# Patient Record
Sex: FEMALE | Race: WHITE | NOT HISPANIC OR LATINO | Employment: OTHER | URBAN - METROPOLITAN AREA
[De-identification: names, ages, dates, MRNs, and addresses within clinical notes are randomized per-mention and may not be internally consistent; named-entity substitution may affect disease eponyms.]

---

## 2018-12-14 ENCOUNTER — OFFICE VISIT (OUTPATIENT)
Dept: FAMILY MEDICINE | Facility: CLINIC | Age: 63
End: 2018-12-14
Payer: MEDICARE

## 2018-12-14 VITALS
OXYGEN SATURATION: 95 % | SYSTOLIC BLOOD PRESSURE: 165 MMHG | HEIGHT: 64 IN | TEMPERATURE: 98 F | WEIGHT: 198.81 LBS | BODY MASS INDEX: 33.94 KG/M2 | RESPIRATION RATE: 20 BRPM | HEART RATE: 68 BPM | DIASTOLIC BLOOD PRESSURE: 72 MMHG

## 2018-12-14 DIAGNOSIS — M51.9 LUMBAR DISC DISORDER: Primary | ICD-10-CM

## 2018-12-14 DIAGNOSIS — I10 ESSENTIAL HYPERTENSION: ICD-10-CM

## 2018-12-14 DIAGNOSIS — E03.9 HYPOTHYROIDISM, UNSPECIFIED TYPE: ICD-10-CM

## 2018-12-14 DIAGNOSIS — R19.7 DIARRHEA, UNSPECIFIED TYPE: ICD-10-CM

## 2018-12-14 DIAGNOSIS — I50.9 CHRONIC CONGESTIVE HEART FAILURE, UNSPECIFIED HEART FAILURE TYPE: ICD-10-CM

## 2018-12-14 DIAGNOSIS — E66.9 OBESITY (BMI 30.0-34.9): ICD-10-CM

## 2018-12-14 DIAGNOSIS — K22.4 ESOPHAGEAL SPASM: ICD-10-CM

## 2018-12-14 PROCEDURE — 3078F DIAST BP <80 MM HG: CPT | Mod: CPTII,S$GLB,, | Performed by: NURSE PRACTITIONER

## 2018-12-14 PROCEDURE — 3077F SYST BP >= 140 MM HG: CPT | Mod: CPTII,S$GLB,, | Performed by: NURSE PRACTITIONER

## 2018-12-14 PROCEDURE — 99999 PR PBB SHADOW E&M-NEW PATIENT-LVL III: CPT | Mod: PBBFAC,,, | Performed by: NURSE PRACTITIONER

## 2018-12-14 PROCEDURE — 3008F BODY MASS INDEX DOCD: CPT | Mod: CPTII,S$GLB,, | Performed by: NURSE PRACTITIONER

## 2018-12-14 PROCEDURE — 99204 OFFICE O/P NEW MOD 45 MIN: CPT | Mod: S$GLB,,, | Performed by: NURSE PRACTITIONER

## 2018-12-14 RX ORDER — HYDROCODONE BITARTRATE AND ACETAMINOPHEN 7.5; 325 MG/1; MG/1
TABLET ORAL
Refills: 0 | COMMUNITY
Start: 2018-10-19 | End: 2019-06-25 | Stop reason: SDUPTHER

## 2018-12-14 RX ORDER — MONTELUKAST SODIUM 4 MG/1
1 TABLET, CHEWABLE ORAL DAILY PRN
COMMUNITY
Start: 2018-12-04 | End: 2020-03-04 | Stop reason: CLARIF

## 2018-12-14 RX ORDER — ALLOPURINOL 300 MG/1
300 TABLET ORAL DAILY
COMMUNITY
Start: 2018-11-16 | End: 2020-01-30 | Stop reason: SDUPTHER

## 2018-12-14 RX ORDER — CYANOCOBALAMIN 1000 UG/ML
INJECTION, SOLUTION INTRAMUSCULAR; SUBCUTANEOUS
Refills: 2 | COMMUNITY
Start: 2018-09-12 | End: 2019-04-23 | Stop reason: SDUPTHER

## 2018-12-14 RX ORDER — GLYCOPYRROLATE 1 MG/1
TABLET ORAL
Qty: 90 TABLET | Refills: 3 | Status: SHIPPED | OUTPATIENT
Start: 2018-12-14 | End: 2019-07-17 | Stop reason: SDUPTHER

## 2018-12-14 RX ORDER — GLYCOPYRROLATE 1 MG/1
TABLET ORAL
Refills: 0 | COMMUNITY
Start: 2018-12-04 | End: 2018-12-14 | Stop reason: SDUPTHER

## 2018-12-14 NOTE — PROGRESS NOTES
"Chief Complaint  Chief Complaint   Patient presents with    [facility]       HPI:  Alessia Nelson is a 63 y.o. female with medical diagnoses as listed and reviewed within the medical history and problem list that presents to [facility]. She has multiple chronic medical problems as listed. She reports that they are all "controlled". Pt has an appointment with neurosurgery in [place] in January and she is wanting to get a new referral to see an [facility] doctor in [place]. Pt reports that she has bulging discs and has had previous hardware placed into her lumbar spine. Pt is ambulatory. She controls her pain with tylenol, motrin, and hydrocodone as needed. She has not had labs done in about 6 months.     PAST MEDICAL HISTORY:  Past Medical History:   Diagnosis Date    CHF (congestive heart failure)     COPD (chronic obstructive pulmonary disease)     Depression     Hypertension     Thyroid disease        PAST SURGICAL HISTORY:  Past Surgical History:   Procedure Laterality Date    instestine      PARATHYROID GLAND SURGERY      3 surgeries       SOCIAL HISTORY:  Social History     Socioeconomic History    Marital status: Single     Spouse name: Not on file    Number of children: Not on file    Years of education: Not on file    Highest education level: Not on file   Social Needs    Financial resource strain: Not on file    Food insecurity - worry: Not on file    Food insecurity - inability: Not on file    Transportation needs - medical: Not on file    Transportation needs - non-medical: Not on file   Occupational History    Not on file   Tobacco Use    Smoking status: Current Every Day Smoker     Packs/day: 1.00     Years: 15.00     Pack years: 15.00   Substance and Sexual Activity    Alcohol use: No    Drug use: No    Sexual activity: Not Currently   Other Topics Concern    Not on file   Social History Narrative    Not on file       FAMILY HISTORY:  Family History   Problem " Relation Age of Onset    Stroke Maternal Grandmother     Cancer Maternal Grandfather        ALLERGIES AND MEDICATIONS: updated and reviewed.  Review of patient's allergies indicates:   Allergen Reactions    Aspirin     Hydrocodone     Phenergan [promethazine]      Current Outpatient Medications   Medication Sig Dispense Refill    acetaminophen (TYLENOL) 325 MG tablet Take 2 tablets (650 mg total) by mouth every 6 (six) hours as needed (or equal to 101 degree F).      allopurinol (ZYLOPRIM) 300 MG tablet       amitriptyline (ELAVIL) 100 MG tablet Take 100 mg by mouth every evening.      amlodipine (NORVASC) 5 MG tablet Take 5 mg by mouth once daily.      clonidine 0.3 mg/24 hr td ptwk (CATAPRES) 0.3 mg/24 hr Place 1 patch onto the skin every 7 days. 4 patch 3    colestipol (COLESTID) 1 gram Tab       cyanocobalamin 1,000 mcg/mL injection INJECT 1 ML INTO THE MUSCLE Q OTHER WEEK.  2    escitalopram (LEXAPRO) 20 MG tablet Take 20 mg by mouth once daily.      gabapentin (NEURONTIN) 800 MG tablet Take 800 mg by mouth 4 (four) times daily.      glycopyrrolate (ROBINUL) 1 mg Tab TK 1 T PO BID 90 tablet 3    HYDROcodone-acetaminophen (NORCO) 7.5-325 mg per tablet TK 1 T PO  BID PRN PAIN  0    levothyroxine (SYNTHROID) 50 MCG tablet Take 50 mcg by mouth once daily.      lisinopril (PRINIVIL,ZESTRIL) 40 MG tablet Take 0.5 tablets (20 mg total) by mouth daily with dinner or evening meal. 30 tablet 3    magnesium oxide (MAG-OX) 400 mg tablet Take 1 tablet (400 mg total) by mouth once daily. 30 tablet 1    metoprolol tartrate (LOPRESSOR) 100 MG tablet Take 100 mg by mouth 2 (two) times daily.      nifedipine 30 MG ORAL TR24 (PROCARDIA-XL) 30 MG (OSM) 24 hr tablet Take 1 tablet (30 mg total) by mouth once daily. 30 tablet 3    ondansetron (ZOFRAN) 4 MG tablet Take 8 mg by mouth every 8 (eight) hours.      pantoprazole (PROTONIX) 40 MG tablet Take 40 mg by mouth once daily.      potassium chloride SA  "(K-DUR,KLOR-CON) 20 MEQ tablet Take 1 tablet (20 mEq total) by mouth once daily. 30 tablet 3     No current facility-administered medications for this visit.          ROS  Review of Systems   Constitutional: Negative for appetite change, chills, fatigue and fever.   HENT: Negative for congestion, postnasal drip, rhinorrhea and sore throat.    Respiratory: Negative for cough, chest tightness, shortness of breath and wheezing.    Cardiovascular: Negative for chest pain and palpitations.   Gastrointestinal: Negative for abdominal distention, abdominal pain, constipation, diarrhea, nausea and vomiting.   Genitourinary: Negative for dysuria.   Musculoskeletal: Positive for arthralgias, back pain and myalgias.   Skin: Negative for color change and rash.   Neurological: Negative for dizziness, weakness and headaches.   Psychiatric/Behavioral: Negative for confusion and sleep disturbance. The patient is not nervous/anxious.            PHYSICAL EXAM  Vitals:    12/14/18 1056   BP: (!) 165/72   BP Location: Left arm   Patient Position: Sitting   Pulse: 68   Resp: 20   Temp: 98 °F (36.7 °C)   SpO2: 95%   Weight: 90.2 kg (198 lb 12.8 oz)   Height: 5' 4" (1.626 m)    Body mass index is 34.12 kg/m².  Weight: 90.2 kg (198 lb 12.8 oz)   Height: 5' 4" (162.6 cm)       Physical Exam   Constitutional: She is oriented to person, place, and time. She appears well-developed and well-nourished.   HENT:   Head: Normocephalic.   Eyes: Pupils are equal, round, and reactive to light.   Neck: Normal range of motion. Neck supple.   Cardiovascular: Normal rate, regular rhythm, S1 normal and S2 normal.   No murmur heard.  Pulmonary/Chest: Effort normal and breath sounds normal. She has no wheezes.   Abdominal: Soft. Normal appearance and bowel sounds are normal. She exhibits no distension. There is no tenderness.   Musculoskeletal: Normal range of motion.   Neurological: She is alert and oriented to person, place, and time.   Skin: Skin is warm " and dry. Capillary refill takes less than 2 seconds.   Psychiatric: She has a normal mood and affect. Her speech is normal and behavior is normal. Thought content normal. Cognition and memory are normal.   Vitals reviewed.        Health Maintenance       Date Due Completion Date    Hepatitis C Screening 1955 ---    Lipid Panel 1955 ---    TETANUS VACCINE 10/05/1974 ---    Pap Smear with HPV Cotest 10/05/1977 ---    Mammogram 10/05/1996 ---    Colonoscopy 10/05/2006 ---    Zoster Vaccine 10/05/2016 ---    Influenza Vaccine 08/01/2018 ---               Assessment & Plan    Alessia was seen today for establish care.    Diagnoses and all orders for this visit:    Lumbar disc disorder  -     Ambulatory referral to Neurosurgery    Essential hypertension  -     CBC auto differential; Future  -     Comprehensive metabolic panel; Future  -     Lipid panel; Future    Diarrhea, unspecified type  -     CBC auto differential; Future  -     Comprehensive metabolic panel; Future    Chronic congestive heart failure, unspecified heart failure type  -     CBC auto differential; Future  -     Comprehensive metabolic panel; Future  -     Lipid panel; Future    Hypothyroidism, unspecified type  -     TSH; Future    Obesity (BMI 30.0-34.9)    Esophageal spasm  -     glycopyrrolate (ROBINUL) 1 mg Tab; TK 1 T PO BID    - Will check labs and update plan of care.   - Educated on back care.   - Educated on diet and exercise to improve overall health and improve control of chronic medical conditions.     Follow-up: Follow-up in about 3 months (around 3/14/2019).      Risks, benefits, and side effects were discussed with the patient. All questions were answered to the fullest satisfaction of the patient, and pt verbalized understanding and agreement to treatment plan. Pt was to call with any new or worsening symptoms, or present to the ER.

## 2019-01-04 ENCOUNTER — OFFICE VISIT (OUTPATIENT)
Dept: FAMILY MEDICINE | Facility: CLINIC | Age: 64
End: 2019-01-04
Payer: MEDICARE

## 2019-01-04 VITALS
BODY MASS INDEX: 31.39 KG/M2 | SYSTOLIC BLOOD PRESSURE: 139 MMHG | HEIGHT: 67 IN | DIASTOLIC BLOOD PRESSURE: 76 MMHG | WEIGHT: 200 LBS | TEMPERATURE: 99 F | HEART RATE: 62 BPM

## 2019-01-04 DIAGNOSIS — J20.9 COPD WITH ACUTE BRONCHITIS: Primary | ICD-10-CM

## 2019-01-04 DIAGNOSIS — F51.01 PRIMARY INSOMNIA: ICD-10-CM

## 2019-01-04 DIAGNOSIS — J44.0 COPD WITH ACUTE BRONCHITIS: Primary | ICD-10-CM

## 2019-01-04 DIAGNOSIS — J01.40 ACUTE NON-RECURRENT PANSINUSITIS: ICD-10-CM

## 2019-01-04 PROCEDURE — 99213 PR OFFICE/OUTPT VISIT, EST, LEVL III, 20-29 MIN: ICD-10-PCS | Mod: S$GLB,,, | Performed by: NURSE PRACTITIONER

## 2019-01-04 PROCEDURE — 3075F PR MOST RECENT SYSTOLIC BLOOD PRESS GE 130-139MM HG: ICD-10-PCS | Mod: CPTII,S$GLB,, | Performed by: NURSE PRACTITIONER

## 2019-01-04 PROCEDURE — 3078F PR MOST RECENT DIASTOLIC BLOOD PRESSURE < 80 MM HG: ICD-10-PCS | Mod: CPTII,S$GLB,, | Performed by: NURSE PRACTITIONER

## 2019-01-04 PROCEDURE — 99999 PR PBB SHADOW E&M-EST. PATIENT-LVL III: ICD-10-PCS | Mod: PBBFAC,,, | Performed by: NURSE PRACTITIONER

## 2019-01-04 PROCEDURE — 3008F BODY MASS INDEX DOCD: CPT | Mod: CPTII,S$GLB,, | Performed by: NURSE PRACTITIONER

## 2019-01-04 PROCEDURE — 3078F DIAST BP <80 MM HG: CPT | Mod: CPTII,S$GLB,, | Performed by: NURSE PRACTITIONER

## 2019-01-04 PROCEDURE — 99213 OFFICE O/P EST LOW 20 MIN: CPT | Mod: S$GLB,,, | Performed by: NURSE PRACTITIONER

## 2019-01-04 PROCEDURE — 3075F SYST BP GE 130 - 139MM HG: CPT | Mod: CPTII,S$GLB,, | Performed by: NURSE PRACTITIONER

## 2019-01-04 PROCEDURE — 99999 PR PBB SHADOW E&M-EST. PATIENT-LVL III: CPT | Mod: PBBFAC,,, | Performed by: NURSE PRACTITIONER

## 2019-01-04 PROCEDURE — 3008F PR BODY MASS INDEX (BMI) DOCUMENTED: ICD-10-PCS | Mod: CPTII,S$GLB,, | Performed by: NURSE PRACTITIONER

## 2019-01-04 RX ORDER — TRAZODONE HYDROCHLORIDE 100 MG/1
100 TABLET ORAL NIGHTLY
Qty: 30 TABLET | Refills: 2 | Status: SHIPPED | OUTPATIENT
Start: 2019-01-04 | End: 2019-04-15 | Stop reason: SDUPTHER

## 2019-01-04 RX ORDER — METHYLPREDNISOLONE 4 MG/1
TABLET ORAL
Qty: 1 PACKAGE | Refills: 0 | Status: SHIPPED | OUTPATIENT
Start: 2019-01-04 | End: 2019-01-25

## 2019-01-04 RX ORDER — LEVOFLOXACIN 500 MG/1
500 TABLET, FILM COATED ORAL DAILY
Qty: 10 TABLET | Refills: 0 | Status: SHIPPED | OUTPATIENT
Start: 2019-01-04 | End: 2019-04-18

## 2019-01-04 RX ORDER — CLONIDINE 0.3 MG/24H
1 PATCH, EXTENDED RELEASE TRANSDERMAL
COMMUNITY
End: 2019-04-18

## 2019-01-04 NOTE — PROGRESS NOTES
Chief Complaint  Chief Complaint   Patient presents with    Nasal Congestion    Cough    Sinus Problem       HPI:  Alessia Nelson is a 63 y.o. female with medical diagnoses as listed and reviewed within the medical history and problem list that presents for cough and congestion for about 2 weeks that is not improving. She reports that she has been wheezing worse at night. No fever since last week. She is feeling fatigued with sore throat. Coughing up thick green sputum. Denies chest pain or SOB. No ear pain.     PAST MEDICAL HISTORY:  Past Medical History:   Diagnosis Date    CHF (congestive heart failure)     COPD (chronic obstructive pulmonary disease)     Depression     Hypertension     Thyroid disease        PAST SURGICAL HISTORY:  Past Surgical History:   Procedure Laterality Date    instestine      PARATHYROID GLAND SURGERY      3 surgeries       SOCIAL HISTORY:  Social History     Socioeconomic History    Marital status: Single     Spouse name: Not on file    Number of children: Not on file    Years of education: Not on file    Highest education level: Not on file   Social Needs    Financial resource strain: Not on file    Food insecurity - worry: Not on file    Food insecurity - inability: Not on file    Transportation needs - medical: Not on file    Transportation needs - non-medical: Not on file   Occupational History    Not on file   Tobacco Use    Smoking status: Current Every Day Smoker     Packs/day: 1.00     Years: 15.00     Pack years: 15.00   Substance and Sexual Activity    Alcohol use: No    Drug use: No    Sexual activity: Not Currently   Other Topics Concern    Not on file   Social History Narrative    Not on file       FAMILY HISTORY:  Family History   Problem Relation Age of Onset    Stroke Maternal Grandmother     Cancer Maternal Grandfather        ALLERGIES AND MEDICATIONS: updated and reviewed.  Review of patient's allergies indicates:   Allergen Reactions     Aspirin     Phenergan [promethazine]     Promethazine hcl      Current Outpatient Medications   Medication Sig Dispense Refill    acetaminophen (TYLENOL) 325 MG tablet Take 2 tablets (650 mg total) by mouth every 6 (six) hours as needed (or equal to 101 degree F).      allopurinol (ZYLOPRIM) 300 MG tablet       amitriptyline (ELAVIL) 100 MG tablet Take 100 mg by mouth every evening.      amlodipine (NORVASC) 5 MG tablet Take 5 mg by mouth once daily.      cloNIDine 0.3 mg/24 hr td ptwk (CATAPRES) 0.3 mg/24 hr Place 1 patch onto the skin.      colestipol (COLESTID) 1 gram Tab       cyanocobalamin 1,000 mcg/mL injection INJECT 1 ML INTO THE MUSCLE Q OTHER WEEK.  2    escitalopram (LEXAPRO) 20 MG tablet Take 20 mg by mouth once daily.      gabapentin (NEURONTIN) 800 MG tablet Take 800 mg by mouth 4 (four) times daily.      glycopyrrolate (ROBINUL) 1 mg Tab TK 1 T PO BID 90 tablet 3    HYDROcodone-acetaminophen (NORCO) 7.5-325 mg per tablet TK 1 T PO  BID PRN PAIN  0    levothyroxine (SYNTHROID) 50 MCG tablet Take 50 mcg by mouth once daily.      magnesium oxide (MAG-OX) 400 mg tablet Take 1 tablet (400 mg total) by mouth once daily. 30 tablet 1    metoprolol tartrate (LOPRESSOR) 100 MG tablet Take 100 mg by mouth 2 (two) times daily.      ondansetron (ZOFRAN) 4 MG tablet Take 8 mg by mouth every 8 (eight) hours.      pantoprazole (PROTONIX) 40 MG tablet Take 40 mg by mouth once daily.      potassium chloride SA (K-DUR,KLOR-CON) 20 MEQ tablet Take 1 tablet (20 mEq total) by mouth once daily. 30 tablet 3    ipratropium-albuterol (COMBIVENT)  mcg/actuation inhaler Inhale 1 puff into the lungs 4 (four) times daily. Rescue 1 Package 0    levoFLOXacin (LEVAQUIN) 500 MG tablet Take 1 tablet (500 mg total) by mouth once daily. 10 tablet 0    lisinopril (PRINIVIL,ZESTRIL) 40 MG tablet Take 0.5 tablets (20 mg total) by mouth daily with dinner or evening meal. 30 tablet 3    methylPREDNISolone (MEDROL  "DOSEPACK) 4 mg tablet use as directed 1 Package 0    nifedipine 30 MG ORAL TR24 (PROCARDIA-XL) 30 MG (OSM) 24 hr tablet Take 1 tablet (30 mg total) by mouth once daily. 30 tablet 3    traZODone (DESYREL) 100 MG tablet Take 1 tablet (100 mg total) by mouth every evening. 30 tablet 2     No current facility-administered medications for this visit.          ROS  Review of Systems   Constitutional: Positive for fatigue. Negative for appetite change, chills and fever.   HENT: Positive for congestion, postnasal drip, rhinorrhea, sinus pressure and sore throat.    Respiratory: Positive for cough and wheezing. Negative for chest tightness and shortness of breath.    Cardiovascular: Negative for chest pain and palpitations.   Gastrointestinal: Negative for abdominal distention, abdominal pain, constipation, diarrhea, nausea and vomiting.   Genitourinary: Negative for dysuria.   Musculoskeletal: Positive for myalgias.   Skin: Negative for color change and rash.   Neurological: Positive for headaches. Negative for dizziness and weakness.   Psychiatric/Behavioral: Positive for sleep disturbance. Negative for confusion. The patient is not nervous/anxious.            PHYSICAL EXAM  Vitals:    01/04/19 1045   BP: 139/76   Pulse: 62   Temp: 98.8 °F (37.1 °C)   Weight: 90.7 kg (200 lb)   Height: 5' 7" (1.702 m)    Body mass index is 31.32 kg/m².  Weight: 90.7 kg (200 lb)   Height: 5' 7" (170.2 cm)       Physical Exam   Constitutional: She is oriented to person, place, and time. She appears well-developed and well-nourished.   HENT:   Head: Normocephalic.   Right Ear: Tympanic membrane is bulging.   Left Ear: Tympanic membrane is bulging.   Nose: Mucosal edema and rhinorrhea present. Right sinus exhibits maxillary sinus tenderness and frontal sinus tenderness. Left sinus exhibits maxillary sinus tenderness and frontal sinus tenderness.   Mouth/Throat: Mucous membranes are normal. Posterior oropharyngeal erythema present.   Eyes: " Pupils are equal, round, and reactive to light.   Neck: Normal range of motion. Neck supple.   Cardiovascular: Normal rate, regular rhythm, S1 normal and S2 normal.   No murmur heard.  Pulmonary/Chest: Effort normal. She has wheezes.   Abdominal: Soft. Normal appearance and bowel sounds are normal. She exhibits no distension. There is no tenderness.   Musculoskeletal: Normal range of motion.   Neurological: She is alert and oriented to person, place, and time.   Skin: Skin is warm and dry. Capillary refill takes less than 2 seconds.   Psychiatric: She has a normal mood and affect. Her speech is normal and behavior is normal. Thought content normal. Cognition and memory are normal.   Vitals reviewed.        Health Maintenance       Date Due Completion Date    Hepatitis C Screening 1955 ---    Lipid Panel 1955 ---    TETANUS VACCINE 10/05/1974 ---    Pap Smear with HPV Cotest 10/05/1977 ---    Mammogram 10/05/1996 ---    Colonoscopy 10/05/2006 ---    Zoster Vaccine 10/05/2016 ---    Influenza Vaccine 08/01/2018 10/23/2013               Assessment & Plan    Alessia was seen today for nasal congestion, cough and sinus problem.    Diagnoses and all orders for this visit:    COPD with acute bronchitis  -     levoFLOXacin (LEVAQUIN) 500 MG tablet; Take 1 tablet (500 mg total) by mouth once daily.  -     methylPREDNISolone (MEDROL DOSEPACK) 4 mg tablet; use as directed  -     ipratropium-albuterol (COMBIVENT)  mcg/actuation inhaler; Inhale 1 puff into the lungs 4 (four) times daily. Rescue    Acute non-recurrent pansinusitis  -     levoFLOXacin (LEVAQUIN) 500 MG tablet; Take 1 tablet (500 mg total) by mouth once daily.  -     methylPREDNISolone (MEDROL DOSEPACK) 4 mg tablet; use as directed  -     ipratropium-albuterol (COMBIVENT)  mcg/actuation inhaler; Inhale 1 puff into the lungs 4 (four) times daily. Rescue    Primary insomnia  -     traZODone (DESYREL) 100 MG tablet; Take 1 tablet (100 mg total)  by mouth every evening.        Follow-up: Follow-up if symptoms worsen or fail to improve.      Risks, benefits, and side effects were discussed with the patient. All questions were answered to the fullest satisfaction of the patient, and pt verbalized understanding and agreement to treatment plan. Pt was to call with any new or worsening symptoms, or present to the ER.

## 2019-01-11 RX ORDER — METOPROLOL TARTRATE 100 MG/1
100 TABLET ORAL 2 TIMES DAILY
Qty: 14 TABLET | Refills: 0 | Status: SHIPPED | OUTPATIENT
Start: 2019-01-11 | End: 2019-01-14 | Stop reason: SDUPTHER

## 2019-01-11 RX ORDER — CLOPIDOGREL BISULFATE 75 MG/1
75 TABLET ORAL DAILY
Qty: 14 TABLET | Refills: 0 | Status: SHIPPED | OUTPATIENT
Start: 2019-01-11 | End: 2019-04-29 | Stop reason: SDUPTHER

## 2019-01-11 NOTE — TELEPHONE ENCOUNTER
----- Message from Elizabeth Davis sent at 1/11/2019 12:31 PM CST -----  Contact: Patient  Type:  RX Refill Request    Who Called:  Patient  Refill or New Rx:  refil  RX Name and Strength:  metoprolol tartrate (LOPRESSOR) 100 MG tablet  How is the patient currently taking it? (ex. 1XDay):  2x day  Is this a 30 day or 90 day RX:  90 day  Preferred Pharmacy with phone number:    [facility] - [place], [place] - [address]  [address] [address]  Phone: [phone] Fax: [phone]    Local or Mail Order:  mail  Ordering Provider:  nay  Best Call Back Number:  [phone] ([place])    Additional Information:  nay

## 2019-01-11 NOTE — TELEPHONE ENCOUNTER
----- Message from Elizabeth Davis sent at 1/11/2019 12:29 PM CST -----  Contact: Patient  Type: Needs Medical Advice    Who Called:  Patient  Symptoms (please be specific):  na  How long has patient had these symptoms:  nay  Pharmacy name and phone #:    [facility] - [place], [place] - [address]  [address] [address]    Best Call Back Number: [phone] (home)    Additional Information: Patient states that she needs Clopidogrel 75 mg called in to the [facility]. Was not on medication listPhone: [phone] Fax: [phone]

## 2019-01-11 NOTE — TELEPHONE ENCOUNTER
I have spoken with patient and explained that we have no record of patient being prescribed clopidogrel from this office.  She usually receives this from her cardiologist, however, she would like to get her medications from this office.  She would like a 2 week supply locally, to [facility] in [place], and an Rx to go to [facility] mail order.  Please advise and thank you, Michelle

## 2019-01-14 RX ORDER — METOPROLOL TARTRATE 100 MG/1
100 TABLET ORAL 2 TIMES DAILY
Qty: 90 TABLET | Refills: 3 | Status: SHIPPED | OUTPATIENT
Start: 2019-01-14 | End: 2019-12-12 | Stop reason: SDUPTHER

## 2019-01-14 RX ORDER — CLOPIDOGREL BISULFATE 75 MG/1
75 TABLET ORAL DAILY
Qty: 90 TABLET | Refills: 3 | Status: ON HOLD | OUTPATIENT
Start: 2019-01-14 | End: 2019-04-19 | Stop reason: HOSPADM

## 2019-01-28 ENCOUNTER — HOSPITAL ENCOUNTER (EMERGENCY)
Facility: HOSPITAL | Age: 64
Discharge: HOME | End: 2019-01-28
Attending: EMERGENCY MEDICINE
Payer: MEDICARE

## 2019-01-28 ENCOUNTER — TELEPHONE (OUTPATIENT)
Dept: FAMILY MEDICINE | Facility: CLINIC | Age: 64
End: 2019-01-28

## 2019-01-28 VITALS
TEMPERATURE: 99 F | HEART RATE: 71 BPM | RESPIRATION RATE: 18 BRPM | BODY MASS INDEX: 29.03 KG/M2 | DIASTOLIC BLOOD PRESSURE: 81 MMHG | HEIGHT: 67 IN | WEIGHT: 185 LBS | SYSTOLIC BLOOD PRESSURE: 144 MMHG | OXYGEN SATURATION: 98 %

## 2019-01-28 DIAGNOSIS — S16.1XXA ACUTE STRAIN OF NECK MUSCLE, INITIAL ENCOUNTER: Primary | ICD-10-CM
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
## 2019-01-28 DIAGNOSIS — M53.3 SACRAL PAIN: ICD-10-CM

## 2019-01-28 PROCEDURE — 73630 XR FOOT COMPLETE 3 VIEW RIGHT: ICD-10-PCS | Mod: 26,RT,, | Performed by: RADIOLOGY

## 2019-01-28 PROCEDURE — 63600175 PHARM REV CODE 636 W HCPCS: Performed by: EMERGENCY MEDICINE

## 2019-01-28 PROCEDURE — 70450 CT HEAD WITHOUT CONTRAST: ICD-10-PCS | Mod: 26,,, | Performed by: RADIOLOGY

## 2019-01-28 PROCEDURE — 93010 EKG 12-LEAD: ICD-10-PCS | Mod: ,,, | Performed by: INTERNAL MEDICINE

## 2019-01-28 PROCEDURE — 72125 CT NECK SPINE W/O DYE: CPT | Mod: TC

## 2019-01-28 PROCEDURE — 70450 CT HEAD/BRAIN W/O DYE: CPT | Mod: 26,,, | Performed by: RADIOLOGY

## 2019-01-28 PROCEDURE — 96372 THER/PROPH/DIAG INJ SC/IM: CPT

## 2019-01-28 PROCEDURE — 72220 X-RAY EXAM SACRUM TAILBONE: CPT | Mod: 26,,, | Performed by: RADIOLOGY

## 2019-01-28 PROCEDURE — 93005 ELECTROCARDIOGRAM TRACING: CPT

## 2019-01-28 PROCEDURE — 99284 EMERGENCY DEPT VISIT MOD MDM: CPT | Mod: 25

## 2019-01-28 PROCEDURE — 72125 CT CERVICAL SPINE WITHOUT CONTRAST: ICD-10-PCS | Mod: 26,,, | Performed by: RADIOLOGY

## 2019-01-28 PROCEDURE — 93010 ELECTROCARDIOGRAM REPORT: CPT | Mod: ,,, | Performed by: INTERNAL MEDICINE

## 2019-01-28 PROCEDURE — 72125 CT NECK SPINE W/O DYE: CPT | Mod: 26,,, | Performed by: RADIOLOGY

## 2019-01-28 PROCEDURE — 72220 X-RAY EXAM SACRUM TAILBONE: CPT | Mod: TC,FY

## 2019-01-28 PROCEDURE — 73630 X-RAY EXAM OF FOOT: CPT | Mod: TC,FY,RT

## 2019-01-28 PROCEDURE — 72220 XR SACRUM AND COCCYX: ICD-10-PCS | Mod: 26,,, | Performed by: RADIOLOGY

## 2019-01-28 PROCEDURE — 70450 CT HEAD/BRAIN W/O DYE: CPT | Mod: TC

## 2019-01-28 PROCEDURE — 73630 X-RAY EXAM OF FOOT: CPT | Mod: 26,RT,, | Performed by: RADIOLOGY

## 2019-01-28 RX ORDER — KETOROLAC TROMETHAMINE 30 MG/ML
60 INJECTION, SOLUTION INTRAMUSCULAR; INTRAVENOUS
Status: COMPLETED | OUTPATIENT
Start: 2019-01-28 | End: 2019-01-28

## 2019-01-28 RX ADMIN — KETOROLAC TROMETHAMINE 60 MG: 30 INJECTION, SOLUTION INTRAMUSCULAR at 10:01

## 2019-01-28 NOTE — TELEPHONE ENCOUNTER
Pt states she fell yesterday, and is having severe pain in her buttocks and neck. Pt states she has a raised area with discoloration on her hear, and it is painful with movement and to touch.   Informed pt that she should go to the ER for eval, due to fall and a head injury.   Pt voiced understanding.       ----- Message from RT Zara sent at 1/28/2019 10:52 AM CST -----  Contact: Pt    Pt , requesting an appt to be worked in as soon as possible she fell early Saturday 01/25/2019 morning, experiencing neck, back and leg pain issues, thanks.

## 2019-01-30 NOTE — ED PROVIDER NOTES
Encounter Date: 1/28/2019       History     Chief Complaint   Patient presents with    Fall     64yo female presents to ED for evaluation of buttock, low back, and posterior neck pain after a trip and fall at home on Saturday. Denies LOC.            Review of patient's allergies indicates:   Allergen Reactions    Aspirin     Phenergan [promethazine]     Promethazine hcl      Past Medical History:   Diagnosis Date    CHF (congestive heart failure)     COPD (chronic obstructive pulmonary disease)     Depression     Hypertension     Thyroid disease      Past Surgical History:   Procedure Laterality Date    instestine      PARATHYROID GLAND SURGERY      3 surgeries     Family History   Problem Relation Age of Onset    Stroke Maternal Grandmother     Cancer Maternal Grandfather      Social History     Tobacco Use    Smoking status: Current Every Day Smoker     Packs/day: 1.00     Years: 15.00     Pack years: 15.00   Substance Use Topics    Alcohol use: No    Drug use: No     Review of Systems   Constitutional: Negative for chills, diaphoresis, fatigue and fever.   HENT: Negative for congestion, ear pain, rhinorrhea, sinus pressure, sinus pain, sneezing and sore throat.    Eyes: Negative for photophobia, pain, redness and visual disturbance.   Respiratory: Negative for cough, choking, chest tightness, shortness of breath, wheezing and stridor.    Cardiovascular: Negative for chest pain, palpitations and leg swelling.   Gastrointestinal: Negative for abdominal pain, constipation, diarrhea, nausea and vomiting.   Genitourinary: Negative for decreased urine volume, difficulty urinating, dysuria, flank pain, frequency, pelvic pain and urgency.   Musculoskeletal: Positive for back pain and neck pain. Negative for arthralgias, gait problem, joint swelling, myalgias and neck stiffness.        Buttock pain   Skin: Negative for color change, pallor, rash and wound.   Neurological: Positive for headaches. Negative  for dizziness, weakness, light-headedness and numbness.   Hematological: Negative for adenopathy. Does not bruise/bleed easily.   All other systems reviewed and are negative.      Physical Exam     Initial Vitals [01/28/19 2205]   BP Pulse Resp Temp SpO2   (!) 144/81 71 18 98.6 °F (37 °C) 98 %      MAP       --         Physical Exam    Nursing note and vitals reviewed.  Constitutional: She appears well-developed and well-nourished. She is not diaphoretic. No distress.   HENT:   Head: Normocephalic and atraumatic.   Right Ear: External ear normal.   Left Ear: External ear normal.   Nose: Nose normal.   Eyes: Conjunctivae are normal. Pupils are equal, round, and reactive to light. No scleral icterus.   Neck: Neck supple.   ttp over bilateral SCM   Cardiovascular: Normal rate, regular rhythm, normal heart sounds and intact distal pulses.   Pulmonary/Chest: Breath sounds normal. No respiratory distress. She has no wheezes. She has no rhonchi. She exhibits no tenderness.   Abdominal: Soft. Bowel sounds are normal. She exhibits no distension. There is no tenderness. There is no rebound and no guarding.   Musculoskeletal: Normal range of motion. She exhibits tenderness. She exhibits no edema.   ttp over bilateral SI, ttp over coccyx, ttp over anterior aspect of right foot   Lymphadenopathy:     She has no cervical adenopathy.   Neurological: She is alert and oriented to person, place, and time. She has normal strength and normal reflexes. She displays normal reflexes. No cranial nerve deficit or sensory deficit. GCS score is 15. GCS eye subscore is 4. GCS verbal subscore is 5. GCS motor subscore is 6.   Skin: Skin is warm. Capillary refill takes less than 2 seconds. No rash noted. No erythema.   Psychiatric: She has a normal mood and affect. Her behavior is normal. Judgment and thought content normal.         ED Course   Procedures  Labs Reviewed - No data to display  EKG Readings: (Independently Interpreted)   Initial  Reading: No STEMI. Rhythm: Normal Sinus Rhythm. Heart Rate: 68. Ectopy: No Ectopy. Conduction: Normal. ST Segments: Normal ST Segments. T Waves: Normal. Axis: Normal. Clinical Impression: Normal Sinus Rhythm     ECG Results          EKG 12-lead (Final result)  Result time 01/29/19 12:20:51    Final result by Interface, Lab In [facility] (01/29/19 12:20:51)                 Narrative:    Test Reason : SOB    Vent. Rate : 068 BPM     Atrial Rate : 068 BPM     P-R Int : 166 ms          QRS Dur : 096 ms      QT Int : 424 ms       P-R-T Axes : 058 003 063 degrees     QTc Int : 450 ms    Normal sinus rhythm  Normal ECG  When compared with ECG of 18-DEC-2013 15:20,  Vent. rate has decreased BY  53 BPM  Criteria for Anterior infarct are no longer Present  Criteria for Anterolateral infarct are no longer Present  T wave inversion no longer evident in Inferior leads  T wave inversion no longer evident in Anterior-lateral leads  Confirmed by Jovanni Serrano MD ([id number]) on 1/29/2019 12:20:44 PM    Referred By: [email or web address]   SELF           Confirmed By:Jovanni Serrano MD                            Imaging Results          X-Ray Foot Complete Right (Final result)  Result time 01/29/19 06:36:10    Final result by Jesús Dawson MD (01/29/19 06:36:10)                 Impression:      1. Prior ORIF of the 5th metatarsal.  2. Mild changes of degenerative osteoarthrosis.  3. Mild bone demineralization.      Electronically signed by: Jesús Dawson  Date:    01/29/2019  Time:    06:36             Narrative:    EXAMINATION:  XR FOOT COMPLETE 3 VIEW RIGHT    CLINICAL HISTORY:  . Unspecified fall, initial encounter    TECHNIQUE:  AP, lateral, and oblique views of the right foot were performed.    COMPARISON:  None    FINDINGS:  No acute fracture or dislocation.  No significant soft tissue swelling.    Prior ORIF of the distal head of the 5th metatarsal.  Mild degenerative osteoarthrosis involving the 5th MTP joint in the IP joints of the digits.  The  remaining joint spaces are preserved.  The tarsal bones are normal in appearance.  Normal tarsometatarsal alignment.    Small plantar and dorsal calcaneal spurs.  Mild bone demineralization.                               X-Ray Sacrum And Coccyx (Final result)  Result time 01/29/19 06:42:17    Final result by Jesús Dawson MD (01/29/19 06:42:17)                 Impression:      1. No acute radiographic findings of the sacrum and coccyx.  2. Bone demineralization.      Electronically signed by: Jesús Dawson  Date:    01/29/2019  Time:    06:42             Narrative:    EXAMINATION:  XR SACRUM AND COCCYX    CLINICAL HISTORY:  Unspecified fall, initial encounter    TECHNIQUE:  Two or three views of the sacrum and coccyx were performed.    COMPARISON:  None    FINDINGS:  No acute fracture or dislocation.  The sacrum and coccyx are intact.  SI joints are intact.    Pubic symphysis and bilateral pubic rami are intact.  Calcified phleboliths within the pelvis.  Prior posterior fusion of the lower lumbar spine.    Mild bone demineralization.                               CT Cervical Spine Without Contrast (Final result)  Result time 01/29/19 07:22:50    Final result by Jesús Dawson MD (01/29/19 07:22:50)                 Impression:      1. No acute fracture or subluxation.  2. Prior ACDF from C5-C7.  3. Mild multilevel degenerative disc disease.  The preliminary and final reports are concordant.      Electronically signed by: Jesús Dawson  Date:    01/29/2019  Time:    07:22             Narrative:    EXAMINATION:  CT CERVICAL SPINE WITHOUT CONTRAST    CLINICAL HISTORY:  C-spine trauma, NEXUS/CCR negative, low risk;    TECHNIQUE:  Low dose axial images, sagittal and coronal reformations were performed though the cervical spine.  Contrast was not administered.    COMPARISON:  None    FINDINGS:  Prior ACDF from C5-C7.  There is normal anatomic alignment.  No CT evidence to suggest hardware failure.  The remaining  cervical vertebral bodies are normal in height and alignment.  No acute fracture.    No significant prevertebral soft tissue swelling.    There is mild multilevel degenerative disc disease which results in bilateral neural foraminal narrowing.  Spinal canal is patent.    Lung apices are clear.                               CT Head Without Contrast (Final result)  Result time 01/29/19 06:44:09    Final result by Jesús Dawson MD (01/29/19 06:44:09)                 Impression:      1. Mild cortical atrophy with periventricular deep white matter change consistent with early small vessel ischemic disease.  The preliminary and final reports are concordant.      Electronically signed by: Jesús Dawson  Date:    01/29/2019  Time:    06:44             Narrative:    EXAMINATION:  CT HEAD WITHOUT CONTRAST    CLINICAL HISTORY:  Head trauma, headache;    TECHNIQUE:  Low dose axial images were obtained through the head.  Coronal and sagittal reformations were also performed. Contrast was not administered.    COMPARISON:  Head CT 04/12/2016.    FINDINGS:  There is no acute hemorrhage or infarction.  There is mild cortical atrophy.  There are periventricular deep white matter changes consistent with early small vessel ischemic disease.    No extra-axial fluid collections.  Ventricles are normal in size, shape and configuration.  The basal cisterns are patent.    The imaged paranasal sinuses and ethmoid air cells are well aerated.    The mastoid air cells and middle ears are normally pneumatized.                              X-Rays:   Independently Interpreted Readings:   Head CT: No hemorrhage.  No skull fracture.  No acute stroke.     Medical Decision Making:   Differential Diagnosis:   Sprain, strain, contusion, fracture                      Clinical Impression:   The primary encounter diagnosis was Acute strain of neck muscle, initial encounter. Diagnoses of Fall and Sacral pain were also pertinent to this  visit.      Disposition:   Disposition: Discharged  Condition: Stable                        Leena Penny MD  01/30/19 0405

## 2019-02-13 ENCOUNTER — TELEPHONE (OUTPATIENT)
Dept: NEUROSURGERY | Facility: CLINIC | Age: 64
End: 2019-02-13

## 2019-02-13 NOTE — TELEPHONE ENCOUNTER
I have attempted to contact the pt on 3 separate times. I have left messages inquiring if the pt has recent imaging and to bring to the appt. I will try again in the morning

## 2019-02-14 ENCOUNTER — OFFICE VISIT (OUTPATIENT)
Dept: NEUROSURGERY | Facility: CLINIC | Age: 64
End: 2019-02-14
Payer: MEDICARE

## 2019-02-14 VITALS — WEIGHT: 195.13 LBS | BODY MASS INDEX: 30.63 KG/M2 | TEMPERATURE: 97 F | HEIGHT: 67 IN

## 2019-02-14 DIAGNOSIS — G89.29 CHRONIC RIGHT SI JOINT PAIN: ICD-10-CM

## 2019-02-14 DIAGNOSIS — M96.1 FAILED BACK SYNDROME: ICD-10-CM

## 2019-02-14 DIAGNOSIS — M48.061 LUMBAR FORAMINAL STENOSIS: Primary | ICD-10-CM

## 2019-02-14 DIAGNOSIS — F17.200 SMOKING: ICD-10-CM

## 2019-02-14 DIAGNOSIS — E66.09 CLASS 1 OBESITY DUE TO EXCESS CALORIES WITH SERIOUS COMORBIDITY AND BODY MASS INDEX (BMI) OF 30.0 TO 30.9 IN ADULT: ICD-10-CM

## 2019-02-14 DIAGNOSIS — M53.3 CHRONIC RIGHT SI JOINT PAIN: ICD-10-CM

## 2019-02-14 PROCEDURE — 99203 PR OFFICE/OUTPT VISIT, NEW, LEVL III, 30-44 MIN: ICD-10-PCS | Mod: S$GLB,,, | Performed by: NEUROLOGICAL SURGERY

## 2019-02-14 PROCEDURE — 3008F PR BODY MASS INDEX (BMI) DOCUMENTED: ICD-10-PCS | Mod: CPTII,S$GLB,, | Performed by: NEUROLOGICAL SURGERY

## 2019-02-14 PROCEDURE — 3008F BODY MASS INDEX DOCD: CPT | Mod: CPTII,S$GLB,, | Performed by: NEUROLOGICAL SURGERY

## 2019-02-14 PROCEDURE — 99203 OFFICE O/P NEW LOW 30 MIN: CPT | Mod: S$GLB,,, | Performed by: NEUROLOGICAL SURGERY

## 2019-02-14 PROCEDURE — 99999 PR PBB SHADOW E&M-EST. PATIENT-LVL III: CPT | Mod: PBBFAC,,, | Performed by: NEUROLOGICAL SURGERY

## 2019-02-14 PROCEDURE — 99999 PR PBB SHADOW E&M-EST. PATIENT-LVL III: ICD-10-PCS | Mod: PBBFAC,,, | Performed by: NEUROLOGICAL SURGERY

## 2019-02-14 RX ORDER — CELECOXIB 200 MG/1
200 CAPSULE ORAL 2 TIMES DAILY
Qty: 28 CAPSULE | Refills: 1 | Status: SHIPPED | OUTPATIENT
Start: 2019-02-14 | End: 2019-02-28

## 2019-02-14 RX ORDER — CELECOXIB 200 MG/1
200 CAPSULE ORAL 2 TIMES DAILY
Qty: 28 CAPSULE | Refills: 1 | Status: SHIPPED | OUTPATIENT
Start: 2019-02-14 | End: 2019-02-14

## 2019-02-14 NOTE — PROGRESS NOTES
History & Physical    I, Dexter Lorenz, attest that this documentation has been prepared under the direction and in the presence of Brent Lewis MD.    02/15/2019    Chief Complaint   Patient presents with    Lumbar Spine Pain (L-Spine)    Consult       History of Present Illness:  Alessia Nelson is a 63 y.o. patient who was referred to me by Lynne Parsons NP for evaluation of low back pain.     Patient has a history of smoking (1 ppd), 3 previous low back surgeries with most recent in 2012 which was a lumbar fusion, and 1 previous cervical spine surgery. She had been doing well from her fusion until 2014, when she tripped over a bag of dog food and fell, resulting in a left rotator cuff injury. Since then patient has had gradual resumption of low back pain.     Patient complains of burning, aching low back pain and aching right leg pain. The right leg pain localizes to the posterior and anterior thigh. Her back pain is 5/10 in intensity and her leg pain is 5/10 in intensity. Symptoms have been present for more than 6 months. Exacerbating factors include bending forward, walking, prolonged standing. Alleviating factors include sitting, laying down, massages, leaning forward over the shopping cart. Past treatments include physical therapy for her low back within the last 6 months, epidural steroid injections, Medrol Dosepak, 3 previous back surgeries. Patient currently takes hydrocodone for pain. Associated signs and symptoms are negative for dropping objects from his hands, negative for sloppier handwriting, negative for bowel/bladder dysfunction. Patient states that she is able to walk 2 blocks easily without needing to stop and sit down. Patient does report chronic numbness of bilateral feet attributed to diabetic neuropathy.       History of cardiac stent placement.   Smokes 1 ppd. Goes on cruises     Review of patient's allergies indicates:   Allergen Reactions    Aspirin     Phenergan  [promethazine]     Promethazine hcl        Current Outpatient Medications   Medication Sig Dispense Refill    acetaminophen (TYLENOL) 325 MG tablet Take 2 tablets (650 mg total) by mouth every 6 (six) hours as needed (or equal to 101 degree F).      amitriptyline (ELAVIL) 100 MG tablet Take 100 mg by mouth every evening.      amlodipine (NORVASC) 5 MG tablet Take 5 mg by mouth once daily.      clopidogrel (PLAVIX) 75 mg tablet Take 1 tablet (75 mg total) by mouth once daily. 14 tablet 0    clopidogrel (PLAVIX) 75 mg tablet Take 1 tablet (75 mg total) by mouth once daily. 90 tablet 3    escitalopram (LEXAPRO) 20 MG tablet Take 20 mg by mouth once daily.      gabapentin (NEURONTIN) 800 MG tablet Take 800 mg by mouth 4 (four) times daily.      glycopyrrolate (ROBINUL) 1 mg Tab TK 1 T PO BID 90 tablet 3    HYDROcodone-acetaminophen (NORCO) 7.5-325 mg per tablet TK 1 T PO  BID PRN PAIN  0    levothyroxine (SYNTHROID) 50 MCG tablet Take 50 mcg by mouth once daily.      metoprolol tartrate (LOPRESSOR) 100 MG tablet Take 1 tablet (100 mg total) by mouth 2 (two) times daily. 90 tablet 3    ondansetron (ZOFRAN) 4 MG tablet Take 8 mg by mouth every 8 (eight) hours.      pantoprazole (PROTONIX) 40 MG tablet Take 40 mg by mouth once daily.      potassium chloride SA (K-DUR,KLOR-CON) 20 MEQ tablet Take 1 tablet (20 mEq total) by mouth once daily. 30 tablet 3    traZODone (DESYREL) 100 MG tablet Take 1 tablet (100 mg total) by mouth every evening. 30 tablet 2    allopurinol (ZYLOPRIM) 300 MG tablet       celecoxib (CELEBREX) 200 MG capsule Take 1 capsule (200 mg total) by mouth 2 (two) times daily. for 14 days 28 capsule 1    cloNIDine 0.3 mg/24 hr td ptwk (CATAPRES) 0.3 mg/24 hr Place 1 patch onto the skin.      colestipol (COLESTID) 1 gram Tab       cyanocobalamin 1,000 mcg/mL injection INJECT 1 ML INTO THE MUSCLE Q OTHER WEEK.  2    ipratropium-albuterol (COMBIVENT)  mcg/actuation inhaler Inhale 1  puff into the lungs 4 (four) times daily. Rescue 1 Package 0    levoFLOXacin (LEVAQUIN) 500 MG tablet Take 1 tablet (500 mg total) by mouth once daily. 10 tablet 0    lisinopril (PRINIVIL,ZESTRIL) 40 MG tablet Take 0.5 tablets (20 mg total) by mouth daily with dinner or evening meal. 30 tablet 3    magnesium oxide (MAG-OX) 400 mg tablet Take 1 tablet (400 mg total) by mouth once daily. 30 tablet 1    nifedipine 30 MG ORAL TR24 (PROCARDIA-XL) 30 MG (OSM) 24 hr tablet Take 1 tablet (30 mg total) by mouth once daily. 30 tablet 3     No current facility-administered medications for this visit.        Past Medical History:   Diagnosis Date    CHF (congestive heart failure)     COPD (chronic obstructive pulmonary disease)     Depression     Hypertension     Thyroid disease        Past Surgical History:   Procedure Laterality Date    instestine      PARATHYROID GLAND SURGERY      3 surgeries       Family History   Problem Relation Age of Onset    Stroke Maternal Grandmother     Cancer Maternal Grandfather     Stroke Mother     Heart disease Father        Social History     Tobacco Use    Smoking status: Current Every Day Smoker     Packs/day: 1.00     Years: 15.00     Pack years: 15.00   Substance Use Topics    Alcohol use: No    Drug use: No        Review of Systems:  Review of Systems   Constitutional: Negative for activity change.   HENT: Negative for congestion.    Eyes: Negative for discharge.   Respiratory: Negative for apnea.    Cardiovascular: Negative for chest pain.   Gastrointestinal: Negative for abdominal distention.   Endocrine: Negative for cold intolerance.   Genitourinary: Negative for difficulty urinating.   Musculoskeletal: Positive for arthralgias, back pain and myalgias.        Positive for right thigh pain.    Neurological: Negative for dizziness, weakness and headaches.   Psychiatric/Behavioral: Negative for agitation.       Vital Signs (Most Recent)  Temp: 97.1 °F (36.2 °C)  "(02/14/19 0900)  5' 7" (1.702 m)  88.5 kg (195 lb 1.7 oz)       Physical Exam:  Physical Exam:    Constitutional: She appears well-developed.     Eyes: Pupils are equal, round, and reactive to light. EOM are normal. Right eye exhibits no discharge. Left eye exhibits no discharge.     Abdominal: Soft.     Skin: Skin displays no rash on trunk and no rash on extremities.     Psych/Behavior: She is alert. She is oriented to person, place, and time.     Musculoskeletal:        Neck: There is no tenderness.        Back: Range of motion is full.        Right Upper Extremities: Range of motion is full. Muscle strength is 5/5. Tone is normal.        Left Upper Extremities: Range of motion is full. Muscle strength is 5/5. Tone is normal.       Right Lower Extremities: Range of motion is full. Muscle strength is 5/5. Tone is normal.        Left Lower Extremities: Range of motion is full. Muscle strength is 5/5. Tone is normal.     Neurological:        Coordination: She has a normal Romberg Test and normal tandem walking coordination.        Sensory: There is no sensory deficit in the trunk. There is no sensory deficit in the extremities.        DTRs: DTRs are DTRS NORMAL AND SYMMETRICnormal and symmetric. Tricep reflexes are 2+ on the right side and 2+ on the left side. Bicep reflexes are 2+ on the right side and 2+ on the left side. Brachioradialis reflexes are 2+ on the right side and 2+ on the left side. Patellar reflexes are 2+ on the right side and 2+ on the left side. Achilles reflexes are 2+ on the right side and 2+ on the left side. She displays no Babinski's sign on the right side. She displays no Babinski's sign on the left side.        Cranial nerves: Cranial nerve(s) II, III, IV, V, VI, VII, VIII, IX, X, XI and XII are intact.       Negative Romberg.  5/5 strength in all muscle groups in bilateral upper extremities.   Sensation intact to light touch throughout bilateral upper extremities.   Negative Loredo's. "     5/5 strength in muscle groups in bilateral lower extremities.   Sensation intact to light touch throughout bilateral lower extremities.   Negative straight leg raise bilaterally  Positive Daryl's test on the right leg.  Negative Daryl's test on the left leg.         Laboratory  CBC: Reviewed  CMP: Reviewed    Diagnostic Results:  MRI: Reviewed  I have personally reviewed the images and discussed them with the patient:     MRI of the Lumbar Spine,   shows left worse than right L4-5 foraminal stenosis           ASSESSMENT/PLAN:       ICD-10-CM ICD-9-CM   1. Lumbar foraminal stenosis M99.83 724.02   2. Chronic right SI joint pain M53.3 724.6    G89.29 338.29   3. Failed back syndrome M96.1 722.80   4. Smoking F17.200 305.1   5. Class 1 obesity due to excess calories with serious comorbidity and body mass index (BMI) of 30.0 to 30.9 in adult E66.09 278.00    Z68.30 V85.30       PLAN:    1. Failed back syndrome , with multiple L spine surgeries and with chronic diabetic neuropathy and low back pain with signs and symptoms of right SI joint dysfunction. Patient's MRI shows left worse than right L4-5 foraminal stenosis. At this point, I will maximize conservative therapy.   I actually think that the patient is mostly hurting from SI joint dysfunction. I will refer her for right SI joint injections. I will also prescribe her 2 weeks of Celebrex.     2. Considering her chronic neuropathy, I will get EMG Nerve Conduction Study of bilateral lower extremities  To completely rule out radiculopathy.     3. I have advised this patient to continue her amitriptyline.     4. RTC 8 weeks with MRI of the lumbar spine or prior if she has not had any improvement from her conservative therapy.     5. I have also encouraged her to continue her physical therapy.     6. I have advised this patient against smoking.     7. I spent 45 minutes with the patient, 50% of time in counselingabout the above mentioned issues.        Alessia was  seen today for lumbar spine pain (l-spine) and consult.    Diagnoses and all orders for this visit:    Lumbar foraminal stenosis  -     EMG W/ ULTRASOUND AND NERVE CONDUCTION TEST 2 Extremities; Future  -     Ambulatory Referral to Physical/Occupational Therapy  -     IR SI Joint Injection w/Imaging; Future  -     Discontinue: celecoxib (CELEBREX) 200 MG capsule; Take 1 capsule (200 mg total) by mouth 2 (two) times daily. for 14 days  -     MRI Lumbar Spine Without Contrast; Future    Chronic right SI joint pain  -     IR SI Joint Injection w/Imaging; Future    Failed back syndrome  -     EMG W/ ULTRASOUND AND NERVE CONDUCTION TEST 2 Extremities; Future  -     Ambulatory Referral to Physical/Occupational Therapy  -     IR SI Joint Injection w/Imaging; Future  -     Discontinue: celecoxib (CELEBREX) 200 MG capsule; Take 1 capsule (200 mg total) by mouth 2 (two) times daily. for 14 days  -     MRI Lumbar Spine Without Contrast; Future    Smoking    Class 1 obesity due to excess calories with serious comorbidity and body mass index (BMI) of 30.0 to 30.9 in adult    Other orders  -     celecoxib (CELEBREX) 200 MG capsule; Take 1 capsule (200 mg total) by mouth 2 (two) times daily. for 14 days        I, Dr. Brent Lewis, personally performed the services described in this documentation. All medical record entries made by the scribe were at my direction and in my presence.  I have reviewed the chart and agree that the record reflects my personal performance and is accurate and complete. Brent Lewis MD.  3:54 PM 02/15/2019

## 2019-02-14 NOTE — LETTER
February 15, 2019      Lynne Parsons, NP  [address]  [address]  [place] [address]           [facility] - Neurosurgery 7th Fl  [address]  [facility] [address]  Phone: [phone]          Patient: Alessia Nelson   MR Number: [id number]   YOB: 1955   Date of Visit: 2/14/2019       Dear Lynne Parsons:    Thank you for referring Alessia Nelson to me for evaluation. Attached you will find relevant portions of my assessment and plan of care.    If you have questions, please do not hesitate to call me. I look forward to following Alessia Nelson along with you.    Sincerely,    Brent Lewis MD    Enclosure  CC:  No Recipients    If you would like to receive this communication electronically, please contact [email or web address] or [phone] to request more information on [employer] Link access.    For providers and/or their staff who would like to refer a patient to [facility], please contact us through our one-stop-shop provider referral line, [facility], at [phone].    If you feel you have received this communication in error or would no longer like to receive these types of communications, please e-mail [email or web address]

## 2019-02-15 PROBLEM — E66.09 CLASS 1 OBESITY DUE TO EXCESS CALORIES WITH SERIOUS COMORBIDITY AND BODY MASS INDEX (BMI) OF 30.0 TO 30.9 IN ADULT: Status: ACTIVE | Noted: 2019-02-15

## 2019-02-15 PROBLEM — E66.811 CLASS 1 OBESITY DUE TO EXCESS CALORIES WITH SERIOUS COMORBIDITY AND BODY MASS INDEX (BMI) OF 30.0 TO 30.9 IN ADULT: Status: ACTIVE | Noted: 2019-02-15

## 2019-03-18 ENCOUNTER — HOSPITAL ENCOUNTER (OUTPATIENT)
Dept: INTERVENTIONAL RADIOLOGY/VASCULAR | Facility: HOSPITAL | Age: 64
Discharge: HOME | End: 2019-03-18
Attending: NEUROLOGICAL SURGERY
Payer: MEDICARE

## 2019-03-18 DIAGNOSIS — M48.061 LUMBAR FORAMINAL STENOSIS: ICD-10-CM

## 2019-03-18 DIAGNOSIS — M96.1 FAILED BACK SYNDROME: ICD-10-CM

## 2019-03-18 DIAGNOSIS — G89.29 CHRONIC RIGHT SI JOINT PAIN: ICD-10-CM

## 2019-03-18 DIAGNOSIS — M53.3 CHRONIC RIGHT SI JOINT PAIN: ICD-10-CM

## 2019-03-18 PROCEDURE — A4215 STERILE NEEDLE: HCPCS

## 2019-03-18 PROCEDURE — 27096 INJECT SACROILIAC JOINT: CPT | Mod: RT,,, | Performed by: RADIOLOGY

## 2019-03-18 PROCEDURE — 27096 IR SI JOINT INJECTION W/IMAGING: ICD-10-PCS | Mod: RT,,, | Performed by: RADIOLOGY

## 2019-03-18 NOTE — H&P
Radiology History & Physical      SUBJECTIVE:     Chief Complaint: Chronic lower back pain and SI joint symptoms    History of Present Illness:  Alessia Nelson is a 63 y.o. female who presents for right SI joint injection.  Past Medical History:   Diagnosis Date    CHF (congestive heart failure)     COPD (chronic obstructive pulmonary disease)     Depression     Hypertension     Thyroid disease      Past Surgical History:   Procedure Laterality Date    instestine      PARATHYROID GLAND SURGERY      3 surgeries       Home Meds:   Prior to Admission medications    Medication Sig Start Date End Date Taking? Authorizing Provider   acetaminophen (TYLENOL) 325 MG tablet Take 2 tablets (650 mg total) by mouth every 6 (six) hours as needed (or equal to 101 degree F). 12/24/13   Natividad Yanes MD   allopurinol (ZYLOPRIM) 300 MG tablet  11/16/18   Historical Provider, MD   amitriptyline (ELAVIL) 100 MG tablet Take 100 mg by mouth every evening.    Historical Provider, MD   amlodipine (NORVASC) 5 MG tablet Take 5 mg by mouth once daily.    Historical Provider, MD   cloNIDine 0.3 mg/24 hr td ptwk (CATAPRES) 0.3 mg/24 hr Place 1 patch onto the skin.    Historical Provider, MD   clopidogrel (PLAVIX) 75 mg tablet Take 1 tablet (75 mg total) by mouth once daily. 1/11/19 1/11/20  Lynne Parsons NP   clopidogrel (PLAVIX) 75 mg tablet Take 1 tablet (75 mg total) by mouth once daily. 1/14/19 1/14/20  Charles Fraga MD   colestipol (COLESTID) 1 gram Tab  12/4/18   Historical Provider, MD   cyanocobalamin 1,000 mcg/mL injection INJECT 1 ML INTO THE MUSCLE Q OTHER WEEK. 9/12/18   Historical Provider, MD   escitalopram (LEXAPRO) 20 MG tablet Take 20 mg by mouth once daily.    Historical Provider, MD   gabapentin (NEURONTIN) 800 MG tablet Take 800 mg by mouth 4 (four) times daily.    Historical Provider, MD   glycopyrrolate (ROBINUL) 1 mg Tab TK 1 T PO BID 12/14/18   Lynne Parsons NP   HYDROcodone-acetaminophen  (NORCO) 7.5-325 mg per tablet TK 1 T PO  BID PRN PAIN 10/19/18   Historical Provider, MD   ipratropium-albuterol (COMBIVENT)  mcg/actuation inhaler Inhale 1 puff into the lungs 4 (four) times daily. Rescue 1/4/19   Lynne Parsons NP   levoFLOXacin (LEVAQUIN) 500 MG tablet Take 1 tablet (500 mg total) by mouth once daily. 1/4/19   Lynne Parsons NP   levothyroxine (SYNTHROID) 50 MCG tablet Take 50 mcg by mouth once daily.    Historical Provider, MD   lisinopril (PRINIVIL,ZESTRIL) 40 MG tablet Take 0.5 tablets (20 mg total) by mouth daily with dinner or evening meal. 12/24/13 12/24/14  Natividad Yanes MD   magnesium oxide (MAG-OX) 400 mg tablet Take 1 tablet (400 mg total) by mouth once daily. 12/24/13   Natividad Yanes MD   metoprolol tartrate (LOPRESSOR) 100 MG tablet Take 1 tablet (100 mg total) by mouth 2 (two) times daily. 1/14/19   Charles Fraga MD   nifedipine 30 MG ORAL TR24 (PROCARDIA-XL) 30 MG (OSM) 24 hr tablet Take 1 tablet (30 mg total) by mouth once daily. 12/24/13 12/24/14  Natividad Yanes MD   ondansetron (ZOFRAN) 4 MG tablet Take 8 mg by mouth every 8 (eight) hours.    Historical Provider, MD   pantoprazole (PROTONIX) 40 MG tablet Take 40 mg by mouth once daily.    Historical Provider, MD   potassium chloride SA (K-DUR,KLOR-CON) 20 MEQ tablet Take 1 tablet (20 mEq total) by mouth once daily. 12/24/13   Natividad Yanes MD   traZODone (DESYREL) 100 MG tablet Take 1 tablet (100 mg total) by mouth every evening. 1/4/19 1/4/20  Lynne Parsons NP     Anticoagulants/Antiplatelets: Plavix, last dose Friday 3/15/19    Allergies:   Review of patient's allergies indicates:   Allergen Reactions    Aspirin     Phenergan [promethazine]     Promethazine hcl      Sedation History:  no adverse reactions    Review of Systems:   Hematological: no known coagulopathies  Respiratory: no shortness of breath  Cardiovascular: no chest pain  Gastrointestinal: no abdominal  pain  Genito-Urinary: no dysuria  Musculoskeletal: negative  positive for - lower back pain  Neurological: no TIA or stroke symptoms         OBJECTIVE:     Vital Signs (Most Recent)       Physical Exam:  ASA: II  Mallampati: III    General: no acute distress  Mental Status: alert and oriented to person, place and time  HEENT: normocephalic, atraumatic  Chest: unlabored breathing  Heart: regular heart rate  Abdomen: nondistended  Extremity: moves all extremities    Laboratory  No results found for: INR    Lab Results   Component Value Date    WBC 16.80 (H) 12/24/2013    HGB 10.3 (L) 12/24/2013    HCT 31.9 (L) 12/24/2013    MCV 93 12/24/2013     12/24/2013      Lab Results   Component Value Date    GLU 97 12/24/2013     12/24/2013    K 3.2 (L) 12/24/2013     (H) 12/24/2013    CO2 23 12/24/2013    BUN 5 (L) 12/24/2013    CREATININE 0.7 12/24/2013    CALCIUM 9.4 12/24/2013    MG 1.5 (L) 12/24/2013    ALT 12 12/24/2013    AST 15 12/24/2013    ALBUMIN 2.8 (L) 12/24/2013    BILITOT 0.5 12/24/2013       ASSESSMENT/PLAN:     Sedation Plan: Local  Patient will undergo right SI joint injection.    Robert Ambriz M.D.   PGY-2  Radiology

## 2019-03-18 NOTE — PROGRESS NOTES
right SI joint injection complete, dressing applied, CDI. No acute events. Pt given discharge instructions/handouts. Questions answered, pt verbalizes understanding. Pt to lobby.

## 2019-03-18 NOTE — DISCHARGE INSTRUCTIONS
Please call with any questions or concerns.      Monday thru Friday 8:00 am - 4:30 pm    Interventional Radiology   [phone]    After Hours    Ask for the Radiology Intern on call  [phone]        Joint Aspiration: Your Experience  The treatment is done in a hospital, surgery center, or a doctor's office. Youll be asked to fill out some forms, including a consent form. You may also be examined.  During the procedure  You will lie on an exam table on your stomach. During your treatment:  · The skin over the aspiration site is cleaned. A pain medicine (local anesthetic) numbs the skin.  · X-ray imaging (fluoroscopy) may be used to help the doctor see your joint. If so, a contrast dye may be injected into the affected area.  · The aspiration is performed.   After the procedure  Most often, you can go home shortly after the procedure, generally in about an hour. Have an adult friend or relative drive you. The anesthetic wears off in a few hours. When it does, your hip  may feel more sore than usual. This is normal. Take it easy for the rest of the day. TYour provider can tell you when its OK to go back to work.  Risks and complications  Risks and complications are rare, but can include:  · Infection  · Bleeding  · Prolonged increase in pain  · Nerve damage (very rare)   When to call your healthcare provider  Call your provider if you have any of these:  · Severe headaches  · Fever over 100.4°F (38°C), chills, redness or drainage at the injection site  · Weakness, tingling, or numbness in your arms or legs   Date Last Reviewed: 9/21/2015 © 2000-2016 [employer]. [address] [place] [address], [place] [address]. All rights reserved. This information is not intended as a substitute for professional medical care. Always follow your healthcare professional's instructions.

## 2019-03-27 ENCOUNTER — CLINICAL SUPPORT (OUTPATIENT)
Dept: REHABILITATION | Facility: HOSPITAL | Age: 64
End: 2019-03-27
Payer: MEDICARE

## 2019-03-27 DIAGNOSIS — M54.41 CHRONIC BILATERAL LOW BACK PAIN WITH RIGHT-SIDED SCIATICA: Primary | ICD-10-CM

## 2019-03-27 DIAGNOSIS — M79.7 FIBROMYALGIA: ICD-10-CM

## 2019-03-27 DIAGNOSIS — G89.29 CHRONIC BILATERAL LOW BACK PAIN WITH RIGHT-SIDED SCIATICA: Primary | ICD-10-CM

## 2019-03-27 PROCEDURE — G8979 MOBILITY GOAL STATUS: HCPCS | Mod: CK,PN

## 2019-03-27 PROCEDURE — 97161 PT EVAL LOW COMPLEX 20 MIN: CPT | Mod: PN

## 2019-03-27 PROCEDURE — G8978 MOBILITY CURRENT STATUS: HCPCS | Mod: CL,PN

## 2019-03-27 NOTE — PLAN OF CARE
[facility]  Physical Therapy Initial Evaluation    Name: Alessia Nelson  Clinic Number: [id number]    Therapy Diagnosis:   Encounter Diagnoses   Name Primary?    Fibromyalgia     Chronic bilateral low back pain with right-sided sciatica Yes     Physician: Brent Lewis,*    Physician Orders: PT Eval and Treat   Medical Diagnosis: lumbar stenosis   Evaluation Date: 3/27/2019  Authorization period Expiration: 12/31/2019  Plan of Care Certification Period: 6/30/2019    Visit #: 1/ Visits authorized: 12  Time In:1:00 PM  Time Out: 2:00 PM  Total Billable Time: 50 minutes    Precautions: Standard and Fall    Subjective   Date of onset: chronic   Date of Surgery: last lumbar surgery was in 2012 - fusion     Past Medical History:   Diagnosis Date    CHF (congestive heart failure)     COPD (chronic obstructive pulmonary disease)     Depression     Hypertension     Thyroid disease      Alessia Nelson  has a past surgical history that includes Parathyroid gland surgery and instestine.    Alessia has a current medication list which includes the following prescription(s): acetaminophen, allopurinol, amitriptyline, amlodipine, clonidine 0.3 mg/24 hr td ptwk, clopidogrel, clopidogrel, colestipol, cyanocobalamin, escitalopram oxalate, gabapentin, glycopyrrolate, hydrocodone-acetaminophen, ipratropium-albuterol, levofloxacin, levothyroxine, lisinopril, magnesium oxide, metoprolol tartrate, nifedipine, ondansetron, pantoprazole, potassium chloride sa, and trazodone.    Review of patient's allergies indicates:   Allergen Reactions    Aspirin     Phenergan [promethazine]     Promethazine hcl             Prior Therapy: Alessia reports that she has had therapy at some point along the line for her lower back, but she is unsure exactly when. She had therapy following her fall in 2014 for Left Shoulder RTC repair.      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   Prior Level of Function: long history of neck/low back pain, functional for daily activities;   Current Level of Function: Alessia stated that she is painfree when swimming in her pool - she uses this as her form of exercise when the weather is warm.  When she cannot use the pool, she manages the best she can; ambulatory indep, ADL's indep, limited time able to stand,sit, walk due to pain.       Pain: current 6/10, worst 8/10, best 5/10,   Aching, Burning, Grabbing, Tight and Shooting, constant  Radicular symptoms: Pain into right leg distally into foot  Aggravating Factors: Sitting, Standing, Bending, Walking, Flexing and Lifting  Easing Factors: relaxation, pain medication and rest      Onset/BENITEZ: gradual    History of current condition - Alessia reports: long history of  symptoms, including chronic lower back pain, bilateral LE pain;  She had an SI Joint (R) injection on 3/18/2019 - reports that she notes no improvement in pain from this.  Reports decreased sensation in Feet from neuropathy- diabetic     Pts goals: To do whatever I can to improve my quality of life       Objective     Observation: Alessia is pleasant, cooperative, wants to try all of her options to help improve her pain and mobility.     Posture:    -       Rounded shoulders  -       Forward head  -       Posterior pelvic tilt    Lumbar Range of Motion:    Degrees Pain   Flexion 30           Extension <10           Left Side Bending Hand to knee         Right Side Bending Hand to knee         Left rotation   10         Right Rotation   10            Lower Extremity Strength  Right LE  Left LE    Knee extension: 5/5 Knee extension: 5/5   Knee flexion: 5/5 Knee flexion: 5/5   Hip flexion: 4+/5 Hip flexion: 5/5   Hip extension:  4+/5 Hip extension: 4+/5   Hip abduction: 4/5 Hip abduction: 4+/5   Hip adduction: 4+/5  Hip adduction 4+/5   Ankle dorsiflexion: 4-/5 Ankle dorsiflexion: 4-/5   Ankle plantarflexion: 3/5 Ankle plantarflexion: 3/5   Upper abdominals 3+/5     Lower abdominals 3+/5     Back extensors 3/5  30 sec Sit <>Stand 7     Special Tests:    Repeated Flexion Negative   Repeated Extension Positive   Prone Instability Negative   Straight Leg Raise Negative   Slump Negative   Quadrant Negative   Femoral nerve test Negative       DTR:   Right Left Comment   Patellar (L3-4) 2+ 2+    Achilles (S1) 2+ 2+        Joint Mobility:   Lumbar: CPA restricted,   RPA restricted  LPA restricted    Thoracic: restricted    Palpation: moderate tenderness to palpation at bilateral SI joints; Bilateral paraspinal mm    Sensation: decreased sensation to light touch noted in bilateral feet; intact to deep pressure in bilateral feet.  Sensation is intact in upper and lower legs to light touch     Flexibility:     90/90 SLR = R minimal restriction, L minimal restriction   Ely's test: R no restriction, L no restriction   Prashanth's test: R no restriction, L no restriction   Bebeto test: R minimal restriction, L minimal restriction      Functional Limitations Reports - G Codes  Category: Mobility, Body position, Carrying, Self care, Other  Tool: Oswestry   Score: 64%  Current:CL = least 60% but < 80% impaired, limited or restricted  Goal:CK = at least 40% but < 60% impaired, limited or restricted    PT Evaluation Completed: Yes  Discussed Plan of Care with patient: Yes    Home Exercises and Patient Education Provided  Education provided re:   - progress towards goals   - role of therapy in multi - disciplinary team, goals for therapy  Pt educated on condition, POC, and expectations in therapy.  No spiritual or educational barriers to learning provided    Home exercises:  Pt will be provided HEP during course of treatment with progressions as appropriate. Pt was advised to perform these exercises free of pain, and to stop performing them if pain  occurs.   Alessia demonstrated good  understanding of the education provided.     Assessment     Alessia is a 63 y.o. female referred to outpatient physical therapy and presents to PT with failed back syndrome . Patient demonstrates limitations as described in the problem list. Pt will benefit from physcial therapy services in order to maximize pain free and/or functional use of bilateral LE's and improved mobility in spine. The following goals were discussed with the patient and patient is in agreement with them as to be addressed in the treatment plan.     Anticipated barriers to physical therapy: none    Medical necessity is demonstrated by the following IMPAIRMENTS/PROBLEM LIST:    impaired endurance, impaired sensation, impaired functional mobility, decreased lower extremity function, pain and decreased ROM        GOALS:  Long Term Goals: 6 weeks    Pain: Decrease pain to 4/10 to allow for improved ability to perform daily/recreational activities   Strength: Improve strength in core muscles to 4/5 for improved lumbopelvic stability  ROM: Improve ROM to 75% of normal limits   Functional scale: Improve score on Oswestry  to <50%  Lifting: Lift 20 lbs to waist level, 10 lbs to shoulder level, 10 lbs to overhead without pain or compensation  Walking: Increase walking distance and/or duration to 1 mile without pain   Postures: Increase sitting and/or standing duration to 30 mins  without pain   Transfers: Perform floor to standing transfers without increased pain or limitation  Exercise: demonstrate independence with home exercise program to maintain gains made in therapy.        Plan     Certification Period: 3/27/2019 to 6/30/2019.    Outpatient physical therapy 2 times weekly to include: Aquatic Therapy, Manual Therapy, Moist Heat/ Ice, Neuromuscular Re-ed, Patient Education, Self Care and Therapeutic Exercise. Cont PT for 2 months.   Pt may be seen by PTA as part of the rehabilitation team.     I certify the need  for these services furnished under this plan of treatment and while under my care.    Trinh Reyes, PT

## 2019-04-04 ENCOUNTER — CLINICAL SUPPORT (OUTPATIENT)
Dept: REHABILITATION | Facility: HOSPITAL | Age: 64
End: 2019-04-04
Payer: MEDICARE

## 2019-04-04 DIAGNOSIS — M54.41 CHRONIC BILATERAL LOW BACK PAIN WITH RIGHT-SIDED SCIATICA: Primary | ICD-10-CM

## 2019-04-04 DIAGNOSIS — G89.29 CHRONIC BILATERAL LOW BACK PAIN WITH RIGHT-SIDED SCIATICA: Primary | ICD-10-CM

## 2019-04-04 PROCEDURE — 97110 THERAPEUTIC EXERCISES: CPT | Mod: PN

## 2019-04-04 NOTE — PROGRESS NOTES
Physical Therapy Daily Note     Name: Alessia Nelson  Clinic Number: [id number]  Diagnosis:   Encounter Diagnosis   Name Primary?    Chronic bilateral low back pain with right-sided sciatica Yes     Physician: Brent Lewis,*  Precautions: standard  Visit #: 2 of 12  PTA Visit #: 0  Time In: 1:30 PM   Time Out: 2: 40 PM     Subjective     Pt reports: I'm just sore all over, I can't wait for the weather to get warmer so I can get in my pool.   Pain Scale: Alessia rates pain on a scale of 0-10 to be 4 currently.    Objective     Alessia received individual therapeutic exercises to develop strength, ROM, flexibility, posture and core stabilization for 45 minutes includin. DKTC with SB x 2.5 mins   2. Bridges x 10   3. LTR x 2.5 mins with towel between knees   4. Leg Lengthening x 10   5. Clavicle lengthening x 15  6. Clams/Reverse Clams  7. Prone - alternate arm and leg x 10  8. Prone - lumbar extension x 10  9. Cat/Camel - alternate with mid-back stretch   10. Seated LAQ's   11. Seated Lumbar flexion with SB     Alessia received the following manual therapy techniques:      The patient received the following direct contact modalities after being cleared for contraindications:     The patient received the following supervised modalities after being cleared for contradictions:     Written Home Exercises Provided: not at this time.   Pt demo good understanding of the education provided. Alessia demonstrated good return demonstration of activities.     Education provided re:  Alessia verbalized good understanding of education provided.   No spiritual or educational barriers to learning provided    Assessment     Patient tolerated treatment well; Needs encouragement to move more.   This is a 63 y.o. female referred to outpatient physical therapy and presents with a medical diagnosis of failed back surgery with chronic lumbar pain and demonstrates limitations as  described in the problem list. Pt prognosis is Good. Pt will continue to benefit from skilled outpatient physical therapy to address the deficits listed in the problem list, provide pt/family education and to maximize pt's level of independence in the home and community environment.     Goals as follows:  Long Term Goals: 6 weeks     Pain: Decrease pain to 4/10 to allow for improved ability to perform daily/recreational activities   Strength: Improve strength in core muscles to 4/5 for improved lumbopelvic stability  ROM: Improve ROM to 75% of normal limits   Functional scale: Improve score on Oswestry  to <50%  Lifting: Lift 20 lbs to waist level, 10 lbs to shoulder level, 10 lbs to overhead without pain or compensation  Walking: Increase walking distance and/or duration to 1 mile without pain   Postures: Increase sitting and/or standing duration to 30 mins  without pain   Transfers: Perform floor to standing transfers without increased pain or limitation  Exercise: demonstrate independence with home exercise program to maintain gains made in therapy.         Plan     Continue with established Plan of Care towards PT goals.    Therapist: Trinh Reyes, PT  4/4/2019

## 2019-04-09 ENCOUNTER — CLINICAL SUPPORT (OUTPATIENT)
Dept: REHABILITATION | Facility: HOSPITAL | Age: 64
End: 2019-04-09
Payer: MEDICARE

## 2019-04-09 DIAGNOSIS — G89.29 CHRONIC BILATERAL LOW BACK PAIN WITH RIGHT-SIDED SCIATICA: Primary | ICD-10-CM

## 2019-04-09 DIAGNOSIS — M54.41 CHRONIC BILATERAL LOW BACK PAIN WITH RIGHT-SIDED SCIATICA: Primary | ICD-10-CM

## 2019-04-09 PROCEDURE — 97113 AQUATIC THERAPY/EXERCISES: CPT | Mod: PN

## 2019-04-09 NOTE — PROGRESS NOTES
Physical Therapy Daily Note     Name: Alessia Nelson  Clinic Number: [id number]  Diagnosis:   Encounter Diagnosis   Name Primary?    Chronic bilateral low back pain with right-sided sciatica Yes     Physician: Brent Lewis,*  Precautions: standard  Visit #: 3 of 12  PTA Visit #: 0  Time In: 1 45 PM   Time Out: 2: 40 PM     Subjective     Pt reports:  Alessia brought her swimming suit today  For aquatic exercises; she is still unable to use her pool at home secondary to weather.   Pain Scale: Alessia rates pain on a scale of 0-10 to be 4 currently.    Objective     Alessia received individual therapeutic exercises to develop strength, ROM, flexibility, posture and core stabilization for 45 minutes includin. DKTC with SB x 2.5 mins   2. Bridges x 10   3. LTR x 2.5 mins with towel between knees   4. Leg Lengthening x 10   5. Clavicle lengthening x 15  6. Clams/Reverse Clams  7. Prone - alternate arm and leg x 10  8. Prone - lumbar extension x 10  9. Cat/Camel - alternate with mid-back stretch   10. Seated LAQ's   11. Seated Lumbar flexion with SB     Aquatic Therapy today (2019) :  1:1 x 30 mins   1. Multidirectional walking   2. Hip exercises- 4 way  3. Squats  4. H/S Stretching  5. Lunges across pool  6. Trunk Side Bending  7. Trunk Rotation  8.  Heel Raises  9. Lateral lunges  10. Bicycle with noodle  11. DKC with noodle       Alessia received the following manual therapy techniques:      The patient received the following direct contact modalities after being cleared for contraindications:     The patient received the following supervised modalities after being cleared for contradictions:     Written Home Exercises Provided: not at this time.   Pt demo good understanding of the education provided. Alessia demonstrated good return demonstration of activities.     Education provided re:  Alessia verbalized good understanding of education provided.   No  spiritual or educational barriers to learning provided    Assessment     Patient tolerated treatment well; Needs encouragement to move more.   This is a 63 y.o. female referred to outpatient physical therapy and presents with a medical diagnosis of failed back surgery with chronic lumbar pain and demonstrates limitations as described in the problem list. Pt prognosis is Good. Pt will continue to benefit from skilled outpatient physical therapy to address the deficits listed in the problem list, provide pt/family education and to maximize pt's level of independence in the home and community environment.     Goals as follows:  Long Term Goals: 6 weeks     Pain: Decrease pain to 4/10 to allow for improved ability to perform daily/recreational activities   Strength: Improve strength in core muscles to 4/5 for improved lumbopelvic stability  ROM: Improve ROM to 75% of normal limits   Functional scale: Improve score on Oswestry  to <50%  Lifting: Lift 20 lbs to waist level, 10 lbs to shoulder level, 10 lbs to overhead without pain or compensation  Walking: Increase walking distance and/or duration to 1 mile without pain   Postures: Increase sitting and/or standing duration to 30 mins  without pain   Transfers: Perform floor to standing transfers without increased pain or limitation  Exercise: demonstrate independence with home exercise program to maintain gains made in therapy.         Plan     Continue with established Plan of Care towards PT goals.    Therapist: Trinh Reyes, PT  4/9/2019

## 2019-04-11 ENCOUNTER — CLINICAL SUPPORT (OUTPATIENT)
Dept: REHABILITATION | Facility: HOSPITAL | Age: 64
End: 2019-04-11
Payer: MEDICARE

## 2019-04-11 DIAGNOSIS — M54.41 CHRONIC BILATERAL LOW BACK PAIN WITH RIGHT-SIDED SCIATICA: Primary | ICD-10-CM

## 2019-04-11 DIAGNOSIS — G89.29 CHRONIC BILATERAL LOW BACK PAIN WITH RIGHT-SIDED SCIATICA: Primary | ICD-10-CM

## 2019-04-11 DIAGNOSIS — M79.7 FIBROMYALGIA: ICD-10-CM

## 2019-04-11 PROCEDURE — 97113 AQUATIC THERAPY/EXERCISES: CPT | Mod: PN

## 2019-04-11 NOTE — PROGRESS NOTES
Physical Therapy Daily Note     Name: Alessia Nelson  Clinic Number: [id number]  Diagnosis:   Encounter Diagnoses   Name Primary?    Chronic bilateral low back pain with right-sided sciatica Yes    Fibromyalgia      Physician: Brent Lewis,*  Precautions: standard  Visit #: 4 of 12  PTA Visit #: 0  Time In: 1 45 PM   Time Out: 2: 40 PM     Subjective     Pt reports:  Alessia said she was actually sore after her aquatic ex the other day, but stated that exercising in the water is much easier for her.  She is going for EMG/NCV test on LE's tomorrow. .   Pain Scale: Alessia rates pain on a scale of 0-10 to be 4 currently.    Objective     Alessia received individual therapeutic exercises to develop strength, ROM, flexibility, posture and core stabilization for 45 minutes includin. DKTC with SB x 2.5 mins   2. Bridges x 10   3. LTR x 2.5 mins with towel between knees   4. Leg Lengthening x 10   5. Clavicle lengthening x 15  6. Clams/Reverse Clams  7. Prone - alternate arm and leg x 10  8. Prone - lumbar extension x 10  9. Cat/Camel - alternate with mid-back stretch   10. Seated LAQ's   11. Seated Lumbar flexion with SB     Aquatic Therapy today (2019) :  1:1 x 30 mins   1. Multidirectional walking   2. Hip exercises- 4 way  3. Squats  4. H/S Stretching  5. Lunges across pool  6. Trunk Side Bending  7. Trunk Rotation  8.  Heel Raises  9. Lateral lunges  10. Bicycle with noodle  11. DKC with noodle       Alessia received the following manual therapy techniques:      The patient received the following direct contact modalities after being cleared for contraindications:     The patient received the following supervised modalities after being cleared for contradictions:     Written Home Exercises Provided: not at this time.   Pt demo good understanding of the education provided. Alessia demonstrated good return demonstration of activities.     Education provided  re:  Alessia verbalized good understanding of education provided.   No spiritual or educational barriers to learning provided    Assessment     Patient tolerated treatment well; Needs encouragement to move more.   This is a 63 y.o. female referred to outpatient physical therapy and presents with a medical diagnosis of failed back surgery with chronic lumbar pain and demonstrates limitations as described in the problem list. Pt prognosis is Good. Pt will continue to benefit from skilled outpatient physical therapy to address the deficits listed in the problem list, provide pt/family education and to maximize pt's level of independence in the home and community environment.     Goals as follows:  Long Term Goals: 6 weeks     Pain: Decrease pain to 4/10 to allow for improved ability to perform daily/recreational activities   Strength: Improve strength in core muscles to 4/5 for improved lumbopelvic stability  ROM: Improve ROM to 75% of normal limits   Functional scale: Improve score on Oswestry  to <50%  Lifting: Lift 20 lbs to waist level, 10 lbs to shoulder level, 10 lbs to overhead without pain or compensation  Walking: Increase walking distance and/or duration to 1 mile without pain   Postures: Increase sitting and/or standing duration to 30 mins  without pain   Transfers: Perform floor to standing transfers without increased pain or limitation  Exercise: demonstrate independence with home exercise program to maintain gains made in therapy.         Plan     Continue with established Plan of Care towards PT goals.    Therapist: Trinh Reyes, PT  4/11/2019

## 2019-04-12 ENCOUNTER — PROCEDURE VISIT (OUTPATIENT)
Dept: NEUROLOGY | Facility: CLINIC | Age: 64
End: 2019-04-12
Payer: MEDICARE

## 2019-04-12 VITALS — TEMPERATURE: 87 F | WEIGHT: 195.13 LBS | HEIGHT: 67 IN | BODY MASS INDEX: 30.63 KG/M2

## 2019-04-12 DIAGNOSIS — M96.1 FAILED BACK SYNDROME: ICD-10-CM

## 2019-04-12 DIAGNOSIS — M48.061 LUMBAR FORAMINAL STENOSIS: ICD-10-CM

## 2019-04-12 PROCEDURE — 95910 NRV CNDJ TEST 7-8 STUDIES: CPT | Mod: S$GLB,,, | Performed by: NEUROLOGICAL SURGERY

## 2019-04-12 PROCEDURE — 95886 PR EMG COMPLETE, W/ NERVE CONDUCTION STUDIES, 5+ MUSCLES: ICD-10-PCS | Mod: S$GLB,,, | Performed by: NEUROLOGICAL SURGERY

## 2019-04-12 PROCEDURE — 95886 MUSC TEST DONE W/N TEST COMP: CPT | Mod: S$GLB,,, | Performed by: NEUROLOGICAL SURGERY

## 2019-04-12 PROCEDURE — 99204 OFFICE O/P NEW MOD 45 MIN: CPT | Mod: 25,S$GLB,, | Performed by: NEUROLOGICAL SURGERY

## 2019-04-12 PROCEDURE — 99204 PR OFFICE/OUTPT VISIT, NEW, LEVL IV, 45-59 MIN: ICD-10-PCS | Mod: 25,S$GLB,, | Performed by: NEUROLOGICAL SURGERY

## 2019-04-12 PROCEDURE — 95910 PR NERVE CONDUCTION STUDY; 7-8 STUDIES: ICD-10-PCS | Mod: S$GLB,,, | Performed by: NEUROLOGICAL SURGERY

## 2019-04-12 NOTE — PROCEDURES
Chief Complaint   Patient presents with    Other Misc     EMG        Alessia Nelson is a 63 y.o. female with a history of multiple medical diagnoses as listed below that presents for EMG-nerve conduction studies to further the evaluate low back pain. She has a history of multiple surgeries most recently a spinal lumbar fusion in 2012 and previous cervical spinal surgery sometime in the past as well.  She says that after the fall she has been having increasing back pain.  The patient has been involved in physical therapy where she has been doing aquatic exercises and also says that she has been trying to maintain a high level of physical activity in her home food as well.  She says that this has been helpful in keeping her range of motion and pain to a minimal level compared to wear was before she began her exercise regimen, but she still feels very limited in does not have the ability to walk as much as she would like.  She says that this has hindered her from being able to travel and engage in other activities that she tends to find enjoyment in doing.    PAST MEDICAL HISTORY:  Past Medical History:   Diagnosis Date    CHF (congestive heart failure)     COPD (chronic obstructive pulmonary disease)     Depression     Hypertension     Thyroid disease        PAST SURGICAL HISTORY:  Past Surgical History:   Procedure Laterality Date    instestine      PARATHYROID GLAND SURGERY      3 surgeries       SOCIAL HISTORY:  Social History     Socioeconomic History    Marital status: Single     Spouse name: Not on file    Number of children: 0    Years of education: Not on file    Highest education level: Not on file   Occupational History    Not on file   Social Needs    Financial resource strain: Not on file    Food insecurity:     Worry: Not on file     Inability: Not on file    Transportation needs:     Medical: Not on file     Non-medical: Not on file   Tobacco Use    Smoking status: Current Every Day  Smoker     Packs/day: 1.00     Years: 15.00     Pack years: 15.00   Substance and Sexual Activity    Alcohol use: No    Drug use: No    Sexual activity: Not Currently   Lifestyle    Physical activity:     Days per week: Not on file     Minutes per session: Not on file    Stress: Not on file   Relationships    Social connections:     Talks on phone: Not on file     Gets together: Not on file     Attends Taoism service: Not on file     Active member of club or organization: Not on file     Attends meetings of clubs or organizations: Not on file     Relationship status: Not on file   Other Topics Concern    Not on file   Social History Narrative    Not on file       FAMILY HISTORY:  Family History   Problem Relation Age of Onset    Stroke Maternal Grandmother     Cancer Maternal Grandfather     Stroke Mother     Heart disease Father        ALLERGIES AND MEDICATIONS: updated and reviewed.  Review of patient's allergies indicates:   Allergen Reactions    Aspirin     Phenergan [promethazine]     Promethazine hcl      Current Outpatient Medications   Medication Sig Dispense Refill    acetaminophen (TYLENOL) 325 MG tablet Take 2 tablets (650 mg total) by mouth every 6 (six) hours as needed (or equal to 101 degree F).      allopurinol (ZYLOPRIM) 300 MG tablet       amitriptyline (ELAVIL) 100 MG tablet Take 100 mg by mouth every evening.      amlodipine (NORVASC) 5 MG tablet Take 5 mg by mouth once daily.      cloNIDine 0.3 mg/24 hr td ptwk (CATAPRES) 0.3 mg/24 hr Place 1 patch onto the skin.      clopidogrel (PLAVIX) 75 mg tablet Take 1 tablet (75 mg total) by mouth once daily. 14 tablet 0    clopidogrel (PLAVIX) 75 mg tablet Take 1 tablet (75 mg total) by mouth once daily. 90 tablet 3    colestipol (COLESTID) 1 gram Tab       cyanocobalamin 1,000 mcg/mL injection INJECT 1 ML INTO THE MUSCLE Q OTHER WEEK.  2    escitalopram (LEXAPRO) 20 MG tablet Take 20 mg by mouth once daily.      gabapentin  (NEURONTIN) 800 MG tablet Take 800 mg by mouth 4 (four) times daily.      glycopyrrolate (ROBINUL) 1 mg Tab TK 1 T PO BID 90 tablet 3    HYDROcodone-acetaminophen (NORCO) 7.5-325 mg per tablet TK 1 T PO  BID PRN PAIN  0    ipratropium-albuterol (COMBIVENT)  mcg/actuation inhaler Inhale 1 puff into the lungs 4 (four) times daily. Rescue 1 Package 0    levoFLOXacin (LEVAQUIN) 500 MG tablet Take 1 tablet (500 mg total) by mouth once daily. 10 tablet 0    levothyroxine (SYNTHROID) 50 MCG tablet Take 50 mcg by mouth once daily.      lisinopril (PRINIVIL,ZESTRIL) 40 MG tablet Take 0.5 tablets (20 mg total) by mouth daily with dinner or evening meal. 30 tablet 3    magnesium oxide (MAG-OX) 400 mg tablet Take 1 tablet (400 mg total) by mouth once daily. 30 tablet 1    metoprolol tartrate (LOPRESSOR) 100 MG tablet Take 1 tablet (100 mg total) by mouth 2 (two) times daily. 90 tablet 3    nifedipine 30 MG ORAL TR24 (PROCARDIA-XL) 30 MG (OSM) 24 hr tablet Take 1 tablet (30 mg total) by mouth once daily. 30 tablet 3    ondansetron (ZOFRAN) 4 MG tablet Take 8 mg by mouth every 8 (eight) hours.      pantoprazole (PROTONIX) 40 MG tablet Take 40 mg by mouth once daily.      potassium chloride SA (K-DUR,KLOR-CON) 20 MEQ tablet Take 1 tablet (20 mEq total) by mouth once daily. 30 tablet 3    traZODone (DESYREL) 100 MG tablet Take 1 tablet (100 mg total) by mouth every evening. 30 tablet 2     No current facility-administered medications for this visit.        Review of Systems   Constitutional: Negative for activity change, appetite change, fever and unexpected weight change.   HENT: Negative for trouble swallowing and voice change.    Eyes: Negative for photophobia and visual disturbance.   Respiratory: Negative for apnea and shortness of breath.    Cardiovascular: Negative for chest pain and leg swelling.   Gastrointestinal: Negative for constipation and nausea.   Genitourinary: Negative for difficulty urinating.    Musculoskeletal: Positive for back pain. Negative for gait problem and neck pain.   Skin: Negative for color change and pallor.   Neurological: Positive for numbness. Negative for dizziness, seizures, syncope and weakness.   Hematological: Negative for adenopathy.   Psychiatric/Behavioral: Negative for agitation, confusion and decreased concentration.       Neurologic Exam     Mental Status   Oriented to person, place, and time.   Registration: recalls 3 of 3 objects.   Attention: normal. Concentration: normal.   Speech: speech is normal   Level of consciousness: alert  Knowledge: good.     Cranial Nerves     CN II   Visual fields full to confrontation.   Right visual field deficit: none  Left visual field deficit: none     CN III, IV, VI   Pupils are equal, round, and reactive to light.  Extraocular motions are normal.   Right pupil: Size: 3 mm. Shape: regular. Accommodation: intact.   Left pupil: Size: 3 mm. Shape: regular. Accommodation: intact.   CN III: no CN III palsy  CN VI: no CN VI palsy  Nystagmus: none   Diplopia: none  Ophthalmoparesis: none  Upgaze: normal  Downgaze: normal  Conjugate gaze: present    CN V   Facial sensation intact.   Right facial sensation deficit: none  Left facial sensation deficit: none    CN VII   Facial expression full, symmetric.   Right facial weakness: none  Left facial weakness: none    CN VIII   CN VIII normal.     CN IX, X   CN IX normal.   CN X normal.   Palate: symmetric    CN XI   CN XI normal.   Right sternocleidomastoid strength: normal  Left sternocleidomastoid strength: normal  Right trapezius strength: normal  Left trapezius strength: normal    CN XII   CN XII normal.   Tongue deviation: none    Motor Exam   Muscle bulk: normal  Overall muscle tone: normal  Right arm tone: normal  Left arm tone: normal  Right leg tone: normal  Left leg tone: normal    Strength   Strength 5/5 throughout.     Sensory Exam   Right arm light touch: normal  Left arm light touch:  normal  Right leg light touch: decreased from ankle  Left leg light touch: decreased from ankle  Right arm vibration: normal  Left arm vibration: normal  Right leg vibration: normal  Left leg vibration: normal  Right arm proprioception: normal  Left arm proprioception: normal  Right leg proprioception: normal  Left leg proprioception: normal  Right arm pinprick: normal  Left arm pinprick: normal  Right leg pinprick: decreased from ankle  Left leg pinprick: decreased from ankle    Gait, Coordination, and Reflexes     Gait  Gait: normal    Coordination   Romberg: negative  Finger to nose coordination: normal  Heel to shin coordination: normal  Tandem walking coordination: normal    Tremor   Resting tremor: absent    Reflexes   Right brachioradialis: 2+  Left brachioradialis: 2+  Right biceps: 2+  Left biceps: 2+  Right triceps: 2+  Left triceps: 2+  Right patellar: 2+  Left patellar: 2+  Right achilles: 2+  Left achilles: 2+  Right plantar: normal  Left plantar: normal      Physical Exam   Constitutional: She is oriented to person, place, and time. She appears well-developed and well-nourished.   HENT:   Head: Normocephalic and atraumatic.   Eyes: Pupils are equal, round, and reactive to light. EOM are normal.   Neck: Normal range of motion.   Cardiovascular: Normal rate and intact distal pulses.   Pulmonary/Chest: Effort normal. No apnea. No respiratory distress.   Musculoskeletal: Normal range of motion.   Neurological: She is alert and oriented to person, place, and time. She has normal strength. She has a normal Finger-Nose-Finger Test, a normal Heel to Shin Test, a normal Romberg Test and a normal Tandem Gait Test. Gait normal.   Reflex Scores:       Tricep reflexes are 2+ on the right side and 2+ on the left side.       Bicep reflexes are 2+ on the right side and 2+ on the left side.       Brachioradialis reflexes are 2+ on the right side and 2+ on the left side.       Patellar reflexes are 2+ on the right side  "and 2+ on the left side.       Achilles reflexes are 2+ on the right side and 2+ on the left side.  Skin: Skin is warm and dry.   Psychiatric: She has a normal mood and affect. Her speech is normal and behavior is normal. Thought content normal.   Vitals reviewed.      Vitals:    04/12/19 0952   Temp: (!) 86.7 °F (30.4 °C)   Weight: 88.5 kg (195 lb 1.7 oz)   Height: 5' 7" (1.702 m)       Assessment & Plan:    Problem List Items Addressed This Visit     None      Visit Diagnoses     Failed back syndrome        Lumbar foraminal stenosis              Follow-up: Follow up if symptoms worsen or fail to improve.      "

## 2019-04-15 DIAGNOSIS — F51.01 PRIMARY INSOMNIA: ICD-10-CM

## 2019-04-16 ENCOUNTER — CLINICAL SUPPORT (OUTPATIENT)
Dept: REHABILITATION | Facility: HOSPITAL | Age: 64
End: 2019-04-16
Payer: MEDICARE

## 2019-04-16 DIAGNOSIS — M79.7 FIBROMYALGIA: ICD-10-CM

## 2019-04-16 DIAGNOSIS — G89.29 CHRONIC BILATERAL LOW BACK PAIN WITH RIGHT-SIDED SCIATICA: Primary | ICD-10-CM

## 2019-04-16 DIAGNOSIS — M54.41 CHRONIC BILATERAL LOW BACK PAIN WITH RIGHT-SIDED SCIATICA: Primary | ICD-10-CM

## 2019-04-16 PROCEDURE — 97110 THERAPEUTIC EXERCISES: CPT | Mod: PN

## 2019-04-16 NOTE — PROGRESS NOTES
Physical Therapy Daily Note     Name: Alessia Nelson  Clinic Number: [id number]  Diagnosis:   Encounter Diagnoses   Name Primary?    Chronic bilateral low back pain with right-sided sciatica Yes    Fibromyalgia      Physician: Brent Lewis,*  Precautions: standard  Visit #: 4 of 12  PTA Visit #: 0  Time In: 1 45 PM   Time Out: 2: 40 PM     Subjective     Pt reports:  Alessia said she was actually sore after her aquatic ex the other day, but stated that exercising in the water is much easier for her.  She is going for EMG/NCV test on LE's tomorrow. .   Pain Scale: Alessia rates pain on a scale of 0-10 to be 4 currently.    Objective     Alessia received individual therapeutic exercises to develop strength, ROM, flexibility, posture and core stabilization for 30  minutes includin. DKTC with SB x 2.5 mins   2. Bridges x 10   3. LTR x 2.5 mins with towel between knees   4. Leg Lengthening x 10   5. Clavicle lengthening x 15  6. Clams/Reverse Clams  7. Prone - alternate arm and leg x 10  8. Prone - lumbar extension x 10  9. Cat/Camel - alternate with mid-back stretch   10. Seated LAQ's   11. Seated Lumbar flexion with SB     Aquatic Therapy today (2019) :  1:1 x 15 mins   1. Multidirectional walking   2. Hip exercises- 4 way  3. Squats  4. H/S Stretching  5. Lunges across pool  6. Trunk Side Bending  7. Trunk Rotation  8.  Heel Raises  9. Lateral lunges  10. Bicycle with noodle  11. DKC with noodle       Alessia received the following manual therapy techniques:      The patient received the following direct contact modalities after being cleared for contraindications:     The patient received the following supervised modalities after being cleared for contradictions:     Written Home Exercises Provided: not at this time.   Pt demo good understanding of the education provided. Alessia demonstrated good return demonstration of activities.     Education  provided re:  Alessia verbalized good understanding of education provided.   No spiritual or educational barriers to learning provided    Assessment     Patient tolerated treatment well; Needs encouragement to move more. Went for EMG/NCV tests last Friday - definite sensory loss in bilateral ankles and feet; Motor deficits in bilateral lower legs - Ant Tib, Peroneal nerves. Alessia does very well with aquatic ex, improving with tolerance of land based.   This is a 63 y.o. female referred to outpatient physical therapy and presents with a medical diagnosis of failed back surgery with chronic lumbar pain and demonstrates limitations as described in the problem list. Pt prognosis is Good. Pt will continue to benefit from skilled outpatient physical therapy to address the deficits listed in the problem list, provide pt/family education and to maximize pt's level of independence in the home and community environment.     Goals as follows:  Long Term Goals: 6 weeks     Pain: Decrease pain to 4/10 to allow for improved ability to perform daily/recreational activities   Strength: Improve strength in core muscles to 4/5 for improved lumbopelvic stability  ROM: Improve ROM to 75% of normal limits   Functional scale: Improve score on Oswestry  to <50%  Lifting: Lift 20 lbs to waist level, 10 lbs to shoulder level, 10 lbs to overhead without pain or compensation  Walking: Increase walking distance and/or duration to 1 mile without pain   Postures: Increase sitting and/or standing duration to 30 mins  without pain   Transfers: Perform floor to standing transfers without increased pain or limitation  Exercise: demonstrate independence with home exercise program to maintain gains made in therapy.         Plan     Continue with established Plan of Care towards PT goals.    Therapist: Trinh Reyes, PT  4/16/2019

## 2019-04-17 RX ORDER — TRAZODONE HYDROCHLORIDE 100 MG/1
100 TABLET ORAL NIGHTLY
Qty: 30 TABLET | Refills: 2 | Status: SHIPPED | OUTPATIENT
Start: 2019-04-17 | End: 2019-08-01 | Stop reason: SDUPTHER

## 2019-04-18 ENCOUNTER — HOSPITAL ENCOUNTER (OUTPATIENT)
Facility: HOSPITAL | Age: 64
Discharge: HOME | End: 2019-04-19
Attending: FAMILY MEDICINE | Admitting: INTERNAL MEDICINE
Payer: MEDICARE

## 2019-04-18 DIAGNOSIS — K85.90 ACUTE PANCREATITIS, UNSPECIFIED COMPLICATION STATUS, UNSPECIFIED PANCREATITIS TYPE: Primary | ICD-10-CM

## 2019-04-18 DIAGNOSIS — F17.210 CIGARETTE NICOTINE DEPENDENCE WITHOUT COMPLICATION: ICD-10-CM

## 2019-04-18 LAB
ALBUMIN SERPL BCP-MCNC: 4.4 G/DL (ref 3.5–5.2)
ALP SERPL-CCNC: 92 U/L (ref 55–135)
ALT SERPL W/O P-5'-P-CCNC: 14 U/L (ref 10–44)
ANION GAP SERPL CALC-SCNC: 11 MMOL/L (ref 8–16)
AST SERPL-CCNC: 17 U/L (ref 10–40)
BASOPHILS # BLD AUTO: 0.07 K/UL (ref 0–0.2)
BASOPHILS NFR BLD: 0.5 % (ref 0–1.9)
BILIRUB SERPL-MCNC: 0.4 MG/DL (ref 0.1–1)
BUN SERPL-MCNC: 13 MG/DL (ref 8–23)
CALCIUM SERPL-MCNC: 9.8 MG/DL (ref 8.7–10.5)
CHLORIDE SERPL-SCNC: 107 MMOL/L (ref 95–110)
CO2 SERPL-SCNC: 19 MMOL/L (ref 23–29)
CREAT SERPL-MCNC: 0.8 MG/DL (ref 0.5–1.4)
DIFFERENTIAL METHOD: ABNORMAL
EOSINOPHIL # BLD AUTO: 0.4 K/UL (ref 0–0.5)
EOSINOPHIL NFR BLD: 2.4 % (ref 0–8)
ERYTHROCYTE [DISTWIDTH] IN BLOOD BY AUTOMATED COUNT: 14 % (ref 11.5–14.5)
EST. GFR  (AFRICAN AMERICAN): >60 ML/MIN/1.73 M^2
EST. GFR  (NON AFRICAN AMERICAN): >60 ML/MIN/1.73 M^2
GLUCOSE SERPL-MCNC: 100 MG/DL (ref 70–110)
HCT VFR BLD AUTO: 41 % (ref 37–48.5)
HGB BLD-MCNC: 13.3 G/DL (ref 12–16)
IMM GRANULOCYTES # BLD AUTO: 0.06 K/UL (ref 0–0.04)
IMM GRANULOCYTES NFR BLD AUTO: 0.4 % (ref 0–0.5)
LIPASE SERPL-CCNC: 130 U/L (ref 4–60)
LYMPHOCYTES # BLD AUTO: 3.4 K/UL (ref 1–4.8)
LYMPHOCYTES NFR BLD: 22.5 % (ref 18–48)
MCH RBC QN AUTO: 30.6 PG (ref 27–31)
MCHC RBC AUTO-ENTMCNC: 32.4 G/DL (ref 32–36)
MCV RBC AUTO: 95 FL (ref 82–98)
MONOCYTES # BLD AUTO: 1 K/UL (ref 0.3–1)
MONOCYTES NFR BLD: 6.3 % (ref 4–15)
NEUTROPHILS # BLD AUTO: 10.3 K/UL (ref 1.8–7.7)
NEUTROPHILS NFR BLD: 67.9 % (ref 38–73)
NRBC BLD-RTO: 0 /100 WBC
PLATELET # BLD AUTO: 300 K/UL (ref 150–350)
PMV BLD AUTO: 9.4 FL (ref 9.2–12.9)
POTASSIUM SERPL-SCNC: 3 MMOL/L (ref 3.5–5.1)
PROT SERPL-MCNC: 7.6 G/DL (ref 6–8.4)
RBC # BLD AUTO: 4.34 M/UL (ref 4–5.4)
SODIUM SERPL-SCNC: 137 MMOL/L (ref 136–145)
WBC # BLD AUTO: 15.15 K/UL (ref 3.9–12.7)

## 2019-04-18 PROCEDURE — 74177 CT ABD & PELVIS W/CONTRAST: CPT | Mod: TC

## 2019-04-18 PROCEDURE — 96375 TX/PRO/DX INJ NEW DRUG ADDON: CPT

## 2019-04-18 PROCEDURE — 96374 THER/PROPH/DIAG INJ IV PUSH: CPT

## 2019-04-18 PROCEDURE — 74177 CT ABD & PELVIS W/CONTRAST: CPT | Mod: 26,,, | Performed by: RADIOLOGY

## 2019-04-18 PROCEDURE — 74177 CT ABDOMEN PELVIS WITH CONTRAST: ICD-10-PCS | Mod: 26,,, | Performed by: RADIOLOGY

## 2019-04-18 PROCEDURE — 83690 ASSAY OF LIPASE: CPT

## 2019-04-18 PROCEDURE — 80053 COMPREHEN METABOLIC PANEL: CPT

## 2019-04-18 PROCEDURE — 85025 COMPLETE CBC W/AUTO DIFF WBC: CPT

## 2019-04-18 PROCEDURE — 96361 HYDRATE IV INFUSION ADD-ON: CPT

## 2019-04-18 PROCEDURE — 25500020 PHARM REV CODE 255: Performed by: FAMILY MEDICINE

## 2019-04-18 PROCEDURE — 99285 EMERGENCY DEPT VISIT HI MDM: CPT | Mod: 25

## 2019-04-18 PROCEDURE — 63600175 PHARM REV CODE 636 W HCPCS: Performed by: FAMILY MEDICINE

## 2019-04-18 RX ORDER — ONDANSETRON 2 MG/ML
4 INJECTION INTRAMUSCULAR; INTRAVENOUS
Status: COMPLETED | OUTPATIENT
Start: 2019-04-18 | End: 2019-04-18

## 2019-04-18 RX ADMIN — SODIUM CHLORIDE 1000 ML: 9 INJECTION, SOLUTION INTRAVENOUS at 11:04

## 2019-04-18 RX ADMIN — IOHEXOL 100 ML: 350 INJECTION, SOLUTION INTRAVENOUS at 11:04

## 2019-04-18 RX ADMIN — ONDANSETRON 4 MG: 2 INJECTION INTRAMUSCULAR; INTRAVENOUS at 10:04

## 2019-04-19 VITALS
OXYGEN SATURATION: 94 % | TEMPERATURE: 96 F | SYSTOLIC BLOOD PRESSURE: 120 MMHG | RESPIRATION RATE: 17 BRPM | DIASTOLIC BLOOD PRESSURE: 58 MMHG | HEIGHT: 67 IN | WEIGHT: 186.5 LBS | BODY MASS INDEX: 29.27 KG/M2 | HEART RATE: 60 BPM

## 2019-04-19 PROBLEM — K85.90 ACUTE PANCREATITIS: Status: ACTIVE | Noted: 2019-04-19

## 2019-04-19 LAB
AMPHET+METHAMPHET UR QL: NEGATIVE
BARBITURATES UR QL SCN>200 NG/ML: NEGATIVE
BASOPHILS # BLD AUTO: 0.07 K/UL (ref 0–0.2)
BASOPHILS NFR BLD: 0.6 % (ref 0–1.9)
BENZODIAZ UR QL SCN>200 NG/ML: NEGATIVE
BILIRUB UR QL STRIP: NEGATIVE
BZE UR QL SCN: NEGATIVE
CANNABINOIDS UR QL SCN: NEGATIVE
CLARITY UR: CLEAR
COLOR UR: YELLOW
CREAT UR-MCNC: 79.4 MG/DL (ref 15–325)
DIFFERENTIAL METHOD: ABNORMAL
EOSINOPHIL # BLD AUTO: 0.3 K/UL (ref 0–0.5)
EOSINOPHIL NFR BLD: 2.1 % (ref 0–8)
ERYTHROCYTE [DISTWIDTH] IN BLOOD BY AUTOMATED COUNT: 14 % (ref 11.5–14.5)
GLUCOSE UR QL STRIP: NEGATIVE
HCT VFR BLD AUTO: 37.2 % (ref 37–48.5)
HGB BLD-MCNC: 11.8 G/DL (ref 12–16)
HGB UR QL STRIP: NEGATIVE
IMM GRANULOCYTES # BLD AUTO: 0.06 K/UL (ref 0–0.04)
IMM GRANULOCYTES NFR BLD AUTO: 0.5 % (ref 0–0.5)
KETONES UR QL STRIP: NEGATIVE
LEUKOCYTE ESTERASE UR QL STRIP: NEGATIVE
LIPASE SERPL-CCNC: 63 U/L (ref 4–60)
LYMPHOCYTES # BLD AUTO: 3.3 K/UL (ref 1–4.8)
LYMPHOCYTES NFR BLD: 25.8 % (ref 18–48)
MCH RBC QN AUTO: 29.9 PG (ref 27–31)
MCHC RBC AUTO-ENTMCNC: 31.7 G/DL (ref 32–36)
MCV RBC AUTO: 94 FL (ref 82–98)
MONOCYTES # BLD AUTO: 0.8 K/UL (ref 0.3–1)
MONOCYTES NFR BLD: 6.1 % (ref 4–15)
NEUTROPHILS # BLD AUTO: 8.2 K/UL (ref 1.8–7.7)
NEUTROPHILS NFR BLD: 64.9 % (ref 38–73)
NITRITE UR QL STRIP: NEGATIVE
NRBC BLD-RTO: 0 /100 WBC
OPIATES UR QL SCN: NORMAL
PCP UR QL SCN>25 NG/ML: NEGATIVE
PH UR STRIP: 6 [PH] (ref 5–8)
PLATELET # BLD AUTO: 280 K/UL (ref 150–350)
PMV BLD AUTO: 9.6 FL (ref 9.2–12.9)
PROT UR QL STRIP: NEGATIVE
RBC # BLD AUTO: 3.94 M/UL (ref 4–5.4)
SP GR UR STRIP: <=1.005 (ref 1–1.03)
TOXICOLOGY INFORMATION: NORMAL
URN SPEC COLLECT METH UR: NORMAL
UROBILINOGEN UR STRIP-ACNC: NEGATIVE EU/DL
WBC # BLD AUTO: 12.68 K/UL (ref 3.9–12.7)

## 2019-04-19 PROCEDURE — 85025 COMPLETE CBC W/AUTO DIFF WBC: CPT

## 2019-04-19 PROCEDURE — 83690 ASSAY OF LIPASE: CPT

## 2019-04-19 PROCEDURE — 63600175 PHARM REV CODE 636 W HCPCS: Performed by: INTERNAL MEDICINE

## 2019-04-19 PROCEDURE — 36415 COLL VENOUS BLD VENIPUNCTURE: CPT

## 2019-04-19 PROCEDURE — 63600175 PHARM REV CODE 636 W HCPCS

## 2019-04-19 PROCEDURE — S5010 5% DEXTROSE AND 0.45% SALINE: HCPCS | Performed by: FAMILY MEDICINE

## 2019-04-19 PROCEDURE — S4991 NICOTINE PATCH NONLEGEND: HCPCS

## 2019-04-19 PROCEDURE — 63600175 PHARM REV CODE 636 W HCPCS: Performed by: FAMILY MEDICINE

## 2019-04-19 PROCEDURE — 96376 TX/PRO/DX INJ SAME DRUG ADON: CPT

## 2019-04-19 PROCEDURE — 96366 THER/PROPH/DIAG IV INF ADDON: CPT

## 2019-04-19 PROCEDURE — G0378 HOSPITAL OBSERVATION PER HR: HCPCS

## 2019-04-19 PROCEDURE — 96375 TX/PRO/DX INJ NEW DRUG ADDON: CPT

## 2019-04-19 PROCEDURE — 81003 URINALYSIS AUTO W/O SCOPE: CPT | Mod: 59

## 2019-04-19 PROCEDURE — 96365 THER/PROPH/DIAG IV INF INIT: CPT

## 2019-04-19 PROCEDURE — 25000003 PHARM REV CODE 250: Performed by: INTERNAL MEDICINE

## 2019-04-19 PROCEDURE — 25000003 PHARM REV CODE 250: Performed by: FAMILY MEDICINE

## 2019-04-19 PROCEDURE — 25000003 PHARM REV CODE 250

## 2019-04-19 PROCEDURE — 80307 DRUG TEST PRSMV CHEM ANLYZR: CPT

## 2019-04-19 RX ORDER — ONDANSETRON 2 MG/ML
INJECTION INTRAMUSCULAR; INTRAVENOUS
Status: DISCONTINUED
Start: 2019-04-19 | End: 2019-04-19 | Stop reason: HOSPADM

## 2019-04-19 RX ORDER — HYDROMORPHONE HYDROCHLORIDE 2 MG/ML
INJECTION, SOLUTION INTRAMUSCULAR; INTRAVENOUS; SUBCUTANEOUS
Status: COMPLETED
Start: 2019-04-19 | End: 2019-04-19

## 2019-04-19 RX ORDER — CLOPIDOGREL BISULFATE 75 MG/1
75 TABLET ORAL DAILY
Status: DISCONTINUED | OUTPATIENT
Start: 2019-04-19 | End: 2019-04-19 | Stop reason: HOSPADM

## 2019-04-19 RX ORDER — AMLODIPINE BESYLATE 2.5 MG/1
5 TABLET ORAL DAILY
Status: DISCONTINUED | OUTPATIENT
Start: 2019-04-19 | End: 2019-04-19 | Stop reason: HOSPADM

## 2019-04-19 RX ORDER — ESCITALOPRAM OXALATE 10 MG/1
20 TABLET ORAL DAILY
Status: DISCONTINUED | OUTPATIENT
Start: 2019-04-19 | End: 2019-04-19 | Stop reason: HOSPADM

## 2019-04-19 RX ORDER — ONDANSETRON 2 MG/ML
4 INJECTION INTRAMUSCULAR; INTRAVENOUS EVERY 8 HOURS PRN
Status: DISCONTINUED | OUTPATIENT
Start: 2019-04-19 | End: 2019-04-19

## 2019-04-19 RX ORDER — ONDANSETRON 2 MG/ML
4 INJECTION INTRAMUSCULAR; INTRAVENOUS EVERY 8 HOURS PRN
Status: DISCONTINUED | OUTPATIENT
Start: 2019-04-19 | End: 2019-04-19 | Stop reason: HOSPADM

## 2019-04-19 RX ORDER — DEXTROSE MONOHYDRATE AND SODIUM CHLORIDE 5; .45 G/100ML; G/100ML
INJECTION, SOLUTION INTRAVENOUS CONTINUOUS
Status: DISCONTINUED | OUTPATIENT
Start: 2019-04-19 | End: 2019-04-19 | Stop reason: HOSPADM

## 2019-04-19 RX ORDER — HYDROMORPHONE HYDROCHLORIDE 2 MG/ML
0.5 INJECTION, SOLUTION INTRAMUSCULAR; INTRAVENOUS; SUBCUTANEOUS EVERY 4 HOURS PRN
Status: DISCONTINUED | OUTPATIENT
Start: 2019-04-19 | End: 2019-04-19

## 2019-04-19 RX ORDER — POTASSIUM CHLORIDE 20 MEQ/1
20 TABLET, EXTENDED RELEASE ORAL DAILY
Status: DISCONTINUED | OUTPATIENT
Start: 2019-04-19 | End: 2019-04-19

## 2019-04-19 RX ORDER — LEVOTHYROXINE SODIUM 25 UG/1
50 TABLET ORAL DAILY
Status: DISCONTINUED | OUTPATIENT
Start: 2019-04-19 | End: 2019-04-19 | Stop reason: HOSPADM

## 2019-04-19 RX ORDER — TRAZODONE HYDROCHLORIDE 50 MG/1
TABLET ORAL
Status: DISCONTINUED
Start: 2019-04-19 | End: 2019-04-19 | Stop reason: HOSPADM

## 2019-04-19 RX ORDER — TRAZODONE HYDROCHLORIDE 50 MG/1
100 TABLET ORAL NIGHTLY
Status: DISCONTINUED | OUTPATIENT
Start: 2019-04-19 | End: 2019-04-19 | Stop reason: HOSPADM

## 2019-04-19 RX ORDER — HYDROMORPHONE HYDROCHLORIDE 2 MG/ML
INJECTION, SOLUTION INTRAMUSCULAR; INTRAVENOUS; SUBCUTANEOUS
Status: DISCONTINUED
Start: 2019-04-19 | End: 2019-04-19 | Stop reason: HOSPADM

## 2019-04-19 RX ORDER — POTASSIUM CHLORIDE 20 MEQ/1
20 TABLET, EXTENDED RELEASE ORAL 2 TIMES DAILY
Status: DISCONTINUED | OUTPATIENT
Start: 2019-04-19 | End: 2019-04-19 | Stop reason: HOSPADM

## 2019-04-19 RX ORDER — IBUPROFEN 200 MG
1 TABLET ORAL DAILY
Status: DISCONTINUED | OUTPATIENT
Start: 2019-04-19 | End: 2019-04-19 | Stop reason: HOSPADM

## 2019-04-19 RX ORDER — POTASSIUM CHLORIDE 7.45 MG/ML
10 INJECTION INTRAVENOUS ONCE
Status: COMPLETED | OUTPATIENT
Start: 2019-04-19 | End: 2019-04-19

## 2019-04-19 RX ORDER — IBUPROFEN 200 MG
TABLET ORAL
Status: COMPLETED
Start: 2019-04-19 | End: 2019-04-19

## 2019-04-19 RX ORDER — METOPROLOL TARTRATE 25 MG/1
100 TABLET, FILM COATED ORAL 2 TIMES DAILY
Status: DISCONTINUED | OUTPATIENT
Start: 2019-04-19 | End: 2019-04-19 | Stop reason: HOSPADM

## 2019-04-19 RX ORDER — HYDROMORPHONE HYDROCHLORIDE 2 MG/ML
0.5 INJECTION, SOLUTION INTRAMUSCULAR; INTRAVENOUS; SUBCUTANEOUS EVERY 4 HOURS PRN
Status: DISCONTINUED | OUTPATIENT
Start: 2019-04-19 | End: 2019-04-19 | Stop reason: HOSPADM

## 2019-04-19 RX ADMIN — HYDROMORPHONE HYDROCHLORIDE 0.5 MG: 2 INJECTION, SOLUTION INTRAMUSCULAR; INTRAVENOUS; SUBCUTANEOUS at 01:04

## 2019-04-19 RX ADMIN — DEXTROSE AND SODIUM CHLORIDE: 5; 450 INJECTION, SOLUTION INTRAVENOUS at 05:04

## 2019-04-19 RX ADMIN — HYDROMORPHONE HYDROCHLORIDE 0.5 MG: 2 INJECTION, SOLUTION INTRAMUSCULAR; INTRAVENOUS; SUBCUTANEOUS at 05:04

## 2019-04-19 RX ADMIN — CLOPIDOGREL BISULFATE 75 MG: 75 TABLET ORAL at 08:04

## 2019-04-19 RX ADMIN — METOPROLOL TARTRATE 100 MG: 25 TABLET ORAL at 08:04

## 2019-04-19 RX ADMIN — ONDANSETRON 4 MG: 2 INJECTION INTRAMUSCULAR; INTRAVENOUS at 01:04

## 2019-04-19 RX ADMIN — AMLODIPINE BESYLATE 5 MG: 2.5 TABLET ORAL at 08:04

## 2019-04-19 RX ADMIN — TRAZODONE HYDROCHLORIDE 100 MG: 50 TABLET ORAL at 05:04

## 2019-04-19 RX ADMIN — POTASSIUM CHLORIDE 10 MEQ: 7.46 INJECTION, SOLUTION INTRAVENOUS at 08:04

## 2019-04-19 RX ADMIN — HYDROMORPHONE HYDROCHLORIDE 0.5 MG: 2 INJECTION, SOLUTION INTRAMUSCULAR; INTRAVENOUS; SUBCUTANEOUS at 10:04

## 2019-04-19 RX ADMIN — ESCITALOPRAM OXALATE 20 MG: 10 TABLET ORAL at 08:04

## 2019-04-19 RX ADMIN — LEVOTHYROXINE SODIUM 50 MCG: 25 TABLET ORAL at 08:04

## 2019-04-19 RX ADMIN — NICOTINE 1 PATCH: 21 PATCH, EXTENDED RELEASE TRANSDERMAL at 05:04

## 2019-04-19 RX ADMIN — POTASSIUM CHLORIDE 20 MEQ: 1500 TABLET, EXTENDED RELEASE ORAL at 08:04

## 2019-04-19 NOTE — ED NOTES
"Pt ambulated out of ER, refused to change out of gown, took her glass of water with her and states "I will be back".  "

## 2019-04-19 NOTE — DISCHARGE SUMMARY
[facility] Medicine  Discharge Summary      Patient Name: Alessia Nelson  MRN: [id number]  Admission Date: 4/18/2019  Hospital Length of Stay: 0 days  Discharge Date and Time:  04/19/2019 8:48 AM  Attending Physician: Aren Loving MD   Discharging Provider: Aren Loving MD  Primary Care Provider: Lynne Parsons NP      HPI:   Patient 63-year-old female who presented to the emergency department complaining of mid abdominal pain that started approximately 2-3 weeks ago and has worsened over the last several days.  Patient was recently seen in the emergency department for a fall and lower back pain and this was not mentioned at this visit.  Patient complains of nausea and vomiting and mid epigastric abdominal pain with radiation to the back.  Patient has a past medical history significant for CHF, COPD, hypertension, gastroesophageal reflux disease, and thyroid disease. Patient further denies any fever or chills associated with this.  Patient was very noncompliant during her visit and pulled her IV out and when out to smoke against medical advice.  Patient then came back in and stated she was ready to be admitted then.  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  This was all related by emergency department physician last night, Dr. Mathur    * No surgery found *      Hospital Course:   04/19/2019:  Patient is comfortable this morning in no acute distress and abdominal pain has resolved.  Lipase has decreased from 130-68 and all other labs were normal except for potassium which was low last evening at 3.0 however this has been replaced in the ER and this morning on medication administration.  It was determined this patient is stable for discharge and will be followed by her primary care physician within 1-2 days post discharge. Patient was counseled regarding her diagnosis and was advised to have  clear liquids over the next 1-2 days and a bland diet for the next 4-5 days.  Patient will be discharged home in stable condition for continued treatment at home and follow up with her primary care physician early next week     Consults:   Consults (From admission, onward)        Status Ordering Provider     IP consult to case management  Once     Provider:  (Not yet assigned)    Acknowledged QUINTIN GUZMAN     IP consult to case management  Once     Provider:  (Not yet assigned)    Acknowledged QUINTIN GUZMAN          * Acute pancreatitis  Admit for observation for abdominal pain and findings by CT of mild or early pancreatitis.  2.  Continue NPO status  3.  Pain medication as needed  4.  If lipase is trending downward patient may be discharged home with bowel rest and bland diet and follow with primary care physician.  Will evaluate labs when they are returned.  Labs are pending at this time    04/19/2019:  1.  Continue current medications at home  2.  Discharged to home in stable condition  3.  Clear liquids then bland diet for the next 3-4 days.  4.  Continue home medications  5.  Follow-up with primary care physician on Monday or Tuesday of next week for recheck        Final Active Diagnoses:    Diagnosis Date Noted POA    PRINCIPAL PROBLEM:  Acute pancreatitis [K85.90] 04/19/2019 Yes      Problems Resolved During this Admission:       Discharged Condition: good    Disposition: Home or Self Care    Follow Up:    Patient Instructions:   No discharge procedures on file.    Significant Diagnostic Studies: Labs: All labs within the past 24 hours have been reviewed    Pending Diagnostic Studies:     Procedure Component Value Units Date/Time    CT Abdomen Pelvis With Contrast [[id number]] Resulted:  04/19/19 0808    Order Status:  Sent Lab Status:  In process Updated:  04/18/19 0616         Medications:  Reconciled Home Medications:      Medication List      CHANGE how you take these medications    clopidogrel 75  mg tablet  Commonly known as:  PLAVIX  Take 1 tablet (75 mg total) by mouth once daily.  What changed:  Another medication with the same name was removed. Continue taking this medication, and follow the directions you see here.        CONTINUE taking these medications    acetaminophen 325 MG tablet  Commonly known as:  TYLENOL  Take 2 tablets (650 mg total) by mouth every 6 (six) hours as needed (or equal to 101 degree F).     allopurinol 300 MG tablet  Commonly known as:  ZYLOPRIM     amLODIPine 5 MG tablet  Commonly known as:  NORVASC  Take 5 mg by mouth once daily.     colestipol 1 gram Tab  Commonly known as:  COLESTID     cyanocobalamin 1,000 mcg/mL injection  INJECT 1 ML INTO THE MUSCLE Q OTHER WEEK.     escitalopram oxalate 20 MG tablet  Commonly known as:  LEXAPRO  Take 20 mg by mouth once daily.     gabapentin 800 MG tablet  Commonly known as:  NEURONTIN  Take 400 mg by mouth 4 (four) times daily.     glycopyrrolate 1 mg Tab  Commonly known as:  ROBINUL  TK 1 T PO BID     HYDROcodone-acetaminophen 7.5-325 mg per tablet  Commonly known as:  NORCO  TK 1 T PO  BID PRN PAIN     ipratropium-albuterol  mcg/actuation inhaler  Commonly known as:  COMBIVENT  Inhale 1 puff into the lungs 4 (four) times daily. Rescue     levothyroxine 50 MCG tablet  Commonly known as:  SYNTHROID  Take 50 mcg by mouth once daily.     lisinopril 40 MG tablet  Commonly known as:  PRINIVIL,ZESTRIL  Take 0.5 tablets (20 mg total) by mouth daily with dinner or evening meal.     metoprolol tartrate 100 MG tablet  Commonly known as:  LOPRESSOR  Take 1 tablet (100 mg total) by mouth 2 (two) times daily.     NIFEdipine 30 MG (OSM) 24 hr tablet  Commonly known as:  PROCARDIA-XL  Take 1 tablet (30 mg total) by mouth once daily.     ondansetron 4 MG tablet  Commonly known as:  ZOFRAN  Take 8 mg by mouth every 8 (eight) hours.     pantoprazole 40 MG tablet  Commonly known as:  PROTONIX  Take 40 mg by mouth once daily.     potassium chloride  SA 20 MEQ tablet  Commonly known as:  K-DUR,KLOR-CON  Take 1 tablet (20 mEq total) by mouth once daily.     traZODone 100 MG tablet  Commonly known as:  DESYREL  Take 1 tablet (100 mg total) by mouth every evening.        STOP taking these medications    amitriptyline 100 MG tablet  Commonly known as:  ELAVIL     cloNIDine 0.3 mg/24 hr td ptwk 0.3 mg/24 hr  Commonly known as:  CATAPRES     levoFLOXacin 500 MG tablet  Commonly known as:  LEVAQUIN     magnesium oxide 400 mg (241.3 mg magnesium) tablet  Commonly known as:  MAG-OX            Indwelling Lines/Drains at time of discharge:   Lines/Drains/Airways          None          Time spent on the discharge of patient: 40 minutes  Patient was seen and examined on the date of discharge and determined to be suitable for discharge.         Aren Loving MD  Department of Hospital Medicine  [facility]

## 2019-04-19 NOTE — ED NOTES
"Pt refusing to sign AMA papers, states " I just want to go outside, get my stuff, smoke one cigarette then come back to be admitted."  Dr Mathur at bedside along with TIMMY Herrera RN and NALINI Parikh RN. Pt threatening staff with legal action if we do no treat her and if we don't let her go outside to smoke". Policy explained. Pt arguing about NPO status. Carrying a glass of water, states " I've been in the hospital before and they always let me drink". Pt explained NPO status again.  "

## 2019-04-19 NOTE — ED NOTES
"Pt ambulated back into ER. States " I ready to be admitted, requested pain medication, nausea medication and nicotine patch". Dr Mathur aware.  "

## 2019-04-19 NOTE — ASSESSMENT & PLAN NOTE
Admit for observation for abdominal pain and findings by CT of mild or early pancreatitis.  2.  Continue NPO status  3.  Pain medication as needed  4.  If lipase is trending downward patient may be discharged home with bowel rest and bland diet and follow with primary care physician.  Will evaluate labs when they are returned.  Labs are pending at this time    04/19/2019:  1.  Continue current medications at home  2.  Discharged to home in stable condition  3.  Clear liquids then bland diet for the next 3-4 days.  4.  Continue home medications  5.  Follow-up with primary care physician on Monday or Tuesday of next week for recheck

## 2019-04-19 NOTE — ED PROVIDER NOTES
"Encounter Date: 4/18/2019       History     Chief Complaint   Patient presents with    Abdominal Pain     x few weeks-hx of hernia and pancreatitis    Nausea     Pt presents to ED with complaints of mid abdominal pain for several weeks, worse for the past several days. Denies any fever, chills, or dysuria. Pt states she was seen by PM doctor today, took dose of Hydrocodone earlier today with no relief. States she has a long history of chronic pain, abdominal surgeries. States "I won't be able to go home because I am in so much pain". Pt states she needs pain relief immediately and that "I prefer Demerol". Pt advised that workup will be needed and that I typically do not give demerol due to it lowering seizure threshold, she verbalizes understanding.         Review of patient's allergies indicates:   Allergen Reactions    Aspirin     Lortab [hydrocodone-acetaminophen] Itching    Phenergan [promethazine]     Promethazine hcl      Past Medical History:   Diagnosis Date    CHF (congestive heart failure)     COPD (chronic obstructive pulmonary disease)     Depression     Hypertension     Thyroid disease      Past Surgical History:   Procedure Laterality Date    ADRENAL GLAND SURGERY      CHOLECYSTECTOMY      HERNIA REPAIR      HYSTERECTOMY      instestine      PARATHYROID GLAND SURGERY      3 surgeries     Family History   Problem Relation Age of Onset    Stroke Maternal Grandmother     Cancer Maternal Grandfather     Stroke Mother     Heart disease Father      Social History     Tobacco Use    Smoking status: Current Every Day Smoker     Packs/day: 1.00     Years: 15.00     Pack years: 15.00   Substance Use Topics    Alcohol use: No    Drug use: No     Review of Systems   Constitutional: Negative for chills and fever.   HENT: Negative.    Respiratory: Negative.    Gastrointestinal: Positive for abdominal pain. Negative for nausea and vomiting.   Genitourinary: Negative.        Physical Exam "     Initial Vitals   BP Pulse Resp Temp SpO2   04/18/19 2136 04/18/19 2136 04/18/19 2136 04/18/19 2142 04/18/19 2136   (!) 166/88 86 20 98.4 °F (36.9 °C) 98 %      MAP       --                Physical Exam    Nursing note and vitals reviewed.  Constitutional: She appears well-developed and well-nourished. She is not diaphoretic. No distress.   HENT:   Head: Normocephalic and atraumatic.   Eyes: Conjunctivae and EOM are normal. Pupils are equal, round, and reactive to light.   Neck: Normal range of motion. Neck supple.   Cardiovascular: Normal rate, regular rhythm, normal heart sounds and intact distal pulses. Exam reveals no gallop and no friction rub.    No murmur heard.  Pulmonary/Chest: Breath sounds normal. No respiratory distress. She has no wheezes. She has no rales.   Abdominal: Soft. Bowel sounds are normal. She exhibits no distension. There is tenderness. There is no rebound and no guarding.   Abdominal exam normal except for complaints of tenderness when palpated superior to umbilicus. No guarding or rebound. Multiple scars. No palpable hernia.    Musculoskeletal: Normal range of motion. She exhibits no edema.   Neurological: She is alert and oriented to person, place, and time. She has normal strength.   Skin: Skin is warm and dry. Capillary refill takes less than 2 seconds. No rash noted. No erythema.   Psychiatric: She has a normal mood and affect. Her behavior is normal. Judgment and thought content normal.         ED Course   Procedures  Labs Reviewed   CBC W/ AUTO DIFFERENTIAL   COMPREHENSIVE METABOLIC PANEL   LIPASE   URINALYSIS, REFLEX TO URINE CULTURE   DRUG SCREEN PANEL, URINE EMERGENCY         Labs Reviewed   CBC W/ AUTO DIFFERENTIAL - Abnormal; Notable for the following components:       Result Value    WBC 15.15 (*)     Gran # (ANC) 10.3 (*)     Immature Grans (Abs) 0.06 (*)     All other components within normal limits   COMPREHENSIVE METABOLIC PANEL - Abnormal; Notable for the following  components:    Potassium 3.0 (*)     CO2 19 (*)     All other components within normal limits   LIPASE - Abnormal; Notable for the following components:    Lipase 130 (*)     All other components within normal limits   URINALYSIS, REFLEX TO URINE CULTURE    Narrative:     Preferred Collection Type->Urine, Clean Catch   DRUG SCREEN PANEL, URINE EMERGENCY    Narrative:     Preferred Collection Type->Urine, Clean Catch       Imaging Results    None                            ED Course as of Apr 19 0121   Thu Apr 18, 2019   2347 Chloride: 107 [MD]   Fri Apr 19, 2019   0005 CT abdomen/pelvis:  ABDOMEN:  Liver: Normal. No mass.  Gallbladder and bile ducts: Cholecystectomy.  Pancreas: minimal/subtle haziness of peripancreatic fat is new.  Spleen: Normal. No splenomegaly.  Adrenals: Normal. No mass.  Kidneys and ureters: Stable small right renal lesion suggesting cyst. No hydronephrosis.  Stomach and bowel: Multiple colonic lipomas noted and without significant change.  Appendix: No evidence of appendicitis.  PELVIS:  Bladder: Unremarkable as visualized.  Reproductive: Hysterectomy.  ABDOMEN and PELVIS:  Intraperitoneal space: Normal. No free air. No significant fluid collection.  Bones/joints: Hardware in place at lower lumbar spine.  Soft tissues: Fatty midline ventral hernia. Small fatty lumbar hernia.  Vasculature: Early or mild aneurysmal change in the infrarenal aorta up to 2.5 cm.  Lymph nodes: Normal. No enlarged lymph nodes.  IMPRESSION:  Findings suspicious for some mild or early pancreatitis. Mild new aneurysmal change suggested in  the infrarenal aorta measuring up to 2.5 cm. Additional details as above.    [MD]   0011 Pt with mild early pancreatitis, advised admission. She is adamant that she be allowed to go outside and smoke. I advised pt that she could not do this. She states she will do it anyway. Refuses nicotine patch.     [MD]   0014 Pt states she is walking out to her car to smoke, asking for pain meds  "first. Advised pt she cannot be given sedating pain meds and then allowed to ambulate alone, she states she will walk outside anyway.    [MD]   0015 Awaiting results of UA prior to calling hospitalist for admission.     [MD]   0035 Pt still demanding to walk outside and smoke. States she will not sign AMA. I told her I will not discharge her as she needs admission, she states she is going outside no matter what. I explained that if she leaves the ED she will be leaving against my advice no matter whether she signs the paper or not. I advised her that I will gladly provide nicotine patch, she again refuses.     [MD]   0038 I again offered pain meds but not if she continues to demand to walk outside as this is not safe in my opinion. She states "yall are refusing to treat me". I explained that in NO way are we refusing to treat her but that I am strongly recommending admission, she is being noncompliant with my recommendations.     [MD]   0048 Pt tried to walk out with IV in, advised pt multiple times that she cannot leave with Iv. She is ADAMANT that she is leaving no matter what.   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 Just saying."  Pt stated "just wait until Monday comes and I make my phone calls, yall will wish you had let me smoke". Pt made several overtly threatening statements similar to this prior to walking out.   She refused to sign ama.    [MD]   0049 Pt left ED in stable condition, drinking fluids against both mine and the nurses advice.     [MD]   0103 Pt returned to the ED several minutes later, states she is ready to be admitted now.       [MD]   0106 Dr. Loving called and case discussed with him. He agrees to admission.     [MD]      ED Course User Index  [MD] Dawna Mathur MD     Clinical Impression:       ICD-10-CM ICD-9-CM   1. Acute pancreatitis, unspecified complication status, unspecified pancreatitis " type K85.90 577.0   2. Cigarette nicotine dependence without complication F17.210 305.1                                Dawna Mathur MD  04/19/19 0051       Dawna Mathur MD  04/19/19 0123

## 2019-04-19 NOTE — NURSING
Patient removing yellow safety socks repeatedly and leaving socks off with use of ELENI hose. Stressed to patient to always have gripper socks/yellow socks on with TEDS due to high risk for falls.Socks placed back on patient. Bed low and locked. Bed alarm active -level 2. Call light in reach.

## 2019-04-19 NOTE — HPI
Patient 63-year-old female who presented to the emergency department complaining of mid abdominal pain that started approximately 2-3 weeks ago and has worsened over the last several days.  Patient was recently seen in the emergency department for a fall and lower back pain and this was not mentioned at this visit.  Patient complains of nausea and vomiting and mid epigastric abdominal pain with radiation to the back.  Patient has a past medical history significant for CHF, COPD, hypertension, gastroesophageal reflux disease, and thyroid disease. Patient further denies any fever or chills associated with this.  Patient was very noncompliant during her visit and pulled her IV out and when out to smoke against medical advice.  Patient then came back in and stated she was ready to be admitted then.  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  This was all related by emergency department physician last night, Dr. Mathur

## 2019-04-19 NOTE — ED NOTES
Pt here with complaints of abdominal pain and nausea x few weeks. Pt reports hx of hernia repair and pancreatitis. Pt awake and alert. Respirations non labored. Skin warm and dry. Reports couldn't get appt with PCP until May.

## 2019-04-19 NOTE — NURSING
Patient ready for discharge instructions at this time. Patient educated on pancreatitis care at home. Patient also educated on bland diet and which foods to avoid. Patient was provided with printed handouts on education. Patient verbalized understanding. IV catheter removed and intact with pressure dressing applied. Telemetry removed. Nicotine patch removed.     Patient verbalized an understanding of discharge teaching and stated she had no other questions or needs at this time. Patient was awaiting for her transportation at this time.

## 2019-04-19 NOTE — NURSING
Patient alerted nurse that transportation had arrived. Patient was given her locked up home medications from the safe. Patient refused wheelchair and ambulated out of facility to POV with nursing staff at side. NATALIE

## 2019-04-19 NOTE — PLAN OF CARE
Problem: Fall Injury Risk  Goal: Absence of Fall and Fall-Related Injury    Intervention: Promote Injury-Free Environment     04/19/19 0439   Optimize [place] and Functional Mobility   Environmental Safety Modification assistive device/personal items within reach;lighting adjusted;clutter free environment maintained   Optimize Balance and Safe Activity   Safety Promotion/Fall Prevention Fall Risk reviewed with patient/family;Fall Risk signage in place;lighting adjusted;medications reviewed;supervised activity;side rails raised x 2

## 2019-04-19 NOTE — H&P
[facility] Medicine  History & Physical    Patient Name: Alessia Nelson  MRN: [id number]  Admission Date: 4/18/2019  Attending Physician: Aren Loving MD  Primary Care Provider: Lynne Parsons NP         Patient information was obtained from patient and ER records.     Subjective:     Principal Problem:Acute pancreatitis    Chief Complaint:   Chief Complaint   Patient presents with    Abdominal Pain     x few weeks-hx of hernia and pancreatitis    Nausea        HPI: Patient 63-year-old female who presented to the emergency department complaining of mid abdominal pain that started approximately 2-3 weeks ago and has worsened over the last several days.  Patient was recently seen in the emergency department for a fall and lower back pain and this was not mentioned at this visit.  Patient complains of nausea and vomiting and mid epigastric abdominal pain with radiation to the back.  Patient has a past medical history significant for CHF, COPD, hypertension, gastroesophageal reflux disease, and thyroid disease. Patient further denies any fever or chills associated with this.  Patient was very noncompliant during her visit and pulled her IV out and when out to smoke against medical advice.  Patient then came back in and stated she was ready to be admitted then.  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  This was all related by emergency department physician last night, Dr. Mathur    Past Medical History:   Diagnosis Date    CHF (congestive heart failure)     COPD (chronic obstructive pulmonary disease)     Depression     Hypertension     Thyroid disease        Past Surgical History:   Procedure Laterality Date    ADRENAL GLAND SURGERY      CHOLECYSTECTOMY      HERNIA REPAIR      HYSTERECTOMY      instestine      PARATHYROID GLAND SURGERY      3 surgeries       Review of patient's allergies  indicates:   Allergen Reactions    Aspirin     Lortab [hydrocodone-acetaminophen] Itching    Phenergan [promethazine]     Promethazine hcl        No current facility-administered medications on file prior to encounter.      Current Outpatient Medications on File Prior to Encounter   Medication Sig    acetaminophen (TYLENOL) 325 MG tablet Take 2 tablets (650 mg total) by mouth every 6 (six) hours as needed (or equal to 101 degree F).    allopurinol (ZYLOPRIM) 300 MG tablet     amlodipine (NORVASC) 5 MG tablet Take 5 mg by mouth once daily.    clopidogrel (PLAVIX) 75 mg tablet Take 1 tablet (75 mg total) by mouth once daily.    colestipol (COLESTID) 1 gram Tab     cyanocobalamin 1,000 mcg/mL injection INJECT 1 ML INTO THE MUSCLE Q OTHER WEEK.    escitalopram (LEXAPRO) 20 MG tablet Take 20 mg by mouth once daily.    gabapentin (NEURONTIN) 800 MG tablet Take 400 mg by mouth 4 (four) times daily.     glycopyrrolate (ROBINUL) 1 mg Tab TK 1 T PO BID    HYDROcodone-acetaminophen (NORCO) 7.5-325 mg per tablet TK 1 T PO  BID PRN PAIN    ipratropium-albuterol (COMBIVENT)  mcg/actuation inhaler Inhale 1 puff into the lungs 4 (four) times daily. Rescue    levothyroxine (SYNTHROID) 50 MCG tablet Take 50 mcg by mouth once daily.    metoprolol tartrate (LOPRESSOR) 100 MG tablet Take 1 tablet (100 mg total) by mouth 2 (two) times daily.    ondansetron (ZOFRAN) 4 MG tablet Take 8 mg by mouth every 8 (eight) hours.    pantoprazole (PROTONIX) 40 MG tablet Take 40 mg by mouth once daily.    potassium chloride SA (K-DUR,KLOR-CON) 20 MEQ tablet Take 1 tablet (20 mEq total) by mouth once daily.    traZODone (DESYREL) 100 MG tablet Take 1 tablet (100 mg total) by mouth every evening.    clopidogrel (PLAVIX) 75 mg tablet Take 1 tablet (75 mg total) by mouth once daily.    lisinopril (PRINIVIL,ZESTRIL) 40 MG tablet Take 0.5 tablets (20 mg total) by mouth daily with dinner or evening meal.    nifedipine 30 MG ORAL  TR24 (PROCARDIA-XL) 30 MG (OSM) 24 hr tablet Take 1 tablet (30 mg total) by mouth once daily.     Family History     Problem Relation (Age of Onset)    Cancer Maternal Grandfather    Heart disease Father    Stroke Maternal Grandmother, Mother        Tobacco Use    Smoking status: Current Every Day Smoker     Packs/day: 1.00     Years: 15.00     Pack years: 15.00   Substance and Sexual Activity    Alcohol use: No    Drug use: No    Sexual activity: Not Currently     Review of Systems   Constitutional: Positive for fatigue. Negative for activity change, appetite change and fever.   HENT: Negative for congestion, ear discharge, mouth sores, nosebleeds, rhinorrhea, sinus pressure, sinus pain and tinnitus.    Eyes: Negative.  Negative for pain, redness and itching.   Respiratory: Negative for apnea, cough, choking, chest tightness, shortness of breath, wheezing and stridor.    Cardiovascular: Negative for chest pain, palpitations and leg swelling.   Gastrointestinal: Positive for abdominal pain, nausea and vomiting. Negative for abdominal distention, anal bleeding, blood in stool, constipation and diarrhea.   Endocrine: Negative.    Genitourinary: Negative for difficulty urinating, flank pain, frequency and urgency.   Musculoskeletal: Negative for arthralgias, back pain, gait problem and myalgias.   Skin: Negative for color change and pallor.   Allergic/Immunologic: Negative.    Neurological: Negative for dizziness, facial asymmetry, weakness, light-headedness and headaches.   Hematological: Negative for adenopathy. Does not bruise/bleed easily.   Psychiatric/Behavioral: Positive for behavioral problems. The patient is nervous/anxious.      Objective:     Vital Signs (Most Recent):  Temp: 98.1 °F (36.7 °C) (04/19/19 0256)  Pulse: (!) 57 (04/19/19 0256)  Resp: 18 (04/19/19 0256)  BP: (!) 112/56 (04/19/19 0256)  SpO2: 95 % (04/19/19 0256) Vital Signs (24h Range):  Temp:  [98.1 °F (36.7 °C)-98.4 °F (36.9 °C)] 98.1 °F  (36.7 °C)  Pulse:  [57-86] 57  Resp:  [18-20] 18  SpO2:  [95 %-98 %] 95 %  BP: (112-166)/(56-88) 112/56     Weight: 84.6 kg (186 lb 8.2 oz)  Body mass index is 29.21 kg/m².    Physical Exam   Constitutional: She is oriented to person, place, and time. She appears well-developed and well-nourished.   HENT:   Head: Normocephalic and atraumatic.   Eyes: Pupils are equal, round, and reactive to light. EOM are normal.   Neck: Normal range of motion. Neck supple. No tracheal deviation present. No thyromegaly present.   Cardiovascular: Normal rate, regular rhythm and normal heart sounds.   Pulmonary/Chest: Effort normal and breath sounds normal.   Abdominal: Soft. Bowel sounds are normal. She exhibits no distension. There is no tenderness. There is no rebound and no guarding.   Musculoskeletal: Normal range of motion.   Lymphadenopathy:     She has no cervical adenopathy.   Neurological: She is alert and oriented to person, place, and time.   Skin: Skin is warm and dry. Capillary refill takes less than 2 seconds.   Psychiatric: She has a normal mood and affect. Her behavior is normal. Judgment and thought content normal.   Nursing note and vitals reviewed.        CRANIAL NERVES     CN III, IV, VI   Pupils are equal, round, and reactive to light.  Extraocular motions are normal.        Significant Labs:   Recent Lab Results       04/19/19  0534   04/19/19  0033   04/18/19  2231        Benzodiazepines   Negative       Phencyclidine   Negative       Albumin     4.4     Alkaline Phosphatase     92     ALT     14     Amphetamine Screen, Ur   Negative       Anion Gap     11     Appearance, UA   Clear       AST     17     Barbiturate Screen, Ur   Negative       Baso # 0.07   0.07     Basophil% 0.6   0.5     Bilirubin (UA)   Negative       BILIRUBIN TOTAL     0.4  Comment:  For infants and newborns, interpretation of results should be based  on gestational age, weight and in agreement with clinical  observations.  Premature Infant  recommended reference ranges:  Up to 24 hours.............<8.0 mg/dL  Up to 48 hours............<12.0 mg/dL  3-5 days..................<15.0 mg/dL  6-29 days.................<15.0 mg/dL       BUN, Bld     13     Calcium     9.8     Chloride     107     CO2     19     Cocaine (Metab.)   Negative       Color, UA   Yellow       Creatinine     0.8     Creatinine, Random Ur   79.4  Comment:  The random urine reference ranges provided were established   for 24 hour urine collections.  No reference ranges exist for  random urine specimens.  Correlate clinically.         Differential Method Automated   Automated     eGFR if      >60.0     eGFR if non      >60.0  Comment:  Calculation used to obtain the estimated glomerular filtration  rate (eGFR) is the CKD-EPI equation.        Eos # 0.3   0.4     Eosinophil% 2.1   2.4     Glucose     100     Glucose, UA   Negative       Gran # (ANC) 8.2   10.3     Gran% 64.9   67.9     Hematocrit 37.2   41.0     Hemoglobin 11.8   13.3     Immature Grans (Abs) 0.06  Comment:  Mild elevation in immature granulocytes is non specific and   can be seen in a variety of conditions including stress response,   acute inflammation, trauma and pregnancy. Correlation with other   laboratory and clinical findings is essential.     0.06  Comment:  Mild elevation in immature granulocytes is non specific and   can be seen in a variety of conditions including stress response,   acute inflammation, trauma and pregnancy. Correlation with other   laboratory and clinical findings is essential.       Immature Granulocytes 0.5   0.4     Ketones, UA   Negative       Leukocytes, UA   Negative       Lipase 63   130     Lymph # 3.3   3.4     Lymph% 25.8   22.5     MCH 29.9   30.6     MCHC 31.7   32.4     MCV 94   95     Mono # 0.8   1.0     Mono% 6.1   6.3     MPV 9.6   9.4     Nitrite, UA   Negative       nRBC 0   0     Occult Blood UA   Negative       Opiate Scrn, Ur   Presumptive  Positive       pH, UA   6.0       Platelets 280   300     Potassium     3.0     PROTEIN TOTAL     7.6     Protein, UA   Negative  Comment:  Recommend a 24 hour urine protein or a urine   protein/creatinine ratio if globulin induced proteinuria is  clinically suspected.         RBC 3.94   4.34     RDW 14.0   14.0     Sodium     137     Specific Gravity, UA   <=1.005       Specimen UA   Urine, Clean Catch       Marijuana (THC) Metabolite   Negative       Toxicology Information   SEE COMMENT  Comment:  This screen includes the following classes of drugs at the   listed cut-off:  Benzodiazepines                  200 ng/ml  Cocaine metabolite               300 ng/ml  Opiates                         2000 ng/ml  Barbiturates                     200 ng/ml  Amphetamines                    1000 ng/ml  Marijuana metabs (THC)            50 ng/ml  Phencyclidine (PCP)               25 ng/ml  High concentrations of Methylenedioxymethamphetamine (MDMA aka  Ectasy) and other structurally similar compounds may cross-   react with the Amphetamine/Methamphetamine screening   immunoassay giving a false positive result.  Note: This exception list includes only more common   interferants in toxicology screen testing.  Because of many   cross-reactantspositive results on toxicology drug screens   should be confirmed whenever results do not correlate with   clinical presentation.  This report is intended for use in clinical monitoring and  management of patients. It is not intended for use in   employment related drug testing.  Because of any cross-reactants, positive results on toxicology  drug screens should be confirmed whenever results do not  correlate with clinical presentation.  Presumptive positive results are unconfirmed and may be used   only for medical purposes.         Urobilinogen, UA   Negative       WBC 12.68   15.15         All pertinent labs within the past 24 hours have been reviewed.    Significant Imaging: I have  reviewed and interpreted all pertinent imaging results/findings within the past 24 hours.    Assessment/Plan:     * Acute pancreatitis  Admit for observation for abdominal pain and findings by CT of mild or early pancreatitis.  2.  Continue NPO status  3.  Pain medication as needed  4.  If lipase is trending downward patient may be discharged home with bowel rest and bland diet and follow with primary care physician.  Will evaluate labs when they are returned.  Labs are pending at this time        VTE Risk Mitigation (From admission, onward)        Ordered     IP VTE LOW RISK PATIENT  Once      04/19/19 0228     Place ELENI hose  Until discontinued      04/19/19 0228             Aren Loving MD  Department of Hospital Medicine   [facility]

## 2019-04-19 NOTE — HOSPITAL COURSE
04/19/2019:  Patient is comfortable this morning in no acute distress and abdominal pain has resolved.  Lipase has decreased from 130-68 and all other labs were normal except for potassium which was low last evening at 3.0 however this has been replaced in the ER and this morning on medication administration.  It was determined this patient is stable for discharge and will be followed by her primary care physician within 1-2 days post discharge. Patient was counseled regarding her diagnosis and was advised to have clear liquids over the next 1-2 days and a bland diet for the next 4-5 days.  Patient will be discharged home in stable condition for continued treatment at home and follow up with her primary care physician early next week

## 2019-04-19 NOTE — SUBJECTIVE & OBJECTIVE
Past Medical History:   Diagnosis Date    CHF (congestive heart failure)     COPD (chronic obstructive pulmonary disease)     Depression     Hypertension     Thyroid disease        Past Surgical History:   Procedure Laterality Date    ADRENAL GLAND SURGERY      CHOLECYSTECTOMY      HERNIA REPAIR      HYSTERECTOMY      instestine      PARATHYROID GLAND SURGERY      3 surgeries       Review of patient's allergies indicates:   Allergen Reactions    Aspirin     Lortab [hydrocodone-acetaminophen] Itching    Phenergan [promethazine]     Promethazine hcl        No current facility-administered medications on file prior to encounter.      Current Outpatient Medications on File Prior to Encounter   Medication Sig    acetaminophen (TYLENOL) 325 MG tablet Take 2 tablets (650 mg total) by mouth every 6 (six) hours as needed (or equal to 101 degree F).    allopurinol (ZYLOPRIM) 300 MG tablet     amlodipine (NORVASC) 5 MG tablet Take 5 mg by mouth once daily.    clopidogrel (PLAVIX) 75 mg tablet Take 1 tablet (75 mg total) by mouth once daily.    colestipol (COLESTID) 1 gram Tab     cyanocobalamin 1,000 mcg/mL injection INJECT 1 ML INTO THE MUSCLE Q OTHER WEEK.    escitalopram (LEXAPRO) 20 MG tablet Take 20 mg by mouth once daily.    gabapentin (NEURONTIN) 800 MG tablet Take 400 mg by mouth 4 (four) times daily.     glycopyrrolate (ROBINUL) 1 mg Tab TK 1 T PO BID    HYDROcodone-acetaminophen (NORCO) 7.5-325 mg per tablet TK 1 T PO  BID PRN PAIN    ipratropium-albuterol (COMBIVENT)  mcg/actuation inhaler Inhale 1 puff into the lungs 4 (four) times daily. Rescue    levothyroxine (SYNTHROID) 50 MCG tablet Take 50 mcg by mouth once daily.    metoprolol tartrate (LOPRESSOR) 100 MG tablet Take 1 tablet (100 mg total) by mouth 2 (two) times daily.    ondansetron (ZOFRAN) 4 MG tablet Take 8 mg by mouth every 8 (eight) hours.    pantoprazole (PROTONIX) 40 MG tablet Take 40 mg by mouth once daily.     potassium chloride SA (K-DUR,KLOR-CON) 20 MEQ tablet Take 1 tablet (20 mEq total) by mouth once daily.    traZODone (DESYREL) 100 MG tablet Take 1 tablet (100 mg total) by mouth every evening.    clopidogrel (PLAVIX) 75 mg tablet Take 1 tablet (75 mg total) by mouth once daily.    lisinopril (PRINIVIL,ZESTRIL) 40 MG tablet Take 0.5 tablets (20 mg total) by mouth daily with dinner or evening meal.    nifedipine 30 MG ORAL TR24 (PROCARDIA-XL) 30 MG (OSM) 24 hr tablet Take 1 tablet (30 mg total) by mouth once daily.     Family History     Problem Relation (Age of Onset)    Cancer Maternal Grandfather    Heart disease Father    Stroke Maternal Grandmother, Mother        Tobacco Use    Smoking status: Current Every Day Smoker     Packs/day: 1.00     Years: 15.00     Pack years: 15.00   Substance and Sexual Activity    Alcohol use: No    Drug use: No    Sexual activity: Not Currently     Review of Systems   Constitutional: Positive for fatigue. Negative for activity change, appetite change and fever.   HENT: Negative for congestion, ear discharge, mouth sores, nosebleeds, rhinorrhea, sinus pressure, sinus pain and tinnitus.    Eyes: Negative.  Negative for pain, redness and itching.   Respiratory: Negative for apnea, cough, choking, chest tightness, shortness of breath, wheezing and stridor.    Cardiovascular: Negative for chest pain, palpitations and leg swelling.   Gastrointestinal: Positive for abdominal pain, nausea and vomiting. Negative for abdominal distention, anal bleeding, blood in stool, constipation and diarrhea.   Endocrine: Negative.    Genitourinary: Negative for difficulty urinating, flank pain, frequency and urgency.   Musculoskeletal: Negative for arthralgias, back pain, gait problem and myalgias.   Skin: Negative for color change and pallor.   Allergic/Immunologic: Negative.    Neurological: Negative for dizziness, facial asymmetry, weakness, light-headedness and headaches.   Hematological:  Negative for adenopathy. Does not bruise/bleed easily.   Psychiatric/Behavioral: Positive for behavioral problems. The patient is nervous/anxious.      Objective:     Vital Signs (Most Recent):  Temp: 98.1 °F (36.7 °C) (04/19/19 0256)  Pulse: (!) 57 (04/19/19 0256)  Resp: 18 (04/19/19 0256)  BP: (!) 112/56 (04/19/19 0256)  SpO2: 95 % (04/19/19 0256) Vital Signs (24h Range):  Temp:  [98.1 °F (36.7 °C)-98.4 °F (36.9 °C)] 98.1 °F (36.7 °C)  Pulse:  [57-86] 57  Resp:  [18-20] 18  SpO2:  [95 %-98 %] 95 %  BP: (112-166)/(56-88) 112/56     Weight: 84.6 kg (186 lb 8.2 oz)  Body mass index is 29.21 kg/m².    Physical Exam   Constitutional: She is oriented to person, place, and time. She appears well-developed and well-nourished.   HENT:   Head: Normocephalic and atraumatic.   Eyes: Pupils are equal, round, and reactive to light. EOM are normal.   Neck: Normal range of motion. Neck supple. No tracheal deviation present. No thyromegaly present.   Cardiovascular: Normal rate, regular rhythm and normal heart sounds.   Pulmonary/Chest: Effort normal and breath sounds normal.   Abdominal: Soft. Bowel sounds are normal. She exhibits no distension. There is no tenderness. There is no rebound and no guarding.   Musculoskeletal: Normal range of motion.   Lymphadenopathy:     She has no cervical adenopathy.   Neurological: She is alert and oriented to person, place, and time.   Skin: Skin is warm and dry. Capillary refill takes less than 2 seconds.   Psychiatric: She has a normal mood and affect. Her behavior is normal. Judgment and thought content normal.   Nursing note and vitals reviewed.        CRANIAL NERVES     CN III, IV, VI   Pupils are equal, round, and reactive to light.  Extraocular motions are normal.        Significant Labs:   Recent Lab Results       04/19/19  0534   04/19/19  0033   04/18/19  2231        Benzodiazepines   Negative       Phencyclidine   Negative       Albumin     4.4     Alkaline Phosphatase     92      ALT     14     Amphetamine Screen, Ur   Negative       Anion Gap     11     Appearance, UA   Clear       AST     17     Barbiturate Screen, Ur   Negative       Baso # 0.07   0.07     Basophil% 0.6   0.5     Bilirubin (UA)   Negative       BILIRUBIN TOTAL     0.4  Comment:  For infants and newborns, interpretation of results should be based  on gestational age, weight and in agreement with clinical  observations.  Premature Infant recommended reference ranges:  Up to 24 hours.............<8.0 mg/dL  Up to 48 hours............<12.0 mg/dL  3-5 days..................<15.0 mg/dL  6-29 days.................<15.0 mg/dL       BUN, Bld     13     Calcium     9.8     Chloride     107     CO2     19     Cocaine (Metab.)   Negative       Color, UA   Yellow       Creatinine     0.8     Creatinine, Random Ur   79.4  Comment:  The random urine reference ranges provided were established   for 24 hour urine collections.  No reference ranges exist for  random urine specimens.  Correlate clinically.         Differential Method Automated   Automated     eGFR if      >60.0     eGFR if non      >60.0  Comment:  Calculation used to obtain the estimated glomerular filtration  rate (eGFR) is the CKD-EPI equation.        Eos # 0.3   0.4     Eosinophil% 2.1   2.4     Glucose     100     Glucose, UA   Negative       Gran # (ANC) 8.2   10.3     Gran% 64.9   67.9     Hematocrit 37.2   41.0     Hemoglobin 11.8   13.3     Immature Grans (Abs) 0.06  Comment:  Mild elevation in immature granulocytes is non specific and   can be seen in a variety of conditions including stress response,   acute inflammation, trauma and pregnancy. Correlation with other   laboratory and clinical findings is essential.     0.06  Comment:  Mild elevation in immature granulocytes is non specific and   can be seen in a variety of conditions including stress response,   acute inflammation, trauma and pregnancy. Correlation with other    laboratory and clinical findings is essential.       Immature Granulocytes 0.5   0.4     Ketones, UA   Negative       Leukocytes, UA   Negative       Lipase 63   130     Lymph # 3.3   3.4     Lymph% 25.8   22.5     MCH 29.9   30.6     MCHC 31.7   32.4     MCV 94   95     Mono # 0.8   1.0     Mono% 6.1   6.3     MPV 9.6   9.4     Nitrite, UA   Negative       nRBC 0   0     Occult Blood UA   Negative       Opiate Scrn, Ur   Presumptive Positive       pH, UA   6.0       Platelets 280   300     Potassium     3.0     PROTEIN TOTAL     7.6     Protein, UA   Negative  Comment:  Recommend a 24 hour urine protein or a urine   protein/creatinine ratio if globulin induced proteinuria is  clinically suspected.         RBC 3.94   4.34     RDW 14.0   14.0     Sodium     137     Specific Gravity, UA   <=1.005       Specimen UA   Urine, Clean Catch       Marijuana (THC) Metabolite   Negative       Toxicology Information   SEE COMMENT  Comment:  This screen includes the following classes of drugs at the   listed cut-off:  Benzodiazepines                  200 ng/ml  Cocaine metabolite               300 ng/ml  Opiates                         2000 ng/ml  Barbiturates                     200 ng/ml  Amphetamines                    1000 ng/ml  Marijuana metabs (THC)            50 ng/ml  Phencyclidine (PCP)               25 ng/ml  High concentrations of Methylenedioxymethamphetamine (MDMA aka  Ectasy) and other structurally similar compounds may cross-   react with the Amphetamine/Methamphetamine screening   immunoassay giving a false positive result.  Note: This exception list includes only more common   interferants in toxicology screen testing.  Because of many   cross-reactantspositive results on toxicology drug screens   should be confirmed whenever results do not correlate with   clinical presentation.  This report is intended for use in clinical monitoring and  management of patients. It is not intended for use in   employment  related drug testing.  Because of any cross-reactants, positive results on toxicology  drug screens should be confirmed whenever results do not  correlate with clinical presentation.  Presumptive positive results are unconfirmed and may be used   only for medical purposes.         Urobilinogen, UA   Negative       WBC 12.68   15.15         All pertinent labs within the past 24 hours have been reviewed.    Significant Imaging: I have reviewed and interpreted all pertinent imaging results/findings within the past 24 hours.

## 2019-04-19 NOTE — PLAN OF CARE
Problem: Adult Inpatient Plan of Care  Goal: Absence of Hospital-Acquired Illness or Injury    Intervention: Prevent VTE (venous thromboembolism)     04/19/19 0444   Prevent or Manage Embolism   VTE Prevention/Management remove, assess skin and reapply compression stockings

## 2019-04-19 NOTE — ASSESSMENT & PLAN NOTE
Admit for observation for abdominal pain and findings by CT of mild or early pancreatitis.  2.  Continue NPO status  3.  Pain medication as needed  4.  If lipase is trending downward patient may be discharged home with bowel rest and bland diet and follow with primary care physician.  Will evaluate labs when they are returned.  Labs are pending at this time

## 2019-04-19 NOTE — PLAN OF CARE
Problem: Fall Injury Risk  Goal: Absence of Fall and Fall-Related Injury    Intervention: Promote Injury-Free Environment     04/19/19 0522   Optimize [place] and Functional Mobility   Environmental Safety Modification assistive device/personal items within reach;clutter free environment maintained;lighting adjusted;room organization consistent   Optimize Balance and Safe Activity   Safety Promotion/Fall Prevention room near unit station;side rails raised x 2         Problem: Adult Inpatient Plan of Care  Goal: Plan of Care Review     04/19/19 0522   Plan of Care Review   Plan of Care Reviewed With patient

## 2019-04-22 NOTE — PLAN OF CARE
04/22/19 1613   Final Note   Assessment Type Final Discharge Note   Anticipated Discharge Disposition Home   What phone number can be called within the next 1-3 days to see how you are doing after discharge? [phone]   Hospital Follow Up  Appt(s) scheduled? Yes   Discharge plans and expectations educations in teach back method with documentation complete? Yes   Called patient with follow up appointment with Siobhan. States she has PT at 3:00. Rescheduled time for Siobhan. Denies any discharge needs at this time.

## 2019-04-23 ENCOUNTER — OFFICE VISIT (OUTPATIENT)
Dept: FAMILY MEDICINE | Facility: CLINIC | Age: 64
End: 2019-04-23
Payer: MEDICARE

## 2019-04-23 VITALS
DIASTOLIC BLOOD PRESSURE: 76 MMHG | SYSTOLIC BLOOD PRESSURE: 128 MMHG | RESPIRATION RATE: 16 BRPM | OXYGEN SATURATION: 96 % | BODY MASS INDEX: 28.94 KG/M2 | TEMPERATURE: 97 F | HEART RATE: 62 BPM | HEIGHT: 67 IN | WEIGHT: 184.38 LBS

## 2019-04-23 DIAGNOSIS — G89.4 CHRONIC PAIN SYNDROME: ICD-10-CM

## 2019-04-23 DIAGNOSIS — K85.00 IDIOPATHIC ACUTE PANCREATITIS, UNSPECIFIED COMPLICATION STATUS: Primary | ICD-10-CM

## 2019-04-23 DIAGNOSIS — I51.9 HEART DISEASE: ICD-10-CM

## 2019-04-23 PROCEDURE — 3008F BODY MASS INDEX DOCD: CPT | Mod: CPTII,S$GLB,, | Performed by: NURSE PRACTITIONER

## 2019-04-23 PROCEDURE — 3074F SYST BP LT 130 MM HG: CPT | Mod: CPTII,S$GLB,, | Performed by: NURSE PRACTITIONER

## 2019-04-23 PROCEDURE — 3078F PR MOST RECENT DIASTOLIC BLOOD PRESSURE < 80 MM HG: ICD-10-PCS | Mod: CPTII,S$GLB,, | Performed by: NURSE PRACTITIONER

## 2019-04-23 PROCEDURE — 99213 OFFICE O/P EST LOW 20 MIN: CPT | Mod: S$GLB,,, | Performed by: NURSE PRACTITIONER

## 2019-04-23 PROCEDURE — 3078F DIAST BP <80 MM HG: CPT | Mod: CPTII,S$GLB,, | Performed by: NURSE PRACTITIONER

## 2019-04-23 PROCEDURE — 3008F PR BODY MASS INDEX (BMI) DOCUMENTED: ICD-10-PCS | Mod: CPTII,S$GLB,, | Performed by: NURSE PRACTITIONER

## 2019-04-23 PROCEDURE — 99999 PR PBB SHADOW E&M-EST. PATIENT-LVL IV: CPT | Mod: PBBFAC,,, | Performed by: NURSE PRACTITIONER

## 2019-04-23 PROCEDURE — 3074F PR MOST RECENT SYSTOLIC BLOOD PRESSURE < 130 MM HG: ICD-10-PCS | Mod: CPTII,S$GLB,, | Performed by: NURSE PRACTITIONER

## 2019-04-23 PROCEDURE — 99213 PR OFFICE/OUTPT VISIT, EST, LEVL III, 20-29 MIN: ICD-10-PCS | Mod: S$GLB,,, | Performed by: NURSE PRACTITIONER

## 2019-04-23 PROCEDURE — 99999 PR PBB SHADOW E&M-EST. PATIENT-LVL IV: ICD-10-PCS | Mod: PBBFAC,,, | Performed by: NURSE PRACTITIONER

## 2019-04-23 RX ORDER — METHOCARBAMOL 750 MG/1
TABLET, FILM COATED ORAL
COMMUNITY
Start: 2019-04-22 | End: 2019-07-22

## 2019-04-23 RX ORDER — ONDANSETRON 4 MG/1
8 TABLET, ORALLY DISINTEGRATING ORAL EVERY 8 HOURS PRN
Qty: 15 TABLET | Refills: 0 | Status: SHIPPED | OUTPATIENT
Start: 2019-04-23 | End: 2019-04-28

## 2019-04-23 RX ORDER — AMLODIPINE AND BENAZEPRIL HYDROCHLORIDE 5; 10 MG/1; MG/1
CAPSULE ORAL
COMMUNITY
Start: 2019-03-14 | End: 2019-10-17

## 2019-04-23 RX ORDER — CYANOCOBALAMIN 1000 UG/ML
1000 INJECTION, SOLUTION INTRAMUSCULAR; SUBCUTANEOUS
Qty: 1 ML | Refills: 2 | Status: SHIPPED | OUTPATIENT
Start: 2019-04-23 | End: 2019-05-07 | Stop reason: SDUPTHER

## 2019-04-23 RX ORDER — GABAPENTIN 400 MG/1
CAPSULE ORAL
COMMUNITY
Start: 2019-01-14 | End: 2019-08-06 | Stop reason: SDUPTHER

## 2019-04-23 NOTE — PROGRESS NOTES
Subjective:       Patient ID: Alessia Nelson is a 63 y.o. female.    Chief Complaint: Follow-up (hospital discharge)    Alessia Nelson is a 63 year old female who presents to the clinic today for hospital follow up regarding pancreatitis. Patient reports she was discharged on Friday 4/19/19. She is seen by pain management and taking her medication as prescribed. She states overall she was doing better, but thinks she may have ate something last night that caused her stomach to be upset. She denies any changes in stool, vomiting, fever, chills. She does smoke cigarettes and denies alcohol use. She does state that 23 years ago she had an episode of pancreatitis. She denies cp or sob.   Ms. Nelson currently is seeing a cardiologist in [place] but requesting for referral to cardiology. She has hx of stent and currently on plavix. She denies any cardiac concerns, but wanting to establish with [facility] and her annual appt is due.  She is also requesting a referral sent to Dr. Aragon for pain management as she states she wants to stay in the [facility]. She is currently seeing pain management in [place],  reviewed.     Review of Systems   Constitutional: Positive for fatigue. Negative for activity change, chills, fever and unexpected weight change.   HENT: Negative for congestion, ear pain, postnasal drip, sinus pressure, sneezing, sore throat and tinnitus.    Eyes: Negative for pain, redness and visual disturbance.   Respiratory: Negative for apnea, cough, chest tightness, shortness of breath and wheezing.    Cardiovascular: Negative for chest pain, palpitations and leg swelling.   Gastrointestinal: Positive for abdominal pain and nausea (intermittent). Negative for abdominal distention, blood in stool, constipation, diarrhea and vomiting.   Endocrine: Negative for polydipsia, polyphagia and polyuria.   Genitourinary: Negative for difficulty urinating, dysuria, flank pain, frequency, hematuria  "and urgency.   Musculoskeletal: Positive for arthralgias, back pain and myalgias. Negative for joint swelling.        Sees pain management; requesting referral to stay with [facility]   Skin: Negative for color change, pallor and rash.   Allergic/Immunologic: Negative for environmental allergies, food allergies and immunocompromised state.   Neurological: Negative for dizziness, tremors, syncope, weakness, light-headedness and headaches.   Hematological: Negative for adenopathy. Does not bruise/bleed easily.   Psychiatric/Behavioral: Negative for agitation, confusion, decreased concentration, self-injury, sleep disturbance and suicidal ideas. The patient is not nervous/anxious and is not hyperactive.          Reviewed family, medical, surgical, and social history.    Objective:      /76 (BP Location: Left arm, Patient Position: Sitting, BP Method: Large (Automatic))   Pulse 62   Temp 97.4 °F (36.3 °C) (Oral)   Resp 16   Ht 5' 7" (1.702 m)   Wt 83.6 kg (184 lb 6.4 oz)   SpO2 96%   BMI 28.88 kg/m²   Physical Exam   Constitutional: She is oriented to person, place, and time. She appears well-developed and well-nourished. She is active and cooperative. No distress.   HENT:   Head: Normocephalic and atraumatic.   Right Ear: Hearing normal. No drainage. No decreased hearing is noted.   Left Ear: Hearing normal. No drainage. No decreased hearing is noted.   Nose: Nose normal. No mucosal edema, rhinorrhea or nasal deformity. No epistaxis.   Mouth/Throat: Uvula is midline, oropharynx is clear and moist and mucous membranes are normal. No uvula swelling. No oropharyngeal exudate or posterior oropharyngeal erythema. No tonsillar exudate.   Eyes: Pupils are equal, round, and reactive to light. Conjunctivae, EOM and lids are normal. Right eye exhibits no discharge. Left eye exhibits no discharge.   Neck: Trachea normal and full passive range of motion without pain. No JVD present. No tracheal tenderness and no " muscular tenderness present. Carotid bruit is not present. No edema and no erythema present. No thyroid mass and no thyromegaly present.   Cardiovascular: Normal rate, regular rhythm, S1 normal, S2 normal, normal heart sounds, intact distal pulses and normal pulses.   Pulmonary/Chest: Effort normal and breath sounds normal. No stridor. No tachypnea and no bradypnea. No respiratory distress. She has no decreased breath sounds. She has no wheezes. She has no rhonchi. She has no rales.   Abdominal: Soft. Normal appearance and bowel sounds are normal. She exhibits no distension and no abdominal bruit. There is no tenderness. There is no rigidity, no guarding and no CVA tenderness.       Musculoskeletal: She exhibits no edema, tenderness or deformity.        Right foot: There is normal range of motion.        Left foot: There is normal range of motion.   Lymphadenopathy:     She has no cervical adenopathy.   Neurological: She is alert and oriented to person, place, and time. She has normal strength. She exhibits normal muscle tone.   Skin: Skin is warm, dry and intact. Capillary refill takes less than 2 seconds. No abrasion, no laceration, no lesion and no rash noted. She is not diaphoretic. No cyanosis. Nails show no clubbing.   Psychiatric: She has a normal mood and affect. Her speech is normal and behavior is normal. Judgment and thought content normal. Cognition and memory are normal.       Assessment:       1. Idiopathic acute pancreatitis, unspecified complication status    2. Chronic pain syndrome    3. Heart disease        Plan:       Idiopathic acute pancreatitis, unspecified complication status  -     Ambulatory referral to Gastroenterology  -     CBC auto differential; Future; Expected date: 04/23/2019  -     Comprehensive metabolic panel; Future; Expected date: 04/23/2019  -     Amylase; Future; Expected date: 04/23/2019  -     Lipase; Future; Expected date: 04/23/2019    Chronic pain syndrome  -      Ambulatory referral to Pain Clinic    Heart disease  -     Ambulatory referral to Cardiology    Other orders  -     ondansetron (ZOFRAN-ODT) 4 MG TbDL; Take 2 tablets (8 mg total) by mouth every 8 (eight) hours as needed.  Dispense: 15 tablet; Refill: 0  -     cyanocobalamin 1,000 mcg/mL injection; Inject 1 mL (1,000 mcg total) into the muscle every 14 (fourteen) days. Take 1 injection every other week  Dispense: 1 mL; Refill: 2          PLAN:  - Discussed with patient the plan of care  - Encouraged clear liquid diet for 24 hours then increase to bland diet  - Low fat diet encouraged daily  - Encouraged smoking cessation  - Medications reviewed. Medication side effects discussed. Patient has no questions or concerns at this time. Informed patient to notify me regarding any concerns.   - Hydration with water encouraged  - Referral sent to pain management and cardiology    - Informed patient to please notify me with any questions or concerns at anytime  - Follow up ordered for May 7th         Risks, benefits, and side effects were discussed with the patient. All questions were answered to the fullest satisfaction of the patient, and pt verbalized understanding and agreement to treatment plan. Pt was to call with any new or worsening symptoms, or present to the ER.

## 2019-04-25 ENCOUNTER — CLINICAL SUPPORT (OUTPATIENT)
Dept: REHABILITATION | Facility: HOSPITAL | Age: 64
End: 2019-04-25
Payer: MEDICARE

## 2019-04-25 DIAGNOSIS — M54.41 CHRONIC BILATERAL LOW BACK PAIN WITH RIGHT-SIDED SCIATICA: Primary | ICD-10-CM

## 2019-04-25 DIAGNOSIS — M79.7 FIBROMYALGIA: ICD-10-CM

## 2019-04-25 DIAGNOSIS — G89.29 CHRONIC BILATERAL LOW BACK PAIN WITH RIGHT-SIDED SCIATICA: Primary | ICD-10-CM

## 2019-04-25 PROCEDURE — 97113 AQUATIC THERAPY/EXERCISES: CPT | Mod: PN

## 2019-04-25 NOTE — PROGRESS NOTES
Physical Therapy Daily Note     Name: Alessia Nelson  Clinic Number: [id number]  Diagnosis:   Encounter Diagnoses   Name Primary?    Chronic bilateral low back pain with right-sided sciatica Yes    Fibromyalgia      Physician: rBent Lewis,*  Precautions: standard  Visit #: 4 of 12  PTA Visit #: 0  Time In: 1 45 PM   Time Out: 2: 40 PM     Subjective     Pt reports:  Alessia spent 2 days in the hospital last week for pancreatitis. Stated that she was feeling better, reported that she had been having abdominal pain for awhile, but was hoping it would go away.    Pain Scale: Alessia rates pain on a scale of 0-10 to be 4 currently.    Objective     Alessia received individual therapeutic exercises to develop strength, ROM, flexibility, posture and core stabilization for 30  minutes including: Aquatic exercise today - see below  1. DKTC with SB x 2.5 mins   2. Bridges x 10   3. LTR x 2.5 mins with towel between knees   4. Leg Lengthening x 10   5. Clavicle lengthening x 15  6. Clams/Reverse Clams  7. Prone - alternate arm and leg x 10  8. Prone - lumbar extension x 10  9. Cat/Camel - alternate with mid-back stretch   10. Seated LAQ's   11. Seated Lumbar flexion with SB     Aquatic Therapy today (4/25/2019) :  1:1 x 15 mins   1. Multidirectional walking   2. Hip exercises- 4 way  3. Squats  4. H/S Stretching  5. Lunges across pool  6. Trunk Side Bending  7. Trunk Rotation  8.  Heel Raises  9. Lateral lunges  10. Bicycle with noodle  11. DKC with noodle       Alessia received the following manual therapy techniques:      The patient received the following direct contact modalities after being cleared for contraindications:     The patient received the following supervised modalities after being cleared for contradictions:     Written Home Exercises Provided: not at this time.   Pt demo good understanding of the education provided. Alessia demonstrated good return  demonstration of activities.     Education provided re:  Alessia verbalized good understanding of education provided.   No spiritual or educational barriers to learning provided    Assessment     Patient tolerated treatment well; Needs encouragement to move more, but does extremely well in the pool   This is a 63 y.o. female referred to outpatient physical therapy and presents with a medical diagnosis of failed back surgery with chronic lumbar pain and demonstrates limitations as described in the problem list. Pt prognosis is Good. Pt will continue to benefit from skilled outpatient physical therapy to address the deficits listed in the problem list, provide pt/family education and to maximize pt's level of independence in the home and community environment.     Goals as follows:  Long Term Goals: 6 weeks     Pain: Decrease pain to 4/10 to allow for improved ability to perform daily/recreational activities   Strength: Improve strength in core muscles to 4/5 for improved lumbopelvic stability  ROM: Improve ROM to 75% of normal limits   Functional scale: Improve score on Oswestry  to <50%  Lifting: Lift 20 lbs to waist level, 10 lbs to shoulder level, 10 lbs to overhead without pain or compensation  Walking: Increase walking distance and/or duration to 1 mile without pain   Postures: Increase sitting and/or standing duration to 30 mins  without pain   Transfers: Perform floor to standing transfers without increased pain or limitation  Exercise: demonstrate independence with home exercise program to maintain gains made in therapy.         Plan     Continue with established Plan of Care towards PT goals.    Therapist: Trinh Reyes, PT  4/25/2019

## 2019-04-26 ENCOUNTER — LAB VISIT (OUTPATIENT)
Dept: LAB | Facility: HOSPITAL | Age: 64
End: 2019-04-26
Attending: NURSE PRACTITIONER
Payer: MEDICARE

## 2019-04-26 ENCOUNTER — CLINICAL SUPPORT (OUTPATIENT)
Dept: REHABILITATION | Facility: HOSPITAL | Age: 64
End: 2019-04-26
Payer: MEDICARE

## 2019-04-26 DIAGNOSIS — K85.00 IDIOPATHIC ACUTE PANCREATITIS, UNSPECIFIED COMPLICATION STATUS: ICD-10-CM

## 2019-04-26 DIAGNOSIS — M54.41 CHRONIC BILATERAL LOW BACK PAIN WITH RIGHT-SIDED SCIATICA: Primary | ICD-10-CM

## 2019-04-26 DIAGNOSIS — G89.29 CHRONIC BILATERAL LOW BACK PAIN WITH RIGHT-SIDED SCIATICA: Primary | ICD-10-CM

## 2019-04-26 DIAGNOSIS — M79.7 FIBROMYALGIA: ICD-10-CM

## 2019-04-26 LAB
ALBUMIN SERPL BCP-MCNC: 4 G/DL (ref 3.5–5.2)
ALP SERPL-CCNC: 91 U/L (ref 55–135)
ALT SERPL W/O P-5'-P-CCNC: 12 U/L (ref 10–44)
AMYLASE SERPL-CCNC: 27 U/L (ref 20–110)
ANION GAP SERPL CALC-SCNC: 10 MMOL/L (ref 8–16)
AST SERPL-CCNC: 17 U/L (ref 10–40)
BASOPHILS # BLD AUTO: 0.08 K/UL (ref 0–0.2)
BASOPHILS NFR BLD: 0.7 % (ref 0–1.9)
BILIRUB SERPL-MCNC: 0.2 MG/DL (ref 0.1–1)
BUN SERPL-MCNC: 16 MG/DL (ref 8–23)
CALCIUM SERPL-MCNC: 9.3 MG/DL (ref 8.7–10.5)
CHLORIDE SERPL-SCNC: 108 MMOL/L (ref 95–110)
CO2 SERPL-SCNC: 22 MMOL/L (ref 23–29)
CREAT SERPL-MCNC: 1 MG/DL (ref 0.5–1.4)
DIFFERENTIAL METHOD: ABNORMAL
EOSINOPHIL # BLD AUTO: 0.3 K/UL (ref 0–0.5)
EOSINOPHIL NFR BLD: 2.6 % (ref 0–8)
ERYTHROCYTE [DISTWIDTH] IN BLOOD BY AUTOMATED COUNT: 14.2 % (ref 11.5–14.5)
EST. GFR  (AFRICAN AMERICAN): >60 ML/MIN/1.73 M^2
EST. GFR  (NON AFRICAN AMERICAN): >60 ML/MIN/1.73 M^2
GLUCOSE SERPL-MCNC: 109 MG/DL (ref 70–110)
HCT VFR BLD AUTO: 37.6 % (ref 37–48.5)
HGB BLD-MCNC: 12.2 G/DL (ref 12–16)
IMM GRANULOCYTES # BLD AUTO: 0.03 K/UL (ref 0–0.04)
IMM GRANULOCYTES NFR BLD AUTO: 0.2 % (ref 0–0.5)
LIPASE SERPL-CCNC: 39 U/L (ref 4–60)
LYMPHOCYTES # BLD AUTO: 4 K/UL (ref 1–4.8)
LYMPHOCYTES NFR BLD: 32.9 % (ref 18–48)
MCH RBC QN AUTO: 30.7 PG (ref 27–31)
MCHC RBC AUTO-ENTMCNC: 32.4 G/DL (ref 32–36)
MCV RBC AUTO: 95 FL (ref 82–98)
MONOCYTES # BLD AUTO: 0.8 K/UL (ref 0.3–1)
MONOCYTES NFR BLD: 6.3 % (ref 4–15)
NEUTROPHILS # BLD AUTO: 7 K/UL (ref 1.8–7.7)
NEUTROPHILS NFR BLD: 57.3 % (ref 38–73)
NRBC BLD-RTO: 0 /100 WBC
PLATELET # BLD AUTO: 298 K/UL (ref 150–350)
PMV BLD AUTO: 9.8 FL (ref 9.2–12.9)
POTASSIUM SERPL-SCNC: 3.7 MMOL/L (ref 3.5–5.1)
PROT SERPL-MCNC: 6.7 G/DL (ref 6–8.4)
RBC # BLD AUTO: 3.97 M/UL (ref 4–5.4)
SODIUM SERPL-SCNC: 140 MMOL/L (ref 136–145)
WBC # BLD AUTO: 12.19 K/UL (ref 3.9–12.7)

## 2019-04-26 PROCEDURE — 36415 COLL VENOUS BLD VENIPUNCTURE: CPT

## 2019-04-26 PROCEDURE — 80053 COMPREHEN METABOLIC PANEL: CPT

## 2019-04-26 PROCEDURE — 97113 AQUATIC THERAPY/EXERCISES: CPT | Mod: PN

## 2019-04-26 PROCEDURE — 85025 COMPLETE CBC W/AUTO DIFF WBC: CPT

## 2019-04-26 PROCEDURE — 82150 ASSAY OF AMYLASE: CPT

## 2019-04-26 PROCEDURE — 83690 ASSAY OF LIPASE: CPT

## 2019-04-26 NOTE — PROGRESS NOTES
Physical Therapy Daily Note     Name: Alessia Nelson  Clinic Number: [id number]  Diagnosis:   Encounter Diagnoses   Name Primary?    Chronic bilateral low back pain with right-sided sciatica Yes    Fibromyalgia      Physician: Brent Lewis,*  Precautions: standard  Visit #: 5 of 12  PTA Visit #: 0  Time In:  7:30 AM  Time Out:  8:45 AM     Subjective     Pt reports:  Alessia reports continued improvement with overall pain while she is in the water. Hoping to get her pool ready at home with weekend since the weather is supposed to be warmer next week.   Pain Scale: Alessia rates pain on a scale of 0-10 to be 4 currently.    Objective     Alessia received individual therapeutic exercises to develop strength, ROM, flexibility, posture and core stabilization for 30  minutes including: Aquatic exercise today - see below  1. DKTC with SB x 2.5 mins   2. Bridges x 10   3. LTR x 2.5 mins with towel between knees   4. Leg Lengthening x 10   5. Clavicle lengthening x 15  6. Clams/Reverse Clams  7. Prone - alternate arm and leg x 10  8. Prone - lumbar extension x 10  9. Cat/Camel - alternate with mid-back stretch   10. Seated LAQ's   11. Seated Lumbar flexion with SB     Aquatic Therapy today (4/26/2019) :  1:1 x 15 mins   1. Multidirectional walking   2. Hip exercises- 4 way  3. Squats  4. H/S Stretching  5. Lunges across pool  6. Trunk Side Bending  7. Trunk Rotation  8.  Heel Raises  9. Lateral lunges  10. Bicycle with noodle  11. DKC with noodle       Alessia received the following manual therapy techniques:      The patient received the following direct contact modalities after being cleared for contraindications:     The patient received the following supervised modalities after being cleared for contradictions:     Written Home Exercises Provided: not at this time.   Pt demo good understanding of the education provided. Alessia demonstrated good return demonstration  of activities.     Education provided re:  Alessia verbalized good understanding of education provided.   No spiritual or educational barriers to learning provided    Assessment     Patient tolerated treatment well; Enjoys exercises in the pool and noting less back pain.    This is a 63 y.o. female referred to outpatient physical therapy and presents with a medical diagnosis of failed back surgery with chronic lumbar pain and demonstrates limitations as described in the problem list. Pt prognosis is Good. Pt will continue to benefit from skilled outpatient physical therapy to address the deficits listed in the problem list, provide pt/family education and to maximize pt's level of independence in the home and community environment.     Goals as follows:  Long Term Goals: 6 weeks     Pain: Decrease pain to 4/10 to allow for improved ability to perform daily/recreational activities   Strength: Improve strength in core muscles to 4/5 for improved lumbopelvic stability  ROM: Improve ROM to 75% of normal limits   Functional scale: Improve score on Oswestry  to <50%  Lifting: Lift 20 lbs to waist level, 10 lbs to shoulder level, 10 lbs to overhead without pain or compensation  Walking: Increase walking distance and/or duration to 1 mile without pain   Postures: Increase sitting and/or standing duration to 30 mins  without pain   Transfers: Perform floor to standing transfers without increased pain or limitation  Exercise: demonstrate independence with home exercise program to maintain gains made in therapy.         Plan     Continue with established Plan of Care towards PT goals.    Therapist: Trinh Reyes, PT  4/26/2019

## 2019-04-29 RX ORDER — CLOPIDOGREL BISULFATE 75 MG/1
75 TABLET ORAL DAILY
Qty: 90 TABLET | Refills: 3 | Status: ON HOLD | OUTPATIENT
Start: 2019-04-29 | End: 2019-05-27 | Stop reason: CLARIF

## 2019-04-29 RX ORDER — CLOPIDOGREL BISULFATE 75 MG/1
75 TABLET ORAL DAILY
Qty: 14 TABLET | Refills: 0 | Status: SHIPPED | OUTPATIENT
Start: 2019-04-29 | End: 2020-02-19

## 2019-04-29 NOTE — PROGRESS NOTES
Please let Ms. Nelson know I have reviewed her labs. All labs have improved. Pancreatic levels within normal limits. Patient to follow up in 1 week with me. Please let me know if she has any concerns  Thanks

## 2019-04-29 NOTE — TELEPHONE ENCOUNTER
Pt requesting Plavix refill, with a temporary refill to her local pharmacy to avoid running out of the med.   Please advise, thank you.

## 2019-04-30 ENCOUNTER — CLINICAL SUPPORT (OUTPATIENT)
Dept: REHABILITATION | Facility: HOSPITAL | Age: 64
End: 2019-04-30
Payer: MEDICARE

## 2019-04-30 DIAGNOSIS — G89.29 CHRONIC BILATERAL LOW BACK PAIN WITH RIGHT-SIDED SCIATICA: Primary | ICD-10-CM

## 2019-04-30 DIAGNOSIS — M54.41 CHRONIC BILATERAL LOW BACK PAIN WITH RIGHT-SIDED SCIATICA: Primary | ICD-10-CM

## 2019-04-30 DIAGNOSIS — M79.7 FIBROMYALGIA: ICD-10-CM

## 2019-04-30 PROCEDURE — 97113 AQUATIC THERAPY/EXERCISES: CPT | Mod: PN

## 2019-05-01 DIAGNOSIS — Z12.39 BREAST CANCER SCREENING: ICD-10-CM

## 2019-05-01 NOTE — PROGRESS NOTES
Physical Therapy Daily Note     Name: Alessia Nelson  Clinic Number: [id number]  Diagnosis:   Encounter Diagnoses   Name Primary?    Chronic bilateral low back pain with right-sided sciatica Yes    Fibromyalgia      Physician: Brent Lewis,*  Precautions: standard  Visit #: 6 of 12  PTA Visit #: 0  Time In:  2:00 PM   Time Out:  3: 10 PM      Subjective     Pt reports:  Alessia reports continued improvement with overall pain while she is in the water. Hoping to get her pool ready at home with weekend since the weather is supposed to be warmer next week.   Pain Scale: Alessia rates pain on a scale of 0-10 to be 4 currently.    Objective     Alessia received individual therapeutic exercises to develop strength, ROM, flexibility, posture and core stabilization for 30  minutes including: Aquatic exercise today - see below  1. DKTC with SB x 2.5 mins   2. Bridges x 10   3. LTR x 2.5 mins with towel between knees   4. Leg Lengthening x 10   5. Clavicle lengthening x 15  6. Clams/Reverse Clams  7. Prone - alternate arm and leg x 10  8. Prone - lumbar extension x 10  9. Cat/Camel - alternate with mid-back stretch   10. Seated LAQ's   11. Seated Lumbar flexion with SB     Aquatic Therapy today (4/30/2019) :  1:1 x 15 mins   1. Multidirectional walking   2. Hip exercises- 4 way  3. Squats  4. H/S Stretching  5. Lunges across pool  6. Trunk Side Bending  7. Trunk Rotation  8.  Heel Raises  9. Lateral lunges  10. Bicycle with noodle  11. DKC with noodle       Alessia received the following manual therapy techniques:      The patient received the following direct contact modalities after being cleared for contraindications:     The patient received the following supervised modalities after being cleared for contradictions:     Written Home Exercises Provided: not at this time.   Pt demo good understanding of the education provided. Alessia demonstrated good return  demonstration of activities.     Education provided re:  Alessia verbalized good understanding of education provided.   No spiritual or educational barriers to learning provided    Assessment     Patient tolerated treatment well; Enjoys exercises in the pool and noting less back pain.    This is a 63 y.o. female referred to outpatient physical therapy and presents with a medical diagnosis of failed back surgery with chronic lumbar pain and demonstrates limitations as described in the problem list. Pt prognosis is Good. Pt will continue to benefit from skilled outpatient physical therapy to address the deficits listed in the problem list, provide pt/family education and to maximize pt's level of independence in the home and community environment.     Goals as follows:  Long Term Goals: 6 weeks     Pain: Decrease pain to 4/10 to allow for improved ability to perform daily/recreational activities   Strength: Improve strength in core muscles to 4/5 for improved lumbopelvic stability  ROM: Improve ROM to 75% of normal limits   Functional scale: Improve score on Oswestry  to <50%  Lifting: Lift 20 lbs to waist level, 10 lbs to shoulder level, 10 lbs to overhead without pain or compensation  Walking: Increase walking distance and/or duration to 1 mile without pain   Postures: Increase sitting and/or standing duration to 30 mins  without pain   Transfers: Perform floor to standing transfers without increased pain or limitation  Exercise: demonstrate independence with home exercise program to maintain gains made in therapy.         Plan     Continue with established Plan of Care towards PT goals.    Therapist: Trinh Reyes, PT  4/30/2019

## 2019-05-02 ENCOUNTER — CLINICAL SUPPORT (OUTPATIENT)
Dept: REHABILITATION | Facility: HOSPITAL | Age: 64
End: 2019-05-02
Payer: MEDICARE

## 2019-05-02 DIAGNOSIS — Z12.11 COLON CANCER SCREENING: ICD-10-CM

## 2019-05-02 DIAGNOSIS — Z11.59 NEED FOR HEPATITIS C SCREENING TEST: ICD-10-CM

## 2019-05-02 DIAGNOSIS — M79.7 FIBROMYALGIA: ICD-10-CM

## 2019-05-02 DIAGNOSIS — G89.29 CHRONIC BILATERAL LOW BACK PAIN WITH RIGHT-SIDED SCIATICA: Primary | ICD-10-CM

## 2019-05-02 DIAGNOSIS — M54.41 CHRONIC BILATERAL LOW BACK PAIN WITH RIGHT-SIDED SCIATICA: Primary | ICD-10-CM

## 2019-05-02 PROCEDURE — 97113 AQUATIC THERAPY/EXERCISES: CPT | Mod: PN

## 2019-05-02 NOTE — PROGRESS NOTES
Physical Therapy Daily Note     Name: Alessia Nelson  Clinic Number: [id number]  Diagnosis:   Encounter Diagnoses   Name Primary?    Chronic bilateral low back pain with right-sided sciatica Yes    Fibromyalgia      Physician: Brent Lewis,*  Precautions: standard  Visit #: 7 of 12  PTA Visit #: 0  Time In:  2:00 PM   Time Out:  3: 10 PM      Subjective     Pt reports:  Alessia reports that she is having some lower back pain with Right radicular SX's over the last several days. Hoping to work it out in the pool.   Pain Scale: Alessia rates pain on a scale of 0-10 to be 4 currently.    Objective     Alessia received individual therapeutic exercises to develop strength, ROM, flexibility, posture and core stabilization for 30  minutes including: Aquatic exercise today - see below  1. DKTC with SB x 2.5 mins   2. Bridges x 10   3. LTR x 2.5 mins with towel between knees   4. Leg Lengthening x 10   5. Clavicle lengthening x 15  6. Clams/Reverse Clams  7. Prone - alternate arm and leg x 10  8. Prone - lumbar extension x 10  9. Cat/Camel - alternate with mid-back stretch   10. Seated LAQ's   11. Seated Lumbar flexion with SB     Aquatic Therapy today (05/2/2019) :  1:1 x 15 mins   1. Multidirectional walking   2. Hip exercises- 4 way  3. Squats  4. H/S Stretching  5. Lunges across pool  6. Trunk Side Bending  7. Trunk Rotation  8.  Heel Raises  9. Lateral lunges  10. Bicycle with noodle  11. DKC with noodle       Alessia received the following manual therapy techniques:      The patient received the following direct contact modalities after being cleared for contraindications:     The patient received the following supervised modalities after being cleared for contradictions:     Written Home Exercises Provided: not at this time.   Pt demo good understanding of the education provided. Alessia demonstrated good return demonstration of activities.     Education provided  re:  Alessia verbalized good understanding of education provided.   No spiritual or educational barriers to learning provided    Assessment     Patient tolerated treatment well, but continues to report back/leg pain even after aquatic exercise today. Going back to [facility] next week - also to have new MRI before MD visit. She will be here before MD visit for another therapy session.    This is a 63 y.o. female referred to outpatient physical therapy and presents with a medical diagnosis of failed back surgery with chronic lumbar pain and demonstrates limitations as described in the problem list. Pt prognosis is Good. Pt will continue to benefit from skilled outpatient physical therapy to address the deficits listed in the problem list, provide pt/family education and to maximize pt's level of independence in the home and community environment.     Goals as follows:  Long Term Goals: 6 weeks     Pain: Decrease pain to 4/10 to allow for improved ability to perform daily/recreational activities   Strength: Improve strength in core muscles to 4/5 for improved lumbopelvic stability  ROM: Improve ROM to 75% of normal limits   Functional scale: Improve score on Oswestry  to <50%  Lifting: Lift 20 lbs to waist level, 10 lbs to shoulder level, 10 lbs to overhead without pain or compensation  Walking: Increase walking distance and/or duration to 1 mile without pain   Postures: Increase sitting and/or standing duration to 30 mins  without pain   Transfers: Perform floor to standing transfers without increased pain or limitation  Exercise: demonstrate independence with home exercise program to maintain gains made in therapy.         Plan     Continue with established Plan of Care towards PT goals.    Therapist: Trinh Reyes, PT  5/2/2019

## 2019-05-07 ENCOUNTER — OFFICE VISIT (OUTPATIENT)
Dept: FAMILY MEDICINE | Facility: CLINIC | Age: 64
End: 2019-05-07
Payer: MEDICARE

## 2019-05-07 VITALS
SYSTOLIC BLOOD PRESSURE: 138 MMHG | BODY MASS INDEX: 29.46 KG/M2 | OXYGEN SATURATION: 95 % | HEART RATE: 57 BPM | TEMPERATURE: 98 F | RESPIRATION RATE: 18 BRPM | WEIGHT: 187.69 LBS | HEIGHT: 67 IN | DIASTOLIC BLOOD PRESSURE: 77 MMHG

## 2019-05-07 DIAGNOSIS — I51.9 HEART DISEASE: ICD-10-CM

## 2019-05-07 DIAGNOSIS — E53.8 B12 DEFICIENCY: ICD-10-CM

## 2019-05-07 DIAGNOSIS — I10 ESSENTIAL HYPERTENSION: ICD-10-CM

## 2019-05-07 DIAGNOSIS — I71.40 ABDOMINAL AORTIC ANEURYSM (AAA) WITHOUT RUPTURE: Primary | ICD-10-CM

## 2019-05-07 DIAGNOSIS — Z87.19 HX OF PANCREATITIS: ICD-10-CM

## 2019-05-07 DIAGNOSIS — G89.4 CHRONIC PAIN SYNDROME: ICD-10-CM

## 2019-05-07 PROCEDURE — 99213 OFFICE O/P EST LOW 20 MIN: CPT | Mod: S$GLB,,, | Performed by: NURSE PRACTITIONER

## 2019-05-07 PROCEDURE — 99213 PR OFFICE/OUTPT VISIT, EST, LEVL III, 20-29 MIN: ICD-10-PCS | Mod: S$GLB,,, | Performed by: NURSE PRACTITIONER

## 2019-05-07 PROCEDURE — 3075F SYST BP GE 130 - 139MM HG: CPT | Mod: CPTII,S$GLB,, | Performed by: NURSE PRACTITIONER

## 2019-05-07 PROCEDURE — 3078F DIAST BP <80 MM HG: CPT | Mod: CPTII,S$GLB,, | Performed by: NURSE PRACTITIONER

## 2019-05-07 PROCEDURE — 3075F PR MOST RECENT SYSTOLIC BLOOD PRESS GE 130-139MM HG: ICD-10-PCS | Mod: CPTII,S$GLB,, | Performed by: NURSE PRACTITIONER

## 2019-05-07 PROCEDURE — 99999 PR PBB SHADOW E&M-EST. PATIENT-LVL IV: ICD-10-PCS | Mod: PBBFAC,,, | Performed by: NURSE PRACTITIONER

## 2019-05-07 PROCEDURE — 3008F BODY MASS INDEX DOCD: CPT | Mod: CPTII,S$GLB,, | Performed by: NURSE PRACTITIONER

## 2019-05-07 PROCEDURE — 3078F PR MOST RECENT DIASTOLIC BLOOD PRESSURE < 80 MM HG: ICD-10-PCS | Mod: CPTII,S$GLB,, | Performed by: NURSE PRACTITIONER

## 2019-05-07 PROCEDURE — 99999 PR PBB SHADOW E&M-EST. PATIENT-LVL IV: CPT | Mod: PBBFAC,,, | Performed by: NURSE PRACTITIONER

## 2019-05-07 PROCEDURE — 3008F PR BODY MASS INDEX (BMI) DOCUMENTED: ICD-10-PCS | Mod: CPTII,S$GLB,, | Performed by: NURSE PRACTITIONER

## 2019-05-07 RX ORDER — CYANOCOBALAMIN 1000 UG/ML
1000 INJECTION, SOLUTION INTRAMUSCULAR; SUBCUTANEOUS
Qty: 1 ML | Refills: 2 | Status: SHIPPED | OUTPATIENT
Start: 2019-05-07 | End: 2019-05-16 | Stop reason: SDUPTHER

## 2019-05-07 NOTE — PROGRESS NOTES
Subjective:       Patient ID: Alessia Nelson is a 63 y.o. female.    Chief Complaint: Follow-up (b-12 dosage, special needs dog for medical)    Alessia Nelson is a 63 year old female who presents to the clinic today for a few concerns. First, she wants her B12 injections to be changed, in which she can  a few prescriptions at 1 time, rather than paying $10.00 for 1 at a time. Secondly, she is requesting a letter regarding her dog as an emotional support email. Ms. Nelson is by herself, and her dog, Claire keeps her company and prevents her from being lonely. She reports that her dog also helps with her mood, and anxiety. She also states that she has not received a call from Cardiology or GI regarding an appt. She denies any CP, SOB, n/v/d.     Review of Systems   Constitutional: Negative for activity change, chills, fatigue, fever and unexpected weight change.   HENT: Negative for congestion, ear pain, postnasal drip, sinus pressure, sneezing, sore throat and tinnitus.    Eyes: Negative for pain, redness and visual disturbance.   Respiratory: Negative for apnea, cough, chest tightness, shortness of breath and wheezing.    Cardiovascular: Negative for chest pain, palpitations and leg swelling.   Gastrointestinal: Positive for abdominal pain and nausea (intermittent). Negative for abdominal distention, blood in stool, constipation, diarrhea and vomiting.   Endocrine: Negative for polydipsia, polyphagia and polyuria.   Genitourinary: Negative for difficulty urinating, dysuria, flank pain, frequency, hematuria and urgency.   Musculoskeletal: Positive for arthralgias, back pain and myalgias. Negative for joint swelling.        Sees pain management; requesting referral to stay with [facility]   Skin: Negative for color change, pallor and rash.   Allergic/Immunologic: Negative for environmental allergies, food allergies and immunocompromised state.   Neurological: Negative for dizziness, tremors, syncope,  "weakness, light-headedness and headaches.   Hematological: Negative for adenopathy. Does not bruise/bleed easily.   Psychiatric/Behavioral: Negative for agitation, confusion, decreased concentration, self-injury, sleep disturbance and suicidal ideas. The patient is not nervous/anxious and is not hyperactive.          Reviewed family, medical, surgical, and social history.    Objective:      /77 (BP Location: Right arm, Patient Position: Sitting, BP Method: Medium (Automatic))   Pulse (!) 57   Temp 97.8 °F (36.6 °C) (Tympanic)   Resp 18   Ht 5' 7" (1.702 m)   Wt 85.1 kg (187 lb 11.2 oz)   SpO2 95%   BMI 29.40 kg/m²   Physical Exam   Constitutional: She is oriented to person, place, and time. She appears well-developed and well-nourished. She is active and cooperative. No distress.   HENT:   Head: Normocephalic and atraumatic.   Right Ear: Hearing normal. No drainage. No decreased hearing is noted.   Left Ear: Hearing normal. No drainage. No decreased hearing is noted.   Nose: Nose normal. No mucosal edema, rhinorrhea or nasal deformity. No epistaxis.   Mouth/Throat: Uvula is midline, oropharynx is clear and moist and mucous membranes are normal. No uvula swelling. No oropharyngeal exudate or posterior oropharyngeal erythema. No tonsillar exudate.   Eyes: Pupils are equal, round, and reactive to light. Conjunctivae, EOM and lids are normal. Right eye exhibits no discharge. Left eye exhibits no discharge.   Neck: Trachea normal and full passive range of motion without pain. No JVD present. No tracheal tenderness and no muscular tenderness present. Carotid bruit is not present. No edema and no erythema present. No thyroid mass and no thyromegaly present.   Cardiovascular: Normal rate, regular rhythm, S1 normal, S2 normal, normal heart sounds, intact distal pulses and normal pulses.   Pulmonary/Chest: Effort normal and breath sounds normal. No stridor. No tachypnea and no bradypnea. No respiratory distress. " She has no decreased breath sounds. She has no wheezes. She has no rhonchi. She has no rales.   Abdominal: Soft. Normal appearance and bowel sounds are normal. She exhibits no distension and no abdominal bruit. There is no tenderness. There is no guarding and no CVA tenderness.   Musculoskeletal: She exhibits no edema, tenderness or deformity.        Right foot: There is normal range of motion.        Left foot: There is normal range of motion.   Lymphadenopathy:     She has no cervical adenopathy.   Neurological: She is alert and oriented to person, place, and time. She has normal strength. She exhibits normal muscle tone.   Skin: Skin is warm, dry and intact. Capillary refill takes less than 2 seconds. No abrasion, no laceration, no lesion and no rash noted. She is not diaphoretic. No cyanosis. Nails show no clubbing.   Psychiatric: She has a normal mood and affect. Her speech is normal and behavior is normal. Judgment and thought content normal. Cognition and memory are normal.       Assessment:       1. Abdominal aortic aneurysm (AAA) without rupture    2. Hx of pancreatitis    3. Essential hypertension    4. Chronic pain syndrome    5. Heart disease    6. B12 deficiency        Plan:       Abdominal aortic aneurysm (AAA) without rupture  -     Ambulatory referral to Cardiology    Hx of pancreatitis  -     Ambulatory referral to Gastroenterology    Essential hypertension  -     Ambulatory referral to Cardiology    Chronic pain syndrome    Heart disease  -     Ambulatory referral to Cardiology    B12 deficiency  -     cyanocobalamin 1,000 mcg/mL injection; Inject 1 mL (1,000 mcg total) into the muscle every 14 (fourteen) days. Take 1 injection every other week for 6 doses  Dispense: 1 mL; Refill: 2          PLAN:  - Discussed with patient the plan of care  - Medications reviewed. Medication side effects discussed. Patient has no questions or concerns at this time. Informed patient to notify me regarding any  concerns.   - Continue monitoring BP  - Referrals resent; patient to notify me if no appts made  - Patient has appt with Dr Aragon and neurosurgery upcoming     - Informed patient to please notify me with any questions or concerns at anytime  - Follow up ordered for 3 months      Risks, benefits, and side effects were discussed with the patient. All questions were answered to the fullest satisfaction of the patient, and pt verbalized understanding and agreement to treatment plan. Pt was to call with any new or worsening symptoms, or present to the ER.

## 2019-05-07 NOTE — LETTER
May 8, 2019    Alessia Nelson  [address] [place] [address]             [facility] [place] [facility] [address] [place] [address]  Phone: [phone]  Fax: [phone] To Whom It May Concern:         Ms. Alessia Nelson is my patient, and has been under care at [facility]. I am familiar with her history, functional limitations, and current diagnoses. In order for her to help cope and alleviate some of her symptoms, I have encouraged and agreed for her to obtain a pet for emotional support. The presence of this animal is necessary for her emotional/mental health because its presence will mitigate the symptoms she is currently experiencing.      Please allow Ms. Nelson to be accompanied by her emotional support animal.        If you have any questions or concerns, please don't hesitate to call.     Sincerely,           Cheryl Bradley NP

## 2019-05-13 ENCOUNTER — OFFICE VISIT (OUTPATIENT)
Dept: FAMILY MEDICINE | Facility: CLINIC | Age: 64
End: 2019-05-13
Payer: MEDICARE

## 2019-05-13 ENCOUNTER — TELEPHONE (OUTPATIENT)
Dept: GASTROENTEROLOGY | Facility: CLINIC | Age: 64
End: 2019-05-13

## 2019-05-13 VITALS
HEART RATE: 61 BPM | HEIGHT: 67 IN | RESPIRATION RATE: 18 BRPM | TEMPERATURE: 99 F | OXYGEN SATURATION: 97 % | BODY MASS INDEX: 29.61 KG/M2 | WEIGHT: 188.63 LBS | DIASTOLIC BLOOD PRESSURE: 80 MMHG | SYSTOLIC BLOOD PRESSURE: 156 MMHG

## 2019-05-13 DIAGNOSIS — R55 SYNCOPE AND COLLAPSE: ICD-10-CM

## 2019-05-13 DIAGNOSIS — S00.83XA CONTUSION OF OTHER PART OF HEAD, INITIAL ENCOUNTER: ICD-10-CM
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
## 2019-05-13 PROCEDURE — 99999 PR PBB SHADOW E&M-EST. PATIENT-LVL III: ICD-10-PCS | Mod: PBBFAC,,, | Performed by: NURSE PRACTITIONER

## 2019-05-13 PROCEDURE — 3079F DIAST BP 80-89 MM HG: CPT | Mod: CPTII,S$GLB,, | Performed by: NURSE PRACTITIONER

## 2019-05-13 PROCEDURE — 3008F PR BODY MASS INDEX (BMI) DOCUMENTED: ICD-10-PCS | Mod: CPTII,S$GLB,, | Performed by: NURSE PRACTITIONER

## 2019-05-13 PROCEDURE — 99999 PR PBB SHADOW E&M-EST. PATIENT-LVL III: CPT | Mod: PBBFAC,,, | Performed by: NURSE PRACTITIONER

## 2019-05-13 PROCEDURE — 99213 PR OFFICE/OUTPT VISIT, EST, LEVL III, 20-29 MIN: ICD-10-PCS | Mod: S$GLB,,, | Performed by: NURSE PRACTITIONER

## 2019-05-13 PROCEDURE — 3077F SYST BP >= 140 MM HG: CPT | Mod: CPTII,S$GLB,, | Performed by: NURSE PRACTITIONER

## 2019-05-13 PROCEDURE — 3077F PR MOST RECENT SYSTOLIC BLOOD PRESSURE >= 140 MM HG: ICD-10-PCS | Mod: CPTII,S$GLB,, | Performed by: NURSE PRACTITIONER

## 2019-05-13 PROCEDURE — 99213 OFFICE O/P EST LOW 20 MIN: CPT | Mod: S$GLB,,, | Performed by: NURSE PRACTITIONER

## 2019-05-13 PROCEDURE — 3079F PR MOST RECENT DIASTOLIC BLOOD PRESSURE 80-89 MM HG: ICD-10-PCS | Mod: CPTII,S$GLB,, | Performed by: NURSE PRACTITIONER

## 2019-05-13 PROCEDURE — 3008F BODY MASS INDEX DOCD: CPT | Mod: CPTII,S$GLB,, | Performed by: NURSE PRACTITIONER

## 2019-05-13 NOTE — PROGRESS NOTES
"Subjective:       Patient ID: Alessia Nelson is a 63 y.o. female.    Chief Complaint: Fall (pt stated she fell saturday night, had a egg on the back of head head, swelling went down, she said she is in pain)    Alessia Nelson is a 63 year old female who presents to the clinic today after falling and hitting her head of Saturday. She reports she is unsure how she hit her head. She reports at night, she takes 1 pain pill and 1 muscle relaxer, but reports this is apart of her normal regimen. She reports she woke up and hit her head. She reports an "egg on the back side of her head." She reports it is sore to touch. She is currently on plavix. She denies any loss of consciousness, headache, blurred vision, hemiparesis, urinary changes/concerns. She denies cp, sob, n/v/d.     Review of Systems   Constitutional: Negative for activity change, appetite change, diaphoresis, fatigue and fever.   HENT: Negative for congestion, postnasal drip, sore throat and trouble swallowing.    Eyes: Negative for photophobia, pain, discharge, redness and visual disturbance.   Respiratory: Negative for apnea, cough, shortness of breath and wheezing.    Cardiovascular: Negative for chest pain, palpitations and leg swelling.   Gastrointestinal: Negative for abdominal pain, diarrhea, nausea and vomiting.   Genitourinary: Negative for difficulty urinating, frequency and urgency.   Musculoskeletal: Positive for myalgias. Negative for arthralgias, back pain, gait problem, neck pain and neck stiffness.   Skin: Negative for color change.   Neurological: Negative for dizziness, tremors, seizures, syncope, facial asymmetry, speech difficulty, weakness, light-headedness, numbness and headaches.   Hematological: Does not bruise/bleed easily.   Psychiatric/Behavioral: Negative for agitation, behavioral problems, confusion, decreased concentration, hallucinations, self-injury, sleep disturbance and suicidal ideas. The patient is not nervous/anxious.  " "        Reviewed family, medical, surgical, and social history.    Objective:      BP (!) 156/80 (BP Location: Right arm, Patient Position: Sitting, BP Method: Medium (Automatic))   Pulse 61   Temp 98.5 °F (36.9 °C) (Tympanic)   Resp 18   Ht 5' 7" (1.702 m)   Wt 85.5 kg (188 lb 9.6 oz)   SpO2 97%   BMI 29.54 kg/m²   Physical Exam   Constitutional: She is oriented to person, place, and time. She appears well-developed and well-nourished. She is active and cooperative. No distress.   HENT:   Head: Normocephalic and atraumatic.       Right Ear: Hearing, tympanic membrane, external ear and ear canal normal. No drainage. No decreased hearing is noted.   Left Ear: Hearing, tympanic membrane, external ear and ear canal normal. No drainage. No decreased hearing is noted.   Nose: Nose normal. No mucosal edema, rhinorrhea or nasal deformity. No epistaxis.   Mouth/Throat: Uvula is midline, oropharynx is clear and moist and mucous membranes are normal. No uvula swelling. No oropharyngeal exudate or posterior oropharyngeal erythema. No tonsillar exudate.   Eyes: Pupils are equal, round, and reactive to light. Conjunctivae, EOM and lids are normal. Right eye exhibits no discharge. Left eye exhibits no discharge.   Neck: Trachea normal and full passive range of motion without pain. No JVD present. No tracheal tenderness and no muscular tenderness present. Carotid bruit is not present. No edema and no erythema present. No thyroid mass and no thyromegaly present.   Cardiovascular: Normal rate, regular rhythm, S1 normal, S2 normal, normal heart sounds, intact distal pulses and normal pulses.   Pulmonary/Chest: Effort normal and breath sounds normal. No stridor. No tachypnea and no bradypnea. No respiratory distress. She has no decreased breath sounds. She has no wheezes. She has no rhonchi. She has no rales.   Abdominal: Soft. Normal appearance and bowel sounds are normal. She exhibits no distension and no abdominal bruit. " There is no tenderness. There is no guarding and no CVA tenderness.   Musculoskeletal: She exhibits no edema, tenderness or deformity.        Right foot: There is normal range of motion.        Left foot: There is normal range of motion.   Lymphadenopathy:     She has no cervical adenopathy.   Neurological: She is alert and oriented to person, place, and time. She has normal strength. She exhibits normal muscle tone.   Skin: Skin is warm, dry and intact. Capillary refill takes less than 2 seconds. No abrasion, no laceration, no lesion and no rash noted. She is not diaphoretic. No cyanosis. Nails show no clubbing.   Psychiatric: She has a normal mood and affect. Her speech is normal and behavior is normal. Judgment and thought content normal. Cognition and memory are normal.       Assessment:       1. Fall, initial encounter    2. Syncope and collapse     3. Contusion of other part of head, initial encounter        Plan:       Fall, initial encounter  -     US Carotid Bilateral; Future; Expected date: 05/13/2019    Syncope and collapse   -     US Carotid Bilateral; Future; Expected date: 05/13/2019    Contusion of other part of head, initial encounter            Patient is able to move all extremitites with Normal ROM. No memory loss. Pupils VISHNU. Alert oriented x3. Smiles and frowns without difficulty.   PLAN:  - Discussed with patient the plan of care  - Discussed ice to head for pain and swelling  - Discussed patient to notify me of any symptoms, changes, or concerns   - Medications reviewed. Medication side effects discussed. Patient has no questions or concerns at this time. Informed patient to notify me regarding any concerns.   - US cartoids to be scheduled and completed   - Informed patient to please notify me with any questions or concerns at anytime  - Follow up ordered PRN       Risks, benefits, and side effects were discussed with the patient. All questions were answered to the fullest satisfaction of  the patient, and pt verbalized understanding and agreement to treatment plan. Pt was to call with any new or worsening symptoms, or present to the ER.

## 2019-05-13 NOTE — TELEPHONE ENCOUNTER
----- Message from Manpreet Wallace sent at 5/13/2019 11:32 AM CDT -----  Type: Needs Medical Advice    Who Called:  Patient  Symptoms (please be specific):  Pancreatitis  How long has patient had these symptoms: A few weeks     Best Call Back Number: [phone]  Additional Information: Patient states that she would like to schedule as a New Patient but Epic would not allow.    Please call to advise.  Referred by Cheryl Bradley.

## 2019-05-13 NOTE — TELEPHONE ENCOUNTER
Pt came into office and scheduled her with Dr. Fraga. Pt verbalized understanding of appt date and time

## 2019-05-16 ENCOUNTER — INITIAL CONSULT (OUTPATIENT)
Dept: GASTROENTEROLOGY | Facility: CLINIC | Age: 64
End: 2019-05-16
Payer: MEDICARE

## 2019-05-16 ENCOUNTER — TELEPHONE (OUTPATIENT)
Dept: FAMILY MEDICINE | Facility: CLINIC | Age: 64
End: 2019-05-16

## 2019-05-16 ENCOUNTER — HOSPITAL ENCOUNTER (OUTPATIENT)
Dept: RADIOLOGY | Facility: HOSPITAL | Age: 64
Discharge: HOME | End: 2019-05-16
Attending: NURSE PRACTITIONER
Payer: MEDICARE

## 2019-05-16 VITALS
OXYGEN SATURATION: 96 % | DIASTOLIC BLOOD PRESSURE: 71 MMHG | BODY MASS INDEX: 29.66 KG/M2 | HEART RATE: 59 BPM | SYSTOLIC BLOOD PRESSURE: 154 MMHG | WEIGHT: 189 LBS | HEIGHT: 67 IN

## 2019-05-16 DIAGNOSIS — K29.70 GASTROESOPHAGITIS: Primary | ICD-10-CM

## 2019-05-16 DIAGNOSIS — R55 SYNCOPE AND COLLAPSE: ICD-10-CM

## 2019-05-16 DIAGNOSIS — K83.9 BILE DUCT ABNORMALITY: ICD-10-CM

## 2019-05-16 DIAGNOSIS — K21.9 GASTROESOPHAGEAL REFLUX DISEASE WITHOUT ESOPHAGITIS: ICD-10-CM

## 2019-05-16 DIAGNOSIS — K22.2 ESOPHAGEAL STRICTURE: ICD-10-CM
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
## 2019-05-16 DIAGNOSIS — S36.222D: ICD-10-CM

## 2019-05-16 DIAGNOSIS — E53.8 B12 DEFICIENCY: ICD-10-CM

## 2019-05-16 DIAGNOSIS — K20.90 GASTROESOPHAGITIS: Primary | ICD-10-CM

## 2019-05-16 DIAGNOSIS — K85.90 ACUTE PANCREATITIS, UNSPECIFIED COMPLICATION STATUS, UNSPECIFIED PANCREATITIS TYPE: ICD-10-CM

## 2019-05-16 PROBLEM — S36.222A: Status: ACTIVE | Noted: 2019-05-16

## 2019-05-16 PROCEDURE — 3077F PR MOST RECENT SYSTOLIC BLOOD PRESSURE >= 140 MM HG: ICD-10-PCS | Mod: CPTII,S$GLB,, | Performed by: INTERNAL MEDICINE

## 2019-05-16 PROCEDURE — 99999 PR PBB SHADOW E&M-EST. PATIENT-LVL IV: CPT | Mod: PBBFAC,,, | Performed by: INTERNAL MEDICINE

## 2019-05-16 PROCEDURE — 99999 PR PBB SHADOW E&M-EST. PATIENT-LVL IV: ICD-10-PCS | Mod: PBBFAC,,, | Performed by: INTERNAL MEDICINE

## 2019-05-16 PROCEDURE — 3008F PR BODY MASS INDEX (BMI) DOCUMENTED: ICD-10-PCS | Mod: CPTII,S$GLB,, | Performed by: INTERNAL MEDICINE

## 2019-05-16 PROCEDURE — 3008F BODY MASS INDEX DOCD: CPT | Mod: CPTII,S$GLB,, | Performed by: INTERNAL MEDICINE

## 2019-05-16 PROCEDURE — 99203 OFFICE O/P NEW LOW 30 MIN: CPT | Mod: S$GLB,,, | Performed by: INTERNAL MEDICINE

## 2019-05-16 PROCEDURE — 3078F PR MOST RECENT DIASTOLIC BLOOD PRESSURE < 80 MM HG: ICD-10-PCS | Mod: CPTII,S$GLB,, | Performed by: INTERNAL MEDICINE

## 2019-05-16 PROCEDURE — 3077F SYST BP >= 140 MM HG: CPT | Mod: CPTII,S$GLB,, | Performed by: INTERNAL MEDICINE

## 2019-05-16 PROCEDURE — 99203 PR OFFICE/OUTPT VISIT, NEW, LEVL III, 30-44 MIN: ICD-10-PCS | Mod: S$GLB,,, | Performed by: INTERNAL MEDICINE

## 2019-05-16 PROCEDURE — 93880 EXTRACRANIAL BILAT STUDY: CPT | Mod: TC,PN

## 2019-05-16 PROCEDURE — 93880 US CAROTID BILATERAL: ICD-10-PCS | Mod: 26,,, | Performed by: RADIOLOGY

## 2019-05-16 PROCEDURE — 3078F DIAST BP <80 MM HG: CPT | Mod: CPTII,S$GLB,, | Performed by: INTERNAL MEDICINE

## 2019-05-16 PROCEDURE — 93880 EXTRACRANIAL BILAT STUDY: CPT | Mod: 26,,, | Performed by: RADIOLOGY

## 2019-05-16 RX ORDER — BUPROPION HYDROCHLORIDE 150 MG/1
150 TABLET ORAL DAILY
Qty: 30 TABLET | Refills: 1 | Status: SHIPPED | OUTPATIENT
Start: 2019-05-16 | End: 2019-07-02 | Stop reason: SDUPTHER

## 2019-05-16 RX ORDER — CYANOCOBALAMIN 1000 UG/ML
1000 INJECTION, SOLUTION INTRAMUSCULAR; SUBCUTANEOUS
Qty: 6 ML | Refills: 2 | Status: SHIPPED | OUTPATIENT
Start: 2019-05-16 | End: 2019-07-26

## 2019-05-16 NOTE — PROGRESS NOTES
Subjective:       Patient ID: Alessia Nelson is a 63 y.o. female.    Chief Complaint: GI Problem    She went to the emergency room a week ago at which time she was found to have an abnormal pancreas consistent with pancreatitis and also an elevated lipase.  She developed severe abdominal pain.  The pain has decreased in severity.  In 1996 she developed severe abdominal pain and and was evaluated for cholecystitis and gallstones.  Apparently she passed a gallstone but had associated pancreatitis.  She has not had another episode of pancreatitis since then.  Last year she had an upper endoscopy showing an esophageal stricture and she was dilated.  She has occasional pyrosis and dysphagia but she denies acute obstruction hematemesis hematochezia jaundice or aspiration.  In 1986 she had time a small bowel injury and apparently had peritonitis.  She had 18 in of her tests intestine removed.  She then had another surgery and states that she has full of  Adhesions.  She states that she was told that she might have a short bowel syndrome and has been on the cholestyramine but the chronic diarrhea.  She has chronic pain syndrome and is being seen in the Pain Clinic.  She has a history of hyperparathyroidism and has had extensive surgery and also she has been told that she had thyroid nodules.  She was hospitalized a year ago for extensive arthritis consistent with gout in the left lower leg.  The allopurinol has resolved her symptoms.  She has a MEN type ! .  She has had parathyroid surgery and attempted placement in the left arm.  She has had a hysterectomy and ovarian tumors.      Allergies:  Review of patient's allergies indicates:   Allergen Reactions    Aspirin     Lortab [hydrocodone-acetaminophen] Itching    Phenergan [promethazine]     Promethazine hcl        Medications:    Current Outpatient Medications:     acetaminophen (TYLENOL) 325 MG tablet, Take 2 tablets (650 mg total) by mouth every 6 (six) hours  as needed (or equal to 101 degree F)., Disp: , Rfl:     allopurinol (ZYLOPRIM) 300 MG tablet, , Disp: , Rfl:     amlodipine (NORVASC) 5 MG tablet, Take 5 mg by mouth once daily., Disp: , Rfl:     amlodipine-benazepril 5-10 mg (LOTREL) 5-10 mg per capsule, , Disp: , Rfl:     clopidogrel (PLAVIX) 75 mg tablet, Take 1 tablet (75 mg total) by mouth once daily., Disp: 14 tablet, Rfl: 0    clopidogrel (PLAVIX) 75 mg tablet, Take 1 tablet (75 mg total) by mouth once daily., Disp: 90 tablet, Rfl: 3    colestipol (COLESTID) 1 gram Tab, , Disp: , Rfl:     cyanocobalamin 1,000 mcg/mL injection, Inject 1 mL (1,000 mcg total) into the muscle every 14 (fourteen) days. Take 1 injection every other week for 6 doses, Disp: 1 mL, Rfl: 2    escitalopram (LEXAPRO) 20 MG tablet, Take 20 mg by mouth once daily., Disp: , Rfl:     gabapentin (NEURONTIN) 400 MG capsule, , Disp: , Rfl:     gabapentin (NEURONTIN) 800 MG tablet, Take 400 mg by mouth 4 (four) times daily. , Disp: , Rfl:     glycopyrrolate (ROBINUL) 1 mg Tab, TK 1 T PO BID, Disp: 90 tablet, Rfl: 3    HYDROcodone-acetaminophen (NORCO) 7.5-325 mg per tablet, TK 1 T PO  BID PRN PAIN, Disp: , Rfl: 0    ipratropium-albuterol (COMBIVENT)  mcg/actuation inhaler, Inhale 1 puff into the lungs 4 (four) times daily. Rescue, Disp: 1 Package, Rfl: 0    levothyroxine (SYNTHROID) 50 MCG tablet, Take 50 mcg by mouth once daily., Disp: , Rfl:     methocarbamol (ROBAXIN) 750 MG Tab, , Disp: , Rfl:     metoprolol tartrate (LOPRESSOR) 100 MG tablet, Take 1 tablet (100 mg total) by mouth 2 (two) times daily., Disp: 90 tablet, Rfl: 3    ondansetron (ZOFRAN) 4 MG tablet, Take 8 mg by mouth every 8 (eight) hours., Disp: , Rfl:     pantoprazole (PROTONIX) 40 MG tablet, Take 40 mg by mouth once daily., Disp: , Rfl:     potassium chloride SA (K-DUR,KLOR-CON) 20 MEQ tablet, Take 1 tablet (20 mEq total) by mouth once daily., Disp: 30 tablet, Rfl: 3    traZODone (DESYREL) 100 MG  "tablet, Take 1 tablet (100 mg total) by mouth every evening., Disp: 30 tablet, Rfl: 2    Past Medical History:   Diagnosis Date    Acute pancreatitis     CHF (congestive heart failure)     COPD (chronic obstructive pulmonary disease)     Depression     Diverticulosis     GERD (gastroesophageal reflux disease)     Hypertension     Thyroid disease        Past Surgical History:   Procedure Laterality Date    ADRENAL GLAND SURGERY      CHOLECYSTECTOMY      COLONOSCOPY      HERNIA REPAIR      HYSTERECTOMY      INCISIONAL HERNIA REPAIR      instestine      PARATHYROID GLAND SURGERY      3 surgeries    UPPER GASTROINTESTINAL ENDOSCOPY           Review of Systems   Constitutional: Negative for appetite change, fever and unexpected weight change.   HENT: Positive for trouble swallowing.         No jaundice.   Eyes: Negative.    Respiratory: Negative for cough, shortness of breath and wheezing.         No Rales, Rhonchi, or Dyspnea.   Cardiovascular: Negative for chest pain.        She states her blood pressure is well controlled   Gastrointestinal: Positive for abdominal distention, abdominal pain and nausea. Negative for anal bleeding, blood in stool and constipation.        She has had surgery for an abdominal wall hernia and has "mesh".  She feels as if she has a bulging and she points to her right lower quadrant. She complains of nonspecific upper abdominal discomfort that is not associated with GI function   Genitourinary: Positive for difficulty urinating.   Musculoskeletal: Positive for arthralgias and back pain. Negative for neck pain.        She is followed in the Pain Clinic for the chronic pain syndrome with the back.  She has painful left hips and knees and hands   Skin: Negative for pallor and rash.   Neurological: Negative for dizziness, seizures, syncope, speech difficulty, weakness and numbness.   Hematological: Negative for adenopathy.   Psychiatric/Behavioral: Negative for confusion.     "   Objective:      Physical Exam   Constitutional: She is oriented to person, place, and time. She appears well-developed and well-nourished.   She is a well-nourished well-hydrated elderly overweight nonicteric white female   HENT:   Head: Normocephalic.   Eyes: Pupils are equal, round, and reactive to light. EOM are normal.   Neck: Normal range of motion. Neck supple. No tracheal deviation present. No thyromegaly present.   Cardiovascular: Normal rate, regular rhythm and normal heart sounds.   Pulmonary/Chest: Effort normal and breath sounds normal.   Abdominal: Soft. Bowel sounds are normal. She exhibits distension. She exhibits no mass. There is tenderness. There is no rebound and no guarding. A hernia is present.   The abdomen is above plane it is obese.  The multiple healed scars.  There appears to be a bulge in the right lower quadrant this associated with the mesh and as small hernia there is mild tenderness in the epigastrium.  Organomegaly derm is absent.  Bowel sounds are normal.   Musculoskeletal: Normal range of motion.   Lymphadenopathy:     She has no cervical adenopathy.   Neurological: She is alert and oriented to person, place, and time. No cranial nerve deficit.   Skin: Skin is warm and dry.   Psychiatric: She has a normal mood and affect. Her behavior is normal.   Vitals reviewed.        Plan:       There are no diagnoses linked to this encounter.

## 2019-05-16 NOTE — TELEPHONE ENCOUNTER
"Pt informed of US results- voiced understanding.     Pt states at her office visit on 05.07.19, she was told she would be prescribed Wellbutrin to stop smoking.     Pt is also requesting to change the number of B12 vials dispensed, as she usually receives 6 at a time.    Please advise, thank you.         ----- Message from Cheryl Bradley NP sent at 5/16/2019  1:35 PM CDT -----  Please let patient know " No ultrasound evidence to suggest stenosis."  Thanks    "

## 2019-05-16 NOTE — PATIENT INSTRUCTIONS
She will continue her current meds and diet.. Medical records have been requested. Xrays  Scheduled.  She will continue her reflux regimen current medications vitamins and minerals.  Additionally an MRCP and upper GI and barium swallow will be obtained. I will review her records and she probably will need to undergo esophageal dilatation.

## 2019-05-16 NOTE — LETTER
May 16, 2019      Cheryl Bradley, NP  [address]  [place] [address]           [facility]  [place] [facility]  [address]  [place] [address]  Phone: [phone]  Fax: [phone]          Patient: Alessia Nelson   MR Number: [id number]   YOB: 1955   Date of Visit: 5/16/2019       Dear Cheryl Bradley:    Thank you for referring Alessia Nelson to me for evaluation. Attached you will find relevant portions of my assessment and plan of care.    If you have questions, please do not hesitate to call me. I look forward to following Alessia Nelson along with you.    Sincerely,    Manpreet Fraga MD    Enclosure  CC:  No Recipients    If you would like to receive this communication electronically, please contact [email or web address] or [phone] to request more information on [facility] [employer] access.    For providers and/or their staff who would like to refer a patient to [facility], please contact us through our one-stop-shop provider referral line, [facility], at [phone].    If you feel you have received this communication in error or would no longer like to receive these types of communications, please e-mail [email or web address]

## 2019-05-21 ENCOUNTER — HOSPITAL ENCOUNTER (OUTPATIENT)
Dept: RADIOLOGY | Facility: HOSPITAL | Age: 64
Discharge: HOME | End: 2019-05-21
Attending: NEUROLOGICAL SURGERY
Payer: MEDICARE

## 2019-05-21 ENCOUNTER — HOSPITAL ENCOUNTER (OUTPATIENT)
Dept: RADIOLOGY | Facility: HOSPITAL | Age: 64
Discharge: HOME | End: 2019-05-21
Attending: INTERNAL MEDICINE
Payer: MEDICARE

## 2019-05-21 ENCOUNTER — OFFICE VISIT (OUTPATIENT)
Dept: PAIN MEDICINE | Facility: CLINIC | Age: 64
End: 2019-05-21
Payer: MEDICARE

## 2019-05-21 VITALS
DIASTOLIC BLOOD PRESSURE: 67 MMHG | TEMPERATURE: 98 F | SYSTOLIC BLOOD PRESSURE: 145 MMHG | WEIGHT: 185 LBS | BODY MASS INDEX: 29.03 KG/M2 | HEART RATE: 65 BPM | HEIGHT: 67 IN

## 2019-05-21 DIAGNOSIS — M51.36 DDD (DEGENERATIVE DISC DISEASE), LUMBAR: ICD-10-CM

## 2019-05-21 DIAGNOSIS — G89.4 CHRONIC PAIN DISORDER: Primary | ICD-10-CM

## 2019-05-21 DIAGNOSIS — S36.222D: ICD-10-CM

## 2019-05-21 DIAGNOSIS — M96.1 FAILED BACK SYNDROME: ICD-10-CM

## 2019-05-21 DIAGNOSIS — M53.3 SACROILIAC JOINT PAIN: ICD-10-CM

## 2019-05-21 DIAGNOSIS — K83.9 BILE DUCT ABNORMALITY: ICD-10-CM

## 2019-05-21 DIAGNOSIS — M48.061 LUMBAR FORAMINAL STENOSIS: ICD-10-CM

## 2019-05-21 DIAGNOSIS — Z98.1 HISTORY OF LUMBAR SPINAL FUSION: ICD-10-CM

## 2019-05-21 DIAGNOSIS — M47.817 SPONDYLOSIS OF LUMBOSACRAL REGION WITHOUT MYELOPATHY OR RADICULOPATHY: ICD-10-CM

## 2019-05-21 PROCEDURE — 3008F PR BODY MASS INDEX (BMI) DOCUMENTED: ICD-10-PCS | Mod: CPTII,S$GLB,, | Performed by: ANESTHESIOLOGY

## 2019-05-21 PROCEDURE — 3077F PR MOST RECENT SYSTOLIC BLOOD PRESSURE >= 140 MM HG: ICD-10-PCS | Mod: CPTII,S$GLB,, | Performed by: ANESTHESIOLOGY

## 2019-05-21 PROCEDURE — 76376 MRI ABDOMEN WITHOUT CONTRAST MRCP: ICD-10-PCS | Mod: 26,,, | Performed by: RADIOLOGY

## 2019-05-21 PROCEDURE — 74181 MRI ABDOMEN W/O CONTRAST: CPT | Mod: TC

## 2019-05-21 PROCEDURE — 72148 MRI LUMBAR SPINE W/O DYE: CPT | Mod: TC

## 2019-05-21 PROCEDURE — 3077F SYST BP >= 140 MM HG: CPT | Mod: CPTII,S$GLB,, | Performed by: ANESTHESIOLOGY

## 2019-05-21 PROCEDURE — 99999 PR PBB SHADOW E&M-EST. PATIENT-LVL IV: CPT | Mod: PBBFAC,,, | Performed by: ANESTHESIOLOGY

## 2019-05-21 PROCEDURE — 3008F BODY MASS INDEX DOCD: CPT | Mod: CPTII,S$GLB,, | Performed by: ANESTHESIOLOGY

## 2019-05-21 PROCEDURE — 74181 MRI ABDOMEN WITHOUT CONTRAST MRCP: ICD-10-PCS | Mod: 26,,, | Performed by: RADIOLOGY

## 2019-05-21 PROCEDURE — 76376 3D RENDER W/INTRP POSTPROCES: CPT | Mod: 26,,, | Performed by: RADIOLOGY

## 2019-05-21 PROCEDURE — 99999 PR PBB SHADOW E&M-EST. PATIENT-LVL IV: ICD-10-PCS | Mod: PBBFAC,,, | Performed by: ANESTHESIOLOGY

## 2019-05-21 PROCEDURE — 76376 3D RENDER W/INTRP POSTPROCES: CPT | Mod: TC

## 2019-05-21 PROCEDURE — 3078F DIAST BP <80 MM HG: CPT | Mod: CPTII,S$GLB,, | Performed by: ANESTHESIOLOGY

## 2019-05-21 PROCEDURE — 74181 MRI ABDOMEN W/O CONTRAST: CPT | Mod: 26,,, | Performed by: RADIOLOGY

## 2019-05-21 PROCEDURE — 3078F PR MOST RECENT DIASTOLIC BLOOD PRESSURE < 80 MM HG: ICD-10-PCS | Mod: CPTII,S$GLB,, | Performed by: ANESTHESIOLOGY

## 2019-05-21 PROCEDURE — 72148 MRI LUMBAR SPINE W/O DYE: CPT | Mod: 26,,, | Performed by: RADIOLOGY

## 2019-05-21 PROCEDURE — 99204 OFFICE O/P NEW MOD 45 MIN: CPT | Mod: S$GLB,,, | Performed by: ANESTHESIOLOGY

## 2019-05-21 PROCEDURE — 72148 MRI LUMBAR SPINE WITHOUT CONTRAST: ICD-10-PCS | Mod: 26,,, | Performed by: RADIOLOGY

## 2019-05-21 PROCEDURE — 99204 PR OFFICE/OUTPT VISIT, NEW, LEVL IV, 45-59 MIN: ICD-10-PCS | Mod: S$GLB,,, | Performed by: ANESTHESIOLOGY

## 2019-05-21 NOTE — LETTER
May 24, 2019      Cheryl Bradley, NP  [address] [facility] [place] [address]           [facility]  [address] [place] [address]  Phone: [phone]  Fax: [phone]          Patient: Alessia Nelson   MR Number: [id number]   YOB: 1955   Date of Visit: 5/21/2019       Dear Cheryl Bradley:    Thank you for referring Alessia Nelson to me for evaluation. Attached you will find relevant portions of my assessment and plan of care.    If you have questions, please do not hesitate to call me. I look forward to following Alessia Nelson along with you.    Sincerely,    Sneha Aragon MD    Enclosure  CC:  No Recipients    If you would like to receive this communication electronically, please contact [email or web address] or [phone] to request more information on [employer] access.    For providers and/or their staff who would like to refer a patient to [facility], please contact us through our one-stop-shop provider referral line, [facility], at [phone].    If you feel you have received this communication in error or would no longer like to receive these types of communications, please e-mail [email or web address]

## 2019-05-21 NOTE — PROGRESS NOTES
Subjective:     Patient ID: Alessia Nelson is a 63 y.o. female.    Chief Complaint: Pain    Consulted by: Lynne Parsons NP     Disclaimer: This note was generated using voice recognition software.  There may be a typographical errors that were missed during proofreading.      HPI:    Alessia Nelson is a 63 y.o. female who presents today with chronic low back pain.  She has a history of 2 lumbar surgeries in the past as well as a cervical surgery.  She reports bilateral low back pain that radiates into her right buttock and the posterior aspect of her right leg to her knee and sometimes to her calf.  She does have numbness in bilateral feet attributed to diabetic peripheral neuropathy. This is separate from her low back symptoms.   This pain is described in detail below.    Of note, she has had 4 falls, one from her BP dropping, one while she was asleep, and two others of unknown causes.    She carries a diagnosis of fibromyalgia, but she states that she does not believe she has this.    Aggravating factors:  Sitting, standing, walking, bending/twisting, lifting, exercise    Mitigating factors:  Rest, lying down, medication    Previously seeing: Dr. Cardozo, Kyler Watson PA-C, Dr. Brent Lewis    Physical Therapy:  Yes, just finished 2 weeks, she is doing the HEP (stretching) most days.  She is planning to get in her pool, but she has not cleaned it yet    Non-pharmacologic Treatment:     · Ice/Heat: Heat is helpful (her dog lies on her back)  · TENS: Tried at the chiropractor  · Massage: Yes, in the past, helpful  · Chiropractic care:  Yes, in the past, helpful  · Acupuncture: She is interested in trying this.  · Other: Uses a body pillow for sleep         Pain Medications:         · Currently taking: Tylenol 3159-5706 BID PRN (takes it for before the Norco), Norco 7.5/325 mg BID PRN (she tries to limit this as much as possible), methocarbamol 750 mg QHS, gabapentin 400 mg BID, trazodone 100 mg QHS,  "Wellbutrin, Lexapro    · Has tried in the past:    · Opioids: Oxycodone (doesn't like)  · NSAIDS: Celebrex (caused diarrhea)  · Tylenol: Tylenol 4439-8880   · Muscle relaxants: tizanidine (caused SE of not remembering and seeing things)  · TCAs: Recently stopped amitriptyline  · SNRIs: Not tried  · Anticonvulsants: On gabapentin  · topical creams: Lotion for nighttime, bath oil  · Other: Lorazepam (stopped this)    Blood thinners: Plavix for     Interventional Therapies:   · L4/5 Epidural steroid injection: For many years.  She is unsure of the last one  · Right SI joint: No benefit    Relevant Surgeries:   · 2006: Lumbar surgery  · 2007: Lumbar surgery  · 2012: Lumbar surgery, PISF  · 2006: Cervical surgery    Affecting sleep? Yes, she cannot sleep due to "brain not turning off"    Affecting daily activities? Yes, she is unable to mop, cook, or do most activities    Depressive symptoms? Yes, she has been diagnosed with depression          · SI/HI? No    Work status: Criminal Justice, disabled due to pain    Prescription Monitoring Program database:  Reviewed and consistent with medication use as prescribed.    Last 3 PDI Scores 5/21/2019   Pain Disability Index (PDI) 41       Opioid Risk Score       Value Time User    Opioid Risk Score  4 5/21/2019  3:57 PM Clare Alfonso MA          GENERAL:  Positive for fatigue.  No weight loss, malaise or fevers.  HEENT:   No recent changes in vision or hearing  NECK:  Negative for lumps, no difficulty with swallowing.  RESPIRATORY:  Positive for cough.  Negative for wheezing or shortness of breath, patient denies any recent URI.  CARDIOVASCULAR:  Negative for chest pain, leg swelling or palpitations.  GI:  Positive for diarrhea.  Negative for abdominal discomfort, blood in stools or black stools or change in bowel habits.  MUSCULOSKELETAL:  See HPI.  SKIN:  Positive for color change, texture change.  Negative for lesions, rash, and itching.  PSYCH:  Positive for " anxiety.  No other mood disorder or recent psychosocial stressors.    HEMATOLOGY/LYMPHOLOGY:  Positive for easy bruising.  Negative for prolonged bleeding or swollen nodes.    ENDO:  Positive for hypothyroidism and diabetes.    NEURO:   Positive for headaches, head trauma (due to recent fall), loss of balance, difficulty sleeping.  No history of syncope, paralysis, seizures or tremors.  All other reviewed and negative other than HPI.          Past Medical History:   Diagnosis Date    Acute pancreatitis     CHF (congestive heart failure)     COPD (chronic obstructive pulmonary disease)     Depression     Diverticulosis     GERD (gastroesophageal reflux disease)     Hypertension     Thyroid disease        Past Surgical History:   Procedure Laterality Date    ADRENAL GLAND SURGERY      CHOLECYSTECTOMY      COLONOSCOPY      HERNIA REPAIR      HYSTERECTOMY      INCISIONAL HERNIA REPAIR      instestine      PARATHYROID GLAND SURGERY      3 surgeries    UPPER GASTROINTESTINAL ENDOSCOPY         Review of patient's allergies indicates:   Allergen Reactions    Aspirin     Lortab [hydrocodone-acetaminophen] Itching    Phenergan [promethazine]     Promethazine hcl        Current Outpatient Medications   Medication Sig Dispense Refill    acetaminophen (TYLENOL) 325 MG tablet Take 2 tablets (650 mg total) by mouth every 6 (six) hours as needed (or equal to 101 degree F).      allopurinol (ZYLOPRIM) 300 MG tablet       amlodipine (NORVASC) 5 MG tablet Take 5 mg by mouth once daily.      amlodipine-benazepril 5-10 mg (LOTREL) 5-10 mg per capsule       buPROPion (WELLBUTRIN XL) 150 MG TB24 tablet Take 1 tablet (150 mg total) by mouth once daily. 30 tablet 1    clopidogrel (PLAVIX) 75 mg tablet Take 1 tablet (75 mg total) by mouth once daily. 14 tablet 0    clopidogrel (PLAVIX) 75 mg tablet Take 1 tablet (75 mg total) by mouth once daily. 90 tablet 3    colestipol (COLESTID) 1 gram Tab        cyanocobalamin 1,000 mcg/mL injection Inject 1 mL (1,000 mcg total) into the muscle every 14 (fourteen) days. Take 1 injection every other week for 6 doses 6 mL 2    escitalopram (LEXAPRO) 20 MG tablet Take 20 mg by mouth once daily.      gabapentin (NEURONTIN) 400 MG capsule       gabapentin (NEURONTIN) 800 MG tablet Take 400 mg by mouth 4 (four) times daily.       glycopyrrolate (ROBINUL) 1 mg Tab TK 1 T PO BID 90 tablet 3    HYDROcodone-acetaminophen (NORCO) 7.5-325 mg per tablet TK 1 T PO  BID PRN PAIN  0    ipratropium-albuterol (COMBIVENT)  mcg/actuation inhaler Inhale 1 puff into the lungs 4 (four) times daily. Rescue 1 Package 0    levothyroxine (SYNTHROID) 50 MCG tablet Take 50 mcg by mouth once daily.      methocarbamol (ROBAXIN) 750 MG Tab       metoprolol tartrate (LOPRESSOR) 100 MG tablet Take 1 tablet (100 mg total) by mouth 2 (two) times daily. 90 tablet 3    ondansetron (ZOFRAN) 4 MG tablet Take 8 mg by mouth every 8 (eight) hours.      pantoprazole (PROTONIX) 40 MG tablet Take 40 mg by mouth once daily.      potassium chloride SA (K-DUR,KLOR-CON) 20 MEQ tablet Take 1 tablet (20 mEq total) by mouth once daily. 30 tablet 3    traZODone (DESYREL) 100 MG tablet Take 1 tablet (100 mg total) by mouth every evening. 30 tablet 2     No current facility-administered medications for this visit.        Family History   Problem Relation Age of Onset    Stroke Maternal Grandmother     Cancer Maternal Grandfather     Stroke Mother     Heart disease Father        Social History     Socioeconomic History    Marital status: Single     Spouse name: Not on file    Number of children: 0    Years of education: Not on file    Highest education level: Not on file   Occupational History    Not on file   Social Needs    Financial resource strain: Not on file    Food insecurity:     Worry: Not on file     Inability: Not on file    Transportation needs:     Medical: Not on file     Non-medical:  "Not on file   Tobacco Use    Smoking status: Current Every Day Smoker     Packs/day: 1.00     Years: 15.00     Pack years: 15.00    Smokeless tobacco: Never Used   Substance and Sexual Activity    Alcohol use: No    Drug use: No    Sexual activity: Not Currently   Lifestyle    Physical activity:     Days per week: Not on file     Minutes per session: Not on file    Stress: Not on file   Relationships    Social connections:     Talks on phone: Not on file     Gets together: Not on file     Attends Sabianist service: Not on file     Active member of club or organization: Not on file     Attends meetings of clubs or organizations: Not on file     Relationship status: Not on file   Other Topics Concern    Not on file   Social History Narrative    Not on file       Objective:     Vitals:    05/21/19 1555   BP: (!) 145/67   Pulse: 65   Temp: 98 °F (36.7 °C)   TempSrc: Oral   Weight: 83.9 kg (185 lb)   Height: 5' 7" (1.702 m)   PainSc:   6       GEN:  Well developed, well nourished.  No acute distress. No pain behavior.  HEENT:  No trauma.  Mucous membranes moist.  Nares patent bilaterally.  PSYCH: Normal affect. Thought content appropriate.  CHEST:  Breathing symmetric.  No audible wheezing.  ABD: Soft, non-distended.  SKIN:  Warm, pink, dry.  No rash on exposed areas.    EXT:  No cyanosis, clubbing, or edema.  No color change or changes in nail or hair growth.  NEURO/MUSCULOSKELETAL:  Fully alert, oriented, and appropriate. Speech normal kevin. No cranial nerve deficits.   Gait: Antalgic.  Present trendelenburg sign bilaterally.   Motor Strength: 5/5 motor strength throughout lower extremities.   Sensory: Decreased sensation in bilateral soles of her feet equally.  Increased sensation in a right L5.   Reflexes:  1+ and symmetric throughout.  Downgoing Babinski's bilaterally.  No clonus or spasticity.  L-Spine:  Decreased ROM with pain on flexion. Minimal pain with axial/facet loading bilaterally.  Positive " SLR on the left for pain in an S1  SI Joint/Hip: Positive MIKEY on the right.  Negative MIKEY on the left and FADIR bilaterally.  TTP over lumbar paraspinals, right SI joint       Imaging:      The imaging studies listed below were independently reviewed by me, and I agree with the findings as documented below.     MRIs have been done today, but the reports are not yet available.  Pre for review of the MRI of the lumbar spine shows posterior instrumented spinal fusion at L4/5 with some residual degenerative disc disease at that level with adjacent endplate edema at L4 and L5. Otherwise, the MRI looks largely unremarkable.      Assessment:     Encounter Diagnoses   Name Primary?    Chronic pain disorder Yes    DDD (degenerative disc disease), lumbar     History of lumbar spinal fusion     Sacroiliac joint pain     Spondylosis of lumbosacral region without myelopathy or radiculopathy        Plan:     Alessia was seen today for back pain.    Diagnoses and all orders for this visit:    Chronic pain disorder    DDD (degenerative disc disease), lumbar    History of lumbar spinal fusion    Sacroiliac joint pain    Spondylosis of lumbosacral region without myelopathy or radiculopathy  -     Case Request Operating Room: RIGHT L5-S3 MEDIAL BRANCH BLOCKS     She presents today with a longstanding chronic pain history with several recent medical issues that are complicating the picture.  We will work to control her pain with as few sedating medications as possible.  It is reasonable to think that these may have had something to do with her falls given her symptoms of amnesia surrounding the event, lightheadedness, and confusion.  Today, her primary pain appears to be coming from her right SI joint      We discussed the assessment and recommendations.  All available images were reviewed. We discussed the disease process, prognosis, treatment plan, and risks and benefits. The patient is aware of the risks and benefits of the  medications being prescribed, common side effects, and proper usage. The following is the plan we agreed on:     1. Schedule for right L5-S3 medial branch blocks  1. If positive, RFA.  She will need to stop her Plavix for least 5 days for this  2. If negative, right L5 and S1 transforaminal ANTHONY.  She will need to stop her Plavix for this  2. We will re-evaluate medications at next visit.  She has another prescription for hydrocodone from her previous pain management provider.  If I were to take over her medications, we will work to minimize these as much as possible, with the goal being to taper off.  However, with her multiple medical conditions, this may not be fully possible.  However, as above, we will work to minimize sedating medications when possible  3. MISSAEL from [facility]  4. We had an extensive discussion regarding the expected time course for PT for chronic pain vs after a surgery.  Specifically, we discussed that PT for chronic pain almost always causes a worsening of pain before any improvement, which is generally not seen for 3-6 months, in which time she will likely already be home doing home exercises given that a formal course of PT generally lasts anywhere from 6-12 weeks.  5. She would be a good candidate for FRP in the future  6. RTC in 3-6 weeks or sooner if needed.    Thank you for allowing me to participate in the care of this patient.   Please do not hesitate to call me at [phone] with any questions or concerns.    Sneha Aragon MD  05/21/2019     The above plan and management options were discussed at length with patient. Patient is in agreement with the above and verbalized understanding. It will be communicated with the referring physician via electronic record, fax, or mail.

## 2019-05-21 NOTE — H&P (VIEW-ONLY)
Subjective:     Patient ID: Alessia Nelson is a 63 y.o. female.    Chief Complaint: Pain    Consulted by: Lynne Parsons NP     Disclaimer: This note was generated using voice recognition software.  There may be a typographical errors that were missed during proofreading.      HPI:    Alessia Nelson is a 63 y.o. female who presents today with chronic low back pain.  She has a history of 2 lumbar surgeries in the past as well as a cervical surgery.  She reports bilateral low back pain that radiates into her right buttock and the posterior aspect of her right leg to her knee and sometimes to her calf.  She does have numbness in bilateral feet attributed to diabetic peripheral neuropathy. This is separate from her low back symptoms.   This pain is described in detail below.    Of note, she has had 4 falls, one from her BP dropping, one while she was asleep, and two others of unknown causes.    She carries a diagnosis of fibromyalgia, but she states that she does not believe she has this.    Aggravating factors:  Sitting, standing, walking, bending/twisting, lifting, exercise    Mitigating factors:  Rest, lying down, medication    Previously seeing: Dr. Cardozo, Kyler Watson PA-C, Dr. Brent Lewis    Physical Therapy:  Yes, just finished 2 weeks, she is doing the HEP (stretching) most days.  She is planning to get in her pool, but she has not cleaned it yet    Non-pharmacologic Treatment:     · Ice/Heat: Heat is helpful (her dog lies on her back)  · TENS: Tried at the chiropractor  · Massage: Yes, in the past, helpful  · Chiropractic care:  Yes, in the past, helpful  · Acupuncture: She is interested in trying this.  · Other: Uses a body pillow for sleep         Pain Medications:         · Currently taking: Tylenol 3102-9954 BID PRN (takes it for before the Norco), Norco 7.5/325 mg BID PRN (she tries to limit this as much as possible), methocarbamol 750 mg QHS, gabapentin 400 mg BID, trazodone 100 mg QHS,  "Wellbutrin, Lexapro    · Has tried in the past:    · Opioids: Oxycodone (doesn't like)  · NSAIDS: Celebrex (caused diarrhea)  · Tylenol: Tylenol 9067-6040   · Muscle relaxants: tizanidine (caused SE of not remembering and seeing things)  · TCAs: Recently stopped amitriptyline  · SNRIs: Not tried  · Anticonvulsants: On gabapentin  · topical creams: Lotion for nighttime, bath oil  · Other: Lorazepam (stopped this)    Blood thinners: Plavix for     Interventional Therapies:   · L4/5 Epidural steroid injection: For many years.  She is unsure of the last one  · Right SI joint: No benefit    Relevant Surgeries:   · 2006: Lumbar surgery  · 2007: Lumbar surgery  · 2012: Lumbar surgery, PISF  · 2006: Cervical surgery    Affecting sleep? Yes, she cannot sleep due to "brain not turning off"    Affecting daily activities? Yes, she is unable to mop, cook, or do most activities    Depressive symptoms? Yes, she has been diagnosed with depression          · SI/HI? No    Work status: Criminal Justice, disabled due to pain    Prescription Monitoring Program database:  Reviewed and consistent with medication use as prescribed.    Last 3 PDI Scores 5/21/2019   Pain Disability Index (PDI) 41       Opioid Risk Score       Value Time User    Opioid Risk Score  4 5/21/2019  3:57 PM Clare Alfonso MA          GENERAL:  Positive for fatigue.  No weight loss, malaise or fevers.  HEENT:   No recent changes in vision or hearing  NECK:  Negative for lumps, no difficulty with swallowing.  RESPIRATORY:  Positive for cough.  Negative for wheezing or shortness of breath, patient denies any recent URI.  CARDIOVASCULAR:  Negative for chest pain, leg swelling or palpitations.  GI:  Positive for diarrhea.  Negative for abdominal discomfort, blood in stools or black stools or change in bowel habits.  MUSCULOSKELETAL:  See HPI.  SKIN:  Positive for color change, texture change.  Negative for lesions, rash, and itching.  PSYCH:  Positive for " anxiety.  No other mood disorder or recent psychosocial stressors.    HEMATOLOGY/LYMPHOLOGY:  Positive for easy bruising.  Negative for prolonged bleeding or swollen nodes.    ENDO:  Positive for hypothyroidism and diabetes.    NEURO:   Positive for headaches, head trauma (due to recent fall), loss of balance, difficulty sleeping.  No history of syncope, paralysis, seizures or tremors.  All other reviewed and negative other than HPI.          Past Medical History:   Diagnosis Date    Acute pancreatitis     CHF (congestive heart failure)     COPD (chronic obstructive pulmonary disease)     Depression     Diverticulosis     GERD (gastroesophageal reflux disease)     Hypertension     Thyroid disease        Past Surgical History:   Procedure Laterality Date    ADRENAL GLAND SURGERY      CHOLECYSTECTOMY      COLONOSCOPY      HERNIA REPAIR      HYSTERECTOMY      INCISIONAL HERNIA REPAIR      instestine      PARATHYROID GLAND SURGERY      3 surgeries    UPPER GASTROINTESTINAL ENDOSCOPY         Review of patient's allergies indicates:   Allergen Reactions    Aspirin     Lortab [hydrocodone-acetaminophen] Itching    Phenergan [promethazine]     Promethazine hcl        Current Outpatient Medications   Medication Sig Dispense Refill    acetaminophen (TYLENOL) 325 MG tablet Take 2 tablets (650 mg total) by mouth every 6 (six) hours as needed (or equal to 101 degree F).      allopurinol (ZYLOPRIM) 300 MG tablet       amlodipine (NORVASC) 5 MG tablet Take 5 mg by mouth once daily.      amlodipine-benazepril 5-10 mg (LOTREL) 5-10 mg per capsule       buPROPion (WELLBUTRIN XL) 150 MG TB24 tablet Take 1 tablet (150 mg total) by mouth once daily. 30 tablet 1    clopidogrel (PLAVIX) 75 mg tablet Take 1 tablet (75 mg total) by mouth once daily. 14 tablet 0    clopidogrel (PLAVIX) 75 mg tablet Take 1 tablet (75 mg total) by mouth once daily. 90 tablet 3    colestipol (COLESTID) 1 gram Tab        cyanocobalamin 1,000 mcg/mL injection Inject 1 mL (1,000 mcg total) into the muscle every 14 (fourteen) days. Take 1 injection every other week for 6 doses 6 mL 2    escitalopram (LEXAPRO) 20 MG tablet Take 20 mg by mouth once daily.      gabapentin (NEURONTIN) 400 MG capsule       gabapentin (NEURONTIN) 800 MG tablet Take 400 mg by mouth 4 (four) times daily.       glycopyrrolate (ROBINUL) 1 mg Tab TK 1 T PO BID 90 tablet 3    HYDROcodone-acetaminophen (NORCO) 7.5-325 mg per tablet TK 1 T PO  BID PRN PAIN  0    ipratropium-albuterol (COMBIVENT)  mcg/actuation inhaler Inhale 1 puff into the lungs 4 (four) times daily. Rescue 1 Package 0    levothyroxine (SYNTHROID) 50 MCG tablet Take 50 mcg by mouth once daily.      methocarbamol (ROBAXIN) 750 MG Tab       metoprolol tartrate (LOPRESSOR) 100 MG tablet Take 1 tablet (100 mg total) by mouth 2 (two) times daily. 90 tablet 3    ondansetron (ZOFRAN) 4 MG tablet Take 8 mg by mouth every 8 (eight) hours.      pantoprazole (PROTONIX) 40 MG tablet Take 40 mg by mouth once daily.      potassium chloride SA (K-DUR,KLOR-CON) 20 MEQ tablet Take 1 tablet (20 mEq total) by mouth once daily. 30 tablet 3    traZODone (DESYREL) 100 MG tablet Take 1 tablet (100 mg total) by mouth every evening. 30 tablet 2     No current facility-administered medications for this visit.        Family History   Problem Relation Age of Onset    Stroke Maternal Grandmother     Cancer Maternal Grandfather     Stroke Mother     Heart disease Father        Social History     Socioeconomic History    Marital status: Single     Spouse name: Not on file    Number of children: 0    Years of education: Not on file    Highest education level: Not on file   Occupational History    Not on file   Social Needs    Financial resource strain: Not on file    Food insecurity:     Worry: Not on file     Inability: Not on file    Transportation needs:     Medical: Not on file     Non-medical:  "Not on file   Tobacco Use    Smoking status: Current Every Day Smoker     Packs/day: 1.00     Years: 15.00     Pack years: 15.00    Smokeless tobacco: Never Used   Substance and Sexual Activity    Alcohol use: No    Drug use: No    Sexual activity: Not Currently   Lifestyle    Physical activity:     Days per week: Not on file     Minutes per session: Not on file    Stress: Not on file   Relationships    Social connections:     Talks on phone: Not on file     Gets together: Not on file     Attends [employer] service: Not on file     Active member of club or organization: Not on file     Attends meetings of clubs or organizations: Not on file     Relationship status: Not on file   Other Topics Concern    Not on file   Social History Narrative    Not on file       Objective:     Vitals:    05/21/19 1555   BP: (!) 145/67   Pulse: 65   Temp: 98 °F (36.7 °C)   TempSrc: Oral   Weight: 83.9 kg (185 lb)   Height: 5' 7" (1.702 m)   PainSc:   6       GEN:  Well developed, well nourished.  No acute distress. No pain behavior.  HEENT:  No trauma.  Mucous membranes moist.  Nares patent bilaterally.  PSYCH: Normal affect. Thought content appropriate.  CHEST:  Breathing symmetric.  No audible wheezing.  ABD: Soft, non-distended.  SKIN:  Warm, pink, dry.  No rash on exposed areas.    EXT:  No cyanosis, clubbing, or edema.  No color change or changes in nail or hair growth.  NEURO/MUSCULOSKELETAL:  Fully alert, oriented, and appropriate. Speech normal kevin. No cranial nerve deficits.   Gait: Antalgic.  Present trendelenburg sign bilaterally.   Motor Strength: 5/5 motor strength throughout lower extremities.   Sensory: Decreased sensation in bilateral soles of her feet equally.  Increased sensation in a right L5.   Reflexes:  1+ and symmetric throughout.  Downgoing Babinski's bilaterally.  No clonus or spasticity.  L-Spine:  Decreased ROM with pain on flexion. Minimal pain with axial/facet loading bilaterally.  Positive " SLR on the left for pain in an S1  SI Joint/Hip: Positive MIKEY on the right.  Negative MIKEY on the left and FADIR bilaterally.  TTP over lumbar paraspinals, right SI joint       Imaging:      The imaging studies listed below were independently reviewed by me, and I agree with the findings as documented below.     MRIs have been done today, but the reports are not yet available.  Pre for review of the MRI of the lumbar spine shows posterior instrumented spinal fusion at L4/5 with some residual degenerative disc disease at that level with adjacent endplate edema at L4 and L5. Otherwise, the MRI looks largely unremarkable.      Assessment:     Encounter Diagnoses   Name Primary?    Chronic pain disorder Yes    DDD (degenerative disc disease), lumbar     History of lumbar spinal fusion     Sacroiliac joint pain     Spondylosis of lumbosacral region without myelopathy or radiculopathy        Plan:     Alessia was seen today for back pain.    Diagnoses and all orders for this visit:    Chronic pain disorder    DDD (degenerative disc disease), lumbar    History of lumbar spinal fusion    Sacroiliac joint pain    Spondylosis of lumbosacral region without myelopathy or radiculopathy  -     Case Request Operating Room: RIGHT L5-S3 MEDIAL BRANCH BLOCKS     She presents today with a longstanding chronic pain history with several recent medical issues that are complicating the picture.  We will work to control her pain with as few sedating medications as possible.  It is reasonable to think that these may have had something to do with her falls given her symptoms of amnesia surrounding the event, lightheadedness, and confusion.  Today, her primary pain appears to be coming from her right SI joint      We discussed the assessment and recommendations.  All available images were reviewed. We discussed the disease process, prognosis, treatment plan, and risks and benefits. The patient is aware of the risks and benefits of the  medications being prescribed, common side effects, and proper usage. The following is the plan we agreed on:     1. Schedule for right L5-S3 medial branch blocks  1. If positive, RFA.  She will need to stop her Plavix for least 5 days for this  2. If negative, right L5 and S1 transforaminal ANTHONY.  She will need to stop her Plavix for this  2. We will re-evaluate medications at next visit.  She has another prescription for hydrocodone from her previous pain management provider.  If I were to take over her medications, we will work to minimize these as much as possible, with the goal being to taper off.  However, with her multiple medical conditions, this may not be fully possible.  However, as above, we will work to minimize sedating medications when possible  3. MISSAEL from [facility]  4. We had an extensive discussion regarding the expected time course for PT for chronic pain vs after a surgery.  Specifically, we discussed that PT for chronic pain almost always causes a worsening of pain before any improvement, which is generally not seen for 3-6 months, in which time she will likely already be home doing home exercises given that a formal course of PT generally lasts anywhere from 6-12 weeks.  5. She would be a good candidate for FRP in the future  6. RTC in 3-6 weeks or sooner if needed.    Thank you for allowing me to participate in the care of this patient.   Please do not hesitate to call me at [phone] with any questions or concerns.    Sneha Aragon MD  05/21/2019     The above plan and management options were discussed at length with patient. Patient is in agreement with the above and verbalized understanding. It will be communicated with the referring physician via electronic record, fax, or mail.

## 2019-05-21 NOTE — H&P (VIEW-ONLY)
Subjective:     Patient ID: Alessia Nelson is a 63 y.o. female.    Chief Complaint: Pain    Consulted by: Lynne Parsons NP     Disclaimer: This note was generated using voice recognition software.  There may be a typographical errors that were missed during proofreading.      HPI:    Alessia Nelson is a 63 y.o. female who presents today with chronic low back pain.  She has a history of 2 lumbar surgeries in the past as well as a cervical surgery.  She reports bilateral low back pain that radiates into her right buttock and the posterior aspect of her right leg to her knee and sometimes to her calf.  She does have numbness in bilateral feet attributed to diabetic peripheral neuropathy. This is separate from her low back symptoms.   This pain is described in detail below.    Of note, she has had 4 falls, one from her BP dropping, one while she was asleep, and two others of unknown causes.    She carries a diagnosis of fibromyalgia, but she states that she does not believe she has this.    Aggravating factors:  Sitting, standing, walking, bending/twisting, lifting, exercise    Mitigating factors:  Rest, lying down, medication    Previously seeing: Dr. Cardozo, Kyler Watson PA-C, Dr. Brent Lewis    Physical Therapy:  Yes, just finished 2 weeks, she is doing the HEP (stretching) most days.  She is planning to get in her pool, but she has not cleaned it yet    Non-pharmacologic Treatment:     · Ice/Heat: Heat is helpful (her dog lies on her back)  · TENS: Tried at the chiropractor  · Massage: Yes, in the past, helpful  · Chiropractic care:  Yes, in the past, helpful  · Acupuncture: She is interested in trying this.  · Other: Uses a body pillow for sleep         Pain Medications:         · Currently taking: Tylenol 9243-1281 BID PRN (takes it for before the Norco), Norco 7.5/325 mg BID PRN (she tries to limit this as much as possible), methocarbamol 750 mg QHS, gabapentin 400 mg BID, trazodone 100 mg QHS,  "Wellbutrin, Lexapro    · Has tried in the past:    · Opioids: Oxycodone (doesn't like)  · NSAIDS: Celebrex (caused diarrhea)  · Tylenol: Tylenol 8312-3504   · Muscle relaxants: tizanidine (caused SE of not remembering and seeing things)  · TCAs: Recently stopped amitriptyline  · SNRIs: Not tried  · Anticonvulsants: On gabapentin  · topical creams: Lotion for nighttime, bath oil  · Other: Lorazepam (stopped this)    Blood thinners: Plavix for     Interventional Therapies:   · L4/5 Epidural steroid injection: For many years.  She is unsure of the last one  · Right SI joint: No benefit    Relevant Surgeries:   · 2006: Lumbar surgery  · 2007: Lumbar surgery  · 2012: Lumbar surgery, PISF  · 2006: Cervical surgery    Affecting sleep? Yes, she cannot sleep due to "brain not turning off"    Affecting daily activities? Yes, she is unable to mop, cook, or do most activities    Depressive symptoms? Yes, she has been diagnosed with depression          · SI/HI? No    Work status: Criminal Justice, disabled due to pain    Prescription Monitoring Program database:  Reviewed and consistent with medication use as prescribed.    Last 3 PDI Scores 5/21/2019   Pain Disability Index (PDI) 41       Opioid Risk Score       Value Time User    Opioid Risk Score  4 5/21/2019  3:57 PM Clare Alfonso MA          GENERAL:  Positive for fatigue.  No weight loss, malaise or fevers.  HEENT:   No recent changes in vision or hearing  NECK:  Negative for lumps, no difficulty with swallowing.  RESPIRATORY:  Positive for cough.  Negative for wheezing or shortness of breath, patient denies any recent URI.  CARDIOVASCULAR:  Negative for chest pain, leg swelling or palpitations.  GI:  Positive for diarrhea.  Negative for abdominal discomfort, blood in stools or black stools or change in bowel habits.  MUSCULOSKELETAL:  See HPI.  SKIN:  Positive for color change, texture change.  Negative for lesions, rash, and itching.  PSYCH:  Positive for " anxiety.  No other mood disorder or recent psychosocial stressors.    HEMATOLOGY/LYMPHOLOGY:  Positive for easy bruising.  Negative for prolonged bleeding or swollen nodes.    ENDO:  Positive for hypothyroidism and diabetes.    NEURO:   Positive for headaches, head trauma (due to recent fall), loss of balance, difficulty sleeping.  No history of syncope, paralysis, seizures or tremors.  All other reviewed and negative other than HPI.          Past Medical History:   Diagnosis Date    Acute pancreatitis     CHF (congestive heart failure)     COPD (chronic obstructive pulmonary disease)     Depression     Diverticulosis     GERD (gastroesophageal reflux disease)     Hypertension     Thyroid disease        Past Surgical History:   Procedure Laterality Date    ADRENAL GLAND SURGERY      CHOLECYSTECTOMY      COLONOSCOPY      HERNIA REPAIR      HYSTERECTOMY      INCISIONAL HERNIA REPAIR      instestine      PARATHYROID GLAND SURGERY      3 surgeries    UPPER GASTROINTESTINAL ENDOSCOPY         Review of patient's allergies indicates:   Allergen Reactions    Aspirin     Lortab [hydrocodone-acetaminophen] Itching    Phenergan [promethazine]     Promethazine hcl        Current Outpatient Medications   Medication Sig Dispense Refill    acetaminophen (TYLENOL) 325 MG tablet Take 2 tablets (650 mg total) by mouth every 6 (six) hours as needed (or equal to 101 degree F).      allopurinol (ZYLOPRIM) 300 MG tablet       amlodipine (NORVASC) 5 MG tablet Take 5 mg by mouth once daily.      amlodipine-benazepril 5-10 mg (LOTREL) 5-10 mg per capsule       buPROPion (WELLBUTRIN XL) 150 MG TB24 tablet Take 1 tablet (150 mg total) by mouth once daily. 30 tablet 1    clopidogrel (PLAVIX) 75 mg tablet Take 1 tablet (75 mg total) by mouth once daily. 14 tablet 0    clopidogrel (PLAVIX) 75 mg tablet Take 1 tablet (75 mg total) by mouth once daily. 90 tablet 3    colestipol (COLESTID) 1 gram Tab        cyanocobalamin 1,000 mcg/mL injection Inject 1 mL (1,000 mcg total) into the muscle every 14 (fourteen) days. Take 1 injection every other week for 6 doses 6 mL 2    escitalopram (LEXAPRO) 20 MG tablet Take 20 mg by mouth once daily.      gabapentin (NEURONTIN) 400 MG capsule       gabapentin (NEURONTIN) 800 MG tablet Take 400 mg by mouth 4 (four) times daily.       glycopyrrolate (ROBINUL) 1 mg Tab TK 1 T PO BID 90 tablet 3    HYDROcodone-acetaminophen (NORCO) 7.5-325 mg per tablet TK 1 T PO  BID PRN PAIN  0    ipratropium-albuterol (COMBIVENT)  mcg/actuation inhaler Inhale 1 puff into the lungs 4 (four) times daily. Rescue 1 Package 0    levothyroxine (SYNTHROID) 50 MCG tablet Take 50 mcg by mouth once daily.      methocarbamol (ROBAXIN) 750 MG Tab       metoprolol tartrate (LOPRESSOR) 100 MG tablet Take 1 tablet (100 mg total) by mouth 2 (two) times daily. 90 tablet 3    ondansetron (ZOFRAN) 4 MG tablet Take 8 mg by mouth every 8 (eight) hours.      pantoprazole (PROTONIX) 40 MG tablet Take 40 mg by mouth once daily.      potassium chloride SA (K-DUR,KLOR-CON) 20 MEQ tablet Take 1 tablet (20 mEq total) by mouth once daily. 30 tablet 3    traZODone (DESYREL) 100 MG tablet Take 1 tablet (100 mg total) by mouth every evening. 30 tablet 2     No current facility-administered medications for this visit.        Family History   Problem Relation Age of Onset    Stroke Maternal Grandmother     Cancer Maternal Grandfather     Stroke Mother     Heart disease Father        Social History     Socioeconomic History    Marital status: Single     Spouse name: Not on file    Number of children: 0    Years of education: Not on file    Highest education level: Not on file   Occupational History    Not on file   Social Needs    Financial resource strain: Not on file    Food insecurity:     Worry: Not on file     Inability: Not on file    Transportation needs:     Medical: Not on file     Non-medical:  "Not on file   Tobacco Use    Smoking status: Current Every Day Smoker     Packs/day: 1.00     Years: 15.00     Pack years: 15.00    Smokeless tobacco: Never Used   Substance and Sexual Activity    Alcohol use: No    Drug use: No    Sexual activity: Not Currently   Lifestyle    Physical activity:     Days per week: Not on file     Minutes per session: Not on file    Stress: Not on file   Relationships    Social connections:     Talks on phone: Not on file     Gets together: Not on file     Attends Mandaen service: Not on file     Active member of club or organization: Not on file     Attends meetings of clubs or organizations: Not on file     Relationship status: Not on file   Other Topics Concern    Not on file   Social History Narrative    Not on file       Objective:     Vitals:    05/21/19 1555   BP: (!) 145/67   Pulse: 65   Temp: 98 °F (36.7 °C)   TempSrc: Oral   Weight: 83.9 kg (185 lb)   Height: 5' 7" (1.702 m)   PainSc:   6       GEN:  Well developed, well nourished.  No acute distress. No pain behavior.  HEENT:  No trauma.  Mucous membranes moist.  Nares patent bilaterally.  PSYCH: Normal affect. Thought content appropriate.  CHEST:  Breathing symmetric.  No audible wheezing.  ABD: Soft, non-distended.  SKIN:  Warm, pink, dry.  No rash on exposed areas.    EXT:  No cyanosis, clubbing, or edema.  No color change or changes in nail or hair growth.  NEURO/MUSCULOSKELETAL:  Fully alert, oriented, and appropriate. Speech normal kevin. No cranial nerve deficits.   Gait: Antalgic.  Present trendelenburg sign bilaterally.   Motor Strength: 5/5 motor strength throughout lower extremities.   Sensory: Decreased sensation in bilateral soles of her feet equally.  Increased sensation in a right L5.   Reflexes:  1+ and symmetric throughout.  Downgoing Babinski's bilaterally.  No clonus or spasticity.  L-Spine:  Decreased ROM with pain on flexion. Minimal pain with axial/facet loading bilaterally.  Positive " SLR on the left for pain in an S1  SI Joint/Hip: Positive MIKEY on the right.  Negative MIKEY on the left and FADIR bilaterally.  TTP over lumbar paraspinals, right SI joint       Imaging:      The imaging studies listed below were independently reviewed by me, and I agree with the findings as documented below.     MRIs have been done today, but the reports are not yet available.  Pre for review of the MRI of the lumbar spine shows posterior instrumented spinal fusion at L4/5 with some residual degenerative disc disease at that level with adjacent endplate edema at L4 and L5. Otherwise, the MRI looks largely unremarkable.      Assessment:     Encounter Diagnoses   Name Primary?    Chronic pain disorder Yes    DDD (degenerative disc disease), lumbar     History of lumbar spinal fusion     Sacroiliac joint pain     Spondylosis of lumbosacral region without myelopathy or radiculopathy        Plan:     Alessia was seen today for back pain.    Diagnoses and all orders for this visit:    Chronic pain disorder    DDD (degenerative disc disease), lumbar    History of lumbar spinal fusion    Sacroiliac joint pain    Spondylosis of lumbosacral region without myelopathy or radiculopathy  -     Case Request Operating Room: RIGHT L5-S3 MEDIAL BRANCH BLOCKS     She presents today with a longstanding chronic pain history with several recent medical issues that are complicating the picture.  We will work to control her pain with as few sedating medications as possible.  It is reasonable to think that these may have had something to do with her falls given her symptoms of amnesia surrounding the event, lightheadedness, and confusion.  Today, her primary pain appears to be coming from her right SI joint      We discussed the assessment and recommendations.  All available images were reviewed. We discussed the disease process, prognosis, treatment plan, and risks and benefits. The patient is aware of the risks and benefits of the  medications being prescribed, common side effects, and proper usage. The following is the plan we agreed on:     1. Schedule for right L5-S3 medial branch blocks  1. If positive, RFA.  She will need to stop her Plavix for least 5 days for this  2. If negative, right L5 and S1 transforaminal ANTHONY.  She will need to stop her Plavix for this  2. We will re-evaluate medications at next visit.  She has another prescription for hydrocodone from her previous pain management provider.  If I were to take over her medications, we will work to minimize these as much as possible, with the goal being to taper off.  However, with her multiple medical conditions, this may not be fully possible.  However, as above, we will work to minimize sedating medications when possible  3. MIS[facility]EL from [facility]  4. We had an extensive discussion regarding the expected time course for PT for chronic pain vs after a surgery.  Specifically, we discussed that PT for chronic pain almost always causes a worsening of pain before any improvement, which is generally not seen for 3-6 months, in which time she will likely already be home doing home exercises given that a formal course of PT generally lasts anywhere from 6-12 weeks.  5. She would be a good candidate for FRP in the future  6. RTC in 3-6 weeks or sooner if needed.    Thank you for allowing me to participate in the care of this patient.   Please do not hesitate to call me at [phone] with any questions or concerns.    Sneha Aragon MD  05/21/2019     The above plan and management options were discussed at length with patient. Patient is in agreement with the above and verbalized understanding. It will be communicated with the referring physician via electronic record, fax, or mail.

## 2019-05-27 ENCOUNTER — HOSPITAL ENCOUNTER (OUTPATIENT)
Dept: RADIOLOGY | Facility: HOSPITAL | Age: 64
Discharge: HOME | End: 2019-05-27
Attending: INTERNAL MEDICINE | Admitting: ANESTHESIOLOGY
Payer: MEDICARE

## 2019-05-27 ENCOUNTER — HOSPITAL ENCOUNTER (OUTPATIENT)
Facility: HOSPITAL | Age: 64
Discharge: HOME | End: 2019-05-27
Attending: ANESTHESIOLOGY | Admitting: ANESTHESIOLOGY
Payer: MEDICARE

## 2019-05-27 VITALS
SYSTOLIC BLOOD PRESSURE: 138 MMHG | WEIGHT: 185 LBS | HEIGHT: 67 IN | RESPIRATION RATE: 10 BRPM | TEMPERATURE: 98 F | HEART RATE: 62 BPM | OXYGEN SATURATION: 97 % | BODY MASS INDEX: 29.03 KG/M2 | DIASTOLIC BLOOD PRESSURE: 59 MMHG

## 2019-05-27 DIAGNOSIS — M47.817 LUMBOSACRAL SPONDYLOSIS: Primary | ICD-10-CM

## 2019-05-27 DIAGNOSIS — K20.90 GASTROESOPHAGITIS: ICD-10-CM

## 2019-05-27 DIAGNOSIS — M47.817 SPONDYLOSIS OF LUMBOSACRAL REGION WITHOUT MYELOPATHY OR RADICULOPATHY: ICD-10-CM

## 2019-05-27 DIAGNOSIS — K29.70 GASTROESOPHAGITIS: ICD-10-CM

## 2019-05-27 PROCEDURE — 64493 PR INJ DX/THER AGNT PARAVERT FACET JOINT,IMG GUIDE,LUMBAR/SAC,1ST LVL: ICD-10-PCS | Mod: RT,,, | Performed by: ANESTHESIOLOGY

## 2019-05-27 PROCEDURE — S0020 INJECTION, BUPIVICAINE HYDRO: HCPCS

## 2019-05-27 PROCEDURE — 64635 DESTROY LUMB/SAC FACET JNT: CPT | Mod: RT | Performed by: ANESTHESIOLOGY

## 2019-05-27 PROCEDURE — 64494 INJ PARAVERT F JNT L/S 2 LEV: CPT | Mod: RT | Performed by: ANESTHESIOLOGY

## 2019-05-27 PROCEDURE — 64494 INJ PARAVERT F JNT L/S 2 LEV: CPT | Mod: RT,,, | Performed by: ANESTHESIOLOGY

## 2019-05-27 PROCEDURE — 64493 INJ PARAVERT F JNT L/S 1 LEV: CPT | Mod: RT,,, | Performed by: ANESTHESIOLOGY

## 2019-05-27 PROCEDURE — 74240 X-RAY XM UPR GI TRC 1CNTRST: CPT | Mod: 26,,, | Performed by: RADIOLOGY

## 2019-05-27 PROCEDURE — 25000003 PHARM REV CODE 250

## 2019-05-27 PROCEDURE — 74250 X-RAY XM SM INT 1CNTRST STD: CPT | Mod: 26,,, | Performed by: RADIOLOGY

## 2019-05-27 PROCEDURE — 74250 FL SMALL BOWEL FOLLOW THROUGH: ICD-10-PCS | Mod: 26,,, | Performed by: RADIOLOGY

## 2019-05-27 PROCEDURE — 64495 PR INJ DX/THER AGNT PARAVERT FACET JOINT,IMG GUIDE,LUMBAR/SAC, ADD LEVEL: ICD-10-PCS | Mod: RT,,, | Performed by: ANESTHESIOLOGY

## 2019-05-27 PROCEDURE — 64636 DESTROY L/S FACET JNT ADDL: CPT | Mod: RT | Performed by: ANESTHESIOLOGY

## 2019-05-27 PROCEDURE — 64495 INJ PARAVERT F JNT L/S 3 LEV: CPT | Mod: RT

## 2019-05-27 PROCEDURE — 64493 INJ PARAVERT F JNT L/S 1 LEV: CPT | Mod: RT | Performed by: ANESTHESIOLOGY

## 2019-05-27 PROCEDURE — 74250 X-RAY XM SM INT 1CNTRST STD: CPT | Mod: TC,FY

## 2019-05-27 PROCEDURE — 64495 INJ PARAVERT F JNT L/S 3 LEV: CPT | Mod: RT,,, | Performed by: ANESTHESIOLOGY

## 2019-05-27 PROCEDURE — 74240 FL UPPER GI W/OUT KUB TO INCLUDE ESOPHAGRAM: ICD-10-PCS | Mod: 26,,, | Performed by: RADIOLOGY

## 2019-05-27 PROCEDURE — 64494 PR INJ DX/THER AGNT PARAVERT FACET JOINT,IMG GUIDE,LUMBAR/SAC, 2ND LEVEL: ICD-10-PCS | Mod: RT,,, | Performed by: ANESTHESIOLOGY

## 2019-05-27 PROCEDURE — 74240 X-RAY XM UPR GI TRC 1CNTRST: CPT | Mod: TC,FY

## 2019-05-27 NOTE — OP NOTE
Date of Procedure: 05/27/2019    Procedure: Right L5-S3 Lumbar medial branch blocks    Pre-op diagnosis: Lumbar Spondylosis [M47.816]    Post-op diagnosis: Lumbar Spondylosis [M47.816]     Physician: Dr. Sneha Aragon     Assistant: None    Anesthestia: local    EBL: None    Specimens: None    All medications, allergies, and relevant histories were reviewed. No recent antibiotics or infections.  A time-out was taken to verify the correct patient, procedure, laterality, and appropriate medications/allergies.    Lumbar Medial Branch Block:   The procedure risks, benefits, and possible complications were discussed with the patient including nerve damage, spinal headache, bleeding, infection, and failure of pain relief.   Patient was placed in the prone position with the midriff elevated. Oblique view of the spine was obtained with fluoroscopy. Entry sites marked over the skin. The skin was prepped with chlorhexidine x3 and draped. Sterile precautions observed throughout the procedure. Xylocaine 1% was infiltrated locally over the entry site. A 22-gauge spinal needle was introduced at an angle, and the needle was placed onto the junction of the superior articular process and the most supermedial point on the transverse process. Placement was confirmed with a fluoroscopic view. After negative aspiration, 1cc of bupivacaine 0.5% was injected at each level. Needle was restyletted and removed. Procedure performed at the levels indicated: Right L5-S3.     Patient tolerated the procedure well and there were no complications.   The patient was discharged home with a responsible adult.      Future Management:   If good results, can proceed to RFA.  If no relief, can follow up to discuss options.

## 2019-05-29 ENCOUNTER — TELEPHONE (OUTPATIENT)
Dept: HEMATOLOGY/ONCOLOGY | Facility: CLINIC | Age: 64
End: 2019-05-29

## 2019-05-29 ENCOUNTER — TELEPHONE (OUTPATIENT)
Dept: PAIN MEDICINE | Facility: CLINIC | Age: 64
End: 2019-05-29

## 2019-05-29 NOTE — TELEPHONE ENCOUNTER
Great!  We can schedule the RFA for the levels of L5-S3 with the [employer] Coolief thermal system, IV sedation.  She will need to stop her Plavix for 7 days prior to this.  We can push back her follow up until 1-2 weeks after the procedure.    Thanks!  LW

## 2019-05-29 NOTE — TELEPHONE ENCOUNTER
Spoke with patient, states she received >80% for approximately 6 hours after L3-5 RMBB. After hour 6, patient experienced approximately 50% pain relief until hour 12. Patient has follow-up appointment on 6/4/19 @ 3:30pm.

## 2019-05-31 ENCOUNTER — TELEPHONE (OUTPATIENT)
Dept: PAIN MEDICINE | Facility: CLINIC | Age: 64
End: 2019-05-31

## 2019-05-31 DIAGNOSIS — M43.07 LUMBOSACRAL SPONDYLOLYSIS: Primary | ICD-10-CM

## 2019-05-31 NOTE — TELEPHONE ENCOUNTER
Spoke with patient, she would like to have RFA on 6/10/19. Patient aware and voiced understanding of holding plavix x 7 days prior and  needed on procedure day. Case request entered. Follow-up scheduled on 6/25/19    ----- Message from Mackenzie Craig sent at 5/31/2019  9:51 AM CDT -----  Contact: Alessia pt  Type:  Patient Returning Call    Who Called:  Alessia  Who Left Message for Patient:  Yolis  Does the patient know what this is regarding?:  Scheduling a procedure  Best Call Back Number:  [phone]  Additional Information:  Pls call pt regarding. Tried skype n/a

## 2019-06-06 ENCOUNTER — OFFICE VISIT (OUTPATIENT)
Dept: GASTROENTEROLOGY | Facility: CLINIC | Age: 64
End: 2019-06-06
Payer: MEDICARE

## 2019-06-06 ENCOUNTER — OFFICE VISIT (OUTPATIENT)
Dept: CARDIOLOGY | Facility: CLINIC | Age: 64
End: 2019-06-06
Payer: MEDICARE

## 2019-06-06 VITALS
DIASTOLIC BLOOD PRESSURE: 69 MMHG | HEIGHT: 67 IN | WEIGHT: 187 LBS | OXYGEN SATURATION: 97 % | SYSTOLIC BLOOD PRESSURE: 131 MMHG | HEART RATE: 58 BPM | BODY MASS INDEX: 29.35 KG/M2

## 2019-06-06 VITALS
HEART RATE: 66 BPM | RESPIRATION RATE: 16 BRPM | WEIGHT: 186 LBS | SYSTOLIC BLOOD PRESSURE: 142 MMHG | OXYGEN SATURATION: 97 % | TEMPERATURE: 98 F | HEIGHT: 67 IN | BODY MASS INDEX: 29.19 KG/M2 | DIASTOLIC BLOOD PRESSURE: 70 MMHG

## 2019-06-06 DIAGNOSIS — E66.09 CLASS 1 OBESITY DUE TO EXCESS CALORIES WITH SERIOUS COMORBIDITY AND BODY MASS INDEX (BMI) OF 30.0 TO 30.9 IN ADULT: ICD-10-CM

## 2019-06-06 DIAGNOSIS — K57.30 DIVERTICULOSIS OF LARGE INTESTINE WITHOUT HEMORRHAGE: ICD-10-CM

## 2019-06-06 DIAGNOSIS — Z90.49 HISTORY OF CHOLECYSTECTOMY: ICD-10-CM

## 2019-06-06 DIAGNOSIS — Z82.49 FAMILY HISTORY OF PREMATURE CAD: ICD-10-CM

## 2019-06-06 DIAGNOSIS — I10 ESSENTIAL HYPERTENSION: ICD-10-CM

## 2019-06-06 DIAGNOSIS — R10.11 ABDOMINAL DISCOMFORT IN RIGHT UPPER QUADRANT: ICD-10-CM

## 2019-06-06 DIAGNOSIS — E65 ABDOMINAL OBESITY: ICD-10-CM

## 2019-06-06 DIAGNOSIS — Z78.9 EXCESSIVE CAFFEINE INTAKE: ICD-10-CM

## 2019-06-06 DIAGNOSIS — R29.6 MULTIPLE FALLS: ICD-10-CM

## 2019-06-06 DIAGNOSIS — E03.9 HYPOTHYROIDISM, UNSPECIFIED TYPE: ICD-10-CM

## 2019-06-06 DIAGNOSIS — I50.9 CHRONIC CONGESTIVE HEART FAILURE, UNSPECIFIED HEART FAILURE TYPE: ICD-10-CM

## 2019-06-06 DIAGNOSIS — Z11.59 NEED FOR HEPATITIS C SCREENING TEST: ICD-10-CM

## 2019-06-06 DIAGNOSIS — I11.0 LVH (LEFT VENTRICULAR HYPERTROPHY) DUE TO HYPERTENSIVE DISEASE, WITH HEART FAILURE: ICD-10-CM

## 2019-06-06 DIAGNOSIS — F17.200 SMOKING: ICD-10-CM

## 2019-06-06 DIAGNOSIS — E78.5 DYSLIPIDEMIA: ICD-10-CM

## 2019-06-06 DIAGNOSIS — K83.9 BILE DUCT ABNORMALITY: Primary | ICD-10-CM

## 2019-06-06 DIAGNOSIS — R55 SYNCOPE AND COLLAPSE: ICD-10-CM

## 2019-06-06 DIAGNOSIS — K52.9 CHRONIC DIARRHEA: ICD-10-CM

## 2019-06-06 DIAGNOSIS — I95.1 ORTHOSTATIC HYPOTENSION: ICD-10-CM

## 2019-06-06 DIAGNOSIS — Z78.9 ASPIRIN INTOLERANCE: ICD-10-CM

## 2019-06-06 DIAGNOSIS — I25.10 ATHEROSCLEROSIS OF NATIVE CORONARY ARTERY OF NATIVE HEART WITHOUT ANGINA PECTORIS: ICD-10-CM

## 2019-06-06 DIAGNOSIS — Z95.5 S/P DRUG ELUTING CORONARY STENT PLACEMENT: Primary | ICD-10-CM

## 2019-06-06 PROCEDURE — 3008F BODY MASS INDEX DOCD: CPT | Mod: CPTII,S$GLB,, | Performed by: INTERNAL MEDICINE

## 2019-06-06 PROCEDURE — 99999 PR PBB SHADOW E&M-EST. PATIENT-LVL III: ICD-10-PCS | Mod: PBBFAC,,, | Performed by: INTERNAL MEDICINE

## 2019-06-06 PROCEDURE — 3008F PR BODY MASS INDEX (BMI) DOCUMENTED: ICD-10-PCS | Mod: CPTII,S$GLB,, | Performed by: INTERNAL MEDICINE

## 2019-06-06 PROCEDURE — 3074F PR MOST RECENT SYSTOLIC BLOOD PRESSURE < 130 MM HG: ICD-10-PCS | Mod: CPTII,S$GLB,, | Performed by: INTERNAL MEDICINE

## 2019-06-06 PROCEDURE — 99205 OFFICE O/P NEW HI 60 MIN: CPT | Mod: S$GLB,,, | Performed by: INTERNAL MEDICINE

## 2019-06-06 PROCEDURE — 99213 OFFICE O/P EST LOW 20 MIN: CPT | Mod: S$GLB,,, | Performed by: INTERNAL MEDICINE

## 2019-06-06 PROCEDURE — 99213 PR OFFICE/OUTPT VISIT, EST, LEVL III, 20-29 MIN: ICD-10-PCS | Mod: S$GLB,,, | Performed by: INTERNAL MEDICINE

## 2019-06-06 PROCEDURE — 3078F DIAST BP <80 MM HG: CPT | Mod: CPTII,S$GLB,, | Performed by: INTERNAL MEDICINE

## 2019-06-06 PROCEDURE — 99999 PR PBB SHADOW E&M-EST. PATIENT-LVL IV: CPT | Mod: PBBFAC,,, | Performed by: INTERNAL MEDICINE

## 2019-06-06 PROCEDURE — 3074F SYST BP LT 130 MM HG: CPT | Mod: CPTII,S$GLB,, | Performed by: INTERNAL MEDICINE

## 2019-06-06 PROCEDURE — 3078F PR MOST RECENT DIASTOLIC BLOOD PRESSURE < 80 MM HG: ICD-10-PCS | Mod: CPTII,S$GLB,, | Performed by: INTERNAL MEDICINE

## 2019-06-06 PROCEDURE — 99205 PR OFFICE/OUTPT VISIT, NEW, LEVL V, 60-74 MIN: ICD-10-PCS | Mod: S$GLB,,, | Performed by: INTERNAL MEDICINE

## 2019-06-06 PROCEDURE — 3075F SYST BP GE 130 - 139MM HG: CPT | Mod: CPTII,S$GLB,, | Performed by: INTERNAL MEDICINE

## 2019-06-06 PROCEDURE — 3075F PR MOST RECENT SYSTOLIC BLOOD PRESS GE 130-139MM HG: ICD-10-PCS | Mod: CPTII,S$GLB,, | Performed by: INTERNAL MEDICINE

## 2019-06-06 PROCEDURE — 99999 PR PBB SHADOW E&M-EST. PATIENT-LVL IV: ICD-10-PCS | Mod: PBBFAC,,, | Performed by: INTERNAL MEDICINE

## 2019-06-06 PROCEDURE — 99999 PR PBB SHADOW E&M-EST. PATIENT-LVL III: CPT | Mod: PBBFAC,,, | Performed by: INTERNAL MEDICINE

## 2019-06-06 RX ORDER — ROSUVASTATIN CALCIUM 40 MG/1
40 TABLET, COATED ORAL DAILY
Qty: 90 TABLET | Refills: 3 | Status: SHIPPED | OUTPATIENT
Start: 2019-06-06 | End: 2019-10-17

## 2019-06-06 NOTE — LETTER
June 6, 2019      Cheryl Bradley, NP  [address]  [place] [address]           [facility] - Cardiology  [address]  [place] [address]  Phone: [phone]  Fax: [phone]          Patient: Alessia Nelsno   MR Number: [id number]   YOB: 1955   Date of Visit: 6/6/2019       Dear Cheryl Bradley:    Thank you for referring Alessia Nelson to me for evaluation. Attached you will find relevant portions of my assessment and plan of care.    If you have questions, please do not hesitate to call me. I look forward to following Alessia Nelson along with you.    Sincerely,    Jovanni Serrano MD    Enclosure  CC:  No Recipients    If you would like to receive this communication electronically, please contact [email or web address] or [phone] to request more information on [employer] access.    For providers and/or their staff who would like to refer a patient to [facility], please contact us through our one-stop-shop provider referral line, [facility], at [phone].    If you feel you have received this communication in error or would no longer like to receive these types of communications, please e-mail [email or web address]

## 2019-06-06 NOTE — PATIENT INSTRUCTIONS
She will continue her therapy in the Pain Clinic for the chronic back pain.  I have requested her previous gastrointestinal evaluation.  She has chronic diarrhea.  She will try to make a diary to pinpoint a precipitating factor.  She will add more fiber to the diet.  She has an abnormal MRCP showing a narrowed hepatic duct 10 she is referred for a ERCP.  She will continue her current medications and reflux regimen.

## 2019-06-06 NOTE — PROGRESS NOTES
Subjective:       Patient ID: Alessia Nelson is a 63 y.o. female.    Chief Complaint: Follow-up (3 week f/u)    She is going to the Pain Clinic.  She has chronic back pain.  She has had multiple cervical spine lumbar spine and abdominal surgeries.  The MRCP reveals she has had a cholecystectomy and a narrowing of the hepatic duct.  She is referred for ERCP.  She complains of right upper quadrant and epigastric pain that is ill defined.  It is not related to gastrointestinal function or musculoskeletal function.  She denies hematemesis hematochezia or jaundice.  She has chronic diarrhea.  She has had an extensive GI evaluation and I have requested the records.  She denies a precipitating factor.  She she denies pyrosis or dyspepsia.  Her gastroesophageal reflux is well controlled.  She was in the emergency room with elevated amylase.  She thinks that this is the 2nd episode.  Possibly she has had pancreatitis.  She states she has had so many abdominal surgeries that she generally has had persistent abdominal discomfort.      Allergies:  Review of patient's allergies indicates:   Allergen Reactions    Aspirin     Lortab [hydrocodone-acetaminophen] Itching    Phenergan [promethazine]     Promethazine hcl        Medications:    Current Outpatient Medications:     rosuvastatin (CRESTOR) 40 MG Tab, Take 1 tablet (40 mg total) by mouth once daily., Disp: 90 tablet, Rfl: 3    Past Medical History:   Diagnosis Date    Acute pancreatitis     CHF (congestive heart failure)     COPD (chronic obstructive pulmonary disease)     Depression     Diverticulosis     GERD (gastroesophageal reflux disease)     Hypertension     Thyroid disease        Past Surgical History:   Procedure Laterality Date    ADRENAL GLAND SURGERY      CHOLECYSTECTOMY      COLONOSCOPY      HERNIA REPAIR      HYSTERECTOMY      INCISIONAL HERNIA REPAIR      instestine      PARATHYROID GLAND SURGERY      3 surgeries    RIGHT L5-S3 MEDIAL  "BRANCH BLOCKS Right 5/27/2019    [facility]    UPPER GASTROINTESTINAL ENDOSCOPY           Review of Systems   Constitutional: Negative for appetite change, fever and unexpected weight change.   HENT: Negative for trouble swallowing.         No jaundice.   Respiratory: Negative for cough, shortness of breath and wheezing.         No Rales, Rhonchi, or Dyspnea.   Cardiovascular: Negative for chest pain.   Gastrointestinal: Positive for abdominal pain. Negative for abdominal distention, anal bleeding, blood in stool, constipation and diarrhea.        She states her diarrhea has been worse since the cholecystectomy.  She describes abdominal pain "all over but sometimes in the epigastrium right upper quadrant.  The episode the precipitated her emergency room visit was in the epigastrium radiating through to the back but was not associated with nausea.  She does not consume alcohol.  The pantoprazole has resolved her upper GI symptoms.   Endocrine:        Her hypothyroidism hyperlipidemia   Musculoskeletal: Positive for arthralgias and back pain. Negative for neck pain.   Skin: Negative for pallor and rash.   Neurological: Negative for dizziness, seizures, syncope, speech difficulty, weakness and numbness.   Hematological: Negative for adenopathy.   Psychiatric/Behavioral: Negative for confusion.       Objective:      Physical Exam   Constitutional: She is oriented to person, place, and time. She appears well-developed and well-nourished.   Well-nourished well-hydrated overweight nonicteric white female   HENT:   Head: Normocephalic.   Eyes: Pupils are equal, round, and reactive to light. EOM are normal.   Neck: Normal range of motion. Neck supple. No tracheal deviation present. No thyromegaly present.   Cardiovascular: Normal rate, regular rhythm and normal heart sounds.   Pulmonary/Chest: Effort normal and breath sounds normal.   Abdominal: Soft. Bowel sounds are normal. She exhibits no " distension and no mass. There is tenderness. There is no rebound and no guarding. No hernia.   The abdomen is soft it is obese there healed scars.  There is mild tenderness in the right upper quadrant.  Definite masses organomegaly are absent.  Bowel sounds are normal   Musculoskeletal: Normal range of motion.   Lymphadenopathy:     She has no cervical adenopathy.   Neurological: She is alert and oriented to person, place, and time. No cranial nerve deficit.   Skin: Skin is warm and dry.   Psychiatric: She has a normal mood and affect. Her behavior is normal.   Vitals reviewed.        Plan:       Bile duct abnormality  -     Cancel: Ambulatory Referral to Cardiology    Abdominal discomfort in right upper quadrant    Chronic diarrhea    History of cholecystectomy    Diverticulosis of large intestine without hemorrhage

## 2019-06-06 NOTE — PROGRESS NOTES
Subjective:    Patient ID:  Alessia Nelson is a 63 y.o. female who presents for evaluation of Establish Care; Congestive Heart Failure; Hypertension; and COPD  For cardiac review, CAD post RADHA not on statin, smoker  PCP and referred by Cheryl Bradley NP  Prior cardiologist: Dr. Alanis, [place], last seen 10/2018  Lives alone, have dog, Claire  Retired and disabled due to CLBP, prior     Patient is a new patient to me.  Difficult historian with memory difficulties.     Health literacy: high   Activities: swim every other day, 1-2 hours, no problem, can not do yard work due to CLBP  Nicotine: 1 ppd, 46 py  Alcohol: up to 3 drink during vacations  Illicit drugs: marijuana during visit to [facility] for pain   Cardiac symptoms: none  Home BP: up and down  Medication compliance: yes  Diet: regular no added salt  Caffeine: 10 cpd, have sleep disorder  Labs: 2019, no LDL not on Rx, normal CMP, CBC, no TSH, no A1C  Last Echo: 2017 in [place]  Last stress test: 2017  Cardiovascular angiogram: 2017 with PCI  EC2019 NSR 68, normal  Fundoscopic exam: none for 2 years    WF with lots of various complaints, here to establish cardiac care, wants to change to [facility]. Have a number of CV issues, see Problem List. Active smoker but remain active.    Chart reviewed  Echo 2013        Review of Systems   Constitution: Negative for diaphoresis, fever, malaise/fatigue, night sweats and weight gain.   HENT: Positive for congestion. Negative for nosebleeds and tinnitus.    Eyes: Negative for visual disturbance.   Cardiovascular: Positive for leg swelling and near-syncope. Negative for chest pain, claudication, cyanosis, dyspnea on exertion, irregular heartbeat, orthopnea, palpitations and paroxysmal nocturnal dyspnea.   Respiratory: Positive for cough and sleep disturbances due to breathing. Negative for shortness of breath, snoring and wheezing.         Godfrey score 0   Endocrine: Positive  "for cold intolerance. Negative for polydipsia and polyuria.   Hematologic/Lymphatic: Bruises/bleeds easily.   Skin: Positive for dry skin and skin cancer. Negative for color change, flushing, nail changes, poor wound healing and suspicious lesions.   Musculoskeletal: Positive for arthritis, back pain, falls, gout, joint swelling, muscle cramps, myalgias, neck pain and stiffness. Negative for joint pain and muscle weakness.   Gastrointestinal: Positive for bloating, abdominal pain, diarrhea, flatus, heartburn, hemorrhoids and nausea. Negative for hematemesis, hematochezia and melena.   Genitourinary: Positive for hematuria.   Neurological: Positive for difficulty with concentration, disturbances in coordination, excessive daytime sleepiness, dizziness, headaches, light-headedness, loss of balance, numbness, paresthesias and vertigo. Negative for focal weakness and weakness.   Psychiatric/Behavioral: Positive for depression. Negative for substance abuse. The patient has insomnia and is nervous/anxious.    Allergic/Immunologic: Positive for environmental allergies.        Objective:    Physical Exam   Constitutional: She is oriented to person, place, and time. She appears well-developed and well-nourished.   BP siting 125/62, HR 59, standing 104/59, HR 62, no symptom   HENT:   Head: Normocephalic.   Eyes: Pupils are equal, round, and reactive to light. Conjunctivae and EOM are normal.   Neck: Normal range of motion. Neck supple. No JVD present. No thyromegaly present.   Circumference 15"   Cardiovascular: Normal rate, regular rhythm and intact distal pulses. Exam reveals distant heart sounds. Exam reveals no gallop and no friction rub.   No murmur heard.  Pulses:       Carotid pulses are 2+ on the right side, and 2+ on the left side.       Radial pulses are 2+ on the right side, and 2+ on the left side.        Femoral pulses are 2+ on the right side, and 2+ on the left side.       Popliteal pulses are 2+ on the right " "side, and 2+ on the left side.        Dorsalis pedis pulses are 2+ on the right side, and 1+ on the left side.        Posterior tibial pulses are 2+ on the right side, and 1+ on the left side.   Pulmonary/Chest: Effort normal and breath sounds normal. She has no rales. She exhibits no tenderness.   Diminished breath sounds and prolong expiration.   Abdominal: Soft. Bowel sounds are normal. There is no tenderness.   Waist 42", hip 42"   Musculoskeletal: Normal range of motion. She exhibits no edema.   Lymphadenopathy:     She has no cervical adenopathy.   Neurological: She is alert and oriented to person, place, and time.   Skin: Skin is warm and dry. No rash noted.         Assessment:       1. S/P drug eluting coronary stent placement, 8/2017    2. Smoking, 1 ppd, 46 py, started age 17    3. Essential hypertension    4. Family history of premature CAD    5. Class 1 obesity due to excess calories with serious comorbidity and body mass index (BMI) of 30.0 to 30.9 in adult, today 29.1    6. Hypothyroidism, unspecified type    7. Chronic congestive heart failure, unspecified heart failure type    8. Need for hepatitis C screening test    9. Syncope and collapse, onset 2015, about 10 times, one episode noted hypotension at doctor office 9/2017    10. Multiple falls, started 9/2018, 5 times usually on standing    11. Excessive caffeine intake    12. Orthostatic hypotension    13. Abdominal obesity    14. Aspirin intolerance    15. Dyslipidemia    16. Atherosclerosis of native coronary artery of native heart without angina pectoris          Plan:       Alessia was seen today for establish care, congestive heart failure, hypertension and copd.    Diagnoses and all orders for this visit:    S/P drug eluting coronary stent placement, 8/2017  -     rosuvastatin (CRESTOR) 40 MG Tab; Take 1 tablet (40 mg total) by mouth once daily.  -     Lipid panel; Future  -     High sensitivity CRP (Cardiac CRP); Future    Smoking, 1 ppd, 46 " py, started age 17    Essential hypertension  -     Lipid panel    Family history of premature CAD    Class 1 obesity due to excess calories with serious comorbidity and body mass index (BMI) of 30.0 to 30.9 in adult, today 29.1    Hypothyroidism, unspecified type  -     TSH    Chronic congestive heart failure, unspecified heart failure type  -     Lipid panel    Need for hepatitis C screening test  -     Hepatitis C antibody    Syncope and collapse, onset 2015, about 10 times, one episode noted hypotension at doctor office 9/2017    Multiple falls, started 9/2018, 5 times usually on standing    Excessive caffeine intake    Orthostatic hypotension    Abdominal obesity    Aspirin intolerance    Dyslipidemia  -     rosuvastatin (CRESTOR) 40 MG Tab; Take 1 tablet (40 mg total) by mouth once daily.  -     Lipid panel; Future  -     High sensitivity CRP (Cardiac CRP); Future    Atherosclerosis of native coronary artery of native heart without angina pectoris   -     High sensitivity CRP (Cardiac CRP); Future    - All medical issues reviewed, continue current Rx.  - CV status stable, continue current Rx except will start high-intensity statin, all medications reviewed, patient acknowledge good understanding.  - Recommend healthy living: stop nicotine, moderate alcohol, healthy diet and regular exercise aiming for fitness, and weight control  - Discussed healthy alcohol daily limit of 0.5 oz of pure alcohol in any 24 hours (roughly one 12-oz beers, 4 oz of wine (8%-12% alcohol), or 1.25 oz (half a shot) of liquor (80 proof)), can not save up.  - Need annual fundoscopic examination  - Advise to reduce caffeine intake  - CBT, cognitive behavior therapy for sleep disorder.   - Prefer no cardiac testing at this time  - Instruction for Mediterranean diet and heart healthy exercise given.  - Check home blood pressure, 2 days weekly, do 2 readings within 5 minutes in AM and PM, keep log for review.  - Weigh twice weekly, try to  lose 1-2 lbs per week. Target weight loss of 5%-10%.  - Have to do some abdominal exercise to rid belly fat  - Highly recommend 30 minutes of exercise / activities daily, can have Sunday off, with 2-3 sessions of muscle strengthening weekly. A  would be very helpful.  - Recommend at least annual cardiovascular evaluation in view of patient's significant risk factors.  - Phone review / encourage use of [facility]      Total face-to-face time with the patient was 50 minutes and greater than 50% was spent in counseling and coordination of care. The above assessment and plan have been discussed at length. Labs and procedure over the last 6 months reviewed. Problem List updated. Asked to bring in all active medications / pills bottles with next visit.

## 2019-06-06 NOTE — LETTER
June 6, 2019      Cheryl Bradley, NP  [address]  [place] [address]           [facility]  [address]  [place] [address]  Phone: [phone]  Fax: [phone]          Patient: Alessia Nelson   MR Number: [id number]   YOB: 1955   Date of Visit: 6/6/2019       Dear Cheryl Bradley:    Thank you for referring Alessia Nelson to me for evaluation. Attached you will find relevant portions of my assessment and plan of care.    If you have questions, please do not hesitate to call me. I look forward to following Alessia Nelosn along with you.    Sincerely,    Manpreet Fraga MD    Enclosure  CC:  No Recipients    If you would like to receive this communication electronically, please contact [email or web address] or [phone] to request more information on [facility] access.    For providers and/or their staff who would like to refer a patient to [facility], please contact us through our one-stop-shop provider referral line, [facility], at [phone].    If you feel you have received this communication in error or would no longer like to receive these types of communications, please e-mail [email or web address]

## 2019-06-10 ENCOUNTER — HOSPITAL ENCOUNTER (OUTPATIENT)
Facility: HOSPITAL | Age: 64
Discharge: HOME | End: 2019-06-10
Attending: ANESTHESIOLOGY | Admitting: ANESTHESIOLOGY
Payer: MEDICARE

## 2019-06-10 VITALS
HEIGHT: 67 IN | DIASTOLIC BLOOD PRESSURE: 58 MMHG | HEART RATE: 58 BPM | TEMPERATURE: 98 F | BODY MASS INDEX: 29.03 KG/M2 | RESPIRATION RATE: 10 BRPM | SYSTOLIC BLOOD PRESSURE: 114 MMHG | WEIGHT: 185 LBS | OXYGEN SATURATION: 97 %

## 2019-06-10 DIAGNOSIS — M47.817 SPONDYLOSIS OF LUMBOSACRAL REGION WITHOUT MYELOPATHY OR RADICULOPATHY: Primary | ICD-10-CM

## 2019-06-10 DIAGNOSIS — M47.817 LUMBOSACRAL SPONDYLOSIS: ICD-10-CM

## 2019-06-10 PROCEDURE — 99152 PR MOD CONSCIOUS SEDATION, SAME PHYS, 5+ YRS, FIRST 15 MIN: ICD-10-PCS | Mod: ,,, | Performed by: ANESTHESIOLOGY

## 2019-06-10 PROCEDURE — 64635 DESTROY LUMB/SAC FACET JNT: CPT | Mod: RT | Performed by: ANESTHESIOLOGY

## 2019-06-10 PROCEDURE — 64640 PR DESTRUCT BY NEURO AGENT; OTHER PERIPH NERVE: ICD-10-PCS | Mod: 59,RT,, | Performed by: ANESTHESIOLOGY

## 2019-06-10 PROCEDURE — 99153 MOD SED SAME PHYS/QHP EA: CPT | Performed by: ANESTHESIOLOGY

## 2019-06-10 PROCEDURE — 64636 DESTROY L/S FACET JNT ADDL: CPT | Mod: RT | Performed by: ANESTHESIOLOGY

## 2019-06-10 PROCEDURE — 64635 PR DESTROY LUMB/SAC FACET JNT: ICD-10-PCS | Mod: RT,,, | Performed by: ANESTHESIOLOGY

## 2019-06-10 PROCEDURE — 64640 INJECTION TREATMENT OF NERVE: CPT | Mod: 59,RT,, | Performed by: ANESTHESIOLOGY

## 2019-06-10 PROCEDURE — 99152 MOD SED SAME PHYS/QHP 5/>YRS: CPT | Performed by: ANESTHESIOLOGY

## 2019-06-10 PROCEDURE — 63600175 PHARM REV CODE 636 W HCPCS

## 2019-06-10 PROCEDURE — 63600175 PHARM REV CODE 636 W HCPCS: Performed by: ANESTHESIOLOGY

## 2019-06-10 PROCEDURE — 64635 DESTROY LUMB/SAC FACET JNT: CPT | Mod: RT,,, | Performed by: ANESTHESIOLOGY

## 2019-06-10 PROCEDURE — 99152 MOD SED SAME PHYS/QHP 5/>YRS: CPT | Mod: ,,, | Performed by: ANESTHESIOLOGY

## 2019-06-10 RX ORDER — SODIUM CHLORIDE 9 MG/ML
INJECTION, SOLUTION INTRAVENOUS CONTINUOUS
Status: DISCONTINUED | OUTPATIENT
Start: 2019-06-10 | End: 2019-06-10 | Stop reason: HOSPADM

## 2019-06-10 RX ORDER — MIDAZOLAM HYDROCHLORIDE 1 MG/ML
INJECTION INTRAMUSCULAR; INTRAVENOUS
Status: DISCONTINUED | OUTPATIENT
Start: 2019-06-10 | End: 2019-06-10 | Stop reason: HOSPADM

## 2019-06-10 RX ORDER — FENTANYL CITRATE 50 UG/ML
INJECTION, SOLUTION INTRAMUSCULAR; INTRAVENOUS
Status: DISCONTINUED | OUTPATIENT
Start: 2019-06-10 | End: 2019-06-10 | Stop reason: HOSPADM

## 2019-06-10 RX ORDER — SODIUM CHLORIDE 9 MG/ML
INJECTION, SOLUTION INTRAVENOUS CONTINUOUS
Status: CANCELLED | OUTPATIENT
Start: 2019-06-10

## 2019-06-10 NOTE — OP NOTE
"Date of Procedure: 06/10/2019    Procedure: L5-S3 Right Medial Branch Nerve Coolief thermal Radiofrequency Ablation    Pre-op diagnosis: Lumbosacral spondylosis    Post-op diagnosis: Lumbosacral spondylosis     Physician: Dr. Sneha Aragon     Assistant: None    Anesthestia: local/IV sedation: Versed 3 mg and fentanyl 100 mcg IV. Conscious sedation provided by MD and monitored by RN. Total sedation time was less than 60 minutes. (See nurse documentation and case log for sedation time)    EBL: None    Specimens: None    All medications, allergies, and relevant histories were reviewed. No recent antibiotics or infections. A time-out was taken to verify the correct patient, procedure, laterality, and appropriate medications/allergies.    Procedure: Lumbosacral cooled RFA    Lumbar Medial Branch Block with conventional radiofrequency, levels L5-S3     The procedure risks, benefits, and possible complications were discussed with the patient including nerve damage, infection, spinal headache, and paresis.   Patient was placed in the prone position with the midriff elevated. Skin was prepped with CHG and draped. Oblique view of the spine was obtained with fluoroscopy. Entry sites were marked over the skin and Xylocaine 1% was used to anesthetize the skin and subcutaneous tissues.     A 17-gauge insulated, Halyard Coolief RF needle was introduced at an angle to the junction of the S1 superior articular process and sacral ala for L5.     For S1, S2, and S3, the needle was initially place 10 mm lateral to the opening of the corresponding neural foramen. Next, the needles were placed at both the "1 o'clock", maintaining the same 10 mm distance from the opening. After each lesion, the needles were adjusted so that the lesion covered the entire lateral aspect of the neural foramen, maintaining the 10 mm distance.  The below cooled RF thermal lesions were created in each spot.     No motor stimulation was elicited at any level at " 2V with a frequency of 2Hz. All impedances were less than 500 mA.    1cc of 2% lidocaine was injected at each level.  Coolief Thermal RF was then conducted at each level at 60 degrees at the probe, 80 degree lesion, for 2:30 minutes per level.  1 cc of a mixture of 0.5% bupivacaine with dexamethasone 5 mg was injected at each level.     Patient tolerated the procedure well and there were no complications.      Future Management:   If helpful, can repeat as needed. Follow up with me in 4-6 weeks.

## 2019-06-10 NOTE — INTERVAL H&P NOTE
The patient has been examined and the H&P has been reviewed:    I concur with the findings and no changes have occurred since H&P was written.     No change in the location or quality of the pain since the most recent clinic visit.  No new symptoms.  She wishes to proceed with the procedure today.    PE, unchanged from previous:  CV:  RRR  Resp: unlabored, no wheezing.    NPO since MN.      Anesthesia/Surgery risks, benefits and alternative options discussed and understood by patient/family.          Active Hospital Problems    Diagnosis  POA    Lumbosacral spondylosis [M47.817]  Yes      Resolved Hospital Problems   No resolved problems to display.

## 2019-06-25 ENCOUNTER — OFFICE VISIT (OUTPATIENT)
Dept: PAIN MEDICINE | Facility: CLINIC | Age: 64
End: 2019-06-25
Payer: MEDICARE

## 2019-06-25 VITALS
DIASTOLIC BLOOD PRESSURE: 78 MMHG | TEMPERATURE: 98 F | SYSTOLIC BLOOD PRESSURE: 151 MMHG | WEIGHT: 178 LBS | BODY MASS INDEX: 27.94 KG/M2 | HEIGHT: 67 IN | HEART RATE: 58 BPM

## 2019-06-25 DIAGNOSIS — M70.61 GREATER TROCHANTERIC BURSITIS OF RIGHT HIP: ICD-10-CM

## 2019-06-25 DIAGNOSIS — M43.07 LUMBOSACRAL SPONDYLOLYSIS: ICD-10-CM

## 2019-06-25 DIAGNOSIS — Z02.89 PAIN MANAGEMENT CONTRACT SIGNED: ICD-10-CM

## 2019-06-25 DIAGNOSIS — Z98.1 HISTORY OF LUMBAR SPINAL FUSION: ICD-10-CM

## 2019-06-25 DIAGNOSIS — G89.4 CHRONIC PAIN DISORDER: Primary | ICD-10-CM

## 2019-06-25 DIAGNOSIS — Z79.891 OPIOID CONTRACT EXISTS: ICD-10-CM

## 2019-06-25 DIAGNOSIS — M53.3 SACROILIAC JOINT PAIN: ICD-10-CM

## 2019-06-25 DIAGNOSIS — M51.36 DDD (DEGENERATIVE DISC DISEASE), LUMBAR: ICD-10-CM

## 2019-06-25 DIAGNOSIS — Z02.83 ENCOUNTER FOR DRUG SCREENING: ICD-10-CM

## 2019-06-25 DIAGNOSIS — Z79.01 CHRONIC ANTICOAGULATION: ICD-10-CM

## 2019-06-25 PROCEDURE — 99214 PR OFFICE/OUTPT VISIT, EST, LEVL IV, 30-39 MIN: ICD-10-PCS | Mod: 25,S$GLB,, | Performed by: ANESTHESIOLOGY

## 2019-06-25 PROCEDURE — 3077F PR MOST RECENT SYSTOLIC BLOOD PRESSURE >= 140 MM HG: ICD-10-PCS | Mod: CPTII,S$GLB,, | Performed by: ANESTHESIOLOGY

## 2019-06-25 PROCEDURE — 3078F PR MOST RECENT DIASTOLIC BLOOD PRESSURE < 80 MM HG: ICD-10-PCS | Mod: CPTII,S$GLB,, | Performed by: ANESTHESIOLOGY

## 2019-06-25 PROCEDURE — 3008F PR BODY MASS INDEX (BMI) DOCUMENTED: ICD-10-PCS | Mod: CPTII,S$GLB,, | Performed by: ANESTHESIOLOGY

## 2019-06-25 PROCEDURE — 20611 DRAIN/INJ JOINT/BURSA W/US: CPT | Mod: RT,S$GLB,, | Performed by: ANESTHESIOLOGY

## 2019-06-25 PROCEDURE — 99999 PR PBB SHADOW E&M-EST. PATIENT-LVL III: CPT | Mod: PBBFAC,,, | Performed by: ANESTHESIOLOGY

## 2019-06-25 PROCEDURE — 3077F SYST BP >= 140 MM HG: CPT | Mod: CPTII,S$GLB,, | Performed by: ANESTHESIOLOGY

## 2019-06-25 PROCEDURE — 99999 PR PBB SHADOW E&M-EST. PATIENT-LVL III: ICD-10-PCS | Mod: PBBFAC,,, | Performed by: ANESTHESIOLOGY

## 2019-06-25 PROCEDURE — 99214 OFFICE O/P EST MOD 30 MIN: CPT | Mod: 25,S$GLB,, | Performed by: ANESTHESIOLOGY

## 2019-06-25 PROCEDURE — 3078F DIAST BP <80 MM HG: CPT | Mod: CPTII,S$GLB,, | Performed by: ANESTHESIOLOGY

## 2019-06-25 PROCEDURE — 20611 PR DRAIN/ASP/INJECT MAJOR JOINT/BURSA W/US GUIDANCE: ICD-10-PCS | Mod: RT,S$GLB,, | Performed by: ANESTHESIOLOGY

## 2019-06-25 PROCEDURE — 80307 DRUG TEST PRSMV CHEM ANLYZR: CPT

## 2019-06-25 PROCEDURE — 3008F BODY MASS INDEX DOCD: CPT | Mod: CPTII,S$GLB,, | Performed by: ANESTHESIOLOGY

## 2019-06-25 RX ORDER — HYDROCODONE BITARTRATE AND ACETAMINOPHEN 7.5; 325 MG/1; MG/1
1 TABLET ORAL 2 TIMES DAILY PRN
Qty: 60 TABLET | Refills: 0 | Status: SHIPPED | OUTPATIENT
Start: 2019-06-25 | End: 2019-07-22 | Stop reason: SDUPTHER

## 2019-06-25 RX ORDER — BETAMETHASONE SODIUM PHOSPHATE AND BETAMETHASONE ACETATE 3; 3 MG/ML; MG/ML
6 INJECTION, SUSPENSION INTRA-ARTICULAR; INTRALESIONAL; INTRAMUSCULAR; SOFT TISSUE
Status: COMPLETED | OUTPATIENT
Start: 2019-06-25 | End: 2019-06-25

## 2019-06-25 RX ORDER — LIDOCAINE HYDROCHLORIDE 10 MG/ML
3 INJECTION INFILTRATION; PERINEURAL
Status: COMPLETED | OUTPATIENT
Start: 2019-06-25 | End: 2019-06-25

## 2019-06-25 RX ORDER — BUPIVACAINE HYDROCHLORIDE 5 MG/ML
6 INJECTION, SOLUTION EPIDURAL; INTRACAUDAL
Status: COMPLETED | OUTPATIENT
Start: 2019-06-25 | End: 2019-06-25

## 2019-06-25 RX ADMIN — LIDOCAINE HYDROCHLORIDE 3 ML: 10 INJECTION INFILTRATION; PERINEURAL at 05:06

## 2019-06-25 RX ADMIN — BUPIVACAINE HYDROCHLORIDE 30 MG: 5 INJECTION, SOLUTION EPIDURAL; INTRACAUDAL at 05:06

## 2019-06-25 RX ADMIN — BETAMETHASONE SODIUM PHOSPHATE AND BETAMETHASONE ACETATE 6 MG: 3; 3 INJECTION, SUSPENSION INTRA-ARTICULAR; INTRALESIONAL; INTRAMUSCULAR; SOFT TISSUE at 05:06

## 2019-06-25 NOTE — LETTER
July 3, 2019      Cheryl Bradley, NP  [address]  [place] [address]           [facility]  [address] [place] [address]  Phone: [phone]  Fax: [phone]          Patient: Alessia Nelson   MR Number: [id number]   YOB: 1955   Date of Visit: 6/25/2019       Dear Cheryl Bradley:    Thank you for referring Alessia Nelson to me for evaluation. Attached you will find relevant portions of my assessment and plan of care.    If you have questions, please do not hesitate to call me. I look forward to following Alessia Nelson along with you.    Sincerely,    Sneha Aragon MD    Enclosure  CC:  No Recipients    If you would like to receive this communication electronically, please contact [email or web address] or [phone] to request more information on [facility] access.    For providers and/or their staff who would like to refer a patient to [facility], please contact us through our one-stop-shop provider referral line, [facility] [place], at [phone].    If you feel you have received this communication in error or would no longer like to receive these types of communications, please e-mail [email or web address]

## 2019-06-25 NOTE — PROGRESS NOTES
Subjective:     Patient ID: Alessia Nelson is a 63 y.o. female.    Chief Complaint: Pain    Consulted by: Lynne Parsons NP     Disclaimer: This note was generated using voice recognition software.  There may be a typographical errors that were missed during proofreading.      HPI:    Alessia Nelson is a 63 y.o. female who presents today with chronic low back pain.  She has a history of 2 lumbar surgeries in the past as well as a cervical surgery.  She reports bilateral low back pain that radiates into her right buttock and the posterior aspect of her right leg to her knee and sometimes to her calf.  She does have numbness in bilateral feet attributed to diabetic peripheral neuropathy. This is separate from her low back symptoms.   This pain is described in detail below.    Of note, she has had 4 falls, one from her BP dropping, one while she was asleep, and two others of unknown causes.    She carries a diagnosis of fibromyalgia, but she states that she does not believe she has this.    Aggravating factors:  Sitting, standing, walking, bending/twisting, lifting, exercise    Mitigating factors:  Rest, lying down, medication    Previously seeing: Dr. Cardozo, Kyler Watson PA-C, Dr. Brent Lewis    Interval History (6/25/2019):  She returns today for follow up.  She reports that she has not gotten any relief from the radiofrequency ablation yet.  Nothing has been helpful for the pain.    Outside records from [facility]:  Office visit from 04/18/2019:  Show ongoing treatment for her low back pain. Makes a note of a recent SI joint injection with no benefit.  Makes note of an EMG which shows neuropathy.  Shows ongoing treatment with Norco 7.5/325 mg twice daily as needed with no issues.  Other office visits from 02/26/2019, 12/20/2018, 10/25/2018 all show stable treatment with Norco 7.5/325 mg twice daily as needed with no issues.  One office visit from 11/2014 show treatment with Norco 10/325 mg  "with no issues    Physical Therapy:  Yes, just finished 2 weeks, she is doing the HEP (stretching) most days.  She is planning to get in her pool, but she has not cleaned it yet    Non-pharmacologic Treatment:     · Ice/Heat: Heat is helpful (her dog lies on her back)  · TENS: Tried at the chiropractor  · Massage: Yes, in the past, helpful  · Chiropractic care:  Yes, in the past, helpful  · Acupuncture: She is interested in trying this.  · Other: Uses a body pillow for sleep         Pain Medications:         · Currently taking: Tylenol 9602-3753 BID PRN (takes it for before the Norco), Norco 7.5/325 mg BID PRN (she tries to limit this as much as possible), methocarbamol 750 mg QHS, gabapentin 400 mg BID, trazodone 100 mg QHS, Wellbutrin, Lexapro    · Has tried in the past:    · Opioids: Oxycodone (doesn't like)  · NSAIDS: Celebrex (caused diarrhea)  · Tylenol: Tylenol 5846-6702   · Muscle relaxants: tizanidine (caused SE of not remembering and seeing things)  · TCAs: Recently stopped amitriptyline  · SNRIs: Not tried  · Anticonvulsants: On gabapentin  · topical creams: Lotion for nighttime, bath oil  · Other: Lorazepam (stopped this)    Blood thinners: Plavix for     Interventional Therapies:   · L4/5 Epidural steroid injection: For many years.  She is unsure of the last one  · Right SI joint: No benefit  · Right L5-S3 MBB: positive    Relevant Surgeries:   · 2006: Lumbar surgery  · 2007: Lumbar surgery  · 2012: Lumbar surgery, PISF  · 2006: Cervical surgery    Affecting sleep? Yes, she cannot sleep due to "brain not turning off"    Affecting daily activities? Yes, she is unable to mop, cook, or do most activities    Depressive symptoms? Yes, she has been diagnosed with depression          · SI/HI? No    Work status: Criminal Justice, disabled due to pain    Prescription Monitoring Program database:  Reviewed and consistent with medication use as prescribed.    Last 3 PDI Scores 6/25/2019 5/21/2019   Pain Disability " Index (PDI) 22 41       Opioid Risk Score       Value Time User    Opioid Risk Score  4 5/21/2019  3:57 PM Clare Alfonso MA          GENERAL:  Positive for fatigue.  No weight loss, malaise or fevers.  HEENT:   No recent changes in vision or hearing  NECK:  Negative for lumps, no difficulty with swallowing.  RESPIRATORY:  Positive for cough.  Negative for wheezing or shortness of breath, patient denies any recent URI.  CARDIOVASCULAR:  Negative for chest pain, leg swelling or palpitations.  GI:  Positive for diarrhea.  Negative for abdominal discomfort, blood in stools or black stools or change in bowel habits.  MUSCULOSKELETAL:  See HPI.  SKIN:  Positive for color change, texture change.  Negative for lesions, rash, and itching.  PSYCH:  Positive for anxiety.  No other mood disorder or recent psychosocial stressors.    HEMATOLOGY/LYMPHOLOGY:  Positive for easy bruising.  Negative for prolonged bleeding or swollen nodes.    ENDO:  Positive for hypothyroidism and diabetes.    NEURO:   Positive for headaches, head trauma (due to recent fall), loss of balance, difficulty sleeping.  No history of syncope, paralysis, seizures or tremors.  All other reviewed and negative other than HPI.          Past Medical History:   Diagnosis Date    Acute pancreatitis     CHF (congestive heart failure)     COPD (chronic obstructive pulmonary disease)     Depression     Diverticulosis     GERD (gastroesophageal reflux disease)     Hypertension     Thyroid disease        Past Surgical History:   Procedure Laterality Date    ADRENAL GLAND SURGERY      CHOLECYSTECTOMY      COLONOSCOPY      HERNIA REPAIR      HYSTERECTOMY      INCISIONAL HERNIA REPAIR      instestine      PARATHYROID GLAND SURGERY      3 surgeries    Radiofrequency Ablation - RIGHT L3-5 RADIOFREQUENCY ABLATION WITH Crypt[employer] Software[employer]ARD COOLIEF THERMAL SYSTEM Right 6/10/2019    [facility]    RIGHT L5-S3 MEDIAL BRANCH BLOCKS  Right 5/27/2019    [facility] OR    UPPER GASTROINTESTINAL ENDOSCOPY         Review of patient's allergies indicates:   Allergen Reactions    Aspirin     Lortab [hydrocodone-acetaminophen] Itching    Phenergan [promethazine]     Promethazine hcl        Current Outpatient Medications   Medication Sig Dispense Refill    acetaminophen (TYLENOL) 325 MG tablet Take 2 tablets (650 mg total) by mouth every 6 (six) hours as needed (or equal to 101 degree F).      allopurinol (ZYLOPRIM) 300 MG tablet       amlodipine-benazepril 5-10 mg (LOTREL) 5-10 mg per capsule       buPROPion (WELLBUTRIN XL) 150 MG TB24 tablet Take 1 tablet (150 mg total) by mouth once daily. 30 tablet 1    clopidogrel (PLAVIX) 75 mg tablet Take 1 tablet (75 mg total) by mouth once daily. 14 tablet 0    colestipol (COLESTID) 1 gram Tab       cyanocobalamin 1,000 mcg/mL injection Inject 1 mL (1,000 mcg total) into the muscle every 14 (fourteen) days. Take 1 injection every other week for 6 doses 6 mL 2    escitalopram (LEXAPRO) 20 MG tablet Take 20 mg by mouth once daily.      gabapentin (NEURONTIN) 400 MG capsule       glycopyrrolate (ROBINUL) 1 mg Tab TK 1 T PO BID 90 tablet 3    HYDROcodone-acetaminophen (NORCO) 7.5-325 mg per tablet TK 1 T PO  BID PRN PAIN  0    levothyroxine (SYNTHROID) 50 MCG tablet Take 50 mcg by mouth once daily.      methocarbamol (ROBAXIN) 750 MG Tab       metoprolol tartrate (LOPRESSOR) 100 MG tablet Take 1 tablet (100 mg total) by mouth 2 (two) times daily. 90 tablet 3    ondansetron (ZOFRAN) 4 MG tablet Take 8 mg by mouth every 8 (eight) hours.      pantoprazole (PROTONIX) 40 MG tablet Take 40 mg by mouth once daily.      potassium chloride SA (K-DUR,KLOR-CON) 20 MEQ tablet Take 1 tablet (20 mEq total) by mouth once daily. 30 tablet 3    rosuvastatin (CRESTOR) 40 MG Tab Take 1 tablet (40 mg total) by mouth once daily. 90 tablet 3    traZODone (DESYREL) 100 MG tablet Take 1  "tablet (100 mg total) by mouth every evening. 30 tablet 2     No current facility-administered medications for this visit.        Family History   Problem Relation Age of Onset    Stroke Maternal Grandmother     Cancer Maternal Grandfather     Stroke Mother     Heart disease Father        Social History     Socioeconomic History    Marital status: Single     Spouse name: Not on file    Number of children: 0    Years of education: Not on file    Highest education level: Not on file   Occupational History    Not on file   Social Needs    Financial resource strain: Not on file    Food insecurity:     Worry: Not on file     Inability: Not on file    Transportation needs:     Medical: Not on file     Non-medical: Not on file   Tobacco Use    Smoking status: Current Every Day Smoker     Packs/day: 1.00     Years: 15.00     Pack years: 15.00    Smokeless tobacco: Never Used   Substance and Sexual Activity    Alcohol use: No    Drug use: No    Sexual activity: Not Currently   Lifestyle    Physical activity:     Days per week: Not on file     Minutes per session: Not on file    Stress: Not on file   Relationships    Social connections:     Talks on phone: Not on file     Gets together: Not on file     Attends Sikhism service: Not on file     Active member of club or organization: Not on file     Attends meetings of clubs or organizations: Not on file     Relationship status: Not on file   Other Topics Concern    Not on file   Social History Narrative    Not on file       Objective:     Vitals:    06/25/19 1526   BP: (!) 151/78   Pulse: (!) 58   Temp: 98.2 °F (36.8 °C)   TempSrc: Oral   Weight: 80.7 kg (178 lb)   Height: 5' 7" (1.702 m)   PainSc:   7       GEN:  Well developed, well nourished.  No acute distress. No pain behavior.  HEENT:  No trauma.  Mucous membranes moist.  Nares patent bilaterally.  PSYCH: Normal affect. Thought content appropriate.  CHEST:  Breathing symmetric.  No audible " wheezing.  ABD: Soft, non-distended.  SKIN:  Warm, pink, dry.  No rash on exposed areas.    EXT:  No cyanosis, clubbing, or edema.  No color change or changes in nail or hair growth.  NEURO/MUSCULOSKELETAL:  Fully alert, oriented, and appropriate. Speech normal kevin. No cranial nerve deficits.   Gait: Antalgic.    No TTP over right lumbar paraspinals, right SI joint.  TTP over right GTB    Previous physical exam:  Present trendelenburg sign bilaterally.   Motor Strength: 5/5 motor strength throughout lower extremities.   Sensory: Decreased sensation in bilateral soles of her feet equally.  Increased sensation in a right L5.   Reflexes:  1+ and symmetric throughout.  Downgoing Babinski's bilaterally.  No clonus or spasticity.  L-Spine:  Decreased ROM with pain on flexion. Minimal pain with axial/facet loading bilaterally.  Positive SLR on the left for pain in an S1  SI Joint/Hip: Positive MIKEY on the right.  Negative MIKEY on the left and FADIR bilaterally.  TTP over lumbar paraspinals, right SI joint       Imaging:      The imaging studies listed below were independently reviewed by me, and I agree with the findings as documented below.     MRIs have been done today, but the reports are not yet available.  Pre for review of the MRI of the lumbar spine shows posterior instrumented spinal fusion at L4/5 with some residual degenerative disc disease at that level with adjacent endplate edema at L4 and L5. Otherwise, the MRI looks largely unremarkable.      Assessment:     Encounter Diagnoses   Name Primary?    Chronic pain disorder Yes    DDD (degenerative disc disease), lumbar     Lumbosacral spondylolysis     History of lumbar spinal fusion     Sacroiliac joint pain     Chronic anticoagulation     Encounter for drug screening     Greater trochanteric bursitis of right hip     Opioid contract exists     Pain management contract signed        Plan:     Alessia was seen today for follow-up.    Diagnoses and  all orders for this visit:    Chronic pain disorder  -     HYDROcodone-acetaminophen (NORCO) 7.5-325 mg per tablet; Take 1 tablet by mouth 2 (two) times daily as needed for Pain.    DDD (degenerative disc disease), lumbar  -     HYDROcodone-acetaminophen (NORCO) 7.5-325 mg per tablet; Take 1 tablet by mouth 2 (two) times daily as needed for Pain.    Lumbosacral spondylolysis  -     HYDROcodone-acetaminophen (NORCO) 7.5-325 mg per tablet; Take 1 tablet by mouth 2 (two) times daily as needed for Pain.    History of lumbar spinal fusion  -     HYDROcodone-acetaminophen (NORCO) 7.5-325 mg per tablet; Take 1 tablet by mouth 2 (two) times daily as needed for Pain.    Sacroiliac joint pain  -     HYDROcodone-acetaminophen (NORCO) 7.5-325 mg per tablet; Take 1 tablet by mouth 2 (two) times daily as needed for Pain.    Chronic anticoagulation    Encounter for drug screening  -     [facility] Drug Screen    Greater trochanteric bursitis of right hip  -     lidocaine HCL 10 mg/ml (1%) injection 3 mL  -     bupivacaine (PF) 0.5% (5 mg/mL) injection 30 mg  -     betamethasone acetate-betamethasone sodium phosphate injection 6 mg    Opioid contract exists    Pain management contract signed         She presents today with a longstanding chronic pain history with several recent medical issues that are complicating the picture.  We will work to control her pain with as few sedating medications as possible.  It is reasonable to think that these may have had something to do with her falls given her symptoms of amnesia surrounding the event, lightheadedness, and confusion.  Today, her primary pain appears to be coming from her right SI joint      We discussed the assessment and recommendations.  All available images were reviewed. We discussed the disease process, prognosis, treatment plan, and risks and benefits. The patient is aware of the risks and benefits of the medications being prescribed, common side effects, and proper usage.  The following is the plan we agreed on:     1. Can repeat right L5-S3 RFA PRN  2. Right GTB injection today as below  3. Outside records reviewed.  We will plan to continue the Norco 7.5/325 mg twice daily as needed.  Currently, the benefits outweigh the risks.  We had a long discussion regarding the risks and benefits of narcotic therapy.  We will work to minimize these as much as possible.  [facility] reviewed and is appropriate.  Opioid contract signed today.  UDS done today.  4. We had an extensive discussion regarding the expected time course for PT for chronic pain vs after a surgery.  Specifically, we discussed that PT for chronic pain almost always causes a worsening of pain before any improvement, which is generally not seen for 3-6 months, in which time she will likely already be home doing home exercises given that a formal course of PT generally lasts anywhere from 6-12 weeks.  5. Recommend she obtain a foam roller for home use for her bursitis  6. She would be a good candidate for FRP in the future  7. RTC in 4 weeks or sooner if needed.    Date of Procedure: 06/25/2019    Procedure: Right greater trochanteric bursa injection under ultrasound guidance    Pre-op diagnosis: Right greater trochanteric bursitis    Post-op diagnosis: Same     Physician: Dr. Sneha Aragon     Assistant: None    Anesthestia: Local    EBL: None    Specimens: None    All medications, allergies, and relevant histories were reviewed. No recent antibiotics or infections.  A time-out was taken to verify the correct patient, procedure, laterality, and appropriate medications/allergies.    Right Greater Trochanteric Bursa Injection-  After informed consent was taken patient is placed in left lateral decubitus position and the hip area exposed. Patient was prepped in sterile fashion. Ultrasound guidance was used to locate the right greater trochanter and then using a 27-gauge 1.5 inch needle local anesthetic was injected to make a  skin wheal. Then a 22-gauge 3.5 inch spinal needle was introduced into the greater trochanteric bursa under ultrasound guidance. After negative aspiration, and no paresthesia, a 7 ml solution containing 0.5% bupivacaine and 6 mg of betamethasone was intermittently injected. Ultrasound showed good spread of local anesthetic.     Patient tolerated the procedure well and was discharged in excellent condition.    Thank you for allowing me to participate in the care of this patient.   Please do not hesitate to call me at [phone] with any questions or concerns.    Sneha Aragon MD  06/25/2019     The above plan and management options were discussed at length with patient. Patient is in agreement with the above and verbalized understanding. It will be communicated with the referring physician via electronic record, fax, or mail.

## 2019-06-25 NOTE — PATIENT INSTRUCTIONS
"To get and use a foam roller for your IT band:     Type "foam roller for it band" into [employer]. A video named "How to Use a Foam Roller to Release Your IT Band" (featuring a woman in a red shirt) will come up on how to use a foam roller to stretch your IT band. This will help with your bursitis. Also, links will appear where you can order a foam roller to use. Recommend purchasing one around $20 that is smooth (no lumps or grooves). I recommend the [employer] Foam Roller, 18", which is available from multiple retailers for around $20.              "

## 2019-06-28 LAB
6MAM UR QL: NOT DETECTED
7AMINOCLONAZEPAM UR QL: NOT DETECTED
A-OH ALPRAZ UR QL: NOT DETECTED
ALPRAZ UR QL: NOT DETECTED
AMPHET UR QL SCN: NOT DETECTED
BARBITURATES UR QL: NOT DETECTED
BUPRENORPHINE UR QL: NOT DETECTED
BZE UR QL: NOT DETECTED
CARBOXYTHC UR QL: NOT DETECTED
CARISOPRODOL UR QL: NOT DETECTED
CLONAZEPAM UR QL: NOT DETECTED
CODEINE UR QL: NOT DETECTED
CREAT UR-MCNC: 280.1 MG/DL (ref 20–400)
DIAZEPAM UR QL: NOT DETECTED
ETHYL GLUCURONIDE UR QL: NOT DETECTED
FENTANYL UR QL: NOT DETECTED
HYDROCODONE UR QL: PRESENT
HYDROMORPHONE UR QL: NOT DETECTED
LORAZEPAM UR QL: NOT DETECTED
MDA UR QL: NOT DETECTED
MDEA UR QL: NOT DETECTED
MDMA UR QL: NOT DETECTED
ME-PHENIDATE UR QL: NOT DETECTED
MEPERIDINE UR QL: NOT DETECTED
METHADONE UR QL: NOT DETECTED
METHAMPHET UR QL: NOT DETECTED
MIDAZOLAM UR QL SCN: NOT DETECTED
MORPHINE UR QL: NOT DETECTED
NORBUPRENORPHINE UR QL CFM: NOT DETECTED
NORDIAZEPAM UR QL: NOT DETECTED
NORFENTANYL UR QL: NOT DETECTED
NORHYDROCODONE UR QL CFM: NOT DETECTED
NOROXYCODONE UR QL CFM: NOT DETECTED
NOROXYMORPHONE: NOT DETECTED
OXAZEPAM UR QL: NOT DETECTED
OXYCODONE UR QL: NOT DETECTED
OXYMORPHONE UR QL: NOT DETECTED
PATHOLOGY STUDY: NORMAL
PCP UR QL: NOT DETECTED
PHENTERMINE UR QL: NOT DETECTED
PROPOXYPH UR QL: NOT DETECTED
SERVICE CMNT-IMP: NORMAL
TAPENTADOL UR QL SCN: NOT DETECTED
TAPENTADOL-O-SULF: NOT DETECTED
TEMAZEPAM UR QL: NOT DETECTED
TRAMADOL UR QL: NOT DETECTED
ZOLPIDEM UR QL: NOT DETECTED

## 2019-07-02 ENCOUNTER — TELEPHONE (OUTPATIENT)
Dept: GASTROENTEROLOGY | Facility: CLINIC | Age: 64
End: 2019-07-02

## 2019-07-02 RX ORDER — BUPROPION HYDROCHLORIDE 150 MG/1
150 TABLET ORAL DAILY
Qty: 90 TABLET | Refills: 3 | Status: SHIPPED | OUTPATIENT
Start: 2019-07-02 | End: 2020-06-01

## 2019-07-02 NOTE — TELEPHONE ENCOUNTER
----- Message from Page Addison sent at 7/2/2019  3:58 PM CDT -----  Contact: Patient  Type: Needs Medical Advice    Who Called:  Patient  Best Call Back Number: [phone] (home)   Additional Information: Need Dr to send a renewal Rx to [employer] for 90 day supply for Bupropion  mg tablets not 60 day per [employer] patient said to please call her to confirm

## 2019-07-02 NOTE — TELEPHONE ENCOUNTER
----- Message from Page Addison sent at 7/2/2019  4:03 PM CDT -----  Contact: Patient  Type: Needs Medical Advice    Who Called:  Patient  Best Call Back Number: [phone] (home)   Additional Information: patient would like to make an appt she has a referral would like a call back to schedule asap declined the next avail in December

## 2019-07-15 ENCOUNTER — TELEPHONE (OUTPATIENT)
Dept: NEUROSURGERY | Facility: CLINIC | Age: 64
End: 2019-07-15

## 2019-07-16 ENCOUNTER — OFFICE VISIT (OUTPATIENT)
Dept: NEUROSURGERY | Facility: CLINIC | Age: 64
End: 2019-07-16
Payer: MEDICARE

## 2019-07-16 ENCOUNTER — HOSPITAL ENCOUNTER (OUTPATIENT)
Dept: RADIOLOGY | Facility: HOSPITAL | Age: 64
Discharge: HOME | End: 2019-07-16
Attending: NEUROLOGICAL SURGERY
Payer: MEDICARE

## 2019-07-16 VITALS
HEART RATE: 50 BPM | TEMPERATURE: 98 F | SYSTOLIC BLOOD PRESSURE: 133 MMHG | DIASTOLIC BLOOD PRESSURE: 58 MMHG | BODY MASS INDEX: 44.56 KG/M2 | WEIGHT: 284.5 LBS

## 2019-07-16 DIAGNOSIS — M47.819 FACET JOINT DISEASE: ICD-10-CM

## 2019-07-16 DIAGNOSIS — M47.819 FACET JOINT DISEASE: Primary | ICD-10-CM

## 2019-07-16 PROCEDURE — 3008F PR BODY MASS INDEX (BMI) DOCUMENTED: ICD-10-PCS | Mod: CPTII,S$GLB,, | Performed by: NEUROLOGICAL SURGERY

## 2019-07-16 PROCEDURE — 72110 X-RAY EXAM L-2 SPINE 4/>VWS: CPT | Mod: TC

## 2019-07-16 PROCEDURE — 99214 PR OFFICE/OUTPT VISIT, EST, LEVL IV, 30-39 MIN: ICD-10-PCS | Mod: S$GLB,,, | Performed by: NEUROLOGICAL SURGERY

## 2019-07-16 PROCEDURE — 3078F DIAST BP <80 MM HG: CPT | Mod: CPTII,S$GLB,, | Performed by: NEUROLOGICAL SURGERY

## 2019-07-16 PROCEDURE — 3075F PR MOST RECENT SYSTOLIC BLOOD PRESS GE 130-139MM HG: ICD-10-PCS | Mod: CPTII,S$GLB,, | Performed by: NEUROLOGICAL SURGERY

## 2019-07-16 PROCEDURE — 72110 XR LUMBAR SPINE 5 VIEW WITH FLEX AND EXT: ICD-10-PCS | Mod: 26,,, | Performed by: RADIOLOGY

## 2019-07-16 PROCEDURE — 3008F BODY MASS INDEX DOCD: CPT | Mod: CPTII,S$GLB,, | Performed by: NEUROLOGICAL SURGERY

## 2019-07-16 PROCEDURE — 3078F PR MOST RECENT DIASTOLIC BLOOD PRESSURE < 80 MM HG: ICD-10-PCS | Mod: CPTII,S$GLB,, | Performed by: NEUROLOGICAL SURGERY

## 2019-07-16 PROCEDURE — 3075F SYST BP GE 130 - 139MM HG: CPT | Mod: CPTII,S$GLB,, | Performed by: NEUROLOGICAL SURGERY

## 2019-07-16 PROCEDURE — 99214 OFFICE O/P EST MOD 30 MIN: CPT | Mod: S$GLB,,, | Performed by: NEUROLOGICAL SURGERY

## 2019-07-16 PROCEDURE — 99999 PR PBB SHADOW E&M-EST. PATIENT-LVL III: ICD-10-PCS | Mod: PBBFAC,,, | Performed by: NEUROLOGICAL SURGERY

## 2019-07-16 PROCEDURE — 72110 X-RAY EXAM L-2 SPINE 4/>VWS: CPT | Mod: 26,,, | Performed by: RADIOLOGY

## 2019-07-16 PROCEDURE — 99999 PR PBB SHADOW E&M-EST. PATIENT-LVL III: CPT | Mod: PBBFAC,,, | Performed by: NEUROLOGICAL SURGERY

## 2019-07-16 NOTE — LETTER
July 16, 2019      Brent Lewis MD  [address]  [place] [address]           [facility] 7th Fl  [address]  [facility] [address]  Phone: [phone]          Patient: Alessia Nelson   MR Number: [id number]   YOB: 1955   Date of Visit: 7/16/2019       Dear Dr. Brent Lewis:    Thank you for referring Alessia Nelson to me for evaluation. Attached you will find relevant portions of my assessment and plan of care.    If you have questions, please do not hesitate to call me. I look forward to following Alessia Nelson along with you.    Sincerely,    Jovanni Yuan MD    Enclosure  CC:  No Recipients    If you would like to receive this communication electronically, please contact [email or web address] or [phone] to request more information on [employer] access.    For providers and/or their staff who would like to refer a patient to [facility], please contact us through our one-stop-shop provider referral line, [facility], at [phone].    If you feel you have received this communication in error or would no longer like to receive these types of communications, please e-mail [email or web address]

## 2019-07-16 NOTE — PROGRESS NOTES
Dear Dr. Arreaga,       Thank you for referring this patient to my clinic. The full details of my evaluation will also be forthcoming to you by letter.    CHIEF COMPLAINT:  Consult for low back    I, Scot Osborne, attest that this documentation has been prepared under the direction and in the presence of Jovanni Yuan MD.    HPI:  Alessia Nelson is a 63 y.o.  female with history of COPD, CHF, pancreatitis, and tobacco abuse, on Plavix, who is referred to me by Dr. Arreaga for evaluation of low back. Pt reports low back and RLE pain. Her back pain is worse than her RLE pain. The low back pain is more right sided and occasionally radiates down her right buttock and hip. Her last surgery was performed in 2012 by a doctor in [place]. This surgery was performed to treat back pain. It initially provided relief. Pt states that she falls often due to syncope upon standing. Pt has received several injections this year with the most relief from radiofrequency ablation and a hip injection for bursitis. She did not experience any relief following her SI joint injections. Pt notes a fall in January after which her low back pain worsened. She presented to the ED days after and one doctor mentioned a small fracture but radiology did not agree. Pt states that she fell from a treehouse when she was a child fracturing her ankle requiring surgery. At that time she participated in physical therapy with significant lasting relief.       Review of patient's allergies indicates:   Allergen Reactions    Aspirin     Lortab [hydrocodone-acetaminophen] Itching    Phenergan [promethazine]     Promethazine hcl        Past Medical History:   Diagnosis Date    Acute pancreatitis     CHF (congestive heart failure)     COPD (chronic obstructive pulmonary disease)     Depression     Diverticulosis     GERD (gastroesophageal reflux disease)     Hypertension     Thyroid disease      Past Surgical History:   Procedure Laterality Date     ADRENAL GLAND SURGERY      CHOLECYSTECTOMY      COLONOSCOPY      HERNIA REPAIR      HYSTERECTOMY      INCISIONAL HERNIA REPAIR      instestine      PARATHYROID GLAND SURGERY      3 surgeries    Radiofrequency Ablation - RIGHT L3-5 RADIOFREQUENCY ABLATION WITH sfilatinoYARD COOLIEF THERMAL SYSTEM Right 6/10/2019    Performed [facility] at [facility]    RIGHT L5-S3 MEDIAL BRANCH BLOCKS Right 5/27/2019    [facility]    UPPER GASTROINTESTINAL ENDOSCOPY       Family History   Problem Relation Age of Onset    Stroke Maternal Grandmother     Cancer Maternal Grandfather     Stroke Mother     Heart disease Father      Social History     Tobacco Use    Smoking status: Current Every Day Smoker     Packs/day: 3.00     Years: 15.00     Pack years: 45.00    Smokeless tobacco: Current User   Substance Use Topics    Alcohol use: No    Drug use: No        Review of Systems   Constitutional: Negative.    HENT: Negative.    Eyes: Negative.    Respiratory: Negative.    Cardiovascular: Negative.    Gastrointestinal: Negative.    Endocrine: Negative.    Genitourinary: Negative.    Musculoskeletal: Positive for back pain (low back) and myalgias (right buttock/hip). Negative for gait problem and neck pain.   Skin: Negative.    Allergic/Immunologic: Negative.    Neurological: Positive for syncope (intermittent). Negative for weakness, light-headedness, numbness and headaches.   Hematological: Negative.    Psychiatric/Behavioral: Negative.        OBJECTIVE:   Vital Signs:  Temp: 98.3 °F (36.8 °C) (07/16/19 1237)  Pulse: (!) 50 (07/16/19 1237)  BP: (!) 133/58 (07/16/19 1237)    Physical Exam:    Vital signs: All nursing notes and vital signs reviewed -- afebrile, vital signs stable.  Constitutional: Patient sitting comfortably in chair. Appears well developed and well nourished.  Skin: Exposed areas are intact without abnormal markings, rashes or other lesions.  HEENT: Normocephalic.  Normal conjunctivae.  Cardiovascular: Normal rate and regular rhythm.  Respiratory: Chest wall rises and falls symmetrically, without signs of respiratory distress.  Abdomen: Soft and non-tender.  Extremities: Warm and without edema. Calves supple, non-tender.  Psych/Behavior: Normal affect.    Neurological:    Mental status: Alert and oriented. Conversational and appropriate.       Cranial Nerves: Grossly intact.     Motor:    Upper:  Deltoids Triceps Biceps WE WF     R 5/5 5/5 5/5 5/5 5/5 5/5    L 5/5 5/5 5/5 5/5 5/5 5/5      Lower:  HF KE KF DF PF EHL    R 5/5 5/5 5/5 5/5 5/5 5/5    L 5/5 5/5 5/5 5/5 5/5 5/5     Sensory: Intact sensation to light touch in all extremities. Romberg negative.    Reflexes:          DTR: 2+ symmetrically throughout.     Loredo's: Negative.     Babinski's: Negative.     Clonus: Negative.    Cerebellar: Finger-to-nose and rapid alternating movements normal. Gait stable, fluid.    Spine:    Posture: Head well aligned over pelvis in front and side views.  No focal or global spinal deformity visible on inspection. Shoulders and hips even. No obvious leg length discrepancy. No scapula winging.    Bending: Full ROM with forward, back and lateral bending. No rib prominence with forward bend.    Cervical:      ROM: Full with flexion, extension, lateral rotation and ear-to-shoulder bend.      Midline TTP: Negative.     Spurling's test: Negative.     Lhermitte's: Negative.    Thoracic:     Midline TTP: Negative    Lumbar:     Midline TTP: Negative     Straight Leg Test: Negative     Crossed Straight Leg Test: Negative     Sciatic notch tenderness: Negative.    Other:     SI joint TTP: Negative.     Greater trochanter TTP: Negative.     Tenderness with external/internal hip rotation: Negative.    Diagnostic Results:  All imaging was independently reviewed by me.    MRI L-spine, dated 5/21/2019:  1. Previous L4/5 instrumented fusion  2. L4 and L5 laminectomy  3. Moderate right L4/5 and L5/S1  neuroforaminal stenosis     CT C-spine, dated 1/28/2019:  1. Prior C5-7 ACD    CT Abdomen Pelvis, dated 4/18/2019:  1. Chronic appearing right L5/S1 facet fracture  2. Air in bilateral SI joints  3. Probable interbody fusion at L4/5   4. Good hardware position  5. No lucencies    ASSESSMENT/PLAN:     Alessia Nelson has chronic axial low back pain which localizes to the right lumbosacral facets on exam. Imaging shows a chronic appearing linear fracture in the inferior articulating process of R L5, at the L5-S1 facet. I suspect this is the pain source and explains why she has had partial relief in the past with RFAs in the region. I recommend dynamic xrays to rule out instability and an NM bone scan prior to another round of injections. Follow up with me will be PRN.    The patient understands and agrees with the plan of care. All questions were answered.     1. Flex/Ex L-spine  2. NM Bone Scan  3. Follow up with Pain Management for injections based on NM bone scan results  4. RTC PRN      I, Dr. Jovanni Yuan personally performed the services described in this documentation. All medical record entries made by the scribe, Scot Osborne, were at my direction and in my presence.  I have reviewed the chart and agree that the record reflects my personal performance and is accurate and complete.      Jovanni Yuan M.D.  Department of Neurosurgery  [facility]      .

## 2019-07-17 DIAGNOSIS — K22.4 ESOPHAGEAL SPASM: ICD-10-CM

## 2019-07-17 RX ORDER — GLYCOPYRROLATE 1 MG/1
TABLET ORAL
Qty: 90 TABLET | Refills: 3 | Status: SHIPPED | OUTPATIENT
Start: 2019-07-17 | End: 2019-12-04 | Stop reason: SDUPTHER

## 2019-07-17 NOTE — TELEPHONE ENCOUNTER
----- Message from Moshe ROBIN Oscarailyn sent at 7/17/2019  9:48 AM CDT -----  Contact: same  Patient called in and stated she has an appt on 7/24/19 but has run out of her Rx medication Glycopyrrolate 1 mg tablets, 1 tablet-2 x daily.  This Rx was written by her previous gastro.    Patient will need a 2 week supply sent locally because she is completely out.  Patient will also need a 90 day supply sent over [employer] Mail order.    [facility] [address] [place] [address] AT [facility] & [facility]  [address] [place] [id number]  Phone: [phone] Fax: [phone]    Patient call back number is [phone]

## 2019-07-22 ENCOUNTER — OFFICE VISIT (OUTPATIENT)
Dept: PAIN MEDICINE | Facility: CLINIC | Age: 64
End: 2019-07-22
Payer: MEDICARE

## 2019-07-22 VITALS
DIASTOLIC BLOOD PRESSURE: 59 MMHG | TEMPERATURE: 98 F | HEART RATE: 55 BPM | HEIGHT: 67 IN | BODY MASS INDEX: 29.03 KG/M2 | SYSTOLIC BLOOD PRESSURE: 119 MMHG | RESPIRATION RATE: 16 BRPM | WEIGHT: 185 LBS

## 2019-07-22 DIAGNOSIS — M53.3 SACROILIAC JOINT PAIN: ICD-10-CM

## 2019-07-22 DIAGNOSIS — Z79.891 OPIOID CONTRACT EXISTS: ICD-10-CM

## 2019-07-22 DIAGNOSIS — G89.4 CHRONIC PAIN DISORDER: Primary | ICD-10-CM

## 2019-07-22 DIAGNOSIS — M43.07 LUMBOSACRAL SPONDYLOLYSIS: ICD-10-CM

## 2019-07-22 DIAGNOSIS — M70.61 GREATER TROCHANTERIC BURSITIS OF RIGHT HIP: ICD-10-CM

## 2019-07-22 DIAGNOSIS — Z98.1 HISTORY OF LUMBAR SPINAL FUSION: ICD-10-CM

## 2019-07-22 DIAGNOSIS — Z79.01 CHRONIC ANTICOAGULATION: ICD-10-CM

## 2019-07-22 DIAGNOSIS — M51.36 DDD (DEGENERATIVE DISC DISEASE), LUMBAR: ICD-10-CM

## 2019-07-22 PROCEDURE — 3078F DIAST BP <80 MM HG: CPT | Mod: CPTII,S$GLB,, | Performed by: ANESTHESIOLOGY

## 2019-07-22 PROCEDURE — 3078F PR MOST RECENT DIASTOLIC BLOOD PRESSURE < 80 MM HG: ICD-10-PCS | Mod: CPTII,S$GLB,, | Performed by: ANESTHESIOLOGY

## 2019-07-22 PROCEDURE — 99999 PR PBB SHADOW E&M-EST. PATIENT-LVL III: ICD-10-PCS | Mod: PBBFAC,,, | Performed by: ANESTHESIOLOGY

## 2019-07-22 PROCEDURE — 3074F PR MOST RECENT SYSTOLIC BLOOD PRESSURE < 130 MM HG: ICD-10-PCS | Mod: CPTII,S$GLB,, | Performed by: ANESTHESIOLOGY

## 2019-07-22 PROCEDURE — 99214 PR OFFICE/OUTPT VISIT, EST, LEVL IV, 30-39 MIN: ICD-10-PCS | Mod: S$GLB,,, | Performed by: ANESTHESIOLOGY

## 2019-07-22 PROCEDURE — 99214 OFFICE O/P EST MOD 30 MIN: CPT | Mod: S$GLB,,, | Performed by: ANESTHESIOLOGY

## 2019-07-22 PROCEDURE — 3008F BODY MASS INDEX DOCD: CPT | Mod: CPTII,S$GLB,, | Performed by: ANESTHESIOLOGY

## 2019-07-22 PROCEDURE — 3074F SYST BP LT 130 MM HG: CPT | Mod: CPTII,S$GLB,, | Performed by: ANESTHESIOLOGY

## 2019-07-22 PROCEDURE — 3008F PR BODY MASS INDEX (BMI) DOCUMENTED: ICD-10-PCS | Mod: CPTII,S$GLB,, | Performed by: ANESTHESIOLOGY

## 2019-07-22 PROCEDURE — 99999 PR PBB SHADOW E&M-EST. PATIENT-LVL III: CPT | Mod: PBBFAC,,, | Performed by: ANESTHESIOLOGY

## 2019-07-22 RX ORDER — HYDROCODONE BITARTRATE AND ACETAMINOPHEN 7.5; 325 MG/1; MG/1
1 TABLET ORAL 2 TIMES DAILY PRN
Qty: 60 TABLET | Refills: 0 | Status: SHIPPED | OUTPATIENT
Start: 2019-07-23 | End: 2019-08-20 | Stop reason: SDUPTHER

## 2019-07-22 NOTE — PROGRESS NOTES
Subjective:     Patient ID: Alessia Nelson is a 63 y.o. female.    Chief Complaint: Pain    Consulted by: Lynne Parsons NP     Disclaimer: This note was generated using voice recognition software.  There may be a typographical errors that were missed during proofreading.      HPI:    Alessia Nelson is a 63 y.o. female who presents today with chronic low back pain.  She has a history of 2 lumbar surgeries in the past as well as a cervical surgery.  She reports bilateral low back pain that radiates into her right buttock and the posterior aspect of her right leg to her knee and sometimes to her calf.  She does have numbness in bilateral feet attributed to diabetic peripheral neuropathy. This is separate from her low back symptoms.   This pain is described in detail below.    Of note, she has had 4 falls, one from her BP dropping, one while she was asleep, and two others of unknown causes.    She carries a diagnosis of fibromyalgia, but she states that she does not believe she has this.    Aggravating factors:  Sitting, standing, walking, bending/twisting, lifting, exercise    Mitigating factors:  Rest, lying down, medication    Previously seeing: Dr. Cardozo, Kyler Watson PA-C, Dr. Brent Lewis    Interval History (6/25/2019):  She returns today for follow up.  She reports that she has not gotten any relief from the radiofrequency ablation yet.  Nothing has been helpful for the pain.    Outside records from [facility]:  Office visit from 04/18/2019:  Show ongoing treatment for her low back pain. Makes a note of a recent SI joint injection with no benefit.  Makes note of an EMG which shows neuropathy.  Shows ongoing treatment with Norco 7.5/325 mg twice daily as needed with no issues.  Other office visits from 02/26/2019, 12/20/2018, 10/25/2018 all show stable treatment with Norco 7.5/325 mg twice daily as needed with no issues.  One office visit from 11/2014 show treatment with Norco 10/325 mg  with no issues    Interval History (7/22/2019):  She returns today for follow up.  She reports that the right GT bursa injection provided greater than 50% benefit at 1st, lasting until this week.  Norco has been helpful for the pain, but methocarbamol 2 tablets at nighttime has not been as helpful.  She has not been able to get the foam roller that we discussed at our last visit.  She wonders if a TENS unit can be helpful.  She saw Dr. Yuan, who ordered a bone scan to look at a possible fracture at L5 near the L5/S1 facet joint.  She has not had this done yet.    Of note, she continues to have difficulty sleeping.  She admits to using her tablet in the bed.  She reports that she was sleeping better before the amitriptyline and lorazepam were stopped; however, she does not wish to restart the that she has not had any falls since stopping these.    Physical Therapy:  Yes, just finished 2 weeks, she is doing the HEP (stretching) most days.      Non-pharmacologic Treatment:     · Ice/Heat: Heat is helpful (her dog lies on her back)  · TENS: Tried at the chiropractor  · Massage: Yes, in the past, helpful  · Chiropractic care:  Yes, in the past, helpful  · Acupuncture: She is interested in trying this.  · Other: Uses a body pillow for sleep         Pain Medications:         · Currently taking: Tylenol 0026-4293 BID PRN (takes it for before the Norco), Norco 7.5/325 mg BID PRN (she tries to limit this as much as possible), methocarbamol 750-1500 mg QHS, gabapentin 400 mg BID, trazodone 100 mg QHS, Wellbutrin, Lexapro    · Has tried in the past:    · Opioids: Oxycodone (doesn't like)  · NSAIDS: Celebrex (caused diarrhea)  · Tylenol: Tylenol 1183-5871   · Muscle relaxants: tizanidine (caused SE of not remembering and seeing things)  · TCAs: Amitriptyline (stopped due to falls)  · SNRIs: Not tried  · Anticonvulsants: On gabapentin  · topical creams: Lotion for nighttime, bath oil  · Other: Lorazepam (stopped this)    Blood  "thinners: Plavix for     Interventional Therapies:   · L4/5 Epidural steroid injection: For many years.  She is unsure of the last one  · Right SI joint: No benefit  · Right L5-S3 MBB: positive  · 06/10/2019:  Right L5-S3 RFA:  50% benefit  · 06/25/2019:  Right greater trochanteric bursa injection:  50% benefit    Relevant Surgeries:   · 2006: Lumbar surgery  · 2007: Lumbar surgery  · 2012: Lumbar surgery, PISF  · 2006: Cervical surgery    Affecting sleep? Yes, she cannot sleep due to "brain not turning off"    Affecting daily activities? Yes, she is unable to mop, cook, or do most activities    Depressive symptoms? Yes, she has been diagnosed with depression          · SI/HI? No    Work status: Criminal Justice, disabled due to pain    Prescription Monitoring Program database:  Reviewed and consistent with medication use as prescribed.    Last 3 PDI Scores 7/22/2019 6/25/2019 5/21/2019   Pain Disability Index (PDI) 45 22 41       Opioid Risk Score       Value Time User    Opioid Risk Score  4 5/21/2019  3:57 PM Clare Alfonso MA          GENERAL:  Positive for fatigue.  No weight loss, malaise or fevers.  HEENT:   No recent changes in vision or hearing  NECK:  Negative for lumps, no difficulty with swallowing.  RESPIRATORY:  Positive for cough.  Negative for wheezing or shortness of breath, patient denies any recent URI.  CARDIOVASCULAR:  Negative for chest pain, leg swelling or palpitations.  GI:  Positive for diarrhea.  Negative for abdominal discomfort, blood in stools or black stools or change in bowel habits.  MUSCULOSKELETAL:  See HPI.  SKIN:  Positive for color change, texture change.  Negative for lesions, rash, and itching.  PSYCH:  Positive for anxiety.  No other mood disorder or recent psychosocial stressors.    HEMATOLOGY/LYMPHOLOGY:  Positive for easy bruising.  Negative for prolonged bleeding or swollen nodes.    ENDO:  Positive for hypothyroidism and diabetes.    NEURO:   Positive for " headaches, head trauma (due to recent fall), loss of balance, difficulty sleeping.  No history of syncope, paralysis, seizures or tremors.  All other reviewed and negative other than HPI.          Past Medical History:   Diagnosis Date    Acute pancreatitis     CHF (congestive heart failure)     COPD (chronic obstructive pulmonary disease)     Depression     Diverticulosis     GERD (gastroesophageal reflux disease)     Hypertension     Thyroid disease        Past Surgical History:   Procedure Laterality Date    ADRENAL GLAND SURGERY      CHOLECYSTECTOMY      COLONOSCOPY      HERNIA REPAIR      HYSTERECTOMY      INCISIONAL HERNIA REPAIR      instestine      PARATHYROID GLAND SURGERY      3 surgeries    Radiofrequency Ablation - RIGHT L3-5 RADIOFREQUENCY ABLATION WITH [email or web address] COOLIEF THERMAL SYSTEM Right 6/10/2019    [facility]    RIGHT L5-S3 MEDIAL BRANCH BLOCKS Right 5/27/2019    [facility] OR    UPPER GASTROINTESTINAL ENDOSCOPY         Review of patient's allergies indicates:   Allergen Reactions    Aspirin     Lortab [hydrocodone-acetaminophen] Itching    Phenergan [promethazine]     Promethazine hcl        Current Outpatient Medications   Medication Sig Dispense Refill    acetaminophen (TYLENOL) 325 MG tablet Take 2 tablets (650 mg total) by mouth every 6 (six) hours as needed (or equal to 101 degree F).      allopurinol (ZYLOPRIM) 300 MG tablet       amlodipine-benazepril 5-10 mg (LOTREL) 5-10 mg per capsule       buPROPion (WELLBUTRIN XL) 150 MG TB24 tablet Take 1 tablet (150 mg total) by mouth once daily. 90 tablet 3    clopidogrel (PLAVIX) 75 mg tablet Take 1 tablet (75 mg total) by mouth once daily. 14 tablet 0    colestipol (COLESTID) 1 gram Tab       cyanocobalamin 1,000 mcg/mL injection Inject 1 mL (1,000 mcg total) into the muscle every 14 (fourteen) days. Take 1 injection every other week for 6 doses 6 mL 2     escitalopram (LEXAPRO) 20 MG tablet Take 20 mg by mouth once daily.      gabapentin (NEURONTIN) 400 MG capsule       glycopyrrolate (ROBINUL) 1 mg Tab TK 1 T PO BID 90 tablet 3    HYDROcodone-acetaminophen (NORCO) 7.5-325 mg per tablet Take 1 tablet by mouth 2 (two) times daily as needed for Pain. 60 tablet 0    levothyroxine (SYNTHROID) 50 MCG tablet Take 50 mcg by mouth once daily.      methocarbamol (ROBAXIN) 750 MG Tab       metoprolol tartrate (LOPRESSOR) 100 MG tablet Take 1 tablet (100 mg total) by mouth 2 (two) times daily. 90 tablet 3    ondansetron (ZOFRAN) 4 MG tablet Take 8 mg by mouth every 8 (eight) hours.      pantoprazole (PROTONIX) 40 MG tablet Take 40 mg by mouth once daily.      potassium chloride SA (K-DUR,KLOR-CON) 20 MEQ tablet Take 1 tablet (20 mEq total) by mouth once daily. 30 tablet 3    rosuvastatin (CRESTOR) 40 MG Tab Take 1 tablet (40 mg total) by mouth once daily. 90 tablet 3    traZODone (DESYREL) 100 MG tablet Take 1 tablet (100 mg total) by mouth every evening. 30 tablet 2     No current facility-administered medications for this visit.        Family History   Problem Relation Age of Onset    Stroke Maternal Grandmother     Cancer Maternal Grandfather     Stroke Mother     Heart disease Father        Social History     Socioeconomic History    Marital status: Single     Spouse name: Not on file    Number of children: 0    Years of education: Not on file    Highest education level: Not on file   Occupational History    Not on file   Social Needs    Financial resource strain: Not on file    Food insecurity:     Worry: Not on file     Inability: Not on file    Transportation needs:     Medical: Not on file     Non-medical: Not on file   Tobacco Use    Smoking status: Current Every Day Smoker     Packs/day: 3.00     Years: 15.00     Pack years: 45.00    Smokeless tobacco: Current User   Substance and Sexual Activity    Alcohol use: No    Drug use: No    Sexual  "activity: Not Currently   Lifestyle    Physical activity:     Days per week: Not on file     Minutes per session: Not on file    Stress: Not on file   Relationships    Social connections:     Talks on phone: Not on file     Gets together: Not on file     Attends Shinto service: Not on file     Active member of club or organization: Not on file     Attends meetings of clubs or organizations: Not on file     Relationship status: Not on file   Other Topics Concern    Not on file   Social History Narrative    Not on file       Objective:     Vitals:    07/22/19 0811   BP: (!) 119/59   Pulse: (!) 55   Resp: 16   Temp: 98.2 °F (36.8 °C)   TempSrc: Oral   Weight: 83.9 kg (185 lb)   Height: 5' 7" (1.702 m)   PainSc:   5       GEN:  Well developed, well nourished.  No acute distress. No pain behavior.  HEENT:  No trauma.  Mucous membranes moist.  Nares patent bilaterally.  PSYCH: Normal affect. Thought content appropriate.  CHEST:  Breathing symmetric.  No audible wheezing.  ABD: Soft, non-distended.  SKIN:  Warm, pink, dry.  No rash on exposed areas.    EXT:  No cyanosis, clubbing, or edema.  No color change or changes in nail or hair growth.  NEURO/MUSCULOSKELETAL:  Fully alert, oriented, and appropriate. Speech normal kevin. No cranial nerve deficits.   Gait: Antalgic.      Previous physical exam:  Present trendelenburg sign bilaterally.   Motor Strength: 5/5 motor strength throughout lower extremities.   Sensory: Decreased sensation in bilateral soles of her feet equally.  Increased sensation in a right L5.   Reflexes:  1+ and symmetric throughout.  Downgoing Babinski's bilaterally.  No clonus or spasticity.  L-Spine:  Decreased ROM with pain on flexion. Minimal pain with axial/facet loading bilaterally.  Positive SLR on the left for pain in an S1  SI Joint/Hip: Positive MIKEY on the right.  Negative MIKEY on the left and FADIR bilaterally.  TTP over lumbar paraspinals, right SI joint   No TTP over right lumbar " paraspinals, right SI joint.  TTP over right GTB      Imaging:      The imaging studies listed below were independently reviewed by me, and I agree with the findings as documented below.     MRIs have been done today, but the reports are not yet available.  Pre for review of the MRI of the lumbar spine shows posterior instrumented spinal fusion at L4/5 with some residual degenerative disc disease at that level with adjacent endplate edema at L4 and L5. Otherwise, the MRI looks largely unremarkable.      Assessment:     Encounter Diagnoses   Name Primary?    Chronic pain disorder Yes    DDD (degenerative disc disease), lumbar     Lumbosacral spondylolysis     History of lumbar spinal fusion     Sacroiliac joint pain     Greater trochanteric bursitis of right hip     Chronic anticoagulation     Opioid contract exists        Plan:     Alessia was seen today for follow-up.    Diagnoses and all orders for this visit:    Chronic pain disorder  -     HYDROcodone-acetaminophen (NORCO) 7.5-325 mg per tablet; Take 1 tablet by mouth 2 (two) times daily as needed for Pain.    DDD (degenerative disc disease), lumbar  -     HYDROcodone-acetaminophen (NORCO) 7.5-325 mg per tablet; Take 1 tablet by mouth 2 (two) times daily as needed for Pain.    Lumbosacral spondylolysis  -     HYDROcodone-acetaminophen (NORCO) 7.5-325 mg per tablet; Take 1 tablet by mouth 2 (two) times daily as needed for Pain.    History of lumbar spinal fusion  -     HYDROcodone-acetaminophen (NORCO) 7.5-325 mg per tablet; Take 1 tablet by mouth 2 (two) times daily as needed for Pain.    Sacroiliac joint pain  -     HYDROcodone-acetaminophen (NORCO) 7.5-325 mg per tablet; Take 1 tablet by mouth 2 (two) times daily as needed for Pain.    Greater trochanteric bursitis of right hip    Chronic anticoagulation    Opioid contract exists         She presents today with a longstanding chronic pain history with several recent medical issues that are complicating  the picture.  We will work to control her pain with as few sedating medications as possible.  It is reasonable to think that these may have had something to do with her falls given her symptoms of amnesia surrounding the event, lightheadedness, and confusion.      We discussed the assessment and recommendations.  All available images were reviewed. We discussed the disease process, prognosis, treatment plan, and risks and benefits. The patient is aware of the risks and benefits of the medications being prescribed, common side effects, and proper usage. The following is the plan we agreed on:     1. Schedule the bone scan originally ordered by Dr. Yuan  2. Can repeat right L5-S3 RFA PRN  3. Can repeat Right GTB injection PRN  4. Continue the Norco 7.5/325 mg twice daily as needed.  Currently, the benefits outweigh the risks.  We had a long discussion regarding the risks and benefits of narcotic therapy.  We will work to minimize these as much as possible.  [facility] reviewed and is appropriate.  Opioid contract signed today.  UDS done 06/25/2019:  Consistent  5. We had an extensive discussion regarding the expected time course for PT for chronic pain vs after a surgery.  Specifically, we discussed that PT for chronic pain almost always causes a worsening of pain before any improvement, which is generally not seen for 3-6 months, in which time she will likely already be home doing home exercises given that a formal course of PT generally lasts anywhere from 6-12 weeks.  6. Recommend she obtain a foam roller for home use for her bursitis  7. Recommend that she obtain a TENS unit for home use  8. She would be a good candidate for FRP in the future  9. RTC in 4 weeks or sooner if needed.      Sneha Aragon MD  07/22/2019     The above plan and management options were discussed at length with patient. Patient is in agreement with the above and verbalized understanding. It will be communicated with the referring  physician via electronic record, fax, or mail.

## 2019-07-22 NOTE — LETTER
July 22, 2019      Cheryl Bradley, NP  [address]  [place] [address]           [facility]  [address] [place] [address]  Phone: [phone]  Fax: [phone]          Patient: Alessia Nelson   MR Number: [id number]   YOB: 1955   Date of Visit: 7/22/2019       Dear Cheryl Bradley:    Thank you for referring Alessia Nelson to me for evaluation. Attached you will find relevant portions of my assessment and plan of care.    If you have questions, please do not hesitate to call me. I look forward to following Alessia Nelson along with you.    Sincerely,    Sneha Aragon MD    Enclosure  CC:  No Recipients    If you would like to receive this communication electronically, please contact [email or web address] or [phone] to request more information on [employer] Link access.    For providers and/or their staff who would like to refer a patient to [facility], please contact us through our one-stop-shop provider referral line, [facility], at [phone].    If you feel you have received this communication in error or would no longer like to receive these types of communications, please e-mail [email or web address]

## 2019-07-22 NOTE — PATIENT INSTRUCTIONS
"Recommend taking Melatonin 5-10 mg at bedtime as needed for sleep.    Recommend Miralax to use for hard stools/constipation.    I recommend slowly increasing your water intake to 90 ounces daily.  This will increase your urination initially, but, as your body gets used to seeing it, it will improve.    To get and use a foam roller for your IT band:      Type "foam roller for it band" into [employer]ios. A video named "How to Use a Foam Roller to Release Your IT Band" (featuring a woman in a red shirt) will come up on how to use a foam roller to stretch your IT band. This will help with your bursitis. Also, links will appear where you can order a foam roller to use. Recommend purchasing one around $20 that is smooth (no lumps or grooves). I recommend the [employer] Foam Roller, 18", which is available from multiple retailers for around $20. I would avoid getting one that is "high density"                I am recommending that you order a TENS unit online.  This ends up being cheaper and faster than going through insurance.    Go to this website: www.tenspros.com    The first listing is for the TENS 7000, which is $26.  This is the unit I recommend.    The next listing is for the extra electrodes, although these are also available when you click on the link for the TENS 7000. (See below)      You can then follow the website instructions to complete your order. The total cost ends up being ~$40 including shipping.      "

## 2019-07-24 ENCOUNTER — LAB VISIT (OUTPATIENT)
Dept: LAB | Facility: HOSPITAL | Age: 64
End: 2019-07-24
Attending: INTERNAL MEDICINE
Payer: MEDICARE

## 2019-07-24 ENCOUNTER — OFFICE VISIT (OUTPATIENT)
Dept: GASTROENTEROLOGY | Facility: CLINIC | Age: 64
End: 2019-07-24
Payer: MEDICARE

## 2019-07-24 VITALS
SYSTOLIC BLOOD PRESSURE: 140 MMHG | HEIGHT: 67 IN | HEART RATE: 60 BPM | BODY MASS INDEX: 29.19 KG/M2 | DIASTOLIC BLOOD PRESSURE: 78 MMHG | WEIGHT: 186 LBS | OXYGEN SATURATION: 96 % | TEMPERATURE: 98 F

## 2019-07-24 DIAGNOSIS — K57.30 DIVERTICULOSIS OF LARGE INTESTINE WITHOUT HEMORRHAGE: ICD-10-CM

## 2019-07-24 DIAGNOSIS — I25.10 ATHEROSCLEROSIS OF NATIVE CORONARY ARTERY OF NATIVE HEART WITHOUT ANGINA PECTORIS: ICD-10-CM

## 2019-07-24 DIAGNOSIS — Z95.5 S/P DRUG ELUTING CORONARY STENT PLACEMENT: ICD-10-CM

## 2019-07-24 DIAGNOSIS — Z87.19 HISTORY OF PANCREATITIS: ICD-10-CM

## 2019-07-24 DIAGNOSIS — K59.00 CONSTIPATION, UNSPECIFIED CONSTIPATION TYPE: Primary | ICD-10-CM

## 2019-07-24 DIAGNOSIS — R93.2 ABNORMAL COMPUTERIZED TOMOGRAPHY OF BILIARY TRACT: ICD-10-CM

## 2019-07-24 DIAGNOSIS — E78.5 DYSLIPIDEMIA: ICD-10-CM

## 2019-07-24 DIAGNOSIS — G89.29 CHRONIC BILATERAL LOW BACK PAIN WITHOUT SCIATICA: ICD-10-CM

## 2019-07-24 DIAGNOSIS — M54.50 CHRONIC BILATERAL LOW BACK PAIN WITHOUT SCIATICA: ICD-10-CM

## 2019-07-24 DIAGNOSIS — Z90.49 HISTORY OF CHOLECYSTECTOMY: ICD-10-CM

## 2019-07-24 LAB
CHOLEST SERPL-MCNC: 89 MG/DL (ref 120–199)
CHOLEST/HDLC SERPL: 2.2 {RATIO} (ref 2–5)
CRP SERPL-MCNC: 0.82 MG/L (ref 0–3.19)
HDLC SERPL-MCNC: 40 MG/DL (ref 40–75)
HDLC SERPL: 44.9 % (ref 20–50)
LDLC SERPL CALC-MCNC: 8 MG/DL (ref 63–159)
NONHDLC SERPL-MCNC: 49 MG/DL
TRIGL SERPL-MCNC: 205 MG/DL (ref 30–150)

## 2019-07-24 PROCEDURE — 80061 LIPID PANEL: CPT

## 2019-07-24 PROCEDURE — 99213 OFFICE O/P EST LOW 20 MIN: CPT | Mod: S$GLB,,, | Performed by: INTERNAL MEDICINE

## 2019-07-24 PROCEDURE — 86141 C-REACTIVE PROTEIN HS: CPT

## 2019-07-24 PROCEDURE — 36415 COLL VENOUS BLD VENIPUNCTURE: CPT

## 2019-07-24 PROCEDURE — 3077F PR MOST RECENT SYSTOLIC BLOOD PRESSURE >= 140 MM HG: ICD-10-PCS | Mod: CPTII,S$GLB,, | Performed by: INTERNAL MEDICINE

## 2019-07-24 PROCEDURE — 99213 PR OFFICE/OUTPT VISIT, EST, LEVL III, 20-29 MIN: ICD-10-PCS | Mod: S$GLB,,, | Performed by: INTERNAL MEDICINE

## 2019-07-24 PROCEDURE — 3008F PR BODY MASS INDEX (BMI) DOCUMENTED: ICD-10-PCS | Mod: CPTII,S$GLB,, | Performed by: INTERNAL MEDICINE

## 2019-07-24 PROCEDURE — 99999 PR PBB SHADOW E&M-EST. PATIENT-LVL IV: ICD-10-PCS | Mod: PBBFAC,,, | Performed by: INTERNAL MEDICINE

## 2019-07-24 PROCEDURE — 3078F DIAST BP <80 MM HG: CPT | Mod: CPTII,S$GLB,, | Performed by: INTERNAL MEDICINE

## 2019-07-24 PROCEDURE — 3077F SYST BP >= 140 MM HG: CPT | Mod: CPTII,S$GLB,, | Performed by: INTERNAL MEDICINE

## 2019-07-24 PROCEDURE — 3008F BODY MASS INDEX DOCD: CPT | Mod: CPTII,S$GLB,, | Performed by: INTERNAL MEDICINE

## 2019-07-24 PROCEDURE — 3078F PR MOST RECENT DIASTOLIC BLOOD PRESSURE < 80 MM HG: ICD-10-PCS | Mod: CPTII,S$GLB,, | Performed by: INTERNAL MEDICINE

## 2019-07-24 PROCEDURE — 99999 PR PBB SHADOW E&M-EST. PATIENT-LVL IV: CPT | Mod: PBBFAC,,, | Performed by: INTERNAL MEDICINE

## 2019-07-24 RX ORDER — LUBIPROSTONE 24 UG/1
24 CAPSULE ORAL 2 TIMES DAILY
Qty: 60 CAPSULE | Refills: 0 | Status: SHIPPED | OUTPATIENT
Start: 2019-07-24 | End: 2019-08-23

## 2019-07-24 NOTE — PROGRESS NOTES
Subjective:       Patient ID: Alessia Nelson is a 63 y.o. female.    Chief Complaint: Constipation    She complains of constipation.  Over the years she has had constipation because of her medications.  She has had increasing back pain since her fall in January.  She has been followed by the nurse surgeon in the Pain Clinic and appears that she does have a fracture of the S1.  A nuclear medicine scan is scheduled.  She had an episode of pancreatitis and she has had a fundoplication and also cholecystectomy.  The MRCP shows narrowing.  I am trying to get an ERCP scheduled.  She denies dysphagia.  She has occasional nausea but she does not vomit.  She denies jaundice abdominal pain or GI bleeding.  She complains of abdominal distension because of the constipation.  She has had a thyroidectomy for cancer.  She is on multiple medications.  Her hyperlipidemia and hypertension been well controlled.  Unfortunately she has continued to smoke cigarettes in spite of been offered the smoking cessation program.  She does not use alcohol.      Allergies:  Review of patient's allergies indicates:   Allergen Reactions    Aspirin     Lortab [hydrocodone-acetaminophen] Itching    Phenergan [promethazine]     Promethazine hcl        Medications:    Current Outpatient Medications:     acetaminophen (TYLENOL) 325 MG tablet, Take 2 tablets (650 mg total) by mouth every 6 (six) hours as needed (or equal to 101 degree F)., Disp: , Rfl:     allopurinol (ZYLOPRIM) 300 MG tablet, , Disp: , Rfl:     amlodipine-benazepril 5-10 mg (LOTREL) 5-10 mg per capsule, , Disp: , Rfl:     buPROPion (WELLBUTRIN XL) 150 MG TB24 tablet, Take 1 tablet (150 mg total) by mouth once daily., Disp: 90 tablet, Rfl: 3    clopidogrel (PLAVIX) 75 mg tablet, Take 1 tablet (75 mg total) by mouth once daily., Disp: 14 tablet, Rfl: 0    colestipol (COLESTID) 1 gram Tab, , Disp: , Rfl:     cyanocobalamin 1,000 mcg/mL injection, Inject 1 mL (1,000 mcg total)  into the muscle every 14 (fourteen) days. Take 1 injection every other week for 6 doses, Disp: 6 mL, Rfl: 2    escitalopram (LEXAPRO) 20 MG tablet, Take 20 mg by mouth once daily., Disp: , Rfl:     gabapentin (NEURONTIN) 400 MG capsule, , Disp: , Rfl:     glycopyrrolate (ROBINUL) 1 mg Tab, TK 1 T PO BID, Disp: 90 tablet, Rfl: 3    HYDROcodone-acetaminophen (NORCO) 7.5-325 mg per tablet, Take 1 tablet by mouth 2 (two) times daily as needed for Pain., Disp: 60 tablet, Rfl: 0    levothyroxine (SYNTHROID) 50 MCG tablet, Take 50 mcg by mouth once daily., Disp: , Rfl:     metoprolol tartrate (LOPRESSOR) 100 MG tablet, Take 1 tablet (100 mg total) by mouth 2 (two) times daily., Disp: 90 tablet, Rfl: 3    ondansetron (ZOFRAN) 4 MG tablet, Take 8 mg by mouth every 8 (eight) hours., Disp: , Rfl:     pantoprazole (PROTONIX) 40 MG tablet, Take 40 mg by mouth once daily., Disp: , Rfl:     potassium chloride SA (K-DUR,KLOR-CON) 20 MEQ tablet, Take 1 tablet (20 mEq total) by mouth once daily., Disp: 30 tablet, Rfl: 3    rosuvastatin (CRESTOR) 40 MG Tab, Take 1 tablet (40 mg total) by mouth once daily., Disp: 90 tablet, Rfl: 3    traZODone (DESYREL) 100 MG tablet, Take 1 tablet (100 mg total) by mouth every evening., Disp: 30 tablet, Rfl: 2    lubiprostone (AMITIZA) 24 MCG Cap, Take 1 capsule (24 mcg total) by mouth 2 (two) times daily., Disp: 60 capsule, Rfl: 0    Past Medical History:   Diagnosis Date    Acute pancreatitis     CHF (congestive heart failure)     COPD (chronic obstructive pulmonary disease)     Depression     Diverticulosis     GERD (gastroesophageal reflux disease)     Hypertension     Thyroid disease        Past Surgical History:   Procedure Laterality Date    ADRENAL GLAND SURGERY      CHOLECYSTECTOMY      COLONOSCOPY      HERNIA REPAIR      HYSTERECTOMY      INCISIONAL HERNIA REPAIR      instestine      PARATHYROID GLAND SURGERY      3 surgeries    Radiofrequency Ablation - RIGHT  L3-5 RADIOFREQUENCY ABLATION WITH HALYARD COOLIEF THERMAL SYSTEM Right 6/10/2019    Performed by S[facility]ha Aragon MD at [facility] OR    RIGHT L5-S3 MEDIAL BRANCH BLOCKS Right 5/27/2019    Performed by [facility]ha [facility]agon MD at [facility] OR    UPPER GASTROINTESTINAL ENDOSCOPY           Review of Systems   Constitutional: Positive for activity change. Negative for appetite change, fever and unexpected weight change.        Limited activity due to her chronic pain syndrome arthritis and chronic back pain. She is able to perform her usual activities.   HENT: Negative for trouble swallowing.         No jaundice.   Respiratory: Negative for cough, shortness of breath and wheezing.         She is a cigarette smoker.  She has occasional coughing.  She denies dyspnea with her usual activities.   Cardiovascular: Negative for chest pain.        Her hyperlipidemia and hypertension are well controlled   Gastrointestinal: Positive for abdominal distention, constipation and nausea. Negative for abdominal pain, anal bleeding, blood in stool and diarrhea.        Occasional nausea. She denies a precipitating factor.  She is now constipated.  She denies bleeding jaundice or aspiration.   Musculoskeletal: Positive for arthralgias, back pain, neck pain and neck stiffness.   Skin: Negative for pallor and rash.   Neurological: Negative for dizziness, seizures, syncope, speech difficulty, weakness and numbness.   Hematological: Negative for adenopathy.   Psychiatric/Behavioral: Negative for confusion.       Objective:      Physical Exam   Constitutional: She is oriented to person, place, and time. She appears well-nourished.   Well-nourished well-hydrated overweight nonicteric white female   HENT:   Head: Normocephalic.   Eyes: Pupils are equal, round, and reactive to light. EOM are normal.   Neck: Normal range of motion. Neck supple. No tracheal deviation present. No thyromegaly present.   Cardiovascular: Normal rate, regular rhythm and normal heart  sounds.   Pulmonary/Chest: Effort normal and breath sounds normal.   Abdominal: Soft. Bowel sounds are normal. She exhibits distension. She exhibits no mass. There is no tenderness. There is no rebound and no guarding. No hernia.   Abdomen is soft obese and mildly distended.  Definite masses organomegaly not detected.  Bowel sounds are normal. Tenderness is absent.   Musculoskeletal: Normal range of motion.   She is able to ambulate without difficulty.  She get from the sitting to the standing position without difficulty. She needs only minimal assistance to get on the exam table.   Lymphadenopathy:     She has no cervical adenopathy.   Neurological: She is alert and oriented to person, place, and time. No cranial nerve deficit.   Skin: Skin is warm and dry.   Psychiatric: She has a normal mood and affect. Her behavior is normal.   Vitals reviewed.        Plan:       Constipation, unspecified constipation type  -     lubiprostone (AMITIZA) 24 MCG Cap; Take 1 capsule (24 mcg total) by mouth 2 (two) times daily.  Dispense: 60 capsule; Refill: 0    Diverticulosis of large intestine without hemorrhage    Chronic bilateral low back pain without sciatica    History of cholecystectomy    History of pancreatitis  -     Ambulatory Referral to Gastroenterology    Abnormal computerized tomography of biliary tract     She will continue her reflux regimen current medications vitamins minerals.  We will plan to start on the Amitiza he.  She will follow up in the Pain Clinic.  I will schedule an ERCP as soon as possible.

## 2019-07-24 NOTE — PATIENT INSTRUCTIONS
She will continue with the Pain Clinic for the back pain.  I am trying to get her an appointment for an ERCP because she has had pancreatitis and has narrowing of her biliary system.  She denies jaundice.  She has noted constipation.  She had her last colonoscopy about a year so ago.  This is probably related to her medicines and will try the Amitiza he a initially.  She will continue her reflux regimen current medications vitamins and minerals.

## 2019-07-24 NOTE — LETTER
July 24, 2019      Cheryl Bradley, ANUM  [address]  [place] [address]           [facility]  [address], [address]  [place] [address]  Phone: [phone]  Fax: [phone]          Patient: Alessia Nelson   MR Number: [id number]   YOB: 1955   Date of Visit: 7/24/2019       Dear Cheryl rBadley:    Thank you for referring Alessia Nelson to me for evaluation. Attached you will find relevant portions of my assessment and plan of care.    If you have questions, please do not hesitate to call me. I look forward to following Alessia Nelson along with you.    Sincerely,    Manpreet Fraga MD    Enclosure  CC:  No Recipients    If you would like to receive this communication electronically, please contact [email or web address] or [phone] to request more information on [employer] Link access.    For providers and/or their staff who would like to refer a patient to [facility], please contact us through our one-stop-shop provider referral line, [facility], at [phone].    If you feel you have received this communication in error or would no longer like to receive these types of communications, please e-mail [email or web address]

## 2019-08-01 ENCOUNTER — HOSPITAL ENCOUNTER (OUTPATIENT)
Dept: RADIOLOGY | Facility: HOSPITAL | Age: 64
Discharge: HOME | End: 2019-08-01
Attending: NURSE PRACTITIONER
Payer: MEDICARE

## 2019-08-01 ENCOUNTER — OFFICE VISIT (OUTPATIENT)
Dept: FAMILY MEDICINE | Facility: CLINIC | Age: 64
End: 2019-08-01
Payer: MEDICARE

## 2019-08-01 VITALS
HEIGHT: 67 IN | WEIGHT: 185 LBS | BODY MASS INDEX: 29.03 KG/M2 | DIASTOLIC BLOOD PRESSURE: 83 MMHG | RESPIRATION RATE: 19 BRPM | TEMPERATURE: 98 F | OXYGEN SATURATION: 96 % | HEART RATE: 65 BPM | SYSTOLIC BLOOD PRESSURE: 137 MMHG

## 2019-08-01 DIAGNOSIS — F51.01 PRIMARY INSOMNIA: ICD-10-CM

## 2019-08-01 DIAGNOSIS — M79.89 RIGHT LEG SWELLING: ICD-10-CM

## 2019-08-01 DIAGNOSIS — M79.604 RIGHT LEG PAIN: ICD-10-CM

## 2019-08-01 DIAGNOSIS — M79.604 RIGHT LEG PAIN: Primary | ICD-10-CM

## 2019-08-01 PROCEDURE — 99999 PR PBB SHADOW E&M-EST. PATIENT-LVL III: ICD-10-PCS | Mod: PBBFAC,,, | Performed by: NURSE PRACTITIONER

## 2019-08-01 PROCEDURE — 99213 PR OFFICE/OUTPT VISIT, EST, LEVL III, 20-29 MIN: ICD-10-PCS | Mod: S$GLB,,, | Performed by: NURSE PRACTITIONER

## 2019-08-01 PROCEDURE — 93970 EXTREMITY STUDY: CPT | Mod: 26,,, | Performed by: RADIOLOGY

## 2019-08-01 PROCEDURE — 3008F PR BODY MASS INDEX (BMI) DOCUMENTED: ICD-10-PCS | Mod: CPTII,S$GLB,, | Performed by: NURSE PRACTITIONER

## 2019-08-01 PROCEDURE — 3075F SYST BP GE 130 - 139MM HG: CPT | Mod: CPTII,S$GLB,, | Performed by: NURSE PRACTITIONER

## 2019-08-01 PROCEDURE — 93970 US LOWER EXTREMITY VEINS BILATERAL: ICD-10-PCS | Mod: 26,,, | Performed by: RADIOLOGY

## 2019-08-01 PROCEDURE — 3075F PR MOST RECENT SYSTOLIC BLOOD PRESS GE 130-139MM HG: ICD-10-PCS | Mod: CPTII,S$GLB,, | Performed by: NURSE PRACTITIONER

## 2019-08-01 PROCEDURE — 99213 OFFICE O/P EST LOW 20 MIN: CPT | Mod: S$GLB,,, | Performed by: NURSE PRACTITIONER

## 2019-08-01 PROCEDURE — 3008F BODY MASS INDEX DOCD: CPT | Mod: CPTII,S$GLB,, | Performed by: NURSE PRACTITIONER

## 2019-08-01 PROCEDURE — 3079F PR MOST RECENT DIASTOLIC BLOOD PRESSURE 80-89 MM HG: ICD-10-PCS | Mod: CPTII,S$GLB,, | Performed by: NURSE PRACTITIONER

## 2019-08-01 PROCEDURE — 93970 EXTREMITY STUDY: CPT | Mod: TC,PN

## 2019-08-01 PROCEDURE — 3079F DIAST BP 80-89 MM HG: CPT | Mod: CPTII,S$GLB,, | Performed by: NURSE PRACTITIONER

## 2019-08-01 PROCEDURE — 99999 PR PBB SHADOW E&M-EST. PATIENT-LVL III: CPT | Mod: PBBFAC,,, | Performed by: NURSE PRACTITIONER

## 2019-08-01 RX ORDER — TRAZODONE HYDROCHLORIDE 100 MG/1
100 TABLET ORAL NIGHTLY
Qty: 30 TABLET | Refills: 3 | Status: SHIPPED | OUTPATIENT
Start: 2019-08-01 | End: 2019-10-25 | Stop reason: SDUPTHER

## 2019-08-01 RX ORDER — TRAZODONE HYDROCHLORIDE 100 MG/1
100 TABLET ORAL NIGHTLY
Qty: 30 TABLET | Refills: 2 | Status: CANCELLED | OUTPATIENT
Start: 2019-08-01 | End: 2020-07-31

## 2019-08-01 RX ORDER — TRAZODONE HYDROCHLORIDE 100 MG/1
100 TABLET ORAL NIGHTLY
Qty: 14 TABLET | Refills: 0 | Status: SHIPPED | OUTPATIENT
Start: 2019-08-01 | End: 2019-08-01 | Stop reason: SDUPTHER

## 2019-08-01 NOTE — PROGRESS NOTES
Please let Ms. Aggarwal know she does not have a blood clot. She has a cyst behind her knee which is most likely causing her the pain.   Follow up in clinic if no improvement  - Encourage her to watch salt intake, elevate legs, rest if needed, and wear compression stockings

## 2019-08-01 NOTE — PROGRESS NOTES
Subjective:       Patient ID: Alessia Nelson is a 63 y.o. female.    Chief Complaint: Leg Swelling (right leg)    Alessia Nelson is a 63 year old female who presents to the clinic today for acute right leg pain swelling. She reports she noticed the pain last week when she got to [place] after traveling in the car. She reports the pain has not improved. She also reports right lower leg swelling. She does smoke but has decreased to 0.5 PPD and denies hx of blood clots in the past. She denies chest pain or shortness of breath with rest or activity. She reports pain is now behind her knee as well.     Health Maintenance:  - Refused tdap today  - Shingles: not up to date  Leg Pain    The incident occurred 5 to 7 days ago. There was no injury mechanism. The pain is present in the right knee and right foot. The quality of the pain is described as aching. The pain is mild. The pain has been constant since onset. Pertinent negatives include no inability to bear weight, loss of motion, loss of sensation, muscle weakness or tingling. She reports no foreign bodies present. The symptoms are aggravated by movement. She has tried elevation and rest for the symptoms. The treatment provided no relief.     Review of Systems   Constitutional: Negative for activity change, chills, fatigue, fever and unexpected weight change.   HENT: Negative for congestion, ear pain, postnasal drip, sinus pressure, sneezing, sore throat and tinnitus.    Eyes: Negative for pain, redness and visual disturbance.   Respiratory: Negative for apnea, cough, chest tightness, shortness of breath and wheezing.    Cardiovascular: Positive for leg swelling. Negative for chest pain and palpitations.   Gastrointestinal: Negative for abdominal distention, abdominal pain, blood in stool, constipation, diarrhea, nausea (intermittent) and vomiting.   Endocrine: Negative for polydipsia, polyphagia and polyuria.   Genitourinary: Negative for difficulty urinating,  "dysuria, flank pain, frequency, hematuria and urgency.   Musculoskeletal: Positive for arthralgias, back pain and myalgias. Negative for joint swelling.        Sees pain management   Skin: Negative for color change, pallor and rash.   Allergic/Immunologic: Negative for environmental allergies, food allergies and immunocompromised state.   Neurological: Negative for dizziness, tingling, tremors, syncope, weakness, light-headedness and headaches.   Hematological: Negative for adenopathy. Does not bruise/bleed easily.   Psychiatric/Behavioral: Negative for agitation, confusion, decreased concentration, self-injury, sleep disturbance and suicidal ideas. The patient is not nervous/anxious and is not hyperactive.          Reviewed family, medical, surgical, and social history.    Objective:      /83 (BP Location: Left arm, Patient Position: Sitting, BP Method: Medium (Automatic))   Pulse 65   Temp 97.9 °F (36.6 °C) (Tympanic)   Resp 19   Ht 5' 7" (1.702 m)   Wt 83.9 kg (185 lb)   SpO2 96%   BMI 28.98 kg/m²   Physical Exam   Constitutional: She is oriented to person, place, and time. She appears well-developed and well-nourished. She is active and cooperative. No distress.   HENT:   Head: Normocephalic and atraumatic.       Right Ear: Hearing, tympanic membrane, external ear and ear canal normal. No drainage. No decreased hearing is noted.   Left Ear: Hearing, tympanic membrane, external ear and ear canal normal. No drainage. No decreased hearing is noted.   Nose: Nose normal. No mucosal edema, rhinorrhea or nasal deformity. No epistaxis.   Mouth/Throat: Uvula is midline, oropharynx is clear and moist and mucous membranes are normal. No uvula swelling. No oropharyngeal exudate or posterior oropharyngeal erythema. No tonsillar exudate.   Eyes: Pupils are equal, round, and reactive to light. Conjunctivae, EOM and lids are normal. Right eye exhibits no discharge. Left eye exhibits no discharge.   Neck: Trachea " normal and full passive range of motion without pain. No JVD present. No tracheal tenderness and no muscular tenderness present. Carotid bruit is not present. No edema and no erythema present. No thyroid mass and no thyromegaly present.   Cardiovascular: Normal rate, regular rhythm, S1 normal, S2 normal, normal heart sounds, intact distal pulses and normal pulses.   No murmur heard.  Pulmonary/Chest: Effort normal and breath sounds normal. No stridor. No tachypnea and no bradypnea. No respiratory distress. She has no decreased breath sounds. She has no wheezes. She has no rhonchi. She has no rales.   Abdominal: Soft. Normal appearance and bowel sounds are normal. She exhibits no distension and no abdominal bruit. There is no tenderness. There is no guarding and no CVA tenderness.   Musculoskeletal: She exhibits no edema, tenderness or deformity.        Right ankle: She exhibits swelling. She exhibits normal range of motion.        Right foot: There is normal range of motion.        Left foot: There is normal range of motion.   Lymphadenopathy:     She has no cervical adenopathy.   Neurological: She is alert and oriented to person, place, and time. She has normal strength. She exhibits normal muscle tone.   Skin: Skin is warm, dry and intact. Capillary refill takes less than 2 seconds. No abrasion, no laceration, no lesion and no rash noted. She is not diaphoretic. No cyanosis. Nails show no clubbing.   Psychiatric: She has a normal mood and affect. Her speech is normal and behavior is normal. Judgment and thought content normal. Cognition and memory are normal.       Assessment:       1. Right leg pain    2. Primary insomnia    3. Right leg swelling        Plan:       Right leg pain  -     Cancel: US Lower Extremity Veins Bilateral; Future; Expected date: 08/01/2019  -     US Lower Extremity Veins Bilateral; Future; Expected date: 08/01/2019    Primary insomnia  -     Discontinue: traZODone (DESYREL) 100 MG tablet;  Take 1 tablet (100 mg total) by mouth every evening.  Dispense: 14 tablet; Refill: 0  -     traZODone (DESYREL) 100 MG tablet; Take 1 tablet (100 mg total) by mouth every evening.  Dispense: 30 tablet; Refill: 3    Right leg swelling  -     Cancel: US Lower Extremity Veins Bilateral; Future; Expected date: 08/01/2019  -     US Lower Extremity Veins Bilateral; Future; Expected date: 08/01/2019          PLAN:  - Discussed with patient the plan of care  - STAT ultrasound   - Rest, elevate  - Encouraged smoking cessation   - Medications reviewed. Medication side effects discussed. Patient has no questions or concerns at this time. Informed patient to notify me regarding any concerns.   - Continue monitoring symptoms, if worsen or CP or SOB go to ER  - Informed patient to please notify me with any questions or concerns at anytime  - Follow up ordered for 1 week        Risks, benefits, and side effects were discussed with the patient. All questions were answered to the fullest satisfaction of the patient, and pt verbalized understanding and agreement to treatment plan. Pt was to call with any new or worsening symptoms, or present to the ER.

## 2019-08-02 ENCOUNTER — HOSPITAL ENCOUNTER (OUTPATIENT)
Dept: RADIOLOGY | Facility: HOSPITAL | Age: 64
Discharge: HOME | End: 2019-08-02
Attending: NEUROLOGICAL SURGERY
Payer: MEDICARE

## 2019-08-02 ENCOUNTER — TELEPHONE (OUTPATIENT)
Dept: PAIN MEDICINE | Facility: CLINIC | Age: 64
End: 2019-08-02

## 2019-08-02 DIAGNOSIS — M47.819 FACET JOINT DISEASE: ICD-10-CM

## 2019-08-02 DIAGNOSIS — G89.4 CHRONIC PAIN DISORDER: Primary | ICD-10-CM

## 2019-08-02 PROCEDURE — 78320 NM BONE SCAN SPECT: ICD-10-PCS | Mod: 26,,, | Performed by: RADIOLOGY

## 2019-08-02 PROCEDURE — 78320 NM BONE SCAN SPECT: CPT | Mod: 26,,, | Performed by: RADIOLOGY

## 2019-08-02 PROCEDURE — A9503 TC99M MEDRONATE: HCPCS

## 2019-08-02 NOTE — TELEPHONE ENCOUNTER
Spoke with patient, states she was receiving rx for gabapentin from previous pain management provider and this was discussed with you in previous appointment. She only has 5 days worth left. Prescription is for gabapentin (NEURONTIN) 400 MG capsule - take 1 capsule QID for neuropathy. Next OV is 8/20/19. Please advise. Thank you!!      ----- Message from Osvaldo Jeffrey sent at 8/2/2019 11:09 AM CDT -----  Contact: patient  Patient is requesting a refill of her gabapenten.  Please send to the [facility] pharmacy.  Please call to confirm [phone] (she only has 5 days worth)

## 2019-08-05 ENCOUNTER — TELEPHONE (OUTPATIENT)
Dept: PAIN MEDICINE | Facility: CLINIC | Age: 64
End: 2019-08-05

## 2019-08-05 ENCOUNTER — TELEPHONE (OUTPATIENT)
Dept: FAMILY MEDICINE | Facility: CLINIC | Age: 64
End: 2019-08-05

## 2019-08-05 NOTE — TELEPHONE ENCOUNTER
Please see below for patient request on routing to pharmacy. Gabapentin 400mg - take 1 capsule QID for neuropathy. Next OV is 8/20/19. Please advise. Thank you!        ----- Message from Manpreet Rodrigo sent at 8/5/2019  3:26 PM CDT -----  Type: Needs Medical Advice    Who Called: Patient     Pharmacy name and phone #:      [id number] - [place], [place] - [address] [place] [address] AT [address]  [address] [address]  Phone: [phone] Fax: [phone]        Best Call Back Number: [phone]    Additional Information: Patient states that her prescription for gabapentin (NEURONTIN) 400 MG capsule needs prior authorization for [employer].  Patient states that she would like a 10 day supply through [facility] and a 90 days through [email or web address].  [email or web address] is not on the patient's file.  Please call to advise

## 2019-08-05 NOTE — TELEPHONE ENCOUNTER
Appt scheduled for tomorrow morning with Siobhan in [place]  No other concerns at this time.         ----- Message from Manpreet Wallace sent at 8/5/2019  3:24 PM CDT -----  Type:  Sooner Apoointment Request    Caller is requesting a sooner appointment.  Caller declined first available appointment listed below.  Caller will not accept being placed on the waitlist and is requesting a message be sent to doctor.    Name of Caller:  Patient  When is the first available appointment?  08/22 [place]  Symptoms:  Follow Up-leg pain  Best Call Back Number:  [phone]  Additional Information:

## 2019-08-06 ENCOUNTER — TELEPHONE (OUTPATIENT)
Dept: ORTHOPEDICS | Facility: CLINIC | Age: 64
End: 2019-08-06

## 2019-08-06 ENCOUNTER — OFFICE VISIT (OUTPATIENT)
Dept: INTERNAL MEDICINE | Facility: CLINIC | Age: 64
End: 2019-08-06
Payer: MEDICARE

## 2019-08-06 VITALS
HEIGHT: 67 IN | TEMPERATURE: 97 F | DIASTOLIC BLOOD PRESSURE: 68 MMHG | WEIGHT: 192 LBS | RESPIRATION RATE: 19 BRPM | OXYGEN SATURATION: 95 % | SYSTOLIC BLOOD PRESSURE: 102 MMHG | BODY MASS INDEX: 30.13 KG/M2 | HEART RATE: 64 BPM

## 2019-08-06 DIAGNOSIS — M71.21 POPLITEAL CYST, RIGHT: Primary | ICD-10-CM

## 2019-08-06 DIAGNOSIS — M25.561 ACUTE PAIN OF RIGHT KNEE: ICD-10-CM

## 2019-08-06 PROCEDURE — 99212 OFFICE O/P EST SF 10 MIN: CPT | Mod: S$GLB,,, | Performed by: NURSE PRACTITIONER

## 2019-08-06 PROCEDURE — 3008F BODY MASS INDEX DOCD: CPT | Mod: CPTII,S$GLB,, | Performed by: NURSE PRACTITIONER

## 2019-08-06 PROCEDURE — 99999 PR PBB SHADOW E&M-EST. PATIENT-LVL III: ICD-10-PCS | Mod: PBBFAC,,, | Performed by: NURSE PRACTITIONER

## 2019-08-06 PROCEDURE — 3078F DIAST BP <80 MM HG: CPT | Mod: CPTII,S$GLB,, | Performed by: NURSE PRACTITIONER

## 2019-08-06 PROCEDURE — 99999 PR PBB SHADOW E&M-EST. PATIENT-LVL III: CPT | Mod: PBBFAC,,, | Performed by: NURSE PRACTITIONER

## 2019-08-06 PROCEDURE — 3074F PR MOST RECENT SYSTOLIC BLOOD PRESSURE < 130 MM HG: ICD-10-PCS | Mod: CPTII,S$GLB,, | Performed by: NURSE PRACTITIONER

## 2019-08-06 PROCEDURE — 3008F PR BODY MASS INDEX (BMI) DOCUMENTED: ICD-10-PCS | Mod: CPTII,S$GLB,, | Performed by: NURSE PRACTITIONER

## 2019-08-06 PROCEDURE — 3074F SYST BP LT 130 MM HG: CPT | Mod: CPTII,S$GLB,, | Performed by: NURSE PRACTITIONER

## 2019-08-06 PROCEDURE — 3078F PR MOST RECENT DIASTOLIC BLOOD PRESSURE < 80 MM HG: ICD-10-PCS | Mod: CPTII,S$GLB,, | Performed by: NURSE PRACTITIONER

## 2019-08-06 PROCEDURE — 99212 PR OFFICE/OUTPT VISIT, EST, LEVL II, 10-19 MIN: ICD-10-PCS | Mod: S$GLB,,, | Performed by: NURSE PRACTITIONER

## 2019-08-06 RX ORDER — GABAPENTIN 400 MG/1
400 CAPSULE ORAL 2 TIMES DAILY
Qty: 180 CAPSULE | Refills: 3 | Status: SHIPPED | OUTPATIENT
Start: 2019-08-06 | End: 2019-08-06 | Stop reason: SDUPTHER

## 2019-08-06 RX ORDER — GABAPENTIN 400 MG/1
400 CAPSULE ORAL 4 TIMES DAILY
Qty: 360 CAPSULE | Refills: 3 | Status: SHIPPED | OUTPATIENT
Start: 2019-08-06 | End: 2020-09-21 | Stop reason: SDUPTHER

## 2019-08-06 NOTE — TELEPHONE ENCOUNTER
I accidentally sent it in for BID first.  Please make sure the correct one gets filled.  Thanks!    LW

## 2019-08-06 NOTE — TELEPHONE ENCOUNTER
Called patient to schedule referred appointment from Cheryl Bradley NP with Dr. Oleary for treatment of right knee pain. No answer at phone number [phone] and left voicemail for patient to call office to schedule an appointment with Dr. Oleary.

## 2019-08-06 NOTE — PROGRESS NOTES
"Subjective:       Patient ID: Alessia Nelson is a 63 y.o. female.    Chief Complaint: Leg Pain (right leg)    Ms. Alessia Nelson is a 63 year old female who presents to the clinic today for acute right leg pain swelling. I last saw Ms. Nelson on 8/1/2019. Recently, she was traveling to [place] per vehicle and started experiencing right leg pain. Concerned for DVT's I performed US of Right lower extremity which was negative for DVT but showed possible popliteal cyst of right knee.  She reports the pain has not improved. She reports pain on the right knee, and rates pain 8/10.  She reports worsening pain when she bends her knee. She reports when she bends her right leg across her left leg, she reports "my right leg starts to jump. " She continues to see pain management and takes norco as instructed but she reports it helps with the pain, until it "wears off". She also reports right lower leg swelling. She does smoke but has decreased to 0.5 PPD and denies hx of blood clots in the past. She denies chest pain or shortness of breath with rest or activity.     Review of Systems   Constitutional: Negative for activity change, chills, fatigue, fever and unexpected weight change.   HENT: Negative for congestion, ear pain, postnasal drip, sinus pressure, sneezing, sore throat and tinnitus.    Eyes: Negative for redness and visual disturbance.   Respiratory: Negative for apnea, cough, chest tightness, shortness of breath and wheezing.    Cardiovascular: Positive for leg swelling. Negative for chest pain and palpitations.   Gastrointestinal: Negative for abdominal distention, abdominal pain, blood in stool, constipation, diarrhea, nausea (intermittent) and vomiting.   Endocrine: Negative for polydipsia and polyuria.   Genitourinary: Negative for difficulty urinating, dysuria, flank pain, frequency, hematuria and urgency.   Musculoskeletal: Positive for arthralgias, back pain and myalgias. Negative for joint swelling.       " " Sees pain management   Skin: Negative for color change, pallor and rash.   Allergic/Immunologic: Negative for environmental allergies, food allergies and immunocompromised state.   Neurological: Negative for dizziness, tremors, syncope, weakness, light-headedness and headaches.   Hematological: Negative for adenopathy. Does not bruise/bleed easily.   Psychiatric/Behavioral: Negative for agitation, confusion, decreased concentration, self-injury, sleep disturbance and suicidal ideas. The patient is not nervous/anxious and is not hyperactive.          Reviewed family, medical, surgical, and social history.    Objective:      /68 (BP Location: Right arm, Patient Position: Sitting, BP Method: Medium (Automatic))   Pulse 64   Temp 97.1 °F (36.2 °C) (Oral)   Resp 19   Ht 5' 7" (1.702 m)   Wt 87.1 kg (192 lb)   SpO2 95%   BMI 30.07 kg/m²   Physical Exam   Constitutional: She is oriented to person, place, and time. She appears well-developed and well-nourished. She is active and cooperative. No distress.   HENT:   Head: Normocephalic and atraumatic.   Right Ear: Hearing, tympanic membrane, external ear and ear canal normal. No drainage. No decreased hearing is noted.   Left Ear: Hearing, tympanic membrane, external ear and ear canal normal. No drainage. No decreased hearing is noted.   Nose: Nose normal. No mucosal edema, rhinorrhea or nasal deformity. No epistaxis.   Mouth/Throat: Uvula is midline, oropharynx is clear and moist and mucous membranes are normal. No uvula swelling. No oropharyngeal exudate or posterior oropharyngeal erythema. No tonsillar exudate.   Eyes: Pupils are equal, round, and reactive to light. Conjunctivae, EOM and lids are normal. Right eye exhibits no discharge. Left eye exhibits no discharge.   Neck: Trachea normal and full passive range of motion without pain. No JVD present. No tracheal tenderness and no muscular tenderness present. Carotid bruit is not present. No edema and no " erythema present. No thyroid mass and no thyromegaly present.   Cardiovascular: Normal rate, regular rhythm, S1 normal, S2 normal, normal heart sounds, intact distal pulses and normal pulses.   No murmur heard.  Pulmonary/Chest: Effort normal and breath sounds normal. No stridor. No tachypnea and no bradypnea. No respiratory distress. She has no decreased breath sounds. She has no wheezes. She has no rhonchi. She has no rales.   Abdominal: Soft. Normal appearance and bowel sounds are normal. She exhibits no distension and no abdominal bruit. There is no tenderness. There is no guarding and no CVA tenderness.   Musculoskeletal: She exhibits no edema, tenderness or deformity.        Right ankle: She exhibits swelling. She exhibits normal range of motion.        Right foot: There is normal range of motion.        Left foot: There is normal range of motion.   Lymphadenopathy:     She has no cervical adenopathy.   Neurological: She is alert and oriented to person, place, and time. She has normal strength. She exhibits normal muscle tone.   Skin: Skin is warm, dry and intact. Capillary refill takes less than 2 seconds. No abrasion, no laceration, no lesion and no rash noted. She is not diaphoretic. No cyanosis. Nails show no clubbing.   Psychiatric: She has a normal mood and affect. Her speech is normal and behavior is normal. Judgment and thought content normal. Cognition and memory are normal.       Assessment:       1. Popliteal cyst, right    2. Acute pain of right knee        Plan:       Popliteal cyst, right  -     Ambulatory referral to Orthopedics    Acute pain of right knee  -     Ambulatory referral to Orthopedics          PLAN:  - Discussed with patient the plan of care  - Refer Dr Chavira for cyst evaluation   - Medications reviewed. Medication side effects discussed. Patient has no questions or concerns at this time. Informed patient to notify me regarding any concerns.   - Continue monitoring symptoms  -  Rest often, elevate right leg  - Apply heat to right knee  - Informed patient to please notify me with any questions or concerns at anytime  - Follow up ordered for 4 weeks        Risks, benefits, and side effects were discussed with the patient. All questions were answered to the fullest satisfaction of the patient, and pt verbalized understanding and agreement to treatment plan. Pt was to call with any new or worsening symptoms, or present to the ER.

## 2019-08-07 DIAGNOSIS — G89.4 CHRONIC PAIN DISORDER: Primary | ICD-10-CM

## 2019-08-07 RX ORDER — GABAPENTIN 400 MG/1
400 CAPSULE ORAL 4 TIMES DAILY
Qty: 40 CAPSULE | Refills: 0 | Status: SHIPPED | OUTPATIENT
Start: 2019-08-07 | End: 2019-08-17

## 2019-08-07 NOTE — TELEPHONE ENCOUNTER
Patient requesting 10 day supply to be sent to local pharmacy to hold her over until mail-order arrives. Please advise. Thank you!        ----- Message from Dorys Kelly sent at 8/7/2019 11:50 AM CDT -----  Contact: pt  Pt states that she takes her last gabapentin (NEURONTIN) 400 MG capsule today and it takes 7-10 days for mail order pharmacy to send so she is needing a 10 day supply ,please sent to local pharmacy.  [id number] - [place] [address] [address]  [address] [place] [address]  Phone: [phone] Fax: [phone]    Pt can be reached at ..[phone] (home)

## 2019-08-13 ENCOUNTER — HOSPITAL ENCOUNTER (EMERGENCY)
Facility: HOSPITAL | Age: 64
Discharge: HOME | End: 2019-08-13
Attending: EMERGENCY MEDICINE
Payer: MEDICARE

## 2019-08-13 VITALS
BODY MASS INDEX: 29.66 KG/M2 | SYSTOLIC BLOOD PRESSURE: 130 MMHG | WEIGHT: 189 LBS | RESPIRATION RATE: 20 BRPM | OXYGEN SATURATION: 96 % | HEART RATE: 54 BPM | DIASTOLIC BLOOD PRESSURE: 61 MMHG | HEIGHT: 67 IN | TEMPERATURE: 99 F

## 2019-08-13 DIAGNOSIS — M25.561 RIGHT KNEE PAIN: ICD-10-CM

## 2019-08-13 DIAGNOSIS — R10.30 LOWER ABDOMINAL PAIN: Primary | ICD-10-CM

## 2019-08-13 LAB
ALBUMIN SERPL BCP-MCNC: 4.4 G/DL (ref 3.5–5.2)
ALP SERPL-CCNC: 77 U/L (ref 55–135)
ALT SERPL W/O P-5'-P-CCNC: 23 U/L (ref 10–44)
ANION GAP SERPL CALC-SCNC: 12 MMOL/L (ref 8–16)
AST SERPL-CCNC: 23 U/L (ref 10–40)
BASOPHILS # BLD AUTO: 0.05 K/UL (ref 0–0.2)
BASOPHILS NFR BLD: 0.4 % (ref 0–1.9)
BILIRUB SERPL-MCNC: 0.4 MG/DL (ref 0.1–1)
BUN SERPL-MCNC: 14 MG/DL (ref 8–23)
CALCIUM SERPL-MCNC: 10.4 MG/DL (ref 8.7–10.5)
CHLORIDE SERPL-SCNC: 101 MMOL/L (ref 95–110)
CO2 SERPL-SCNC: 23 MMOL/L (ref 23–29)
CREAT SERPL-MCNC: 0.9 MG/DL (ref 0.5–1.4)
DIFFERENTIAL METHOD: ABNORMAL
EOSINOPHIL # BLD AUTO: 0.2 K/UL (ref 0–0.5)
EOSINOPHIL NFR BLD: 1.9 % (ref 0–8)
ERYTHROCYTE [DISTWIDTH] IN BLOOD BY AUTOMATED COUNT: 14.1 % (ref 11.5–14.5)
EST. GFR  (AFRICAN AMERICAN): >60 ML/MIN/1.73 M^2
EST. GFR  (NON AFRICAN AMERICAN): >60 ML/MIN/1.73 M^2
GLUCOSE SERPL-MCNC: 96 MG/DL (ref 70–110)
HCT VFR BLD AUTO: 41.2 % (ref 37–48.5)
HGB BLD-MCNC: 13.2 G/DL (ref 12–16)
IMM GRANULOCYTES # BLD AUTO: 0.05 K/UL (ref 0–0.04)
IMM GRANULOCYTES NFR BLD AUTO: 0.4 % (ref 0–0.5)
LIPASE SERPL-CCNC: 18 U/L (ref 4–60)
LYMPHOCYTES # BLD AUTO: 3 K/UL (ref 1–4.8)
LYMPHOCYTES NFR BLD: 25.5 % (ref 18–48)
MCH RBC QN AUTO: 30.8 PG (ref 27–31)
MCHC RBC AUTO-ENTMCNC: 32 G/DL (ref 32–36)
MCV RBC AUTO: 96 FL (ref 82–98)
MONOCYTES # BLD AUTO: 0.8 K/UL (ref 0.3–1)
MONOCYTES NFR BLD: 6.7 % (ref 4–15)
NEUTROPHILS # BLD AUTO: 7.7 K/UL (ref 1.8–7.7)
NEUTROPHILS NFR BLD: 65.1 % (ref 38–73)
NRBC BLD-RTO: 0 /100 WBC
PLATELET # BLD AUTO: 252 K/UL (ref 150–350)
PMV BLD AUTO: 9.7 FL (ref 9.2–12.9)
POTASSIUM SERPL-SCNC: 4 MMOL/L (ref 3.5–5.1)
PROT SERPL-MCNC: 7.5 G/DL (ref 6–8.4)
RBC # BLD AUTO: 4.28 M/UL (ref 4–5.4)
SODIUM SERPL-SCNC: 136 MMOL/L (ref 136–145)
WBC # BLD AUTO: 11.87 K/UL (ref 3.9–12.7)

## 2019-08-13 PROCEDURE — 73562 X-RAY EXAM OF KNEE 3: CPT | Mod: 26,RT,, | Performed by: RADIOLOGY

## 2019-08-13 PROCEDURE — 83690 ASSAY OF LIPASE: CPT

## 2019-08-13 PROCEDURE — 73562 XR KNEE 3 VIEW RIGHT: ICD-10-PCS | Mod: 26,RT,, | Performed by: RADIOLOGY

## 2019-08-13 PROCEDURE — 99285 EMERGENCY DEPT VISIT HI MDM: CPT | Mod: 25

## 2019-08-13 PROCEDURE — 80053 COMPREHEN METABOLIC PANEL: CPT

## 2019-08-13 PROCEDURE — 63600175 PHARM REV CODE 636 W HCPCS: Performed by: EMERGENCY MEDICINE

## 2019-08-13 PROCEDURE — 85025 COMPLETE CBC W/AUTO DIFF WBC: CPT

## 2019-08-13 PROCEDURE — 74019 RADEX ABDOMEN 2 VIEWS: CPT | Mod: 26,,, | Performed by: RADIOLOGY

## 2019-08-13 PROCEDURE — 74019 RADEX ABDOMEN 2 VIEWS: CPT | Mod: TC,FY

## 2019-08-13 PROCEDURE — 74019 XR ABDOMEN FLAT AND ERECT: ICD-10-PCS | Mod: 26,,, | Performed by: RADIOLOGY

## 2019-08-13 PROCEDURE — 96374 THER/PROPH/DIAG INJ IV PUSH: CPT

## 2019-08-13 PROCEDURE — 73562 X-RAY EXAM OF KNEE 3: CPT | Mod: TC,FY,RT

## 2019-08-13 RX ORDER — ONDANSETRON 2 MG/ML
8 INJECTION INTRAMUSCULAR; INTRAVENOUS
Status: COMPLETED | OUTPATIENT
Start: 2019-08-13 | End: 2019-08-13

## 2019-08-13 RX ADMIN — ONDANSETRON 8 MG: 2 INJECTION INTRAMUSCULAR; INTRAVENOUS at 08:08

## 2019-08-14 ENCOUNTER — TELEPHONE (OUTPATIENT)
Dept: FAMILY MEDICINE | Facility: CLINIC | Age: 64
End: 2019-08-14

## 2019-08-14 ENCOUNTER — TELEPHONE (OUTPATIENT)
Dept: PAIN MEDICINE | Facility: CLINIC | Age: 64
End: 2019-08-14

## 2019-08-14 NOTE — ED NOTES
Pt ambulating in roman. Refusing to stay in room.  States she wants to wait outside because she is cold. Offered blankets. Pt agitated about wait time. MD aware.

## 2019-08-14 NOTE — TELEPHONE ENCOUNTER
----- Message from Taylor Moreno sent at 8/14/2019 10:19 AM CDT -----    Type:  Sooner Apoointment Request    Caller is requesting a sooner appointment.  Caller declined first available appointment listed below.  Caller will not accept being placed on the waitlist and is requesting a message be sent to doctor.    Name of Caller: pt  When is the first available appointment? 08/26  Symptoms: ER  FOLLOW UP  Best Call Back Number:  [phone]  Additional Information:    Needs to  Be  Seen asap

## 2019-08-14 NOTE — ED PROVIDER NOTES
Encounter Date: 8/13/2019       History     Chief Complaint   Patient presents with    Abdominal Pain    Nausea     63 y.o.female complains of lower abdominal cramping pain    Location:  Infra umbilical in both left and right lower quadrants    Radiation:  No acute radiation    Time of onset:  Reports onset of symptoms some 32 years ago but the past 3-4 days she has had increase in her symptoms    Timing:  Denies any specific timing with intake or output    Duration:  Pain last for minutes in nature subsides and then returns    Activity at time of onset:  No association with activity    Severity at onset:  Mild to moderate    Severity currently:  Moderate    Character:  Cramping    Provoking:  None known    Palliating:  None known    Prior episodes:  Many previous episodes    Associated:  Nausea    PMHx:  CHF COPD diverticulitis GERD hypertension thyroid disease pancreatitis    PSHx:  Past medical history includes open cholecystectomy, peritonitis secondary to bowel perforation, bowel resection,     Patient is followed by Dr. Fraga Gastroenterology and she reports that he is concerned that she has a stricture of her biliary duct and is pending ERCP    She denies any upper abdominal pain  Denies hematemesis  Denies fever    She is interviewed examined in bed 8  Abdominal exam is completely unremarkable at this time                      Review of patient's allergies indicates:   Allergen Reactions    Aspirin     Lortab [hydrocodone-acetaminophen] Itching    Phenergan [promethazine]     Promethazine hcl      Past Medical History:   Diagnosis Date    Acute pancreatitis     CHF (congestive heart failure)     COPD (chronic obstructive pulmonary disease)     Depression     Diverticulosis     GERD (gastroesophageal reflux disease)     Hypertension     Thyroid disease      Past Surgical History:   Procedure Laterality Date    ADRENAL GLAND SURGERY      CHOLECYSTECTOMY      COLONOSCOPY      HERNIA REPAIR       HYSTERECTOMY      INCISIONAL HERNIA REPAIR      instestine      PARATHYROID GLAND SURGERY      3 surgeries    Radiofrequency Ablation - RIGHT L3-5 RADIOFREQUENCY ABLATION WITH HALYARD COOLIEF THERMAL SYSTEM Right 6/10/2019    Performed by [facility]agon [facility] at [facility]    RIGHT L5-S3 MEDIAL BRANCH BLOCKS Right 5/27/2019    Performed by [facility]agon [facility] at [facility]    UPPER GASTROINTESTINAL ENDOSCOPY       Family History   Problem Relation Age of Onset    Stroke Maternal Grandmother     Cancer Maternal Grandfather     Stroke Mother     Heart disease Father      Social History     Tobacco Use    Smoking status: Current Every Day Smoker     Packs/day: 3.00     Years: 15.00     Pack years: 45.00    Smokeless tobacco: Current User   Substance Use Topics    Alcohol use: No    Drug use: No     Review of Systems   Constitutional: Negative.    Respiratory: Negative.    Cardiovascular: Negative.    Gastrointestinal: Positive for abdominal pain and diarrhea. Negative for abdominal distention, anal bleeding, nausea and vomiting.   Genitourinary: Negative.  Negative for difficulty urinating, dysuria, flank pain, frequency and pelvic pain.   Musculoskeletal: Negative.    Hematological: Negative.    All other systems reviewed and are negative.      Physical Exam     Initial Vitals [08/13/19 1833]   BP Pulse Resp Temp SpO2   (!) 132/100 (!) 58 20 98.6 °F (37 °C) (!) 94 %      MAP       --         Physical Exam    Nursing note and vitals reviewed.  Constitutional: She appears well-developed and well-nourished. She is not diaphoretic. No distress.   HENT:   Head: Normocephalic and atraumatic.   Nose: Nose normal.   Mouth/Throat: Oropharynx is clear and moist. No oropharyngeal exudate.   Eyes: Conjunctivae and EOM are normal. Pupils are equal, round, and reactive to light. Right eye exhibits no discharge. Left eye exhibits no discharge. No scleral icterus.   Neck: Normal range of motion. Neck supple.    Cardiovascular: Normal rate, regular rhythm, normal heart sounds and intact distal pulses. Exam reveals no gallop and no friction rub.    No murmur heard.  Pulmonary/Chest: Breath sounds normal. No respiratory distress. She has no wheezes. She has no rhonchi. She has no rales.   Abdominal: Soft. Bowel sounds are normal. She exhibits no distension and no mass. There is no tenderness. There is no rebound and no guarding.   Musculoskeletal: Normal range of motion. She exhibits no edema or tenderness.   Lymphadenopathy:     She has no cervical adenopathy.   Neurological: She is alert and oriented to person, place, and time. She has normal strength. No cranial nerve deficit or sensory deficit.   Skin: Skin is warm and dry. Capillary refill takes less than 2 seconds. No rash noted. No erythema. No pallor.   Psychiatric: She has a normal mood and affect. Her behavior is normal. Judgment and thought content normal.         ED Course   Procedures  Labs Reviewed   CBC W/ AUTO DIFFERENTIAL - Abnormal; Notable for the following components:       Result Value    Immature Grans (Abs) 0.05 (*)     All other components within normal limits   COMPREHENSIVE METABOLIC PANEL   LIPASE   URINALYSIS, REFLEX TO URINE CULTURE     Admission on 08/13/2019   Component Date Value Ref Range Status    WBC 08/13/2019 11.87  3.90 - 12.70 K/uL Final    RBC 08/13/2019 4.28  4.00 - 5.40 M/uL Final    Hemoglobin 08/13/2019 13.2  12.0 - 16.0 g/dL Final    Hematocrit 08/13/2019 41.2  37.0 - 48.5 % Final    Mean Corpuscular Volume 08/13/2019 96  82 - 98 fL Final    Mean Corpuscular Hemoglobin 08/13/2019 30.8  27.0 - 31.0 pg Final    Mean Corpuscular Hemoglobin Conc 08/13/2019 32.0  32.0 - 36.0 g/dL Final    RDW 08/13/2019 14.1  11.5 - 14.5 % Final    Platelets 08/13/2019 252  150 - 350 K/uL Final    MPV 08/13/2019 9.7  9.2 - 12.9 fL Final    Immature Granulocytes 08/13/2019 0.4  0.0 - 0.5 % Final    Gran # (ANC) 08/13/2019 7.7  1.8 - 7.7  K/uL Final    Immature Grans (Abs) 08/13/2019 0.05* 0.00 - 0.04 K/uL Final    Comment: Mild elevation in immature granulocytes is non specific and   can be seen in a variety of conditions including stress response,   acute inflammation, trauma and pregnancy. Correlation with other   laboratory and clinical findings is essential.      Lymph # 08/13/2019 3.0  1.0 - 4.8 K/uL Final    Mono # 08/13/2019 0.8  0.3 - 1.0 K/uL Final    Eos # 08/13/2019 0.2  0.0 - 0.5 K/uL Final    Baso # 08/13/2019 0.05  0.00 - 0.20 K/uL Final    nRBC 08/13/2019 0  0 /100 WBC Final    Gran% 08/13/2019 65.1  38.0 - 73.0 % Final    Lymph% 08/13/2019 25.5  18.0 - 48.0 % Final    Mono% 08/13/2019 6.7  4.0 - 15.0 % Final    Eosinophil% 08/13/2019 1.9  0.0 - 8.0 % Final    Basophil% 08/13/2019 0.4  0.0 - 1.9 % Final    Differential Method 08/13/2019 Automated   Final    Sodium 08/13/2019 136  136 - 145 mmol/L Final    Potassium 08/13/2019 4.0  3.5 - 5.1 mmol/L Final    Chloride 08/13/2019 101  95 - 110 mmol/L Final    CO2 08/13/2019 23  23 - 29 mmol/L Final    Glucose 08/13/2019 96  70 - 110 mg/dL Final    BUN, Bld 08/13/2019 14  8 - 23 mg/dL Final    Creatinine 08/13/2019 0.9  0.5 - 1.4 mg/dL Final    Calcium 08/13/2019 10.4  8.7 - 10.5 mg/dL Final    Total Protein 08/13/2019 7.5  6.0 - 8.4 g/dL Final    Albumin 08/13/2019 4.4  3.5 - 5.2 g/dL Final    Total Bilirubin 08/13/2019 0.4  0.1 - 1.0 mg/dL Final    Comment: For infants and newborns, interpretation of results should be based  on gestational age, weight and in agreement with clinical  observations.  Premature Infant recommended reference ranges:  Up to 24 hours.............<8.0 mg/dL  Up to 48 hours............<12.0 mg/dL  3-5 days..................<15.0 mg/dL  6-29 days.................<15.0 mg/dL      Alkaline Phosphatase 08/13/2019 77  55 - 135 U/L Final    AST 08/13/2019 23  10 - 40 U/L Final    ALT 08/13/2019 23  10 - 44 U/L Final    Anion Gap 08/13/2019 12   8 - 16 mmol/L Final    eGFR if African American 08/13/2019 >60.0  >60 mL/min/1.73 m^2 Final    eGFR if non African American 08/13/2019 >60.0  >60 mL/min/1.73 m^2 Final    Comment: Calculation used to obtain the estimated glomerular filtration  rate (eGFR) is the CKD-EPI equation.       Lipase 08/13/2019 18  4 - 60 U/L Final              Imaging Results    None       X-Rays:   Independently Interpreted Readings:   Abdomen: Nonspecific bowel gas.  No free air under diaphragm.  No air fluid levels or signs of obstruction. Flat and erect abdominal x-rays reveal nonspecific bowel gas pattern without any and dilation of loops or any air-fluid levels No masses.  Normal vessels.  No acute changes.   Other Readings:  Radiograph right knee reveals degenerative change in no acute fracture dislocation or lytic lesion    Medical Decision Making:   Clinical Tests:   Lab Tests: Ordered and Reviewed  Radiological Study: Ordered and Reviewed  ED Management:  Acute recurring lower abdominal pain cramping nature with a benign abdominal exam  Reassuring labs  Reassuring x-ray  Stable discharge in follow-up with Dr. Fraga with continued care as previous                      Clinical Impression:       ICD-10-CM ICD-9-CM   1. Lower abdominal pain R10.30 789.09   2. Right knee pain M25.561 719.46                                Edwin Chavez MD  08/13/19 1327

## 2019-08-14 NOTE — TELEPHONE ENCOUNTER
----- Message from Elvira Buitrago sent at 8/14/2019  3:49 PM CDT -----  Contact: Self  Patient was in the [facility] ER 8/13 and needs a followup appt and patient states she needs to be seen in [place] as soon as possible, [place] bad.  Call Back at [phone] (home).  Thanks

## 2019-08-14 NOTE — TELEPHONE ENCOUNTER
----- Message from Leonid Talbot sent at 8/14/2019 10:00 AM CDT -----  Contact: pt  Type: Needs Medical Advice    Who Called:  pt    Best Call Back Number:   Additional Information: pt states that the medication gabapentin (NEURONTIN) 400 MG capsule can not be filled by the [facility] due to 2 prescriptions of the same medication being sent over with different instructions. Pt states she is out of the medication. Pt would like to know if a temporary  amount sent to a local pharmacy. Pt states that [facility] has tried to reach the office twice and has not received a return call.   Please call to advise.     [facility] phone: [phone]  Order confirmation number: [id number]    Pt would like the temporary medication sent to the pharmacy below.    [facility] #[id number] [place] [id number] AT [facility] [address] [address]  Phone: [phone] Fax: [phone]

## 2019-08-14 NOTE — DISCHARGE INSTRUCTIONS
X-ray of the abdomen does not reveal any acute free air to suggest perforation nor of any bowel obstruction  Knee x-ray shows mild degenerative change of arthritis only  Laboratory data was reassuring including CBC comprehensive panel and lipase  Your stable to continue current care  Follow-up Dr. Fraga  Return to the ER as needed for any acute worsening

## 2019-08-14 NOTE — TELEPHONE ENCOUNTER
----- Message from Ariadna Cornejo sent at 8/14/2019 11:06 AM CDT -----  Type:  Patient Returning Call    Who Called:  Patient  Who Left Message for Patient:  Jimena  Does the patient know what this is regarding?:  ER visit  Best Call Back Number:  [phone] (home)

## 2019-08-14 NOTE — TELEPHONE ENCOUNTER
----- Message from Taylor Moreno sent at 8/14/2019 10:19 AM CDT -----    Type:  Sooner Apoointment Request    Caller is requesting a sooner appointment.  Caller declined first available appointment listed below.  Caller will not accept being placed on the waitlist and is requesting a message be sent to doctor.    Name of Caller: pt  When is the first available appointment? 08/26  Symptoms: ER  FOLLOW UP  Best Call Back Number:  [phone]  Additional Information:    Needs to  Be  Seen asap

## 2019-08-14 NOTE — TELEPHONE ENCOUNTER
Spoke with [facility], canceled incorrect rx for gabapentin 400mg. Processed correct rx and insurance approved. Spoke with patient, advised of this information, also advised of 10 day supply at [facility], patient voiced understanding.

## 2019-08-15 ENCOUNTER — OFFICE VISIT (OUTPATIENT)
Dept: GASTROENTEROLOGY | Facility: CLINIC | Age: 64
End: 2019-08-15
Payer: MEDICARE

## 2019-08-15 VITALS
OXYGEN SATURATION: 97 % | DIASTOLIC BLOOD PRESSURE: 73 MMHG | BODY MASS INDEX: 28.72 KG/M2 | RESPIRATION RATE: 18 BRPM | WEIGHT: 183 LBS | SYSTOLIC BLOOD PRESSURE: 117 MMHG | HEART RATE: 53 BPM | HEIGHT: 67 IN

## 2019-08-15 DIAGNOSIS — K52.9 CHRONIC DIARRHEA: ICD-10-CM

## 2019-08-15 DIAGNOSIS — K21.9 GASTROESOPHAGEAL REFLUX DISEASE WITHOUT ESOPHAGITIS: ICD-10-CM

## 2019-08-15 DIAGNOSIS — R10.9 ABDOMINAL PAIN, UNSPECIFIED ABDOMINAL LOCATION: ICD-10-CM

## 2019-08-15 DIAGNOSIS — R11.0 NAUSEA: Primary | ICD-10-CM

## 2019-08-15 PROCEDURE — 3078F PR MOST RECENT DIASTOLIC BLOOD PRESSURE < 80 MM HG: ICD-10-PCS | Mod: CPTII,S$GLB,, | Performed by: INTERNAL MEDICINE

## 2019-08-15 PROCEDURE — 99213 OFFICE O/P EST LOW 20 MIN: CPT | Mod: S$GLB,,, | Performed by: INTERNAL MEDICINE

## 2019-08-15 PROCEDURE — 3074F SYST BP LT 130 MM HG: CPT | Mod: CPTII,S$GLB,, | Performed by: INTERNAL MEDICINE

## 2019-08-15 PROCEDURE — 3008F PR BODY MASS INDEX (BMI) DOCUMENTED: ICD-10-PCS | Mod: CPTII,S$GLB,, | Performed by: INTERNAL MEDICINE

## 2019-08-15 PROCEDURE — 99999 PR PBB SHADOW E&M-EST. PATIENT-LVL IV: CPT | Mod: PBBFAC,,, | Performed by: INTERNAL MEDICINE

## 2019-08-15 PROCEDURE — 3078F DIAST BP <80 MM HG: CPT | Mod: CPTII,S$GLB,, | Performed by: INTERNAL MEDICINE

## 2019-08-15 PROCEDURE — 3008F BODY MASS INDEX DOCD: CPT | Mod: CPTII,S$GLB,, | Performed by: INTERNAL MEDICINE

## 2019-08-15 PROCEDURE — 99213 PR OFFICE/OUTPT VISIT, EST, LEVL III, 20-29 MIN: ICD-10-PCS | Mod: S$GLB,,, | Performed by: INTERNAL MEDICINE

## 2019-08-15 PROCEDURE — 3074F PR MOST RECENT SYSTOLIC BLOOD PRESSURE < 130 MM HG: ICD-10-PCS | Mod: CPTII,S$GLB,, | Performed by: INTERNAL MEDICINE

## 2019-08-15 PROCEDURE — 99999 PR PBB SHADOW E&M-EST. PATIENT-LVL IV: ICD-10-PCS | Mod: PBBFAC,,, | Performed by: INTERNAL MEDICINE

## 2019-08-15 RX ORDER — ONDANSETRON 4 MG/1
8 TABLET, FILM COATED ORAL EVERY 8 HOURS PRN
Qty: 100 TABLET | Refills: 0 | Status: SHIPPED | OUTPATIENT
Start: 2019-08-15 | End: 2021-12-20 | Stop reason: SDUPTHER

## 2019-08-15 NOTE — PATIENT INSTRUCTIONS
She will continue current medications.  She will follow up with the referral gastroenterologist for ERCP for evaluation of her abnormal biliary tract.  She continues her other medications will follow up in the Pain Clinic.  She continues her nutritious diet.

## 2019-08-15 NOTE — PROGRESS NOTES
She will continue the reflux regimen.  She continues her nutritious diet which is low fat.  I have encouraged weight reduction.  I have encouraged smoking cessation program she does not want to try the Colestid because she is afraid that she would be constipated and therefore could not have a bowel movement.  She will continue current medications diet and activities.  She follows up in the Pain Clinic.

## 2019-08-15 NOTE — LETTER
August 15, 2019      Cheryl Bradley, NP  [address]  [place] [address]           [facility]  [address], [address]  [place] [address]  Phone: [phone]  Fax: [phone]          Patient: Alessia Nelson   MR Number: [id number]   YOB: 1955   Date of Visit: 8/15/2019       Dear Cheryl Bradley:    Thank you for referring Alessia Nelson to me for evaluation. Attached you will find relevant portions of my assessment and plan of care.    If you have questions, please do not hesitate to call me. I look forward to following Alessia Nelson along with you.    Sincerely,    Manpreet Fraga MD    Enclosure  CC:  No Recipients    If you would like to receive this communication electronically, please contact [email or web address] or [phone] to request more information on [employer] access.    For providers and/or their staff who would like to refer a patient to [facility], please contact us through our one-stop-shop provider referral line, [facility], at [phone].    If you feel you have received this communication in error or would no longer like to receive these types of communications, please e-mail [email or web address]

## 2019-08-15 NOTE — PROGRESS NOTES
Subjective:       Patient ID: Alessia Nelson is a 63 y.o. female.    Chief Complaint: Abdominal Pain    She was recently in the emergency room for abdominal pain.  The the evaluation was unrevealing.  She has had a bowel obstruction and peritonitis.  She has had a cholecystectomy.  She has chronic diarrhea and does not want take the Colestid because it makes me constipated.  She denies any hematemesis hematochezia dysphagia jaundice.  She has had occasional pyrosis.  She has had nausea and continues her Zofran.  She denies vomiting.  She she has an abnormal biliary system and is scheduled to see the ER [facility] gastroenterologist this week.  She has had several episodes of pancreatitis.  She denies fever chills jaundice or bleeding.  She has right knee pain and there is a cyst behind my knee .  She has chronic back pain is being seen in the Pain Clinic.  She describes having loose small semi solid to liquid bowel movements without incontinence but with some urgency.  She does not related to specific food.  The abdominal pain and bowel irregularity been going on for years.  She denies alcohol.      Allergies:  Review of patient's allergies indicates:   Allergen Reactions    Aspirin     Lortab [hydrocodone-acetaminophen] Itching    Phenergan [promethazine]     Promethazine hcl        Medications:    Current Outpatient Medications:     acetaminophen (TYLENOL) 325 MG tablet, Take 2 tablets (650 mg total) by mouth every 6 (six) hours as needed (or equal to 101 degree F)., Disp: , Rfl:     allopurinol (ZYLOPRIM) 300 MG tablet, , Disp: , Rfl:     amlodipine-benazepril 5-10 mg (LOTREL) 5-10 mg per capsule, , Disp: , Rfl:     buPROPion (WELLBUTRIN XL) 150 MG TB24 tablet, Take 1 tablet (150 mg total) by mouth once daily., Disp: 90 tablet, Rfl: 3    clopidogrel (PLAVIX) 75 mg tablet, Take 1 tablet (75 mg total) by mouth once daily., Disp: 14 tablet, Rfl: 0    colestipol (COLESTID) 1 gram Tab, , Disp: , Rfl:      escitalopram (LEXAPRO) 20 MG tablet, Take 20 mg by mouth once daily., Disp: , Rfl:     gabapentin (NEURONTIN) 400 MG capsule, Take 1 capsule (400 mg total) by mouth 4 (four) times daily., Disp: 360 capsule, Rfl: 3    gabapentin (NEURONTIN) 400 MG capsule, Take 1 capsule (400 mg total) by mouth 4 (four) times daily. for 10 days, Disp: 40 capsule, Rfl: 0    glycopyrrolate (ROBINUL) 1 mg Tab, TK 1 T PO BID, Disp: 90 tablet, Rfl: 3    HYDROcodone-acetaminophen (NORCO) 7.5-325 mg per tablet, Take 1 tablet by mouth 2 (two) times daily as needed for Pain., Disp: 60 tablet, Rfl: 0    levothyroxine (SYNTHROID) 50 MCG tablet, Take 50 mcg by mouth once daily., Disp: , Rfl:     lubiprostone (AMITIZA) 24 MCG Cap, Take 1 capsule (24 mcg total) by mouth 2 (two) times daily., Disp: 60 capsule, Rfl: 0    metoprolol tartrate (LOPRESSOR) 100 MG tablet, Take 1 tablet (100 mg total) by mouth 2 (two) times daily., Disp: 90 tablet, Rfl: 3    ondansetron (ZOFRAN) 4 MG tablet, Take 2 tablets (8 mg total) by mouth every 8 (eight) hours as needed for Nausea., Disp: 100 tablet, Rfl: 0    pantoprazole (PROTONIX) 40 MG tablet, Take 40 mg by mouth once daily., Disp: , Rfl:     potassium chloride SA (K-DUR,KLOR-CON) 20 MEQ tablet, Take 1 tablet (20 mEq total) by mouth once daily., Disp: 30 tablet, Rfl: 3    rosuvastatin (CRESTOR) 40 MG Tab, Take 1 tablet (40 mg total) by mouth once daily., Disp: 90 tablet, Rfl: 3    traZODone (DESYREL) 100 MG tablet, Take 1 tablet (100 mg total) by mouth every evening., Disp: 30 tablet, Rfl: 3    Past Medical History:   Diagnosis Date    Acute pancreatitis     CHF (congestive heart failure)     COPD (chronic obstructive pulmonary disease)     Depression     Diverticulosis     GERD (gastroesophageal reflux disease)     Hypertension     Thyroid disease        Past Surgical History:   Procedure Laterality Date    ADRENAL GLAND SURGERY      CHOLECYSTECTOMY      COLONOSCOPY      HERNIA REPAIR       HYSTERECTOMY      INCISIONAL HERNIA REPAIR      instestine      PARATHYROID GLAND SURGERY      3 surgeries    Radiofrequency Ablation - RIGHT L3-5 RADIOFREQUENCY ABLATION WITH HALYARD COOLIEF THERMAL SYSTEM Right 6/10/2019    Performed [facility]agon [facility] at [facility]    RIGHT L5-S3 MEDIAL BRANCH BLOCKS Right 5/27/2019    Performed by [facility]agon [facility] at [facility] OR    UPPER GASTROINTESTINAL ENDOSCOPY           Review of Systems   Constitutional: Negative for appetite change, fever and unexpected weight change.   HENT: Negative for trouble swallowing.         No jaundice.   Respiratory: Negative for cough, shortness of breath and wheezing.         She is a cigarette smoker.  She has occasional coughing.  She denies dyspnea or aspiration.  She is able to perform her usual activities without cardiopulmonary restrictions.  She has been offered the smoking cessation program   Cardiovascular: Negative for chest pain.        Her hypertension hyperlipidemia well controlled.  She is followed by her cardiologist and she states that she is not having exertional chest pain but she is not particularly P physically active.  She denies rhythm disturbances.   Gastrointestinal: Positive for abdominal pain, diarrhea and nausea. Negative for abdominal distention, anal bleeding, blood in stool, constipation and rectal pain.   Musculoskeletal: Positive for arthralgias, back pain, gait problem, myalgias, neck pain and neck stiffness.   Skin: Negative for pallor and rash.   Neurological: Negative for dizziness, seizures, syncope, speech difficulty, weakness and numbness.   Hematological: Negative for adenopathy.   Psychiatric/Behavioral: Negative for confusion.       Objective:      Physical Exam   Constitutional: She is oriented to person, place, and time. She appears well-developed and well-nourished.   Well-nourished well-hydrated obese nonicteric white female   HENT:   Head: Normocephalic.   Eyes: Pupils are equal, round,  and reactive to light. EOM are normal.   Neck: Normal range of motion. Neck supple. No tracheal deviation present. No thyromegaly present.   Cardiovascular: Normal rate, regular rhythm and normal heart sounds.   Pulmonary/Chest: Effort normal and breath sounds normal.   Abdominal: Soft. Bowel sounds are normal. She exhibits no distension and no mass. There is tenderness. There is no rebound and no guarding. No hernia.   The abdomen is obese than there healed scars.  Organomegaly is absent.  There is mild tenderness to palpation over the entire abdomen without rebound or guarding.  Bowel sounds are normal.   Musculoskeletal:   She can ambulate without difficulty.  She can go from the sitting to the standing position without difficulty. Her gait is steady.  She can get on the examination table with minimal assistance.   Lymphadenopathy:     She has no cervical adenopathy.   Neurological: She is alert and oriented to person, place, and time. No cranial nerve deficit.   Skin: Skin is warm and dry.   Psychiatric: She has a normal mood and affect. Her behavior is normal.   Vitals reviewed.        Plan:       Nausea  -     ondansetron (ZOFRAN) 4 MG tablet; Take 2 tablets (8 mg total) by mouth every 8 (eight) hours as needed for Nausea.  Dispense: 100 tablet; Refill: 0    Gastroesophageal reflux disease without esophagitis    Chronic diarrhea    Abdominal pain, unspecified abdominal location

## 2019-08-20 ENCOUNTER — OFFICE VISIT (OUTPATIENT)
Dept: PAIN MEDICINE | Facility: CLINIC | Age: 64
End: 2019-08-20
Payer: MEDICARE

## 2019-08-20 ENCOUNTER — TELEPHONE (OUTPATIENT)
Dept: PAIN MEDICINE | Facility: CLINIC | Age: 64
End: 2019-08-20

## 2019-08-20 VITALS
TEMPERATURE: 98 F | BODY MASS INDEX: 28.63 KG/M2 | WEIGHT: 182.44 LBS | HEIGHT: 67 IN | HEART RATE: 52 BPM | RESPIRATION RATE: 16 BRPM | DIASTOLIC BLOOD PRESSURE: 65 MMHG | SYSTOLIC BLOOD PRESSURE: 116 MMHG

## 2019-08-20 DIAGNOSIS — M70.61 GREATER TROCHANTERIC BURSITIS OF RIGHT HIP: ICD-10-CM

## 2019-08-20 DIAGNOSIS — Z98.1 HISTORY OF LUMBAR SPINAL FUSION: ICD-10-CM

## 2019-08-20 DIAGNOSIS — Z79.01 CHRONIC ANTICOAGULATION: ICD-10-CM

## 2019-08-20 DIAGNOSIS — Z79.891 OPIOID CONTRACT EXISTS: ICD-10-CM

## 2019-08-20 DIAGNOSIS — M51.36 DDD (DEGENERATIVE DISC DISEASE), LUMBAR: ICD-10-CM

## 2019-08-20 DIAGNOSIS — M43.07 LUMBOSACRAL SPONDYLOLYSIS: ICD-10-CM

## 2019-08-20 DIAGNOSIS — G89.4 CHRONIC PAIN DISORDER: Primary | ICD-10-CM

## 2019-08-20 DIAGNOSIS — M53.3 SACROILIAC JOINT PAIN: ICD-10-CM

## 2019-08-20 DIAGNOSIS — M96.1 POSTLAMINECTOMY SYNDROME OF LUMBAR REGION: ICD-10-CM

## 2019-08-20 PROCEDURE — 3078F PR MOST RECENT DIASTOLIC BLOOD PRESSURE < 80 MM HG: ICD-10-PCS | Mod: CPTII,S$GLB,, | Performed by: ANESTHESIOLOGY

## 2019-08-20 PROCEDURE — 99999 PR PBB SHADOW E&M-EST. PATIENT-LVL III: CPT | Mod: PBBFAC,,, | Performed by: ANESTHESIOLOGY

## 2019-08-20 PROCEDURE — 99214 PR OFFICE/OUTPT VISIT, EST, LEVL IV, 30-39 MIN: ICD-10-PCS | Mod: 25,S$GLB,, | Performed by: ANESTHESIOLOGY

## 2019-08-20 PROCEDURE — 3008F BODY MASS INDEX DOCD: CPT | Mod: CPTII,S$GLB,, | Performed by: ANESTHESIOLOGY

## 2019-08-20 PROCEDURE — 20610 DRAIN/INJ JOINT/BURSA W/O US: CPT | Mod: RT,S$GLB,, | Performed by: ANESTHESIOLOGY

## 2019-08-20 PROCEDURE — 20610 PR DRAIN/INJECT LARGE JOINT/BURSA: ICD-10-PCS | Mod: RT,S$GLB,, | Performed by: ANESTHESIOLOGY

## 2019-08-20 PROCEDURE — 3074F SYST BP LT 130 MM HG: CPT | Mod: CPTII,S$GLB,, | Performed by: ANESTHESIOLOGY

## 2019-08-20 PROCEDURE — 99214 OFFICE O/P EST MOD 30 MIN: CPT | Mod: 25,S$GLB,, | Performed by: ANESTHESIOLOGY

## 2019-08-20 PROCEDURE — 3074F PR MOST RECENT SYSTOLIC BLOOD PRESSURE < 130 MM HG: ICD-10-PCS | Mod: CPTII,S$GLB,, | Performed by: ANESTHESIOLOGY

## 2019-08-20 PROCEDURE — 3078F DIAST BP <80 MM HG: CPT | Mod: CPTII,S$GLB,, | Performed by: ANESTHESIOLOGY

## 2019-08-20 PROCEDURE — 99999 PR PBB SHADOW E&M-EST. PATIENT-LVL III: ICD-10-PCS | Mod: PBBFAC,,, | Performed by: ANESTHESIOLOGY

## 2019-08-20 PROCEDURE — 3008F PR BODY MASS INDEX (BMI) DOCUMENTED: ICD-10-PCS | Mod: CPTII,S$GLB,, | Performed by: ANESTHESIOLOGY

## 2019-08-20 RX ORDER — HYDROCODONE BITARTRATE AND ACETAMINOPHEN 7.5; 325 MG/1; MG/1
1 TABLET ORAL 2 TIMES DAILY PRN
Qty: 60 TABLET | Refills: 0 | Status: SHIPPED | OUTPATIENT
Start: 2019-08-20 | End: 2019-09-19

## 2019-08-20 RX ADMIN — BUPIVACAINE HYDROCHLORIDE 45 MG: 5 INJECTION, SOLUTION EPIDURAL; INTRACAUDAL at 10:08

## 2019-08-20 RX ADMIN — LIDOCAINE HYDROCHLORIDE 3 ML: 10 INJECTION INFILTRATION; PERINEURAL at 10:08

## 2019-08-20 RX ADMIN — BETAMETHASONE SODIUM PHOSPHATE AND BETAMETHASONE ACETATE 6 MG: 3; 3 INJECTION, SUSPENSION INTRA-ARTICULAR; INTRALESIONAL; INTRAMUSCULAR; SOFT TISSUE at 10:08

## 2019-08-20 NOTE — LETTER
August 22, 2019      Cheryl Bradley, NP  [address]  [place] [address]           [facility]  [address] [address] [facility] [place] [address]  Phone: [phone]  Fax: [phone]          Patient: Alessia Nelson   MR Number: [id number]   YOB: 1955   Date of Visit: 8/20/2019       Dear Cheryl Bradley:    Thank you for referring Alessia Nelson to me for evaluation. Attached you will find relevant portions of my assessment and plan of care.    If you have questions, please do not hesitate to call me. I look forward to following Alessia Nelson along with you.    Sincerely,    Sneha Aragon MD    Enclosure  CC:  No Recipients    If you would like to receive this communication electronically, please contact [email or web address] or [phone] to request more information on [facility] access.    For providers and/or their staff who would like to refer a patient to [facility], please contact us through our one-stop-shop provider referral line, [facility], at [phone].    If you feel you have received this communication in error or would no longer like to receive these types of communications, please e-mail [email or web address]

## 2019-08-20 NOTE — TELEPHONE ENCOUNTER
Spoke with patient, advised that we do not have availability in the afternoon, patient voiced understanding and states she will try to make it this am.      ----- Message from Rula Jaramillo sent at 8/20/2019  9:03 AM CDT -----  Patient [phone] has an appt with Dr Aragon this morning at 10:45am but is in a lot of pain and does not have a ride/she states that she can not drive herself/she is asking if she can come into office this afternoon?/please advise

## 2019-08-20 NOTE — PROGRESS NOTES
Subjective:     Patient ID: Alessia Nelson is a 63 y.o. female.    Chief Complaint: Pain    Consulted by: Lynne Parsons NP     Disclaimer: This note was generated using voice recognition software.  There may be a typographical errors that were missed during proofreading.      HPI:    Alessia Nelson is a 63 y.o. female who presents today with chronic low back pain.  She has a history of 2 lumbar surgeries in the past as well as a cervical surgery.  She reports bilateral low back pain that radiates into her right buttock and the posterior aspect of her right leg to her knee and sometimes to her calf.  She does have numbness in bilateral feet attributed to diabetic peripheral neuropathy. This is separate from her low back symptoms.   This pain is described in detail below.    Of note, she has had 4 falls, one from her BP dropping, one while she was asleep, and two others of unknown causes.    She carries a diagnosis of fibromyalgia, but she states that she does not believe she has this.    Aggravating factors:  Sitting, standing, walking, bending/twisting, lifting, exercise    Mitigating factors:  Rest, lying down, medication    Previously seeing: Dr. Cardozo, Kyler Watson PA-C, Dr. Brent Lewis    Interval History (6/25/2019):  She returns today for follow up.  She reports that she has not gotten any relief from the radiofrequency ablation yet.  Nothing has been helpful for the pain.    [place] [facility]  Office visit from 04/18/2019:  Show ongoing treatment for her low back pain. Makes a note of a recent SI joint injection with no benefit.  Makes note of an EMG which shows neuropathy.  Shows ongoing treatment with Norco 7.5/325 mg twice daily as needed with no issues.  Other office visits from 02/26/2019, 12/20/2018, 10/25/2018 all show stable treatment with Norco 7.5/325 mg twice daily as needed with no issues.  One office visit from 11/2014 show treatment with Norco 10/325 mg  with no issues    Interval History (7/22/2019):  She returns today for follow up.  She reports that the right GT bursa injection provided greater than 50% benefit at 1st, lasting until this week.  Norco has been helpful for the pain, but methocarbamol 2 tablets at nighttime has not been as helpful.  She has not been able to get the foam roller that we discussed at our last visit.  She wonders if a TENS unit can be helpful.  She saw Dr. Yuan, who ordered a bone scan to look at a possible fracture at L5 near the L5/S1 facet joint.  She has not had this done yet.    Of note, she continues to have difficulty sleeping.  She admits to using her tablet in the bed.  She reports that she was sleeping better before the amitriptyline and lorazepam were stopped; however, she does not wish to restart the that she has not had any falls since stopping these.    Interval History (8/20/2019):  She returns today for follow up.  She reports that she is having worsening stomach pain. This is actually her primary pain complaint today.  This is being treated up by Dr. Fraga.  Norco has been helpful for her low back and knee pain. She continues to take gabapentin and methocarbamol as well with benefit.  Despite this, she continues to have severe pain that is limiting her functioning.  She asks about the report from her bone scan.    Of note, her right bursa is hurting today.  She asks for repeat injection. This was very helpful in June    Physical Therapy:  Yes, just finished 2 weeks, she is doing the HEP (stretching) most days.      Non-pharmacologic Treatment:     · Ice/Heat: Heat is helpful (her dog lies on her back)  · TENS: Tried at the chiropractor  · Massage: Yes, in the past, helpful  · Chiropractic care:  Yes, in the past, helpful  · Acupuncture: She is interested in trying this.  · Other: Uses a body pillow for sleep         Pain Medications:         · Currently taking: Tylenol 4644-4193 BID PRN (takes it for before the  "Norco), Norco 7.5/325 mg BID PRN (she tries to limit this as much as possible), methocarbamol 750-1500 mg QHS, gabapentin 400 mg BID, trazodone 100 mg QHS, Wellbutrin, Lexapro    · Has tried in the past:    · Opioids: Oxycodone (doesn't like)  · NSAIDS: Celebrex (caused diarrhea)  · Tylenol: Tylenol 2227-3521   · Muscle relaxants: tizanidine (caused SE of not remembering and seeing things)  · TCAs: Amitriptyline (stopped due to falls)  · SNRIs: Not tried  · Anticonvulsants: On gabapentin  · topical creams: Lotion for nighttime, bath oil  · Other: Lorazepam (stopped this)    Blood thinners: Plavix for     Interventional Therapies:   · L4/5 Epidural steroid injection: For many years.  She is unsure of the last one  · Right SI joint: No benefit  · Right L5-S3 MBB: positive  · 06/10/2019:  Right L5-S3 RFA:  50% benefit  · 06/25/2019:  Right greater trochanteric bursa injection:  50% benefit    Relevant Surgeries:   · 2006: Lumbar surgery  · 2007: Lumbar surgery  · 2012: Lumbar surgery, PISF  · 2006: Cervical surgery    Affecting sleep? Yes, she cannot sleep due to "brain not turning off"    Affecting daily activities? Yes, she is unable to mop, cook, or do most activities    Depressive symptoms? Yes, she has been diagnosed with depression          · SI/HI? No    Work status: Criminal Justice, disabled due to pain    Prescription Monitoring Program database:  Reviewed and consistent with medication use as prescribed.    Last 3 PDI Scores 8/20/2019 7/22/2019 6/25/2019   Pain Disability Index (PDI) 70 45 22       Opioid Risk Score       Value Time User    Opioid Risk Score  4 5/21/2019  3:57 PM Clare Alfonso MA          GENERAL:  Positive for fatigue.  No weight loss, malaise or fevers.  HEENT:   No recent changes in vision or hearing  NECK:  Negative for lumps, no difficulty with swallowing.  RESPIRATORY:  Positive for cough.  Negative for wheezing or shortness of breath, patient denies any recent " URI.  CARDIOVASCULAR:  Negative for chest pain, leg swelling or palpitations.  GI:  Positive for diarrhea.  Negative for abdominal discomfort, blood in stools or black stools or change in bowel habits.  MUSCULOSKELETAL:  See HPI.  SKIN:  Positive for color change, texture change.  Negative for lesions, rash, and itching.  PSYCH:  Positive for anxiety.  No other mood disorder or recent psychosocial stressors.    HEMATOLOGY/LYMPHOLOGY:  Positive for easy bruising.  Negative for prolonged bleeding or swollen nodes.    ENDO:  Positive for hypothyroidism and diabetes.    NEURO:   Positive for headaches, head trauma (due to recent fall), loss of balance, difficulty sleeping.  No history of syncope, paralysis, seizures or tremors.  All other reviewed and negative other than HPI.          Past Medical History:   Diagnosis Date    Acute pancreatitis     CHF (congestive heart failure)     COPD (chronic obstructive pulmonary disease)     Depression     Diverticulosis     GERD (gastroesophageal reflux disease)     Hypertension     Thyroid disease        Past Surgical History:   Procedure Laterality Date    ADRENAL GLAND SURGERY      CHOLECYSTECTOMY      COLONOSCOPY      HERNIA REPAIR      HYSTERECTOMY      INCISIONAL HERNIA REPAIR      instestine      PARATHYROID GLAND SURGERY      3 surgeries    Radiofrequency Ablation - RIGHT L3-5 RADIOFREQUENCY ABLATION WITH HALYARD COOLIEF THERMAL SYSTEM Right 6/10/2019    Performed [facility] at [facility]    RIGHT L5-S3 MEDIAL BRANCH BLOCKS Right 5/27/2019    [facility] at [facility]    UPPER GASTROINTESTINAL ENDOSCOPY         Review of patient's allergies indicates:   Allergen Reactions    Aspirin     Lortab [hydrocodone-acetaminophen] Itching    Phenergan [promethazine]     Promethazine hcl        Current Outpatient Medications   Medication Sig Dispense Refill    acetaminophen (TYLENOL) 325 MG tablet Take 2 tablets (650 mg total) by  mouth every 6 (six) hours as needed (or equal to 101 degree F).      allopurinol (ZYLOPRIM) 300 MG tablet       amlodipine-benazepril 5-10 mg (LOTREL) 5-10 mg per capsule       buPROPion (WELLBUTRIN XL) 150 MG TB24 tablet Take 1 tablet (150 mg total) by mouth once daily. 90 tablet 3    clopidogrel (PLAVIX) 75 mg tablet Take 1 tablet (75 mg total) by mouth once daily. 14 tablet 0    colestipol (COLESTID) 1 gram Tab       escitalopram (LEXAPRO) 20 MG tablet Take 20 mg by mouth once daily.      gabapentin (NEURONTIN) 400 MG capsule Take 1 capsule (400 mg total) by mouth 4 (four) times daily. 360 capsule 3    glycopyrrolate (ROBINUL) 1 mg Tab TK 1 T PO BID 90 tablet 3    HYDROcodone-acetaminophen (NORCO) 7.5-325 mg per tablet Take 1 tablet by mouth 2 (two) times daily as needed for Pain. 60 tablet 0    levothyroxine (SYNTHROID) 50 MCG tablet Take 50 mcg by mouth once daily.      lubiprostone (AMITIZA) 24 MCG Cap Take 1 capsule (24 mcg total) by mouth 2 (two) times daily. 60 capsule 0    metoprolol tartrate (LOPRESSOR) 100 MG tablet Take 1 tablet (100 mg total) by mouth 2 (two) times daily. 90 tablet 3    ondansetron (ZOFRAN) 4 MG tablet Take 2 tablets (8 mg total) by mouth every 8 (eight) hours as needed for Nausea. 100 tablet 0    pantoprazole (PROTONIX) 40 MG tablet Take 40 mg by mouth once daily.      potassium chloride SA (K-DUR,KLOR-CON) 20 MEQ tablet Take 1 tablet (20 mEq total) by mouth once daily. 30 tablet 3    rosuvastatin (CRESTOR) 40 MG Tab Take 1 tablet (40 mg total) by mouth once daily. 90 tablet 3    traZODone (DESYREL) 100 MG tablet Take 1 tablet (100 mg total) by mouth every evening. 30 tablet 3     No current facility-administered medications for this visit.        Family History   Problem Relation Age of Onset    Stroke Maternal Grandmother     Cancer Maternal Grandfather     Stroke Mother     Heart disease Father        Social History     Socioeconomic History    Marital status:  "Single     Spouse name: Not on file    Number of children: 0    Years of education: Not on file    Highest education level: Not on file   Occupational History    Not on file   Social Needs    Financial resource strain: Not on file    Food insecurity:     Worry: Not on file     Inability: Not on file    Transportation needs:     Medical: Not on file     Non-medical: Not on file   Tobacco Use    Smoking status: Current Every Day Smoker     Packs/day: 3.00     Years: 15.00     Pack years: 45.00    Smokeless tobacco: Current User   Substance and Sexual Activity    Alcohol use: No    Drug use: No    Sexual activity: Not Currently   Lifestyle    Physical activity:     Days per week: Not on file     Minutes per session: Not on file    Stress: Not on file   Relationships    Social connections:     Talks on phone: Not on file     Gets together: Not on file     Attends [employer] service: Not on file     Active member of club or organization: Not on file     Attends meetings of clubs or organizations: Not on file     Relationship status: Not on file   Other Topics Concern    Not on file   Social History Narrative    Not on file       Objective:     Vitals:    08/20/19 1044   BP: 116/65   Pulse: (!) 52   Resp: 16   Temp: 98 °F (36.7 °C)   TempSrc: Oral   Weight: 82.7 kg (182 lb 6.9 oz)   Height: 5' 7" (1.702 m)   PainSc:   8       GEN:  Well developed, well nourished.  No acute distress. No pain behavior.  HEENT:  No trauma.  Mucous membranes moist.  Nares patent bilaterally.  PSYCH: Normal affect. Thought content appropriate.  CHEST:  Breathing symmetric.  No audible wheezing.  ABD: Soft, non-distended.  SKIN:  Warm, pink, dry.  No rash on exposed areas.    EXT:  No cyanosis, clubbing, or edema.  No color change or changes in nail or hair growth.  NEURO/MUSCULOSKELETAL:  Fully alert, oriented, and appropriate. Speech normal kevin. No cranial nerve deficits.   Gait: Antalgic.    TTP over right GTB    Previous " physical exam:  Present trendelenburg sign bilaterally.   Motor Strength: 5/5 motor strength throughout lower extremities.   Sensory: Decreased sensation in bilateral soles of her feet equally.  Increased sensation in a right L5.   Reflexes:  1+ and symmetric throughout.  Downgoing Babinski's bilaterally.  No clonus or spasticity.  L-Spine:  Decreased ROM with pain on flexion. Minimal pain with axial/facet loading bilaterally.  Positive SLR on the left for pain in an S1  SI Joint/Hip: Positive MIKEY on the right.  Negative MIKEY on the left and FADIR bilaterally.  TTP over lumbar paraspinals, right SI joint   No TTP over right lumbar paraspinals, right SI joint.      Imaging:      The imaging studies listed below were independently reviewed by me, and I agree with the findings as documented below.     MRIs have been done today, but the reports are not yet available.  Pre for review of the MRI of the lumbar spine shows posterior instrumented spinal fusion at L4/5 with some residual degenerative disc disease at that level with adjacent endplate edema at L4 and L5. Otherwise, the MRI looks largely unremarkable.      Assessment:     Encounter Diagnoses   Name Primary?    Chronic pain disorder Yes    Postlaminectomy syndrome of lumbar region     DDD (degenerative disc disease), lumbar     Lumbosacral spondylolysis     History of lumbar spinal fusion     Greater trochanteric bursitis of right hip     Chronic anticoagulation     Opioid contract exists     Sacroiliac joint pain        Plan:     Alessia was seen today for follow-up.    Diagnoses and all orders for this visit:    Chronic pain disorder  -     Ambulatory consult to Psychology  -     HYDROcodone-acetaminophen (NORCO) 7.5-325 mg per tablet; Take 1 tablet by mouth 2 (two) times daily as needed for Pain.    Postlaminectomy syndrome of lumbar region  -     Ambulatory consult to Psychology    DDD (degenerative disc disease), lumbar  -      HYDROcodone-acetaminophen (NORCO) 7.5-325 mg per tablet; Take 1 tablet by mouth 2 (two) times daily as needed for Pain.    Lumbosacral spondylolysis  -     HYDROcodone-acetaminophen (NORCO) 7.5-325 mg per tablet; Take 1 tablet by mouth 2 (two) times daily as needed for Pain.    History of lumbar spinal fusion  -     HYDROcodone-acetaminophen (NORCO) 7.5-325 mg per tablet; Take 1 tablet by mouth 2 (two) times daily as needed for Pain.    Greater trochanteric bursitis of right hip  -     lidocaine HCL 10 mg/ml (1%) injection 3 mL  -     bupivacaine (PF) 0.5% (5 mg/mL) injection 45 mg  -     betamethasone acetate-betamethasone sodium phosphate injection 6 mg    Chronic anticoagulation    Opioid contract exists    Sacroiliac joint pain  -     HYDROcodone-acetaminophen (NORCO) 7.5-325 mg per tablet; Take 1 tablet by mouth 2 (two) times daily as needed for Pain.         She presents today with a longstanding chronic pain history with several recent medical issues that are complicating the picture.  We will work to control her pain with as few sedating medications as possible.  It is reasonable to think that these may have had something to do with her falls given her symptoms of amnesia surrounding the event, lightheadedness, and confusion.      We discussed the assessment and recommendations.  All available images were reviewed. We discussed the disease process, prognosis, treatment plan, and risks and benefits. The patient is aware of the risks and benefits of the medications being prescribed, common side effects, and proper usage. The following is the plan we agreed on:     1. Bone scan reviewed with her today.  It does not show any uptake at that L5 level on the right.  2. We had an extensive discussion regarding our options moving forward.  We discussed spinal cord stimulation.  She is very interested in this.  3. Schedule for spinal cord stimulator trial with [facility]. The procedure was explained in detail,  including risks, benefits, and alternatives.  All questions answered.  Consent obtained today.  4. Right GT bursa injection today as below.  5. Can repeat right L5-S3 RFA PRN  6. Continue the Norco 7.5/325 mg twice daily as needed.  Currently, the benefits outweigh the risks.  We had a long discussion regarding the risks and benefits of narcotic therapy.  We will work to minimize these as much as possible.  [facility] reviewed and is appropriate.  Opioid contract signed today.  UDS done 06/25/2019:  Consistent  7. We had an extensive discussion regarding the expected time course for PT for chronic pain vs after a surgery.  Specifically, we discussed that PT for chronic pain almost always causes a worsening of pain before any improvement, which is generally not seen for 3-6 months, in which time she will likely already be home doing home exercises given that a formal course of PT generally lasts anywhere from 6-12 weeks.  8. Recommend she obtain a foam roller for home use for her bursitis  9. Recommend that she obtain a TENS unit for home use  10. She would be a good candidate for FRP in the future  11. RTC for above or sooner if needed.    Right Greater Trochanteric Bursa Injection-    All medications, allergies, and relevant histories were reviewed. No recent antibiotics or infections.  A time-out was taken to verify the correct patient, procedure, laterality, and appropriate medications/allergies.    After informed consent was taken patient is placed in left lateral decubitus position and the right hip area exposed. Patient was prepped in sterile fashion and then using a 27-gauge 1.5 inch needle local anesthetic was injected to make a skin wheal. Then a 22-gauge 3.5 inch spinal needle was introduced into the greater trochanter. After negative aspiration, and no paresthesia, a 10 ml solution containing 0.5% bupivacaine and 6 mg betamethasone was intermittently injected. Patient tolerated procedure well with no  complications.    Sneha Aragon MD  08/20/2019     The above plan and management options were discussed at length with patient. Patient is in agreement with the above and verbalized understanding. It will be communicated with the referring physician via electronic record, fax, or mail.

## 2019-08-21 RX ORDER — BUPIVACAINE HYDROCHLORIDE 5 MG/ML
9 INJECTION, SOLUTION EPIDURAL; INTRACAUDAL
Status: COMPLETED | OUTPATIENT
Start: 2019-08-21 | End: 2019-08-20

## 2019-08-21 RX ORDER — LIDOCAINE HYDROCHLORIDE 10 MG/ML
3 INJECTION INFILTRATION; PERINEURAL
Status: COMPLETED | OUTPATIENT
Start: 2019-08-21 | End: 2019-08-20

## 2019-08-21 RX ORDER — BETAMETHASONE SODIUM PHOSPHATE AND BETAMETHASONE ACETATE 3; 3 MG/ML; MG/ML
6 INJECTION, SUSPENSION INTRA-ARTICULAR; INTRALESIONAL; INTRAMUSCULAR; SOFT TISSUE
Status: COMPLETED | OUTPATIENT
Start: 2019-08-21 | End: 2019-08-20

## 2019-08-22 ENCOUNTER — OFFICE VISIT (OUTPATIENT)
Dept: GASTROENTEROLOGY | Facility: CLINIC | Age: 64
End: 2019-08-22
Payer: MEDICARE

## 2019-08-22 ENCOUNTER — PATIENT OUTREACH (OUTPATIENT)
Dept: ADMINISTRATIVE | Facility: HOSPITAL | Age: 64
End: 2019-08-22

## 2019-08-22 ENCOUNTER — TELEPHONE (OUTPATIENT)
Dept: ENDOSCOPY | Facility: HOSPITAL | Age: 64
End: 2019-08-22

## 2019-08-22 VITALS
HEART RATE: 48 BPM | HEIGHT: 67 IN | WEIGHT: 187.38 LBS | DIASTOLIC BLOOD PRESSURE: 72 MMHG | SYSTOLIC BLOOD PRESSURE: 152 MMHG | RESPIRATION RATE: 16 BRPM | BODY MASS INDEX: 29.41 KG/M2

## 2019-08-22 DIAGNOSIS — K86.2 CYST AND PSEUDOCYST OF PANCREAS: Primary | ICD-10-CM

## 2019-08-22 DIAGNOSIS — K86.3 CYST AND PSEUDOCYST OF PANCREAS: Primary | ICD-10-CM

## 2019-08-22 DIAGNOSIS — K83.9 BILE DUCT ABNORMALITY: Primary | ICD-10-CM

## 2019-08-22 PROCEDURE — 3078F DIAST BP <80 MM HG: CPT | Mod: CPTII,S$GLB,, | Performed by: INTERNAL MEDICINE

## 2019-08-22 PROCEDURE — 99999 PR PBB SHADOW E&M-EST. PATIENT-LVL III: ICD-10-PCS | Mod: PBBFAC,,, | Performed by: INTERNAL MEDICINE

## 2019-08-22 PROCEDURE — 3008F BODY MASS INDEX DOCD: CPT | Mod: CPTII,S$GLB,, | Performed by: INTERNAL MEDICINE

## 2019-08-22 PROCEDURE — 3078F PR MOST RECENT DIASTOLIC BLOOD PRESSURE < 80 MM HG: ICD-10-PCS | Mod: CPTII,S$GLB,, | Performed by: INTERNAL MEDICINE

## 2019-08-22 PROCEDURE — 3077F PR MOST RECENT SYSTOLIC BLOOD PRESSURE >= 140 MM HG: ICD-10-PCS | Mod: CPTII,S$GLB,, | Performed by: INTERNAL MEDICINE

## 2019-08-22 PROCEDURE — 99999 PR PBB SHADOW E&M-EST. PATIENT-LVL III: CPT | Mod: PBBFAC,,, | Performed by: INTERNAL MEDICINE

## 2019-08-22 PROCEDURE — 3077F SYST BP >= 140 MM HG: CPT | Mod: CPTII,S$GLB,, | Performed by: INTERNAL MEDICINE

## 2019-08-22 PROCEDURE — 3008F PR BODY MASS INDEX (BMI) DOCUMENTED: ICD-10-PCS | Mod: CPTII,S$GLB,, | Performed by: INTERNAL MEDICINE

## 2019-08-22 PROCEDURE — 99213 PR OFFICE/OUTPT VISIT, EST, LEVL III, 20-29 MIN: ICD-10-PCS | Mod: S$GLB,,, | Performed by: INTERNAL MEDICINE

## 2019-08-22 PROCEDURE — 99213 OFFICE O/P EST LOW 20 MIN: CPT | Mod: S$GLB,,, | Performed by: INTERNAL MEDICINE

## 2019-08-22 NOTE — TELEPHONE ENCOUNTER
----- Message from Rafita Cochran MD sent at 8/22/2019  1:25 PM CDT -----  Please arrange for endoscopic ultrasound.  Can be a 60 min case.  [place] [facility] [place]

## 2019-08-22 NOTE — PROGRESS NOTES
"  Advanced Endoscopy / Pancreaticobiliary Service    Reason for visit (Chief Complaint): abnl MRI    Referring provider/PCP: Manpreet Fraga MD  [address] [place] [address]    History of Present Illness: Patient presents for follow-up of possible pancreatitis and an abnormality of the biliary tree.  Patient has chronic abdominal pain and is followed by pain management.  On a prior CT from April of 2019 there is some mention of "Possible subtle peripancreatic inflammation-mild pancreatitis cannot be excluded".  However a corresponding lipase level on that same date was elevated to only 2 times upper limit of normal. An MRI and MRCP performed in May of 2019 showed some narrowing in the distal common hepatic duct.  She is sent to me for further evaluation of both of these conditions.    She denies jaundice and recent LFTs are normal. She denies weight loss.           Physical Exam:  General: Well-developed, well-appearing, no acute distress  Neuro: alert and oriented to person, place, time; normal appearing gait  Eyes: No scleral icterus  CVS: regular  Abdomen: soft, mild diffuse tenderness, no rebound tenderness or guarding      Laboratory:   Nl LFTs    Imaging:  Reviewed images from CT and MRI available in PACS    Assessment/Plan:  Abnl Biliary tree on MRI  Possible pancreatitis    I do not think it is wise to pursue ERCP directly given her normal LFTs.  I suspect the MRI findings of a possible bile duct narrowing is a miss-call.  However I think it is very reasonable to get a better look using endoscopic ultrasound.  Patient is in agreement.  We will plan on endoscopic ultrasound to further evaluate the MRI abnormality as well as to evaluate her pancreas.      Total visit time was 15 minutes, more than 50% of which was spent in face-to-face counseling with patient regarding the evaluation, management, and treatment options for abnl MRI, and for coordinating care.      Rafita Cochran MD, " RAUL   - Department of Gastroenterology  [facility] [place]

## 2019-08-22 NOTE — LETTER
August 22, 2019      Manpreet Fraga MD  [address] [facility] [place] [address]           [facility]  [address]  [facility] [address]  Phone: [phone]  Fax: [phone]          Patient: Alessia Nelson   MR Number: [id number]   YOB: 1955   Date of Visit: 8/22/2019       Dear Dr. Manpreet Fraga:    Thank you for referring Alessia Nelson to me for evaluation. Attached you will find relevant portions of my assessment and plan of care.    If you have questions, please do not hesitate to call me. I look forward to following Alessia Nelson along with you.    Sincerely,    Rafita Cochran MD    Enclosure  CC:  No Recipients    If you would like to receive this communication electronically, please contact [email or web address] or [phone] to request more information on [employer] access.    For providers and/or their staff who would like to refer a patient to [facility], please contact us through our one-stop-shop provider referral line, [facility], at [phone].    If you feel you have received this communication in error or would no longer like to receive these types of communications, please e-mail [email or web address]

## 2019-08-23 DIAGNOSIS — G89.4 CHRONIC PAIN DISORDER: ICD-10-CM

## 2019-08-23 DIAGNOSIS — M96.1 POSTLAMINECTOMY SYNDROME OF LUMBAR REGION: Primary | ICD-10-CM

## 2019-08-26 ENCOUNTER — PATIENT OUTREACH (OUTPATIENT)
Dept: ADMINISTRATIVE | Facility: HOSPITAL | Age: 64
End: 2019-08-26

## 2019-08-27 ENCOUNTER — OFFICE VISIT (OUTPATIENT)
Dept: INTERNAL MEDICINE | Facility: CLINIC | Age: 64
End: 2019-08-27
Payer: MEDICARE

## 2019-08-27 VITALS
TEMPERATURE: 98 F | OXYGEN SATURATION: 96 % | RESPIRATION RATE: 19 BRPM | WEIGHT: 187 LBS | DIASTOLIC BLOOD PRESSURE: 52 MMHG | SYSTOLIC BLOOD PRESSURE: 92 MMHG | HEART RATE: 62 BPM | HEIGHT: 67 IN | BODY MASS INDEX: 29.35 KG/M2

## 2019-08-27 DIAGNOSIS — R10.31 BILATERAL LOWER ABDOMINAL CRAMPING: ICD-10-CM

## 2019-08-27 DIAGNOSIS — Z12.2 ENCOUNTER FOR SCREENING FOR LUNG CANCER: ICD-10-CM

## 2019-08-27 DIAGNOSIS — M47.817 SPONDYLOSIS OF LUMBOSACRAL REGION WITHOUT MYELOPATHY OR RADICULOPATHY: ICD-10-CM

## 2019-08-27 DIAGNOSIS — Z12.9 SCREENING FOR CANCER: ICD-10-CM

## 2019-08-27 DIAGNOSIS — R35.0 URINARY FREQUENCY: Primary | ICD-10-CM

## 2019-08-27 DIAGNOSIS — F41.9 ANXIETY: ICD-10-CM

## 2019-08-27 DIAGNOSIS — R10.9 ABDOMINAL PRESSURE: ICD-10-CM

## 2019-08-27 DIAGNOSIS — R10.32 BILATERAL LOWER ABDOMINAL CRAMPING: ICD-10-CM

## 2019-08-27 DIAGNOSIS — Z87.891 PERSONAL HISTORY OF NICOTINE DEPENDENCE: ICD-10-CM

## 2019-08-27 DIAGNOSIS — Z87.39 HISTORY OF TORTICOLLIS: ICD-10-CM

## 2019-08-27 DIAGNOSIS — I77.811 ABDOMINAL AORTIC ECTASIA: ICD-10-CM

## 2019-08-27 LAB
BACTERIA #/AREA URNS HPF: ABNORMAL /HPF
BILIRUB UR QL STRIP: NEGATIVE
CLARITY UR: CLEAR
COLOR UR: YELLOW
GLUCOSE UR QL STRIP: NEGATIVE
HGB UR QL STRIP: NEGATIVE
KETONES UR QL STRIP: NEGATIVE
LEUKOCYTE ESTERASE UR QL STRIP: ABNORMAL
MICROSCOPIC COMMENT: ABNORMAL
NITRITE UR QL STRIP: NEGATIVE
PH UR STRIP: 5 [PH] (ref 5–8)
PROT UR QL STRIP: NEGATIVE
RBC #/AREA URNS HPF: 2 /HPF (ref 0–4)
SP GR UR STRIP: 1.02 (ref 1–1.03)
SQUAMOUS #/AREA URNS HPF: 4 /HPF
URN SPEC COLLECT METH UR: ABNORMAL
UROBILINOGEN UR STRIP-ACNC: NEGATIVE EU/DL
WBC #/AREA URNS HPF: 8 /HPF (ref 0–5)

## 2019-08-27 PROCEDURE — 99999 PR PBB SHADOW E&M-EST. PATIENT-LVL IV: CPT | Mod: PBBFAC,,, | Performed by: NURSE PRACTITIONER

## 2019-08-27 PROCEDURE — 99999 PR PBB SHADOW E&M-EST. PATIENT-LVL IV: ICD-10-PCS | Mod: PBBFAC,,, | Performed by: NURSE PRACTITIONER

## 2019-08-27 PROCEDURE — 99214 PR OFFICE/OUTPT VISIT, EST, LEVL IV, 30-39 MIN: ICD-10-PCS | Mod: S$GLB,,, | Performed by: NURSE PRACTITIONER

## 2019-08-27 PROCEDURE — 81000 URINALYSIS NONAUTO W/SCOPE: CPT

## 2019-08-27 PROCEDURE — 3078F PR MOST RECENT DIASTOLIC BLOOD PRESSURE < 80 MM HG: ICD-10-PCS | Mod: CPTII,S$GLB,, | Performed by: NURSE PRACTITIONER

## 2019-08-27 PROCEDURE — 3078F DIAST BP <80 MM HG: CPT | Mod: CPTII,S$GLB,, | Performed by: NURSE PRACTITIONER

## 2019-08-27 PROCEDURE — 3074F SYST BP LT 130 MM HG: CPT | Mod: CPTII,S$GLB,, | Performed by: NURSE PRACTITIONER

## 2019-08-27 PROCEDURE — 3008F BODY MASS INDEX DOCD: CPT | Mod: CPTII,S$GLB,, | Performed by: NURSE PRACTITIONER

## 2019-08-27 PROCEDURE — 3074F PR MOST RECENT SYSTOLIC BLOOD PRESSURE < 130 MM HG: ICD-10-PCS | Mod: CPTII,S$GLB,, | Performed by: NURSE PRACTITIONER

## 2019-08-27 PROCEDURE — 99214 OFFICE O/P EST MOD 30 MIN: CPT | Mod: S$GLB,,, | Performed by: NURSE PRACTITIONER

## 2019-08-27 PROCEDURE — 3008F PR BODY MASS INDEX (BMI) DOCUMENTED: ICD-10-PCS | Mod: CPTII,S$GLB,, | Performed by: NURSE PRACTITIONER

## 2019-08-27 RX ORDER — BACLOFEN 10 MG/1
10 TABLET ORAL 2 TIMES DAILY
Qty: 60 TABLET | Refills: 6 | Status: ON HOLD | OUTPATIENT
Start: 2019-08-27 | End: 2019-12-19 | Stop reason: HOSPADM

## 2019-08-27 RX ORDER — CIPROFLOXACIN 500 MG/1
500 TABLET ORAL EVERY 12 HOURS
Qty: 14 TABLET | Refills: 0 | Status: SHIPPED | OUTPATIENT
Start: 2019-08-27 | End: 2019-09-03

## 2019-08-27 NOTE — PROGRESS NOTES
"Subjective:       Patient ID: Alessia Nelson is a 63 y.o. female.    Chief Complaint: Follow-up (ER) and Fatigue    Ms. Alessia Nelson is a 63 year old female who presents to the clinic today for ER follow up regarding abdominal pain. Patient reports lower abdominal cramping and pressure. Reports she feels like "something is wrong."  Labs and XRAY reviewed with patient.Patient also reports that she has been having low blood pressure. Reports she has been taking her current medication regimen. Denies confusion or memory loss. Denies vision changes, cp, sob, n/v/d.      In regards to their hypothyroidism, patient complains of poor energy, but denies skin and hair changes, as well as weight gain. Labs overdo.   Patient reports increased left sided neck tightness, spasms and pain. Difficulty turning head from side to side. Reports stiffness and "knot" in her neck. Continues to see Dr. Aragon for pain management. Hx torticolosis.   Ms. Aggarwal has been a smoker for 46 years and has never had baseline LDCT lung scan. Denies productive cough, cp, or sob.         Review of Systems   Constitutional: Negative for activity change, chills, fatigue, fever and unexpected weight change.   HENT: Negative for congestion, ear pain, postnasal drip, sinus pressure, sneezing, sore throat and tinnitus.    Eyes: Negative for pain, redness and visual disturbance.   Respiratory: Negative for apnea, cough, chest tightness, shortness of breath and wheezing.    Cardiovascular: Negative for chest pain, palpitations and leg swelling.   Gastrointestinal: Positive for abdominal pain. Negative for abdominal distention, blood in stool, constipation, diarrhea, nausea (intermittent) and vomiting.   Endocrine: Negative for polydipsia, polyphagia and polyuria.   Genitourinary: Positive for frequency and urgency. Negative for difficulty urinating, dysuria, flank pain and hematuria.   Musculoskeletal: Positive for arthralgias, back pain and myalgias. " "Negative for joint swelling.        Sees pain management   Skin: Negative for color change, pallor and rash.   Allergic/Immunologic: Negative for environmental allergies, food allergies and immunocompromised state.   Neurological: Negative for dizziness, tremors, syncope, weakness, light-headedness and headaches.   Hematological: Does not bruise/bleed easily.   Psychiatric/Behavioral: Negative for agitation, confusion, decreased concentration, self-injury, sleep disturbance and suicidal ideas. The patient is not nervous/anxious and is not hyperactive.          Reviewed family, medical, surgical, and social history.    Objective:      BP (!) 92/52 (BP Location: Right arm, Patient Position: Sitting, BP Method: Medium (Automatic))   Pulse 62   Temp 98.2 °F (36.8 °C) (Oral)   Resp 19   Ht 5' 7" (1.702 m)   Wt 84.8 kg (187 lb)   SpO2 96%   BMI 29.29 kg/m²   Physical Exam   Constitutional: She is oriented to person, place, and time. She appears well-developed and well-nourished. She is cooperative. No distress.   HENT:   Head: Normocephalic and atraumatic.   Right Ear: Hearing, tympanic membrane, external ear and ear canal normal.   Left Ear: Hearing, tympanic membrane, external ear and ear canal normal.   Nose: Nose normal.   Mouth/Throat: Uvula is midline, oropharynx is clear and moist and mucous membranes are normal. No uvula swelling.   Eyes: Pupils are equal, round, and reactive to light. Conjunctivae, EOM and lids are normal.   Neck: Trachea normal and full passive range of motion without pain. No tracheal tenderness present. No neck rigidity. No thyroid mass present.   Cardiovascular: Normal rate, regular rhythm, S1 normal, S2 normal, normal heart sounds, intact distal pulses and normal pulses.   Pulmonary/Chest: Effort normal and breath sounds normal. No respiratory distress. She has no decreased breath sounds. She has no wheezes.   Abdominal: Soft. Normal appearance and bowel sounds are normal. She " exhibits no distension and no abdominal bruit. There is no tenderness. There is no guarding and no CVA tenderness.   Musculoskeletal: She exhibits no edema, tenderness or deformity.   Lymphadenopathy:     She has no cervical adenopathy.   Neurological: She is alert and oriented to person, place, and time. She has normal strength. She exhibits normal muscle tone.   Skin: Skin is warm, dry and intact. Capillary refill takes less than 2 seconds. No abrasion, no laceration, no lesion and no rash noted. She is not diaphoretic. No cyanosis. Nails show no clubbing.   Psychiatric: She has a normal mood and affect. Her speech is normal and behavior is normal. Judgment and thought content normal. Cognition and memory are normal.       Assessment:       1. Urinary frequency    2. Abdominal pressure    3. Abdominal aortic ectasia    4. Screening for cancer    5. Encounter for screening for lung cancer    6. Personal history of nicotine dependence     7. Bilateral lower abdominal cramping    8. History of torticollis    9. Anxiety    10. Spondylosis of lumbosacral region without myelopathy or radiculopathy        Plan:       Urinary frequency  -     Urinalysis, Reflex to Urine Culture Urine, Clean Catch  -     ciprofloxacin HCl (CIPRO) 500 MG tablet; Take 1 tablet (500 mg total) by mouth every 12 (twelve) hours. for 7 days  Dispense: 14 tablet; Refill: 0    Abdominal pressure  -     Urinalysis, Reflex to Urine Culture Urine, Clean Catch  -     ciprofloxacin HCl (CIPRO) 500 MG tablet; Take 1 tablet (500 mg total) by mouth every 12 (twelve) hours. for 7 days  Dispense: 14 tablet; Refill: 0    Abdominal aortic ectasia  -     US Abdominal Aorta; Future; Expected date: 08/27/2019    Screening for cancer  -     CT Chest Lung Screening Low Dose; Future; Expected date: 08/27/2019    Encounter for screening for lung cancer  -     CT Chest Lung Screening Low Dose; Future; Expected date: 08/27/2019    Personal history of nicotine  dependence   -     CT Chest Lung Screening Low Dose; Future; Expected date: 08/27/2019    Bilateral lower abdominal cramping  -     Urinalysis, Reflex to Urine Culture Urine, Clean Catch  -     ciprofloxacin HCl (CIPRO) 500 MG tablet; Take 1 tablet (500 mg total) by mouth every 12 (twelve) hours. for 7 days  Dispense: 14 tablet; Refill: 0    History of torticollis  -     Ambulatory Referral to Physical/Occupational Therapy  -     baclofen (LIORESAL) 10 MG tablet; Take 1 tablet (10 mg total) by mouth 2 (two) times daily.  Dispense: 60 tablet; Refill: 6    Anxiety    Spondylosis of lumbosacral region without myelopathy or radiculopathy  -     Ambulatory Referral to Physical/Occupational Therapy  -     baclofen (LIORESAL) 10 MG tablet; Take 1 tablet (10 mg total) by mouth 2 (two) times daily.  Dispense: 60 tablet; Refill: 6          PLAN:  - Discussed with patient the plan of care  - Refer to PT/OT  - Increase hydration  - Medications reviewed. Medication side effects discussed. Patient has no questions or concerns at this time. Informed patient to notify me regarding any concerns.   - Continue monitoring BP; stop amlodipine- benazepril while hypotension persists    - Informed patient to please notify me with any questions or concerns at anytime  - Follow up ordered for 4 weeks        Risks, benefits, and side effects were discussed with the patient. All questions were answered to the fullest satisfaction of the patient, and pt verbalized understanding and agreement to treatment plan. Pt was to call with any new or worsening symptoms, or present to the ER.

## 2019-08-29 ENCOUNTER — TELEPHONE (OUTPATIENT)
Dept: ENDOSCOPY | Facility: HOSPITAL | Age: 64
End: 2019-08-29

## 2019-08-29 ENCOUNTER — PATIENT OUTREACH (OUTPATIENT)
Dept: ADMINISTRATIVE | Facility: HOSPITAL | Age: 64
End: 2019-08-29

## 2019-09-04 ENCOUNTER — TELEPHONE (OUTPATIENT)
Dept: ORTHOPEDICS | Facility: CLINIC | Age: 64
End: 2019-09-04

## 2019-09-04 ENCOUNTER — OFFICE VISIT (OUTPATIENT)
Dept: ORTHOPEDICS | Facility: CLINIC | Age: 64
End: 2019-09-04
Payer: MEDICARE

## 2019-09-04 VITALS
WEIGHT: 186.94 LBS | HEIGHT: 67 IN | DIASTOLIC BLOOD PRESSURE: 68 MMHG | SYSTOLIC BLOOD PRESSURE: 126 MMHG | RESPIRATION RATE: 17 BRPM | HEART RATE: 56 BPM | BODY MASS INDEX: 29.34 KG/M2

## 2019-09-04 DIAGNOSIS — M25.561 RIGHT KNEE PAIN, UNSPECIFIED CHRONICITY: ICD-10-CM

## 2019-09-04 DIAGNOSIS — M17.11 PRIMARY OSTEOARTHRITIS OF RIGHT KNEE: ICD-10-CM

## 2019-09-04 DIAGNOSIS — M23.203 DEGENERATIVE TEAR OF MEDIAL MENISCUS OF RIGHT KNEE: ICD-10-CM

## 2019-09-04 DIAGNOSIS — M23.51 CHRONIC INSTABILITY OF KNEE, RIGHT KNEE: Primary | ICD-10-CM

## 2019-09-04 DIAGNOSIS — M71.21 BAKER'S CYST OF KNEE, RIGHT: ICD-10-CM

## 2019-09-04 PROCEDURE — 3074F SYST BP LT 130 MM HG: CPT | Mod: CPTII,S$GLB,, | Performed by: ORTHOPAEDIC SURGERY

## 2019-09-04 PROCEDURE — 3078F PR MOST RECENT DIASTOLIC BLOOD PRESSURE < 80 MM HG: ICD-10-PCS | Mod: CPTII,S$GLB,, | Performed by: ORTHOPAEDIC SURGERY

## 2019-09-04 PROCEDURE — 3008F PR BODY MASS INDEX (BMI) DOCUMENTED: ICD-10-PCS | Mod: CPTII,S$GLB,, | Performed by: ORTHOPAEDIC SURGERY

## 2019-09-04 PROCEDURE — 99999 PR PBB SHADOW E&M-EST. PATIENT-LVL III: CPT | Mod: PBBFAC,,, | Performed by: ORTHOPAEDIC SURGERY

## 2019-09-04 PROCEDURE — 3008F BODY MASS INDEX DOCD: CPT | Mod: CPTII,S$GLB,, | Performed by: ORTHOPAEDIC SURGERY

## 2019-09-04 PROCEDURE — 99999 PR PBB SHADOW E&M-EST. PATIENT-LVL III: ICD-10-PCS | Mod: PBBFAC,,, | Performed by: ORTHOPAEDIC SURGERY

## 2019-09-04 PROCEDURE — 99204 PR OFFICE/OUTPT VISIT, NEW, LEVL IV, 45-59 MIN: ICD-10-PCS | Mod: 25,S$GLB,, | Performed by: ORTHOPAEDIC SURGERY

## 2019-09-04 PROCEDURE — 3078F DIAST BP <80 MM HG: CPT | Mod: CPTII,S$GLB,, | Performed by: ORTHOPAEDIC SURGERY

## 2019-09-04 PROCEDURE — 3074F PR MOST RECENT SYSTOLIC BLOOD PRESSURE < 130 MM HG: ICD-10-PCS | Mod: CPTII,S$GLB,, | Performed by: ORTHOPAEDIC SURGERY

## 2019-09-04 PROCEDURE — 99204 OFFICE O/P NEW MOD 45 MIN: CPT | Mod: 25,S$GLB,, | Performed by: ORTHOPAEDIC SURGERY

## 2019-09-04 NOTE — LETTER
September 5, 2019      Cheryl Bradley, NP  [address]  [place] [address]           [facility]  [address], [address]  [place] [address]  Phone: [phone]  Fax: [phone]          Patient: Alessia Nelson   MR Number: [id number]   YOB: 1955   Date of Visit: 9/4/2019       Dear Cheryl Bradley:    Thank you for referring Alessia Nelson to me for evaluation. Attached you will find relevant portions of my assessment and plan of care.    If you have questions, please do not hesitate to call me. I look forward to following Alessia Nelson along with you.    Sincerely,    Zak Oleary, DO    Enclosure  CC:  No Recipients    If you would like to receive this communication electronically, please contact [email or web address] or [phone] to request more information on [facility] access.    For providers and/or their staff who would like to refer a patient to [facility], please contact us through our one-stop-shop provider referral line, [facility], at [phone].    If you feel you have received this communication in error or would no longer like to receive these types of communications, please e-mail [email or web address]

## 2019-09-04 NOTE — TELEPHONE ENCOUNTER
"She was not happy with [facility] as a whole when she began her visit with us.   She arrived at 9:57 for her 10 appointment. We pulled her back at 10:40, not ideal I know.   As soon as she sat down, she started complaining that she originally went to the ED on 8/13/19 for abdominal pain and they did a x-ray of her right knee after she requested one. She stated no one told her anything about any tests that were ran that day. However, she knew she had some calcification and a bone spur in her cartilage because she read the report herself but no one told her anything. She then states that she found out she had a kidney stone by reading her abdominal x-ray. Again, no one in the joke of an ED told her what was going on with her. They x-rayed her and send her on her way.    She then requested I look at her labs that Savanah took and let her know what the outcomes were. I informed her that I could send a message to Syed Bradley's office and have them call her for the results. She wanted a print out due to her going anywhere out of state to get away from all these horrible doctors and these terrible workers at [facility]. She stated she was originally from [place].   I then, sat in the room and typed up this message,     " ANTONIO Reynaga Staff  Phone Number: [phone]  The patient is currently in our office and is requesting a copy of her lab results. She states no one has informed her of the results and she is trying to go to another ED out of the [facility] system due to not receiving the care she deserves. She states she knows she has a kidney stone but no one has done anything to assist her in getting better. "    With the patient' approval I sent it.     We continued the rooming process all the while she is demeaning [facility] and every doctor she has been to. She then asks if "this" doctor cares about his patients. I inform her he does, however, he has a lot of questions to go through since you are " "a new patient. She then informs me that she was a  for 20 years and she did a stint in a teen facility. So, if he doesn't cooperate she will tell him to drop and give her 50. I laughed and walked out telling her he would be in shortly.     We had two injections back here so we were getting them set up. Dr. Oleary had already grabbed her paper and was going over it all and the chart to see what was going on and putting information on the back of her sheet so he would make sure to hit the highlights.   She comes out of her exam room and informs us that "I am not happy! Y'all have been seeing patient's before me that had an 11 o'clock appointment"   Leena and I checked the appointment desk and politely informed her that the patients' we were injecting have been here since 9:06. She turned around and went in her room.   Dr. Oleary immediately followed her in to start the exam. He was in the room for over 45 minutes addressing her current concern of right knee pain and going over her past and current medical history; as well as surgical and family. He came out and asked Leena for a 2, 2, and 1. Leena went and gathered the shot and brought it into the exam room and set up for the injection. Then she closed the door and told the patient it would be a few minutes.   Dr. Oleary saw one other patient and I was rooming patients. She storms out of the office and tells Iggy that were taking patient's before her and she's been here since 10 and that was her appointment Laurence Parkinson comes back here to inform us of what was said.     "

## 2019-09-05 ENCOUNTER — TELEPHONE (OUTPATIENT)
Dept: FAMILY MEDICINE | Facility: CLINIC | Age: 64
End: 2019-09-05

## 2019-09-05 DIAGNOSIS — R10.84 GENERALIZED ABDOMINAL PAIN: ICD-10-CM

## 2019-09-05 DIAGNOSIS — R30.0 DYSURIA: Primary | ICD-10-CM

## 2019-09-05 PROBLEM — M23.203 DEGENERATIVE TEAR OF MEDIAL MENISCUS OF RIGHT KNEE: Status: ACTIVE | Noted: 2019-09-05

## 2019-09-05 PROBLEM — M71.21 BAKER'S CYST OF KNEE, RIGHT: Status: ACTIVE | Noted: 2019-09-05

## 2019-09-05 PROBLEM — M17.11 PRIMARY OSTEOARTHRITIS OF RIGHT KNEE: Status: ACTIVE | Noted: 2019-09-05

## 2019-09-05 NOTE — TELEPHONE ENCOUNTER
----- Message from Cheryl Bradley NP sent at 9/5/2019  7:27 AM CDT -----  Regarding: FW: Lab Results  Contact: [phone]  Staff,  Can we call Ms. Aggarwal this AM? We discussed her concerns at her recent visit so this is very concerning to me as she had no questions or concerns regarding her care at that time.   ----- Message -----  From: Cesia Vu LPN  Sent: 9/4/2019   3:44 PM  To: Cheryl Bradley NP  Subject: FW: Lab Results                                  Pt saw Ortho today- please see notes from Ortho staff.  (Lab results were not given to pt at visit)    ----- Message -----  From: Cassia Mtz MA  Sent: 9/4/2019  10:34 AM  To: Mirna Luna Staff  Subject: Lab Results                                      The patient is currently in our office and is requesting a copy of her lab results. She states no one has informed her of the results and she is trying to go to another ED out of the [facility] system due to not receiving the care she deserves. She states she knows she has a kidney stone but no one has done anything to assist her in getting better.

## 2019-09-05 NOTE — TELEPHONE ENCOUNTER
----- Message from Melanie Turner sent at 9/5/2019  9:49 AM CDT -----  Contact: self  Type:  Patient Returning Call    Who Called:  Patient   Who Left Message for Patient:  Nurse   Does the patient know what this is regarding?:   Best Call Back Number:  [phone] (home)     Additional Information:

## 2019-09-05 NOTE — PROGRESS NOTES
Subjective:      Patient ID: Alessia Nelson is a 63 y.o. female.    Chief Complaint: Pain of the Right Knee    Referring Provider: Cheryl Bradley, Np  [address] [place] [address]    HPI:  Ms. Nelson is a 63-year-old lady who presented today for evaluation of 1 month of right knee pain which began after she did a drive to [place].  She stated she has fallen numerous times in the last 5 years.  After her drive her knee was stiff swollen and painful.  She does have a history of a patella dislocation in 1980s which was treated with a lateral release.  Walking and flexing her knee increases her symptoms while the use of narcotic pain meds decrease them. She has not taken NSAIDs because she has a history of stomach ulcers.  She has not worn a brace, done physical therapy nor had injections.    Past Medical History:   Diagnosis Date    Acute pancreatitis     CHF (congestive heart failure)     COPD (chronic obstructive pulmonary disease)     Depression     Diverticulosis     GERD (gastroesophageal reflux disease)     Hypertension      *  *  *  *  *  *  *  *  *  *  *  *  *  *  *  * Thyroid disease  Hyperlipidemia  Seasonal allergies  Anxiety  MRI  TIA  Right eyelid skin cancer  Headaches  Seizures  Ulcers  Hiatal hernia  Gout  Coronary artery disease  Fibromyalgia  Fibroid/dermoid tumors of the uterus  Peripheral neuropathy  Chronic anticoagulation      Past Surgical History:   Procedure Laterality Date    ADRENAL GLAND SURGERY      CHOLECYSTECTOMY      COLONOSCOPY      HERNIA REPAIR abdominal hernia      HYSTERECTOMY      INCISIONAL HERNIA REPAIR      instestine      PARATHYROID GLAND SURGERY      3 surgeries    Radiofrequency Ablation - RIGHT L3-5 RADIOFREQUENCY ABLATION WITH Compliance ControlYARD COOLIEF THERMAL SYSTEM Right 6/10/2019    Performed by [facility] at [facility]    RIGHT L5-S3 MEDIAL BRANCH BLOCKS Right 5/27/2019    Performed by [facility] at [facility] OR      *  *  *  *  *  *  *  *  *  *  *  *  * UPPER GASTROINTESTINAL ENDOSCOPY  LAPAROTOMY WITH BOWEL RESECTION  APPENDECTOMY  T&A  HEMORRHOIDECTOMY  CARDIAC CATHETERIZATION WITH STENT PLACEMENT  COLONOSCOPY  BILATERAL CATARACT REMOVAL  C-SPINE FUSION  LUMBAR FUSION  ARTHROSCOPY RIGHT KNEE  ROTATOR CUFF REPAIR LEFT SHOULDER  BONE REMOVAL LEFT ANKLE  FIBROID/DERMOID TUMOR EXCISION OF THE UTERUS         Review of patient's allergies indicates:   Allergen Reactions    Aspirin     Lortab [hydrocodone-acetaminophen] Itching    Phenergan [promethazine]     Promethazine hcl        Social History     Occupational History    Retired    Tobacco Use    Smoking status: Current Every Day Smoker     Packs/day: 3.00     Years: 15.00     Pack years: 45.00    Smokeless tobacco: Never Used   Substance and Sexual Activity    Alcohol use: No    Drug use: No    Sexual activity: Not Currently      Family History   Problem Relation Age of Onset    Stroke Maternal Grandmother     Cancer Maternal Grandfather     Stroke Mother     Heart disease Father        Previous Hospitalizations:  Laparotomy, TIA    ROS:   Review of Systems   Constitution: Negative for chills and fever.   Eyes: Negative for blurred vision.   Cardiovascular: Negative for chest pain.   Respiratory: Negative for cough.    Endocrine: Negative for polydipsia.   Hematologic/Lymphatic: Negative for adenopathy.   Skin: Negative for flushing and itching.   Musculoskeletal: Positive for gout, joint pain and joint swelling.   Gastrointestinal: Positive for heartburn. Negative for constipation and diarrhea.   Genitourinary: Negative for nocturia.   Neurological: Positive for headaches and seizures.   Psychiatric/Behavioral: Positive for depression. The patient is nervous/anxious.    Allergic/Immunologic: Positive for environmental allergies.           Objective:      Physical Exam:   General: AAOx3.  No acute distress  HEENT: Normocephalic, PEARLA EOMI,  fair dentition  Neck: Supple, No JVD  Chest: Symetric, equal excursion on inspiration  Abdomen: Soft NTND  Vascular:  Pulses intact and equal bilaterally.  Capillary refill less than 3 seconds and equal bilaterally  Neurologic:  Pinprick and soft touch intact and equal bilaterally  Integment:  No ecchymosis, no errythema  Extremity:  Knee:  Extension/flexion equal bilaterally 0/122 degrees. Mild effusion right knee.  Crepitus with motion both knees.  Baker cyst right knee. Negative patellar load/compression both knees.  Negative patella apprehension/relocation both knees.  Varus/valgus stressing equal bilaterally with endpoint.  Lachman's/drawer equal bilaterally with endpoint.  Positive joint line tenderness right knee. Charlene mildly positive right knee. Perry positive right knee.  Nontender at the anserine insertion both knees.  No swelling at the anserine insertion both knees.  Radiography:  Personally reviewed x-rays of the right knee completed on 08/13/2019 which showed tricompartmental arthritic changes with early osteophyte formation and chondrocalcinosis      Assessment:       Impression:      1. Primary osteoarthritis of right knee    2. Degenerative tear of medial meniscus of right knee    3. Baker's cyst of knee, right          Plan:       1.  Discussed physical examination and radiographic findings with the patient. Alessia understands that she has arthritis of her right knee with a degenerative medial meniscus tear she could consider surgical intervention or proceed with conservative management. Since she has not completed conservative management recommend she trial some conservative care to see if she improves.  2.  Offered a steroid injection to her right knee, she elected proceed.  The patient left the office before her injection was given.  3.  Taye TOUSSAINT brace, right knee, a prescription was given to the patient to purchase one.  4.  Would not recommend the patient take NSAIDs as she has  a history of stomach ulcer.  5.  Offered to refer to physical therapy, will hold off for now to see if steroid injections help and bracing.  6.  Recommend the patient do home exercises such as quadriceps and hamstring strengthening.  7.  [facility] portal was discussed with the patient and information was given.  The patient was encouraged to use the portal for future encounters.  8.  Follow up p.r.n..

## 2019-09-05 NOTE — TELEPHONE ENCOUNTER
Pt stated that symptoms have not improved, she is still having severe burning on urination and bladder pain.

## 2019-09-10 ENCOUNTER — TELEPHONE (OUTPATIENT)
Dept: ENDOSCOPY | Facility: HOSPITAL | Age: 64
End: 2019-09-10

## 2019-09-12 ENCOUNTER — TELEPHONE (OUTPATIENT)
Dept: PAIN MEDICINE | Facility: CLINIC | Age: 64
End: 2019-09-12

## 2019-09-12 NOTE — TELEPHONE ENCOUNTER
----- Message from Renea Cornell sent at 9/12/2019  9:43 AM CDT -----  Contact: DAYDAY JACKSON [[id number]]  Celeste:  Please see below. Thanks!  ----- Message -----  From: Parviz Shine  Sent: 9/11/2019   2:55 PM  To: [facility] Schedulers    Name of Who is Calling: DAYDAY JACKSON [[id number]]      What is the request in detail: Would like to cancel upcoming procedure on 9/16. Still not sure if she wants to have it done.       Can the clinic reply by [facility]: no      What Number to Call Back if not in [facility]: [phone]

## 2019-09-12 NOTE — TELEPHONE ENCOUNTER
I'd like to speak to her prior to cancelling.  I called her and left her a message.  If she calls back, please text me.    Thanks!  LW

## 2019-09-23 ENCOUNTER — OFFICE VISIT (OUTPATIENT)
Dept: PAIN MEDICINE | Facility: CLINIC | Age: 64
End: 2019-09-23
Payer: MEDICARE

## 2019-09-23 ENCOUNTER — TELEPHONE (OUTPATIENT)
Dept: ENDOSCOPY | Facility: HOSPITAL | Age: 64
End: 2019-09-23

## 2019-09-23 VITALS
TEMPERATURE: 99 F | DIASTOLIC BLOOD PRESSURE: 93 MMHG | HEART RATE: 59 BPM | WEIGHT: 188.94 LBS | OXYGEN SATURATION: 96 % | HEIGHT: 67 IN | SYSTOLIC BLOOD PRESSURE: 190 MMHG | BODY MASS INDEX: 29.65 KG/M2 | RESPIRATION RATE: 16 BRPM

## 2019-09-23 DIAGNOSIS — M25.551 CHRONIC RIGHT HIP PAIN: ICD-10-CM

## 2019-09-23 DIAGNOSIS — Z79.01 CHRONIC ANTICOAGULATION: ICD-10-CM

## 2019-09-23 DIAGNOSIS — G89.29 CHRONIC RIGHT HIP PAIN: ICD-10-CM

## 2019-09-23 DIAGNOSIS — M43.07 LUMBOSACRAL SPONDYLOLYSIS: ICD-10-CM

## 2019-09-23 DIAGNOSIS — Z79.891 OPIOID CONTRACT EXISTS: ICD-10-CM

## 2019-09-23 DIAGNOSIS — M51.36 DDD (DEGENERATIVE DISC DISEASE), LUMBAR: ICD-10-CM

## 2019-09-23 DIAGNOSIS — M70.61 GREATER TROCHANTERIC BURSITIS OF RIGHT HIP: ICD-10-CM

## 2019-09-23 DIAGNOSIS — G89.4 CHRONIC PAIN DISORDER: Primary | ICD-10-CM

## 2019-09-23 DIAGNOSIS — M96.1 POSTLAMINECTOMY SYNDROME OF LUMBAR REGION: ICD-10-CM

## 2019-09-23 PROCEDURE — 3077F PR MOST RECENT SYSTOLIC BLOOD PRESSURE >= 140 MM HG: ICD-10-PCS | Mod: CPTII,S$GLB,, | Performed by: ANESTHESIOLOGY

## 2019-09-23 PROCEDURE — 3080F DIAST BP >= 90 MM HG: CPT | Mod: CPTII,S$GLB,, | Performed by: ANESTHESIOLOGY

## 2019-09-23 PROCEDURE — 20611 DRAIN/INJ JOINT/BURSA W/US: CPT | Mod: RT,S$GLB,, | Performed by: ANESTHESIOLOGY

## 2019-09-23 PROCEDURE — 99214 OFFICE O/P EST MOD 30 MIN: CPT | Mod: 25,S$GLB,, | Performed by: ANESTHESIOLOGY

## 2019-09-23 PROCEDURE — 20611 PR DRAIN/ASP/INJECT MAJOR JOINT/BURSA W/US GUIDANCE: ICD-10-PCS | Mod: RT,S$GLB,, | Performed by: ANESTHESIOLOGY

## 2019-09-23 PROCEDURE — 99999 PR PBB SHADOW E&M-EST. PATIENT-LVL III: ICD-10-PCS | Mod: PBBFAC,,, | Performed by: ANESTHESIOLOGY

## 2019-09-23 PROCEDURE — 3008F BODY MASS INDEX DOCD: CPT | Mod: CPTII,S$GLB,, | Performed by: ANESTHESIOLOGY

## 2019-09-23 PROCEDURE — 99214 PR OFFICE/OUTPT VISIT, EST, LEVL IV, 30-39 MIN: ICD-10-PCS | Mod: 25,S$GLB,, | Performed by: ANESTHESIOLOGY

## 2019-09-23 PROCEDURE — 3008F PR BODY MASS INDEX (BMI) DOCUMENTED: ICD-10-PCS | Mod: CPTII,S$GLB,, | Performed by: ANESTHESIOLOGY

## 2019-09-23 PROCEDURE — 3080F PR MOST RECENT DIASTOLIC BLOOD PRESSURE >= 90 MM HG: ICD-10-PCS | Mod: CPTII,S$GLB,, | Performed by: ANESTHESIOLOGY

## 2019-09-23 PROCEDURE — 99999 PR PBB SHADOW E&M-EST. PATIENT-LVL III: CPT | Mod: PBBFAC,,, | Performed by: ANESTHESIOLOGY

## 2019-09-23 PROCEDURE — 3077F SYST BP >= 140 MM HG: CPT | Mod: CPTII,S$GLB,, | Performed by: ANESTHESIOLOGY

## 2019-09-23 RX ORDER — BETAMETHASONE SODIUM PHOSPHATE AND BETAMETHASONE ACETATE 3; 3 MG/ML; MG/ML
6 INJECTION, SUSPENSION INTRA-ARTICULAR; INTRALESIONAL; INTRAMUSCULAR; SOFT TISSUE
Status: COMPLETED | OUTPATIENT
Start: 2019-09-23 | End: 2019-09-23

## 2019-09-23 RX ORDER — BUPIVACAINE HYDROCHLORIDE 2.5 MG/ML
6 INJECTION, SOLUTION EPIDURAL; INFILTRATION; INTRACAUDAL
Status: COMPLETED | OUTPATIENT
Start: 2019-09-23 | End: 2019-09-27

## 2019-09-23 RX ORDER — LIDOCAINE HYDROCHLORIDE 10 MG/ML
3 INJECTION INFILTRATION; PERINEURAL
Status: COMPLETED | OUTPATIENT
Start: 2019-09-23 | End: 2019-09-23

## 2019-09-23 RX ORDER — HYDROCODONE BITARTRATE AND ACETAMINOPHEN 7.5; 325 MG/1; MG/1
1 TABLET ORAL 2 TIMES DAILY PRN
Qty: 60 TABLET | Refills: 0 | Status: SHIPPED | OUTPATIENT
Start: 2019-09-26 | End: 2019-10-26

## 2019-09-23 RX ADMIN — LIDOCAINE HYDROCHLORIDE 3 ML: 10 INJECTION INFILTRATION; PERINEURAL at 06:09

## 2019-09-23 RX ADMIN — BETAMETHASONE SODIUM PHOSPHATE AND BETAMETHASONE ACETATE 6 MG: 3; 3 INJECTION, SUSPENSION INTRA-ARTICULAR; INTRALESIONAL; INTRAMUSCULAR; SOFT TISSUE at 06:09

## 2019-09-23 NOTE — LETTER
September 27, 2019      Cheryl Bradley, NP  [address]  [place] [address]           [facility]  [address] [place] [address]  Phone: [phone]  Fax: [phone]          Patient: Alessia Nelson   MR Number: [id number]   YOB: 1955   Date of Visit: 9/23/2019       Dear Cheryl Bradley:    Thank you for referring Alessia Nelson to me for evaluation. Attached you will find relevant portions of my assessment and plan of care.    If you have questions, please do not hesitate to call me. I look forward to following Alessia Nelson along with you.    Sincerely,    Sneha Aragon MD    Enclosure  CC:  No Recipients    If you would like to receive this communication electronically, please contact [email or web address] or [phone] to request more information on [facility] access.    For providers and/or their staff who would like to refer a patient to [facility], please contact us through our one-stop-shop provider referral line, [facility], at [phone].    If you feel you have received this communication in error or would no longer like to receive these types of communications, please e-mail [email or web address]

## 2019-09-23 NOTE — PROGRESS NOTES
Subjective:     Patient ID: Alessia Nelson is a 63 y.o. female.    Chief Complaint: Pain    Consulted by: Lynne Parsons NP     Disclaimer: This note was generated using voice recognition software.  There may be a typographical errors that were missed during proofreading.      HPI:    Alessia Nelson is a 63 y.o. female who presents today with chronic low back pain.  She has a history of 2 lumbar surgeries in the past as well as a cervical surgery.  She reports bilateral low back pain that radiates into her right buttock and the posterior aspect of her right leg to her knee and sometimes to her calf.  She does have numbness in bilateral feet attributed to diabetic peripheral neuropathy. This is separate from her low back symptoms.   This pain is described in detail below.    Of note, she has had 4 falls, one from her BP dropping, one while she was asleep, and two others of unknown causes.    She carries a diagnosis of fibromyalgia, but she states that she does not believe she has this.    Aggravating factors:  Sitting, standing, walking, bending/twisting, lifting, exercise    Mitigating factors:  Rest, lying down, medication    Previously seeing: Dr. Cardozo, Kyler Watson PA-C, Dr. Brent Lewis    Interval History (6/25/2019):  She returns today for follow up.  She reports that she has not gotten any relief from the radiofrequency ablation yet.  Nothing has been helpful for the pain.    Outside records from [facility]:  Office visit from 04/18/2019:  Show ongoing treatment for her low back pain. Makes a note of a recent SI joint injection with no benefit.  Makes note of an EMG which shows neuropathy.  Shows ongoing treatment with Norco 7.5/325 mg twice daily as needed with no issues.  Other office visits from 02/26/2019, 12/20/2018, 10/25/2018 all show stable treatment with Norco 7.5/325 mg twice daily as needed with no issues.  One office visit from 11/2014 show treatment with Norco 10/325 mg  with no issues    Interval History (7/22/2019):  She returns today for follow up.  She reports that the right GT bursa injection provided greater than 50% benefit at 1st, lasting until this week.  Norco has been helpful for the pain, but methocarbamol 2 tablets at nighttime has not been as helpful.  She has not been able to get the foam roller that we discussed at our last visit.  She wonders if a TENS unit can be helpful.  She saw Dr. Yuan, who ordered a bone scan to look at a possible fracture at L5 near the L5/S1 facet joint.  She has not had this done yet.    Of note, she continues to have difficulty sleeping.  She admits to using her tablet in the bed.  She reports that she was sleeping better before the amitriptyline and lorazepam were stopped; however, she does not wish to restart the that she has not had any falls since stopping these.    Interval History (8/20/2019):  She returns today for follow up.  She reports that she is having worsening stomach pain. This is actually her primary pain complaint today.  This is being treated up by Dr. Fraga.  Norco has been helpful for her low back and knee pain. She continues to take gabapentin and methocarbamol as well with benefit.  Despite this, she continues to have severe pain that is limiting her functioning.  She asks about the report from her bone scan.    Of note, her right bursa is hurting today.  She asks for repeat injection. This was very helpful in June    Interval History (9/23/2019):  She returns today for follow up.  She reports that she canceled the spinal cord stimulator trial because she is nervous about injection on her hardware.  She reports that her right leg/hip continue to be the most painful placed today.  Overall, she reports that nothing helps although, the Norco takes the edge off.    Physical Therapy:  Yes, just finished 2 weeks, she is doing the HEP (stretching) most days.      Non-pharmacologic Treatment:     · Ice/Heat: Heat is  "helpful (her dog lies on her back)  · TENS: Tried at the chiropractor  · Massage: Yes, in the past, helpful  · Chiropractic care:  Yes, in the past, helpful  · Acupuncture: She is interested in trying this.  · Other: Uses a body pillow for sleep         Pain Medications:         · Currently taking: Tylenol 1733-9913 BID PRN (takes it for before the Norco), Norco 7.5/325 mg BID PRN (she tries to limit this as much as possible), methocarbamol 750-1500 mg QHS, gabapentin 400 mg BID, trazodone 100 mg QHS, Wellbutrin, Lexapro    · Has tried in the past:    · Opioids: Oxycodone (doesn't like)  · NSAIDS: Celebrex (caused diarrhea)  · Tylenol: Tylenol 6698-5532   · Muscle relaxants: tizanidine (caused SE of not remembering and seeing things)  · TCAs: Amitriptyline (stopped due to falls)  · SNRIs: Not tried  · Anticonvulsants: On gabapentin  · topical creams: Lotion for nighttime, bath oil  · Other: Lorazepam (stopped this)    Blood thinners: Plavix for     Interventional Therapies:   · L4/5 Epidural steroid injection: For many years.  She is unsure of the last one  · Right SI joint: No benefit  · Right L5-S3 MBB: positive  · 06/10/2019:  Right L5-S3 RFA:  50% benefit  · 06/25/2019:  Right greater trochanteric bursa injection:  50% benefit    Relevant Surgeries:   · 2006: Lumbar surgery  · 2007: Lumbar surgery  · 2012: Lumbar surgery, PISF  · 2006: Cervical surgery    Affecting sleep? Yes, she cannot sleep due to "brain not turning off"    Affecting daily activities? Yes, she is unable to mop, cook, or do most activities    Depressive symptoms? Yes, she has been diagnosed with depression          · SI/HI? No    Work status: Criminal Justice, disabled due to pain    Prescription Monitoring Program database:  Reviewed and consistent with medication use as prescribed.    Last 3 PDI Scores 9/23/2019 8/20/2019 7/22/2019   Pain Disability Index (PDI) 48 [phone] 45       Opioid Risk Score       Value Time User    Opioid Risk Score  4 " 5/21/2019  3:57 PM Clare Alfonso MA          GENERAL:  Positive for fatigue.  No weight loss, malaise or fevers.  HEENT:   No recent changes in vision or hearing  NECK:  Negative for lumps, no difficulty with swallowing.  RESPIRATORY:  Positive for cough.  Negative for wheezing or shortness of breath, patient denies any recent URI.  CARDIOVASCULAR:  Negative for chest pain, leg swelling or palpitations.  GI:  Positive for diarrhea.  Negative for abdominal discomfort, blood in stools or black stools or change in bowel habits.  MUSCULOSKELETAL:  See HPI.  SKIN:  Positive for color change, texture change.  Negative for lesions, rash, and itching.  PSYCH:  Positive for anxiety.  No other mood disorder or recent psychosocial stressors.    HEMATOLOGY/LYMPHOLOGY:  Positive for easy bruising.  Negative for prolonged bleeding or swollen nodes.    ENDO:  Positive for hypothyroidism and diabetes.    NEURO:   Positive for headaches, head trauma (due to recent fall), loss of balance, difficulty sleeping.  No history of syncope, paralysis, seizures or tremors.  All other reviewed and negative other than HPI.          Past Medical History:   Diagnosis Date    Acute pancreatitis     CHF (congestive heart failure)     COPD (chronic obstructive pulmonary disease)     Depression     Diverticulosis     GERD (gastroesophageal reflux disease)     Hypertension     Thyroid disease        Past Surgical History:   Procedure Laterality Date    ADRENAL GLAND SURGERY      CHOLECYSTECTOMY      COLONOSCOPY      HERNIA REPAIR      HYSTERECTOMY      INCISIONAL HERNIA REPAIR      instestine      PARATHYROID GLAND SURGERY      3 surgeries    Radiofrequency Ablation - RIGHT L3-5 RADIOFREQUENCY ABLATION WITH HALYARD COOLIEF THERMAL SYSTEM Right 6/10/2019    [facility]    RIGHT L5-S3 MEDIAL BRANCH BLOCKS Right 5/27/2019    [facility] at [facility] OR    UPPER GASTROINTESTINAL  ENDOSCOPY         Review of patient's allergies indicates:   Allergen Reactions    Aspirin     Lortab [hydrocodone-acetaminophen] Itching    Phenergan [promethazine]     Promethazine hcl        Current Outpatient Medications   Medication Sig Dispense Refill    acetaminophen (TYLENOL) 325 MG tablet Take 2 tablets (650 mg total) by mouth every 6 (six) hours as needed (or equal to 101 degree F).      allopurinol (ZYLOPRIM) 300 MG tablet       amlodipine-benazepril 5-10 mg (LOTREL) 5-10 mg per capsule       baclofen (LIORESAL) 10 MG tablet Take 1 tablet (10 mg total) by mouth 2 (two) times daily. 60 tablet 6    buPROPion (WELLBUTRIN XL) 150 MG TB24 tablet Take 1 tablet (150 mg total) by mouth once daily. 90 tablet 3    clopidogrel (PLAVIX) 75 mg tablet Take 1 tablet (75 mg total) by mouth once daily. 14 tablet 0    colestipol (COLESTID) 1 gram Tab       escitalopram (LEXAPRO) 20 MG tablet Take 20 mg by mouth once daily.      gabapentin (NEURONTIN) 400 MG capsule Take 1 capsule (400 mg total) by mouth 4 (four) times daily. 360 capsule 3    glycopyrrolate (ROBINUL) 1 mg Tab TK 1 T PO BID 90 tablet 3    levothyroxine (SYNTHROID) 50 MCG tablet Take 50 mcg by mouth once daily.      metoprolol tartrate (LOPRESSOR) 100 MG tablet Take 1 tablet (100 mg total) by mouth 2 (two) times daily. 90 tablet 3    ondansetron (ZOFRAN) 4 MG tablet Take 2 tablets (8 mg total) by mouth every 8 (eight) hours as needed for Nausea. 100 tablet 0    pantoprazole (PROTONIX) 40 MG tablet Take 40 mg by mouth once daily.      potassium chloride SA (K-DUR,KLOR-CON) 20 MEQ tablet Take 1 tablet (20 mEq total) by mouth once daily. 30 tablet 3    rosuvastatin (CRESTOR) 40 MG Tab Take 1 tablet (40 mg total) by mouth once daily. 90 tablet 3    traZODone (DESYREL) 100 MG tablet Take 1 tablet (100 mg total) by mouth every evening. 30 tablet 3     No current facility-administered medications for this visit.        Family History   Problem  "Relation Age of Onset    Stroke Maternal Grandmother     Cancer Maternal Grandfather     Stroke Mother     Heart disease Father        Social History     Socioeconomic History    Marital status: Single     Spouse name: Not on file    Number of children: 0    Years of education: Not on file    Highest education level: Not on file   Occupational History    Not on file   Social Needs    Financial resource strain: Not on file    Food insecurity:     Worry: Not on file     Inability: Not on file    Transportation needs:     Medical: Not on file     Non-medical: Not on file   Tobacco Use    Smoking status: Current Every Day Smoker     Packs/day: 3.00     Years: 15.00     Pack years: 45.00    Smokeless tobacco: Never Used   Substance and Sexual Activity    Alcohol use: No    Drug use: No    Sexual activity: Not Currently   Lifestyle    Physical activity:     Days per week: Not on file     Minutes per session: Not on file    Stress: Not on file   Relationships    Social connections:     Talks on phone: Not on file     Gets together: Not on file     Attends Judaism service: Not on file     Active member of club or organization: Not on file     Attends meetings of clubs or organizations: Not on file     Relationship status: Not on file   Other Topics Concern    Not on file   Social History Narrative    Not on file       Objective:     Vitals:    09/23/19 1545 09/23/19 1555   BP: (!) 200/92 (!) 190/93   Pulse: (!) 59    Resp: 16    Temp: 99.3 °F (37.4 °C)    SpO2: 96%    Weight: 85.7 kg (188 lb 15 oz)    Height: 5' 7" (1.702 m)    PainSc:   8    PainLoc: Hip        GEN:  Well developed, well nourished.  No acute distress. No pain behavior.  HEENT:  No trauma.  Mucous membranes moist.  Nares patent bilaterally.  PSYCH: Normal affect. Thought content appropriate.  CHEST:  Breathing symmetric.  No audible wheezing.  ABD: Soft, non-distended.  SKIN:  Warm, pink, dry.  No rash on exposed areas.    EXT:  No " cyanosis, clubbing, or edema.  No color change or changes in nail or hair growth.  NEURO/MUSCULOSKELETAL:  Fully alert, oriented, and appropriate. Speech normal kevin. No cranial nerve deficits.   Gait: Antalgic.    SI joint/hip:  Positive MIKEY and FADIR on the right  TTP over right GTB    Previous physical exam:  Present trendelenburg sign bilaterally.   Motor Strength: 5/5 motor strength throughout lower extremities.   Sensory: Decreased sensation in bilateral soles of her feet equally.  Increased sensation in a right L5.   Reflexes:  1+ and symmetric throughout.  Downgoing Babinski's bilaterally.  No clonus or spasticity.  L-Spine:  Decreased ROM with pain on flexion. Minimal pain with axial/facet loading bilaterally.  Positive SLR on the left for pain in an S1  SI Joint/Hip: Positive MIKEY on the right.  Negative MIKEY on the left and FADIR bilaterally.  TTP over lumbar paraspinals, right SI joint   No TTP over right lumbar paraspinals, right SI joint.      Imaging:      The imaging studies listed below were independently reviewed by me, and I agree with the findings as documented below.     Narrative     EXAMINATION:  XR LUMBAR SPINE 5 VIEW WITH FLEX AND EXT    CLINICAL HISTORY:  axial low back pain;    TECHNIQUE:  Seven views of the lumbar spine were obtained, with AP, lateral, lumbosacral lateral, both oblique, and lateral flexion and extension radiographs submitted.    COMPARISON:  No prior conventional radiographic examinations of the lumbar spine are currently available for comparison purposes.    FINDINGS:  Postoperative changes are identified, including laminectomies at L4 and L5 and a posterior fusion at L4-5, with bilateral pedicle screws seen in both L4 and L5 attached to vertical stabilization bars, and a disc implant/spacer at L4-5 observed.  Minimal anterolisthesis of L4 in relation L5 is identified, alignment at other levels appearing unremarkable.  No significant translational instability at  any thoracolumbar level is seen on the dynamic weight- bearing flexion/extension images.  Vertebral body heights are normally maintained, without significant compression deformity at any level.  No significant disc narrowing.  Multilevel marginal vertebral endplate spurring is seen throughout the thoracolumbar spine.  Vertebral endplates are well defined.  No osseous destructive process.  Prominent aortoiliac calcification is seen, without definable aneurysm, and there are surgical clips in the upper abdomen to both sides of midline.      Impression       1. Postoperative changes of L4 and L5 laminectomy and instrumented spinal fusion at L4-5.  2. Multilevel marginal vertebral endplate spurring.      Electronically signed by: Maurilio Myles MD  Date: 07/16/2019  Time: 14:02       Narrative     EXAMINATION:  MRI LUMBAR SPINE WITHOUT CONTRAST    CLINICAL HISTORY:  lumbar stenosis; Postlaminectomy syndrome, not elsewhere classified    TECHNIQUE:  Multiplanar, multisequence MR images were acquired from the thoracolumbar junction to the sacrum without the administration of contrast.    COMPARISON:  MRI lumbar spine 08/15/2018.    FINDINGS:  Prior posterior fusion and dorsal decompression from L4-L5.  Interbody cage in place.  Normal alignment.  The remaining lumbar vertebral bodies are normal in height and alignment.  No acute fracture.  No marrow edema.    Visualized distal thoracic spinal cord is normal contour and signal intensity.  Conus medullaris terminates at L1-L2.    L1-L2: Mild facet joint hypertrophy.  No central spinal canal or foraminal stenosis.    L2-L3: Broad-based disc bulging with facet joint hypertrophy results in mild bilateral neural foraminal narrowing.  No central spinal canal stenosis.    L3-L4: Broad-based disc bulging with facet joint hypertrophy and ligamentum flavum thickening results in mild central spinal canal stenosis with moderate bilateral neural foraminal narrowing.  Narrowed AP canal  diameter measures 11.0 mm.    L4-L5: Prior posterior fusion and dorsal decompression with bilateral laminectomy.  Endplate osteophytosis with facet joint hypertrophy results in severe narrowing of the right neural foramen and moderate narrowing the left neural foramen.  No central spinal canal stenosis.    L5-S1: Previous dorsal decompression with bilateral laminectomy.  Broad-based disc bulging slightly eccentric to the right with facet joint hypertrophy results in severe narrowing of the right neural foramen and moderate narrowing the left neural foramen.  No central spinal canal stenosis.      Impression       1. Prior posterior fusion and dorsal decompression from L4-L5.  2. Degenerative disc disease at L2-L3 resulting in mild bilateral neural foraminal narrowing.  3. Degenerative disc disease at L3-L4 resulting in mild central spinal canal stenosis with moderate bilateral neural foraminal narrowing.  4. Multilevel degenerative disc disease from L4 through S1 resulting in severe narrowing of the right neural foramen and moderate narrowing the left neural foramen.      Electronically signed by: Jesús Dawson  Date: 05/23/2019  Time: 10:10     CT Abd/Pelvis shows no evidence of significant spinal canal stenosis in the lower thoracic spine      Assessment:     Encounter Diagnoses   Name Primary?    Chronic pain disorder Yes    Postlaminectomy syndrome of lumbar region     DDD (degenerative disc disease), lumbar     Lumbosacral spondylolysis     Greater trochanteric bursitis of right hip     Opioid contract exists     Chronic anticoagulation        Plan:     Diagnoses and all orders for this visit:    Chronic pain disorder    Postlaminectomy syndrome of lumbar region    DDD (degenerative disc disease), lumbar    Lumbosacral spondylolysis    Greater trochanteric bursitis of right hip    Opioid contract exists    Chronic anticoagulation    Chronic right hip pain  -     HYDROcodone-acetaminophen (NORCO) 7.5-325  mg per tablet; Take 1 tablet by mouth 2 (two) times daily as needed for Pain.  -     lidocaine HCL 10 mg/ml (1%) injection 3 mL  -     bupivacaine (PF) 0.25% (2.5 mg/ml) injection 15 mg  -     betamethasone acetate-betamethasone sodium phosphate injection 6 mg         She presents today with a longstanding chronic pain history with several recent medical issues that are complicating the picture.  We will work to control her pain with as few sedating medications as possible.  It is reasonable to think that these may have had something to do with her falls given her symptoms of amnesia surrounding the event, lightheadedness, and confusion.      We discussed the assessment and recommendations.  All available images were reviewed. We discussed the disease process, prognosis, treatment plan, and risks and benefits. The patient is aware of the risks and benefits of the medications being prescribed, common side effects, and proper usage. The following is the plan we agreed on:     1. Right hip injection today as below. The procedure was explained in detail, including risks, benefits, and alternatives.  All questions answered.  Consent obtained today.  2. We had an extensive discussion regarding our options moving forward.  We again discussed spinal cord stimulation.  Will discuss this again in her next visit  3. Can repeat right L5-S3 RFA PRN  4. Continue the Norco 7.5/325 mg twice daily as needed.  Currently, the benefits outweigh the risks.  We had a long discussion regarding the risks and benefits of narcotic therapy.  We will work to minimize these as much as possible.  [facility] reviewed and is appropriate.  Opioid contract signed today.  UDS done 06/25/2019:  Consistent  5. We had an extensive discussion regarding the expected time course for PT for chronic pain vs after a surgery.  Specifically, we discussed that PT for chronic pain almost always causes a worsening of pain before any improvement, which is  generally not seen for 3-6 months, in which time she will likely already be home doing home exercises given that a formal course of PT generally lasts anywhere from 6-12 weeks.  6. Recommend she obtain a foam roller for home use for her bursitis  7. Recommend that she obtain a TENS unit for home use  8. She would be a good candidate for FRP in the future  9. RTC for above or sooner if needed.    Date of Procedure: 09/23/2019    Procedure:  Right hip intra-articular injection using ultrasound guidance    Pre-op diagnosis:  Right hip pain    Post-op diagnosis:  Right hip pain    Physician: Dr. Sneha Aragon     Assistant: None    Anesthestia: Local    EBL: None    Specimens: None    Ultrasound-guided right hip intra-articular injection:   Consent for the procedure was obtained after the procedure was fully explained to the patient.     All medications, allergies, and relevant histories were reviewed. No recent antibiotics or infections.  A time-out was taken to verify the correct patient, procedure, laterality, and appropriate medications/allergies.    Patient was placed in the supine position with right hip slightly externally rotated. Area was prepped with chlorhexidine. Sterile precautions observed throughout the procedure. Xylocaine 1% was infiltrated locally over the entry site over the hip joint on the left side. A 21-gauge B bevel needle was introduced in the hip joint space under US guidance. A 7 mL solution of 0.25% Bupivacaine with Betamethasone 6 mg was injected after repeated negative aspirations.  US guidance showed good spread of local anesthesia. Needle was removed and a dressing was applied.  Patient tolerated the procedure well without any complications.      Patient tolerated the procedure well without any complications    Sneha Aragon MD  09/23/2019     The above plan and management options were discussed at length with patient. Patient is in agreement with the above and verbalized understanding.  It will be communicated with the referring physician via electronic record, fax, or mail.

## 2019-09-24 ENCOUNTER — TELEPHONE (OUTPATIENT)
Dept: ENDOSCOPY | Facility: HOSPITAL | Age: 64
End: 2019-09-24

## 2019-09-24 NOTE — TELEPHONE ENCOUNTER
----- Message from Silvia Mayberry sent at 9/24/2019  9:36 AM CDT -----  Contact: self [phone]  Pt is returning Ese's call    Contact: self [phone]

## 2019-09-24 NOTE — TELEPHONE ENCOUNTER
Spoke with patient. EUS scheduled for 10/11 at  pending ok to Plavix. Reviewed prep instructions. Ms Nelson verbalized understanding.

## 2019-09-25 ENCOUNTER — TELEPHONE (OUTPATIENT)
Dept: ENDOSCOPY | Facility: HOSPITAL | Age: 64
End: 2019-09-25

## 2019-09-25 NOTE — TELEPHONE ENCOUNTER
Spoke with patient about UEUS tentatively scheduled 10/11/19 at 1300.  States she saw cardiologist, Dr Jovanni Serrano at [facility] in June 2019.  Epic message sent to Dr Serrano requesting permission to hold Plavix.  Will f/u w/patient after response.

## 2019-09-25 NOTE — TELEPHONE ENCOUNTER
Dr Jovanni Serrano, may we hold Plavix for 5 days prior to Upper EUS for pancreatic cyst scheduled 10/11/19?  Thank you.

## 2019-09-26 ENCOUNTER — TELEPHONE (OUTPATIENT)
Dept: ENDOSCOPY | Facility: HOSPITAL | Age: 64
End: 2019-09-26

## 2019-09-26 NOTE — TELEPHONE ENCOUNTER
Called about JEVONS scheduled 10/11/19 at 1300.  Left voicemail with call back number for questions.  Instructions mailed.  She will hold Plavix for 5 days prior to procedure with permission from Dr Jovanni Serrano.

## 2019-09-27 RX ADMIN — BUPIVACAINE HYDROCHLORIDE 15 MG: 2.5 INJECTION, SOLUTION EPIDURAL; INFILTRATION; INTRACAUDAL at 06:09

## 2019-10-03 ENCOUNTER — HOSPITAL ENCOUNTER (OUTPATIENT)
Dept: RADIOLOGY | Facility: HOSPITAL | Age: 64
Discharge: HOME | End: 2019-10-03
Attending: NURSE PRACTITIONER
Payer: MEDICARE

## 2019-10-03 ENCOUNTER — TELEPHONE (OUTPATIENT)
Dept: FAMILY MEDICINE | Facility: CLINIC | Age: 64
End: 2019-10-03

## 2019-10-03 DIAGNOSIS — Z87.891 PERSONAL HISTORY OF NICOTINE DEPENDENCE: ICD-10-CM

## 2019-10-03 DIAGNOSIS — N30.01 ACUTE CYSTITIS WITH HEMATURIA: Primary | ICD-10-CM

## 2019-10-03 DIAGNOSIS — R10.84 GENERALIZED ABDOMINAL PAIN: ICD-10-CM

## 2019-10-03 DIAGNOSIS — Z12.2 ENCOUNTER FOR SCREENING FOR LUNG CANCER: ICD-10-CM

## 2019-10-03 DIAGNOSIS — R30.0 DYSURIA: ICD-10-CM

## 2019-10-03 DIAGNOSIS — Z12.9 SCREENING FOR CANCER: ICD-10-CM

## 2019-10-03 PROCEDURE — 74176 CT RENAL STONE STUDY ABD PELVIS WO: ICD-10-PCS | Mod: 26,,, | Performed by: RADIOLOGY

## 2019-10-03 PROCEDURE — G0297 LDCT FOR LUNG CA SCREEN: HCPCS | Mod: TC

## 2019-10-03 PROCEDURE — G0297 CT CHEST LUNG SCREENING LOW DOSE: ICD-10-PCS | Mod: 26,,, | Performed by: RADIOLOGY

## 2019-10-03 PROCEDURE — 74176 CT ABD & PELVIS W/O CONTRAST: CPT | Mod: TC

## 2019-10-03 PROCEDURE — G0297 LDCT FOR LUNG CA SCREEN: HCPCS | Mod: 26,,, | Performed by: RADIOLOGY

## 2019-10-03 PROCEDURE — 74176 CT ABD & PELVIS W/O CONTRAST: CPT | Mod: 26,,, | Performed by: RADIOLOGY

## 2019-10-03 RX ORDER — SULFAMETHOXAZOLE AND TRIMETHOPRIM 800; 160 MG/1; MG/1
1 TABLET ORAL 2 TIMES DAILY
Qty: 28 TABLET | Refills: 0 | Status: SHIPPED | OUTPATIENT
Start: 2019-10-03 | End: 2019-10-08 | Stop reason: SINTOL

## 2019-10-03 NOTE — PROGRESS NOTES
Please let Ms. Nelson know that I reviewed her CT scan. It showed some inflammatory change and underlying UTI and possibly pyelonephritis. I am going to send in bactrim twice a day for 2 weeks. Eat with medicine. Drink plenty of water. I want her to do a repeat urine in 2 weeks. Can we make her a follow up for 2 weeks? Her CT also showed mild liver enlargement and Chronic intraluminal lipoma of the ascending colon. I believe patient is seeing Dr. Rodriguez for GI

## 2019-10-03 NOTE — TELEPHONE ENCOUNTER
Pt informed of lab results- 2 wk f/u scheduled per PCP.      ----- Message from Dorys Kelly sent at 10/3/2019 12:55 PM CDT -----  Contact: pt  Pt states that she had a missed call from the office,had some test done today and believes calling with the results..[phone] (home)

## 2019-10-04 ENCOUNTER — TELEPHONE (OUTPATIENT)
Dept: FAMILY MEDICINE | Facility: CLINIC | Age: 64
End: 2019-10-04

## 2019-10-04 DIAGNOSIS — N12 PYELONEPHRITIS: ICD-10-CM

## 2019-10-04 DIAGNOSIS — R35.0 URINARY FREQUENCY: Primary | ICD-10-CM

## 2019-10-04 DIAGNOSIS — Z87.891 PERSONAL HISTORY OF NICOTINE DEPENDENCE: ICD-10-CM

## 2019-10-04 DIAGNOSIS — Z12.9 SCREENING FOR CANCER: ICD-10-CM

## 2019-10-04 NOTE — PROGRESS NOTES
Please let Ms. Nelson know I reviewed her CT which showed some abnormal opacities. However, they were very small. I want to repeat this in 6 months.   I did place her on an antibiotic yesterday. I hope that her symptoms start to improve. If she develops fever/chills, worsening pain or concerns, she should go to the ER. If she is improving, I will see her and need to recollect a UA after her antibiotics are complete. Please let me know if she has any concerns

## 2019-10-04 NOTE — TELEPHONE ENCOUNTER
See lab result note.     ----- Message from Jessica Sauceda sent at 10/4/2019 11:19 AM CDT -----  Contact: Patient  Type:  Patient Returning Call    Who Called:  Patient  Who Left Message for Patient:  Maribell  Does the patient know what this is regarding?:  no  Best Call Back Number:[phone]

## 2019-10-08 DIAGNOSIS — N12 PYELONEPHRITIS: Primary | ICD-10-CM

## 2019-10-08 RX ORDER — CIPROFLOXACIN 500 MG/1
500 TABLET ORAL EVERY 12 HOURS
Qty: 28 TABLET | Refills: 0 | Status: SHIPPED | OUTPATIENT
Start: 2019-10-08 | End: 2019-10-17

## 2019-10-08 NOTE — TELEPHONE ENCOUNTER
----- Message from Ariadna Cornejo sent at 10/8/2019  8:06 AM CDT -----  Type: Needs Medical Advice    Who Called:  Patient  Pharmacy name and phone #:    [id number] [place] [facility] [address]  [address]  [place] [address]  Phone: [phone] Fax: [phone]    Best Call Back Number: [phone] (home)     Additional Information: Office put patient on sulfamethoxazole-trimethoprim 800-160mg (BACTRIM DS) 800-160 mg Tab. She states she cannot take this because it is upsetting her stomach and making her sleep. Please call to advise.

## 2019-10-08 NOTE — TELEPHONE ENCOUNTER
MARY ANN that RX was called in to pharmacy and also for her to call office to schedule a f/u appt . mariya

## 2019-10-15 ENCOUNTER — HOSPITAL ENCOUNTER (OUTPATIENT)
Dept: RADIOLOGY | Facility: HOSPITAL | Age: 64
Discharge: HOME | End: 2019-10-15
Attending: NURSE PRACTITIONER
Payer: MEDICARE

## 2019-10-15 ENCOUNTER — TELEPHONE (OUTPATIENT)
Dept: FAMILY MEDICINE | Facility: CLINIC | Age: 64
End: 2019-10-15

## 2019-10-15 DIAGNOSIS — I77.811 ABDOMINAL AORTIC ECTASIA: ICD-10-CM

## 2019-10-15 PROCEDURE — 76775 US EXAM ABDO BACK WALL LIM: CPT | Mod: 26,,, | Performed by: RADIOLOGY

## 2019-10-15 PROCEDURE — 76775 US ABDOMINAL AORTA: ICD-10-PCS | Mod: 26,,, | Performed by: RADIOLOGY

## 2019-10-15 PROCEDURE — 76775 US EXAM ABDO BACK WALL LIM: CPT | Mod: TC

## 2019-10-15 NOTE — TELEPHONE ENCOUNTER
LVM.        ----- Message from Cheryl Bradley NP sent at 10/15/2019 11:56 AM CDT -----  Please let patient know that I reviewed ultrasound of her aorta. It shows moderate atherosclerosis (plaque) and aortic ectasis, meaning (dilation or enlargement). I want her to see a cardiologist regarding this. Does she have preference? Please let me know

## 2019-10-15 NOTE — PROGRESS NOTES
Please let patient know that I reviewed ultrasound of her aorta. It shows moderate atherosclerosis (plaque) and aortic ectasis, meaning (dilation or enlargement). I want her to see a cardiologist regarding this. Does she have preference? Please let me know

## 2019-10-16 ENCOUNTER — TELEPHONE (OUTPATIENT)
Dept: FAMILY MEDICINE | Facility: CLINIC | Age: 64
End: 2019-10-16

## 2019-10-16 NOTE — TELEPHONE ENCOUNTER
LVM for pt to return call to discuss results, or it can be discussed at appt with PCP tomorrow.        ----- Message from Nilam Reza sent at 10/16/2019 11:00 AM CDT -----  Type:  Patient Returning Call    Who Called:  Patient   Who Left Message for Patient:  Cesia Vu  Does the patient know what this is regarding?:  ??  Best Call Back Number:  [phone]  Additional Information:

## 2019-10-17 ENCOUNTER — OFFICE VISIT (OUTPATIENT)
Dept: FAMILY MEDICINE | Facility: CLINIC | Age: 64
End: 2019-10-17
Payer: MEDICARE

## 2019-10-17 VITALS
SYSTOLIC BLOOD PRESSURE: 158 MMHG | OXYGEN SATURATION: 95 % | RESPIRATION RATE: 18 BRPM | HEART RATE: 78 BPM | WEIGHT: 191.19 LBS | DIASTOLIC BLOOD PRESSURE: 82 MMHG | BODY MASS INDEX: 30.01 KG/M2 | TEMPERATURE: 99 F | HEIGHT: 67 IN

## 2019-10-17 DIAGNOSIS — N12 PYELONEPHRITIS: ICD-10-CM

## 2019-10-17 DIAGNOSIS — E78.5 HYPERLIPIDEMIA, UNSPECIFIED HYPERLIPIDEMIA TYPE: ICD-10-CM

## 2019-10-17 DIAGNOSIS — E03.9 HYPOTHYROIDISM, UNSPECIFIED TYPE: ICD-10-CM

## 2019-10-17 DIAGNOSIS — K21.9 GASTROESOPHAGEAL REFLUX DISEASE, ESOPHAGITIS PRESENCE NOT SPECIFIED: Primary | ICD-10-CM

## 2019-10-17 DIAGNOSIS — I10 ESSENTIAL HYPERTENSION: ICD-10-CM

## 2019-10-17 LAB
BILIRUB SERPL-MCNC: NORMAL MG/DL
BILIRUB UR QL STRIP: NEGATIVE
BLOOD URINE, POC: NORMAL
CLARITY UR: CLEAR
COLOR UR: YELLOW
COLOR, POC UA: YELLOW
GLUCOSE UR QL STRIP: NEGATIVE
GLUCOSE UR QL STRIP: NORMAL
HGB UR QL STRIP: NEGATIVE
KETONES UR QL STRIP: NEGATIVE
KETONES UR QL STRIP: NORMAL
LEUKOCYTE ESTERASE UR QL STRIP: NEGATIVE
LEUKOCYTE ESTERASE URINE, POC: NORMAL
NITRITE UR QL STRIP: NEGATIVE
NITRITE, POC UA: NORMAL
PH UR STRIP: 6 [PH] (ref 5–8)
PH, POC UA: 6
PROT UR QL STRIP: ABNORMAL
PROTEIN, POC: 30
SP GR UR STRIP: 1.02 (ref 1–1.03)
SPECIFIC GRAVITY, POC UA: 1.03
URN SPEC COLLECT METH UR: ABNORMAL
UROBILINOGEN UR STRIP-ACNC: NEGATIVE EU/DL
UROBILINOGEN, POC UA: NORMAL

## 2019-10-17 PROCEDURE — 99214 PR OFFICE/OUTPT VISIT, EST, LEVL IV, 30-39 MIN: ICD-10-PCS | Mod: 25,S$GLB,, | Performed by: NURSE PRACTITIONER

## 2019-10-17 PROCEDURE — 99214 OFFICE O/P EST MOD 30 MIN: CPT | Mod: 25,S$GLB,, | Performed by: NURSE PRACTITIONER

## 2019-10-17 PROCEDURE — 81002 URINALYSIS NONAUTO W/O SCOPE: CPT | Mod: S$GLB,,, | Performed by: NURSE PRACTITIONER

## 2019-10-17 PROCEDURE — 99999 PR PBB SHADOW E&M-EST. PATIENT-LVL IV: CPT | Mod: PBBFAC,,, | Performed by: NURSE PRACTITIONER

## 2019-10-17 PROCEDURE — 3008F PR BODY MASS INDEX (BMI) DOCUMENTED: ICD-10-PCS | Mod: CPTII,S$GLB,, | Performed by: NURSE PRACTITIONER

## 2019-10-17 PROCEDURE — 3008F BODY MASS INDEX DOCD: CPT | Mod: CPTII,S$GLB,, | Performed by: NURSE PRACTITIONER

## 2019-10-17 PROCEDURE — 81003 URINALYSIS AUTO W/O SCOPE: CPT

## 2019-10-17 PROCEDURE — 99999 PR PBB SHADOW E&M-EST. PATIENT-LVL IV: ICD-10-PCS | Mod: PBBFAC,,, | Performed by: NURSE PRACTITIONER

## 2019-10-17 PROCEDURE — 3079F PR MOST RECENT DIASTOLIC BLOOD PRESSURE 80-89 MM HG: ICD-10-PCS | Mod: CPTII,S$GLB,, | Performed by: NURSE PRACTITIONER

## 2019-10-17 PROCEDURE — 3079F DIAST BP 80-89 MM HG: CPT | Mod: CPTII,S$GLB,, | Performed by: NURSE PRACTITIONER

## 2019-10-17 PROCEDURE — 3077F SYST BP >= 140 MM HG: CPT | Mod: CPTII,S$GLB,, | Performed by: NURSE PRACTITIONER

## 2019-10-17 PROCEDURE — 81002 POCT URINE DIPSTICK WITHOUT MICROSCOPE: ICD-10-PCS | Mod: S$GLB,,, | Performed by: NURSE PRACTITIONER

## 2019-10-17 PROCEDURE — 3077F PR MOST RECENT SYSTOLIC BLOOD PRESSURE >= 140 MM HG: ICD-10-PCS | Mod: CPTII,S$GLB,, | Performed by: NURSE PRACTITIONER

## 2019-10-17 RX ORDER — ROSUVASTATIN CALCIUM 20 MG/1
20 TABLET, COATED ORAL DAILY
Qty: 90 TABLET | Refills: 3 | Status: SHIPPED | OUTPATIENT
Start: 2019-10-17 | End: 2020-09-23

## 2019-10-17 RX ORDER — PANTOPRAZOLE SODIUM 40 MG/1
40 TABLET, DELAYED RELEASE ORAL DAILY
Qty: 90 TABLET | Refills: 3 | Status: SHIPPED | OUTPATIENT
Start: 2019-10-17 | End: 2020-10-08

## 2019-10-17 RX ORDER — BENAZEPRIL HYDROCHLORIDE 10 MG/1
10 TABLET ORAL DAILY
Qty: 90 TABLET | Refills: 3 | Status: SHIPPED | OUTPATIENT
Start: 2019-10-17 | End: 2019-11-14

## 2019-10-17 NOTE — PROGRESS NOTES
"Subjective:       Patient ID: Alessia Nelson is a 64 y.o. female.    Chief Complaint: Follow-up and Immunizations (flu)    Ms. Alessia Nelson is a 64 year old female who presents to the clinic today for follow up exam. Patient continues to take cipro BID. Continues to state she feels pressure, fullness, and burning. Reports "my urine feels thick."   In regards to the patient's hypertension, patient denies chest pain/sob, and has been compliant with the medication regimen. hypertension poorly controlled and well controlled. Goal of <130/80. Meds and labs as per orders. Patient was not taking amlodpine - benazpril because her blood pressure was getting too low.   In regards to their hypothyroidism, patient denies poor energy, skin and hair changes, as well as weight gain. TSH  Not up to date. Patient taking 50 mcg of levothyroxine daily. Complaint without side effects.   Patient continues to take allpurinol for gout without any concerns. Denies any recent flare ups.  Patient continues to see Dr. Aragon for pain management. She has right knee brace on today.   In regards to mood, patient reports mood is stable but reports she has her "days" and reports it is more related to her pain.             Review of Systems   Constitutional: Negative for activity change, chills, fatigue, fever and unexpected weight change.   HENT: Negative for congestion, ear pain, postnasal drip, sinus pressure, sneezing, sore throat and tinnitus.    Eyes: Negative for pain, redness and visual disturbance.   Respiratory: Negative for apnea, cough, chest tightness, shortness of breath and wheezing.    Cardiovascular: Negative for chest pain, palpitations and leg swelling.   Gastrointestinal: Positive for abdominal pain. Negative for abdominal distention, blood in stool, constipation, diarrhea, nausea (intermittent) and vomiting.   Endocrine: Negative for polydipsia, polyphagia and polyuria.   Genitourinary: Positive for frequency and urgency. " "Negative for difficulty urinating, dysuria, flank pain and hematuria.   Musculoskeletal: Positive for arthralgias, back pain and myalgias. Negative for joint swelling.        Sees pain management   Skin: Negative for color change, pallor and rash.   Allergic/Immunologic: Negative for environmental allergies, food allergies and immunocompromised state.   Neurological: Negative for dizziness, tremors, syncope, weakness, light-headedness and headaches.   Hematological: Does not bruise/bleed easily.   Psychiatric/Behavioral: Negative for agitation, confusion, decreased concentration, self-injury, sleep disturbance and suicidal ideas. The patient is not nervous/anxious and is not hyperactive.          Reviewed family, medical, surgical, and social history.    Objective:      BP (!) 158/82   Pulse 78   Temp 98.7 °F (37.1 °C) (Tympanic)   Resp 18   Ht 5' 7" (1.702 m)   Wt 86.7 kg (191 lb 3.2 oz)   SpO2 95%   BMI 29.95 kg/m²   Physical Exam   Constitutional: She is oriented to person, place, and time. She appears well-developed and well-nourished. She is cooperative. No distress.   HENT:   Head: Normocephalic and atraumatic.   Right Ear: Hearing, tympanic membrane, external ear and ear canal normal.   Left Ear: Hearing, tympanic membrane, external ear and ear canal normal.   Nose: Nose normal.   Mouth/Throat: Uvula is midline, oropharynx is clear and moist and mucous membranes are normal. No uvula swelling.   Eyes: Pupils are equal, round, and reactive to light. Conjunctivae, EOM and lids are normal.   Neck: Trachea normal and full passive range of motion without pain. No tracheal tenderness present. No neck rigidity. No thyroid mass present.   Cardiovascular: Normal rate, regular rhythm, S1 normal, S2 normal, normal heart sounds, intact distal pulses and normal pulses.   Pulmonary/Chest: Effort normal and breath sounds normal. No respiratory distress. She has no decreased breath sounds. She has no wheezes. "   Abdominal: Soft. Normal appearance and bowel sounds are normal. She exhibits no distension and no abdominal bruit. There is tenderness. There is no guarding and no CVA tenderness.   Musculoskeletal: She exhibits no edema, tenderness or deformity.   Lymphadenopathy:     She has no cervical adenopathy.   Neurological: She is alert and oriented to person, place, and time. She has normal strength. She exhibits normal muscle tone.   Skin: Skin is warm, dry and intact. Capillary refill takes less than 2 seconds. No abrasion, no laceration, no lesion and no rash noted. She is not diaphoretic. No cyanosis. Nails show no clubbing.   Psychiatric: She has a normal mood and affect. Her speech is normal and behavior is normal. Judgment and thought content normal. Cognition and memory are normal.       Assessment:       1. Gastroesophageal reflux disease, esophagitis presence not specified    2. Pyelonephritis    3. Hypothyroidism, unspecified type    4. Hyperlipidemia, unspecified hyperlipidemia type    5. Essential hypertension        Plan:       Gastroesophageal reflux disease, esophagitis presence not specified  -     pantoprazole (PROTONIX) 40 MG tablet; Take 1 tablet (40 mg total) by mouth once daily.  Dispense: 90 tablet; Refill: 3    Pyelonephritis  -     POCT URINE DIPSTICK WITHOUT MICROSCOPE  -     Urinalysis, Reflex to Urine Culture Urine, Clean Catch  -     Ambulatory referral to Urology    Hypothyroidism, unspecified type  -     TSH; Future; Expected date: 10/17/2019  -     T3; Future; Expected date: 10/17/2019  -     T4; Future; Expected date: 10/17/2019  -     T4, free; Future; Expected date: 10/17/2019    Hyperlipidemia, unspecified hyperlipidemia type  -     rosuvastatin (CRESTOR) 20 MG tablet; Take 1 tablet (20 mg total) by mouth once daily.  Dispense: 90 tablet; Refill: 3    Essential hypertension  -     benazepril (LOTENSIN) 10 MG tablet; Take 1 tablet (10 mg total) by mouth once daily.  Dispense: 90 tablet;  Refill: 3        PLAN:  - Discussed with patient the plan of care  - Obtain labs  - Place patient on flu list - none in clinic today  - Refer urology for UTI/pyelonephritis with no improvement  - Medications reviewed. Medication side effects discussed. Patient has no questions or concerns at this time. Informed patient to notify me regarding any concerns.   - Continue monitoring BP. D/C amlodipine - benazepril and change to benazepril daily    - Informed patient to please notify me with any questions or concerns at anytime  - Follow up ordered for 4 weeks        Risks, benefits, and side effects were discussed with the patient. All questions were answered to the fullest satisfaction of the patient, and pt verbalized understanding and agreement to treatment plan. Pt was to call with any new or worsening symptoms, or present to the ER.

## 2019-10-18 ENCOUNTER — TELEPHONE (OUTPATIENT)
Dept: ENDOSCOPY | Facility: HOSPITAL | Age: 64
End: 2019-10-18

## 2019-10-21 ENCOUNTER — TELEPHONE (OUTPATIENT)
Dept: ENDOSCOPY | Facility: HOSPITAL | Age: 64
End: 2019-10-21

## 2019-10-21 NOTE — TELEPHONE ENCOUNTER
----- Message from Leslee Quiroz sent at 10/21/2019  1:31 PM CDT -----  Contact: pt:  [phone]  Pt is returning call from Ese rivero     Please contact pt:  [phone]

## 2019-10-24 ENCOUNTER — OFFICE VISIT (OUTPATIENT)
Dept: UROLOGY | Facility: CLINIC | Age: 64
End: 2019-10-24
Payer: MEDICARE

## 2019-10-24 VITALS
WEIGHT: 191 LBS | TEMPERATURE: 98 F | RESPIRATION RATE: 16 BRPM | HEART RATE: 50 BPM | HEIGHT: 67 IN | SYSTOLIC BLOOD PRESSURE: 142 MMHG | DIASTOLIC BLOOD PRESSURE: 72 MMHG | BODY MASS INDEX: 29.98 KG/M2

## 2019-10-24 DIAGNOSIS — B37.31 VAGINAL YEAST INFECTION: Primary | ICD-10-CM

## 2019-10-24 DIAGNOSIS — R39.11 HESITANCY: ICD-10-CM

## 2019-10-24 DIAGNOSIS — R30.0 DYSURIA: ICD-10-CM

## 2019-10-24 DIAGNOSIS — N39.0 RECURRENT UTI: ICD-10-CM

## 2019-10-24 PROCEDURE — 99999 PR PBB SHADOW E&M-EST. PATIENT-LVL IV: ICD-10-PCS | Mod: PBBFAC,,, | Performed by: NURSE PRACTITIONER

## 2019-10-24 PROCEDURE — 3077F PR MOST RECENT SYSTOLIC BLOOD PRESSURE >= 140 MM HG: ICD-10-PCS | Mod: CPTII,S$GLB,, | Performed by: NURSE PRACTITIONER

## 2019-10-24 PROCEDURE — 3008F PR BODY MASS INDEX (BMI) DOCUMENTED: ICD-10-PCS | Mod: CPTII,S$GLB,, | Performed by: NURSE PRACTITIONER

## 2019-10-24 PROCEDURE — 3078F DIAST BP <80 MM HG: CPT | Mod: CPTII,S$GLB,, | Performed by: NURSE PRACTITIONER

## 2019-10-24 PROCEDURE — 99999 PR PBB SHADOW E&M-EST. PATIENT-LVL IV: CPT | Mod: PBBFAC,,, | Performed by: NURSE PRACTITIONER

## 2019-10-24 PROCEDURE — 3078F PR MOST RECENT DIASTOLIC BLOOD PRESSURE < 80 MM HG: ICD-10-PCS | Mod: CPTII,S$GLB,, | Performed by: NURSE PRACTITIONER

## 2019-10-24 PROCEDURE — 99214 OFFICE O/P EST MOD 30 MIN: CPT | Mod: S$GLB,,, | Performed by: NURSE PRACTITIONER

## 2019-10-24 PROCEDURE — 99214 PR OFFICE/OUTPT VISIT, EST, LEVL IV, 30-39 MIN: ICD-10-PCS | Mod: S$GLB,,, | Performed by: NURSE PRACTITIONER

## 2019-10-24 PROCEDURE — 3077F SYST BP >= 140 MM HG: CPT | Mod: CPTII,S$GLB,, | Performed by: NURSE PRACTITIONER

## 2019-10-24 PROCEDURE — 3008F BODY MASS INDEX DOCD: CPT | Mod: CPTII,S$GLB,, | Performed by: NURSE PRACTITIONER

## 2019-10-24 RX ORDER — FLUCONAZOLE 150 MG/1
150 TABLET ORAL DAILY
Qty: 3 TABLET | Refills: 0 | Status: SHIPPED | OUTPATIENT
Start: 2019-10-24 | End: 2019-10-27

## 2019-10-24 NOTE — LETTER
October 29, 2019      Cheryl Bradley, ANUM  [address]  [place] [address]           [facility]  [address], [address]  [facility] [place] [address]  Phone: [phone]  Fax: [phone]          Patient: Alessia Nelson   MR Number: [id number]   YOB: 1955   Date of Visit: 10/24/2019       Dear Cheryl Bradley:    Thank you for referring Alessia Nelson to me for evaluation. Attached you will find relevant portions of my assessment and plan of care.    If you have questions, please do not hesitate to call me. I look forward to following Alessia Nelson along with you.    Sincerely,    Lynne Parsons, ANUM    Enclosure  CC:  No Recipients    If you would like to receive this communication electronically, please contact [email or web address] or [phone] to request more information on [employer] Link access.    For providers and/or their staff who would like to refer a patient to [facility], please contact us through our one-stop-shop provider referral line, [facility], at [phone].    If you feel you have received this communication in error or would no longer like to receive these types of communications, please e-mail [email or web address]

## 2019-10-25 DIAGNOSIS — F51.01 PRIMARY INSOMNIA: ICD-10-CM

## 2019-10-25 RX ORDER — TRAZODONE HYDROCHLORIDE 100 MG/1
100 TABLET ORAL NIGHTLY
Qty: 90 TABLET | Refills: 3 | Status: SHIPPED | OUTPATIENT
Start: 2019-10-25 | End: 2020-07-30

## 2019-10-28 ENCOUNTER — OFFICE VISIT (OUTPATIENT)
Dept: PAIN MEDICINE | Facility: CLINIC | Age: 64
End: 2019-10-28
Payer: MEDICARE

## 2019-10-28 ENCOUNTER — TELEPHONE (OUTPATIENT)
Dept: UROLOGY | Facility: CLINIC | Age: 64
End: 2019-10-28

## 2019-10-28 ENCOUNTER — HOSPITAL ENCOUNTER (OUTPATIENT)
Dept: RADIOLOGY | Facility: HOSPITAL | Age: 64
Discharge: HOME | End: 2019-10-28
Attending: ANESTHESIOLOGY
Payer: MEDICARE

## 2019-10-28 VITALS
WEIGHT: 191 LBS | TEMPERATURE: 98 F | HEIGHT: 67 IN | SYSTOLIC BLOOD PRESSURE: 156 MMHG | OXYGEN SATURATION: 95 % | BODY MASS INDEX: 29.98 KG/M2 | HEART RATE: 56 BPM | DIASTOLIC BLOOD PRESSURE: 76 MMHG

## 2019-10-28 DIAGNOSIS — G89.29 CHRONIC PAIN OF RIGHT KNEE: ICD-10-CM

## 2019-10-28 DIAGNOSIS — G89.4 CHRONIC PAIN DISORDER: ICD-10-CM

## 2019-10-28 DIAGNOSIS — G89.4 CHRONIC PAIN DISORDER: Primary | ICD-10-CM

## 2019-10-28 DIAGNOSIS — M70.61 GREATER TROCHANTERIC BURSITIS OF RIGHT HIP: ICD-10-CM

## 2019-10-28 DIAGNOSIS — G89.29 CHRONIC RIGHT HIP PAIN: ICD-10-CM

## 2019-10-28 DIAGNOSIS — M25.561 CHRONIC PAIN OF RIGHT KNEE: ICD-10-CM

## 2019-10-28 DIAGNOSIS — M53.3 SACROILIAC JOINT PAIN: ICD-10-CM

## 2019-10-28 DIAGNOSIS — R30.0 DYSURIA: Primary | ICD-10-CM

## 2019-10-28 DIAGNOSIS — R39.9 LOWER URINARY TRACT SYMPTOMS (LUTS): ICD-10-CM

## 2019-10-28 DIAGNOSIS — M25.551 CHRONIC RIGHT HIP PAIN: ICD-10-CM

## 2019-10-28 PROCEDURE — 99999 PR PBB SHADOW E&M-EST. PATIENT-LVL III: CPT | Mod: PBBFAC,,, | Performed by: ANESTHESIOLOGY

## 2019-10-28 PROCEDURE — 99214 PR OFFICE/OUTPT VISIT, EST, LEVL IV, 30-39 MIN: ICD-10-PCS | Mod: 25,S$GLB,, | Performed by: ANESTHESIOLOGY

## 2019-10-28 PROCEDURE — 3078F DIAST BP <80 MM HG: CPT | Mod: CPTII,S$GLB,, | Performed by: ANESTHESIOLOGY

## 2019-10-28 PROCEDURE — 73521 X-RAY EXAM HIPS BI 2 VIEWS: CPT | Mod: TC,FY

## 2019-10-28 PROCEDURE — 20610 DRAIN/INJ JOINT/BURSA W/O US: CPT | Mod: RT,S$GLB,, | Performed by: ANESTHESIOLOGY

## 2019-10-28 PROCEDURE — 99214 OFFICE O/P EST MOD 30 MIN: CPT | Mod: 25,S$GLB,, | Performed by: ANESTHESIOLOGY

## 2019-10-28 PROCEDURE — 3078F PR MOST RECENT DIASTOLIC BLOOD PRESSURE < 80 MM HG: ICD-10-PCS | Mod: CPTII,S$GLB,, | Performed by: ANESTHESIOLOGY

## 2019-10-28 PROCEDURE — 73521 XR HIPS BILATERAL 2 VIEW INCL AP PELVIS: ICD-10-PCS | Mod: 26,,, | Performed by: RADIOLOGY

## 2019-10-28 PROCEDURE — 3077F PR MOST RECENT SYSTOLIC BLOOD PRESSURE >= 140 MM HG: ICD-10-PCS | Mod: CPTII,S$GLB,, | Performed by: ANESTHESIOLOGY

## 2019-10-28 PROCEDURE — 3008F BODY MASS INDEX DOCD: CPT | Mod: CPTII,S$GLB,, | Performed by: ANESTHESIOLOGY

## 2019-10-28 PROCEDURE — 3008F PR BODY MASS INDEX (BMI) DOCUMENTED: ICD-10-PCS | Mod: CPTII,S$GLB,, | Performed by: ANESTHESIOLOGY

## 2019-10-28 PROCEDURE — 73521 X-RAY EXAM HIPS BI 2 VIEWS: CPT | Mod: 26,,, | Performed by: RADIOLOGY

## 2019-10-28 PROCEDURE — 99999 PR PBB SHADOW E&M-EST. PATIENT-LVL III: ICD-10-PCS | Mod: PBBFAC,,, | Performed by: ANESTHESIOLOGY

## 2019-10-28 PROCEDURE — 3077F SYST BP >= 140 MM HG: CPT | Mod: CPTII,S$GLB,, | Performed by: ANESTHESIOLOGY

## 2019-10-28 PROCEDURE — 20610 PR DRAIN/INJECT LARGE JOINT/BURSA: ICD-10-PCS | Mod: RT,S$GLB,, | Performed by: ANESTHESIOLOGY

## 2019-10-28 RX ORDER — BUPIVACAINE HYDROCHLORIDE 2.5 MG/ML
3 INJECTION, SOLUTION EPIDURAL; INFILTRATION; INTRACAUDAL
Status: COMPLETED | OUTPATIENT
Start: 2019-10-28 | End: 2019-10-28

## 2019-10-28 RX ORDER — BETAMETHASONE SODIUM PHOSPHATE AND BETAMETHASONE ACETATE 3; 3 MG/ML; MG/ML
6 INJECTION, SUSPENSION INTRA-ARTICULAR; INTRALESIONAL; INTRAMUSCULAR; SOFT TISSUE
Status: DISCONTINUED | OUTPATIENT
Start: 2019-10-28 | End: 2019-10-28

## 2019-10-28 RX ORDER — LIDOCAINE HYDROCHLORIDE 10 MG/ML
3 INJECTION INFILTRATION; PERINEURAL
Status: COMPLETED | OUTPATIENT
Start: 2019-10-28 | End: 2019-10-28

## 2019-10-28 RX ORDER — BETAMETHASONE SODIUM PHOSPHATE AND BETAMETHASONE ACETATE 3; 3 MG/ML; MG/ML
6 INJECTION, SUSPENSION INTRA-ARTICULAR; INTRALESIONAL; INTRAMUSCULAR; SOFT TISSUE
Status: COMPLETED | OUTPATIENT
Start: 2019-10-28 | End: 2019-10-28

## 2019-10-28 RX ORDER — HYDROCODONE BITARTRATE AND ACETAMINOPHEN 7.5; 325 MG/1; MG/1
1 TABLET ORAL 2 TIMES DAILY PRN
Qty: 60 TABLET | Refills: 0 | Status: SHIPPED | OUTPATIENT
Start: 2019-11-01 | End: 2019-12-02 | Stop reason: SDUPTHER

## 2019-10-28 RX ADMIN — BETAMETHASONE SODIUM PHOSPHATE AND BETAMETHASONE ACETATE 6 MG: 3; 3 INJECTION, SUSPENSION INTRA-ARTICULAR; INTRALESIONAL; INTRAMUSCULAR; SOFT TISSUE at 03:10

## 2019-10-28 RX ADMIN — BUPIVACAINE HYDROCHLORIDE 7.5 MG: 2.5 INJECTION, SOLUTION EPIDURAL; INFILTRATION; INTRACAUDAL at 03:10

## 2019-10-28 RX ADMIN — LIDOCAINE HYDROCHLORIDE 3 ML: 10 INJECTION INFILTRATION; PERINEURAL at 03:10

## 2019-10-28 NOTE — TELEPHONE ENCOUNTER
Informed pt we received clearance for her to hold the Plavix 5 days before her procedure. She will take her last dose on Nov 7th and resume that day after the procedure. Pt verbalized understanding.

## 2019-10-28 NOTE — LETTER
October 29, 2019      Cheryl Bradley, NP  [address]  [place] [address]           [facility]  [address] [facility] [place] [address]  Phone: [phone]  Fax: [phone]          Patient: Alessia Nelson   MR Number: [id number]   YOB: 1955   Date of Visit: 10/28/2019       Dear Cheryl Bradley:    Thank you for referring Alessia Nelson to me for evaluation. Attached you will find relevant portions of my assessment and plan of care.    If you have questions, please do not hesitate to call me. I look forward to following Alessia Nelson along with you.    Sincerely,    Sneha Aragon MD    Enclosure  CC:  No Recipients    If you would like to receive this communication electronically, please contact [email or web address] or [phone] to request more information on [employer] Link access.    For providers and/or their staff who would like to refer a patient to [facility], please contact us through our one-stop-shop provider referral line, [facility], at [phone].    If you feel you have received this communication in error or would no longer like to receive these types of communications, please e-mail [email or web address]

## 2019-10-28 NOTE — TELEPHONE ENCOUNTER
"----- Message from Haydee Vu LPN sent at 10/25/2019  3:13 PM CDT -----      ----- Message -----  From: Jovanni Serrano MD  Sent: 10/24/2019   4:49 PM CDT  To: Haydee Vu LPN    My standard clearance - Patient been seen within the last 6 months and if stable and asymptomatic from the cardiovascular standpoint they can be clear for surgery and anesthesia with acceptable cardiac risk. May be at increase risk for complications due to the patient's co-morbidities. In requard to holding the antiplatelet drugs or warfarin / Coumadin (ASA, Plavix /clopidogrel, Pletal /cilostazol, Brilinta /ticagrelor), if no hospitalization for acute coronary syndrome in the past 12 months, can hold for 5-7 days at the discretion of the surgeon. If the patient is on NOAC (Eliquis, Xarelto, Pradaxa) can hold for 1-2 days per surgeon discretion. Certain recommend resumption of these medications ASAP post procedure. If an official "clearance" is needed, then will need an office visit.     Thanks, Dr. Serrano    ----- Message -----  From: Haydee Vu LPN  Sent: 10/24/2019   2:33 PM CDT  To: Jovanni Serrano MD    Please advise.    Thanks.    ----- Message -----  From: Hope Ramos LPN  Sent: 10/24/2019  11:04 AM CDT  To: Zach Baig [facility]    Pt will be having a cysto on 11/13/2019. Is she able to hold Plavix 5 days before the procedure?      "

## 2019-10-28 NOTE — PROGRESS NOTES
Subjective:     Patient ID: Alessia Nelson is a 64 y.o. female.    Chief Complaint: Pain    Consulted by: Lynne Parsons NP     Disclaimer: This note was generated using voice recognition software.  There may be a typographical errors that were missed during proofreading.      HPI:    Alessia Nelson is a 64 y.o. female who presents today with chronic low back pain.  She has a history of 2 lumbar surgeries in the past as well as a cervical surgery.  She reports bilateral low back pain that radiates into her right buttock and the posterior aspect of her right leg to her knee and sometimes to her calf.  She does have numbness in bilateral feet attributed to diabetic peripheral neuropathy. This is separate from her low back symptoms.   This pain is described in detail below.    Of note, she has had 4 falls, one from her BP dropping, one while she was asleep, and two others of unknown causes.    She carries a diagnosis of fibromyalgia, but she states that she does not believe she has this.    Aggravating factors:  Sitting, standing, walking, bending/twisting, lifting, exercise    Mitigating factors:  Rest, lying down, medication    Previously seeing: Dr. Cardozo, Kyler Watson PA-C, Dr. Brent Lewis    Interval History (6/25/2019):  She returns today for follow up.  She reports that she has not gotten any relief from the radiofrequency ablation yet.  Nothing has been helpful for the pain.    Outside records from [facility] [place]:  Office visit from 04/18/2019:  Show ongoing treatment for her low back pain. Makes a note of a recent SI joint injection with no benefit.  Makes note of an EMG which shows neuropathy.  Shows ongoing treatment with Norco 7.5/325 mg twice daily as needed with no issues.  Other office visits from 02/26/2019, 12/20/2018, 10/25/2018 all show stable treatment with Norco 7.5/325 mg twice daily as needed with no issues.  One office visit from 11/2014 show treatment with Norco 10/325 mg  with no issues    Interval History (7/22/2019):  She returns today for follow up.  She reports that the right GT bursa injection provided greater than 50% benefit at 1st, lasting until this week.  Norco has been helpful for the pain, but methocarbamol 2 tablets at nighttime has not been as helpful.  She has not been able to get the foam roller that we discussed at our last visit.  She wonders if a TENS unit can be helpful.  She saw Dr. Yuan, who ordered a bone scan to look at a possible fracture at L5 near the L5/S1 facet joint.  She has not had this done yet.    Of note, she continues to have difficulty sleeping.  She admits to using her tablet in the bed.  She reports that she was sleeping better before the amitriptyline and lorazepam were stopped; however, she does not wish to restart the that she has not had any falls since stopping these.    Interval History (8/20/2019):  She returns today for follow up.  She reports that she is having worsening stomach pain. This is actually her primary pain complaint today.  This is being treated up by Dr. Fraga.  Norco has been helpful for her low back and knee pain. She continues to take gabapentin and methocarbamol as well with benefit.  Despite this, she continues to have severe pain that is limiting her functioning.  She asks about the report from her bone scan.    Of note, her right bursa is hurting today.  She asks for repeat injection. This was very helpful in June    Interval History (9/23/2019):  She returns today for follow up.  She reports that she canceled the spinal cord stimulator trial because she is nervous about injection on her hardware.  She reports that her right leg/hip continue to be the most painful placed today.  Overall, she reports that nothing helps although, the Norco takes the edge off.    Interval History (10/28/2019):  She returns today for follow up.  She reports that the right hip injection did not provide great relief.  She continues  "to have severe right hip and low back pain.  She wonders if this is due to her knee pain causing her to alter her gait.  She would like to proceed with the right knee injection today.  She also wonders if she can get a new set of x-rays because she had a fall.  She wonders again if she "may have broken something."    Physical Therapy:  Yes, just finished 2 weeks, she is doing the HEP (stretching) most days.      Non-pharmacologic Treatment:     · Ice/Heat: Heat is helpful (her dog lies on her back)  · TENS: Tried at the chiropractor  · Massage: Yes, in the past, helpful  · Chiropractic care:  Yes, in the past, helpful  · Acupuncture: She is interested in trying this.  · Other: Uses a body pillow for sleep         Pain Medications:         · Currently taking: Tylenol 9996-8279 BID PRN (takes it for before the Norco), Norco 7.5/325 mg BID PRN (she tries to limit this as much as possible), methocarbamol 750-1500 mg QHS, gabapentin 400 mg BID, trazodone 100 mg QHS, Wellbutrin, Lexapro    · Has tried in the past:    · Opioids: Oxycodone (doesn't like)  · NSAIDS: Celebrex (caused diarrhea)  · Tylenol: Tylenol 3947-3737   · Muscle relaxants: tizanidine (caused SE of not remembering and seeing things)  · TCAs: Amitriptyline (stopped due to falls)  · SNRIs: Not tried  · Anticonvulsants: On gabapentin  · topical creams: Lotion for nighttime, bath oil  · Other: Lorazepam (stopped this)    Blood thinners: Plavix for     Interventional Therapies:   · L4/5 Epidural steroid injection: For many years.  She is unsure of the last one  · Right SI joint: No benefit  · Right L5-S3 MBB: positive  · 06/10/2019:  Right L5-S3 RFA:  50% benefit  · 06/25/2019:  Right greater trochanteric bursa injection:  50% benefit    Relevant Surgeries:   · 2006: Lumbar surgery  · 2007: Lumbar surgery  · 2012: Lumbar surgery, PISF  · 2006: Cervical surgery    Affecting sleep? Yes, she cannot sleep due to "brain not turning off"    Affecting daily " activities? Yes, she is unable to mop, cook, or do most activities    Depressive symptoms? Yes, she has been diagnosed with depression          · SI/HI? No    Work status: Criminal Justice, disabled due to pain    Prescription Monitoring Program database:  Reviewed and consistent with medication use as prescribed.    Last 3 PDI Scores 10/28/2019 9/23/2019 8/20/2019   Pain Disability Index (PDI) 30 48 70       Opioid Risk Score       Value Time User    Opioid Risk Score  4 5/21/2019  3:57 PM Clare Alfonso MA          GENERAL:  Positive for fatigue.  No weight loss, malaise or fevers.  HEENT:   No recent changes in vision or hearing  NECK:  Negative for lumps, no difficulty with swallowing.  RESPIRATORY:  Positive for cough.  Negative for wheezing or shortness of breath, patient denies any recent URI.  CARDIOVASCULAR:  Negative for chest pain, leg swelling or palpitations.  GI:  Positive for diarrhea.  Negative for abdominal discomfort, blood in stools or black stools or change in bowel habits.  MUSCULOSKELETAL:  See HPI.  SKIN:  Positive for color change, texture change.  Negative for lesions, rash, and itching.  PSYCH:  Positive for anxiety.  No other mood disorder or recent psychosocial stressors.    HEMATOLOGY/LYMPHOLOGY:  Positive for easy bruising.  Negative for prolonged bleeding or swollen nodes.    ENDO:  Positive for hypothyroidism and diabetes.    NEURO:   Positive for headaches, head trauma (due to recent fall), loss of balance, difficulty sleeping.  No history of syncope, paralysis, seizures or tremors.  All other reviewed and negative other than HPI.          Past Medical History:   Diagnosis Date    Acute pancreatitis     CAD (coronary artery disease)     CHF (congestive heart failure)     COPD (chronic obstructive pulmonary disease)     Depression     Diverticulosis     GERD (gastroesophageal reflux disease)     Hypertension     Thyroid disease        Past Surgical History:   Procedure  Laterality Date    ADRENAL GLAND SURGERY      CHOLECYSTECTOMY      COLONOSCOPY      CORONARY STENT PLACEMENT      HERNIA REPAIR      HYSTERECTOMY      INCISIONAL HERNIA REPAIR      INJECTION OF ANESTHETIC AGENT AROUND NERVE Right 5/27/2019    Procedure: RIGHT L5-S3 MEDIAL BRANCH BLOCKS;  Surgeon: Sneha Aragon MD;  Location: [facility] OR;  Service: Pain Management;  Laterality: Right;  **DO NOT STOP PLAVIX**    instestine      PARATHYROID GLAND SURGERY      3 surgeries    RADIOFREQUENCY ABLATION Right 6/10/2019    Procedure: Radiofrequency Ablation - RIGHT L3-5 RADIOFREQUENCY ABLATION WITH DrikYARD COOLIEF THERMAL SYSTEM;  Surgeon: Sneha Aragon MD;  Location: [facility] OR;  Service: Pain Management;  Laterality: Right;  **HOLD PLAVIX x 7 DAYS PRIOR**    UPPER GASTROINTESTINAL ENDOSCOPY         Review of patient's allergies indicates:   Allergen Reactions    Aspirin     Lortab [hydrocodone-acetaminophen] Itching    Phenergan [promethazine]     Promethazine hcl        Current Outpatient Medications   Medication Sig Dispense Refill    acetaminophen (TYLENOL) 325 MG tablet Take 2 tablets (650 mg total) by mouth every 6 (six) hours as needed (or equal to 101 degree F).      allopurinol (ZYLOPRIM) 300 MG tablet       baclofen (LIORESAL) 10 MG tablet Take 1 tablet (10 mg total) by mouth 2 (two) times daily. 60 tablet 6    benazepril (LOTENSIN) 10 MG tablet Take 1 tablet (10 mg total) by mouth once daily. 90 tablet 3    buPROPion (WELLBUTRIN XL) 150 MG TB24 tablet Take 1 tablet (150 mg total) by mouth once daily. 90 tablet 3    clopidogrel (PLAVIX) 75 mg tablet Take 1 tablet (75 mg total) by mouth once daily. 14 tablet 0    colestipol (COLESTID) 1 gram Tab       escitalopram (LEXAPRO) 20 MG tablet Take 20 mg by mouth once daily.      gabapentin (NEURONTIN) 400 MG capsule Take 1 capsule (400 mg total) by mouth 4 (four) times daily. 360 capsule 3    glycopyrrolate (ROBINUL) 1 mg Tab TK 1 T PO BID 90  tablet 3    levothyroxine (SYNTHROID) 50 MCG tablet Take 50 mcg by mouth once daily.      metoprolol tartrate (LOPRESSOR) 100 MG tablet Take 1 tablet (100 mg total) by mouth 2 (two) times daily. 90 tablet 3    ondansetron (ZOFRAN) 4 MG tablet Take 2 tablets (8 mg total) by mouth every 8 (eight) hours as needed for Nausea. 100 tablet 0    pantoprazole (PROTONIX) 40 MG tablet Take 1 tablet (40 mg total) by mouth once daily. 90 tablet 3    potassium chloride SA (K-DUR,KLOR-CON) 20 MEQ tablet Take 1 tablet (20 mEq total) by mouth once daily. 30 tablet 3    rosuvastatin (CRESTOR) 20 MG tablet Take 1 tablet (20 mg total) by mouth once daily. 90 tablet 3    traZODone (DESYREL) 100 MG tablet TAKE 1 TABLET (100 MG TOTAL) BY MOUTH EVERY EVENING. 90 tablet 3     No current facility-administered medications for this visit.        Family History   Problem Relation Age of Onset    Stroke Maternal Grandmother     Cancer Maternal Grandfather     Stroke Mother     Heart disease Father        Social History     Socioeconomic History    Marital status: Single     Spouse name: Not on file    Number of children: 0    Years of education: Not on file    Highest education level: Not on file   Occupational History    Not on file   Social Needs    Financial resource strain: Not on file    Food insecurity:     Worry: Not on file     Inability: Not on file    Transportation needs:     Medical: Not on file     Non-medical: Not on file   Tobacco Use    Smoking status: Current Every Day Smoker     Packs/day: 3.00     Years: 15.00     Pack years: 45.00    Smokeless tobacco: Never Used   Substance and Sexual Activity    Alcohol use: No    Drug use: No    Sexual activity: Not Currently   Lifestyle    Physical activity:     Days per week: Not on file     Minutes per session: Not on file    Stress: Not on file   Relationships    Social connections:     Talks on phone: Not on file     Gets together: Not on file     Attends  "Taoism service: Not on file     Active member of club or organization: Not on file     Attends meetings of clubs or organizations: Not on file     Relationship status: Not on file   Other Topics Concern    Not on file   Social History Narrative    Not on file       Objective:     Vitals:    10/28/19 1456   BP: (!) 156/76   Pulse: (!) 56   Temp: 97.9 °F (36.6 °C)   TempSrc: Oral   SpO2: 95%   Weight: 86.6 kg (191 lb)   Height: 5' 7" (1.702 m)   PainSc:   6   PainLoc: Knee       GEN:  Well developed, well nourished.  No acute distress. No pain behavior.  HEENT:  No trauma.  Mucous membranes moist.  Nares patent bilaterally.  PSYCH: Normal affect. Thought content appropriate.  CHEST:  Breathing symmetric.  No audible wheezing.  ABD: Soft, non-distended.  SKIN:  Warm, pink, dry.  No rash on exposed areas.    EXT:  No cyanosis, clubbing, or edema.  No color change or changes in nail or hair growth.  NEURO/MUSCULOSKELETAL:  Fully alert, oriented, and appropriate. Speech normal kevin. No cranial nerve deficits.   Gait: Antalgic.    SI joint/hip:  Positive MIKEY and FADIR on the right  TTP over right GTB    Right knee  Inspection: No erythema, swelling, or redness  ROM: Decreased  Palpation: TTP over medial > lateral joint space.  Positive crepitus. No patellar tendon tenderness   No pes anserine tenderness. No patellofemoral tendon tenderness.  No instability on exam.    Previous physical exam:  Present trendelenburg sign bilaterally.   Motor Strength: 5/5 motor strength throughout lower extremities.   Sensory: Decreased sensation in bilateral soles of her feet equally.  Increased sensation in a right L5.   Reflexes:  1+ and symmetric throughout.  Downgoing Babinski's bilaterally.  No clonus or spasticity.  L-Spine:  Decreased ROM with pain on flexion. Minimal pain with axial/facet loading bilaterally.  Positive SLR on the left for pain in an S1  SI Joint/Hip: Positive MIKEY on the right.  Negative MIKEY on the left " and FADIR bilaterally.  TTP over lumbar paraspinals, right SI joint   No TTP over right lumbar paraspinals, right SI joint.      Imaging:      The imaging studies listed below were independently reviewed by me, and I agree with the findings as documented below.     Narrative     EXAMINATION:  XR LUMBAR SPINE 5 VIEW WITH FLEX AND EXT    CLINICAL HISTORY:  axial low back pain;    TECHNIQUE:  Seven views of the lumbar spine were obtained, with AP, lateral, lumbosacral lateral, both oblique, and lateral flexion and extension radiographs submitted.    COMPARISON:  No prior conventional radiographic examinations of the lumbar spine are currently available for comparison purposes.    FINDINGS:  Postoperative changes are identified, including laminectomies at L4 and L5 and a posterior fusion at L4-5, with bilateral pedicle screws seen in both L4 and L5 attached to vertical stabilization bars, and a disc implant/spacer at L4-5 observed.  Minimal anterolisthesis of L4 in relation L5 is identified, alignment at other levels appearing unremarkable.  No significant translational instability at any thoracolumbar level is seen on the dynamic weight- bearing flexion/extension images.  Vertebral body heights are normally maintained, without significant compression deformity at any level.  No significant disc narrowing.  Multilevel marginal vertebral endplate spurring is seen throughout the thoracolumbar spine.  Vertebral endplates are well defined.  No osseous destructive process.  Prominent aortoiliac calcification is seen, without definable aneurysm, and there are surgical clips in the upper abdomen to both sides of midline.      Impression       1. Postoperative changes of L4 and L5 laminectomy and instrumented spinal fusion at L4-5.  2. Multilevel marginal vertebral endplate spurring.      Electronically signed by: Maurilio Myles MD  Date: 07/16/2019  Time: 14:02       Narrative     EXAMINATION:  MRI LUMBAR SPINE WITHOUT  CONTRAST    CLINICAL HISTORY:  lumbar stenosis; Postlaminectomy syndrome, not elsewhere classified    TECHNIQUE:  Multiplanar, multisequence MR images were acquired from the thoracolumbar junction to the sacrum without the administration of contrast.    COMPARISON:  MRI lumbar spine 08/15/2018.    FINDINGS:  Prior posterior fusion and dorsal decompression from L4-L5.  Interbody cage in place.  Normal alignment.  The remaining lumbar vertebral bodies are normal in height and alignment.  No acute fracture.  No marrow edema.    Visualized distal thoracic spinal cord is normal contour and signal intensity.  Conus medullaris terminates at L1-L2.    L1-L2: Mild facet joint hypertrophy.  No central spinal canal or foraminal stenosis.    L2-L3: Broad-based disc bulging with facet joint hypertrophy results in mild bilateral neural foraminal narrowing.  No central spinal canal stenosis.    L3-L4: Broad-based disc bulging with facet joint hypertrophy and ligamentum flavum thickening results in mild central spinal canal stenosis with moderate bilateral neural foraminal narrowing.  Narrowed AP canal diameter measures 11.0 mm.    L4-L5: Prior posterior fusion and dorsal decompression with bilateral laminectomy.  Endplate osteophytosis with facet joint hypertrophy results in severe narrowing of the right neural foramen and moderate narrowing the left neural foramen.  No central spinal canal stenosis.    L5-S1: Previous dorsal decompression with bilateral laminectomy.  Broad-based disc bulging slightly eccentric to the right with facet joint hypertrophy results in severe narrowing of the right neural foramen and moderate narrowing the left neural foramen.  No central spinal canal stenosis.      Impression       1. Prior posterior fusion and dorsal decompression from L4-L5.  2. Degenerative disc disease at L2-L3 resulting in mild bilateral neural foraminal narrowing.  3. Degenerative disc disease at L3-L4 resulting in mild central  spinal canal stenosis with moderate bilateral neural foraminal narrowing.  4. Multilevel degenerative disc disease from L4 through S1 resulting in severe narrowing of the right neural foramen and moderate narrowing the left neural foramen.      Electronically signed by: Jesús Dawson  Date: 05/23/2019  Time: 10:10     CT Abd/Pelvis shows no evidence of significant spinal canal stenosis in the lower thoracic spine      Assessment:     Encounter Diagnoses   Name Primary?    Chronic pain disorder Yes    Chronic right hip pain     Greater trochanteric bursitis of right hip     Sacroiliac joint pain     Chronic pain of right knee        Plan:     Alessia was seen today for follow-up.    Diagnoses and all orders for this visit:    Chronic pain disorder  -     X-Ray Hips Bilateral 2 View Incl AP Pelvis; Future  -     HYDROcodone-acetaminophen (NORCO) 7.5-325 mg per tablet; Take 1 tablet by mouth 2 (two) times daily as needed for Pain.    Chronic right hip pain  -     X-Ray Hips Bilateral 2 View Incl AP Pelvis; Future  -     HYDROcodone-acetaminophen (NORCO) 7.5-325 mg per tablet; Take 1 tablet by mouth 2 (two) times daily as needed for Pain.    Greater trochanteric bursitis of right hip  -     X-Ray Hips Bilateral 2 View Incl AP Pelvis; Future  -     HYDROcodone-acetaminophen (NORCO) 7.5-325 mg per tablet; Take 1 tablet by mouth 2 (two) times daily as needed for Pain.    Sacroiliac joint pain  -     X-Ray Hips Bilateral 2 View Incl AP Pelvis; Future  -     HYDROcodone-acetaminophen (NORCO) 7.5-325 mg per tablet; Take 1 tablet by mouth 2 (two) times daily as needed for Pain.    Chronic pain of right knee  -     lidocaine HCL 10 mg/ml (1%) injection 3 mL  -     bupivacaine (PF) 0.25% (2.5 mg/ml) injection 7.5 mg  -     Discontinue: betamethasone acetate-betamethasone sodium phosphate injection 6 mg  -     betamethasone acetate-betamethasone sodium phosphate injection 6 mg  -     HYDROcodone-acetaminophen (NORCO) 7.5-325  mg per tablet; Take 1 tablet by mouth 2 (two) times daily as needed for Pain.         She presents today with a longstanding chronic pain history with several recent medical issues that are complicating the picture.  We will work to control her pain with as few sedating medications as possible.  It is reasonable to think that these may have had something to do with her falls given her symptoms of amnesia surrounding the event, lightheadedness, and confusion.      We discussed the assessment and recommendations.  All available images were reviewed. We discussed the disease process, prognosis, treatment plan, and risks and benefits. The patient is aware of the risks and benefits of the medications being prescribed, common side effects, and proper usage. The following is the plan we agreed on:     1. X-rays for hip/SI joint to reassess after recent fall  2. Right knee injection today as below. The procedure was explained in detail, including risks, benefits, and alternatives.  All questions answered.  Consent obtained today.  3. We had an extensive discussion regarding our options moving forward.  We again discussed spinal cord stimulation.  Will discuss this again in her next visit  4. Can repeat right L5-S3 RFA PRN.  Can also consider diagnostic right sacroiliac joint injection  5. Continue the Norco 7.5/325 mg twice daily as needed.  Currently, the benefits outweigh the risks.  We had a long discussion regarding the risks and benefits of narcotic therapy.  We will work to minimize these as much as possible.  [facility] reviewed and is appropriate.  Opioid contract signed today.  UDS done 06/25/2019:  Consistent  6. We had an extensive discussion regarding the expected time course for PT for chronic pain vs after a surgery.  Specifically, we discussed that PT for chronic pain almost always causes a worsening of pain before any improvement, which is generally not seen for 3-6 months, in which time she will likely  already be home doing home exercises given that a formal course of PT generally lasts anywhere from 6-12 weeks.  7. Recommend she obtain a foam roller for home use for her bursitis  8. Recommend that she obtain a TENS unit for home use  9. She would be a good candidate for FRP in the future  10. RTC in 4 weeks or sooner if needed.    Date of Procedure: 10/28/2019    Procedure: Right knee intra-articular injection    Pre-op diagnosis: Right knee pain    Post-op diagnosis: Same     Physician: Dr. Sneha Aragon     Assistant: None    Anesthestia: Local    EBL: None    Specimens: None    All medications, allergies, and relevant histories were reviewed. No recent antibiotics or infections.  A time-out was taken to verify the correct patient, procedure, laterality, and appropriate medications/allergies.    Intra-articular knee injection:  Right    Pt was advised, consented and had questions answered prior to proceeding.  Pt was placed in the sitting position with the right knee flexed.  Sterile prep over the knee.  Palpation was used to located the knee joint space. A 27g Needle was used to administer 2cc of bicarbonated 1% lidocaine at needle insertion site.  A 22G 1.5 inch needle was advanced from the lateral angle below the patella into the anterior portion of the knee joint.  A mixture of 3 cc of 0.25% bupivacaine with 6 mg betamethasone was injected after negative aspiration.  There was good spread throughout the joint under ultrasound. Needle was removed and a bandage was applied.    The patient tolerated the procedure well and was discharged in excellent condition.  No complications noted.     Sneha Aragon MD  10/28/2019     The above plan and management options were discussed at length with patient. Patient is in agreement with the above and verbalized understanding. It will be communicated with the referring physician via electronic record, fax, or mail.

## 2019-10-29 NOTE — PROGRESS NOTES
"Chief Complaint  Chief Complaint   Patient presents with    Urinary Tract Infection     uti fu        HPI:  Alessia Nelson is a 64 y.o. female with medical diagnoses as listed and reviewed within the medical history and problem list that presents as a consult from Cheryl Bradley. Pt has been treated for recurrent UTI. She has had a couple of UA with reflex to cultures that did not trigger. Therefore, culture has not been done. She reports that she has been having pressure and fullness, burning with urination and hesitancy with urination. States, "my urine feels thicker than normal". She denies hematuria or urgency. She has been on Cipro but completed now. She has been on multiple antibiotics over the last several months and has developed a yeast infection with itching and thick vaginal discharge. She reports that she does have some dribbling of urine at times and her urine will appear darker at times. She feels that she empties her bladder well normally. She has no history of bladder cancer or kidney problems. She does have history of hypertension, hypothyroid, and gout. She also sees  for pain management. Pt does have renal cysts and perinephric inflammation on CT scan from 10/3/19. She agrees to examination.       PAST MEDICAL HISTORY:  Past Medical History:   Diagnosis Date    Acute pancreatitis     CAD (coronary artery disease)     CHF (congestive heart failure)     COPD (chronic obstructive pulmonary disease)     Depression     Diverticulosis     GERD (gastroesophageal reflux disease)     Hypertension     Thyroid disease        PAST SURGICAL HISTORY:  Past Surgical History:   Procedure Laterality Date    ADRENAL GLAND SURGERY      CHOLECYSTECTOMY      COLONOSCOPY      CORONARY STENT PLACEMENT      HERNIA REPAIR      HYSTERECTOMY      INCISIONAL HERNIA REPAIR      INJECTION OF ANESTHETIC AGENT AROUND NERVE Right 5/27/2019    Procedure: RIGHT L5-S3 MEDIAL BRANCH BLOCKS;  Surgeon: " Sneha Aragon MD;  Location: [facility] OR;  Service: Pain Management;  Laterality: Right;  **DO NOT STOP PLAVIX**    instestine      PARATHYROID GLAND SURGERY      3 surgeries    RADIOFREQUENCY ABLATION Right 6/10/2019    Procedure: Radiofrequency Ablation - RIGHT L3-5 RADIOFREQUENCY ABLATION WITH HALYARD COOLIEF THERMAL SYSTEM;  Surgeon: Sneha Aragon MD;  Location: [facility] OR;  Service: Pain Management;  Laterality: Right;  **HOLD PLAVIX x 7 DAYS PRIOR**    UPPER GASTROINTESTINAL ENDOSCOPY         SOCIAL HISTORY:  Social History     Socioeconomic History    Marital status: Single     Spouse name: Not on file    Number of children: 0    Years of education: Not on file    Highest education level: Not on file   Occupational History    Not on file   Social Needs    Financial resource strain: Not on file    Food insecurity:     Worry: Not on file     Inability: Not on file    Transportation needs:     Medical: Not on file     Non-medical: Not on file   Tobacco Use    Smoking status: Current Every Day Smoker     Packs/day: 3.00     Years: 15.00     Pack years: 45.00    Smokeless tobacco: Never Used   Substance and Sexual Activity    Alcohol use: No    Drug use: No    Sexual activity: Not Currently   Lifestyle    Physical activity:     Days per week: Not on file     Minutes per session: Not on file    Stress: Not on file   Relationships    Social connections:     Talks on phone: Not on file     Gets together: Not on file     Attends [employer] service: Not on file     Active member of club or organization: Not on file     Attends meetings of clubs or organizations: Not on file     Relationship status: Not on file   Other Topics Concern    Not on file   Social History Narrative    Not on file       FAMILY HISTORY:  Family History   Problem Relation Age of Onset    Stroke Maternal Grandmother     Cancer Maternal Grandfather     Stroke Mother     Heart disease Father        ALLERGIES AND MEDICATIONS:  updated and reviewed.  Review of patient's allergies indicates:   Allergen Reactions    Aspirin     Lortab [hydrocodone-acetaminophen] Itching    Phenergan [promethazine]     Promethazine hcl      Current Outpatient Medications   Medication Sig Dispense Refill    acetaminophen (TYLENOL) 325 MG tablet Take 2 tablets (650 mg total) by mouth every 6 (six) hours as needed (or equal to 101 degree F).      allopurinol (ZYLOPRIM) 300 MG tablet       baclofen (LIORESAL) 10 MG tablet Take 1 tablet (10 mg total) by mouth 2 (two) times daily. 60 tablet 6    benazepril (LOTENSIN) 10 MG tablet Take 1 tablet (10 mg total) by mouth once daily. 90 tablet 3    buPROPion (WELLBUTRIN XL) 150 MG TB24 tablet Take 1 tablet (150 mg total) by mouth once daily. 90 tablet 3    clopidogrel (PLAVIX) 75 mg tablet Take 1 tablet (75 mg total) by mouth once daily. 14 tablet 0    colestipol (COLESTID) 1 gram Tab       escitalopram (LEXAPRO) 20 MG tablet Take 20 mg by mouth once daily.      gabapentin (NEURONTIN) 400 MG capsule Take 1 capsule (400 mg total) by mouth 4 (four) times daily. 360 capsule 3    glycopyrrolate (ROBINUL) 1 mg Tab TK 1 T PO BID 90 tablet 3    levothyroxine (SYNTHROID) 50 MCG tablet Take 50 mcg by mouth once daily.      metoprolol tartrate (LOPRESSOR) 100 MG tablet Take 1 tablet (100 mg total) by mouth 2 (two) times daily. 90 tablet 3    ondansetron (ZOFRAN) 4 MG tablet Take 2 tablets (8 mg total) by mouth every 8 (eight) hours as needed for Nausea. 100 tablet 0    pantoprazole (PROTONIX) 40 MG tablet Take 1 tablet (40 mg total) by mouth once daily. 90 tablet 3    potassium chloride SA (K-DUR,KLOR-CON) 20 MEQ tablet Take 1 tablet (20 mEq total) by mouth once daily. 30 tablet 3    rosuvastatin (CRESTOR) 20 MG tablet Take 1 tablet (20 mg total) by mouth once daily. 90 tablet 3    [START ON 11/1/2019] HYDROcodone-acetaminophen (NORCO) 7.5-325 mg per tablet Take 1 tablet by mouth 2 (two) times daily as needed  "for Pain. 60 tablet 0    ranitidine (ZANTAC) 150 MG tablet Take 150 mg by mouth 2 (two) times daily.      traZODone (DESYREL) 100 MG tablet TAKE 1 TABLET (100 MG TOTAL) BY MOUTH EVERY EVENING. 90 tablet 3     No current facility-administered medications for this visit.          ROS  Review of Systems   Constitutional: Positive for fatigue. Negative for activity change, chills and fever.   Respiratory: Negative for shortness of breath.    Cardiovascular: Negative for chest pain and palpitations.   Gastrointestinal: Negative for abdominal pain, nausea and vomiting.   Genitourinary: Positive for difficulty urinating, dysuria, frequency, pelvic pain and vaginal discharge. Negative for decreased urine volume, flank pain, hematuria, menstrual problem, urgency and vaginal bleeding.   Musculoskeletal: Positive for arthralgias, back pain, myalgias and neck pain. Negative for gait problem.   Skin: Negative for color change, rash and wound.   Neurological: Positive for headaches. Negative for dizziness and syncope.   Psychiatric/Behavioral: Negative for agitation and dysphoric mood. The patient is not nervous/anxious.            PHYSICAL EXAM  Vitals:    10/24/19 1034   BP: (!) 142/72   Pulse: (!) 50   Resp: 16   Temp: 98 °F (36.7 °C)   TempSrc: Oral   Weight: 86.6 kg (191 lb)   Height: 5' 7" (1.702 m)    Body mass index is 29.91 kg/m².  Weight: 86.6 kg (191 lb)   Height: 5' 7" (170.2 cm)       Physical Exam   Constitutional: She is oriented to person, place, and time. She appears well-developed and well-nourished.   HENT:   Head: Normocephalic.   Eyes: Pupils are equal, round, and reactive to light.   Neck: Normal range of motion.   Cardiovascular: Normal rate, S1 normal and S2 normal.   Pulmonary/Chest: Effort normal.   Abdominal: Soft. Normal appearance. She exhibits no distension. There is no tenderness.   Genitourinary:   Genitourinary Comments: Bladder scan 150 ml. Pt did not void.   Repeat PVR 8 ml. She did not catch " urine for urinalysis.   Minimal stress incontinence. No cystocele. Questionable stenosis of urethra.   Musculoskeletal: Normal range of motion.   Neurological: She is alert and oriented to person, place, and time.   Skin: Skin is warm and dry. Capillary refill takes less than 2 seconds.   Psychiatric: She has a normal mood and affect. Her speech is normal and behavior is normal. Thought content normal. Cognition and memory are normal.   Vitals reviewed.        Health Maintenance       Date Due Completion Date    Hepatitis C Screening 1955 ---    TETANUS VACCINE 10/05/1974 ---    Mammogram 10/05/1996 ---    Shingles Vaccine (1 of 2) 10/05/2006 ---    Influenza Vaccine (1) 09/01/2019 2/14/2019    Urine Drug Screen 06/25/2020 6/25/2019    Lipid Panel 07/24/2020 7/24/2019    LDCT Lung Screen 10/03/2020 10/3/2019    Aspirin/Antiplatelet Therapy 10/28/2020 10/28/2019    Colonoscopy 02/05/2025 2/5/2015               Assessment & Plan    Alessia was seen today for urinary tract infection.    Diagnoses and all orders for this visit:    Vaginal yeast infection  -     fluconazole (DIFLUCAN) 150 MG Tab; Take 1 tablet (150 mg total) by mouth once daily. for 3 days    Dysuria  -     POCT Bladder Scan    Hesitancy  -     POCT Bladder Scan    Recurrent UTI  -     POCT Bladder Scan    - pt did not catch urine for analysis.   - Schedule for cystoscopy 11/13. Pt has had recent imaging so will let  review and see if US needed.     Follow-up: Follow up in about 4 weeks (around 11/21/2019).      Risks, benefits, and side effects were discussed with the patient. All questions were answered to the fullest satisfaction of the patient, and pt verbalized understanding and agreement to treatment plan. Pt was to call with any new or worsening symptoms, or present to the ER.

## 2019-10-31 ENCOUNTER — PATIENT OUTREACH (OUTPATIENT)
Dept: ADMINISTRATIVE | Facility: HOSPITAL | Age: 64
End: 2019-10-31

## 2019-10-31 NOTE — LETTER
October 31, 2019    Alessia Nelson  [address] [place] [address]             [facility] [address] [facility] [address]  Phone: [phone] Dear Nancy [facility] is committed to your overall health and would like to ensure that you are up to date on your recommended test and/or procedures.   Cheryl Bradley NP  has found that your chart shows you may be due for the following:    Health Maintenance Due   Topic Date Due    Hepatitis C Screening  1955    TETANUS VACCINE  10/05/1974    Mammogram  10/05/1996    Shingles Vaccine (1 of 2) 10/05/2006    Influenza Vaccine (1) 09/01/2019     If you have had any of the above done at another facility, please let us know so that we may obtain copies from that facility.  If you have a copy of these records, please provide a copy for us to scan into your chart.  You are welcome to request that the report be faxed to us at  ([phone]).     Otherwise, please schedule these appointments at your earliest convenience by calling [phone] or going to [email or web address].    If you have an upcoming scheduled appointment for the item above, please disregard this letter.    Sincerely,  Your [facility]  ANUM Hughes L.P.N. Clinical Care Coordinator  [address] [place] [address], [place] [address] [phone]

## 2019-11-12 ENCOUNTER — ANESTHESIA EVENT (OUTPATIENT)
Dept: SURGERY | Facility: HOSPITAL | Age: 64
End: 2019-11-12
Payer: MEDICARE

## 2019-11-12 ENCOUNTER — HOSPITAL ENCOUNTER (OUTPATIENT)
Dept: PREADMISSION TESTING | Facility: HOSPITAL | Age: 64
Discharge: HOME | End: 2019-11-12
Attending: UROLOGY
Payer: MEDICARE

## 2019-11-12 ENCOUNTER — TELEPHONE (OUTPATIENT)
Dept: ENDOSCOPY | Facility: HOSPITAL | Age: 64
End: 2019-11-12

## 2019-11-12 PROCEDURE — 99900103 DSU ONLY-NO CHARGE-INITIAL HR (STAT)

## 2019-11-12 NOTE — ANESTHESIA PREPROCEDURE EVALUATION
11/12/2019  Alessia Nelson is a 64 y.o., female.    Pre-op Assessment    I have reviewed the Patient Summary Reports.    I have reviewed the Nursing Notes.   I have reviewed the Medications.     Review of Systems  Anesthesia Hx:  No problems with previous Anesthesia  Neg history of prior surgery. Denies Family Hx of Anesthesia complications.   Denies Personal Hx of Anesthesia complications.   Social:  Smoker    Hematology/Oncology:  Hematology Normal   Oncology Normal     EENT/Dental:EENT/Dental Normal   Cardiovascular:   Hypertension, well controlled CAD asymptomatic CABG/stent  CHF    Pulmonary:   COPD, mild    Renal/:  Renal/ Normal     Hepatic/GI:   GERD, poorly controlled    Musculoskeletal:   Arthritis     Neurological:   TIA, Neuromuscular Disease,    Endocrine:   Hypothyroidism    Dermatological:  Skin Normal    Psych:   depression          Physical Exam  General:  Well nourished    Airway/Jaw/Neck:  Airway Findings: Mouth Opening: Normal Tongue: Normal  General Airway Assessment: Adult  Mallampati: II  TM Distance: 4 - 6 cm        Eyes/Ears/Nose:  EYES/EARS/NOSE FINDINGS: Normal   Dental:  Dental Findings: Upper Dentures, Lower Dentures   Chest/Lungs:  Chest/Lungs Clear    Heart/Vascular:  Heart Findings: Normal Heart murmur: negative Vascular Findings: Normal    Abdomen:  Abdomen Findings: Normal    Musculoskeletal:  Musculoskeletal Findings: Normal   Skin:  Skin Findings: Normal    Mental Status:  Mental Status Findings: Normal        Anesthesia Plan  Type of Anesthesia, risks & benefits discussed:  Anesthesia Type:  general  Patient's Preference:   Intra-op Monitoring Plan: standard ASA monitors  Intra-op Monitoring Plan Comments:   Post Op Pain Control Plan:   Post Op Pain Control Plan Comments:   Induction:   IV  Beta Blocker:  Patient is not currently on a Beta-Blocker (No further  documentation required).       Informed Consent: Patient understands risks and agrees with Anesthesia plan.  Questions answered. Anesthesia consent signed with patient.  ASA Score: 3     Day of Surgery Review of History & Physical:    H&P update referred to the provider.

## 2019-11-12 NOTE — TELEPHONE ENCOUNTER
----- Message from Cassia Gaspar sent at 11/12/2019  1:26 PM CST -----  Contact: pt#[phone]  Pt is calling to reschedule procedure. Please call

## 2019-11-13 ENCOUNTER — TELEPHONE (OUTPATIENT)
Dept: ENDOSCOPY | Facility: HOSPITAL | Age: 64
End: 2019-11-13

## 2019-11-13 ENCOUNTER — HOSPITAL ENCOUNTER (OUTPATIENT)
Facility: HOSPITAL | Age: 64
Discharge: HOME | End: 2019-11-13
Attending: UROLOGY | Admitting: UROLOGY
Payer: MEDICARE

## 2019-11-13 ENCOUNTER — ANESTHESIA (OUTPATIENT)
Dept: SURGERY | Facility: HOSPITAL | Age: 64
End: 2019-11-13
Payer: MEDICARE

## 2019-11-13 VITALS
SYSTOLIC BLOOD PRESSURE: 134 MMHG | DIASTOLIC BLOOD PRESSURE: 68 MMHG | BODY MASS INDEX: 29.03 KG/M2 | WEIGHT: 185 LBS | HEART RATE: 57 BPM | RESPIRATION RATE: 22 BRPM | TEMPERATURE: 98 F | OXYGEN SATURATION: 97 % | HEIGHT: 67 IN

## 2019-11-13 DIAGNOSIS — R30.0 DYSURIA: ICD-10-CM

## 2019-11-13 DIAGNOSIS — R39.9 LOWER URINARY TRACT SYMPTOMS (LUTS): ICD-10-CM

## 2019-11-13 DIAGNOSIS — Z01.818 PRE-OP TESTING: ICD-10-CM

## 2019-11-13 PROCEDURE — 52000 CYSTOURETHROSCOPY: CPT | Mod: ,,, | Performed by: UROLOGY

## 2019-11-13 PROCEDURE — 71000033 HC RECOVERY, INTIAL HOUR: Performed by: UROLOGY

## 2019-11-13 PROCEDURE — 36000706: Performed by: UROLOGY

## 2019-11-13 PROCEDURE — 63600175 PHARM REV CODE 636 W HCPCS: Performed by: UROLOGY

## 2019-11-13 PROCEDURE — 25000003 PHARM REV CODE 250: Performed by: UROLOGY

## 2019-11-13 PROCEDURE — D9220A PRA ANESTHESIA: ICD-10-PCS | Mod: ANES,,, | Performed by: ANESTHESIOLOGY

## 2019-11-13 PROCEDURE — 37000009 HC ANESTHESIA EA ADD 15 MINS: Performed by: UROLOGY

## 2019-11-13 PROCEDURE — D9220A PRA ANESTHESIA: Mod: ANES,,, | Performed by: ANESTHESIOLOGY

## 2019-11-13 PROCEDURE — D9220A PRA ANESTHESIA: Mod: CRNA,,, | Performed by: NURSE ANESTHETIST, CERTIFIED REGISTERED

## 2019-11-13 PROCEDURE — D9220A PRA ANESTHESIA: ICD-10-PCS | Mod: CRNA,,, | Performed by: NURSE ANESTHETIST, CERTIFIED REGISTERED

## 2019-11-13 PROCEDURE — 71000015 HC POSTOP RECOV 1ST HR: Performed by: UROLOGY

## 2019-11-13 PROCEDURE — 93005 ELECTROCARDIOGRAM TRACING: CPT | Mod: 59

## 2019-11-13 PROCEDURE — 63600175 PHARM REV CODE 636 W HCPCS: Performed by: NURSE ANESTHETIST, CERTIFIED REGISTERED

## 2019-11-13 PROCEDURE — 36000707: Performed by: UROLOGY

## 2019-11-13 PROCEDURE — 52000 PR CYSTOURETHROSCOPY: ICD-10-PCS | Mod: ,,, | Performed by: UROLOGY

## 2019-11-13 PROCEDURE — 37000008 HC ANESTHESIA 1ST 15 MINUTES: Performed by: UROLOGY

## 2019-11-13 RX ORDER — CEFAZOLIN SODIUM 2 G/50ML
2 SOLUTION INTRAVENOUS
Status: COMPLETED | OUTPATIENT
Start: 2019-11-13 | End: 2019-11-13

## 2019-11-13 RX ORDER — LIDOCAINE HYDROCHLORIDE 10 MG/ML
1 INJECTION, SOLUTION EPIDURAL; INFILTRATION; INTRACAUDAL; PERINEURAL ONCE
Status: CANCELLED | OUTPATIENT
Start: 2019-11-13 | End: 2019-11-13

## 2019-11-13 RX ORDER — MEPERIDINE HYDROCHLORIDE 50 MG/ML
25 INJECTION INTRAMUSCULAR; INTRAVENOUS; SUBCUTANEOUS EVERY 5 MIN PRN
Status: DISCONTINUED | OUTPATIENT
Start: 2019-11-13 | End: 2019-11-13 | Stop reason: HOSPADM

## 2019-11-13 RX ORDER — MIDAZOLAM HYDROCHLORIDE 1 MG/ML
INJECTION, SOLUTION INTRAMUSCULAR; INTRAVENOUS
Status: DISCONTINUED | OUTPATIENT
Start: 2019-11-13 | End: 2019-11-13

## 2019-11-13 RX ORDER — PROPOFOL 10 MG/ML
VIAL (ML) INTRAVENOUS
Status: DISCONTINUED | OUTPATIENT
Start: 2019-11-13 | End: 2019-11-13

## 2019-11-13 RX ORDER — LIDOCAINE HYDROCHLORIDE 20 MG/ML
JELLY TOPICAL
Status: DISCONTINUED | OUTPATIENT
Start: 2019-11-13 | End: 2019-11-13 | Stop reason: HOSPADM

## 2019-11-13 RX ORDER — SODIUM CHLORIDE, SODIUM LACTATE, POTASSIUM CHLORIDE, CALCIUM CHLORIDE 600; 310; 30; 20 MG/100ML; MG/100ML; MG/100ML; MG/100ML
INJECTION, SOLUTION INTRAVENOUS CONTINUOUS
Status: CANCELLED | OUTPATIENT
Start: 2019-11-13

## 2019-11-13 RX ORDER — SODIUM CHLORIDE, SODIUM LACTATE, POTASSIUM CHLORIDE, CALCIUM CHLORIDE 600; 310; 30; 20 MG/100ML; MG/100ML; MG/100ML; MG/100ML
125 INJECTION, SOLUTION INTRAVENOUS CONTINUOUS
Status: DISCONTINUED | OUTPATIENT
Start: 2019-11-13 | End: 2019-11-13 | Stop reason: HOSPADM

## 2019-11-13 RX ORDER — SODIUM CHLORIDE, SODIUM LACTATE, POTASSIUM CHLORIDE, CALCIUM CHLORIDE 600; 310; 30; 20 MG/100ML; MG/100ML; MG/100ML; MG/100ML
INJECTION, SOLUTION INTRAVENOUS CONTINUOUS PRN
Status: DISCONTINUED | OUTPATIENT
Start: 2019-11-13 | End: 2019-11-13

## 2019-11-13 RX ORDER — ONDANSETRON 2 MG/ML
4 INJECTION INTRAMUSCULAR; INTRAVENOUS DAILY PRN
Status: DISCONTINUED | OUTPATIENT
Start: 2019-11-13 | End: 2019-11-13 | Stop reason: HOSPADM

## 2019-11-13 RX ADMIN — MIDAZOLAM HYDROCHLORIDE 2 MG: 1 INJECTION, SOLUTION INTRAMUSCULAR; INTRAVENOUS at 07:11

## 2019-11-13 RX ADMIN — SODIUM CHLORIDE, POTASSIUM CHLORIDE, SODIUM LACTATE AND CALCIUM CHLORIDE: 600; 310; 30; 20 INJECTION, SOLUTION INTRAVENOUS at 07:11

## 2019-11-13 RX ADMIN — CEFAZOLIN SODIUM 2 G: 2 SOLUTION INTRAVENOUS at 07:11

## 2019-11-13 RX ADMIN — PROPOFOL 200 MG: 10 INJECTION, EMULSION INTRAVENOUS at 07:11

## 2019-11-13 NOTE — OP NOTE
CYSTOSCOPY REPORT    Surgery Date:  2019  Surgeon(s) and Role:     * Shun Nuñez MD - Primary    Alessia Nelson  : 1955  Pre Procedure Diagnosis:LUTS    OPERATION: CYSTOSCOPY    ANESTHESIA: 10 cc 2% lidocaine jelly applied per urethra. General      PROCEDURE:  The patient was taken to the cystoscopy suite and placed in lithotomy position.  The genitalia was prepped and draped  in the usual sterile fashion.  Two percent lidocaine jelly was inserted in the urethra.  After sufficent time had passed to allow good local anesthesia, the cystoscope was inserted in the urethra. The dome, anterior, posterior and lateral walls were examined systematically.  The ureteral orifices were in their usual position and configuration.   The cystoscope was then brought to the level of the bladder neck, the water was turned on and the urethra was visualized.  The cystoscope was removed.  Specimen:none     FINDINGS:  URETHRA: Calibrated to 18 fr. Dilated to 30 fr.  BLADDER: Mild chronic inflammation noted.No lesions seen  TRABEC:grade 2  URETERAL ORIFICES:NSSP clear efflux  OTHER FINDINGS:none    Procedure medications: Lidocaine gel in the urethra  Post Procedure Diagnosis: Mild chronic cystitis, Urethral stenosis, OAB    ASSESSMENT/PLAN:    Status Post  cystoscopy.  1. Push fluids for 24 hours.  2. May see blood in the urine, this should gradually improve over the next 2-3 days.  3. The patient was instructed to return to the office or go to the emergency should fever, chills, cloudy urine, or inability to urinate develop.  4.  PLAN:Myrbetriq 50 po qd. X 1 yr  5. Follow up in 4 weeks with Lynne Parsons.

## 2019-11-13 NOTE — DISCHARGE INSTRUCTIONS
CYSTOSCOPY  After surgery:  DOS:   Minimal activity for first 24 hours.   Advance diet as tolerated.   Drink a lot of liquids until you see your doctor.   Resume home medications as instructed.   You may see bllod in the urine for the next 24 hours. This is normal.    DONT:   No driving for 24 hours or while taking narcotic pain medications   DO NOT TAKE ADDITIONAL TYLENOL/ACETAMINOPHEN WHILE TAKING NARCOTIC PAIN MEDICATION THAT CONTAINS TYLENOL/ACETAMINOPHEN.    CALL PHYSICIAN FOR:   Unable to urinate within 6 hours after surgery.   Fever greater than 100.4   Pain unrelieved by pain medication   Blood in the urine with clots.    MAKE RETURN APPOINTMENT FOR 4 WEEK WITH PAULIE SMITH. CALL FOR APPOINTMENT.     FOR EMERGENCIES CONTACT  OR GO TO YOUR NEAREST EMERGENCY ROOM.   [facility] [place], [place]  [phone]

## 2019-11-13 NOTE — TRANSFER OF CARE
"Anesthesia Transfer of Care Note    Patient: Alessia Nelson    Procedure(s) Performed: Procedure(s) (LRB):  CYSTOURETHROSCOPY (N/A)    Patient location: PACU    Anesthesia Type: general    Transport from OR: Transported from OR on room air with adequate spontaneous ventilation    Post pain: adequate analgesia    Post assessment: no apparent anesthetic complications and tolerated procedure well    Post vital signs: stable    Level of consciousness: awake, alert and oriented    Nausea/Vomiting: no nausea/vomiting    Complications: none    Transfer of care protocol was followed      Last vitals:   Visit Vitals  BP (!) 179/76 (BP Location: Left arm, Patient Position: Lying)   Pulse (!) 54   Temp 36.8 °C (98.3 °F) (Oral)   Resp 13   Ht 5' 7" (1.702 m)   Wt 83.9 kg (185 lb)   SpO2 97%   Breastfeeding? No   BMI 28.98 kg/m²     "

## 2019-11-13 NOTE — H&P
"Alessia Nelson is a 64 y.o. female with medical diagnoses as listed and reviewed within the medical history and problem list that presents as a consult from Cheryl Bradley. Pt has been treated for recurrent UTI. She has had a couple of UA with reflex to cultures that did not trigger. Therefore, culture has not been done. She reports that she has been having pressure and fullness, burning with urination and hesitancy with urination. States, "my urine feels thicker than normal". She denies hematuria or urgency. She has been on Cipro but completed now. She has been on multiple antibiotics over the last several months and has developed a yeast infection with itching and thick vaginal discharge. She reports that she does have some dribbling of urine at times and her urine will appear darker at times. She feels that she empties her bladder well normally. She has no history of bladder cancer or kidney problems. She does have history of hypertension, hypothyroid, and gout. She also sees  for pain management. Pt does have renal cysts and perinephric inflammation on CT scan from 10/3/19. She agrees to examination.        PAST MEDICAL HISTORY:       Past Medical History:   Diagnosis Date    Acute pancreatitis      CAD (coronary artery disease)      CHF (congestive heart failure)      COPD (chronic obstructive pulmonary disease)      Depression      Diverticulosis      GERD (gastroesophageal reflux disease)      Hypertension      Thyroid disease           PAST SURGICAL HISTORY:        Past Surgical History:   Procedure Laterality Date    ADRENAL GLAND SURGERY        CHOLECYSTECTOMY        COLONOSCOPY        CORONARY STENT PLACEMENT        HERNIA REPAIR        HYSTERECTOMY        INCISIONAL HERNIA REPAIR        INJECTION OF ANESTHETIC AGENT AROUND NERVE Right 5/27/2019     Procedure: RIGHT L5-S3 MEDIAL BRANCH BLOCKS;  Surgeon: Sneha Aragon MD;  Location: [facility] OR;  Service: Pain Management;  " Laterality: Right;  **DO NOT STOP PLAVIX**    instestine        PARATHYROID GLAND SURGERY         3 surgeries    RADIOFREQUENCY ABLATION Right 6/10/2019     Procedure: Radiofrequency Ablation - RIGHT L3-5 RADIOFREQUENCY ABLATION WITH HALYARD COOLIEF THERMAL SYSTEM;  Surgeon: Sneha Aragon MD;  Location: [facility];  Service: Pain Management;  Laterality: Right;  **HOLD PLAVIX x 7 DAYS PRIOR**    UPPER GASTROINTESTINAL ENDOSCOPY             SOCIAL HISTORY:  Social History         Socioeconomic History    Marital status: Single       Spouse name: Not on file    Number of children: 0    Years of education: Not on file    Highest education level: Not on file   Occupational History    Not on file   Social Needs    Financial resource strain: Not on file    Food insecurity:       Worry: Not on file       Inability: Not on file    Transportation needs:       Medical: Not on file       Non-medical: Not on file   Tobacco Use    Smoking status: Current Every Day Smoker       Packs/day: 3.00       Years: 15.00       Pack years: 45.00    Smokeless tobacco: Never Used   Substance and Sexual Activity    Alcohol use: No    Drug use: No    Sexual activity: Not Currently   Lifestyle    Physical activity:       Days per week: Not on file       Minutes per session: Not on file    Stress: Not on file   Relationships    Social connections:       Talks on phone: Not on file       Gets together: Not on file       Attends Judaism service: Not on file       Active member of club or organization: Not on file       Attends meetings of clubs or organizations: Not on file       Relationship status: Not on file   Other Topics Concern    Not on file   Social History Narrative    Not on file            FAMILY HISTORY:        Family History   Problem Relation Age of Onset    Stroke Maternal Grandmother      Cancer Maternal Grandfather      Stroke Mother      Heart disease Father           ALLERGIES AND MEDICATIONS:  updated and reviewed.       Review of patient's allergies indicates:   Allergen Reactions    Aspirin      Lortab [hydrocodone-acetaminophen] Itching    Phenergan [promethazine]      Promethazine hcl        Current Medications          Current Outpatient Medications   Medication Sig Dispense Refill    acetaminophen (TYLENOL) 325 MG tablet Take 2 tablets (650 mg total) by mouth every 6 (six) hours as needed (or equal to 101 degree F).        allopurinol (ZYLOPRIM) 300 MG tablet          baclofen (LIORESAL) 10 MG tablet Take 1 tablet (10 mg total) by mouth 2 (two) times daily. 60 tablet 6    benazepril (LOTENSIN) 10 MG tablet Take 1 tablet (10 mg total) by mouth once daily. 90 tablet 3    buPROPion (WELLBUTRIN XL) 150 MG TB24 tablet Take 1 tablet (150 mg total) by mouth once daily. 90 tablet 3    clopidogrel (PLAVIX) 75 mg tablet Take 1 tablet (75 mg total) by mouth once daily. 14 tablet 0    colestipol (COLESTID) 1 gram Tab          escitalopram (LEXAPRO) 20 MG tablet Take 20 mg by mouth once daily.        gabapentin (NEURONTIN) 400 MG capsule Take 1 capsule (400 mg total) by mouth 4 (four) times daily. 360 capsule 3    glycopyrrolate (ROBINUL) 1 mg Tab TK 1 T PO BID 90 tablet 3    levothyroxine (SYNTHROID) 50 MCG tablet Take 50 mcg by mouth once daily.        metoprolol tartrate (LOPRESSOR) 100 MG tablet Take 1 tablet (100 mg total) by mouth 2 (two) times daily. 90 tablet 3    ondansetron (ZOFRAN) 4 MG tablet Take 2 tablets (8 mg total) by mouth every 8 (eight) hours as needed for Nausea. 100 tablet 0    pantoprazole (PROTONIX) 40 MG tablet Take 1 tablet (40 mg total) by mouth once daily. 90 tablet 3    potassium chloride SA (K-DUR,KLOR-CON) 20 MEQ tablet Take 1 tablet (20 mEq total) by mouth once daily. 30 tablet 3    rosuvastatin (CRESTOR) 20 MG tablet Take 1 tablet (20 mg total) by mouth once daily. 90 tablet 3    [START ON 11/1/2019] HYDROcodone-acetaminophen (NORCO) 7.5-325 mg per tablet  "Take 1 tablet by mouth 2 (two) times daily as needed for Pain. 60 tablet 0    ranitidine (ZANTAC) 150 MG tablet Take 150 mg by mouth 2 (two) times daily.        traZODone (DESYREL) 100 MG tablet TAKE 1 TABLET (100 MG TOTAL) BY MOUTH EVERY EVENING. 90 tablet 3      No current facility-administered medications for this visit.                ROS  Review of Systems   Constitutional: Positive for fatigue. Negative for activity change, chills and fever.   Respiratory: Negative for shortness of breath.    Cardiovascular: Negative for chest pain and palpitations.   Gastrointestinal: Negative for abdominal pain, nausea and vomiting.   Genitourinary: Positive for difficulty urinating, dysuria, frequency, pelvic pain and vaginal discharge. Negative for decreased urine volume, flank pain, hematuria, menstrual problem, urgency and vaginal bleeding.   Musculoskeletal: Positive for arthralgias, back pain, myalgias and neck pain. Negative for gait problem.   Skin: Negative for color change, rash and wound.   Neurological: Positive for headaches. Negative for dizziness and syncope.   Psychiatric/Behavioral: Negative for agitation and dysphoric mood. The patient is not nervous/anxious.                PHYSICAL EXAM  Vitals       Vitals:     10/24/19 1034   BP: (!) 142/72   Pulse: (!) 50   Resp: 16   Temp: 98 °F (36.7 °C)   TempSrc: Oral   Weight: 86.6 kg (191 lb)   Height: 5' 7" (1.702 m)       Body mass index is 29.91 kg/m².  Weight: 86.6 kg (191 lb)   Height: 5' 7" (170.2 cm)         Physical Exam   Constitutional: She is oriented to person, place, and time. She appears well-developed and well-nourished.   HENT:   Head: Normocephalic.   Eyes: Pupils are equal, round, and reactive to light.   Neck: Normal range of motion.   Cardiovascular: Normal rate, S1 normal and S2 normal.   Pulmonary/Chest: Effort normal.   Abdominal: Soft. Normal appearance. She exhibits no distension. There is no tenderness.   Genitourinary:   Genitourinary " Comments: Bladder scan 150 ml. Pt did not void.   Repeat PVR 8 ml. She did not catch urine for urinalysis.   Minimal stress incontinence. No cystocele. Questionable stenosis of urethra.   Musculoskeletal: Normal range of motion.   Neurological: She is alert and oriented to person, place, and time.   Skin: Skin is warm and dry. Capillary refill takes less than 2 seconds.   Psychiatric: She has a normal mood and affect. Her speech is normal and behavior is normal. Thought content normal. Cognition and memory are normal.   Vitals reviewed.      IMP: Recurrent UTI's    Plan: Cystoscopy

## 2019-11-13 NOTE — TELEPHONE ENCOUNTER
Called about JOSE rescheduled 11/25/19 at 1300.  Left voicemail with call back number for questions.  Instructions mailed.  She will hold Plavix for 5 days prior to procedure with permission from Dr Jovanni Serrano.

## 2019-11-13 NOTE — BRIEF OP NOTE
Brief Operative Note     Surgery Date: 11/13/2019    Surgeon:   Shun Nuñez MD     Pre-op Diagnosis:  Dysuria [R30.0]  Lower urinary tract symptoms (LUTS) [R39.9]    Post-op Diagnosis:  OAB Chronic Cystitis  Procedure:  Procedure(s) (LRB):  CYSTOURETHROSCOPY (N/A)  Urethral dilation  Anesthesia: General    Findings/Key Components:  Chronic cystitis, mild urethral stenosis    Estimated Blood Loss: Minimal                                                                                                                           Discharge Summary    Admit Date: 11/13/2019     Attending Physician: Shun Nuñez MD     Discharge Physician: Shun Nuñez MD      Discharge Date: 11/13/2019    Treatments/Procedures: Procedure(s) (LRB):  CYSTOURETHROSCOPY (N/A) Urethral dilation  Final Diagnosis:Chronic cystitis, OAB  Hospital Course: Urethral dilation and cystoscopy done as planned.  F.U. Mrs Lynne Parsons in 4 weeks  Disposition: Home or Self Care     Patient Instructions:     Current Discharge Medication List:         Alessia Nelson   Home Medication Instructions SURY:[id number]    Printed on:11/13/19 0757   Medication Information                      acetaminophen (TYLENOL) 325 MG tablet  Take 2 tablets (650 mg total) by mouth every 6 (six) hours as needed (or equal to 101 degree F).             allopurinol (ZYLOPRIM) 300 MG tablet               baclofen (LIORESAL) 10 MG tablet  Take 1 tablet (10 mg total) by mouth 2 (two) times daily.             benazepril (LOTENSIN) 10 MG tablet  Take 1 tablet (10 mg total) by mouth once daily.             buPROPion (WELLBUTRIN XL) 150 MG TB24 tablet  Take 1 tablet (150 mg total) by mouth once daily.             clopidogrel (PLAVIX) 75 mg tablet  Take 1 tablet (75 mg total) by mouth once daily.             colestipol (COLESTID) 1 gram Tab               escitalopram (LEXAPRO) 20 MG tablet  Take 20 mg by mouth once daily.             gabapentin (NEURONTIN) 400  MG capsule  Take 1 capsule (400 mg total) by mouth 4 (four) times daily.             glycopyrrolate (ROBINUL) 1 mg Tab  TK 1 T PO BID             HYDROcodone-acetaminophen (NORCO) 7.5-325 mg per tablet  Take 1 tablet by mouth 2 (two) times daily as needed for Pain.             levothyroxine (SYNTHROID) 50 MCG tablet  Take 50 mcg by mouth once daily.             metoprolol tartrate (LOPRESSOR) 100 MG tablet  Take 1 tablet (100 mg total) by mouth 2 (two) times daily.             mirabegron (MYRBETRIQ) 50 mg Tb24  Take 1 tablet (50 mg total) by mouth once daily.             ondansetron (ZOFRAN) 4 MG tablet  Take 2 tablets (8 mg total) by mouth every 8 (eight) hours as needed for Nausea.             pantoprazole (PROTONIX) 40 MG tablet  Take 1 tablet (40 mg total) by mouth once daily.             potassium chloride SA (K-DUR,KLOR-CON) 20 MEQ tablet  Take 1 tablet (20 mEq total) by mouth once daily.             ranitidine (ZANTAC) 150 MG tablet  Take 150 mg by mouth 2 (two) times daily.             rosuvastatin (CRESTOR) 20 MG tablet  Take 1 tablet (20 mg total) by mouth once daily.             traZODone (DESYREL) 100 MG tablet  TAKE 1 TABLET (100 MG TOTAL) BY MOUTH EVERY EVENING.                     Current Discharge Medication List      START taking these medications    Details   mirabegron (MYRBETRIQ) 50 mg Tb24 Take 1 tablet (50 mg total) by mouth once daily.  Qty: 30 tablet, Refills: 11         CONTINUE these medications which have NOT CHANGED    Details   allopurinol (ZYLOPRIM) 300 MG tablet       benazepril (LOTENSIN) 10 MG tablet Take 1 tablet (10 mg total) by mouth once daily.  Qty: 90 tablet, Refills: 3    Associated Diagnoses: Essential hypertension      glycopyrrolate (ROBINUL) 1 mg Tab TK 1 T PO BID  Qty: 90 tablet, Refills: 3    Associated Diagnoses: Esophageal spasm      metoprolol tartrate (LOPRESSOR) 100 MG tablet Take 1 tablet (100 mg total) by mouth 2 (two) times daily.  Qty: 90 tablet, Refills: 3       pantoprazole (PROTONIX) 40 MG tablet Take 1 tablet (40 mg total) by mouth once daily.  Qty: 90 tablet, Refills: 3    Associated Diagnoses: Gastroesophageal reflux disease, esophagitis presence not specified      rosuvastatin (CRESTOR) 20 MG tablet Take 1 tablet (20 mg total) by mouth once daily.  Qty: 90 tablet, Refills: 3    Associated Diagnoses: Hyperlipidemia, unspecified hyperlipidemia type      acetaminophen (TYLENOL) 325 MG tablet Take 2 tablets (650 mg total) by mouth every 6 (six) hours as needed (or equal to 101 degree F).      baclofen (LIORESAL) 10 MG tablet Take 1 tablet (10 mg total) by mouth 2 (two) times daily.  Qty: 60 tablet, Refills: 6    Associated Diagnoses: History of torticollis; Spondylosis of lumbosacral region without myelopathy or radiculopathy      buPROPion (WELLBUTRIN XL) 150 MG TB24 tablet Take 1 tablet (150 mg total) by mouth once daily.  Qty: 90 tablet, Refills: 3      clopidogrel (PLAVIX) 75 mg tablet Take 1 tablet (75 mg total) by mouth once daily.  Qty: 14 tablet, Refills: 0      colestipol (COLESTID) 1 gram Tab       escitalopram (LEXAPRO) 20 MG tablet Take 20 mg by mouth once daily.      gabapentin (NEURONTIN) 400 MG capsule Take 1 capsule (400 mg total) by mouth 4 (four) times daily.  Qty: 360 capsule, Refills: 3    Associated Diagnoses: Chronic pain disorder      HYDROcodone-acetaminophen (NORCO) 7.5-325 mg per tablet Take 1 tablet by mouth 2 (two) times daily as needed for Pain.  Qty: 60 tablet, Refills: 0    Associated Diagnoses: Chronic pain disorder; Chronic right hip pain; Greater trochanteric bursitis of right hip; Sacroiliac joint pain; Chronic pain of right knee      levothyroxine (SYNTHROID) 50 MCG tablet Take 50 mcg by mouth once daily.      ondansetron (ZOFRAN) 4 MG tablet Take 2 tablets (8 mg total) by mouth every 8 (eight) hours as needed for Nausea.  Qty: 100 tablet, Refills: 0    Associated Diagnoses: Nausea      potassium chloride SA (K-DUR,KLOR-CON) 20 MEQ  tablet Take 1 tablet (20 mEq total) by mouth once daily.  Qty: 30 tablet, Refills: 3      ranitidine (ZANTAC) 150 MG tablet Take 150 mg by mouth 2 (two) times daily.      traZODone (DESYREL) 100 MG tablet TAKE 1 TABLET (100 MG TOTAL) BY MOUTH EVERY EVENING.  Qty: 90 tablet, Refills: 3    Associated Diagnoses: Primary insomnia             Discharge Procedure Orders:    Discharge Procedure Orders   Diet Adult Regular

## 2019-11-14 ENCOUNTER — OFFICE VISIT (OUTPATIENT)
Dept: FAMILY MEDICINE | Facility: CLINIC | Age: 64
End: 2019-11-14
Payer: MEDICARE

## 2019-11-14 VITALS
TEMPERATURE: 98 F | WEIGHT: 186 LBS | BODY MASS INDEX: 29.19 KG/M2 | RESPIRATION RATE: 19 BRPM | HEIGHT: 67 IN | DIASTOLIC BLOOD PRESSURE: 79 MMHG | HEART RATE: 65 BPM | SYSTOLIC BLOOD PRESSURE: 185 MMHG | OXYGEN SATURATION: 95 %

## 2019-11-14 DIAGNOSIS — I10 ESSENTIAL HYPERTENSION: Primary | ICD-10-CM

## 2019-11-14 DIAGNOSIS — D49.2 ABNORMAL SKIN GROWTH: ICD-10-CM

## 2019-11-14 DIAGNOSIS — E03.9 HYPOTHYROIDISM, UNSPECIFIED TYPE: ICD-10-CM

## 2019-11-14 PROCEDURE — 3008F PR BODY MASS INDEX (BMI) DOCUMENTED: ICD-10-PCS | Mod: CPTII,S$GLB,, | Performed by: NURSE PRACTITIONER

## 2019-11-14 PROCEDURE — 99213 OFFICE O/P EST LOW 20 MIN: CPT | Mod: 25,S$GLB,, | Performed by: NURSE PRACTITIONER

## 2019-11-14 PROCEDURE — 99999 PR PBB SHADOW E&M-EST. PATIENT-LVL III: CPT | Mod: PBBFAC,,, | Performed by: NURSE PRACTITIONER

## 2019-11-14 PROCEDURE — 99999 PR PBB SHADOW E&M-EST. PATIENT-LVL III: ICD-10-PCS | Mod: PBBFAC,,, | Performed by: NURSE PRACTITIONER

## 2019-11-14 PROCEDURE — 3078F DIAST BP <80 MM HG: CPT | Mod: CPTII,S$GLB,, | Performed by: NURSE PRACTITIONER

## 2019-11-14 PROCEDURE — 3077F SYST BP >= 140 MM HG: CPT | Mod: CPTII,S$GLB,, | Performed by: NURSE PRACTITIONER

## 2019-11-14 PROCEDURE — 90686 FLU VACCINE (QUAD) GREATER THAN OR EQUAL TO 3YO PRESERVATIVE FREE IM: ICD-10-PCS | Mod: S$GLB,,, | Performed by: NURSE PRACTITIONER

## 2019-11-14 PROCEDURE — 90686 IIV4 VACC NO PRSV 0.5 ML IM: CPT | Mod: S$GLB,,, | Performed by: NURSE PRACTITIONER

## 2019-11-14 PROCEDURE — G0008 ADMIN INFLUENZA VIRUS VAC: HCPCS | Mod: S$GLB,,, | Performed by: NURSE PRACTITIONER

## 2019-11-14 PROCEDURE — 3078F PR MOST RECENT DIASTOLIC BLOOD PRESSURE < 80 MM HG: ICD-10-PCS | Mod: CPTII,S$GLB,, | Performed by: NURSE PRACTITIONER

## 2019-11-14 PROCEDURE — G0008 FLU VACCINE (QUAD) GREATER THAN OR EQUAL TO 3YO PRESERVATIVE FREE IM: ICD-10-PCS | Mod: S$GLB,,, | Performed by: NURSE PRACTITIONER

## 2019-11-14 PROCEDURE — 99213 PR OFFICE/OUTPT VISIT, EST, LEVL III, 20-29 MIN: ICD-10-PCS | Mod: 25,S$GLB,, | Performed by: NURSE PRACTITIONER

## 2019-11-14 PROCEDURE — 3077F PR MOST RECENT SYSTOLIC BLOOD PRESSURE >= 140 MM HG: ICD-10-PCS | Mod: CPTII,S$GLB,, | Performed by: NURSE PRACTITIONER

## 2019-11-14 PROCEDURE — 3008F BODY MASS INDEX DOCD: CPT | Mod: CPTII,S$GLB,, | Performed by: NURSE PRACTITIONER

## 2019-11-14 RX ORDER — BENAZEPRIL HYDROCHLORIDE 20 MG/1
20 TABLET ORAL DAILY
Qty: 30 TABLET | Refills: 6 | Status: SHIPPED | OUTPATIENT
Start: 2019-11-14 | End: 2019-11-14

## 2019-11-14 RX ORDER — HYDROCHLOROTHIAZIDE 12.5 MG/1
12.5 TABLET ORAL DAILY
Qty: 30 TABLET | Refills: 11 | Status: SHIPPED | OUTPATIENT
Start: 2019-11-14 | End: 2021-01-25 | Stop reason: SDUPTHER

## 2019-11-14 RX ORDER — BENAZEPRIL HYDROCHLORIDE 20 MG/1
20 TABLET ORAL DAILY
Qty: 30 TABLET | Refills: 11 | Status: SHIPPED | OUTPATIENT
Start: 2019-11-14 | End: 2021-01-29 | Stop reason: SDUPTHER

## 2019-11-14 RX ORDER — HYDROCHLOROTHIAZIDE 12.5 MG/1
12.5 TABLET ORAL DAILY
Qty: 30 TABLET | Refills: 11 | Status: SHIPPED | OUTPATIENT
Start: 2019-11-14 | End: 2019-11-14

## 2019-11-14 NOTE — PROGRESS NOTES
Subjective:       Patient ID: Alessia Nelson is a 64 y.o. female.    Chief Complaint: Follow-up (4 week) and Immunizations (flu vacc today)    Ms. Alessia Nelson is a 64 year old female who presents to the clinic today for follow up exam.   She recently had a cystoscopy. She is seeing Dr. Kee for urology. Has upcoming f/u appt. Started taking Currently, taking mirabegron.   Today, she reports she woke up at noon and did not take her blood pressure medicine. In regards to the patient's hypertension, patient denies chest pain/sob, and has been compliant with the medication regimen. hypertension poorly controlled. Goal of <130/80. Meds and labs as per orders. Patient taking benazpril 10 mg daily, and metoprolol. Reports intermittent leg swelling.   In regards to their hypothyroidism, patient denies poor energy, skin and hair changes, as well as weight gain. TSH  Not up to date. Labs were ordered but not obtained. Patient taking 50 mcg of levothyroxine daily. Complaint without side effects.   Patient continues to take allpurinol for gout without any concerns. Denies any recent flare ups.  Patient continues to see Dr. Aragon for pain management.       Review of Systems   Constitutional: Negative for activity change, chills, fatigue, fever and unexpected weight change.   HENT: Negative for congestion, ear pain, postnasal drip, sinus pressure, sneezing, sore throat and tinnitus.    Eyes: Negative for pain, redness and visual disturbance.   Respiratory: Negative for apnea, cough, chest tightness, shortness of breath and wheezing.    Cardiovascular: Negative for chest pain, palpitations and leg swelling.   Gastrointestinal: Positive for abdominal pain. Negative for abdominal distention, blood in stool, constipation, diarrhea, nausea (intermittent) and vomiting.   Endocrine: Negative for polydipsia, polyphagia and polyuria.   Genitourinary: Negative for difficulty urinating, dysuria, flank pain, frequency, hematuria and  "urgency.   Musculoskeletal: Positive for arthralgias, back pain and myalgias. Negative for joint swelling.        Sees pain management   Skin: Negative for color change, pallor and rash.   Allergic/Immunologic: Negative for environmental allergies, food allergies and immunocompromised state.   Neurological: Negative for dizziness, tremors, syncope, weakness, light-headedness and headaches.   Hematological: Does not bruise/bleed easily.   Psychiatric/Behavioral: Negative for agitation, confusion, decreased concentration, self-injury, sleep disturbance and suicidal ideas. The patient is not nervous/anxious and is not hyperactive.          Reviewed family, medical, surgical, and social history.    Objective:      BP (!) 185/79 (BP Location: Right arm, Patient Position: Sitting, BP Method: Medium (Automatic))   Pulse 65   Temp 97.6 °F (36.4 °C) (Tympanic)   Resp 19   Ht 5' 7" (1.702 m)   Wt 84.4 kg (186 lb)   SpO2 95%   BMI 29.13 kg/m²   Physical Exam   Constitutional: She is oriented to person, place, and time. She appears well-developed and well-nourished. She is cooperative. No distress.   HENT:   Head: Normocephalic and atraumatic.   Right Ear: Hearing, tympanic membrane, external ear and ear canal normal.   Left Ear: Hearing, tympanic membrane, external ear and ear canal normal.   Nose: Nose normal.   Mouth/Throat: Uvula is midline, oropharynx is clear and moist and mucous membranes are normal. No uvula swelling.   Eyes: Pupils are equal, round, and reactive to light. Conjunctivae, EOM and lids are normal.   Neck: Trachea normal and full passive range of motion without pain. No tracheal tenderness present. No neck rigidity. No thyroid mass present.   Cardiovascular: Normal rate, regular rhythm, S1 normal, S2 normal, normal heart sounds, intact distal pulses and normal pulses.   No murmur heard.  Pulmonary/Chest: Effort normal and breath sounds normal. No respiratory distress. She has no decreased breath " sounds. She has no wheezes.   Abdominal: Soft. Normal appearance and bowel sounds are normal. She exhibits no distension and no abdominal bruit. There is tenderness. There is no guarding and no CVA tenderness.   Musculoskeletal: She exhibits no edema, tenderness or deformity.   Lymphadenopathy:     She has no cervical adenopathy.   Neurological: She is alert and oriented to person, place, and time. She has normal strength. She exhibits normal muscle tone.   Skin: Skin is warm, dry and intact. Capillary refill takes less than 2 seconds. Lesion noted. No abrasion, no laceration and no rash noted. She is not diaphoretic. No cyanosis. Nails show no clubbing.   Abnormal skin growth noted on scalp    Psychiatric: She has a normal mood and affect. Her speech is normal and behavior is normal. Judgment and thought content normal. Cognition and memory are normal.       Assessment:       1. Essential hypertension    2. Abnormal skin growth    3. Hypothyroidism, unspecified type        Plan:       Essential hypertension  -     Discontinue: benazepril (LOTENSIN) 20 MG tablet; Take 1 tablet (20 mg total) by mouth once daily.  Dispense: 30 tablet; Refill: 6  -     Discontinue: hydroCHLOROthiazide (HYDRODIURIL) 12.5 MG Tab; Take 1 tablet (12.5 mg total) by mouth once daily.  Dispense: 30 tablet; Refill: 11  -     benazepril (LOTENSIN) 20 MG tablet; Take 1 tablet (20 mg total) by mouth once daily.  Dispense: 30 tablet; Refill: 11  -     hydroCHLOROthiazide (HYDRODIURIL) 12.5 MG Tab; Take 1 tablet (12.5 mg total) by mouth once daily.  Dispense: 30 tablet; Refill: 11    Abnormal skin growth  -     Ambulatory referral to Dermatology    Hypothyroidism, unspecified type    Other orders  -     Influenza - Quadrivalent (6 months+) (PF)          PLAN:  - Discussed with patient the plan of care  - Obtain labs; encouraged  - Flu shot given  - Refer to dermatology   - Medications reviewed. Medication side effects discussed. Patient has no  questions or concerns at this time. Informed patient to notify me regarding any concerns.   - Continue monitoring BP  - increase benazpril to 20 mg daily. Add HCTZ  - Informed patient to please notify me with any questions or concerns at anytime  - Follow up ordered for 2 weeks for blood pressure control       Risks, benefits, and side effects were discussed with the patient. All questions were answered to the fullest satisfaction of the patient, and pt verbalized understanding and agreement to treatment plan. Pt was to call with any new or worsening symptoms, or present to the ER.

## 2019-11-25 ENCOUNTER — ANESTHESIA (OUTPATIENT)
Dept: ENDOSCOPY | Facility: HOSPITAL | Age: 64
End: 2019-11-25
Payer: MEDICARE

## 2019-11-25 ENCOUNTER — ANESTHESIA EVENT (OUTPATIENT)
Dept: ENDOSCOPY | Facility: HOSPITAL | Age: 64
End: 2019-11-25
Payer: MEDICARE

## 2019-11-25 ENCOUNTER — HOSPITAL ENCOUNTER (OUTPATIENT)
Facility: HOSPITAL | Age: 64
Discharge: HOME | End: 2019-11-25
Attending: INTERNAL MEDICINE | Admitting: INTERNAL MEDICINE
Payer: MEDICARE

## 2019-11-25 VITALS
DIASTOLIC BLOOD PRESSURE: 75 MMHG | BODY MASS INDEX: 28.41 KG/M2 | TEMPERATURE: 98 F | SYSTOLIC BLOOD PRESSURE: 173 MMHG | RESPIRATION RATE: 17 BRPM | HEART RATE: 69 BPM | HEIGHT: 67 IN | WEIGHT: 181 LBS | OXYGEN SATURATION: 96 %

## 2019-11-25 DIAGNOSIS — K86.2 PANCREAS CYST: Primary | ICD-10-CM

## 2019-11-25 PROCEDURE — 63600175 PHARM REV CODE 636 W HCPCS: Performed by: INTERNAL MEDICINE

## 2019-11-25 PROCEDURE — 63600175 PHARM REV CODE 636 W HCPCS: Performed by: NURSE ANESTHETIST, CERTIFIED REGISTERED

## 2019-11-25 PROCEDURE — D9220A PRA ANESTHESIA: ICD-10-PCS | Mod: CRNA,,, | Performed by: NURSE ANESTHETIST, CERTIFIED REGISTERED

## 2019-11-25 PROCEDURE — 43259 EGD US EXAM DUODENUM/JEJUNUM: CPT | Mod: ,,, | Performed by: INTERNAL MEDICINE

## 2019-11-25 PROCEDURE — 37000008 HC ANESTHESIA 1ST 15 MINUTES: Performed by: INTERNAL MEDICINE

## 2019-11-25 PROCEDURE — D9220A PRA ANESTHESIA: Mod: CRNA,,, | Performed by: NURSE ANESTHETIST, CERTIFIED REGISTERED

## 2019-11-25 PROCEDURE — D9220A PRA ANESTHESIA: Mod: ANES,,, | Performed by: ANESTHESIOLOGY

## 2019-11-25 PROCEDURE — 63600175 PHARM REV CODE 636 W HCPCS: Performed by: ANESTHESIOLOGY

## 2019-11-25 PROCEDURE — D9220A PRA ANESTHESIA: ICD-10-PCS | Mod: ANES,,, | Performed by: ANESTHESIOLOGY

## 2019-11-25 PROCEDURE — 43259 EGD US EXAM DUODENUM/JEJUNUM: CPT | Performed by: INTERNAL MEDICINE

## 2019-11-25 PROCEDURE — 37000009 HC ANESTHESIA EA ADD 15 MINS: Performed by: INTERNAL MEDICINE

## 2019-11-25 PROCEDURE — 25000003 PHARM REV CODE 250: Performed by: NURSE ANESTHETIST, CERTIFIED REGISTERED

## 2019-11-25 PROCEDURE — 43259 PR ENDOSCOPIC ULTRASOUND EXAM: ICD-10-PCS | Mod: ,,, | Performed by: INTERNAL MEDICINE

## 2019-11-25 RX ORDER — LIDOCAINE HYDROCHLORIDE 20 MG/ML
SOLUTION OROPHARYNGEAL
Status: DISCONTINUED | OUTPATIENT
Start: 2019-11-25 | End: 2019-11-25

## 2019-11-25 RX ORDER — SODIUM CHLORIDE 9 MG/ML
INJECTION, SOLUTION INTRAVENOUS CONTINUOUS
Status: DISCONTINUED | OUTPATIENT
Start: 2019-11-25 | End: 2019-11-25 | Stop reason: HOSPADM

## 2019-11-25 RX ORDER — GLYCOPYRROLATE 0.2 MG/ML
INJECTION INTRAMUSCULAR; INTRAVENOUS
Status: DISCONTINUED | OUTPATIENT
Start: 2019-11-25 | End: 2019-11-25

## 2019-11-25 RX ORDER — SODIUM CHLORIDE 0.9 % (FLUSH) 0.9 %
3 SYRINGE (ML) INJECTION
Status: DISCONTINUED | OUTPATIENT
Start: 2019-11-25 | End: 2019-11-25 | Stop reason: HOSPADM

## 2019-11-25 RX ORDER — FENTANYL CITRATE 50 UG/ML
INJECTION, SOLUTION INTRAMUSCULAR; INTRAVENOUS
Status: DISCONTINUED | OUTPATIENT
Start: 2019-11-25 | End: 2019-11-25

## 2019-11-25 RX ORDER — PROPOFOL 10 MG/ML
VIAL (ML) INTRAVENOUS
Status: DISCONTINUED | OUTPATIENT
Start: 2019-11-25 | End: 2019-11-25

## 2019-11-25 RX ORDER — SODIUM CHLORIDE 9 MG/ML
INJECTION, SOLUTION INTRAVENOUS CONTINUOUS PRN
Status: DISCONTINUED | OUTPATIENT
Start: 2019-11-25 | End: 2019-11-25

## 2019-11-25 RX ORDER — LIDOCAINE HCL/PF 100 MG/5ML
SYRINGE (ML) INTRAVENOUS
Status: DISCONTINUED | OUTPATIENT
Start: 2019-11-25 | End: 2019-11-25

## 2019-11-25 RX ORDER — SODIUM CHLORIDE 0.9 % (FLUSH) 0.9 %
10 SYRINGE (ML) INJECTION
Status: CANCELLED | OUTPATIENT
Start: 2019-11-25

## 2019-11-25 RX ORDER — HYDROMORPHONE HYDROCHLORIDE 1 MG/ML
0.2 INJECTION, SOLUTION INTRAMUSCULAR; INTRAVENOUS; SUBCUTANEOUS EVERY 5 MIN PRN
Status: DISCONTINUED | OUTPATIENT
Start: 2019-11-25 | End: 2019-11-25 | Stop reason: HOSPADM

## 2019-11-25 RX ORDER — SODIUM CHLORIDE 9 MG/ML
INJECTION, SOLUTION INTRAVENOUS CONTINUOUS
Status: CANCELLED | OUTPATIENT
Start: 2019-11-25

## 2019-11-25 RX ORDER — PROPOFOL 10 MG/ML
VIAL (ML) INTRAVENOUS CONTINUOUS PRN
Status: DISCONTINUED | OUTPATIENT
Start: 2019-11-25 | End: 2019-11-25

## 2019-11-25 RX ADMIN — PROPOFOL 70 MG: 10 INJECTION, EMULSION INTRAVENOUS at 01:11

## 2019-11-25 RX ADMIN — HYDROMORPHONE HYDROCHLORIDE 0.2 MG: 1 INJECTION, SOLUTION INTRAMUSCULAR; INTRAVENOUS; SUBCUTANEOUS at 01:11

## 2019-11-25 RX ADMIN — FENTANYL CITRATE 25 MCG: 50 INJECTION, SOLUTION INTRAMUSCULAR; INTRAVENOUS at 01:11

## 2019-11-25 RX ADMIN — PROPOFOL 150 MCG/KG/MIN: 10 INJECTION, EMULSION INTRAVENOUS at 01:11

## 2019-11-25 RX ADMIN — SODIUM CHLORIDE: 0.9 INJECTION, SOLUTION INTRAVENOUS at 12:11

## 2019-11-25 RX ADMIN — PROPOFOL 30 MG: 10 INJECTION, EMULSION INTRAVENOUS at 01:11

## 2019-11-25 RX ADMIN — LIDOCAINE HYDROCHLORIDE 15 ML: 20 SOLUTION ORAL; TOPICAL at 01:11

## 2019-11-25 RX ADMIN — FENTANYL CITRATE 50 MCG: 50 INJECTION, SOLUTION INTRAMUSCULAR; INTRAVENOUS at 12:11

## 2019-11-25 RX ADMIN — GLYCOPYRROLATE 0.2 MG: 0.2 INJECTION, SOLUTION INTRAMUSCULAR; INTRAVENOUS at 12:11

## 2019-11-25 RX ADMIN — LIDOCAINE HYDROCHLORIDE 50 MG: 20 INJECTION, SOLUTION INTRAVENOUS at 01:11

## 2019-11-25 NOTE — ANESTHESIA POSTPROCEDURE EVALUATION
Anesthesia Post Evaluation    Patient: Alessia Nelson    Procedure(s) Performed: Procedure(s) (LRB):  ULTRASOUND, UPPER GI TRACT, ENDOSCOPIC (N/A)    Final Anesthesia Type: general    Patient location during evaluation: PACU  Patient participation: Yes- Able to Participate  Level of consciousness: awake and alert and oriented  Post-procedure vital signs: reviewed and stable  Pain management: adequate  Airway patency: patent    PONV status at discharge: No PONV  Anesthetic complications: no      Cardiovascular status: blood pressure returned to baseline and hemodynamically stable  Respiratory status: unassisted and spontaneous ventilation  Hydration status: euvolemic  Follow-up not needed.          Vitals Value Taken Time   /81 11/25/2019  2:21 PM   Temp 36.3 °C (97.4 °F) 11/25/2019  1:42 PM   Pulse 69 11/25/2019  2:23 PM   Resp 17 11/25/2019  2:00 PM   SpO2 96 % 11/25/2019  2:23 PM   Vitals shown include unvalidated device data.      No case tracking events are documented in the log.      Pain/Radha Score: Pain Rating Prior to Med Admin: 8 (11/25/2019  1:56 PM)  Radha Score: 10 (11/25/2019  2:15 PM)

## 2019-11-25 NOTE — PROVATION PATIENT INSTRUCTIONS
Discharge Summary/Instructions after an Endoscopic Procedure  Patient Name: Alessia Nelson  Patient MRN: [id number]  Patient YOB: 1955  Monday, November 25, 2019  Alhaji Bridges MD  RESTRICTIONS:  During your procedure today, you received medications for sedation.  These   medications may affect your judgment, balance and coordination.  Therefore,   for 24 hours, you have the following restrictions:   - DO NOT drive a car, operate machinery, make legal/financial decisions,   sign important papers or drink alcohol.    ACTIVITY:  Today: no heavy lifting, straining or running due to procedural   sedation/anesthesia.  The following day: return to full activity including work.  DIET:  Eat and drink normally unless instructed otherwise.     TREATMENT FOR COMMON SIDE EFFECTS:  - Mild abdominal pain, nausea, belching, bloating or excessive gas:  rest,   eat lightly and use a heating pad.  - Sore Throat: treat with throat lozenges and/or gargle with warm salt   water.  - Because air was used during the procedure, expelling large amounts of air   from your rectum or belching is normal.  - If a bowel prep was taken, you may not have a bowel movement for 1-3 days.    This is normal.  SYMPTOMS TO WATCH FOR AND REPORT TO YOUR PHYSICIAN:  1. Abdominal pain or bloating, other than gas cramps.  2. Chest pain.  3. Back pain.  4. Signs of infection such as: chills or fever occurring within 24 hours   after the procedure.  5. Rectal bleeding, which would show as bright red, maroon, or black stools.   (A tablespoon of blood from the rectum is not serious, especially if   hemorrhoids are present.)  6. Vomiting.  7. Weakness or dizziness.  GO DIRECTLY TO THE NEAREST EMERGENCY ROOM IF YOU HAVE ANY OF THE FOLLOWING:      Difficulty breathing              Chills and/or fever over 101 F   Persistent vomiting and/or vomiting blood   Severe abdominal pain   Severe chest pain   Black, tarry stools   Bleeding- more than one  tablespoon   Any other symptom or condition that you feel may need urgent attention  Your doctor recommends these additional instructions:  If any biopsies were taken, your doctors clinic will contact you in 1 to 2   weeks with any results.  - Discharge patient to home.   - Resume previous diet.   - Continue present medications.   - Return to referring physician.   - Repeat the upper endoscopic ultrasound for surveillance given lobularity   and personal hx of MEN 1.  For questions, problems or results please call your physician - Alhaji Bridges MD at Work:  [phone].  [facility] [place] [facility], EMERGENCY ROOM PHONE NUMBER: [phone]  IF A COMPLICATION OR EMERGENCY SITUATION ARISES AND YOU ARE UNABLE TO REACH   YOUR PHYSICIAN - GO DIRECTLY TO THE EMERGENCY ROOM.  Alhaji Bridges MD  11/25/2019 1:44:45 PM  This report has been verified and signed electronically.  [employer]

## 2019-11-25 NOTE — TRANSFER OF CARE
"Anesthesia Transfer of Care Note    Patient: Alessia Nelson    Procedure(s) Performed: Procedure(s) (LRB):  ULTRASOUND, UPPER GI TRACT, ENDOSCOPIC (N/A)    Patient location: PACU    Anesthesia Type: general    Transport from OR: Transported from OR on 2-3 L/min O2 by NC with adequate spontaneous ventilation    Post pain: adequate analgesia    Post assessment: no apparent anesthetic complications and tolerated procedure well    Post vital signs: stable    Level of consciousness: awake    Nausea/Vomiting: no nausea/vomiting    Complications: none    Transfer of care protocol was followed      Last vitals:   Visit Vitals  BP (!) 197/84 (BP Location: Left arm, Patient Position: Sitting)   Pulse 62   Temp 36.7 °C (98.1 °F) (Temporal)   Resp 16   Ht 5' 7" (1.702 m)   Wt 82.1 kg (181 lb)   SpO2 97%   Breastfeeding? No   BMI 28.35 kg/m²     "

## 2019-11-25 NOTE — H&P
History & Physical - Short Stay  Gastroenterology      SUBJECTIVE:     Procedure: EUS    Chief Complaint/Indication for Procedure: abnormal imaging    History of Present Illness:  Patient is a 64 y.o. female with MEN I and abnormal imaging of biliary system coming for EUS.     PTA Medications   Medication Sig    acetaminophen (TYLENOL) 325 MG tablet Take 2 tablets (650 mg total) by mouth every 6 (six) hours as needed (or equal to 101 degree F).    allopurinol (ZYLOPRIM) 300 MG tablet     baclofen (LIORESAL) 10 MG tablet Take 1 tablet (10 mg total) by mouth 2 (two) times daily.    benazepril (LOTENSIN) 20 MG tablet Take 1 tablet (20 mg total) by mouth once daily.    buPROPion (WELLBUTRIN XL) 150 MG TB24 tablet Take 1 tablet (150 mg total) by mouth once daily.    colestipol (COLESTID) 1 gram Tab     escitalopram (LEXAPRO) 20 MG tablet Take 20 mg by mouth once daily.    gabapentin (NEURONTIN) 400 MG capsule Take 1 capsule (400 mg total) by mouth 4 (four) times daily.    glycopyrrolate (ROBINUL) 1 mg Tab TK 1 T PO BID    hydroCHLOROthiazide (HYDRODIURIL) 12.5 MG Tab Take 1 tablet (12.5 mg total) by mouth once daily.    HYDROcodone-acetaminophen (NORCO) 7.5-325 mg per tablet Take 1 tablet by mouth 2 (two) times daily as needed for Pain.    levothyroxine (SYNTHROID) 50 MCG tablet Take 50 mcg by mouth once daily.    metoprolol tartrate (LOPRESSOR) 100 MG tablet Take 1 tablet (100 mg total) by mouth 2 (two) times daily.    mirabegron (MYRBETRIQ) 50 mg Tb24 Take 1 tablet (50 mg total) by mouth once daily.    ondansetron (ZOFRAN) 4 MG tablet Take 2 tablets (8 mg total) by mouth every 8 (eight) hours as needed for Nausea.    pantoprazole (PROTONIX) 40 MG tablet Take 1 tablet (40 mg total) by mouth once daily.    potassium chloride SA (K-DUR,KLOR-CON) 20 MEQ tablet Take 1 tablet (20 mEq total) by mouth once daily.    ranitidine (ZANTAC) 150 MG tablet Take 150 mg by mouth 2 (two) times daily.    rosuvastatin  (CRESTOR) 20 MG tablet Take 1 tablet (20 mg total) by mouth once daily.    traZODone (DESYREL) 100 MG tablet TAKE 1 TABLET (100 MG TOTAL) BY MOUTH EVERY EVENING.    clopidogrel (PLAVIX) 75 mg tablet Take 1 tablet (75 mg total) by mouth once daily.       Review of patient's allergies indicates:   Allergen Reactions    Aspirin     Lortab [hydrocodone-acetaminophen] Itching    Phenergan [promethazine]     Promethazine hcl         Past Medical History:   Diagnosis Date    Acute pancreatitis     CAD (coronary artery disease)     CHF (congestive heart failure)     COPD (chronic obstructive pulmonary disease)     Depression     Diverticulosis     GERD (gastroesophageal reflux disease)     History of bowel resection     Hypertension     Thyroid disease     TIA (transient ischemic attack)      Past Surgical History:   Procedure Laterality Date    ADRENAL GLAND SURGERY      APPENDECTOMY      BACK SURGERY      CHOLECYSTECTOMY      COLONOSCOPY      CORONARY STENT PLACEMENT      CYSTOURETHROSCOPY N/A 11/13/2019    Procedure: CYSTOURETHROSCOPY;  Surgeon: Shun Nuñez MD;  Location: [facility] OR;  Service: Urology;  Laterality: N/A;    HERNIA REPAIR      HYSTERECTOMY      INCISIONAL HERNIA REPAIR      INJECTION OF ANESTHETIC AGENT AROUND NERVE Right 5/27/2019    Procedure: RIGHT L5-S3 MEDIAL BRANCH BLOCKS;  Surgeon: Sneha Aragon MD;  Location: [facility] OR;  Service: Pain Management;  Laterality: Right;  **DO NOT STOP PLAVIX**    instestine      PARATHYROID GLAND SURGERY      3 surgeries    RADIOFREQUENCY ABLATION Right 6/10/2019    Procedure: Radiofrequency Ablation - RIGHT L3-5 RADIOFREQUENCY ABLATION WITH [employer] COOLIEF THERMAL SYSTEM;  Surgeon: Sneha Aragon MD;  Location: [facility] OR;  Service: Pain Management;  Laterality: Right;  **HOLD PLAVIX x 7 DAYS PRIOR**    UPPER GASTROINTESTINAL ENDOSCOPY       Family History   Problem Relation Age of Onset    Stroke Maternal Grandmother     Cancer  Maternal Grandfather     Stroke Mother     Heart disease Father      Social History     Tobacco Use    Smoking status: Current Every Day Smoker     Packs/day: 1.00     Years: 15.00     Pack years: 15.00    Smokeless tobacco: Never Used   Substance Use Topics    Alcohol use: No    Drug use: No            OBJECTIVE:     Vital Signs (Most Recent)  Temp: 98.1 °F (36.7 °C) (11/25/19 1241)  Pulse: 62 (11/25/19 1241)  Resp: 16 (11/25/19 1241)  BP: (!) 197/84 (11/25/19 1241)  SpO2: 97 % (11/25/19 1241)         ASSESSMENT/PLAN:     Patient is a 64 y.o. female with MEN I and abnormal imaging of biliary system coming for EUS.     Plan: EUS    Anesthesia Plan: Moderate Sedation    ASA Grade: ASA 2 - Patient with mild systemic disease with no functional limitations

## 2019-11-25 NOTE — ANESTHESIA PREPROCEDURE EVALUATION
11/25/2019  Alessia Nelson is a 64 y.o., female with hx of CAD s/p stent (off plavix 5 days for procedure), HTN, TIA and chronic pain here for EGD/EUS.    Past Medical History:   Diagnosis Date    Acute pancreatitis     CAD (coronary artery disease)     CHF (congestive heart failure)     COPD (chronic obstructive pulmonary disease)     Depression     Diverticulosis     GERD (gastroesophageal reflux disease)     History of bowel resection     Hypertension     Thyroid disease     TIA (transient ischemic attack)      Lab Results   Component Value Date    WBC 11.87 08/13/2019    HGB 13.2 08/13/2019    HCT 41.2 08/13/2019    MCV 96 08/13/2019     08/13/2019         Chemistry        Component Value Date/Time     08/13/2019 2005    K 4.0 08/13/2019 2005     08/13/2019 2005    CO2 23 08/13/2019 2005    BUN 14 08/13/2019 2005    CREATININE 0.9 08/13/2019 2005    GLU 96 08/13/2019 2005        Component Value Date/Time    CALCIUM 10.4 08/13/2019 2005    ALKPHOS 77 08/13/2019 2005    AST 23 08/13/2019 2005    ALT 23 08/13/2019 2005    BILITOT 0.4 08/13/2019 2005    ESTGFRAFRICA >60.0 08/13/2019 2005    EGFRNONAA >60.0 08/13/2019 2005            Anesthesia Evaluation    I have reviewed the Patient Summary Reports.    I have reviewed the Nursing Notes.   I have reviewed the Medications.     Review of Systems  Anesthesia Hx:  No problems with previous Anesthesia  Neg history of prior surgery. Denies Family Hx of Anesthesia complications.   Denies Personal Hx of Anesthesia complications.   Social:  Smoker    Hematology/Oncology:  Hematology Normal   Oncology Normal     EENT/Dental:EENT/Dental Normal   Cardiovascular:   Hypertension, well controlled CAD asymptomatic CABG/stent  CHF    Pulmonary:   COPD, mild    Renal/:  Renal/ Normal     Hepatic/GI:   GERD, poorly controlled     Musculoskeletal:   Arthritis     Neurological:   TIA, Neuromuscular Disease,    Endocrine:   Hypothyroidism    Dermatological:  Skin Normal    Psych:   depression          Physical Exam  General:  Well nourished    Airway/Jaw/Neck:  Airway Findings: Mouth Opening: Normal Tongue: Normal  General Airway Assessment: Adult  Mallampati: II  TM Distance: 4 - 6 cm        Eyes/Ears/Nose:  EYES/EARS/NOSE FINDINGS: Normal   Dental:  Dental Findings: Upper Dentures, Lower Dentures   Chest/Lungs:  Chest/Lungs Clear    Heart/Vascular:  Heart Findings: Normal Heart murmur: negative Vascular Findings: Normal    Abdomen:  Abdomen Findings: Normal    Musculoskeletal:  Musculoskeletal Findings: Normal   Skin:  Skin Findings: Normal    Mental Status:  Mental Status Findings: Normal        Anesthesia Plan  Type of Anesthesia, risks & benefits discussed:  Anesthesia Type:  general  Patient's Preference:   Intra-op Monitoring Plan: standard ASA monitors  Intra-op Monitoring Plan Comments:   Post Op Pain Control Plan:   Post Op Pain Control Plan Comments:   Induction:   IV  Beta Blocker:  Patient is not currently on a Beta-Blocker (No further documentation required).       Informed Consent: Patient understands risks and agrees with Anesthesia plan.  Questions answered. Anesthesia consent signed with patient.  ASA Score: 3     Day of Surgery Review of History & Physical:    H&P update referred to the provider.         Ready For Surgery From Anesthesia Perspective.

## 2019-11-26 ENCOUNTER — LAB VISIT (OUTPATIENT)
Dept: LAB | Facility: HOSPITAL | Age: 64
End: 2019-11-26
Attending: NURSE PRACTITIONER
Payer: MEDICARE

## 2019-11-26 ENCOUNTER — CLINICAL SUPPORT (OUTPATIENT)
Dept: FAMILY MEDICINE | Facility: CLINIC | Age: 64
End: 2019-11-26
Payer: MEDICARE

## 2019-11-26 ENCOUNTER — INITIAL CONSULT (OUTPATIENT)
Dept: DERMATOLOGY | Facility: CLINIC | Age: 64
End: 2019-11-26
Payer: MEDICARE

## 2019-11-26 VITALS — DIASTOLIC BLOOD PRESSURE: 74 MMHG | HEART RATE: 54 BPM | SYSTOLIC BLOOD PRESSURE: 158 MMHG

## 2019-11-26 VITALS — BODY MASS INDEX: 28.41 KG/M2 | WEIGHT: 181 LBS | HEIGHT: 67 IN

## 2019-11-26 DIAGNOSIS — L82.1 SK (SEBORRHEIC KERATOSIS): Primary | ICD-10-CM

## 2019-11-26 DIAGNOSIS — Z85.828 HISTORY OF SCC (SQUAMOUS CELL CARCINOMA) OF SKIN: ICD-10-CM

## 2019-11-26 DIAGNOSIS — L73.8 SEBACEOUS HYPERPLASIA: ICD-10-CM

## 2019-11-26 DIAGNOSIS — L30.9 ECZEMA, UNSPECIFIED TYPE: ICD-10-CM

## 2019-11-26 DIAGNOSIS — L57.0 ACTINIC KERATOSIS: ICD-10-CM

## 2019-11-26 DIAGNOSIS — L81.4 LENTIGO: ICD-10-CM

## 2019-11-26 DIAGNOSIS — E03.9 HYPOTHYROIDISM, UNSPECIFIED TYPE: ICD-10-CM

## 2019-11-26 LAB
T4 FREE SERPL-MCNC: 0.64 NG/DL (ref 0.71–1.51)
TSH SERPL DL<=0.005 MIU/L-ACNC: 8.58 UIU/ML (ref 0.34–5.6)

## 2019-11-26 PROCEDURE — 84443 ASSAY THYROID STIM HORMONE: CPT

## 2019-11-26 PROCEDURE — 99203 PR OFFICE/OUTPT VISIT, NEW, LEVL III, 30-44 MIN: ICD-10-PCS | Mod: S$GLB,,, | Performed by: DERMATOLOGY

## 2019-11-26 PROCEDURE — 84439 ASSAY OF FREE THYROXINE: CPT

## 2019-11-26 PROCEDURE — 84436 ASSAY OF TOTAL THYROXINE: CPT

## 2019-11-26 PROCEDURE — 3008F PR BODY MASS INDEX (BMI) DOCUMENTED: ICD-10-PCS | Mod: CPTII,S$GLB,, | Performed by: DERMATOLOGY

## 2019-11-26 PROCEDURE — 36415 COLL VENOUS BLD VENIPUNCTURE: CPT

## 2019-11-26 PROCEDURE — 99999 PR PBB SHADOW E&M-EST. PATIENT-LVL II: CPT | Mod: PBBFAC,,,

## 2019-11-26 PROCEDURE — 99999 PR PBB SHADOW E&M-EST. PATIENT-LVL III: CPT | Mod: PBBFAC,,, | Performed by: DERMATOLOGY

## 2019-11-26 PROCEDURE — 99203 OFFICE O/P NEW LOW 30 MIN: CPT | Mod: S$GLB,,, | Performed by: DERMATOLOGY

## 2019-11-26 PROCEDURE — 99999 PR PBB SHADOW E&M-EST. PATIENT-LVL III: ICD-10-PCS | Mod: PBBFAC,,, | Performed by: DERMATOLOGY

## 2019-11-26 PROCEDURE — 3008F BODY MASS INDEX DOCD: CPT | Mod: CPTII,S$GLB,, | Performed by: DERMATOLOGY

## 2019-11-26 PROCEDURE — 84480 ASSAY TRIIODOTHYRONINE (T3): CPT

## 2019-11-26 PROCEDURE — 99999 PR PBB SHADOW E&M-EST. PATIENT-LVL II: ICD-10-PCS | Mod: PBBFAC,,,

## 2019-11-26 NOTE — PROGRESS NOTES
Here for blood pressure check, automatic large cuff to rt arm-150/72-54, complants of moderate on-going discomfort in lower back to sacral area, provider is aware. Re-check with manual cuff, regular size-158/74 lt arm. Provider notified of readings, voiced concern to patient and verbal medication changes for patient to make, increase benazepril from 20mg daily to 40mg daily and follow up in 2 weeks, appointment made and patient verbalized understanding to increase medication.

## 2019-11-26 NOTE — LETTER
November 26, 2019      Cheryl Bradley, NP  [address]  [place] [address]           [address], [address]  [facility] [address]  Phone: [phone]          Patient: Alessia Nelson   MR Number: [id number]   YOB: 1955   Date of Visit: 11/26/2019       Dear Cheryl Bradley:    Thank you for referring Alessia Nelson to me for evaluation. Attached you will find relevant portions of my assessment and plan of care.    If you have questions, please do not hesitate to call me. I look forward to following Alessia Nelson along with you.    Sincerely,    Maurilio Marcelo MD    Enclosure  CC:  No Recipients    If you would like to receive this communication electronically, please contact [email or web address] or [phone] to request more information on [facility] access.    For providers and/or their staff who would like to refer a patient to [facility], please contact us through our one-stop-shop provider referral line, [facility], at [phone].    If you feel you have received this communication in error or would no longer like to receive these types of communications, please e-mail [email or web address]

## 2019-11-26 NOTE — PROGRESS NOTES
Subjective:       Patient ID:  Alessia Nelson is a 64 y.o. female who presents for   Chief Complaint   Patient presents with    Spot     scalp     HPI    New patient   Hx of SCC, R eyelid  Family hx of skin cancer    Patient presents today with a spot on the scalp, unsure duration, growing and dry, no tx    Review of Systems   Constitutional: Negative for fever, chills and fatigue.   HENT: Positive for congestion. Negative for sore throat.    Respiratory: Negative for cough and shortness of breath.    Musculoskeletal: Negative for joint swelling and arthralgias.   Skin: Negative for daily sunscreen use, activity-related sunscreen use and wears hat.   Hematologic/Lymphatic: Bruises/bleeds easily.        Objective:    Physical Exam   Constitutional: She appears well-developed and well-nourished. No distress.   HENT:   Mouth/Throat: Lips normal.    Eyes: No conjunctival no injection.   Neurological: She is alert and oriented to person, place, and time. She is not disoriented.   Psychiatric: She has a normal mood and affect.   Skin:   Areas Examined (abnormalities noted in diagram):   Scalp / Hair Palpated and Inspected  Head / Face Inspection Performed  Neck Inspection Performed  Back Inspection Performed  Nails and Digits Inspection Performed                       Diagram Legend     Erythematous scaling macule/papule c/w actinic keratosis       Vascular papule c/w angioma      Pigmented verrucoid papule/plaque c/w seborrheic keratosis      Yellow umbilicated papule c/w sebaceous hyperplasia      Irregularly shaped tan macule c/w lentigo     1-2 mm smooth white papules consistent with Milia      Movable subcutaneous cyst with punctum c/w epidermal inclusion cyst      Subcutaneous movable cyst c/w pilar cyst      Firm pink to brown papule c/w dermatofibroma      Pedunculated fleshy papule(s) c/w skin tag(s)      Evenly pigmented macule c/w junctional nevus     Mildly variegated pigmented, slightly  irregular-bordered macule c/w mildly atypical nevus      Flesh colored to evenly pigmented papule c/w intradermal nevus       Pink pearly papule/plaque c/w basal cell carcinoma      Erythematous hyperkeratotic cursted plaque c/w SCC      Surgical scar with no sign of skin cancer recurrence      Open and closed comedones      Inflammatory papules and pustules      Verrucoid papule consistent consistent with wart     Erythematous eczematous patches and plaques     Dystrophic onycholytic nail with subungual debris c/w onychomycosis     Umbilicated papule    Erythematous-base heme-crusted tan verrucoid plaque consistent with inflamed seborrheic keratosis     Erythematous Silvery Scaling Plaque c/w Psoriasis     See annotation      Assessment / Plan:        SK (seborrheic keratosis)  Discussed with patient the benign nature of these lesions and that no treatment is indicated.    Prn cosmetic hyfrecation of sk on the scalp top.  Costs $80.  Scar and recurrence reviewed.    History of SCC (squamous cell carcinoma) of skin  No signs of recurrence are noted in previous surgical areas or scars.  We will monitor this for now and make adjustments if worsening occurs.  r upper eyelid.  Patient instructed in importance in daily sun protection. Sun avoidance and topical protection discussed.     Patient encouraged to wear hat for all outdoor exposure.     Also discussed sun protective clothing.    Sebaceous hyperplasia  Discussed with patient the benign nature of these lesions and that no treatment is indicated.      Lentigo  Discussed with patient the benign nature of these lesions and that no treatment is indicated.      Eczema, unspecified type  Focal and mild under r eye.  Instructed patient to use petroleum jelly at least daily on affected areas.  Discussed with patient the etiology and pathogenesis of the disease or skin lesion(s) and possible treatments and aggravators.    Patient to watch for recurrence or flares or  worsening and to call the clinic for a follow up appointment for such.  Reviewed with patient different treatment options and associated risks.    Actinic keratosis  Discussed with patient the etiology and pathogenesis of the disease or skin lesion(s) and possible treatments and aggravators.    Discussed all of the following:  Actinic keratosis is a skin growth caused by sun damage. These growths are not cancer, but they may develop into skin cancer (precancerous). Many people get these growths, especially as they age. This is because of cumulative sun exposure over years. Multiple growths are called actinic keratoses.    We will recheck the r cheek under the eye at our follow up appointment but unlikely.  Patient to watch for recurrence or flares or worsening and to call the clinic for a follow up appointment for such.  Patient and or guardian to monitor this area/lesion or these areas/lesions for changes or worsening.  Patient and or guardian to contact us if any changes are noted for such.               Follow up in about 6 months (around 5/26/2020) for TBSE.

## 2019-11-27 LAB
T3 SERPL-MCNC: 66 NG/DL (ref 60–180)
T4 SERPL-MCNC: 4.3 UG/DL (ref 4.5–11.5)

## 2019-11-29 RX ORDER — LEVOTHYROXINE SODIUM 75 UG/1
75 TABLET ORAL DAILY
Qty: 90 TABLET | Refills: 3 | Status: SHIPPED | OUTPATIENT
Start: 2019-11-29 | End: 2019-12-03 | Stop reason: SDUPTHER

## 2019-11-29 RX ORDER — LEVOTHYROXINE SODIUM 75 UG/1
75 TABLET ORAL DAILY
Qty: 30 TABLET | Refills: 11 | Status: SHIPPED | OUTPATIENT
Start: 2019-11-29 | End: 2019-11-29 | Stop reason: SDUPTHER

## 2019-11-29 NOTE — PROGRESS NOTES
Please let Ms. Nelson know that her thyroid levels were increased to 8. We need to increase her levothyroxine to 75 mcg. Make sure she takes this with water, 30 minutes before breakfast and do not take with any other medications. We will recheck in 6 weeks. Thank you

## 2019-11-29 NOTE — TELEPHONE ENCOUNTER
Informed patient of results and rx changes- patient voiced understanding.   Pt is requesting new levothyroxine script sent to [facility] mail order.  Please advise, thank you.       ----- Message from Cheryl Bradley NP sent at 11/29/2019  7:10 AM CST -----  Please let Ms. Nelson know that her thyroid levels were increased to 8. We need to increase her levothyroxine to 75 mcg. Make sure she takes this with water, 30 minutes before breakfast and do not take with any other medications. We will recheck in 6 weeks. Thank you

## 2019-12-02 ENCOUNTER — TELEPHONE (OUTPATIENT)
Dept: ORTHOPEDICS | Facility: CLINIC | Age: 64
End: 2019-12-02

## 2019-12-02 ENCOUNTER — OFFICE VISIT (OUTPATIENT)
Dept: PAIN MEDICINE | Facility: CLINIC | Age: 64
End: 2019-12-02
Payer: MEDICARE

## 2019-12-02 VITALS
DIASTOLIC BLOOD PRESSURE: 88 MMHG | SYSTOLIC BLOOD PRESSURE: 181 MMHG | HEART RATE: 84 BPM | OXYGEN SATURATION: 96 % | TEMPERATURE: 99 F | BODY MASS INDEX: 28.98 KG/M2 | HEIGHT: 67 IN | WEIGHT: 184.63 LBS

## 2019-12-02 DIAGNOSIS — M70.61 GREATER TROCHANTERIC BURSITIS OF RIGHT HIP: ICD-10-CM

## 2019-12-02 DIAGNOSIS — G89.4 CHRONIC PAIN DISORDER: ICD-10-CM

## 2019-12-02 DIAGNOSIS — G89.29 CHRONIC PAIN OF RIGHT KNEE: Primary | ICD-10-CM

## 2019-12-02 DIAGNOSIS — M23.203 DEGENERATIVE TEAR OF MEDIAL MENISCUS OF RIGHT KNEE: ICD-10-CM

## 2019-12-02 DIAGNOSIS — G89.29 CHRONIC RIGHT HIP PAIN: ICD-10-CM

## 2019-12-02 DIAGNOSIS — M25.551 CHRONIC RIGHT HIP PAIN: ICD-10-CM

## 2019-12-02 DIAGNOSIS — M25.561 CHRONIC PAIN OF RIGHT KNEE: Primary | ICD-10-CM

## 2019-12-02 DIAGNOSIS — M53.3 SACROILIAC JOINT PAIN: ICD-10-CM

## 2019-12-02 PROCEDURE — 3079F PR MOST RECENT DIASTOLIC BLOOD PRESSURE 80-89 MM HG: ICD-10-PCS | Mod: CPTII,S$GLB,, | Performed by: ANESTHESIOLOGY

## 2019-12-02 PROCEDURE — 99214 PR OFFICE/OUTPT VISIT, EST, LEVL IV, 30-39 MIN: ICD-10-PCS | Mod: S$GLB,,, | Performed by: ANESTHESIOLOGY

## 2019-12-02 PROCEDURE — 3008F BODY MASS INDEX DOCD: CPT | Mod: CPTII,S$GLB,, | Performed by: ANESTHESIOLOGY

## 2019-12-02 PROCEDURE — 3008F PR BODY MASS INDEX (BMI) DOCUMENTED: ICD-10-PCS | Mod: CPTII,S$GLB,, | Performed by: ANESTHESIOLOGY

## 2019-12-02 PROCEDURE — 3079F DIAST BP 80-89 MM HG: CPT | Mod: CPTII,S$GLB,, | Performed by: ANESTHESIOLOGY

## 2019-12-02 PROCEDURE — 99214 OFFICE O/P EST MOD 30 MIN: CPT | Mod: S$GLB,,, | Performed by: ANESTHESIOLOGY

## 2019-12-02 PROCEDURE — 3077F PR MOST RECENT SYSTOLIC BLOOD PRESSURE >= 140 MM HG: ICD-10-PCS | Mod: CPTII,S$GLB,, | Performed by: ANESTHESIOLOGY

## 2019-12-02 PROCEDURE — 3077F SYST BP >= 140 MM HG: CPT | Mod: CPTII,S$GLB,, | Performed by: ANESTHESIOLOGY

## 2019-12-02 PROCEDURE — 99999 PR PBB SHADOW E&M-EST. PATIENT-LVL III: ICD-10-PCS | Mod: PBBFAC,,, | Performed by: ANESTHESIOLOGY

## 2019-12-02 PROCEDURE — 99999 PR PBB SHADOW E&M-EST. PATIENT-LVL III: CPT | Mod: PBBFAC,,, | Performed by: ANESTHESIOLOGY

## 2019-12-02 RX ORDER — HYDROCODONE BITARTRATE AND ACETAMINOPHEN 7.5; 325 MG/1; MG/1
1 TABLET ORAL 2 TIMES DAILY PRN
Qty: 60 TABLET | Refills: 0 | Status: ON HOLD | OUTPATIENT
Start: 2019-12-02 | End: 2019-12-19 | Stop reason: HOSPADM

## 2019-12-02 NOTE — PROGRESS NOTES
Subjective:     Patient ID: Alessia Nelson is a 64 y.o. female.    Chief Complaint: Pain    Consulted by: Lynne Parsons NP     Disclaimer: This note was generated using voice recognition software.  There may be a typographical errors that were missed during proofreading.      HPI:    Alessia Nelson is a 64 y.o. female who presents today with chronic low back pain.  She has a history of 2 lumbar surgeries in the past as well as a cervical surgery.  She reports bilateral low back pain that radiates into her right buttock and the posterior aspect of her right leg to her knee and sometimes to her calf.  She does have numbness in bilateral feet attributed to diabetic peripheral neuropathy. This is separate from her low back symptoms.   This pain is described in detail below.    Of note, she has had 4 falls, one from her BP dropping, one while she was asleep, and two others of unknown causes.    She carries a diagnosis of fibromyalgia, but she states that she does not believe she has this.    Aggravating factors:  Sitting, standing, walking, bending/twisting, lifting, exercise    Mitigating factors:  Rest, lying down, medication    Previously seeing: Dr. Cardozo, Kyler Watson PA-C, Dr. Brent Lewis    Interval History (6/25/2019):  She returns today for follow up.  She reports that she has not gotten any relief from the radiofrequency ablation yet.  Nothing has been helpful for the pain.    Outside records from [facility]:  Office visit from 04/18/2019:  Show ongoing treatment for her low back pain. Makes a note of a recent SI joint injection with no benefit.  Makes note of an EMG which shows neuropathy.  Shows ongoing treatment with Norco 7.5/325 mg twice daily as needed with no issues.  Other office visits from 02/26/2019, 12/20/2018, 10/25/2018 all show stable treatment with Norco 7.5/325 mg twice daily as needed with no issues.  One office visit from 11/2014 show treatment with Norco 10/325 mg  with no issues    Interval History (7/22/2019):  She returns today for follow up.  She reports that the right GT bursa injection provided greater than 50% benefit at 1st, lasting until this week.  Norco has been helpful for the pain, but methocarbamol 2 tablets at nighttime has not been as helpful.  She has not been able to get the foam roller that we discussed at our last visit.  She wonders if a TENS unit can be helpful.  She saw Dr. Yuan, who ordered a bone scan to look at a possible fracture at L5 near the L5/S1 facet joint.  She has not had this done yet.    Of note, she continues to have difficulty sleeping.  She admits to using her tablet in the bed.  She reports that she was sleeping better before the amitriptyline and lorazepam were stopped; however, she does not wish to restart the that she has not had any falls since stopping these.    Interval History (8/20/2019):  She returns today for follow up.  She reports that she is having worsening stomach pain. This is actually her primary pain complaint today.  This is being treated up by Dr. Fraga.  Norco has been helpful for her low back and knee pain. She continues to take gabapentin and methocarbamol as well with benefit.  Despite this, she continues to have severe pain that is limiting her functioning.  She asks about the report from her bone scan.    Of note, her right bursa is hurting today.  She asks for repeat injection. This was very helpful in June    Interval History (9/23/2019):  She returns today for follow up.  She reports that she canceled the spinal cord stimulator trial because she is nervous about injection on her hardware.  She reports that her right leg/hip continue to be the most painful placed today.  Overall, she reports that nothing helps although, the Norco takes the edge off.    Interval History (10/28/2019):  She returns today for follow up.  She reports that the right hip injection did not provide great relief.  She continues  "to have severe right hip and low back pain.  She wonders if this is due to her knee pain causing her to alter her gait.  She would like to proceed with the right knee injection today.  She also wonders if she can get a new set of x-rays because she had a fall.  She wonders again if she "may have broken something."    Interval History (12/2/2019):  She returns today for follow up.  She reports that her right knee continues to be incredibly limiting.  She has found out that when her right knee hurts, this makes her low back.  She would like to address her right knee.  She would like to see a new orthopedic surgeon as soon as possible.  Her current regimen is helpful, but she does not like taking the narcotics.  She wonders if increasing muscle relaxers may help.    Of note, she is also dealing with several medical conditions, including overactive bladder, chronic cystitis, and pancreatic problems.  The addition of this, her hypothyroidism was found to be uncontrolled.  Her Synthroid dose was increased, but she has not yet picked up the medication.  Overall, she just feels very bad today.    Physical Therapy:  Yes, just finished 2 weeks, she is doing the HEP (stretching) most days.      Non-pharmacologic Treatment:     · Ice/Heat: Heat is helpful (her dog lies on her back)  · TENS: Tried at the chiropractor  · Massage: Yes, in the past, helpful  · Chiropractic care:  Yes, in the past, helpful  · Acupuncture: She is interested in trying this.  · Other: Uses a body pillow for sleep         Pain Medications:         · Currently taking: Tylenol 7462-2566 BID PRN (takes it for before the Norco), Norco 7.5/325 mg BID PRN (she tries to limit this as much as possible), methocarbamol 750-1500 mg QHS, gabapentin 400 mg BID, trazodone 100 mg QHS, Wellbutrin, Lexapro    · Has tried in the past:    · Opioids: Oxycodone (doesn't like)  · NSAIDS: Celebrex (caused diarrhea)  · Tylenol: Tylenol 5703-0370   · Muscle relaxants: " "tizanidine (caused SE of not remembering and seeing things)  · TCAs: Amitriptyline (stopped due to falls)  · SNRIs: Not tried  · Anticonvulsants: On gabapentin  · topical creams: Lotion for nighttime, bath oil  · Other: Lorazepam (stopped this)    Blood thinners: Plavix for     Interventional Therapies:   · L4/5 Epidural steroid injection: For many years.  She is unsure of the last one  · Right SI joint: No benefit  · Right L5-S3 MBB: positive  · 06/10/2019:  Right L5-S3 RFA:  50% benefit  · 06/25/2019:  Right greater trochanteric bursa injection:  50% benefit  · Right knee intra-articular injection:  Limited benefit    Relevant Surgeries:   · 2006: Lumbar surgery  · 2007: Lumbar surgery  · 2012: Lumbar surgery, PISF  · 2006: Cervical surgery    Affecting sleep? Yes, she cannot sleep due to "brain not turning off"    Affecting daily activities? Yes, she is unable to mop, cook, or do most activities    Depressive symptoms? Yes, she has been diagnosed with depression          · SI/HI? No    Work status: Criminal Justice, disabled due to pain    Prescription Monitoring Program database:  Reviewed and consistent with medication use as prescribed.    Last 3 PDI Scores 12/2/2019 10/28/2019 9/23/2019   Pain Disability Index (PDI) 42 30 48       Opioid Risk Score       Value Time User    Opioid Risk Score  4 5/21/2019  3:57 PM Clare Alfonso MA          GENERAL:  Positive for fatigue.  No weight loss, malaise or fevers.  HEENT:   No recent changes in vision or hearing  NECK:  Negative for lumps, no difficulty with swallowing.  RESPIRATORY:  Positive for cough.  Negative for wheezing or shortness of breath, patient denies any recent URI.  CARDIOVASCULAR:  Negative for chest pain, leg swelling or palpitations.  GI:  Positive for diarrhea.  Negative for abdominal discomfort, blood in stools or black stools or change in bowel habits.  MUSCULOSKELETAL:  See HPI.  SKIN:  Positive for color change, texture change.  Negative " for lesions, rash, and itching.  PSYCH:  Positive for anxiety.  No other mood disorder or recent psychosocial stressors.    HEMATOLOGY/LYMPHOLOGY:  Positive for easy bruising.  Negative for prolonged bleeding or swollen nodes.    ENDO:  Positive for hypothyroidism and diabetes.    NEURO:   Positive for headaches, head trauma (due to recent fall), loss of balance, difficulty sleeping.  No history of syncope, paralysis, seizures or tremors.  All other reviewed and negative other than HPI.          Past Medical History:   Diagnosis Date    Acute pancreatitis     CAD (coronary artery disease)     CHF (congestive heart failure)     COPD (chronic obstructive pulmonary disease)     Depression     Diverticulosis     GERD (gastroesophageal reflux disease)     History of bowel resection     Hypertension     Thyroid disease     TIA (transient ischemic attack)        Past Surgical History:   Procedure Laterality Date    ADRENAL GLAND SURGERY      APPENDECTOMY      BACK SURGERY      CHOLECYSTECTOMY      COLONOSCOPY      CORONARY STENT PLACEMENT      CYSTOURETHROSCOPY N/A 11/13/2019    Procedure: CYSTOURETHROSCOPY;  Surgeon: Shun Nuñez MD;  Location: [facility] OR;  Service: Urology;  Laterality: N/A;    ENDOSCOPIC ULTRASOUND OF UPPER GASTROINTESTINAL TRACT N/A 11/25/2019    Procedure: ULTRASOUND, UPPER GI TRACT, ENDOSCOPIC;  Surgeon: Alhaji Bridges MD;  Location: [facility] ([address] [place] [address]);  Service: Endoscopy;  Laterality: N/A;  5 day hold Plavix, Dr Jovanni Serrano - pg  PM prep    HERNIA REPAIR      HYSTERECTOMY      INCISIONAL HERNIA REPAIR      INJECTION OF ANESTHETIC AGENT AROUND NERVE Right 5/27/2019    Procedure: RIGHT L5-S3 MEDIAL BRANCH BLOCKS;  Surgeon: Sneha Aragon MD;  Location: [facility] OR;  Service: Pain Management;  Laterality: Right;  **DO NOT STOP PLAVIX**    instestine      PARATHYROID GLAND SURGERY      3 surgeries    RADIOFREQUENCY ABLATION Right 6/10/2019    Procedure: Radiofrequency  Ablation - RIGHT L3-5 RADIOFREQUENCY ABLATION WITH [employer] COOLIEF THERMAL SYSTEM;  Surgeon: Sneha Aragon MD;  Location: [facility] OR;  Service: Pain Management;  Laterality: Right;  **HOLD PLAVIX x 7 DAYS PRIOR**    UPPER GASTROINTESTINAL ENDOSCOPY         Review of patient's allergies indicates:   Allergen Reactions    Aspirin     Lortab [hydrocodone-acetaminophen] Itching    Phenergan [promethazine]     Promethazine hcl        Current Outpatient Medications   Medication Sig Dispense Refill    acetaminophen (TYLENOL) 325 MG tablet Take 2 tablets (650 mg total) by mouth every 6 (six) hours as needed (or equal to 101 degree F).      allopurinol (ZYLOPRIM) 300 MG tablet       baclofen (LIORESAL) 10 MG tablet Take 1 tablet (10 mg total) by mouth 2 (two) times daily. 60 tablet 6    benazepril (LOTENSIN) 20 MG tablet Take 1 tablet (20 mg total) by mouth once daily. 30 tablet 11    buPROPion (WELLBUTRIN XL) 150 MG TB24 tablet Take 1 tablet (150 mg total) by mouth once daily. 90 tablet 3    clopidogrel (PLAVIX) 75 mg tablet Take 1 tablet (75 mg total) by mouth once daily. 14 tablet 0    colestipol (COLESTID) 1 gram Tab       escitalopram (LEXAPRO) 20 MG tablet Take 20 mg by mouth once daily.      gabapentin (NEURONTIN) 400 MG capsule Take 1 capsule (400 mg total) by mouth 4 (four) times daily. 360 capsule 3    glycopyrrolate (ROBINUL) 1 mg Tab TK 1 T PO BID 90 tablet 3    hydroCHLOROthiazide (HYDRODIURIL) 12.5 MG Tab Take 1 tablet (12.5 mg total) by mouth once daily. 30 tablet 11    levothyroxine (SYNTHROID) 75 MCG tablet Take 1 tablet (75 mcg total) by mouth once daily. 90 tablet 3    metoprolol tartrate (LOPRESSOR) 100 MG tablet Take 1 tablet (100 mg total) by mouth 2 (two) times daily. 90 tablet 3    mirabegron (MYRBETRIQ) 50 mg Tb24 Take 1 tablet (50 mg total) by mouth once daily. 30 tablet 11    ondansetron (ZOFRAN) 4 MG tablet Take 2 tablets (8 mg total) by mouth every 8 (eight) hours as needed  for Nausea. 100 tablet 0    pantoprazole (PROTONIX) 40 MG tablet Take 1 tablet (40 mg total) by mouth once daily. 90 tablet 3    potassium chloride SA (K-DUR,KLOR-CON) 20 MEQ tablet Take 1 tablet (20 mEq total) by mouth once daily. 30 tablet 3    ranitidine (ZANTAC) 150 MG tablet Take 150 mg by mouth 2 (two) times daily.      rosuvastatin (CRESTOR) 20 MG tablet Take 1 tablet (20 mg total) by mouth once daily. 90 tablet 3    traZODone (DESYREL) 100 MG tablet TAKE 1 TABLET (100 MG TOTAL) BY MOUTH EVERY EVENING. 90 tablet 3     No current facility-administered medications for this visit.        Family History   Problem Relation Age of Onset    Stroke Maternal Grandmother     Cancer Maternal Grandfather     Stroke Mother     Heart disease Father        Social History     Socioeconomic History    Marital status: Single     Spouse name: Not on file    Number of children: 0    Years of education: Not on file    Highest education level: Not on file   Occupational History    Not on file   Social Needs    Financial resource strain: Not on file    Food insecurity:     Worry: Not on file     Inability: Not on file    Transportation needs:     Medical: Not on file     Non-medical: Not on file   Tobacco Use    Smoking status: Current Every Day Smoker     Packs/day: 1.00     Years: 15.00     Pack years: 15.00    Smokeless tobacco: Never Used   Substance and Sexual Activity    Alcohol use: No    Drug use: No    Sexual activity: Not Currently   Lifestyle    Physical activity:     Days per week: Not on file     Minutes per session: Not on file    Stress: Not on file   Relationships    Social connections:     Talks on phone: Not on file     Gets together: Not on file     Attends [employer] service: Not on file     Active member of club or organization: Not on file     Attends meetings of clubs or organizations: Not on file     Relationship status: Not on file   Other Topics Concern    Not on file   Social  "History Narrative    Not on file       Objective:     Vitals:    12/02/19 1438   BP: (!) 181/88   Pulse: 84   Temp: 99.1 °F (37.3 °C)   SpO2: 96%   Weight: 83.7 kg (184 lb 10.2 oz)   Height: 5' 7" (1.702 m)   PainSc:   7   PainLoc: Back       GEN:  Well developed, well nourished.  No acute distress. No pain behavior.  HEENT:  No trauma.  Mucous membranes moist.  Nares patent bilaterally.  PSYCH: Normal affect. Thought content appropriate.  CHEST:  Breathing symmetric.  No audible wheezing.  ABD: Soft, non-distended.  SKIN:  Warm, pink, dry.  No rash on exposed areas.    EXT:  No cyanosis, clubbing, or edema.  No color change or changes in nail or hair growth.  NEURO/MUSCULOSKELETAL:  Fully alert, oriented, and appropriate. Speech normal kevin. No cranial nerve deficits.   Gait: Antalgic.      Previous physical exam:  Present trendelenburg sign bilaterally.   Motor Strength: 5/5 motor strength throughout lower extremities.   Sensory: Decreased sensation in bilateral soles of her feet equally.  Increased sensation in a right L5.   Reflexes:  1+ and symmetric throughout.  Downgoing Babinski's bilaterally.  No clonus or spasticity.  L-Spine:  Decreased ROM with pain on flexion. Minimal pain with axial/facet loading bilaterally.  Positive SLR on the left for pain in an S1  SI Joint/Hip: Positive MIKEY on the right.  Negative MIKEY on the left and FADIR bilaterally.  TTP over lumbar paraspinals, right SI joint   No TTP over right lumbar paraspinals, right SI joint.  SI joint/hip:  Positive MIKEY and FADIR on the right  TTP over right GTB    Right knee  Inspection: No erythema, swelling, or redness  ROM: Decreased  Palpation: TTP over medial > lateral joint space.  Positive crepitus. No patellar tendon tenderness   No pes anserine tenderness. No patellofemoral tendon tenderness.  No instability on exam.      Imaging:      The imaging studies listed below were independently reviewed by me, and I agree with the findings " as documented below.     Narrative     EXAMINATION:  XR LUMBAR SPINE 5 VIEW WITH FLEX AND EXT    CLINICAL HISTORY:  axial low back pain;    TECHNIQUE:  Seven views of the lumbar spine were obtained, with AP, lateral, lumbosacral lateral, both oblique, and lateral flexion and extension radiographs submitted.    COMPARISON:  No prior conventional radiographic examinations of the lumbar spine are currently available for comparison purposes.    FINDINGS:  Postoperative changes are identified, including laminectomies at L4 and L5 and a posterior fusion at L4-5, with bilateral pedicle screws seen in both L4 and L5 attached to vertical stabilization bars, and a disc implant/spacer at L4-5 observed.  Minimal anterolisthesis of L4 in relation L5 is identified, alignment at other levels appearing unremarkable.  No significant translational instability at any thoracolumbar level is seen on the dynamic weight- bearing flexion/extension images.  Vertebral body heights are normally maintained, without significant compression deformity at any level.  No significant disc narrowing.  Multilevel marginal vertebral endplate spurring is seen throughout the thoracolumbar spine.  Vertebral endplates are well defined.  No osseous destructive process.  Prominent aortoiliac calcification is seen, without definable aneurysm, and there are surgical clips in the upper abdomen to both sides of midline.      Impression       1. Postoperative changes of L4 and L5 laminectomy and instrumented spinal fusion at L4-5.  2. Multilevel marginal vertebral endplate spurring.      Electronically signed by: Maurilio Myles MD  Date: 07/16/2019  Time: 14:02       Narrative     EXAMINATION:  MRI LUMBAR SPINE WITHOUT CONTRAST    CLINICAL HISTORY:  lumbar stenosis; Postlaminectomy syndrome, not elsewhere classified    TECHNIQUE:  Multiplanar, multisequence MR images were acquired from the thoracolumbar junction to the sacrum without the administration of  contrast.    COMPARISON:  MRI lumbar spine 08/15/2018.    FINDINGS:  Prior posterior fusion and dorsal decompression from L4-L5.  Interbody cage in place.  Normal alignment.  The remaining lumbar vertebral bodies are normal in height and alignment.  No acute fracture.  No marrow edema.    Visualized distal thoracic spinal cord is normal contour and signal intensity.  Conus medullaris terminates at L1-L2.    L1-L2: Mild facet joint hypertrophy.  No central spinal canal or foraminal stenosis.    L2-L3: Broad-based disc bulging with facet joint hypertrophy results in mild bilateral neural foraminal narrowing.  No central spinal canal stenosis.    L3-L4: Broad-based disc bulging with facet joint hypertrophy and ligamentum flavum thickening results in mild central spinal canal stenosis with moderate bilateral neural foraminal narrowing.  Narrowed AP canal diameter measures 11.0 mm.    L4-L5: Prior posterior fusion and dorsal decompression with bilateral laminectomy.  Endplate osteophytosis with facet joint hypertrophy results in severe narrowing of the right neural foramen and moderate narrowing the left neural foramen.  No central spinal canal stenosis.    L5-S1: Previous dorsal decompression with bilateral laminectomy.  Broad-based disc bulging slightly eccentric to the right with facet joint hypertrophy results in severe narrowing of the right neural foramen and moderate narrowing the left neural foramen.  No central spinal canal stenosis.      Impression       1. Prior posterior fusion and dorsal decompression from L4-L5.  2. Degenerative disc disease at L2-L3 resulting in mild bilateral neural foraminal narrowing.  3. Degenerative disc disease at L3-L4 resulting in mild central spinal canal stenosis with moderate bilateral neural foraminal narrowing.  4. Multilevel degenerative disc disease from L4 through S1 resulting in severe narrowing of the right neural foramen and moderate narrowing the left neural  foramen.      Electronically signed by: Jesús Dawson  Date: 05/23/2019  Time: 10:10     CT Abd/Pelvis shows no evidence of significant spinal canal stenosis in the lower thoracic spine      Assessment:     Encounter Diagnoses   Name Primary?    Chronic pain of right knee Yes    Degenerative tear of medial meniscus of right knee     Chronic pain disorder     Chronic right hip pain     Greater trochanteric bursitis of right hip     Sacroiliac joint pain        Plan:     Alessia was seen today for low-back pain.    Diagnoses and all orders for this visit:    Chronic pain of right knee  -     Ambulatory referral/consult to Orthopedics; Future  -     HYDROcodone-acetaminophen (NORCO) 7.5-325 mg per tablet; Take 1 tablet by mouth 2 (two) times daily as needed for Pain.    Degenerative tear of medial meniscus of right knee  -     Ambulatory referral/consult to Orthopedics; Future    Chronic pain disorder  -     HYDROcodone-acetaminophen (NORCO) 7.5-325 mg per tablet; Take 1 tablet by mouth 2 (two) times daily as needed for Pain.    Chronic right hip pain  -     HYDROcodone-acetaminophen (NORCO) 7.5-325 mg per tablet; Take 1 tablet by mouth 2 (two) times daily as needed for Pain.    Greater trochanteric bursitis of right hip  -     HYDROcodone-acetaminophen (NORCO) 7.5-325 mg per tablet; Take 1 tablet by mouth 2 (two) times daily as needed for Pain.    Sacroiliac joint pain  -     HYDROcodone-acetaminophen (NORCO) 7.5-325 mg per tablet; Take 1 tablet by mouth 2 (two) times daily as needed for Pain.         She presents today with a longstanding chronic pain history with several recent medical issues that are complicating the picture.  Continue treatment for other medical conditions.    I do not think that increasing the muscle relaxant would be the most appropriate medication in the setting of a torn meniscus.  This may worsen her condition her.    We discussed the assessment and recommendations.  All available images  were reviewed. We discussed the disease process, prognosis, treatment plan, and risks and benefits. The patient is aware of the risks and benefits of the medications being prescribed, common side effects, and proper usage. The following is the plan we agreed on:     1. Referral to Orthopedics for ongoing management of her right knee pain.  2. Continue medical treatment for her other medical conditions  3. Given her difficulty with her other medical conditions right now, we will hold off on any additional treatment options.  Previously, we had discussed spinal cord stimulation.  4. Can repeat right L5-S3 RFA PRN.  Can also consider diagnostic right sacroiliac joint injection  5. Continue the Norco 7.5/325 mg twice daily as needed.  Currently, the benefits outweigh the risks.  We had a long discussion regarding the risks and benefits of narcotic therapy.  We will work to minimize these as much as possible.  [facility] reviewed and is appropriate.  Opioid contract in place..  UDS done 06/25/2019:  Consistent  6. We had an extensive discussion regarding the expected time course for PT for chronic pain vs after a surgery.  Specifically, we discussed that PT for chronic pain almost always causes a worsening of pain before any improvement, which is generally not seen for 3-6 months, in which time she will likely already be home doing home exercises given that a formal course of PT generally lasts anywhere from 6-12 weeks.  7. Recommend she obtain a foam roller for home use for her bursitis  8. Recommend that she obtain a TENS unit for home use  9. She would be a good candidate for FRP in the future  10. RTC in 8 weeks or sooner if needed.  She may call for the next refill.      Sneha Aragon MD  12/02/2019     The above plan and management options were discussed at length with patient. Patient is in agreement with the above and verbalized understanding. It will be communicated with the referring physician via  electronic record, fax, or mail.

## 2019-12-03 RX ORDER — LEVOTHYROXINE SODIUM 75 UG/1
75 TABLET ORAL DAILY
Qty: 90 TABLET | Refills: 3 | Status: SHIPPED | OUTPATIENT
Start: 2019-12-03 | End: 2020-05-12 | Stop reason: SDUPTHER

## 2019-12-03 NOTE — TELEPHONE ENCOUNTER
----- Message from Elvira Buitrago sent at 12/3/2019  1:08 PM CST -----  Contact: self  Type:  RX Refill Request    Who Called:  Patient   Refill or New Rx:  2 week refill   RX Name and Strength: levothyroxine (SYNTHROID) 75 MCG tablet   How is the patient currently taking it? (ex. 1XDay):  1XDay  Is this a 30 day or 90 day RX: 2 wk supply (get rest from [facility])  Preferred Pharmacy with phone number:    [id number] - [place], [place] - [address] [place] [address] AT [facility] [address]  [address]  [place] [address]  Phone: [phone] Fax: [phone]  Local or Mail Order:  Local   Ordering Provider:  Anali Bradley  Best Call Back Number:  [phone] (home)   Additional Information: Patient has been out for a couple of days

## 2019-12-04 DIAGNOSIS — K22.4 ESOPHAGEAL SPASM: ICD-10-CM

## 2019-12-05 ENCOUNTER — TELEPHONE (OUTPATIENT)
Dept: CARDIOLOGY | Facility: CLINIC | Age: 64
End: 2019-12-05

## 2019-12-05 ENCOUNTER — OFFICE VISIT (OUTPATIENT)
Dept: ORTHOPEDICS | Facility: CLINIC | Age: 64
End: 2019-12-05
Payer: MEDICARE

## 2019-12-05 VITALS
HEART RATE: 84 BPM | HEIGHT: 67 IN | BODY MASS INDEX: 28.98 KG/M2 | SYSTOLIC BLOOD PRESSURE: 153 MMHG | WEIGHT: 184.63 LBS | DIASTOLIC BLOOD PRESSURE: 72 MMHG

## 2019-12-05 DIAGNOSIS — G89.29 CHRONIC PAIN OF RIGHT KNEE: ICD-10-CM

## 2019-12-05 DIAGNOSIS — M17.11 UNILATERAL PRIMARY OSTEOARTHRITIS, RIGHT KNEE: ICD-10-CM

## 2019-12-05 DIAGNOSIS — M25.561 CHRONIC PAIN OF RIGHT KNEE: ICD-10-CM

## 2019-12-05 PROCEDURE — 3077F SYST BP >= 140 MM HG: CPT | Mod: CPTII,S$GLB,, | Performed by: ORTHOPAEDIC SURGERY

## 2019-12-05 PROCEDURE — 99999 PR PBB SHADOW E&M-EST. PATIENT-LVL IV: ICD-10-PCS | Mod: PBBFAC,,, | Performed by: ORTHOPAEDIC SURGERY

## 2019-12-05 PROCEDURE — 99204 OFFICE O/P NEW MOD 45 MIN: CPT | Mod: S$GLB,,, | Performed by: ORTHOPAEDIC SURGERY

## 2019-12-05 PROCEDURE — 99204 PR OFFICE/OUTPT VISIT, NEW, LEVL IV, 45-59 MIN: ICD-10-PCS | Mod: S$GLB,,, | Performed by: ORTHOPAEDIC SURGERY

## 2019-12-05 PROCEDURE — 3008F BODY MASS INDEX DOCD: CPT | Mod: CPTII,S$GLB,, | Performed by: ORTHOPAEDIC SURGERY

## 2019-12-05 PROCEDURE — 3078F PR MOST RECENT DIASTOLIC BLOOD PRESSURE < 80 MM HG: ICD-10-PCS | Mod: CPTII,S$GLB,, | Performed by: ORTHOPAEDIC SURGERY

## 2019-12-05 PROCEDURE — 99999 PR PBB SHADOW E&M-EST. PATIENT-LVL IV: CPT | Mod: PBBFAC,,, | Performed by: ORTHOPAEDIC SURGERY

## 2019-12-05 PROCEDURE — 3008F PR BODY MASS INDEX (BMI) DOCUMENTED: ICD-10-PCS | Mod: CPTII,S$GLB,, | Performed by: ORTHOPAEDIC SURGERY

## 2019-12-05 PROCEDURE — 3077F PR MOST RECENT SYSTOLIC BLOOD PRESSURE >= 140 MM HG: ICD-10-PCS | Mod: CPTII,S$GLB,, | Performed by: ORTHOPAEDIC SURGERY

## 2019-12-05 PROCEDURE — 3078F DIAST BP <80 MM HG: CPT | Mod: CPTII,S$GLB,, | Performed by: ORTHOPAEDIC SURGERY

## 2019-12-05 RX ORDER — GLYCOPYRROLATE 1 MG/1
TABLET ORAL
Qty: 180 TABLET | Refills: 0 | Status: SHIPPED | OUTPATIENT
Start: 2019-12-05 | End: 2020-03-04 | Stop reason: CLARIF

## 2019-12-05 RX ORDER — MUPIROCIN 20 MG/G
OINTMENT TOPICAL
Status: CANCELLED | OUTPATIENT
Start: 2019-12-05

## 2019-12-05 NOTE — TELEPHONE ENCOUNTER
LVM X1      Would like me to send over your standard surgical release?    Please advise.  Thank you!      ----- Message from Magda Street LPN sent at 12/5/2019 12:17 PM CST -----  Patient is needing cardiology clearance for R TKA . DOS 12/18/19. Are you able to provide preoperative clearance prior to that date?

## 2019-12-05 NOTE — LETTER
December 5, 2019      Sneha Aragon MD  [address]  [address]  [facility] [address]           [facility]  [address] [place] [address] MARY BARONE [address] [place] [id number]  Phone: [phone]          Patient: Alessia Nelson   MR Number: [id number]   YOB: 1955   Date of Visit: 12/5/2019       Dear Dr. Sneha Aragon:    Thank you for referring Alessia Nelson to me for evaluation. Attached you will find relevant portions of my assessment and plan of care.    If you have questions, please do not hesitate to call me. I look forward to following Alessia Nelson along with you.    Sincerely,    Ike Briceño II, MD    Enclosure  CC:  No Recipients    If you would like to receive this communication electronically, please contact [email or web address] or [phone] to request more information on [employer] Link access.    For providers and/or their staff who would like to refer a patient to [facility], please contact us through our one-stop-shop provider referral line, [facility], at [phone].    If you feel you have received this communication in error or would no longer like to receive these types of communications, please e-mail [email or web address]

## 2019-12-05 NOTE — PROGRESS NOTES
CC:  A 64-year-old female presents for evaluation of right knee pain. She has previously seen an orthopedic surgeon in [place] and was diagnosed with osteoarthritis of the right knee. The patient reports a complicated history with the right knee.  She has had 2 surgeries previously.  The 1st was sounds like it was an arthroscopic lateral release.  The 2nd 1 she states they went in with the scope to clean up some cartilage.  She currently takes narcotic pain medicine and muscle relaxers without relief of her right knee pain.  She has had steroid injections with her pain management doctor and they no longer provide any pain relief.  She has tried bracing and offloading with a cane also all without relief.  She is having pain on a daily basis and pain keeps her up at night.  She cannot take NSAIDs secondary to cardiac disease. She rates her pain as an 8/10    ROS:    Constitution: Denies chills, fever, and sweats.  HENT: Denies headaches or blurry vision.  Cardiovascular: Denies chest pain or irregular heart beat.  Respiratory: Denies cough or shortness of breath.  Gastrointestinal: Denies abdominal pain, nausea, or vomiting.  Genitourinary:  Denies urinary incontinence, bladder and kidney issues  Musculoskeletal:  Denies muscle cramps.  Positive for right knee pain  Neurological: Denies dizziness or focal weakness.  Psychiatric/Behavioral: Normal mental status.  Hematologic/Lymphatic: Denies bleeding problem or easy bruising/bleeding.  Skin: Denies rash or suspicious lesions.    Physical examination     Gen - No acute distress   Eyes - Extraoccular motions intact, pupils equally round and reactive to light and accommodation   ENT - normocephalic, atruamtic, oropharynx clear   Neck - Supple, no abnormal masses   Cardiovascular - regular rate and rhythm   Pulmonary - clear to auscultation bilaterally   Abdomen - soft, non-tender, non-distended, positive bowel sounds   Psych - The patient is alert and oriented x3  with normal mood and affect    Examination of the Right Lower Extremity:     Motor function is intact distally EHL/FHL/TA/luz marina   +2 dorsalis pedis and posterior tibial pulses   Sensation to light touch intact distally dorsal, plantar, and first web space     Examination of the Right knee:    ROM 0 - 150   Effusion negative  Tenderness to palpation at the joint line positive  Pain during range of motion positive  Crepitation during range of motion positive     positive increased pain noted with flexion past 90   positive antalgic gait noted   negative Lachman's Test   negative Anterior Drawer Test   negative Posterior Drawer Test   positive McMurrays Test   positive Disco Test   negative Varus/Valgus instability    X-rays were examined and personally reviewed by me.  Three views of the right knee dated 08/13/2019 are available for review.  It shows mild joint space narrowing with early periarticular osteophyte formation in both medial and lateral compartments. Subchondral sclerosis is also noted    Dx:  Osteoarthritis of the right knee that has failed prolonged non operative treatments.    Plan:  We discussed treatment options.  My recommendations for knee replacement surgery. Risks and benefits were explained to the patient. She verbalized understanding and would like to proceed.  We will send her for preoperative clearance is and schedule her tentatively for the next available date that is convenient for her.

## 2019-12-06 NOTE — TELEPHONE ENCOUNTER
Spoke with patient regarding surgical clearance letter as attached.  Pt stated that Dr. Merrill is an [facility] provider.    Alessia Nelson  : 1955    MRN: [id number]      Date: 2019    Patient has been seen within the last 6 months and is stable and asymptomatic from the cardiovascular standpoint can be clear for surgery and anesthesia with acceptable cardiac risk.  May be at increased risk for complications due to the patients co-morbidities.  In regard to holding the antiplatelet drugs or warfarin / coumadin (ASA, Plavix, Pletal, Brilinta), if no hospitalization for acute coronary syndrome in the past 12 months, can hold for 5-7 days at the discretion of the surgeon.  If the patient is on NOAC (Eliquis, Xarelto, Pradaxa) can hold for 1-2 days per surgeon discretion. Certain recommend resumption of these medications ASAP post procedure. If an official clearance is needed, then will need office visit.    Regards,          Jovanni Serrano MD, FACC

## 2019-12-11 ENCOUNTER — TELEPHONE (OUTPATIENT)
Dept: FAMILY MEDICINE | Facility: CLINIC | Age: 64
End: 2019-12-11

## 2019-12-11 ENCOUNTER — HOSPITAL ENCOUNTER (OUTPATIENT)
Dept: RADIOLOGY | Facility: HOSPITAL | Age: 64
Discharge: HOME | End: 2019-12-11
Attending: ORTHOPAEDIC SURGERY
Payer: MEDICARE

## 2019-12-11 ENCOUNTER — HOSPITAL ENCOUNTER (OUTPATIENT)
Dept: PREADMISSION TESTING | Facility: HOSPITAL | Age: 64
Discharge: HOME | End: 2019-12-11
Attending: ORTHOPAEDIC SURGERY
Payer: MEDICARE

## 2019-12-11 VITALS — HEIGHT: 67 IN | BODY MASS INDEX: 29.03 KG/M2 | WEIGHT: 185 LBS

## 2019-12-11 DIAGNOSIS — G89.29 CHRONIC PAIN OF RIGHT KNEE: Primary | ICD-10-CM

## 2019-12-11 DIAGNOSIS — M17.11 UNILATERAL PRIMARY OSTEOARTHRITIS, RIGHT KNEE: ICD-10-CM

## 2019-12-11 DIAGNOSIS — M25.561 CHRONIC PAIN OF RIGHT KNEE: Primary | ICD-10-CM

## 2019-12-11 LAB
ABO + RH BLD: NORMAL
ANION GAP SERPL CALC-SCNC: 9 MMOL/L (ref 8–16)
BASOPHILS # BLD AUTO: 0.05 K/UL (ref 0–0.2)
BASOPHILS NFR BLD: 0.6 % (ref 0–1.9)
BLD GP AB SCN CELLS X3 SERPL QL: NORMAL
BUN SERPL-MCNC: 15 MG/DL (ref 8–23)
CALCIUM SERPL-MCNC: 9.7 MG/DL (ref 8.7–10.5)
CHLORIDE SERPL-SCNC: 104 MMOL/L (ref 95–110)
CO2 SERPL-SCNC: 29 MMOL/L (ref 23–29)
CREAT SERPL-MCNC: 0.9 MG/DL (ref 0.5–1.4)
DIFFERENTIAL METHOD: ABNORMAL
EOSINOPHIL # BLD AUTO: 0.2 K/UL (ref 0–0.5)
EOSINOPHIL NFR BLD: 1.9 % (ref 0–8)
ERYTHROCYTE [DISTWIDTH] IN BLOOD BY AUTOMATED COUNT: 14.1 % (ref 11.5–14.5)
EST. GFR  (AFRICAN AMERICAN): >60 ML/MIN/1.73 M^2
EST. GFR  (NON AFRICAN AMERICAN): >60 ML/MIN/1.73 M^2
GLUCOSE SERPL-MCNC: 101 MG/DL (ref 70–110)
HCT VFR BLD AUTO: 37 % (ref 37–48.5)
HGB BLD-MCNC: 11.7 G/DL (ref 12–16)
IMM GRANULOCYTES # BLD AUTO: 0.03 K/UL (ref 0–0.04)
LYMPHOCYTES # BLD AUTO: 2.3 K/UL (ref 1–4.8)
LYMPHOCYTES NFR BLD: 27.8 % (ref 18–48)
MCH RBC QN AUTO: 29.8 PG (ref 27–31)
MCHC RBC AUTO-ENTMCNC: 31.6 G/DL (ref 32–36)
MCV RBC AUTO: 94 FL (ref 82–98)
MONOCYTES # BLD AUTO: 0.6 K/UL (ref 0.3–1)
MONOCYTES NFR BLD: 7 % (ref 4–15)
NEUTROPHILS # BLD AUTO: 5.1 K/UL (ref 1.8–7.7)
NEUTROPHILS NFR BLD: 62.3 % (ref 38–73)
NRBC BLD-RTO: 0 /100 WBC
PLATELET # BLD AUTO: 208 K/UL (ref 150–350)
PMV BLD AUTO: 9.7 FL (ref 9.2–12.9)
POTASSIUM SERPL-SCNC: 3.7 MMOL/L (ref 3.5–5.1)
RBC # BLD AUTO: 3.92 M/UL (ref 4–5.4)
SODIUM SERPL-SCNC: 142 MMOL/L (ref 136–145)
WBC # BLD AUTO: 8.24 K/UL (ref 3.9–12.7)

## 2019-12-11 PROCEDURE — 36415 COLL VENOUS BLD VENIPUNCTURE: CPT

## 2019-12-11 PROCEDURE — 71046 X-RAY EXAM CHEST 2 VIEWS: CPT | Mod: 26,,, | Performed by: RADIOLOGY

## 2019-12-11 PROCEDURE — 86850 RBC ANTIBODY SCREEN: CPT

## 2019-12-11 PROCEDURE — 85025 COMPLETE CBC W/AUTO DIFF WBC: CPT

## 2019-12-11 PROCEDURE — 71046 X-RAY EXAM CHEST 2 VIEWS: CPT | Mod: TC,FY

## 2019-12-11 PROCEDURE — 71046 XR CHEST PA AND LATERAL: ICD-10-PCS | Mod: 26,,, | Performed by: RADIOLOGY

## 2019-12-11 PROCEDURE — 80048 BASIC METABOLIC PNL TOTAL CA: CPT

## 2019-12-11 RX ORDER — CYANOCOBALAMIN 1000 UG/ML
1000 INJECTION, SOLUTION INTRAMUSCULAR; SUBCUTANEOUS
COMMUNITY
End: 2020-05-04

## 2019-12-11 NOTE — OR NURSING
Patient stated having a blood clot after her bowel resection surgery in 1986. Informed Dr. Briceño and his staff.  Also informed Brenden Hernández nurse Navigator.  Added to patients history.

## 2019-12-11 NOTE — PRE ADMISSION SCREENING
"               CJR Risk Assessment Scale    Patient Name: Alessia Nelson  YOB: 1955  MRN: [id number]            RIsk Factor Measure Recommendation Patient Data Scale/Score   BMI >40 Reconsider surgery, weight loss   Estimated body mass index is 28.98 kg/m² as calculated from the following:    Height as of this encounter: 5' 7" (1.702 m).    Weight as of this encounter: 83.9 kg (185 lb).   [] 0 = 1 - 24.9  [x] 1 = 25-29.9  [] 2 = 30-34.9  [] 3 = 35-39.9  [] 4 = 40-44.9  [] 5 = 45-99.9   Hemoglobin AIC (if applicable) >9 Delay surgery until DM under control  Refer for:  · Nutrition Therapy  · Exercise   · Medication    No results found for: LABA1C, HGBA1C    Lab Results   Component Value Date     12/11/2019      [] 0 = 4.0-5.6  [] 1 = 5.7-6.4  [] 2 = 6.5-6.9  [] 3 = 7.0-7.9  [] 4 = 8.0-8.9  [] 5 = 9.0-12   Hemoglobin (Anemia) <9 Delay surgery   Correct anemia Lab Results   Component Value Date    HGB 11.7 (L) 12/11/2019    [] 20 - <9.0                    Albumin <3 Delay surgery &Workup Lab Results   Component Value Date    ALBUMIN 4.4 08/13/2019    [] 20 - <3.0   Smoking Cessation >4 Weeks Delay Surgery  Refer to OP Cessation Class     [x] 20 - current smoker                                __1___ PPD                    Hx of MI, PE, Arrhythmia, CVA, DVT <30 Days Delay Surgery    N/A [] 20      Infection Variable Delay surgery and re-evaluate   N/A [] 20 - recent/current infection     Depression (PHQ) >10 out of 27 Delay Surgery and re-evaluate  Medication  Counseling              [x] 0     []1     []2     []3      []4      [] 5                    (1-4)      (5-9)  (10-14)  (15-19)   (20-27)     Memory Impairment & Memory loss (Mini-Cog Screening Tool) Advanced dementia and/or Parkinson's Reconsider surgery     [x] 0     []1     []2     []3     []4     [] 5     Physical Conditioning (Modified AM-PAC Per Physical Therapy at [facility]) Unable to ambulate on day of surgery Delay surgery and " re-evaluate  Pre-Rehabilitation   (PT evaluation)       []  0   [x]4       []8     []12        []16     []20       (<20%)   (<40%)   (<60%)   (<80% )    (>80%)     Home Environment/Caregiver support  (Per /Navigator Interview)    Availability of basic services and/or approprate assistance during post-operative period Delay surgery and re-evaluate  Safe home environment  Health   1 week post-surgery  Transportation  availability  Ability to obtain DME/Medications post-op    [x] 0     []1     []2     []3     []4     [] 5  [] 0     []1     []2     [x]3     []4     [] 5  [x] 0     []1     []2     []3     []4     [] 5  [x] 0     []1     []2     []3     []4     [] 5         MD Contact: Dr. Ike Briceño Comments:  Total Score:  28

## 2019-12-11 NOTE — DISCHARGE INSTRUCTIONS
To confirm, Your doctor has instructed you that surgery is scheduled for: 1/18/2019    Please report to [facility], Registration the morning of surgery. You must check-in and receive a wristband before going to your procedure.    Pre-Op will call the afternoon prior to surgery between 1:00 and 6:00 PM with the final arrival time.  Phone number: [phone]    PLEASE NOTE:  The surgery schedule has many variables which may affect the time of your surgery case.  Family members should be available if your surgery time changes.  Plan to be here the day of your procedure between 4-6 hours.    MEDICATIONS:  TAKE ONLY THESE MEDICATIONS WITH A SMALL SIP OF WATER THE MORNING OF YOUR PROCEDURE:  ALLPURINOL, BACLOFEN, METOPROLOL, GABAPENTIN, HYDROCODONE IF NEEDED, LEVOTHYROXINE, MIRABEGRON, PROTONIX, ROUSUVASTATIN        DO NOT TAKE THESE MEDICATIONS 5-7 DAYS PRIOR to your procedure or per your surgeon's request:   ASPIRIN, ALEVE, ADVIL, IBUPROFEN, FISH OIL VITAMIN E, HERBALS  (May take Tylenol)    STOP PLAVIX 5 DAYS PRIOR TO SURGERY PER DR. OVIEDO  ONLY if you are prescribed any types of blood thinners such as:  Aspirin, Coumadin, Plavix, Pradaxa, Xarelto, Aggrenox, Effient, Eliquis, Savasya, Brilinta, or any other, ask your surgeon whether you should stop taking them and how long before surgery you should stop.  You may also need to verify with the prescribing physician if it is ok to stop your medication.      INSTRUCTIONS IMPORTANT!!  · Do not eat or drink anything between midnight and the time of your procedure- this includes gum, mints, and candy.  · Do not smoke or drink alcoholic beverages 24 hours prior to your procedure.  · Shower the night before AND the morning of your procedure with a Chlorhexidine wash such as Hibiclens or Dial antibacterial soap from the neck down.  Do not get it on your face or in your eyes.  You may use your own shampoo and face wash. This helps your skin to be as bacteria  free as possible.    · If you wear contact lenses, dentures, hearing aids or glasses, bring a container to put them in during surgery and give to a family member for safe keeping.  Please leave all jewelry, piercing's and valuables at home.   · DO NOT remove hair from the surgery site.  Do not shave the incision site unless you are given specific instructions to do so.    · ONLY if you have been diagnosed with sleep apnea please bring your C-PAP machine.  · ONLY if you wear home oxygen please bring your portable oxygen tank the day of your procedure.  · ONLY if you have a history of OPEN HEART SURGERY you will need a clearance from your Cardiologist per Anesthesia.      · ONLY for patients requiring bowel prep, written instructions will be given by your doctor's office.  · ONLY if you have a neuro stimulator, please bring the controller with you the morning of surgery  · ONLY if a type and screen test is needed before surgery, please return: DONE TODAY  · If your doctor has scheduled you for an overnight stay, bring a small overnight bag with any personal items you need.  · Make arrangements in advance for transportation home by a responsible adult.  It is not safe to drive a vehicle during the 24 hours after anesthesia.      · Visiting hours are 10:00AM to 8:30PM.  For the safety of all patients, visitors under the age of 12 are not allowed above the first floor of the hospital.    · All [facility] facilities and properties are tobacco free.  Smoking is NOT allowed.       If you have any questions about these instructions, call Pre-Op Admit  Nursing at [phone] or the Pre-Op Day Surgery Unit at [phone].

## 2019-12-11 NOTE — TELEPHONE ENCOUNTER
----- Message from Taylor Moreno sent at 12/11/2019 10:08 AM CST -----      Type:  Sooner Apoointment Request    Caller is requesting a sooner appointment.  Caller declined first available appointment listed below.  Caller will not accept being placed on the waitlist and is requesting a message be sent to doctor.    Name of Caller:  pt  When is the first available appointment?    Pt  Was  offed  Next  Available    On  12/19 // pt  Stated she  Needs to  Be  Fitted in  sooner  Best Call Back Number:  [phone]  Additional Information:   Calling to   Speak to the  Nurse about  Fitting pt in

## 2019-12-11 NOTE — PRE ADMISSION SCREENING
Patient Name: Alessia Nelson  YOB: 1955   MRN: [id number]     [facility]   Basic Mobility Inpatient Short Form 6 Clicks         How much difficulty does the patient currently have  Unable  A Lot  A Little  None      1. Turning over in bed (including adjusting bedclothes, sheets and blankets)?     1 []    2 []    3 [x]    4 []        2. Sitting down on and standing up from a chair with arms (e.g., wheelchair, bedside commode, etc.)     1 []  2 []  3 [x]     4 []      3. Moving from lying on back to sitting on the side of the bed?     1 []  2 []  3 []    4 [x]    How much help from another person does the patient currently need  Total  A Lot  A Little  None      4. Moving to and from a bed to a chair (including a wheelchair)?    1 []  2 []  3 []    4 [x]      5. Need to walk in hospital room?    1 []  2 []  3 []    4 [x]      6. Climbing 3-5 steps with a railing?    1 []  2 []  3 []    4 [x]       Raw Score:     22             CMS 0-100% Score:   20.91         %   Standardized Score:    53.28           CMS Modifier:       CJ                                   [facility]   Basic Mobility Inpatient Short Form 6 Clicks Score Conversion Table*         *Use this form to convert -PAC Basic Mobility Inpatient Raw Scores.   [facility] Inpatient Basic Mobility Short Form Scoring Example   1. Add the number values associated with the response to each item. For example, items totals yield a Raw Score of 21.   2. Match the raw score to the t-Scale scores (t-Scale score = 50.25, SE = 4.69).   3. Find the associated CMS % (CMS % = 28.97%).   4. Locate the correct CMS Functional Modifier Code, or G Code (G code = CJ)     NOTE: Each -PAC Short Form has a separate conversion table. Make sure that you use the correct conversion table.       Instruction Manual - page 45 contains conversion table

## 2019-12-11 NOTE — PRE ADMISSION SCREENING
JOINT CAMP ASSESSMENT    Name Alessia Nelson   MRN [id number]    Age/Sex 64 y.o. female    Surgeon Dr. Ike Briceño II   Joint Camp Date 12/11/2019   Surgery Date 12/18/2019   Procedure Right Knee Arthroplasty   Insurance Payor: [employer]ICARE / Plan: [employer] MEDICARE HMO / Product Type: Capitation /    Care Team Patient Care Team:  Cheryl Bradley NP as PCP - General (Family Medicine)  Tara Kay LPN as Care Coordinator (Family Medicine)  Edwin Borjas MD as Consulting Physician (Gastroenterology)  Ike Briceño II, MD as Consulting Physician (Orthopedic Surgery)    Pharmacy   [id number] - [place], [place] - [address]  [address]  [facility] [place] [address]  Phone: [phone] Fax: [phone]    [facility] [address] [facility]  [address] [facility] [address]  Phone: [phone] Fax: [phone]     AM-PAC Score   22   Risk Assessment Score 28     Past Medical History:   Diagnosis Date    Acute pancreatitis     CAD (coronary artery disease)     CHF (congestive heart failure)     COPD (chronic obstructive pulmonary disease)     Depression     Diverticulosis     Encounter for blood transfusion     GERD (gastroesophageal reflux disease)     History of blood clots     1986 AFTER BOWEL RESCETION    History of bowel resection     Hypertension     Peritonitis     Seizures     HAD A SEIZURE FROM PHENERGAN     Thyroid disease     TIA (transient ischemic attack)        Past Surgical History:   Procedure Laterality Date    ADRENAL GLAND SURGERY      APPENDECTOMY      BACK SURGERY      CHOLECYSTECTOMY      COLONOSCOPY      CORONARY STENT PLACEMENT      CYSTOURETHROSCOPY N/A 11/13/2019    Procedure: CYSTOURETHROSCOPY;  Surgeon: Shun Nuñez MD;  Location: [facility] OR;  Service: Urology;  Laterality: N/A;    ENDOSCOPIC ULTRASOUND OF UPPER GASTROINTESTINAL TRACT N/A 11/25/2019     Procedure: ULTRASOUND, UPPER GI TRACT, ENDOSCOPIC;  Surgeon: Alhaji Bridges MD;  Location: [facility] ENDO (2ND FLR);  Service: Endoscopy;  Laterality: N/A;  5 day hold Plavix, Dr Jovanni Serrano - pg  PM prep    HERNIA REPAIR      HYSTERECTOMY      INCISIONAL HERNIA REPAIR      INJECTION OF ANESTHETIC AGENT AROUND NERVE Right 5/27/2019    Procedure: RIGHT L5-S3 MEDIAL BRANCH BLOCKS;  Surgeon: Sneha Aragon MD;  Location: [facility] OR;  Service: Pain Management;  Laterality: Right;  **DO NOT STOP PLAVIX**    instestine      PARATHYROID GLAND SURGERY      3 surgeries    RADIOFREQUENCY ABLATION Right 6/10/2019    Procedure: Radiofrequency Ablation - RIGHT L3-5 RADIOFREQUENCY ABLATION WITH HALYARD COOLIEF THERMAL SYSTEM;  Surgeon: Sneha Aragon MD;  Location: [facility] OR;  Service: Pain Management;  Laterality: Right;  **HOLD PLAVIX x 7 DAYS PRIOR**    UPPER GASTROINTESTINAL ENDOSCOPY           Home Enviroment     Living Arrangement: Lives alone  Home Environment: 1-story house/ trailer, number of outside stairs: 1, walk-in shower  Home Safety Concerns: Pets in the home: dogs (1).    DISCHARGE CAREGIVER/SUPPORT SYSTEM     Identified post-op caregiver: Patient has lives alone and children / family / friends to help, neighbor, cousin, and patient to hire a sitter..  Patient's caregiver(s) will be able to provide physical assistance. Patient will not have someone to assist overnight.      Caregiver present at pre-op interview:  No      PRE-OPERATIVE FUNCTIONAL STATUS     Employment: Disabled    Pre-op Functional Status: Patient is independent with mobility/ambulation, transfers, ADL's, IADL's.    Use of assistive device for ambulation: none  ADL: self care  ADL Limitations: difficulty with walking  Medical Restrictions: Decreased range of motions in extremities    POTENTIAL BARRIERS TO DISCHARGE/POTENTIAL POST-OP COMPLICATIONS     Patient with decreased caregiver support. Patient states that her neighbor will check on her  and that she will be going to a cousin's home for Tien. Patient is looking to hire a sitter for a couple of hours each day but will have no one overnight. Patient is a current everyday smoker which could delay wound healing. Patient with hx of TIA, seizures, HTN, previous blood clot after bowel resection. Consider anticoagulation therapy choice as patient is also allergic to aspirin. Patient had previous blood transfusion, COPD, CHF, and CAD with stent placement and long-term anticoagulation therapy (Plavix).    DISCHARGE PLAN     Expected LOS of 2 days or less for joint replacement discussed with patient. We also discussed a discharge path of HH for approximately two weeks with a transition to outpatient PT on the third week given no post-op complications.      Patient in agreement with discharge plan: Yes    Discharge to: Discharge home with home health (PT/OT) x2 weeks with transition to outpatient PT     HH:  [facility]     OP PT: [facility] ([place], [place])     Home DME: bedside commode and shower chair    Needed DME at D/C: rolling walker and assistive device kit     Rx: Per Dr. Ike Briceño at discharge.     Meds to Beds: N/A  Patient expected to discharge on hospitalist reccomendations due to Aspirin allergy. for DVT prophylaxis.

## 2019-12-12 ENCOUNTER — OFFICE VISIT (OUTPATIENT)
Dept: FAMILY MEDICINE | Facility: CLINIC | Age: 64
End: 2019-12-12
Payer: MEDICARE

## 2019-12-12 VITALS
TEMPERATURE: 97 F | HEART RATE: 64 BPM | WEIGHT: 188.5 LBS | DIASTOLIC BLOOD PRESSURE: 73 MMHG | BODY MASS INDEX: 29.58 KG/M2 | SYSTOLIC BLOOD PRESSURE: 135 MMHG | HEIGHT: 67 IN | OXYGEN SATURATION: 97 % | RESPIRATION RATE: 18 BRPM

## 2019-12-12 DIAGNOSIS — K21.9 GASTROESOPHAGEAL REFLUX DISEASE, ESOPHAGITIS PRESENCE NOT SPECIFIED: ICD-10-CM

## 2019-12-12 DIAGNOSIS — F51.01 PRIMARY INSOMNIA: ICD-10-CM

## 2019-12-12 DIAGNOSIS — G89.4 CHRONIC PAIN SYNDROME: ICD-10-CM

## 2019-12-12 DIAGNOSIS — E03.9 HYPOTHYROIDISM, UNSPECIFIED TYPE: ICD-10-CM

## 2019-12-12 DIAGNOSIS — Z87.19 HX OF PANCREATITIS: ICD-10-CM

## 2019-12-12 DIAGNOSIS — I10 ESSENTIAL HYPERTENSION: Primary | ICD-10-CM

## 2019-12-12 DIAGNOSIS — E78.5 HYPERLIPIDEMIA, UNSPECIFIED HYPERLIPIDEMIA TYPE: ICD-10-CM

## 2019-12-12 DIAGNOSIS — F32.89 OTHER DEPRESSION: ICD-10-CM

## 2019-12-12 PROCEDURE — 3078F PR MOST RECENT DIASTOLIC BLOOD PRESSURE < 80 MM HG: ICD-10-PCS | Mod: CPTII,S$GLB,, | Performed by: NURSE PRACTITIONER

## 2019-12-12 PROCEDURE — 3075F PR MOST RECENT SYSTOLIC BLOOD PRESS GE 130-139MM HG: ICD-10-PCS | Mod: CPTII,S$GLB,, | Performed by: NURSE PRACTITIONER

## 2019-12-12 PROCEDURE — 3075F SYST BP GE 130 - 139MM HG: CPT | Mod: CPTII,S$GLB,, | Performed by: NURSE PRACTITIONER

## 2019-12-12 PROCEDURE — 99214 OFFICE O/P EST MOD 30 MIN: CPT | Mod: 25,S$GLB,, | Performed by: NURSE PRACTITIONER

## 2019-12-12 PROCEDURE — 90714 TD VACCINE GREATER THAN OR EQUAL TO 7YO WITH PRESERVATIVE IM: ICD-10-PCS | Mod: S$GLB,,, | Performed by: NURSE PRACTITIONER

## 2019-12-12 PROCEDURE — 3008F BODY MASS INDEX DOCD: CPT | Mod: CPTII,S$GLB,, | Performed by: NURSE PRACTITIONER

## 2019-12-12 PROCEDURE — 3008F PR BODY MASS INDEX (BMI) DOCUMENTED: ICD-10-PCS | Mod: CPTII,S$GLB,, | Performed by: NURSE PRACTITIONER

## 2019-12-12 PROCEDURE — 99214 PR OFFICE/OUTPT VISIT, EST, LEVL IV, 30-39 MIN: ICD-10-PCS | Mod: 25,S$GLB,, | Performed by: NURSE PRACTITIONER

## 2019-12-12 PROCEDURE — 99999 PR PBB SHADOW E&M-EST. PATIENT-LVL III: ICD-10-PCS | Mod: PBBFAC,,, | Performed by: NURSE PRACTITIONER

## 2019-12-12 PROCEDURE — 90471 IMMUNIZATION ADMIN: CPT | Mod: S$GLB,,, | Performed by: NURSE PRACTITIONER

## 2019-12-12 PROCEDURE — 3078F DIAST BP <80 MM HG: CPT | Mod: CPTII,S$GLB,, | Performed by: NURSE PRACTITIONER

## 2019-12-12 PROCEDURE — 90714 TD VACC NO PRESV 7 YRS+ IM: CPT | Mod: S$GLB,,, | Performed by: NURSE PRACTITIONER

## 2019-12-12 PROCEDURE — 90471 TD VACCINE GREATER THAN OR EQUAL TO 7YO WITH PRESERVATIVE IM: ICD-10-PCS | Mod: S$GLB,,, | Performed by: NURSE PRACTITIONER

## 2019-12-12 PROCEDURE — 99999 PR PBB SHADOW E&M-EST. PATIENT-LVL III: CPT | Mod: PBBFAC,,, | Performed by: NURSE PRACTITIONER

## 2019-12-12 RX ORDER — METHOCARBAMOL 750 MG/1
750 TABLET, FILM COATED ORAL 2 TIMES DAILY PRN
COMMUNITY
Start: 2019-09-24 | End: 2020-01-30

## 2019-12-12 RX ORDER — ESCITALOPRAM OXALATE 20 MG/1
20 TABLET ORAL NIGHTLY
Qty: 90 TABLET | Refills: 2 | Status: SHIPPED | OUTPATIENT
Start: 2019-12-12 | End: 2020-07-12 | Stop reason: SDUPTHER

## 2019-12-12 RX ORDER — METOPROLOL TARTRATE 100 MG/1
100 TABLET ORAL 2 TIMES DAILY
Qty: 180 TABLET | Refills: 2 | Status: SHIPPED | OUTPATIENT
Start: 2019-12-12 | End: 2020-07-12 | Stop reason: SDUPTHER

## 2019-12-12 RX ORDER — POTASSIUM CHLORIDE 20 MEQ/1
20 TABLET, EXTENDED RELEASE ORAL DAILY
Qty: 90 TABLET | Refills: 2 | Status: SHIPPED | OUTPATIENT
Start: 2019-12-12 | End: 2020-01-28 | Stop reason: SDUPTHER

## 2019-12-12 RX ORDER — POTASSIUM CHLORIDE 20 MEQ/1
20 TABLET, EXTENDED RELEASE ORAL DAILY
Qty: 90 TABLET | Refills: 1 | Status: CANCELLED | OUTPATIENT
Start: 2019-12-12

## 2019-12-12 RX ORDER — METOPROLOL TARTRATE 100 MG/1
100 TABLET ORAL 2 TIMES DAILY
Qty: 90 TABLET | Refills: 1 | Status: CANCELLED | OUTPATIENT
Start: 2019-12-12

## 2019-12-12 RX ORDER — ESCITALOPRAM OXALATE 20 MG/1
20 TABLET ORAL NIGHTLY
Qty: 90 TABLET | Refills: 1 | Status: CANCELLED | OUTPATIENT
Start: 2019-12-12

## 2019-12-12 NOTE — TELEPHONE ENCOUNTER
----- Message from eJsús Ang sent at 12/12/2019  9:09 AM CST -----  Contact: [facility]  [phone]  Type:  RX Refill Request    Who Called:  [facility]  [phone]    Refill or New Rx:  Refill    RX Name and Strength: potassium chloride SA (K-DUR,KLOR-CON) 20 MEQ tablet 30 tablet 3 12/24/2013    Sig - Route: Take 1 tablet (20 mEq total) by mouth once daily. - Oral     And    escitalopram (LEXAPRO) 20 MG tablet       Sig - Route: Take 20 mg by mouth every evening.  - Oral     And    metoprolol tartrate (LOPRESSOR) 100 MG tablet 90 tablet 3 1/14/2019    Sig - Route: Take 1 tablet (100 mg total) by mouth 2 (two) times daily. - Oral   Sent to pharmacy as: metoprolol tartrate (LOPRESSOR) 100 MG tablet     How is the patient currently taking it? (ex. 1XDay):  See above    Is this a 30 day or 90 day RX:  90 day    Preferred Pharmacy with phone number:     [facility] - [place], [place] - [address]  [address] [facility] [address]  Phone: [phone] Fax: [phone]    Local or Mail Order:  Mail    Ordering Provider:  Cheryl Bradley    Best Call Back Number:  [facility]  [phone]    Additional Information:  Advised needs new Rx on these medications.

## 2019-12-12 NOTE — PROGRESS NOTES
Subjective:       Patient ID: Alessia Nelson is a 64 y.o. female.    Chief Complaint: Follow-up (2 week hypertension) and Immunizations (tetanus vacc today)    Ms. Alessia Nelson is a 64 year old female who presents to the clinic today for 2 week follow up. She is doing well. She is having surgery next Wednesday on her knee by Dr. Briceño.    She continues to see urology.   In regards to the patient's hypertension, patient denies chest pain/sob, and has been compliant with the medication regimen. hypertension controlled. Goal of <130/80. Meds and labs as per orders. Patient taking benazpril 10 mg daily, and metoprolol. Reports intermittent leg swelling.   In regards to their hypothyroidism, patient denies poor energy, skin and hair changes, as well as weight gain. TSH  8.  Labs reviewed. Patient taking 75 mcg of levothyroxine daily. Complaint without side effects.Patient continues to take allpurinol for gout without any concerns. Denies any recent flare ups. Patient continues to see Dr. Aragon for pain management.In regards to her mood, she continues to take wellbutrin and lexapro as ordered. Denies si/hi. Reports medication is effective. Mood improved.     Health Maintenance:  - Tetanus: 12-12-19    Review of Systems   Constitutional: Positive for fatigue. Negative for activity change, appetite change, chills, fever and unexpected weight change.   HENT: Negative for congestion, ear pain, postnasal drip, sore throat and tinnitus.    Eyes: Negative for pain and visual disturbance.   Respiratory: Negative for apnea, cough, chest tightness, shortness of breath and wheezing.    Cardiovascular: Negative for chest pain, palpitations and leg swelling.   Gastrointestinal: Negative for abdominal distention, abdominal pain, blood in stool, constipation, diarrhea, nausea and vomiting.   Endocrine: Negative for polydipsia and polyuria.   Genitourinary: Negative for difficulty urinating, flank pain, frequency and urgency.  "  Musculoskeletal: Positive for arthralgias and myalgias. Negative for back pain and joint swelling.   Skin: Negative for color change.   Allergic/Immunologic: Negative for environmental allergies and food allergies.   Neurological: Negative for dizziness, tremors, syncope, weakness, light-headedness and headaches.   Hematological: Does not bruise/bleed easily.   Psychiatric/Behavioral: Negative for agitation, decreased concentration, self-injury, sleep disturbance and suicidal ideas. The patient is not nervous/anxious.          Reviewed family, medical, surgical, and social history.    Objective:      /73 (BP Location: Left arm, Patient Position: Sitting, BP Method: Medium (Automatic))   Pulse 64   Temp 97.4 °F (36.3 °C) (Tympanic)   Resp 18   Ht 5' 7" (1.702 m)   Wt 85.5 kg (188 lb 8 oz)   SpO2 97%   BMI 29.52 kg/m²   Physical Exam   Constitutional: She is oriented to person, place, and time. She appears well-developed and well-nourished. She is cooperative. No distress.   HENT:   Head: Normocephalic and atraumatic.   Nose: Nose normal.   Mouth/Throat: Uvula is midline, oropharynx is clear and moist and mucous membranes are normal. No uvula swelling.   Eyes: Pupils are equal, round, and reactive to light. Conjunctivae, EOM and lids are normal.   Neck: Trachea normal and full passive range of motion without pain. No tracheal tenderness present. No neck rigidity. No thyroid mass present.   Cardiovascular: Normal rate, regular rhythm, S1 normal, S2 normal, normal heart sounds, intact distal pulses and normal pulses.   Pulmonary/Chest: Effort normal and breath sounds normal. No respiratory distress. She has no decreased breath sounds. She has no wheezes.   Abdominal: Soft. Normal appearance and bowel sounds are normal. She exhibits no distension and no abdominal bruit. There is no guarding and no CVA tenderness.   Musculoskeletal: She exhibits no edema, tenderness or deformity.   Lymphadenopathy:     She " has no cervical adenopathy.   Neurological: She is alert and oriented to person, place, and time. She has normal strength. She exhibits normal muscle tone.   Skin: Skin is warm, dry and intact. Capillary refill takes less than 2 seconds. No abrasion, no laceration, no lesion and no rash noted. She is not diaphoretic. No cyanosis. Nails show no clubbing.   Psychiatric: She has a normal mood and affect. Her speech is normal and behavior is normal. Judgment and thought content normal. Cognition and memory are normal.       Assessment:       1. Essential hypertension    2. Hypothyroidism, unspecified type    3. Gastroesophageal reflux disease, esophagitis presence not specified    4. Hx of pancreatitis    5. Primary insomnia    6. Hyperlipidemia, unspecified hyperlipidemia type    7. Other depression    8. Chronic pain syndrome        Plan:       Essential hypertension  -     metoprolol tartrate (LOPRESSOR) 100 MG tablet; Take 1 tablet (100 mg total) by mouth 2 (two) times daily.  Dispense: 180 tablet; Refill: 2  -     potassium chloride SA (K-DUR,KLOR-CON) 20 MEQ tablet; Take 1 tablet (20 mEq total) by mouth once daily.  Dispense: 90 tablet; Refill: 2    Hypothyroidism, unspecified type    Gastroesophageal reflux disease, esophagitis presence not specified    Hx of pancreatitis    Primary insomnia    Hyperlipidemia, unspecified hyperlipidemia type    Other depression  -     escitalopram oxalate (LEXAPRO) 20 MG tablet; Take 1 tablet (20 mg total) by mouth every evening.  Dispense: 90 tablet; Refill: 2    Chronic pain syndrome    Other orders  -     (In Office Administered) Td Vaccine          PLAN:  - Discussed with patient the plan of care  - Meds refilled  - tetanus given   - Medications reviewed. Medication side effects discussed. Patient has no questions or concerns at this time. Informed patient to notify me regarding any concerns.   - Informed patient to please notify me with any questions or concerns at  anytime  - repeat thyroid in 4 weeks  - Follow up ordered for 3 months      Risks, benefits, and side effects were discussed with the patient. All questions were answered to the fullest satisfaction of the patient, and pt verbalized understanding and agreement to treatment plan. Pt was to call with any new or worsening symptoms, or present to the ER.

## 2019-12-13 DIAGNOSIS — M17.11 UNILATERAL PRIMARY OSTEOARTHRITIS, RIGHT KNEE: Primary | ICD-10-CM

## 2019-12-17 ENCOUNTER — ANESTHESIA EVENT (OUTPATIENT)
Dept: SURGERY | Facility: HOSPITAL | Age: 64
End: 2019-12-17
Payer: MEDICARE

## 2019-12-18 ENCOUNTER — ANESTHESIA (OUTPATIENT)
Dept: SURGERY | Facility: HOSPITAL | Age: 64
End: 2019-12-18
Payer: MEDICARE

## 2019-12-18 ENCOUNTER — HOSPITAL ENCOUNTER (OUTPATIENT)
Facility: HOSPITAL | Age: 64
Discharge: HOME HEALTH | End: 2019-12-19
Attending: ORTHOPAEDIC SURGERY | Admitting: ORTHOPAEDIC SURGERY
Payer: MEDICARE

## 2019-12-18 DIAGNOSIS — G89.29 CHRONIC RIGHT HIP PAIN: ICD-10-CM

## 2019-12-18 DIAGNOSIS — M25.561 CHRONIC PAIN OF RIGHT KNEE: ICD-10-CM

## 2019-12-18 DIAGNOSIS — M17.11 PRIMARY OSTEOARTHRITIS OF RIGHT KNEE: Primary | ICD-10-CM

## 2019-12-18 DIAGNOSIS — G89.29 CHRONIC PAIN OF RIGHT KNEE: ICD-10-CM

## 2019-12-18 DIAGNOSIS — M70.61 GREATER TROCHANTERIC BURSITIS OF RIGHT HIP: ICD-10-CM

## 2019-12-18 DIAGNOSIS — M17.11 UNILATERAL PRIMARY OSTEOARTHRITIS, RIGHT KNEE: ICD-10-CM

## 2019-12-18 DIAGNOSIS — M25.551 CHRONIC RIGHT HIP PAIN: ICD-10-CM

## 2019-12-18 DIAGNOSIS — G89.4 CHRONIC PAIN DISORDER: ICD-10-CM

## 2019-12-18 DIAGNOSIS — M53.3 SACROILIAC JOINT PAIN: ICD-10-CM

## 2019-12-18 PROBLEM — I25.10 CAD (CORONARY ARTERY DISEASE): Status: ACTIVE | Noted: 2019-12-18

## 2019-12-18 PROBLEM — Z86.73 HISTORY OF TIA (TRANSIENT ISCHEMIC ATTACK): Status: ACTIVE | Noted: 2019-12-18

## 2019-12-18 PROCEDURE — 25000003 PHARM REV CODE 250

## 2019-12-18 PROCEDURE — 25000003 PHARM REV CODE 250: Performed by: ANESTHESIOLOGY

## 2019-12-18 PROCEDURE — 97161 PT EVAL LOW COMPLEX 20 MIN: CPT

## 2019-12-18 PROCEDURE — 27447 PR TOTAL KNEE ARTHROPLASTY: ICD-10-PCS | Mod: RT,,, | Performed by: ORTHOPAEDIC SURGERY

## 2019-12-18 PROCEDURE — 99900035 HC TECH TIME PER 15 MIN (STAT)

## 2019-12-18 PROCEDURE — 36000711: Performed by: ORTHOPAEDIC SURGERY

## 2019-12-18 PROCEDURE — 97116 GAIT TRAINING THERAPY: CPT

## 2019-12-18 PROCEDURE — 71000039 HC RECOVERY, EACH ADD'L HOUR: Performed by: ORTHOPAEDIC SURGERY

## 2019-12-18 PROCEDURE — 76942 PR U/S GUIDANCE FOR NEEDLE GUIDANCE: ICD-10-PCS | Mod: 26,,, | Performed by: ANESTHESIOLOGY

## 2019-12-18 PROCEDURE — 27200750 HC INSULATED NEEDLE/ STIMUPLEX: Performed by: ANESTHESIOLOGY

## 2019-12-18 PROCEDURE — 64447 NJX AA&/STRD FEMORAL NRV IMG: CPT | Performed by: ANESTHESIOLOGY

## 2019-12-18 PROCEDURE — C1776 JOINT DEVICE (IMPLANTABLE): HCPCS | Performed by: ORTHOPAEDIC SURGERY

## 2019-12-18 PROCEDURE — 25000003 PHARM REV CODE 250: Performed by: NURSE PRACTITIONER

## 2019-12-18 PROCEDURE — 27447 TOTAL KNEE ARTHROPLASTY: CPT | Mod: RT,,, | Performed by: ORTHOPAEDIC SURGERY

## 2019-12-18 PROCEDURE — 63600175 PHARM REV CODE 636 W HCPCS

## 2019-12-18 PROCEDURE — 25000003 PHARM REV CODE 250: Performed by: ORTHOPAEDIC SURGERY

## 2019-12-18 PROCEDURE — S4991 NICOTINE PATCH NONLEGEND: HCPCS | Performed by: NURSE PRACTITIONER

## 2019-12-18 PROCEDURE — D9220A PRA ANESTHESIA: Mod: CRNA,,, | Performed by: NURSE ANESTHETIST, CERTIFIED REGISTERED

## 2019-12-18 PROCEDURE — 71000033 HC RECOVERY, INTIAL HOUR: Performed by: ORTHOPAEDIC SURGERY

## 2019-12-18 PROCEDURE — 27201423 OPTIME MED/SURG SUP & DEVICES STERILE SUPPLY: Performed by: ORTHOPAEDIC SURGERY

## 2019-12-18 PROCEDURE — 94799 UNLISTED PULMONARY SVC/PX: CPT

## 2019-12-18 PROCEDURE — 36000710: Performed by: ORTHOPAEDIC SURGERY

## 2019-12-18 PROCEDURE — 63600175 PHARM REV CODE 636 W HCPCS: Performed by: NURSE PRACTITIONER

## 2019-12-18 PROCEDURE — A4216 STERILE WATER/SALINE, 10 ML: HCPCS | Performed by: ANESTHESIOLOGY

## 2019-12-18 PROCEDURE — 64447 PR NERVE BLOCK INJ, ANES/STEROID, FEMORAL, INCL IMAG GUIDANCE: ICD-10-PCS | Mod: 59,RT,, | Performed by: ANESTHESIOLOGY

## 2019-12-18 PROCEDURE — D9220A PRA ANESTHESIA: ICD-10-PCS | Mod: CRNA,,, | Performed by: NURSE ANESTHETIST, CERTIFIED REGISTERED

## 2019-12-18 PROCEDURE — C1713 ANCHOR/SCREW BN/BN,TIS/BN: HCPCS | Performed by: ORTHOPAEDIC SURGERY

## 2019-12-18 PROCEDURE — C9290 INJ, BUPIVACAINE LIPOSOME: HCPCS | Performed by: ANESTHESIOLOGY

## 2019-12-18 PROCEDURE — 99900103 DSU ONLY-NO CHARGE-INITIAL HR (STAT): Performed by: ORTHOPAEDIC SURGERY

## 2019-12-18 PROCEDURE — 94761 N-INVAS EAR/PLS OXIMETRY MLT: CPT

## 2019-12-18 PROCEDURE — 37000008 HC ANESTHESIA 1ST 15 MINUTES: Performed by: ORTHOPAEDIC SURGERY

## 2019-12-18 PROCEDURE — D9220A PRA ANESTHESIA: ICD-10-PCS | Mod: ANES,,, | Performed by: ANESTHESIOLOGY

## 2019-12-18 PROCEDURE — 25000003 PHARM REV CODE 250: Performed by: NURSE ANESTHETIST, CERTIFIED REGISTERED

## 2019-12-18 PROCEDURE — S0020 INJECTION, BUPIVICAINE HYDRO: HCPCS | Performed by: ANESTHESIOLOGY

## 2019-12-18 PROCEDURE — 64447 NJX AA&/STRD FEMORAL NRV IMG: CPT | Mod: 59,RT,, | Performed by: ANESTHESIOLOGY

## 2019-12-18 PROCEDURE — 63600175 PHARM REV CODE 636 W HCPCS: Performed by: ANESTHESIOLOGY

## 2019-12-18 PROCEDURE — 27200651 HC AIRWAY, LMA: Performed by: ANESTHESIOLOGY

## 2019-12-18 PROCEDURE — 37000009 HC ANESTHESIA EA ADD 15 MINS: Performed by: ORTHOPAEDIC SURGERY

## 2019-12-18 PROCEDURE — 76942 ECHO GUIDE FOR BIOPSY: CPT | Mod: 26,,, | Performed by: ANESTHESIOLOGY

## 2019-12-18 PROCEDURE — D9220A PRA ANESTHESIA: Mod: ANES,,, | Performed by: ANESTHESIOLOGY

## 2019-12-18 PROCEDURE — 63600175 PHARM REV CODE 636 W HCPCS: Performed by: NURSE ANESTHETIST, CERTIFIED REGISTERED

## 2019-12-18 PROCEDURE — 63600175 PHARM REV CODE 636 W HCPCS: Performed by: ORTHOPAEDIC SURGERY

## 2019-12-18 PROCEDURE — 99900104 DSU ONLY-NO CHARGE-EA ADD'L HR (STAT): Performed by: ORTHOPAEDIC SURGERY

## 2019-12-18 DEVICE — INSERT TIBIAL CRUC SZ3-4 9MM: Type: IMPLANTABLE DEVICE | Site: KNEE | Status: FUNCTIONAL

## 2019-12-18 DEVICE — COMPONENT PATELLA 32X9MM: Type: IMPLANTABLE DEVICE | Site: KNEE | Status: FUNCTIONAL

## 2019-12-18 DEVICE — PIN NON RIMMED SPEED 65MM: Type: IMPLANTABLE DEVICE | Site: KNEE | Status: FUNCTIONAL

## 2019-12-18 DEVICE — BASEPLATE TIBIAL GENESIS SZ4: Type: IMPLANTABLE DEVICE | Site: KNEE | Status: FUNCTIONAL

## 2019-12-18 RX ORDER — GLYCOPYRROLATE 0.2 MG/ML
INJECTION INTRAMUSCULAR; INTRAVENOUS
Status: DISCONTINUED | OUTPATIENT
Start: 2019-12-18 | End: 2019-12-18

## 2019-12-18 RX ORDER — SODIUM CHLORIDE, SODIUM LACTATE, POTASSIUM CHLORIDE, CALCIUM CHLORIDE 600; 310; 30; 20 MG/100ML; MG/100ML; MG/100ML; MG/100ML
10 INJECTION, SOLUTION INTRAVENOUS CONTINUOUS
Status: DISCONTINUED | OUTPATIENT
Start: 2019-12-18 | End: 2019-12-18

## 2019-12-18 RX ORDER — OXYCODONE HCL 10 MG/1
10 TABLET, FILM COATED, EXTENDED RELEASE ORAL ONCE
Status: DISCONTINUED | OUTPATIENT
Start: 2019-12-18 | End: 2019-12-18

## 2019-12-18 RX ORDER — EPHEDRINE SULFATE 50 MG/ML
INJECTION, SOLUTION INTRAVENOUS
Status: DISCONTINUED | OUTPATIENT
Start: 2019-12-18 | End: 2019-12-18

## 2019-12-18 RX ORDER — ACETAMINOPHEN 10 MG/ML
INJECTION, SOLUTION INTRAVENOUS
Status: COMPLETED
Start: 2019-12-18 | End: 2019-12-18

## 2019-12-18 RX ORDER — MORPHINE SULFATE 2 MG/ML
2 INJECTION, SOLUTION INTRAMUSCULAR; INTRAVENOUS
Status: DISCONTINUED | OUTPATIENT
Start: 2019-12-18 | End: 2019-12-19 | Stop reason: HOSPADM

## 2019-12-18 RX ORDER — KETAMINE HYDROCHLORIDE 100 MG/ML
INJECTION, SOLUTION INTRAMUSCULAR; INTRAVENOUS
Status: DISCONTINUED | OUTPATIENT
Start: 2019-12-18 | End: 2019-12-18

## 2019-12-18 RX ORDER — ACETAMINOPHEN 10 MG/ML
1000 INJECTION, SOLUTION INTRAVENOUS ONCE
Status: COMPLETED | OUTPATIENT
Start: 2019-12-18 | End: 2019-12-18

## 2019-12-18 RX ORDER — HYDROMORPHONE HYDROCHLORIDE 2 MG/ML
0.2 INJECTION, SOLUTION INTRAMUSCULAR; INTRAVENOUS; SUBCUTANEOUS EVERY 5 MIN PRN
Status: COMPLETED | OUTPATIENT
Start: 2019-12-18 | End: 2019-12-18

## 2019-12-18 RX ORDER — POTASSIUM CHLORIDE 20 MEQ/1
20 TABLET, EXTENDED RELEASE ORAL NIGHTLY
Status: DISCONTINUED | OUTPATIENT
Start: 2019-12-18 | End: 2019-12-19 | Stop reason: HOSPADM

## 2019-12-18 RX ORDER — DOCUSATE SODIUM 100 MG/1
100 CAPSULE, LIQUID FILLED ORAL EVERY 12 HOURS
Status: DISCONTINUED | OUTPATIENT
Start: 2019-12-18 | End: 2019-12-19 | Stop reason: HOSPADM

## 2019-12-18 RX ORDER — HYDROMORPHONE HYDROCHLORIDE 1 MG/ML
1 INJECTION, SOLUTION INTRAMUSCULAR; INTRAVENOUS; SUBCUTANEOUS EVERY 4 HOURS PRN
Status: DISCONTINUED | OUTPATIENT
Start: 2019-12-18 | End: 2019-12-19 | Stop reason: HOSPADM

## 2019-12-18 RX ORDER — TRAMADOL HYDROCHLORIDE 50 MG/1
50 TABLET ORAL EVERY 4 HOURS PRN
Status: DISCONTINUED | OUTPATIENT
Start: 2019-12-18 | End: 2019-12-19 | Stop reason: HOSPADM

## 2019-12-18 RX ORDER — MIDAZOLAM HYDROCHLORIDE 1 MG/ML
INJECTION, SOLUTION INTRAMUSCULAR; INTRAVENOUS
Status: DISCONTINUED | OUTPATIENT
Start: 2019-12-18 | End: 2019-12-18

## 2019-12-18 RX ORDER — CLOPIDOGREL BISULFATE 75 MG/1
75 TABLET ORAL DAILY
Status: DISCONTINUED | OUTPATIENT
Start: 2019-12-18 | End: 2019-12-19 | Stop reason: HOSPADM

## 2019-12-18 RX ORDER — OXYCODONE HYDROCHLORIDE 5 MG/1
5 TABLET ORAL
Status: DISCONTINUED | OUTPATIENT
Start: 2019-12-18 | End: 2019-12-19 | Stop reason: HOSPADM

## 2019-12-18 RX ORDER — BUPROPION HYDROCHLORIDE 150 MG/1
150 TABLET ORAL NIGHTLY
Status: DISCONTINUED | OUTPATIENT
Start: 2019-12-18 | End: 2019-12-19 | Stop reason: HOSPADM

## 2019-12-18 RX ORDER — ENOXAPARIN SODIUM 100 MG/ML
40 INJECTION SUBCUTANEOUS EVERY 24 HOURS
Status: DISCONTINUED | OUTPATIENT
Start: 2019-12-18 | End: 2019-12-19 | Stop reason: HOSPADM

## 2019-12-18 RX ORDER — CELECOXIB 100 MG/1
CAPSULE ORAL
Status: COMPLETED
Start: 2019-12-18 | End: 2019-12-18

## 2019-12-18 RX ORDER — CELECOXIB 100 MG/1
200 CAPSULE ORAL ONCE
Status: COMPLETED | OUTPATIENT
Start: 2019-12-18 | End: 2019-12-18

## 2019-12-18 RX ORDER — CHOLESTYRAMINE 4 G/4.8G
1 POWDER, FOR SUSPENSION ORAL 2 TIMES DAILY
Status: DISCONTINUED | OUTPATIENT
Start: 2019-12-18 | End: 2019-12-19 | Stop reason: HOSPADM

## 2019-12-18 RX ORDER — IPRATROPIUM BROMIDE AND ALBUTEROL SULFATE 2.5; .5 MG/3ML; MG/3ML
3 SOLUTION RESPIRATORY (INHALATION) EVERY 4 HOURS PRN
Status: DISCONTINUED | OUTPATIENT
Start: 2019-12-18 | End: 2019-12-19 | Stop reason: HOSPADM

## 2019-12-18 RX ORDER — PROPOFOL 10 MG/ML
VIAL (ML) INTRAVENOUS
Status: DISCONTINUED | OUTPATIENT
Start: 2019-12-18 | End: 2019-12-18

## 2019-12-18 RX ORDER — ESCITALOPRAM OXALATE 10 MG/1
20 TABLET ORAL NIGHTLY
Status: DISCONTINUED | OUTPATIENT
Start: 2019-12-18 | End: 2019-12-19 | Stop reason: HOSPADM

## 2019-12-18 RX ORDER — ACETAMINOPHEN 10 MG/ML
1000 INJECTION, SOLUTION INTRAVENOUS ONCE
Status: DISCONTINUED | OUTPATIENT
Start: 2019-12-18 | End: 2019-12-18

## 2019-12-18 RX ORDER — METOCLOPRAMIDE 10 MG/1
10 TABLET ORAL EVERY 6 HOURS PRN
Status: DISCONTINUED | OUTPATIENT
Start: 2019-12-18 | End: 2019-12-19 | Stop reason: HOSPADM

## 2019-12-18 RX ORDER — OXYCODONE HYDROCHLORIDE 5 MG/1
5 TABLET ORAL
Status: DISCONTINUED | OUTPATIENT
Start: 2019-12-18 | End: 2019-12-18 | Stop reason: HOSPADM

## 2019-12-18 RX ORDER — BACLOFEN 10 MG/1
10 TABLET ORAL 2 TIMES DAILY
Status: DISCONTINUED | OUTPATIENT
Start: 2019-12-18 | End: 2019-12-19 | Stop reason: HOSPADM

## 2019-12-18 RX ORDER — BENAZEPRIL HYDROCHLORIDE 10 MG/1
20 TABLET ORAL 2 TIMES DAILY
Status: DISCONTINUED | OUTPATIENT
Start: 2019-12-18 | End: 2019-12-19 | Stop reason: HOSPADM

## 2019-12-18 RX ORDER — ROSUVASTATIN CALCIUM 20 MG/1
20 TABLET, COATED ORAL DAILY
Status: DISCONTINUED | OUTPATIENT
Start: 2019-12-18 | End: 2019-12-19 | Stop reason: HOSPADM

## 2019-12-18 RX ORDER — CELECOXIB 100 MG/1
200 CAPSULE ORAL ONCE
Status: DISCONTINUED | OUTPATIENT
Start: 2019-12-18 | End: 2019-12-18

## 2019-12-18 RX ORDER — TRANEXAMIC ACID 100 MG/ML
INJECTION, SOLUTION INTRAVENOUS
Status: DISCONTINUED | OUTPATIENT
Start: 2019-12-18 | End: 2019-12-18

## 2019-12-18 RX ORDER — MUPIROCIN 20 MG/G
OINTMENT TOPICAL
Status: DISCONTINUED | OUTPATIENT
Start: 2019-12-18 | End: 2019-12-18 | Stop reason: HOSPADM

## 2019-12-18 RX ORDER — GLYCOPYRROLATE 1 MG/1
1 TABLET ORAL 2 TIMES DAILY
Status: DISCONTINUED | OUTPATIENT
Start: 2019-12-18 | End: 2019-12-19 | Stop reason: HOSPADM

## 2019-12-18 RX ORDER — HYDROCHLOROTHIAZIDE 12.5 MG/1
12.5 TABLET ORAL DAILY
Status: DISCONTINUED | OUTPATIENT
Start: 2019-12-18 | End: 2019-12-19 | Stop reason: HOSPADM

## 2019-12-18 RX ORDER — PANTOPRAZOLE SODIUM 40 MG/1
40 TABLET, DELAYED RELEASE ORAL DAILY
Status: DISCONTINUED | OUTPATIENT
Start: 2019-12-19 | End: 2019-12-19 | Stop reason: HOSPADM

## 2019-12-18 RX ORDER — BUPIVACAINE HYDROCHLORIDE 5 MG/ML
INJECTION, SOLUTION EPIDURAL; INTRACAUDAL
Status: COMPLETED | OUTPATIENT
Start: 2019-12-18 | End: 2019-12-18

## 2019-12-18 RX ORDER — TRAZODONE HYDROCHLORIDE 50 MG/1
100 TABLET ORAL NIGHTLY
Status: DISCONTINUED | OUTPATIENT
Start: 2019-12-18 | End: 2019-12-19 | Stop reason: HOSPADM

## 2019-12-18 RX ORDER — OXYCODONE HYDROCHLORIDE 10 MG/1
10 TABLET ORAL
Status: DISCONTINUED | OUTPATIENT
Start: 2019-12-18 | End: 2019-12-19 | Stop reason: HOSPADM

## 2019-12-18 RX ORDER — ACETAMINOPHEN 500 MG
1000 TABLET ORAL EVERY 8 HOURS
Status: DISCONTINUED | OUTPATIENT
Start: 2019-12-18 | End: 2019-12-18 | Stop reason: SDUPTHER

## 2019-12-18 RX ORDER — PREGABALIN 75 MG/1
75 CAPSULE ORAL 2 TIMES DAILY
Status: DISCONTINUED | OUTPATIENT
Start: 2019-12-18 | End: 2019-12-19 | Stop reason: HOSPADM

## 2019-12-18 RX ORDER — SODIUM CHLORIDE 0.9 % (FLUSH) 0.9 %
10 SYRINGE (ML) INJECTION
Status: DISCONTINUED | OUTPATIENT
Start: 2019-12-18 | End: 2019-12-19 | Stop reason: HOSPADM

## 2019-12-18 RX ORDER — LOPERAMIDE HYDROCHLORIDE 2 MG/1
2 CAPSULE ORAL CONTINUOUS PRN
Status: DISCONTINUED | OUTPATIENT
Start: 2019-12-18 | End: 2019-12-19 | Stop reason: HOSPADM

## 2019-12-18 RX ORDER — METOPROLOL TARTRATE 50 MG/1
100 TABLET ORAL 2 TIMES DAILY
Status: DISCONTINUED | OUTPATIENT
Start: 2019-12-18 | End: 2019-12-19 | Stop reason: HOSPADM

## 2019-12-18 RX ORDER — BISACODYL 10 MG
10 SUPPOSITORY, RECTAL RECTAL DAILY PRN
Status: DISCONTINUED | OUTPATIENT
Start: 2019-12-18 | End: 2019-12-19 | Stop reason: HOSPADM

## 2019-12-18 RX ORDER — ZOLPIDEM TARTRATE 5 MG/1
5 TABLET ORAL NIGHTLY PRN
Status: DISCONTINUED | OUTPATIENT
Start: 2019-12-18 | End: 2019-12-19 | Stop reason: HOSPADM

## 2019-12-18 RX ORDER — MUPIROCIN 20 MG/G
1 OINTMENT TOPICAL 2 TIMES DAILY
Status: DISCONTINUED | OUTPATIENT
Start: 2019-12-18 | End: 2019-12-19 | Stop reason: HOSPADM

## 2019-12-18 RX ORDER — FENTANYL CITRATE 50 UG/ML
25 INJECTION, SOLUTION INTRAMUSCULAR; INTRAVENOUS EVERY 5 MIN PRN
Status: DISCONTINUED | OUTPATIENT
Start: 2019-12-18 | End: 2019-12-18 | Stop reason: HOSPADM

## 2019-12-18 RX ORDER — CEFAZOLIN SODIUM 2 G/50ML
2 SOLUTION INTRAVENOUS
Status: COMPLETED | OUTPATIENT
Start: 2019-12-18 | End: 2019-12-19

## 2019-12-18 RX ORDER — ONDANSETRON 8 MG/1
8 TABLET, ORALLY DISINTEGRATING ORAL EVERY 8 HOURS PRN
Status: DISCONTINUED | OUTPATIENT
Start: 2019-12-18 | End: 2019-12-19 | Stop reason: HOSPADM

## 2019-12-18 RX ORDER — OXYCODONE HCL 10 MG/1
TABLET, FILM COATED, EXTENDED RELEASE ORAL
Status: COMPLETED
Start: 2019-12-18 | End: 2019-12-18

## 2019-12-18 RX ORDER — LIDOCAINE HYDROCHLORIDE 10 MG/ML
1 INJECTION, SOLUTION EPIDURAL; INFILTRATION; INTRACAUDAL; PERINEURAL ONCE
Status: DISCONTINUED | OUTPATIENT
Start: 2019-12-18 | End: 2019-12-18

## 2019-12-18 RX ORDER — ACETAMINOPHEN 325 MG/1
650 TABLET ORAL EVERY 4 HOURS
Status: DISCONTINUED | OUTPATIENT
Start: 2019-12-18 | End: 2019-12-19 | Stop reason: HOSPADM

## 2019-12-18 RX ORDER — KETOROLAC TROMETHAMINE 30 MG/ML
15 INJECTION, SOLUTION INTRAMUSCULAR; INTRAVENOUS EVERY 6 HOURS
Status: DISCONTINUED | OUTPATIENT
Start: 2019-12-18 | End: 2019-12-18

## 2019-12-18 RX ORDER — FENTANYL CITRATE 50 UG/ML
INJECTION, SOLUTION INTRAMUSCULAR; INTRAVENOUS
Status: DISCONTINUED | OUTPATIENT
Start: 2019-12-18 | End: 2019-12-18

## 2019-12-18 RX ORDER — PREGABALIN 75 MG/1
75 CAPSULE ORAL ONCE
Status: DISCONTINUED | OUTPATIENT
Start: 2019-12-18 | End: 2019-12-18 | Stop reason: HOSPADM

## 2019-12-18 RX ORDER — ALLOPURINOL 300 MG/1
300 TABLET ORAL DAILY
Status: DISCONTINUED | OUTPATIENT
Start: 2019-12-18 | End: 2019-12-19 | Stop reason: HOSPADM

## 2019-12-18 RX ORDER — LIDOCAINE HCL/PF 100 MG/5ML
SYRINGE (ML) INTRAVENOUS
Status: DISCONTINUED | OUTPATIENT
Start: 2019-12-18 | End: 2019-12-18

## 2019-12-18 RX ORDER — OXYCODONE HCL 10 MG/1
10 TABLET, FILM COATED, EXTENDED RELEASE ORAL ONCE
Status: COMPLETED | OUTPATIENT
Start: 2019-12-18 | End: 2019-12-18

## 2019-12-18 RX ORDER — NICOTINE 7MG/24HR
1 PATCH, TRANSDERMAL 24 HOURS TRANSDERMAL DAILY
Status: DISCONTINUED | OUTPATIENT
Start: 2019-12-18 | End: 2019-12-19

## 2019-12-18 RX ORDER — NICOTINE 7MG/24HR
1 PATCH, TRANSDERMAL 24 HOURS TRANSDERMAL DAILY
Status: DISCONTINUED | OUTPATIENT
Start: 2019-12-19 | End: 2019-12-18

## 2019-12-18 RX ORDER — LACTULOSE 10 G/15ML
20 SOLUTION ORAL EVERY 6 HOURS PRN
Status: DISCONTINUED | OUTPATIENT
Start: 2019-12-18 | End: 2019-12-19 | Stop reason: HOSPADM

## 2019-12-18 RX ORDER — ONDANSETRON 2 MG/ML
4 INJECTION INTRAMUSCULAR; INTRAVENOUS EVERY 12 HOURS PRN
Status: DISCONTINUED | OUTPATIENT
Start: 2019-12-18 | End: 2019-12-19 | Stop reason: HOSPADM

## 2019-12-18 RX ORDER — ACETAMINOPHEN 325 MG/1
650 TABLET ORAL EVERY 4 HOURS
Status: DISCONTINUED | OUTPATIENT
Start: 2019-12-18 | End: 2019-12-18

## 2019-12-18 RX ORDER — SODIUM CHLORIDE 0.9 % (FLUSH) 0.9 %
3 SYRINGE (ML) INJECTION EVERY 8 HOURS
Status: DISCONTINUED | OUTPATIENT
Start: 2019-12-18 | End: 2019-12-18

## 2019-12-18 RX ORDER — FAMOTIDINE 20 MG/1
20 TABLET, FILM COATED ORAL 2 TIMES DAILY
Status: DISCONTINUED | OUTPATIENT
Start: 2019-12-18 | End: 2019-12-19 | Stop reason: HOSPADM

## 2019-12-18 RX ORDER — CELECOXIB 100 MG/1
100 CAPSULE ORAL 2 TIMES DAILY
Status: DISCONTINUED | OUTPATIENT
Start: 2019-12-19 | End: 2019-12-19 | Stop reason: HOSPADM

## 2019-12-18 RX ORDER — SODIUM CHLORIDE, SODIUM LACTATE, POTASSIUM CHLORIDE, CALCIUM CHLORIDE 600; 310; 30; 20 MG/100ML; MG/100ML; MG/100ML; MG/100ML
1000 INJECTION, SOLUTION INTRAVENOUS ONCE
Status: DISCONTINUED | OUTPATIENT
Start: 2019-12-18 | End: 2019-12-18 | Stop reason: HOSPADM

## 2019-12-18 RX ADMIN — ACETAMINOPHEN 650 MG: 325 TABLET ORAL at 10:12

## 2019-12-18 RX ADMIN — BUPIVACAINE 20 ML: 13.3 INJECTION, SUSPENSION, LIPOSOMAL INFILTRATION at 08:12

## 2019-12-18 RX ADMIN — OXYCODONE HYDROCHLORIDE 10 MG: 10 TABLET ORAL at 03:12

## 2019-12-18 RX ADMIN — FAMOTIDINE 20 MG: 20 TABLET ORAL at 08:12

## 2019-12-18 RX ADMIN — ESCITALOPRAM OXALATE 20 MG: 10 TABLET ORAL at 08:12

## 2019-12-18 RX ADMIN — HYDROMORPHONE HYDROCHLORIDE 0.2 MG: 2 INJECTION, SOLUTION INTRAMUSCULAR; INTRAVENOUS; SUBCUTANEOUS at 10:12

## 2019-12-18 RX ADMIN — CHOLESTYRAMINE 4 G: 4 POWDER, FOR SUSPENSION ORAL at 08:12

## 2019-12-18 RX ADMIN — ACETAMINOPHEN 1000 MG: 10 INJECTION, SOLUTION INTRAVENOUS at 08:12

## 2019-12-18 RX ADMIN — CELECOXIB 200 MG: 100 CAPSULE ORAL at 07:12

## 2019-12-18 RX ADMIN — OXYCODONE HCL 10 MG: 10 TABLET, FILM COATED, EXTENDED RELEASE ORAL at 08:12

## 2019-12-18 RX ADMIN — BENAZEPRIL HYDROCHLORIDE 20 MG: 10 TABLET ORAL at 08:12

## 2019-12-18 RX ADMIN — HYDROMORPHONE HYDROCHLORIDE 0.2 MG: 2 INJECTION, SOLUTION INTRAMUSCULAR; INTRAVENOUS; SUBCUTANEOUS at 11:12

## 2019-12-18 RX ADMIN — METOPROLOL TARTRATE 100 MG: 50 TABLET ORAL at 08:12

## 2019-12-18 RX ADMIN — DEXTROSE 3 G: 50 INJECTION, SOLUTION INTRAVENOUS at 08:12

## 2019-12-18 RX ADMIN — GLYCOPYRROLATE 1 MG: 1 TABLET ORAL at 08:12

## 2019-12-18 RX ADMIN — MUPIROCIN: 20 OINTMENT TOPICAL at 07:12

## 2019-12-18 RX ADMIN — EPHEDRINE SULFATE 15 MG: 50 INJECTION, SOLUTION INTRAMUSCULAR; INTRAVENOUS; SUBCUTANEOUS at 09:12

## 2019-12-18 RX ADMIN — MIDAZOLAM 2 MG: 1 INJECTION INTRAMUSCULAR; INTRAVENOUS at 08:12

## 2019-12-18 RX ADMIN — OXYCODONE HYDROCHLORIDE 10 MG: 10 TABLET ORAL at 12:12

## 2019-12-18 RX ADMIN — GLYCOPYRROLATE 0.4 MG: 0.2 INJECTION, SOLUTION INTRAMUSCULAR; INTRAVENOUS at 08:12

## 2019-12-18 RX ADMIN — OXYCODONE HYDROCHLORIDE 10 MG: 10 TABLET ORAL at 10:12

## 2019-12-18 RX ADMIN — SODIUM CHLORIDE, SODIUM GLUCONATE, SODIUM ACETATE, POTASSIUM CHLORIDE, MAGNESIUM CHLORIDE, SODIUM PHOSPHATE, DIBASIC, AND POTASSIUM PHOSPHATE: .53; .5; .37; .037; .03; .012; .00082 INJECTION, SOLUTION INTRAVENOUS at 07:12

## 2019-12-18 RX ADMIN — CEFAZOLIN SODIUM 2 G: 2 SOLUTION INTRAVENOUS at 03:12

## 2019-12-18 RX ADMIN — BACLOFEN 10 MG: 10 TABLET ORAL at 08:12

## 2019-12-18 RX ADMIN — CELECOXIB 200 MG: 100 CAPSULE ORAL at 11:12

## 2019-12-18 RX ADMIN — OXYCODONE HYDROCHLORIDE 10 MG: 10 TABLET ORAL at 06:12

## 2019-12-18 RX ADMIN — ACETAMINOPHEN 1000 MG: 10 INJECTION, SOLUTION INTRAVENOUS at 03:12

## 2019-12-18 RX ADMIN — FENTANYL CITRATE 50 MCG: 50 INJECTION, SOLUTION INTRAMUSCULAR; INTRAVENOUS at 08:12

## 2019-12-18 RX ADMIN — FENTANYL CITRATE 50 MCG: 50 INJECTION, SOLUTION INTRAMUSCULAR; INTRAVENOUS at 09:12

## 2019-12-18 RX ADMIN — Medication 3 ML: at 07:12

## 2019-12-18 RX ADMIN — KETAMINE HYDROCHLORIDE 50 MG: 100 INJECTION, SOLUTION, CONCENTRATE INTRAMUSCULAR; INTRAVENOUS at 08:12

## 2019-12-18 RX ADMIN — PROPOFOL 140 MG: 10 INJECTION, EMULSION INTRAVENOUS at 08:12

## 2019-12-18 RX ADMIN — POTASSIUM CHLORIDE 20 MEQ: 1500 TABLET, EXTENDED RELEASE ORAL at 08:12

## 2019-12-18 RX ADMIN — OXYCODONE HYDROCHLORIDE 10 MG: 10 TABLET, FILM COATED, EXTENDED RELEASE ORAL at 08:12

## 2019-12-18 RX ADMIN — TRANEXAMIC ACID 1000 MG: 100 INJECTION, SOLUTION INTRAVENOUS at 08:12

## 2019-12-18 RX ADMIN — ONDANSETRON 4 MG: 2 INJECTION INTRAMUSCULAR; INTRAVENOUS at 10:12

## 2019-12-18 RX ADMIN — BUPROPION HYDROCHLORIDE 150 MG: 150 TABLET, FILM COATED, EXTENDED RELEASE ORAL at 08:12

## 2019-12-18 RX ADMIN — MUPIROCIN 1 G: 20 OINTMENT TOPICAL at 08:12

## 2019-12-18 RX ADMIN — TRAZODONE HYDROCHLORIDE 100 MG: 50 TABLET ORAL at 08:12

## 2019-12-18 RX ADMIN — BUPIVACAINE HYDROCHLORIDE 10 ML: 5 INJECTION, SOLUTION EPIDURAL; INTRACAUDAL; PERINEURAL at 08:12

## 2019-12-18 RX ADMIN — LIDOCAINE HYDROCHLORIDE 100 MG: 20 INJECTION, SOLUTION INTRAVENOUS at 08:12

## 2019-12-18 RX ADMIN — NICOTINE 7 MG/24 HR DAILY TRANSDERMAL PATCH 1 PATCH: at 08:12

## 2019-12-18 RX ADMIN — DOCUSATE SODIUM 100 MG: 100 CAPSULE, LIQUID FILLED ORAL at 08:12

## 2019-12-18 RX ADMIN — ENOXAPARIN SODIUM 40 MG: 100 INJECTION SUBCUTANEOUS at 06:12

## 2019-12-18 RX ADMIN — EPHEDRINE SULFATE 20 MG: 50 INJECTION, SOLUTION INTRAMUSCULAR; INTRAVENOUS; SUBCUTANEOUS at 08:12

## 2019-12-18 NOTE — ANESTHESIA POSTPROCEDURE EVALUATION
Anesthesia Post Evaluation    Patient: Alessia Nelson    Procedure(s) Performed: Procedure(s) (LRB):  ARTHROPLASTY, KNEE (Right)    Final Anesthesia Type: general    Patient location during evaluation: PACU  Patient participation: Yes- Able to Participate  Level of consciousness: sedated and awake  Post-procedure vital signs: reviewed and stable  Pain management: adequate  Airway patency: patent    PONV status at discharge: No PONV  Anesthetic complications: no      Cardiovascular status: hypertensive and blood pressure returned to baseline  Respiratory status: spontaneous ventilation  Hydration status: euvolemic  Follow-up not needed.          Vitals Value Taken Time   /76 12/18/2019 10:05 AM   Temp 36.4 °C (97.5 °F) 12/18/2019 10:05 AM   Pulse 64 12/18/2019 10:07 AM   Resp 11 12/18/2019 10:07 AM   SpO2 99 % 12/18/2019 10:07 AM   Vitals shown include unvalidated device data.      No case tracking events are documented in the log.      Pain/Radha Score: Pain Rating Prior to Med Admin: 2 (12/18/2019  8:13 AM)

## 2019-12-18 NOTE — TRANSFER OF CARE
"Anesthesia Transfer of Care Note    Patient: Alessia Nelson    Procedure(s) Performed: Procedure(s) (LRB):  ARTHROPLASTY, KNEE (Right)    Patient location: PACU    Anesthesia Type: general    Transport from OR: Transported from OR on 2-3 L/min O2 by NC with adequate spontaneous ventilation    Post pain: adequate analgesia    Post assessment: no apparent anesthetic complications and tolerated procedure well    Post vital signs: stable    Level of consciousness: awake    Nausea/Vomiting: no nausea/vomiting    Complications: none    Transfer of care protocol was followed      Last vitals:   Visit Vitals  BP (!) 141/63 (BP Location: Left arm, Patient Position: Lying)   Pulse (!) 53   Temp 36.6 °C (97.9 °F) (Oral)   Resp 16   Ht 5' 7" (1.702 m)   Wt 83.9 kg (185 lb)   SpO2 98%   Breastfeeding? No   BMI 28.98 kg/m²     "

## 2019-12-18 NOTE — H&P
[facility] Medicine  History & Physical    Patient Name: Alessia Nelson  MRN: [id number]  Admission Date: 12/18/2019  Attending Physician: Mary Kate Holloway MD   Primary Care Provider: Cheryl Bradley NP        Patient information was obtained from patient and Records available.     Subjective:     Principal Problem:Chronic pain of right knee, status post right total knee arthroplasty    Chief Complaint:  Chronic right knee pain     HPI: This is a 64-year-old female who has a history of CAD, CHF, COPD, depression, diverticulosis, GERD, hypertension, thyroid disease, TIA, and chronic pain knee of the right knee due to degenerative tear of the medial meniscus and osteoarthritis.  On 12/18/2019 patient underwent a right total knee arthroplasty done by Dr. Ike Briceño II.  Patient tolerated procedure well and currently post procedure.      Past Medical History:   Diagnosis Date    Acute pancreatitis     CAD (coronary artery disease)     CHF (congestive heart failure)     COPD (chronic obstructive pulmonary disease)     Depression     Diverticulosis     Encounter for blood transfusion     GERD (gastroesophageal reflux disease)     History of blood clots     1986 AFTER BOWEL RESCETION    History of bowel resection     Hypertension     Peritonitis     Seizures     HAD A SEIZURE FROM PHENERGAN     Thyroid disease     TIA (transient ischemic attack)        Past Surgical History:   Procedure Laterality Date    ADRENAL GLAND SURGERY      APPENDECTOMY      BACK SURGERY      CHOLECYSTECTOMY      COLONOSCOPY      CORONARY STENT PLACEMENT      CYSTOURETHROSCOPY N/A 11/13/2019    Procedure: CYSTOURETHROSCOPY;  Surgeon: Shun Nuñez MD;  Location: [facility];  Service: Urology;  Laterality: N/A;    ENDOSCOPIC ULTRASOUND OF UPPER GASTROINTESTINAL TRACT N/A 11/25/2019    Procedure: ULTRASOUND, UPPER GI TRACT, ENDOSCOPIC;  Surgeon: Alhaji Bridges MD;  Location:  "[facility] (2ND FLR);  Service: Endoscopy;  Laterality: N/A;  5 day hold Plavix, Dr Jovanni Serrano - pg  PM prep    HERNIA REPAIR      HYSTERECTOMY      INCISIONAL HERNIA REPAIR      INJECTION OF ANESTHETIC AGENT AROUND NERVE Right 5/27/2019    Procedure: RIGHT L5-S3 MEDIAL BRANCH BLOCKS;  Surgeon: Sneha Aragon MD;  Location: [facility] OR;  Service: Pain Management;  Laterality: Right;  **DO NOT STOP PLAVIX**    instestine      KNEE SURGERY  1983    PARATHYROID GLAND SURGERY      3 surgeries    RADIOFREQUENCY ABLATION Right 6/10/2019    Procedure: Radiofrequency Ablation - RIGHT L3-5 RADIOFREQUENCY ABLATION WITH HALYARD COOLIEF THERMAL SYSTEM;  Surgeon: Sneha Aragon MD;  Location: [facility] OR;  Service: Pain Management;  Laterality: Right;  **HOLD PLAVIX x 7 DAYS PRIOR**    UPPER GASTROINTESTINAL ENDOSCOPY         Review of patient's allergies indicates:   Allergen Reactions    Aspirin      "Makes stomach feel like it's on fire"    Lortab [hydrocodone-acetaminophen] Itching    Phenergan [promethazine]      SEIZURES    Promethazine hcl        No current facility-administered medications on file prior to encounter.      Current Outpatient Medications on File Prior to Encounter   Medication Sig    allopurinol (ZYLOPRIM) 300 MG tablet Take 300 mg by mouth once daily.     baclofen (LIORESAL) 10 MG tablet Take 1 tablet (10 mg total) by mouth 2 (two) times daily.    buPROPion (WELLBUTRIN XL) 150 MG TB24 tablet Take 1 tablet (150 mg total) by mouth once daily. (Patient taking differently: Take 150 mg by mouth every evening. )    colestipol (COLESTID) 1 gram Tab Take 1 g by mouth daily as needed.     gabapentin (NEURONTIN) 400 MG capsule Take 1 capsule (400 mg total) by mouth 4 (four) times daily. (Patient taking differently: Take 400 mg by mouth 2 (two) times daily. )    glycopyrrolate (ROBINUL) 1 mg Tab TAKE 1 TABLET TWICE DAILY (Patient taking differently: 1 mg 2 (two) times daily. TAKE 1 TABLET TWICE DAILY) "    hydroCHLOROthiazide (HYDRODIURIL) 12.5 MG Tab Take 1 tablet (12.5 mg total) by mouth once daily.    HYDROcodone-acetaminophen (NORCO) 7.5-325 mg per tablet Take 1 tablet by mouth 2 (two) times daily as needed for Pain.    levothyroxine (SYNTHROID) 75 MCG tablet Take 1 tablet (75 mcg total) by mouth once daily.    mirabegron (MYRBETRIQ) 50 mg Tb24 Take 1 tablet (50 mg total) by mouth once daily.    ondansetron (ZOFRAN) 4 MG tablet Take 2 tablets (8 mg total) by mouth every 8 (eight) hours as needed for Nausea.    pantoprazole (PROTONIX) 40 MG tablet Take 1 tablet (40 mg total) by mouth once daily.    ranitidine (ZANTAC) 150 MG tablet Take 150 mg by mouth nightly.     rosuvastatin (CRESTOR) 20 MG tablet Take 1 tablet (20 mg total) by mouth once daily.    traZODone (DESYREL) 100 MG tablet TAKE 1 TABLET (100 MG TOTAL) BY MOUTH EVERY EVENING.    acetaminophen (TYLENOL) 325 MG tablet Take 2 tablets (650 mg total) by mouth every 6 (six) hours as needed (or equal to 101 degree F).    benazepril (LOTENSIN) 20 MG tablet Take 1 tablet (20 mg total) by mouth once daily. (Patient taking differently: Take 20 mg by mouth 2 (two) times daily. )    clopidogrel (PLAVIX) 75 mg tablet Take 1 tablet (75 mg total) by mouth once daily.     Family History     Problem Relation (Age of Onset)    Cancer Maternal Grandfather    Heart disease Father    Stroke Maternal Grandmother, Mother        Tobacco Use    Smoking status: Current Every Day Smoker     Packs/day: 1.00     Years: 46.00     Pack years: 46.00    Smokeless tobacco: Never Used   Substance and Sexual Activity    Alcohol use: Yes     Comment: RARELY    Drug use: No    Sexual activity: Not Currently     Review of Systems   Constitutional: Negative for appetite change, chills, diaphoresis, fatigue and fever.   HENT: Negative for ear discharge, ear pain and facial swelling.    Eyes: Negative for pain and redness.   Respiratory: Negative for cough and shortness of  breath.    Cardiovascular: Negative for chest pain.   Gastrointestinal: Positive for diarrhea. Negative for abdominal distention, abdominal pain, blood in stool, constipation, nausea and vomiting.        Patient reports chronic diarrhea since prior bowel resection   Endocrine: Negative for polydipsia and polyphagia.   Genitourinary: Negative for difficulty urinating, dysuria, flank pain and hematuria.   Musculoskeletal: Positive for arthralgias. Negative for neck pain and neck stiffness.        Chronic right knee pain   Skin: Positive for wound. Negative for color change.        Right knee surgical dressing   Allergic/Immunologic: Negative for food allergies.   Neurological: Negative for seizures, syncope, facial asymmetry and speech difficulty.   Hematological: Does not bruise/bleed easily.   Psychiatric/Behavioral: Negative for agitation, behavioral problems, confusion and hallucinations. The patient is not nervous/anxious.      Objective:     Vital Signs (Most Recent):  Temp: 98 °F (36.7 °C) (12/18/19 1605)  Pulse: 65 (12/18/19 1605)  Resp: 18 (12/18/19 1605)  BP: (!) 209/83 (12/18/19 1605)  SpO2: 95 % (12/18/19 1605) Vital Signs (24h Range):  Temp:  [97.2 °F (36.2 °C)-98 °F (36.7 °C)] 98 °F (36.7 °C)  Pulse:  [53-66] 65  Resp:  [12-20] 18  SpO2:  [94 %-99 %] 95 %  BP: (141-209)/(63-84) 209/83     Weight: 83.9 kg (185 lb)  Body mass index is 28.98 kg/m².    Physical Exam   Constitutional: She is oriented to person, place, and time. She appears well-developed and well-nourished. No distress.   HENT:   Head: Normocephalic and atraumatic.   Eyes: Pupils are equal, round, and reactive to light. Conjunctivae and EOM are normal. Right eye exhibits no discharge. Left eye exhibits no discharge.   Neck: Normal range of motion. Neck supple. No JVD present.   Cardiovascular: Normal rate, regular rhythm, normal heart sounds and intact distal pulses.   No murmur heard.  Pulmonary/Chest: Effort normal and breath sounds normal.  No respiratory distress. She has no wheezes.   Abdominal: Soft. Bowel sounds are normal. She exhibits no distension. There is no tenderness. There is no guarding.  Abdominal hernia noted  Genitourinary:   Genitourinary Comments: Not examined   Musculoskeletal: She exhibits no edema.   Right lower extremity range of motion not examined   Neurological: She is alert and oriented to person, place, and time. No cranial nerve deficit.   Skin: Skin is warm and dry. Capillary refill takes less than 2 seconds. She is not diaphoretic.   Right knee surgical dressing in place   Psychiatric: She has a normal mood and affect. Her behavior is normal. Judgment and thought content normal.         CRANIAL NERVES     CN III, IV, VI   Pupils are equal, round, and reactive to light.  Extraocular motions are normal.        Preop labs reviewed    MRSA surveillance screen negative        Assessment/Plan:     * Chronic pain of right knee  Secondary to degenerative tear of medial meniscus, osteoarthritis of the right knee/ status post right knee total arthroplasty-  Orders per surgeon- Ike Briceño II  Consult physical therapy Occupational therapy  P.r.n. pain medication  Fall precautions      History of TIA (transient ischemic attack)  Continue home medications      CAD (coronary artery disease)  Telemetry  Continue home medications      Chronic diarrhea  Patient reports chronic chronic diarrhea since prior bowel resection      Dyslipidemia  Continue home medications      Hypothyroidism  Continue home medications      GERD (gastroesophageal reflux disease)  Continue PPI      Mild episode of recurrent major depressive disorder  Continue home medications      Nicotine dependence  Smoking cessation education > 3 minutes  Add nicotine patch      COPD (chronic obstructive pulmonary disease)  Monitor O2 sats  Continue home medications      HTN (hypertension), onset in her 20s  Continue home medications        VTE Risk Mitigation (From  admission, onward)         Ordered     enoxaparin injection 40 mg  Daily      12/18/19 1723     Place ELENI hose  Until discontinued      12/18/19 1723     IP VTE HIGH RISK PATIENT  Once      12/18/19 1130     Place ELENI hose  Until discontinued      12/18/19 1130     Place sequential compression device  Until discontinued      12/18/19 1130                 Time spent seeing patient( greater than 1/2 spent in direct contact) : 58 minutes    Carie Hsieh, RAQUEL  Department of Hospital Medicine   [facility]

## 2019-12-18 NOTE — OP NOTE
[facility]  Orthopedic Surgery Department  Operative Note    SUMMARY     Date of Procedure: 12/18/2019     Procedure: Procedure(s) (LRB):  ARTHROPLASTY, KNEE (Right)     Surgeon(s) and Role:     * Ike Briceño II, MD - Primary    Assisting Surgeon: None    First Assist:  KANWAL Richards    Pre-Operative Diagnosis: Chronic pain of right knee [M25.561, G89.29]  Degenerative tear of medial meniscus of right knee [M23.203]    Post-Operative Diagnosis: Post-Op Diagnosis Codes:     * Chronic pain of right knee [M25.561, G89.29]     * Degenerative tear of medial meniscus of right knee [M23.203]    Anesthesia: General    Technical Procedures Used:  Right total knee arthroplasty    Description of the Findings of the Procedure:  Dictated    Significant Surgical Tasks Conducted by the Assistant(s), if Applicable:  Retraction, wound closure, and bandage application    Complications: No    Estimated Blood Loss (EBL): * No values recorded between 12/18/2019  8:51 AM and 12/18/2019 10:00 AM *           Implants:   Implant Name Type Inv. Item Serial No.  Lot No. LRB No. Used   VERSABOND 40 GRAM     [id number] Right 2   PIN NON RIMMED SPEED 65MM - [id number]  PIN NON RIMMED SPEED 65MM  [employer] [id number] Right 1   PLATE LOCK FEMUR [id number] - [id number]  PLATE LOCK FEMUR 4.1E417ZI  [employer] [id number] Right 1   SIZE 4 RIGHT CRUCIATE RETAINING LEGION OXINIUM FEMORAL COMPONENT      [id number] Right 1   BASEPLATE TIBIAL LAWANDA SZ4 - [id number]  BASEPLATE TIBIAL LAWANDA SZ4  [employer] [id number] Right 1   INSERT TIBIAL CRUC SZ3-4 9MM - [id number]  INSERT TIBIAL CRUC SZ3-4 9MM  [employer] [id number] Right 1   COMPONENT PATELLA 32X9MM - [id number]  COMPONENT PATELLA 32X9MM  [employer] [id number] Right 1       Specimens:   Specimen (12h ago, onward)    None                  Condition: Good    Disposition: PACU - hemodynamically stable.    Attestation: I was  present for the entire procedure.    Procedure In Detail:  The patient was brought to the operating room and placed on the table in the supine position.  The patient underwent general endotracheal intubation without complication. A tourniquet was placed on the right lower extremity and was prepped and draped in the normal sterile fashion. An Esmarch was used to exsanguinate the limb and the tourniquet was taken up to 300 mm of mercury.  A standard midline incision was made. Dissection was taken through skin and subcutaneous tissue down to the paratenon which was incised and reflected medially.  A mid vastus parapatellar arthrotomy was then created.  The knee was placed in extension and the patella was everted.  Part of the patellar fat pad was resected.  The soft tissue around the patella was denervated with the Bovie.  Oscillating saw was used to make a patellar cut.  We sized the patella and sized out at a 32.  Three lug holes were drilled for 32 trial and a trial 32 mm patellar component was placed on the patella. Hohmann retractors then placed medially and laterally.  Dissection was taken along the proximal tibia medially to the level of the MCL.  The knee was then flexed and the patella was other subluxed over the lateral femoral condyle.  This led excellent exposure of the knee joint which was noted to be severely arthritic.  The remnant of the ACL was resected. A drill bit was then used open the distal femur and, using an intramedullary guide, a cutting block was pinned to the distal femur.  The distal femoral cut was made and the cutting block was removed. The femur was then sized. It sized out at a 4.  Using posterior referencing the verifying the rotation with epicondylar axis, a size 4 cutting block was pinned to the distal femur and the anterior, posterior, and chamfer cuts were made.  The cutting block was removed. A PCL retractor was then placed in the tibia was ladder translated anteriorly.  The  meniscal remnants were resected. Using an intramedullary guide cutting block was pinned to the proximal tibia and the tibial cut was made. The tibia was sized and also sized at a 4.  A size 4 guide was used to drill in the proximal tibia that a size 4 trial was placed on the tibia. A size 4 trial was placed on the femur. A 9 mm trial polyethylene was placed. With these trial components in place the knee had full extension, flexion to 140° with balance flexion-extension gaps and balance varus valgus stability. We felt this to be a good construct.  The trials were punched to accept the final components in all the trials removed.  While the cement was mixed on the back table and the final components were opened, the knee was irrigated with 3 L of normal saline in a pulsatile lavage. Once the cement had reached appropriate consistency size 4 tibial component was cemented and impacted on the tibia and a size 4 femoral component was cemented and impacted on the femur.  Excess cement was removed with a [place].  The trial poly was placed on the tibial tray and the knee was brought to full extension.  The patellar button was then cemented and clamped into place. The knee was then brought into 35° of flexion and held there while the cement completely cured.  Once the cement had cured trial poly was removed and the final highly cross-linked polyethylene was snapped into place on the tibial tray.  With the final components in place the knee had the same range of motion and stability as it did with the trials.  Felt this to be a good construct.  The knee was then placed in 35° of flexion and closure was begun.  The arthrotomy was closed with combination of Ethibond and strata fix suture. We then used strata fix closed deep to the subdermal layer.  The subcuticular layer was closed with Monocryl. A Dermabond device was placed over the skin along with a Bioclusive sterile intraoperative dressing. A polar Care device was placed for  postoperative pain control.  The patient was then awakened from anesthesia and taken recovery where she was noted to be stable postoperatively.  Needle and lap counts were correct at the end the case.

## 2019-12-18 NOTE — PLAN OF CARE
CM met with pt bedside to complete discharge assessment. Pt verified information on facesheet as correct. Pt reports that her POA is her niece Yolis Taylor [phone]. Pt reports living at listed address alone. Reports she will have help from her neighbors while she recovers. PCP is ANUM Bradley. Pharm is [facility] for maintenance and for new medications [facility] in [place]. [place] [facility] [place] [place] Pt needing RW and hip kit. Transportation home will provided by Daisha (neighbor). DC plan is home with [facility] (hh) (disclosure completed). CM following to assist in any DC needs.          12/18/19 1611   Discharge Assessment   Assessment Type Discharge Planning Assessment   Confirmed/corrected address and phone number on facesheet? Yes   Assessment information obtained from? Patient   Communicated expected length of stay with patient/caregiver yes   Prior to hospitilization cognitive status: Alert/Oriented   Prior to hospitalization functional status: Independent;Assistive Equipment   Current cognitive status: Alert/Oriented   Current Functional Status: Independent;Assistive Equipment   Lives With alone   Able to Return to Prior Arrangements yes   Is patient able to care for self after discharge? Yes   Patient's perception of discharge disposition home health   Readmission Within the Last 30 Days no previous admission in last 30 days   Patient currently being followed by outpatient case management? No   Patient currently receives any other outside agency services? No   Equipment Currently Used at Home bedside commode;shower chair   Do you have any problems affording any of your prescribed medications? No   Is the patient taking medications as prescribed? yes   Does the patient have transportation home? Yes   Transportation Anticipated family or friend will provide   Does the patient receive services at the [facility]? No   Discharge Plan A Home Health   Discharge Plan B Home   DME Needed Upon Discharge  walker,  rolling;hip kit   Patient/Family in Agreement with Plan yes

## 2019-12-18 NOTE — PLAN OF CARE
12/18/19 1204   Patient Assessment/Suction   Level of Consciousness (AVPU) alert   Respiratory Effort Normal;Unlabored   PRE-TX-O2   O2 Device (Oxygen Therapy) room air   SpO2 (!) 94 %   Pulse Oximetry Type Intermittent   $ Pulse Oximetry - Multiple Charge Pulse Oximetry - Multiple   Incentive Spirometer   $ Incentive Spirometer Charges postop instruction;proper technique demonstrated   Incentive Spirometer Predicted Level (mL) 2250   Administration (IS) mouthpiece;proper technique demonstrated   Number of Repetitions (IS) 10   Level Incentive Spirometer (mL) 1000   Patient Tolerance (IS) good   ISU and instruct with IS daily use

## 2019-12-18 NOTE — ANESTHESIA PROCEDURE NOTES
Peripheral Block    Patient location during procedure: pre-op   Block not for primary anesthetic.  Reason for block: at surgeon's request and post-op pain management   Post-op Pain Location: right knee  Start time: 12/18/2019 8:00 AM  Timeout: 12/18/2019 8:00 AM   End time: 12/18/2019 8:04 AM    Staffing  Authorizing Provider: Bebeto Bowman MD  Performing Provider: Bebeto Bowman MD    Preanesthetic Checklist  Completed: patient identified, site marked, surgical consent, pre-op evaluation, timeout performed, IV checked, risks and benefits discussed and monitors and equipment checked  Peripheral Block  Patient position: supine  Prep: ChloraPrep  Patient monitoring: heart rate, cardiac monitor, continuous pulse ox, continuous capnometry and frequent blood pressure checks  Block type: adductor canal  Laterality: right  Injection technique: single shot  Needle  Needle type: Stimuplex   Needle gauge: 21 G  Needle length: 4 in  Needle localization: anatomical landmarks and ultrasound guidance   -ultrasound image captured on disc.  Assessment  Injection assessment: negative aspiration, negative parasthesia and local visualized surrounding nerve  Paresthesia pain: none  Heart rate change: no  Slow fractionated injection: yes  Additional Notes  VSS.  [facility] RN monitoring vitals throughout procedure.  Patient tolerated procedure well.Exparel 20ml, 266mg

## 2019-12-18 NOTE — PLAN OF CARE
Pt transferred to phase II. VSS, pain improved after pain meds given, pt has chronic pain, dressing to R knee cdi, cryo in place to R knee, SCD to R foot and L leg on, tolerated clear liquids. Ok per Dr. Bowman to transfer the pt to the floor. Pt transported via bed. Pt's neighbor friends at the bedside. Call light within reach. Report given to MACEY Ranadll.

## 2019-12-18 NOTE — INTERVAL H&P NOTE
The patient has been examined and the H&P has been reviewed:    I concur with the findings and no changes have occurred since H&P was written.    Anesthesia/Surgery risks, benefits and alternative options discussed and understood by patient/family.          Active Hospital Problems    Diagnosis  POA    Chronic pain of right knee [M25.561, G89.29]  Yes      Resolved Hospital Problems   No resolved problems to display.

## 2019-12-18 NOTE — PT/OT/SLP EVAL
Physical Therapy Evaluation    Patient Name:  Alessia Nelson   MRN:  [id number]    Recommendations:     Discharge Recommendations:  home, home with home health, home health PT   Discharge Equipment Recommendations: walker, rolling, bedside commode   Barriers to discharge: None    Assessment:     Alessia Nelson is a 64 y.o. female admitted with a medical diagnosis of <principal problem not specified>.  She presents with the following impairments/functional limitations:  weakness, impaired endurance, impaired balance, pain, impaired functional mobilty, gait instability, decreased lower extremity function .  Patient agreeable to PT eval this afternoon go include gait training.  Patient was able to transfer out of bed with min assist and sit to stand also with min assist.  Patient then able to ambulate x 150 feet with RW with min assist bu tolerated all treatment with C/O increased pain graded 9/10 in the right knee.    Rehab Prognosis: Good; patient would benefit from acute skilled PT services to address these deficits and reach maximum level of function.    Recent Surgery: Procedure(s) (LRB):  ARTHROPLASTY, KNEE (Right) Day of Surgery    Plan:     During this hospitalization, patient to be seen BID to address the identified rehab impairments via gait training, therapeutic activities, therapeutic exercises and progress toward the following goals:    · Plan of Care Expires:   12/25/19    Subjective     Chief Complaint: pain  Patient/Family Comments/goals: go home  Pain/Comfort:  · Pain Rating 1: 9/10  · Location - Side 1: Right  · Location - Orientation 1: lower  · Location 1: knee  · Pain Addressed 1: Cessation of Activity, Reposition  · Pain Rating Post-Intervention 1: 8/10    Patients cultural, spiritual, Islam conflicts given the current situation:      Living Environment:  Currently lives alone in [address] home.  Prior to admission, patients level of function was independent.  Equipment used at home: none.   DME owned (not currently used): none.  Upon discharge, patient will have assistance from family.    Objective:     Communicated with nurse prior to session.  Patient found up in chair with peripheral IV, cryotherapy, SCD  upon PT entry to room.    General Precautions: Standard, fall   Orthopedic Precautions:RLE weight bearing as tolerated   Braces:       Exams:  · RLE ROM: Deficits: knee flexion 80 degrees, extension -20 degrees  · RLE Strength: Deficits: 2/5 overall  · LLE ROM: WFL  · LLE Strength: Deficits: 4+/5 overall    Functional Mobility:  · Bed Mobility:     · Supine to Sit: minimum assistance  · Transfers:     · Sit to Stand:  minimum assistance with rolling walker  · Gait: x 150 feet with RW with min assist      Therapeutic Activities and Exercises:  Instructed in and performed quad sets and ankle pumps.  Patient instructed to perform quad sets and ankle pumps on own during rest of day and patient in agreement.  Patient was informed of reason for them and value of performing them.    AM-PAC 6 CLICK MOBILITY  Total Score:18     Patient left supine with call button in reach and bed alarm on.    GOALS:   Multidisciplinary Problems     Physical Therapy Goals        Problem: Physical Therapy Goal    Goal Priority Disciplines Outcome Goal Variances Interventions   Physical Therapy Goal     PT, PT/OT Ongoing, Progressing     Description:  Goals to be met by: 19    Patient will increase functional independence with mobility by performin. Supine to sit with Modified [place]  2. Sit to supine with Modified [place]  3. Sit to stand transfer with Modified [place]  4. Bed to chair transfer with Modified [place] using Rolling Walker  5. Gait  x 250 feet with Modified [place] using Rolling Walker.                       History:     Past Medical History:   Diagnosis Date    Acute pancreatitis     CAD (coronary artery disease)     CHF (congestive heart failure)     COPD (chronic  obstructive pulmonary disease)     Depression     Diverticulosis     Encounter for blood transfusion     GERD (gastroesophageal reflux disease)     History of blood clots     1986 AFTER BOWEL RESCETION    History of bowel resection     Hypertension     Peritonitis     Seizures     HAD A SEIZURE FROM PHENERGAN     Thyroid disease     TIA (transient ischemic attack)        Past Surgical History:   Procedure Laterality Date    ADRENAL GLAND SURGERY      APPENDECTOMY      BACK SURGERY      CHOLECYSTECTOMY      COLONOSCOPY      CORONARY STENT PLACEMENT      CYSTOURETHROSCOPY N/A 11/13/2019    Procedure: CYSTOURETHROSCOPY;  Surgeon: Shun Nuñez MD;  Location: [facility];  Service: Urology;  Laterality: N/A;    ENDOSCOPIC ULTRASOUND OF UPPER GASTROINTESTINAL TRACT N/A 11/25/2019    Procedure: ULTRASOUND, UPPER GI TRACT, ENDOSCOPIC;  Surgeon: Alhaji Bridges MD;  [facility]  Service: Endoscopy;  Laterality: N/A;  5 day hold Plavix, Dr Jovanni Serrano - pg  PM prep    HERNIA REPAIR      HYSTERECTOMY      INCISIONAL HERNIA REPAIR      INJECTION OF ANESTHETIC AGENT AROUND NERVE Right 5/27/2019    Procedure: RIGHT L5-S3 MEDIAL BRANCH BLOCKS;  Surgeon: Sneha Aragon MD;  Location: [facility] OR;  Service: Pain Management;  Laterality: Right;  **DO NOT STOP PLAVIX**    instestine      KNEE SURGERY  1983    PARATHYROID GLAND SURGERY      3 surgeries    RADIOFREQUENCY ABLATION Right 6/10/2019    Procedure: Radiofrequency Ablation - RIGHT L3-5 RADIOFREQUENCY ABLATION WITH CorniceYARD COOLIEF THERMAL SYSTEM;  Surgeon: Sneha Aragon MD;  Location: [facility] OR;  Service: Pain Management;  Laterality: Right;  **HOLD PLAVIX x 7 DAYS PRIOR**    UPPER GASTROINTESTINAL ENDOSCOPY         Time Tracking:     PT Received On: 12/18/19  PT Start Time: 1350     PT Stop Time: 1414  PT Total Time (min): 24 min     Billable Minutes: Evaluation 15 gait training 9      Chris Megilligan, PT  12/18/2019

## 2019-12-18 NOTE — SUBJECTIVE & OBJECTIVE
Past Medical History:   Diagnosis Date    Acute pancreatitis     CAD (coronary artery disease)     CHF (congestive heart failure)     COPD (chronic obstructive pulmonary disease)     Depression     Diverticulosis     Encounter for blood transfusion     GERD (gastroesophageal reflux disease)     History of blood clots     1986 AFTER BOWEL RESCETION    History of bowel resection     Hypertension     Peritonitis     Seizures     HAD A SEIZURE FROM PHENERGAN     Thyroid disease     TIA (transient ischemic attack)        Past Surgical History:   Procedure Laterality Date    ADRENAL GLAND SURGERY      APPENDECTOMY      BACK SURGERY      CHOLECYSTECTOMY      COLONOSCOPY      CORONARY STENT PLACEMENT      CYSTOURETHROSCOPY N/A 11/13/2019    Procedure: CYSTOURETHROSCOPY;  Surgeon: Shun Nuñez MD;  Location: [facility] OR;  Service: Urology;  Laterality: N/A;    ENDOSCOPIC ULTRASOUND OF UPPER GASTROINTESTINAL TRACT N/A 11/25/2019    Procedure: ULTRASOUND, UPPER GI TRACT, ENDOSCOPIC;  Surgeon: Alhaji Bridges MD;  [facility]  Service: Endoscopy;  Laterality: N/A;  5 day hold Plavix, Dr Jovanni Serrano - pg  PM prep    HERNIA REPAIR      HYSTERECTOMY      INCISIONAL HERNIA REPAIR      INJECTION OF ANESTHETIC AGENT AROUND NERVE Right 5/27/2019    Procedure: RIGHT L5-S3 MEDIAL BRANCH BLOCKS;  Surgeon: Sneha Aragon MD;  Location: [facility] OR;  Service: Pain Management;  Laterality: Right;  **DO NOT STOP PLAVIX**    instestine      KNEE SURGERY  1983    PARATHYROID GLAND SURGERY      3 surgeries    RADIOFREQUENCY ABLATION Right 6/10/2019    Procedure: Radiofrequency Ablation - RIGHT L3-5 RADIOFREQUENCY ABLATION WITH HALYARD COOLIEF THERMAL SYSTEM;  Surgeon: Sneha Aragon MD;  Location[facility];  Service: Pain Management;  Laterality: Right;  **HOLD PLAVIX x 7 DAYS PRIOR**    UPPER GASTROINTESTINAL ENDOSCOPY         Review of patient's allergies indicates:   Allergen Reactions  "   Aspirin      "Makes stomach feel like it's on fire"    Lortab [hydrocodone-acetaminophen] Itching    Phenergan [promethazine]      SEIZURES    Promethazine hcl        No current facility-administered medications on file prior to encounter.      Current Outpatient Medications on File Prior to Encounter   Medication Sig    allopurinol (ZYLOPRIM) 300 MG tablet Take 300 mg by mouth once daily.     baclofen (LIORESAL) 10 MG tablet Take 1 tablet (10 mg total) by mouth 2 (two) times daily.    buPROPion (WELLBUTRIN XL) 150 MG TB24 tablet Take 1 tablet (150 mg total) by mouth once daily. (Patient taking differently: Take 150 mg by mouth every evening. )    colestipol (COLESTID) 1 gram Tab Take 1 g by mouth daily as needed.     gabapentin (NEURONTIN) 400 MG capsule Take 1 capsule (400 mg total) by mouth 4 (four) times daily. (Patient taking differently: Take 400 mg by mouth 2 (two) times daily. )    glycopyrrolate (ROBINUL) 1 mg Tab TAKE 1 TABLET TWICE DAILY (Patient taking differently: 1 mg 2 (two) times daily. TAKE 1 TABLET TWICE DAILY)    hydroCHLOROthiazide (HYDRODIURIL) 12.5 MG Tab Take 1 tablet (12.5 mg total) by mouth once daily.    HYDROcodone-acetaminophen (NORCO) 7.5-325 mg per tablet Take 1 tablet by mouth 2 (two) times daily as needed for Pain.    levothyroxine (SYNTHROID) 75 MCG tablet Take 1 tablet (75 mcg total) by mouth once daily.    mirabegron (MYRBETRIQ) 50 mg Tb24 Take 1 tablet (50 mg total) by mouth once daily.    ondansetron (ZOFRAN) 4 MG tablet Take 2 tablets (8 mg total) by mouth every 8 (eight) hours as needed for Nausea.    pantoprazole (PROTONIX) 40 MG tablet Take 1 tablet (40 mg total) by mouth once daily.    ranitidine (ZANTAC) 150 MG tablet Take 150 mg by mouth nightly.     rosuvastatin (CRESTOR) 20 MG tablet Take 1 tablet (20 mg total) by mouth once daily.    traZODone (DESYREL) 100 MG tablet TAKE 1 TABLET (100 MG TOTAL) BY MOUTH EVERY EVENING.    acetaminophen (TYLENOL) " 325 MG tablet Take 2 tablets (650 mg total) by mouth every 6 (six) hours as needed (or equal to 101 degree F).    benazepril (LOTENSIN) 20 MG tablet Take 1 tablet (20 mg total) by mouth once daily. (Patient taking differently: Take 20 mg by mouth 2 (two) times daily. )    clopidogrel (PLAVIX) 75 mg tablet Take 1 tablet (75 mg total) by mouth once daily.     Family History     Problem Relation (Age of Onset)    Cancer Maternal Grandfather    Heart disease Father    Stroke Maternal Grandmother, Mother        Tobacco Use    Smoking status: Current Every Day Smoker     Packs/day: 1.00     Years: 46.00     Pack years: 46.00    Smokeless tobacco: Never Used   Substance and Sexual Activity    Alcohol use: Yes     Comment: RARELY    Drug use: No    Sexual activity: Not Currently     Review of Systems   Constitutional: Negative for appetite change, chills, diaphoresis, fatigue and fever.   HENT: Negative for ear discharge, ear pain and facial swelling.    Eyes: Negative for pain and redness.   Respiratory: Negative for cough and shortness of breath.    Cardiovascular: Negative for chest pain.   Gastrointestinal: Positive for diarrhea. Negative for abdominal distention, abdominal pain, blood in stool, constipation, nausea and vomiting.        Patient reports chronic diarrhea since prior bowel resection   Endocrine: Negative for polydipsia and polyphagia.   Genitourinary: Negative for difficulty urinating, dysuria, flank pain and hematuria.   Musculoskeletal: Positive for arthralgias. Negative for neck pain and neck stiffness.        Chronic right knee pain   Skin: Positive for wound. Negative for color change.        Right knee surgical dressing   Allergic/Immunologic: Negative for food allergies.   Neurological: Negative for seizures, syncope, facial asymmetry and speech difficulty.   Hematological: Does not bruise/bleed easily.   Psychiatric/Behavioral: Negative for agitation, behavioral problems, confusion and  hallucinations. The patient is not nervous/anxious.      Objective:     Vital Signs (Most Recent):  Temp: 98 °F (36.7 °C) (12/18/19 1605)  Pulse: 65 (12/18/19 1605)  Resp: 18 (12/18/19 1605)  BP: (!) 209/83 (12/18/19 1605)  SpO2: 95 % (12/18/19 1605) Vital Signs (24h Range):  Temp:  [97.2 °F (36.2 °C)-98 °F (36.7 °C)] 98 °F (36.7 °C)  Pulse:  [53-66] 65  Resp:  [12-20] 18  SpO2:  [94 %-99 %] 95 %  BP: (141-209)/(63-84) 209/83     Weight: 83.9 kg (185 lb)  Body mass index is 28.98 kg/m².    Physical Exam   Constitutional: She is oriented to person, place, and time. She appears well-developed and well-nourished. No distress.   HENT:   Head: Normocephalic and atraumatic.   Eyes: Pupils are equal, round, and reactive to light. Conjunctivae and EOM are normal. Right eye exhibits no discharge. Left eye exhibits no discharge.   Neck: Normal range of motion. Neck supple. No JVD present.   Cardiovascular: Normal rate, regular rhythm, normal heart sounds and intact distal pulses.   No murmur heard.  Pulmonary/Chest: Effort normal and breath sounds normal. No respiratory distress. She has no wheezes.   Abdominal: Soft. Bowel sounds are normal. She exhibits no distension. There is no tenderness. There is no guarding.   Genitourinary:   Genitourinary Comments: Not examined   Musculoskeletal: She exhibits no edema.   Right lower extremity range of motion not examined   Neurological: She is alert and oriented to person, place, and time. No cranial nerve deficit.   Skin: Skin is warm and dry. Capillary refill takes less than 2 seconds. She is not diaphoretic.   Right knee surgical dressing in place   Psychiatric: She has a normal mood and affect. Her behavior is normal. Judgment and thought content normal.         CRANIAL NERVES     CN III, IV, VI   Pupils are equal, round, and reactive to light.  Extraocular motions are normal.        Preop labs reviewed    MRSA surveillance screen negative

## 2019-12-18 NOTE — ASSESSMENT & PLAN NOTE
Secondary to degenerative tear of medial meniscus, osteoarthritis of the right knee/ status post right knee total arthroplasty-  Orders per surgeon- Ike Briceño II  Consult physical therapy Occupational therapy  P.r.n. pain medication  Fall precautions

## 2019-12-18 NOTE — ANESTHESIA PREPROCEDURE EVALUATION
12/18/2019  Alessia Nelson is a 64 y.o., female.    Anesthesia Evaluation    I have reviewed the Patient Summary Reports.    I have reviewed the Nursing Notes.   I have reviewed the Medications.     Review of Systems  Cardiovascular:   Hypertension CAD   CHF Off Plavix, stent 2 years ago. Stable   Pulmonary:   COPD    Renal/:   Chronic Renal Disease, CRI    Hepatic/GI:   GERD    Musculoskeletal:   Arthritis     Neurological:   TIA, No residual   Endocrine:   Hypothyroidism    Psych:   anxiety          Physical Exam  General:  Well nourished    Airway/Jaw/Neck:  Airway Findings: Mouth Opening: Normal Tongue: Normal  General Airway Assessment: Adult  Mallampati: III  Improves to II with phonation.      Dental:  Dental Findings: Edentulous   Chest/Lungs:  Chest/Lungs Findings: Clear to auscultation, Normal Respiratory Rate         Mental Status:  Mental Status Findings:  Cooperative, Alert and Oriented         Anesthesia Plan  Type of Anesthesia, risks & benefits discussed:  Anesthesia Type:  general  Patient's Preference:   Intra-op Monitoring Plan: standard ASA monitors  Intra-op Monitoring Plan Comments:   Post Op Pain Control Plan: multimodal analgesia, peripheral nerve block and IV/PO Opioids PRN  Post Op Pain Control Plan Comments:   Induction:   Inhalation and IV  Beta Blocker:  Patient is on a Beta-Blocker and has received one dose within the past 24 hours (No further documentation required).       Informed Consent:  Anesthesia consent signed with patient.  ASA Score: 3     Day of Surgery Review of History & Physical:            Ready For Surgery From Anesthesia Perspective.

## 2019-12-18 NOTE — HPI
This is a 64-year-old female who has a history of CAD, CHF, COPD, depression, diverticulosis, GERD, hypertension, thyroid disease, TIA, and chronic pain knee of the right knee due to degenerative tear of the medial meniscus and osteoarthritis.  On 12/18/2019 patient underwent a right total knee arthroplasty done by Dr. Ike Briceño II.  Patient tolerated procedure well and currently post procedure.

## 2019-12-18 NOTE — ANESTHESIA PROCEDURE NOTES
Intubation  Performed by: Jose Zelaya CRNA  Authorized by: Bebeto Bowman MD     Intubation:     Induction:  Intravenous    Intubated:  Postinduction    Mask Ventilation:  Easy mask    Attempts:  1    Attempted By:  CRNA    Difficult Airway Encountered?: No      Complications:  None    Airway Device:  Supraglottic airway/LMA    Airway Device Size:  3.0    Secured at:  The lips    Placement Verified By:  Capnometry    Complicating Factors:  None

## 2019-12-18 NOTE — PLAN OF CARE
Problem: Physical Therapy Goal  Goal: Physical Therapy Goal  Description  Goals to be met by: 19    Patient will increase functional independence with mobility by performin. Supine to sit with Modified Ponce  2. Sit to supine with Modified Ponce  3. Sit to stand transfer with Modified Ponce  4. Bed to chair transfer with Modified Ponce using Rolling Walker  5. Gait  x 250 feet with Modified Ponce using Rolling Walker.      Outcome: Ongoing, Progressing

## 2019-12-19 VITALS
RESPIRATION RATE: 18 BRPM | SYSTOLIC BLOOD PRESSURE: 92 MMHG | DIASTOLIC BLOOD PRESSURE: 50 MMHG | BODY MASS INDEX: 29.03 KG/M2 | OXYGEN SATURATION: 94 % | TEMPERATURE: 100 F | HEART RATE: 65 BPM | WEIGHT: 185 LBS | HEIGHT: 67 IN

## 2019-12-19 DIAGNOSIS — M17.11 UNILATERAL PRIMARY OSTEOARTHRITIS, RIGHT KNEE: Primary | ICD-10-CM

## 2019-12-19 LAB
ERYTHROCYTE [DISTWIDTH] IN BLOOD BY AUTOMATED COUNT: 14.3 % (ref 11.5–14.5)
HCT VFR BLD AUTO: 30.3 % (ref 37–48.5)
HGB BLD-MCNC: 9.7 G/DL (ref 12–16)
MCH RBC QN AUTO: 30.7 PG (ref 27–31)
MCHC RBC AUTO-ENTMCNC: 32 G/DL (ref 32–36)
MCV RBC AUTO: 96 FL (ref 82–98)
PLATELET # BLD AUTO: 169 K/UL (ref 150–350)
PMV BLD AUTO: 9.2 FL (ref 9.2–12.9)
RBC # BLD AUTO: 3.16 M/UL (ref 4–5.4)
WBC # BLD AUTO: 9.85 K/UL (ref 3.9–12.7)

## 2019-12-19 PROCEDURE — S4991 NICOTINE PATCH NONLEGEND: HCPCS | Performed by: NURSE PRACTITIONER

## 2019-12-19 PROCEDURE — 36415 COLL VENOUS BLD VENIPUNCTURE: CPT

## 2019-12-19 PROCEDURE — 97530 THERAPEUTIC ACTIVITIES: CPT

## 2019-12-19 PROCEDURE — 97116 GAIT TRAINING THERAPY: CPT | Mod: 59

## 2019-12-19 PROCEDURE — 94799 UNLISTED PULMONARY SVC/PX: CPT

## 2019-12-19 PROCEDURE — 97165 OT EVAL LOW COMPLEX 30 MIN: CPT

## 2019-12-19 PROCEDURE — 63600175 PHARM REV CODE 636 W HCPCS: Performed by: ORTHOPAEDIC SURGERY

## 2019-12-19 PROCEDURE — 94761 N-INVAS EAR/PLS OXIMETRY MLT: CPT

## 2019-12-19 PROCEDURE — 25000003 PHARM REV CODE 250: Performed by: NURSE PRACTITIONER

## 2019-12-19 PROCEDURE — 99900035 HC TECH TIME PER 15 MIN (STAT)

## 2019-12-19 PROCEDURE — 85027 COMPLETE CBC AUTOMATED: CPT

## 2019-12-19 PROCEDURE — 97535 SELF CARE MNGMENT TRAINING: CPT | Mod: 59

## 2019-12-19 PROCEDURE — 25000003 PHARM REV CODE 250: Performed by: ANESTHESIOLOGY

## 2019-12-19 PROCEDURE — 25000003 PHARM REV CODE 250: Performed by: ORTHOPAEDIC SURGERY

## 2019-12-19 RX ORDER — ACETAMINOPHEN 325 MG/1
650 TABLET ORAL EVERY 6 HOURS PRN
Refills: 0
Start: 2019-12-19

## 2019-12-19 RX ORDER — DOCUSATE SODIUM 100 MG/1
100 CAPSULE, LIQUID FILLED ORAL EVERY 12 HOURS
Qty: 60 CAPSULE | Refills: 0
Start: 2019-12-19 | End: 2020-01-18

## 2019-12-19 RX ORDER — IBUPROFEN 200 MG
1 TABLET ORAL DAILY
Status: DISCONTINUED | OUTPATIENT
Start: 2019-12-19 | End: 2019-12-19 | Stop reason: HOSPADM

## 2019-12-19 RX ORDER — IBUPROFEN 200 MG
1 TABLET ORAL DAILY
Qty: 30 PATCH | Refills: 0 | Status: SHIPPED | OUTPATIENT
Start: 2019-12-20 | End: 2020-01-19

## 2019-12-19 RX ORDER — HYDROCODONE BITARTRATE AND ACETAMINOPHEN 7.5; 325 MG/1; MG/1
1 TABLET ORAL EVERY 6 HOURS PRN
Qty: 28 TABLET | Refills: 0 | Status: SHIPPED | OUTPATIENT
Start: 2019-12-19 | End: 2020-01-27 | Stop reason: DRUGHIGH

## 2019-12-19 RX ORDER — HYDROCODONE BITARTRATE AND ACETAMINOPHEN 7.5; 325 MG/1; MG/1
1 TABLET ORAL EVERY 6 HOURS PRN
Qty: 28 TABLET | Refills: 0 | Status: SHIPPED | OUTPATIENT
Start: 2019-12-19 | End: 2019-12-26

## 2019-12-19 RX ADMIN — ONDANSETRON 8 MG: 8 TABLET, ORALLY DISINTEGRATING ORAL at 09:12

## 2019-12-19 RX ADMIN — OXYCODONE HYDROCHLORIDE 10 MG: 10 TABLET ORAL at 04:12

## 2019-12-19 RX ADMIN — NICOTINE 1 PATCH: 21 PATCH TRANSDERMAL at 09:12

## 2019-12-19 RX ADMIN — HYDROCHLOROTHIAZIDE 12.5 MG: 12.5 TABLET ORAL at 09:12

## 2019-12-19 RX ADMIN — OXYCODONE HYDROCHLORIDE 5 MG: 5 TABLET ORAL at 09:12

## 2019-12-19 RX ADMIN — ACETAMINOPHEN 650 MG: 325 TABLET ORAL at 09:12

## 2019-12-19 RX ADMIN — OXYCODONE HYDROCHLORIDE 15 MG: 10 TABLET ORAL at 01:12

## 2019-12-19 RX ADMIN — ONDANSETRON 8 MG: 8 TABLET, ORALLY DISINTEGRATING ORAL at 03:12

## 2019-12-19 RX ADMIN — ALLOPURINOL 300 MG: 300 TABLET ORAL at 09:12

## 2019-12-19 RX ADMIN — CEFAZOLIN SODIUM 2 G: 2 SOLUTION INTRAVENOUS at 12:12

## 2019-12-19 RX ADMIN — ACETAMINOPHEN 325 MG: 325 TABLET ORAL at 03:12

## 2019-12-19 RX ADMIN — CLOPIDOGREL BISULFATE 75 MG: 75 TABLET ORAL at 09:12

## 2019-12-19 RX ADMIN — OXYCODONE HYDROCHLORIDE 10 MG: 10 TABLET ORAL at 09:12

## 2019-12-19 RX ADMIN — METOPROLOL TARTRATE 100 MG: 50 TABLET ORAL at 09:12

## 2019-12-19 RX ADMIN — CHOLESTYRAMINE 4 G: 4 POWDER, FOR SUSPENSION ORAL at 09:12

## 2019-12-19 RX ADMIN — PANTOPRAZOLE SODIUM 40 MG: 40 TABLET, DELAYED RELEASE ORAL at 09:12

## 2019-12-19 RX ADMIN — LEVOTHYROXINE SODIUM 75 MCG: 25 TABLET ORAL at 05:12

## 2019-12-19 RX ADMIN — ACETAMINOPHEN 650 MG: 325 TABLET ORAL at 01:12

## 2019-12-19 RX ADMIN — OXYCODONE HYDROCHLORIDE 10 MG: 10 TABLET ORAL at 05:12

## 2019-12-19 RX ADMIN — ACETAMINOPHEN 650 MG: 325 TABLET ORAL at 05:12

## 2019-12-19 RX ADMIN — MUPIROCIN 1 G: 20 OINTMENT TOPICAL at 09:12

## 2019-12-19 RX ADMIN — BENAZEPRIL HYDROCHLORIDE 20 MG: 10 TABLET ORAL at 09:12

## 2019-12-19 RX ADMIN — CELECOXIB 100 MG: 100 CAPSULE ORAL at 09:12

## 2019-12-19 RX ADMIN — FAMOTIDINE 20 MG: 20 TABLET ORAL at 09:12

## 2019-12-19 RX ADMIN — GLYCOPYRROLATE 1 MG: 1 TABLET ORAL at 09:12

## 2019-12-19 RX ADMIN — MORPHINE SULFATE 2 MG: 2 INJECTION, SOLUTION INTRAMUSCULAR; INTRAVENOUS at 10:12

## 2019-12-19 RX ADMIN — BACLOFEN 10 MG: 10 TABLET ORAL at 09:12

## 2019-12-19 RX ADMIN — MORPHINE SULFATE 2 MG: 2 INJECTION, SOLUTION INTRAMUSCULAR; INTRAVENOUS at 04:12

## 2019-12-19 RX ADMIN — OXYCODONE HYDROCHLORIDE 10 MG: 10 TABLET ORAL at 03:12

## 2019-12-19 RX ADMIN — ROSUVASTATIN CALCIUM 20 MG: 20 TABLET, COATED ORAL at 09:12

## 2019-12-19 NOTE — PLAN OF CARE
I manually faxed the pts HH packet to [facility] at [phone]. Unable to use [facility] due to pts outpatient recovery status. Joie Portillo LCSW     12/19/19 1157   Post-Acute Status   Post-Acute Authorization Home Health/Hospice   Home Health/Hospice Status Referrals Sent   Patient choice form signed by patient/caregiver List with quality metrics by geographic area provided

## 2019-12-19 NOTE — PLAN OF CARE
12/19/19 0925   Tobacco Cessation Intervention   Do you use any type of tobacco product? Yes   Are you interested in quitting use of tobacco products? Ready to quit   Pt is a [place] resident and shows interest in quitting smoking.  Pt was educated on smoking cessation.  Information about the [place] smoking cessation program left with Pt.

## 2019-12-19 NOTE — PT/OT/SLP PROGRESS
Occupational Therapy      Patient Name:  Alessia Nelson   MRN:  [id number]    Patient not seen today secondary to 8/10 pain and refused to work with OT this morning. Nurse, Isaiah notified. Will follow-up this afternoon.    ARMIDA Bradley  12/19/2019

## 2019-12-19 NOTE — PLAN OF CARE
Pt pain is controlled with oxy 10mg po ,SLIV clean dry and intact, cryo in use. Dressing to right knee CDI, ambulate to RR, VSS and noted , absent from injury, nad noted will cont to monitor

## 2019-12-19 NOTE — DISCHARGE INSTRUCTIONS
Thank you for choosing [facility] for your medical care. The primary doctor who is taking care of you at the time of your discharge is Mary Kate Holloway MD.     You were admitted to the hospital with Chronic pain of right knee.     Please note your discharge instructions, including diet/activity restrictions, follow-up appointments, and medication changes.  If you have any questions about your medical issues, prescriptions, or any other questions, please feel free to contact the [facility] Medicine Dept at [phone] and we will help.    If you are previously with Home health, outpatient PT/OT or under a therapy program, you are cleared to return to those programs.    Please direct all long term medication refills and follow up to your primary care provider, Cheryl Bradley NP. Thank you again for letting us take care of your health care needs.    Please note the following discharge instructions per your discharging physician-  Carie Hsieh NP

## 2019-12-19 NOTE — DISCHARGE SUMMARY
[facility] Hospital Medicine  Discharge Summary    Patient Name: Alessia Nelson  MRN: [id number]  Admission Date: 12/18/2019  Hospital Length of Stay: 0 days  Discharge Date and Time:  12/19/2019 4:33 PM  Attending Physician: Mary Kate Holloway MD   Discharging Provider: RAQUEL Monk  Primary Care Provider: Cheryl Bradley NP    HPI:   This is a 64-year-old female who has a history of CAD, CHF, COPD, depression, diverticulosis, GERD, hypertension, thyroid disease, TIA, and chronic pain knee of the right knee due to degenerative tear of the medial meniscus and osteoarthritis.  On 12/18/2019 patient underwent a right total knee arthroplasty done by Dr. Ike Briceño II.  Patient tolerated procedure well and currently post procedure.    Procedure(s) (LRB):  ARTHROPLASTY, KNEE (Right)      Hospital Course:   The patient was monitored closely during her stay.  She received physical therapy and occupational therapy.  The patient was educated on the importance of smoking cessation.  Her overall condition remained stable and she was discharged home once cleared by Orthopedics.  The patient's home Plavix was resumed and per Orthopedics it was not felt she would need aspirin postoperatively for DVT prophylaxis.  Home health was ordered for physical therapy and occupational therapy.     Consults:  Orthopedics- Dr. Ike Briceño    Service: Hospital Medicine    Final Active Diagnoses:    Diagnosis Date Noted POA    PRINCIPAL PROBLEM:  Chronic pain of right knee [M25.561, G89.29] 12/05/2019 Yes    CAD (coronary artery disease) [I25.10] 12/18/2019 Yes    History of TIA (transient ischemic attack) [Z86.73] 12/18/2019 Not Applicable    Degenerative tear of medial meniscus of right knee [M23.203] 09/05/2019 Yes    Primary osteoarthritis of right knee [M17.11] 09/05/2019 Yes    Chronic diarrhea [K52.9] 06/06/2019 Yes    Dyslipidemia [E78.5] 06/06/2019 Yes    Nicotine dependence  [F17.200] 12/23/2013 Yes    COPD (chronic obstructive pulmonary disease) [J44.9] 12/23/2013 Yes    HTN (hypertension), onset in her 20s [I10] 12/18/2013 Yes    Mild episode of recurrent major depressive disorder [F33.0] 04/25/2012 Yes    GERD (gastroesophageal reflux disease) [K21.9] 04/25/2012 Yes    Hypothyroidism [E03.9] 04/25/2012 Yes      Problems Resolved During this Admission:       Discharged Condition: stable    Disposition: Home-Health Care [facility]    Follow Up:  Follow-up Information     Ike Briceño II, MD On 1/2/2020.    Specialty:  Orthopedic Surgery  Why:  8:15 for post op  Contact information:  [address] [place] [address] DR Veronica ECKERT [address]  [phone]             [facility].    Why:  Home Health  Contact information:  [address] [facility] [address]  [facility] [place] [facility] [address]  [phone]               Patient Instructions:      Referral to Home health   Referral Priority: Routine Referral Type: Home Health   Referral Reason: Specialty Services Required   Requested Specialty: Home Health Services   Number of Visits Requested: 1     Notify your health care provider if you experience any of the following:  redness, tenderness, or signs of infection (pain, swelling, redness, odor or green/yellow discharge around incision site)     Notify your health care provider if you experience any of the following:  severe uncontrolled pain     Notify your health care provider if you experience any of the following:  persistent nausea and vomiting or diarrhea     Notify your health care provider if you experience any of the following:  temperature >100.4     Notify your health care provider if you experience any of the following:  difficulty breathing or increased cough     Notify your health care provider if you experience any of the following:   Order Comments: Any decline in condition     Change dressing (specify)   Order Comments: Wound care as per Surgeon     Activity as tolerated    Order Comments: No heavy lifting, no strenuous lifting  No driving till cleared by Surgeon       Significant Diagnostic Studies:   CBC   Recent Labs   Lab 12/19/19  0454   WBC 9.85   HGB 9.7*   HCT 30.3*        XR KNEE 1 OR 2 VIEW RIGHT-    CLINICAL HISTORY:  Status post right total knee arthroplasty;    TECHNIQUE:  AP and lateral views of the right knee were performed.    COMPARISON:  Knee of August 13, 2019.    FINDINGS:  The patient has undergone a right knee joint replacement with metallic femoral and tibial components in good position.  For acute fracture is not identified.      Impression       Status post right knee joint replacement.      Electronically signed by: Lorenzo Noel MD  Date: 12/18/2019  Time: 16:59              Pending Diagnostic Studies:     None         Medications:  Reconciled Home Medications:      Medication List      START taking these medications    docusate sodium 100 MG capsule  Commonly known as:  COLACE  Take 1 capsule (100 mg total) by mouth every 12 (twelve) hours.     multivitamin capsule  Take 1 capsule by mouth once daily.     nicotine 21 mg/24 hr  Commonly known as:  NICODERM CQ  Place 1 patch onto the skin once daily.  Start taking on:  December 20, 2019        CHANGE how you take these medications    acetaminophen 325 MG tablet  Commonly known as:  TYLENOL  Take 2 tablets (650 mg total) by mouth every 6 (six) hours as needed for Pain (Do not take with any other tylenol containing products).  What changed:  reasons to take this     benazepril 20 MG tablet  Commonly known as:  LOTENSIN  Take 1 tablet (20 mg total) by mouth once daily.  What changed:  when to take this     buPROPion 150 MG TB24 tablet  Commonly known as:  WELLBUTRIN XL  Take 1 tablet (150 mg total) by mouth once daily.  What changed:  when to take this     gabapentin 400 MG capsule  Commonly known as:  NEURONTIN  Take 1 capsule (400 mg total) by mouth 4 (four) times daily.  What changed:  when to take  this     glycopyrrolate 1 mg Tab  Commonly known as:  ROBINUL  TAKE 1 TABLET TWICE DAILY  What changed:    · how much to take  · when to take this     potassium chloride SA 20 MEQ tablet  Commonly known as:  K-DUR,KLOR-CON  Take 1 tablet (20 mEq total) by mouth once daily.  What changed:  when to take this        CONTINUE taking these medications    allopurinol 300 MG tablet  Commonly known as:  ZYLOPRIM  Take 300 mg by mouth once daily.     clopidogrel 75 mg tablet  Commonly known as:  PLAVIX  Take 1 tablet (75 mg total) by mouth once daily.     colestipol 1 gram Tab  Commonly known as:  COLESTID  Take 1 g by mouth daily as needed.     escitalopram oxalate 20 MG tablet  Commonly known as:  LEXAPRO  Take 1 tablet (20 mg total) by mouth every evening.     hydroCHLOROthiazide 12.5 MG Tab  Commonly known as:  HYDRODIURIL  Take 1 tablet (12.5 mg total) by mouth once daily.     levothyroxine 75 MCG tablet  Commonly known as:  SYNTHROID  Take 1 tablet (75 mcg total) by mouth once daily.     methocarbamol 750 MG Tab  Commonly known as:  ROBAXIN  Take 750 mg by mouth 2 (two) times daily as needed.     metoprolol tartrate 100 MG tablet  Commonly known as:  LOPRESSOR  Take 1 tablet (100 mg total) by mouth 2 (two) times daily.     mirabegron 50 mg Tb24  Commonly known as:  MYRBETRIQ  Take 1 tablet (50 mg total) by mouth once daily.     ondansetron 4 MG tablet  Commonly known as:  ZOFRAN  Take 2 tablets (8 mg total) by mouth every 8 (eight) hours as needed for Nausea.     pantoprazole 40 MG tablet  Commonly known as:  PROTONIX  Take 1 tablet (40 mg total) by mouth once daily.     ranitidine 150 MG tablet  Commonly known as:  ZANTAC  Take 150 mg by mouth nightly.     rosuvastatin 20 MG tablet  Commonly known as:  CRESTOR  Take 1 tablet (20 mg total) by mouth once daily.     traZODone 100 MG tablet  Commonly known as:  DESYREL  TAKE 1 TABLET (100 MG TOTAL) BY MOUTH EVERY EVENING.     VITAMIN B-12 INJ  Inject as directed every 14  (fourteen) days.        STOP taking these medications    baclofen 10 MG tablet  Commonly known as:  LIORESAL        ASK your doctor about these medications    * HYDROcodone-acetaminophen 7.5-325 mg per tablet  Commonly known as:  NORCO  Take 1 tablet by mouth every 6 (six) hours as needed.  Ask about: Which instructions should I use?     * HYDROcodone-acetaminophen 7.5-325 mg per tablet  Commonly known as:  NORCO  Take 1 tablet by mouth every 6 (six) hours as needed for Pain.  Ask about: Which instructions should I use?         * This list has 2 medication(s) that are the same as other medications prescribed for you. Read the directions carefully, and ask your doctor or other care provider to review them with you.                Indwelling Lines/Drains at time of discharge:   Lines/Drains/Airways     None                 Time spent on the discharge of patient: 57 minutes  Patient was seen and examined on the date of discharge and determined to be suitable for discharge.       Carie Hsieh, RAQUEL  Department of Hospital Medicine  [facility]

## 2019-12-19 NOTE — PT/OT/SLP EVAL
Occupational Therapy   Evaluation and Discharge Note    Name: Alessia Nelson  MRN: [id number]  Admitting Diagnosis:  Chronic pain of right knee 1 Day Post-Op    Recommendations:     Discharge Recommendations: home health PT, home with home health  Discharge Equipment Recommendations:  hip kit, walker, rolling  Barriers to discharge:  Decreased caregiver support    Assessment:     Alessia Nelson is a 64 y.o. female with a medical diagnosis of Chronic pain of right knee. At this time, patient is functioning at their prior level of function and does not require further acute OT services.     Plan:     During this hospitalization, patient does not require further acute OT services.  Please re-consult if situation changes.    · Plan of Care Reviewed with: patient    Subjective     Chief Complaint: R leg pain  Patient/Family Comments/goals: To go home today    Occupational Profile:  Living Environment: Pt lives alone in a [facility] with 1 MARY and a walk-in shower.  Previous level of function: Patient was Independent with all I/ADL's at home and in the community and driving.  Equipment Used at home:  3-in-1 commode, shower chair  Assistance upon Discharge: Neighbors and friends will help pt.    Pain/Comfort:  · Pain Rating 1: 7/10  · Location - Side 1: Right  · Location - Orientation 1: upper  · Location 1: leg  · Pain Addressed 1: Pre-medicate for activity, Reposition, Nurse notified, Cessation of Activity, Distraction  · Pain Rating Post-Intervention 1: 6/10    Patients cultural, spiritual, Jainism conflicts given the current situation:      Objective:     Communicated with: nurse Isaiah prior to session.  Patient found HOB elevated with cryotherapy, telemetry upon OT entry to room.    General Precautions: Standard, fall   Orthopedic Precautions:RLE weight bearing as tolerated   Braces: N/A     Occupational Performance:    Bed Mobility:    · Patient completed Scooting/Bridging with stand by assistance  · Patient  completed Supine to Sit with stand by assistance  · Patient completed Sit to Supine with stand by assistance    Functional Mobility/Transfers:  · Patient completed Sit <> Stand Transfer with contact guard assistance  with  rolling walker   · Functional Mobility: 4 side steps to her R side with CGA and walker    Activities of Daily Living:  · Upper Body Dressing: supervision at EOB  · Lower Body Dressing: contact guard assistance and cues for technique to don/doff socks, pants and shoes at EOB using adaptive equipment and to pull pants over hips with walker at bedside    Cognitive/Visual Perceptual:  Cognitive/Psychosocial Skills:     -       Mood/Affect/Coping skills/emotional control: Cooperative    Physical Exam:  Postural examination/scapula alignment:    -       Rounded shoulders  -       Forward head  Edema:  Mild R distal leg and foot  Upper Extremity Range of Motion:     -       Right Upper Extremity: WFL  -       Left Upper Extremity: WFL  Upper Extremity Strength:    -       Right Upper Extremity: WFL  -       Left Upper Extremity: WFL   Strength:    -       Right Upper Extremity: WFL  -       Left Upper Extremity: WFL  Fine Motor Coordination:    -       Intact  Gross motor coordination:   WFL    AMPAC 6 Click ADL:  AMPAC Total Score: 19    Treatment & Education:  OT ed pt on OT role & discharge recommendations and placement of BSC at home.  OT ed pt on use of adaptive equipment for LB dressing, bathing & safe item retrieval with reacher with return demonstration provided.  OT ed patient on safety with walker use for functional mobility with cues for hand placement & sequencing.   OT ed pt on fall risk and strongly advised pt to call for help for all OOB mobility. All tasks required increased time due to patient being talkative and answering calls during treatment session.  Education:    Patient left HOB elevated with all lines intact, call button in reach and Isaiah, nurse notified    GOALS:    Multidisciplinary Problems     Occupational Therapy Goals     Not on file                History:     Past Medical History:   Diagnosis Date    Acute pancreatitis     CAD (coronary artery disease)     CHF (congestive heart failure)     COPD (chronic obstructive pulmonary disease)     Depression     Diverticulosis     Encounter for blood transfusion     GERD (gastroesophageal reflux disease)     History of blood clots     1986 AFTER BOWEL RESCETION    History of bowel resection     Hypertension     Peritonitis     Seizures     HAD A SEIZURE FROM PHENERGAN     Thyroid disease     TIA (transient ischemic attack)        Past Surgical History:   Procedure Laterality Date    ADRENAL GLAND SURGERY      APPENDECTOMY      BACK SURGERY      CHOLECYSTECTOMY      COLONOSCOPY      CORONARY STENT PLACEMENT      CYSTOURETHROSCOPY N/A 11/13/2019    Procedure: CYSTOURETHROSCOPY;  Surgeon: Shun Nuñez MD;  Location: [facility] OR;  Service: Urology;  Laterality: N/A;    ENDOSCOPIC ULTRASOUND OF UPPER GASTROINTESTINAL TRACT N/A 11/25/2019    Procedure: ULTRASOUND, UPPER GI TRACT, ENDOSCOPIC;  Surgeon: Alhaji Bridges MD;  Location: [address]);  Service: Endoscopy;  Laterality: N/A;  5 day hold Plavix, Dr Jovanni Serrano - pg  PM prep    HERNIA REPAIR      HYSTERECTOMY      INCISIONAL HERNIA REPAIR      INJECTION OF ANESTHETIC AGENT AROUND NERVE Right 5/27/2019    Procedure: RIGHT L5-S3 MEDIAL BRANCH BLOCKS;  Surgeon: Sneha Aragon MD;  Location: [facility] OR;  Service: Pain Management;  Laterality: Right;  **DO NOT STOP PLAVIX**    instestine      KNEE ARTHROPLASTY Right 12/18/2019    Procedure: ARTHROPLASTY, KNEE;  Surgeon: Ike Briceño II, MD;  Location: [facility];  Service: Orthopedics;  Laterality: Right;    KNEE SURGERY  1983    PARATHYROID GLAND SURGERY      3 surgeries    RADIOFREQUENCY ABLATION Right 6/10/2019    Procedure: Radiofrequency Ablation - RIGHT L3-5 RADIOFREQUENCY ABLATION  WITH HALYARD COOLIEF THERMAL SYSTEM;  Surgeon: Sneha Aragon MD;  Location: [facility];  Service: Pain Management;  Laterality: Right;  **HOLD PLAVIX x 7 DAYS PRIOR**    UPPER GASTROINTESTINAL ENDOSCOPY         Time Tracking:     OT Date of Treatment: 12/19/19  OT Start Time: 1421  OT Stop Time: 1459  OT Total Time (min): 38 min    Billable Minutes:Evaluation 10  Self Care/Home Management 28    ARMIDA Bradley  12/19/2019

## 2019-12-19 NOTE — PT/OT/SLP PROGRESS
Physical Therapy Treatment    Patient Name:  Alessia Nelson   MRN:  [id number]    Recommendations:     Discharge Recommendations:  home, home with home health, home health PT   Discharge Equipment Recommendations: walker, rolling, bedside commode   Barriers to discharge: None    Assessment:     Alessia Nelson is a 64 y.o. female admitted with a medical diagnosis of Chronic pain of right knee.  She presents with the following impairments/functional limitations:  weakness, impaired endurance, impaired self care skills, impaired functional mobilty, impaired balance, gait instability, decreased lower extremity function, pain, orthopedic precautions, decreased ROM. Patient had pain meds 30 min prior to visit and was requesting more before she would agree to ambulate or get OOB. RN notified. Pt performed supine exercises and cryotherapy was refilled and attached to right knee. RN notified that she gave more pain meds per MD but pt still refusing to ambulate until pain free. She will only perform minimal LLE exercise and continues to refuse OOB to chair. Continue with PT and POC.    Rehab Prognosis: Fair and Poor; patient would benefit from acute skilled PT services to address these deficits and reach maximum level of function.    Recent Surgery: Procedure(s) (LRB):  ARTHROPLASTY, KNEE (Right) 1 Day Post-Op    Plan:     During this hospitalization, patient to be seen BID to address the identified rehab impairments via gait training, therapeutic activities, therapeutic exercises and progress toward the following goals:    · Plan of Care Expires:       Subjective     Chief Complaint: pain  Patient/Family Comments/goals: to not have any pain and have stronger and increased frequency of pain meds  Pain/Comfort:  · Pain Rating 1: 10/10  · Location - Side 1: Left  · Location - Orientation 1: lower  · Location 1: knee  · Pain Addressed 1: Distraction, Reposition, Cessation of Activity, Nurse notified  · Pain Rating  Post-Intervention 1: 10/10      Objective:     Communicated with LORRI Colon prior to session.  Patient found HOB elevated with peripheral IV, SCD, cryotherapy upon PT entry to room.     General Precautions: Standard, fall   Orthopedic Precautions:RLE weight bearing as tolerated   Braces: N/A       Therapeutic Activities and Exercises:   Repositioned pt to HOB and filled cryotherapy and placed on pt's right knee. Pt performed supine exercises: QS, HS, AP x 10.    Patient left HOB elevated with all lines intact, call button in reach and LORRI Colon notified..    GOALS:   Multidisciplinary Problems     Physical Therapy Goals        Problem: Physical Therapy Goal    Goal Priority Disciplines Outcome Goal Variances Interventions   Physical Therapy Goal     PT, PT/OT Ongoing, Progressing     Description:  Goals to be met by: 19    Patient will increase functional independence with mobility by performin. Supine to sit with Modified [place]  2. Sit to supine with Modified [place]  3. Sit to stand transfer with Modified [place]  4. Bed to chair transfer with Modified [place] using Rolling Walker  5. Gait  x 250 feet with Modified [place] using Rolling Walker.                       Time Tracking:     PT Received On: 19  PT Start Time: 926     PT Stop Time: 936  PT Total Time (min): 10 min     Billable Minutes: Therapeutic Activity 10    Treatment Type: Treatment  PT/PTA: PTA     PTA Visit Number: 1     Melia Mora, PTA  2019

## 2019-12-19 NOTE — PLAN OF CARE
12/18/19 1906   PRE-TX-O2   O2 Device (Oxygen Therapy) room air   SpO2 95 %   Pulse Oximetry Type Intermittent   Pulse 66   Resp 18   Aerosol Therapy   $ Aerosol Therapy Charges PRN treatment not required   Respiratory Treatment Status (SVN) PRN treatment not required   Incentive Spirometer   $ Incentive Spirometer Charges done with encouragement   Incentive Spirometer Predicted Level (mL) 2250   Administration (IS) instruction provided, follow-up   Number of Repetitions (IS) 10   Level Incentive Spirometer (mL) 1000   Patient Tolerance (IS) good

## 2019-12-19 NOTE — NURSING
Discharge instructions provided, patient verbalized understanding. PIV removed intact, bleeding controlled, tolerated well. Tele box removed and returned. Personal belongings packed per patient and family. Transported off floor via wheelchair to personal vehicle home.

## 2019-12-19 NOTE — CARE UPDATE
12/19/19 0719   Patient Assessment/Suction   Level of Consciousness (AVPU) alert   Respiratory Effort Normal;Unlabored   PRE-TX-O2   O2 Device (Oxygen Therapy) room air   SpO2 (!) 92 %   Pulse Oximetry Type Intermittent   $ Pulse Oximetry - Multiple Charge Pulse Oximetry - Multiple   Pulse 78   Resp 18   Aerosol Therapy   $ Aerosol Therapy Charges PRN treatment not required   Respiratory Treatment Status (SVN) PRN treatment not required   Incentive Spirometer   $ Incentive Spirometer Charges done with encouragement   Administration (IS) instruction provided, follow-up   Number of Repetitions (IS) 10   Level Incentive Spirometer (mL) 1000   Patient Tolerance (IS) good

## 2019-12-19 NOTE — PROGRESS NOTES
Vital signs stable, afebrile  Resting in bed comfortably, no complaints.  Pain is well controlled    Right lower extremity:  Polar Care is in place and working  Bandage is clean, dry, intact  Motor is intact EHL, FHL, TA, gastroc  Plus two dorsalis pedis and posterior tibial pulses  Normal sensation light touch dorsal, plantar, 1st webspace    Postop day 1.  Status post right total knee arthroplasty, stable and doing well.  Patient has ambulated multiple times down the hallway.  Orthopedic issues are stable  Okay to OR home this morning from an orthopedic standpoint  Pain medications were sent patient's pharmacy  Patient is on Plavix and will not need aspirin postoperatively

## 2019-12-19 NOTE — PLAN OF CARE
Patient participated in therapeutic exercise to improve functional mobility, improve ADL's and promote safe ambulation to decrease fall risk.

## 2019-12-19 NOTE — PLAN OF CARE
Post op appt scheduled with ortho. MARY updated       12/19/19 5307   Discharge Assessment   Assessment Type Discharge Planning Reassessment

## 2019-12-19 NOTE — PLAN OF CARE
Per Julianna with [facility] [phone]- the pt has been accepted. Pts AVS updated. Joie Portillo LCSW     12/19/19 1400   Post-Acute Status   Post-Acute Authorization Home Health/Hospice   Home Health/Hospice Status Set-up Complete

## 2019-12-19 NOTE — HOSPITAL COURSE
The patient was monitored closely during her stay.  She received physical therapy and occupational therapy.  The patient was educated on the importance of smoking cessation.  Her overall condition remained stable and she was discharged home once cleared by Orthopedics.  The patient's home Plavix was resumed and per Orthopedics it was not felt she would need aspirin postoperatively for DVT prophylaxis.  Home health was ordered for physical therapy and occupational therapy.   no

## 2019-12-19 NOTE — PLAN OF CARE
Plan of care reviewed with pt. Ambulating. Pain controlled. Dsg cdi. Cryo in use. Voiding without difficulty. Denies n/v, tolerating meals. Scd in use. Lovenox to be administered. Bed locked and low. Call light in reach. Hourly rounding to ensure safety.

## 2019-12-19 NOTE — PLAN OF CARE
CM delivered pt's RW and hip kit to bedside. CM [facility] education sheet and [facility]. Paperwork completed and returned to DME closet.        12/19/19 3032   Post-Acute Status   Post-Acute Authorization E   E Status Set-up Complete

## 2019-12-19 NOTE — PT/OT/SLP PROGRESS
Physical Therapy Treatment    Patient Name:  Alessia Nelson   MRN:  [id number]    Recommendations:     Discharge Recommendations:  home, home with home health, home health PT   Discharge Equipment Recommendations: walker, rolling, bedside commode   Barriers to discharge: None    Assessment:     Alessia Nelson is a 64 y.o. female admitted with a medical diagnosis of Chronic pain of right knee.  She presents with the following impairments/functional limitations:  weakness, pain, impaired endurance, impaired functional mobilty, impaired self care skills, impaired balance, decreased lower extremity function, edema, orthopedic precautions, decreased ROM, impaired joint extensibility. Patient less uncomfortable this afternoon and agreeable to gait training. Pt ambulated 250' with RW and CGA. No LOB or SOB. She has decreased kevin and antalgic gt. Home with HHPT. Continue with PT and POC.    Rehab Prognosis: Good; patient would benefit from acute skilled PT services to address these deficits and reach maximum level of function.    Recent Surgery: Procedure(s) (LRB):  ARTHROPLASTY, KNEE (Right) 1 Day Post-Op    Plan:     During this hospitalization, patient to be seen BID to address the identified rehab impairments via gait training, therapeutic activities, therapeutic exercises and progress toward the following goals:    · Plan of Care Expires:       Subjective     Chief Complaint: pain is not managed  Patient/Family Comments/goals: to go home  Pain/Comfort:  · Pain Rating 1: 0/10  · Location - Side 1: Left  · Location - Orientation 1: lower  · Location 1: knee  · Pain Addressed 1: Pre-medicate for activity  · Pain Rating Post-Intervention 1: 0/10      Objective:     Communicated with LORRI Colon prior to session.  Patient found HOB elevated with telemetry, cryotherapy upon PT entry to room.     General Precautions: Standard, fall   Orthopedic Precautions:RLE weight bearing as tolerated   Braces: N/A     Functional  Mobility:  · Bed Mobility:     · Supine to Sit: minimum assistance  · Sit to Supine: minimum assistance  · Transfers:     · Sit to Stand:  contact guard assistance with rolling walker  · Gait: 180' with RW and CGA - antalgic gt, decreased kevin      AM-PAC 6 CLICK MOBILITY          Therapeutic Activities and Exercises:   Patient sat on the EOB to increase spinal orientation and proprioception. Gait training, transfer training, education on cryotherapy and attaching it to R knee. Education on HEP and safety.    Patient left HOB elevated with all lines intact, call button in reach and family present..    GOALS:   Multidisciplinary Problems     Physical Therapy Goals        Problem: Physical Therapy Goal    Goal Priority Disciplines Outcome Goal Variances Interventions   Physical Therapy Goal     PT, PT/OT Ongoing, Progressing     Description:  Goals to be met by: 19    Patient will increase functional independence with mobility by performin. Supine to sit with Modified [place]  2. Sit to supine with Modified [place]  3. Sit to stand transfer with Modified [place]  4. Bed to chair transfer with Modified [place] using Rolling Walker  5. Gait  x 250 feet with Modified [place] using Rolling Walker.                       Time Tracking:     PT Received On: 19  PT Start Time: 1356     PT Stop Time: 1422  PT Total Time (min): 26 min     Billable Minutes: Gait Training 15 and Therapeutic Activity 11    Treatment Type: Treatment  PT/PTA: PTA     PTA Visit Number: 1     Melia Mora, PTA  2019

## 2019-12-19 NOTE — PLAN OF CARE
12/19/19 1437   Final Note   Assessment Type Final Discharge Note   Anticipated Discharge Disposition Home-Health   Hospital Follow Up  Appt(s) scheduled? Yes

## 2019-12-20 ENCOUNTER — TELEPHONE (OUTPATIENT)
Dept: MEDSURG UNIT | Facility: HOSPITAL | Age: 64
End: 2019-12-20

## 2019-12-23 DIAGNOSIS — Z96.651 STATUS POST RIGHT KNEE REPLACEMENT: Primary | ICD-10-CM

## 2019-12-30 ENCOUNTER — TELEPHONE (OUTPATIENT)
Dept: ORTHOPEDICS | Facility: CLINIC | Age: 64
End: 2019-12-30

## 2019-12-30 NOTE — TELEPHONE ENCOUNTER
Home Health contacted. Stated the dressing was soiled externally. Old dressing removed, new dressing applied. Dalila Duggan LPN

## 2019-12-30 NOTE — TELEPHONE ENCOUNTER
----- Message from Maurilio Lyle sent at 12/30/2019  3:41 PM CST -----  Contact: Zen with HH  Bandage issues, please call, [phone]

## 2020-01-02 ENCOUNTER — OFFICE VISIT (OUTPATIENT)
Dept: ORTHOPEDICS | Facility: CLINIC | Age: 65
End: 2020-01-02
Payer: MEDICARE

## 2020-01-02 ENCOUNTER — HOSPITAL ENCOUNTER (OUTPATIENT)
Dept: RADIOLOGY | Facility: HOSPITAL | Age: 65
Discharge: HOME | End: 2020-01-02
Attending: ORTHOPAEDIC SURGERY
Payer: MEDICARE

## 2020-01-02 VITALS
HEART RATE: 66 BPM | WEIGHT: 185 LBS | BODY MASS INDEX: 29.03 KG/M2 | OXYGEN SATURATION: 99 % | DIASTOLIC BLOOD PRESSURE: 59 MMHG | HEIGHT: 67 IN | SYSTOLIC BLOOD PRESSURE: 130 MMHG

## 2020-01-02 DIAGNOSIS — Z96.651 STATUS POST RIGHT KNEE REPLACEMENT: Primary | ICD-10-CM

## 2020-01-02 DIAGNOSIS — Z96.651 STATUS POST RIGHT KNEE REPLACEMENT: ICD-10-CM

## 2020-01-02 PROCEDURE — 99024 POSTOP FOLLOW-UP VISIT: CPT | Mod: S$GLB,,, | Performed by: ORTHOPAEDIC SURGERY

## 2020-01-02 PROCEDURE — 99999 PR PBB SHADOW E&M-EST. PATIENT-LVL III: ICD-10-PCS | Mod: PBBFAC,,, | Performed by: ORTHOPAEDIC SURGERY

## 2020-01-02 PROCEDURE — 73560 X-RAY EXAM OF KNEE 1 OR 2: CPT | Mod: TC,PN,RT

## 2020-01-02 PROCEDURE — 73560 XR KNEE 1 OR 2 VIEW RIGHT: ICD-10-PCS | Mod: 26,RT,, | Performed by: RADIOLOGY

## 2020-01-02 PROCEDURE — 73560 X-RAY EXAM OF KNEE 1 OR 2: CPT | Mod: 26,RT,, | Performed by: RADIOLOGY

## 2020-01-02 PROCEDURE — 99999 PR PBB SHADOW E&M-EST. PATIENT-LVL III: CPT | Mod: PBBFAC,,, | Performed by: ORTHOPAEDIC SURGERY

## 2020-01-02 PROCEDURE — 99024 PR POST-OP FOLLOW-UP VISIT: ICD-10-PCS | Mod: S$GLB,,, | Performed by: ORTHOPAEDIC SURGERY

## 2020-01-02 NOTE — PROGRESS NOTES
CC:  64-year-old female follows up status post right total knee arthroplasty. Date of surgery was 12/18/2019.  This is her 2 week follow-up.  Overall she is doing well. She is participating in home health physical therapy.  Outside of physical therapy she does not really report any pain in the right knee.    Examination of the Right Lower Extremity:     Patient's bandage has some spotting on it.  The incision is clean, dry, intact with no active drainage. No surrounding erythema and no sign of infection.  Motor function is intact distally EHL/FHL/TA/luz marina   +2 dorsalis pedis and posterior tibial pulses   Sensation to light touch intact distally dorsal, plantar, and first web space     Examination of the Right knee:    ROM 0 - 150   Effusion positive  Tenderness to palpation at the joint line positive  Pain during range of motion negative  Crepitation during range of motion negative     positive increased pain noted with flexion past 90   positive antalgic gait noted   negative Varus/Valgus instability    X-rays were examined and personally reviewed by me.  There is a right total knee arthroplasty that is well fixed and in good alignment    Dx:  Status post right total knee arthroplasty, stable    Plan:  Progress physical therapy as tolerated.  I instructed the patient to change her bandage daily until it is dry for 2 days in a row then she can shower, no tub baths.  Follow-up in 4 weeks.

## 2020-01-06 DIAGNOSIS — Z02.89 PAIN MANAGEMENT CONTRACT SIGNED: ICD-10-CM

## 2020-01-06 DIAGNOSIS — Z02.83 ENCOUNTER FOR DRUG SCREENING: Primary | ICD-10-CM

## 2020-01-06 RX ORDER — HYDROCODONE BITARTRATE AND ACETAMINOPHEN 7.5; 325 MG/1; MG/1
1 TABLET ORAL 2 TIMES DAILY PRN
Qty: 60 TABLET | Refills: 0 | Status: SHIPPED | OUTPATIENT
Start: 2020-01-06 | End: 2020-01-27 | Stop reason: SDUPTHER

## 2020-01-06 NOTE — TELEPHONE ENCOUNTER
LAST OV 12/2/2019  LAST UDS 6/25/2019        ----- Message from Aretha Fowler sent at 1/6/2020  3:19 PM CST -----  Contact: Pt  Type:  RX Refill Request    Who Called:  Pt    Refill or New Rx:  refill  RX Name and Strength:  HYDROcodone-acetaminophen (NORCO) 7.5-325 mg per tablet  How is the patient currently taking it? (ex. 1XDay):  As Directed  Is this a 30 day or 90 day RX:  as Directed  Preferred Pharmacy with phone number:    [id number] - [place] [address] AT [address]  [address] [address]  Phone: [phone] Fax: [phone]  Local or Mail Order:  local  Ordering Provider:  anupam Silva Call Back Number:  [phone]  Additional Information:  Please Advise ---Thank you

## 2020-01-09 ENCOUNTER — OFFICE VISIT (OUTPATIENT)
Dept: UROLOGY | Facility: CLINIC | Age: 65
End: 2020-01-09
Payer: MEDICARE

## 2020-01-09 VITALS
WEIGHT: 173 LBS | RESPIRATION RATE: 16 BRPM | DIASTOLIC BLOOD PRESSURE: 63 MMHG | SYSTOLIC BLOOD PRESSURE: 101 MMHG | HEART RATE: 55 BPM | HEIGHT: 67 IN | TEMPERATURE: 98 F | BODY MASS INDEX: 27.15 KG/M2

## 2020-01-09 DIAGNOSIS — R21 RASH: ICD-10-CM

## 2020-01-09 DIAGNOSIS — N32.81 OAB (OVERACTIVE BLADDER): ICD-10-CM

## 2020-01-09 DIAGNOSIS — Z98.890 STATUS POST CYSTOURETHROSCOPY WITH DILATION OF URETHRAL STRICTURE: ICD-10-CM

## 2020-01-09 DIAGNOSIS — N30.20 CHRONIC CYSTITIS: Primary | ICD-10-CM

## 2020-01-09 PROCEDURE — 99214 OFFICE O/P EST MOD 30 MIN: CPT | Mod: S$GLB,,, | Performed by: NURSE PRACTITIONER

## 2020-01-09 PROCEDURE — 99999 PR PBB SHADOW E&M-EST. PATIENT-LVL IV: CPT | Mod: PBBFAC,,, | Performed by: NURSE PRACTITIONER

## 2020-01-09 PROCEDURE — 3074F SYST BP LT 130 MM HG: CPT | Mod: CPTII,S$GLB,, | Performed by: NURSE PRACTITIONER

## 2020-01-09 PROCEDURE — 99214 PR OFFICE/OUTPT VISIT, EST, LEVL IV, 30-39 MIN: ICD-10-PCS | Mod: S$GLB,,, | Performed by: NURSE PRACTITIONER

## 2020-01-09 PROCEDURE — 3008F PR BODY MASS INDEX (BMI) DOCUMENTED: ICD-10-PCS | Mod: CPTII,S$GLB,, | Performed by: NURSE PRACTITIONER

## 2020-01-09 PROCEDURE — 3078F PR MOST RECENT DIASTOLIC BLOOD PRESSURE < 80 MM HG: ICD-10-PCS | Mod: CPTII,S$GLB,, | Performed by: NURSE PRACTITIONER

## 2020-01-09 PROCEDURE — 99999 PR PBB SHADOW E&M-EST. PATIENT-LVL IV: ICD-10-PCS | Mod: PBBFAC,,, | Performed by: NURSE PRACTITIONER

## 2020-01-09 PROCEDURE — 3074F PR MOST RECENT SYSTOLIC BLOOD PRESSURE < 130 MM HG: ICD-10-PCS | Mod: CPTII,S$GLB,, | Performed by: NURSE PRACTITIONER

## 2020-01-09 PROCEDURE — 3008F BODY MASS INDEX DOCD: CPT | Mod: CPTII,S$GLB,, | Performed by: NURSE PRACTITIONER

## 2020-01-09 PROCEDURE — 3078F DIAST BP <80 MM HG: CPT | Mod: CPTII,S$GLB,, | Performed by: NURSE PRACTITIONER

## 2020-01-09 RX ORDER — METHYLPREDNISOLONE 4 MG/1
TABLET ORAL
Qty: 1 PACKAGE | Refills: 0 | Status: SHIPPED | OUTPATIENT
Start: 2020-01-09 | End: 2020-01-30

## 2020-01-09 RX ORDER — OXYBUTYNIN CHLORIDE 10 MG/1
10 TABLET, EXTENDED RELEASE ORAL DAILY
Qty: 30 TABLET | Refills: 1 | Status: SHIPPED | OUTPATIENT
Start: 2020-01-09 | End: 2020-03-17

## 2020-01-10 NOTE — PROGRESS NOTES
Chief Complaint  Chief Complaint   Patient presents with    Follow-up       HPI:  Alessia Nelson is a 64 y.o. female with medical diagnoses as listed and reviewed within the medical history and problem list that presents for 4 week follow up from cystoscopy. Pt also had a right knee replacement 3 weeks ago. She is doing well. She does report that she is not taking the Myrebetric  Because she did not feel that it was helping her symptoms and the cost was $40. She is requesting to try ditropan. We discussed her cysto and diagnosis of chronic cystitis. We discussed dietary irritants and a packet of information was provided to her. She does report frequency and OAB symptoms. She does feel that she empties her bladder well sometimes and other times she feels that she does not.she denies burning or hematuria. No pelvic pressure or pain. She denies UTI symptoms. Unable to provide a urine today. Will give her a cup and she can bring in specimen later.     PAST MEDICAL HISTORY:  Past Medical History:   Diagnosis Date    Acute pancreatitis     CAD (coronary artery disease)     CHF (congestive heart failure)     COPD (chronic obstructive pulmonary disease)     Depression     Diverticulosis     Encounter for blood transfusion     GERD (gastroesophageal reflux disease)     History of blood clots     1986 AFTER BOWEL RESCETION    History of bowel resection     Hypertension     Peritonitis     Seizures     HAD A SEIZURE FROM PHENERGAN     Thyroid disease     TIA (transient ischemic attack)        PAST SURGICAL HISTORY:  Past Surgical History:   Procedure Laterality Date    ADRENAL GLAND SURGERY      APPENDECTOMY      BACK SURGERY      CHOLECYSTECTOMY      COLONOSCOPY      CORONARY STENT PLACEMENT      CYSTOURETHROSCOPY N/A 11/13/2019    Procedure: CYSTOURETHROSCOPY;  Surgeon: Shun Nuñez MD;  Location: [facility] OR;  Service: Urology;  Laterality: N/A;    ENDOSCOPIC ULTRASOUND OF UPPER GASTROINTESTINAL  TRACT N/A 11/25/2019    Procedure: ULTRASOUND, UPPER GI TRACT, ENDOSCOPIC;  Surgeon: Alhaji Bridges MD;  Location: [facility] ENDO ([address] [place] [address]);  Service: Endoscopy;  Laterality: N/A;  5 day hold Plavix, Dr Jovanni Serrano - pg  PM prep    HERNIA REPAIR      HYSTERECTOMY      INCISIONAL HERNIA REPAIR      INJECTION OF ANESTHETIC AGENT AROUND NERVE Right 5/27/2019    Procedure: RIGHT L5-S3 MEDIAL BRANCH BLOCKS;  Surgeon: Sneha Aragon MD;  Location: [facility] OR;  Service: Pain Management;  Laterality: Right;  **DO NOT STOP PLAVIX**    instestine      KNEE ARTHROPLASTY Right 12/18/2019    Procedure: ARTHROPLASTY, KNEE;  Surgeon: Ike Briceño II, MD;  Location: [facility] OR;  Service: Orthopedics;  Laterality: Right;    KNEE SURGERY  1983    PARATHYROID GLAND SURGERY      3 surgeries    RADIOFREQUENCY ABLATION Right 6/10/2019    Procedure: Radiofrequency Ablation - RIGHT L3-5 RADIOFREQUENCY ABLATION WITH HALYARD COOLIEF THERMAL SYSTEM;  Surgeon: Sneha Aragon MD;  Location: [facility] OR;  Service: Pain Management;  Laterality: Right;  **HOLD PLAVIX x 7 DAYS PRIOR**    UPPER GASTROINTESTINAL ENDOSCOPY         SOCIAL HISTORY:  Social History     Socioeconomic History    Marital status: Single     Spouse name: Not on file    Number of children: 0    Years of education: Not on file    Highest education level: Not on file   Occupational History    Not on file   Social Needs    Financial resource strain: Not on file    Food insecurity:     Worry: Not on file     Inability: Not on file    Transportation needs:     Medical: Not on file     Non-medical: Not on file   Tobacco Use    Smoking status: Current Every Day Smoker     Packs/day: 1.00     Years: 46.00     Pack years: 46.00    Smokeless tobacco: Never Used   Substance and Sexual Activity    Alcohol use: Yes     Comment: RARELY    Drug use: No    Sexual activity: Not Currently   Lifestyle    Physical activity:     Days per week: Not on file     Minutes per  "session: Not on file    Stress: Not on file   Relationships    Social connections:     Talks on phone: Not on file     Gets together: Not on file     Attends Evangelical service: Not on file     Active member of club or organization: Not on file     Attends meetings of clubs or organizations: Not on file     Relationship status: Not on file   Other Topics Concern    Not on file   Social History Narrative    Not on file       FAMILY HISTORY:  Family History   Problem Relation Age of Onset    Stroke Maternal Grandmother     Cancer Maternal Grandfather     Stroke Mother     Heart disease Father        ALLERGIES AND MEDICATIONS: updated and reviewed.  Review of patient's allergies indicates:   Allergen Reactions    Aspirin      "Makes stomach feel like it's on fire"    Lortab [hydrocodone-acetaminophen] Itching    Phenergan [promethazine]      SEIZURES    Promethazine hcl      Current Outpatient Medications   Medication Sig Dispense Refill    acetaminophen (TYLENOL) 325 MG tablet Take 2 tablets (650 mg total) by mouth every 6 (six) hours as needed for Pain (Do not take with any other tylenol containing products).  0    allopurinol (ZYLOPRIM) 300 MG tablet Take 300 mg by mouth once daily.       benazepril (LOTENSIN) 20 MG tablet Take 1 tablet (20 mg total) by mouth once daily. (Patient taking differently: Take 20 mg by mouth 2 (two) times daily. ) 30 tablet 11    buPROPion (WELLBUTRIN XL) 150 MG TB24 tablet Take 1 tablet (150 mg total) by mouth once daily. (Patient taking differently: Take 150 mg by mouth every evening. ) 90 tablet 3    clopidogrel (PLAVIX) 75 mg tablet Take 1 tablet (75 mg total) by mouth once daily. 14 tablet 0    colestipol (COLESTID) 1 gram Tab Take 1 g by mouth daily as needed.       cyanocobalamin, vitamin B-12, (VITAMIN B-12 INJ) Inject as directed every 14 (fourteen) days.      docusate sodium (COLACE) 100 MG capsule Take 1 capsule (100 mg total) by mouth every 12 (twelve) " hours. 60 capsule 0    escitalopram oxalate (LEXAPRO) 20 MG tablet Take 1 tablet (20 mg total) by mouth every evening. 90 tablet 2    gabapentin (NEURONTIN) 400 MG capsule Take 1 capsule (400 mg total) by mouth 4 (four) times daily. (Patient taking differently: Take 400 mg by mouth 2 (two) times daily. ) 360 capsule 3    glycopyrrolate (ROBINUL) 1 mg Tab TAKE 1 TABLET TWICE DAILY (Patient taking differently: 1 mg 2 (two) times daily. TAKE 1 TABLET TWICE DAILY) 180 tablet 0    hydroCHLOROthiazide (HYDRODIURIL) 12.5 MG Tab Take 1 tablet (12.5 mg total) by mouth once daily. 30 tablet 11    HYDROcodone-acetaminophen (NORCO) 7.5-325 mg per tablet Take 1 tablet by mouth every 6 (six) hours as needed for Pain. 28 tablet 0    HYDROcodone-acetaminophen (NORCO) 7.5-325 mg per tablet Take 1 tablet by mouth 2 (two) times daily as needed for Pain. 60 tablet 0    levothyroxine (SYNTHROID) 75 MCG tablet Take 1 tablet (75 mcg total) by mouth once daily. 90 tablet 3    methocarbamol (ROBAXIN) 750 MG Tab Take 750 mg by mouth 2 (two) times daily as needed.       metoprolol tartrate (LOPRESSOR) 100 MG tablet Take 1 tablet (100 mg total) by mouth 2 (two) times daily. 180 tablet 2    multivitamin capsule Take 1 capsule by mouth once daily.      nicotine (NICODERM CQ) 21 mg/24 hr Place 1 patch onto the skin once daily. 30 patch 0    ondansetron (ZOFRAN) 4 MG tablet Take 2 tablets (8 mg total) by mouth every 8 (eight) hours as needed for Nausea. 100 tablet 0    pantoprazole (PROTONIX) 40 MG tablet Take 1 tablet (40 mg total) by mouth once daily. 90 tablet 3    potassium chloride SA (K-DUR,KLOR-CON) 20 MEQ tablet Take 1 tablet (20 mEq total) by mouth once daily. (Patient taking differently: Take 20 mEq by mouth every evening. ) 90 tablet 2    ranitidine (ZANTAC) 150 MG tablet Take 150 mg by mouth nightly.       rosuvastatin (CRESTOR) 20 MG tablet Take 1 tablet (20 mg total) by mouth once daily. 90 tablet 3    traZODone  "(DESYREL) 100 MG tablet TAKE 1 TABLET (100 MG TOTAL) BY MOUTH EVERY EVENING. 90 tablet 3    methylPREDNISolone (MEDROL DOSEPACK) 4 mg tablet use as directed 1 Package 0    oxybutynin (DITROPAN-XL) 10 MG 24 hr tablet Take 1 tablet (10 mg total) by mouth once daily. 30 tablet 1     No current facility-administered medications for this visit.          ROS  Review of Systems   Constitutional: Positive for fatigue. Negative for activity change, chills and fever.   Respiratory: Negative for shortness of breath.    Cardiovascular: Negative for chest pain and palpitations.   Gastrointestinal: Negative for abdominal pain, nausea and vomiting.   Genitourinary: Positive for frequency. Negative for decreased urine volume, difficulty urinating, dysuria, flank pain, hematuria, menstrual problem, pelvic pain, urgency, vaginal bleeding and vaginal discharge.   Musculoskeletal: Positive for arthralgias, back pain, gait problem, myalgias and neck pain.   Skin: Negative for color change, rash and wound.   Neurological: Negative for dizziness, syncope and headaches.   Psychiatric/Behavioral: Negative for agitation and dysphoric mood. The patient is not nervous/anxious.            PHYSICAL EXAM  Vitals:    01/09/20 1120   BP: 101/63   Pulse: (!) 55   Resp: 16   Temp: 98 °F (36.7 °C)   TempSrc: Oral   Weight: 78.5 kg (173 lb)   Height: 5' 7" (1.702 m)    Body mass index is 27.1 kg/m².  Weight: 78.5 kg (173 lb)   Height: 5' 7" (170.2 cm)       Physical Exam   Constitutional: She is oriented to person, place, and time. She appears well-developed and well-nourished.   HENT:   Head: Normocephalic.   Eyes: Pupils are equal, round, and reactive to light.   Neck: Normal range of motion.   Cardiovascular: Normal rate, S1 normal and S2 normal.   Pulmonary/Chest: Effort normal.   Abdominal: Soft. Normal appearance. She exhibits no distension. There is no tenderness.   Musculoskeletal: Normal range of motion.   Neurological: She is alert and " oriented to person, place, and time.   Skin: Skin is warm and dry. Capillary refill takes less than 2 seconds.   Psychiatric: She has a normal mood and affect. Her speech is normal and behavior is normal. Thought content normal. Cognition and memory are normal.   Vitals reviewed.        Health Maintenance       Date Due Completion Date    Hepatitis C Screening 1955 ---    Mammogram 10/05/1996 ---    Shingles Vaccine (1 of 2) 10/05/2006 ---    Urine Drug Screen 06/25/2020 6/25/2019    Lipid Panel 07/24/2020 7/24/2019    LDCT Lung Screen 10/03/2020 10/3/2019    Colonoscopy 02/05/2025 2/5/2015    TETANUS VACCINE 12/12/2029 12/12/2019               Assessment & Plan    Alessia was seen today for follow-up.    Diagnoses and all orders for this visit:    Chronic cystitis  -     oxybutynin (DITROPAN-XL) 10 MG 24 hr tablet; Take 1 tablet (10 mg total) by mouth once daily.    OAB (overactive bladder)  -     oxybutynin (DITROPAN-XL) 10 MG 24 hr tablet; Take 1 tablet (10 mg total) by mouth once daily.    Status post cystourethroscopy with dilation of urethral stricture  -     oxybutynin (DITROPAN-XL) 10 MG 24 hr tablet; Take 1 tablet (10 mg total) by mouth once daily.    Rash  -     methylPREDNISolone (MEDROL DOSEPACK) 4 mg tablet; use as directed        Follow-up: Follow up in about 4 weeks (around 2/6/2020).      Risks, benefits, and side effects were discussed with the patient. All questions were answered to the fullest satisfaction of the patient, and pt verbalized understanding and agreement to treatment plan. Pt was to call with any new or worsening symptoms, or present to the ER.

## 2020-01-23 ENCOUNTER — PATIENT OUTREACH (OUTPATIENT)
Dept: ADMINISTRATIVE | Facility: OTHER | Age: 65
End: 2020-01-23

## 2020-01-23 NOTE — PROGRESS NOTES
Chart reviewed.   Immunizations: updated  Orders placed: mammo order in Epic  Upcoming appts: n/a

## 2020-01-27 ENCOUNTER — LAB VISIT (OUTPATIENT)
Dept: LAB | Facility: HOSPITAL | Age: 65
End: 2020-01-27
Attending: ANESTHESIOLOGY
Payer: MEDICARE

## 2020-01-27 ENCOUNTER — OFFICE VISIT (OUTPATIENT)
Dept: PAIN MEDICINE | Facility: CLINIC | Age: 65
End: 2020-01-27
Payer: MEDICARE

## 2020-01-27 VITALS
DIASTOLIC BLOOD PRESSURE: 63 MMHG | HEART RATE: 54 BPM | HEIGHT: 67 IN | WEIGHT: 178.44 LBS | SYSTOLIC BLOOD PRESSURE: 110 MMHG | TEMPERATURE: 99 F | OXYGEN SATURATION: 97 % | BODY MASS INDEX: 28.01 KG/M2

## 2020-01-27 DIAGNOSIS — Z02.83 ENCOUNTER FOR DRUG SCREENING: ICD-10-CM

## 2020-01-27 DIAGNOSIS — M53.3 SACROILIAC JOINT PAIN: ICD-10-CM

## 2020-01-27 DIAGNOSIS — Z02.89 PAIN MANAGEMENT CONTRACT SIGNED: ICD-10-CM

## 2020-01-27 DIAGNOSIS — M70.61 GREATER TROCHANTERIC BURSITIS OF RIGHT HIP: ICD-10-CM

## 2020-01-27 DIAGNOSIS — Z79.891 OPIOID CONTRACT EXISTS: ICD-10-CM

## 2020-01-27 DIAGNOSIS — Z96.651 STATUS POST TOTAL RIGHT KNEE REPLACEMENT: ICD-10-CM

## 2020-01-27 DIAGNOSIS — M96.1 POSTLAMINECTOMY SYNDROME OF LUMBAR REGION: ICD-10-CM

## 2020-01-27 DIAGNOSIS — G89.4 CHRONIC PAIN DISORDER: Primary | ICD-10-CM

## 2020-01-27 PROCEDURE — 99214 PR OFFICE/OUTPT VISIT, EST, LEVL IV, 30-39 MIN: ICD-10-PCS | Mod: S$GLB,,, | Performed by: ANESTHESIOLOGY

## 2020-01-27 PROCEDURE — 3078F PR MOST RECENT DIASTOLIC BLOOD PRESSURE < 80 MM HG: ICD-10-PCS | Mod: CPTII,S$GLB,, | Performed by: ANESTHESIOLOGY

## 2020-01-27 PROCEDURE — 3074F SYST BP LT 130 MM HG: CPT | Mod: CPTII,S$GLB,, | Performed by: ANESTHESIOLOGY

## 2020-01-27 PROCEDURE — 3078F DIAST BP <80 MM HG: CPT | Mod: CPTII,S$GLB,, | Performed by: ANESTHESIOLOGY

## 2020-01-27 PROCEDURE — 3008F BODY MASS INDEX DOCD: CPT | Mod: CPTII,S$GLB,, | Performed by: ANESTHESIOLOGY

## 2020-01-27 PROCEDURE — 80307 DRUG TEST PRSMV CHEM ANLYZR: CPT

## 2020-01-27 PROCEDURE — 3008F PR BODY MASS INDEX (BMI) DOCUMENTED: ICD-10-PCS | Mod: CPTII,S$GLB,, | Performed by: ANESTHESIOLOGY

## 2020-01-27 PROCEDURE — 99999 PR PBB SHADOW E&M-EST. PATIENT-LVL III: ICD-10-PCS | Mod: PBBFAC,,, | Performed by: ANESTHESIOLOGY

## 2020-01-27 PROCEDURE — 99999 PR PBB SHADOW E&M-EST. PATIENT-LVL III: CPT | Mod: PBBFAC,,, | Performed by: ANESTHESIOLOGY

## 2020-01-27 PROCEDURE — 99214 OFFICE O/P EST MOD 30 MIN: CPT | Mod: S$GLB,,, | Performed by: ANESTHESIOLOGY

## 2020-01-27 PROCEDURE — 3074F PR MOST RECENT SYSTOLIC BLOOD PRESSURE < 130 MM HG: ICD-10-PCS | Mod: CPTII,S$GLB,, | Performed by: ANESTHESIOLOGY

## 2020-01-27 RX ORDER — HYDROCODONE BITARTRATE AND ACETAMINOPHEN 7.5; 325 MG/1; MG/1
1 TABLET ORAL 2 TIMES DAILY PRN
Qty: 60 TABLET | Refills: 0 | Status: SHIPPED | OUTPATIENT
Start: 2020-01-27 | End: 2020-03-03 | Stop reason: SDUPTHER

## 2020-01-27 RX ORDER — CYANOCOBALAMIN 1000 UG/ML
INJECTION, SOLUTION INTRAMUSCULAR; SUBCUTANEOUS
Qty: 6 ML | Refills: 0 | Status: SHIPPED | OUTPATIENT
Start: 2020-01-27 | End: 2020-03-04 | Stop reason: CLARIF

## 2020-01-27 NOTE — PATIENT INSTRUCTIONS
Please ask Dr. Briceño when you would be able to have a steroid injection again.      Recommend using Miralax daily as recommended as needed for constipation

## 2020-01-28 DIAGNOSIS — I10 ESSENTIAL HYPERTENSION: ICD-10-CM

## 2020-01-28 DIAGNOSIS — Z96.651 STATUS POST RIGHT KNEE REPLACEMENT: Primary | ICD-10-CM

## 2020-01-28 RX ORDER — POTASSIUM CHLORIDE 20 MEQ/1
20 TABLET, EXTENDED RELEASE ORAL DAILY
Qty: 90 TABLET | Refills: 3 | Status: SHIPPED | OUTPATIENT
Start: 2020-01-28 | End: 2020-04-27

## 2020-01-28 RX ORDER — POTASSIUM CHLORIDE 20 MEQ/1
20 TABLET, EXTENDED RELEASE ORAL DAILY
Qty: 14 TABLET | Refills: 0 | Status: SHIPPED | OUTPATIENT
Start: 2020-01-28 | End: 2020-02-11

## 2020-01-28 RX ORDER — POTASSIUM CHLORIDE 20 MEQ/1
20 TABLET, EXTENDED RELEASE ORAL ONCE
Qty: 14 TABLET | Refills: 0 | OUTPATIENT
Start: 2020-01-28 | End: 2020-01-28

## 2020-01-28 NOTE — TELEPHONE ENCOUNTER
Refills pended per pts request.  Please advise, thank you.         ----- Message from Georgi Sotelo sent at 1/28/2020 12:17 PM CST -----  Type:  RX Refill Request    Who Called: self   Refill or New Rx: refill   RX Name and Strength:  Potassium How is the patient currently taking it? (ex. 1XDay):   Is this a 30 day or 90 day RX:  2 week supply with local pharmacy  Preferred Pharmacy with phone number: 2 week supply with [facility] in [place] 90 day supply with [facility] mail order pharmacy  Local or Mail Order:  Local and mail order   Ordering Provider: Ms Bradley  Ricardo Call Back Number:  [phone]   Additional Information:  Patient is out of the rx,pt requesting to get rx for 2 week supply with the local pharmacy until pt get rx from [facility] mail order pharmacy.

## 2020-01-30 ENCOUNTER — OFFICE VISIT (OUTPATIENT)
Dept: ORTHOPEDICS | Facility: CLINIC | Age: 65
End: 2020-01-30
Payer: MEDICARE

## 2020-01-30 ENCOUNTER — HOSPITAL ENCOUNTER (OUTPATIENT)
Dept: RADIOLOGY | Facility: HOSPITAL | Age: 65
Discharge: HOME | End: 2020-01-30
Attending: ORTHOPAEDIC SURGERY
Payer: MEDICARE

## 2020-01-30 VITALS
HEIGHT: 67 IN | WEIGHT: 178 LBS | BODY MASS INDEX: 27.94 KG/M2 | HEART RATE: 54 BPM | DIASTOLIC BLOOD PRESSURE: 56 MMHG | SYSTOLIC BLOOD PRESSURE: 113 MMHG

## 2020-01-30 DIAGNOSIS — Z96.651 STATUS POST RIGHT KNEE REPLACEMENT: ICD-10-CM

## 2020-01-30 DIAGNOSIS — Z96.651 STATUS POST RIGHT KNEE REPLACEMENT: Primary | ICD-10-CM

## 2020-01-30 PROCEDURE — 73560 X-RAY EXAM OF KNEE 1 OR 2: CPT | Mod: TC,PN,RT

## 2020-01-30 PROCEDURE — 99999 PR PBB SHADOW E&M-EST. PATIENT-LVL III: ICD-10-PCS | Mod: PBBFAC,,, | Performed by: ORTHOPAEDIC SURGERY

## 2020-01-30 PROCEDURE — 73560 XR KNEE 1 OR 2 VIEW RIGHT: ICD-10-PCS | Mod: 26,RT,, | Performed by: RADIOLOGY

## 2020-01-30 PROCEDURE — 99024 POSTOP FOLLOW-UP VISIT: CPT | Mod: S$GLB,,, | Performed by: ORTHOPAEDIC SURGERY

## 2020-01-30 PROCEDURE — 99999 PR PBB SHADOW E&M-EST. PATIENT-LVL III: CPT | Mod: PBBFAC,,, | Performed by: ORTHOPAEDIC SURGERY

## 2020-01-30 PROCEDURE — 73560 X-RAY EXAM OF KNEE 1 OR 2: CPT | Mod: 26,RT,, | Performed by: RADIOLOGY

## 2020-01-30 PROCEDURE — 99024 PR POST-OP FOLLOW-UP VISIT: ICD-10-PCS | Mod: S$GLB,,, | Performed by: ORTHOPAEDIC SURGERY

## 2020-01-30 RX ORDER — ALLOPURINOL 300 MG/1
300 TABLET ORAL DAILY
Qty: 90 TABLET | Refills: 3 | Status: SHIPPED | OUTPATIENT
Start: 2020-01-30 | End: 2021-04-29

## 2020-01-30 RX ORDER — BACLOFEN 10 MG/1
10 TABLET ORAL 2 TIMES DAILY
COMMUNITY
Start: 2020-01-06 | End: 2020-04-11 | Stop reason: SDUPTHER

## 2020-01-30 NOTE — PROGRESS NOTES
CC:  64-year-old female follows up status post right total knee arthroplasty.  Date of surgery was 12/18/2019.  Patient currently has no complaints and rates her pain as a 1/10.  She has been participating with home health physical therapy.    Examination of the Right Lower Extremity:     The incision is healing well with no signs of infection.  The patient does have multiple cuts over her lower legs because she tried to shave today  Motor function is intact distally EHL/FHL/TA/luz marina   +2 dorsalis pedis and posterior tibial pulses   Sensation to light touch intact distally dorsal, plantar, and first web space     Examination of the Right knee:    ROM 0 - 130   Effusion negative  Tenderness to palpation at the joint line negative  Pain during range of motion negative  Crepitation during range of motion negative     negative increased pain noted with flexion past 90   negative antalgic gait noted   negative Varus/Valgus instability    X-rays were examined and personally reviewed by me.  Three views of the right knee dated 01/30/2020 are available for review.  There is a right total knee arthroplasty that is well fixed and in good alignment.    Dx:  Status post right total knee arthroplasty, stable    Plan:  Transition outpatient therapy.  The patient can drive at this point.  Follow-up in 6 weeks with an x-ray.

## 2020-01-30 NOTE — TELEPHONE ENCOUNTER
----- Message from Georgi Sotelo sent at 1/30/2020 11:22 AM CST -----  Type: Needs Medical Advice    Who Called: self   Symptoms (please be specific):  How long has patient had these symptoms:   Pharmacy name and phone #: NA  Best Call Back Number: [phone]  Additional Information: Patient called asking for an update for rx refill potassium. Pt requesting rx refill allopurinol (ZYLOPRIM) 300 MG tablet

## 2020-02-01 LAB
6MAM UR QL: NOT DETECTED
7AMINOCLONAZEPAM UR QL: NOT DETECTED
A-OH ALPRAZ UR QL: NOT DETECTED
ALPRAZ UR QL: NOT DETECTED
AMPHET UR QL SCN: NOT DETECTED
ANNOTATION COMMENT IMP: NORMAL
ANNOTATION COMMENT IMP: NORMAL
BARBITURATES UR QL: NOT DETECTED
BUPRENORPHINE UR QL: NOT DETECTED
BZE UR QL: NOT DETECTED
CARBOXYTHC UR QL: NOT DETECTED
CARISOPRODOL UR QL: NOT DETECTED
CLONAZEPAM UR QL: NOT DETECTED
CODEINE UR QL: NOT DETECTED
CREAT UR-MCNC: 114.7 MG/DL (ref 20–400)
DIAZEPAM UR QL: NOT DETECTED
ETHYL GLUCURONIDE UR QL: NOT DETECTED
FENTANYL UR QL: NOT DETECTED
HYDROCODONE UR QL: PRESENT
HYDROMORPHONE UR QL: NOT DETECTED
LORAZEPAM UR QL: NOT DETECTED
MDA UR QL: NOT DETECTED
MDEA UR QL: NOT DETECTED
MDMA UR QL: NOT DETECTED
ME-PHENIDATE UR QL: NOT DETECTED
MEPERIDINE UR QL: NOT DETECTED
METHADONE UR QL: NOT DETECTED
METHAMPHET UR QL: NOT DETECTED
MIDAZOLAM UR QL SCN: NOT DETECTED
MORPHINE UR QL: NOT DETECTED
NORBUPRENORPHINE UR QL CFM: NOT DETECTED
NORDIAZEPAM UR QL: NOT DETECTED
NORFENTANYL UR QL: NOT DETECTED
NORHYDROCODONE UR QL CFM: NOT DETECTED
NOROXYCODONE UR QL CFM: NOT DETECTED
NOROXYMORPHONE: NOT DETECTED
OXAZEPAM UR QL: NOT DETECTED
OXYCODONE UR QL: NOT DETECTED
OXYMORPHONE UR QL: NOT DETECTED
PATHOLOGY STUDY: NORMAL
PCP UR QL: NOT DETECTED
PHENTERMINE UR QL: NOT DETECTED
PROPOXYPH UR QL: NOT DETECTED
SERVICE CMNT-IMP: NORMAL
TAPENTADOL UR QL SCN: NOT DETECTED
TAPENTADOL-O-SULF: NOT DETECTED
TEMAZEPAM UR QL: NOT DETECTED
TRAMADOL UR QL: NOT DETECTED
ZOLPIDEM UR QL: NOT DETECTED

## 2020-02-12 ENCOUNTER — CLINICAL SUPPORT (OUTPATIENT)
Dept: REHABILITATION | Facility: HOSPITAL | Age: 65
End: 2020-02-12
Payer: MEDICARE

## 2020-02-12 DIAGNOSIS — Z96.651 STATUS POST RIGHT KNEE REPLACEMENT: Primary | ICD-10-CM

## 2020-02-12 PROCEDURE — 97161 PT EVAL LOW COMPLEX 20 MIN: CPT | Mod: PN

## 2020-02-12 NOTE — PLAN OF CARE
Physical Therapy Evaluation/Plan of Care    Name: Alessia Nelson  Clinic Number: [id number]    Therapy Diagnosis:   Encounter Diagnosis   Name Primary?    Status post right knee replacement Yes     Physician: Ike Briceño II, MD    Physician Orders: PT Eval and Treat  Medical Diagnosis: Right TKA   Evaluation Date: 2/12/2020  Authorization period Expiration: 12/31/2020   Plan of Care Certification Period: 5/31/2020     Visit #: 1/ Visits authorized: 18  Time In:11:00 AM   Time Out: 11:50 AM   Total Billable Time: 50  minutes    Precautions: Standard      Subjective   Date of onset: chronic   Date of Surgery: 12/18/2019        Past Medical History:   Diagnosis Date    Acute pancreatitis     CAD (coronary artery disease)     CHF (congestive heart failure)     COPD (chronic obstructive pulmonary disease)     Depression     Diverticulosis     Encounter for blood transfusion     GERD (gastroesophageal reflux disease)     History of blood clots     1986 AFTER BOWEL RESCETION    History of bowel resection     Hypertension     Peritonitis     Seizures     HAD A SEIZURE FROM PHENERGAN     Thyroid disease     TIA (transient ischemic attack)      Alessia Nelson  has a past surgical history that includes Parathyroid gland surgery; instestine; Hernia repair; Hysterectomy; Cholecystectomy; Adrenal gland surgery; Colonoscopy; Upper gastrointestinal endoscopy; Incisional hernia repair; Injection of anesthetic agent around nerve (Right, 5/27/2019); Radiofrequency ablation (Right, 6/10/2019); Coronary stent placement; Appendectomy; Back surgery; Cystourethroscopy (N/A, 11/13/2019); Endoscopic ultrasound of upper gastrointestinal tract (N/A, 11/25/2019); Knee surgery (1983); and Knee Arthroplasty (Right, 12/18/2019).    Alessia has a current medication list which includes the following prescription(s): acetaminophen, allopurinol, baclofen, benazepril, bupropion, clopidogrel, colestipol, cyanocobalamin,  "cyanocobalamin (vitamin b-12), escitalopram oxalate, gabapentin, glycopyrrolate, hydrochlorothiazide, hydrocodone-acetaminophen, levothyroxine, metoprolol tartrate, multivitamin, ondansetron, oxybutynin, pantoprazole, potassium chloride sa, ranitidine, rosuvastatin, and trazodone.    Review of patient's allergies indicates:   Allergen Reactions    Aspirin      "Makes stomach feel like it's on fire"    Lortab [hydrocodone-acetaminophen] Itching    Phenergan [promethazine]      SEIZURES    Promethazine hcl         Imaging, : Xray - well aligned prosthesis right knee     Prior Therapy: Home Health Physical Therapy  for current condition up until 2 weeks ago   Social History: Patient lives in a single level  home with 0 steps to enter ; lives alone; walk in shower; 2  Occupation: retired   Prior Level of Function: Independent   Current Level of Function: Independent at home with daily activities; ambulating without any AD;     Pain:  Current 5/10, worst 8/10, best 3/10   Location: knee  right  Description: Aching and Throbbing  Aggravating Factors: Morning and Getting out of bed/chair  Easing Factors: ice      Onset/BENITEZ: gradual    Primary concern/ Chief complaints:  History of current condition - Alessia reports: long  history of right knee pain resulting in Right TKA , Alessia reports that she is able to get around her home independently; She has not used the walker in two weeks; she is not using a cane - stated that using one just doesn't work well for her.  Alessia is able to drive; she is still taking pain meds - for her knee and for fibromyalgia; Alessia stated that she was doing really well until she tried to pick-up something off of the floor on Saturday - did a mini squat and felt increased pain in her right knee - stated it has been more swollen since then.     Pts goals: To return to full function - RLE     Objective     Observation: Alessia ambulated into clinic; no AD; stated that she bent over to  " "something Saturday, and her knee has been more painful since this time; She is walking with antalgic gait pattern, decreased knee flexion on swing through;     Posture: Slight forward head posture; weight shifting onto LLE; right knee flexed       Edema: minimal edema noted in Right knee     Range of Motion:   Knee Left active Left Passive Right Active R passive   Flexion 127 127 96 98   Extension 0 0 1 1       Lower Extremity Strength  Right LE  Left LE    Knee extension: 4/5 Knee extension: 5/5   Knee flexion: 4/5 Knee flexion: 5/5   Hip flexion: 4/5 Hip flexion: 5/5   Hip extension:  4+/5 Hip extension: 5/5   Hip abduction: 4+/5 Hip abduction: 5/5   Hip adduction: 4/5 Hip adduction 4/5   Ankle dorsiflexion: 5/5 Ankle dorsiflexion: 5/5   Ankle plantarflexion: 5/5 Ankle plantarflexion: 5/5         Step down test: decreased eccentric control of her right quad from a 6" step noted - cannot sustain     Function:    - SLS R: 5 secs   - SLS L: 10 secs   - Squat: mini squat only    - Sit <--> Stand:  9 reps in 30 secs; weight shifts into Left LE - 21" height     Joint Mobility: Patellar unrestricted,   Tibiofemoral restricted,     Palpation: moderate tenderness to palpation at medial aspect of knee - posterior medial hamstring       Sensation: intact to light touch; incision is healing; still has a few scabs left; skin is extremely dry and flaky; recommended Alessia start putting some lotion on skin; Vit E on incision/     Flexibility:    90/90 SLR = R moderate restriction, L minimal restriction   Ely's test: R moderate restriction, L minimal restriction   Prashanth's test: R no restriction, L no restriction   Bebeto test: R no restriction, L no restriction    PT Evaluation Completed: Yes  Discussed Plan of Care with patient: Yes      TREATMENT       Home Exercises and Patient Education Provided    Education provided re:   - progress towards goals   - role of therapy in multi - disciplinary team, goals for therapy  Pt educated " on condition, POC, and expectations in therapy.  No spiritual or educational barriers to learning provided    Home exercises:  Pt will be provided HEP during course of treatment with progressions as appropriate. Pt was advised to perform these exercises free of pain, and to stop performing them if pain occurs.   Alessia demonstrated good  understanding of the education provided.       Functional Limitations Reports - G Codes  Category: Mobility, Body position, Carrying, Self care, Other  Tool: LEFS   Score: 70% limitation     Assessment   Alessia is a 64 y.o. female referred to outpatient physical therapy and presents to PT with S/P Right TKA . Alessia is almost 2 months post-op; She has been getting Home Health PT up until 2 weeks ago.  Alessia demonstrates decreased knee A/PROM, gait deficits, strength defitis and balance deficits. as described in the problem list. Pt will benefit from physcial therapy services in order to maximize pain free and/or functional use of right LE. The following goals were discussed with the patient and patient is in agreement with them as to be addressed in the treatment plan.   Pt prognosis is Excellent.   Pt will benefit from skilled outpatient Physical Therapy to address the deficits stated above and in the chart below, provide pt/family education, and to maximize pt's level of independence.     Plan of care discussed with patient: Yes  Pt's spiritual, cultural and educational needs considered and pt agreeable to plan of care and goals as stated below:     Anticipated Barriers for therapy: none    Medical necessity is demonstrated by the following IMPAIRMENTS/PROBLEM LIST:    weakness, impaired endurance, impaired functional mobility, gait instability, impaired balance, decreased lower extremity function, pain, decreased ROM, impaired joint extensibility and impaired muscle length    GOALS:     Long Term Goals: 6 weeks  Pain: Decrease pain to no more than 1/10 to allow for improved ability  to perform daily and recreational activities   Strength: Improve strength in right Hip to Knee to 5/5 for improved LE stability  ROM: Improve ROM to equal Her left knee - full extension and flexion to 125.    Functional scale: Improve score on LEFS  to < 35% limitation   Lifting: Lift 15 lbs to waist level, and carry for 25 feet without pain or compensation  Walking: Increase walking distance/duration to 1/4 mile without pain  Postures: Increase sitting and/or standing duration to 60 mins  without pain   Transfers: Perform sit <> stand transfers without increased pain or limitation from regular chair height; able to complete 9 in 30 secs.   Exercise: demonstrate independence with home exercise program to maintain gains made in therapy.          Plan   Certification Period: 2/12/2020 to 5/30/2020 .    Outpatient physical therapy 2 -3 times weekly to include: Gait Training, Manual Therapy, Moist Heat/ Ice, Neuromuscular Re-ed, Patient Education and Therapeutic Exercise. Cont PT for 6 weeks.   Pt may be seen by PTA as part of the rehabilitation team.     I certify the need for these services furnished under this plan of treatment and while under my care.    Trinh Reyes, PT          Attestation:   I have seen the patient, reviewed the therapist's plan of care, and I agree with the plan of care.   I certify the need for these services furnished under this plan of treatment and while under my care.         _______________            ________                                               _____________________  Physician/Referring Practitioner                                                            Date of Signature

## 2020-02-14 ENCOUNTER — CLINICAL SUPPORT (OUTPATIENT)
Dept: REHABILITATION | Facility: HOSPITAL | Age: 65
End: 2020-02-14
Payer: MEDICARE

## 2020-02-14 DIAGNOSIS — Z96.651 STATUS POST RIGHT KNEE REPLACEMENT: Primary | ICD-10-CM

## 2020-02-14 PROCEDURE — 97110 THERAPEUTIC EXERCISES: CPT | Mod: PN

## 2020-02-14 PROCEDURE — 97140 MANUAL THERAPY 1/> REGIONS: CPT | Mod: PN

## 2020-02-14 NOTE — PROGRESS NOTES
Physical Therapy Daily Note     Name: Alessia Nelson  Clinic Number: [id number]  Diagnosis:   Encounter Diagnosis   Name Primary?    Status post right knee replacement Yes     Physician: Ike Briceño II, MD  Precautions: standard   Visit #: 2 of 18  PTA Visit #: 0  Time In: 1:00 PM   Time Out: 2:00 PM     Subjective     Pt reports: My knee is sore - medial aspect ; Ambulates with antalgic gait without use of an AD; decreased flexion of right knee on swing through noted.   Pain Scale: Alessia rates pain on a scale of 0-10 to be 4 currently.    Objective     Alessia received individual therapeutic exercises to develop strength and ROM for 40 minutes including:    Nu-Step x 15 mins (attempted Recumbent bike - unable to keep bike going effectively)  Quad Sets x 4 mins - focus on knee extension   Heel Slides x 3 mins  SLR x 15  Ball Squeezes x 3 mins  Bridges x 15  Clams x 15  S/L Hip Abduction x 15  Step-ups x 2 mins       Alessia received the following manual therapy techniques: Joint mobilizations and Soft tissue Mobilization were applied to the: right knee  for 12 minutes including:  STM to distal quad mm with progressive knee flexion; patellar mobilization in all planes;  J;oint mobilization into flexion and extension with end range holds.     The patient received the following direct contact modalities after being cleared for contraindications:     The patient received the following supervised modalities after being cleared for contradictions:     Written Home Exercises Provided:   Quad Sets;   Pt demo good understanding of the education provided. Alessia demonstrated good return demonstration of activities.     Education provided re:  Alessia verbalized good understanding of education provided.   No spiritual or educational barriers to learning provided    Assessment     Patient tolerated treatment well; She is still having francis in her knee, ambulates with antalgic  gait pattern; not pushing herself much at home.   This is a 64 y.o. female referred to outpatient physical therapy and presents with a medical diagnosis of s/p Right TKA  and demonstrates limitations as described in the problem list. Pt prognosis is Good. Pt will continue to benefit from skilled outpatient physical therapy to address the deficits listed in the problem list, provide pt/family education and to maximize pt's level of independence in the home and community environment.     Goals as follows:    Long Term Goals: 6 weeks  Pain: Decrease pain to no more than 1/10 to allow for improved ability to perform daily and recreational activities   Strength: Improve strength in right Hip to Knee to 5/5 for improved LE stability  ROM: Improve ROM to equal Her left knee - full extension and flexion to 125.    Functional scale: Improve score on LEFS  to < 35% limitation   Lifting: Lift 15 lbs to waist level, and carry for 25 feet without pain or compensation  Walking: Increase walking distance/duration to 1/4 mile without pain  Postures: Increase sitting and/or standing duration to 60 mins  without pain   Transfers: Perform sit <> stand transfers without increased pain or limitation from regular chair height; able to complete 9 in 30 secs.   Exercise: demonstrate independence with home exercise program to maintain gains made in therapy.          Plan     Continue with established Plan of Care towards PT goals.    Therapist: Trinh Reyes, PT  2/14/2020

## 2020-02-17 ENCOUNTER — CLINICAL SUPPORT (OUTPATIENT)
Dept: REHABILITATION | Facility: HOSPITAL | Age: 65
End: 2020-02-17
Payer: MEDICARE

## 2020-02-17 DIAGNOSIS — Z96.651 STATUS POST RIGHT KNEE REPLACEMENT: Primary | ICD-10-CM

## 2020-02-17 PROCEDURE — 97110 THERAPEUTIC EXERCISES: CPT | Mod: PN,CQ

## 2020-02-17 NOTE — PROGRESS NOTES
Physical Therapy Daily Note     Name: Alessia Nelson  Clinic Number: [id number]  Diagnosis:   Encounter Diagnosis   Name Primary?    Status post right knee replacement Yes     Physician: Ike Briceño II, MD  Precautions: standard   Visit #: 3 of 18  PTA Visit #: 1  Time In: 1:00 PM   Time Out: 2:00 PM     Subjective     Pt reports: My knee has been swollen; Ambulates with antalgic gait without use of an AD; decreased flexion of right knee on swing through noted.   Pain Scale: Alessia rates pain on a scale of 0-10 to be 8 currently.    Objective     Alessia received individual therapeutic exercises to develop strength and ROM for 40 minutes including:    Nu-Step x 20 mins   Quad Sets x 4 mins - focus on knee extension   Heel Slides x 3 mins  SLR x 15  Ball Squeezes x 3 mins  Bridges x 15  Clams x 15  S/L Hip Abduction x 15  Heel Raises x 20  Toe Taps on Air-ex mat to silver step x 2 mins  Step-ups x 20    DNP  Alessia received the following manual therapy techniques: Joint mobilizations and Soft tissue Mobilization were applied to the: right knee  for 12 minutes including:  STM to distal quad mm with progressive knee flexion; patellar mobilization in all planes;  J;oint mobilization into flexion and extension with end range holds.     The patient received the following direct contact modalities after being cleared for contraindications:     The patient received the following supervised modalities after being cleared for contradictions:     Written Home Exercises Provided:   Quad Sets;   Pt demo good understanding of the education provided. Alessia demonstrated good return demonstration of activities.     Education provided re:  Alessia verbalized good understanding of education provided.   No spiritual or educational barriers to learning provided    Assessment     Patient tolerated treatment well; She is still having francis in her knee, ambulates with antalgic gait  pattern; not pushing herself much at home.   This is a 64 y.o. female referred to outpatient physical therapy and presents with a medical diagnosis of s/p Right TKA  and demonstrates limitations as described in the problem list. Pt prognosis is Good. Pt will continue to benefit from skilled outpatient physical therapy to address the deficits listed in the problem list, provide pt/family education and to maximize pt's level of independence in the home and community environment.     Goals as follows:    Long Term Goals: 6 weeks  Pain: Decrease pain to no more than 1/10 to allow for improved ability to perform daily and recreational activities   Strength: Improve strength in right Hip to Knee to 5/5 for improved LE stability  ROM: Improve ROM to equal Her left knee - full extension and flexion to 125.    Functional scale: Improve score on LEFS  to < 35% limitation   Lifting: Lift 15 lbs to waist level, and carry for 25 feet without pain or compensation  Walking: Increase walking distance/duration to 1/4 mile without pain  Postures: Increase sitting and/or standing duration to 60 mins  without pain   Transfers: Perform sit <> stand transfers without increased pain or limitation from regular chair height; able to complete 9 in 30 secs.   Exercise: demonstrate independence with home exercise program to maintain gains made in therapy.          Plan     Continue with established Plan of Care towards PT goals.    Therapist: Jonathan Favre, PTA  2/17/2020

## 2020-02-19 RX ORDER — CLOPIDOGREL BISULFATE 75 MG/1
75 TABLET ORAL DAILY
Qty: 90 TABLET | Refills: 0 | Status: SHIPPED | OUTPATIENT
Start: 2020-02-19 | End: 2020-05-28

## 2020-02-20 ENCOUNTER — CLINICAL SUPPORT (OUTPATIENT)
Dept: REHABILITATION | Facility: HOSPITAL | Age: 65
End: 2020-02-20
Payer: MEDICARE

## 2020-02-20 DIAGNOSIS — Z96.651 STATUS POST RIGHT KNEE REPLACEMENT: Primary | ICD-10-CM

## 2020-02-20 PROCEDURE — 97110 THERAPEUTIC EXERCISES: CPT | Mod: PN,CQ

## 2020-02-20 NOTE — PROGRESS NOTES
Physical Therapy Daily Note     Name: Alessia Nelson  Clinic Number: [id number]  Diagnosis:   Encounter Diagnosis   Name Primary?    Status post right knee replacement Yes     Physician: Ike Briceño II, MD  Precautions: standard   Visit #: 4 of 18  PTA Visit #: 2  Time In: 1:00 PM   Time Out: 2:10 PM     Subjective     Pt reports: Since I saw the doctor, I squatted down to  something off of the floor and my right knee has really been hurting; It has been downhill since then- pain is pretty much constant and it stays more swollen; Ambulates with antalgic gait without use of an AD; decreased flexion of right knee on swing through noted.   Pain Scale: Alessia rates pain on a scale of 0-10 to be 8 currently.    Objective     Alessia received individual therapeutic exercises to develop strength and ROM for 40 minutes including:    Nu-Step x 20 mins   Quad Sets x 4 mins - focus on knee extension   Heel Slides x 3 mins  SLR x 15  Ball Squeezes x 3 mins  Bridges x 15  Clams x 15  S/L Hip Abduction x 15  Heel Raises x 20  Toe Taps x 3 mins  Step-ups x 20    DNP  Alessia received the following manual therapy techniques: Joint mobilizations and Soft tissue Mobilization were applied to the: right knee  for 12 minutes including:  STM to distal quad mm with progressive knee flexion; patellar mobilization in all planes;  J;oint mobilization into flexion and extension with end range holds.     The patient received the following direct contact modalities after being cleared for contraindications:     The patient received the following supervised modalities after being cleared for contradictions:     Written Home Exercises Provided:   Quad Sets;   Pt demo good understanding of the education provided. Alessia demonstrated good return demonstration of activities.     Education provided re:  Alessia verbalized good understanding of education provided.   No spiritual or educational  barriers to learning provided    Assessment     Patient tolerated treatment well; She is still having pain in her knee, ambulates with antalgic gait pattern; not pushing herself much at home.   This is a 64 y.o. female referred to outpatient physical therapy and presents with a medical diagnosis of s/p Right TKA  and demonstrates limitations as described in the problem list. Pt prognosis is Good. Pt will continue to benefit from skilled outpatient physical therapy to address the deficits listed in the problem list, provide pt/family education and to maximize pt's level of independence in the home and community environment.     Goals as follows:    Long Term Goals: 6 weeks  Pain: Decrease pain to no more than 1/10 to allow for improved ability to perform daily and recreational activities   Strength: Improve strength in right Hip to Knee to 5/5 for improved LE stability  ROM: Improve ROM to equal Her left knee - full extension and flexion to 125.    Functional scale: Improve score on LEFS  to < 35% limitation   Lifting: Lift 15 lbs to waist level, and carry for 25 feet without pain or compensation  Walking: Increase walking distance/duration to 1/4 mile without pain  Postures: Increase sitting and/or standing duration to 60 mins  without pain   Transfers: Perform sit <> stand transfers without increased pain or limitation from regular chair height; able to complete 9 in 30 secs.   Exercise: demonstrate independence with home exercise program to maintain gains made in therapy.          Plan     Continue with established Plan of Care towards PT goals.    Therapist: Jonathan Favre, PTA  2/20/2020

## 2020-02-27 ENCOUNTER — PATIENT OUTREACH (OUTPATIENT)
Dept: ADMINISTRATIVE | Facility: HOSPITAL | Age: 65
End: 2020-02-27

## 2020-02-27 NOTE — LETTER
February 27, 2020    Alessia Nelson  [address] [place] [address]             [facility]  [address]  [facility] [address]  Phone: [phone] Dear Nancy [facility] is committed to your overall health and would like to ensure that you are up to date on your recommended test and/or procedures.   Cheryl Bradley NP  has found that your chart shows you may be due for the following:    Health Maintenance Due   Topic Date Due    Hepatitis C Screening  1955    HIV Screening  10/05/1971    Mammogram  10/05/1996    Shingles Vaccine (1 of 2) 10/05/2006     If you have had any of the above done at another facility, please let us know so that we may obtain copies from that facility.  If you have a copy of these records, please provide a copy for us to scan into your chart.  You are welcome to request that the report be faxed to us at  ([phone]).     Otherwise, please schedule these appointments at your earliest convenience by calling [phone] or going to [email or web address].    If you have an upcoming scheduled appointment for the item above, please disregard this letter.    Sincerely,  Your [facility]  ANUM Hughes L.P.N. Clinical Care Coordinator  [address] [place] [address] [phone]

## 2020-03-02 ENCOUNTER — PATIENT OUTREACH (OUTPATIENT)
Dept: ADMINISTRATIVE | Facility: OTHER | Age: 65
End: 2020-03-02

## 2020-03-03 DIAGNOSIS — M53.3 SACROILIAC JOINT PAIN: ICD-10-CM

## 2020-03-03 DIAGNOSIS — M70.61 GREATER TROCHANTERIC BURSITIS OF RIGHT HIP: ICD-10-CM

## 2020-03-03 DIAGNOSIS — M96.1 POSTLAMINECTOMY SYNDROME OF LUMBAR REGION: ICD-10-CM

## 2020-03-03 DIAGNOSIS — G89.4 CHRONIC PAIN DISORDER: ICD-10-CM

## 2020-03-03 DIAGNOSIS — Z96.651 STATUS POST TOTAL RIGHT KNEE REPLACEMENT: ICD-10-CM

## 2020-03-03 RX ORDER — HYDROCODONE BITARTRATE AND ACETAMINOPHEN 7.5; 325 MG/1; MG/1
1 TABLET ORAL 2 TIMES DAILY PRN
Qty: 60 TABLET | Refills: 0 | Status: SHIPPED | OUTPATIENT
Start: 2020-03-03 | End: 2020-04-14 | Stop reason: SDUPTHER

## 2020-03-03 NOTE — TELEPHONE ENCOUNTER
----- Message from Juan Carlos Mora sent at 3/3/2020  8:30 AM CST -----  Contact: pt   Type: Needs Medical Advice    Who Called:  Pt   Symptoms (please be specific):    How long has patient had these symptoms:    Pharmacy name and phone #:    Best Call Back Number:    Additional Information: pt cancelled appt 03/03/2020 and I offered to reschedule but she doesn't know when would be a good day to come in

## 2020-03-03 NOTE — TELEPHONE ENCOUNTER
Pt stated she cancelled her appt today R/T severe dehydration. Pt stated she plans to head to the ED today. She says she only has enough Norco to last through Sunday. Pt appt rescheduled to 3/17. Pt requesting refill of [place].

## 2020-03-04 ENCOUNTER — HOSPITAL ENCOUNTER (INPATIENT)
Facility: HOSPITAL | Age: 65
LOS: 8 days | Discharge: HOME HEALTH | DRG: 371 | End: 2020-03-12
Attending: EMERGENCY MEDICINE | Admitting: INTERNAL MEDICINE
Payer: MEDICARE

## 2020-03-04 DIAGNOSIS — K52.9 COLITIS: ICD-10-CM

## 2020-03-04 DIAGNOSIS — R07.9 CHEST PAIN: ICD-10-CM

## 2020-03-04 DIAGNOSIS — A04.72 C. DIFFICILE COLITIS: ICD-10-CM

## 2020-03-04 DIAGNOSIS — N17.9 ACUTE KIDNEY INJURY: Primary | ICD-10-CM

## 2020-03-04 LAB
ALBUMIN SERPL BCP-MCNC: 3.7 G/DL (ref 3.5–5.2)
ALP SERPL-CCNC: 146 U/L (ref 55–135)
ALT SERPL W/O P-5'-P-CCNC: 29 U/L (ref 10–44)
ANION GAP SERPL CALC-SCNC: 10 MMOL/L (ref 8–16)
AST SERPL-CCNC: 41 U/L (ref 10–40)
BACTERIA #/AREA URNS HPF: ABNORMAL /HPF
BASOPHILS # BLD AUTO: 0.04 K/UL (ref 0–0.2)
BASOPHILS NFR BLD: 0.4 % (ref 0–1.9)
BILIRUB SERPL-MCNC: 0.3 MG/DL (ref 0.1–1)
BILIRUB UR QL STRIP: NEGATIVE
BUN SERPL-MCNC: 18 MG/DL (ref 8–23)
CALCIUM SERPL-MCNC: 9.9 MG/DL (ref 8.7–10.5)
CHLORIDE SERPL-SCNC: 109 MMOL/L (ref 95–110)
CLARITY UR: CLEAR
CO2 SERPL-SCNC: 19 MMOL/L (ref 23–29)
COLOR UR: YELLOW
CREAT SERPL-MCNC: 1.8 MG/DL (ref 0.5–1.4)
DIFFERENTIAL METHOD: ABNORMAL
EOSINOPHIL # BLD AUTO: 0.1 K/UL (ref 0–0.5)
EOSINOPHIL NFR BLD: 1.1 % (ref 0–8)
ERYTHROCYTE [DISTWIDTH] IN BLOOD BY AUTOMATED COUNT: 15.5 % (ref 11.5–14.5)
EST. GFR  (AFRICAN AMERICAN): 34 ML/MIN/1.73 M^2
EST. GFR  (NON AFRICAN AMERICAN): 30 ML/MIN/1.73 M^2
GLUCOSE SERPL-MCNC: 104 MG/DL (ref 70–110)
GLUCOSE UR QL STRIP: NEGATIVE
HCT VFR BLD AUTO: 36.3 % (ref 37–48.5)
HGB BLD-MCNC: 11.3 G/DL (ref 12–16)
HGB UR QL STRIP: NEGATIVE
HYALINE CASTS #/AREA URNS LPF: 8 /LPF
IMM GRANULOCYTES # BLD AUTO: 0.04 K/UL (ref 0–0.04)
KETONES UR QL STRIP: NEGATIVE
LEUKOCYTE ESTERASE UR QL STRIP: ABNORMAL
LIPASE SERPL-CCNC: 3 U/L (ref 4–60)
LYMPHOCYTES # BLD AUTO: 2.1 K/UL (ref 1–4.8)
LYMPHOCYTES NFR BLD: 19 % (ref 18–48)
MCH RBC QN AUTO: 29.9 PG (ref 27–31)
MCHC RBC AUTO-ENTMCNC: 31.1 G/DL (ref 32–36)
MCV RBC AUTO: 96 FL (ref 82–98)
MICROSCOPIC COMMENT: ABNORMAL
MONOCYTES # BLD AUTO: 1 K/UL (ref 0.3–1)
MONOCYTES NFR BLD: 9 % (ref 4–15)
NEUTROPHILS # BLD AUTO: 7.9 K/UL (ref 1.8–7.7)
NEUTROPHILS NFR BLD: 70.1 % (ref 38–73)
NITRITE UR QL STRIP: NEGATIVE
NRBC BLD-RTO: 0 /100 WBC
PH UR STRIP: 6 [PH] (ref 5–8)
PLATELET # BLD AUTO: 218 K/UL (ref 150–350)
PMV BLD AUTO: 9.5 FL (ref 9.2–12.9)
POTASSIUM SERPL-SCNC: 4.9 MMOL/L (ref 3.5–5.1)
PROT SERPL-MCNC: 6.6 G/DL (ref 6–8.4)
PROT UR QL STRIP: NEGATIVE
RBC # BLD AUTO: 3.78 M/UL (ref 4–5.4)
RBC #/AREA URNS HPF: 0 /HPF (ref 0–4)
SODIUM SERPL-SCNC: 138 MMOL/L (ref 136–145)
SP GR UR STRIP: >=1.03 (ref 1–1.03)
SQUAMOUS #/AREA URNS HPF: 2 /HPF
URN SPEC COLLECT METH UR: ABNORMAL
UROBILINOGEN UR STRIP-ACNC: NEGATIVE EU/DL
WBC # BLD AUTO: 11.17 K/UL (ref 3.9–12.7)
WBC #/AREA URNS HPF: 7 /HPF (ref 0–5)

## 2020-03-04 PROCEDURE — 96361 HYDRATE IV INFUSION ADD-ON: CPT

## 2020-03-04 PROCEDURE — 99285 EMERGENCY DEPT VISIT HI MDM: CPT | Mod: 25

## 2020-03-04 PROCEDURE — 81000 URINALYSIS NONAUTO W/SCOPE: CPT

## 2020-03-04 PROCEDURE — 36415 COLL VENOUS BLD VENIPUNCTURE: CPT

## 2020-03-04 PROCEDURE — 96375 TX/PRO/DX INJ NEW DRUG ADDON: CPT

## 2020-03-04 PROCEDURE — 83690 ASSAY OF LIPASE: CPT

## 2020-03-04 PROCEDURE — 85025 COMPLETE CBC W/AUTO DIFF WBC: CPT

## 2020-03-04 PROCEDURE — 12000002 HC ACUTE/MED SURGE SEMI-PRIVATE ROOM

## 2020-03-04 PROCEDURE — 63600175 PHARM REV CODE 636 W HCPCS: Performed by: PHYSICIAN ASSISTANT

## 2020-03-04 PROCEDURE — G0378 HOSPITAL OBSERVATION PER HR: HCPCS

## 2020-03-04 PROCEDURE — 80053 COMPREHEN METABOLIC PANEL: CPT

## 2020-03-04 PROCEDURE — 96374 THER/PROPH/DIAG INJ IV PUSH: CPT

## 2020-03-04 RX ORDER — MORPHINE SULFATE 4 MG/ML
4 INJECTION, SOLUTION INTRAMUSCULAR; INTRAVENOUS
Status: COMPLETED | OUTPATIENT
Start: 2020-03-04 | End: 2020-03-04

## 2020-03-04 RX ORDER — ONDANSETRON 2 MG/ML
4 INJECTION INTRAMUSCULAR; INTRAVENOUS
Status: COMPLETED | OUTPATIENT
Start: 2020-03-04 | End: 2020-03-04

## 2020-03-04 RX ADMIN — ONDANSETRON 4 MG: 2 INJECTION INTRAMUSCULAR; INTRAVENOUS at 08:03

## 2020-03-04 RX ADMIN — MORPHINE SULFATE 4 MG: 4 INJECTION, SOLUTION INTRAMUSCULAR; INTRAVENOUS at 08:03

## 2020-03-04 RX ADMIN — SODIUM CHLORIDE 1000 ML: 0.9 INJECTION, SOLUTION INTRAVENOUS at 10:03

## 2020-03-04 RX ADMIN — SODIUM CHLORIDE 1000 ML: 0.9 INJECTION, SOLUTION INTRAVENOUS at 08:03

## 2020-03-05 PROBLEM — K52.9 ENTEROCOLITIS: Status: ACTIVE | Noted: 2020-03-05

## 2020-03-05 PROBLEM — D64.9 NORMOCYTIC ANEMIA: Status: ACTIVE | Noted: 2020-03-05

## 2020-03-05 PROBLEM — E83.42 HYPOMAGNESEMIA: Status: ACTIVE | Noted: 2020-03-05

## 2020-03-05 LAB
ALBUMIN SERPL BCP-MCNC: 2.8 G/DL (ref 3.5–5.2)
ALP SERPL-CCNC: 111 U/L (ref 55–135)
ALT SERPL W/O P-5'-P-CCNC: 25 U/L (ref 10–44)
ANION GAP SERPL CALC-SCNC: 6 MMOL/L (ref 8–16)
AST SERPL-CCNC: 32 U/L (ref 10–40)
BASOPHILS # BLD AUTO: 0.04 K/UL (ref 0–0.2)
BASOPHILS NFR BLD: 0.6 % (ref 0–1.9)
BILIRUB SERPL-MCNC: 0.2 MG/DL (ref 0.1–1)
BUN SERPL-MCNC: 15 MG/DL (ref 8–23)
C DIFF GDH STL QL: POSITIVE
C DIFF TOX A+B STL QL IA: NEGATIVE
C DIFF TOX GENS STL QL NAA+PROBE: POSITIVE
CALCIUM SERPL-MCNC: 8.6 MG/DL (ref 8.7–10.5)
CHLORIDE SERPL-SCNC: 115 MMOL/L (ref 95–110)
CO2 SERPL-SCNC: 20 MMOL/L (ref 23–29)
CREAT SERPL-MCNC: 1.2 MG/DL (ref 0.5–1.4)
DIFFERENTIAL METHOD: ABNORMAL
EOSINOPHIL # BLD AUTO: 0.1 K/UL (ref 0–0.5)
EOSINOPHIL NFR BLD: 1.3 % (ref 0–8)
ERYTHROCYTE [DISTWIDTH] IN BLOOD BY AUTOMATED COUNT: 15.3 % (ref 11.5–14.5)
EST. GFR  (AFRICAN AMERICAN): 56 ML/MIN/1.73 M^2
EST. GFR  (NON AFRICAN AMERICAN): 48 ML/MIN/1.73 M^2
GLUCOSE SERPL-MCNC: 75 MG/DL (ref 70–110)
HCT VFR BLD AUTO: 30.2 % (ref 37–48.5)
HGB BLD-MCNC: 9.2 G/DL (ref 12–16)
IMM GRANULOCYTES # BLD AUTO: 0.03 K/UL (ref 0–0.04)
LYMPHOCYTES # BLD AUTO: 2.3 K/UL (ref 1–4.8)
LYMPHOCYTES NFR BLD: 31.8 % (ref 18–48)
MAGNESIUM SERPL-MCNC: 1.4 MG/DL (ref 1.6–2.6)
MAGNESIUM SERPL-MCNC: <0.7 MG/DL (ref 1.6–2.6)
MCH RBC QN AUTO: 29.3 PG (ref 27–31)
MCHC RBC AUTO-ENTMCNC: 30.5 G/DL (ref 32–36)
MCV RBC AUTO: 96 FL (ref 82–98)
MONOCYTES # BLD AUTO: 0.8 K/UL (ref 0.3–1)
MONOCYTES NFR BLD: 10.6 % (ref 4–15)
NEUTROPHILS # BLD AUTO: 4 K/UL (ref 1.8–7.7)
NEUTROPHILS NFR BLD: 55.3 % (ref 38–73)
NRBC BLD-RTO: 0 /100 WBC
PLATELET # BLD AUTO: 167 K/UL (ref 150–350)
PMV BLD AUTO: 9.7 FL (ref 9.2–12.9)
POTASSIUM SERPL-SCNC: 4.2 MMOL/L (ref 3.5–5.1)
PROT SERPL-MCNC: 5.1 G/DL (ref 6–8.4)
RBC # BLD AUTO: 3.14 M/UL (ref 4–5.4)
SODIUM SERPL-SCNC: 141 MMOL/L (ref 136–145)
T4 FREE SERPL-MCNC: 0.95 NG/DL (ref 0.71–1.51)
WBC # BLD AUTO: 7.16 K/UL (ref 3.9–12.7)
WBC #/AREA STL HPF: NORMAL /[HPF]

## 2020-03-05 PROCEDURE — 96376 TX/PRO/DX INJ SAME DRUG ADON: CPT

## 2020-03-05 PROCEDURE — 87449 NOS EACH ORGANISM AG IA: CPT

## 2020-03-05 PROCEDURE — 87046 STOOL CULTR AEROBIC BACT EA: CPT | Mod: 59

## 2020-03-05 PROCEDURE — 83735 ASSAY OF MAGNESIUM: CPT | Mod: 91

## 2020-03-05 PROCEDURE — 36415 COLL VENOUS BLD VENIPUNCTURE: CPT

## 2020-03-05 PROCEDURE — 84439 ASSAY OF FREE THYROXINE: CPT

## 2020-03-05 PROCEDURE — 25000003 PHARM REV CODE 250: Performed by: NURSE PRACTITIONER

## 2020-03-05 PROCEDURE — 89055 LEUKOCYTE ASSESSMENT FECAL: CPT

## 2020-03-05 PROCEDURE — 96361 HYDRATE IV INFUSION ADD-ON: CPT

## 2020-03-05 PROCEDURE — 87427 SHIGA-LIKE TOXIN AG IA: CPT

## 2020-03-05 PROCEDURE — S0030 INJECTION, METRONIDAZOLE: HCPCS | Performed by: HOSPITALIST

## 2020-03-05 PROCEDURE — 80053 COMPREHEN METABOLIC PANEL: CPT

## 2020-03-05 PROCEDURE — G0378 HOSPITAL OBSERVATION PER HR: HCPCS

## 2020-03-05 PROCEDURE — 96375 TX/PRO/DX INJ NEW DRUG ADDON: CPT

## 2020-03-05 PROCEDURE — 87493 C DIFF AMPLIFIED PROBE: CPT

## 2020-03-05 PROCEDURE — 83735 ASSAY OF MAGNESIUM: CPT

## 2020-03-05 PROCEDURE — S4991 NICOTINE PATCH NONLEGEND: HCPCS | Performed by: NURSE PRACTITIONER

## 2020-03-05 PROCEDURE — 25000003 PHARM REV CODE 250: Performed by: HOSPITALIST

## 2020-03-05 PROCEDURE — 63600175 PHARM REV CODE 636 W HCPCS: Performed by: INTERNAL MEDICINE

## 2020-03-05 PROCEDURE — 87045 FECES CULTURE AEROBIC BACT: CPT

## 2020-03-05 PROCEDURE — 12000002 HC ACUTE/MED SURGE SEMI-PRIVATE ROOM

## 2020-03-05 PROCEDURE — 99900035 HC TECH TIME PER 15 MIN (STAT)

## 2020-03-05 PROCEDURE — 63600175 PHARM REV CODE 636 W HCPCS: Performed by: HOSPITALIST

## 2020-03-05 PROCEDURE — 63600175 PHARM REV CODE 636 W HCPCS: Performed by: NURSE PRACTITIONER

## 2020-03-05 PROCEDURE — 87209 SMEAR COMPLEX STAIN: CPT

## 2020-03-05 PROCEDURE — 85025 COMPLETE CBC W/AUTO DIFF WBC: CPT

## 2020-03-05 PROCEDURE — C9113 INJ PANTOPRAZOLE SODIUM, VIA: HCPCS | Performed by: NURSE PRACTITIONER

## 2020-03-05 PROCEDURE — 87324 CLOSTRIDIUM AG IA: CPT

## 2020-03-05 PROCEDURE — 94761 N-INVAS EAR/PLS OXIMETRY MLT: CPT

## 2020-03-05 RX ORDER — POTASSIUM CHLORIDE 20 MEQ/15ML
40 SOLUTION ORAL
Status: DISCONTINUED | OUTPATIENT
Start: 2020-03-05 | End: 2020-03-09

## 2020-03-05 RX ORDER — HYDROCODONE BITARTRATE AND ACETAMINOPHEN 7.5; 325 MG/1; MG/1
1 TABLET ORAL EVERY 6 HOURS PRN
Status: DISCONTINUED | OUTPATIENT
Start: 2020-03-05 | End: 2020-03-06

## 2020-03-05 RX ORDER — GLUCAGON 1 MG
1 KIT INJECTION
Status: DISCONTINUED | OUTPATIENT
Start: 2020-03-05 | End: 2020-03-12 | Stop reason: HOSPADM

## 2020-03-05 RX ORDER — BACLOFEN 10 MG/1
10 TABLET ORAL 2 TIMES DAILY
Status: DISCONTINUED | OUTPATIENT
Start: 2020-03-05 | End: 2020-03-12 | Stop reason: HOSPADM

## 2020-03-05 RX ORDER — TRAZODONE HYDROCHLORIDE 50 MG/1
100 TABLET ORAL NIGHTLY
Status: DISCONTINUED | OUTPATIENT
Start: 2020-03-05 | End: 2020-03-12 | Stop reason: HOSPADM

## 2020-03-05 RX ORDER — BENAZEPRIL HYDROCHLORIDE 10 MG/1
20 TABLET ORAL 2 TIMES DAILY
Status: DISCONTINUED | OUTPATIENT
Start: 2020-03-05 | End: 2020-03-12 | Stop reason: HOSPADM

## 2020-03-05 RX ORDER — METOPROLOL TARTRATE 50 MG/1
100 TABLET ORAL 2 TIMES DAILY
Status: DISCONTINUED | OUTPATIENT
Start: 2020-03-05 | End: 2020-03-10

## 2020-03-05 RX ORDER — HYDROCODONE BITARTRATE AND ACETAMINOPHEN 7.5; 325 MG/1; MG/1
1 TABLET ORAL EVERY 6 HOURS PRN
Status: DISCONTINUED | OUTPATIENT
Start: 2020-03-05 | End: 2020-03-08

## 2020-03-05 RX ORDER — LANOLIN ALCOHOL/MO/W.PET/CERES
800 CREAM (GRAM) TOPICAL
Status: DISCONTINUED | OUTPATIENT
Start: 2020-03-05 | End: 2020-03-06

## 2020-03-05 RX ORDER — CLOPIDOGREL BISULFATE 75 MG/1
75 TABLET ORAL DAILY
Status: DISCONTINUED | OUTPATIENT
Start: 2020-03-05 | End: 2020-03-12 | Stop reason: HOSPADM

## 2020-03-05 RX ORDER — ONDANSETRON 2 MG/ML
8 INJECTION INTRAMUSCULAR; INTRAVENOUS EVERY 8 HOURS PRN
Status: DISCONTINUED | OUTPATIENT
Start: 2020-03-05 | End: 2020-03-10

## 2020-03-05 RX ORDER — ACETAMINOPHEN 325 MG/1
650 TABLET ORAL EVERY 4 HOURS PRN
Status: DISCONTINUED | OUTPATIENT
Start: 2020-03-05 | End: 2020-03-12 | Stop reason: HOSPADM

## 2020-03-05 RX ORDER — ROSUVASTATIN CALCIUM 20 MG/1
20 TABLET, COATED ORAL DAILY
Status: DISCONTINUED | OUTPATIENT
Start: 2020-03-05 | End: 2020-03-12 | Stop reason: HOSPADM

## 2020-03-05 RX ORDER — IBUPROFEN 200 MG
16 TABLET ORAL
Status: DISCONTINUED | OUTPATIENT
Start: 2020-03-05 | End: 2020-03-12 | Stop reason: HOSPADM

## 2020-03-05 RX ORDER — SODIUM CHLORIDE 0.9 % (FLUSH) 0.9 %
10 SYRINGE (ML) INJECTION
Status: DISCONTINUED | OUTPATIENT
Start: 2020-03-05 | End: 2020-03-12 | Stop reason: HOSPADM

## 2020-03-05 RX ORDER — PANTOPRAZOLE SODIUM 40 MG/1
40 TABLET, DELAYED RELEASE ORAL DAILY
Status: DISCONTINUED | OUTPATIENT
Start: 2020-03-05 | End: 2020-03-05

## 2020-03-05 RX ORDER — IPRATROPIUM BROMIDE AND ALBUTEROL SULFATE 2.5; .5 MG/3ML; MG/3ML
3 SOLUTION RESPIRATORY (INHALATION) EVERY 6 HOURS PRN
Status: DISCONTINUED | OUTPATIENT
Start: 2020-03-05 | End: 2020-03-12 | Stop reason: HOSPADM

## 2020-03-05 RX ORDER — BUPROPION HYDROCHLORIDE 150 MG/1
150 TABLET ORAL NIGHTLY
Status: DISCONTINUED | OUTPATIENT
Start: 2020-03-05 | End: 2020-03-12 | Stop reason: HOSPADM

## 2020-03-05 RX ORDER — SODIUM CHLORIDE 9 MG/ML
INJECTION, SOLUTION INTRAVENOUS CONTINUOUS
Status: DISCONTINUED | OUTPATIENT
Start: 2020-03-05 | End: 2020-03-10

## 2020-03-05 RX ORDER — ALLOPURINOL 100 MG/1
300 TABLET ORAL DAILY
Status: DISCONTINUED | OUTPATIENT
Start: 2020-03-05 | End: 2020-03-12 | Stop reason: HOSPADM

## 2020-03-05 RX ORDER — METRONIDAZOLE 500 MG/100ML
500 INJECTION, SOLUTION INTRAVENOUS
Status: DISCONTINUED | OUTPATIENT
Start: 2020-03-05 | End: 2020-03-05

## 2020-03-05 RX ORDER — OXYBUTYNIN CHLORIDE 5 MG/1
10 TABLET, EXTENDED RELEASE ORAL DAILY
Status: DISCONTINUED | OUTPATIENT
Start: 2020-03-05 | End: 2020-03-12 | Stop reason: HOSPADM

## 2020-03-05 RX ORDER — ONDANSETRON 2 MG/ML
4 INJECTION INTRAMUSCULAR; INTRAVENOUS EVERY 6 HOURS PRN
Status: DISCONTINUED | OUTPATIENT
Start: 2020-03-05 | End: 2020-03-12 | Stop reason: HOSPADM

## 2020-03-05 RX ORDER — IBUPROFEN 200 MG
24 TABLET ORAL
Status: DISCONTINUED | OUTPATIENT
Start: 2020-03-05 | End: 2020-03-12 | Stop reason: HOSPADM

## 2020-03-05 RX ORDER — ESCITALOPRAM OXALATE 10 MG/1
20 TABLET ORAL NIGHTLY
Status: DISCONTINUED | OUTPATIENT
Start: 2020-03-05 | End: 2020-03-12 | Stop reason: HOSPADM

## 2020-03-05 RX ORDER — MAGNESIUM SULFATE 1 G/100ML
1 INJECTION INTRAVENOUS
Status: COMPLETED | OUTPATIENT
Start: 2020-03-05 | End: 2020-03-05

## 2020-03-05 RX ORDER — MORPHINE SULFATE 2 MG/ML
2 INJECTION, SOLUTION INTRAMUSCULAR; INTRAVENOUS ONCE
Status: COMPLETED | OUTPATIENT
Start: 2020-03-06 | End: 2020-03-05

## 2020-03-05 RX ORDER — CIPROFLOXACIN 2 MG/ML
400 INJECTION, SOLUTION INTRAVENOUS
Status: DISCONTINUED | OUTPATIENT
Start: 2020-03-05 | End: 2020-03-05

## 2020-03-05 RX ORDER — GABAPENTIN 400 MG/1
400 CAPSULE ORAL 2 TIMES DAILY
Status: DISCONTINUED | OUTPATIENT
Start: 2020-03-05 | End: 2020-03-12 | Stop reason: HOSPADM

## 2020-03-05 RX ORDER — IBUPROFEN 200 MG
1 TABLET ORAL DAILY
Status: DISCONTINUED | OUTPATIENT
Start: 2020-03-05 | End: 2020-03-12 | Stop reason: HOSPADM

## 2020-03-05 RX ORDER — PANTOPRAZOLE SODIUM 40 MG/10ML
40 INJECTION, POWDER, LYOPHILIZED, FOR SOLUTION INTRAVENOUS DAILY
Status: DISCONTINUED | OUTPATIENT
Start: 2020-03-05 | End: 2020-03-09

## 2020-03-05 RX ADMIN — ONDANSETRON 8 MG: 2 INJECTION INTRAMUSCULAR; INTRAVENOUS at 09:03

## 2020-03-05 RX ADMIN — CLOPIDOGREL BISULFATE 75 MG: 75 TABLET ORAL at 09:03

## 2020-03-05 RX ADMIN — METRONIDAZOLE 500 MG: 500 INJECTION, SOLUTION INTRAVENOUS at 07:03

## 2020-03-05 RX ADMIN — HYDROCODONE BITARTRATE AND ACETAMINOPHEN 1 TABLET: 7.5; 325 TABLET ORAL at 08:03

## 2020-03-05 RX ADMIN — BENAZEPRIL HYDROCHLORIDE 20 MG: 10 TABLET, COATED ORAL at 09:03

## 2020-03-05 RX ADMIN — OXYBUTYNIN CHLORIDE 10 MG: 5 TABLET, EXTENDED RELEASE ORAL at 09:03

## 2020-03-05 RX ADMIN — METOPROLOL TARTRATE 100 MG: 50 TABLET, FILM COATED ORAL at 09:03

## 2020-03-05 RX ADMIN — Medication 800 MG: at 07:03

## 2020-03-05 RX ADMIN — SODIUM CHLORIDE: 0.9 INJECTION, SOLUTION INTRAVENOUS at 10:03

## 2020-03-05 RX ADMIN — MAGNESIUM SULFATE HEPTAHYDRATE 1 G: 1 INJECTION, SOLUTION INTRAVENOUS at 09:03

## 2020-03-05 RX ADMIN — ONDANSETRON HYDROCHLORIDE 4 MG: 2 SOLUTION INTRAMUSCULAR; INTRAVENOUS at 03:03

## 2020-03-05 RX ADMIN — MAGNESIUM SULFATE HEPTAHYDRATE 1 G: 1 INJECTION, SOLUTION INTRAVENOUS at 03:03

## 2020-03-05 RX ADMIN — ROSUVASTATIN CALCIUM 20 MG: 20 TABLET, FILM COATED ORAL at 09:03

## 2020-03-05 RX ADMIN — ONDANSETRON 8 MG: 2 INJECTION INTRAMUSCULAR; INTRAVENOUS at 08:03

## 2020-03-05 RX ADMIN — Medication 500 MG: at 11:03

## 2020-03-05 RX ADMIN — GABAPENTIN 400 MG: 400 CAPSULE ORAL at 09:03

## 2020-03-05 RX ADMIN — SODIUM CHLORIDE: 0.9 INJECTION, SOLUTION INTRAVENOUS at 07:03

## 2020-03-05 RX ADMIN — MAGNESIUM SULFATE HEPTAHYDRATE 1 G: 1 INJECTION, SOLUTION INTRAVENOUS at 11:03

## 2020-03-05 RX ADMIN — NICOTINE 1 PATCH: 14 PATCH, EXTENDED RELEASE TRANSDERMAL at 09:03

## 2020-03-05 RX ADMIN — ESCITALOPRAM OXALATE 20 MG: 10 TABLET, FILM COATED ORAL at 09:03

## 2020-03-05 RX ADMIN — ALLOPURINOL 300 MG: 100 TABLET ORAL at 09:03

## 2020-03-05 RX ADMIN — BACLOFEN 10 MG: 10 TABLET ORAL at 09:03

## 2020-03-05 RX ADMIN — HYDROCODONE BITARTRATE AND ACETAMINOPHEN 1 TABLET: 7.5; 325 TABLET ORAL at 09:03

## 2020-03-05 RX ADMIN — LEVOTHYROXINE SODIUM 75 MCG: 50 TABLET ORAL at 06:03

## 2020-03-05 RX ADMIN — MORPHINE SULFATE 2 MG: 2 INJECTION, SOLUTION INTRAMUSCULAR; INTRAVENOUS at 11:03

## 2020-03-05 RX ADMIN — PANTOPRAZOLE SODIUM 40 MG: 40 INJECTION, POWDER, LYOPHILIZED, FOR SOLUTION INTRAVENOUS at 09:03

## 2020-03-05 RX ADMIN — METRONIDAZOLE 500 MG: 500 INJECTION, SOLUTION INTRAVENOUS at 10:03

## 2020-03-05 RX ADMIN — TRAZODONE HYDROCHLORIDE 100 MG: 50 TABLET ORAL at 09:03

## 2020-03-05 RX ADMIN — HYDROCODONE BITARTRATE AND ACETAMINOPHEN 1 TABLET: 7.5; 325 TABLET ORAL at 03:03

## 2020-03-05 RX ADMIN — BUPROPION HYDROCHLORIDE 150 MG: 150 TABLET, EXTENDED RELEASE ORAL at 09:03

## 2020-03-05 RX ADMIN — BUPROPION HYDROCHLORIDE 150 MG: 150 TABLET, EXTENDED RELEASE ORAL at 02:03

## 2020-03-05 RX ADMIN — CIPROFLOXACIN 400 MG: 2 INJECTION, SOLUTION INTRAVENOUS at 10:03

## 2020-03-05 RX ADMIN — SODIUM CHLORIDE: 0.9 INJECTION, SOLUTION INTRAVENOUS at 02:03

## 2020-03-05 NOTE — ASSESSMENT & PLAN NOTE
Likely pre-renal etiology as patient reports diarrhea x1 week.  Patient was given 2 L IV normal saline in emergency room, will continue IV fluid hydration upon admission to hospital.  Trend renal function with daily CMP.  Avoid nephrotoxic agents, renally dose medications.  Strict intake and output.  Hold home hydrochlorothiazide.

## 2020-03-05 NOTE — SUBJECTIVE & OBJECTIVE
"Interval History:  Patient seen and examined.  Continues to have diarrhea.  Stool appears "seedy" and is not foul-smelling per nurse.  Antigen positive, C diff toxin negative.  PCR pending.  Patient complaining of generalized abdominal pain which is worse on the right side.  Also complaining of right knee pain, had total knee arthroplasty with Dr. Briceño a few months ago.    Review of Systems   Constitutional: Positive for activity change, appetite change and fatigue. Negative for chills and fever.   HENT: Negative for congestion, sinus pressure, sinus pain and sore throat.    Eyes: Negative for photophobia and visual disturbance.   Respiratory: Negative for cough, choking, chest tightness, shortness of breath and wheezing.    Cardiovascular: Positive for leg swelling. Negative for chest pain and palpitations.   Gastrointestinal: Positive for abdominal distention, abdominal pain, diarrhea and nausea. Negative for blood in stool, constipation and vomiting.   Genitourinary: Negative for difficulty urinating, dysuria, flank pain and hematuria.        Dark color urine   Musculoskeletal: Positive for back pain, gait problem, joint swelling and myalgias.   Skin: Negative for rash and wound.   Neurological: Negative for dizziness, syncope, weakness, light-headedness, numbness and headaches.   Psychiatric/Behavioral: Negative for confusion. The patient is not nervous/anxious.      Objective:     Vital Signs (Most Recent):  Temp: 96.7 °F (35.9 °C) (03/05/20 1144)  Pulse: 61 (03/05/20 1144)  Resp: 18 (03/05/20 1144)  BP: (!) 113/54 (03/05/20 1144)  SpO2: 98 % (03/05/20 1144) Vital Signs (24h Range):  Temp:  [96.7 °F (35.9 °C)-98.2 °F (36.8 °C)] 96.7 °F (35.9 °C)  Pulse:  [61-74] 61  Resp:  [14-18] 18  SpO2:  [94 %-100 %] 98 %  BP: ()/(42-70) 113/54     Weight: 85 kg (187 lb 6.4 oz)(bed scale)  Body mass index is 29.35 kg/m².    Intake/Output Summary (Last 24 hours) at 3/5/2020 1313  Last data filed at 3/5/2020 " 0630  Gross per 24 hour   Intake 2570 ml   Output --   Net 2570 ml      Physical Exam   Constitutional: She is oriented to person, place, and time. She appears well-developed and well-nourished. No distress.   Chronically ill-appearing female   HENT:   Head: Normocephalic and atraumatic.   Eyes: Pupils are equal, round, and reactive to light. EOM are normal. Right eye exhibits no discharge. Left eye exhibits no discharge.   Neck: Normal range of motion. No JVD present.   Cardiovascular: Normal rate, regular rhythm, normal heart sounds and intact distal pulses.   No murmur heard.  Pulmonary/Chest: Effort normal and breath sounds normal. No respiratory distress. She has no wheezes. She has no rales. She exhibits no tenderness.   On room air   Abdominal: Soft. Bowel sounds are normal. She exhibits no distension. There is no tenderness. There is no rebound and no guarding.   Right lateral abdominal tenderness noted upon palpation   Genitourinary:   Genitourinary Comments: Exam Deferred      Musculoskeletal: Normal range of motion. She exhibits edema. She exhibits no tenderness or deformity.   Mild right knee swelling pain on lateral aspect of patella   Neurological: She is alert and oriented to person, place, and time. No cranial nerve deficit or sensory deficit.   Skin: Skin is warm and dry. Capillary refill takes 2 to 3 seconds. No rash noted. She is not diaphoretic. No erythema.   Psychiatric: She has a normal mood and affect. Her behavior is normal. Judgment and thought content normal.   Nursing note and vitals reviewed.      Significant Labs: All pertinent labs within the past 24 hours have been reviewed.    Significant Imaging: I have reviewed and interpreted all pertinent imaging results/findings within the past 24 hours.

## 2020-03-05 NOTE — NURSING
Report received from Mehnaz (ER), Patient received from ER, VSS, special contact precautions order, no private rooms on floor, called and spoke with house supervisor (Liz), patient being sent to [facility] Rm 322, called [facility], report given to charge nurse (Carli), patient waiting on room to be cleaned before being transferred to floor.

## 2020-03-05 NOTE — ASSESSMENT & PLAN NOTE
Assistance with smoking cessation was offered, including:  [x]  Medications  []  Counseling  []  Printed Information on Smoking Cessation  []  Referral to a Smoking Cessation Program    Patient was counseled regarding smoking for >10 minutes.

## 2020-03-05 NOTE — HPI
Alessia Nelson is a 64-year-old female with a past medical history of hypertension, hypothyroidism, hyperlipidemia, GERD, insomnia, depression, coronary artery disease, and a TIA who presents to the emergency room tonight with abdominal pain.  Patient states that collectively she has been experiencing abdominal symptoms for the past month including nausea, diarrhea, and abdominal pain. 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  Patient states that last week she began to experience diarrhea, states that it has since slowed down.  Patient endorses right upper abdominal pain, that is worse with eating.  Patient states the pain is similar to her episodes of pancreatitis in the past.  Patient endorses decreased p.o. intake for the past 2 weeks.  Patient does endorse nausea, denies vomiting, states that she has been taking Zofran at home.  Patient endorses that her last bowel movement was yesterday, increased flatulence.  Patient also endorses that her urine looks like tea.  Patient states her last colonoscopy was in 2014, patient does endorse that she was diagnosed with gastric ulcers approximately 20 years ago.  Patient denies rectal bleeding, fever, alcohol use, new medication, or recent travel.  Patient also states that she recently underwent a right knee replacement in December of 2019 per Dr. Briceño.  Patient endorses right knee swelling and pain with movement and physical therapy.  Patient states today the pain became intolerable to the posterior aspect of her right knee after she was sweeping and mopping her home.  Patient states I could barely walk.  In the emergency room initial workup revealed acute kidney injury with possible enterocolitis on the CT of abdomen and pelvis.  Patient was given 2 L IV normal saline in the emergency room.  Patient placed in observation on 03/04/2020 at approximately 10:30 p.m..  Patient not accompanied by any visitors during my  initial interview or physical exam.  Patient states that she resides at home by herself, denies use of ambulatory assist devices or supplemental oxygen.

## 2020-03-05 NOTE — ASSESSMENT & PLAN NOTE
Continue IV fluid hydration.  Stool studies, review of past medical records reveals history of C diff infection in 2013.  Symptomatic management.  Cipro and Flagyl for now.  C diff antigen positive but toxin negative, PCR in process.  If PCR is positive will change medications to p.o. vancomycin.

## 2020-03-05 NOTE — ASSESSMENT & PLAN NOTE
Monitor and transfuse if patient becomes hemodynamically unstable or H/H < 7/21  Daily CBC.  No evidence of active or acute bleeding noted on my initial interview or physical exam.

## 2020-03-05 NOTE — ASSESSMENT & PLAN NOTE
Chronic, controlled. Will continue home medication.    Lab Results   Component Value Date    CHOL 89 (L) 07/24/2019     Lab Results   Component Value Date    HDL 40 07/24/2019     Lab Results   Component Value Date    LDLCALC 8.0 (L) 07/24/2019     Lab Results   Component Value Date    TRIG 205 (H) 07/24/2019     Lab Results   Component Value Date    CHOLHDL 44.9 07/24/2019

## 2020-03-05 NOTE — SUBJECTIVE & OBJECTIVE
Past Medical History:   Diagnosis Date    Acute pancreatitis     CAD (coronary artery disease)     CHF (congestive heart failure)     COPD (chronic obstructive pulmonary disease)     Depression     Diverticulosis     Encounter for blood transfusion     GERD (gastroesophageal reflux disease)     History of blood clots     1986 AFTER BOWEL RESCETION    History of bowel resection     Hypertension     Peritonitis     Seizures     HAD A SEIZURE FROM PHENERGAN     Thyroid disease     TIA (transient ischemic attack)        Past Surgical History:   Procedure Laterality Date    ADRENAL GLAND SURGERY      APPENDECTOMY      BACK SURGERY      CHOLECYSTECTOMY      COLONOSCOPY      CORONARY STENT PLACEMENT      CYSTOURETHROSCOPY N/A 11/13/2019    Procedure: CYSTOURETHROSCOPY;  Surgeon: Shun Nuñez MD;  Location: [facility] OR;  Service: Urology;  Laterality: N/A;    ENDOSCOPIC ULTRASOUND OF UPPER GASTROINTESTINAL TRACT N/A 11/25/2019    Procedure: ULTRASOUND, UPPER GI TRACT, ENDOSCOPIC;  Surgeon: Alhaji Bridges MD;  [facility]  Service: Endoscopy;  Laterality: N/A;  5 day hold Plavix, Dr Jovanni Serrano - pg  PM prep    HERNIA REPAIR      HYSTERECTOMY      INCISIONAL HERNIA REPAIR      INJECTION OF ANESTHETIC AGENT AROUND NERVE Right 5/27/2019    Procedure: RIGHT L5-S3 MEDIAL BRANCH BLOCKS;  Surgeon: Sneha Aragon MD;  Location: [facility] OR;  Service: Pain Management;  Laterality: Right;  **DO NOT STOP PLAVIX**    instestine      KNEE ARTHROPLASTY Right 12/18/2019    Procedure: ARTHROPLASTY, KNEE;  Surgeon: Ike Briceño II, MD;  Location: [facility] [place] OR;  Service: Orthopedics;  Laterality: Right;    KNEE SURGERY  1983    PARATHYROID GLAND SURGERY      3 surgeries    RADIOFREQUENCY ABLATION Right 6/10/2019    Procedure: Radiofrequency Ablation - RIGHT L3-5 RADIOFREQUENCY ABLATION WITH HALYARD COOLIEF THERMAL SYSTEM;  Surgeon: Sneha Aragon MD;  Location: [facility] OR;  Service: Pain  "Management;  Laterality: Right;  **HOLD PLAVIX x 7 DAYS PRIOR**    UPPER GASTROINTESTINAL ENDOSCOPY         Review of patient's allergies indicates:   Allergen Reactions    Aspirin      "Makes stomach feel like it's on fire"    Lortab [hydrocodone-acetaminophen] Itching    Phenergan [promethazine]      SEIZURES    Promethazine hcl        No current facility-administered medications on file prior to encounter.      Current Outpatient Medications on File Prior to Encounter   Medication Sig    acetaminophen (TYLENOL) 325 MG tablet Take 2 tablets (650 mg total) by mouth every 6 (six) hours as needed for Pain (Do not take with any other tylenol containing products).    allopurinol (ZYLOPRIM) 300 MG tablet Take 1 tablet (300 mg total) by mouth once daily.    baclofen (LIORESAL) 10 MG tablet Take 10 mg by mouth 2 (two) times daily.     benazepril (LOTENSIN) 20 MG tablet Take 1 tablet (20 mg total) by mouth once daily. (Patient taking differently: Take 20 mg by mouth 2 (two) times daily. )    buPROPion (WELLBUTRIN XL) 150 MG TB24 tablet Take 1 tablet (150 mg total) by mouth once daily. (Patient taking differently: Take 150 mg by mouth every evening. )    clopidogreL (PLAVIX) 75 mg tablet TAKE 1 TABLET (75 MG TOTAL) BY MOUTH ONCE DAILY.    cyanocobalamin (VITAMIN B-12) 1,000 mcg/mL injection Inject 1,000 mcg as directed every 14 (fourteen) days.     escitalopram oxalate (LEXAPRO) 20 MG tablet Take 1 tablet (20 mg total) by mouth every evening.    gabapentin (NEURONTIN) 400 MG capsule Take 1 capsule (400 mg total) by mouth 4 (four) times daily. (Patient taking differently: Take 400 mg by mouth 2 (two) times daily. )    hydroCHLOROthiazide (HYDRODIURIL) 12.5 MG Tab Take 1 tablet (12.5 mg total) by mouth once daily.    HYDROcodone-acetaminophen (NORCO) 7.5-325 mg per tablet Take 1 tablet by mouth 2 (two) times daily as needed for Pain.    levothyroxine (SYNTHROID) 75 MCG tablet Take 1 tablet (75 mcg total) by " mouth once daily.    metoprolol tartrate (LOPRESSOR) 100 MG tablet Take 1 tablet (100 mg total) by mouth 2 (two) times daily.    ondansetron (ZOFRAN) 4 MG tablet Take 2 tablets (8 mg total) by mouth every 8 (eight) hours as needed for Nausea.    oxybutynin (DITROPAN-XL) 10 MG 24 hr tablet Take 1 tablet (10 mg total) by mouth once daily.    pantoprazole (PROTONIX) 40 MG tablet Take 1 tablet (40 mg total) by mouth once daily.    potassium chloride SA (K-DUR,KLOR-CON) 20 MEQ tablet Take 1 tablet (20 mEq total) by mouth once daily.    rosuvastatin (CRESTOR) 20 MG tablet Take 1 tablet (20 mg total) by mouth once daily.    traZODone (DESYREL) 100 MG tablet TAKE 1 TABLET (100 MG TOTAL) BY MOUTH EVERY EVENING.    [DISCONTINUED] colestipol (COLESTID) 1 gram Tab Take 1 g by mouth daily as needed.     [DISCONTINUED] cyanocobalamin 1,000 mcg/mL injection INJECT 1 ML INTO THE MUSCLE EVERY 14 DAYS.     [DISCONTINUED] glycopyrrolate (ROBINUL) 1 mg Tab TAKE 1 TABLET TWICE DAILY (Patient taking differently: 1 mg 2 (two) times daily. TAKE 1 TABLET TWICE DAILY)    [DISCONTINUED] multivitamin capsule Take 1 capsule by mouth once daily. (Patient not taking: Reported on 1/27/2020)    [DISCONTINUED] ranitidine (ZANTAC) 150 MG tablet Take 150 mg by mouth nightly.      Family History     Problem Relation (Age of Onset)    Cancer Maternal Grandfather    Heart disease Father    Stroke Maternal Grandmother, Mother        Tobacco Use    Smoking status: Current Every Day Smoker     Packs/day: 1.00     Years: 46.00     Pack years: 46.00    Smokeless tobacco: Never Used   Substance and Sexual Activity    Alcohol use: Yes     Comment: RARELY    Drug use: No    Sexual activity: Not Currently     Review of Systems   Constitutional: Positive for activity change, appetite change and fatigue. Negative for chills and fever.   HENT: Negative for congestion, sinus pressure, sinus pain and sore throat.    Eyes: Negative for photophobia and  visual disturbance.   Respiratory: Negative for cough, choking, chest tightness, shortness of breath and wheezing.    Cardiovascular: Positive for leg swelling. Negative for chest pain and palpitations.        Right knee swelling   Gastrointestinal: Positive for abdominal distention, abdominal pain, diarrhea and nausea. Negative for blood in stool, constipation and vomiting.   Genitourinary: Negative for difficulty urinating, dysuria, flank pain and hematuria.        Dark color urine   Musculoskeletal: Positive for back pain, gait problem, joint swelling and myalgias.   Skin: Negative for rash and wound.   Neurological: Negative for dizziness, syncope, weakness, light-headedness, numbness and headaches.   Psychiatric/Behavioral: Negative for confusion. The patient is not nervous/anxious.      Objective:     Vital Signs (Most Recent):  Temp: 98.2 °F (36.8 °C) (03/04/20 1729)  Pulse: 62 (03/04/20 2202)  Resp: 14 (03/04/20 1729)  BP: (!) 85/42 (03/04/20 2202)  SpO2: 98 % (03/04/20 2202) Vital Signs (24h Range):  Temp:  [98.2 °F (36.8 °C)] 98.2 °F (36.8 °C)  Pulse:  [62-71] 62  Resp:  [14] 14  SpO2:  [98 %-100 %] 98 %  BP: ()/(42-56) 85/42     Weight: 80.7 kg (178 lb)  Body mass index is 27.88 kg/m².    Physical Exam   Constitutional: She is oriented to person, place, and time. She appears well-developed and well-nourished. No distress.   Chronically ill-appearing female   HENT:   Head: Normocephalic and atraumatic.   Eyes: Pupils are equal, round, and reactive to light. EOM are normal. Right eye exhibits no discharge. Left eye exhibits no discharge.   Neck: Normal range of motion. No JVD present.   Cardiovascular: Normal rate, regular rhythm, normal heart sounds and intact distal pulses.   No murmur heard.  Pulmonary/Chest: Effort normal and breath sounds normal. No respiratory distress. She has no wheezes. She has no rales. She exhibits no tenderness.   Patient on room air during my initial interview and  physical exam, patient in no acute respiratory distress.   Abdominal: Soft. Bowel sounds are normal. She exhibits no distension. There is no tenderness. There is no rebound and no guarding.   Right lateral abdominal tenderness noted upon palpation   Genitourinary:   Genitourinary Comments: Exam Deferred      Musculoskeletal: Normal range of motion. She exhibits edema. She exhibits no tenderness or deformity.   Right lower extremity knee swelling, tenderness to posterior aspect of right knee upon palpation and movement.   Neurological: She is alert and oriented to person, place, and time. No cranial nerve deficit or sensory deficit.   Skin: Skin is warm and dry. Capillary refill takes 2 to 3 seconds. No rash noted. She is not diaphoretic. No erythema.   Psychiatric: She has a normal mood and affect. Her behavior is normal. Judgment and thought content normal.   Nursing note and vitals reviewed.        CRANIAL NERVES     CN III, IV, VI   Pupils are equal, round, and reactive to light.  Extraocular motions are normal.        Significant Labs:   CBC:   Recent Labs   Lab 03/04/20  1817   WBC 11.17   HGB 11.3*   HCT 36.3*        CMP:   Recent Labs   Lab 03/04/20  1816      K 4.9      CO2 19*      BUN 18   CREATININE 1.8*   CALCIUM 9.9   PROT 6.6   ALBUMIN 3.7   BILITOT 0.3   ALKPHOS 146*   AST 41*   ALT 29   ANIONGAP 10   EGFRNONAA 30*     Lipase:   Recent Labs   Lab 03/04/20  1816   LIPASE 3*     Urinalysis  Recent Labs   Lab 03/04/20  1948   COLORU Yellow   SPECGRAV >=1.030*   PHUR 6.0   PROTEINUA Negative   BACTERIA Rare   NITRITE Negative   LEUKOCYTESUR Trace*   UROBILINOGEN Negative   HYALINECASTS 8*         Significant Imaging: I have reviewed all pertinent imaging results/findings within the past 24 hours.     CT Renal Stone Study:  Impression       No evidence of nephrolithiasis or obstructive uropathy.    Fluid prominence of the small and large bowel loops with no transition point.  The  findings may represent enterocolitis.  Suggest clinical correlation and follow-up.    Postoperative changes as above.    Colonic diverticula without evidence of acute diverticulitis.    Chronic intraluminal lipomas of the ascending and transverse colon.    Additional findings as above.     US RLE:  Impression       No evidence of deep venous thrombosis in the right lower extremity.

## 2020-03-05 NOTE — ED NOTES
Pt presents to the ED with complaints of RUQ abdominal pain which has been ongoing x 1 month. Pt reports pain is worse with eating. Pt also reports urinary symptoms as well including frequency and urgency. Bed rails are up and call light is within patient reach. Will continue to monitor.

## 2020-03-05 NOTE — ASSESSMENT & PLAN NOTE
Magnesium reviewed-   Recent Labs   Lab 03/05/20  0522   MG <0.7*    Will replace electrolytes and continue to monitor closely.  Recheck magnesium at 2:00 p.m.

## 2020-03-05 NOTE — ASSESSMENT & PLAN NOTE
NPO, IV fluid hydration.  Stool studies, review of past medical records reveals history of C diff infection in 2013.  Symptomatic management.  Patient not initiated on IV antibiotics upon admission hospital as patient has no leukocytosis or fever, please readdress if clinically appropriate.

## 2020-03-05 NOTE — ASSESSMENT & PLAN NOTE
Ultrasound of the right lower extremity was performed, negative for acute DVT.  Patient endorses swelling and pain with movement or physical therapy.  P.r.nSyed Musa for pain control.  Follow up with orthopedic surgeon upon discharge from hospital.  PT and OT consults

## 2020-03-05 NOTE — H&P
[facility] Medicine  History & Physical    Patient Name: Alessia Nelson  MRN: [id number]  Admission Date: 3/4/2020  Attending Physician: Mary Kate Holloway MD   Primary Care Provider: Cheryl Bradley NP         Patient information was obtained from patient, past medical records and ER records.     Subjective:     Principal Problem:Acute kidney injury    Chief Complaint:   Chief Complaint   Patient presents with    Abdominal Pain     Pain R mid back to RUQ abdomen with nausea after eating.        HPI: Alessia Nelson is a 64-year-old female with a past medical history of hypertension, hypothyroidism, hyperlipidemia, GERD, insomnia, depression, coronary artery disease, and a TIA who presents to the emergency room tonight with abdominal pain.  Patient states that collectively she has been experiencing abdominal symptoms for the past month including nausea, diarrhea, and abdominal pain. 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  Patient states that last week she began to experience diarrhea, states that it has since slowed down.  Patient endorses right upper abdominal pain, that is worse with eating.  Patient states the pain is similar to her episodes of pancreatitis in the past.  Patient endorses decreased p.o. intake for the past 2 weeks.  Patient does endorse nausea, denies vomiting, states that she has been taking Zofran at home.  Patient endorses that her last bowel movement was yesterday, increased flatulence.  Patient also endorses that her urine looks like tea.  Patient states her last colonoscopy was in 2014, patient does endorse that she was diagnosed with gastric ulcers approximately 20 years ago.  Patient denies rectal bleeding, fever, alcohol use, new medication, or recent travel.  Patient also states that she recently underwent a right knee replacement in December of 2019 per Dr. Briceño.  Patient endorses right knee  swelling and pain with movement and physical therapy.  Patient states today the pain became intolerable to the posterior aspect of her right knee after she was sweeping and mopping her home.  Patient states I could barely walk.  In the emergency room initial workup revealed acute kidney injury with possible enterocolitis on the CT of abdomen and pelvis.  Patient was given 2 L IV normal saline in the emergency room.  Patient placed in observation on 03/04/2020 at approximately 10:30 p.m..  Patient not accompanied by any visitors during my initial interview or physical exam.  Patient states that she resides at home by herself, denies use of ambulatory assist devices or supplemental oxygen.      Past Medical History:   Diagnosis Date    Acute pancreatitis     CAD (coronary artery disease)     CHF (congestive heart failure)     COPD (chronic obstructive pulmonary disease)     Depression     Diverticulosis     Encounter for blood transfusion     GERD (gastroesophageal reflux disease)     History of blood clots     1986 AFTER BOWEL RESCETION    History of bowel resection     Hypertension     Peritonitis     Seizures     HAD A SEIZURE FROM PHENERGAN     Thyroid disease     TIA (transient ischemic attack)        Past Surgical History:   Procedure Laterality Date    ADRENAL GLAND SURGERY      APPENDECTOMY      BACK SURGERY      CHOLECYSTECTOMY      COLONOSCOPY      CORONARY STENT PLACEMENT      CYSTOURETHROSCOPY N/A 11/13/2019    Procedure: CYSTOURETHROSCOPY;  Surgeon: Shun Nuñez MD;  Location: [facility] OR;  Service: Urology;  Laterality: N/A;    ENDOSCOPIC ULTRASOUND OF UPPER GASTROINTESTINAL TRACT N/A 11/25/2019    Procedure: ULTRASOUND, UPPER GI TRACT, ENDOSCOPIC;  Surgeon: Alhaji Bridges MD;  Location: [address]);  Service: Endoscopy;  Laterality: N/A;  5 day hold Plavix, Dr Jovanni Serrano - pg  PM prep    HERNIA REPAIR      HYSTERECTOMY      INCISIONAL HERNIA REPAIR       "INJECTION OF ANESTHETIC AGENT AROUND NERVE Right 5/27/2019    Procedure: RIGHT L5-S3 MEDIAL BRANCH BLOCKS;  Surgeon: Sneha Aragon MD;  Location: [facility] OR;  Service: Pain Management;  Laterality: Right;  **DO NOT STOP PLAVIX**    instestine      KNEE ARTHROPLASTY Right 12/18/2019    Procedure: ARTHROPLASTY, KNEE;  Surgeon: Ike Briceño II, MD;  Location: [facility] OR;  Service: Orthopedics;  Laterality: Right;    KNEE SURGERY  1983    PARATHYROID GLAND SURGERY      3 surgeries    RADIOFREQUENCY ABLATION Right 6/10/2019    Procedure: Radiofrequency Ablation - RIGHT L3-5 RADIOFREQUENCY ABLATION WITH HALYARD COOLIEF THERMAL SYSTEM;  Surgeon: Sneha Aragon MD;  Location: [facility] OR;  Service: Pain Management;  Laterality: Right;  **HOLD PLAVIX x 7 DAYS PRIOR**    UPPER GASTROINTESTINAL ENDOSCOPY         Review of patient's allergies indicates:   Allergen Reactions    Aspirin      "Makes stomach feel like it's on fire"    Lortab [hydrocodone-acetaminophen] Itching    Phenergan [promethazine]      SEIZURES    Promethazine hcl        No current facility-administered medications on file prior to encounter.      Current Outpatient Medications on File Prior to Encounter   Medication Sig    acetaminophen (TYLENOL) 325 MG tablet Take 2 tablets (650 mg total) by mouth every 6 (six) hours as needed for Pain (Do not take with any other tylenol containing products).    allopurinol (ZYLOPRIM) 300 MG tablet Take 1 tablet (300 mg total) by mouth once daily.    baclofen (LIORESAL) 10 MG tablet Take 10 mg by mouth 2 (two) times daily.     benazepril (LOTENSIN) 20 MG tablet Take 1 tablet (20 mg total) by mouth once daily. (Patient taking differently: Take 20 mg by mouth 2 (two) times daily. )    buPROPion (WELLBUTRIN XL) 150 MG TB24 tablet Take 1 tablet (150 mg total) by mouth once daily. (Patient taking differently: Take 150 mg by mouth every evening. )    clopidogreL (PLAVIX) 75 mg tablet TAKE 1 TABLET (75 MG TOTAL) BY " MOUTH ONCE DAILY.    cyanocobalamin (VITAMIN B-12) 1,000 mcg/mL injection Inject 1,000 mcg as directed every 14 (fourteen) days.     escitalopram oxalate (LEXAPRO) 20 MG tablet Take 1 tablet (20 mg total) by mouth every evening.    gabapentin (NEURONTIN) 400 MG capsule Take 1 capsule (400 mg total) by mouth 4 (four) times daily. (Patient taking differently: Take 400 mg by mouth 2 (two) times daily. )    hydroCHLOROthiazide (HYDRODIURIL) 12.5 MG Tab Take 1 tablet (12.5 mg total) by mouth once daily.    HYDROcodone-acetaminophen (NORCO) 7.5-325 mg per tablet Take 1 tablet by mouth 2 (two) times daily as needed for Pain.    levothyroxine (SYNTHROID) 75 MCG tablet Take 1 tablet (75 mcg total) by mouth once daily.    metoprolol tartrate (LOPRESSOR) 100 MG tablet Take 1 tablet (100 mg total) by mouth 2 (two) times daily.    ondansetron (ZOFRAN) 4 MG tablet Take 2 tablets (8 mg total) by mouth every 8 (eight) hours as needed for Nausea.    oxybutynin (DITROPAN-XL) 10 MG 24 hr tablet Take 1 tablet (10 mg total) by mouth once daily.    pantoprazole (PROTONIX) 40 MG tablet Take 1 tablet (40 mg total) by mouth once daily.    potassium chloride SA (K-DUR,KLOR-CON) 20 MEQ tablet Take 1 tablet (20 mEq total) by mouth once daily.    rosuvastatin (CRESTOR) 20 MG tablet Take 1 tablet (20 mg total) by mouth once daily.    traZODone (DESYREL) 100 MG tablet TAKE 1 TABLET (100 MG TOTAL) BY MOUTH EVERY EVENING.    [DISCONTINUED] colestipol (COLESTID) 1 gram Tab Take 1 g by mouth daily as needed.     [DISCONTINUED] cyanocobalamin 1,000 mcg/mL injection INJECT 1 ML INTO THE MUSCLE EVERY 14 DAYS.     [DISCONTINUED] glycopyrrolate (ROBINUL) 1 mg Tab TAKE 1 TABLET TWICE DAILY (Patient taking differently: 1 mg 2 (two) times daily. TAKE 1 TABLET TWICE DAILY)    [DISCONTINUED] multivitamin capsule Take 1 capsule by mouth once daily. (Patient not taking: Reported on 1/27/2020)    [DISCONTINUED] ranitidine (ZANTAC) 150 MG tablet  Take 150 mg by mouth nightly.      Family History     Problem Relation (Age of Onset)    Cancer Maternal Grandfather    Heart disease Father    Stroke Maternal Grandmother, Mother        Tobacco Use    Smoking status: Current Every Day Smoker     Packs/day: 1.00     Years: 46.00     Pack years: 46.00    Smokeless tobacco: Never Used   Substance and Sexual Activity    Alcohol use: Yes     Comment: RARELY    Drug use: No    Sexual activity: Not Currently     Review of Systems   Constitutional: Positive for activity change, appetite change and fatigue. Negative for chills and fever.   HENT: Negative for congestion, sinus pressure, sinus pain and sore throat.    Eyes: Negative for photophobia and visual disturbance.   Respiratory: Negative for cough, choking, chest tightness, shortness of breath and wheezing.    Cardiovascular: Positive for leg swelling. Negative for chest pain and palpitations.        Right knee swelling   Gastrointestinal: Positive for abdominal distention, abdominal pain, diarrhea and nausea. Negative for blood in stool, constipation and vomiting.   Genitourinary: Negative for difficulty urinating, dysuria, flank pain and hematuria.        Dark color urine   Musculoskeletal: Positive for back pain, gait problem, joint swelling and myalgias.   Skin: Negative for rash and wound.   Neurological: Negative for dizziness, syncope, weakness, light-headedness, numbness and headaches.   Psychiatric/Behavioral: Negative for confusion. The patient is not nervous/anxious.      Objective:     Vital Signs (Most Recent):  Temp: 98.2 °F (36.8 °C) (03/04/20 1729)  Pulse: 62 (03/04/20 2202)  Resp: 14 (03/04/20 1729)  BP: (!) 85/42 (03/04/20 2202)  SpO2: 98 % (03/04/20 2202) Vital Signs (24h Range):  Temp:  [98.2 °F (36.8 °C)] 98.2 °F (36.8 °C)  Pulse:  [62-71] 62  Resp:  [14] 14  SpO2:  [98 %-100 %] 98 %  BP: ()/(42-56) 85/42     Weight: 80.7 kg (178 lb)  Body mass index is 27.88 kg/m².    Physical Exam    Constitutional: She is oriented to person, place, and time. She appears well-developed and well-nourished. No distress.   Chronically ill-appearing female   HENT:   Head: Normocephalic and atraumatic.   Eyes: Pupils are equal, round, and reactive to light. EOM are normal. Right eye exhibits no discharge. Left eye exhibits no discharge.   Neck: Normal range of motion. No JVD present.   Cardiovascular: Normal rate, regular rhythm, normal heart sounds and intact distal pulses.   No murmur heard.  Pulmonary/Chest: Effort normal and breath sounds normal. No respiratory distress. She has no wheezes. She has no rales. She exhibits no tenderness.   Patient on room air during my initial interview and physical exam, patient in no acute respiratory distress.   Abdominal: Soft. Bowel sounds are normal. She exhibits no distension. There is no tenderness. There is no rebound and no guarding.   Right lateral abdominal tenderness noted upon palpation   Genitourinary:   Genitourinary Comments: Exam Deferred      Musculoskeletal: Normal range of motion. She exhibits edema. She exhibits no tenderness or deformity.   Right lower extremity knee swelling, tenderness to posterior aspect of right knee upon palpation and movement.   Neurological: She is alert and oriented to person, place, and time. No cranial nerve deficit or sensory deficit.   Skin: Skin is warm and dry. Capillary refill takes 2 to 3 seconds. No rash noted. She is not diaphoretic. No erythema.   Psychiatric: She has a normal mood and affect. Her behavior is normal. Judgment and thought content normal.   Nursing note and vitals reviewed.        CRANIAL NERVES     CN III, IV, VI   Pupils are equal, round, and reactive to light.  Extraocular motions are normal.        Significant Labs:   CBC:   Recent Labs   Lab 03/04/20  1817   WBC 11.17   HGB 11.3*   HCT 36.3*        CMP:   Recent Labs   Lab 03/04/20  1816      K 4.9      CO2 19*      BUN 18    CREATININE 1.8*   CALCIUM 9.9   PROT 6.6   ALBUMIN 3.7   BILITOT 0.3   ALKPHOS 146*   AST 41*   ALT 29   ANIONGAP 10   EGFRNONAA 30*     Lipase:   Recent Labs   Lab 03/04/20  1816   LIPASE 3*     Urinalysis  Recent Labs   Lab 03/04/20 1948   COLORU Yellow   SPECGRAV >=1.030*   PHUR 6.0   PROTEINUA Negative   BACTERIA Rare   NITRITE Negative   LEUKOCYTESUR Trace*   UROBILINOGEN Negative   HYALINECASTS 8*         Significant Imaging: I have reviewed all pertinent imaging results/findings within the past 24 hours.     CT Renal Stone Study:  Impression       No evidence of nephrolithiasis or obstructive uropathy.    Fluid prominence of the small and large bowel loops with no transition point.  The findings may represent enterocolitis.  Suggest clinical correlation and follow-up.    Postoperative changes as above.    Colonic diverticula without evidence of acute diverticulitis.    Chronic intraluminal lipomas of the ascending and transverse colon.    Additional findings as above.     US RLE:  Impression       No evidence of deep venous thrombosis in the right lower extremity.         Assessment/Plan:     * Acute kidney injury  Likely pre-renal etiology as patient reports diarrhea x1 week.  Patient was given 2 L IV normal saline in emergency room, will continue IV fluid hydration upon admission to hospital.  Trend renal function with daily CMP.  Avoid nephrotoxic agents, renally dose medications.  Strict intake and output.  Hold home hydrochlorothiazide.      Enterocolitis  NPO, IV fluid hydration.  Stool studies, review of past medical records reveals history of C diff infection in 2013.  Symptomatic management.  Patient not initiated on IV antibiotics upon admission hospital as patient has no leukocytosis or fever, please readdress if clinically appropriate.      Chronic diarrhea  Noted      Normocytic anemia  Monitor and transfuse if patient becomes hemodynamically unstable or H/H < 7/21  Daily CBC.  No evidence of  active or acute bleeding noted on my initial interview or physical exam.      Status post right knee replacement  Ultrasound of the right lower extremity was performed, negative for acute DVT.  Patient endorses swelling and pain with movement or physical therapy.  P.rainsley Musa for pain control.  Follow up with orthopedic surgeon upon discharge from hospital.    CAD (coronary artery disease)  Chronic, controlled.  Will continue home medication.  Continuous telemetry monitoring.      Hyperlipidemia  Chronic, controlled. Will continue home medication.    Lab Results   Component Value Date    CHOL 89 (L) 07/24/2019     Lab Results   Component Value Date    HDL 40 07/24/2019     Lab Results   Component Value Date    LDLCALC 8.0 (L) 07/24/2019     Lab Results   Component Value Date    TRIG 205 (H) 07/24/2019     Lab Results   Component Value Date    CHOLHDL 44.9 07/24/2019               Hypothyroidism  Lab Results   Component Value Date    TSH 8.576 (H) 11/26/2019     Chronic, uncontrolled?  Will continue home medication.      GERD (gastroesophageal reflux disease)  Chronic, controlled.  Will initiate IV PPI in the setting of NPO.      Fibromyalgia  Chronic, controlled.  Will continue home medications.  Patient states she follows with pain management as an outpatient.      Mild episode of recurrent major depressive disorder  Chronic, controlled.  Will continue home medications.      Nicotine dependence  Assistance with smoking cessation was offered, including:  [x]  Medications  []  Counseling  []  Printed Information on Smoking Cessation  []  Referral to a Smoking Cessation Program    Patient was counseled regarding smoking for >10 minutes.          COPD (chronic obstructive pulmonary disease)  Chronic, controlled.  Will continue home medication.  Duo-nebs as needed.        VTE Risk Mitigation (From admission, onward)    None         Time spent seeing patient( greater than 1/2 spent in direct contact) : 65 minutes      Luci Raymond NP  Department of Hospital Medicine   [facility]

## 2020-03-05 NOTE — ASSESSMENT & PLAN NOTE
Lab Results   Component Value Date    TSH 8.576 (H) 11/26/2019     Chronic, TSH elevated, check free T4.  Will continue home medication.

## 2020-03-05 NOTE — PROGRESS NOTES
[facility] Hospital Medicine  Progress Note    Patient Name: Alessia Nelson  MRN: [id number]  Patient Class: OP- Observation   Admission Date: 3/4/2020  Length of Stay: 0 days  Attending Physician: Jailene Cabrera MD  Primary Care Provider: Cheryl Bradley NP        Subjective:     Principal Problem:Acute kidney injury        HPI:  Alessia Nelson is a 64-year-old female with a past medical history of hypertension, hypothyroidism, hyperlipidemia, GERD, insomnia, depression, coronary artery disease, and a TIA [facility]  Patient states that collectively she has been experiencing abdominal symptoms for the past month including nausea, diarrhea, and abdominal pain. 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  Patient states that last week she began to experience diarrhea, states that it has since slowed down.  Patient endorses right upper abdominal pain, that is worse with eating.  Patient states the pain is similar to her episodes of pancreatitis in the past.  Patient endorses decreased p.o. intake for the past 2 weeks.  Patient does endorse nausea, denies vomiting, states that she has been taking Zofran at home.  Patient endorses that her last bowel movement was yesterday, increased flatulence.  Patient also endorses that her urine looks like tea.  Patient states her last colonoscopy was in 2014, patient does endorse that she was diagnosed with gastric ulcers approximately 20 years ago.  Patient denies rectal bleeding, fever, alcohol use, new medication, or recent travel.  Patient also states that she recently underwent a right knee replacement in December of 2019 per Dr. Briceño.  Patient endorses right knee swelling and pain with movement and physical therapy.  Patient states today the pain became intolerable to the posterior aspect of her right knee after she was sweeping and mopping her  "home.  Patient states I could barely walk.  In the emergency room initial workup revealed acute kidney injury with possible enterocolitis on the CT of abdomen and pelvis.  Patient was given 2 L IV normal saline in the emergency room.  Patient placed in observation on 03/04/2020 at approximately 10:30 p.m..  Patient not accompanied by any visitors during my initial interview or physical exam.  Patient states that she resides at home by herself, denies use of ambulatory assist devices or supplemental oxygen.      Overview/Hospital Course:  No notes on file    Interval History:  Patient seen and examined.  Continues to have diarrhea.  Stool appears "seedy" and is not foul-smelling per nurse.  Antigen positive, C diff toxin negative.  PCR pending.  Patient complaining of generalized abdominal pain which is worse on the right side.  Also complaining of right knee pain, had total knee arthroplasty with Dr. Briceño a few months ago.    Review of Systems   Constitutional: Positive for activity change, appetite change and fatigue. Negative for chills and fever.   HENT: Negative for congestion, sinus pressure, sinus pain and sore throat.    Eyes: Negative for photophobia and visual disturbance.   Respiratory: Negative for cough, choking, chest tightness, shortness of breath and wheezing.    Cardiovascular: Positive for leg swelling. Negative for chest pain and palpitations.   Gastrointestinal: Positive for abdominal distention, abdominal pain, diarrhea and nausea. Negative for blood in stool, constipation and vomiting.   Genitourinary: Negative for difficulty urinating, dysuria, flank pain and hematuria.        Dark color urine   Musculoskeletal: Positive for back pain, gait problem, joint swelling and myalgias.   Skin: Negative for rash and wound.   Neurological: Negative for dizziness, syncope, weakness, light-headedness, numbness and headaches.   Psychiatric/Behavioral: Negative for confusion. The patient is not " nervous/anxious.      Objective:     Vital Signs (Most Recent):  Temp: 96.7 °F (35.9 °C) (03/05/20 1144)  Pulse: 61 (03/05/20 1144)  Resp: 18 (03/05/20 1144)  BP: (!) 113/54 (03/05/20 1144)  SpO2: 98 % (03/05/20 1144) Vital Signs (24h Range):  Temp:  [96.7 °F (35.9 °C)-98.2 °F (36.8 °C)] 96.7 °F (35.9 °C)  Pulse:  [61-74] 61  Resp:  [14-18] 18  SpO2:  [94 %-100 %] 98 %  BP: ()/(42-70) 113/54     Weight: 85 kg (187 lb 6.4 oz)(bed scale)  Body mass index is 29.35 kg/m².    Intake/Output Summary (Last 24 hours) at 3/5/2020 1313  Last data filed at 3/5/2020 0630  Gross per 24 hour   Intake 2570 ml   Output --   Net 2570 ml      Physical Exam   Constitutional: She is oriented to person, place, and time. She appears well-developed and well-nourished. No distress.   Chronically ill-appearing female   HENT:   Head: Normocephalic and atraumatic.   Eyes: Pupils are equal, round, and reactive to light. EOM are normal. Right eye exhibits no discharge. Left eye exhibits no discharge.   Neck: Normal range of motion. No JVD present.   Cardiovascular: Normal rate, regular rhythm, normal heart sounds and intact distal pulses.   No murmur heard.  Pulmonary/Chest: Effort normal and breath sounds normal. No respiratory distress. She has no wheezes. She has no rales. She exhibits no tenderness.   On room air   Abdominal: Soft. Bowel sounds are normal. She exhibits no distension. There is no tenderness. There is no rebound and no guarding.   Right lateral abdominal tenderness noted upon palpation   Genitourinary:   Genitourinary Comments: Exam Deferred      Musculoskeletal: Normal range of motion. She exhibits edema. She exhibits no tenderness or deformity.   Mild right knee swelling pain on lateral aspect of patella   Neurological: She is alert and oriented to person, place, and time. No cranial nerve deficit or sensory deficit.   Skin: Skin is warm and dry. Capillary refill takes 2 to 3 seconds. No rash noted. She is not  diaphoretic. No erythema.   Psychiatric: She has a normal mood and affect. Her behavior is normal. Judgment and thought content normal.   Nursing note and vitals reviewed.      Significant Labs: All pertinent labs within the past 24 hours have been reviewed.    Significant Imaging: I have reviewed and interpreted all pertinent imaging results/findings within the past 24 hours.      Assessment/Plan:      * Acute kidney injury  Improving with IV fluid hydration.  Hold home hydrochlorothiazide.      Hypomagnesemia  Magnesium reviewed-   Recent Labs   Lab 03/05/20  0522   MG <0.7*    Will replace electrolytes and continue to monitor closely.  Recheck magnesium at 2:00 p.m.           Enterocolitis  Continue IV fluid hydration.  Stool studies, review of past medical records reveals history of C diff infection in 2013.  Symptomatic management.  Cipro and Flagyl for now.  C diff antigen positive but toxin negative, PCR in process.  If PCR is positive will change medications to p.o. vancomycin.    Normocytic anemia  Monitor and transfuse if patient becomes hemodynamically unstable or H/H < 7/21  Daily CBC.  No evidence of active or acute bleeding noted on my initial interview or physical exam.      Status post right knee replacement  Ultrasound of the right lower extremity was performed, negative for acute DVT.  Patient endorses swelling and pain with movement or physical therapy.  P.r.n. Norco for pain control.  Follow up with orthopedic surgeon upon discharge from hospital.  PT and OT consults    CAD (coronary artery disease)  Chronic, controlled.  Will continue home medication.  Continuous telemetry monitoring.      Chronic diarrhea  Noted      Hyperlipidemia  Chronic, controlled. Will continue home medication.    Lab Results   Component Value Date    CHOL 89 (L) 07/24/2019     Lab Results   Component Value Date    HDL 40 07/24/2019     Lab Results   Component Value Date    LDLCALC 8.0 (L) 07/24/2019     Lab Results    Component Value Date    TRIG 205 (H) 07/24/2019     Lab Results   Component Value Date    CHOLHDL 44.9 07/24/2019               Hypothyroidism  Lab Results   Component Value Date    TSH 8.576 (H) 11/26/2019     Chronic, TSH elevated, check free T4.  Will continue home medication.      GERD (gastroesophageal reflux disease)  Chronic, controlled.  Continue PPI      Fibromyalgia  Chronic, controlled.  Will continue home medications.  Patient states she follows with pain management as an outpatient.      Mild episode of recurrent major depressive disorder  Chronic, controlled.  Will continue home medications.      Nicotine dependence  Assistance with smoking cessation was offered, including:  [x]  Medications  []  Counseling  []  Printed Information on Smoking Cessation  []  Referral to a Smoking Cessation Program    Patient was counseled regarding smoking for >10 minutes.          COPD (chronic obstructive pulmonary disease)  Chronic, controlled.  Will continue home medication.  Duo-nebs as needed.        VTE Risk Mitigation (From admission, onward)         Ordered     IP VTE HIGH RISK PATIENT  Once      03/05/20 0223     Place ELENI hose  Until discontinued      03/05/20 0223     Place sequential compression device  Until discontinued      03/05/20 0223                      Jailene Cabrera MD  Department of Hospital Medicine   [facility]

## 2020-03-05 NOTE — RESPIRATORY THERAPY
03/05/20 0831   Patient Assessment/Suction   Level of Consciousness (AVPU) alert   Respiratory Effort Normal;Unlabored   PRE-TX-O2   O2 Device (Oxygen Therapy) room air   SpO2 97 %   Pulse Oximetry Type Intermittent   $ Pulse Oximetry - Multiple Charge Pulse Oximetry - Multiple   Aerosol Therapy   $ Aerosol Therapy Charges PRN treatment not required   Pt rec PRN duoneb treatments

## 2020-03-05 NOTE — ASSESSMENT & PLAN NOTE
Chronic, controlled.  Will continue home medications.  Patient states she follows with pain management as an outpatient.

## 2020-03-05 NOTE — ED PROVIDER NOTES
"Encounter Date: 3/4/2020    SCRIBE #1 NOTE: I, Jane Mercado, am scribing for, and in the presence of, Michelle Dc PA-C.       History     Chief Complaint   Patient presents with    Abdominal Pain     Pain R mid back to RUQ abdomen with nausea after eating.       Time seen by provider: 7:05 PM on 03/04/2020    Alessia Nelson is a 64 y.o. female with a PMHx of pancreatitis, kidney stones, peritonitis, chronic diarrhea and diverticulosis who presents to the ED with an onset of worsening RUQ pain that radiates to the back and nausea for 1 month. Patient endorses abdominal pain is eased when she refrains from eating and returns after eating. Patient also complains of decreased urine output, burning with urination, dark urine and diarrhea for 1 week with an average of 5 episodes per day. She reports chronic diarrhea but states it has worsened. She denied any blood or mucous in her stools. She has also had intermittent swelling to the right knee after activity. The patient denies any other symptoms at this time. PSHx includes bowel resection, hernia repair, hysterectomy, cholecystectomy, appendectomy and cystourethroscopy.      The history is provided by the patient.     Review of patient's allergies indicates:   Allergen Reactions    Aspirin      "Makes stomach feel like it's on fire"    Lortab [hydrocodone-acetaminophen] Itching    Phenergan [promethazine]      SEIZURES    Promethazine hcl      Past Medical History:   Diagnosis Date    Acute pancreatitis     CAD (coronary artery disease)     CHF (congestive heart failure)     COPD (chronic obstructive pulmonary disease)     Depression     Diverticulosis     Encounter for blood transfusion     GERD (gastroesophageal reflux disease)     History of blood clots     1986 AFTER BOWEL RESCETION    History of bowel resection     Hypertension     Peritonitis     Seizures     HAD A SEIZURE FROM PHENERGAN     Thyroid disease     TIA (transient " ischemic attack)      Past Surgical History:   Procedure Laterality Date    ADRENAL GLAND SURGERY      APPENDECTOMY      BACK SURGERY      CHOLECYSTECTOMY      COLONOSCOPY      CORONARY STENT PLACEMENT      CYSTOURETHROSCOPY N/A 11/13/2019    Procedure: CYSTOURETHROSCOPY;  Surgeon: Shun Nuñez MD;  Location: [facility] OR;  Service: Urology;  Laterality: N/A;    ENDOSCOPIC ULTRASOUND OF UPPER GASTROINTESTINAL TRACT N/A 11/25/2019    Procedure: ULTRASOUND, UPPER GI TRACT, ENDOSCOPIC;  Surgeon: Alhaji Bridges MD;  Location: [facility] ENDO ([address] [place] [address]);  Service: Endoscopy;  Laterality: N/A;  5 day hold Plavix, Dr Jovanni Serrano - pg  PM prep    HERNIA REPAIR      HYSTERECTOMY      INCISIONAL HERNIA REPAIR      INJECTION OF ANESTHETIC AGENT AROUND NERVE Right 5/27/2019    Procedure: RIGHT L5-S3 MEDIAL BRANCH BLOCKS;  Surgeon: Sneha Aragon MD;  Location: [facility];  Service: Pain Management;  Laterality: Right;  **DO NOT STOP PLAVIX**    instestine      KNEE ARTHROPLASTY Right 12/18/2019    Procedure: ARTHROPLASTY, KNEE;  Surgeon: Ike Briceño II, MD;  Location: [facility] OR;  Service: Orthopedics;  Laterality: Right;    KNEE SURGERY  1983    PARATHYROID GLAND SURGERY      3 surgeries    RADIOFREQUENCY ABLATION Right 6/10/2019    Procedure: Radiofrequency Ablation - RIGHT L3-5 RADIOFREQUENCY ABLATION WITH ButterYARD COOLIEF THERMAL SYSTEM;  Surgeon: Sneha Aragon MD;  Location: [facility];  Service: Pain Management;  Laterality: Right;  **HOLD PLAVIX x 7 DAYS PRIOR**    UPPER GASTROINTESTINAL ENDOSCOPY       Family History   Problem Relation Age of Onset    Stroke Maternal Grandmother     Cancer Maternal Grandfather     Stroke Mother     Heart disease Father      Social History     Tobacco Use    Smoking status: Current Every Day Smoker     Packs/day: 1.00     Years: 46.00     Pack years: 46.00    Smokeless tobacco: Never Used   Substance Use Topics    Alcohol use: Yes     Comment: RARELY    Drug use:  No     Review of Systems   Constitutional: Negative for activity change, appetite change, chills and fever.   HENT: Negative for congestion, rhinorrhea and sore throat.    Eyes: Negative for redness and visual disturbance.   Respiratory: Negative for cough, chest tightness and shortness of breath.    Cardiovascular: Negative for chest pain.   Gastrointestinal: Positive for abdominal pain, diarrhea and nausea. Negative for vomiting.   Genitourinary: Positive for decreased urine volume and dysuria. Negative for frequency.        + Dark urine   Musculoskeletal: Positive for joint swelling (right knee). Negative for back pain, neck pain and neck stiffness.   Skin: Negative for rash.   Neurological: Negative for dizziness, syncope, numbness and headaches.       Physical Exam     Initial Vitals [03/04/20 1729]   BP Pulse Resp Temp SpO2   (!) 110/53 66 14 98.2 °F (36.8 °C) 99 %      MAP       --         Physical Exam    Nursing note and vitals reviewed.  Constitutional: She appears well-developed and well-nourished. She is cooperative.  Non-toxic appearance. She does not have a sickly appearance.   HENT:   Head: Normocephalic and atraumatic.   Right Ear: External ear normal.   Left Ear: External ear normal.   Nose: Nose normal.   Mouth/Throat: Uvula is midline.   Eyes: Conjunctivae and lids are normal.   Neck: Normal range of motion and full passive range of motion without pain. Neck supple.   Cardiovascular: Normal rate, regular rhythm and normal heart sounds. Exam reveals no gallop and no friction rub.    No murmur heard.  Pulmonary/Chest: Breath sounds normal. She has no wheezes. She has no rhonchi. She has no rales.   Abdominal: Soft. Normal appearance. There is tenderness in the right upper quadrant. There is no rigidity, no rebound and no guarding.   Right flank pain.   Neurological: She is alert.   Skin: Skin is warm, dry and intact. No rash noted.         ED Course   Procedures  Labs Reviewed   CBC W/ AUTO  DIFFERENTIAL - Abnormal; Notable for the following components:       Result Value    RBC 3.78 (*)     Hemoglobin 11.3 (*)     Hematocrit 36.3 (*)     Mean Corpuscular Hemoglobin Conc 31.1 (*)     RDW 15.5 (*)     Gran # (ANC) 7.9 (*)     All other components within normal limits   COMPREHENSIVE METABOLIC PANEL - Abnormal; Notable for the following components:    CO2 19 (*)     Creatinine 1.8 (*)     Alkaline Phosphatase 146 (*)     AST 41 (*)     eGFR if  34 (*)     eGFR if non  30 (*)     All other components within normal limits   LIPASE - Abnormal; Notable for the following components:    Lipase 3 (*)     All other components within normal limits   URINALYSIS, REFLEX TO URINE CULTURE - Abnormal; Notable for the following components:    Specific Gravity, UA >=1.030 (*)     Leukocytes, UA Trace (*)     All other components within normal limits    Narrative:     Preferred Collection Type->Urine, Clean Catch   URINALYSIS MICROSCOPIC - Abnormal; Notable for the following components:    WBC, UA 7 (*)     Hyaline Casts, UA 8 (*)     All other components within normal limits    Narrative:     Preferred Collection Type->Urine, Clean Catch          Imaging Results          CT Renal Stone Study ABD Pelvis WO (Final result)  Result time 03/04/20 21:13:41    Final result by Aubrey Wolf MD (03/04/20 21:13:41)                 Impression:      No evidence of nephrolithiasis or obstructive uropathy.    Fluid prominence of the small and large bowel loops with no transition point.  The findings may represent enterocolitis.  Suggest clinical correlation and follow-up.    Postoperative changes as above.    Colonic diverticula without evidence of acute diverticulitis.    Chronic intraluminal lipomas of the ascending and transverse colon.    Additional findings as above.      Electronically signed by: Aubrey Wolf MD  Date:    03/04/2020  Time:    21:13             Narrative:    EXAMINATION:  CT RENAL  STONE STUDY ABD PELVIS WO    CLINICAL HISTORY:  Flank pain, stone disease suspected;    TECHNIQUE:  Axial CT scan of the abdomen and pelvis was obtained from the level of the hemidiaphragms to the pubic symphysis without intravenous contrast. Coronal and sagittal reformats were obtained. The study was performed using a renal stone protocol.  Therefore, no intravenous or oral IV contrast was administered.    COMPARISON:  10/03/2019.    FINDINGS:  There are no pleural effusions.  There is no evidence of a pneumothorax.  No airspace opacity is present.  No discrete pulmonary nodule is identified.    The heart is unremarkable.  There is a left-sided aneurysmal portion just below the level of the infrarenal abdominal aorta with diameter measuring 3.4 cm.  This is not significant changed compared to the prior examination.  There are extensive calcifications along the course of the abdominal aorta and its branch vessels.  There is no evidence of lymphadenopathy in the abdomen or pelvis.    The esophagus, stomach, and duodenum are within normal limits.  There are scattered air-fluid levels within the small bowel loops with no transition point.  There are changes of prior appendectomy.  There is large amount of stool within the right-sided colon.  There is a 3.3 cm lipoma in the wall of the hepatic flexure of the transverse colon.  Additional smaller the pulmonary identified within the ascending colon.  There is also distention of the remainder of the transverse colon.  There is colonic diverticula without evidence of acute diverticulitis.    The liver is enlarged.  There are changes of prior cholecystectomy.  There is no abnormality in the gallbladder fossa.  The biliary tree is unremarkable.  The spleen is within normal limits.  The pancreas is unremarkable.    The right adrenal gland is unremarkable.  The left adrenal gland is absent.  There is no evidence of nephrolithiasis.  There is mild bilateral perinephric  stranding.  The ureters are unremarkable.  The urinary bladder is within normal limits.  The patient appears status post hysterectomy.    There is no evidence of free fluid in the abdomen or pelvis.  There is no evidence of free air.  There is no evidence of pneumatosis.  No portal venous air is identified.    The psoas margins are unremarkable.  There are extensive calcifications within the abdominal wall.  There are postoperative changes of posterior instrumented fusion at the L4-L5 level.  There are associated laminectomy changes at this level.  The hardware appear grossly intact.  The disc space at the L4-L5 levels appears within normal limits.  There is no evidence of a fracture.                                 Medical Decision Making:   History:   Old Medical Records: I decided to obtain old medical records.  Clinical Tests:   Lab Tests: Ordered and Reviewed  Radiological Study: Ordered and Reviewed       APC / Resident Notes:   Urgent evaluation of a 64 year old female who presents with abdominal pain and urinary symptoms. She states it feel similar to her previous pancreatitis flare ups. She has tenderness to the right flank and right upper abdominal pain. She has no rebound or guarding. Labs show no leukocytosis. UA shows only 7 WBC. Lipase is normal. CT scan shows colitis. She has TINO and was given IVF hydration. She will be placed in observation for IVF and repeat labs in the AM. Patient updated on the plan of care.        Scribe Attestation:   Scribe #1: I performed the above scribed service and the documentation accurately describes the services I performed. I attest to the accuracy of the note.    I, Michelle Dc PA-C, personally performed the services described in this documentation. All medical record entries made by the scribe were at my direction and in my presence.  I have reviewed the chart and agree that the record reflects my personal performance and is accurate and complete. Michelle  TREY Dc.  10:27 PM 03/04/2020                        Clinical Impression:       ICD-10-CM ICD-9-CM   1. Acute kidney injury N17.9 584.9   2. Colitis K52.9 558.9         Disposition:   Disposition: Placed in Observation     ED Disposition Condition    Observation                           Michelle Dc PA-C  03/04/20 2234

## 2020-03-05 NOTE — ASSESSMENT & PLAN NOTE
Ultrasound of the right lower extremity was performed, negative for acute DVT.  Patient endorses swelling and pain with movement or physical therapy.  P.rSyednSyed Musa for pain control.  Follow up with orthopedic surgeon upon discharge from hospital.

## 2020-03-05 NOTE — PLAN OF CARE
Patient arrived to unit in NAD. Oriented to room and use of call light. Patient AAO. VSS, afebrile. Isolation precautions initiated d/t possible c. Diff dx. Tele # [id number] monitored. Moderate pain control with PRN pain medication. C/O nausea treated with IV Zofran. Tolerating clear liquids. IVF infusing per order. Ambulatory to  with standby assist. Currently refusing SCDs. Patient resting quietly throughout majority of shift. No complaints noted at this time. Hourly/q2 rounds done to promote safety. Bed in lowest position, brakes locked, call light within reach. Continuing to monitor.

## 2020-03-05 NOTE — ASSESSMENT & PLAN NOTE
Lab Results   Component Value Date    TSH 8.576 (H) 11/26/2019     Chronic, uncontrolled?  Will continue home medication.

## 2020-03-06 DIAGNOSIS — Z96.651 STATUS POST RIGHT KNEE REPLACEMENT: Primary | ICD-10-CM

## 2020-03-06 PROBLEM — A04.72 C. DIFFICILE COLITIS: Status: ACTIVE | Noted: 2020-03-06

## 2020-03-06 LAB
ALBUMIN SERPL BCP-MCNC: 2.6 G/DL (ref 3.5–5.2)
ALP SERPL-CCNC: 105 U/L (ref 55–135)
ALT SERPL W/O P-5'-P-CCNC: 18 U/L (ref 10–44)
ANION GAP SERPL CALC-SCNC: 8 MMOL/L (ref 8–16)
AST SERPL-CCNC: 19 U/L (ref 10–40)
BASOPHILS # BLD AUTO: 0.04 K/UL (ref 0–0.2)
BASOPHILS NFR BLD: 0.7 % (ref 0–1.9)
BILIRUB SERPL-MCNC: 0.2 MG/DL (ref 0.1–1)
BUN SERPL-MCNC: 7 MG/DL (ref 8–23)
CALCIUM SERPL-MCNC: 9.3 MG/DL (ref 8.7–10.5)
CHLORIDE SERPL-SCNC: 116 MMOL/L (ref 95–110)
CO2 SERPL-SCNC: 17 MMOL/L (ref 23–29)
CREAT SERPL-MCNC: 0.8 MG/DL (ref 0.5–1.4)
DIFFERENTIAL METHOD: ABNORMAL
E COLI SXT1 STL QL IA: NEGATIVE
E COLI SXT2 STL QL IA: NEGATIVE
EOSINOPHIL # BLD AUTO: 0.2 K/UL (ref 0–0.5)
EOSINOPHIL NFR BLD: 3.5 % (ref 0–8)
ERYTHROCYTE [DISTWIDTH] IN BLOOD BY AUTOMATED COUNT: 15.2 % (ref 11.5–14.5)
EST. GFR  (AFRICAN AMERICAN): >60 ML/MIN/1.73 M^2
EST. GFR  (NON AFRICAN AMERICAN): >60 ML/MIN/1.73 M^2
GLUCOSE SERPL-MCNC: 93 MG/DL (ref 70–110)
HCT VFR BLD AUTO: 30.2 % (ref 37–48.5)
HGB BLD-MCNC: 9.4 G/DL (ref 12–16)
IMM GRANULOCYTES # BLD AUTO: 0.01 K/UL (ref 0–0.04)
LYMPHOCYTES # BLD AUTO: 1.5 K/UL (ref 1–4.8)
LYMPHOCYTES NFR BLD: 27.5 % (ref 18–48)
MAGNESIUM SERPL-MCNC: 1.3 MG/DL (ref 1.6–2.6)
MCH RBC QN AUTO: 29.6 PG (ref 27–31)
MCHC RBC AUTO-ENTMCNC: 31.1 G/DL (ref 32–36)
MCV RBC AUTO: 95 FL (ref 82–98)
MONOCYTES # BLD AUTO: 0.5 K/UL (ref 0.3–1)
MONOCYTES NFR BLD: 9 % (ref 4–15)
NEUTROPHILS # BLD AUTO: 3.2 K/UL (ref 1.8–7.7)
NEUTROPHILS NFR BLD: 59.1 % (ref 38–73)
NRBC BLD-RTO: 0 /100 WBC
O+P STL TRI STN: NORMAL
PLATELET # BLD AUTO: 172 K/UL (ref 150–350)
PMV BLD AUTO: 9.6 FL (ref 9.2–12.9)
POTASSIUM SERPL-SCNC: 3.6 MMOL/L (ref 3.5–5.1)
PROT SERPL-MCNC: 4.9 G/DL (ref 6–8.4)
RBC # BLD AUTO: 3.18 M/UL (ref 4–5.4)
SODIUM SERPL-SCNC: 141 MMOL/L (ref 136–145)
WBC # BLD AUTO: 5.46 K/UL (ref 3.9–12.7)

## 2020-03-06 PROCEDURE — 94761 N-INVAS EAR/PLS OXIMETRY MLT: CPT

## 2020-03-06 PROCEDURE — 85025 COMPLETE CBC W/AUTO DIFF WBC: CPT

## 2020-03-06 PROCEDURE — 96361 HYDRATE IV INFUSION ADD-ON: CPT

## 2020-03-06 PROCEDURE — 25000003 PHARM REV CODE 250: Performed by: INTERNAL MEDICINE

## 2020-03-06 PROCEDURE — 36415 COLL VENOUS BLD VENIPUNCTURE: CPT

## 2020-03-06 PROCEDURE — 97530 THERAPEUTIC ACTIVITIES: CPT

## 2020-03-06 PROCEDURE — 63600175 PHARM REV CODE 636 W HCPCS: Performed by: NURSE PRACTITIONER

## 2020-03-06 PROCEDURE — C9113 INJ PANTOPRAZOLE SODIUM, VIA: HCPCS | Performed by: NURSE PRACTITIONER

## 2020-03-06 PROCEDURE — 83735 ASSAY OF MAGNESIUM: CPT

## 2020-03-06 PROCEDURE — 12000002 HC ACUTE/MED SURGE SEMI-PRIVATE ROOM

## 2020-03-06 PROCEDURE — 97161 PT EVAL LOW COMPLEX 20 MIN: CPT

## 2020-03-06 PROCEDURE — 25000003 PHARM REV CODE 250: Performed by: NURSE PRACTITIONER

## 2020-03-06 PROCEDURE — S4991 NICOTINE PATCH NONLEGEND: HCPCS | Performed by: NURSE PRACTITIONER

## 2020-03-06 PROCEDURE — 96376 TX/PRO/DX INJ SAME DRUG ADON: CPT

## 2020-03-06 PROCEDURE — 99900035 HC TECH TIME PER 15 MIN (STAT)

## 2020-03-06 PROCEDURE — 63600175 PHARM REV CODE 636 W HCPCS: Performed by: INTERNAL MEDICINE

## 2020-03-06 PROCEDURE — 97535 SELF CARE MNGMENT TRAINING: CPT

## 2020-03-06 PROCEDURE — 80053 COMPREHEN METABOLIC PANEL: CPT

## 2020-03-06 PROCEDURE — 97165 OT EVAL LOW COMPLEX 30 MIN: CPT

## 2020-03-06 RX ORDER — MAGNESIUM SULFATE HEPTAHYDRATE 40 MG/ML
2 INJECTION, SOLUTION INTRAVENOUS ONCE
Status: COMPLETED | OUTPATIENT
Start: 2020-03-06 | End: 2020-03-06

## 2020-03-06 RX ORDER — ENOXAPARIN SODIUM 100 MG/ML
40 INJECTION SUBCUTANEOUS
Status: DISCONTINUED | OUTPATIENT
Start: 2020-03-06 | End: 2020-03-12 | Stop reason: HOSPADM

## 2020-03-06 RX ORDER — L. ACIDOPHILUS/L.BULGARICUS 100MM CELL
1 GRANULES IN PACKET (EA) ORAL 2 TIMES DAILY
Status: DISCONTINUED | OUTPATIENT
Start: 2020-03-06 | End: 2020-03-10

## 2020-03-06 RX ORDER — MORPHINE SULFATE 4 MG/ML
4 INJECTION, SOLUTION INTRAMUSCULAR; INTRAVENOUS ONCE
Status: COMPLETED | OUTPATIENT
Start: 2020-03-07 | End: 2020-03-07

## 2020-03-06 RX ADMIN — ESCITALOPRAM OXALATE 20 MG: 10 TABLET, FILM COATED ORAL at 08:03

## 2020-03-06 RX ADMIN — TRAZODONE HYDROCHLORIDE 100 MG: 50 TABLET ORAL at 08:03

## 2020-03-06 RX ADMIN — HYDROCODONE BITARTRATE AND ACETAMINOPHEN 1 TABLET: 7.5; 325 TABLET ORAL at 08:03

## 2020-03-06 RX ADMIN — BACLOFEN 10 MG: 10 TABLET ORAL at 09:03

## 2020-03-06 RX ADMIN — Medication 800 MG: at 05:03

## 2020-03-06 RX ADMIN — HYDROCODONE BITARTRATE AND ACETAMINOPHEN 1 TABLET: 7.5; 325 TABLET ORAL at 05:03

## 2020-03-06 RX ADMIN — BUPROPION HYDROCHLORIDE 150 MG: 150 TABLET, EXTENDED RELEASE ORAL at 08:03

## 2020-03-06 RX ADMIN — GABAPENTIN 400 MG: 400 CAPSULE ORAL at 08:03

## 2020-03-06 RX ADMIN — Medication 800 MG: at 12:03

## 2020-03-06 RX ADMIN — OXYBUTYNIN CHLORIDE 10 MG: 5 TABLET, EXTENDED RELEASE ORAL at 09:03

## 2020-03-06 RX ADMIN — BENAZEPRIL HYDROCHLORIDE 20 MG: 10 TABLET, COATED ORAL at 08:03

## 2020-03-06 RX ADMIN — GABAPENTIN 400 MG: 400 CAPSULE ORAL at 09:03

## 2020-03-06 RX ADMIN — BACLOFEN 10 MG: 10 TABLET ORAL at 08:03

## 2020-03-06 RX ADMIN — BENAZEPRIL HYDROCHLORIDE 20 MG: 10 TABLET, COATED ORAL at 09:03

## 2020-03-06 RX ADMIN — Medication 500 MG: at 11:03

## 2020-03-06 RX ADMIN — ROSUVASTATIN CALCIUM 20 MG: 20 TABLET, FILM COATED ORAL at 09:03

## 2020-03-06 RX ADMIN — ALLOPURINOL 300 MG: 100 TABLET ORAL at 09:03

## 2020-03-06 RX ADMIN — Medication 500 MG: at 05:03

## 2020-03-06 RX ADMIN — LACTOBACILLUS ACIDOPHILUS / LACTOBACILLUS BULGARICUS 1 EACH: 100 MILLION CFU STRENGTH GRANULES at 11:03

## 2020-03-06 RX ADMIN — ENOXAPARIN SODIUM 40 MG: 100 INJECTION SUBCUTANEOUS at 05:03

## 2020-03-06 RX ADMIN — ONDANSETRON HYDROCHLORIDE 4 MG: 2 SOLUTION INTRAMUSCULAR; INTRAVENOUS at 07:03

## 2020-03-06 RX ADMIN — MAGNESIUM SULFATE IN WATER 2 G: 40 INJECTION, SOLUTION INTRAVENOUS at 09:03

## 2020-03-06 RX ADMIN — METOPROLOL TARTRATE 100 MG: 50 TABLET, FILM COATED ORAL at 08:03

## 2020-03-06 RX ADMIN — LACTOBACILLUS ACIDOPHILUS / LACTOBACILLUS BULGARICUS 1 EACH: 100 MILLION CFU STRENGTH GRANULES at 08:03

## 2020-03-06 RX ADMIN — CLOPIDOGREL BISULFATE 75 MG: 75 TABLET ORAL at 09:03

## 2020-03-06 RX ADMIN — METOPROLOL TARTRATE 100 MG: 50 TABLET, FILM COATED ORAL at 09:03

## 2020-03-06 RX ADMIN — SODIUM CHLORIDE: 0.9 INJECTION, SOLUTION INTRAVENOUS at 05:03

## 2020-03-06 RX ADMIN — HYDROCODONE BITARTRATE AND ACETAMINOPHEN 1 TABLET: 7.5; 325 TABLET ORAL at 11:03

## 2020-03-06 RX ADMIN — NICOTINE 1 PATCH: 14 PATCH, EXTENDED RELEASE TRANSDERMAL at 09:03

## 2020-03-06 RX ADMIN — PANTOPRAZOLE SODIUM 40 MG: 40 INJECTION, POWDER, LYOPHILIZED, FOR SOLUTION INTRAVENOUS at 09:03

## 2020-03-06 RX ADMIN — LEVOTHYROXINE SODIUM 75 MCG: 50 TABLET ORAL at 05:03

## 2020-03-06 NOTE — RESPIRATORY THERAPY
03/06/20 0812   Patient Assessment/Suction   Level of Consciousness (AVPU) alert   PRE-TX-O2   O2 Device (Oxygen Therapy) room air   SpO2 96 %   Pulse Oximetry Type Intermittent   $ Pulse Oximetry - Multiple Charge Pulse Oximetry - Multiple   Pulse 62   Resp 16   Aerosol Therapy   $ Aerosol Therapy Charges PRN treatment not required

## 2020-03-06 NOTE — PLAN OF CARE
Cm completed the assessment at pt's bedside. Pt is independent in care. PCP is Dr. Bradley.  Insurance verified as [employer].  Pt denies diabetes, dialysis and coumadin.  Pt is on Plavix.  Disposition:  Pt will discharge to home No needs verbalized at this time.       03/05/20 2179   Discharge Assessment   Assessment Type Discharge Planning Assessment   Confirmed/corrected address and phone number on facesheet? Yes   Assessment information obtained from? Patient   Communicated expected length of stay with patient/caregiver yes   Prior to hospitilization cognitive status: Alert/Oriented   Prior to hospitalization functional status: Independent   Current cognitive status: Alert/Oriented   Current Functional Status: Independent   Facility Arrived From: home   Lives With alone   Able to Return to Prior Arrangements yes   Is patient able to care for self after discharge? Yes   Who are your caregiver(s) and their phone number(s)? [facility] - [phone]   Patient's perception of discharge disposition home or selfcare   Readmission Within the Last 30 Days no previous admission in last 30 days   Patient currently being followed by outpatient case management? Yes   If yes, name of outpatient case management following: insurance company assigned oupatient case management   Patient currently receives any other outside agency services? No   Equipment Currently Used at Home none   Do you have any problems affording any of your prescribed medications? No   Is the patient taking medications as prescribed? yes   Does the patient have transportation home? Yes   Transportation Anticipated family or friend will provide   Dialysis Name and Scheduled days na   Does the patient receive services at the Coumadin Clinic? No  (Pt is on Plavix)   Discharge Plan A Home with family   DME Needed Upon Discharge  none   Patient/Family in Agreement with Plan yes

## 2020-03-06 NOTE — SUBJECTIVE & OBJECTIVE
Interval History:  Patient seen and examined.  Patient is afebrile.  Stool C diff PCR assay is positive.  Reports 3-4 loose watery bowel movements since midnight.  Patient reports generalized weakness.  Abdominal pain is mostly in the right side which has improved, crampy in nature.  Patient denies any chest pain or shortness of breath.    Review of Systems   Constitutional: Positive for activity change, appetite change and fatigue. Negative for chills and fever.   HENT: Negative for congestion, sinus pressure, sinus pain and sore throat.    Eyes: Negative for photophobia and visual disturbance.   Respiratory: Negative for cough, choking, chest tightness, shortness of breath and wheezing.    Cardiovascular: Positive for leg swelling. Negative for chest pain and palpitations.   Gastrointestinal: Positive for abdominal distention, abdominal pain, diarrhea and nausea. Negative for blood in stool, constipation and vomiting.   Genitourinary: Negative for difficulty urinating, dysuria, flank pain and hematuria.   Musculoskeletal: Positive for back pain, gait problem, joint swelling and myalgias.   Skin: Negative for rash and wound.   Neurological: Negative for dizziness, syncope, weakness, light-headedness, numbness and headaches.   Psychiatric/Behavioral: Negative for confusion. The patient is not nervous/anxious.      Objective:     Vital Signs (Most Recent):  Temp: 97.1 °F (36.2 °C) (03/06/20 0806)  Pulse: 62 (03/06/20 0812)  Resp: 16 (03/06/20 0812)  BP: 135/63 (03/06/20 0806)  SpO2: 96 % (03/06/20 0812) Vital Signs (24h Range):  Temp:  [96.7 °F (35.9 °C)-97.7 °F (36.5 °C)] 97.1 °F (36.2 °C)  Pulse:  [57-69] 62  Resp:  [16-18] 16  SpO2:  [95 %-98 %] 96 %  BP: (111-154)/(54-67) 135/63     Weight: 85 kg (187 lb 6.4 oz)(bed scale)  Body mass index is 29.35 kg/m².    Intake/Output Summary (Last 24 hours) at 3/6/2020 0921  Last data filed at 3/5/2020 1800  Gross per 24 hour   Intake 2217.5 ml   Output --   Net 2217.5 ml       Physical Exam   Constitutional: She is oriented to person, place, and time. She appears well-developed and well-nourished. No distress.   Chronically ill-appearing female   HENT:   Head: Normocephalic and atraumatic.   Eyes: Pupils are equal, round, and reactive to light. EOM are normal. Right eye exhibits no discharge. Left eye exhibits no discharge.   Neck: Normal range of motion. No JVD present.   Cardiovascular: Normal rate, regular rhythm, normal heart sounds and intact distal pulses.   No murmur heard.  Pulmonary/Chest: Effort normal and breath sounds normal. No respiratory distress. She has no wheezes. She has no rales. She exhibits no tenderness.   On room air   Abdominal: Soft. Bowel sounds are normal. She exhibits no distension. There is no tenderness. There is no rebound and no guarding.   Generalized soreness upon palpation but no definite tenderness to deep palpation or any peritoneal signs or guarding present.   Genitourinary:   Genitourinary Comments: Exam Deferred      Musculoskeletal: Normal range of motion. She exhibits edema. She exhibits no tenderness or deformity.   Mild right knee swelling pain on lateral aspect of patella   Neurological: She is alert and oriented to person, place, and time. No cranial nerve deficit or sensory deficit.   Skin: Skin is warm and dry. Capillary refill takes 2 to 3 seconds. No rash noted. She is not diaphoretic. No erythema.   Psychiatric: She has a normal mood and affect. Her behavior is normal. Judgment and thought content normal.   Nursing note and vitals reviewed.      Significant Labs:   Lab Results   Component Value Date    WBC 5.46 03/06/2020    HGB 9.4 (L) 03/06/2020    HCT 30.2 (L) 03/06/2020    MCV 95 03/06/2020     03/06/2020     BMP  Lab Results   Component Value Date     03/06/2020    K 3.6 03/06/2020     (H) 03/06/2020    CO2 17 (L) 03/06/2020    BUN 7 (L) 03/06/2020    CREATININE 0.8 03/06/2020    CALCIUM 9.3 03/06/2020     ANIONGAP 8 03/06/2020    ESTGFRAFRICA >60 03/06/2020    EGFRNONAA >60 03/06/2020     Microbiology Results (last 7 days)     Procedure Component Value Units Date/Time    C Diff Toxin by PCR [[id number]]  (Abnormal) Collected:  03/05/20 0823    Order Status:  Completed Updated:  03/05/20 2027     C. diff PCR Positive    E. coli 0157 antigen [[id number]] Collected:  03/05/20 0823    Order Status:  No result Specimen:  Stool Updated:  03/05/20 1638    Stool culture [[id number]] Collected:  03/05/20 0823    Order Status:  Sent Specimen:  Stool Updated:  03/05/20 1638    Clostridium difficile EIA [[id number]]  (Abnormal) Collected:  03/05/20 0823    Order Status:  Completed Specimen:  Stool Updated:  03/05/20 1149     C. diff Antigen Positive     C difficile Toxins A+B, EIA Negative     Comment: Testing not recommended for children <24 months old.           Significant Imaging:   RLE venous doppler scan:  No evidence of deep venous thrombosis in the right lower extremity.    CT abdomen stone protocol:  No evidence of nephrolithiasis or obstructive uropathy.  Fluid prominence of the small and large bowel loops with no transition point.  The findings may represent enterocolitis.  Suggest clinical correlation and follow-up.  Postoperative changes as above.  Colonic diverticula without evidence of acute diverticulitis.  Chronic intraluminal lipomas of the ascending and transverse colon.

## 2020-03-06 NOTE — PT/OT/SLP EVAL
"Physical Therapy Evaluation and Discharge Note         Patient Name:  Alessia Nelson   MRN:  [id number]    Recommendations:     Discharge Recommendations:  home   Discharge Equipment Recommendations: (has walker)   Barriers to discharge: None    Assessment:     Alessia Nelson is a 64 y.o. female admitted with a medical diagnosis of C. difficile colitis. .  At this time, patient is functioning at their prior level of function and does not require further acute PT services.     Recent Surgery: * No surgery found *      Plan:     During this hospitalization, patient does not require further acute PT services.  Please re-consult if situation changes.      Subjective     Subjective     Chief Complaint: R knee pain (states that has appt with ortho MD next week)  Patient Comments/goals: agrees to work with PT  Pain/Comfort:  · Pain Rating 1: 5/10  · Location - Side 1: Right  · Location 1: knee  · Pain Addressed 1: Reposition, Distraction, Cessation of Activity, Other (see comments)(rec'd to use cold pack to affrected area)  · Pain Rating Post-Intervention 1: 5/10    Living Environment:  House with 0 steps to enter  Prior to admission, patients level of function was independent, but with increased R knee pain since near fall ("squatted") ~ 4 weeks ago, and did lots of housework on Wednesday.  Equipment used at home: none.  DME owned (not currently used): rolling walker.    Upon discharge, patient will have assistance from lives alone, has neighbors.    Objective:     Communicated with nurse (Caprice) prior to session.  Patient found supine with peripheral IV  upon PT entry to room.    General Precautions: Standard, fall, contact   Orthopedic Precautions:    Braces: N/A     Exams:  · RLE Strength: WFL  · LLE Strength: WFL    Functional Mobility:  · Bed Mobility:     · Rolling Left:  modified independence  · Supine to Sit: modified independence  · Sit to Supine: modified independence  · Transfers:     · Sit to Stand:  " modified independence with no AD and rolling walker  · Gait: 200' with RW with mod I  ·   AM-PAC 6 CLICK MOBILITY  Total Score:24     Patient left supine with all lines intact and call button in reach.    GOALS:   Multidisciplinary Problems     Physical Therapy Goals     Not on file                History:     Past Medical History:   Diagnosis Date    Acute pancreatitis     CAD (coronary artery disease)     CHF (congestive heart failure)     COPD (chronic obstructive pulmonary disease)     Depression     Diverticulosis     Encounter for blood transfusion     GERD (gastroesophageal reflux disease)     History of blood clots     1986 AFTER BOWEL RESCETION    History of bowel resection     Hypertension     Peritonitis     Seizures     HAD A SEIZURE FROM PHENERGAN     Thyroid disease     TIA (transient ischemic attack)        Past Surgical History:   Procedure Laterality Date    ADRENAL GLAND SURGERY      APPENDECTOMY      BACK SURGERY      CHOLECYSTECTOMY      COLONOSCOPY      CORONARY STENT PLACEMENT      CYSTOURETHROSCOPY N/A 11/13/2019    Procedure: CYSTOURETHROSCOPY;  Surgeon: Shun Nuñez MD;  Location: [facility] OR;  Service: Urology;  Laterality: N/A;    ENDOSCOPIC ULTRASOUND OF UPPER GASTROINTESTINAL TRACT N/A 11/25/2019    Procedure: ULTRASOUND, UPPER GI TRACT, ENDOSCOPIC;  Surgeon: Alhaji Bridges MD;  Location: [facility] ([address] [place] [address]);  Service: Endoscopy;  Laterality: N/A;  5 day hold Plavix, Dr Jovanni Serrano - pg  PM prep    HERNIA REPAIR      HYSTERECTOMY      INCISIONAL HERNIA REPAIR      INJECTION OF ANESTHETIC AGENT AROUND NERVE Right 5/27/2019    Procedure: RIGHT L5-S3 MEDIAL BRANCH BLOCKS;  Surgeon: Sneha Aragon MD;  Location: [facility] OR;  Service: Pain Management;  Laterality: Right;  **DO NOT STOP PLAVIX**    instestine      KNEE ARTHROPLASTY Right 12/18/2019    Procedure: ARTHROPLASTY, KNEE;  Surgeon: Ike Briceño II, MD;  Location: [facility] OR;  Service: Orthopedics;   Laterality: Right;    KNEE SURGERY  1983    PARATHYROID GLAND SURGERY      3 surgeries    RADIOFREQUENCY ABLATION Right 6/10/2019    Procedure: Radiofrequency Ablation - RIGHT L3-5 RADIOFREQUENCY ABLATION WITH HALYARD COOLIEF THERMAL SYSTEM;  Surgeon: Sneha Aragon MD;  Location: [facility] OR;  Service: Pain Management;  Laterality: Right;  **HOLD PLAVIX x 7 DAYS PRIOR**    UPPER GASTROINTESTINAL ENDOSCOPY         Time Tracking:     PT Received On: 03/06/20  PT Start Time: 1015     PT Stop Time: 1035  PT Total Time (min): 20 min     Billable Minutes: Evaluation 20      Rudy Yoder, PT  03/06/2020

## 2020-03-06 NOTE — CARE UPDATE
03/05/20 2200   Patient Assessment/Suction   Level of Consciousness (AVPU) alert   Respiratory Effort Unlabored   Expansion/Accessory Muscles/Retractions no use of accessory muscles   PRE-TX-O2   O2 Device (Oxygen Therapy) room air   SpO2 96 %   Pulse Oximetry Type Intermittent   Pulse 69   Resp 16   Aerosol Therapy   $ Aerosol Therapy Charges PRN treatment not required

## 2020-03-06 NOTE — PROGRESS NOTES
[facility] Hospital Medicine  Progress Note    Patient Name: Alessia Nelson  MRN: [id number]  Patient Class: IP- Inpatient   Admission Date: 3/4/2020  Length of Stay: 0 days  Attending Physician: Carolynn Robin MD  Primary Care Provider: Cheryl Bradley NP        Subjective:     Principal Problem:C. difficile colitis        HPI:  Alessia Nelson is a 64-year-old female with a past medical history of hypertension, hypothyroidism, hyperlipidemia, GERD, insomnia, depression, coronary artery disease, and a TIA [facility]  Patient states that collectively she has been experiencing abdominal symptoms for the past month including nausea, diarrhea, and abdominal pain. 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  Patient states that last week she began to experience diarrhea, states that it has since slowed down.  Patient endorses right upper abdominal pain, that is worse with eating.  Patient states the pain is similar to her episodes of pancreatitis in the past.  Patient endorses decreased p.o. intake for the past 2 weeks.  Patient does endorse nausea, denies vomiting, states that she has been taking Zofran at home.  Patient endorses that her last bowel movement was yesterday, increased flatulence.  Patient also endorses that her urine looks like tea.  Patient states her last colonoscopy was in 2014, patient does endorse that she was diagnosed with gastric ulcers approximately 20 years ago.  Patient denies rectal bleeding, fever, alcohol use, new medication, or recent travel.  Patient also states that she recently underwent a right knee replacement in December of 2019 per Dr. Briceño.  Patient endorses right knee swelling and pain with movement and physical therapy.  Patient states today the pain became intolerable to the posterior aspect of her right knee after she was sweeping and mopping her  home.  Patient states I could barely walk.  In the emergency room initial workup revealed acute kidney injury with possible enterocolitis on the CT of abdomen and pelvis.  Patient was given 2 L IV normal saline in the emergency room.  Patient placed in observation on 03/04/2020 at approximately 10:30 p.m..  Patient not accompanied by any visitors during my initial interview or physical exam.  Patient states that she resides at home by herself, denies use of ambulatory assist devices or supplemental oxygen.      Overview/Hospital Course:  No notes on file    Interval History:  Patient seen and examined.  Patient is afebrile.  Stool C diff PCR assay is positive.  Reports 3-4 loose watery bowel movements since midnight.  Patient reports generalized weakness.  Abdominal pain is mostly in the right side which has improved, crampy in nature.  Patient denies any chest pain or shortness of breath.    Review of Systems   Constitutional: Positive for activity change, appetite change and fatigue. Negative for chills and fever.   HENT: Negative for congestion, sinus pressure, sinus pain and sore throat.    Eyes: Negative for photophobia and visual disturbance.   Respiratory: Negative for cough, choking, chest tightness, shortness of breath and wheezing.    Cardiovascular: Positive for leg swelling. Negative for chest pain and palpitations.   Gastrointestinal: Positive for abdominal distention, abdominal pain, diarrhea and nausea. Negative for blood in stool, constipation and vomiting.   Genitourinary: Negative for difficulty urinating, dysuria, flank pain and hematuria.   Musculoskeletal: Positive for back pain, gait problem, joint swelling and myalgias.   Skin: Negative for rash and wound.   Neurological: Negative for dizziness, syncope, weakness, light-headedness, numbness and headaches.   Psychiatric/Behavioral: Negative for confusion. The patient is not nervous/anxious.      Objective:     Vital Signs (Most  Recent):  Temp: 97.1 °F (36.2 °C) (03/06/20 0806)  Pulse: 62 (03/06/20 0812)  Resp: 16 (03/06/20 0812)  BP: 135/63 (03/06/20 0806)  SpO2: 96 % (03/06/20 0812) Vital Signs (24h Range):  Temp:  [96.7 °F (35.9 °C)-97.7 °F (36.5 °C)] 97.1 °F (36.2 °C)  Pulse:  [57-69] 62  Resp:  [16-18] 16  SpO2:  [95 %-98 %] 96 %  BP: (111-154)/(54-67) 135/63     Weight: 85 kg (187 lb 6.4 oz)(bed scale)  Body mass index is 29.35 kg/m².    Intake/Output Summary (Last 24 hours) at 3/6/2020 0921  Last data filed at 3/5/2020 1800  Gross per 24 hour   Intake 2217.5 ml   Output --   Net 2217.5 ml      Physical Exam   Constitutional: She is oriented to person, place, and time. She appears well-developed and well-nourished. No distress.   Chronically ill-appearing female   HENT:   Head: Normocephalic and atraumatic.   Eyes: Pupils are equal, round, and reactive to light. EOM are normal. Right eye exhibits no discharge. Left eye exhibits no discharge.   Neck: Normal range of motion. No JVD present.   Cardiovascular: Normal rate, regular rhythm, normal heart sounds and intact distal pulses.   No murmur heard.  Pulmonary/Chest: Effort normal and breath sounds normal. No respiratory distress. She has no wheezes. She has no rales. She exhibits no tenderness.   On room air   Abdominal: Soft. Bowel sounds are normal. She exhibits no distension. There is no tenderness. There is no rebound and no guarding.   Generalized soreness upon palpation but no definite tenderness to deep palpation or any peritoneal signs or guarding present.   Genitourinary:   Genitourinary Comments: Exam Deferred      Musculoskeletal: Normal range of motion. She exhibits edema. She exhibits no tenderness or deformity.   Mild right knee swelling pain on lateral aspect of patella   Neurological: She is alert and oriented to person, place, and time. No cranial nerve deficit or sensory deficit.   Skin: Skin is warm and dry. Capillary refill takes 2 to 3 seconds. No rash noted. She  is not diaphoretic. No erythema.   Psychiatric: She has a normal mood and affect. Her behavior is normal. Judgment and thought content normal.   Nursing note and vitals reviewed.      Significant Labs:   Lab Results   Component Value Date    WBC 5.46 03/06/2020    HGB 9.4 (L) 03/06/2020    HCT 30.2 (L) 03/06/2020    MCV 95 03/06/2020     03/06/2020     BMP  Lab Results   Component Value Date     03/06/2020    K 3.6 03/06/2020     (H) 03/06/2020    CO2 17 (L) 03/06/2020    BUN 7 (L) 03/06/2020    CREATININE 0.8 03/06/2020    CALCIUM 9.3 03/06/2020    ANIONGAP 8 03/06/2020    ESTGFRAFRICA >60 03/06/2020    EGFRNONAA >60 03/06/2020     Microbiology Results (last 7 days)     Procedure Component Value Units Date/Time    C Diff Toxin by PCR [[id number]]  (Abnormal) Collected:  03/05/20 0823    Order Status:  Completed Updated:  03/05/20 2027     C. diff PCR Positive    E. coli 0157 antigen [[id number]] Collected:  03/05/20 0823    Order Status:  No result Specimen:  Stool Updated:  03/05/20 1638    Stool culture [[id number]] Collected:  03/05/20 0823    Order Status:  Sent Specimen:  Stool Updated:  03/05/20 1638    Clostridium difficile EIA [[id number]]  (Abnormal) Collected:  03/05/20 0823    Order Status:  Completed Specimen:  Stool Updated:  03/05/20 1149     C. diff Antigen Positive     C difficile Toxins A+B, EIA Negative     Comment: Testing not recommended for children <24 months old.           Significant Imaging:   RLE venous doppler scan:  No evidence of deep venous thrombosis in the right lower extremity.    CT abdomen stone protocol:  No evidence of nephrolithiasis or obstructive uropathy.  Fluid prominence of the small and large bowel loops with no transition point.  The findings may represent enterocolitis.  Suggest clinical correlation and follow-up.  Postoperative changes as above.  Colonic diverticula without evidence of acute diverticulitis.  Chronic intraluminal lipomas of the ascending  and transverse colon.      Assessment/Plan:      * C. difficile colitis  Continue contact isolation.  Continue oral vancomycin antibiotic therapy.  Continue supportive care with gentle IV fluid hydration, electrolyte repletion.  Abdominal pain stable.  No abdominal distention noted.  Advanced to soft diet today.  Add Lactinex.      Hypomagnesemia  Magnesium reviewed-   Recent Labs   Lab 03/05/20  0522 03/05/20  2136 03/06/20  0637   MG <0.7* 1.4* 1.3*    Follow daily Mag level and replete as needed.  Avoid oral Magnesium.           Enterocolitis  Continue IV fluid hydration.  Stool studies, review of past medical records reveals history of C diff infection in 2013.  Symptomatic management.  Cipro and Flagyl for now.  C diff antigen positive but toxin negative, PCR in process.  If PCR is positive will change medications to p.o. vancomycin.    Normocytic anemia  Monitor and transfuse if patient becomes hemodynamically unstable or H/H < 7/21  Daily CBC.  No evidence of active or acute bleeding noted on my initial interview or physical exam.      Acute kidney injury  Resolved.  Reduce IV fluid hydration. Hold home hydrochlorothiazide.      Status post right knee replacement  Ultrasound of the right lower extremity was performed, negative for acute DVT.  Patient endorses swelling and pain with movement or physical therapy.  P.r.n. Norco for pain control.  Follow up with orthopedic surgeon upon discharge from hospital.  Continue PT and OT.    CAD (coronary artery disease)  Chronic, controlled.  Will continue home medication.  Continuous telemetry monitoring.      Chronic diarrhea  Noted      Hyperlipidemia  Chronic, controlled. Will continue home medication.    Lab Results   Component Value Date    CHOL 89 (L) 07/24/2019     Lab Results   Component Value Date    HDL 40 07/24/2019     Lab Results   Component Value Date    LDLCALC 8.0 (L) 07/24/2019     Lab Results   Component Value Date    TRIG 205 (H) 07/24/2019     Lab  Results   Component Value Date    CHOLHDL 44.9 07/24/2019               Hypothyroidism  Lab Results   Component Value Date    TSH 8.576 (H) 11/26/2019     Chronic, TSH elevated, check free T4.  Will continue home medication.      GERD (gastroesophageal reflux disease)  Chronic, controlled.  Continue PPI      Fibromyalgia  Chronic, controlled.  Will continue home medications.  Patient states she follows with pain management as an outpatient.      Mild episode of recurrent major depressive disorder  Chronic, controlled.  Will continue home medications.      Nicotine dependence  Smoking cessation counseling performed. Dangers of cigarette smoking were reviewed with patient in detail and patient was encouraged to quit. Nicotine replacement options were discussed for > 3 minutes.          COPD (chronic obstructive pulmonary disease)  Chronic, controlled.  Will continue home medication.  Duo-nebs as needed.       Patient encouraged to get out of bed, ambulate.  Likely DC home in 24-48 hours.    VTE Risk Mitigation (From admission, onward)         Ordered     enoxaparin injection 40 mg  Every 24 hours (non-standard times)      03/06/20 1242     IP VTE HIGH RISK PATIENT  Once      03/05/20 0223     Place ELENI hose  Until discontinued      03/05/20 0223     Place sequential compression device  Until discontinued      03/05/20 0223                      Carolynn Robin MD  Department of Hospital Medicine   [facility]

## 2020-03-06 NOTE — ASSESSMENT & PLAN NOTE
Magnesium reviewed-   Recent Labs   Lab 03/05/20  0522 03/05/20  2136 03/06/20  0637   MG <0.7* 1.4* 1.3*    Follow daily Mag level and replete as needed.  Avoid oral Magnesium.

## 2020-03-06 NOTE — PLAN OF CARE
Pt alert and oriented. No new symptoms. Still having diarrhea. Complaints of knee pain controlled with prn pain medications. Fluids infusing. Nicotine patch in place. Educated to call for assistance. Plan of care reviewed with patient. Safety maintained with shift. Will continue to monitor

## 2020-03-06 NOTE — ASSESSMENT & PLAN NOTE
Continue contact isolation.  Continue oral vancomycin antibiotic therapy.  Continue supportive care with gentle IV fluid hydration, electrolyte repletion.  Abdominal pain stable.  No abdominal distention noted.  Advanced to soft diet today.  Add Lactinex.

## 2020-03-06 NOTE — PLAN OF CARE
Problem: Occupational Therapy Goal  Goal: Occupational Therapy Goal  Description  Goals to be met by: 3/20/2020    Patient will increase functional independence with ADLs by performing:    LE Dressing with Modified [place].  Grooming while standing at sink with Modified [place].  Toileting from toilet with Modified [place] for hygiene and clothing management.   Supine to sit with Modified [place].  Toilet transfer to toilet with Modified [place].     Outcome: Ongoing, Progressing

## 2020-03-06 NOTE — PLAN OF CARE
Plan of care reviewed with patient. IV fluids & po antibiotics given as per orders. Medicated for pain three times this shift, moderate relief obtained. Tolerating clear liquid diet. Replaced magnesium as per orders. SR/SB on telemetry. Contact isolation maintained as per protocol .

## 2020-03-06 NOTE — ASSESSMENT & PLAN NOTE
Smoking cessation counseling performed. Dangers of cigarette smoking were reviewed with patient in detail and patient was encouraged to quit. Nicotine replacement options were discussed for > 3 minutes.

## 2020-03-06 NOTE — PLAN OF CARE
Plan of care reviewed with patient. Safety precautions maintained. Contact precautions maintained. Pt remains free from injury. Cardiac monitoring maintained. IV fluids infusing. Magnesium replaced. Pain & nausea controlled with prn medication. IV antibiotics initiated. Ambulates stand by assist. Refuses TEDs and SCDs on for VTE prevention. Frequent checks performed q2 hours. Neuro checks performed q2h. Tolerating clear liquid diet. Vital signs stable. Afebrile. AAOx4. Bed locked and low. Siderails raised x2. Non-skid socks on. Call light within reach.

## 2020-03-06 NOTE — NURSING
Called lab again to remind about Mag and T4 that needs to be drawn.  stated there is only one phlebotomist and she will call again to make sure she comes.

## 2020-03-06 NOTE — PT/OT/SLP EVAL
"Occupational Therapy   Evaluation and Discharge Note    Name: Alessia Nelson  MRN: [id number]  Admitting Diagnosis:  C. difficile colitis      Recommendations:     Discharge Recommendations: home  Discharge Equipment Recommendations:  none  Barriers to discharge:  None    Assessment:     Alessia Nelson is a 64 y.o. female with a medical diagnosis of C. difficile colitis. At this time, patient is modified independent with ADLs. Patient c/o R knee pain throughout evaluation but pain did not hinder ADL performance. No further acute OT needs.      Plan:     During this hospitalization, patient does not require further acute OT services.  Please re-consult if situation changes.    · Plan of Care Reviewed with: patient    Subjective     Chief Complaint: R knee pain  Patient/Family Comments/goals: "I'm in more pain now than I was 3 weeks after my R knee surgery."    Occupational Profile:  Living Environment: Patient lives alone with 1 dog.   Previous level of function: Patient was independent with ADLs and mobility  Equipment Used at home:  walker, rolling  Assistance upon Discharge: Patient will not require assist with ADLs or mobility.     Pain/Comfort:  · Pain Rating 1: 7/10  · Location - Side 1: Right  · Location - Orientation 1: generalized  · Location 1: knee  · Pain Addressed 1: Distraction, Cessation of Activity, Nurse notified, Pre-medicate for activity  · Pain Rating Post-Intervention 1: 7/10    Patients cultural, spiritual, Catholic conflicts given the current situation:      Objective:     Communicated with: nurse Vasques prior to session.  Patient found HOB elevated with telemetry, peripheral IV upon OT entry to room.    General Precautions: Standard, fall, contact   Orthopedic Precautions:N/A   Braces: N/A     Occupational Performance:    Bed Mobility:    · Patient completed Scooting/Bridging with modified independence  · Patient completed Supine to Sit with modified independence  · Patient completed Sit " to Supine with modified independence    Functional Mobility/Transfers:  · Patient completed Sit <> Stand Transfer with modified independence  with  rolling walker   · Patient completed Toilet Transfer Stand Pivot technique with modified independence with  rolling walker      Activities of Daily Living:  · Grooming: modified independence with oral and facial hygiene while standing at sink.  · Lower Body Dressing: modified independence to don/doff socks while seated in chair.    Cognitive/Visual Perceptual:  Cognitive/Psychosocial Skills:     -       Oriented to: x4   -       Follows Commands/attention:Follows multistep  commands  -       Communication: clear/fluent  -       Safety awareness/insight to disability: intact   -       Mood/Affect/Coping skills/emotional control: Appropriate to situation and Cooperative  Visual/Perceptual:      -Intact     Physical Exam:  Postural examination/scapula alignment:    -       Rounded shoulders  -       Forward head  Upper Extremity Range of Motion:     -       Right Upper Extremity: WFL  -       Left Upper Extremity: WFL  Upper Extremity Strength:    -       Right Upper Extremity: WFL  -       Left Upper Extremity: WFL   Strength:    -       Right Upper Extremity: WFL  -       Left Upper Extremity: WFL  Fine Motor Coordination:    -       Intact  Gross motor coordination:   WFL    AMPAC 6 Click ADL:  AMPAC Total Score: 22    Education:    Patient left HOB elevated with all lines intact, call button in reach and nurse notified    GOALS:   Multidisciplinary Problems     Occupational Therapy Goals        Problem: Occupational Therapy Goal    Goal Priority Disciplines Outcome Interventions   Occupational Therapy Goal     OT, PT/OT Ongoing, Progressing    Description:  Goals to be met by: 3/20/2020    Patient will increase functional independence with ADLs by performing:    LE Dressing with Modified [place].  Grooming while standing at sink with Modified  [place].  Toileting from toilet with Modified [place] for hygiene and clothing management.   Supine to sit with Modified [place].  Toilet transfer to toilet with Modified [place].                      History:     Past Medical History:   Diagnosis Date    Acute pancreatitis     CAD (coronary artery disease)     CHF (congestive heart failure)     COPD (chronic obstructive pulmonary disease)     Depression     Diverticulosis     Encounter for blood transfusion     GERD (gastroesophageal reflux disease)     History of blood clots     1986 AFTER BOWEL RESCETION    History of bowel resection     Hypertension     Peritonitis     Seizures     HAD A SEIZURE FROM PHENERGAN     Thyroid disease     TIA (transient ischemic attack)        Past Surgical History:   Procedure Laterality Date    ADRENAL GLAND SURGERY      APPENDECTOMY      BACK SURGERY      CHOLECYSTECTOMY      COLONOSCOPY      CORONARY STENT PLACEMENT      CYSTOURETHROSCOPY N/A 11/13/2019    Procedure: CYSTOURETHROSCOPY;  Surgeon: Shun Nuñez MD;  Location: [facility] OR;  Service: Urology;  Laterality: N/A;    ENDOSCOPIC ULTRASOUND OF UPPER GASTROINTESTINAL TRACT N/A 11/25/2019    Procedure: ULTRASOUND, UPPER GI TRACT, ENDOSCOPIC;  Surgeon: Alhaji Bridges MD;  Location: [facility] ENDO ([address] [place] [address]);  Service: Endoscopy;  Laterality: N/A;  5 day hold Plavix, Dr Jovanni Serrano - pg  PM prep    HERNIA REPAIR      HYSTERECTOMY      INCISIONAL HERNIA REPAIR      INJECTION OF ANESTHETIC AGENT AROUND NERVE Right 5/27/2019    Procedure: RIGHT L5-S3 MEDIAL BRANCH BLOCKS;  Surgeon: Sneha Aragon MD;  Location: [facility] OR;  Service: Pain Management;  Laterality: Right;  **DO NOT STOP PLAVIX**    instestine      KNEE ARTHROPLASTY Right 12/18/2019    Procedure: ARTHROPLASTY, KNEE;  Surgeon: Ike Briceño II, MD;  Location: [facility] OR;  Service: Orthopedics;  Laterality: Right;    KNEE SURGERY  1983    PARATHYROID GLAND SURGERY      3  surgeries    RADIOFREQUENCY ABLATION Right 6/10/2019    Procedure: Radiofrequency Ablation - RIGHT L3-5 RADIOFREQUENCY ABLATION WITH HALYARD COOLIEF THERMAL SYSTEM;  Surgeon: Sneha Aragon MD;  Location: [facility] OR;  Service: Pain Management;  Laterality: Right;  **HOLD PLAVIX x 7 DAYS PRIOR**    UPPER GASTROINTESTINAL ENDOSCOPY         Time Tracking:     OT Date of Treatment: 03/06/20  OT Start Time: 0919  OT Stop Time: 0946  OT Total Time (min): 27 min    Billable Minutes:Evaluation 10  Self Care/Home Management 9  Therapeutic Activity 8    Carlos Kat, OT  3/6/2020

## 2020-03-06 NOTE — RESPIRATORY THERAPY
[facility] [place] [facility] left with patient. Nicotine patch in place.

## 2020-03-06 NOTE — ASSESSMENT & PLAN NOTE
Ultrasound of the right lower extremity was performed, negative for acute DVT.  Patient endorses swelling and pain with movement or physical therapy.  P.r.n. Norco for pain control.  Follow up with orthopedic surgeon upon discharge from hospital.  Continue PT and OT.

## 2020-03-07 LAB
ALBUMIN SERPL BCP-MCNC: 2.6 G/DL (ref 3.5–5.2)
ALP SERPL-CCNC: 100 U/L (ref 55–135)
ALT SERPL W/O P-5'-P-CCNC: 10 U/L (ref 10–44)
ANION GAP SERPL CALC-SCNC: 4 MMOL/L (ref 8–16)
AST SERPL-CCNC: 10 U/L (ref 10–40)
BASOPHILS # BLD AUTO: 0.02 K/UL (ref 0–0.2)
BASOPHILS NFR BLD: 0.3 % (ref 0–1.9)
BILIRUB SERPL-MCNC: 0.2 MG/DL (ref 0.1–1)
BUN SERPL-MCNC: 5 MG/DL (ref 8–23)
CALCIUM SERPL-MCNC: 9.6 MG/DL (ref 8.7–10.5)
CHLORIDE SERPL-SCNC: 115 MMOL/L (ref 95–110)
CO2 SERPL-SCNC: 23 MMOL/L (ref 23–29)
CREAT SERPL-MCNC: 0.8 MG/DL (ref 0.5–1.4)
DIFFERENTIAL METHOD: ABNORMAL
EOSINOPHIL # BLD AUTO: 0.3 K/UL (ref 0–0.5)
EOSINOPHIL NFR BLD: 4.3 % (ref 0–8)
ERYTHROCYTE [DISTWIDTH] IN BLOOD BY AUTOMATED COUNT: 15.4 % (ref 11.5–14.5)
EST. GFR  (AFRICAN AMERICAN): >60 ML/MIN/1.73 M^2
EST. GFR  (NON AFRICAN AMERICAN): >60 ML/MIN/1.73 M^2
GLUCOSE SERPL-MCNC: 139 MG/DL (ref 70–110)
HCT VFR BLD AUTO: 29.8 % (ref 37–48.5)
HGB BLD-MCNC: 9.4 G/DL (ref 12–16)
IMM GRANULOCYTES # BLD AUTO: 0.02 K/UL (ref 0–0.04)
LYMPHOCYTES # BLD AUTO: 1.7 K/UL (ref 1–4.8)
LYMPHOCYTES NFR BLD: 26.9 % (ref 18–48)
MAGNESIUM SERPL-MCNC: 1.3 MG/DL (ref 1.6–2.6)
MCH RBC QN AUTO: 29.7 PG (ref 27–31)
MCHC RBC AUTO-ENTMCNC: 31.5 G/DL (ref 32–36)
MCV RBC AUTO: 94 FL (ref 82–98)
MONOCYTES # BLD AUTO: 0.5 K/UL (ref 0.3–1)
MONOCYTES NFR BLD: 8.4 % (ref 4–15)
NEUTROPHILS # BLD AUTO: 3.9 K/UL (ref 1.8–7.7)
NEUTROPHILS NFR BLD: 59.8 % (ref 38–73)
NRBC BLD-RTO: 0 /100 WBC
PLATELET # BLD AUTO: 189 K/UL (ref 150–350)
PMV BLD AUTO: 9.4 FL (ref 9.2–12.9)
POTASSIUM SERPL-SCNC: 3.8 MMOL/L (ref 3.5–5.1)
PROT SERPL-MCNC: 4.8 G/DL (ref 6–8.4)
RBC # BLD AUTO: 3.17 M/UL (ref 4–5.4)
SODIUM SERPL-SCNC: 142 MMOL/L (ref 136–145)
WBC # BLD AUTO: 6.44 K/UL (ref 3.9–12.7)

## 2020-03-07 PROCEDURE — 12000002 HC ACUTE/MED SURGE SEMI-PRIVATE ROOM

## 2020-03-07 PROCEDURE — C9113 INJ PANTOPRAZOLE SODIUM, VIA: HCPCS | Performed by: NURSE PRACTITIONER

## 2020-03-07 PROCEDURE — 25000003 PHARM REV CODE 250: Performed by: INTERNAL MEDICINE

## 2020-03-07 PROCEDURE — 85025 COMPLETE CBC W/AUTO DIFF WBC: CPT

## 2020-03-07 PROCEDURE — S4991 NICOTINE PATCH NONLEGEND: HCPCS | Performed by: NURSE PRACTITIONER

## 2020-03-07 PROCEDURE — 63600175 PHARM REV CODE 636 W HCPCS: Performed by: INTERNAL MEDICINE

## 2020-03-07 PROCEDURE — 99900035 HC TECH TIME PER 15 MIN (STAT)

## 2020-03-07 PROCEDURE — 36415 COLL VENOUS BLD VENIPUNCTURE: CPT

## 2020-03-07 PROCEDURE — 63600175 PHARM REV CODE 636 W HCPCS: Performed by: NURSE PRACTITIONER

## 2020-03-07 PROCEDURE — 25000003 PHARM REV CODE 250: Performed by: NURSE PRACTITIONER

## 2020-03-07 PROCEDURE — 83735 ASSAY OF MAGNESIUM: CPT

## 2020-03-07 PROCEDURE — 80053 COMPREHEN METABOLIC PANEL: CPT

## 2020-03-07 PROCEDURE — 94761 N-INVAS EAR/PLS OXIMETRY MLT: CPT

## 2020-03-07 RX ORDER — MORPHINE SULFATE 4 MG/ML
4 INJECTION, SOLUTION INTRAMUSCULAR; INTRAVENOUS ONCE
Status: COMPLETED | OUTPATIENT
Start: 2020-03-07 | End: 2020-03-07

## 2020-03-07 RX ADMIN — ALLOPURINOL 300 MG: 100 TABLET ORAL at 10:03

## 2020-03-07 RX ADMIN — MORPHINE SULFATE 4 MG: 4 INJECTION, SOLUTION INTRAMUSCULAR; INTRAVENOUS at 12:03

## 2020-03-07 RX ADMIN — TRAZODONE HYDROCHLORIDE 100 MG: 50 TABLET ORAL at 08:03

## 2020-03-07 RX ADMIN — GABAPENTIN 400 MG: 400 CAPSULE ORAL at 10:03

## 2020-03-07 RX ADMIN — SODIUM CHLORIDE: 0.9 INJECTION, SOLUTION INTRAVENOUS at 05:03

## 2020-03-07 RX ADMIN — OXYBUTYNIN CHLORIDE 10 MG: 5 TABLET, EXTENDED RELEASE ORAL at 10:03

## 2020-03-07 RX ADMIN — HYDROCODONE BITARTRATE AND ACETAMINOPHEN 1 TABLET: 7.5; 325 TABLET ORAL at 11:03

## 2020-03-07 RX ADMIN — BENAZEPRIL HYDROCHLORIDE 20 MG: 10 TABLET, COATED ORAL at 08:03

## 2020-03-07 RX ADMIN — ONDANSETRON HYDROCHLORIDE 4 MG: 2 SOLUTION INTRAMUSCULAR; INTRAVENOUS at 07:03

## 2020-03-07 RX ADMIN — HYDROCODONE BITARTRATE AND ACETAMINOPHEN 1 TABLET: 7.5; 325 TABLET ORAL at 05:03

## 2020-03-07 RX ADMIN — NICOTINE 1 PATCH: 14 PATCH, EXTENDED RELEASE TRANSDERMAL at 10:03

## 2020-03-07 RX ADMIN — MORPHINE SULFATE 4 MG: 4 INJECTION, SOLUTION INTRAMUSCULAR; INTRAVENOUS at 11:03

## 2020-03-07 RX ADMIN — Medication 500 MG: at 11:03

## 2020-03-07 RX ADMIN — LEVOTHYROXINE SODIUM 75 MCG: 50 TABLET ORAL at 05:03

## 2020-03-07 RX ADMIN — ROSUVASTATIN CALCIUM 20 MG: 20 TABLET, FILM COATED ORAL at 10:03

## 2020-03-07 RX ADMIN — PANTOPRAZOLE SODIUM 40 MG: 40 INJECTION, POWDER, LYOPHILIZED, FOR SOLUTION INTRAVENOUS at 10:03

## 2020-03-07 RX ADMIN — LACTOBACILLUS ACIDOPHILUS / LACTOBACILLUS BULGARICUS 1 EACH: 100 MILLION CFU STRENGTH GRANULES at 08:03

## 2020-03-07 RX ADMIN — ONDANSETRON 8 MG: 2 INJECTION INTRAMUSCULAR; INTRAVENOUS at 11:03

## 2020-03-07 RX ADMIN — BUPROPION HYDROCHLORIDE 150 MG: 150 TABLET, EXTENDED RELEASE ORAL at 08:03

## 2020-03-07 RX ADMIN — BENAZEPRIL HYDROCHLORIDE 20 MG: 10 TABLET, COATED ORAL at 10:03

## 2020-03-07 RX ADMIN — BACLOFEN 10 MG: 10 TABLET ORAL at 10:03

## 2020-03-07 RX ADMIN — GABAPENTIN 400 MG: 400 CAPSULE ORAL at 08:03

## 2020-03-07 RX ADMIN — BACLOFEN 10 MG: 10 TABLET ORAL at 08:03

## 2020-03-07 RX ADMIN — ESCITALOPRAM OXALATE 20 MG: 10 TABLET, FILM COATED ORAL at 08:03

## 2020-03-07 RX ADMIN — HYDROCODONE BITARTRATE AND ACETAMINOPHEN 1 TABLET: 7.5; 325 TABLET ORAL at 07:03

## 2020-03-07 RX ADMIN — METOPROLOL TARTRATE 100 MG: 50 TABLET, FILM COATED ORAL at 08:03

## 2020-03-07 RX ADMIN — CLOPIDOGREL BISULFATE 75 MG: 75 TABLET ORAL at 10:03

## 2020-03-07 RX ADMIN — LACTOBACILLUS ACIDOPHILUS / LACTOBACILLUS BULGARICUS 1 EACH: 100 MILLION CFU STRENGTH GRANULES at 10:03

## 2020-03-07 RX ADMIN — Medication 500 MG: at 06:03

## 2020-03-07 RX ADMIN — ENOXAPARIN SODIUM 40 MG: 100 INJECTION SUBCUTANEOUS at 05:03

## 2020-03-07 RX ADMIN — Medication 500 MG: at 05:03

## 2020-03-07 NOTE — SUBJECTIVE & OBJECTIVE
Interval History: stable    Review of Systems   Unable to perform ROS: Dementia   Constitutional: Negative for diaphoresis and fever.   HENT: Negative for rhinorrhea and trouble swallowing.    Eyes: Negative for pain and redness.   Respiratory: Negative for apnea and cough.    Cardiovascular: Negative for chest pain and leg swelling.   Gastrointestinal: Positive for diarrhea. Negative for abdominal distention and abdominal pain.   Endocrine: Negative for cold intolerance.   Genitourinary: Negative for difficulty urinating and dysuria.   Musculoskeletal: Negative for arthralgias and back pain.   Skin: Negative for pallor and rash.   Neurological: Negative for seizures and facial asymmetry.   Psychiatric/Behavioral: Negative for agitation and behavioral problems.     Objective:     Vital Signs (Most Recent):  Temp: 97.5 °F (36.4 °C) (03/07/20 1558)  Pulse: 60 (03/07/20 1558)  Resp: 18 (03/07/20 1558)  BP: (!) 163/71 (03/07/20 1558)  SpO2: (!) 94 % (03/07/20 1558) Vital Signs (24h Range):  Temp:  [97 °F (36.1 °C)-98.6 °F (37 °C)] 97.5 °F (36.4 °C)  Pulse:  [54-61] 60  Resp:  [18-20] 18  SpO2:  [94 %-97 %] 94 %  BP: (131-172)/(61-74) 163/71     Weight: 85 kg (187 lb 6.4 oz)(bed scale)  Body mass index is 29.35 kg/m².    Intake/Output Summary (Last 24 hours) at 3/7/2020 1654  Last data filed at 3/7/2020 1330  Gross per 24 hour   Intake 4665 ml   Output --   Net 4665 ml      Physical Exam   Constitutional: She is oriented to person, place, and time. No distress.   HENT:   Head: Normocephalic and atraumatic.   Eyes: Conjunctivae are normal. No scleral icterus.   Neck: No JVD present. No tracheal deviation present.   Cardiovascular: Normal rate and regular rhythm.   Pulmonary/Chest: Effort normal and breath sounds normal.   Abdominal: She exhibits no distension. There is no tenderness.   Musculoskeletal: Normal range of motion. She exhibits no deformity.   Neurological: She is alert and oriented to person, place, and time.    Skin: Skin is warm and dry. She is not diaphoretic.   Psychiatric: She has a normal mood and affect. Her behavior is normal.       Significant Labs: All pertinent labs within the past 24 hours have been reviewed.    Significant Imaging: I have reviewed all pertinent imaging results/findings within the past 24 hours.

## 2020-03-07 NOTE — PLAN OF CARE
Plan of care reviewed with pt, pt verbalized understanding. IV site clean, dry, intact, no redness or swelling noted. Pt ambulated to bathroom multiple times this shift, remains free of fall/trauma. VSS, pt remained afebrile this shift. Contact precautions maintained. Purposeful q2h rounding done throughout shift, safety maintained, call light in reach, bed locked and in lowest position, side rails up x2. Will continue to monitor, observe and report any changes.

## 2020-03-07 NOTE — RESPIRATORY THERAPY
03/06/20 1947   Patient Assessment/Suction   Level of Consciousness (AVPU) alert   Respiratory Effort Unlabored   Expansion/Accessory Muscles/Retractions no use of accessory muscles   All Lung Fields Breath Sounds diminished;clear   PRE-TX-O2   O2 Device (Oxygen Therapy) room air   SpO2 95 %   Pulse Oximetry Type Intermittent   $ Pulse Oximetry - Multiple Charge Pulse Oximetry - Multiple   Aerosol Therapy   $ Aerosol Therapy Charges PRN treatment not required

## 2020-03-07 NOTE — RESPIRATORY THERAPY
03/07/20 0819   Patient Assessment/Suction   Level of Consciousness (AVPU) alert   PRE-TX-O2   O2 Device (Oxygen Therapy) room air   SpO2 97 %   Pulse Oximetry Type Intermittent   $ Pulse Oximetry - Multiple Charge Pulse Oximetry - Multiple   Aerosol Therapy   $ Aerosol Therapy Charges PRN treatment not required

## 2020-03-07 NOTE — PROGRESS NOTES
[facility] Hospital Medicine  Progress Note    Patient Name: lAessia Nelson  MRN: [id number]  Patient Class: IP- Inpatient   Admission Date: 3/4/2020  Length of Stay: 1 days  Attending Physician: Aren Stewart MD  Primary Care Provider: Cheryl Bradley NP        Subjective:     Principal Problem:C. difficile colitis        HPI:  Alessia Nelson is a 64-year-old female with a past medical history of hypertension, hypothyroidism, hyperlipidemia, GERD, insomnia, depression, coronary artery disease, and a TIA [facility]  Patient states that collectively she has been experiencing abdominal symptoms for the past month including nausea, diarrhea, and abdominal pain. 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  Patient states that last week she began to experience diarrhea, states that it has since slowed down.  Patient endorses right upper abdominal pain, that is worse with eating.  Patient states the pain is similar to her episodes of pancreatitis in the past.  Patient endorses decreased p.o. intake for the past 2 weeks.  Patient does endorse nausea, denies vomiting, states that she has been taking Zofran at home.  Patient endorses that her last bowel movement was yesterday, increased flatulence.  Patient also endorses that her urine looks like tea.  Patient states her last colonoscopy was in 2014, patient does endorse that she was diagnosed with gastric ulcers approximately 20 years ago.  Patient denies rectal bleeding, fever, alcohol use, new medication, or recent travel.  Patient also states that she recently underwent a right knee replacement in December of 2019 per Dr. Briceño.  Patient endorses right knee swelling and pain with movement and physical therapy.  Patient states today the pain became intolerable to the posterior aspect of her right knee after she was sweeping and mopping  her home.  Patient states I could barely walk.  In the emergency room initial workup revealed acute kidney injury with possible enterocolitis on the CT of abdomen and pelvis.  Patient was given 2 L IV normal saline in the emergency room.  Patient placed in observation on 03/04/2020 at approximately 10:30 p.m..  Patient not accompanied by any visitors during my initial interview or physical exam.  Patient states that she resides at home by herself, denies use of ambulatory assist devices or supplemental oxygen.      Overview/Hospital Course:  No notes on file    Interval History: stable    Review of Systems   Unable to perform ROS: Dementia   Endocrine: Positive for cold intolerance.     Objective:     Vital Signs (Most Recent):  Temp: 97.5 °F (36.4 °C) (03/07/20 1558)  Pulse: 60 (03/07/20 1558)  Resp: 18 (03/07/20 1558)  BP: (!) 163/71 (03/07/20 1558)  SpO2: (!) 94 % (03/07/20 1558) Vital Signs (24h Range):  Temp:  [97 °F (36.1 °C)-98.6 °F (37 °C)] 97.5 °F (36.4 °C)  Pulse:  [54-61] 60  Resp:  [18-20] 18  SpO2:  [94 %-97 %] 94 %  BP: (131-172)/(61-74) 163/71     Weight: 85 kg (187 lb 6.4 oz)(bed scale)  Body mass index is 29.35 kg/m².    Intake/Output Summary (Last 24 hours) at 3/7/2020 1654  Last data filed at 3/7/2020 1330  Gross per 24 hour   Intake 4665 ml   Output --   Net 4665 ml      Physical Exam   Constitutional: She is oriented to person, place, and time. No distress.   HENT:   Head: Normocephalic and atraumatic.   Eyes: Conjunctivae are normal. No scleral icterus.   Neck: No JVD present. No tracheal deviation present.   Cardiovascular: Normal rate and regular rhythm.   Pulmonary/Chest: Effort normal and breath sounds normal.   Abdominal: She exhibits no distension. There is no tenderness.   Musculoskeletal: Normal range of motion. She exhibits no deformity.   Neurological: She is alert and oriented to person, place, and time.   Skin: Skin is warm and dry. She is not diaphoretic.   Psychiatric: She has a  normal mood and affect. Her behavior is normal.       Significant Labs: All pertinent labs within the past 24 hours have been reviewed.    Significant Imaging: I have reviewed all pertinent imaging results/findings within the past 24 hours.      Assessment/Plan:      * C. difficile colitis  Continue contact isolation.  Continue oral vancomycin antibiotic therapy.  Continue supportive care with gentle IV fluid hydration, electrolyte repletion.  Abdominal pain stable.  No abdominal distention noted.  Advanced to soft diet today.  Add Lactinex.      Hypomagnesemia  Magnesium reviewed-   Recent Labs   Lab 03/05/20  0522 03/05/20  2136 03/06/20  0637   MG <0.7* 1.4* 1.3*    Follow daily Mag level and replete as needed.  Avoid oral Magnesium.           Enterocolitis  Continue IV fluid hydration.  Stool studies, review of past medical records reveals history of C diff infection in 2013.  Symptomatic management.  Cipro and Flagyl for now.  C diff antigen positive but toxin negative, PCR in process.  If PCR is positive will change medications to p.o. vancomycin.    Normocytic anemia  Monitor and transfuse if patient becomes hemodynamically unstable or H/H < 7/21  Daily CBC.  No evidence of active or acute bleeding noted on my initial interview or physical exam.      Acute kidney injury  Resolved.  Reduce IV fluid hydration. Hold home hydrochlorothiazide.      Status post right knee replacement  Ultrasound of the right lower extremity was performed, negative for acute DVT.  Patient endorses swelling and pain with movement or physical therapy.  P.r.n. Norco for pain control.  Follow up with orthopedic surgeon upon discharge from hospital.  Continue PT and OT.    CAD (coronary artery disease)  Chronic, controlled.  Will continue home medication.  Continuous telemetry monitoring.      Chronic diarrhea  Noted      Hyperlipidemia  Chronic, controlled. Will continue home medication.    Lab Results   Component Value Date    CHOL 89  (L) 07/24/2019     Lab Results   Component Value Date    HDL 40 07/24/2019     Lab Results   Component Value Date    LDLCALC 8.0 (L) 07/24/2019     Lab Results   Component Value Date    TRIG 205 (H) 07/24/2019     Lab Results   Component Value Date    CHOLHDL 44.9 07/24/2019               Hypothyroidism  Lab Results   Component Value Date    TSH 8.576 (H) 11/26/2019     Chronic, TSH elevated, check free T4.  Will continue home medication.      GERD (gastroesophageal reflux disease)  Chronic, controlled.  Continue PPI      Fibromyalgia  Chronic, controlled.  Will continue home medications.  Patient states she follows with pain management as an outpatient.      Mild episode of recurrent major depressive disorder  Chronic, controlled.  Will continue home medications.      Nicotine dependence  Smoking cessation counseling performed. Dangers of cigarette smoking were reviewed with patient in detail and patient was encouraged to quit. Nicotine replacement options were discussed for > 3 minutes.          COPD (chronic obstructive pulmonary disease)  Chronic, controlled.  Will continue home medication.  Duo-nebs as needed.        VTE Risk Mitigation (From admission, onward)         Ordered     enoxaparin injection 40 mg  Every 24 hours (non-standard times)      03/06/20 1242     IP VTE HIGH RISK PATIENT  Once      03/05/20 0223     Place ELENI hose  Until discontinued      03/05/20 0223     Place sequential compression device  Until discontinued      03/05/20 0223                      Aren Stewart MD  Department of Hospital Medicine   [facility]

## 2020-03-07 NOTE — NURSING
Situation-   Who are you? Who is the patient? What is going on with the patient currently?         I am Quique Gardner  from [facility]  in regards to Alessia Nelson who is a 64 y.o. year old female admitted with C. difficile colitis who is complaining of severe leg pain. Had a knee replacement with Dr. Briceño December 2019. Pt received hydrocodone at 2026, receives pain medicine q6h. Pt states she was given a one time dose of morphine and it helped last night. Do you think we should order morphine or toradol PRN to help with pain? What do you recommend?   Background- What is the patient's significant medical history (CAD, ESRD, HIV positive, history of recent surgery/chemo.transfusion) and recent labs Has a past medical history of   Past Medical History:   Diagnosis Date    Acute pancreatitis     CAD (coronary artery disease)     CHF (congestive heart failure)     COPD (chronic obstructive pulmonary disease)     Depression     Diverticulosis     Encounter for blood transfusion     GERD (gastroesophageal reflux disease)     History of blood clots     1986 AFTER BOWEL RESCETION    History of bowel resection     Hypertension     Peritonitis     Seizures     HAD A SEIZURE FROM PHENERGAN     Thyroid disease     TIA (transient ischemic attack)      Vitals:    03/06/20 2337   BP: (!) 147/67   Pulse: 60   Resp: 18   Temp: 97 °F (36.1 °C)            Assessment-   Has the following issues (abnormal labs, abnormal vitals, change in status, uncontrolled symptoms, patient request) Who is having severe leg pain   Recommendation- What is the change in the plan of care requested? Do you think we should order morphine or toradol PRN to help with pain?   Plan-  What did we decide to do? Orders placed

## 2020-03-08 LAB
ALBUMIN SERPL BCP-MCNC: 2.5 G/DL (ref 3.5–5.2)
ALP SERPL-CCNC: 101 U/L (ref 55–135)
ALT SERPL W/O P-5'-P-CCNC: 11 U/L (ref 10–44)
ANION GAP SERPL CALC-SCNC: 4 MMOL/L (ref 8–16)
AST SERPL-CCNC: 11 U/L (ref 10–40)
BASOPHILS # BLD AUTO: 0.02 K/UL (ref 0–0.2)
BASOPHILS NFR BLD: 0.3 % (ref 0–1.9)
BILIRUB SERPL-MCNC: 0.2 MG/DL (ref 0.1–1)
BUN SERPL-MCNC: 5 MG/DL (ref 8–23)
CALCIUM SERPL-MCNC: 9.4 MG/DL (ref 8.7–10.5)
CHLORIDE SERPL-SCNC: 115 MMOL/L (ref 95–110)
CO2 SERPL-SCNC: 24 MMOL/L (ref 23–29)
CREAT SERPL-MCNC: 0.8 MG/DL (ref 0.5–1.4)
DIFFERENTIAL METHOD: ABNORMAL
EOSINOPHIL # BLD AUTO: 0.3 K/UL (ref 0–0.5)
EOSINOPHIL NFR BLD: 4.3 % (ref 0–8)
ERYTHROCYTE [DISTWIDTH] IN BLOOD BY AUTOMATED COUNT: 15.3 % (ref 11.5–14.5)
EST. GFR  (AFRICAN AMERICAN): >60 ML/MIN/1.73 M^2
EST. GFR  (NON AFRICAN AMERICAN): >60 ML/MIN/1.73 M^2
GLUCOSE SERPL-MCNC: 94 MG/DL (ref 70–110)
HCT VFR BLD AUTO: 30.3 % (ref 37–48.5)
HGB BLD-MCNC: 9.4 G/DL (ref 12–16)
IMM GRANULOCYTES # BLD AUTO: 0.02 K/UL (ref 0–0.04)
LYMPHOCYTES # BLD AUTO: 1.9 K/UL (ref 1–4.8)
LYMPHOCYTES NFR BLD: 28 % (ref 18–48)
MAGNESIUM SERPL-MCNC: 1 MG/DL (ref 1.6–2.6)
MCH RBC QN AUTO: 29.3 PG (ref 27–31)
MCHC RBC AUTO-ENTMCNC: 31 G/DL (ref 32–36)
MCV RBC AUTO: 94 FL (ref 82–98)
MONOCYTES # BLD AUTO: 0.5 K/UL (ref 0.3–1)
MONOCYTES NFR BLD: 7.5 % (ref 4–15)
NEUTROPHILS # BLD AUTO: 4.1 K/UL (ref 1.8–7.7)
NEUTROPHILS NFR BLD: 59.6 % (ref 38–73)
NRBC BLD-RTO: 0 /100 WBC
PLATELET # BLD AUTO: 204 K/UL (ref 150–350)
PMV BLD AUTO: 9.7 FL (ref 9.2–12.9)
POTASSIUM SERPL-SCNC: 3.5 MMOL/L (ref 3.5–5.1)
PROT SERPL-MCNC: 4.8 G/DL (ref 6–8.4)
RBC # BLD AUTO: 3.21 M/UL (ref 4–5.4)
SODIUM SERPL-SCNC: 143 MMOL/L (ref 136–145)
WBC # BLD AUTO: 6.94 K/UL (ref 3.9–12.7)

## 2020-03-08 PROCEDURE — 63600175 PHARM REV CODE 636 W HCPCS: Performed by: NURSE PRACTITIONER

## 2020-03-08 PROCEDURE — 63600175 PHARM REV CODE 636 W HCPCS: Performed by: INTERNAL MEDICINE

## 2020-03-08 PROCEDURE — 12000002 HC ACUTE/MED SURGE SEMI-PRIVATE ROOM

## 2020-03-08 PROCEDURE — 99223 1ST HOSP IP/OBS HIGH 75: CPT | Mod: 24,,, | Performed by: ORTHOPAEDIC SURGERY

## 2020-03-08 PROCEDURE — 25000003 PHARM REV CODE 250: Performed by: NURSE PRACTITIONER

## 2020-03-08 PROCEDURE — 80053 COMPREHEN METABOLIC PANEL: CPT

## 2020-03-08 PROCEDURE — S4991 NICOTINE PATCH NONLEGEND: HCPCS | Performed by: NURSE PRACTITIONER

## 2020-03-08 PROCEDURE — 99223 PR INITIAL HOSPITAL CARE,LEVL III: ICD-10-PCS | Mod: 24,,, | Performed by: ORTHOPAEDIC SURGERY

## 2020-03-08 PROCEDURE — C9113 INJ PANTOPRAZOLE SODIUM, VIA: HCPCS | Performed by: NURSE PRACTITIONER

## 2020-03-08 PROCEDURE — 25000003 PHARM REV CODE 250: Performed by: INTERNAL MEDICINE

## 2020-03-08 PROCEDURE — 36415 COLL VENOUS BLD VENIPUNCTURE: CPT

## 2020-03-08 PROCEDURE — 63600175 PHARM REV CODE 636 W HCPCS: Performed by: FAMILY MEDICINE

## 2020-03-08 PROCEDURE — 94761 N-INVAS EAR/PLS OXIMETRY MLT: CPT

## 2020-03-08 PROCEDURE — 99900035 HC TECH TIME PER 15 MIN (STAT)

## 2020-03-08 PROCEDURE — 85025 COMPLETE CBC W/AUTO DIFF WBC: CPT

## 2020-03-08 PROCEDURE — 83735 ASSAY OF MAGNESIUM: CPT

## 2020-03-08 RX ORDER — MORPHINE SULFATE 4 MG/ML
4 INJECTION, SOLUTION INTRAMUSCULAR; INTRAVENOUS EVERY 12 HOURS PRN
Status: DISCONTINUED | OUTPATIENT
Start: 2020-03-08 | End: 2020-03-10

## 2020-03-08 RX ORDER — MAGNESIUM SULFATE 1 G/100ML
1 INJECTION INTRAVENOUS ONCE
Status: COMPLETED | OUTPATIENT
Start: 2020-03-08 | End: 2020-03-08

## 2020-03-08 RX ADMIN — ONDANSETRON HYDROCHLORIDE 4 MG: 2 SOLUTION INTRAMUSCULAR; INTRAVENOUS at 09:03

## 2020-03-08 RX ADMIN — Medication 500 MG: at 05:03

## 2020-03-08 RX ADMIN — LEVOTHYROXINE SODIUM 75 MCG: 50 TABLET ORAL at 05:03

## 2020-03-08 RX ADMIN — BENAZEPRIL HYDROCHLORIDE 20 MG: 10 TABLET, COATED ORAL at 09:03

## 2020-03-08 RX ADMIN — TRAZODONE HYDROCHLORIDE 100 MG: 50 TABLET ORAL at 09:03

## 2020-03-08 RX ADMIN — ALLOPURINOL 300 MG: 100 TABLET ORAL at 08:03

## 2020-03-08 RX ADMIN — MORPHINE SULFATE 4 MG: 4 INJECTION, SOLUTION INTRAMUSCULAR; INTRAVENOUS at 01:03

## 2020-03-08 RX ADMIN — LACTOBACILLUS ACIDOPHILUS / LACTOBACILLUS BULGARICUS 1 EACH: 100 MILLION CFU STRENGTH GRANULES at 08:03

## 2020-03-08 RX ADMIN — CLOPIDOGREL BISULFATE 75 MG: 75 TABLET ORAL at 08:03

## 2020-03-08 RX ADMIN — Medication 500 MG: at 12:03

## 2020-03-08 RX ADMIN — BACLOFEN 10 MG: 10 TABLET ORAL at 08:03

## 2020-03-08 RX ADMIN — PANTOPRAZOLE SODIUM 40 MG: 40 INJECTION, POWDER, LYOPHILIZED, FOR SOLUTION INTRAVENOUS at 08:03

## 2020-03-08 RX ADMIN — ONDANSETRON 8 MG: 2 INJECTION INTRAMUSCULAR; INTRAVENOUS at 08:03

## 2020-03-08 RX ADMIN — BENAZEPRIL HYDROCHLORIDE 20 MG: 10 TABLET, COATED ORAL at 08:03

## 2020-03-08 RX ADMIN — GABAPENTIN 400 MG: 400 CAPSULE ORAL at 09:03

## 2020-03-08 RX ADMIN — SODIUM CHLORIDE: 0.9 INJECTION, SOLUTION INTRAVENOUS at 02:03

## 2020-03-08 RX ADMIN — ESCITALOPRAM OXALATE 20 MG: 10 TABLET, FILM COATED ORAL at 09:03

## 2020-03-08 RX ADMIN — ACETAMINOPHEN 650 MG: 325 TABLET ORAL at 09:03

## 2020-03-08 RX ADMIN — LACTOBACILLUS ACIDOPHILUS / LACTOBACILLUS BULGARICUS 1 EACH: 100 MILLION CFU STRENGTH GRANULES at 09:03

## 2020-03-08 RX ADMIN — ROSUVASTATIN CALCIUM 20 MG: 20 TABLET, FILM COATED ORAL at 08:03

## 2020-03-08 RX ADMIN — ENOXAPARIN SODIUM 40 MG: 100 INJECTION SUBCUTANEOUS at 05:03

## 2020-03-08 RX ADMIN — GABAPENTIN 400 MG: 400 CAPSULE ORAL at 08:03

## 2020-03-08 RX ADMIN — METOPROLOL TARTRATE 100 MG: 50 TABLET, FILM COATED ORAL at 09:03

## 2020-03-08 RX ADMIN — HYDROCODONE BITARTRATE AND ACETAMINOPHEN 1 TABLET: 7.5; 325 TABLET ORAL at 08:03

## 2020-03-08 RX ADMIN — MAGNESIUM SULFATE 1 G: 1 INJECTION INTRAVENOUS at 10:03

## 2020-03-08 RX ADMIN — BACLOFEN 10 MG: 10 TABLET ORAL at 09:03

## 2020-03-08 RX ADMIN — OXYBUTYNIN CHLORIDE 10 MG: 5 TABLET, EXTENDED RELEASE ORAL at 08:03

## 2020-03-08 RX ADMIN — NICOTINE 1 PATCH: 14 PATCH, EXTENDED RELEASE TRANSDERMAL at 08:03

## 2020-03-08 RX ADMIN — BUPROPION HYDROCHLORIDE 150 MG: 150 TABLET, EXTENDED RELEASE ORAL at 09:03

## 2020-03-08 NOTE — NURSING
Spoke with Annemarie with answering service for Dr. Sanderson's office. Notified of consult for tomorrow AM.

## 2020-03-08 NOTE — RESPIRATORY THERAPY
03/07/20 1925   Patient Assessment/Suction   Level of Consciousness (AVPU) alert   Respiratory Effort Unlabored   Expansion/Accessory Muscles/Retractions no use of accessory muscles   All Lung Fields Breath Sounds clear;diminished   PRE-TX-O2   O2 Device (Oxygen Therapy) room air   SpO2 95 %   Pulse Oximetry Type Intermittent   $ Pulse Oximetry - Multiple Charge Pulse Oximetry - Multiple   Aerosol Therapy   $ Aerosol Therapy Charges PRN treatment not required

## 2020-03-08 NOTE — CONSULTS
[facility]  Orthopedics  Consult Note    Patient Name: Alessia Nelson  MRN: [id number]  Admission Date: 3/4/2020  Hospital Length of Stay: 2 days  Attending Provider: Aren Stewart MD  Primary Care Provider: Cheryl Bradley NP    Patient information was obtained from patient, past medical records and ER records.     All her current physician, nursing, and therapy notes were reviewed by me personally.    Consults  Subjective:     Principal Problem:C. difficile colitis    Chief Complaint:   Chief Complaint   Patient presents with    Abdominal Pain     Pain R mid back to RUQ abdomen with nausea after eating.        HPI: 64-year-old female was admitted with complaints of right upper quadrant pain.  She eventually was diagnosed with C diff infection.  During this hospital stay she began complaining of pain in the right knee.  She had a right total knee arthroplasty done on 12/18/2019.  The patient complained of pain with ambulation and swelling when she would be up on the knee trying to ambulate.  I was consulted to evaluate the knee.    Past Medical History:   Diagnosis Date    Acute pancreatitis     CAD (coronary artery disease)     CHF (congestive heart failure)     COPD (chronic obstructive pulmonary disease)     Depression     Diverticulosis     Encounter for blood transfusion     GERD (gastroesophageal reflux disease)     History of blood clots     1986 AFTER BOWEL RESCETION    History of bowel resection     Hypertension     Peritonitis     Seizures     HAD A SEIZURE FROM PHENERGAN     Thyroid disease     TIA (transient ischemic attack)        Past Surgical History:   Procedure Laterality Date    ADRENAL GLAND SURGERY      APPENDECTOMY      BACK SURGERY      CHOLECYSTECTOMY      COLONOSCOPY      CORONARY STENT PLACEMENT      CYSTOURETHROSCOPY N/A 11/13/2019    Procedure: CYSTOURETHROSCOPY;  Surgeon: Shun Nuñez MD;  Location: [facility] OR;  Service: Urology;   "Laterality: N/A;    ENDOSCOPIC ULTRASOUND OF UPPER GASTROINTESTINAL TRACT N/A 11/25/2019    Procedure: ULTRASOUND, UPPER GI TRACT, ENDOSCOPIC;  Surgeon: Alhaji Bridges MD;  Location: [address]);  Service: Endoscopy;  Laterality: N/A;  5 day hold Plavix, Dr Jovanni Serrano - pg  PM prep    HERNIA REPAIR      HYSTERECTOMY      INCISIONAL HERNIA REPAIR      INJECTION OF ANESTHETIC AGENT AROUND NERVE Right 5/27/2019    Procedure: RIGHT L5-S3 MEDIAL BRANCH BLOCKS;  Surgeon: Sneha Aragon MD;  Location: [facility] OR;  Service: Pain Management;  Laterality: Right;  **DO NOT STOP PLAVIX**    instestine      KNEE ARTHROPLASTY Right 12/18/2019    Procedure: ARTHROPLASTY, KNEE;  Surgeon: Ike Briceño II, MD;  Location: [facility] OR;  Service: Orthopedics;  Laterality: Right;    KNEE SURGERY  1983    PARATHYROID GLAND SURGERY      3 surgeries    RADIOFREQUENCY ABLATION Right 6/10/2019    Procedure: Radiofrequency Ablation - RIGHT L3-5 RADIOFREQUENCY ABLATION WITH [place] COOLIEF THERMAL SYSTEM;  Surgeon: Sneha Aragon MD;  Location: [facility] OR;  Service: Pain Management;  Laterality: Right;  **HOLD PLAVIX x 7 DAYS PRIOR**    UPPER GASTROINTESTINAL ENDOSCOPY         Review of patient's allergies indicates:   Allergen Reactions    Aspirin      "Makes stomach feel like it's on fire"    Lortab [hydrocodone-acetaminophen] Itching    Phenergan [promethazine]      SEIZURES    Promethazine hcl        Current Facility-Administered Medications   Medication    0.9%  NaCl infusion    acetaminophen tablet 650 mg    albuterol-ipratropium 2.5 mg-0.5 mg/3 mL nebulizer solution 3 mL    allopurinoL tablet 300 mg    baclofen tablet 10 mg    benazepriL tablet 20 mg    buPROPion TB24 tablet 150 mg    clopidogreL tablet 75 mg    dextrose 50% injection 12.5 g    dextrose 50% injection 25 g    enoxaparin injection 40 mg    escitalopram oxalate tablet 20 mg    gabapentin capsule 400 mg    glucagon (human recombinant) " injection 1 mg    glucose chewable tablet 16 g    glucose chewable tablet 24 g    lactobacillus acidophilus & bulgar 100 million cell packet 1 each    levothyroxine tablet 75 mcg    metoprolol tartrate (LOPRESSOR) tablet 100 mg    morphine injection 4 mg    nicotine 14 mg/24 hr 1 patch    ondansetron injection 4 mg    ondansetron injection 8 mg    oxybutynin 24 hr tablet 10 mg    pantoprazole injection 40 mg    potassium chloride 10% oral solution 40 mEq    potassium chloride 10% oral solution 40 mEq    potassium chloride 10% oral solution 40 mEq    rosuvastatin tablet 20 mg    sodium chloride 0.9% flush 10 mL    traZODone tablet 100 mg    vancomycin 250mg / 10ml oral suspension 500 mg     Family History     Problem Relation (Age of Onset)    Cancer Maternal Grandfather    Heart disease Father    Stroke Maternal Grandmother, Mother        Tobacco Use    Smoking status: Current Every Day Smoker     Packs/day: 1.00     Years: 46.00     Pack years: 46.00    Smokeless tobacco: Never Used   Substance and Sexual Activity    Alcohol use: Yes     Comment: RARELY    Drug use: No    Sexual activity: Not Currently     Review of Systems   Constitution: Positive for decreased appetite, fever and malaise/fatigue.   HENT: Negative.    Eyes: Negative.    Cardiovascular: Negative.    Respiratory: Negative.    Endocrine: Negative.    Hematologic/Lymphatic: Negative.    Skin: Negative.    Musculoskeletal: Positive for joint pain and joint swelling.   Gastrointestinal: Positive for bloating, abdominal pain, change in bowel habit, nausea and vomiting.   Genitourinary: Negative.    Neurological: Negative.    Psychiatric/Behavioral: Negative.    Allergic/Immunologic: Negative.      Objective:     Vital Signs (Most Recent):  Temp: 97.3 °F (36.3 °C) (03/08/20 1123)  Pulse: 61 (03/08/20 1123)  Resp: 16 (03/08/20 1123)  BP: (!) 146/66 (03/08/20 1123)  SpO2: 95 % (03/08/20 1123) Vital Signs (24h Range):  Temp:  [97.3 °F  "(36.3 °C)-98.3 °F (36.8 °C)] 97.3 °F (36.3 °C)  Pulse:  [57-65] 61  Resp:  [16-20] 16  SpO2:  [94 %-97 %] 95 %  BP: (146-182)/(66-78) 146/66     Weight: 85 kg (187 lb 6.4 oz)(bed scale)  Height: 5' 7" (170.2 cm)  Body mass index is 29.35 kg/m².      Intake/Output Summary (Last 24 hours) at 3/8/2020 1437  Last data filed at 3/7/2020 1710  Gross per 24 hour   Intake 958.33 ml   Output --   Net 958.33 ml       General    Constitutional: She is oriented to person, place, and time. She appears well-developed and well-nourished.   HENT:   Head: Normocephalic and atraumatic.   Eyes: EOM are normal. Pupils are equal, round, and reactive to light.   Neck: Neck supple. No tracheal deviation present.   Cardiovascular: Normal rate, regular rhythm and normal heart sounds.    Pulmonary/Chest: Effort normal and breath sounds normal.   Abdominal: Soft. Bowel sounds are normal. There is tenderness.   Neurological: She is alert and oriented to person, place, and time.   Psychiatric: She has a normal mood and affect. Her behavior is normal. Thought content normal.           Right Knee Exam     Comments:  Incision from the previous total knee arthroplasty is healing well  There are no signs of infection, no erythema  There is no effusion noted on exam  There is no tenderness to palpation noted  Full extension, flexion to 120°  No varus valgus instability  Distally motor functions intact EHL, FHL, TA, gastroc  Plus two dorsalis pedis and posterior tibial pulses  Normal sensation light touch dorsal, plantar, 1st webspace      Significant Labs:   CBC:   Recent Labs   Lab 03/07/20  0610 03/08/20  0516   WBC 6.44 6.94   HGB 9.4* 9.4*   HCT 29.8* 30.3*    204     All pertinent labs within the past 24 hours have been reviewed.    Significant Imaging: X-Ray: I have reviewed all pertinent results/findings and my personal findings are:  Three views of the right knee dated 03/07/2020 are available and personally interpreted by me.  There " is a right total knee arthroplasty that is well fixed and in good alignment.  There is no evidence of excessive soft tissue swelling or effusion.    Assessment/Plan:     64-year-old female who is about 3 months status post right total knee arthroplasty.  Right knee on exam appears relatively normal for 3 months postop.  The patient states that when she is up and ambulating is when the knee swells but it is not currently swollen on exam today.  X-rays show well-fixed implant in good alignment with no evidence of loosening.    I would recommend activity as tolerated.  It is not necessary to push the patient too hard as far as a physical therapy standpoint at this point.  If needed she can just go from the bed to the chair and we can  physical therapy after she gets over this gastrointestinal infection.    Thank you for your consult. I will sign off. Please contact us if you have any additional questions.    Iek Briceño II, MD  Orthopedics  [facility]

## 2020-03-08 NOTE — NURSING
Secure chatted Dr. Stewart in regards to pts Mag this morning and no prn orders. Just spoke with him and he states he will place orders for replacement. Will carry out.

## 2020-03-08 NOTE — SUBJECTIVE & OBJECTIVE
Interval History:  Improving    Review of Systems   Constitutional: Negative for diaphoresis and fever.   HENT: Negative for rhinorrhea and trouble swallowing.    Eyes: Negative for pain and redness.   Respiratory: Negative for apnea and cough.    Cardiovascular: Negative for chest pain and leg swelling.   Gastrointestinal: Positive for abdominal distention, abdominal pain and diarrhea.   Genitourinary: Negative for difficulty urinating and dysuria.   Musculoskeletal: Positive for arthralgias. Negative for back pain.   Skin: Negative for pallor and rash.   Neurological: Negative for seizures and facial asymmetry.   Psychiatric/Behavioral: Negative for agitation and behavioral problems.     Objective:     Vital Signs (Most Recent):  Temp: 97 °F (36.1 °C) (03/08/20 1536)  Pulse: 61 (03/08/20 1536)  Resp: 18 (03/08/20 1536)  BP: (!) 158/68 (03/08/20 1536)  SpO2: (!) 94 % (03/08/20 1536) Vital Signs (24h Range):  Temp:  [97 °F (36.1 °C)-98.3 °F (36.8 °C)] 97 °F (36.1 °C)  Pulse:  [57-65] 61  Resp:  [16-20] 18  SpO2:  [94 %-97 %] 94 %  BP: (146-182)/(66-78) 158/68     Weight: 85 kg (187 lb 6.4 oz)(bed scale)  Body mass index is 29.35 kg/m².    Intake/Output Summary (Last 24 hours) at 3/8/2020 1617  Last data filed at 3/7/2020 1710  Gross per 24 hour   Intake 958.33 ml   Output --   Net 958.33 ml      Physical Exam   Constitutional: She is oriented to person, place, and time. No distress.   HENT:   Head: Normocephalic and atraumatic.   Eyes: Conjunctivae are normal. No scleral icterus.   Neck: No JVD present. No tracheal deviation present.   Cardiovascular: Normal rate and regular rhythm.   Pulmonary/Chest: Effort normal and breath sounds normal.   Abdominal: She exhibits no distension. There is no tenderness.   Musculoskeletal: Normal range of motion. She exhibits no deformity.   Neurological: She is alert and oriented to person, place, and time.   Skin: Skin is warm and dry. She is not diaphoretic.   Psychiatric: She  has a normal mood and affect. Her behavior is normal.       Significant Labs: All pertinent labs within the past 24 hours have been reviewed.    Significant Imaging: I have reviewed all pertinent imaging results/findings within the past 24 hours.

## 2020-03-08 NOTE — ASSESSMENT & PLAN NOTE
Magnesium reviewed-   Recent Labs   Lab 03/07/20  0610 03/08/20  0516   MG 1.3* 1.0*    Follow daily Mag level and replete as needed.  Avoid oral Magnesium.

## 2020-03-08 NOTE — HPI
64-year-old female was admitted with complaints of right upper quadrant pain.  She eventually was diagnosed with C diff infection.  During this hospital stay she began complaining of pain in the right knee.  She had a right total knee arthroplasty done on 12/18/2019.  The patient complained of pain with ambulation and swelling when she would be up on the knee trying to ambulate.  I was consulted to evaluate the knee.

## 2020-03-08 NOTE — ASSESSMENT & PLAN NOTE
Continue contact isolation.  Continue oral vancomycin antibiotic therapy.  Continue supportive care with gentle IV fluid hydration, electrolyte repletion.  Abdominal pain stable.  No abdominal distention noted.  Advanced to soft diet today.  Add Lactinex.  Consult to Infectious Disease as this appears to be a recurrent C diff  infection and will likely need chronic therapy

## 2020-03-08 NOTE — PLAN OF CARE
Patient AAO, VSS. PIV CDI/ Infusing as ordered. Telemetry monitoring in place. Pt verbalized understanding of POC. Purposeful hourly/q2hr rounding done during shift to promote patient safety. Patient free from falls and injury during shift.  Bed in lowest position, brakes locked, and call light within reach.  Will continue to monitor.

## 2020-03-08 NOTE — HOSPITAL COURSE
The patient is currently admitted to the hospitalist service with AC diff infection and is on isolation protocols.  She is currently receiving IV antibiotics.

## 2020-03-08 NOTE — ASSESSMENT & PLAN NOTE
Lab Results   Component Value Date    TSH 8.576 (H) 11/26/2019     Chronic, TSH elevated, f/u outpatient

## 2020-03-08 NOTE — PLAN OF CARE
Plan of care reviewed with patient. Safety precautions maintained. Contact precautions maintained. Pt remains free from injury. Cardiac monitoring maintained. IV fluids infusing. Magnesium replaced. Pain & nausea controlled with prn medication. Ambulates stand by assist. Refuses TEDs and SCDs. Lovenox for VTE prevention. Ortho consulted today for R knee. Frequent checks performed q2 hours. Neuro checks performed q2h. Vital signs stable. Afebrile. AAOx4. Bed locked and low. Siderails raised x2. Non-skid socks on. Call light within reach.

## 2020-03-08 NOTE — RESPIRATORY THERAPY
03/08/20 0729   Patient Assessment/Suction   Level of Consciousness (AVPU) alert   All Lung Fields Breath Sounds diminished;clear   PRE-TX-O2   O2 Device (Oxygen Therapy) room air   SpO2 95 %   Pulse Oximetry Type Intermittent   $ Pulse Oximetry - Multiple Charge Pulse Oximetry - Multiple   Aerosol Therapy   $ Aerosol Therapy Charges PRN treatment not required

## 2020-03-08 NOTE — SUBJECTIVE & OBJECTIVE
Past Medical History:   Diagnosis Date    Acute pancreatitis     CAD (coronary artery disease)     CHF (congestive heart failure)     COPD (chronic obstructive pulmonary disease)     Depression     Diverticulosis     Encounter for blood transfusion     GERD (gastroesophageal reflux disease)     History of blood clots     1986 AFTER BOWEL RESCETION    History of bowel resection     Hypertension     Peritonitis     Seizures     HAD A SEIZURE FROM PHENERGAN     Thyroid disease     TIA (transient ischemic attack)        Past Surgical History:   Procedure Laterality Date    ADRENAL GLAND SURGERY      APPENDECTOMY      BACK SURGERY      CHOLECYSTECTOMY      COLONOSCOPY      CORONARY STENT PLACEMENT      CYSTOURETHROSCOPY N/A 11/13/2019    Procedure: CYSTOURETHROSCOPY;  Surgeon: Shun Nuñez MD;  Location: [facility] OR;  Service: Urology;  Laterality: N/A;    ENDOSCOPIC ULTRASOUND OF UPPER GASTROINTESTINAL TRACT N/A 11/25/2019    Procedure: ULTRASOUND, UPPER GI TRACT, ENDOSCOPIC;  Surgeon: Alhaji Bridges MD;  [facility]  Service: Endoscopy;  Laterality: N/A;  5 day hold Plavix, Dr Jovanni Serrano - pg  PM prep    HERNIA REPAIR      HYSTERECTOMY      INCISIONAL HERNIA REPAIR      INJECTION OF ANESTHETIC AGENT AROUND NERVE Right 5/27/2019    Procedure: RIGHT L5-S3 MEDIAL BRANCH BLOCKS;  Surgeon: Sneha Aragon MD;  Location: [facility] OR;  Service: Pain Management;  Laterality: Right;  **DO NOT STOP PLAVIX**    instestine      KNEE ARTHROPLASTY Right 12/18/2019    Procedure: ARTHROPLASTY, KNEE;  Surgeon: Ike Briceño II, MD;  Location: [facility] OR;  Service: Orthopedics;  Laterality: Right;    KNEE SURGERY  1983    PARATHYROID GLAND SURGERY      3 surgeries    RADIOFREQUENCY ABLATION Right 6/10/2019    Procedure: Radiofrequency Ablation - RIGHT L3-5 RADIOFREQUENCY ABLATION WITH [place] COOLIEF THERMAL SYSTEM;  Surgeon: Sneha Aragon MD;  Location: [facility] OR;  Service: Pain  "Management;  Laterality: Right;  **HOLD PLAVIX x 7 DAYS PRIOR**    UPPER GASTROINTESTINAL ENDOSCOPY         Review of patient's allergies indicates:   Allergen Reactions    Aspirin      "Makes stomach feel like it's on fire"    Lortab [hydrocodone-acetaminophen] Itching    Phenergan [promethazine]      SEIZURES    Promethazine hcl        Current Facility-Administered Medications   Medication    0.9%  NaCl infusion    acetaminophen tablet 650 mg    albuterol-ipratropium 2.5 mg-0.5 mg/3 mL nebulizer solution 3 mL    allopurinoL tablet 300 mg    baclofen tablet 10 mg    benazepriL tablet 20 mg    buPROPion TB24 tablet 150 mg    clopidogreL tablet 75 mg    dextrose 50% injection 12.5 g    dextrose 50% injection 25 g    enoxaparin injection 40 mg    escitalopram oxalate tablet 20 mg    gabapentin capsule 400 mg    glucagon (human recombinant) injection 1 mg    glucose chewable tablet 16 g    glucose chewable tablet 24 g    lactobacillus acidophilus & bulgar 100 million cell packet 1 each    levothyroxine tablet 75 mcg    metoprolol tartrate (LOPRESSOR) tablet 100 mg    morphine injection 4 mg    nicotine 14 mg/24 hr 1 patch    ondansetron injection 4 mg    ondansetron injection 8 mg    oxybutynin 24 hr tablet 10 mg    pantoprazole injection 40 mg    potassium chloride 10% oral solution 40 mEq    potassium chloride 10% oral solution 40 mEq    potassium chloride 10% oral solution 40 mEq    rosuvastatin tablet 20 mg    sodium chloride 0.9% flush 10 mL    traZODone tablet 100 mg    vancomycin 250mg / 10ml oral suspension 500 mg     Family History     Problem Relation (Age of Onset)    Cancer Maternal Grandfather    Heart disease Father    Stroke Maternal Grandmother, Mother        Tobacco Use    Smoking status: Current Every Day Smoker     Packs/day: 1.00     Years: 46.00     Pack years: 46.00    Smokeless tobacco: Never Used   Substance and Sexual Activity    Alcohol use: Yes     " "Comment: RARELY    Drug use: No    Sexual activity: Not Currently     Review of Systems   Constitution: Positive for decreased appetite, fever and malaise/fatigue.   HENT: Negative.    Eyes: Negative.    Cardiovascular: Negative.    Respiratory: Negative.    Endocrine: Negative.    Hematologic/Lymphatic: Negative.    Skin: Negative.    Musculoskeletal: Positive for joint pain and joint swelling.   Gastrointestinal: Positive for bloating, abdominal pain, change in bowel habit, nausea and vomiting.   Genitourinary: Negative.    Neurological: Negative.    Psychiatric/Behavioral: Negative.    Allergic/Immunologic: Negative.      Objective:     Vital Signs (Most Recent):  Temp: 97.3 °F (36.3 °C) (03/08/20 1123)  Pulse: 61 (03/08/20 1123)  Resp: 16 (03/08/20 1123)  BP: (!) 146/66 (03/08/20 1123)  SpO2: 95 % (03/08/20 1123) Vital Signs (24h Range):  Temp:  [97.3 °F (36.3 °C)-98.3 °F (36.8 °C)] 97.3 °F (36.3 °C)  Pulse:  [57-65] 61  Resp:  [16-20] 16  SpO2:  [94 %-97 %] 95 %  BP: (146-182)/(66-78) 146/66     Weight: 85 kg (187 lb 6.4 oz)(bed scale)  Height: 5' 7" (170.2 cm)  Body mass index is 29.35 kg/m².      Intake/Output Summary (Last 24 hours) at 3/8/2020 1437  Last data filed at 3/7/2020 1710  Gross per 24 hour   Intake 958.33 ml   Output --   Net 958.33 ml       General    Constitutional: She is oriented to person, place, and time. She appears well-developed and well-nourished.   HENT:   Head: Normocephalic and atraumatic.   Eyes: EOM are normal. Pupils are equal, round, and reactive to light.   Neck: Neck supple. No tracheal deviation present.   Cardiovascular: Normal rate, regular rhythm and normal heart sounds.    Pulmonary/Chest: Effort normal and breath sounds normal.   Abdominal: Soft. Bowel sounds are normal. There is tenderness.   Neurological: She is alert and oriented to person, place, and time.   Psychiatric: She has a normal mood and affect. Her behavior is normal. Thought content normal. "           Right Knee Exam     Comments:  Incision from the previous total knee arthroplasty is healing well  There are no signs of infection, no erythema  There is no effusion noted on exam  There is no tenderness to palpation noted  Full extension, flexion to 120°  No varus valgus instability  Distally motor functions intact EHL, FHL, TA, gastroc  Plus two dorsalis pedis and posterior tibial pulses  Normal sensation light touch dorsal, plantar, 1st webspace      Significant Labs:   CBC:   Recent Labs   Lab 03/07/20  0610 03/08/20  0516   WBC 6.44 6.94   HGB 9.4* 9.4*   HCT 29.8* 30.3*    204     All pertinent labs within the past 24 hours have been reviewed.    Significant Imaging: X-Ray: I have reviewed all pertinent results/findings and my personal findings are:  Three views of the right knee dated 03/07/2020 are available and personally interpreted by me.  There is a right total knee arthroplasty that is well fixed and in good alignment.  There is no evidence of excessive soft tissue swelling or effusion.

## 2020-03-08 NOTE — PROGRESS NOTES
[facility] Medicine  Progress Note    Patient Name: Alessia Nelson  MRN: [id number]  Patient Class: IP- Inpatient   Admission Date: 3/4/2020  Length of Stay: 2 days  Attending Physician: Aren Stewart MD  Primary Care Provider: Cheryl Bradley NP        Subjective:     Principal Problem:C. difficile colitis        HPI:  Alessia Nelson is a 64-year-old female with a past medical history of hypertension, hypothyroidism, hyperlipidemia, GERD, insomnia, depression, coronary artery disease, and a TIA [facility]  Patient states that collectively she has been experiencing abdominal symptoms for the past month including nausea, diarrhea, and abdominal pain. 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  Patient states that last week she began to experience diarrhea, states that it has since slowed down.  Patient endorses right upper abdominal pain, that is worse with eating.  Patient states the pain is similar to her episodes of pancreatitis in the past.  Patient endorses decreased p.o. intake for the past 2 weeks.  Patient does endorse nausea, denies vomiting, states that she has been taking Zofran at home.  Patient endorses that her last bowel movement was yesterday, increased flatulence.  Patient also endorses that her urine looks like tea.  Patient states her last colonoscopy was in 2014, patient does endorse that she was diagnosed with gastric ulcers approximately 20 years ago.  Patient denies rectal bleeding, fever, alcohol use, new medication, or recent travel.  Patient also states that she recently underwent a right knee replacement in December of 2019 per Dr. Briceño.  Patient endorses right knee swelling and pain with movement and physical therapy.  Patient states today the pain became intolerable to the posterior aspect of her right knee after she was sweeping and mopping  her home.  Patient states I could barely walk.  In the emergency room initial workup revealed acute kidney injury with possible enterocolitis on the CT of abdomen and pelvis.  Patient was given 2 L IV normal saline in the emergency room.  Patient placed in observation on 03/04/2020 at approximately 10:30 p.m..  Patient not accompanied by any visitors during my initial interview or physical exam.  Patient states that she resides at home by herself, denies use of ambulatory assist devices or supplemental oxygen.      Overview/Hospital Course:  No notes on file    Interval History:  Improving    Review of Systems   Constitutional: Negative for diaphoresis and fever.   HENT: Negative for rhinorrhea and trouble swallowing.    Eyes: Negative for pain and redness.   Respiratory: Negative for apnea and cough.    Cardiovascular: Negative for chest pain and leg swelling.   Gastrointestinal: Positive for abdominal distention, abdominal pain and diarrhea.   Genitourinary: Negative for difficulty urinating and dysuria.   Musculoskeletal: Positive for arthralgias. Negative for back pain.   Skin: Negative for pallor and rash.   Neurological: Negative for seizures and facial asymmetry.   Psychiatric/Behavioral: Negative for agitation and behavioral problems.     Objective:     Vital Signs (Most Recent):  Temp: 97 °F (36.1 °C) (03/08/20 1536)  Pulse: 61 (03/08/20 1536)  Resp: 18 (03/08/20 1536)  BP: (!) 158/68 (03/08/20 1536)  SpO2: (!) 94 % (03/08/20 1536) Vital Signs (24h Range):  Temp:  [97 °F (36.1 °C)-98.3 °F (36.8 °C)] 97 °F (36.1 °C)  Pulse:  [57-65] 61  Resp:  [16-20] 18  SpO2:  [94 %-97 %] 94 %  BP: (146-182)/(66-78) 158/68     Weight: 85 kg (187 lb 6.4 oz)(bed scale)  Body mass index is 29.35 kg/m².    Intake/Output Summary (Last 24 hours) at 3/8/2020 1617  Last data filed at 3/7/2020 1710  Gross per 24 hour   Intake 958.33 ml   Output --   Net 958.33 ml      Physical Exam   Constitutional: She is oriented to person,  place, and time. No distress.   HENT:   Head: Normocephalic and atraumatic.   Eyes: Conjunctivae are normal. No scleral icterus.   Neck: No JVD present. No tracheal deviation present.   Cardiovascular: Normal rate and regular rhythm.   Pulmonary/Chest: Effort normal and breath sounds normal.   Abdominal: She exhibits no distension. There is no tenderness.   Musculoskeletal: Normal range of motion. She exhibits no deformity.   Neurological: She is alert and oriented to person, place, and time.   Skin: Skin is warm and dry. She is not diaphoretic.   Psychiatric: She has a normal mood and affect. Her behavior is normal.       Significant Labs: All pertinent labs within the past 24 hours have been reviewed.    Significant Imaging: I have reviewed all pertinent imaging results/findings within the past 24 hours.      Assessment/Plan:      * C. difficile colitis  Continue contact isolation.  Continue oral vancomycin antibiotic therapy.  Continue supportive care with gentle IV fluid hydration, electrolyte repletion.  Abdominal pain stable.  No abdominal distention noted.  Advanced to soft diet today.  Add Lactinex.  Consult to Infectious Disease as this appears to be a recurrent C diff  infection and will likely need chronic therapy      Hypomagnesemia  Magnesium reviewed-   Recent Labs   Lab 03/07/20  0610 03/08/20  0516   MG 1.3* 1.0*    Follow daily Mag level and replete as needed.  Avoid oral Magnesium.           Enterocolitis  Continue IV fluid hydration.  Stool studies, review of past medical records reveals history of C diff infection in 2013.  Symptomatic management.  Cipro and Flagyl for now.  C diff antigen positive but toxin negative, PCR in process.  If PCR is positive will change medications to p.o. vancomycin.    Normocytic anemia  Monitor and transfuse if patient becomes hemodynamically unstable or H/H < 7/21  Daily CBC.  No evidence of active or acute bleeding noted on my initial interview or physical  exam.      Acute kidney injury  Resolved.  Reduce IV fluid hydration. Hold home hydrochlorothiazide.      Status post right knee replacement  Ultrasound of the right lower extremity was performed, negative for acute DVT.  Patient endorses swelling and pain with movement or physical therapy.  P.rainsley Kelleyco for pain control.  Follow up with orthopedic surgeon upon discharge from hospital.  Continue PT and OT.    CAD (coronary artery disease)  Chronic, controlled.  Will continue home medication.  Continuous telemetry monitoring.      Chronic diarrhea  Noted      Hyperlipidemia  Chronic, controlled. Will continue home medication.    Lab Results   Component Value Date    CHOL 89 (L) 07/24/2019     Lab Results   Component Value Date    HDL 40 07/24/2019     Lab Results   Component Value Date    LDLCALC 8.0 (L) 07/24/2019     Lab Results   Component Value Date    TRIG 205 (H) 07/24/2019     Lab Results   Component Value Date    CHOLHDL 44.9 07/24/2019               Hypothyroidism  Lab Results   Component Value Date    TSH 8.576 (H) 11/26/2019     Chronic, TSH elevated, f/u outpatient      GERD (gastroesophageal reflux disease)  Chronic, controlled.  Continue PPI      Fibromyalgia  Chronic, controlled.  Will continue home medications.  Patient states she follows with pain management as an outpatient.      Mild episode of recurrent major depressive disorder  Chronic, controlled.  Will continue home medications.      Nicotine dependence  Smoking cessation counseling performed. Dangers of cigarette smoking were reviewed with patient in detail and patient was encouraged to quit. Nicotine replacement options were discussed for > 3 minutes.          COPD (chronic obstructive pulmonary disease)  Chronic, controlled.  Will continue home medication.  Duo-nebs as needed.        VTE Risk Mitigation (From admission, onward)         Ordered     enoxaparin injection 40 mg  Every 24 hours (non-standard times)      03/06/20 1242     IP VTE  HIGH RISK PATIENT  Once      03/05/20 0223     Place ELENI hose  Until discontinued      03/05/20 0223     Place sequential compression device  Until discontinued      03/05/20 0223                      Aren Stewart MD  Department of Hospital Medicine   [facility]

## 2020-03-09 ENCOUNTER — OUTSIDE PLACE OF SERVICE (OUTPATIENT)
Dept: INFECTIOUS DISEASES | Facility: CLINIC | Age: 65
End: 2020-03-09
Payer: MEDICARE

## 2020-03-09 DIAGNOSIS — A04.72 C. DIFFICILE COLITIS: ICD-10-CM

## 2020-03-09 LAB
ALBUMIN SERPL BCP-MCNC: 2.5 G/DL (ref 3.5–5.2)
ALP SERPL-CCNC: 102 U/L (ref 55–135)
ALT SERPL W/O P-5'-P-CCNC: 11 U/L (ref 10–44)
ANION GAP SERPL CALC-SCNC: 7 MMOL/L (ref 8–16)
AST SERPL-CCNC: 14 U/L (ref 10–40)
BACTERIA STL CULT: NORMAL
BASOPHILS # BLD AUTO: 0.02 K/UL (ref 0–0.2)
BASOPHILS NFR BLD: 0.3 % (ref 0–1.9)
BILIRUB SERPL-MCNC: 0.2 MG/DL (ref 0.1–1)
BUN SERPL-MCNC: 5 MG/DL (ref 8–23)
CALCIUM SERPL-MCNC: 9.3 MG/DL (ref 8.7–10.5)
CHLORIDE SERPL-SCNC: 114 MMOL/L (ref 95–110)
CO2 SERPL-SCNC: 22 MMOL/L (ref 23–29)
CREAT SERPL-MCNC: 0.7 MG/DL (ref 0.5–1.4)
DIFFERENTIAL METHOD: ABNORMAL
EOSINOPHIL # BLD AUTO: 0.4 K/UL (ref 0–0.5)
EOSINOPHIL NFR BLD: 6.8 % (ref 0–8)
ERYTHROCYTE [DISTWIDTH] IN BLOOD BY AUTOMATED COUNT: 15.4 % (ref 11.5–14.5)
EST. GFR  (AFRICAN AMERICAN): >60 ML/MIN/1.73 M^2
EST. GFR  (NON AFRICAN AMERICAN): >60 ML/MIN/1.73 M^2
GLUCOSE SERPL-MCNC: 91 MG/DL (ref 70–110)
HCT VFR BLD AUTO: 31.6 % (ref 37–48.5)
HGB BLD-MCNC: 9.9 G/DL (ref 12–16)
IMM GRANULOCYTES # BLD AUTO: 0.01 K/UL (ref 0–0.04)
LYMPHOCYTES # BLD AUTO: 1.9 K/UL (ref 1–4.8)
LYMPHOCYTES NFR BLD: 28.8 % (ref 18–48)
MAGNESIUM SERPL-MCNC: 1.1 MG/DL (ref 1.6–2.6)
MCH RBC QN AUTO: 29.4 PG (ref 27–31)
MCHC RBC AUTO-ENTMCNC: 31.3 G/DL (ref 32–36)
MCV RBC AUTO: 94 FL (ref 82–98)
MONOCYTES # BLD AUTO: 0.7 K/UL (ref 0.3–1)
MONOCYTES NFR BLD: 11.2 % (ref 4–15)
NEUTROPHILS # BLD AUTO: 3.4 K/UL (ref 1.8–7.7)
NEUTROPHILS NFR BLD: 52.7 % (ref 38–73)
NRBC BLD-RTO: 0 /100 WBC
PLATELET # BLD AUTO: 202 K/UL (ref 150–350)
PMV BLD AUTO: 9.6 FL (ref 9.2–12.9)
POTASSIUM SERPL-SCNC: 3.3 MMOL/L (ref 3.5–5.1)
PROT SERPL-MCNC: 4.9 G/DL (ref 6–8.4)
RBC # BLD AUTO: 3.37 M/UL (ref 4–5.4)
SODIUM SERPL-SCNC: 143 MMOL/L (ref 136–145)
WBC # BLD AUTO: 6.43 K/UL (ref 3.9–12.7)

## 2020-03-09 PROCEDURE — 25000003 PHARM REV CODE 250: Performed by: INTERNAL MEDICINE

## 2020-03-09 PROCEDURE — 99223 PR INITIAL HOSPITAL CARE,LEVL III: ICD-10-PCS | Mod: S$GLB,,, | Performed by: INTERNAL MEDICINE

## 2020-03-09 PROCEDURE — 94761 N-INVAS EAR/PLS OXIMETRY MLT: CPT

## 2020-03-09 PROCEDURE — 63600175 PHARM REV CODE 636 W HCPCS: Performed by: FAMILY MEDICINE

## 2020-03-09 PROCEDURE — 99223 1ST HOSP IP/OBS HIGH 75: CPT | Mod: S$GLB,,, | Performed by: INTERNAL MEDICINE

## 2020-03-09 PROCEDURE — 63600175 PHARM REV CODE 636 W HCPCS: Performed by: HOSPITALIST

## 2020-03-09 PROCEDURE — 63600175 PHARM REV CODE 636 W HCPCS: Performed by: INTERNAL MEDICINE

## 2020-03-09 PROCEDURE — 85025 COMPLETE CBC W/AUTO DIFF WBC: CPT

## 2020-03-09 PROCEDURE — 63600175 PHARM REV CODE 636 W HCPCS: Performed by: NURSE PRACTITIONER

## 2020-03-09 PROCEDURE — 99900035 HC TECH TIME PER 15 MIN (STAT)

## 2020-03-09 PROCEDURE — 12000002 HC ACUTE/MED SURGE SEMI-PRIVATE ROOM

## 2020-03-09 PROCEDURE — 25000003 PHARM REV CODE 250: Performed by: NURSE PRACTITIONER

## 2020-03-09 PROCEDURE — C9113 INJ PANTOPRAZOLE SODIUM, VIA: HCPCS | Performed by: NURSE PRACTITIONER

## 2020-03-09 PROCEDURE — S4991 NICOTINE PATCH NONLEGEND: HCPCS | Performed by: NURSE PRACTITIONER

## 2020-03-09 PROCEDURE — 25000003 PHARM REV CODE 250: Performed by: HOSPITALIST

## 2020-03-09 PROCEDURE — 83735 ASSAY OF MAGNESIUM: CPT

## 2020-03-09 PROCEDURE — 80053 COMPREHEN METABOLIC PANEL: CPT

## 2020-03-09 PROCEDURE — 36415 COLL VENOUS BLD VENIPUNCTURE: CPT

## 2020-03-09 RX ORDER — LANOLIN ALCOHOL/MO/W.PET/CERES
400 CREAM (GRAM) TOPICAL 2 TIMES DAILY
Status: DISCONTINUED | OUTPATIENT
Start: 2020-03-09 | End: 2020-03-11

## 2020-03-09 RX ORDER — DICLOFENAC SODIUM 10 MG/G
2 GEL TOPICAL 3 TIMES DAILY
Status: DISCONTINUED | OUTPATIENT
Start: 2020-03-09 | End: 2020-03-12 | Stop reason: HOSPADM

## 2020-03-09 RX ORDER — POTASSIUM CHLORIDE 20 MEQ/1
20 TABLET, EXTENDED RELEASE ORAL 2 TIMES DAILY
Status: DISCONTINUED | OUTPATIENT
Start: 2020-03-09 | End: 2020-03-11

## 2020-03-09 RX ORDER — MAGNESIUM SULFATE HEPTAHYDRATE 40 MG/ML
2 INJECTION, SOLUTION INTRAVENOUS ONCE
Status: COMPLETED | OUTPATIENT
Start: 2020-03-09 | End: 2020-03-09

## 2020-03-09 RX ORDER — ACETAMINOPHEN 10 MG/ML
1000 INJECTION, SOLUTION INTRAVENOUS EVERY 8 HOURS
Status: COMPLETED | OUTPATIENT
Start: 2020-03-09 | End: 2020-03-10

## 2020-03-09 RX ORDER — PANTOPRAZOLE SODIUM 40 MG/1
40 TABLET, DELAYED RELEASE ORAL DAILY
Status: DISCONTINUED | OUTPATIENT
Start: 2020-03-10 | End: 2020-03-12 | Stop reason: HOSPADM

## 2020-03-09 RX ORDER — CHOLESTYRAMINE 4 G/4.8G
4 POWDER, FOR SUSPENSION ORAL 2 TIMES DAILY PRN
Status: DISCONTINUED | OUTPATIENT
Start: 2020-03-09 | End: 2020-03-12 | Stop reason: HOSPADM

## 2020-03-09 RX ADMIN — BUPROPION HYDROCHLORIDE 150 MG: 150 TABLET, EXTENDED RELEASE ORAL at 08:03

## 2020-03-09 RX ADMIN — BENAZEPRIL HYDROCHLORIDE 20 MG: 10 TABLET, COATED ORAL at 09:03

## 2020-03-09 RX ADMIN — POTASSIUM CHLORIDE 40 MEQ: 20 SOLUTION ORAL at 09:03

## 2020-03-09 RX ADMIN — METOPROLOL TARTRATE 100 MG: 50 TABLET, FILM COATED ORAL at 09:03

## 2020-03-09 RX ADMIN — Medication 400 MG: at 05:03

## 2020-03-09 RX ADMIN — MORPHINE SULFATE 4 MG: 4 INJECTION, SOLUTION INTRAMUSCULAR; INTRAVENOUS at 12:03

## 2020-03-09 RX ADMIN — CLOPIDOGREL BISULFATE 75 MG: 75 TABLET ORAL at 09:03

## 2020-03-09 RX ADMIN — Medication 500 MG: at 05:03

## 2020-03-09 RX ADMIN — ONDANSETRON HYDROCHLORIDE 4 MG: 2 SOLUTION INTRAMUSCULAR; INTRAVENOUS at 11:03

## 2020-03-09 RX ADMIN — ONDANSETRON HYDROCHLORIDE 4 MG: 2 SOLUTION INTRAMUSCULAR; INTRAVENOUS at 06:03

## 2020-03-09 RX ADMIN — BACLOFEN 10 MG: 10 TABLET ORAL at 09:03

## 2020-03-09 RX ADMIN — SODIUM CHLORIDE: 0.9 INJECTION, SOLUTION INTRAVENOUS at 10:03

## 2020-03-09 RX ADMIN — BENAZEPRIL HYDROCHLORIDE 20 MG: 10 TABLET, COATED ORAL at 08:03

## 2020-03-09 RX ADMIN — LACTOBACILLUS ACIDOPHILUS / LACTOBACILLUS BULGARICUS 1 EACH: 100 MILLION CFU STRENGTH GRANULES at 09:03

## 2020-03-09 RX ADMIN — GABAPENTIN 400 MG: 400 CAPSULE ORAL at 09:03

## 2020-03-09 RX ADMIN — ACETAMINOPHEN 1000 MG: 10 INJECTION, SOLUTION INTRAVENOUS at 09:03

## 2020-03-09 RX ADMIN — BACLOFEN 10 MG: 10 TABLET ORAL at 08:03

## 2020-03-09 RX ADMIN — ALLOPURINOL 300 MG: 100 TABLET ORAL at 09:03

## 2020-03-09 RX ADMIN — OXYBUTYNIN CHLORIDE 10 MG: 5 TABLET, EXTENDED RELEASE ORAL at 09:03

## 2020-03-09 RX ADMIN — ONDANSETRON 8 MG: 2 INJECTION INTRAMUSCULAR; INTRAVENOUS at 11:03

## 2020-03-09 RX ADMIN — METOPROLOL TARTRATE 100 MG: 50 TABLET, FILM COATED ORAL at 08:03

## 2020-03-09 RX ADMIN — DICLOFENAC 2 G: 10 GEL TOPICAL at 09:03

## 2020-03-09 RX ADMIN — LEVOTHYROXINE SODIUM 75 MCG: 50 TABLET ORAL at 05:03

## 2020-03-09 RX ADMIN — Medication 500 MG: at 12:03

## 2020-03-09 RX ADMIN — ESCITALOPRAM OXALATE 20 MG: 10 TABLET, FILM COATED ORAL at 08:03

## 2020-03-09 RX ADMIN — NICOTINE 1 PATCH: 14 PATCH, EXTENDED RELEASE TRANSDERMAL at 09:03

## 2020-03-09 RX ADMIN — ROSUVASTATIN CALCIUM 20 MG: 20 TABLET, FILM COATED ORAL at 09:03

## 2020-03-09 RX ADMIN — MORPHINE SULFATE 4 MG: 4 INJECTION, SOLUTION INTRAMUSCULAR; INTRAVENOUS at 01:03

## 2020-03-09 RX ADMIN — TRAZODONE HYDROCHLORIDE 100 MG: 50 TABLET ORAL at 08:03

## 2020-03-09 RX ADMIN — GABAPENTIN 400 MG: 400 CAPSULE ORAL at 08:03

## 2020-03-09 RX ADMIN — PANTOPRAZOLE SODIUM 40 MG: 40 INJECTION, POWDER, LYOPHILIZED, FOR SOLUTION INTRAVENOUS at 09:03

## 2020-03-09 RX ADMIN — MAGNESIUM SULFATE IN WATER 2 G: 40 INJECTION, SOLUTION INTRAVENOUS at 05:03

## 2020-03-09 RX ADMIN — Medication 500 MG: at 11:03

## 2020-03-09 RX ADMIN — LACTOBACILLUS ACIDOPHILUS / LACTOBACILLUS BULGARICUS 1 EACH: 100 MILLION CFU STRENGTH GRANULES at 08:03

## 2020-03-09 RX ADMIN — POTASSIUM CHLORIDE 20 MEQ: 1500 TABLET, EXTENDED RELEASE ORAL at 05:03

## 2020-03-09 RX ADMIN — ENOXAPARIN SODIUM 40 MG: 100 INJECTION SUBCUTANEOUS at 05:03

## 2020-03-09 NOTE — PLAN OF CARE
"Plan of care reviewed with patient. States "understanding." Contact precautions for c-diff maintained. Bowel sounds auscultated in all 4 quads. Abd is soft and nontender to touch. IV site is clean, dry and intact. Pain is controlled with current regimen. Will continue to monitor, observe and note any changes. Safety maintained.   "

## 2020-03-09 NOTE — RESPIRATORY THERAPY
03/08/20 2001   Patient Assessment/Suction   Level of Consciousness (AVPU) alert   Respiratory Effort Unlabored   Expansion/Accessory Muscles/Retractions no use of accessory muscles   All Lung Fields Breath Sounds clear   PRE-TX-O2   O2 Device (Oxygen Therapy) room air   SpO2 95 %   Pulse Oximetry Type Intermittent   $ Pulse Oximetry - Multiple Charge Pulse Oximetry - Multiple   Aerosol Therapy   $ Aerosol Therapy Charges PRN treatment not required

## 2020-03-09 NOTE — RESPIRATORY THERAPY
03/09/20 0725   Patient Assessment/Suction   Level of Consciousness (AVPU) alert   PRE-TX-O2   O2 Device (Oxygen Therapy) room air   SpO2 97 %   Pulse Oximetry Type Intermittent   $ Pulse Oximetry - Multiple Charge Pulse Oximetry - Multiple   Aerosol Therapy   $ Aerosol Therapy Charges PRN treatment not required

## 2020-03-09 NOTE — SUBJECTIVE & OBJECTIVE
Interval History: *pt c/o pain right hip -and right leg swelling     Us leg recently negative --on lovenox for VTE       Review of Systems   Constitutional: Negative for chills and fever.   Respiratory: Negative for cough and shortness of breath.    Cardiovascular: Negative for chest pain and leg swelling.   Gastrointestinal: Positive for diarrhea.   Genitourinary: Negative for difficulty urinating and dysuria.   Musculoskeletal: Positive for arthralgias.   Neurological: Positive for weakness. Negative for dizziness.     Objective:     Vital Signs (Most Recent):  Temp: 96.9 °F (36.1 °C) (03/09/20 1132)  Pulse: (!) 59 (03/09/20 1132)  Resp: 18 (03/09/20 1132)  BP: (!) 157/68 (03/09/20 1132)  SpO2: 97 % (03/09/20 1132) Vital Signs (24h Range):  Temp:  [96.9 °F (36.1 °C)-98.5 °F (36.9 °C)] 96.9 °F (36.1 °C)  Pulse:  [55-63] 59  Resp:  [16-18] 18  SpO2:  [94 %-97 %] 97 %  BP: (147-191)/(65-84) 157/68     Weight: 85 kg (187 lb 6.4 oz)(bed scale)  Body mass index is 29.35 kg/m².    Intake/Output Summary (Last 24 hours) at 3/9/2020 1650  Last data filed at 3/8/2020 1800  Gross per 24 hour   Intake 920 ml   Output --   Net 920 ml      Physical Exam   Constitutional: She is oriented to person, place, and time. She appears well-developed and well-nourished. No distress.   Chronically ill-appearing female   HENT:   Head: Normocephalic and atraumatic.   Eyes: Pupils are equal, round, and reactive to light. EOM are normal. Right eye exhibits no discharge. Left eye exhibits no discharge.   Neck: Normal range of motion. No JVD present.   Cardiovascular: Normal rate, regular rhythm, normal heart sounds and intact distal pulses.   No murmur heard.  Pulmonary/Chest: Effort normal and breath sounds normal. No respiratory distress. She has no wheezes. She has no rales. She exhibits no tenderness.   On room air   Abdominal: Soft. Bowel sounds are normal. She exhibits no distension. There is no tenderness. There is no rebound and no  guarding.   Generalized soreness upon palpation but no definite tenderness to deep palpation or any peritoneal signs or guarding present.   Genitourinary:   Genitourinary Comments: Exam Deferred      Musculoskeletal: Normal range of motion. She exhibits edema. She exhibits no tenderness or deformity.   Mild right knee swelling pain on lateral aspect of patella  Right leg swollen upper/lower -non pitting edema   Right hip -tender over bursa    Neurological: She is alert and oriented to person, place, and time. No cranial nerve deficit or sensory deficit.   Skin: Skin is warm and dry. Capillary refill takes 2 to 3 seconds. No rash noted. She is not diaphoretic. No erythema.   Psychiatric: She has a normal mood and affect. Her behavior is normal.   Nursing note and vitals reviewed.      Significant Labs: All pertinent labs within the past 24 hours have been reviewed.    Significant Imaging: I have reviewed and interpreted all pertinent imaging results/findings within the past 24 hours.

## 2020-03-10 PROBLEM — J81.0 PULMONARY EDEMA, ACUTE: Status: ACTIVE | Noted: 2020-03-10

## 2020-03-10 PROBLEM — R00.1 BRADYCARDIA: Status: ACTIVE | Noted: 2020-03-10

## 2020-03-10 LAB
ALBUMIN SERPL BCP-MCNC: 2.6 G/DL (ref 3.5–5.2)
ALP SERPL-CCNC: 105 U/L (ref 55–135)
ALT SERPL W/O P-5'-P-CCNC: 22 U/L (ref 10–44)
ANION GAP SERPL CALC-SCNC: 6 MMOL/L (ref 8–16)
AST SERPL-CCNC: 43 U/L (ref 10–40)
BASOPHILS # BLD AUTO: 0.02 K/UL (ref 0–0.2)
BASOPHILS NFR BLD: 0.3 % (ref 0–1.9)
BILIRUB SERPL-MCNC: 0.2 MG/DL (ref 0.1–1)
BUN SERPL-MCNC: 4 MG/DL (ref 8–23)
CALCIUM SERPL-MCNC: 9.5 MG/DL (ref 8.7–10.5)
CHLORIDE SERPL-SCNC: 112 MMOL/L (ref 95–110)
CO2 SERPL-SCNC: 24 MMOL/L (ref 23–29)
CREAT SERPL-MCNC: 0.7 MG/DL (ref 0.5–1.4)
DIFFERENTIAL METHOD: ABNORMAL
EOSINOPHIL # BLD AUTO: 0.4 K/UL (ref 0–0.5)
EOSINOPHIL NFR BLD: 6.2 % (ref 0–8)
ERYTHROCYTE [DISTWIDTH] IN BLOOD BY AUTOMATED COUNT: 15.4 % (ref 11.5–14.5)
EST. GFR  (AFRICAN AMERICAN): >60 ML/MIN/1.73 M^2
EST. GFR  (NON AFRICAN AMERICAN): >60 ML/MIN/1.73 M^2
GLUCOSE SERPL-MCNC: 110 MG/DL (ref 70–110)
HCT VFR BLD AUTO: 29.9 % (ref 37–48.5)
HGB BLD-MCNC: 9.4 G/DL (ref 12–16)
IMM GRANULOCYTES # BLD AUTO: 0.01 K/UL (ref 0–0.04)
LYMPHOCYTES # BLD AUTO: 2.2 K/UL (ref 1–4.8)
LYMPHOCYTES NFR BLD: 32.8 % (ref 18–48)
MAGNESIUM SERPL-MCNC: 1.4 MG/DL (ref 1.6–2.6)
MCH RBC QN AUTO: 29.5 PG (ref 27–31)
MCHC RBC AUTO-ENTMCNC: 31.4 G/DL (ref 32–36)
MCV RBC AUTO: 94 FL (ref 82–98)
MONOCYTES # BLD AUTO: 0.6 K/UL (ref 0.3–1)
MONOCYTES NFR BLD: 8.9 % (ref 4–15)
NEUTROPHILS # BLD AUTO: 3.4 K/UL (ref 1.8–7.7)
NEUTROPHILS NFR BLD: 51.6 % (ref 38–73)
NRBC BLD-RTO: 0 /100 WBC
PLATELET # BLD AUTO: 188 K/UL (ref 150–350)
PMV BLD AUTO: 9.2 FL (ref 9.2–12.9)
POTASSIUM SERPL-SCNC: 3.7 MMOL/L (ref 3.5–5.1)
PROT SERPL-MCNC: 5 G/DL (ref 6–8.4)
RBC # BLD AUTO: 3.19 M/UL (ref 4–5.4)
SODIUM SERPL-SCNC: 142 MMOL/L (ref 136–145)
WBC # BLD AUTO: 6.61 K/UL (ref 3.9–12.7)

## 2020-03-10 PROCEDURE — 25000003 PHARM REV CODE 250: Performed by: NURSE PRACTITIONER

## 2020-03-10 PROCEDURE — 12000002 HC ACUTE/MED SURGE SEMI-PRIVATE ROOM

## 2020-03-10 PROCEDURE — 85025 COMPLETE CBC W/AUTO DIFF WBC: CPT

## 2020-03-10 PROCEDURE — 63600175 PHARM REV CODE 636 W HCPCS: Performed by: INTERNAL MEDICINE

## 2020-03-10 PROCEDURE — 63600175 PHARM REV CODE 636 W HCPCS: Performed by: FAMILY MEDICINE

## 2020-03-10 PROCEDURE — 36415 COLL VENOUS BLD VENIPUNCTURE: CPT

## 2020-03-10 PROCEDURE — 63600175 PHARM REV CODE 636 W HCPCS: Performed by: HOSPITALIST

## 2020-03-10 PROCEDURE — 99900035 HC TECH TIME PER 15 MIN (STAT)

## 2020-03-10 PROCEDURE — 63600175 PHARM REV CODE 636 W HCPCS: Performed by: NURSE PRACTITIONER

## 2020-03-10 PROCEDURE — 94761 N-INVAS EAR/PLS OXIMETRY MLT: CPT

## 2020-03-10 PROCEDURE — 99232 PR SUBSEQUENT HOSPITAL CARE,LEVL II: ICD-10-PCS | Mod: S$GLB,,, | Performed by: INTERNAL MEDICINE

## 2020-03-10 PROCEDURE — 80053 COMPREHEN METABOLIC PANEL: CPT

## 2020-03-10 PROCEDURE — 99232 SBSQ HOSP IP/OBS MODERATE 35: CPT | Mod: S$GLB,,, | Performed by: INTERNAL MEDICINE

## 2020-03-10 PROCEDURE — 25000003 PHARM REV CODE 250: Performed by: HOSPITALIST

## 2020-03-10 PROCEDURE — 25000003 PHARM REV CODE 250: Performed by: INTERNAL MEDICINE

## 2020-03-10 PROCEDURE — 83735 ASSAY OF MAGNESIUM: CPT

## 2020-03-10 PROCEDURE — S4991 NICOTINE PATCH NONLEGEND: HCPCS | Performed by: NURSE PRACTITIONER

## 2020-03-10 RX ORDER — L. ACIDOPHILUS/L.BULGARICUS 100MM CELL
1 GRANULES IN PACKET (EA) ORAL 3 TIMES DAILY
Status: DISCONTINUED | OUTPATIENT
Start: 2020-03-10 | End: 2020-03-12 | Stop reason: HOSPADM

## 2020-03-10 RX ORDER — ONDANSETRON 2 MG/ML
4 INJECTION INTRAMUSCULAR; INTRAVENOUS ONCE
Status: COMPLETED | OUTPATIENT
Start: 2020-03-10 | End: 2020-03-10

## 2020-03-10 RX ORDER — LIDOCAINE 50 MG/G
1 PATCH TOPICAL
Status: DISCONTINUED | OUTPATIENT
Start: 2020-03-10 | End: 2020-03-12 | Stop reason: HOSPADM

## 2020-03-10 RX ORDER — FUROSEMIDE 10 MG/ML
40 INJECTION INTRAMUSCULAR; INTRAVENOUS ONCE
Status: COMPLETED | OUTPATIENT
Start: 2020-03-10 | End: 2020-03-10

## 2020-03-10 RX ORDER — METOPROLOL TARTRATE 50 MG/1
50 TABLET ORAL 2 TIMES DAILY
Status: DISCONTINUED | OUTPATIENT
Start: 2020-03-10 | End: 2020-03-12 | Stop reason: HOSPADM

## 2020-03-10 RX ORDER — GUAIFENESIN/DEXTROMETHORPHAN 100-10MG/5
10 SYRUP ORAL EVERY 6 HOURS PRN
Status: DISCONTINUED | OUTPATIENT
Start: 2020-03-10 | End: 2020-03-12 | Stop reason: HOSPADM

## 2020-03-10 RX ORDER — FUROSEMIDE 10 MG/ML
40 INJECTION INTRAMUSCULAR; INTRAVENOUS
Status: DISCONTINUED | OUTPATIENT
Start: 2020-03-10 | End: 2020-03-10

## 2020-03-10 RX ORDER — HYDRALAZINE HYDROCHLORIDE 25 MG/1
25 TABLET, FILM COATED ORAL EVERY 8 HOURS PRN
Status: DISCONTINUED | OUTPATIENT
Start: 2020-03-10 | End: 2020-03-12 | Stop reason: HOSPADM

## 2020-03-10 RX ORDER — MORPHINE SULFATE 2 MG/ML
2 INJECTION, SOLUTION INTRAMUSCULAR; INTRAVENOUS ONCE
Status: COMPLETED | OUTPATIENT
Start: 2020-03-10 | End: 2020-03-10

## 2020-03-10 RX ORDER — CHOLESTYRAMINE 4 G/4.8G
1 POWDER, FOR SUSPENSION ORAL 2 TIMES DAILY
Status: DISCONTINUED | OUTPATIENT
Start: 2020-03-10 | End: 2020-03-12 | Stop reason: HOSPADM

## 2020-03-10 RX ADMIN — OXYBUTYNIN CHLORIDE 10 MG: 5 TABLET, EXTENDED RELEASE ORAL at 09:03

## 2020-03-10 RX ADMIN — GUAIFENESIN AND DEXTROMETHORPHAN 10 ML: 100; 10 SYRUP ORAL at 05:03

## 2020-03-10 RX ADMIN — POTASSIUM CHLORIDE 20 MEQ: 1500 TABLET, EXTENDED RELEASE ORAL at 09:03

## 2020-03-10 RX ADMIN — LEVOTHYROXINE SODIUM 75 MCG: 50 TABLET ORAL at 05:03

## 2020-03-10 RX ADMIN — LACTOBACILLUS ACIDOPHILUS / LACTOBACILLUS BULGARICUS 1 EACH: 100 MILLION CFU STRENGTH GRANULES at 04:03

## 2020-03-10 RX ADMIN — ONDANSETRON 4 MG: 2 INJECTION INTRAMUSCULAR; INTRAVENOUS at 10:03

## 2020-03-10 RX ADMIN — DICLOFENAC 2 G: 10 GEL TOPICAL at 04:03

## 2020-03-10 RX ADMIN — FUROSEMIDE 40 MG: 10 INJECTION, SOLUTION INTRAMUSCULAR; INTRAVENOUS at 04:03

## 2020-03-10 RX ADMIN — ACETAMINOPHEN 650 MG: 325 TABLET ORAL at 09:03

## 2020-03-10 RX ADMIN — LIDOCAINE 1 PATCH: 50 PATCH TOPICAL at 11:03

## 2020-03-10 RX ADMIN — CLOPIDOGREL BISULFATE 75 MG: 75 TABLET ORAL at 09:03

## 2020-03-10 RX ADMIN — PANTOPRAZOLE SODIUM 40 MG: 40 TABLET, DELAYED RELEASE ORAL at 09:03

## 2020-03-10 RX ADMIN — MORPHINE SULFATE 2 MG: 2 INJECTION, SOLUTION INTRAMUSCULAR; INTRAVENOUS at 11:03

## 2020-03-10 RX ADMIN — CHOLESTYRAMINE 4 G: 4 POWDER, FOR SUSPENSION ORAL at 09:03

## 2020-03-10 RX ADMIN — DICLOFENAC 2 G: 10 GEL TOPICAL at 09:03

## 2020-03-10 RX ADMIN — ONDANSETRON HYDROCHLORIDE 4 MG: 2 SOLUTION INTRAMUSCULAR; INTRAVENOUS at 07:03

## 2020-03-10 RX ADMIN — BACLOFEN 10 MG: 10 TABLET ORAL at 09:03

## 2020-03-10 RX ADMIN — Medication 125 MG: at 06:03

## 2020-03-10 RX ADMIN — HYDRALAZINE HYDROCHLORIDE 25 MG: 25 TABLET, FILM COATED ORAL at 12:03

## 2020-03-10 RX ADMIN — FUROSEMIDE 40 MG: 10 INJECTION, SOLUTION INTRAMUSCULAR; INTRAVENOUS at 11:03

## 2020-03-10 RX ADMIN — NICOTINE 1 PATCH: 14 PATCH, EXTENDED RELEASE TRANSDERMAL at 09:03

## 2020-03-10 RX ADMIN — ALLOPURINOL 300 MG: 100 TABLET ORAL at 09:03

## 2020-03-10 RX ADMIN — BUPROPION HYDROCHLORIDE 150 MG: 150 TABLET, EXTENDED RELEASE ORAL at 09:03

## 2020-03-10 RX ADMIN — ACETAMINOPHEN 1000 MG: 10 INJECTION, SOLUTION INTRAVENOUS at 01:03

## 2020-03-10 RX ADMIN — ONDANSETRON HYDROCHLORIDE 4 MG: 2 SOLUTION INTRAMUSCULAR; INTRAVENOUS at 09:03

## 2020-03-10 RX ADMIN — TRAZODONE HYDROCHLORIDE 100 MG: 50 TABLET ORAL at 09:03

## 2020-03-10 RX ADMIN — ACETAMINOPHEN 1000 MG: 10 INJECTION, SOLUTION INTRAVENOUS at 05:03

## 2020-03-10 RX ADMIN — GABAPENTIN 400 MG: 400 CAPSULE ORAL at 09:03

## 2020-03-10 RX ADMIN — ROSUVASTATIN CALCIUM 20 MG: 20 TABLET, FILM COATED ORAL at 09:03

## 2020-03-10 RX ADMIN — BENAZEPRIL HYDROCHLORIDE 20 MG: 10 TABLET, COATED ORAL at 09:03

## 2020-03-10 RX ADMIN — Medication 500 MG: at 01:03

## 2020-03-10 RX ADMIN — ENOXAPARIN SODIUM 40 MG: 100 INJECTION SUBCUTANEOUS at 04:03

## 2020-03-10 RX ADMIN — Medication 125 MG: at 12:03

## 2020-03-10 RX ADMIN — ESCITALOPRAM OXALATE 20 MG: 10 TABLET, FILM COATED ORAL at 09:03

## 2020-03-10 RX ADMIN — Medication 500 MG: at 05:03

## 2020-03-10 RX ADMIN — MORPHINE SULFATE 4 MG: 4 INJECTION, SOLUTION INTRAMUSCULAR; INTRAVENOUS at 01:03

## 2020-03-10 RX ADMIN — Medication 400 MG: at 09:03

## 2020-03-10 RX ADMIN — LACTOBACILLUS ACIDOPHILUS / LACTOBACILLUS BULGARICUS 1 EACH: 100 MILLION CFU STRENGTH GRANULES at 09:03

## 2020-03-10 NOTE — PLAN OF CARE
Patient AAO, VSS. PIV CDI/ Infusing as ordered. Telemetry monitoring in place. Cdiff precautions still in place. Pt c/o pain, PRN meds given for resolve. Pt verbalized understanding of POC. Purposeful hourly/q2hr rounding done during shift to promote patient safety. Patient free from falls and injury during shift.  Bed in lowest position, brakes locked, and call light within reach.  Will continue to monitor.

## 2020-03-10 NOTE — RESPIRATORY THERAPY
03/10/20 0752   Patient Assessment/Suction   Level of Consciousness (AVPU) alert   Respiratory Effort Normal;Unlabored   Expansion/Accessory Muscles/Retractions no retractions;no use of accessory muscles;expansion symmetric   All Lung Fields Breath Sounds clear;Anterior:;Posterior:   Rhythm/Pattern, Respiratory depth regular;pattern regular;unlabored   Cough Frequency no cough   PRE-TX-O2   O2 Device (Oxygen Therapy) room air   SpO2 95 %   Pulse Oximetry Type Intermittent   $ Pulse Oximetry - Multiple Charge Pulse Oximetry - Multiple   Pulse (!) 52   Resp 16   Positioning Sitting on edge of bed (dangling)   Aerosol Therapy   $ Aerosol Therapy Charges PRN treatment not required   Respiratory Treatment Status (SVN) PRN treatment not required

## 2020-03-10 NOTE — ASSESSMENT & PLAN NOTE
Continue contact isolation.  Acute, recurrent -reviewed ID note and adjusted vancomycin   Continue oral vancomycin antibiotic therapy.  Continue supportive care with gentle IV fluid hydration, electrolyte repletion. -completed    Abdominal pain stable.  No abdominal distention noted.  Advanced to soft diet today.  Add Lactinex./cholestyramine for chronic diarreha   Consult to Infectious Disease as this appears to be a recurrent C diff  Infection-noted

## 2020-03-10 NOTE — ASSESSMENT & PLAN NOTE
Chronic, controlled.  Will continue home medication.  Duo-nebs as needed.  Monitor pulse ox  CXR--interstitial dis -possible edema ; dc ivf/ lasix x 1 dose and re-eval

## 2020-03-10 NOTE — RESPIRATORY THERAPY
03/09/20 1945   Patient Assessment/Suction   Level of Consciousness (AVPU) alert   Respiratory Effort Unlabored   Expansion/Accessory Muscles/Retractions no use of accessory muscles   All Lung Fields Breath Sounds clear   PRE-TX-O2   O2 Device (Oxygen Therapy) room air   SpO2 97 %   Pulse Oximetry Type Intermittent   $ Pulse Oximetry - Multiple Charge Pulse Oximetry - Multiple   Aerosol Therapy   $ Aerosol Therapy Charges PRN treatment not required

## 2020-03-10 NOTE — PLAN OF CARE
SW met with patient at bedside regarding skilled nursing facility consult.  Patient declined SNF placement.  Patient signed patient choice disclosure choosing [facility].       03/10/20 2291   Post-Acute Status   Post-Acute Authorization Home Health/Hospice   Patient choice form signed by patient/caregiver List with quality metrics by geographic area provided

## 2020-03-10 NOTE — ASSESSMENT & PLAN NOTE
Continue contact isolation.  Acute, recurrent   Continue oral vancomycin antibiotic therapy.  Continue supportive care with gentle IV fluid hydration, electrolyte repletion.  Abdominal pain stable.  No abdominal distention noted.  Advanced to soft diet today.  Add Lactinex.  Consult to Infectious Disease as this appears to be a recurrent C diff  infection and will likely need chronic therapy

## 2020-03-10 NOTE — CONSULTS
"Consult Note  Infectious Disease    Reason for Consult:  Recurrent C. Difficile colitis    HPI: Alessia Nelson is a   64 y.o. female with chronic diarrhea, who noted increased diarrhea frequency about 2 weeks ago. She had concurrent abdominal pain, nausea. She was found to have a positive Cdiff Ag, negative toxin, positive pcr. She has been on oral Vancomycin for 4 days with improvement. Her baseline is 3-4 soft to loose stools per day. She takes Colestid to mitigate the diarrhea of a partial colon resection and lactose intolerance. She reports 4 other episodes of antibiotic related diarrhea, the last of which was 2015. Our records indicate that she had antigen pos, toxin neg and pcr negative at that time. She is tolerating a regular diet. She is still recovering from a TKA in December and is not very mobile. She is still having some abdominal discomfort and post prandial stooling.     Review of patient's allergies indicates:   Allergen Reactions    Aspirin      "Makes stomach feel like it's on fire"    Lortab [hydrocodone-acetaminophen] Itching    Phenergan [promethazine]      SEIZURES    Promethazine hcl      Past Medical History:   Diagnosis Date    Acute pancreatitis     CAD (coronary artery disease)     CHF (congestive heart failure)     COPD (chronic obstructive pulmonary disease)     Depression     Diverticulosis     Encounter for blood transfusion     GERD (gastroesophageal reflux disease)     History of blood clots     1986 AFTER BOWEL RESCETION    History of bowel resection     Hypertension     Peritonitis     Seizures     HAD A SEIZURE FROM PHENERGAN     Thyroid disease     TIA (transient ischemic attack)      Past Surgical History:   Procedure Laterality Date    ADRENAL GLAND SURGERY      APPENDECTOMY      BACK SURGERY      CHOLECYSTECTOMY      COLONOSCOPY      CORONARY STENT PLACEMENT      CYSTOURETHROSCOPY N/A 11/13/2019    Procedure: CYSTOURETHROSCOPY;  Surgeon: Shun MENDIOLA" MD Joaquin;  Location: [facility] OR;  Service: Urology;  Laterality: N/A;    ENDOSCOPIC ULTRASOUND OF UPPER GASTROINTESTINAL TRACT N/A 11/25/2019    Procedure: ULTRASOUND, UPPER GI TRACT, ENDOSCOPIC;  Surgeon: Alhaji Bridges MD;  Location: [facility] ENDO ([address] [place] [address]);  Service: Endoscopy;  Laterality: N/A;  5 day hold Plavix, Dr Jovanni Serrano - pg  PM prep    HERNIA REPAIR      HYSTERECTOMY      INCISIONAL HERNIA REPAIR      INJECTION OF ANESTHETIC AGENT AROUND NERVE Right 5/27/2019    Procedure: RIGHT L5-S3 MEDIAL BRANCH BLOCKS;  Surgeon: Sneha Aragon MD;  Location: [facility];  Service: Pain Management;  Laterality: Right;  **DO NOT STOP PLAVIX**    instestine      KNEE ARTHROPLASTY Right 12/18/2019    Procedure: ARTHROPLASTY, KNEE;  Surgeon: Ike Briceño II, MD;  Location: [facility];  Service: Orthopedics;  Laterality: Right;    KNEE SURGERY  1983    PARATHYROID GLAND SURGERY      3 surgeries    RADIOFREQUENCY ABLATION Right 6/10/2019    Procedure: Radiofrequency Ablation - RIGHT L3-5 RADIOFREQUENCY ABLATION WITH 2threadsYARD COOLIEF THERMAL SYSTEM;  Surgeon: Sneha Aragon MD;  Location: [facility];  Service: Pain Management;  Laterality: Right;  **HOLD PLAVIX x 7 DAYS PRIOR**    UPPER GASTROINTESTINAL ENDOSCOPY       Social History     Socioeconomic History    Marital status: Single     Spouse name: Not on file    Number of children: 0    Years of education: Not on file    Highest education level: Not on file   Occupational History    Not on file   Social Needs    Financial resource strain: Not on file    Food insecurity:     Worry: Not on file     Inability: Not on file    Transportation needs:     Medical: Not on file     Non-medical: Not on file   Tobacco Use    Smoking status: Current Every Day Smoker     Packs/day: 1.00     Years: 46.00     Pack years: 46.00    Smokeless tobacco: Never Used   Substance and Sexual Activity    Alcohol use: Yes     Comment: RARELY    Drug use: No    Sexual activity:  Not Currently   Lifestyle    Physical activity:     Days per week: Not on file     Minutes per session: Not on file    Stress: Not on file   Relationships    Social connections:     Talks on phone: Not on file     Gets together: Not on file     Attends [employer] service: Not on file     Active member of club or organization: Not on file     Attends meetings of clubs or organizations: Not on file     Relationship status: Not on file   Other Topics Concern    Not on file   Social History Narrative    Not on file     Family History   Problem Relation Age of Onset    Stroke Maternal Grandmother     Cancer Maternal Grandfather     Stroke Mother     Heart disease Father        Pertinent medications noted:     Review of Systems:   No chills, fever, sweats,   No change in vision,   No sinus congestion,   No pain in mouth or throat. No problems with teeth, gums.  No chest pain,    No cough, sputum production, shortness of breath, dyspnea on exertion, pleurisy, hemoptysis  No nausea, vomiting  blood in stool, or focal abd pain, but does have generalized abd soreness  No dysphagia, odynophagia. Tolerating a regular diet  No dysuria, hematuria, but she does have strangury, retention,       No swelling of joints, redness of joints, injuries, or new focal pain. Has chronic back pain  No unusual headaches, dizziness, vertigo, numbness, paresthesias, neuropathy, falls  No anxiety, depression, substance abuse, sleep disturbance  No diabetes,    No bleeding, lymphadenopathy, anemia, No new rashes, lesions, or wounds   no exposure to persons on antibiotics or on chemo or with diarrhea   recent/prior steroids: po at some point after TKA  Outdoor activities:n/A  Travel: N/A  Implants: TKA  Antibiotic History: last antibiotics were in Dec with TKA    EXAM & DIAGNOSTICS REVIEWED:   Vitals:     Temp:  [96.9 °F (36.1 °C)-98.5 °F (36.9 °C)]   Temp: 98.1 °F (36.7 °C) (03/09/20 1917)  Pulse: 63 (03/09/20 1917)  Resp: 16 (03/09/20  1917)  BP: (!) 142/66 (03/09/20 1917)  SpO2: 97 % (03/09/20 1945)    Intake/Output Summary (Last 24 hours) at 3/9/2020 5967  Last data filed at 3/9/2020 1800  Gross per 24 hour   Intake 47253.66 ml   Output --   Net 53591.66 ml       General:  In NAD. Alert and attentive, cooperative, comfortable  Eyes:  Anicteric, PERRL, EOMI  ENT:  No ulcers, exudates, thrush, nares patent,   Neck:  supple, no masses or adenopathy appreciated  Lungs: Clear, no consolidation, rales, wheezes, rub  Heart:  RRR, no gallop/murmur/rub noted  Abd:  Soft,mildly and generally tender to palpation, ND, normal BS, no masses or organomegaly appreciated.  :  Voids   Musc:  Joints without effusion, swelling, erythema, synovitis, muscle wasting.   Skin:  No rashes. No palmar or plantar lesions. No subungual petechiae  Wound: Right knee looks fine  Neuro:  Alert, attentive, speech fluent, face symmetric, moves all extremities, no focal weakness. Ambulatory     Psych:  Calm and cooperative  Extrem: No edema, erythema, phlebitis, cellulitis, warm and well perfused  VAD:   peripheral    Isolation:  Special contact    Lines/Tubes/Drains:    General Labs reviewed:  Recent Labs   Lab 03/07/20  0610 03/08/20  0516 03/09/20  0528   WBC 6.44 6.94 6.43   HGB 9.4* 9.4* 9.9*   HCT 29.8* 30.3* 31.6*    204 202       Recent Labs   Lab 03/07/20  0610 03/08/20  0516 03/09/20  0528    143 143   K 3.8 3.5 3.3*   * 115* 114*   CO2 23 24 22*   BUN 5* 5* 5*   CREATININE 0.8 0.8 0.7   CALCIUM 9.6 9.4 9.3   PROT 4.8* 4.8* 4.9*   BILITOT 0.2 0.2 0.2   ALKPHOS 100 101 102   ALT 10 11 11   AST 10 11 14           Micro:  Microbiology Results (last 7 days)     Procedure Component Value Units Date/Time    Stool culture [[id number]] Collected:  03/05/20 0823    Order Status:  Completed Specimen:  Stool Updated:  03/09/20 1049     Stool Culture No Salmonella,Shigella,Vibrio,Campylobacter,Yersinia isolated.    E. coli 0157 antigen [[id number]] Collected:   03/05/20 0823    Order Status:  Completed Specimen:  Stool Updated:  03/06/20 1437     Shiga Toxin 1 E.coli Negative     Shiga Toxin 2 E.coli Negative    C Diff Toxin by PCR [[id number]]  (Abnormal) Collected:  03/05/20 0823    Order Status:  Completed Updated:  03/05/20 2027     C. diff PCR Positive    Clostridium difficile EIA [[id number]]  (Abnormal) Collected:  03/05/20 0823    Order Status:  Completed Specimen:  Stool Updated:  03/05/20 1149     C. diff Antigen Positive     C difficile Toxins A+B, EIA Negative     Comment: Testing not recommended for children <24 months old.           Imaging Reviewed:   CXR      Cardiology:    IMPRESSION & PLAN   1. Clostridium difficile colitis   The 5th episode in her life she recalls, the last of which was 2015(Ag pos, tox neg, pcr neg). Most recent antibiotics were in December with knee replacement  2. Chronic diarrhea due to prior colon resection  3. S/p right TKA in December with poor mobility  4. Urinary retention, by history and urethral stricture on cystoscopy 11/2019      Recommendations:  Po vancomycin 125 mg Po QID x 14d with short taper  Bid x 7d and daily x 7d then stop(would check medication coverage for this prior to discharge)  F/u primary care  Probiotics indefinitely  Continue colestid for diarrhea(same function as cholestyramine)  (hosp does not have colestid, and I have ordered cholestyramine)  Ok to discharge when you deem her physically able    Check post void residual. Needs to follow up with her urologic provider, Dr. Nuñez

## 2020-03-10 NOTE — PLAN OF CARE
Plan of care reviewed with patient. Safety precautions maintained. Contact precautions maintained. Pt remains free from injury. Cardiac monitoring maintained. IV fluids d/c'd. Scheduled electrolytes. Pain controlled with scheduled medication. Ambulates stand by assist/independently. Refuses TEDs and SCDs. Lovenox for VTE prevention. Emphasized importance of ambulating and being up in chair during the day. Tolerating diet well. BM frequency decreased. Frequent checks performed q2 hours. Neuro checks performed q2h. Vital signs stable. Afebrile. AAOx4. Bed locked and low. Siderails raised x2. Non-skid socks on. Call light within reach.

## 2020-03-10 NOTE — ASSESSMENT & PLAN NOTE
Resolved.  The following replacement and diuretics held on admit.  Resume diuretic as needed  Trending renal function.    Avoid aceI/ARB and nephrotoxic meds  ua note

## 2020-03-10 NOTE — PROGRESS NOTES
[facility] Hospital Medicine  Progress Note    Patient Name: Alessia Nleson  MRN: [id number]  Patient Class: IP- Inpatient   Admission Date: 3/4/2020  Length of Stay: 3 days  Attending Physician: Yolis Mcnulty MD  Primary Care Provider: Cheryl Bradley NP        Subjective:     Principal Problem:C. difficile colitis        HPI:  Alessia Nelson is a 64-year-old female with a past medical history of hypertension, hypothyroidism, hyperlipidemia, GERD, insomnia, depression, coronary artery disease, and a TIA [facility]  Patient states that collectively she has been experiencing abdominal symptoms for the past month including nausea, diarrhea, and abdominal pain. 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  Patient states that last week she began to experience diarrhea, states that it has since slowed down.  Patient endorses right upper abdominal pain, that is worse with eating.  Patient states the pain is similar to her episodes of pancreatitis in the past.  Patient endorses decreased p.o. intake for the past 2 weeks.  Patient does endorse nausea, denies vomiting, states that she has been taking Zofran at home.  Patient endorses that her last bowel movement was yesterday, increased flatulence.  Patient also endorses that her urine looks like tea.  Patient states her last colonoscopy was in 2014, patient does endorse that she was diagnosed with gastric ulcers approximately 20 years ago.  Patient denies rectal bleeding, fever, alcohol use, new medication, or recent travel.  Patient also states that she recently underwent a right knee replacement in December of 2019 per Dr. Briceño.  Patient endorses right knee swelling and pain with movement and physical therapy.  Patient states today the pain became intolerable to the posterior aspect of her right knee after she was sweeping and mopping her  home.  Patient states I could barely walk.  In the emergency room initial workup revealed acute kidney injury with possible enterocolitis on the CT of abdomen and pelvis.  Patient was given 2 L IV normal saline in the emergency room.  Patient placed in observation on 03/04/2020 at approximately 10:30 p.m..  Patient not accompanied by any visitors during my initial interview or physical exam.  Patient states that she resides at home by herself, denies use of ambulatory assist devices or supplemental oxygen.      Overview/Hospital Course:  No notes on file    Interval History: *pt c/o pain right hip -and right leg swelling     Us leg recently negative --on lovenox for VTE       Review of Systems   Constitutional: Negative for chills and fever.   Respiratory: Negative for cough and shortness of breath.    Cardiovascular: Negative for chest pain and leg swelling.   Gastrointestinal: Positive for diarrhea.   Genitourinary: Negative for difficulty urinating and dysuria.   Musculoskeletal: Positive for arthralgias.   Neurological: Positive for weakness. Negative for dizziness.     Objective:     Vital Signs (Most Recent):  Temp: 96.9 °F (36.1 °C) (03/09/20 1132)  Pulse: (!) 59 (03/09/20 1132)  Resp: 18 (03/09/20 1132)  BP: (!) 157/68 (03/09/20 1132)  SpO2: 97 % (03/09/20 1132) Vital Signs (24h Range):  Temp:  [96.9 °F (36.1 °C)-98.5 °F (36.9 °C)] 96.9 °F (36.1 °C)  Pulse:  [55-63] 59  Resp:  [16-18] 18  SpO2:  [94 %-97 %] 97 %  BP: (147-191)/(65-84) 157/68     Weight: 85 kg (187 lb 6.4 oz)(bed scale)  Body mass index is 29.35 kg/m².    Intake/Output Summary (Last 24 hours) at 3/9/2020 1650  Last data filed at 3/8/2020 1800  Gross per 24 hour   Intake 920 ml   Output --   Net 920 ml      Physical Exam   Constitutional: She is oriented to person, place, and time. She appears well-developed and well-nourished. No distress.   Chronically ill-appearing female   HENT:   Head: Normocephalic and atraumatic.   Eyes: Pupils are  equal, round, and reactive to light. EOM are normal. Right eye exhibits no discharge. Left eye exhibits no discharge.   Neck: Normal range of motion. No JVD present.   Cardiovascular: Normal rate, regular rhythm, normal heart sounds and intact distal pulses.   No murmur heard.  Pulmonary/Chest: Effort normal and breath sounds normal. No respiratory distress. She has no wheezes. She has no rales. She exhibits no tenderness.   On room air   Abdominal: Soft. Bowel sounds are normal. She exhibits no distension. There is no tenderness. There is no rebound and no guarding.   Generalized soreness upon palpation but no definite tenderness to deep palpation or any peritoneal signs or guarding present.   Genitourinary:   Genitourinary Comments: Exam Deferred      Musculoskeletal: Normal range of motion. She exhibits edema. She exhibits no tenderness or deformity.   Mild right knee swelling pain on lateral aspect of patella  Right leg swollen upper/lower -non pitting edema   Right hip -tender over bursa    Neurological: She is alert and oriented to person, place, and time. No cranial nerve deficit or sensory deficit.   Skin: Skin is warm and dry. Capillary refill takes 2 to 3 seconds. No rash noted. She is not diaphoretic. No erythema.   Psychiatric: She has a normal mood and affect. Her behavior is normal.   Nursing note and vitals reviewed.      Significant Labs: All pertinent labs within the past 24 hours have been reviewed.    Significant Imaging: I have reviewed and interpreted all pertinent imaging results/findings within the past 24 hours.      Assessment/Plan:      * C. difficile colitis  Continue contact isolation.  Acute, recurrent   Continue oral vancomycin antibiotic therapy.  Continue supportive care with gentle IV fluid hydration, electrolyte repletion.  Abdominal pain stable.  No abdominal distention noted.  Advanced to soft diet today.  Add Lactinex.  Consult to Infectious Disease as this appears to be a  recurrent C diff  infection and will likely need chronic therapy      Hypomagnesemia  Magnesium reviewed-   Recent Labs   Lab 03/07/20  0610 03/08/20  0516   MG 1.3* 1.0*    Follow daily Mag level and replete as needed.  Avoid oral Magnesium.           Enterocolitis  Continue IV fluid hydration.  Stool studies, review of past medical records reveals history of C diff infection in 2013.  Symptomatic management.  Cipro and Flagyl for now.  C diff antigen positive but toxin negative, PCR in process.  If PCR is positive will change medications to p.o. vancomycin.    Normocytic anemia  Monitor and transfuse if patient becomes hemodynamically unstable or H/H < 7/21  Daily CBC.  No evidence of active or acute bleeding noted on my initial interview or physical exam.      Acute kidney injury  Resolved.  The following replacement and diuretics held on admit.  Resume diuretic as needed  Trending renal function.    Avoid aceI/ARB and nephrotoxic meds  ua note        Status post right knee replacement  Ultrasound of the right lower extremity was performed, negative for acute DVT.  Patient endorses swelling and pain with movement or physical therapy.  P.r.n. Norco for pain control.  Follow up with orthopedic surgeon upon discharge from hospital.  Continue PT and OT.    CAD (coronary artery disease)  Chronic, controlled.  Will continue home medication.  Continuous telemetry monitoring.      Chronic diarrhea  Noted      Hyperlipidemia  Chronic, controlled. Will continue home medication.    Lab Results   Component Value Date    CHOL 89 (L) 07/24/2019     Lab Results   Component Value Date    HDL 40 07/24/2019     Lab Results   Component Value Date    LDLCALC 8.0 (L) 07/24/2019     Lab Results   Component Value Date    TRIG 205 (H) 07/24/2019     Lab Results   Component Value Date    CHOLHDL 44.9 07/24/2019               Hypothyroidism  Lab Results   Component Value Date    TSH 8.576 (H) 11/26/2019     Chronic, TSH elevated, f/u  outpatient      GERD (gastroesophageal reflux disease)  Chronic, controlled.  Continue PPI      Fibromyalgia  Chronic, controlled.  Will continue home medications.  Patient states she follows with pain management as an outpatient.      Mild episode of recurrent major depressive disorder  Chronic, controlled.  Will continue home medications.      Nicotine dependence  Smoking cessation counseling performed. Dangers of cigarette smoking were reviewed with patient in detail and patient was encouraged to quit. Nicotine replacement options were discussed for > 3 minutes.          COPD (chronic obstructive pulmonary disease)  Chronic, controlled.  Will continue home medication.  Duo-nebs as needed.  Monitor pulse ox        VTE Risk Mitigation (From admission, onward)         Ordered     enoxaparin injection 40 mg  Every 24 hours (non-standard times)      03/06/20 1242     IP VTE HIGH RISK PATIENT  Once      03/05/20 0223     Place ELENI hose  Until discontinued      03/05/20 0223     Place sequential compression device  Until discontinued      03/05/20 0223                      Yolis Mcnulty MD  Department of Hospital Medicine   [facility]

## 2020-03-10 NOTE — ASSESSMENT & PLAN NOTE
Resolved.  The following replacement and diuretics held on admit.  Resume diuretic as needed  Trending renal function.    Avoid aceI/ARB and nephrotoxic meds  ua not done

## 2020-03-11 ENCOUNTER — OUTSIDE PLACE OF SERVICE (OUTPATIENT)
Dept: INFECTIOUS DISEASES | Facility: CLINIC | Age: 65
End: 2020-03-11
Payer: MEDICARE

## 2020-03-11 DIAGNOSIS — A04.72 C. DIFFICILE COLITIS: ICD-10-CM

## 2020-03-11 LAB
ALBUMIN SERPL BCP-MCNC: 2.7 G/DL (ref 3.5–5.2)
ALBUMIN SERPL BCP-MCNC: 2.8 G/DL (ref 3.5–5.2)
ALP SERPL-CCNC: 105 U/L (ref 55–135)
ALT SERPL W/O P-5'-P-CCNC: 33 U/L (ref 10–44)
ANION GAP SERPL CALC-SCNC: 7 MMOL/L (ref 8–16)
ANION GAP SERPL CALC-SCNC: 8 MMOL/L (ref 8–16)
AST SERPL-CCNC: 37 U/L (ref 10–40)
BASOPHILS # BLD AUTO: 0.03 K/UL (ref 0–0.2)
BASOPHILS NFR BLD: 0.4 % (ref 0–1.9)
BILIRUB SERPL-MCNC: 0.2 MG/DL (ref 0.1–1)
BUN SERPL-MCNC: 10 MG/DL (ref 8–23)
BUN SERPL-MCNC: 9 MG/DL (ref 8–23)
CALCIUM SERPL-MCNC: 9.6 MG/DL (ref 8.7–10.5)
CALCIUM SERPL-MCNC: 9.6 MG/DL (ref 8.7–10.5)
CHLORIDE SERPL-SCNC: 105 MMOL/L (ref 95–110)
CHLORIDE SERPL-SCNC: 105 MMOL/L (ref 95–110)
CO2 SERPL-SCNC: 28 MMOL/L (ref 23–29)
CO2 SERPL-SCNC: 30 MMOL/L (ref 23–29)
CREAT SERPL-MCNC: 0.8 MG/DL (ref 0.5–1.4)
CREAT SERPL-MCNC: 0.9 MG/DL (ref 0.5–1.4)
DIFFERENTIAL METHOD: ABNORMAL
EOSINOPHIL # BLD AUTO: 0.3 K/UL (ref 0–0.5)
EOSINOPHIL NFR BLD: 4.3 % (ref 0–8)
ERYTHROCYTE [DISTWIDTH] IN BLOOD BY AUTOMATED COUNT: 15.1 % (ref 11.5–14.5)
EST. GFR  (AFRICAN AMERICAN): >60 ML/MIN/1.73 M^2
EST. GFR  (AFRICAN AMERICAN): >60 ML/MIN/1.73 M^2
EST. GFR  (NON AFRICAN AMERICAN): >60 ML/MIN/1.73 M^2
EST. GFR  (NON AFRICAN AMERICAN): >60 ML/MIN/1.73 M^2
GLUCOSE SERPL-MCNC: 115 MG/DL (ref 70–110)
GLUCOSE SERPL-MCNC: 120 MG/DL (ref 70–110)
HCT VFR BLD AUTO: 30.3 % (ref 37–48.5)
HGB BLD-MCNC: 9.6 G/DL (ref 12–16)
IMM GRANULOCYTES # BLD AUTO: 0.03 K/UL (ref 0–0.04)
LYMPHOCYTES # BLD AUTO: 2.2 K/UL (ref 1–4.8)
LYMPHOCYTES NFR BLD: 28.9 % (ref 18–48)
MAGNESIUM SERPL-MCNC: 1.1 MG/DL (ref 1.6–2.6)
MAGNESIUM SERPL-MCNC: 2.3 MG/DL (ref 1.6–2.6)
MCH RBC QN AUTO: 29.4 PG (ref 27–31)
MCHC RBC AUTO-ENTMCNC: 31.7 G/DL (ref 32–36)
MCV RBC AUTO: 93 FL (ref 82–98)
MONOCYTES # BLD AUTO: 0.6 K/UL (ref 0.3–1)
MONOCYTES NFR BLD: 7.7 % (ref 4–15)
NEUTROPHILS # BLD AUTO: 4.5 K/UL (ref 1.8–7.7)
NEUTROPHILS NFR BLD: 58.3 % (ref 38–73)
NRBC BLD-RTO: 0 /100 WBC
PHOSPHATE SERPL-MCNC: 2.8 MG/DL (ref 2.7–4.5)
PLATELET # BLD AUTO: 204 K/UL (ref 150–350)
PMV BLD AUTO: 9.5 FL (ref 9.2–12.9)
POTASSIUM SERPL-SCNC: 3.1 MMOL/L (ref 3.5–5.1)
POTASSIUM SERPL-SCNC: 3.8 MMOL/L (ref 3.5–5.1)
PROT SERPL-MCNC: 5.1 G/DL (ref 6–8.4)
RBC # BLD AUTO: 3.27 M/UL (ref 4–5.4)
SODIUM SERPL-SCNC: 141 MMOL/L (ref 136–145)
SODIUM SERPL-SCNC: 142 MMOL/L (ref 136–145)
WBC # BLD AUTO: 7.69 K/UL (ref 3.9–12.7)

## 2020-03-11 PROCEDURE — 25000003 PHARM REV CODE 250: Performed by: NURSE PRACTITIONER

## 2020-03-11 PROCEDURE — 36415 COLL VENOUS BLD VENIPUNCTURE: CPT

## 2020-03-11 PROCEDURE — 83735 ASSAY OF MAGNESIUM: CPT

## 2020-03-11 PROCEDURE — 99232 SBSQ HOSP IP/OBS MODERATE 35: CPT | Mod: S$GLB,,, | Performed by: INTERNAL MEDICINE

## 2020-03-11 PROCEDURE — 63600175 PHARM REV CODE 636 W HCPCS: Performed by: HOSPITALIST

## 2020-03-11 PROCEDURE — 85025 COMPLETE CBC W/AUTO DIFF WBC: CPT

## 2020-03-11 PROCEDURE — 94761 N-INVAS EAR/PLS OXIMETRY MLT: CPT

## 2020-03-11 PROCEDURE — 83735 ASSAY OF MAGNESIUM: CPT | Mod: 91

## 2020-03-11 PROCEDURE — 80069 RENAL FUNCTION PANEL: CPT

## 2020-03-11 PROCEDURE — 99900035 HC TECH TIME PER 15 MIN (STAT)

## 2020-03-11 PROCEDURE — 63600175 PHARM REV CODE 636 W HCPCS: Performed by: INTERNAL MEDICINE

## 2020-03-11 PROCEDURE — 80053 COMPREHEN METABOLIC PANEL: CPT

## 2020-03-11 PROCEDURE — 99232 PR SUBSEQUENT HOSPITAL CARE,LEVL II: ICD-10-PCS | Mod: S$GLB,,, | Performed by: INTERNAL MEDICINE

## 2020-03-11 PROCEDURE — 12000002 HC ACUTE/MED SURGE SEMI-PRIVATE ROOM

## 2020-03-11 PROCEDURE — 25000003 PHARM REV CODE 250: Performed by: HOSPITALIST

## 2020-03-11 PROCEDURE — S4991 NICOTINE PATCH NONLEGEND: HCPCS | Performed by: NURSE PRACTITIONER

## 2020-03-11 RX ORDER — LANOLIN ALCOHOL/MO/W.PET/CERES
800 CREAM (GRAM) TOPICAL 2 TIMES DAILY
Status: DISCONTINUED | OUTPATIENT
Start: 2020-03-11 | End: 2020-03-11

## 2020-03-11 RX ORDER — POTASSIUM CHLORIDE 7.45 MG/ML
40 INJECTION INTRAVENOUS
Status: DISCONTINUED | OUTPATIENT
Start: 2020-03-11 | End: 2020-03-11

## 2020-03-11 RX ORDER — HYDROCODONE BITARTRATE AND ACETAMINOPHEN 7.5; 325 MG/1; MG/1
1 TABLET ORAL EVERY 6 HOURS PRN
Status: DISCONTINUED | OUTPATIENT
Start: 2020-03-11 | End: 2020-03-11

## 2020-03-11 RX ORDER — LANOLIN ALCOHOL/MO/W.PET/CERES
800 CREAM (GRAM) TOPICAL 2 TIMES DAILY
Status: DISCONTINUED | OUTPATIENT
Start: 2020-03-11 | End: 2020-03-12 | Stop reason: HOSPADM

## 2020-03-11 RX ORDER — POTASSIUM CHLORIDE 20 MEQ/1
40 TABLET, EXTENDED RELEASE ORAL ONCE
Status: COMPLETED | OUTPATIENT
Start: 2020-03-11 | End: 2020-03-11

## 2020-03-11 RX ORDER — DICLOFENAC SODIUM 10 MG/G
2 GEL TOPICAL 3 TIMES DAILY
Qty: 100 G | Refills: 2 | Status: SHIPPED | OUTPATIENT
Start: 2020-03-11 | End: 2021-01-14

## 2020-03-11 RX ORDER — MAGNESIUM SULFATE HEPTAHYDRATE 40 MG/ML
4 INJECTION, SOLUTION INTRAVENOUS
Status: DISCONTINUED | OUTPATIENT
Start: 2020-03-11 | End: 2020-03-11

## 2020-03-11 RX ORDER — MAGNESIUM SULFATE HEPTAHYDRATE 40 MG/ML
2 INJECTION, SOLUTION INTRAVENOUS
Status: DISCONTINUED | OUTPATIENT
Start: 2020-03-11 | End: 2020-03-11

## 2020-03-11 RX ORDER — LIDOCAINE 50 MG/G
1 PATCH TOPICAL DAILY
Qty: 30 PATCH | Refills: 0 | Status: SHIPPED | OUTPATIENT
Start: 2020-03-11 | End: 2020-03-17

## 2020-03-11 RX ORDER — MORPHINE SULFATE 4 MG/ML
4 INJECTION, SOLUTION INTRAMUSCULAR; INTRAVENOUS EVERY 4 HOURS PRN
Status: DISCONTINUED | OUTPATIENT
Start: 2020-03-11 | End: 2020-03-11

## 2020-03-11 RX ORDER — FUROSEMIDE 10 MG/ML
40 INJECTION INTRAMUSCULAR; INTRAVENOUS ONCE
Status: COMPLETED | OUTPATIENT
Start: 2020-03-11 | End: 2020-03-11

## 2020-03-11 RX ORDER — HYDROCODONE BITARTRATE AND ACETAMINOPHEN 7.5; 325 MG/1; MG/1
1 TABLET ORAL EVERY 8 HOURS PRN
Status: DISCONTINUED | OUTPATIENT
Start: 2020-03-11 | End: 2020-03-12 | Stop reason: HOSPADM

## 2020-03-11 RX ORDER — LANOLIN ALCOHOL/MO/W.PET/CERES
400 CREAM (GRAM) TOPICAL 2 TIMES DAILY
Refills: 0 | COMMUNITY
Start: 2020-03-11 | End: 2021-01-25

## 2020-03-11 RX ORDER — POTASSIUM CHLORIDE 20 MEQ/1
40 TABLET, EXTENDED RELEASE ORAL 2 TIMES DAILY
Status: DISCONTINUED | OUTPATIENT
Start: 2020-03-11 | End: 2020-03-12 | Stop reason: HOSPADM

## 2020-03-11 RX ORDER — IBUPROFEN 200 MG
1 TABLET ORAL DAILY
Refills: 0
Start: 2020-03-12 | End: 2020-09-23

## 2020-03-11 RX ORDER — L. ACIDOPHILUS/L.BULGARICUS 100MM CELL
1 GRANULES IN PACKET (EA) ORAL 3 TIMES DAILY
Qty: 90 TABLET | Refills: 2 | Status: SHIPPED | OUTPATIENT
Start: 2020-03-11 | End: 2020-07-20

## 2020-03-11 RX ADMIN — LACTOBACILLUS ACIDOPHILUS / LACTOBACILLUS BULGARICUS 1 EACH: 100 MILLION CFU STRENGTH GRANULES at 04:03

## 2020-03-11 RX ADMIN — OXYBUTYNIN CHLORIDE 10 MG: 5 TABLET, EXTENDED RELEASE ORAL at 09:03

## 2020-03-11 RX ADMIN — METOPROLOL TARTRATE 50 MG: 50 TABLET, FILM COATED ORAL at 09:03

## 2020-03-11 RX ADMIN — LACTOBACILLUS ACIDOPHILUS / LACTOBACILLUS BULGARICUS 1 EACH: 100 MILLION CFU STRENGTH GRANULES at 08:03

## 2020-03-11 RX ADMIN — LACTOBACILLUS ACIDOPHILUS / LACTOBACILLUS BULGARICUS 1 EACH: 100 MILLION CFU STRENGTH GRANULES at 09:03

## 2020-03-11 RX ADMIN — Medication 125 MG: at 12:03

## 2020-03-11 RX ADMIN — LEVOTHYROXINE SODIUM 75 MCG: 50 TABLET ORAL at 05:03

## 2020-03-11 RX ADMIN — Medication 800 MG: at 04:03

## 2020-03-11 RX ADMIN — POTASSIUM CHLORIDE 40 MEQ: 1500 TABLET, EXTENDED RELEASE ORAL at 12:03

## 2020-03-11 RX ADMIN — BACLOFEN 10 MG: 10 TABLET ORAL at 08:03

## 2020-03-11 RX ADMIN — Medication 400 MG: at 09:03

## 2020-03-11 RX ADMIN — BUPROPION HYDROCHLORIDE 150 MG: 150 TABLET, EXTENDED RELEASE ORAL at 08:03

## 2020-03-11 RX ADMIN — CLOPIDOGREL BISULFATE 75 MG: 75 TABLET ORAL at 09:03

## 2020-03-11 RX ADMIN — DICLOFENAC 2 G: 10 GEL TOPICAL at 09:03

## 2020-03-11 RX ADMIN — ALLOPURINOL 300 MG: 100 TABLET ORAL at 09:03

## 2020-03-11 RX ADMIN — PANTOPRAZOLE SODIUM 40 MG: 40 TABLET, DELAYED RELEASE ORAL at 09:03

## 2020-03-11 RX ADMIN — GABAPENTIN 400 MG: 400 CAPSULE ORAL at 08:03

## 2020-03-11 RX ADMIN — HYDRALAZINE HYDROCHLORIDE 25 MG: 25 TABLET, FILM COATED ORAL at 04:03

## 2020-03-11 RX ADMIN — Medication 125 MG: at 05:03

## 2020-03-11 RX ADMIN — ENOXAPARIN SODIUM 40 MG: 100 INJECTION SUBCUTANEOUS at 04:03

## 2020-03-11 RX ADMIN — TRAZODONE HYDROCHLORIDE 100 MG: 50 TABLET ORAL at 08:03

## 2020-03-11 RX ADMIN — MAGNESIUM SULFATE 4 G: 2 INJECTION INTRAVENOUS at 09:03

## 2020-03-11 RX ADMIN — DICLOFENAC 2 G: 10 GEL TOPICAL at 04:03

## 2020-03-11 RX ADMIN — DICLOFENAC 2 G: 10 GEL TOPICAL at 08:03

## 2020-03-11 RX ADMIN — POTASSIUM CHLORIDE 20 MEQ: 1500 TABLET, EXTENDED RELEASE ORAL at 09:03

## 2020-03-11 RX ADMIN — BENAZEPRIL HYDROCHLORIDE 20 MG: 10 TABLET, COATED ORAL at 08:03

## 2020-03-11 RX ADMIN — Medication 125 MG: at 06:03

## 2020-03-11 RX ADMIN — BACLOFEN 10 MG: 10 TABLET ORAL at 09:03

## 2020-03-11 RX ADMIN — ACETAMINOPHEN 650 MG: 325 TABLET ORAL at 01:03

## 2020-03-11 RX ADMIN — POTASSIUM CHLORIDE 40 MEQ: 1500 TABLET, EXTENDED RELEASE ORAL at 08:03

## 2020-03-11 RX ADMIN — ESCITALOPRAM OXALATE 20 MG: 10 TABLET, FILM COATED ORAL at 08:03

## 2020-03-11 RX ADMIN — GABAPENTIN 400 MG: 400 CAPSULE ORAL at 09:03

## 2020-03-11 RX ADMIN — HYDRALAZINE HYDROCHLORIDE 25 MG: 25 TABLET, FILM COATED ORAL at 12:03

## 2020-03-11 RX ADMIN — FUROSEMIDE 40 MG: 10 INJECTION, SOLUTION INTRAMUSCULAR; INTRAVENOUS at 06:03

## 2020-03-11 RX ADMIN — HYDROCODONE BITARTRATE AND ACETAMINOPHEN 1 TABLET: 7.5; 325 TABLET ORAL at 08:03

## 2020-03-11 RX ADMIN — ROSUVASTATIN CALCIUM 20 MG: 20 TABLET, FILM COATED ORAL at 09:03

## 2020-03-11 RX ADMIN — NICOTINE 1 PATCH: 14 PATCH, EXTENDED RELEASE TRANSDERMAL at 09:03

## 2020-03-11 RX ADMIN — BENAZEPRIL HYDROCHLORIDE 20 MG: 10 TABLET, COATED ORAL at 09:03

## 2020-03-11 NOTE — DISCHARGE INSTRUCTIONS
Vancomycin tablets were called into your pharmacy at [facility]  125mg 4x daily x 1 week then  125mg 2x daily x 1 week then  125 mg daily x 1 week    125 mg tablets     Thank you for choosing [facility] for your medical care. The primary doctor who is taking care of you at the time of your discharge is Yolis Mcnulty MD.     Your were admitted to the hospital with C. difficile colitis.     Please note your discharge instructions, including diet/activity restrictions, follow-up appointments, and medication changes.  If you have any questions about your medical issues, prescriptions, or any other questions, please feel free to contact the [facility] Medicine Dept at [phone] and we will help.    Please direct all long term medication refills and follow up to your primary care provider, Cheryl Bradley NP. Thank you again for letting us take care of your health care needs.

## 2020-03-11 NOTE — PLAN OF CARE
Per MD rounding, patient to discharge home with home health.         03/11/20 1154   Post-Acute Status   Post-Acute Authorization Home Health/Hospice   Home Health/Hospice Status Awaiting Orders for HH

## 2020-03-11 NOTE — HOSPITAL COURSE
64 yoAdmitted with abdominal pain/weakness and dx with c diff colitis treated with po vancomycin, ivf and probiotics. Initially she was on high dose vancomycin as she had prior episodes and ID was consulted. The last episode had been 2015 so vancomycin dose decreased and taper recommended. She had seble treated with ivf and then volume overloaded so required diuresis. Initial cxr showed interstitial edema then fu resolved and her sob/cough resolved and oxygen needs returned to room air. She also developed electrolyte abnormalities which were worsened by the lasix -but normalized with replacment and were monitored. Her diarrhea resolved, she was tolerating po and ambulating so dc home with HH   Alert, 0x3, nad. Lungs clear, cor rrr, abd-soft, nontender. Ext no edema   She had knee pain /right leg edema which resolved with diuresis. VTE ruled out and orthopedic eval was done-no further needs. Pain improved with iv tylenol and topical voltaren.

## 2020-03-11 NOTE — ASSESSMENT & PLAN NOTE
Acute-2/2 volume overload for treatment TINO-noted on CXR-  Diurese w lasix and trend-improved after single dose-no evidence hypoxia/resp distress

## 2020-03-11 NOTE — PROGRESS NOTES
"Consult Note  Infectious Disease    Reason for Consult:  Recurrent C. Difficile colitis    Consult 03/10/2020  Alessia Nelson is a   64 y.o. female with chronic diarrhea, who noted increased diarrhea frequency about 2 weeks ago. She had concurrent abdominal pain, nausea. She was found to have a positive Cdiff Ag, negative toxin, positive pcr. She has been on oral Vancomycin for 4 days with improvement. Her baseline is 3-4 soft to loose stools per day. She takes Colestid to mitigate the diarrhea of a partial colon resection and lactose intolerance. She reports 4 other episodes of antibiotic related diarrhea, the last of which was 2015. Our records indicate that she had antigen pos, toxin neg and pcr negative at that time. She is tolerating a regular diet. She is still recovering from a TKA in December and is not very mobile. She is still having some abdominal discomfort and post prandial stooling.   03/11/2020 afeb,  Wbc normal. SHe ate more than 2/3 of her food today. No Bms today, but she feels "one coming" No abd pain, but some soreness on physical exam      Review of patient's allergies indicates:   Allergen Reactions    Aspirin      "Makes stomach feel like it's on fire"    Lortab [hydrocodone-acetaminophen] Itching    Phenergan [promethazine]      SEIZURES    Promethazine hcl      Past Medical History:   Diagnosis Date    Acute pancreatitis     CAD (coronary artery disease)     CHF (congestive heart failure)     COPD (chronic obstructive pulmonary disease)     Depression     Diverticulosis     Encounter for blood transfusion     GERD (gastroesophageal reflux disease)     History of blood clots     1986 AFTER BOWEL RESCETION    History of bowel resection     Hypertension     Peritonitis     Seizures     HAD A SEIZURE FROM PHENERGAN     Thyroid disease     TIA (transient ischemic attack)      Past Surgical History:   Procedure Laterality Date    ADRENAL GLAND SURGERY      APPENDECTOMY   "    BACK SURGERY      CHOLECYSTECTOMY      COLONOSCOPY      CORONARY STENT PLACEMENT      CYSTOURETHROSCOPY N/A 11/13/2019    Procedure: CYSTOURETHROSCOPY;  Surgeon: Shun Nuñez MD;  Location: [facility] OR;  Service: Urology;  Laterality: N/A;    ENDOSCOPIC ULTRASOUND OF UPPER GASTROINTESTINAL TRACT N/A 11/25/2019    Procedure: ULTRASOUND, UPPER GI TRACT, ENDOSCOPIC;  Surgeon: Alhaji Bridges MD;  [facility]  Service: Endoscopy;  Laterality: N/A;  5 day hold Plavix, Dr Jovanni Serrano - pg  PM prep    HERNIA REPAIR      HYSTERECTOMY      INCISIONAL HERNIA REPAIR      INJECTION OF ANESTHETIC AGENT AROUND NERVE Right 5/27/2019    Procedure: RIGHT L5-S3 MEDIAL BRANCH BLOCKS;  Surgeon: Sneha Aragon MD;  Location: [facility];  Service: Pain Management;  Laterality: Right;  **DO NOT STOP PLAVIX**    instestine      KNEE ARTHROPLASTY Right 12/18/2019    Procedure: ARTHROPLASTY, KNEE;  Surgeon: Ike Briceño II, MD;  Location: [facility];  Service: Orthopedics;  Laterality: Right;    KNEE SURGERY  1983    PARATHYROID GLAND SURGERY      3 surgeries    RADIOFREQUENCY ABLATION Right 6/10/2019    Procedure: Radiofrequency Ablation - RIGHT L3-5 RADIOFREQUENCY ABLATION WITH HALYARD COOLIEF THERMAL SYSTEM;  Surgeon: Sneha Aragon MD;  Location: [facility];  Service: Pain Management;  Laterality: Right;  **HOLD PLAVIX x 7 DAYS PRIOR**    UPPER GASTROINTESTINAL ENDOSCOPY       Social History     Socioeconomic History    Marital status: Single     Spouse name: Not on file    Number of children: 0    Years of education: Not on file    Highest education level: Not on file   Occupational History    Not on file   Social Needs    Financial resource strain: Not on file    Food insecurity:     Worry: Not on file     Inability: Not on file    Transportation needs:     Medical: Not on file     Non-medical: Not on file   Tobacco Use    Smoking status: Current Every Day Smoker     Packs/day: 1.00      Years: 46.00     Pack years: 46.00    Smokeless tobacco: Never Used   Substance and Sexual Activity    Alcohol use: Yes     Comment: RARELY    Drug use: No    Sexual activity: Not Currently   Lifestyle    Physical activity:     Days per week: Not on file     Minutes per session: Not on file    Stress: Not on file   Relationships    Social connections:     Talks on phone: Not on file     Gets together: Not on file     Attends Jain service: Not on file     Active member of club or organization: Not on file     Attends meetings of clubs or organizations: Not on file     Relationship status: Not on file   Other Topics Concern    Not on file   Social History Narrative    Not on file     Family History   Problem Relation Age of Onset    Stroke Maternal Grandmother     Cancer Maternal Grandfather     Stroke Mother     Heart disease Father        Pertinent medications noted:     Review of Systems:   No chills, fever, sweats,   No change in vision,   No sinus congestion,   No pain in mouth or throat. No problems with teeth, gums.  No chest pain,    No cough, sputum production, shortness of breath, dyspnea on exertion, pleurisy, hemoptysis  No nausea, vomiting  blood in stool, or focal abd pain, but does have generalized abd soreness  No dysphagia, odynophagia. Tolerating a regular diet  No dysuria, hematuria, but she does have strangury, retention,       No swelling of joints, redness of joints, injuries, or new focal pain. Has chronic back pain  No unusual headaches, dizziness, vertigo, numbness, paresthesias, neuropathy, falls  No anxiety, depression, substance abuse, sleep disturbance  No diabetes,    No bleeding, lymphadenopathy, anemia, No new rashes, lesions, or wounds   no exposure to persons on antibiotics or on chemo or with diarrhea   recent/prior steroids: po at some point after TKA  Outdoor activities:n/A  Travel: N/A  Implants: TKA  Antibiotic History: last antibiotics were in Dec with  TKA    EXAM & DIAGNOSTICS REVIEWED:   Vitals:     Temp:  [96.9 °F (36.1 °C)-98.8 °F (37.1 °C)]   Temp: 98.6 °F (37 °C) (03/11/20 1532)  Pulse: (!) 54 (03/11/20 1532)  Resp: 16 (03/11/20 1532)  BP: (!) 173/77 (03/11/20 1532)  SpO2: (!) 93 % (03/11/20 1532)    Intake/Output Summary (Last 24 hours) at 3/11/2020 1556  Last data filed at 3/11/2020 1200  Gross per 24 hour   Intake 1180 ml   Output --   Net 1180 ml       General:  In NAD. Alert and attentive, cooperative, comfortable  Eyes:  Anicteric, PERRL, EOMI  ENT:  No ulcers, exudates, thrush, nares patent,   Neck:  supple, no masses or adenopathy appreciated  Lungs: Decreased at bases. We exercised deep breathing together  Heart:  RRR, no gallop/murmur/rub noted  Abd:  Soft,mildly and generally tender to palpation, ND, normal BS, no masses or organomegaly appreciated.  :  Voids   Musc:  Joints without effusion, swelling, erythema, synovitis, muscle wasting.   Skin:  No rashes. No palmar or plantar lesions. No subungual petechiae  Wound: Right knee looks fine  Neuro:  Alert, attentive, speech fluent, face symmetric, moves all extremities, no focal weakness. Ambulatory     Psych:  Calm and cooperative  Extrem: No edema, erythema, phlebitis, cellulitis, warm and well perfused  VAD:   peripheral    Isolation:  Special contact    Lines/Tubes/Drains:    General Labs reviewed:  Recent Labs   Lab 03/09/20  0528 03/10/20  0547 03/11/20  0555   WBC 6.43 6.61 7.69   HGB 9.9* 9.4* 9.6*   HCT 31.6* 29.9* 30.3*    188 204       Recent Labs   Lab 03/09/20  0528 03/10/20  0547 03/11/20  0555 03/11/20  1500    142 142 141   K 3.3* 3.7 3.1* 3.8   * 112* 105 105   CO2 22* 24 30* 28   BUN 5* 4* 9 10   CREATININE 0.7 0.7 0.8 0.9   CALCIUM 9.3 9.5 9.6 9.6   PROT 4.9* 5.0* 5.1*  --    BILITOT 0.2 0.2 0.2  --    ALKPHOS 102 105 105  --    ALT 11 22 33  --    AST 14 43* 37  --            Micro:  Microbiology Results (last 7 days)     Procedure Component Value Units  Date/Time    Stool culture [[id number]] Collected:  03/05/20 0823    Order Status:  Completed Specimen:  Stool Updated:  03/09/20 1049     Stool Culture No Salmonella,Shigella,Vibrio,Campylobacter,Yersinia isolated.    E. coli 0157 antigen [[id number]] Collected:  03/05/20 0823    Order Status:  Completed Specimen:  Stool Updated:  03/06/20 1437     Shiga Toxin 1 E.coli Negative     Shiga Toxin 2 E.coli Negative    C Diff Toxin by PCR [[id number]]  (Abnormal) Collected:  03/05/20 0823    Order Status:  Completed Updated:  03/05/20 2027     C. diff PCR Positive    Clostridium difficile EIA [[id number]]  (Abnormal) Collected:  03/05/20 0823    Order Status:  Completed Specimen:  Stool Updated:  03/05/20 1149     C. diff Antigen Positive     C difficile Toxins A+B, EIA Negative     Comment: Testing not recommended for children <24 months old.           Imaging Reviewed:   CXR 03/04Pulmonary interstitial disease with differential to include edema, pneumonitis, atypical infection.      Antibiotics (From admission, onward)    Start     Stop Route Frequency Ordered    03/24/20 0900  vancomycin 250mg / 10ml oral suspension 125 mg      03/31 0859 Oral Daily 03/10/20 1035    03/17/20 1200  vancomycin 250mg / 10ml oral suspension 125 mg      03/24 0859 Oral 2 times daily 03/10/20 1035    03/10/20 1200  vancomycin 250mg / 10ml oral suspension 125 mg      03/17 1159 Oral Every 6 hours 03/10/20 1035        Antifungals (From admission, onward)    None        Antivirals (From admission, onward)    None            IMPRESSION & PLAN     1. Clostridium difficile colitis, improving   The 5th episode in her life she recalls, the last of which was 2015(Ag pos, tox neg, pcr neg). Most recent antibiotics were in December with knee replacement  2. Chronic diarrhea due to prior colon resection  3. S/p right TKA in December with poor mobility  4. Urinary retention, by history and urethral stricture on cystoscopy 11/2019  5. Smoker. I advised  against    Recommendations:  Po vancomycin 125 mg Po QID x 14d with short taper  Bid x 7d and daily x 7d then stop(would check medication coverage for this prior to discharge)  F/u primary care  Probiotics indefinitely  Continue colestid for diarrhea(same function as cholestyramine)  (hosp does not have colestid, and she received  Cholestyramine in house)  Ok to discharge when you deem her physically able    PVR = 16 ml Continue care by urologic provider, Dr. Nuñez

## 2020-03-11 NOTE — PROGRESS NOTES
[facility] Hospital Medicine  Progress Note    Patient Name: Alessia Nelson  MRN: [id number]  Patient Class: IP- Inpatient   Admission Date: 3/4/2020  Length of Stay: 5 days  Attending Physician: Yolis Mcnulty MD  Primary Care Provider: Cheryl Bradley NP        Subjective:     Principal Problem:C. difficile colitis        HPI:  Alessia Nelson is a 64-year-old female with a past medical history of hypertension, hypothyroidism, hyperlipidemia, GERD, insomnia, depression, coronary artery disease, and a T[facility]  Patient states that collectively she has been experiencing abdominal symptoms for the past month including nausea, diarrhea, and abdominal pain. 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  Patient states that last week she began to experience diarrhea, states that it has since slowed down.  Patient endorses right upper abdominal pain, that is worse with eating.  Patient states the pain is similar to her episodes of pancreatitis in the past.  Patient endorses decreased p.o. intake for the past 2 weeks.  Patient does endorse nausea, denies vomiting, states that she has been taking Zofran at home.  Patient endorses that her last bowel movement was yesterday, increased flatulence.  Patient also endorses that her urine looks like tea.  Patient states her last colonoscopy was in 2014, patient does endorse that she was diagnosed with gastric ulcers approximately 20 years ago.  Patient denies rectal bleeding, fever, alcohol use, new medication, or recent travel.  Patient also states that she recently underwent a right knee replacement in December of 2019 per Dr. Briceño.  Patient endorses right knee swelling and pain with movement and physical therapy.  Patient states today the pain became intolerable to the posterior aspect of her right knee after she was sweeping and mopping her  home.  Patient states I could barely walk.  In the emergency room initial workup revealed acute kidney injury with possible enterocolitis on the CT of abdomen and pelvis.  Patient was given 2 L IV normal saline in the emergency room.  Patient placed in observation on 03/04/2020 at approximately 10:30 p.m..  Patient not accompanied by any visitors during my initial interview or physical exam.  Patient states that she resides at home by herself, denies use of ambulatory assist devices or supplemental oxygen.      Overview/Hospital Course:  64 yoAdmitted with     No new subjective & objective note has been filed under this hospital service since the last note was generated.      Assessment/Plan:      * C. difficile colitis  Continue contact isolation.  Acute, recurrent -reviewed ID note and adjusted vancomycin   Continue oral vancomycin antibiotic therapy.  Continue supportive care with gentle IV fluid hydration, electrolyte repletion. -completed    Abdominal pain stable.  No abdominal distention noted.  Advanced to soft diet today.  Add Lactinex./cholestyramine for chronic diarreha   Consult to Infectious Disease as this appears to be a recurrent C diff  Infection-noted       Pulmonary edema, acute  Acute-2/2 volume overload for treatment TINO-noted on CXR-    Diurese w lasix and trend-improved after single dose-no evidence hypoxia/resp distress -extra dose lasix iv today and trend cxr       Hypomagnesemia  Magnesium reviewed-   Recent Labs   Lab 03/07/20  0610 03/08/20  0516   MG 1.3* 1.0*    Follow daily Mag level and replete as needed.  Avoid oral Magnesium.           Enterocolitis  Continue IV fluid hydration.  Stool studies, review of past medical records reveals history of C diff infection in 2013.  Symptomatic management.  Cipro and Flagyl for now.  C diff antigen positive but toxin negative, PCR in process.  If PCR is positive will change medications to p.o. vancomycin.    Normocytic anemia  Monitor and  transfuse if patient becomes hemodynamically unstable or H/H < 7/21  Daily CBC.  No evidence of active or acute bleeding noted on my initial interview or physical exam.      Acute kidney injury  Resolved.  The following replacement and diuretics held on admit.  Resume diuretic as needed  Trending renal function.    Avoid aceI/ARB and nephrotoxic meds  ua not done         Status post right knee replacement  Ultrasound of the right lower extremity was performed, negative for acute DVT.  Patient endorses swelling and pain with movement or physical therapy.  P.r.nSyed Kelleyco for pain control.  Follow up with orthopedic surgeon upon discharge from hospital.  Continue PT and OT.    CAD (coronary artery disease)  Chronic, controlled.  Will continue home medication.  Continuous telemetry monitoring.      Chronic diarrhea  Noted      Hyperlipidemia  Chronic, controlled. Will continue home medication.    Lab Results   Component Value Date    CHOL 89 (L) 07/24/2019     Lab Results   Component Value Date    HDL 40 07/24/2019     Lab Results   Component Value Date    LDLCALC 8.0 (L) 07/24/2019     Lab Results   Component Value Date    TRIG 205 (H) 07/24/2019     Lab Results   Component Value Date    CHOLHDL 44.9 07/24/2019               Hypothyroidism  Lab Results   Component Value Date    TSH 8.576 (H) 11/26/2019     Chronic, TSH elevated, f/u outpatient      GERD (gastroesophageal reflux disease)  Chronic, controlled.  Continue PPI      Fibromyalgia  Chronic, controlled.  Will continue home medications.  Patient states she follows with pain management as an outpatient.      Mild episode of recurrent major depressive disorder  Chronic, controlled.  Will continue home medications.      Nicotine dependence  Smoking cessation counseling performed. Dangers of cigarette smoking were reviewed with patient in detail and patient was encouraged to quit. Nicotine replacement options were discussed for > 3 minutes.          COPD (chronic  obstructive pulmonary disease)  Chronic, controlled.  Will continue home medication.  Duo-nebs as needed.  Monitor pulse ox  CXR--interstitial dis -possible edema ; dc ivf/ lasix x 1 dose and re-eval   miniml rales/ no wheezing after lasix         VTE Risk Mitigation (From admission, onward)         Ordered     enoxaparin injection 40 mg  Every 24 hours (non-standard times)      03/06/20 1242     IP VTE HIGH RISK PATIENT  Once      03/05/20 0223     Place ELENI hose  Until discontinued      03/05/20 0223     Place sequential compression device  Until discontinued      03/05/20 0223                      Yolis Mcnulty MD  Department of Hospital Medicine   [facility]

## 2020-03-11 NOTE — ASSESSMENT & PLAN NOTE
Chronic, controlled.  Will continue home medication.  Duo-nebs as needed.  Monitor pulse ox  CXR--interstitial dis -possible edema ; dc ivf/ lasix x 1 dose and re-eval   miniml rales/ no wheezing after lasix

## 2020-03-11 NOTE — PLAN OF CARE
· 3/11/2020 1:57:31 PM Accepted  [email or web address]     03/11/20 1358   Post-Acute Status   Post-Acute Authorization Home Health/Hospice   Home Health/Hospice Status Set-up Complete   Discharge Delays None known at this time

## 2020-03-11 NOTE — ASSESSMENT & PLAN NOTE
Acute-2/2 volume overload for treatment TINO-noted on CXR-    Diurese w lasix and trend-improved after single dose-no evidence hypoxia/resp distress -extra dose lasix iv today and trend cxr

## 2020-03-11 NOTE — PLAN OF CARE
SW sent patient information to [facility] via [facility] system for authorization and acceptance.       03/11/20 4638   Post-Acute Status   Post-Acute Authorization Home Health/Hospice   Home Health/Hospice Status Referrals Sent

## 2020-03-11 NOTE — SUBJECTIVE & OBJECTIVE
Interval History: -had cough and reports trouble sleeping overnight today difficulty breathing.  CXR done -- Improved after Lasix.    Review of Systems   Constitutional: Positive for fever. Negative for chills.   Respiratory: Negative for cough and shortness of breath.    Cardiovascular: Negative for chest pain and leg swelling.   Gastrointestinal: Negative for abdominal distention and anal bleeding.     Objective:     Vital Signs (Most Recent):  Temp: 96.9 °F (36.1 °C) (03/10/20 1626)  Pulse: (!) 51 (03/10/20 1626)  Resp: 14 (03/10/20 1626)  BP: (!) 147/64 (03/10/20 1626)  SpO2: 96 % (03/10/20 1626) Vital Signs (24h Range):  Temp:  [96.9 °F (36.1 °C)-97.7 °F (36.5 °C)] 96.9 °F (36.1 °C)  Pulse:  [50-54] 51  Resp:  [14-20] 14  SpO2:  [95 %-98 %] 96 %  BP: (147-193)/(64-82) 147/64     Weight: 92.5 kg (203 lb 14.8 oz)(bed)  Body mass index is 31.94 kg/m².    Intake/Output Summary (Last 24 hours) at 3/10/2020 1934  Last data filed at 3/10/2020 1800  Gross per 24 hour   Intake 840 ml   Output 1 ml   Net 839 ml      Physical Exam   Constitutional: She is oriented to person, place, and time. She appears well-developed and well-nourished. No distress.   Chronically ill-appearing female   HENT:   Head: Normocephalic and atraumatic.   Eyes: Pupils are equal, round, and reactive to light. EOM are normal. Right eye exhibits no discharge. Left eye exhibits no discharge.   Neck: Normal range of motion. No JVD present.   Cardiovascular: Normal rate, regular rhythm, normal heart sounds and intact distal pulses.   No murmur heard.  Pulmonary/Chest: Effort normal and breath sounds normal. No respiratory distress. She has no wheezes. She has no rales. She exhibits no tenderness.   On room air   Abdominal: Soft. Bowel sounds are normal. She exhibits no distension. There is no tenderness. There is no rebound and no guarding.   Generalized soreness upon palpation but no definite tenderness to deep palpation or any peritoneal signs or  guarding present.   Genitourinary:   Genitourinary Comments: Exam Deferred      Musculoskeletal: Normal range of motion. She exhibits edema. She exhibits no tenderness or deformity.   Mild right knee swelling pain on lateral aspect of patella  Right leg swollen upper/lower -non pitting edema   Right hip -tender over bursa    Neurological: She is alert and oriented to person, place, and time. No cranial nerve deficit or sensory deficit.   Skin: Skin is warm and dry. Capillary refill takes 2 to 3 seconds. No rash noted. She is not diaphoretic. No erythema.   Psychiatric: She has a normal mood and affect. Her behavior is normal.   Nursing note and vitals reviewed.      Significant Labs: Troponin: No results for input(s): TROPONINI in the last 48 hours.  All pertinent labs within the past 24 hours have been reviewed.    Significant Imaging: I have reviewed and interpreted all pertinent imaging results/findings within the past 24 hours.

## 2020-03-11 NOTE — SUBJECTIVE & OBJECTIVE
Interval History:   Feels very weak --diarrhea improving  Cough /sob and leg swelling better   Refused IV potassium replacement 2/ 2burning    Review of Systems   Constitutional: Positive for fever. Negative for chills.   Respiratory: Negative for cough and shortness of breath.    Cardiovascular: Negative for chest pain and leg swelling.   Gastrointestinal: Negative for abdominal distention and anal bleeding.     Objective:     Vital Signs (Most Recent):  Temp: 98.8 °F (37.1 °C) (03/11/20 1216)  Pulse: (!) 55 (03/11/20 1216)  Resp: 20 (03/11/20 1216)  BP: 138/66 (03/11/20 1216)  SpO2: 95 % (03/11/20 1216) Vital Signs (24h Range):  Temp:  [96.9 °F (36.1 °C)-98.8 °F (37.1 °C)] 98.8 °F (37.1 °C)  Pulse:  [51-66] 55  Resp:  [14-20] 20  SpO2:  [93 %-96 %] 95 %  BP: (138-205)/(63-86) 138/66     Weight: 92.5 kg (203 lb 14.8 oz)(bed)  Body mass index is 31.94 kg/m².    Intake/Output Summary (Last 24 hours) at 3/11/2020 1447  Last data filed at 3/11/2020 1200  Gross per 24 hour   Intake 1180 ml   Output --   Net 1180 ml      Physical Exam   Constitutional: She is oriented to person, place, and time. She appears well-developed and well-nourished. No distress.   Chronically ill-appearing female   HENT:   Head: Normocephalic and atraumatic.   Eyes: Pupils are equal, round, and reactive to light. EOM are normal. Right eye exhibits no discharge. Left eye exhibits no discharge.   Neck: Normal range of motion. No JVD present.   Cardiovascular: Normal rate, regular rhythm, normal heart sounds and intact distal pulses.   No murmur heard.  Pulmonary/Chest: Effort normal. No respiratory distress. She has no wheezes. She has no rales. She exhibits no tenderness.   On room air   Abdominal: Soft. Bowel sounds are normal. She exhibits no distension. There is no tenderness. There is no rebound and no guarding.   Generalized soreness upon palpation but no definite tenderness to deep palpation or any peritoneal signs or guarding present.    Genitourinary:   Genitourinary Comments: Exam Deferred      Musculoskeletal: Normal range of motion. She exhibits edema. She exhibits no tenderness or deformity.   Mild right knee swelling pain on lateral aspect of patella  Right leg swollen upper/lower -non pitting edema   Right hip -tender over bursa    Neurological: She is alert and oriented to person, place, and time. No cranial nerve deficit or sensory deficit.   Skin: Skin is warm and dry. Capillary refill takes 2 to 3 seconds. No rash noted. She is not diaphoretic. No erythema.   Psychiatric: She has a normal mood and affect. Her behavior is normal.   Nursing note and vitals reviewed.      Significant Labs:   CBC:   Recent Labs   Lab 03/10/20  0547 03/11/20  0555   WBC 6.61 7.69   HGB 9.4* 9.6*   HCT 29.9* 30.3*    204     CMP:   Recent Labs   Lab 03/10/20  0547 03/11/20  0555    142   K 3.7 3.1*   * 105   CO2 24 30*    120*   BUN 4* 9   CREATININE 0.7 0.8   CALCIUM 9.5 9.6   PROT 5.0* 5.1*   ALBUMIN 2.6* 2.7*   BILITOT 0.2 0.2   ALKPHOS 105 105   AST 43* 37   ALT 22 33   ANIONGAP 6* 7*   EGFRNONAA >60 >60       Significant Imaging: I have reviewed and interpreted all pertinent imaging results/findings within the past 24 hours.

## 2020-03-11 NOTE — PROGRESS NOTES
[facility] Hospital Medicine  Progress Note    Patient Name: Alessia Nelson  MRN: [id number]  Patient Class: IP- Inpatient   Admission Date: 3/4/2020  Length of Stay: 5 days  Attending Physician: Yolis Mcnulty MD  Primary Care Provider: Cheryl Bradley NP        Subjective:     Principal Problem:C. difficile colitis        HPI:  Alessia Nelson is a 64-year-old female with a past medical history of hypertension, hypothyroidism, hyperlipidemia, GERD, insomnia, depression, coronary artery disease, and a [facility]  Patient states that collectively she has been experiencing abdominal symptoms for the past month including nausea, diarrhea, and abdominal pain. 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  Patient states that last week she began to experience diarrhea, states that it has since slowed down.  Patient endorses right upper abdominal pain, that is worse with eating.  Patient states the pain is similar to her episodes of pancreatitis in the past.  Patient endorses decreased p.o. intake for the past 2 weeks.  Patient does endorse nausea, denies vomiting, states that she has been taking Zofran at home.  Patient endorses that her last bowel movement was yesterday, increased flatulence.  Patient also endorses that her urine looks like tea.  Patient states her last colonoscopy was in 2014, patient does endorse that she was diagnosed with gastric ulcers approximately 20 years ago.  Patient denies rectal bleeding, fever, alcohol use, new medication, or recent travel.  Patient also states that she recently underwent a right knee replacement in December of 2019 per Dr. Briceño.  Patient endorses right knee swelling and pain with movement and physical therapy.  Patient states today the pain became intolerable to the posterior aspect of her right knee after she was sweeping and mopping her  home.  Patient states I could barely walk.  In the emergency room initial workup revealed acute kidney injury with possible enterocolitis on the CT of abdomen and pelvis.  Patient was given 2 L IV normal saline in the emergency room.  Patient placed in observation on 03/04/2020 at approximately 10:30 p.m..  Patient not accompanied by any visitors during my initial interview or physical exam.  Patient states that she resides at home by herself, denies use of ambulatory assist devices or supplemental oxygen.      Overview/Hospital Course:  64 yoAdmitted with     Interval History:   Feels very weak --diarrhea improving  Cough /sob and leg swelling better   Refused IV potassium replacement 2/ 2burning    Review of Systems   Constitutional: Positive for fever. Negative for chills.   Respiratory: Negative for cough and shortness of breath.    Cardiovascular: Negative for chest pain and leg swelling.   Gastrointestinal: Negative for abdominal distention and anal bleeding.     Objective:     Vital Signs (Most Recent):  Temp: 98.8 °F (37.1 °C) (03/11/20 1216)  Pulse: (!) 55 (03/11/20 1216)  Resp: 20 (03/11/20 1216)  BP: 138/66 (03/11/20 1216)  SpO2: 95 % (03/11/20 1216) Vital Signs (24h Range):  Temp:  [96.9 °F (36.1 °C)-98.8 °F (37.1 °C)] 98.8 °F (37.1 °C)  Pulse:  [51-66] 55  Resp:  [14-20] 20  SpO2:  [93 %-96 %] 95 %  BP: (138-205)/(63-86) 138/66     Weight: 92.5 kg (203 lb 14.8 oz)(bed)  Body mass index is 31.94 kg/m².    Intake/Output Summary (Last 24 hours) at 3/11/2020 1447  Last data filed at 3/11/2020 1200  Gross per 24 hour   Intake 1180 ml   Output --   Net 1180 ml      Physical Exam   Constitutional: She is oriented to person, place, and time. She appears well-developed and well-nourished. No distress.   Chronically ill-appearing female   HENT:   Head: Normocephalic and atraumatic.   Eyes: Pupils are equal, round, and reactive to light. EOM are normal. Right eye exhibits no discharge. Left eye exhibits no  discharge.   Neck: Normal range of motion. No JVD present.   Cardiovascular: Normal rate, regular rhythm, normal heart sounds and intact distal pulses.   No murmur heard.  Pulmonary/Chest: Effort normal. No respiratory distress. She has no wheezes. She has no rales. She exhibits no tenderness.   On room air   Abdominal: Soft. Bowel sounds are normal. She exhibits no distension. There is no tenderness. There is no rebound and no guarding.   Generalized soreness upon palpation but no definite tenderness to deep palpation or any peritoneal signs or guarding present.   Genitourinary:   Genitourinary Comments: Exam Deferred      Musculoskeletal: Normal range of motion. She exhibits edema. She exhibits no tenderness or deformity.   Mild right knee swelling pain on lateral aspect of patella  Right leg swollen upper/lower -non pitting edema   Right hip -tender over bursa    Neurological: She is alert and oriented to person, place, and time. No cranial nerve deficit or sensory deficit.   Skin: Skin is warm and dry. Capillary refill takes 2 to 3 seconds. No rash noted. She is not diaphoretic. No erythema.   Psychiatric: She has a normal mood and affect. Her behavior is normal.   Nursing note and vitals reviewed.      Significant Labs:   CBC:   Recent Labs   Lab 03/10/20  0547 03/11/20  0555   WBC 6.61 7.69   HGB 9.4* 9.6*   HCT 29.9* 30.3*    204     CMP:   Recent Labs   Lab 03/10/20  0547 03/11/20  0555    142   K 3.7 3.1*   * 105   CO2 24 30*    120*   BUN 4* 9   CREATININE 0.7 0.8   CALCIUM 9.5 9.6   PROT 5.0* 5.1*   ALBUMIN 2.6* 2.7*   BILITOT 0.2 0.2   ALKPHOS 105 105   AST 43* 37   ALT 22 33   ANIONGAP 6* 7*   EGFRNONAA >60 >60       Significant Imaging: I have reviewed and interpreted all pertinent imaging results/findings within the past 24 hours.      Assessment/Plan:      * C. difficile colitis  Continue contact isolation.  Acute, recurrent -reviewed ID note and adjusted vancomycin    Continue oral vancomycin antibiotic therapy.  Continue supportive care with gentle IV fluid hydration, electrolyte repletion. -completed    Abdominal pain stable.  No abdominal distention noted.  Advanced to soft diet today.  Add Lactinex./cholestyramine for chronic diarreha   Consult to Infectious Disease as this appears to be a recurrent C diff  Infection-noted       Pulmonary edema, acute  Acute-2/2 volume overload for treatment TINO-noted on CXR-    Diurese w lasix and trend-improved after single dose-no evidence hypoxia/resp distress -extra dose lasix iv today and trend cxr       Hypomagnesemia  Magnesium reviewed-   Recent Labs   Lab 03/07/20  0610 03/08/20  0516   MG 1.3* 1.0*    Follow daily Mag level and replete as needed.  Avoid oral Magnesium.           Enterocolitis  Continue IV fluid hydration.  Stool studies, review of past medical records reveals history of C diff infection in 2013.  Symptomatic management.  Cipro and Flagyl for now.  C diff antigen positive but toxin negative, PCR in process.  If PCR is positive will change medications to p.o. vancomycin.    Normocytic anemia  Monitor and transfuse if patient becomes hemodynamically unstable or H/H < 7/21  Daily CBC.  No evidence of active or acute bleeding noted on my initial interview or physical exam.      Acute kidney injury  Resolved.  The following replacement and diuretics held on admit.  Resume diuretic as needed  Trending renal function.    Avoid aceI/ARB and nephrotoxic meds  ua not done         Status post right knee replacement  Ultrasound of the right lower extremity was performed, negative for acute DVT.  Patient endorses swelling and pain with movement or physical therapy.  P.r.n. Norco for pain control.  Follow up with orthopedic surgeon upon discharge from hospital.  Continue PT and OT.    CAD (coronary artery disease)  Chronic, controlled.  Will continue home medication.  Continuous telemetry monitoring.      Chronic  diarrhea  Noted      Hyperlipidemia  Chronic, controlled. Will continue home medication.    Lab Results   Component Value Date    CHOL 89 (L) 07/24/2019     Lab Results   Component Value Date    HDL 40 07/24/2019     Lab Results   Component Value Date    LDLCALC 8.0 (L) 07/24/2019     Lab Results   Component Value Date    TRIG 205 (H) 07/24/2019     Lab Results   Component Value Date    CHOLHDL 44.9 07/24/2019               Hypothyroidism  Lab Results   Component Value Date    TSH 8.576 (H) 11/26/2019     Chronic, TSH elevated, f/u outpatient      GERD (gastroesophageal reflux disease)  Chronic, controlled.  Continue PPI      Fibromyalgia  Chronic, controlled.  Will continue home medications.  Patient states she follows with pain management as an outpatient.      Mild episode of recurrent major depressive disorder  Chronic, controlled.  Will continue home medications.      Nicotine dependence  Smoking cessation counseling performed. Dangers of cigarette smoking were reviewed with patient in detail and patient was encouraged to quit. Nicotine replacement options were discussed for > 3 minutes.          COPD (chronic obstructive pulmonary disease)  Chronic, controlled.  Will continue home medication.  Duo-nebs as needed.  Monitor pulse ox  CXR--interstitial dis -possible edema ; dc ivf/ lasix x 1 dose and re-eval   miniml rales/ no wheezing after lasix         VTE Risk Mitigation (From admission, onward)         Ordered     enoxaparin injection 40 mg  Every 24 hours (non-standard times)      03/06/20 1242     IP VTE HIGH RISK PATIENT  Once      03/05/20 0223     Place ELENI hose  Until discontinued      03/05/20 0223     Place sequential compression device  Until discontinued      03/05/20 0223                      Yolis Mcnulty MD  Department of Hospital Medicine   [facility]

## 2020-03-11 NOTE — NURSING
Patient stated she would like to speak with Dr. Mcnulty prior to d/c. Sent Dr. Mcnulty a secure chat letting her know.    1430- Dr. Mcnulty at bedside speaking with pt.

## 2020-03-11 NOTE — PLAN OF CARE
Patient cleared for discharge from case management standpoint.       03/11/20 1400   Final Note   Assessment Type Final Discharge Note   Anticipated Discharge Disposition Home-Health   What phone number can be called within the next 1-3 days to see how you are doing after discharge? [phone]   Hospital Follow Up  Appt(s) scheduled? Yes   Right Care Referral Info   Post Acute Recommendation Home-care   Facility Name [facility]

## 2020-03-11 NOTE — PROGRESS NOTES
[facility] Hospital Medicine  Progress Note    Patient Name: Alessia Nelson  MRN: [id number]  Patient Class: IP- Inpatient   Admission Date: 3/4/2020  Length of Stay: 4 days  Attending Physician: Yolis Mcnulty MD  Primary Care Provider: Cheryl Bradley NP        Subjective:     Principal Problem:C. difficile colitis        HPI:  Alessia Nelson is a 64-year-old female with a past medical history of hypertension, hypothyroidism, hyperlipidemia, GERD, insomnia, depression, coronary artery disease, and a TIA [facility]  Patient states that collectively she has been experiencing abdominal symptoms for the past month including nausea, diarrhea, and abdominal pain. 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  Patient states that last week she began to experience diarrhea, states that it has since slowed down.  Patient endorses right upper abdominal pain, that is worse with eating.  Patient states the pain is similar to her episodes of pancreatitis in the past.  Patient endorses decreased p.o. intake for the past 2 weeks.  Patient does endorse nausea, denies vomiting, states that she has been taking Zofran at home.  Patient endorses that her last bowel movement was yesterday, increased flatulence.  Patient also endorses that her urine looks like tea.  Patient states her last colonoscopy was in 2014, patient does endorse that she was diagnosed with gastric ulcers approximately 20 years ago.  Patient denies rectal bleeding, fever, alcohol use, new medication, or recent travel.  Patient also states that she recently underwent a right knee replacement in December of 2019 per Dr. Briceño.  Patient endorses right knee swelling and pain with movement and physical therapy.  Patient states today the pain became intolerable to the posterior aspect of her right knee after she was sweeping and mopping her  home.  Patient states I could barely walk.  In the emergency room initial workup revealed acute kidney injury with possible enterocolitis on the CT of abdomen and pelvis.  Patient was given 2 L IV normal saline in the emergency room.  Patient placed in observation on 03/04/2020 at approximately 10:30 p.m..  Patient not accompanied by any visitors during my initial interview or physical exam.  Patient states that she resides at home by herself, denies use of ambulatory assist devices or supplemental oxygen.      Overview/Hospital Course:  No notes on file    Interval History: -had cough and reports trouble sleeping overnight today difficulty breathing.  CXR done -- Improved after Lasix.    Review of Systems   Constitutional: Positive for fever. Negative for chills.   Respiratory: Negative for cough and shortness of breath.    Cardiovascular: Negative for chest pain and leg swelling.   Gastrointestinal: Negative for abdominal distention and anal bleeding.     Objective:     Vital Signs (Most Recent):  Temp: 96.9 °F (36.1 °C) (03/10/20 1626)  Pulse: (!) 51 (03/10/20 1626)  Resp: 14 (03/10/20 1626)  BP: (!) 147/64 (03/10/20 1626)  SpO2: 96 % (03/10/20 1626) Vital Signs (24h Range):  Temp:  [96.9 °F (36.1 °C)-97.7 °F (36.5 °C)] 96.9 °F (36.1 °C)  Pulse:  [50-54] 51  Resp:  [14-20] 14  SpO2:  [95 %-98 %] 96 %  BP: (147-193)/(64-82) 147/64     Weight: 92.5 kg (203 lb 14.8 oz)(bed)  Body mass index is 31.94 kg/m².    Intake/Output Summary (Last 24 hours) at 3/10/2020 1934  Last data filed at 3/10/2020 1800  Gross per 24 hour   Intake 840 ml   Output 1 ml   Net 839 ml      Physical Exam   Constitutional: She is oriented to person, place, and time. She appears well-developed and well-nourished. No distress.   Chronically ill-appearing female   HENT:   Head: Normocephalic and atraumatic.   Eyes: Pupils are equal, round, and reactive to light. EOM are normal. Right eye exhibits no discharge. Left eye exhibits no discharge.    Neck: Normal range of motion. No JVD present.   Cardiovascular: Normal rate, regular rhythm, normal heart sounds and intact distal pulses.   No murmur heard.  Pulmonary/Chest: Effort normal and breath sounds normal. No respiratory distress. She has no wheezes. She has no rales. She exhibits no tenderness.   On room air   Abdominal: Soft. Bowel sounds are normal. She exhibits no distension. There is no tenderness. There is no rebound and no guarding.   Generalized soreness upon palpation but no definite tenderness to deep palpation or any peritoneal signs or guarding present.   Genitourinary:   Genitourinary Comments: Exam Deferred      Musculoskeletal: Normal range of motion. She exhibits edema. She exhibits no tenderness or deformity.   Mild right knee swelling pain on lateral aspect of patella  Right leg swollen upper/lower -non pitting edema   Right hip -tender over bursa    Neurological: She is alert and oriented to person, place, and time. No cranial nerve deficit or sensory deficit.   Skin: Skin is warm and dry. Capillary refill takes 2 to 3 seconds. No rash noted. She is not diaphoretic. No erythema.   Psychiatric: She has a normal mood and affect. Her behavior is normal.   Nursing note and vitals reviewed.      Significant Labs: Troponin: No results for input(s): TROPONINI in the last 48 hours.  All pertinent labs within the past 24 hours have been reviewed.    Significant Imaging: I have reviewed and interpreted all pertinent imaging results/findings within the past 24 hours.      Assessment/Plan:      * C. difficile colitis  Continue contact isolation.  Acute, recurrent -reviewed ID note and adjusted vancomycin   Continue oral vancomycin antibiotic therapy.  Continue supportive care with gentle IV fluid hydration, electrolyte repletion. -completed    Abdominal pain stable.  No abdominal distention noted.  Advanced to soft diet today.  Add Lactinex./cholestyramine for chronic diarreha   Consult to  Infectious Disease as this appears to be a recurrent C diff  Infection-noted       Pulmonary edema, acute  Acute-2/2 volume overload for treatment TINO-noted on CXR-  Diurese w lasix and trend-improved after single dose-no evidence hypoxia/resp distress       Hypomagnesemia  Magnesium reviewed-   Recent Labs   Lab 03/07/20  0610 03/08/20  0516   MG 1.3* 1.0*    Follow daily Mag level and replete as needed.  Avoid oral Magnesium.           Enterocolitis  Continue IV fluid hydration.  Stool studies, review of past medical records reveals history of C diff infection in 2013.  Symptomatic management.  Cipro and Flagyl for now.  C diff antigen positive but toxin negative, PCR in process.  If PCR is positive will change medications to p.o. vancomycin.    Normocytic anemia  Monitor and transfuse if patient becomes hemodynamically unstable or H/H < 7/21  Daily CBC.  No evidence of active or acute bleeding noted on my initial interview or physical exam.      Acute kidney injury  Resolved.  The following replacement and diuretics held on admit.  Resume diuretic as needed  Trending renal function.    Avoid aceI/ARB and nephrotoxic meds  ua not done         Status post right knee replacement  Ultrasound of the right lower extremity was performed, negative for acute DVT.  Patient endorses swelling and pain with movement or physical therapy.  P.r.n. Norco for pain control.  Follow up with orthopedic surgeon upon discharge from hospital.  Continue PT and OT.    CAD (coronary artery disease)  Chronic, controlled.  Will continue home medication.  Continuous telemetry monitoring.      Chronic diarrhea  Noted      Hyperlipidemia  Chronic, controlled. Will continue home medication.    Lab Results   Component Value Date    CHOL 89 (L) 07/24/2019     Lab Results   Component Value Date    HDL 40 07/24/2019     Lab Results   Component Value Date    LDLCALC 8.0 (L) 07/24/2019     Lab Results   Component Value Date    TRIG 205 (H) 07/24/2019      Lab Results   Component Value Date    CHOLHDL 44.9 07/24/2019               Hypothyroidism  Lab Results   Component Value Date    TSH 8.576 (H) 11/26/2019     Chronic, TSH elevated, f/u outpatient      GERD (gastroesophageal reflux disease)  Chronic, controlled.  Continue PPI      Fibromyalgia  Chronic, controlled.  Will continue home medications.  Patient states she follows with pain management as an outpatient.      Mild episode of recurrent major depressive disorder  Chronic, controlled.  Will continue home medications.      Nicotine dependence  Smoking cessation counseling performed. Dangers of cigarette smoking were reviewed with patient in detail and patient was encouraged to quit. Nicotine replacement options were discussed for > 3 minutes.          COPD (chronic obstructive pulmonary disease)  Chronic, controlled.  Will continue home medication.  Duo-nebs as needed.  Monitor pulse ox  CXR--interstitial dis -possible edema ; dc ivf/ lasix x 1 dose and re-eval         VTE Risk Mitigation (From admission, onward)         Ordered     enoxaparin injection 40 mg  Every 24 hours (non-standard times)      03/06/20 1242     IP VTE HIGH RISK PATIENT  Once      03/05/20 0223     Place ELENI hose  Until discontinued      03/05/20 0223     Place sequential compression device  Until discontinued      03/05/20 0223                      Yolis Mcnulty MD  Department of Hospital Medicine   [facility]

## 2020-03-11 NOTE — RESPIRATORY THERAPY
03/10/20 1937   Patient Assessment/Suction   Level of Consciousness (AVPU) alert   Respiratory Effort Normal;Unlabored   Expansion/Accessory Muscles/Retractions expansion symmetric;no retractions;no use of accessory muscles   All Lung Fields Breath Sounds diminished   PRE-TX-O2   O2 Device (Oxygen Therapy) room air   SpO2 96 %   Pulse Oximetry Type Intermittent   Aerosol Therapy   $ Aerosol Therapy Charges PRN treatment not required   Respiratory Treatment Status (SVN) PRN treatment not required

## 2020-03-11 NOTE — RESPIRATORY THERAPY
03/11/20 0749   PRE-TX-O2   O2 Device (Oxygen Therapy) room air   SpO2 (!) 93 %   Pulse Oximetry Type Intermittent   $ Pulse Oximetry - Multiple Charge Pulse Oximetry - Multiple   Aerosol Therapy   $ Aerosol Therapy Charges PRN treatment not required   Respiratory Treatment Status (SVN) PRN treatment not required

## 2020-03-11 NOTE — PROGRESS NOTES
Consult Note  Infectious Disease    Reason for Consult:  Recurrent C. Difficile colitis    HPI: Alessia Nelson is a   64 y.o. female with chronic diarrhea, who noted increased diarrhea frequency about 2 weeks ago. She had concurrent abdominal pain, nausea. She was found to have a positive Cdiff Ag, negative toxin, positive pcr. She has been on oral Vancomycin for 4 days with improvement. Her baseline is 3-4 soft to loose stools per day. She takes Colestid to mitigate the diarrhea of a partial colon resection and lactose intolerance. She reports 4 other episodes of antibiotic related diarrhea, the last of which was 2015. Our records indicate that she had antigen pos, toxin neg and pcr negative at that time. She is tolerating a regular diet. She is still recovering from a TKA in December and is not very mobile. She is still having some abdominal discomfort and post prandial stooling.     3/10: eating well. 3 soft stools. Complains of right knee pain. PVR not done. Feels she can go home tomorrow. Tells me she was given lasix because of fluid in her lungs. Voided frequently today.     EXAM & DIAGNOSTICS REVIEWED:   Vitals:     Temp:  [96.9 °F (36.1 °C)-98.1 °F (36.7 °C)]   Temp: 96.9 °F (36.1 °C) (03/10/20 1626)  Pulse: (!) 51 (03/10/20 1626)  Resp: 14 (03/10/20 1626)  BP: (!) 147/64 (03/10/20 1626)  SpO2: 96 % (03/10/20 1626)    Intake/Output Summary (Last 24 hours) at 3/10/2020 1905  Last data filed at 3/10/2020 1800  Gross per 24 hour   Intake 840 ml   Output 1 ml   Net 839 ml       General:  In NAD. Alert and attentive, cooperative, comfortable  Eyes:  Anicteric,   EOMI  ENT:  No ulcers, exudates, thrush, nares patent,   Neck:  Supple   Lungs: Clear, no consolidation, rales, wheezes, rub  Heart:  RRR, no gallop/murmur/rub noted  Abd:  Soft,mildly and generally tender to palpation, ND, normal BS, no masses or organomegaly appreciated.  :  Voids   Musc:  Joints without effusion, swelling, erythema, synovitis,  muscle wasting.   Skin:  No rashes. No palmar or plantar lesions. No subungual petechiae  Wound: Right knee looks fine  Neuro:  Alert, attentive, speech fluent, face symmetric, moves all extremities, no focal weakness. Ambulatory     Psych:  Calm and cooperative  Extrem: No edema, erythema, phlebitis, cellulitis, warm and well perfused  VAD:   peripheral    Isolation:  Special contact    Lines/Tubes/Drains:    General Labs reviewed:  Recent Labs   Lab 03/08/20  0516 03/09/20  0528 03/10/20  0547   WBC 6.94 6.43 6.61   HGB 9.4* 9.9* 9.4*   HCT 30.3* 31.6* 29.9*    202 188       Recent Labs   Lab 03/08/20  0516 03/09/20  0528 03/10/20  0547    143 142   K 3.5 3.3* 3.7   * 114* 112*   CO2 24 22* 24   BUN 5* 5* 4*   CREATININE 0.8 0.7 0.7   CALCIUM 9.4 9.3 9.5   PROT 4.8* 4.9* 5.0*   BILITOT 0.2 0.2 0.2   ALKPHOS 101 102 105   ALT 11 11 22   AST 11 14 43*           Micro:  Microbiology Results (last 7 days)     Procedure Component Value Units Date/Time    Stool culture [[id number]] Collected:  03/05/20 0823    Order Status:  Completed Specimen:  Stool Updated:  03/09/20 1049     Stool Culture No Salmonella,Shigella,Vibrio,Campylobacter,Yersinia isolated.    E. coli 0157 antigen [[id number]] Collected:  03/05/20 0823    Order Status:  Completed Specimen:  Stool Updated:  03/06/20 1437     Shiga Toxin 1 E.coli Negative     Shiga Toxin 2 E.coli Negative    C Diff Toxin by PCR [[id number]]  (Abnormal) Collected:  03/05/20 0823    Order Status:  Completed Updated:  03/05/20 2027     C. diff PCR Positive    Clostridium difficile EIA [[id number]]  (Abnormal) Collected:  03/05/20 0823    Order Status:  Completed Specimen:  Stool Updated:  03/05/20 1149     C. diff Antigen Positive     C difficile Toxins A+B, EIA Negative     Comment: Testing not recommended for children <24 months old.           Imaging Reviewed:   CXR      Cardiology:    IMPRESSION & PLAN   1. Clostridium difficile colitis   The 5th episode in  her life she recalls, the last of which was 2015(Ag pos, tox neg, pcr neg). Most recent antibiotics were in December with knee replacement  2. Chronic diarrhea due to prior colon resection  3. S/p right TKA in December with poor mobility  4. Urinary retention, by history and urethral stricture on cystoscopy 11/2019      Recommendations:  Po vancomycin 125 mg Po QID x 14d with short taper  Bid x 7d and daily x 7d then stop(would check medication coverage for this prior to discharge)  F/u primary care  Probiotics indefinitely  Continue colestid for diarrhea(same function as cholestyramine)  (hosp does not have colestid, and I have ordered cholestyramine)  Ok to discharge when you deem her physically able, ?wed    Check post void residual. Needs to follow up with her urologic provider, Dr. Nuñez

## 2020-03-11 NOTE — PLAN OF CARE
Pt AAO, VSS. Plan of care reviewed with pt at beginning of shift.  Pt/family verbalized understanding. Pt c/o pain 8/10. One time dose of morphine and lidocaine patch ordered by NP pain moderately controlled after administration. Lopressor held for HR of 57. Hourly/ Q2 hourly rounds completed on this pt throughout shift.  Pain monitored, restroom offered, repositions independently, safety maintained.  Patient has remained free from fall/injury, no skin breakdown noted.  Side rails up x2, bed in locked and lowest position, call light kept within reach.  Needs attended to, will continue to monitor/ update as indicated.

## 2020-03-12 VITALS
WEIGHT: 203.94 LBS | BODY MASS INDEX: 32.01 KG/M2 | RESPIRATION RATE: 18 BRPM | TEMPERATURE: 98 F | SYSTOLIC BLOOD PRESSURE: 141 MMHG | DIASTOLIC BLOOD PRESSURE: 60 MMHG | HEART RATE: 52 BPM | OXYGEN SATURATION: 95 % | HEIGHT: 67 IN

## 2020-03-12 LAB
ALBUMIN SERPL BCP-MCNC: 2.9 G/DL (ref 3.5–5.2)
ALP SERPL-CCNC: 102 U/L (ref 55–135)
ALT SERPL W/O P-5'-P-CCNC: 27 U/L (ref 10–44)
ANION GAP SERPL CALC-SCNC: 5 MMOL/L (ref 8–16)
AST SERPL-CCNC: 24 U/L (ref 10–40)
BASOPHILS # BLD AUTO: 0.04 K/UL (ref 0–0.2)
BASOPHILS NFR BLD: 0.5 % (ref 0–1.9)
BILIRUB SERPL-MCNC: 0.2 MG/DL (ref 0.1–1)
BUN SERPL-MCNC: 13 MG/DL (ref 8–23)
CALCIUM SERPL-MCNC: 10.1 MG/DL (ref 8.7–10.5)
CHLORIDE SERPL-SCNC: 105 MMOL/L (ref 95–110)
CO2 SERPL-SCNC: 32 MMOL/L (ref 23–29)
CREAT SERPL-MCNC: 0.8 MG/DL (ref 0.5–1.4)
DIFFERENTIAL METHOD: ABNORMAL
EOSINOPHIL # BLD AUTO: 0.4 K/UL (ref 0–0.5)
EOSINOPHIL NFR BLD: 4.4 % (ref 0–8)
ERYTHROCYTE [DISTWIDTH] IN BLOOD BY AUTOMATED COUNT: 15.6 % (ref 11.5–14.5)
EST. GFR  (AFRICAN AMERICAN): >60 ML/MIN/1.73 M^2
EST. GFR  (NON AFRICAN AMERICAN): >60 ML/MIN/1.73 M^2
GLUCOSE SERPL-MCNC: 95 MG/DL (ref 70–110)
HCT VFR BLD AUTO: 31.7 % (ref 37–48.5)
HGB BLD-MCNC: 10.3 G/DL (ref 12–16)
IMM GRANULOCYTES # BLD AUTO: 0.03 K/UL (ref 0–0.04)
LYMPHOCYTES # BLD AUTO: 2.8 K/UL (ref 1–4.8)
LYMPHOCYTES NFR BLD: 31.7 % (ref 18–48)
MAGNESIUM SERPL-MCNC: 1.8 MG/DL (ref 1.6–2.6)
MCH RBC QN AUTO: 30.2 PG (ref 27–31)
MCHC RBC AUTO-ENTMCNC: 32.5 G/DL (ref 32–36)
MCV RBC AUTO: 93 FL (ref 82–98)
MONOCYTES # BLD AUTO: 0.8 K/UL (ref 0.3–1)
MONOCYTES NFR BLD: 9.2 % (ref 4–15)
NEUTROPHILS # BLD AUTO: 4.8 K/UL (ref 1.8–7.7)
NEUTROPHILS NFR BLD: 53.9 % (ref 38–73)
NRBC BLD-RTO: 0 /100 WBC
PLATELET # BLD AUTO: 222 K/UL (ref 150–350)
PMV BLD AUTO: 9.5 FL (ref 9.2–12.9)
POTASSIUM SERPL-SCNC: 4 MMOL/L (ref 3.5–5.1)
PROT SERPL-MCNC: 5.5 G/DL (ref 6–8.4)
RBC # BLD AUTO: 3.41 M/UL (ref 4–5.4)
SODIUM SERPL-SCNC: 142 MMOL/L (ref 136–145)
WBC # BLD AUTO: 8.82 K/UL (ref 3.9–12.7)

## 2020-03-12 PROCEDURE — 99900035 HC TECH TIME PER 15 MIN (STAT)

## 2020-03-12 PROCEDURE — 83735 ASSAY OF MAGNESIUM: CPT

## 2020-03-12 PROCEDURE — S4991 NICOTINE PATCH NONLEGEND: HCPCS | Performed by: NURSE PRACTITIONER

## 2020-03-12 PROCEDURE — 80053 COMPREHEN METABOLIC PANEL: CPT

## 2020-03-12 PROCEDURE — 36415 COLL VENOUS BLD VENIPUNCTURE: CPT

## 2020-03-12 PROCEDURE — 25000003 PHARM REV CODE 250: Performed by: HOSPITALIST

## 2020-03-12 PROCEDURE — 25000003 PHARM REV CODE 250: Performed by: NURSE PRACTITIONER

## 2020-03-12 PROCEDURE — 85025 COMPLETE CBC W/AUTO DIFF WBC: CPT

## 2020-03-12 PROCEDURE — 94761 N-INVAS EAR/PLS OXIMETRY MLT: CPT

## 2020-03-12 RX ADMIN — LACTOBACILLUS ACIDOPHILUS / LACTOBACILLUS BULGARICUS 1 EACH: 100 MILLION CFU STRENGTH GRANULES at 09:03

## 2020-03-12 RX ADMIN — BENAZEPRIL HYDROCHLORIDE 20 MG: 10 TABLET, COATED ORAL at 09:03

## 2020-03-12 RX ADMIN — GABAPENTIN 400 MG: 400 CAPSULE ORAL at 09:03

## 2020-03-12 RX ADMIN — Medication 125 MG: at 12:03

## 2020-03-12 RX ADMIN — OXYBUTYNIN CHLORIDE 10 MG: 5 TABLET, EXTENDED RELEASE ORAL at 09:03

## 2020-03-12 RX ADMIN — ALLOPURINOL 300 MG: 100 TABLET ORAL at 09:03

## 2020-03-12 RX ADMIN — BACLOFEN 10 MG: 10 TABLET ORAL at 09:03

## 2020-03-12 RX ADMIN — PANTOPRAZOLE SODIUM 40 MG: 40 TABLET, DELAYED RELEASE ORAL at 09:03

## 2020-03-12 RX ADMIN — LIDOCAINE 1 PATCH: 50 PATCH TOPICAL at 12:03

## 2020-03-12 RX ADMIN — DICLOFENAC 2 G: 10 GEL TOPICAL at 09:03

## 2020-03-12 RX ADMIN — CLOPIDOGREL BISULFATE 75 MG: 75 TABLET ORAL at 09:03

## 2020-03-12 RX ADMIN — CHOLESTYRAMINE 4 G: 4 POWDER, FOR SUSPENSION ORAL at 09:03

## 2020-03-12 RX ADMIN — LEVOTHYROXINE SODIUM 75 MCG: 50 TABLET ORAL at 06:03

## 2020-03-12 RX ADMIN — Medication 125 MG: at 06:03

## 2020-03-12 RX ADMIN — HYDROCODONE BITARTRATE AND ACETAMINOPHEN 1 TABLET: 7.5; 325 TABLET ORAL at 06:03

## 2020-03-12 RX ADMIN — ROSUVASTATIN CALCIUM 20 MG: 20 TABLET, FILM COATED ORAL at 09:03

## 2020-03-12 RX ADMIN — Medication 800 MG: at 09:03

## 2020-03-12 RX ADMIN — POTASSIUM CHLORIDE 40 MEQ: 1500 TABLET, EXTENDED RELEASE ORAL at 09:03

## 2020-03-12 RX ADMIN — NICOTINE 1 PATCH: 14 PATCH, EXTENDED RELEASE TRANSDERMAL at 09:03

## 2020-03-12 NOTE — PLAN OF CARE
Plan of care reviewed with patient. Safety precautions maintained. Contact precautions maintained. Pt remains free from injury. Cardiac monitoring maintained. Electrolytes replaced. Pain controlled with prn medication. Ambulates stand by assist/independently. Refuses TEDs and SCDs. Lovenox for VTE prevention. Tolerating diet well. Frequent checks performed q2 hours. Neuro checks performed q2h. Vital signs stable. Afebrile. AAOx4. Bed locked and low. Siderails raised x2. Non-skid socks on. Call light within reach.

## 2020-03-12 NOTE — RESPIRATORY THERAPY
03/11/20 1938   Patient Assessment/Suction   Level of Consciousness (AVPU) alert   Respiratory Effort Unlabored   Expansion/Accessory Muscles/Retractions no use of accessory muscles   All Lung Fields Breath Sounds diminished   Rhythm/Pattern, Respiratory unlabored   Cough Frequency frequent   Cough Type good   PRE-TX-O2   O2 Device (Oxygen Therapy) room air   SpO2 97 %   Pulse Oximetry Type Intermittent   $ Pulse Oximetry - Multiple Charge Pulse Oximetry - Multiple   Aerosol Therapy   $ Aerosol Therapy Charges PRN treatment not required

## 2020-03-12 NOTE — PLAN OF CARE
03/12/20 1103   Final Note   Assessment Type Final Discharge Note   Anticipated Discharge Disposition Home-Health   What phone number can be called within the next 1-3 days to see how you are doing after discharge? [phone]   Hospital Follow Up  Appt(s) scheduled? Yes   Discharge plans and expectations educations in teach back method with documentation complete? Yes   Right Care Referral Info   Post Acute Recommendation Home-care   Referral Type Home health   Facility Name [facility]

## 2020-03-12 NOTE — RESPIRATORY THERAPY
03/12/20 0824   PRE-TX-O2   O2 Device (Oxygen Therapy) room air   SpO2 (!) 94 %   Pulse Oximetry Type Intermittent   $ Pulse Oximetry - Multiple Charge Pulse Oximetry - Multiple   Aerosol Therapy   $ Aerosol Therapy Charges PRN treatment not required   Respiratory Treatment Status (SVN) PRN treatment not required

## 2020-03-12 NOTE — NURSING
"Discharge instructions, new medications, and follow up visits reviewed with patient. States "understanding". IV and telemetry removed. Pt tolerated well. Pt packed personal belongings per self. Pt transferred by staff via wheelchair to personal vehicle to home. Safety maintained. .dis  "

## 2020-03-12 NOTE — PLAN OF CARE
Pt AAO, VSS. Plan of care reviewed with pt at beginning of shift.  Pt/family verbalized understanding. Pain well managed with PRN pain meds. PM Lopressor held for HR of 57. Cardiac monitor in place showing NSR/ST.  Hourly/ Q2 hourly rounds completed on this pt throughout shift.  Pain monitored, restroom offered, repositions independently, safety maintained.  Patient has remained free from fall/injury, no skin breakdown noted.  Side rails up x2, bed in locked and lowest position, call light kept within reach.  Needs attended to, will continue to monitor/ update as indicated.

## 2020-03-14 NOTE — DISCHARGE SUMMARY
[facility] Hospital Medicine  Discharge Summary      Patient Name: Alessia Nelson  MRN: [id number]  Admission Date: 3/4/2020  Hospital Length of Stay: 8 days  Discharge Date and Time: 3/12/2020  2:00 PM  Attending Physician: Cristel att. providers found   Discharging Provider: Yolis Mcnulty MD  Primary Care Provider: Cheryl Bradley NP      HPI:   Alessia Nelson is a 64-year-old female with a past medical history of hypertension, hypothyroidism, hyperlipidemia, GERD, insomnia, depression, coronary artery disease, and a TIA [facility]  Patient states that collectively she has been experiencing abdominal symptoms for the past month including nausea, diarrhea, and abdominal pain. 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  Patient states that last week she began to experience diarrhea, states that it has since slowed down.  Patient endorses right upper abdominal pain, that is worse with eating.  Patient states the pain is similar to her episodes of pancreatitis in the past.  Patient endorses decreased p.o. intake for the past 2 weeks.  Patient does endorse nausea, denies vomiting, states that she has been taking Zofran at home.  Patient endorses that her last bowel movement was yesterday, increased flatulence.  Patient also endorses that her urine looks like tea.  Patient states her last colonoscopy was in 2014, patient does endorse that she was diagnosed with gastric ulcers approximately 20 years ago.  Patient denies rectal bleeding, fever, alcohol use, new medication, or recent travel.  Patient also states that she recently underwent a right knee replacement in December of 2019 per Dr. Briceño.  Patient endorses right knee swelling and pain with movement and physical therapy.  Patient states today the pain became intolerable to the posterior aspect of her right knee after she was sweeping and  mopping her home.  Patient states I could barely walk.  In the emergency room initial workup revealed acute kidney injury with possible enterocolitis on the CT of abdomen and pelvis.  Patient was given 2 L IV normal saline in the emergency room.  Patient placed in observation on 03/04/2020 at approximately 10:30 p.m..  Patient not accompanied by any visitors during my initial interview or physical exam.  Patient states that she resides at home by herself, denies use of ambulatory assist devices or supplemental oxygen.      * No surgery found *      Hospital Course:   64 yoAdmitted with abdominal pain/weakness and dx with c diff colitis treated with po vancomycin, ivf and probiotics. Initially she was on high dose vancomycin as she had prior episodes and ID was consulted. The last episode had been 2015 so vancomycin dose decreased and taper recommended. She had seble treated with ivf and then volume overloaded so required diuresis. Initial cxr showed interstitial edema then fu resolved and her sob/cough resolved and oxygen needs returned to room air. She also developed electrolyte abnormalities which were worsened by the lasix -but normalized with replacment and were monitored. Her diarrhea resolved, she was tolerating po and ambulating so dc home with HH   Alert, 0x3, nad. Lungs clear, cor rrr, abd-soft, nontender. Ext no edema   She had knee pain /right leg edema which resolved with diuresis. VTE ruled out and orthopedic eval was done-no further needs. Pain improved with iv tylenol and topical voltaren.        Consults:   Consults (From admission, onward)        Status Ordering Provider     Inpatient consult to Social Work/Case Management  Once     Provider:  (Not yet assigned)    Completed IRENA HERNANDEZ     Inpatient consult to Social Work/Case Management  Once     Provider:  (Not yet assigned)    Completed IRENA HERNANDEZ          No new Assessment & Plan notes have been filed under this hospital service since  the last note was generated.  Service: Hospital Medicine    Final Active Diagnoses:    Diagnosis Date Noted POA    PRINCIPAL PROBLEM:  C. difficile colitis [A04.72] 03/06/2020 Yes    Bradycardia [R00.1] 03/10/2020 No    Pulmonary edema, acute [J81.0] 03/10/2020 Yes    Normocytic anemia [D64.9] 03/05/2020 Yes    Hypomagnesemia [E83.42] 03/05/2020 Yes    Acute kidney injury [N17.9] 03/04/2020 Yes    Status post right knee replacement [Z96.651] 02/12/2020 Not Applicable    CAD (coronary artery disease) [I25.10] 12/18/2019 Yes    Chronic diarrhea [K52.9] 06/06/2019 Yes    COPD (chronic obstructive pulmonary disease) [J44.9] 12/23/2013 Yes    Nicotine dependence [F17.200] 12/23/2013 Yes    Hyperlipidemia [E78.5] 02/28/2013 Yes    Mild episode of recurrent major depressive disorder [F33.0] 04/25/2012 Yes    Fibromyalgia [M79.7] 04/25/2012 Yes    GERD (gastroesophageal reflux disease) [K21.9] 04/25/2012 Yes    Hypothyroidism [E03.9] 04/25/2012 Yes      Problems Resolved During this Admission:       Discharged Condition: good    Disposition: Home-Health Care [facility]    Follow Up:  Follow-up Information     Cheryl Bradley NP On 3/17/2020.    Specialty:  Family Medicine  Why:  hospital f/u on Tuesday, 3/17/2020 @ 10:20am  Contact information:  [address] [place] [address] [phone]             [facility].    Why:  Home Health  Contact information:  [address] [place] [address] [id number] [phone]               Patient Instructions:      Ambulatory referral/consult to Home Health   Standing Status: Future   Referral Priority: Routine Referral Type: Home Health   Referral Reason: Specialty Services Required   Requested Specialty: Home Health Services   Number of Visits Requested: 1       Significant Diagnostic Studies:   Results for DAYDAY JACKSON (MRN [id number]) as of 3/13/2020 22:13   Ref. Range 3/12/2020 05:32   WBC Latest Ref Range: 3.90 - 12.70 K/uL  8.82   RBC Latest Ref Range: 4.00 - 5.40 M/uL 3.41 (L)   Hemoglobin Latest Ref Range: 12.0 - 16.0 g/dL 10.3 (L)   Hematocrit Latest Ref Range: 37.0 - 48.5 % 31.7 (L)   MCV Latest Ref Range: 82 - 98 fL 93   MCH Latest Ref Range: 27.0 - 31.0 pg 30.2   MCHC Latest Ref Range: 32.0 - 36.0 g/dL 32.5   RDW Latest Ref Range: 11.5 - 14.5 % 15.6 (H)   Platelets Latest Ref Range: 150 - 350 K/uL 222   Results for DAYDAY JACKSON (MRN [id number]) as of 3/13/2020 22:13   Ref. Range 3/12/2020 05:32   Sodium Latest Ref Range: 136 - 145 mmol/L 142   Potassium Latest Ref Range: 3.5 - 5.1 mmol/L 4.0   Chloride Latest Ref Range: 95 - 110 mmol/L 105   CO2 Latest Ref Range: 23 - 29 mmol/L 32 (H)   Anion Gap Latest Ref Range: 8 - 16 mmol/L 5 (L)   BUN, Bld Latest Ref Range: 8 - 23 mg/dL 13   Creatinine Latest Ref Range: 0.5 - 1.4 mg/dL 0.8   eGFR if non African American Latest Ref Range: >60 mL/min/1.73 m^2 >60   eGFR if  Latest Ref Range: >60 mL/min/1.73 m^2 >60   Glucose Latest Ref Range: 70 - 110 mg/dL 95   Calcium Latest Ref Range: 8.7 - 10.5 mg/dL 10.1   Magnesium Latest Ref Range: 1.6 - 2.6 mg/dL 1.8   Alkaline Phosphatase Latest Ref Range: 55 - 135 U/L 102   PROTEIN TOTAL Latest Ref Range: 6.0 - 8.4 g/dL 5.5 (L)   Albumin Latest Ref Range: 3.5 - 5.2 g/dL 2.9 (L)   BILIRUBIN TOTAL Latest Ref Range: 0.1 - 1.0 mg/dL 0.2   AST Latest Ref Range: 10 - 40 U/L 24   ALT Latest Ref Range: 10 - 44 U/L 27      8d ago  (3/5/20) 6yr ago  (12/22/13) 6yr ago  (12/19/13)    C. diff Antigen  PositiveAbnormal   PositiveAbnormal   PositiveAbnormal     C difficile Toxins A+B, EIA Negative Negative  Negative  Negative    Comment: Testing not recommended for children <24 months old.   CTAP-  Impression       No evidence of nephrolithiasis or obstructive uropathy.    Fluid prominence of the small and large bowel loops with no transition point.  The findings may represent enterocolitis.  Suggest clinical correlation and  follow-up.    Postoperative changes as above.    Colonic diverticula without evidence of acute diverticulitis.    Chronic intraluminal lipomas of the ascending and transverse colon.    Additional findings as above.      Electronically signed by: Aubrey Wolf MD  Date: 03/04/2020  Time: 21:13         Pending Diagnostic Studies:     None         Medications:  Reconciled Home Medications:      Medication List      START taking these medications    diclofenac sodium 1 % Gel  Commonly known as:  VOLTAREN  Apply 2 g topically 3 (three) times daily.     lactobacillus acidophilus & bulgar 100 million cell packet  Commonly known as:  LACTINEX  Take 1 packet (1 each total) by mouth 3 (three) times daily.     lidocaine 5 %  Commonly known as:  LIDODERM  Place 1 patch onto the skin once daily. Remove & Discard patch within 12 hours or as directed by MD     magnesium oxide 400 mg (241.3 mg magnesium) tablet  Commonly known as:  MAG-OX  Take 1 tablet (400 mg total) by mouth 2 (two) times daily.     nicotine 14 mg/24 hr  Commonly known as:  NICODERM CQ  Place 1 patch onto the skin once daily.        CHANGE how you take these medications    benazepriL 20 MG tablet  Commonly known as:  LOTENSIN  Take 1 tablet (20 mg total) by mouth once daily.  What changed:  when to take this     buPROPion 150 MG TB24 tablet  Commonly known as:  WELLBUTRIN XL  Take 1 tablet (150 mg total) by mouth once daily.  What changed:  when to take this     gabapentin 400 MG capsule  Commonly known as:  NEURONTIN  Take 1 capsule (400 mg total) by mouth 4 (four) times daily.  What changed:  when to take this        CONTINUE taking these medications    acetaminophen 325 MG tablet  Commonly known as:  TYLENOL  Take 2 tablets (650 mg total) by mouth every 6 (six) hours as needed for Pain (Do not take with any other tylenol containing products).     allopurinoL 300 MG tablet  Commonly known as:  ZYLOPRIM  Take 1 tablet (300 mg total) by mouth once daily.      baclofen 10 MG tablet  Commonly known as:  LIORESAL  Take 10 mg by mouth 2 (two) times daily.     clopidogreL 75 mg tablet  Commonly known as:  PLAVIX  TAKE 1 TABLET (75 MG TOTAL) BY MOUTH ONCE DAILY.     escitalopram oxalate 20 MG tablet  Commonly known as:  LEXAPRO  Take 1 tablet (20 mg total) by mouth every evening.     hydroCHLOROthiazide 12.5 MG Tab  Commonly known as:  HYDRODIURIL  Take 1 tablet (12.5 mg total) by mouth once daily.     HYDROcodone-acetaminophen 7.5-325 mg per tablet  Commonly known as:  NORCO  Take 1 tablet by mouth 2 (two) times daily as needed for Pain.     levothyroxine 75 MCG tablet  Commonly known as:  SYNTHROID  Take 1 tablet (75 mcg total) by mouth once daily.     metoprolol tartrate 100 MG tablet  Commonly known as:  LOPRESSOR  Take 1 tablet (100 mg total) by mouth 2 (two) times daily.     ondansetron 4 MG tablet  Commonly known as:  ZOFRAN  Take 2 tablets (8 mg total) by mouth every 8 (eight) hours as needed for Nausea.     oxybutynin 10 MG 24 hr tablet  Commonly known as:  DITROPAN-XL  Take 1 tablet (10 mg total) by mouth once daily.     pantoprazole 40 MG tablet  Commonly known as:  PROTONIX  Take 1 tablet (40 mg total) by mouth once daily.     potassium chloride SA 20 MEQ tablet  Commonly known as:  K-DUR,KLOR-CON  Take 1 tablet (20 mEq total) by mouth once daily.     rosuvastatin 20 MG tablet  Commonly known as:  CRESTOR  Take 1 tablet (20 mg total) by mouth once daily.     traZODone 100 MG tablet  Commonly known as:  DESYREL  TAKE 1 TABLET (100 MG TOTAL) BY MOUTH EVERY EVENING.     VITAMIN B-12 1,000 mcg/mL injection  Generic drug:  cyanocobalamin  Inject 1,000 mcg as directed every 14 (fourteen) days.            Indwelling Lines/Drains at time of discharge:   Lines/Drains/Airways     None                 Time spent on the discharge of patient: 35 minutes  Patient was seen and examined on the date of discharge and determined to be suitable for discharge.         Yolis Mcnulty,  MD  Department of Hospital Medicine  [facility]

## 2020-03-16 ENCOUNTER — PATIENT OUTREACH (OUTPATIENT)
Dept: ADMINISTRATIVE | Facility: CLINIC | Age: 65
End: 2020-03-16

## 2020-03-16 NOTE — TELEPHONE ENCOUNTER
Patient Lidocaine is not covered by insurance and did not  get afford Lactinex and will purchase OTC. Please contact patient for any further questions or concerns.

## 2020-03-16 NOTE — PATIENT INSTRUCTIONS
Ischemic Colitis     Ischemic colitis happens if blood flow to a part of the colon is reduced.   Ischemic colitis happens when blood flow to the colon is reduced or blocked. Bloody diarrhea and severe belly pain are the most common symptoms. Other symptoms include vomiting, fever, and fainting. Diarrhea can lead to severe dehydration. This is the rapid loss of the fluids your body needs to function. Because of the severe pain and the risk for dehydration, ischemic colitis should be treated right away.  Causes of ischemic colitis  The cause of the reduction or blockage of blood flow to the colon is not well understood. In some cases, a sudden drop in blood pressure, or dehydration, leads to an episode. Ischemic colitis is more likely in people with blood clotting problems or heart and blood vessel disease.  Diagnosing ischemic colitis  The presence of severe belly pain and bloody diarrhea is often enough to diagnose ischemic colitis. After these symptoms are treated, a test called a colonoscopy will likely be done. This helps rule out other colon problems. The test uses a thin, flexible scope with a light and camera on the end. The scope is inserted through the rectum into the colon. The scope sends pictures from inside the colon to a video screen. A small sample of tissue (biopsy) from the colon may be taken for further testing in a lab.  Treating ischemic colitis  Episodes are treated in the hospital. You may remain in the hospital for several days or longer:  · An IV line is put into a vein in your hand or arm. You will be given fluids through the IV to treat dehydration. You are also given IV pain medicines if you need them.  · You may be given IV antibiotics (medicines that treat infection).  · To rest the bowel, you will not eat or drink for a few days. In rare cases, when symptoms are very severe, you will be given nutrition through the IV.  · If you lost a lot of blood during the episode, you may receive a  blood transfusion.  · In rare cases, an episode causes severe damage to the colon. In this case, surgery may need to be done to remove the damaged section. Your healthcare provider can tell you more if this is needed.  Follow-up  While you are being treated, your healthcare provider will work to find the cause of your ischemic colitis. After you recover, you may need to make lifestyle changes, such as quitting smoking, or take medicines to decrease your risk of another episode. Call 911 or emergency services or go to the emergency room right away if your symptoms return.  Date Last Reviewed: 7/1/2016  © 0311-6555 [employer]. [address] [place] [address], [place] [address]. All rights reserved. This information is not intended as a substitute for professional medical care. Always follow your healthcare professional's instructions.

## 2020-03-17 ENCOUNTER — OFFICE VISIT (OUTPATIENT)
Dept: FAMILY MEDICINE | Facility: CLINIC | Age: 65
End: 2020-03-17
Payer: MEDICARE

## 2020-03-17 ENCOUNTER — OFFICE VISIT (OUTPATIENT)
Dept: PAIN MEDICINE | Facility: CLINIC | Age: 65
End: 2020-03-17
Payer: MEDICARE

## 2020-03-17 VITALS
OXYGEN SATURATION: 96 % | HEIGHT: 67 IN | BODY MASS INDEX: 32.01 KG/M2 | SYSTOLIC BLOOD PRESSURE: 96 MMHG | RESPIRATION RATE: 18 BRPM | TEMPERATURE: 98 F | HEART RATE: 52 BPM | DIASTOLIC BLOOD PRESSURE: 60 MMHG | WEIGHT: 203.94 LBS

## 2020-03-17 VITALS
RESPIRATION RATE: 17 BRPM | DIASTOLIC BLOOD PRESSURE: 60 MMHG | TEMPERATURE: 97 F | SYSTOLIC BLOOD PRESSURE: 117 MMHG | OXYGEN SATURATION: 99 % | WEIGHT: 171 LBS | BODY MASS INDEX: 26.84 KG/M2 | HEIGHT: 67 IN | HEART RATE: 57 BPM

## 2020-03-17 DIAGNOSIS — M96.1 POSTLAMINECTOMY SYNDROME OF LUMBAR REGION: ICD-10-CM

## 2020-03-17 DIAGNOSIS — Z96.651 STATUS POST TOTAL RIGHT KNEE REPLACEMENT: ICD-10-CM

## 2020-03-17 DIAGNOSIS — N17.9 AKI (ACUTE KIDNEY INJURY): ICD-10-CM

## 2020-03-17 DIAGNOSIS — M70.61 GREATER TROCHANTERIC BURSITIS OF RIGHT HIP: ICD-10-CM

## 2020-03-17 DIAGNOSIS — G89.4 CHRONIC PAIN DISORDER: Primary | ICD-10-CM

## 2020-03-17 DIAGNOSIS — R10.9 BILATERAL FLANK PAIN: ICD-10-CM

## 2020-03-17 DIAGNOSIS — M79.18 MYOFASCIAL PAIN: ICD-10-CM

## 2020-03-17 DIAGNOSIS — Z79.891 OPIOID CONTRACT EXISTS: ICD-10-CM

## 2020-03-17 DIAGNOSIS — A04.72 C. DIFFICILE COLITIS: Primary | ICD-10-CM

## 2020-03-17 PROCEDURE — 3008F BODY MASS INDEX DOCD: CPT | Mod: CPTII,S$GLB,, | Performed by: NURSE PRACTITIONER

## 2020-03-17 PROCEDURE — 3078F PR MOST RECENT DIASTOLIC BLOOD PRESSURE < 80 MM HG: ICD-10-PCS | Mod: CPTII,S$GLB,, | Performed by: ANESTHESIOLOGY

## 2020-03-17 PROCEDURE — 3078F DIAST BP <80 MM HG: CPT | Mod: CPTII,S$GLB,, | Performed by: ANESTHESIOLOGY

## 2020-03-17 PROCEDURE — 99214 PR OFFICE/OUTPT VISIT, EST, LEVL IV, 30-39 MIN: ICD-10-PCS | Mod: S$GLB,,, | Performed by: NURSE PRACTITIONER

## 2020-03-17 PROCEDURE — 99999 PR PBB SHADOW E&M-EST. PATIENT-LVL V: ICD-10-PCS | Mod: PBBFAC,,, | Performed by: NURSE PRACTITIONER

## 2020-03-17 PROCEDURE — 99214 PR OFFICE/OUTPT VISIT, EST, LEVL IV, 30-39 MIN: ICD-10-PCS | Mod: S$GLB,,, | Performed by: ANESTHESIOLOGY

## 2020-03-17 PROCEDURE — 99999 PR PBB SHADOW E&M-EST. PATIENT-LVL V: CPT | Mod: PBBFAC,,, | Performed by: NURSE PRACTITIONER

## 2020-03-17 PROCEDURE — 3074F SYST BP LT 130 MM HG: CPT | Mod: CPTII,S$GLB,, | Performed by: NURSE PRACTITIONER

## 2020-03-17 PROCEDURE — 3008F PR BODY MASS INDEX (BMI) DOCUMENTED: ICD-10-PCS | Mod: CPTII,S$GLB,, | Performed by: NURSE PRACTITIONER

## 2020-03-17 PROCEDURE — 3078F PR MOST RECENT DIASTOLIC BLOOD PRESSURE < 80 MM HG: ICD-10-PCS | Mod: CPTII,S$GLB,, | Performed by: NURSE PRACTITIONER

## 2020-03-17 PROCEDURE — 3074F PR MOST RECENT SYSTOLIC BLOOD PRESSURE < 130 MM HG: ICD-10-PCS | Mod: CPTII,S$GLB,, | Performed by: ANESTHESIOLOGY

## 2020-03-17 PROCEDURE — 3074F SYST BP LT 130 MM HG: CPT | Mod: CPTII,S$GLB,, | Performed by: ANESTHESIOLOGY

## 2020-03-17 PROCEDURE — 3008F PR BODY MASS INDEX (BMI) DOCUMENTED: ICD-10-PCS | Mod: CPTII,S$GLB,, | Performed by: ANESTHESIOLOGY

## 2020-03-17 PROCEDURE — 99999 PR PBB SHADOW E&M-EST. PATIENT-LVL III: ICD-10-PCS | Mod: PBBFAC,,, | Performed by: ANESTHESIOLOGY

## 2020-03-17 PROCEDURE — 3078F DIAST BP <80 MM HG: CPT | Mod: CPTII,S$GLB,, | Performed by: NURSE PRACTITIONER

## 2020-03-17 PROCEDURE — 99214 OFFICE O/P EST MOD 30 MIN: CPT | Mod: S$GLB,,, | Performed by: NURSE PRACTITIONER

## 2020-03-17 PROCEDURE — 99999 PR PBB SHADOW E&M-EST. PATIENT-LVL III: CPT | Mod: PBBFAC,,, | Performed by: ANESTHESIOLOGY

## 2020-03-17 PROCEDURE — 3074F PR MOST RECENT SYSTOLIC BLOOD PRESSURE < 130 MM HG: ICD-10-PCS | Mod: CPTII,S$GLB,, | Performed by: NURSE PRACTITIONER

## 2020-03-17 PROCEDURE — 3008F BODY MASS INDEX DOCD: CPT | Mod: CPTII,S$GLB,, | Performed by: ANESTHESIOLOGY

## 2020-03-17 PROCEDURE — 99214 OFFICE O/P EST MOD 30 MIN: CPT | Mod: S$GLB,,, | Performed by: ANESTHESIOLOGY

## 2020-03-17 RX ORDER — LIDOCAINE 50 MG/G
2 PATCH TOPICAL DAILY
Qty: 180 PATCH | Refills: 3 | Status: SHIPPED | OUTPATIENT
Start: 2020-03-17 | End: 2021-03-12

## 2020-03-17 RX ORDER — METHOCARBAMOL 750 MG/1
TABLET, FILM COATED ORAL
COMMUNITY
Start: 2020-02-20 | End: 2020-07-20 | Stop reason: SDUPTHER

## 2020-03-17 RX ORDER — VANCOMYCIN HYDROCHLORIDE 125 MG/1
CAPSULE ORAL
COMMUNITY
Start: 2020-03-11 | End: 2020-07-20 | Stop reason: ALTCHOICE

## 2020-03-17 NOTE — PROGRESS NOTES
Subjective:     Patient ID: Alessia Nelson is a 64 y.o. female.    Chief Complaint: Pain    Consulted by: Lynne Parsons NP     Disclaimer: This note was generated using voice recognition software.  There may be a typographical errors that were missed during proofreading.      HPI:    Alessia Nelson is a 64 y.o. female who presents today with chronic low back pain.  She has a history of 2 lumbar surgeries in the past as well as a cervical surgery.  She reports bilateral low back pain that radiates into her right buttock and the posterior aspect of her right leg to her knee and sometimes to her calf.  She does have numbness in bilateral feet attributed to diabetic peripheral neuropathy. This is separate from her low back symptoms.   This pain is described in detail below.    Of note, she has had 4 falls, one from her BP dropping, one while she was asleep, and two others of unknown causes.    She carries a diagnosis of fibromyalgia, but she states that she does not believe she has this.    Aggravating factors:  Sitting, standing, walking, bending/twisting, lifting, exercise    Mitigating factors:  Rest, lying down, medication    Previously seeing: Dr. Cardozo, Kyler Watson PA-C, Dr. Brent Lewis    Interval History (6/25/2019):  She returns today for follow up.  She reports that she has not gotten any relief from the radiofrequency ablation yet.  Nothing has been helpful for the pain.    Outside records from [facility]:  Office visit from 04/18/2019:  Show ongoing treatment for her low back pain. Makes a note of a recent SI joint injection with no benefit.  Makes note of an EMG which shows neuropathy.  Shows ongoing treatment with Norco 7.5/325 mg twice daily as needed with no issues.  Other office visits from 02/26/2019, 12/20/2018, 10/25/2018 all show stable treatment with Norco 7.5/325 mg twice daily as needed with no issues.  One office visit from 11/2014 show treatment with Norco 10/325 mg  with no issues    Interval History (7/22/2019):  She returns today for follow up.  She reports that the right GT bursa injection provided greater than 50% benefit at 1st, lasting until this week.  Norco has been helpful for the pain, but methocarbamol 2 tablets at nighttime has not been as helpful.  She has not been able to get the foam roller that we discussed at our last visit.  She wonders if a TENS unit can be helpful.  She saw Dr. Yuan, who ordered a bone scan to look at a possible fracture at L5 near the L5/S1 facet joint.  She has not had this done yet.    Of note, she continues to have difficulty sleeping.  She admits to using her tablet in the bed.  She reports that she was sleeping better before the amitriptyline and lorazepam were stopped; however, she does not wish to restart the that she has not had any falls since stopping these.    Interval History (8/20/2019):  She returns today for follow up.  She reports that she is having worsening stomach pain. This is actually her primary pain complaint today.  This is being treated up by Dr. Fraga.  Norco has been helpful for her low back and knee pain. She continues to take gabapentin and methocarbamol as well with benefit.  Despite this, she continues to have severe pain that is limiting her functioning.  She asks about the report from her bone scan.    Of note, her right bursa is hurting today.  She asks for repeat injection. This was very helpful in June    Interval History (9/23/2019):  She returns today for follow up.  She reports that she canceled the spinal cord stimulator trial because she is nervous about injection on her hardware.  She reports that her right leg/hip continue to be the most painful placed today.  Overall, she reports that nothing helps although, the Norco takes the edge off.    Interval History (10/28/2019):  She returns today for follow up.  She reports that the right hip injection did not provide great relief.  She continues  "to have severe right hip and low back pain.  She wonders if this is due to her knee pain causing her to alter her gait.  She would like to proceed with the right knee injection today.  She also wonders if she can get a new set of x-rays because she had a fall.  She wonders again if she "may have broken something."    Interval History (12/2/2019):  She returns today for follow up.  She reports that her right knee continues to be incredibly limiting.  She has found out that when her right knee hurts, this makes her low back.  She would like to address her right knee.  She would like to see a new orthopedic surgeon as soon as possible.  Her current regimen is helpful, but she does not like taking the narcotics.  She wonders if increasing muscle relaxers may help.    Of note, she is also dealing with several medical conditions, including overactive bladder, chronic cystitis, and pancreatic problems.  The addition of this, her hypothyroidism was found to be uncontrolled.  Her Synthroid dose was increased, but she has not yet picked up the medication.  Overall, she just feels very bad today.    Interval History (1/27/2020):  She returns today for follow up.  She reports that she continues to have pain after her right TKA on 12/19/2020.  Currently, the worst of her pain is in her right hip/GTB.  However, her primary complaint today is her bladder/bowel symptoms she reports a long-standing history of constipation following her partial bowel resection.  Recently, she has been dealing more constipation, especially since taking a steroid dose pack recently.  Also, she continues to deal with urinary issues, including urinary retention.  She reports that she feels like her bladder is numb.  She reports she cannot feel that her bladder is getting full until it is very full.  This can lead to some leakage at night.  No james incontinence, no stress incontinence.  No surface numbness or saddle anesthesia.    Interval History " (3/17/2020):  She returns today for follow up.  She reports that she has recently been hospitalized with a C diff infection.  During this hospitalization, she was given a lidocaine patch to wear over her right GT bursa.  This greatly helped with her pain.  She would like a prescription for these today.  Overall, she reports that her pain is worse, which she attributes, in part, to her recent hospitalization.  She reports no new symptoms, only a worsening of her usual symptoms.    Physical Therapy:    · Previous:  She has attended physical therapy with benefit.  She was doing home exercise program  · Currently: 03/17/2020:  She has stopped physical therapy due to her recent illnesses      Non-pharmacologic Treatment:     · Ice/Heat: Heat is helpful (her dog lies on her back)  · TENS: Tried at the chiropractor  · Massage: Yes, in the past, helpful  · Chiropractic care:  Yes, in the past, helpful  · Acupuncture: She is interested in trying this.  · Other: Uses a body pillow for sleep         Pain Medications:         · Currently taking: Tylenol 2609-1475 BID PRN (takes it for before the Norco), Norco 7.5/325 mg BID PRN (she took an increased dose for 2 weeks, now she is back to baseline), methocarbamol 750-1500 mg QHS, gabapentin 400 mg BID, trazodone 100 mg QHS, Wellbutrin, Lexapro     · Has tried in the past:    · Opioids: Oxycodone (doesn't like)  · NSAIDS: Celebrex (caused diarrhea)  · Tylenol: Tylenol 4973-5028   · Muscle relaxants: tizanidine (caused SE of not remembering and seeing things)  · TCAs: Amitriptyline (stopped due to falls)  · SNRIs: Not tried  · Anticonvulsants: On gabapentin  · topical creams: Lotion for nighttime, bath oil  · Other: Lorazepam (stopped this)    Blood thinners: Plavix for     Interventional Therapies:   · L4/5 Epidural steroid injection: For many years.  She is unsure of the last one  · Right SI joint: No benefit  · Right L5-S3 MBB: positive  · 06/10/2019:  Right L5-S3 RFA:  50%  "benefit  · 06/25/2019:  Right greater trochanteric bursa injection:  50% benefit  · Right knee intra-articular injection:  Limited benefit    Relevant Surgeries:   · 2006: Lumbar surgery  · 2007: Lumbar surgery  · 2012: Lumbar surgery, PISF  · 2006: Cervical surgery    Affecting sleep? Yes, she cannot sleep due to "brain not turning off"    Affecting daily activities? Yes, she is unable to mop, cook, or do most activities    Depressive symptoms? Yes, she has been diagnosed with depression          · SI/HI? No    Work status: Criminal Justice, disabled due to pain    Prescription Monitoring Program database:  Reviewed and consistent with medication use as prescribed.    Last 3 PDI Scores 3/17/2020 1/27/2020 12/2/2019   Pain Disability Index (PDI) [phone]       Opioid Risk Score       Value Time User    Opioid Risk Score  4 5/21/2019  3:57 PM Clare Alfonso MA          GENERAL:  Positive for fatigue.  No weight loss, malaise or fevers.  HEENT:   No recent changes in vision or hearing  NECK:  Negative for lumps, no difficulty with swallowing.  RESPIRATORY:  Positive for cough.  Negative for wheezing or shortness of breath, patient denies any recent URI.  CARDIOVASCULAR:  Negative for chest pain, leg swelling or palpitations.  GI:  Positive for diarrhea.  Negative for abdominal discomfort, blood in stools or black stools or change in bowel habits.  MUSCULOSKELETAL:  See HPI.  SKIN:  Positive for color change, texture change.  Negative for lesions, rash, and itching.  PSYCH:  Positive for anxiety.  No other mood disorder or recent psychosocial stressors.    HEMATOLOGY/LYMPHOLOGY:  Positive for easy bruising.  Negative for prolonged bleeding or swollen nodes.    ENDO:  Positive for hypothyroidism and diabetes.    NEURO:   Positive for headaches, head trauma (due to recent fall), loss of balance, difficulty sleeping.  No history of syncope, paralysis, seizures or tremors.  All other reviewed and negative other than " HPI.          Past Medical History:   Diagnosis Date    Acute pancreatitis     CAD (coronary artery disease)     CHF (congestive heart failure)     COPD (chronic obstructive pulmonary disease)     Depression     Diverticulosis     Encounter for blood transfusion     GERD (gastroesophageal reflux disease)     History of blood clots     1986 AFTER BOWEL RESCETION    History of bowel resection     Hypertension     Peritonitis     Seizures     HAD A SEIZURE FROM PHENERGAN     Thyroid disease     TIA (transient ischemic attack)        Past Surgical History:   Procedure Laterality Date    ADRENAL GLAND SURGERY      APPENDECTOMY      BACK SURGERY      CHOLECYSTECTOMY      COLONOSCOPY      CORONARY STENT PLACEMENT      CYSTOURETHROSCOPY N/A 11/13/2019    Procedure: CYSTOURETHROSCOPY;  Surgeon: Shun Nuñez MD;  Location: [facility] OR;  Service: Urology;  Laterality: N/A;    ENDOSCOPIC ULTRASOUND OF UPPER GASTROINTESTINAL TRACT N/A 11/25/2019    Procedure: ULTRASOUND, UPPER GI TRACT, ENDOSCOPIC;  Surgeon: Alhaji Bridges MD;  Location: [facility] ([address] [place] [address]);  Service: Endoscopy;  Laterality: N/A;  5 day hold Plavix, Dr Jovanni Serrano - pg  PM prep    HERNIA REPAIR      HYSTERECTOMY      INCISIONAL HERNIA REPAIR      INJECTION OF ANESTHETIC AGENT AROUND NERVE Right 5/27/2019    Procedure: RIGHT L5-S3 MEDIAL BRANCH BLOCKS;  Surgeon: Sneha Aragon MD;  Location: [facility] OR;  Service: Pain Management;  Laterality: Right;  **DO NOT STOP PLAVIX**    instestine      KNEE ARTHROPLASTY Right 12/18/2019    Procedure: ARTHROPLASTY, KNEE;  Surgeon: Ike Briceño II, MD;  Location: [facility] OR;  Service: Orthopedics;  Laterality: Right;    KNEE SURGERY  1983    PARATHYROID GLAND SURGERY      3 surgeries    RADIOFREQUENCY ABLATION Right 6/10/2019    Procedure: Radiofrequency Ablation - RIGHT L3-5 RADIOFREQUENCY ABLATION WITH HALYARD COOLIEF THERMAL SYSTEM;  Surgeon: Sneha Aragon MD;  Location: [facility] OR;   "Service: Pain Management;  Laterality: Right;  **HOLD PLAVIX x 7 DAYS PRIOR**    UPPER GASTROINTESTINAL ENDOSCOPY         Review of patient's allergies indicates:   Allergen Reactions    Aspirin      "Makes stomach feel like it's on fire"    Lortab [hydrocodone-acetaminophen] Itching    Phenergan [promethazine]      SEIZURES    Promethazine hcl        Current Outpatient Medications   Medication Sig Dispense Refill    acetaminophen (TYLENOL) 325 MG tablet Take 2 tablets (650 mg total) by mouth every 6 (six) hours as needed for Pain (Do not take with any other tylenol containing products).  0    allopurinol (ZYLOPRIM) 300 MG tablet Take 1 tablet (300 mg total) by mouth once daily. 90 tablet 3    baclofen (LIORESAL) 10 MG tablet Take 10 mg by mouth 2 (two) times daily.       benazepril (LOTENSIN) 20 MG tablet Take 1 tablet (20 mg total) by mouth once daily. (Patient taking differently: Take 20 mg by mouth 2 (two) times daily. ) 30 tablet 11    buPROPion (WELLBUTRIN XL) 150 MG TB24 tablet Take 1 tablet (150 mg total) by mouth once daily. (Patient taking differently: Take 150 mg by mouth every evening. ) 90 tablet 3    clopidogreL (PLAVIX) 75 mg tablet TAKE 1 TABLET (75 MG TOTAL) BY MOUTH ONCE DAILY. 90 tablet 0    cyanocobalamin (VITAMIN B-12) 1,000 mcg/mL injection Inject 1,000 mcg as directed every 14 (fourteen) days.       diclofenac sodium (VOLTAREN) 1 % Gel Apply 2 g topically 3 (three) times daily. 100 g 2    escitalopram oxalate (LEXAPRO) 20 MG tablet Take 1 tablet (20 mg total) by mouth every evening. 90 tablet 2    gabapentin (NEURONTIN) 400 MG capsule Take 1 capsule (400 mg total) by mouth 4 (four) times daily. 360 capsule 3    hydroCHLOROthiazide (HYDRODIURIL) 12.5 MG Tab Take 1 tablet (12.5 mg total) by mouth once daily. 30 tablet 11    HYDROcodone-acetaminophen (NORCO) 7.5-325 mg per tablet Take 1 tablet by mouth 2 (two) times daily as needed for Pain. 60 tablet 0    lactobacillus " acidophilus & bulgar (LACTINEX) 100 million cell packet Take 1 packet (1 each total) by mouth 3 (three) times daily. 90 tablet 2    levothyroxine (SYNTHROID) 75 MCG tablet Take 1 tablet (75 mcg total) by mouth once daily. 90 tablet 3    lidocaine (LIDODERM) 5 % Place 2 patches onto the skin once daily. Remove & Discard patch within 12 hours or as directed by  patch 3    magnesium oxide (MAG-OX) 400 mg (241.3 mg magnesium) tablet Take 1 tablet (400 mg total) by mouth 2 (two) times daily.  0    methocarbamoL (ROBAXIN) 750 MG Tab       metoprolol tartrate (LOPRESSOR) 100 MG tablet Take 1 tablet (100 mg total) by mouth 2 (two) times daily. 180 tablet 2    nicotine (NICODERM CQ) 14 mg/24 hr Place 1 patch onto the skin once daily.  0    ondansetron (ZOFRAN) 4 MG tablet Take 2 tablets (8 mg total) by mouth every 8 (eight) hours as needed for Nausea. 100 tablet 0    pantoprazole (PROTONIX) 40 MG tablet Take 1 tablet (40 mg total) by mouth once daily. 90 tablet 3    potassium chloride SA (K-DUR,KLOR-CON) 20 MEQ tablet Take 1 tablet (20 mEq total) by mouth once daily. 90 tablet 3    rosuvastatin (CRESTOR) 20 MG tablet Take 1 tablet (20 mg total) by mouth once daily. 90 tablet 3    traZODone (DESYREL) 100 MG tablet TAKE 1 TABLET (100 MG TOTAL) BY MOUTH EVERY EVENING. 90 tablet 3    vancomycin (VANCOCIN) 125 MG capsule        No current facility-administered medications for this visit.        Family History   Problem Relation Age of Onset    Stroke Maternal Grandmother     Cancer Maternal Grandfather     Stroke Mother     Heart disease Father        Social History     Socioeconomic History    Marital status: Single     Spouse name: Not on file    Number of children: 0    Years of education: Not on file    Highest education level: Not on file   Occupational History    Not on file   Social Needs    Financial resource strain: Not on file    Food insecurity:     Worry: Not on file     Inability: Not on  "file    Transportation needs:     Medical: Not on file     Non-medical: Not on file   Tobacco Use    Smoking status: Current Every Day Smoker     Packs/day: 1.00     Years: 46.00     Pack years: 46.00    Smokeless tobacco: Never Used   Substance and Sexual Activity    Alcohol use: Yes     Comment: RARELY    Drug use: No    Sexual activity: Not Currently   Lifestyle    Physical activity:     Days per week: Not on file     Minutes per session: Not on file    Stress: Not on file   Relationships    Social connections:     Talks on phone: Not on file     Gets together: Not on file     Attends Faith service: Not on file     Active member of club or organization: Not on file     Attends meetings of clubs or organizations: Not on file     Relationship status: Not on file   Other Topics Concern    Not on file   Social History Narrative    Not on file       Objective:     Vitals:    03/17/20 1029   BP: 96/60   Pulse: (!) 52   Resp: 18   Temp: 97.9 °F (36.6 °C)   SpO2: 96%   Weight: 92.5 kg (203 lb 14.8 oz)   Height: 5' 7" (1.702 m)   PainSc:   8   PainLoc: Leg       GEN:  Well developed, well nourished.  No acute distress. No pain behavior.  HEENT:  No trauma.  Mucous membranes moist.  Nares patent bilaterally.  PSYCH: Normal affect. Thought content appropriate.  CHEST:  Breathing symmetric.  No audible wheezing.  ABD: Soft, non-distended.  SKIN:  Warm, pink, dry.  No rash on exposed areas.    EXT:  No cyanosis, clubbing, or edema.  No color change or changes in nail or hair growth.  NEURO/MUSCULOSKELETAL:  Fully alert, oriented, and appropriate. Speech normal kevin. No cranial nerve deficits.   Gait: Antalgic, ambulating with a walker.        Previous physical exam:  Present trendelenburg sign bilaterally.     Increased sensation in a right L5.   Reflexes:  1+ and symmetric throughout.  Downgoing Babinski's bilaterally.  No clonus or spasticity.  Motor Strength: 5/5 motor strength throughout lower " extremities.   Sensory: Decreased sensation in bilateral soles of her feet equally.  Decreased sensation light touch in Bilateral L5    L-Spine:  Decreased ROM with pain on flexion. Minimal pain with axial/facet loading bilaterally.  Positive SLR on the left for pain in an S1  SI Joint/Hip: Positive MIKEY on the right.  Negative MIKEY on the left and FADIR bilaterally.  TTP over lumbar paraspinals, right SI joint   No TTP over right lumbar paraspinals, right SI joint.  SI joint/hip:  Positive MIKEY and FADIR on the right  TTP over right GTB    Right knee  Inspection: No erythema, swelling, or redness  ROM: Decreased  Palpation: TTP over medial > lateral joint space.  Positive crepitus. No patellar tendon tenderness   No pes anserine tenderness. No patellofemoral tendon tenderness.  No instability on exam.      Imaging:      The imaging studies listed below were independently reviewed by me, and I agree with the findings as documented below.     Narrative     EXAMINATION:  XR LUMBAR SPINE 5 VIEW WITH FLEX AND EXT    CLINICAL HISTORY:  axial low back pain;    TECHNIQUE:  Seven views of the lumbar spine were obtained, with AP, lateral, lumbosacral lateral, both oblique, and lateral flexion and extension radiographs submitted.    COMPARISON:  No prior conventional radiographic examinations of the lumbar spine are currently available for comparison purposes.    FINDINGS:  Postoperative changes are identified, including laminectomies at L4 and L5 and a posterior fusion at L4-5, with bilateral pedicle screws seen in both L4 and L5 attached to vertical stabilization bars, and a disc implant/spacer at L4-5 observed.  Minimal anterolisthesis of L4 in relation L5 is identified, alignment at other levels appearing unremarkable.  No significant translational instability at any thoracolumbar level is seen on the dynamic weight- bearing flexion/extension images.  Vertebral body heights are normally maintained, without significant  compression deformity at any level.  No significant disc narrowing.  Multilevel marginal vertebral endplate spurring is seen throughout the thoracolumbar spine.  Vertebral endplates are well defined.  No osseous destructive process.  Prominent aortoiliac calcification is seen, without definable aneurysm, and there are surgical clips in the upper abdomen to both sides of midline.      Impression       1. Postoperative changes of L4 and L5 laminectomy and instrumented spinal fusion at L4-5.  2. Multilevel marginal vertebral endplate spurring.      Electronically signed by: Maurilio Myles MD  Date: 07/16/2019  Time: 14:02       Narrative     EXAMINATION:  MRI LUMBAR SPINE WITHOUT CONTRAST    CLINICAL HISTORY:  lumbar stenosis; Postlaminectomy syndrome, not elsewhere classified    TECHNIQUE:  Multiplanar, multisequence MR images were acquired from the thoracolumbar junction to the sacrum without the administration of contrast.    COMPARISON:  MRI lumbar spine 08/15/2018.    FINDINGS:  Prior posterior fusion and dorsal decompression from L4-L5.  Interbody cage in place.  Normal alignment.  The remaining lumbar vertebral bodies are normal in height and alignment.  No acute fracture.  No marrow edema.    Visualized distal thoracic spinal cord is normal contour and signal intensity.  Conus medullaris terminates at L1-L2.    L1-L2: Mild facet joint hypertrophy.  No central spinal canal or foraminal stenosis.    L2-L3: Broad-based disc bulging with facet joint hypertrophy results in mild bilateral neural foraminal narrowing.  No central spinal canal stenosis.    L3-L4: Broad-based disc bulging with facet joint hypertrophy and ligamentum flavum thickening results in mild central spinal canal stenosis with moderate bilateral neural foraminal narrowing.  Narrowed AP canal diameter measures 11.0 mm.    L4-L5: Prior posterior fusion and dorsal decompression with bilateral laminectomy.  Endplate osteophytosis with facet joint  hypertrophy results in severe narrowing of the right neural foramen and moderate narrowing the left neural foramen.  No central spinal canal stenosis.    L5-S1: Previous dorsal decompression with bilateral laminectomy.  Broad-based disc bulging slightly eccentric to the right with facet joint hypertrophy results in severe narrowing of the right neural foramen and moderate narrowing the left neural foramen.  No central spinal canal stenosis.      Impression       1. Prior posterior fusion and dorsal decompression from L4-L5.  2. Degenerative disc disease at L2-L3 resulting in mild bilateral neural foraminal narrowing.  3. Degenerative disc disease at L3-L4 resulting in mild central spinal canal stenosis with moderate bilateral neural foraminal narrowing.  4. Multilevel degenerative disc disease from L4 through S1 resulting in severe narrowing of the right neural foramen and moderate narrowing the left neural foramen.      Electronically signed by: Jesús Dawson  Date: 05/23/2019  Time: 10:10     CT Abd/Pelvis shows no evidence of significant spinal canal stenosis in the lower thoracic spine      Assessment:     Encounter Diagnoses   Name Primary?    Chronic pain disorder Yes    Greater trochanteric bursitis of right hip     Postlaminectomy syndrome of lumbar region     Myofascial pain     Status post total right knee replacement     Opioid contract exists        Plan:     Alessia was seen today for leg pain.    Diagnoses and all orders for this visit:    Chronic pain disorder  -     lidocaine (LIDODERM) 5 %; Place 2 patches onto the skin once daily. Remove & Discard patch within 12 hours or as directed by MD    Greater trochanteric bursitis of right hip  -     lidocaine (LIDODERM) 5 %; Place 2 patches onto the skin once daily. Remove & Discard patch within 12 hours or as directed by MD    Postlaminectomy syndrome of lumbar region    Myofascial pain  -     lidocaine (LIDODERM) 5 %; Place 2 patches onto the skin  once daily. Remove & Discard patch within 12 hours or as directed by MD    Status post total right knee replacement    Opioid contract exists         She presents today with a longstanding chronic pain history with several recent medical issues that are complicating the picture.  Continue treatment for other medical conditions.    I do not think that increasing the muscle relaxant would be the most appropriate medication in the setting of a torn meniscus.  This may worsen her condition her.    We discussed the assessment and recommendations.  All available images were reviewed. We discussed the disease process, prognosis, treatment plan, and risks and benefits. The patient is aware of the risks and benefits of the medications being prescribed, common side effects, and proper usage. The following is the plan we agreed on:     1. Prescription for Lidoderm patch sent to mail order pharmacy.  If she is unable to get this to the insurance, she can get the over-the-counter ones  2. Consider right GTB injection at follow up.  Unable to do today as she is on antibiotics  3. Her bladder/bowel symptoms are not consistent with lumbar spine pathology.  I recommend following up with Dr. Nuñez with Urology.  1. Miralax for constipation  4. Continue with Orthopedics for ongoing management of her right knee pain.  5. Continue medical treatment for her other medical conditions  6. Previously, we had discussed spinal cord stimulation.  7. Can repeat right L5-S3 RFA PRN.  Can also consider diagnostic right sacroiliac joint injection  8. Continue the Norco 7.5/325 mg twice daily as needed.  Currently, the benefits outweigh the risks.  We had a long discussion regarding the risks and benefits of narcotic therapy.  We will work to minimize these as much as possible.  [facility] reviewed and is appropriate.  Opioid contract in place..  UDS done 01/27/2020:  Consistent  9. We had an extensive discussion regarding the expected time  course for PT for chronic pain vs after a surgery.  Specifically, we discussed that PT for chronic pain almost always causes a worsening of pain before any improvement, which is generally not seen for 3-6 months, in which time she will likely already be home doing home exercises given that a formal course of PT generally lasts anywhere from 6-12 weeks.  10. Recommend she obtain a foam roller for home use for her bursitis  11. Recommend that she obtain a TENS unit for home use  12. She would be a good candidate for FRP in the future  13. RTC in 4 weeks or sooner if needed.       Sneha Aragon MD  03/17/2020     The above plan and management options were discussed at length with patient. Patient is in agreement with the above and verbalized understanding. It will be communicated with the referring physician via electronic record, fax, or mail.

## 2020-03-17 NOTE — LETTER
March 17, 2020      Cheryl Bradley, NP  [address]  [place] [address]           [facility]  [address] [place] [address]  Phone: [phone]  Fax: [phone]          Patient: Alessia Nelson   MR Number: [id number]   YOB: 1955   Date of Visit: 3/17/2020       Dear Cheryl Bradley:    Thank you for referring Alessia Nelson to me for evaluation. Attached you will find relevant portions of my assessment and plan of care.    If you have questions, please do not hesitate to call me. I look forward to following Alessia Nelson along with you.    Sincerely,    Sneha Aragon MD    Enclosure  CC:  No Recipients    If you would like to receive this communication electronically, please contact [email or web address] or [phone] to request more information on [facility] [employer] access.    For providers and/or their staff who would like to refer a patient to [facility], please contact us through our one-stop-shop provider referral line, [facility], at [phone].    If you feel you have received this communication in error or would no longer like to receive these types of communications, please e-mail [email or web address]

## 2020-03-17 NOTE — PATIENT INSTRUCTIONS
I am sending in the lidoderm patch to your pharmacy.  If we are not able to get this, I recommend obtaining an over the counter lidocaine patch, such as the one from [employer], [employer], [employer], or [employer].  I, personally, like the one from [employer] as the adhesive is not irritating.  You may wear this for 12 hours, then remove the patch.  You may wear up to 2 of these a day.

## 2020-03-17 NOTE — PROGRESS NOTES
"Subjective:       Patient ID: Alessia Nelson is a 64 y.o. female.    Chief Complaint: Follow-up (hospital f/u)    Ms. Alessia Nelson is a 64 year old female who presents to the clinic today for hospital follow up. Reports she does not feel good. Reports she continues to take her antibiotics post hospital d/c as ordered. Reports she is drinking and eat. She has had 1 BM today, reports normal. Complains of flank pain, intermittently, reports "I think it could be my kidneys, not sure," reports she had labs yesterday with , [facility]. Reports she is not going back to the hospital because she has to stay with her dog.     Review of Systems   Constitutional: Positive for fatigue. Negative for activity change, appetite change, chills, fever and unexpected weight change.   HENT: Negative for congestion, ear pain, postnasal drip, sore throat and tinnitus.    Eyes: Negative for pain and visual disturbance.   Respiratory: Negative for apnea, cough, chest tightness, shortness of breath and wheezing.    Cardiovascular: Negative for chest pain, palpitations and leg swelling.   Gastrointestinal: Negative for abdominal distention, abdominal pain, blood in stool, constipation, diarrhea, nausea and vomiting.   Endocrine: Negative for polydipsia and polyuria.   Genitourinary: Negative for difficulty urinating, flank pain, frequency and urgency.   Musculoskeletal: Positive for arthralgias and myalgias. Negative for back pain and joint swelling.   Skin: Negative for color change.   Allergic/Immunologic: Negative for environmental allergies and food allergies.   Neurological: Positive for weakness. Negative for dizziness, tremors, syncope, light-headedness and headaches.   Hematological: Does not bruise/bleed easily.   Psychiatric/Behavioral: Negative for agitation, decreased concentration, self-injury, sleep disturbance and suicidal ideas. The patient is not nervous/anxious.          Reviewed family, medical, surgical, and " "social history.    Objective:      /60 (BP Location: Left arm, Patient Position: Sitting, BP Method: Medium (Automatic))   Pulse (!) 57   Temp 97 °F (36.1 °C) (Tympanic)   Resp 17   Ht 5' 7" (1.702 m)   Wt 77.6 kg (171 lb)   SpO2 99%   BMI 26.78 kg/m²   Physical Exam   Constitutional: She is oriented to person, place, and time. She appears well-developed and well-nourished. She is cooperative. No distress.   HENT:   Head: Normocephalic and atraumatic.   Nose: Nose normal.   Mouth/Throat: Uvula is midline, oropharynx is clear and moist and mucous membranes are normal. No uvula swelling.   Eyes: Pupils are equal, round, and reactive to light. Conjunctivae, EOM and lids are normal.   Neck: Trachea normal and full passive range of motion without pain. No tracheal tenderness present. No neck rigidity. No thyroid mass present.   Cardiovascular: Normal rate, regular rhythm, S1 normal, S2 normal, normal heart sounds, intact distal pulses and normal pulses.   Pulmonary/Chest: Effort normal and breath sounds normal. No respiratory distress. She has no decreased breath sounds. She has no wheezes.   Abdominal: Soft. Normal appearance and bowel sounds are normal. She exhibits no distension and no abdominal bruit. There is no guarding and no CVA tenderness.   Musculoskeletal: She exhibits no edema, tenderness or deformity.   Lymphadenopathy:     She has no cervical adenopathy.   Neurological: She is alert and oriented to person, place, and time. She has normal strength. She exhibits normal muscle tone.   Skin: Skin is warm, dry and intact. Capillary refill takes less than 2 seconds. No abrasion, no laceration, no lesion and no rash noted. She is not diaphoretic. No cyanosis. Nails show no clubbing.   Psychiatric: She has a normal mood and affect. Her speech is normal and behavior is normal. Judgment and thought content normal. Cognition and memory are normal.       Assessment:       1. C. difficile colitis    2. TINO " (acute kidney injury)    3. Bilateral flank pain        Plan:       C. difficile colitis  -     CBC auto differential; Future; Expected date: 03/24/2020  -     Magnesium; Future; Expected date: 03/17/2020  -     Comprehensive metabolic panel; Future; Expected date: 03/17/2020    TINO (acute kidney injury)  -     CBC auto differential; Future; Expected date: 03/24/2020  -     Magnesium; Future; Expected date: 03/17/2020  -     Comprehensive metabolic panel; Future; Expected date: 03/17/2020    Bilateral flank pain  -     US Retroperitoneal Complete; Future; Expected date: 03/17/2020          PLAN:  - Discussed with patient the plan of care  - Obtain labs today, if no received from   - hydration, probiotics reviewed and encouraged  - Medications reviewed. Medication side effects discussed. Patient has no questions or concerns at this time. Informed patient to notify me regarding any concerns.   - Informed patient to please notify me with any questions or concerns at anytime  - Follow up ordered for 4 weeks      Risks, benefits, and side effects were discussed with the patient. All questions were answered to the fullest satisfaction of the patient, and pt verbalized understanding and agreement to treatment plan. Pt was to call with any new or worsening symptoms, or present to the ER.

## 2020-03-19 ENCOUNTER — TELEPHONE (OUTPATIENT)
Dept: FAMILY MEDICINE | Facility: CLINIC | Age: 65
End: 2020-03-19

## 2020-03-19 ENCOUNTER — HOSPITAL ENCOUNTER (OUTPATIENT)
Dept: RADIOLOGY | Facility: HOSPITAL | Age: 65
Discharge: HOME | End: 2020-03-19
Attending: NURSE PRACTITIONER
Payer: MEDICARE

## 2020-03-19 DIAGNOSIS — R10.9 BILATERAL FLANK PAIN: ICD-10-CM

## 2020-03-19 DIAGNOSIS — N28.9 RENAL DISEASE: Primary | ICD-10-CM

## 2020-03-19 PROCEDURE — 76770 US EXAM ABDO BACK WALL COMP: CPT | Mod: 26,,, | Performed by: RADIOLOGY

## 2020-03-19 PROCEDURE — 76770 US EXAM ABDO BACK WALL COMP: CPT | Mod: TC,PN

## 2020-03-19 PROCEDURE — 76770 US RETROPERITONEAL COMPLETE: ICD-10-PCS | Mod: 26,,, | Performed by: RADIOLOGY

## 2020-03-19 NOTE — PROGRESS NOTES
Please let Ms Aggarwal know her US showed renal disease. Will refer to nephrologist. It did show a simple cyst, 12 mm  Thank you

## 2020-03-24 ENCOUNTER — TELEPHONE (OUTPATIENT)
Dept: PAIN MEDICINE | Facility: CLINIC | Age: 65
End: 2020-03-24

## 2020-03-24 NOTE — TELEPHONE ENCOUNTER
----- Message from Princess NALINI Matthew sent at 3/24/2020  2:17 PM CDT -----  Contact: pt  Type: Needs Medical Advice    Who Called:  Patient  Best Call Back Number:    Additional Information: Patient's insurance is needing a PA for rx lidocaine (LIDODERM) 5 %, a fax was sent over to the office. Patient is also requesting her HYDROcodone-acetaminophen (NORCO) 7.5-325 mg per tablet to be to 8 hours instead of taking it 12 hours.

## 2020-03-24 NOTE — TELEPHONE ENCOUNTER
Let's complete the PA to see if we can get this.  If not, please remind her that she can get the OTC ones that we discussed in the office.  I don't recommend increasing the Norco at this time.

## 2020-03-24 NOTE — TELEPHONE ENCOUNTER
Informed pt of providers recommendations. Will wait on PA approval. She does not want to go to the store to buy patches.

## 2020-04-08 DIAGNOSIS — M62.838 MUSCLE SPASMS OF BOTH LOWER EXTREMITIES: Primary | ICD-10-CM

## 2020-04-11 RX ORDER — BACLOFEN 10 MG/1
TABLET ORAL
Qty: 60 TABLET | Refills: 2 | Status: SHIPPED | OUTPATIENT
Start: 2020-04-11 | End: 2020-07-20

## 2020-04-13 ENCOUNTER — PATIENT OUTREACH (OUTPATIENT)
Dept: ADMINISTRATIVE | Facility: OTHER | Age: 65
End: 2020-04-13

## 2020-04-13 ENCOUNTER — TELEPHONE (OUTPATIENT)
Dept: PAIN MEDICINE | Facility: CLINIC | Age: 65
End: 2020-04-13

## 2020-04-14 ENCOUNTER — TELEPHONE (OUTPATIENT)
Dept: FAMILY MEDICINE | Facility: CLINIC | Age: 65
End: 2020-04-14

## 2020-04-14 ENCOUNTER — OFFICE VISIT (OUTPATIENT)
Dept: PAIN MEDICINE | Facility: CLINIC | Age: 65
End: 2020-04-14
Payer: MEDICARE

## 2020-04-14 DIAGNOSIS — M70.61 GREATER TROCHANTERIC BURSITIS OF RIGHT HIP: ICD-10-CM

## 2020-04-14 DIAGNOSIS — M53.3 SACROILIAC JOINT PAIN: ICD-10-CM

## 2020-04-14 DIAGNOSIS — G89.4 CHRONIC PAIN DISORDER: ICD-10-CM

## 2020-04-14 DIAGNOSIS — M96.1 POSTLAMINECTOMY SYNDROME OF LUMBAR REGION: ICD-10-CM

## 2020-04-14 DIAGNOSIS — Z96.651 STATUS POST TOTAL RIGHT KNEE REPLACEMENT: ICD-10-CM

## 2020-04-14 PROCEDURE — 99442 PR PHYSICIAN TELEPHONE EVALUATION 11-20 MIN: ICD-10-PCS | Mod: 95,,, | Performed by: ANESTHESIOLOGY

## 2020-04-14 PROCEDURE — 99442 PR PHYSICIAN TELEPHONE EVALUATION 11-20 MIN: CPT | Mod: 95,,, | Performed by: ANESTHESIOLOGY

## 2020-04-14 RX ORDER — HYDROCODONE BITARTRATE AND ACETAMINOPHEN 7.5; 325 MG/1; MG/1
1 TABLET ORAL 2 TIMES DAILY PRN
Qty: 60 TABLET | Refills: 0 | Status: SHIPPED | OUTPATIENT
Start: 2020-04-14 | End: 2020-05-19 | Stop reason: SDUPTHER

## 2020-04-14 NOTE — PROGRESS NOTES
Subjective:     Patient ID: Alessia Nelson is a 64 y.o. female.    Chief Complaint: Pain    Consulted by: Lynne Parsons NP     Disclaimer: This note was generated using voice recognition software.  There may be a typographical errors that were missed during proofreading.    The patient location is: Home   The chief complaint leading to consultation is: right hip pain   Visit type: Virtual visit with audio only   Total time spent with patient: 12 min   Each patient to whom he or she provides medical services by telemedicine is: (1) informed of the relationship between the physician and patient and the respective role of any other health care provider with respect to management of the patient; and (2) notified that he or she may decline to receive medical services by telemedicine and may withdraw from such care at any time.     HPI:    Alessia Nelson is a 64 y.o. female who presents today with chronic low back pain.  She has a history of 2 lumbar surgeries in the past as well as a cervical surgery.  She reports bilateral low back pain that radiates into her right buttock and the posterior aspect of her right leg to her knee and sometimes to her calf.  She does have numbness in bilateral feet attributed to diabetic peripheral neuropathy. This is separate from her low back symptoms.   This pain is described in detail below.    Of note, she has had 4 falls, one from her BP dropping, one while she was asleep, and two others of unknown causes.    She carries a diagnosis of fibromyalgia, but she states that she does not believe she has this.    Aggravating factors:  Sitting, standing, walking, bending/twisting, lifting, exercise    Mitigating factors:  Rest, lying down, medication    Previously seeing: Dr. Cardozo, Kyler Watson PA-C, Dr. Brent Lewis    Interval History (6/25/2019):  She returns today for follow up.  She reports that she has not gotten any relief from the radiofrequency ablation yet.   Nothing has been helpful for the pain.    Outside records from [facility]:  Office visit from 04/18/2019:  Show ongoing treatment for her low back pain. Makes a note of a recent SI joint injection with no benefit.  Makes note of an EMG which shows neuropathy.  Shows ongoing treatment with Norco 7.5/325 mg twice daily as needed with no issues.  Other office visits from 02/26/2019, 12/20/2018, 10/25/2018 all show stable treatment with Norco 7.5/325 mg twice daily as needed with no issues.  One office visit from 11/2014 show treatment with Norco 10/325 mg with no issues    Interval History (7/22/2019):  She returns today for follow up.  She reports that the right GT bursa injection provided greater than 50% benefit at 1st, lasting until this week.  Norco has been helpful for the pain, but methocarbamol 2 tablets at nighttime has not been as helpful.  She has not been able to get the foam roller that we discussed at our last visit.  She wonders if a TENS unit can be helpful.  She saw Dr. Yuan, who ordered a bone scan to look at a possible fracture at L5 near the L5/S1 facet joint.  She has not had this done yet.    Of note, she continues to have difficulty sleeping.  She admits to using her tablet in the bed.  She reports that she was sleeping better before the amitriptyline and lorazepam were stopped; however, she does not wish to restart the that she has not had any falls since stopping these.    Interval History (8/20/2019):  She returns today for follow up.  She reports that she is having worsening stomach pain. This is actually her primary pain complaint today.  This is being treated up by Dr. Fraga.  Norco has been helpful for her low back and knee pain. She continues to take gabapentin and methocarbamol as well with benefit.  Despite this, she continues to have severe pain that is limiting her functioning.  She asks about the report from her bone scan.    Of note, her right bursa is hurting today.   "She asks for repeat injection. This was very helpful in June    Interval History (9/23/2019):  She returns today for follow up.  She reports that she canceled the spinal cord stimulator trial because she is nervous about injection on her hardware.  She reports that her right leg/hip continue to be the most painful placed today.  Overall, she reports that nothing helps although, the Norco takes the edge off.    Interval History (10/28/2019):  She returns today for follow up.  She reports that the right hip injection did not provide great relief.  She continues to have severe right hip and low back pain.  She wonders if this is due to her knee pain causing her to alter her gait.  She would like to proceed with the right knee injection today.  She also wonders if she can get a new set of x-rays because she had a fall.  She wonders again if she "may have broken something."    Interval History (12/2/2019):  She returns today for follow up.  She reports that her right knee continues to be incredibly limiting.  She has found out that when her right knee hurts, this makes her low back.  She would like to address her right knee.  She would like to see a new orthopedic surgeon as soon as possible.  Her current regimen is helpful, but she does not like taking the narcotics.  She wonders if increasing muscle relaxers may help.    Of note, she is also dealing with several medical conditions, including overactive bladder, chronic cystitis, and pancreatic problems.  The addition of this, her hypothyroidism was found to be uncontrolled.  Her Synthroid dose was increased, but she has not yet picked up the medication.  Overall, she just feels very bad today.    Interval History (1/27/2020):  She returns today for follow up.  She reports that she continues to have pain after her right TKA on 12/19/2020.  Currently, the worst of her pain is in her right hip/GTB.  However, her primary complaint today is her bladder/bowel symptoms " she reports a long-standing history of constipation following her partial bowel resection.  Recently, she has been dealing more constipation, especially since taking a steroid dose pack recently.  Also, she continues to deal with urinary issues, including urinary retention.  She reports that she feels like her bladder is numb.  She reports she cannot feel that her bladder is getting full until it is very full.  This can lead to some leakage at night.  No james incontinence, no stress incontinence.  No surface numbness or saddle anesthesia.    Interval History (3/17/2020):  She returns today for follow up.  She reports that she has recently been hospitalized with a C diff infection.  During this hospitalization, she was given a lidocaine patch to wear over her right GT bursa.  This greatly helped with her pain.  She would like a prescription for these today.  Overall, she reports that her pain is worse, which she attributes, in part, to her recent hospitalization.  She reports no new symptoms, only a worsening of her usual symptoms.    Interval History (4/14/2020):  She returns today for follow up via audio visit.  She reports that the lidocaine patches have been very helpful. Her Norco has been helpful for the pain.  She is looking forward to repeating the GTB injection when able.    She needs a Tier exception for the lidocaine patches  .   Physical Therapy:    · Previous:  She has attended physical therapy with benefit.  She was doing home exercise program  · Currently: 04/14/2020:  She has stopped physical therapy due to her recent illnesses      Non-pharmacologic Treatment:     · Ice/Heat: Heat is helpful (her dog lies on her back)  · TENS: Tried at the chiropractor  · Massage: Yes, in the past, helpful  · Chiropractic care:  Yes, in the past, helpful  · Acupuncture: She is interested in trying this.  · Other: Uses a body pillow for sleep         Pain Medications:         · Currently taking: Tylenol 5992-7335  "BID PRN (takes it for before the Norco), Norco 7.5/325 mg BID PRN, Lidocaine patches, 2 daily, very helpful, methocarbamol 750-1500 mg QHS, gabapentin 400 mg BID, trazodone 100 mg QHS, Wellbutrin, Lexapro     · Has tried in the past:    · Opioids: Oxycodone (doesn't like)  · NSAIDS: Celebrex (caused diarrhea)  · Tylenol: Tylenol 3813-1239   · Muscle relaxants: tizanidine (caused SE of not remembering and seeing things)  · TCAs: Amitriptyline (stopped due to falls)  · SNRIs: Not tried  · Anticonvulsants: On gabapentin  · topical creams: Lotion for nighttime, bath oil  · Other: Lorazepam (stopped this)    Blood thinners: Plavix for     Interventional Therapies:   · L4/5 Epidural steroid injection: For many years.  She is unsure of the last one  · Right SI joint: No benefit  · Right L5-S3 MBB: positive  · 06/10/2019:  Right L5-S3 RFA:  50% benefit  · 06/25/2019:  Right greater trochanteric bursa injection:  50% benefit  · Right knee intra-articular injection:  Limited benefit    Relevant Surgeries:   · 2006: Lumbar surgery  · 2007: Lumbar surgery  · 2012: Lumbar surgery, PISF  · 2006: Cervical surgery    Affecting sleep? Yes, she cannot sleep due to "brain not turning off"    Affecting daily activities? Yes, she is unable to mop, cook, or do most activities    Depressive symptoms? Yes, she has been diagnosed with depression          · SI/HI? No    Work status: Criminal Justice, disabled due to pain    Prescription Monitoring Program database:  Reviewed and consistent with medication use as prescribed.    Last 3 PDI Scores 3/17/2020 1/27/2020 12/2/2019   Pain Disability Index (PDI) [phone]       Opioid Risk Score       Value Time User    Opioid Risk Score  4 5/21/2019  3:57 PM Clare Alfonso MA          GENERAL:  Positive for fatigue.  No weight loss, malaise or fevers.  HEENT:   No recent changes in vision or hearing  NECK:  Negative for lumps, no difficulty with swallowing.  RESPIRATORY:  Positive for cough.  " Negative for wheezing or shortness of breath, patient denies any recent URI.  CARDIOVASCULAR:  Negative for chest pain, leg swelling or palpitations.  GI:  Positive for diarrhea.  Negative for abdominal discomfort, blood in stools or black stools or change in bowel habits.  MUSCULOSKELETAL:  See HPI.  SKIN:  Positive for color change, texture change.  Negative for lesions, rash, and itching.  PSYCH:  Positive for anxiety.  No other mood disorder or recent psychosocial stressors.    HEMATOLOGY/LYMPHOLOGY:  Positive for easy bruising.  Negative for prolonged bleeding or swollen nodes.    ENDO:  Positive for hypothyroidism and diabetes.    NEURO:   Positive for headaches, head trauma (due to recent fall), loss of balance, difficulty sleeping.  No history of syncope, paralysis, seizures or tremors.  All other reviewed and negative other than HPI.          Past Medical History:   Diagnosis Date    Acute pancreatitis     CAD (coronary artery disease)     CHF (congestive heart failure)     COPD (chronic obstructive pulmonary disease)     Depression     Diverticulosis     Encounter for blood transfusion     GERD (gastroesophageal reflux disease)     History of blood clots     1986 AFTER BOWEL RESCETION    History of bowel resection     Hypertension     Peritonitis     Seizures     HAD A SEIZURE FROM PHENERGAN     Thyroid disease     TIA (transient ischemic attack)        Past Surgical History:   Procedure Laterality Date    ADRENAL GLAND SURGERY      APPENDECTOMY      BACK SURGERY      CHOLECYSTECTOMY      COLONOSCOPY      CORONARY STENT PLACEMENT      CYSTOURETHROSCOPY N/A 11/13/2019    Procedure: CYSTOURETHROSCOPY;  Surgeon: Shun Nuñze MD;  Location: [facility] OR;  Service: Urology;  Laterality: N/A;    ENDOSCOPIC ULTRASOUND OF UPPER GASTROINTESTINAL TRACT N/A 11/25/2019    Procedure: ULTRASOUND, UPPER GI TRACT, ENDOSCOPIC;  Surgeon: Alhaji Bridges MD;  Location: [facility] ENDO (2ND FLR);   "Service: Endoscopy;  Laterality: N/A;  5 day hold Plavix, Dr Jovanni Serrano - pg  PM prep    HERNIA REPAIR      HYSTERECTOMY      INCISIONAL HERNIA REPAIR      INJECTION OF ANESTHETIC AGENT AROUND NERVE Right 5/27/2019    Procedure: RIGHT L5-S3 MEDIAL BRANCH BLOCKS;  Surgeon: Sneha Aragon MD;  Location: [facility] OR;  Service: Pain Management;  Laterality: Right;  **DO NOT STOP PLAVIX**    instestine      KNEE ARTHROPLASTY Right 12/18/2019    Procedure: ARTHROPLASTY, KNEE;  Surgeon: Ike Briceño II, MD;  Location: [facility] OR;  Service: Orthopedics;  Laterality: Right;    KNEE SURGERY  1983    PARATHYROID GLAND SURGERY      3 surgeries    RADIOFREQUENCY ABLATION Right 6/10/2019    Procedure: Radiofrequency Ablation - RIGHT L3-5 RADIOFREQUENCY ABLATION WITH HALYARD COOLIEF THERMAL SYSTEM;  Surgeon: Sneha Aragon MD;  Location: [facility] OR;  Service: Pain Management;  Laterality: Right;  **HOLD PLAVIX x 7 DAYS PRIOR**    UPPER GASTROINTESTINAL ENDOSCOPY         Review of patient's allergies indicates:   Allergen Reactions    Aspirin      "Makes stomach feel like it's on fire"    Lortab [hydrocodone-acetaminophen] Itching    Phenergan [promethazine]      SEIZURES    Promethazine hcl        Current Outpatient Medications   Medication Sig Dispense Refill    acetaminophen (TYLENOL) 325 MG tablet Take 2 tablets (650 mg total) by mouth every 6 (six) hours as needed for Pain (Do not take with any other tylenol containing products).  0    allopurinol (ZYLOPRIM) 300 MG tablet Take 1 tablet (300 mg total) by mouth once daily. 90 tablet 3    baclofen (LIORESAL) 10 MG tablet TAKE 1 TABLET(10 MG) BY MOUTH TWICE DAILY 60 tablet 2    benazepril (LOTENSIN) 20 MG tablet Take 1 tablet (20 mg total) by mouth once daily. (Patient taking differently: Take 20 mg by mouth 2 (two) times daily. ) 30 tablet 11    buPROPion (WELLBUTRIN XL) 150 MG TB24 tablet Take 1 tablet (150 mg total) by mouth once daily. (Patient taking " differently: Take 150 mg by mouth every evening. ) 90 tablet 3    clopidogreL (PLAVIX) 75 mg tablet TAKE 1 TABLET (75 MG TOTAL) BY MOUTH ONCE DAILY. 90 tablet 0    cyanocobalamin (VITAMIN B-12) 1,000 mcg/mL injection Inject 1,000 mcg as directed every 14 (fourteen) days.       diclofenac sodium (VOLTAREN) 1 % Gel Apply 2 g topically 3 (three) times daily. 100 g 2    escitalopram oxalate (LEXAPRO) 20 MG tablet Take 1 tablet (20 mg total) by mouth every evening. 90 tablet 2    gabapentin (NEURONTIN) 400 MG capsule Take 1 capsule (400 mg total) by mouth 4 (four) times daily. 360 capsule 3    hydroCHLOROthiazide (HYDRODIURIL) 12.5 MG Tab Take 1 tablet (12.5 mg total) by mouth once daily. 30 tablet 11    HYDROcodone-acetaminophen (NORCO) 7.5-325 mg per tablet Take 1 tablet by mouth 2 (two) times daily as needed for Pain. 60 tablet 0    lactobacillus acidophilus & bulgar (LACTINEX) 100 million cell packet Take 1 packet (1 each total) by mouth 3 (three) times daily. 90 tablet 2    levothyroxine (SYNTHROID) 75 MCG tablet Take 1 tablet (75 mcg total) by mouth once daily. 90 tablet 3    lidocaine (LIDODERM) 5 % Place 2 patches onto the skin once daily. Remove & Discard patch within 12 hours or as directed by  patch 3    magnesium oxide (MAG-OX) 400 mg (241.3 mg magnesium) tablet Take 1 tablet (400 mg total) by mouth 2 (two) times daily.  0    methocarbamoL (ROBAXIN) 750 MG Tab       metoprolol tartrate (LOPRESSOR) 100 MG tablet Take 1 tablet (100 mg total) by mouth 2 (two) times daily. 180 tablet 2    nicotine (NICODERM CQ) 14 mg/24 hr Place 1 patch onto the skin once daily.  0    ondansetron (ZOFRAN) 4 MG tablet Take 2 tablets (8 mg total) by mouth every 8 (eight) hours as needed for Nausea. 100 tablet 0    pantoprazole (PROTONIX) 40 MG tablet Take 1 tablet (40 mg total) by mouth once daily. 90 tablet 3    potassium chloride SA (K-DUR,KLOR-CON) 20 MEQ tablet Take 1 tablet (20 mEq total) by mouth once  daily. 90 tablet 3    rosuvastatin (CRESTOR) 20 MG tablet Take 1 tablet (20 mg total) by mouth once daily. 90 tablet 3    traZODone (DESYREL) 100 MG tablet TAKE 1 TABLET (100 MG TOTAL) BY MOUTH EVERY EVENING. 90 tablet 3    vancomycin (VANCOCIN) 125 MG capsule        No current facility-administered medications for this visit.        Family History   Problem Relation Age of Onset    Stroke Maternal Grandmother     Cancer Maternal Grandfather     Stroke Mother     Heart disease Father        Social History     Socioeconomic History    Marital status: Single     Spouse name: Not on file    Number of children: 0    Years of education: Not on file    Highest education level: Not on file   Occupational History    Not on file   Social Needs    Financial resource strain: Not on file    Food insecurity:     Worry: Not on file     Inability: Not on file    Transportation needs:     Medical: Not on file     Non-medical: Not on file   Tobacco Use    Smoking status: Current Every Day Smoker     Packs/day: 1.00     Years: 46.00     Pack years: 46.00    Smokeless tobacco: Never Used   Substance and Sexual Activity    Alcohol use: Yes     Comment: RARELY    Drug use: No    Sexual activity: Not Currently   Lifestyle    Physical activity:     Days per week: Not on file     Minutes per session: Not on file    Stress: Not on file   Relationships    Social connections:     Talks on phone: Not on file     Gets together: Not on file     Attends Confucianist service: Not on file     Active member of club or organization: Not on file     Attends meetings of clubs or organizations: Not on file     Relationship status: Not on file   Other Topics Concern    Not on file   Social History Narrative    Not on file       Objective:     There were no vitals filed for this visit.    No physical exam performed due to audio visit only.    Previous physical exam:    GEN:  Well developed, well nourished.  No acute distress. No pain  behavior.  HEENT:  No trauma.  Mucous membranes moist.  Nares patent bilaterally.  PSYCH: Normal affect. Thought content appropriate.  CHEST:  Breathing symmetric.  No audible wheezing.  ABD: Soft, non-distended.  SKIN:  Warm, pink, dry.  No rash on exposed areas.    EXT:  No cyanosis, clubbing, or edema.  No color change or changes in nail or hair growth.  NEURO/MUSCULOSKELETAL:  Fully alert, oriented, and appropriate. Speech normal kevin. No cranial nerve deficits.   Gait: Antalgic, ambulating with a walker.        Previous physical exam:  Present trendelenburg sign bilaterally.     Increased sensation in a right L5.   Reflexes:  1+ and symmetric throughout.  Downgoing Babinski's bilaterally.  No clonus or spasticity.  Motor Strength: 5/5 motor strength throughout lower extremities.   Sensory: Decreased sensation in bilateral soles of her feet equally.  Decreased sensation light touch in Bilateral L5    L-Spine:  Decreased ROM with pain on flexion. Minimal pain with axial/facet loading bilaterally.  Positive SLR on the left for pain in an S1  SI Joint/Hip: Positive MIKEY on the right.  Negative MIKEY on the left and FADIR bilaterally.  TTP over lumbar paraspinals, right SI joint   No TTP over right lumbar paraspinals, right SI joint.  SI joint/hip:  Positive MIKEY and FADIR on the right  TTP over right GTB    Right knee  Inspection: No erythema, swelling, or redness  ROM: Decreased  Palpation: TTP over medial > lateral joint space.  Positive crepitus. No patellar tendon tenderness   No pes anserine tenderness. No patellofemoral tendon tenderness.  No instability on exam.      Imaging:      The imaging studies listed below were independently reviewed by me, and I agree with the findings as documented below.     Narrative     EXAMINATION:  XR LUMBAR SPINE 5 VIEW WITH FLEX AND EXT    CLINICAL HISTORY:  axial low back pain;    TECHNIQUE:  Seven views of the lumbar spine were obtained, with AP, lateral, lumbosacral  lateral, both oblique, and lateral flexion and extension radiographs submitted.    COMPARISON:  No prior conventional radiographic examinations of the lumbar spine are currently available for comparison purposes.    FINDINGS:  Postoperative changes are identified, including laminectomies at L4 and L5 and a posterior fusion at L4-5, with bilateral pedicle screws seen in both L4 and L5 attached to vertical stabilization bars, and a disc implant/spacer at L4-5 observed.  Minimal anterolisthesis of L4 in relation L5 is identified, alignment at other levels appearing unremarkable.  No significant translational instability at any thoracolumbar level is seen on the dynamic weight- bearing flexion/extension images.  Vertebral body heights are normally maintained, without significant compression deformity at any level.  No significant disc narrowing.  Multilevel marginal vertebral endplate spurring is seen throughout the thoracolumbar spine.  Vertebral endplates are well defined.  No osseous destructive process.  Prominent aortoiliac calcification is seen, without definable aneurysm, and there are surgical clips in the upper abdomen to both sides of midline.      Impression       1. Postoperative changes of L4 and L5 laminectomy and instrumented spinal fusion at L4-5.  2. Multilevel marginal vertebral endplate spurring.      Electronically signed by: Maurilio Myles MD  Date: 07/16/2019  Time: 14:02       Narrative     EXAMINATION:  MRI LUMBAR SPINE WITHOUT CONTRAST    CLINICAL HISTORY:  lumbar stenosis; Postlaminectomy syndrome, not elsewhere classified    TECHNIQUE:  Multiplanar, multisequence MR images were acquired from the thoracolumbar junction to the sacrum without the administration of contrast.    COMPARISON:  MRI lumbar spine 08/15/2018.    FINDINGS:  Prior posterior fusion and dorsal decompression from L4-L5.  Interbody cage in place.  Normal alignment.  The remaining lumbar vertebral bodies are normal in height and  alignment.  No acute fracture.  No marrow edema.    Visualized distal thoracic spinal cord is normal contour and signal intensity.  Conus medullaris terminates at L1-L2.    L1-L2: Mild facet joint hypertrophy.  No central spinal canal or foraminal stenosis.    L2-L3: Broad-based disc bulging with facet joint hypertrophy results in mild bilateral neural foraminal narrowing.  No central spinal canal stenosis.    L3-L4: Broad-based disc bulging with facet joint hypertrophy and ligamentum flavum thickening results in mild central spinal canal stenosis with moderate bilateral neural foraminal narrowing.  Narrowed AP canal diameter measures 11.0 mm.    L4-L5: Prior posterior fusion and dorsal decompression with bilateral laminectomy.  Endplate osteophytosis with facet joint hypertrophy results in severe narrowing of the right neural foramen and moderate narrowing the left neural foramen.  No central spinal canal stenosis.    L5-S1: Previous dorsal decompression with bilateral laminectomy.  Broad-based disc bulging slightly eccentric to the right with facet joint hypertrophy results in severe narrowing of the right neural foramen and moderate narrowing the left neural foramen.  No central spinal canal stenosis.      Impression       1. Prior posterior fusion and dorsal decompression from L4-L5.  2. Degenerative disc disease at L2-L3 resulting in mild bilateral neural foraminal narrowing.  3. Degenerative disc disease at L3-L4 resulting in mild central spinal canal stenosis with moderate bilateral neural foraminal narrowing.  4. Multilevel degenerative disc disease from L4 through S1 resulting in severe narrowing of the right neural foramen and moderate narrowing the left neural foramen.      Electronically signed by: Jesús Dawson  Date: 05/23/2019  Time: 10:10     CT Abd/Pelvis shows no evidence of significant spinal canal stenosis in the lower thoracic spine      Assessment:     Encounter Diagnoses   Name Primary?     Chronic pain disorder     Postlaminectomy syndrome of lumbar region     Greater trochanteric bursitis of right hip     Sacroiliac joint pain     Status post total right knee replacement        Plan:     Diagnoses and all orders for this visit:    Chronic pain disorder  -     HYDROcodone-acetaminophen (NORCO) 7.5-325 mg per tablet; Take 1 tablet by mouth 2 (two) times daily as needed for Pain.    Postlaminectomy syndrome of lumbar region  -     HYDROcodone-acetaminophen (NORCO) 7.5-325 mg per tablet; Take 1 tablet by mouth 2 (two) times daily as needed for Pain.    Greater trochanteric bursitis of right hip  -     HYDROcodone-acetaminophen (NORCO) 7.5-325 mg per tablet; Take 1 tablet by mouth 2 (two) times daily as needed for Pain.    Sacroiliac joint pain  -     HYDROcodone-acetaminophen (NORCO) 7.5-325 mg per tablet; Take 1 tablet by mouth 2 (two) times daily as needed for Pain.    Status post total right knee replacement  -     HYDROcodone-acetaminophen (NORCO) 7.5-325 mg per tablet; Take 1 tablet by mouth 2 (two) times daily as needed for Pain.         She presents today with a longstanding chronic pain history with several recent medical issues that are complicating the picture.  Continue treatment for other medical conditions.    I do not think that increasing the muscle relaxant would be the most appropriate medication in the setting of a torn meniscus.  This may worsen her condition her.    We discussed the assessment and recommendations.  All available images were reviewed. We discussed the disease process, prognosis, treatment plan, and risks and benefits. The patient is aware of the risks and benefits of the medications being prescribed, common side effects, and proper usage. The following is the plan we agreed on:     1. Continue Lidoderm patch sent to mail order pharmacy.   2. Consider right GTB injection at follow up.   3. Her bladder/bowel symptoms are not consistent with lumbar spine pathology.  I  recommend following up with Dr. Nuñez with Urology.  1. Miralax for constipation  4. Continue with Orthopedics for ongoing management of her right knee pain.  5. Continue medical treatment for her other medical conditions  6. Previously, we had discussed spinal cord stimulation.  7. Can repeat right L5-S3 RFA PRN.  Can also consider diagnostic right sacroiliac joint injection  8. Continue the Norco 7.5/325 mg twice daily as needed.  Currently, the benefits outweigh the risks.  We had a long discussion regarding the risks and benefits of narcotic therapy.  We will work to minimize these as much as possible.  [facility] reviewed and is appropriate.  Opioid contract in place..  UDS done 01/27/2020:  Consistent  9. We had an extensive discussion regarding the expected time course for PT for chronic pain vs after a surgery.  Specifically, we discussed that PT for chronic pain almost always causes a worsening of pain before any improvement, which is generally not seen for 3-6 months, in which time she will likely already be home doing home exercises given that a formal course of PT generally lasts anywhere from 6-12 weeks.  10. Recommend she obtain a foam roller for home use for her bursitis  11. Recommend that she obtain a TENS unit for home use  12. She would be a good candidate for FRP in the future  13. RTC in 3 weeks or sooner if needed.       Sneha Aragon MD  04/14/2020     The above plan and management options were discussed at length with patient. Patient is in agreement with the above and verbalized understanding. It will be communicated with the referring physician via electronic record, fax, or mail.

## 2020-04-14 NOTE — LETTER
April 14, 2020      Cheryl Bradley, NP  [address]  [place] [address]           [facility]  [address] [facility] [place] [address]  Phone: [phone]  Fax: [phone]          Patient: Alessia Nelson   MR Number: [id number]   YOB: 1955   Date of Visit: 4/14/2020       Dear Cheryl Bradley:    Thank you for referring Alessia Nelson to me for evaluation. Attached you will find relevant portions of my assessment and plan of care.    If you have questions, please do not hesitate to call me. I look forward to following Alessia Nelson along with you.    Sincerely,    Sneha Aragon MD    Enclosure  CC:  No Recipients    If you would like to receive this communication electronically, please contact [email or web address] or [phone] to request more information on [employer] Link access.    For providers and/or their staff who would like to refer a patient to [facility], please contact us through our one-stop-shop provider referral line, [facility], at [phone].    If you feel you have received this communication in error or would no longer like to receive these types of communications, please e-mail [email or web address]

## 2020-04-14 NOTE — TELEPHONE ENCOUNTER
----- Message from Niya Radford sent at 4/14/2020 12:23 PM CDT -----  Harry w/[place] [facility] is calling to let you know that patient has self discontinued her morning dose of metoprolol.  Her pulse is in the 50s and blood pressure is 110/62. Please call her at [phone].  Thank you!

## 2020-04-14 NOTE — TELEPHONE ENCOUNTER
"Spoke with  nurse. Pt has not taken her AM dose of Metoprolol in "a while". VS stable. Just FYI.  "

## 2020-04-29 ENCOUNTER — DOCUMENT SCAN (OUTPATIENT)
Dept: HOME HEALTH SERVICES | Facility: HOSPITAL | Age: 65
End: 2020-04-29
Payer: MEDICARE

## 2020-04-30 ENCOUNTER — PATIENT OUTREACH (OUTPATIENT)
Dept: ADMINISTRATIVE | Facility: OTHER | Age: 65
End: 2020-04-30

## 2020-04-30 DIAGNOSIS — Z12.31 BREAST CANCER SCREENING BY MAMMOGRAM: Primary | ICD-10-CM

## 2020-05-04 RX ORDER — CYANOCOBALAMIN 1000 UG/ML
INJECTION, SOLUTION INTRAMUSCULAR; SUBCUTANEOUS
Qty: 6 ML | Refills: 0 | Status: SHIPPED | OUTPATIENT
Start: 2020-05-04 | End: 2020-08-06

## 2020-05-08 ENCOUNTER — TELEPHONE (OUTPATIENT)
Dept: FAMILY MEDICINE | Facility: CLINIC | Age: 65
End: 2020-05-08

## 2020-05-08 DIAGNOSIS — R19.7 DIARRHEA, UNSPECIFIED TYPE: Primary | ICD-10-CM

## 2020-05-08 NOTE — TELEPHONE ENCOUNTER
----- Message from Angelina Merino sent at 5/8/2020  9:14 AM CDT -----  Contact: Sally with [facility]  Type: Needs Medical Advice  Who Called:  Sally with [facility]  Symptoms (please be specific):    How long has patient had these symptoms:    Pharmacy name and phone #:    Best Call Back Number: [phone]  Additional Information: Patient believes she has C Dif again. She is having very loose and frequent bowel movements. Please call Sally to advise. Thanks!

## 2020-05-08 NOTE — TELEPHONE ENCOUNTER
[facility] notified, given verbal order to obtain stool. The only additional symptom pt is complaining of is abdominal cramps.

## 2020-05-09 RX ORDER — METRONIDAZOLE 500 MG/1
500 TABLET ORAL EVERY 8 HOURS
Qty: 30 TABLET | Refills: 0 | Status: SHIPPED | OUTPATIENT
Start: 2020-05-09 | End: 2020-05-19

## 2020-05-09 NOTE — PROGRESS NOTES
Home health rep antonia called this morning stating patient tested positive for C Diff. Requesting assistance. Med list shows [facility]

## 2020-05-12 PROCEDURE — G0179 MD RECERTIFICATION HHA PT: HCPCS | Mod: ,,, | Performed by: FAMILY MEDICINE

## 2020-05-12 PROCEDURE — G0179 PR HOME HEALTH MD RECERTIFICATION: ICD-10-PCS | Mod: ,,, | Performed by: FAMILY MEDICINE

## 2020-05-12 NOTE — TELEPHONE ENCOUNTER
----- Message from Georgi Sotelo sent at 5/12/2020  2:52 PM CDT -----  Type:  RX Refill Request    Who Called:  Self   Refill or New Rx:  New rx for the local pharmacy/   RX Name and Strength:  levothyroxine (SYNTHROID) 75 MCG tablet  How is the patient currently taking it? (ex. 1XDay):    Is this a 30 day or 90 day RX:  2 week supply   Preferred Pharmacy with phone number:  [facility] [phone]   Local or Mail Order:  local  Ordering Provider:  Mirna Silva Call Back Number:   Additional Information:  Patient requesting a 2 week supply for rx for the local pharmacy. Rx levothyroxine (SYNTHROID) 75 MCG tablet for [employer] for 90 day supply. Patient was told the rx was canceled

## 2020-05-14 RX ORDER — LEVOTHYROXINE SODIUM 75 UG/1
75 TABLET ORAL DAILY
Qty: 90 TABLET | Refills: 3 | Status: SHIPPED | OUTPATIENT
Start: 2020-05-14 | End: 2021-03-23

## 2020-05-18 ENCOUNTER — PATIENT OUTREACH (OUTPATIENT)
Dept: ADMINISTRATIVE | Facility: OTHER | Age: 65
End: 2020-05-18

## 2020-05-18 ENCOUNTER — TELEPHONE (OUTPATIENT)
Dept: PAIN MEDICINE | Facility: CLINIC | Age: 65
End: 2020-05-18

## 2020-05-18 NOTE — TELEPHONE ENCOUNTER
----- Message from Moshe ROBIN Frisard sent at 5/18/2020  8:18 AM CDT -----  Contact: same  FYI--Patient called in and wanted office to know that she scheduled a Virtual appointment for tomorrow Tuesday 5/19/2020 at 8:45am.  Patient stated she had received a message to call & do so.    Patient call back number, if needed is [phone]

## 2020-05-18 NOTE — TELEPHONE ENCOUNTER
Informed pt that this appointment was scheduled during her last visit with us. It is for her to follow up with Dr. Aragon. Pt verbalized understanding.

## 2020-05-19 ENCOUNTER — TELEPHONE (OUTPATIENT)
Dept: PAIN MEDICINE | Facility: CLINIC | Age: 65
End: 2020-05-19

## 2020-05-19 DIAGNOSIS — M96.1 POSTLAMINECTOMY SYNDROME OF LUMBAR REGION: ICD-10-CM

## 2020-05-19 DIAGNOSIS — M70.61 GREATER TROCHANTERIC BURSITIS OF RIGHT HIP: ICD-10-CM

## 2020-05-19 DIAGNOSIS — M53.3 SACROILIAC JOINT PAIN: ICD-10-CM

## 2020-05-19 DIAGNOSIS — G89.4 CHRONIC PAIN DISORDER: ICD-10-CM

## 2020-05-19 DIAGNOSIS — Z96.651 STATUS POST TOTAL RIGHT KNEE REPLACEMENT: ICD-10-CM

## 2020-05-19 RX ORDER — HYDROCODONE BITARTRATE AND ACETAMINOPHEN 7.5; 325 MG/1; MG/1
1 TABLET ORAL 2 TIMES DAILY PRN
Qty: 60 TABLET | Refills: 0 | Status: SHIPPED | OUTPATIENT
Start: 2020-05-19 | End: 2020-06-16 | Stop reason: SDUPTHER

## 2020-05-19 NOTE — TELEPHONE ENCOUNTER
Informed pt that provider sent in medication refill due to connection issues with MyChart. Scheduled 2 mo follow up for her with Lynne Parsons NP on 7/20. Pt verbalized understanding.

## 2020-05-19 NOTE — TELEPHONE ENCOUNTER
----- Message from Lindsay Payton sent at 5/19/2020  8:58 AM CDT -----  Contact: Patient  Type: Needs Medical Advice  Who Called:  Patient  Best Call Back Number:   Additional Information: Patient is scheduled for virtual visit but is having trouble navigating the eliane. Wishes to speak with a nurse.

## 2020-05-20 ENCOUNTER — PATIENT MESSAGE (OUTPATIENT)
Dept: ADMINISTRATIVE | Facility: HOSPITAL | Age: 65
End: 2020-05-20

## 2020-05-28 ENCOUNTER — TELEPHONE (OUTPATIENT)
Dept: FAMILY MEDICINE | Facility: CLINIC | Age: 65
End: 2020-05-28

## 2020-05-28 DIAGNOSIS — R30.0 DYSURIA: Primary | ICD-10-CM

## 2020-05-28 RX ORDER — CLOPIDOGREL BISULFATE 75 MG/1
TABLET ORAL
Qty: 90 TABLET | Refills: 0 | Status: SHIPPED | OUTPATIENT
Start: 2020-05-28 | End: 2020-08-28

## 2020-05-28 NOTE — TELEPHONE ENCOUNTER
----- Message from Nubia Novoa MA sent at 5/28/2020  3:59 PM CDT -----  Contact: patient  Type: Needs Medical Advice  Who Called:  Alessia  Symptoms (please be specific):  Infrequent urination, burning, flank pain, dark urine, urgency  How long has patient had these symptoms:  Off and on for approximately two months  Pharmacy name and phone #:    [facility] #[id number] [place] [address] AT [facility] [address] [address]  Phone: [phone] Fax: [phone]    Best Call Back Number:   Additional Information:

## 2020-05-29 ENCOUNTER — TELEPHONE (OUTPATIENT)
Dept: FAMILY MEDICINE | Facility: CLINIC | Age: 65
End: 2020-05-29

## 2020-05-29 NOTE — TELEPHONE ENCOUNTER
----- Message from Maribell Cartagena sent at 5/29/2020  9:49 AM CDT -----  Contact: pt [phone]  Call placed to pod.  Patient called back she missed a call from the office   Call back [phone]

## 2020-05-31 ENCOUNTER — PATIENT OUTREACH (OUTPATIENT)
Dept: ADMINISTRATIVE | Facility: OTHER | Age: 65
End: 2020-05-31

## 2020-06-01 RX ORDER — BUPROPION HYDROCHLORIDE 150 MG/1
TABLET ORAL
Qty: 90 TABLET | Refills: 3 | Status: SHIPPED | OUTPATIENT
Start: 2020-06-01 | End: 2021-04-16

## 2020-06-04 ENCOUNTER — TELEPHONE (OUTPATIENT)
Dept: FAMILY MEDICINE | Facility: CLINIC | Age: 65
End: 2020-06-04

## 2020-06-04 DIAGNOSIS — R30.0 DYSURIA: Primary | ICD-10-CM

## 2020-06-08 ENCOUNTER — DOCUMENT SCAN (OUTPATIENT)
Dept: HOME HEALTH SERVICES | Facility: HOSPITAL | Age: 65
End: 2020-06-08
Payer: MEDICARE

## 2020-06-10 ENCOUNTER — EXTERNAL HOME HEALTH (OUTPATIENT)
Dept: HOME HEALTH SERVICES | Facility: HOSPITAL | Age: 65
End: 2020-06-10
Payer: MEDICARE

## 2020-06-10 NOTE — PROGRESS NOTES
60 Day Recert Order # [id number]  New Cert Period: 05/12 to 07/10/2020 with [facility] - Dr. Charles Fraga

## 2020-06-11 ENCOUNTER — NURSE TRIAGE (OUTPATIENT)
Dept: ADMINISTRATIVE | Facility: CLINIC | Age: 65
End: 2020-06-11

## 2020-06-11 NOTE — TELEPHONE ENCOUNTER
Pt has multiple complaints and is fed up with [facility], she states she believes she is having C-Diff again and is not going to go back to the ER, she also states her BP has been low, I had her check it while on the phone and it was 121/67, she states she is worried about dehydration, advised her to increase fluid intake, which she states she has 3 bottles lined up to drink, again reiterated the need to go to ER for any worsening symptoms, she wanted an appt for tomorrow, I scheduled one with Cheryl Bradley at 2 pm tomorrow, advised her to call back with any needs or concerns, caller agreed     Reason for Disposition   Nursing judgment or information in reference    Protocols used: NO GUIDELINE [id number]

## 2020-06-12 ENCOUNTER — OFFICE VISIT (OUTPATIENT)
Dept: FAMILY MEDICINE | Facility: CLINIC | Age: 65
End: 2020-06-12
Payer: MEDICARE

## 2020-06-12 VITALS
HEIGHT: 67 IN | TEMPERATURE: 98 F | RESPIRATION RATE: 18 BRPM | SYSTOLIC BLOOD PRESSURE: 133 MMHG | BODY MASS INDEX: 26.37 KG/M2 | HEART RATE: 61 BPM | DIASTOLIC BLOOD PRESSURE: 66 MMHG | OXYGEN SATURATION: 96 % | WEIGHT: 168 LBS

## 2020-06-12 DIAGNOSIS — Z11.4 SCREENING FOR HIV (HUMAN IMMUNODEFICIENCY VIRUS): ICD-10-CM

## 2020-06-12 DIAGNOSIS — Z86.19 HISTORY OF CLOSTRIDIUM DIFFICILE INFECTION: Primary | ICD-10-CM

## 2020-06-12 DIAGNOSIS — Z11.59 ENCOUNTER FOR HEPATITIS C SCREENING TEST FOR LOW RISK PATIENT: ICD-10-CM

## 2020-06-12 DIAGNOSIS — R19.7 DIARRHEA, UNSPECIFIED TYPE: ICD-10-CM

## 2020-06-12 PROCEDURE — 3078F PR MOST RECENT DIASTOLIC BLOOD PRESSURE < 80 MM HG: ICD-10-PCS | Mod: CPTII,S$GLB,, | Performed by: NURSE PRACTITIONER

## 2020-06-12 PROCEDURE — 3008F BODY MASS INDEX DOCD: CPT | Mod: CPTII,S$GLB,, | Performed by: NURSE PRACTITIONER

## 2020-06-12 PROCEDURE — 99999 PR PBB SHADOW E&M-EST. PATIENT-LVL IV: ICD-10-PCS | Mod: PBBFAC,,, | Performed by: NURSE PRACTITIONER

## 2020-06-12 PROCEDURE — 3075F PR MOST RECENT SYSTOLIC BLOOD PRESS GE 130-139MM HG: ICD-10-PCS | Mod: CPTII,S$GLB,, | Performed by: NURSE PRACTITIONER

## 2020-06-12 PROCEDURE — 3075F SYST BP GE 130 - 139MM HG: CPT | Mod: CPTII,S$GLB,, | Performed by: NURSE PRACTITIONER

## 2020-06-12 PROCEDURE — 3078F DIAST BP <80 MM HG: CPT | Mod: CPTII,S$GLB,, | Performed by: NURSE PRACTITIONER

## 2020-06-12 PROCEDURE — 99214 OFFICE O/P EST MOD 30 MIN: CPT | Mod: S$GLB,,, | Performed by: NURSE PRACTITIONER

## 2020-06-12 PROCEDURE — 3008F PR BODY MASS INDEX (BMI) DOCUMENTED: ICD-10-PCS | Mod: CPTII,S$GLB,, | Performed by: NURSE PRACTITIONER

## 2020-06-12 PROCEDURE — 99214 PR OFFICE/OUTPT VISIT, EST, LEVL IV, 30-39 MIN: ICD-10-PCS | Mod: S$GLB,,, | Performed by: NURSE PRACTITIONER

## 2020-06-12 PROCEDURE — 99999 PR PBB SHADOW E&M-EST. PATIENT-LVL IV: CPT | Mod: PBBFAC,,, | Performed by: NURSE PRACTITIONER

## 2020-06-12 NOTE — PROGRESS NOTES
Subjective:       Patient ID: Alessia Nelson is a 64 y.o. female.    Chief Complaint: Hyperlipidemia; Flank Pain; GI Problem; and Neck Injury    Ms. Alessia Nelson is a 64 year old female who presents to the clinic today for possible C diff. Reports she has had diarrhea 4x today. Hx of C Diff in the past. She has taken oral Vancomycin and flagyl in the past. She feels that she is relapsing. Currently not on antibiotics  Reports flank pain. Reports she has upcoming appt with nephrology in [facility] transportation          Diarrhea    This is a recurrent problem. Associated symptoms include arthralgias and myalgias. Pertinent negatives include no abdominal pain, chills, coughing, fever, headaches or vomiting. Risk factors include recent antibiotic use. She has tried increased fluids and electrolyte solution for the symptoms. The treatment provided no relief.     Review of Systems   Constitutional: Positive for fatigue. Negative for activity change, appetite change, chills, fever and unexpected weight change.   HENT: Negative for congestion, ear pain, postnasal drip, sore throat and tinnitus.    Eyes: Negative for pain and visual disturbance.   Respiratory: Negative for apnea, cough, chest tightness, shortness of breath and wheezing.    Cardiovascular: Negative for chest pain, palpitations and leg swelling.   Gastrointestinal: Positive for diarrhea. Negative for abdominal distention, abdominal pain, blood in stool, constipation, nausea and vomiting.   Endocrine: Negative for polydipsia and polyuria.   Genitourinary: Negative for difficulty urinating, flank pain, frequency and urgency.   Musculoskeletal: Positive for arthralgias and myalgias. Negative for back pain and joint swelling.   Skin: Negative for color change.   Allergic/Immunologic: Negative for environmental allergies and food allergies.   Neurological: Positive for weakness. Negative for dizziness, tremors, syncope, light-headedness and  "headaches.   Hematological: Does not bruise/bleed easily.   Psychiatric/Behavioral: Negative for agitation, decreased concentration, self-injury, sleep disturbance and suicidal ideas. The patient is not nervous/anxious.          Reviewed family, medical, surgical, and social history.    Objective:      /66 (BP Location: Left arm, Patient Position: Sitting, BP Method: Medium (Automatic))   Pulse 61   Temp 98.4 °F (36.9 °C) (Oral)   Resp 18   Ht 5' 7" (1.702 m)   Wt 76.2 kg (168 lb)   SpO2 96%   BMI 26.31 kg/m²   Physical Exam   Constitutional: She is oriented to person, place, and time. She appears well-developed and well-nourished. She is cooperative. No distress.   HENT:   Head: Normocephalic and atraumatic.   Nose: Nose normal.   Mouth/Throat: Uvula is midline, oropharynx is clear and moist and mucous membranes are normal. No uvula swelling.   Eyes: Pupils are equal, round, and reactive to light. Conjunctivae, EOM and lids are normal.   Neck: Trachea normal and full passive range of motion without pain. No tracheal tenderness present. No neck rigidity. No thyroid mass present.   Cardiovascular: Normal rate, regular rhythm, S1 normal, S2 normal, normal heart sounds, intact distal pulses and normal pulses.   Pulmonary/Chest: Effort normal and breath sounds normal. No respiratory distress. She has no decreased breath sounds. She has no wheezes.   Abdominal: Soft. Normal appearance and bowel sounds are normal. She exhibits no distension and no abdominal bruit. There is no guarding and no CVA tenderness.   Musculoskeletal: She exhibits no edema, tenderness or deformity.   Lymphadenopathy:     She has no cervical adenopathy.   Neurological: She is alert and oriented to person, place, and time. She has normal strength. She exhibits normal muscle tone.   Skin: Skin is warm, dry and intact. Capillary refill takes less than 2 seconds. No abrasion, no laceration, no lesion and no rash noted. She is not " diaphoretic. No cyanosis. Nails show no clubbing.   Psychiatric: She has a normal mood and affect. Her speech is normal and behavior is normal. Judgment and thought content normal. Cognition and memory are normal.       Assessment:       1. History of Clostridium difficile infection    2. Diarrhea, unspecified type    3. Encounter for hepatitis C screening test for low risk patient    4. Screening for HIV (human immunodeficiency virus)        Plan:       History of Clostridium difficile infection  -     POCT URINE DIPSTICK WITH MICROSCOPE, AUTOMATED  -     Clostridium difficile EIA; Future; Expected date: 06/12/2020    Diarrhea, unspecified type  -     POCT URINE DIPSTICK WITH MICROSCOPE, AUTOMATED  -     Clostridium difficile EIA; Future; Expected date: 06/12/2020    Encounter for hepatitis C screening test for low risk patient  -     Hepatitis C Antibody; Future; Expected date: 06/12/2020    Screening for HIV (human immunodeficiency virus)  -     HIV 1/2 Ag/Ab (4th Gen); Future; Expected date: 06/12/2020          PLAN:  - Discussed with patient the plan of care  - Cannot urine, reports she wants to take it home and drop off  - Obtain Urine and C diff culture  - Medications reviewed. Medication side effects discussed. Patient has no questions or concerns at this time. Informed patient to notify me regarding any concerns.    - Informed patient to please notify me with any questions or concerns at anytime  - Follow up ordered for 2- 4 weeks      Risks, benefits, and side effects were discussed with the patient. All questions were answered to the fullest satisfaction of the patient, and pt verbalized understanding and agreement to treatment plan. Pt was to call with any new or worsening symptoms, or present to the ER.

## 2020-06-15 PROBLEM — J81.0 PULMONARY EDEMA, ACUTE: Status: RESOLVED | Noted: 2020-03-10 | Resolved: 2020-06-15

## 2020-06-16 ENCOUNTER — TELEPHONE (OUTPATIENT)
Dept: FAMILY MEDICINE | Facility: CLINIC | Age: 65
End: 2020-06-16

## 2020-06-16 DIAGNOSIS — R10.84 GENERALIZED ABDOMINAL PAIN: ICD-10-CM

## 2020-06-16 DIAGNOSIS — Z96.651 STATUS POST TOTAL RIGHT KNEE REPLACEMENT: ICD-10-CM

## 2020-06-16 DIAGNOSIS — G89.4 CHRONIC PAIN DISORDER: ICD-10-CM

## 2020-06-16 DIAGNOSIS — M96.1 POSTLAMINECTOMY SYNDROME OF LUMBAR REGION: ICD-10-CM

## 2020-06-16 DIAGNOSIS — R19.7 DIARRHEA, UNSPECIFIED TYPE: ICD-10-CM

## 2020-06-16 DIAGNOSIS — M53.3 SACROILIAC JOINT PAIN: ICD-10-CM

## 2020-06-16 DIAGNOSIS — Z86.19 HISTORY OF CLOSTRIDIUM DIFFICILE INFECTION: Primary | ICD-10-CM

## 2020-06-16 DIAGNOSIS — H92.03 ACUTE EAR PAIN, BILATERAL: ICD-10-CM

## 2020-06-16 DIAGNOSIS — M70.61 GREATER TROCHANTERIC BURSITIS OF RIGHT HIP: ICD-10-CM

## 2020-06-16 RX ORDER — HYDROCODONE BITARTRATE AND ACETAMINOPHEN 7.5; 325 MG/1; MG/1
1 TABLET ORAL 2 TIMES DAILY PRN
Qty: 60 TABLET | Refills: 0 | Status: SHIPPED | OUTPATIENT
Start: 2020-06-16 | End: 2020-07-20 | Stop reason: SDUPTHER

## 2020-06-16 NOTE — TELEPHONE ENCOUNTER
----- Message from Moshe Solorio sent at 6/16/2020  1:11 PM CDT -----  Type:  RX Refill Request    Who Called:  patient  Refill or New Rx:  new  RX Name and Strength:  HYDROcodone-acetaminophen (NORCO) 7.5-325 mg per tablet  How is the patient currently taking it? (ex. 1XDay):  Take 1 tablet by mouth 2 (two) times daily as needed for Pain. - Oral  Is this a 30 day or 90 day RX:  60 tablet 0 5/19/2020   Preferred Pharmacy with phone number:    [facility] #[id number] - [place] [address] AT [facility] [address]  [address] [address]  Phone: [phone] Fax: [phone]    Ordering Provider:  previously Dr. Margo Silva Call Back Number:  [phone]  Additional Information:  na

## 2020-06-16 NOTE — TELEPHONE ENCOUNTER
----- Message from Moshe ROBIN Frihenny sent at 6/16/2020  1:07 PM CDT -----  Contact: patient  Patient called in and stated she was seen on 6/12/2020 and stated she thinks she has colitus due to poor eating habits.  Patient would like to get a referral to see Dr. Manpreet Fraga, Gastrology.    Patient stated she also needs a referral to see an ENT for her earaches.    Patient call back number is [phone]

## 2020-06-24 ENCOUNTER — TELEPHONE (OUTPATIENT)
Dept: FAMILY MEDICINE | Facility: CLINIC | Age: 65
End: 2020-06-24

## 2020-06-24 NOTE — TELEPHONE ENCOUNTER
----- Message from Bebeto Dudley sent at 6/24/2020  4:16 PM CDT -----  Regarding: pt referral  Type:  Patient Requesting Referral    Who Called:  pt  Does the patient already have the specialty appointment scheduled?:  no  If yes, what is the date of that appointment?:  n/a  Referral to What Specialty:  ENT  Reason for Referral:  H92.03 (ICD-10-CM) - Acute ear pain, bilateral  Does the patient want the referral with a specific physician?:  no  Is the specialist an [facility] or [facility] Physician?:  no  Patient Requesting a Call Back?:  yes  Best Call Back Number:  [phone] (home) [phone] (work)  Additional Information:   pt has referral on elizabeth with Dr Mitul Coffman but this Dr is no longer with [facility]. Therefore cannot schedule with this referral. Please refer to another [facility] or [facility] in [place] area.

## 2020-06-24 NOTE — TELEPHONE ENCOUNTER
Called patient back and advised that we already sent a referral to Dr. Mitul Belle.  Gave her the number to his office to call and schedule.  Patient thought [facility] was supposed to call her with an appointment.

## 2020-07-06 ENCOUNTER — OFFICE VISIT (OUTPATIENT)
Dept: GASTROENTEROLOGY | Facility: CLINIC | Age: 65
End: 2020-07-06
Payer: MEDICARE

## 2020-07-06 ENCOUNTER — LAB VISIT (OUTPATIENT)
Dept: LAB | Facility: HOSPITAL | Age: 65
End: 2020-07-06
Attending: INTERNAL MEDICINE
Payer: MEDICARE

## 2020-07-06 VITALS
HEIGHT: 67 IN | OXYGEN SATURATION: 98 % | SYSTOLIC BLOOD PRESSURE: 127 MMHG | RESPIRATION RATE: 18 BRPM | BODY MASS INDEX: 26.37 KG/M2 | WEIGHT: 168 LBS | HEART RATE: 62 BPM | TEMPERATURE: 98 F | DIASTOLIC BLOOD PRESSURE: 66 MMHG

## 2020-07-06 DIAGNOSIS — R10.84 GENERALIZED ABDOMINAL PAIN: ICD-10-CM

## 2020-07-06 DIAGNOSIS — Z86.19 HISTORY OF CLOSTRIDIUM DIFFICILE INFECTION: ICD-10-CM

## 2020-07-06 DIAGNOSIS — Z90.49 HISTORY OF CHOLECYSTECTOMY: ICD-10-CM

## 2020-07-06 DIAGNOSIS — K57.30 DIVERTICULOSIS OF LARGE INTESTINE WITHOUT HEMORRHAGE: ICD-10-CM

## 2020-07-06 DIAGNOSIS — R11.0 NAUSEA: ICD-10-CM

## 2020-07-06 DIAGNOSIS — Z12.11 COLON CANCER SCREENING: ICD-10-CM

## 2020-07-06 DIAGNOSIS — D64.9 ANEMIA, UNSPECIFIED TYPE: Primary | ICD-10-CM

## 2020-07-06 DIAGNOSIS — K52.9 CHRONIC DIARRHEA: ICD-10-CM

## 2020-07-06 DIAGNOSIS — R19.7 DIARRHEA, UNSPECIFIED TYPE: ICD-10-CM

## 2020-07-06 DIAGNOSIS — K21.9 GASTROESOPHAGEAL REFLUX DISEASE, ESOPHAGITIS PRESENCE NOT SPECIFIED: ICD-10-CM

## 2020-07-06 DIAGNOSIS — K29.70 GASTROESOPHAGITIS: ICD-10-CM

## 2020-07-06 DIAGNOSIS — K86.1 CHRONIC PANCREATITIS, UNSPECIFIED PANCREATITIS TYPE: ICD-10-CM

## 2020-07-06 DIAGNOSIS — K20.90 GASTROESOPHAGITIS: ICD-10-CM

## 2020-07-06 DIAGNOSIS — Z87.19 HISTORY OF PANCREATITIS: ICD-10-CM

## 2020-07-06 DIAGNOSIS — E53.8 B12 DEFICIENCY: ICD-10-CM

## 2020-07-06 PROCEDURE — 82274 ASSAY TEST FOR BLOOD FECAL: CPT

## 2020-07-06 PROCEDURE — 3074F SYST BP LT 130 MM HG: CPT | Mod: CPTII,S$GLB,, | Performed by: INTERNAL MEDICINE

## 2020-07-06 PROCEDURE — 3008F PR BODY MASS INDEX (BMI) DOCUMENTED: ICD-10-PCS | Mod: CPTII,S$GLB,, | Performed by: INTERNAL MEDICINE

## 2020-07-06 PROCEDURE — 3074F PR MOST RECENT SYSTOLIC BLOOD PRESSURE < 130 MM HG: ICD-10-PCS | Mod: CPTII,S$GLB,, | Performed by: INTERNAL MEDICINE

## 2020-07-06 PROCEDURE — 99214 PR OFFICE/OUTPT VISIT, EST, LEVL IV, 30-39 MIN: ICD-10-PCS | Mod: S$GLB,,, | Performed by: INTERNAL MEDICINE

## 2020-07-06 PROCEDURE — 99999 PR PBB SHADOW E&M-EST. PATIENT-LVL V: CPT | Mod: PBBFAC,,, | Performed by: INTERNAL MEDICINE

## 2020-07-06 PROCEDURE — 99999 PR PBB SHADOW E&M-EST. PATIENT-LVL V: ICD-10-PCS | Mod: PBBFAC,,, | Performed by: INTERNAL MEDICINE

## 2020-07-06 PROCEDURE — 3078F DIAST BP <80 MM HG: CPT | Mod: CPTII,S$GLB,, | Performed by: INTERNAL MEDICINE

## 2020-07-06 PROCEDURE — 3078F PR MOST RECENT DIASTOLIC BLOOD PRESSURE < 80 MM HG: ICD-10-PCS | Mod: CPTII,S$GLB,, | Performed by: INTERNAL MEDICINE

## 2020-07-06 PROCEDURE — 99214 OFFICE O/P EST MOD 30 MIN: CPT | Mod: S$GLB,,, | Performed by: INTERNAL MEDICINE

## 2020-07-06 PROCEDURE — 3008F BODY MASS INDEX DOCD: CPT | Mod: CPTII,S$GLB,, | Performed by: INTERNAL MEDICINE

## 2020-07-06 RX ORDER — POTASSIUM CHLORIDE 20 MEQ/1
TABLET, EXTENDED RELEASE ORAL
COMMUNITY
Start: 2020-06-16 | End: 2020-09-21

## 2020-07-06 NOTE — PATIENT INSTRUCTIONS
She will be scheduled for follow-up EUS.  In the interval she continues her reflux regimen and Colestid and current medications and vitamins and minerals.  She gives herself IM  B12.  Labs and stool studies will be obtained.  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

## 2020-07-06 NOTE — LETTER
July 6, 2020      Cheryl Bradley, ANUM  [address]  [place] [address]           [facility]  [address]  [place] [address]  Phone: [phone]  Fax: [phone]          Patient: Alessia Nelson   MR Number: [id number]   YOB: 1955   Date of Visit: 7/6/2020       Dear Cheryl Bradley:    Thank you for referring Alessia Nelson to me for evaluation. Attached you will find relevant portions of my assessment and plan of care.    If you have questions, please do not hesitate to call me. I look forward to following Alessia Nelson along with you.    Sincerely,    Manpreet Fraga MD    Enclosure  CC:  No Recipients    If you would like to receive this communication electronically, please contact [email or web address] or [phone] to request more information on [employer] Link access.    For providers and/or their staff who would like to refer a patient to [facility], please contact us through our one-stop-shop provider referral line, [facility], at [phone].    If you feel you have received this communication in error or would no longer like to receive these types of communications, please e-mail [email or web address]

## 2020-07-06 NOTE — PROGRESS NOTES
"Subjective:       Patient ID: Alessia Nelson is a 64 y.o. female.    Chief Complaint: Follow-up (Pt had ercp w Sanjuana Bridges, also has had 2 bouts of C-Diff sicne we last saw her)    She has MEN 1.  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  She she was scheduled for follow-up EUS but she does not know the date..  The syndrome he is associated with parathyroid but to a Kelly and also multiple pancreatic and duodenal tumors.  She has a long history of chronic diarrhea.  She has had a cholecystectomy.  She uses the Colestid as needed.  She states that if he uses it on a daily basis she becomes severely constipated and this causes her to have abdominal pain.  She wants to have semi solid bowel movements.  She denies fever chills jaundice or bleeding.  In December she had orthopedic surgery and was on multiple antibiotics.  In March she was hospitalized for 8 days for Clostridium difficile.  She lost 5-10 lb at that time because of poor appetite.  In May she states she had severe diarrhea which was different from her usual diarrhea and the home health staff determine that she had C difficile.  Currently she is having episodes of her usual diarrhea but also with constipation.  She uses the Colestid yesterday and had a formed bowel movement today.  She has pyrosis and dyspepsia.  She has a history gastroesophageal reflux and also diverticulosis.  She states that is time for her to have upper endoscopy and colonoscopy.  She is physically active.      Allergies:  Review of patient's allergies indicates:   Allergen Reactions    Aspirin      "Makes stomach feel like it's on fire"    Lortab [hydrocodone-acetaminophen] Itching    Phenergan [promethazine]      SEIZURES    Promethazine hcl        Medications:    Current Outpatient Medications:     acetaminophen (TYLENOL) 325 MG tablet, Take 2 tablets (650 mg total) by mouth every 6 (six) hours as needed for Pain (Do not take with any other tylenol " containing products)., Disp: , Rfl: 0    allopurinol (ZYLOPRIM) 300 MG tablet, Take 1 tablet (300 mg total) by mouth once daily., Disp: 90 tablet, Rfl: 3    baclofen (LIORESAL) 10 MG tablet, TAKE 1 TABLET(10 MG) BY MOUTH TWICE DAILY, Disp: 60 tablet, Rfl: 2    benazepril (LOTENSIN) 20 MG tablet, Take 1 tablet (20 mg total) by mouth once daily. (Patient taking differently: Take 20 mg by mouth 2 (two) times daily. ), Disp: 30 tablet, Rfl: 11    buPROPion (WELLBUTRIN XL) 150 MG TB24 tablet, TAKE 1 TABLET EVERY DAY, Disp: 90 tablet, Rfl: 3    clopidogreL (PLAVIX) 75 mg tablet, TAKE 1 TABLET EVERY DAY, Disp: 90 tablet, Rfl: 0    cyanocobalamin 1,000 mcg/mL injection, INJECT 1 ML INTO THE MUSCLE EVERY 14 DAYS  (VIAL EXPIRES 28 DAYS AFER OPENING), Disp: 6 mL, Rfl: 0    diclofenac sodium (VOLTAREN) 1 % Gel, Apply 2 g topically 3 (three) times daily., Disp: 100 g, Rfl: 2    escitalopram oxalate (LEXAPRO) 20 MG tablet, Take 1 tablet (20 mg total) by mouth every evening., Disp: 90 tablet, Rfl: 2    gabapentin (NEURONTIN) 400 MG capsule, Take 1 capsule (400 mg total) by mouth 4 (four) times daily., Disp: 360 capsule, Rfl: 3    hydroCHLOROthiazide (HYDRODIURIL) 12.5 MG Tab, Take 1 tablet (12.5 mg total) by mouth once daily., Disp: 30 tablet, Rfl: 11    HYDROcodone-acetaminophen (NORCO) 7.5-325 mg per tablet, Take 1 tablet by mouth 2 (two) times daily as needed for Pain., Disp: 60 tablet, Rfl: 0    lactobacillus acidophilus & bulgar (LACTINEX) 100 million cell packet, Take 1 packet (1 each total) by mouth 3 (three) times daily., Disp: 90 tablet, Rfl: 2    levothyroxine (SYNTHROID) 75 MCG tablet, Take 1 tablet (75 mcg total) by mouth once daily., Disp: 90 tablet, Rfl: 3    lidocaine (LIDODERM) 5 %, Place 2 patches onto the skin once daily. Remove & Discard patch within 12 hours or as directed by MD, Disp: 180 patch, Rfl: 3    magnesium oxide (MAG-OX) 400 mg (241.3 mg magnesium) tablet, Take 1 tablet (400 mg total)  by mouth 2 (two) times daily., Disp: , Rfl: 0    methocarbamoL (ROBAXIN) 750 MG Tab, , Disp: , Rfl:     ondansetron (ZOFRAN) 4 MG tablet, Take 2 tablets (8 mg total) by mouth every 8 (eight) hours as needed for Nausea., Disp: 100 tablet, Rfl: 0    pantoprazole (PROTONIX) 40 MG tablet, Take 1 tablet (40 mg total) by mouth once daily., Disp: 90 tablet, Rfl: 3    potassium chloride SA (K-DUR,KLOR-CON) 20 MEQ tablet, , Disp: , Rfl:     rosuvastatin (CRESTOR) 20 MG tablet, Take 1 tablet (20 mg total) by mouth once daily., Disp: 90 tablet, Rfl: 3    traZODone (DESYREL) 100 MG tablet, TAKE 1 TABLET (100 MG TOTAL) BY MOUTH EVERY EVENING., Disp: 90 tablet, Rfl: 3    metoprolol tartrate (LOPRESSOR) 100 MG tablet, Take 1 tablet (100 mg total) by mouth 2 (two) times daily., Disp: 180 tablet, Rfl: 2    nicotine (NICODERM CQ) 14 mg/24 hr, Place 1 patch onto the skin once daily. (Patient not taking: Reported on 6/12/2020), Disp: , Rfl: 0    vancomycin (VANCOCIN) 125 MG capsule, , Disp: , Rfl:     Past Medical History:   Diagnosis Date    Acute pancreatitis     CAD (coronary artery disease)     CHF (congestive heart failure)     COPD (chronic obstructive pulmonary disease)     Depression     Diverticulosis     Encounter for blood transfusion     GERD (gastroesophageal reflux disease)     History of blood clots     1986 AFTER BOWEL RESCETION    History of bowel resection     Hypertension     Peritonitis     Seizures     HAD A SEIZURE FROM PHENERGAN     Thyroid disease     TIA (transient ischemic attack)        Past Surgical History:   Procedure Laterality Date    ADRENAL GLAND SURGERY      APPENDECTOMY      BACK SURGERY      CHOLECYSTECTOMY      COLONOSCOPY      CORONARY STENT PLACEMENT      CYSTOURETHROSCOPY N/A 11/13/2019    Procedure: CYSTOURETHROSCOPY;  Surgeon: Shun Nuñez MD;  Location[facility];  Service: Urology;  Laterality: N/A;    ENDOSCOPIC ULTRASOUND OF UPPER GASTROINTESTINAL TRACT  N/A 11/25/2019    Procedure: ULTRASOUND, UPPER GI TRACT, ENDOSCOPIC;  Surgeon: Alhaji Bridges MD;  Location: [facility] ENDO ([facility] FLR);  Service: Endoscopy;  Laterality: N/A;  5 day hold Plavix, Dr Jovanni Serrano - pg  PM prep    HERNIA REPAIR      HYSTERECTOMY      INCISIONAL HERNIA REPAIR      INJECTION OF ANESTHETIC AGENT AROUND NERVE Right 5/27/2019    Procedure: RIGHT L5-S3 MEDIAL BRANCH BLOCKS;  Surgeon: Sneha Aragon MD;  Location: [facility] OR;  Service: Pain Management;  Laterality: Right;  **DO NOT STOP PLAVIX**    instestine      KNEE ARTHROPLASTY Right 12/18/2019    Procedure: ARTHROPLASTY, KNEE;  Surgeon: Ike Briceño II, MD;  Location: [facility] OR;  Service: Orthopedics;  Laterality: Right;    KNEE SURGERY  1983    PARATHYROID GLAND SURGERY      3 surgeries    RADIOFREQUENCY ABLATION Right 6/10/2019    Procedure: Radiofrequency Ablation - RIGHT L3-5 RADIOFREQUENCY ABLATION WITH HALYARD COOLIEF THERMAL SYSTEM;  Surgeon: Sneha Aragon MD;  Location: [facility] OR;  Service: Pain Management;  Laterality: Right;  **HOLD PLAVIX x 7 DAYS PRIOR**    UPPER GASTROINTESTINAL ENDOSCOPY           Review of Systems   Constitutional: Negative for appetite change, fever and unexpected weight change.   HENT: Negative for trouble swallowing.         No jaundice.   Respiratory: Negative for cough, shortness of breath and wheezing.         She is a daily cigarette smoker.  She has been offered the smoking cessation program in the past and she states she will consider this option.  She denies aspiration or dysphagia.  She has minimal coughing and minimal sputum production.  She denies dyspnea on exertion but is not as physically active.   Cardiovascular: Negative for chest pain.        She has coronary artery disease with stent placement.  She denies exertional chest pain or rhythm disturbance.  She states her hyperlipidemia and hypertension well controlled.   Gastrointestinal: Positive for diarrhea and nausea. Negative  for abdominal distention, abdominal pain, anal bleeding, blood in stool, constipation and rectal pain.        She has nausea but without vomiting.  She cannot pinpoint a precipitating factor.  Colonoscopy in the past as revealed hemorrhoids and diverticulosis.  She has a history gastroesophageal reflux but states her current medications have resolved her symptoms that she spicy or fatty foods although she is trying to avoid the offending foods.  She has had gastrointestinal surgery.   Endocrine:        She has had parathyroid surgery.   Musculoskeletal: Positive for arthralgias and back pain. Negative for neck pain.        She has history of arthritis and fibromyalgia.   Skin: Negative for pallor and rash.   Neurological: Negative for dizziness, seizures, syncope, speech difficulty, weakness and numbness.        She has a history of possible TIA.  She denies neurologic symptoms except for occasional tiredness.  She denies falling   Hematological: Negative for adenopathy.   Psychiatric/Behavioral: Negative for confusion.       Objective:      Physical Exam  Vitals signs reviewed. Exam conducted with a chaperone present.   Constitutional:       Appearance: She is well-developed.      Comments: Well-nourished well-hydrated overweight afebrile nonicteric white female.  She is oriented x3.  She can relate her history and answer questions appropriately.  She is normocephalic.  Pupils are normal.   HENT:      Head: Normocephalic.   Eyes:      Pupils: Pupils are equal, round, and reactive to light.   Neck:      Musculoskeletal: Normal range of motion and neck supple.      Thyroid: No thyromegaly.      Trachea: No tracheal deviation.      Comments: Surgical scar.  Cardiovascular:      Rate and Rhythm: Normal rate and regular rhythm.      Heart sounds: Normal heart sounds.   Pulmonary:      Effort: Pulmonary effort is normal.      Breath sounds: Normal breath sounds.   Abdominal:      General: There is no distension.       Palpations: There is no mass.      Tenderness: There is no abdominal tenderness. There is no left CVA tenderness, guarding or rebound.      Hernia: No hernia is present.      Comments: Surgical scars.   Musculoskeletal: Normal range of motion.      Comments: She can ambulate normally.  She can go from the sitting the standing position without difficulty.   Lymphadenopathy:      Cervical: No cervical adenopathy.   Skin:     General: Skin is warm and dry.   Neurological:      Mental Status: She is alert and oriented to person, place, and time.      Cranial Nerves: No cranial nerve deficit.   Psychiatric:         Behavior: Behavior normal.           Plan:       Anemia, unspecified type  -     CBC auto differential; Future; Expected date: 07/06/2020  -     Comprehensive metabolic panel; Future; Expected date: 07/06/2020    History of Clostridium difficile infection  -     Ambulatory referral/consult to Gastroenterology    Diarrhea, unspecified type  -     Ambulatory referral/consult to Gastroenterology  -     Pancreatic elastase, fecal; Future; Expected date: 07/06/2020    Generalized abdominal pain  -     Ambulatory referral/consult to Gastroenterology    B12 deficiency  -     Vitamin B12; Future; Expected date: 07/06/2020    Colon cancer screening  -     Fecal Immunochemical Test (iFOBT); Future; Expected date: 07/06/2020    Chronic pancreatitis, unspecified pancreatitis type  -     Ambulatory referral/consult to Gastroenterology; Future; Expected date: 07/13/2020    Nausea    History of pancreatitis    Diverticulosis of large intestine without hemorrhage    History of cholecystectomy    Chronic diarrhea    Gastroesophagitis    Gastroesophageal reflux disease, esophagitis presence not specified     She will continue her reflux regimen current medications vitamins and minerals.  She is referred initially for the EUS and then she will need upper endoscopy and colonoscopy.  She will use the Colestid as indicated.  She  will make a food diary and avoid the offending foods.

## 2020-07-08 ENCOUNTER — LAB VISIT (OUTPATIENT)
Dept: LAB | Facility: HOSPITAL | Age: 65
End: 2020-07-08
Attending: INTERNAL MEDICINE
Payer: MEDICARE

## 2020-07-08 DIAGNOSIS — R19.7 DIARRHEA, UNSPECIFIED TYPE: ICD-10-CM

## 2020-07-08 PROCEDURE — 82656 EL-1 FECAL QUAL/SEMIQ: CPT

## 2020-07-09 ENCOUNTER — LAB VISIT (OUTPATIENT)
Dept: LAB | Facility: HOSPITAL | Age: 65
End: 2020-07-09
Attending: INTERNAL MEDICINE
Payer: MEDICARE

## 2020-07-09 DIAGNOSIS — R19.7 DIARRHEA, UNSPECIFIED TYPE: Primary | ICD-10-CM

## 2020-07-09 DIAGNOSIS — E53.8 B12 DEFICIENCY: ICD-10-CM

## 2020-07-09 DIAGNOSIS — R11.0 NAUSEA: ICD-10-CM

## 2020-07-09 DIAGNOSIS — D64.9 ANEMIA, UNSPECIFIED TYPE: ICD-10-CM

## 2020-07-09 LAB
ALBUMIN SERPL BCP-MCNC: 3.3 G/DL (ref 3.5–5.2)
ALP SERPL-CCNC: 110 U/L (ref 55–135)
ALT SERPL W/O P-5'-P-CCNC: 9 U/L (ref 10–44)
ANION GAP SERPL CALC-SCNC: 8 MMOL/L (ref 8–16)
AST SERPL-CCNC: 12 U/L (ref 10–40)
BASOPHILS # BLD AUTO: 0.06 K/UL (ref 0–0.2)
BASOPHILS NFR BLD: 0.7 % (ref 0–1.9)
BILIRUB SERPL-MCNC: 0.2 MG/DL (ref 0.1–1)
BUN SERPL-MCNC: 15 MG/DL (ref 8–23)
CALCIUM SERPL-MCNC: 9.6 MG/DL (ref 8.7–10.5)
CHLORIDE SERPL-SCNC: 105 MMOL/L (ref 95–110)
CO2 SERPL-SCNC: 25 MMOL/L (ref 23–29)
CREAT SERPL-MCNC: 0.8 MG/DL (ref 0.5–1.4)
DIFFERENTIAL METHOD: ABNORMAL
EOSINOPHIL # BLD AUTO: 0.3 K/UL (ref 0–0.5)
EOSINOPHIL NFR BLD: 3.8 % (ref 0–8)
ERYTHROCYTE [DISTWIDTH] IN BLOOD BY AUTOMATED COUNT: 15.9 % (ref 11.5–14.5)
EST. GFR  (AFRICAN AMERICAN): >60 ML/MIN/1.73 M^2
EST. GFR  (NON AFRICAN AMERICAN): >60 ML/MIN/1.73 M^2
GLUCOSE SERPL-MCNC: 110 MG/DL (ref 70–110)
HCT VFR BLD AUTO: 34.8 % (ref 37–48.5)
HGB BLD-MCNC: 11 G/DL (ref 12–16)
IMM GRANULOCYTES # BLD AUTO: 0.04 K/UL (ref 0–0.04)
IMM GRANULOCYTES NFR BLD AUTO: 0.5 % (ref 0–0.5)
LYMPHOCYTES # BLD AUTO: 3.1 K/UL (ref 1–4.8)
LYMPHOCYTES NFR BLD: 35.6 % (ref 18–48)
MCH RBC QN AUTO: 30.2 PG (ref 27–31)
MCHC RBC AUTO-ENTMCNC: 31.6 G/DL (ref 32–36)
MCV RBC AUTO: 96 FL (ref 82–98)
MONOCYTES # BLD AUTO: 0.8 K/UL (ref 0.3–1)
MONOCYTES NFR BLD: 9.2 % (ref 4–15)
NEUTROPHILS # BLD AUTO: 4.4 K/UL (ref 1.8–7.7)
NEUTROPHILS NFR BLD: 50.2 % (ref 38–73)
NRBC BLD-RTO: 0 /100 WBC
PLATELET # BLD AUTO: 250 K/UL (ref 150–350)
PMV BLD AUTO: 8.9 FL (ref 9.2–12.9)
POTASSIUM SERPL-SCNC: 4.1 MMOL/L (ref 3.5–5.1)
PROT SERPL-MCNC: 6.4 G/DL (ref 6–8.4)
RBC # BLD AUTO: 3.64 M/UL (ref 4–5.4)
SODIUM SERPL-SCNC: 138 MMOL/L (ref 136–145)
WBC # BLD AUTO: 8.67 K/UL (ref 3.9–12.7)

## 2020-07-09 PROCEDURE — 85025 COMPLETE CBC W/AUTO DIFF WBC: CPT

## 2020-07-09 PROCEDURE — 80053 COMPREHEN METABOLIC PANEL: CPT

## 2020-07-09 PROCEDURE — 36415 COLL VENOUS BLD VENIPUNCTURE: CPT

## 2020-07-09 PROCEDURE — 82607 VITAMIN B-12: CPT

## 2020-07-09 RX ORDER — MONTELUKAST SODIUM 4 MG/1
2 TABLET, CHEWABLE ORAL DAILY
Qty: 180 TABLET | Refills: 3 | Status: SHIPPED | OUTPATIENT
Start: 2020-07-09 | End: 2021-06-03

## 2020-07-09 RX ORDER — ONDANSETRON 4 MG/1
8 TABLET, ORALLY DISINTEGRATING ORAL EVERY 8 HOURS PRN
Qty: 180 TABLET | Refills: 2 | Status: SHIPPED | OUTPATIENT
Start: 2020-07-09 | End: 2020-07-20 | Stop reason: SDUPTHER

## 2020-07-10 LAB — VIT B12 SERPL-MCNC: 420 PG/ML (ref 210–950)

## 2020-07-14 ENCOUNTER — PATIENT OUTREACH (OUTPATIENT)
Dept: ADMINISTRATIVE | Facility: HOSPITAL | Age: 65
End: 2020-07-14

## 2020-07-15 LAB — HEMOCCULT STL QL IA: NEGATIVE

## 2020-07-16 LAB — ELASTASE 1, FECAL: 312 MCG/G

## 2020-07-17 ENCOUNTER — PATIENT OUTREACH (OUTPATIENT)
Dept: ADMINISTRATIVE | Facility: HOSPITAL | Age: 65
End: 2020-07-17

## 2020-07-17 DIAGNOSIS — Z12.31 ENCOUNTER FOR SCREENING MAMMOGRAM FOR MALIGNANT NEOPLASM OF BREAST: ICD-10-CM

## 2020-07-17 DIAGNOSIS — Z12.39 SCREENING FOR BREAST CANCER: Primary | ICD-10-CM

## 2020-07-20 ENCOUNTER — OFFICE VISIT (OUTPATIENT)
Dept: PAIN MEDICINE | Facility: CLINIC | Age: 65
End: 2020-07-20
Payer: MEDICARE

## 2020-07-20 VITALS
WEIGHT: 168.31 LBS | HEIGHT: 67 IN | DIASTOLIC BLOOD PRESSURE: 67 MMHG | TEMPERATURE: 99 F | SYSTOLIC BLOOD PRESSURE: 120 MMHG | RESPIRATION RATE: 17 BRPM | BODY MASS INDEX: 26.42 KG/M2 | HEART RATE: 55 BPM

## 2020-07-20 DIAGNOSIS — M79.18 MYOFASCIAL PAIN: Primary | ICD-10-CM

## 2020-07-20 DIAGNOSIS — M53.3 SACROILIAC JOINT PAIN: ICD-10-CM

## 2020-07-20 DIAGNOSIS — M70.61 GREATER TROCHANTERIC BURSITIS OF RIGHT HIP: ICD-10-CM

## 2020-07-20 DIAGNOSIS — Z96.651 STATUS POST TOTAL RIGHT KNEE REPLACEMENT: ICD-10-CM

## 2020-07-20 DIAGNOSIS — M96.1 POSTLAMINECTOMY SYNDROME OF LUMBAR REGION: ICD-10-CM

## 2020-07-20 DIAGNOSIS — G89.4 CHRONIC PAIN DISORDER: ICD-10-CM

## 2020-07-20 PROCEDURE — 3078F PR MOST RECENT DIASTOLIC BLOOD PRESSURE < 80 MM HG: ICD-10-PCS | Mod: CPTII,S$GLB,, | Performed by: NURSE PRACTITIONER

## 2020-07-20 PROCEDURE — 3008F PR BODY MASS INDEX (BMI) DOCUMENTED: ICD-10-PCS | Mod: CPTII,S$GLB,, | Performed by: NURSE PRACTITIONER

## 2020-07-20 PROCEDURE — 3078F DIAST BP <80 MM HG: CPT | Mod: CPTII,S$GLB,, | Performed by: NURSE PRACTITIONER

## 2020-07-20 PROCEDURE — 3074F PR MOST RECENT SYSTOLIC BLOOD PRESSURE < 130 MM HG: ICD-10-PCS | Mod: CPTII,S$GLB,, | Performed by: NURSE PRACTITIONER

## 2020-07-20 PROCEDURE — 3074F SYST BP LT 130 MM HG: CPT | Mod: CPTII,S$GLB,, | Performed by: NURSE PRACTITIONER

## 2020-07-20 PROCEDURE — 99214 PR OFFICE/OUTPT VISIT, EST, LEVL IV, 30-39 MIN: ICD-10-PCS | Mod: S$GLB,,, | Performed by: NURSE PRACTITIONER

## 2020-07-20 PROCEDURE — 99214 OFFICE O/P EST MOD 30 MIN: CPT | Mod: S$GLB,,, | Performed by: NURSE PRACTITIONER

## 2020-07-20 PROCEDURE — 3008F BODY MASS INDEX DOCD: CPT | Mod: CPTII,S$GLB,, | Performed by: NURSE PRACTITIONER

## 2020-07-20 PROCEDURE — 99999 PR PBB SHADOW E&M-EST. PATIENT-LVL V: ICD-10-PCS | Mod: PBBFAC,,, | Performed by: NURSE PRACTITIONER

## 2020-07-20 PROCEDURE — 99999 PR PBB SHADOW E&M-EST. PATIENT-LVL V: CPT | Mod: PBBFAC,,, | Performed by: NURSE PRACTITIONER

## 2020-07-20 RX ORDER — HYDROCODONE BITARTRATE AND ACETAMINOPHEN 7.5; 325 MG/1; MG/1
1 TABLET ORAL 2 TIMES DAILY PRN
Qty: 60 TABLET | Refills: 0 | Status: SHIPPED | OUTPATIENT
Start: 2020-07-20 | End: 2020-08-20 | Stop reason: SDUPTHER

## 2020-07-20 RX ORDER — METHOCARBAMOL 750 MG/1
750 TABLET, FILM COATED ORAL 3 TIMES DAILY
Qty: 90 TABLET | Refills: 3 | Status: ON HOLD | OUTPATIENT
Start: 2020-07-20 | End: 2020-11-02

## 2020-07-20 NOTE — PROGRESS NOTES
FOLLOW UP NOTE:     CHIEF COMPLAINT: lower back and right hip pain    INITIAL HISTORY OF PRESENT ILLNESS: (Per Dr. Aragon)  Alessia Nelson is a 64 y.o. female who presents today with chronic low back pain.  She has a history of 2 lumbar surgeries in the past as well as a cervical surgery.  She reports bilateral low back pain that radiates into her right buttock and the posterior aspect of her right leg to her knee and sometimes to her calf.  She does have numbness in bilateral feet attributed to diabetic peripheral neuropathy. This is separate from her low back symptoms.   This pain is described in detail below.     Of note, she has had 4 falls, one from her BP dropping, one while she was asleep, and two others of unknown causes.     She carries a diagnosis of fibromyalgia, but she states that she does not believe she has this.     Aggravating factors:  Sitting, standing, walking, bending/twisting, lifting, exercise     Mitigating factors:  Rest, lying down, medication     Previously seeing: Dr. Cardozo, Kyler Watson PA-C, Dr. Brent Lewis    Outside records from [facility]:  Office visit from 04/18/2019:  Show ongoing treatment for her low back pain. Makes a note of a recent SI joint injection with no benefit.  Makes note of an EMG which shows neuropathy.  Shows ongoing treatment with Norco 7.5/325 mg twice daily as needed with no issues.  Other office visits from 02/26/2019, 12/20/2018, 10/25/2018 all show stable treatment with Norco 7.5/325 mg twice daily as needed with no issues.  One office visit from 11/2014 show treatment with Norco 10/325 mg with no issues    INTERVAL HISTORY OF PRESENT ILLNESS: Alessia Nelson is a 64 y.o. female with a long history of spinal problems. She has had 2 lumbar surgeries with a fusion to L4 - L5 and has had a cervical surgery as well. She was set to trial a SCS and cancelled due to personal patient concerns. She has a complicated medical history and ongoing  "problems with bladder dysfunction, constipation, C.Diff with hospitalization, CAD, CHF, DVT, HTN, TIA and thyroid disease.   She presents today as an established patient of Dr. Aragon but new to me for the continued management of back pain and hip pain. She reports that currently she feels that her pain is stable on her current regimen. She currently rates her pain as a 4/10 to her lower back.  She describes her pain as a burning and aching type of pain across her lower back. She says that if she does too much activity or stands in one place too long the right hip begins to give her pain and this will run a burning pain down the lateral aspect of her right leg to her knee.   She reports that the most benefit she has gotten for her pain has been medication therapy.  Of note, she has been doing some therapy in the swimming pool to strengthen her right knee and her back. She is tolerating this better than she has in PT in the past.   Patient denies of any urinary/fecal incontinence, saddle anesthesia, or weakness.       INTERVENTIONAL PAIN HISTORY:  · L4/5 Epidural steroid injection: For many years.  She is unsure of the last one  · Right SI joint: No benefit  · Right L5-S3 MBB: positive  · 06/10/2019:  Right L5-S3 RFA:  50% benefit  · 06/25/2019:  Right greater trochanteric bursa injection:  50% benefit  · Right knee intra-articular injection:  Limited benefit     Relevant Surgeries:   12/18/2019: Right knee Arthroplasty (Dr. Briceño)  · 2006: Lumbar surgery  · 2007: Lumbar surgery  · 2012: Lumbar surgery, PISF  · 2006: Cervical surgery  ·   Anticoagulation: yes; Plavix    CURRENT PAIN MEDICATIONS:   Baclofen 10mg 1 - 2 times daily  methocarbamol 750 mg 2 - 3 times daily  Tylenol 650 mg 2 times a day as needed  Norco 7.5-325 mg twice daily as needed for pain  Gabapentin 400 mg twice daily (lower dose due to "kidneys")  Lidoderm 5% patches twice daily    :  6/19/2020: Norco 7.5-325 mg #60 tablets  5/19/2020: Norco " "7.5-325 mg #60 tablets  4/14/2020: Norco 7.5-325 mg #60 tablets  3/12/2020: Norco 7.5-325 mg #60 tablets  2/8/2020: Norco 7.5-325 mg #60 tablets  1/6/2020: Norco 7.5-325 mg #60 tablets  12/19/2019: Norco 7.5-325 mg #28 tablets  12/2/2019: Norco 7.5-325 mg #60 tablets  10/28/2019: Norco 7.5-325 mg #60 tablets  10/3/2019: Norco 7.5-325 mg #60 tablets  *consistent previous to this date with same dosage*    IMAGING: no new imaging to review     ROS:  GENERAL:  Positive for fatigue.  No weight loss, malaise or fevers.  HEENT:   No recent changes in vision or hearing  NECK:  Negative for lumps, no difficulty with swallowing.  RESPIRATORY:  Positive for chronic cough.  Negative for wheezing or shortness of breath, patient denies any recent URI.  CARDIOVASCULAR:  Negative for chest pain, leg swelling or palpitations.  GI:  Positive for diarrhea and constipation intermittently.  Negative for abdominal discomfort, blood in stools or black stools or change in bowel habits.  MUSCULOSKELETAL:  See HPI.  SKIN:  Positive for color change, texture change since hospitalization for C.diff.  Negative for lesions, rash, and itching.  PSYCH:  Positive for anxiety.  No other mood disorder or recent psychosocial stressors.    HEMATOLOGY/LYMPHOLOGY:  Positive for easy bruising (takes Plavix).  Negative for prolonged bleeding or swollen nodes.    ENDO:  Positive for hypothyroidism and diabetes.    NEURO:   Positive for headaches, loss of balance, difficulty sleeping.  No history of syncope, paralysis, seizures or tremors.  All other reviewed and negative other than HPI.    MEDICAL, SURGICAL, FAMILY, SOCIAL HX: reviewed    MEDICATIONS/ALLERGIES: reviewed    PHYSICAL EXAM:    VITALS: Vitals reviewed.   Vitals:    07/20/20 0854   BP: 120/67   Pulse: (!) 55   Resp: 17   Temp: 98.7 °F (37.1 °C)   TempSrc: Oral   Weight: 76.4 kg (168 lb 5.1 oz)   Height: 5' 7" (1.702 m)   PainSc:   4   PainLoc: Leg       Physical Exam   Constitutional: She is " oriented to person, place, and time and well-developed, well-nourished, and in no distress.   HENT:   Head: Normocephalic and atraumatic.   Eyes: Pupils are equal, round, and reactive to light. EOM are normal. Right eye exhibits no discharge. Left eye exhibits no discharge.   Neck: Normal range of motion.   Cardiovascular: Normal rate.   Pulmonary/Chest: Effort normal. No respiratory distress.   Abdominal: Soft.   Musculoskeletal:         General: No deformity or edema.   Neurological: She is alert and oriented to person, place, and time. GCS score is 15.   Skin: Skin is warm and dry. No rash noted. She is not diaphoretic.   Psychiatric: Mood, memory, affect and judgment normal.   Nursing note and vitals reviewed.       LUMBAR SPINE:    Lumbar spine: Decrease in ROM with flexion extension and oblique extension with no increased pain.    ((--)) Supine straight leg raise   ((+)) Facet loading - more on right than left  Internal and external rotation of the hip causes no increased pain on either side.  Myofascial exam: Generalized tenderness to palpation across lumbar paraspinous muscles.    ((--)) Milgram's test   ((+)) Greater Trochanter Pain more on right than left  ((--)) Pace Maneuver (Piriformis test)    ((+)) TTP at the SI joint Right > Left  ((--)) MIKEY's test  ((--)) One leg stand    ((--)) Distraction test    Right knee  Inspection: No erythema, swelling, or redness. Scar healed and non-tender  ROM: Decreased  Palpation: TTP over medial > lateral joint space.  Positive crepitus. No patellar tendon tenderness   No pes anserine tenderness. No patellofemoral tendon tenderness.  No instability on exam.    CRANIAL NERVES:  II:  PERRL bilaterally,   III,IV,VI: EOMI.    V:  Facial sensation equal bilaterally  VII:  Facial motor function normal.  VIII:  Hearing equal to finger rub bilaterally  IX/X: Gag normal, palate symmetric  XI:  Shoulder shrug equal, head turn equal  XII:  Tongue midline without  fasciculations      MOTOR: Tone and bulk: normal bilateral upper and lower Strength: normal   Delt Bi Tri WE WF     R 5 5 5 5 5 5   L 5 5 5 5 5 5     IP ADD ABD Quad TA Gas HAM  R 5 5 5 5 5 5 5  L 5 5 5 5 5 5 5      NEUROLOGICAL:    Gen: No clonus or spasticity.   Gait: Normal without antalgic lean. Normal rise, base, steps, and arm swing.    BABINSKI: Downgoing bilaterally. No spasticity or clonus  Sensory: Intact to light touch and proprioception to left side. Right lower leg below knee has a decreased sensation since her knee surgery. No changes and  aware per patient.       ASSESSMENT: Alessia Nelson is a 64 y.o. female with a long history of spinal problems. She has had 2 lumbar surgeries with a fusion to L4 - L5 and has had a cervical surgery as well. She was set to trial a SCS and cancelled due to personal patient concerns. She has a complicated medical history and ongoing problems with bladder dysfunction, constipation, C.Diff with hospitalization, CAD, CHF, DVT, HTN, TIA and thyroid disease. She presents today as an established patient of Dr. Aragon but new to me for the continued management of back pain and hip pain. She reports that currently she feels that her pain is stable on her current regimen, with Lidocaine patches being very effective. Pt remains hesitant with SCS trial and wishes to continue current treatment unless she has a change in the future with pain control. We did discuss her history and previous interventions with benefit. She does not feel that she is ready for another GTB injection today as the patches are providing relief. She is doing Aqua therapy and feels that her exercise increased her pain originally and has now leveled off with the exercises that she is doing. Treatment plan as below:      1. Myofascial pain  methocarbamoL (ROBAXIN) 750 MG Tab   2. Chronic pain disorder  HYDROcodone-acetaminophen (NORCO) 7.5-325 mg per tablet   3. Postlaminectomy syndrome of  lumbar region  HYDROcodone-acetaminophen (NORCO) 7.5-325 mg per tablet   4. Greater trochanteric bursitis of right hip  HYDROcodone-acetaminophen (NORCO) 7.5-325 mg per tablet   5. Sacroiliac joint pain  HYDROcodone-acetaminophen (NORCO) 7.5-325 mg per tablet   6. Status post total right knee replacement  HYDROcodone-acetaminophen (NORCO) 7.5-325 mg per tablet       PLAN:  1. Refill robaxin 750 mg to take TID as needed. She takes this during the day and the Baclofen at night.   2. Refill Norco 7.5-325 mg #60 tablets to take twice daily as needed.  consistent and no suspicions of abuse or misuse.   3. Encouraged the foam roller as previously directed per Dr. Aragon.  4. As long as she is doing well with Aqua therapy will not do a formal PT referral due to cost concern for patient.  5. Continue to see Dr. Briceño for Right knee pain  6. Continue other medications as ordered  7. Can repeat right L5-S3 RFA PRN.  Can also consider diagnostic right sacroiliac joint injection  8. RTC in 2 months for follow up.      Lynne Parsons, CARLA  Pain Management

## 2020-07-20 NOTE — LETTER
July 20, 2020      Cheryl Bradley, ANUM  [address] [place] [address]           [facility]  [address] [facility] [place] [address]  Phone: [phone]  Fax: [phone]          Patient: Alessia Nelson   MR Number: [id number]   YOB: 1955   Date of Visit: 7/20/2020       Dear Cheryl Bradley:    Thank you for referring Alessia Nelson to me for evaluation. Attached you will find relevant portions of my assessment and plan of care.    If you have questions, please do not hesitate to call me. I look forward to following Alessia Nelson along with you.    Sincerely,    Lynne Parsons, ANUM    Enclosure  CC:  No Recipients    If you would like to receive this communication electronically, please contact [email or web address] or [phone] to request more information on [employer] access.    For providers and/or their staff who would like to refer a patient to [facility], please contact us through our one-stop-shop provider referral line, [facility], at [phone].    If you feel you have received this communication in error or would no longer like to receive these types of communications, please e-mail [email or web address]

## 2020-07-30 ENCOUNTER — TELEPHONE (OUTPATIENT)
Dept: GASTROENTEROLOGY | Facility: CLINIC | Age: 65
End: 2020-07-30

## 2020-07-30 NOTE — TELEPHONE ENCOUNTER
----- Message from Maribell Cartagena sent at 7/30/2020  3:15 PM CDT -----  Regarding: appt  Contact: pt  Patient called to cancel  her appt with you she is still waiting for the clinic in [place] to   Call her back to  set up an appt.     Call back [phone]

## 2020-07-30 NOTE — TELEPHONE ENCOUNTER
Returned pt phone call. No answer. NVM. Unable to leave message.    Wanted to tell pt to call NO scheduled to be scheduled with GI provider.

## 2020-08-12 ENCOUNTER — TELEPHONE (OUTPATIENT)
Dept: ENDOSCOPY | Facility: HOSPITAL | Age: 65
End: 2020-08-12

## 2020-08-12 DIAGNOSIS — R93.3 ABNORMAL FINDING ON GI TRACT IMAGING: Primary | ICD-10-CM

## 2020-08-13 ENCOUNTER — TELEPHONE (OUTPATIENT)
Dept: FAMILY MEDICINE | Facility: CLINIC | Age: 65
End: 2020-08-13

## 2020-08-13 DIAGNOSIS — I51.9 HEART DISEASE: ICD-10-CM

## 2020-08-13 DIAGNOSIS — I10 ESSENTIAL HYPERTENSION: Primary | ICD-10-CM

## 2020-08-13 DIAGNOSIS — E78.5 HYPERLIPIDEMIA, UNSPECIFIED HYPERLIPIDEMIA TYPE: ICD-10-CM

## 2020-08-13 NOTE — TELEPHONE ENCOUNTER
Please advise/refer. Thanks.            ----- Message from Moshe Solorio sent at 8/13/2020  9:49 AM CDT -----  Regarding: Referall to Cardiologist  Contact: patient  Patient called in and stated she would like to get a referral to a cardiologist in the [place] area.  Patient call back number is [phone]

## 2020-08-20 DIAGNOSIS — M70.61 GREATER TROCHANTERIC BURSITIS OF RIGHT HIP: ICD-10-CM

## 2020-08-20 DIAGNOSIS — G89.4 CHRONIC PAIN DISORDER: ICD-10-CM

## 2020-08-20 DIAGNOSIS — Z96.651 STATUS POST TOTAL RIGHT KNEE REPLACEMENT: ICD-10-CM

## 2020-08-20 DIAGNOSIS — M53.3 SACROILIAC JOINT PAIN: ICD-10-CM

## 2020-08-20 DIAGNOSIS — M96.1 POSTLAMINECTOMY SYNDROME OF LUMBAR REGION: ICD-10-CM

## 2020-08-20 NOTE — TELEPHONE ENCOUNTER
----- Message from Jesús Marcosd sent at 8/20/2020 10:07 AM CDT -----  Regarding: Rx refill  Type:  RX Refill Request    Who Called:  Ptnt [phone]    Refill or New Rx:  Refill    RX Name and Strength:  HYDROcodone-acetaminophen (NORCO) 7.5-325 mg per tablet 60 tablet 0 7/20/2020  No  Sig - Route: Take 1 tablet by mouth 2 (two) times daily as needed for Pain. - Oral  Sent to pharmacy as: HYDROcodone-acetaminophen (NORCO) 7.5-325 mg per tablet  Class: Normal      How is the patient currently taking it? (ex. 1XDay):  See above    Is this a 30 day or 90 day RX:  See above    Preferred Pharmacy with phone number:      [id number] - [place] [address] AT [address] [facility] [address] [facility] [address]  [address] [address]  Phone: [phone] Fax: [phone]    Local or Mail Order:  Local    AND     Type:  RX Refill Request    RX Name and Strength:  gabapentin (NEURONTIN) 400 MG capsule 360 capsule 3 8/6/2019 7/31/2020 No  Sig - Route: Take 1 capsule (400 mg total) by mouth 4 (four) times daily. - Oral  Sent to pharmacy as: gabapentin (NEURONTIN) 400 MG capsule  Class: Normal    How is the patient currently taking it? (ex. 1XDay):  See above    Is this a 30 day or 90 day RX:  See above    Preferred Pharmacy with phone number:      [facility] [address] [facility]  [address] [facility] [address]  Phone: [phone] Fax: [phone]    Local or Mail Order:  Mail         Ordering Provider:  Lynne Parsons    Best Call Back Number:  Ptnt [phone]      Additional Information:  Advised in short supply on these medications. Please send to pharmacy as soon as possible.

## 2020-08-21 RX ORDER — HYDROCODONE BITARTRATE AND ACETAMINOPHEN 7.5; 325 MG/1; MG/1
1 TABLET ORAL 2 TIMES DAILY PRN
Qty: 60 TABLET | Refills: 0 | Status: SHIPPED | OUTPATIENT
Start: 2020-08-21 | End: 2020-09-21 | Stop reason: SDUPTHER

## 2020-08-28 RX ORDER — CLOPIDOGREL BISULFATE 75 MG/1
TABLET ORAL
Qty: 90 TABLET | Refills: 3 | Status: SHIPPED | OUTPATIENT
Start: 2020-08-28

## 2020-09-02 ENCOUNTER — TELEPHONE (OUTPATIENT)
Dept: FAMILY MEDICINE | Facility: CLINIC | Age: 65
End: 2020-09-02

## 2020-09-02 DIAGNOSIS — I51.9 HEART DISEASE: ICD-10-CM

## 2020-09-02 DIAGNOSIS — I10 ESSENTIAL HYPERTENSION: Primary | ICD-10-CM

## 2020-09-02 DIAGNOSIS — E78.5 HYPERLIPIDEMIA, UNSPECIFIED HYPERLIPIDEMIA TYPE: ICD-10-CM

## 2020-09-02 NOTE — TELEPHONE ENCOUNTER
----- Message from Manpreet Wallace sent at 9/2/2020  2:13 PM CDT -----  Type:  Patient Requesting Referral    Who Called:  Patient  Does the patient already have the specialty appointment scheduled?:  no  If yes, what is the date of that appointment?:    Referral to What Specialty: Cardio  Reason for Referral:  possible congestion heart failure  Does the patient want the referral with a specific physician?:    Is the specialist an [facility]  [facility]  Patient Requesting a Call Back?:  Yes  Best Call Back Number:  [phone]  Additional Information:

## 2020-09-06 ENCOUNTER — TELEPHONE (OUTPATIENT)
Dept: ENDOSCOPY | Facility: HOSPITAL | Age: 65
End: 2020-09-06

## 2020-09-17 ENCOUNTER — TELEPHONE (OUTPATIENT)
Dept: ENDOSCOPY | Facility: HOSPITAL | Age: 65
End: 2020-09-17

## 2020-09-17 NOTE — TELEPHONE ENCOUNTER
----- Message from Luci Correia MA sent at 6/4/2020  2:00 PM CDT -----  Contact: [phone]- Prakash Randall with home health was able to get a urine specimen and pt wants to get labs for a kidney function.    Anesthesia Type: 1% lidocaine with epinephrine

## 2020-09-17 NOTE — TELEPHONE ENCOUNTER
Spoke with patient. EUS scheduled for 11/2 at 11a. Reviewed prep instructions. Ms Nelson expressed understanding.

## 2020-09-21 ENCOUNTER — PATIENT OUTREACH (OUTPATIENT)
Dept: ADMINISTRATIVE | Facility: OTHER | Age: 65
End: 2020-09-21

## 2020-09-21 ENCOUNTER — OFFICE VISIT (OUTPATIENT)
Dept: PAIN MEDICINE | Facility: CLINIC | Age: 65
End: 2020-09-21
Payer: MEDICARE

## 2020-09-21 VITALS
HEIGHT: 67 IN | RESPIRATION RATE: 18 BRPM | DIASTOLIC BLOOD PRESSURE: 71 MMHG | HEART RATE: 57 BPM | WEIGHT: 172.31 LBS | TEMPERATURE: 98 F | BODY MASS INDEX: 27.04 KG/M2 | SYSTOLIC BLOOD PRESSURE: 131 MMHG

## 2020-09-21 DIAGNOSIS — G89.4 CHRONIC PAIN DISORDER: ICD-10-CM

## 2020-09-21 DIAGNOSIS — Z96.651 STATUS POST TOTAL RIGHT KNEE REPLACEMENT: ICD-10-CM

## 2020-09-21 DIAGNOSIS — M53.3 SACROILIAC JOINT PAIN: ICD-10-CM

## 2020-09-21 DIAGNOSIS — M70.61 GREATER TROCHANTERIC BURSITIS OF RIGHT HIP: ICD-10-CM

## 2020-09-21 DIAGNOSIS — M79.18 MYOFASCIAL PAIN: Primary | ICD-10-CM

## 2020-09-21 DIAGNOSIS — M51.36 DDD (DEGENERATIVE DISC DISEASE), LUMBAR: ICD-10-CM

## 2020-09-21 DIAGNOSIS — M43.07 LUMBOSACRAL SPONDYLOLYSIS: ICD-10-CM

## 2020-09-21 DIAGNOSIS — M96.1 POSTLAMINECTOMY SYNDROME OF LUMBAR REGION: ICD-10-CM

## 2020-09-21 DIAGNOSIS — M43.6 TORTICOLLIS: ICD-10-CM

## 2020-09-21 DIAGNOSIS — M62.838 MUSCLE SPASMS OF BOTH LOWER EXTREMITIES: ICD-10-CM

## 2020-09-21 PROCEDURE — 99999 PR PBB SHADOW E&M-EST. PATIENT-LVL V: CPT | Mod: PBBFAC,,, | Performed by: NURSE PRACTITIONER

## 2020-09-21 PROCEDURE — 20553 NJX 1/MLT TRIGGER POINTS 3/>: CPT | Mod: S$GLB,,, | Performed by: NURSE PRACTITIONER

## 2020-09-21 PROCEDURE — 3078F PR MOST RECENT DIASTOLIC BLOOD PRESSURE < 80 MM HG: ICD-10-PCS | Mod: CPTII,S$GLB,, | Performed by: NURSE PRACTITIONER

## 2020-09-21 PROCEDURE — 3075F PR MOST RECENT SYSTOLIC BLOOD PRESS GE 130-139MM HG: ICD-10-PCS | Mod: CPTII,S$GLB,, | Performed by: NURSE PRACTITIONER

## 2020-09-21 PROCEDURE — 3078F DIAST BP <80 MM HG: CPT | Mod: CPTII,S$GLB,, | Performed by: NURSE PRACTITIONER

## 2020-09-21 PROCEDURE — 99214 OFFICE O/P EST MOD 30 MIN: CPT | Mod: 25,S$GLB,, | Performed by: NURSE PRACTITIONER

## 2020-09-21 PROCEDURE — 99999 PR PBB SHADOW E&M-EST. PATIENT-LVL V: ICD-10-PCS | Mod: PBBFAC,,, | Performed by: NURSE PRACTITIONER

## 2020-09-21 PROCEDURE — 20553 PR INJECT TRIGGER POINTS, > 3: ICD-10-PCS | Mod: S$GLB,,, | Performed by: NURSE PRACTITIONER

## 2020-09-21 PROCEDURE — 3008F BODY MASS INDEX DOCD: CPT | Mod: CPTII,S$GLB,, | Performed by: NURSE PRACTITIONER

## 2020-09-21 PROCEDURE — 3075F SYST BP GE 130 - 139MM HG: CPT | Mod: CPTII,S$GLB,, | Performed by: NURSE PRACTITIONER

## 2020-09-21 PROCEDURE — 99214 PR OFFICE/OUTPT VISIT, EST, LEVL IV, 30-39 MIN: ICD-10-PCS | Mod: 25,S$GLB,, | Performed by: NURSE PRACTITIONER

## 2020-09-21 PROCEDURE — 3008F PR BODY MASS INDEX (BMI) DOCUMENTED: ICD-10-PCS | Mod: CPTII,S$GLB,, | Performed by: NURSE PRACTITIONER

## 2020-09-21 RX ORDER — HYDROCODONE BITARTRATE AND ACETAMINOPHEN 7.5; 325 MG/1; MG/1
1 TABLET ORAL 2 TIMES DAILY PRN
Qty: 60 TABLET | Refills: 0 | Status: SHIPPED | OUTPATIENT
Start: 2020-09-21 | End: 2020-10-20 | Stop reason: SDUPTHER

## 2020-09-21 RX ORDER — BACLOFEN 10 MG/1
10 TABLET ORAL 2 TIMES DAILY
Qty: 180 TABLET | Refills: 2 | Status: SHIPPED | OUTPATIENT
Start: 2020-09-21 | End: 2020-10-21 | Stop reason: SDUPTHER

## 2020-09-21 RX ORDER — METHYLPREDNISOLONE ACETATE 40 MG/ML
40 INJECTION, SUSPENSION INTRA-ARTICULAR; INTRALESIONAL; INTRAMUSCULAR; SOFT TISSUE
Status: COMPLETED | OUTPATIENT
Start: 2020-09-21 | End: 2020-09-21

## 2020-09-21 RX ORDER — GABAPENTIN 400 MG/1
400 CAPSULE ORAL 2 TIMES DAILY
Qty: 28 CAPSULE | Refills: 3 | Status: SHIPPED | OUTPATIENT
Start: 2020-09-21 | End: 2020-10-07 | Stop reason: SDUPTHER

## 2020-09-21 RX ADMIN — METHYLPREDNISOLONE ACETATE 40 MG: 40 INJECTION, SUSPENSION INTRA-ARTICULAR; INTRALESIONAL; INTRAMUSCULAR; SOFT TISSUE at 09:09

## 2020-09-21 NOTE — PROGRESS NOTES
FOLLOW UP NOTE:     CHIEF COMPLAINT: f/u low back pain/neck pain    INITIAL HISTORY OF PRESENT ILLNESS: (Per Dr. Aragon)  Alessia Nelson is a 64 y.o. female who presents today with chronic low back pain.  She has a history of 2 lumbar surgeries in the past as well as a cervical surgery.  She reports bilateral low back pain that radiates into her right buttock and the posterior aspect of her right leg to her knee and sometimes to her calf.  She does have numbness in bilateral feet attributed to diabetic peripheral neuropathy. This is separate from her low back symptoms.   This pain is described in detail below.     Of note, she has had 4 falls, one from her BP dropping, one while she was asleep, and two others of unknown causes.     She carries a diagnosis of fibromyalgia, but she states that she does not believe she has this.     Aggravating factors:  Sitting, standing, walking, bending/twisting, lifting, exercise     Mitigating factors:  Rest, lying down, medication     Previously seeing: Dr. Cardozo, Kyler Watson PA-C, Dr. Brent Lewis     Outside records from [facility]:  Office visit from 04/18/2019:  Show ongoing treatment for her low back pain. Makes a note of a recent SI joint injection with no benefit.  Makes note of an EMG which shows neuropathy.  Shows ongoing treatment with Norco 7.5/325 mg twice daily as needed with no issues.  Other office visits from 02/26/2019, 12/20/2018, 10/25/2018 all show stable treatment with Norco 7.5/325 mg twice daily as needed with no issues.  One office visit from 11/2014 show treatment with Norco 10/325 mg with no issues    INTERVAL HISTORY OF PRESENT ILLNESS: Alessia Nelson is a 64 y.o. female with a long history of spinal problems. She has had 2 lumbar surgeries with a fusion to L4 - L5 and has had a cervical surgery as well. She was set to trial a SCS and cancelled due to personal patient concerns. She has a complicated medical history and ongoing  "problems with bladder dysfunction, constipation, C.Diff with hospitalization, CAD, CHF, DVT, HTN, TIA and thyroid disease. She presents today for a f/u of lower back pain and c/o pain to her neck. She was diagnosed with torticollis years ago and she reports that she has been having increased muscular pain to the left side of her neck. She is now unable to completely turn her neck to 90 degrees. She does report tenderness to the scalene and cervical muscles. She does take baclofen and will add methocarbamol occasionally if severe. She reports that her lower back is "OK right now because my neck hurts so bad". She does not have any numbness, tingling or weakness to her arms. No radiation of pain. She has been using ice occasionally without benefit.    Patient denies of any urinary/fecal incontinence, saddle anesthesia, or weakness.       INTERVENTIONAL PAIN HISTORY:  · L4/5 Epidural steroid injection: For many years.  She is unsure of the last one  · Right SI joint: No benefit  · Right L5-S3 MBB: positive  · 06/10/2019:  Right L5-S3 RFA:  50% benefit  · 06/25/2019:  Right greater trochanteric bursa injection:  50% benefit  · Right knee intra-articular injection:  Limited benefit    Relevant Surgeries:   12/18/2019: Right knee Arthroplasty (Dr. Briceño)  · 2006: Lumbar surgery  · 2007: Lumbar surgery  · 2012: Lumbar surgery, PISF  · 2006: Cervical surgery    Anticoagulation: Yes; Plavix    CURRENT PAIN MEDICATIONS:   Baclofen 10mg 1 - 2 times daily  methocarbamol 750 mg 2 - 3 times daily  Tylenol 650 mg 2 times a day as needed  Norco 7.5-325 mg twice daily as needed for pain  Gabapentin 400 mg twice daily (lower dose due to "kidneys")  Lidoderm 5% patches twice daily    :  8/23/2020: Norco 7.5-325 mg #60 tablets  7/20/2020: Norco 7.5-325 mg #60 tablets  6/19/2020: Norco 7.5-325 mg #60 tablets  5/19/2020: Norco 7.5-325 mg #60 tablets  4/14/2020: Norco 7.5-325 mg #60 tablets  3/12/2020: Norco 7.5-325 mg #60 " "tablets  2/8/2020: Norco 7.5-325 mg #60 tablets  1/6/2020: Norco 7.5-325 mg #60 tablets  12/19/2019: Norco 7.5-325 mg #28 tablets  12/2/2019: Norco 7.5-325 mg #60 tablets  10/28/2019: Norco 7.5-325 mg #60 tablets  10/3/2019: Norco 7.5-325 mg #60 tablets  *consistent previous to this date with same dosage*    IMAGING:  No new imaging to review    ROS:  Review of Systems   Constitutional: Negative for chills and fever.   HENT: Negative for sinus pain, sore throat and tinnitus.    Eyes: Negative for visual disturbance.   Respiratory: Negative for shortness of breath and wheezing.    Cardiovascular: Negative for chest pain and palpitations.   Gastrointestinal: Negative for nausea and vomiting.   Genitourinary: Negative for difficulty urinating.   Musculoskeletal: Positive for back pain, myalgias and neck pain.   Skin: Negative for rash.   Allergic/Immunologic: Negative for immunocompromised state.   Neurological: Negative for dizziness and syncope.   Hematological: Does not bruise/bleed easily.   Psychiatric/Behavioral: Negative for self-injury and suicidal ideas.        MEDICAL, SURGICAL, FAMILY, SOCIAL HX: reviewed    MEDICATIONS/ALLERGIES: reviewed    PHYSICAL EXAM:    VITALS: Vitals reviewed.   Vitals:    09/21/20 0845   BP: 131/71   Pulse: (!) 57   Resp: 18   Temp: 98.2 °F (36.8 °C)   TempSrc: Oral   Weight: 78.1 kg (172 lb 4.6 oz)   Height: 5' 7" (1.702 m)   PainSc:   8   PainLoc: Knee       Physical Exam   Constitutional: She is oriented to person, place, and time and well-developed, well-nourished, and in no distress.   HENT:   Head: Normocephalic and atraumatic.   Eyes: Pupils are equal, round, and reactive to light. EOM are normal. Right eye exhibits no discharge. Left eye exhibits no discharge.   Cardiovascular: Normal rate.   Pulmonary/Chest: Effort normal and breath sounds normal. No respiratory distress.   Abdominal: Soft.   Musculoskeletal:      Comments: Tender to the left cervical paraspinous and " trapezius muscles. Very tight on exam   Neurological: She is alert and oriented to person, place, and time. GCS score is 15.   Skin: Skin is warm and dry. No rash noted. She is not diaphoretic.   Psychiatric: Mood, memory, affect and judgment normal.   Nursing note and vitals reviewed.         ASSESSMENT: Alessia Nelson is a 64 y.o. female with a long history of spinal problems. She has had 2 lumbar surgeries with a fusion to L4 - L5 and has had a cervical surgery as well. She was set to trial a SCS and cancelled due to personal patient concerns. She has a complicated medical history and ongoing problems with bladder dysfunction, constipation, C.Diff with hospitalization, CAD, CHF, DVT, HTN, TIA and thyroid disease. She presents today with c/o cervical myofascial pain that is the worst of her pain today. She has limited ROM due to stiffness and pain with lateral movement. We discussed management of this type of pain and she verb understanding. She has not done PT on her neck in the past. She does not have a TENS unit currently. Plan below:     1. Myofascial pain  baclofen (LIORESAL) 10 MG tablet    TENS unit and electrodes Cmpk    Ambulatory referral/consult to Physical/Occupational Therapy    methylPREDNISolone acetate injection 40 mg   2. Chronic pain disorder  gabapentin (NEURONTIN) 400 MG capsule    HYDROcodone-acetaminophen (NORCO) 7.5-325 mg per tablet    Ambulatory referral/consult to Physical/Occupational Therapy   3. Muscle spasms of both lower extremities  baclofen (LIORESAL) 10 MG tablet    Ambulatory referral/consult to Physical/Occupational Therapy   4. Postlaminectomy syndrome of lumbar region  gabapentin (NEURONTIN) 400 MG capsule    HYDROcodone-acetaminophen (NORCO) 7.5-325 mg per tablet    Ambulatory referral/consult to Physical/Occupational Therapy   5. Greater trochanteric bursitis of right hip  HYDROcodone-acetaminophen (NORCO) 7.5-325 mg per tablet    Ambulatory referral/consult to  Physical/Occupational Therapy   6. DDD (degenerative disc disease), lumbar  gabapentin (NEURONTIN) 400 MG capsule    Ambulatory referral/consult to Physical/Occupational Therapy   7. Lumbosacral spondylolysis  gabapentin (NEURONTIN) 400 MG capsule    Ambulatory referral/consult to Physical/Occupational Therapy   8. Sacroiliac joint pain  HYDROcodone-acetaminophen (NORCO) 7.5-325 mg per tablet    Ambulatory referral/consult to Physical/Occupational Therapy   9. Status post total right knee replacement  HYDROcodone-acetaminophen (NORCO) 7.5-325 mg per tablet    Ambulatory referral/consult to Physical/Occupational Therapy   10. Torticollis  Ambulatory referral/consult to Physical/Occupational Therapy    methylPREDNISolone acetate injection 40 mg       PLAN:  1. Trigger point injections in the clinic today   2. Refill Norco 7.5-325 mg PO BID as needed  3. Continue Baclofen 10mg 1 - 2 times daily, methocarbamol 750 mg 2 - 3 times daily as needed, Tylenol 650 mg 2 times a day as needed, and Gabapentin 400 mg twice daily   4. Can repeat right L5-S3 RFA PRN.  Can also consider diagnostic right sacroiliac joint injection       Pt does not need back intervention at this time.   5. Referral to Physical therapy for neck and back   6. Use ice and heat alternately for neck myofascial pain  7. TENS unit ordered  8. I have stressed the importance of physical activity and a home exercise plan to help with chronic pain and improve health.  9. I discussed with the patient potential secondary side effects of medication prescribed and urged the patient to read all FDA approved materials that the pharmacy provides with the prescription.  10. Discussed safety in and around the home to prevent falls and injury. Discussed any environmental changes that can be made to help with safety.     Trigger point injection   Pre-procedure diagnosis: Myofascial trigger points in cervical paraspinous/trapezius muscles  Post-procedure diagnosis:  Same    Timeout performed.  Upon examination, 4 trigger points were noted in the above noted regions.   After the procedure was described and informed consent obtained, the skin over the trigger points was cleaned with isopropyl alcohol. Using a 25-gauge needle, injection of 1ml of 0.25%bupvicaine and 10mg of methylprednisone was injected into each trigger point. The patient noted concordant radiating pain upon injection of her trigger points. The skin was then cleaned and bandages were applied to sites as necessary. Blood loss was <2ml. We observed the patient for 10 minutes after the procedure for any complications, and as there were none noted, the patient was then discharged home with instructions. We advised the patient that during the duration of the local anesthetic effect, this would be the optimal time to perform stretching exercises for the muscles which contain the trigger points. We also advised the patient to continue these exercises daily as this would likely give the best chance of resolution of the trigger points.    Lynne Parsons, CNP  Pain Management

## 2020-09-21 NOTE — PROGRESS NOTES
Health Maintenance Due   Topic Date Due    Mammogram  10/05/1996    Shingles Vaccine (1 of 2) 10/05/2006    Influenza Vaccine (1) 08/01/2020     Updates were requested from care everywhere.  Chart was reviewed for overdue Proactive [facility] Encounters ([facility]FA[facility]) topics (CRS, Breast Cancer Screening, Eye exam)  Health Maintenance has been updated.  LINKS immunization registry triggered.  Immunizations were reconciled.  Mammogram tasked to patient.

## 2020-09-23 ENCOUNTER — OFFICE VISIT (OUTPATIENT)
Dept: CARDIOLOGY | Facility: CLINIC | Age: 65
End: 2020-09-23
Payer: MEDICARE

## 2020-09-23 VITALS
DIASTOLIC BLOOD PRESSURE: 70 MMHG | WEIGHT: 168 LBS | BODY MASS INDEX: 26.37 KG/M2 | HEIGHT: 67 IN | HEART RATE: 60 BPM | OXYGEN SATURATION: 96 % | SYSTOLIC BLOOD PRESSURE: 140 MMHG

## 2020-09-23 DIAGNOSIS — I51.9 HEART DISEASE: ICD-10-CM

## 2020-09-23 DIAGNOSIS — I25.10 ASCVD (ARTERIOSCLEROTIC CARDIOVASCULAR DISEASE): Primary | ICD-10-CM

## 2020-09-23 DIAGNOSIS — E78.5 HYPERLIPIDEMIA, UNSPECIFIED HYPERLIPIDEMIA TYPE: ICD-10-CM

## 2020-09-23 DIAGNOSIS — I10 ESSENTIAL HYPERTENSION: ICD-10-CM

## 2020-09-23 DIAGNOSIS — G47.8 UARS (UPPER AIRWAY RESISTANCE SYNDROME): ICD-10-CM

## 2020-09-23 DIAGNOSIS — I50.9 CONGESTIVE HEART FAILURE, UNSPECIFIED HF CHRONICITY, UNSPECIFIED HEART FAILURE TYPE: ICD-10-CM

## 2020-09-23 PROCEDURE — 3077F SYST BP >= 140 MM HG: CPT | Mod: CPTII,S$GLB,, | Performed by: SPECIALIST

## 2020-09-23 PROCEDURE — 3008F BODY MASS INDEX DOCD: CPT | Mod: CPTII,S$GLB,, | Performed by: SPECIALIST

## 2020-09-23 PROCEDURE — 3008F PR BODY MASS INDEX (BMI) DOCUMENTED: ICD-10-PCS | Mod: CPTII,S$GLB,, | Performed by: SPECIALIST

## 2020-09-23 PROCEDURE — 3078F PR MOST RECENT DIASTOLIC BLOOD PRESSURE < 80 MM HG: ICD-10-PCS | Mod: CPTII,S$GLB,, | Performed by: SPECIALIST

## 2020-09-23 PROCEDURE — 99214 PR OFFICE/OUTPT VISIT, EST, LEVL IV, 30-39 MIN: ICD-10-PCS | Mod: S$GLB,,, | Performed by: SPECIALIST

## 2020-09-23 PROCEDURE — 3077F PR MOST RECENT SYSTOLIC BLOOD PRESSURE >= 140 MM HG: ICD-10-PCS | Mod: CPTII,S$GLB,, | Performed by: SPECIALIST

## 2020-09-23 PROCEDURE — 99214 OFFICE O/P EST MOD 30 MIN: CPT | Mod: S$GLB,,, | Performed by: SPECIALIST

## 2020-09-23 PROCEDURE — 93000 EKG 12-LEAD: ICD-10-PCS | Mod: S$GLB,,, | Performed by: SPECIALIST

## 2020-09-23 PROCEDURE — 3078F DIAST BP <80 MM HG: CPT | Mod: CPTII,S$GLB,, | Performed by: SPECIALIST

## 2020-09-23 PROCEDURE — 93000 ELECTROCARDIOGRAM COMPLETE: CPT | Mod: S$GLB,,, | Performed by: SPECIALIST

## 2020-09-23 NOTE — LETTER
September 23, 2020      Cheryl Bradley, NP  [address]  [place] [address]           [facility]  [address]  [place] [address]  Phone: [phone]  Fax: [phone]          Patient: Alessia Nelson   MR Number: [id number]   YOB: 1955   Date of Visit: 9/23/2020       Dear Cheryl Bradley:    Thank you for referring Alessia Nelson to me for evaluation. Attached you will find relevant portions of my assessment and plan of care.    If you have questions, please do not hesitate to call me. I look forward to following Alessia Nelson along with you.    Sincerely,    Roman Walsh MD    Enclosure  CC:  No Recipients    If you would like to receive this communication electronically, please contact [email or web address] or [phone] to request more information on [employer] access.    For providers and/or their staff who would like to refer a patient to [facility], please contact us through our one-stop-shop provider referral line, [facility], at [phone].    If you feel you have received this communication in error or would no longer like to receive these types of communications, please e-mail [email or web address]

## 2020-09-23 NOTE — PROGRESS NOTES
"Subjective:    Patient ID:  Alessia Nelson is a 64 y.o. female who presents for   Hyperlipidemia, Congestive Heart Failure, Hypotension, Hypertension, and Pre Syncope    Hi bp x years - she cut benazepril to qod    And metop to 1 qd      Hi since 20yo, lo na  Feels bad ( exhausted ) bp  Will be low - bp   Will 200/100 -     Not getting hi   [facility]    st[facility] in  Past no   Cp  she states she may have abdominal aortic aneurysm  Main concern she has is that her blood pressure gets low and is really getting high now  Sounds as though she is scheduled for an ERCP in the future      Review of patient's allergies indicates:   Allergen Reactions    Aspirin      "Makes stomach feel like it's on fire"    Lortab [hydrocodone-acetaminophen] Itching     Able to take generic Norco    Phenergan [promethazine]      SEIZURES    Promethazine hcl        Past Medical History:   Diagnosis Date    Acute pancreatitis     CAD (coronary artery disease)     CHF (congestive heart failure)     COPD (chronic obstructive pulmonary disease)     Depression     Diverticulosis     Encounter for blood transfusion     GERD (gastroesophageal reflux disease)     History of blood clots     1986 AFTER BOWEL RESCETION    History of bowel resection     Hyperlipidemia     Hypertension     Peritonitis     Seizures     HAD A SEIZURE FROM PHENERGAN     Stroke     Thyroid disease     TIA (transient ischemic attack)      Past Surgical History:   Procedure Laterality Date    ADRENAL GLAND SURGERY      APPENDECTOMY      BACK SURGERY      CHOLECYSTECTOMY      COLONOSCOPY      CORONARY STENT PLACEMENT      CYSTOURETHROSCOPY N/A 11/13/2019    Procedure: CYSTOURETHROSCOPY;  Surgeon: Shun Nuñez MD;  Location: [facility];  Service: Urology;  Laterality: N/A;    ENDOSCOPIC ULTRASOUND OF UPPER GASTROINTESTINAL TRACT N/A 11/25/2019    Procedure: ULTRASOUND, UPPER GI TRACT, ENDOSCOPIC;  Surgeon: Alhaji Bridges MD;  Location: [facility]" ENDO (2ND FLR);  Service: Endoscopy;  Laterality: N/A;  5 day hold Plavix, Dr Jovanni Serrano - pg  PM prep    HERNIA REPAIR      HYSTERECTOMY      INCISIONAL HERNIA REPAIR      INJECTION OF ANESTHETIC AGENT AROUND NERVE Right 5/27/2019    Procedure: RIGHT L5-S3 MEDIAL BRANCH BLOCKS;  Surgeon: Sneha Aragon MD;  Location: [facility] OR;  Service: Pain Management;  Laterality: Right;  **DO NOT STOP PLAVIX**    instestine      KNEE ARTHROPLASTY Right 12/18/2019    Procedure: ARTHROPLASTY, KNEE;  Surgeon: Ike Briceño II, MD;  Location: [facility] OR;  Service: Orthopedics;  Laterality: Right;    KNEE SURGERY  1983    PARATHYROID GLAND SURGERY      3 surgeries    RADIOFREQUENCY ABLATION Right 6/10/2019    Procedure: Radiofrequency Ablation - RIGHT L3-5 RADIOFREQUENCY ABLATION WITH FonduYARD COOLIEF THERMAL SYSTEM;  Surgeon: Sneha Aragon MD;  Location: [facility] OR;  Service: Pain Management;  Laterality: Right;  **HOLD PLAVIX x 7 DAYS PRIOR**    UPPER GASTROINTESTINAL ENDOSCOPY       Social History     Tobacco Use    Smoking status: Current Every Day Smoker     Packs/day: 1.00     Years: 46.00     Pack years: 46.00     Types: Cigarettes    Smokeless tobacco: Never Used   Substance Use Topics    Alcohol use: Yes     Comment: RARELY    Drug use: No        Review of Systems     Review of Systems   Constitution: Positive for weight loss.   HENT: Negative.    Cardiovascular: Positive for dyspnea on exertion and orthopnea. Negative for chest pain.        Question of abdominal aortic aneurysm   Respiratory: Positive for shortness of breath. Negative for wheezing.    Endocrine: Negative.         Parathyroidectomy    Skin: Negative.    Musculoskeletal: Positive for arthritis, back pain and muscle weakness.   Gastrointestinal: Positive for diarrhea, heartburn, nausea and vomiting. Negative for jaundice and melena.        Cdiff  On colestipol      Evaluated for pancrease    Genitourinary: Positive for urgency.   Neurological:  Positive for paresthesias.        Tia   Psychiatric/Behavioral: Positive for depression. The patient is nervous/anxious.            Objective:        Vitals:    09/23/20 1706   BP: (!) 140/70   Pulse: 60       Lab Results   Component Value Date    WBC 8.67 07/09/2020    HGB 11.0 (L) 07/09/2020    HCT 34.8 (L) 07/09/2020     07/09/2020    CHOL 89 (L) 07/24/2019    TRIG 205 (H) 07/24/2019    HDL 40 07/24/2019    ALT 9 (L) 07/09/2020    AST 12 07/09/2020     07/09/2020    K 4.1 07/09/2020     07/09/2020    CREATININE 0.8 07/09/2020    BUN 15 07/09/2020    CO2 25 07/09/2020    TSH 8.576 (H) 11/26/2019        ECHOCARDIOGRAM RESULTS  No results found for this or any previous visit.    CURRENT/PREVIOUS VISIT EKG  Results for orders placed or performed during the hospital encounter of 11/13/19   EKG 12-lead    Collection Time: 11/13/19  6:44 AM    Narrative    Test Reason : Z01.818,    Vent. Rate : 056 BPM     Atrial Rate : 056 BPM     P-R Int : 166 ms          QRS Dur : 090 ms      QT Int : 452 ms       P-R-T Axes : 064 008 059 degrees     QTc Int : 436 ms    Sinus bradycardia  Cannot rule out Anterior infarct ,age undetermined  Abnormal ECG  When compared with ECG of 28-JAN-2019 22:18,  No significant change was found  Confirmed by Jovanni Arizmendi MD ([id number]) on 11/13/2019 2:39:29 PM    Referred By: DEBBIE BAEZA           Confirmed By:Jovanni Arizmendi MD     No results found for this or any previous visit.  No results found for this or any previous visit.    PHYSICAL EXAM    Physical Exam   Blood pressure is 150/80 bilateral  Head neck no carotid bruit  Lungs are clear  Cardiac S4  Abdomen no masses question of prominent aortic pulsation  Good femoral pulses    Medication List with Changes/Refills   Current Medications    ACETAMINOPHEN (TYLENOL) 325 MG TABLET    Take 2 tablets (650 mg total) by mouth every 6 (six) hours as needed for Pain (Do not take with any other tylenol containing products).     ALLOPURINOL (ZYLOPRIM) 300 MG TABLET    Take 1 tablet (300 mg total) by mouth once daily.    BACLOFEN (LIORESAL) 10 MG TABLET    Take 1 tablet (10 mg total) by mouth 2 (two) times a day.    BENAZEPRIL (LOTENSIN) 20 MG TABLET    Take 1 tablet (20 mg total) by mouth once daily.    BUPROPION (WELLBUTRIN XL) 150 MG TB24 TABLET    TAKE 1 TABLET EVERY DAY    CLOPIDOGREL (PLAVIX) 75 MG TABLET    TAKE 1 TABLET EVERY DAY    COLESTIPOL (COLESTID) 1 GRAM TAB    Take 2 tablets (2 g total) by mouth once daily.    CYANOCOBALAMIN 1,000 MCG/ML INJECTION    INJECT 1 ML INTO THE MUSCLE EVERY 14 DAYS  (VIAL EXPIRES 28 DAYS AFER OPENING)    DICLOFENAC SODIUM (VOLTAREN) 1 % GEL    Apply 2 g topically 3 (three) times daily.    ESCITALOPRAM OXALATE (LEXAPRO) 20 MG TABLET    TAKE 1 TABLET (20 MG TOTAL) BY MOUTH EVERY EVENING.    GABAPENTIN (NEURONTIN) 400 MG CAPSULE    Take 1 capsule (400 mg total) by mouth 2 (two) times daily.    HYDROCHLOROTHIAZIDE (HYDRODIURIL) 12.5 MG TAB    Take 1 tablet (12.5 mg total) by mouth once daily.    HYDROCODONE-ACETAMINOPHEN (NORCO) 7.5-325 MG PER TABLET    Take 1 tablet by mouth 2 (two) times daily as needed for Pain.    LEVOTHYROXINE (SYNTHROID) 75 MCG TABLET    Take 1 tablet (75 mcg total) by mouth once daily.    LIDOCAINE (LIDODERM) 5 %    Place 2 patches onto the skin once daily. Remove & Discard patch within 12 hours or as directed by MD    MAGNESIUM OXIDE (MAG-OX) 400 MG (241.3 MG MAGNESIUM) TABLET    Take 1 tablet (400 mg total) by mouth 2 (two) times daily.    METHOCARBAMOL (ROBAXIN) 750 MG TAB    Take 1 tablet (750 mg total) by mouth 3 (three) times daily.    METOPROLOL TARTRATE (LOPRESSOR) 100 MG TABLET    TAKE 1 TABLET (100 MG TOTAL) BY MOUTH 2 (TWO) TIMES DAILY.    ONDANSETRON (ZOFRAN) 4 MG TABLET    Take 2 tablets (8 mg total) by mouth every 8 (eight) hours as needed for Nausea.    PANTOPRAZOLE (PROTONIX) 40 MG TABLET    Take 1 tablet (40 mg total) by mouth once daily.    POTASSIUM CHLORIDE SA  (K-DUR,KLOR-CON) 20 MEQ TABLET    TAKE 1 TABLET EVERY DAY    TENS UNIT AND ELECTRODES CMPK    1 Units by Misc.(Non-Drug; Combo Route) route daily as needed.    TRAZODONE (DESYREL) 100 MG TABLET    TAKE 1 TABLET (100 MG TOTAL) BY MOUTH EVERY EVENING.   Discontinued Medications    NICOTINE (NICODERM CQ) 14 MG/24 HR    Place 1 patch onto the skin once daily.    ROSUVASTATIN (CRESTOR) 20 MG TABLET    Take 1 tablet (20 mg total) by mouth once daily.           Assessment:       1. Essential hypertension    2. Heart disease    3. Hyperlipidemia, unspecified hyperlipidemia type     Chronic abdominal pain with multiple GI issues     Plan:   Clear for pancreas evaluation    Stay on the current medicine is are blood pressure is reasonably good an EKG does not show LVH  Set up but nuclear stress test and an echo  Return to clinic 1 month    Problem List Items Addressed This Visit        Cardiac/Vascular    HTN (hypertension), onset in her 20s    Relevant Orders    IN OFFICE EKG 12-LEAD (to [place])    Hyperlipidemia    Relevant Orders    IN OFFICE EKG 12-LEAD (to [facility])      Other Visit Diagnoses     Heart disease        Relevant Orders    IN OFFICE EKG 12-LEAD (to [place])           No follow-ups on file.

## 2020-09-24 ENCOUNTER — PATIENT MESSAGE (OUTPATIENT)
Dept: RESEARCH | Facility: OTHER | Age: 65
End: 2020-09-24

## 2020-09-28 ENCOUNTER — HOSPITAL ENCOUNTER (OUTPATIENT)
Dept: CARDIOLOGY | Facility: CLINIC | Age: 65
Discharge: HOME | End: 2020-09-28
Attending: SPECIALIST
Payer: MEDICARE

## 2020-09-28 ENCOUNTER — HOSPITAL ENCOUNTER (OUTPATIENT)
Dept: RADIOLOGY | Facility: CLINIC | Age: 65
Discharge: HOME | End: 2020-09-28
Attending: SPECIALIST
Payer: MEDICARE

## 2020-09-28 VITALS — BODY MASS INDEX: 26.37 KG/M2 | HEIGHT: 67 IN | WEIGHT: 168 LBS

## 2020-09-28 DIAGNOSIS — I25.10 ASCVD (ARTERIOSCLEROTIC CARDIOVASCULAR DISEASE): ICD-10-CM

## 2020-09-28 DIAGNOSIS — I10 ESSENTIAL HYPERTENSION: ICD-10-CM

## 2020-09-28 PROCEDURE — 78452 HT MUSCLE IMAGE SPECT MULT: CPT | Mod: S$GLB,,, | Performed by: SPECIALIST

## 2020-09-28 PROCEDURE — 93015 STRESS TEST WITH MYOCARDIAL PERFUSION (CUPID ONLY): ICD-10-PCS | Mod: S$GLB,,, | Performed by: SPECIALIST

## 2020-09-28 PROCEDURE — 78452 STRESS TEST WITH MYOCARDIAL PERFUSION (CUPID ONLY): ICD-10-PCS | Mod: S$GLB,,, | Performed by: SPECIALIST

## 2020-09-28 PROCEDURE — A9502 STRESS TEST WITH MYOCARDIAL PERFUSION (CUPID ONLY): ICD-10-PCS | Mod: S$GLB,,, | Performed by: SPECIALIST

## 2020-09-28 PROCEDURE — A9502 TC99M TETROFOSMIN: HCPCS | Mod: S$GLB,,, | Performed by: SPECIALIST

## 2020-09-28 PROCEDURE — 93015 CV STRESS TEST SUPVJ I&R: CPT | Mod: S$GLB,,, | Performed by: SPECIALIST

## 2020-09-30 LAB
CV PHARM DOSE: 0.4 MG
CV STRESS BASE HR: 50 BPM
DIASTOLIC BLOOD PRESSURE: 70 MMHG
NUC STRESS EJECTION FRACTION: 84 %
OHS CV CPX 1 MINUTE RECOVERY HEART RATE: 67 BPM
OHS CV CPX 85 PERCENT MAX PREDICTED HEART RATE MALE: 128
OHS CV CPX MAX PREDICTED HEART RATE: 151
OHS CV CPX PATIENT IS FEMALE: 1
OHS CV CPX PATIENT IS MALE: 0
OHS CV CPX PEAK DIASTOLIC BLOOD PRESSURE: 70 MMHG
OHS CV CPX PEAK HEAR RATE: 72 BPM
OHS CV CPX PEAK RATE PRESSURE PRODUCT: NORMAL
OHS CV CPX PEAK SYSTOLIC BLOOD PRESSURE: 140 MMHG
OHS CV CPX PERCENT MAX PREDICTED HEART RATE ACHIEVED: 48
OHS CV CPX RATE PRESSURE PRODUCT PRESENTING: 5500
STRESS ECHO POST EXERCISE DUR MIN: 3 MINUTES
STRESS ECHO POST EXERCISE DUR SEC: 0 SECONDS
SYSTOLIC BLOOD PRESSURE: 110 MMHG

## 2020-10-01 ENCOUNTER — TELEPHONE (OUTPATIENT)
Dept: PAIN MEDICINE | Facility: CLINIC | Age: 65
End: 2020-10-01

## 2020-10-01 NOTE — TELEPHONE ENCOUNTER
----- Message from Ariadna Cornejo sent at 10/1/2020  2:11 PM CDT -----  Regarding: advise  Contact: Patient/ [phone]  Type: Needs Medical Advice  Who Called:  Patient/ [phone] (home) [phone] (work)      Additional Information: Had injections on 9/21/20. Her neck is still in pain. She cannot remember if office said she can have more injections in two weeks or two months. Please call to advise.

## 2020-10-05 ENCOUNTER — PATIENT MESSAGE (OUTPATIENT)
Dept: ADMINISTRATIVE | Facility: HOSPITAL | Age: 65
End: 2020-10-05

## 2020-10-07 ENCOUNTER — CLINICAL SUPPORT (OUTPATIENT)
Dept: REHABILITATION | Facility: HOSPITAL | Age: 65
End: 2020-10-07
Payer: MEDICARE

## 2020-10-07 ENCOUNTER — OFFICE VISIT (OUTPATIENT)
Dept: FAMILY MEDICINE | Facility: CLINIC | Age: 65
End: 2020-10-07
Payer: MEDICARE

## 2020-10-07 VITALS
SYSTOLIC BLOOD PRESSURE: 137 MMHG | OXYGEN SATURATION: 96 % | WEIGHT: 176 LBS | HEART RATE: 60 BPM | RESPIRATION RATE: 18 BRPM | HEIGHT: 67 IN | DIASTOLIC BLOOD PRESSURE: 89 MMHG | TEMPERATURE: 98 F | BODY MASS INDEX: 27.62 KG/M2

## 2020-10-07 DIAGNOSIS — M43.07 LUMBOSACRAL SPONDYLOLYSIS: ICD-10-CM

## 2020-10-07 DIAGNOSIS — M43.6 TORTICOLLIS: Primary | ICD-10-CM

## 2020-10-07 DIAGNOSIS — Z87.891 PERSONAL HISTORY OF NICOTINE DEPENDENCE: ICD-10-CM

## 2020-10-07 DIAGNOSIS — R30.0 DYSURIA: ICD-10-CM

## 2020-10-07 DIAGNOSIS — M51.36 DDD (DEGENERATIVE DISC DISEASE), LUMBAR: ICD-10-CM

## 2020-10-07 DIAGNOSIS — G89.4 CHRONIC PAIN SYNDROME: ICD-10-CM

## 2020-10-07 DIAGNOSIS — M79.18 MYOFASCIAL PAIN: ICD-10-CM

## 2020-10-07 DIAGNOSIS — F51.01 PRIMARY INSOMNIA: ICD-10-CM

## 2020-10-07 DIAGNOSIS — G89.4 CHRONIC PAIN DISORDER: ICD-10-CM

## 2020-10-07 DIAGNOSIS — M96.1 POSTLAMINECTOMY SYNDROME OF LUMBAR REGION: ICD-10-CM

## 2020-10-07 DIAGNOSIS — E03.9 HYPOTHYROIDISM, UNSPECIFIED TYPE: ICD-10-CM

## 2020-10-07 DIAGNOSIS — Z23 NEEDS FLU SHOT: ICD-10-CM

## 2020-10-07 DIAGNOSIS — Z96.651 STATUS POST TOTAL RIGHT KNEE REPLACEMENT: ICD-10-CM

## 2020-10-07 DIAGNOSIS — M53.3 SACROILIAC JOINT PAIN: ICD-10-CM

## 2020-10-07 DIAGNOSIS — M62.838 MUSCLE SPASMS OF BOTH LOWER EXTREMITIES: ICD-10-CM

## 2020-10-07 DIAGNOSIS — M70.61 GREATER TROCHANTERIC BURSITIS OF RIGHT HIP: ICD-10-CM

## 2020-10-07 DIAGNOSIS — E78.5 HYPERLIPIDEMIA, UNSPECIFIED HYPERLIPIDEMIA TYPE: ICD-10-CM

## 2020-10-07 DIAGNOSIS — I10 ESSENTIAL HYPERTENSION: Primary | ICD-10-CM

## 2020-10-07 DIAGNOSIS — E53.8 B12 DEFICIENCY: ICD-10-CM

## 2020-10-07 DIAGNOSIS — Z12.2 ENCOUNTER FOR SCREENING FOR MALIGNANT NEOPLASM OF RESPIRATORY ORGANS: ICD-10-CM

## 2020-10-07 DIAGNOSIS — F32.89 OTHER DEPRESSION: ICD-10-CM

## 2020-10-07 LAB
BILIRUB UR QL STRIP: ABNORMAL
CLARITY UR: CLEAR
COLOR UR: YELLOW
GLUCOSE UR QL STRIP: NEGATIVE
HGB UR QL STRIP: NEGATIVE
KETONES UR QL STRIP: ABNORMAL
LEUKOCYTE ESTERASE UR QL STRIP: NEGATIVE
NITRITE UR QL STRIP: NEGATIVE
PH UR STRIP: 5 [PH] (ref 5–8)
PROT UR QL STRIP: NEGATIVE
SP GR UR STRIP: >=1.03 (ref 1–1.03)
URN SPEC COLLECT METH UR: ABNORMAL
UROBILINOGEN UR STRIP-ACNC: NEGATIVE EU/DL

## 2020-10-07 PROCEDURE — 99999 PR PBB SHADOW E&M-EST. PATIENT-LVL V: CPT | Mod: PBBFAC,,, | Performed by: NURSE PRACTITIONER

## 2020-10-07 PROCEDURE — 3079F PR MOST RECENT DIASTOLIC BLOOD PRESSURE 80-89 MM HG: ICD-10-PCS | Mod: CPTII,S$GLB,, | Performed by: NURSE PRACTITIONER

## 2020-10-07 PROCEDURE — 3079F DIAST BP 80-89 MM HG: CPT | Mod: CPTII,S$GLB,, | Performed by: NURSE PRACTITIONER

## 2020-10-07 PROCEDURE — 99999 PR PBB SHADOW E&M-EST. PATIENT-LVL V: ICD-10-PCS | Mod: PBBFAC,,, | Performed by: NURSE PRACTITIONER

## 2020-10-07 PROCEDURE — 90686 FLU VACCINE (QUAD) GREATER THAN OR EQUAL TO 3YO PRESERVATIVE FREE IM: ICD-10-PCS | Mod: S$GLB,,, | Performed by: NURSE PRACTITIONER

## 2020-10-07 PROCEDURE — 97161 PT EVAL LOW COMPLEX 20 MIN: CPT | Mod: PN

## 2020-10-07 PROCEDURE — 97140 MANUAL THERAPY 1/> REGIONS: CPT | Mod: PN,59

## 2020-10-07 PROCEDURE — 3008F BODY MASS INDEX DOCD: CPT | Mod: CPTII,S$GLB,, | Performed by: NURSE PRACTITIONER

## 2020-10-07 PROCEDURE — 3075F SYST BP GE 130 - 139MM HG: CPT | Mod: CPTII,S$GLB,, | Performed by: NURSE PRACTITIONER

## 2020-10-07 PROCEDURE — 81003 URINALYSIS AUTO W/O SCOPE: CPT

## 2020-10-07 PROCEDURE — G0008 FLU VACCINE (QUAD) GREATER THAN OR EQUAL TO 3YO PRESERVATIVE FREE IM: ICD-10-PCS | Mod: S$GLB,,, | Performed by: NURSE PRACTITIONER

## 2020-10-07 PROCEDURE — 90686 IIV4 VACC NO PRSV 0.5 ML IM: CPT | Mod: S$GLB,,, | Performed by: NURSE PRACTITIONER

## 2020-10-07 PROCEDURE — G0008 ADMIN INFLUENZA VIRUS VAC: HCPCS | Mod: S$GLB,,, | Performed by: NURSE PRACTITIONER

## 2020-10-07 PROCEDURE — 99214 PR OFFICE/OUTPT VISIT, EST, LEVL IV, 30-39 MIN: ICD-10-PCS | Mod: 25,S$GLB,, | Performed by: NURSE PRACTITIONER

## 2020-10-07 PROCEDURE — 3008F PR BODY MASS INDEX (BMI) DOCUMENTED: ICD-10-PCS | Mod: CPTII,S$GLB,, | Performed by: NURSE PRACTITIONER

## 2020-10-07 PROCEDURE — 3075F PR MOST RECENT SYSTOLIC BLOOD PRESS GE 130-139MM HG: ICD-10-PCS | Mod: CPTII,S$GLB,, | Performed by: NURSE PRACTITIONER

## 2020-10-07 PROCEDURE — 99214 OFFICE O/P EST MOD 30 MIN: CPT | Mod: 25,S$GLB,, | Performed by: NURSE PRACTITIONER

## 2020-10-07 RX ORDER — GABAPENTIN 400 MG/1
400 CAPSULE ORAL 2 TIMES DAILY
Qty: 60 CAPSULE | Refills: 2 | Status: SHIPPED | OUTPATIENT
Start: 2020-10-07 | End: 2020-10-21 | Stop reason: SDUPTHER

## 2020-10-07 NOTE — PLAN OF CARE
[facility]  Physical Therapy Initial Evaluation    Name: Alessia Nelson  Clinic Number: [id number]    Therapy Diagnosis:   Encounter Diagnoses   Name Primary?    Chronic pain disorder     Muscle spasms of both lower extremities     Myofascial pain Yes    Postlaminectomy syndrome of lumbar region     Greater trochanteric bursitis of right hip     DDD (degenerative disc disease), lumbar     Lumbosacral spondylolysis     Sacroiliac joint pain     Status post total right knee replacement     Torticollis      Physician: Lynne Parsons NP    Physician Orders: PT Eval and Treat   Medical Diagnosis: Myofascial Pain; Torticollis   Evaluation Date: 10/7/2020  Authorization period Expiration: 12/31/2020  Plan of Care Certification Period: 12/312020    Visit #: 1/ Visits authorized: 12  Time In: 12:15 pm   Time Out: 12:45 pm   Total Billable Time: 30 minutes    Precautions: Standard    Subjective   Date of onset: several months ago   Date of Surgery: n/a    Past Medical History:   Diagnosis Date    Acute pancreatitis     CAD (coronary artery disease)     CHF (congestive heart failure)     COPD (chronic obstructive pulmonary disease)     Depression     Diverticulosis     Encounter for blood transfusion     GERD (gastroesophageal reflux disease)     History of blood clots     1986 AFTER BOWEL RESCETION    History of bowel resection     Hyperlipidemia     Hypertension     Peritonitis     Seizures     HAD A SEIZURE FROM PHENERGAN     Stroke     Thyroid disease     TIA (transient ischemic attack)      Alessia Nelson  has a past surgical history that includes Parathyroid gland surgery; instestine; Hernia repair; Hysterectomy; Cholecystectomy; Adrenal gland surgery; Colonoscopy; Upper gastrointestinal endoscopy; Incisional hernia repair; Injection of anesthetic agent around nerve (Right, 5/27/2019); Radiofrequency ablation (Right, 6/10/2019); Coronary stent placement;  "Appendectomy; Back surgery; Cystourethroscopy (N/A, 11/13/2019); Endoscopic ultrasound of upper gastrointestinal tract (N/A, 11/25/2019); Knee surgery (1983); and Knee Arthroplasty (Right, 12/18/2019).    Alessia has a current medication list which includes the following prescription(s): acetaminophen, allopurinol, baclofen, benazepril, bupropion, clopidogrel, colestipol, cyanocobalamin, diclofenac sodium, escitalopram oxalate, gabapentin, hydrochlorothiazide, hydrocodone-acetaminophen, levothyroxine, lidocaine, magnesium oxide, methocarbamol, metoprolol tartrate, ondansetron, pantoprazole, potassium chloride sa, tens unit and electrodes, trazodone, escitalopram oxalate, and metoprolol tartrate.    Review of patient's allergies indicates:   Allergen Reactions    Aspirin      "Makes stomach feel like it's on fire"    Lortab [hydrocodone-acetaminophen] Itching     Able to take generic Norco    Phenergan [promethazine]      SEIZURES    Promethazine hcl         Prior Therapy: No physical therapy for current condition; Alessia is well known to this therapist/clinic for treatment in the past - lower back pain; TKA  Social History: Patient lives in a single level home with single step to enter   Occupation: retired   Prior Level of Function: Independent   Current Level of Function: Independent; limited sleep; limited ability to perform daily activities secondary to increased pain     Pain:  Current 9/10, worst 10/10, best 5/10   Location: Cervical/head  left  Description: Throbbing, Grabbing, Tight and Shooting  Aggravating Factors: right now all movement aggrivates her torticollis   Easing Factors: using heat/ice, lidocaine patches;       Onset/BENITEZ: gradual    History of current condition -    Alessia Nelson is a 64 y.o. female with a long history of spinal problems. She has had 2 lumbar surgeries with a fusion to L4 - L5 and has had a cervical surgery as well. She was set to trial a SCS and cancelled due to personal " "patient concerns. She has a complicated medical history and ongoing problems with bladder dysfunction, constipation, C.Diff with hospitalization, CAD, CHF, DVT, HTN, TIA and thyroid disease. She presents today for c/o of lower back pain and c/o pain to her neck. She was diagnosed with torticollis years ago and she reports that she has been having increased muscular pain to the left side of her neck. She is now unable to completely turn her neck to 90 degrees. She does report tenderness to the scalene and cervical muscles. She does take baclofen and will add methocarbamol occasionally if severe. She reports that her lower back is "OK right now because my neck hurts so bad". She does not have any numbness, tingling or weakness to her arms. No radiation of pain. She has been using ice occasionally without benefit.  Alessia feels that her neck needs to be the priority for PT right now.       Pts goals: To relieve neck pain / torticollis         Objective     Observation: Alessia ambulated into clinic; no AD required; holding her neck to assist with managing pain; She is alert/oriented x 4; well known to clinic from previous Physical Therapy treatment.     Posture:  Torticollis Left side - prominent SCM on left;     Cervical Range of Motion:    Degrees Pain   Flexion 25 ++   Extension 30 ++   Right Rotation 42 ++   Left Rotation 50 +   Right Side Bending 20 +   Left Side Bending 15 ++      Shoulder Range of Motion:   Shoulder Left Right   Flexion WFL WFL   Abduction WFL WFL   ER WFL WFL   IR WFL WFL     Strength:   Left Right   DNF Unable to hold > 5 secs secondary to pain     Upper trap 4-/5 4-/5   Mid trap 4-/5 4-/5   Lower trap 4-/5 4-/5   Rhomboids 4-/5 4-/5    WFL      Upper Extremity Strength  (R) UE  (L) UE    Shoulder flexion: 5/5 Shoulder flexion: 5/5   Shoulder Abduction: 5/5 Shoulder abduction: 5/5   Shoulder ER 5/5 Shoulder ER 5/5   Shoulder IR 5/5 Shoulder IR 5/5   Elbow flexion: 5/5 Elbow flexion: 5/5 "   Elbow extension: 5/5 Elbow extension: 5/5   Wrist flexion: 5/5 Wrist flexion: 5/5   Wrist extension: 5/5 Wrist extension: 5/5       Special Tests:   Distraction Positive   Compression Positive   Spurlings Negative   Sharp-Rodolfo Negative   VA test Negative   Lateral Flexion Alar Ligament Negative     Upper Limb Neurodynamic testing:   Right  Left   BPNT Negative  Negative   UNT Negative  Negative   MNT Negative  Negative   RNT Negative  Negative         Joint Mobility: limited in all planes in cervical spine ,    PA's painful; limited ,    Transverse glides painful; limited ,      Thoracic mobility: limited into extension/rotation right and left     Palpation: significant tenderness to palpation at Right SMC - clavicular and sternal head with multiple trigger points noted.     Sensation: intact to light touch     Flexibility:     Upper Trap = R significant restriction, L moderate restriction   Scalenes: R significant restriction, L minimal restriction   SCM: R significant restriction, L minimal restriction   Levator Scap: R significant restriction, L minimal restriction      PT Evaluation Completed? Yes  Discussed Plan of Care with patient: Yes    TREATMENT   Treatment Time In: 12:45 pm   Treatment Time Out: 1:20 pm   Total Treatment time separate from Evaluation: 30 minutes      Alessia received the following manual therapy techniques:   STM - trigger points found with palpation; pressure to create ischemia and release for TP relaxation.   Dry Needling  for 20  minutes.   Patient supine with knee bolster; LF-ES per protocol; 2-point SCM protocol and 1 point into anterior scalene - 1TB above clavicle through SCM clavicular head  Call bell placed in reach of patient . Moist heat to neck after removal of needles.     Home Exercises and Patient Education Provided    Education provided re:   - progress towards goals   - role of therapy in multi - disciplinary team, goals for therapy  Pt educated on condition, POC, and  expectations in therapy.  No spiritual or educational barriers to learning provided    Home exercises:  Pt will be provided HEP during course of treatment with progressions as appropriate. Pt was advised to perform these exercises free of pain, and to stop performing them if pain occurs.   Alessia demonstrated good  understanding of the education provided.       Functional Limitations Reports   Tool: Neck Index   Score: 29/50 58% disability     Assessment   Alessia is a 65 y.o. female referred to outpatient physical therapy with a medical diagnosis of cervicalgia with torticollis, and presents to PT with severe neck pain, decreased cervical AROM, lumbar pain . Patient demonstrates limitations as described in the problem list. Pt will benefit from physcial therapy services in order to maximize pain free and/or functional ROM in cervical spine as well as reduce H/A pain . The following goals were discussed with the patient and patient is in agreement with them as to be addressed in the treatment plan.   Pt prognosis is Good.   Pt will benefit from skilled outpatient Physical Therapy to address the deficits stated above and in the chart below, provide pt/family education, and to maximize pt's level of independence.     Plan of care discussed with patient: Yes  Pt's spiritual, cultural and educational needs considered and pt agreeable to plan of care and goals as stated below:     Anticipated Barriers for therapy: none    Medical necessity is demonstrated by the following IMPAIRMENTS/PROBLEM LIST:    impaired functional mobility, pain, decreased ROM, impaired joint extensibility and impaired muscle length    Low  complexity      Goals     Long Term Goals: 6 weeks  Pain: Decrease pain to no more than 4/10 to allow for improved ability to perform daily activities   Strength: Improve strength in core muscles by 1/2 grade for improved cervical stability  ROM: Improve ROM in cervical spine by 10 degrees in all planes     Functional scale: Improve score on Neck Pain Index  to <40% disability   Postures: Increase sitting and/or standing duration to 30 mins  without pain   Headaches: decrease headache occurrence from daily to no more than 3x/week   Sleep: Improve sleep - able to sleep up to 75% of normal 4 out of 7 days   Exercise: demonstrate independence with home exercise program to maintain gains made in therapy.          Plan   Certification Period: 10/7/2020 to 12/31/2020 .    Outpatient Physical Therapy 2 times weekly for 6 weeks to include the following interventions: patient education, Aquatic Therapy, Manual Therapy, Moist Heat/ Ice, Neuromuscular Re-ed, Patient Education, Therapeutic Exercise and dry needling .   Pt may be seen by PTA as part of the rehabilitation team.     I certify the need for these services furnished under this plan of treatment and while under my care.    Trinh Reyes, PT          Attestation:   I have seen the patient, reviewed the therapist's plan of care, and I agree with the plan of care.   I certify the need for these services furnished under this plan of treatment and while under my care.         _______________            ________                                               _____________________  Physician/Referring Practitioner                                                            Date of Signature

## 2020-10-07 NOTE — PROGRESS NOTES
Subjective:       Patient ID: Alessia Nelson is a 65 y.o. female.    Chief Complaint: Follow-up and Immunizations (wants the flu vac)    Ms. Alessia Nelson is a 65 year old female who presents to the clinic for f/u and flu shot.  In regards to the patient's hypertension, patient denies chest pain/sob, and has been compliant with the medication regimen. hypertension controlled. Goal of <130/80. Meds and labs as per orders. Patient taking benazpril every other day. She has upcoming appt with Dr Walsh. She is complaint .she takes her blood pressure drops at times.  In regards to their hypothyroidism, patient denies poor energy, skin and hair changes, as well as weight gain. Labs due, see ordres.  Complaint without side effects.Patient continues to take allpurinol for gout without any concerns. Denies any recent flare ups. Patient continues to suffer with pain. Takes gabapentin 400 mg BID chronically for neuropathic pain. Reports she is in a lot of pain today. Last saw NP Lynne Parsons, with upcoming appt.  She continues to take wellbutrin and lexapro as ordered. Denies si/hi. Reports medication is effective.    Review of Systems   Constitutional: Positive for fatigue. Negative for activity change, appetite change, chills, fever and unexpected weight change.   HENT: Negative for congestion, ear pain, postnasal drip, sore throat and tinnitus.    Eyes: Negative for pain and visual disturbance.   Respiratory: Negative for apnea, cough, chest tightness, shortness of breath and wheezing.    Cardiovascular: Negative for chest pain, palpitations and leg swelling.   Gastrointestinal: Negative for abdominal distention, abdominal pain, blood in stool, constipation, diarrhea, nausea and vomiting.   Endocrine: Negative for polydipsia and polyuria.   Genitourinary: Negative for difficulty urinating, flank pain, frequency and urgency.   Musculoskeletal: Positive for arthralgias and myalgias. Negative for back pain and joint  "swelling.   Skin: Negative for color change.   Allergic/Immunologic: Negative for environmental allergies and food allergies.   Neurological: Positive for weakness. Negative for dizziness, tremors, syncope, light-headedness and headaches.   Hematological: Does not bruise/bleed easily.   Psychiatric/Behavioral: Negative for agitation, decreased concentration, self-injury, sleep disturbance and suicidal ideas. The patient is not nervous/anxious.          Reviewed family, medical, surgical, and social history.    Objective:      /89 (BP Location: Left arm, Patient Position: Sitting, BP Method: Medium (Automatic))   Pulse 60   Temp 97.8 °F (36.6 °C) (Tympanic)   Resp 18   Ht 5' 7" (1.702 m)   Wt 79.8 kg (176 lb)   SpO2 96%   BMI 27.57 kg/m²   Physical Exam  Vitals signs reviewed.   Constitutional:       General: She is not in acute distress.     Appearance: Normal appearance. She is well-developed. She is not diaphoretic.   HENT:      Head: Normocephalic and atraumatic.      Nose: Nose normal.      Mouth/Throat:      Mouth: Mucous membranes are moist.      Pharynx: Oropharynx is clear. Uvula midline. No uvula swelling.   Eyes:      General: Lids are normal.      Conjunctiva/sclera: Conjunctivae normal.      Pupils: Pupils are equal, round, and reactive to light.   Neck:      Musculoskeletal: Full passive range of motion without pain and normal range of motion. No neck rigidity.      Thyroid: No thyroid mass.      Trachea: Trachea normal. No tracheal tenderness.   Cardiovascular:      Rate and Rhythm: Normal rate and regular rhythm.      Pulses: Normal pulses.      Heart sounds: Normal heart sounds, S1 normal and S2 normal.   Pulmonary:      Effort: Pulmonary effort is normal. No respiratory distress.      Breath sounds: Normal breath sounds. No decreased breath sounds or wheezing.   Abdominal:      General: Bowel sounds are normal. There is no distension or abdominal bruit.      Palpations: Abdomen is soft. "      Tenderness: There is no guarding.   Musculoskeletal:         General: Tenderness present. No deformity.   Lymphadenopathy:      Cervical: No cervical adenopathy.   Skin:     General: Skin is warm and dry.      Capillary Refill: Capillary refill takes less than 2 seconds.      Findings: No abrasion, laceration, lesion or rash.      Nails: There is no clubbing.     Neurological:      Mental Status: She is alert and oriented to person, place, and time.      Motor: No abnormal muscle tone.   Psychiatric:         Mood and Affect: Mood normal.         Speech: Speech normal.         Behavior: Behavior normal. Behavior is cooperative.         Thought Content: Thought content normal.         Judgment: Judgment normal.         Assessment:       1. Essential hypertension    2. Hyperlipidemia, unspecified hyperlipidemia type    3. B12 deficiency    4. Chronic pain syndrome    5. Other depression    6. Primary insomnia    7. Hypothyroidism, unspecified type    8. Chronic pain disorder    9. Postlaminectomy syndrome of lumbar region    10. DDD (degenerative disc disease), lumbar    11. Lumbosacral spondylolysis    12. Dysuria    13. Needs flu shot    14. Encounter for screening for malignant neoplasm of respiratory organs    15. Personal history of nicotine dependence         Plan:       Essential hypertension    Hyperlipidemia, unspecified hyperlipidemia type  -     Lipid Panel; Future; Expected date: 10/07/2020    B12 deficiency    Chronic pain syndrome    Other depression    Primary insomnia    Hypothyroidism, unspecified type  -     TSH; Future; Expected date: 10/07/2020  -     T4; Future; Expected date: 10/07/2020  -     T4, Free; Future; Expected date: 10/07/2020  -      Soft Tissue Head Neck Thyroid; Future; Expected date: 10/07/2020    Chronic pain disorder  -     gabapentin (NEURONTIN) 400 MG capsule; Take 1 capsule (400 mg total) by mouth 2 (two) times daily.  Dispense: 60 capsule; Refill: 2    Postlaminectomy  syndrome of lumbar region  -     gabapentin (NEURONTIN) 400 MG capsule; Take 1 capsule (400 mg total) by mouth 2 (two) times daily.  Dispense: 60 capsule; Refill: 2    DDD (degenerative disc disease), lumbar  -     gabapentin (NEURONTIN) 400 MG capsule; Take 1 capsule (400 mg total) by mouth 2 (two) times daily.  Dispense: 60 capsule; Refill: 2    Lumbosacral spondylolysis  -     gabapentin (NEURONTIN) 400 MG capsule; Take 1 capsule (400 mg total) by mouth 2 (two) times daily.  Dispense: 60 capsule; Refill: 2    Dysuria  -     URINALYSIS    Needs flu shot  -     Influenza - Quadrivalent *Preferred* (6 months+) (PF)    Encounter for screening for malignant neoplasm of respiratory organs  -     CT Chest Lung Screening Low Dose; Future; Expected date: 10/07/2020    Personal history of nicotine dependence   -     CT Chest Lung Screening Low Dose; Future; Expected date: 10/07/2020          PLAN:  - Discussed with patient the plan of care  - Obtain labs  - flu shot given  - obtain US  - Medications reviewed. Medication side effects discussed. Patient has no questions or concerns at this time. Informed patient to notify me regarding any concerns.   - Informed patient to please notify me with any questions or concerns at anytime  - Follow up ordered for 3 months      Risks, benefits, and side effects were discussed with the patient. All questions were answered to the fullest satisfaction of the patient, and pt verbalized understanding and agreement to treatment plan. Pt was to call with any new or worsening symptoms, or present to the ER.

## 2020-10-14 ENCOUNTER — CLINICAL SUPPORT (OUTPATIENT)
Dept: REHABILITATION | Facility: HOSPITAL | Age: 65
End: 2020-10-14
Payer: MEDICARE

## 2020-10-14 DIAGNOSIS — M79.18 CERVICAL MYOFASCIAL PAIN SYNDROME: Primary | ICD-10-CM

## 2020-10-14 PROCEDURE — 97535 SELF CARE MNGMENT TRAINING: CPT | Mod: PN

## 2020-10-14 PROCEDURE — 97140 MANUAL THERAPY 1/> REGIONS: CPT | Mod: PN

## 2020-10-15 ENCOUNTER — HOSPITAL ENCOUNTER (OUTPATIENT)
Dept: RADIOLOGY | Facility: HOSPITAL | Age: 65
Discharge: HOME | End: 2020-10-15
Attending: NURSE PRACTITIONER
Payer: MEDICARE

## 2020-10-15 DIAGNOSIS — Z12.2 ENCOUNTER FOR SCREENING FOR MALIGNANT NEOPLASM OF RESPIRATORY ORGANS: ICD-10-CM

## 2020-10-15 DIAGNOSIS — Z87.891 PERSONAL HISTORY OF NICOTINE DEPENDENCE: ICD-10-CM

## 2020-10-15 DIAGNOSIS — E03.9 HYPOTHYROIDISM, UNSPECIFIED TYPE: ICD-10-CM

## 2020-10-15 PROCEDURE — 76536 US EXAM OF HEAD AND NECK: CPT | Mod: TC,PN

## 2020-10-15 PROCEDURE — G0297 CT CHEST LUNG SCREENING LOW DOSE: ICD-10-PCS | Mod: 26,,, | Performed by: RADIOLOGY

## 2020-10-15 PROCEDURE — G0297 LDCT FOR LUNG CA SCREEN: HCPCS | Mod: 26,,, | Performed by: RADIOLOGY

## 2020-10-15 PROCEDURE — 76536 US SOFT TISSUE HEAD NECK THYROID: ICD-10-PCS | Mod: 26,,, | Performed by: RADIOLOGY

## 2020-10-15 PROCEDURE — 76536 US EXAM OF HEAD AND NECK: CPT | Mod: 26,,, | Performed by: RADIOLOGY

## 2020-10-15 PROCEDURE — G0297 LDCT FOR LUNG CA SCREEN: HCPCS | Mod: TC,PN

## 2020-10-15 NOTE — PROGRESS NOTES
Please let Ms Aggarwal know we will review her CT at her f/u but that her CT lung showed no signs of cancer.

## 2020-10-15 NOTE — PROGRESS NOTES
Please let ms Aggarwal know her US showed small thyroid gland with a few nodules,but none that mean criteria for biopsy. will monitor and f/u

## 2020-10-16 ENCOUNTER — CLINICAL SUPPORT (OUTPATIENT)
Dept: REHABILITATION | Facility: HOSPITAL | Age: 65
End: 2020-10-16
Payer: MEDICARE

## 2020-10-16 DIAGNOSIS — G89.29 CHRONIC BILATERAL LOW BACK PAIN WITHOUT SCIATICA: ICD-10-CM

## 2020-10-16 DIAGNOSIS — M54.50 CHRONIC BILATERAL LOW BACK PAIN WITHOUT SCIATICA: ICD-10-CM

## 2020-10-16 DIAGNOSIS — M79.18 CERVICAL MYOFASCIAL PAIN SYNDROME: Primary | ICD-10-CM

## 2020-10-16 PROCEDURE — 97110 THERAPEUTIC EXERCISES: CPT | Mod: PN

## 2020-10-16 PROCEDURE — 97140 MANUAL THERAPY 1/> REGIONS: CPT | Mod: PN

## 2020-10-16 NOTE — PROGRESS NOTES
Physical Therapy Daily Note     Name: Alessia Nelson  Clinic Number: [id number]  Diagnosis:   Encounter Diagnoses   Name Primary?    Chronic bilateral low back pain without sciatica     Cervical myofascial pain syndrome Yes     Physician: Lynne Parsons NP  Precautions: standard   Visit #: 3 of 12  PTA Visit #: 0  Time In: 12:15 pm   Time Out: 1:15 pm     Subjective     Pt reports: My neck is still sore behind my left ear and down into my shoulder blade;   Pain Scale: Alessia rates pain on a scale of 0-10 to be 4 currently.    Objective     Alessia received individual therapeutic exercises to develop ROM, posture and core stabilization for 15 minutes including:  Serratus Lifts 2# x 20  Pullovers 4# x 15  S/L Left shoulder ER 2# x 15  Supine - PNF diagonal D2 flexion with Left/Right UE using YTB x 10 each     Alessia received the following manual therapy techniques: Joint mobilizations and Soft tissue Mobilization were applied to the: Cervical/ thoracic spine for 25 minutes including:  IASTM to left upper trap, scm, levator, posterior shoulder mm into rhomboids;  Trigger point release Left SCM - grabbing and lifting SCM to locate trigger points then applying pressure to create ischemia with release to affect TrP's.  Cervical distraction with end range hold; Gapping mid-range segments on left /right for greater mobility; Extension of Cervical/thoracic junction as well as upper thoracic spine segments.     The patient received the following direct contact modalities after being cleared for contraindications:     The patient received the following supervised modalities after being cleared for contradictions:     Written Home Exercises Provided:   Refreshed education on TENS unit at home; use of heat/ice for mm relaxation.   Pt demo good understanding of the education provided. Alessia demonstrated good return demonstration of activities.     Education provided  re:  Alessia verbalized good understanding of education provided.   No spiritual or educational barriers to learning provided    Assessmentift     Patient tolerated treatment well today; Alessia has multiple chronic pain issues so trying to take one at a time and see if we can influence some of her lower back issues at the same time. Encouraged her to use her TENS until at home, walk/stretch/exercises at home on daily basis.   This is a 65 y.o. female referred to outpatient physical therapy and presents with a medical diagnosis of Myofascial cervical pain syndrome  and demonstrates limitations as described in the problem list. Pt prognosis is Good. Pt will continue to benefit from skilled outpatient physical therapy to address the deficits listed in the problem list, provide pt/family education and to maximize pt's level of independence in the home and community environment.     Goals as follows:    Long Term Goals: 6 weeks  Pain: Decrease pain to no more than 4/10 to allow for improved ability to perform daily activities   Strength: Improve strength in core muscles by 1/2 grade for improved cervical stability  ROM: Improve ROM in cervical spine by 10 degrees in all planes    Functional scale: Improve score on Neck Pain Index  to <40% disability   Postures: Increase sitting and/or standing duration to 30 mins  without pain   Headaches: decrease headache occurrence from daily to no more than 3x/week   Sleep: Improve sleep - able to sleep up to 75% of normal 4 out of 7 days   Exercise: demonstrate independence with home exercise program to maintain gains made in therapy.          Plan     Continue with established Plan of Care towards PT goals.    Therapist: Trinh Reyes, PT  10/19/2020

## 2020-10-16 NOTE — PROGRESS NOTES
Physical Therapy Daily Note     Name: Alessia Nelson  Clinic Number: [id number]  Diagnosis:   Encounter Diagnosis   Name Primary?    Cervical myofascial pain syndrome Yes     Physician: Lynne Parsons NP  Precautions: standard   Visit #: 2 of 12  PTA Visit #: 0  Time In: 11:30   Time Out: 12:20 pm     Subjective     Pt reports: I really felt better after the first session.  Alessia noting that her neck was not as painful nor was her left leg.   Pain Scale: Alessia rates pain on a scale of 0-10 to be 6 currently.    Objective     Alessia received individual therapeutic exercises to develop  for  minutes including:      Alessia received the following manual therapy techniques: Joint mobilizations and Soft tissue Mobilization were applied to the: cervical/thoracic for 25 m,inutes including:  IASTM to left SCM into anterior/middle and posterior scalene; left levator, bilateral upper traps. Followed by cervical spine mobilization - light distraction; OA flexion; lateral gliding of C1/C2 to right;   Prone - extension of thoracic spine -specifically at C7/T1 and upper t-spine segments.       Patient brought in her TENS unit that she received in the mail; Instructed patient in use of unit - has both E-stim and TENS components; Instructed in use of unit and electrode placement on SCM and or upper traps. Alessia acknowledged understanding - able to demonstrate how to use unit.     The patient received the following direct contact modalities after being cleared for contraindications:     The patient received the following supervised modalities after being cleared for contradictions:     Written Home Exercises Provided:   Upper trap, levator stretch for neck; TENS   Pt demo good understanding of the education provided. Alessia demonstrated good return demonstration of activities.     Education provided re:  Alessia verbalized good understanding of education provided.   No spiritual or  educational barriers to learning provided    Assessment     Patient tolerated treatment well;  Instructed in cervical spine stretching and use of TENS today.   This is a 65 y.o. female referred to outpatient physical therapy and presents with a medical diagnosis of cervical myofascial pain and demonstrates limitations as described in the problem list. Pt prognosis is Good. Pt will continue to benefit from skilled outpatient physical therapy to address the deficits listed in the problem list, provide pt/family education and to maximize pt's level of independence in the home and community environment.     Goals as follows:  Long Term Goals: 6 weeks  Pain: Decrease pain to no more than 4/10 to allow for improved ability to perform daily activities   Strength: Improve strength in core muscles by 1/2 grade for improved cervical stability  ROM: Improve ROM in cervical spine by 10 degrees in all planes    Functional scale: Improve score on Neck Pain Index  to <40% disability   Postures: Increase sitting and/or standing duration to 30 mins  without pain   Headaches: decrease headache occurrence from daily to no more than 3x/week   Sleep: Improve sleep - able to sleep up to 75% of normal 4 out of 7 days   Exercise: demonstrate independence with home exercise program to maintain gains made in therapy.          Plan     Continue with established Plan of Care towards PT goals.    Therapist: Trinh Reyes, PT  10/14/2020

## 2020-10-20 ENCOUNTER — TELEPHONE (OUTPATIENT)
Dept: CARDIOLOGY | Facility: CLINIC | Age: 65
End: 2020-10-20

## 2020-10-20 DIAGNOSIS — M70.61 GREATER TROCHANTERIC BURSITIS OF RIGHT HIP: ICD-10-CM

## 2020-10-20 DIAGNOSIS — Z96.651 STATUS POST TOTAL RIGHT KNEE REPLACEMENT: ICD-10-CM

## 2020-10-20 DIAGNOSIS — M96.1 POSTLAMINECTOMY SYNDROME OF LUMBAR REGION: ICD-10-CM

## 2020-10-20 DIAGNOSIS — R93.3 ABNORMAL FINDING ON GI TRACT IMAGING: ICD-10-CM

## 2020-10-20 DIAGNOSIS — G89.4 CHRONIC PAIN DISORDER: ICD-10-CM

## 2020-10-20 DIAGNOSIS — M53.3 SACROILIAC JOINT PAIN: ICD-10-CM

## 2020-10-20 RX ORDER — HYDROCODONE BITARTRATE AND ACETAMINOPHEN 7.5; 325 MG/1; MG/1
1 TABLET ORAL 2 TIMES DAILY PRN
Qty: 60 TABLET | Refills: 0 | Status: SHIPPED | OUTPATIENT
Start: 2020-10-24 | End: 2020-11-22

## 2020-10-20 RX ORDER — HYDROCODONE BITARTRATE AND ACETAMINOPHEN 7.5; 325 MG/1; MG/1
1 TABLET ORAL 2 TIMES DAILY PRN
Qty: 60 TABLET | Refills: 0 | Status: SHIPPED | OUTPATIENT
Start: 2020-12-21 | End: 2021-01-19

## 2020-10-20 RX ORDER — HYDROCODONE BITARTRATE AND ACETAMINOPHEN 7.5; 325 MG/1; MG/1
1 TABLET ORAL 2 TIMES DAILY PRN
Qty: 60 TABLET | Refills: 0 | Status: SHIPPED | OUTPATIENT
Start: 2020-11-22 | End: 2020-12-11 | Stop reason: SDUPTHER

## 2020-10-20 NOTE — LETTER
2020    Alessia Nelson  [address] [place] [address]             [facility] [place]  [address]  [facility] [address]  Phone: [phone]  Fax: [phone] Patient: Alessia Nelson  : 1955  Referring Doctor: Dr Baca  Type of Procedure: Upper EUS    Current Outpatient Medications   Medication Sig    acetaminophen (TYLENOL) 325 MG tablet Take 2 tablets (650 mg total) by mouth every 6 (six) hours as needed for Pain (Do not take with any other tylenol containing products).    allopurinol (ZYLOPRIM) 300 MG tablet Take 1 tablet (300 mg total) by mouth once daily.    baclofen (LIORESAL) 10 MG tablet Take 1 tablet (10 mg total) by mouth 2 (two) times a day.    benazepril (LOTENSIN) 20 MG tablet Take 1 tablet (20 mg total) by mouth once daily. (Patient taking differently: Take 20 mg by mouth every other day. )    buPROPion (WELLBUTRIN XL) 150 MG TB24 tablet TAKE 1 TABLET EVERY DAY    clopidogreL (PLAVIX) 75 mg tablet TAKE 1 TABLET EVERY DAY    colestipoL (COLESTID) 1 gram Tab Take 2 tablets (2 g total) by mouth once daily.    cyanocobalamin 1,000 mcg/mL injection INJECT 1 ML INTO THE MUSCLE EVERY 14 DAYS  (VIAL EXPIRES 28 DAYS AFER OPENING)    diclofenac sodium (VOLTAREN) 1 % Gel Apply 2 g topically 3 (three) times daily.    gabapentin (NEURONTIN) 400 MG capsule Take 1 capsule (400 mg total) by mouth 2 (two) times daily.    hydroCHLOROthiazide (HYDRODIURIL) 12.5 MG Tab Take 1 tablet (12.5 mg total) by mouth once daily.    [START ON 10/24/2020] HYDROcodone-acetaminophen (NORCO) 7.5-325 mg per tablet Take 1 tablet by mouth 2 (two) times daily as needed for Pain.    [START ON 2020] HYDROcodone-acetaminophen (NORCO) 7.5-325 mg per tablet Take 1 tablet by mouth 2 (two) times daily as needed for Pain.    [START ON 2020] HYDROcodone-acetaminophen (NORCO) 7.5-325 mg per tablet Take 1 tablet by mouth 2 (two) times daily as needed for Pain.     levothyroxine (SYNTHROID) 75 MCG tablet Take 1 tablet (75 mcg total) by mouth once daily.    lidocaine (LIDODERM) 5 % Place 2 patches onto the skin once daily. Remove & Discard patch within 12 hours or as directed by MD    magnesium oxide (MAG-OX) 400 mg (241.3 mg magnesium) tablet Take 1 tablet (400 mg total) by mouth 2 (two) times daily. (Patient taking differently: Take 400 mg by mouth once daily. )    methocarbamoL (ROBAXIN) 750 MG Tab Take 1 tablet (750 mg total) by mouth 3 (three) times daily.    ondansetron (ZOFRAN) 4 MG tablet Take 2 tablets (8 mg total) by mouth every 8 (eight) hours as needed for Nausea.    pantoprazole (PROTONIX) 40 MG tablet TAKE 1 TABLET EVERY DAY    potassium chloride SA (K-DUR,KLOR-CON) 20 MEQ tablet TAKE 1 TABLET EVERY DAY    TENS unit and electrodes Cmpk 1 Units by Misc.(Non-Drug; Combo Route) route daily as needed.    traZODone (DESYREL) 100 MG tablet TAKE 1 TABLET (100 MG TOTAL) BY MOUTH EVERY EVENING.     No current facility-administered medications for this visit.        This patient has been assessed for risk factors for clearance of surgery with the following stipulations:    ___ No contraindications    _x__ Recommendations for antiplatelet/anticoagulant medications:  Ok to stop plavix for 5 days prior    __x_ Cleared for surgery with the following contraindications/precautions:    ___ Not cleared for surgery due to the following reasons:      If you have any questions regarding the above, please contact my office at [phone]    Sincerely, Roman Walsh MD

## 2020-10-21 ENCOUNTER — OFFICE VISIT (OUTPATIENT)
Dept: PAIN MEDICINE | Facility: CLINIC | Age: 65
End: 2020-10-21
Payer: MEDICARE

## 2020-10-21 ENCOUNTER — CLINICAL SUPPORT (OUTPATIENT)
Dept: REHABILITATION | Facility: HOSPITAL | Age: 65
End: 2020-10-21
Payer: MEDICARE

## 2020-10-21 VITALS
BODY MASS INDEX: 27.62 KG/M2 | HEART RATE: 57 BPM | WEIGHT: 176 LBS | SYSTOLIC BLOOD PRESSURE: 99 MMHG | DIASTOLIC BLOOD PRESSURE: 59 MMHG | HEIGHT: 67 IN

## 2020-10-21 DIAGNOSIS — M79.18 MYOFASCIAL PAIN: ICD-10-CM

## 2020-10-21 DIAGNOSIS — M43.07 LUMBOSACRAL SPONDYLOLYSIS: ICD-10-CM

## 2020-10-21 DIAGNOSIS — M54.50 CHRONIC BILATERAL LOW BACK PAIN WITHOUT SCIATICA: ICD-10-CM

## 2020-10-21 DIAGNOSIS — M79.7 FIBROMYALGIA: ICD-10-CM

## 2020-10-21 DIAGNOSIS — G89.4 CHRONIC PAIN DISORDER: ICD-10-CM

## 2020-10-21 DIAGNOSIS — M79.18 CERVICAL MYOFASCIAL PAIN SYNDROME: Primary | ICD-10-CM

## 2020-10-21 DIAGNOSIS — M51.36 DDD (DEGENERATIVE DISC DISEASE), LUMBAR: ICD-10-CM

## 2020-10-21 DIAGNOSIS — M96.1 POSTLAMINECTOMY SYNDROME OF LUMBAR REGION: ICD-10-CM

## 2020-10-21 DIAGNOSIS — G89.29 CHRONIC BILATERAL LOW BACK PAIN WITHOUT SCIATICA: ICD-10-CM

## 2020-10-21 DIAGNOSIS — M62.838 MUSCLE SPASMS OF BOTH LOWER EXTREMITIES: ICD-10-CM

## 2020-10-21 DIAGNOSIS — M47.817 SPONDYLOSIS OF LUMBOSACRAL REGION WITHOUT MYELOPATHY OR RADICULOPATHY: ICD-10-CM

## 2020-10-21 PROCEDURE — 97140 MANUAL THERAPY 1/> REGIONS: CPT | Mod: PN

## 2020-10-21 PROCEDURE — 99214 PR OFFICE/OUTPT VISIT, EST, LEVL IV, 30-39 MIN: ICD-10-PCS | Mod: S$GLB,,, | Performed by: PHYSICIAN ASSISTANT

## 2020-10-21 PROCEDURE — 3074F SYST BP LT 130 MM HG: CPT | Mod: CPTII,S$GLB,, | Performed by: PHYSICIAN ASSISTANT

## 2020-10-21 PROCEDURE — 3008F PR BODY MASS INDEX (BMI) DOCUMENTED: ICD-10-PCS | Mod: CPTII,S$GLB,, | Performed by: PHYSICIAN ASSISTANT

## 2020-10-21 PROCEDURE — 99999 PR PBB SHADOW E&M-EST. PATIENT-LVL V: CPT | Mod: PBBFAC,,, | Performed by: PHYSICIAN ASSISTANT

## 2020-10-21 PROCEDURE — 99214 OFFICE O/P EST MOD 30 MIN: CPT | Mod: S$GLB,,, | Performed by: PHYSICIAN ASSISTANT

## 2020-10-21 PROCEDURE — 3074F PR MOST RECENT SYSTOLIC BLOOD PRESSURE < 130 MM HG: ICD-10-PCS | Mod: CPTII,S$GLB,, | Performed by: PHYSICIAN ASSISTANT

## 2020-10-21 PROCEDURE — 3008F BODY MASS INDEX DOCD: CPT | Mod: CPTII,S$GLB,, | Performed by: PHYSICIAN ASSISTANT

## 2020-10-21 PROCEDURE — 3078F DIAST BP <80 MM HG: CPT | Mod: CPTII,S$GLB,, | Performed by: PHYSICIAN ASSISTANT

## 2020-10-21 PROCEDURE — 97110 THERAPEUTIC EXERCISES: CPT | Mod: PN

## 2020-10-21 PROCEDURE — 3078F PR MOST RECENT DIASTOLIC BLOOD PRESSURE < 80 MM HG: ICD-10-PCS | Mod: CPTII,S$GLB,, | Performed by: PHYSICIAN ASSISTANT

## 2020-10-21 PROCEDURE — 99999 PR PBB SHADOW E&M-EST. PATIENT-LVL V: ICD-10-PCS | Mod: PBBFAC,,, | Performed by: PHYSICIAN ASSISTANT

## 2020-10-21 RX ORDER — BACLOFEN 10 MG/1
10 TABLET ORAL 2 TIMES DAILY
Qty: 60 TABLET | Refills: 0 | Status: SHIPPED | OUTPATIENT
Start: 2020-10-21 | End: 2020-10-21 | Stop reason: SDUPTHER

## 2020-10-21 RX ORDER — GABAPENTIN 400 MG/1
400 CAPSULE ORAL 2 TIMES DAILY
Qty: 180 CAPSULE | Refills: 1 | Status: SHIPPED | OUTPATIENT
Start: 2020-10-21 | End: 2021-05-05 | Stop reason: SDUPTHER

## 2020-10-21 RX ORDER — BACLOFEN 10 MG/1
10 TABLET ORAL 2 TIMES DAILY
Qty: 180 TABLET | Refills: 1 | Status: SHIPPED | OUTPATIENT
Start: 2020-10-21 | End: 2021-02-04 | Stop reason: SDUPTHER

## 2020-10-21 NOTE — PROGRESS NOTES
Physical Therapy Daily Note     Name: Alessia Nelson  Clinic Number: [id number]  Diagnosis:   Encounter Diagnoses   Name Primary?    Cervical myofascial pain syndrome Yes    Spondylosis of lumbosacral region without myelopathy or radiculopathy     Fibromyalgia     Chronic bilateral low back pain without sciatica      Physician: Lynne Parsons NP  Precautions: standard   Visit #: 4of 12  PTA Visit #: 0  Time In: 12:15 pm   Time Out: 1:15 pm     Subjective     Pt reports: My neck is still sore behind my left ear and down into my shoulder blade; Alessia saw Julianna Valdivia this morning, brought in new orders for lumbar spine.   Pain Scale: Alessia rates pain on a scale of 0-10 to be 6-7currently.    Objective     Alessia received individual therapeutic exercises to develop ROM, posture and core stabilization for 20 minutes including:  Serratus Lifts 2# x 20  Pullovers 4# x 15  S/L Left shoulder ER 2# x 15  Supine - PNF diagonal D2 flexion with Left/Right UE using YTB x 10 each  Thoracic rotation in side lying   Bridging   Piriformis stretch  Lumbar rolls   SLR  Seated Lumbar flexion with SB       Alessia received the following manual therapy techniques: Joint mobilizations and Soft tissue Mobilization were applied to the: Cervical/ thoracic spine for 20 minutes including:  IASTM to left upper trap, scm, levator, posterior shoulder mm into rhomboids;  Trigger point release Left SCM - grabbing and lifting SCM to locate trigger points then applying pressure to create ischemia with release to affect TrP's.; S/L flexion to right / left lumbar spine; paraspinal skin rolling     The patient received the following direct contact modalities after being cleared for contraindications:     The patient received the following supervised modalities after being cleared for contradictions:     Written Home Exercises Provided:   Refreshed education on TENS unit at home; use of  heat/ice for mm relaxation.   Pt demo good understanding of the education provided. Alessia demonstrated good return demonstration of activities.     Education provided re:  Alessia verbalized good understanding of education provided.   No spiritual or educational barriers to learning provided    Assessmentift     Patient tolerated treatment well today; Alessia has multiple chronic pain issues so trying to take one at a time and see if we can influence some of her lower back issues at the same time. Encouraged her to use her TENS until at home, walk/stretch/exercises at home on daily basis.   This is a 65 y.o. female referred to outpatient physical therapy and presents with a medical diagnosis of Myofascial cervical pain syndrome  and demonstrates limitations as described in the problem list. Pt prognosis is Good. Pt will continue to benefit from skilled outpatient physical therapy to address the deficits listed in the problem list, provide pt/family education and to maximize pt's level of independence in the home and community environment.     Goals as follows:    Long Term Goals: 6 weeks  Pain: Decrease pain to no more than 4/10 to allow for improved ability to perform daily activities   Strength: Improve strength in core muscles by 1/2 grade for improved cervical stability  ROM: Improve ROM in cervical spine by 10 degrees in all planes    Functional scale: Improve score on Neck Pain Index  to <40% disability   Postures: Increase sitting and/or standing duration to 30 mins  without pain   Headaches: decrease headache occurrence from daily to no more than 3x/week   Sleep: Improve sleep - able to sleep up to 75% of normal 4 out of 7 days   Exercise: demonstrate independence with home exercise program to maintain gains made in therapy.          Plan     Continue with established Plan of Care towards PT goals.    Therapist: Trinh Reyes, PT  10/21/2020

## 2020-10-21 NOTE — PROGRESS NOTES
FOLLOW UP NOTE:     CHIEF COMPLAINT: f/u low back pain/neck pain    INITIAL HISTORY OF PRESENT ILLNESS: (Per Dr. Aragon)  Alessia Nelson is a 64 y.o. female who presents today with chronic low back pain.  She has a history of 2 lumbar surgeries in the past as well as a cervical surgery.  She reports bilateral low back pain that radiates into her right buttock and the posterior aspect of her right leg to her knee and sometimes to her calf.  She does have numbness in bilateral feet attributed to diabetic peripheral neuropathy. This is separate from her low back symptoms.   This pain is described in detail below.     Of note, she has had 4 falls, one from her BP dropping, one while she was asleep, and two others of unknown causes.     She carries a diagnosis of fibromyalgia, but she states that she does not believe she has this.     Aggravating factors:  Sitting, standing, walking, bending/twisting, lifting, exercise     Mitigating factors:  Rest, lying down, medication     Previously seeing: Dr. Cardozo, Kyler Watson PA-C, Dr. Brent Lewis     Outside records from [facility]:  Office visit from 04/18/2019:  Show ongoing treatment for her low back pain. Makes a note of a recent SI joint injection with no benefit.  Makes note of an EMG which shows neuropathy.  Shows ongoing treatment with Norco 7.5/325 mg twice daily as needed with no issues.  Other office visits from 02/26/2019, 12/20/2018, 10/25/2018 all show stable treatment with Norco 7.5/325 mg twice daily as needed with no issues.  One office visit from 11/2014 show treatment with Norco 10/325 mg with no issues    INTERVAL HISTORY OF PRESENT ILLNESS: Alessia Nelson is a 65 y.o. female with a long history of spinal problems. She has had 2 lumbar surgeries with a fusion to L4 - L5 and has had a cervical surgery as well. She was set to trial a SCS and cancelled due to personal patient concerns. She has a complicated medical history and ongoing  "problems with bladder dysfunction, constipation, C.Diff with hospitalization, CAD, CHF, DVT, HTN, TIA and thyroid disease. She presents today for a f/u of lower back pain and c/o pain to her neck. She was diagnosed with torticollis years ago. TPI was beneficial LCV. She is now unable to completely turn her neck to 90 degrees. She does report tenderness to the scalene and cervical muscles. She does take baclofen and will add methocarbamol occasionally if severe. She reports that her lower back is more painful on the right. She does not have any numbness, tingling or weakness to her extremities. No radiation of pain. She has been using ice occasionally without benefit.    Patient denies of any urinary/fecal incontinence, saddle anesthesia, or weakness. She is currently in PT for her neck but is interested in PT for her back.   Pt has been seen in the clinic before, however pt is new to me.     History below per Dr. Carroll      INTERVENTIONAL PAIN HISTORY:  · L4/5 Epidural steroid injection: For many years.  She is unsure of the last one  · Right SI joint: No benefit  · Right L5-S3 MBB: positive  · 06/10/2019:  Right L5-S3 RFA:  50% benefit  · 06/25/2019:  Right greater trochanteric bursa injection:  50% benefit  · Right knee intra-articular injection:  Limited benefit    Relevant Surgeries:   12/18/2019: Right knee Arthroplasty (Dr. Briceño)  · 2006: Lumbar surgery  · 2007: Lumbar surgery  · 2012: Lumbar surgery, PISF  · 2006: Cervical surgery    Anticoagulation: Yes; Plavix    CURRENT PAIN MEDICATIONS:   Baclofen 10mg 1 - 2 times daily  methocarbamol 750 mg 2 - 3 times daily  Tylenol 650 mg 2 times a day as needed  Norco 7.5-325 mg twice daily as needed for pain  Gabapentin 400 mg twice daily (lower dose due to "kidneys")  Lidoderm 5% patches twice daily    :  8/23/2020: Norco 7.5-325 mg #60 tablets  7/20/2020: Norco 7.5-325 mg #60 tablets  6/19/2020: Norco 7.5-325 mg #60 tablets  5/19/2020: Norco 7.5-325 mg #60 " "tablets  4/14/2020: Norco 7.5-325 mg #60 tablets  3/12/2020: Norco 7.5-325 mg #60 tablets  2/8/2020: Norco 7.5-325 mg #60 tablets  1/6/2020: Norco 7.5-325 mg #60 tablets  12/19/2019: Norco 7.5-325 mg #28 tablets  12/2/2019: Norco 7.5-325 mg #60 tablets  10/28/2019: Norco 7.5-325 mg #60 tablets  10/3/2019: Norco 7.5-325 mg #60 tablets  *consistent previous to this date with same dosage*    IMAGING:  No new imaging to review    ROS:  Review of Systems   Constitutional: Negative for chills and fever.   HENT: Negative for sinus pain, sore throat and tinnitus.    Eyes: Negative for visual disturbance.   Respiratory: Negative for shortness of breath and wheezing.    Cardiovascular: Negative for chest pain and palpitations.   Gastrointestinal: Negative for nausea and vomiting.   Genitourinary: Negative for difficulty urinating.   Musculoskeletal: Positive for back pain, myalgias and neck pain.   Skin: Negative for rash.   Allergic/Immunologic: Negative for immunocompromised state.   Neurological: Negative for dizziness and syncope.   Hematological: Does not bruise/bleed easily.   Psychiatric/Behavioral: Negative for self-injury and suicidal ideas.        MEDICAL, SURGICAL, FAMILY, SOCIAL HX: reviewed    MEDICATIONS/ALLERGIES: reviewed    PHYSICAL EXAM:    VITALS: Vitals reviewed.   Vitals:    10/21/20 0943   BP: (!) 99/59   Pulse: (!) 57   Weight: 79.8 kg (176 lb)   Height: 5' 7" (1.702 m)   PainSc:   9   PainLoc: Back       Physical Exam   Constitutional: She is oriented to person, place, and time and well-developed, well-nourished, and in no distress.   HENT:   Head: Normocephalic and atraumatic.   Eyes: Pupils are equal, round, and reactive to light. EOM are normal. Right eye exhibits no discharge. Left eye exhibits no discharge.   Cardiovascular: Normal rate.   Pulmonary/Chest: Effort normal and breath sounds normal. No respiratory distress.   Abdominal: Soft.   Musculoskeletal:      Comments: Tender to the left " cervical paraspinous and trapezius muscles. Very tight on exam   Neurological: She is alert and oriented to person, place, and time. GCS score is 15.   Skin: Skin is warm and dry. No rash noted. She is not diaphoretic.   Psychiatric: Mood, memory, affect and judgment normal.   Nursing note and vitals reviewed.         ASSESSMENT: Alessia Nelson is a 65 y.o. female with a long history of spinal problems. She has had 2 lumbar surgeries with a fusion to L4 - L5 and has had a cervical surgery as well. She was set to trial a SCS and cancelled due to personal patient concerns. She has a complicated medical history and ongoing problems with bladder dysfunction, constipation, C.Diff with hospitalization, CAD, CHF, DVT, HTN, TIA and thyroid disease. She presents today with c/o cervical myofascial pain that is the worst of her pain today. She has limited ROM due to stiffness and pain with lateral movement. We discussed management of this type of pain and she verb understanding. She has not done PT on her neck in the past. She does not have a TENS unit currently. Plan below:     1. Muscle spasms of both lower extremities  baclofen (LIORESAL) 10 MG tablet    DISCONTINUED: baclofen (LIORESAL) 10 MG tablet   2. Myofascial pain  baclofen (LIORESAL) 10 MG tablet    Ambulatory referral/consult to Physical/Occupational Therapy    DISCONTINUED: baclofen (LIORESAL) 10 MG tablet   3. Chronic pain disorder  gabapentin (NEURONTIN) 400 MG capsule   4. Postlaminectomy syndrome of lumbar region  gabapentin (NEURONTIN) 400 MG capsule   5. DDD (degenerative disc disease), lumbar  gabapentin (NEURONTIN) 400 MG capsule    Ambulatory referral/consult to Physical/Occupational Therapy   6. Lumbosacral spondylolysis  gabapentin (NEURONTIN) 400 MG capsule    Ambulatory referral/consult to Physical/Occupational Therapy       PLAN:  1.  I have stressed the importance of physical activity and a home exercise plan to help with pain and improve overall  health.  2. Refill Norco 7.5-325 mg PO BID as needed.  reviewed  3.Baclofen 10mg 1 - 2 times daily Gabapentin 400 mg twice daily   4. Can repeat right L5-S3 RFA PRN.        Pt does not need back intervention at this time.   5. Referral to Physical therapy for neck and back   6. Use ice and heat alternately for neck myofascial pain  7. Consider lumbar TPI when she gets her next cervical TPI.

## 2020-10-22 LAB
AORTIC VALVE CUSP SEPERATION: 2 CM
AV INDEX (PROSTH): 0.93
AV MEAN GRADIENT: 4 MMHG
AV PEAK GRADIENT: 10 MMHG
AV VALVE AREA: 2.92 CM2
AV VELOCITY RATIO: 0.81
BSA FOR ECHO PROCEDURE: 1.9 M2
CV ECHO LV RWT: 0.5 CM
DOP CALC AO PEAK VEL: 1.6 M/S
DOP CALC AO VTI: 33.9 CM
DOP CALC LVOT AREA: 3.1 CM2
DOP CALC LVOT DIAMETER: 2 CM
DOP CALC LVOT PEAK VEL: 1.3 M/S
DOP CALC LVOT STROKE VOLUME: 98.91 CM3
DOP CALCLVOT PEAK VEL VTI: 31.5 CM
E WAVE DECELERATION TIME: 264 MSEC
E/A RATIO: 0.85
E/E' RATIO: 0.11 M/S
ECHO LV POSTERIOR WALL: 1.04 CM (ref 0.6–1.1)
FRACTIONAL SHORTENING: 34 % (ref 28–44)
INTERVENTRICULAR SEPTUM: 1.02 CM (ref 0.6–1.1)
IVRT: 100 MSEC
LEFT ATRIUM SIZE: 4.1 CM
LEFT INTERNAL DIMENSION IN SYSTOLE: 2.73 CM (ref 2.1–4)
LEFT VENTRICLE DIASTOLIC VOLUME INDEX: 40.69 ML/M2
LEFT VENTRICLE DIASTOLIC VOLUME: 76.4 ML
LEFT VENTRICLE MASS INDEX: 75 G/M2
LEFT VENTRICLE SYSTOLIC VOLUME INDEX: 14.8 ML/M2
LEFT VENTRICLE SYSTOLIC VOLUME: 27.8 ML
LEFT VENTRICULAR INTERNAL DIMENSION IN DIASTOLE: 4.15 CM (ref 3.5–6)
LEFT VENTRICULAR MASS: 140.38 G
LV LATERAL E/E' RATIO: 0.1 M/S
LV SEPTAL E/E' RATIO: 0.13 M/S
MV PEAK A VEL: 1.04 M/S
MV PEAK E VEL: 0.88 M/S
MV STENOSIS PRESSURE HALF TIME: 60 MS
MV VALVE AREA P 1/2 METHOD: 3.67 CM2
PISA TR MAX VEL: 1.86 M/S
RA PRESSURE: 3 MMHG
RIGHT VENTRICULAR END-DIASTOLIC DIMENSION: 3.28 CM
TDI LATERAL: 9.1 M/S
TDI SEPTAL: 6.91 M/S
TDI: 8.01 M/S
TR MAX PG: 14 MMHG
TV REST PULMONARY ARTERY PRESSURE: 17 MMHG

## 2020-10-23 ENCOUNTER — TELEPHONE (OUTPATIENT)
Dept: ENDOSCOPY | Facility: HOSPITAL | Age: 65
End: 2020-10-23

## 2020-10-23 ENCOUNTER — CLINICAL SUPPORT (OUTPATIENT)
Dept: REHABILITATION | Facility: HOSPITAL | Age: 65
End: 2020-10-23
Payer: MEDICARE

## 2020-10-23 DIAGNOSIS — M79.7 FIBROMYALGIA: ICD-10-CM

## 2020-10-23 DIAGNOSIS — M79.18 CERVICAL MYOFASCIAL PAIN SYNDROME: Primary | ICD-10-CM

## 2020-10-23 PROCEDURE — 97140 MANUAL THERAPY 1/> REGIONS: CPT | Mod: PN

## 2020-10-23 NOTE — PROGRESS NOTES
Physical Therapy Daily Note     Name: Alessia Nelson  Clinic Number: [id number]  Diagnosis:   No diagnosis found.  Physician: Lynne Parsons NP  Precautions: standard   Visit #:  5 of 12  PTA Visit #: 0  Time In: 12:15 pm   Time Out: 1:20 pm      Subjective     Pt reports: My neck is better, but still sore; lower back muscles tight   Pain Scale: Alessia rates pain on a scale of 0-10 to be 6-7currently.    Objective     Alessia received individual therapeutic exercises to develop ROM, posture and core stabilization for 20 minutes including:    Aquatic Exercise independently today     Serratus Lifts 2# x 20  Pullovers 4# x 15  S/L Left shoulder ER 2# x 15  Supine - PNF diagonal D2 flexion with Left/Right UE using YTB x 10 each  Thoracic rotation in side lying   Bridging   Piriformis stretch  Lumbar rolls   SLR  Seated Lumbar flexion with SB       Alessia received the following manual therapy techniques: Joint mobilizations and Soft tissue Mobilization were applied to the: Cervical/ thoracic spine for 20 minutes including:  IASTM to left upper trap, scm, levator, posterior shoulder mm into rhomboids;  Trigger point release Left SCM - grabbing and lifting SCM to locate trigger points then applying pressure to create ischemia with release to affect TrP's.; S/L flexion to right / left lumbar spine; paraspinal skin rolling     The patient received the following direct contact modalities after being cleared for contraindications:     The patient received the following supervised modalities after being cleared for contradictions:     Written Home Exercises Provided:   Refreshed education on TENS unit at home; use of heat/ice for mm relaxation.   Pt demo good understanding of the education provided. Alessia demonstrated good return demonstration of activities.     Education provided re:  Alessia verbalized good understanding of education provided.   No spiritual or educational  barriers to learning provided    Assessmentift     Patient tolerated treatment well today; Alessia has multiple chronic pain issues so trying to take one at a time and see if we can influence some of her lower back issues at the same time. Encouraged her to use her TENS until at home, walk/stretch/exercises at home on daily basis.   This is a 65 y.o. female referred to outpatient physical therapy and presents with a medical diagnosis of Myofascial cervical pain syndrome  and demonstrates limitations as described in the problem list. Pt prognosis is Good. Pt will continue to benefit from skilled outpatient physical therapy to address the deficits listed in the problem list, provide pt/family education and to maximize pt's level of independence in the home and community environment.     Goals as follows:    Long Term Goals: 6 weeks  Pain: Decrease pain to no more than 4/10 to allow for improved ability to perform daily activities   Strength: Improve strength in core muscles by 1/2 grade for improved cervical stability  ROM: Improve ROM in cervical spine by 10 degrees in all planes    Functional scale: Improve score on Neck Pain Index  to <40% disability   Postures: Increase sitting and/or standing duration to 30 mins  without pain   Headaches: decrease headache occurrence from daily to no more than 3x/week   Sleep: Improve sleep - able to sleep up to 75% of normal 4 out of 7 days   Exercise: demonstrate independence with home exercise program to maintain gains made in therapy.          Plan     Continue with established Plan of Care towards PT goals.    Therapist: Trinh Reyes, PT  10/23/2020

## 2020-10-26 ENCOUNTER — TELEPHONE (OUTPATIENT)
Dept: ENDOSCOPY | Facility: HOSPITAL | Age: 65
End: 2020-10-26

## 2020-10-27 ENCOUNTER — TELEPHONE (OUTPATIENT)
Dept: ENDOSCOPY | Facility: HOSPITAL | Age: 65
End: 2020-10-27

## 2020-10-27 ENCOUNTER — CLINICAL SUPPORT (OUTPATIENT)
Dept: REHABILITATION | Facility: HOSPITAL | Age: 65
End: 2020-10-27
Payer: MEDICARE

## 2020-10-27 DIAGNOSIS — M79.18 CERVICAL MYOFASCIAL PAIN SYNDROME: Primary | ICD-10-CM

## 2020-10-27 PROCEDURE — 97110 THERAPEUTIC EXERCISES: CPT | Mod: PN

## 2020-10-27 PROCEDURE — 97140 MANUAL THERAPY 1/> REGIONS: CPT | Mod: PN

## 2020-10-27 NOTE — TELEPHONE ENCOUNTER
Spoke with patient about instructions for UEUS scheduled 11/2/20 at 0930 (new time) and covid-19 test 10/30/20 at 1340 at [place].  She will hold Plavix for 5 days prior to procedure with permission from Dr Roman Walsh (she has clinic appt w/him tomorrow afternoon).

## 2020-10-27 NOTE — PROGRESS NOTES
Physical Therapy Daily Note     Name: Alessia Nelson  Clinic Number: [id number]  Diagnosis:   Encounter Diagnosis   Name Primary?    Cervical myofascial pain syndrome Yes     Physician: Lynne Parsons NP  Precautions: standard   Visit #:  6 of 12  PTA Visit #: 0  Time In: 12:15 pm   Time Out: 1:15 pm      Subjective     Pt reports: My neck is still sore - traveling down into her scapula and back of left arm.   Pain Scale: Alessia rates pain on a scale of 0-10 to be 6-7currently.    Objective     Alessia received individual therapeutic exercises to develop ROM, posture and core stabilization for 20 minutes including:    Aquatic Exercise independently today     Serratus Lifts 2# x 20  Pullovers 4# x 15  S/L Left shoulder ER 2# x 15  Supine - PNF diagonal D2 flexion with Left/Right UE using YTB x 10 each  Thoracic rotation in side lying   Bridging   Piriformis stretch  Lumbar rolls   SLR  Seated Lumbar flexion with SB       Alessia received the following manual therapy techniques: Joint mobilizations and Soft tissue Mobilization were applied to the: Cervical/ thoracic spine for 20 minutes including:  IASTM to left upper trap, scm, levator, posterior shoulder mm into rhomboids;  Trigger point release Left SCM - grabbing and lifting SCM to locate trigger points then applying pressure to create ischemia with release to affect TrP's.; S/L flexion to right / left lumbar spine; paraspinal skin rolling     The patient received the following direct contact modalities after being cleared for contraindications:     The patient received the following supervised modalities after being cleared for contradictions:     Written Home Exercises Provided:   Refreshed education on TENS unit at home; use of heat/ice for mm relaxation.   Pt demo good understanding of the education provided. Alessia demonstrated good return demonstration of activities.     Education provided re:  Alessia  verbalized good understanding of education provided.   No spiritual or educational barriers to learning provided    Assessmentift     Patient tolerated treatment well today; Alessia has multiple chronic pain issues so trying to take one at a time and see if we can influence some of her lower back issues at the same time. Encouraged her to use her TENS until at home, walk/stretch/exercises at home on daily basis.   This is a 65 y.o. female referred to outpatient physical therapy and presents with a medical diagnosis of Myofascial cervical pain syndrome  and demonstrates limitations as described in the problem list. Pt prognosis is Good. Pt will continue to benefit from skilled outpatient physical therapy to address the deficits listed in the problem list, provide pt/family education and to maximize pt's level of independence in the home and community environment.     Goals as follows:    Long Term Goals: 6 weeks  Pain: Decrease pain to no more than 4/10 to allow for improved ability to perform daily activities   Strength: Improve strength in core muscles by 1/2 grade for improved cervical stability  ROM: Improve ROM in cervical spine by 10 degrees in all planes    Functional scale: Improve score on Neck Pain Index  to <40% disability   Postures: Increase sitting and/or standing duration to 30 mins  without pain   Headaches: decrease headache occurrence from daily to no more than 3x/week   Sleep: Improve sleep - able to sleep up to 75% of normal 4 out of 7 days   Exercise: demonstrate independence with home exercise program to maintain gains made in therapy.          Plan     Continue with established Plan of Care towards PT goals.    Therapist: Trinh Reyes, PT  10/27/2020

## 2020-10-29 ENCOUNTER — TELEPHONE (OUTPATIENT)
Dept: ENDOSCOPY | Facility: HOSPITAL | Age: 65
End: 2020-10-29

## 2020-10-29 NOTE — TELEPHONE ENCOUNTER
----- Message from Maribell Cartagena sent at 10/29/2020  4:03 PM CDT -----  Contact: Pt  Type: Needs Medical Advice  Who Called:  Pt  Symptoms (please be specific):    How long has patient had these symptoms:    Pharmacy name and phone #:    Best Call Back Number: [phone]    Additional Information: Pt requesting call back regarding procedure Monday

## 2020-10-30 ENCOUNTER — CLINICAL SUPPORT (OUTPATIENT)
Dept: REHABILITATION | Facility: HOSPITAL | Age: 65
End: 2020-10-30
Payer: MEDICARE

## 2020-10-30 ENCOUNTER — TELEPHONE (OUTPATIENT)
Dept: CARDIOLOGY | Facility: CLINIC | Age: 65
End: 2020-10-30

## 2020-10-30 ENCOUNTER — LAB VISIT (OUTPATIENT)
Dept: FAMILY MEDICINE | Facility: CLINIC | Age: 65
End: 2020-10-30
Payer: MEDICARE

## 2020-10-30 DIAGNOSIS — M79.7 FIBROMYALGIA: ICD-10-CM

## 2020-10-30 DIAGNOSIS — M79.18 CERVICAL MYOFASCIAL PAIN SYNDROME: Primary | ICD-10-CM

## 2020-10-30 DIAGNOSIS — R93.3 ABNORMAL FINDING ON GI TRACT IMAGING: ICD-10-CM

## 2020-10-30 PROCEDURE — U0003 INFECTIOUS AGENT DETECTION BY NUCLEIC ACID (DNA OR RNA); SEVERE ACUTE RESPIRATORY SYNDROME CORONAVIRUS 2 (SARS-COV-2) (CORONAVIRUS DISEASE [COVID-19]), AMPLIFIED PROBE TECHNIQUE, MAKING USE OF HIGH THROUGHPUT TECHNOLOGIES AS DESCRIBED BY CMS-2020-01-R: HCPCS

## 2020-10-30 PROCEDURE — 97140 MANUAL THERAPY 1/> REGIONS: CPT | Mod: PN

## 2020-10-30 PROCEDURE — 97110 THERAPEUTIC EXERCISES: CPT | Mod: PN

## 2020-10-30 NOTE — PROGRESS NOTES
Physical Therapy Daily Note     Name: Alessia Nelson  Clinic Number: [id number]  Diagnosis:   Encounter Diagnoses   Name Primary?    Cervical myofascial pain syndrome Yes    Fibromyalgia      Physician: Lynne Parsons NP  Precautions: standard   Visit #:  7 of 12  PTA Visit #: 0  Time In: 12:15 pm   Time Out: 1:15 pm      Subjective     Pt reports:  My neck is , but my back is starting to feel much better.  Alessia mentioned that she is going for Covid test at 1:40 today; has a procedure scheduled for Monday.    Pain Scale: Alessia rates pain on a scale of 0-10 to be 5-6currently.    Objective     Alessia received individual therapeutic exercises to develop ROM, posture and core stabilization for 20 minutes including:        Serratus Lifts 2# x 20  Pullovers 4# x 15  S/L Left shoulder ER 2# x 15  Supine - PNF diagonal D2 flexion with Left/Right UE using YTB x 10 each  Thoracic rotation in side lying   Bridging   Piriformis stretch  Lumbar rolls   SLR  Seated Lumbar flexion with SB       Nu-Step x 15 mins Level 4    Alessia received the following manual therapy techniques: Joint mobilizations and Soft tissue Mobilization were applied to the: Cervical/ thoracic spine for 25 minutes including:  IASTM to left upper trap, scm, levator, posterior shoulder mm into rhomboids;  Trigger point release Left SCM - grabbing and lifting SCM to locate trigger points then applying pressure to create ischemia with release to affect TrP's.; S/L flexion to right / left lumbar spine; paraspinal skin rolling     The patient received the following direct contact modalities after being cleared for contraindications:     The patient received the following supervised modalities after being cleared for contradictions:     Written Home Exercises Provided:   Refreshed education on TENS unit at home; use of heat/ice for mm relaxation.   Pt demo good understanding of the education  provided. Alessia demonstrated good return demonstration of activities.     Education provided re:  Alessia verbalized good understanding of education provided.   No spiritual or educational barriers to learning provided    Assessmentift     Patient tolerated treatment well today; Alessia has multiple chronic pain issues so trying to take one at a time and see if we can influence some of her lower back issues at the same time. Encouraged her to use her TENS until at home, walk/stretch/exercises at home on daily basis.   This is a 65 y.o. female referred to outpatient physical therapy and presents with a medical diagnosis of Myofascial cervical pain syndrome  and demonstrates limitations as described in the problem list. Pt prognosis is Good. Pt will continue to benefit from skilled outpatient physical therapy to address the deficits listed in the problem list, provide pt/family education and to maximize pt's level of independence in the home and community environment.     Goals as follows:    Long Term Goals: 6 weeks  Pain: Decrease pain to no more than 4/10 to allow for improved ability to perform daily activities   Strength: Improve strength in core muscles by 1/2 grade for improved cervical stability  ROM: Improve ROM in cervical spine by 10 degrees in all planes    Functional scale: Improve score on Neck Pain Index  to <40% disability   Postures: Increase sitting and/or standing duration to 30 mins  without pain   Headaches: decrease headache occurrence from daily to no more than 3x/week   Sleep: Improve sleep - able to sleep up to 75% of normal 4 out of 7 days   Exercise: demonstrate independence with home exercise program to maintain gains made in therapy.          Plan     Continue with established Plan of Care towards PT goals.    Therapist: Trinh Reyes, PT  10/30/2020

## 2020-10-31 LAB — SARS-COV-2 RNA RESP QL NAA+PROBE: NOT DETECTED

## 2020-11-02 ENCOUNTER — ANESTHESIA (OUTPATIENT)
Dept: ENDOSCOPY | Facility: HOSPITAL | Age: 65
End: 2020-11-02
Payer: MEDICARE

## 2020-11-02 ENCOUNTER — ANESTHESIA EVENT (OUTPATIENT)
Dept: ENDOSCOPY | Facility: HOSPITAL | Age: 65
End: 2020-11-02
Payer: MEDICARE

## 2020-11-02 ENCOUNTER — HOSPITAL ENCOUNTER (OUTPATIENT)
Facility: HOSPITAL | Age: 65
Discharge: HOME | End: 2020-11-02
Attending: INTERNAL MEDICINE | Admitting: INTERNAL MEDICINE
Payer: MEDICARE

## 2020-11-02 VITALS
WEIGHT: 168 LBS | OXYGEN SATURATION: 100 % | RESPIRATION RATE: 16 BRPM | TEMPERATURE: 98 F | HEIGHT: 67 IN | DIASTOLIC BLOOD PRESSURE: 57 MMHG | SYSTOLIC BLOOD PRESSURE: 119 MMHG | HEART RATE: 67 BPM | BODY MASS INDEX: 26.37 KG/M2

## 2020-11-02 DIAGNOSIS — R93.3 ABNORMAL FINDING ON GI TRACT IMAGING: ICD-10-CM

## 2020-11-02 PROCEDURE — D9220A PRA ANESTHESIA: Mod: ANES,,, | Performed by: ANESTHESIOLOGY

## 2020-11-02 PROCEDURE — 37000009 HC ANESTHESIA EA ADD 15 MINS: Performed by: INTERNAL MEDICINE

## 2020-11-02 PROCEDURE — 63600175 PHARM REV CODE 636 W HCPCS: Performed by: NURSE ANESTHETIST, CERTIFIED REGISTERED

## 2020-11-02 PROCEDURE — 43259 EGD US EXAM DUODENUM/JEJUNUM: CPT | Performed by: INTERNAL MEDICINE

## 2020-11-02 PROCEDURE — 25000003 PHARM REV CODE 250: Performed by: NURSE ANESTHETIST, CERTIFIED REGISTERED

## 2020-11-02 PROCEDURE — D9220A PRA ANESTHESIA: ICD-10-PCS | Mod: ANES,,, | Performed by: ANESTHESIOLOGY

## 2020-11-02 PROCEDURE — 37000008 HC ANESTHESIA 1ST 15 MINUTES: Performed by: INTERNAL MEDICINE

## 2020-11-02 PROCEDURE — D9220A PRA ANESTHESIA: Mod: CRNA,,, | Performed by: NURSE ANESTHETIST, CERTIFIED REGISTERED

## 2020-11-02 PROCEDURE — D9220A PRA ANESTHESIA: ICD-10-PCS | Mod: CRNA,,, | Performed by: NURSE ANESTHETIST, CERTIFIED REGISTERED

## 2020-11-02 PROCEDURE — 25000003 PHARM REV CODE 250: Performed by: INTERNAL MEDICINE

## 2020-11-02 PROCEDURE — 43259 PR ENDOSCOPIC ULTRASOUND EXAM: ICD-10-PCS | Mod: ,,, | Performed by: INTERNAL MEDICINE

## 2020-11-02 PROCEDURE — 43259 EGD US EXAM DUODENUM/JEJUNUM: CPT | Mod: ,,, | Performed by: INTERNAL MEDICINE

## 2020-11-02 RX ORDER — PROPOFOL 10 MG/ML
VIAL (ML) INTRAVENOUS
Status: DISCONTINUED | OUTPATIENT
Start: 2020-11-02 | End: 2020-11-02

## 2020-11-02 RX ORDER — SODIUM CHLORIDE 0.9 % (FLUSH) 0.9 %
3 SYRINGE (ML) INJECTION
Status: DISCONTINUED | OUTPATIENT
Start: 2020-11-02 | End: 2020-11-02 | Stop reason: HOSPADM

## 2020-11-02 RX ORDER — SODIUM CHLORIDE 9 MG/ML
INJECTION, SOLUTION INTRAVENOUS CONTINUOUS
Status: DISCONTINUED | OUTPATIENT
Start: 2020-11-02 | End: 2020-11-02 | Stop reason: HOSPADM

## 2020-11-02 RX ORDER — PROPOFOL 10 MG/ML
VIAL (ML) INTRAVENOUS CONTINUOUS PRN
Status: DISCONTINUED | OUTPATIENT
Start: 2020-11-02 | End: 2020-11-02

## 2020-11-02 RX ORDER — FENTANYL CITRATE 50 UG/ML
25 INJECTION, SOLUTION INTRAMUSCULAR; INTRAVENOUS EVERY 5 MIN PRN
Status: DISCONTINUED | OUTPATIENT
Start: 2020-11-02 | End: 2020-11-02 | Stop reason: HOSPADM

## 2020-11-02 RX ORDER — LIDOCAINE HYDROCHLORIDE 20 MG/ML
INJECTION INTRAVENOUS
Status: DISCONTINUED | OUTPATIENT
Start: 2020-11-02 | End: 2020-11-02

## 2020-11-02 RX ORDER — EPHEDRINE SULFATE 50 MG/ML
INJECTION, SOLUTION INTRAVENOUS
Status: DISCONTINUED | OUTPATIENT
Start: 2020-11-02 | End: 2020-11-02

## 2020-11-02 RX ADMIN — PROPOFOL 150 MCG/KG/MIN: 10 INJECTION, EMULSION INTRAVENOUS at 09:11

## 2020-11-02 RX ADMIN — SODIUM CHLORIDE: 0.9 INJECTION, SOLUTION INTRAVENOUS at 08:11

## 2020-11-02 RX ADMIN — EPHEDRINE SULFATE 10 MG: 50 INJECTION INTRAVENOUS at 09:11

## 2020-11-02 RX ADMIN — LIDOCAINE HYDROCHLORIDE 100 MG: 20 INJECTION, SOLUTION INTRAVENOUS at 09:11

## 2020-11-02 RX ADMIN — PROPOFOL 60 MG: 10 INJECTION, EMULSION INTRAVENOUS at 09:11

## 2020-11-02 NOTE — ANESTHESIA PREPROCEDURE EVALUATION
11/02/2020  Pre-operative evaluation for Procedure(s) (LRB):  ULTRASOUND, UPPER GI TRACT, ENDOSCOPIC (N/A)    Alessia Nelson is a 65 y.o. female CAD s/p PCI 2017, CVA in 2017 (no residual symptoms), HTN, GERD, chronic pain.     Patient Active Problem List   Diagnosis    Acute renal failure    Metabolic acidosis    Sepsis with metabolic encephalopathy    CHF, chronic    HTN (hypertension), onset in her 20s    COPD (chronic obstructive pulmonary disease)    Nicotine dependence    Diarrhea, chronic    Hypernatremia    Leukocytosis, unspecified    Allergic rhinitis    Anxiety    Mild episode of recurrent major depressive disorder    Fibromyalgia    GERD (gastroesophageal reflux disease)    Hypothyroidism    Hyperlipidemia    History of malignant neoplasm of skin    MEN (multiple endocrine neoplasia)    Peripheral neuropathy    Poikiloderma    Pseudophakia    Rotator cuff injury    Tendinosis    Metatarsalgia    Dry eyes    Class 1 obesity due to excess calories with serious comorbidity and body mass index (BMI) of 30.0 to 30.9 in adult, today 29.1    Chronic bilateral low back pain without sciatica    Acute pancreatitis    Contusion of tail of pancreas    Bile duct abnormality    Gastroesophagitis    Lumbosacral spondylosis    S/P drug eluting coronary stent placement, 8/2017    Family history of premature CAD    Syncope and collapse, onset 2015, about 10 times, one episode noted hypotension at doctor office 9/2017    Multiple falls, started 9/2018, 5 times usually on standing    Excessive caffeine intake    Orthostatic hypotension    Abdominal obesity    Aspirin intolerance    Dyslipidemia    LVH (left ventricular hypertrophy) due to hypertensive disease, with heart failure    Abdominal discomfort in right upper quadrant    Chronic diarrhea    History of  "cholecystectomy    Diverticulosis of large intestine without hemorrhage    Abnormal computerized tomography of biliary tract    History of pancreatitis    Abdominal pain    Nausea    Primary osteoarthritis of right knee    Degenerative tear of medial meniscus of right knee    Baker's cyst of knee, right    Pyelonephritis    Lower urinary tract symptoms (LUTS)    Chronic pain of right knee    Unilateral primary osteoarthritis, right knee    CAD (coronary artery disease)    History of TIA (transient ischemic attack)    Status post right knee replacement    Acute kidney injury    Anemia    Enterocolitis    Hypomagnesemia    C. difficile colitis    Bradycardia    Chronic pancreatitis    Colon cancer screening    B12 deficiency    History of Clostridium difficile infection    Cervical myofascial pain syndrome       Review of patient's allergies indicates:   Allergen Reactions    Aspirin      "Makes stomach feel like it's on fire"    Lortab [hydrocodone-acetaminophen] Itching     Able to take generic Norco    Phenergan [promethazine]      SEIZURES    Promethazine hcl        No current facility-administered medications on file prior to encounter.      Current Outpatient Medications on File Prior to Encounter   Medication Sig Dispense Refill    allopurinol (ZYLOPRIM) 300 MG tablet Take 1 tablet (300 mg total) by mouth once daily. 90 tablet 3    benazepril (LOTENSIN) 20 MG tablet Take 1 tablet (20 mg total) by mouth once daily. (Patient taking differently: Take 20 mg by mouth every other day. ) 30 tablet 11    buPROPion (WELLBUTRIN XL) 150 MG TB24 tablet TAKE 1 TABLET EVERY DAY 90 tablet 3    colestipoL (COLESTID) 1 gram Tab Take 2 tablets (2 g total) by mouth once daily. 180 tablet 3    hydroCHLOROthiazide (HYDRODIURIL) 12.5 MG Tab Take 1 tablet (12.5 mg total) by mouth once daily. 30 tablet 11    levothyroxine (SYNTHROID) 75 MCG tablet Take 1 tablet (75 mcg total) by mouth once daily. 90 " tablet 3    magnesium oxide (MAG-OX) 400 mg (241.3 mg magnesium) tablet Take 1 tablet (400 mg total) by mouth 2 (two) times daily. (Patient taking differently: Take 400 mg by mouth once daily. )  0    traZODone (DESYREL) 100 MG tablet TAKE 1 TABLET (100 MG TOTAL) BY MOUTH EVERY EVENING. 90 tablet 3    acetaminophen (TYLENOL) 325 MG tablet Take 2 tablets (650 mg total) by mouth every 6 (six) hours as needed for Pain (Do not take with any other tylenol containing products).  0    diclofenac sodium (VOLTAREN) 1 % Gel Apply 2 g topically 3 (three) times daily. 100 g 2    lidocaine (LIDODERM) 5 % Place 2 patches onto the skin once daily. Remove & Discard patch within 12 hours or as directed by  patch 3    ondansetron (ZOFRAN) 4 MG tablet Take 2 tablets (8 mg total) by mouth every 8 (eight) hours as needed for Nausea. 100 tablet 0    [DISCONTINUED] escitalopram oxalate (LEXAPRO) 20 MG tablet Take 1 tablet (20 mg total) by mouth every evening. 90 tablet 2    [DISCONTINUED] metoprolol tartrate (LOPRESSOR) 100 MG tablet Take 1 tablet (100 mg total) by mouth 2 (two) times daily. 180 tablet 2       Past Surgical History:   Procedure Laterality Date    ADRENAL GLAND SURGERY      APPENDECTOMY      BACK SURGERY      CHOLECYSTECTOMY      COLONOSCOPY      CORONARY STENT PLACEMENT      CYSTOURETHROSCOPY N/A 11/13/2019    Procedure: CYSTOURETHROSCOPY;  Surgeon: Shun Nuñez MD;  Location: [facility] OR;  Service: Urology;  Laterality: N/A;    ENDOSCOPIC ULTRASOUND OF UPPER GASTROINTESTINAL TRACT N/A 11/25/2019    Procedure: ULTRASOUND, UPPER GI TRACT, ENDOSCOPIC;  Surgeon: Alhaji Bridges MD;  [facility]  Service: Endoscopy;  Laterality: N/A;  5 day hold Plavix, Dr Jovanni Serrano - pg  PM prep    HERNIA REPAIR      HYSTERECTOMY      INCISIONAL HERNIA REPAIR      INJECTION OF ANESTHETIC AGENT AROUND NERVE Right 5/27/2019    Procedure: RIGHT L5-S3 MEDIAL BRANCH BLOCKS;  Surgeon: Sneha BRIONES  MD Margo;  Location: [facility] OR;  Service: Pain Management;  Laterality: Right;  **DO NOT STOP PLAVIX**    instestine      KNEE ARTHROPLASTY Right 12/18/2019    Procedure: ARTHROPLASTY, KNEE;  Surgeon: Ike Briceño II, MD;  Location: [facility] OR;  Service: Orthopedics;  Laterality: Right;    KNEE SURGERY  1983    PARATHYROID GLAND SURGERY      3 surgeries    RADIOFREQUENCY ABLATION Right 6/10/2019    Procedure: Radiofrequency Ablation - RIGHT L3-5 RADIOFREQUENCY ABLATION WITH HALYARD COOLIEF THERMAL SYSTEM;  Surgeon: Sneha Aragon MD;  Location: [facility] OR;  Service: Pain Management;  Laterality: Right;  **HOLD PLAVIX x 7 DAYS PRIOR**    UPPER GASTROINTESTINAL ENDOSCOPY         Social History     Socioeconomic History    Marital status: Single     Spouse name: Not on file    Number of children: 0    Years of education: Not on file    Highest education level: Not on file   Occupational History    Not on file   Social Needs    Financial resource strain: Not on file    Food insecurity     Worry: Not on file     Inability: Not on file    Transportation needs     Medical: Not on file     Non-medical: Not on file   Tobacco Use    Smoking status: Current Every Day Smoker     Packs/day: 1.00     Years: 46.00     Pack years: 46.00     Types: Cigarettes    Smokeless tobacco: Never Used   Substance and Sexual Activity    Alcohol use: Yes     Comment: RARELY    Drug use: No    Sexual activity: Not Currently   Lifestyle    Physical activity     Days per week: Not on file     Minutes per session: Not on file    Stress: Not on file   Relationships    Social connections     Talks on phone: Not on file     Gets together: Not on file     Attends Zoroastrianism service: Not on file     Active member of club or organization: Not on file     Attends meetings of clubs or organizations: Not on file     Relationship status: Not on file   Other Topics Concern    Not on file   Social History Narrative    Not on file          CBC: No results for input(s): WBC, RBC, HGB, HCT, PLT, MCV, MCH, MCHC in the last 72 hours.    CMP: No results for input(s): NA, K, CL, CO2, BUN, CREATININE, GLU, MG, PHOS, CALCIUM, ALBUMIN, PROT, ALKPHOS, ALT, AST, BILITOT in the last 72 hours.    INR  No results for input(s): PT, INR, PROTIME, APTT in the last 72 hours.        Diagnostic Studies:      EKD Echo:  Results for orders placed or performed during the hospital encounter of 13   2D echo with color flow doppler   Result Value Ref Range    QEF 62     Diastolic Dysfunction No          Anesthesia Evaluation    I have reviewed the Patient Summary Reports.   I have reviewed the NPO Status.      Review of Systems  Anesthesia Hx:  History of prior surgery of interest to airway management or planning: Denies Family Hx of Anesthesia complications.   Denies Personal Hx of Anesthesia complications.       Physical Exam  General:  Obesity    Airway/Jaw/Neck:  Airway Findings: Mouth Opening: Normal Tongue: Normal  General Airway Assessment: Adult  Mallampati: II  TM Distance: Normal, at least 6 cm  Jaw/Neck Findings:  Neck ROM: Normal ROM      Dental:  Dental Findings: Upper Dentures   Chest/Lungs:  Chest/Lungs Findings: Clear to auscultation, Normal Respiratory Rate         Mental Status:  Mental Status Findings:  Cooperative, Alert and Oriented         Anesthesia Plan  Type of Anesthesia, risks & benefits discussed:  Anesthesia Type:  general  Patient's Preference:   Intra-op Monitoring Plan: standard ASA monitors  Intra-op Monitoring Plan Comments:   Post Op Pain Control Plan: multimodal analgesia  Post Op Pain Control Plan Comments:   Induction:   IV  Beta Blocker:  Patient is not currently on a Beta-Blocker (No further documentation required).       Informed Consent: Patient understands risks and agrees with Anesthesia plan.  Questions answered. Anesthesia consent signed with patient.  ASA Score: 3     Day of Surgery Review of History &  Physical:            Ready For Surgery From Anesthesia Perspective.

## 2020-11-02 NOTE — H&P
Short Stay Endoscopy History and Physical    PCP - Cheryl Bradley NP  Referring Physician - Alhaji Bridges MD  [address] [place] [address]    Procedure - eus  ASA - per anesthesia  Mallampati - per anesthesia  History of Anesthesia problems - no  Family history Anesthesia problems -  no   Plan of anesthesia - General    HPI:  This is a 65 y.o. female here for evaluation of: abnormal appearing pancreas    Reflux - no  Dysphagia - no  Abdominal pain - no  Diarrhea - no    ROS:  Constitutional: No fevers, chills, No weight loss  CV: No chest pain  Pulm: No cough, No shortness of breath  Ophtho: No vision changes  GI: see HPI  Derm: No rash    Medical History:  has a past medical history of Acute pancreatitis, CAD (coronary artery disease), CHF (congestive heart failure), COPD (chronic obstructive pulmonary disease), Depression, Diverticulosis, Encounter for blood transfusion, GERD (gastroesophageal reflux disease), History of blood clots, History of bowel resection, Hyperlipidemia, Hypertension, Peritonitis, Seizures, Stroke, Thyroid disease, and TIA (transient ischemic attack).    Surgical History:  has a past surgical history that includes Parathyroid gland surgery; instestine; Hernia repair; Hysterectomy; Cholecystectomy; Adrenal gland surgery; Colonoscopy; Upper gastrointestinal endoscopy; Incisional hernia repair; Injection of anesthetic agent around nerve (Right, 5/27/2019); Radiofrequency ablation (Right, 6/10/2019); Coronary stent placement; Appendectomy; Back surgery; Cystourethroscopy (N/A, 11/13/2019); Endoscopic ultrasound of upper gastrointestinal tract (N/A, 11/25/2019); Knee surgery (1983); and Knee Arthroplasty (Right, 12/18/2019).    Family History: family history includes Cancer in her maternal grandfather; Heart disease in her father; Stroke in her maternal grandmother and mother..    Social History:  reports that she has been smoking cigarettes. She has a 46.00  "pack-year smoking history. She has never used smokeless tobacco. She reports current alcohol use. She reports that she does not use drugs.    Review of patient's allergies indicates:   Allergen Reactions    Aspirin      "Makes stomach feel like it's on fire"    Lortab [hydrocodone-acetaminophen] Itching     Able to take generic Norco    Phenergan [promethazine]      SEIZURES    Promethazine hcl        Medications:   Medications Prior to Admission   Medication Sig Dispense Refill Last Dose    allopurinol (ZYLOPRIM) 300 MG tablet Take 1 tablet (300 mg total) by mouth once daily. 90 tablet 3 11/2/2020 at Unknown time    baclofen (LIORESAL) 10 MG tablet Take 1 tablet (10 mg total) by mouth 2 (two) times a day. 180 tablet 1 11/1/2020 at Unknown time    benazepril (LOTENSIN) 20 MG tablet Take 1 tablet (20 mg total) by mouth once daily. (Patient taking differently: Take 20 mg by mouth every other day. ) 30 tablet 11 11/1/2020 at Unknown time    buPROPion (WELLBUTRIN XL) 150 MG TB24 tablet TAKE 1 TABLET EVERY DAY 90 tablet 3 11/1/2020 at Unknown time    colestipoL (COLESTID) 1 gram Tab Take 2 tablets (2 g total) by mouth once daily. 180 tablet 3 11/1/2020 at Unknown time    gabapentin (NEURONTIN) 400 MG capsule Take 1 capsule (400 mg total) by mouth 2 (two) times daily. 180 capsule 1 11/2/2020 at Unknown time    hydroCHLOROthiazide (HYDRODIURIL) 12.5 MG Tab Take 1 tablet (12.5 mg total) by mouth once daily. 30 tablet 11 11/2/2020 at Unknown time    HYDROcodone-acetaminophen (NORCO) 7.5-325 mg per tablet Take 1 tablet by mouth 2 (two) times daily as needed for Pain. 60 tablet 0 11/2/2020 at Unknown time    levothyroxine (SYNTHROID) 75 MCG tablet Take 1 tablet (75 mcg total) by mouth once daily. 90 tablet 3 11/2/2020 at Unknown time    magnesium oxide (MAG-OX) 400 mg (241.3 mg magnesium) tablet Take 1 tablet (400 mg total) by mouth 2 (two) times daily. (Patient taking differently: Take 400 mg by mouth once " daily. )  0 11/2/2020 at Unknown time    pantoprazole (PROTONIX) 40 MG tablet TAKE 1 TABLET EVERY DAY 90 tablet 3 11/1/2020 at Unknown time    potassium chloride SA (K-DUR,KLOR-CON) 20 MEQ tablet TAKE 1 TABLET EVERY DAY 90 tablet 1 11/1/2020 at Unknown time    traZODone (DESYREL) 100 MG tablet TAKE 1 TABLET (100 MG TOTAL) BY MOUTH EVERY EVENING. 90 tablet 3 11/1/2020 at Unknown time    acetaminophen (TYLENOL) 325 MG tablet Take 2 tablets (650 mg total) by mouth every 6 (six) hours as needed for Pain (Do not take with any other tylenol containing products).  0 10/31/2020    clopidogreL (PLAVIX) 75 mg tablet TAKE 1 TABLET EVERY DAY 90 tablet 3 10/26/2020    cyanocobalamin 1,000 mcg/mL injection INJECT 1 ML INTO THE MUSCLE EVERY 14 DAYS  (VIAL EXPIRES 28 DAYS AFER OPENING) 6 mL 0 10/30/2020    diclofenac sodium (VOLTAREN) 1 % Gel Apply 2 g topically 3 (three) times daily. 100 g 2 Unknown at Unknown time    [START ON 11/22/2020] HYDROcodone-acetaminophen (NORCO) 7.5-325 mg per tablet Take 1 tablet by mouth 2 (two) times daily as needed for Pain. 60 tablet 0 Unknown at Unknown time    [START ON 12/21/2020] HYDROcodone-acetaminophen (NORCO) 7.5-325 mg per tablet Take 1 tablet by mouth 2 (two) times daily as needed for Pain. 60 tablet 0 Unknown at Unknown time    lidocaine (LIDODERM) 5 % Place 2 patches onto the skin once daily. Remove & Discard patch within 12 hours or as directed by MD Woodall patch 3 Unknown at Unknown time    ondansetron (ZOFRAN) 4 MG tablet Take 2 tablets (8 mg total) by mouth every 8 (eight) hours as needed for Nausea. 100 tablet 0 10/31/2020    TENS unit and electrodes Cmpk 1 Units by Misc.(Non-Drug; Combo Route) route daily as needed. 1 each 0     [DISCONTINUED] methocarbamoL (ROBAXIN) 750 MG Tab Take 1 tablet (750 mg total) by mouth 3 (three) times daily. 90 tablet 3        Physical Exam:    Vital Signs:   Vitals:    11/02/20 0850   BP: (!) 90/51   Pulse: (!) 53   Resp: 17   Temp: 97.9 °F  (36.6 °C)       General Appearance: Well appearing in no acute distress    Labs:  Lab Results   Component Value Date    WBC 8.67 07/09/2020    HGB 11.0 (L) 07/09/2020    HCT 34.8 (L) 07/09/2020     07/09/2020    CHOL 196 10/15/2020    TRIG 221 (H) 10/15/2020    HDL 45 10/15/2020    ALT 9 (L) 07/09/2020    AST 12 07/09/2020     07/09/2020    K 4.1 07/09/2020     07/09/2020    CREATININE 0.8 07/09/2020    BUN 15 07/09/2020    CO2 25 07/09/2020    TSH 2.099 10/15/2020       I have explained the risks and benefits of this endoscopic procedure to the patient including but not limited to bleeding, inflammation, infection, perforation, and death.      Maurilio Rodriguez MD

## 2020-11-02 NOTE — TRANSFER OF CARE
"Anesthesia Transfer of Care Note    Patient: Alessia Nelson    Procedure(s) Performed: Procedure(s) (LRB):  ULTRASOUND, UPPER GI TRACT, ENDOSCOPIC (N/A)    Patient location: PACU    Anesthesia Type: general    Transport from OR: Transported from OR on room air with adequate spontaneous ventilation    Post pain: adequate analgesia    Post assessment: no apparent anesthetic complications and tolerated procedure well    Post vital signs: stable    Level of consciousness: awake, alert and oriented    Nausea/Vomiting: no nausea/vomiting    Complications: none    Transfer of care protocol was followed      Last vitals:   Visit Vitals  BP (!) 90/51 (BP Location: Left arm, Patient Position: Lying)   Pulse (!) 53   Temp 36.6 °C (97.9 °F) (Temporal)   Resp 17   Ht 5' 7" (1.702 m)   Wt 76.2 kg (168 lb)   SpO2 98%   Breastfeeding No   BMI 26.31 kg/m²     "

## 2020-11-02 NOTE — PLAN OF CARE
Patient states they are ready to be discharged. Instructions and report given to patient; verbalizes understanding. Patient tolerating po liquids with no difficulty. Patient states pain is at a tolerable level for them. Anesthesia consent and surgical consent in chart upon patient's discharge from [facility].

## 2020-11-02 NOTE — ANESTHESIA POSTPROCEDURE EVALUATION
Anesthesia Post Evaluation    Patient: Alessia Nelson    Procedure(s) Performed: Procedure(s) (LRB):  ULTRASOUND, UPPER GI TRACT, ENDOSCOPIC (N/A)    Final Anesthesia Type: general    Patient location during evaluation: PACU  Patient participation: Yes- Able to Participate  Level of consciousness: awake and alert and oriented  Post-procedure vital signs: reviewed and stable  Pain management: adequate  Airway patency: patent  JOSE E mitigation strategies: Intraoperative administration of CPAP, nasopharyngeal airway, or oral appliance during sedation and Multimodal analgesia  PONV status at discharge: No PONV  Anesthetic complications: no      Cardiovascular status: hemodynamically stable  Respiratory status: unassisted  Hydration status: euvolemic  Follow-up not needed.          Vitals Value Taken Time   /57 11/02/20 1046   Temp 36.8 °C (98.2 °F) 11/02/20 1045   Pulse 66 11/02/20 1049   Resp 21 11/02/20 1049   SpO2 98 % 11/02/20 1047   Vitals shown include unvalidated device data.      No case tracking events are documented in the log.      Pain/Radha Score: No data recorded

## 2020-11-05 ENCOUNTER — TELEPHONE (OUTPATIENT)
Dept: FAMILY MEDICINE | Facility: CLINIC | Age: 65
End: 2020-11-05

## 2020-11-05 ENCOUNTER — CLINICAL SUPPORT (OUTPATIENT)
Dept: REHABILITATION | Facility: HOSPITAL | Age: 65
End: 2020-11-05
Payer: MEDICARE

## 2020-11-05 DIAGNOSIS — M79.18 CERVICAL MYOFASCIAL PAIN SYNDROME: Primary | ICD-10-CM

## 2020-11-05 PROCEDURE — 97110 THERAPEUTIC EXERCISES: CPT | Mod: PN

## 2020-11-05 PROCEDURE — 97140 MANUAL THERAPY 1/> REGIONS: CPT | Mod: PN

## 2020-11-05 NOTE — PROGRESS NOTES
Physical Therapy Daily Note     Name: Alessia Nelson  Clinic Number: [id number]  Diagnosis:   Encounter Diagnosis   Name Primary?    Cervical myofascial pain syndrome Yes     Physician: Lynne Parsons NP  Precautions: standard   Visit #:  8  of 12  PTA Visit #: 0  Time In: 12:15 pm   Time Out: 1:15 pm      Subjective     Pt reports:  My neck is , but my back is really feeling so much better since I started therapy   Pain Scale: Alessia rates pain on a scale of 0-10 to be 3-4 currently.    Objective     Alessia received individual therapeutic exercises to develop ROM, posture and core stabilization for 20 minutes including:        Serratus Lifts 2# x 20  Pullovers 4# x 15  S/L Left shoulder ER 2# x 15  Supine - PNF diagonal D2 flexion with Left/Right UE using YTB x 10 each  Thoracic rotation in side lying   Bridging   Piriformis stretch  Lumbar rolls   SLR  S/L hip abduction   Prone - alternate arm and leg   Prone - lumbar extension   Seated Lumbar flexion with SB       Nu-Step x 15 mins Level 6    Alessia received the following manual therapy techniques: Joint mobilizations and Soft tissue Mobilization were applied to the: Cervical/ thoracic spine for 25 minutes including:  IASTM to left upper trap, scm, levator, posterior shoulder mm into rhomboids;  Trigger point release Left SCM - grabbing and lifting SCM to locate trigger points then applying pressure to create ischemia with release to affect TrP's.; S/L flexion to right / left lumbar spine; paraspinal skin rolling     The patient received the following direct contact modalities after being cleared for contraindications:     The patient received the following supervised modalities after being cleared for contradictions:     Written Home Exercises Provided:   Alessia is performing her exercises above at home .   Pt demo good understanding of the education provided. Alessia demonstrated good return  demonstration of activities.     Education provided re:  Alessia verbalized good understanding of education provided.   No spiritual or educational barriers to learning provided    Assessmentift     Patient tolerated treatment well today; Alessia has noted progressively decreasing pain since starting therapy;    This is a 65 y.o. female referred to outpatient physical therapy and presents with a medical diagnosis of Myofascial cervical pain syndrome  and demonstrates limitations as described in the problem list. Pt prognosis is Good. Pt will continue to benefit from skilled outpatient physical therapy to address the deficits listed in the problem list, provide pt/family education and to maximize pt's level of independence in the home and community environment.     Goals as follows:    Long Term Goals: 6 weeks  Pain: Decrease pain to no more than 4/10 to allow for improved ability to perform daily activities   Strength: Improve strength in core muscles by 1/2 grade for improved cervical stability  ROM: Improve ROM in cervical spine by 10 degrees in all planes    Functional scale: Improve score on Neck Pain Index  to <40% disability   Postures: Increase sitting and/or standing duration to 30 mins  without pain   Headaches: decrease headache occurrence from daily to no more than 3x/week   Sleep: Improve sleep - able to sleep up to 75% of normal 4 out of 7 days   Exercise: demonstrate independence with home exercise program to maintain gains made in therapy.          Plan     Continue with established Plan of Care towards PT goals.    Therapist: Trinh Reyes, PT  11/5/2020

## 2020-11-05 NOTE — TELEPHONE ENCOUNTER
Population Health Outreach.  Calling pt about overdue HM screenings. Scheduled annual mammogram screening on 12/29/20 at [facility]. Also pt wanted a annual wellness visit, scheduled with ANUM Bradley on 11/10/20 at [facility].

## 2020-11-09 ENCOUNTER — CLINICAL SUPPORT (OUTPATIENT)
Dept: REHABILITATION | Facility: HOSPITAL | Age: 65
End: 2020-11-09
Payer: MEDICARE

## 2020-11-09 DIAGNOSIS — M79.18 CERVICAL MYOFASCIAL PAIN SYNDROME: Primary | ICD-10-CM

## 2020-11-09 DIAGNOSIS — M25.561 CHRONIC PAIN OF RIGHT KNEE: ICD-10-CM

## 2020-11-09 DIAGNOSIS — M54.50 CHRONIC BILATERAL LOW BACK PAIN WITHOUT SCIATICA: ICD-10-CM

## 2020-11-09 DIAGNOSIS — G89.29 CHRONIC BILATERAL LOW BACK PAIN WITHOUT SCIATICA: ICD-10-CM

## 2020-11-09 DIAGNOSIS — G89.29 CHRONIC PAIN OF RIGHT KNEE: ICD-10-CM

## 2020-11-09 PROCEDURE — 97140 MANUAL THERAPY 1/> REGIONS: CPT | Mod: PN

## 2020-11-09 PROCEDURE — 97110 THERAPEUTIC EXERCISES: CPT | Mod: PN

## 2020-11-10 ENCOUNTER — OFFICE VISIT (OUTPATIENT)
Dept: FAMILY MEDICINE | Facility: CLINIC | Age: 65
End: 2020-11-10
Payer: MEDICARE

## 2020-11-10 VITALS
WEIGHT: 172 LBS | HEIGHT: 67 IN | RESPIRATION RATE: 18 BRPM | SYSTOLIC BLOOD PRESSURE: 111 MMHG | DIASTOLIC BLOOD PRESSURE: 69 MMHG | BODY MASS INDEX: 27 KG/M2 | OXYGEN SATURATION: 96 % | HEART RATE: 68 BPM | TEMPERATURE: 97 F

## 2020-11-10 DIAGNOSIS — Z00.00 ROUTINE PHYSICAL EXAMINATION: Primary | ICD-10-CM

## 2020-11-10 PROCEDURE — 99999 PR PBB SHADOW E&M-EST. PATIENT-LVL V: ICD-10-PCS | Mod: PBBFAC,,, | Performed by: NURSE PRACTITIONER

## 2020-11-10 PROCEDURE — 3074F PR MOST RECENT SYSTOLIC BLOOD PRESSURE < 130 MM HG: ICD-10-PCS | Mod: CPTII,S$GLB,, | Performed by: NURSE PRACTITIONER

## 2020-11-10 PROCEDURE — 99999 PR PBB SHADOW E&M-EST. PATIENT-LVL V: CPT | Mod: PBBFAC,,, | Performed by: NURSE PRACTITIONER

## 2020-11-10 PROCEDURE — 3008F PR BODY MASS INDEX (BMI) DOCUMENTED: ICD-10-PCS | Mod: CPTII,S$GLB,, | Performed by: NURSE PRACTITIONER

## 2020-11-10 PROCEDURE — 3074F SYST BP LT 130 MM HG: CPT | Mod: CPTII,S$GLB,, | Performed by: NURSE PRACTITIONER

## 2020-11-10 PROCEDURE — 99396 PR PREVENTIVE VISIT,EST,40-64: ICD-10-PCS | Mod: S$GLB,,, | Performed by: NURSE PRACTITIONER

## 2020-11-10 PROCEDURE — 3078F DIAST BP <80 MM HG: CPT | Mod: CPTII,S$GLB,, | Performed by: NURSE PRACTITIONER

## 2020-11-10 PROCEDURE — 99396 PREV VISIT EST AGE 40-64: CPT | Mod: S$GLB,,, | Performed by: NURSE PRACTITIONER

## 2020-11-10 PROCEDURE — 3008F BODY MASS INDEX DOCD: CPT | Mod: CPTII,S$GLB,, | Performed by: NURSE PRACTITIONER

## 2020-11-10 PROCEDURE — 3078F PR MOST RECENT DIASTOLIC BLOOD PRESSURE < 80 MM HG: ICD-10-PCS | Mod: CPTII,S$GLB,, | Performed by: NURSE PRACTITIONER

## 2020-11-10 NOTE — PROGRESS NOTES
"Subjective:       Patient ID: Alessia Nelson is a 65 y.o. female.    Chief Complaint: Annual Exam    Ms. Alessia Nelson is a 65 year old female who presents to the clinic today for a wellness. No new concerns. Continues to participate in PT, which she feels is effective. Medications reviewed, no refills needed. Denies cp sob n/v/d    Review of Systems   Constitutional: Positive for fatigue. Negative for activity change, appetite change, chills, fever and unexpected weight change.   HENT: Negative for congestion, ear pain, postnasal drip, sore throat and tinnitus.    Eyes: Negative for pain and visual disturbance.   Respiratory: Negative for apnea, cough, chest tightness, shortness of breath and wheezing.    Cardiovascular: Negative for chest pain, palpitations and leg swelling.   Gastrointestinal: Negative for abdominal distention, abdominal pain, blood in stool, constipation, diarrhea, nausea and vomiting.   Endocrine: Negative for polydipsia and polyuria.   Genitourinary: Negative for difficulty urinating, flank pain, frequency and urgency.   Musculoskeletal: Positive for arthralgias, back pain and myalgias. Negative for joint swelling.   Skin: Negative for color change.   Allergic/Immunologic: Negative for environmental allergies and food allergies.   Neurological: Negative for dizziness, tremors, syncope, weakness, light-headedness and headaches.   Hematological: Does not bruise/bleed easily.   Psychiatric/Behavioral: Negative for agitation, decreased concentration, self-injury, sleep disturbance and suicidal ideas. The patient is not nervous/anxious.          Reviewed family, medical, surgical, and social history.    Objective:      /69 (BP Location: Left arm, Patient Position: Sitting, BP Method: Medium (Automatic))   Pulse 68   Temp 96.9 °F (36.1 °C) (Tympanic)   Resp 18   Ht 5' 7" (1.702 m)   Wt 78 kg (172 lb)   SpO2 96%   BMI 26.94 kg/m²   Physical Exam  Vitals signs reviewed. "   Constitutional:       General: She is not in acute distress.     Appearance: Normal appearance. She is well-developed. She is not diaphoretic.   HENT:      Head: Normocephalic and atraumatic.      Nose: Nose normal.      Mouth/Throat:      Mouth: Mucous membranes are moist.      Pharynx: Oropharynx is clear. Uvula midline. No uvula swelling.   Eyes:      General: Lids are normal.      Conjunctiva/sclera: Conjunctivae normal.      Pupils: Pupils are equal, round, and reactive to light.   Neck:      Musculoskeletal: Full passive range of motion without pain and normal range of motion. No neck rigidity.      Thyroid: No thyroid mass.      Trachea: Trachea normal. No tracheal tenderness.   Cardiovascular:      Rate and Rhythm: Normal rate and regular rhythm.      Pulses: Normal pulses.      Heart sounds: Normal heart sounds, S1 normal and S2 normal.   Pulmonary:      Effort: Pulmonary effort is normal. No respiratory distress.      Breath sounds: Normal breath sounds. No decreased breath sounds or wheezing.   Abdominal:      General: Bowel sounds are normal. There is no distension or abdominal bruit.      Palpations: Abdomen is soft.      Tenderness: There is no guarding.   Musculoskeletal:         General: Tenderness present. No deformity.   Lymphadenopathy:      Cervical: No cervical adenopathy.   Skin:     General: Skin is warm and dry.      Capillary Refill: Capillary refill takes less than 2 seconds.      Findings: No abrasion, laceration, lesion or rash.      Nails: There is no clubbing.     Neurological:      Mental Status: She is alert and oriented to person, place, and time.      Motor: No abnormal muscle tone.   Psychiatric:         Mood and Affect: Mood normal.         Speech: Speech normal.         Behavior: Behavior normal. Behavior is cooperative.         Thought Content: Thought content normal.         Judgment: Judgment normal.         Assessment:       1. Routine physical examination        Plan:        Routine physical examination          PLAN:  - Discussed with patient the plan of care  - Health maintenance reviewed  - Medications reviewed. Medication side effects discussed. Patient has no questions or concerns at this time. Informed patient to notify me regarding any concerns.   - Informed patient to please notify me with any questions or concerns at anytime  - Follow up ordered for 3 months      Risks, benefits, and side effects were discussed with the patient. All questions were answered to the fullest satisfaction of the patient, and pt verbalized understanding and agreement to treatment plan. Pt was to call with any new or worsening symptoms, or present to the ER.

## 2020-11-10 NOTE — PROGRESS NOTES
Physical Therapy Daily Note     Name: Alessia Nelson  Clinic Number: [id number]  Diagnosis:   Encounter Diagnoses   Name Primary?    Cervical myofascial pain syndrome Yes    Chronic pain of right knee     Chronic bilateral low back pain without sciatica      Physician: Lynne Parsons, NP  Precautions: standard   Visit #:  9 of 12  PTA Visit #: 0  Time In: 1:45 pm   Time Out: 3:15 pm     Subjective     Pt reports:  My neck is  - but better all of the time;  Alessia reports stiffness in her lower back and RLE today - missed her appointment last week and forgot her pool things the other time.   Pain Scale: Alessia rates pain on a scale of 0-10 to be 3-4 currently.    Objective     Alessia received individual therapeutic exercises to develop ROM, posture and core stabilization for 15 minutes including:    Serratus Lifts 2# x 20  Pullovers 4# x 15  S/L Left shoulder ER 2# x 15  Supine - PNF diagonal D2 flexion with Left/Right UE using YTB x 10 each  Thoracic rotation in side lying   Bridging   Piriformis stretch  Lumbar rolls   SLR  S/L hip abduction   Prone - alternate arm and leg   Prone - lumbar extension   Seated Lumbar flexion with SB     Nu-Step x 15 mins Level 6    Aquatic Exercise today - does this independently x 30 mins     Alessia received the following manual therapy techniques: Joint mobilizations and Soft tissue Mobilization were applied to the: Cervical/ thoracic spine for 25 minutes including:  IASTM to left upper trap, scm, levator, posterior shoulder mm into rhomboids;  Trigger point release Left SCM - grabbing and lifting SCM to locate trigger points then applying pressure to create ischemia with release to affect TrP's.; S/L flexion to right / left lumbar spine; paraspinal skin rolling ; Right knee - range of motion; STM to quad mm     The patient received the following direct contact modalities after being cleared for contraindications:      The patient received the following supervised modalities after being cleared for contradictions:     Written Home Exercises Provided:   Alessia is performing her exercises above at home .   Pt demo good understanding of the education provided. Alessia demonstrated good return demonstration of activities.     Education provided re:  Alessia verbalized good understanding of education provided.   No spiritual or educational barriers to learning provided    Assessmentift     Patient tolerated treatment well today; Alessia has noted progressively decreasing pain since starting therapy;  Manual tx and aquatic ex have been successful for her.   This is a 65 y.o. female referred to outpatient physical therapy and presents with a medical diagnosis of Myofascial cervical pain syndrome  and demonstrates limitations as described in the problem list. Pt prognosis is Good. Pt will continue to benefit from skilled outpatient physical therapy to address the deficits listed in the problem list, provide pt/family education and to maximize pt's level of independence in the home and community environment.     Goals as follows:    Long Term Goals: 6 weeks  Pain: Decrease pain to no more than 4/10 to allow for improved ability to perform daily activities   Strength: Improve strength in core muscles by 1/2 grade for improved cervical stability  ROM: Improve ROM in cervical spine by 10 degrees in all planes    Functional scale: Improve score on Neck Pain Index  to <40% disability   Postures: Increase sitting and/or standing duration to 30 mins  without pain   Headaches: decrease headache occurrence from daily to no more than 3x/week   Sleep: Improve sleep - able to sleep up to 75% of normal 4 out of 7 days   Exercise: demonstrate independence with home exercise program to maintain gains made in therapy.          Plan     Continue with established Plan of Care towards PT goals.    Therapist: Trinh Reyes, PT  11/9/2020

## 2020-11-12 ENCOUNTER — CLINICAL SUPPORT (OUTPATIENT)
Dept: REHABILITATION | Facility: HOSPITAL | Age: 65
End: 2020-11-12
Payer: MEDICARE

## 2020-11-12 DIAGNOSIS — M25.561 CHRONIC PAIN OF RIGHT KNEE: ICD-10-CM

## 2020-11-12 DIAGNOSIS — M79.18 CERVICAL MYOFASCIAL PAIN SYNDROME: Primary | ICD-10-CM

## 2020-11-12 DIAGNOSIS — M79.7 FIBROMYALGIA: ICD-10-CM

## 2020-11-12 DIAGNOSIS — G89.29 CHRONIC PAIN OF RIGHT KNEE: ICD-10-CM

## 2020-11-12 PROCEDURE — 97110 THERAPEUTIC EXERCISES: CPT | Mod: PN

## 2020-11-12 PROCEDURE — 97140 MANUAL THERAPY 1/> REGIONS: CPT | Mod: PN

## 2020-11-13 NOTE — PROGRESS NOTES
Physical Therapy Daily Note     Name: Alessia Nelson  Clinic Number: [id number]  Diagnosis:   Encounter Diagnoses   Name Primary?    Cervical myofascial pain syndrome Yes    Fibromyalgia     Chronic pain of right knee      Physician: Lynne Parsons NP  Precautions: standard   Visit #:  10  of 12  PTA Visit #: 0  Time In: 10:45 am   Time Out: 12:10 pm     Subjective     Pt reports:  My neck is  - but better all of the time;  Alessia reports stiffness in her lower back and RLE today- thinks she worked to hard in the pool the other day.   Pain Scale: Alessia rates pain on a scale of 0-10 to be 3-4 currently.    Objective     Alessia received individual therapeutic exercises to develop ROM, posture and core stabilization for 15 minutes including:    Serratus Lifts 2# x 20  Pullovers 4# x 15  S/L Left shoulder ER 2# x 15  Supine - PNF diagonal D2 flexion with Left/Right UE using YTB x 10 each  Thoracic rotation in side lying   Bridging   Piriformis stretch  Lumbar rolls   SLR  S/L hip abduction   Prone - alternate arm and leg   Prone - lumbar extension   Seated Lumbar flexion with SB     Nu-Step x 15 mins Level 6    Aquatic Exercise today - does this independently x 30 mins     Alessia received the following manual therapy techniques: Joint mobilizations and Soft tissue Mobilization were applied to the: Cervical/ thoracic spine for 25 minutes including:  IASTM to left upper trap, scm, levator, posterior shoulder mm into rhomboids;  Trigger point release Left SCM - grabbing and lifting SCM to locate trigger points then applying pressure to create ischemia with release to affect TrP's.; S/L flexion to right / left lumbar spine; paraspinal skin rolling ; Right knee - range of motion; STM to quad mm     The patient received the following direct contact modalities after being cleared for contraindications:     The patient received the following supervised  modalities after being cleared for contradictions:     Written Home Exercises Provided:   Alessia is performing her exercises above at home .   Pt demo good understanding of the education provided. Alessia demonstrated good return demonstration of activities.     Education provided re:  Alessia verbalized good understanding of education provided.   No spiritual or educational barriers to learning provided    Assessmentift     Patient tolerated treatment well today; Alessia has noted progressively decreasing pain since starting therapy;  Manual tx and aquatic ex have been successful for her.   This is a 65 y.o. female referred to outpatient physical therapy and presents with a medical diagnosis of Myofascial cervical pain syndrome  and demonstrates limitations as described in the problem list. Pt prognosis is Good. Pt will continue to benefit from skilled outpatient physical therapy to address the deficits listed in the problem list, provide pt/family education and to maximize pt's level of independence in the home and community environment.     Goals as follows:    Long Term Goals: 6 weeks  Pain: Decrease pain to no more than 4/10 to allow for improved ability to perform daily activities   Strength: Improve strength in core muscles by 1/2 grade for improved cervical stability  ROM: Improve ROM in cervical spine by 10 degrees in all planes    Functional scale: Improve score on Neck Pain Index  to <40% disability   Postures: Increase sitting and/or standing duration to 30 mins  without pain   Headaches: decrease headache occurrence from daily to no more than 3x/week   Sleep: Improve sleep - able to sleep up to 75% of normal 4 out of 7 days   Exercise: demonstrate independence with home exercise program to maintain gains made in therapy.          Plan     Continue with established Plan of Care towards PT goals.    Therapist: Trinh Reyes, PT  11/12/2020

## 2020-11-16 ENCOUNTER — CLINICAL SUPPORT (OUTPATIENT)
Dept: REHABILITATION | Facility: HOSPITAL | Age: 65
End: 2020-11-16
Payer: MEDICARE

## 2020-11-16 DIAGNOSIS — M25.561 CHRONIC PAIN OF RIGHT KNEE: ICD-10-CM

## 2020-11-16 DIAGNOSIS — G89.29 CHRONIC PAIN OF RIGHT KNEE: ICD-10-CM

## 2020-11-16 DIAGNOSIS — M79.18 CERVICAL MYOFASCIAL PAIN SYNDROME: ICD-10-CM

## 2020-11-16 DIAGNOSIS — M54.50 CHRONIC BILATERAL LOW BACK PAIN WITHOUT SCIATICA: Primary | ICD-10-CM

## 2020-11-16 DIAGNOSIS — G89.29 CHRONIC BILATERAL LOW BACK PAIN WITHOUT SCIATICA: Primary | ICD-10-CM

## 2020-11-16 PROCEDURE — 97110 THERAPEUTIC EXERCISES: CPT | Mod: PN

## 2020-11-16 PROCEDURE — 97140 MANUAL THERAPY 1/> REGIONS: CPT | Mod: PN

## 2020-11-16 NOTE — PROGRESS NOTES
Physical Therapy Daily Note     Name: Alessia Nelson  Clinic Number: [id number]  Diagnosis:   Encounter Diagnoses   Name Primary?    Cervical myofascial pain syndrome     Chronic bilateral low back pain without sciatica Yes    Chronic pain of right knee      Physician: Lynne Parsons NP  Precautions: standard   Visit #:  11 of 12  PTA Visit #: 0  Time In: 10:45 am   Time Out:  11:     Subjective     Pt reports:  My back and leg are bothering me today   Pain Scale: Alessia rates pain on a scale of 0-10 to be 8 currently.    Objective     Alessia received individual therapeutic exercises to develop ROM, posture and core stabilization for 15 minutes including:    Serratus Lifts 2# x 20  Pullovers 4# x 15  S/L Left shoulder ER 2# x 15  Supine - PNF diagonal D2 flexion with Left/Right UE using YTB x 10 each  Thoracic rotation in side lying   Bridging   Piriformis stretch  Lumbar rolls   SLR  S/L hip abduction   Prone - alternate arm and leg   Prone - lumbar extension   Seated Lumbar flexion with SB     Nu-Step x 10 mins Level 5    Aquatic Exercise today - does this independently x 30 mins     Alessia received the following manual therapy techniques: Joint mobilizations and Soft tissue Mobilization were applied to the: Lumbar  spine for 25 minutes including:   S/L flexion to right / left lumbar spine; PROM right knee/hip in al planes; H/S stretching in all planes; Prone - lamina release and  paraspinal skin rolling ;  STM to quad mm     The patient received the following direct contact modalities after being cleared for contraindications:     The patient received the following supervised modalities after being cleared for contradictions:     Written Home Exercises Provided:   Alessia is performing her exercises above at home .   Pt demo good understanding of the education provided. Alessia demonstrated good return demonstration of activities.     Education provided  re:  Alessia verbalized good understanding of education provided.   No spiritual or educational barriers to learning provided    Assessmentift     Patient tolerated treatment well today; Alessia has noted progressively decreasing pain since starting therapy;  Manual tx and aquatic ex have been successful for her.   This is a 65 y.o. female referred to outpatient physical therapy and presents with a medical diagnosis of Myofascial cervical pain syndrome  and demonstrates limitations as described in the problem list. Pt prognosis is Good. Pt will continue to benefit from skilled outpatient physical therapy to address the deficits listed in the problem list, provide pt/family education and to maximize pt's level of independence in the home and community environment.     Goals as follows:    Long Term Goals: 6 weeks  Pain: Decrease pain to no more than 4/10 to allow for improved ability to perform daily activities   Strength: Improve strength in core muscles by 1/2 grade for improved cervical stability  ROM: Improve ROM in cervical spine by 10 degrees in all planes    Functional scale: Improve score on Neck Pain Index  to <40% disability   Postures: Increase sitting and/or standing duration to 30 mins  without pain   Headaches: decrease headache occurrence from daily to no more than 3x/week   Sleep: Improve sleep - able to sleep up to 75% of normal 4 out of 7 days   Exercise: demonstrate independence with home exercise program to maintain gains made in therapy.          Plan     Continue with established Plan of Care towards PT goals.    Therapist: Trinh Reyes, PT  11/16/2020

## 2020-11-18 ENCOUNTER — CLINICAL SUPPORT (OUTPATIENT)
Dept: REHABILITATION | Facility: HOSPITAL | Age: 65
End: 2020-11-18
Payer: MEDICARE

## 2020-11-18 DIAGNOSIS — M79.7 FIBROMYALGIA: ICD-10-CM

## 2020-11-18 DIAGNOSIS — M79.18 CERVICAL MYOFASCIAL PAIN SYNDROME: Primary | ICD-10-CM

## 2020-11-18 DIAGNOSIS — G89.29 CHRONIC PAIN OF RIGHT KNEE: ICD-10-CM

## 2020-11-18 DIAGNOSIS — M25.561 CHRONIC PAIN OF RIGHT KNEE: ICD-10-CM

## 2020-11-18 PROCEDURE — 97110 THERAPEUTIC EXERCISES: CPT | Mod: PN

## 2020-11-18 PROCEDURE — 97140 MANUAL THERAPY 1/> REGIONS: CPT | Mod: PN

## 2020-11-18 NOTE — PROGRESS NOTES
Physical Therapy Daily Note     Name: Alessia Nelson  Clinic Number: [id number]  Diagnosis:   Encounter Diagnoses   Name Primary?    Cervical myofascial pain syndrome Yes    Chronic pain of right knee     Fibromyalgia      Physician: Lynne Parsons NP  Precautions: standard   Visit #:  12 of 12  PTA Visit #: 0  Time In: 12:15 pm  Time Out:  1:50 pm      Subjective     Pt reports:  My neck  and leg are bothering me today ; 12th visit today; Alessia expressed interest in continuing with therapy as she feels it has been a tremendous help.   Pain Scale: Alessia rates pain on a scale of 0-10 to be 6 currently.    Objective     Alessia received individual therapeutic exercises to develop ROM, posture and core stabilization for 15 minutes including:    Serratus Lifts 2# x 20  Pullovers 4# x 15  S/L Left shoulder ER 2# x 15  Supine - PNF diagonal D2 flexion with Left/Right UE using YTB x 10 each  Thoracic rotation in side lying   Bridging   Piriformis stretch  Lumbar rolls   SLR  S/L hip abduction   Prone - alternate arm and leg   Prone - lumbar extension   Seated Lumbar flexion with SB     Nu-Step x 10 mins Level 5    Aquatic Exercise today - does this independently x 30 mins     Alessia received the following manual therapy techniques: Joint mobilizations and Soft tissue Mobilization were applied to the: Lumbar  spine for 25 minutes including:   S/L flexion to right / left lumbar spine; PROM right knee/hip in al planes; H/S stretching in all planes; Prone - lamina release and  paraspinal skin rolling ;  STM to quad mm         Cervical Range of Motion: updated 11/18/2020     Degrees Pain   Flexion 25 > 30 discomfort   Extension 30 > 31 discomfort   Right Rotation 42 > 50  +   Left Rotation 50 > 50 +    Right Side Bending 20 > 26 +   Left Side Bending 15 > 38 ++      Strength: updated 11/18/2020     Left Right   DNF Unable to hold > 5 secs secondary to pain      Upper trap  4-/5 >  4/5 4-/5 > 4/5   Mid trap 4-/5 > 4+/5 4-/5 > 4/5   Lower trap 4-/5 > 4-/5 4-/5 > 4/5   Rhomboids 4-/5 4-/5 >     WFL          The patient received the following direct contact modalities after being cleared for contraindications:     The patient received the following supervised modalities after being cleared for contradictions:     Written Home Exercises Provided:   Alessia is performing her exercises above at home .   Pt demo good understanding of the education provided. Alessia demonstrated good return demonstration of activities.     Education provided re:  Alessia verbalized good understanding of education provided.   No spiritual or educational barriers to learning provided    Assessmentift     Patient tolerated treatment well today; Alessia has noted progressively decreasing pain since starting therapy;  Manual tx and aquatic ex have been successful for her. Progress toward all goals noted at 6 week samantha; Alessia is interested in continuing PT - agreed it is still beneficial and she is progressing toward goals.   This is a 65 y.o. female referred to outpatient physical therapy and presents with a medical diagnosis of Myofascial cervical pain syndrome  and demonstrates limitations as described in the problem list. Pt prognosis is Good. Pt will continue to benefit from skilled outpatient physical therapy to address the deficits listed in the problem list, provide pt/family education and to maximize pt's level of independence in the home and community environment.     Patient's goals are to get off of her pain medicines completely (has been over a year since she has been able to go without pain meds/mm relaxers.     Goals as follows:5/10    Long Term Goals: 6 weeks > Extend x 4 weeks   Pain: Decrease pain to no more than 4/10 to allow for improved ability to perform daily activities > Progress noted pain at 5-6/10 today - decreased from 9-10/10 on evaluation;   Strength: Improve strength in core muscles by 1/2  grade for improved cervgical stability - Progress noted see MMT chart above   ROM: Improve ROM in cervical spine by 10 degrees in all planes > Progress noted -  see cervical ROM above  Functional scale: Improve score on Neck Pain Index  to <40% disability - Progress noted: 42% limitation - decrease from 58% on eval.   Postures: Increase sitting and/or standing duration to 30 mins  without pain  Progress noted>variable - anywhere from 10 mins to actual 30 mins on a few occasions.   Headaches: decrease headache occurrence from daily to no more than 3x/week   > Progress noted decreased to 3-4/week   Sleep: Improve sleep - able to sleep up to 75% of normal 4 out of 7 days > Progress noted: getting over 50% but less than 75% of her normal sleep   Exercise: demonstrate independence with home exercise program to maintain gains made in therapy.          Plan     Continue with Skilled Physical Therapy to address goals 2x/week x 4 weeks.     Therapist: Trinh Reyes, PT  11/18/2020

## 2020-11-25 ENCOUNTER — CLINICAL SUPPORT (OUTPATIENT)
Dept: REHABILITATION | Facility: HOSPITAL | Age: 65
End: 2020-11-25
Payer: MEDICARE

## 2020-11-25 DIAGNOSIS — G89.29 CHRONIC PAIN OF RIGHT KNEE: ICD-10-CM

## 2020-11-25 DIAGNOSIS — M79.18 CERVICAL MYOFASCIAL PAIN SYNDROME: Primary | ICD-10-CM

## 2020-11-25 DIAGNOSIS — M25.561 CHRONIC PAIN OF RIGHT KNEE: ICD-10-CM

## 2020-11-25 DIAGNOSIS — M54.50 CHRONIC BILATERAL LOW BACK PAIN WITHOUT SCIATICA: ICD-10-CM

## 2020-11-25 DIAGNOSIS — G89.29 CHRONIC BILATERAL LOW BACK PAIN WITHOUT SCIATICA: ICD-10-CM

## 2020-11-25 PROCEDURE — 97140 MANUAL THERAPY 1/> REGIONS: CPT | Mod: PN

## 2020-11-25 PROCEDURE — 97110 THERAPEUTIC EXERCISES: CPT | Mod: PN

## 2020-11-25 NOTE — PROGRESS NOTES
"                                                    Physical Therapy Daily Note     Name: Alessia Nelson  Clinic Number: [id number]  Diagnosis:   Encounter Diagnoses   Name Primary?    Cervical myofascial pain syndrome Yes    Chronic pain of right knee     Chronic bilateral low back pain without sciatica      Physician: Lynne Parsons NP  Precautions: standard   Visit #:  1 of 8  PTA Visit #: 0  Time In: 9:15 am   Time Out:  10:00 am     Subjective     Pt reports:  "I hurt all over today"  The weather and sleeping too much yesterday has made me stiff.    Pain Scale: Alessia rates pain on a scale of 0-10 to be 6 currently.    Objective     Alessia received individual therapeutic exercises to develop ROM, posture and core stabilization for 15 minutes including:    Serratus Lifts 2# x 20  Pullovers 4# x 15  S/L Left shoulder ER 2# x 15  Supine - PNF diagonal D2 flexion with Left/Right UE using YTB x 10 each  Thoracic rotation in side lying   Bridging   Piriformis stretch  Lumbar rolls   SLR  S/L hip abduction   Prone - alternate arm and leg   Prone - lumbar extension   Seated Lumbar flexion with SB     Nu-Step x 15 mins Level 5    Aquatic Exercise today - does this independently x 30 mins     Alessia received the following manual therapy techniques: Joint mobilizations and Soft tissue Mobilization were applied to the:Cervical/ Lumbar  spine for 25 minutes including:   S/L flexion to right / left lumbar spine; PROM right knee/hip in al planes; H/S stretching in all planes; Prone - lamina release and  paraspinal skin rolling ;  STM to quad mm ; Cervical manual traction with end range hold; STM left SCM and scalenes; sub-occipital inhibition.         Cervical Range of Motion: updated 11/18/2020     Degrees Pain   Flexion 25 > 30 discomfort   Extension 30 > 31 discomfort   Right Rotation 42 > 50  +   Left Rotation 50 > 50 +    Right Side Bending 20 > 26 +   Left Side Bending 15 > 38 ++      Strength: updated 11/18/2020 "     Left Right   DNF Unable to hold > 5 secs secondary to pain      Upper trap 4-/5 >  4/5 4-/5 > 4/5   Mid trap 4-/5 > 4+/5 4-/5 > 4/5   Lower trap 4-/5 > 4-/5 4-/5 > 4/5   Rhomboids 4-/5 4-/5 >     WFL          The patient received the following direct contact modalities after being cleared for contraindications:     The patient received the following supervised modalities after being cleared for contradictions:     Written Home Exercises Provided:   Alessia is performing her exercises above at home .   Pt demo good understanding of the education provided. Alessia demonstrated good return demonstration of activities.     Education provided re:  Alessia verbalized good understanding of education provided.   No spiritual or educational barriers to learning provided    Assessmentift     Patient tolerated treatment well today; Alessia has noted progressively decreasing pain since starting therapy;  Manual tx and aquatic ex have been successful for her. Progress toward all goals noted at 6 week samantha so will be continuing PT 1-2 x week x 8 additional visits. .   This is a 65 y.o. female referred to outpatient physical therapy and presents with a medical diagnosis of Myofascial cervical pain syndrome  and demonstrates limitations as described in the problem list. Pt prognosis is Good. Pt will continue to benefit from skilled outpatient physical therapy to address the deficits listed in the problem list, provide pt/family education and to maximize pt's level of independence in the home and community environment.     Patient's goals are to get off of her pain medicines completely (has been over a year since she has been able to go without pain meds/mm relaxers.     Goals as follows:5/10    Long Term Goals: 6 weeks > Extend x 4 weeks   Pain: Decrease pain to no more than 4/10 to allow for improved ability to perform daily activities > Progress noted pain at 5-6/10 today - decreased from 9-10/10 on evaluation;   Strength:  Improve strength in core muscles by 1/2 grade for improved cervgical stability - Progress noted see MMT chart above   ROM: Improve ROM in cervical spine by 10 degrees in all planes > Progress noted -  see cervical ROM above  Functional scale: Improve score on Neck Pain Index  to <40% disability - Progress noted: 42% limitation - decrease from 58% on eval.   Postures: Increase sitting and/or standing duration to 30 mins  without pain  Progress noted>variable - anywhere from 10 mins to actual 30 mins on a few occasions.   Headaches: decrease headache occurrence from daily to no more than 3x/week   > Progress noted decreased to 3-4/week   Sleep: Improve sleep - able to sleep up to 75% of normal 4 out of 7 days > Progress noted: getting over 50% but less than 75% of her normal sleep   Exercise: demonstrate independence with home exercise program to maintain gains made in therapy.          Plan     Continue with Skilled Physical Therapy to address goals 2x/week x 4 weeks.     Therapist: Trinh Reyes, PT  11/25/2020

## 2020-11-30 ENCOUNTER — PATIENT OUTREACH (OUTPATIENT)
Dept: ADMINISTRATIVE | Facility: OTHER | Age: 65
End: 2020-11-30

## 2020-11-30 NOTE — PROGRESS NOTES
Chart was reviewed for overdue Proactive [facility] Encounters (RAFAEL)  topics  Updates were requested from care everywhere  Health Maintenance has been updated  LINKS immunization registry triggered

## 2020-12-01 ENCOUNTER — TELEPHONE (OUTPATIENT)
Dept: PAIN MEDICINE | Facility: CLINIC | Age: 65
End: 2020-12-01

## 2020-12-01 NOTE — TELEPHONE ENCOUNTER
----- Message from Agnes Birmingham sent at 12/1/2020  3:20 PM CST -----  Type:  Sooner Apoointment Request    Caller is requesting a sooner appointment.  Caller declined first available appointment listed below.  Caller will not accept being placed on the waitlist and is requesting a message be sent to doctor.    Name of Caller:  patient   When is the first available appointment?  1/14 last injection was September   Symptoms:  needs an injection   Best Call Back Number:     Additional Information:  please advise-thank you

## 2020-12-01 NOTE — TELEPHONE ENCOUNTER
Scheduled appt with Lynne for repeat TPI injections. Pt verbalized understanding of appt date time and location.

## 2020-12-02 ENCOUNTER — OFFICE VISIT (OUTPATIENT)
Dept: PAIN MEDICINE | Facility: CLINIC | Age: 65
End: 2020-12-02
Payer: MEDICARE

## 2020-12-02 VITALS
WEIGHT: 172 LBS | HEART RATE: 62 BPM | OXYGEN SATURATION: 98 % | BODY MASS INDEX: 27 KG/M2 | TEMPERATURE: 98 F | DIASTOLIC BLOOD PRESSURE: 74 MMHG | RESPIRATION RATE: 19 BRPM | HEIGHT: 67 IN | SYSTOLIC BLOOD PRESSURE: 123 MMHG

## 2020-12-02 DIAGNOSIS — M96.1 POSTLAMINECTOMY SYNDROME OF LUMBAR REGION: ICD-10-CM

## 2020-12-02 DIAGNOSIS — M79.18 MYOFASCIAL PAIN: ICD-10-CM

## 2020-12-02 DIAGNOSIS — M43.6 TORTICOLLIS: Primary | ICD-10-CM

## 2020-12-02 DIAGNOSIS — M43.07 LUMBOSACRAL SPONDYLOLYSIS: ICD-10-CM

## 2020-12-02 DIAGNOSIS — M51.36 DDD (DEGENERATIVE DISC DISEASE), LUMBAR: ICD-10-CM

## 2020-12-02 PROCEDURE — 1125F AMNT PAIN NOTED PAIN PRSNT: CPT | Mod: S$GLB,,, | Performed by: NURSE PRACTITIONER

## 2020-12-02 PROCEDURE — 20553 NJX 1/MLT TRIGGER POINTS 3/>: CPT | Mod: S$GLB,,, | Performed by: NURSE PRACTITIONER

## 2020-12-02 PROCEDURE — 3078F DIAST BP <80 MM HG: CPT | Mod: CPTII,S$GLB,, | Performed by: NURSE PRACTITIONER

## 2020-12-02 PROCEDURE — 3074F SYST BP LT 130 MM HG: CPT | Mod: CPTII,S$GLB,, | Performed by: NURSE PRACTITIONER

## 2020-12-02 PROCEDURE — 20553 PR INJECT TRIGGER POINTS, > 3: ICD-10-PCS | Mod: S$GLB,,, | Performed by: NURSE PRACTITIONER

## 2020-12-02 PROCEDURE — 3008F PR BODY MASS INDEX (BMI) DOCUMENTED: ICD-10-PCS | Mod: CPTII,S$GLB,, | Performed by: NURSE PRACTITIONER

## 2020-12-02 PROCEDURE — 99214 PR OFFICE/OUTPT VISIT, EST, LEVL IV, 30-39 MIN: ICD-10-PCS | Mod: 25,S$GLB,, | Performed by: NURSE PRACTITIONER

## 2020-12-02 PROCEDURE — 99214 OFFICE O/P EST MOD 30 MIN: CPT | Mod: 25,S$GLB,, | Performed by: NURSE PRACTITIONER

## 2020-12-02 PROCEDURE — 3008F BODY MASS INDEX DOCD: CPT | Mod: CPTII,S$GLB,, | Performed by: NURSE PRACTITIONER

## 2020-12-02 PROCEDURE — 3074F PR MOST RECENT SYSTOLIC BLOOD PRESSURE < 130 MM HG: ICD-10-PCS | Mod: CPTII,S$GLB,, | Performed by: NURSE PRACTITIONER

## 2020-12-02 PROCEDURE — 1125F PR PAIN SEVERITY QUANTIFIED, PAIN PRESENT: ICD-10-PCS | Mod: S$GLB,,, | Performed by: NURSE PRACTITIONER

## 2020-12-02 PROCEDURE — 99999 PR PBB SHADOW E&M-EST. PATIENT-LVL III: ICD-10-PCS | Mod: PBBFAC,,, | Performed by: NURSE PRACTITIONER

## 2020-12-02 PROCEDURE — 99999 PR PBB SHADOW E&M-EST. PATIENT-LVL III: CPT | Mod: PBBFAC,,, | Performed by: NURSE PRACTITIONER

## 2020-12-02 PROCEDURE — 3078F PR MOST RECENT DIASTOLIC BLOOD PRESSURE < 80 MM HG: ICD-10-PCS | Mod: CPTII,S$GLB,, | Performed by: NURSE PRACTITIONER

## 2020-12-02 RX ADMIN — METHYLPREDNISOLONE ACETATE 80 MG: 80 INJECTION, SUSPENSION INTRA-ARTICULAR; INTRALESIONAL; INTRAMUSCULAR; SOFT TISSUE at 09:12

## 2020-12-02 NOTE — PROGRESS NOTES
FOLLOW UP NOTE:     CHIEF COMPLAINT: f/u low back pain/neck pain    INITIAL HISTORY OF PRESENT ILLNESS: (Per Dr. Aragon)  Alessia Nelson is a 64 y.o. female who presents today with chronic low back pain.  She has a history of 2 lumbar surgeries in the past as well as a cervical surgery.  She reports bilateral low back pain that radiates into her right buttock and the posterior aspect of her right leg to her knee and sometimes to her calf.  She does have numbness in bilateral feet attributed to diabetic peripheral neuropathy. This is separate from her low back symptoms.   This pain is described in detail below.     Of note, she has had 4 falls, one from her BP dropping, one while she was asleep, and two others of unknown causes.     She carries a diagnosis of fibromyalgia, but she states that she does not believe she has this.     Aggravating factors:  Sitting, standing, walking, bending/twisting, lifting, exercise     Mitigating factors:  Rest, lying down, medication     Previously seeing: Dr. Cardozo, Kyler Watson PA-C, Dr. Brent Lewis     Outside records from [facility]:  Office visit from 04/18/2019:  Show ongoing treatment for her low back pain. Makes a note of a recent SI joint injection with no benefit.  Makes note of an EMG which shows neuropathy.  Shows ongoing treatment with Norco 7.5/325 mg twice daily as needed with no issues.  Other office visits from 02/26/2019, 12/20/2018, 10/25/2018 all show stable treatment with Norco 7.5/325 mg twice daily as needed with no issues.  One office visit from 11/2014 show treatment with Norco 10/325 mg with no issues    INTERVAL HISTORY OF PRESENT ILLNESS: Alessia Nelson is a 65 y.o. female  with a long history of spinal problems. She has had 2 lumbar surgeries with a fusion to L4 - L5 and has had a cervical surgery as well. She was set to trial a SCS and cancelled due to personal patient concerns. She has a complicated medical history and ongoing  "problems with bladder dysfunction, constipation, C.Diff with hospitalization, CAD, CHF, DVT, HTN, TIA and thyroid disease. She presents today for a f/u of lower back pain and c/o pain to her neck. She was diagnosed with torticollis years ago. TPI was beneficial. She is feeling much better since starting PT and the last TPI injections. She does report tenderness to the L > R scalene and cervical muscles. She does take baclofen and will add methocarbamol occasionally if severe. She reports that her lower back is more painful on the right than left. She does not have any numbness, tingling or weakness to her extremities. No radiation of pain. She has been using ice occasionally without benefit.    Patient denies of any urinary/fecal incontinence, saddle anesthesia, or weakness. She is currently in PT 3 times a week.     INTERVENTIONAL PAIN HISTORY:  · L4/5 Epidural steroid injection: For many years.  She is unsure of the last one  · Right SI joint: No benefit  · Right L5-S3 MBB: positive  · 06/10/2019:  Right L5-S3 RFA:  50% benefit  · 06/25/2019:  Right greater trochanteric bursa injection:  50% benefit  · Right knee intra-articular injection:  Limited benefit     Relevant Surgeries:   12/18/2019: Right knee Arthroplasty (Dr. Briceño)  · 2006: Lumbar surgery  · 2007: Lumbar surgery  · 2012: Lumbar surgery, PISF  · 2006: Cervical surgery    Anticoagulation: Yes; Plavix    CURRENT PAIN MEDICATIONS:   Baclofen 10mg 1 - 2 times daily  methocarbamol 750 mg 2 - 3 times daily  Tylenol 650 mg 2 times a day as needed  Norco 7.5-325 mg twice daily as needed for pain  Gabapentin 400 mg twice daily (lower dose due to "kidneys")  Lidoderm 5% patches twice daily    :  11/25/2020: Norco 7.5-325 mg #60 tablets  10/25/2020: Norco 7.5-325 mg #60 tablets  9/25/2020: Norco 7.5-325 mg #60 tablets  8/23/2020: Norco 7.5-325 mg #60 tablets  7/20/2020: Norco 7.5-325 mg #60 tablets  6/19/2020: Norco 7.5-325 mg #60 tablets  5/19/2020: Norco " "7.5-325 mg #60 tablets  4/14/2020: Norco 7.5-325 mg #60 tablets  3/12/2020: Norco 7.5-325 mg #60 tablets  2/8/2020: Norco 7.5-325 mg #60 tablets  1/6/2020: Norco 7.5-325 mg #60 tablets  12/19/2019: Norco 7.5-325 mg #28 tablets  12/2/2019: Norco 7.5-325 mg #60 tablets  10/28/2019: Norco 7.5-325 mg #60 tablets  10/3/2019: Norco 7.5-325 mg #60 tablets  *consistent previous to this date with same dosage*     IMAGING:  No new imaging to review    ROS:  Review of Systems   Constitutional: Negative for chills and fever.   HENT: Negative for sinus pain, sore throat and tinnitus.    Eyes: Negative for visual disturbance.   Respiratory: Negative for shortness of breath and wheezing.    Cardiovascular: Negative for chest pain and palpitations.   Gastrointestinal: Negative for nausea and vomiting.   Genitourinary: Negative for difficulty urinating.   Musculoskeletal: Positive for back pain, myalgias and neck pain.   Skin: Negative for rash.   Allergic/Immunologic: Negative for immunocompromised state.   Neurological: Negative for dizziness and syncope.   Hematological: Does not bruise/bleed easily.   Psychiatric/Behavioral: Negative for self-injury and suicidal ideas.        MEDICAL, SURGICAL, FAMILY, SOCIAL HX: reviewed    MEDICATIONS/ALLERGIES: reviewed    PHYSICAL EXAM:    VITALS: Vitals reviewed.   Vitals:    12/02/20 1133   BP: 123/74   Pulse: 62   Resp: 19   Temp: 97.9 °F (36.6 °C)   SpO2: 98%   Weight: 78 kg (172 lb)   Height: 5' 7" (1.702 m)   PainSc:   6       Physical Exam   Constitutional: She is oriented to person, place, and time and well-developed, well-nourished, and in no distress.   HENT:   Head: Normocephalic and atraumatic.   Eyes: Pupils are equal, round, and reactive to light. EOM are normal. Right eye exhibits no discharge. Left eye exhibits no discharge.   Cardiovascular: Normal rate.   Pulmonary/Chest: Effort normal and breath sounds normal. No respiratory distress.   Abdominal: Soft.   Musculoskeletal: "      Comments: Tender to the left cervical paraspinous and trapezius muscles. Very tight on exam   Neurological: She is alert and oriented to person, place, and time. GCS score is 15.   Skin: Skin is warm and dry. No rash noted. She is not diaphoretic.   Psychiatric: Mood, memory, affect and judgment normal.   Nursing note and vitals reviewed.       ASSESSMENT: Alessia Nelson is a 65 y.o. female with a long history of spinal problems. She has had 2 lumbar surgeries with a fusion to L4 - L5 and has had a cervical surgery as well. She was set to trial a SCS and cancelled due to personal patient concerns. She has a complicated medical history and ongoing problems with bladder dysfunction, constipation, C.Diff with hospitalization, CAD, CHF, DVT, HTN, TIA and thyroid disease. She presents today with c/o cervical myofascial pain that is the worst of her pain today. She has limited ROM due to stiffness and pain with lateral movement. We discussed management of this type of pain and she verb understanding. Trigger points provide her with significant relief. Plan below:    1. Torticollis     2. Myofascial pain     3. Postlaminectomy syndrome of lumbar region     4. DDD (degenerative disc disease), lumbar     5. Lumbosacral spondylolysis         PLAN:  1. TPI's in the office today  2. Continue medication as above  3. Can repeat right L5 - S3 RFA as needed (does not need at this time)  4. Continue physical therapy   5. Use ice and heat as needed for pain.   6. I have stressed the importance of physical activity and a home exercise plan to help with chronic pain and improve health.  7. I discussed with the patient potential secondary side effects of medication prescribed and urged the patient to read all FDA approved materials that the pharmacy provides with the prescription.  8. Discussed safety in and around the home to prevent falls and injury. Discussed any environmental changes that can be made to help with safety.    9. RTC in 1 month for follow up    Trigger point injection   Pre-procedure diagnosis: Myofascial trigger points in cervical paraspinous and trapezius muscles  Post-procedure diagnosis: Same    Timeout performed.  Upon examination, 8 trigger points were noted in the above noted regions.   After the procedure was described and informed consent obtained, the skin over the trigger points was cleaned with isopropyl alcohol. Using a 25-gauge needle, injection of 1ml of 0.25%bupvicaine and 10mg of methylprednisone was injected into each trigger point. The patient noted concordant radiating pain upon injection of her trigger points. The skin was then cleaned and bandages were applied to sites as necessary. Blood loss was <2ml. We observed the patient for 10 minutes after the procedure for any complications, and as there were none noted, the patient was then discharged home with instructions. We advised the patient that during the duration of the local anesthetic effect, this would be the optimal time to perform stretching exercises for the muscles which contain the trigger points. We also advised the patient to continue these exercises daily as this would likely give the best chance of resolution of the trigger points.      Lynne Parsons, CNP  Pain Management

## 2020-12-03 ENCOUNTER — CLINICAL SUPPORT (OUTPATIENT)
Dept: REHABILITATION | Facility: HOSPITAL | Age: 65
End: 2020-12-03
Payer: MEDICARE

## 2020-12-03 DIAGNOSIS — M79.7 FIBROMYALGIA: ICD-10-CM

## 2020-12-03 DIAGNOSIS — M79.18 CERVICAL MYOFASCIAL PAIN SYNDROME: Primary | ICD-10-CM

## 2020-12-03 PROCEDURE — 97110 THERAPEUTIC EXERCISES: CPT | Mod: PN

## 2020-12-03 PROCEDURE — 97140 MANUAL THERAPY 1/> REGIONS: CPT | Mod: PN

## 2020-12-03 RX ORDER — METHYLPREDNISOLONE ACETATE 80 MG/ML
80 INJECTION, SUSPENSION INTRA-ARTICULAR; INTRALESIONAL; INTRAMUSCULAR; SOFT TISSUE
Status: COMPLETED | OUTPATIENT
Start: 2020-12-03 | End: 2020-12-02

## 2020-12-03 NOTE — PROGRESS NOTES
Physical Therapy Daily Note     Name: Nithya Nelson  Clinic Number: [id number]  Diagnosis:   Encounter Diagnoses   Name Primary?    Cervical myofascial pain syndrome Yes    Fibromyalgia      Physician: Lynne Parsons NP  Precautions: standard   Visit #:  2 of 8  PTA Visit #: 0  Time In: 12:15 pm   Time Out:  1:30 pm     Subjective     Pt reports:   Had Trigger point injections in pain management office yesterday - neck and right side of lower back; nithya reported that immediately after the injections she felt no pain at all - lasted for the rest of the day.  She stated that she still has a knot in her neck, but overall feeling so much better than she has in a long time.   Pain Scale: Nithya rates pain on a scale of 0-10 to be 4 currently.    Objective     Nithya received individual therapeutic exercises to develop ROM, posture and core stabilization for 15 minutes including:    Serratus Lifts 2# x 20  Pullovers 4# x 15  S/L Left shoulder ER 2# x 15  Supine - PNF diagonal D2 flexion with Left/Right UE using YTB x 10 each  Thoracic rotation in side lying   Bridging   Piriformis stretch  Lumbar rolls   SLR  S/L hip abduction   Prone - alternate arm and leg   Prone - lumbar extension   Seated Lumbar flexion with SB     Nu-Step x 20 mins Level 5      Nithya received the following manual therapy techniques: Joint mobilizations and Soft tissue Mobilization were applied to the:Cervical/ Lumbar  spine for 25 minutes including:   S/L flexion to right / left lumbar spine; PROM right knee/hip in al planes; H/S stretching in all planes; Prone - lamina release and  paraspinal skin rolling ;  STM to quad mm ; Cervical manual traction with end range hold; STM left SCM and scalenes; sub-occipital inhibition.         Cervical Range of Motion:      Degrees Pain   Flexion 25 > 30 discomfort   Extension 30 > 31 discomfort   Right Rotation 42 > 50  +   Left Rotation 50 > 50 +     Right Side Bending 20 > 26 +   Left Side Bending 15 > 38 ++      Strength:     Left Right   DNF Unable to hold > 5 secs secondary to pain      Upper trap 4-/5 >  4/5 4-/5 > 4/5   Mid trap 4-/5 > 4+/5 4-/5 > 4/5   Lower trap 4-/5 > 4-/5 4-/5 > 4/5   Rhomboids 4-/5 4-/5 >     WFL          The patient received the following direct contact modalities after being cleared for contraindications:     The patient received the following supervised modalities after being cleared for contradictions:     Written Home Exercises Provided:   Alessia is performing her exercises above at home .   Pt demo good understanding of the education provided. Alessia demonstrated good return demonstration of activities.     Education provided re:  Alessia verbalized good understanding of education provided.   No spiritual or educational barriers to learning provided    Assessmentift     Patient tolerated treatment well today;  Less muscle spasm/pain in left side of neck; SCM no longer in spasm; Still has pain/stiffness in her right knee; tenderness in lower back;    This is a 65 y.o. female referred to outpatient physical therapy and presents with a medical diagnosis of Myofascial cervical pain syndrome  and demonstrates limitations as described in the problem list. Pt prognosis is Good. Pt will continue to benefit from skilled outpatient physical therapy to address the deficits listed in the problem list, provide pt/family education and to maximize pt's level of independence in the home and community environment.     Patient's goals are to get off of her pain medicines completely (has been over a year since she has been able to go without pain meds/mm relaxers.     Goals as follows:5/10    Long Term Goals: 6 weeks > Extend x 4 weeks   Pain: Decrease pain to no more than 4/10 to allow for improved ability to perform daily activities > Progress noted pain at 5-6/10 today - decreased from 9-10/10 on evaluation;   Strength: Improve strength in  core muscles by 1/2 grade for improved cervgical stability - Progress noted see MMT chart above   ROM: Improve ROM in cervical spine by 10 degrees in all planes > Progress noted -  see cervical ROM above  Functional scale: Improve score on Neck Pain Index  to <40% disability - Progress noted: 42% limitation - decrease from 58% on eval.   Postures: Increase sitting and/or standing duration to 30 mins  without pain  Progress noted>variable - anywhere from 10 mins to actual 30 mins on a few occasions.   Headaches: decrease headache occurrence from daily to no more than 3x/week   > Progress noted decreased to 3-4/week   Sleep: Improve sleep - able to sleep up to 75% of normal 4 out of 7 days > Progress noted: getting over 50% but less than 75% of her normal sleep   Exercise: demonstrate independence with home exercise program to maintain gains made in therapy.          Plan     Continue with Skilled Physical Therapy to address goals 2x/week x 4 weeks.     Therapist: Trinh Reyes, PT  12/3/2020

## 2020-12-07 RX ORDER — POTASSIUM CHLORIDE 20 MEQ/1
20 TABLET, EXTENDED RELEASE ORAL DAILY
Qty: 90 TABLET | Refills: 1 | Status: SHIPPED | OUTPATIENT
Start: 2020-12-07 | End: 2021-05-18

## 2020-12-07 NOTE — TELEPHONE ENCOUNTER
----- Message from Ariadna Cornejo sent at 12/7/2020 11:08 AM CST -----  Regarding: refill  Contact: Patient/[phone]  Type:  RX Refill Request    Who Called:  Patient/[phone] (home) [phone] (work)    Refill or New Rx:  refill  RX Name and Strength:  potassium chloride SA (K-DUR,KLOR-CON) 20 MEQ tablet      How is the patient currently taking it? (ex. 1XDay):  1xday  Is this a 30 day or 90 day RX:  90 pills  Preferred Pharmacy with phone number:        [facility] - [place], [place] - [address]  [address] [address]  Phone: [phone] Fax: [phone]     Local or Mail Order:  Mail Order  Ordering Provider:  same  Additional Information:  Please call to advise when completed.

## 2020-12-08 ENCOUNTER — CLINICAL SUPPORT (OUTPATIENT)
Dept: REHABILITATION | Facility: HOSPITAL | Age: 65
End: 2020-12-08
Payer: MEDICARE

## 2020-12-08 DIAGNOSIS — M79.18 CERVICAL MYOFASCIAL PAIN SYNDROME: Primary | ICD-10-CM

## 2020-12-08 DIAGNOSIS — M54.50 CHRONIC BILATERAL LOW BACK PAIN WITHOUT SCIATICA: ICD-10-CM

## 2020-12-08 DIAGNOSIS — G89.29 CHRONIC BILATERAL LOW BACK PAIN WITHOUT SCIATICA: ICD-10-CM

## 2020-12-08 PROCEDURE — 97140 MANUAL THERAPY 1/> REGIONS: CPT | Mod: PN

## 2020-12-08 PROCEDURE — 97110 THERAPEUTIC EXERCISES: CPT | Mod: PN

## 2020-12-08 NOTE — PROGRESS NOTES
Physical Therapy Daily Note     Name: Alessia Nelson  Clinic Number: [id number]  Diagnosis:   Encounter Diagnosis   Name Primary?    Cervical myofascial pain syndrome Yes     Physician: Lynne Parsons NP  Precautions: standard   Visit #:  3 of 8  PTA Visit #: 0  Time In: 12:15 pm   Time Out:  1:20 pm     Subjective     Pt reports:   I'm doing pretty good today; I was really pleased, I was able to stand and get all of my ingredients chopped up this morning without hurting or needing to sit down - 30-45 mins.    Pain Scale: Alessia rates pain on a scale of 0-10 to be 4 currently.    Objective     Alessia received individual therapeutic exercises to develop ROM, posture and core stabilization for 15 minutes including:    Serratus Lifts 2# x 20  Pullovers 4# x 15  S/L Left shoulder ER 2# x 15  Supine - PNF diagonal D2 flexion with Left/Right UE using YTB x 10 each  Thoracic rotation in side lying   Bridging   Piriformis stretch  Lumbar rolls   SLR  S/L hip abduction   Prone - alternate arm and leg   Prone - lumbar extension   Seated Lumbar flexion with SB     Nu-Step x 20 mins Level 5      Alessia received the following manual therapy techniques: Joint mobilizations and Soft tissue Mobilization were applied to the:Cervical/ Lumbar  spine for 25 minutes including:   S/L flexion to right / left lumbar spine; PROM right knee/hip in al planes; H/S stretching in all planes; Prone - lamina release and  paraspinal skin rolling ;  STM to quad mm ; Cervical manual traction with end range hold; STM left SCM and scalenes; sub-occipital inhibition.         Cervical Range of Motion:      Degrees Pain   Flexion 25 > 30 discomfort   Extension 30 > 31 discomfort   Right Rotation 42 > 50  +   Left Rotation 50 > 50 +    Right Side Bending 20 > 26 +   Left Side Bending 15 > 38 ++      Strength:     Left Right   DNF Unable to hold > 5 secs secondary to pain      Upper trap 4-/5 >  4/5  4-/5 > 4/5   Mid trap 4-/5 > 4+/5 4-/5 > 4/5   Lower trap 4-/5 > 4-/5 4-/5 > 4/5   Rhomboids 4-/5 4-/5 >     WFL          The patient received the following direct contact modalities after being cleared for contraindications:     The patient received the following supervised modalities after being cleared for contradictions:     Written Home Exercises Provided:   Alessia is performing her exercises above at home .   Pt demo good understanding of the education provided. Alessia demonstrated good return demonstration of activities.     Education provided re:  Alessia verbalized good understanding of education provided.   No spiritual or educational barriers to learning provided    Assessmentift     Patient tolerated treatment well today;  Less muscle spasm/pain in left side of neck; SCM no longer in spasm; Still has pain/stiffness in her right knee; tenderness in lower back, but less frequent and less intense; Progress noted.    This is a 65 y.o. female referred to outpatient physical therapy and presents with a medical diagnosis of Myofascial cervical pain syndrome  and demonstrates limitations as described in the problem list. Pt prognosis is Good. Pt will continue to benefit from skilled outpatient physical therapy to address the deficits listed in the problem list, provide pt/family education and to maximize pt's level of independence in the home and community environment.     Patient's goals are to get off of her pain medicines completely (has been over a year since she has been able to go without pain meds/mm relaxers.     Goals as follows:5/10    Long Term Goals: 6 weeks > Extend x 4 weeks   Pain: Decrease pain to no more than 4/10 to allow for improved ability to perform daily activities > Progress noted pain at 5-6/10 today - decreased from 9-10/10 on evaluation;   Strength: Improve strength in core muscles by 1/2 grade for improved cervgical stability - Progress noted see MMT chart above   ROM: Improve ROM in  cervical spine by 10 degrees in all planes > Progress noted -  see cervical ROM above  Functional scale: Improve score on Neck Pain Index  to <40% disability - Progress noted: 42% limitation - decrease from 58% on eval.   Postures: Increase sitting and/or standing duration to 30 mins  without pain  Progress noted>variable - anywhere from 10 mins to actual 30 mins on a few occasions.   Headaches: decrease headache occurrence from daily to no more than 3x/week   > Progress noted decreased to 3-4/week   Sleep: Improve sleep - able to sleep up to 75% of normal 4 out of 7 days > Progress noted: getting over 50% but less than 75% of her normal sleep   Exercise: demonstrate independence with home exercise program to maintain gains made in therapy.          Plan     Continue with Skilled Physical Therapy to address goals 2x/week x 5 more visits.     Therapist: Trinh Reyes, PT  12/8/2020

## 2020-12-10 ENCOUNTER — CLINICAL SUPPORT (OUTPATIENT)
Dept: REHABILITATION | Facility: HOSPITAL | Age: 65
End: 2020-12-10
Payer: MEDICARE

## 2020-12-10 DIAGNOSIS — M79.18 CERVICAL MYOFASCIAL PAIN SYNDROME: Primary | ICD-10-CM

## 2020-12-10 PROCEDURE — 97140 MANUAL THERAPY 1/> REGIONS: CPT | Mod: PN

## 2020-12-10 PROCEDURE — 97110 THERAPEUTIC EXERCISES: CPT | Mod: PN

## 2020-12-10 NOTE — PROGRESS NOTES
Physical Therapy Daily Note     Name: Alessia Nelson  Clinic Number: [id number]  Diagnosis:   Encounter Diagnosis   Name Primary?    Cervical myofascial pain syndrome Yes     Physician: Lynne Parsons NP  Precautions: standard   Visit #:  4 of 8  PTA Visit #: 0  Time In: 12:15 pm   Time Out:  1:00 pm     Subjective     Pt reports:   Alessia said that she feels weak on her right side - arm ingand leg; she said she feels like she might have had another TIA; denies any slurring of words, facial droop; tingling/numbness in right UE/LE; loss of movement in arm/leg; She said that her extremities just feel heavier/weaker on that side.   Pain Scale: Alessia rates pain on a scale of 0-10 to be 4 currently.    Objective     Alessia received individual therapeutic exercises to develop ROM, posture and core stabilization for 15 minutes including:    Serratus Lifts 2# x 20  Pullovers 4# x 15  S/L Left shoulder ER 2# x 15  Supine - PNF diagonal D2 flexion with Left/Right UE using YTB x 10 each  Thoracic rotation in side lying   Bridging   Piriformis stretch  Lumbar rolls   SLR  S/L hip abduction   Prone - alternate arm and leg   Prone - lumbar extension   Seated Lumbar flexion with SB     Nu-Step x 20 mins Level 5      Alessia received the following manual therapy techniques: Joint mobilizations and Soft tissue Mobilization were applied to the:Cervical/ Lumbar  spine for 25 minutes including:   S/L flexion to right / left lumbar spine; PROM right knee/hip in al planes; H/S stretching in all planes; Prone - lamina release and  paraspinal skin rolling ;  STM to quad mm ; Cervical manual traction with end range hold; STM left SCM and scalenes; sub-occipital inhibition.         Cervical Range of Motion:      Degrees Pain   Flexion 25 > 30 discomfort   Extension 30 > 31 discomfort   Right Rotation 42 > 50  +   Left Rotation 50 > 50 +    Right Side Bending 20 > 26 +   Left Side Bending  15 > 38 ++      Strength:     Left Right   DNF Unable to hold > 5 secs secondary to pain      Upper trap 4-/5 >  4/5 4-/5 > 4/5   Mid trap 4-/5 > 4+/5 4-/5 > 4/5   Lower trap 4-/5 > 4-/5 4-/5 > 4/5   Rhomboids 4-/5 4-/5 >     WFL          The patient received the following direct contact modalities after being cleared for contraindications:     The patient received the following supervised modalities after being cleared for contradictions:     Written Home Exercises Provided:   Alessia is performing her exercises above at home .   Pt demo good understanding of the education provided. Alessia demonstrated good return demonstration of activities.     Education provided re:  Alessia verbalized good understanding of education provided.   No spiritual or educational barriers to learning provided    Assessmentift     Patient tolerated treatment well today;  Able to perform exercises, Nu-step without difficulty; strength is equal right/left   This is a 65 y.o. female referred to outpatient physical therapy and presents with a medical diagnosis of Myofascial cervical pain syndrome  and demonstrates limitations as described in the problem list. Pt prognosis is Good. Pt will continue to benefit from skilled outpatient physical therapy to address the deficits listed in the problem list, provide pt/family education and to maximize pt's level of independence in the home and community environment.     Patient's goals are to get off of her pain medicines completely (has been over a year since she has been able to go without pain meds/mm relaxers. Wants her pain level to be at 5/10 or below    Goals as follows:    Long Term Goals: 6 weeks > Extend x 4 weeks   Pain: Decrease pain to no more than 4/10 to allow for improved ability to perform daily activities > Progress noted pain at 5-6/10 today - decreased from 9-10/10 on evaluation;   Strength: Improve strength in core muscles by 1/2 grade for improved cervgical stability - Progress  noted see MMT chart above   ROM: Improve ROM in cervical spine by 10 degrees in all planes > Progress noted -  see cervical ROM above  Functional scale: Improve score on Neck Pain Index  to <40% disability - Progress noted: 42% limitation - decrease from 58% on eval.   Postures: Increase sitting and/or standing duration to 30 mins  without pain  Progress noted>variable - anywhere from 10 mins to actual 30 mins on a few occasions.   Headaches: decrease headache occurrence from daily to no more than 3x/week   > Progress noted decreased to 3-4/week   Sleep: Improve sleep - able to sleep up to 75% of normal 4 out of 7 days > Progress noted: getting over 50% but less than 75% of her normal sleep   Exercise: demonstrate independence with home exercise program to maintain gains made in therapy.          Plan     Continue with Skilled Physical Therapy to address goals 2x/week x 4 more visits.     Therapist: Trinh Reyes, PT  12/10/2020

## 2020-12-11 ENCOUNTER — OFFICE VISIT (OUTPATIENT)
Dept: GASTROENTEROLOGY | Facility: CLINIC | Age: 65
End: 2020-12-11
Payer: MEDICARE

## 2020-12-11 VITALS
HEART RATE: 54 BPM | SYSTOLIC BLOOD PRESSURE: 139 MMHG | RESPIRATION RATE: 18 BRPM | DIASTOLIC BLOOD PRESSURE: 71 MMHG | HEIGHT: 67 IN | TEMPERATURE: 98 F | WEIGHT: 171 LBS | BODY MASS INDEX: 26.84 KG/M2

## 2020-12-11 DIAGNOSIS — R19.7 DIARRHEA, UNSPECIFIED TYPE: ICD-10-CM

## 2020-12-11 DIAGNOSIS — K83.9 BILE DUCT ABNORMALITY: ICD-10-CM

## 2020-12-11 DIAGNOSIS — R10.9 ABDOMINAL PAIN, UNSPECIFIED ABDOMINAL LOCATION: Primary | ICD-10-CM

## 2020-12-11 DIAGNOSIS — R10.11 ABDOMINAL DISCOMFORT IN RIGHT UPPER QUADRANT: ICD-10-CM

## 2020-12-11 DIAGNOSIS — G45.9 TIA (TRANSIENT ISCHEMIC ATTACK): ICD-10-CM

## 2020-12-11 DIAGNOSIS — Z87.19 HISTORY OF PANCREATITIS: ICD-10-CM

## 2020-12-11 DIAGNOSIS — K21.9 GASTROESOPHAGEAL REFLUX DISEASE, UNSPECIFIED WHETHER ESOPHAGITIS PRESENT: ICD-10-CM

## 2020-12-11 DIAGNOSIS — Z90.49 HISTORY OF CHOLECYSTECTOMY: ICD-10-CM

## 2020-12-11 DIAGNOSIS — K57.30 DIVERTICULOSIS OF LARGE INTESTINE WITHOUT HEMORRHAGE: ICD-10-CM

## 2020-12-11 PROCEDURE — 99214 OFFICE O/P EST MOD 30 MIN: CPT | Mod: S$GLB,,, | Performed by: INTERNAL MEDICINE

## 2020-12-11 PROCEDURE — 99214 PR OFFICE/OUTPT VISIT, EST, LEVL IV, 30-39 MIN: ICD-10-PCS | Mod: S$GLB,,, | Performed by: INTERNAL MEDICINE

## 2020-12-11 PROCEDURE — 99999 PR PBB SHADOW E&M-EST. PATIENT-LVL V: ICD-10-PCS | Mod: PBBFAC,,, | Performed by: INTERNAL MEDICINE

## 2020-12-11 PROCEDURE — 3008F BODY MASS INDEX DOCD: CPT | Mod: CPTII,S$GLB,, | Performed by: INTERNAL MEDICINE

## 2020-12-11 PROCEDURE — 3075F SYST BP GE 130 - 139MM HG: CPT | Mod: CPTII,S$GLB,, | Performed by: INTERNAL MEDICINE

## 2020-12-11 PROCEDURE — 3008F PR BODY MASS INDEX (BMI) DOCUMENTED: ICD-10-PCS | Mod: CPTII,S$GLB,, | Performed by: INTERNAL MEDICINE

## 2020-12-11 PROCEDURE — 3078F PR MOST RECENT DIASTOLIC BLOOD PRESSURE < 80 MM HG: ICD-10-PCS | Mod: CPTII,S$GLB,, | Performed by: INTERNAL MEDICINE

## 2020-12-11 PROCEDURE — 3078F DIAST BP <80 MM HG: CPT | Mod: CPTII,S$GLB,, | Performed by: INTERNAL MEDICINE

## 2020-12-11 PROCEDURE — 99999 PR PBB SHADOW E&M-EST. PATIENT-LVL V: CPT | Mod: PBBFAC,,, | Performed by: INTERNAL MEDICINE

## 2020-12-11 PROCEDURE — 3075F PR MOST RECENT SYSTOLIC BLOOD PRESS GE 130-139MM HG: ICD-10-PCS | Mod: CPTII,S$GLB,, | Performed by: INTERNAL MEDICINE

## 2020-12-11 NOTE — PROGRESS NOTES
"Subjective:       Patient ID: Alessia Nelson is a 65 y.o. female.    Chief Complaint: Follow-up    She states a week ago she developed tiredness of the right extremities.  She has been going to physical therapy.  She does have decreased strength in the right arm in comparison to the left arm.  She has seen the physical therapist for chronic neck pain.  In 2017 she was hospitalized for TIA.  At that time she had weakness of the left arm.  A week ago she experienced mental confusion and loss of memory for few hours and could not remember the details.  She states her memory has been poor since then.  She has a family history of strokes on the maternal and paternal sides of her family.  She has not seen the neurologist for years.  She had an EUS.  The pancreas is unchanged.  There is a lymph node.  She will need to have a CT scan.  The diarrhea is is watery in nature associated with incontinence.  She started taking the Imodium.  She cannot pinpoint a precipitating factor such is a specific food.  She denies hematemesis hematochezia jaundice or bleeding.      Allergies:  Review of patient's allergies indicates:   Allergen Reactions    Aspirin      "Makes stomach feel like it's on fire"    Lortab [hydrocodone-acetaminophen] Itching     Able to take generic Norco    Phenergan [promethazine]      SEIZURES    Promethazine hcl        Medications:    Current Outpatient Medications:     acetaminophen (TYLENOL) 325 MG tablet, Take 2 tablets (650 mg total) by mouth every 6 (six) hours as needed for Pain (Do not take with any other tylenol containing products)., Disp: , Rfl: 0    allopurinol (ZYLOPRIM) 300 MG tablet, Take 1 tablet (300 mg total) by mouth once daily., Disp: 90 tablet, Rfl: 3    baclofen (LIORESAL) 10 MG tablet, Take 1 tablet (10 mg total) by mouth 2 (two) times a day., Disp: 180 tablet, Rfl: 1    buPROPion (WELLBUTRIN XL) 150 MG TB24 tablet, TAKE 1 TABLET EVERY DAY, Disp: 90 tablet, Rfl: 3    " clopidogreL (PLAVIX) 75 mg tablet, TAKE 1 TABLET EVERY DAY, Disp: 90 tablet, Rfl: 3    colestipoL (COLESTID) 1 gram Tab, Take 2 tablets (2 g total) by mouth once daily., Disp: 180 tablet, Rfl: 3    cyanocobalamin 1,000 mcg/mL injection, INJECT 1 ML INTO THE MUSCLE EVERY 14 DAYS  (VIAL EXPIRES 28 DAYS AFER OPENING), Disp: 6 mL, Rfl: 0    diclofenac sodium (VOLTAREN) 1 % Gel, Apply 2 g topically 3 (three) times daily., Disp: 100 g, Rfl: 2    gabapentin (NEURONTIN) 400 MG capsule, Take 1 capsule (400 mg total) by mouth 2 (two) times daily., Disp: 180 capsule, Rfl: 1    [START ON 12/21/2020] HYDROcodone-acetaminophen (NORCO) 7.5-325 mg per tablet, Take 1 tablet by mouth 2 (two) times daily as needed for Pain., Disp: 60 tablet, Rfl: 0    levothyroxine (SYNTHROID) 75 MCG tablet, Take 1 tablet (75 mcg total) by mouth once daily., Disp: 90 tablet, Rfl: 3    lidocaine (LIDODERM) 5 %, Place 2 patches onto the skin once daily. Remove & Discard patch within 12 hours or as directed by MD, Disp: 180 patch, Rfl: 3    magnesium oxide (MAG-OX) 400 mg (241.3 mg magnesium) tablet, Take 1 tablet (400 mg total) by mouth 2 (two) times daily. (Patient taking differently: Take 400 mg by mouth once daily. ), Disp: , Rfl: 0    methocarbamoL (ROBAXIN) 750 MG Tab, TAKE 1 TABLET THREE TIMES DAILY, Disp: 270 tablet, Rfl: 2    ondansetron (ZOFRAN) 4 MG tablet, Take 2 tablets (8 mg total) by mouth every 8 (eight) hours as needed for Nausea., Disp: 100 tablet, Rfl: 0    pantoprazole (PROTONIX) 40 MG tablet, TAKE 1 TABLET EVERY DAY, Disp: 90 tablet, Rfl: 3    potassium chloride SA (K-DUR,KLOR-CON) 20 MEQ tablet, Take 1 tablet (20 mEq total) by mouth once daily., Disp: 90 tablet, Rfl: 1    TENS unit and electrodes Cmpk, 1 Units by Misc.(Non-Drug; Combo Route) route daily as needed., Disp: 1 each, Rfl: 0    traZODone (DESYREL) 100 MG tablet, TAKE 1 TABLET (100 MG TOTAL) BY MOUTH EVERY EVENING., Disp: 90 tablet, Rfl: 3    benazepril  (LOTENSIN) 20 MG tablet, Take 1 tablet (20 mg total) by mouth once daily. (Patient taking differently: Take 20 mg by mouth every other day. ), Disp: 30 tablet, Rfl: 11    hydroCHLOROthiazide (HYDRODIURIL) 12.5 MG Tab, Take 1 tablet (12.5 mg total) by mouth once daily., Disp: 30 tablet, Rfl: 11    Past Medical History:   Diagnosis Date    Acute pancreatitis     CAD (coronary artery disease)     CHF (congestive heart failure)     COPD (chronic obstructive pulmonary disease)     Depression     Diverticulosis     Encounter for blood transfusion     GERD (gastroesophageal reflux disease)     History of blood clots     1986 AFTER BOWEL RESCETION    History of bowel resection     Hyperlipidemia     Hypertension     Peritonitis     Seizures     HAD A SEIZURE FROM PHENERGAN     Stroke     Thyroid disease     TIA (transient ischemic attack)        Past Surgical History:   Procedure Laterality Date    ADRENAL GLAND SURGERY      APPENDECTOMY      BACK SURGERY      CHOLECYSTECTOMY      COLONOSCOPY      CORONARY STENT PLACEMENT      CYSTOURETHROSCOPY N/A 11/13/2019    Procedure: CYSTOURETHROSCOPY;  Surgeon: Shun Nuñez MD;  Location: [facility];  Service: Urology;  Laterality: N/A;    ENDOSCOPIC ULTRASOUND OF UPPER GASTROINTESTINAL TRACT N/A 11/25/2019    Procedure: ULTRASOUND, UPPER GI TRACT, ENDOSCOPIC;  Surgeon: Alhaji Bridges MD;  Location: [facility] ([address] [place] [address]);  Service: Endoscopy;  Laterality: N/A;  5 day hold Plavix, Dr Jovanni Serrano - pg  PM prep    ENDOSCOPIC ULTRASOUND OF UPPER GASTROINTESTINAL TRACT N/A 11/2/2020    Procedure: ULTRASOUND, UPPER GI TRACT, ENDOSCOPIC;  Surgeon: Maurilio Rodriguez MD;  Location: [facility] ([address] [place] [address]);  Service: Endoscopy;  Laterality: N/A;  5 day hold Plavix, Dr Roman Walsh - pg  Covid-19 test 10/30/20 at [facility]    HERNIA REPAIR      HYSTERECTOMY      INCISIONAL HERNIA REPAIR      INJECTION OF ANESTHETIC AGENT AROUND NERVE Right 5/27/2019    Procedure:  RIGHT L5-S3 MEDIAL BRANCH BLOCKS;  Surgeon: Sneha Aragon MD;  Location: [facility] OR;  Service: Pain Management;  Laterality: Right;  **DO NOT STOP PLAVIX**    instestine      KNEE ARTHROPLASTY Right 12/18/2019    Procedure: ARTHROPLASTY, KNEE;  Surgeon: Ike Briceño II, MD;  Location: [facility] OR;  Service: Orthopedics;  Laterality: Right;    KNEE SURGERY  1983    PARATHYROID GLAND SURGERY      3 surgeries    RADIOFREQUENCY ABLATION Right 6/10/2019    Procedure: Radiofrequency Ablation - RIGHT L3-5 RADIOFREQUENCY ABLATION WITH HALYARD COOLIEF THERMAL SYSTEM;  Surgeon: Sneha Aragon MD;  Location: [facility] OR;  Service: Pain Management;  Laterality: Right;  **HOLD PLAVIX x 7 DAYS PRIOR**    UPPER GASTROINTESTINAL ENDOSCOPY           Review of Systems   Constitutional: Negative for appetite change, fever and unexpected weight change.   HENT: Negative for trouble swallowing.         No jaundice.   Respiratory: Negative for cough, shortness of breath and wheezing.         She is a daily cigarette smoker.  She has been offered the smoking cessation program in the past and she states she will consider this option.  She denies dysphagia aspiration.  She denies hematemesis hematochezia jaundice or hemoptysis.  She denies significant coughing or sputum production but she is not physically active.   Cardiovascular: Negative for chest pain.        She denies exertional chest pain or rhythm disturbance.   Gastrointestinal: Positive for abdominal pain and diarrhea. Negative for abdominal distention, anal bleeding, blood in stool, constipation and nausea.        She has a history of diverticulosis.  She had a colonoscopy years ago.  She denies a history of polyps.  She has history gastroesophageal reflux but states her current medications have resolved her symptoms.  In the past she has been found to have pancreatitis.  She denies alcohol usage.  She has been followed for the abnormal biliary system.  She has been followed  by the pancreatologist   Musculoskeletal: Positive for arthralgias and back pain. Negative for neck pain.   Skin: Negative for pallor and rash.   Neurological: Positive for speech difficulty and weakness. Negative for dizziness, seizures, syncope and numbness.   Hematological: Negative for adenopathy.   Psychiatric/Behavioral: Negative for confusion.       Objective:      Physical Exam  Vitals signs reviewed.   Constitutional:       Appearance: She is well-developed.      Comments: Well-nourished well-hydrated elderly overweight afebrile nonicteric white female.  She is sitting comfortably in the chair.  She appears to be breathing normally.  She is not coughing.  She appears to be oriented x3 and can relate her history.  She can answer questions appropriately.  She is normocephalic.  Pupils are normal.   HENT:      Head: Normocephalic.   Eyes:      Pupils: Pupils are equal, round, and reactive to light.   Neck:      Musculoskeletal: Normal range of motion and neck supple.      Thyroid: No thyromegaly.      Trachea: No tracheal deviation.   Cardiovascular:      Rate and Rhythm: Normal rate and regular rhythm.      Heart sounds: Normal heart sounds.   Pulmonary:      Effort: Pulmonary effort is normal.      Breath sounds: Normal breath sounds.   Abdominal:      General: Bowel sounds are normal. There is no distension.      Palpations: There is no mass.      Tenderness: There is abdominal tenderness. There is no right CVA tenderness, left CVA tenderness, guarding or rebound.      Hernia: No hernia is present.      Comments: The abdomen is mildly obese with mild periumbilical tenderness.  Masses not detected.  Sounds.   Musculoskeletal: Normal range of motion.      Comments: She ambulates slowly.  She can go from the sitting the standing position without difficulty.  She appears to have adequate movement of both extremities.  Weakness is not detected.id   Lymphadenopathy:      Cervical: No cervical adenopathy.    Skin:     General: Skin is warm and dry.   Neurological:      Mental Status: She is alert and oriented to person, place, and time.      Cranial Nerves: No cranial nerve deficit.   Psychiatric:         Behavior: Behavior normal.           Plan:       Abdominal pain, unspecified abdominal location  -     CT Abdomen Pelvis W Wo Contrast; Future; Expected date: 12/11/2020  -     Creatinine, serum; Future; Expected date: 12/11/2020    Diarrhea, unspecified type  -     Gastrointestinal Pathogens Panel, PCR; Future; Expected date: 12/11/2020  -     Pancreatic elastase, fecal; Future; Expected date: 12/11/2020    TIA (transient ischemic attack)  -     US Carotid Bilateral; Future; Expected date: 12/11/2020    Gastroesophageal reflux disease, unspecified whether esophagitis present    Bile duct abnormality    Abdominal discomfort in right upper quadrant    History of cholecystectomy    Diverticulosis of large intestine without hemorrhage    History of pancreatitis     She has had weakness of her extremities with decreased memory.  Initially she will need to be evaluated by the neurologist.  She is on the Plavix.  She believes that she has had a TIA.  She will make a food diary and avoid the offending foods.  She will need GI evaluation including colonoscopy.  She will be followed by the pancreatologist.  Prior to having a colonoscopy she will need to be cleared by her cardiologist.  She is on the Plavix.  Additionally the neurologist will have to evaluate the patient and clear her for or anesthesia and colonoscopy.  In the interval she will make a food diary avoid the offending foods.  She can continue her current medications.  She will continue her reflux regimen current medications vitamins and minerals.

## 2020-12-11 NOTE — ASSESSMENT & PLAN NOTE
Chronic, controlled.  Will continue home medication.  Continuous telemetry monitoring.     STATES HA STILL PRESENT.

## 2020-12-11 NOTE — LETTER
December 14, 2020      Cheryl Bradley, ANUM  [address]  [place] [address]           [facility]  [address], [address]  [place] [address]  Phone: [phone]  Fax: [phone]          Patient: Alessia Nelson   MR Number: [id number]   YOB: 1955   Date of Visit: 12/11/2020       Dear Cheryl Bradley:    Thank you for referring Alessia Nelson to me for evaluation. Attached you will find relevant portions of my assessment and plan of care.    If you have questions, please do not hesitate to call me. I look forward to following Alessia Nelson along with you.    Sincerely,    Manpreet Fraga MD    Enclosure  CC:  No Recipients    If you would like to receive this communication electronically, please contact [email or web address] or [phone] to request more information on [employer] Link access.    For providers and/or their staff who would like to refer a patient to [facility], please contact us through our one-stop-shop provider referral line, [facility], at [phone].    If you feel you have received this communication in error or would no longer like to receive these types of communications, please e-mail [email or web address]

## 2020-12-11 NOTE — PATIENT INSTRUCTIONS
She will make a food diary and avoid the offending foods.  She can use the occasional Imodium for the diarrhea.  She continues the Colestid.  Further evaluation of the diarrhea is in progress.  Initial concern for the TIA.  Carotid ultrasound will be scheduled.  She will need to be evaluated by the neurologist.  She will continue her other medications include the Plavix and follow up with her cardiologist.

## 2020-12-14 PROBLEM — G45.9 TIA (TRANSIENT ISCHEMIC ATTACK): Status: ACTIVE | Noted: 2020-12-14

## 2020-12-15 ENCOUNTER — CLINICAL SUPPORT (OUTPATIENT)
Dept: REHABILITATION | Facility: HOSPITAL | Age: 65
End: 2020-12-15
Payer: MEDICARE

## 2020-12-15 DIAGNOSIS — M79.18 CERVICAL MYOFASCIAL PAIN SYNDROME: Primary | ICD-10-CM

## 2020-12-15 PROCEDURE — 97110 THERAPEUTIC EXERCISES: CPT | Mod: PN

## 2020-12-15 PROCEDURE — 97140 MANUAL THERAPY 1/> REGIONS: CPT | Mod: PN

## 2020-12-15 NOTE — PROGRESS NOTES
Physical Therapy Daily Note     Name: Alessia Nelson  Clinic Number: [id number]  Diagnosis:   Encounter Diagnosis   Name Primary?    Cervical myofascial pain syndrome Yes     Physician: Lynne Parsons NP  Precautions: standard   Visit #:  5 of 8  PTA Visit #: 0  Time In: 12:15 pm   Time Out:  1:30 pm     Subjective     Pt reports:   Alessia reports that she woke up this morning with right sided jaw pain; .  No locking of jaw, just felt some pain;   Pain Scale: Alessia rates pain on a scale of 0-10 to be 4 currently.    Objective     Alessia received individual therapeutic exercises to develop ROM, posture and core stabilization for 15 minutes including:    Serratus Lifts 2# x 20  Pullovers 4# x 15  S/L Left shoulder ER 2# x 15  Supine - PNF diagonal D2 flexion with Left/Right UE using YTB x 10 each  Thoracic rotation in side lying   Bridging   Piriformis stretch  Lumbar rolls   SLR  S/L hip abduction   Prone - alternate arm and leg   Prone - lumbar extension   Seated Lumbar flexion with SB     Nu-Step x 20 mins Level 5      Alessia received the following manual therapy techniques: Joint mobilizations and Soft tissue Mobilization were applied to the:Cervical/ Lumbar  spine for 25 minutes including:   S/L flexion to right / left lumbar spine; PROM right knee/hip in al planes; H/S stretching in all planes; Prone - lamina release and  paraspinal skin rolling ;  STM to quad mm ; Cervical manual traction with end range hold; STM left SCM and scalenes; sub-occipital inhibition.  STM right Ptyergoid to relieve TMJ pain;         Cervical Range of Motion:      Degrees Pain   Flexion 25 > 30 discomfort   Extension 30 > 31 discomfort   Right Rotation 42 > 50  +   Left Rotation 50 > 50 +    Right Side Bending 20 > 26 +   Left Side Bending 15 > 38 ++      Strength:     Left Right   DNF Unable to hold > 5 secs secondary to pain      Upper trap 4-/5 >  4/5 4-/5 > 4/5   Mid trap 4-/5  > 4+/5 4-/5 > 4/5   Lower trap 4-/5 > 4-/5 4-/5 > 4/5   Rhomboids 4-/5 4-/5 >     WFL          The patient received the following direct contact modalities after being cleared for contraindications:     The patient received the following supervised modalities after being cleared for contradictions:     Written Home Exercises Provided:   Alessia is performing her exercises above at home .   Pt demo good understanding of the education provided. Alessia demonstrated good return demonstration of activities.     Education provided re:  Alessia verbalized good understanding of education provided.   No spiritual or educational barriers to learning provided    Assessmentift     Patient tolerated treatment well today;  Able to perform exercises, Nu-step without difficulty; strength is equal right/left   This is a 65 y.o. female referred to outpatient physical therapy and presents with a medical diagnosis of Myofascial cervical pain syndrome  and demonstrates limitations as described in the problem list. Pt prognosis is Good. Pt will continue to benefit from skilled outpatient physical therapy to address the deficits listed in the problem list, provide pt/family education and to maximize pt's level of independence in the home and community environment.     Patient's goals are to get off of her pain medicines completely (has been over a year since she has been able to go without pain meds/mm relaxers. Wants her pain level to be at 5/10 or below    Goals as follows:    Long Term Goals: 6 weeks > Extend x 4 weeks   Pain: Decrease pain to no more than 4/10 to allow for improved ability to perform daily activities > Progress noted pain at 5-6/10 today - decreased from 9-10/10 on evaluation;   Strength: Improve strength in core muscles by 1/2 grade for improved cervgical stability - Progress noted see MMT chart above   ROM: Improve ROM in cervical spine by 10 degrees in all planes > Progress noted -  see cervical ROM  above  Functional scale: Improve score on Neck Pain Index  to <40% disability - Progress noted: 42% limitation - decrease from 58% on eval.   Postures: Increase sitting and/or standing duration to 30 mins  without pain  Progress noted>variable - anywhere from 10 mins to actual 30 mins on a few occasions.   Headaches: decrease headache occurrence from daily to no more than 3x/week   > Progress noted decreased to 3-4/week   Sleep: Improve sleep - able to sleep up to 75% of normal 4 out of 7 days > Progress noted: getting over 50% but less than 75% of her normal sleep   Exercise: demonstrate independence with home exercise program to maintain gains made in therapy.          Plan     Continue with Skilled Physical Therapy to address goals 2x/week x 3 more visits.     Therapist: Trinh Reyes, PT  12/15/2020

## 2020-12-17 ENCOUNTER — CLINICAL SUPPORT (OUTPATIENT)
Dept: REHABILITATION | Facility: HOSPITAL | Age: 65
End: 2020-12-17
Payer: MEDICARE

## 2020-12-17 DIAGNOSIS — M79.18 CERVICAL MYOFASCIAL PAIN SYNDROME: Primary | ICD-10-CM

## 2020-12-17 PROCEDURE — 97110 THERAPEUTIC EXERCISES: CPT | Mod: PN

## 2020-12-17 PROCEDURE — 97140 MANUAL THERAPY 1/> REGIONS: CPT | Mod: PN

## 2020-12-17 NOTE — PROGRESS NOTES
Physical Therapy Daily Note     Name: Alessia Nelson  Clinic Number: [id number]  Diagnosis:   Encounter Diagnosis   Name Primary?    Cervical myofascial pain syndrome Yes     Physician: Lynne Parsons NPin  Precautions: standard   Visit #:  6 of 8  PTA Visit #: 0  Time In: 12:15 pm   Time Out:  1:30 pm     Subjective     Pt reports:   Alessia reports that her lower back is hurting just a little secondary to prolonged standing to cook yesterday; otherwise she is feeling good.   Pain Scale: Alessia rates pain on a scale of 0-10 to be 4 currently.    Objective     Alessia received individual therapeutic exercises to develop ROM, posture and core stabilization for 15 minutes including:    Serratus Lifts 2# x 20  Pullovers 4# x 15  S/L Left shoulder ER 2# x 15  Supine - PNF diagonal D2 flexion with Left/Right UE using YTB x 10 each  Thoracic rotation in side lying   Bridging   Piriformis stretch  Lumbar rolls   SLR  S/L hip abduction   Prone - alternate arm and leg   Prone - lumbar extension   Seated Lumbar flexion with SB     Nu-Step x 20 mins Level 5      Alessia received the following manual therapy techniques: Joint mobilizations and Soft tissue Mobilization were applied to the:Cervical/ Lumbar  spine for 25 minutes including:   S/L flexion to right / left lumbar spine; PROM right knee/hip in al planes; H/S stretching in all planes; Prone - lamina release and  paraspinal skin rolling ;  STM to quad mm ; Cervical manual traction with end range hold; STM left SCM and scalenes; sub-occipital inhibition.  STM right Ptyergoid to relieve TMJ pain;         Cervical Range of Motion:      Degrees Pain   Flexion 25 > 30 discomfort   Extension 30 > 31 discomfort   Right Rotation 42 > 50  +   Left Rotation 50 > 50 +    Right Side Bending 20 > 26 +   Left Side Bending 15 > 38 ++      Strength:     Left Right   DNF Unable to hold > 5 secs secondary to pain      Upper trap 4-/5 >   "4/5 4-/5 > 4/5   Mid trap 4-/5 > 4+/5 4-/5 > 4/5   Lower trap 4-/5 > 4-/5 4-/5 > 4/5   Rhomboids 4-/5 4-/5 >     WFL          The patient received the following direct contact modalities after being cleared for contraindications:     The patient received the following supervised modalities after being cleared for contradictions:     Written Home Exercises Provided:   Alessia is performing her exercises above at home .   Pt demo good understanding of the education provided. Alessia demonstrated good return demonstration of activities.     Education provided re:  Alessia verbalized good understanding of education provided.   No spiritual or educational barriers to learning provided    Assessmentift     Patient tolerated treatment well today;  Able to perform exercises, Nu-step without difficulty; strength is equal right/left  Pain is gradually decreasing to point where it is "normal for Alessia; Her goal is to get her pain to less than 5 consistently - and she is there.  Discussed the need to find a program after PT in order to keep up her exercise - needs to be consistent or she will go back to pain, stiffness, decreased mobility within several months.   This is a 65 y.o. female referred to outpatient physical therapy and presents with a medical diagnosis of Myofascial cervical pain syndrome  and demonstrates limitations as described in the problem list. Pt prognosis is Good. Pt will continue to benefit from skilled outpatient physical therapy to address the deficits listed in the problem list, provide pt/family education and to maximize pt's level of independence in the home and community environment.     Patient's goals are to get off of her pain medicines completely (has been over a year since she has been able to go without pain meds/mm relaxers. Wants her pain level to be at 5/10 or below    Goals as follows:    Long Term Goals: 6 weeks > Extend x 4 weeks   Pain: Decrease pain to no more than 4/10 to allow for " improved ability to perform daily activities > Progress noted pain at 5-6/10 today - decreased from 9-10/10 on evaluation;   Strength: Improve strength in core muscles by 1/2 grade for improved cervgical stability - Progress noted see MMT chart above   ROM: Improve ROM in cervical spine by 10 degrees in all planes > Progress noted -  see cervical ROM above  Functional scale: Improve score on Neck Pain Index  to <40% disability - Progress noted: 42% limitation - decrease from 58% on eval.   Postures: Increase sitting and/or standing duration to 30 mins  without pain  Progress noted>variable - anywhere from 10 mins to actual 30 mins on a few occasions.   Headaches: decrease headache occurrence from daily to no more than 3x/week   > Progress noted decreased to 3-4/week   Sleep: Improve sleep - able to sleep up to 75% of normal 4 out of 7 days > Progress noted: getting over 50% but less than 75% of her normal sleep   Exercise: demonstrate independence with home exercise program to maintain gains made in therapy.          Plan     Continue with Skilled Physical Therapy to address goals 2x/week x 2 more visits.     Therapist: Trinh Reyes, PT  12/17/2020

## 2020-12-22 ENCOUNTER — CLINICAL SUPPORT (OUTPATIENT)
Dept: REHABILITATION | Facility: HOSPITAL | Age: 65
End: 2020-12-22
Payer: MEDICARE

## 2020-12-22 DIAGNOSIS — M79.18 CERVICAL MYOFASCIAL PAIN SYNDROME: Primary | ICD-10-CM

## 2020-12-22 DIAGNOSIS — M54.50 CHRONIC BILATERAL LOW BACK PAIN WITHOUT SCIATICA: ICD-10-CM

## 2020-12-22 DIAGNOSIS — G89.29 CHRONIC BILATERAL LOW BACK PAIN WITHOUT SCIATICA: ICD-10-CM

## 2020-12-22 PROCEDURE — 97140 MANUAL THERAPY 1/> REGIONS: CPT | Mod: PN

## 2020-12-22 PROCEDURE — 97110 THERAPEUTIC EXERCISES: CPT | Mod: PN

## 2020-12-22 NOTE — PROGRESS NOTES
Physical Therapy Daily Note     Name: Alessia Nelson  Clinic Number: [id number]  Diagnosis:   Encounter Diagnoses   Name Primary?    Cervical myofascial pain syndrome Yes    Chronic bilateral low back pain without sciatica      Physician: Lynne Parsons NP  Precautions: standard   Visit #:  7 of 8  PTA Visit #: 0  Time In: 12:15 pm   Time Out:  1:30 pm      Subjective     Pt reports:   Alessia reports that her whole body was hurting this morning, but feels better at this point.    Pain Scale: Alessia rates pain on a scale of 0-10 to be 4 currently.    Objective     Alessia received individual therapeutic exercises to develop ROM, posture and core stabilization for 15 minutes including:    Serratus Lifts 2# x 20  Pullovers 4# x 15  S/L Left shoulder ER 2# x 15  Supine - PNF diagonal D2 flexion with Left/Right UE using YTB x 10 each  Thoracic rotation in side lying   Bridging   Piriformis stretch  Lumbar rolls   SLR  S/L hip abduction   Prone - alternate arm and leg   Prone - lumbar extension   Seated Lumbar flexion with SB     Nu-Step x 20 mins Level 5      Alessia received the following manual therapy techniques: Joint mobilizations and Soft tissue Mobilization were applied to the:Cervical/ Lumbar  spine for 25 minutes including:   S/L flexion to right / left lumbar spine; PROM right knee/hip in al planes; H/S stretching in all planes; Prone - lamina release and  paraspinal skin rolling ;  STM to quad mm ; Cervical manual traction with end range hold; STM left SCM and scalenes; sub-occipital inhibition.  STM right Ptyergoid to relieve TMJ pain;         Cervical Range of Motion:      Degrees Pain   Flexion 25 > 30 discomfort   Extension 30 > 31 discomfort   Right Rotation 42 > 50  +   Left Rotation 50 > 50 +    Right Side Bending 20 > 26 +   Left Side Bending 15 > 38 ++      Strength:     Left Right   DNF Unable to hold > 5 secs secondary to pain      Upper trap 4-/5  ">  4/5 4-/5 > 4/5   Mid trap 4-/5 > 4+/5 4-/5 > 4/5   Lower trap 4-/5 > 4-/5 4-/5 > 4/5   Rhomboids 4-/5 4-/5 >     WFL          The patient received the following direct contact modalities after being cleared for contraindications:     The patient received the following supervised modalities after being cleared for contradictions:     Written Home Exercises Provided:   Alessia is performing her exercises above at home .   Pt demo good understanding of the education provided. Alessia demonstrated good return demonstration of activities.     Education provided re:  Alessia verbalized good understanding of education provided.   No spiritual or educational barriers to learning provided    Assessmentift     Patient tolerated treatment well today;  Able to perform exercises, Nu-step without difficulty; strength is equal right/left  Pain is gradually decreasing to point where it is "normal for Alessia; Her goal is to get her pain to less than 5 consistently - and she is there.  Discussed the need to find a program after PT in order to keep up her exercise - needs to be consistent or she will go back to pain, stiffness, decreased mobility within several months.   This is a 65 y.o. female referred to outpatient physical therapy and presents with a medical diagnosis of Myofascial cervical pain syndrome  and demonstrates limitations as described in the problem list. Pt prognosis is Good. Pt will continue to benefit from skilled outpatient physical therapy to address the deficits listed in the problem list, provide pt/family education and to maximize pt's level of independence in the home and community environment.     Patient's goals are to get off of her pain medicines completely (has been over a year since she has been able to go without pain meds/mm relaxers. Wants her pain level to be at 5/10 or below    Goals as follows:    Long Term Goals: 6 weeks > Extend x 4 weeks   Pain: Decrease pain to no more than 4/10 to allow for " improved ability to perform daily activities > Progress noted pain at 5-6/10 today - decreased from 9-10/10 on evaluation;   Strength: Improve strength in core muscles by 1/2 grade for improved cervgical stability - Progress noted see MMT chart above   ROM: Improve ROM in cervical spine by 10 degrees in all planes > Progress noted -  see cervical ROM above  Functional scale: Improve score on Neck Pain Index  to <40% disability - Progress noted: 42% limitation - decrease from 58% on eval.   Postures: Increase sitting and/or standing duration to 30 mins  without pain  Progress noted>variable - anywhere from 10 mins to actual 30 mins on a few occasions.   Headaches: decrease headache occurrence from daily to no more than 3x/week   > Progress noted decreased to 3-4/week   Sleep: Improve sleep - able to sleep up to 75% of normal 4 out of 7 days > Progress noted: getting over 50% but less than 75% of her normal sleep   Exercise: demonstrate independence with home exercise program to maintain gains made in therapy.          Plan     Continue with Skilled Physical Therapy to address goals - 1 more visit then discharge to [facility]     Therapist: Trinh Reyes, PT  12/22/2020

## 2020-12-23 ENCOUNTER — HOSPITAL ENCOUNTER (OUTPATIENT)
Dept: RADIOLOGY | Facility: HOSPITAL | Age: 65
Discharge: HOME | End: 2020-12-23
Attending: INTERNAL MEDICINE
Payer: MEDICARE

## 2020-12-23 DIAGNOSIS — R10.9 ABDOMINAL PAIN, UNSPECIFIED ABDOMINAL LOCATION: ICD-10-CM

## 2020-12-23 DIAGNOSIS — G45.9 TIA (TRANSIENT ISCHEMIC ATTACK): ICD-10-CM

## 2020-12-23 PROCEDURE — 74176 CT ABD & PELVIS W/O CONTRAST: CPT | Mod: 26,,, | Performed by: RADIOLOGY

## 2020-12-23 PROCEDURE — 74176 CT ABDOMEN PELVIS WITHOUT CONTRAST: ICD-10-PCS | Mod: 26,,, | Performed by: RADIOLOGY

## 2020-12-23 PROCEDURE — 93880 EXTRACRANIAL BILAT STUDY: CPT | Mod: TC

## 2020-12-23 PROCEDURE — 93880 EXTRACRANIAL BILAT STUDY: CPT | Mod: 26,,, | Performed by: RADIOLOGY

## 2020-12-23 PROCEDURE — 25500020 PHARM REV CODE 255: Performed by: INTERNAL MEDICINE

## 2020-12-23 PROCEDURE — 93880 US CAROTID BILATERAL: ICD-10-PCS | Mod: 26,,, | Performed by: RADIOLOGY

## 2020-12-23 PROCEDURE — A9698 NON-RAD CONTRAST MATERIALNOC: HCPCS | Performed by: INTERNAL MEDICINE

## 2020-12-23 PROCEDURE — 74176 CT ABD & PELVIS W/O CONTRAST: CPT | Mod: TC

## 2020-12-23 RX ADMIN — IOHEXOL 1000 ML: 9 SOLUTION ORAL at 10:12

## 2020-12-29 ENCOUNTER — CLINICAL SUPPORT (OUTPATIENT)
Dept: REHABILITATION | Facility: HOSPITAL | Age: 65
End: 2020-12-29
Attending: NURSE PRACTITIONER
Payer: MEDICARE

## 2020-12-29 ENCOUNTER — HOSPITAL ENCOUNTER (OUTPATIENT)
Dept: RADIOLOGY | Facility: HOSPITAL | Age: 65
Discharge: HOME | End: 2020-12-29
Attending: NURSE PRACTITIONER
Payer: MEDICARE

## 2020-12-29 ENCOUNTER — OFFICE VISIT (OUTPATIENT)
Dept: GASTROENTEROLOGY | Facility: CLINIC | Age: 65
End: 2020-12-29
Payer: MEDICARE

## 2020-12-29 VITALS
RESPIRATION RATE: 18 BRPM | DIASTOLIC BLOOD PRESSURE: 55 MMHG | SYSTOLIC BLOOD PRESSURE: 92 MMHG | TEMPERATURE: 98 F | WEIGHT: 169 LBS | HEIGHT: 67 IN | BODY MASS INDEX: 26.53 KG/M2 | OXYGEN SATURATION: 96 % | HEART RATE: 52 BPM

## 2020-12-29 DIAGNOSIS — R19.7 DIARRHEA, UNSPECIFIED TYPE: ICD-10-CM

## 2020-12-29 DIAGNOSIS — R10.9 ABDOMINAL PAIN, UNSPECIFIED ABDOMINAL LOCATION: ICD-10-CM

## 2020-12-29 DIAGNOSIS — G89.29 CHRONIC BILATERAL LOW BACK PAIN WITHOUT SCIATICA: ICD-10-CM

## 2020-12-29 DIAGNOSIS — M79.18 CERVICAL MYOFASCIAL PAIN SYNDROME: Primary | ICD-10-CM

## 2020-12-29 DIAGNOSIS — K57.30 DIVERTICULOSIS OF LARGE INTESTINE WITHOUT HEMORRHAGE: ICD-10-CM

## 2020-12-29 DIAGNOSIS — M54.50 CHRONIC BILATERAL LOW BACK PAIN WITHOUT SCIATICA: ICD-10-CM

## 2020-12-29 DIAGNOSIS — Z87.19 HISTORY OF PANCREATITIS: ICD-10-CM

## 2020-12-29 DIAGNOSIS — K83.9 BILE DUCT ABNORMALITY: ICD-10-CM

## 2020-12-29 DIAGNOSIS — K86.1 CHRONIC PANCREATITIS, UNSPECIFIED PANCREATITIS TYPE: ICD-10-CM

## 2020-12-29 DIAGNOSIS — Z90.49 HISTORY OF CHOLECYSTECTOMY: ICD-10-CM

## 2020-12-29 DIAGNOSIS — Z12.31 ENCOUNTER FOR SCREENING MAMMOGRAM FOR MALIGNANT NEOPLASM OF BREAST: ICD-10-CM

## 2020-12-29 DIAGNOSIS — K21.9 GASTROESOPHAGEAL REFLUX DISEASE, UNSPECIFIED WHETHER ESOPHAGITIS PRESENT: ICD-10-CM

## 2020-12-29 DIAGNOSIS — R11.0 NAUSEA: ICD-10-CM

## 2020-12-29 DIAGNOSIS — K86.89 PANCREATIC INSUFFICIENCY: Primary | ICD-10-CM

## 2020-12-29 DIAGNOSIS — Z12.39 SCREENING FOR BREAST CANCER: ICD-10-CM

## 2020-12-29 PROCEDURE — 3074F SYST BP LT 130 MM HG: CPT | Mod: CPTII,S$GLB,, | Performed by: INTERNAL MEDICINE

## 2020-12-29 PROCEDURE — 99999 PR PBB SHADOW E&M-EST. PATIENT-LVL V: ICD-10-PCS | Mod: PBBFAC,,, | Performed by: INTERNAL MEDICINE

## 2020-12-29 PROCEDURE — 1126F PR PAIN SEVERITY QUANTIFIED, NO PAIN PRESENT: ICD-10-PCS | Mod: S$GLB,,, | Performed by: INTERNAL MEDICINE

## 2020-12-29 PROCEDURE — 97140 MANUAL THERAPY 1/> REGIONS: CPT | Mod: PN

## 2020-12-29 PROCEDURE — 77063 MAMMO DIGITAL SCREENING BILAT WITH TOMO: ICD-10-PCS | Mod: 26,,, | Performed by: RADIOLOGY

## 2020-12-29 PROCEDURE — 3008F PR BODY MASS INDEX (BMI) DOCUMENTED: ICD-10-PCS | Mod: CPTII,S$GLB,, | Performed by: INTERNAL MEDICINE

## 2020-12-29 PROCEDURE — 3078F PR MOST RECENT DIASTOLIC BLOOD PRESSURE < 80 MM HG: ICD-10-PCS | Mod: CPTII,S$GLB,, | Performed by: INTERNAL MEDICINE

## 2020-12-29 PROCEDURE — 99999 PR PBB SHADOW E&M-EST. PATIENT-LVL V: CPT | Mod: PBBFAC,,, | Performed by: INTERNAL MEDICINE

## 2020-12-29 PROCEDURE — 99214 OFFICE O/P EST MOD 30 MIN: CPT | Mod: S$GLB,,, | Performed by: INTERNAL MEDICINE

## 2020-12-29 PROCEDURE — 99214 PR OFFICE/OUTPT VISIT, EST, LEVL IV, 30-39 MIN: ICD-10-PCS | Mod: S$GLB,,, | Performed by: INTERNAL MEDICINE

## 2020-12-29 PROCEDURE — 3008F BODY MASS INDEX DOCD: CPT | Mod: CPTII,S$GLB,, | Performed by: INTERNAL MEDICINE

## 2020-12-29 PROCEDURE — 3078F DIAST BP <80 MM HG: CPT | Mod: CPTII,S$GLB,, | Performed by: INTERNAL MEDICINE

## 2020-12-29 PROCEDURE — 77067 SCR MAMMO BI INCL CAD: CPT | Mod: 26,,, | Performed by: RADIOLOGY

## 2020-12-29 PROCEDURE — 97110 THERAPEUTIC EXERCISES: CPT | Mod: PN

## 2020-12-29 PROCEDURE — 77067 SCR MAMMO BI INCL CAD: CPT | Mod: TC

## 2020-12-29 PROCEDURE — 3074F PR MOST RECENT SYSTOLIC BLOOD PRESSURE < 130 MM HG: ICD-10-PCS | Mod: CPTII,S$GLB,, | Performed by: INTERNAL MEDICINE

## 2020-12-29 PROCEDURE — 77063 BREAST TOMOSYNTHESIS BI: CPT | Mod: 26,,, | Performed by: RADIOLOGY

## 2020-12-29 PROCEDURE — 77067 MAMMO DIGITAL SCREENING BILAT WITH TOMO: ICD-10-PCS | Mod: 26,,, | Performed by: RADIOLOGY

## 2020-12-29 PROCEDURE — 1126F AMNT PAIN NOTED NONE PRSNT: CPT | Mod: S$GLB,,, | Performed by: INTERNAL MEDICINE

## 2020-12-29 RX ORDER — PANCRELIPASE 24000; 76000; 120000 [USP'U]/1; [USP'U]/1; [USP'U]/1
1 CAPSULE, DELAYED RELEASE PELLETS ORAL
Qty: 90 CAPSULE | Refills: 11 | Status: SHIPPED | OUTPATIENT
Start: 2020-12-29 | End: 2021-02-04

## 2020-12-29 NOTE — PROGRESS NOTES
Physical Therapy Daily Note     Name: Alessia Nelson  Clinic Number: [id number]  Diagnosis:   Encounter Diagnoses   Name Primary?    Cervical myofascial pain syndrome Yes    Chronic bilateral low back pain without sciatica      Physician: Lynne Parsons NP  Precautions: standard   Visit #:  8 of 8  PTA Visit #: 0  Time In: 12:15 pm   Time Out:  1:30 pm      Subjective     Pt reports:   Alessia reports   She has a slight headache today;  Fell asleep on the couch last night and thinks this is what caused her neck pain.   Pain Scale: Alessia rates pain on a scale of 0-10 to be 4 currently.    Objective     Alessia received individual therapeutic exercises to develop ROM, posture and core stabilization for 15 minutes including:    Serratus Lifts 2# x 20 - progress to 3#   Pullovers 6# x 20   S/L Left shoulder ER 2# x 15  Supine - PNF diagonal D2 flexion with Left/Right UE using YTB x 10 each  Thoracic rotation in side lying   Bridging   Piriformis stretch  Lumbar rolls   SLR  S/L hip abduction   Prone - alternate arm and leg   Prone - lumbar extension   Seated Lumbar flexion with SB     Nu-Step x 20 mins Level 5      Alessia received the following manual therapy techniques: Joint mobilizations and Soft tissue Mobilization were applied to the:Cervical/ Lumbar  spine for 25 minutes including:   S/L flexion to right / left lumbar spine; PROM right knee/hip in al planes; H/S stretching in all planes; Prone - lamina release and  paraspinal skin rolling ;  STM to quad mm ; Cervical manual traction with end range hold; STM left SCM and scalenes; sub-occipital inhibition.  STM right Ptyergoid to relieve TMJ pain;         Cervical Range of Motion:      Degrees Pain   Flexion 25 > 30 discomfort   Extension 30 > 31 discomfort   Right Rotation 42 > 50  +   Left Rotation 50 > 50 +    Right Side Bending 20 > 26 +   Left Side Bending 15 > 38 ++      Strength:     Left Right   DNF  "Unable to hold > 5 secs secondary to pain      Upper trap 4-/5 >  4/5 4-/5 > 4/5   Mid trap 4-/5 > 4+/5 4-/5 > 4/5   Lower trap 4-/5 > 4-/5 4-/5 > 4/5   Rhomboids 4-/5 4-/5 >     WFL          The patient received the following direct contact modalities after being cleared for contraindications:     The patient received the following supervised modalities after being cleared for contradictions:     Written Home Exercises Provided:   Alessia is performing her exercises above at home .   Pt demo good understanding of the education provided. Alessia demonstrated good return demonstration of activities.     Education provided re:  Alessia verbalized good understanding of education provided.   No spiritual or educational barriers to learning provided    Assessmentift     Patient tolerated treatment well today;  Able to perform exercises, Nu-step without difficulty; strength is equal right/left  Pain is gradually decreasing to point where it is "normal for Alessia; Her goal is to get her pain to less than 5 consistently - and she is there.  Discussed the need to find a program after PT in order to keep up her exercise - needs to be consistent or she will go back to pain, stiffness, decreased mobility within several months.   This is a 65 y.o. female referred to outpatient physical therapy and presents with a medical diagnosis of Myofascial cervical pain syndrome  and demonstrates limitations as described in the problem list. Pt prognosis is Good.  Patient is ready for discharge from Physical Therapy at this time.  She has reached maximum improvement at this level and is now independent with her exercises.  Emphasized need to keep up with all activity - stretching/strengthening and cardio in order to maintain her current improvement. Patient has achieved the goals she set for herself:   Patient's goals are to get off of her pain medicines completely (has been over a year since she has been able to go without pain meds/mm " relaxers. Wants her pain level to be at 5/10 or below    Goals as follows:    Long Term Goals: 6 weeks > Extend x 4 weeks : Goals achieve.   Pain: Decrease pain to no more than 4/10 to allow for improved ability to perform daily activities > Progress noted pain at 5-6/10 today - decreased from 9-10/10 on evaluation;   Strength: Improve strength in core muscles by 1/2 grade for improved cervgical stability - Progress noted see MMT chart above   ROM: Improve ROM in cervical spine by 10 degrees in all planes > Progress noted -  see cervical ROM above  Functional scale: Improve score on Neck Pain Index  to <40% disability - Progress noted: 42% limitation - decrease from 58% on eval.   Postures: Increase sitting and/or standing duration to 30 mins  without pain  Progress noted>variable - anywhere from 10 mins to actual 30 mins on a few occasions.   Headaches: decrease headache occurrence from daily to no more than 3x/week   > Progress noted decreased to 3-4/week   Sleep: Improve sleep - able to sleep up to 75% of normal 4 out of 7 days > Progress noted: getting over 50% but less than 75% of her normal sleep   Exercise: demonstrate independence with home exercise program to maintain gains made in therapy.          Plan     Discharge from Physical Therapy to [facility]      Therapist: Trinh Reyes, PT  12/29/2020

## 2021-01-04 PROBLEM — K86.89 PANCREATIC INSUFFICIENCY: Status: ACTIVE | Noted: 2021-01-04

## 2021-01-05 ENCOUNTER — TELEPHONE (OUTPATIENT)
Dept: PAIN MEDICINE | Facility: CLINIC | Age: 66
End: 2021-01-05

## 2021-01-05 ENCOUNTER — PATIENT MESSAGE (OUTPATIENT)
Dept: FAMILY MEDICINE | Facility: CLINIC | Age: 66
End: 2021-01-05

## 2021-01-05 NOTE — PROGRESS NOTES
Please let Ms Aggarwal know her mammogram was normal and Routine screening mammogram in 1 year is recommended.

## 2021-01-14 ENCOUNTER — OFFICE VISIT (OUTPATIENT)
Dept: PAIN MEDICINE | Facility: CLINIC | Age: 66
End: 2021-01-14
Payer: MEDICARE

## 2021-01-14 VITALS
SYSTOLIC BLOOD PRESSURE: 140 MMHG | BODY MASS INDEX: 27.27 KG/M2 | DIASTOLIC BLOOD PRESSURE: 66 MMHG | HEIGHT: 67 IN | HEART RATE: 55 BPM | TEMPERATURE: 99 F | RESPIRATION RATE: 19 BRPM | WEIGHT: 173.75 LBS

## 2021-01-14 DIAGNOSIS — M43.27 FUSION OF SPINE, LUMBOSACRAL REGION: ICD-10-CM

## 2021-01-14 DIAGNOSIS — M25.551 CHRONIC RIGHT HIP PAIN: ICD-10-CM

## 2021-01-14 DIAGNOSIS — M96.1 POSTLAMINECTOMY SYNDROME OF LUMBAR REGION: Primary | ICD-10-CM

## 2021-01-14 DIAGNOSIS — M48.07 SPINAL STENOSIS, LUMBOSACRAL REGION: ICD-10-CM

## 2021-01-14 DIAGNOSIS — M51.36 DDD (DEGENERATIVE DISC DISEASE), LUMBAR: ICD-10-CM

## 2021-01-14 DIAGNOSIS — G89.29 CHRONIC RIGHT HIP PAIN: ICD-10-CM

## 2021-01-14 DIAGNOSIS — M43.07 LUMBOSACRAL SPONDYLOLYSIS: ICD-10-CM

## 2021-01-14 PROCEDURE — 99999 PR PBB SHADOW E&M-EST. PATIENT-LVL III: ICD-10-PCS | Mod: PBBFAC,,, | Performed by: NURSE PRACTITIONER

## 2021-01-14 PROCEDURE — 3077F PR MOST RECENT SYSTOLIC BLOOD PRESSURE >= 140 MM HG: ICD-10-PCS | Mod: CPTII,S$GLB,, | Performed by: NURSE PRACTITIONER

## 2021-01-14 PROCEDURE — 99214 OFFICE O/P EST MOD 30 MIN: CPT | Mod: S$GLB,,, | Performed by: NURSE PRACTITIONER

## 2021-01-14 PROCEDURE — 3078F DIAST BP <80 MM HG: CPT | Mod: CPTII,S$GLB,, | Performed by: NURSE PRACTITIONER

## 2021-01-14 PROCEDURE — 3077F SYST BP >= 140 MM HG: CPT | Mod: CPTII,S$GLB,, | Performed by: NURSE PRACTITIONER

## 2021-01-14 PROCEDURE — 3078F PR MOST RECENT DIASTOLIC BLOOD PRESSURE < 80 MM HG: ICD-10-PCS | Mod: CPTII,S$GLB,, | Performed by: NURSE PRACTITIONER

## 2021-01-14 PROCEDURE — 1125F PR PAIN SEVERITY QUANTIFIED, PAIN PRESENT: ICD-10-PCS | Mod: S$GLB,,, | Performed by: NURSE PRACTITIONER

## 2021-01-14 PROCEDURE — 99214 PR OFFICE/OUTPT VISIT, EST, LEVL IV, 30-39 MIN: ICD-10-PCS | Mod: S$GLB,,, | Performed by: NURSE PRACTITIONER

## 2021-01-14 PROCEDURE — 3008F BODY MASS INDEX DOCD: CPT | Mod: CPTII,S$GLB,, | Performed by: NURSE PRACTITIONER

## 2021-01-14 PROCEDURE — 99999 PR PBB SHADOW E&M-EST. PATIENT-LVL III: CPT | Mod: PBBFAC,,, | Performed by: NURSE PRACTITIONER

## 2021-01-14 PROCEDURE — 3008F PR BODY MASS INDEX (BMI) DOCUMENTED: ICD-10-PCS | Mod: CPTII,S$GLB,, | Performed by: NURSE PRACTITIONER

## 2021-01-14 PROCEDURE — 1125F AMNT PAIN NOTED PAIN PRSNT: CPT | Mod: S$GLB,,, | Performed by: NURSE PRACTITIONER

## 2021-01-14 RX ORDER — MELOXICAM 15 MG/1
15 TABLET ORAL DAILY
Qty: 30 TABLET | Refills: 1 | Status: SHIPPED | OUTPATIENT
Start: 2021-01-14 | End: 2021-06-04

## 2021-01-14 RX ORDER — PREDNISONE 20 MG/1
20 TABLET ORAL 2 TIMES DAILY
Qty: 10 TABLET | Refills: 0 | Status: SHIPPED | OUTPATIENT
Start: 2021-01-14 | End: 2021-01-19

## 2021-01-25 ENCOUNTER — OFFICE VISIT (OUTPATIENT)
Dept: CARDIOLOGY | Facility: CLINIC | Age: 66
End: 2021-01-25
Payer: MEDICARE

## 2021-01-25 VITALS
WEIGHT: 179 LBS | OXYGEN SATURATION: 98 % | HEART RATE: 57 BPM | BODY MASS INDEX: 28.09 KG/M2 | SYSTOLIC BLOOD PRESSURE: 124 MMHG | HEIGHT: 67 IN | DIASTOLIC BLOOD PRESSURE: 70 MMHG

## 2021-01-25 DIAGNOSIS — K66.0 ABDOMINAL ADHESIONS: ICD-10-CM

## 2021-01-25 DIAGNOSIS — K29.70 GASTRITIS, PRESENCE OF BLEEDING UNSPECIFIED, UNSPECIFIED CHRONICITY, UNSPECIFIED GASTRITIS TYPE: ICD-10-CM

## 2021-01-25 DIAGNOSIS — K86.1 CHRONIC PANCREATITIS, UNSPECIFIED PANCREATITIS TYPE: ICD-10-CM

## 2021-01-25 DIAGNOSIS — K86.89 PANCREATIC INSUFFICIENCY: Primary | ICD-10-CM

## 2021-01-25 DIAGNOSIS — I25.10 ASCVD (ARTERIOSCLEROTIC CARDIOVASCULAR DISEASE): ICD-10-CM

## 2021-01-25 DIAGNOSIS — I10 ESSENTIAL HYPERTENSION: ICD-10-CM

## 2021-01-25 DIAGNOSIS — I10 HYPERTENSION, UNSPECIFIED TYPE: ICD-10-CM

## 2021-01-25 PROCEDURE — 3078F PR MOST RECENT DIASTOLIC BLOOD PRESSURE < 80 MM HG: ICD-10-PCS | Mod: CPTII,S$GLB,, | Performed by: SPECIALIST

## 2021-01-25 PROCEDURE — 3008F BODY MASS INDEX DOCD: CPT | Mod: CPTII,S$GLB,, | Performed by: SPECIALIST

## 2021-01-25 PROCEDURE — 3074F PR MOST RECENT SYSTOLIC BLOOD PRESSURE < 130 MM HG: ICD-10-PCS | Mod: CPTII,S$GLB,, | Performed by: SPECIALIST

## 2021-01-25 PROCEDURE — 3008F PR BODY MASS INDEX (BMI) DOCUMENTED: ICD-10-PCS | Mod: CPTII,S$GLB,, | Performed by: SPECIALIST

## 2021-01-25 PROCEDURE — 99214 PR OFFICE/OUTPT VISIT, EST, LEVL IV, 30-39 MIN: ICD-10-PCS | Mod: S$GLB,,, | Performed by: SPECIALIST

## 2021-01-25 PROCEDURE — 3078F DIAST BP <80 MM HG: CPT | Mod: CPTII,S$GLB,, | Performed by: SPECIALIST

## 2021-01-25 PROCEDURE — 3074F SYST BP LT 130 MM HG: CPT | Mod: CPTII,S$GLB,, | Performed by: SPECIALIST

## 2021-01-25 PROCEDURE — 99214 OFFICE O/P EST MOD 30 MIN: CPT | Mod: S$GLB,,, | Performed by: SPECIALIST

## 2021-01-25 RX ORDER — HYDROCHLOROTHIAZIDE 12.5 MG/1
12.5 TABLET ORAL DAILY
Qty: 90 TABLET | Refills: 3 | Status: SHIPPED | OUTPATIENT
Start: 2021-01-25 | End: 2021-11-29

## 2021-01-25 RX ORDER — LANOLIN ALCOHOL/MO/W.PET/CERES
400 CREAM (GRAM) TOPICAL DAILY
COMMUNITY
Start: 2021-01-25

## 2021-01-26 ENCOUNTER — HOSPITAL ENCOUNTER (OUTPATIENT)
Dept: RADIOLOGY | Facility: HOSPITAL | Age: 66
Discharge: HOME | End: 2021-01-26
Attending: NURSE PRACTITIONER
Payer: MEDICARE

## 2021-01-26 DIAGNOSIS — M25.551 CHRONIC RIGHT HIP PAIN: ICD-10-CM

## 2021-01-26 DIAGNOSIS — G89.29 CHRONIC RIGHT HIP PAIN: ICD-10-CM

## 2021-01-26 DIAGNOSIS — M43.27 FUSION OF SPINE, LUMBOSACRAL REGION: ICD-10-CM

## 2021-01-26 DIAGNOSIS — M48.07 SPINAL STENOSIS, LUMBOSACRAL REGION: ICD-10-CM

## 2021-01-26 PROCEDURE — 73502 X-RAY EXAM HIP UNI 2-3 VIEWS: CPT | Mod: 26,RT,, | Performed by: RADIOLOGY

## 2021-01-26 PROCEDURE — 73502 XR PELVIS 3 VIEW INC HIP 2 VIEW RIGHT: ICD-10-PCS | Mod: 26,RT,, | Performed by: RADIOLOGY

## 2021-01-26 PROCEDURE — 72148 MRI LUMBAR SPINE WITHOUT CONTRAST: ICD-10-PCS | Mod: 26,,, | Performed by: RADIOLOGY

## 2021-01-26 PROCEDURE — 72148 MRI LUMBAR SPINE W/O DYE: CPT | Mod: 26,,, | Performed by: RADIOLOGY

## 2021-01-26 PROCEDURE — 72148 MRI LUMBAR SPINE W/O DYE: CPT | Mod: TC,PN

## 2021-01-26 PROCEDURE — 73502 X-RAY EXAM HIP UNI 2-3 VIEWS: CPT | Mod: TC,PN,RT

## 2021-01-28 ENCOUNTER — TELEPHONE (OUTPATIENT)
Dept: PAIN MEDICINE | Facility: CLINIC | Age: 66
End: 2021-01-28

## 2021-01-29 DIAGNOSIS — I10 ESSENTIAL HYPERTENSION: ICD-10-CM

## 2021-01-29 RX ORDER — BENAZEPRIL HYDROCHLORIDE 20 MG/1
20 TABLET ORAL DAILY
Qty: 30 TABLET | Refills: 11 | Status: SHIPPED | OUTPATIENT
Start: 2021-01-29 | End: 2021-11-29 | Stop reason: ALTCHOICE

## 2021-02-03 ENCOUNTER — TELEPHONE (OUTPATIENT)
Dept: PAIN MEDICINE | Facility: CLINIC | Age: 66
End: 2021-02-03

## 2021-02-04 ENCOUNTER — TELEPHONE (OUTPATIENT)
Dept: PAIN MEDICINE | Facility: CLINIC | Age: 66
End: 2021-02-04

## 2021-02-04 ENCOUNTER — OFFICE VISIT (OUTPATIENT)
Dept: PAIN MEDICINE | Facility: CLINIC | Age: 66
End: 2021-02-04
Payer: MEDICARE

## 2021-02-04 VITALS
OXYGEN SATURATION: 96 % | TEMPERATURE: 98 F | BODY MASS INDEX: 27.23 KG/M2 | HEIGHT: 67 IN | DIASTOLIC BLOOD PRESSURE: 67 MMHG | WEIGHT: 173.5 LBS | SYSTOLIC BLOOD PRESSURE: 105 MMHG | HEART RATE: 60 BPM

## 2021-02-04 DIAGNOSIS — M79.18 MYOFASCIAL PAIN: ICD-10-CM

## 2021-02-04 DIAGNOSIS — M53.3 SACROILIAC JOINT PAIN: ICD-10-CM

## 2021-02-04 DIAGNOSIS — M48.07 SPINAL STENOSIS, LUMBOSACRAL REGION: Primary | ICD-10-CM

## 2021-02-04 DIAGNOSIS — M62.838 MUSCLE SPASMS OF BOTH LOWER EXTREMITIES: ICD-10-CM

## 2021-02-04 DIAGNOSIS — M96.1 POSTLAMINECTOMY SYNDROME OF LUMBAR REGION: ICD-10-CM

## 2021-02-04 DIAGNOSIS — M43.27 FUSION OF SPINE, LUMBOSACRAL REGION: ICD-10-CM

## 2021-02-04 DIAGNOSIS — M43.07 LUMBOSACRAL SPONDYLOLYSIS: ICD-10-CM

## 2021-02-04 PROCEDURE — 99999 PR PBB SHADOW E&M-EST. PATIENT-LVL IV: CPT | Mod: PBBFAC,,, | Performed by: NURSE PRACTITIONER

## 2021-02-04 PROCEDURE — 3078F DIAST BP <80 MM HG: CPT | Mod: CPTII,S$GLB,, | Performed by: NURSE PRACTITIONER

## 2021-02-04 PROCEDURE — 1125F AMNT PAIN NOTED PAIN PRSNT: CPT | Mod: S$GLB,,, | Performed by: NURSE PRACTITIONER

## 2021-02-04 PROCEDURE — 3074F SYST BP LT 130 MM HG: CPT | Mod: CPTII,S$GLB,, | Performed by: NURSE PRACTITIONER

## 2021-02-04 PROCEDURE — 3074F PR MOST RECENT SYSTOLIC BLOOD PRESSURE < 130 MM HG: ICD-10-PCS | Mod: CPTII,S$GLB,, | Performed by: NURSE PRACTITIONER

## 2021-02-04 PROCEDURE — 99214 PR OFFICE/OUTPT VISIT, EST, LEVL IV, 30-39 MIN: ICD-10-PCS | Mod: S$GLB,,, | Performed by: NURSE PRACTITIONER

## 2021-02-04 PROCEDURE — 3008F PR BODY MASS INDEX (BMI) DOCUMENTED: ICD-10-PCS | Mod: CPTII,S$GLB,, | Performed by: NURSE PRACTITIONER

## 2021-02-04 PROCEDURE — 99999 PR PBB SHADOW E&M-EST. PATIENT-LVL IV: ICD-10-PCS | Mod: PBBFAC,,, | Performed by: NURSE PRACTITIONER

## 2021-02-04 PROCEDURE — 3008F BODY MASS INDEX DOCD: CPT | Mod: CPTII,S$GLB,, | Performed by: NURSE PRACTITIONER

## 2021-02-04 PROCEDURE — 99214 OFFICE O/P EST MOD 30 MIN: CPT | Mod: S$GLB,,, | Performed by: NURSE PRACTITIONER

## 2021-02-04 PROCEDURE — 3078F PR MOST RECENT DIASTOLIC BLOOD PRESSURE < 80 MM HG: ICD-10-PCS | Mod: CPTII,S$GLB,, | Performed by: NURSE PRACTITIONER

## 2021-02-04 PROCEDURE — 1125F PR PAIN SEVERITY QUANTIFIED, PAIN PRESENT: ICD-10-PCS | Mod: S$GLB,,, | Performed by: NURSE PRACTITIONER

## 2021-02-04 RX ORDER — BACLOFEN 10 MG/1
10 TABLET ORAL 2 TIMES DAILY
Qty: 180 TABLET | Refills: 1 | Status: SHIPPED | OUTPATIENT
Start: 2021-02-04 | End: 2021-05-19

## 2021-02-04 RX ORDER — HYDROCODONE BITARTRATE AND ACETAMINOPHEN 7.5; 325 MG/1; MG/1
1 TABLET ORAL 2 TIMES DAILY PRN
Qty: 60 TABLET | Refills: 0 | Status: SHIPPED | OUTPATIENT
Start: 2021-02-04 | End: 2021-03-08

## 2021-02-09 ENCOUNTER — TELEPHONE (OUTPATIENT)
Dept: PAIN MEDICINE | Facility: CLINIC | Age: 66
End: 2021-02-09

## 2021-02-10 ENCOUNTER — TELEPHONE (OUTPATIENT)
Dept: PAIN MEDICINE | Facility: CLINIC | Age: 66
End: 2021-02-10

## 2021-02-11 DIAGNOSIS — M47.817 SPONDYLOSIS OF LUMBOSACRAL REGION WITHOUT MYELOPATHY OR RADICULOPATHY: Primary | ICD-10-CM

## 2021-02-13 ENCOUNTER — LAB VISIT (OUTPATIENT)
Dept: FAMILY MEDICINE | Facility: CLINIC | Age: 66
End: 2021-02-13
Payer: MEDICARE

## 2021-02-13 DIAGNOSIS — Z01.818 PRE-OP TESTING: ICD-10-CM

## 2021-02-13 PROCEDURE — U0003 INFECTIOUS AGENT DETECTION BY NUCLEIC ACID (DNA OR RNA); SEVERE ACUTE RESPIRATORY SYNDROME CORONAVIRUS 2 (SARS-COV-2) (CORONAVIRUS DISEASE [COVID-19]), AMPLIFIED PROBE TECHNIQUE, MAKING USE OF HIGH THROUGHPUT TECHNOLOGIES AS DESCRIBED BY CMS-2020-01-R: HCPCS

## 2021-02-13 PROCEDURE — U0005 INFEC AGEN DETEC AMPLI PROBE: HCPCS

## 2021-02-14 LAB — SARS-COV-2 RNA RESP QL NAA+PROBE: NOT DETECTED

## 2021-02-15 ENCOUNTER — TELEPHONE (OUTPATIENT)
Dept: PAIN MEDICINE | Facility: CLINIC | Age: 66
End: 2021-02-15

## 2021-02-18 ENCOUNTER — TELEPHONE (OUTPATIENT)
Dept: PAIN MEDICINE | Facility: CLINIC | Age: 66
End: 2021-02-18

## 2021-02-18 DIAGNOSIS — Z01.818 PRE-OP TESTING: ICD-10-CM

## 2021-02-24 ENCOUNTER — TELEPHONE (OUTPATIENT)
Dept: PAIN MEDICINE | Facility: CLINIC | Age: 66
End: 2021-02-24

## 2021-02-26 ENCOUNTER — LAB VISIT (OUTPATIENT)
Dept: FAMILY MEDICINE | Facility: CLINIC | Age: 66
End: 2021-02-26
Payer: MEDICARE

## 2021-02-26 DIAGNOSIS — Z01.818 PRE-OP TESTING: ICD-10-CM

## 2021-02-26 LAB — SARS-COV-2 RNA RESP QL NAA+PROBE: NOT DETECTED

## 2021-02-26 PROCEDURE — U0005 INFEC AGEN DETEC AMPLI PROBE: HCPCS

## 2021-02-26 PROCEDURE — U0003 INFECTIOUS AGENT DETECTION BY NUCLEIC ACID (DNA OR RNA); SEVERE ACUTE RESPIRATORY SYNDROME CORONAVIRUS 2 (SARS-COV-2) (CORONAVIRUS DISEASE [COVID-19]), AMPLIFIED PROBE TECHNIQUE, MAKING USE OF HIGH THROUGHPUT TECHNOLOGIES AS DESCRIBED BY CMS-2020-01-R: HCPCS

## 2021-03-01 ENCOUNTER — HOSPITAL ENCOUNTER (OUTPATIENT)
Facility: AMBULARY SURGERY CENTER | Age: 66
Discharge: HOME | End: 2021-03-01
Attending: ANESTHESIOLOGY | Admitting: ANESTHESIOLOGY
Payer: MEDICARE

## 2021-03-01 DIAGNOSIS — M51.36 DEGENERATIVE DISC DISEASE, LUMBAR: Primary | ICD-10-CM

## 2021-03-01 PROBLEM — M51.369 DEGENERATIVE DISC DISEASE, LUMBAR: Status: ACTIVE | Noted: 2021-03-01

## 2021-03-01 PROCEDURE — 62323 NJX INTERLAMINAR LMBR/SAC: CPT | Performed by: ANESTHESIOLOGY

## 2021-03-01 PROCEDURE — 62323 NJX INTERLAMINAR LMBR/SAC: CPT | Mod: ,,, | Performed by: ANESTHESIOLOGY

## 2021-03-01 PROCEDURE — 62323 PR INJ LUMBAR/SACRAL, W/IMAGING GUIDANCE: ICD-10-PCS | Mod: ,,, | Performed by: ANESTHESIOLOGY

## 2021-03-01 RX ORDER — MIDAZOLAM HYDROCHLORIDE 2 MG/2ML
INJECTION, SOLUTION INTRAMUSCULAR; INTRAVENOUS
Status: DISCONTINUED | OUTPATIENT
Start: 2021-03-01 | End: 2021-03-01 | Stop reason: HOSPADM

## 2021-03-01 RX ORDER — FENTANYL CITRATE 50 UG/ML
INJECTION, SOLUTION INTRAMUSCULAR; INTRAVENOUS
Status: DISCONTINUED | OUTPATIENT
Start: 2021-03-01 | End: 2021-03-01 | Stop reason: HOSPADM

## 2021-03-01 RX ORDER — LIDOCAINE HYDROCHLORIDE 10 MG/ML
INJECTION, SOLUTION EPIDURAL; INFILTRATION; INTRACAUDAL; PERINEURAL
Status: DISCONTINUED | OUTPATIENT
Start: 2021-03-01 | End: 2021-03-01 | Stop reason: HOSPADM

## 2021-03-01 RX ORDER — DEXAMETHASONE SODIUM PHOSPHATE 10 MG/ML
INJECTION INTRAMUSCULAR; INTRAVENOUS
Status: DISCONTINUED | OUTPATIENT
Start: 2021-03-01 | End: 2021-03-01 | Stop reason: HOSPADM

## 2021-03-01 RX ORDER — SODIUM CHLORIDE, SODIUM LACTATE, POTASSIUM CHLORIDE, CALCIUM CHLORIDE 600; 310; 30; 20 MG/100ML; MG/100ML; MG/100ML; MG/100ML
INJECTION, SOLUTION INTRAVENOUS ONCE AS NEEDED
Status: COMPLETED | OUTPATIENT
Start: 2021-03-01 | End: 2021-03-01

## 2021-03-01 RX ORDER — SODIUM CHLORIDE 9 MG/ML
INJECTION, SOLUTION INTRAMUSCULAR; INTRAVENOUS; SUBCUTANEOUS
Status: DISCONTINUED | OUTPATIENT
Start: 2021-03-01 | End: 2021-03-01 | Stop reason: HOSPADM

## 2021-03-01 RX ADMIN — SODIUM CHLORIDE, SODIUM LACTATE, POTASSIUM CHLORIDE, CALCIUM CHLORIDE: 600; 310; 30; 20 INJECTION, SOLUTION INTRAVENOUS at 01:03

## 2021-03-02 VITALS
OXYGEN SATURATION: 94 % | TEMPERATURE: 98 F | HEIGHT: 67 IN | RESPIRATION RATE: 18 BRPM | WEIGHT: 173 LBS | BODY MASS INDEX: 27.15 KG/M2 | DIASTOLIC BLOOD PRESSURE: 53 MMHG | HEART RATE: 60 BPM | SYSTOLIC BLOOD PRESSURE: 106 MMHG

## 2021-03-08 ENCOUNTER — OFFICE VISIT (OUTPATIENT)
Dept: PAIN MEDICINE | Facility: CLINIC | Age: 66
End: 2021-03-08
Payer: MEDICARE

## 2021-03-08 ENCOUNTER — TELEPHONE (OUTPATIENT)
Dept: NEUROSURGERY | Facility: CLINIC | Age: 66
End: 2021-03-08

## 2021-03-08 VITALS
OXYGEN SATURATION: 96 % | HEIGHT: 67 IN | SYSTOLIC BLOOD PRESSURE: 133 MMHG | TEMPERATURE: 99 F | WEIGHT: 177.69 LBS | HEART RATE: 71 BPM | DIASTOLIC BLOOD PRESSURE: 79 MMHG | BODY MASS INDEX: 27.89 KG/M2 | RESPIRATION RATE: 16 BRPM

## 2021-03-08 DIAGNOSIS — M23.203 DEGENERATIVE TEAR OF MEDIAL MENISCUS OF RIGHT KNEE: ICD-10-CM

## 2021-03-08 DIAGNOSIS — G89.29 CHRONIC RIGHT HIP PAIN: ICD-10-CM

## 2021-03-08 DIAGNOSIS — M70.61 GREATER TROCHANTERIC BURSITIS OF RIGHT HIP: ICD-10-CM

## 2021-03-08 DIAGNOSIS — M96.1 POSTLAMINECTOMY SYNDROME OF LUMBAR REGION: ICD-10-CM

## 2021-03-08 DIAGNOSIS — M51.36 DDD (DEGENERATIVE DISC DISEASE), LUMBAR: ICD-10-CM

## 2021-03-08 DIAGNOSIS — M25.551 CHRONIC RIGHT HIP PAIN: ICD-10-CM

## 2021-03-08 DIAGNOSIS — M43.6 TORTICOLLIS: ICD-10-CM

## 2021-03-08 DIAGNOSIS — M48.07 SPINAL STENOSIS, LUMBOSACRAL REGION: Primary | ICD-10-CM

## 2021-03-08 DIAGNOSIS — M53.3 SACROILIAC JOINT PAIN: ICD-10-CM

## 2021-03-08 DIAGNOSIS — M43.27 FUSION OF SPINE, LUMBOSACRAL REGION: ICD-10-CM

## 2021-03-08 DIAGNOSIS — Z79.891 OPIOID CONTRACT EXISTS: ICD-10-CM

## 2021-03-08 DIAGNOSIS — M43.07 LUMBOSACRAL SPONDYLOLYSIS: ICD-10-CM

## 2021-03-08 DIAGNOSIS — Z96.651 STATUS POST TOTAL RIGHT KNEE REPLACEMENT: ICD-10-CM

## 2021-03-08 PROCEDURE — 99999 PR PBB SHADOW E&M-EST. PATIENT-LVL V: ICD-10-PCS | Mod: PBBFAC,,, | Performed by: NURSE PRACTITIONER

## 2021-03-08 PROCEDURE — 3078F PR MOST RECENT DIASTOLIC BLOOD PRESSURE < 80 MM HG: ICD-10-PCS | Mod: CPTII,S$GLB,, | Performed by: NURSE PRACTITIONER

## 2021-03-08 PROCEDURE — 3008F BODY MASS INDEX DOCD: CPT | Mod: CPTII,S$GLB,, | Performed by: NURSE PRACTITIONER

## 2021-03-08 PROCEDURE — 3075F SYST BP GE 130 - 139MM HG: CPT | Mod: CPTII,S$GLB,, | Performed by: NURSE PRACTITIONER

## 2021-03-08 PROCEDURE — 99999 PR PBB SHADOW E&M-EST. PATIENT-LVL V: CPT | Mod: PBBFAC,,, | Performed by: NURSE PRACTITIONER

## 2021-03-08 PROCEDURE — 3008F PR BODY MASS INDEX (BMI) DOCUMENTED: ICD-10-PCS | Mod: CPTII,S$GLB,, | Performed by: NURSE PRACTITIONER

## 2021-03-08 PROCEDURE — 99214 PR OFFICE/OUTPT VISIT, EST, LEVL IV, 30-39 MIN: ICD-10-PCS | Mod: S$GLB,,, | Performed by: NURSE PRACTITIONER

## 2021-03-08 PROCEDURE — 1125F AMNT PAIN NOTED PAIN PRSNT: CPT | Mod: S$GLB,,, | Performed by: NURSE PRACTITIONER

## 2021-03-08 PROCEDURE — 3075F PR MOST RECENT SYSTOLIC BLOOD PRESS GE 130-139MM HG: ICD-10-PCS | Mod: CPTII,S$GLB,, | Performed by: NURSE PRACTITIONER

## 2021-03-08 PROCEDURE — 99214 OFFICE O/P EST MOD 30 MIN: CPT | Mod: S$GLB,,, | Performed by: NURSE PRACTITIONER

## 2021-03-08 PROCEDURE — 3078F DIAST BP <80 MM HG: CPT | Mod: CPTII,S$GLB,, | Performed by: NURSE PRACTITIONER

## 2021-03-08 PROCEDURE — 1125F PR PAIN SEVERITY QUANTIFIED, PAIN PRESENT: ICD-10-PCS | Mod: S$GLB,,, | Performed by: NURSE PRACTITIONER

## 2021-03-08 RX ORDER — HYDROCODONE BITARTRATE AND ACETAMINOPHEN 10; 325 MG/1; MG/1
1 TABLET ORAL EVERY 8 HOURS PRN
Qty: 90 TABLET | Refills: 0 | Status: SHIPPED | OUTPATIENT
Start: 2021-03-08 | End: 2021-04-16

## 2021-03-09 ENCOUNTER — TELEPHONE (OUTPATIENT)
Dept: NEUROSURGERY | Facility: CLINIC | Age: 66
End: 2021-03-09

## 2021-03-09 ENCOUNTER — TELEPHONE (OUTPATIENT)
Dept: PAIN MEDICINE | Facility: CLINIC | Age: 66
End: 2021-03-09

## 2021-03-10 ENCOUNTER — HOSPITAL ENCOUNTER (OUTPATIENT)
Dept: RADIOLOGY | Facility: HOSPITAL | Age: 66
Discharge: HOME | End: 2021-03-10
Attending: NURSE PRACTITIONER
Payer: MEDICARE

## 2021-03-10 DIAGNOSIS — R92.8 ABNORMAL MAMMOGRAM OF RIGHT BREAST: ICD-10-CM

## 2021-03-10 DIAGNOSIS — N63.10 MASS OF RIGHT BREAST ON MAMMOGRAM: ICD-10-CM

## 2021-03-10 PROCEDURE — 76642 US BREAST RIGHT LIMITED: ICD-10-PCS | Mod: 26,RT,, | Performed by: RADIOLOGY

## 2021-03-10 PROCEDURE — 77065 DX MAMMO INCL CAD UNI: CPT | Mod: 26,RT,, | Performed by: RADIOLOGY

## 2021-03-10 PROCEDURE — 77061 BREAST TOMOSYNTHESIS UNI: CPT | Mod: 26,RT,, | Performed by: RADIOLOGY

## 2021-03-10 PROCEDURE — 76642 ULTRASOUND BREAST LIMITED: CPT | Mod: TC,RT

## 2021-03-10 PROCEDURE — 77061 BREAST TOMOSYNTHESIS UNI: CPT | Mod: TC,RT

## 2021-03-10 PROCEDURE — 77065 MAMMO DIGITAL DIAGNOSTIC RIGHT WITH TOMO: ICD-10-PCS | Mod: 26,RT,, | Performed by: RADIOLOGY

## 2021-03-10 PROCEDURE — 76642 ULTRASOUND BREAST LIMITED: CPT | Mod: 26,RT,, | Performed by: RADIOLOGY

## 2021-03-10 PROCEDURE — 77061 MAMMO DIGITAL DIAGNOSTIC RIGHT WITH TOMO: ICD-10-PCS | Mod: 26,RT,, | Performed by: RADIOLOGY

## 2021-03-11 ENCOUNTER — PATIENT MESSAGE (OUTPATIENT)
Dept: FAMILY MEDICINE | Facility: CLINIC | Age: 66
End: 2021-03-11

## 2021-03-17 ENCOUNTER — TELEPHONE (OUTPATIENT)
Dept: FAMILY MEDICINE | Facility: CLINIC | Age: 66
End: 2021-03-17

## 2021-03-17 ENCOUNTER — OFFICE VISIT (OUTPATIENT)
Dept: NEUROSURGERY | Facility: CLINIC | Age: 66
End: 2021-03-17
Payer: MEDICARE

## 2021-03-17 VITALS
HEART RATE: 57 BPM | HEIGHT: 67 IN | WEIGHT: 177.69 LBS | RESPIRATION RATE: 16 BRPM | SYSTOLIC BLOOD PRESSURE: 114 MMHG | DIASTOLIC BLOOD PRESSURE: 71 MMHG | BODY MASS INDEX: 27.89 KG/M2

## 2021-03-17 DIAGNOSIS — K52.9 CHRONIC DIARRHEA: ICD-10-CM

## 2021-03-17 DIAGNOSIS — K21.9 GASTROESOPHAGEAL REFLUX DISEASE, UNSPECIFIED WHETHER ESOPHAGITIS PRESENT: Primary | ICD-10-CM

## 2021-03-17 DIAGNOSIS — M43.16 LUMBAR ADJACENT SEGMENT DISEASE WITH SPONDYLOLISTHESIS: ICD-10-CM

## 2021-03-17 DIAGNOSIS — Z98.1 HISTORY OF SPINAL FUSION: Primary | ICD-10-CM

## 2021-03-17 DIAGNOSIS — M54.9 DORSALGIA, UNSPECIFIED: ICD-10-CM

## 2021-03-17 DIAGNOSIS — M51.36 LUMBAR ADJACENT SEGMENT DISEASE WITH SPONDYLOLISTHESIS: ICD-10-CM

## 2021-03-17 PROCEDURE — 3078F DIAST BP <80 MM HG: CPT | Mod: CPTII,S$GLB,, | Performed by: STUDENT IN AN ORGANIZED HEALTH CARE EDUCATION/TRAINING PROGRAM

## 2021-03-17 PROCEDURE — 1125F PR PAIN SEVERITY QUANTIFIED, PAIN PRESENT: ICD-10-PCS | Mod: S$GLB,,, | Performed by: STUDENT IN AN ORGANIZED HEALTH CARE EDUCATION/TRAINING PROGRAM

## 2021-03-17 PROCEDURE — 3074F PR MOST RECENT SYSTOLIC BLOOD PRESSURE < 130 MM HG: ICD-10-PCS | Mod: CPTII,S$GLB,, | Performed by: STUDENT IN AN ORGANIZED HEALTH CARE EDUCATION/TRAINING PROGRAM

## 2021-03-17 PROCEDURE — 99214 OFFICE O/P EST MOD 30 MIN: CPT | Mod: S$GLB,,, | Performed by: STUDENT IN AN ORGANIZED HEALTH CARE EDUCATION/TRAINING PROGRAM

## 2021-03-17 PROCEDURE — 3008F BODY MASS INDEX DOCD: CPT | Mod: CPTII,S$GLB,, | Performed by: STUDENT IN AN ORGANIZED HEALTH CARE EDUCATION/TRAINING PROGRAM

## 2021-03-17 PROCEDURE — 3008F PR BODY MASS INDEX (BMI) DOCUMENTED: ICD-10-PCS | Mod: CPTII,S$GLB,, | Performed by: STUDENT IN AN ORGANIZED HEALTH CARE EDUCATION/TRAINING PROGRAM

## 2021-03-17 PROCEDURE — 99214 PR OFFICE/OUTPT VISIT, EST, LEVL IV, 30-39 MIN: ICD-10-PCS | Mod: S$GLB,,, | Performed by: STUDENT IN AN ORGANIZED HEALTH CARE EDUCATION/TRAINING PROGRAM

## 2021-03-17 PROCEDURE — 3078F PR MOST RECENT DIASTOLIC BLOOD PRESSURE < 80 MM HG: ICD-10-PCS | Mod: CPTII,S$GLB,, | Performed by: STUDENT IN AN ORGANIZED HEALTH CARE EDUCATION/TRAINING PROGRAM

## 2021-03-17 PROCEDURE — 99999 PR PBB SHADOW E&M-EST. PATIENT-LVL IV: ICD-10-PCS | Mod: PBBFAC,,, | Performed by: STUDENT IN AN ORGANIZED HEALTH CARE EDUCATION/TRAINING PROGRAM

## 2021-03-17 PROCEDURE — 1125F AMNT PAIN NOTED PAIN PRSNT: CPT | Mod: S$GLB,,, | Performed by: STUDENT IN AN ORGANIZED HEALTH CARE EDUCATION/TRAINING PROGRAM

## 2021-03-17 PROCEDURE — 99999 PR PBB SHADOW E&M-EST. PATIENT-LVL IV: CPT | Mod: PBBFAC,,, | Performed by: STUDENT IN AN ORGANIZED HEALTH CARE EDUCATION/TRAINING PROGRAM

## 2021-03-17 PROCEDURE — 3074F SYST BP LT 130 MM HG: CPT | Mod: CPTII,S$GLB,, | Performed by: STUDENT IN AN ORGANIZED HEALTH CARE EDUCATION/TRAINING PROGRAM

## 2021-03-18 ENCOUNTER — IMMUNIZATION (OUTPATIENT)
Dept: FAMILY MEDICINE | Facility: CLINIC | Age: 66
End: 2021-03-18
Payer: MEDICARE

## 2021-03-18 DIAGNOSIS — Z23 NEED FOR VACCINATION: Primary | ICD-10-CM

## 2021-03-18 PROCEDURE — 91301 COVID-19, MRNA, LNP-S, PF, 100 MCG/0.5 ML DOSE VACCINE: CPT | Mod: S$GLB,,, | Performed by: FAMILY MEDICINE

## 2021-03-18 PROCEDURE — 0011A COVID-19, MRNA, LNP-S, PF, 100 MCG/0.5 ML DOSE VACCINE: CPT | Mod: CV19,S$GLB,, | Performed by: FAMILY MEDICINE

## 2021-03-18 PROCEDURE — 91301 COVID-19, MRNA, LNP-S, PF, 100 MCG/0.5 ML DOSE VACCINE: ICD-10-PCS | Mod: S$GLB,,, | Performed by: FAMILY MEDICINE

## 2021-03-18 PROCEDURE — 0011A COVID-19, MRNA, LNP-S, PF, 100 MCG/0.5 ML DOSE VACCINE: ICD-10-PCS | Mod: CV19,S$GLB,, | Performed by: FAMILY MEDICINE

## 2021-03-23 ENCOUNTER — TELEPHONE (OUTPATIENT)
Dept: NEUROSURGERY | Facility: CLINIC | Age: 66
End: 2021-03-23

## 2021-03-25 ENCOUNTER — HOSPITAL ENCOUNTER (OUTPATIENT)
Dept: RADIOLOGY | Facility: HOSPITAL | Age: 66
Discharge: HOME | End: 2021-03-25
Attending: STUDENT IN AN ORGANIZED HEALTH CARE EDUCATION/TRAINING PROGRAM
Payer: MEDICARE

## 2021-03-25 DIAGNOSIS — M43.16 LUMBAR ADJACENT SEGMENT DISEASE WITH SPONDYLOLISTHESIS: ICD-10-CM

## 2021-03-25 DIAGNOSIS — Z98.1 HISTORY OF SPINAL FUSION: ICD-10-CM

## 2021-03-25 DIAGNOSIS — M51.36 LUMBAR ADJACENT SEGMENT DISEASE WITH SPONDYLOLISTHESIS: ICD-10-CM

## 2021-03-25 DIAGNOSIS — M54.9 DORSALGIA, UNSPECIFIED: ICD-10-CM

## 2021-03-25 PROCEDURE — 72120 X-RAY BEND ONLY L-S SPINE: CPT | Mod: TC,PN

## 2021-03-25 PROCEDURE — 72100 X-RAY EXAM L-S SPINE 2/3 VWS: CPT | Mod: 26,,, | Performed by: RADIOLOGY

## 2021-03-25 PROCEDURE — 72131 CT LUMBAR SPINE W/O DYE: CPT | Mod: 26,,, | Performed by: RADIOLOGY

## 2021-03-25 PROCEDURE — 72100 XR LUMBAR SPINE AP AND LAT WITH FLEX/EXT: ICD-10-PCS | Mod: 26,,, | Performed by: RADIOLOGY

## 2021-03-25 PROCEDURE — 72120 X-RAY BEND ONLY L-S SPINE: CPT | Mod: 26,,, | Performed by: RADIOLOGY

## 2021-03-25 PROCEDURE — 72131 CT LUMBAR SPINE WITHOUT CONTRAST: ICD-10-PCS | Mod: 26,,, | Performed by: RADIOLOGY

## 2021-03-25 PROCEDURE — 72120 XR LUMBAR SPINE AP AND LAT WITH FLEX/EXT: ICD-10-PCS | Mod: 26,,, | Performed by: RADIOLOGY

## 2021-03-25 PROCEDURE — 72131 CT LUMBAR SPINE W/O DYE: CPT | Mod: TC,PN

## 2021-04-01 ENCOUNTER — TELEPHONE (OUTPATIENT)
Dept: NEUROSURGERY | Facility: CLINIC | Age: 66
End: 2021-04-01

## 2021-04-06 ENCOUNTER — TELEPHONE (OUTPATIENT)
Dept: NEUROSURGERY | Facility: CLINIC | Age: 66
End: 2021-04-06

## 2021-04-07 ENCOUNTER — OFFICE VISIT (OUTPATIENT)
Dept: PAIN MEDICINE | Facility: CLINIC | Age: 66
End: 2021-04-07
Payer: MEDICARE

## 2021-04-07 ENCOUNTER — TELEPHONE (OUTPATIENT)
Dept: PAIN MEDICINE | Facility: CLINIC | Age: 66
End: 2021-04-07

## 2021-04-07 VITALS
BODY MASS INDEX: 27.78 KG/M2 | DIASTOLIC BLOOD PRESSURE: 68 MMHG | HEIGHT: 67 IN | WEIGHT: 177 LBS | SYSTOLIC BLOOD PRESSURE: 107 MMHG | TEMPERATURE: 99 F | OXYGEN SATURATION: 98 % | HEART RATE: 68 BPM

## 2021-04-07 DIAGNOSIS — F11.90 CHRONIC, CONTINUOUS USE OF OPIOIDS: ICD-10-CM

## 2021-04-07 DIAGNOSIS — M48.07 SPINAL STENOSIS, LUMBOSACRAL REGION: ICD-10-CM

## 2021-04-07 DIAGNOSIS — M51.36 DDD (DEGENERATIVE DISC DISEASE), LUMBAR: ICD-10-CM

## 2021-04-07 DIAGNOSIS — M96.1 POSTLAMINECTOMY SYNDROME OF LUMBAR REGION: Primary | ICD-10-CM

## 2021-04-07 DIAGNOSIS — M43.27 FUSION OF SPINE, LUMBOSACRAL REGION: ICD-10-CM

## 2021-04-07 DIAGNOSIS — M50.30 DDD (DEGENERATIVE DISC DISEASE), CERVICAL: ICD-10-CM

## 2021-04-07 PROCEDURE — 1125F PR PAIN SEVERITY QUANTIFIED, PAIN PRESENT: ICD-10-PCS | Mod: S$GLB,,, | Performed by: ANESTHESIOLOGY

## 2021-04-07 PROCEDURE — 99214 PR OFFICE/OUTPT VISIT, EST, LEVL IV, 30-39 MIN: ICD-10-PCS | Mod: S$GLB,,, | Performed by: ANESTHESIOLOGY

## 2021-04-07 PROCEDURE — 80307 DRUG TEST PRSMV CHEM ANLYZR: CPT | Performed by: ANESTHESIOLOGY

## 2021-04-07 PROCEDURE — 3008F PR BODY MASS INDEX (BMI) DOCUMENTED: ICD-10-PCS | Mod: CPTII,S$GLB,, | Performed by: ANESTHESIOLOGY

## 2021-04-07 PROCEDURE — 1125F AMNT PAIN NOTED PAIN PRSNT: CPT | Mod: S$GLB,,, | Performed by: ANESTHESIOLOGY

## 2021-04-07 PROCEDURE — 99999 PR PBB SHADOW E&M-EST. PATIENT-LVL V: ICD-10-PCS | Mod: PBBFAC,,, | Performed by: ANESTHESIOLOGY

## 2021-04-07 PROCEDURE — 3008F BODY MASS INDEX DOCD: CPT | Mod: CPTII,S$GLB,, | Performed by: ANESTHESIOLOGY

## 2021-04-07 PROCEDURE — 99214 OFFICE O/P EST MOD 30 MIN: CPT | Mod: S$GLB,,, | Performed by: ANESTHESIOLOGY

## 2021-04-07 PROCEDURE — 99999 PR PBB SHADOW E&M-EST. PATIENT-LVL V: CPT | Mod: PBBFAC,,, | Performed by: ANESTHESIOLOGY

## 2021-04-08 ENCOUNTER — LAB VISIT (OUTPATIENT)
Dept: LAB | Facility: HOSPITAL | Age: 66
End: 2021-04-08
Attending: NURSE PRACTITIONER
Payer: MEDICARE

## 2021-04-08 ENCOUNTER — OFFICE VISIT (OUTPATIENT)
Dept: GASTROENTEROLOGY | Facility: CLINIC | Age: 66
End: 2021-04-08
Payer: MEDICARE

## 2021-04-08 VITALS — WEIGHT: 174.81 LBS | HEIGHT: 67 IN | BODY MASS INDEX: 27.44 KG/M2

## 2021-04-08 DIAGNOSIS — K86.1 CHRONIC PANCREATITIS, UNSPECIFIED PANCREATITIS TYPE: ICD-10-CM

## 2021-04-08 DIAGNOSIS — K52.9 CHRONIC DIARRHEA: ICD-10-CM

## 2021-04-08 DIAGNOSIS — Z90.49 HISTORY OF PARTIAL SURGICAL REMOVAL OF COLON: ICD-10-CM

## 2021-04-08 DIAGNOSIS — R15.2 INCONTINENCE OF FECES WITH FECAL URGENCY: ICD-10-CM

## 2021-04-08 DIAGNOSIS — R15.9 INCONTINENCE OF FECES WITH FECAL URGENCY: ICD-10-CM

## 2021-04-08 DIAGNOSIS — K52.9 CHRONIC DIARRHEA: Primary | ICD-10-CM

## 2021-04-08 DIAGNOSIS — Z90.49 S/P CHOLECYSTECTOMY: ICD-10-CM

## 2021-04-08 DIAGNOSIS — K21.9 GASTROESOPHAGEAL REFLUX DISEASE, UNSPECIFIED WHETHER ESOPHAGITIS PRESENT: ICD-10-CM

## 2021-04-08 DIAGNOSIS — R10.9 RIGHT SIDED ABDOMINAL PAIN: ICD-10-CM

## 2021-04-08 DIAGNOSIS — R10.13 EPIGASTRIC PAIN: ICD-10-CM

## 2021-04-08 DIAGNOSIS — Z79.01 CURRENT USE OF ANTICOAGULANT THERAPY: ICD-10-CM

## 2021-04-08 LAB
C DIFF GDH STL QL: POSITIVE
C DIFF TOX A+B STL QL IA: POSITIVE
OB PNL STL: NEGATIVE
WBC #/AREA STL HPF: NORMAL /[HPF]

## 2021-04-08 PROCEDURE — 3008F PR BODY MASS INDEX (BMI) DOCUMENTED: ICD-10-PCS | Mod: CPTII,S$GLB,, | Performed by: NURSE PRACTITIONER

## 2021-04-08 PROCEDURE — 87046 STOOL CULTR AEROBIC BACT EA: CPT | Performed by: NURSE PRACTITIONER

## 2021-04-08 PROCEDURE — 82272 OCCULT BLD FECES 1-3 TESTS: CPT | Performed by: NURSE PRACTITIONER

## 2021-04-08 PROCEDURE — 87427 SHIGA-LIKE TOXIN AG IA: CPT | Performed by: NURSE PRACTITIONER

## 2021-04-08 PROCEDURE — 99999 PR PBB SHADOW E&M-EST. PATIENT-LVL V: CPT | Mod: PBBFAC,,, | Performed by: NURSE PRACTITIONER

## 2021-04-08 PROCEDURE — 87324 CLOSTRIDIUM AG IA: CPT | Performed by: NURSE PRACTITIONER

## 2021-04-08 PROCEDURE — 87329 GIARDIA AG IA: CPT | Performed by: NURSE PRACTITIONER

## 2021-04-08 PROCEDURE — 87209 SMEAR COMPLEX STAIN: CPT | Performed by: NURSE PRACTITIONER

## 2021-04-08 PROCEDURE — 1126F PR PAIN SEVERITY QUANTIFIED, NO PAIN PRESENT: ICD-10-PCS | Mod: S$GLB,,, | Performed by: NURSE PRACTITIONER

## 2021-04-08 PROCEDURE — 87449 NOS EACH ORGANISM AG IA: CPT | Performed by: NURSE PRACTITIONER

## 2021-04-08 PROCEDURE — 1126F AMNT PAIN NOTED NONE PRSNT: CPT | Mod: S$GLB,,, | Performed by: NURSE PRACTITIONER

## 2021-04-08 PROCEDURE — 99214 PR OFFICE/OUTPT VISIT, EST, LEVL IV, 30-39 MIN: ICD-10-PCS | Mod: S$GLB,,, | Performed by: NURSE PRACTITIONER

## 2021-04-08 PROCEDURE — 99214 OFFICE O/P EST MOD 30 MIN: CPT | Mod: S$GLB,,, | Performed by: NURSE PRACTITIONER

## 2021-04-08 PROCEDURE — 89055 LEUKOCYTE ASSESSMENT FECAL: CPT | Performed by: NURSE PRACTITIONER

## 2021-04-08 PROCEDURE — 3008F BODY MASS INDEX DOCD: CPT | Mod: CPTII,S$GLB,, | Performed by: NURSE PRACTITIONER

## 2021-04-08 PROCEDURE — 99999 PR PBB SHADOW E&M-EST. PATIENT-LVL V: ICD-10-PCS | Mod: PBBFAC,,, | Performed by: NURSE PRACTITIONER

## 2021-04-08 PROCEDURE — 83986 ASSAY PH BODY FLUID NOS: CPT | Performed by: NURSE PRACTITIONER

## 2021-04-08 PROCEDURE — 82656 EL-1 FECAL QUAL/SEMIQ: CPT | Performed by: NURSE PRACTITIONER

## 2021-04-08 PROCEDURE — 87045 FECES CULTURE AEROBIC BACT: CPT | Performed by: NURSE PRACTITIONER

## 2021-04-08 RX ORDER — DICYCLOMINE HYDROCHLORIDE 10 MG/1
10 CAPSULE ORAL
Qty: 120 CAPSULE | Refills: 0 | Status: SHIPPED | OUTPATIENT
Start: 2021-04-08 | End: 2021-05-08

## 2021-04-09 ENCOUNTER — TELEPHONE (OUTPATIENT)
Dept: PAIN MEDICINE | Facility: CLINIC | Age: 66
End: 2021-04-09

## 2021-04-09 ENCOUNTER — TELEPHONE (OUTPATIENT)
Dept: GASTROENTEROLOGY | Facility: CLINIC | Age: 66
End: 2021-04-09

## 2021-04-09 ENCOUNTER — TELEPHONE (OUTPATIENT)
Dept: NEUROSURGERY | Facility: CLINIC | Age: 66
End: 2021-04-09

## 2021-04-09 DIAGNOSIS — M96.1 FAILED BACK SYNDROME OF LUMBAR SPINE: ICD-10-CM

## 2021-04-09 DIAGNOSIS — A49.8 CLOSTRIDIUM DIFFICILE INFECTION: Primary | ICD-10-CM

## 2021-04-09 DIAGNOSIS — M96.1 POSTLAMINECTOMY SYNDROME OF LUMBAR REGION: Primary | ICD-10-CM

## 2021-04-09 LAB
CRYPTOSP AG STL QL IA: NEGATIVE
ELASTASE 1, FECAL: 150 MCG/G
G LAMBLIA AG STL QL IA: NEGATIVE
O+P STL MICRO: NORMAL
PH STL: NORMAL [PH]

## 2021-04-09 RX ORDER — VANCOMYCIN HYDROCHLORIDE 125 MG/1
125 CAPSULE ORAL EVERY 6 HOURS
Qty: 66 CAPSULE | Refills: 0 | Status: SHIPPED | OUTPATIENT
Start: 2021-04-09 | End: 2021-06-15 | Stop reason: ALTCHOICE

## 2021-04-11 LAB
E COLI SXT1 STL QL IA: NEGATIVE
E COLI SXT2 STL QL IA: NEGATIVE

## 2021-04-12 DIAGNOSIS — K86.89 PANCREATIC INSUFFICIENCY: Primary | ICD-10-CM

## 2021-04-12 LAB
6MAM UR QL: NOT DETECTED
7AMINOCLONAZEPAM UR QL: NOT DETECTED
A-OH ALPRAZ UR QL: NOT DETECTED
ALPHA-OH-MIDAZOLAM: NOT DETECTED
ALPRAZ UR QL: NOT DETECTED
AMPHET UR QL SCN: NOT DETECTED
ANNOTATION COMMENT IMP: NORMAL
ANNOTATION COMMENT IMP: NORMAL
BACTERIA STL CULT: NORMAL
BARBITURATES UR QL: NOT DETECTED
BUPRENORPHINE UR QL: NOT DETECTED
BZE UR QL: NOT DETECTED
CARBOXYTHC UR QL: NOT DETECTED
CARISOPRODOL UR QL: NOT DETECTED
CLONAZEPAM UR QL: NOT DETECTED
CODEINE UR QL: NOT DETECTED
CREAT UR-MCNC: 148.3 MG/DL (ref 20–400)
DIAZEPAM UR QL: NOT DETECTED
ETHYL GLUCURONIDE UR QL: NOT DETECTED
FENTANYL UR QL: NOT DETECTED
GABAPENTIN: PRESENT
HYDROCODONE UR QL: PRESENT
HYDROMORPHONE UR QL: PRESENT
LORAZEPAM UR QL: NOT DETECTED
MDA UR QL: NOT DETECTED
MDEA UR QL: NOT DETECTED
MDMA UR QL: NOT DETECTED
ME-PHENIDATE UR QL: NOT DETECTED
MEPERIDINE UR QL: NOT DETECTED
METHADONE UR QL: NOT DETECTED
METHAMPHET UR QL: NOT DETECTED
MIDAZOLAM UR QL SCN: NOT DETECTED
MORPHINE UR QL: NOT DETECTED
NALOXONE: NOT DETECTED
NORBUPRENORPHINE UR QL CFM: NOT DETECTED
NORDIAZEPAM UR QL: NOT DETECTED
NORFENTANYL UR QL: NOT DETECTED
NORHYDROCODONE UR QL CFM: PRESENT
NOROXYCODONE UR QL CFM: NOT DETECTED
NOROXYMORPHONE UR QL SCN: NOT DETECTED
OXAZEPAM UR QL: NOT DETECTED
OXYCODONE UR QL: NOT DETECTED
OXYMORPHONE UR QL: NOT DETECTED
PATHOLOGY STUDY: NORMAL
PCP UR QL: NOT DETECTED
PHENTERMINE UR QL: NOT DETECTED
PREGABALIN: NOT DETECTED
SERVICE CMNT-IMP: NORMAL
TAPENTADOL UR QL SCN: NOT DETECTED
TAPENTADOL-O-SULF: NOT DETECTED
TEMAZEPAM UR QL: NOT DETECTED
TRAMADOL UR QL: NOT DETECTED
ZOLPIDEM METABOLITE: NOT DETECTED
ZOLPIDEM UR QL: NOT DETECTED

## 2021-04-12 RX ORDER — PANCRELIPASE LIPASE, PANCRELIPASE PROTEASE, PANCRELIPASE AMYLASE 10000; 32000; 42000 [USP'U]/1; [USP'U]/1; [USP'U]/1
1 CAPSULE, DELAYED RELEASE ORAL
Qty: 90 CAPSULE | Refills: 2 | Status: SHIPPED | OUTPATIENT
Start: 2021-04-12 | End: 2021-07-11

## 2021-04-13 ENCOUNTER — TELEPHONE (OUTPATIENT)
Dept: GASTROENTEROLOGY | Facility: CLINIC | Age: 66
End: 2021-04-13

## 2021-04-15 DIAGNOSIS — M53.3 SACROILIAC JOINT PAIN: ICD-10-CM

## 2021-04-15 DIAGNOSIS — M96.1 POSTLAMINECTOMY SYNDROME OF LUMBAR REGION: ICD-10-CM

## 2021-04-15 DIAGNOSIS — G89.29 CHRONIC RIGHT HIP PAIN: ICD-10-CM

## 2021-04-15 DIAGNOSIS — Z79.891 OPIOID CONTRACT EXISTS: ICD-10-CM

## 2021-04-15 DIAGNOSIS — M70.61 GREATER TROCHANTERIC BURSITIS OF RIGHT HIP: ICD-10-CM

## 2021-04-15 DIAGNOSIS — M51.36 DDD (DEGENERATIVE DISC DISEASE), LUMBAR: ICD-10-CM

## 2021-04-15 DIAGNOSIS — Z96.651 STATUS POST TOTAL RIGHT KNEE REPLACEMENT: ICD-10-CM

## 2021-04-15 DIAGNOSIS — M43.07 LUMBOSACRAL SPONDYLOLYSIS: ICD-10-CM

## 2021-04-15 DIAGNOSIS — M43.6 TORTICOLLIS: ICD-10-CM

## 2021-04-15 DIAGNOSIS — M43.27 FUSION OF SPINE, LUMBOSACRAL REGION: ICD-10-CM

## 2021-04-15 DIAGNOSIS — M48.07 SPINAL STENOSIS, LUMBOSACRAL REGION: ICD-10-CM

## 2021-04-15 DIAGNOSIS — M25.551 CHRONIC RIGHT HIP PAIN: ICD-10-CM

## 2021-04-15 DIAGNOSIS — M23.203 DEGENERATIVE TEAR OF MEDIAL MENISCUS OF RIGHT KNEE: ICD-10-CM

## 2021-04-16 RX ORDER — HYDROCODONE BITARTRATE AND ACETAMINOPHEN 7.5; 325 MG/1; MG/1
1 TABLET ORAL EVERY 8 HOURS PRN
Qty: 90 TABLET | Refills: 0 | Status: SHIPPED | OUTPATIENT
Start: 2021-04-16 | End: 2021-05-21 | Stop reason: SDUPTHER

## 2021-04-16 RX ORDER — HYDROCODONE BITARTRATE AND ACETAMINOPHEN 10; 325 MG/1; MG/1
1 TABLET ORAL EVERY 8 HOURS PRN
Qty: 90 TABLET | Refills: 0 | Status: CANCELLED | OUTPATIENT
Start: 2021-04-16

## 2021-04-20 ENCOUNTER — IMMUNIZATION (OUTPATIENT)
Dept: FAMILY MEDICINE | Facility: CLINIC | Age: 66
End: 2021-04-20
Payer: MEDICARE

## 2021-04-20 DIAGNOSIS — Z23 NEED FOR VACCINATION: Primary | ICD-10-CM

## 2021-04-20 PROCEDURE — 91301 COVID-19, MRNA, LNP-S, PF, 100 MCG/0.5 ML DOSE VACCINE: CPT | Mod: S$GLB,,, | Performed by: FAMILY MEDICINE

## 2021-04-20 PROCEDURE — 0012A COVID-19, MRNA, LNP-S, PF, 100 MCG/0.5 ML DOSE VACCINE: CPT | Mod: CV19,S$GLB,, | Performed by: FAMILY MEDICINE

## 2021-04-20 PROCEDURE — 0012A COVID-19, MRNA, LNP-S, PF, 100 MCG/0.5 ML DOSE VACCINE: ICD-10-PCS | Mod: CV19,S$GLB,, | Performed by: FAMILY MEDICINE

## 2021-04-20 PROCEDURE — 91301 COVID-19, MRNA, LNP-S, PF, 100 MCG/0.5 ML DOSE VACCINE: ICD-10-PCS | Mod: S$GLB,,, | Performed by: FAMILY MEDICINE

## 2021-05-05 ENCOUNTER — TELEPHONE (OUTPATIENT)
Dept: PAIN MEDICINE | Facility: CLINIC | Age: 66
End: 2021-05-05

## 2021-05-05 DIAGNOSIS — M48.07 SPINAL STENOSIS, LUMBOSACRAL REGION: ICD-10-CM

## 2021-05-05 DIAGNOSIS — M51.36 DDD (DEGENERATIVE DISC DISEASE), LUMBAR: ICD-10-CM

## 2021-05-05 DIAGNOSIS — M43.07 LUMBOSACRAL SPONDYLOLYSIS: ICD-10-CM

## 2021-05-05 DIAGNOSIS — G89.4 CHRONIC PAIN DISORDER: ICD-10-CM

## 2021-05-05 DIAGNOSIS — M96.1 FAILED BACK SYNDROME OF LUMBAR SPINE: ICD-10-CM

## 2021-05-05 DIAGNOSIS — M96.1 POSTLAMINECTOMY SYNDROME OF LUMBAR REGION: Primary | ICD-10-CM

## 2021-05-05 RX ORDER — GABAPENTIN 400 MG/1
400 CAPSULE ORAL 2 TIMES DAILY
Qty: 180 CAPSULE | Refills: 1 | Status: SHIPPED | OUTPATIENT
Start: 2021-05-05 | End: 2021-12-02

## 2021-05-05 NOTE — ED NOTES
Dr. Mtahur & ANGELITA Herrera at bedside explaining to pt of no smoking policy here, pt insisting on going outside to her vehicle to smoke.   Subjective   Clarence Hoang is a 51 y.o. male. Patient is here today for No chief complaint on file.  Answers for HPI/ROS submitted by the patient on 2021  What is the primary reason for your visit?: Other  Please describe your symptoms.: None  Have you had these symptoms before?: No  How long have you been having these symptoms?: 1-4 days  Please list any medications you are currently taking for this condition.: NA  Please describe any probable cause for these symptoms. : NA           Vitals:    21 0917   BP: 122/60   Pulse: 57   Resp: 18   Temp: 97.1 °F (36.2 °C)   SpO2: 98%     Body mass index is 28.98 kg/m².  The following portions of the patient's history were reviewed and updated as appropriate: allergies, current medications, past family history, past medical history, past social history, past surgical history and problem list.    Past Medical History:   Diagnosis Date   • Anxiety    • GERD (gastroesophageal reflux disease)    • Overactive bladder       No Known Allergies   Social History     Socioeconomic History   • Marital status: Single     Spouse name: Not on file   • Number of children: Not on file   • Years of education: Not on file   • Highest education level: Not on file   Tobacco Use   • Smoking status: Former Smoker     Packs/day: 2.00     Years: 30.00     Pack years: 60.00     Types: Cigarettes     Quit date: 2017     Years since quittin.3   • Smokeless tobacco: Current User   • Tobacco comment: vaping    Vaping Use   • Vaping Use: Every day   • Substances: Nicotine, Flavoring   • Devices: Pre-filled or refillable cartridge   Substance and Sexual Activity   • Alcohol use: Not Currently   • Drug use: Never   • Sexual activity: Not Currently        Current Outpatient Medications:   •  ALPRAZolam (XANAX) 0.5 MG tablet, Take 1 tablet by mouth 2 (Two) Times a Day As Needed for Anxiety., Disp: 10 tablet, Rfl: 0  •  famotidine (PEPCID) 20 MG tablet, Take 1 tablet by mouth 2 (Two)  Times a Day., Disp: 60 tablet, Rfl: 1  •  mirtazapine (REMERON) 15 MG tablet, , Disp: , Rfl:   •  pantoprazole (PROTONIX) 40 MG EC tablet, , Disp: , Rfl:   •  tadalafil (CIALIS) 5 MG tablet, , Disp: , Rfl:   •  traZODone (DESYREL) 100 MG tablet, Take 100 mg by mouth Every Night., Disp: , Rfl:   •  emtricitabine-tenofovir (Truvada) 200-300 MG per tablet, Take 1 tablet by mouth Daily., Disp: 90 tablet, Rfl: 1     Objective     History of Present Illness  Norberto is a 51 year old male who is here to discuss starting truvada for HIV prevention/PREP. He has taken truvada in the past and has done well on it. This is a new problem to me.     Review of Systems   Constitutional: Negative.    Respiratory: Negative.    Cardiovascular: Negative.    Genitourinary: Positive for frequency.       Physical Exam  Vitals and nursing note reviewed.   Constitutional:       General: He is not in acute distress.     Appearance: Normal appearance. He is well-developed and well-groomed.   Cardiovascular:      Rate and Rhythm: Normal rate.   Pulmonary:      Effort: Pulmonary effort is normal.   Neurological:      Mental Status: He is alert.         ASSESSMENT     Problems Addressed this Visit     None      Visit Diagnoses     On pre-exposure prophylaxis for HIV    -  Primary    Relevant Orders    Hepatitis panel, acute    HIV-1/O/2 ANTIGEN/ANTIBODY, 4TH GENERATION    Comprehensive Metabolic Panel    HCV Antibody Reflex To [facility]    CBC & Differential    Urinalysis With Microscopic - Urine, Clean Catch      Diagnoses       Codes Comments    On pre-exposure prophylaxis for HIV    -  Primary ICD-10-CM: Z79.899  ICD-9-CM: V07.8           PLAN    Will get labs and call with results  rx for truvada sent to pharmacy.   He will need a HIV test every 3 months.     Return in about 3 months (around 8/5/2021) for 3 month lab only . (HIV) he is due for a physical in December  He will call for any med refills he needs   Follow up sooner if needed

## 2021-05-18 DIAGNOSIS — M62.838 MUSCLE SPASMS OF BOTH LOWER EXTREMITIES: ICD-10-CM

## 2021-05-18 DIAGNOSIS — M79.18 MYOFASCIAL PAIN: ICD-10-CM

## 2021-05-19 RX ORDER — BACLOFEN 10 MG/1
10 TABLET ORAL 3 TIMES DAILY PRN
Qty: 90 TABLET | Refills: 5 | Status: SHIPPED | OUTPATIENT
Start: 2021-05-19 | End: 2021-12-23 | Stop reason: SDUPTHER

## 2021-05-20 ENCOUNTER — OFFICE VISIT (OUTPATIENT)
Dept: GASTROENTEROLOGY | Facility: CLINIC | Age: 66
End: 2021-05-20
Payer: MEDICARE

## 2021-05-20 VITALS
DIASTOLIC BLOOD PRESSURE: 56 MMHG | HEART RATE: 61 BPM | SYSTOLIC BLOOD PRESSURE: 116 MMHG | WEIGHT: 174.38 LBS | BODY MASS INDEX: 27.31 KG/M2

## 2021-05-20 DIAGNOSIS — K86.89 PANCREATIC INSUFFICIENCY: ICD-10-CM

## 2021-05-20 DIAGNOSIS — G89.29 CHRONIC ABDOMINAL PAIN: ICD-10-CM

## 2021-05-20 DIAGNOSIS — R10.9 CHRONIC ABDOMINAL PAIN: ICD-10-CM

## 2021-05-20 DIAGNOSIS — A49.8 CLOSTRIDIUM DIFFICILE INFECTION: ICD-10-CM

## 2021-05-20 DIAGNOSIS — K52.9 CHRONIC DIARRHEA: Primary | ICD-10-CM

## 2021-05-20 PROCEDURE — 1125F PR PAIN SEVERITY QUANTIFIED, PAIN PRESENT: ICD-10-PCS | Mod: S$GLB,,, | Performed by: INTERNAL MEDICINE

## 2021-05-20 PROCEDURE — 99214 OFFICE O/P EST MOD 30 MIN: CPT | Mod: S$GLB,,, | Performed by: INTERNAL MEDICINE

## 2021-05-20 PROCEDURE — 99214 PR OFFICE/OUTPT VISIT, EST, LEVL IV, 30-39 MIN: ICD-10-PCS | Mod: S$GLB,,, | Performed by: INTERNAL MEDICINE

## 2021-05-20 PROCEDURE — 3008F BODY MASS INDEX DOCD: CPT | Mod: CPTII,S$GLB,, | Performed by: INTERNAL MEDICINE

## 2021-05-20 PROCEDURE — 99999 PR PBB SHADOW E&M-EST. PATIENT-LVL IV: CPT | Mod: PBBFAC,,, | Performed by: INTERNAL MEDICINE

## 2021-05-20 PROCEDURE — 3008F PR BODY MASS INDEX (BMI) DOCUMENTED: ICD-10-PCS | Mod: CPTII,S$GLB,, | Performed by: INTERNAL MEDICINE

## 2021-05-20 PROCEDURE — 99999 PR PBB SHADOW E&M-EST. PATIENT-LVL IV: ICD-10-PCS | Mod: PBBFAC,,, | Performed by: INTERNAL MEDICINE

## 2021-05-20 PROCEDURE — 1125F AMNT PAIN NOTED PAIN PRSNT: CPT | Mod: S$GLB,,, | Performed by: INTERNAL MEDICINE

## 2021-05-21 ENCOUNTER — TELEPHONE (OUTPATIENT)
Dept: PAIN MEDICINE | Facility: CLINIC | Age: 66
End: 2021-05-21

## 2021-05-21 DIAGNOSIS — Z79.891 OPIOID CONTRACT EXISTS: ICD-10-CM

## 2021-05-21 DIAGNOSIS — G89.29 CHRONIC RIGHT HIP PAIN: ICD-10-CM

## 2021-05-21 DIAGNOSIS — M96.1 POSTLAMINECTOMY SYNDROME OF LUMBAR REGION: ICD-10-CM

## 2021-05-21 DIAGNOSIS — M43.07 LUMBOSACRAL SPONDYLOLYSIS: ICD-10-CM

## 2021-05-21 DIAGNOSIS — M23.203 DEGENERATIVE TEAR OF MEDIAL MENISCUS OF RIGHT KNEE: ICD-10-CM

## 2021-05-21 DIAGNOSIS — M48.07 SPINAL STENOSIS, LUMBOSACRAL REGION: ICD-10-CM

## 2021-05-21 DIAGNOSIS — M51.36 DDD (DEGENERATIVE DISC DISEASE), LUMBAR: ICD-10-CM

## 2021-05-21 DIAGNOSIS — M70.61 GREATER TROCHANTERIC BURSITIS OF RIGHT HIP: ICD-10-CM

## 2021-05-21 DIAGNOSIS — M43.6 TORTICOLLIS: ICD-10-CM

## 2021-05-21 DIAGNOSIS — M43.27 FUSION OF SPINE, LUMBOSACRAL REGION: ICD-10-CM

## 2021-05-21 DIAGNOSIS — Z96.651 STATUS POST TOTAL RIGHT KNEE REPLACEMENT: ICD-10-CM

## 2021-05-21 DIAGNOSIS — M53.3 SACROILIAC JOINT PAIN: ICD-10-CM

## 2021-05-21 DIAGNOSIS — M25.551 CHRONIC RIGHT HIP PAIN: ICD-10-CM

## 2021-05-26 ENCOUNTER — TELEPHONE (OUTPATIENT)
Dept: PAIN MEDICINE | Facility: CLINIC | Age: 66
End: 2021-05-26

## 2021-05-26 DIAGNOSIS — M96.1 POSTLAMINECTOMY SYNDROME OF LUMBAR REGION: ICD-10-CM

## 2021-05-26 DIAGNOSIS — M43.27 FUSION OF SPINE, LUMBOSACRAL REGION: Primary | ICD-10-CM

## 2021-05-26 RX ORDER — HYDROCODONE BITARTRATE AND ACETAMINOPHEN 7.5; 325 MG/1; MG/1
1 TABLET ORAL EVERY 8 HOURS PRN
Qty: 90 TABLET | Refills: 0 | Status: SHIPPED | OUTPATIENT
Start: 2021-05-26 | End: 2021-08-23

## 2021-05-28 ENCOUNTER — TELEPHONE (OUTPATIENT)
Dept: PAIN MEDICINE | Facility: CLINIC | Age: 66
End: 2021-05-28

## 2021-06-02 DIAGNOSIS — R19.7 DIARRHEA, UNSPECIFIED TYPE: ICD-10-CM

## 2021-06-03 RX ORDER — MONTELUKAST SODIUM 4 MG/1
TABLET, CHEWABLE ORAL
Qty: 180 TABLET | Refills: 3 | Status: SHIPPED | OUTPATIENT
Start: 2021-06-03 | End: 2022-12-13 | Stop reason: SDUPTHER

## 2021-06-04 ENCOUNTER — ANESTHESIA EVENT (OUTPATIENT)
Dept: SURGERY | Facility: AMBULARY SURGERY CENTER | Age: 66
End: 2021-06-04
Payer: MEDICARE

## 2021-06-07 ENCOUNTER — ANESTHESIA (OUTPATIENT)
Dept: SURGERY | Facility: AMBULARY SURGERY CENTER | Age: 66
End: 2021-06-07
Payer: MEDICARE

## 2021-06-07 ENCOUNTER — HOSPITAL ENCOUNTER (OUTPATIENT)
Facility: AMBULARY SURGERY CENTER | Age: 66
Discharge: HOME | End: 2021-06-07
Attending: ANESTHESIOLOGY | Admitting: ANESTHESIOLOGY
Payer: MEDICARE

## 2021-06-07 DIAGNOSIS — M51.36 DEGENERATIVE DISC DISEASE, LUMBAR: Primary | ICD-10-CM

## 2021-06-07 DIAGNOSIS — M96.1 FAILED BACK SYNDROME OF LUMBAR SPINE: ICD-10-CM

## 2021-06-07 PROCEDURE — 77003 FLUOROGUIDE FOR SPINE INJECT: CPT | Performed by: ANESTHESIOLOGY

## 2021-06-07 PROCEDURE — 63650 IMPLANT NEUROELECTRODES: CPT | Performed by: ANESTHESIOLOGY

## 2021-06-07 PROCEDURE — 95971 PR ANALYZE NEUROSTIM,SIMPLE/PROG: ICD-10-PCS | Mod: 59,,, | Performed by: ANESTHESIOLOGY

## 2021-06-07 PROCEDURE — 95971 ALYS SMPL SP/PN NPGT W/PRGRM: CPT | Mod: 59,,, | Performed by: ANESTHESIOLOGY

## 2021-06-07 PROCEDURE — D9220A PRA ANESTHESIA: Mod: CRNA,,, | Performed by: NURSE ANESTHETIST, CERTIFIED REGISTERED

## 2021-06-07 PROCEDURE — D9220A PRA ANESTHESIA: ICD-10-PCS | Mod: CRNA,,, | Performed by: NURSE ANESTHETIST, CERTIFIED REGISTERED

## 2021-06-07 PROCEDURE — D9220A PRA ANESTHESIA: Mod: ANES,,, | Performed by: ANESTHESIOLOGY

## 2021-06-07 PROCEDURE — 63650 IMPLANT NEUROELECTRODES: CPT | Mod: ,,, | Performed by: ANESTHESIOLOGY

## 2021-06-07 PROCEDURE — D9220A PRA ANESTHESIA: ICD-10-PCS | Mod: ANES,,, | Performed by: ANESTHESIOLOGY

## 2021-06-07 PROCEDURE — 63650 PR PERCUT IMPLNT NEUROELECT,EPIDURAL: ICD-10-PCS | Mod: ,,, | Performed by: ANESTHESIOLOGY

## 2021-06-07 DEVICE — IMPLANTABLE DEVICE: Type: IMPLANTABLE DEVICE | Site: BACK | Status: FUNCTIONAL

## 2021-06-07 RX ORDER — ONDANSETRON 2 MG/ML
4 INJECTION INTRAMUSCULAR; INTRAVENOUS ONCE AS NEEDED
Status: DISCONTINUED | OUTPATIENT
Start: 2021-06-07 | End: 2021-06-10

## 2021-06-07 RX ORDER — LIDOCAINE HYDROCHLORIDE 10 MG/ML
INJECTION, SOLUTION EPIDURAL; INFILTRATION; INTRACAUDAL; PERINEURAL
Status: DISCONTINUED | OUTPATIENT
Start: 2021-06-07 | End: 2021-06-07 | Stop reason: HOSPADM

## 2021-06-07 RX ORDER — FENTANYL CITRATE 50 UG/ML
25 INJECTION, SOLUTION INTRAMUSCULAR; INTRAVENOUS EVERY 5 MIN PRN
Status: ACTIVE | OUTPATIENT
Start: 2021-06-07

## 2021-06-07 RX ORDER — ONDANSETRON 2 MG/ML
INJECTION INTRAMUSCULAR; INTRAVENOUS
Status: DISCONTINUED | OUTPATIENT
Start: 2021-06-07 | End: 2021-06-07

## 2021-06-07 RX ORDER — SODIUM CHLORIDE 0.9 % (FLUSH) 0.9 %
3 SYRINGE (ML) INJECTION EVERY 8 HOURS
Status: ACTIVE | OUTPATIENT
Start: 2021-06-07

## 2021-06-07 RX ORDER — OXYCODONE HYDROCHLORIDE 5 MG/1
5 TABLET ORAL ONCE AS NEEDED
Status: ACTIVE | OUTPATIENT
Start: 2021-06-07 | End: 2032-11-03

## 2021-06-07 RX ORDER — PROPOFOL 10 MG/ML
INJECTION, EMULSION INTRAVENOUS CONTINUOUS PRN
Status: DISCONTINUED | OUTPATIENT
Start: 2021-06-07 | End: 2021-06-07

## 2021-06-07 RX ORDER — HYDROMORPHONE HYDROCHLORIDE 1 MG/ML
0.2 INJECTION, SOLUTION INTRAMUSCULAR; INTRAVENOUS; SUBCUTANEOUS EVERY 5 MIN PRN
Status: ACTIVE | OUTPATIENT
Start: 2021-06-07

## 2021-06-07 RX ORDER — MEPERIDINE HYDROCHLORIDE 50 MG/ML
12.5 INJECTION INTRAMUSCULAR; INTRAVENOUS; SUBCUTANEOUS ONCE AS NEEDED
Status: ACTIVE | OUTPATIENT
Start: 2021-06-07 | End: 2021-06-08

## 2021-06-07 RX ORDER — LORAZEPAM 2 MG/ML
0.25 INJECTION INTRAMUSCULAR ONCE AS NEEDED
Status: ACTIVE | OUTPATIENT
Start: 2021-06-07 | End: 2032-11-03

## 2021-06-07 RX ORDER — LIDOCAINE HYDROCHLORIDE 10 MG/ML
1 INJECTION, SOLUTION EPIDURAL; INFILTRATION; INTRACAUDAL; PERINEURAL ONCE
Status: ACTIVE | OUTPATIENT
Start: 2021-06-07

## 2021-06-07 RX ORDER — MIDAZOLAM HYDROCHLORIDE 1 MG/ML
INJECTION INTRAMUSCULAR; INTRAVENOUS
Status: DISCONTINUED | OUTPATIENT
Start: 2021-06-07 | End: 2021-06-07

## 2021-06-07 RX ORDER — SODIUM CHLORIDE, SODIUM LACTATE, POTASSIUM CHLORIDE, CALCIUM CHLORIDE 600; 310; 30; 20 MG/100ML; MG/100ML; MG/100ML; MG/100ML
10 INJECTION, SOLUTION INTRAVENOUS CONTINUOUS
Status: ACTIVE | OUTPATIENT
Start: 2021-06-07

## 2021-06-07 RX ORDER — PROPOFOL 10 MG/ML
INJECTION, EMULSION INTRAVENOUS
Status: DISCONTINUED | OUTPATIENT
Start: 2021-06-07 | End: 2021-06-07

## 2021-06-07 RX ORDER — FENTANYL CITRATE 50 UG/ML
INJECTION, SOLUTION INTRAMUSCULAR; INTRAVENOUS
Status: DISCONTINUED | OUTPATIENT
Start: 2021-06-07 | End: 2021-06-07

## 2021-06-07 RX ORDER — PROCHLORPERAZINE EDISYLATE 5 MG/ML
5 INJECTION INTRAMUSCULAR; INTRAVENOUS EVERY 30 MIN PRN
Status: ACTIVE | OUTPATIENT
Start: 2021-06-07

## 2021-06-07 RX ORDER — SODIUM CHLORIDE, SODIUM LACTATE, POTASSIUM CHLORIDE, CALCIUM CHLORIDE 600; 310; 30; 20 MG/100ML; MG/100ML; MG/100ML; MG/100ML
INJECTION, SOLUTION INTRAVENOUS CONTINUOUS
Status: ACTIVE | OUTPATIENT
Start: 2021-06-07

## 2021-06-07 RX ORDER — LIDOCAINE HCL/PF 100 MG/5ML
SYRINGE (ML) INTRAVENOUS
Status: DISCONTINUED | OUTPATIENT
Start: 2021-06-07 | End: 2021-06-07

## 2021-06-07 RX ORDER — SODIUM CHLORIDE, SODIUM LACTATE, POTASSIUM CHLORIDE, CALCIUM CHLORIDE 600; 310; 30; 20 MG/100ML; MG/100ML; MG/100ML; MG/100ML
500 INJECTION, SOLUTION INTRAVENOUS ONCE
Status: ACTIVE | OUTPATIENT
Start: 2021-06-07

## 2021-06-07 RX ADMIN — Medication 80 MG: at 12:06

## 2021-06-07 RX ADMIN — SODIUM CHLORIDE, SODIUM LACTATE, POTASSIUM CHLORIDE, CALCIUM CHLORIDE 10 ML/HR: 600; 310; 30; 20 INJECTION, SOLUTION INTRAVENOUS at 11:06

## 2021-06-07 RX ADMIN — PROPOFOL 40 MG: 10 INJECTION, EMULSION INTRAVENOUS at 12:06

## 2021-06-07 RX ADMIN — ONDANSETRON 4 MG: 2 INJECTION INTRAMUSCULAR; INTRAVENOUS at 12:06

## 2021-06-07 RX ADMIN — MIDAZOLAM HYDROCHLORIDE 2 MG: 1 INJECTION INTRAMUSCULAR; INTRAVENOUS at 12:06

## 2021-06-07 RX ADMIN — PROPOFOL 50 MCG/KG/MIN: 10 INJECTION, EMULSION INTRAVENOUS at 12:06

## 2021-06-07 RX ADMIN — FENTANYL CITRATE 50 MCG: 50 INJECTION, SOLUTION INTRAMUSCULAR; INTRAVENOUS at 12:06

## 2021-06-08 ENCOUNTER — TELEPHONE (OUTPATIENT)
Dept: GASTROENTEROLOGY | Facility: CLINIC | Age: 66
End: 2021-06-08

## 2021-06-09 VITALS
HEIGHT: 67 IN | RESPIRATION RATE: 16 BRPM | HEART RATE: 56 BPM | WEIGHT: 174.38 LBS | SYSTOLIC BLOOD PRESSURE: 130 MMHG | OXYGEN SATURATION: 99 % | TEMPERATURE: 99 F | DIASTOLIC BLOOD PRESSURE: 62 MMHG | BODY MASS INDEX: 27.37 KG/M2

## 2021-06-10 ENCOUNTER — ANESTHESIA EVENT (OUTPATIENT)
Dept: ENDOSCOPY | Facility: HOSPITAL | Age: 66
End: 2021-06-10
Payer: MEDICARE

## 2021-06-10 ENCOUNTER — HOSPITAL ENCOUNTER (OUTPATIENT)
Facility: HOSPITAL | Age: 66
Discharge: HOME | End: 2021-06-10
Attending: INTERNAL MEDICINE | Admitting: INTERNAL MEDICINE
Payer: MEDICARE

## 2021-06-10 ENCOUNTER — ANESTHESIA (OUTPATIENT)
Dept: ENDOSCOPY | Facility: HOSPITAL | Age: 66
End: 2021-06-10
Payer: MEDICARE

## 2021-06-10 VITALS
DIASTOLIC BLOOD PRESSURE: 80 MMHG | BODY MASS INDEX: 27.31 KG/M2 | RESPIRATION RATE: 102 BRPM | TEMPERATURE: 98 F | SYSTOLIC BLOOD PRESSURE: 126 MMHG | HEART RATE: 54 BPM | OXYGEN SATURATION: 95 % | WEIGHT: 174.38 LBS

## 2021-06-10 DIAGNOSIS — K52.9 CHRONIC DIARRHEA: ICD-10-CM

## 2021-06-10 PROCEDURE — 45381 COLONOSCOPY SUBMUCOUS NJX: CPT | Performed by: INTERNAL MEDICINE

## 2021-06-10 PROCEDURE — D9220A PRA ANESTHESIA: ICD-10-PCS | Mod: ANES,,, | Performed by: ANESTHESIOLOGY

## 2021-06-10 PROCEDURE — 63600175 PHARM REV CODE 636 W HCPCS: Performed by: NURSE ANESTHETIST, CERTIFIED REGISTERED

## 2021-06-10 PROCEDURE — 88305 TISSUE EXAM BY PATHOLOGIST: CPT | Mod: 26,,, | Performed by: PATHOLOGY

## 2021-06-10 PROCEDURE — 88305 TISSUE EXAM BY PATHOLOGIST: ICD-10-PCS | Mod: 26,,, | Performed by: PATHOLOGY

## 2021-06-10 PROCEDURE — 45385 PR COLONOSCOPY,REMV LESN,SNARE: ICD-10-PCS | Mod: ,,, | Performed by: INTERNAL MEDICINE

## 2021-06-10 PROCEDURE — 27201089 HC SNARE, DISP (ANY): Performed by: INTERNAL MEDICINE

## 2021-06-10 PROCEDURE — 88305 TISSUE EXAM BY PATHOLOGIST: CPT | Mod: 59 | Performed by: PATHOLOGY

## 2021-06-10 PROCEDURE — 25000003 PHARM REV CODE 250: Performed by: INTERNAL MEDICINE

## 2021-06-10 PROCEDURE — 27201028 HC NEEDLE, SCLERO: Performed by: INTERNAL MEDICINE

## 2021-06-10 PROCEDURE — 37000009 HC ANESTHESIA EA ADD 15 MINS: Performed by: INTERNAL MEDICINE

## 2021-06-10 PROCEDURE — 63600175 PHARM REV CODE 636 W HCPCS: Performed by: INTERNAL MEDICINE

## 2021-06-10 PROCEDURE — 45385 COLONOSCOPY W/LESION REMOVAL: CPT | Mod: ,,, | Performed by: INTERNAL MEDICINE

## 2021-06-10 PROCEDURE — D9220A PRA ANESTHESIA: Mod: ANES,,, | Performed by: ANESTHESIOLOGY

## 2021-06-10 PROCEDURE — 43239 EGD BIOPSY SINGLE/MULTIPLE: CPT | Mod: 51,,, | Performed by: INTERNAL MEDICINE

## 2021-06-10 PROCEDURE — D9220A PRA ANESTHESIA: ICD-10-PCS | Mod: CRNA,,, | Performed by: NURSE ANESTHETIST, CERTIFIED REGISTERED

## 2021-06-10 PROCEDURE — 45380 COLONOSCOPY AND BIOPSY: CPT | Mod: 59,,, | Performed by: INTERNAL MEDICINE

## 2021-06-10 PROCEDURE — 43239 EGD BIOPSY SINGLE/MULTIPLE: CPT | Performed by: INTERNAL MEDICINE

## 2021-06-10 PROCEDURE — 25000003 PHARM REV CODE 250: Performed by: NURSE ANESTHETIST, CERTIFIED REGISTERED

## 2021-06-10 PROCEDURE — 45380 PR COLONOSCOPY,BIOPSY: ICD-10-PCS | Mod: 59,,, | Performed by: INTERNAL MEDICINE

## 2021-06-10 PROCEDURE — D9220A PRA ANESTHESIA: Mod: CRNA,,, | Performed by: NURSE ANESTHETIST, CERTIFIED REGISTERED

## 2021-06-10 PROCEDURE — 45381 PR COLONOSCPY,FLEX,W/DIR SUBMUC INJECT: ICD-10-PCS | Mod: 51,,, | Performed by: INTERNAL MEDICINE

## 2021-06-10 PROCEDURE — 45385 COLONOSCOPY W/LESION REMOVAL: CPT | Performed by: INTERNAL MEDICINE

## 2021-06-10 PROCEDURE — 45381 COLONOSCOPY SUBMUCOUS NJX: CPT | Mod: 51,,, | Performed by: INTERNAL MEDICINE

## 2021-06-10 PROCEDURE — 27201012 HC FORCEPS, HOT/COLD, DISP: Performed by: INTERNAL MEDICINE

## 2021-06-10 PROCEDURE — 45380 COLONOSCOPY AND BIOPSY: CPT | Performed by: INTERNAL MEDICINE

## 2021-06-10 PROCEDURE — 37000008 HC ANESTHESIA 1ST 15 MINUTES: Performed by: INTERNAL MEDICINE

## 2021-06-10 PROCEDURE — 43239 PR EGD, FLEX, W/BIOPSY, SGL/MULTI: ICD-10-PCS | Mod: 51,,, | Performed by: INTERNAL MEDICINE

## 2021-06-10 RX ORDER — ONDANSETRON 2 MG/ML
4 INJECTION INTRAMUSCULAR; INTRAVENOUS ONCE AS NEEDED
Status: DISCONTINUED | OUTPATIENT
Start: 2021-06-10 | End: 2021-06-10 | Stop reason: HOSPADM

## 2021-06-10 RX ORDER — LIDOCAINE HYDROCHLORIDE 20 MG/ML
INJECTION INTRAVENOUS
Status: DISCONTINUED | OUTPATIENT
Start: 2021-06-10 | End: 2021-06-10

## 2021-06-10 RX ORDER — SODIUM CHLORIDE 9 MG/ML
INJECTION, SOLUTION INTRAVENOUS CONTINUOUS
Status: DISCONTINUED | OUTPATIENT
Start: 2021-06-10 | End: 2021-06-10 | Stop reason: HOSPADM

## 2021-06-10 RX ORDER — PROPOFOL 10 MG/ML
VIAL (ML) INTRAVENOUS
Status: DISCONTINUED | OUTPATIENT
Start: 2021-06-10 | End: 2021-06-10

## 2021-06-10 RX ORDER — HYDROMORPHONE HYDROCHLORIDE 2 MG/ML
1 INJECTION, SOLUTION INTRAMUSCULAR; INTRAVENOUS; SUBCUTANEOUS ONCE
Status: COMPLETED | OUTPATIENT
Start: 2021-06-10 | End: 2021-06-10

## 2021-06-10 RX ADMIN — HYDROMORPHONE HYDROCHLORIDE 1 MG: 2 INJECTION INTRAMUSCULAR; INTRAVENOUS; SUBCUTANEOUS at 01:06

## 2021-06-10 RX ADMIN — PROPOFOL 50 MG: 10 INJECTION, EMULSION INTRAVENOUS at 12:06

## 2021-06-10 RX ADMIN — LIDOCAINE HYDROCHLORIDE 100 MG: 20 INJECTION, SOLUTION INTRAVENOUS at 11:06

## 2021-06-10 RX ADMIN — PROPOFOL 100 MG: 10 INJECTION, EMULSION INTRAVENOUS at 11:06

## 2021-06-10 RX ADMIN — PROPOFOL 50 MG: 10 INJECTION, EMULSION INTRAVENOUS at 11:06

## 2021-06-10 RX ADMIN — SODIUM CHLORIDE: 0.9 INJECTION, SOLUTION INTRAVENOUS at 11:06

## 2021-06-11 ENCOUNTER — TELEPHONE (OUTPATIENT)
Dept: PAIN MEDICINE | Facility: CLINIC | Age: 66
End: 2021-06-11

## 2021-06-11 ENCOUNTER — OFFICE VISIT (OUTPATIENT)
Dept: PAIN MEDICINE | Facility: CLINIC | Age: 66
End: 2021-06-11
Payer: MEDICARE

## 2021-06-11 VITALS
WEIGHT: 174.38 LBS | SYSTOLIC BLOOD PRESSURE: 106 MMHG | BODY MASS INDEX: 27.37 KG/M2 | HEIGHT: 67 IN | HEART RATE: 59 BPM | DIASTOLIC BLOOD PRESSURE: 58 MMHG

## 2021-06-11 DIAGNOSIS — M96.1 POSTLAMINECTOMY SYNDROME OF LUMBAR REGION: Primary | ICD-10-CM

## 2021-06-11 DIAGNOSIS — Z01.818 PRE-OP TESTING: Primary | ICD-10-CM

## 2021-06-11 PROCEDURE — 3008F BODY MASS INDEX DOCD: CPT | Mod: CPTII,S$GLB,, | Performed by: PHYSICIAN ASSISTANT

## 2021-06-11 PROCEDURE — 99024 PR POST-OP FOLLOW-UP VISIT: ICD-10-PCS | Mod: S$GLB,,, | Performed by: PHYSICIAN ASSISTANT

## 2021-06-11 PROCEDURE — 99999 PR PBB SHADOW E&M-EST. PATIENT-LVL III: ICD-10-PCS | Mod: PBBFAC,,, | Performed by: PHYSICIAN ASSISTANT

## 2021-06-11 PROCEDURE — 99999 PR PBB SHADOW E&M-EST. PATIENT-LVL III: CPT | Mod: PBBFAC,,, | Performed by: PHYSICIAN ASSISTANT

## 2021-06-11 PROCEDURE — 3008F PR BODY MASS INDEX (BMI) DOCUMENTED: ICD-10-PCS | Mod: CPTII,S$GLB,, | Performed by: PHYSICIAN ASSISTANT

## 2021-06-11 PROCEDURE — 99024 POSTOP FOLLOW-UP VISIT: CPT | Mod: S$GLB,,, | Performed by: PHYSICIAN ASSISTANT

## 2021-06-14 LAB
COMMENT: NORMAL
FINAL PATHOLOGIC DIAGNOSIS: NORMAL
GROSS: NORMAL
Lab: NORMAL

## 2021-06-15 ENCOUNTER — HOSPITAL ENCOUNTER (OUTPATIENT)
Dept: PREADMISSION TESTING | Facility: HOSPITAL | Age: 66
Discharge: HOME | End: 2021-06-15
Attending: ANESTHESIOLOGY
Payer: MEDICARE

## 2021-06-15 ENCOUNTER — HOSPITAL ENCOUNTER (OUTPATIENT)
Dept: RADIOLOGY | Facility: HOSPITAL | Age: 66
Discharge: HOME | End: 2021-06-15
Attending: ANESTHESIOLOGY
Payer: MEDICARE

## 2021-06-15 VITALS — WEIGHT: 170 LBS | HEIGHT: 67 IN | BODY MASS INDEX: 26.68 KG/M2

## 2021-06-15 DIAGNOSIS — Z01.818 PREOP TESTING: Primary | ICD-10-CM

## 2021-06-15 LAB
ANION GAP SERPL CALC-SCNC: 8 MMOL/L (ref 8–16)
BASOPHILS # BLD AUTO: 0.05 K/UL (ref 0–0.2)
BASOPHILS NFR BLD: 0.5 % (ref 0–1.9)
BUN SERPL-MCNC: 13 MG/DL (ref 8–23)
CALCIUM SERPL-MCNC: 10.5 MG/DL (ref 8.7–10.5)
CHLORIDE SERPL-SCNC: 110 MMOL/L (ref 95–110)
CO2 SERPL-SCNC: 24 MMOL/L (ref 23–29)
CREAT SERPL-MCNC: 0.9 MG/DL (ref 0.5–1.4)
DIFFERENTIAL METHOD: ABNORMAL
EOSINOPHIL # BLD AUTO: 0.2 K/UL (ref 0–0.5)
EOSINOPHIL NFR BLD: 1.7 % (ref 0–8)
ERYTHROCYTE [DISTWIDTH] IN BLOOD BY AUTOMATED COUNT: 15.8 % (ref 11.5–14.5)
EST. GFR  (AFRICAN AMERICAN): >60 ML/MIN/1.73 M^2
EST. GFR  (NON AFRICAN AMERICAN): >60 ML/MIN/1.73 M^2
GLUCOSE SERPL-MCNC: 89 MG/DL (ref 70–110)
HCT VFR BLD AUTO: 37.8 % (ref 37–48.5)
HGB BLD-MCNC: 11.7 G/DL (ref 12–16)
IMM GRANULOCYTES # BLD AUTO: 0.04 K/UL (ref 0–0.04)
IMM GRANULOCYTES NFR BLD AUTO: 0.4 % (ref 0–0.5)
LYMPHOCYTES # BLD AUTO: 2.2 K/UL (ref 1–4.8)
LYMPHOCYTES NFR BLD: 19.9 % (ref 18–48)
MCH RBC QN AUTO: 30 PG (ref 27–31)
MCHC RBC AUTO-ENTMCNC: 31 G/DL (ref 32–36)
MCV RBC AUTO: 97 FL (ref 82–98)
MONOCYTES # BLD AUTO: 0.7 K/UL (ref 0.3–1)
MONOCYTES NFR BLD: 6.6 % (ref 4–15)
NEUTROPHILS # BLD AUTO: 7.8 K/UL (ref 1.8–7.7)
NEUTROPHILS NFR BLD: 70.9 % (ref 38–73)
NRBC BLD-RTO: 0 /100 WBC
PLATELET # BLD AUTO: 289 K/UL (ref 150–450)
PMV BLD AUTO: 8.9 FL (ref 9.2–12.9)
POTASSIUM SERPL-SCNC: 4.2 MMOL/L (ref 3.5–5.1)
RBC # BLD AUTO: 3.9 M/UL (ref 4–5.4)
SODIUM SERPL-SCNC: 142 MMOL/L (ref 136–145)
WBC # BLD AUTO: 10.92 K/UL (ref 3.9–12.7)

## 2021-06-15 PROCEDURE — 36415 COLL VENOUS BLD VENIPUNCTURE: CPT | Performed by: ANESTHESIOLOGY

## 2021-06-15 PROCEDURE — 71046 X-RAY EXAM CHEST 2 VIEWS: CPT | Mod: 26,,, | Performed by: RADIOLOGY

## 2021-06-15 PROCEDURE — 71046 X-RAY EXAM CHEST 2 VIEWS: CPT | Mod: TC,FY

## 2021-06-15 PROCEDURE — 80048 BASIC METABOLIC PNL TOTAL CA: CPT | Performed by: ANESTHESIOLOGY

## 2021-06-15 PROCEDURE — 99900103 DSU ONLY-NO CHARGE-INITIAL HR (STAT)

## 2021-06-15 PROCEDURE — 99900104 DSU ONLY-NO CHARGE-EA ADD'L HR (STAT)

## 2021-06-15 PROCEDURE — 85025 COMPLETE CBC W/AUTO DIFF WBC: CPT | Performed by: ANESTHESIOLOGY

## 2021-06-15 PROCEDURE — 71046 XR CHEST PA AND LATERAL: ICD-10-PCS | Mod: 26,,, | Performed by: RADIOLOGY

## 2021-06-17 ENCOUNTER — ANESTHESIA EVENT (OUTPATIENT)
Dept: SURGERY | Facility: HOSPITAL | Age: 66
End: 2021-06-17
Payer: MEDICARE

## 2021-06-18 ENCOUNTER — HOSPITAL ENCOUNTER (OUTPATIENT)
Facility: HOSPITAL | Age: 66
Discharge: HOME | End: 2021-06-18
Attending: ANESTHESIOLOGY | Admitting: ANESTHESIOLOGY
Payer: MEDICARE

## 2021-06-18 ENCOUNTER — HOSPITAL ENCOUNTER (OUTPATIENT)
Dept: RADIOLOGY | Facility: HOSPITAL | Age: 66
Discharge: HOME | End: 2021-06-18
Attending: ANESTHESIOLOGY | Admitting: ANESTHESIOLOGY
Payer: MEDICARE

## 2021-06-18 ENCOUNTER — ANESTHESIA (OUTPATIENT)
Dept: SURGERY | Facility: HOSPITAL | Age: 66
End: 2021-06-18
Payer: MEDICARE

## 2021-06-18 VITALS
HEART RATE: 57 BPM | RESPIRATION RATE: 16 BRPM | DIASTOLIC BLOOD PRESSURE: 52 MMHG | OXYGEN SATURATION: 97 % | WEIGHT: 170 LBS | TEMPERATURE: 98 F | SYSTOLIC BLOOD PRESSURE: 110 MMHG | BODY MASS INDEX: 26.68 KG/M2 | HEIGHT: 67 IN

## 2021-06-18 DIAGNOSIS — M54.9 BACK PAIN: ICD-10-CM

## 2021-06-18 DIAGNOSIS — M96.1 FAILED BACK SYNDROME OF LUMBAR SPINE: Primary | ICD-10-CM

## 2021-06-18 DIAGNOSIS — Z01.818 PREOP TESTING: ICD-10-CM

## 2021-06-18 PROCEDURE — 71000015 HC POSTOP RECOV 1ST HR: Performed by: ANESTHESIOLOGY

## 2021-06-18 PROCEDURE — 63600175 PHARM REV CODE 636 W HCPCS: Performed by: ANESTHESIOLOGY

## 2021-06-18 PROCEDURE — 25000003 PHARM REV CODE 250: Performed by: ANESTHESIOLOGY

## 2021-06-18 PROCEDURE — 71000033 HC RECOVERY, INTIAL HOUR: Performed by: ANESTHESIOLOGY

## 2021-06-18 PROCEDURE — D9220A PRA ANESTHESIA: Mod: ANES,,, | Performed by: ANESTHESIOLOGY

## 2021-06-18 PROCEDURE — C1787 PATIENT PROGR, NEUROSTIM: HCPCS | Performed by: ANESTHESIOLOGY

## 2021-06-18 PROCEDURE — C1822 GEN, NEURO, HF, RECHG BAT: HCPCS | Performed by: ANESTHESIOLOGY

## 2021-06-18 PROCEDURE — C1778 LEAD, NEUROSTIMULATOR: HCPCS | Performed by: ANESTHESIOLOGY

## 2021-06-18 PROCEDURE — 63600175 PHARM REV CODE 636 W HCPCS: Performed by: NURSE ANESTHETIST, CERTIFIED REGISTERED

## 2021-06-18 PROCEDURE — 99900104 DSU ONLY-NO CHARGE-EA ADD'L HR (STAT): Performed by: ANESTHESIOLOGY

## 2021-06-18 PROCEDURE — 93005 ELECTROCARDIOGRAM TRACING: CPT | Mod: 59

## 2021-06-18 PROCEDURE — 25000003 PHARM REV CODE 250: Performed by: NURSE ANESTHETIST, CERTIFIED REGISTERED

## 2021-06-18 PROCEDURE — 36000706: Performed by: ANESTHESIOLOGY

## 2021-06-18 PROCEDURE — 36000707: Performed by: ANESTHESIOLOGY

## 2021-06-18 PROCEDURE — 63650 PR PERCUT IMPLNT NEUROELECT,EPIDURAL: ICD-10-PCS | Mod: 59,,, | Performed by: ANESTHESIOLOGY

## 2021-06-18 PROCEDURE — 71000039 HC RECOVERY, EACH ADD'L HOUR: Performed by: ANESTHESIOLOGY

## 2021-06-18 PROCEDURE — D9220A PRA ANESTHESIA: ICD-10-PCS | Mod: CRNA,,, | Performed by: NURSE ANESTHETIST, CERTIFIED REGISTERED

## 2021-06-18 PROCEDURE — L8699 PROSTHETIC IMPLANT NOS: HCPCS | Performed by: ANESTHESIOLOGY

## 2021-06-18 PROCEDURE — 63685 INS/RPLC SPI NPG/RCVR POCKET: CPT | Mod: ,,, | Performed by: ANESTHESIOLOGY

## 2021-06-18 PROCEDURE — 63685 PR IMPLANT SPINAL NEUROSTIM/RECEIVER: ICD-10-PCS | Mod: ,,, | Performed by: ANESTHESIOLOGY

## 2021-06-18 PROCEDURE — D9220A PRA ANESTHESIA: ICD-10-PCS | Mod: ANES,,, | Performed by: ANESTHESIOLOGY

## 2021-06-18 PROCEDURE — 63650 IMPLANT NEUROELECTRODES: CPT | Mod: 59,,, | Performed by: ANESTHESIOLOGY

## 2021-06-18 PROCEDURE — 76000 FLUOROSCOPY <1 HR PHYS/QHP: CPT | Mod: TC

## 2021-06-18 PROCEDURE — 37000009 HC ANESTHESIA EA ADD 15 MINS: Performed by: ANESTHESIOLOGY

## 2021-06-18 PROCEDURE — 99900103 DSU ONLY-NO CHARGE-INITIAL HR (STAT): Performed by: ANESTHESIOLOGY

## 2021-06-18 PROCEDURE — D9220A PRA ANESTHESIA: Mod: CRNA,,, | Performed by: NURSE ANESTHETIST, CERTIFIED REGISTERED

## 2021-06-18 PROCEDURE — 37000008 HC ANESTHESIA 1ST 15 MINUTES: Performed by: ANESTHESIOLOGY

## 2021-06-18 DEVICE — KIT LEAD ANCHOR N3000
Type: IMPLANTABLE DEVICE | Site: BACK | Status: NON-FUNCTIONAL
Removed: 2024-02-21

## 2021-06-18 DEVICE — KIT SENZA II OMNIA IPG 2500: Type: IMPLANTABLE DEVICE | Site: BACK | Status: FUNCTIONAL

## 2021-06-18 DEVICE — KIT LEAD 70CM: Type: IMPLANTABLE DEVICE | Site: BACK | Status: FUNCTIONAL

## 2021-06-18 RX ORDER — SODIUM CHLORIDE, SODIUM LACTATE, POTASSIUM CHLORIDE, CALCIUM CHLORIDE 600; 310; 30; 20 MG/100ML; MG/100ML; MG/100ML; MG/100ML
INJECTION, SOLUTION INTRAVENOUS CONTINUOUS
Status: DISCONTINUED | OUTPATIENT
Start: 2021-06-18 | End: 2021-06-18 | Stop reason: HOSPADM

## 2021-06-18 RX ORDER — LIDOCAINE HYDROCHLORIDE 10 MG/ML
1 INJECTION, SOLUTION EPIDURAL; INFILTRATION; INTRACAUDAL; PERINEURAL ONCE
Status: COMPLETED | OUTPATIENT
Start: 2021-06-18 | End: 2021-06-18

## 2021-06-18 RX ORDER — MUPIROCIN 20 MG/G
OINTMENT TOPICAL
Status: DISCONTINUED | OUTPATIENT
Start: 2021-06-18 | End: 2021-06-18 | Stop reason: HOSPADM

## 2021-06-18 RX ORDER — LIDOCAINE HYDROCHLORIDE 10 MG/ML
INJECTION, SOLUTION EPIDURAL; INFILTRATION; INTRACAUDAL; PERINEURAL
Status: DISCONTINUED | OUTPATIENT
Start: 2021-06-18 | End: 2021-06-18 | Stop reason: HOSPADM

## 2021-06-18 RX ORDER — ONDANSETRON 2 MG/ML
INJECTION INTRAMUSCULAR; INTRAVENOUS
Status: DISCONTINUED | OUTPATIENT
Start: 2021-06-18 | End: 2021-06-18

## 2021-06-18 RX ORDER — LIDOCAINE HYDROCHLORIDE 20 MG/ML
INJECTION INTRAVENOUS
Status: DISCONTINUED | OUTPATIENT
Start: 2021-06-18 | End: 2021-06-18

## 2021-06-18 RX ORDER — KETAMINE HYDROCHLORIDE 10 MG/ML
INJECTION, SOLUTION INTRAMUSCULAR; INTRAVENOUS
Status: DISCONTINUED | OUTPATIENT
Start: 2021-06-18 | End: 2021-06-18

## 2021-06-18 RX ORDER — OXYCODONE AND ACETAMINOPHEN 10; 325 MG/1; MG/1
1 TABLET ORAL EVERY 6 HOURS PRN
Qty: 30 TABLET | Refills: 0 | Status: SHIPPED | OUTPATIENT
Start: 2021-06-18 | End: 2021-12-23 | Stop reason: SDUPTHER

## 2021-06-18 RX ORDER — MIDAZOLAM HYDROCHLORIDE 1 MG/ML
INJECTION, SOLUTION INTRAMUSCULAR; INTRAVENOUS
Status: DISCONTINUED | OUTPATIENT
Start: 2021-06-18 | End: 2021-06-18

## 2021-06-18 RX ORDER — PHENYLEPHRINE HYDROCHLORIDE 10 MG/ML
INJECTION INTRAVENOUS
Status: DISCONTINUED | OUTPATIENT
Start: 2021-06-18 | End: 2021-06-18

## 2021-06-18 RX ORDER — BUTALBITAL, ACETAMINOPHEN AND CAFFEINE 50; 325; 40 MG/1; MG/1; MG/1
1 TABLET ORAL EVERY 6 HOURS PRN
Qty: 30 TABLET | Refills: 0 | Status: SHIPPED | OUTPATIENT
Start: 2021-06-18 | End: 2021-07-18

## 2021-06-18 RX ORDER — OXYCODONE HYDROCHLORIDE 5 MG/1
5 TABLET ORAL ONCE AS NEEDED
Status: COMPLETED | OUTPATIENT
Start: 2021-06-18 | End: 2021-06-18

## 2021-06-18 RX ORDER — PROPOFOL 10 MG/ML
VIAL (ML) INTRAVENOUS CONTINUOUS PRN
Status: DISCONTINUED | OUTPATIENT
Start: 2021-06-18 | End: 2021-06-18

## 2021-06-18 RX ORDER — BUPIVACAINE HYDROCHLORIDE AND EPINEPHRINE 2.5; 5 MG/ML; UG/ML
INJECTION, SOLUTION EPIDURAL; INFILTRATION; INTRACAUDAL; PERINEURAL
Status: DISCONTINUED | OUTPATIENT
Start: 2021-06-18 | End: 2021-06-18 | Stop reason: HOSPADM

## 2021-06-18 RX ORDER — ACETAMINOPHEN 10 MG/ML
INJECTION, SOLUTION INTRAVENOUS
Status: DISCONTINUED | OUTPATIENT
Start: 2021-06-18 | End: 2021-06-18

## 2021-06-18 RX ORDER — FENTANYL CITRATE 50 UG/ML
25 INJECTION, SOLUTION INTRAMUSCULAR; INTRAVENOUS EVERY 5 MIN PRN
Status: DISCONTINUED | OUTPATIENT
Start: 2021-06-18 | End: 2021-06-18 | Stop reason: HOSPADM

## 2021-06-18 RX ADMIN — PHENYLEPHRINE HYDROCHLORIDE 100 MCG: 10 INJECTION INTRAVENOUS at 07:06

## 2021-06-18 RX ADMIN — CEFAZOLIN 2 G: 1 INJECTION, POWDER, FOR SOLUTION INTRAVENOUS at 07:06

## 2021-06-18 RX ADMIN — LIDOCAINE HYDROCHLORIDE 10 MG: 10 INJECTION, SOLUTION EPIDURAL; INFILTRATION; INTRACAUDAL; PERINEURAL at 06:06

## 2021-06-18 RX ADMIN — LIDOCAINE HYDROCHLORIDE 20 MG: 20 INJECTION, SOLUTION INTRAVENOUS at 07:06

## 2021-06-18 RX ADMIN — SODIUM CHLORIDE, SODIUM GLUCONATE, SODIUM ACETATE, POTASSIUM CHLORIDE, MAGNESIUM CHLORIDE, SODIUM PHOSPHATE, DIBASIC, AND POTASSIUM PHOSPHATE: .53; .5; .37; .037; .03; .012; .00082 INJECTION, SOLUTION INTRAVENOUS at 06:06

## 2021-06-18 RX ADMIN — PROPOFOL 50 MCG/KG/MIN: 10 INJECTION, EMULSION INTRAVENOUS at 07:06

## 2021-06-18 RX ADMIN — ONDANSETRON 4 MG: 2 INJECTION, SOLUTION INTRAMUSCULAR; INTRAVENOUS at 07:06

## 2021-06-18 RX ADMIN — KETAMINE HYDROCHLORIDE 10 MG: 10 INJECTION, SOLUTION INTRAMUSCULAR; INTRAVENOUS at 07:06

## 2021-06-18 RX ADMIN — OXYCODONE 5 MG: 5 TABLET ORAL at 10:06

## 2021-06-18 RX ADMIN — GLYCOPYRROLATE 0.2 MG: 0.2 INJECTION, SOLUTION INTRAMUSCULAR; INTRAVITREAL at 06:06

## 2021-06-18 RX ADMIN — ACETAMINOPHEN 1000 MG: 10 INJECTION, SOLUTION INTRAVENOUS at 08:06

## 2021-06-18 RX ADMIN — MIDAZOLAM 2 MG: 1 INJECTION INTRAMUSCULAR; INTRAVENOUS at 06:06

## 2021-06-21 ENCOUNTER — TELEPHONE (OUTPATIENT)
Dept: FAMILY MEDICINE | Facility: CLINIC | Age: 66
End: 2021-06-21

## 2021-06-21 ENCOUNTER — TELEPHONE (OUTPATIENT)
Dept: PAIN MEDICINE | Facility: CLINIC | Age: 66
End: 2021-06-21

## 2021-06-21 DIAGNOSIS — E53.8 B12 DEFICIENCY: Primary | ICD-10-CM

## 2021-06-22 ENCOUNTER — TELEPHONE (OUTPATIENT)
Dept: FAMILY MEDICINE | Facility: CLINIC | Age: 66
End: 2021-06-22

## 2021-06-22 RX ORDER — CYANOCOBALAMIN 1000 UG/ML
1000 INJECTION, SOLUTION INTRAMUSCULAR; SUBCUTANEOUS
Qty: 10 ML | Refills: 3 | Status: SHIPPED | OUTPATIENT
Start: 2021-06-22 | End: 2022-09-13 | Stop reason: SDUPTHER

## 2021-06-23 ENCOUNTER — TELEPHONE (OUTPATIENT)
Dept: GASTROENTEROLOGY | Facility: CLINIC | Age: 66
End: 2021-06-23

## 2021-06-24 ENCOUNTER — PATIENT OUTREACH (OUTPATIENT)
Dept: ADMINISTRATIVE | Facility: OTHER | Age: 66
End: 2021-06-24

## 2021-06-25 ENCOUNTER — TELEPHONE (OUTPATIENT)
Dept: GASTROENTEROLOGY | Facility: CLINIC | Age: 66
End: 2021-06-25

## 2021-06-28 ENCOUNTER — OFFICE VISIT (OUTPATIENT)
Dept: PAIN MEDICINE | Facility: CLINIC | Age: 66
End: 2021-06-28
Payer: MEDICARE

## 2021-06-28 VITALS
HEIGHT: 67 IN | WEIGHT: 170 LBS | HEART RATE: 52 BPM | BODY MASS INDEX: 26.68 KG/M2 | SYSTOLIC BLOOD PRESSURE: 118 MMHG | DIASTOLIC BLOOD PRESSURE: 58 MMHG

## 2021-06-28 DIAGNOSIS — M96.1 POSTLAMINECTOMY SYNDROME OF LUMBAR REGION: Primary | ICD-10-CM

## 2021-06-28 PROCEDURE — 99024 POSTOP FOLLOW-UP VISIT: CPT | Mod: S$GLB,,, | Performed by: PHYSICIAN ASSISTANT

## 2021-06-28 PROCEDURE — 1125F PR PAIN SEVERITY QUANTIFIED, PAIN PRESENT: ICD-10-PCS | Mod: S$GLB,,, | Performed by: PHYSICIAN ASSISTANT

## 2021-06-28 PROCEDURE — 99999 PR PBB SHADOW E&M-EST. PATIENT-LVL V: ICD-10-PCS | Mod: PBBFAC,,, | Performed by: PHYSICIAN ASSISTANT

## 2021-06-28 PROCEDURE — 99024 PR POST-OP FOLLOW-UP VISIT: ICD-10-PCS | Mod: S$GLB,,, | Performed by: PHYSICIAN ASSISTANT

## 2021-06-28 PROCEDURE — 3008F PR BODY MASS INDEX (BMI) DOCUMENTED: ICD-10-PCS | Mod: CPTII,S$GLB,, | Performed by: PHYSICIAN ASSISTANT

## 2021-06-28 PROCEDURE — 99999 PR PBB SHADOW E&M-EST. PATIENT-LVL V: CPT | Mod: PBBFAC,,, | Performed by: PHYSICIAN ASSISTANT

## 2021-06-28 PROCEDURE — 1125F AMNT PAIN NOTED PAIN PRSNT: CPT | Mod: S$GLB,,, | Performed by: PHYSICIAN ASSISTANT

## 2021-06-28 PROCEDURE — 3008F BODY MASS INDEX DOCD: CPT | Mod: CPTII,S$GLB,, | Performed by: PHYSICIAN ASSISTANT

## 2021-07-01 ENCOUNTER — TELEPHONE (OUTPATIENT)
Dept: GASTROENTEROLOGY | Facility: CLINIC | Age: 66
End: 2021-07-01

## 2021-07-02 DIAGNOSIS — M48.07 SPINAL STENOSIS, LUMBOSACRAL REGION: ICD-10-CM

## 2021-07-02 DIAGNOSIS — M51.36 DDD (DEGENERATIVE DISC DISEASE), LUMBAR: ICD-10-CM

## 2021-07-02 DIAGNOSIS — M96.1 POSTLAMINECTOMY SYNDROME OF LUMBAR REGION: Primary | ICD-10-CM

## 2021-07-02 RX ORDER — OXYCODONE AND ACETAMINOPHEN 10; 325 MG/1; MG/1
1 TABLET ORAL EVERY 8 HOURS PRN
Qty: 90 TABLET | Refills: 0 | Status: SHIPPED | OUTPATIENT
Start: 2021-07-02 | End: 2021-08-18 | Stop reason: SDUPTHER

## 2021-07-06 ENCOUNTER — OFFICE VISIT (OUTPATIENT)
Dept: PAIN MEDICINE | Facility: CLINIC | Age: 66
End: 2021-07-06
Payer: MEDICARE

## 2021-07-06 VITALS
HEIGHT: 67 IN | HEART RATE: 66 BPM | SYSTOLIC BLOOD PRESSURE: 116 MMHG | BODY MASS INDEX: 26.68 KG/M2 | DIASTOLIC BLOOD PRESSURE: 70 MMHG | WEIGHT: 170 LBS

## 2021-07-06 DIAGNOSIS — G89.29 CHRONIC RIGHT HIP PAIN: ICD-10-CM

## 2021-07-06 DIAGNOSIS — M53.3 SACROILIAC JOINT PAIN: ICD-10-CM

## 2021-07-06 DIAGNOSIS — M48.07 SPINAL STENOSIS, LUMBOSACRAL REGION: ICD-10-CM

## 2021-07-06 DIAGNOSIS — G89.4 CHRONIC PAIN DISORDER: Primary | ICD-10-CM

## 2021-07-06 DIAGNOSIS — M70.61 GREATER TROCHANTERIC BURSITIS OF RIGHT HIP: ICD-10-CM

## 2021-07-06 DIAGNOSIS — M25.551 CHRONIC RIGHT HIP PAIN: ICD-10-CM

## 2021-07-06 DIAGNOSIS — M43.07 LUMBOSACRAL SPONDYLOLYSIS: ICD-10-CM

## 2021-07-06 DIAGNOSIS — M96.1 POSTLAMINECTOMY SYNDROME OF LUMBAR REGION: Primary | ICD-10-CM

## 2021-07-06 PROCEDURE — 99999 PR PBB SHADOW E&M-EST. PATIENT-LVL IV: CPT | Mod: PBBFAC,,, | Performed by: PHYSICIAN ASSISTANT

## 2021-07-06 PROCEDURE — 1125F PR PAIN SEVERITY QUANTIFIED, PAIN PRESENT: ICD-10-PCS | Mod: S$GLB,,, | Performed by: PHYSICIAN ASSISTANT

## 2021-07-06 PROCEDURE — 99999 PR PBB SHADOW E&M-EST. PATIENT-LVL IV: ICD-10-PCS | Mod: PBBFAC,,, | Performed by: PHYSICIAN ASSISTANT

## 2021-07-06 PROCEDURE — 99024 PR POST-OP FOLLOW-UP VISIT: ICD-10-PCS | Mod: S$GLB,,, | Performed by: PHYSICIAN ASSISTANT

## 2021-07-06 PROCEDURE — 3008F BODY MASS INDEX DOCD: CPT | Mod: CPTII,S$GLB,, | Performed by: PHYSICIAN ASSISTANT

## 2021-07-06 PROCEDURE — 1125F AMNT PAIN NOTED PAIN PRSNT: CPT | Mod: S$GLB,,, | Performed by: PHYSICIAN ASSISTANT

## 2021-07-06 PROCEDURE — 3008F PR BODY MASS INDEX (BMI) DOCUMENTED: ICD-10-PCS | Mod: CPTII,S$GLB,, | Performed by: PHYSICIAN ASSISTANT

## 2021-07-06 PROCEDURE — 99024 POSTOP FOLLOW-UP VISIT: CPT | Mod: S$GLB,,, | Performed by: PHYSICIAN ASSISTANT

## 2021-07-06 RX ORDER — OXYCODONE AND ACETAMINOPHEN 7.5; 325 MG/1; MG/1
1 TABLET ORAL EVERY 8 HOURS PRN
Qty: 90 TABLET | Refills: 0 | Status: SHIPPED | OUTPATIENT
Start: 2021-07-06 | End: 2021-08-04

## 2021-07-09 DIAGNOSIS — M25.551 RIGHT HIP PAIN: ICD-10-CM

## 2021-07-09 DIAGNOSIS — Z96.651 STATUS POST RIGHT KNEE REPLACEMENT: Primary | ICD-10-CM

## 2021-07-12 ENCOUNTER — HOSPITAL ENCOUNTER (OUTPATIENT)
Dept: RADIOLOGY | Facility: HOSPITAL | Age: 66
Discharge: HOME | End: 2021-07-12
Attending: ORTHOPAEDIC SURGERY
Payer: MEDICARE

## 2021-07-12 ENCOUNTER — OFFICE VISIT (OUTPATIENT)
Dept: ORTHOPEDICS | Facility: CLINIC | Age: 66
End: 2021-07-12
Payer: MEDICARE

## 2021-07-12 VITALS — RESPIRATION RATE: 16 BRPM | BODY MASS INDEX: 26.68 KG/M2 | WEIGHT: 170 LBS | HEIGHT: 67 IN

## 2021-07-12 DIAGNOSIS — Z96.651 STATUS POST RIGHT KNEE REPLACEMENT: ICD-10-CM

## 2021-07-12 DIAGNOSIS — M25.551 RIGHT HIP PAIN: ICD-10-CM

## 2021-07-12 DIAGNOSIS — M25.551 RIGHT HIP PAIN: Primary | ICD-10-CM

## 2021-07-12 PROCEDURE — 3008F PR BODY MASS INDEX (BMI) DOCUMENTED: ICD-10-PCS | Mod: CPTII,S$GLB,, | Performed by: ORTHOPAEDIC SURGERY

## 2021-07-12 PROCEDURE — 73562 X-RAY EXAM OF KNEE 3: CPT | Mod: 26,LT,, | Performed by: RADIOLOGY

## 2021-07-12 PROCEDURE — 73502 X-RAY EXAM HIP UNI 2-3 VIEWS: CPT | Mod: TC,PN,RT

## 2021-07-12 PROCEDURE — 73562 XR KNEE ORTHO RIGHT WITH FLEXION: ICD-10-PCS | Mod: 26,LT,, | Performed by: RADIOLOGY

## 2021-07-12 PROCEDURE — 1125F PR PAIN SEVERITY QUANTIFIED, PAIN PRESENT: ICD-10-PCS | Mod: S$GLB,,, | Performed by: ORTHOPAEDIC SURGERY

## 2021-07-12 PROCEDURE — 73564 XR KNEE ORTHO RIGHT WITH FLEXION: ICD-10-PCS | Mod: 26,RT,, | Performed by: RADIOLOGY

## 2021-07-12 PROCEDURE — 73502 XR HIP WITH PELVIS WHEN PERFORMED, 2 OR 3  VIEWS RIGHT: ICD-10-PCS | Mod: 26,RT,, | Performed by: RADIOLOGY

## 2021-07-12 PROCEDURE — 99999 PR PBB SHADOW E&M-EST. PATIENT-LVL III: ICD-10-PCS | Mod: PBBFAC,,, | Performed by: ORTHOPAEDIC SURGERY

## 2021-07-12 PROCEDURE — 99999 PR PBB SHADOW E&M-EST. PATIENT-LVL III: CPT | Mod: PBBFAC,,, | Performed by: ORTHOPAEDIC SURGERY

## 2021-07-12 PROCEDURE — 73564 X-RAY EXAM KNEE 4 OR MORE: CPT | Mod: TC,PN,RT

## 2021-07-12 PROCEDURE — 1125F AMNT PAIN NOTED PAIN PRSNT: CPT | Mod: S$GLB,,, | Performed by: ORTHOPAEDIC SURGERY

## 2021-07-12 PROCEDURE — 99214 OFFICE O/P EST MOD 30 MIN: CPT | Mod: S$GLB,,, | Performed by: ORTHOPAEDIC SURGERY

## 2021-07-12 PROCEDURE — 3008F BODY MASS INDEX DOCD: CPT | Mod: CPTII,S$GLB,, | Performed by: ORTHOPAEDIC SURGERY

## 2021-07-12 PROCEDURE — 73564 X-RAY EXAM KNEE 4 OR MORE: CPT | Mod: 26,RT,, | Performed by: RADIOLOGY

## 2021-07-12 PROCEDURE — 73502 X-RAY EXAM HIP UNI 2-3 VIEWS: CPT | Mod: 26,RT,, | Performed by: RADIOLOGY

## 2021-07-12 PROCEDURE — 99214 PR OFFICE/OUTPT VISIT, EST, LEVL IV, 30-39 MIN: ICD-10-PCS | Mod: S$GLB,,, | Performed by: ORTHOPAEDIC SURGERY

## 2021-07-20 ENCOUNTER — TELEPHONE (OUTPATIENT)
Dept: PAIN MEDICINE | Facility: CLINIC | Age: 66
End: 2021-07-20

## 2021-07-22 ENCOUNTER — TELEPHONE (OUTPATIENT)
Dept: ENDOSCOPY | Facility: HOSPITAL | Age: 66
End: 2021-07-22

## 2021-07-22 DIAGNOSIS — K63.9 NODULE OF COLON: Primary | ICD-10-CM

## 2021-07-23 ENCOUNTER — TELEPHONE (OUTPATIENT)
Dept: ENDOSCOPY | Facility: HOSPITAL | Age: 66
End: 2021-07-23

## 2021-07-26 ENCOUNTER — TELEPHONE (OUTPATIENT)
Dept: ENDOSCOPY | Facility: HOSPITAL | Age: 66
End: 2021-07-26

## 2021-07-26 ENCOUNTER — TELEPHONE (OUTPATIENT)
Dept: ORTHOPEDICS | Facility: CLINIC | Age: 66
End: 2021-07-26

## 2021-07-26 ENCOUNTER — TELEPHONE (OUTPATIENT)
Dept: CARDIOLOGY | Facility: CLINIC | Age: 66
End: 2021-07-26

## 2021-07-26 DIAGNOSIS — M79.671 RIGHT FOOT PAIN: Primary | ICD-10-CM

## 2021-07-29 ENCOUNTER — TELEPHONE (OUTPATIENT)
Dept: ENDOSCOPY | Facility: HOSPITAL | Age: 66
End: 2021-07-29

## 2021-07-30 ENCOUNTER — PATIENT OUTREACH (OUTPATIENT)
Dept: ADMINISTRATIVE | Facility: OTHER | Age: 66
End: 2021-07-30

## 2021-07-30 ENCOUNTER — TELEPHONE (OUTPATIENT)
Dept: ORTHOPEDICS | Facility: CLINIC | Age: 66
End: 2021-07-30

## 2021-08-04 ENCOUNTER — TELEPHONE (OUTPATIENT)
Dept: ENDOSCOPY | Facility: HOSPITAL | Age: 66
End: 2021-08-04

## 2021-08-05 ENCOUNTER — TELEPHONE (OUTPATIENT)
Dept: ENDOSCOPY | Facility: HOSPITAL | Age: 66
End: 2021-08-05

## 2021-08-11 ENCOUNTER — TELEPHONE (OUTPATIENT)
Dept: PAIN MEDICINE | Facility: CLINIC | Age: 66
End: 2021-08-11

## 2021-08-12 ENCOUNTER — TELEPHONE (OUTPATIENT)
Dept: PEDIATRICS | Facility: CLINIC | Age: 66
End: 2021-08-12

## 2021-08-18 DIAGNOSIS — M48.07 SPINAL STENOSIS, LUMBOSACRAL REGION: ICD-10-CM

## 2021-08-18 DIAGNOSIS — M51.36 DDD (DEGENERATIVE DISC DISEASE), LUMBAR: ICD-10-CM

## 2021-08-18 DIAGNOSIS — M96.1 POSTLAMINECTOMY SYNDROME OF LUMBAR REGION: ICD-10-CM

## 2021-08-18 RX ORDER — OXYCODONE AND ACETAMINOPHEN 10; 325 MG/1; MG/1
1 TABLET ORAL EVERY 8 HOURS PRN
Qty: 90 TABLET | Refills: 0 | Status: SHIPPED | OUTPATIENT
Start: 2021-08-18 | End: 2021-08-23 | Stop reason: SDUPTHER

## 2021-08-23 ENCOUNTER — OFFICE VISIT (OUTPATIENT)
Dept: PAIN MEDICINE | Facility: CLINIC | Age: 66
End: 2021-08-23
Payer: MEDICARE

## 2021-08-23 VITALS
TEMPERATURE: 98 F | DIASTOLIC BLOOD PRESSURE: 61 MMHG | HEART RATE: 60 BPM | HEIGHT: 67 IN | BODY MASS INDEX: 26.94 KG/M2 | SYSTOLIC BLOOD PRESSURE: 115 MMHG | OXYGEN SATURATION: 97 % | WEIGHT: 171.63 LBS

## 2021-08-23 DIAGNOSIS — M47.817 SPONDYLOSIS OF LUMBOSACRAL REGION WITHOUT MYELOPATHY OR RADICULOPATHY: ICD-10-CM

## 2021-08-23 DIAGNOSIS — M48.07 SPINAL STENOSIS, LUMBOSACRAL REGION: ICD-10-CM

## 2021-08-23 DIAGNOSIS — F11.90 CHRONIC, CONTINUOUS USE OF OPIOIDS: ICD-10-CM

## 2021-08-23 DIAGNOSIS — M96.1 POSTLAMINECTOMY SYNDROME OF LUMBAR REGION: Primary | ICD-10-CM

## 2021-08-23 DIAGNOSIS — Z96.651 STATUS POST TOTAL RIGHT KNEE REPLACEMENT: ICD-10-CM

## 2021-08-23 DIAGNOSIS — M43.07 LUMBOSACRAL SPONDYLOLYSIS: ICD-10-CM

## 2021-08-23 DIAGNOSIS — M96.1 FAILED BACK SYNDROME OF LUMBAR SPINE: ICD-10-CM

## 2021-08-23 DIAGNOSIS — Z45.42 ENCOUNTER FOR ADJUSTMENT AND MANAGEMENT OF NEUROSTIMULATOR: ICD-10-CM

## 2021-08-23 DIAGNOSIS — M53.3 SACROILIAC JOINT PAIN: ICD-10-CM

## 2021-08-23 DIAGNOSIS — M50.30 DDD (DEGENERATIVE DISC DISEASE), CERVICAL: ICD-10-CM

## 2021-08-23 DIAGNOSIS — Z79.01 CHRONIC ANTICOAGULATION: ICD-10-CM

## 2021-08-23 PROCEDURE — 1160F RVW MEDS BY RX/DR IN RCRD: CPT | Mod: CPTII,S$GLB,, | Performed by: NURSE PRACTITIONER

## 2021-08-23 PROCEDURE — 1125F PR PAIN SEVERITY QUANTIFIED, PAIN PRESENT: ICD-10-PCS | Mod: CPTII,S$GLB,, | Performed by: NURSE PRACTITIONER

## 2021-08-23 PROCEDURE — 1160F PR REVIEW ALL MEDS BY PRESCRIBER/CLIN PHARMACIST DOCUMENTED: ICD-10-PCS | Mod: CPTII,S$GLB,, | Performed by: NURSE PRACTITIONER

## 2021-08-23 PROCEDURE — 99999 PR PBB SHADOW E&M-EST. PATIENT-LVL IV: CPT | Mod: PBBFAC,,, | Performed by: NURSE PRACTITIONER

## 2021-08-23 PROCEDURE — 3074F PR MOST RECENT SYSTOLIC BLOOD PRESSURE < 130 MM HG: ICD-10-PCS | Mod: CPTII,S$GLB,, | Performed by: NURSE PRACTITIONER

## 2021-08-23 PROCEDURE — 3008F BODY MASS INDEX DOCD: CPT | Mod: CPTII,S$GLB,, | Performed by: NURSE PRACTITIONER

## 2021-08-23 PROCEDURE — 1125F AMNT PAIN NOTED PAIN PRSNT: CPT | Mod: CPTII,S$GLB,, | Performed by: NURSE PRACTITIONER

## 2021-08-23 PROCEDURE — 99999 PR PBB SHADOW E&M-EST. PATIENT-LVL IV: ICD-10-PCS | Mod: PBBFAC,,, | Performed by: NURSE PRACTITIONER

## 2021-08-23 PROCEDURE — 1159F PR MEDICATION LIST DOCUMENTED IN MEDICAL RECORD: ICD-10-PCS | Mod: CPTII,S$GLB,, | Performed by: NURSE PRACTITIONER

## 2021-08-23 PROCEDURE — 1159F MED LIST DOCD IN RCRD: CPT | Mod: CPTII,S$GLB,, | Performed by: NURSE PRACTITIONER

## 2021-08-23 PROCEDURE — 3074F SYST BP LT 130 MM HG: CPT | Mod: CPTII,S$GLB,, | Performed by: NURSE PRACTITIONER

## 2021-08-23 PROCEDURE — 3078F DIAST BP <80 MM HG: CPT | Mod: CPTII,S$GLB,, | Performed by: NURSE PRACTITIONER

## 2021-08-23 PROCEDURE — 3008F PR BODY MASS INDEX (BMI) DOCUMENTED: ICD-10-PCS | Mod: CPTII,S$GLB,, | Performed by: NURSE PRACTITIONER

## 2021-08-23 PROCEDURE — 99214 PR OFFICE/OUTPT VISIT, EST, LEVL IV, 30-39 MIN: ICD-10-PCS | Mod: S$GLB,,, | Performed by: NURSE PRACTITIONER

## 2021-08-23 PROCEDURE — 99214 OFFICE O/P EST MOD 30 MIN: CPT | Mod: S$GLB,,, | Performed by: NURSE PRACTITIONER

## 2021-08-23 PROCEDURE — 3078F PR MOST RECENT DIASTOLIC BLOOD PRESSURE < 80 MM HG: ICD-10-PCS | Mod: CPTII,S$GLB,, | Performed by: NURSE PRACTITIONER

## 2021-08-25 ENCOUNTER — HOSPITAL ENCOUNTER (OUTPATIENT)
Dept: RADIOLOGY | Facility: HOSPITAL | Age: 66
Discharge: HOME | End: 2021-08-25
Attending: NURSE PRACTITIONER
Payer: MEDICARE

## 2021-08-25 DIAGNOSIS — Z96.82 PRESENCE OF NEUROSTIMULATOR: ICD-10-CM

## 2021-08-25 DIAGNOSIS — Z96.82 PRESENCE OF NEUROSTIMULATOR: Primary | ICD-10-CM

## 2021-08-25 PROCEDURE — 72100 X-RAY EXAM L-S SPINE 2/3 VWS: CPT | Mod: TC,PN

## 2021-08-25 PROCEDURE — 72070 X-RAY EXAM THORAC SPINE 2VWS: CPT | Mod: TC,PN

## 2021-08-25 PROCEDURE — 72070 XR THORACIC SPINE AP LATERAL: ICD-10-PCS | Mod: 26,,, | Performed by: RADIOLOGY

## 2021-08-25 PROCEDURE — 72070 X-RAY EXAM THORAC SPINE 2VWS: CPT | Mod: 26,,, | Performed by: RADIOLOGY

## 2021-08-25 PROCEDURE — 72100 X-RAY EXAM L-S SPINE 2/3 VWS: CPT | Mod: 26,,, | Performed by: RADIOLOGY

## 2021-08-25 PROCEDURE — 72100 XR LUMBAR SPINE AP AND LATERAL: ICD-10-PCS | Mod: 26,,, | Performed by: RADIOLOGY

## 2021-08-27 DIAGNOSIS — M46.1 SACROILIITIS: Primary | ICD-10-CM

## 2021-09-03 ENCOUNTER — CLINICAL SUPPORT (OUTPATIENT)
Dept: REHABILITATION | Facility: HOSPITAL | Age: 66
End: 2021-09-03
Payer: MEDICARE

## 2021-09-03 DIAGNOSIS — M54.2 CHRONIC CERVICAL PAIN: Primary | ICD-10-CM

## 2021-09-03 DIAGNOSIS — G89.29 CHRONIC CERVICAL PAIN: Primary | ICD-10-CM

## 2021-09-03 DIAGNOSIS — R68.89 ACTIVITY INTOLERANCE: ICD-10-CM

## 2021-09-03 PROBLEM — M79.18 CERVICAL MYOFASCIAL PAIN SYNDROME: Status: RESOLVED | Noted: 2020-10-16 | Resolved: 2021-09-03

## 2021-09-03 PROCEDURE — 97163 PT EVAL HIGH COMPLEX 45 MIN: CPT | Mod: PN

## 2021-09-03 PROCEDURE — 97140 MANUAL THERAPY 1/> REGIONS: CPT | Mod: PN

## 2021-09-07 ENCOUNTER — CLINICAL SUPPORT (OUTPATIENT)
Dept: REHABILITATION | Facility: HOSPITAL | Age: 66
End: 2021-09-07
Payer: MEDICARE

## 2021-09-07 DIAGNOSIS — M54.2 CHRONIC CERVICAL PAIN: Primary | ICD-10-CM

## 2021-09-07 DIAGNOSIS — R68.89 ACTIVITY INTOLERANCE: ICD-10-CM

## 2021-09-07 DIAGNOSIS — G89.29 CHRONIC CERVICAL PAIN: Primary | ICD-10-CM

## 2021-09-07 PROCEDURE — 97110 THERAPEUTIC EXERCISES: CPT | Mod: PN,CQ

## 2021-09-07 PROCEDURE — 97140 MANUAL THERAPY 1/> REGIONS: CPT | Mod: PN,CQ

## 2021-09-09 ENCOUNTER — CLINICAL SUPPORT (OUTPATIENT)
Dept: REHABILITATION | Facility: HOSPITAL | Age: 66
End: 2021-09-09
Payer: MEDICARE

## 2021-09-09 DIAGNOSIS — M54.2 CHRONIC CERVICAL PAIN: ICD-10-CM

## 2021-09-09 DIAGNOSIS — R68.89 ACTIVITY INTOLERANCE: ICD-10-CM

## 2021-09-09 DIAGNOSIS — G89.29 CHRONIC CERVICAL PAIN: ICD-10-CM

## 2021-09-09 PROCEDURE — 97140 MANUAL THERAPY 1/> REGIONS: CPT | Mod: PN,CQ

## 2021-09-09 PROCEDURE — 97110 THERAPEUTIC EXERCISES: CPT | Mod: PN,CQ

## 2021-09-14 ENCOUNTER — CLINICAL SUPPORT (OUTPATIENT)
Dept: REHABILITATION | Facility: HOSPITAL | Age: 66
End: 2021-09-14
Payer: MEDICARE

## 2021-09-14 DIAGNOSIS — R68.89 ACTIVITY INTOLERANCE: ICD-10-CM

## 2021-09-14 DIAGNOSIS — M54.2 CHRONIC CERVICAL PAIN: ICD-10-CM

## 2021-09-14 DIAGNOSIS — G89.29 CHRONIC CERVICAL PAIN: ICD-10-CM

## 2021-09-14 PROCEDURE — 97110 THERAPEUTIC EXERCISES: CPT | Mod: PN

## 2021-09-14 PROCEDURE — 97140 MANUAL THERAPY 1/> REGIONS: CPT | Mod: PN

## 2021-09-15 ENCOUNTER — TELEPHONE (OUTPATIENT)
Dept: PAIN MEDICINE | Facility: CLINIC | Age: 66
End: 2021-09-15

## 2021-09-16 ENCOUNTER — CLINICAL SUPPORT (OUTPATIENT)
Dept: REHABILITATION | Facility: HOSPITAL | Age: 66
End: 2021-09-16
Payer: MEDICARE

## 2021-09-16 DIAGNOSIS — G89.29 CHRONIC CERVICAL PAIN: ICD-10-CM

## 2021-09-16 DIAGNOSIS — M54.2 CHRONIC CERVICAL PAIN: ICD-10-CM

## 2021-09-16 DIAGNOSIS — R68.89 ACTIVITY INTOLERANCE: ICD-10-CM

## 2021-09-16 PROCEDURE — 97110 THERAPEUTIC EXERCISES: CPT | Mod: PN

## 2021-09-16 PROCEDURE — 97140 MANUAL THERAPY 1/> REGIONS: CPT | Mod: PN

## 2021-09-19 ENCOUNTER — LAB VISIT (OUTPATIENT)
Dept: FAMILY MEDICINE | Facility: CLINIC | Age: 66
End: 2021-09-19
Payer: MEDICARE

## 2021-09-19 DIAGNOSIS — M46.1 SACROILIITIS: ICD-10-CM

## 2021-09-19 PROCEDURE — U0003 INFECTIOUS AGENT DETECTION BY NUCLEIC ACID (DNA OR RNA); SEVERE ACUTE RESPIRATORY SYNDROME CORONAVIRUS 2 (SARS-COV-2) (CORONAVIRUS DISEASE [COVID-19]), AMPLIFIED PROBE TECHNIQUE, MAKING USE OF HIGH THROUGHPUT TECHNOLOGIES AS DESCRIBED BY CMS-2020-01-R: HCPCS | Performed by: NURSE PRACTITIONER

## 2021-09-19 PROCEDURE — U0005 INFEC AGEN DETEC AMPLI PROBE: HCPCS | Performed by: NURSE PRACTITIONER

## 2021-09-20 LAB
SARS-COV-2 RNA RESP QL NAA+PROBE: NOT DETECTED
SARS-COV-2- CYCLE NUMBER: NORMAL

## 2021-09-21 ENCOUNTER — CLINICAL SUPPORT (OUTPATIENT)
Dept: REHABILITATION | Facility: HOSPITAL | Age: 66
End: 2021-09-21
Payer: MEDICARE

## 2021-09-21 DIAGNOSIS — M54.2 CHRONIC CERVICAL PAIN: Primary | ICD-10-CM

## 2021-09-21 DIAGNOSIS — R68.89 ACTIVITY INTOLERANCE: ICD-10-CM

## 2021-09-21 DIAGNOSIS — G89.29 CHRONIC CERVICAL PAIN: Primary | ICD-10-CM

## 2021-09-21 PROCEDURE — 97110 THERAPEUTIC EXERCISES: CPT | Mod: PN,CQ

## 2021-09-21 PROCEDURE — 97140 MANUAL THERAPY 1/> REGIONS: CPT | Mod: PN,CQ

## 2021-09-22 ENCOUNTER — HOSPITAL ENCOUNTER (OUTPATIENT)
Facility: HOSPITAL | Age: 66
Discharge: HOME | End: 2021-09-22
Attending: ANESTHESIOLOGY | Admitting: ANESTHESIOLOGY
Payer: MEDICARE

## 2021-09-22 VITALS
RESPIRATION RATE: 12 BRPM | SYSTOLIC BLOOD PRESSURE: 101 MMHG | WEIGHT: 177 LBS | TEMPERATURE: 98 F | HEART RATE: 59 BPM | BODY MASS INDEX: 27.78 KG/M2 | HEIGHT: 67 IN | DIASTOLIC BLOOD PRESSURE: 62 MMHG | OXYGEN SATURATION: 96 %

## 2021-09-22 DIAGNOSIS — M53.3 SACROILIAC JOINT DYSFUNCTION: ICD-10-CM

## 2021-09-22 DIAGNOSIS — M96.1 FAILED BACK SYNDROME OF LUMBAR SPINE: Primary | ICD-10-CM

## 2021-09-22 PROCEDURE — 63600175 PHARM REV CODE 636 W HCPCS: Performed by: ANESTHESIOLOGY

## 2021-09-22 PROCEDURE — 27096 INJECT SACROILIAC JOINT: CPT | Mod: RT | Performed by: ANESTHESIOLOGY

## 2021-09-22 PROCEDURE — 27096 INJECT SACROILIAC JOINT: CPT | Mod: RT,,, | Performed by: ANESTHESIOLOGY

## 2021-09-22 PROCEDURE — 27096 PR INJECTION,SACROILIAC JOINT: ICD-10-PCS | Mod: RT,,, | Performed by: ANESTHESIOLOGY

## 2021-09-22 RX ORDER — SODIUM CHLORIDE, SODIUM LACTATE, POTASSIUM CHLORIDE, CALCIUM CHLORIDE 600; 310; 30; 20 MG/100ML; MG/100ML; MG/100ML; MG/100ML
INJECTION, SOLUTION INTRAVENOUS ONCE AS NEEDED
Status: DISCONTINUED | OUTPATIENT
Start: 2021-09-22 | End: 2021-09-22 | Stop reason: HOSPADM

## 2021-09-22 RX ORDER — METHYLPREDNISOLONE ACETATE 80 MG/ML
INJECTION, SUSPENSION INTRA-ARTICULAR; INTRALESIONAL; INTRAMUSCULAR; SOFT TISSUE
Status: DISCONTINUED
Start: 2021-09-22 | End: 2021-09-22 | Stop reason: HOSPADM

## 2021-09-22 RX ORDER — BUPIVACAINE HYDROCHLORIDE 5 MG/ML
INJECTION, SOLUTION EPIDURAL; INTRACAUDAL
Status: DISCONTINUED
Start: 2021-09-22 | End: 2021-09-22 | Stop reason: HOSPADM

## 2021-09-22 RX ORDER — FENTANYL CITRATE 50 UG/ML
INJECTION, SOLUTION INTRAMUSCULAR; INTRAVENOUS
Status: DISCONTINUED | OUTPATIENT
Start: 2021-09-22 | End: 2021-09-22 | Stop reason: HOSPADM

## 2021-09-22 RX ORDER — LIDOCAINE HYDROCHLORIDE 10 MG/ML
INJECTION INFILTRATION; PERINEURAL
Status: DISCONTINUED
Start: 2021-09-22 | End: 2021-09-22 | Stop reason: HOSPADM

## 2021-09-22 RX ORDER — FENTANYL CITRATE 50 UG/ML
INJECTION, SOLUTION INTRAMUSCULAR; INTRAVENOUS
Status: DISCONTINUED
Start: 2021-09-22 | End: 2021-09-22 | Stop reason: HOSPADM

## 2021-09-22 RX ORDER — MIDAZOLAM HYDROCHLORIDE 1 MG/ML
INJECTION INTRAMUSCULAR; INTRAVENOUS
Status: DISCONTINUED
Start: 2021-09-22 | End: 2021-09-22 | Stop reason: HOSPADM

## 2021-09-22 RX ORDER — MIDAZOLAM HYDROCHLORIDE 5 MG/ML
INJECTION INTRAMUSCULAR; INTRAVENOUS
Status: DISCONTINUED | OUTPATIENT
Start: 2021-09-22 | End: 2021-09-22 | Stop reason: HOSPADM

## 2021-09-28 ENCOUNTER — CLINICAL SUPPORT (OUTPATIENT)
Dept: REHABILITATION | Facility: HOSPITAL | Age: 66
End: 2021-09-28
Payer: MEDICARE

## 2021-09-28 DIAGNOSIS — G89.29 CHRONIC CERVICAL PAIN: Primary | ICD-10-CM

## 2021-09-28 DIAGNOSIS — R68.89 ACTIVITY INTOLERANCE: ICD-10-CM

## 2021-09-28 DIAGNOSIS — M54.2 CHRONIC CERVICAL PAIN: Primary | ICD-10-CM

## 2021-09-28 PROCEDURE — 97110 THERAPEUTIC EXERCISES: CPT | Mod: PN,CQ

## 2021-09-30 ENCOUNTER — CLINICAL SUPPORT (OUTPATIENT)
Dept: REHABILITATION | Facility: HOSPITAL | Age: 66
End: 2021-09-30
Payer: MEDICARE

## 2021-09-30 ENCOUNTER — TELEPHONE (OUTPATIENT)
Dept: ENDOSCOPY | Facility: HOSPITAL | Age: 66
End: 2021-09-30

## 2021-09-30 DIAGNOSIS — M54.2 CHRONIC CERVICAL PAIN: ICD-10-CM

## 2021-09-30 DIAGNOSIS — G89.29 CHRONIC CERVICAL PAIN: ICD-10-CM

## 2021-09-30 DIAGNOSIS — R68.89 ACTIVITY INTOLERANCE: ICD-10-CM

## 2021-09-30 PROCEDURE — 97140 MANUAL THERAPY 1/> REGIONS: CPT | Mod: PN

## 2021-09-30 PROCEDURE — 97110 THERAPEUTIC EXERCISES: CPT | Mod: PN

## 2021-10-05 ENCOUNTER — CLINICAL SUPPORT (OUTPATIENT)
Dept: REHABILITATION | Facility: HOSPITAL | Age: 66
End: 2021-10-05
Payer: MEDICARE

## 2021-10-05 DIAGNOSIS — G89.29 CHRONIC CERVICAL PAIN: Primary | ICD-10-CM

## 2021-10-05 DIAGNOSIS — R68.89 ACTIVITY INTOLERANCE: ICD-10-CM

## 2021-10-05 DIAGNOSIS — M54.2 CHRONIC CERVICAL PAIN: Primary | ICD-10-CM

## 2021-10-05 PROCEDURE — 97110 THERAPEUTIC EXERCISES: CPT | Mod: PN,CQ

## 2021-10-07 ENCOUNTER — CLINICAL SUPPORT (OUTPATIENT)
Dept: REHABILITATION | Facility: HOSPITAL | Age: 66
End: 2021-10-07
Payer: MEDICARE

## 2021-10-07 ENCOUNTER — TELEPHONE (OUTPATIENT)
Dept: ENDOSCOPY | Facility: HOSPITAL | Age: 66
End: 2021-10-07

## 2021-10-07 DIAGNOSIS — R68.89 ACTIVITY INTOLERANCE: ICD-10-CM

## 2021-10-07 DIAGNOSIS — G89.29 CHRONIC CERVICAL PAIN: Primary | ICD-10-CM

## 2021-10-07 DIAGNOSIS — M54.2 CHRONIC CERVICAL PAIN: Primary | ICD-10-CM

## 2021-10-07 PROCEDURE — 97140 MANUAL THERAPY 1/> REGIONS: CPT | Mod: PN,CQ

## 2021-10-07 PROCEDURE — 97110 THERAPEUTIC EXERCISES: CPT | Mod: PN,CQ

## 2021-10-11 ENCOUNTER — TELEPHONE (OUTPATIENT)
Dept: PAIN MEDICINE | Facility: CLINIC | Age: 66
End: 2021-10-11

## 2021-10-11 DIAGNOSIS — M96.1 FAILED BACK SYNDROME OF LUMBAR SPINE: ICD-10-CM

## 2021-10-11 DIAGNOSIS — M48.07 SPINAL STENOSIS, LUMBOSACRAL REGION: ICD-10-CM

## 2021-10-11 DIAGNOSIS — M43.07 LUMBOSACRAL SPONDYLOLYSIS: ICD-10-CM

## 2021-10-11 DIAGNOSIS — M96.1 POSTLAMINECTOMY SYNDROME OF LUMBAR REGION: Primary | ICD-10-CM

## 2021-10-11 RX ORDER — OXYCODONE AND ACETAMINOPHEN 10; 325 MG/1; MG/1
1 TABLET ORAL EVERY 8 HOURS PRN
Qty: 90 TABLET | Refills: 0 | Status: SHIPPED | OUTPATIENT
Start: 2021-10-11 | End: 2022-03-17 | Stop reason: SDUPTHER

## 2021-10-12 ENCOUNTER — CLINICAL SUPPORT (OUTPATIENT)
Dept: REHABILITATION | Facility: HOSPITAL | Age: 66
End: 2021-10-12
Payer: MEDICARE

## 2021-10-12 DIAGNOSIS — G89.29 CHRONIC CERVICAL PAIN: ICD-10-CM

## 2021-10-12 DIAGNOSIS — R68.89 ACTIVITY INTOLERANCE: ICD-10-CM

## 2021-10-12 DIAGNOSIS — M54.2 CHRONIC CERVICAL PAIN: ICD-10-CM

## 2021-10-12 PROCEDURE — 97140 MANUAL THERAPY 1/> REGIONS: CPT | Mod: PN,CQ

## 2021-10-12 PROCEDURE — 97110 THERAPEUTIC EXERCISES: CPT | Mod: PN,CQ

## 2021-10-14 ENCOUNTER — CLINICAL SUPPORT (OUTPATIENT)
Dept: REHABILITATION | Facility: HOSPITAL | Age: 66
End: 2021-10-14
Payer: MEDICARE

## 2021-10-14 DIAGNOSIS — R68.89 ACTIVITY INTOLERANCE: ICD-10-CM

## 2021-10-14 DIAGNOSIS — M54.2 CHRONIC CERVICAL PAIN: ICD-10-CM

## 2021-10-14 DIAGNOSIS — G89.29 CHRONIC CERVICAL PAIN: ICD-10-CM

## 2021-10-14 PROCEDURE — 97140 MANUAL THERAPY 1/> REGIONS: CPT | Mod: KX,PN

## 2021-10-18 DIAGNOSIS — K21.9 GASTROESOPHAGEAL REFLUX DISEASE: ICD-10-CM

## 2021-10-18 RX ORDER — PANTOPRAZOLE SODIUM 40 MG/1
40 TABLET, DELAYED RELEASE ORAL DAILY
Qty: 90 TABLET | Refills: 3 | Status: SHIPPED | OUTPATIENT
Start: 2021-10-18 | End: 2021-10-25

## 2021-10-19 ENCOUNTER — CLINICAL SUPPORT (OUTPATIENT)
Dept: REHABILITATION | Facility: HOSPITAL | Age: 66
End: 2021-10-19
Payer: MEDICARE

## 2021-10-19 DIAGNOSIS — M54.2 CHRONIC CERVICAL PAIN: ICD-10-CM

## 2021-10-19 DIAGNOSIS — G89.29 CHRONIC CERVICAL PAIN: ICD-10-CM

## 2021-10-19 DIAGNOSIS — R68.89 ACTIVITY INTOLERANCE: ICD-10-CM

## 2021-10-19 PROCEDURE — 97110 THERAPEUTIC EXERCISES: CPT | Mod: PN,CQ

## 2021-10-19 PROCEDURE — 97140 MANUAL THERAPY 1/> REGIONS: CPT | Mod: PN,CQ

## 2021-10-22 ENCOUNTER — CLINICAL SUPPORT (OUTPATIENT)
Dept: REHABILITATION | Facility: HOSPITAL | Age: 66
End: 2021-10-22
Payer: MEDICARE

## 2021-10-22 DIAGNOSIS — M54.2 CHRONIC CERVICAL PAIN: Primary | ICD-10-CM

## 2021-10-22 DIAGNOSIS — G89.29 CHRONIC CERVICAL PAIN: Primary | ICD-10-CM

## 2021-10-22 DIAGNOSIS — R68.89 ACTIVITY INTOLERANCE: ICD-10-CM

## 2021-10-22 PROCEDURE — 97110 THERAPEUTIC EXERCISES: CPT | Mod: PN,CQ

## 2021-10-22 PROCEDURE — 97140 MANUAL THERAPY 1/> REGIONS: CPT | Mod: PN,CQ

## 2021-10-25 ENCOUNTER — PATIENT OUTREACH (OUTPATIENT)
Dept: ADMINISTRATIVE | Facility: OTHER | Age: 66
End: 2021-10-25
Payer: MEDICARE

## 2021-10-27 ENCOUNTER — CLINICAL SUPPORT (OUTPATIENT)
Dept: REHABILITATION | Facility: HOSPITAL | Age: 66
End: 2021-10-27
Payer: MEDICARE

## 2021-10-27 DIAGNOSIS — G89.29 CHRONIC CERVICAL PAIN: ICD-10-CM

## 2021-10-27 DIAGNOSIS — M54.2 CHRONIC CERVICAL PAIN: ICD-10-CM

## 2021-10-27 DIAGNOSIS — R68.89 ACTIVITY INTOLERANCE: ICD-10-CM

## 2021-10-27 PROCEDURE — 97035 APP MDLTY 1+ULTRASOUND EA 15: CPT | Mod: KX,PN

## 2021-10-27 PROCEDURE — 97140 MANUAL THERAPY 1/> REGIONS: CPT | Mod: KX,PN

## 2021-10-28 ENCOUNTER — OFFICE VISIT (OUTPATIENT)
Dept: GASTROENTEROLOGY | Facility: CLINIC | Age: 66
End: 2021-10-28
Payer: MEDICARE

## 2021-10-28 ENCOUNTER — OFFICE VISIT (OUTPATIENT)
Dept: PAIN MEDICINE | Facility: CLINIC | Age: 66
End: 2021-10-28
Payer: MEDICARE

## 2021-10-28 VITALS
SYSTOLIC BLOOD PRESSURE: 131 MMHG | BODY MASS INDEX: 27.37 KG/M2 | HEIGHT: 67 IN | WEIGHT: 174.38 LBS | HEART RATE: 57 BPM | DIASTOLIC BLOOD PRESSURE: 64 MMHG

## 2021-10-28 VITALS
WEIGHT: 177 LBS | DIASTOLIC BLOOD PRESSURE: 67 MMHG | SYSTOLIC BLOOD PRESSURE: 125 MMHG | HEIGHT: 67 IN | HEART RATE: 53 BPM | OXYGEN SATURATION: 99 % | TEMPERATURE: 99 F | BODY MASS INDEX: 27.78 KG/M2

## 2021-10-28 DIAGNOSIS — K63.89 COLONIC MASS: Primary | ICD-10-CM

## 2021-10-28 DIAGNOSIS — M50.30 DDD (DEGENERATIVE DISC DISEASE), CERVICAL: Primary | ICD-10-CM

## 2021-10-28 DIAGNOSIS — M96.1 FAILED BACK SYNDROME OF LUMBAR SPINE: ICD-10-CM

## 2021-10-28 DIAGNOSIS — M47.892 OTHER SPONDYLOSIS, CERVICAL REGION: ICD-10-CM

## 2021-10-28 DIAGNOSIS — M54.12 CERVICAL RADICULOPATHY: ICD-10-CM

## 2021-10-28 PROCEDURE — 3008F BODY MASS INDEX DOCD: CPT | Mod: CPTII,S$GLB,, | Performed by: ANESTHESIOLOGY

## 2021-10-28 PROCEDURE — 3288F FALL RISK ASSESSMENT DOCD: CPT | Mod: CPTII,S$GLB,, | Performed by: INTERNAL MEDICINE

## 2021-10-28 PROCEDURE — 3288F PR FALLS RISK ASSESSMENT DOCUMENTED: ICD-10-PCS | Mod: CPTII,S$GLB,, | Performed by: INTERNAL MEDICINE

## 2021-10-28 PROCEDURE — 3078F PR MOST RECENT DIASTOLIC BLOOD PRESSURE < 80 MM HG: ICD-10-PCS | Mod: CPTII,S$GLB,, | Performed by: ANESTHESIOLOGY

## 2021-10-28 PROCEDURE — 99214 OFFICE O/P EST MOD 30 MIN: CPT | Mod: S$GLB,,, | Performed by: ANESTHESIOLOGY

## 2021-10-28 PROCEDURE — 1100F PR PT FALLS ASSESS DOC 2+ FALLS/FALL W/INJURY/YR: ICD-10-PCS | Mod: CPTII,S$GLB,, | Performed by: INTERNAL MEDICINE

## 2021-10-28 PROCEDURE — 3078F DIAST BP <80 MM HG: CPT | Mod: CPTII,S$GLB,, | Performed by: INTERNAL MEDICINE

## 2021-10-28 PROCEDURE — 3008F PR BODY MASS INDEX (BMI) DOCUMENTED: ICD-10-PCS | Mod: CPTII,S$GLB,, | Performed by: ANESTHESIOLOGY

## 2021-10-28 PROCEDURE — 1101F PR PT FALLS ASSESS DOC 0-1 FALLS W/OUT INJ PAST YR: ICD-10-PCS | Mod: CPTII,S$GLB,, | Performed by: ANESTHESIOLOGY

## 2021-10-28 PROCEDURE — 4010F ACE/ARB THERAPY RXD/TAKEN: CPT | Mod: CPTII,S$GLB,, | Performed by: INTERNAL MEDICINE

## 2021-10-28 PROCEDURE — 3008F PR BODY MASS INDEX (BMI) DOCUMENTED: ICD-10-PCS | Mod: CPTII,S$GLB,, | Performed by: INTERNAL MEDICINE

## 2021-10-28 PROCEDURE — 3078F PR MOST RECENT DIASTOLIC BLOOD PRESSURE < 80 MM HG: ICD-10-PCS | Mod: CPTII,S$GLB,, | Performed by: INTERNAL MEDICINE

## 2021-10-28 PROCEDURE — 99999 PR PBB SHADOW E&M-EST. PATIENT-LVL IV: CPT | Mod: PBBFAC,,, | Performed by: INTERNAL MEDICINE

## 2021-10-28 PROCEDURE — 4010F ACE/ARB THERAPY RXD/TAKEN: CPT | Mod: CPTII,S$GLB,, | Performed by: ANESTHESIOLOGY

## 2021-10-28 PROCEDURE — 3074F SYST BP LT 130 MM HG: CPT | Mod: CPTII,S$GLB,, | Performed by: ANESTHESIOLOGY

## 2021-10-28 PROCEDURE — 99214 OFFICE O/P EST MOD 30 MIN: CPT | Mod: S$GLB,,, | Performed by: INTERNAL MEDICINE

## 2021-10-28 PROCEDURE — 3288F FALL RISK ASSESSMENT DOCD: CPT | Mod: CPTII,S$GLB,, | Performed by: ANESTHESIOLOGY

## 2021-10-28 PROCEDURE — 1100F PTFALLS ASSESS-DOCD GE2>/YR: CPT | Mod: CPTII,S$GLB,, | Performed by: INTERNAL MEDICINE

## 2021-10-28 PROCEDURE — 3075F SYST BP GE 130 - 139MM HG: CPT | Mod: CPTII,S$GLB,, | Performed by: INTERNAL MEDICINE

## 2021-10-28 PROCEDURE — 3075F PR MOST RECENT SYSTOLIC BLOOD PRESS GE 130-139MM HG: ICD-10-PCS | Mod: CPTII,S$GLB,, | Performed by: INTERNAL MEDICINE

## 2021-10-28 PROCEDURE — 1159F PR MEDICATION LIST DOCUMENTED IN MEDICAL RECORD: ICD-10-PCS | Mod: CPTII,S$GLB,, | Performed by: INTERNAL MEDICINE

## 2021-10-28 PROCEDURE — 3078F DIAST BP <80 MM HG: CPT | Mod: CPTII,S$GLB,, | Performed by: ANESTHESIOLOGY

## 2021-10-28 PROCEDURE — 4010F PR ACE/ARB THEARPY RXD/TAKEN: ICD-10-PCS | Mod: CPTII,S$GLB,, | Performed by: ANESTHESIOLOGY

## 2021-10-28 PROCEDURE — 4010F PR ACE/ARB THEARPY RXD/TAKEN: ICD-10-PCS | Mod: CPTII,S$GLB,, | Performed by: INTERNAL MEDICINE

## 2021-10-28 PROCEDURE — 3074F PR MOST RECENT SYSTOLIC BLOOD PRESSURE < 130 MM HG: ICD-10-PCS | Mod: CPTII,S$GLB,, | Performed by: ANESTHESIOLOGY

## 2021-10-28 PROCEDURE — 99214 PR OFFICE/OUTPT VISIT, EST, LEVL IV, 30-39 MIN: ICD-10-PCS | Mod: S$GLB,,, | Performed by: ANESTHESIOLOGY

## 2021-10-28 PROCEDURE — 99214 PR OFFICE/OUTPT VISIT, EST, LEVL IV, 30-39 MIN: ICD-10-PCS | Mod: S$GLB,,, | Performed by: INTERNAL MEDICINE

## 2021-10-28 PROCEDURE — 99999 PR PBB SHADOW E&M-EST. PATIENT-LVL IV: ICD-10-PCS | Mod: PBBFAC,,, | Performed by: INTERNAL MEDICINE

## 2021-10-28 PROCEDURE — 3288F PR FALLS RISK ASSESSMENT DOCUMENTED: ICD-10-PCS | Mod: CPTII,S$GLB,, | Performed by: ANESTHESIOLOGY

## 2021-10-28 PROCEDURE — 3008F BODY MASS INDEX DOCD: CPT | Mod: CPTII,S$GLB,, | Performed by: INTERNAL MEDICINE

## 2021-10-28 PROCEDURE — 99999 PR PBB SHADOW E&M-EST. PATIENT-LVL III: CPT | Mod: PBBFAC,,, | Performed by: ANESTHESIOLOGY

## 2021-10-28 PROCEDURE — 1159F MED LIST DOCD IN RCRD: CPT | Mod: CPTII,S$GLB,, | Performed by: INTERNAL MEDICINE

## 2021-10-28 PROCEDURE — 99999 PR PBB SHADOW E&M-EST. PATIENT-LVL III: ICD-10-PCS | Mod: PBBFAC,,, | Performed by: ANESTHESIOLOGY

## 2021-10-28 PROCEDURE — 1101F PT FALLS ASSESS-DOCD LE1/YR: CPT | Mod: CPTII,S$GLB,, | Performed by: ANESTHESIOLOGY

## 2021-10-29 ENCOUNTER — TELEPHONE (OUTPATIENT)
Dept: GASTROENTEROLOGY | Facility: CLINIC | Age: 66
End: 2021-10-29
Payer: MEDICARE

## 2021-10-29 ENCOUNTER — TELEPHONE (OUTPATIENT)
Dept: ENDOSCOPY | Facility: HOSPITAL | Age: 66
End: 2021-10-29
Payer: MEDICARE

## 2021-10-29 DIAGNOSIS — K63.89 COLONIC MASS: Primary | ICD-10-CM

## 2021-11-03 ENCOUNTER — CLINICAL SUPPORT (OUTPATIENT)
Dept: REHABILITATION | Facility: HOSPITAL | Age: 66
End: 2021-11-03
Payer: MEDICARE

## 2021-11-03 DIAGNOSIS — R68.89 ACTIVITY INTOLERANCE: ICD-10-CM

## 2021-11-03 DIAGNOSIS — M54.2 CHRONIC CERVICAL PAIN: ICD-10-CM

## 2021-11-03 DIAGNOSIS — G89.29 CHRONIC CERVICAL PAIN: ICD-10-CM

## 2021-11-03 PROCEDURE — 97140 MANUAL THERAPY 1/> REGIONS: CPT | Mod: KX,PN

## 2021-11-05 ENCOUNTER — CLINICAL SUPPORT (OUTPATIENT)
Dept: REHABILITATION | Facility: HOSPITAL | Age: 66
End: 2021-11-05
Payer: MEDICARE

## 2021-11-05 DIAGNOSIS — R68.89 ACTIVITY INTOLERANCE: ICD-10-CM

## 2021-11-05 DIAGNOSIS — M54.2 CHRONIC CERVICAL PAIN: ICD-10-CM

## 2021-11-05 DIAGNOSIS — G89.29 CHRONIC CERVICAL PAIN: ICD-10-CM

## 2021-11-05 PROCEDURE — 97140 MANUAL THERAPY 1/> REGIONS: CPT | Mod: KX,PN

## 2021-11-08 ENCOUNTER — HOSPITAL ENCOUNTER (OUTPATIENT)
Dept: RADIOLOGY | Facility: HOSPITAL | Age: 66
Discharge: HOME | End: 2021-11-08
Attending: ANESTHESIOLOGY
Payer: MEDICARE

## 2021-11-08 DIAGNOSIS — M47.892 OTHER SPONDYLOSIS, CERVICAL REGION: ICD-10-CM

## 2021-11-08 DIAGNOSIS — M54.12 CERVICAL RADICULOPATHY: ICD-10-CM

## 2021-11-08 DIAGNOSIS — M50.30 DDD (DEGENERATIVE DISC DISEASE), CERVICAL: ICD-10-CM

## 2021-11-08 PROCEDURE — 72125 CT NECK SPINE W/O DYE: CPT | Mod: TC,PN

## 2021-11-08 PROCEDURE — 72125 CT CERVICAL SPINE WITHOUT CONTRAST: ICD-10-PCS | Mod: 26,,, | Performed by: RADIOLOGY

## 2021-11-08 PROCEDURE — 72125 CT NECK SPINE W/O DYE: CPT | Mod: 26,,, | Performed by: RADIOLOGY

## 2021-11-10 ENCOUNTER — CLINICAL SUPPORT (OUTPATIENT)
Dept: REHABILITATION | Facility: HOSPITAL | Age: 66
End: 2021-11-10
Payer: MEDICARE

## 2021-11-10 DIAGNOSIS — R68.89 ACTIVITY INTOLERANCE: ICD-10-CM

## 2021-11-10 DIAGNOSIS — G89.29 CHRONIC CERVICAL PAIN: ICD-10-CM

## 2021-11-10 DIAGNOSIS — M54.2 CHRONIC CERVICAL PAIN: ICD-10-CM

## 2021-11-10 PROCEDURE — 97140 MANUAL THERAPY 1/> REGIONS: CPT | Mod: KX,PN

## 2021-11-11 ENCOUNTER — TELEPHONE (OUTPATIENT)
Dept: ENDOSCOPY | Facility: HOSPITAL | Age: 66
End: 2021-11-11
Payer: MEDICARE

## 2021-11-16 ENCOUNTER — TELEPHONE (OUTPATIENT)
Dept: ENDOSCOPY | Facility: HOSPITAL | Age: 66
End: 2021-11-16
Payer: MEDICARE

## 2021-11-16 ENCOUNTER — IMMUNIZATION (OUTPATIENT)
Dept: FAMILY MEDICINE | Facility: CLINIC | Age: 66
End: 2021-11-16
Payer: MEDICARE

## 2021-11-16 ENCOUNTER — OFFICE VISIT (OUTPATIENT)
Dept: GASTROENTEROLOGY | Facility: CLINIC | Age: 66
End: 2021-11-16
Payer: MEDICARE

## 2021-11-16 VITALS
HEIGHT: 67 IN | SYSTOLIC BLOOD PRESSURE: 108 MMHG | DIASTOLIC BLOOD PRESSURE: 54 MMHG | HEART RATE: 54 BPM | WEIGHT: 173.94 LBS | BODY MASS INDEX: 27.3 KG/M2

## 2021-11-16 DIAGNOSIS — R10.13 EPIGASTRIC PAIN: ICD-10-CM

## 2021-11-16 DIAGNOSIS — K52.9 CHRONIC DIARRHEA: ICD-10-CM

## 2021-11-16 DIAGNOSIS — K43.9 VENTRAL HERNIA WITHOUT OBSTRUCTION OR GANGRENE: ICD-10-CM

## 2021-11-16 DIAGNOSIS — R15.1 FECAL SMEARING: Primary | ICD-10-CM

## 2021-11-16 DIAGNOSIS — Z23 NEED FOR VACCINATION: Primary | ICD-10-CM

## 2021-11-16 DIAGNOSIS — K86.89 PANCREATIC INSUFFICIENCY: ICD-10-CM

## 2021-11-16 PROCEDURE — 3078F DIAST BP <80 MM HG: CPT | Mod: CPTII,S$GLB,, | Performed by: INTERNAL MEDICINE

## 2021-11-16 PROCEDURE — 99214 PR OFFICE/OUTPT VISIT, EST, LEVL IV, 30-39 MIN: ICD-10-PCS | Mod: S$GLB,,, | Performed by: INTERNAL MEDICINE

## 2021-11-16 PROCEDURE — 3074F SYST BP LT 130 MM HG: CPT | Mod: CPTII,S$GLB,, | Performed by: INTERNAL MEDICINE

## 2021-11-16 PROCEDURE — 4010F ACE/ARB THERAPY RXD/TAKEN: CPT | Mod: CPTII,S$GLB,, | Performed by: INTERNAL MEDICINE

## 2021-11-16 PROCEDURE — 1159F MED LIST DOCD IN RCRD: CPT | Mod: CPTII,S$GLB,, | Performed by: INTERNAL MEDICINE

## 2021-11-16 PROCEDURE — 4010F PR ACE/ARB THEARPY RXD/TAKEN: ICD-10-PCS | Mod: CPTII,S$GLB,, | Performed by: INTERNAL MEDICINE

## 2021-11-16 PROCEDURE — 3008F PR BODY MASS INDEX (BMI) DOCUMENTED: ICD-10-PCS | Mod: CPTII,S$GLB,, | Performed by: INTERNAL MEDICINE

## 2021-11-16 PROCEDURE — 3074F PR MOST RECENT SYSTOLIC BLOOD PRESSURE < 130 MM HG: ICD-10-PCS | Mod: CPTII,S$GLB,, | Performed by: INTERNAL MEDICINE

## 2021-11-16 PROCEDURE — 1101F PR PT FALLS ASSESS DOC 0-1 FALLS W/OUT INJ PAST YR: ICD-10-PCS | Mod: CPTII,S$GLB,, | Performed by: INTERNAL MEDICINE

## 2021-11-16 PROCEDURE — 3078F PR MOST RECENT DIASTOLIC BLOOD PRESSURE < 80 MM HG: ICD-10-PCS | Mod: CPTII,S$GLB,, | Performed by: INTERNAL MEDICINE

## 2021-11-16 PROCEDURE — 0013A COVID-19, MRNA, LNP-S, PF, 100 MCG/0.5 ML DOSE VACCINE: CPT | Mod: PBBFAC | Performed by: FAMILY MEDICINE

## 2021-11-16 PROCEDURE — 99999 PR PBB SHADOW E&M-EST. PATIENT-LVL V: ICD-10-PCS | Mod: PBBFAC,,, | Performed by: INTERNAL MEDICINE

## 2021-11-16 PROCEDURE — 99214 OFFICE O/P EST MOD 30 MIN: CPT | Mod: S$GLB,,, | Performed by: INTERNAL MEDICINE

## 2021-11-16 PROCEDURE — 1160F PR REVIEW ALL MEDS BY PRESCRIBER/CLIN PHARMACIST DOCUMENTED: ICD-10-PCS | Mod: CPTII,S$GLB,, | Performed by: INTERNAL MEDICINE

## 2021-11-16 PROCEDURE — 3288F FALL RISK ASSESSMENT DOCD: CPT | Mod: CPTII,S$GLB,, | Performed by: INTERNAL MEDICINE

## 2021-11-16 PROCEDURE — 3008F BODY MASS INDEX DOCD: CPT | Mod: CPTII,S$GLB,, | Performed by: INTERNAL MEDICINE

## 2021-11-16 PROCEDURE — 1160F RVW MEDS BY RX/DR IN RCRD: CPT | Mod: CPTII,S$GLB,, | Performed by: INTERNAL MEDICINE

## 2021-11-16 PROCEDURE — 3288F PR FALLS RISK ASSESSMENT DOCUMENTED: ICD-10-PCS | Mod: CPTII,S$GLB,, | Performed by: INTERNAL MEDICINE

## 2021-11-16 PROCEDURE — 91301 COVID-19, MRNA, LNP-S, PF, 100 MCG/0.5 ML DOSE VACCINE: CPT | Mod: PBBFAC | Performed by: FAMILY MEDICINE

## 2021-11-16 PROCEDURE — 1101F PT FALLS ASSESS-DOCD LE1/YR: CPT | Mod: CPTII,S$GLB,, | Performed by: INTERNAL MEDICINE

## 2021-11-16 PROCEDURE — 1159F PR MEDICATION LIST DOCUMENTED IN MEDICAL RECORD: ICD-10-PCS | Mod: CPTII,S$GLB,, | Performed by: INTERNAL MEDICINE

## 2021-11-16 PROCEDURE — 99999 PR PBB SHADOW E&M-EST. PATIENT-LVL V: CPT | Mod: PBBFAC,,, | Performed by: INTERNAL MEDICINE

## 2021-11-17 DIAGNOSIS — G45.9 TIA (TRANSIENT ISCHEMIC ATTACK): Primary | ICD-10-CM

## 2021-11-18 ENCOUNTER — HOSPITAL ENCOUNTER (OUTPATIENT)
Dept: RADIOLOGY | Facility: HOSPITAL | Age: 66
Discharge: HOME | End: 2021-11-18
Attending: INTERNAL MEDICINE
Payer: MEDICARE

## 2021-11-18 ENCOUNTER — OFFICE VISIT (OUTPATIENT)
Dept: PAIN MEDICINE | Facility: CLINIC | Age: 66
End: 2021-11-18
Payer: MEDICARE

## 2021-11-18 ENCOUNTER — TELEPHONE (OUTPATIENT)
Dept: PAIN MEDICINE | Facility: CLINIC | Age: 66
End: 2021-11-18

## 2021-11-18 VITALS
HEIGHT: 67 IN | HEART RATE: 58 BPM | BODY MASS INDEX: 27.15 KG/M2 | WEIGHT: 173 LBS | SYSTOLIC BLOOD PRESSURE: 109 MMHG | DIASTOLIC BLOOD PRESSURE: 63 MMHG

## 2021-11-18 DIAGNOSIS — R10.13 EPIGASTRIC PAIN: ICD-10-CM

## 2021-11-18 DIAGNOSIS — M54.12 CERVICAL RADICULOPATHY: ICD-10-CM

## 2021-11-18 DIAGNOSIS — M47.892 OTHER SPONDYLOSIS, CERVICAL REGION: ICD-10-CM

## 2021-11-18 DIAGNOSIS — F11.90 CHRONIC, CONTINUOUS USE OF OPIOIDS: ICD-10-CM

## 2021-11-18 DIAGNOSIS — M50.30 DDD (DEGENERATIVE DISC DISEASE), CERVICAL: Primary | ICD-10-CM

## 2021-11-18 PROCEDURE — 99214 OFFICE O/P EST MOD 30 MIN: CPT | Mod: S$GLB,,, | Performed by: ANESTHESIOLOGY

## 2021-11-18 PROCEDURE — 99214 PR OFFICE/OUTPT VISIT, EST, LEVL IV, 30-39 MIN: ICD-10-PCS | Mod: S$GLB,,, | Performed by: ANESTHESIOLOGY

## 2021-11-18 PROCEDURE — 1159F MED LIST DOCD IN RCRD: CPT | Mod: CPTII,S$GLB,, | Performed by: ANESTHESIOLOGY

## 2021-11-18 PROCEDURE — 4010F PR ACE/ARB THEARPY RXD/TAKEN: ICD-10-PCS | Mod: CPTII,S$GLB,, | Performed by: ANESTHESIOLOGY

## 2021-11-18 PROCEDURE — 3288F FALL RISK ASSESSMENT DOCD: CPT | Mod: CPTII,S$GLB,, | Performed by: ANESTHESIOLOGY

## 2021-11-18 PROCEDURE — 1100F PR PT FALLS ASSESS DOC 2+ FALLS/FALL W/INJURY/YR: ICD-10-PCS | Mod: CPTII,S$GLB,, | Performed by: ANESTHESIOLOGY

## 2021-11-18 PROCEDURE — 25500020 PHARM REV CODE 255: Performed by: INTERNAL MEDICINE

## 2021-11-18 PROCEDURE — 3074F SYST BP LT 130 MM HG: CPT | Mod: CPTII,S$GLB,, | Performed by: ANESTHESIOLOGY

## 2021-11-18 PROCEDURE — 3074F PR MOST RECENT SYSTOLIC BLOOD PRESSURE < 130 MM HG: ICD-10-PCS | Mod: CPTII,S$GLB,, | Performed by: ANESTHESIOLOGY

## 2021-11-18 PROCEDURE — 3078F PR MOST RECENT DIASTOLIC BLOOD PRESSURE < 80 MM HG: ICD-10-PCS | Mod: CPTII,S$GLB,, | Performed by: ANESTHESIOLOGY

## 2021-11-18 PROCEDURE — 99999 PR PBB SHADOW E&M-EST. PATIENT-LVL V: CPT | Mod: PBBFAC,,, | Performed by: ANESTHESIOLOGY

## 2021-11-18 PROCEDURE — 3078F DIAST BP <80 MM HG: CPT | Mod: CPTII,S$GLB,, | Performed by: ANESTHESIOLOGY

## 2021-11-18 PROCEDURE — 3008F PR BODY MASS INDEX (BMI) DOCUMENTED: ICD-10-PCS | Mod: CPTII,S$GLB,, | Performed by: ANESTHESIOLOGY

## 2021-11-18 PROCEDURE — 99999 PR PBB SHADOW E&M-EST. PATIENT-LVL V: ICD-10-PCS | Mod: PBBFAC,,, | Performed by: ANESTHESIOLOGY

## 2021-11-18 PROCEDURE — A9698 NON-RAD CONTRAST MATERIALNOC: HCPCS | Performed by: INTERNAL MEDICINE

## 2021-11-18 PROCEDURE — 74177 CT ABDOMEN PELVIS WITH CONTRAST: ICD-10-PCS | Mod: 26,,, | Performed by: RADIOLOGY

## 2021-11-18 PROCEDURE — 1100F PTFALLS ASSESS-DOCD GE2>/YR: CPT | Mod: CPTII,S$GLB,, | Performed by: ANESTHESIOLOGY

## 2021-11-18 PROCEDURE — 3288F PR FALLS RISK ASSESSMENT DOCUMENTED: ICD-10-PCS | Mod: CPTII,S$GLB,, | Performed by: ANESTHESIOLOGY

## 2021-11-18 PROCEDURE — 74177 CT ABD & PELVIS W/CONTRAST: CPT | Mod: TC

## 2021-11-18 PROCEDURE — 1159F PR MEDICATION LIST DOCUMENTED IN MEDICAL RECORD: ICD-10-PCS | Mod: CPTII,S$GLB,, | Performed by: ANESTHESIOLOGY

## 2021-11-18 PROCEDURE — 3008F BODY MASS INDEX DOCD: CPT | Mod: CPTII,S$GLB,, | Performed by: ANESTHESIOLOGY

## 2021-11-18 PROCEDURE — 4010F ACE/ARB THERAPY RXD/TAKEN: CPT | Mod: CPTII,S$GLB,, | Performed by: ANESTHESIOLOGY

## 2021-11-18 PROCEDURE — 74177 CT ABD & PELVIS W/CONTRAST: CPT | Mod: 26,,, | Performed by: RADIOLOGY

## 2021-11-18 PROCEDURE — 80307 DRUG TEST PRSMV CHEM ANLYZR: CPT | Performed by: ANESTHESIOLOGY

## 2021-11-18 RX ORDER — PANCRELIPASE LIPASE, PANCRELIPASE PROTEASE, PANCRELIPASE AMYLASE 10000; 32000; 42000 [USP'U]/1; [USP'U]/1; [USP'U]/1
1 CAPSULE, DELAYED RELEASE ORAL
COMMUNITY
Start: 2021-10-12 | End: 2023-05-10

## 2021-11-18 RX ORDER — OXYCODONE AND ACETAMINOPHEN 10; 325 MG/1; MG/1
1 TABLET ORAL EVERY 8 HOURS PRN
Qty: 90 TABLET | Refills: 0 | Status: SHIPPED | OUTPATIENT
Start: 2021-11-18 | End: 2022-03-17 | Stop reason: SDUPTHER

## 2021-11-18 RX ADMIN — IOHEXOL 75 ML: 350 INJECTION, SOLUTION INTRAVENOUS at 01:11

## 2021-11-18 RX ADMIN — IOHEXOL 1000 ML: 9 SOLUTION ORAL at 01:11

## 2021-11-19 ENCOUNTER — TELEPHONE (OUTPATIENT)
Dept: CARDIOLOGY | Facility: CLINIC | Age: 66
End: 2021-11-19
Payer: MEDICARE

## 2021-11-22 ENCOUNTER — TELEPHONE (OUTPATIENT)
Dept: PAIN MEDICINE | Facility: CLINIC | Age: 66
End: 2021-11-22
Payer: MEDICARE

## 2021-11-23 ENCOUNTER — CLINICAL SUPPORT (OUTPATIENT)
Dept: REHABILITATION | Facility: HOSPITAL | Age: 66
End: 2021-11-23
Payer: MEDICARE

## 2021-11-23 DIAGNOSIS — M54.2 CHRONIC CERVICAL PAIN: ICD-10-CM

## 2021-11-23 DIAGNOSIS — R68.89 ACTIVITY INTOLERANCE: ICD-10-CM

## 2021-11-23 DIAGNOSIS — K43.9 VENTRAL HERNIA WITHOUT OBSTRUCTION OR GANGRENE: Primary | ICD-10-CM

## 2021-11-23 DIAGNOSIS — G89.29 CHRONIC CERVICAL PAIN: ICD-10-CM

## 2021-11-23 PROCEDURE — 97140 MANUAL THERAPY 1/> REGIONS: CPT | Mod: KX,PN

## 2021-11-25 LAB
6MAM UR QL: NOT DETECTED
7AMINOCLONAZEPAM UR QL: NOT DETECTED
A-OH ALPRAZ UR QL: NOT DETECTED
ALPHA-OH-MIDAZOLAM: NOT DETECTED
ALPRAZ UR QL: NOT DETECTED
AMPHET UR QL SCN: NOT DETECTED
ANNOTATION COMMENT IMP: NORMAL
ANNOTATION COMMENT IMP: NORMAL
BARBITURATES UR QL: NOT DETECTED
BUPRENORPHINE UR QL: NOT DETECTED
BZE UR QL: NOT DETECTED
CARBOXYTHC UR QL: NOT DETECTED
CARISOPRODOL UR QL: NOT DETECTED
CLONAZEPAM UR QL: NOT DETECTED
CODEINE UR QL: NOT DETECTED
CREAT UR-MCNC: 186.4 MG/DL (ref 20–400)
DIAZEPAM UR QL: NOT DETECTED
ETHYL GLUCURONIDE UR QL: NOT DETECTED
FENTANYL UR QL: NOT DETECTED
GABAPENTIN: PRESENT
HYDROCODONE UR QL: NOT DETECTED
HYDROMORPHONE UR QL: NOT DETECTED
LORAZEPAM UR QL: NOT DETECTED
MDA UR QL: NOT DETECTED
MDEA UR QL: NOT DETECTED
MDMA UR QL: NOT DETECTED
ME-PHENIDATE UR QL: NOT DETECTED
METHADONE UR QL: NOT DETECTED
METHAMPHET UR QL: NOT DETECTED
MIDAZOLAM UR QL SCN: NOT DETECTED
MORPHINE UR QL: NOT DETECTED
NALOXONE: NOT DETECTED
NORBUPRENORPHINE UR QL CFM: NOT DETECTED
NORDIAZEPAM UR QL: NOT DETECTED
NORFENTANYL UR QL: NOT DETECTED
NORHYDROCODONE UR QL CFM: NOT DETECTED
NORMEPERIDINE UR QL CFM: NOT DETECTED
NOROXYCODONE UR QL CFM: PRESENT
NOROXYMORPHONE UR QL SCN: NOT DETECTED
OXAZEPAM UR QL: NOT DETECTED
OXYCODONE UR QL: PRESENT
OXYMORPHONE UR QL: PRESENT
PATHOLOGY STUDY: NORMAL
PCP UR QL: NOT DETECTED
PHENTERMINE UR QL: NOT DETECTED
PREGABALIN: NOT DETECTED
SERVICE CMNT-IMP: NORMAL
TAPENTADOL UR QL SCN: NOT DETECTED
TAPENTADOL UR QL SCN: NOT DETECTED
TEMAZEPAM UR QL: NOT DETECTED
TRAMADOL UR QL: NOT DETECTED
ZOLPIDEM METABOLITE: NOT DETECTED
ZOLPIDEM UR QL: NOT DETECTED

## 2021-11-29 ENCOUNTER — OFFICE VISIT (OUTPATIENT)
Dept: CARDIOLOGY | Facility: CLINIC | Age: 66
End: 2021-11-29
Payer: MEDICARE

## 2021-11-29 ENCOUNTER — TELEPHONE (OUTPATIENT)
Dept: GASTROENTEROLOGY | Facility: CLINIC | Age: 66
End: 2021-11-29
Payer: MEDICARE

## 2021-11-29 VITALS
OXYGEN SATURATION: 98 % | HEART RATE: 59 BPM | WEIGHT: 175 LBS | BODY MASS INDEX: 27.47 KG/M2 | DIASTOLIC BLOOD PRESSURE: 60 MMHG | HEIGHT: 67 IN | SYSTOLIC BLOOD PRESSURE: 116 MMHG

## 2021-11-29 DIAGNOSIS — I10 ESSENTIAL HYPERTENSION: ICD-10-CM

## 2021-11-29 DIAGNOSIS — G45.9 TIA (TRANSIENT ISCHEMIC ATTACK): Primary | ICD-10-CM

## 2021-11-29 DIAGNOSIS — E03.9 HYPOTHYROIDISM, UNSPECIFIED TYPE: ICD-10-CM

## 2021-11-29 DIAGNOSIS — I65.23 CAROTID ARTERY OBSTRUCTION, BILATERAL: ICD-10-CM

## 2021-11-29 DIAGNOSIS — I95.1 ORTHOSTATIC HYPOTENSION: ICD-10-CM

## 2021-11-29 DIAGNOSIS — E78.00 HYPERCHOLESTEREMIA: ICD-10-CM

## 2021-11-29 DIAGNOSIS — I10 PRIMARY HYPERTENSION: ICD-10-CM

## 2021-11-29 PROCEDURE — 4010F PR ACE/ARB THEARPY RXD/TAKEN: ICD-10-PCS | Mod: CPTII,S$GLB,, | Performed by: SPECIALIST

## 2021-11-29 PROCEDURE — 4010F ACE/ARB THERAPY RXD/TAKEN: CPT | Mod: CPTII,S$GLB,, | Performed by: SPECIALIST

## 2021-11-29 PROCEDURE — 99215 PR OFFICE/OUTPT VISIT, EST, LEVL V, 40-54 MIN: ICD-10-PCS | Mod: S$GLB,,, | Performed by: SPECIALIST

## 2021-11-29 PROCEDURE — 99215 OFFICE O/P EST HI 40 MIN: CPT | Mod: S$GLB,,, | Performed by: SPECIALIST

## 2021-11-29 RX ORDER — HYDROCHLOROTHIAZIDE 12.5 MG/1
12.5 TABLET ORAL
Qty: 90 TABLET | Refills: 3 | Status: SHIPPED | OUTPATIENT
Start: 2021-11-29 | End: 2022-07-05 | Stop reason: SDUPTHER

## 2021-11-29 RX ORDER — METOPROLOL TARTRATE 100 MG/1
50 TABLET ORAL DAILY
Qty: 180 TABLET | Refills: 3 | Status: SHIPPED | OUTPATIENT
Start: 2021-11-29 | End: 2022-06-01

## 2021-11-30 ENCOUNTER — CLINICAL SUPPORT (OUTPATIENT)
Dept: REHABILITATION | Facility: HOSPITAL | Age: 66
End: 2021-11-30
Payer: MEDICARE

## 2021-11-30 DIAGNOSIS — M54.2 CHRONIC CERVICAL PAIN: ICD-10-CM

## 2021-11-30 DIAGNOSIS — G89.29 CHRONIC CERVICAL PAIN: ICD-10-CM

## 2021-11-30 DIAGNOSIS — R68.89 ACTIVITY INTOLERANCE: ICD-10-CM

## 2021-11-30 PROCEDURE — 97140 MANUAL THERAPY 1/> REGIONS: CPT | Mod: KX,PN

## 2021-12-02 ENCOUNTER — OFFICE VISIT (OUTPATIENT)
Dept: SURGERY | Facility: CLINIC | Age: 66
End: 2021-12-02
Payer: MEDICARE

## 2021-12-02 VITALS
SYSTOLIC BLOOD PRESSURE: 126 MMHG | DIASTOLIC BLOOD PRESSURE: 64 MMHG | BODY MASS INDEX: 27.09 KG/M2 | WEIGHT: 172.63 LBS | HEIGHT: 67 IN | HEART RATE: 72 BPM

## 2021-12-02 DIAGNOSIS — K43.9 VENTRAL HERNIA WITHOUT OBSTRUCTION OR GANGRENE: ICD-10-CM

## 2021-12-02 PROCEDURE — 99999 PR PBB SHADOW E&M-EST. PATIENT-LVL III: CPT | Mod: PBBFAC,,, | Performed by: STUDENT IN AN ORGANIZED HEALTH CARE EDUCATION/TRAINING PROGRAM

## 2021-12-02 PROCEDURE — 99204 OFFICE O/P NEW MOD 45 MIN: CPT | Mod: S$GLB,,, | Performed by: STUDENT IN AN ORGANIZED HEALTH CARE EDUCATION/TRAINING PROGRAM

## 2021-12-02 PROCEDURE — 4010F ACE/ARB THERAPY RXD/TAKEN: CPT | Mod: CPTII,S$GLB,, | Performed by: STUDENT IN AN ORGANIZED HEALTH CARE EDUCATION/TRAINING PROGRAM

## 2021-12-02 PROCEDURE — 99999 PR PBB SHADOW E&M-EST. PATIENT-LVL III: ICD-10-PCS | Mod: PBBFAC,,, | Performed by: STUDENT IN AN ORGANIZED HEALTH CARE EDUCATION/TRAINING PROGRAM

## 2021-12-02 PROCEDURE — 4010F PR ACE/ARB THEARPY RXD/TAKEN: ICD-10-PCS | Mod: CPTII,S$GLB,, | Performed by: STUDENT IN AN ORGANIZED HEALTH CARE EDUCATION/TRAINING PROGRAM

## 2021-12-02 PROCEDURE — 99204 PR OFFICE/OUTPT VISIT, NEW, LEVL IV, 45-59 MIN: ICD-10-PCS | Mod: S$GLB,,, | Performed by: STUDENT IN AN ORGANIZED HEALTH CARE EDUCATION/TRAINING PROGRAM

## 2021-12-08 ENCOUNTER — HOSPITAL ENCOUNTER (OUTPATIENT)
Facility: HOSPITAL | Age: 66
Discharge: HOME | End: 2021-12-08
Attending: ANESTHESIOLOGY | Admitting: ANESTHESIOLOGY
Payer: MEDICARE

## 2021-12-08 VITALS
SYSTOLIC BLOOD PRESSURE: 146 MMHG | HEART RATE: 64 BPM | WEIGHT: 170 LBS | HEIGHT: 67 IN | RESPIRATION RATE: 16 BRPM | DIASTOLIC BLOOD PRESSURE: 71 MMHG | OXYGEN SATURATION: 97 % | TEMPERATURE: 98 F | BODY MASS INDEX: 26.68 KG/M2

## 2021-12-08 DIAGNOSIS — M50.30 DEGENERATIVE DISC DISEASE, CERVICAL: Primary | ICD-10-CM

## 2021-12-08 PROCEDURE — 62321 NJX INTERLAMINAR CRV/THRC: CPT | Performed by: ANESTHESIOLOGY

## 2021-12-08 PROCEDURE — 25500020 PHARM REV CODE 255: Performed by: ANESTHESIOLOGY

## 2021-12-08 PROCEDURE — 99152 MOD SED SAME PHYS/QHP 5/>YRS: CPT | Performed by: ANESTHESIOLOGY

## 2021-12-08 PROCEDURE — 62321 PR INJ CERV/THORAC, W/GUIDANCE: ICD-10-PCS | Mod: ,,, | Performed by: ANESTHESIOLOGY

## 2021-12-08 PROCEDURE — 63600175 PHARM REV CODE 636 W HCPCS: Performed by: ANESTHESIOLOGY

## 2021-12-08 PROCEDURE — 62321 NJX INTERLAMINAR CRV/THRC: CPT | Mod: ,,, | Performed by: ANESTHESIOLOGY

## 2021-12-08 PROCEDURE — 25000003 PHARM REV CODE 250: Performed by: ANESTHESIOLOGY

## 2021-12-08 RX ORDER — ONDANSETRON 2 MG/ML
INJECTION INTRAMUSCULAR; INTRAVENOUS
Status: DISCONTINUED | OUTPATIENT
Start: 2021-12-08 | End: 2021-12-08 | Stop reason: HOSPADM

## 2021-12-08 RX ORDER — ONDANSETRON 2 MG/ML
INJECTION INTRAMUSCULAR; INTRAVENOUS
Status: DISCONTINUED
Start: 2021-12-08 | End: 2021-12-08 | Stop reason: HOSPADM

## 2021-12-08 RX ORDER — FENTANYL CITRATE 50 UG/ML
INJECTION, SOLUTION INTRAMUSCULAR; INTRAVENOUS
Status: DISCONTINUED | OUTPATIENT
Start: 2021-12-08 | End: 2021-12-08 | Stop reason: HOSPADM

## 2021-12-08 RX ORDER — MIDAZOLAM HYDROCHLORIDE 1 MG/ML
INJECTION INTRAMUSCULAR; INTRAVENOUS
Status: DISCONTINUED | OUTPATIENT
Start: 2021-12-08 | End: 2021-12-08 | Stop reason: HOSPADM

## 2021-12-08 RX ORDER — LIDOCAINE HYDROCHLORIDE 10 MG/ML
INJECTION INFILTRATION; PERINEURAL
Status: DISCONTINUED | OUTPATIENT
Start: 2021-12-08 | End: 2021-12-08 | Stop reason: HOSPADM

## 2021-12-08 RX ORDER — METHYLPREDNISOLONE ACETATE 80 MG/ML
INJECTION, SUSPENSION INTRA-ARTICULAR; INTRALESIONAL; INTRAMUSCULAR; SOFT TISSUE
Status: DISCONTINUED | OUTPATIENT
Start: 2021-12-08 | End: 2021-12-08 | Stop reason: HOSPADM

## 2021-12-08 RX ORDER — SODIUM CHLORIDE, SODIUM LACTATE, POTASSIUM CHLORIDE, CALCIUM CHLORIDE 600; 310; 30; 20 MG/100ML; MG/100ML; MG/100ML; MG/100ML
INJECTION, SOLUTION INTRAVENOUS CONTINUOUS
Status: DISCONTINUED | OUTPATIENT
Start: 2021-12-08 | End: 2021-12-08 | Stop reason: HOSPADM

## 2021-12-08 RX ADMIN — SODIUM CHLORIDE, SODIUM LACTATE, POTASSIUM CHLORIDE, AND CALCIUM CHLORIDE: .6; .31; .03; .02 INJECTION, SOLUTION INTRAVENOUS at 07:12

## 2021-12-09 ENCOUNTER — TELEPHONE (OUTPATIENT)
Dept: CARDIOLOGY | Facility: CLINIC | Age: 66
End: 2021-12-09
Payer: MEDICARE

## 2021-12-13 ENCOUNTER — TELEPHONE (OUTPATIENT)
Dept: PAIN MEDICINE | Facility: CLINIC | Age: 66
End: 2021-12-13
Payer: MEDICARE

## 2021-12-13 DIAGNOSIS — M47.892 OTHER SPONDYLOSIS, CERVICAL REGION: ICD-10-CM

## 2021-12-13 DIAGNOSIS — M54.12 CERVICAL RADICULOPATHY: Primary | ICD-10-CM

## 2021-12-13 DIAGNOSIS — M50.30 DDD (DEGENERATIVE DISC DISEASE), CERVICAL: ICD-10-CM

## 2021-12-13 RX ORDER — BUTALBITAL, ACETAMINOPHEN AND CAFFEINE 50; 325; 40 MG/1; MG/1; MG/1
1 TABLET ORAL EVERY 6 HOURS PRN
Qty: 30 TABLET | Refills: 0 | Status: SHIPPED | OUTPATIENT
Start: 2021-12-13 | End: 2022-01-12

## 2021-12-14 ENCOUNTER — TELEPHONE (OUTPATIENT)
Dept: PAIN MEDICINE | Facility: CLINIC | Age: 66
End: 2021-12-14
Payer: MEDICARE

## 2021-12-15 ENCOUNTER — CLINICAL SUPPORT (OUTPATIENT)
Dept: REHABILITATION | Facility: HOSPITAL | Age: 66
End: 2021-12-15
Payer: MEDICARE

## 2021-12-15 ENCOUNTER — TELEPHONE (OUTPATIENT)
Dept: FAMILY MEDICINE | Facility: CLINIC | Age: 66
End: 2021-12-15
Payer: MEDICARE

## 2021-12-15 DIAGNOSIS — E01.0 THYROMEGALY: Primary | ICD-10-CM

## 2021-12-15 DIAGNOSIS — R22.1 NECK SWELLING: ICD-10-CM

## 2021-12-15 DIAGNOSIS — G89.29 CHRONIC CERVICAL PAIN: ICD-10-CM

## 2021-12-15 DIAGNOSIS — R68.89 ACTIVITY INTOLERANCE: ICD-10-CM

## 2021-12-15 DIAGNOSIS — M54.2 CHRONIC CERVICAL PAIN: ICD-10-CM

## 2021-12-15 PROCEDURE — 97140 MANUAL THERAPY 1/> REGIONS: CPT | Mod: KX,PN

## 2021-12-16 ENCOUNTER — TELEPHONE (OUTPATIENT)
Dept: PAIN MEDICINE | Facility: CLINIC | Age: 66
End: 2021-12-16
Payer: MEDICARE

## 2021-12-17 ENCOUNTER — NURSE TRIAGE (OUTPATIENT)
Dept: ADMINISTRATIVE | Facility: CLINIC | Age: 66
End: 2021-12-17
Payer: MEDICARE

## 2021-12-17 ENCOUNTER — LAB VISIT (OUTPATIENT)
Dept: LAB | Facility: HOSPITAL | Age: 66
End: 2021-12-17
Attending: SPECIALIST
Payer: MEDICARE

## 2021-12-17 ENCOUNTER — CLINICAL SUPPORT (OUTPATIENT)
Dept: REHABILITATION | Facility: HOSPITAL | Age: 66
End: 2021-12-17
Payer: MEDICARE

## 2021-12-17 DIAGNOSIS — R68.89 ACTIVITY INTOLERANCE: ICD-10-CM

## 2021-12-17 DIAGNOSIS — G89.29 CHRONIC CERVICAL PAIN: ICD-10-CM

## 2021-12-17 DIAGNOSIS — M54.2 CHRONIC CERVICAL PAIN: ICD-10-CM

## 2021-12-17 DIAGNOSIS — E03.9 HYPOTHYROIDISM, UNSPECIFIED TYPE: ICD-10-CM

## 2021-12-17 LAB
T4 FREE SERPL-MCNC: 0.95 NG/DL (ref 0.71–1.51)
TSH SERPL DL<=0.005 MIU/L-ACNC: 1.97 UIU/ML (ref 0.4–4)

## 2021-12-17 PROCEDURE — 36415 COLL VENOUS BLD VENIPUNCTURE: CPT | Performed by: SPECIALIST

## 2021-12-17 PROCEDURE — 97140 MANUAL THERAPY 1/> REGIONS: CPT | Mod: KX,PN

## 2021-12-17 PROCEDURE — 84443 ASSAY THYROID STIM HORMONE: CPT | Performed by: SPECIALIST

## 2021-12-17 PROCEDURE — 84439 ASSAY OF FREE THYROXINE: CPT | Performed by: SPECIALIST

## 2021-12-19 ENCOUNTER — HOSPITAL ENCOUNTER (EMERGENCY)
Facility: HOSPITAL | Age: 66
Discharge: HOME | End: 2021-12-19
Attending: EMERGENCY MEDICINE
Payer: MEDICARE

## 2021-12-19 VITALS
OXYGEN SATURATION: 100 % | DIASTOLIC BLOOD PRESSURE: 79 MMHG | HEIGHT: 67 IN | SYSTOLIC BLOOD PRESSURE: 178 MMHG | TEMPERATURE: 98 F | HEART RATE: 58 BPM | WEIGHT: 170 LBS | BODY MASS INDEX: 26.68 KG/M2 | RESPIRATION RATE: 16 BRPM

## 2021-12-19 DIAGNOSIS — R51.9 NONINTRACTABLE HEADACHE, UNSPECIFIED CHRONICITY PATTERN, UNSPECIFIED HEADACHE TYPE: Primary | ICD-10-CM

## 2021-12-19 PROBLEM — E78.5 DYSLIPIDEMIA: Chronic | Status: ACTIVE | Noted: 2019-06-06

## 2021-12-19 PROBLEM — K86.89 PANCREATIC INSUFFICIENCY: Chronic | Status: ACTIVE | Noted: 2021-01-04

## 2021-12-19 PROBLEM — I11.0 LVH (LEFT VENTRICULAR HYPERTROPHY) DUE TO HYPERTENSIVE DISEASE, WITH HEART FAILURE: Chronic | Status: ACTIVE | Noted: 2019-06-06

## 2021-12-19 PROBLEM — Z95.5 S/P DRUG ELUTING CORONARY STENT PLACEMENT: Chronic | Status: ACTIVE | Noted: 2019-06-06

## 2021-12-19 LAB
ALBUMIN SERPL BCP-MCNC: 3.5 G/DL (ref 3.5–5.2)
ALP SERPL-CCNC: 105 U/L (ref 55–135)
ALT SERPL W/O P-5'-P-CCNC: 12 U/L (ref 10–44)
ANION GAP SERPL CALC-SCNC: 12 MMOL/L (ref 8–16)
AST SERPL-CCNC: 10 U/L (ref 10–40)
BASOPHILS # BLD AUTO: 0.08 K/UL (ref 0–0.2)
BASOPHILS NFR BLD: 0.8 % (ref 0–1.9)
BILIRUB SERPL-MCNC: 0.2 MG/DL (ref 0.1–1)
BILIRUB UR QL STRIP: NEGATIVE
BUN SERPL-MCNC: 15 MG/DL (ref 8–23)
CALCIUM SERPL-MCNC: 10.1 MG/DL (ref 8.7–10.5)
CHLORIDE SERPL-SCNC: 104 MMOL/L (ref 95–110)
CLARITY UR REFRACT.AUTO: CLEAR
CO2 SERPL-SCNC: 25 MMOL/L (ref 23–29)
COLOR UR AUTO: YELLOW
CREAT SERPL-MCNC: 0.8 MG/DL (ref 0.5–1.4)
CRP SERPL-MCNC: 2.6 MG/L (ref 0–8.2)
CTP QC/QA: YES
DIFFERENTIAL METHOD: ABNORMAL
EOSINOPHIL # BLD AUTO: 0.2 K/UL (ref 0–0.5)
EOSINOPHIL NFR BLD: 1.5 % (ref 0–8)
ERYTHROCYTE [DISTWIDTH] IN BLOOD BY AUTOMATED COUNT: 16.5 % (ref 11.5–14.5)
ERYTHROCYTE [SEDIMENTATION RATE] IN BLOOD BY WESTERGREN METHOD: 18 MM/HR (ref 0–36)
EST. GFR  (AFRICAN AMERICAN): >60 ML/MIN/1.73 M^2
EST. GFR  (NON AFRICAN AMERICAN): >60 ML/MIN/1.73 M^2
GLUCOSE SERPL-MCNC: 88 MG/DL (ref 70–110)
GLUCOSE UR QL STRIP: NEGATIVE
HCT VFR BLD AUTO: 35.7 % (ref 37–48.5)
HGB BLD-MCNC: 11 G/DL (ref 12–16)
HGB UR QL STRIP: NEGATIVE
IMM GRANULOCYTES # BLD AUTO: 0.09 K/UL (ref 0–0.04)
IMM GRANULOCYTES NFR BLD AUTO: 0.9 % (ref 0–0.5)
KETONES UR QL STRIP: NEGATIVE
LEUKOCYTE ESTERASE UR QL STRIP: NEGATIVE
LYMPHOCYTES # BLD AUTO: 2.9 K/UL (ref 1–4.8)
LYMPHOCYTES NFR BLD: 28.3 % (ref 18–48)
MCH RBC QN AUTO: 30.2 PG (ref 27–31)
MCHC RBC AUTO-ENTMCNC: 30.8 G/DL (ref 32–36)
MCV RBC AUTO: 98 FL (ref 82–98)
MONOCYTES # BLD AUTO: 0.8 K/UL (ref 0.3–1)
MONOCYTES NFR BLD: 8 % (ref 4–15)
NEUTROPHILS # BLD AUTO: 6.1 K/UL (ref 1.8–7.7)
NEUTROPHILS NFR BLD: 60.5 % (ref 38–73)
NITRITE UR QL STRIP: NEGATIVE
NRBC BLD-RTO: 0 /100 WBC
PH UR STRIP: 5 [PH] (ref 5–8)
PLATELET # BLD AUTO: 225 K/UL (ref 150–450)
PMV BLD AUTO: 9.3 FL (ref 9.2–12.9)
POTASSIUM SERPL-SCNC: 4.3 MMOL/L (ref 3.5–5.1)
PROT SERPL-MCNC: 6.6 G/DL (ref 6–8.4)
PROT UR QL STRIP: NEGATIVE
RBC # BLD AUTO: 3.64 M/UL (ref 4–5.4)
SARS-COV-2 RDRP RESP QL NAA+PROBE: NEGATIVE
SODIUM SERPL-SCNC: 141 MMOL/L (ref 136–145)
SP GR UR STRIP: 1.02 (ref 1–1.03)
TSH SERPL DL<=0.005 MIU/L-ACNC: 2.93 UIU/ML (ref 0.4–4)
URN SPEC COLLECT METH UR: NORMAL
WBC # BLD AUTO: 10.07 K/UL (ref 3.9–12.7)

## 2021-12-19 PROCEDURE — 81003 URINALYSIS AUTO W/O SCOPE: CPT | Performed by: INTERNAL MEDICINE

## 2021-12-19 PROCEDURE — 63600175 PHARM REV CODE 636 W HCPCS: Performed by: INTERNAL MEDICINE

## 2021-12-19 PROCEDURE — U0002 COVID-19 LAB TEST NON-CDC: HCPCS | Performed by: INTERNAL MEDICINE

## 2021-12-19 PROCEDURE — 84443 ASSAY THYROID STIM HORMONE: CPT | Performed by: INTERNAL MEDICINE

## 2021-12-19 PROCEDURE — 99285 EMERGENCY DEPT VISIT HI MDM: CPT | Mod: 25

## 2021-12-19 PROCEDURE — 96374 THER/PROPH/DIAG INJ IV PUSH: CPT | Mod: 59

## 2021-12-19 PROCEDURE — 96375 TX/PRO/DX INJ NEW DRUG ADDON: CPT | Mod: 59

## 2021-12-19 PROCEDURE — 86140 C-REACTIVE PROTEIN: CPT | Performed by: INTERNAL MEDICINE

## 2021-12-19 PROCEDURE — 25500020 PHARM REV CODE 255: Performed by: EMERGENCY MEDICINE

## 2021-12-19 PROCEDURE — 99284 PR EMERGENCY DEPT VISIT,LEVEL IV: ICD-10-PCS | Mod: GC,CS,, | Performed by: EMERGENCY MEDICINE

## 2021-12-19 PROCEDURE — 80053 COMPREHEN METABOLIC PANEL: CPT | Performed by: INTERNAL MEDICINE

## 2021-12-19 PROCEDURE — 85025 COMPLETE CBC W/AUTO DIFF WBC: CPT | Performed by: INTERNAL MEDICINE

## 2021-12-19 PROCEDURE — 85652 RBC SED RATE AUTOMATED: CPT | Performed by: INTERNAL MEDICINE

## 2021-12-19 PROCEDURE — 99284 EMERGENCY DEPT VISIT MOD MDM: CPT | Mod: GC,CS,, | Performed by: EMERGENCY MEDICINE

## 2021-12-19 PROCEDURE — 96361 HYDRATE IV INFUSION ADD-ON: CPT

## 2021-12-19 PROCEDURE — 25000003 PHARM REV CODE 250: Performed by: INTERNAL MEDICINE

## 2021-12-19 RX ORDER — PROCHLORPERAZINE MALEATE 5 MG
10 TABLET ORAL
Status: CANCELLED | OUTPATIENT
Start: 2021-12-19 | End: 2021-12-19

## 2021-12-19 RX ORDER — TETRACAINE HYDROCHLORIDE 5 MG/ML
2 SOLUTION OPHTHALMIC
Status: COMPLETED | OUTPATIENT
Start: 2021-12-19 | End: 2021-12-19

## 2021-12-19 RX ORDER — METOCLOPRAMIDE HYDROCHLORIDE 5 MG/ML
10 INJECTION INTRAMUSCULAR; INTRAVENOUS
Status: COMPLETED | OUTPATIENT
Start: 2021-12-19 | End: 2021-12-19

## 2021-12-19 RX ORDER — DIPHENHYDRAMINE HYDROCHLORIDE 50 MG/ML
25 INJECTION INTRAMUSCULAR; INTRAVENOUS
Status: COMPLETED | OUTPATIENT
Start: 2021-12-19 | End: 2021-12-19

## 2021-12-19 RX ORDER — SUMATRIPTAN 50 MG/1
50 TABLET, FILM COATED ORAL
Qty: 20 TABLET | Refills: 1 | Status: SHIPPED | OUTPATIENT
Start: 2021-12-19 | End: 2022-03-17

## 2021-12-19 RX ORDER — ACETAMINOPHEN 325 MG/1
650 TABLET ORAL
Status: COMPLETED | OUTPATIENT
Start: 2021-12-19 | End: 2021-12-19

## 2021-12-19 RX ADMIN — METOCLOPRAMIDE 10 MG: 5 INJECTION, SOLUTION INTRAMUSCULAR; INTRAVENOUS at 02:12

## 2021-12-19 RX ADMIN — ACETAMINOPHEN 650 MG: 325 TABLET ORAL at 02:12

## 2021-12-19 RX ADMIN — TETRACAINE HYDROCHLORIDE 2 DROP: 5 SOLUTION OPHTHALMIC at 02:12

## 2021-12-19 RX ADMIN — SODIUM CHLORIDE 500 ML: 0.9 INJECTION, SOLUTION INTRAVENOUS at 02:12

## 2021-12-19 RX ADMIN — IOHEXOL 75 ML: 350 INJECTION, SOLUTION INTRAVENOUS at 04:12

## 2021-12-19 RX ADMIN — DIPHENHYDRAMINE HYDROCHLORIDE 25 MG: 50 INJECTION INTRAMUSCULAR; INTRAVENOUS at 02:12

## 2021-12-20 ENCOUNTER — TELEPHONE (OUTPATIENT)
Dept: FAMILY MEDICINE | Facility: CLINIC | Age: 66
End: 2021-12-20
Payer: MEDICARE

## 2021-12-20 ENCOUNTER — TELEPHONE (OUTPATIENT)
Dept: PAIN MEDICINE | Facility: CLINIC | Age: 66
End: 2021-12-20
Payer: MEDICARE

## 2021-12-20 DIAGNOSIS — R11.0 NAUSEA: ICD-10-CM

## 2021-12-21 RX ORDER — ONDANSETRON 4 MG/1
8 TABLET, FILM COATED ORAL EVERY 8 HOURS PRN
Qty: 100 TABLET | Refills: 0 | Status: SHIPPED | OUTPATIENT
Start: 2021-12-21 | End: 2022-05-11 | Stop reason: ALTCHOICE

## 2021-12-22 ENCOUNTER — TELEPHONE (OUTPATIENT)
Dept: FAMILY MEDICINE | Facility: CLINIC | Age: 66
End: 2021-12-22
Payer: MEDICARE

## 2021-12-23 ENCOUNTER — OFFICE VISIT (OUTPATIENT)
Dept: FAMILY MEDICINE | Facility: CLINIC | Age: 66
End: 2021-12-23
Payer: MEDICARE

## 2021-12-23 ENCOUNTER — DOCUMENTATION ONLY (OUTPATIENT)
Dept: FAMILY MEDICINE | Facility: CLINIC | Age: 66
End: 2021-12-23
Payer: MEDICARE

## 2021-12-23 VITALS
WEIGHT: 176.38 LBS | BODY MASS INDEX: 27.68 KG/M2 | HEART RATE: 58 BPM | OXYGEN SATURATION: 97 % | DIASTOLIC BLOOD PRESSURE: 67 MMHG | SYSTOLIC BLOOD PRESSURE: 123 MMHG | RESPIRATION RATE: 18 BRPM | HEIGHT: 67 IN

## 2021-12-23 DIAGNOSIS — E78.5 DYSLIPIDEMIA: Chronic | ICD-10-CM

## 2021-12-23 DIAGNOSIS — M79.18 MYOFASCIAL PAIN: ICD-10-CM

## 2021-12-23 DIAGNOSIS — Z12.2 ENCOUNTER FOR SCREENING FOR LUNG CANCER: ICD-10-CM

## 2021-12-23 DIAGNOSIS — F17.210 NICOTINE DEPENDENCE, CIGARETTES, UNCOMPLICATED: ICD-10-CM

## 2021-12-23 DIAGNOSIS — M62.838 MUSCLE SPASMS OF BOTH LOWER EXTREMITIES: ICD-10-CM

## 2021-12-23 DIAGNOSIS — Z78.0 ASYMPTOMATIC MENOPAUSAL STATE: Primary | ICD-10-CM

## 2021-12-23 DIAGNOSIS — G43.719 INTRACTABLE CHRONIC MIGRAINE WITHOUT AURA AND WITHOUT STATUS MIGRAINOSUS: ICD-10-CM

## 2021-12-23 PROCEDURE — 3008F PR BODY MASS INDEX (BMI) DOCUMENTED: ICD-10-PCS | Mod: CPTII,S$GLB,, | Performed by: FAMILY MEDICINE

## 2021-12-23 PROCEDURE — 3288F PR FALLS RISK ASSESSMENT DOCUMENTED: ICD-10-PCS | Mod: CPTII,S$GLB,, | Performed by: FAMILY MEDICINE

## 2021-12-23 PROCEDURE — 1159F PR MEDICATION LIST DOCUMENTED IN MEDICAL RECORD: ICD-10-PCS | Mod: CPTII,S$GLB,, | Performed by: FAMILY MEDICINE

## 2021-12-23 PROCEDURE — 99214 OFFICE O/P EST MOD 30 MIN: CPT | Mod: S$GLB,,, | Performed by: FAMILY MEDICINE

## 2021-12-23 PROCEDURE — 3008F BODY MASS INDEX DOCD: CPT | Mod: CPTII,S$GLB,, | Performed by: FAMILY MEDICINE

## 2021-12-23 PROCEDURE — 3074F SYST BP LT 130 MM HG: CPT | Mod: CPTII,S$GLB,, | Performed by: FAMILY MEDICINE

## 2021-12-23 PROCEDURE — 3078F PR MOST RECENT DIASTOLIC BLOOD PRESSURE < 80 MM HG: ICD-10-PCS | Mod: CPTII,S$GLB,, | Performed by: FAMILY MEDICINE

## 2021-12-23 PROCEDURE — 99999 PR PBB SHADOW E&M-EST. PATIENT-LVL V: ICD-10-PCS | Mod: PBBFAC,,, | Performed by: FAMILY MEDICINE

## 2021-12-23 PROCEDURE — 1160F RVW MEDS BY RX/DR IN RCRD: CPT | Mod: CPTII,S$GLB,, | Performed by: FAMILY MEDICINE

## 2021-12-23 PROCEDURE — 99214 PR OFFICE/OUTPT VISIT, EST, LEVL IV, 30-39 MIN: ICD-10-PCS | Mod: S$GLB,,, | Performed by: FAMILY MEDICINE

## 2021-12-23 PROCEDURE — 3078F DIAST BP <80 MM HG: CPT | Mod: CPTII,S$GLB,, | Performed by: FAMILY MEDICINE

## 2021-12-23 PROCEDURE — 1159F MED LIST DOCD IN RCRD: CPT | Mod: CPTII,S$GLB,, | Performed by: FAMILY MEDICINE

## 2021-12-23 PROCEDURE — 1101F PT FALLS ASSESS-DOCD LE1/YR: CPT | Mod: CPTII,S$GLB,, | Performed by: FAMILY MEDICINE

## 2021-12-23 PROCEDURE — 99999 PR PBB SHADOW E&M-EST. PATIENT-LVL V: CPT | Mod: PBBFAC,,, | Performed by: FAMILY MEDICINE

## 2021-12-23 PROCEDURE — 4010F ACE/ARB THERAPY RXD/TAKEN: CPT | Mod: CPTII,S$GLB,, | Performed by: FAMILY MEDICINE

## 2021-12-23 PROCEDURE — 1160F PR REVIEW ALL MEDS BY PRESCRIBER/CLIN PHARMACIST DOCUMENTED: ICD-10-PCS | Mod: CPTII,S$GLB,, | Performed by: FAMILY MEDICINE

## 2021-12-23 PROCEDURE — 1101F PR PT FALLS ASSESS DOC 0-1 FALLS W/OUT INJ PAST YR: ICD-10-PCS | Mod: CPTII,S$GLB,, | Performed by: FAMILY MEDICINE

## 2021-12-23 PROCEDURE — 3288F FALL RISK ASSESSMENT DOCD: CPT | Mod: CPTII,S$GLB,, | Performed by: FAMILY MEDICINE

## 2021-12-23 PROCEDURE — 3074F PR MOST RECENT SYSTOLIC BLOOD PRESSURE < 130 MM HG: ICD-10-PCS | Mod: CPTII,S$GLB,, | Performed by: FAMILY MEDICINE

## 2021-12-23 PROCEDURE — 4010F PR ACE/ARB THEARPY RXD/TAKEN: ICD-10-PCS | Mod: CPTII,S$GLB,, | Performed by: FAMILY MEDICINE

## 2021-12-23 RX ORDER — BACLOFEN 10 MG/1
10 TABLET ORAL 3 TIMES DAILY PRN
Qty: 270 TABLET | Refills: 3 | Status: SHIPPED | OUTPATIENT
Start: 2021-12-23 | End: 2022-03-24

## 2021-12-24 ENCOUNTER — PATIENT MESSAGE (OUTPATIENT)
Dept: PAIN MEDICINE | Facility: CLINIC | Age: 66
End: 2021-12-24
Payer: MEDICARE

## 2021-12-27 ENCOUNTER — HOSPITAL ENCOUNTER (OUTPATIENT)
Dept: RADIOLOGY | Facility: HOSPITAL | Age: 66
Discharge: HOME | End: 2021-12-27
Attending: SPECIALIST
Payer: MEDICARE

## 2021-12-27 ENCOUNTER — HOSPITAL ENCOUNTER (OUTPATIENT)
Dept: RADIOLOGY | Facility: HOSPITAL | Age: 66
Discharge: HOME | End: 2021-12-27
Attending: FAMILY MEDICINE
Payer: MEDICARE

## 2021-12-27 DIAGNOSIS — G45.9 TIA (TRANSIENT ISCHEMIC ATTACK): ICD-10-CM

## 2021-12-27 DIAGNOSIS — R22.1 NECK SWELLING: ICD-10-CM

## 2021-12-27 DIAGNOSIS — F17.210 NICOTINE DEPENDENCE, CIGARETTES, UNCOMPLICATED: ICD-10-CM

## 2021-12-27 DIAGNOSIS — Z12.2 ENCOUNTER FOR SCREENING FOR LUNG CANCER: ICD-10-CM

## 2021-12-27 PROCEDURE — 93880 EXTRACRANIAL BILAT STUDY: CPT | Mod: 26,,, | Performed by: RADIOLOGY

## 2021-12-27 PROCEDURE — 76536 US SOFT TISSUE HEAD NECK THYROID: ICD-10-PCS | Mod: 26,,, | Performed by: RADIOLOGY

## 2021-12-27 PROCEDURE — 93880 US CAROTID BILATERAL: ICD-10-PCS | Mod: 26,,, | Performed by: RADIOLOGY

## 2021-12-27 PROCEDURE — 71271 CT THORAX LUNG CANCER SCR C-: CPT | Mod: TC

## 2021-12-27 PROCEDURE — 76536 US EXAM OF HEAD AND NECK: CPT | Mod: 26,,, | Performed by: RADIOLOGY

## 2021-12-27 PROCEDURE — 76536 US EXAM OF HEAD AND NECK: CPT | Mod: TC

## 2021-12-27 PROCEDURE — 71271 CT CHEST LUNG SCREENING LOW DOSE: ICD-10-PCS | Mod: 26,,, | Performed by: RADIOLOGY

## 2021-12-27 PROCEDURE — 93880 EXTRACRANIAL BILAT STUDY: CPT | Mod: TC

## 2021-12-27 PROCEDURE — 71271 CT THORAX LUNG CANCER SCR C-: CPT | Mod: 26,,, | Performed by: RADIOLOGY

## 2021-12-29 ENCOUNTER — TELEPHONE (OUTPATIENT)
Dept: FAMILY MEDICINE | Facility: CLINIC | Age: 66
End: 2021-12-29
Payer: MEDICARE

## 2021-12-29 NOTE — TELEPHONE ENCOUNTER
----- Message from Nirav Desouza sent at 12/29/2021  2:11 PM CST -----  Contact: Self  Type:  Patient Returning Call    Who Called:  Patient  Who Left Message for Patient:  Damari  Does the patient know what this is regarding?:  no  Best Call Back Number:  [phone]   Additional Information:   Would also like levothyroxine (SYNTHROID) 75 MCG tablet, EScitalopram oxalate (LEXAPRO) 20 MG tablet refilled to       [id number] [place] [address] AT [address] OF [facility] [address] [address]  Phone: [phone] Fax: [phone]

## 2021-12-30 ENCOUNTER — HOSPITAL ENCOUNTER (OUTPATIENT)
Dept: RADIOLOGY | Facility: HOSPITAL | Age: 66
Discharge: HOME | End: 2021-12-30
Attending: FAMILY MEDICINE
Payer: MEDICARE

## 2021-12-30 ENCOUNTER — CLINICAL SUPPORT (OUTPATIENT)
Dept: REHABILITATION | Facility: HOSPITAL | Age: 66
End: 2021-12-30
Payer: MEDICARE

## 2021-12-30 DIAGNOSIS — Z78.0 ASYMPTOMATIC MENOPAUSAL STATE: ICD-10-CM

## 2021-12-30 DIAGNOSIS — M54.2 CHRONIC CERVICAL PAIN: ICD-10-CM

## 2021-12-30 DIAGNOSIS — R68.89 ACTIVITY INTOLERANCE: ICD-10-CM

## 2021-12-30 DIAGNOSIS — G89.29 CHRONIC CERVICAL PAIN: ICD-10-CM

## 2021-12-30 PROCEDURE — 77080 DXA BONE DENSITY AXIAL: CPT | Mod: TC

## 2021-12-30 PROCEDURE — 77080 DEXA BONE DENSITY SPINE HIP: ICD-10-PCS | Mod: 26,,, | Performed by: RADIOLOGY

## 2021-12-30 PROCEDURE — 97140 MANUAL THERAPY 1/> REGIONS: CPT | Mod: KX,PN

## 2021-12-30 PROCEDURE — 77080 DXA BONE DENSITY AXIAL: CPT | Mod: 26,,, | Performed by: RADIOLOGY

## 2021-12-30 RX ORDER — ESCITALOPRAM OXALATE 20 MG/1
20 TABLET ORAL NIGHTLY
Qty: 90 TABLET | Refills: 3 | Status: SHIPPED | OUTPATIENT
Start: 2021-12-30 | End: 2022-03-14 | Stop reason: SDUPTHER

## 2021-12-30 RX ORDER — LEVOTHYROXINE SODIUM 75 UG/1
75 TABLET ORAL DAILY
Qty: 90 TABLET | Refills: 3 | Status: SHIPPED | OUTPATIENT
Start: 2021-12-30 | End: 2022-03-14 | Stop reason: SDUPTHER

## 2021-12-30 NOTE — PROGRESS NOTES
Physical Therapy Functional Dry Needling Note       Date:  12/30/2021    Name: Alessia Nelson  Clinic Number: [id number]    Therapy Diagnosis:   Encounter Diagnoses   Name Primary?    Chronic cervical pain     Activity intolerance      Physician: Sheree Metz MD      Start Time:  10:45  am   Stop Time:  12:00 pm   Total Billable Time:     minutes    Subjective     Pt reports:  Alessia stated she ended up going to the ER the day after her last visit with a horrible headache -  Doesn't think it was related to DN, but none the less, HA's have increased in frequency and intensity; Had CT of head and temporal bone (checking for mastoiditis) All were negative findings,  Alessia has started to see a new neurologist - Dr. Humphreys - recommended that she continue with her PT; also ordered a brain MRI.   Location: left side of neck, left side of face into forehead     Objective     Today, the patient continues to report of neck pain with radiation into her shoulders (L > R) and also is reporting a severe headache on the left side of her face into forehead.  The patient reports of radiation down her LUE. She reports of persistent numbness and tingling in the fingers of her left hand. The patient denies of any significant changes in her health since her last appointment. The patient also denies of any changes in the character of her pain since her last appointment. The patient reports that her current pain is a 7/10 - forehead and posterior cervical spine - left over right       See EMR under MEDIA for written consent provided : Dry Needling - 10/14/2021;     Palpation Assessment to determine the necessity for Functional Dry Needling  Tenderness/tightness/migraine headaches - bilateral posterior cervical musculature; left/right upper trap; left/right levator; middle scalene, posterior scalene on left , left SCM, left temporal area. . .     Alessia received the following manual therapy techniques:   Decision made to perform   Needling of SCM, Anterior and Middle Scalenes to address her chronic torticollis symptoms; Needling of posterior cervical/upper trap area, and trial of CGH - facial needling have not provided much relief for her.     Manual : supine : suboccipital inhibition; aggressive stretching of cervical mm - forward flexion of neck to end range with hold; extension off end of table with end range hold; rotation with flexion/SB to each side with contract relax (MET) to facilitate inhibition of tension;   Dry Needling:   Dry Needling  for 20  minutes.   Patient supine with knee bolster; LF-ES per protocol; 2-point SCM protocol and 1 point into anterior scalene. 1 point middle scalene;  - 1TB above clavicle through SCM clavicular head; left TMJ area x 3 needles, corner of left eye, 2 points on inferior mandible; Needles left in-situ x 20 mins;   Call bell placed in reach of patient .   Moist heat to neck after removal of needles x 10 mins       Home Exercises and Patient Education Provided     Education provided:   - continue with HEP; monitor symptoms follow DN     Written Handout on response to FDN provided: yes    Alessia demonstrated good  understanding of the education provided.   .     Assessment     Patient demonstrated appropriate response to Functional Dry Needling. Continue to try new points to needle as pain has not really changed;  Patient does report improvement in her pain with the DN, feels this is more helpful than just stretching;  Nothing but temporary pain relief at this point. . Discussed postural changes noted with Alessia; becoming more rounded at shoulders/tspine - educated in scapular retraction, neck retraction. Alessia is to get updated orders from Dr Sin's office for continued PT.     Plan     Continue with Physical Therapy 1-2x/week

## 2022-01-03 ENCOUNTER — TELEPHONE (OUTPATIENT)
Dept: FAMILY MEDICINE | Facility: CLINIC | Age: 67
End: 2022-01-03
Payer: MEDICARE

## 2022-01-03 ENCOUNTER — TELEPHONE (OUTPATIENT)
Dept: ENDOSCOPY | Facility: HOSPITAL | Age: 67
End: 2022-01-03
Payer: MEDICARE

## 2022-01-03 NOTE — TELEPHONE ENCOUNTER
Attempted to contact Alessia Nelson to discuss results.    Left voice mail to return our call at [phone] on [phone] (home).    Damari Garcia LPN

## 2022-01-04 ENCOUNTER — HOSPITAL ENCOUNTER (OUTPATIENT)
Dept: RADIOLOGY | Facility: HOSPITAL | Age: 67
Discharge: HOME | End: 2022-01-04
Attending: STUDENT IN AN ORGANIZED HEALTH CARE EDUCATION/TRAINING PROGRAM
Payer: MEDICARE

## 2022-01-04 DIAGNOSIS — G43.009 ATYPICAL MIGRAINE: ICD-10-CM

## 2022-01-05 ENCOUNTER — CLINICAL SUPPORT (OUTPATIENT)
Dept: REHABILITATION | Facility: HOSPITAL | Age: 67
End: 2022-01-05
Payer: MEDICARE

## 2022-01-05 DIAGNOSIS — R68.89 ACTIVITY INTOLERANCE: ICD-10-CM

## 2022-01-05 DIAGNOSIS — M54.2 CHRONIC CERVICAL PAIN: ICD-10-CM

## 2022-01-05 DIAGNOSIS — G89.29 CHRONIC CERVICAL PAIN: ICD-10-CM

## 2022-01-05 PROCEDURE — 97140 MANUAL THERAPY 1/> REGIONS: CPT | Mod: PN

## 2022-01-05 NOTE — PROGRESS NOTES
Physical Therapy Functional Dry Needling Note       Date:  1/5/2022    Name: Alessia Nelson  Clinic Number: [id number]    Therapy Diagnosis:   Encounter Diagnoses   Name Primary?    Chronic cervical pain     Activity intolerance      Physician: Sheree Metz MD      Start Time:  1:45 pm    Stop Time:  3:00 pm   Total Billable Time:  45   minutes    Subjective     Pt reports:  Alessia reports great success with last Dry Needling Session - she had almost immediate relief of the headache she had on the left side of her neck into head; After about 1 week, she just has some mild return of pain around her left eye and SCM. Stated that she went and bought some paint today - this is how good she feels.   Location: left side of neck, left side of face into forehead   Pain:     Objective     Today, the patient continues to report of neck pain with radiation into her shoulders (L > R) and also is reporting a severe headache on the left side of her face into forehead.  The patient reports of radiation down her LUE. She reports of persistent numbness and tingling in the fingers of her left hand. The patient denies of any significant changes in her health since her last appointment. The patient also denies of any changes in the character of her pain since her last appointment. The patient reports that her current pain is a 7/10 - forehead and posterior cervical spine - left over right       See EMR under MEDIA for written consent provided : Dry Needling - 10/14/2021;     Palpation Assessment to determine the necessity for Functional Dry Needling  Tenderness/tightness/migraine headaches - bilateral posterior cervical musculature; left/right upper trap; left/right levator; middle scalene, posterior scalene on left , left SCM, left temporal area. . .     Alessia received the following manual therapy techniques:    Needling of SCM, Anterior and Middle Scalenes to address her chronic torticollis symptoms;  Needling of left TMJ,  Left orbital area to address pain.   Manual : supine : suboccipital inhibition; aggressive stretching of cervical mm - forward flexion of neck to end range with hold; extension off end of table with end range hold; rotation with flexion/SB to each side with contract relax (MET) to facilitate inhibition of tension;   Dry Needling:   Dry Needling  for 20  minutes.   Patient supine with knee bolster; LF-ES per protocol; 2-point SCM protocol and 1 point into anterior scalene. 1 point middle scalene;  - 1TB above clavicle through SCM clavicular head; left TMJ area x 3 needles, corner of left eye, 2 points on inferior mandible; Needles left in-situ x 20 mins;   Call bell placed in reach of patient .   Moist heat to neck after removal of needles x 10 mins       Home Exercises and Patient Education Provided     Education provided:   - continue with HEP; monitor symptoms follow DN     Written Handout on response to FDN provided: yes    Alessia demonstrated good  understanding of the education provided.   .     Assessment     Patient demonstrated great response to Dry Needling of left SCM, Scalene and TMJ/Left facial area as noted above;  She is working on exercises as discussed in previous notes to address her postural deficits. This is the first time in multiple weeks that therapy as been really effective for Alessia.      Plan     Continue with Physical Therapy 1-2x/week; Dry Needling, Manual Treatment, TherEx and TherActivities.

## 2022-01-07 ENCOUNTER — CLINICAL SUPPORT (OUTPATIENT)
Dept: REHABILITATION | Facility: HOSPITAL | Age: 67
End: 2022-01-07
Payer: MEDICARE

## 2022-01-07 DIAGNOSIS — M54.2 CHRONIC CERVICAL PAIN: Primary | ICD-10-CM

## 2022-01-07 DIAGNOSIS — G89.29 CHRONIC CERVICAL PAIN: Primary | ICD-10-CM

## 2022-01-07 DIAGNOSIS — R68.89 ACTIVITY INTOLERANCE: ICD-10-CM

## 2022-01-07 PROCEDURE — 97110 THERAPEUTIC EXERCISES: CPT | Mod: PN,CQ

## 2022-01-07 NOTE — PROGRESS NOTES
Physical Therapy Functional Dry Needling Note       Date:  1/7/2022    Name: Alessia Nelson  Clinic Number: [id number]    Therapy Diagnosis:   Encounter Diagnoses   Name Primary?    Chronic cervical pain Yes    Activity intolerance      Physician: Sheree Metz MD      Start Time:  9:00 am  Stop Time:  9:40 am  Total Billable Time:  40   minutes    Subjective     Pt reports:  Alessia reports great success with last Dry Needling Session - she had almost immediate relief of the headache she had on the left side of her neck into head.  Location: left side of neck, left side of face into forehead   Pain:     Objective     Today, the patient continues to report of neck pain with radiation into her shoulders (L > R) and also is reporting a light headache on the left side of her face into forehead.      There-ex 40 min   UBE mid resistance 4/4 min   Rowing blue t band x 20   Wall climb x 10 each arm   Pulley x 3 min   Lev. scap 5 x 10 sec each   pec minor 5 x 10 sec each   Posture re-ed       DNP today  See EMR under MEDIA for written consent provided : Dry Needling - 10/14/2021;     Palpation Assessment to determine the necessity for Functional Dry Needling  Tenderness/tightness/migraine headaches - bilateral posterior cervical musculature; left/right upper trap; left/right levator; middle scalene, posterior scalene on left , left SCM, left temporal area. . .   DNP today  Alessia received the following manual therapy techniques:    Needling of SCM, Anterior and Middle Scalenes to address her chronic torticollis symptoms;  Needling of left TMJ, Left orbital area to address pain.   Manual : supine : suboccipital inhibition; aggressive stretching of cervical mm - forward flexion of neck to end range with hold; extension off end of table with end range hold; rotation with flexion/SB to each side with contract relax (MET) to facilitate inhibition of tension;   Dry Needling:   Dry Needling  for 20  minutes.   Patient supine  with knee bolster; LF-ES per protocol; 2-point SCM protocol and 1 point into anterior scalene. 1 point middle scalene;  - 1TB above clavicle through SCM clavicular head; left TMJ area x 3 needles, corner of left eye, 2 points on inferior mandible; Needles left in-situ x 20 mins;   Call bell placed in reach of patient .   Moist heat to neck after removal of needles x 10 mins       Home Exercises and Patient Education Provided     Education provided:   - continue with HEP; monitor symptoms follow DN     Written Handout on response to FDN provided: yes    Alessia demonstrated good  understanding of the education provided.   .     Assessment     Patient performed exercises without increase in pain.      Plan     Continue with Physical Therapy 1-2x/week; Dry Needling, Manual Treatment, TherEx and TherActivities.

## 2022-01-11 ENCOUNTER — CLINICAL SUPPORT (OUTPATIENT)
Dept: REHABILITATION | Facility: HOSPITAL | Age: 67
End: 2022-01-11
Payer: MEDICARE

## 2022-01-11 ENCOUNTER — HOSPITAL ENCOUNTER (OUTPATIENT)
Dept: RADIOLOGY | Facility: HOSPITAL | Age: 67
Discharge: HOME | End: 2022-01-11
Attending: STUDENT IN AN ORGANIZED HEALTH CARE EDUCATION/TRAINING PROGRAM
Payer: MEDICARE

## 2022-01-11 DIAGNOSIS — R68.89 ACTIVITY INTOLERANCE: ICD-10-CM

## 2022-01-11 DIAGNOSIS — G89.29 CHRONIC CERVICAL PAIN: ICD-10-CM

## 2022-01-11 DIAGNOSIS — M54.2 CHRONIC CERVICAL PAIN: ICD-10-CM

## 2022-01-11 PROCEDURE — 70551 MRI BRAIN STEM W/O DYE: CPT | Mod: TC,PN

## 2022-01-11 PROCEDURE — 70551 MRI BRAIN STEM W/O DYE: CPT | Mod: 26,,, | Performed by: RADIOLOGY

## 2022-01-11 PROCEDURE — 70551 MRI BRAIN WITHOUT CONTRAST: ICD-10-PCS | Mod: 26,,, | Performed by: RADIOLOGY

## 2022-01-11 PROCEDURE — 97140 MANUAL THERAPY 1/> REGIONS: CPT | Mod: PN

## 2022-01-11 NOTE — PROGRESS NOTES
"Physical Therapy Functional Dry Needling Note       Date:  1/11/2022    Name: Alessia Nelson  Clinic Number: [id number]    Therapy Diagnosis:   Encounter Diagnoses   Name Primary?    Chronic cervical pain     Activity intolerance      Physician: Sheree Metz MD      Start Time:  12:15 pm   Stop Time:  1:30 pm   Total Billable Time:  40   minutes    Subjective     Pt reports:  Alessia  Is reporting a "crick" in her neck on the right side from sleeping on her side; Left side is better, but pain is still present   Location: left side of neck, left side of face into forehead   Pain: 5/10 on right side of neck;      Objective     Today, the patient continues to report of neck pain with radiation into her shoulders (L > R) and also is reporting a light headache on the left side of her face into forehead.      There-ex 40 min   UBE mid resistance 4/4 min   Rowing blue t band x 20   Wall climb x 10 each arm   Pulley x 3 min   Lev. scap 5 x 10 sec each   pec minor 5 x 10 sec each   Posture re-ed       DNP today  See EMR under MEDIA for written consent provided : Dry Needling - 10/14/2021;     Palpation Assessment to determine the necessity for Functional Dry Needling  Tenderness/tightness/migraine headaches - bilateral posterior cervical musculature; left/right upper trap; left/right levator; middle scalene, posterior scalene on left , left SCM, left temporal area. . .   DNP today  Alessia received the following manual therapy techniques:    Needling of SCM, Anterior and Middle Scalenes to address her chronic torticollis symptoms;  Needling of left TMJ, Left orbital area to address pain.   Manual : supine : suboccipital inhibition; aggressive stretching of cervical mm - forward flexion of neck to end range with hold; extension off end of table with end range hold; rotation with flexion/SB to each side with contract relax (MET) to facilitate inhibition of tension;   Dry Needling:   Dry Needling  for 20  minutes. "   Patient supine with knee bolster; LF-ES per protocol; 2-point SCM protocol and 1 point into anterior scalene. 1 point middle scalene;  - 1TB above clavicle through SCM clavicular head; left TMJ area x 3 needles, corner of left eye, 2 points on inferior mandible; Needles left in-situ x 20 mins;   Call bell placed in reach of patient .      Home Exercises and Patient Education Provided     Education provided:   - continue with HEP; monitor symptoms follow DN     Written Handout on response to FDN provided: yes    Alessia demonstrated good  understanding of the education provided.   .     Assessment   ,  Alessia is demonstrating good progress with the Dry Needling to the left SCM, left anterior scalene for muscle/fascia pain along with needling to left TMJ, and left eye orbit for headaches. Encouraged continued HEP to help with cervical/thoracic and shoulder posture and mm tightness.     Plan     Continue with Physical Therapy 1-2x/week; Dry Needling, Manual Treatment, TherEx and TherActivities.

## 2022-01-13 ENCOUNTER — CLINICAL SUPPORT (OUTPATIENT)
Dept: REHABILITATION | Facility: HOSPITAL | Age: 67
End: 2022-01-13
Payer: MEDICARE

## 2022-01-13 DIAGNOSIS — R68.89 ACTIVITY INTOLERANCE: ICD-10-CM

## 2022-01-13 DIAGNOSIS — M54.2 CHRONIC CERVICAL PAIN: ICD-10-CM

## 2022-01-13 DIAGNOSIS — G89.29 CHRONIC CERVICAL PAIN: ICD-10-CM

## 2022-01-13 PROCEDURE — 97140 MANUAL THERAPY 1/> REGIONS: CPT | Mod: PN

## 2022-01-13 NOTE — PROGRESS NOTES
Physical Therapy Functional Dry Needling Note       Date:  1/13/2022    Name: Alessia Nelson  Clinic Number: [id number]    Therapy Diagnosis:   Encounter Diagnoses   Name Primary?    Chronic cervical pain     Activity intolerance      Physician: Sheree Metz MD      Start Time:  1:00 pm   Stop Time:  1:35 pm   Total Billable Time:  35  minutes    Subjective     Pt reports:  Alessia sated that she just doesn't feel to hot today, nothing  particular, just doesn't have any pep. Used a weight to aid in stretching levator the other night - 6.5 lb;   Location:  Both sides of her neck; still has some headache over left eye.   Pain: 5/10 on right side of neck;      Objective     Today, the patient continues to report of neck pain with radiation into her shoulders (L > R) and also is reporting a light headache on the left side of her face into forehead.    Only perform UBE ex x 10 mins     DNP  There-ex 40 min   UBE mid resistance 4/4 min   Rowing blue t band x 20   Wall climb x 10 each arm   Pulley x 3 min   Lev. scap 5 x 10 sec each   pec minor 5 x 10 sec each   Posture re-ed          Alessia received the following manual therapy techniques:  X 25 mins   Manual : supine : suboccipital inhibition; aggressive stretching of cervical mm - forward flexion of neck to end range with hold; extension off end of table with end range hold; rotation with flexion/SB to each side with contract relax (MET) to facilitate inhibition of tension;     DNP  Palpation Assessment to determine the necessity for Functional Dry Needling  Tenderness/tightness/migraine headaches - bilateral posterior cervical musculature; left/right upper trap; left/right levator; middle scalene, posterior scalene on left , left SCM, left temporal area. . .     Dry Needling:   Dry Needling  for 20  minutes.   Patient supine with knee bolster; LF-ES per protocol; 2-point SCM protocol and 1 point into anterior scalene. 1 point middle scalene;  - 1TB above clavicle  "through SCM clavicular head; left TMJ area x 3 needles, corner of left eye, 2 points on inferior mandible; Needles left in-situ x 20 mins;   Call bell placed in reach of patient .      Home Exercises and Patient Education Provided     Education provided:   - continue with HEP; monitor symptoms follow DN     Written Handout on response to FDN provided: yes    Alessia demonstrated good  understanding of the education provided.   .     Assessment   ,  Alessia is having an "off" day today; tired, just didn't feel like having the Dry Needling performed;  Responded well to manual tx; we have been getting encouraging results with Dry Needling, so will resume next week if patient is feeling up to it.  Encouraged continued HEP to help with cervical/thoracic and shoulder posture and mm tightness.     Plan     Continue with Physical Therapy 1-2x/week; Dry Needling, Manual Treatment, TherEx and TherActivities.         "

## 2022-01-18 ENCOUNTER — PATIENT OUTREACH (OUTPATIENT)
Dept: ADMINISTRATIVE | Facility: OTHER | Age: 67
End: 2022-01-18
Payer: MEDICARE

## 2022-01-18 ENCOUNTER — TELEPHONE (OUTPATIENT)
Dept: ENDOSCOPY | Facility: HOSPITAL | Age: 67
End: 2022-01-18
Payer: MEDICARE

## 2022-01-20 ENCOUNTER — TELEPHONE (OUTPATIENT)
Dept: PAIN MEDICINE | Facility: CLINIC | Age: 67
End: 2022-01-20
Payer: MEDICARE

## 2022-01-20 NOTE — TELEPHONE ENCOUNTER
----- Message from Yasmin Ramírez sent at 1/20/2022 11:34 AM CST -----  Regarding: cancel appt not feeling well today thanks  Type: Needs Medical Advice  Who Called:    Symptoms (please be specific):    How long has patient had these symptoms:    Pharmacy name and phone #:    Best Call Back Number: [phone] (home)     Additional Information: cancel appt not feeling well today thanks

## 2022-01-26 ENCOUNTER — TELEPHONE (OUTPATIENT)
Dept: ENDOSCOPY | Facility: HOSPITAL | Age: 67
End: 2022-01-26
Payer: MEDICARE

## 2022-01-26 NOTE — TELEPHONE ENCOUNTER
----- Message from Rajinder Reyes sent at 1/26/2022  9:24 AM CST -----  Regarding: missed call from [facility]    The Pt would like a call back to sched the appt for her colonoscopy since she missed a call from [facility]     # [phone]

## 2022-01-28 ENCOUNTER — CLINICAL SUPPORT (OUTPATIENT)
Dept: REHABILITATION | Facility: HOSPITAL | Age: 67
End: 2022-01-28
Payer: MEDICARE

## 2022-01-28 DIAGNOSIS — R68.89 ACTIVITY INTOLERANCE: ICD-10-CM

## 2022-01-28 DIAGNOSIS — G89.29 CHRONIC CERVICAL PAIN: ICD-10-CM

## 2022-01-28 DIAGNOSIS — M54.2 CHRONIC CERVICAL PAIN: ICD-10-CM

## 2022-01-28 PROCEDURE — 97140 MANUAL THERAPY 1/> REGIONS: CPT | Mod: PN

## 2022-01-28 NOTE — PROGRESS NOTES
Physical Therapy Functional Dry Needling Note       Date:  1/28/2022    Name: Alessia Nelson  Clinic Number: [id number]    Therapy Diagnosis:   Encounter Diagnoses   Name Primary?    Chronic cervical pain     Activity intolerance      Physician: Sheree Metz MD      Start Time:  8:30 am   Stop Time:   9:30 am     Total Billable Time:  60  minutes    Subjective     Pt reports:  My neck is really bad; I haven't been here in a couple of weeks; Alessia had to cancel her appointments last week secondary to pancreatitis issues. She has been referred to a neurosurgeon to address possible surgery - appointment is in March.   Location:  Both sides of her neck; still has some headache over left eye.   Pain:  9/10 on right side of neck first thing this morning, now it's down to about  7/10 after hot shower.     Objective     Today, the patient continues to report of neck pain with radiation into her shoulders (L > R) and also is reporting a light headache on the left side of her face into forehead.        DNP  There-ex 40 min   UBE mid resistance 4/4 min   Rowing blue t band x 20   Wall climb x 10 each arm   Pulley x 3 min   Lev. scap 5 x 10 sec each   pec minor 5 x 10 sec each   Posture re-ed          Alessia received the following manual therapy techniques:  X 25 mins   Manual : supine : suboccipital inhibition; aggressive stretching of cervical mm - forward flexion of neck to end range with hold; extension off end of table with end range hold; rotation with flexion/SB to each side with contract relax (MET) to facilitate inhibition of tension;     Palpation Assessment to determine the necessity for Functional Dry Needling  Tenderness/tightness/migraine headaches - bilateral posterior cervical musculature; left/right upper trap; left/right levator; middle scalene, posterior scalene on left , left SCM, left temporal area. . .     Dry Needling:   Dry Needling  for 20  minutes.   Patient supine with knee bolster; LF-ES per  protocol; 2-point SCM protocol and 1 point into anterior scalene. 1 point middle scalene;  - 1TB above clavicle through SCM clavicular head; left TMJ area x 3 needles, corner of left eye, 2 points on inferior mandible; Needles left in-situ x 20 mins;   Call bell placed in reach of patient .      Moist heat to left side of c-spine x 15 mins     Home Exercises and Patient Education Provided     Education provided:   - continue with HEP; monitor symptoms follow DN     Written Handout on response to FDN provided: yes    Alessia demonstrated good  understanding of the education provided.   .     Assessment   ,Alessia is having increased pain in her neck today, she missed appointments last week secondary to illness;  Hopefully we can get her back to where she was a couple of weeks ago in terms of less pain.   Responded well to manual tx; we have been getting encouraging results with Dry Needling, .  Encouraged continued HEP to help with cervical/thoracic and shoulder posture and mm tightness.     Plan     Continue with Physical Therapy 1-2x/week; Dry Needling, Manual Treatment, TherEx and TherActivities.

## 2022-02-01 ENCOUNTER — CLINICAL SUPPORT (OUTPATIENT)
Dept: REHABILITATION | Facility: HOSPITAL | Age: 67
End: 2022-02-01
Payer: MEDICARE

## 2022-02-01 DIAGNOSIS — M54.2 CHRONIC CERVICAL PAIN: ICD-10-CM

## 2022-02-01 DIAGNOSIS — G89.29 CHRONIC CERVICAL PAIN: ICD-10-CM

## 2022-02-01 DIAGNOSIS — R68.89 ACTIVITY INTOLERANCE: ICD-10-CM

## 2022-02-01 PROCEDURE — 97140 MANUAL THERAPY 1/> REGIONS: CPT | Mod: PN

## 2022-02-01 NOTE — PROGRESS NOTES
Physical Therapy Functional Dry Needling Note       Date:  2/1/2022    Name: Alessia Nelson  Clinic Number: [id number]    Therapy Diagnosis:   Encounter Diagnoses   Name Primary?    Chronic cervical pain     Activity intolerance      Physician: Sheree Metz MD      Start Time:  1:45 pm   Stop Time:   3:00 pm      Total Billable Time:  60  minutes    Subjective     Pt reports:  I want you to do the same thing you did last time, my pain is so much better. Alessia reported that when she woke up day after treatment, she had no pain in her neck.   Location:  Both sides of her neck; still has some headache over left eye.   Pain:  5/10 on left side      Objective     Today, the patient continues to report of neck pain with radiation into her shoulders (L > R) and also is reporting a light headache on the left side of her face into forehead.        DNP  There-ex 40 min   UBE mid resistance 4/4 min   Rowing blue t band x 20   Wall climb x 10 each arm   Pulley x 3 min   Lev. scap 5 x 10 sec each   pec minor 5 x 10 sec each   Posture re-ed          Alessia received the following manual therapy techniques:  X 25 mins   Manual : supine : suboccipital inhibition; aggressive stretching of cervical mm - forward flexion of neck to end range with hold; extension off end of table with end range hold; rotation with flexion/SB to each side with contract relax (MET) to facilitate inhibition of tension;     Palpation Assessment to determine the necessity for Functional Dry Needling  Tenderness/tightness/migraine headaches - bilateral posterior cervical musculature; left/right upper trap; left/right levator; middle scalene, posterior scalene on left , left SCM, left temporal area. . .     Dry Needling:   Dry Needling  for 20  minutes.   Patient supine with knee bolster; LF-ES per protocol; 2-point SCM protocol and 1 point into anterior scalene. 1 point middle scalene;  - 1TB above clavicle through SCM clavicular head; left TMJ area x  3 needles, corner of left eye, 2 points on inferior mandible; Needles left in-situ x 20 mins;   Call bell placed in reach of patient .      Moist heat to left side of c-spine x 15 mins     Home Exercises and Patient Education Provided     Education provided:   - continue with HEP; monitor symptoms follow DN     Written Handout on response to FDN provided: yes    Alessia demonstrated good  understanding of the education provided.   .     Assessment   ,Alessia had very good results from dry needling last week, encouraged by pain levels coming down from 9/10 to 5/10.    Responded well to manual tx; we have been getting encouraging results with Dry Needling, .  Encouraged continued HEP to help with cervical/thoracic and shoulder posture and mm tightness.     Plan     Continue with Physical Therapy 1-2x/week; Dry Needling, Manual Treatment, TherEx and TherActivities.

## 2022-02-03 ENCOUNTER — CLINICAL SUPPORT (OUTPATIENT)
Dept: REHABILITATION | Facility: HOSPITAL | Age: 67
End: 2022-02-03
Payer: MEDICARE

## 2022-02-03 DIAGNOSIS — G89.29 CHRONIC CERVICAL PAIN: ICD-10-CM

## 2022-02-03 DIAGNOSIS — M54.2 CHRONIC CERVICAL PAIN: ICD-10-CM

## 2022-02-03 DIAGNOSIS — R68.89 ACTIVITY INTOLERANCE: ICD-10-CM

## 2022-02-03 PROCEDURE — 97140 MANUAL THERAPY 1/> REGIONS: CPT | Mod: PN

## 2022-02-03 NOTE — PROGRESS NOTES
Physical Therapy Functional Dry Needling Note       Date:  2/3/2022    Name: Alessia Nelson  Clinic Number: [id number]    Therapy Diagnosis:   Encounter Diagnoses   Name Primary?    Chronic cervical pain     Activity intolerance      Physician: Sheree Metz MD      Start Time:  1:45 pm   Stop Time:    2:45 pm     Total Billable Time:  60  minutes    Subjective     Pt reports:  I'm hurting a little today; I think the change in weather has something to do with it;    Location:  Left side of her neck; still has some headache over left eye.   Pain:  5/10 on left side      Objective     Having left SCM pain, left mastoid pain; very good response to manual tx and dry needling last couple of visits.         DNP  There-ex 40 min   UBE mid resistance 4/4 min   Rowing blue t band x 20   Wall climb x 10 each arm   Pulley x 3 min   Lev. scap 5 x 10 sec each   pec minor 5 x 10 sec each   Posture re-ed          Alessia received the following manual therapy techniques:  X 25 mins   Manual : supine : suboccipital inhibition; aggressive stretching of cervical mm - forward flexion of neck to end range with hold; extension off end of table with end range hold; rotation with flexion/SB to each side with contract relax (MET) to facilitate inhibition of tension;     Palpation Assessment to determine the necessity for Functional Dry Needling  Tenderness/tightness/migraine headaches - bilateral posterior cervical musculature; left/right upper trap; left/right levator; middle scalene, posterior scalene on left , left SCM, left temporal area. . .     Dry Needling:   Dry Needling  for 20  minutes.   Patient supine with knee bolster; LF-ES per protocol; 2-point SCM protocol and 1 point into anterior scalene. 1 point middle scalene;  - 1TB above clavicle through SCM clavicular head; left TMJ area x 3 needles, corner of left eye, 2 points on inferior mandible; Needles left in-situ x 20 mins;   Call bell placed in reach of patient .       Moist heat to left side of c-spine x 15 mins     Home Exercises and Patient Education Provided     Education provided:   - continue with HEP; monitor symptoms follow DN     Written Handout on response to FDN provided: yes    Alessia demonstrated good  understanding of the education provided.   .     Assessment   ,Alessia has been responding well to dry needling of her SCM on left side;     Responded well to manual tx; we have been getting encouraging results with Dry Needling, .  Encouraged continued HEP to help with cervical/thoracic and shoulder posture and mm tightness.     Plan     Continue with Physical Therapy 1-2x/week; Dry Needling, Manual Treatment, TherEx and TherActivities.

## 2022-02-09 ENCOUNTER — CLINICAL SUPPORT (OUTPATIENT)
Dept: REHABILITATION | Facility: HOSPITAL | Age: 67
End: 2022-02-09
Payer: MEDICARE

## 2022-02-09 DIAGNOSIS — M54.2 NECK PAIN: ICD-10-CM

## 2022-02-09 DIAGNOSIS — G89.29 CHRONIC CERVICAL PAIN: ICD-10-CM

## 2022-02-09 DIAGNOSIS — M43.6 TORTICOLLIS: ICD-10-CM

## 2022-02-09 DIAGNOSIS — M54.2 CHRONIC CERVICAL PAIN: ICD-10-CM

## 2022-02-09 DIAGNOSIS — R68.89 ACTIVITY INTOLERANCE: ICD-10-CM

## 2022-02-09 DIAGNOSIS — M43.6 TORTICOLLIS: Primary | ICD-10-CM

## 2022-02-09 PROCEDURE — 97140 MANUAL THERAPY 1/> REGIONS: CPT | Mod: PN

## 2022-02-09 NOTE — PROGRESS NOTES
Physical Therapy Functional Dry Needling Note       Date:  2/9/2022    Name: Alessia Nelson  Clinic Number: [id number]    Therapy Diagnosis:   Encounter Diagnoses   Name Primary?    Torticollis     Neck pain     Chronic cervical pain     Activity intolerance      Physician: Sera Crystal FNP      Start Time:  1:45 pm   Stop Time:    2:45 pm     Total Billable Time:  60  minutes    Subjective     Pt reports:  I'm hurting today, I just came from Sera Crystal's office; I told her that we needed to find someone who specializes in torticollis - I can't keep doing therapy for ever.   Location:  Left side of her neck; still has some headache over left eye.   Pain:  5/10 on left side      Objective     Having left SCM pain, left mastoid pain; very good response to manual tx and dry needling last couple of visits.         DNP  There-ex 40 min   UBE mid resistance 4/4 min   Rowing blue t band x 20   Wall climb x 10 each arm   Pulley x 3 min   Lev. scap 5 x 10 sec each   pec minor 5 x 10 sec each   Posture re-ed          Alessia received the following manual therapy techniques:  X 25 mins   Manual : supine : suboccipital inhibition; aggressive stretching of cervical mm - forward flexion of neck to end range with hold; extension off end of table with end range hold; rotation with flexion/SB to each side with contract relax (MET) to facilitate inhibition of tension;     Palpation Assessment to determine the necessity for Functional Dry Needling  Tenderness/tightness/migraine headaches - bilateral posterior cervical musculature; left/right upper trap; left/right levator; middle scalene, posterior scalene on left , left SCM, left temporal area. . .     Dry Needling:   Dry Needling  for 20  minutes.   Patient supine with knee bolster; LF-ES per protocol; 2-point SCM protocol and 1 point into anterior scalene. 1 point middle scalene;  - 1TB above clavicle through SCM clavicular head; left TMJ area x 3 needles, corner of  left eye, 2 points on inferior mandible; Needles left in-situ x 20 mins;   Call bell placed in reach of patient .      Moist heat to left side of c-spine x 15 mins     Home Exercises and Patient Education Provided     Education provided:   - continue with HEP; monitor symptoms follow DN     Written Handout on response to FDN provided: yes    Alessia demonstrated good  understanding of the education provided.   .     Assessment   ,Alessia has been responding well to dry needling of her SCM on left side;     Responded well to manual tx; we have been getting encouraging results with Dry Needling, .  Encouraged continued HEP to help with cervical/thoracic and shoulder posture and mm tightness.     Plan     Continue with Physical Therapy 1-2x/week; Dry Needling, Manual Treatment, TherEx and TherActivities.

## 2022-02-11 ENCOUNTER — CLINICAL SUPPORT (OUTPATIENT)
Dept: REHABILITATION | Facility: HOSPITAL | Age: 67
End: 2022-02-11
Payer: MEDICARE

## 2022-02-11 DIAGNOSIS — M54.2 CHRONIC CERVICAL PAIN: ICD-10-CM

## 2022-02-11 DIAGNOSIS — R68.89 ACTIVITY INTOLERANCE: ICD-10-CM

## 2022-02-11 DIAGNOSIS — G89.29 CHRONIC CERVICAL PAIN: ICD-10-CM

## 2022-02-11 PROCEDURE — 97140 MANUAL THERAPY 1/> REGIONS: CPT | Mod: PN

## 2022-02-11 NOTE — PROGRESS NOTES
"Physical Therapy Functional Dry Needling Note       Date:  2/11/2022    Name: Alessia Nelson  Clinic Number: [id number]    Therapy Diagnosis:   Encounter Diagnoses   Name Primary?    Chronic cervical pain     Activity intolerance      Physician: Sera Crystal FNP      Start Time:  7:45 am   Stop Time:    8:53 am    Total Billable Time:  60  minutes    Subjective     Pt reports:  I'm doing better today"  Wilfrido has an appointment with Sera - neurology - this morning; Wants to ask her about using Botox injections as a way to reduce pain in SCM/Cervical   Location:  Left side of her neck; still has some headache over left eye.   Pain:  4/10 on left side      Objective     Having left SCM pain, left mastoid pain; very good response to manual tx and dry needling last couple of visits. - Pain at 4/10 today.         DNP  There-ex 40 min   UBE mid resistance 4/4 min   Rowing blue t band x 20   Wall climb x 10 each arm   Pulley x 3 min   Lev. scap 5 x 10 sec each   pec minor 5 x 10 sec each   Posture re-ed          Alessia received the following manual therapy techniques:  X 25 mins   Manual : supine : suboccipital inhibition; aggressive stretching of cervical mm - forward flexion of neck to end range with hold; extension off end of table with end range hold; rotation with flexion/SB to each side with contract relax (MET) to facilitate inhibition of tension;     Palpation Assessment to determine the necessity for Functional Dry Needling  Tenderness/tightness/migraine headaches - bilateral posterior cervical musculature; left/right upper trap; left/right levator; middle scalene, posterior scalene on left , left SCM, left temporal area. . .     Dry Needling:   Dry Needling  for 20  minutes.   Patient supine with knee bolster; LF-ES per protocol; 2-point SCM protocol and 1 point into anterior scalene. 1 point middle scalene;  - 1TB above clavicle through SCM clavicular head; left TMJ area x 3 needles, corner of left eye, " 2 points on inferior mandible; Needles left in-situ x 20 mins;   Call bell placed in reach of patient .      Moist heat to left side of c-spine x 15 mins     Home Exercises and Patient Education Provided     Education provided:   - continue with HEP; monitor symptoms follow DN     Written Handout on response to FDN provided: yes    Alessia demonstrated good  understanding of the education provided.   .     Assessment   ,Alessia has been responding well to dry needling of her SCM on left side;     Responded well to manual tx; we have been getting encouraging results with Dry Needling, .  Encouraged continued HEP to help with cervical/thoracic and shoulder posture and mm tightness.     Plan     Continue with Physical Therapy 1-2x/week; Dry Needling, Manual Treatment, TherEx and TherActivities.  Update POC

## 2022-02-11 NOTE — PLAN OF CARE
"[facility]  Physical Therapy Plan of Care Note    Name: Alessia Nelson  Clinic Number: [id number]    Therapy Diagnosis:   Encounter Diagnoses   Name Primary?    Chronic cervical pain     Activity intolerance      Physician: Sera Crystal FNP    Visit Date: 2/11/2022    Physician Orders: PT Eval and Treat   Medical Diagnosis from Referral:  Torticolils, Chronic neck pain   Evaluation Date: 2/11/2022  Authorization Period Expiration: 12/31/2022   Plan of Care Expiration: 5/11/2022  Progress Note Due: 2/28/2022   Visit # / Visits authorized: 2/ 12  FOTO: 1/3    Precautions: Standard  Functional Level Prior to Evaluation:  Independently ambulatory; chronic cervical pain with headaches, Improvement in pain noted since beginning dry needling on SCM several weeks ago.  Pain has reduced from 7-8/10 to 4-5/10 on good days; She has noted a reduction in her headaches with the dry needling as well.  Alessia has several co-morbidities that impact her overall well being - pancreatitis, CAD, CHF, COPD, Stroke and Thyroid disease - in addition to her chronic neck and lower back pain and activity tolerance     SUBJECTIVE     Update: Alessia has completed around 32 therapy visits since starting in September 2021 for treatment of cervical pain and headaches.  We have made significant gains in reducing her pain levels and improving ROM since changing the focus of Dry Needling treatments to her SCM and mastoid, left TMJ and left orbital area. Pain levels reduced from 7-8/10 to around 4-5/10.     OBJECTIVE     Update:    Range of Motion/Strength:    Cervical A/PROM: Pain/Dysfunction with Movement:   Flexion                         38*/48* > 40/48  "My neck was grinding a little"   Extension                    25*/32* > 28 /33     Right side bending      20*/25* > 20/25     Left side bending        15*/25* > 20 / 25     Right rotation              30*/35* > 35/35  end range discomfort   Left rotation         "        30*/35* > 32 / 35   end range discomfort      B UE              ROM grossly WFL              Strength grossly 4/5-4+/5    Flexibility: Significant limitation in B Upper traps, levator scap, pectoralis mm        ASSESSMENT     Update: After changing Dry Needling approach from posterior cervical spine > Cervicogenic Headache - facial approach > Left SCM, Left scalene and mastoid approach, Alessia is finally starting to note reduction in her pain scores and slight improvement in her cervical AROM.     Goals:  Short Term Goals:                1) Decrease max pain to < 6/10 Progressing              2) Decrease tenderness/excess muscle tension to minimal/moderate Progressing              3) Facilitate improved posture Progressing              4) Facilitate improved upper quadrant flexibility Progressing     Long Term Goals:                1) Patient will consistently drive and look over her shoulder without having to turn trunk excessively Progressing              2) Patient will consistently work on puzzle (look down) without increased pain Ongoing              3) Patient will consistently reach into overhead cabinet (to place or retrieve dish) without increased pain Ongoing              4) Patient will sleep > 6 hours per night without interruption by neck pain 5 of 7 days per week Ongoing              5) Improve functional deficit to < 45% as indicated by FOTO neck survey Minimal progress              6) Patient will be independent in HEP.   Progressing     Long Term Goal Status: continue per initial plan of care.  Reasons for Recertification of Therapy:   Reduction in pain and improvement in AROM in cervical spine noted; Reduction in Headaches, ability to sleep for > 5 hours several nights per week without interruption by neck pain.     PLAN     Updated Certification Period: 2/11/2022 to 4/1/2022   Recommended Treatment Plan: 1-2 times per week for 6 weeks:  Manual Therapy, Moist Heat/ Ice, Patient Education and  Therapeutic Exercise      Trinh Reyes, PT    I CERTIFY THE NEED FOR THESE SERVICES FURNISHED UNDER THIS PLAN OF TREATMENT AND WHILE UNDER MY CARE  Physician's comments:      Physician's Signature: ___________________________________________________

## 2022-02-14 NOTE — PLAN OF CARE
Problem: Fall Injury Risk  Goal: Absence of Fall and Fall-Related Injury    Intervention: Identify and Manage Contributors to Fall Injury Risk     04/19/19 1000   Manage Acute Allergic Reaction   Medication Review/Management medications reviewed;high risk medications identified   Identify and Manage Contributors to Fall Injury Risk   Self-Care Promotion independence encouraged;BADL personal objects within reach;BADL personal routines maintained;safe use of adaptive equipment encouraged            Subjective:      Patient ID: Alonso Guzman is a 37 y.o. male. HPI  Alonso Guzman, was evaluated through a synchronous (real-time) audio-video encounter. The patient (or guardian if applicable) is aware that this is a billable service, which includes applicable co-pays. This Virtual Visit was conducted with patient's (and/or legal guardian's) consent. The visit was conducted pursuant to the emergency declaration under the [address] [place] [address], [address] [place] [address] authority and the [employer] Act. Patient identification was verified, and a caregiver was present when appropriate. The patient was located in a state where the provider was licensed to provide care. Total time spent for this encounter: Not billed by time    --Autumn Rose DO on 2/14/2022 at 8:43 AM    An electronic signature was used to authenticate this note. Depression: Patient had profuse sweating with Prozac.  He was then prescribed Remeron 15 mg nightly. He stopped taking it as well. He feels that he is doing well without medication and denies any suicidal ideation.        Anxiety:  Patient takes Xanax 1 mg TID prn. Theola Cool feels that it works well to control his anxiety. Theola Cool does not always take it TID.     Review of Systems   Constitutional: Negative for chills and fever. Psychiatric/Behavioral: Positive for dysphoric mood. Negative for agitation, decreased concentration, sleep disturbance and suicidal ideas. The patient is nervous/anxious. BP Readings from Last 3 Encounters:   10/26/21 124/83   10/26/21 (!) 129/97   10/06/21 (!) 136/92       There were no vitals taken for this visit. Objective:   Physical Exam  Constitutional:       General: He is not in acute distress. Appearance: Normal appearance. He is normal weight. He is not ill-appearing or toxic-appearing. HENT:      Head: Normocephalic.    Pulmonary:      Effort: Pulmonary effort is normal.   Neurological:      Mental Status: He is alert and oriented to person, place, and time. Psychiatric:         Mood and Affect: Mood normal.         Behavior: Behavior normal.         Thought Content: Thought content normal.         Judgment: Judgment normal.         Assessment:      Depression   Anxiety       Plan:      [facility] report was reviewed  Medications refilled   I recommended the COVID shots  RTO 3 months for Depression / Anxiety (in the office for labs). Controlled Substance Monitoring:    Acute and Chronic Pain Monitoring:   RX Monitoring 2/15/2022   Attestation -   Periodic Controlled Substance Monitoring No signs of potential drug abuse or diversion identified.               Glenn Bishop, DO

## 2022-02-16 ENCOUNTER — CLINICAL SUPPORT (OUTPATIENT)
Dept: REHABILITATION | Facility: HOSPITAL | Age: 67
End: 2022-02-16
Payer: MEDICARE

## 2022-02-16 DIAGNOSIS — R68.89 ACTIVITY INTOLERANCE: ICD-10-CM

## 2022-02-16 DIAGNOSIS — M54.2 CHRONIC CERVICAL PAIN: ICD-10-CM

## 2022-02-16 DIAGNOSIS — G89.29 CHRONIC CERVICAL PAIN: ICD-10-CM

## 2022-02-16 PROCEDURE — 97140 MANUAL THERAPY 1/> REGIONS: CPT | Mod: PN

## 2022-02-16 PROCEDURE — 97110 THERAPEUTIC EXERCISES: CPT | Mod: PN

## 2022-02-16 NOTE — PROGRESS NOTES
"Physical Therapy Functional Dry Needling Note       Date:  2/16/2022    Name: Alessia Nelson  Clinic Number: [id number]    Therapy Diagnosis:   Encounter Diagnoses   Name Primary?    Chronic cervical pain     Activity intolerance      Physician: Sera Crystal FNP      Start Time:  7:45 am   Stop Time:    9:00 am    Total Billable Time:  60  minutes    Subjective     Pt reports:  I'm doing better today"  My neck feels much looser, I did tweak my lower back the other day - so that is bothering me a little bit more. Alessia stated that she is being referred to a neurologist in [place] for Botox injections - will let me know when she gets an appointment.   Location:  Left side of her neck; still has some headache over left eye.   Pain:  3/10 on left side ; upper/lower back pain this morning - 5/10     Objective     Having left SCM pain, left mastoid pain; very good response to manual tx and dry needling last couple of visits. - Pain at 3/10 today.         DNP  There-ex 40 min   UBE mid resistance 4/4 min   Rowing blue t band x 20   Wall climb x 10 each arm   Pulley x 3 min   Lev. scap 5 x 10 sec each   pec minor 5 x 10 sec each   Posture re-ed          Alessia received the following manual therapy techniques:  X 25 mins   Manual : supine : suboccipital inhibition; aggressive stretching of cervical mm - forward flexion of neck to end range with hold; extension off end of table with end range hold; rotation with flexion/SB to each side with contract relax (MET) to facilitate inhibition of tension;   Small Swim noodle horizontal at T3-4 - patient performed bilateral shoulder flexion with 2# overhead to promote thoracic extension - performed x 20 reps   Changed noodle to vertical along spine - snow angles with scapular retraction x 10 - again for improved thoracic mobility.     Palpation Assessment to determine the necessity for Functional Dry Needling  Tenderness/tightness/migraine headaches - bilateral posterior " cervical musculature; left/right upper trap; left/right levator; middle scalene, posterior scalene on left , left SCM, left temporal area. . .     Dry Needling:   Dry Needling  for 20  minutes.   Patient supine with knee bolster; LF-ES per protocol; 2-point SCM protocol and 1 point into anterior scalene. 1 point middle scalene;  - 1TB above clavicle through SCM clavicular head; left TMJ area x 3 needles, corner of left eye, 2 points on inferior mandible; Needles left in-situ x 20 mins;   Call bell placed in reach of patient .      Moist heat to left side of c-spine x 15 mins     Home Exercises and Patient Education Provided     Education provided:   - continue with HEP; monitor symptoms follow DN     Written Handout on response to FDN provided: yes    Alessia demonstrated good  understanding of the education provided.   .     Assessment   ,Alessia has been responding well to dry needling of her SCM on left side;     Responded well to manual tx; we have been getting encouraging results with Dry Needling, .  Encouraged continued HEP to help with cervical/thoracic and shoulder posture and mm tightness.     Plan     Continue with Physical Therapy 1-2x/week; Dry Needling, Manual Treatment, TherEx and TherActivities.

## 2022-02-18 ENCOUNTER — TELEPHONE (OUTPATIENT)
Dept: ENDOSCOPY | Facility: HOSPITAL | Age: 67
End: 2022-02-18
Payer: MEDICARE

## 2022-02-18 NOTE — TELEPHONE ENCOUNTER
----- Message from Melia Ronquillo sent at 2/18/2022  2:33 PM CST -----  Regarding: pt  Pt is calling to speak with Torri in ref to her procedure appt can you please call pt at [phone].    JULIANO

## 2022-02-21 ENCOUNTER — CLINICAL SUPPORT (OUTPATIENT)
Dept: REHABILITATION | Facility: HOSPITAL | Age: 67
End: 2022-02-21
Payer: MEDICARE

## 2022-02-21 DIAGNOSIS — M54.2 CHRONIC CERVICAL PAIN: Primary | ICD-10-CM

## 2022-02-21 DIAGNOSIS — R68.89 ACTIVITY INTOLERANCE: ICD-10-CM

## 2022-02-21 DIAGNOSIS — G89.29 CHRONIC CERVICAL PAIN: Primary | ICD-10-CM

## 2022-02-21 PROCEDURE — 97140 MANUAL THERAPY 1/> REGIONS: CPT | Mod: PN

## 2022-02-21 NOTE — PROGRESS NOTES
"Physical Therapy Functional Dry Needling Note       Date:  2/21/2022    Name: Alessia Nelson  Clinic Number: [id number]    Therapy Diagnosis:   Encounter Diagnoses   Name Primary?    Chronic cervical pain Yes    Activity intolerance      Physician: Sera Crystal FNP      Start Time:  2:25 pm   Stop Time:   3:40 pm   Total Billable Time:  60  minutes  /  Subjective     Pt reports:  I'm doing ok today"  Reports her back and neck are hurting; having continual problems with her stomach - bloating, pain, burning; She has an appointment for colonoscopy in March.   Location:  Left side of her neck; still has some headache over left eye.   Pain:  3-4/10 on left side ; upper/lower back pain this morning - 5/10     Objective     Having left SCM pain, left mastoid pain; very good response to manual tx and dry needling last couple of visits. - Pain at 3/10 today.         DNP  There-ex 40 min   UBE mid resistance 4/4 min   Rowing blue t band x 20   Wall climb x 10 each arm   Pulley x 3 min   Lev. scap 5 x 10 sec each   pec minor 5 x 10 sec each   Posture re-ed          Alessia received the following manual therapy techniques:  X 25 mins   Manual : supine : suboccipital inhibition; aggressive stretching of cervical mm - forward flexion of neck to end range with hold; extension off end of table with end range hold; rotation with flexion/SB to each side with contract relax (MET) to facilitate inhibition of tension;   Small Swim noodle horizontal at T3-4 - patient performed bilateral shoulder flexion with 2# overhead to promote thoracic extension - performed x 20 reps   Changed noodle to vertical along spine - snow angles with scapular retraction x 10 - again for improved thoracic mobility.     Palpation Assessment to determine the necessity for Functional Dry Needling  Tenderness/tightness/migraine headaches - bilateral posterior cervical musculature; left/right upper trap; left/right levator; middle scalene, posterior scalene " on left , left SCM, left temporal area. . .     Dry Needling:   Dry Needling  for 20  minutes.   Patient supine with knee bolster; LF-ES per protocol; 2-point SCM protocol and 1 point into anterior scalene. 1 point middle scalene;  - 1TB above clavicle through SCM clavicular head; left TMJ area x 3 needles, corner of left eye, 2 points on inferior mandible; Needles left in-situ x 20 mins;   Call bell placed in reach of patient .      Moist heat to left side of c-spine x 15 mins     Home Exercises and Patient Education Provided     Education provided:   - continue with HEP; monitor symptoms follow DN     Written Handout on response to FDN provided: yes    Alessia demonstrated good  understanding of the education provided.   .     Assessment   ,Alessia has been responding well to dry needling of her SCM on left side;     Responded well to manual tx; we have been getting encouraging results with Dry Needling, .  Encouraged continued HEP to help with cervical/thoracic and shoulder posture and mm tightness.     Plan     Continue with Physical Therapy 1-2x/week; Dry Needling, Manual Treatment, TherEx and TherActivities.

## 2022-02-23 RX ORDER — OXYCODONE AND ACETAMINOPHEN 10; 325 MG/1; MG/1
1 TABLET ORAL EVERY 8 HOURS PRN
Qty: 90 TABLET | Refills: 0 | Status: SHIPPED | OUTPATIENT
Start: 2022-02-23 | End: 2022-06-01 | Stop reason: SDUPTHER

## 2022-02-23 NOTE — TELEPHONE ENCOUNTER
----- Message from Mackenzie Sullivan sent at 2/23/2022  2:07 PM CST -----  Contact: pt  Type:  RX Refill Request    Who Called:  pt  Refill or New Rx: refill  RX Name and Strength:  oxyCODONE-acetaminophen (PERCOCET)  mg per tablet  How is the patient currently taking it? (ex. 1XDay): Take 1 tablet by mouth every 8 (eight) hours as needed for Pain  Is this a 30 day or 90 day RX: 30  Preferred Pharmacy with phone number:   [id number] - [place] [address] AT [facility] [address]  [address] [address]  Phone: [phone] Fax: [phone]  Best Call Back Number: [phone]  Additional Information:

## 2022-02-24 ENCOUNTER — TELEPHONE (OUTPATIENT)
Dept: CARDIOLOGY | Facility: CLINIC | Age: 67
End: 2022-02-24
Payer: MEDICARE

## 2022-02-24 ENCOUNTER — TELEPHONE (OUTPATIENT)
Dept: ENDOSCOPY | Facility: HOSPITAL | Age: 67
End: 2022-02-24
Payer: MEDICARE

## 2022-02-24 NOTE — LETTER
2022    Alessia Nelson  [address] [place] [address]       [facility] - Cardiology  [address] [facility] [facility], [address] [address]  Phone: [phone]  Fax: [phone] Patient: Alessia Nelson  : 1955  Referring Doctor:  Type of Procedure: Colonoscopy & lower EUS    Current Outpatient Medications   Medication Sig    acetaminophen (TYLENOL) 325 MG tablet Take 2 tablets (650 mg total) by mouth every 6 (six) hours as needed for Pain (Do not take with any other tylenol containing products).    allopurinoL (ZYLOPRIM) 300 MG tablet TAKE 1 TABLET EVERY DAY    baclofen (LIORESAL) 10 MG tablet Take 1 tablet (10 mg total) by mouth 3 (three) times daily as needed (myofascial pain).    buPROPion (WELLBUTRIN XL) 150 MG TB24 tablet TAKE 1 TABLET EVERY DAY    clopidogreL (PLAVIX) 75 mg tablet TAKE 1 TABLET EVERY DAY    colestipoL (COLESTID) 1 gram Tab TAKE 2 TABLETS ONE TIME DAILY    cyanocobalamin 1,000 mcg/mL injection Inject 1 mL (1,000 mcg total) into the muscle every 14 (fourteen) days.    EScitalopram oxalate (LEXAPRO) 20 MG tablet Take 1 tablet (20 mg total) by mouth every evening.    gabapentin (NEURONTIN) 400 MG capsule TAKE 1 CAPSULE TWICE DAILY    hydroCHLOROthiazide (HYDRODIURIL) 12.5 MG Tab Take 1 tablet (12.5 mg total) by mouth every Mon, Wed, Fri.    levothyroxine (SYNTHROID) 75 MCG tablet Take 1 tablet (75 mcg total) by mouth once daily.    magnesium oxide (MAG-OX) 400 mg (241.3 mg magnesium) tablet Take 1 tablet (400 mg total) by mouth once daily.    metoprolol tartrate (LOPRESSOR) 100 MG tablet Take 0.5 tablets (50 mg total) by mouth Daily.    ondansetron (ZOFRAN) 4 MG tablet Take 2 tablets (8 mg total) by mouth every 8 (eight) hours as needed for Nausea.    oxyCODONE-acetaminophen (PERCOCET)  mg per tablet Take 1 tablet by mouth every 8 (eight) hours as needed for Pain.    oxyCODONE-acetaminophen (PERCOCET)  mg per tablet Take 1 tablet by mouth every 8 (eight)  hours as needed for Pain.    oxyCODONE-acetaminophen (PERCOCET)  mg per tablet Take 1 tablet by mouth every 8 (eight) hours as needed for Pain.    pantoprazole (PROTONIX) 40 MG tablet TAKE 1 TABLET EVERY DAY    potassium chloride SA (K-DUR,KLOR-CON) 20 MEQ tablet TAKE 1 TABLET EVERY DAY    sumatriptan (IMITREX) 50 MG tablet Take 1 tablet (50 mg total) by mouth every 2 (two) hours as needed for Migraine (50 to 100 mg as a single dose ([facility] [Quinn 2013]; Dilip 2012). If symptoms persist or return, may repeat dose (usually same as first dose) after =2 hours. Maximum dose: 100 mg/dose; 200 mg per 24 hours.).    TENS unit and electrodes Cmpk 1 Units by Misc.(Non-Drug; Combo Route) route daily as needed.    traZODone (DESYREL) 100 MG tablet TAKE 1 TABLET EVERY EVENING    ZENPEP 10,000-32,000 -42,000 unit CpDR Take 1 capsule by mouth 3 (three) times daily with meals.     No current facility-administered medications for this visit.     Facility-Administered Medications Ordered in Other Visits   Medication    electrolyte-S (ISOLYTE)    fentaNYL 50 mcg/mL injection 25 mcg    HYDROmorphone injection 0.2 mg    lactated ringers infusion    lactated ringers infusion    lactated ringers infusion    LIDOcaine (PF) 10 mg/ml (1%) injection 10 mg    lorazepam injection 0.25 mg    oxyCODONE immediate release tablet 5 mg    prochlorperazine injection Soln 5 mg    sodium chloride 0.9% flush 3 mL       This patient has been assessed for risk factors for clearance of surgery with the following stipulations:    __x_ No contraindications  __x_ Recommendations for antiplatelet/anticoagulant medications: ok to hold plavix for 5 days prior  __x_ Cleared for surgery  ___ Not cleared for surgery due to the following reasons:      If you have any questions regarding the above, please contact my office at [phone]    Sincerely,  Naheed Pinto PA-C   [facility] - Department of Cardiology  Office: [phone]

## 2022-02-24 NOTE — TELEPHONE ENCOUNTER
The procedure is being done by Dr. Derrick Bridges with our Advanced Endoscopy Team at [facility].

## 2022-02-24 NOTE — TELEPHONE ENCOUNTER
Good afternoon,     Pt has a Colonoscopy and Lower EUS scheduled on 3/8/22.  Is pt able to hold Plavix for 5 days prior to procedure per endoscopy protocol?  Please advise.

## 2022-02-28 ENCOUNTER — CLINICAL SUPPORT (OUTPATIENT)
Dept: REHABILITATION | Facility: HOSPITAL | Age: 67
End: 2022-02-28
Payer: MEDICARE

## 2022-02-28 ENCOUNTER — TELEPHONE (OUTPATIENT)
Dept: ENDOSCOPY | Facility: HOSPITAL | Age: 67
End: 2022-02-28
Payer: MEDICARE

## 2022-02-28 DIAGNOSIS — R68.89 ACTIVITY INTOLERANCE: ICD-10-CM

## 2022-02-28 DIAGNOSIS — M54.2 CHRONIC CERVICAL PAIN: Primary | ICD-10-CM

## 2022-02-28 DIAGNOSIS — G89.29 CHRONIC CERVICAL PAIN: Primary | ICD-10-CM

## 2022-02-28 PROCEDURE — 97140 MANUAL THERAPY 1/> REGIONS: CPT | Mod: PN

## 2022-02-28 NOTE — PROGRESS NOTES
"Physical Therapy Functional Dry Needling Note       Date:  2/28/2022    Name: Alessia Nelson  Clinic Number: [id number]    Therapy Diagnosis:   Encounter Diagnoses   Name Primary?    Chronic cervical pain Yes    Activity intolerance      Physician: Sera Crystal FNP      Start Time:  12:20 pm   Stop Time:  1:45  pm   Total Billable Time:  60  minutes  /  Subjective     Pt reports: "I'm hurting on the right side of my head now, also reports pain in lower back, hip and right leg. .   Location:  Left side of her neck; still has some headache over left eye.   Pain:  3-4/10 on left side ; upper/lower back pain this morning - 5/10     Objective     Having more cervical/neck pain today, right/left side of neck, upper thoracic pain, along with back and right knee/leg       DNP  There-ex 40 min   UBE mid resistance 4/4 min   Rowing blue t band x 20   Wall climb x 10 each arm   Pulley x 3 min   Lev. scap 5 x 10 sec each   pec minor 5 x 10 sec each   Posture re-ed        t provi  Alessia received the following manual therapy techniques:  X 25 mins   Manual : supine : suboccipital inhibition; aggressive stretching of cervical mm - forward flexion of neck to end range with hold; extension off end of table with end range hold; rotation with flexion/SB to each side with contract relax (MET) to facilitate inhibition of tension;   Had patient sit at edge of mat, using pulsating theragun, worked tissues along bilateral upper traps, rhomboids, PT standing in front of patient - MET with patient providing light resistance - thoracic extension - 15 sec hold x 4 reps.     Palpation Assessment to determine the necessity for Functional Dry Needling  Tenderness/tightness/migraine headaches - bilateral posterior cervical musculature; left/right upper trap; left/right levator; middle scalene, posterior scalene on left , left SCM, left temporal area. . .     Dry Needling:   Dry Needling  for 20  minutes.   Patient supine with knee bolster; " LF-ES per protocol; 2-point SCM protocol and 1 point into anterior scalene. 1 point middle scalene;  - 1TB above clavicle through SCM clavicular head; left TMJ area x 3 needles, corner of left eye, 2 points on inferior mandible; Needles left in-situ x 20 mins;   Call bell placed in reach of patient .      Moist heat to c-spine x 20 mins     Home Exercises and Patient Education Provided     Education provided:   - continue with HEP; monitor symptoms follow DN     Written Handout on response to FDN provided: yes    Alessia demonstrated good  understanding of the education provided.   .     Assessment   ,Alessia has been responding well to dry needling of her SCM on left side, having increased pain on right side of neck, upper thoracic, lower back and RLE today; Will re-assess at next visit.      Responded well to manual tx; we have been getting encouraging results with Dry Needling, .  Encouraged continued HEP to help with cervical/thoracic and shoulder posture and mm tightness.     Plan     Continue with Physical Therapy 1-2x/week; Dry Needling, Manual Treatment, TherEx and TherActivities.

## 2022-02-28 NOTE — TELEPHONE ENCOUNTER
Good afternoon,     Pt has a Colonoscopy and Lower EUS scheduled with Dr. Derrick Bridges on 3/8/22.  Is pt able to hold Plavix for 5 days prior to procedure per endoscopy protocol?  Please advise.

## 2022-03-02 ENCOUNTER — TELEPHONE (OUTPATIENT)
Dept: ENDOSCOPY | Facility: HOSPITAL | Age: 67
End: 2022-03-02
Payer: MEDICARE

## 2022-03-02 ENCOUNTER — PATIENT MESSAGE (OUTPATIENT)
Dept: ENDOSCOPY | Facility: HOSPITAL | Age: 67
End: 2022-03-02
Payer: MEDICARE

## 2022-03-02 DIAGNOSIS — Z12.11 SCREEN FOR COLON CANCER: Primary | ICD-10-CM

## 2022-03-02 RX ORDER — SODIUM, POTASSIUM,MAG SULFATES 17.5-3.13G
1 SOLUTION, RECONSTITUTED, ORAL ORAL DAILY
Qty: 1 KIT | Refills: 0 | Status: SHIPPED | OUTPATIENT
Start: 2022-03-02 | End: 2022-03-04

## 2022-03-02 NOTE — TELEPHONE ENCOUNTER
Received request to schedule patient for Colonoscopy with EUS on 3/8/2022 at 11:00am.  Spoke with patient.  Reviewed medical history and medications.  Instructed on procedure and prep.  Also, instructed her Dr. Walsh approved for her to hold her Plavix for 5 days prior to procedure and to begin holding it tomorrow 3/3.  Patient verbalized understanding of instructions.  Copy of instructions sent via email.    Pt to perform at-home COVID test morning of procedure.   Instructed pt if test is positive, notify Endoscopy Scheduling at [phone] and the procedure will be cancelled. Pt instructed proof of negative test not needed - if false negative is reported they are compromising themselves, other patients, and staff members.  Pt also made aware that if they are positive and report a negative result, receiving anesthesia can compromise her respiratory status.  Pt verbalized understanding.

## 2022-03-02 NOTE — TELEPHONE ENCOUNTER
Good afternoon,     Pt has a Colonoscopy and Lower EUS scheduled on 3/8/22.  Is pt able to hold Plavix for 5 days prior to procedure per endoscopy protocol?  Please advise. 3rd Request

## 2022-03-03 ENCOUNTER — TELEPHONE (OUTPATIENT)
Dept: PAIN MEDICINE | Facility: CLINIC | Age: 67
End: 2022-03-03
Payer: MEDICARE

## 2022-03-03 ENCOUNTER — CLINICAL SUPPORT (OUTPATIENT)
Dept: REHABILITATION | Facility: HOSPITAL | Age: 67
End: 2022-03-03
Payer: MEDICARE

## 2022-03-03 DIAGNOSIS — G89.29 CHRONIC CERVICAL PAIN: Primary | ICD-10-CM

## 2022-03-03 DIAGNOSIS — M54.2 CHRONIC CERVICAL PAIN: Primary | ICD-10-CM

## 2022-03-03 DIAGNOSIS — R68.89 ACTIVITY INTOLERANCE: ICD-10-CM

## 2022-03-03 PROCEDURE — 97140 MANUAL THERAPY 1/> REGIONS: CPT | Mod: PN

## 2022-03-03 NOTE — PROGRESS NOTES
Physical Therapy Functional Dry Needling Note       Date:  3/3/2022    Name: Alessia Nelson  Clinic Number: [id number]    Therapy Diagnosis:   Encounter Diagnoses   Name Primary?    Chronic cervical pain Yes    Activity intolerance      Physician: Sera Crystal FNP      Start Time:  12:15 pm   Stop Time:  1:45  pm   Total Billable Time:  60  minutes  /  Subjective     Pt reports: Patient continues to report multi-joint pain, neck, right knee, lower back; Also reports increase in retention of fluid; limited urination and BM. Scheduled for colonoscopy at [facility] on Tuesday.  Noted swelling in ankles/hands today.    Location:  Left side of her neck; still has some headache over left eye.   Pain:  3-4/10 on left side ; upper/lower back pain this morning - 5/10     Objective     Having more cervical/neck pain today, right/left side of neck, upper thoracic pain, along with back and right knee/leg       DNP  There-ex 40 min   UBE mid resistance 4/4 min   Rowing blue t band x 20   Wall climb x 10 each arm   Pulley x 3 min   Lev. scap 5 x 10 sec each   pec minor 5 x 10 sec each   Posture re-ed        Alessia received the following manual therapy techniques:  X 25 mins   Manual : supine : suboccipital inhibition; aggressive stretching of cervical mm - forward flexion of neck to end range with hold; extension off end of table with end range hold; rotation with flexion/SB to each side with contract relax (MET) to facilitate inhibition of tension;   Had patient sit at edge of mat, using pulsating theragun, worked tissues along bilateral upper traps, rhomboids, PT standing in front of patient - MET with patient providing light resistance - thoracic extension - 15 sec hold x 4 reps.     Palpation Assessment to determine the necessity for Functional Dry Needling  Tenderness/tightness/migraine headaches - bilateral posterior cervical musculature; left/right upper trap; left/right levator; middle scalene, posterior scalene on  left , left SCM, left temporal area. . .     Dry Needling:   Dry Needling  for 20  minutes.   Patient supine with knee bolster; LF-ES per protocol; 2-point SCM protocol and 1 point into anterior scalene. 1 point middle scalene;  - 1TB above clavicle through SCM clavicular head; left TMJ area x 3 needles, corner of left eye, 2 points on inferior mandible; Needles left in-situ x 20 mins;   Call bell placed in reach of patient .      Moist heat to c-spine x 20 mins     Home Exercises and Patient Education Provided     Education provided:   - continue with HEP; monitor symptoms follow DN     Written Handout on response to FDN provided: yes    Alessia demonstrated good  understanding of the education provided.   .     Assessment   ,Alessia is demonstrating increased multi-system problems, increased multi-joint pain; Does continue to report that the PT for her neck is definitely helpful and pain is reduced following treatment; today she is demonstrating retention of fluid, hands/feet swollen, feeling bloated, noting decreased ability to urinate and have BM; she is scheduled for a colonoscopy on Tuesday;     Goals:  Short Term Goals:                1) Decrease max pain to < 6/10 Progressing              2) Decrease tenderness/excess muscle tension to minimal/moderate Progressing              3) Facilitate improved posture Progressing              4) Facilitate improved upper quadrant flexibility Progressing     Long Term Goals:                1) Patient will consistently drive and look over her shoulder without having to turn trunk excessively Progressing              2) Patient will consistently work on puzzle (look down) without increased pain Ongoing              3) Patient will consistently reach into overhead cabinet (to place or retrieve dish) without increased pain Ongoing              4) Patient will sleep > 6 hours per night without interruption by neck pain 5 of 7 days per week Ongoing              5) Improve  functional deficit to < 45% as indicated by FOTO neck survey Minimal progress              6) Patient will be independent in HEP.   Progressing     Plan     Continue with Physical Therapy 1-2x/week; Dry Needling, Manual Treatment, TherEx and TherActivities.

## 2022-03-03 NOTE — TELEPHONE ENCOUNTER
----- Message from Melina Pickens sent at 3/3/2022  9:01 AM CST -----  .Type:  RX Refill Request    Who Called: PT     Refill or New Rx REFILL     RX Name and Strength: oxyCODONE-acetaminophen (PERCOCET)  mg per tablet    Preferred Pharmacy with phone number: [facility] [phone]     Pt Call Back Number:[phone]    Additional Information: PT IS OUT OF MEDICATION SHE'S ASKING FOR IT TO BE FILLED TODAY PLEASE AND SHE WILL NEED A P.A FOR THIS PRESCRIPTION     Thank You

## 2022-03-03 NOTE — TELEPHONE ENCOUNTER
Spoke with pharmacy they have RX from 02/23 but it need a prior Auth. I asked her to fax to [facility] so we can complete PA. Katherine, please keep a look out for this and complete PA so pt can get her medication. Thank you.

## 2022-03-04 ENCOUNTER — HOSPITAL ENCOUNTER (OUTPATIENT)
Dept: RADIOLOGY | Facility: HOSPITAL | Age: 67
Discharge: HOME | End: 2022-03-04
Attending: STUDENT IN AN ORGANIZED HEALTH CARE EDUCATION/TRAINING PROGRAM
Payer: MEDICARE

## 2022-03-04 ENCOUNTER — TELEPHONE (OUTPATIENT)
Dept: ENDOSCOPY | Facility: HOSPITAL | Age: 67
End: 2022-03-04
Payer: MEDICARE

## 2022-03-04 DIAGNOSIS — R93.89 ABNORMAL RADIOLOGICAL FINDINGS IN SKIN AND SUBCUTANEOUS TISSUE: ICD-10-CM

## 2022-03-04 PROCEDURE — 70544 MRA BRAIN WITHOUT CONTRAST: ICD-10-PCS | Mod: 26,,, | Performed by: RADIOLOGY

## 2022-03-04 PROCEDURE — 70544 MR ANGIOGRAPHY HEAD W/O DYE: CPT | Mod: TC

## 2022-03-04 PROCEDURE — 70544 MR ANGIOGRAPHY HEAD W/O DYE: CPT | Mod: 26,,, | Performed by: RADIOLOGY

## 2022-03-04 NOTE — TELEPHONE ENCOUNTER
Received a message from pt stating she cannot open the instructions attachments I emailed to her yesterday.  I telephoned her back and spoke with her.  She received the email and is trying to open the PDFs on her Ipad, but says they will not open.  Resent attachments as word documents.  Sent a physical copy of the instructions to the patient's address on file.  Also, verbally reviewed all instructions with pt again as she notes and expressed understanding.  I provided her my direct number if any questions should arise.

## 2022-03-08 ENCOUNTER — ANESTHESIA (OUTPATIENT)
Dept: ENDOSCOPY | Facility: HOSPITAL | Age: 67
End: 2022-03-08
Payer: MEDICARE

## 2022-03-08 ENCOUNTER — HOSPITAL ENCOUNTER (OUTPATIENT)
Facility: HOSPITAL | Age: 67
Discharge: HOME | End: 2022-03-08
Attending: INTERNAL MEDICINE | Admitting: INTERNAL MEDICINE
Payer: MEDICARE

## 2022-03-08 ENCOUNTER — ANESTHESIA EVENT (OUTPATIENT)
Dept: ENDOSCOPY | Facility: HOSPITAL | Age: 67
End: 2022-03-08
Payer: MEDICARE

## 2022-03-08 VITALS
HEIGHT: 67 IN | OXYGEN SATURATION: 96 % | TEMPERATURE: 98 F | RESPIRATION RATE: 18 BRPM | DIASTOLIC BLOOD PRESSURE: 69 MMHG | HEART RATE: 64 BPM | SYSTOLIC BLOOD PRESSURE: 154 MMHG | WEIGHT: 185 LBS | BODY MASS INDEX: 29.03 KG/M2

## 2022-03-08 DIAGNOSIS — K63.89 COLONIC MASS: ICD-10-CM

## 2022-03-08 PROCEDURE — 27201089 HC SNARE, DISP (ANY): Performed by: INTERNAL MEDICINE

## 2022-03-08 PROCEDURE — 88305 TISSUE EXAM BY PATHOLOGIST: CPT | Performed by: PATHOLOGY

## 2022-03-08 PROCEDURE — 45385 COLONOSCOPY W/LESION REMOVAL: CPT | Mod: ,,, | Performed by: INTERNAL MEDICINE

## 2022-03-08 PROCEDURE — D9220A PRA ANESTHESIA: Mod: ANES,,, | Performed by: ANESTHESIOLOGY

## 2022-03-08 PROCEDURE — D9220A PRA ANESTHESIA: Mod: CRNA,,, | Performed by: NURSE ANESTHETIST, CERTIFIED REGISTERED

## 2022-03-08 PROCEDURE — 45391 COLONOSCOPY W/ENDOSCOPE US: CPT | Performed by: INTERNAL MEDICINE

## 2022-03-08 PROCEDURE — 37000008 HC ANESTHESIA 1ST 15 MINUTES: Performed by: INTERNAL MEDICINE

## 2022-03-08 PROCEDURE — 25000003 PHARM REV CODE 250: Performed by: NURSE ANESTHETIST, CERTIFIED REGISTERED

## 2022-03-08 PROCEDURE — 45391 PR COLONOSCOPY W/ENDOSCOPE US: ICD-10-PCS | Mod: 51,,, | Performed by: INTERNAL MEDICINE

## 2022-03-08 PROCEDURE — 88305 TISSUE EXAM BY PATHOLOGIST: ICD-10-PCS | Mod: 26,,, | Performed by: PATHOLOGY

## 2022-03-08 PROCEDURE — 37000009 HC ANESTHESIA EA ADD 15 MINS: Performed by: INTERNAL MEDICINE

## 2022-03-08 PROCEDURE — D9220A PRA ANESTHESIA: ICD-10-PCS | Mod: CRNA,,, | Performed by: NURSE ANESTHETIST, CERTIFIED REGISTERED

## 2022-03-08 PROCEDURE — 88305 TISSUE EXAM BY PATHOLOGIST: CPT | Mod: 26,,, | Performed by: PATHOLOGY

## 2022-03-08 PROCEDURE — D9220A PRA ANESTHESIA: ICD-10-PCS | Mod: ANES,,, | Performed by: ANESTHESIOLOGY

## 2022-03-08 PROCEDURE — 45391 COLONOSCOPY W/ENDOSCOPE US: CPT | Mod: 51,,, | Performed by: INTERNAL MEDICINE

## 2022-03-08 PROCEDURE — 63600175 PHARM REV CODE 636 W HCPCS: Performed by: NURSE ANESTHETIST, CERTIFIED REGISTERED

## 2022-03-08 PROCEDURE — 45385 PR COLONOSCOPY,REMV LESN,SNARE: ICD-10-PCS | Mod: ,,, | Performed by: INTERNAL MEDICINE

## 2022-03-08 PROCEDURE — 45385 COLONOSCOPY W/LESION REMOVAL: CPT | Mod: 59 | Performed by: INTERNAL MEDICINE

## 2022-03-08 RX ORDER — SODIUM CHLORIDE 9 MG/ML
INJECTION, SOLUTION INTRAVENOUS CONTINUOUS
Status: DISCONTINUED | OUTPATIENT
Start: 2022-03-08 | End: 2022-03-08 | Stop reason: HOSPADM

## 2022-03-08 RX ORDER — PROPOFOL 10 MG/ML
VIAL (ML) INTRAVENOUS CONTINUOUS PRN
Status: DISCONTINUED | OUTPATIENT
Start: 2022-03-08 | End: 2022-03-08

## 2022-03-08 RX ORDER — KETAMINE HCL IN 0.9 % NACL 50 MG/5 ML
SYRINGE (ML) INTRAVENOUS
Status: DISCONTINUED | OUTPATIENT
Start: 2022-03-08 | End: 2022-03-08

## 2022-03-08 RX ORDER — SODIUM CHLORIDE 0.9 % (FLUSH) 0.9 %
10 SYRINGE (ML) INJECTION
Status: DISCONTINUED | OUTPATIENT
Start: 2022-03-08 | End: 2022-03-08 | Stop reason: HOSPADM

## 2022-03-08 RX ORDER — PROPOFOL 10 MG/ML
VIAL (ML) INTRAVENOUS
Status: DISCONTINUED | OUTPATIENT
Start: 2022-03-08 | End: 2022-03-08

## 2022-03-08 RX ADMIN — PROPOFOL 125 MCG/KG/MIN: 10 INJECTION, EMULSION INTRAVENOUS at 11:03

## 2022-03-08 RX ADMIN — Medication 20 MG: at 11:03

## 2022-03-08 RX ADMIN — PROPOFOL 50 MG: 10 INJECTION, EMULSION INTRAVENOUS at 11:03

## 2022-03-08 RX ADMIN — PROPOFOL 10 MG: 10 INJECTION, EMULSION INTRAVENOUS at 11:03

## 2022-03-08 RX ADMIN — SODIUM CHLORIDE: 0.9 INJECTION, SOLUTION INTRAVENOUS at 11:03

## 2022-03-08 NOTE — ANESTHESIA PREPROCEDURE EVALUATION
[facility]  Anesthesia Pre-Operative Evaluation         Patient Name: Alessia Nelson  YOB: 1955  MRN: [id number]    SUBJECTIVE:     Pre-operative evaluation for Procedure(s) (LRB):  ULTRASOUND, LOWER GI TRACT, ENDOSCOPIC (N/A)  COLONOSCOPY (N/A)     03/08/2022    Alessia Nleson is a 66 y.o. female w/ a significant PMHx of COPD, HTN, CHF, renal disease. Colonic mass.     Patient now presents for the above procedure(s).    LDA:       Peripheral IV - Single Lumen 03/08/22 1041 20 G Right Antecubital (Active)   Site Assessment Clean;Dry;Intact;No redness;No swelling;No drainage;No warmth 03/08/22 1040   Extremity Assessment Distal to IV No abnormal discoloration;No redness;No swelling;No warmth 03/08/22 1040   Line Status Infusing 03/08/22 1040   Dressing Status Clean;Dry;Intact 03/08/22 1040   Number of days: 0       Prev airway: None documented.    Drips: None documented.   sodium chloride 0.9%         Patient Active Problem List   Diagnosis    Acute renal failure    Metabolic acidosis    Sepsis with metabolic encephalopathy    CHF, chronic    HTN (hypertension), onset in her 20s    COPD (chronic obstructive pulmonary disease)    Nicotine dependence    Diarrhea, chronic    Hypernatremia    Leukocytosis, unspecified    Allergic rhinitis    Anxiety    Mild episode of recurrent major depressive disorder    Fibromyalgia    GERD (gastroesophageal reflux disease)    Hypothyroidism    Hyperlipidemia    History of malignant neoplasm of skin    MEN (multiple endocrine neoplasia)    Peripheral neuropathy    Poikiloderma    Pseudophakia    Rotator cuff injury    Tendinosis    Metatarsalgia    Dry eyes    Class 1 obesity due to excess calories with serious comorbidity and body mass index (BMI) of 30.0 to 30.9 in adult, today 29.1    Chronic bilateral low back pain without sciatica    Acute pancreatitis    Contusion of tail of pancreas    Bile duct abnormality  "   Gastroesophagitis    Lumbosacral spondylosis    S/P drug eluting coronary stent placement, 8/2017    Family history of premature CAD    Syncope and collapse, onset 2015, about 10 times, one episode noted hypotension at doctor office 9/2017    Multiple falls, started 9/2018, 5 times usually on standing    Excessive caffeine intake    Orthostatic hypotension    Abdominal obesity    Aspirin intolerance    Dyslipidemia    LVH (left ventricular hypertrophy) due to hypertensive disease, with heart failure    Abdominal discomfort in right upper quadrant    Chronic diarrhea    History of cholecystectomy    Diverticulosis of large intestine without hemorrhage    Abnormal computerized tomography of biliary tract    History of pancreatitis    Abdominal pain    Nausea    Primary osteoarthritis of right knee    Degenerative tear of medial meniscus of right knee    Baker's cyst of knee, right    Pyelonephritis    Lower urinary tract symptoms (LUTS)    Chronic pain of right knee    Unilateral primary osteoarthritis, right knee    CAD (coronary artery disease)    History of TIA (transient ischemic attack)    Status post right knee replacement    Acute kidney injury    Anemia    Enterocolitis    Hypomagnesemia    C. difficile colitis    Bradycardia    Chronic pancreatitis    Colon cancer screening    B12 deficiency    History of Clostridium difficile infection    Abnormal finding on GI tract imaging    TIA (transient ischemic attack)    Pancreatic insufficiency    Degenerative disc disease, lumbar    Current smoker    Failed back syndrome of lumbar spine    Chronic cervical pain    Activity intolerance    Sacroiliac joint dysfunction       Review of patient's allergies indicates:   Allergen Reactions    Aspirin Other (See Comments)     "Makes stomach feel like it's on fire"    Phenergan [promethazine] Other (See Comments)     SEIZURES    Lortab [hydrocodone-acetaminophen] " Itching     Able to take generic Norco       Current Inpatient Medications:      Current Facility-Administered Medications on File Prior to Encounter   Medication Dose Route Frequency Provider Last Rate Last Admin    electrolyte-S (ISOLYTE)   Intravenous Continuous Paul Nunez MD   New Bag at 06/18/21 0804    fentaNYL 50 mcg/mL injection 25 mcg  25 mcg Intravenous Q5 Min PRN Paul Nunez MD        HYDROmorphone injection 0.2 mg  0.2 mg Intravenous Q5 Min PRN Paul Nunez MD        lactated ringers infusion   Intravenous Continuous Socorro Lorenz MD        lactated ringers infusion  10 mL/hr Intravenous Continuous Paul Nunez MD 10 mL/hr at 06/07/21 1133 Restarted at 06/07/21 1301    lactated ringers infusion  500 mL Intravenous Once Paul Nunez MD        LIDOcaine (PF) 10 mg/ml (1%) injection 10 mg  1 mL Intradermal Once Paul Nunez MD        lorazepam injection 0.25 mg  0.25 mg Intravenous Once PRN Paul Nunez MD        oxyCODONE immediate release tablet 5 mg  5 mg Oral Once PRN Paul Nunez MD        prochlorperazine injection Soln 5 mg  5 mg Intravenous Q30 Min PRN Paul Nunez MD        sodium chloride 0.9% flush 3 mL  3 mL Intravenous Q8H Paul Nunez MD         Current Outpatient Medications on File Prior to Encounter   Medication Sig Dispense Refill    acetaminophen (TYLENOL) 325 MG tablet Take 2 tablets (650 mg total) by mouth every 6 (six) hours as needed for Pain (Do not take with any other tylenol containing products).  0    allopurinoL (ZYLOPRIM) 300 MG tablet TAKE 1 TABLET EVERY DAY 90 tablet 3    buPROPion (WELLBUTRIN XL) 150 MG TB24 tablet TAKE 1 TABLET EVERY DAY 90 tablet 1    colestipoL (COLESTID) 1 gram Tab TAKE 2 TABLETS ONE TIME DAILY 180 tablet 3    cyanocobalamin 1,000 mcg/mL injection Inject 1 mL (1,000 mcg total) into the muscle every 14 (fourteen) days. 10 mL 3    magnesium oxide (MAG-OX)  400 mg (241.3 mg magnesium) tablet Take 1 tablet (400 mg total) by mouth once daily.      oxyCODONE-acetaminophen (PERCOCET)  mg per tablet Take 1 tablet by mouth every 8 (eight) hours as needed for Pain. 90 tablet 0    pantoprazole (PROTONIX) 40 MG tablet TAKE 1 TABLET EVERY DAY 90 tablet 3    traZODone (DESYREL) 100 MG tablet TAKE 1 TABLET EVERY EVENING 90 tablet 3    clopidogreL (PLAVIX) 75 mg tablet TAKE 1 TABLET EVERY DAY 90 tablet 3    TENS unit and electrodes Cmpk 1 Units by Misc.(Non-Drug; Combo Route) route daily as needed. 1 each 0       Past Surgical History:   Procedure Laterality Date    ADRENAL GLAND SURGERY      APPENDECTOMY      BACK SURGERY      CAUDAL EPIDURAL STEROID INJECTION N/A 3/1/2021    Procedure: Injection-steroid-epidural-caudal;  Surgeon: Socorro Lorenz MD;  Location: [facility] OR;  Service: Pain Management;  Laterality: N/A;    CHOLECYSTECTOMY      COLONOSCOPY      COLONOSCOPY N/A 6/10/2021    Procedure: COLONOSCOPY;  Surgeon: Sheree Metz MD;  Location: [facility] ENDO;  Service: Endoscopy;  Laterality: N/A;    CORONARY STENT PLACEMENT      CYSTOURETHROSCOPY N/A 11/13/2019    Procedure: CYSTOURETHROSCOPY;  Surgeon: Shun Nuñez MD;  Location[facility] OR;  Service: Urology;  Laterality: N/A;    ENDOSCOPIC ULTRASOUND OF UPPER GASTROINTESTINAL TRACT N/A 11/25/2019    Procedure: ULTRASOUND, UPPER GI TRACT, ENDOSCOPIC;  Surgeon: Alhaji Bridges MD;  Location: [facility] ENDO (2ND FLR);  Service: Endoscopy;  Laterality: N/A;  5 day hold Plavix, Dr Jovanni Serrano - pg  PM prep    ENDOSCOPIC ULTRASOUND OF UPPER GASTROINTESTINAL TRACT N/A 11/2/2020    Procedure: ULTRASOUND, UPPER GI TRACT, ENDOSCOPIC;  Surgeon: Maurilio Rodriguez MD;  Location: [facility] ENDO (2ND FLR);  Service: Endoscopy;  Laterality: N/A;  5 day hold GabriellevixDr Roman - pg  Covid-19 test 10/30/20 at [facility]    EPIDURAL STEROID INJECTION INTO CERVICAL SPINE N/A 12/8/2021    Procedure:  Injection-steroid-epidural-cervical;  Surgeon: Socorro Lorenz MD;  Location: [facility] OR;  Service: Pain Management;  Laterality: N/A;    ESOPHAGOGASTRODUODENOSCOPY N/A 6/10/2021    Procedure: EGD (ESOPHAGOGASTRODUODENOSCOPY);  Surgeon: Sheree Metz MD;  Location: [facility];  Service: Endoscopy;  Laterality: N/A;    HERNIA REPAIR      HYSTERECTOMY      INCISIONAL HERNIA REPAIR      INJECTION OF ANESTHETIC AGENT AROUND NERVE Right 5/27/2019    Procedure: RIGHT L5-S3 MEDIAL BRANCH BLOCKS;  Surgeon: Sneha Aragon MD;  Location: [facility] OR;  Service: Pain Management;  Laterality: Right;  **DO NOT STOP PLAVIX**    INJECTION OF JOINT Right 9/22/2021    Procedure: Injection, Joint; Right SI Joint Injection;  Surgeon: Socorro Lorenz MD;  Location: [facility] OR;  Service: Pain Management;  Laterality: Right;  Oral    INSERTION OF DORSAL COLUMN NERVE STIMULATOR FOR TRIAL N/A 6/7/2021    Procedure: INSERTION, NEUROSTIMULATOR, SPINAL CORD, DORSAL COLUMN, FOR TRIAL;  Surgeon: Socorro Lorenz MD;  Location: [facility] OR;  Service: Pain Management;  Laterality: N/A;  nevro rep confirmed start time    instestine      bowel section    KNEE ARTHROPLASTY Right 12/18/2019    Procedure: ARTHROPLASTY, KNEE;  Surgeon: Ike Briceño II, MD;  Location: [facility];  Service: Orthopedics;  Laterality: Right;    KNEE SURGERY  1983    OOPHORECTOMY      PARATHYROID GLAND SURGERY      3 surgeries    RADIOFREQUENCY ABLATION Right 6/10/2019    Procedure: Radiofrequency Ablation - RIGHT L3-5 RADIOFREQUENCY ABLATION WITH HALYARD COOLIEF THERMAL SYSTEM;  Surgeon: Sneha Aragon MD;  Location: [facility];  Service: Pain Management;  Laterality: Right;  **HOLD PLAVIX x 7 DAYS PRIOR**    UPPER GASTROINTESTINAL ENDOSCOPY         Social History     Socioeconomic History    Marital status: Single    Number of children: 0   Tobacco Use    Smoking status: Current Every Day Smoker     Packs/day: 1.00     Years: 46.00     Pack years: 46.00      Types: Cigarettes    Smokeless tobacco: Never Used   Substance and Sexual Activity    Alcohol use: Not Currently    Drug use: No    Sexual activity: Not Currently       OBJECTIVE:     Vital Signs Range (Last 24H):  Temp:  [36.6 °C (97.9 °F)]   Pulse:  [76]   Resp:  [16]   BP: (137)/(63)   SpO2:  [96 %]       Significant Labs:  Lab Results   Component Value Date    WBC 10.07 12/19/2021    HGB 11.0 (L) 12/19/2021    HCT 35.7 (L) 12/19/2021     12/19/2021    CHOL 196 10/15/2020    TRIG 221 (H) 10/15/2020    HDL 45 10/15/2020    ALT 12 12/19/2021    AST 10 12/19/2021     12/19/2021    K 4.3 12/19/2021     12/19/2021    CREATININE 0.8 12/19/2021    BUN 15 12/19/2021    CO2 25 12/19/2021    TSH 2.932 12/19/2021       Diagnostic Studies: No relevant studies.    EKG:   Results for orders placed or performed during the hospital encounter of 06/18/21   EKG 12-lead    Collection Time: 06/18/21  5:59 AM    Narrative    Test Reason : Z01.818,    Vent. Rate : 054 BPM     Atrial Rate : 054 BPM     P-R Int : 166 ms          QRS Dur : 094 ms      QT Int : 474 ms       P-R-T Axes : 059 -02 042 degrees     QTc Int : 449 ms    Sinus bradycardia with Premature atrial complexes  Otherwise normal ECG  When compared with ECG of 23-SEP-2020 17:17,  Premature atrial complexes are now Present  Confirmed by Nina VERGARA, Jovanni REDD ([id number]) on 6/23/2021 3:11:13 PM    Referred By: [email or web address]   SELF           Confirmed By:Jovanni Wood MD       2D ECHO:  TTE:  Results for orders placed or performed in visit on 09/23/20   Echo Color Flow Doppler? Yes; Bubble Contrast? No   Result Value Ref Range    BSA 1.9 m2    TDI SEPTAL 6.91 m/s    LV LATERAL E/E' RATIO 0.10 m/s    LV SEPTAL E/E' RATIO 0.13 m/s    AORTIC VALVE CUSP SEPERATION 2 cm    TDI LATERAL 9.10 m/s    LVIDd 4.15 3.5 - 6.0 cm    IVS 1.02 0.6 - 1.1 cm    Posterior Wall 1.04 0.6 - 1.1 cm    LVIDs 2.73 2.1 - 4.0 cm    FS 34 28 - 44 %    LV mass 140.38 g    LA size  4.10 cm    RVDD 3.28 cm    Left Ventricle Relative Wall Thickness 0.50 cm    AV mean gradient 4 mmHg    AV valve area 2.92 cm2    AV Velocity Ratio 0.81     AV index (prosthetic) 0.93     MV valve area p 1/2 method 3.67 cm2    E/A ratio 0.85     Mean e' 8.01 m/s    E wave deceleration time 264.00 msec    IVRT 100.00 msec    LVOT diameter 2.00 cm    LVOT area 3.1 cm2    LVOT peak olu 1.3 m/s    LVOT peak VTI 31.50 cm    Ao peak olu 1.6 m/s    Ao VTI 33.90 cm    LVOT stroke volume 98.91 cm3    AV peak gradient 10 mmHg    E/E' ratio 0.11 m/s    MV Peak E Olu 0.88 m/s    TR Max Olu 1.86 m/s    MV stenosis pressure 1/2 time 60 ms    MV Peak A Olu 1.04 m/s    LV Systolic Volume 27.80 mL    LV Systolic Volume Index 14.8 mL/m2    LV Diastolic Volume 76.40 mL    LV Diastolic Volume Index 40.69 mL/m2    LV Mass Index 75 g/m2    Triscuspid Valve Regurgitation Peak Gradient 14 mmHg    Right Atrial Pressure (from IVC) 3 mmHg    TV rest pulmonary artery pressure 17 mmHg    Narrative    · There is left ventricular concentric remodeling.  · The left ventricle is normal in size with normal systolic function. The   estimated ejection fraction is 63%.  · Normal left ventricular diastolic function.  · Normal right ventricular systolic function.  · No pulmonary hypertension  · Normal central venous pressure (3 mmHg).  · The estimated PA systolic pressure is 17 mmHg.          ADELA:  No results found for this or any previous visit.    ASSESSMENT/PLAN:                                                                                                                  03/08/2022  Alessia Nelson is a 66 y.o., female.      Pre-op Assessment    I have reviewed the Patient Summary Reports.     I have reviewed the Nursing Notes.       Review of Systems  Anesthesia Hx:  No problems with previous Anesthesia Denies Hx of Anesthetic complications  Denies Family Hx of Anesthesia complications.   Denies Personal Hx of Anesthesia complications.    Cardiovascular:   Hypertension CAD   CHF    Pulmonary:   COPD    Renal/:   Chronic Renal Disease    Hepatic/GI:   GERD    Neurological:   TIA, CVA Neuromuscular Disease,    Endocrine:   Hypothyroidism    Psych:   Psychiatric History          Physical Exam  General: Well nourished    Airway:  Mallampati: II       Chest/Lungs:  Normal Respiratory Rate        Anesthesia Plan  Type of Anesthesia, risks & benefits discussed:    Anesthesia Type: Gen ETT, Gen Natural Airway, MAC  Intra-op Monitoring Plan: Standard ASA Monitors  Induction:  IV  Informed Consent: Informed consent signed with the Patient and all parties understand the risks and agree with anesthesia plan.  All questions answered.   ASA Score: 3    Ready For Surgery From Anesthesia Perspective.     .

## 2022-03-08 NOTE — TRANSFER OF CARE
"Anesthesia Transfer of Care Note    Patient: Alessia Nelson    Procedure(s) Performed: Procedure(s) (LRB):  ULTRASOUND, LOWER GI TRACT, ENDOSCOPIC (N/A)  COLONOSCOPY (N/A)    Patient location: [facility]    Anesthesia Type: general    Transport from OR: Transported from OR on 6-10 L/min O2 by face mask with adequate spontaneous ventilation    Post pain: adequate analgesia    Post assessment: no apparent anesthetic complications    Post vital signs: stable    Level of consciousness: awake    Nausea/Vomiting: no nausea/vomiting    Complications: none    Transfer of care protocol was followed      Last vitals:   Visit Vitals  BP (!) 154/69   Pulse 64   Temp 36.4 °C (97.5 °F) (Tympanic)   Resp 18   Ht 5' 7" (1.702 m)   Wt 83.9 kg (185 lb)   SpO2 96%   Breastfeeding No   BMI 28.98 kg/m²     "

## 2022-03-08 NOTE — ANESTHESIA POSTPROCEDURE EVALUATION
Anesthesia Post Evaluation    Patient: Alessia Nelson    Procedure(s) Performed: Procedure(s) (LRB):  ULTRASOUND, LOWER GI TRACT, ENDOSCOPIC (N/A)  COLONOSCOPY (N/A)    Final Anesthesia Type: general      Patient location during evaluation: [facility]  Patient participation: Yes- Able to Participate  Level of consciousness: awake and alert and oriented  Post-procedure vital signs: reviewed and stable  Pain management: adequate  Airway patency: patent    PONV status at discharge: No PONV  Anesthetic complications: no      Cardiovascular status: blood pressure returned to baseline and hemodynamically stable  Respiratory status: unassisted, spontaneous ventilation and room air  Hydration status: euvolemic  Follow-up not needed.          Vitals Value Taken Time   /69 03/08/22 1246   Temp 36.4 °C (97.5 °F) 03/08/22 1155   Pulse 61 03/08/22 1250   Resp 18 03/08/22 1245   SpO2 92 % 03/08/22 1250   Vitals shown include unvalidated device data.      No case tracking events are documented in the log.      Pain/Radha Score: Radha Score: 10 (3/8/2022 11:55 AM)

## 2022-03-08 NOTE — H&P
History & Physical - Short Stay  Gastroenterology      SUBJECTIVE:     Procedure: EUS    Chief Complaint/Indication for Procedure: colon mass    History of Present Illness:  Patient is a 66 y.o. female with colon mass likely lipoma coming for EUS.     PTA Medications   Medication Sig    acetaminophen (TYLENOL) 325 MG tablet Take 2 tablets (650 mg total) by mouth every 6 (six) hours as needed for Pain (Do not take with any other tylenol containing products).    allopurinoL (ZYLOPRIM) 300 MG tablet TAKE 1 TABLET EVERY DAY    baclofen (LIORESAL) 10 MG tablet Take 1 tablet (10 mg total) by mouth 3 (three) times daily as needed (myofascial pain).    buPROPion (WELLBUTRIN XL) 150 MG TB24 tablet TAKE 1 TABLET EVERY DAY    colestipoL (COLESTID) 1 gram Tab TAKE 2 TABLETS ONE TIME DAILY    cyanocobalamin 1,000 mcg/mL injection Inject 1 mL (1,000 mcg total) into the muscle every 14 (fourteen) days.    EScitalopram oxalate (LEXAPRO) 20 MG tablet Take 1 tablet (20 mg total) by mouth every evening.    gabapentin (NEURONTIN) 400 MG capsule TAKE 1 CAPSULE TWICE DAILY    hydroCHLOROthiazide (HYDRODIURIL) 12.5 MG Tab Take 1 tablet (12.5 mg total) by mouth every Mon, Wed, Fri.    levothyroxine (SYNTHROID) 75 MCG tablet Take 1 tablet (75 mcg total) by mouth once daily.    magnesium oxide (MAG-OX) 400 mg (241.3 mg magnesium) tablet Take 1 tablet (400 mg total) by mouth once daily.    metoprolol tartrate (LOPRESSOR) 100 MG tablet Take 0.5 tablets (50 mg total) by mouth Daily.    ondansetron (ZOFRAN) 4 MG tablet Take 2 tablets (8 mg total) by mouth every 8 (eight) hours as needed for Nausea.    oxyCODONE-acetaminophen (PERCOCET)  mg per tablet Take 1 tablet by mouth every 8 (eight) hours as needed for Pain.    pantoprazole (PROTONIX) 40 MG tablet TAKE 1 TABLET EVERY DAY    potassium chloride SA (K-DUR,KLOR-CON) 20 MEQ tablet TAKE 1 TABLET EVERY DAY    traZODone (DESYREL) 100 MG tablet TAKE 1 TABLET EVERY EVENING     "ZENPEP 10,000-32,000 -42,000 unit CpDR Take 1 capsule by mouth 3 (three) times daily with meals.    clopidogreL (PLAVIX) 75 mg tablet TAKE 1 TABLET EVERY DAY    oxyCODONE-acetaminophen (PERCOCET)  mg per tablet Take 1 tablet by mouth every 8 (eight) hours as needed for Pain.    oxyCODONE-acetaminophen (PERCOCET)  mg per tablet Take 1 tablet by mouth every 8 (eight) hours as needed for Pain.    sumatriptan (IMITREX) 50 MG tablet Take 1 tablet (50 mg total) by mouth every 2 (two) hours as needed for Migraine (50 to 100 mg as a single dose ([facility] [Quinn 2013]; Dilip 2012). If symptoms persist or return, may repeat dose (usually same as first dose) after =2 hours. Maximum dose: 100 mg/dose; 200 mg per 24 hours.).    TENS unit and electrodes Cmpk 1 Units by Misc.(Non-Drug; Combo Route) route daily as needed.       Review of patient's allergies indicates:   Allergen Reactions    Aspirin Other (See Comments)     "Makes stomach feel like it's on fire"    Phenergan [promethazine] Other (See Comments)     SEIZURES    Lortab [hydrocodone-acetaminophen] Itching     Able to take generic Norco        Past Medical History:   Diagnosis Date    Acute pancreatitis     Back pain     CAD (coronary artery disease)     CHF (congestive heart failure)     COPD (chronic obstructive pulmonary disease)     Depression     Diverticulosis     Encounter for blood transfusion     GERD (gastroesophageal reflux disease)     History of blood clots     1986 AFTER BOWEL RESCETION    History of bowel resection     Hyperlipidemia     Hypertension     Peritonitis     Seizures     HAD A SEIZURE FROM PHENERGAN     Stroke     Thyroid disease     TIA (transient ischemic attack)     Wears dentures     upper     Past Surgical History:   Procedure Laterality Date    ADRENAL GLAND SURGERY      APPENDECTOMY      BACK SURGERY      CAUDAL EPIDURAL STEROID INJECTION N/A 3/1/2021    Procedure: " Injection-steroid-epidural-caudal;  Surgeon: Socorro Lorenz MD;  Location: [facility] OR;  Service: Pain Management;  Laterality: N/A;    CHOLECYSTECTOMY      COLONOSCOPY      COLONOSCOPY N/A 6/10/2021    Procedure: COLONOSCOPY;  Surgeon: Sheree Metz MD;  Location: [facility];  Service: Endoscopy;  Laterality: N/A;    CORONARY STENT PLACEMENT      CYSTOURETHROSCOPY N/A 11/13/2019    Procedure: CYSTOURETHROSCOPY;  Surgeon: Shun Nuñez MD;  Location: [facility] OR;  Service: Urology;  Laterality: N/A;    ENDOSCOPIC ULTRASOUND OF UPPER GASTROINTESTINAL TRACT N/A 11/25/2019    Procedure: ULTRASOUND, UPPER GI TRACT, ENDOSCOPIC;  Surgeon: Alhaji Bridges MD;  Location: [facility] (2ND FLR);  Service: Endoscopy;  Laterality: N/A;  5 day hold Plavix, Dr Jovanni Serrano - pg  PM prep    ENDOSCOPIC ULTRASOUND OF UPPER GASTROINTESTINAL TRACT N/A 11/2/2020    Procedure: ULTRASOUND, UPPER GI TRACT, ENDOSCOPIC;  Surgeon: Maurilio Rodriguez MD;  Location: [facility] (2ND FLR);  Service: Endoscopy;  Laterality: N/A;  5 day hold Plavix, Dr Roman Walsh - pg  Covid-19 test 10/30/20 at [facility], [place], operated by [facility]    EPIDURAL STEROID INJECTION INTO CERVICAL SPINE N/A 12/8/2021    Procedure: Injection-steroid-epidural-cervical;  Surgeon: Socorro Lorenz MD;  Location: [facility];  Service: Pain Management;  Laterality: N/A;    ESOPHAGOGASTRODUODENOSCOPY N/A 6/10/2021    Procedure: EGD (ESOPHAGOGASTRODUODENOSCOPY);  Surgeon: Sheree Metz MD;  Location: [facility];  Service: Endoscopy;  Laterality: N/A;    HERNIA REPAIR      HYSTERECTOMY      INCISIONAL HERNIA REPAIR      INJECTION OF ANESTHETIC AGENT AROUND NERVE Right 5/27/2019    Procedure: RIGHT L5-S3 MEDIAL BRANCH BLOCKS;  Surgeon: Sneha Aragon MD;  Location: [facility];  Service: Pain Management;  Laterality: Right;  **DO NOT STOP PLAVIX**    INJECTION OF JOINT Right 9/22/2021    Procedure: Injection, Joint; Right SI Joint Injection;  Surgeon: Socorro Lorenz MD;  Location: [facility];   Service: Pain Management;  Laterality: Right;  Oral    INSERTION OF DORSAL COLUMN NERVE STIMULATOR FOR TRIAL N/A 6/7/2021    Procedure: INSERTION, NEUROSTIMULATOR, SPINAL CORD, DORSAL COLUMN, FOR TRIAL;  Surgeon: Socorro Lorenz MD;  Location: [facility] OR;  Service: Pain Management;  Laterality: N/A;  nevro rep confirmed start time    instestine      bowel section    KNEE ARTHROPLASTY Right 12/18/2019    Procedure: ARTHROPLASTY, KNEE;  Surgeon: Ike Briceño II, MD;  Location: [facility] OR;  Service: Orthopedics;  Laterality: Right;    KNEE SURGERY  1983    OOPHORECTOMY      PARATHYROID GLAND SURGERY      3 surgeries    RADIOFREQUENCY ABLATION Right 6/10/2019    Procedure: Radiofrequency Ablation - RIGHT L3-5 RADIOFREQUENCY ABLATION WITH HALYARD COOLIEF THERMAL SYSTEM;  Surgeon: Sneha Aragon MD;  Location: [facility] OR;  Service: Pain Management;  Laterality: Right;  **HOLD PLAVIX x 7 DAYS PRIOR**    UPPER GASTROINTESTINAL ENDOSCOPY       Family History   Problem Relation Age of Onset    Stroke Maternal Grandmother     Cancer Maternal Grandfather     Stroke Mother     Heart disease Father     Breast cancer Sister      Social History     Tobacco Use    Smoking status: Current Every Day Smoker     Packs/day: 1.00     Years: 46.00     Pack years: 46.00     Types: Cigarettes    Smokeless tobacco: Never Used   Substance Use Topics    Alcohol use: Not Currently    Drug use: No          OBJECTIVE:     Vital Signs (Most Recent)  Temp: 97.9 °F (36.6 °C) (03/08/22 1035)  Pulse: 76 (03/08/22 1035)  Resp: 16 (03/08/22 1035)  BP: 137/63 (03/08/22 1035)  SpO2: 96 % (03/08/22 1035)       ASSESSMENT/PLAN:     Patient is a 66 y.o. female with colon mass likely lipoma coming for EUS.     Plan: EUS    Anesthesia Plan: Moderate Sedation    ASA Grade: ASA 2 - Patient with mild systemic disease with no functional limitations

## 2022-03-08 NOTE — PROVATION PATIENT INSTRUCTIONS
Discharge Summary/Instructions after an Endoscopic Procedure  Patient Name: Alessia Nelson  Patient MRN: [id number]  Patient YOB: 1955  Tuesday, March 8, 2022  Alhaji Bridges MD  Dear patient,  As a result of recent federal legislation (The Federal Cures Act), you may   receive lab or pathology results from your procedure in your [facility]   account before your physician is able to contact you. Your physician or   their representative will relay the results to you with their   recommendations at their soonest availability.  Thank you,  RESTRICTIONS:  During your procedure today, you received medications for sedation.  These   medications may affect your judgment, balance and coordination.  Therefore,   for 24 hours, you have the following restrictions:   - DO NOT drive a car, operate machinery, make legal/financial decisions,   sign important papers or drink alcohol.    ACTIVITY:  Today: no heavy lifting, straining or running due to procedural   sedation/anesthesia.  The following day: return to full activity including work.  DIET:  Eat and drink normally unless instructed otherwise.     TREATMENT FOR COMMON SIDE EFFECTS:  - Mild abdominal pain, nausea, belching, bloating or excessive gas:  rest,   eat lightly and use a heating pad.  - Sore Throat: treat with throat lozenges and/or gargle with warm salt   water.  - Because air was used during the procedure, expelling large amounts of air   from your rectum or belching is normal.  - If a bowel prep was taken, you may not have a bowel movement for 1-3 days.    This is normal.  SYMPTOMS TO WATCH FOR AND REPORT TO YOUR PHYSICIAN:  1. Abdominal pain or bloating, other than gas cramps.  2. Chest pain.  3. Back pain.  4. Signs of infection such as: chills or fever occurring within 24 hours   after the procedure.  5. Rectal bleeding, which would show as bright red, maroon, or black stools.   (A tablespoon of blood from the rectum is not serious,  especially if   hemorrhoids are present.)  6. Vomiting.  7. Weakness or dizziness.  GO DIRECTLY TO THE NEAREST EMERGENCY ROOM IF YOU HAVE ANY OF THE FOLLOWING:      Difficulty breathing              Chills and/or fever over 101 F   Persistent vomiting and/or vomiting blood   Severe abdominal pain   Severe chest pain   Black, tarry stools   Bleeding- more than one tablespoon   Any other symptom or condition that you feel may need urgent attention  Your doctor recommends these additional instructions:  If any biopsies were taken, your doctors clinic will contact you in 1 to 2   weeks with any results.  - Discharge patient to home.   - Resume previous diet.   - Continue present medications.   - Await path results.   - Patient has a contact number available for emergencies.  The signs and   symptoms of potential delayed complications were discussed with the   patient.  Return to normal activities tomorrow.  Written discharge   instructions were provided to the patient.  For questions, problems or results please call your physician - Alhaji Bridges MD at Work:  [phone].  [facility], EMERGENCY ROOM PHONE NUMBER: [phone]  IF A COMPLICATION OR EMERGENCY SITUATION ARISES AND YOU ARE UNABLE TO REACH   YOUR PHYSICIAN - GO DIRECTLY TO THE EMERGENCY ROOM.  Alhaji Bridges MD  3/8/2022 12:19:32 PM  This report has been verified and signed electronically.  Dear patient,  As a result of recent federal legislation (The Federal Cures Act), you may   receive lab or pathology results from your procedure in your [facility]   account before your physician is able to contact you. Your physician or   their representative will relay the results to you with their   recommendations at their soonest availability.  Thank you,  PROVATION

## 2022-03-10 ENCOUNTER — NURSE TRIAGE (OUTPATIENT)
Dept: ADMINISTRATIVE | Facility: CLINIC | Age: 67
End: 2022-03-10
Payer: MEDICARE

## 2022-03-11 ENCOUNTER — PATIENT OUTREACH (OUTPATIENT)
Dept: ADMINISTRATIVE | Facility: OTHER | Age: 67
End: 2022-03-11
Payer: MEDICARE

## 2022-03-11 NOTE — TELEPHONE ENCOUNTER
Pt with complaints of abd pain and fluid in her feet and legs.  Care advice states for the pt to go to the ED now, pt verbally understands care advice.  Advised to call back for any worsening symptoms.    Reason for Disposition   [1] SEVERE pain (e.g., excruciating) AND [2] present > 1 hour    Additional Information   Negative: SEVERE difficulty breathing (e.g., struggling for each breath, speaks in single words)   Negative: Shock suspected (e.g., cold/pale/clammy skin, too weak to stand, low BP, rapid pulse)   Negative: Difficult to awaken or acting confused (e.g., disoriented, slurred speech)   Negative: Passed out (i.e., lost consciousness, collapsed and was not responding)   Negative: Visible sweat on face or sweat dripping down face   Negative: Sounds like a life-threatening emergency to the triager   Negative: Followed an abdomen (stomach) injury   Negative: Chest pain    Protocols used: ABDOMINAL PAIN - UPPER-A-AH

## 2022-03-14 ENCOUNTER — HOSPITAL ENCOUNTER (EMERGENCY)
Facility: HOSPITAL | Age: 67
Discharge: HOME | End: 2022-03-14
Attending: EMERGENCY MEDICINE
Payer: MEDICARE

## 2022-03-14 VITALS
HEART RATE: 60 BPM | HEIGHT: 67 IN | OXYGEN SATURATION: 97 % | DIASTOLIC BLOOD PRESSURE: 67 MMHG | TEMPERATURE: 98 F | RESPIRATION RATE: 18 BRPM | BODY MASS INDEX: 29.03 KG/M2 | WEIGHT: 185 LBS | SYSTOLIC BLOOD PRESSURE: 149 MMHG

## 2022-03-14 DIAGNOSIS — R60.0 BILATERAL LEG EDEMA: ICD-10-CM

## 2022-03-14 DIAGNOSIS — R34 URINATION DECREASE: ICD-10-CM

## 2022-03-14 DIAGNOSIS — M43.07 LUMBOSACRAL SPONDYLOLYSIS: ICD-10-CM

## 2022-03-14 DIAGNOSIS — G89.4 CHRONIC PAIN DISORDER: ICD-10-CM

## 2022-03-14 DIAGNOSIS — M51.36 DDD (DEGENERATIVE DISC DISEASE), LUMBAR: ICD-10-CM

## 2022-03-14 DIAGNOSIS — M96.1 POSTLAMINECTOMY SYNDROME OF LUMBAR REGION: Primary | ICD-10-CM

## 2022-03-14 DIAGNOSIS — R19.4 BOWEL HABIT CHANGES: Primary | ICD-10-CM

## 2022-03-14 LAB
ALBUMIN SERPL BCP-MCNC: 3.5 G/DL (ref 3.5–5.2)
ALP SERPL-CCNC: 91 U/L (ref 55–135)
ALT SERPL W/O P-5'-P-CCNC: 5 U/L (ref 10–44)
ANION GAP SERPL CALC-SCNC: 12 MMOL/L (ref 8–16)
AST SERPL-CCNC: 10 U/L (ref 10–40)
BASOPHILS # BLD AUTO: 0.07 K/UL (ref 0–0.2)
BASOPHILS NFR BLD: 0.7 % (ref 0–1.9)
BILIRUB SERPL-MCNC: 0.4 MG/DL (ref 0.1–1)
BILIRUB UR QL STRIP: NEGATIVE
BNP SERPL-MCNC: 148 PG/ML (ref 0–99)
BUN SERPL-MCNC: 18 MG/DL (ref 8–23)
CALCIUM SERPL-MCNC: 10.5 MG/DL (ref 8.7–10.5)
CHLORIDE SERPL-SCNC: 101 MMOL/L (ref 95–110)
CLARITY UR REFRACT.AUTO: CLEAR
CO2 SERPL-SCNC: 27 MMOL/L (ref 23–29)
COLOR UR AUTO: YELLOW
CREAT SERPL-MCNC: 1.1 MG/DL (ref 0.5–1.4)
DIFFERENTIAL METHOD: ABNORMAL
EOSINOPHIL # BLD AUTO: 0.2 K/UL (ref 0–0.5)
EOSINOPHIL NFR BLD: 2.1 % (ref 0–8)
ERYTHROCYTE [DISTWIDTH] IN BLOOD BY AUTOMATED COUNT: 14.2 % (ref 11.5–14.5)
EST. GFR  (AFRICAN AMERICAN): >60 ML/MIN/1.73 M^2
EST. GFR  (NON AFRICAN AMERICAN): 52.8 ML/MIN/1.73 M^2
FINAL PATHOLOGIC DIAGNOSIS: NORMAL
GLUCOSE SERPL-MCNC: 99 MG/DL (ref 70–110)
GLUCOSE UR QL STRIP: NEGATIVE
GROSS: NORMAL
HCT VFR BLD AUTO: 36.5 % (ref 37–48.5)
HGB BLD-MCNC: 11.4 G/DL (ref 12–16)
HGB UR QL STRIP: NEGATIVE
IMM GRANULOCYTES # BLD AUTO: 0.04 K/UL (ref 0–0.04)
IMM GRANULOCYTES NFR BLD AUTO: 0.4 % (ref 0–0.5)
KETONES UR QL STRIP: ABNORMAL
LEUKOCYTE ESTERASE UR QL STRIP: NEGATIVE
LYMPHOCYTES # BLD AUTO: 2.7 K/UL (ref 1–4.8)
LYMPHOCYTES NFR BLD: 29 % (ref 18–48)
Lab: NORMAL
MCH RBC QN AUTO: 30.9 PG (ref 27–31)
MCHC RBC AUTO-ENTMCNC: 31.2 G/DL (ref 32–36)
MCV RBC AUTO: 99 FL (ref 82–98)
MONOCYTES # BLD AUTO: 0.9 K/UL (ref 0.3–1)
MONOCYTES NFR BLD: 9.1 % (ref 4–15)
NEUTROPHILS # BLD AUTO: 5.5 K/UL (ref 1.8–7.7)
NEUTROPHILS NFR BLD: 58.7 % (ref 38–73)
NITRITE UR QL STRIP: NEGATIVE
NRBC BLD-RTO: 0 /100 WBC
PH UR STRIP: 5 [PH] (ref 5–8)
PLATELET # BLD AUTO: 234 K/UL (ref 150–450)
PMV BLD AUTO: 9.5 FL (ref 9.2–12.9)
POTASSIUM SERPL-SCNC: 4.4 MMOL/L (ref 3.5–5.1)
PROT SERPL-MCNC: 6.7 G/DL (ref 6–8.4)
PROT UR QL STRIP: NEGATIVE
RBC # BLD AUTO: 3.69 M/UL (ref 4–5.4)
SODIUM SERPL-SCNC: 140 MMOL/L (ref 136–145)
SP GR UR STRIP: 1.02 (ref 1–1.03)
URN SPEC COLLECT METH UR: ABNORMAL
WBC # BLD AUTO: 9.4 K/UL (ref 3.9–12.7)

## 2022-03-14 PROCEDURE — 99285 PR EMERGENCY DEPT VISIT,LEVEL V: ICD-10-PCS | Mod: ,,, | Performed by: PHYSICIAN ASSISTANT

## 2022-03-14 PROCEDURE — 86803 HEPATITIS C AB TEST: CPT | Performed by: EMERGENCY MEDICINE

## 2022-03-14 PROCEDURE — 85025 COMPLETE CBC W/AUTO DIFF WBC: CPT | Performed by: PHYSICIAN ASSISTANT

## 2022-03-14 PROCEDURE — 80053 COMPREHEN METABOLIC PANEL: CPT | Performed by: PHYSICIAN ASSISTANT

## 2022-03-14 PROCEDURE — 99285 EMERGENCY DEPT VISIT HI MDM: CPT | Mod: ,,, | Performed by: PHYSICIAN ASSISTANT

## 2022-03-14 PROCEDURE — 99284 EMERGENCY DEPT VISIT MOD MDM: CPT | Mod: 25

## 2022-03-14 PROCEDURE — 81003 URINALYSIS AUTO W/O SCOPE: CPT | Performed by: PHYSICIAN ASSISTANT

## 2022-03-14 PROCEDURE — 36000 PLACE NEEDLE IN VEIN: CPT

## 2022-03-14 PROCEDURE — 83880 ASSAY OF NATRIURETIC PEPTIDE: CPT | Performed by: PHYSICIAN ASSISTANT

## 2022-03-14 RX ORDER — GABAPENTIN 400 MG/1
400 CAPSULE ORAL 2 TIMES DAILY
Qty: 180 CAPSULE | Refills: 2 | Status: SHIPPED | OUTPATIENT
Start: 2022-03-14 | End: 2022-05-11 | Stop reason: SDUPTHER

## 2022-03-14 RX ORDER — GABAPENTIN 400 MG/1
400 CAPSULE ORAL 2 TIMES DAILY
Qty: 20 CAPSULE | Refills: 0 | Status: SHIPPED | OUTPATIENT
Start: 2022-03-14 | End: 2022-03-17 | Stop reason: SDUPTHER

## 2022-03-14 RX ORDER — GABAPENTIN 400 MG/1
400 CAPSULE ORAL 2 TIMES DAILY
Qty: 180 CAPSULE | Refills: 1 | Status: SHIPPED | OUTPATIENT
Start: 2022-03-14 | End: 2022-03-14

## 2022-03-14 NOTE — TELEPHONE ENCOUNTER
Pt is requesting refil on     Synthroid 75mcg   Last refill 12/30/21    Lexapro 20mg  Last refill 12/30/21    Patient would like a 1-2 weeks supply of synthroid [facility]

## 2022-03-14 NOTE — TELEPHONE ENCOUNTER
----- Message from Stella Koenig sent at 3/14/2022 11:46 AM CDT -----  Contact: pt  Who Called: PT  Regarding: The pt is requesting to have a 90 day supply called in for her listed medications called into [facility]. She also states that she needs to have a 10 day supply called into the [facility]'s pharmacy of her levothyroxine (SYNTHROID) 75 MCG tablet due to her almost being out. Please contact the pt for additional information.   Would the patient rather a call back or a response via [facility]? Call back  Best Call Back Number: [phone]  Additional Information: levothyroxine (SYNTHROID) 75 MCG tablet and EScitalopram oxalate (LEXAPRO) 20 MG tablet

## 2022-03-14 NOTE — TELEPHONE ENCOUNTER
Spoke with pt she needs 10 day supply to [facility] and then the rest [facility] can you please order. It was wrong pharmacy.

## 2022-03-14 NOTE — TELEPHONE ENCOUNTER
----- Message from Jaimie Archer, Patient Care Assistant sent at 3/14/2022  9:50 AM CDT -----  Regarding: refill  Contact: pt  Type:  RX Refill Request    Who Called:  pt   Refill or New Rx:  refill  RX Name and Strength:  levothyroxine (SYNTHROID) 75 MCG tablet  gabapentin (NEURONTIN) 400 MG capsule    How is the patient currently taking it? 1Xday  Is this a 30 day or 90 day RX:  90  Preferred Pharmacy with phone number:    [facility] - [address] [address]  Phone: [phone] Fax: [phone]    Local or Mail Order:  mail   Ordering Provider:  jaison Silva Call Back Number:  [phone] (home)     Additional Information:  please call pt to advise. Thanks!

## 2022-03-15 RX ORDER — ESCITALOPRAM OXALATE 20 MG/1
20 TABLET ORAL NIGHTLY
Qty: 90 TABLET | Refills: 3 | Status: SHIPPED | OUTPATIENT
Start: 2022-03-15 | End: 2023-07-06 | Stop reason: SDUPTHER

## 2022-03-15 RX ORDER — LEVOTHYROXINE SODIUM 75 UG/1
75 TABLET ORAL DAILY
Qty: 90 TABLET | Refills: 3 | Status: SHIPPED | OUTPATIENT
Start: 2022-03-15 | End: 2023-05-23

## 2022-03-15 NOTE — ED PROVIDER NOTES
Encounter Date: 3/14/2022       History     Chief Complaint   Patient presents with    Urinary Retention     Urinary retention for the past couple weeks, she is concerned her abd adhesions are causing these problems. She does note dysuria at times.      7:57 PM  Patient is a 66-year-old female with a history of thyroid disease, CHF, last echo within normal limits, HTN, HLD, CAD, COPD, current tobacco use, diverticulosis, seizures, pancreatitis, chronic pain, status post nerve stimulator who presents to [facility] ED with multiple complaints.    Patient states ever since her bowel resection 1986, she has had chronic bowel changes.  She states that she does go almost on a daily basis, but her stools or soft and very then.  She has noted this change for the past 2 years.  States that she sometimes has to wear depends because she will have bowel incontinence.  She attributes this to her abdominal surgeries and believes that her adhesions or causing her to have these changes.    Patient also endorses hard time urinating.  She states that she has burning when she has the urgency, but that it does not burn when she is actually urinating.  She has noted urine decreased and darker urine.  States that this concerns her because she feels as if she increased her fluid intake.  She has not had hematuria.  This has been going on for the past 2-3 weeks.    She has also noted intermittent in lower extremity edema for the past 2-3 weeks.  She states that her physical therapist for her chronic neck pain initially noted it.  States that her edema today is not as bad as the past few days.  She has increased pain with increased edema.    She is also noted about a 15 lb weight gain in the past 5 days.  She has a chronic cough and sinus congestion, but feels palpitations and coughs more when she lays down.  She denies any chest pain.    On chart review, patient has had EGD, colonoscopy both last year and recently lower EUS on 3/8/2022.  Areas  "were biopsied and recent pathology report does not show any carcinoma.  She overall had an unremarkable test. She has not seen urology yet.        Review of patient's allergies indicates:   Allergen Reactions    Aspirin Other (See Comments)     "Makes stomach feel like it's on fire"    Phenergan [promethazine] Other (See Comments)     SEIZURES    Lortab [hydrocodone-acetaminophen] Itching     Able to take generic Norco     Past Medical History:   Diagnosis Date    Acute pancreatitis     Back pain     CAD (coronary artery disease)     CHF (congestive heart failure)     COPD (chronic obstructive pulmonary disease)     Depression     Diverticulosis     Encounter for blood transfusion     GERD (gastroesophageal reflux disease)     History of blood clots     1986 AFTER BOWEL RESCETION    History of bowel resection     Hyperlipidemia     Hypertension     Peritonitis     Seizures     HAD A SEIZURE FROM PHENERGAN     Stroke     Thyroid disease     TIA (transient ischemic attack)     Wears dentures     upper     Past Surgical History:   Procedure Laterality Date    ADRENAL GLAND SURGERY      APPENDECTOMY      BACK SURGERY      CAUDAL EPIDURAL STEROID INJECTION N/A 3/1/2021    Procedure: Injection-steroid-epidural-caudal;  Surgeon: Socorro Lorenz MD;  Location: [facility] OR;  Service: Pain Management;  Laterality: N/A;    CHOLECYSTECTOMY      COLONOSCOPY      COLONOSCOPY N/A 6/10/2021    Procedure: COLONOSCOPY;  Surgeon: Sheree Metz MD;  Location: [facility] ENDO;  Service: Endoscopy;  Laterality: N/A;    COLONOSCOPY N/A 3/8/2022    Procedure: COLONOSCOPY;  Surgeon: Maurilio Rodriguez MD;  Location: [facility] ([address] [place] [address]);  Service: Endoscopy;  Laterality: N/A;  Pt to perform at-home COVID test morning of procedure-DS  2/24-Blood thinner approval rec'dfrom Dr. Walsh (see letters tab 2/24/22)-DS  3/2-Instructions sent via email-DS  3/7-Pt unable to come earlier due to ride-DS    CORONARY STENT PLACEMENT   "    CYSTOURETHROSCOPY N/A 11/13/2019    Procedure: CYSTOURETHROSCOPY;  Surgeon: Shun Nuñez MD;  Location: [facility] OR;  Service: Urology;  Laterality: N/A;    ENDOSCOPIC ULTRASOUND OF LOWER GASTROINTESTINAL TRACT N/A 3/8/2022    Procedure: ULTRASOUND, LOWER GI TRACT, ENDOSCOPIC;  Surgeon: Maurilio Rodriguez MD;  Location: [facility] ENDO (2ND FLR);  Service: Endoscopy;  Laterality: N/A;  Pt to perform at-home COVID test morning of procedure-DS  2/24-Blood thinner approval rec'[facility] Dr. Walsh (see letters tab 2/24/22)-DS  3/2-Instructions sent via email-DS  3/7-Pt unable to come earlier due to ride-DS    ENDOSCOPIC ULTRASOUND OF UPPER GASTROINTESTINAL TRACT N/A 11/25/2019    Procedure: ULTRASOUND, UPPER GI TRACT, ENDOSCOPIC;  Surgeon: Alhaji Bridges MD;  Location: [facility] (2ND FLR);  Service: Endoscopy;  Laterality: N/A;  5 day hold Plavix, Dr Jovanni Serrano - pg  PM prep    ENDOSCOPIC ULTRASOUND OF UPPER GASTROINTESTINAL TRACT N/A 11/2/2020    Procedure: ULTRASOUND, UPPER GI TRACT, ENDOSCOPIC;  Surgeon: Maurilio Rodriguez MD;  Location: [facility] ENDO (2ND FLR);  Service: Endoscopy;  Laterality: N/A;  5 day hold Plavix, Dr Roman Walsh - pg  Covid-19 test 10/30/20 at [facility]    EPIDURAL STEROID INJECTION INTO CERVICAL SPINE N/A 12/8/2021    Procedure: Injection-steroid-epidural-cervical;  Surgeon: Socorro Lorenz MD;  Location: [facility] OR;  Service: Pain Management;  Laterality: N/A;    ESOPHAGOGASTRODUODENOSCOPY N/A 6/10/2021    Procedure: EGD (ESOPHAGOGASTRODUODENOSCOPY);  Surgeon: Sheree Metz MD;  Location: [facility] ENDO;  Service: Endoscopy;  Laterality: N/A;    HERNIA REPAIR      HYSTERECTOMY      INCISIONAL HERNIA REPAIR      INJECTION OF ANESTHETIC AGENT AROUND NERVE Right 5/27/2019    Procedure: RIGHT L5-S3 MEDIAL BRANCH BLOCKS;  Surgeon: Sneha Aragon MD;  Location: [facility] OR;  Service: Pain Management;  Laterality: Right;  **DO NOT STOP PLAVIX**    INJECTION OF JOINT Right 9/22/2021    Procedure:  Injection, Joint; Right SI Joint Injection;  Surgeon: Socorro Lorenz MD;  Location: [facility] OR;  Service: Pain Management;  Laterality: Right;  Oral    INSERTION OF DORSAL COLUMN NERVE STIMULATOR FOR TRIAL N/A 6/7/2021    Procedure: INSERTION, NEUROSTIMULATOR, SPINAL CORD, DORSAL COLUMN, FOR TRIAL;  Surgeon: Socorro Lorenz MD;  Location: [facility] OR;  Service: Pain Management;  Laterality: N/A;  nevro rep confirmed start time    instestine      bowel section    KNEE ARTHROPLASTY Right 12/18/2019    Procedure: ARTHROPLASTY, KNEE;  Surgeon: Ike Briceño II, MD;  Location: [facility] OR;  Service: Orthopedics;  Laterality: Right;    KNEE SURGERY  1983    OOPHORECTOMY      PARATHYROID GLAND SURGERY      3 surgeries    RADIOFREQUENCY ABLATION Right 6/10/2019    Procedure: Radiofrequency Ablation - RIGHT L3-5 RADIOFREQUENCY ABLATION WITH Meditech SolutionYARD COOLIEF THERMAL SYSTEM;  Surgeon: Sneha Aragon MD;  Location: [facility] OR;  Service: Pain Management;  Laterality: Right;  **HOLD PLAVIX x 7 DAYS PRIOR**    UPPER GASTROINTESTINAL ENDOSCOPY       Family History   Problem Relation Age of Onset    Stroke Maternal Grandmother     Cancer Maternal Grandfather     Stroke Mother     Heart disease Father     Breast cancer Sister      Social History     Tobacco Use    Smoking status: Current Every Day Smoker     Packs/day: 1.00     Years: 46.00     Pack years: 46.00     Types: Cigarettes    Smokeless tobacco: Never Used   Substance Use Topics    Alcohol use: Not Currently    Drug use: No     Review of Systems   Constitutional: Positive for activity change and unexpected weight change. Negative for chills, diaphoresis and fever.   HENT: Positive for congestion. Negative for sore throat.    Respiratory: Positive for cough. Negative for shortness of breath.    Cardiovascular: Negative for chest pain.   Gastrointestinal: Positive for abdominal pain and diarrhea. Negative for blood in stool, nausea and vomiting.   Genitourinary:  Positive for decreased urine volume, dysuria, frequency and urgency.   Musculoskeletal: Negative for gait problem.   Skin: Negative for rash.   Neurological: Negative for weakness and headaches.   Hematological: Does not bruise/bleed easily.       Physical Exam     Initial Vitals [03/14/22 1949]   BP Pulse Resp Temp SpO2   (!) 149/67 60 18 98.2 °F (36.8 °C) 97 %      MAP       --         Physical Exam    Vitals reviewed.  Constitutional: She appears well-developed and well-nourished. She is not diaphoretic. She is cooperative.  Non-toxic appearance. She does not have a sickly appearance. She does not appear ill. No distress. Face mask in place.   HENT:   Head: Normocephalic and atraumatic.   Nose: Nose normal.   Mouth/Throat: No trismus in the jaw.   Eyes: Conjunctivae and EOM are normal.   Neck:   Normal range of motion.  Cardiovascular: Normal rate and regular rhythm.   Pulmonary/Chest: Breath sounds normal. No accessory muscle usage. No tachypnea. No respiratory distress. She has no wheezes. She has no rhonchi. She has no rales.   Abdominal: Abdomen is soft. She exhibits no distension. There is no abdominal tenderness. There is no rebound and no guarding.   Musculoskeletal:         General: Normal range of motion.      Cervical back: Normal range of motion.      Comments: Trace pitting edema. No unilateral leg swelling. No calf tenderness.     Neurological: She is alert. She has normal strength.   Skin: Skin is warm and dry. No erythema. No pallor.   Psychiatric: Her mood appears anxious.         ED Course   Procedures  Labs Reviewed   CBC W/ AUTO DIFFERENTIAL - Abnormal; Notable for the following components:       Result Value    RBC 3.69 (*)     Hemoglobin 11.4 (*)     Hematocrit 36.5 (*)     MCV 99 (*)     MCHC 31.2 (*)     All other components within normal limits   COMPREHENSIVE METABOLIC PANEL - Abnormal; Notable for the following components:    ALT 5 (*)     eGFR if non  52.8 (*)     All  other components within normal limits   B-TYPE NATRIURETIC PEPTIDE - Abnormal; Notable for the following components:     (*)     All other components within normal limits   URINALYSIS, REFLEX TO URINE CULTURE - Abnormal; Notable for the following components:    Ketones, UA Trace (*)     All other components within normal limits    Narrative:     Specimen Source->Urine   HEPATITIS C ANTIBODY          Imaging Results          X-Ray Chest PA And Lateral (Final result)  Result time 03/14/22 21:17:01    Final result by Oneil Stanton MD (03/14/22 21:17:01)                 Impression:      No acute radiographic abnormality.      Electronically signed by: Oneil Stanton  Date:    03/14/2022  Time:    21:17             Narrative:    EXAMINATION:  XR CHEST PA AND LATERAL    CLINICAL HISTORY:  Localized edema    TECHNIQUE:  PA and lateral views of the chest were performed.    COMPARISON:  06/15/2021    FINDINGS:  Anterior cervical fusion hardware in the lower cervical spine.  Neurostimulator leads at the mid chest level.  Aortic atherosclerosis.    The lungs are clear, with normal appearance of pulmonary vasculature and no pleural effusion or pneumothorax.    The cardiac silhouette is normal in size. The hilar and mediastinal contours are unremarkable.    Bones are intact.                                 Medications - No data to display  Medical Decision Making:   History:   Old Medical Records: I decided to obtain old medical records.  Old Records Summarized: records from clinic visits and records from previous admission(s).  Initial Assessment:   Patient is a 66-year-old female with a history of thyroid disease, CHF, last echo within normal limits, HTN, HLD, CAD, COPD, current tobacco use, diverticulosis, seizures, pancreatitis, chronic pain, status post nerve stimulator who presents to [facility] ED with multiple complaints.  Differential Diagnosis:   Includes but is not limited to UTI, for incontinence, stress  "incontinence, interstitial cystitis, constipation, IBS, gastroenteritis, kidney injury, heart failure, dependent edema, chronic venous insufficiency.  Patient's abdomen is soft, nontender nondistended.  I doubt acute surgical abdomen.  Clinical Tests:   Lab Tests: Ordered and Reviewed  Radiological Study: Ordered and Reviewed  ED Management:  Will initiate workup and continue to monitor.             ED Course as of 03/15/22 1116   Mon Mar 14, 2022   1953 BP(!): 149/67 [CL]   1954 Temp: 98.2 °F (36.8 °C) [CL]   1954 Pulse: 60 [CL]   1954 Resp: 18 [CL]   1954 SpO2: 97 % [CL]   2107 WBC: 9.40 [CL]   2107 Hemoglobin(!): 11.4 [CL]   2107 Platelets: 234 [CL]   2129 X-Ray Chest PA And Lateral  "No acute radiographic abnormality." [CL]   2140 Sodium: 140 [CL]   2140 Potassium: 4.4 [CL]   2140 Chloride: 101 [CL]   2140 Glucose: 99 [CL]   2140 BUN: 18 [CL]   2140 Creatinine: 1.1  No TINO. [CL]   2141 BILIRUBIN TOTAL: 0.4 [CL]   2141 AST: 10 [CL]   2141 ALT(!): 5 [CL]   2141 Anion Gap: 12 [CL]   2141 Urinalysis, Reflex to Urine Culture Urine, Clean Catch(!)  No signs of UTI or blood in urine. [CL]      ED Course User Index  [CL] TREY Leyva history, physical exam, and workup today, I out any life-threatening conditions.  She has baseline anemia.  No signs of kidney, liver, or heart failure.  Her urinalysis does not have any signs of blood or UTI.  I suspect that she could have some form of urge incontinence.  As for as her bowel habits, this also sounds chronic in nature.  She has had proper outpatient testing including scopes but did not show any significant abnormality including after biopsy.  We discussed proper dietary changes which include high-fiber diet and supplement with Metamucil.  She is to follow up closely with Urology and Gastroenterology for re-evaluation of the symptoms.  Return to ED precautions were given for new or worsening symptoms.  Patient is stable for discharge.      Clinical " Impression:   Final diagnoses:  [R60.0] Bilateral leg edema  [R19.4] Bowel habit changes (Primary)  [R34] Urination decrease          ED Disposition Condition    Discharge Stable        ED Prescriptions     None        Follow-up Information     Follow up With Specialties Details Why Contact Info Additional Information    Alfredo Hwang - Urology [facility] Urology Schedule an appointment as soon as possible for a visit   [address] [facility] [id number] [phone] Main Building, 4th Floor Please park in South Garage and take Atrium elevator    Alfredo Hwang - Gi Center [facility] Gastroenterology Schedule an appointment as soon as possible for a visit   [address] [facility] [id number] [phone] GI Center & Urology - Main Building, 4th Floor Please park in [address] Garage and take Atrium elevator    Alfredo Hwang - Emergency Dept Emergency Medicine  If symptoms worsen [address] [facility] [id number] [phone]           Future Appointments   Date Time Provider Department Center   3/17/2022  9:00 AM Socorro Lorenz MD [facility]   3/18/2022  1:00 PM Trinh Reyes, PT [facility]   3/21/2022  1:00 PM Trinh Reyes, PT [facility]   3/24/2022  1:40 PM Roman Walsh MD [facility]   3/25/2022  1:00 PM Trinh Reyes PT [facility]   4/19/2022 12:40 PM Gela Traore DO [facility] F[facility] ANALIA Hays PA-C  03/15/22 1116

## 2022-03-15 NOTE — ED TRIAGE NOTES
Pt reports urinary retention that has been going on for a few weeks. She reports multiple abdominal surgeries in the past that she feels is contributing to this. She also reports 15# weight gain over the past week, she states she had a colonoscopy done about a week ago

## 2022-03-15 NOTE — DISCHARGE INSTRUCTIONS
You do not have signs of UTI, heart failure, kidney failure, or liver failure.    Start high fiber diet as discussed. Refer to following pages. Consider Metamucil daily if unable to eat enough fiber.   Stay hydrated. Stay active.   Follow up with Urology. Follow up with Gastroenterology.   Return to the ED for new or worsening symptoms.   Future Appointments   Date Time Provider Department Center   3/17/2022  9:00 AM Socorro Lorenz MD [facility]   3/18/2022  1:00 PM Trinh Reyes, PT [facility]   3/21/2022  1:00 PM [facility]   3/24/2022  1:40 PM Roman Walsh MD [facility] CARDIO O at [facility]   3/25/2022  1:00 PM Trinh Reyes, PT [facility] hospital RE[facility]   4/19/2022 12:40 PM Gela Traore DO [facility] FELICAINO DSOUZA  C

## 2022-03-16 DIAGNOSIS — Z12.31 OTHER SCREENING MAMMOGRAM: ICD-10-CM

## 2022-03-16 LAB — HCV AB SERPL QL IA: NEGATIVE

## 2022-03-17 ENCOUNTER — OFFICE VISIT (OUTPATIENT)
Dept: PAIN MEDICINE | Facility: CLINIC | Age: 67
End: 2022-03-17
Payer: MEDICARE

## 2022-03-17 VITALS
HEART RATE: 53 BPM | HEIGHT: 67 IN | DIASTOLIC BLOOD PRESSURE: 65 MMHG | WEIGHT: 185 LBS | SYSTOLIC BLOOD PRESSURE: 138 MMHG | BODY MASS INDEX: 29.03 KG/M2 | RESPIRATION RATE: 18 BRPM

## 2022-03-17 DIAGNOSIS — M96.1 FAILED BACK SYNDROME OF LUMBAR SPINE: ICD-10-CM

## 2022-03-17 DIAGNOSIS — M48.07 SPINAL STENOSIS, LUMBOSACRAL REGION: ICD-10-CM

## 2022-03-17 DIAGNOSIS — F11.90 CHRONIC, CONTINUOUS USE OF OPIOIDS: ICD-10-CM

## 2022-03-17 DIAGNOSIS — M43.07 LUMBOSACRAL SPONDYLOLYSIS: ICD-10-CM

## 2022-03-17 DIAGNOSIS — M96.1 POSTLAMINECTOMY SYNDROME OF LUMBAR REGION: ICD-10-CM

## 2022-03-17 DIAGNOSIS — M53.3 SACROILIAC JOINT DYSFUNCTION: Primary | ICD-10-CM

## 2022-03-17 PROCEDURE — 99999 PR PBB SHADOW E&M-EST. PATIENT-LVL IV: CPT | Mod: PBBFAC,,, | Performed by: ANESTHESIOLOGY

## 2022-03-17 PROCEDURE — 99999 PR PBB SHADOW E&M-EST. PATIENT-LVL IV: ICD-10-PCS | Mod: PBBFAC,,, | Performed by: ANESTHESIOLOGY

## 2022-03-17 PROCEDURE — 99214 OFFICE O/P EST MOD 30 MIN: CPT | Mod: S$PBB,,, | Performed by: ANESTHESIOLOGY

## 2022-03-17 PROCEDURE — 99214 PR OFFICE/OUTPT VISIT, EST, LEVL IV, 30-39 MIN: ICD-10-PCS | Mod: S$PBB,,, | Performed by: ANESTHESIOLOGY

## 2022-03-17 PROCEDURE — 99214 OFFICE O/P EST MOD 30 MIN: CPT | Mod: PBBFAC,PO | Performed by: ANESTHESIOLOGY

## 2022-03-17 PROCEDURE — 80307 DRUG TEST PRSMV CHEM ANLYZR: CPT | Performed by: ANESTHESIOLOGY

## 2022-03-17 RX ORDER — TIZANIDINE 4 MG/1
4 TABLET ORAL EVERY 8 HOURS
Qty: 90 TABLET | Refills: 2 | Status: SHIPPED | OUTPATIENT
Start: 2022-03-17 | End: 2022-06-15

## 2022-03-17 RX ORDER — ONABOTULINUMTOXINA 200 [USP'U]/1
INJECTION, POWDER, LYOPHILIZED, FOR SOLUTION INTRADERMAL; INTRAMUSCULAR
COMMUNITY
Start: 2022-02-11

## 2022-03-17 RX ORDER — OXYCODONE AND ACETAMINOPHEN 10; 325 MG/1; MG/1
1 TABLET ORAL EVERY 8 HOURS PRN
Qty: 90 TABLET | Refills: 0 | Status: SHIPPED | OUTPATIENT
Start: 2022-04-01 | End: 2022-06-06 | Stop reason: SDUPTHER

## 2022-03-17 RX ORDER — RIMEGEPANT SULFATE 75 MG/75MG
TABLET, ORALLY DISINTEGRATING ORAL
COMMUNITY
Start: 2021-12-28 | End: 2022-03-24

## 2022-03-17 NOTE — PROGRESS NOTES
FOLLOW UP NOTE:     CHIEF COMPLAINT: neck and low back pain    INITIAL HISTORY OF PRESENT ILLNESS: Alessia Nelson is a 65 y.o. female with PMH significant for hx of cervical spine surgery, hx of lumbar spine surgery X 3, hx of peritonitis requiring bowel resection, and torticollis (per patient report) presents as an established patient of Dr. Aragon (new to me) for the evaluation of low back and neck pain. The patient reports that she has been in pain for over twenty years. She reports that she had her neck surgery in 2006/2007 in [place]. She reports of benefit in regards to her neck pain. The patient reports of her initial lumbar spine surgery in 2007/2008 in [place] as well. She reports of improvement after her initial surgery. She reports that she had a second lumbar spine surgery in 2008/2009 as her pain recurred. She reports of benefit with this surgery as well. She reports of a third lumbar spine surgery in 2012. She reports of doing well for a couple years until she fell out of her bed. The patient reports that her pain has been worsening ever since then. Low back pain > neck pain. The patient localizes her pain to the area across her lower back.  The patient reports of radiation down her RLE to her knee. The patient reports of numbness in her feet and hands bilaterally. The patient describes her pain as a sharp and aching type of pain. The patient reports that her pain is worsened with any activity that requires walking and activities such as doing laundry. The patient reports of benefit with pain medication. The patient reports that her current pain is a 6/10. Patient denies of any urinary/fecal incontinence, saddle anesthesia, or weakness.      In regards to her neck pain, the patient localizes her pain to the middle/right side of her neck. The patient reports of radiation down her RUE to her elbow. The patient reports that her current pain in her neck is a 6/10. Of note, the patient reports that she would  be willing to undergo surgery if she has to. I have updated the patient's current pain regimen below.     INTERVAL HISTORY OF PRESENT ILLNESS: Alessia Nelson is a 66 y.o. female with PMH significant for hx of cervical spine surgery, hx of lumbar spine surgery X 3, hx of peritonitis requiring bowel resection, and torticollis (per patient report) presents as an established patient for the continued management of back, hip, and neck pain. Today, the patient reports of worsening severity of her chronic neck pain after no inciting incident or trauma. She denies of any relief with her last cervical interlaminar epidural steroid injection. She reports of associated intermittent numbness in her hands. Of note, the patient reports that she has been going to PT and had dry needling with some benefit. She also reports of improvement in headache related pain with dry needling. In regards to her worst pain, she localizes her severe pain to the right side of her lower back. She reports of radiation of her back pain to her right knee. The patient reports that her pain is worsened with bending. Of note, the patient reports that she recently had her SCS reprogrammed ~ 1 week ago and is currently using her SCS. She reports that she is taking percocet  mg PO TID and baclofen with some benefit. The patient denies of any significant changes in her health since her last appointment. The patient also denies of any changes in the character of her pain since her last appointment. The patient reports that her current pain is a 7/10. Patient denies of any urinary/fecal incontinence, saddle anesthesia, or weakness.     INTERVENTIONAL PAIN HISTORY:  12/8/2021: C7-T1 cervical interlaminar epidural steroid injection - no relief  9/22/2021:  Right sacroiliac joint injection - significant relief  6/18/2021: Insertion of SCS ([facility])   6/7/2021: SCS Trial - 70 - 80% benefit  3/1/2021: Caudal epidural steroid injection - no benefit  "  6/10/2019: L5-S3 Right Medial Branch Nerve Coolief thermal Radiofrequency Ablation via Dr. Aragon - no benefit per chart review     CURRENT PAIN MEDICATIONS:   Percocet  mg PO TID  OTC Tylenol   Baclofen 10 mg PO BID/TID  Gabapentin 400 mg PO BID        ROS:  Review of Systems   Constitutional: Negative for chills and fever.   HENT: Negative for sore throat.    Eyes: Negative for visual disturbance.   Respiratory: Negative for shortness of breath.    Cardiovascular: Negative for chest pain.   Gastrointestinal: Negative for nausea and vomiting.   Genitourinary: Negative for difficulty urinating.   Musculoskeletal: Positive for arthralgias, back pain and neck pain.   Skin: Negative for rash.   Allergic/Immunologic: Negative for immunocompromised state.   Neurological: Positive for numbness. Negative for syncope.   Hematological: Does not bruise/bleed easily.   Psychiatric/Behavioral: Negative for suicidal ideas.        MEDICAL, SURGICAL, FAMILY, SOCIAL HX: reviewed    MEDICATIONS/ALLERGIES: reviewed    PHYSICAL EXAM:    VITALS: Vitals reviewed.   Vitals:    03/17/22 0905   BP: 138/65   Pulse: (!) 53   Resp: 18   Weight: 83.9 kg (185 lb)   Height: 5' 7" (1.702 m)   PainSc:   7       Physical Exam  Vitals and nursing note reviewed.   Constitutional:       Appearance: She is not diaphoretic.   HENT:      Head: Normocephalic and atraumatic.   Eyes:      General:         Right eye: No discharge.         Left eye: No discharge.      Conjunctiva/sclera: Conjunctivae normal.   Cardiovascular:      Rate and Rhythm: Normal rate.   Pulmonary:      Effort: Pulmonary effort is normal. No respiratory distress.      Breath sounds: Normal breath sounds.   Abdominal:      Palpations: Abdomen is soft.   Skin:     General: Skin is warm and dry.      Findings: No rash.   Neurological:      Mental Status: She is alert and oriented to person, place, and time.   Psychiatric:         Mood and Affect: Mood and affect normal.         " Cognition and Memory: Memory normal.         Judgment: Judgment normal.        UPPER EXTREMITIES: Normal alignment, normal range of motion, no atrophy, no skin changes,  hair growth and nail growth normal and equal bilaterally. No swelling, no tenderness.    LOWER EXTREMITIES:  Normal alignment, normal range of motion, no atrophy, no skin changes,  hair growth and nail growth normal and equal bilaterally. No swelling, no tenderness.     CERVICAL SPINE:  Cervical spine: ROM is full in flexion, extension and lateral rotation with increased pain with passive ROM  Spurling's maneuver - deferred given prior fusion  Myofascial exam: Tenderness to palpation across cervical paraspinous region bilaterally. Prior anterior scar is well-healed.      LUMBAR SPINE:    Lumbar spine: ROM is limited with flexion extension and oblique extension with increased pain to any maneuvar.    ((+)) Supine straight leg raise R > L  ((+)) Facet loading bilateral  Internal and external rotation of the hip causes no increased pain on either side.  Myofascial exam: Tenderness to palpation across lumbar paraspinous muscles.     ((+)) TTP at the SI joint bilaterally  ((+)) MIKEY's test  ((+)) One leg stand    ((+)) Distraction test    MOTOR: Tone and bulk: normal bilateral upper and lower Strength: normal   Delt      Bi         Tri        WE      WF                        R          5          5          5          5          5          5            L          5          5          5          5          5          5               IP         ADD     ABD     Quad   TA        Gas      HAM  R          5          5          5          5          5          5          5  L          5          5          5          5          5          5          5     SENSATION: Light touch and pinprick intact bilaterally  REFLEXES: normal, symmetric, nonbrisk.  Toes down, no clonus. Negative hilliard's sign bilaterally.  GAIT: normal rise, base, steps, and arm  swing.      IMAGING: no new imaging related to the patient's pain to review    ASSESSMENT: Alessia Nelson is a 66 y.o. female with PMH significant for hx of cervical spine surgery, hx of lumbar spine surgery X 3, hx of peritonitis requiring bowel resection, and torticollis (per patient report) presents as an established patient for the continued management of back, hip, and neck pain. Today, the patient reports of worsening severity of her chronic neck pain after no inciting incident or trauma. She denies of any relief with her last cervical interlaminar epidural steroid injection. The patient localizes her worst pain to the right side of her lower back with radiation of her back pain to her right knee. I suspect her severe right sided low back pain is a recurrence of her pain that previously responded well to a right sacroiliac joint injection. Treatment plan outlined below.     PLAN:  1. Offered repeat right SIJ injection; patient deferred  2. Refilled Percocet  mg PO q 8 hr PRN for breakthrough pain; # 90 tablets; 0 refills.  reviewed without discrepancies.   3. UDS to be collected today. The patient reports that she last took her pain medications last night.   4. Prescribed tizanidine 4 mg PO TID PRN for myofascial pain as she reports of benefit in the past; instructed the patient to discontinue baclofen  5. I have stressed the importance of physical activity and a home exercise plan to help with chronic pain and improve health.  6. RTC in 6-8 weeks for follow-up    Socorro Lorenz MD  Pain Management

## 2022-03-21 ENCOUNTER — PATIENT MESSAGE (OUTPATIENT)
Dept: ADMINISTRATIVE | Facility: HOSPITAL | Age: 67
End: 2022-03-21
Payer: MEDICARE

## 2022-03-21 ENCOUNTER — CLINICAL SUPPORT (OUTPATIENT)
Dept: REHABILITATION | Facility: HOSPITAL | Age: 67
End: 2022-03-21
Payer: MEDICARE

## 2022-03-21 DIAGNOSIS — G89.29 CHRONIC CERVICAL PAIN: Primary | ICD-10-CM

## 2022-03-21 DIAGNOSIS — R68.89 ACTIVITY INTOLERANCE: ICD-10-CM

## 2022-03-21 DIAGNOSIS — M54.2 CHRONIC CERVICAL PAIN: Primary | ICD-10-CM

## 2022-03-21 PROCEDURE — 97140 MANUAL THERAPY 1/> REGIONS: CPT | Mod: PN

## 2022-03-21 NOTE — PROGRESS NOTES
"Physical Therapy Functional Dry Needling Note       Date:  3/21/2022    Name: Alessia Nelson  Clinic Number: [id number]    Therapy Diagnosis:   Encounter Diagnoses   Name Primary?    Chronic cervical pain Yes    Activity intolerance      Physician: Sera Crystal FNP      Start Time:  1:00 pm  Stop Time:   2:15 pm   Total Billable Time:  60  minutes  /  Subjective     Pt reports:  I'm about the same, I decided to go to [place] and see some MD's, I'm tired of all this here, no one wants to do anything for me"    Location:  Left side of her neck; still has some headache over left eye.   Pain:  3-4/10 on left side ; upper/lower back pain this morning - 5/10     Objective     Having more cervical/neck pain today, right/left side of neck, upper thoracic pain, along with back and right knee/leg       DNP  There-ex 40 min   UBE mid resistance 4/4 min   Rowing blue t band x 20   Wall climb x 10 each arm   Pulley x 3 min   Lev. scap 5 x 10 sec each   pec minor 5 x 10 sec each   Posture re-ed        Alessia received the following manual therapy techniques:  X 25 mins   Manual : supine : suboccipital inhibition; aggressive stretching of cervical mm - forward flexion of neck to end range with hold; extension off end of table with end range hold; rotation with flexion/SB to each side with contract relax (MET) to facilitate inhibition of tension;   Had patient sit at edge of mat, using pulsating theragun, worked tissues along bilateral upper traps, rhomboids, PT standing in front of patient - MET with patient providing light resistance - thoracic extension - 15 sec hold x 4 reps.     Palpation Assessment to determine the necessity for Functional Dry Needling  Tenderness/tightness/migraine headaches - bilateral posterior cervical musculature; left/right upper trap; left/right levator; middle scalene, posterior scalene on left , left SCM, left temporal area. . .     Dry Needling:   Dry Needling  for 20  minutes.   Patient supine " with knee bolster; LF-ES per protocol; 2-point SCM protocol and 1 point into anterior scalene. 1 point middle scalene;  - 1TB above clavicle through SCM clavicular head; left TMJ area x 3 needles, corner of left eye, 2 points on inferior mandible; Needles left in-situ x 20 mins;   Call bell placed in reach of patient .      Moist heat to c-spine x 20 mins     Home Exercises and Patient Education Provided     Education provided:   - continue with HEP; monitor symptoms follow DN     Written Handout on response to FDN provided: yes    Alessia demonstrated good  understanding of the education provided.   .     Assessment   ,Alessia is demonstrating increased multi-system problems, increased multi-joint pain; Does continue to report that the PT for her neck is definitely helpful and pain is reduced following treatment; She went to see  the other day, offerred another ANTHONY - she declined - does not want more steroids.     Goals:  Short Term Goals:                1) Decrease max pain to < 6/10 Progressing              2) Decrease tenderness/excess muscle tension to minimal/moderate Progressing              3) Facilitate improved posture Progressing              4) Facilitate improved upper quadrant flexibility Progressing     Long Term Goals:                1) Patient will consistently drive and look over her shoulder without having to turn trunk excessively Progressing              2) Patient will consistently work on puzzle (look down) without increased pain Ongoing              3) Patient will consistently reach into overhead cabinet (to place or retrieve dish) without increased pain Ongoing              4) Patient will sleep > 6 hours per night without interruption by neck pain 5 of 7 days per week Ongoing              5) Improve functional deficit to < 45% as indicated by FOTO neck survey Minimal progress              6) Patient will be independent in HEP.   Progressing     Plan     Continue with Physical Therapy  1-2x/week; Dry Needling, Manual Treatment, TherEx and TherActivities.

## 2022-03-22 LAB
6MAM UR QL: NOT DETECTED
7AMINOCLONAZEPAM UR QL: NOT DETECTED
A-OH ALPRAZ UR QL: NOT DETECTED
ALPHA-OH-MIDAZOLAM: NOT DETECTED
ALPRAZ UR QL: NOT DETECTED
AMPHET UR QL SCN: NOT DETECTED
ANNOTATION COMMENT IMP: NORMAL
ANNOTATION COMMENT IMP: NORMAL
BARBITURATES UR QL: NOT DETECTED
BUPRENORPHINE UR QL: NOT DETECTED
BZE UR QL: NOT DETECTED
CARBOXYTHC UR QL: NOT DETECTED
CARISOPRODOL UR QL: NOT DETECTED
CLONAZEPAM UR QL: NOT DETECTED
CODEINE UR QL: NOT DETECTED
CREAT UR-MCNC: 123.9 MG/DL (ref 20–400)
DIAZEPAM UR QL: NOT DETECTED
ETHYL GLUCURONIDE UR QL: NOT DETECTED
FENTANYL UR QL: NOT DETECTED
GABAPENTIN: PRESENT
HYDROCODONE UR QL: NOT DETECTED
HYDROMORPHONE UR QL: NOT DETECTED
LORAZEPAM UR QL: NOT DETECTED
MDA UR QL: NOT DETECTED
MDEA UR QL: NOT DETECTED
MDMA UR QL: NOT DETECTED
ME-PHENIDATE UR QL: NOT DETECTED
METHADONE UR QL: NOT DETECTED
METHAMPHET UR QL: NOT DETECTED
MIDAZOLAM UR QL SCN: NOT DETECTED
MORPHINE UR QL: NOT DETECTED
NALOXONE: NOT DETECTED
NORBUPRENORPHINE UR QL CFM: NOT DETECTED
NORDIAZEPAM UR QL: NOT DETECTED
NORFENTANYL UR QL: NOT DETECTED
NORHYDROCODONE UR QL CFM: NOT DETECTED
NORMEPERIDINE UR QL CFM: NOT DETECTED
NOROXYCODONE UR QL CFM: PRESENT
NOROXYMORPHONE UR QL SCN: NOT DETECTED
OXAZEPAM UR QL: NOT DETECTED
OXYCODONE UR QL: PRESENT
OXYMORPHONE UR QL: PRESENT
PATHOLOGY STUDY: NORMAL
PCP UR QL: NOT DETECTED
PHENTERMINE UR QL: NOT DETECTED
PREGABALIN: NOT DETECTED
SERVICE CMNT-IMP: NORMAL
TAPENTADOL UR QL SCN: NOT DETECTED
TAPENTADOL UR QL SCN: NOT DETECTED
TEMAZEPAM UR QL: NOT DETECTED
TRAMADOL UR QL: NOT DETECTED
ZOLPIDEM METABOLITE: NOT DETECTED
ZOLPIDEM UR QL: NOT DETECTED

## 2022-03-24 ENCOUNTER — OFFICE VISIT (OUTPATIENT)
Dept: CARDIOLOGY | Facility: CLINIC | Age: 67
End: 2022-03-24
Payer: MEDICARE

## 2022-03-24 VITALS
HEART RATE: 66 BPM | BODY MASS INDEX: 28.88 KG/M2 | HEIGHT: 67 IN | SYSTOLIC BLOOD PRESSURE: 160 MMHG | OXYGEN SATURATION: 98 % | DIASTOLIC BLOOD PRESSURE: 80 MMHG | WEIGHT: 184 LBS

## 2022-03-24 DIAGNOSIS — R60.9 EDEMA, UNSPECIFIED TYPE: ICD-10-CM

## 2022-03-24 DIAGNOSIS — G89.29 CHRONIC MIDLINE BACK PAIN, UNSPECIFIED BACK LOCATION: ICD-10-CM

## 2022-03-24 DIAGNOSIS — E21.0 HYPERPARATHYROID BONE DISEASE: ICD-10-CM

## 2022-03-24 DIAGNOSIS — E03.4 HYPOTHYROIDISM DUE TO ACQUIRED ATROPHY OF THYROID: Primary | ICD-10-CM

## 2022-03-24 DIAGNOSIS — M54.9 CHRONIC MIDLINE BACK PAIN, UNSPECIFIED BACK LOCATION: ICD-10-CM

## 2022-03-24 DIAGNOSIS — F17.200 SMOKER: ICD-10-CM

## 2022-03-24 DIAGNOSIS — E21.3 HYPERPARATHYROIDISM: ICD-10-CM

## 2022-03-24 DIAGNOSIS — G89.29 OTHER CHRONIC PAIN: ICD-10-CM

## 2022-03-24 PROCEDURE — 99215 OFFICE O/P EST HI 40 MIN: CPT | Mod: S$GLB,,, | Performed by: SPECIALIST

## 2022-03-24 PROCEDURE — 99215 PR OFFICE/OUTPT VISIT, EST, LEVL V, 40-54 MIN: ICD-10-PCS | Mod: S$GLB,,, | Performed by: SPECIALIST

## 2022-03-24 NOTE — PROGRESS NOTES
"Subjective:    Patient ID:  Alessia Nelson is a 66 y.o. female who presents for   Congestive Heart Failure, Edema, and Transient Ischemic Attack    HPIfew weeks ago  Gained wt  -ttok extra hctz     Having gastric issues   2)  Swelling   3) to see someone soon about back + bneck -  No   nsaid   shes concerned about edema    4) 3/2020  Renal fail / c diff and HF?         Review of patient's allergies indicates:   Allergen Reactions    Aspirin Other (See Comments)     "Makes stomach feel like it's on fire"    Phenergan [promethazine] Other (See Comments)     SEIZURES    Lortab [hydrocodone-acetaminophen] Itching     Able to take generic Norco       Past Medical History:   Diagnosis Date    Acute pancreatitis     Back pain     CAD (coronary artery disease)     CHF (congestive heart failure)     COPD (chronic obstructive pulmonary disease)     Depression     Diverticulosis     Encounter for blood transfusion     GERD (gastroesophageal reflux disease)     History of blood clots     1986 AFTER BOWEL RESCETION    History of bowel resection     Hyperlipidemia     Hypertension     Peritonitis     Seizures     HAD A SEIZURE FROM PHENERGAN     Stroke     Thyroid disease     TIA (transient ischemic attack)     Wears dentures     upper     Past Surgical History:   Procedure Laterality Date    ADRENAL GLAND SURGERY      APPENDECTOMY      BACK SURGERY      CAUDAL EPIDURAL STEROID INJECTION N/A 3/1/2021    Procedure: Injection-steroid-epidural-caudal;  Surgeon: Socorro Lorenz MD;  Location: [facility] OR;  Service: Pain Management;  Laterality: N/A;    CHOLECYSTECTOMY      COLONOSCOPY      COLONOSCOPY N/A 6/10/2021    Procedure: COLONOSCOPY;  Surgeon: Sheree Metz MD;  Location: [facility];  Service: Endoscopy;  Laterality: N/A;    COLONOSCOPY N/A 3/8/2022    Procedure: COLONOSCOPY;  Surgeon: Maurilio Rodriguez MD;  Location: [facility] ([address] [place] [address]);  Service: Endoscopy;  Laterality: N/A;  Pt to perform " at-home COVID test morning of procedure-DS  2/24-Blood thinner approval rec[facility] Dr[facility] Walsh (see letters tab 2/24/22)-DS  3/2-Instructions sent via email-DS  3/7-Pt unable to come earlier due to ride-DS    CORONARY STENT PLACEMENT      CYSTOURETHROSCOPY N/A 11/13/2019    Procedure: CYSTOURETHROSCOPY;  Surgeon: Shun Nuñez MD;  Location: [facility] OR;  Service: Urology;  Laterality: N/A;    ENDOSCOPIC ULTRASOUND OF LOWER GASTROINTESTINAL TRACT N/A 3/8/2022    Procedure: ULTRASOUND, LOWER GI TRACT, ENDOSCOPIC;  Surgeon: Maurilio Rodriguez MD;  Location: [facility] (2ND FLR);  Service: Endoscopy;  Laterality: N/A;  Pt to perform at-home COVID test morning of procedure-DS  [facility] tab 2[facility]DS  3/2-Instructions sent via email-DS  3/7-Pt unable to come earlier due to ride-DS    ENDOSCOPIC ULTRASOUND OF UPPER GASTROINTESTINAL TRACT N/A 11/25/2019    Procedure: ULTRASOUND, UPPER GI TRACT, ENDOSCOPIC;  Surgeon: Alhaji Bridges MD;  Location: [facility] (2ND FLR);  Service: Endoscopy;  Laterality: N/A;  5 day hold Plavix, Dr Jovanni Serrano - pg  PM prep    ENDOSCOPIC ULTRASOUND OF UPPER GASTROINTESTINAL TRACT N/A 11/2/2020    Procedure: ULTRASOUND, UPPER GI TRACT, ENDOSCOPIC;  Surgeon: Maurilio Rodriguez MD;  Location: [facility] (2ND FLR);  Service: Endoscopy;  Laterality: N/A;  5 day hold Plavix, Dr Roman Walsh - pg  Covid-19 test 10/30/20 at [facility]    EPIDURAL STEROID INJECTION INTO CERVICAL SPINE N/A 12/8/2021    Procedure: Injection-steroid-epidural-cervical;  Surgeon: Socorro Lorenz MD;  Location: [facility] OR;  Service: Pain Management;  Laterality: N/A;    ESOPHAGOGASTRODUODENOSCOPY N/A 6/10/2021    Procedure: EGD (ESOPHAGOGASTRODUODENOSCOPY);  Surgeon: Sheree Metz MD;  Location: [facility] ENDO;  Service: Endoscopy;  Laterality: N/A;    HERNIA REPAIR      HYSTERECTOMY      INCISIONAL HERNIA REPAIR      INJECTION OF ANESTHETIC AGENT AROUND NERVE Right  5/27/2019    Procedure: RIGHT L5-S3 MEDIAL BRANCH BLOCKS;  Surgeon: Sneha Aragon MD;  Location: [facility] OR;  Service: Pain Management;  Laterality: Right;  **DO NOT STOP PLAVIX**    INJECTION OF JOINT Right 9/22/2021    Procedure: Injection, Joint; Right SI Joint Injection;  Surgeon: Socorro Lorenz MD;  Location: [facility] OR;  Service: Pain Management;  Laterality: Right;  Oral    INSERTION OF DORSAL COLUMN NERVE STIMULATOR FOR TRIAL N/A 6/7/2021    Procedure: INSERTION, NEUROSTIMULATOR, SPINAL CORD, DORSAL COLUMN, FOR TRIAL;  Surgeon: Socorro Lorenz MD;  [facility]  Service: Pain Management;  Laterality: N/A;  nevro rep confirmed start time    instestine      bowel section    KNEE ARTHROPLASTY Right 12/18/2019    Procedure: ARTHROPLASTY, KNEE;  Surgeon: Ike Briceño II, MD;  Location: [facility] OR;  Service: Orthopedics;  Laterality: Right;    KNEE SURGERY  1983    OOPHORECTOMY      PARATHYROID GLAND SURGERY      3 surgeries    RADIOFREQUENCY ABLATION Right 6/10/2019    Procedure: Radiofrequency Ablation - RIGHT L3-5 RADIOFREQUENCY ABLATION WITH HALYARD COOLIEF THERMAL SYSTEM;  Surgeon: Sneha Aragon MD;  Location: [facility] OR;  Service: Pain Management;  Laterality: Right;  **HOLD PLAVIX x 7 DAYS PRIOR**    UPPER GASTROINTESTINAL ENDOSCOPY       Social History     Tobacco Use    Smoking status: Current Every Day Smoker     Packs/day: 1.00     Years: 46.00     Pack years: 46.00     Types: Cigarettes    Smokeless tobacco: Never Used   Substance Use Topics    Alcohol use: Not Currently    Drug use: No        Review of Systems     Review of Systems   Constitutional: Positive for weight gain. Negative for decreased appetite, malaise/fatigue and weight loss.   HENT: Negative.  Negative for congestion, hearing loss and tinnitus.    Eyes: Negative.  Negative for blurred vision, vision loss in left eye, vision loss in right eye, visual disturbance and visual halos.   Cardiovascular: Positive for chest  pain. Negative for claudication, irregular heartbeat, leg swelling, near-syncope, orthopnea, palpitations, paroxysmal nocturnal dyspnea and syncope.   Respiratory: Positive for shortness of breath. Negative for cough, hemoptysis, sleep disturbances due to breathing, snoring, sputum production and wheezing.    Endocrine: Negative for polydipsia, polyphagia and polyuria.   Hematologic/Lymphatic: Negative for adenopathy. Does not bruise/bleed easily.   Skin: Negative for dry skin, itching, poor wound healing, rash, skin cancer and suspicious lesions.   Musculoskeletal: Positive for arthritis, back pain, joint pain and joint swelling. Negative for falls, gout, muscle cramps, muscle weakness, neck pain and stiffness.   Gastrointestinal: Positive for bloating and heartburn. Negative for abdominal pain, change in bowel habit, constipation, diarrhea, hematemesis, hemorrhoids, melena, nausea and vomiting.   Genitourinary: Negative.  Negative for bladder incontinence, dysuria, flank pain, frequency, hematuria, nocturia and non-menstrual bleeding.   Neurological: Negative for brief paralysis, difficulty with concentration, disturbances in coordination, dizziness, focal weakness, headaches, loss of balance, numbness, paresthesias and tremors.   Psychiatric/Behavioral: Negative for altered mental status, depression, hallucinations, memory loss, substance abuse, suicidal ideas and thoughts of violence. The patient does not have insomnia and is not nervous/anxious.    Allergic/Immunologic: Negative for environmental allergies and hives.           Objective:        Vitals:    03/24/22 1348   BP: (!) 160/80   Pulse: 66       Lab Results   Component Value Date    WBC 9.40 03/14/2022    HGB 11.4 (L) 03/14/2022    HCT 36.5 (L) 03/14/2022     03/14/2022    CHOL 196 10/15/2020    TRIG 221 (H) 10/15/2020    HDL 45 10/15/2020    ALT 5 (L) 03/14/2022    AST 10 03/14/2022     03/14/2022    K 4.4 03/14/2022     03/14/2022     CREATININE 1.1 03/14/2022    BUN 18 03/14/2022    CO2 27 03/14/2022    TSH 2.932 12/19/2021        ECHOCARDIOGRAM RESULTS  Results for orders placed in visit on 09/23/20    Echo Color Flow Doppler? Yes; Bubble Contrast? No    Interpretation Summary  · There is left ventricular concentric remodeling.  · The left ventricle is normal in size with normal systolic function. The estimated ejection fraction is 63%.  · Normal left ventricular diastolic function.  · Normal right ventricular systolic function.  · No pulmonary hypertension  · Normal central venous pressure (3 mmHg).  · The estimated PA systolic pressure is 17 mmHg.      CURRENT/PREVIOUS VISIT EKG  Results for orders placed or performed during the hospital encounter of 06/18/21   EKG 12-lead    Collection Time: 06/18/21  5:59 AM    Narrative    Test Reason : Z01.818,    Vent. Rate : 054 BPM     Atrial Rate : 054 BPM     P-R Int : 166 ms          QRS Dur : 094 ms      QT Int : 474 ms       P-R-T Axes : 059 -02 042 degrees     QTc Int : 449 ms    Sinus bradycardia with Premature atrial complexes  Otherwise normal ECG  When compared with ECG of 23-SEP-2020 17:17,  Premature atrial complexes are now Present  Confirmed by Nina VERGARA, Jovanni REDD ([id number]) on 6/23/2021 3:11:13 PM    Referred By: [email or web address]   SELF           Confirmed By:Jovanni Wood MD     Results for orders placed in visit on 09/23/20    Echo Color Flow Doppler? Yes; Bubble Contrast? No    Interpretation Summary  · There is left ventricular concentric remodeling.  · The left ventricle is normal in size with normal systolic function. The estimated ejection fraction is 63%.  · Normal left ventricular diastolic function.  · Normal right ventricular systolic function.  · No pulmonary hypertension  · Normal central venous pressure (3 mmHg).  · The estimated PA systolic pressure is 17 mmHg.    Results for orders placed during the hospital encounter of 09/28/20    Nuclear Stress - Cardiology  Interpreted    Interpretation Summary    The study shows normal myocardial perfusion.    The perfusion scan is free of evidence from myocardial ischemia or injury.    Gated perfusion images showed an ejection fraction of % at rest and 84% post stress. Normal ejection fraction is greater than %.    The EKG portion of this study is negative for ischemia.    The patient reported no chest pain during the stress test.    There were no arrhythmias during stress.      PHYSICAL EXAM    Physical Exam 140/80  Lung clwear cor ok  Legs no  Edema     Medication List with Changes/Refills   Current Medications    ACETAMINOPHEN (TYLENOL) 325 MG TABLET    Take 2 tablets (650 mg total) by mouth every 6 (six) hours as needed for Pain (Do not take with any other tylenol containing products).    ALLOPURINOL (ZYLOPRIM) 300 MG TABLET    TAKE 1 TABLET EVERY DAY    BUPROPION (WELLBUTRIN XL) 150 MG TB24 TABLET    TAKE 1 TABLET EVERY DAY    CLOPIDOGREL (PLAVIX) 75 MG TABLET    TAKE 1 TABLET EVERY DAY    COLESTIPOL (COLESTID) 1 GRAM TAB    TAKE 2 TABLETS ONE TIME DAILY    CYANOCOBALAMIN 1,000 MCG/ML INJECTION    Inject 1 mL (1,000 mcg total) into the muscle every 14 (fourteen) days.    ESCITALOPRAM OXALATE (LEXAPRO) 20 MG TABLET    Take 1 tablet (20 mg total) by mouth every evening.    GABAPENTIN (NEURONTIN) 400 MG CAPSULE    Take 1 capsule (400 mg total) by mouth 2 (two) times daily.    HYDROCHLOROTHIAZIDE (HYDRODIURIL) 12.5 MG TAB    Take 1 tablet (12.5 mg total) by mouth every Mon, Wed, Fri.    LEVOTHYROXINE (SYNTHROID) 75 MCG TABLET    Take 1 tablet (75 mcg total) by mouth once daily.    MAGNESIUM OXIDE (MAG-OX) 400 MG (241.3 MG MAGNESIUM) TABLET    Take 1 tablet (400 mg total) by mouth once daily.    METOPROLOL TARTRATE (LOPRESSOR) 100 MG TABLET    Take 0.5 tablets (50 mg total) by mouth Daily.    ONABOTULINUMTOXINA (BOTOX) 200 UNIT SOLR    as directed    ONDANSETRON (ZOFRAN) 4 MG TABLET    Take 2 tablets (8 mg total) by mouth every  8 (eight) hours as needed for Nausea.    OXYCODONE-ACETAMINOPHEN (PERCOCET)  MG PER TABLET    Take 1 tablet by mouth every 8 (eight) hours as needed for Pain.    OXYCODONE-ACETAMINOPHEN (PERCOCET)  MG PER TABLET    Take 1 tablet by mouth every 8 (eight) hours as needed for Pain.    PANTOPRAZOLE (PROTONIX) 40 MG TABLET    TAKE 1 TABLET EVERY DAY    POTASSIUM CHLORIDE SA (K-DUR,KLOR-CON) 20 MEQ TABLET    TAKE 1 TABLET EVERY DAY    RIMEGEPANT (NURTEC) 75 MG ODT    1 tablet on the tongue and allow to dissolve    TENS UNIT AND ELECTRODES CMPK    1 Units by Misc.(Non-Drug; Combo Route) route daily as needed.    TIZANIDINE (ZANAFLEX) 4 MG TABLET    Take 1 tablet (4 mg total) by mouth every 8 (eight) hours.    TRAZODONE (DESYREL) 100 MG TABLET    TAKE 1 TABLET EVERY EVENING    ZENPEP 10,000-32,000 -42,000 UNIT CPDR    Take 1 capsule by mouth 3 (three) times daily with meals.   Discontinued Medications    BACLOFEN (LIORESAL) 10 MG TABLET    Take 1 tablet (10 mg total) by mouth 3 (three) times daily as needed (myofascial pain).           Assessment:     chronic axial skeletal pain  History of renal failure several years ago when she had Clostridium difficile  Intermittent swelling-she claims she is on low-sodium diet  Echo and stress test in 2020 over normal  She has had stent placement is on Plavix  I personally reviewed old and new ecg's, lab reports,, xray reports  and  other cardiovascular studies including  echo's, stress tests, angiogram reports, holters,and vascular studies .  In addition I evaluated original cardiac cath  ___echo  ____cxr ______ct ____scan on [employer] or [employer] or other viewing platforms .  I reviewed  office and hospital notes Yes ___x of  referring providers.       Plan:   Continue as is  Stay on the current medications    Problem List Items Addressed This Visit    None          No follow-ups on file.

## 2022-03-25 ENCOUNTER — CLINICAL SUPPORT (OUTPATIENT)
Dept: REHABILITATION | Facility: HOSPITAL | Age: 67
End: 2022-03-25
Payer: MEDICARE

## 2022-03-25 DIAGNOSIS — G89.29 CHRONIC CERVICAL PAIN: Primary | ICD-10-CM

## 2022-03-25 DIAGNOSIS — R68.89 ACTIVITY INTOLERANCE: ICD-10-CM

## 2022-03-25 DIAGNOSIS — M54.2 CHRONIC CERVICAL PAIN: Primary | ICD-10-CM

## 2022-03-25 PROCEDURE — 97140 MANUAL THERAPY 1/> REGIONS: CPT | Mod: PN

## 2022-03-25 NOTE — PROGRESS NOTES
Physical Therapy Functional Dry Needling Note       Date:  3/25/2022    Name: Alessia Nelson  Clinic Number: [id number]    Therapy Diagnosis:   Encounter Diagnoses   Name Primary?    Chronic cervical pain Yes    Activity intolerance      Physician: Sera Crystal FNP      Start Time:  1:15 pm  Stop Time:   2:30 pm    Total Billable Time:  60  minutes  /  Subjective     Pt reports:  Nothing new, I'm hurting.     Location:  Left side of her neck; still has some headache over left eye.   Pain:  3-4/10 on left side ; upper/lower back pain this morning - 5/10     Objective     Having more cervical/neck pain today, right/left side of neck, upper thoracic pain, along with back and right knee/leg; Has made appointments in [facility] with MD's to address her continued symptoms with gastro, cervical/lumbar pain.       DNP  There-ex 40 min   UBE mid resistance 4/4 min   Rowing blue t band x 20   Wall climb x 10 each arm   Pulley x 3 min   Lev. scap 5 x 10 sec each   pec minor 5 x 10 sec each   Posture re-ed        Alessia received the following manual therapy techniques:  X 25 mins   Manual : supine : suboccipital inhibition; aggressive stretching of cervical mm - forward flexion of neck to end range with hold; extension off end of table with end range hold; rotation with flexion/SB to each side with contract relax (MET) to facilitate inhibition of tension;  Prone position: Grade II extension of thoracic spine, C7/T1; scouring of bilateral scapula in all planes of motion.     Palpation Assessment to determine the necessity for Functional Dry Needling  Tenderness/tightness/migraine headaches - bilateral posterior cervical musculature; left/right upper trap; left/right levator; middle scalene, posterior scalene on left , left SCM, left temporal area. . .     Dry Needling:   Dry Needling  for 20  minutes.   Patient supine with knee bolster; LF-ES per protocol; 2-point SCM protocol and 1 point into anterior scalene. 1 point  middle scalene;  - 1TB above clavicle through SCM clavicular head; left TMJ area x 3 needles, corner of left eye, 2 points on inferior mandible; Needles left in-situ x 20 mins;   Call bell placed in reach of patient .      Moist heat to c-spine x 20 mins     Home Exercises and Patient Education Provided     Education provided:   - continue with HEP; monitor symptoms follow DN     Written Handout on response to FDN provided: yes    Alessia demonstrated good  understanding of the education provided.   .     Assessment   ,Alessia is demonstrating increased multi-system problems, increased multi-joint pain; Does continue to report that the PT for her neck is definitely helpful and pain is reduced following treatment; Alessia has one more PT visit scheduled then will be going to [place] to pursue other medical management options - if any.  We will discharge her therapy after next week. No lasting improvement made from Dry Needling and manual tx, just offering several days of relief for her - which has been significant for Alessia at this time.     Goals:  Short Term Goals:                1) Decrease max pain to < 6/10 Progressing              2) Decrease tenderness/excess muscle tension to minimal/moderate Progressing              3) Facilitate improved posture Progressing              4) Facilitate improved upper quadrant flexibility Progressing     Long Term Goals:                1) Patient will consistently drive and look over her shoulder without having to turn trunk excessively Progressing              2) Patient will consistently work on puzzle (look down) without increased pain Ongoing              3) Patient will consistently reach into overhead cabinet (to place or retrieve dish) without increased pain Ongoing              4) Patient will sleep > 6 hours per night without interruption by neck pain 5 of 7 days per week Ongoing              5) Improve functional deficit to < 45% as indicated by FOTO neck survey Minimal  progress              6) Patient will be independent in HEP.   Progressing     Plan     Continue with Physical Therapy 1 more visit - then discharge.

## 2022-04-04 ENCOUNTER — PATIENT MESSAGE (OUTPATIENT)
Dept: ADMINISTRATIVE | Facility: HOSPITAL | Age: 67
End: 2022-04-04
Payer: MEDICARE

## 2022-04-05 ENCOUNTER — PATIENT OUTREACH (OUTPATIENT)
Dept: ADMINISTRATIVE | Facility: HOSPITAL | Age: 67
End: 2022-04-05
Payer: MEDICARE

## 2022-04-05 ENCOUNTER — PATIENT MESSAGE (OUTPATIENT)
Dept: ADMINISTRATIVE | Facility: HOSPITAL | Age: 67
End: 2022-04-05
Payer: MEDICARE

## 2022-04-05 NOTE — LETTER
April 12, 2022    Alessia Nelson  [address] [place] [address]             [facility]  [address]  [place] [address]  Phone: [phone] Dear [facility] is committed to your overall health and would like to ensure that you are up to date on your recommended test and/or procedures.   Gela Traore,   has found that your chart shows you may be due for the following:          Health Maintenance Due   Topic Date Due    HIV Screening  Never done    Shingles Vaccine (1 of 2) Never done    Pneumococcal Vaccines (Age 65+) (2 of 2 - PPSV23) 10/05/2021    Lipid Panel  10/15/2021    Mammogram  03/10/2022    COVID-19 Vaccine (4 - Booster for Moderna series) 04/16/2022      If you have had any of the above done at another facility, please let us know so that we may obtain copies from that facility.       If you have a copy of these records, please provide a copy for us to scan into your chart.  You are welcome to request that the report be faxed to us at  ([phone]).      If you have an upcoming scheduled appointment for the item above, please disregard this letter.        Tara Kay LPN  Performance Improvement Coordinator  [facility]  [address] [place] [address] [phone]

## 2022-04-12 NOTE — PROGRESS NOTES
"Attempted to outreach patient via "[employer]", no answer after a week. Sending outreach via Mail Out Letter now.  "

## 2022-04-21 ENCOUNTER — PATIENT OUTREACH (OUTPATIENT)
Dept: ADMINISTRATIVE | Facility: OTHER | Age: 67
End: 2022-04-21
Payer: MEDICARE

## 2022-04-22 ENCOUNTER — TELEPHONE (OUTPATIENT)
Dept: PAIN MEDICINE | Facility: CLINIC | Age: 67
End: 2022-04-22
Payer: MEDICARE

## 2022-04-22 NOTE — TELEPHONE ENCOUNTER
----- Message from Ifeoma Matthew sent at 4/22/2022 12:43 PM CDT -----  Contact: Patient  Type: Patient Call Back         Who called: Patient         What is the request in detail: i have Alessia Nelson calling to speak with some from Dr. Flores's staff; state she broke ankle Monday night; states her pain medication needs to be adjusted; sched for surgery next week; please advise        Best call back number: [phone] [facility] phone          Additional Information:   "[id number] - [place] [address] [address]  [address] [address]  Phone: [phone] Fax: [phone]           Thank You

## 2022-04-22 NOTE — TELEPHONE ENCOUNTER
----- Message from Dalila Hill sent at 4/22/2022  1:37 PM CDT -----  Contact: patient  Type:  Patient Returning Call    Who Called:  patient  Who Left Message for Patient:  Hope  Does the patient know what this is regarding?:    Best Call Back Number:  [phone] // [phone] (if calling back in the next 30 minutes)

## 2022-04-22 NOTE — TELEPHONE ENCOUNTER
Informed pt of providers note and new medication dosing instructions. Pt verbalized understanding that her surgeon should manage her post-operative pain.

## 2022-04-22 NOTE — TELEPHONE ENCOUNTER
Pt states she was seen at [facility] ED for a traumatic break of her left ankle. Saw ortho and is having surgery Tuesday or Thursday of next week. Orthopedic doctor told her to contact her pain management dr for pain medication for her ankle. Would like to know if her medication can be adjusted due to increased pain. Will send message to provider and call her back with his response.

## 2022-05-06 ENCOUNTER — TELEPHONE (OUTPATIENT)
Dept: FAMILY MEDICINE | Facility: CLINIC | Age: 67
End: 2022-05-06
Payer: MEDICARE

## 2022-05-06 NOTE — TELEPHONE ENCOUNTER
Placed patient call at number provided in chart. Spoke with Alessia. Verified patient name and date of birth. Patient states that she is due to discharge from [facility] in [place] on Monday. States she will call office when she gets home and settled to schedule follow up appointment with PCP. Instructed to contact clinic staff with any questions or concerns. Verbalized understanding.           ----- Message from Joie Cummings sent at 5/6/2022 10:48 AM CDT -----  Type:  Sooner Appointment Request    Caller is requesting a sooner appointment.  Caller declined first available appointment listed below.  Caller will not accept being placed on the waitlist and is requesting a message be sent to doctor.    Name of Caller:  [facility]  When is the first available appointment?  NA  Symptoms:  Needs follow up   Best Call Back Number:  [phone]  Additional Information:  Please call and advise

## 2022-05-11 DIAGNOSIS — R11.0 NAUSEA: ICD-10-CM

## 2022-05-11 DIAGNOSIS — M43.07 LUMBOSACRAL SPONDYLOLYSIS: ICD-10-CM

## 2022-05-11 DIAGNOSIS — M96.1 POSTLAMINECTOMY SYNDROME OF LUMBAR REGION: ICD-10-CM

## 2022-05-11 DIAGNOSIS — G89.4 CHRONIC PAIN DISORDER: ICD-10-CM

## 2022-05-11 DIAGNOSIS — M51.36 DDD (DEGENERATIVE DISC DISEASE), LUMBAR: ICD-10-CM

## 2022-05-11 RX ORDER — ONDANSETRON 4 MG/1
8 TABLET, ORALLY DISINTEGRATING ORAL EVERY 8 HOURS PRN
Qty: 100 TABLET | Refills: 3 | Status: SHIPPED | OUTPATIENT
Start: 2022-05-11 | End: 2022-07-20 | Stop reason: SDUPTHER

## 2022-05-11 RX ORDER — ONDANSETRON 4 MG/1
8 TABLET, FILM COATED ORAL EVERY 8 HOURS PRN
Qty: 100 TABLET | Refills: 0 | Status: CANCELLED | OUTPATIENT
Start: 2022-05-11

## 2022-05-11 RX ORDER — GABAPENTIN 400 MG/1
400 CAPSULE ORAL 2 TIMES DAILY
Qty: 180 CAPSULE | Refills: 2 | Status: SHIPPED | OUTPATIENT
Start: 2022-05-11 | End: 2022-06-21

## 2022-05-11 NOTE — TELEPHONE ENCOUNTER
----- Message from Vasyl Ramirez sent at 5/11/2022 12:03 PM CDT -----  Contact: pt at [phone]  Type:  Patient Returning Call    Who Called:  pt  Who Left Message for Patient:  Magda/Aretha  Does the patient know what this is regarding?:  yes  Best Call Back Number:  [phone]  Additional Information:  pt is calling the office to return the call made to her today. Please call back and advise.

## 2022-05-11 NOTE — TELEPHONE ENCOUNTER
Pt requesting refill on     Gabapentin 400 mg   Last refill 3.14.22    zofran 4 mg   Requesting dissolvable

## 2022-05-11 NOTE — TELEPHONE ENCOUNTER
----- Message from Vasyl Ramirez sent at 5/11/2022 11:49 AM CDT -----  Contact: pt at [phone]  Type:  RX Refill Request    Who Called:  pt  Refill or New Rx:  REFILL  RX Name and Strength:  ondansetron (ZOFRAN) 4 MG dissolvable and gabapentin (NEURONTIN) 400 MG capsule  How is the patient currently taking it? (ex. 1XDay):  as directed  Is this a 30 day or 90 day RX:  90  Preferred Pharmacy with phone number:    [facility] - [address] [address]  Phone: [phone] Fax: [phone]  Local or Mail Order:  mail  Ordering Provider:  Eugenie Silva Call Back Number:  [phone]  Additional Information:  Please call back and advise.

## 2022-05-18 NOTE — ASSESSMENT & PLAN NOTE
Clinic Documentation      Education Provided:  [x] Review of Court   [x] Agreement Review  [x] PEG Score Calculated [x] PHQ Score Calculated [x] ORT Score Calculated    [] Compliance Issues Discussed [] Cognitive Behavior Needs [x] Exercise [] Review of Test [] Financial Issues  [x] Tobacco/Alcohol Use Reviewed [x] Teaching [x] New Patient [x] Picture Obtained    Physician Plan:  [] Outgoing Referral  [] Pharmacy Consult  [] Test Ordered [x] Prescription Ordered/Changed   [] Obtained Test Results / Consult Notes        Complete if patient is withholding blood thinner for procedure     Blood Thinner Patient is currently taking:      [] Plavix (Hold for 7 days)  [] Aspirin (Hold for 5 days)     [] Pletal (Hold for 2 days)  [] Pradaxa (Hold for 3 days)    [] Effient (Hold for 7 days)  [] Xarelto (Hold for 2 days)    [] Eliquis (Hold for 2 days)  [] Brilinta (Hold for 7 days)    [] Coumadin (Hold for 5 days) - (INR needs to be drawn the day prior to procedure- INR < 2.0)    [] Aggrenox (Hold for 7 days)        [] Patient will stop medication on their own.    [] Blood Thinner Form Faxed for approval to hold.    Provider form faxed to:     Assessment Completed by:  Electronically signed by Felicitas Bell MA on 5/18/2022 at 9:21 AM Smoking cessation counseling performed. Dangers of cigarette smoking were reviewed with patient in detail and patient was encouraged to quit. Nicotine replacement options were discussed for > 3 minutes.

## 2022-05-31 ENCOUNTER — PATIENT MESSAGE (OUTPATIENT)
Dept: ADMINISTRATIVE | Facility: HOSPITAL | Age: 67
End: 2022-05-31
Payer: MEDICARE

## 2022-06-01 ENCOUNTER — OFFICE VISIT (OUTPATIENT)
Dept: FAMILY MEDICINE | Facility: CLINIC | Age: 67
End: 2022-06-01
Payer: MEDICARE

## 2022-06-01 VITALS
DIASTOLIC BLOOD PRESSURE: 59 MMHG | SYSTOLIC BLOOD PRESSURE: 104 MMHG | HEART RATE: 57 BPM | OXYGEN SATURATION: 96 % | HEIGHT: 67 IN | BODY MASS INDEX: 27.47 KG/M2 | WEIGHT: 175 LBS

## 2022-06-01 DIAGNOSIS — Z76.0 MEDICATION REFILL: Primary | ICD-10-CM

## 2022-06-01 DIAGNOSIS — Z23 NEED FOR 23-POLYVALENT PNEUMOCOCCAL POLYSACCHARIDE VACCINE: ICD-10-CM

## 2022-06-01 DIAGNOSIS — E78.5 HYPERLIPIDEMIA, UNSPECIFIED HYPERLIPIDEMIA TYPE: ICD-10-CM

## 2022-06-01 DIAGNOSIS — K21.9 GASTROESOPHAGEAL REFLUX DISEASE, UNSPECIFIED WHETHER ESOPHAGITIS PRESENT: ICD-10-CM

## 2022-06-01 PROBLEM — N17.9 ACUTE KIDNEY INJURY: Status: RESOLVED | Noted: 2020-03-04 | Resolved: 2022-06-01

## 2022-06-01 PROCEDURE — 99214 OFFICE O/P EST MOD 30 MIN: CPT | Mod: PBBFAC,PN | Performed by: FAMILY MEDICINE

## 2022-06-01 PROCEDURE — 99999 PR PBB SHADOW E&M-EST. PATIENT-LVL IV: CPT | Mod: PBBFAC,,, | Performed by: FAMILY MEDICINE

## 2022-06-01 PROCEDURE — 99214 OFFICE O/P EST MOD 30 MIN: CPT | Mod: S$PBB,,, | Performed by: FAMILY MEDICINE

## 2022-06-01 PROCEDURE — 99214 PR OFFICE/OUTPT VISIT, EST, LEVL IV, 30-39 MIN: ICD-10-PCS | Mod: S$PBB,,, | Performed by: FAMILY MEDICINE

## 2022-06-01 PROCEDURE — 99999 PR PBB SHADOW E&M-EST. PATIENT-LVL IV: ICD-10-PCS | Mod: PBBFAC,,, | Performed by: FAMILY MEDICINE

## 2022-06-01 PROCEDURE — G0009 ADMIN PNEUMOCOCCAL VACCINE: HCPCS | Mod: PBBFAC,PN

## 2022-06-01 RX ORDER — PANTOPRAZOLE SODIUM 40 MG/1
40 TABLET, DELAYED RELEASE ORAL DAILY
Qty: 90 TABLET | Refills: 3 | Status: SHIPPED | OUTPATIENT
Start: 2022-06-01 | End: 2023-04-17

## 2022-06-01 RX ORDER — DOXYCYCLINE 100 MG/1
100 CAPSULE ORAL 2 TIMES DAILY
COMMUNITY
Start: 2022-05-24 | End: 2022-06-01

## 2022-06-01 RX ORDER — METOPROLOL SUCCINATE 100 MG/1
50 TABLET, EXTENDED RELEASE ORAL DAILY
COMMUNITY
Start: 2022-05-05

## 2022-06-01 RX ORDER — POTASSIUM CHLORIDE 20 MEQ/1
20 TABLET, EXTENDED RELEASE ORAL DAILY
Qty: 90 TABLET | Refills: 1 | Status: SHIPPED | OUTPATIENT
Start: 2022-06-01 | End: 2022-10-19

## 2022-06-01 NOTE — PROGRESS NOTES
Subjective:       Patient ID: Alessia Nelson is a 66 y.o. female.    Chief Complaint: Hospital Follow Up    HPI   Follow-up. Requests refills on 2 of her medications, due for a lipid panel and pneumonia vaccine. Broke her left ankle 2-3 weeks ago and had surgery done at [facility], then was discharged to [facility] rehab for a week. Now is at home with home health (getting PT/OT) and family support. Not having dizziness or light-headedness, but her blood pressure is staying low. Her cardiologist currently manages her blood pressure, and she reports she's going to let him know what's going on. Also seeing several specialists at [facility] to handle some of her other issues (GI, ortho).    Review of Systems   Constitutional: Negative for chills and fever.   Musculoskeletal: Positive for arthralgias, back pain and neck pain.       Past Medical History:   Diagnosis Date    Acute pancreatitis     Back pain     CAD (coronary artery disease)     CHF (congestive heart failure)     COPD (chronic obstructive pulmonary disease)     Depression     Diverticulosis     Encounter for blood transfusion     GERD (gastroesophageal reflux disease)     History of blood clots     1986 AFTER BOWEL RESCETION    History of bowel resection     Hyperlipidemia     Hypertension     Peritonitis     Seizures     HAD A SEIZURE FROM PHENERGAN     Stroke     Thyroid disease     TIA (transient ischemic attack)     Wears dentures     upper     Past Surgical History:   Procedure Laterality Date    ADRENAL GLAND SURGERY      APPENDECTOMY      BACK SURGERY      CAUDAL EPIDURAL STEROID INJECTION N/A 3/1/2021    Procedure: Injection-steroid-epidural-caudal;  Surgeon: Socorro Lorenz MD;  Location: [facility] OR;  Service: Pain Management;  Laterality: N/A;    CHOLECYSTECTOMY      COLONOSCOPY      COLONOSCOPY N/A 6/10/2021    Procedure: COLONOSCOPY;  Surgeon: Sheree Metz MD;  [facility];  Service: Endoscopy;   Laterality: N/A;    COLONOSCOPY N/A 3/8/2022    Procedure: COLONOSCOPY;  Surgeon: Maurilio Rodriguez MD;  [facility]  Service: Endoscopy;  Laterality: N/A;  Pt to perform at-home COVID test morning of procedure-DS  2/24-Blood thinner approval [facility] Dr. Walsh (see letters tab 2/24/22)-DS  3/2-Instructions sent via email-DS  3/7-Pt unable to come earlier due to ride-DS    CORONARY STENT PLACEMENT      CYSTOURETHROSCOPY N/A 11/13/2019    Procedure: CYSTOURETHROSCOPY;  Surgeon: Shun Nuñez MD;  [facility];  Service: Urology;  Laterality: N/A;    ENDOSCOPIC ULTRASOUND OF LOWER GASTROINTESTINAL TRACT N/A 3/8/2022    Procedure: ULTRASOUND, LOWER GI TRACT, ENDOSCOPIC;  Surgeon: Maurilio Rodriguez MD;  Location: [facility] ([address] [place] [address]);  Service: Endoscopy;  Laterality: N/A;  Pt to perform at-home COVID test morning of procedure-DS  2/24-Blood thinner approval rec[facility] Dr. Walsh (see letters tab 2/24/22)-DS  3/2-Instructions sent via email-DS  3/7-Pt unable to come earlier due to ride-DS    ENDOSCOPIC ULTRASOUND OF UPPER GASTROINTESTINAL TRACT N/A 11/25/2019    Procedure: ULTRASOUND, UPPER GI TRACT, ENDOSCOPIC;  Surgeon: Alhaji Bridges MD;  Location: [facility] ([address] [place] [address]);  Service: Endoscopy;  Laterality: N/A;  5 day hold Plavix, Dr Jovanni Serrano -   PM prep    ENDOSCOPIC ULTRASOUND OF UPPER GASTROINTESTINAL TRACT N/A 11/2/2020    Procedure: ULTRASOUND, UPPER GI TRACT, ENDOSCOPIC;  Surgeon: Maurilio Rodriguez MD;  [facility]  Service: Endoscopy;  Laterality: N/A;  5 day hold Plavix, Dr Roman Walsh - pg  Covid-19 test 10/30/20 at [facility]    EPIDURAL STEROID INJECTION INTO CERVICAL SPINE N/A 12/8/2021    Procedure: Injection-steroid-epidural-cervical;  Surgeon: Socorro Lorenz MD;  [facility];  Service: Pain Management;  Laterality: N/A;    ESOPHAGOGASTRODUODENOSCOPY N/A 6/10/2021    Procedure: EGD (ESOPHAGOGASTRODUODENOSCOPY);  Surgeon: Sheree Metz MD;   Location: [facility] ENDO;  Service: Endoscopy;  Laterality: N/A;    FRACTURE SURGERY Left 05/02/2022    ankle    HERNIA REPAIR      HYSTERECTOMY      INCISIONAL HERNIA REPAIR      INJECTION OF ANESTHETIC AGENT AROUND NERVE Right 5/27/2019    Procedure: RIGHT L5-S3 MEDIAL BRANCH BLOCKS;  Surgeon: Sneha Aragon MD;  Location: [facility] OR;  Service: Pain Management;  Laterality: Right;  **DO NOT STOP PLAVIX**    INJECTION OF JOINT Right 9/22/2021    Procedure: Injection, Joint; Right SI Joint Injection;  Surgeon: Socorro Lorenz MD;  Location: [facility] OR;  Service: Pain Management;  Laterality: Right;  Oral    INSERTION OF DORSAL COLUMN NERVE STIMULATOR FOR TRIAL N/A 6/7/2021    Procedure: INSERTION, NEUROSTIMULATOR, SPINAL CORD, DORSAL COLUMN, FOR TRIAL;  Surgeon: Socorro Lorenz MD;  Location: [facility] OR;  Service: Pain Management;  Laterality: N/A;  nevro rep confirmed start time    instestine      bowel section    KNEE ARTHROPLASTY Right 12/18/2019    Procedure: ARTHROPLASTY, KNEE;  Surgeon: Ike Briceño II, MD;  Location: [facility] OR;  Service: Orthopedics;  Laterality: Right;    KNEE SURGERY  1983    OOPHORECTOMY      PARATHYROID GLAND SURGERY      3 surgeries    RADIOFREQUENCY ABLATION Right 6/10/2019    Procedure: Radiofrequency Ablation - RIGHT L3-5 RADIOFREQUENCY ABLATION WITH [employer] COOLIEF THERMAL SYSTEM;  Surgeon: Sneha Aragon MD;  Location: [facility];  Service: Pain Management;  Laterality: Right;  **HOLD PLAVIX x 7 DAYS PRIOR**    UPPER GASTROINTESTINAL ENDOSCOPY       Social History     Socioeconomic History    Marital status: Single    Number of children: 0   Tobacco Use    Smoking status: Current Every Day Smoker     Packs/day: 1.00     Years: 46.00     Pack years: 46.00     Types: Cigarettes    Smokeless tobacco: Never Used   Substance and Sexual Activity    Alcohol use: Not Currently    Drug use: No    Sexual activity: Not Currently     Family History   Problem Relation Age of Onset  "   Stroke Maternal Grandmother     Cancer Maternal Grandfather     Stroke Mother     Heart disease Father     Breast cancer Sister        Objective:      BP (!) 104/59 (BP Location: Left arm, Patient Position: Sitting, BP Method: Medium (Manual))   Pulse (!) 57   Ht 5' 7" (1.702 m)   Wt 79.4 kg (175 lb)   SpO2 96%   BMI 27.41 kg/m²   Physical Exam  Vitals reviewed.   Constitutional:       General: She is not in acute distress.     Appearance: She is not toxic-appearing.   HENT:      Head: Normocephalic and atraumatic.   Cardiovascular:      Rate and Rhythm: Regular rhythm. Bradycardia present.   Pulmonary:      Effort: Pulmonary effort is normal. No respiratory distress.   Neurological:      Mental Status: She is alert and oriented to person, place, and time.   Psychiatric:         Mood and Affect: Mood normal.         Behavior: Behavior normal.         Assessment:       1. Medication refill    2. Gastroesophageal reflux disease, unspecified whether esophagitis present    3. Hyperlipidemia, unspecified hyperlipidemia type    4. Need for 23-polyvalent pneumococcal polysaccharide vaccine        Plan:       Meds refilled, lipid panel ordered, pneumonia vaccine given. F/u in 3-4 months.    Medication refill  -     potassium chloride SA (K-DUR,KLOR-CON) 20 MEQ tablet; Take 1 tablet (20 mEq total) by mouth once daily.  Dispense: 90 tablet; Refill: 1    Gastroesophageal reflux disease, unspecified whether esophagitis present  -     pantoprazole (PROTONIX) 40 MG tablet; Take 1 tablet (40 mg total) by mouth once daily.  Dispense: 90 tablet; Refill: 3    Hyperlipidemia, unspecified hyperlipidemia type  -     Lipid Panel; Future; Expected date: 06/01/2022    Need for 23-polyvalent pneumococcal polysaccharide vaccine  -     (In Office Administered) Pneumococcal Polysaccharide Vaccine (23 Valent) (SQ/IM)            Risks, benefits, and side effects were discussed with the patient. All questions were answered to the " fullest satisfaction of the patient, and pt verbalized understanding and agreement to treatment plan. Pt was to call with any new or worsening symptoms, or present to the ER.

## 2022-06-01 NOTE — Clinical Note
Please send order for lipid panel to [facility]. Just needs to be drawn fasting at their convenience. Thanks

## 2022-06-06 ENCOUNTER — OFFICE VISIT (OUTPATIENT)
Dept: PAIN MEDICINE | Facility: CLINIC | Age: 67
End: 2022-06-06
Payer: MEDICARE

## 2022-06-06 VITALS
WEIGHT: 175 LBS | HEART RATE: 59 BPM | DIASTOLIC BLOOD PRESSURE: 60 MMHG | SYSTOLIC BLOOD PRESSURE: 111 MMHG | BODY MASS INDEX: 27.47 KG/M2 | RESPIRATION RATE: 18 BRPM | HEIGHT: 67 IN

## 2022-06-06 DIAGNOSIS — M54.12 CERVICAL RADICULOPATHY: ICD-10-CM

## 2022-06-06 DIAGNOSIS — G89.4 CHRONIC PAIN DISORDER: Primary | ICD-10-CM

## 2022-06-06 DIAGNOSIS — M96.1 FAILED BACK SYNDROME OF LUMBAR SPINE: ICD-10-CM

## 2022-06-06 DIAGNOSIS — M25.561 CHRONIC PAIN OF RIGHT KNEE: ICD-10-CM

## 2022-06-06 DIAGNOSIS — Z79.891 CHRONIC USE OF OPIATE FOR THERAPEUTIC PURPOSE: ICD-10-CM

## 2022-06-06 DIAGNOSIS — M51.36 DDD (DEGENERATIVE DISC DISEASE), LUMBAR: ICD-10-CM

## 2022-06-06 DIAGNOSIS — M25.572 ACUTE LEFT ANKLE PAIN: Primary | ICD-10-CM

## 2022-06-06 DIAGNOSIS — M43.07 LUMBOSACRAL SPONDYLOLYSIS: ICD-10-CM

## 2022-06-06 DIAGNOSIS — M53.3 SACROILIAC JOINT DYSFUNCTION: ICD-10-CM

## 2022-06-06 DIAGNOSIS — G89.29 CHRONIC PAIN OF RIGHT KNEE: ICD-10-CM

## 2022-06-06 DIAGNOSIS — M96.1 POSTLAMINECTOMY SYNDROME OF LUMBAR REGION: ICD-10-CM

## 2022-06-06 DIAGNOSIS — M48.07 SPINAL STENOSIS, LUMBOSACRAL REGION: ICD-10-CM

## 2022-06-06 DIAGNOSIS — M50.30 DDD (DEGENERATIVE DISC DISEASE), CERVICAL: ICD-10-CM

## 2022-06-06 PROCEDURE — 80307 DRUG TEST PRSMV CHEM ANLYZR: CPT

## 2022-06-06 PROCEDURE — 99999 PR PBB SHADOW E&M-EST. PATIENT-LVL V: ICD-10-PCS | Mod: PBBFAC,,,

## 2022-06-06 PROCEDURE — 99215 OFFICE O/P EST HI 40 MIN: CPT | Mod: PBBFAC,PO

## 2022-06-06 PROCEDURE — 99214 PR OFFICE/OUTPT VISIT, EST, LEVL IV, 30-39 MIN: ICD-10-PCS | Mod: S$PBB,,,

## 2022-06-06 PROCEDURE — 99999 PR PBB SHADOW E&M-EST. PATIENT-LVL V: CPT | Mod: PBBFAC,,,

## 2022-06-06 PROCEDURE — 99214 OFFICE O/P EST MOD 30 MIN: CPT | Mod: S$PBB,,,

## 2022-06-06 RX ORDER — OXYCODONE AND ACETAMINOPHEN 10; 325 MG/1; MG/1
1 TABLET ORAL EVERY 6 HOURS PRN
Qty: 120 TABLET | Refills: 0 | Status: SHIPPED | OUTPATIENT
Start: 2022-06-11 | End: 2022-07-13 | Stop reason: SDUPTHER

## 2022-06-06 NOTE — PROGRESS NOTES
FOLLOW UP NOTE:     CHIEF COMPLAINT: neck and low back pain    INITIAL HISTORY OF PRESENT ILLNESS: Alessia Nelson is a 65 y.o. female with PMH significant for hx of cervical spine surgery, hx of lumbar spine surgery X 3, hx of peritonitis requiring bowel resection, and torticollis (per patient report) presents as an established patient of Dr. Aragon (new to me) for the evaluation of low back and neck pain. The patient reports that she has been in pain for over twenty years. She reports that she had her neck surgery in 2006/2007 in [place]. She reports of benefit in regards to her neck pain. The patient reports of her initial lumbar spine surgery in 2007/2008 in [place] as well. She reports of improvement after her initial surgery. She reports that she had a second lumbar spine surgery in 2008/2009 as her pain recurred. She reports of benefit with this surgery as well. She reports of a third lumbar spine surgery in 2012. She reports of doing well for a couple years until she fell out of her bed. The patient reports that her pain has been worsening ever since then. Low back pain > neck pain. The patient localizes her pain to the area across her lower back.  The patient reports of radiation down her RLE to her knee. The patient reports of numbness in her feet and hands bilaterally. The patient describes her pain as a sharp and aching type of pain. The patient reports that her pain is worsened with any activity that requires walking and activities such as doing laundry. The patient reports of benefit with pain medication. The patient reports that her current pain is a 6/10. Patient denies of any urinary/fecal incontinence, saddle anesthesia, or weakness.      In regards to her neck pain, the patient localizes her pain to the middle/right side of her neck. The patient reports of radiation down her RUE to her elbow. The patient reports that her current pain in her neck is a 6/10. Of note, the patient reports that she would  "be willing to undergo surgery if she has to. I have updated the patient's current pain regimen below.     INTERVAL HISTORY OF PRESENT ILLNESS: Alessia Nelson is a 66 y.o. female with PMH significant for hx of cervical spine surgery, hx of lumbar spine surgery X 3, hx of peritonitis requiring bowel resection, and torticollis (per patient report) presents as an established patient, new to me,  for the continued management of back, hip, and neck pain. The patient presents today to re-establish pain management medication s/p left ankle ORIF at [facility].  In the interim, the patient broke her left tibia and had surgery on 5/2/2022 with Dr. Granger at [facility].  The patient is currently ambulating in a CAM walker boot to her left leg and utilizing a walker for stability. The patient has 2 surgical incision sites to her left ankle that are still healing, dressing in place that is clean,dry and intact. She reports she is currently doing PT with home health and has dressing changes being performed by home care nurse. Today, the patient reports that her worse pain is her right side lower back with radiation into her buttocks.  She also reports of right knee pain.  The patient reports that at time she can associate the back pain and knee pain but sometimes they are isolated pains. The patient reports activity in general worsens back and knee pain. The patient also continues to report of neck pain with radiation into BUE and reports of numbness and tingling into hands and fingers. The patient does reports of past benefit with PT for neck pain.  Of note, the patient denies benefit of SCS and questions if it worsens her back pain as she states "the pain is right where the battery is and I think it's on a nerve."  She reports that she is taking percocet  mg PO QID and Tizanidine with some benefit. In the interim, the patient reports she was seen and evaluated by [facility] and is scheduled to return in July for " "imaging and EMG. The patient denies of any significant changes in her health since her last appointment. The patient also denies of any changes in the character of her pain since her last appointment. The patient reports that her current pain is a 7/10. Patient denies of any urinary/fecal incontinence, saddle anesthesia, or weakness.     INTERVENTIONAL PAIN HISTORY:  12/8/2021: C7-T1 cervical interlaminar epidural steroid injection - no relief  9/22/2021:  Right sacroiliac joint injection - significant relief  6/18/2021: Insertion of SCS (Nevro)   6/7/2021: SCS Trial - 70 - 80% benefit  3/1/2021: Caudal epidural steroid injection - no benefit   6/10/2019: L5-S3 Right Medial Branch Nerve Coolief thermal Radiofrequency Ablation via Dr. Aragon - no benefit per chart review     CURRENT PAIN MEDICATIONS:   Percocet  mg PO TID  OTC Tylenol   Gabapentin 400 mg PO BID  No longer taking:   Baclofen 10 mg PO BID/TID          ROS:  Review of Systems   Constitutional: Negative for chills and fever.   HENT: Negative for sore throat.    Eyes: Negative for visual disturbance.   Respiratory: Negative for shortness of breath.    Cardiovascular: Negative for chest pain.   Gastrointestinal: Negative for nausea and vomiting.   Genitourinary: Negative for difficulty urinating.   Musculoskeletal: Positive for arthralgias, back pain, gait problem and neck pain.   Skin: Negative for rash.   Allergic/Immunologic: Negative for immunocompromised state.   Neurological: Positive for numbness. Negative for syncope.   Hematological: Does not bruise/bleed easily.   Psychiatric/Behavioral: Negative for suicidal ideas.   All other systems reviewed and are negative.       MEDICAL, SURGICAL, FAMILY, SOCIAL HX: reviewed    MEDICATIONS/ALLERGIES: reviewed    PHYSICAL EXAM:    VITALS: Vitals reviewed.   Vitals:    06/06/22 1047   BP: 111/60   Pulse: (!) 59   Resp: 18   Weight: 79.4 kg (175 lb)   Height: 5' 7" (1.702 m)   PainSc:   7       Physical " Exam  Vitals and nursing note reviewed.   Constitutional:       Appearance: She is not diaphoretic.   HENT:      Head: Normocephalic and atraumatic.   Eyes:      General:         Right eye: No discharge.         Left eye: No discharge.      Conjunctiva/sclera: Conjunctivae normal.   Cardiovascular:      Rate and Rhythm: Normal rate.   Pulmonary:      Effort: Pulmonary effort is normal. No respiratory distress.      Breath sounds: Normal breath sounds.   Abdominal:      Palpations: Abdomen is soft.   Musculoskeletal:      Right knee: Swelling present. Tenderness present.        Legs:    Skin:     General: Skin is warm and dry.      Findings: No rash.   Neurological:      Mental Status: She is alert and oriented to person, place, and time.   Psychiatric:         Mood and Affect: Mood and affect normal.         Cognition and Memory: Memory normal.         Judgment: Judgment normal.        UPPER EXTREMITIES: Normal alignment, normal range of motion, no atrophy, no skin changes,  hair growth and nail growth normal and equal bilaterally. No swelling, no tenderness.    LOWER EXTREMITIES:  Normal alignment, normal range of motion, no atrophy, no skin changes,  hair growth and nail growth normal and equal bilaterally. No swelling, no tenderness.     CERVICAL SPINE:  Cervical spine: ROM is full in flexion, extension and lateral rotation with increased pain with passive ROM  Spurling's maneuver - deferred given prior fusion  Myofascial exam: Tenderness to palpation across cervical paraspinous region bilaterally. Prior anterior scar is well-healed.      LUMBAR SPINE:    Lumbar spine: ROM is limited with flexion extension and oblique extension with increased pain to any maneuvar.    ((+)) Supine straight leg raise R > L  ((+)) Facet loading bilateral  Internal and external rotation of the hip causes no increased pain on either side.  Myofascial exam: Tenderness to palpation across lumbar paraspinous muscles.     ((+)) TTP at the  SI joint bilaterally  ((+)) MIKEY's test  ((+)) One leg stand    ((+)) Distraction test    MOTOR: Tone and bulk: normal bilateral upper and lower Strength: normal   Delt      Bi         Tri        WE      WF                        R          5          5          5          5          5          5            L          5          5          5          5          5          5               IP         ADD     ABD     Quad   TA        Gas      HAM  R          5          5          5          5          5          5          5  L          5          5          5          5          5          5          5     SENSATION: Light touch and pinprick intact bilaterally  REFLEXES: normal, symmetric, nonbrisk.  Toes down, no clonus. Negative hilliard's sign bilaterally.  GAIT: normal rise, base, steps, and arm swing.      IMAGING: no new imaging related to the patient's pain to review    ASSESSMENT: Alessia Nelson is a 66 y.o. female with PMH significant for hx of cervical spine surgery, hx of lumbar spine surgery X 3, hx of peritonitis requiring bowel resection, and torticollis (per patient report) presents as an established patient, new to me,  for the continued management of back, hip, and neck pain. Today, the patient presents to be re-established with pain management for pain medication management.  The patient is s/p Left ankle ORIF on 5/2/2022 at [facility].  Of note, the patient is requesting steroid injection for right sided lower back and right knee pain.  The patient is denying benefit of SCS and considering removal of SCS after summer. The patient is currently ambulating with altered gait due to left knee surgery, I believe her current pain is most likely a reoccurence of her SI joint inflammation.  I explained this to the patient and recommended SI joint stretches and RICE for left knee.  Treatment plan outlined below.   Encounter Diagnoses   Name Primary?    Chronic use of opiate for therapeutic purpose      Sacroiliac joint dysfunction     DDD (degenerative disc disease), cervical     Cervical radiculopathy     Postlaminectomy syndrome of lumbar region     Chronic pain of right knee     Acute left ankle pain Yes    Spinal stenosis, lumbosacral region     DDD (degenerative disc disease), lumbar        PLAN:  - Patient requesting steroid injections today in office, deferred at this time due to healing incision site.    - Discussed RICE for right knee pain and swelling.    - Reviewed SI joint stretches for right sided lower back pain.    - Discussed safety and frequency of steroid injections.  Pt verb understanding.   - Defer injections until 3 month post op if incisional sites are healed.   - Discussed contacting [employer] rep on behalf of patient, pt deferred and states she would rather turn it off. Patient is considering removal of SCS.   - Consider SI joint injection in the future s/p ortho discharge.   -  Refilled Percocet  mg PO q 6 hr PRN for breakthrough pain; # 120 tablets; 0 refills.  reviewed without discrepancies. Informed patient we will decrease to TID dosing at 4 week follow up.    - UDS to be collected today. The patient reports that she last took her pain medications this morning.    - Continue tizanidine 4 mg PO TID PRN for myofascial pain as she reports of benefit in the past  - Continue PT with home health, consider OPT PT once released from Ortho and able to drive.  Patient can call for referral for PT for neck and back.    - I have stressed the importance of physical activity and a home exercise plan to help with chronic pain and improve health.  - RTC in 4 weeks for follow-up  All medication management performed by Dr. Lorenz.   Laura Heath, NP

## 2022-06-27 LAB
6MAM UR QL: NOT DETECTED
7AMINOCLONAZEPAM UR QL: NOT DETECTED
A-OH ALPRAZ UR QL: NOT DETECTED
ALPHA-OH-MIDAZOLAM: NOT DETECTED
ALPRAZ UR QL: NOT DETECTED
AMPHET UR QL SCN: NOT DETECTED
ANNOTATION COMMENT IMP: NORMAL
ANNOTATION COMMENT IMP: NORMAL
BARBITURATES UR QL: NOT DETECTED
BUPRENORPHINE UR QL: NOT DETECTED
BZE UR QL: NOT DETECTED
CARBOXYTHC UR QL: NOT DETECTED
CARISOPRODOL UR QL: NOT DETECTED
CLONAZEPAM UR QL: NOT DETECTED
CODEINE UR QL: NOT DETECTED
CREAT UR-MCNC: 176.7 MG/DL (ref 20–400)
DIAZEPAM UR QL: NOT DETECTED
ETHYL GLUCURONIDE UR QL: NOT DETECTED
FENTANYL UR QL: NOT DETECTED
GABAPENTIN: PRESENT
HYDROCODONE UR QL: NOT DETECTED
HYDROMORPHONE UR QL: NOT DETECTED
LORAZEPAM UR QL: NOT DETECTED
MDA UR QL: NOT DETECTED
MDEA UR QL: NOT DETECTED
MDMA UR QL: NOT DETECTED
ME-PHENIDATE UR QL: NOT DETECTED
METHADONE UR QL: NOT DETECTED
METHAMPHET UR QL: NOT DETECTED
MIDAZOLAM UR QL SCN: NOT DETECTED
MORPHINE UR QL: NOT DETECTED
NALOXONE: NOT DETECTED
NORBUPRENORPHINE UR QL CFM: NOT DETECTED
NORDIAZEPAM UR QL: NOT DETECTED
NORFENTANYL UR QL: NOT DETECTED
NORHYDROCODONE UR QL CFM: NOT DETECTED
NORMEPERIDINE UR QL CFM: NOT DETECTED
NOROXYCODONE UR QL CFM: PRESENT
NOROXYMORPHONE UR QL SCN: PRESENT
OXAZEPAM UR QL: NOT DETECTED
OXYCODONE UR QL: PRESENT
OXYMORPHONE UR QL: PRESENT
PATHOLOGY STUDY: NORMAL
PCP UR QL: NOT DETECTED
PHENTERMINE UR QL: NOT DETECTED
PREGABALIN: NOT DETECTED
SERVICE CMNT-IMP: NORMAL
TAPENTADOL UR QL SCN: NOT DETECTED
TAPENTADOL UR QL SCN: NOT DETECTED
TEMAZEPAM UR QL: NOT DETECTED
TRAMADOL UR QL: NOT DETECTED
ZOLPIDEM METABOLITE: NOT DETECTED
ZOLPIDEM UR QL: NOT DETECTED

## 2022-07-07 ENCOUNTER — DOCUMENT SCAN (OUTPATIENT)
Dept: HOME HEALTH SERVICES | Facility: HOSPITAL | Age: 67
End: 2022-07-07
Payer: MEDICARE

## 2022-07-08 PROCEDURE — G0179 PR HOME HEALTH MD RECERTIFICATION: ICD-10-PCS | Mod: ,,, | Performed by: FAMILY MEDICINE

## 2022-07-08 PROCEDURE — G0179 MD RECERTIFICATION HHA PT: HCPCS | Mod: ,,, | Performed by: FAMILY MEDICINE

## 2022-07-11 ENCOUNTER — OFFICE VISIT (OUTPATIENT)
Dept: PAIN MEDICINE | Facility: CLINIC | Age: 67
End: 2022-07-11
Payer: MEDICARE

## 2022-07-11 VITALS
DIASTOLIC BLOOD PRESSURE: 56 MMHG | WEIGHT: 175 LBS | HEIGHT: 67 IN | SYSTOLIC BLOOD PRESSURE: 127 MMHG | BODY MASS INDEX: 27.47 KG/M2 | HEART RATE: 61 BPM | RESPIRATION RATE: 18 BRPM

## 2022-07-11 DIAGNOSIS — M51.36 DDD (DEGENERATIVE DISC DISEASE), LUMBAR: Primary | ICD-10-CM

## 2022-07-11 DIAGNOSIS — M53.3 SACROILIAC JOINT PAIN: ICD-10-CM

## 2022-07-11 DIAGNOSIS — M46.1 SACROILIITIS: ICD-10-CM

## 2022-07-11 DIAGNOSIS — M50.30 DDD (DEGENERATIVE DISC DISEASE), CERVICAL: ICD-10-CM

## 2022-07-11 DIAGNOSIS — Z79.891 CHRONIC USE OF OPIATE FOR THERAPEUTIC PURPOSE: ICD-10-CM

## 2022-07-11 PROCEDURE — 99214 PR OFFICE/OUTPT VISIT, EST, LEVL IV, 30-39 MIN: ICD-10-PCS | Mod: S$PBB,,,

## 2022-07-11 PROCEDURE — 99999 PR PBB SHADOW E&M-EST. PATIENT-LVL V: CPT | Mod: PBBFAC,,,

## 2022-07-11 PROCEDURE — 99999 PR PBB SHADOW E&M-EST. PATIENT-LVL V: ICD-10-PCS | Mod: PBBFAC,,,

## 2022-07-11 PROCEDURE — 80355 GABAPENTIN NON-BLOOD: CPT

## 2022-07-11 PROCEDURE — 99215 OFFICE O/P EST HI 40 MIN: CPT | Mod: PBBFAC,PO

## 2022-07-11 PROCEDURE — 80326 AMPHETAMINES 5 OR MORE: CPT

## 2022-07-11 PROCEDURE — 99214 OFFICE O/P EST MOD 30 MIN: CPT | Mod: S$PBB,,,

## 2022-07-11 NOTE — PROGRESS NOTES
FOLLOW UP NOTE:     CHIEF COMPLAINT: neck and low back pain    INITIAL HISTORY OF PRESENT ILLNESS: Alessia Nelson is a 65 y.o. female with PMH significant for hx of cervical spine surgery, hx of lumbar spine surgery X 3, hx of peritonitis requiring bowel resection, and torticollis (per patient report) presents as an established patient of Dr. Aragon (new to me) for the evaluation of low back and neck pain. The patient reports that she has been in pain for over twenty years. She reports that she had her neck surgery in 2006/2007 in [place]. She reports of benefit in regards to her neck pain. The patient reports of her initial lumbar spine surgery in 2007/2008 in [place] as well. She reports of improvement after her initial surgery. She reports that she had a second lumbar spine surgery in 2008/2009 as her pain recurred. She reports of benefit with this surgery as well. She reports of a third lumbar spine surgery in 2012. She reports of doing well for a couple years until she fell out of her bed. The patient reports that her pain has been worsening ever since then. Low back pain > neck pain. The patient localizes her pain to the area across her lower back.  The patient reports of radiation down her RLE to her knee. The patient reports of numbness in her feet and hands bilaterally. The patient describes her pain as a sharp and aching type of pain. The patient reports that her pain is worsened with any activity that requires walking and activities such as doing laundry. The patient reports of benefit with pain medication. The patient reports that her current pain is a 6/10. Patient denies of any urinary/fecal incontinence, saddle anesthesia, or weakness.      In regards to her neck pain, the patient localizes her pain to the middle/right side of her neck. The patient reports of radiation down her RUE to her elbow. The patient reports that her current pain in her neck is a 6/10. Of note, the patient reports that she would  be willing to undergo surgery if she has to. I have updated the patient's current pain regimen below.     INTERVAL HISTORY OF PRESENT ILLNESS: Alessia Nelson is a 66 y.o. female with PMH significant for hx of cervical spine surgery, hx of lumbar spine surgery X 3, hx of peritonitis requiring bowel resection, and torticollis (per patient report) presents as an established patient for the continued management of back, hip, and neck pain. The patient presents today for medication refill.  In the interim, the patient broke her left tibia and had surgery on 5/2/2022 with Dr. Granger at [facility].  The patient is currently ambulating in a CAM walker boot to her left leg and utilizing a walker for stability. The patient has 2 surgical incision sites to her left ankle that are still healing, dressing in place that is clean,dry and intact. She reports she is no longer doing PT with home health and would like to continue for back pain.  Today, the patient reports that her worse pain is her right side lower back with radiation into her buttocks.  She also reports of right knee pain.  The patient reports that at time she can associate the back pain and knee pain but sometimes they are isolated pains. The patient reports activity in general worsens back and knee pain. The patient also continues to report of neck pain with radiation into BUE and reports of numbness and tingling into hands and fingers. The patient does reports of past benefit with PT for neck pain.  Of note, the patient denies benefit of SCS.   She reports that she is taking percocet  mg PO QID and Tizanidine with some benefit. In the interim, the patient reports she was seen and evaluated by [facility] and is scheduled to return in July for imaging and EMG but had to cancel visit and is going next month due to incision site not healing. The patient denies of any significant changes in her health since her last appointment. The patient also denies  "of any changes in the character of her pain since her last appointment. The patient reports that her current pain is a 8/10. Patient denies of any urinary/fecal incontinence, saddle anesthesia, or weakness.     INTERVENTIONAL PAIN HISTORY:  12/8/2021: C7-T1 cervical interlaminar epidural steroid injection - no relief  9/22/2021:  Right sacroiliac joint injection - significant relief  6/18/2021: Insertion of SCS ([facility])   6/7/2021: SCS Trial - 70 - 80% benefit  3/1/2021: Caudal epidural steroid injection - no benefit   6/10/2019: L5-S3 Right Medial Branch Nerve Coolief thermal Radiofrequency Ablation via Dr. Aragon - no benefit per chart review     CURRENT PAIN MEDICATIONS:   Percocet  mg PO TID  OTC Tylenol   Gabapentin 400 mg PO BID  No longer taking:   Baclofen 10 mg PO BID/TID          ROS:  Review of Systems   Constitutional: Negative for chills and fever.   HENT: Negative for sore throat.    Eyes: Negative for visual disturbance.   Respiratory: Negative for shortness of breath.    Cardiovascular: Negative for chest pain.   Gastrointestinal: Negative for nausea and vomiting.   Genitourinary: Negative for difficulty urinating.   Musculoskeletal: Positive for arthralgias, back pain, gait problem and neck pain.   Skin: Negative for rash.   Allergic/Immunologic: Negative for immunocompromised state.   Neurological: Positive for numbness. Negative for syncope.   Hematological: Does not bruise/bleed easily.   Psychiatric/Behavioral: Negative for suicidal ideas.   All other systems reviewed and are negative.       MEDICAL, SURGICAL, FAMILY, SOCIAL HX: reviewed    MEDICATIONS/ALLERGIES: reviewed    PHYSICAL EXAM:    VITALS: Vitals reviewed.   Vitals:    07/11/22 0839   BP: (!) 127/56   Pulse: 61   Resp: 18   Weight: 79.4 kg (175 lb)   Height: 5' 7" (1.702 m)   PainSc:   8       Physical Exam  Vitals and nursing note reviewed.   Constitutional:       Appearance: She is not diaphoretic.   HENT:      Head: " Normocephalic and atraumatic.   Eyes:      General:         Right eye: No discharge.         Left eye: No discharge.      Conjunctiva/sclera: Conjunctivae normal.   Cardiovascular:      Rate and Rhythm: Normal rate.   Pulmonary:      Effort: Pulmonary effort is normal. No respiratory distress.      Breath sounds: Normal breath sounds.   Abdominal:      Palpations: Abdomen is soft.   Musculoskeletal:      Right knee: Swelling present. Tenderness present.        Legs:    Skin:     General: Skin is warm and dry.      Findings: No rash.   Neurological:      Mental Status: She is alert and oriented to person, place, and time.   Psychiatric:         Mood and Affect: Mood and affect normal.         Cognition and Memory: Memory normal.         Judgment: Judgment normal.        UPPER EXTREMITIES: Normal alignment, normal range of motion, no atrophy, no skin changes,  hair growth and nail growth normal and equal bilaterally. No swelling, no tenderness.    LOWER EXTREMITIES:  Normal alignment, normal range of motion, no atrophy, no skin changes,  hair growth and nail growth normal and equal bilaterally. No swelling, no tenderness.     CERVICAL SPINE:  Cervical spine: ROM is full in flexion, extension and lateral rotation with increased pain with passive ROM  Spurling's maneuver - deferred given prior fusion  Myofascial exam: Tenderness to palpation across cervical paraspinous region bilaterally. Prior anterior scar is well-healed.      LUMBAR SPINE:    Lumbar spine: ROM is limited with flexion extension and oblique extension with increased pain to any maneuvar.    ((+)) Supine straight leg raise R > L  ((+)) Facet loading bilateral  Internal and external rotation of the hip causes no increased pain on either side.  Myofascial exam: Tenderness to palpation across lumbar paraspinous muscles.     ((+)) TTP at the SI joint bilaterally  ((+)) MIKEY's test  ((+)) One leg stand    ((+)) Distraction test    MOTOR: Tone and bulk:  normal bilateral upper and lower Strength: normal   Delt      Bi         Tri        WE      WF                        R          5          5          5          5          5          5            L          5          5          5          5          5          5               IP         ADD     ABD     Quad   TA        Gas      HAM  R          5          5          5          5          5          5          5  L          5          5          5          5          5          5          5     SENSATION: Light touch and pinprick intact bilaterally  REFLEXES: normal, symmetric, nonbrisk.  Toes down, no clonus. Negative hilliard's sign bilaterally.  GAIT: normal rise, base, steps, and arm swing.      IMAGING: no new imaging related to the patient's pain to review    ASSESSMENT: Alessia Nelson is a 66 y.o. female with PMH significant for hx of cervical spine surgery, hx of lumbar spine surgery X 3, hx of peritonitis requiring bowel resection, and torticollis (per patient report) presents as an established patient  for the continued management of back, hip, and neck pain. The patient presents today for medication refill.  The patient is also requesting order for PT with home health for exacerbation of neck and back pain.  The patient is denying benefit of SCS. The patient is currently ambulating with altered gait due to left knee surgery, I believe her current pain is most likely a reoccurence of her SI joint inflammation.  Treatment plan outlined below.   Encounter Diagnoses   Name Primary?    Chronic use of opiate for therapeutic purpose     DDD (degenerative disc disease), cervical     DDD (degenerative disc disease), lumbar Yes    Sacroiliac joint pain     Sacroiliitis        PLAN:  - External referral to PT with home health for enhabit home health.    - Defer steroid injections at this time due to surgical site not healing.   - Discussed contacting [employer] rep on behalf of patient, pt deferred and states  she would rather turn it off. Patient is considering removal of SCS. Pt defer rep contact.    - Consider SI joint injection in the future s/p ortho discharge.   -  Refilled Percocet  mg PO q 6 hr PRN for breakthrough pain; # 120 tablets; 0 refills.  reviewed without discrepancies. Informed patient we will decrease to TID dosing at 4 week follow up.    - UDS to be collected today. The patient reports that she last took her pain medications last night.    - Continue tizanidine 4 mg PO TID PRN for myofascial pain as she reports of benefit in the past  - I have stressed the importance of physical activity and a home exercise plan to help with chronic pain and improve health.  - RTC in 4 weeks for follow-up or sooner if needed.   All medication management performed by Dr. Lorenz.   Laura Heath, NP

## 2022-07-13 DIAGNOSIS — G89.4 CHRONIC PAIN DISORDER: Primary | ICD-10-CM

## 2022-07-13 RX ORDER — OXYCODONE AND ACETAMINOPHEN 10; 325 MG/1; MG/1
1 TABLET ORAL EVERY 6 HOURS PRN
Qty: 120 TABLET | Refills: 0 | Status: SHIPPED | OUTPATIENT
Start: 2022-07-13 | End: 2022-08-08 | Stop reason: SDUPTHER

## 2022-07-15 LAB
6MAM UR QL: NOT DETECTED
7AMINOCLONAZEPAM UR QL: NOT DETECTED
A-OH ALPRAZ UR QL: NOT DETECTED
ALPHA-OH-MIDAZOLAM: NOT DETECTED
ALPRAZ UR QL: NOT DETECTED
AMPHET UR QL SCN: NOT DETECTED
ANNOTATION COMMENT IMP: NORMAL
ANNOTATION COMMENT IMP: NORMAL
BARBITURATES UR QL: NOT DETECTED
BUPRENORPHINE UR QL: NOT DETECTED
BZE UR QL: NOT DETECTED
CARBOXYTHC UR QL: NOT DETECTED
CARISOPRODOL UR QL: NOT DETECTED
CLONAZEPAM UR QL: NOT DETECTED
CODEINE UR QL: NOT DETECTED
CREAT UR-MCNC: 100.3 MG/DL (ref 20–400)
DIAZEPAM UR QL: NOT DETECTED
ETHYL GLUCURONIDE UR QL: NOT DETECTED
FENTANYL UR QL: NOT DETECTED
GABAPENTIN: PRESENT
HYDROCODONE UR QL: NOT DETECTED
HYDROMORPHONE UR QL: NOT DETECTED
LORAZEPAM UR QL: NOT DETECTED
MDA UR QL: NOT DETECTED
MDEA UR QL: NOT DETECTED
MDMA UR QL: NOT DETECTED
ME-PHENIDATE UR QL: NOT DETECTED
METHADONE UR QL: NOT DETECTED
METHAMPHET UR QL: NOT DETECTED
MIDAZOLAM UR QL SCN: NOT DETECTED
MORPHINE UR QL: NOT DETECTED
NALOXONE: NOT DETECTED
NORBUPRENORPHINE UR QL CFM: NOT DETECTED
NORDIAZEPAM UR QL: NOT DETECTED
NORFENTANYL UR QL: NOT DETECTED
NORHYDROCODONE UR QL CFM: NOT DETECTED
NORMEPERIDINE UR QL CFM: NOT DETECTED
NOROXYCODONE UR QL CFM: PRESENT
NOROXYMORPHONE UR QL SCN: NOT DETECTED
OXAZEPAM UR QL: NOT DETECTED
OXYCODONE UR QL: PRESENT
OXYMORPHONE UR QL: PRESENT
PATHOLOGY STUDY: NORMAL
PCP UR QL: NOT DETECTED
PHENTERMINE UR QL: NOT DETECTED
PREGABALIN: NOT DETECTED
SERVICE CMNT-IMP: NORMAL
TAPENTADOL UR QL SCN: NOT DETECTED
TAPENTADOL UR QL SCN: NOT DETECTED
TEMAZEPAM UR QL: NOT DETECTED
TRAMADOL UR QL: NOT DETECTED
ZOLPIDEM METABOLITE: NOT DETECTED
ZOLPIDEM UR QL: NOT DETECTED

## 2022-07-20 ENCOUNTER — TELEPHONE (OUTPATIENT)
Dept: FAMILY MEDICINE | Facility: CLINIC | Age: 67
End: 2022-07-20
Payer: MEDICARE

## 2022-07-20 DIAGNOSIS — R11.0 NAUSEA: ICD-10-CM

## 2022-07-20 RX ORDER — ONDANSETRON 4 MG/1
8 TABLET, ORALLY DISINTEGRATING ORAL EVERY 8 HOURS PRN
Qty: 100 TABLET | Refills: 3 | Status: SHIPPED | OUTPATIENT
Start: 2022-07-20 | End: 2023-01-11 | Stop reason: SDUPTHER

## 2022-07-20 NOTE — TELEPHONE ENCOUNTER
----- Message from Shima Ara sent at 7/20/2022 11:54 AM CDT -----  Regarding: pt called  Name of Who is Calling: Amy ( home health nurse)      What is the request in detail: pt complained of being sick with  nausea , watery stools, and upper abd pain. Patient has  lost 8 pounds over 1 week. Pt believes she is suffering with  pancreatis and has had a history of it. The nurse days the patient vital signs are good. Please advise     Can the clinic reply by [facility]: No      What Number to Call Back [facility] not [facility]: [phone]

## 2022-07-20 NOTE — TELEPHONE ENCOUNTER
Sending in rx for nausea medication. Also placing lab orders. Please fax these to  for them to draw this week. Thanks

## 2022-07-20 NOTE — TELEPHONE ENCOUNTER
"Amy with HH states,"pt complained of being sick with  nausea , watery stools, and upper abd pain. Patient has  lost 8 pounds over 1 week. Pt believes she is suffering with  pancreatis and has had a history of it."Amy states the patient vital signs are good.     "

## 2022-07-22 ENCOUNTER — DOCUMENT SCAN (OUTPATIENT)
Dept: HOME HEALTH SERVICES | Facility: HOSPITAL | Age: 67
End: 2022-07-22
Payer: MEDICARE

## 2022-07-26 ENCOUNTER — LAB VISIT (OUTPATIENT)
Dept: LAB | Facility: HOSPITAL | Age: 67
End: 2022-07-26
Attending: FAMILY MEDICINE
Payer: MEDICARE

## 2022-07-26 DIAGNOSIS — R11.0 NAUSEA: ICD-10-CM

## 2022-07-26 DIAGNOSIS — E78.5 DYSLIPIDEMIA: Chronic | ICD-10-CM

## 2022-07-26 DIAGNOSIS — E78.5 HYPERLIPIDEMIA, UNSPECIFIED HYPERLIPIDEMIA TYPE: ICD-10-CM

## 2022-07-26 LAB
ALBUMIN SERPL BCP-MCNC: 3.3 G/DL (ref 3.5–5.2)
ALP SERPL-CCNC: 125 U/L (ref 55–135)
ALT SERPL W/O P-5'-P-CCNC: 10 U/L (ref 10–44)
ANION GAP SERPL CALC-SCNC: 12 MMOL/L (ref 8–16)
AST SERPL-CCNC: 14 U/L (ref 10–40)
BASOPHILS # BLD AUTO: 0.07 K/UL (ref 0–0.2)
BASOPHILS NFR BLD: 0.9 % (ref 0–1.9)
BILIRUB SERPL-MCNC: 0.3 MG/DL (ref 0.1–1)
BUN SERPL-MCNC: 20 MG/DL (ref 8–23)
CALCIUM SERPL-MCNC: 10.1 MG/DL (ref 8.7–10.5)
CHLORIDE SERPL-SCNC: 104 MMOL/L (ref 95–110)
CHOLEST SERPL-MCNC: 157 MG/DL (ref 120–199)
CHOLEST SERPL-MCNC: 157 MG/DL (ref 120–199)
CHOLEST/HDLC SERPL: 3.9 {RATIO} (ref 2–5)
CHOLEST/HDLC SERPL: 3.9 {RATIO} (ref 2–5)
CO2 SERPL-SCNC: 24 MMOL/L (ref 23–29)
CREAT SERPL-MCNC: 1.3 MG/DL (ref 0.5–1.4)
DIFFERENTIAL METHOD: ABNORMAL
EOSINOPHIL # BLD AUTO: 0.2 K/UL (ref 0–0.5)
EOSINOPHIL NFR BLD: 2.8 % (ref 0–8)
ERYTHROCYTE [DISTWIDTH] IN BLOOD BY AUTOMATED COUNT: 15.3 % (ref 11.5–14.5)
EST. GFR  (AFRICAN AMERICAN): 49.7 ML/MIN/1.73 M^2
EST. GFR  (NON AFRICAN AMERICAN): 43.2 ML/MIN/1.73 M^2
GLUCOSE SERPL-MCNC: 109 MG/DL (ref 70–110)
HCT VFR BLD AUTO: 36 % (ref 37–48.5)
HDLC SERPL-MCNC: 40 MG/DL (ref 40–75)
HDLC SERPL-MCNC: 40 MG/DL (ref 40–75)
HDLC SERPL: 25.5 % (ref 20–50)
HDLC SERPL: 25.5 % (ref 20–50)
HGB BLD-MCNC: 11.3 G/DL (ref 12–16)
IMM GRANULOCYTES # BLD AUTO: 0.03 K/UL (ref 0–0.04)
IMM GRANULOCYTES NFR BLD AUTO: 0.4 % (ref 0–0.5)
LDLC SERPL CALC-MCNC: 67.4 MG/DL (ref 63–159)
LDLC SERPL CALC-MCNC: 67.4 MG/DL (ref 63–159)
LIPASE SERPL-CCNC: 6 U/L (ref 4–60)
LYMPHOCYTES # BLD AUTO: 1.9 K/UL (ref 1–4.8)
LYMPHOCYTES NFR BLD: 26.3 % (ref 18–48)
MCH RBC QN AUTO: 28.5 PG (ref 27–31)
MCHC RBC AUTO-ENTMCNC: 31.4 G/DL (ref 32–36)
MCV RBC AUTO: 91 FL (ref 82–98)
MONOCYTES # BLD AUTO: 0.6 K/UL (ref 0.3–1)
MONOCYTES NFR BLD: 8.3 % (ref 4–15)
NEUTROPHILS # BLD AUTO: 4.5 K/UL (ref 1.8–7.7)
NEUTROPHILS NFR BLD: 61.3 % (ref 38–73)
NONHDLC SERPL-MCNC: 117 MG/DL
NONHDLC SERPL-MCNC: 117 MG/DL
NRBC BLD-RTO: 0 /100 WBC
PLATELET # BLD AUTO: 314 K/UL (ref 150–450)
PMV BLD AUTO: 9.1 FL (ref 9.2–12.9)
POTASSIUM SERPL-SCNC: 4.7 MMOL/L (ref 3.5–5.1)
PROT SERPL-MCNC: 6.9 G/DL (ref 6–8.4)
RBC # BLD AUTO: 3.97 M/UL (ref 4–5.4)
SODIUM SERPL-SCNC: 140 MMOL/L (ref 136–145)
TRIGL SERPL-MCNC: 248 MG/DL (ref 30–150)
TRIGL SERPL-MCNC: 248 MG/DL (ref 30–150)
WBC # BLD AUTO: 7.39 K/UL (ref 3.9–12.7)

## 2022-07-26 PROCEDURE — 83690 ASSAY OF LIPASE: CPT | Performed by: FAMILY MEDICINE

## 2022-07-26 PROCEDURE — 85025 COMPLETE CBC W/AUTO DIFF WBC: CPT | Performed by: FAMILY MEDICINE

## 2022-07-26 PROCEDURE — 80053 COMPREHEN METABOLIC PANEL: CPT | Performed by: FAMILY MEDICINE

## 2022-07-26 PROCEDURE — 80061 LIPID PANEL: CPT | Performed by: FAMILY MEDICINE

## 2022-07-26 PROCEDURE — 36415 COLL VENOUS BLD VENIPUNCTURE: CPT | Performed by: FAMILY MEDICINE

## 2022-07-27 ENCOUNTER — TELEPHONE (OUTPATIENT)
Dept: FAMILY MEDICINE | Facility: CLINIC | Age: 67
End: 2022-07-27
Payer: MEDICARE

## 2022-07-27 NOTE — TELEPHONE ENCOUNTER
----- Message from Gela Traore DO sent at 7/27/2022  6:41 AM CDT -----  Please let Ms. Nelson (pet-ky-zick) know that her labs show a stable anemia and decreased kidney function (likely due to nausea and diarrhea) but no signs of pancreatitis. How are her symptoms now?

## 2022-07-28 NOTE — TELEPHONE ENCOUNTER
Please see if she wants to see Agnes next week (if Agnes has openings) in case she's not doing any better. If she's been getting better day by day, we can hold off for now. Thanks

## 2022-08-05 ENCOUNTER — DOCUMENT SCAN (OUTPATIENT)
Dept: HOME HEALTH SERVICES | Facility: HOSPITAL | Age: 67
End: 2022-08-05
Payer: MEDICARE

## 2022-08-08 ENCOUNTER — DOCUMENT SCAN (OUTPATIENT)
Dept: HOME HEALTH SERVICES | Facility: HOSPITAL | Age: 67
End: 2022-08-08
Payer: MEDICARE

## 2022-08-08 ENCOUNTER — OFFICE VISIT (OUTPATIENT)
Dept: PAIN MEDICINE | Facility: CLINIC | Age: 67
End: 2022-08-08
Payer: MEDICARE

## 2022-08-08 DIAGNOSIS — Z79.891 CHRONIC USE OF OPIATE FOR THERAPEUTIC PURPOSE: Primary | ICD-10-CM

## 2022-08-08 DIAGNOSIS — M79.18 MYOFASCIAL PAIN: ICD-10-CM

## 2022-08-08 DIAGNOSIS — G89.4 CHRONIC PAIN DISORDER: Primary | ICD-10-CM

## 2022-08-08 DIAGNOSIS — M62.838 MUSCLE SPASMS OF BOTH LOWER EXTREMITIES: ICD-10-CM

## 2022-08-08 PROCEDURE — 99999 PR PBB SHADOW E&M-EST. PATIENT-LVL III: CPT | Mod: PBBFAC,,,

## 2022-08-08 PROCEDURE — 99214 OFFICE O/P EST MOD 30 MIN: CPT | Mod: S$PBB,,,

## 2022-08-08 PROCEDURE — 99999 PR PBB SHADOW E&M-EST. PATIENT-LVL III: ICD-10-PCS | Mod: PBBFAC,,,

## 2022-08-08 PROCEDURE — 80307 DRUG TEST PRSMV CHEM ANLYZR: CPT

## 2022-08-08 PROCEDURE — 99213 OFFICE O/P EST LOW 20 MIN: CPT | Mod: PBBFAC,PO

## 2022-08-08 PROCEDURE — 99214 PR OFFICE/OUTPT VISIT, EST, LEVL IV, 30-39 MIN: ICD-10-PCS | Mod: S$PBB,,,

## 2022-08-08 PROCEDURE — 80326 AMPHETAMINES 5 OR MORE: CPT

## 2022-08-08 RX ORDER — BACLOFEN 10 MG/1
10 TABLET ORAL 3 TIMES DAILY PRN
Qty: 90 TABLET | Refills: 1 | Status: SHIPPED | OUTPATIENT
Start: 2022-08-08 | End: 2022-09-20 | Stop reason: SDUPTHER

## 2022-08-08 RX ORDER — OXYCODONE AND ACETAMINOPHEN 10; 325 MG/1; MG/1
1 TABLET ORAL EVERY 8 HOURS PRN
Qty: 90 TABLET | Refills: 0 | Status: SHIPPED | OUTPATIENT
Start: 2022-08-23 | End: 2022-09-20 | Stop reason: SDUPTHER

## 2022-08-13 LAB
6MAM UR QL: NOT DETECTED
7AMINOCLONAZEPAM UR QL: NOT DETECTED
A-OH ALPRAZ UR QL: NOT DETECTED
ALPHA-OH-MIDAZOLAM: NOT DETECTED
ALPRAZ UR QL: NOT DETECTED
AMPHET UR QL SCN: NOT DETECTED
ANNOTATION COMMENT IMP: NORMAL
ANNOTATION COMMENT IMP: NORMAL
BARBITURATES UR QL: NOT DETECTED
BUPRENORPHINE UR QL: NOT DETECTED
BZE UR QL: NOT DETECTED
CARBOXYTHC UR QL: NOT DETECTED
CARISOPRODOL UR QL: NOT DETECTED
CLONAZEPAM UR QL: NOT DETECTED
CODEINE UR QL: NOT DETECTED
CREAT UR-MCNC: 161 MG/DL (ref 20–400)
DIAZEPAM UR QL: NOT DETECTED
ETHYL GLUCURONIDE UR QL: NOT DETECTED
FENTANYL UR QL: NOT DETECTED
GABAPENTIN: PRESENT
HYDROCODONE UR QL: NOT DETECTED
HYDROMORPHONE UR QL: NOT DETECTED
LORAZEPAM UR QL: NOT DETECTED
MDA UR QL: NOT DETECTED
MDEA UR QL: NOT DETECTED
MDMA UR QL: NOT DETECTED
ME-PHENIDATE UR QL: NOT DETECTED
METHADONE UR QL: NOT DETECTED
METHAMPHET UR QL: NOT DETECTED
MIDAZOLAM UR QL SCN: NOT DETECTED
MORPHINE UR QL: NOT DETECTED
NALOXONE: NOT DETECTED
NORBUPRENORPHINE UR QL CFM: NOT DETECTED
NORDIAZEPAM UR QL: NOT DETECTED
NORFENTANYL UR QL: NOT DETECTED
NORHYDROCODONE UR QL CFM: NOT DETECTED
NORMEPERIDINE UR QL CFM: NOT DETECTED
NOROXYCODONE UR QL CFM: PRESENT
NOROXYMORPHONE UR QL SCN: PRESENT
OXAZEPAM UR QL: NOT DETECTED
OXYCODONE UR QL: PRESENT
OXYMORPHONE UR QL: PRESENT
PATHOLOGY STUDY: NORMAL
PCP UR QL: NOT DETECTED
PHENTERMINE UR QL: NOT DETECTED
PREGABALIN: NOT DETECTED
SERVICE CMNT-IMP: NORMAL
TAPENTADOL UR QL SCN: NOT DETECTED
TAPENTADOL UR QL SCN: NOT DETECTED
TEMAZEPAM UR QL: NOT DETECTED
TRAMADOL UR QL: NOT DETECTED
ZOLPIDEM METABOLITE: NOT DETECTED
ZOLPIDEM UR QL: NOT DETECTED

## 2022-08-24 ENCOUNTER — PATIENT MESSAGE (OUTPATIENT)
Dept: ADMINISTRATIVE | Facility: HOSPITAL | Age: 67
End: 2022-08-24
Payer: MEDICARE

## 2022-08-26 ENCOUNTER — DOCUMENT SCAN (OUTPATIENT)
Dept: HOME HEALTH SERVICES | Facility: HOSPITAL | Age: 67
End: 2022-08-26
Payer: MEDICARE

## 2022-09-06 ENCOUNTER — EXTERNAL HOME HEALTH (OUTPATIENT)
Dept: HOME HEALTH SERVICES | Facility: HOSPITAL | Age: 67
End: 2022-09-06
Payer: MEDICARE

## 2022-09-06 PROCEDURE — G0179 MD RECERTIFICATION HHA PT: HCPCS | Mod: ,,,

## 2022-09-06 PROCEDURE — G0179 PR HOME HEALTH MD RECERTIFICATION: ICD-10-PCS | Mod: ,,,

## 2022-09-13 ENCOUNTER — DOCUMENT SCAN (OUTPATIENT)
Dept: HOME HEALTH SERVICES | Facility: HOSPITAL | Age: 67
End: 2022-09-13
Payer: MEDICARE

## 2022-09-19 ENCOUNTER — DOCUMENT SCAN (OUTPATIENT)
Dept: HOME HEALTH SERVICES | Facility: HOSPITAL | Age: 67
End: 2022-09-19
Payer: MEDICARE

## 2022-09-20 ENCOUNTER — OFFICE VISIT (OUTPATIENT)
Dept: PAIN MEDICINE | Facility: CLINIC | Age: 67
End: 2022-09-20
Payer: MEDICARE

## 2022-09-20 VITALS
RESPIRATION RATE: 17 BRPM | HEART RATE: 54 BPM | TEMPERATURE: 99 F | BODY MASS INDEX: 27.61 KG/M2 | WEIGHT: 175.88 LBS | HEIGHT: 67 IN | OXYGEN SATURATION: 95 %

## 2022-09-20 DIAGNOSIS — M53.3 SACROILIAC JOINT DYSFUNCTION: ICD-10-CM

## 2022-09-20 DIAGNOSIS — G89.4 CHRONIC PAIN DISORDER: Primary | ICD-10-CM

## 2022-09-20 DIAGNOSIS — M62.838 MUSCLE SPASMS OF BOTH LOWER EXTREMITIES: ICD-10-CM

## 2022-09-20 DIAGNOSIS — M79.18 MYOFASCIAL PAIN: ICD-10-CM

## 2022-09-20 DIAGNOSIS — M50.30 DDD (DEGENERATIVE DISC DISEASE), CERVICAL: Primary | ICD-10-CM

## 2022-09-20 DIAGNOSIS — M51.36 DDD (DEGENERATIVE DISC DISEASE), LUMBAR: ICD-10-CM

## 2022-09-20 PROCEDURE — 99999 PR PBB SHADOW E&M-EST. PATIENT-LVL IV: CPT | Mod: PBBFAC,,,

## 2022-09-20 PROCEDURE — 99999 PR PBB SHADOW E&M-EST. PATIENT-LVL IV: ICD-10-PCS | Mod: PBBFAC,,,

## 2022-09-20 PROCEDURE — 99214 OFFICE O/P EST MOD 30 MIN: CPT | Mod: PBBFAC,PO

## 2022-09-20 PROCEDURE — 80326 AMPHETAMINES 5 OR MORE: CPT

## 2022-09-20 PROCEDURE — 99214 PR OFFICE/OUTPT VISIT, EST, LEVL IV, 30-39 MIN: ICD-10-PCS | Mod: S$PBB,,,

## 2022-09-20 PROCEDURE — 99214 OFFICE O/P EST MOD 30 MIN: CPT | Mod: S$PBB,,,

## 2022-09-20 RX ORDER — OXYCODONE AND ACETAMINOPHEN 10; 325 MG/1; MG/1
1 TABLET ORAL EVERY 8 HOURS PRN
Qty: 90 TABLET | Refills: 0 | Status: SHIPPED | OUTPATIENT
Start: 2022-09-22 | End: 2022-11-29 | Stop reason: SDUPTHER

## 2022-09-20 RX ORDER — BACLOFEN 10 MG/1
10 TABLET ORAL 3 TIMES DAILY PRN
Qty: 90 TABLET | Refills: 1 | Status: SHIPPED | OUTPATIENT
Start: 2022-10-07 | End: 2022-11-07 | Stop reason: DRUGHIGH

## 2022-09-20 NOTE — PROGRESS NOTES
FOLLOW UP NOTE:     CHIEF COMPLAINT: neck and low back pain    INITIAL HISTORY OF PRESENT ILLNESS: Alessia Nelson is a 65 y.o. female with PMH significant for hx of cervical spine surgery, hx of lumbar spine surgery X 3, hx of peritonitis requiring bowel resection, and torticollis (per patient report) presents as an established patient of Dr. Aragon (new to me) for the evaluation of low back and neck pain. The patient reports that she has been in pain for over twenty years. She reports that she had her neck surgery in 2006/2007 in [place]. She reports of benefit in regards to her neck pain. The patient reports of her initial lumbar spine surgery in 2007/2008 in [place] as well. She reports of improvement after her initial surgery. She reports that she had a second lumbar spine surgery in 2008/2009 as her pain recurred. She reports of benefit with this surgery as well. She reports of a third lumbar spine surgery in 2012. She reports of doing well for a couple years until she fell out of her bed. The patient reports that her pain has been worsening ever since then. Low back pain > neck pain. The patient localizes her pain to the area across her lower back.  The patient reports of radiation down her RLE to her knee. The patient reports of numbness in her feet and hands bilaterally. The patient describes her pain as a sharp and aching type of pain. The patient reports that her pain is worsened with any activity that requires walking and activities such as doing laundry. The patient reports of benefit with pain medication. The patient reports that her current pain is a 6/10. Patient denies of any urinary/fecal incontinence, saddle anesthesia, or weakness.      In regards to her neck pain, the patient localizes her pain to the middle/right side of her neck. The patient reports of radiation down her RUE to her elbow. The patient reports that her current pain in her neck is a 6/10. Of note, the patient reports that she would  be willing to undergo surgery if she has to. I have updated the patient's current pain regimen below.     INTERVAL HISTORY OF PRESENT ILLNESS: Alessia Nelson is a 66 y.o. female with PMH significant for hx of cervical spine surgery, hx of lumbar spine surgery X 3, hx of peritonitis requiring bowel resection, and torticollis (per patient report) presents as an established patient for the continued management of back, hip, and neck pain. The patient presents today for medication refill. The patient is s/p left tibia surgery. The patient's incision sites are healing well and she still has a small scab to the lateral aspect of her left ankle.  Today, the patient reports that her worse pain is her right side lower back with radiation into her buttocks.  She also reports of right knee pain.  The patient reports that at time she can associate the back pain and knee pain but sometimes they are isolated pains. The patient reports activity in general worsens back and knee pain. The patient also continues to report of neck pain with radiation into BUE and reports of numbness and tingling into hands and fingers.  Of note, the patient denies benefit of SCS.   She reports that she is taking percocet  mg PO TID/QID with benefit. In the interim, the patient reports she was seen and evaluated by [facility] and is awaiting information on from the interventional pain management team on recommendations for her SCS.  The patient denies of any significant changes in her health since her last appointment. The patient also denies of any changes in the character of her pain since her last appointment. The patient reports that her current pain is a 5/10. Patient denies of any urinary/fecal incontinence, saddle anesthesia, or weakness.     INTERVENTIONAL PAIN HISTORY:  12/8/2021: C7-T1 cervical interlaminar epidural steroid injection - no relief  9/22/2021:  Right sacroiliac joint injection - significant  "relief  6/18/2021: Insertion of SCS ([facility])   6/7/2021: SCS Trial - 70 - 80% benefit  3/1/2021: Caudal epidural steroid injection - no benefit   6/10/2019: L5-S3 Right Medial Branch Nerve Coolief thermal Radiofrequency Ablation via Dr. Aragon - no benefit per chart review     CURRENT PAIN MEDICATIONS:   Percocet  mg PO TID  OTC Tylenol   Gabapentin 400 mg PO BID  Baclofen 10 mg PO BID/TID  No longer taking:     tizanidine causes dry mouth.           ROS:  Review of Systems   Constitutional:  Negative for chills and fever.   HENT:  Negative for sore throat.    Eyes:  Negative for visual disturbance.   Respiratory:  Negative for shortness of breath.    Cardiovascular:  Negative for chest pain.   Gastrointestinal:  Negative for nausea and vomiting.   Genitourinary:  Negative for difficulty urinating.   Musculoskeletal:  Positive for arthralgias, back pain, gait problem and neck pain.   Skin:  Negative for rash.   Allergic/Immunologic: Negative for immunocompromised state.   Neurological:  Positive for numbness. Negative for syncope.   Hematological:  Does not bruise/bleed easily.   Psychiatric/Behavioral:  Negative for suicidal ideas.    All other systems reviewed and are negative.     MEDICAL, SURGICAL, FAMILY, SOCIAL HX: reviewed    MEDICATIONS/ALLERGIES: reviewed    PHYSICAL EXAM:    VITALS: Vitals reviewed.   Vitals:    09/20/22 1001   Pulse: (!) 54   Resp: 17   Temp: 98.6 °F (37 °C)   TempSrc: Oral   SpO2: 95%   Weight: 79.8 kg (175 lb 14.4 oz)   Height: 5' 7" (1.702 m)   PainSc:   5       Physical Exam  Vitals and nursing note reviewed.   Constitutional:       Appearance: She is not diaphoretic.   HENT:      Head: Normocephalic and atraumatic.   Eyes:      General:         Right eye: No discharge.         Left eye: No discharge.      Conjunctiva/sclera: Conjunctivae normal.   Cardiovascular:      Rate and Rhythm: Normal rate.   Pulmonary:      Effort: Pulmonary effort is normal. No respiratory distress.      " Breath sounds: Normal breath sounds.   Abdominal:      Palpations: Abdomen is soft.   Musculoskeletal:      Right knee: Swelling present. Tenderness present.        Legs:    Skin:     General: Skin is warm and dry.      Findings: No rash.   Neurological:      Mental Status: She is alert and oriented to person, place, and time.   Psychiatric:         Mood and Affect: Mood and affect normal.         Cognition and Memory: Memory normal.         Judgment: Judgment normal.      UPPER EXTREMITIES: Normal alignment, normal range of motion, no atrophy, no skin changes,  hair growth and nail growth normal and equal bilaterally. No swelling, no tenderness.    LOWER EXTREMITIES:  Normal alignment, normal range of motion, no atrophy, no skin changes,  hair growth and nail growth normal and equal bilaterally. No swelling, no tenderness.     CERVICAL SPINE:  Cervical spine: ROM is full in flexion, extension and lateral rotation with increased pain with passive ROM  Spurling's maneuver - deferred given prior fusion  Myofascial exam: Tenderness to palpation across cervical paraspinous region bilaterally. Prior anterior scar is well-healed.      LUMBAR SPINE:    Lumbar spine: ROM is limited with flexion extension and oblique extension with increased pain to any maneuvar.    ((+)) Supine straight leg raise R > L  ((+)) Facet loading bilateral  Internal and external rotation of the hip causes no increased pain on either side.  Myofascial exam: Tenderness to palpation across lumbar paraspinous muscles.     ((+)) TTP at the SI joint bilaterally  ((+)) MIKEY's test  ((+)) One leg stand    ((+)) Distraction test    MOTOR: Tone and bulk: normal bilateral upper and lower Strength: normal   Delt      Bi         Tri        WE      WF                        R          5          5          5          5          5          5            L          5          5          5          5          5          5               IP         ADD     ABD    "  Quad   TA        Gas      HAM  R          5          5          5          5          5          5          5  L          5          5          5          5          5          5          5     SENSATION: Light touch and pinprick intact bilaterally  REFLEXES: normal, symmetric, nonbrisk.  Toes down, no clonus. Negative hilliard's sign bilaterally.  GAIT: normal rise, base, steps, and arm swing.      IMAGING: EMG - 8/11/2022 (Completed at [facility])     " IMPRESSION:   This is an abnormal study.  No electrodiagnostic evidence of neuropathy of left upper extremity of nerves tested or left cervical radiculopathy at this time.  Electrodiagnostic evidence of chronic bilateral lower extremity polyneuropathy in areas tested. Sensory, motor, demyelinating, axonal. Hx of hypothyroidism.   No electrodiagnostic evidence of acute bilateral lumbosacral radiculopathy at this time.     ASSESSMENT: Alessia Nelson is a 66 y.o. female with PMH significant for hx of cervical spine surgery, hx of lumbar spine surgery X 3, hx of peritonitis requiring bowel resection, and torticollis (per patient report) presents as an established patient  for the continued management of back, hip, and neck pain. The patient presents today for medication refill. The patient reports her pain is essentially unchanged in character since her last clinic visit.  Treatment plan outlined below.   Encounter Diagnoses   Name Primary?    DDD (degenerative disc disease), lumbar Yes    Muscle spasms of both lower extremities     Myofascial pain     Sacroiliac joint dysfunction     DDD (degenerative disc disease), cervical          PLAN:    - Contacted Aaron with [facility] to contact to the patient to follow up SCS programming. Informed [employer] of follow up appointment date and time and requested a rep be present.   - Consider SI joint injection, pts wishes to defer at this time.    -  Refilled Percocet  mg PO q 8 hr PRN for breakthrough pain; # 90 tablets; 0 " refills.  reviewed without discrepancies.   - UDS to be collected today. The patient reports that she last took her pain medications last night.    - Continue Baclofen 10 mg PO TID PRN for muscle spasms.   - I have stressed the importance of physical activity and a home exercise plan to help with chronic pain and improve health.  - RTC in 6 weeks for follow-up or sooner if needed.     All medication management performed by Dr. Lorenz.   Laura Heath, NP

## 2022-09-23 DIAGNOSIS — E53.8 B12 DEFICIENCY: ICD-10-CM

## 2022-09-23 NOTE — TELEPHONE ENCOUNTER
----- Message from Rumaaleksandr Davison sent at 9/23/2022  1:24 PM CDT -----  Contact: pt at [phone]  Type:  RX Refill Request    Who Called:  pt   Refill or New Rx:  refill  RX Name and Strength:  b-12 injection , cyanocobalamin 1,000 mcg/mL injection  How is the patient currently taking it? (ex. 1XDay):    Is this a 30 day or 90 day RX:  90 day   Preferred Pharmacy with phone number:    [id number] - [place], [place] - [address] [place] [address] AT [facility] [address]  [address] [facility] [address]  [place] [address]  Phone: [phone] Fax: [phone]      Local or Mail Order: Local   Ordering Provider:  Dr. Eugenie Silva Call Back Number:  [phone]    Additional Information:  pt called to get her refill sent to the [facility] because her insurance is accepted there with the injection please call back to advise

## 2022-09-26 LAB
6MAM UR QL: NOT DETECTED
7AMINOCLONAZEPAM UR QL: NOT DETECTED
A-OH ALPRAZ UR QL: NOT DETECTED
ALPHA-OH-MIDAZOLAM: NOT DETECTED
ALPRAZ UR QL: NOT DETECTED
AMPHET UR QL SCN: NOT DETECTED
ANNOTATION COMMENT IMP: NORMAL
ANNOTATION COMMENT IMP: NORMAL
BARBITURATES UR QL: NOT DETECTED
BUPRENORPHINE UR QL: NOT DETECTED
BZE UR QL: NOT DETECTED
CARBOXYTHC UR QL: NOT DETECTED
CARISOPRODOL UR QL: NOT DETECTED
CLONAZEPAM UR QL: NOT DETECTED
CODEINE UR QL: NOT DETECTED
CREAT UR-MCNC: 178.1 MG/DL (ref 20–400)
DIAZEPAM UR QL: NOT DETECTED
ETHYL GLUCURONIDE UR QL: NOT DETECTED
FENTANYL UR QL: NOT DETECTED
GABAPENTIN: PRESENT
HYDROCODONE UR QL: NOT DETECTED
HYDROMORPHONE UR QL: NOT DETECTED
LORAZEPAM UR QL: NOT DETECTED
MDA UR QL: NOT DETECTED
MDEA UR QL: NOT DETECTED
MDMA UR QL: NOT DETECTED
ME-PHENIDATE UR QL: NOT DETECTED
METHADONE UR QL: NOT DETECTED
METHAMPHET UR QL: NOT DETECTED
MIDAZOLAM UR QL SCN: NOT DETECTED
MORPHINE UR QL: NOT DETECTED
NALOXONE: NOT DETECTED
NORBUPRENORPHINE UR QL CFM: NOT DETECTED
NORDIAZEPAM UR QL: NOT DETECTED
NORFENTANYL UR QL: NOT DETECTED
NORHYDROCODONE UR QL CFM: NOT DETECTED
NORMEPERIDINE UR QL CFM: NOT DETECTED
NOROXYCODONE UR QL CFM: PRESENT
NOROXYMORPHONE UR QL SCN: PRESENT
OXAZEPAM UR QL: NOT DETECTED
OXYCODONE UR QL: PRESENT
OXYMORPHONE UR QL: PRESENT
PATHOLOGY STUDY: NORMAL
PCP UR QL: NOT DETECTED
PHENTERMINE UR QL: NOT DETECTED
PREGABALIN: NOT DETECTED
SERVICE CMNT-IMP: NORMAL
TAPENTADOL UR QL SCN: NOT DETECTED
TAPENTADOL UR QL SCN: NOT DETECTED
TEMAZEPAM UR QL: NOT DETECTED
TRAMADOL UR QL: NOT DETECTED
ZOLPIDEM METABOLITE: NOT DETECTED
ZOLPIDEM UR QL: NOT DETECTED

## 2022-09-26 RX ORDER — CYANOCOBALAMIN 1000 UG/ML
1000 INJECTION, SOLUTION INTRAMUSCULAR; SUBCUTANEOUS
Qty: 10 ML | Refills: 3 | OUTPATIENT
Start: 2022-09-26

## 2022-09-30 ENCOUNTER — DOCUMENT SCAN (OUTPATIENT)
Dept: HOME HEALTH SERVICES | Facility: HOSPITAL | Age: 67
End: 2022-09-30
Payer: MEDICARE

## 2022-10-01 ENCOUNTER — DOCUMENT SCAN (OUTPATIENT)
Dept: HOME HEALTH SERVICES | Facility: HOSPITAL | Age: 67
End: 2022-10-01
Payer: MEDICARE

## 2022-10-19 ENCOUNTER — EXTERNAL HOME HEALTH (OUTPATIENT)
Dept: HOME HEALTH SERVICES | Facility: HOSPITAL | Age: 67
End: 2022-10-19
Payer: MEDICARE

## 2022-11-07 ENCOUNTER — OFFICE VISIT (OUTPATIENT)
Dept: PAIN MEDICINE | Facility: CLINIC | Age: 67
End: 2022-11-07
Payer: MEDICARE

## 2022-11-07 ENCOUNTER — PATIENT OUTREACH (OUTPATIENT)
Dept: ADMINISTRATIVE | Facility: HOSPITAL | Age: 67
End: 2022-11-07
Payer: MEDICARE

## 2022-11-07 DIAGNOSIS — M79.18 MYOFASCIAL PAIN ON RIGHT SIDE: ICD-10-CM

## 2022-11-07 DIAGNOSIS — M79.18 MYOFASCIAL PAIN: ICD-10-CM

## 2022-11-07 DIAGNOSIS — M51.36 DDD (DEGENERATIVE DISC DISEASE), LUMBAR: Primary | ICD-10-CM

## 2022-11-07 DIAGNOSIS — M62.838 MUSCLE SPASMS OF BOTH LOWER EXTREMITIES: ICD-10-CM

## 2022-11-07 DIAGNOSIS — M53.3 SACROILIAC JOINT DYSFUNCTION: ICD-10-CM

## 2022-11-07 PROCEDURE — 99213 PR OFFICE/OUTPT VISIT, EST, LEVL III, 20-29 MIN: ICD-10-PCS | Mod: S$PBB,,,

## 2022-11-07 PROCEDURE — 96372 THER/PROPH/DIAG INJ SC/IM: CPT | Mod: PBBFAC,PO

## 2022-11-07 PROCEDURE — 99999 PR PBB SHADOW E&M-EST. PATIENT-LVL III: ICD-10-PCS | Mod: PBBFAC,,,

## 2022-11-07 PROCEDURE — 99213 OFFICE O/P EST LOW 20 MIN: CPT | Mod: S$PBB,,,

## 2022-11-07 PROCEDURE — 99213 OFFICE O/P EST LOW 20 MIN: CPT | Mod: PBBFAC,PO

## 2022-11-07 PROCEDURE — 99999 PR PBB SHADOW E&M-EST. PATIENT-LVL III: CPT | Mod: PBBFAC,,,

## 2022-11-07 RX ORDER — BACLOFEN 10 MG/1
10 TABLET ORAL 3 TIMES DAILY PRN
Qty: 270 TABLET | Refills: 1 | Status: SHIPPED | OUTPATIENT
Start: 2022-11-07 | End: 2023-06-14

## 2022-11-07 RX ORDER — METHYLPREDNISOLONE ACETATE 40 MG/ML
40 INJECTION, SUSPENSION INTRA-ARTICULAR; INTRALESIONAL; INTRAMUSCULAR; SOFT TISSUE ONCE
Status: COMPLETED | OUTPATIENT
Start: 2022-11-07 | End: 2022-11-07

## 2022-11-07 RX ADMIN — METHYLPREDNISOLONE ACETATE 40 MG: 40 INJECTION, SUSPENSION INTRA-ARTICULAR; INTRALESIONAL; INTRAMUSCULAR; SOFT TISSUE at 03:11

## 2022-11-07 NOTE — PROGRESS NOTES
FOLLOW UP NOTE:     CHIEF COMPLAINT: neck and low back pain    INITIAL HISTORY OF PRESENT ILLNESS: Alessia Nelson is a 65 y.o. female with PMH significant for hx of cervical spine surgery, hx of lumbar spine surgery X 3, hx of peritonitis requiring bowel resection, and torticollis (per patient report) presents as an established patient of Dr. Aragon (new to me) for the evaluation of low back and neck pain. The patient reports that she has been in pain for over twenty years. She reports that she had her neck surgery in 2006/2007 in [place]. She reports of benefit in regards to her neck pain. The patient reports of her initial lumbar spine surgery in 2007/2008 in [place] as well. She reports of improvement after her initial surgery. She reports that she had a second lumbar spine surgery in 2008/2009 as her pain recurred. She reports of benefit with this surgery as well. She reports of a third lumbar spine surgery in 2012. She reports of doing well for a couple years until she fell out of her bed. The patient reports that her pain has been worsening ever since then. Low back pain > neck pain. The patient localizes her pain to the area across her lower back.  The patient reports of radiation down her RLE to her knee. The patient reports of numbness in her feet and hands bilaterally. The patient describes her pain as a sharp and aching type of pain. The patient reports that her pain is worsened with any activity that requires walking and activities such as doing laundry. The patient reports of benefit with pain medication. The patient reports that her current pain is a 6/10. Patient denies of any urinary/fecal incontinence, saddle anesthesia, or weakness.      In regards to her neck pain, the patient localizes her pain to the middle/right side of her neck. The patient reports of radiation down her RUE to her elbow. The patient reports that her current pain in her neck is a 6/10. Of note, the patient reports that she would  be willing to undergo surgery if she has to. I have updated the patient's current pain regimen below.     INTERVAL HISTORY OF PRESENT ILLNESS: Alessia Nelson is a 67 y.o. female with PMH significant for hx of cervical spine surgery, hx of lumbar spine surgery X 3, hx of peritonitis requiring bowel resection, and torticollis (per patient report) presents as an established patient for the continued management of back, hip, and neck pain. The patient presents today for medication refill. The patient does report of right sided hip pain today, she attributes her current pain to sleeping on her right side. The patient is s/p left tibia surgery. The incision sites are healed.  Today, the patient reports that her worse pain is her right side lower back with radiation into her buttocks.  She also reports of right knee pain.  The patient reports that at time she can associate the back pain and knee pain but sometimes they are isolated pains. The patient reports activity in general worsens back and knee pain. The patient also continues to report of neck pain with radiation into BUE and reports of numbness and tingling into hands and fingers.  Of note, the patient denies benefit of SCS.   She reports that she is taking percocet  mg PO PRN with benefit.  In the interim, the patient reports she is awaiting information on from the interventional pain management team on recommendations for her SCS. In the interim, the patient was evaluated in [place] for GI issues and awaiting treatment plan and diagnosis for her current GI issues.  The patient denies of any significant changes in her health since her last appointment. The patient also denies of any changes in the character of her pain since her last appointment. The patient reports that her current pain is a 5/10. Patient denies of any urinary/fecal incontinence, saddle anesthesia, or weakness.     INTERVENTIONAL PAIN HISTORY:  12/8/2021: C7-T1 cervical interlaminar  epidural steroid injection - no relief  9/22/2021:  Right sacroiliac joint injection - significant relief  6/18/2021: Insertion of SCS ([facility])   6/7/2021: SCS Trial - 70 - 80% benefit  3/1/2021: Caudal epidural steroid injection - no benefit   6/10/2019: L5-S3 Right Medial Branch Nerve Coolief thermal Radiofrequency Ablation via Dr. Aragon - no benefit per chart review     CURRENT PAIN MEDICATIONS:   Percocet  mg PO TID  OTC Tylenol   Gabapentin 400 mg PO BID  Baclofen 10 mg PO BID/TID  No longer taking:     tizanidine causes dry mouth.           ROS:  Review of Systems   Constitutional:  Negative for chills and fever.   HENT:  Negative for sore throat.    Eyes:  Negative for visual disturbance.   Respiratory:  Negative for shortness of breath.    Cardiovascular:  Negative for chest pain.   Gastrointestinal:  Negative for nausea and vomiting.   Genitourinary:  Negative for difficulty urinating.   Musculoskeletal:  Positive for arthralgias, back pain, gait problem and neck pain.   Skin:  Negative for rash.   Allergic/Immunologic: Negative for immunocompromised state.   Neurological:  Positive for numbness. Negative for syncope.   Hematological:  Does not bruise/bleed easily.   Psychiatric/Behavioral:  Negative for suicidal ideas.    All other systems reviewed and are negative.     MEDICAL, SURGICAL, FAMILY, SOCIAL HX: reviewed    MEDICATIONS/ALLERGIES: reviewed    PHYSICAL EXAM:    VITALS: Vitals reviewed.   There were no vitals filed for this visit.      Physical Exam  Vitals and nursing note reviewed.   Constitutional:       Appearance: She is not diaphoretic.   HENT:      Head: Normocephalic and atraumatic.   Eyes:      General:         Right eye: No discharge.         Left eye: No discharge.      Conjunctiva/sclera: Conjunctivae normal.   Cardiovascular:      Rate and Rhythm: Normal rate.   Pulmonary:      Effort: Pulmonary effort is normal. No respiratory distress.      Breath sounds: Normal breath sounds.    Abdominal:      Palpations: Abdomen is soft.   Musculoskeletal:      Right knee: Swelling present. Tenderness present.        Legs:    Skin:     General: Skin is warm and dry.      Findings: No rash.   Neurological:      Mental Status: She is alert and oriented to person, place, and time.   Psychiatric:         Mood and Affect: Mood and affect normal.         Cognition and Memory: Memory normal.         Judgment: Judgment normal.      UPPER EXTREMITIES: Normal alignment, normal range of motion, no atrophy, no skin changes,  hair growth and nail growth normal and equal bilaterally. No swelling, no tenderness.    LOWER EXTREMITIES:  Normal alignment, normal range of motion, no atrophy, no skin changes,  hair growth and nail growth normal and equal bilaterally. No swelling, no tenderness.     CERVICAL SPINE:  Cervical spine: ROM is full in flexion, extension and lateral rotation with increased pain with passive ROM  Spurling's maneuver - deferred given prior fusion  Myofascial exam: Tenderness to palpation across cervical paraspinous region bilaterally. Prior anterior scar is well-healed.      LUMBAR SPINE:    Lumbar spine: ROM is limited with flexion extension and oblique extension with increased pain to any maneuvar.    ((+)) Supine straight leg raise R > L  ((+)) Facet loading bilateral  Internal and external rotation of the hip causes no increased pain on either side.  Myofascial exam: Tenderness to palpation across lumbar paraspinous muscles.     ((+)) TTP at the SI joint bilaterally  ((+)) MIKEY's test  ((+)) One leg stand    ((+)) Distraction test    MOTOR: Tone and bulk: normal bilateral upper and lower Strength: normal   Delt      Bi         Tri        WE      WF                        R          5          5          5          5          5          5            L          5          5          5          5          5          5               IP         ADD     ABD     Quad   TA        Gas      HAM  R    "       5          5          5          5          5          5          5  L          5          5          5          5          5          5          5     SENSATION: Light touch and pinprick intact bilaterally  REFLEXES: normal, symmetric, nonbrisk.  Toes down, no clonus. Negative hilliard's sign bilaterally.  GAIT: normal rise, base, steps, and arm swing.      IMAGING: EMG - 8/11/2022 (Completed at [facility])     " IMPRESSION:   This is an abnormal study.  No electrodiagnostic evidence of neuropathy of left upper extremity of nerves tested or left cervical radiculopathy at this time.  Electrodiagnostic evidence of chronic bilateral lower extremity polyneuropathy in areas tested. Sensory, motor, demyelinating, axonal. Hx of hypothyroidism.   No electrodiagnostic evidence of acute bilateral lumbosacral radiculopathy at this time.     ASSESSMENT: Alessia Nelson is a 66 y.o. female with PMH significant for hx of cervical spine surgery, hx of lumbar spine surgery X 3, hx of peritonitis requiring bowel resection, and torticollis (per patient report) presents as an established patient  for the continued management of back, hip, and neck pain. The patient presents today for medication refill. The patient reports she has decreased her daily intake of Percocet.  The patient reports her pain is essentially unchanged in character since her last clinic visit. The patient does report of right sided hip pain today, she attributes her current pain to sleeping on her right side. The patient is requesting an injection today.  The patient does have some reproducible pain with SI Joint provocation to the right side.  Treatment plan outlined below.   No diagnosis found.        PLAN:  - Methylprednisone 40mg IM given in office today.    - Contacted Aaron Alston to contact to the patient to follow up SCS programming. Informed Sal of follow up appointment date and time and requested a rep be present. Pt had to change appt, sal " was not notified.    - Discussed SI joint injection with patient, pt wishes to defer from steroid injections at this time. She wishes to pursue interventions through [facility].   -  Continue Percocet  mg PO q 8 hr PRN for breakthrough pain. Patient can call for refill.   - Refilled Baclofen 10 mg PO TID PRN for muscle spasms.   - I have stressed the importance of physical activity and a home exercise plan to help with chronic pain and improve health.  - RTC in 8 weeks for follow-up or sooner if needed.     All medication management performed by Dr. Lorenz.   Laura Heath, NP

## 2022-11-13 ENCOUNTER — PATIENT MESSAGE (OUTPATIENT)
Dept: FAMILY MEDICINE | Facility: CLINIC | Age: 67
End: 2022-11-13
Payer: MEDICARE

## 2022-11-18 ENCOUNTER — LAB VISIT (OUTPATIENT)
Dept: LAB | Facility: HOSPITAL | Age: 67
End: 2022-11-18
Attending: STUDENT IN AN ORGANIZED HEALTH CARE EDUCATION/TRAINING PROGRAM
Payer: MEDICARE

## 2022-11-18 DIAGNOSIS — K86.1 CHRONIC PANCREATITIS: Primary | ICD-10-CM

## 2022-11-18 PROCEDURE — 84446 ASSAY OF VITAMIN E: CPT | Performed by: STUDENT IN AN ORGANIZED HEALTH CARE EDUCATION/TRAINING PROGRAM

## 2022-11-18 PROCEDURE — 36415 COLL VENOUS BLD VENIPUNCTURE: CPT | Performed by: STUDENT IN AN ORGANIZED HEALTH CARE EDUCATION/TRAINING PROGRAM

## 2022-11-18 PROCEDURE — 84590 ASSAY OF VITAMIN A: CPT | Performed by: STUDENT IN AN ORGANIZED HEALTH CARE EDUCATION/TRAINING PROGRAM

## 2022-11-22 LAB — VIT A SERPL-MCNC: 56 UG/DL (ref 38–106)

## 2022-11-23 LAB — A-TOCOPHEROL VIT E SERPL-MCNC: 2821 UG/DL (ref 500–1800)

## 2022-11-28 ENCOUNTER — LAB VISIT (OUTPATIENT)
Dept: LAB | Facility: HOSPITAL | Age: 67
End: 2022-11-28
Attending: STUDENT IN AN ORGANIZED HEALTH CARE EDUCATION/TRAINING PROGRAM
Payer: MEDICARE

## 2022-11-28 DIAGNOSIS — K86.1 CHRONIC PANCREATITIS: Primary | ICD-10-CM

## 2022-11-28 PROCEDURE — 82653 EL-1 FECAL QUANTITATIVE: CPT | Performed by: STUDENT IN AN ORGANIZED HEALTH CARE EDUCATION/TRAINING PROGRAM

## 2022-11-29 DIAGNOSIS — G89.4 CHRONIC PAIN DISORDER: Primary | ICD-10-CM

## 2022-11-29 RX ORDER — OXYCODONE AND ACETAMINOPHEN 10; 325 MG/1; MG/1
1 TABLET ORAL EVERY 8 HOURS PRN
Qty: 90 TABLET | Refills: 0 | Status: SHIPPED | OUTPATIENT
Start: 2022-11-29 | End: 2023-01-23 | Stop reason: SDUPTHER

## 2022-12-02 LAB — ELASTASE 1, FECAL: 126 MCG/G

## 2023-01-01 NOTE — ASSESSMENT & PLAN NOTE
Chronic, controlled.  Will continue home medications.     Phone: [phone]     Patient Education    [employer]S HANDOUT- PARENT  4 MONTH VISIT  Here are some suggestions from [employer] experts that may be of value to your family.     HOW YOUR FAMILY IS DOING  Learn if your home or drinking water has lead and take steps to get rid of it. Lead is toxic for everyone.  Take time for yourself and with your partner. Spend time with family and friends.  Choose a mature, trained, and responsible  or caregiver.  You can talk with us about your  choices.    FEEDING YOUR BABY  For babies at 4 months of age, breast milk or iron-fortified formula remains the best food. Solid foods are discouraged until about 6 months of age.  Avoid feeding your baby too much by following the baby s signs of fullness, such as  Leaning back  Turning away  If Breastfeeding  Providing only breast milk for your baby for about the first 6 months after birth provides ideal nutrition. It supports the best possible growth and development.  Be proud of yourself if you are still breastfeeding. Continue as long as you and your baby want.  Know that babies this age go through growth spurts. They may want to breastfeed more often and that is normal.  If you pump, be sure to store your milk properly so it stays safe for your baby. We can give you more information.  Give your baby vitamin D drops (400 IU a day).  Tell us if you are taking any medications, supplements, or herbal preparations.  If Formula Feeding  Make sure to prepare, heat, and store the formula safely.  Feed on demand. Expect him to eat about 30 to 32 oz daily.  Hold your baby so you can look at each other when you feed him.  Always hold the bottle. Never prop it.  Don t give your baby a bottle while he is in a crib.    YOUR CHANGING BABY  Create routines for feeding, nap time, and bedtime.  Calm your baby with soothing and gentle touches when she is fussy.  Make time for quiet play.  Hold your baby and talk with  her.  Read to your baby often.  Encourage active play.  Offer floor gyms and colorful toys to hold.  Put your baby on her tummy for playtime. Don t leave her alone during tummy time or allow her to sleep on her tummy.  Don t have a TV on in the background or use a TV or other digital media to calm your baby.    HEALTHY TEETH  Go to your own dentist twice yearly. It is important to keep your teeth healthy so you don t pass bacteria that cause cavities on to your baby.  Don t share spoons with your baby or use your mouth to clean the baby s pacifier.  Use a cold teething ring if your baby s gums are sore from teething.  Don t put your baby in a crib with a bottle.  Clean your baby s gums and teeth (as soon as you see the first tooth) 2 times per day with a soft cloth or soft toothbrush and a small smear of fluoride toothpaste (no more than a grain of rice).    SAFETY  Use a rear-facing-only car safety seat in the back seat of all vehicles.  Never put your baby in the front seat of a vehicle that has a passenger airbag.  Your baby s safety depends on you. Always wear your lap and shoulder seat belt. Never drive after drinking alcohol or using drugs. Never text or use a cell phone while driving.  Always put your baby to sleep on her back in her own crib, not in your bed.  Your baby should sleep in your room until she is at least 6 months of age.  Make sure your baby s crib or sleep surface meets the most recent safety guidelines.  Don t put soft objects and loose bedding such as blankets, pillows, bumper pads, and toys in the crib.  Drop-side cribs should not be used.  Lower the crib mattress.  If you choose to use a mesh playpen, get one made after February 28, 2013.  Prevent tap water burns. Set the water heater so the temperature at the faucet is at or below 120 F /49 C.  Prevent scalds or burns. Don t drink hot drinks when holding your baby.  Keep a hand on your baby on any surface from which she might fall and get  hurt, such as a changing table, couch, or bed.  Never leave your baby alone in bathwater, even in a bath seat or ring.  Keep small objects, small toys, and latex balloons away from your baby.  Don t use a baby walker.    WHAT TO EXPECT AT YOUR BABY S 6 MONTH VISIT  We will talk about  Caring for your baby, your family, and yourself  Teaching and playing with your baby  Brushing your baby s teeth  Introducing solid food  Keeping your baby safe at home, outside, and in the car        Helpful Resources:  Information About Car Safety Seats: www.safercar.gov/parents  Toll-free Auto Safety Hotline: 565.384.8998  Consistent with Bright Futures: Guidelines for Health Supervision of Infants, Children, and Adolescents, 4th Edition  For more information, go to https://brightfutures.aap.org.

## 2023-01-05 ENCOUNTER — PATIENT MESSAGE (OUTPATIENT)
Dept: ADMINISTRATIVE | Facility: HOSPITAL | Age: 68
End: 2023-01-05
Payer: MEDICARE

## 2023-01-11 ENCOUNTER — OFFICE VISIT (OUTPATIENT)
Dept: FAMILY MEDICINE | Facility: CLINIC | Age: 68
End: 2023-01-11
Payer: MEDICARE

## 2023-01-11 VITALS
HEIGHT: 67 IN | SYSTOLIC BLOOD PRESSURE: 132 MMHG | DIASTOLIC BLOOD PRESSURE: 78 MMHG | BODY MASS INDEX: 27.02 KG/M2 | RESPIRATION RATE: 16 BRPM | WEIGHT: 172.19 LBS

## 2023-01-11 DIAGNOSIS — R11.0 NAUSEA: ICD-10-CM

## 2023-01-11 DIAGNOSIS — J01.00 ACUTE NON-RECURRENT MAXILLARY SINUSITIS: Primary | ICD-10-CM

## 2023-01-11 DIAGNOSIS — Z86.19 HISTORY OF CLOSTRIDIUM DIFFICILE INFECTION: ICD-10-CM

## 2023-01-11 PROCEDURE — 99214 OFFICE O/P EST MOD 30 MIN: CPT | Mod: S$PBB,,, | Performed by: STUDENT IN AN ORGANIZED HEALTH CARE EDUCATION/TRAINING PROGRAM

## 2023-01-11 PROCEDURE — 99999 PR PBB SHADOW E&M-EST. PATIENT-LVL III: CPT | Mod: PBBFAC,,, | Performed by: STUDENT IN AN ORGANIZED HEALTH CARE EDUCATION/TRAINING PROGRAM

## 2023-01-11 PROCEDURE — 99213 OFFICE O/P EST LOW 20 MIN: CPT | Mod: PBBFAC,PN | Performed by: STUDENT IN AN ORGANIZED HEALTH CARE EDUCATION/TRAINING PROGRAM

## 2023-01-11 PROCEDURE — 99214 PR OFFICE/OUTPT VISIT, EST, LEVL IV, 30-39 MIN: ICD-10-PCS | Mod: S$PBB,,, | Performed by: STUDENT IN AN ORGANIZED HEALTH CARE EDUCATION/TRAINING PROGRAM

## 2023-01-11 PROCEDURE — 99999 PR PBB SHADOW E&M-EST. PATIENT-LVL III: ICD-10-PCS | Mod: PBBFAC,,, | Performed by: STUDENT IN AN ORGANIZED HEALTH CARE EDUCATION/TRAINING PROGRAM

## 2023-01-11 RX ORDER — AMOXICILLIN AND CLAVULANATE POTASSIUM 875; 125 MG/1; MG/1
1 TABLET, FILM COATED ORAL 2 TIMES DAILY
Qty: 10 TABLET | Refills: 0 | Status: SHIPPED | OUTPATIENT
Start: 2023-01-11 | End: 2023-01-16

## 2023-01-11 RX ORDER — ONDANSETRON 4 MG/1
8 TABLET, ORALLY DISINTEGRATING ORAL EVERY 8 HOURS PRN
Qty: 60 TABLET | Refills: 0 | Status: SHIPPED | OUTPATIENT
Start: 2023-01-11 | End: 2023-11-14

## 2023-01-11 RX ORDER — ALLOPURINOL 300 MG/1
300 TABLET ORAL DAILY
Qty: 30 TABLET | Refills: 0 | Status: SHIPPED | OUTPATIENT
Start: 2023-01-11 | End: 2023-02-22 | Stop reason: SDUPTHER

## 2023-01-11 NOTE — PROGRESS NOTES
Subjective:       Patient ID: Alessia Nelson is a 67 y.o. female.    Chief Complaint: Sinusitis (Over a week, sneezing, dried blood comes out of nose)    Sinusitis-  She has 2 weeks or worsening nasal congestion, facial pressure, ear pain, jaw pain  She cannot wear her dentures due to the worsening discomfort.  Mostly clear nasal discharge until recently color change and blood.  She is now having fever and chill feelings.     She does have a history of C diff colitis with sepsis in the past  She sees GI.  She has had to take oral antibiotics for this  Flagyl seems to work the best    She has gotten c diff from many different antibiotics.  Last episode was a year or two ago per her record.    She needs refills of her allopurinol and zofran prior to her establish care visit    Review of Systems   Constitutional:  Positive for chills and fever. Negative for activity change and appetite change.   HENT:  Positive for nasal congestion, dental problem, ear pain, nosebleeds, postnasal drip, rhinorrhea and sinus pressure/congestion. Negative for sore throat.    Respiratory:  Negative for shortness of breath.    Cardiovascular:  Negative for chest pain.   Gastrointestinal:  Positive for diarrhea (chronic) and nausea. Negative for anal bleeding.   Genitourinary:  Negative for dysuria.   Integumentary:  Negative for rash.   Psychiatric/Behavioral:  Negative for dysphoric mood and sleep disturbance. The patient is not nervous/anxious.        Objective:      Physical Exam  Constitutional:       General: She is not in acute distress.     Appearance: Normal appearance. She is not ill-appearing.   HENT:      Right Ear: Tympanic membrane, ear canal and external ear normal. There is no impacted cerumen.      Left Ear: Tympanic membrane, ear canal and external ear normal. There is no impacted cerumen.      Nose: Congestion present.      Mouth/Throat:      Pharynx: No oropharyngeal exudate or posterior oropharyngeal erythema.   Eyes:       Conjunctiva/sclera: Conjunctivae normal.   Cardiovascular:      Rate and Rhythm: Normal rate and regular rhythm.      Heart sounds: Normal heart sounds. No murmur heard.  Pulmonary:      Effort: Pulmonary effort is normal. No respiratory distress.      Breath sounds: Normal breath sounds.   Abdominal:      Palpations: Abdomen is soft.   Musculoskeletal:      Right lower leg: No edema.      Left lower leg: No edema.   Skin:     General: Skin is warm and dry.   Neurological:      Mental Status: She is alert. Mental status is at baseline.      Gait: Gait normal.   Psychiatric:         Mood and Affect: Mood normal.         Behavior: Behavior normal.         Thought Content: Thought content normal.         Judgment: Judgment normal.       Assessment:       1. Acute non-recurrent maxillary sinusitis    2. History of Clostridium difficile infection    3. Nausea          Plan:       Problem List Items Addressed This Visit          ID    History of Clostridium difficile infection     She has had multiple c diff infections.  She is responsive to flagyl.  She has a GI team.    Will try to keep antibiotics short course due to this  Augmentin, doxy, cefixime, levaquin all have c diff risk.             GI    Nausea    Relevant Medications    ondansetron (ZOFRAN-ODT) 4 MG TbDL     Other Visit Diagnoses       Acute non-recurrent maxillary sinusitis    -  Primary    short course augmentin. monitor for c diff symptoms    Relevant Medications    amoxicillin-clavulanate 875-125mg (AUGMENTIN) 875-125 mg per tablet

## 2023-01-20 ENCOUNTER — TELEPHONE (OUTPATIENT)
Dept: FAMILY MEDICINE | Facility: CLINIC | Age: 68
End: 2023-01-20
Payer: MEDICARE

## 2023-01-20 NOTE — TELEPHONE ENCOUNTER
----- Message from Manpreet Wallace sent at 1/20/2023  9:06 AM CST -----  Type:  Sooner Appointment Request    Caller is requesting a sooner appointment.  Caller declined first available appointment listed below.  Caller will not accept being placed on the waitlist and is requesting a message be sent to doctor.    Name of Caller:  Patient  When is the first available appointment?  Pt had to cancel her appointment on 01/20/23 and soonest is 02/02/23  Symptoms:    Best Call Back Number:  [phone]  Additional Information:

## 2023-01-20 NOTE — TELEPHONE ENCOUNTER
"Spoke with pt. Offered pt apt, pt states, " never mind, it is always a problem to be seen because yall keep losing doctors." Informed pt Dr. Traore went on maternity leave and PRN. Pt states she does not wish to schedule. Voiced understanding.   "

## 2023-01-23 ENCOUNTER — OFFICE VISIT (OUTPATIENT)
Dept: PAIN MEDICINE | Facility: CLINIC | Age: 68
End: 2023-01-23
Payer: MEDICARE

## 2023-01-23 DIAGNOSIS — M79.18 MYOFASCIAL PAIN: ICD-10-CM

## 2023-01-23 DIAGNOSIS — Z79.891 CHRONIC USE OF OPIATE FOR THERAPEUTIC PURPOSE: ICD-10-CM

## 2023-01-23 DIAGNOSIS — G24.3 CERVICAL DYSTONIA: Primary | ICD-10-CM

## 2023-01-23 DIAGNOSIS — G89.4 CHRONIC PAIN DISORDER: Primary | ICD-10-CM

## 2023-01-23 DIAGNOSIS — M43.6 TORTICOLLIS: ICD-10-CM

## 2023-01-23 PROCEDURE — 99214 OFFICE O/P EST MOD 30 MIN: CPT | Mod: S$PBB,,,

## 2023-01-23 PROCEDURE — 99999 PR PBB SHADOW E&M-EST. PATIENT-LVL III: ICD-10-PCS | Mod: PBBFAC,,,

## 2023-01-23 PROCEDURE — 99999 PR PBB SHADOW E&M-EST. PATIENT-LVL III: CPT | Mod: PBBFAC,,,

## 2023-01-23 PROCEDURE — 99213 OFFICE O/P EST LOW 20 MIN: CPT | Mod: PBBFAC,PO

## 2023-01-23 PROCEDURE — 80326 AMPHETAMINES 5 OR MORE: CPT

## 2023-01-23 PROCEDURE — 99214 PR OFFICE/OUTPT VISIT, EST, LEVL IV, 30-39 MIN: ICD-10-PCS | Mod: S$PBB,,,

## 2023-01-23 RX ORDER — OXYCODONE AND ACETAMINOPHEN 10; 325 MG/1; MG/1
1 TABLET ORAL EVERY 8 HOURS PRN
Qty: 90 TABLET | Refills: 0 | Status: SHIPPED | OUTPATIENT
Start: 2023-02-20 | End: 2023-03-27 | Stop reason: SDUPTHER

## 2023-01-23 RX ORDER — OXYCODONE AND ACETAMINOPHEN 10; 325 MG/1; MG/1
1 TABLET ORAL EVERY 8 HOURS PRN
Qty: 90 TABLET | Refills: 0 | Status: SHIPPED | OUTPATIENT
Start: 2023-01-23 | End: 2023-03-27 | Stop reason: SDUPTHER

## 2023-01-23 NOTE — PROGRESS NOTES
"FOLLOW UP NOTE:     CHIEF COMPLAINT: neck and low back pain    INTERVAL HISTORY OF PRESENT ILLNESS: Alessia Nelson is a 67 y.o. female with PMH significant for hx of cervical spine surgery, hx of lumbar spine surgery X 3, hx of peritonitis requiring bowel resection, and torticollis (per patient report) presents as an established patient for the continued management of back, hip, and neck pain. The patient presents today for medication refill. Today, the patient localizes the worse of her pain to her neck and jaw. The patient reports diagnosis of torticollis 10 years ago where she received botox injections in her neck to relieve a similar pain. The patient is requesting to repeat botox injection. The patient reports the pain is bilateral; R>L. The patient also reports of her neck "feeling tight." The patient reports long term benefit of PT but as of late she has not had any relief.  The patient also continues to report of neck pain with radiation into BUE and reports of numbness and tingling into hands and fingers.  Of note, the patient denies benefit of SCS.   She reports that she is taking percocet  mg PO PRN with benefit.  The patient also reports of bilateral jaw pain. The patient reports of radiation of her neck pain to her jaw.   The patient denies of any significant changes in her health since her last appointment. The patient also denies of any changes in the character of her pain since her last appointment. The patient reports that her current pain is a 5/10. Patient denies of any urinary/fecal incontinence, saddle anesthesia, or weakness.     INITIAL HISTORY OF PRESENT ILLNESS: Alessia Nelson is a 65 y.o. female with PMH significant for hx of cervical spine surgery, hx of lumbar spine surgery X 3, hx of peritonitis requiring bowel resection, and torticollis (per patient report) presents as an established patient of Dr. Aragon (new to me) for the evaluation of low back and neck pain. The patient " reports that she has been in pain for over twenty years. She reports that she had her neck surgery in 2006/2007 in [place]. She reports of benefit in regards to her neck pain. The patient reports of her initial lumbar spine surgery in 2007/2008 in [place] as well. She reports of improvement after her initial surgery. She reports that she had a second lumbar spine surgery in 2008/2009 as her pain recurred. She reports of benefit with this surgery as well. She reports of a third lumbar spine surgery in 2012. She reports of doing well for a couple years until she fell out of her bed. The patient reports that her pain has been worsening ever since then. Low back pain > neck pain. The patient localizes her pain to the area across her lower back.  The patient reports of radiation down her RLE to her knee. The patient reports of numbness in her feet and hands bilaterally. The patient describes her pain as a sharp and aching type of pain. The patient reports that her pain is worsened with any activity that requires walking and activities such as doing laundry. The patient reports of benefit with pain medication. The patient reports that her current pain is a 6/10. Patient denies of any urinary/fecal incontinence, saddle anesthesia, or weakness.      In regards to her neck pain, the patient localizes her pain to the middle/right side of her neck. The patient reports of radiation down her RUE to her elbow. The patient reports that her current pain in her neck is a 6/10. Of note, the patient reports that she would be willing to undergo surgery if she has to. I have updated the patient's current pain regimen below.         INTERVENTIONAL PAIN HISTORY:  12/8/2021: C7-T1 cervical interlaminar epidural steroid injection - no relief  9/22/2021:  Right sacroiliac joint injection - significant relief  6/18/2021: Insertion of SCS (Nevro)   6/7/2021: SCS Trial - 70 - 80% benefit  3/1/2021: Caudal epidural steroid injection - no benefit    6/10/2019: L5-S3 Right Medial Branch Nerve Coolief thermal Radiofrequency Ablation via Dr. Aragon - no benefit per chart review     CURRENT PAIN MEDICATIONS:   Percocet  mg PO TID  OTC Tylenol   Gabapentin 400 mg PO BID  Baclofen 10 mg PO BID/TID  No longer taking:     tizanidine causes dry mouth.           ROS:  Review of Systems   Constitutional:  Negative for chills and fever.   HENT:  Negative for sore throat.    Eyes:  Negative for visual disturbance.   Respiratory:  Negative for shortness of breath.    Cardiovascular:  Negative for chest pain.   Gastrointestinal:  Negative for nausea and vomiting.   Genitourinary:  Negative for difficulty urinating.   Musculoskeletal:  Positive for arthralgias, back pain, gait problem and neck pain.   Skin:  Negative for rash.   Allergic/Immunologic: Negative for immunocompromised state.   Neurological:  Positive for numbness. Negative for syncope.   Hematological:  Does not bruise/bleed easily.   Psychiatric/Behavioral:  Negative for suicidal ideas.    All other systems reviewed and are negative.     MEDICAL, SURGICAL, FAMILY, SOCIAL HX: reviewed    MEDICATIONS/ALLERGIES: reviewed    PHYSICAL EXAM:    VITALS: Vitals reviewed.   There were no vitals filed for this visit.      Physical Exam  Vitals and nursing note reviewed.   Constitutional:       Appearance: She is not diaphoretic.   HENT:      Head: Normocephalic and atraumatic.   Eyes:      General:         Right eye: No discharge.         Left eye: No discharge.      Conjunctiva/sclera: Conjunctivae normal.   Cardiovascular:      Rate and Rhythm: Normal rate.   Pulmonary:      Effort: Pulmonary effort is normal. No respiratory distress.      Breath sounds: Normal breath sounds.   Abdominal:      Palpations: Abdomen is soft.   Musculoskeletal:      Right knee: Swelling present. Tenderness present.        Legs:    Skin:     General: Skin is warm and dry.      Findings: No rash.   Neurological:      Mental Status: She  is alert and oriented to person, place, and time.   Psychiatric:         Mood and Affect: Mood and affect normal.         Cognition and Memory: Memory normal.         Judgment: Judgment normal.      UPPER EXTREMITIES: Normal alignment, normal range of motion, no atrophy, no skin changes,  hair growth and nail growth normal and equal bilaterally. No swelling, no tenderness.    LOWER EXTREMITIES:  Normal alignment, normal range of motion, no atrophy, no skin changes,  hair growth and nail growth normal and equal bilaterally. No swelling, no tenderness.     CERVICAL SPINE:  Cervical spine: ROM is full in flexion, extension and lateral rotation with increased pain with passive ROM and lateral rotation.   Spurling's maneuver - deferred given prior fusion  Myofascial exam: Tenderness to palpation across cervical paraspinous region bilaterally. Prior anterior scar is well-healed.      LUMBAR SPINE:    Lumbar spine: ROM is limited with flexion extension and oblique extension with increased pain to any maneuvar.    ((+)) Supine straight leg raise R > L  ((+)) Facet loading bilateral  Internal and external rotation of the hip causes no increased pain on either side.  Myofascial exam: Tenderness to palpation across lumbar paraspinous muscles.     ((+)) TTP at the SI joint bilaterally  ((+)) MIKEY's test  ((+)) One leg stand    ((+)) Distraction test    MOTOR: Tone and bulk: normal bilateral upper and lower Strength: normal   Delt      Bi         Tri        WE      WF                        R          5          5          5          5          5          5            L          5          5          5          5          5          5               IP         ADD     ABD     Quad   TA        Gas      HAM  R          5          5          5          5          5          5          5  L          5          5          5          5          5          5          5     SENSATION: Light touch and pinprick intact  "bilaterally  REFLEXES: normal, symmetric, nonbrisk.  Toes down, no clonus. Negative hilliard's sign bilaterally.  GAIT: normal rise, base, steps, and arm swing.      IMAGING: EMG - 8/11/2022 (Completed at [facility])     " IMPRESSION:   This is an abnormal study.  No electrodiagnostic evidence of neuropathy of left upper extremity of nerves tested or left cervical radiculopathy at this time.  Electrodiagnostic evidence of chronic bilateral lower extremity polyneuropathy in areas tested. Sensory, motor, demyelinating, axonal. Hx of hypothyroidism.   No electrodiagnostic evidence of acute bilateral lumbosacral radiculopathy at this time.     ASSESSMENT: Alessia Nelson is a 66 y.o. female with PMH significant for hx of cervical spine surgery, hx of lumbar spine surgery X 3, hx of peritonitis requiring bowel resection, and torticollis (per patient report) presents as an established patient  for the continued management of back, hip, and neck pain. The patient presents today for medication refill. The patient localizes her pain to her neck, previous botox injections for torticollis and cervical dystonia provided excellent relief of similar pain. The patient is requesting to repeat botox injection of neck.  Treatment plan outlined below.   Encounter Diagnoses   Name Primary?    Torticollis     Myofascial pain     Cervical dystonia Yes    Chronic use of opiate for therapeutic purpose            PLAN:  - Schedule for botox injection for torticollis and cervical dystonia  with Dr. Carroll.  -  Refilled Percocet  mg PO q 8 hr, #90, 1 refill.  reviewed.   - UDS collected today, patient last had pain medication last night.   - Continue medication as prescribed.   - I have stressed the importance of physical activity and a home exercise plan to help with chronic pain and improve health.  - Recommended F/U with oral and maxillofacial surgery for jaw pain   - F/U s/p botox with Dr. Carroll    All medication management performed by " Dr. Carroll.   Laura Heath, NP

## 2023-01-29 LAB
6MAM UR QL: NOT DETECTED
7AMINOCLONAZEPAM UR QL: NOT DETECTED
A-OH ALPRAZ UR QL: NOT DETECTED
ALPHA-OH-MIDAZOLAM: NOT DETECTED
ALPRAZ UR QL: NOT DETECTED
AMPHET UR QL SCN: NOT DETECTED
ANNOTATION COMMENT IMP: NORMAL
ANNOTATION COMMENT IMP: NORMAL
BARBITURATES UR QL: NOT DETECTED
BUPRENORPHINE UR QL: NOT DETECTED
BZE UR QL: NOT DETECTED
CARBOXYTHC UR QL: NOT DETECTED
CARISOPRODOL UR QL: NOT DETECTED
CLONAZEPAM UR QL: NOT DETECTED
CODEINE UR QL: NOT DETECTED
CREAT UR-MCNC: 123.8 MG/DL (ref 20–400)
DIAZEPAM UR QL: NOT DETECTED
ETHYL GLUCURONIDE UR QL: NOT DETECTED
FENTANYL UR QL: NOT DETECTED
GABAPENTIN: PRESENT
HYDROCODONE UR QL: NOT DETECTED
HYDROMORPHONE UR QL: NOT DETECTED
LORAZEPAM UR QL: NOT DETECTED
MDA UR QL: NOT DETECTED
MDEA UR QL: NOT DETECTED
MDMA UR QL: NOT DETECTED
ME-PHENIDATE UR QL: NOT DETECTED
METHADONE UR QL: NOT DETECTED
METHAMPHET UR QL: NOT DETECTED
MIDAZOLAM UR QL SCN: NOT DETECTED
MORPHINE UR QL: NOT DETECTED
NALOXONE: NOT DETECTED
NORBUPRENORPHINE UR QL CFM: NOT DETECTED
NORDIAZEPAM UR QL: NOT DETECTED
NORFENTANYL UR QL: NOT DETECTED
NORHYDROCODONE UR QL CFM: NOT DETECTED
NORMEPERIDINE UR QL CFM: NOT DETECTED
NOROXYCODONE UR QL CFM: PRESENT
NOROXYMORPHONE UR QL SCN: PRESENT
OXAZEPAM UR QL: NOT DETECTED
OXYCODONE UR QL: PRESENT
OXYMORPHONE UR QL: PRESENT
PATHOLOGY STUDY: NORMAL
PCP UR QL: NOT DETECTED
PHENTERMINE UR QL: NOT DETECTED
PREGABALIN: NOT DETECTED
SERVICE CMNT-IMP: NORMAL
TAPENTADOL UR QL SCN: NOT DETECTED
TAPENTADOL UR QL SCN: NOT DETECTED
TEMAZEPAM UR QL: NOT DETECTED
TRAMADOL UR QL: NOT DETECTED
ZOLPIDEM METABOLITE: NOT DETECTED
ZOLPIDEM UR QL: NOT DETECTED

## 2023-02-22 ENCOUNTER — TELEPHONE (OUTPATIENT)
Dept: PAIN MEDICINE | Facility: CLINIC | Age: 68
End: 2023-02-22
Payer: MEDICARE

## 2023-02-22 NOTE — TELEPHONE ENCOUNTER
----- Message from Laura Heath NP sent at 2/22/2023  3:05 PM CST -----  Regarding: RE: advice  Contact: patient  I do not believe this will be an issue.  I would  advise the patient if she starts to run a fever to reschedule appointment.   Laura Heath NP   ----- Message -----  From: Gretta Merino LPN  Sent: 2/22/2023   2:58 PM CST  To: Laura Heath NP, Liana Shabazz LPN  Subject: FW: advice                                       Not on antibiotics , feels like sinus infection started today , can she still get botox, says supposed to be in neck or back head   ----- Message -----  From: Angelina Merino  Sent: 2/22/2023   2:49 PM CST  To: [facility] Staff  Subject: advice                                           Type: Needs Medical Advice  Who Called:  patient  Symptoms (please be specific):  sinus infection  How long has patient had these symptoms:    Pharmacy name and phone #:    Best Call Back Number: [phone] (home)   Additional Information: Patient has a Botox injection scheduled for tomorrow. She has developed a sinus infection. Will he be able to do the injections. Please call patient to advise.Thanks!

## 2023-02-22 NOTE — TELEPHONE ENCOUNTER
Instructed pt if started running fever to cancel appt for botox, notified per NP instructions. Verbalized understanding.

## 2023-02-22 NOTE — TELEPHONE ENCOUNTER
----- Message from Angelina Merino sent at 2/22/2023  2:47 PM CST -----  Regarding: advice  Contact: patient  Type: Needs Medical Advice  Who Called:  patient  Symptoms (please be specific):  sinus infection  How long has patient had these symptoms:    Pharmacy name and phone #:    Best Call Back Number: [phone] (home)   Additional Information: Patient has a Botox injection scheduled for tomorrow. She has developed a sinus infection. Will he be able to do the injections. Please call patient to advise.Thanks!

## 2023-02-23 ENCOUNTER — OFFICE VISIT (OUTPATIENT)
Dept: PAIN MEDICINE | Facility: CLINIC | Age: 68
End: 2023-02-23
Payer: MEDICARE

## 2023-02-23 VITALS
HEART RATE: 54 BPM | BODY MASS INDEX: 27 KG/M2 | HEIGHT: 67 IN | SYSTOLIC BLOOD PRESSURE: 111 MMHG | DIASTOLIC BLOOD PRESSURE: 64 MMHG | WEIGHT: 172 LBS

## 2023-02-23 DIAGNOSIS — G24.3 CERVICAL DYSTONIA: Primary | ICD-10-CM

## 2023-02-23 DIAGNOSIS — M96.1 POSTLAMINECTOMY SYNDROME OF LUMBAR REGION: ICD-10-CM

## 2023-02-23 DIAGNOSIS — G89.4 CHRONIC PAIN DISORDER: ICD-10-CM

## 2023-02-23 DIAGNOSIS — M51.36 DDD (DEGENERATIVE DISC DISEASE), LUMBAR: ICD-10-CM

## 2023-02-23 DIAGNOSIS — M50.30 DDD (DEGENERATIVE DISC DISEASE), CERVICAL: ICD-10-CM

## 2023-02-23 PROCEDURE — 99999 PR PBB SHADOW E&M-EST. PATIENT-LVL IV: CPT | Mod: PBBFAC,,, | Performed by: ANESTHESIOLOGY

## 2023-02-23 PROCEDURE — 99214 OFFICE O/P EST MOD 30 MIN: CPT | Mod: PBBFAC,PN,25 | Performed by: ANESTHESIOLOGY

## 2023-02-23 PROCEDURE — 64616 CHEMODENERV MUSC NECK DYSTON: CPT | Mod: PBBFAC,PN | Performed by: ANESTHESIOLOGY

## 2023-02-23 PROCEDURE — 64616 PR CHEMODENERVATION NECK MUSCLES EXC LARNYNX, UNI: ICD-10-PCS | Mod: 50,S$PBB,, | Performed by: ANESTHESIOLOGY

## 2023-02-23 PROCEDURE — 64616 CHEMODENERV MUSC NECK DYSTON: CPT | Mod: 50,S$PBB,, | Performed by: ANESTHESIOLOGY

## 2023-02-23 PROCEDURE — 99999 PR PBB SHADOW E&M-EST. PATIENT-LVL IV: ICD-10-PCS | Mod: PBBFAC,,, | Performed by: ANESTHESIOLOGY

## 2023-02-23 PROCEDURE — 99214 PR OFFICE/OUTPT VISIT, EST, LEVL IV, 30-39 MIN: ICD-10-PCS | Mod: 25,S$PBB,, | Performed by: ANESTHESIOLOGY

## 2023-02-23 PROCEDURE — 99214 OFFICE O/P EST MOD 30 MIN: CPT | Mod: 25,S$PBB,, | Performed by: ANESTHESIOLOGY

## 2023-02-23 RX ADMIN — ONABOTULINUMTOXINA 200 UNITS: 200 INJECTION, POWDER, LYOPHILIZED, FOR SOLUTION INTRADERMAL; INTRAMUSCULAR at 11:02

## 2023-02-23 NOTE — PROGRESS NOTES
"This note was completed with dictation software and grammatical errors may exist.      FOLLOW UP NOTE:     CHIEF COMPLAINT: neck and low back pain    INTERVAL HISTORY: Alessia Nelson is a 67 y.o. female with PMH significant for hx of cervical spine surgery, hx of lumbar spine surgery X 3, hx of peritonitis requiring bowel resection, and torticollis (per patient report) presents as an established patient for the continued management of back, hip, and neck pain. Today, the patient localizes the worse of her pain to her neck and jaw. The patient reports diagnosis of torticollis 10 years ago where she received botox injections in her neck to relieve a similar pain. The patient is requesting to repeat botox injection. The patient reports the pain is bilateral; R>L. The patient also reports of her neck "feeling tight." The patient reports long term benefit of PT but as of late she has not had any relief.  The patient also continues to report of neck pain with radiation into BUE and reports of numbness and tingling into hands and fingers.  Of note, the patient denies benefit of SCS.   She reports that she is taking percocet  mg PO PRN with benefit.  The patient also reports of bilateral jaw pain. The patient reports of radiation of her neck pain to her jaw.   The patient denies of any significant changes in her health since her last appointment. The patient also denies of any changes in the character of her pain since her last appointment. The patient reports that her current pain is a 5/10. Patient denies of any urinary/fecal incontinence, saddle anesthesia, or weakness.     PRIOR HPI: Alessia Nelson is a 65 y.o. female with PMH significant for hx of cervical spine surgery, hx of lumbar spine surgery X 3, hx of peritonitis requiring bowel resection, and torticollis (per patient report) presents as an established patient of Dr. Aragon (new to me) for the evaluation of low back and neck pain. The patient reports " that she has been in pain for over twenty years. She reports that she had her neck surgery in 2006/2007 in [place]. She reports of benefit in regards to her neck pain. The patient reports of her initial lumbar spine surgery in 2007/2008 in [place] as well. She reports of improvement after her initial surgery. She reports that she had a second lumbar spine surgery in 2008/2009 as her pain recurred. She reports of benefit with this surgery as well. She reports of a third lumbar spine surgery in 2012. She reports of doing well for a couple years until she fell out of her bed. The patient reports that her pain has been worsening ever since then. Low back pain > neck pain. The patient localizes her pain to the area across her lower back.  The patient reports of radiation down her RLE to her knee. The patient reports of numbness in her feet and hands bilaterally. The patient describes her pain as a sharp and aching type of pain. The patient reports that her pain is worsened with any activity that requires walking and activities such as doing laundry. The patient reports of benefit with pain medication. The patient reports that her current pain is a 6/10. Patient denies of any urinary/fecal incontinence, saddle anesthesia, or weakness.      In regards to her neck pain, the patient localizes her pain to the middle/right side of her neck. The patient reports of radiation down her RUE to her elbow. The patient reports that her current pain in her neck is a 6/10. Of note, the patient reports that she would be willing to undergo surgery if she has to. I have updated the patient's current pain regimen below.         INTERVENTIONAL PAIN HISTORY:  12/8/2021: C7-T1 cervical interlaminar epidural steroid injection - no relief  9/22/2021:  Right sacroiliac joint injection - significant relief  6/18/2021: Insertion of SCS (Nevro)   6/7/2021: SCS Trial - 70 - 80% benefit  3/1/2021: Caudal epidural steroid injection - no benefit  "  6/10/2019: L5-S3 Right Medial Branch Nerve Coolief thermal Radiofrequency Ablation via Dr. Aragon - no benefit per chart review     CURRENT PAIN MEDICATIONS:   Percocet  mg PO TID  OTC Tylenol   Gabapentin 400 mg PO BID  Baclofen 10 mg PO BID/TID  No longer taking:     tizanidine causes dry mouth.           ROS:  Review of Systems   Constitutional:  Negative for chills and fever.   HENT:  Negative for sore throat.    Eyes:  Negative for visual disturbance.   Respiratory:  Negative for shortness of breath.    Cardiovascular:  Negative for chest pain.   Gastrointestinal:  Negative for nausea and vomiting.   Genitourinary:  Negative for difficulty urinating.   Musculoskeletal:  Positive for arthralgias, back pain, gait problem and neck pain.   Skin:  Negative for rash.   Allergic/Immunologic: Negative for immunocompromised state.   Neurological:  Positive for numbness. Negative for syncope.   Hematological:  Does not bruise/bleed easily.   Psychiatric/Behavioral:  Negative for suicidal ideas.    All other systems reviewed and are negative.     MEDICAL, SURGICAL, FAMILY, SOCIAL HX: reviewed    MEDICATIONS/ALLERGIES: reviewed    PHYSICAL EXAM:    VITALS: Vitals reviewed.   Vitals:    02/23/23 1000   BP: 111/64   Pulse: (!) 54   Weight: 78 kg (172 lb)   Height: 5' 7" (1.702 m)   PainSc:   6   PainLoc: Neck            UPPER EXTREMITIES: Normal alignment, normal range of motion, no atrophy, no skin changes,  hair growth and nail growth normal and equal bilaterally. No swelling, no tenderness.    LOWER EXTREMITIES:  Normal alignment, normal range of motion, no atrophy, no skin changes,  hair growth and nail growth normal and equal bilaterally. No swelling, no tenderness.     CERVICAL SPINE:  Cervical spine: ROM is full in flexion, extension and lateral rotation with increased pain with passive ROM and lateral rotation.   Spurling's maneuver - deferred given prior fusion  Myofascial exam: Tenderness to palpation across " "cervical paraspinous region bilaterally. Prior anterior scar is well-healed.      LUMBAR SPINE:    Lumbar spine: ROM is limited with flexion extension and oblique extension with increased pain to any maneuvar.    ((+)) Supine straight leg raise R > L  ((+)) Facet loading bilateral  Internal and external rotation of the hip causes no increased pain on either side.  Myofascial exam: Tenderness to palpation across lumbar paraspinous muscles.     ((+)) TTP at the SI joint bilaterally  ((+)) MIKEY's test  ((+)) One leg stand    ((+)) Distraction test    MOTOR: Tone and bulk: normal bilateral upper and lower Strength: normal    SENSATION: Light touch and pinprick intact bilaterally  REFLEXES: normal, symmetric, nonbrisk.  Toes down, no clonus. Negative hilliard's sign bilaterally.  GAIT: normal rise, base, steps, and arm swing.      IMAGING: EMG - 8/11/2022 (Completed at [facility])     " IMPRESSION:   This is an abnormal study.  No electrodiagnostic evidence of neuropathy of left upper extremity of nerves tested or left cervical radiculopathy at this time.  Electrodiagnostic evidence of chronic bilateral lower extremity polyneuropathy in areas tested. Sensory, motor, demyelinating, axonal. Hx of hypothyroidism.   No electrodiagnostic evidence of acute bilateral lumbosacral radiculopathy at this time.     ASSESSMENT:   Patient presents with   1. Cervical dystonia    2. DDD (degenerative disc disease), lumbar    3. DDD (degenerative disc disease), cervical    4. Postlaminectomy syndrome of lumbar region    5. Chronic pain disorder                PLAN:  I have stressed the importance of physical activity and exercise to improve overall health  Reviewed pertinent imaging and records with patient  Perform repeat botox injection treatments for cervical dystonia today  Scripts given last visit with Laura  Follow up in Mid March as scheduled    PROCEDURE NOTE:    Botox injections for cervical dystonia    Timeout performed prior to " procedure.  A consent was signed after reviewing the risks of the injection with Botox and the patient decided to proceed. Regions were cleaned with ChloraPrep. 200 units of Botox was drawn up with preservative free saline as the solution, with 5 units of Botox per 0.1 milliliter. Muscles injected using 0.5inch 30g needle. 100 units injected over bilateral cervical paraspinals.  100 units injected over bilateral trapezius.  Total units used was approximately 200 units of Botox. The patient tolerated the procedure well, was observed for 15 minutes after the procedure and was discharged home in stable condition.

## 2023-02-25 ENCOUNTER — EXTERNAL HOME HEALTH (OUTPATIENT)
Dept: HOME HEALTH SERVICES | Facility: HOSPITAL | Age: 68
End: 2023-02-25
Payer: MEDICARE

## 2023-03-01 ENCOUNTER — OFFICE VISIT (OUTPATIENT)
Dept: FAMILY MEDICINE | Facility: CLINIC | Age: 68
End: 2023-03-01
Payer: MEDICARE

## 2023-03-01 VITALS
HEART RATE: 59 BPM | BODY MASS INDEX: 29.17 KG/M2 | RESPIRATION RATE: 16 BRPM | SYSTOLIC BLOOD PRESSURE: 132 MMHG | DIASTOLIC BLOOD PRESSURE: 86 MMHG | HEIGHT: 67 IN | OXYGEN SATURATION: 93 % | WEIGHT: 185.88 LBS

## 2023-03-01 DIAGNOSIS — I10 PRIMARY HYPERTENSION: Chronic | ICD-10-CM

## 2023-03-01 DIAGNOSIS — T14.8XXA HEMATOMA: Primary | ICD-10-CM

## 2023-03-01 PROCEDURE — 99214 OFFICE O/P EST MOD 30 MIN: CPT | Mod: S$PBB,,, | Performed by: STUDENT IN AN ORGANIZED HEALTH CARE EDUCATION/TRAINING PROGRAM

## 2023-03-01 PROCEDURE — 99214 OFFICE O/P EST MOD 30 MIN: CPT | Mod: PBBFAC,PN | Performed by: STUDENT IN AN ORGANIZED HEALTH CARE EDUCATION/TRAINING PROGRAM

## 2023-03-01 PROCEDURE — 99214 PR OFFICE/OUTPT VISIT, EST, LEVL IV, 30-39 MIN: ICD-10-PCS | Mod: S$PBB,,, | Performed by: STUDENT IN AN ORGANIZED HEALTH CARE EDUCATION/TRAINING PROGRAM

## 2023-03-01 PROCEDURE — 99999 PR PBB SHADOW E&M-EST. PATIENT-LVL IV: ICD-10-PCS | Mod: PBBFAC,,, | Performed by: STUDENT IN AN ORGANIZED HEALTH CARE EDUCATION/TRAINING PROGRAM

## 2023-03-01 PROCEDURE — 99999 PR PBB SHADOW E&M-EST. PATIENT-LVL IV: CPT | Mod: PBBFAC,,, | Performed by: STUDENT IN AN ORGANIZED HEALTH CARE EDUCATION/TRAINING PROGRAM

## 2023-03-01 RX ORDER — MUPIROCIN 20 MG/G
OINTMENT TOPICAL 3 TIMES DAILY
Qty: 30 G | Refills: 0 | Status: SHIPPED | OUTPATIENT
Start: 2023-03-01

## 2023-03-01 NOTE — ASSESSMENT & PLAN NOTE
BP Readings from Last 3 Encounters:   03/01/23 (!) 146/80   02/23/23 111/64   01/11/23 132/78     Repeat and monitor  Continue  Hypertension Medications             hydroCHLOROthiazide (HYDRODIURIL) 12.5 MG Tab Take 1 tablet (12.5 mg total) by mouth every Mon, Wed, Fri.    metoprolol succinate (TOPROL-XL) 100 MG 24 hr tablet Take 100 mg by mouth once daily. Pt takes 1/2 tab daily           No

## 2023-03-01 NOTE — PATIENT INSTRUCTIONS

## 2023-03-01 NOTE — PROGRESS NOTES
Subjective:       Patient ID: Alessia Nelson is a 67 y.o. female.    Chief Complaint: sore on abdomen (1 week, scratched started bleeding, yesterday noticed it was draining )    Abdominal hematoma  Her dog jumped off of her abdomen  Started as a bruise and then opened in two different areas and drained some dark blood  Slow to heal and has some erythema of the area  No further drainage. No fevers.      Review of Systems   Constitutional:  Negative for activity change, appetite change, chills and fever.   Respiratory:  Negative for shortness of breath.    Cardiovascular:  Negative for chest pain.   Gastrointestinal:  Negative for abdominal pain.   Genitourinary:  Negative for dysuria.   Integumentary:  Positive for wound. Negative for rash.   Psychiatric/Behavioral:  Negative for sleep disturbance.        Objective:      Physical Exam  Constitutional:       General: She is not in acute distress.     Appearance: Normal appearance. She is not ill-appearing.   Eyes:      Conjunctiva/sclera: Conjunctivae normal.   Cardiovascular:      Rate and Rhythm: Normal rate and regular rhythm.      Heart sounds: Normal heart sounds. No murmur heard.  Pulmonary:      Effort: Pulmonary effort is normal. No respiratory distress.      Breath sounds: Normal breath sounds.   Musculoskeletal:      Right lower leg: No edema.      Left lower leg: No edema.   Skin:     General: Skin is warm and dry.          Neurological:      Mental Status: She is alert. Mental status is at baseline.      Gait: Gait normal.   Psychiatric:         Mood and Affect: Mood normal.         Behavior: Behavior normal.         Thought Content: Thought content normal.         Judgment: Judgment normal.           Assessment:       1. Hematoma    2. Primary hypertension          Plan:       Problem List Items Addressed This Visit          Cardiac/Vascular    HTN (hypertension), onset in her 20s (Chronic)     BP Readings from Last 3 Encounters:   03/01/23 (!) 146/80    02/23/23 111/64   01/11/23 132/78   Repeat and monitor  Continue  Hypertension Medications               hydroCHLOROthiazide (HYDRODIURIL) 12.5 MG Tab Take 1 tablet (12.5 mg total) by mouth every Mon, Wed, Fri.    metoprolol succinate (TOPROL-XL) 100 MG 24 hr tablet Take 100 mg by mouth once daily. Pt takes 1/2 tab daily             Other Visit Diagnoses       Hematoma    -  Primary    looks like it is draining. warm compresses. monitor for need for oral antibiotics    Relevant Medications    mupirocin (BACTROBAN) 2 % ointment

## 2023-03-02 ENCOUNTER — PATIENT OUTREACH (OUTPATIENT)
Dept: ADMINISTRATIVE | Facility: HOSPITAL | Age: 68
End: 2023-03-02
Payer: MEDICARE

## 2023-03-02 NOTE — PROGRESS NOTES
Population Health chart review completed, no outreach sent at this time. Noted pt has upcoming appt with Dr. DESI Calvert on 03/14/2023. Updated care team at this time.

## 2023-03-04 ENCOUNTER — DOCUMENT SCAN (OUTPATIENT)
Dept: HOME HEALTH SERVICES | Facility: HOSPITAL | Age: 68
End: 2023-03-04
Payer: MEDICARE

## 2023-03-14 ENCOUNTER — OFFICE VISIT (OUTPATIENT)
Dept: FAMILY MEDICINE | Facility: CLINIC | Age: 68
End: 2023-03-14
Payer: MEDICARE

## 2023-03-14 VITALS
HEIGHT: 67 IN | WEIGHT: 182 LBS | BODY MASS INDEX: 28.56 KG/M2 | DIASTOLIC BLOOD PRESSURE: 80 MMHG | HEART RATE: 66 BPM | OXYGEN SATURATION: 99 % | RESPIRATION RATE: 15 BRPM | SYSTOLIC BLOOD PRESSURE: 130 MMHG

## 2023-03-14 DIAGNOSIS — R29.6 MULTIPLE FALLS: ICD-10-CM

## 2023-03-14 DIAGNOSIS — K86.1 CHRONIC PANCREATITIS, UNSPECIFIED PANCREATITIS TYPE: ICD-10-CM

## 2023-03-14 DIAGNOSIS — M46.1 SACROILIITIS: ICD-10-CM

## 2023-03-14 DIAGNOSIS — R10.9 ABDOMINAL PAIN, UNSPECIFIED ABDOMINAL LOCATION: ICD-10-CM

## 2023-03-14 DIAGNOSIS — M51.36 DEGENERATIVE DISC DISEASE, LUMBAR: ICD-10-CM

## 2023-03-14 DIAGNOSIS — N18.32 STAGE 3B CHRONIC KIDNEY DISEASE: ICD-10-CM

## 2023-03-14 DIAGNOSIS — E78.2 MIXED HYPERLIPIDEMIA: ICD-10-CM

## 2023-03-14 DIAGNOSIS — M79.7 FIBROMYALGIA: ICD-10-CM

## 2023-03-14 DIAGNOSIS — Z86.73 HISTORY OF TIA (TRANSIENT ISCHEMIC ATTACK): ICD-10-CM

## 2023-03-14 DIAGNOSIS — M10.9 GOUT, UNSPECIFIED CAUSE, UNSPECIFIED CHRONICITY, UNSPECIFIED SITE: ICD-10-CM

## 2023-03-14 DIAGNOSIS — E03.9 HYPOTHYROIDISM, UNSPECIFIED TYPE: ICD-10-CM

## 2023-03-14 DIAGNOSIS — I10 PRIMARY HYPERTENSION: Chronic | ICD-10-CM

## 2023-03-14 DIAGNOSIS — I50.22 CHRONIC SYSTOLIC CONGESTIVE HEART FAILURE: ICD-10-CM

## 2023-03-14 DIAGNOSIS — I25.10 CORONARY ARTERY DISEASE INVOLVING NATIVE CORONARY ARTERY OF NATIVE HEART WITHOUT ANGINA PECTORIS: ICD-10-CM

## 2023-03-14 DIAGNOSIS — F33.0 MILD EPISODE OF RECURRENT MAJOR DEPRESSIVE DISORDER: ICD-10-CM

## 2023-03-14 DIAGNOSIS — K86.89 PANCREATIC INSUFFICIENCY: Chronic | ICD-10-CM

## 2023-03-14 DIAGNOSIS — E03.4 HYPOTHYROIDISM DUE TO ACQUIRED ATROPHY OF THYROID: ICD-10-CM

## 2023-03-14 DIAGNOSIS — G24.3 ISOLATED CERVICAL DYSTONIA: ICD-10-CM

## 2023-03-14 DIAGNOSIS — E31.20 MEN (MULTIPLE ENDOCRINE NEOPLASIA): Primary | Chronic | ICD-10-CM

## 2023-03-14 DIAGNOSIS — J44.9 CHRONIC OBSTRUCTIVE PULMONARY DISEASE, UNSPECIFIED COPD TYPE: Chronic | ICD-10-CM

## 2023-03-14 DIAGNOSIS — Z85.828 HISTORY OF MALIGNANT NEOPLASM OF SKIN: ICD-10-CM

## 2023-03-14 DIAGNOSIS — K21.9 GASTROESOPHAGEAL REFLUX DISEASE, UNSPECIFIED WHETHER ESOPHAGITIS PRESENT: Chronic | ICD-10-CM

## 2023-03-14 DIAGNOSIS — D64.9 ANEMIA, UNSPECIFIED TYPE: ICD-10-CM

## 2023-03-14 PROBLEM — F41.1 GENERALIZED ANXIETY DISORDER: Status: ACTIVE | Noted: 2023-03-14

## 2023-03-14 PROBLEM — K52.9 ENTEROCOLITIS: Status: RESOLVED | Noted: 2020-03-05 | Resolved: 2023-03-14

## 2023-03-14 PROBLEM — F32.A DEPRESSIVE DISORDER: Status: RESOLVED | Noted: 2023-03-14 | Resolved: 2023-03-14

## 2023-03-14 PROBLEM — M54.59 PAIN OF LUMBAR FACET JOINT: Status: ACTIVE | Noted: 2023-03-14

## 2023-03-14 PROBLEM — G45.9 TIA (TRANSIENT ISCHEMIC ATTACK): Status: RESOLVED | Noted: 2020-12-14 | Resolved: 2023-03-14

## 2023-03-14 PROBLEM — F32.A DEPRESSIVE DISORDER: Status: ACTIVE | Noted: 2023-03-14

## 2023-03-14 PROBLEM — K85.90 ACUTE PANCREATITIS: Status: RESOLVED | Noted: 2019-04-19 | Resolved: 2023-03-14

## 2023-03-14 PROBLEM — R39.9 LOWER URINARY TRACT SYMPTOMS (LUTS): Status: RESOLVED | Noted: 2019-11-13 | Resolved: 2023-03-14

## 2023-03-14 PROBLEM — G43.709 NON-REFRACTORY CHRONIC MIGRAINE WITHOUT AURA: Status: ACTIVE | Noted: 2023-03-14

## 2023-03-14 PROBLEM — K20.90 GASTROESOPHAGITIS: Status: RESOLVED | Noted: 2019-05-16 | Resolved: 2023-03-14

## 2023-03-14 PROBLEM — R10.11 ABDOMINAL DISCOMFORT IN RIGHT UPPER QUADRANT: Status: RESOLVED | Noted: 2019-06-06 | Resolved: 2023-03-14

## 2023-03-14 PROBLEM — A04.72 C. DIFFICILE COLITIS: Status: RESOLVED | Noted: 2020-03-06 | Resolved: 2023-03-14

## 2023-03-14 PROBLEM — N12 PYELONEPHRITIS: Status: RESOLVED | Noted: 2019-10-17 | Resolved: 2023-03-14

## 2023-03-14 PROBLEM — K52.9 CHRONIC DIARRHEA: Status: RESOLVED | Noted: 2019-06-06 | Resolved: 2023-03-14

## 2023-03-14 PROBLEM — K29.70 GASTROESOPHAGITIS: Status: RESOLVED | Noted: 2019-05-16 | Resolved: 2023-03-14

## 2023-03-14 PROBLEM — M54.16 LUMBAR RADICULOPATHY: Status: ACTIVE | Noted: 2023-03-14

## 2023-03-14 PROBLEM — E78.5 DYSLIPIDEMIA: Chronic | Status: RESOLVED | Noted: 2019-06-06 | Resolved: 2023-03-14

## 2023-03-14 PROCEDURE — 99214 OFFICE O/P EST MOD 30 MIN: CPT | Mod: S$PBB,,, | Performed by: STUDENT IN AN ORGANIZED HEALTH CARE EDUCATION/TRAINING PROGRAM

## 2023-03-14 PROCEDURE — 99999 PR PBB SHADOW E&M-EST. PATIENT-LVL V: CPT | Mod: PBBFAC,,, | Performed by: STUDENT IN AN ORGANIZED HEALTH CARE EDUCATION/TRAINING PROGRAM

## 2023-03-14 PROCEDURE — 99214 PR OFFICE/OUTPT VISIT, EST, LEVL IV, 30-39 MIN: ICD-10-PCS | Mod: S$PBB,,, | Performed by: STUDENT IN AN ORGANIZED HEALTH CARE EDUCATION/TRAINING PROGRAM

## 2023-03-14 PROCEDURE — 99999 PR PBB SHADOW E&M-EST. PATIENT-LVL V: ICD-10-PCS | Mod: PBBFAC,,, | Performed by: STUDENT IN AN ORGANIZED HEALTH CARE EDUCATION/TRAINING PROGRAM

## 2023-03-14 PROCEDURE — 99215 OFFICE O/P EST HI 40 MIN: CPT | Mod: PBBFAC,PN | Performed by: STUDENT IN AN ORGANIZED HEALTH CARE EDUCATION/TRAINING PROGRAM

## 2023-03-14 RX ORDER — ALLOPURINOL 300 MG/1
300 TABLET ORAL DAILY
Qty: 90 TABLET | Refills: 3 | Status: SHIPPED | OUTPATIENT
Start: 2023-03-14 | End: 2024-02-13

## 2023-03-14 NOTE — ASSESSMENT & PLAN NOTE
Stable. Continue current medications and regular followup.  BP Readings from Last 3 Encounters:   03/14/23 130/80   03/01/23 132/86   02/23/23 111/64

## 2023-03-14 NOTE — ASSESSMENT & PLAN NOTE
Lab Results   Component Value Date    WBC 7.39 07/26/2022    HGB 11.3 (L) 07/26/2022    HCT 36.0 (L) 07/26/2022    MCV 91 07/26/2022     07/26/2022       Stable. asymptomatic

## 2023-03-14 NOTE — ASSESSMENT & PLAN NOTE
Stable. Continue current medications and regular followup.  Hyperlipidemia Medications             colestipoL (COLESTID) 1 gram Tab Take 2 tablets (2 g total) by mouth once daily.

## 2023-03-14 NOTE — PROGRESS NOTES
Subjective:       Patient ID: Alessia Nelson is a 67 y.o. female.    Chief Complaint: Establish Care    Establish care for  Patient Active Problem List:     Sepsis with metabolic encephalopathy     CHF, chronic     HTN (hypertension), onset in her 20s     COPD (chronic obstructive pulmonary disease)     Nicotine dependence     Diarrhea, chronic     Allergic rhinitis     Mild episode of recurrent major depressive disorder     Fibromyalgia     GERD (gastroesophageal reflux disease)     Hypothyroidism     Hyperlipidemia     History of malignant neoplasm of skin     MEN (multiple endocrine neoplasia)     Peripheral neuropathy     Poikiloderma     Pseudophakia     Rotator cuff injury     Tendinosis     Metatarsalgia     Dry eyes     Class 1 obesity due to excess calories with serious comorbidity and body mass index (BMI) of 30.0 to 30.9 in adult, today 29.1     Chronic bilateral low back pain without sciatica     Contusion of tail of pancreas     Bile duct abnormality     Gastroesophagitis     Lumbosacral spondylosis     S/P drug eluting coronary stent placement, 8/2017     Family history of premature CAD     Syncope and collapse, onset 2015, about 10 times, one episode noted hypotension at doctor office 9/2017     Multiple falls, started 9/2018, 5 times usually on standing     Excessive caffeine intake     Orthostatic hypotension     Abdominal obesity     Aspirin intolerance     LVH (left ventricular hypertrophy) due to hypertensive disease, with heart failure     Abdominal discomfort in right upper quadrant     Chronic diarrhea     History of cholecystectomy     Diverticulosis of large intestine without hemorrhage     Abnormal computerized tomography of biliary tract     History of pancreatitis     Abdominal pain     Nausea     Primary osteoarthritis of right knee     Degenerative tear of medial meniscus of right knee     Baker's cyst of knee, right     Pyelonephritis     Chronic pain of right knee     Unilateral  primary osteoarthritis, right knee     CAD (coronary artery disease)     History of TIA (transient ischemic attack)     Status post right knee replacement     Anemia     Enterocolitis     Hypomagnesemia     C. difficile colitis     Bradycardia     Chronic pancreatitis     Colon cancer screening     B12 deficiency     History of Clostridium difficile infection     Abnormal finding on GI tract imaging     Pancreatic insufficiency     Degenerative disc disease, lumbar     Current smoker     Failed back syndrome of lumbar spine     Chronic cervical pain     Activity intolerance     Sacroiliac joint dysfunction     Hypothyroidism due to acquired atrophy of thyroid     Pain of lumbar facet joint     Non-refractory chronic migraine without aura     Lumbar radiculopathy     Isolated cervical dystonia     Gout     Generalized anxiety disorder    Current Outpatient Medications:  acetaminophen (TYLENOL) 325 MG tablet, Take 2 tablets (650 mg total) by mouth every 6 (six) hours as needed for Pain (Do not take with any other tylenol containing products).  allopurinoL (ZYLOPRIM) 300 MG tablet, Take 1 tablet (300 mg total) by mouth once daily.  baclofen (LIORESAL) 10 MG tablet, Take 1 tablet (10 mg total) by mouth 3 (three) times daily as needed (muscle spasms).  buPROPion (WELLBUTRIN XL) 150 MG TB24 tablet, Take 1 tablet (150 mg total) by mouth once daily.  clopidogreL (PLAVIX) 75 mg tablet, TAKE 1 TABLET EVERY DAY  colestipoL (COLESTID) 1 gram Tab, Take 2 tablets (2 g total) by mouth once daily.  cyanocobalamin 1,000 mcg/mL injection, Inject 1 mL (1,000 mcg total) into the muscle every 14 (fourteen) days.  EScitalopram oxalate (LEXAPRO) 20 MG tablet, Take 1 tablet (20 mg total) by mouth every evening.  gabapentin (NEURONTIN) 400 MG capsule, Take 1 capsule (400 mg total) by mouth 2 (two) times daily.  hydroCHLOROthiazide (HYDRODIURIL) 12.5 MG Tab, Take 1 tablet (12.5 mg total) by mouth every Mon, Wed, Fri.  KLOR-CON M20 20 mEq  tablet, TAKE 1 TABLET DAILY  levothyroxine (SYNTHROID) 75 MCG tablet, Take 1 tablet (75 mcg total) by mouth once daily.  magnesium oxide (MAG-OX) 400 mg (241.3 mg magnesium) tablet, Take 1 tablet (400 mg total) by mouth once daily.  metoprolol succinate (TOPROL-XL) 100 MG 24 hr tablet, Take 100 mg by mouth once daily. Pt takes 1/2 tab daily  mupirocin (BACTROBAN) 2 % ointment, Apply topically 3 (three) times daily.  onabotulinumtoxina (BOTOX) 200 unit SolR, as directed  ondansetron (ZOFRAN-ODT) 4 MG TbDL, Take 2 tablets (8 mg total) by mouth every 8 (eight) hours as needed (nausea).  oxyCODONE-acetaminophen (PERCOCET)  mg per tablet, Take 1 tablet by mouth every 8 (eight) hours as needed for Pain.  oxyCODONE-acetaminophen (PERCOCET)  mg per tablet, Take 1 tablet by mouth every 8 (eight) hours as needed for Pain.  pantoprazole (PROTONIX) 40 MG tablet, Take 1 tablet (40 mg total) by mouth once daily.  traZODone (DESYREL) 100 MG tablet, Take 1 tablet (100 mg total) by mouth every evening.  ZENPEP 10,000-32,000 -42,000 unit CpDR, Take 1 capsule by mouth 3 (three) times daily with meals.    No current facility-administered medications for this visit.  Facility-Administered Medications Ordered in Other Visits:  electrolyte-S (ISOLYTE)  fentaNYL 50 mcg/mL injection 25 mcg  HYDROmorphone injection 0.2 mg  lactated ringers infusion  lactated ringers infusion  lactated ringers infusion  LIDOcaine (PF) 10 mg/ml (1%) injection 10 mg  lorazepam injection 0.25 mg  oxyCODONE immediate release tablet 5 mg  prochlorperazine injection Soln 5 mg  sodium chloride 0.9% flush 3 mL    specialist  Pain- Dr. Carroll  Cardiology- Dr Walsh  Gastroenterology/pancreas specialist- [facility]  Orthopedics/spine center- [facility]  Endocrine- does not have one recently.   Derm- does not see dermatology and does not want one.     Review of Systems   Constitutional:  Positive for fatigue. Negative for activity change, appetite change and unexpected  weight change.   HENT:  Negative for trouble swallowing.    Eyes:  Negative for visual disturbance.   Respiratory:  Negative for shortness of breath.    Cardiovascular:  Negative for chest pain and leg swelling.   Gastrointestinal:  Negative for abdominal pain, blood in stool, change in bowel habit and change in bowel habit.   Endocrine: Negative for cold intolerance, heat intolerance, polydipsia and polyuria.   Genitourinary:  Negative for dysuria and hematuria.   Musculoskeletal:  Positive for back pain, gait problem and neck pain. Negative for arthralgias.   Integumentary:  Negative for rash and wound.   Neurological:  Negative for dizziness, weakness and headaches.   Psychiatric/Behavioral:  Positive for sleep disturbance. Negative for dysphoric mood. The patient is not nervous/anxious.        Objective:      Physical Exam  Constitutional:       General: She is not in acute distress.     Appearance: Normal appearance. She is not ill-appearing.   Eyes:      Conjunctiva/sclera: Conjunctivae normal.   Cardiovascular:      Rate and Rhythm: Normal rate and regular rhythm.      Heart sounds: Normal heart sounds. No murmur heard.  Pulmonary:      Effort: Pulmonary effort is normal. No respiratory distress.      Breath sounds: Normal breath sounds.   Abdominal:      Palpations: Abdomen is soft.   Musculoskeletal:      Right lower leg: No edema.      Left lower leg: No edema.   Skin:     General: Skin is warm and dry.   Neurological:      Mental Status: She is alert. Mental status is at baseline.      Gait: Gait normal.   Psychiatric:         Mood and Affect: Mood normal.         Behavior: Behavior normal.         Thought Content: Thought content normal.         Judgment: Judgment normal.       Assessment:       1. MEN (multiple endocrine neoplasia)    2. Hypothyroidism, unspecified type    3. Gout, unspecified cause, unspecified chronicity, unspecified site    4. History of TIA (transient ischemic attack)    5.  Degenerative disc disease, lumbar    6. Isolated cervical dystonia    7. Mild episode of recurrent major depressive disorder    8. Chronic obstructive pulmonary disease, unspecified COPD type    9. Primary hypertension    10. Chronic systolic congestive heart failure    11. Mixed hyperlipidemia    12. Coronary artery disease involving native coronary artery of native heart without angina pectoris    13. History of malignant neoplasm of skin    14. Anemia, unspecified type    15. Hypothyroidism due to acquired atrophy of thyroid    16. Gastroesophageal reflux disease, unspecified whether esophagitis present    17. Pancreatic insufficiency    18. Abdominal pain, unspecified abdominal location    19. Chronic pancreatitis, unspecified pancreatitis type    20. Fibromyalgia    21. Multiple falls, started 9/2018, 5 times usually on standing    22. Stage 3b chronic kidney disease    23. Sacroiliitis        Plan:       Problem List Items Addressed This Visit          Neuro    History of TIA (transient ischemic attack)     Stable. Continue current medications and regular followup.  Not seeing neurology         Degenerative disc disease, lumbar     Chronic stable issue. Sees pain management         Isolated cervical dystonia     Chronic worsening issues. Sees spine and pain management            Psychiatric    Mild episode of recurrent major depressive disorder     Reports this is a bit better recently.  Continue current managmenet            Pulmonary    COPD (chronic obstructive pulmonary disease) (Chronic)     Not following with pulm. Chronic sob but better now that she stopped smoking            Cardiac/Vascular    HTN (hypertension), onset in her 20s (Chronic)     Stable. Continue current medications and regular followup.  BP Readings from Last 3 Encounters:   03/14/23 130/80   03/01/23 132/86   02/23/23 111/64              CHF, chronic     Stable. Continue current medications and regular followup.  Seeing cardiology          Hyperlipidemia     Stable. Continue current medications and regular followup.  Hyperlipidemia Medications               colestipoL (COLESTID) 1 gram Tab Take 2 tablets (2 g total) by mouth once daily.                   CAD (coronary artery disease)     Continue risk factor management and cardiology followup            Renal/    Stage 3b chronic kidney disease     Stable. Continue current medications and regular followup.              Oncology    History of malignant neoplasm of skin     Does not want to follow with derm currently         Anemia     Lab Results   Component Value Date    WBC 7.39 07/26/2022    HGB 11.3 (L) 07/26/2022    HCT 36.0 (L) 07/26/2022    MCV 91 07/26/2022     07/26/2022     Stable. asymptomatic            Endocrine    MEN (multiple endocrine neoplasia) - Primary (Chronic)     Not following with endocrine         Relevant Orders    Ambulatory referral/consult to Endocrinology    Hypothyroidism     Not following with endocrine.  Needs referral         Relevant Orders    Ambulatory referral/consult to Endocrinology    Hypothyroidism due to acquired atrophy of thyroid       GI    GERD (gastroesophageal reflux disease) (Chronic)     Stable  Sees GI at [facility]         Pancreatic insufficiency (Chronic)     Stable  Sees pancreatic team at [facility]         Abdominal pain     Chronic ongoing. Unchanged  Follows with GI and pancreas team at [facility]         Chronic pancreatitis     Has a team at [facility]. stable            Orthopedic    Fibromyalgia     persistant and worsening.  Sees her team at [facility]         Sacroiliitis    Gout     No flairs on allopurinol.  Refill  Lab Results   Component Value Date    URICACID 11.2 (H) 12/20/2013              Relevant Medications    allopurinoL (ZYLOPRIM) 300 MG tablet       Other    Multiple falls, started 9/2018, 5 times usually on standing     No recent falls. stable

## 2023-03-14 NOTE — ASSESSMENT & PLAN NOTE
No flairs on allopurinol.  Refill  Lab Results   Component Value Date    URICACID 11.2 (H) 12/20/2013

## 2023-03-27 ENCOUNTER — OFFICE VISIT (OUTPATIENT)
Dept: PAIN MEDICINE | Facility: CLINIC | Age: 68
End: 2023-03-27
Payer: MEDICARE

## 2023-03-27 VITALS — DIASTOLIC BLOOD PRESSURE: 65 MMHG | HEART RATE: 57 BPM | SYSTOLIC BLOOD PRESSURE: 117 MMHG

## 2023-03-27 DIAGNOSIS — M54.12 CERVICAL RADICULOPATHY: Primary | ICD-10-CM

## 2023-03-27 DIAGNOSIS — Z79.891 CHRONIC USE OF OPIATE FOR THERAPEUTIC PURPOSE: ICD-10-CM

## 2023-03-27 DIAGNOSIS — M51.36 DDD (DEGENERATIVE DISC DISEASE), LUMBAR: ICD-10-CM

## 2023-03-27 DIAGNOSIS — M50.30 DDD (DEGENERATIVE DISC DISEASE), CERVICAL: ICD-10-CM

## 2023-03-27 DIAGNOSIS — G89.29 ACUTE EXACERBATION OF CHRONIC LOW BACK PAIN: ICD-10-CM

## 2023-03-27 DIAGNOSIS — G89.4 CHRONIC PAIN DISORDER: ICD-10-CM

## 2023-03-27 DIAGNOSIS — M54.50 ACUTE EXACERBATION OF CHRONIC LOW BACK PAIN: ICD-10-CM

## 2023-03-27 PROCEDURE — 99214 OFFICE O/P EST MOD 30 MIN: CPT | Mod: S$PBB,,,

## 2023-03-27 PROCEDURE — 99999 PR PBB SHADOW E&M-EST. PATIENT-LVL IV: CPT | Mod: PBBFAC,,,

## 2023-03-27 PROCEDURE — 99999 PR PBB SHADOW E&M-EST. PATIENT-LVL IV: ICD-10-PCS | Mod: PBBFAC,,,

## 2023-03-27 PROCEDURE — 80326 AMPHETAMINES 5 OR MORE: CPT

## 2023-03-27 PROCEDURE — 99214 OFFICE O/P EST MOD 30 MIN: CPT | Mod: PBBFAC,PO

## 2023-03-27 PROCEDURE — 99214 PR OFFICE/OUTPT VISIT, EST, LEVL IV, 30-39 MIN: ICD-10-PCS | Mod: S$PBB,,,

## 2023-03-27 RX ORDER — METHYLPREDNISOLONE 4 MG/1
TABLET ORAL
Qty: 21 EACH | Refills: 0 | Status: SHIPPED | OUTPATIENT
Start: 2023-03-27 | End: 2023-04-17

## 2023-03-27 RX ORDER — OXYCODONE AND ACETAMINOPHEN 10; 325 MG/1; MG/1
1 TABLET ORAL EVERY 8 HOURS PRN
Qty: 90 TABLET | Refills: 0 | Status: SHIPPED | OUTPATIENT
Start: 2023-03-29 | End: 2023-05-04 | Stop reason: SDUPTHER

## 2023-03-27 NOTE — PROGRESS NOTES
"This note was completed with dictation software and grammatical errors may exist.      FOLLOW UP NOTE:     CHIEF COMPLAINT: neck and low back pain    INTERVAL HISTORY: Alessia Nelson is a 67 y.o. female with PMH significant for hx of cervical spine surgery, hx of lumbar spine surgery X 3, hx of peritonitis requiring bowel resection, and torticollis (per patient report) presents as an established patient for the continued management of back, hip, and neck pain. Today, the patient localizes the worse of her pain to her neck and upper back. The patient is s/p botox injections with Dr. Carroll on 2/23/23 and she reports of mild improvement of pain. The patient reports her upper back is the worse of her pain today. The patient localizes her pain to the area between her shoulder blades.  The patient reports the pain is bilateral; R>L. The patient also reports of her neck "feeling tight." The patient also continues to report of neck pain with radiation into BUE and reports of numbness and tingling into hands and fingers.  Of note, the patient denies benefit of SCS.   She reports that she is taking percocet  mg PO PRN with benefit.  The patient also reports of bilateral jaw pain. The patient reports of radiation of her neck pain to her jaw.   The patient denies of any significant changes in her health since her last appointment. The patient also denies of any changes in the character of her pain since her last appointment. The patient reports that her current pain is a 8/10. Patient denies of any urinary/fecal incontinence, saddle anesthesia, or weakness.     PRIOR HPI: Alessia Nelson is a 65 y.o. female with PMH significant for hx of cervical spine surgery, hx of lumbar spine surgery X 3, hx of peritonitis requiring bowel resection, and torticollis (per patient report) presents as an established patient of Dr. Aragon (new to me) for the evaluation of low back and neck pain. The patient reports that she has been in " pain for over twenty years. She reports that she had her neck surgery in 2006/2007 in [place]. She reports of benefit in regards to her neck pain. The patient reports of her initial lumbar spine surgery in 2007/2008 in [place] as well. She reports of improvement after her initial surgery. She reports that she had a second lumbar spine surgery in 2008/2009 as her pain recurred. She reports of benefit with this surgery as well. She reports of a third lumbar spine surgery in 2012. She reports of doing well for a couple years until she fell out of her bed. The patient reports that her pain has been worsening ever since then. Low back pain > neck pain. The patient localizes her pain to the area across her lower back.  The patient reports of radiation down her RLE to her knee. The patient reports of numbness in her feet and hands bilaterally. The patient describes her pain as a sharp and aching type of pain. The patient reports that her pain is worsened with any activity that requires walking and activities such as doing laundry. The patient reports of benefit with pain medication. The patient reports that her current pain is a 6/10. Patient denies of any urinary/fecal incontinence, saddle anesthesia, or weakness.      In regards to her neck pain, the patient localizes her pain to the middle/right side of her neck. The patient reports of radiation down her RUE to her elbow. The patient reports that her current pain in her neck is a 6/10. Of note, the patient reports that she would be willing to undergo surgery if she has to. I have updated the patient's current pain regimen below.         INTERVENTIONAL PAIN HISTORY:  12/8/2021: C7-T1 cervical interlaminar epidural steroid injection - no relief  9/22/2021:  Right sacroiliac joint injection - significant relief  6/18/2021: Insertion of SCS (Nevro)   6/7/2021: SCS Trial - 70 - 80% benefit  3/1/2021: Caudal epidural steroid injection - no benefit   6/10/2019: L5-S3 Right Medial  Branch Nerve Coolief thermal Radiofrequency Ablation via Dr. Aragon - no benefit per chart review     CURRENT PAIN MEDICATIONS:   Percocet  mg PO TID  OTC Tylenol   Gabapentin 400 mg PO BID  Baclofen 10 mg PO BID/TID  No longer taking:     tizanidine causes dry mouth.           ROS:  Review of Systems   Constitutional:  Negative for chills and fever.   HENT:  Negative for sore throat.    Eyes:  Negative for visual disturbance.   Respiratory:  Negative for shortness of breath.    Cardiovascular:  Negative for chest pain.   Gastrointestinal:  Negative for nausea and vomiting.   Genitourinary:  Negative for difficulty urinating.   Musculoskeletal:  Positive for arthralgias, back pain, gait problem and neck pain.   Skin:  Negative for rash.   Allergic/Immunologic: Negative for immunocompromised state.   Neurological:  Positive for numbness. Negative for syncope.   Hematological:  Does not bruise/bleed easily.   Psychiatric/Behavioral:  Negative for suicidal ideas.    All other systems reviewed and are negative.     MEDICAL, SURGICAL, FAMILY, SOCIAL HX: reviewed    MEDICATIONS/ALLERGIES: reviewed    PHYSICAL EXAM:    VITALS: Vitals reviewed.   Vitals:    03/27/23 1010   BP: 117/65   Pulse: (!) 57   PainSc:   8              UPPER EXTREMITIES: Normal alignment, normal range of motion, no atrophy, no skin changes,  hair growth and nail growth normal and equal bilaterally. No swelling, no tenderness.    LOWER EXTREMITIES:  Normal alignment, normal range of motion, no atrophy, no skin changes,  hair growth and nail growth normal and equal bilaterally. No swelling, no tenderness.     CERVICAL SPINE:  Cervical spine: ROM is full in flexion, extension and lateral rotation with increased pain with passive ROM and lateral rotation.   Spurling's maneuver - deferred given prior fusion  Myofascial exam: Tenderness to palpation across cervical paraspinous region bilaterally. Prior anterior scar is well-healed.      LUMBAR SPINE:   "  Lumbar spine: ROM is limited with flexion extension and oblique extension with increased pain to any maneuvar.    ((+)) Supine straight leg raise R > L  ((+)) Facet loading bilateral  Internal and external rotation of the hip causes no increased pain on either side.  Myofascial exam: Tenderness to palpation across lumbar paraspinous muscles.     ((+)) TTP at the SI joint bilaterally  ((+)) MIKEY's test  ((+)) One leg stand    ((+)) Distraction test    MOTOR: Tone and bulk: normal bilateral upper and lower Strength: normal    SENSATION: Light touch and pinprick intact bilaterally  REFLEXES: normal, symmetric, nonbrisk.  Toes down, no clonus. Negative hilliard's sign bilaterally.  GAIT: normal rise, base, steps, and arm swing.      IMAGING: EMG - 8/11/2022 (Completed at [facility])     " IMPRESSION:   This is an abnormal study.  No electrodiagnostic evidence of neuropathy of left upper extremity of nerves tested or left cervical radiculopathy at this time.  Electrodiagnostic evidence of chronic bilateral lower extremity polyneuropathy in areas tested. Sensory, motor, demyelinating, axonal. Hx of hypothyroidism.   No electrodiagnostic evidence of acute bilateral lumbosacral radiculopathy at this time.     ASSESSMENT:   Patient presents with   1. Cervical radiculopathy    2. Chronic use of opiate for therapeutic purpose    3. DDD (degenerative disc disease), lumbar    4. DDD (degenerative disc disease), cervical    5. Acute exacerbation of chronic low back pain    6. Chronic pain disorder            PLAN:  I have stressed the importance of physical activity and exercise to improve overall health  Reviewed pertinent imaging and records with patient  Discussed CHICO at C6-C7 as a treatment modality for her presenting pain, pt defers ANTHONY and is not interested in interventional treatment at this time. I advised the patient that we are limited on treatment option as she is not interested in completing interventional therapies.  "   Prescribed medrol dose pack   Refilled Percocet  mg po Q8H, #90, 0 refills.  reviewed and consistent.   UDS collected today, last had pain medication yesterday.   Offered to have Nevro REP at next appt, pt declined.   Follow up in 4 weeks or sooner if needed.   All medication management performed by Dr. Carroll.    Laura Heath, NP

## 2023-03-31 LAB
6MAM UR QL: NOT DETECTED
7AMINOCLONAZEPAM UR QL: NOT DETECTED
A-OH ALPRAZ UR QL: NOT DETECTED
ALPHA-OH-MIDAZOLAM: NOT DETECTED
ALPRAZ UR QL: NOT DETECTED
AMPHET UR QL SCN: NOT DETECTED
ANNOTATION COMMENT IMP: NORMAL
ANNOTATION COMMENT IMP: NORMAL
BARBITURATES UR QL: NOT DETECTED
BUPRENORPHINE UR QL: NOT DETECTED
BZE UR QL: NOT DETECTED
CARBOXYTHC UR QL: NOT DETECTED
CARISOPRODOL UR QL: NOT DETECTED
CLONAZEPAM UR QL: NOT DETECTED
CODEINE UR QL: NOT DETECTED
CREAT UR-MCNC: 201.8 MG/DL (ref 20–400)
DIAZEPAM UR QL: NOT DETECTED
ETHYL GLUCURONIDE UR QL: NOT DETECTED
FENTANYL UR QL: NOT DETECTED
GABAPENTIN: PRESENT
HYDROCODONE UR QL: NOT DETECTED
HYDROMORPHONE UR QL: NOT DETECTED
LORAZEPAM UR QL: NOT DETECTED
MDA UR QL: NOT DETECTED
MDEA UR QL: NOT DETECTED
MDMA UR QL: NOT DETECTED
ME-PHENIDATE UR QL: NOT DETECTED
METHADONE UR QL: NOT DETECTED
METHAMPHET UR QL: NOT DETECTED
MIDAZOLAM UR QL SCN: NOT DETECTED
MORPHINE UR QL: NOT DETECTED
NALOXONE: NOT DETECTED
NORBUPRENORPHINE UR QL CFM: NOT DETECTED
NORDIAZEPAM UR QL: NOT DETECTED
NORFENTANYL UR QL: NOT DETECTED
NORHYDROCODONE UR QL CFM: NOT DETECTED
NORMEPERIDINE UR QL CFM: NOT DETECTED
NOROXYCODONE UR QL CFM: PRESENT
NOROXYMORPHONE UR QL SCN: PRESENT
OXAZEPAM UR QL: NOT DETECTED
OXYCODONE UR QL: PRESENT
OXYMORPHONE UR QL: PRESENT
PATHOLOGY STUDY: NORMAL
PCP UR QL: NOT DETECTED
PHENTERMINE UR QL: NOT DETECTED
PREGABALIN: NOT DETECTED
SERVICE CMNT-IMP: NORMAL
TAPENTADOL UR QL SCN: NOT DETECTED
TAPENTADOL UR QL SCN: NOT DETECTED
TEMAZEPAM UR QL: NOT DETECTED
TRAMADOL UR QL: NOT DETECTED
ZOLPIDEM METABOLITE: NOT DETECTED
ZOLPIDEM UR QL: NOT DETECTED

## 2023-05-03 ENCOUNTER — TELEPHONE (OUTPATIENT)
Dept: PAIN MEDICINE | Facility: CLINIC | Age: 68
End: 2023-05-03
Payer: MEDICARE

## 2023-05-03 NOTE — TELEPHONE ENCOUNTER
----- Message from Christelle Morgan sent at 5/3/2023  9:43 AM CDT -----  Contact: pt  Pt is calling wanting a call back [phone]

## 2023-05-04 ENCOUNTER — TELEPHONE (OUTPATIENT)
Dept: PAIN MEDICINE | Facility: CLINIC | Age: 68
End: 2023-05-04
Payer: MEDICARE

## 2023-05-04 DIAGNOSIS — M51.36 DDD (DEGENERATIVE DISC DISEASE), LUMBAR: ICD-10-CM

## 2023-05-04 DIAGNOSIS — M50.30 DDD (DEGENERATIVE DISC DISEASE), CERVICAL: ICD-10-CM

## 2023-05-04 DIAGNOSIS — G89.4 CHRONIC PAIN DISORDER: Primary | ICD-10-CM

## 2023-05-04 RX ORDER — OXYCODONE AND ACETAMINOPHEN 10; 325 MG/1; MG/1
1 TABLET ORAL EVERY 8 HOURS PRN
Qty: 90 TABLET | Refills: 0 | Status: SHIPPED | OUTPATIENT
Start: 2023-05-04 | End: 2023-05-10 | Stop reason: SDUPTHER

## 2023-05-04 NOTE — TELEPHONE ENCOUNTER
----- Message from Tyler Turner sent at 5/4/2023  7:07 AM CDT -----  Contact: pt at [phone]  Type: Needs Medical Advice  Who Called:  pt  Best Call Back Number: [phone]  Additional Information: pt is calling the office to reschedule her 5/4 appt and was told she could come tomorrow and the date of 5/10 comes up for next appt. Please call back and advise.

## 2023-05-10 ENCOUNTER — OFFICE VISIT (OUTPATIENT)
Dept: PAIN MEDICINE | Facility: CLINIC | Age: 68
End: 2023-05-10
Payer: MEDICARE

## 2023-05-10 VITALS
BODY MASS INDEX: 28.56 KG/M2 | HEART RATE: 60 BPM | RESPIRATION RATE: 16 BRPM | SYSTOLIC BLOOD PRESSURE: 117 MMHG | DIASTOLIC BLOOD PRESSURE: 68 MMHG | HEIGHT: 67 IN | WEIGHT: 182 LBS

## 2023-05-10 DIAGNOSIS — M50.30 DDD (DEGENERATIVE DISC DISEASE), CERVICAL: ICD-10-CM

## 2023-05-10 DIAGNOSIS — M51.36 DDD (DEGENERATIVE DISC DISEASE), LUMBAR: ICD-10-CM

## 2023-05-10 DIAGNOSIS — Z79.891 CHRONIC USE OF OPIATE FOR THERAPEUTIC PURPOSE: ICD-10-CM

## 2023-05-10 DIAGNOSIS — M96.1 POSTLAMINECTOMY SYNDROME OF LUMBAR REGION: Primary | ICD-10-CM

## 2023-05-10 DIAGNOSIS — G89.4 CHRONIC PAIN DISORDER: ICD-10-CM

## 2023-05-10 PROCEDURE — 99214 OFFICE O/P EST MOD 30 MIN: CPT | Mod: S$PBB,,,

## 2023-05-10 PROCEDURE — 99214 PR OFFICE/OUTPT VISIT, EST, LEVL IV, 30-39 MIN: ICD-10-PCS | Mod: S$PBB,,,

## 2023-05-10 PROCEDURE — 99999 PR PBB SHADOW E&M-EST. PATIENT-LVL V: ICD-10-PCS | Mod: PBBFAC,,,

## 2023-05-10 PROCEDURE — 80326 AMPHETAMINES 5 OR MORE: CPT

## 2023-05-10 PROCEDURE — 99999 PR PBB SHADOW E&M-EST. PATIENT-LVL V: CPT | Mod: PBBFAC,,,

## 2023-05-10 PROCEDURE — 99215 OFFICE O/P EST HI 40 MIN: CPT | Mod: PBBFAC,PO

## 2023-05-10 RX ORDER — PANCRELIPASE LIPASE, PANCRELIPASE PROTEASE, PANCRELIPASE AMYLASE 40000; 126000; 168000 [USP'U]/1; [USP'U]/1; [USP'U]/1
CAPSULE, DELAYED RELEASE ORAL
COMMUNITY
Start: 2023-04-05

## 2023-05-10 RX ORDER — OXYCODONE AND ACETAMINOPHEN 10; 325 MG/1; MG/1
1 TABLET ORAL EVERY 8 HOURS PRN
Qty: 90 TABLET | Refills: 0 | Status: SHIPPED | OUTPATIENT
Start: 2023-06-29 | End: 2023-07-13 | Stop reason: ALTCHOICE

## 2023-05-10 RX ORDER — OXYCODONE AND ACETAMINOPHEN 10; 325 MG/1; MG/1
1 TABLET ORAL EVERY 8 HOURS PRN
Qty: 90 TABLET | Refills: 0 | Status: SHIPPED | OUTPATIENT
Start: 2023-06-01 | End: 2023-07-13 | Stop reason: ALTCHOICE

## 2023-05-10 RX ORDER — SODIUM, POTASSIUM,MAG SULFATES 17.5-3.13G
SOLUTION, RECONSTITUTED, ORAL ORAL
COMMUNITY
Start: 2023-04-13

## 2023-05-10 NOTE — PROGRESS NOTES
"This note was completed with dictation software and grammatical errors may exist.      FOLLOW UP NOTE:     CHIEF COMPLAINT: neck and low back pain    INTERVAL HISTORY: Alessia Nelson is a 67 y.o. female with PMH significant for hx of cervical spine surgery, hx of lumbar spine surgery X 3, hx of peritonitis requiring bowel resection, and torticollis (per patient report) presents as an established patient for the continued management of back, hip, and neck pain. Today, the patient localizes the worse of her pain to her neck and upper back. The patient presents today for medication refill. The patient reports her upper back is the worse of her pain today. The patient localizes her pain to the area between her shoulder blades.  The patient reports the pain is bilateral; R>L. The patient also reports of her neck "feeling tight." The patient also continues to report of neck pain with radiation into BUE and reports of numbness and tingling into hands and fingers.  Of note, the patient denies benefit of SCS.   She reports that she is taking percocet  mg PO PRN with benefit.  The patient also reports of bilateral jaw pain. The patient reports of radiation of her neck pain to her jaw.   The patient denies of any significant changes in her health since her last appointment. The patient also denies of any changes in the character of her pain since her last appointment. The patient reports that her current pain is a 8/10. Patient denies of any urinary/fecal incontinence, saddle anesthesia, or weakness.     Of note, the patient reports ongoing GI issues and she is planning to be evaluated by her doctor at [facility]. Patient also plans to seek evaluation by Ortho and spine doctors while there.     PRIOR HPI: Alessia Nelson is a 65 y.o. female with PMH significant for hx of cervical spine surgery, hx of lumbar spine surgery X 3, hx of peritonitis requiring bowel resection, and torticollis (per patient report) presents as " an established patient of Dr. Aragon (new to me) for the evaluation of low back and neck pain. The patient reports that she has been in pain for over twenty years. She reports that she had her neck surgery in 2006/2007 in [place]. She reports of benefit in regards to her neck pain. The patient reports of her initial lumbar spine surgery in 2007/2008 in [place] as well. She reports of improvement after her initial surgery. She reports that she had a second lumbar spine surgery in 2008/2009 as her pain recurred. She reports of benefit with this surgery as well. She reports of a third lumbar spine surgery in 2012. She reports of doing well for a couple years until she fell out of her bed. The patient reports that her pain has been worsening ever since then. Low back pain > neck pain. The patient localizes her pain to the area across her lower back.  The patient reports of radiation down her RLE to her knee. The patient reports of numbness in her feet and hands bilaterally. The patient describes her pain as a sharp and aching type of pain. The patient reports that her pain is worsened with any activity that requires walking and activities such as doing laundry. The patient reports of benefit with pain medication. The patient reports that her current pain is a 6/10. Patient denies of any urinary/fecal incontinence, saddle anesthesia, or weakness.      In regards to her neck pain, the patient localizes her pain to the middle/right side of her neck. The patient reports of radiation down her RUE to her elbow. The patient reports that her current pain in her neck is a 6/10. Of note, the patient reports that she would be willing to undergo surgery if she has to. I have updated the patient's current pain regimen below.         INTERVENTIONAL PAIN HISTORY:  12/8/2021: C7-T1 cervical interlaminar epidural steroid injection - no relief  9/22/2021:  Right sacroiliac joint injection - significant relief  6/18/2021: Insertion of SCS  "(Nevro)   6/7/2021: SCS Trial - 70 - 80% benefit  3/1/2021: Caudal epidural steroid injection - no benefit   6/10/2019: L5-S3 Right Medial Branch Nerve Coolief thermal Radiofrequency Ablation via Dr. Aragon - no benefit per chart review     CURRENT PAIN MEDICATIONS:   Percocet  mg PO TID  OTC Tylenol   Gabapentin 400 mg PO BID  Baclofen 10 mg PO BID/TID  No longer taking:     tizanidine causes dry mouth.           ROS:  Review of Systems   Constitutional:  Negative for chills and fever.   HENT:  Negative for sore throat.    Eyes:  Negative for visual disturbance.   Respiratory:  Negative for shortness of breath.    Cardiovascular:  Negative for chest pain.   Gastrointestinal:  Negative for nausea and vomiting.   Genitourinary:  Negative for difficulty urinating.   Musculoskeletal:  Positive for arthralgias, back pain, gait problem and neck pain.   Skin:  Negative for rash.   Allergic/Immunologic: Negative for immunocompromised state.   Neurological:  Positive for numbness. Negative for syncope.   Hematological:  Does not bruise/bleed easily.   Psychiatric/Behavioral:  Negative for suicidal ideas.    All other systems reviewed and are negative.     MEDICAL, SURGICAL, FAMILY, SOCIAL HX: reviewed    MEDICATIONS/ALLERGIES: reviewed    PHYSICAL EXAM:    VITALS: Vitals reviewed.   Vitals:    05/10/23 1006   BP: 117/68   Pulse: 60   Resp: 16   Weight: 82.6 kg (182 lb)   Height: 5' 7" (1.702 m)   PainSc:   8              UPPER EXTREMITIES: Normal alignment, normal range of motion, no atrophy, no skin changes,  hair growth and nail growth normal and equal bilaterally. No swelling, no tenderness.    LOWER EXTREMITIES:  Normal alignment, normal range of motion, no atrophy, no skin changes,  hair growth and nail growth normal and equal bilaterally. No swelling, no tenderness.     CERVICAL SPINE:  Cervical spine: ROM is full in flexion, extension and lateral rotation with increased pain with passive ROM and lateral " "rotation.   Spurling's maneuver - deferred given prior fusion  Myofascial exam: Tenderness to palpation across cervical paraspinous region bilaterally. Prior anterior scar is well-healed.      LUMBAR SPINE:    Lumbar spine: ROM is limited with flexion extension and oblique extension with increased pain to any maneuvar.    ((+)) Supine straight leg raise R > L  ((+)) Facet loading bilateral  Internal and external rotation of the hip causes no increased pain on either side.  Myofascial exam: Tenderness to palpation across lumbar paraspinous muscles.     ((+)) TTP at the SI joint bilaterally  ((+)) MIKEY's test  ((+)) One leg stand    ((+)) Distraction test    MOTOR: Tone and bulk: normal bilateral upper and lower Strength: normal    SENSATION: Light touch and pinprick intact bilaterally  REFLEXES: normal, symmetric, nonbrisk.  Toes down, no clonus. Negative hilliard's sign bilaterally.  GAIT: normal rise, base, steps, and arm swing.      IMAGING: EMG - 8/11/2022 (Completed at [facility])     " IMPRESSION:   This is an abnormal study.  No electrodiagnostic evidence of neuropathy of left upper extremity of nerves tested or left cervical radiculopathy at this time.  Electrodiagnostic evidence of chronic bilateral lower extremity polyneuropathy in areas tested. Sensory, motor, demyelinating, axonal. Hx of hypothyroidism.   No electrodiagnostic evidence of acute bilateral lumbosacral radiculopathy at this time.     ASSESSMENT:   Patient presents with   1. Postlaminectomy syndrome of lumbar region    2. Chronic use of opiate for therapeutic purpose    3. DDD (degenerative disc disease), lumbar    4. DDD (degenerative disc disease), cervical    5. Chronic pain disorder            PLAN:  I have stressed the importance of physical activity and exercise to improve overall health  Reviewed pertinent imaging and records with patient  Refilled Percocet  mg po Q8H, #90, 1 refills.  reviewed and consistent.   UDS collected " today, last had pain medication yesterday.   Offered to have Alecia JONES at next appt, pt declined.   Follow up in 12 weeks or sooner if needed.   All medication management performed by Dr. Carroll.    Laura Heath, NP

## 2023-05-18 LAB
6MAM UR QL: NOT DETECTED
7AMINOCLONAZEPAM UR QL: NOT DETECTED
A-OH ALPRAZ UR QL: NOT DETECTED
ALPHA-OH-MIDAZOLAM: NOT DETECTED
ALPRAZ UR QL: NOT DETECTED
AMPHET UR QL SCN: NOT DETECTED
ANNOTATION COMMENT IMP: NORMAL
ANNOTATION COMMENT IMP: NORMAL
BARBITURATES UR QL: NOT DETECTED
BUPRENORPHINE UR QL: NOT DETECTED
BZE UR QL: NOT DETECTED
CARBOXYTHC UR QL: NOT DETECTED
CARISOPRODOL UR QL: NOT DETECTED
CLONAZEPAM UR QL: NOT DETECTED
CODEINE UR QL: NOT DETECTED
CREAT UR-MCNC: 185.1 MG/DL (ref 20–400)
DIAZEPAM UR QL: NOT DETECTED
ETHYL GLUCURONIDE UR QL: NOT DETECTED
FENTANYL UR QL: NOT DETECTED
GABAPENTIN: PRESENT
HYDROCODONE UR QL: NOT DETECTED
HYDROMORPHONE UR QL: NOT DETECTED
LORAZEPAM UR QL: NOT DETECTED
MDA UR QL: NOT DETECTED
MDEA UR QL: NOT DETECTED
MDMA UR QL: NOT DETECTED
ME-PHENIDATE UR QL: NOT DETECTED
METHADONE UR QL: NOT DETECTED
METHAMPHET UR QL: NOT DETECTED
MIDAZOLAM UR QL SCN: NOT DETECTED
MORPHINE UR QL: NOT DETECTED
NALOXONE: NOT DETECTED
NORBUPRENORPHINE UR QL CFM: NOT DETECTED
NORDIAZEPAM UR QL: NOT DETECTED
NORFENTANYL UR QL: NOT DETECTED
NORHYDROCODONE UR QL CFM: NOT DETECTED
NORMEPERIDINE UR QL CFM: NOT DETECTED
NOROXYCODONE UR QL CFM: PRESENT
NOROXYMORPHONE UR QL SCN: PRESENT
OXAZEPAM UR QL: NOT DETECTED
OXYCODONE UR QL: PRESENT
OXYMORPHONE UR QL: PRESENT
PATHOLOGY STUDY: NORMAL
PCP UR QL: NOT DETECTED
PHENTERMINE UR QL: NOT DETECTED
PREGABALIN: NOT DETECTED
SERVICE CMNT-IMP: NORMAL
TAPENTADOL UR QL SCN: NOT DETECTED
TAPENTADOL UR QL SCN: NOT DETECTED
TEMAZEPAM UR QL: NOT DETECTED
TRAMADOL UR QL: NOT DETECTED
ZOLPIDEM METABOLITE: NOT DETECTED
ZOLPIDEM UR QL: NOT DETECTED

## 2023-06-13 DIAGNOSIS — M79.18 MYOFASCIAL PAIN: ICD-10-CM

## 2023-06-13 DIAGNOSIS — M62.838 MUSCLE SPASMS OF BOTH LOWER EXTREMITIES: ICD-10-CM

## 2023-06-13 DIAGNOSIS — M51.36 DDD (DEGENERATIVE DISC DISEASE), LUMBAR: ICD-10-CM

## 2023-06-14 RX ORDER — BACLOFEN 10 MG/1
TABLET ORAL
Qty: 270 TABLET | Refills: 3 | Status: SHIPPED | OUTPATIENT
Start: 2023-06-14 | End: 2023-07-28 | Stop reason: ALTCHOICE

## 2023-06-27 ENCOUNTER — PATIENT MESSAGE (OUTPATIENT)
Dept: FAMILY MEDICINE | Facility: CLINIC | Age: 68
End: 2023-06-27
Payer: MEDICARE

## 2023-07-07 RX ORDER — ESCITALOPRAM OXALATE 20 MG/1
20 TABLET ORAL NIGHTLY
Qty: 90 TABLET | Refills: 3 | Status: SHIPPED | OUTPATIENT
Start: 2023-07-07

## 2023-07-13 ENCOUNTER — TELEPHONE (OUTPATIENT)
Dept: PAIN MEDICINE | Facility: CLINIC | Age: 68
End: 2023-07-13
Payer: MEDICARE

## 2023-07-13 DIAGNOSIS — G24.3 CERVICAL DYSTONIA: Primary | ICD-10-CM

## 2023-07-13 DIAGNOSIS — G89.4 CHRONIC PAIN DISORDER: Primary | ICD-10-CM

## 2023-07-13 DIAGNOSIS — M96.1 POSTLAMINECTOMY SYNDROME OF LUMBAR REGION: ICD-10-CM

## 2023-07-13 RX ORDER — HYDROCODONE BITARTRATE AND ACETAMINOPHEN 10; 325 MG/1; MG/1
1 TABLET ORAL EVERY 8 HOURS PRN
Qty: 90 TABLET | Refills: 0 | Status: SHIPPED | OUTPATIENT
Start: 2023-07-13 | End: 2023-07-31 | Stop reason: SDUPTHER

## 2023-07-13 NOTE — TELEPHONE ENCOUNTER
----- Message from Melyssa Arciniega sent at 7/13/2023  9:01 AM CDT -----  Contact: pt  Type:  Needs Medical Advice    Who Called: Pt    Pharmacy name and phone #:    [id number] [place] [place] - [address]  [address]  [place] [address]  Phone: [phone] Fax: [phone]        Would the patient rather a call back or a response via [facility]? Call  Best Call Back Number: [phone]  Additional Information: Pt states that she need a callback as soon as possible. States that she need to speak with someone soon to discuss her pain Rx. States that the pharm is completely out and she wants to know if she can get something else for pain. Also states that she needs to be schedule for a botox inj as soon as possible. Please advise thank you

## 2023-07-13 NOTE — TELEPHONE ENCOUNTER
As you know percocets are on back order, pt is allergic to Norco  but can take generic, can you please write a new rx if applicable? Also, I dont  see where she was ever seen for botox by us. Do you know what she may be referring to?

## 2023-07-28 ENCOUNTER — TELEPHONE (OUTPATIENT)
Dept: PAIN MEDICINE | Facility: CLINIC | Age: 68
End: 2023-07-28
Payer: MEDICARE

## 2023-07-28 DIAGNOSIS — G24.3 CERVICAL DYSTONIA: ICD-10-CM

## 2023-07-28 DIAGNOSIS — M79.18 MYOFASCIAL PAIN: Primary | ICD-10-CM

## 2023-07-28 DIAGNOSIS — M51.36 DDD (DEGENERATIVE DISC DISEASE), LUMBAR: ICD-10-CM

## 2023-07-28 RX ORDER — METHOCARBAMOL 750 MG/1
750 TABLET, FILM COATED ORAL EVERY 8 HOURS PRN
Qty: 30 TABLET | Refills: 0 | Status: SHIPPED | OUTPATIENT
Start: 2023-07-28 | End: 2023-07-31 | Stop reason: SDUPTHER

## 2023-07-28 NOTE — TELEPHONE ENCOUNTER
Pt says she was given the robaxin by Dr Aragon but she moved. The Baclofen is not giving her any relief and she is not sleeping d/t the pain. Has an appt with you on Monday. Says if you want her to stop taking the baclofen , she will, but request the robaxin. Please advise. Thank you.

## 2023-07-28 NOTE — TELEPHONE ENCOUNTER
----- Message from Kera Valero sent at 7/28/2023  9:00 AM CDT -----  Contact: self  Type: Needs Medical Advice  Who Called: Patient   Pharmacy name and phone #:   [id number] - [place], [place] - [address]  [address]  [facility] [address]  Phone: [phone] Fax: [phone]  Best Call Back Number: [phone]  Additional Information: Pt wants a prescription methocarbamol 750 mg says she needs it. Pt also states she would like a call to let her know its sent to the pharmacy so she can arrange to have it picked up. Thanks

## 2023-07-31 ENCOUNTER — OFFICE VISIT (OUTPATIENT)
Dept: PAIN MEDICINE | Facility: CLINIC | Age: 68
End: 2023-07-31
Payer: MEDICARE

## 2023-07-31 VITALS
DIASTOLIC BLOOD PRESSURE: 73 MMHG | HEIGHT: 67 IN | BODY MASS INDEX: 28.56 KG/M2 | WEIGHT: 182 LBS | OXYGEN SATURATION: 97 % | HEART RATE: 60 BPM | SYSTOLIC BLOOD PRESSURE: 148 MMHG

## 2023-07-31 DIAGNOSIS — M96.1 POSTLAMINECTOMY SYNDROME OF LUMBAR REGION: ICD-10-CM

## 2023-07-31 DIAGNOSIS — Z79.891 CHRONIC USE OF OPIATE FOR THERAPEUTIC PURPOSE: ICD-10-CM

## 2023-07-31 DIAGNOSIS — G89.4 CHRONIC PAIN DISORDER: ICD-10-CM

## 2023-07-31 DIAGNOSIS — M70.61 GREATER TROCHANTERIC BURSITIS OF BOTH HIPS: Primary | ICD-10-CM

## 2023-07-31 DIAGNOSIS — M70.62 GREATER TROCHANTERIC BURSITIS OF BOTH HIPS: Primary | ICD-10-CM

## 2023-07-31 DIAGNOSIS — G24.3 CERVICAL DYSTONIA: ICD-10-CM

## 2023-07-31 DIAGNOSIS — M79.18 MYOFASCIAL PAIN: ICD-10-CM

## 2023-07-31 DIAGNOSIS — M51.36 DDD (DEGENERATIVE DISC DISEASE), LUMBAR: ICD-10-CM

## 2023-07-31 PROCEDURE — 20610 LARGE JOINT ASPIRATION/INJECTION: BILATERAL GREATER TROCHANTERIC BURSA: ICD-10-PCS | Mod: 50,S$PBB,,

## 2023-07-31 PROCEDURE — 99999 PR PBB SHADOW E&M-EST. PATIENT-LVL IV: ICD-10-PCS | Mod: PBBFAC,,,

## 2023-07-31 PROCEDURE — 99214 OFFICE O/P EST MOD 30 MIN: CPT | Mod: 25,S$PBB,,

## 2023-07-31 PROCEDURE — 99214 OFFICE O/P EST MOD 30 MIN: CPT | Mod: PBBFAC,PO,25

## 2023-07-31 PROCEDURE — 99999 PR PBB SHADOW E&M-EST. PATIENT-LVL IV: CPT | Mod: PBBFAC,,,

## 2023-07-31 PROCEDURE — 20610 DRAIN/INJ JOINT/BURSA W/O US: CPT | Mod: 50,S$PBB,,

## 2023-07-31 PROCEDURE — 80326 AMPHETAMINES 5 OR MORE: CPT

## 2023-07-31 PROCEDURE — 99999PBSHW PR PBB SHADOW TECHNICAL ONLY FILED TO HB: ICD-10-PCS | Mod: PBBFAC,,,

## 2023-07-31 PROCEDURE — 99999PBSHW PR PBB SHADOW TECHNICAL ONLY FILED TO HB: Mod: PBBFAC,,,

## 2023-07-31 PROCEDURE — 20610 DRAIN/INJ JOINT/BURSA W/O US: CPT | Mod: 50,PBBFAC,PO

## 2023-07-31 PROCEDURE — 99214 PR OFFICE/OUTPT VISIT, EST, LEVL IV, 30-39 MIN: ICD-10-PCS | Mod: 25,S$PBB,,

## 2023-07-31 RX ORDER — HYDROCODONE BITARTRATE AND ACETAMINOPHEN 10; 325 MG/1; MG/1
1 TABLET ORAL EVERY 8 HOURS PRN
Qty: 90 TABLET | Refills: 0 | Status: SHIPPED | OUTPATIENT
Start: 2023-10-05 | End: 2023-10-02

## 2023-07-31 RX ORDER — METHOCARBAMOL 750 MG/1
750 TABLET, FILM COATED ORAL EVERY 8 HOURS PRN
Qty: 270 TABLET | Refills: 0 | Status: SHIPPED | OUTPATIENT
Start: 2023-07-31 | End: 2023-11-14

## 2023-07-31 RX ORDER — HYDROCODONE BITARTRATE AND ACETAMINOPHEN 10; 325 MG/1; MG/1
1 TABLET ORAL EVERY 8 HOURS PRN
Qty: 90 TABLET | Refills: 0 | Status: SHIPPED | OUTPATIENT
Start: 2023-08-10 | End: 2023-10-30 | Stop reason: SDUPTHER

## 2023-07-31 RX ORDER — METHYLPREDNISOLONE ACETATE 80 MG/ML
40 INJECTION, SUSPENSION INTRA-ARTICULAR; INTRALESIONAL; INTRAMUSCULAR; SOFT TISSUE
Status: DISCONTINUED | OUTPATIENT
Start: 2023-07-31 | End: 2023-07-31 | Stop reason: HOSPADM

## 2023-07-31 RX ORDER — HYDROCODONE BITARTRATE AND ACETAMINOPHEN 10; 325 MG/1; MG/1
1 TABLET ORAL EVERY 8 HOURS PRN
Qty: 90 TABLET | Refills: 0 | Status: SHIPPED | OUTPATIENT
Start: 2023-09-07 | End: 2023-10-02

## 2023-07-31 RX ADMIN — METHYLPREDNISOLONE ACETATE 40 MG: 80 INJECTION, SUSPENSION INTRA-ARTICULAR; INTRALESIONAL; INTRAMUSCULAR; SOFT TISSUE at 10:07

## 2023-07-31 NOTE — PROCEDURES
Large Joint Aspiration/Injection: bilateral greater trochanteric bursa    Date/Time: 7/31/2023 10:00 AM    Performed by: Laura Heath NP  Authorized by: Laura Heath NP    Consent Done?:  Yes (Written)  Indications:  Pain  Site marked: the procedure site was marked    Timeout: prior to procedure the correct patient, procedure, and site was verified      Local anesthesia used?: Yes    Anesthesia:  Local infiltration  Local anesthetic:  Bupivacaine 0.5% without epinephrine  Anesthetic total (ml):  9      Details:  Needle Size:  25 G  Ultrasonic Guidance for needle placement?: No    Approach:  Lateral  Location:  Hip  Laterality:  Bilateral  Site:  Bilateral greater trochanteric bursa  Medications (Right):  40 mg methylPREDNISolone acetate 80 mg/mL  Medications (Left):  40 mg methylPREDNISolone acetate 80 mg/mL  Patient tolerance:  Patient tolerated the procedure well with no immediate complications     A total of 5 mL containing 4.5 ml of 0.5% bupivicaine without epi and 40 mg of Depomedrol was injected into each GTB.

## 2023-07-31 NOTE — PROGRESS NOTES
FOLLOW UP NOTE:     CHIEF COMPLAINT: neck and low back pain    INTERVAL HISTORY: Alessia Nelson is a 67 y.o. female with PMH significant for hx of cervical spine surgery, hx of lumbar spine surgery X 3, hx of peritonitis requiring bowel resection, and torticollis (per patient report) presents as an established patient for the continued management of back, hip, and neck pain. Today, the patient localizes the worse of her pain to her neck and upper back. The patient presents today for medication refill. The patient reports her neck is the worse pain today. The patient is scheduled for botox injections with Dr. Carroll on 8/14/23. The patient also reports of bilateral hip pain which prohibits her form sleeping at night.    Of note, the patient denies benefit of SCS.   She reports that she is taking Norco  mg PO PRN with benefit and would like to continue Norco and not return to Percocet. The patient also reports benefit of Robaxin. She has d/c baclofen.  The patient also reports of bilateral jaw pain. The patient reports of radiation of her neck pain to her jaw.   The patient denies of any significant changes in her health since her last appointment. The patient also denies of any changes in the character of her pain since her last appointment. The patient reports that her current pain is a 9/10. Patient denies of any urinary/fecal incontinence, saddle anesthesia, or weakness.       PRIOR HPI: Alessia Nelson is a 65 y.o. female with PMH significant for hx of cervical spine surgery, hx of lumbar spine surgery X 3, hx of peritonitis requiring bowel resection, and torticollis (per patient report) presents as an established patient of Dr. Aragon (new to me) for the evaluation of low back and neck pain. The patient reports that she has been in pain for over twenty years. She reports that she had her neck surgery in 2006/2007 in [place]. She reports of benefit in regards to her neck pain. The patient reports of her  initial lumbar spine surgery in 2007/2008 in [place] as well. She reports of improvement after her initial surgery. She reports that she had a second lumbar spine surgery in 2008/2009 as her pain recurred. She reports of benefit with this surgery as well. She reports of a third lumbar spine surgery in 2012. She reports of doing well for a couple years until she fell out of her bed. The patient reports that her pain has been worsening ever since then. Low back pain > neck pain. The patient localizes her pain to the area across her lower back.  The patient reports of radiation down her RLE to her knee. The patient reports of numbness in her feet and hands bilaterally. The patient describes her pain as a sharp and aching type of pain. The patient reports that her pain is worsened with any activity that requires walking and activities such as doing laundry. The patient reports of benefit with pain medication. The patient reports that her current pain is a 6/10. Patient denies of any urinary/fecal incontinence, saddle anesthesia, or weakness.      In regards to her neck pain, the patient localizes her pain to the middle/right side of her neck. The patient reports of radiation down her RUE to her elbow. The patient reports that her current pain in her neck is a 6/10. Of note, the patient reports that she would be willing to undergo surgery if she has to. I have updated the patient's current pain regimen below.         INTERVENTIONAL PAIN HISTORY:  2/23/23- botox for cervical dystonia- Dr. Carroll- moderate relief.   12/8/2021: C7-T1 cervical interlaminar epidural steroid injection - no relief  9/22/2021:  Right sacroiliac joint injection - significant relief  6/18/2021: Insertion of SCS (Nevro)   6/7/2021: SCS Trial - 70 - 80% benefit  3/1/2021: Caudal epidural steroid injection - no benefit   6/10/2019: L5-S3 Right Medial Branch Nerve Coolief thermal Radiofrequency Ablation via Dr. Aragon - no benefit per chart review    "  CURRENT PAIN MEDICATIONS:   Norco  mg PO TID  OTC Tylenol   Gabapentin 400 mg PO BID  Robaxin   No longer taking:   Baclofen 10 mg PO BID/TID  Percocet  mg PO TID  tizanidine causes dry mouth.           ROS:  Review of Systems   Constitutional:  Negative for chills and fever.   HENT:  Negative for sore throat.    Eyes:  Negative for visual disturbance.   Respiratory:  Negative for shortness of breath.    Cardiovascular:  Negative for chest pain.   Gastrointestinal:  Negative for nausea and vomiting.   Genitourinary:  Negative for difficulty urinating.   Musculoskeletal:  Positive for arthralgias, back pain, gait problem and neck pain.   Skin:  Negative for rash.   Allergic/Immunologic: Negative for immunocompromised state.   Neurological:  Positive for numbness. Negative for syncope.   Hematological:  Does not bruise/bleed easily.   Psychiatric/Behavioral:  Negative for suicidal ideas.    All other systems reviewed and are negative.       MEDICAL, SURGICAL, FAMILY, SOCIAL HX: reviewed    MEDICATIONS/ALLERGIES: reviewed    PHYSICAL EXAM:    VITALS: Vitals reviewed.   Vitals:    07/31/23 1004   BP: (!) 148/73   Pulse: 60   SpO2: 97%   Weight: 82.6 kg (182 lb)   Height: 5' 7" (1.702 m)   PainSc:   9                UPPER EXTREMITIES: Normal alignment, normal range of motion, no atrophy, no skin changes,  hair growth and nail growth normal and equal bilaterally. No swelling, no tenderness.    LOWER EXTREMITIES:  Normal alignment, normal range of motion, no atrophy, no skin changes,  hair growth and nail growth normal and equal bilaterally. No swelling, no tenderness.     CERVICAL SPINE:  Cervical spine: ROM is full in flexion, extension and lateral rotation with increased pain with passive ROM and lateral rotation.   Spurling's maneuver - deferred given prior fusion  Myofascial exam: Tenderness to palpation across cervical paraspinous region bilaterally. Prior anterior scar is well-healed.      LUMBAR " "SPINE:    Lumbar spine: ROM is limited with flexion extension and oblique extension with increased pain to any maneuvar.    ((+)) Supine straight leg raise R > L  ((+)) Facet loading bilateral  Internal and external rotation of the hip causes no increased pain on either side.  Myofascial exam: Tenderness to palpation across lumbar paraspinous muscles. TTP to bilateral GTB.      ((+)) TTP at the SI joint bilaterally  ((+)) MIKEY's test  ((+)) One leg stand    ((+)) Distraction test    MOTOR: Tone and bulk: normal bilateral upper and lower Strength: normal    SENSATION: Light touch and pinprick intact bilaterally  REFLEXES: normal, symmetric, nonbrisk.  Toes down, no clonus. Negative hilliard's sign bilaterally.  GAIT: normal rise, base, steps, and arm swing.      IMAGING: EMG - 8/11/2022 (Completed at [facility])     " IMPRESSION:   This is an abnormal study.  No electrodiagnostic evidence of neuropathy of left upper extremity of nerves tested or left cervical radiculopathy at this time.  Electrodiagnostic evidence of chronic bilateral lower extremity polyneuropathy in areas tested. Sensory, motor, demyelinating, axonal. Hx of hypothyroidism.   No electrodiagnostic evidence of acute bilateral lumbosacral radiculopathy at this time.     ASSESSMENT:   Patient presents with   1. Greater trochanteric bursitis of both hips    2. Chronic use of opiate for therapeutic purpose    3. Chronic pain disorder    4. Postlaminectomy syndrome of lumbar region    5. Cervical dystonia    6. DDD (degenerative disc disease), lumbar    7. Myofascial pain              PLAN:  I have stressed the importance of physical activity and exercise to improve overall health  Reviewed pertinent imaging and records with patient  Refilled Norco  mg po Q8H, #90, 2 refills.  reviewed and consistent.   UDS collected today, last had pain medication yesterday.   Refilled Robaxin pt [facility]. RX provided.    Patient " present with bilateral GTB, bilateral GTB injections provided in clinic today.   Reviewed and provided exercises for bursitis with the patient.    Offered to have Mitchellro REP at next appt, pt declined.   Follow up in 12 weeks or sooner if needed.   All medication management performed by Dr. Carroll.    Laura Heath, NP

## 2023-08-02 DIAGNOSIS — N18.32 STAGE 3B CHRONIC KIDNEY DISEASE: ICD-10-CM

## 2023-08-07 LAB
6MAM UR QL: NOT DETECTED
7AMINOCLONAZEPAM UR QL: NOT DETECTED
A-OH ALPRAZ UR QL: NOT DETECTED
ALPHA-OH-MIDAZOLAM: NOT DETECTED
ALPRAZ UR QL: NOT DETECTED
AMPHET UR QL SCN: NOT DETECTED
ANNOTATION COMMENT IMP: NORMAL
ANNOTATION COMMENT IMP: NORMAL
BARBITURATES UR QL: NOT DETECTED
BUPRENORPHINE UR QL: NOT DETECTED
BZE UR QL: NOT DETECTED
CARBOXYTHC UR QL: NOT DETECTED
CARISOPRODOL UR QL: NOT DETECTED
CLONAZEPAM UR QL: NOT DETECTED
CODEINE UR QL: NOT DETECTED
CREAT UR-MCNC: 283.4 MG/DL (ref 20–400)
DIAZEPAM UR QL: NOT DETECTED
ETHYL GLUCURONIDE UR QL: NOT DETECTED
FENTANYL UR QL: NOT DETECTED
GABAPENTIN: PRESENT
HYDROCODONE UR QL: PRESENT
HYDROMORPHONE UR QL: PRESENT
LORAZEPAM UR QL: NOT DETECTED
MDA UR QL: NOT DETECTED
MDEA UR QL: NOT DETECTED
MDMA UR QL: NOT DETECTED
ME-PHENIDATE UR QL: NOT DETECTED
METHADONE UR QL: NOT DETECTED
METHAMPHET UR QL: NOT DETECTED
MIDAZOLAM UR QL SCN: NOT DETECTED
MORPHINE UR QL: NOT DETECTED
NALOXONE: NOT DETECTED
NORBUPRENORPHINE UR QL CFM: NOT DETECTED
NORDIAZEPAM UR QL: NOT DETECTED
NORFENTANYL UR QL: NOT DETECTED
NORHYDROCODONE UR QL CFM: PRESENT
NORMEPERIDINE UR QL CFM: NOT DETECTED
NOROXYCODONE UR QL CFM: NOT DETECTED
NOROXYMORPHONE UR QL SCN: NOT DETECTED
OXAZEPAM UR QL: NOT DETECTED
OXYCODONE UR QL: NOT DETECTED
OXYMORPHONE UR QL: NOT DETECTED
PATHOLOGY STUDY: NORMAL
PCP UR QL: NOT DETECTED
PHENTERMINE UR QL: NOT DETECTED
PREGABALIN: NOT DETECTED
SERVICE CMNT-IMP: NORMAL
TAPENTADOL UR QL SCN: NOT DETECTED
TAPENTADOL UR QL SCN: NOT DETECTED
TEMAZEPAM UR QL: NOT DETECTED
TRAMADOL UR QL: NOT DETECTED
ZOLPIDEM METABOLITE: NOT DETECTED
ZOLPIDEM UR QL: NOT DETECTED

## 2023-08-09 ENCOUNTER — PATIENT OUTREACH (OUTPATIENT)
Dept: ADMINISTRATIVE | Facility: HOSPITAL | Age: 68
End: 2023-08-09
Payer: MEDICARE

## 2023-08-09 ENCOUNTER — PATIENT MESSAGE (OUTPATIENT)
Dept: ADMINISTRATIVE | Facility: HOSPITAL | Age: 68
End: 2023-08-09
Payer: MEDICARE

## 2023-08-09 NOTE — PROGRESS NOTES
Population Health Chart Review & Patient Outreach Details:     Reason for Outreach Encounter:     [x]  Non-Compliant Report   []  Payor Report ([employer], PHN, BCBS, MSSP, MCIP, C, etc.)   []  Pre-Visit Chart Review     Updates Requested / Reviewed:     [x]  Care Everywhere    [x]     []  External Sources ([facility], [facility], DIS, etc.)   []  Care Team Updated    Patient Outreach Method:    [x]  Telephone Outreach Completed   [] Successful   [] Left Voicemail   [x] Unable to Contact (wrong number, no voicemail)  [x]  [facility] Outreach Sent  []  Letter Outreach Mailed  []  Fax Sent for External Records  []  External Records Upload    Health Maintenance Topics Addressed and Outreach Outcomes / Actions Taken:        [x]      Breast Cancer Screening []  Mammo Scheduled      []  External Records Requested     []  Added Reminder to Complete to Upcoming Primary Care Appt Notes     []  Patient Declined     []  Patient Will Call Back to Schedule     []  Patient Will Schedule with External Provider / Order Routed if Applicable             []       Cervical Cancer Screening []  Pap Scheduled      []  External Records Requested     []  Added Reminder to Complete to Upcoming Primary Care Appt Notes     []  Patient Declined     []  Patient Will Call Back to Schedule     []  Patient Will Schedule with External Provider               []          Colorectal Cancer Screening []  Colonoscopy Case Request or Referral Placed     []  External Records Requested     []  Added Reminder to Complete to Upcoming Primary Care Appt Notes     []  Patient Declined     []  Patient Will Call Back to Schedule     []  Patient Will Schedule with External Provider     []  Fit Kit Mailed (add the SmartPhrase under additional notes)     []  Reminded Patient to Complete Home Test             []      Diabetic Eye Exam []  Eye Camera Scheduled or Optometry Referral Placed     []  External Records Requested     []  Added Reminder to Complete  to Upcoming Primary Care Appt Notes     []  Patient Declined     []  Patient Will Call Back to Schedule     []  Patient Will Schedule with External Provider             []      Blood Pressure Control []  Primary Care Follow Up Visit Scheduled     []  Remote Blood Pressure Reading Captured     []  Added Reminder to Complete to Upcoming Primary Care Appt Notes     []  Patient Declined     []  Patient Will Call Back / Patient Will Send Portal Message with Reading     []  Patient Will Call Back to Schedule Provider Visit             []       HbA1c & Other Labs []  Lab Appt Scheduled for Due Labs     []  Primary Care Follow Up Visit Scheduled      []  Reminded Patient to Complete Home Test     []  Added Reminder to Complete to Upcoming Primary Care Appt Notes     []  Patient Declined     []  Patient Will Call Back to Schedule     []  Patient Will Schedule with External Provider / Order Routed if Applicable           []    Schedule Primary Care Appt []  Primary Care Appt Scheduled     []  Patient Declined     []  Patient Will Call Back to Schedule     []  Pt Established with External Provider & Updated Care Team             []      Medication Adherence []  Primary Care Appointment Scheduled     []  Added Reminder to Upcoming Primary Care Appt Notes     []  Patient Reminded to  Prescription     []  Patient Declined, Provider Notified if Needed     []  Sent Provider Message to Review and/or Add Exclusion to Problem List             []      Osteoporosis Screening []  DXA Appointment Scheduled     []  External Records Requested     []  Added Reminder to Complete to Upcoming Primary Care Appt Notes     []  Patient Declined     []  Patient Will Call Back to Schedule     []  Patient Will Schedule with External Provider / Order Routed if Applicable     Additional Care Coordinator Notes:         Further Action Needed If Patient Returns Outreach:

## 2023-08-14 ENCOUNTER — OFFICE VISIT (OUTPATIENT)
Dept: PAIN MEDICINE | Facility: CLINIC | Age: 68
End: 2023-08-14
Payer: MEDICARE

## 2023-08-14 VITALS
HEIGHT: 67 IN | WEIGHT: 177 LBS | HEART RATE: 70 BPM | SYSTOLIC BLOOD PRESSURE: 131 MMHG | BODY MASS INDEX: 27.78 KG/M2 | DIASTOLIC BLOOD PRESSURE: 67 MMHG | RESPIRATION RATE: 16 BRPM

## 2023-08-14 DIAGNOSIS — G24.3 CERVICAL DYSTONIA: Primary | ICD-10-CM

## 2023-08-14 DIAGNOSIS — G89.4 CHRONIC PAIN DISORDER: ICD-10-CM

## 2023-08-14 DIAGNOSIS — M79.18 MYOFASCIAL PAIN: ICD-10-CM

## 2023-08-14 PROCEDURE — 99214 OFFICE O/P EST MOD 30 MIN: CPT | Mod: PBBFAC,PN | Performed by: ANESTHESIOLOGY

## 2023-08-14 PROCEDURE — 99214 PR OFFICE/OUTPT VISIT, EST, LEVL IV, 30-39 MIN: ICD-10-PCS | Mod: 25,S$PBB,, | Performed by: ANESTHESIOLOGY

## 2023-08-14 PROCEDURE — 99999 PR PBB SHADOW E&M-EST. PATIENT-LVL IV: CPT | Mod: PBBFAC,,, | Performed by: ANESTHESIOLOGY

## 2023-08-14 PROCEDURE — 99214 OFFICE O/P EST MOD 30 MIN: CPT | Mod: 25,S$PBB,, | Performed by: ANESTHESIOLOGY

## 2023-08-14 PROCEDURE — 64616 CHEMODENERV MUSC NECK DYSTON: CPT | Mod: 50,PBBFAC,PN | Performed by: ANESTHESIOLOGY

## 2023-08-14 PROCEDURE — 64616 BOTULINUM INJECTION: ICD-10-PCS | Mod: 50,S$PBB,, | Performed by: ANESTHESIOLOGY

## 2023-08-14 PROCEDURE — 99999PBSHW PR PBB SHADOW TECHNICAL ONLY FILED TO HB: ICD-10-PCS | Mod: JZ,PBBFAC,,

## 2023-08-14 PROCEDURE — 99999 PR PBB SHADOW E&M-EST. PATIENT-LVL IV: ICD-10-PCS | Mod: PBBFAC,,, | Performed by: ANESTHESIOLOGY

## 2023-08-14 PROCEDURE — 99999PBSHW PR PBB SHADOW TECHNICAL ONLY FILED TO HB: Mod: JZ,PBBFAC,,

## 2023-08-14 RX ADMIN — ONABOTULINUMTOXINA 200 UNITS: 100 INJECTION, POWDER, LYOPHILIZED, FOR SOLUTION INTRADERMAL; INTRAMUSCULAR at 10:08

## 2023-08-14 NOTE — PROCEDURES
Botulinum Injection  Location: Neck    Date/Time: 8/14/2023 10:00 AM    Performed by: Maurilio Carroll MD  Authorized by: Maurilio Carroll MD      Consent:      Consent obtained:  Written     Consent given by:  Patient     Risks discussed:  Dysphagia, weakness and pain     Alternatives discussed:  No treatment    Universal protocol:      Relevant documents present and verified:  Yes       Site/side verified:  Yes       Immediately prior to procedure a time out was called:  Yes       Patient identity confirmed:  Verbally with patient  Procedure details:      EMG used?:  No     Electrical stimulation used?:  NoNo     Diluted by:  Preservative free saline     Laterality: Bilateral     Toxin (Brand):  OnaBoNT-A (Botox)     Total number of units available:  200     Right splenius cervicis:  50 units divided amongst site(s)     Left splenius cervicis:  25 units divided amongst site(s)     Right sternocleidomastoid:  25 units divided amongst site(s)     Right upper trapezius:  25 units divided amongst site(s)     Left upper trapezius:  25 units divided amongst site(s)     Right horizontal trapezius:  25 units divided amongst site(s)     Left horizontal trapezius:  25 units divided amongst site(s)       Total units injected:  200     Total units wasted:  0    Medications: 200 Units onabotulinumtoxina 100 unit    Post-procedure details:      Patient tolerance of procedure:  Tolerated well, no immediate complications

## 2023-08-14 NOTE — PROGRESS NOTES
"This note was completed with dictation software and grammatical errors may exist.      FOLLOW UP NOTE:     CHIEF COMPLAINT: neck and low back pain    INTERVAL HISTORY: Alessia Nelson is a 67 y.o. female with PMH significant for hx of cervical spine surgery, hx of lumbar spine surgery X 3, hx of peritonitis requiring bowel resection, and torticollis (per patient report) presents as an established patient for the continued management of back, hip, and neck pain.  The patient reports diagnosis of torticollis 10 years ago where she received botox injections in her neck to relieve a similar pain. The patient is requesting to repeat botox injection. The patient reports the pain is bilateral; R>L. The patient also reports of her neck "feeling tight." The patient reports long term benefit of PT but as of late she has not had any relief.  The patient also continues to report of neck pain with radiation into BUE and reports of numbness and tingling into hands and fingers.  Of note, the patient denies benefit of SCS.   She reports that she is taking percocet  mg PO PRN with benefit.  The patient also reports of bilateral jaw pain. The patient reports of radiation of her neck pain to her jaw.   The patient denies of any significant changes in her health since her last appointment. The patient also denies of any changes in the character of her pain since her last appointment. The patient reports that her current pain is a 5/10. Patient denies of any urinary/fecal incontinence, saddle anesthesia, or weakness.     PRIOR HPI: Alessia Nelson is a 65 y.o. female with PMH significant for hx of cervical spine surgery, hx of lumbar spine surgery X 3, hx of peritonitis requiring bowel resection, and torticollis (per patient report) presents as an established patient of Dr. Aragon (new to me) for the evaluation of low back and neck pain. The patient reports that she has been in pain for over twenty years. She reports that she " had her neck surgery in 2006/2007 in [place]. She reports of benefit in regards to her neck pain. The patient reports of her initial lumbar spine surgery in 2007/2008 in [place] as well. She reports of improvement after her initial surgery. She reports that she had a second lumbar spine surgery in 2008/2009 as her pain recurred. She reports of benefit with this surgery as well. She reports of a third lumbar spine surgery in 2012. She reports of doing well for a couple years until she fell out of her bed. The patient reports that her pain has been worsening ever since then. Low back pain > neck pain. The patient localizes her pain to the area across her lower back.  The patient reports of radiation down her RLE to her knee. The patient reports of numbness in her feet and hands bilaterally. The patient describes her pain as a sharp and aching type of pain. The patient reports that her pain is worsened with any activity that requires walking and activities such as doing laundry. The patient reports of benefit with pain medication. The patient reports that her current pain is a 6/10. Patient denies of any urinary/fecal incontinence, saddle anesthesia, or weakness.      In regards to her neck pain, the patient localizes her pain to the middle/right side of her neck. The patient reports of radiation down her RUE to her elbow. The patient reports that her current pain in her neck is a 6/10. Of note, the patient reports that she would be willing to undergo surgery if she has to. I have updated the patient's current pain regimen below.         INTERVENTIONAL PAIN HISTORY:  12/8/2021: C7-T1 cervical interlaminar epidural steroid injection - no relief  9/22/2021:  Right sacroiliac joint injection - significant relief  6/18/2021: Insertion of SCS (Nevro)   6/7/2021: SCS Trial - 70 - 80% benefit  3/1/2021: Caudal epidural steroid injection - no benefit   6/10/2019: L5-S3 Right Medial Branch Nerve Coolief thermal Radiofrequency  "Ablation via Dr. Aragon - no benefit per chart review     CURRENT PAIN MEDICATIONS:   Percocet  mg PO TID  OTC Tylenol   Gabapentin 400 mg PO BID  Baclofen 10 mg PO BID/TID  No longer taking:     tizanidine causes dry mouth.           ROS:  Review of Systems   Constitutional:  Negative for chills and fever.   HENT:  Negative for sore throat.    Eyes:  Negative for visual disturbance.   Respiratory:  Negative for shortness of breath.    Cardiovascular:  Negative for chest pain.   Gastrointestinal:  Negative for nausea and vomiting.   Genitourinary:  Negative for difficulty urinating.   Musculoskeletal:  Positive for arthralgias, back pain, gait problem and neck pain.   Skin:  Negative for rash.   Allergic/Immunologic: Negative for immunocompromised state.   Neurological:  Positive for numbness. Negative for syncope.   Hematological:  Does not bruise/bleed easily.   Psychiatric/Behavioral:  Negative for suicidal ideas.    All other systems reviewed and are negative.       MEDICAL, SURGICAL, FAMILY, SOCIAL HX: reviewed    MEDICATIONS/ALLERGIES: reviewed    PHYSICAL EXAM:    VITALS: Vitals reviewed.   Vitals:    08/14/23 0958   BP: 131/67   Pulse: 70   Resp: 16   Weight: 80.3 kg (177 lb)   Height: 5' 7" (1.702 m)   PainSc:   6   PainLoc: Neck            UPPER EXTREMITIES: Normal alignment, normal range of motion, no atrophy, no skin changes,  hair growth and nail growth normal and equal bilaterally. No swelling, no tenderness.    LOWER EXTREMITIES:  Normal alignment, normal range of motion, no atrophy, no skin changes,  hair growth and nail growth normal and equal bilaterally. No swelling, no tenderness.     CERVICAL SPINE:  Cervical spine: ROM is full in flexion, extension and lateral rotation with increased pain with passive ROM and lateral rotation.   Spurling's maneuver - deferred given prior fusion  Myofascial exam: Tenderness to palpation across cervical paraspinous region bilaterally. Prior anterior scar is " "well-healed.      LUMBAR SPINE:    Lumbar spine: ROM is limited with flexion extension and oblique extension with increased pain to any maneuvar.    ((+)) Supine straight leg raise R > L  ((+)) Facet loading bilateral  Internal and external rotation of the hip causes no increased pain on either side.  Myofascial exam: Tenderness to palpation across lumbar paraspinous muscles.     ((+)) TTP at the SI joint bilaterally  ((+)) MIKEY's test  ((+)) One leg stand    ((+)) Distraction test    MOTOR: Tone and bulk: normal bilateral upper and lower Strength: normal    SENSATION: Light touch and pinprick intact bilaterally  REFLEXES: normal, symmetric, nonbrisk.  Toes down, no clonus. Negative hilliard's sign bilaterally.  GAIT: normal rise, base, steps, and arm swing.      IMAGING: EMG - 8/11/2022 (Completed at [facility])     " IMPRESSION:   This is an abnormal study.  No electrodiagnostic evidence of neuropathy of left upper extremity of nerves tested or left cervical radiculopathy at this time.  Electrodiagnostic evidence of chronic bilateral lower extremity polyneuropathy in areas tested. Sensory, motor, demyelinating, axonal. Hx of hypothyroidism.   No electrodiagnostic evidence of acute bilateral lumbosacral radiculopathy at this time.     ASSESSMENT:   Patient presents with   1. Cervical dystonia    2. Myofascial pain    3. Chronic pain disorder                PLAN:  I have stressed the importance of physical activity and exercise to improve overall health  Reviewed pertinent imaging and records with patient  Perform repeat botox injection treatments for cervical dystonia today  Continue follow up as scheduled.                              "

## 2023-08-27 DIAGNOSIS — I10 ESSENTIAL HYPERTENSION: ICD-10-CM

## 2023-08-28 RX ORDER — HYDROCHLOROTHIAZIDE 12.5 MG/1
TABLET ORAL
Qty: 90 TABLET | Refills: 3 | Status: SHIPPED | OUTPATIENT
Start: 2023-08-28

## 2023-09-07 ENCOUNTER — PATIENT MESSAGE (OUTPATIENT)
Dept: FAMILY MEDICINE | Facility: CLINIC | Age: 68
End: 2023-09-07
Payer: MEDICARE

## 2023-09-15 ENCOUNTER — HOSPITAL ENCOUNTER (OUTPATIENT)
Dept: RADIOLOGY | Facility: HOSPITAL | Age: 68
Discharge: HOME | End: 2023-09-15
Attending: STUDENT IN AN ORGANIZED HEALTH CARE EDUCATION/TRAINING PROGRAM
Payer: MEDICARE

## 2023-09-15 ENCOUNTER — OFFICE VISIT (OUTPATIENT)
Dept: FAMILY MEDICINE | Facility: CLINIC | Age: 68
End: 2023-09-15
Payer: MEDICARE

## 2023-09-15 VITALS
HEART RATE: 58 BPM | SYSTOLIC BLOOD PRESSURE: 146 MMHG | DIASTOLIC BLOOD PRESSURE: 72 MMHG | RESPIRATION RATE: 16 BRPM | HEIGHT: 67 IN | WEIGHT: 182.38 LBS | OXYGEN SATURATION: 98 % | BODY MASS INDEX: 28.63 KG/M2

## 2023-09-15 DIAGNOSIS — K57.30 DIVERTICULOSIS OF LARGE INTESTINE WITHOUT HEMORRHAGE: ICD-10-CM

## 2023-09-15 DIAGNOSIS — F41.1 GENERALIZED ANXIETY DISORDER: ICD-10-CM

## 2023-09-15 DIAGNOSIS — K86.1 CHRONIC PANCREATITIS, UNSPECIFIED PANCREATITIS TYPE: ICD-10-CM

## 2023-09-15 DIAGNOSIS — R10.13 EPIGASTRIC PAIN: ICD-10-CM

## 2023-09-15 DIAGNOSIS — R10.13 EPIGASTRIC PAIN: Primary | ICD-10-CM

## 2023-09-15 PROCEDURE — 74177 CT ABDOMEN PELVIS WITH CONTRAST: ICD-10-PCS | Mod: 26,,, | Performed by: RADIOLOGY

## 2023-09-15 PROCEDURE — 74177 CT ABD & PELVIS W/CONTRAST: CPT | Mod: 26,,, | Performed by: RADIOLOGY

## 2023-09-15 PROCEDURE — 99214 PR OFFICE/OUTPT VISIT, EST, LEVL IV, 30-39 MIN: ICD-10-PCS | Mod: S$PBB,,, | Performed by: STUDENT IN AN ORGANIZED HEALTH CARE EDUCATION/TRAINING PROGRAM

## 2023-09-15 PROCEDURE — 74177 CT ABD & PELVIS W/CONTRAST: CPT | Mod: TC

## 2023-09-15 PROCEDURE — 99214 OFFICE O/P EST MOD 30 MIN: CPT | Mod: S$PBB,,, | Performed by: STUDENT IN AN ORGANIZED HEALTH CARE EDUCATION/TRAINING PROGRAM

## 2023-09-15 PROCEDURE — 99999 PR PBB SHADOW E&M-EST. PATIENT-LVL V: ICD-10-PCS | Mod: PBBFAC,,, | Performed by: STUDENT IN AN ORGANIZED HEALTH CARE EDUCATION/TRAINING PROGRAM

## 2023-09-15 PROCEDURE — 25500020 PHARM REV CODE 255: Performed by: STUDENT IN AN ORGANIZED HEALTH CARE EDUCATION/TRAINING PROGRAM

## 2023-09-15 PROCEDURE — 99999 PR PBB SHADOW E&M-EST. PATIENT-LVL V: CPT | Mod: PBBFAC,,, | Performed by: STUDENT IN AN ORGANIZED HEALTH CARE EDUCATION/TRAINING PROGRAM

## 2023-09-15 PROCEDURE — A9698 NON-RAD CONTRAST MATERIALNOC: HCPCS | Performed by: STUDENT IN AN ORGANIZED HEALTH CARE EDUCATION/TRAINING PROGRAM

## 2023-09-15 PROCEDURE — 99215 OFFICE O/P EST HI 40 MIN: CPT | Mod: PBBFAC,PN,25 | Performed by: STUDENT IN AN ORGANIZED HEALTH CARE EDUCATION/TRAINING PROGRAM

## 2023-09-15 RX ORDER — BUSPIRONE HYDROCHLORIDE 5 MG/1
5 TABLET ORAL 2 TIMES DAILY
Qty: 60 TABLET | Refills: 11 | Status: SHIPPED | OUTPATIENT
Start: 2023-09-15 | End: 2024-09-14

## 2023-09-15 RX ADMIN — IOHEXOL 100 ML: 350 INJECTION, SOLUTION INTRAVENOUS at 05:09

## 2023-09-15 RX ADMIN — IOHEXOL 1000 ML: 9 SOLUTION ORAL at 05:09

## 2023-09-15 NOTE — PROGRESS NOTES
Subjective:       Patient ID: Alessia Nelson is a 67 y.o. female.    Chief Complaint: Follow-up (6m f/u/) and Pain (Pt believes her pancreatitis is flaring up, pt was nauseated yesterday and took Zofran, pain in upper abdomen)    Ms Aggarwal has had months of worsening anxiety and fatigue.  Lately over the last week she has had worsening epigastric pain that she feels is an exacerbation of her chronic pancreatitis.  She has a great deal of chronic abdominal pain and diarrhea but this is different  No fevers.  Malaise overall.  No change in urine or bowel      Review of Systems   Constitutional:  Positive for appetite change and fatigue. Negative for activity change, chills, fever and unexpected weight change.   Respiratory:  Negative for shortness of breath.    Cardiovascular:  Negative for chest pain.   Gastrointestinal:  Positive for abdominal distention and diarrhea. Negative for abdominal pain, anal bleeding, blood in stool, change in bowel habit, nausea, vomiting and change in bowel habit.   Genitourinary:  Negative for dysuria, hematuria and pelvic pain.   Musculoskeletal:  Positive for back pain.   Integumentary:  Negative for rash.   Psychiatric/Behavioral:  Negative for decreased concentration, dysphoric mood, self-injury, sleep disturbance and suicidal ideas. The patient is nervous/anxious.          Objective:      Physical Exam  Constitutional:       General: She is not in acute distress.     Appearance: Normal appearance. She is obese. She is not ill-appearing.   Eyes:      Conjunctiva/sclera: Conjunctivae normal.   Cardiovascular:      Rate and Rhythm: Normal rate and regular rhythm.      Heart sounds: Normal heart sounds. No murmur heard.  Pulmonary:      Effort: Pulmonary effort is normal. No respiratory distress.      Breath sounds: Normal breath sounds. No wheezing, rhonchi or rales.   Abdominal:      Palpations: Abdomen is soft. There is no mass.      Tenderness: There is abdominal tenderness  (generalized epigastric).      Hernia: A hernia is present.   Musculoskeletal:      Right lower leg: No edema.      Left lower leg: No edema.   Skin:     General: Skin is warm and dry.   Neurological:      Mental Status: She is alert. Mental status is at baseline.      Gait: Gait normal.   Psychiatric:         Mood and Affect: Mood normal.         Behavior: Behavior normal.         Thought Content: Thought content normal.         Judgment: Judgment normal.         Assessment:       1. Epigastric pain    2. Generalized anxiety disorder    3. Diverticulosis of large intestine without hemorrhage    4. Chronic pancreatitis, unspecified pancreatitis type        Plan:       Problem List Items Addressed This Visit          Psychiatric    Generalized anxiety disorder     Worsening symptoms.  Would lke to start additional therapy to her lexapro         Relevant Medications    busPIRone (BUSPAR) 5 MG Tab       GI    Diverticulosis of large intestine without hemorrhage     Known history. CT to eval         Abdominal pain - Primary    Relevant Orders    CT Abdomen Pelvis With Contrast    CBC Auto Differential    Comprehensive Metabolic Panel    Lipase    Chronic pancreatitis     Known history. eval with Ct.  Followup with her GI

## 2023-10-02 ENCOUNTER — OFFICE VISIT (OUTPATIENT)
Dept: FAMILY MEDICINE | Facility: CLINIC | Age: 68
End: 2023-10-02
Payer: MEDICARE

## 2023-10-02 VITALS
HEIGHT: 67 IN | OXYGEN SATURATION: 96 % | DIASTOLIC BLOOD PRESSURE: 64 MMHG | SYSTOLIC BLOOD PRESSURE: 126 MMHG | RESPIRATION RATE: 16 BRPM | HEART RATE: 67 BPM | BODY MASS INDEX: 28.49 KG/M2 | WEIGHT: 181.5 LBS

## 2023-10-02 DIAGNOSIS — I10 PRIMARY HYPERTENSION: Chronic | ICD-10-CM

## 2023-10-02 DIAGNOSIS — K21.9 GASTROESOPHAGEAL REFLUX DISEASE, UNSPECIFIED WHETHER ESOPHAGITIS PRESENT: Chronic | ICD-10-CM

## 2023-10-02 DIAGNOSIS — E53.8 B12 DEFICIENCY: Primary | ICD-10-CM

## 2023-10-02 DIAGNOSIS — Z12.31 SCREENING MAMMOGRAM FOR BREAST CANCER: ICD-10-CM

## 2023-10-02 DIAGNOSIS — N18.32 STAGE 3B CHRONIC KIDNEY DISEASE: ICD-10-CM

## 2023-10-02 DIAGNOSIS — I50.22 CHRONIC SYSTOLIC CONGESTIVE HEART FAILURE: ICD-10-CM

## 2023-10-02 DIAGNOSIS — R19.7 DIARRHEA, UNSPECIFIED TYPE: ICD-10-CM

## 2023-10-02 DIAGNOSIS — Z86.39 HISTORY OF HYPERPARATHYROIDISM: ICD-10-CM

## 2023-10-02 DIAGNOSIS — Z87.19 HISTORY OF PANCREATITIS: ICD-10-CM

## 2023-10-02 DIAGNOSIS — F41.1 GENERALIZED ANXIETY DISORDER: ICD-10-CM

## 2023-10-02 DIAGNOSIS — E78.2 MIXED HYPERLIPIDEMIA: ICD-10-CM

## 2023-10-02 DIAGNOSIS — E83.52 HYPERCALCEMIA SYNDROME: ICD-10-CM

## 2023-10-02 PROBLEM — R11.0 NAUSEA: Status: RESOLVED | Noted: 2019-08-15 | Resolved: 2023-10-02

## 2023-10-02 PROBLEM — R10.9 ABDOMINAL PAIN: Status: RESOLVED | Noted: 2019-08-15 | Resolved: 2023-10-02

## 2023-10-02 PROCEDURE — 99214 OFFICE O/P EST MOD 30 MIN: CPT | Mod: S$PBB,,, | Performed by: STUDENT IN AN ORGANIZED HEALTH CARE EDUCATION/TRAINING PROGRAM

## 2023-10-02 PROCEDURE — 99999 PR PBB SHADOW E&M-EST. PATIENT-LVL V: CPT | Mod: PBBFAC,,, | Performed by: STUDENT IN AN ORGANIZED HEALTH CARE EDUCATION/TRAINING PROGRAM

## 2023-10-02 PROCEDURE — 99215 OFFICE O/P EST HI 40 MIN: CPT | Mod: PBBFAC,PN | Performed by: STUDENT IN AN ORGANIZED HEALTH CARE EDUCATION/TRAINING PROGRAM

## 2023-10-02 PROCEDURE — 99214 PR OFFICE/OUTPT VISIT, EST, LEVL IV, 30-39 MIN: ICD-10-PCS | Mod: S$PBB,,, | Performed by: STUDENT IN AN ORGANIZED HEALTH CARE EDUCATION/TRAINING PROGRAM

## 2023-10-02 PROCEDURE — 99999 PR PBB SHADOW E&M-EST. PATIENT-LVL V: ICD-10-PCS | Mod: PBBFAC,,, | Performed by: STUDENT IN AN ORGANIZED HEALTH CARE EDUCATION/TRAINING PROGRAM

## 2023-10-02 RX ORDER — FUROSEMIDE 20 MG/1
20 TABLET ORAL DAILY
Qty: 30 TABLET | Refills: 0 | Status: SHIPPED | OUTPATIENT
Start: 2023-10-02 | End: 2023-11-16 | Stop reason: SDUPTHER

## 2023-10-02 NOTE — PROGRESS NOTES
Subjective:       Patient ID: Alessia Nelson is a 67 y.o. female.    Chief Complaint: Follow-up (Pt states that new medication has given her some rest, Buspar/)    Anxiety- on lexapro and recent addition of buspar due to escalation of symptoms   Seems to be helping and helps her rest.    She had some epigastric discomfort and felt her pancreatitis was flairing  CT scan and lipase without significant findings  1. Unremarkable assessment of the pancreas.  2. Apparent wall thickening along the sigmoid colon.  This could relate to inadequate distension.  Mild or early infectious/inflammatory colitis are also possible in the appropriate clinical setting.      Review of Systems   Constitutional:  Negative for activity change and appetite change.   Respiratory:  Negative for shortness of breath.    Cardiovascular:  Negative for chest pain.   Gastrointestinal:  Negative for abdominal pain and anal bleeding.   Genitourinary:  Negative for dysuria.   Integumentary:  Negative for rash.   Psychiatric/Behavioral:  Negative for dysphoric mood and sleep disturbance. The patient is not nervous/anxious.          Objective:      Physical Exam  Constitutional:       General: She is not in acute distress.     Appearance: Normal appearance. She is not ill-appearing.   Eyes:      Conjunctiva/sclera: Conjunctivae normal.   Cardiovascular:      Rate and Rhythm: Normal rate and regular rhythm.      Heart sounds: Normal heart sounds. No murmur heard.  Pulmonary:      Effort: Pulmonary effort is normal. No respiratory distress.      Breath sounds: Normal breath sounds. No wheezing, rhonchi or rales.   Musculoskeletal:      Right lower leg: Edema present.      Left lower leg: Edema present.   Skin:     General: Skin is warm and dry.   Neurological:      Mental Status: She is alert. Mental status is at baseline.      Gait: Gait normal.   Psychiatric:         Mood and Affect: Mood normal.         Behavior: Behavior normal.         Thought  Content: Thought content normal.         Judgment: Judgment normal.         Assessment:       1. B12 deficiency    2. Generalized anxiety disorder    3. Primary hypertension    4. History of pancreatitis    5. Diarrhea, unspecified type    6. Gastroesophageal reflux disease, unspecified whether esophagitis present    7. Chronic systolic congestive heart failure    8. Stage 3b chronic kidney disease    9. History of hyperparathyroidism    10. Hypercalcemia syndrome    11. Screening mammogram for breast cancer    12. Mixed hyperlipidemia        Plan:       Problem List Items Addressed This Visit          Psychiatric    Generalized anxiety disorder     Stable. Continue current medications and regular followup.              Cardiac/Vascular    HTN (hypertension), onset in her 20s (Chronic)     BP Readings from Last 3 Encounters:   10/02/23 (!) 124/90   09/15/23 (!) 146/72   08/14/23 131/67            CHF, chronic     She has had lasix in the past  She has some increased swelling  restart         Relevant Medications    furosemide (LASIX) 20 MG tablet    Hyperlipidemia    Relevant Orders    Lipid Panel       Renal/    Stage 3b chronic kidney disease     BMP  Lab Results   Component Value Date     09/15/2023    K 4.2 09/15/2023     09/15/2023    CO2 26 09/15/2023    BUN 19 09/15/2023    CREATININE 1.0 09/15/2023    CALCIUM 11.0 (H) 09/15/2023    ANIONGAP 9 09/15/2023    EGFRNORACEVR >60.0 09/15/2023   Repeat and follow with addition of lasix            Endocrine    B12 deficiency - Primary    Relevant Orders    Vitamin B12       GI    GERD (gastroesophageal reflux disease) (Chronic)     Continued symptoms         Diarrhea, chronic     On going issue. unchanged         History of pancreatitis     Seems to be doing about the same  followup with GI          Other Visit Diagnoses       History of hyperparathyroidism        Relevant Orders    Calcium    PTH, intact    Vitamin D    Hypercalcemia syndrome         Relevant Orders    Vitamin D    Screening mammogram for breast cancer        Relevant Orders    Mammo Digital Screening Bilat w/ Farshad

## 2023-10-02 NOTE — ASSESSMENT & PLAN NOTE
BMP  Lab Results   Component Value Date     09/15/2023    K 4.2 09/15/2023     09/15/2023    CO2 26 09/15/2023    BUN 19 09/15/2023    CREATININE 1.0 09/15/2023    CALCIUM 11.0 (H) 09/15/2023    ANIONGAP 9 09/15/2023    EGFRNORACEVR >60.0 09/15/2023     Repeat and follow with addition of lasix

## 2023-10-02 NOTE — ASSESSMENT & PLAN NOTE
BP Readings from Last 3 Encounters:   10/02/23 (!) 124/90   09/15/23 (!) 146/72   08/14/23 131/67

## 2023-10-04 ENCOUNTER — LAB VISIT (OUTPATIENT)
Dept: LAB | Facility: HOSPITAL | Age: 68
End: 2023-10-04
Payer: MEDICARE

## 2023-10-04 DIAGNOSIS — E53.8 B12 DEFICIENCY: ICD-10-CM

## 2023-10-04 DIAGNOSIS — N18.32 STAGE 3B CHRONIC KIDNEY DISEASE: ICD-10-CM

## 2023-10-04 DIAGNOSIS — E83.52 HYPERCALCEMIA SYNDROME: ICD-10-CM

## 2023-10-04 DIAGNOSIS — E78.2 MIXED HYPERLIPIDEMIA: ICD-10-CM

## 2023-10-04 DIAGNOSIS — Z86.39 HISTORY OF HYPERPARATHYROIDISM: ICD-10-CM

## 2023-10-04 LAB
ALBUMIN SERPL BCP-MCNC: 3.9 G/DL (ref 3.5–5.2)
ALP SERPL-CCNC: 96 U/L (ref 55–135)
ALT SERPL W/O P-5'-P-CCNC: 19 U/L (ref 10–44)
ANION GAP SERPL CALC-SCNC: 8 MMOL/L (ref 8–16)
AST SERPL-CCNC: 18 U/L (ref 10–40)
BILIRUB SERPL-MCNC: 0.3 MG/DL (ref 0.1–1)
BUN SERPL-MCNC: 19 MG/DL (ref 8–23)
CALCIUM SERPL-MCNC: 10.7 MG/DL (ref 8.7–10.5)
CALCIUM SERPL-MCNC: 10.7 MG/DL (ref 8.7–10.5)
CHLORIDE SERPL-SCNC: 106 MMOL/L (ref 95–110)
CHOLEST SERPL-MCNC: 204 MG/DL (ref 120–199)
CHOLEST/HDLC SERPL: 4 {RATIO} (ref 2–5)
CO2 SERPL-SCNC: 27 MMOL/L (ref 23–29)
CREAT SERPL-MCNC: 0.9 MG/DL (ref 0.5–1.4)
EST. GFR  (NO RACE VARIABLE): >60 ML/MIN/1.73 M^2
GLUCOSE SERPL-MCNC: 99 MG/DL (ref 70–110)
HDLC SERPL-MCNC: 51 MG/DL (ref 40–75)
HDLC SERPL: 25 % (ref 20–50)
LDLC SERPL CALC-MCNC: 95 MG/DL (ref 63–159)
NONHDLC SERPL-MCNC: 153 MG/DL
POTASSIUM SERPL-SCNC: 4.1 MMOL/L (ref 3.5–5.1)
PROT SERPL-MCNC: 7 G/DL (ref 6–8.4)
SODIUM SERPL-SCNC: 141 MMOL/L (ref 136–145)
TRIGL SERPL-MCNC: 290 MG/DL (ref 30–150)

## 2023-10-04 PROCEDURE — 36415 COLL VENOUS BLD VENIPUNCTURE: CPT | Performed by: STUDENT IN AN ORGANIZED HEALTH CARE EDUCATION/TRAINING PROGRAM

## 2023-10-04 PROCEDURE — 82607 VITAMIN B-12: CPT | Performed by: STUDENT IN AN ORGANIZED HEALTH CARE EDUCATION/TRAINING PROGRAM

## 2023-10-04 PROCEDURE — 82306 VITAMIN D 25 HYDROXY: CPT | Performed by: STUDENT IN AN ORGANIZED HEALTH CARE EDUCATION/TRAINING PROGRAM

## 2023-10-04 PROCEDURE — 83970 ASSAY OF PARATHORMONE: CPT | Performed by: STUDENT IN AN ORGANIZED HEALTH CARE EDUCATION/TRAINING PROGRAM

## 2023-10-04 PROCEDURE — 80061 LIPID PANEL: CPT | Performed by: STUDENT IN AN ORGANIZED HEALTH CARE EDUCATION/TRAINING PROGRAM

## 2023-10-04 PROCEDURE — 80053 COMPREHEN METABOLIC PANEL: CPT | Performed by: STUDENT IN AN ORGANIZED HEALTH CARE EDUCATION/TRAINING PROGRAM

## 2023-10-04 NOTE — TELEPHONE ENCOUNTER
----- Message from Malcom Coughlin sent at 10/4/2023 12:51 PM CDT -----  Regarding: B-12 Injection Refills  Patient came into the [facility] today for blood work. After their appointment, they asked the writer to send a message to their provider about getting refills on their B-12 injections. They are currently out and requested 6 vials.

## 2023-10-05 LAB
25(OH)D3+25(OH)D2 SERPL-MCNC: 36 NG/ML (ref 30–96)
PTH-INTACT SERPL-MCNC: 85.2 PG/ML (ref 9–77)
VIT B12 SERPL-MCNC: 514 PG/ML (ref 210–950)

## 2023-10-05 RX ORDER — CYANOCOBALAMIN 1000 UG/ML
1000 INJECTION, SOLUTION INTRAMUSCULAR; SUBCUTANEOUS
Qty: 10 ML | Refills: 0 | Status: SHIPPED | OUTPATIENT
Start: 2023-10-05

## 2023-10-06 ENCOUNTER — TELEPHONE (OUTPATIENT)
Dept: FAMILY MEDICINE | Facility: CLINIC | Age: 68
End: 2023-10-06
Payer: MEDICARE

## 2023-10-06 DIAGNOSIS — Z86.39 HISTORY OF HYPERPARATHYROIDISM: ICD-10-CM

## 2023-10-06 DIAGNOSIS — E83.52 HYPERCALCEMIA SYNDROME: Primary | ICD-10-CM

## 2023-10-06 DIAGNOSIS — E31.20 MEN (MULTIPLE ENDOCRINE NEOPLASIA): ICD-10-CM

## 2023-10-06 NOTE — TELEPHONE ENCOUNTER
----- Message from Luana Calvert MD sent at 10/6/2023 10:19 AM CDT -----  Please get her set up for endocrinology referral

## 2023-10-06 NOTE — TELEPHONE ENCOUNTER
Reviewed results with pt. Verbalized understanding. Forwarding to our referrals coordinator to assist with scheduling

## 2023-10-06 NOTE — TELEPHONE ENCOUNTER
Pt requested referral be sent to provider closer to her. Referral e-faxed to Dr. Rodriguez and pt informed. AA

## 2023-10-06 NOTE — TELEPHONE ENCOUNTER
Pt declined scheduling internally for endocrinology referral. Soonest appointment was 1/2024 at [facility] and pt does not want to drive to [place] or wait that long. Pt requested referral be sent to provider closer. Pended external referral to Dr. Rodriguez.

## 2023-10-06 NOTE — TELEPHONE ENCOUNTER
----- Message from Reyna Case LPN sent at 10/6/2023  1:10 PM CDT -----  Please assist with scheduling endocrinology referral  ----- Message -----  From: Luana Calvert MD  Sent: 10/6/2023  10:19 AM CDT  To: Enrike CONSTANTINO Staff    Please get her set up for endocrinology referral

## 2023-10-13 NOTE — PROGRESS NOTES
FOLLOW UP NOTE:     CHIEF COMPLAINT: neck and low back pain    INITIAL HISTORY OF PRESENT ILLNESS: Alessia Nelson is a 65 y.o. female with PMH significant for hx of cervical spine surgery, hx of lumbar spine surgery X 3, hx of peritonitis requiring bowel resection, and torticollis (per patient report) presents as an established patient of Dr. Aragon (new to me) for the evaluation of low back and neck pain. The patient reports that she has been in pain for over twenty years. She reports that she had her neck surgery in 2006/2007 in [place]. She reports of benefit in regards to her neck pain. The patient reports of her initial lumbar spine surgery in 2007/2008 in [place] as well. She reports of improvement after her initial surgery. She reports that she had a second lumbar spine surgery in 2008/2009 as her pain recurred. She reports of benefit with this surgery as well. She reports of a third lumbar spine surgery in 2012. She reports of doing well for a couple years until she fell out of her bed. The patient reports that her pain has been worsening ever since then. Low back pain > neck pain. The patient localizes her pain to the area across her lower back.  The patient reports of radiation down her RLE to her knee. The patient reports of numbness in her feet and hands bilaterally. The patient describes her pain as a sharp and aching type of pain. The patient reports that her pain is worsened with any activity that requires walking and activities such as doing laundry. The patient reports of benefit with pain medication. The patient reports that her current pain is a 6/10. Patient denies of any urinary/fecal incontinence, saddle anesthesia, or weakness.      In regards to her neck pain, the patient localizes her pain to the middle/right side of her neck. The patient reports of radiation down her RUE to her elbow. The patient reports that her current pain in her neck is a 6/10. Of note, the patient reports that she would  be willing to undergo surgery if she has to. I have updated the patient's current pain regimen below.     INTERVAL HISTORY OF PRESENT ILLNESS: Alessia Nelson is a 66 y.o. female with PMH significant for hx of cervical spine surgery, hx of lumbar spine surgery X 3, hx of peritonitis requiring bowel resection, and torticollis (per patient report) presents as an established patient for the continued management of back, hip, and neck pain. The patient presents today for medication refill. The patient is s/p left tibia surgery. The patient has 2 surgical incision sites to her left ankle that are still healing, dressing in place that is clean,dry and intact.   Today, the patient reports that her worse pain is her right side lower back with radiation into her buttocks.  She also reports of right knee pain.  The patient reports that at time she can associate the back pain and knee pain but sometimes they are isolated pains. The patient reports activity in general worsens back and knee pain. The patient also continues to report of neck pain with radiation into BUE and reports of numbness and tingling into hands and fingers. The patient does reports of benefit with  PT for neck pain.  Of note, the patient denies benefit of SCS.   She reports that she is taking percocet  mg PO TID/QID with benefit. In the interim, the patient reports she was seen and evaluated by [facility] and is scheduled to return next week for imaging and EMG. The patient reports that tizanidine is causing dry mouth and waking her up at night and would like to change medications. The patient denies of any significant changes in her health since her last appointment. The patient also denies of any changes in the character of her pain since her last appointment. The patient reports that her current pain is a 8/10. Patient denies of any urinary/fecal incontinence, saddle anesthesia, or weakness.     INTERVENTIONAL PAIN  HISTORY:  12/8/2021: C7-T1 cervical interlaminar epidural steroid injection - no relief  9/22/2021:  Right sacroiliac joint injection - significant relief  6/18/2021: Insertion of SCS (Nevro)   6/7/2021: SCS Trial - 70 - 80% benefit  3/1/2021: Caudal epidural steroid injection - no benefit   6/10/2019: L5-S3 Right Medial Branch Nerve Coolief thermal Radiofrequency Ablation via Dr. Aragon - no benefit per chart review     CURRENT PAIN MEDICATIONS:   Percocet  mg PO TID  OTC Tylenol   Gabapentin 400 mg PO BID  Baclofen 10 mg PO BID/TID  No longer taking:     tizanidine causes dry mouth.           ROS:  Review of Systems   Constitutional: Negative for chills and fever.   HENT: Negative for sore throat.    Eyes: Negative for visual disturbance.   Respiratory: Negative for shortness of breath.    Cardiovascular: Negative for chest pain.   Gastrointestinal: Negative for nausea and vomiting.   Genitourinary: Negative for difficulty urinating.   Musculoskeletal: Positive for arthralgias, back pain, gait problem and neck pain.   Skin: Negative for rash.   Allergic/Immunologic: Negative for immunocompromised state.   Neurological: Positive for numbness. Negative for syncope.   Hematological: Does not bruise/bleed easily.   Psychiatric/Behavioral: Negative for suicidal ideas.   All other systems reviewed and are negative.       MEDICAL, SURGICAL, FAMILY, SOCIAL HX: reviewed    MEDICATIONS/ALLERGIES: reviewed    PHYSICAL EXAM:    VITALS: Vitals reviewed.   There were no vitals filed for this visit.    Physical Exam  Vitals and nursing note reviewed.   Constitutional:       Appearance: She is not diaphoretic.   HENT:      Head: Normocephalic and atraumatic.   Eyes:      General:         Right eye: No discharge.         Left eye: No discharge.      Conjunctiva/sclera: Conjunctivae normal.   Cardiovascular:      Rate and Rhythm: Normal rate.   Pulmonary:      Effort: Pulmonary effort is normal. No respiratory distress.       Breath sounds: Normal breath sounds.   Abdominal:      Palpations: Abdomen is soft.   Musculoskeletal:      Right knee: Swelling present. Tenderness present.        Legs:    Skin:     General: Skin is warm and dry.      Findings: No rash.   Neurological:      Mental Status: She is alert and oriented to person, place, and time.   Psychiatric:         Mood and Affect: Mood and affect normal.         Cognition and Memory: Memory normal.         Judgment: Judgment normal.        UPPER EXTREMITIES: Normal alignment, normal range of motion, no atrophy, no skin changes,  hair growth and nail growth normal and equal bilaterally. No swelling, no tenderness.    LOWER EXTREMITIES:  Normal alignment, normal range of motion, no atrophy, no skin changes,  hair growth and nail growth normal and equal bilaterally. No swelling, no tenderness.     CERVICAL SPINE:  Cervical spine: ROM is full in flexion, extension and lateral rotation with increased pain with passive ROM  Spurling's maneuver - deferred given prior fusion  Myofascial exam: Tenderness to palpation across cervical paraspinous region bilaterally. Prior anterior scar is well-healed.      LUMBAR SPINE:    Lumbar spine: ROM is limited with flexion extension and oblique extension with increased pain to any maneuvar.    ((+)) Supine straight leg raise R > L  ((+)) Facet loading bilateral  Internal and external rotation of the hip causes no increased pain on either side.  Myofascial exam: Tenderness to palpation across lumbar paraspinous muscles.     ((+)) TTP at the SI joint bilaterally  ((+)) MIKEY's test  ((+)) One leg stand    ((+)) Distraction test    MOTOR: Tone and bulk: normal bilateral upper and lower Strength: normal    Delt      Bi         Tri        WE      WF                        R          5          5          5          5          5          5            L          5          5          5          5          5          5               IP         ADD     ABD     Quad   TA        Gas      HAM  R          5          5          5          5          5          5          5  L          5          5          5          5          5          5          5     SENSATION: Light touch and pinprick intact bilaterally  REFLEXES: normal, symmetric, nonbrisk.  Toes down, no clonus. Negative hilliard's sign bilaterally.  GAIT: normal rise, base, steps, and arm swing.      IMAGING: no new imaging related to the patient's pain to review    ASSESSMENT: Alessia Nelson is a 66 y.o. female with PMH significant for hx of cervical spine surgery, hx of lumbar spine surgery X 3, hx of peritonitis requiring bowel resection, and torticollis (per patient report) presents as an established patient  for the continued management of back, hip, and neck pain. The patient presents today for medication refill. The patient is currently ambulating with altered gait due to left knee surgery, I believe her current pain is most likely a reoccurence of her SI joint inflammation.  Treatment plan outlined below.   Encounter Diagnoses   Name Primary?    Chronic use of opiate for therapeutic purpose Yes    Muscle spasms of both lower extremities     Myofascial pain        PLAN:    - Defer steroid injections at this time due to surgical site not healing.   - Consider SI joint injection, pts wishes to defer until she receives second opinion from Micha VERGARA  -  Refilled Percocet  mg PO q 8 hr PRN for breakthrough pain; # 90 tablets; 0 refills.  reviewed without discrepancies. Pt requested  date 8/23/22 due to going out of town.   - UDS to be collected today. The patient reports that she last took her pain medications last night.    - Stop  tizanidine   - Prescribed Baclofen 10 mg PO TID PRN for muscle spasms.   - I have stressed the importance of physical activity and a home exercise plan to help with chronic pain and improve health.  - RTC in 4 weeks for follow-up or sooner if needed.   All medication management performed by Dr. Lorenz.   Laura Heath, NP                       Intermediate Repair Preamble Text (Leave Blank If You Do Not Want): Undermining was performed with blunt dissection.

## 2023-10-20 ENCOUNTER — OFFICE VISIT (OUTPATIENT)
Dept: FAMILY MEDICINE | Facility: CLINIC | Age: 68
End: 2023-10-20
Payer: MEDICARE

## 2023-10-20 VITALS
WEIGHT: 188.88 LBS | BODY MASS INDEX: 29.64 KG/M2 | OXYGEN SATURATION: 97 % | RESPIRATION RATE: 16 BRPM | HEIGHT: 67 IN | SYSTOLIC BLOOD PRESSURE: 138 MMHG | HEART RATE: 57 BPM | DIASTOLIC BLOOD PRESSURE: 78 MMHG

## 2023-10-20 DIAGNOSIS — Z12.2 SCREENING FOR LUNG CANCER: ICD-10-CM

## 2023-10-20 DIAGNOSIS — Z87.891 PERSONAL HISTORY OF NICOTINE DEPENDENCE: ICD-10-CM

## 2023-10-20 DIAGNOSIS — E83.52 HYPERCALCEMIA SYNDROME: ICD-10-CM

## 2023-10-20 DIAGNOSIS — I50.22 CHRONIC SYSTOLIC CONGESTIVE HEART FAILURE: ICD-10-CM

## 2023-10-20 DIAGNOSIS — I10 PRIMARY HYPERTENSION: Primary | Chronic | ICD-10-CM

## 2023-10-20 DIAGNOSIS — N18.32 STAGE 3B CHRONIC KIDNEY DISEASE: ICD-10-CM

## 2023-10-20 DIAGNOSIS — E31.20 MEN (MULTIPLE ENDOCRINE NEOPLASIA): ICD-10-CM

## 2023-10-20 PROCEDURE — 99214 OFFICE O/P EST MOD 30 MIN: CPT | Mod: S$PBB,,, | Performed by: STUDENT IN AN ORGANIZED HEALTH CARE EDUCATION/TRAINING PROGRAM

## 2023-10-20 PROCEDURE — 99214 PR OFFICE/OUTPT VISIT, EST, LEVL IV, 30-39 MIN: ICD-10-PCS | Mod: S$PBB,,, | Performed by: STUDENT IN AN ORGANIZED HEALTH CARE EDUCATION/TRAINING PROGRAM

## 2023-10-20 PROCEDURE — 99999 PR PBB SHADOW E&M-EST. PATIENT-LVL V: ICD-10-PCS | Mod: PBBFAC,,, | Performed by: STUDENT IN AN ORGANIZED HEALTH CARE EDUCATION/TRAINING PROGRAM

## 2023-10-20 PROCEDURE — 99999 PR PBB SHADOW E&M-EST. PATIENT-LVL V: CPT | Mod: PBBFAC,,, | Performed by: STUDENT IN AN ORGANIZED HEALTH CARE EDUCATION/TRAINING PROGRAM

## 2023-10-20 PROCEDURE — 99215 OFFICE O/P EST HI 40 MIN: CPT | Mod: PBBFAC,PN | Performed by: STUDENT IN AN ORGANIZED HEALTH CARE EDUCATION/TRAINING PROGRAM

## 2023-10-20 NOTE — ASSESSMENT & PLAN NOTE
Stable. Continue current medications and regular followup.  Hypertension Medications             furosemide (LASIX) 20 MG tablet Take 1 tablet (20 mg total) by mouth once daily.    hydroCHLOROthiazide (HYDRODIURIL) 12.5 MG Tab TAKE 1 TABLET EVERY MONDAY, WEDNESDAY AND FRIDAY    metoprolol succinate (TOPROL-XL) 100 MG 24 hr tablet Take 100 mg by mouth once daily. Pt takes 1/2 tab daily

## 2023-10-20 NOTE — PROGRESS NOTES
Subjective:       Patient ID: Alessia Nelson is a 68 y.o. female.    Chief Complaint: Follow-up (2w f/u/)    Followup for   Hypertension/chf- doing well with lasix addition.  She has not heard from endocrine for her elevated pth in the history of past hypercalcemia and MEN.  She overall has no new complaints today      Review of Systems   Constitutional:  Negative for activity change and appetite change.   Respiratory:  Negative for shortness of breath.    Cardiovascular:  Negative for chest pain.   Gastrointestinal:  Negative for abdominal pain and anal bleeding.   Genitourinary:  Negative for dysuria.   Integumentary:  Negative for rash.   Psychiatric/Behavioral:  Negative for dysphoric mood and sleep disturbance. The patient is not nervous/anxious.          Objective:      Physical Exam  Constitutional:       General: She is not in acute distress.     Appearance: Normal appearance. She is not ill-appearing.   Eyes:      Conjunctiva/sclera: Conjunctivae normal.   Cardiovascular:      Rate and Rhythm: Normal rate and regular rhythm.      Heart sounds: Normal heart sounds. No murmur heard.  Pulmonary:      Effort: Pulmonary effort is normal. No respiratory distress.      Breath sounds: Normal breath sounds.   Musculoskeletal:      Right lower leg: No edema.      Left lower leg: No edema.   Skin:     General: Skin is warm and dry.   Neurological:      Mental Status: She is alert. Mental status is at baseline.      Gait: Gait normal.   Psychiatric:         Mood and Affect: Mood normal.         Behavior: Behavior normal.         Thought Content: Thought content normal.         Judgment: Judgment normal.         Assessment:       1. Primary hypertension    2. Chronic systolic congestive heart failure    3. Stage 3b chronic kidney disease    4. Hypercalcemia syndrome    5. MEN (multiple endocrine neoplasia)    6. Screening for lung cancer    7. Personal history of nicotine dependence        Plan:       Problem List  Items Addressed This Visit          Cardiac/Vascular    HTN (hypertension), onset in her 20s - Primary (Chronic)     Stable. Continue current medications and regular followup.  Hypertension Medications               furosemide (LASIX) 20 MG tablet Take 1 tablet (20 mg total) by mouth once daily.    hydroCHLOROthiazide (HYDRODIURIL) 12.5 MG Tab TAKE 1 TABLET EVERY MONDAY, WEDNESDAY AND FRIDAY    metoprolol succinate (TOPROL-XL) 100 MG 24 hr tablet Take 100 mg by mouth once daily. Pt takes 1/2 tab daily                   CHF, chronic     Better with addition of her lasix back            Renal/    Stage 3b chronic kidney disease     Repeat after addition of lasix again         Relevant Orders    Basic metabolic panel       Endocrine    MEN (multiple endocrine neoplasia) (Chronic)     Other Visit Diagnoses       Hypercalcemia syndrome        getting her back with endocrine    Screening for lung cancer        Relevant Orders    CT Chest Lung Screening Low Dose    Personal history of nicotine dependence        Relevant Orders    CT Chest Lung Screening Low Dose

## 2023-10-25 ENCOUNTER — LAB VISIT (OUTPATIENT)
Dept: LAB | Facility: HOSPITAL | Age: 68
End: 2023-10-25
Attending: STUDENT IN AN ORGANIZED HEALTH CARE EDUCATION/TRAINING PROGRAM
Payer: MEDICARE

## 2023-10-25 ENCOUNTER — HOSPITAL ENCOUNTER (OUTPATIENT)
Dept: RADIOLOGY | Facility: HOSPITAL | Age: 68
Discharge: HOME | End: 2023-10-25
Attending: STUDENT IN AN ORGANIZED HEALTH CARE EDUCATION/TRAINING PROGRAM
Payer: MEDICARE

## 2023-10-25 ENCOUNTER — TELEPHONE (OUTPATIENT)
Dept: FAMILY MEDICINE | Facility: CLINIC | Age: 68
End: 2023-10-25
Payer: MEDICARE

## 2023-10-25 DIAGNOSIS — R91.1 LUNG NODULE: Primary | ICD-10-CM

## 2023-10-25 DIAGNOSIS — Z87.891 PERSONAL HISTORY OF NICOTINE DEPENDENCE: ICD-10-CM

## 2023-10-25 DIAGNOSIS — N18.32 STAGE 3B CHRONIC KIDNEY DISEASE: ICD-10-CM

## 2023-10-25 DIAGNOSIS — Z12.2 SCREENING FOR LUNG CANCER: ICD-10-CM

## 2023-10-25 DIAGNOSIS — E83.52 HYPERCALCEMIA: Primary | ICD-10-CM

## 2023-10-25 LAB
ANION GAP SERPL CALC-SCNC: 11 MMOL/L (ref 8–16)
BUN SERPL-MCNC: 31 MG/DL (ref 8–23)
CALCIUM SERPL-MCNC: 11 MG/DL (ref 8.7–10.5)
CHLORIDE SERPL-SCNC: 107 MMOL/L (ref 95–110)
CO2 SERPL-SCNC: 25 MMOL/L (ref 23–29)
CREAT SERPL-MCNC: 1.2 MG/DL (ref 0.5–1.4)
EST. GFR  (NO RACE VARIABLE): 49.6 ML/MIN/1.73 M^2
GLUCOSE SERPL-MCNC: 103 MG/DL (ref 70–110)
POTASSIUM SERPL-SCNC: 4.4 MMOL/L (ref 3.5–5.1)
SODIUM SERPL-SCNC: 143 MMOL/L (ref 136–145)

## 2023-10-25 PROCEDURE — 71271 CT CHEST LUNG SCREENING LOW DOSE: ICD-10-PCS | Mod: 26,,, | Performed by: RADIOLOGY

## 2023-10-25 PROCEDURE — 71271 CT THORAX LUNG CANCER SCR C-: CPT | Mod: TC

## 2023-10-25 PROCEDURE — 80048 BASIC METABOLIC PNL TOTAL CA: CPT | Performed by: STUDENT IN AN ORGANIZED HEALTH CARE EDUCATION/TRAINING PROGRAM

## 2023-10-25 PROCEDURE — 36415 COLL VENOUS BLD VENIPUNCTURE: CPT | Performed by: STUDENT IN AN ORGANIZED HEALTH CARE EDUCATION/TRAINING PROGRAM

## 2023-10-25 PROCEDURE — 71271 CT THORAX LUNG CANCER SCR C-: CPT | Mod: 26,,, | Performed by: RADIOLOGY

## 2023-10-25 NOTE — TELEPHONE ENCOUNTER
----- Message from Luana Calvert MD sent at 10/25/2023  1:15 PM CDT -----  Please set pt up with repeat CT chest in 6 months.  Thanks  Dr. Calvert

## 2023-10-25 NOTE — TELEPHONE ENCOUNTER
----- Message from Luana Calvert MD sent at 10/25/2023  1:18 PM CDT -----  Can we please ask lab if we can add a PTH and a vitamin D to her drawn lab or if we need her to come back in.

## 2023-10-26 ENCOUNTER — TELEPHONE (OUTPATIENT)
Dept: FAMILY MEDICINE | Facility: CLINIC | Age: 68
End: 2023-10-26
Payer: MEDICARE

## 2023-10-26 NOTE — TELEPHONE ENCOUNTER
Reviewed results with patient. Verbalized understanding.  PTH & Vit D scheduled. Please place future orders for future BUN/CREATNINE labs to schedule pt in a few weeks.

## 2023-10-30 ENCOUNTER — PATIENT MESSAGE (OUTPATIENT)
Dept: PAIN MEDICINE | Facility: CLINIC | Age: 68
End: 2023-10-30

## 2023-10-30 ENCOUNTER — TELEPHONE (OUTPATIENT)
Dept: PAIN MEDICINE | Facility: CLINIC | Age: 68
End: 2023-10-30
Payer: MEDICARE

## 2023-10-30 ENCOUNTER — OFFICE VISIT (OUTPATIENT)
Dept: PAIN MEDICINE | Facility: CLINIC | Age: 68
End: 2023-10-30
Payer: MEDICARE

## 2023-10-30 DIAGNOSIS — M54.12 CERVICAL RADICULOPATHY: Primary | ICD-10-CM

## 2023-10-30 DIAGNOSIS — G89.4 CHRONIC PAIN DISORDER: ICD-10-CM

## 2023-10-30 DIAGNOSIS — M96.1 POSTLAMINECTOMY SYNDROME OF LUMBAR REGION: ICD-10-CM

## 2023-10-30 DIAGNOSIS — G24.3 CERVICAL DYSTONIA: ICD-10-CM

## 2023-10-30 DIAGNOSIS — M50.30 DDD (DEGENERATIVE DISC DISEASE), CERVICAL: ICD-10-CM

## 2023-10-30 PROCEDURE — 99999 PR PBB SHADOW E&M-EST. PATIENT-LVL III: CPT | Mod: PBBFAC,,,

## 2023-10-30 PROCEDURE — 99214 PR OFFICE/OUTPT VISIT, EST, LEVL IV, 30-39 MIN: ICD-10-PCS | Mod: S$PBB,,,

## 2023-10-30 PROCEDURE — 99214 OFFICE O/P EST MOD 30 MIN: CPT | Mod: S$PBB,,,

## 2023-10-30 PROCEDURE — 99213 OFFICE O/P EST LOW 20 MIN: CPT | Mod: PBBFAC,PO

## 2023-10-30 PROCEDURE — 99999 PR PBB SHADOW E&M-EST. PATIENT-LVL III: ICD-10-PCS | Mod: PBBFAC,,,

## 2023-10-30 RX ORDER — HYDROCODONE BITARTRATE AND ACETAMINOPHEN 10; 325 MG/1; MG/1
1 TABLET ORAL EVERY 8 HOURS PRN
Qty: 90 TABLET | Refills: 0 | Status: SHIPPED | OUTPATIENT
Start: 2023-10-30 | End: 2024-01-22 | Stop reason: SDUPTHER

## 2023-10-30 NOTE — PROGRESS NOTES
"  FOLLOW UP NOTE:     CHIEF COMPLAINT: neck and low back pain    INTERVAL HISTORY: Alessia Nelson is a 68 y.o. female with PMH significant for hx of cervical spine surgery, hx of lumbar spine surgery X 3, hx of peritonitis requiring bowel resection, and torticollis (per patient report) presents as an established patient for the continued management of back, hip, and neck pain.  The patient reports diagnosis of torticollis 10 years ago where she received botox injections in her neck to relieve a similar pain. The patient presents is s/p Botox injection on 8/14/23 with Dr. Carroll. The patient reports the pain is bilateral; R>L. The patient also reports of her neck "feeling tight." The patient reports long term benefit of PT but as of late she has not had any relief.  The patient also continues to report of neck pain with radiation into BUE and reports of numbness and tingling into hands and fingers.  Of note, the patient denies benefit of SCS.   She reports that she is taking percocet  mg PO PRN with benefit.  The patient also reports of bilateral jaw pain. The patient reports of radiation of her neck pain to her jaw.   The patient denies of any significant changes in her health since her last appointment. The patient also denies of any changes in the character of her pain since her last appointment. The patient reports that her current pain is a 5/10. Patient denies of any urinary/fecal incontinence, saddle anesthesia, or weakness.     PRIOR HPI: Alessia Nelson is a 65 y.o. female with PMH significant for hx of cervical spine surgery, hx of lumbar spine surgery X 3, hx of peritonitis requiring bowel resection, and torticollis (per patient report) presents as an established patient of Dr. Aragon (new to me) for the evaluation of low back and neck pain. The patient reports that she has been in pain for over twenty years. She reports that she had her neck surgery in 2006/2007 in [place]. She reports of benefit in " regards to her neck pain. The patient reports of her initial lumbar spine surgery in 2007/2008 in [place] as well. She reports of improvement after her initial surgery. She reports that she had a second lumbar spine surgery in 2008/2009 as her pain recurred. She reports of benefit with this surgery as well. She reports of a third lumbar spine surgery in 2012. She reports of doing well for a couple years until she fell out of her bed. The patient reports that her pain has been worsening ever since then. Low back pain > neck pain. The patient localizes her pain to the area across her lower back.  The patient reports of radiation down her RLE to her knee. The patient reports of numbness in her feet and hands bilaterally. The patient describes her pain as a sharp and aching type of pain. The patient reports that her pain is worsened with any activity that requires walking and activities such as doing laundry. The patient reports of benefit with pain medication. The patient reports that her current pain is a 6/10. Patient denies of any urinary/fecal incontinence, saddle anesthesia, or weakness.      In regards to her neck pain, the patient localizes her pain to the middle/right side of her neck. The patient reports of radiation down her RUE to her elbow. The patient reports that her current pain in her neck is a 6/10. Of note, the patient reports that she would be willing to undergo surgery if she has to. I have updated the patient's current pain regimen below.         INTERVENTIONAL PAIN HISTORY:  12/8/2021: C7-T1 cervical interlaminar epidural steroid injection - no relief  9/22/2021:  Right sacroiliac joint injection - significant relief  6/18/2021: Insertion of SCS (Nevro)   6/7/2021: SCS Trial - 70 - 80% benefit  3/1/2021: Caudal epidural steroid injection - no benefit   6/10/2019: L5-S3 Right Medial Branch Nerve Coolief thermal Radiofrequency Ablation via Dr. Aragon - no benefit per chart review     CURRENT PAIN  MEDICATIONS:   Percocet  mg PO TID  OTC Tylenol   Gabapentin 400 mg PO BID  Baclofen 10 mg PO BID/TID  No longer taking:     tizanidine causes dry mouth.           ROS:  Review of Systems   Constitutional:  Negative for chills and fever.   HENT:  Negative for sore throat.    Eyes:  Negative for visual disturbance.   Respiratory:  Negative for shortness of breath.    Cardiovascular:  Negative for chest pain.   Gastrointestinal:  Negative for nausea and vomiting.   Genitourinary:  Negative for difficulty urinating.   Musculoskeletal:  Positive for arthralgias, back pain, gait problem and neck pain.   Skin:  Negative for rash.   Allergic/Immunologic: Negative for immunocompromised state.   Neurological:  Positive for numbness. Negative for syncope.   Hematological:  Does not bruise/bleed easily.   Psychiatric/Behavioral:  Negative for suicidal ideas.    All other systems reviewed and are negative.       MEDICAL, SURGICAL, FAMILY, SOCIAL HX: reviewed    MEDICATIONS/ALLERGIES: reviewed    PHYSICAL EXAM:    VITALS: Vitals reviewed.   There were no vitals filed for this visit.           UPPER EXTREMITIES: Normal alignment, normal range of motion, no atrophy, no skin changes,  hair growth and nail growth normal and equal bilaterally. No swelling, no tenderness.    LOWER EXTREMITIES:  Normal alignment, normal range of motion, no atrophy, no skin changes,  hair growth and nail growth normal and equal bilaterally. No swelling, no tenderness.     CERVICAL SPINE:  Cervical spine: ROM is full in flexion, extension and lateral rotation with increased pain with passive ROM and lateral rotation.   Spurling's maneuver - deferred given prior fusion  Myofascial exam: Tenderness to palpation across cervical paraspinous region bilaterally. Prior anterior scar is well-healed.      LUMBAR SPINE:    Lumbar spine: ROM is limited with flexion extension and oblique extension with increased pain to any maneuvar.    ((+)) Supine straight  "leg raise R > L  ((+)) Facet loading bilateral  Internal and external rotation of the hip causes no increased pain on either side.  Myofascial exam: Tenderness to palpation across lumbar paraspinous muscles.     ((+)) TTP at the SI joint bilaterally  ((+)) MIKEY's test  ((+)) One leg stand    ((+)) Distraction test    MOTOR: Tone and bulk: normal bilateral upper and lower Strength: normal    SENSATION: Light touch and pinprick intact bilaterally  REFLEXES: normal, symmetric, nonbrisk.  Toes down, no clonus. Negative hilliard's sign bilaterally.  GAIT: normal rise, base, steps, and arm swing.      IMAGING: EMG - 8/11/2022 (Completed at [facility])     " IMPRESSION:   This is an abnormal study.  No electrodiagnostic evidence of neuropathy of left upper extremity of nerves tested or left cervical radiculopathy at this time.  Electrodiagnostic evidence of chronic bilateral lower extremity polyneuropathy in areas tested. Sensory, motor, demyelinating, axonal. Hx of hypothyroidism.   No electrodiagnostic evidence of acute bilateral lumbosacral radiculopathy at this time.     ASSESSMENT:   Patient presents with   1. Cervical radiculopathy    2. Chronic pain disorder    3. Postlaminectomy syndrome of lumbar region    4. Cervical dystonia    5. DDD (degenerative disc disease), cervical                  PLAN:  I have stressed the importance of physical activity and exercise to improve overall health  Reviewed pertinent imaging and records with patient  Schedule the patient for Cervical ANTHONY at C7-T1, procedure explained in detail. Written consent obtained.   Refilled Norco  mg PO q 8 hr PRN for breakthrough pain; # 90 tablets; 2 refills.  reviewed without discrepancies.    Prior UDS reviewed and consistent with medications prescribed   RTC for the procedure as outlined above     All medication management performed by Dr. hoyos.    Laura Heath, NP                       "

## 2023-10-30 NOTE — TELEPHONE ENCOUNTER
Types of orders made on 10/30/2023: Procedure Request      Order Date:10/30/2023   Ordering User:LAURA MARTÍNEZ [[id number]]   Encounter Provider:Laura Martínez NP [[id number]]   Authorizing    Provider: Laura Martínez NP [[id number]]   Supervising Provider:LADY HOYOS [[id number]]   Type of Supervision:Collaborating Physician   [place][[id number][place]      Common Order Information   Procedure -> Epidural Injection (specify level) Cmt: C7-T1      Pre-op Diagnosis -> Cervical radiculopathy       Order Specific Information   Order: Procedure Order to Pain Management [Custom: [id number]]  Order #:           [id number]   [id number]: 1 FUTURE     Priority: Routine  Class: Clinic Performed     Future Order Information       Expires on:10/30/2024            Expected by:10/30/2023                    Associated Diagnoses       M54.12 Cervical radiculopathy       Physician -> dr. hoyos           Is patient on anti-coagulants? -> Yes Cmt: plavix          Facility Name: -> [facility]           Follow-up: -> 4 weeks               Priority: Routine     Class: Clinic Performed     Future Order Information       Expires on:10/30/2024            Expected by:10/30/2023                    Associated Diagnoses       M54.12 Cervical radiculopathy       Procedure -> Epidural Injection (specify level) Cmt: C7-T1          Physician -> dr. hoyos           Is patient on anti-coagulants? -> Yes Cmt: plavix          Pre-op Diagnosis -> Cervical radiculopathy           Facility Name: -> [facility]

## 2023-11-01 ENCOUNTER — TELEPHONE (OUTPATIENT)
Dept: FAMILY MEDICINE | Facility: CLINIC | Age: 68
End: 2023-11-01
Payer: MEDICARE

## 2023-11-01 NOTE — TELEPHONE ENCOUNTER
----- Message from Dalila Hill sent at 11/1/2023 10:33 AM CDT -----  Contact: patient  Type: Needs Medical Advice  Who Called:  patient  Best Call Back Number: [phone] (home)   Additional Information: needs GI referral sent to Dr Mcbride, fax# [phone]

## 2023-11-01 NOTE — TELEPHONE ENCOUNTER
----- Message from Darryl Thao sent at 11/1/2023 10:55 AM CDT -----  Regarding: return call  Contact: patient  Type:  Patient Returning Call    Who Called:patient  Who Left Message for Patient:office nurse  Does the patient know what this is regarding?:referral to Endo/ Endo department never receive/ Please resend to Att: Brenda  Would the patient rather a call back or a response via [facility]? referral  Best Call Back Number:[phone]  Additional Information:

## 2023-11-07 NOTE — TELEPHONE ENCOUNTER
Pt is on plavix, may she please hold x 7 days prior to ANTHONY procedure?     Sent to Dr Walsh cardiology , he retired I will inform pt to call cardiology . Left a message for pt to call me back

## 2023-11-09 ENCOUNTER — TELEPHONE (OUTPATIENT)
Dept: CARDIOLOGY | Facility: CLINIC | Age: 68
End: 2023-11-09
Payer: MEDICARE

## 2023-11-09 DIAGNOSIS — Z76.0 MEDICATION REFILL: ICD-10-CM

## 2023-11-09 RX ORDER — POTASSIUM CHLORIDE 20 MEQ/1
20 TABLET, EXTENDED RELEASE ORAL DAILY
Qty: 90 TABLET | Refills: 3 | Status: SHIPPED | OUTPATIENT
Start: 2023-11-09

## 2023-11-09 NOTE — TELEPHONE ENCOUNTER
----- Message from Kera Valero sent at 11/9/2023  8:43 AM CST -----  Contact: SELF  Type: RX Refill Request        Who Called: patient   Refill or New Rx: refill  RX Name and Strength: potassium chloride   How is the patient currently taking it? (ex. 1XDay): as directed   Is this a 30 day or 90 day RX: n/a  [facility] - [address] [address]  Phone: [phone] Fax: [phone]  Local or Mail Order: mail order   Ordering Provider: Enrike Silva Call Back Number: [phone]  Additional Information: Pt needs a refill will be out soon. Thanks

## 2023-11-09 NOTE — TELEPHONE ENCOUNTER
----- Message from Bebeto Quiroz sent at 11/9/2023  8:57 AM CST -----  Type:  Sooner Appointment Request    Caller is requesting a sooner appointment.  Caller declined first available appointment listed below.  Caller will not accept being placed on the waitlist and is requesting a message be sent to doctor.    Name of Caller:  pt  When is the first available appointment?  12//14 was the soonest w/ anyone and she need to be seen sooner  Symptoms:  procedure 11/30 need a medical clearance  Would the patient rather a call back or a response via [facility]? call  Best Call Back Number:  [phone] (home)     Additional Information:  thank you

## 2023-11-09 NOTE — TELEPHONE ENCOUNTER
Care Due:                  Date            Visit Type   Department     Provider  --------------------------------------------------------------------------------                                EP -                              PRIMARY      [facility] FAMILY  Last Visit: 10-      CARE ([facility])   MEDICINE       Luana Calvert                               -                              PRIMARY      [facility] FAMILY  Next Visit: 01-      CARE ([facility])   [facility]       Luana Calvert                                                            Last  Test          Frequency    Reason                     Performed    Due Date  --------------------------------------------------------------------------------    Uric Acid...  12 months..  allopurinoL..............  Not Found    Overdue    [employer] Embedded Care Due Messages. Reference number: [id number].   11/09/2023 3:38:52 PM CST

## 2023-11-09 NOTE — TELEPHONE ENCOUNTER
Refill Routing Note   Medication(s) are not appropriate for processing by [facility] for the following reason(s):      No active prescription written by PCP    [facility] action(s):  Defer Care Due:  None identified          Appointments  past 12m or future 3m with PCP    Date Provider   Last Visit   10/20/2023 Luana Calvert MD   Next Visit   1/26/2024 Luana Calvert MD   ED visits in past 90 days: 0        Note composed:3:38 PM 11/09/2023

## 2023-11-14 DIAGNOSIS — M51.36 DDD (DEGENERATIVE DISC DISEASE), LUMBAR: ICD-10-CM

## 2023-11-14 DIAGNOSIS — M79.18 MYOFASCIAL PAIN: ICD-10-CM

## 2023-11-14 DIAGNOSIS — G24.3 CERVICAL DYSTONIA: ICD-10-CM

## 2023-11-14 DIAGNOSIS — R11.0 NAUSEA: ICD-10-CM

## 2023-11-14 RX ORDER — ONDANSETRON 4 MG/1
TABLET, ORALLY DISINTEGRATING ORAL
Qty: 30 TABLET | Refills: 2 | Status: SHIPPED | OUTPATIENT
Start: 2023-11-14

## 2023-11-14 RX ORDER — METHOCARBAMOL 750 MG/1
TABLET, FILM COATED ORAL
Qty: 270 TABLET | Refills: 3 | Status: SHIPPED | OUTPATIENT
Start: 2023-11-14

## 2023-11-14 NOTE — TELEPHONE ENCOUNTER
No care due was identified.  [facility] Embedded Care Due Messages. Reference number: [id number].   11/14/2023 11:55:46 AM CST

## 2023-11-15 ENCOUNTER — OFFICE VISIT (OUTPATIENT)
Dept: CARDIOLOGY | Facility: CLINIC | Age: 68
End: 2023-11-15
Payer: MEDICARE

## 2023-11-15 VITALS
BODY MASS INDEX: 29.54 KG/M2 | HEART RATE: 87 BPM | WEIGHT: 188.63 LBS | OXYGEN SATURATION: 98 % | DIASTOLIC BLOOD PRESSURE: 84 MMHG | SYSTOLIC BLOOD PRESSURE: 144 MMHG

## 2023-11-15 DIAGNOSIS — E78.2 MIXED HYPERLIPIDEMIA: ICD-10-CM

## 2023-11-15 DIAGNOSIS — I10 PRIMARY HYPERTENSION: Chronic | ICD-10-CM

## 2023-11-15 DIAGNOSIS — R00.2 PALPITATIONS: ICD-10-CM

## 2023-11-15 DIAGNOSIS — R00.1 BRADYCARDIA: ICD-10-CM

## 2023-11-15 DIAGNOSIS — Z95.5 S/P DRUG ELUTING CORONARY STENT PLACEMENT: Chronic | ICD-10-CM

## 2023-11-15 DIAGNOSIS — I50.22 CHRONIC SYSTOLIC CONGESTIVE HEART FAILURE: ICD-10-CM

## 2023-11-15 DIAGNOSIS — Z01.818 PRE-OP EXAM: Primary | ICD-10-CM

## 2023-11-15 DIAGNOSIS — I25.10 CORONARY ARTERY DISEASE INVOLVING NATIVE CORONARY ARTERY OF NATIVE HEART WITHOUT ANGINA PECTORIS: ICD-10-CM

## 2023-11-15 DIAGNOSIS — N18.32 STAGE 3B CHRONIC KIDNEY DISEASE: ICD-10-CM

## 2023-11-15 PROCEDURE — 99999 PR PBB SHADOW E&M-EST. PATIENT-LVL IV: CPT | Mod: PBBFAC,,, | Performed by: NURSE PRACTITIONER

## 2023-11-15 PROCEDURE — 99999 PR PBB SHADOW E&M-EST. PATIENT-LVL IV: ICD-10-PCS | Mod: PBBFAC,,, | Performed by: NURSE PRACTITIONER

## 2023-11-15 PROCEDURE — 99214 PR OFFICE/OUTPT VISIT, EST, LEVL IV, 30-39 MIN: ICD-10-PCS | Mod: S$PBB,25,, | Performed by: NURSE PRACTITIONER

## 2023-11-15 PROCEDURE — 99214 OFFICE O/P EST MOD 30 MIN: CPT | Mod: S$PBB,25,, | Performed by: NURSE PRACTITIONER

## 2023-11-15 PROCEDURE — 93010 EKG 12-LEAD: ICD-10-PCS | Mod: S$PBB,,, | Performed by: INTERNAL MEDICINE

## 2023-11-15 PROCEDURE — 93010 ELECTROCARDIOGRAM REPORT: CPT | Mod: S$PBB,,, | Performed by: INTERNAL MEDICINE

## 2023-11-15 PROCEDURE — 93005 ELECTROCARDIOGRAM TRACING: CPT | Mod: PBBFAC,PN | Performed by: INTERNAL MEDICINE

## 2023-11-15 PROCEDURE — 99214 OFFICE O/P EST MOD 30 MIN: CPT | Mod: PBBFAC,PN | Performed by: NURSE PRACTITIONER

## 2023-11-15 RX ORDER — ROSUVASTATIN CALCIUM 10 MG/1
10 TABLET, COATED ORAL DAILY
Qty: 90 EACH | Refills: 3 | Status: SHIPPED | OUTPATIENT
Start: 2023-11-15 | End: 2024-11-14

## 2023-11-15 NOTE — PROGRESS NOTES
"Subjective:    Patient ID:  Alessia Nelson is a 68 y.o. female   Chief Complaint   Patient presents with    Pre-op Exam     Maurilio Carroll       HPI:    Patient seen today for follow up appointment. She reports noting low HR at times and has had syncope in the past about a year ago. She also feels occasional palpitations. She reports taking metoprolol succinate 50 mg po daily. She does not have her medications with her. She denies any chest pain or SOB.       Review of patient's allergies indicates:   Allergen Reactions    Aspirin Other (See Comments)     "Makes stomach feel like it's on fire"    Phenergan [promethazine] Other (See Comments)     SEIZURES    Promethazine-codeine     Beef containing products      Pt denies    Lortab [hydrocodone-acetaminophen] Itching     Able to take generic Norco       Past Medical History:   Diagnosis Date    Acute pancreatitis     Back pain     CAD (coronary artery disease)     CHF (congestive heart failure)     COPD (chronic obstructive pulmonary disease)     Depression     Diverticulosis     Encounter for blood transfusion     GERD (gastroesophageal reflux disease)     History of blood clots     1986 AFTER BOWEL RESCETION    History of bowel resection     Hyperlipidemia     Hypertension     Peritonitis     Seizures     HAD A SEIZURE FROM PHENERGAN     Stroke     Thyroid disease     TIA (transient ischemic attack)     Wears dentures     upper     Past Surgical History:   Procedure Laterality Date    ADRENAL GLAND SURGERY      APPENDECTOMY      BACK SURGERY      CAUDAL EPIDURAL STEROID INJECTION N/A 3/1/2021    Procedure: Injection-steroid-epidural-caudal;  Surgeon: Socorro Lorenz MD;  Location: [facility] OR;  Service: Pain Management;  Laterality: N/A;    CHOLECYSTECTOMY      COLONOSCOPY      COLONOSCOPY N/A 6/10/2021    Procedure: COLONOSCOPY;  Surgeon: Sheree Metz MD;  Location: [facility];  Service: Endoscopy;  Laterality: N/A;    COLONOSCOPY N/A 3/8/2022    Procedure: " COLONOSCOPY;  Surgeon: Maurilio Rodriguez MD;  [facility]  Service: Endoscopy;  Laterality: N/A;  Pt to perform at-home COVID test morning of procedure-DS  [facility]see letters tab 2/[facility]DS  3/2-Instructions sent via email-DS  3/7-Pt unable to come earlier due to ride-DS    CORONARY STENT PLACEMENT      CYSTOURETHROSCOPY N/A 11/13/2019    Procedure: CYSTOURETHROSCOPY;  Surgeon: Shun Nuñez MD;  Location: [facility] OR;  Service: Urology;  Laterality: N/A;    ENDOSCOPIC ULTRASOUND OF LOWER GASTROINTESTINAL TRACT N/A 3/8/2022    Procedure: ULTRASOUND, LOWER GI TRACT, ENDOSCOPIC;  Surgeon: Maurilio Rodriguez MD;  [facility]  Service: Endoscopy;  Laterality: N/A;  Pt to perform at-home COVID test morning of procedure-DS  2[facility]24-[facility] (see letters tab 2/24/22)-DS  3/2-Instructions sent via email-DS  3/7-Pt unable to come earlier due to ride-DS    ENDOSCOPIC ULTRASOUND OF UPPER GASTROINTESTINAL TRACT N/A 11/25/2019    Procedure: ULTRASOUND, UPPER GI TRACT, ENDOSCOPIC;  Surgeon: Alhaji Bridges MD;  Location: [facility] (2ND FLR);  Service: Endoscopy;  Laterality: N/A;  5 day hold Plavix, Dr Jovanni Serrano - pg  PM prep    ENDOSCOPIC ULTRASOUND OF UPPER GASTROINTESTINAL TRACT N/A 11/2/2020    Procedure: ULTRASOUND, UPPER GI TRACT, ENDOSCOPIC;  Surgeon: Maurilio Rodriguez MD;  Location: [facility] (2ND FLR);  Service: Endoscopy;  Laterality: N/A;  5 day hold Plavix, Dr Roman Walsh - pg  Covid-19 test 10/30/20 at [facility]    EPIDURAL STEROID INJECTION INTO CERVICAL SPINE N/A 12/8/2021    Procedure: Injection-steroid-epidural-cervical;  Surgeon: Socorro Lorenz MD;  Location: [facility] OR;  Service: Pain Management;  Laterality: N/A;    ESOPHAGOGASTRODUODENOSCOPY N/A 6/10/2021    Procedure: EGD (ESOPHAGOGASTRODUODENOSCOPY);  Surgeon: Sheree Metz MD;  Location: [facility];  Service: Endoscopy;  Laterality: N/A;     FRACTURE SURGERY Left 2022    ankle    HERNIA REPAIR      HYSTERECTOMY      INCISIONAL HERNIA REPAIR      INJECTION OF ANESTHETIC AGENT AROUND NERVE Right 2019    Procedure: RIGHT L5-S3 MEDIAL BRANCH BLOCKS;  Surgeon: Sneha Aragon MD;  Location: [facility] OR;  Service: Pain Management;  Laterality: Right;  **DO NOT STOP PLAVIX**    INJECTION OF JOINT Right 2021    Procedure: Injection, Joint; Right SI Joint Injection;  Surgeon: Socorro Lorenz MD;  Location: [facility] OR;  Service: Pain Management;  Laterality: Right;  Oral    INSERTION OF DORSAL COLUMN NERVE STIMULATOR FOR TRIAL N/A 2021    Procedure: INSERTION, NEUROSTIMULATOR, SPINAL CORD, DORSAL COLUMN, FOR TRIAL;  Surgeon: Socorro Lorenz MD;  Location: [facility] OR;  Service: Pain Management;  Laterality: N/A;  nevro rep confirmed start time    instestine      bowel section    KNEE ARTHROPLASTY Right 2019    Procedure: ARTHROPLASTY, KNEE;  Surgeon: Ike Briceño II, MD;  Location: [facility] OR;  Service: Orthopedics;  Laterality: Right;    KNEE SURGERY  1983    OOPHORECTOMY      PARATHYROID GLAND SURGERY      3 surgeries    RADIOFREQUENCY ABLATION Right 6/10/2019    Procedure: Radiofrequency Ablation - RIGHT L3-5 RADIOFREQUENCY ABLATION WITH SomoYARD COOLIEF THERMAL SYSTEM;  Surgeon: Sneha Aragon MD;  Location: [facility] OR;  Service: Pain Management;  Laterality: Right;  **HOLD PLAVIX x 7 DAYS PRIOR**    UPPER GASTROINTESTINAL ENDOSCOPY       Social History     Tobacco Use    Smoking status: Former     Current packs/day: 0.00     Average packs/day: 1 pack/day for 46.0 years (46.0 ttl pk-yrs)     Types: Cigarettes     Start date: 1977     Quit date: 2023     Years since quittin.8    Smokeless tobacco: Never   Substance Use Topics    Alcohol use: Not Currently    Drug use: No     Family History   Problem Relation Age of Onset    Stroke Maternal Grandmother     Cancer Maternal Grandfather     Stroke Mother     Heart disease Father      Breast cancer Sister         Review of Systems:   Per HPI       Objective:        Vitals:    11/15/23 1136   BP: (!) 144/84   Pulse: 87       Lab Results   Component Value Date    WBC 9.07 09/15/2023    HGB 11.5 (L) 09/15/2023    HCT 36.5 (L) 09/15/2023     09/15/2023    CHOL 204 (H) 10/04/2023    TRIG 290 (H) 10/04/2023    HDL 51 10/04/2023    ALT 19 10/04/2023    AST 18 10/04/2023     10/25/2023    K 4.4 10/25/2023     10/25/2023    CREATININE 1.2 10/25/2023    BUN 31 (H) 10/25/2023    CO2 25 10/25/2023    TSH 2.932 12/19/2021        ECHOCARDIOGRAM RESULTS  Results for orders placed in visit on 09/23/20    Echo Color Flow Doppler? Yes; Bubble Contrast? No    Interpretation Summary  · There is left ventricular concentric remodeling.  · The left ventricle is normal in size with normal systolic function. The estimated ejection fraction is 63%.  · Normal left ventricular diastolic function.  · Normal right ventricular systolic function.  · No pulmonary hypertension  · Normal central venous pressure (3 mmHg).  · The estimated PA systolic pressure is 17 mmHg.        CURRENT/PREVIOUS VISIT EKG  Results for orders placed or performed in visit on 11/15/23   IN OFFICE EKG 12-LEAD (to [place])    Collection Time: 11/15/23 11:26 AM    Narrative    Test Reason : Z01.818,    Vent. Rate : 058 BPM     Atrial Rate : 058 BPM     P-R Int : 170 ms          QRS Dur : 102 ms      QT Int : 440 ms       P-R-T Axes : 069 022 077 degrees     QTc Int : 431 ms    Sinus bradycardia  Otherwise normal ECG  When compared with ECG of 18-JUN-2021 05:59,  Premature atrial complexes are no longer Present    Referred By:             Confirmed By:      No valid procedures specified.   Results for orders placed during the hospital encounter of 09/28/20    Nuclear Stress - Cardiology Interpreted    Interpretation Summary    The study shows normal myocardial perfusion.    The perfusion scan is free of evidence from myocardial ischemia or  injury.    Gated perfusion images showed an ejection fraction of % at rest and 84% post stress. Normal ejection fraction is greater than %.    The EKG portion of this study is negative for ischemia.    The patient reported no chest pain during the stress test.    There were no arrhythmias during stress.      Physical Exam:  CONSTITUTIONAL: No fever, no chills  HEENT: Normocephalic, atraumatic,pupils reactive to light                 NECK:  No JVD no carotid bruit  CVS: S1S2+, RRR  LUNGS: Clear  ABDOMEN: Soft, NT, BS+  EXTREMITIES: No cyanosis, edema  : No hammer catheter  NEURO: AAO X 3  PSY: Normal affect      Medication List with Changes/Refills   New Medications    ROSUVASTATIN (CRESTOR) 10 MG TABLET    Take 1 tablet (10 mg total) by mouth once daily.   Current Medications    ACETAMINOPHEN (TYLENOL) 325 MG TABLET    Take 2 tablets (650 mg total) by mouth every 6 (six) hours as needed for Pain (Do not take with any other tylenol containing products).    ALLOPURINOL (ZYLOPRIM) 300 MG TABLET    Take 1 tablet (300 mg total) by mouth once daily.    BUSPIRONE (BUSPAR) 5 MG TAB    Take 1 tablet (5 mg total) by mouth 2 (two) times daily.    CLOPIDOGREL (PLAVIX) 75 MG TABLET    TAKE 1 TABLET EVERY DAY    COLESTIPOL (COLESTID) 1 GRAM TAB    Take 2 tablets (2 g total) by mouth once daily.    CYANOCOBALAMIN 1,000 MCG/ML INJECTION    Inject 1 mL (1,000 mcg total) into the muscle every 14 (fourteen) days.    ESCITALOPRAM OXALATE (LEXAPRO) 20 MG TABLET    Take 1 tablet (20 mg total) by mouth every evening.    FUROSEMIDE (LASIX) 20 MG TABLET    Take 1 tablet (20 mg total) by mouth once daily.    GABAPENTIN (NEURONTIN) 400 MG CAPSULE    TAKE 1 CAPSULE TWICE A DAY    HYDROCHLOROTHIAZIDE (HYDRODIURIL) 12.5 MG TAB    TAKE 1 TABLET EVERY MONDAY, WEDNESDAY AND FRIDAY    HYDROCODONE-ACETAMINOPHEN (NORCO)  MG PER TABLET    Take 1 tablet by mouth every 8 (eight) hours as needed for Pain.    LEVOTHYROXINE (SYNTHROID) 75 MCG TABLET     TAKE 1 TABLET DAILY    MAGNESIUM OXIDE (MAG-OX) 400 MG (241.3 MG MAGNESIUM) TABLET    Take 1 tablet (400 mg total) by mouth once daily.    METHOCARBAMOL (ROBAXIN) 750 MG TAB    TAKE 1 TABLET EVERY 8 HOURS AS NEEDED FOR MUSCLE SPASMS    METOPROLOL SUCCINATE (TOPROL-XL) 100 MG 24 HR TABLET    Take 50 mg by mouth once daily. Pt takes 1/2 tab daily    MUPIROCIN (BACTROBAN) 2 % OINTMENT    Apply topically 3 (three) times daily.    ONABOTULINUMTOXINA (BOTOX) 200 UNIT SOLR    as directed    ONDANSETRON (ZOFRAN-ODT) 4 MG TBDL    PLACE 2 TABLETS ON THE TONGUE EVERY 8 HOURS AS NEEDED FOR NAUSEA    PANTOPRAZOLE (PROTONIX) 40 MG TABLET    TAKE 1 TABLET DAILY    POTASSIUM CHLORIDE SA (KLOR-CON M20) 20 MEQ TABLET    Take 1 tablet (20 mEq total) by mouth once daily.    SODIUM,POTASSIUM,MAG SULFATES (SUPREP BOWEL PREP KIT) 17.5-3.13-1.6 GRAM SOLR    Take as directed before colonoscopy    TRAZODONE (DESYREL) 100 MG TABLET    TAKE 1 TABLET EVERY EVENING    ZENPEP 40,000-126,000- 168,000 UNIT CPDR    Take by mouth.             Assessment:       1. Pre-op exam    2. Bradycardia    3. Coronary artery disease involving native coronary artery of native heart without angina pectoris    4. Chronic systolic congestive heart failure    5. Mixed hyperlipidemia    6. Primary hypertension    7. S/P drug eluting coronary stent placement, 8/2017    8. Stage 3b chronic kidney disease    9. Palpitations         Plan:     Problem List Items Addressed This Visit          Unprioritized    HTN (hypertension), onset in her 20s (Chronic)    Current Assessment & Plan     BP mildly elevated at today's visit. Recommend home monitoring. Recommend low sodium diet.          S/P drug eluting coronary stent placement, 8/2017 (Chronic)    CHF, chronic    Current Assessment & Plan     Euvolemic on exam. Continue furosemide 20 mg po daily, hctz 12.5 mg po MWF.   Continue BP control.          Hyperlipidemia    CAD (coronary artery disease)    Current Assessment &  Plan     Continue conservative management.   Recommend low fat low cholesterol diet and regular exercise. Goal for LDL is less than 70.          Bradycardia    Current Assessment & Plan     Will continue with metoprolol succinate 50 mg po daily.   Obtain event monitor to evaluate for arrhythmias and possible bradyarrhythmias in light of metoprolol on board and hx of Syncope.   EKG today shows sinus rhythm 58 bpm with no significant ST-T wave changes noted.          Relevant Orders    30 Day Outpatient Telemetry    Stage 3b chronic kidney disease     Other Visit Diagnoses       Pre-op exam    -  Primary    Relevant Orders    IN OFFICE EKG 12-LEAD (to [place])    Palpitations        Relevant Orders    30 Day Outpatient Telemetry            Follow up in about 4 weeks (around 12/13/2023).

## 2023-11-15 NOTE — ASSESSMENT & PLAN NOTE
Continue conservative management.   Recommend low fat low cholesterol diet and regular exercise. Goal for LDL is less than 70.

## 2023-11-15 NOTE — ASSESSMENT & PLAN NOTE
Euvolemic on exam. Continue furosemide 20 mg po daily, hctz 12.5 mg po MWF.   Continue BP control.

## 2023-11-15 NOTE — H&P (VIEW-ONLY)
"Subjective:    Patient ID:  Alessia Nelson is a 68 y.o. female   Chief Complaint   Patient presents with    Pre-op Exam     Maurilio Carroll       HPI:    Patient seen today for follow up appointment. She reports noting low HR at times and has had syncope in the past about a year ago. She also feels occasional palpitations. She reports taking metoprolol succinate 50 mg po daily. She does not have her medications with her. She denies any chest pain or SOB.       Review of patient's allergies indicates:   Allergen Reactions    Aspirin Other (See Comments)     "Makes stomach feel like it's on fire"    Phenergan [promethazine] Other (See Comments)     SEIZURES    Promethazine-codeine     Beef containing products      Pt denies    Lortab [hydrocodone-acetaminophen] Itching     Able to take generic Norco       Past Medical History:   Diagnosis Date    Acute pancreatitis     Back pain     CAD (coronary artery disease)     CHF (congestive heart failure)     COPD (chronic obstructive pulmonary disease)     Depression     Diverticulosis     Encounter for blood transfusion     GERD (gastroesophageal reflux disease)     History of blood clots     1986 AFTER BOWEL RESCETION    History of bowel resection     Hyperlipidemia     Hypertension     Peritonitis     Seizures     HAD A SEIZURE FROM PHENERGAN     Stroke     Thyroid disease     TIA (transient ischemic attack)     Wears dentures     upper     Past Surgical History:   Procedure Laterality Date    ADRENAL GLAND SURGERY      APPENDECTOMY      BACK SURGERY      CAUDAL EPIDURAL STEROID INJECTION N/A 3/1/2021    Procedure: Injection-steroid-epidural-caudal;  Surgeon: Socorro Lorenz MD;  Location: [facility] OR;  Service: Pain Management;  Laterality: N/A;    CHOLECYSTECTOMY      COLONOSCOPY      COLONOSCOPY N/A 6/10/2021    Procedure: COLONOSCOPY;  Surgeon: Sheree Metz MD;  Location[facility];  Service: Endoscopy;  Laterality: N/A;    COLONOSCOPY N/A 3/8/2022    Procedure: " COLONOSCOPY;  Surgeon: Maurilio Rodriguez MD;  [facility]  Service: Endoscopy;  Laterality: N/A;  Pt to perform at-home COVID test morning of procedure-DS  [facility]see [facility] tab [facility]DS  3/2-Instructions sent via email-DS  3/7-Pt unable to come earlier due to ride-DS    CORONARY STENT PLACEMENT      CYSTOURETHROSCOPY N/A 11/13/2019    Procedure: CYSTOURETHROSCOPY;  Surgeon: Shun Nuñez MD;  Location: [facility] OR;  Service: Urology;  Laterality: N/A;    ENDOSCOPIC ULTRASOUND OF LOWER GASTROINTESTINAL TRACT N/A 3/8/2022    Procedure: ULTRASOUND, LOWER GI TRACT, ENDOSCOPIC;  Surgeon: Maurilio Rodriguez MD;  [facility]  Service: Endoscopy;  Laterality: N/A;  Pt to perform at-home COVID test morning of procedure-DS  [facility]24/22[facility]DS  3/2-Instructions sent via email-DS  3/7-Pt unable to come earlier due to ride-DS    ENDOSCOPIC ULTRASOUND OF UPPER GASTROINTESTINAL TRACT N/A 11/25/2019    Procedure: ULTRASOUND, UPPER GI TRACT, ENDOSCOPIC;  Surgeon: Alhaji Bridges MD;  Location: [facility] (2ND FLR);  Service: Endoscopy;  Laterality: N/A;  5 day hold Plavix, Dr Jovanni Serrano - pg  PM prep    ENDOSCOPIC ULTRASOUND OF UPPER GASTROINTESTINAL TRACT N/A 11/2/2020    Procedure: ULTRASOUND, UPPER GI TRACT, ENDOSCOPIC;  Surgeon: Maurilio Rodriguez MD;  Location: [facility] (2ND FLR);  Service: Endoscopy;  Laterality: N/A;  5 day hold Plavix, Dr Roman Walsh - pg  Covid-19 test 10/30/20 at [facility]    EPIDURAL STEROID INJECTION INTO CERVICAL SPINE N/A 12/8/2021    Procedure: Injection-steroid-epidural-cervical;  Surgeon: Socorro Lorenz MD;  Location: [facility] OR;  Service: Pain Management;  Laterality: N/A;    ESOPHAGOGASTRODUODENOSCOPY N/A 6/10/2021    Procedure: EGD (ESOPHAGOGASTRODUODENOSCOPY);  Surgeon: Sheree Metz MD;  Location: [facility];  Service: Endoscopy;  Laterality: N/A;     FRACTURE SURGERY Left 2022    ankle    HERNIA REPAIR      HYSTERECTOMY      INCISIONAL HERNIA REPAIR      INJECTION OF ANESTHETIC AGENT AROUND NERVE Right 2019    Procedure: RIGHT L5-S3 MEDIAL BRANCH BLOCKS;  Surgeon: Sneha Aragon MD;  Location: [facility] OR;  Service: Pain Management;  Laterality: Right;  **DO NOT STOP PLAVIX**    INJECTION OF JOINT Right 2021    Procedure: Injection, Joint; Right SI Joint Injection;  Surgeon: Socorro Lorenz MD;  Location: [facility] OR;  Service: Pain Management;  Laterality: Right;  Oral    INSERTION OF DORSAL COLUMN NERVE STIMULATOR FOR TRIAL N/A 2021    Procedure: INSERTION, NEUROSTIMULATOR, SPINAL CORD, DORSAL COLUMN, FOR TRIAL;  Surgeon: Socorro Lorenz MD;  Location: [facility] OR;  Service: Pain Management;  Laterality: N/A;  nevro rep confirmed start time    instestine      bowel section    KNEE ARTHROPLASTY Right 2019    Procedure: ARTHROPLASTY, KNEE;  Surgeon: Ike Briceño II, MD;  Location: [facility] OR;  Service: Orthopedics;  Laterality: Right;    KNEE SURGERY  1983    OOPHORECTOMY      PARATHYROID GLAND SURGERY      3 surgeries    RADIOFREQUENCY ABLATION Right 6/10/2019    Procedure: Radiofrequency Ablation - RIGHT L3-5 RADIOFREQUENCY ABLATION WITH [employer]ER[employer] SYSTEM;  Surgeon: Sneha Aragon MD;  Location: [facility] OR;  Service: Pain Management;  Laterality: Right;  **HOLD PLAVIX x 7 DAYS PRIOR**    UPPER GASTROINTESTINAL ENDOSCOPY       Social History     Tobacco Use    Smoking status: Former     Current packs/day: 0.00     Average packs/day: 1 pack/day for 46.0 years (46.0 ttl pk-yrs)     Types: Cigarettes     Start date: 1977     Quit date: 2023     Years since quittin.8    Smokeless tobacco: Never   Substance Use Topics    Alcohol use: Not Currently    Drug use: No     Family History   Problem Relation Age of Onset    Stroke Maternal Grandmother     Cancer Maternal Grandfather     Stroke Mother     Heart disease Father      Breast cancer Sister         Review of Systems:   Per HPI       Objective:        Vitals:    11/15/23 1136   BP: (!) 144/84   Pulse: 87       Lab Results   Component Value Date    WBC 9.07 09/15/2023    HGB 11.5 (L) 09/15/2023    HCT 36.5 (L) 09/15/2023     09/15/2023    CHOL 204 (H) 10/04/2023    TRIG 290 (H) 10/04/2023    HDL 51 10/04/2023    ALT 19 10/04/2023    AST 18 10/04/2023     10/25/2023    K 4.4 10/25/2023     10/25/2023    CREATININE 1.2 10/25/2023    BUN 31 (H) 10/25/2023    CO2 25 10/25/2023    TSH 2.932 12/19/2021        ECHOCARDIOGRAM RESULTS  Results for orders placed in visit on 09/23/20    Echo Color Flow Doppler? Yes; Bubble Contrast? No    Interpretation Summary  · There is left ventricular concentric remodeling.  · The left ventricle is normal in size with normal systolic function. The estimated ejection fraction is 63%.  · Normal left ventricular diastolic function.  · Normal right ventricular systolic function.  · No pulmonary hypertension  · Normal central venous pressure (3 mmHg).  · The estimated PA systolic pressure is 17 mmHg.        CURRENT/PREVIOUS VISIT EKG  Results for orders placed or performed in visit on 11/15/23   IN OFFICE EKG 12-LEAD (to [place])    Collection Time: 11/15/23 11:26 AM    Narrative    Test Reason : Z01.818,    Vent. Rate : 058 BPM     Atrial Rate : 058 BPM     P-R Int : 170 ms          QRS Dur : 102 ms      QT Int : 440 ms       P-R-T Axes : 069 022 077 degrees     QTc Int : 431 ms    Sinus bradycardia  Otherwise normal ECG  When compared with ECG of 18-JUN-2021 05:59,  Premature atrial complexes are no longer Present    Referred By:             Confirmed By:      No valid procedures specified.   Results for orders placed during the hospital encounter of 09/28/20    Nuclear Stress - Cardiology Interpreted    Interpretation Summary    The study shows normal myocardial perfusion.    The perfusion scan is free of evidence from myocardial ischemia or  injury.    Gated perfusion images showed an ejection fraction of % at rest and 84% post stress. Normal ejection fraction is greater than %.    The EKG portion of this study is negative for ischemia.    The patient reported no chest pain during the stress test.    There were no arrhythmias during stress.      Physical Exam:  CONSTITUTIONAL: No fever, no chills  HEENT: Normocephalic, atraumatic,pupils reactive to light                 NECK:  No JVD no carotid bruit  CVS: S1S2+, RRR  LUNGS: Clear  ABDOMEN: Soft, NT, BS+  EXTREMITIES: No cyanosis, edema  : No hammer catheter  NEURO: AAO X 3  PSY: Normal affect      Medication List with Changes/Refills   New Medications    ROSUVASTATIN (CRESTOR) 10 MG TABLET    Take 1 tablet (10 mg total) by mouth once daily.   Current Medications    ACETAMINOPHEN (TYLENOL) 325 MG TABLET    Take 2 tablets (650 mg total) by mouth every 6 (six) hours as needed for Pain (Do not take with any other tylenol containing products).    ALLOPURINOL (ZYLOPRIM) 300 MG TABLET    Take 1 tablet (300 mg total) by mouth once daily.    BUSPIRONE (BUSPAR) 5 MG TAB    Take 1 tablet (5 mg total) by mouth 2 (two) times daily.    CLOPIDOGREL (PLAVIX) 75 MG TABLET    TAKE 1 TABLET EVERY DAY    COLESTIPOL (COLESTID) 1 GRAM TAB    Take 2 tablets (2 g total) by mouth once daily.    CYANOCOBALAMIN 1,000 MCG/ML INJECTION    Inject 1 mL (1,000 mcg total) into the muscle every 14 (fourteen) days.    ESCITALOPRAM OXALATE (LEXAPRO) 20 MG TABLET    Take 1 tablet (20 mg total) by mouth every evening.    FUROSEMIDE (LASIX) 20 MG TABLET    Take 1 tablet (20 mg total) by mouth once daily.    GABAPENTIN (NEURONTIN) 400 MG CAPSULE    TAKE 1 CAPSULE TWICE A DAY    HYDROCHLOROTHIAZIDE (HYDRODIURIL) 12.5 MG TAB    TAKE 1 TABLET EVERY MONDAY, WEDNESDAY AND FRIDAY    HYDROCODONE-ACETAMINOPHEN (NORCO)  MG PER TABLET    Take 1 tablet by mouth every 8 (eight) hours as needed for Pain.    LEVOTHYROXINE (SYNTHROID) 75 MCG TABLET     TAKE 1 TABLET DAILY    MAGNESIUM OXIDE (MAG-OX) 400 MG (241.3 MG MAGNESIUM) TABLET    Take 1 tablet (400 mg total) by mouth once daily.    METHOCARBAMOL (ROBAXIN) 750 MG TAB    TAKE 1 TABLET EVERY 8 HOURS AS NEEDED FOR MUSCLE SPASMS    METOPROLOL SUCCINATE (TOPROL-XL) 100 MG 24 HR TABLET    Take 50 mg by mouth once daily. Pt takes 1/2 tab daily    MUPIROCIN (BACTROBAN) 2 % OINTMENT    Apply topically 3 (three) times daily.    ONABOTULINUMTOXINA (BOTOX) 200 UNIT SOLR    as directed    ONDANSETRON (ZOFRAN-ODT) 4 MG TBDL    PLACE 2 TABLETS ON THE TONGUE EVERY 8 HOURS AS NEEDED FOR NAUSEA    PANTOPRAZOLE (PROTONIX) 40 MG TABLET    TAKE 1 TABLET DAILY    POTASSIUM CHLORIDE SA (KLOR-CON M20) 20 MEQ TABLET    Take 1 tablet (20 mEq total) by mouth once daily.    SODIUM,POTASSIUM,MAG SULFATES (SUPREP BOWEL PREP KIT) 17.5-3.13-1.6 GRAM SOLR    Take as directed before colonoscopy    TRAZODONE (DESYREL) 100 MG TABLET    TAKE 1 TABLET EVERY EVENING    ZENPEP 40,000-126,000- 168,000 UNIT CPDR    Take by mouth.             Assessment:       1. Pre-op exam    2. Bradycardia    3. Coronary artery disease involving native coronary artery of native heart without angina pectoris    4. Chronic systolic congestive heart failure    5. Mixed hyperlipidemia    6. Primary hypertension    7. S/P drug eluting coronary stent placement, 8/2017    8. Stage 3b chronic kidney disease    9. Palpitations         Plan:     Problem List Items Addressed This Visit          Unprioritized    HTN (hypertension), onset in her 20s (Chronic)    Current Assessment & Plan     BP mildly elevated at today's visit. Recommend home monitoring. Recommend low sodium diet.          S/P drug eluting coronary stent placement, 8/2017 (Chronic)    CHF, chronic    Current Assessment & Plan     Euvolemic on exam. Continue furosemide 20 mg po daily, hctz 12.5 mg po MWF.   Continue BP control.          Hyperlipidemia    CAD (coronary artery disease)    Current Assessment &  Plan     Continue conservative management.   Recommend low fat low cholesterol diet and regular exercise. Goal for LDL is less than 70.          Bradycardia    Current Assessment & Plan     Will continue with metoprolol succinate 50 mg po daily.   Obtain event monitor to evaluate for arrhythmias and possible bradyarrhythmias in light of metoprolol on board and hx of Syncope.   EKG today shows sinus rhythm 58 bpm with no significant ST-T wave changes noted.          Relevant Orders    30 Day Outpatient Telemetry    Stage 3b chronic kidney disease     Other Visit Diagnoses       Pre-op exam    -  Primary    Relevant Orders    IN OFFICE EKG 12-LEAD (to [place])    Palpitations        Relevant Orders    30 Day Outpatient Telemetry            Follow up in about 4 weeks (around 12/13/2023).

## 2023-11-15 NOTE — LETTER
November 15, 2023    Alessia Nelson  [address] [place] [address]             [facility]  [address]  [place] [address]  Phone: [phone]  Fax: [phone] Patient: Alessia Nelson  : 1955  Referring Doctor: heron Lozano NP  Telephone:[phone]  Date of Last Office Visit:11/15/2023  Consulting Provider: Maurilio Carroll. MD  Procedure; Epidural Injection of the C7 -T1       Current Outpatient Medications   Medication Sig    acetaminophen (TYLENOL) 325 MG tablet Take 2 tablets (650 mg total) by mouth every 6 (six) hours as needed for Pain (Do not take with any other tylenol containing products).    allopurinoL (ZYLOPRIM) 300 MG tablet Take 1 tablet (300 mg total) by mouth once daily.    busPIRone (BUSPAR) 5 MG Tab Take 1 tablet (5 mg total) by mouth 2 (two) times daily.    clopidogreL (PLAVIX) 75 mg tablet TAKE 1 TABLET EVERY DAY    colestipoL (COLESTID) 1 gram Tab Take 2 tablets (2 g total) by mouth once daily.    cyanocobalamin 1,000 mcg/mL injection Inject 1 mL (1,000 mcg total) into the muscle every 14 (fourteen) days.    EScitalopram oxalate (LEXAPRO) 20 MG tablet Take 1 tablet (20 mg total) by mouth every evening.    gabapentin (NEURONTIN) 400 MG capsule TAKE 1 CAPSULE TWICE A DAY    hydroCHLOROthiazide (HYDRODIURIL) 12.5 MG Tab TAKE 1 TABLET EVERY MONDAY, WEDNESDAY AND FRIDAY    HYDROcodone-acetaminophen (NORCO)  mg per tablet Take 1 tablet by mouth every 8 (eight) hours as needed for Pain.    levothyroxine (SYNTHROID) 75 MCG tablet TAKE 1 TABLET DAILY    magnesium oxide (MAG-OX) 400 mg (241.3 mg magnesium) tablet Take 1 tablet (400 mg total) by mouth once daily.    methocarbamoL (ROBAXIN) 750 MG Tab TAKE 1 TABLET EVERY 8 HOURS AS NEEDED FOR MUSCLE SPASMS    metoprolol succinate (TOPROL-XL) 100 MG 24 hr tablet Take 50 mg by mouth once daily. Pt takes 1/2 tab daily    mupirocin (BACTROBAN) 2 % ointment Apply  topically 3 (three) times daily.    ondansetron (ZOFRAN-ODT) 4 MG TbDL PLACE 2 TABLETS ON THE TONGUE EVERY 8 HOURS AS NEEDED FOR NAUSEA    pantoprazole (PROTONIX) 40 MG tablet TAKE 1 TABLET DAILY    potassium chloride SA (KLOR-CON M20) 20 MEQ tablet Take 1 tablet (20 mEq total) by mouth once daily.    traZODone (DESYREL) 100 MG tablet TAKE 1 TABLET EVERY EVENING    ZENPEP 40,000-126,000- 168,000 unit CpDR Take by mouth.    furosemide (LASIX) 20 MG tablet Take 1 tablet (20 mg total) by mouth once daily. (Patient not taking: Reported on 11/15/2023)    onabotulinumtoxina (BOTOX) 200 unit SolR as directed    rosuvastatin (CRESTOR) 10 MG tablet Take 1 tablet (10 mg total) by mouth once daily.    sodium,potassium,mag sulfates (SUPREP BOWEL PREP KIT) 17.5-3.13-1.6 gram SolR Take as directed before colonoscopy     No current facility-administered medications for this visit.     Facility-Administered Medications Ordered in Other Visits   Medication    electrolyte-S (ISOLYTE)    fentaNYL 50 mcg/mL injection 25 mcg    HYDROmorphone injection 0.2 mg    lactated ringers infusion    lactated ringers infusion    lactated ringers infusion    LIDOcaine (PF) 10 mg/ml (1%) injection 10 mg    lorazepam injection 0.25 mg    oxyCODONE immediate release tablet 5 mg    prochlorperazine injection Soln 5 mg    sodium chloride 0.9% flush 3 mL       This patient has been assessed for risk factors for clearance of surgery with the following stipulations: Moderate Risk     ___ No contraindications  __x_ Recommendations for antiplatelet/anticoagulant medications: Hold Plavix for 7 days prior to procedure   __x_ Cleared for surgery with the following contraindications/precautions: moderate risks.   ___ Not cleared for surgery due to the following reasons:      If you have any questions regarding the above, please contact my office at [phone]    Sincerely,      KUNAL AndrewC  Department of Cardiology   [facility]

## 2023-11-15 NOTE — ASSESSMENT & PLAN NOTE
Will continue with metoprolol succinate 50 mg po daily.   Obtain event monitor to evaluate for arrhythmias and possible bradyarrhythmias in light of metoprolol on board and hx of Syncope.   EKG today shows sinus rhythm 58 bpm with no significant ST-T wave changes noted.

## 2023-11-16 DIAGNOSIS — I50.22 CHRONIC SYSTOLIC CONGESTIVE HEART FAILURE: ICD-10-CM

## 2023-11-16 RX ORDER — FUROSEMIDE 20 MG/1
20 TABLET ORAL
Qty: 90 TABLET | Refills: 3 | Status: SHIPPED | OUTPATIENT
Start: 2023-11-16

## 2023-11-16 NOTE — TELEPHONE ENCOUNTER
No care due was identified.  [facility] Embedded Care Due Messages. Reference number: [id number].   11/16/2023 8:33:26 AM CST

## 2023-11-16 NOTE — TELEPHONE ENCOUNTER
Refill Routing Note   Medication(s) are not appropriate for processing by [facility] for the following reason(s):        Required vitals abnormal  New or recently adjusted medication    [facility] action(s):  Defer               Appointments  past 12m or future 3m with PCP    Date Provider   Last Visit   10/20/2023 Luana Calvert MD   Next Visit   1/26/2024 Launa Calvert MD   ED visits in past 90 days: 0        Note composed:9:15 AM 11/16/2023

## 2023-11-30 ENCOUNTER — HOSPITAL ENCOUNTER (OUTPATIENT)
Facility: HOSPITAL | Age: 68
Discharge: HOME | End: 2023-11-30
Attending: ANESTHESIOLOGY | Admitting: ANESTHESIOLOGY
Payer: MEDICARE

## 2023-11-30 DIAGNOSIS — M54.12 CERVICAL RADICULITIS: ICD-10-CM

## 2023-11-30 PROCEDURE — 25500020 PHARM REV CODE 255: Performed by: ANESTHESIOLOGY

## 2023-11-30 PROCEDURE — 25000003 PHARM REV CODE 250: Performed by: ANESTHESIOLOGY

## 2023-11-30 PROCEDURE — 62321 PR INJ CERV/THORAC, W/GUIDANCE: ICD-10-PCS | Mod: ,,, | Performed by: ANESTHESIOLOGY

## 2023-11-30 PROCEDURE — 63600175 PHARM REV CODE 636 W HCPCS: Performed by: ANESTHESIOLOGY

## 2023-11-30 PROCEDURE — 62321 NJX INTERLAMINAR CRV/THRC: CPT | Performed by: ANESTHESIOLOGY

## 2023-11-30 PROCEDURE — 62321 NJX INTERLAMINAR CRV/THRC: CPT | Mod: ,,, | Performed by: ANESTHESIOLOGY

## 2023-11-30 RX ORDER — FENTANYL CITRATE 50 UG/ML
INJECTION, SOLUTION INTRAMUSCULAR; INTRAVENOUS
Status: DISCONTINUED | OUTPATIENT
Start: 2023-11-30 | End: 2023-11-30 | Stop reason: HOSPADM

## 2023-11-30 RX ORDER — SODIUM CHLORIDE, SODIUM LACTATE, POTASSIUM CHLORIDE, CALCIUM CHLORIDE 600; 310; 30; 20 MG/100ML; MG/100ML; MG/100ML; MG/100ML
INJECTION, SOLUTION INTRAVENOUS CONTINUOUS
Status: ACTIVE | OUTPATIENT
Start: 2023-11-30

## 2023-11-30 RX ORDER — LIDOCAINE HYDROCHLORIDE 10 MG/ML
1 INJECTION, SOLUTION EPIDURAL; INFILTRATION; INTRACAUDAL; PERINEURAL ONCE
Status: COMPLETED | OUTPATIENT
Start: 2023-11-30 | End: 2023-11-30

## 2023-11-30 RX ORDER — LIDOCAINE HYDROCHLORIDE 10 MG/ML
INJECTION, SOLUTION EPIDURAL; INFILTRATION; INTRACAUDAL; PERINEURAL
Status: DISCONTINUED | OUTPATIENT
Start: 2023-11-30 | End: 2023-11-30 | Stop reason: HOSPADM

## 2023-11-30 RX ORDER — DEXAMETHASONE SODIUM PHOSPHATE 10 MG/ML
INJECTION INTRAMUSCULAR; INTRAVENOUS
Status: DISCONTINUED | OUTPATIENT
Start: 2023-11-30 | End: 2023-11-30 | Stop reason: HOSPADM

## 2023-11-30 RX ORDER — MIDAZOLAM HYDROCHLORIDE 1 MG/ML
INJECTION INTRAMUSCULAR; INTRAVENOUS
Status: DISCONTINUED | OUTPATIENT
Start: 2023-11-30 | End: 2023-11-30 | Stop reason: HOSPADM

## 2023-11-30 RX ADMIN — LIDOCAINE HYDROCHLORIDE 0.2 MG: 10 INJECTION, SOLUTION EPIDURAL; INFILTRATION; INTRACAUDAL; PERINEURAL at 11:11

## 2023-11-30 RX ADMIN — SODIUM CHLORIDE, POTASSIUM CHLORIDE, SODIUM LACTATE AND CALCIUM CHLORIDE 500 ML: 600; 310; 30; 20 INJECTION, SOLUTION INTRAVENOUS at 11:11

## 2023-11-30 NOTE — OP NOTE
PROCEDURE DATE: 11/30/2023    Procedure: C7-T1 cervical interlaminar epidural steroid injection under utilizing fluoroscopy.    Diagnosis: Cervical Degenerative Disc Diease; Cervical Radiculitis  POSTOP DIAGNOSIS: SAME    Physician: Maurilio Carroll MD    Medications injected:  Dexamethasone 10mg followed by a slow injection of 4mL sterile, preservative-free normal saline.    Local anesthetic used: Lidocaine 1%, 2 ml.    Sedation Medications: RN IV sedation    Complications:  None     Estimated blood loss: None    Technique:  A time-out was taken to identify patient and procedure prior to starting the procedure.  With the patient laying in a prone position with the neck in a mid-flexed forward position, the area was prepped and draped in the usual sterile fashion using ChloraPrep and a fenestrated drape.  The area was determined under AP fluoroscopic guidance.  Local anesthetic was given using a 25-gauge 1.5 inch needle by raising a wheal and then infiltrating ventrally.  A 3.5 inch 20-gauge Touhy needle was introduced under fluoroscopic guidance to meet the lamina of C7.  The needle was then hinged under the lamina then advanced using loss of resistance technique.  Once the tip of the needle was in the desired position, the 1ml contrast dye  was injected to determine placement and no uptake.  The steroid was then injected slowly followed by a slow injection of 4 mL of the sterile preservative-free normal saline.  The patient tolerated the procedure well.    The patient was monitored after the procedure and was given post-procedure and discharge instructions to follow at home. The patient was discharged in a stable condition.

## 2023-11-30 NOTE — DISCHARGE SUMMARY
[facility] - Periop Services  Discharge Note  Short Stay    Procedure(s) (LRB):  Injection-steroid-epidural-cervical (N/A)      OUTCOME: Patient tolerated treatment/procedure well without complication and is now ready for discharge.    DISPOSITION: Home or Self Care    FINAL DIAGNOSIS:  <principal problem not specified>    FOLLOWUP: In clinic    DISCHARGE INSTRUCTIONS:    Discharge Procedure Orders   Notify your health care provider if you experience any of the following:  temperature >100.4     Notify your health care provider if you experience any of the following:  severe uncontrolled pain     Notify your health care provider if you experience any of the following:  redness, tenderness, or signs of infection (pain, swelling, redness, odor or green/yellow discharge around incision site)     Activity as tolerated        TIME SPENT ON DISCHARGE: 30 minutes

## 2023-11-30 NOTE — PLAN OF CARE
Discharge instructions given to pt, verbalized understanding.  Tolerating PO fluids.  IV removed.  Neck pain 1/10.  Wheeled out to friend  per RN in no distress.

## 2023-12-01 VITALS
OXYGEN SATURATION: 92 % | HEART RATE: 53 BPM | SYSTOLIC BLOOD PRESSURE: 132 MMHG | HEIGHT: 67 IN | WEIGHT: 188.69 LBS | TEMPERATURE: 98 F | DIASTOLIC BLOOD PRESSURE: 63 MMHG | RESPIRATION RATE: 18 BRPM | BODY MASS INDEX: 29.62 KG/M2

## 2023-12-05 ENCOUNTER — TELEPHONE (OUTPATIENT)
Dept: FAMILY MEDICINE | Facility: CLINIC | Age: 68
End: 2023-12-05
Payer: MEDICARE

## 2023-12-05 NOTE — TELEPHONE ENCOUNTER
----- Message from Manpreet Wallace sent at 12/5/2023  1:37 PM CST -----  Type: Needs Medical Advice  Who Called:  Patient    Best Call Back Number:  [phone]  Additional Information: Patient states that her referrals for Gastro and Endocrinology have not been received by their respective clinics.  Please call to advise.

## 2023-12-18 ENCOUNTER — PATIENT MESSAGE (OUTPATIENT)
Dept: FAMILY MEDICINE | Facility: CLINIC | Age: 68
End: 2023-12-18
Payer: MEDICARE

## 2023-12-28 ENCOUNTER — ANESTHESIA EVENT (OUTPATIENT)
Dept: SURGERY | Facility: HOSPITAL | Age: 68
End: 2023-12-28
Payer: MEDICARE

## 2024-01-02 ENCOUNTER — HOSPITAL ENCOUNTER (OUTPATIENT)
Dept: RADIOLOGY | Facility: HOSPITAL | Age: 69
Discharge: HOME | End: 2024-01-02
Attending: OTOLARYNGOLOGY
Payer: MEDICARE

## 2024-01-02 ENCOUNTER — HOSPITAL ENCOUNTER (OUTPATIENT)
Dept: PREADMISSION TESTING | Facility: HOSPITAL | Age: 69
Discharge: HOME | End: 2024-01-02
Attending: OTOLARYNGOLOGY
Payer: MEDICARE

## 2024-01-02 ENCOUNTER — HOSPITAL ENCOUNTER (OUTPATIENT)
Dept: CARDIOLOGY | Facility: HOSPITAL | Age: 69
Discharge: HOME | End: 2024-01-02
Attending: OTOLARYNGOLOGY
Payer: MEDICARE

## 2024-01-02 DIAGNOSIS — Z96.1 PSEUDOPHAKIA: ICD-10-CM

## 2024-01-02 DIAGNOSIS — Z96.651 STATUS POST RIGHT KNEE REPLACEMENT: ICD-10-CM

## 2024-01-02 DIAGNOSIS — Z01.811 PRE-OP CHEST EXAM: ICD-10-CM

## 2024-01-02 DIAGNOSIS — Z12.11 COLON CANCER SCREENING: ICD-10-CM

## 2024-01-02 DIAGNOSIS — F33.0 MILD EPISODE OF RECURRENT MAJOR DEPRESSIVE DISORDER: ICD-10-CM

## 2024-01-02 DIAGNOSIS — E31.20 MEN (MULTIPLE ENDOCRINE NEOPLASIA): Chronic | ICD-10-CM

## 2024-01-02 DIAGNOSIS — R93.2 ABNORMAL COMPUTERIZED TOMOGRAPHY OF BILIARY TRACT: ICD-10-CM

## 2024-01-02 DIAGNOSIS — M23.203 DEGENERATIVE TEAR OF MEDIAL MENISCUS OF RIGHT KNEE: ICD-10-CM

## 2024-01-02 DIAGNOSIS — Z90.49 HISTORY OF CHOLECYSTECTOMY: ICD-10-CM

## 2024-01-02 DIAGNOSIS — Z86.19 HISTORY OF CLOSTRIDIUM DIFFICILE INFECTION: ICD-10-CM

## 2024-01-02 DIAGNOSIS — N18.32 STAGE 3B CHRONIC KIDNEY DISEASE: Primary | ICD-10-CM

## 2024-01-02 DIAGNOSIS — I95.1 ORTHOSTATIC HYPOTENSION: ICD-10-CM

## 2024-01-02 DIAGNOSIS — S36.222A: ICD-10-CM

## 2024-01-02 DIAGNOSIS — Z87.19 HISTORY OF PANCREATITIS: ICD-10-CM

## 2024-01-02 DIAGNOSIS — R49.0 HOARSENESS: ICD-10-CM

## 2024-01-02 DIAGNOSIS — M54.2 CHRONIC CERVICAL PAIN: ICD-10-CM

## 2024-01-02 DIAGNOSIS — G89.29 CHRONIC BILATERAL LOW BACK PAIN WITHOUT SCIATICA: ICD-10-CM

## 2024-01-02 DIAGNOSIS — E83.42 HYPOMAGNESEMIA: ICD-10-CM

## 2024-01-02 DIAGNOSIS — Z95.5 S/P DRUG ELUTING CORONARY STENT PLACEMENT: Chronic | ICD-10-CM

## 2024-01-02 DIAGNOSIS — Z01.811 PRE-OP CHEST EXAM: Primary | ICD-10-CM

## 2024-01-02 DIAGNOSIS — Z78.9 EXCESSIVE CAFFEINE INTAKE: ICD-10-CM

## 2024-01-02 DIAGNOSIS — K86.1 CHRONIC PANCREATITIS: ICD-10-CM

## 2024-01-02 DIAGNOSIS — M17.11 PRIMARY OSTEOARTHRITIS OF RIGHT KNEE: ICD-10-CM

## 2024-01-02 DIAGNOSIS — I10 HTN (HYPERTENSION): Chronic | ICD-10-CM

## 2024-01-02 DIAGNOSIS — Z86.73 HISTORY OF TIA (TRANSIENT ISCHEMIC ATTACK): ICD-10-CM

## 2024-01-02 DIAGNOSIS — R00.1 BRADYCARDIA: ICD-10-CM

## 2024-01-02 DIAGNOSIS — K86.89 PANCREATIC INSUFFICIENCY: Chronic | ICD-10-CM

## 2024-01-02 DIAGNOSIS — F41.1 GENERALIZED ANXIETY DISORDER: ICD-10-CM

## 2024-01-02 DIAGNOSIS — F17.200 NICOTINE DEPENDENCE: ICD-10-CM

## 2024-01-02 DIAGNOSIS — M17.11 UNILATERAL PRIMARY OSTEOARTHRITIS, RIGHT KNEE: ICD-10-CM

## 2024-01-02 DIAGNOSIS — M51.36 DEGENERATIVE DISC DISEASE, LUMBAR: ICD-10-CM

## 2024-01-02 DIAGNOSIS — G43.709 NON-REFRACTORY CHRONIC MIGRAINE WITHOUT AURA: ICD-10-CM

## 2024-01-02 DIAGNOSIS — M71.21 BAKER'S CYST OF KNEE, RIGHT: ICD-10-CM

## 2024-01-02 DIAGNOSIS — I50.9 CHF, CHRONIC: ICD-10-CM

## 2024-01-02 DIAGNOSIS — E66.09 CLASS 1 OBESITY DUE TO EXCESS CALORIES WITH SERIOUS COMORBIDITY AND BODY MASS INDEX (BMI) OF 30.0 TO 30.9 IN ADULT: ICD-10-CM

## 2024-01-02 DIAGNOSIS — K21.9 GERD (GASTROESOPHAGEAL REFLUX DISEASE): Chronic | ICD-10-CM

## 2024-01-02 DIAGNOSIS — E78.5 HYPERLIPIDEMIA: ICD-10-CM

## 2024-01-02 DIAGNOSIS — E03.9 HYPOTHYROIDISM: ICD-10-CM

## 2024-01-02 DIAGNOSIS — L81.6 POIKILODERMA: ICD-10-CM

## 2024-01-02 DIAGNOSIS — J30.9 ALLERGIC RHINITIS: ICD-10-CM

## 2024-01-02 DIAGNOSIS — M47.817 LUMBOSACRAL SPONDYLOSIS: ICD-10-CM

## 2024-01-02 DIAGNOSIS — M77.40 METATARSALGIA: ICD-10-CM

## 2024-01-02 DIAGNOSIS — K57.30 DIVERTICULOSIS OF LARGE INTESTINE WITHOUT HEMORRHAGE: ICD-10-CM

## 2024-01-02 DIAGNOSIS — M54.16 LUMBAR RADICULOPATHY: ICD-10-CM

## 2024-01-02 DIAGNOSIS — G89.29 CHRONIC PAIN OF RIGHT KNEE: ICD-10-CM

## 2024-01-02 DIAGNOSIS — K83.9 BILE DUCT ABNORMALITY: ICD-10-CM

## 2024-01-02 DIAGNOSIS — Z85.828 HISTORY OF MALIGNANT NEOPLASM OF SKIN: ICD-10-CM

## 2024-01-02 DIAGNOSIS — D64.9 ANEMIA: ICD-10-CM

## 2024-01-02 DIAGNOSIS — G89.29 CHRONIC CERVICAL PAIN: ICD-10-CM

## 2024-01-02 DIAGNOSIS — I11.0 LVH (LEFT VENTRICULAR HYPERTROPHY) DUE TO HYPERTENSIVE DISEASE, WITH HEART FAILURE: Chronic | ICD-10-CM

## 2024-01-02 DIAGNOSIS — E53.8 B12 DEFICIENCY: ICD-10-CM

## 2024-01-02 DIAGNOSIS — R93.3 ABNORMAL FINDING ON GI TRACT IMAGING: ICD-10-CM

## 2024-01-02 DIAGNOSIS — G24.3 ISOLATED CERVICAL DYSTONIA: ICD-10-CM

## 2024-01-02 DIAGNOSIS — J44.9 COPD (CHRONIC OBSTRUCTIVE PULMONARY DISEASE): Chronic | ICD-10-CM

## 2024-01-02 DIAGNOSIS — R55 SYNCOPE AND COLLAPSE: ICD-10-CM

## 2024-01-02 DIAGNOSIS — M10.9 GOUT: ICD-10-CM

## 2024-01-02 DIAGNOSIS — F17.200 CURRENT SMOKER: Chronic | ICD-10-CM

## 2024-01-02 DIAGNOSIS — R68.89 ACTIVITY INTOLERANCE: ICD-10-CM

## 2024-01-02 DIAGNOSIS — R19.7 DIARRHEA: ICD-10-CM

## 2024-01-02 DIAGNOSIS — E65 ABDOMINAL OBESITY: ICD-10-CM

## 2024-01-02 DIAGNOSIS — S46.009A ROTATOR CUFF INJURY: ICD-10-CM

## 2024-01-02 DIAGNOSIS — G62.9 PERIPHERAL NEUROPATHY: ICD-10-CM

## 2024-01-02 DIAGNOSIS — M67.80 TENDINOSIS: ICD-10-CM

## 2024-01-02 DIAGNOSIS — E03.4 HYPOTHYROIDISM DUE TO ACQUIRED ATROPHY OF THYROID: ICD-10-CM

## 2024-01-02 DIAGNOSIS — M46.1 SACROILIITIS: ICD-10-CM

## 2024-01-02 DIAGNOSIS — H04.123 DRY EYES: ICD-10-CM

## 2024-01-02 DIAGNOSIS — M54.59 PAIN OF LUMBAR FACET JOINT: ICD-10-CM

## 2024-01-02 DIAGNOSIS — M25.561 CHRONIC PAIN OF RIGHT KNEE: ICD-10-CM

## 2024-01-02 DIAGNOSIS — Z78.9 ASPIRIN INTOLERANCE: ICD-10-CM

## 2024-01-02 DIAGNOSIS — I25.10 CAD (CORONARY ARTERY DISEASE): ICD-10-CM

## 2024-01-02 DIAGNOSIS — M96.1 FAILED BACK SYNDROME OF LUMBAR SPINE: ICD-10-CM

## 2024-01-02 DIAGNOSIS — M79.7 FIBROMYALGIA: ICD-10-CM

## 2024-01-02 DIAGNOSIS — M54.50 CHRONIC BILATERAL LOW BACK PAIN WITHOUT SCIATICA: ICD-10-CM

## 2024-01-02 DIAGNOSIS — Z82.49 FAMILY HISTORY OF PREMATURE CAD: ICD-10-CM

## 2024-01-02 DIAGNOSIS — R29.6 MULTIPLE FALLS: ICD-10-CM

## 2024-01-02 PROCEDURE — 93010 ELECTROCARDIOGRAM REPORT: CPT | Mod: ,,, | Performed by: INTERNAL MEDICINE

## 2024-01-02 PROCEDURE — 71046 X-RAY EXAM CHEST 2 VIEWS: CPT | Mod: TC

## 2024-01-02 PROCEDURE — 71046 X-RAY EXAM CHEST 2 VIEWS: CPT | Mod: 26,,, | Performed by: RADIOLOGY

## 2024-01-02 PROCEDURE — 93005 ELECTROCARDIOGRAM TRACING: CPT

## 2024-01-02 NOTE — ANESTHESIA PREPROCEDURE EVALUATION
01/02/2024  Alessia Nelson is a 68 y.o., female.      Pre-op Assessment    I have reviewed the Patient Summary Reports.     I have reviewed the Nursing Notes. I have reviewed the NPO Status.   I have reviewed the Medications.     Review of Systems  Anesthesia Hx:             Denies Family Hx of Anesthesia complications.   Personal Hx of Anesthesia complications, Post-Operative Nausea/Vomiting                    Social:  Former Smoker       Cardiovascular:     Hypertension   CAD (s/p stent 2017, negative stress 2020, EF 65%, saw cardiology 11/2023)                                        Pulmonary:   COPD                     Renal/:  Chronic Renal Disease (CKD3)                Hepatic/GI:     GERD   Chronic pancreatitis on pancreatic enzymes          Musculoskeletal:  Arthritis               Neurological:  TIA, (2017.  Denies stroke.)     Seizures (states had a seizure once from phenergan.  None since.  Not on meds.)                                Endocrine:   Hypothyroidism          Psych:   anxiety depression                Physical Exam  General: Well nourished, Cooperative, Alert and Oriented    Airway:  Mallampati: II   Mouth Opening: Normal  TM Distance: 4 - 6 cm  Tongue: Normal  Neck ROM: Extension Decreased, Left Lateral Motion Decreased, Right Lateral Motion Decreased    Dental:  Edentulous    Chest/Lungs:  Clear to auscultation, Normal Respiratory Rate    Heart:  Rate: Normal  Rhythm: Regular Rhythm        Anesthesia Plan  Type of Anesthesia, risks & benefits discussed:    Anesthesia Type: Gen Natural Airway  Intra-op Monitoring Plan: Standard ASA Monitors  Post Op Pain Control Plan: multimodal analgesia  Induction:  IV  Informed Consent: Informed consent signed with the Patient and all parties understand the risks and agree with anesthesia plan.  All questions answered.   ASA Score: 3  Day of  Surgery Review of History & Physical: H&P Update referred to the surgeon/provider.    Ready For Surgery From Anesthesia Perspective.         .

## 2024-01-03 ENCOUNTER — ANESTHESIA (OUTPATIENT)
Dept: SURGERY | Facility: HOSPITAL | Age: 69
End: 2024-01-03
Payer: MEDICARE

## 2024-01-03 ENCOUNTER — TELEPHONE (OUTPATIENT)
Dept: FAMILY MEDICINE | Facility: CLINIC | Age: 69
End: 2024-01-03
Payer: MEDICARE

## 2024-01-03 ENCOUNTER — HOSPITAL ENCOUNTER (OUTPATIENT)
Facility: HOSPITAL | Age: 69
Discharge: HOME | End: 2024-01-03
Attending: OTOLARYNGOLOGY | Admitting: OTOLARYNGOLOGY
Payer: MEDICARE

## 2024-01-03 VITALS
RESPIRATION RATE: 14 BRPM | SYSTOLIC BLOOD PRESSURE: 134 MMHG | DIASTOLIC BLOOD PRESSURE: 66 MMHG | TEMPERATURE: 97 F | WEIGHT: 189 LBS | HEART RATE: 67 BPM | OXYGEN SATURATION: 95 % | BODY MASS INDEX: 29.66 KG/M2 | HEIGHT: 67 IN

## 2024-01-03 PROBLEM — R13.14 PHARYNGOESOPHAGEAL DYSPHAGIA: Status: ACTIVE | Noted: 2024-01-03

## 2024-01-03 PROBLEM — R49.0 HOARSENESS, PERSISTENT: Status: ACTIVE | Noted: 2024-01-03

## 2024-01-03 PROBLEM — J42 CHRONIC BRONCHITIS: Status: ACTIVE | Noted: 2024-01-03

## 2024-01-03 PROCEDURE — 37000008 HC ANESTHESIA 1ST 15 MINUTES: Performed by: OTOLARYNGOLOGY

## 2024-01-03 PROCEDURE — 37000009 HC ANESTHESIA EA ADD 15 MINS: Performed by: OTOLARYNGOLOGY

## 2024-01-03 PROCEDURE — D9220A PRA ANESTHESIA: Mod: ANES,,, | Performed by: ANESTHESIOLOGY

## 2024-01-03 PROCEDURE — 71000015 HC POSTOP RECOV 1ST HR: Performed by: OTOLARYNGOLOGY

## 2024-01-03 PROCEDURE — 36000708 HC OR TIME LEV III 1ST 15 MIN: Performed by: OTOLARYNGOLOGY

## 2024-01-03 PROCEDURE — 25000003 PHARM REV CODE 250: Performed by: NURSE ANESTHETIST, CERTIFIED REGISTERED

## 2024-01-03 PROCEDURE — 36000709 HC OR TIME LEV III EA ADD 15 MIN: Performed by: OTOLARYNGOLOGY

## 2024-01-03 PROCEDURE — 25000003 PHARM REV CODE 250

## 2024-01-03 PROCEDURE — 71000033 HC RECOVERY, INTIAL HOUR: Performed by: OTOLARYNGOLOGY

## 2024-01-03 PROCEDURE — 63600175 PHARM REV CODE 636 W HCPCS: Performed by: NURSE ANESTHETIST, CERTIFIED REGISTERED

## 2024-01-03 PROCEDURE — D9220A PRA ANESTHESIA: Mod: CRNA,,, | Performed by: NURSE ANESTHETIST, CERTIFIED REGISTERED

## 2024-01-03 PROCEDURE — 27201423 OPTIME MED/SURG SUP & DEVICES STERILE SUPPLY: Performed by: OTOLARYNGOLOGY

## 2024-01-03 RX ORDER — PROPOFOL 10 MG/ML
VIAL (ML) INTRAVENOUS
Status: DISCONTINUED | OUTPATIENT
Start: 2024-01-03 | End: 2024-01-03

## 2024-01-03 RX ORDER — OXYCODONE HYDROCHLORIDE 5 MG/1
5 TABLET ORAL ONCE AS NEEDED
Status: DISCONTINUED | OUTPATIENT
Start: 2024-01-03 | End: 2024-01-03 | Stop reason: HOSPADM

## 2024-01-03 RX ORDER — ONDANSETRON 2 MG/ML
INJECTION INTRAMUSCULAR; INTRAVENOUS
Status: DISCONTINUED | OUTPATIENT
Start: 2024-01-03 | End: 2024-01-03

## 2024-01-03 RX ORDER — FENTANYL CITRATE 50 UG/ML
INJECTION, SOLUTION INTRAMUSCULAR; INTRAVENOUS
Status: DISCONTINUED | OUTPATIENT
Start: 2024-01-03 | End: 2024-01-03

## 2024-01-03 RX ORDER — SODIUM CHLORIDE, SODIUM LACTATE, POTASSIUM CHLORIDE, CALCIUM CHLORIDE 600; 310; 30; 20 MG/100ML; MG/100ML; MG/100ML; MG/100ML
INJECTION, SOLUTION INTRAVENOUS CONTINUOUS
Status: DISCONTINUED | OUTPATIENT
Start: 2024-01-03 | End: 2024-01-03 | Stop reason: HOSPADM

## 2024-01-03 RX ORDER — SODIUM CITRATE AND CITRIC ACID MONOHYDRATE 334; 500 MG/5ML; MG/5ML
SOLUTION ORAL
Status: COMPLETED
Start: 2024-01-03 | End: 2024-01-03

## 2024-01-03 RX ORDER — SODIUM CHLORIDE, SODIUM LACTATE, POTASSIUM CHLORIDE, CALCIUM CHLORIDE 600; 310; 30; 20 MG/100ML; MG/100ML; MG/100ML; MG/100ML
125 INJECTION, SOLUTION INTRAVENOUS CONTINUOUS
Status: DISCONTINUED | OUTPATIENT
Start: 2024-01-03 | End: 2024-01-03 | Stop reason: HOSPADM

## 2024-01-03 RX ORDER — MEPERIDINE HYDROCHLORIDE 50 MG/ML
12.5 INJECTION INTRAMUSCULAR; INTRAVENOUS; SUBCUTANEOUS EVERY 10 MIN PRN
Status: DISCONTINUED | OUTPATIENT
Start: 2024-01-03 | End: 2024-01-03 | Stop reason: HOSPADM

## 2024-01-03 RX ORDER — LIDOCAINE HYDROCHLORIDE 20 MG/ML
INJECTION, SOLUTION EPIDURAL; INFILTRATION; INTRACAUDAL; PERINEURAL
Status: DISCONTINUED | OUTPATIENT
Start: 2024-01-03 | End: 2024-01-03

## 2024-01-03 RX ORDER — HYDROMORPHONE HYDROCHLORIDE 1 MG/ML
0.5 INJECTION, SOLUTION INTRAMUSCULAR; INTRAVENOUS; SUBCUTANEOUS EVERY 5 MIN PRN
Status: DISCONTINUED | OUTPATIENT
Start: 2024-01-03 | End: 2024-01-03 | Stop reason: HOSPADM

## 2024-01-03 RX ORDER — MIDAZOLAM HYDROCHLORIDE 1 MG/ML
INJECTION INTRAMUSCULAR; INTRAVENOUS
Status: DISCONTINUED | OUTPATIENT
Start: 2024-01-03 | End: 2024-01-03

## 2024-01-03 RX ORDER — EPHEDRINE SULFATE 50 MG/ML
INJECTION, SOLUTION INTRAVENOUS
Status: DISCONTINUED | OUTPATIENT
Start: 2024-01-03 | End: 2024-01-03

## 2024-01-03 RX ORDER — SUCCINYLCHOLINE CHLORIDE 20 MG/ML
INJECTION INTRAMUSCULAR; INTRAVENOUS
Status: DISCONTINUED | OUTPATIENT
Start: 2024-01-03 | End: 2024-01-03

## 2024-01-03 RX ORDER — LIDOCAINE HYDROCHLORIDE 10 MG/ML
1 INJECTION, SOLUTION EPIDURAL; INFILTRATION; INTRACAUDAL; PERINEURAL ONCE
Status: DISCONTINUED | OUTPATIENT
Start: 2024-01-03 | End: 2024-01-03 | Stop reason: HOSPADM

## 2024-01-03 RX ORDER — SODIUM CHLORIDE, SODIUM LACTATE, POTASSIUM CHLORIDE, CALCIUM CHLORIDE 600; 310; 30; 20 MG/100ML; MG/100ML; MG/100ML; MG/100ML
INJECTION, SOLUTION INTRAVENOUS CONTINUOUS PRN
Status: DISCONTINUED | OUTPATIENT
Start: 2024-01-03 | End: 2024-01-03

## 2024-01-03 RX ORDER — SODIUM CITRATE AND CITRIC ACID MONOHYDRATE 334; 500 MG/5ML; MG/5ML
30 SOLUTION ORAL ONCE
Status: COMPLETED | OUTPATIENT
Start: 2024-01-03 | End: 2024-01-03

## 2024-01-03 RX ORDER — ONDANSETRON 2 MG/ML
4 INJECTION INTRAMUSCULAR; INTRAVENOUS DAILY PRN
Status: DISCONTINUED | OUTPATIENT
Start: 2024-01-03 | End: 2024-01-03 | Stop reason: HOSPADM

## 2024-01-03 RX ORDER — DEXAMETHASONE SODIUM PHOSPHATE 4 MG/ML
INJECTION, SOLUTION INTRA-ARTICULAR; INTRALESIONAL; INTRAMUSCULAR; INTRAVENOUS; SOFT TISSUE
Status: DISCONTINUED | OUTPATIENT
Start: 2024-01-03 | End: 2024-01-03

## 2024-01-03 RX ADMIN — ONDANSETRON 8 MG: 2 INJECTION INTRAMUSCULAR; INTRAVENOUS at 07:01

## 2024-01-03 RX ADMIN — EPHEDRINE SULFATE 10 MG: 50 INJECTION INTRAVENOUS at 08:01

## 2024-01-03 RX ADMIN — FENTANYL CITRATE 50 MCG: 50 INJECTION, SOLUTION INTRAMUSCULAR; INTRAVENOUS at 08:01

## 2024-01-03 RX ADMIN — SUCCINYLCHOLINE CHLORIDE 120 MG: 20 INJECTION, SOLUTION INTRAMUSCULAR; INTRAVENOUS at 08:01

## 2024-01-03 RX ADMIN — LIDOCAINE HYDROCHLORIDE 80 MG: 20 INJECTION, SOLUTION EPIDURAL; INFILTRATION; INTRACAUDAL; PERINEURAL at 08:01

## 2024-01-03 RX ADMIN — SODIUM CITRATE AND CITRIC ACID MONOHYDRATE 30 ML: 334; 500 SOLUTION ORAL at 07:01

## 2024-01-03 RX ADMIN — EPHEDRINE SULFATE 15 MG: 50 INJECTION INTRAVENOUS at 08:01

## 2024-01-03 RX ADMIN — SODIUM CITRATE AND CITRIC ACID MONOHYDRATE 30 ML: 500; 334 SOLUTION ORAL at 07:01

## 2024-01-03 RX ADMIN — SODIUM CHLORIDE, POTASSIUM CHLORIDE, SODIUM LACTATE AND CALCIUM CHLORIDE: 600; 310; 30; 20 INJECTION, SOLUTION INTRAVENOUS at 07:01

## 2024-01-03 RX ADMIN — DEXAMETHASONE SODIUM PHOSPHATE 8 MG: 4 INJECTION, SOLUTION INTRAMUSCULAR; INTRAVENOUS at 08:01

## 2024-01-03 RX ADMIN — MIDAZOLAM HYDROCHLORIDE 2 MG: 1 INJECTION, SOLUTION INTRAMUSCULAR; INTRAVENOUS at 07:01

## 2024-01-03 RX ADMIN — PROPOFOL 150 MG: 10 INJECTION, EMULSION INTRAVENOUS at 08:01

## 2024-01-03 NOTE — DISCHARGE SUMMARY
[facility] - Surgery    Discharge Note        SUMMARY     Admit Date: 1/3/2024    Attending Physician: Mir Belle MD     Discharge Physician: Mir Belle MD    Discharge Date: 1/3/2024 8:39 AM      Hospital Course: Patient tolerated procedure well.     Disposition: Home or Self Care    Patient Instructions:   Current Discharge Medication List        CONTINUE these medications which have NOT CHANGED    Details   acetaminophen (TYLENOL) 325 MG tablet Take 2 tablets (650 mg total) by mouth every 6 (six) hours as needed for Pain (Do not take with any other tylenol containing products).  Refills: 0      allopurinoL (ZYLOPRIM) 300 MG tablet Take 1 tablet (300 mg total) by mouth once daily.  Qty: 90 tablet, Refills: 3    Associated Diagnoses: Gout, unspecified cause, unspecified chronicity, unspecified site      busPIRone (BUSPAR) 5 MG Tab Take 1 tablet (5 mg total) by mouth 2 (two) times daily.  Qty: 60 tablet, Refills: 11    Associated Diagnoses: Generalized anxiety disorder      colestipoL (COLESTID) 1 gram Tab Take 2 tablets (2 g total) by mouth once daily.  Qty: 180 tablet, Refills: 3    Associated Diagnoses: Diarrhea, unspecified type      cyanocobalamin 1,000 mcg/mL injection Inject 1 mL (1,000 mcg total) into the muscle every 14 (fourteen) days.  Qty: 10 mL, Refills: 0    Comments: Previous dosing frequency incorrect; takes this twice a month. Thanks  Associated Diagnoses: B12 deficiency      EScitalopram oxalate (LEXAPRO) 20 MG tablet Take 1 tablet (20 mg total) by mouth every evening.  Qty: 90 tablet, Refills: 3      furosemide (LASIX) 20 MG tablet TAKE 1 TABLET(20 MG) BY MOUTH EVERY DAY  Qty: 90 tablet, Refills: 3    Associated Diagnoses: Chronic systolic congestive heart failure      gabapentin (NEURONTIN) 400 MG capsule TAKE 1 CAPSULE TWICE A DAY  Qty: 180 capsule, Refills: 3    Comments: YOUR PATIENT HAS REQUESTED A REFILL OF THIS MEDICATION, PREVIOUSLY AUTHORIZED BY ANOTHER  PRESCRIBER.  Associated Diagnoses: Postlaminectomy syndrome of lumbar region; DDD (degenerative disc disease), lumbar; Chronic pain disorder; Lumbosacral spondylolysis      hydroCHLOROthiazide (HYDRODIURIL) 12.5 MG Tab TAKE 1 TABLET EVERY MONDAY, WEDNESDAY AND FRIDAY  Qty: 90 tablet, Refills: 3    Comments: .  Associated Diagnoses: Essential hypertension      HYDROcodone-acetaminophen (NORCO)  mg per tablet Take 1 tablet by mouth every 8 (eight) hours as needed for Pain.  Qty: 90 tablet, Refills: 0    Comments: Quantity greater than 7 days is medically necessary.  Associated Diagnoses: Chronic pain disorder; Postlaminectomy syndrome of lumbar region      levothyroxine (SYNTHROID) 75 MCG tablet TAKE 1 TABLET DAILY  Qty: 90 tablet, Refills: 3      magnesium oxide (MAG-OX) 400 mg (241.3 mg magnesium) tablet Take 1 tablet (400 mg total) by mouth once daily.      methocarbamoL (ROBAXIN) 750 MG Tab TAKE 1 TABLET EVERY 8 HOURS AS NEEDED FOR MUSCLE SPASMS  Qty: 270 tablet, Refills: 3    Associated Diagnoses: Cervical dystonia; DDD (degenerative disc disease), lumbar; Myofascial pain      metoprolol succinate (TOPROL-XL) 100 MG 24 hr tablet Take 50 mg by mouth once daily. Pt takes 1/2 tab daily      mupirocin (BACTROBAN) 2 % ointment Apply topically 3 (three) times daily.  Qty: 30 g, Refills: 0    Associated Diagnoses: Hematoma      ondansetron (ZOFRAN-ODT) 4 MG TbDL PLACE 2 TABLETS ON THE TONGUE EVERY 8 HOURS AS NEEDED FOR NAUSEA  Qty: 30 tablet, Refills: 2    Associated Diagnoses: Nausea      pantoprazole (PROTONIX) 40 MG tablet TAKE 1 TABLET DAILY  Qty: 90 tablet, Refills: 3    Associated Diagnoses: Gastroesophageal reflux disease, unspecified whether esophagitis present      potassium chloride SA (KLOR-CON M20) 20 MEQ tablet Take 1 tablet (20 mEq total) by mouth once daily.  Qty: 90 tablet, Refills: 3    Associated Diagnoses: Medication refill      rosuvastatin (CRESTOR) 10 MG tablet Take 1 tablet (10 mg total) by  mouth once daily.  Qty: 90 each, Refills: 3      traZODone (DESYREL) 100 MG tablet TAKE 1 TABLET EVERY EVENING  Qty: 90 tablet, Refills: 3    Associated Diagnoses: Primary insomnia      ZENPEP 40,000-126,000- 168,000 unit CpDR Take by mouth.      clopidogreL (PLAVIX) 75 mg tablet TAKE 1 TABLET EVERY DAY  Qty: 90 tablet, Refills: 3      onabotulinumtoxina (BOTOX) 200 unit SolR as directed      sodium,potassium,mag sulfates (SUPREP BOWEL PREP KIT) 17.5-3.13-1.6 gram SolR Take as directed before colonoscopy             Discharge Procedure Orders (must include Diet, Follow-up, Activity):  No discharge procedures on file.     Follow Up:  Follow up as scheduled.  Resume routine diet.  Activity as tolerated.

## 2024-01-03 NOTE — OP NOTE
[facility] - Surgery  Operative Note     SUMMARY     Surgery Date: 1/3/2024       Pre-op Diagnosis:  Dysphagia, unspecified type [R13.10]  Hoarseness [R49.0]  Otalgia of both ears [H92.03]    Post-op Diagnosis:  Post-Op Diagnosis Codes:     * Dysphagia, unspecified type [R13.10]     * Hoarseness [R49.0]     * Otalgia of both ears [H92.03]    Procedure(s) (LRB):  LARYNGOSCOPY, DIRECT, DIAGNOSTIC, WITH BRONCHOSCOPY AND ESOPHAGOSCOPY (N/A)  ESOPHAGOSCOPY, USING BOUGIE, WITH DILATION (N/A)    Surgeon(s) and Role:     * Mir Belle MD - Primary      Estimated Blood Loss: 2 mL    Anesthesia:  General    Description of the findings of the procedure:  Dysphagia, unspecified type [R13.10]  Hoarseness [R49.0]  Otalgia of both ears [H92.03]           Procedure: The patient was taken to the operating room and given a general endotracheal anesthesia.  The oral cavity and pharynx was bimanually palpated and was normal. A laryngascope was used and the larynx hypopharynx and oropharynx examined and found to be normal.  A bronchoscope was taken and placed through the endotracheal tube and a bronchoscopy done.  There was copious mucous presnet but no masses. The esophagoscope was taken and maneuvered into the stomach.  The stomach was normal.  The esophagus was examined while retracting the scope and was normal.  The superior esophageal constrictor was dilated using bouge dilators. This was dilated up to a number 56.  The patient was awakened and taken to the recovery room.

## 2024-01-03 NOTE — ANESTHESIA POSTPROCEDURE EVALUATION
Anesthesia Post Evaluation    Patient: Alessia Nelson    Procedure(s) Performed: Procedure(s) (LRB):  LARYNGOSCOPY, DIRECT, DIAGNOSTIC, WITH BRONCHOSCOPY AND ESOPHAGOSCOPY (N/A)  ESOPHAGOSCOPY, USING BOUGIE, WITH DILATION (N/A)    Final Anesthesia Type: general      Patient location during evaluation: PACU  Patient participation: Yes- Able to Participate  Level of consciousness: awake and alert and oriented  Post-procedure vital signs: reviewed and stable  Pain management: adequate  Airway patency: patent    PONV status at discharge: No PONV  Anesthetic complications: no      Cardiovascular status: hemodynamically stable  Respiratory status: unassisted, spontaneous ventilation and room air  Hydration status: euvolemic  Follow-up not needed.              Vitals Value Taken Time   /66 01/03/24 0950   Temp 36.3 °C (97.4 °F) 01/03/24 0850   Pulse 86 01/03/24 1002   Resp 14 01/03/24 0959   SpO2 94 % 01/03/24 1002   Vitals shown include unvalidated device data.      Event Time   Out of Recovery 09:40:00         Pain/Radha Score: Radha Score: 10 (1/3/2024  9:50 AM)  Modified Radha Score: 19 (1/3/2024  9:35 AM)

## 2024-01-03 NOTE — DISCHARGE INSTRUCTIONS
Emergency room phone number [phone]    Recovery room phone number [phone]     Esophagoscopy Discharge Instructions   About this topic   Your esophagus is the tube from your mouth to your stomach. When you swallow, food moves from your mouth, through your esophagus, down into your stomach. Doctors do an esophagoscopy to look at your esophagus. Your doctor may order the procedure to:  See the cause of swallowing problems, throat pain, continuous throwing up for no known reason, coughing, or bleeding  Check if your esophagus is inflamed, narrowed, or blocked  Check for tumors in the esophagus to see if they are cancer  Check for infections in the throat  Learn more about your reflux  M[place]o dye into the esophagus to check for abnormal cells, like cancer  Take a sample of your esophagus and check for any abnormalities of the tissue. This is called a biopsy.  Look at your voice box or larynx  Give drugs directly onto or into your esophagus  Give laser treatment or ablation. Ablation is the use of heat to kill bad or abnormal cells.  Tie off bleeding veins. This is ligation.  Remove stuck objects inside the throat  What care is needed at home?   Ask your doctor what you need to do when you go home. Make sure you ask questions if you do not understand what the doctor says. This way you will know what you need to do.  Your throat may feel sore. Suck on ice chips or lozenges to help relieve throat pain. Your doctor may give you drugs to help with pain.  Do not eat or drink anything until the numbness in your throat goes away.  Talk to your doctor about when it is safe for you to go back to eating your normal diet and taking your drugs. You can drink fluid once the numbing drugs in your throat wear off.  What follow-up care is needed?   Your doctor may ask you to make visits to the office to check on your progress. Be sure to keep these visits.  The results of this test may help your doctor understand what kind of  problem you have with your esophagus. Together you can make a plan for more care.  What drugs may be needed?   The doctor may order drugs to:  Help with pain  Start a new treatment  Prevent or fight infection  Will physical activity be limited?   You may need to limit your activity for the rest of the day after the procedure. After that, you will likely be able to go back to your normal activities.  What changes to diet are needed?   You may have an upset stomach after the procedure. Eat small amounts of soft foods the day after surgery. Avoid foods that have milk, as they cause mucus in the throat.  Liquids and soft foods like gelatin or soups may be easier to eat at first. Ask your doctor if you need to make any changes to your diet.  What problems could happen?   Injury to the structures around your esophagus  A hole in the esophagus  Throat pain or soreness  Hoarse voice  Trouble swallowing for a few days  Tongue soreness or numbness  Injury to teeth  Infection after biopsy  Air leaking out under the skin. This is subcutaneous emphysema.  Bleeding  Aspiration or inhaling stomach contents into the lungs  Reaction to drugs used for procedure  When do I need to call the doctor?   Signs of infection. These include a fever of 100.4°F (38°C) or higher, chills, cough, more sputum or change in color of sputum, or pain.  Numbness in your throat does not go away  Throat or chest pain gets worse  Redness and numbness on your tongue  Loss of voice  Spit or saliva with blood  Throwing up blood  Black, tarry, or bloody stools  Breathing problems  Difficulty or severe pain with swallowing  Swelling on the outside of your neck, jaw, face, or shoulders  You are not feeling better in 2 to 3 days or you are feeling worse  Teach Back: Helping You Understand   The Teach Back Method helps you understand the information we are giving you. After you talk with the staff, tell them in your own words what you learned. This helps to make  sure the staff has described each thing clearly. It also helps to explain things that may have been confusing. Before going home, make sure you can do these:  I can tell you about my procedure.  I can tell you what changes I need to make with my diet or drugs.  I can tell you what I will do if I have very bad belly pain, throwing up blood, or my belly is hard and swollen.  Where can I learn more?   [employer] [place]  http://www.cancer.gov/cancertopics/pdq/screening/esophageal/Patient/page3   Last Reviewed Date   2021-05-06  Consumer Information Use and Disclaimer   This information is not specific medical advice and does not replace information you receive from your health care provider. This is only a brief summary of general information. It does NOT include all information about conditions, illnesses, injuries, tests, procedures, treatments, therapies, discharge instructions or life-style choices that may apply to you. You must talk with your health care provider for complete information about your health and treatment options. This information should not be used to decide whether or not to accept your health care providers advice, instructions or recommendations. Only your health care provider has the knowledge and training to provide advice that is right for you.  Copyright   Copyright © 2021 [employer] and its affiliates and/or licensors. All rights reserved.

## 2024-01-03 NOTE — BRIEF OP NOTE
[facility] - Surgery  Brief Operative Note     SUMMARY     Surgery Date: 1/3/2024     Surgeon(s) and Role:     * Mir Belle MD - Primary        Pre-op Diagnosis:  Dysphagia, unspecified type [R13.10]  Hoarseness [R49.0]  Otalgia of both ears [H92.03]    Post-op Diagnosis:  Post-Op Diagnosis Codes:     * Dysphagia, unspecified type [R13.10]     * Hoarseness [R49.0]     * Otalgia of both ears [H92.03]    Procedure(s) (LRB):  LARYNGOSCOPY, DIRECT, DIAGNOSTIC, WITH BRONCHOSCOPY AND ESOPHAGOSCOPY (N/A)  ESOPHAGOSCOPY, USING BOUGIE, WITH DILATION (N/A)      Description of the findings of the procedure:  Dysphagia, unspecified type [R13.10]  Hoarseness [R49.0]  Otalgia of both ears [H92.03]      Estimated Blood Loss: 2 mL

## 2024-01-03 NOTE — TELEPHONE ENCOUNTER
----- Message from Kellee Vaughn sent at 1/3/2024  2:33 PM CST -----  Regarding: Needs Medical Documents for Referral (2nd referral and 4th request)  Contact: Dr. Rodriguez's office at [phone]  Type: Needs Medical Documents for Referral  Who Called:  Dr. Rodriguez's office at [phone] fax #[phone]    Additional Information: calling to get lab work, radiology information and last office notes for patient for referral that was sent in October 2023 and again in December 2023. Please call and advise. Thank you

## 2024-01-03 NOTE — TRANSFER OF CARE
"Anesthesia Transfer of Care Note    Patient: Alessia Nelson    Procedure(s) Performed: Procedure(s) (LRB):  LARYNGOSCOPY, DIRECT, DIAGNOSTIC, WITH BRONCHOSCOPY AND ESOPHAGOSCOPY (N/A)  ESOPHAGOSCOPY, USING BOUGIE, WITH DILATION (N/A)    Patient location: PACU    Anesthesia Type: general    Transport from OR: Transported from OR on room air with adequate spontaneous ventilation    Post pain: adequate analgesia    Post assessment: no apparent anesthetic complications and tolerated procedure well    Post vital signs: stable    Level of consciousness: awake, alert and oriented    Nausea/Vomiting: no nausea/vomiting    Complications: none    Transfer of care protocol was followed      Last vitals: Visit Vitals  BP (!) 157/92 (BP Location: Right arm, Patient Position: Lying)   Pulse 67   Temp 36.6 °C (97.9 °F) (Tympanic)   Resp 18   Ht 5' 7" (1.702 m)   Wt 85.7 kg (189 lb)   SpO2 97%   Breastfeeding No   BMI 29.60 kg/m²     "

## 2024-01-03 NOTE — ANESTHESIA PROCEDURE NOTES
Intubation    Date/Time: 1/3/2024 8:05 AM    Performed by: Cesia Michael CRNA  Authorized by: Epifanio Christensen MD    Intubation:     Induction:  Intravenous    Intubated:  Postinduction    Mask Ventilation:  Easy mask    Attempts:  1    Attempted By:  CRNA    Method of Intubation:  Video laryngoscopy    Blade:  Wilkinson 3    Laryngeal View Grade: Grade I - full view of cords      Difficult Airway Encountered?: No      Complications:  None    Airway Device:  Oral endotracheal tube    Airway Device Size:  6.0    Style/Cuff Inflation:  Cuffed (inflated to minimal occlusive pressure)    Tube secured:  22    Secured at:  The lips    Placement Verified By:  Capnometry    Complicating Factors:  None    Findings Post-Intubation:  BS equal bilateral and atraumatic/condition of teeth unchanged

## 2024-01-22 ENCOUNTER — OFFICE VISIT (OUTPATIENT)
Dept: PAIN MEDICINE | Facility: CLINIC | Age: 69
End: 2024-01-22
Payer: MEDICARE

## 2024-01-22 VITALS — BODY MASS INDEX: 29.66 KG/M2 | WEIGHT: 189 LBS | HEART RATE: 80 BPM | OXYGEN SATURATION: 98 % | HEIGHT: 67 IN

## 2024-01-22 DIAGNOSIS — M96.1 POSTLAMINECTOMY SYNDROME OF LUMBAR REGION: ICD-10-CM

## 2024-01-22 DIAGNOSIS — M54.9 DORSALGIA, UNSPECIFIED: ICD-10-CM

## 2024-01-22 DIAGNOSIS — M54.2 CERVICALGIA: ICD-10-CM

## 2024-01-22 DIAGNOSIS — G89.4 CHRONIC PAIN DISORDER: Primary | ICD-10-CM

## 2024-01-22 DIAGNOSIS — M54.12 CERVICAL RADICULOPATHY: Primary | ICD-10-CM

## 2024-01-22 DIAGNOSIS — M54.16 LUMBAR RADICULOPATHY, CHRONIC: ICD-10-CM

## 2024-01-22 DIAGNOSIS — Z79.891 CHRONIC USE OF OPIATE FOR THERAPEUTIC PURPOSE: ICD-10-CM

## 2024-01-22 PROCEDURE — 99214 OFFICE O/P EST MOD 30 MIN: CPT | Mod: PBBFAC

## 2024-01-22 PROCEDURE — 99214 OFFICE O/P EST MOD 30 MIN: CPT | Mod: S$PBB,,,

## 2024-01-22 PROCEDURE — 80347 BENZODIAZEPINES 13 OR MORE: CPT

## 2024-01-22 PROCEDURE — 99999 PR PBB SHADOW E&M-EST. PATIENT-LVL IV: CPT | Mod: PBBFAC,,,

## 2024-01-22 PROCEDURE — 80326 AMPHETAMINES 5 OR MORE: CPT

## 2024-01-22 RX ORDER — HYDROCODONE BITARTRATE AND ACETAMINOPHEN 10; 325 MG/1; MG/1
1 TABLET ORAL EVERY 8 HOURS PRN
Qty: 90 TABLET | Refills: 0 | Status: SHIPPED | OUTPATIENT
Start: 2024-03-07 | End: 2024-04-05

## 2024-01-22 RX ORDER — AZELASTINE 1 MG/ML
2 SPRAY, METERED NASAL 2 TIMES DAILY
COMMUNITY
Start: 2023-12-07

## 2024-01-22 RX ORDER — HYDROCODONE BITARTRATE AND ACETAMINOPHEN 10; 325 MG/1; MG/1
1 TABLET ORAL EVERY 8 HOURS PRN
Qty: 90 TABLET | Refills: 0 | Status: SHIPPED | OUTPATIENT
Start: 2024-04-05 | End: 2024-04-22 | Stop reason: SDUPTHER

## 2024-01-22 RX ORDER — HYDROCODONE BITARTRATE AND ACETAMINOPHEN 10; 325 MG/1; MG/1
1 TABLET ORAL EVERY 8 HOURS PRN
Qty: 90 TABLET | Refills: 0 | Status: SHIPPED | OUTPATIENT
Start: 2024-02-07 | End: 2024-03-07

## 2024-01-22 RX ORDER — FLUTICASONE PROPIONATE 50 MCG
2 SPRAY, SUSPENSION (ML) NASAL 2 TIMES DAILY
COMMUNITY
Start: 2023-12-07

## 2024-01-22 NOTE — PROGRESS NOTES
"  FOLLOW UP NOTE:     CHIEF COMPLAINT: neck and low back pain    INTERVAL HISTORY: Alessia Nelson is a 68 y.o. female with PMH significant for hx of cervical spine surgery, hx of lumbar spine surgery X 3, hx of peritonitis requiring bowel resection, and torticollis (per patient report) presents as an established patient for the continued management of back, hip, and neck pain.  The patient reports diagnosis of torticollis 10 years ago where she received botox injections in her neck to relieve a similar pain. The patient presents today with neck pain and bilateral hand numbness. The patient is s/p ANTHONY at C7-T1 on 11/30/23 and denies of relief.  The patient also reports of her neck "feeling tight." The patient also continues to report of neck pain with radiation into BUE and reports of numbness and tingling into hands and fingers.  Of note, the patient denies benefit of SCS.   She reports that she is taking percocet  mg PO PRN with benefit.  The patient also reports of bilateral jaw pain. The patient reports of radiation of her neck pain to her jaw.   The patient denies of any significant changes in her health since her last appointment. The patient also denies of any changes in the character of her pain since her last appointment. The patient reports that her current pain is a 8/10. Patient denies of any urinary/fecal incontinence, saddle anesthesia, or weakness.     PRIOR HPI: Alessia Nelson is a 65 y.o. female with PMH significant for hx of cervical spine surgery, hx of lumbar spine surgery X 3, hx of peritonitis requiring bowel resection, and torticollis (per patient report) presents as an established patient of Dr. Aragon (new to me) for the evaluation of low back and neck pain. The patient reports that she has been in pain for over twenty years. She reports that she had her neck surgery in 2006/2007 in [place]. She reports of benefit in regards to her neck pain. The patient reports of her initial lumbar " spine surgery in 2007/2008 in [place] as well. She reports of improvement after her initial surgery. She reports that she had a second lumbar spine surgery in 2008/2009 as her pain recurred. She reports of benefit with this surgery as well. She reports of a third lumbar spine surgery in 2012. She reports of doing well for a couple years until she fell out of her bed. The patient reports that her pain has been worsening ever since then. Low back pain > neck pain. The patient localizes her pain to the area across her lower back.  The patient reports of radiation down her RLE to her knee. The patient reports of numbness in her feet and hands bilaterally. The patient describes her pain as a sharp and aching type of pain. The patient reports that her pain is worsened with any activity that requires walking and activities such as doing laundry. The patient reports of benefit with pain medication. The patient reports that her current pain is a 6/10. Patient denies of any urinary/fecal incontinence, saddle anesthesia, or weakness.      In regards to her neck pain, the patient localizes her pain to the middle/right side of her neck. The patient reports of radiation down her RUE to her elbow. The patient reports that her current pain in her neck is a 6/10. Of note, the patient reports that she would be willing to undergo surgery if she has to. I have updated the patient's current pain regimen below.         INTERVENTIONAL PAIN HISTORY:  11/30/2023: Cervical Jessica at C7-T1- no relief.   12/8/2021: C7-T1 cervical interlaminar epidural steroid injection - no relief  9/22/2021:  Right sacroiliac joint injection - significant relief  6/18/2021: Insertion of SCS (Nevro)   6/7/2021: SCS Trial - 70 - 80% benefit  3/1/2021: Caudal epidural steroid injection - no benefit   6/10/2019: L5-S3 Right Medial Branch Nerve Coolief thermal Radiofrequency Ablation via Dr. Aragon - no benefit per chart review     CURRENT PAIN MEDICATIONS:   Percocet  " mg PO TID  OTC Tylenol   Gabapentin 400 mg PO BID  Baclofen 10 mg PO BID/TID  No longer taking:     tizanidine causes dry mouth.           ROS:  Review of Systems   Constitutional:  Negative for chills and fever.   HENT:  Negative for sore throat.    Eyes:  Negative for visual disturbance.   Respiratory:  Negative for shortness of breath.    Cardiovascular:  Negative for chest pain.   Gastrointestinal:  Negative for nausea and vomiting.   Genitourinary:  Negative for difficulty urinating.   Musculoskeletal:  Positive for arthralgias, back pain, gait problem and neck pain.   Skin:  Negative for rash.   Allergic/Immunologic: Negative for immunocompromised state.   Neurological:  Positive for numbness. Negative for syncope.   Hematological:  Does not bruise/bleed easily.   Psychiatric/Behavioral:  Negative for suicidal ideas.    All other systems reviewed and are negative.       MEDICAL, SURGICAL, FAMILY, SOCIAL HX: reviewed    MEDICATIONS/ALLERGIES: reviewed    PHYSICAL EXAM:    VITALS: Vitals reviewed.   Vitals:    01/22/24 0959   Pulse: 80   SpO2: 98%   Weight: 85.7 kg (189 lb)   Height: 5' 7" (1.702 m)   PainSc:   8              UPPER EXTREMITIES: Normal alignment, normal range of motion, no atrophy, no skin changes,  hair growth and nail growth normal and equal bilaterally. No swelling, no tenderness.    LOWER EXTREMITIES:  Normal alignment, normal range of motion, no atrophy, no skin changes,  hair growth and nail growth normal and equal bilaterally. No swelling, no tenderness.     CERVICAL SPINE:  Cervical spine: ROM is full in flexion, extension and lateral rotation with increased pain with passive ROM and lateral rotation.   Spurling's maneuver - deferred given prior fusion  Myofascial exam: Tenderness to palpation across cervical paraspinous region bilaterally. Prior anterior scar is well-healed.      LUMBAR SPINE:    Lumbar spine: ROM is limited with flexion extension and oblique extension with " "increased pain to any maneuvar.    ((+)) Supine straight leg raise R > L  ((+)) Facet loading bilateral  Internal and external rotation of the hip causes no increased pain on either side.  Myofascial exam: Tenderness to palpation across lumbar paraspinous muscles.     ((+)) TTP at the SI joint bilaterally  ((+)) MIKEY's test  ((+)) One leg stand    ((+)) Distraction test    MOTOR: Tone and bulk: normal bilateral upper and lower Strength: normal    SENSATION: Light touch and pinprick intact bilaterally  REFLEXES: normal, symmetric, nonbrisk.  Toes down, no clonus. Negative hilliard's sign bilaterally.  GAIT: normal rise, base, steps, and arm swing.      IMAGING: EMG - 8/11/2022 (Completed at [facility])     " IMPRESSION:   This is an abnormal study.  No electrodiagnostic evidence of neuropathy of left upper extremity of nerves tested or left cervical radiculopathy at this time.  Electrodiagnostic evidence of chronic bilateral lower extremity polyneuropathy in areas tested. Sensory, motor, demyelinating, axonal. Hx of hypothyroidism.   No electrodiagnostic evidence of acute bilateral lumbosacral radiculopathy at this time.     ASSESSMENT:   Patient presents with   1. Cervical radiculopathy    2. Chronic use of opiate for therapeutic purpose    3. Cervicalgia    4. Dorsalgia, unspecified    5. Lumbar radiculopathy, chronic    6. Postlaminectomy syndrome of lumbar region                    PLAN:  I have stressed the importance of physical activity and exercise to improve overall health  Reviewed pertinent imaging and records with patient  Continue to monitor progress of  Cervical ANTHONY at C7-T1  No recent images available. Ordered CT of cervical and lumbar spine.   Refilled Norco  mg PO q 8 hr PRN for breakthrough pain; # 90 tablets; 2 refills.  reviewed without discrepancies.    UDS collected today, last had pain medication last night.   F/U 3 months or sooner if needed     All medication management performed by Dr." romain.    Laura Heath, NP

## 2024-01-27 LAB
6MAM UR QL: NOT DETECTED
7AMINOCLONAZEPAM UR QL: NOT DETECTED
A-OH ALPRAZ UR QL: NOT DETECTED
ALPHA-OH-MIDAZOLAM: NOT DETECTED
ALPRAZ UR QL: NOT DETECTED
AMPHET UR QL SCN: NOT DETECTED
ANNOTATION COMMENT IMP: NORMAL
BARBITURATES UR QL: NEGATIVE
BUPRENORPHINE UR QL: NOT DETECTED
BZE UR QL: NEGATIVE
CARBOXYTHC UR QL: NEGATIVE
CARISOPRODOL UR QL: NEGATIVE
CLONAZEPAM UR QL: NOT DETECTED
CODEINE UR QL: NOT DETECTED
CREAT UR-MCNC: 232.8 MG/DL (ref 20–400)
DIAZEPAM UR QL: NOT DETECTED
ETHYL GLUCURONIDE UR QL: NEGATIVE
FENTANYL UR QL: NOT DETECTED
GABAPENTIN: PRESENT
HYDROCODONE UR QL: PRESENT
HYDROMORPHONE UR QL: PRESENT
LORAZEPAM UR QL: NOT DETECTED
MDA UR QL: NOT DETECTED
MDEA UR QL: NOT DETECTED
MDMA UR QL: NOT DETECTED
ME-PHENIDATE UR QL: NOT DETECTED
METHADONE UR QL: NEGATIVE
METHAMPHET UR QL: NOT DETECTED
MIDAZOLAM UR QL SCN: NOT DETECTED
MORPHINE UR QL: NOT DETECTED
NALOXONE: NOT DETECTED
NORBUPRENORPHINE UR QL CFM: NOT DETECTED
NORDIAZEPAM UR QL: NOT DETECTED
NORFENTANYL UR QL: NOT DETECTED
NORHYDROCODONE UR QL CFM: PRESENT
NORMEPERIDINE UR QL CFM: NOT DETECTED
NOROXYCODONE UR QL CFM: NOT DETECTED
NOROXYMORPHONE UR QL SCN: NOT DETECTED
OXAZEPAM UR QL: NOT DETECTED
OXYCODONE UR QL: NOT DETECTED
OXYMORPHONE UR QL: NOT DETECTED
PATHOLOGY STUDY: NORMAL
PCP UR QL: NEGATIVE
PHENTERMINE UR QL: NOT DETECTED
PREGABALIN: NOT DETECTED
SERVICE CMNT-IMP: NORMAL
TAPENTADOL UR QL SCN: NOT DETECTED
TAPENTADOL UR QL SCN: NOT DETECTED
TEMAZEPAM UR QL: NOT DETECTED
TRAMADOL UR QL: NEGATIVE
ZOLPIDEM METABOLITE: NOT DETECTED
ZOLPIDEM UR QL: NOT DETECTED

## 2024-01-29 ENCOUNTER — OFFICE VISIT (OUTPATIENT)
Dept: PAIN MEDICINE | Facility: CLINIC | Age: 69
End: 2024-01-29
Payer: MEDICARE

## 2024-01-29 VITALS — WEIGHT: 189 LBS | HEIGHT: 67 IN | BODY MASS INDEX: 29.66 KG/M2

## 2024-01-29 DIAGNOSIS — M96.1 POSTLAMINECTOMY SYNDROME OF LUMBAR REGION: ICD-10-CM

## 2024-01-29 DIAGNOSIS — M54.16 LUMBAR RADICULOPATHY, CHRONIC: ICD-10-CM

## 2024-01-29 DIAGNOSIS — M50.30 DDD (DEGENERATIVE DISC DISEASE), CERVICAL: ICD-10-CM

## 2024-01-29 DIAGNOSIS — G89.4 CHRONIC PAIN DISORDER: Primary | ICD-10-CM

## 2024-01-29 PROCEDURE — 99212 OFFICE O/P EST SF 10 MIN: CPT | Mod: PBBFAC,PN | Performed by: ANESTHESIOLOGY

## 2024-01-29 PROCEDURE — 99999 PR PBB SHADOW E&M-EST. PATIENT-LVL II: CPT | Mod: PBBFAC,,, | Performed by: ANESTHESIOLOGY

## 2024-01-29 PROCEDURE — 99214 OFFICE O/P EST MOD 30 MIN: CPT | Mod: S$PBB,,, | Performed by: ANESTHESIOLOGY

## 2024-01-29 NOTE — PROGRESS NOTES
"  FOLLOW UP NOTE:     CHIEF COMPLAINT: neck and low back pain    INTERVAL HISTORY: Alessia Nelson is a 68 y.o. female with PMH significant for hx of cervical spine surgery, hx of lumbar spine surgery X 3, hx of peritonitis requiring bowel resection, and torticollis (per patient report) presents as an established patient for the continued management of back, hip, and neck pain.  The patient reports diagnosis of torticollis 10 years ago where she received botox injections in her neck to relieve a similar pain. The patient presents today with neck pain and bilateral hand numbness. The patient is s/p ANTHONY at C7-T1 on 11/30/23 and denies of relief.  The patient also reports of her neck "feeling tight." The patient also continues to report of neck pain with radiation into BUE and reports of numbness and tingling into hands and fingers.  Of note, the patient denies benefit of SCS.  She desires SCS explant.  She reports that she is taking percocet  mg PO PRN with benefit.  The patient also reports of bilateral jaw pain. The patient reports of radiation of her neck pain to her jaw.   The patient denies of any significant changes in her health since her last appointment. The patient also denies of any changes in the character of her pain since her last appointment. The patient reports that her current pain is a 8/10. Patient denies of any urinary/fecal incontinence, saddle anesthesia, or weakness.     PRIOR HPI: Alessia Nelson is a 65 y.o. female with PMH significant for hx of cervical spine surgery, hx of lumbar spine surgery X 3, hx of peritonitis requiring bowel resection, and torticollis (per patient report) presents as an established patient of Dr. Aragon (new to me) for the evaluation of low back and neck pain. The patient reports that she has been in pain for over twenty years. She reports that she had her neck surgery in 2006/2007 in [place]. She reports of benefit in regards to her neck pain. The patient " reports of her initial lumbar spine surgery in 2007/2008 in [place] as well. She reports of improvement after her initial surgery. She reports that she had a second lumbar spine surgery in 2008/2009 as her pain recurred. She reports of benefit with this surgery as well. She reports of a third lumbar spine surgery in 2012. She reports of doing well for a couple years until she fell out of her bed. The patient reports that her pain has been worsening ever since then. Low back pain > neck pain. The patient localizes her pain to the area across her lower back.  The patient reports of radiation down her RLE to her knee. The patient reports of numbness in her feet and hands bilaterally. The patient describes her pain as a sharp and aching type of pain. The patient reports that her pain is worsened with any activity that requires walking and activities such as doing laundry. The patient reports of benefit with pain medication. The patient reports that her current pain is a 6/10. Patient denies of any urinary/fecal incontinence, saddle anesthesia, or weakness.      In regards to her neck pain, the patient localizes her pain to the middle/right side of her neck. The patient reports of radiation down her RUE to her elbow. The patient reports that her current pain in her neck is a 6/10. Of note, the patient reports that she would be willing to undergo surgery if she has to. I have updated the patient's current pain regimen below.         INTERVENTIONAL PAIN HISTORY:  11/30/2023: Cervical Jessica at C7-T1- no relief.   12/8/2021: C7-T1 cervical interlaminar epidural steroid injection - no relief  9/22/2021:  Right sacroiliac joint injection - significant relief  6/18/2021: Insertion of SCS (Nevro)   6/7/2021: SCS Trial - 70 - 80% benefit  3/1/2021: Caudal epidural steroid injection - no benefit   6/10/2019: L5-S3 Right Medial Branch Nerve Coolief thermal Radiofrequency Ablation via Dr. Aragon - no benefit per chart review     CURRENT  "PAIN MEDICATIONS:   Percocet  mg PO TID  OTC Tylenol   Gabapentin 400 mg PO BID  Baclofen 10 mg PO BID/TID  No longer taking:     tizanidine causes dry mouth.           ROS:  Review of Systems   Constitutional:  Negative for chills and fever.   HENT:  Negative for sore throat.    Eyes:  Negative for visual disturbance.   Respiratory:  Negative for shortness of breath.    Cardiovascular:  Negative for chest pain.   Gastrointestinal:  Negative for nausea and vomiting.   Genitourinary:  Negative for difficulty urinating.   Musculoskeletal:  Positive for arthralgias, back pain, gait problem and neck pain.   Skin:  Negative for rash.   Allergic/Immunologic: Negative for immunocompromised state.   Neurological:  Positive for numbness. Negative for syncope.   Hematological:  Does not bruise/bleed easily.   Psychiatric/Behavioral:  Negative for suicidal ideas.    All other systems reviewed and are negative.       MEDICAL, SURGICAL, FAMILY, SOCIAL HX: reviewed    MEDICATIONS/ALLERGIES: reviewed    PHYSICAL EXAM:    VITALS: Vitals reviewed.   Vitals:    01/29/24 1100   Weight: 85.7 kg (189 lb)   Height: 5' 7" (1.702 m)   PainSc:   7   PainLoc: Neck              UPPER EXTREMITIES: Normal alignment, normal range of motion, no atrophy, no skin changes,  hair growth and nail growth normal and equal bilaterally. No swelling, no tenderness.    LOWER EXTREMITIES:  Normal alignment, normal range of motion, no atrophy, no skin changes,  hair growth and nail growth normal and equal bilaterally. No swelling, no tenderness.     CERVICAL SPINE:  Cervical spine: ROM is full in flexion, extension and lateral rotation with increased pain with passive ROM and lateral rotation.   Spurling's maneuver - deferred given prior fusion  Myofascial exam: Tenderness to palpation across cervical paraspinous region bilaterally. Prior anterior scar is well-healed.      LUMBAR SPINE:    Lumbar spine: ROM is limited with flexion extension and oblique " "extension with increased pain to any maneuvar.    ((+)) Supine straight leg raise R > L  ((+)) Facet loading bilateral  Internal and external rotation of the hip causes no increased pain on either side.  Myofascial exam: Tenderness to palpation across lumbar paraspinous muscles.     ((+)) TTP at the SI joint bilaterally  ((+)) MIKEY's test  ((+)) One leg stand    ((+)) Distraction test    MOTOR: Tone and bulk: normal bilateral upper and lower Strength: normal    SENSATION: Light touch and pinprick intact bilaterally  REFLEXES: normal, symmetric, nonbrisk.  Toes down, no clonus. Negative hilliard's sign bilaterally.  GAIT: normal rise, base, steps, and arm swing.      IMAGING: EMG - 8/11/2022 (Completed at [facility])     " IMPRESSION:   This is an abnormal study.  No electrodiagnostic evidence of neuropathy of left upper extremity of nerves tested or left cervical radiculopathy at this time.  Electrodiagnostic evidence of chronic bilateral lower extremity polyneuropathy in areas tested. Sensory, motor, demyelinating, axonal. Hx of hypothyroidism.   No electrodiagnostic evidence of acute bilateral lumbosacral radiculopathy at this time.     ASSESSMENT:   Patient presents with   1. Chronic pain disorder    2. Postlaminectomy syndrome of lumbar region    3. DDD (degenerative disc disease), cervical    4. Lumbar radiculopathy, chronic                    PLAN:  I have stressed the importance of physical activity and exercise to improve overall health  Reviewed pertinent imaging and records with patient  Continue to monitor progress of  Cervical ANTHONY at C7-T1  No recent images available. Ordered CT of cervical and lumbar spine.   Continue Norco  mg PO q 8 hr PRN for breakthrough pain; # 90 tablets; 2 refills.  reviewed without discrepancies.    Schedule for SCS explant  F/U as scheduled                        "

## 2024-01-29 NOTE — H&P (VIEW-ONLY)
"  FOLLOW UP NOTE:     CHIEF COMPLAINT: neck and low back pain    INTERVAL HISTORY: Alessia Nelson is a 68 y.o. female with PMH significant for hx of cervical spine surgery, hx of lumbar spine surgery X 3, hx of peritonitis requiring bowel resection, and torticollis (per patient report) presents as an established patient for the continued management of back, hip, and neck pain.  The patient reports diagnosis of torticollis 10 years ago where she received botox injections in her neck to relieve a similar pain. The patient presents today with neck pain and bilateral hand numbness. The patient is s/p ANTHONY at C7-T1 on 11/30/23 and denies of relief.  The patient also reports of her neck "feeling tight." The patient also continues to report of neck pain with radiation into BUE and reports of numbness and tingling into hands and fingers.  Of note, the patient denies benefit of SCS.  She desires SCS explant.  She reports that she is taking percocet  mg PO PRN with benefit.  The patient also reports of bilateral jaw pain. The patient reports of radiation of her neck pain to her jaw.   The patient denies of any significant changes in her health since her last appointment. The patient also denies of any changes in the character of her pain since her last appointment. The patient reports that her current pain is a 8/10. Patient denies of any urinary/fecal incontinence, saddle anesthesia, or weakness.     PRIOR HPI: Alessia Nelson is a 65 y.o. female with PMH significant for hx of cervical spine surgery, hx of lumbar spine surgery X 3, hx of peritonitis requiring bowel resection, and torticollis (per patient report) presents as an established patient of Dr. Aragon (new to me) for the evaluation of low back and neck pain. The patient reports that she has been in pain for over twenty years. She reports that she had her neck surgery in 2006/2007 in [place]. She reports of benefit in regards to her neck pain. The patient " reports of her initial lumbar spine surgery in 2007/2008 in [place] as well. She reports of improvement after her initial surgery. She reports that she had a second lumbar spine surgery in 2008/2009 as her pain recurred. She reports of benefit with this surgery as well. She reports of a third lumbar spine surgery in 2012. She reports of doing well for a couple years until she fell out of her bed. The patient reports that her pain has been worsening ever since then. Low back pain > neck pain. The patient localizes her pain to the area across her lower back.  The patient reports of radiation down her RLE to her knee. The patient reports of numbness in her feet and hands bilaterally. The patient describes her pain as a sharp and aching type of pain. The patient reports that her pain is worsened with any activity that requires walking and activities such as doing laundry. The patient reports of benefit with pain medication. The patient reports that her current pain is a 6/10. Patient denies of any urinary/fecal incontinence, saddle anesthesia, or weakness.      In regards to her neck pain, the patient localizes her pain to the middle/right side of her neck. The patient reports of radiation down her RUE to her elbow. The patient reports that her current pain in her neck is a 6/10. Of note, the patient reports that she would be willing to undergo surgery if she has to. I have updated the patient's current pain regimen below.         INTERVENTIONAL PAIN HISTORY:  11/30/2023: Cervical Jessica at C7-T1- no relief.   12/8/2021: C7-T1 cervical interlaminar epidural steroid injection - no relief  9/22/2021:  Right sacroiliac joint injection - significant relief  6/18/2021: Insertion of SCS (Nevro)   6/7/2021: SCS Trial - 70 - 80% benefit  3/1/2021: Caudal epidural steroid injection - no benefit   6/10/2019: L5-S3 Right Medial Branch Nerve Coolief thermal Radiofrequency Ablation via Dr. Aragon - no benefit per chart review     CURRENT  "PAIN MEDICATIONS:   Percocet  mg PO TID  OTC Tylenol   Gabapentin 400 mg PO BID  Baclofen 10 mg PO BID/TID  No longer taking:     tizanidine causes dry mouth.           ROS:  Review of Systems   Constitutional:  Negative for chills and fever.   HENT:  Negative for sore throat.    Eyes:  Negative for visual disturbance.   Respiratory:  Negative for shortness of breath.    Cardiovascular:  Negative for chest pain.   Gastrointestinal:  Negative for nausea and vomiting.   Genitourinary:  Negative for difficulty urinating.   Musculoskeletal:  Positive for arthralgias, back pain, gait problem and neck pain.   Skin:  Negative for rash.   Allergic/Immunologic: Negative for immunocompromised state.   Neurological:  Positive for numbness. Negative for syncope.   Hematological:  Does not bruise/bleed easily.   Psychiatric/Behavioral:  Negative for suicidal ideas.    All other systems reviewed and are negative.       MEDICAL, SURGICAL, FAMILY, SOCIAL HX: reviewed    MEDICATIONS/ALLERGIES: reviewed    PHYSICAL EXAM:    VITALS: Vitals reviewed.   Vitals:    01/29/24 1100   Weight: 85.7 kg (189 lb)   Height: 5' 7" (1.702 m)   PainSc:   7   PainLoc: Neck              UPPER EXTREMITIES: Normal alignment, normal range of motion, no atrophy, no skin changes,  hair growth and nail growth normal and equal bilaterally. No swelling, no tenderness.    LOWER EXTREMITIES:  Normal alignment, normal range of motion, no atrophy, no skin changes,  hair growth and nail growth normal and equal bilaterally. No swelling, no tenderness.     CERVICAL SPINE:  Cervical spine: ROM is full in flexion, extension and lateral rotation with increased pain with passive ROM and lateral rotation.   Spurling's maneuver - deferred given prior fusion  Myofascial exam: Tenderness to palpation across cervical paraspinous region bilaterally. Prior anterior scar is well-healed.      LUMBAR SPINE:    Lumbar spine: ROM is limited with flexion extension and oblique " "extension with increased pain to any maneuvar.    ((+)) Supine straight leg raise R > L  ((+)) Facet loading bilateral  Internal and external rotation of the hip causes no increased pain on either side.  Myofascial exam: Tenderness to palpation across lumbar paraspinous muscles.     ((+)) TTP at the SI joint bilaterally  ((+)) MIKEY's test  ((+)) One leg stand    ((+)) Distraction test    MOTOR: Tone and bulk: normal bilateral upper and lower Strength: normal    SENSATION: Light touch and pinprick intact bilaterally  REFLEXES: normal, symmetric, nonbrisk.  Toes down, no clonus. Negative hilliard's sign bilaterally.  GAIT: normal rise, base, steps, and arm swing.      IMAGING: EMG - 8/11/2022 (Completed at [facility])     " IMPRESSION:   This is an abnormal study.  No electrodiagnostic evidence of neuropathy of left upper extremity of nerves tested or left cervical radiculopathy at this time.  Electrodiagnostic evidence of chronic bilateral lower extremity polyneuropathy in areas tested. Sensory, motor, demyelinating, axonal. Hx of hypothyroidism.   No electrodiagnostic evidence of acute bilateral lumbosacral radiculopathy at this time.     ASSESSMENT:   Patient presents with   1. Chronic pain disorder    2. Postlaminectomy syndrome of lumbar region    3. DDD (degenerative disc disease), cervical    4. Lumbar radiculopathy, chronic                    PLAN:  I have stressed the importance of physical activity and exercise to improve overall health  Reviewed pertinent imaging and records with patient  Continue to monitor progress of  Cervical ANTHONY at C7-T1  No recent images available. Ordered CT of cervical and lumbar spine.   Continue Norco  mg PO q 8 hr PRN for breakthrough pain; # 90 tablets; 2 refills.  reviewed without discrepancies.    Schedule for SCS explant  F/U as scheduled                        "

## 2024-01-31 ENCOUNTER — TELEPHONE (OUTPATIENT)
Dept: PAIN MEDICINE | Facility: CLINIC | Age: 69
End: 2024-01-31
Payer: MEDICARE

## 2024-01-31 ENCOUNTER — TELEPHONE (OUTPATIENT)
Dept: CARDIOLOGY | Facility: CLINIC | Age: 69
End: 2024-01-31

## 2024-01-31 DIAGNOSIS — M96.1 POSTLAMINECTOMY SYNDROME OF LUMBAR REGION: Primary | ICD-10-CM

## 2024-01-31 NOTE — LETTER
2024    Alessia Nelson  [address] [place] [address]             [facility]  [address]  [place] [address]  Phone: [phone]  Fax: [phone] Patient: Alessia Nelson  : 1955  Referring Doctor: Dr. Carroll   Procedure: spinal cord stimulator explant     Current Outpatient Medications   Medication Sig    acetaminophen (TYLENOL) 325 MG tablet Take 2 tablets (650 mg total) by mouth every 6 (six) hours as needed for Pain (Do not take with any other tylenol containing products).    allopurinoL (ZYLOPRIM) 300 MG tablet Take 1 tablet (300 mg total) by mouth once daily.    azelastine (ASTELIN) 137 mcg (0.1 %) nasal spray 2 sprays 2 (two) times daily.    busPIRone (BUSPAR) 5 MG Tab Take 1 tablet (5 mg total) by mouth 2 (two) times daily.    clopidogreL (PLAVIX) 75 mg tablet TAKE 1 TABLET EVERY DAY    colestipoL (COLESTID) 1 gram Tab Take 2 tablets (2 g total) by mouth once daily.    cyanocobalamin 1,000 mcg/mL injection Inject 1 mL (1,000 mcg total) into the muscle every 14 (fourteen) days.    EScitalopram oxalate (LEXAPRO) 20 MG tablet Take 1 tablet (20 mg total) by mouth every evening.    fluticasone propionate (FLONASE) 50 mcg/actuation nasal spray 2 sprays by Each Nostril route 2 (two) times daily.    furosemide (LASIX) 20 MG tablet TAKE 1 TABLET(20 MG) BY MOUTH EVERY DAY    gabapentin (NEURONTIN) 400 MG capsule TAKE 1 CAPSULE TWICE A DAY    hydroCHLOROthiazide (HYDRODIURIL) 12.5 MG Tab TAKE 1 TABLET EVERY MONDAY, WEDNESDAY AND FRIDAY    [START ON 2024] HYDROcodone-acetaminophen (NORCO)  mg per tablet Take 1 tablet by mouth every 8 (eight) hours as needed for Pain.    [START ON 3/7/2024] HYDROcodone-acetaminophen (NORCO)  mg per tablet Take 1 tablet by mouth every 8 (eight) hours as needed for Pain.    [START ON 2024] HYDROcodone-acetaminophen (NORCO)  mg per tablet Take 1 tablet by  mouth every 8 (eight) hours as needed for Pain.    levothyroxine (SYNTHROID) 75 MCG tablet TAKE 1 TABLET DAILY    magnesium oxide (MAG-OX) 400 mg (241.3 mg magnesium) tablet Take 1 tablet (400 mg total) by mouth once daily.    methocarbamoL (ROBAXIN) 750 MG Tab TAKE 1 TABLET EVERY 8 HOURS AS NEEDED FOR MUSCLE SPASMS    metoprolol succinate (TOPROL-XL) 100 MG 24 hr tablet Take 50 mg by mouth once daily. Pt takes 1/2 tab daily    mupirocin (BACTROBAN) 2 % ointment Apply topically 3 (three) times daily.    onabotulinumtoxina (BOTOX) 200 unit SolR as directed    ondansetron (ZOFRAN-ODT) 4 MG TbDL PLACE 2 TABLETS ON THE TONGUE EVERY 8 HOURS AS NEEDED FOR NAUSEA    pantoprazole (PROTONIX) 40 MG tablet TAKE 1 TABLET DAILY    potassium chloride SA (KLOR-CON M20) 20 MEQ tablet Take 1 tablet (20 mEq total) by mouth once daily.    rosuvastatin (CRESTOR) 10 MG tablet Take 1 tablet (10 mg total) by mouth once daily.    sodium,potassium,mag sulfates (SUPREP BOWEL PREP KIT) 17.5-3.13-1.6 gram SolR Take as directed before colonoscopy    traZODone (DESYREL) 100 MG tablet TAKE 1 TABLET EVERY EVENING    ZENPEP 40,000-126,000- 168,000 unit CpDR Take by mouth.     No current facility-administered medications for this visit.     Facility-Administered Medications Ordered in Other Visits   Medication    electrolyte-S (ISOLYTE)    fentaNYL 50 mcg/mL injection 25 mcg    HYDROmorphone injection 0.2 mg    lactated ringers infusion    lactated ringers infusion    lactated ringers infusion    lactated ringers infusion    LIDOcaine (PF) 10 mg/ml (1%) injection 10 mg    lorazepam injection 0.25 mg    oxyCODONE immediate release tablet 5 mg    prochlorperazine injection Soln 5 mg    sodium chloride 0.9% flush 3 mL       This patient has been assessed for risk factors for clearance of surgery with the following stipulations:    ___ No contraindications  ___ Recommendations for hold PLAVIX x ___ days.  ___ Cleared for surgery with moderate risks.    ___ Not cleared for surgery due to the following reasons:      If you have any questions regarding the above, please contact my office at [phone].    Sincerely,

## 2024-01-31 NOTE — TELEPHONE ENCOUNTER
Hello, this pt is going to have a spinal cord stimulator explant on 02/21/2024 may she hold Plavix x 7 days prior? And be cleared to have MAC sedation please? Thank you

## 2024-01-31 NOTE — TELEPHONE ENCOUNTER
Types of orders made on 01/29/2024: Procedure Request      Order Date:1/29/2024   Ordering User:LADY CARROLL [[id number]   Encounter Provider:Lady Carroll MD [[id number]]   Authorizing Provider: Lady Flores MD [[id number]]   Department:[facility] PAIN MANAGEMENT[[id number]]      Common Order Information   Procedure -> Other (Specify location and laterality) Cmt: SCS explant      Order Specific Information   Order: Procedure Order to Pain Management [Custom: [id number]]  Order #:          [id number]: [id number] FUTURE     Priority: Routine  Class: Clinic Performed     Future Order Information       Expires on:01/29/2025              Expected by:01/29/2024                   Associated Diagnoses       G89.4 Chronic pain disorder       Facility Name: -> [facility]              Priority: Routine  Class: Clinic Performed     Future Order Information       Expires on:01/29/2025            Expected by:01/29/2024                   Associated Diagnoses       G89.4 Chronic pain disorder       Procedure -> Other (Specify location and laterality) Cmt   : SCS explant          Facility Name: -> [facility]

## 2024-02-05 ENCOUNTER — HOSPITAL ENCOUNTER (OUTPATIENT)
Dept: RADIOLOGY | Facility: HOSPITAL | Age: 69
Discharge: HOME | End: 2024-02-05
Payer: MEDICARE

## 2024-02-05 DIAGNOSIS — M96.1 POSTLAMINECTOMY SYNDROME OF LUMBAR REGION: ICD-10-CM

## 2024-02-05 DIAGNOSIS — M54.12 CERVICAL RADICULOPATHY: ICD-10-CM

## 2024-02-05 DIAGNOSIS — M54.9 DORSALGIA, UNSPECIFIED: ICD-10-CM

## 2024-02-05 DIAGNOSIS — M54.2 CERVICALGIA: ICD-10-CM

## 2024-02-05 DIAGNOSIS — M54.16 LUMBAR RADICULOPATHY, CHRONIC: ICD-10-CM

## 2024-02-05 PROCEDURE — 72125 CT NECK SPINE W/O DYE: CPT | Mod: TC

## 2024-02-05 PROCEDURE — 72131 CT LUMBAR SPINE W/O DYE: CPT | Mod: 26,,, | Performed by: RADIOLOGY

## 2024-02-05 PROCEDURE — 72125 CT NECK SPINE W/O DYE: CPT | Mod: 26,,, | Performed by: RADIOLOGY

## 2024-02-05 PROCEDURE — 72131 CT LUMBAR SPINE W/O DYE: CPT | Mod: TC

## 2024-02-06 ENCOUNTER — OFFICE VISIT (OUTPATIENT)
Dept: CARDIOLOGY | Facility: CLINIC | Age: 69
End: 2024-02-06
Payer: MEDICARE

## 2024-02-06 ENCOUNTER — TELEPHONE (OUTPATIENT)
Dept: PAIN MEDICINE | Facility: CLINIC | Age: 69
End: 2024-02-06
Payer: MEDICARE

## 2024-02-06 VITALS
WEIGHT: 193 LBS | RESPIRATION RATE: 16 BRPM | SYSTOLIC BLOOD PRESSURE: 130 MMHG | OXYGEN SATURATION: 98 % | BODY MASS INDEX: 30.29 KG/M2 | HEART RATE: 71 BPM | DIASTOLIC BLOOD PRESSURE: 70 MMHG | HEIGHT: 67 IN

## 2024-02-06 DIAGNOSIS — G56.03 CARPAL TUNNEL SYNDROME ON BOTH SIDES: Primary | ICD-10-CM

## 2024-02-06 DIAGNOSIS — I25.10 ATHEROSCLEROSIS OF NATIVE CORONARY ARTERY OF NATIVE HEART WITHOUT ANGINA PECTORIS: ICD-10-CM

## 2024-02-06 DIAGNOSIS — Z01.818 PRE-OP EVALUATION: Primary | ICD-10-CM

## 2024-02-06 DIAGNOSIS — I10 PRIMARY HYPERTENSION: ICD-10-CM

## 2024-02-06 DIAGNOSIS — M54.12 CERVICAL RADICULOPATHY: ICD-10-CM

## 2024-02-06 DIAGNOSIS — R00.1 BRADYCARDIA: ICD-10-CM

## 2024-02-06 PROCEDURE — 99213 OFFICE O/P EST LOW 20 MIN: CPT | Mod: PBBFAC,PN | Performed by: STUDENT IN AN ORGANIZED HEALTH CARE EDUCATION/TRAINING PROGRAM

## 2024-02-06 PROCEDURE — 99999 PR PBB SHADOW E&M-EST. PATIENT-LVL III: CPT | Mod: PBBFAC,,, | Performed by: STUDENT IN AN ORGANIZED HEALTH CARE EDUCATION/TRAINING PROGRAM

## 2024-02-06 PROCEDURE — 99214 OFFICE O/P EST MOD 30 MIN: CPT | Mod: S$PBB,,, | Performed by: STUDENT IN AN ORGANIZED HEALTH CARE EDUCATION/TRAINING PROGRAM

## 2024-02-06 NOTE — LETTER
2024    Alessia Nelson  [address] [place] [address]             [facility]  [address]  [facility] [address]  Phone: [phone]  Fax: [phone] Patient: Alessia Nelson  : 1955  Referring Doctor:Dr. GENTILE  Removal of Neurostimulator     Current Outpatient Medications   Medication Sig    acetaminophen (TYLENOL) 325 MG tablet Take 2 tablets (650 mg total) by mouth every 6 (six) hours as needed for Pain (Do not take with any other tylenol containing products).    allopurinoL (ZYLOPRIM) 300 MG tablet Take 1 tablet (300 mg total) by mouth once daily.    azelastine (ASTELIN) 137 mcg (0.1 %) nasal spray 2 sprays 2 (two) times daily.    busPIRone (BUSPAR) 5 MG Tab Take 1 tablet (5 mg total) by mouth 2 (two) times daily.    clopidogreL (PLAVIX) 75 mg tablet TAKE 1 TABLET EVERY DAY    colestipoL (COLESTID) 1 gram Tab Take 2 tablets (2 g total) by mouth once daily.    cyanocobalamin 1,000 mcg/mL injection Inject 1 mL (1,000 mcg total) into the muscle every 14 (fourteen) days.    EScitalopram oxalate (LEXAPRO) 20 MG tablet Take 1 tablet (20 mg total) by mouth every evening.    fluticasone propionate (FLONASE) 50 mcg/actuation nasal spray 2 sprays by Each Nostril route 2 (two) times daily.    furosemide (LASIX) 20 MG tablet TAKE 1 TABLET(20 MG) BY MOUTH EVERY DAY    gabapentin (NEURONTIN) 400 MG capsule TAKE 1 CAPSULE TWICE A DAY    hydroCHLOROthiazide (HYDRODIURIL) 12.5 MG Tab TAKE 1 TABLET EVERY MONDAY, WEDNESDAY AND FRIDAY    [START ON 2024] HYDROcodone-acetaminophen (NORCO)  mg per tablet Take 1 tablet by mouth every 8 (eight) hours as needed for Pain.    [START ON 3/7/2024] HYDROcodone-acetaminophen (NORCO)  mg per tablet Take 1 tablet by mouth every 8 (eight) hours as needed for Pain.    [START ON 2024] HYDROcodone-acetaminophen (NORCO)  mg per tablet Take 1 tablet by mouth  every 8 (eight) hours as needed for Pain.    levothyroxine (SYNTHROID) 75 MCG tablet TAKE 1 TABLET DAILY    magnesium oxide (MAG-OX) 400 mg (241.3 mg magnesium) tablet Take 1 tablet (400 mg total) by mouth once daily.    methocarbamoL (ROBAXIN) 750 MG Tab TAKE 1 TABLET EVERY 8 HOURS AS NEEDED FOR MUSCLE SPASMS    metoprolol succinate (TOPROL-XL) 100 MG 24 hr tablet Take 50 mg by mouth once daily. Pt takes 1/2 tab daily    mupirocin (BACTROBAN) 2 % ointment Apply topically 3 (three) times daily.    onabotulinumtoxina (BOTOX) 200 unit SolR as directed    ondansetron (ZOFRAN-ODT) 4 MG TbDL PLACE 2 TABLETS ON THE TONGUE EVERY 8 HOURS AS NEEDED FOR NAUSEA    pantoprazole (PROTONIX) 40 MG tablet TAKE 1 TABLET DAILY    potassium chloride SA (KLOR-CON M20) 20 MEQ tablet Take 1 tablet (20 mEq total) by mouth once daily.    rosuvastatin (CRESTOR) 10 MG tablet Take 1 tablet (10 mg total) by mouth once daily.    sodium,potassium,mag sulfates (SUPREP BOWEL PREP KIT) 17.5-3.13-1.6 gram SolR Take as directed before colonoscopy    traZODone (DESYREL) 100 MG tablet TAKE 1 TABLET EVERY EVENING    ZENPEP 40,000-126,000- 168,000 unit CpDR Take by mouth.     No current facility-administered medications for this visit.     Facility-Administered Medications Ordered in Other Visits   Medication    electrolyte-S (ISOLYTE)    fentaNYL 50 mcg/mL injection 25 mcg    HYDROmorphone injection 0.2 mg    lactated ringers infusion    lactated ringers infusion    lactated ringers infusion    lactated ringers infusion    LIDOcaine (PF) 10 mg/ml (1%) injection 10 mg    lorazepam injection 0.25 mg    oxyCODONE immediate release tablet 5 mg    prochlorperazine injection Soln 5 mg    sodium chloride 0.9% flush 3 mL       This patient has been assessed for risk factors for clearance of surgery with the following stipulations:    ___ No contraindications  ___ Recommendations for antiplatelet/anticoagulant medications:  ___ Cleared  for surgery with the following contraindications/precautions:  ___ Not cleared for surgery due to the following reasons:      If you have any questions regarding the above, please contact my office at [phone].    Sincerely,

## 2024-02-12 DIAGNOSIS — M10.9 GOUT, UNSPECIFIED CAUSE, UNSPECIFIED CHRONICITY, UNSPECIFIED SITE: ICD-10-CM

## 2024-02-13 RX ORDER — ALLOPURINOL 300 MG/1
300 TABLET ORAL
Qty: 90 TABLET | Refills: 0 | Status: SHIPPED | OUTPATIENT
Start: 2024-02-13 | End: 2024-05-13

## 2024-02-13 NOTE — TELEPHONE ENCOUNTER
Care Due:                  Date            Visit Type   Department     Provider  --------------------------------------------------------------------------------                                EP -                              PRIMARY      [facility]  Last Visit: 10-      CARE ([facility])   MEDICINE       Luana Calvret                               -                              PRIMARY      [facility]  Next Visit: 03-      CARE ([facility])   [facility]       Luana Calvert                                                            Last  Test          Frequency    Reason                     Performed    Due Date  --------------------------------------------------------------------------------    Uric Acid...  12 months..  allopurinoL..............  Not Found    Overdue    [employer] Embedded Care Due Messages. Reference number: [id number].   2/12/2024 11:18:34 PM CST

## 2024-02-14 NOTE — TELEPHONE ENCOUNTER
Refill Routing Note   Medication(s) are not appropriate for processing by [facility] for the following reason(s):        Required labs outdated    [facility] action(s):  Defer     Requires labs : Yes             Appointments  past 12m or future 3m with PCP    Date Provider   Last Visit   10/20/2023 Luana Calvert MD   Next Visit   3/6/2024 Luana Calvert MD   ED visits in past 90 days: 0        Note composed:6:31 PM 02/13/2024

## 2024-02-14 NOTE — PROGRESS NOTES
Chart reviewed.   Immunizations: Triggered Imm Registry     Orders placed: n/a  Upcoming appts to satisfy RAFAEL topics: n/a       It was a pleasure to see you today. You presented to the clinic for constipation. You had a colonoscopy in 2018 which was negative, you are not due for a repeat colonoscopy until 2028. Plan:  Take Miralax daily and increase your fluid intake. Follow up appointments:  No follow-ups on file. Health Maintenance Due  Health Maintenance Due   Topic Date Due    Hepatitis B Vaccine (1 of 3 - 3-dose series) Never done    COVID-19 Vaccine (1) Never done    Influenza Vaccine (1) Never done    Medicare Advantage- Medicare Wellness Visit  01/01/2024       Further Instructions:  Please schedule a follow-up with us today before you leave. For any labs completed today, the results can be accessed through the patient portal on the [employer]. Please sign up! You may see your test results before I do. I will be contacting you regarding their interpretation. If you are unable to get labs completed today, the lab is open Monday through Friday from 8 am to 4:00 pm.  Call to schedule a lab visit. Please fast at least 8 hours prior to your lab draw if having cholesterol checked. If you require an imaging study and need to schedule an appointment, call central scheduling at [phone] to schedule all appointments. Please ask for the location address at the time of the phone call. If you require a referral to see a specialist, our staff will complete this on your behalf and provide you with the referral prior to you leaving.     Johana Barton,

## 2024-02-19 ENCOUNTER — TELEPHONE (OUTPATIENT)
Dept: PAIN MEDICINE | Facility: CLINIC | Age: 69
End: 2024-02-19
Payer: MEDICARE

## 2024-02-19 NOTE — TELEPHONE ENCOUNTER
Called to r/s appointment patient stated she is to have her stimulator removed on Wednesday by Dr. Carroll. Patient Cx appointment today and would like to know when you would like for the to r/s?

## 2024-02-20 ENCOUNTER — ANESTHESIA EVENT (OUTPATIENT)
Dept: SURGERY | Facility: HOSPITAL | Age: 69
End: 2024-02-20
Payer: MEDICARE

## 2024-02-21 ENCOUNTER — HOSPITAL ENCOUNTER (OUTPATIENT)
Facility: HOSPITAL | Age: 69
Discharge: HOME | End: 2024-02-21
Attending: ANESTHESIOLOGY | Admitting: ANESTHESIOLOGY
Payer: MEDICARE

## 2024-02-21 ENCOUNTER — ANESTHESIA (OUTPATIENT)
Dept: SURGERY | Facility: HOSPITAL | Age: 69
End: 2024-02-21
Payer: MEDICARE

## 2024-02-21 DIAGNOSIS — M54.16 CHRONIC LUMBAR RADICULOPATHY: ICD-10-CM

## 2024-02-21 DIAGNOSIS — E83.42 HYPOMAGNESEMIA: Primary | ICD-10-CM

## 2024-02-21 PROCEDURE — 63600175 PHARM REV CODE 636 W HCPCS: Performed by: ANESTHESIOLOGY

## 2024-02-21 PROCEDURE — 37000008 HC ANESTHESIA 1ST 15 MINUTES: Performed by: ANESTHESIOLOGY

## 2024-02-21 PROCEDURE — 25000003 PHARM REV CODE 250: Performed by: NURSE ANESTHETIST, CERTIFIED REGISTERED

## 2024-02-21 PROCEDURE — 63600175 PHARM REV CODE 636 W HCPCS: Mod: JZ,JG | Performed by: ANESTHESIOLOGY

## 2024-02-21 PROCEDURE — 63661 REMOVE SPINE ELTRD PERQ ARAY: CPT | Mod: ,,, | Performed by: ANESTHESIOLOGY

## 2024-02-21 PROCEDURE — 71000015 HC POSTOP RECOV 1ST HR: Performed by: ANESTHESIOLOGY

## 2024-02-21 PROCEDURE — 25000003 PHARM REV CODE 250: Performed by: ANESTHESIOLOGY

## 2024-02-21 PROCEDURE — 36000706: Performed by: ANESTHESIOLOGY

## 2024-02-21 PROCEDURE — 63600175 PHARM REV CODE 636 W HCPCS: Performed by: NURSE ANESTHETIST, CERTIFIED REGISTERED

## 2024-02-21 PROCEDURE — 36000707: Performed by: ANESTHESIOLOGY

## 2024-02-21 PROCEDURE — 71000016 HC POSTOP RECOV ADDL HR: Performed by: ANESTHESIOLOGY

## 2024-02-21 PROCEDURE — 71000033 HC RECOVERY, INTIAL HOUR: Performed by: ANESTHESIOLOGY

## 2024-02-21 PROCEDURE — 37000009 HC ANESTHESIA EA ADD 15 MINS: Performed by: ANESTHESIOLOGY

## 2024-02-21 RX ORDER — BUPIVACAINE HYDROCHLORIDE 2.5 MG/ML
INJECTION, SOLUTION EPIDURAL; INFILTRATION; INTRACAUDAL
Status: DISCONTINUED | OUTPATIENT
Start: 2024-02-21 | End: 2024-02-21 | Stop reason: HOSPADM

## 2024-02-21 RX ORDER — MORPHINE SULFATE 2 MG/ML
5 INJECTION, SOLUTION INTRAMUSCULAR; INTRAVENOUS ONCE
Status: COMPLETED | OUTPATIENT
Start: 2024-02-21 | End: 2024-02-21

## 2024-02-21 RX ORDER — MIDAZOLAM HYDROCHLORIDE 1 MG/ML
INJECTION INTRAMUSCULAR; INTRAVENOUS
Status: DISCONTINUED | OUTPATIENT
Start: 2024-02-21 | End: 2024-02-21

## 2024-02-21 RX ORDER — ACETAMINOPHEN 10 MG/ML
INJECTION, SOLUTION INTRAVENOUS
Status: DISCONTINUED | OUTPATIENT
Start: 2024-02-21 | End: 2024-02-21

## 2024-02-21 RX ORDER — FENTANYL CITRATE 50 UG/ML
25 INJECTION, SOLUTION INTRAMUSCULAR; INTRAVENOUS EVERY 5 MIN PRN
Status: DISCONTINUED | OUTPATIENT
Start: 2024-02-21 | End: 2024-02-21 | Stop reason: HOSPADM

## 2024-02-21 RX ORDER — LIDOCAINE HYDROCHLORIDE 10 MG/ML
1 INJECTION, SOLUTION EPIDURAL; INFILTRATION; INTRACAUDAL; PERINEURAL ONCE
Status: ACTIVE | OUTPATIENT
Start: 2024-02-21

## 2024-02-21 RX ORDER — OXYCODONE HYDROCHLORIDE 5 MG/1
5 TABLET ORAL ONCE AS NEEDED
Status: DISCONTINUED | OUTPATIENT
Start: 2024-02-21 | End: 2024-02-21 | Stop reason: HOSPADM

## 2024-02-21 RX ORDER — SODIUM CHLORIDE, SODIUM LACTATE, POTASSIUM CHLORIDE, CALCIUM CHLORIDE 600; 310; 30; 20 MG/100ML; MG/100ML; MG/100ML; MG/100ML
INJECTION, SOLUTION INTRAVENOUS CONTINUOUS
Status: ACTIVE | OUTPATIENT
Start: 2024-02-21

## 2024-02-21 RX ORDER — METOCLOPRAMIDE HYDROCHLORIDE 5 MG/ML
10 INJECTION INTRAMUSCULAR; INTRAVENOUS EVERY 10 MIN PRN
Status: DISCONTINUED | OUTPATIENT
Start: 2024-02-21 | End: 2024-02-21 | Stop reason: HOSPADM

## 2024-02-21 RX ORDER — FENTANYL CITRATE 50 UG/ML
INJECTION, SOLUTION INTRAMUSCULAR; INTRAVENOUS
Status: DISCONTINUED | OUTPATIENT
Start: 2024-02-21 | End: 2024-02-21

## 2024-02-21 RX ORDER — PROPOFOL 10 MG/ML
VIAL (ML) INTRAVENOUS CONTINUOUS PRN
Status: DISCONTINUED | OUTPATIENT
Start: 2024-02-21 | End: 2024-02-21

## 2024-02-21 RX ORDER — DEXAMETHASONE SODIUM PHOSPHATE 4 MG/ML
INJECTION, SOLUTION INTRA-ARTICULAR; INTRALESIONAL; INTRAMUSCULAR; INTRAVENOUS; SOFT TISSUE
Status: DISCONTINUED | OUTPATIENT
Start: 2024-02-21 | End: 2024-02-21

## 2024-02-21 RX ORDER — LIDOCAINE HYDROCHLORIDE 10 MG/ML
1 INJECTION, SOLUTION EPIDURAL; INFILTRATION; INTRACAUDAL; PERINEURAL ONCE
Status: DISCONTINUED | OUTPATIENT
Start: 2024-02-21 | End: 2024-02-21 | Stop reason: HOSPADM

## 2024-02-21 RX ORDER — ONDANSETRON HYDROCHLORIDE 2 MG/ML
INJECTION, SOLUTION INTRAVENOUS
Status: DISCONTINUED | OUTPATIENT
Start: 2024-02-21 | End: 2024-02-21

## 2024-02-21 RX ORDER — LIDOCAINE HYDROCHLORIDE 20 MG/ML
INJECTION INTRAVENOUS
Status: DISCONTINUED | OUTPATIENT
Start: 2024-02-21 | End: 2024-02-21

## 2024-02-21 RX ORDER — PROPOFOL 10 MG/ML
VIAL (ML) INTRAVENOUS
Status: DISCONTINUED | OUTPATIENT
Start: 2024-02-21 | End: 2024-02-21

## 2024-02-21 RX ORDER — SODIUM CHLORIDE, SODIUM LACTATE, POTASSIUM CHLORIDE, CALCIUM CHLORIDE 600; 310; 30; 20 MG/100ML; MG/100ML; MG/100ML; MG/100ML
INJECTION, SOLUTION INTRAVENOUS CONTINUOUS
Status: DISCONTINUED | OUTPATIENT
Start: 2024-02-21 | End: 2024-02-21 | Stop reason: HOSPADM

## 2024-02-21 RX ADMIN — MIDAZOLAM HYDROCHLORIDE 1 MG: 1 INJECTION INTRAMUSCULAR; INTRAVENOUS at 10:02

## 2024-02-21 RX ADMIN — LIDOCAINE HYDROCHLORIDE 50 MG: 20 INJECTION, SOLUTION INTRAVENOUS at 10:02

## 2024-02-21 RX ADMIN — MORPHINE SULFATE 5 MG: 2 INJECTION, SOLUTION INTRAMUSCULAR; INTRAVENOUS at 09:02

## 2024-02-21 RX ADMIN — FENTANYL CITRATE 25 MCG: 50 INJECTION, SOLUTION INTRAMUSCULAR; INTRAVENOUS at 11:02

## 2024-02-21 RX ADMIN — ACETAMINOPHEN 1000 MG: 10 INJECTION, SOLUTION INTRAVENOUS at 11:02

## 2024-02-21 RX ADMIN — CEFAZOLIN 2 G: 2 INJECTION, POWDER, FOR SOLUTION INTRAMUSCULAR; INTRAVENOUS at 10:02

## 2024-02-21 RX ADMIN — ONDANSETRON 8 MG: 2 INJECTION INTRAMUSCULAR; INTRAVENOUS at 11:02

## 2024-02-21 RX ADMIN — GLYCOPYRROLATE 0.2 MG: 0.2 INJECTION, SOLUTION INTRAMUSCULAR; INTRAVITREAL at 10:02

## 2024-02-21 RX ADMIN — PROPOFOL 10 MG: 10 INJECTION, EMULSION INTRAVENOUS at 11:02

## 2024-02-21 RX ADMIN — PROPOFOL 20 MG: 10 INJECTION, EMULSION INTRAVENOUS at 10:02

## 2024-02-21 RX ADMIN — FENTANYL CITRATE 25 MCG: 50 INJECTION, SOLUTION INTRAMUSCULAR; INTRAVENOUS at 10:02

## 2024-02-21 RX ADMIN — PROPOFOL 50 MCG/KG/MIN: 10 INJECTION, EMULSION INTRAVENOUS at 10:02

## 2024-02-21 RX ADMIN — SODIUM CHLORIDE, SODIUM GLUCONATE, SODIUM ACETATE, POTASSIUM CHLORIDE, MAGNESIUM CHLORIDE, SODIUM PHOSPHATE, DIBASIC, AND POTASSIUM PHOSPHATE: .53; .5; .37; .037; .03; .012; .00082 INJECTION, SOLUTION INTRAVENOUS at 08:02

## 2024-02-21 RX ADMIN — DEXAMETHASONE SODIUM PHOSPHATE 4 MG: 4 INJECTION, SOLUTION INTRA-ARTICULAR; INTRALESIONAL; INTRAMUSCULAR; INTRAVENOUS; SOFT TISSUE at 11:02

## 2024-02-21 NOTE — PLAN OF CARE
Patient ready for surgery. Surgery and anesthesia consents signed. Educated on incentive spirometer use. Belongings in pre-op locker. Friend Ousmane set up with text message notifications.

## 2024-02-21 NOTE — PLAN OF CARE
Sites x2, gauze and tegaderm noted. Dressings clean, dry, and intact. Per Dr. Carroll, pt to leave dressings on--keep it clean, dry, and intact until post-op appointment, and OK for pt to restart her plavix tomorrow. Educated pt and pt's friend. Pt and pt's friend verbalized understanding.

## 2024-02-21 NOTE — DISCHARGE SUMMARY
[facility]  [facility]charge [facility]  Short Stay    Procedure(s) (LRB):  REMOVAL, NEUROSTIMULATOR, SPINAL (N/A)      OUTCOME: Patient tolerated treatment/procedure well without complication and is now ready for discharge.    DISPOSITION: Home or Self Care    FINAL DIAGNOSIS:  <principal problem not specified>    FOLLOWUP: In clinic    DISCHARGE INSTRUCTIONS:    Discharge Procedure Orders   Notify your health care provider if you experience any of the following:  temperature >100.4     Notify your health care provider if you experience any of the following:  severe uncontrolled pain     Notify your health care provider if you experience any of the following:  redness, tenderness, or signs of infection (pain, swelling, redness, odor or green/yellow discharge around incision site)     Activity as tolerated        TIME SPENT ON DISCHARGE: 30 minutes

## 2024-02-21 NOTE — ANESTHESIA PREPROCEDURE EVALUATION
02/21/2024  Alessia Nelson is a 68 y.o., female.      Pre-op Assessment    I have reviewed the Patient Summary Reports.     I have reviewed the Nursing Notes. I have reviewed the NPO Status.   I have reviewed the Medications.     Review of Systems  Anesthesia Hx:  No problems with previous Anesthesia                Social:  Former Smoker       Cardiovascular:     Hypertension, well controlled   CAD  asymptomatic     CHF    hyperlipidemia                             Pulmonary:   COPD, mild                     Renal/:  Chronic Renal Disease (stage 3b), CKD                Hepatic/GI:     GERD, well controlled             Musculoskeletal:  Arthritis          Spine Disorders: cervical and lumbar            Neurological:  TIA, CVA Neuromuscular Disease,  Headaches Seizures, well controlled          Peripheral Neuropathy                          Endocrine:   Hypothyroidism          Psych:  Psychiatric History anxiety               Physical Exam  General: Well nourished, Cooperative, Alert and Oriented    Airway:  Mallampati: II   Mouth Opening: Normal  TM Distance: Normal  Neck ROM: Normal ROM    Anesthesia Plan  Type of Anesthesia, risks & benefits discussed:    Anesthesia Type: Gen ETT, Gen Supraglottic Airway, Gen Natural Airway, MAC  Intra-op Monitoring Plan: Standard ASA Monitors  Post Op Pain Control Plan: multimodal analgesia  Induction:  IV  Airway Plan: Direct, Video and Fiberoptic, Post-Induction  Informed Consent: Informed consent signed with the Patient and all parties understand the risks and agree with anesthesia plan.  All questions answered.   ASA Score: 3    Ready For Surgery From Anesthesia Perspective.   .

## 2024-02-21 NOTE — TRANSFER OF CARE
Anesthesia Transfer of Care Note    Patient: Alessia Nelson    Procedure(s) Performed: Procedure(s) (LRB):  REMOVAL, NEUROSTIMULATOR, SPINAL (N/A)    Patient location: PACU    Anesthesia Type: MAC    Transport from OR: Transported from OR on 2-3 L/min O2 by NC with adequate spontaneous ventilation    Post pain: adequate analgesia    Post assessment: no apparent anesthetic complications    Post vital signs: stable    Level of consciousness: awake    Nausea/Vomiting: no nausea/vomiting    Complications: none    Transfer of care protocol was followed      Last vitals: Visit Vitals  BP (!) 172/74 (BP Location: Left arm, Patient Position: Lying)   Pulse (!) 55   Temp 36.3 °C (97.4 °F) (Skin)   Resp 16   Wt 87.5 kg (193 lb)   SpO2 100%   Breastfeeding No   BMI 30.23 kg/m²

## 2024-02-21 NOTE — OP NOTE
Date of Procedure: 2/21/2024     Spinal Cord Stimulator Leads  Removal  PROCEDURE:   1. Spinal Cord Stimulator leads removal x2, IPG removal    Pre-op diagnosis: Chronic pain, pain at SCS lead site    Post-op diagnosis: Same    Surgeon: Maurilio Carroll MD     Assistant: Jerry Ellis MD ([id number])     Anesthesia: Monitored Anesthesia Care    Estimated Blood Loss: Minimal-    Urine Output: Not Measured    IV Fluids- See Anesthesia record    Complications: None    CONSENT: The risks, benefits, and options were discussed thoroughly with the patient. The patient's questions were answered. The patient understands the risk and benefits and wishes to proceed. Informed consent was obtained.     PROCEDURE NOTE:  After obtaining informed consent, pre-op antibiotic was started 30 minutes prior to incision. The patient was taken to the OR and placed in the prone position. The anesthesia provider throughout the case provided sedation and cardiopulmonary monitoring. The Patient's back was prepped with Chloraprep and then draped in usual sterile fashion.     An AP fluoroscopic view was obtained to identify and samantha the anchor site in midline thoracic region. The skin was anesthetized with Lidocaine 1% with epinephrine. Skin incision with a No. 10 scalpel was made and local dissection done as necessary to facilitate access to the paraspinous fascia and anchoring site. Once anchor sites were localized, sutures cut that were holding the leads in place, leads pulled from thoracic epidural space without resistance.  Copious antibiotic bulb lavage was irrigated throughout the field, and hemostasis was maintained.     Then the wound was closed with simple interrupted sutures using 2-0 Vicryl sutures and then skin closed with 4-0 monocyrl sutures. Bacitracin was placed over the incision site and dressings applied. Sponge and needle counts were correct x2 at conclusion of the case.     Patient was then placed supine on the stretcher and transferred to  the recovery room.The patient has been scheduled to return to the clinic in approx 7 days. The patient tolerated the procedure well.

## 2024-02-21 NOTE — ANESTHESIA POSTPROCEDURE EVALUATION
Anesthesia Post Evaluation    Patient: Alessia Nelson    Procedure(s) Performed: Procedure(s) (LRB):  REMOVAL, NEUROSTIMULATOR, SPINAL (N/A)    Final Anesthesia Type: MAC      Patient location during evaluation: PACU  Patient participation: Yes- Able to Participate  Level of consciousness: awake and alert and oriented  Post-procedure vital signs: reviewed and stable  Pain management: adequate  Airway patency: patent    PONV status at discharge: No PONV  Anesthetic complications: no      Cardiovascular status: blood pressure returned to baseline and stable  Respiratory status: unassisted and spontaneous ventilation  Hydration status: euvolemic  Follow-up not needed.              Vitals Value Taken Time   /53 02/21/24 1214   Temp 36.5 °C (97.7 °F) 02/21/24 1155   Pulse 58 02/21/24 1216   Resp 10 02/21/24 1216   SpO2 100 % 02/21/24 1216   Vitals shown include unvalidated device data.      No case tracking events are documented in the log.      Pain/Radha Score: Pain Rating Prior to Med Admin: 7 (2/21/2024  9:46 AM)  Radha Score: 9 (2/21/2024 12:05 PM)

## 2024-02-21 NOTE — PLAN OF CARE
"Pt awake, alert, oriented. Vital signs stable. Pt reports she is "ready to go". Discharge instructions reviewed with pt and pt's friend. Pt and pt's friend verbalized understanding. IV removed, catheter intact. Dressings to back clean, dry, and intact. All belongings returned to patient. Pt transferred to friend's car via wheelchair. Safety maintained.   "

## 2024-02-21 NOTE — DISCHARGE INSTRUCTIONS
Post-Procedure instructions: These instructions are for general purpose only. For specific Post-Procedure instructions, please follow the written instructions given to you following the procedure.  If you received sedation during the procedure, do not drive, operate machinery or make any important decisions 24-48 hours following surgery.  If you have stopped taking Aspirin prior to the procedure, please restart taking it from the day after your procedure. You may restart your Plavix or Coumadin the day after your procedure unless otherwise instructed.  Do not soak in the tub or any body of water until cleared by MD.  Leave dressing on--keep it clean, dry, and intact until post-op appointment   Avoid bending, squatting, or reaching above the shoulders 6-8 weeks following surgery. Limit lifting to less than 5-8 lbs.     Please seek medical help immediately if any of the following symptoms occur:  1. Severe increase in your usual pain or appearance of new pain. 2. Prolonged or increasing weakness or numbness in the legs or arms. 3. Drainage, redness, active bleeding, or increased swelling at the injection site. 4. Temperature over 100.0 degrees F. 5. Headache that increases when your head is upright and decreases when you lie flat. 6. Shortness of breath, chest pain, or problems breathing. DO NOT LEAVE A MESSAGE ON THE PROCEDURE PHONE LINE    Please contact [place] or [place] [phone] if your physical condition changes prior to your procedure, if you should need to delay or cancel your procedure or if you have any questions about your procedure or these instructions          Discharge Instructions: After Your Surgery/Procedure  Youve just had surgery. During surgery you were given medicine called anesthesia to keep you relaxed and free of pain. After surgery you may have some pain or nausea. This is common. Here are some tips for feeling better and getting well after surgery.     Stay on schedule with your medication.  "  Going home  Your doctor or nurse will show you how to take care of yourself when you go home. He or she will also answer your questions. Have an adult family member or friend drive you home.      For your safety we recommend these precaution for the first 24 hours after your procedure:  Do not drive or use heavy equipment.  Do not make important decisions or sign legal papers.  Do not drink alcohol.  Have someone stay with you, if needed. He or she can watch for problems and help keep you safe.  Your concentration, balance, coordination, and judgement may be impaired for many hours after anesthesia.  Use caution when ambulating or standing up.     You may feel weak and "washed out" after anesthesia and surgery.      Subtle residual effects of general anesthesia or sedation with regional / local anesthesia can last more than 24 hours.  Rest for the remainder of the day or longer if your Doctor/Surgeon has advised you to do so.  Although you may feel normal within the first 24 hours, your reflexes and mental ability may be impaired without you realizing it.  You may feel dizzy, lightheaded or sleepy for 24 hours or longer.      Be sure to go to all follow-up visits with your doctor. And rest after your surgery for as long as your doctor tells you to.  Coping with pain  If you have pain after surgery, pain medicine will help you feel better. Take it as told, before pain becomes severe. Also, ask your doctor or pharmacist about other ways to control pain. This might be with heat, ice, or relaxation. And follow any other instructions your surgeon or nurse gives you.  Tips for taking pain medicine  To get the best relief possible, remember these points:  Pain medicines can upset your stomach. Taking them with a little food may help.  Most pain relievers taken by mouth need at least 20 to 30 minutes to start to work.  Taking medicine on a schedule can help you remember to take it. Try to time your medicine so that you " can take it before starting an activity. This might be before you get dressed, go for a walk, or sit down for dinner.  Constipation is a common side effect of pain medicines. Call your doctor before taking any medicines such as laxatives or stool softeners to help ease constipation. Also ask if you should skip any foods. Drinking lots of fluids and eating foods such as fruits and vegetables that are high in fiber can also help. Remember, do not take laxatives unless your surgeon has prescribed them.  Drinking alcohol and taking pain medicine can cause dizziness and slow your breathing. It can even be deadly. Do not drink alcohol while taking pain medicine.  Pain medicine can make you react more slowly to things. Do not drive or run machinery while taking pain medicine.  Your health care provider may tell you to take acetaminophen to help ease your pain. Ask him or her how much you are supposed to take each day. Acetaminophen or other pain relievers may interact with your prescription medicines or other over-the-counter (OTC) drugs. Some prescription medicines have acetaminophen and other ingredients. Using both prescription and OTC acetaminophen for pain can cause you to overdose. Read the labels on your OTC medicines with care. This will help you to clearly know the list of ingredients, how much to take, and any warnings. It may also help you not take too much acetaminophen. If you have questions or do not understand the information, ask your pharmacist or health care provider to explain it to you before you take the OTC medicine.  Managing nausea  Some people have an upset stomach after surgery. This is often because of anesthesia, pain, or pain medicine, or the stress of surgery. These tips will help you handle nausea and eat healthy foods as you get better. If you were on a special food plan before surgery, ask your doctor if you should follow it while you get better. These tips may help:  Do not push yourself  to eat. Your body will tell you when to eat and how much.  Start off with clear liquids and soup. They are easier to digest.  Next try semi-solid foods, such as mashed potatoes, applesauce, and gelatin, as you feel ready.  Slowly move to solid foods. Dont eat fatty, rich, or spicy foods at first.  Do not force yourself to have 3 large meals a day. Instead eat smaller amounts more often.  Take pain medicines with a small amount of solid food, such as crackers or toast, to avoid nausea.     Call your surgeon if  You still have pain an hour after taking medicine. The medicine may not be strong enough.  You feel too sleepy, dizzy, or groggy. The medicine may be too strong.  You have side effects like nausea, vomiting, or skin changes, such as rash, itching, or hives.       If you have obstructive sleep apnea  You were given anesthesia medicine during surgery to keep you comfortable and free of pain. After surgery, you may have more apnea spells because of this medicine and other medicines you were given. The spells may last longer than usual.   At home:  Keep using the continuous positive airway pressure (CPAP) device when you sleep. Unless your health care provider tells you not to, use it when you sleep, day or night. CPAP is a common device used to treat obstructive sleep apnea.  Talk with your provider before taking any pain medicine, muscle relaxants, or sedatives. Your provider will tell you about the possible dangers of taking these medicines.  © 2299-8988 [employer] [address] [place] [address], [place] [address]. All rights reserved. This information is not intended as a substitute for professional medical care. Always follow your healthcare professional's instructions.          Using an Incentive Spirometer    An incentive spirometer is a device that helps you do deep breathing exercises. These exercises expand your lungs, aid in circulation, and help prevent pneumonia. Deep breathing exercises  also help you breathe better and improve the function of your lungs by:  Keeping your lungs clear  Strengthening your breathing muscles  Helping prevent respiratory complications or problems  The incentive spirometer gives you a way to take an active part in recover. A nurse or therapist will teach you breathing exercises. To do these exercises, you will breathe in through your mouth and not your nose. The incentive spirometer only works correctly if you breathe in through your mouth.  Steps to clear lungs  Step 1. Exhale normally. Then, inhale normally.  Relax and breathe out.  Step 2. Place your lips tightly around the mouthpiece.  Make sure the device is upright and not tilted.  Step 3. Inhale as much air as you can through the mouthpiece (don't breath through your nose).  Inhale slowly and deeply.  Hold your breath long enough to keep the balls or disk raised for at least 3 to 5 seconds, or as instructed by your healthcare provider.  Some spirometers have an indicator to let you know that you are breathing in too fast. If the indicator goes off, breathe in more slowly.  Step 4. Repeat the exercise regularly.  Do this exercise every hour while you're awake, or as instructed by your healthcare provider.  If you were taught deep breathing and coughing exercises, do them regularly as instructed by your healthcare provider.           Post op instructions for prevention of DVT  What is deep vein thrombosis?  Deep vein thrombosis (DVT) is the medical term for blood clots in the deep veins of the leg.  These blood clots can be dangerous.  A DVT can block a blood vessel and keep blood from getting where it needs to go.  Another problem is that the clot can travel to other parts of the body such as the lungs.  A clot that travels to the lungs is called a pulmonary embolus (PE) and can cause serious problems with breathing which can lead to death.  Am I at risk for DVT/PE?  If you are not very active, you are at risk of  DVT.  Anyone confined to bed, sitting for long periods of time, recovering from surgery, etc. increases the risk of DVT.  Other risk factors are cancer diagnosis, certain medications, estrogen replacement in any form,older age, obesity, pregnancy, smoking, history of clotting disorders, and dehydration.  How will I know if I have a DVT?  Swelling in the lower leg  Pain  Warmth, redness, hardness or bulging of the vein  If you have any of these symptoms, call your doctors office right away.  Some people will not have any symptoms until the clot moves to the lungs.  What are the symptoms of a PE?  Panting, shortness of breath, or trouble breathing  Sharp, knife-like chest pain when you breathe  Coughing or coughing up blood  Rapid heartbeat  If you have any of these symptoms or get worse quickly, call 911 for emergency treatment.  How can I prevent a DVT?  Avoid long periods of inactivity and dont cross your legs--get up and walk around every hour or so.  Stay active--walking after surgery is highly encouraged.  This means you should get out of the house and walk in the neighborhood.  Going up and down stairs will not impair healing (unless advised against such activity by your doctor).    Drink plenty of noncaffeinated, nonalcoholic fluids each day to prevent dehydration.  Wear special support stockings, if they have been advised by your doctor.  If you travel, stop at least once an hour and walk around.  Avoid smoking (assistance with stopping is available through your healthcare provider)  Always notify your doctor if you are not able to follow the post operative instructions that are given to you at the time of discharge.  It may be necessary to prescribe one of the medications available to prevent DVT.          We hope your stay was comfortable as you heal now, mend and rest.    For we have enjoyed taking care of you by giving your our best.    And as you get better, by regaining your health and strength;   We  count it as a privilege to have served you and hope your time at [facility] was well spent.      Thank  You!!!

## 2024-02-22 VITALS
OXYGEN SATURATION: 95 % | BODY MASS INDEX: 30.23 KG/M2 | HEART RATE: 61 BPM | SYSTOLIC BLOOD PRESSURE: 125 MMHG | DIASTOLIC BLOOD PRESSURE: 58 MMHG | TEMPERATURE: 98 F | RESPIRATION RATE: 15 BRPM | WEIGHT: 193 LBS

## 2024-02-22 NOTE — PROGRESS NOTES
2/22/24 Very pleased with care given.  States all staff were kind and supportive.  Has read and understands all discharge instructions.

## 2024-03-04 ENCOUNTER — OFFICE VISIT (OUTPATIENT)
Dept: PAIN MEDICINE | Facility: CLINIC | Age: 69
End: 2024-03-04
Payer: MEDICARE

## 2024-03-04 VITALS — WEIGHT: 193 LBS | RESPIRATION RATE: 18 BRPM | BODY MASS INDEX: 30.29 KG/M2 | HEART RATE: 54 BPM | HEIGHT: 67 IN

## 2024-03-04 DIAGNOSIS — T85.192D FAILURE OF SPINAL CORD STIMULATOR, SUBSEQUENT ENCOUNTER: ICD-10-CM

## 2024-03-04 DIAGNOSIS — M96.1 POSTLAMINECTOMY SYNDROME OF LUMBAR REGION: Primary | ICD-10-CM

## 2024-03-04 PROCEDURE — 99212 OFFICE O/P EST SF 10 MIN: CPT | Mod: PBBFAC

## 2024-03-04 PROCEDURE — 99024 POSTOP FOLLOW-UP VISIT: CPT | Mod: POP,,,

## 2024-03-04 PROCEDURE — 99999 PR PBB SHADOW E&M-EST. PATIENT-LVL II: CPT | Mod: PBBFAC,,,

## 2024-03-04 NOTE — PROGRESS NOTES
FOLLOW UP NOTE:     CHIEF COMPLAINT: neck and low back pain    INTERVAL HISTORY: Alessia Nelson is a 68 y.o. female with PMH significant for hx of cervical spine surgery, hx of lumbar spine surgery X 3, hx of peritonitis requiring bowel resection, and torticollis (per patient report) presents as an established patient for the continued management of back, hip, and neck pain.  The patient presents today for removal of staples s/p SCS explant. The SCS was explanted on 2/21/2024.   The patient denies of any significant changes in her health since her last appointment. The patient also denies of any changes in the character of her pain since her last appointment. The patient reports that her current pain is a 8/10. Patient denies of any urinary/fecal incontinence, saddle anesthesia, or weakness.     PRIOR HPI: Alessia Nelson is a 65 y.o. female with PMH significant for hx of cervical spine surgery, hx of lumbar spine surgery X 3, hx of peritonitis requiring bowel resection, and torticollis (per patient report) presents as an established patient of Dr. Aragon (new to me) for the evaluation of low back and neck pain. The patient reports that she has been in pain for over twenty years. She reports that she had her neck surgery in 2006/2007 in [place]. She reports of benefit in regards to her neck pain. The patient reports of her initial lumbar spine surgery in 2007/2008 in [place] as well. She reports of improvement after her initial surgery. She reports that she had a second lumbar spine surgery in 2008/2009 as her pain recurred. She reports of benefit with this surgery as well. She reports of a third lumbar spine surgery in 2012. She reports of doing well for a couple years until she fell out of her bed. The patient reports that her pain has been worsening ever since then. Low back pain > neck pain. The patient localizes her pain to the area across her lower back.  The patient reports of radiation down her RLE to her  knee. The patient reports of numbness in her feet and hands bilaterally. The patient describes her pain as a sharp and aching type of pain. The patient reports that her pain is worsened with any activity that requires walking and activities such as doing laundry. The patient reports of benefit with pain medication. The patient reports that her current pain is a 6/10. Patient denies of any urinary/fecal incontinence, saddle anesthesia, or weakness.      In regards to her neck pain, the patient localizes her pain to the middle/right side of her neck. The patient reports of radiation down her RUE to her elbow. The patient reports that her current pain in her neck is a 6/10. Of note, the patient reports that she would be willing to undergo surgery if she has to. I have updated the patient's current pain regimen below.         INTERVENTIONAL PAIN HISTORY:  11/30/2023: Cervical Jessica at C7-T1- no relief.   12/8/2021: C7-T1 cervical interlaminar epidural steroid injection - no relief  9/22/2021:  Right sacroiliac joint injection - significant relief  6/18/2021: Insertion of SCS (Nevro)   6/7/2021: SCS Trial - 70 - 80% benefit  3/1/2021: Caudal epidural steroid injection - no benefit   6/10/2019: L5-S3 Right Medial Branch Nerve Coolief thermal Radiofrequency Ablation via Dr. Aragon - no benefit per chart review     CURRENT PAIN MEDICATIONS:   Percocet  mg PO TID  OTC Tylenol   Gabapentin 400 mg PO BID  Baclofen 10 mg PO BID/TID  No longer taking:     tizanidine causes dry mouth.           ROS:  Review of Systems   Constitutional:  Negative for chills and fever.   HENT:  Negative for sore throat.    Eyes:  Negative for visual disturbance.   Respiratory:  Negative for shortness of breath.    Cardiovascular:  Negative for chest pain.   Gastrointestinal:  Negative for nausea and vomiting.   Genitourinary:  Negative for difficulty urinating.   Musculoskeletal:  Positive for arthralgias, back pain, gait problem and neck pain.  "  Skin:  Negative for rash.   Allergic/Immunologic: Negative for immunocompromised state.   Neurological:  Positive for numbness. Negative for syncope.   Hematological:  Does not bruise/bleed easily.   Psychiatric/Behavioral:  Negative for suicidal ideas.    All other systems reviewed and are negative.       MEDICAL, SURGICAL, FAMILY, SOCIAL HX: reviewed    MEDICATIONS/ALLERGIES: reviewed    PHYSICAL EXAM:    VITALS: Vitals reviewed.   Vitals:    03/04/24 1057   Pulse: (!) 54   Resp: 18   Weight: 87.5 kg (193 lb)   Height: 5' 7" (1.702 m)   PainSc:   9              UPPER EXTREMITIES: Normal alignment, normal range of motion, no atrophy, no skin changes,  hair growth and nail growth normal and equal bilaterally. No swelling, no tenderness.    LOWER EXTREMITIES:  Normal alignment, normal range of motion, no atrophy, no skin changes,  hair growth and nail growth normal and equal bilaterally. No swelling, no tenderness.     CERVICAL SPINE:  Cervical spine: ROM is full in flexion, extension and lateral rotation with increased pain with passive ROM and lateral rotation.   Spurling's maneuver - deferred given prior fusion  Myofascial exam: Tenderness to palpation across cervical paraspinous region bilaterally. Prior anterior scar is well-healed.      LUMBAR SPINE:    Lumbar spine: ROM is limited with flexion extension and oblique extension with increased pain to any maneuvar.    ((+)) Supine straight leg raise R > L  ((+)) Facet loading bilateral  Internal and external rotation of the hip causes no increased pain on either side.  Myofascial exam: Tenderness to palpation across lumbar paraspinous muscles.     ((+)) TTP at the SI joint bilaterally  ((+)) MIKEY's test  ((+)) One leg stand    ((+)) Distraction test    MOTOR: Tone and bulk: normal bilateral upper and lower Strength: normal    SENSATION: Light touch and pinprick intact bilaterally  REFLEXES: normal, symmetric, nonbrisk.  Toes down, no clonus. Negative hilliard's " "sign bilaterally.  GAIT: normal rise, base, steps, and arm swing.      IMAGING: EMG - 8/11/2022 (Completed at [facility])     " IMPRESSION:   This is an abnormal study.  No electrodiagnostic evidence of neuropathy of left upper extremity of nerves tested or left cervical radiculopathy at this time.  Electrodiagnostic evidence of chronic bilateral lower extremity polyneuropathy in areas tested. Sensory, motor, demyelinating, axonal. Hx of hypothyroidism.   No electrodiagnostic evidence of acute bilateral lumbosacral radiculopathy at this time.     ASSESSMENT:   Patient presents for staple removal. Site clean, dry and intact.   1. Postlaminectomy syndrome of lumbar region    2. Failure of spinal cord stimulator, subsequent encounter                      PLAN:  I have stressed the importance of physical activity and exercise to improve overall health  Reviewed pertinent imaging and records with patient  1.  After cleaning with chlor prep, I removed the staples and applied Steri-Strips over the incision sites.  I also asked her to keep this on clean and dry for the next 5 days.  After that point she can remove the bandages and begin showering but not scrub the area.  If she has any worsening pain, drainage or any other issues I've asked her to contact me.    Continue Norco  mg PO q 8 hr PRN for breakthrough pain; # 90 tablets; 2 refills.  reviewed without discrepancies.    F/U 2 weeks or sooner if needed  Laura Heath, ANUM                         "

## 2024-03-07 NOTE — PROGRESS NOTES
Patient ID:  Alessia Nelson  68 y.o.  female      Assessment:       1. Pre-op evaluation    2. Atherosclerosis of native coronary artery of native heart without angina pectoris    3. Primary hypertension    4. Sinus Bradycardia        Plan:     The predominant rhythm was sinus on the ZIO patch.  Minimum heart rate was 42 BPM at nighttime with no associated symptoms.  Average heart rate of 58 beats per minute. Had short runs of atrial tachycardia on the monitor.  Continue metoprolol at this time.  Decrease dose if bradycardia worsens in the future.  Continue Plavix 75 mg daily and Crestor 10 mg daily.  Continue Lasix.  She is at moderate risk for leslye-procedure cardiac adverse events.  No further diagnostic/therapeutic measures needed at this time from cardiac standpoint prior to procedure.  May hold Plavix 5-7 days prior to the procedure.  Counseled on heart healthy lifestyle and ASCVD risk factor control.     Subjective:     Chief Complaint   Patient presents with    Results       HPI:  Alessia Nelson is a 68 y.o. female who presents for follow-up and discussed test results.  She has history of coronary artery disease status post PCI in 2017, hypertension, CKD and hyperlipidemia.  At our last visit she reported that she had noticed her heart rate being on the lower side at times.  It ZIO patch was obtained for further evaluation.  It showed predominant sinus rhythm with minimum heart rate of 42 beats per minute and average heart rate of 58 beats per minute.  She had several short runs of atrial tachycardia.  She denies any dizziness, syncope or near-syncope.  She has occasional mild shortness of breath with exertion that is chronic with no recent worsening.  Denies any chest pain with exertion.  Denies any shortness of breath at rest, chest pain or palpitations.  She has a neurostimulator for back pain.  She is having it explanted with Dr. Carroll at the end of February.      PREVIOUS CARDIAC TESTING/PROCEDURES  HISTORY:  Most Recent Echocardiogram Results  Results for orders placed in visit on 09/23/20    Echo Color Flow Doppler? Yes; Bubble Contrast? No    Interpretation Summary  · There is left ventricular concentric remodeling.  · The left ventricle is normal in size with normal systolic function. The estimated ejection fraction is 63%.  · Normal left ventricular diastolic function.  · Normal right ventricular systolic function.  · No pulmonary hypertension  · Normal central venous pressure (3 mmHg).  · The estimated PA systolic pressure is 17 mmHg.      Most Recent Stress Test Results  Results for orders placed during the hospital encounter of 09/28/20    Nuclear Stress - Cardiology Interpreted    Interpretation Summary    The study shows normal myocardial perfusion.    The perfusion scan is free of evidence from myocardial ischemia or injury.    Gated perfusion images showed an ejection fraction of % at rest and 84% post stress. Normal ejection fraction is greater than %.    The EKG portion of this study is negative for ischemia.    The patient reported no chest pain during the stress test.    There were no arrhythmias during stress.          30 Day Outpatient Telemetry    Narrative    Predominant underlying rhythm was Sinus Rhythm.  Minimum sinus rate of 42 beats per minute, maximum sinus rate of 123 beats per minute and average of 58 beats per minute.    Overall min HR of 42 bpm, max HR of 136 bpm, and avg HR of 58 bpm.    Multiple episodes of narrow-complex supraventricular tachycardia runs occurred, the run with the fastest interval lasting 7 beats with a max rate of 136 bpm, the longest lasting 19 beats with an avg rate of 107 bpm.    Isolated SVEs were rare (<1.0%, 703), SVE Couplets were rare (<1.0%, 211), and SVE Triplets were rare (<1.0%, 209).    Isolated VEs were rare (<1.0%, 1), and no VE Couplets or VE Triplets were present.    There were no patient reported events.    There were 0 auto-triggered  "events.    See full report      CARDIAC SURGERY:    Most Recent EKG Results  Results for orders placed or performed during the hospital encounter of 01/02/24   SCHEDULED EKG 12-LEAD (to Muse)    Collection Time: 01/02/24  9:19 AM    Narrative    Test Reason : Z01.811,    Vent. Rate : 064 BPM     Atrial Rate : 064 BPM     P-R Int : 162 ms          QRS Dur : 092 ms      QT Int : 422 ms       P-R-T Axes : 077 050 076 degrees     QTc Int : 435 ms    Normal sinus rhythm  Cannot rule out Anterior infarct ,age undetermined  Abnormal ECG  When compared with ECG of 15-NOV-2023 11:26,  No significant change was found  Confirmed by Jovanni Serrano MD ([id number]) on 1/2/2024 3:48:24 PM    Referred By: MODESTO GALAVIZ           Confirmed By:Jovanni Serrano MD       The 10-year ASCVD risk score (Fletcher ALVARADO, et al., 2019) is: 9.1%    Values used to calculate the score:      Age: 68 years      Sex: Female      Is Non- : No      Diabetic: No      Tobacco smoker: No      Systolic Blood Pressure: 125 mmHg      Is BP treated: Yes      HDL Cholesterol: 51 mg/dL      Total Cholesterol: 204 mg/dL    Review of patient's allergies indicates:   Allergen Reactions    Aspirin Other (See Comments)     "Makes stomach feel like it's on fire"    Phenergan [promethazine] Other (See Comments)     SEIZURES    Lortab [hydrocodone-acetaminophen] Itching     Able to take generic Norco       Past Medical History:   Diagnosis Date    Acute pancreatitis     Back pain     CAD (coronary artery disease)     CHF (congestive heart failure)     COPD (chronic obstructive pulmonary disease)     Depression     Diverticulosis     Encounter for blood transfusion     GERD (gastroesophageal reflux disease)     History of blood clots     1986 AFTER BOWEL RESCETION    History of bowel resection     Hyperlipidemia     Hypertension     Peritonitis     Seizures     HAD A SEIZURE FROM PHENERGAN     Stroke     Thyroid disease     TIA (transient ischemic attack)     Wears " dentures     upper     Past Surgical History:   Procedure Laterality Date    ADRENAL GLAND SURGERY      APPENDECTOMY      BACK SURGERY      CAUDAL EPIDURAL STEROID INJECTION N/A 3/1/2021    Procedure: Injection-steroid-epidural-caudal;  Surgeon: Socorro Lorenz MD;  Location: [facility] OR;  Service: Pain Management;  Laterality: N/A;    CHOLECYSTECTOMY      COLONOSCOPY      COLONOSCOPY N/A 6/10/2021    Procedure: COLONOSCOPY;  Surgeon: Sheree Metz MD;  Location: [facility] ENDO;  Service: Endoscopy;  Laterality: N/A;    COLONOSCOPY N/A 3/8/2022    Procedure: COLONOSCOPY;  Surgeon: Maurilio Rodriguez MD;  Location: [facility] (2ND FLR);  Service: Endoscopy;  Laterality: N/A;  Pt to perform at-home COVID test morning of procedure-DS  2/24-Blood thinner approval rec'[facility] Dr. Walsh (see letters tab 2/24/22)-DS  3/2-Instructions sent via email-DS  3/7-Pt unable to come earlier due to ride-DS    CORONARY STENT PLACEMENT      CYSTOURETHROSCOPY N/A 11/13/2019    Procedure: CYSTOURETHROSCOPY;  Surgeon: Shun Nuñez MD;  Location: [facility] OR;  Service: Urology;  Laterality: N/A;    DIRECT DIAGNOSTIC LARYNGOSCOPY WITH BRONCHOSCOPY AND ESOPHAGOSCOPY N/A 1/3/2024    Procedure: LARYNGOSCOPY, DIRECT, DIAGNOSTIC, WITH BRONCHOSCOPY AND ESOPHAGOSCOPY;  Surgeon: Mir Belle MD;  Location: [facility] OR;  Service: ENT;  Laterality: N/A;    ENDOSCOPIC ULTRASOUND OF LOWER GASTROINTESTINAL TRACT N/A 3/8/2022    Procedure: ULTRASOUND, LOWER GI TRACT, ENDOSCOPIC;  Surgeon: Maurilio Rodriguez MD;  Location: [facility] ([address] [place] [address]);  Service: Endoscopy;  Laterality: N/A;  Pt to perform at-home COVID test morning of procedure-DS  2/24-Blood thinner approval rec'[facility] Dr. Walsh (see letters tab 2/24/22)-DS  3/2-Instructions sent via email-DS  3/7-Pt unable to come earlier due to ride-DS    ENDOSCOPIC ULTRASOUND OF UPPER GASTROINTESTINAL TRACT N/A 11/25/2019    Procedure: ULTRASOUND, UPPER GI TRACT, ENDOSCOPIC;  Surgeon: Alhaji Bridges MD;  Location:  [facility] ENDO (2ND FLR);  Service: Endoscopy;  Laterality: N/A;  5 day hold Plavix, Dr Jovanni Serrano - pg  PM prep    ENDOSCOPIC ULTRASOUND OF UPPER GASTROINTESTINAL TRACT N/A 11/2/2020    Procedure: ULTRASOUND, UPPER GI TRACT, ENDOSCOPIC;  Surgeon: Maurilio Rodriguez MD;  Location: [facility] ENDO (2ND FLR);  Service: Endoscopy;  Laterality: N/A;  5 day hold Plavix, Dr Roman Walsh - pg  Covid-19 test 10/30/20 at [facility]    EPIDURAL STEROID INJECTION INTO CERVICAL SPINE N/A 12/8/2021    Procedure: Injection-steroid-epidural-cervical;  Surgeon: Socorro Lorenz MD;  Location: [facility] OR;  Service: Pain Management;  Laterality: N/A;    EPIDURAL STEROID INJECTION INTO CERVICAL SPINE N/A 11/30/2023    Procedure: Injection-steroid-epidural-cervical;  Surgeon: Maurilio Carroll MD;  Location: [facility] OR;  Service: Anesthesiology;  Laterality: N/A;  c7-T1    ESOPHAGOGASTRODUODENOSCOPY N/A 6/10/2021    Procedure: EGD (ESOPHAGOGASTRODUODENOSCOPY);  Surgeon: Sheree Metz MD;  Location: [facility] ENDO;  Service: Endoscopy;  Laterality: N/A;    ESOPHAGOSCOPY, USING BOUGIE, WITH DILATION N/A 1/3/2024    Procedure: ESOPHAGOSCOPY, USING BOUGIE, WITH DILATION;  Surgeon: Mir Belle MD;  Location: [facility] OR;  Service: ENT;  Laterality: N/A;    FRACTURE SURGERY Left 05/02/2022    ankle    HERNIA REPAIR      HYSTERECTOMY      INCISIONAL HERNIA REPAIR      INJECTION OF ANESTHETIC AGENT AROUND NERVE Right 5/27/2019    Procedure: RIGHT L5-S3 MEDIAL BRANCH BLOCKS;  Surgeon: Sneha Aragon MD;  Location: [facility] OR;  Service: Pain Management;  Laterality: Right;  **DO NOT STOP PLAVIX**    INJECTION OF JOINT Right 9/22/2021    Procedure: Injection, Joint; Right SI Joint Injection;  Surgeon: Socorro Lorenz MD;  Location: [facility] OR;  Service: Pain Management;  Laterality: Right;  Oral    INSERTION OF DORSAL COLUMN NERVE STIMULATOR FOR TRIAL N/A 6/7/2021    Procedure: INSERTION, NEUROSTIMULATOR, SPINAL CORD, DORSAL COLUMN, FOR TRIAL;  Surgeon: Socorro FAYE  MD Gerda;  Location: [facility] OR;  Service: Pain Management;  Laterality: N/A;  nevro rep confirmed start time    instestine      bowel section    KNEE ARTHROPLASTY Right 2019    Procedure: ARTHROPLASTY, KNEE;  Surgeon: Ike Briceño II, MD;  Location: [facility] OR;  Service: Orthopedics;  Laterality: Right;    KNEE SURGERY      OOPHORECTOMY      PARATHYROID GLAND SURGERY      3 surgeries    RADIOFREQUENCY ABLATION Right 6/10/2019    Procedure: Radiofrequency Ablation - RIGHT L3-5 RADIOFREQUENCY ABLATION WITH HALYARD COOLIEF THERMAL SYSTEM;  Surgeon: Sneha Aragon MD;  Location: [facility] OR;  Service: Pain Management;  Laterality: Right;  **HOLD PLAVIX x 7 DAYS PRIOR**    SPINAL CORD STIMULATOR REMOVAL N/A 2024    Procedure: REMOVAL, NEUROSTIMULATOR, SPINAL;  Surgeon: Maurilio Carroll MD;  Location: [facility] OR;  Service: Pain Management;  Laterality: N/A;  removal of spinal cord stimulator    UPPER GASTROINTESTINAL ENDOSCOPY       Social History     Tobacco Use    Smoking status: Former     Current packs/day: 0.00     Average packs/day: 1 pack/day for 46.0 years (46.0 ttl pk-yrs)     Types: Cigarettes     Start date: 1977     Quit date: 2023     Years since quittin.1    Smokeless tobacco: Never   Substance Use Topics    Alcohol use: Not Currently    Drug use: No          REVIEW OF SYSTEMS  CONSTITUTIONAL: No chills, no fatigue, no fever.   EYES: No double vision, no blurred vision  NEURO: No headaches, no dizziness  RESPIRATORY: No cough, no wheezing.    CARDIOVASCULAR: See HPI   GI: No abdominal pain, no melena, no diarrhea, no nausea or vomiting.   : No  dysuria and frequency, no hematuria  SKIN: no bruising, no discoloration  ENDOCRINE: no polyphagia, no heat intolerance, no cold intolerance  PSYCHIATRIC: no depression, no anxiety, no memory loss  MUSCULOSKELETAL: no  neck pain, no muscle weakness,no back pain          Objective:        Vitals:    24 1452   BP: 130/70   Pulse: 71   Resp: 16  "      PHYSICAL EXAM  CONSTITUTIONAL: In no apparent distress  HEENT: Normocephalic. Pupils normal and conjunctivae normal. No pallor  NECK: No JVD  LUNGS: B/L air entry to the lungs, clear to auscultation. No rales, wheezing or rhonchi.  HEART: Normal rate and regular rhythm. Normal S1 and S2.  No murmur   ABDOMEN: soft, non-tender; bowel sounds normal  EXTREMITIES: No edema. Good palpable distal pulses.  NEURO: AAO X 3, no gross sensory or motor deficits  SKIN:  No rash  Psych:  Normal affect    Lab Results   Component Value Date    WBC 7.65 01/02/2024    HGB 11.8 (L) 01/02/2024    HCT 37.8 01/02/2024     01/02/2024    CHOL 204 (H) 10/04/2023    TRIG 290 (H) 10/04/2023    HDL 51 10/04/2023    ALT 17 01/02/2024    AST 20 01/02/2024     01/02/2024    K 4.3 01/02/2024     01/02/2024    CREATININE 1.0 01/02/2024    BUN 20 01/02/2024    CO2 24 01/02/2024    TSH 2.932 12/19/2021        @  Lab Results   Component Value Date    CHOL 204 (H) 10/04/2023    CHOL 157 07/26/2022    CHOL 157 07/26/2022     Lab Results   Component Value Date    HDL 51 10/04/2023    HDL 40 07/26/2022    HDL 40 07/26/2022     Lab Results   Component Value Date    LDLCALC 95.0 10/04/2023    LDLCALC 67.4 07/26/2022    LDLCALC 67.4 07/26/2022     No results found for: "DLDL"  Lab Results   Component Value Date    TRIG 290 (H) 10/04/2023    TRIG 248 (H) 07/26/2022    TRIG 248 (H) 07/26/2022       f1   Lab Results   Component Value Date    CHOLHDL 25.0 10/04/2023    CHOLHDL 25.5 07/26/2022    CHOLHDL 25.5 07/26/2022            Current Outpatient Medications   Medication Instructions    acetaminophen (TYLENOL) 650 mg, Oral, Every 6 hours PRN    allopurinoL (ZYLOPRIM) 300 mg, Oral    azelastine (ASTELIN) 137 mcg (0.1 %) nasal spray 2 sprays, 2 times daily    busPIRone (BUSPAR) 5 mg, Oral, 2 times daily    clopidogreL (PLAVIX) 75 mg tablet TAKE 1 TABLET EVERY DAY    colestipoL (COLESTID) 2 g, Oral, Daily    cyanocobalamin 1,000 mcg, " Intramuscular, Every 14 days    EScitalopram oxalate (LEXAPRO) 20 mg, Oral, Nightly    fluticasone propionate (FLONASE) 50 mcg/actuation nasal spray 2 sprays, Each Nostril, 2 times daily    furosemide (LASIX) 20 mg, Oral    gabapentin (NEURONTIN) 400 MG capsule TAKE 1 CAPSULE TWICE A DAY    hydroCHLOROthiazide (HYDRODIURIL) 12.5 MG Tab TAKE 1 TABLET EVERY MONDAY, WEDNESDAY AND FRIDAY    HYDROcodone-acetaminophen (NORCO)  mg per tablet 1 tablet, Oral, Every 8 hours PRN    HYDROcodone-acetaminophen (NORCO)  mg per tablet 1 tablet, Oral, Every 8 hours PRN    [START ON 4/5/2024] HYDROcodone-acetaminophen (NORCO)  mg per tablet 1 tablet, Oral, Every 8 hours PRN    levothyroxine (SYNTHROID) 75 MCG tablet TAKE 1 TABLET DAILY    magnesium oxide (MAG-OX) 400 mg, Oral, Daily    methocarbamoL (ROBAXIN) 750 MG Tab TAKE 1 TABLET EVERY 8 HOURS AS NEEDED FOR MUSCLE SPASMS    metoprolol succinate (TOPROL-XL) 50 mg, Oral, Daily, Pt takes 1/2 tab daily    mupirocin (BACTROBAN) 2 % ointment Topical (Top), 3 times daily    onabotulinumtoxina (BOTOX) 200 unit SolR as directed    ondansetron (ZOFRAN-ODT) 4 MG TbDL PLACE 2 TABLETS ON THE TONGUE EVERY 8 HOURS AS NEEDED FOR NAUSEA    pantoprazole (PROTONIX) 40 MG tablet TAKE 1 TABLET DAILY    potassium chloride SA (KLOR-CON M20) 20 MEQ tablet 20 mEq, Oral, Daily    rosuvastatin (CRESTOR) 10 mg, Oral, Daily    sodium,potassium,mag sulfates (SUPREP BOWEL PREP KIT) 17.5-3.13-1.6 gram SolR Take as directed before colonoscopy    traZODone (DESYREL) 100 MG tablet TAKE 1 TABLET EVERY EVENING    ZENPEP 40,000-126,000- 168,000 unit CpDR Oral                   All pertinent labs, imaging, and EKGs reviewed.  Patient's most recent EKG tracing was personally interpreted by this provider.    Problem List Items Addressed This Visit          Cardiac/Vascular    HTN (hypertension), onset in her 20s (Chronic)    Bradycardia     Other Visit Diagnoses       Pre-op evaluation    -  Primary     Atherosclerosis of native coronary artery of native heart without angina pectoris                Follow up in about 2 months (around 4/6/2024).

## 2024-03-11 ENCOUNTER — PATIENT MESSAGE (OUTPATIENT)
Dept: FAMILY MEDICINE | Facility: CLINIC | Age: 69
End: 2024-03-11
Payer: MEDICARE

## 2024-03-11 ENCOUNTER — TELEPHONE (OUTPATIENT)
Dept: PHYSICAL MEDICINE AND REHAB | Facility: CLINIC | Age: 69
End: 2024-03-11
Payer: MEDICARE

## 2024-03-11 NOTE — TELEPHONE ENCOUNTER
Spoke with pt and was able to get her rescheduled due to being sick. I informed pt of next available date for EMG and let her know I placed her on the waiting list. Pt verbalized understanding and was satisfied.     ----- Message from Lilliana Jose sent at 3/11/2024  1:00 PM CDT -----  Type: Needs Medical Advice  Who Called:  pt    Best Call Back Number: [phone]    Additional Information: Pt is calling the office pt needs to reschedule appt for today.please call back and advise.

## 2024-03-20 ENCOUNTER — PATIENT MESSAGE (OUTPATIENT)
Dept: ADMINISTRATIVE | Facility: HOSPITAL | Age: 69
End: 2024-03-20
Payer: MEDICARE

## 2024-03-27 ENCOUNTER — OFFICE VISIT (OUTPATIENT)
Dept: PAIN MEDICINE | Facility: CLINIC | Age: 69
End: 2024-03-27
Payer: MEDICARE

## 2024-03-27 VITALS
HEIGHT: 67 IN | DIASTOLIC BLOOD PRESSURE: 73 MMHG | WEIGHT: 192.88 LBS | HEART RATE: 65 BPM | SYSTOLIC BLOOD PRESSURE: 145 MMHG | BODY MASS INDEX: 30.27 KG/M2 | OXYGEN SATURATION: 98 %

## 2024-03-27 DIAGNOSIS — M54.16 LUMBAR RADICULOPATHY, CHRONIC: ICD-10-CM

## 2024-03-27 DIAGNOSIS — M96.1 POSTLAMINECTOMY SYNDROME OF LUMBAR REGION: Primary | ICD-10-CM

## 2024-03-27 DIAGNOSIS — M51.36 DDD (DEGENERATIVE DISC DISEASE), LUMBAR: ICD-10-CM

## 2024-03-27 PROCEDURE — 99213 OFFICE O/P EST LOW 20 MIN: CPT | Mod: S$PBB,,,

## 2024-03-27 PROCEDURE — 99213 OFFICE O/P EST LOW 20 MIN: CPT | Mod: PBBFAC

## 2024-03-27 PROCEDURE — 99999 PR PBB SHADOW E&M-EST. PATIENT-LVL III: CPT | Mod: PBBFAC,,,

## 2024-03-27 RX ORDER — RESPIRATORY SYNCYTIAL VISUS VACCINE RECOMBINANT, ADJUVANTED 120MCG/0.5
KIT INTRAMUSCULAR
COMMUNITY
Start: 2024-01-10

## 2024-03-27 RX ORDER — LOSARTAN POTASSIUM 100 MG/1
100 TABLET ORAL
COMMUNITY
Start: 2024-02-13 | End: 2024-05-15

## 2024-03-27 NOTE — PROGRESS NOTES
"  FOLLOW UP NOTE:     CHIEF COMPLAINT: neck and low back pain    INTERVAL HISTORY: Alessia Nelson is a 68 y.o. female with PMH significant for hx of cervical spine surgery, hx of lumbar spine surgery X 3, hx of peritonitis requiring bowel resection, and torticollis (per patient report) presents as an established patient for the continued management of back, hip, and neck pain.  The patient presents today for site check of SCS explant.  The SCS was explanted on 2/21/2024.   The patient reports h/o chronic pancreatitis and reports "stomach pain" is the worse of her pain. The patient also reports of lumbosacral pain that radiates down the lateral aspect of her right lower extremity. The patient also denies of any changes in the character of her pain since her last appointment. The patient reports that her current pain is a 7/10. Patient denies of any urinary/fecal incontinence, saddle anesthesia, or weakness.     PRIOR HPI: Alessia Nelson is a 65 y.o. female with PMH significant for hx of cervical spine surgery, hx of lumbar spine surgery X 3, hx of peritonitis requiring bowel resection, and torticollis (per patient report) presents as an established patient of Dr. Aragon (new to me) for the evaluation of low back and neck pain. The patient reports that she has been in pain for over twenty years. She reports that she had her neck surgery in 2006/2007 in [place]. She reports of benefit in regards to her neck pain. The patient reports of her initial lumbar spine surgery in 2007/2008 in [place] as well. She reports of improvement after her initial surgery. She reports that she had a second lumbar spine surgery in 2008/2009 as her pain recurred. She reports of benefit with this surgery as well. She reports of a third lumbar spine surgery in 2012. She reports of doing well for a couple years until she fell out of her bed. The patient reports that her pain has been worsening ever since then. Low back pain > neck pain. The " patient localizes her pain to the area across her lower back.  The patient reports of radiation down her RLE to her knee. The patient reports of numbness in her feet and hands bilaterally. The patient describes her pain as a sharp and aching type of pain. The patient reports that her pain is worsened with any activity that requires walking and activities such as doing laundry. The patient reports of benefit with pain medication. The patient reports that her current pain is a 6/10. Patient denies of any urinary/fecal incontinence, saddle anesthesia, or weakness.      In regards to her neck pain, the patient localizes her pain to the middle/right side of her neck. The patient reports of radiation down her RUE to her elbow. The patient reports that her current pain in her neck is a 6/10. Of note, the patient reports that she would be willing to undergo surgery if she has to. I have updated the patient's current pain regimen below.         INTERVENTIONAL PAIN HISTORY:  11/30/2023: Cervical Jessica at C7-T1- no relief.   12/8/2021: C7-T1 cervical interlaminar epidural steroid injection - no relief  9/22/2021:  Right sacroiliac joint injection - significant relief  6/18/2021: Insertion of SCS (Nevro)   6/7/2021: SCS Trial - 70 - 80% benefit  3/1/2021: Caudal epidural steroid injection - no benefit   6/10/2019: L5-S3 Right Medial Branch Nerve Coolief thermal Radiofrequency Ablation via Dr. Aragon - no benefit per chart review     CURRENT PAIN MEDICATIONS:   Percocet  mg PO TID  OTC Tylenol   Gabapentin 400 mg PO BID  Baclofen 10 mg PO BID/TID  No longer taking:     tizanidine causes dry mouth.           ROS:  Review of Systems   Constitutional:  Negative for chills and fever.   HENT:  Negative for sore throat.    Eyes:  Negative for visual disturbance.   Respiratory:  Negative for shortness of breath.    Cardiovascular:  Negative for chest pain.   Gastrointestinal:  Negative for nausea and vomiting.   Genitourinary:   "Negative for difficulty urinating.   Musculoskeletal:  Positive for arthralgias, back pain, gait problem and neck pain.   Skin:  Negative for rash.   Allergic/Immunologic: Negative for immunocompromised state.   Neurological:  Positive for numbness. Negative for syncope.   Hematological:  Does not bruise/bleed easily.   Psychiatric/Behavioral:  Negative for suicidal ideas.    All other systems reviewed and are negative.       MEDICAL, SURGICAL, FAMILY, SOCIAL HX: reviewed    MEDICATIONS/ALLERGIES: reviewed    PHYSICAL EXAM:    VITALS: Vitals reviewed.   Vitals:    03/27/24 1029 03/27/24 1031   BP: (!) 145/73    Pulse: 65    SpO2: 98%    Weight: 87.5 kg (192 lb 14.4 oz)    Height: 5' 7" (1.702 m)    PainSc:   7   7   PainLoc: Back               UPPER EXTREMITIES: Normal alignment, normal range of motion, no atrophy, no skin changes,  hair growth and nail growth normal and equal bilaterally. No swelling, no tenderness.    LOWER EXTREMITIES:  Normal alignment, normal range of motion, no atrophy, no skin changes,  hair growth and nail growth normal and equal bilaterally. No swelling, no tenderness.     CERVICAL SPINE:  Cervical spine: ROM is full in flexion, extension and lateral rotation with increased pain with passive ROM and lateral rotation.   Spurling's maneuver - deferred given prior fusion  Myofascial exam: Tenderness to palpation across cervical paraspinous region bilaterally. Prior anterior scar is well-healed.      LUMBAR SPINE:    Lumbar spine: ROM is limited with flexion extension and oblique extension with increased pain to any maneuvar.    ((+)) Supine straight leg raise R > L  ((+)) Facet loading bilateral  Internal and external rotation of the hip causes no increased pain on either side.  Myofascial exam: Tenderness to palpation across lumbar paraspinous muscles.     ((+)) TTP at the SI joint bilaterally  ((+)) MIKEY's test  ((+)) One leg stand    ((+)) Distraction test    MOTOR: Tone and bulk: normal " "bilateral upper and lower Strength: normal    SENSATION: Light touch and pinprick intact bilaterally  REFLEXES: normal, symmetric, nonbrisk.  Toes down, no clonus. Negative hilliard's sign bilaterally.  GAIT: normal rise, base, steps, and arm swing.      IMAGING: EMG - 8/11/2022 (Completed at [facility])     " IMPRESSION:   This is an abnormal study.  No electrodiagnostic evidence of neuropathy of left upper extremity of nerves tested or left cervical radiculopathy at this time.  Electrodiagnostic evidence of chronic bilateral lower extremity polyneuropathy in areas tested. Sensory, motor, demyelinating, axonal. Hx of hypothyroidism.   No electrodiagnostic evidence of acute bilateral lumbosacral radiculopathy at this time.     ASSESSMENT:   Patient presents for site check. Site clean, dry and intact. Incision sites are healed with no erythema or edema.   1. Postlaminectomy syndrome of lumbar region    2. Lumbar radiculopathy, chronic    3. DDD (degenerative disc disease), lumbar                        PLAN:  I have stressed the importance of physical activity and exercise to improve overall health  Reviewed pertinent imaging and records with patient  Discussed ANTHONY at L5-S1 if lumbosacral pain persists. Patient can call to schedule if she chooses.      Continue Norco  mg PO q 8 hr PRN for breakthrough pain; # 90 tablets; 2 refills.  reviewed without discrepancies.    Keep scheduled med refill f/u appt   Laura Heath NP                         "

## 2024-04-10 DIAGNOSIS — K21.9 GASTROESOPHAGEAL REFLUX DISEASE, UNSPECIFIED WHETHER ESOPHAGITIS PRESENT: ICD-10-CM

## 2024-04-10 DIAGNOSIS — F51.01 PRIMARY INSOMNIA: ICD-10-CM

## 2024-04-11 RX ORDER — TRAZODONE HYDROCHLORIDE 100 MG/1
TABLET ORAL
Qty: 90 TABLET | Refills: 1 | Status: SHIPPED | OUTPATIENT
Start: 2024-04-11

## 2024-04-11 RX ORDER — PANTOPRAZOLE SODIUM 40 MG/1
TABLET, DELAYED RELEASE ORAL
Qty: 90 TABLET | Refills: 1 | Status: SHIPPED | OUTPATIENT
Start: 2024-04-11

## 2024-04-11 NOTE — TELEPHONE ENCOUNTER
Refill Decision Note   Alessia Omar  is requesting a refill authorization.  Brief Assessment and Rationale for Refill:  Approve     Medication Therapy Plan:         Comments:     Note composed:3:31 PM 04/11/2024

## 2024-04-11 NOTE — TELEPHONE ENCOUNTER
No care due was identified.  [facility] Embedded Care Due Messages. Reference number: [id number].   4/10/2024 11:19:20 PM CDT

## 2024-04-15 ENCOUNTER — OFFICE VISIT (OUTPATIENT)
Dept: PHYSICAL MEDICINE AND REHAB | Facility: CLINIC | Age: 69
End: 2024-04-15
Payer: MEDICARE

## 2024-04-15 VITALS — HEIGHT: 67 IN | WEIGHT: 192 LBS | BODY MASS INDEX: 30.13 KG/M2

## 2024-04-15 DIAGNOSIS — M54.12 CERVICAL RADICULOPATHY: ICD-10-CM

## 2024-04-15 DIAGNOSIS — G56.03 BILATERAL CARPAL TUNNEL SYNDROME: Primary | ICD-10-CM

## 2024-04-15 DIAGNOSIS — G56.03 CARPAL TUNNEL SYNDROME ON BOTH SIDES: ICD-10-CM

## 2024-04-15 PROCEDURE — 95886 MUSC TEST DONE W/N TEST COMP: CPT | Mod: PBBFAC,PN | Performed by: STUDENT IN AN ORGANIZED HEALTH CARE EDUCATION/TRAINING PROGRAM

## 2024-04-15 PROCEDURE — 95886 MUSC TEST DONE W/N TEST COMP: CPT | Mod: 26,S$PBB,, | Performed by: STUDENT IN AN ORGANIZED HEALTH CARE EDUCATION/TRAINING PROGRAM

## 2024-04-15 PROCEDURE — 95911 NRV CNDJ TEST 9-10 STUDIES: CPT | Mod: PBBFAC,PN | Performed by: STUDENT IN AN ORGANIZED HEALTH CARE EDUCATION/TRAINING PROGRAM

## 2024-04-15 PROCEDURE — 99499 UNLISTED E&M SERVICE: CPT | Mod: S$PBB,,, | Performed by: STUDENT IN AN ORGANIZED HEALTH CARE EDUCATION/TRAINING PROGRAM

## 2024-04-15 PROCEDURE — 99211 OFF/OP EST MAY X REQ PHY/QHP: CPT | Mod: PBBFAC,PN | Performed by: STUDENT IN AN ORGANIZED HEALTH CARE EDUCATION/TRAINING PROGRAM

## 2024-04-15 PROCEDURE — 95911 NRV CNDJ TEST 9-10 STUDIES: CPT | Mod: 26,S$PBB,, | Performed by: STUDENT IN AN ORGANIZED HEALTH CARE EDUCATION/TRAINING PROGRAM

## 2024-04-15 PROCEDURE — 99999 PR PBB SHADOW E&M-EST. PATIENT-LVL I: CPT | Mod: PBBFAC,,, | Performed by: STUDENT IN AN ORGANIZED HEALTH CARE EDUCATION/TRAINING PROGRAM

## 2024-04-15 NOTE — PROGRESS NOTES
[facility]  Physical Medicine and Rehabilitation   [address] [place] [address], [address]  [place] [address]       Patient: Alessia Nelson   Patient ID: [id number]   Sex: Female   YOB: 1955   Age: 68 Years 6 Months   Notes: Numbness and tingling in BUE when laying down in wrist and fingertips and pain in the thumb up through the wrist on the right.   Last visit date: 4/15/2024         Visit date and time: 4/15/2024 12:48   Patient Age on Visit Date: 68 Years 6 Months   Referring Physician: Laura Heath NP   Diagnoses:         Sensory NCS      Nerve / Sites Rec. Site Onset Lat Peak Lat Ref. NP Amp Ref. PP Amp Ref. Segments Distance Velocity     ms ms ms µV µV µV µV  cm m/s   L Median - Digit II (Antidromic)      Wrist Dig II 3.07 3.85 ?3.40 16.6 ?15.0 34.5 ?20.0 Wrist - Dig II 13 42   R Median - Digit II (Antidromic)      Wrist Dig II 2.92 3.70 ?3.40 11.0 ?15.0 19.5 ?20.0 Wrist - Dig II 13 45   L Ulnar - Digit V (Antidromic)      Wrist Dig V 2.08 2.97 ?3.10 14.4 ?10.0 24.9 ?15.0 Wrist - Dig V 11 53   R Ulnar - Digit V (Antidromic)      Wrist Dig V 2.03 2.66 ?3.10 20.2 ?10.0 25.5 ?15.0 Wrist - Dig V 11 54   R Radial - Anatomical snuff box (Forearm)      Forearm Wrist 1.82 2.60 ?2.90 37.4 ?15.0  ?15.0 Forearm - Wrist 10 55   L Radial - Anatomical snuff box (Forearm)      Forearm Wrist 1.25 2.08 ?2.90 16.2 ?15.0  ?15.0 Forearm - Wrist 10 80       Motor NCS      Nerve / Sites Muscle Latency Ref. Amplitude Ref. Amp % Duration Segments Distance Lat Diff Velocity Ref.     ms ms mV mV % ms  cm ms m/s m/s   L Median - APB      Wrist APB 4.17 ?4.40 5.4 ?4.0 100 3.96 Wrist - APB 7         Elbow APB 8.96  4.7  86.2 4.53 Elbow - Wrist 24 4.79 50 ?49   R Median - APB      Wrist APB 3.96 ?4.40 7.9 ?4.0 100 5.26 Wrist - APB 7         Elbow APB 7.92  7.8  98.5 5.78 Elbow - Wrist 20 3.96 51 ?49   L Ulnar - ADM      Wrist ADM 2.92 ?3.60 5.5 ?5.0 100 6.82 Wrist - ADM 7         B.Elbow ADM 6.56  4.7  85 6.61  B.Elbow - Wrist 24 3.65 66 ?49   R Ulnar - ADM      Wrist ADM 2.81 ?3.60 5.4 ?5.0 100 7.03 Wrist - ADM 7         B.Elbow ADM 6.61  5.4  99.7 7.19 B.Elbow - Wrist 20 3.80 53 ?49       EMG Summary Table     Spontaneous MUAP Recruitment   Muscle Nerve Roots IA Fib PSW Fasc H.F. Amp Dur. PPP Pattern   L. Deltoid Axillary C5-C6 N None None None None N N N N   L. Biceps brachii Musculocutaneous C5-C6 N None None None None N N N N   L. Triceps brachii Radial C6-C8 N None None None None N N N N   L. Pronator teres Median C6-C7 N None None None None N N N N   L. Abductor pollicis brevis Median C8-T1 N None None None None N N N N   L. First dorsal interosseous Ulnar C8-T1 N None None None None N N N N   L. Cervical paraspinals Spinal C4-C8 N None None None None N N N N   R. Deltoid Axillary C5-C6 N None None None None N N N N   R. Biceps brachii Musculocutaneous C5-C6 N None None None None N N N N   R. Triceps brachii Radial C6-C8 N None None None None N N N N   R. Pronator teres Median C6-C7 N None None None None N N N N   R. Abductor pollicis brevis Median C8-T1 N None None None None N N N N   R. First dorsal interosseous Ulnar C8-T1 N None None None None N N N N   R. Cervical paraspinals Spinal C4-C8 N None None None None N N N N       Summary    The motor conduction test was normal in all 4 of the tested nerves: L Median - APB, R Median - APB, L Ulnar - ADM, R Ulnar - ADM.    The sensory conduction test was performed on 6 nerve(s). The results were normal in 4 nerve(s): L Ulnar - Digit V (Antidromic), R Ulnar - Digit V (Antidromic), R Radial - Anatomical snuff box (Forearm), L Radial - Anatomical snuff box (Forearm). Results outside the specified normal range were found in 2 nerve(s), as follows:  In the L Median - Digit II (Antidromic) study  the peak latency result was increased for Wrist stimulation  In the R Median - Digit II (Antidromic) study  the peak latency result was increased for Wrist stimulation  the peak  amplitude result was reduced for Wrist stimulation    The needle EMG study was normal in all 14 tested muscles: L. Deltoid, L. Biceps brachii, L. Triceps brachii, L. Pronator teres, L. Abductor pollicis brevis, L. First dorsal interosseous, L. Cervical paraspinals, R. Deltoid, R. Biceps brachii, R. Triceps brachii, R. Pronator teres, R. Abductor pollicis brevis, R. First dorsal interosseous, R. Cervical paraspinals.    Conclusion:     Abnormal study    EMG and nerve conduction study of both upper extremities revealed the following:     BILATERAL mild median mononeuropathy at the wrists (carpal tunnel syndrome)   Complex repetitive discharges (CRDs) noted diffusely throughout the cervical paraspinal muscles and right triceps muscle. These waveforms are often associated with chronic neurogenic or myopathic disease, but incidentally identified CRDs with otherwise normal motor units have unclear significance and can be normal. Clinical correlation recommended.     There was no electrodiagnostic evidence of cervical radiculopathy, plexopathy, peripheral polyneuropathy or myopathy    PLAN: Recommended bracing of the involved wrist at night and f/u with referring provider for additional management    ____________________________  Milan Hayden MD

## 2024-04-22 ENCOUNTER — OFFICE VISIT (OUTPATIENT)
Dept: PAIN MEDICINE | Facility: CLINIC | Age: 69
End: 2024-04-22
Payer: MEDICARE

## 2024-04-22 ENCOUNTER — PATIENT OUTREACH (OUTPATIENT)
Dept: ADMINISTRATIVE | Facility: HOSPITAL | Age: 69
End: 2024-04-22
Payer: MEDICARE

## 2024-04-22 DIAGNOSIS — G89.4 CHRONIC PAIN DISORDER: Primary | ICD-10-CM

## 2024-04-22 DIAGNOSIS — M79.673 PAIN OF FOOT, UNSPECIFIED LATERALITY: ICD-10-CM

## 2024-04-22 DIAGNOSIS — M54.16 LUMBAR RADICULOPATHY, CHRONIC: ICD-10-CM

## 2024-04-22 DIAGNOSIS — M96.1 POSTLAMINECTOMY SYNDROME OF LUMBAR REGION: ICD-10-CM

## 2024-04-22 DIAGNOSIS — M51.36 DDD (DEGENERATIVE DISC DISEASE), LUMBAR: Primary | ICD-10-CM

## 2024-04-22 PROCEDURE — 99213 OFFICE O/P EST LOW 20 MIN: CPT | Mod: PBBFAC

## 2024-04-22 PROCEDURE — 99999 PR PBB SHADOW E&M-EST. PATIENT-LVL III: CPT | Mod: PBBFAC,,,

## 2024-04-22 PROCEDURE — 99214 OFFICE O/P EST MOD 30 MIN: CPT | Mod: S$PBB,,,

## 2024-04-22 RX ORDER — HYDROCODONE BITARTRATE AND ACETAMINOPHEN 10; 325 MG/1; MG/1
1 TABLET ORAL EVERY 8 HOURS PRN
Qty: 90 TABLET | Refills: 0 | Status: SHIPPED | OUTPATIENT
Start: 2024-06-12 | End: 2024-05-10

## 2024-04-22 RX ORDER — HYDROCODONE BITARTRATE AND ACETAMINOPHEN 10; 325 MG/1; MG/1
1 TABLET ORAL EVERY 8 HOURS PRN
Qty: 90 TABLET | Refills: 0 | Status: SHIPPED | OUTPATIENT
Start: 2024-05-14 | End: 2024-05-10

## 2024-04-22 NOTE — PROGRESS NOTES
"  FOLLOW UP NOTE:     CHIEF COMPLAINT: neck and low back pain    INTERVAL HISTORY: Alessia Nelson is a 68 y.o. female with PMH significant for hx of cervical spine surgery, hx of lumbar spine surgery X 3, hx of peritonitis requiring bowel resection, and torticollis (per patient report) presents as an established patient for the continued management of back, hip, and neck pain.  The patient presents today for site check of SCS explant.  The SCS was explanted on 2/21/2024.   The patient reports h/o chronic pancreatitis and reports "stomach pain" is the worse of her pain. The patient also reports of lumbosacral pain that radiates down the lateral aspect of her right lower extremity. The patient also denies of any changes in the character of her pain since her last appointment. The patient reports that her current pain is a 7/10. The patient had recent EMG study significant for bilateral CTS. Patient denies of any urinary/fecal incontinence, saddle anesthesia, or weakness.     PRIOR HPI: Alessia Nelson is a 65 y.o. female with PMH significant for hx of cervical spine surgery, hx of lumbar spine surgery X 3, hx of peritonitis requiring bowel resection, and torticollis (per patient report) presents as an established patient of Dr. Aragon (new to me) for the evaluation of low back and neck pain. The patient reports that she has been in pain for over twenty years. She reports that she had her neck surgery in 2006/2007 in [place]. She reports of benefit in regards to her neck pain. The patient reports of her initial lumbar spine surgery in 2007/2008 in [place] as well. She reports of improvement after her initial surgery. She reports that she had a second lumbar spine surgery in 2008/2009 as her pain recurred. She reports of benefit with this surgery as well. She reports of a third lumbar spine surgery in 2012. She reports of doing well for a couple years until she fell out of her bed. The patient reports that her pain has " been worsening ever since then. Low back pain > neck pain. The patient localizes her pain to the area across her lower back.  The patient reports of radiation down her RLE to her knee. The patient reports of numbness in her feet and hands bilaterally. The patient describes her pain as a sharp and aching type of pain. The patient reports that her pain is worsened with any activity that requires walking and activities such as doing laundry. The patient reports of benefit with pain medication. The patient reports that her current pain is a 6/10. Patient denies of any urinary/fecal incontinence, saddle anesthesia, or weakness.      In regards to her neck pain, the patient localizes her pain to the middle/right side of her neck. The patient reports of radiation down her RUE to her elbow. The patient reports that her current pain in her neck is a 6/10. Of note, the patient reports that she would be willing to undergo surgery if she has to. I have updated the patient's current pain regimen below.         INTERVENTIONAL PAIN HISTORY:  11/30/2023: Cervical Jessica at C7-T1- no relief.   12/8/2021: C7-T1 cervical interlaminar epidural steroid injection - no relief  9/22/2021:  Right sacroiliac joint injection - significant relief  6/18/2021: Insertion of SCS (Nevro)   6/7/2021: SCS Trial - 70 - 80% benefit  3/1/2021: Caudal epidural steroid injection - no benefit   6/10/2019: L5-S3 Right Medial Branch Nerve Coolief thermal Radiofrequency Ablation via Dr. Aragon - no benefit per chart review     CURRENT PAIN MEDICATIONS:   Percocet  mg PO TID  OTC Tylenol   Gabapentin 400 mg PO BID  Baclofen 10 mg PO BID/TID  No longer taking:     tizanidine causes dry mouth.           ROS:  Review of Systems   Constitutional:  Negative for chills and fever.   HENT:  Negative for sore throat.    Eyes:  Negative for visual disturbance.   Respiratory:  Negative for shortness of breath.    Cardiovascular:  Negative for chest pain.    Gastrointestinal:  Negative for nausea and vomiting.   Genitourinary:  Negative for difficulty urinating.   Musculoskeletal:  Positive for arthralgias, back pain, gait problem and neck pain.   Skin:  Negative for rash.   Allergic/Immunologic: Negative for immunocompromised state.   Neurological:  Positive for numbness. Negative for syncope.   Hematological:  Does not bruise/bleed easily.   Psychiatric/Behavioral:  Negative for suicidal ideas.    All other systems reviewed and are negative.       MEDICAL, SURGICAL, FAMILY, SOCIAL HX: reviewed    MEDICATIONS/ALLERGIES: reviewed    PHYSICAL EXAM:    VITALS: Vitals reviewed.   There were no vitals filed for this visit.             UPPER EXTREMITIES: Normal alignment, normal range of motion, no atrophy, no skin changes,  hair growth and nail growth normal and equal bilaterally. No swelling, no tenderness.    LOWER EXTREMITIES:  Normal alignment, normal range of motion, no atrophy, no skin changes,  hair growth and nail growth normal and equal bilaterally. No swelling, no tenderness.     CERVICAL SPINE:  Cervical spine: ROM is full in flexion, extension and lateral rotation with increased pain with passive ROM and lateral rotation.   Spurling's maneuver - deferred given prior fusion  Myofascial exam: Tenderness to palpation across cervical paraspinous region bilaterally. Prior anterior scar is well-healed.      LUMBAR SPINE:    Lumbar spine: ROM is limited with flexion extension and oblique extension with increased pain to any maneuvar.    ((+)) Supine straight leg raise R > L  ((+)) Facet loading bilateral  Internal and external rotation of the hip causes no increased pain on either side.  Myofascial exam: Tenderness to palpation across lumbar paraspinous muscles.     ((+)) TTP at the SI joint bilaterally  ((+)) MIKEY's test  ((+)) One leg stand    ((+)) Distraction test    MOTOR: Tone and bulk: normal bilateral upper and lower Strength: normal    SENSATION: Light  "touch and pinprick intact bilaterally  REFLEXES: normal, symmetric, nonbrisk.  Toes down, no clonus. Negative hilliard's sign bilaterally.  GAIT: normal rise, base, steps, and arm swing.      IMAGING: EMG - 8/11/2022 (Completed at [facility])     " IMPRESSION:   This is an abnormal study.  No electrodiagnostic evidence of neuropathy of left upper extremity of nerves tested or left cervical radiculopathy at this time.  Electrodiagnostic evidence of chronic bilateral lower extremity polyneuropathy in areas tested. Sensory, motor, demyelinating, axonal. Hx of hypothyroidism.   No electrodiagnostic evidence of acute bilateral lumbosacral radiculopathy at this time.     ASSESSMENT:   Patient presents for site check. Site clean, dry and intact. Incision sites are healed with no erythema or edema.   1. DDD (degenerative disc disease), lumbar    2. Pain of foot, unspecified laterality    3. Postlaminectomy syndrome of lumbar region    4. Lumbar radiculopathy, chronic                          PLAN:  I have stressed the importance of physical activity and exercise to improve overall health  Reviewed pertinent imaging and records with patient  Discussed bilateral wrist bracing. If pain fails to improve will refer to Ortho for bilat carpal tunnel management.   Discussed foot pain, referral placed to Podiatry.   Discussed ANTHONY at L5-S1 if lumbosacral pain persists. Patient can call to schedule if she chooses.      refilled Norco  mg PO q 8 hr PRN for breakthrough pain; # 90 tablets; 1 refills.  reviewed without discrepancies.    F/U 3 months or sooner if needed   All medication management performed by Dr. Carroll.    Laura Heath, ANUM                           "

## 2024-04-26 ENCOUNTER — HOSPITAL ENCOUNTER (OUTPATIENT)
Dept: RADIOLOGY | Facility: HOSPITAL | Age: 69
Discharge: HOME | End: 2024-04-26
Attending: STUDENT IN AN ORGANIZED HEALTH CARE EDUCATION/TRAINING PROGRAM
Payer: MEDICARE

## 2024-04-26 DIAGNOSIS — R91.1 LUNG NODULE: ICD-10-CM

## 2024-04-26 DIAGNOSIS — R91.8 ABNORMAL CT LUNG SCREENING: Primary | ICD-10-CM

## 2024-04-26 PROCEDURE — 71250 CT THORAX DX C-: CPT | Mod: 26,,, | Performed by: RADIOLOGY

## 2024-04-26 PROCEDURE — 71250 CT THORAX DX C-: CPT | Mod: TC

## 2024-04-30 ENCOUNTER — TELEPHONE (OUTPATIENT)
Dept: PULMONOLOGY | Facility: CLINIC | Age: 69
End: 2024-04-30
Payer: MEDICARE

## 2024-04-30 NOTE — TELEPHONE ENCOUNTER
Pt returned call, states that she has contacted MD Soliz and is waiting on a return call   states that she was called yesterday and has an appt with Dr Olivier on Monday  advised I don't see where anyone other than myself called her  went through the schedule again and  Dr Olivier had an opening at 2:20 5/1  pt was moved to that time slot and v/u

## 2024-04-30 NOTE — TELEPHONE ENCOUNTER
Lmom that referral was received from Dr Calvert and that she can be seen next week and to call the office back      Appt scheduled for 5/8 at 3 pm pending pt agreement

## 2024-05-01 ENCOUNTER — TELEPHONE (OUTPATIENT)
Dept: PULMONOLOGY | Facility: CLINIC | Age: 69
End: 2024-05-01
Payer: MEDICARE

## 2024-05-01 ENCOUNTER — OFFICE VISIT (OUTPATIENT)
Dept: PULMONOLOGY | Facility: CLINIC | Age: 69
End: 2024-05-01
Payer: MEDICARE

## 2024-05-01 VITALS
HEIGHT: 67 IN | BODY MASS INDEX: 30.74 KG/M2 | SYSTOLIC BLOOD PRESSURE: 136 MMHG | HEART RATE: 75 BPM | WEIGHT: 195.88 LBS | DIASTOLIC BLOOD PRESSURE: 77 MMHG | OXYGEN SATURATION: 94 %

## 2024-05-01 DIAGNOSIS — R91.1 LUNG NODULE: ICD-10-CM

## 2024-05-01 DIAGNOSIS — J41.1 BRONCHITIS, CHRONIC, MUCOPURULENT: ICD-10-CM

## 2024-05-01 DIAGNOSIS — J44.9 CHRONIC OBSTRUCTIVE PULMONARY DISEASE, UNSPECIFIED COPD TYPE: Primary | Chronic | ICD-10-CM

## 2024-05-01 DIAGNOSIS — R91.8 ABNORMAL CT LUNG SCREENING: ICD-10-CM

## 2024-05-01 PROCEDURE — 99205 OFFICE O/P NEW HI 60 MIN: CPT | Mod: S$PBB,,, | Performed by: INTERNAL MEDICINE

## 2024-05-01 PROCEDURE — 99213 OFFICE O/P EST LOW 20 MIN: CPT | Mod: PBBFAC,PO | Performed by: INTERNAL MEDICINE

## 2024-05-01 PROCEDURE — 99999 PR PBB SHADOW E&M-EST. PATIENT-LVL III: CPT | Mod: PBBFAC,,, | Performed by: INTERNAL MEDICINE

## 2024-05-01 RX ORDER — FLUTICASONE FUROATE, UMECLIDINIUM BROMIDE AND VILANTEROL TRIFENATATE 200; 62.5; 25 UG/1; UG/1; UG/1
1 POWDER RESPIRATORY (INHALATION) DAILY
Qty: 60 EACH | Refills: 11 | Status: SHIPPED | OUTPATIENT
Start: 2024-05-01 | End: 2024-05-10

## 2024-05-01 NOTE — PATIENT INSTRUCTIONS
Ct chest from 10/2023 showed vague density in right lower lung superior segment---- area worsened by ct 4/2024.  Would check pet scan but better after germ addressed...        You have chronic yellow mucous from lung and you have had aspiration problems...    You have mild calcified coronary arteries and history of stent.  You are to follow up with cardiology.    You benefitted from inahlers in past -- and have some short breath.  Use trelegy daily -- continue if helps.  Would check lung capacity    You had history c diff---doxycycline should be safe .     Culture, pet scan in 2 weeks, if pet scan shows abnormality bx-- if not culture/antibiotic or bronchoscopy with special biopsy???.    Would do biopsy if pet scan suggests need.    Will do phone report pet, notify for significant culture result, dose antibiotic if needed....

## 2024-05-01 NOTE — PROGRESS NOTES
5/1/2024    Alessia Nelson  New Patient Consult    Chief Complaint   Patient presents with    Pulmonary Nodules       HPI:  5/1/2024 pt criminal justice-- [facility].  Pt smoked til 1/2023.    Pt had neck surg 18 yrs ago, needed esophagus dilated many yrs ago-- and subsequently has had another dilation ---has sense of not being able to swallow-- several times daily.   Had had creamy yellow mucous.  No fever, appetite was real good since 1/2024 with 20 lbs wt gain.       Pt had had routine screen ct screen in 10/2023 and 4/2024 with patchy rll sup segment apical component infiltrate    Pt had had pneumonia 2-3 times.    Pt had blood clot between chest wall and lung-- drained -- 1986....occured aroung time of laproscopyic procedure.       Pt suspected of having add,   Pt had had dilation esophagus-- gets dilation by gi outside [place], has more trouble swallow recently -- denies aspiration-- pt now overdue for dilation...  Pt slept only 4 h rs last 3 days -- pt felt to have some ptsd --    Pt does have some sob    Will have central chest pains -- left main and lad and rca have some calcification on ct chest   had prior stent and , last stress test in 9/2020 with no ischemia seen.     PFSH:  Past Medical History:   Diagnosis Date    Acute pancreatitis     Back pain     CAD (coronary artery disease)     CHF (congestive heart failure)     COPD (chronic obstructive pulmonary disease)     Depression     Diverticulosis     Encounter for blood transfusion     GERD (gastroesophageal reflux disease)     History of blood clots     1986 AFTER BOWEL RESCETION    History of bowel resection     Hyperlipidemia     Hypertension     Peritonitis     Seizures     HAD A SEIZURE FROM PHENERGAN     Stroke     Thyroid disease     TIA (transient ischemic attack)     Wears dentures     upper         Past Surgical History:   Procedure Laterality Date    ADRENAL GLAND SURGERY      APPENDECTOMY      BACK SURGERY      CAUDAL EPIDURAL  STEROID INJECTION N/A 3/1/2021    Procedure: Injection-steroid-epidural-caudal;  Surgeon: Socorro Lorenz MD;  [facility]  Service: [facility] Management;  Laterality: N/A;    CHOLECYSTECTOMY      COLONOSCOPY      COLONOSCOPY N/A 6/10/2021    Procedure: COLONOSCOPY;  Surgeon: Sheree Metz MD;  Location: [facility] ENDO;  Service: Endoscopy;  Laterality: N/A;    COLONOSCOPY N/A 3/8/2022    Procedure: COLONOSCOPY;  Surgeon: Maurilio Rodriguez MD;  Location: [facility] (2ND FLR);  Service: Endoscopy;  Laterality: N/A;  Pt to perform at-home COVID test morning of procedure-DS  2/24-Blood thinner approval rec'[facility] Dr. Walsh (see letters tab 2/24/22)-DS  3/2-Instructions sent via email-DS  3/7-Pt unable to come earlier due to ride-DS    CORONARY STENT PLACEMENT      CYSTOURETHROSCOPY N/A 11/13/2019    Procedure: CYSTOURETHROSCOPY;  Surgeon: Shun Nuñez MD;  Location: [facility] OR;  Service: Urology;  Laterality: N/A;    DIRECT DIAGNOSTIC LARYNGOSCOPY WITH BRONCHOSCOPY AND ESOPHAGOSCOPY N/A 1/3/2024    Procedure: LARYNGOSCOPY, DIRECT, DIAGNOSTIC, WITH BRONCHOSCOPY AND ESOPHAGOSCOPY;  Surgeon: Mir Belle MD;  Location: [facility] OR;  Service: ENT;  Laterality: N/A;    ENDOSCOPIC ULTRASOUND OF LOWER GASTROINTESTINAL TRACT N/A 3/8/2022    Procedure: ULTRASOUND, LOWER GI TRACT, ENDOSCOPIC;  Surgeon: Maurilio Rodriguez MD;  Location: [facility] (2ND [facility]);  Service: Endoscopy;  Laterality: N/A;  Pt to perform at-home COVID test morning of procedure-DS  2/24-Blood thinner approval rec'[facility] Dr. Walsh (see letters tab 2/24/22)-DS  3/2-Instructions sent via email-DS  3/7-Pt unable to come earlier due to ride-DS    ENDOSCOPIC ULTRASOUND OF UPPER GASTROINTESTINAL TRACT N/A 11/25/2019    Procedure: ULTRASOUND, UPPER GI TRACT, ENDOSCOPIC;  Surgeon: Alhaji Bridges MD;  Location: [facility] (2ND FLR);  Service: Endoscopy;  Laterality: N/A;  5 day hold Plavix, Dr Jovanni Serrano - pg  PM prep    ENDOSCOPIC ULTRASOUND OF UPPER GASTROINTESTINAL  TRACT N/A 11/2/2020    Procedure: ULTRASOUND, UPPER GI TRACT, ENDOSCOPIC;  Surgeon: Maurilio Rodriguez MD;  Location: [facility] ENDO (2ND FLR);  Service: Endoscopy;  Laterality: N/A;  5 day hold Plavix, Dr Roman Walsh - pg  Covid-19 test 10/30/20 at [facility]    EPIDURAL STEROID INJECTION INTO CERVICAL SPINE N/A 12/8/2021    Procedure: Injection-steroid-epidural-cervical;  Surgeon: Socorro Lorenz MD;  Location: [facility] OR;  Service: Pain Management;  Laterality: N/A;    EPIDURAL STEROID INJECTION INTO CERVICAL SPINE N/A 11/30/2023    Procedure: Injection-steroid-epidural-cervical;  Surgeon: Maurilio Carroll MD;  Location: [facility] OR;  Service: Anesthesiology;  Laterality: N/A;  c7-T1    ESOPHAGOGASTRODUODENOSCOPY N/A 6/10/2021    Procedure: EGD (ESOPHAGOGASTRODUODENOSCOPY);  Surgeon: Sheree Metz MD;  Location: [facility] ENDO;  Service: Endoscopy;  Laterality: N/A;    ESOPHAGOSCOPY, USING BOUGIE, WITH DILATION N/A 1/3/2024    Procedure: ESOPHAGOSCOPY, USING BOUGIE, WITH DILATION;  Surgeon: Mir Belle MD;  Location: [facility] OR;  Service: ENT;  Laterality: N/A;    FRACTURE SURGERY Left 05/02/2022    ankle    HERNIA REPAIR      HYSTERECTOMY      INCISIONAL HERNIA REPAIR      INJECTION OF ANESTHETIC AGENT AROUND NERVE Right 5/27/2019    Procedure: RIGHT L5-S3 MEDIAL BRANCH BLOCKS;  Surgeon: Sneha Aragon MD;  Location: [facility] OR;  Service: Pain Management;  Laterality: Right;  **DO NOT STOP PLAVIX**    INJECTION OF JOINT Right 9/22/2021    Procedure: Injection, Joint; Right SI Joint Injection;  Surgeon: Socorro Lorenz MD;  Location: [facility] OR;  Service: Pain Management;  Laterality: Right;  Oral    INSERTION OF DORSAL COLUMN NERVE STIMULATOR FOR TRIAL N/A 6/7/2021    Procedure: INSERTION, NEUROSTIMULATOR, SPINAL CORD, DORSAL COLUMN, FOR TRIAL;  Surgeon: Socorro Lorenz MD;  Location: [facility] OR;  Service: Pain Management;  Laterality: N/A;  nevro rep confirmed start time    instestine      bowel section    KNEE  "ARTHROPLASTY Right 2019    Procedure: ARTHROPLASTY, KNEE;  Surgeon: Ike Briceño II, MD;  Location: [facility] OR;  Service: Orthopedics;  Laterality: Right;    KNEE SURGERY      OOPHORECTOMY      PARATHYROID GLAND SURGERY      3 surgeries    RADIOFREQUENCY ABLATION Right 6/10/2019    Procedure: Radiofrequency Ablation - RIGHT L3-5 RADIOFREQUENCY ABLATION WITH HALYARD COOLIEF THERMAL SYSTEM;  Surgeon: Sneha Aragon MD;  Location: [facility] OR;  Service: Pain Management;  Laterality: Right;  **HOLD PLAVIX x 7 DAYS PRIOR**    SPINAL CORD STIMULATOR REMOVAL N/A 2024    Procedure: REMOVAL, NEUROSTIMULATOR, SPINAL;  Surgeon: Maurilio Carroll MD;  Location: [facility] OR;  Service: Pain Management;  Laterality: N/A;  removal of spinal cord stimulator    UPPER GASTROINTESTINAL ENDOSCOPY       Social History     Tobacco Use    Smoking status: Former     Current packs/day: 0.00     Average packs/day: 1 pack/day for 46.0 years (46.0 ttl pk-yrs)     Types: Cigarettes     Start date: 1977     Quit date: 2023     Years since quittin.3    Smokeless tobacco: Never   Substance Use Topics    Alcohol use: Not Currently    Drug use: No     Family History   Problem Relation Name Age of Onset    Stroke Maternal Grandmother      Cancer Maternal Grandfather      Stroke Mother      Heart disease Father      Breast cancer Sister       Review of patient's allergies indicates:   Allergen Reactions    Aspirin Other (See Comments)     "Makes stomach feel like it's on fire"    Phenergan [promethazine] Other (See Comments)     SEIZURES    Lortab [hydrocodone-acetaminophen] Itching     Able to take generic Norco          Review of Systems:  a review of eleven systems covering constitutional, Eye, HEENT, Psych, Respiratory, Cardiac, GI, , Musculoskeletal, Endocrine, Dermatologic was negative except for pertinent findings as listed ABOVE and below:  pertinent positives as above, rest good        Exam:Comprehensive exam done. /77 " "(BP Location: Right arm, Patient Position: Sitting, BP Method: Small (Automatic))   Pulse 75   Ht 5' 7" (1.702 m)   Wt 88.9 kg (195 lb 14.1 oz)   SpO2 (!) 94% Comment: on room air at rest  BMI 30.68 kg/m²   Exam included Vitals as listed, and patient's appearance and affect and alertness and mood, oral exam for yeast and hygiene and pharynx lesions and Mallapatti (M) score, neck with inspection for jvd and masses and thyroid abnormalities and lymph nodes (supraclavicular and infraclavicular nodes and axillary also examined and noted if abn), chest exam included symmetry and effort and fremitus and percussion and auscultation, cardiac exam included rhythm and gallops and murmur and rubs and jvd and edema, abdominal exam for mass and hepatosplenomegaly and tenderness and hernias and bowel sounds, Musculoskeletal exam with muscle tone and posture and mobility/gait and  strength, and skin for rashes and cyanosis and pallor and turgor, extremity for clubbing.  Findings were normal except for pertinent findings listed below:  M4, chest is symmetric, no distress, normal percussion, normal fremitus and good normal breath sounds          Radiographs (ct chest and cxr) reviewed: view by direct vision      Labs reviewed           PFT will be done and results to be reviewed       Plan:  Clinical impression is apparently straight forward and impression with management as below.     Alessia was seen today for pulmonary nodules.    Diagnoses and all orders for this visit:    Chronic obstructive pulmonary disease, unspecified COPD type  -     fluticasone-umeclidin-vilanter (TRELEGY ELLIPTA) 200-62.5-25 mcg inhaler; Inhale 1 puff into the lungs once daily.  -     Complete PFT without bronchodilator; Future    Bronchitis, chronic, mucopurulent  -     Culture, Respiratory with Gram Stain; Future  -     AFB Culture & Smear; Standing  -     NM PET CT FDG Skull Base to Mid Thigh; Future  -     CULTURE, FUNGUS    Abnormal CT lung " screening    Lung nodule  -     AFB Culture & Smear; Standing  -     NM PET CT FDG Skull Base to Mid Thigh; Future  -     CULTURE, FUNGUS        Follow up in about 4 weeks (around 5/29/2024), or if symptoms worsen or fail to improve.    Discussed with patient above for education the following:      Patient Instructions   Ct chest from 10/2023 showed vague density in right lower lung superior segment---- area worsened by ct 4/2024.  Would check pet scan but better after germ addressed...        You have chronic yellow mucous from lung and you have had aspiration problems...    You have mild calcified coronary arteries and history of stent.  You are to follow up with cardiology.    You benefitted from inahlers in past -- and have some short breath.  Use trelegy daily -- continue if helps.  Would check lung capacity    You had history c diff---doxycycline should be safe .     Culture, pet scan in 2 weeks, if pet scan shows abnormality bx-- if not culture/antibiotic or bronchoscopy with special biopsy???.    Would do biopsy if pet scan suggests need.    Will do phone report pet, notify for significant culture result, dose antibiotic if needed....                 Marcelino took65 min

## 2024-05-08 ENCOUNTER — TELEPHONE (OUTPATIENT)
Dept: PULMONOLOGY | Facility: CLINIC | Age: 69
End: 2024-05-08
Payer: MEDICARE

## 2024-05-08 ENCOUNTER — TELEPHONE (OUTPATIENT)
Dept: FAMILY MEDICINE | Facility: CLINIC | Age: 69
End: 2024-05-08
Payer: MEDICARE

## 2024-05-08 NOTE — TELEPHONE ENCOUNTER
Spoke with pt. Pt reports hernia repair in the past, stomach pain present.  Offered appt with NP tomorrow, pt denied. Appt scheduled with MD Friday 5/10/24. Pt voiced understanding

## 2024-05-08 NOTE — TELEPHONE ENCOUNTER
----- Message from Barbara Mullen sent at 5/8/2024 10:01 AM CDT -----  Contact: PT  Type:  Sooner Apoointment Request    Caller is requesting a sooner appointment.  Caller declined first available appointment listed below.  Caller will not accept being placed on the waitlist and is requesting a message be sent to doctor.  Name of Caller:PT   When is the first available appointment?N/A  Symptoms: STOMACH BULDING AND KNOT ON HER SIDE. PT STATED THAT MESH HAS TORN APART INSIDE OF HER  Would the patient rather a call back or a response via [facility]? CALL   Best Call Back Number:[phone] (home)   Additional Information: THANK YOU

## 2024-05-08 NOTE — TELEPHONE ENCOUNTER
Spoke to patient.  I moved her PFT to the day she had her PET scan so she only has to drive in once.  Verbalized understanding.

## 2024-05-08 NOTE — TELEPHONE ENCOUNTER
----- Message from Bebeto Quiroz sent at 5/8/2024 10:24 AM CDT -----  Type: Needs Medical Advice  Who Called:  pt  Symptoms (please be specific):  pt said she need to speak to the nurse--she told her to call this week and let her know about her appt--said she need to let her know she have not heard from the breathing test but the PET scan has been schedules--please call and advise  Best Call Back Number: [phone]   Additional Information: thank you

## 2024-05-10 ENCOUNTER — OFFICE VISIT (OUTPATIENT)
Dept: FAMILY MEDICINE | Facility: CLINIC | Age: 69
End: 2024-05-10
Payer: MEDICARE

## 2024-05-10 VITALS
OXYGEN SATURATION: 99 % | BODY MASS INDEX: 30.35 KG/M2 | HEIGHT: 67 IN | SYSTOLIC BLOOD PRESSURE: 128 MMHG | WEIGHT: 193.38 LBS | DIASTOLIC BLOOD PRESSURE: 78 MMHG | RESPIRATION RATE: 16 BRPM | HEART RATE: 54 BPM

## 2024-05-10 DIAGNOSIS — Z12.39 ENCOUNTER FOR SCREENING BREAST EXAMINATION: ICD-10-CM

## 2024-05-10 DIAGNOSIS — K43.9 VENTRAL HERNIA WITHOUT OBSTRUCTION OR GANGRENE: ICD-10-CM

## 2024-05-10 DIAGNOSIS — K86.1 CHRONIC PANCREATITIS, UNSPECIFIED PANCREATITIS TYPE: ICD-10-CM

## 2024-05-10 DIAGNOSIS — I70.0 AORTIC ATHEROSCLEROSIS: ICD-10-CM

## 2024-05-10 DIAGNOSIS — R10.84 GENERALIZED ABDOMINAL PAIN: ICD-10-CM

## 2024-05-10 DIAGNOSIS — L85.3 DRY SKIN: ICD-10-CM

## 2024-05-10 DIAGNOSIS — E31.20 MEN (MULTIPLE ENDOCRINE NEOPLASIA): ICD-10-CM

## 2024-05-10 DIAGNOSIS — M51.36 DEGENERATIVE DISC DISEASE, LUMBAR: ICD-10-CM

## 2024-05-10 DIAGNOSIS — N18.32 STAGE 3B CHRONIC KIDNEY DISEASE: ICD-10-CM

## 2024-05-10 DIAGNOSIS — F32.1 MAJOR DEPRESSIVE DISORDER, SINGLE EPISODE, MODERATE: ICD-10-CM

## 2024-05-10 DIAGNOSIS — Z12.31 ENCOUNTER FOR SCREENING MAMMOGRAM FOR BREAST CANCER: ICD-10-CM

## 2024-05-10 DIAGNOSIS — R11.0 NAUSEA: ICD-10-CM

## 2024-05-10 DIAGNOSIS — I50.22 CHRONIC SYSTOLIC CONGESTIVE HEART FAILURE: ICD-10-CM

## 2024-05-10 DIAGNOSIS — Z12.31 SCREENING MAMMOGRAM FOR BREAST CANCER: Primary | ICD-10-CM

## 2024-05-10 DIAGNOSIS — F33.0 MILD EPISODE OF RECURRENT MAJOR DEPRESSIVE DISORDER: ICD-10-CM

## 2024-05-10 DIAGNOSIS — M46.1 SACROILIITIS: ICD-10-CM

## 2024-05-10 PROCEDURE — 99999 PR PBB SHADOW E&M-EST. PATIENT-LVL V: CPT | Mod: PBBFAC,,, | Performed by: STUDENT IN AN ORGANIZED HEALTH CARE EDUCATION/TRAINING PROGRAM

## 2024-05-10 PROCEDURE — 99214 OFFICE O/P EST MOD 30 MIN: CPT | Mod: S$PBB,,, | Performed by: STUDENT IN AN ORGANIZED HEALTH CARE EDUCATION/TRAINING PROGRAM

## 2024-05-10 PROCEDURE — 99215 OFFICE O/P EST HI 40 MIN: CPT | Mod: PBBFAC,PN | Performed by: STUDENT IN AN ORGANIZED HEALTH CARE EDUCATION/TRAINING PROGRAM

## 2024-05-10 RX ORDER — ONDANSETRON 4 MG/1
4 TABLET, ORALLY DISINTEGRATING ORAL EVERY 8 HOURS PRN
Qty: 90 TABLET | Refills: 3 | Status: SHIPPED | OUTPATIENT
Start: 2024-05-10

## 2024-05-10 NOTE — PROGRESS NOTES
Subjective:       Patient ID: Alessia Nelson is a 68 y.o. female.    Chief Complaint: Abdominal Pain (Had mesh put in 12 yrs ago. Pt believe the mesh is coming undone.)    She has had some chronic abdominal pain and previous history of shift in her mesh from hernia surgery before  She has been having worsening abdominal pain over months but especially in the last month.  She is having some abdominal bloating, tenderness and having some tenderness and deep bruise on there right lateral side.  She has had some new onset constipation in setting of chronic diarrhea.  She has been having increase in weight over the last few months    Wt Readings from Last 15 Encounters:  05/10/24 : 87.7 kg (193 lb 6.4 oz)  05/01/24 : 88.9 kg (195 lb 14.1 oz)  04/15/24 : 87.1 kg (192 lb)  03/27/24 : 87.5 kg (192 lb 14.4 oz)  03/04/24 : 87.5 kg (193 lb)  02/21/24 : 87.5 kg (193 lb)  02/06/24 : 87.5 kg (193 lb)  01/29/24 : 85.7 kg (189 lb)  01/22/24 : 85.7 kg (189 lb)  01/03/24 : 85.7 kg (189 lb)  11/27/23 : 85.6 kg (188 lb 11.4 oz)  11/15/23 : 85.6 kg (188 lb 9.7 oz)  10/20/23 : 85.7 kg (188 lb 14.4 oz)  10/02/23 : 82.3 kg (181 lb 8 oz)  09/15/23 : 82.7 kg (182 lb 6.4 oz)    Last CT in 2023  Lung bases are clear. Heart size normal.  Coronary artery disease.  Mild dilation common bile duct at 8 mm.  This is unchanged and presumably reservoir effect post cholecystectomy.     Simple cyst lower pole cortex right kidney.  Pancreas atrophy.  No pancreas ductal dilation calcification mass or abnormal enhancement.  Remaining solid abdominal organs are unremarkable.     There is enteric contrast.  Stomach is mildly dilated.  Remaining bowel loops are normal in caliber.  Appendectomy.  There is submucosal fat deposition involving long segments of the right colon which is nonspecific but can indicate chronic inflammation.  There is apparent wall thickening along the sigmoid colon, without adjacent fat stranding.     Atherosclerosis.  Small fat  containing midline ventral hernia above the umbilicus with fascial defect 1.6 cm.  Hysterectomy.  Urinary bladder unremarkable.     Neurostimulator.  Fusion hardware L4-L5 with posterior decompression.     Impression:     1. Unremarkable assessment of the pancreas.  2. Apparent wall thickening along the sigmoid colon.  This could relate to inadequate distension.  Mild or early infectious/inflammatory colitis are also possible in the appropriate clinical setting.    CT in 2021  Calcific plaque noted in coronary arteries.     Gallbladder surgically absent.  Liver, spleen, adrenal glands, kidneys are normal in appearance, with incidental small right lower pole simple renal cyst.  Pancreas somewhat atrophic in appearance but otherwise normal and unchanged.     There is focal dilatation of the abdominal aorta just inferior to the origin of the renal arteries.  This appears smaller on today's contrast enhanced images compared to the prior study with a maximum diameter of 2.7 cm.  There is extensive calcific plaque formation in the aorta and its branches.     Urinary bladder unremarkable.  Uterus surgically absent.     There is no bowel obstruction.  There is no inflammation in the bowel.  Fat deposition in the wall of the colon described previously is not as evident currently, but there are multiple lipomata in the colon measuring up to 4.5 cm in size, with the largest noted around the hepatic flexure.     There is no ascites or adenopathy.     There are several small midline fat containing ventral hernias in the upper abdomen.     The patient is status post previous discectomy, laminectomy and posterior fusion in the lumbar spine.  There is a spinal stimulator.     Impression:     No acute abnormality.     Status post cholecystectomy, hysterectomy, lumbar spine surgery.     Atherosclerosis.  Focal ectasia abdominal aorta.     Multiple lipomas in the colon.     Fat containing ventral hernias.              .  Patient  Active Problem List   Diagnosis    CHF, chronic    HTN (hypertension), onset in her 20s    COPD (chronic obstructive pulmonary disease)    Nicotine dependence    Diarrhea, chronic    Allergic rhinitis    Mild episode of recurrent major depressive disorder    Fibromyalgia    GERD (gastroesophageal reflux disease)    Hypothyroidism    Hyperlipidemia    History of malignant neoplasm of skin    MEN (multiple endocrine neoplasia)    Peripheral neuropathy    Poikiloderma    Pseudophakia    Rotator cuff injury    Tendinosis    Metatarsalgia    Dry eyes    Class 1 obesity due to excess calories with serious comorbidity and body mass index (BMI) of 30.0 to 30.9 in adult, today 29.1    Chronic bilateral low back pain without sciatica    Contusion of tail of pancreas    Bile duct abnormality    Lumbosacral spondylosis    S/P drug eluting coronary stent placement, 8/2017    Family history of premature CAD    Syncope and collapse, onset 2015, about 10 times, one episode noted hypotension at doctor office 9/2017    Multiple falls, started 9/2018, 5 times usually on standing    Excessive caffeine intake    Orthostatic hypotension    Abdominal obesity    Aspirin intolerance    LVH (left ventricular hypertrophy) due to hypertensive disease, with heart failure    History of cholecystectomy    Diverticulosis of large intestine without hemorrhage    Abnormal computerized tomography of biliary tract    History of pancreatitis    Primary osteoarthritis of right knee    Degenerative tear of medial meniscus of right knee    Baker's cyst of knee, right    Chronic pain of right knee    Unilateral primary osteoarthritis, right knee    CAD (coronary artery disease)    History of TIA (transient ischemic attack)    Status post right knee replacement    Anemia    Hypomagnesemia    Bradycardia    Chronic pancreatitis    Colon cancer screening    B12 deficiency    History of Clostridium difficile infection    Abnormal finding on GI tract imaging     Pancreatic insufficiency    Degenerative disc disease, lumbar    Current smoker    Failed back syndrome of lumbar spine    Chronic cervical pain    Activity intolerance    Sacroiliitis    Hypothyroidism due to acquired atrophy of thyroid    Pain of lumbar facet joint    Non-refractory chronic migraine without aura    Lumbar radiculopathy    Isolated cervical dystonia    Gout    Generalized anxiety disorder    Stage 3b chronic kidney disease    Pharyngoesophageal dysphagia    Hoarseness, persistent    Chronic bronchitis    Bilateral carpal tunnel syndrome    Major depressive disorder, single episode, moderate    Aortic atherosclerosis     Alessia has a current medication list which includes the following prescription(s): acetaminophen, allopurinol, arexvy (pf), azelastine, clopidogrel, colestipol, cyanocobalamin, escitalopram oxalate, fluticasone propionate, furosemide, gabapentin, levothyroxine, magnesium oxide, methocarbamol, metoprolol succinate, mupirocin, pantoprazole, potassium chloride sa, rosuvastatin, trazodone, zenpep, losartan, and ondansetron, and the following Facility-Administered Medications: electrolyte-s (ph 7.4), fentanyl, hydromorphone, lactated ringers, lactated ringers, lactated ringers, lactated ringers, lactated ringers, lidocaine (pf) 10 mg/ml (1%), lidocaine (pf) 10 mg/ml (1%), lorazepam, oxycodone, prochlorperazine, and sodium chloride 0.9%.    Review of Systems   Constitutional:  Negative for activity change and appetite change.   Respiratory:  Negative for shortness of breath.    Cardiovascular:  Negative for chest pain.   Gastrointestinal:  Positive for abdominal distention, abdominal pain and change in bowel habit.   Genitourinary:  Negative for dysuria.   Integumentary:  Negative for rash.   Psychiatric/Behavioral:  Negative for dysphoric mood and sleep disturbance. The patient is not nervous/anxious.          Health Maintenance Due   Topic Date Due    Annual UACr  Never done     Hemoglobin A1c (Diabetic Prevention Screening)  Never done    Shingles Vaccine (1 of 2) Never done    RSV Vaccine (Age 60+ and Pregnant patients) (1 - 1-dose 60+ series) Never done    Mammogram  03/10/2022    Pneumococcal Vaccines (Age 65+) (2 of 2 - PCV) 06/01/2023    COVID-19 Vaccine (4 - 2023-24 season) 09/01/2023      Health Maintenance reviewed and discussed- mammogram ordered, patient to schedule appointment, encouraged vaccines.     Objective:      Physical Exam  Constitutional:       General: She is not in acute distress.     Appearance: Normal appearance. She is not ill-appearing.   Eyes:      Conjunctiva/sclera: Conjunctivae normal.   Cardiovascular:      Rate and Rhythm: Normal rate and regular rhythm.      Heart sounds: Normal heart sounds. No murmur heard.  Pulmonary:      Effort: Pulmonary effort is normal. No respiratory distress.      Breath sounds: Normal breath sounds. No wheezing, rhonchi or rales.   Abdominal:      General: There is distension.      Palpations: Abdomen is soft. There is no mass.      Tenderness: There is no abdominal tenderness. There is no guarding or rebound.      Hernia: A hernia is present.   Musculoskeletal:      Right lower leg: No edema.      Left lower leg: No edema.   Lymphadenopathy:      Cervical: No cervical adenopathy.   Skin:     General: Skin is warm and dry.      Comments: Dry skin with flaking on leg and shiny skin   Neurological:      Mental Status: She is alert. Mental status is at baseline.      Gait: Gait normal.   Psychiatric:         Mood and Affect: Mood normal.         Behavior: Behavior normal.         Thought Content: Thought content normal.         Judgment: Judgment normal.         Assessment:       1. Degenerative disc disease, lumbar    2. Nausea    3. Major depressive disorder, single episode, moderate    4. Aortic atherosclerosis    5. MEN (multiple endocrine neoplasia)    6. Chronic pancreatitis, unspecified pancreatitis type    7. Chronic  systolic congestive heart failure    8. Stage 3b chronic kidney disease    9. Sacroiliitis    10. Mild episode of recurrent major depressive disorder    11. Generalized abdominal pain    12. Ventral hernia without obstruction or gangrene    13. Dry skin        Plan:       1. Degenerative disc disease, lumbar  Assessment & Plan:  Continued back pain  Seeing pain management.      2. Nausea  -     ondansetron (ZOFRAN-ODT) 4 MG TbDL; Take 1 tablet (4 mg total) by mouth every 8 (eight) hours as needed (nausea).  Dispense: 90 tablet; Refill: 3    3. Major depressive disorder, single episode, moderate  Assessment & Plan:  Stable. Continue current medications and regular followup.        4. Aortic atherosclerosis  Assessment & Plan:  Stable. Continue current medications and regular followup.  Continue risk factor management      Orders:  -     US Ankle Brachial Indices Ext LTD WO Str; Future; Expected date: 05/10/2024    5. MEN (multiple endocrine neoplasia)  Assessment & Plan:  Stable. Continue current medications and regular followup.        6. Chronic pancreatitis, unspecified pancreatitis type  Assessment & Plan:  Stable. Continue current medications and regular followup.  Keep GI followup      7. Chronic systolic congestive heart failure  Assessment & Plan:  Stable. Continue current medications and regular followup.        8. Stage 3b chronic kidney disease  Assessment & Plan:  BMP  Lab Results   Component Value Date     01/02/2024    K 4.3 01/02/2024     01/02/2024    CO2 24 01/02/2024    BUN 20 01/02/2024    CREATININE 1.0 01/02/2024    CALCIUM 11.2 (H) 01/02/2024    ANIONGAP 11 01/02/2024    EGFRNORACEVR >60.0 01/02/2024     Stable. Continue current medications and regular followup.      Orders:  -     Creatinine, serum; Future; Expected date: 05/10/2024    9. Sacroiliitis  Assessment & Plan:  Continue pain management followup  Stable. Continue current medications and regular followup.        10. Mild  episode of recurrent major depressive disorder  Assessment & Plan:  Stable. Continue current medications and regular followup.        11. Generalized abdominal pain  -     CT Abdomen Pelvis With IV Contrast Routine Oral Contrast; Future; Expected date: 05/10/2024    12. Ventral hernia without obstruction or gangrene  -     CT Abdomen Pelvis With IV Contrast Routine Oral Contrast; Future; Expected date: 05/10/2024    13. Dry skin  -     Ambulatory referral/consult to Dermatology; Future; Expected date: 05/17/2024

## 2024-05-10 NOTE — ASSESSMENT & PLAN NOTE
BMP  Lab Results   Component Value Date     01/02/2024    K 4.3 01/02/2024     01/02/2024    CO2 24 01/02/2024    BUN 20 01/02/2024    CREATININE 1.0 01/02/2024    CALCIUM 11.2 (H) 01/02/2024    ANIONGAP 11 01/02/2024    EGFRNORACEVR >60.0 01/02/2024     Stable. Continue current medications and regular followup.

## 2024-05-12 DIAGNOSIS — M10.9 GOUT, UNSPECIFIED CAUSE, UNSPECIFIED CHRONICITY, UNSPECIFIED SITE: ICD-10-CM

## 2024-05-13 RX ORDER — ALLOPURINOL 300 MG/1
300 TABLET ORAL
Qty: 90 TABLET | Refills: 0 | Status: SHIPPED | OUTPATIENT
Start: 2024-05-13

## 2024-05-13 NOTE — TELEPHONE ENCOUNTER
Care Due:                  Date            Visit Type   Department     Provider  --------------------------------------------------------------------------------                                EP -                              PRIMARY      [facility] FAMILY  Last Visit: 05-      CARE ([facility])   MEDICINE       Luana Calvert                               -                              PRIMARY      [facility] FAMILY  Next Visit: 08-      CARE ([facility])   [facility]       Luana Calvert                                                            Last  Test          Frequency    Reason                     Performed    Due Date  --------------------------------------------------------------------------------    Uric Acid...  12 months..  allopurinoL..............  Not Found    Overdue    [employer] Embedded Care Due Messages. Reference number: [id number].   5/12/2024 11:20:38 PM CDT

## 2024-05-13 NOTE — TELEPHONE ENCOUNTER
Refill Routing Note   Medication(s) are not appropriate for processing by [facility] for the following reason(s):        Required labs outdated    [facility] action(s):  Defer     Requires labs : Yes             Appointments  past 12m or future 3m with PCP    Date Provider   Last Visit   Visit date not found Jailene Rojas MD   Next Visit   Visit date not found Jailene Rojas MD   ED visits in past 90 days: 0        Note composed:8:19 AM 05/13/2024

## 2024-05-15 ENCOUNTER — OFFICE VISIT (OUTPATIENT)
Dept: CARDIOLOGY | Facility: CLINIC | Age: 69
End: 2024-05-15
Payer: MEDICARE

## 2024-05-15 DIAGNOSIS — I10 PRIMARY HYPERTENSION: ICD-10-CM

## 2024-05-15 DIAGNOSIS — I25.10 ATHEROSCLEROSIS OF NATIVE CORONARY ARTERY OF NATIVE HEART WITHOUT ANGINA PECTORIS: Primary | ICD-10-CM

## 2024-05-15 PROCEDURE — 99214 OFFICE O/P EST MOD 30 MIN: CPT | Mod: S$PBB,,, | Performed by: STUDENT IN AN ORGANIZED HEALTH CARE EDUCATION/TRAINING PROGRAM

## 2024-05-15 PROCEDURE — 99999 PR PBB SHADOW E&M-EST. PATIENT-LVL V: CPT | Mod: PBBFAC,,, | Performed by: STUDENT IN AN ORGANIZED HEALTH CARE EDUCATION/TRAINING PROGRAM

## 2024-05-15 PROCEDURE — 99215 OFFICE O/P EST HI 40 MIN: CPT | Mod: PBBFAC,PN | Performed by: STUDENT IN AN ORGANIZED HEALTH CARE EDUCATION/TRAINING PROGRAM

## 2024-05-17 ENCOUNTER — TELEPHONE (OUTPATIENT)
Dept: PAIN MEDICINE | Facility: CLINIC | Age: 69
End: 2024-05-17
Payer: MEDICARE

## 2024-05-17 DIAGNOSIS — M96.1 POSTLAMINECTOMY SYNDROME OF LUMBAR REGION: ICD-10-CM

## 2024-05-17 DIAGNOSIS — M51.36 DDD (DEGENERATIVE DISC DISEASE), LUMBAR: ICD-10-CM

## 2024-05-17 DIAGNOSIS — M50.30 DDD (DEGENERATIVE DISC DISEASE), CERVICAL: Primary | ICD-10-CM

## 2024-05-17 RX ORDER — LEVOTHYROXINE SODIUM 75 UG/1
TABLET ORAL
Qty: 90 TABLET | Refills: 3 | Status: SHIPPED | OUTPATIENT
Start: 2024-05-17

## 2024-05-17 NOTE — TELEPHONE ENCOUNTER
No care due was identified.  [facility] Embedded Care Due Messages. Reference number: [id number].   5/16/2024 11:17:13 PM CDT

## 2024-05-17 NOTE — TELEPHONE ENCOUNTER
----- Message from Barbara Mullen sent at 5/17/2024  8:15 AM CDT -----  Contact: PT  Type:  Patient Requesting Referral    Who Called:PT   Does the patient already have the specialty appointment scheduled?: NO  If yes, what is the date of that appointment?:N/A  Referral to What Specialty:PHYSICAL THERAPY   Reason for Referral:NECK AND LOWER BACK PAIN  - INCLUDING DRY NEEDLE AND MASSAGE  Does the patient want the referral with a specific physician?:YES - ERINN RIVERA   [facility] the [facility]   Patient Requesting a Response?:YES   Would the patient rather a call back or a response via [facility]? CALL   Best Call Back Number:[phone] (home)   Additional Information: THANK YOU

## 2024-05-17 NOTE — TELEPHONE ENCOUNTER
Refill Routing Note   Medication(s) are not appropriate for processing by [facility] for the following reason(s):        Non-participating provider    [facility] action(s):  Route             Appointments  past 12m or future 3m with PCP    Date Provider   Last Visit   5/10/2024 Luana Calvert MD   Next Visit   8/12/2024 Luana Calvert MD   ED visits in past 90 days: 0        Note composed:12:41 PM 05/17/2024

## 2024-05-20 ENCOUNTER — HOSPITAL ENCOUNTER (OUTPATIENT)
Dept: RADIOLOGY | Facility: HOSPITAL | Age: 69
Discharge: HOME | End: 2024-05-20
Attending: STUDENT IN AN ORGANIZED HEALTH CARE EDUCATION/TRAINING PROGRAM
Payer: MEDICARE

## 2024-05-20 DIAGNOSIS — R10.84 GENERALIZED ABDOMINAL PAIN: ICD-10-CM

## 2024-05-20 DIAGNOSIS — K43.9 VENTRAL HERNIA WITHOUT OBSTRUCTION OR GANGRENE: ICD-10-CM

## 2024-05-20 DIAGNOSIS — I70.0 AORTIC ATHEROSCLEROSIS: ICD-10-CM

## 2024-05-20 PROCEDURE — 93922 UPR/L XTREMITY ART 2 LEVELS: CPT | Mod: 26,,, | Performed by: RADIOLOGY

## 2024-05-20 PROCEDURE — 74177 CT ABD & PELVIS W/CONTRAST: CPT | Mod: TC

## 2024-05-20 PROCEDURE — 25500020 PHARM REV CODE 255: Performed by: STUDENT IN AN ORGANIZED HEALTH CARE EDUCATION/TRAINING PROGRAM

## 2024-05-20 PROCEDURE — 93922 UPR/L XTREMITY ART 2 LEVELS: CPT | Mod: TC

## 2024-05-20 PROCEDURE — A9698 NON-RAD CONTRAST MATERIALNOC: HCPCS | Performed by: STUDENT IN AN ORGANIZED HEALTH CARE EDUCATION/TRAINING PROGRAM

## 2024-05-20 PROCEDURE — 74177 CT ABD & PELVIS W/CONTRAST: CPT | Mod: 26,,, | Performed by: RADIOLOGY

## 2024-05-20 RX ADMIN — IOHEXOL 100 ML: 350 INJECTION, SOLUTION INTRAVENOUS at 12:05

## 2024-05-20 RX ADMIN — IOHEXOL 1000 ML: 9 SOLUTION ORAL at 12:05

## 2024-05-21 ENCOUNTER — CLINICAL SUPPORT (OUTPATIENT)
Dept: REHABILITATION | Facility: HOSPITAL | Age: 69
End: 2024-05-21
Payer: MEDICARE

## 2024-05-21 DIAGNOSIS — E53.8 B12 DEFICIENCY: ICD-10-CM

## 2024-05-21 DIAGNOSIS — M50.30 DDD (DEGENERATIVE DISC DISEASE), CERVICAL: ICD-10-CM

## 2024-05-21 DIAGNOSIS — Z74.09 IMPAIRED FUNCTIONAL MOBILITY AND ACTIVITY TOLERANCE: Primary | ICD-10-CM

## 2024-05-21 DIAGNOSIS — M51.36 DDD (DEGENERATIVE DISC DISEASE), LUMBAR: ICD-10-CM

## 2024-05-21 DIAGNOSIS — M96.1 POSTLAMINECTOMY SYNDROME OF LUMBAR REGION: ICD-10-CM

## 2024-05-21 PROCEDURE — 97161 PT EVAL LOW COMPLEX 20 MIN: CPT | Mod: PN

## 2024-05-21 PROCEDURE — 97140 MANUAL THERAPY 1/> REGIONS: CPT | Mod: PN

## 2024-05-21 NOTE — PLAN OF CARE
[facility]   Physical Therapy Initial Evaluation      Name: Alessia Nelson  Clinic Number: [id number]    Therapy Diagnosis:   Encounter Diagnoses   Name Primary?    DDD (degenerative disc disease), lumbar     Postlaminectomy syndrome of lumbar region     DDD (degenerative disc disease), cervical         Physician: Laura Heath, NP    Physician Orders: PT Eval and Treat   Medical Diagnosis from Referral:   M51.36 (ICD-10-CM) - DDD (degenerative disc disease), lumbar   M96.1 (ICD-10-CM) - Postlaminectomy syndrome of lumbar region   M50.30 (ICD-10-CM) - DDD (degenerative disc disease), cervical     Evaluation Date: 5/21/2024  Authorization Period Expiration: 12/31/2024  Plan of Care Expiration: 8/21/2024  Progress Note Due: 6/21/2024  Date of Surgery: n/a   Visit # / Visits authorized: 1/ 1   FOTO: 1/ 3    Precautions: Standard     Time In: 1:20 pm   Time Out: 2:30   Total Billable Time: 60 minutes    Subjective     Date of onset: past 6 months - pain has gotten worse in neck and back     History of current condition - Alessia reports: she has had a lot going on in the past several months; currently dealing with the possibility of lung CA.  She is having a PET Scan tomorrow and is going to [facility] in two weeks for further testing; She has had increased neck and back pain as a result of stress, healthcare issues and requested referral to PT for dry needling and manual treatment to improve her pain and quality of life.     Falls: no falls     Imaging: CT scan films:  cervical and lumbar spine in February 2024     Prior Therapy: Yes - has been here in the past for chronic neck/back pain - last seen about 3 years ago.   Social History: lives alone, has pool at home that she uses often to assist with reduced pain mobility   Occupation: retired   Prior Level of Function: Independent, but reduced as result of health issues   Current Level of Function: recently got new bed to help with lower  back pain     Pain:  Current 6 - 8/10, worst 9/10, best 6/10  - neck and back - lower back being a little worse than her back   Location: right  LE radicular symptoms from lumbar spine; Cervical - both arms feel heavy, diagnosed with carpal tunnel in bilateral hands;   Description: Aching, Grabbing, Tight, Tingling, and Numb  Aggravating Factors: Standing, Bending, Walking, Night Time, and Lifting  Easing Factors:  has a salt water pool that she uses to help with pain     Patients goals: To reduce pain and improve cervical/lumbar ROM      Medical History:   Past Medical History:   Diagnosis Date    Acute pancreatitis     Back pain     CAD (coronary artery disease)     CHF (congestive heart failure)     COPD (chronic obstructive pulmonary disease)     Depression     Diverticulosis     Encounter for blood transfusion     GERD (gastroesophageal reflux disease)     History of blood clots     1986 AFTER BOWEL RESCETION    History of bowel resection     Hyperlipidemia     Hypertension     Peritonitis     Seizures     HAD A SEIZURE FROM PHENERGAN     Stroke     Thyroid disease     TIA (transient ischemic attack)     Wears dentures     upper       Surgical History:   Alessia Nelson  has a past surgical history that includes Parathyroid gland surgery; instestine; Hernia repair; Hysterectomy; Cholecystectomy; Adrenal gland surgery; Colonoscopy; Upper gastrointestinal endoscopy; Incisional hernia repair; Injection of anesthetic agent around nerve (Right, 5/27/2019); Radiofrequency ablation (Right, 6/10/2019); Coronary stent placement; Appendectomy; Back surgery; Cystourethroscopy (N/A, 11/13/2019); Endoscopic ultrasound of upper gastrointestinal tract (N/A, 11/25/2019); Knee surgery (1983); Knee Arthroplasty (Right, 12/18/2019); Endoscopic ultrasound of upper gastrointestinal tract (N/A, 11/2/2020); Oophorectomy; Caudal epidural steroid injection (N/A, 3/1/2021); Insertion of dorsal column nerve stimulator for trial (N/A,  "6/7/2021); Esophagogastroduodenoscopy (N/A, 6/10/2021); Colonoscopy (N/A, 6/10/2021); Injection of joint (Right, 9/22/2021); Epidural steroid injection into cervical spine (N/A, 12/8/2021); Endoscopic ultrasound of lower gastrointestinal tract (N/A, 3/8/2022); Colonoscopy (N/A, 3/8/2022); Fracture surgery (Left, 05/02/2022); Epidural steroid injection into cervical spine (N/A, 11/30/2023); Direct diagnostic laryngoscopy with bronchoscopy and esophagoscopy (N/A, 1/3/2024); esophagoscopy, using bougie, with dilation (N/A, 1/3/2024); and Spinal cord stimulator removal (N/A, 2/21/2024).    Medications:   Alessia has a current medication list which includes the following prescription(s): acetaminophen, allopurinol, arexvy (pf), azelastine, clopidogrel, colestipol, cyanocobalamin, escitalopram oxalate, fluticasone propionate, furosemide, gabapentin, levothyroxine, magnesium oxide, methocarbamol, metoprolol succinate, mupirocin, ondansetron, pantoprazole, potassium chloride sa, rosuvastatin, trazodone, and zenpep, and the following Facility-Administered Medications: electrolyte-s (ph 7.4), fentanyl, hydromorphone, lactated ringers, lactated ringers, lactated ringers, lactated ringers, lactated ringers, lidocaine (pf) 10 mg/ml (1%), lidocaine (pf) 10 mg/ml (1%), lorazepam, oxycodone, prochlorperazine, and sodium chloride 0.9%.    Allergies:   Review of patient's allergies indicates:   Allergen Reactions    Aspirin Other (See Comments)     "Makes stomach feel like it's on fire"    Phenergan [promethazine] Other (See Comments)     SEIZURES    Lortab [hydrocodone-acetaminophen] Itching     Able to take generic Norco        Objective      Posture: Standing: Pelvis and shoulders are level, increased thoracic kyphosis, moderate rounded shoulder and forward head posture.  Sitting: Decreased lumbar lordosis, increased thoracic kyphosis, moderate rounded shoulder and forward head posture.    Palpation: Increased tenderness B " "suboccipitals, B cervical paraspinals, B upper traps/levator scapulae with increased muscle tension also present .    Sensation: Reports pre-existing paresthesias in B hands but this has worsened since this episode began.    DTRs:  BJ +1 and symmetrical, TJ and BRJ 0 and symmetrical    Range of Motion/Strength:    Cervical A/PROM: Pain/Dysfunction with Movement:   Flexion                         38*/48*  "My neck was grinding a little"   Extension                    25*/32*     Right side bending      20*/25*     Left side bending        15*/25*     Right rotation              30*/35*     Left rotation                30*/35*     B UE              ROM grossly WFL              Strength grossly 4/5-4+/5  Flexibility: Significant limitation in B Upper traps, levator scap, pectoralis mm  Gait: Without AD  Analysis: Slow kevin.   Bed Mobility:Independent  Increased UE support  Transfers: Independent  Special Tests: Axial compression/distraction (-); alar ligament (-), Sharp Rodolfo (-)      PT Evaluation Completed? Yes  Discussed Plan of Care with patient: Yes    Intake Outcome Measure for FOTO cervical  Survey    Therapist reviewed FOTO scores for Alessia Nelson on 5/21/2024.   FOTO report - see Media section or FOTO account episode details.    Intake Score: %         Treatment     Total Treatment time (time-based codes) separate from Evaluation: 20 minutes     Alessia received the treatments listed below:      manual therapy techniques: Soft tissue Mobilization and Functional dry needling  were applied to the: posterior cervical spine and left periscapular area for 20 minutes, including:  Patient signed consent for Functional Dry Needling and scanned into MEDIA section of chart; Positioned in prone with proper support for head, hips and ankles.  LF-ES per cervical radiculopathy protocol and rhomboid protocol utilizing 30 mm and 40 mm needles for posterior cervical paraspinals, right levator and right upper trap; left " levator and rhomboids needling back to scapula. Clockwise needling winding for increased tissue grasp. Intensity of stimulation adjusted to patient tolerance.  Good rhythmical contraction of tissue noted.  Needles left in-sit x 15 mins.  Removed without adverse effects.   Patient reminded of need to increase water intake next 24 hrs; as well as to expect soreness later this evening - she is familiar with the dry needling process.   Patient Education and Home Exercises     Education provided:   - Role of PT; POC     Written Home Exercises Provided: patient will be given HEP over next few sessions prior to her trip to [facility] .  Alessia demonstrated good  understanding of the education provided. See EMR under Patient Instructions for exercises provided during therapy sessions.    Assessment     Alessia is a 68 y.o. female referred to outpatient Physical Therapy with a medical diagnosis of cervical/lumbar DDD, post laminectomy syndrome . Patient presents with chronic cervical and lumbar pain that has become exacerbated secondary to recent health issues.  She reports that she has quit smoking; currently undergoing evaluation for possible lung CA - PET scan tomorrow.  Requested referral to PT for some  dry needling and manual tx prior to her further testing and trip to [place]. Patient will benefit from Skilled physical therapy intervention to address exacerbated pain symptoms and restricted AROM.     Patient prognosis is Fair.   Patient will benefit from skilled outpatient Physical Therapy to address the deficits stated above and in the chart below, provide patient /family education, and to maximize patientt's level of independence.     Plan of care discussed with patient: Yes  Patient's spiritual, cultural and educational needs considered and patient is agreeable to the plan of care and goals as stated below:     Anticipated Barriers for therapy: none     Medical Necessity is demonstrated by the  following  History  Co-morbidities and personal factors that may impact the plan of care [x] LOW: no personal factors / co-morbidities  [] MODERATE: 1-2 personal factors / co-morbidities  [] HIGH: 3+ personal factors / co-morbidities    Moderate / High Support Documentation:   Co-morbidities affecting plan of care: n/a     Personal Factors:   no deficits     Examination  Body Structures and Functions, activity limitations and participation restrictions that may impact the plan of care [x] LOW: addressing 1-2 elements  [] MODERATE: 3+ elements  [] HIGH: 4+ elements (please support below)    Moderate / High Support Documentation: n/a      Clinical Presentation [x] LOW: stable  [] MODERATE: Evolving  [] HIGH: Unstable     Decision Making/ Complexity Score: low       Goals:  Short Term Goals: 3 weeks   Demonstrate improvement in recent symptoms to progress toward long term goals   Improve sitting/standing postural deficits to reduce pain and promote postural awareness for injury prevention.   Demonstrate compliance with initial exercise program and manual tx.     Long Term Goals:  6 weeks   Demonstrate improvement in cervical AROM with less pain   Demonstrate improvement in activity tolerance   Ambulate community required distances without increased symptoms   FOTO score improvement to  Independent with HEP for continued improvement in function.     Plan     Plan of care Certification: 5/21/2024 to 8/21/2024.    Outpatient Physical Therapy 1-2 times weekly for 6 weeks to include the following interventions: Manual Therapy, Neuromuscular Re-ed, Patient Education, Therapeutic Activities, and Therapeutic Exercise.     Trinh Reyes, PT        Physician's Signature: _________________________________________ Date: ________________

## 2024-05-22 ENCOUNTER — HOSPITAL ENCOUNTER (OUTPATIENT)
Dept: RADIOLOGY | Facility: HOSPITAL | Age: 69
Discharge: HOME | End: 2024-05-22
Attending: INTERNAL MEDICINE
Payer: MEDICARE

## 2024-05-22 ENCOUNTER — HOSPITAL ENCOUNTER (OUTPATIENT)
Dept: PULMONOLOGY | Facility: HOSPITAL | Age: 69
Discharge: HOME | End: 2024-05-22
Attending: INTERNAL MEDICINE
Payer: MEDICARE

## 2024-05-22 VITALS — WEIGHT: 190 LBS | BODY MASS INDEX: 29.82 KG/M2 | HEIGHT: 67 IN

## 2024-05-22 DIAGNOSIS — R91.1 LUNG NODULE: ICD-10-CM

## 2024-05-22 DIAGNOSIS — J44.9 CHRONIC OBSTRUCTIVE PULMONARY DISEASE, UNSPECIFIED COPD TYPE: Chronic | ICD-10-CM

## 2024-05-22 DIAGNOSIS — J41.1 BRONCHITIS, CHRONIC, MUCOPURULENT: ICD-10-CM

## 2024-05-22 PROBLEM — Z74.09 IMPAIRED FUNCTIONAL MOBILITY AND ACTIVITY TOLERANCE: Status: ACTIVE | Noted: 2024-05-22

## 2024-05-22 LAB
DLCO SINGLE BREATH LLN: 17.85
DLCO SINGLE BREATH PRE REF: 41.4 %
DLCO SINGLE BREATH REF: 23.58
DLCOC SBVA LLN: 3.02
DLCOC SBVA REF: 4.33
DLCOC SINGLE BREATH LLN: 17.85
DLCOC SINGLE BREATH REF: 23.58
DLCOVA LLN: 3.02
DLCOVA PRE REF: 68.3 %
DLCOVA PRE: 2.96 ML/(MIN*MMHG*L) (ref 3.02–5.65)
DLCOVA REF: 4.33
ERV LLN: -16449.27
ERV PRE REF: 72.4 %
ERV REF: 0.73
FEF 25 75 LLN: 0.99
FEF 25 75 PRE REF: 57.8 %
FEF 25 75 REF: 2.09
FEV1 FVC LLN: 65
FEV1 FVC PRE REF: 96.8 %
FEV1 FVC REF: 78
FEV1 LLN: 1.85
FEV1 PRE REF: 62.5 %
FEV1 REF: 2.51
FRCPLETH LLN: 2.06
FRCPLETH PREREF: 94.9 %
FRCPLETH REF: 2.88
FVC LLN: 2.39
FVC PRE REF: 63.9 %
FVC REF: 3.25
GLUCOSE SERPL-MCNC: 107 MG/DL (ref 70–110)
IVC PRE: 1.95 L (ref 2.39–4.14)
IVC SINGLE BREATH LLN: 2.39
IVC SINGLE BREATH PRE REF: 60.1 %
IVC SINGLE BREATH REF: 3.25
MVV LLN: 83
MVV PRE REF: 47.6 %
MVV REF: 98
PEF LLN: 4.46
PEF PRE REF: 80.3 %
PEF REF: 6.33
PRE DLCO: 9.76 ML/(MIN*MMHG) (ref 17.85–29.32)
PRE ERV: 0.53 L (ref -16449.27–16450.73)
PRE FEF 25 75: 1.21 L/S (ref 0.99–3.63)
PRE FET 100: 8.54 SEC
PRE FEV1 FVC: 75.57 % (ref 65.15–89.1)
PRE FEV1: 1.57 L (ref 1.85–3.15)
PRE FRC PL: 2.73 L (ref 2.06–3.7)
PRE FVC: 2.08 L (ref 2.39–4.14)
PRE MVV: 46.59 L/MIN (ref 83.2–112.57)
PRE PEF: 5.08 L/S (ref 4.46–8.21)
PRE RV: 2.21 L (ref 1.58–2.73)
PRE TLC: 4.28 L (ref 4.45–6.43)
RAW LLN: 3.06
RAW PRE REF: 122.5 %
RAW PRE: 3.75 CMH2O*S/L (ref 3.06–3.06)
RAW REF: 3.06
RV LLN: 1.58
RV PRE REF: 102.5 %
RV REF: 2.15
RVTLC LLN: 32
RVTLC PRE REF: 123.5 %
RVTLC PRE: 51.53 % (ref 32.15–51.33)
RVTLC REF: 42
TLC LLN: 4.45
TLC PRE REF: 78.7 %
TLC REF: 5.44
VA PRE: 3.3 L (ref 5.29–5.29)
VA SINGLE BREATH LLN: 5.29
VA SINGLE BREATH PRE REF: 62.3 %
VA SINGLE BREATH REF: 5.29
VC LLN: 2.39
VC PRE REF: 63.9 %
VC PRE: 2.08 L (ref 2.39–4.14)
VC REF: 3.25

## 2024-05-22 PROCEDURE — 82962 GLUCOSE BLOOD TEST: CPT | Mod: PO

## 2024-05-22 PROCEDURE — 94010 BREATHING CAPACITY TEST: CPT | Mod: 26,,, | Performed by: INTERNAL MEDICINE

## 2024-05-22 PROCEDURE — 78815 PET IMAGE W/CT SKULL-THIGH: CPT | Mod: TC,PS,PO

## 2024-05-22 PROCEDURE — 94726 PLETHYSMOGRAPHY LUNG VOLUMES: CPT | Mod: 26,,, | Performed by: INTERNAL MEDICINE

## 2024-05-22 PROCEDURE — 94726 PLETHYSMOGRAPHY LUNG VOLUMES: CPT

## 2024-05-22 PROCEDURE — 78815 PET IMAGE W/CT SKULL-THIGH: CPT | Mod: 26,PI,, | Performed by: RADIOLOGY

## 2024-05-22 PROCEDURE — A9552 F18 FDG: HCPCS | Mod: PO | Performed by: INTERNAL MEDICINE

## 2024-05-22 PROCEDURE — 94010 BREATHING CAPACITY TEST: CPT

## 2024-05-22 PROCEDURE — 94729 DIFFUSING CAPACITY: CPT

## 2024-05-22 PROCEDURE — 94729 DIFFUSING CAPACITY: CPT | Mod: 26,,, | Performed by: INTERNAL MEDICINE

## 2024-05-22 RX ORDER — FLUDEOXYGLUCOSE F18 500 MCI/ML
13 INJECTION INTRAVENOUS
Status: COMPLETED | OUTPATIENT
Start: 2024-05-22 | End: 2024-05-22

## 2024-05-22 RX ORDER — CYANOCOBALAMIN 1000 UG/ML
INJECTION, SOLUTION INTRAMUSCULAR; SUBCUTANEOUS
Qty: 10 ML | Refills: 0 | Status: SHIPPED | OUTPATIENT
Start: 2024-05-22

## 2024-05-22 RX ADMIN — FLUDEOXYGLUCOSE F-18 13 MILLICURIE: 500 INJECTION INTRAVENOUS at 12:05

## 2024-05-22 NOTE — TELEPHONE ENCOUNTER
Refill Routing Note   Medication(s) are not appropriate for processing by [facility] for the following reason(s):        Outside of protocol    [facility] action(s):  Route               Appointments  past 12m or future 3m with PCP    Date Provider   Last Visit   5/10/2024 Luana Calvert MD   Next Visit   8/12/2024 Luana Calvert MD   ED visits in past 90 days: 0        Note composed:8:08 AM 05/22/2024

## 2024-05-23 ENCOUNTER — PATIENT MESSAGE (OUTPATIENT)
Dept: FAMILY MEDICINE | Facility: CLINIC | Age: 69
End: 2024-05-23
Payer: MEDICARE

## 2024-05-24 ENCOUNTER — CLINICAL SUPPORT (OUTPATIENT)
Dept: REHABILITATION | Facility: HOSPITAL | Age: 69
End: 2024-05-24
Payer: MEDICARE

## 2024-05-24 DIAGNOSIS — M79.18 MYOFASCIAL PAIN SYNDROME OF LUMBAR SPINE: ICD-10-CM

## 2024-05-24 DIAGNOSIS — Z74.09 IMPAIRED FUNCTIONAL MOBILITY AND ACTIVITY TOLERANCE: Primary | ICD-10-CM

## 2024-05-24 DIAGNOSIS — M79.18 MYOFASCIAL PAIN SYNDROME, CERVICAL: ICD-10-CM

## 2024-05-24 PROCEDURE — 97140 MANUAL THERAPY 1/> REGIONS: CPT | Mod: PN

## 2024-05-24 PROCEDURE — 97014 ELECTRIC STIMULATION THERAPY: CPT | Mod: PN

## 2024-05-25 PROBLEM — M79.18 MYOFASCIAL PAIN SYNDROME, CERVICAL: Status: ACTIVE | Noted: 2024-05-25

## 2024-05-25 PROBLEM — M79.18 MYOFASCIAL PAIN SYNDROME OF LUMBAR SPINE: Status: ACTIVE | Noted: 2024-05-25

## 2024-05-25 NOTE — PROGRESS NOTES
[facility]   Physical Therapy Treatment Note      Name: Alessia Nelson  Clinic Number: [id number]    Therapy Diagnosis:   Encounter Diagnoses   Name Primary?    Impaired functional mobility and activity tolerance Yes    Myofascial pain syndrome of lumbar spine     Myofascial pain syndrome, cervical      Physician: Laura Heath NP    Visit Date: 5/24/2024  Physician Orders: PT Eval and Treat   Medical Diagnosis from Referral:   M51.36 (ICD-10-CM) - DDD (degenerative disc disease), lumbar   M96.1 (ICD-10-CM) - Postlaminectomy syndrome of lumbar region   M50.30 (ICD-10-CM) - DDD (degenerative disc disease), cervical      Evaluation Date: 5/21/2024  Authorization Period Expiration: 12/31/2024  Plan of Care Expiration: 8/21/2024  Progress Note Due: 6/21/2024  Date of Surgery: n/a   Visit # / Visits authorized: 1/ 1   FOTO: 1/ 3     Precautions: Standard      Time In: 9:25 am   Time Out:  10:30 am   Total Billable Time: 40 minutes    PTA Visit #: 0/5       Subjective     Patient reports: PET Scan was negative for CA or metastatic disease; Alessia stated that she still wants the nodules biopsied - hoping she can do this locally.  .  She was compliant with home exercise program.  Response to previous treatment: initial session of DN was helpful for her neck pain   Functional change: trying to increase her activity level at home with household/yard work     Pain: 5/10  Location: bilateral neck, thoracic/scapular area      Objective      Objective Measures updated at progress report unless specified.     Treatment     Alessia received the treatments listed below:      therapeutic exercises to develop strength, ROM, flexibility, and posture for 0 minutes including:      manual therapy techniques: Myofacial release and Soft tissue Mobilization were applied to the: cervical/thoracic spine  for 40 minutes, including:  Cervical sub-occipital inhibition with MET for tissue lengthening/release upper  traps, scalenes on bilateral sides; mobilization of left scapula in all planes - lateral gliding to address pain c/o; Positioned in prone with head/neck support; pillow under hips and ankles - LF-ES per protocol for cervical/rhomboid pain - 30 mm needles for cervical points at 1/5 fb lateral to Sp's, also for scalenes on right and left; 40 mm for Rhomboids - 3 points angling back at scapula; added 3 points for thoracic paraspinals 1.5 fb lateral to SP's at T 3 , T4 and T6.  Intensity of stimulation adjusted to patient tolerance.  Needles left in-situ x 15 mins.  Removed without adverse effects.     neuromuscular re-education activities to improve: Posture for 0 minutes. The following activities were included:      therapeutic activities to improve functional performance for 0  minutes, including:    Patient Education and Home Exercises       Education provided:   - Reviewed HEP for upper trap, levator and scalene tretching     Written Home Exercises Provided: Patient instructed to cont prior HEP. Exercises were reviewed and Alessia was able to demonstrate them prior to the end of the session.  Alessia demonstrated good  understanding of the education provided. See Electronic Medical Record under Patient Instructions for exercises provided during therapy sessions    Assessment     Alessia demonstrated good response to initial sessions of Dry Needling and manual therapy. Will continue with this and add in TA/TE for posture and strengthening.     Alessia Is progressing well towards her goals.   Patient prognosis is Good.     Patient will continue to benefit from skilled outpatient physical therapy to address the deficits listed in the problem list box on initial evaluation, provide pt/family education and to maximize pt's level of independence in the home and community environment.     Patient's spiritual, cultural and educational needs considered and pt agreeable to plan of care and goals.     Anticipated barriers to physical  therapy: none     Goals:   Short Term Goals: 3 weeks   Demonstrate improvement in recent symptoms to progress toward long term goals   Improve sitting/standing postural deficits to reduce pain and promote postural awareness for injury prevention.   Demonstrate compliance with initial exercise program and manual tx.      Long Term Goals:  6 weeks   Demonstrate improvement in cervical AROM with less pain   Demonstrate improvement in activity tolerance   Ambulate community required distances without increased symptoms   FOTO score improvement to  Independent with HEP for continued improvement in function.     Plan     Plan of care Certification: 5/21/2024 to 8/21/2024.     Outpatient Physical Therapy 1-2 times weekly for 6 weeks to include the following interventions: Manual Therapy, Neuromuscular Re-ed, Patient Education, Therapeutic Activities, and Therapeutic Exercise.        Trinh Reyes, PT

## 2024-05-27 ENCOUNTER — CLINICAL SUPPORT (OUTPATIENT)
Dept: REHABILITATION | Facility: HOSPITAL | Age: 69
End: 2024-05-27
Payer: MEDICARE

## 2024-05-27 DIAGNOSIS — Z74.09 IMPAIRED FUNCTIONAL MOBILITY AND ACTIVITY TOLERANCE: Primary | ICD-10-CM

## 2024-05-27 DIAGNOSIS — M79.18 MYOFASCIAL PAIN SYNDROME OF LUMBAR SPINE: ICD-10-CM

## 2024-05-27 DIAGNOSIS — M79.18 MYOFASCIAL PAIN SYNDROME, CERVICAL: ICD-10-CM

## 2024-05-27 PROCEDURE — 97140 MANUAL THERAPY 1/> REGIONS: CPT | Mod: PN

## 2024-05-27 NOTE — PROGRESS NOTES
[facility]   Physical Therapy Treatment Note      Name: Alessia Nelson  Clinic Number: [id number]    Therapy Diagnosis:   Encounter Diagnoses   Name Primary?    Impaired functional mobility and activity tolerance Yes    Myofascial pain syndrome of lumbar spine     Myofascial pain syndrome, cervical      Physician: Laura Heath NP    Visit Date: 5/27/2024  Physician Orders: PT Eval and Treat   Medical Diagnosis from Referral:   M51.36 (ICD-10-CM) - DDD (degenerative disc disease), lumbar   M96.1 (ICD-10-CM) - Postlaminectomy syndrome of lumbar region   M50.30 (ICD-10-CM) - DDD (degenerative disc disease), cervical      Evaluation Date: 5/21/2024  Authorization Period Expiration: 12/31/2024  Plan of Care Expiration: 8/21/2024  Progress Note Due: 6/21/2024  Date of Surgery: n/a   Visit # / Visits authorized: 2/12   FOTO: 1/ 3     Precautions: Standard      Time In: 12:30 pm    Time Out:  1:00 pm    Total Billable Time: 30 minutes    PTA Visit #: 0/5       Subjective     Patient reports: Running late today - had dermatology appointment earlier and they had to take biopsies of a few spots on her face.   She was compliant with home exercise program.  Response to previous treatment: initial session of DN was helpful for her neck pain   Functional change: trying to increase her activity level at home with household/yard work     Pain: 5/10  Location: bilateral neck, thoracic/scapular area      Objective      Objective Measures updated at progress report unless specified.     Treatment     Alessia received the treatments listed below:      therapeutic exercises to develop strength, ROM, flexibility, and posture for 0 minutes including:      manual therapy techniques: Myofacial release and Soft tissue Mobilization were applied to the: cervical/thoracic spine  for 30 minutes, including:  Cervical sub-occipital inhibition with MET for tissue lengthening/release upper traps, scalenes on bilateral  sides; mobilization of left scapula in all planes - lateral gliding to address pain c/o;S/L - mobilization of scapula into all planes with release of sub-scapularis on left     No dry needling today as patient unable to lie facedown.     neuromuscular re-education activities to improve: Posture for 0 minutes. The following activities were included:      therapeutic activities to improve functional performance for 0  minutes, including:    Patient Education and Home Exercises       Education provided:   - Reviewed HEP for upper trap, levator and scalene tretching     Written Home Exercises Provided: Patient instructed to cont prior HEP. Exercises were reviewed and Alessia was able to demonstrate them prior to the end of the session.  Alessia demonstrated good  understanding of the education provided. See Electronic Medical Record under Patient Instructions for exercises provided during therapy sessions    Assessment     Alessia was running late for appointment today unable to lie face down secondary to facial biopsies - no dry needling performed; Manual tx with MFR to neck and scapula as noted above. . Will continue with this and add in TA/TE for posture and strengthening.     Alessia Is progressing well towards her goals.   Patient prognosis is Good.     Patient will continue to benefit from skilled outpatient physical therapy to address the deficits listed in the problem list box on initial evaluation, provide pt/family education and to maximize pt's level of independence in the home and community environment.     Patient's spiritual, cultural and educational needs considered and pt agreeable to plan of care and goals.     Anticipated barriers to physical therapy: none     Goals:   Short Term Goals: 3 weeks   Demonstrate improvement in recent symptoms to progress toward long term goals   Improve sitting/standing postural deficits to reduce pain and promote postural awareness for injury prevention.   Demonstrate compliance  with initial exercise program and manual tx.      Long Term Goals:  6 weeks   Demonstrate improvement in cervical AROM with less pain   Demonstrate improvement in activity tolerance   Ambulate community required distances without increased symptoms   FOTO score improvement to  Independent with HEP for continued improvement in function.     Plan     Plan of care Certification: 5/21/2024 to 8/21/2024.     Outpatient Physical Therapy 1-2 times weekly for 6 weeks to include the following interventions: Manual Therapy, Neuromuscular Re-ed, Patient Education, Therapeutic Activities, and Therapeutic Exercise.        Trinh Reyes, PT

## 2024-05-29 ENCOUNTER — OFFICE VISIT (OUTPATIENT)
Dept: PULMONOLOGY | Facility: CLINIC | Age: 69
End: 2024-05-29
Payer: MEDICARE

## 2024-05-29 VITALS
HEIGHT: 67 IN | HEART RATE: 60 BPM | DIASTOLIC BLOOD PRESSURE: 69 MMHG | OXYGEN SATURATION: 96 % | WEIGHT: 194 LBS | SYSTOLIC BLOOD PRESSURE: 164 MMHG | BODY MASS INDEX: 30.45 KG/M2

## 2024-05-29 DIAGNOSIS — R91.1 SOLITARY PULMONARY NODULE: Primary | ICD-10-CM

## 2024-05-29 DIAGNOSIS — J44.9 CHRONIC OBSTRUCTIVE PULMONARY DISEASE, UNSPECIFIED COPD TYPE: Chronic | ICD-10-CM

## 2024-05-29 PROCEDURE — 99999 PR PBB SHADOW E&M-EST. PATIENT-LVL III: CPT | Mod: PBBFAC,,, | Performed by: INTERNAL MEDICINE

## 2024-05-29 PROCEDURE — 99213 OFFICE O/P EST LOW 20 MIN: CPT | Mod: PBBFAC,PO | Performed by: INTERNAL MEDICINE

## 2024-05-29 PROCEDURE — 99213 OFFICE O/P EST LOW 20 MIN: CPT | Mod: S$PBB,,, | Performed by: INTERNAL MEDICINE

## 2024-05-29 RX ORDER — HYDROCODONE BITARTRATE AND ACETAMINOPHEN 10; 325 MG/1; MG/1
1 TABLET ORAL EVERY 8 HOURS PRN
COMMUNITY
Start: 2024-05-21

## 2024-05-29 NOTE — PATIENT INSTRUCTIONS
We discuss lung lesion right lower lung-- not seen 12/2021 and same 10/2023 and 4/2024 , pet scan good 5/2024.    You wish to do needle biopsy -- lesion should be easy to access, pet scan was favorable but not ruling out cancer.  There is no alternative diagnosis for spot.    Will order needle biopsy    If no clear diagnosis -- will need follow up xray as biopsy may not exclude cancer.    Will order ct in 6 months.  Will notify by phone

## 2024-05-29 NOTE — PROGRESS NOTES
5/29/2024    Alessia Nelson  New Patient Consult    Chief Complaint   Patient presents with    Pulmonary Nodules       HPI:    May 29, 2024-pt had stent placed 7 yrs ago.  Pt will have occ swallow problems.          From sticky note done over a week ago-Notify breathing test showed lungs to be little small.  COPD was not measured to be significant.  Diffusion was low-low oxygen may occur with walking.  No change in plan      Notify PET scan was favorable.  Findings to suggest infection or inflammation.  This is what was suspected.  Will review this at follow-up.  You had history c diff---doxycycline should be safe .         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  Pt smoked til 1/2023.    Pt had neck surg 18 yrs ago, needed esophagus dilated many yrs ago-- and subsequently has had another dilation ---has sense of not being able to swallow-- several times daily.   Had had creamy yellow mucous.  No fever, appetite was real good since 1/2024 with 20 lbs wt gain.       Pt had had routine screen ct screen in 10/2023 and 4/2024 with patchy rll sup segment apical component infiltrate    Pt had had pneumonia 2-3 times.    Pt had blood clot between chest wall and lung-- drained -- 1986....occured aroung time of laproscopyic procedure.       Pt suspected of having add,   Pt had had dilation esophagus-- gets dilation by gi outside [facility], has more trouble swallow recently -- denies aspiration-- pt now overdue for dilation...  Pt slept only 4 h rs last 3 days -- pt felt to have some ptsd --    Pt does have some sob    Will have central chest pains -- left main and lad and rca have some calcification on ct chest   had prior stent and , last stress test in 9/2020 with no ischemia seen.    Patient Instructions   Ct chest from 10/2023 showed vague density in right lower lung superior segment---- area worsened by ct 4/2024.  Would check pet scan but better after germ addressed...        You have chronic yellow mucous  from lung and you have had aspiration problems...    You have mild calcified coronary arteries and history of stent.  You are to follow up with cardiology.    You benefitted from inahlers in past -- and have some short breath.  Use trelegy daily -- continue if helps.  Would check lung capacity    You had history c diff---doxycycline should be safe .     Culture, pet scan in 2 weeks, if pet scan shows abnormality bx-- if not culture/antibiotic or bronchoscopy with special biopsy???.    Would do biopsy if pet scan suggests need.    Will do phone report pet, notify for significant culture result, dose antibiotic if needed....              PFSH:  Past Medical History:   Diagnosis Date    Acute pancreatitis     Back pain     CAD (coronary artery disease)     CHF (congestive heart failure)     COPD (chronic obstructive pulmonary disease)     Depression     Diverticulosis     Encounter for blood transfusion     GERD (gastroesophageal reflux disease)     History of blood clots     1986 AFTER BOWEL RESCETION    History of bowel resection     Hyperlipidemia     Hypertension     Peritonitis     Seizures     HAD A SEIZURE FROM PHENERGAN     Stroke     Thyroid disease     TIA (transient ischemic attack)     Wears dentures     upper         Past Surgical History:   Procedure Laterality Date    ADRENAL GLAND SURGERY      APPENDECTOMY      BACK SURGERY      CAUDAL EPIDURAL STEROID INJECTION N/A 3/1/2021    Procedure: Injection-steroid-epidural-caudal;  Surgeon: Socorro Lorenz MD;  Location: [facility] OR;  Service: Pain Management;  Laterality: N/A;    CHOLECYSTECTOMY      COLONOSCOPY      COLONOSCOPY N/A 6/10/2021    Procedure: COLONOSCOPY;  Surgeon: Sheree Metz MD;  Location: [facility];  Service: Endoscopy;  Laterality: N/A;    COLONOSCOPY N/A 3/8/2022    Procedure: COLONOSCOPY;  Surgeon: Maurilio Rodriguez MD;  Location: [facility] ([address] [place] [address]);  Service: Endoscopy;  Laterality: N/A;  Pt to perform at-home COVID test morning of  procedure-DS  2/24-Blood thinner approval rec'[facility] Dr. Walsh (see letters tab 2/24/22)-DS  3/2-Instructions sent via email-DS  3/7-Pt unable to come earlier due to ride-DS    CORONARY STENT PLACEMENT      CYSTOURETHROSCOPY N/A 11/13/2019    Procedure: CYSTOURETHROSCOPY;  Surgeon: Shun Nuñez MD;  Location: [facility] OR;  Service: Urology;  Laterality: N/A;    DIRECT DIAGNOSTIC LARYNGOSCOPY WITH BRONCHOSCOPY AND ESOPHAGOSCOPY N/A 1/3/2024    Procedure: LARYNGOSCOPY, DIRECT, DIAGNOSTIC, WITH BRONCHOSCOPY AND ESOPHAGOSCOPY;  Surgeon: Mir Belle MD;  Location: [facility] OR;  Service: ENT;  Laterality: N/A;    ENDOSCOPIC ULTRASOUND OF LOWER GASTROINTESTINAL TRACT N/A 3/8/2022    Procedure: ULTRASOUND, LOWER GI TRACT, ENDOSCOPIC;  Surgeon: Maurilio Rodriguez MD;  Location: [facility] ([address] [place] [address]);  Service: Endoscopy;  Laterality: N/A;  Pt to perform at-home COVID test morning of procedure-DS  2/24-Blood thinner approval rec'[facility] Dr. Walsh (see letters tab 2/24/22)-DS  3/2-Instructions sent via email-DS  3/7-Pt unable to come earlier due to ride-DS    ENDOSCOPIC ULTRASOUND OF UPPER GASTROINTESTINAL TRACT N/A 11/25/2019    Procedure: ULTRASOUND, UPPER GI TRACT, ENDOSCOPIC;  Surgeon: Alhaji Bridges MD;  Location: [facility] ([address] [place] [address]);  Service: Endoscopy;  Laterality: N/A;  5 day hold Plavix, Dr Jovanni Serrano - pg  PM prep    ENDOSCOPIC ULTRASOUND OF UPPER GASTROINTESTINAL TRACT N/A 11/2/2020    Procedure: ULTRASOUND, UPPER GI TRACT, ENDOSCOPIC;  Surgeon: Maurilio Rodriguez MD;  [facility]  Service: Endoscopy;  Laterality: N/A;  5 day hold Plavix, Dr Roman Walsh - pg  Covid-19 test 10/30/20 at [facility]    EPIDURAL STEROID INJECTION INTO CERVICAL SPINE N/A 12/8/2021    Procedure: Injection-steroid-epidural-cervical;  Surgeon: Socorro Lorenz MD;  Location: [facility] OR;  Service: Pain Management;  Laterality: N/A;    EPIDURAL STEROID INJECTION INTO CERVICAL SPINE N/A 11/30/2023    Procedure:  Injection-steroid-epidural-cervical;  Surgeon: Maurilio Carroll MD;  [facility];  Service: Anesthesiology;  Laterality: N/A;  c7-T1    ESOPHAGOGASTRODUODENOSCOPY N/A 6/10/2021    Procedure: EGD (ESOPHAGOGASTRODUODENOSCOPY);  Surgeon: Sheree Metz MD;  Location: [facility];  Service: Endoscopy;  Laterality: N/A;    ESOPHAGOSCOPY, USING BOUGIE, WITH DILATION N/A 1/3/2024    Procedure: ESOPHAGOSCOPY, USING BOUGIE, WITH DILATION;  Surgeon: Mir Belle MD;  Location: [facility] OR;  Service: ENT;  Laterality: N/A;    FRACTURE SURGERY Left 05/02/2022    ankle    HERNIA REPAIR      HYSTERECTOMY      INCISIONAL HERNIA REPAIR      INJECTION OF ANESTHETIC AGENT AROUND NERVE Right 5/27/2019    Procedure: RIGHT L5-S3 MEDIAL BRANCH BLOCKS;  Surgeon: Sneha Aragon MD;  Location: [facility] OR;  Service: Pain Management;  Laterality: Right;  **DO NOT STOP PLAVIX**    INJECTION OF JOINT Right 9/22/2021    Procedure: Injection, Joint; Right SI Joint Injection;  Surgeon: Socorro Lorenz MD;  Location: [facility] OR;  Service: Pain Management;  Laterality: Right;  Oral    INSERTION OF DORSAL COLUMN NERVE STIMULATOR FOR TRIAL N/A 6/7/2021    Procedure: INSERTION, NEUROSTIMULATOR, SPINAL CORD, DORSAL COLUMN, FOR TRIAL;  Surgeon: Socorro Lorenz MD;  Location: [facility] OR;  Service: Pain Management;  Laterality: N/A;  nevro rep confirmed start time    instestine      bowel section    KNEE ARTHROPLASTY Right 12/18/2019    Procedure: ARTHROPLASTY, KNEE;  Surgeon: Ike Briceño II, MD;  Location: [facility] OR;  Service: Orthopedics;  Laterality: Right;    KNEE SURGERY  1983    OOPHORECTOMY      PARATHYROID GLAND SURGERY      3 surgeries    RADIOFREQUENCY ABLATION Right 6/10/2019    Procedure: Radiofrequency Ablation - RIGHT L3-5 RADIOFREQUENCY ABLATION WITH HALYARD COOLIEF THERMAL SYSTEM;  Surgeon: Sneha Aragon MD;  Location: [facility] OR;  Service: Pain Management;  Laterality: Right;  **HOLD PLAVIX x 7 DAYS PRIOR**    SPINAL CORD  "STIMULATOR REMOVAL N/A 2024    Procedure: REMOVAL, NEUROSTIMULATOR, SPINAL;  Surgeon: Maurilio Carroll MD;  Location: [facility];  Service: Pain Management;  Laterality: N/A;  removal of spinal cord stimulator    UPPER GASTROINTESTINAL ENDOSCOPY       Social History     Tobacco Use    Smoking status: Former     Current packs/day: 0.00     Average packs/day: 1 pack/day for 46.0 years (46.0 ttl pk-yrs)     Types: Cigarettes     Start date: 1977     Quit date: 2023     Years since quittin.4    Smokeless tobacco: Never   Substance Use Topics    Alcohol use: Not Currently    Drug use: No     Family History   Problem Relation Name Age of Onset    Stroke Maternal Grandmother      Cancer Maternal Grandfather      Stroke Mother      Heart disease Father      Breast cancer Sister       Review of patient's allergies indicates:   Allergen Reactions    Aspirin Other (See Comments)     "Makes stomach feel like it's on fire"    Phenergan [promethazine] Other (See Comments)     SEIZURES    Lortab [hydrocodone-acetaminophen] Itching     Able to take generic Norco          Review of Systems:  a review of eleven systems covering constitutional, Eye, HEENT, Psych, Respiratory, Cardiac, GI, , Musculoskeletal, Endocrine, Dermatologic was negative except for pertinent findings as listed ABOVE and below:  pertinent positives as above, rest good        Exam:Comprehensive exam done. BP (!) 164/69 (BP Location: Left arm, Patient Position: Sitting, BP Method: Large (Automatic))   Pulse 60   Ht 5' 7" (1.702 m)   Wt 88 kg (194 lb 0.1 oz)   SpO2 96%   BMI 30.39 kg/m²   Exam included Vitals as listed, and patient's appearance and affect and alertness and mood, oral exam for yeast and hygiene and pharynx lesions and Mallapatti (M) score, neck with inspection for jvd and masses and thyroid abnormalities and lymph nodes (supraclavicular and infraclavicular nodes and axillary also examined and noted if abn), chest exam included " symmetry and effort and fremitus and percussion and auscultation, cardiac exam included rhythm and gallops and murmur and rubs and jvd and edema, abdominal exam for mass and hepatosplenomegaly and tenderness and hernias and bowel sounds, Musculoskeletal exam with muscle tone and posture and mobility/gait and  strength, and skin for rashes and cyanosis and pallor and turgor, extremity for clubbing.  Findings were normal except for pertinent findings listed below:  M4, chest is symmetric, no distress, normal percussion, normal fremitus and good normal breath sounds          Radiographs (ct chest and cxr) reviewed: view by direct vision      Labs reviewed           PFT will be done and results to be reviewed       Plan:  Clinical impression is apparently straight forward and impression with management as below.     Alessia was seen today for pulmonary nodules.    Diagnoses and all orders for this visit:    Solitary pulmonary nodule  -     CT Guided Needle Placement; Future  -     CT Chest Without Contrast; Future  -     IR CT Guidance; Future    Chronic obstructive pulmonary disease, unspecified COPD type          Follow up in about 6 months (around 11/29/2024), or if symptoms worsen or fail to improve.    Discussed with patient above for education the following:      Patient Instructions   We discuss lung lesion right lower lung-- not seen 12/2021 and same 10/2023 and 4/2024 , pet scan good 5/2024.    You wish to do needle biopsy -- lesion should be easy to access, pet scan was favorable but not ruling out cancer.  There is no alternative diagnosis for spot.    Will order needle biopsy    If no clear diagnosis -- will need follow up xray as biopsy may not exclude cancer.    Will order ct in 6 months.  Will notify by phone

## 2024-05-31 ENCOUNTER — TELEPHONE (OUTPATIENT)
Dept: PULMONOLOGY | Facility: CLINIC | Age: 69
End: 2024-05-31
Payer: MEDICARE

## 2024-05-31 DIAGNOSIS — R91.1 SOLITARY PULMONARY NODULE: Primary | ICD-10-CM

## 2024-06-04 ENCOUNTER — TELEPHONE (OUTPATIENT)
Dept: PULMONOLOGY | Facility: CLINIC | Age: 69
End: 2024-06-04
Payer: MEDICARE

## 2024-06-04 ENCOUNTER — TELEPHONE (OUTPATIENT)
Dept: INTERVENTIONAL RADIOLOGY/VASCULAR | Facility: HOSPITAL | Age: 69
End: 2024-06-04

## 2024-06-04 DIAGNOSIS — R91.1 SOLITARY PULMONARY NODULE: Primary | ICD-10-CM

## 2024-06-04 NOTE — TELEPHONE ENCOUNTER
----- Message from Melia Heaton sent at 6/4/2024  2:57 PM CDT -----  Contact: self  Type:  RX Refill Request    Who Called:  pt  Refill or New Rx:  refill  RX Name and Strength:  metoprolol succinate (TOPROL-XL) 100 MG 24 hr tablet  Medication  Date: 6/1/2022 Department: [facility] Documenting: Gela Traore DO Authorizing: Provider, Historical    Order Providers      How is the patient currently taking it? (ex. 1XDay):  as directed  Is this a 30 day or 90 day RX:  ?  Preferred Pharmacy with phone number:      [facility] [address] [address]  Phone: [phone] Fax: [phone]     Local or Mail Order:  mail  Ordering Provider:  deloris Silva Call Back Number:  [phone]   Additional Information:  please advise

## 2024-06-04 NOTE — NURSING
Request received for CT Biopsy of Lung - per radiologist a bronchoscopy should be done for biopsy.     Dr. Olivier and staff notified.

## 2024-06-04 NOTE — TELEPHONE ENCOUNTER
We received a request for a CT Lung BX for the attached patient. Per the radiologist, we can not perform this procedure due to inability to access lesion safely. He believes a bronch would best serve this. Thanks for your time.       Jialene Garcia RN   [facility] Interventional Radiology/ Vascular Access Services

## 2024-06-04 NOTE — TELEPHONE ENCOUNTER
----- Message from Jailene Garcia RN sent at 6/4/2024  1:05 PM CDT -----  Regarding: CT Lung BX Request  Good afternoon,     We received a request for a CT Lung BX for the attached patient. Per the radiologist, we can not perform this procedure due to inability to access lesion safely. He believes a bronch would best serve this. Thanks for your time.       Jailene Garcia RN   [facility] Interventional Radiology/ Vascular Access Services

## 2024-06-05 ENCOUNTER — TELEPHONE (OUTPATIENT)
Dept: PULMONOLOGY | Facility: CLINIC | Age: 69
End: 2024-06-05
Payer: MEDICARE

## 2024-06-05 ENCOUNTER — CLINICAL SUPPORT (OUTPATIENT)
Dept: REHABILITATION | Facility: HOSPITAL | Age: 69
End: 2024-06-05
Payer: MEDICARE

## 2024-06-05 DIAGNOSIS — Z74.09 IMPAIRED FUNCTIONAL MOBILITY AND ACTIVITY TOLERANCE: Primary | ICD-10-CM

## 2024-06-05 DIAGNOSIS — M79.18 MYOFASCIAL PAIN SYNDROME OF LUMBAR SPINE: ICD-10-CM

## 2024-06-05 DIAGNOSIS — R91.1 SOLITARY PULMONARY NODULE: Primary | ICD-10-CM

## 2024-06-05 DIAGNOSIS — M79.18 MYOFASCIAL PAIN SYNDROME, CERVICAL: ICD-10-CM

## 2024-06-05 PROCEDURE — 97140 MANUAL THERAPY 1/> REGIONS: CPT | Mod: PN,CQ

## 2024-06-05 NOTE — TELEPHONE ENCOUNTER
Please sign new order for [facility] radiology.  Spoke to them.  They will show to radiologist for review of scans and will let us know if can be done there.

## 2024-06-05 NOTE — PROGRESS NOTES
[facility]   Physical Therapy Treatment Note      Name: Alessia Nelson  Clinic Number: [id number]    Therapy Diagnosis:   Encounter Diagnoses   Name Primary?    Impaired functional mobility and activity tolerance Yes    Myofascial pain syndrome of lumbar spine     Myofascial pain syndrome, cervical      Physician: Laura Heath NP    Visit Date: 6/5/2024  Physician Orders: PT Eval and Treat   Medical Diagnosis from Referral:   M51.36 (ICD-10-CM) - DDD (degenerative disc disease), lumbar   M96.1 (ICD-10-CM) - Postlaminectomy syndrome of lumbar region   M50.30 (ICD-10-CM) - DDD (degenerative disc disease), cervical      Evaluation Date: 5/21/2024  Authorization Period Expiration: 12/31/2024  Plan of Care Expiration: 8/21/2024  Progress Note Due: 6/21/2024  Date of Surgery: n/a   Visit # / Visits authorized: 4/12   FOTO: 1/ 3     Precautions: Standard      Time In: 12:45 pm    Time Out:  1:15 pm    Total Billable Time: 30 minutes    PTA Visit #: 1/5       Subjective     Patient reports: feeling really good after Trinh's last treatment.  She slept for 4 hours.  She was compliant with home exercise program.  Response to previous treatment: great  Functional change: able to perform ADL's with less pain    Pain: 5/10  Location: bilateral neck, thoracic/scapular area      Objective      Objective Measures updated at progress report unless specified.     Treatment     Alessia received the treatments listed below:      therapeutic exercises to develop strength, ROM, flexibility, and posture for 0 minutes including:      manual therapy techniques: Myofacial release and Soft tissue Mobilization were applied to the: cervical/thoracic spine  for 30 minutes, including:  Myofascial Releases   Bilateral pec minor   Sub ;occipitals   Upper traps/scalenes    No dry needling today as patient unable to lie facedown.     neuromuscular re-education activities to improve: Posture for 0 minutes. The following  activities were included:      therapeutic activities to improve functional performance for 0  minutes, including:    Patient Education and Home Exercises       Education provided:   - Reviewed HEP for upper trap, levator and scalene tretching     Written Home Exercises Provided: Patient instructed to cont prior HEP. Exercises were reviewed and Alessia was able to demonstrate them prior to the end of the session.  Alessia demonstrated good  understanding of the education provided. See Electronic Medical Record under Patient Instructions for exercises provided during therapy sessions    Assessment     Alessia did well with the releases. . Will continue with this and add in TA/TE for posture and strengthening.     Alessia Is progressing well towards her goals.   Patient prognosis is Good.     Patient will continue to benefit from skilled outpatient physical therapy to address the deficits listed in the problem list box on initial evaluation, provide pt/family education and to maximize pt's level of independence in the home and community environment.     Patient's spiritual, cultural and educational needs considered and pt agreeable to plan of care and goals.     Anticipated barriers to physical therapy: none     Goals:   Short Term Goals: 3 weeks   Demonstrate improvement in recent symptoms to progress toward long term goals   Improve sitting/standing postural deficits to reduce pain and promote postural awareness for injury prevention.   Demonstrate compliance with initial exercise program and manual tx.      Long Term Goals:  6 weeks   Demonstrate improvement in cervical AROM with less pain   Demonstrate improvement in activity tolerance   Ambulate community required distances without increased symptoms   FOTO score improvement to  Independent with HEP for continued improvement in function.     Plan     Plan of care Certification: 5/21/2024 to 8/21/2024.     Outpatient Physical Therapy 1-2 times weekly for 6 weeks to  include the following interventions: Manual Therapy, Neuromuscular Re-ed, Patient Education, Therapeutic Activities, and Therapeutic Exercise.        Liang Santos, PTA

## 2024-06-06 ENCOUNTER — HOSPITAL ENCOUNTER (OUTPATIENT)
Dept: CARDIOLOGY | Facility: HOSPITAL | Age: 69
Discharge: HOME | End: 2024-06-06
Attending: STUDENT IN AN ORGANIZED HEALTH CARE EDUCATION/TRAINING PROGRAM
Payer: MEDICARE

## 2024-06-06 ENCOUNTER — TELEPHONE (OUTPATIENT)
Dept: RADIOLOGY | Facility: HOSPITAL | Age: 69
End: 2024-06-06

## 2024-06-06 VITALS — WEIGHT: 194 LBS | BODY MASS INDEX: 30.45 KG/M2 | HEIGHT: 67 IN

## 2024-06-06 DIAGNOSIS — I25.10 ATHEROSCLEROSIS OF NATIVE CORONARY ARTERY OF NATIVE HEART WITHOUT ANGINA PECTORIS: ICD-10-CM

## 2024-06-06 LAB — BSA FOR ECHO PROCEDURE: 2.04 M2

## 2024-06-06 PROCEDURE — 93306 TTE W/DOPPLER COMPLETE: CPT

## 2024-06-06 RX ORDER — BUTALB/ACETAMINOPHEN/CAFFEINE 50-325-40
1 TABLET ORAL DAILY
COMMUNITY

## 2024-06-06 NOTE — NURSING
Pre procedure instructions for lung biopsy given to pt verbalized understanding, to arrive at 7 am on 6/17/24 npo after mn , to stop her plavix 5 days prior to the procedure, will have friends to drive her home

## 2024-06-07 ENCOUNTER — CLINICAL SUPPORT (OUTPATIENT)
Dept: REHABILITATION | Facility: HOSPITAL | Age: 69
End: 2024-06-07
Payer: MEDICARE

## 2024-06-07 DIAGNOSIS — Z74.09 IMPAIRED FUNCTIONAL MOBILITY AND ACTIVITY TOLERANCE: Primary | ICD-10-CM

## 2024-06-07 DIAGNOSIS — M79.18 MYOFASCIAL PAIN SYNDROME, CERVICAL: ICD-10-CM

## 2024-06-07 DIAGNOSIS — M79.18 MYOFASCIAL PAIN SYNDROME OF LUMBAR SPINE: ICD-10-CM

## 2024-06-07 PROCEDURE — 97140 MANUAL THERAPY 1/> REGIONS: CPT | Mod: PN,CQ

## 2024-06-07 NOTE — PROGRESS NOTES
[facility]   Physical Therapy Treatment Note      Name: Alessia Nelson  Clinic Number: [id number]    Therapy Diagnosis:   Encounter Diagnoses   Name Primary?    Impaired functional mobility and activity tolerance Yes    Myofascial pain syndrome of lumbar spine     Myofascial pain syndrome, cervical      Physician: Laura Heath NP    Visit Date: 6/7/2024  Physician Orders: PT Eval and Treat   Medical Diagnosis from Referral:   M51.36 (ICD-10-CM) - DDD (degenerative disc disease), lumbar   M96.1 (ICD-10-CM) - Postlaminectomy syndrome of lumbar region   M50.30 (ICD-10-CM) - DDD (degenerative disc disease), cervical      Evaluation Date: 5/21/2024  Authorization Period Expiration: 12/31/2024  Plan of Care Expiration: 8/21/2024  Progress Note Due: 6/21/2024  Date of Surgery: n/a   Visit # / Visits authorized: 4/12   FOTO: 1/ 3     Precautions: Standard      Time In: 12:45 pm    Time Out:  1:25 pm    Total Billable Time: 40 minutes    PTA Visit #: 2/5       Subjective     Patient reports: no real change from last treatment.  She was compliant with home exercise program.  Response to previous treatment: ok  Functional change: able to perform ADL's with less pain    Pain: 5/10  Location: bilateral neck, thoracic/scapular area      Objective      Objective Measures updated at progress report unless specified.     Treatment     Alessia received the treatments listed below:      therapeutic exercises to develop strength, ROM, flexibility, and posture for 0 minutes including:      manual therapy techniques: Myofacial release and Soft tissue Mobilization were applied to the: cervical/thoracic spine  for 40 minutes, including:  Myofascial Releases   Bilateral pec minor   Sub occipitals   Upper and mid traps/scalenes  STM   Left scap  EMT   RTC tension reduction    No dry needling today as patient unable to lie facedown.     neuromuscular re-education activities to improve: Posture for 0 minutes.  The following activities were included:      therapeutic activities to improve functional performance for 0  minutes, including:    Patient Education and Home Exercises       Education provided:   - Reviewed HEP for upper trap, levator and scalene tretching     Written Home Exercises Provided: Patient instructed to cont prior HEP. Exercises were reviewed and Alessia was able to demonstrate them prior to the end of the session.  Alessia demonstrated good  understanding of the education provided. See Electronic Medical Record under Patient Instructions for exercises provided during therapy sessions    Assessment     Alessia did well with the releases. . Will continue with this and add in TA/TE for posture and strengthening.     Alessia Is progressing well towards her goals.   Patient prognosis is Good.     Patient will continue to benefit from skilled outpatient physical therapy to address the deficits listed in the problem list box on initial evaluation, provide pt/family education and to maximize pt's level of independence in the home and community environment.     Patient's spiritual, cultural and educational needs considered and pt agreeable to plan of care and goals.     Anticipated barriers to physical therapy: none     Goals:   Short Term Goals: 3 weeks   Demonstrate improvement in recent symptoms to progress toward long term goals   Improve sitting/standing postural deficits to reduce pain and promote postural awareness for injury prevention.   Demonstrate compliance with initial exercise program and manual tx.      Long Term Goals:  6 weeks   Demonstrate improvement in cervical AROM with less pain   Demonstrate improvement in activity tolerance   Ambulate community required distances without increased symptoms   FOTO score improvement to  Independent with HEP for continued improvement in function.     Plan     Plan of care Certification: 5/21/2024 to 8/21/2024.     Outpatient Physical Therapy 1-2 times weekly for 6  weeks to include the following interventions: Manual Therapy, Neuromuscular Re-ed, Patient Education, Therapeutic Activities, and Therapeutic Exercise.        Liang Santos, PTA

## 2024-06-10 RX ORDER — METOPROLOL SUCCINATE 50 MG/1
50 TABLET, EXTENDED RELEASE ORAL DAILY
Qty: 90 TABLET | Refills: 2 | Status: SHIPPED | OUTPATIENT
Start: 2024-06-10

## 2024-06-12 ENCOUNTER — TELEPHONE (OUTPATIENT)
Dept: PULMONOLOGY | Facility: CLINIC | Age: 69
End: 2024-06-12
Payer: MEDICARE

## 2024-06-12 NOTE — TELEPHONE ENCOUNTER
Returned a call to the pt. Pt wants to cancel her biopsy appt with [facility]. Pt will call the scheduling number to cancel.  Pt was told by the anesthesologist  and the Radiologist that they don't recommend.

## 2024-06-12 NOTE — TELEPHONE ENCOUNTER
----- Message from Tara Correa sent at 6/12/2024  3:07 PM CDT -----  Contact: pt  Type:  Sooner Apoointment Request    Caller is requesting a sooner appointment.  Caller declined first available appointment listed below.  Caller will not accept being placed on the waitlist and is requesting a message be sent to doctor.    Name of Caller: pt    When is the first available appointment? Department book    Symptoms: bronchoscopy     Would the patient rather a call back or a response via [facility]? Call back    Best Call Back Number: [phone]    Additional Information: wants to cancel the needle biopsy and do a bronchoscopy instead    Please call to advise  Thanks

## 2024-06-18 ENCOUNTER — CLINICAL SUPPORT (OUTPATIENT)
Dept: REHABILITATION | Facility: HOSPITAL | Age: 69
End: 2024-06-18
Payer: MEDICARE

## 2024-06-18 DIAGNOSIS — Z74.09 IMPAIRED FUNCTIONAL MOBILITY AND ACTIVITY TOLERANCE: Primary | ICD-10-CM

## 2024-06-18 DIAGNOSIS — M79.18 MYOFASCIAL PAIN SYNDROME, CERVICAL: ICD-10-CM

## 2024-06-18 DIAGNOSIS — M79.18 MYOFASCIAL PAIN SYNDROME OF LUMBAR SPINE: ICD-10-CM

## 2024-06-18 PROCEDURE — 97140 MANUAL THERAPY 1/> REGIONS: CPT | Mod: PN

## 2024-06-18 PROCEDURE — 97014 ELECTRIC STIMULATION THERAPY: CPT | Mod: PN

## 2024-06-18 NOTE — PROGRESS NOTES
[facility]   Physical Therapy Treatment Note      Name: Alessia Nelson  Clinic Number: [id number]    Therapy Diagnosis:   Encounter Diagnoses   Name Primary?    Impaired functional mobility and activity tolerance Yes    Myofascial pain syndrome of lumbar spine     Myofascial pain syndrome, cervical      Physician: Laura Heath NP    Visit Date: 6/18/2024  Physician Orders: PT Eval and Treat   Medical Diagnosis from Referral:   M51.36 (ICD-10-CM) - DDD (degenerative disc disease), lumbar   M96.1 (ICD-10-CM) - Postlaminectomy syndrome of lumbar region   M50.30 (ICD-10-CM) - DDD (degenerative disc disease), cervical      Evaluation Date: 5/21/2024  Authorization Period Expiration: 12/31/2024  Plan of Care Expiration: 8/21/2024  Progress Note Due: 6/21/2024  Date of Surgery: n/a   Visit # / Visits authorized: 5/12   FOTO: 1/ 3     Precautions: Standard      Time In: 1115 am    Time Out:  12:15 pm    Total Billable Time: 40 minutes    PTA Visit #: 0/5       Subjective     Patient reports: Continues to report left shoulder blade pain with all motion; Has been unable to get lung biopsy that she wants to confirm non-malignancy - confusion with medical providers on scheduling. This is frustrating her.   She was compliant with home exercise program.  Response to previous treatment: ok  Functional change: continued pain, hasn't been able to get in her pool the past several days secondary to the weather.     Pain: 5/10  Location: bilateral neck, thoracic/scapular area      Objective      Objective Measures updated at progress report unless specified.     Treatment     Alessia received the treatments listed below:      therapeutic exercises to develop strength, ROM, flexibility, and posture for 0 minutes including:      manual therapy techniques: Myofacial release and Soft tissue Mobilization were applied to the: cervical/thoracic spine  for 40 minutes, including:  Cervical sub-occipital  inhibition with MET for tissue lengthening/release upper traps, scalenes on bilateral sides; mobilization of left scapula in all planes - lateral gliding to address pain c/o; Positioned in prone with head/neck support; pillow under hips and ankles - LF-ES per protocol for cervical/rhomboid pain - 30 mm needles for cervical points at 1/5 fb lateral to Sp's, also for scalenes on right and left; 40 mm for Rhomboids - 3 points angling back at scapula; added 3 points for thoracic paraspinals 1.5 fb lateral to SP's at T 3 , T4 and T6. Intensity of stimulation adjusted to patient tolerance. Needles left in-situ x 15 mins. Removed without adverse effects.     neuromuscular re-education activities to improve: Posture for 0 minutes. The following activities were included:      therapeutic activities to improve functional performance for 0  minutes, including:    Patient Education and Home Exercises       Education provided:   - Reviewed HEP for upper trap, levator and scalene tretching     Written Home Exercises Provided: Patient instructed to cont prior HEP. Exercises were reviewed and Alessia was able to demonstrate them prior to the end of the session.  Alessia demonstrated good  understanding of the education provided. See Electronic Medical Record under Patient Instructions for exercises provided during therapy sessions    Assessment     Alessia noting continued neck and left scapula pain; unable to exercise in her pool this past week as result of weather; Responds well to manual tx with MFR/Dry Needling, unable to add in more TE/TA secondary to increased pain at visits.  Hopefully can get this under control and work more on activity tolerance.     Alessia Is progressing well towards her goals.   Patient prognosis is Good.     Patient will continue to benefit from skilled outpatient physical therapy to address the deficits listed in the problem list box on initial evaluation, provide pt/family education and to maximize pt's  level of independence in the home and community environment.     Patient's spiritual, cultural and educational needs considered and pt agreeable to plan of care and goals.     Anticipated barriers to physical therapy: none     Goals:   Short Term Goals: 3 weeks   Demonstrate improvement in recent symptoms to progress toward long term goals  > Minimal progress   Improve sitting/standing postural deficits to reduce pain and promote postural awareness for injury prevention.  > Improving   Demonstrate compliance with initial exercise program and manual tx.  > Improving.      Long Term Goals:  6 weeks   Demonstrate improvement in cervical AROM with less pain   Demonstrate improvement in activity tolerance   Ambulate community required distances without increased symptoms   FOTO score improvement to  Independent with HEP for continued improvement in function.     Plan     Plan of care Certification: 5/21/2024 to 8/21/2024.     Outpatient Physical Therapy 1-2 times weekly for 6 weeks to include the following interventions: Manual Therapy, Neuromuscular Re-ed, Patient Education, Therapeutic Activities, and Therapeutic Exercise.        Trinh Reyes, PT

## 2024-06-21 ENCOUNTER — CLINICAL SUPPORT (OUTPATIENT)
Dept: REHABILITATION | Facility: HOSPITAL | Age: 69
End: 2024-06-21
Payer: MEDICARE

## 2024-06-21 DIAGNOSIS — M79.18 MYOFASCIAL PAIN SYNDROME OF LUMBAR SPINE: ICD-10-CM

## 2024-06-21 DIAGNOSIS — Z74.09 IMPAIRED FUNCTIONAL MOBILITY AND ACTIVITY TOLERANCE: Primary | ICD-10-CM

## 2024-06-21 DIAGNOSIS — M79.18 MYOFASCIAL PAIN SYNDROME, CERVICAL: ICD-10-CM

## 2024-06-21 PROCEDURE — 97140 MANUAL THERAPY 1/> REGIONS: CPT | Mod: PN

## 2024-06-21 PROCEDURE — 97014 ELECTRIC STIMULATION THERAPY: CPT | Mod: PN

## 2024-06-21 NOTE — PROGRESS NOTES
"[facility]   Physical Therapy Treatment Note      Name: Alessia Nelson  Clinic Number: [id number]    Therapy Diagnosis:   Encounter Diagnoses   Name Primary?    Impaired functional mobility and activity tolerance Yes    Myofascial pain syndrome of lumbar spine     Myofascial pain syndrome, cervical      Physician: Laura Heath NP    Visit Date: 6/21/2024  Physician Orders: PT Eval and Treat   Medical Diagnosis from Referral:   M51.36 (ICD-10-CM) - DDD (degenerative disc disease), lumbar   M96.1 (ICD-10-CM) - Postlaminectomy syndrome of lumbar region   M50.30 (ICD-10-CM) - DDD (degenerative disc disease), cervical      Evaluation Date: 5/21/2024  Authorization Period Expiration: 12/31/2024  Plan of Care Expiration: 8/21/2024  Progress Note Due: 6/21/2024  Date of Surgery: n/a   Visit # / Visits authorized: 6/12   FOTO: 1/ 3     Precautions: Standard      Time In: 1115 am    Time Out:  1210     Total Billable Time: 40 minutes    PTA Visit #: 0/5       Subjective     Patient reports: I'm doing better than I was the other day, not getting the "sharp" pains that I was getting last visit.   I'm hoping to be able to get in the pool today/over the weekend to start exercising.   She was compliant with home exercise program.  Response to previous treatment: ok  Functional change: improved from last visit; no sharp pain with movement today    Pain: 5-6/10  Location: bilateral neck, thoracic/scapular area      Objective      Objective Measures updated at progress report unless specified.     Treatment     Alessia received the treatments listed below:      therapeutic exercises to develop strength, ROM, flexibility, and posture for 0 minutes including:      manual therapy techniques: Myofacial release and Soft tissue Mobilization were applied to the: cervical/thoracic spine  for 40 minutes, including:  Seated - IASTM to left posterior neck into left upper trap, levator and rhomboids, middle " trap/lower trap; S/L mobilization of left scapula in all planes - lateral gliding to address pain c/o; Stayed in right S/L position;  - LF-ES per protocol for Levator, upper trap and rhomboid pain - 50 mm needles for left upper trap,  40 mm for Rhomboids - 3 points angling back at scapula; added 3 points for thoracic paraspinals 1.5 fb lateral to SP's at T 3 , T4 and T6. Clockwise winding of needles for increased tissue grasp.  Intensity of stimulation adjusted to patient tolerance. Needles left in-situ x 15 mins. Removed without adverse effects.     neuromuscular re-education activities to improve: Posture for 0 minutes. The following activities were included:      therapeutic activities to improve functional performance for 0  minutes, including:    Patient Education and Home Exercises       Education provided:   - Reviewed HEP for upper trap, levator and scalene tretching     Written Home Exercises Provided: Patient instructed to cont prior HEP. Exercises were reviewed and Alessia was able to demonstrate them prior to the end of the session.  Alessia demonstrated good  understanding of the education provided. See Electronic Medical Record under Patient Instructions for exercises provided during therapy sessions    Assessment     Alessia noting improvement in neck/left scapular pain;  Responds well to manual tx with MFR/Dry Needling, unable to add in more TE/TA secondary to increased pain at visits.  Hopefully can get this under control and work more on activity tolerance.     Alessia Is progressing well towards her goals.   Patient prognosis is Good.     Patient will continue to benefit from skilled outpatient physical therapy to address the deficits listed in the problem list box on initial evaluation, provide pt/family education and to maximize pt's level of independence in the home and community environment.     Patient's spiritual, cultural and educational needs considered and pt agreeable to plan of care and  goals.     Anticipated barriers to physical therapy: none     Goals:   Short Term Goals: 3 weeks   Demonstrate improvement in recent symptoms to progress toward long term goals  > Minimal progress   Improve sitting/standing postural deficits to reduce pain and promote postural awareness for injury prevention.  > Improving   Demonstrate compliance with initial exercise program and manual tx.  > Improving.      Long Term Goals:  6 weeks   Demonstrate improvement in cervical AROM with less pain   Demonstrate improvement in activity tolerance   Ambulate community required distances without increased symptoms   FOTO score improvement to  Independent with HEP for continued improvement in function.     Plan     Plan of care Certification: 5/21/2024 to 8/21/2024.     Outpatient Physical Therapy 1-2 times weekly for 6 weeks to include the following interventions: Manual Therapy, Neuromuscular Re-ed, Patient Education, Therapeutic Activities, and Therapeutic Exercise.        Trinh Reyes, PT

## 2024-06-25 ENCOUNTER — CLINICAL SUPPORT (OUTPATIENT)
Dept: REHABILITATION | Facility: HOSPITAL | Age: 69
End: 2024-06-25
Payer: MEDICARE

## 2024-06-25 DIAGNOSIS — M79.18 MYOFASCIAL PAIN SYNDROME OF LUMBAR SPINE: ICD-10-CM

## 2024-06-25 DIAGNOSIS — Z74.09 IMPAIRED FUNCTIONAL MOBILITY AND ACTIVITY TOLERANCE: Primary | ICD-10-CM

## 2024-06-25 DIAGNOSIS — M79.18 MYOFASCIAL PAIN SYNDROME, CERVICAL: ICD-10-CM

## 2024-06-25 PROCEDURE — 97110 THERAPEUTIC EXERCISES: CPT | Mod: KX,PN

## 2024-06-25 PROCEDURE — 97140 MANUAL THERAPY 1/> REGIONS: CPT | Mod: KX,PN

## 2024-06-25 NOTE — PROGRESS NOTES
"[facility]   Physical Therapy Treatment Note  / Progress Report      Name: Alessia Nelson  Clinic Number: [id number]    Therapy Diagnosis:   Encounter Diagnoses   Name Primary?    Impaired functional mobility and activity tolerance Yes    Myofascial pain syndrome of lumbar spine     Myofascial pain syndrome, cervical      Physician: Laura Heath NP    Visit Date: 6/25/2024  Physician Orders: PT Eval and Treat   Medical Diagnosis from Referral:   M51.36 (ICD-10-CM) - DDD (degenerative disc disease), lumbar   M96.1 (ICD-10-CM) - Postlaminectomy syndrome of lumbar region   M50.30 (ICD-10-CM) - DDD (degenerative disc disease), cervical      Evaluation Date: 5/21/2024  Authorization Period Expiration: 12/31/2024  Plan of Care Expiration: 8/21/2024  Progress Note Due: 7/21/2024  Date of Surgery: n/a   Visit # / Visits authorized: 7/12   FOTO: 1/ 3     Precautions: Standard      Time In: 1115 am    Time Out:  1210     Total Billable Time: 40 minutes    PTA Visit #: 0/5       Subjective     Patient reports: I feel like there is some type of inflammation going on in my left shoulder blade area, I feel like it's swollen, I feel like there is "heat" in the tissue.  Wondering if some type of steroid pack would help.  Alessia stated that she has decided to go to [facility] to get the biopsy of her lung.  Even though the PET scan was negative, she wants to be sure.    She was compliant with home exercise program.  Response to previous treatment: ok  Functional change: improved from last visit; no sharp pain with movement today    Pain: 5-6/10  Location: bilateral neck, thoracic/scapular area      Objective      Objective Measures updated at progress report unless specified.     Range of Motion/Strength:   ( 6/25/2024)   Cervical A/PROM: Pain/Dysfunction with Movement:   Flexion                         38*/48*     Extension                    25*/32*     Right side bending      20*/25*     Left side " bending        15*/25*     Right rotation              30*/35*  limited with discomfort    Left rotation                30*/35*  limited with pain Left upper trap           Treatment     Alessia received the treatments listed below:      therapeutic exercises to develop strength, ROM, flexibility, and posture for 10 minutes including:  UBE - x 10 mins - forward/backward no resistance   Scapular retraction in sitting   Upper trap stretching, levator stretching x 30 sec x 2     manual therapy techniques: Myofacial release and Soft tissue Mobilization Trigger point dry needling were applied to the: cervical/thoracic spine  for 30 minutes, including:  Seated - Palpation of trigger points x 2 in left upper trap - 50 mm needling with twitch response noted, pistoning of needle into trigger point then stimulated x 10 mins.  Removed needles without adverse effects.  IASTM to upper trap, levator and rhomboid tissue in sitting with neck lateral SB to Right to assist in myofascial stretch/release; S/L on left - scapular mobilization in to all planes with rotation of thoracic spine.     neuromuscular re-education activities to improve: Posture for 0 minutes. The following activities were included:      therapeutic activities to improve functional performance for 0  minutes, including:    Patient Education and Home Exercises       Education provided:   - Reviewed HEP for upper trap, levator and scalene tretching     Written Home Exercises Provided: Patient instructed to cont prior HEP. Exercises were reviewed and Alessia was able to demonstrate them prior to the end of the session.  Alessia demonstrated good  understanding of the education provided. See Electronic Medical Record under Patient Instructions for exercises provided during therapy sessions    Assessment     Alessia noting improvement in neck/left scapular pain;  Responds well to manual tx with MFR/Dry Needling, Initiated some exercise today; needs to continue to work at home      Alessia Is progressing well towards her goals.   Patient prognosis is Good.     Patient will continue to benefit from skilled outpatient physical therapy to address the deficits listed in the problem list box on initial evaluation, provide pt/family education and to maximize pt's level of independence in the home and community environment.     Patient's spiritual, cultural and educational needs considered and pt agreeable to plan of care and goals.     Anticipated barriers to physical therapy: none     Goals:   Short Term Goals: 3 weeks   Demonstrate improvement in recent symptoms to progress toward long term goals  > Minimal progress   Improve sitting/standing postural deficits to reduce pain and promote postural awareness for injury prevention.  > Improving   Demonstrate compliance with initial exercise program and manual tx.  > Improving.      Long Term Goals:  6 weeks   Demonstrate improvement in cervical AROM with less pain   Demonstrate improvement in activity tolerance   Ambulate community required distances without increased symptoms   FOTO score improvement to  Independent with HEP for continued improvement in function.     Plan     Plan of care Certification: 5/21/2024 to 8/21/2024.     Outpatient Physical Therapy 1-2 times weekly for 6 weeks to include the following interventions: Manual Therapy, Neuromuscular Re-ed, Patient Education, Therapeutic Activities, and Therapeutic Exercise.        Trinh Reyes, PT

## 2024-07-01 RX ORDER — ESCITALOPRAM OXALATE 20 MG/1
20 TABLET ORAL NIGHTLY
Qty: 90 TABLET | Refills: 3 | Status: SHIPPED | OUTPATIENT
Start: 2024-07-01

## 2024-07-01 NOTE — TELEPHONE ENCOUNTER
No care due was identified.  [facility] Embedded Care Due Messages. Reference number: [id number].   6/30/2024 11:57:53 PM CDT

## 2024-07-01 NOTE — TELEPHONE ENCOUNTER
Refill Decision Note   Alessia Omar  is requesting a refill authorization.  Brief Assessment and Rationale for Refill:  Approve     Medication Therapy Plan:         Comments:     Note composed:2:06 AM 07/01/2024

## 2024-07-02 ENCOUNTER — CLINICAL SUPPORT (OUTPATIENT)
Dept: REHABILITATION | Facility: HOSPITAL | Age: 69
End: 2024-07-02
Payer: MEDICARE

## 2024-07-02 DIAGNOSIS — Z74.09 IMPAIRED FUNCTIONAL MOBILITY AND ACTIVITY TOLERANCE: Primary | ICD-10-CM

## 2024-07-02 DIAGNOSIS — M79.18 MYOFASCIAL PAIN SYNDROME, CERVICAL: ICD-10-CM

## 2024-07-02 DIAGNOSIS — M79.18 MYOFASCIAL PAIN SYNDROME OF LUMBAR SPINE: ICD-10-CM

## 2024-07-02 PROCEDURE — 97140 MANUAL THERAPY 1/> REGIONS: CPT | Mod: KX,PN

## 2024-07-02 PROCEDURE — 97014 ELECTRIC STIMULATION THERAPY: CPT | Mod: KX,PN

## 2024-07-02 PROCEDURE — 97110 THERAPEUTIC EXERCISES: CPT | Mod: KX,PN

## 2024-07-02 NOTE — PROGRESS NOTES
[facility]   Physical Therapy Treatment Note       Name: Alessia Nelson  Clinic Number: [id number]    Therapy Diagnosis:   Encounter Diagnoses   Name Primary?    Impaired functional mobility and activity tolerance Yes    Myofascial pain syndrome of lumbar spine     Myofascial pain syndrome, cervical      Physician: Laura Heath NP    Visit Date: 7/2/2024  Physician Orders: PT Eval and Treat   Medical Diagnosis from Referral:   M51.36 (ICD-10-CM) - DDD (degenerative disc disease), lumbar   M96.1 (ICD-10-CM) - Postlaminectomy syndrome of lumbar region   M50.30 (ICD-10-CM) - DDD (degenerative disc disease), cervical      Evaluation Date: 5/21/2024  Authorization Period Expiration: 12/31/2024  Plan of Care Expiration: 8/21/2024  Progress Note Due: 7/21/2024  Date of Surgery: n/a   Visit # / Visits authorized: 7/12   FOTO: 1/ 3     Precautions: Standard      Time In: 2:00 pm     Time Out:  3:00       Total Billable Time: 50 minutes    PTA Visit #: 0/5       Subjective     Patient reports: I'm doing better today, I don't know what happened, but yesterday I started to feel better.  Has been stretching, getting in pool.     She was compliant with home exercise program.  Response to previous treatment: ok  Functional change: improved from last visit; no sharp pain with movement today    Pain: 5-6/10  Location: bilateral neck, thoracic/scapular area      Objective      Objective Measures updated at progress report unless specified.     Range of Motion/Strength:   ( 6/25/2024)   Cervical A/PROM: Pain/Dysfunction with Movement:   Flexion                         38*/48*     Extension                    25*/32*     Right side bending      20*/25*     Left side bending        15*/25*     Right rotation              30*/35*  limited with discomfort    Left rotation                30*/35*  limited with pain Left upper trap           Treatment     Alessia received the treatments listed below:       therapeutic exercises to develop strength, ROM, flexibility, and posture for 15 minutes including:  UBE - x 10 mins - forward/backward no resistance   Scapular retraction in sitting   Upper trap stretching, levator stretching x 30 sec x 2     manual therapy techniques: Myofacial release and Soft tissue Mobilization Trigger point dry needling were applied to the: cervical/thoracic spine  for 30 minutes, including:  Supine: cervical sub-occipital inhibition with light traction applied; MET for upper traps,scalenes on each side; facet uplift from mid thoracic to upper cervical for general mobility on each side; S/L on Right - left scapular mobilization in to all planes with some thoracic rotation.  Remained in S/L - pillow to hug and pillow between knees; LF-ES to medial border of left scapula using 50 mm needles, left upper trap trigger points, T3 - T6 paraspinals 1/5 fb lateral to SP's with 40 mm needles.  Clockwise winding of all needles for increased tissue grasp. Stimulation adjusted to patient tolerance, good rhythmical contraction of tissue noted.  Needles left in situ x 15 min. Removed needles without adverse events.     neuromuscular re-education activities to improve: Posture for 0 minutes. The following activities were included:      therapeutic activities to improve functional performance for 0  minutes, including:    Patient Education and Home Exercises       Education provided:   - Reviewed HEP for upper trap, levator and scalene tretching     Written Home Exercises Provided: Patient instructed to cont prior HEP. Exercises were reviewed and Alessia was able to demonstrate them prior to the end of the session.  Alessia demonstrated good  understanding of the education provided. See Electronic Medical Record under Patient Instructions for exercises provided during therapy sessions    Assessment     Alessia noting improvement in neck/left scapular pain;  Responds well to manual tx with MFR/Dry Needling,  Initiated some exercise today; needs to continue to work at home     Alessia Is progressing well towards her goals.   Patient prognosis is Good.     Patient will continue to benefit from skilled outpatient physical therapy to address the deficits listed in the problem list box on initial evaluation, provide pt/family education and to maximize pt's level of independence in the home and community environment.     Patient's spiritual, cultural and educational needs considered and pt agreeable to plan of care and goals.     Anticipated barriers to physical therapy: none     Goals:   Short Term Goals: 3 weeks   Demonstrate improvement in recent symptoms to progress toward long term goals  > Minimal progress   Improve sitting/standing postural deficits to reduce pain and promote postural awareness for injury prevention.  > Improving   Demonstrate compliance with initial exercise program and manual tx.  > Improving.      Long Term Goals:  6 weeks   Demonstrate improvement in cervical AROM with less pain   Demonstrate improvement in activity tolerance   Ambulate community required distances without increased symptoms   FOTO score improvement to  Independent with HEP for continued improvement in function.     Plan     Plan of care Certification: 5/21/2024 to 8/21/2024.     Outpatient Physical Therapy 1-2 times weekly for 6 weeks to include the following interventions: Manual Therapy, Neuromuscular Re-ed, Patient Education, Therapeutic Activities, and Therapeutic Exercise.        Trinh Reyes, PT

## 2024-07-09 ENCOUNTER — CLINICAL SUPPORT (OUTPATIENT)
Dept: REHABILITATION | Facility: HOSPITAL | Age: 69
End: 2024-07-09
Payer: MEDICARE

## 2024-07-09 DIAGNOSIS — M79.18 MYOFASCIAL PAIN SYNDROME, CERVICAL: ICD-10-CM

## 2024-07-09 DIAGNOSIS — M79.18 MYOFASCIAL PAIN SYNDROME OF LUMBAR SPINE: ICD-10-CM

## 2024-07-09 DIAGNOSIS — Z74.09 IMPAIRED FUNCTIONAL MOBILITY AND ACTIVITY TOLERANCE: Primary | ICD-10-CM

## 2024-07-09 PROCEDURE — 97140 MANUAL THERAPY 1/> REGIONS: CPT | Mod: KX,PN

## 2024-07-09 PROCEDURE — 97110 THERAPEUTIC EXERCISES: CPT | Mod: KX,PN

## 2024-07-09 PROCEDURE — 97014 ELECTRIC STIMULATION THERAPY: CPT | Mod: KX,PN

## 2024-07-09 NOTE — PROGRESS NOTES
[facility]   Physical Therapy Treatment Note       Name: Alessia Nelson  Clinic Number: [id number]    Therapy Diagnosis:   Encounter Diagnoses   Name Primary?    Impaired functional mobility and activity tolerance Yes    Myofascial pain syndrome of lumbar spine     Myofascial pain syndrome, cervical      Physician: Laura Heath NP    Visit Date: 7/9/2024  Physician Orders: PT Eval and Treat   Medical Diagnosis from Referral:   M51.36 (ICD-10-CM) - DDD (degenerative disc disease), lumbar   M96.1 (ICD-10-CM) - Postlaminectomy syndrome of lumbar region   M50.30 (ICD-10-CM) - DDD (degenerative disc disease), cervical      Evaluation Date: 5/21/2024  Authorization Period Expiration: 12/31/2024  Plan of Care Expiration: 8/21/2024  Progress Note Due: 7/21/2024  Date of Surgery: n/a   Visit # / Visits authorized: 9/12   FOTO: 1/ 3     Precautions: Standard      Time In: 1:15 pm     Time Out:  2:15 pm        Total Billable Time: 50 minutes    PTA Visit #: 0/5       Subjective     Patient reports:  I'm about the same, really not much better; Alessia stated that she is going to call [facility] and get an appointment with one of the MD's for opinion on her neck.   She was compliant with home exercise program.  Response to previous treatment: ok  Functional change: not much improvement over the past couple of days     Pain: 5-6/10  Location: bilateral neck, thoracic/scapular area      Objective      Objective Measures updated at progress report unless specified.      Treatment     Alessia received the treatments listed below:      therapeutic exercises to develop strength, ROM, flexibility, and posture for 15 minutes including:  UBE - x 10 mins - forward/backward no resistance   Scapular retraction in sitting   Upper trap stretching, levator stretching x 30 sec x 2     manual therapy techniques: Myofacial release and Soft tissue Mobilization Trigger point dry needling were applied to the:  cervical/thoracic spine  for 30 minutes, including:  Supine: cervical sub-occipital inhibition with light traction applied; MET for upper traps,scalenes on each side; facet uplift from mid thoracic to upper cervical for general mobility on each side; S/L on Right - left scapular mobilization in to all planes with some thoracic rotation.  Remained in S/L - pillow to hug and pillow between knees; LF-ES to medial border of left scapula using 50 mm needles, left upper trap trigger points, T3 - T6 paraspinals 1/5 fb lateral to SP's with 40 mm needles.  Clockwise winding of all needles for increased tissue grasp. Stimulation adjusted to patient tolerance, good rhythmical contraction of tissue noted.  Needles left in situ x 15 min. Removed needles without adverse events.     neuromuscular re-education activities to improve: Posture for 0 minutes. The following activities were included:      therapeutic activities to improve functional performance for 0  minutes, including:    Patient Education and Home Exercises       Education provided:   - Reviewed HEP for upper trap, levator and scalene tretching     Written Home Exercises Provided: Patient instructed to cont prior HEP. Exercises were reviewed and Alessia was able to demonstrate them prior to the end of the session.  Alessia demonstrated good  understanding of the education provided. See Electronic Medical Record under Patient Instructions for exercises provided during therapy sessions    Assessment     Alessia reporting only temporary improvement in her neck and left upper trap/shoulder pain; Feels better after therapy, but doesn't last.  Trying to be more active at home to help herself.   Responds well to manual tx with MFR/Dry Needling, but only giving her temporary relief.     Alessia Is progressing well towards her goals.   Patient prognosis is Good.     Patient will continue to benefit from skilled outpatient physical therapy to address the deficits listed in the problem  list box on initial evaluation, provide pt/family education and to maximize pt's level of independence in the home and community environment.     Patient's spiritual, cultural and educational needs considered and pt agreeable to plan of care and goals.     Anticipated barriers to physical therapy: none     Goals:   Short Term Goals: 3 weeks   Demonstrate improvement in recent symptoms to progress toward long term goals  > Minimal progress   Improve sitting/standing postural deficits to reduce pain and promote postural awareness for injury prevention.  > Improving   Demonstrate compliance with initial exercise program and manual tx.  > Improving.      Long Term Goals:  6 weeks   Demonstrate improvement in cervical AROM with less pain   Demonstrate improvement in activity tolerance   Ambulate community required distances without increased symptoms   FOTO score improvement to  Independent with HEP for continued improvement in function.     Plan     Plan of care Certification: 5/21/2024 to 8/21/2024.     Outpatient Physical Therapy 1-2 times weekly for 6 weeks to include the following interventions: Manual Therapy, Neuromuscular Re-ed, Patient Education, Therapeutic Activities, and Therapeutic Exercise.        Trinh Reyes, PT

## 2024-07-12 ENCOUNTER — OFFICE VISIT (OUTPATIENT)
Dept: FAMILY MEDICINE | Facility: CLINIC | Age: 69
End: 2024-07-12
Payer: MEDICARE

## 2024-07-12 ENCOUNTER — CLINICAL SUPPORT (OUTPATIENT)
Dept: REHABILITATION | Facility: HOSPITAL | Age: 69
End: 2024-07-12
Payer: MEDICARE

## 2024-07-12 VITALS
SYSTOLIC BLOOD PRESSURE: 134 MMHG | WEIGHT: 195.5 LBS | OXYGEN SATURATION: 97 % | HEART RATE: 92 BPM | RESPIRATION RATE: 16 BRPM | HEIGHT: 67 IN | DIASTOLIC BLOOD PRESSURE: 70 MMHG | BODY MASS INDEX: 30.69 KG/M2
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
## 2024-07-12 DIAGNOSIS — M79.18 MYOFASCIAL PAIN SYNDROME, CERVICAL: ICD-10-CM

## 2024-07-12 DIAGNOSIS — Z86.19 HISTORY OF CLOSTRIDIUM DIFFICILE INFECTION: Primary | ICD-10-CM

## 2024-07-12 DIAGNOSIS — Z74.09 IMPAIRED FUNCTIONAL MOBILITY AND ACTIVITY TOLERANCE: Primary | ICD-10-CM

## 2024-07-12 DIAGNOSIS — M79.18 MYOFASCIAL PAIN SYNDROME OF LUMBAR SPINE: ICD-10-CM

## 2024-07-12 DIAGNOSIS — M65.9 SYNOVITIS: ICD-10-CM

## 2024-07-12 PROCEDURE — 97140 MANUAL THERAPY 1/> REGIONS: CPT | Mod: KX,PN

## 2024-07-12 PROCEDURE — 97014 ELECTRIC STIMULATION THERAPY: CPT | Mod: KX,PN

## 2024-07-12 PROCEDURE — 99999 PR PBB SHADOW E&M-EST. PATIENT-LVL V: CPT | Mod: PBBFAC,,, | Performed by: STUDENT IN AN ORGANIZED HEALTH CARE EDUCATION/TRAINING PROGRAM

## 2024-07-12 PROCEDURE — 99215 OFFICE O/P EST HI 40 MIN: CPT | Mod: PBBFAC,PN | Performed by: STUDENT IN AN ORGANIZED HEALTH CARE EDUCATION/TRAINING PROGRAM

## 2024-07-12 NOTE — PROGRESS NOTES
Subjective:       Patient ID: Alessia Nelson is a 68 y.o. female.    Chief Complaint: swollen finger    1 month history of left 3rd pip joint/distal finger is swollen, red, painful.  Has some range of motion but not as full as the other fingers.  No trauma.  No injury.  No splinter in it.  She does have a lemon tree with thorns  No drainage.  She has a history of gout but is on allopurinol and doesn't feel like gout.          .  Patient Active Problem List   Diagnosis    CHF, chronic    HTN (hypertension), onset in her 20s    COPD (chronic obstructive pulmonary disease)    Nicotine dependence    Diarrhea, chronic    Allergic rhinitis    Mild episode of recurrent major depressive disorder    Fibromyalgia    GERD (gastroesophageal reflux disease)    Hypothyroidism    Hyperlipidemia    History of malignant neoplasm of skin    MEN (multiple endocrine neoplasia)    Peripheral neuropathy    Poikiloderma    Pseudophakia    Rotator cuff injury    Tendinosis    Metatarsalgia    Dry eyes    Class 1 obesity due to excess calories with serious comorbidity and body mass index (BMI) of 30.0 to 30.9 in adult, today 29.1    Chronic bilateral low back pain without sciatica    Contusion of tail of pancreas    Bile duct abnormality    Lumbosacral spondylosis    S/P drug eluting coronary stent placement, 8/2017    Family history of premature CAD    Syncope and collapse, onset 2015, about 10 times, one episode noted hypotension at doctor office 9/2017    Multiple falls, started 9/2018, 5 times usually on standing    Excessive caffeine intake    Orthostatic hypotension    Abdominal obesity    Aspirin intolerance    LVH (left ventricular hypertrophy) due to hypertensive disease, with heart failure    History of cholecystectomy    Diverticulosis of large intestine without hemorrhage    Abnormal computerized tomography of biliary tract    History of pancreatitis    Primary osteoarthritis of right knee    Degenerative tear of medial  meniscus of right knee    Baker's cyst of knee, right    Chronic pain of right knee    Unilateral primary osteoarthritis, right knee    CAD (coronary artery disease)    History of TIA (transient ischemic attack)    Status post right knee replacement    Anemia    Hypomagnesemia    Bradycardia    Chronic pancreatitis    Colon cancer screening    B12 deficiency    History of Clostridium difficile infection    Abnormal finding on GI tract imaging    Pancreatic insufficiency    Degenerative disc disease, lumbar    Current smoker    Failed back syndrome of lumbar spine    Chronic cervical pain    Activity intolerance    Sacroiliitis    Hypothyroidism due to acquired atrophy of thyroid    Pain of lumbar facet joint    Non-refractory chronic migraine without aura    Lumbar radiculopathy    Isolated cervical dystonia    Gout    Generalized anxiety disorder    Stage 3b chronic kidney disease    Pharyngoesophageal dysphagia    Hoarseness, persistent    Chronic bronchitis    Bilateral carpal tunnel syndrome    Major depressive disorder, single episode, moderate    Aortic atherosclerosis    Impaired functional mobility and activity tolerance    Myofascial pain syndrome of lumbar spine    Myofascial pain syndrome, cervical    Solitary pulmonary nodule     Alessia has a current medication list which includes the following prescription(s): acetaminophen, allopurinol, arexvy (pf), azelastine, calcium citrate-vitamin d3 315-200 mg, clopidogrel, colestipol, cyanocobalamin, escitalopram oxalate, fluticasone propionate, furosemide, gabapentin, hydrocodone-acetaminophen, levothyroxine, magnesium oxide, methocarbamol, metoprolol succinate, mupirocin, ondansetron, pantoprazole, potassium chloride sa, rosuvastatin, trazodone, and zenpep, and the following Facility-Administered Medications: electrolyte-s (ph 7.4), fentanyl, hydromorphone, lactated ringers, lactated ringers, lactated ringers, lactated ringers, lactated ringers, lidocaine (pf)  10 mg/ml (1%), lidocaine (pf) 10 mg/ml (1%), lorazepam, oxycodone, prochlorperazine, and sodium chloride 0.9%.    Review of Systems   Constitutional:  Negative for activity change and appetite change.   Respiratory:  Negative for shortness of breath.    Cardiovascular:  Negative for chest pain.   Gastrointestinal:  Negative for abdominal pain.   Genitourinary:  Negative for dysuria.   Musculoskeletal:         Hand pain   Integumentary:  Negative for rash.   Psychiatric/Behavioral:  Negative for dysphoric mood and sleep disturbance. The patient is not nervous/anxious.          Health Maintenance Due   Topic Date Due    Hemoglobin A1c (Diabetic Prevention Screening)  Never done    Shingles Vaccine (1 of 2) Never done    RSV Vaccine (Age 60+ and Pregnant patients) (1 - 1-dose 60+ series) Never done    Mammogram  03/10/2022    Pneumococcal Vaccines (Age 65+) (2 of 2 - PCV) 06/01/2023    COVID-19 Vaccine (4 - 2023-24 season) 09/01/2023      Health Maintenance reviewed and discussed- patient asked to schedule her mammogram- ordered, encouraged vaccines.     Objective:      Physical Exam  Constitutional:       General: She is not in acute distress.     Appearance: Normal appearance. She is not ill-appearing.   Eyes:      Conjunctiva/sclera: Conjunctivae normal.   Cardiovascular:      Rate and Rhythm: Normal rate and regular rhythm.      Heart sounds: Normal heart sounds. No murmur heard.  Pulmonary:      Effort: Pulmonary effort is normal. No respiratory distress.      Breath sounds: Normal breath sounds.   Musculoskeletal:        Hands:       Right lower leg: No edema.      Left lower leg: No edema.      Comments: No warmth. No tenderness   Skin:     General: Skin is warm and dry.   Neurological:      Mental Status: She is alert. Mental status is at baseline.      Gait: Gait normal.   Psychiatric:         Mood and Affect: Mood normal.         Behavior: Behavior normal.         Thought Content: Thought content normal.          Judgment: Judgment normal.                 Assessment:       1. History of Clostridium difficile infection    2. Synovitis    3. Exposure to other specified factors, initial encounter        Plan:       1. History of Clostridium difficile infection  Assessment & Plan:  Multiple history  Avoid antibiotics unless absolutely necessary      2. Synovitis  -     CLARK Screen w/Reflex; Future; Expected date: 07/12/2024  -     C-Reactive Protein; Future; Expected date: 07/12/2024  -     Rheumatoid Factor; Future; Expected date: 07/12/2024  -     Sedimentation rate; Future; Expected date: 07/12/2024  -     Uric Acid; Future; Expected date: 07/12/2024  -     X-Ray Hand Complete Left; Future; Expected date: 07/12/2024  -     CBC Auto Differential; Future; Expected date: 07/12/2024    3. Exposure to other specified factors, initial encounter  -     CBC Auto Differential; Future; Expected date: 07/12/2024

## 2024-07-13 VITALS
BODY MASS INDEX: 30.76 KG/M2 | WEIGHT: 196 LBS | DIASTOLIC BLOOD PRESSURE: 78 MMHG | OXYGEN SATURATION: 97 % | HEIGHT: 67 IN | SYSTOLIC BLOOD PRESSURE: 134 MMHG | HEART RATE: 63 BPM | RESPIRATION RATE: 16 BRPM

## 2024-07-13 NOTE — PROGRESS NOTES
Patient ID:  Alessia Nelson  68 y.o.  female      Assessment:       1. Atherosclerosis of native coronary artery of native heart without angina pectoris    2. Primary hypertension          Plan:     Continue Plavix 75 mg daily and Crestor 10 mg daily. She is allergic aspirin. Obtain lipid panel. LDL goal <70.  Continue Toprol 50 mg daily.  Continue Lasix 20 mg daily. She likely has some diastolic dysfunction.  Counseled on heart healthy lifestyle and ASCVD risk factor control.    Obtain echo. Last one is from 2020.  She is seeing pulmonology for the lung nodules. Also scheduled to have PFTs.     Subjective:     Chief Complaint   Patient presents with    Follow-up     She had a abnormal ct chest, she is headed to [facility], nodules on her lungs.     Shortness of Breath       HPI:  Alessia Nelson is a 68 y.o. female with a history of coronary artery disease status post PCI in 2017, hypertension, CKD, possible COPD and hyperlipidemia.     She presents today for follow up. She has mild shortness of breath with exertion that is chronic with no recent worsening.  Denies any chest pain with exertion or at rest.  Denies any shortness of breath at rest, dizziness, syncope or near-syncope or palpitations. Reports slight ankle edema occasionally. She has been experiencing abdominal pain and bloating. She is being worked up for it by PCP. She also has chronic back pain. She had a CT chest recently which showed lung nodules. She is seeing pulmonology.    PREVIOUS CARDIAC TESTING/PROCEDURES HISTORY:  Most Recent Echocardiogram Results  Results for orders placed during the hospital encounter of 09/23/20     Echo Color Flow Doppler? Yes; Bubble Contrast? No     Interpretation Summary  · There is left ventricular concentric remodeling.  · The left ventricle is normal in size with normal systolic function. The estimated ejection fraction is 63%.  · Normal left ventricular diastolic function.  · Normal right ventricular  systolic function.  · No pulmonary hypertension  · Normal central venous pressure (3 mmHg).        Most Recent Stress Test Results  Results for orders placed during the hospital encounter of 09/28/20    Nuclear Stress - Cardiology Interpreted    Interpretation Summary    The study shows normal myocardial perfusion.    The perfusion scan is free of evidence from myocardial ischemia or injury.    Gated perfusion images showed an ejection fraction of % at rest and 84% post stress. Normal ejection fraction is greater than %.    The EKG portion of this study is negative for ischemia.    The patient reported no chest pain during the stress test.    There were no arrhythmias during stress.      Most Recent Cardiac PET Stress Test Results  No results found for this or any previous visit.      Most Recent Cardiovascular Angiogram results  No results found for this or any previous visit.      Other Most Recent Cardiology Results  Results for orders placed during the hospital encounter of 11/15/23    30 Day Outpatient Telemetry    Narrative    Predominant underlying rhythm was Sinus Rhythm.  Minimum sinus rate of 42 beats per minute, maximum sinus rate of 123 beats per minute and average of 58 beats per minute.    Overall min HR of 42 bpm, max HR of 136 bpm, and avg HR of 58 bpm.    Multiple episodes of narrow-complex supraventricular tachycardia runs occurred, the run with the fastest interval lasting 7 beats with a max rate of 136 bpm, the longest lasting 19 beats with an avg rate of 107 bpm.    Isolated SVEs were rare (<1.0%, 703), SVE Couplets were rare (<1.0%, 211), and SVE Triplets were rare (<1.0%, 209).    Isolated VEs were rare (<1.0%, 1), and no VE Couplets or VE Triplets were present.    There were no patient reported events.    There were 0 auto-triggered events.    See full report    Most Recent EKG Results  Results for orders placed or performed during the hospital encounter of 01/02/24   SCHEDULED EKG 12-LEAD  "(to Muse)    Collection Time: 01/02/24  9:19 AM    Narrative    Test Reason : Z01.811,    Vent. Rate : 064 BPM     Atrial Rate : 064 BPM     P-R Int : 162 ms          QRS Dur : 092 ms      QT Int : 422 ms       P-R-T Axes : 077 050 076 degrees     QTc Int : 435 ms    Normal sinus rhythm  Cannot rule out Anterior infarct ,age undetermined  Abnormal ECG  When compared with ECG of 15-NOV-2023 11:26,  No significant change was found  Confirmed by Jovanni Serrano MD ([id number]) on 1/2/2024 3:48:24 PM    Referred By: MODESTO GALAVIZ           Confirmed By:Jovanni Serrano MD       The ASCVD Risk score (Flethcer DK, et al., 2019) failed to calculate for the following reasons:    The patient has a prior MI or stroke diagnosis    Review of patient's allergies indicates:   Allergen Reactions    Aspirin Other (See Comments)     "Makes stomach feel like it's on fire"    Phenergan [promethazine] Other (See Comments)     SEIZURES    Lortab [hydrocodone-acetaminophen] Itching     Able to take generic Norco       Past Medical History:   Diagnosis Date    Acute pancreatitis     Back pain     CAD (coronary artery disease)     CHF (congestive heart failure)     COPD (chronic obstructive pulmonary disease)     Depression     Diverticulosis     Encounter for blood transfusion     GERD (gastroesophageal reflux disease)     History of blood clots     1986 AFTER BOWEL RESCETION    History of bowel resection     Hyperlipidemia     Hypertension     Peritonitis     Pulmonary nodule     Seizures     HAD A SEIZURE FROM PHENERGAN     Stroke     Thyroid disease     TIA (transient ischemic attack)     Wears dentures     upper     Past Surgical History:   Procedure Laterality Date    ADRENAL GLAND SURGERY      APPENDECTOMY      BACK SURGERY      CAUDAL EPIDURAL STEROID INJECTION N/A 3/1/2021    Procedure: Injection-steroid-epidural-caudal;  Surgeon: Socorro Lorenz MD;  Location: [facility];  Service: Pain Management;  Laterality: N/A;    CHOLECYSTECTOMY      COLONOSCOPY   "    COLONOSCOPY N/A 6/10/2021    Procedure: COLONOSCOPY;  Surgeon: Sheree Metz MD;  Location: [facility] ENDO;  Service: Endoscopy;  Laterality: N/A;    COLONOSCOPY N/A 3/8/2022    Procedure: COLONOSCOPY;  Surgeon: Maurilio Rodriguez MD;  Location: [facility] (2ND FLR);  Service: Endoscopy;  Laterality: N/A;  Pt to perform at-home COVID test morning of procedure-DS  2/24-Blood thinner approval [facility] Dr. Walsh (see letters tab 2/24/22)-DS  3/2-Instructions sent via email-DS  3/7-Pt unable to come earlier due to ride-DS    CORONARY STENT PLACEMENT      CYSTOURETHROSCOPY N/A 11/13/2019    Procedure: CYSTOURETHROSCOPY;  Surgeon: Shun Nuñez MD;  Location: [facility] OR;  Service: Urology;  Laterality: N/A;    DIRECT DIAGNOSTIC LARYNGOSCOPY WITH BRONCHOSCOPY AND ESOPHAGOSCOPY N/A 1/3/2024    Procedure: LARYNGOSCOPY, DIRECT, DIAGNOSTIC, WITH BRONCHOSCOPY AND ESOPHAGOSCOPY;  Surgeon: Mir Belle MD;  Location: [facility] OR;  Service: ENT;  Laterality: N/A;    ENDOSCOPIC ULTRASOUND OF LOWER GASTROINTESTINAL TRACT N/A 3/8/2022    Procedure: ULTRASOUND, LOWER GI TRACT, ENDOSCOPIC;  Surgeon: Maurilio Rodriguez MD;  Location: [facility] (2ND FLR);  Service: Endoscopy;  Laterality: N/A;  Pt to perform at-home COVID test morning of procedure-DS  [facility]DS  3/2-Instructions sent via email-DS  3/7-Pt unable to come earlier due to ride-DS    ENDOSCOPIC ULTRASOUND OF UPPER GASTROINTESTINAL TRACT N/A 11/25/2019    Procedure: ULTRASOUND, UPPER GI TRACT, ENDOSCOPIC;  Surgeon: Alhaji Bridges MD;  Location: [facility] (2ND FLR);  Service: Endoscopy;  Laterality: N/A;  5 day hold Plavix, Dr Jovanni Serrano - pg  PM prep    ENDOSCOPIC ULTRASOUND OF UPPER GASTROINTESTINAL TRACT N/A 11/2/2020    Procedure: ULTRASOUND, UPPER GI TRACT, ENDOSCOPIC;  Surgeon: Maurilio Rodriguez MD;  Location: [facility] (2ND FLR);  Service: Endoscopy;  Laterality: N/A;  5 day hold Plavix, Dr Roman Walsh -  pg  Covid-19 test 10/30/20 at [facility] [place] operated by [facility] pg    EPIDURAL STEROID INJECTION INTO CERVICAL SPINE N/A 12/8/2021    Procedure: Injection-steroid-epidural-cervical;  Surgeon: Socorro Lorenz MD;  Location: [facility] OR;  Service: Pain Management;  Laterality: N/A;    EPIDURAL STEROID INJECTION INTO CERVICAL SPINE N/A 11/30/2023    Procedure: Injection-steroid-epidural-cervical;  Surgeon: Maurilio Carroll MD;  Location: [facility] OR;  Service: Anesthesiology;  Laterality: N/A;  c7-T1    ESOPHAGOGASTRODUODENOSCOPY N/A 6/10/2021    Procedure: EGD (ESOPHAGOGASTRODUODENOSCOPY);  Surgeon: Sheree Metz MD;  Location: [facility];  Service: Endoscopy;  Laterality: N/A;    ESOPHAGOSCOPY, USING BOUGIE, WITH DILATION N/A 1/3/2024    Procedure: ESOPHAGOSCOPY, USING BOUGIE, WITH DILATION;  Surgeon: Mir Belle MD;  Location: [facility] OR;  Service: ENT;  Laterality: N/A;    FRACTURE SURGERY Left 05/02/2022    ankle    HERNIA REPAIR      HYSTERECTOMY      INCISIONAL HERNIA REPAIR      INJECTION OF ANESTHETIC AGENT AROUND NERVE Right 5/27/2019    Procedure: RIGHT L5-S3 MEDIAL BRANCH BLOCKS;  Surgeon: Sneha Aragon MD;  Location: [facility] OR;  Service: Pain Management;  Laterality: Right;  **DO NOT STOP PLAVIX**    INJECTION OF JOINT Right 9/22/2021    Procedure: Injection, Joint; Right SI Joint Injection;  Surgeon: Socorro Lorenz MD;  Location: [facility] OR;  Service: Pain Management;  Laterality: Right;  Oral    INSERTION OF DORSAL COLUMN NERVE STIMULATOR FOR TRIAL N/A 6/7/2021    Procedure: INSERTION, NEUROSTIMULATOR, SPINAL CORD, DORSAL COLUMN, FOR TRIAL;  Surgeon: Socorro Lorenz MD;  [facility]  Service: [facility] Management;  Laterality: N/A;  nevro rep confirmed start time    instestine      bowel section    KNEE ARTHROPLASTY Right 12/18/2019    Procedure: ARTHROPLASTY, KNEE;  Surgeon: Ike Briceño II, MD;  Location: [facility] OR;  Service: Orthopedics;  Laterality: Right;    KNEE SURGERY  1983    OOPHORECTOMY      PARATHYROID GLAND  SURGERY      3 surgeries    RADIOFREQUENCY ABLATION Right 6/10/2019    Procedure: Radiofrequency Ablation - RIGHT L3-5 RADIOFREQUENCY ABLATION WITH HALYARD COOLIEF THERMAL SYSTEM;  Surgeon: Sneha Aragon MD;  Location: [facility] OR;  Service: Pain Management;  Laterality: Right;  **HOLD PLAVIX x 7 DAYS PRIOR**    SPINAL CORD STIMULATOR REMOVAL N/A 2024    Procedure: REMOVAL, NEUROSTIMULATOR, SPINAL;  Surgeon: Maurilio Carroll MD;  Location: [facility] OR;  Service: Pain Management;  Laterality: N/A;  removal of spinal cord stimulator    UPPER GASTROINTESTINAL ENDOSCOPY       Social History     Tobacco Use    Smoking status: Former     Current packs/day: 0.00     Average packs/day: 1 pack/day for 46.0 years (46.0 ttl pk-yrs)     Types: Cigarettes     Start date: 1977     Quit date: 2023     Years since quittin.5    Smokeless tobacco: Never   Substance Use Topics    Alcohol use: Not Currently    Drug use: No          REVIEW OF SYSTEMS  CONSTITUTIONAL: No chills, no fatigue, no fever.   EYES: No double vision, no blurred vision  NEURO: No headaches, no dizziness  RESPIRATORY: No cough, no wheezing.    CARDIOVASCULAR: See HPI   GI: No abdominal pain, no melena, no diarrhea, no nausea or vomiting.   : No  dysuria and frequency, no hematuria  SKIN: no bruising, no discoloration  ENDOCRINE: no polyphagia, no heat intolerance, no cold intolerance  PSYCHIATRIC: no depression, no anxiety, no memory loss  MUSCULOSKELETAL: no  neck pain, no muscle weakness,no back pain          Objective:        Vitals:    05/15/24 1420   BP: 134/78   Pulse: 63   Resp: 16       PHYSICAL EXAM  CONSTITUTIONAL: In no apparent distress  HEENT: Normocephalic. Pupils normal and conjunctivae normal. No pallor  NECK: No JVD  LUNGS: B/L air entry to the lungs, clear to auscultation. No rales, wheezing or rhonchi.  HEART: Normal rate and regular rhythm. Normal S1 and S2.  No murmur   ABDOMEN: soft, non-tender; bowel sounds normal  EXTREMITIES: No  "edema. Good palpable distal pulses.  NEURO: AAO X 3, no gross sensory or motor deficits  SKIN:  No rash  Psych:  Normal affect    Lab Results   Component Value Date    WBC 7.65 01/02/2024    HGB 11.8 (L) 01/02/2024    HCT 37.8 01/02/2024     01/02/2024    CHOL 131 05/20/2024    TRIG 276 (H) 05/20/2024    HDL 48 05/20/2024    ALT 17 01/02/2024    AST 20 01/02/2024     01/02/2024    K 4.3 01/02/2024     01/02/2024    CREATININE 1.0 01/02/2024    BUN 20 01/02/2024    CO2 24 01/02/2024    TSH 2.932 12/19/2021        @  Lab Results   Component Value Date    CHOL 131 05/20/2024    CHOL 204 (H) 10/04/2023    CHOL 157 07/26/2022    CHOL 157 07/26/2022     Lab Results   Component Value Date    HDL 48 05/20/2024    HDL 51 10/04/2023    HDL 40 07/26/2022    HDL 40 07/26/2022     Lab Results   Component Value Date    LDLCALC 27.8 (L) 05/20/2024    LDLCALC 95.0 10/04/2023    LDLCALC 67.4 07/26/2022    LDLCALC 67.4 07/26/2022     No results found for: "DLDL"  Lab Results   Component Value Date    TRIG 276 (H) 05/20/2024    TRIG 290 (H) 10/04/2023    TRIG 248 (H) 07/26/2022    TRIG 248 (H) 07/26/2022       f1   Lab Results   Component Value Date    CHOLHDL 36.6 05/20/2024    CHOLHDL 25.0 10/04/2023    CHOLHDL 25.5 07/26/2022    CHOLHDL 25.5 07/26/2022            Current Outpatient Medications   Medication Instructions    acetaminophen (TYLENOL) 650 mg, Oral, Every 6 hours PRN    allopurinoL (ZYLOPRIM) 300 mg, Oral    AREXVY, PF, 120 mcg/0.5 mL SusR vaccine     azelastine (ASTELIN) 137 mcg (0.1 %) nasal spray 2 sprays, 2 times daily    calcium citrate-vitamin D3 315-200 mg (CITRACAL+D) 315 mg-5 mcg (200 unit) per tablet 1 tablet, Oral, Daily    clopidogreL (PLAVIX) 75 mg tablet TAKE 1 TABLET EVERY DAY    colestipoL (COLESTID) 2 g, Oral, Daily    cyanocobalamin 1,000 mcg/mL injection INJECT 1 ML IN THE MUSCLE EVERY 14 DAYS    EScitalopram oxalate (LEXAPRO) 20 mg, Oral, Nightly    fluticasone propionate (FLONASE) " 50 mcg/actuation nasal spray 2 sprays, Each Nostril, 2 times daily    furosemide (LASIX) 20 mg, Oral    gabapentin (NEURONTIN) 400 mg, Oral, 2 times daily    HYDROcodone-acetaminophen (NORCO)  mg per tablet 1 tablet, Oral, Every 8 hours PRN    levothyroxine (SYNTHROID) 75 MCG tablet TAKE 1 TABLET DAILY    magnesium oxide (MAG-OX) 400 mg, Oral, Daily    methocarbamoL (ROBAXIN) 750 MG Tab TAKE 1 TABLET EVERY 8 HOURS AS NEEDED FOR MUSCLE SPASMS    metoprolol succinate (TOPROL-XL) 50 mg, Oral, Daily, Pt takes 1/2 tab daily    mupirocin (BACTROBAN) 2 % ointment Topical (Top), 3 times daily    ondansetron (ZOFRAN-ODT) 4 mg, Oral, Every 8 hours PRN    pantoprazole (PROTONIX) 40 MG tablet TAKE 1 TABLET DAILY    potassium chloride SA (KLOR-CON M20) 20 MEQ tablet 20 mEq, Oral, Daily    rosuvastatin (CRESTOR) 10 mg, Oral, Daily    traZODone (DESYREL) 100 MG tablet TAKE 1 TABLET EVERY EVENING    ZENPEP 40,000-126,000- 168,000 unit CpDR Oral                   All pertinent labs, imaging, and EKGs reviewed.  Patient's most recent EKG tracing was personally interpreted by this provider.    Problem List Items Addressed This Visit          Cardiac/Vascular    HTN (hypertension), onset in her 20s (Chronic)     Other Visit Diagnoses       Atherosclerosis of native coronary artery of native heart without angina pectoris    -  Primary    Relevant Orders    Echo (Completed)    Lipid Panel (Completed)            Follow up in about 3 months (around 8/15/2024).

## 2024-07-13 NOTE — PROGRESS NOTES
[facility]   Physical Therapy Treatment Note       Name: Alessia Nelson  Clinic Number: [id number]    Therapy Diagnosis:   Encounter Diagnoses   Name Primary?    Impaired functional mobility and activity tolerance Yes    Myofascial pain syndrome of lumbar spine     Myofascial pain syndrome, cervical        Physician: Laura Heath NP    Visit Date: 7/12/2024  Physician Orders: PT Eval and Treat   Medical Diagnosis from Referral:   M51.36 (ICD-10-CM) - DDD (degenerative disc disease), lumbar   M96.1 (ICD-10-CM) - Postlaminectomy syndrome of lumbar region   M50.30 (ICD-10-CM) - DDD (degenerative disc disease), cervical      Evaluation Date: 5/21/2024  Authorization Period Expiration: 12/31/2024  Plan of Care Expiration: 8/21/2024  Progress Note Due: 7/21/2024  Date of Surgery: n/a   Visit # / Visits authorized: 10/12   FOTO: 1/ 3     Precautions: Standard      Time In: 11:15 pm     Time Out:  12:10 pm        Total Billable Time: 45 minutes    PTA Visit #: 0/5       Subjective     Patient reports:  I'm about the same, I do think that the DN on my upper trap was helpful though.  Alessia has an appointment with MD from [facility] on Tuesday.    She was compliant with home exercise program.  Response to previous treatment: ok  Functional change: not much improvement over the past couple of days, but less tightness/spasm in left upper trap     Pain: 5-6/10  Location: bilateral neck, thoracic/scapular area      Objective      Objective Measures updated at progress report unless specified.      Treatment     Alessia received the treatments listed below:      therapeutic exercises to develop strength, ROM, flexibility, and posture for 15 minutes including:  UBE - x 10 mins - forward/backward no resistance   Scapular retraction in sitting   Upper trap stretching, levator stretching x 30 sec x 2     manual therapy techniques: Myofacial release and Soft tissue Mobilization Trigger point dry needling  were applied to the: cervical/thoracic spine  for 40 minutes, including:  Supine: cervical sub-occipital inhibition with light traction applied; MET for upper traps,scalenes on each side; facet uplift from mid thoracic to upper cervical for general mobility on each side; S/L on Right - left scapular mobilization in to all planes with some thoracic rotation.  Remained in S/L - pillow to hug and pillow between knees; LF-ES to medial border of left scapula using 50 mm needles, left upper trap trigger points, T3 - T6 paraspinals 1/5 fb lateral to SP's with 40 mm needles.  Clockwise winding of all needles for increased tissue grasp. Stimulation adjusted to patient tolerance, good rhythmical contraction of tissue noted.  Needles left in situ x 15 min. Removed needles without adverse events.     neuromuscular re-education activities to improve: Posture for 0 minutes. The following activities were included:      therapeutic activities to improve functional performance for 0  minutes, including:    Patient Education and Home Exercises       Education provided:   - Reviewed HEP for upper trap, levator and scalene tretching     Written Home Exercises Provided: Patient instructed to cont prior HEP. Exercises were reviewed and Alessia was able to demonstrate them prior to the end of the session.  Alessia demonstrated good  understanding of the education provided. See Electronic Medical Record under Patient Instructions for exercises provided during therapy sessions    Assessment     Alessia reporting only temporary improvement in her neck and left upper trap/shoulder pain; Feels better after therapy, but doesn't last.  Trying to be more active at home to help herself.   Responds well to manual tx with MFR/Dry Needling, but only giving her temporary relief.     Alessia Is progressing well towards her goals.   Patient prognosis is Good.     Patient will continue to benefit from skilled outpatient physical therapy to address the deficits  listed in the problem list box on initial evaluation, provide pt/family education and to maximize pt's level of independence in the home and community environment.     Patient's spiritual, cultural and educational needs considered and pt agreeable to plan of care and goals.     Anticipated barriers to physical therapy: none     Goals:   Short Term Goals: 3 weeks   Demonstrate improvement in recent symptoms to progress toward long term goals  > Minimal progress   Improve sitting/standing postural deficits to reduce pain and promote postural awareness for injury prevention.  > Improving   Demonstrate compliance with initial exercise program and manual tx.  > Improving.      Long Term Goals:  6 weeks   Demonstrate improvement in cervical AROM with less pain   Demonstrate improvement in activity tolerance   Ambulate community required distances without increased symptoms   FOTO score improvement to  Independent with HEP for continued improvement in function.     Plan     Plan of care Certification: 5/21/2024 to 8/21/2024.     Outpatient Physical Therapy 1-2 times weekly for 6 weeks to include the following interventions: Manual Therapy, Neuromuscular Re-ed, Patient Education, Therapeutic Activities, and Therapeutic Exercise.        Trinh Reyes, PT

## 2024-07-17 ENCOUNTER — CLINICAL SUPPORT (OUTPATIENT)
Dept: REHABILITATION | Facility: HOSPITAL | Age: 69
End: 2024-07-17
Payer: MEDICARE

## 2024-07-17 ENCOUNTER — HOSPITAL ENCOUNTER (OUTPATIENT)
Dept: RADIOLOGY | Facility: HOSPITAL | Age: 69
Discharge: HOME | End: 2024-07-17
Attending: STUDENT IN AN ORGANIZED HEALTH CARE EDUCATION/TRAINING PROGRAM
Payer: MEDICARE

## 2024-07-17 DIAGNOSIS — M79.18 MYOFASCIAL PAIN SYNDROME, CERVICAL: ICD-10-CM

## 2024-07-17 DIAGNOSIS — M79.18 MYOFASCIAL PAIN SYNDROME OF LUMBAR SPINE: ICD-10-CM

## 2024-07-17 DIAGNOSIS — M65.9 SYNOVITIS: ICD-10-CM

## 2024-07-17 DIAGNOSIS — Z74.09 IMPAIRED FUNCTIONAL MOBILITY AND ACTIVITY TOLERANCE: Primary | ICD-10-CM

## 2024-07-17 PROCEDURE — 97140 MANUAL THERAPY 1/> REGIONS: CPT | Mod: KX,PN

## 2024-07-17 PROCEDURE — 73130 X-RAY EXAM OF HAND: CPT | Mod: 26,LT,, | Performed by: RADIOLOGY

## 2024-07-17 PROCEDURE — 73130 X-RAY EXAM OF HAND: CPT | Mod: TC,PN,LT

## 2024-07-17 NOTE — PROGRESS NOTES
[facility]   Physical Therapy Treatment Note       Name: Alessia Nelson  Clinic Number: [id number]    Therapy Diagnosis:   Encounter Diagnoses   Name Primary?    Impaired functional mobility and activity tolerance Yes    Myofascial pain syndrome of lumbar spine     Myofascial pain syndrome, cervical        Physician: Laura Heath NP    Visit Date: 7/17/2024  Physician Orders: PT Eval and Treat   Medical Diagnosis from Referral:   M51.36 (ICD-10-CM) - DDD (degenerative disc disease), lumbar   M96.1 (ICD-10-CM) - Postlaminectomy syndrome of lumbar region   M50.30 (ICD-10-CM) - DDD (degenerative disc disease), cervical      Evaluation Date: 5/21/2024  Authorization Period Expiration: 12/31/2024  Plan of Care Expiration: 8/21/2024  Progress Note Due: 7/21/2024  Date of Surgery: n/a   Visit # / Visits authorized: 10/12   FOTO: 1/ 3     Precautions: Standard      Time In: 12:30 pm     Time Out:  1: 20  pm        Total Billable Time: 45 minutes    PTA Visit #: 0/5       Subjective     Patient reports:  Saw Spine PA Tuesday, Ordered an MRI of her cervical spine; Referred her to ortho for her Right knee, Patient noting no significant improvement in her neck/shoulder pain.   She was compliant with home exercise program.  Response to previous treatment: ok  Functional change: not much improvement over the past couple of days, but less tightness/spasm in left upper trap ; the only activity that really helps her pain is the pool - hasn't been able to get into it due to rain past several days     Pain: 5-6/10  Location: bilateral neck, thoracic/scapular area      Objective      Objective Measures updated at progress report unless specified.      Treatment     Alessia received the treatments listed below:      therapeutic exercises to develop strength, ROM, flexibility, and posture for 15 minutes including:  UBE - x 10 mins - forward/backward no resistance   Scapular retraction in sitting   Upper  trap stretching, levator stretching x 30 sec x 2     manual therapy techniques: Myofacial release and Soft tissue Mobilization Trigger point dry needling were applied to the: cervical/thoracic spine  for 40 minutes, including:  Supine: cervical sub-occipital inhibition with light traction applied; MET for upper traps,scalenes on each side; facet uplift from mid thoracic to upper cervical for general mobility on each side; S/L on Right - left scapular mobilization in to all planes with some thoracic rotation.  Prone - grade III mobilization in to extension thoracic spine from T1 through T10 - very stiff spine noted, minimal springing; Mobilization of posterior rib articulation at thoracic spine for general mobility; Scapular mobilization of bilateral - in all planes for mobility.       neuromuscular re-education activities to improve: Posture for 0 minutes. The following activities were included:      therapeutic activities to improve functional performance for 0  minutes, including:    Patient Education and Home Exercises       Education provided:   - Reviewed HEP for upper trap, levator and scalene tretching     Written Home Exercises Provided: Patient instructed to cont prior HEP. Exercises were reviewed and Alessia was able to demonstrate them prior to the end of the session.  Alessia demonstrated good  understanding of the education provided. See Electronic Medical Record under Patient Instructions for exercises provided during therapy sessions    Assessment     Alessia reporting only temporary improvement in her neck and left upper trap/shoulder pain; Feels better after therapy, but doesn't last.  Trying to be more active at home to help herself.   Responds well to manual tx with MFR/Dry Needling, but only giving her temporary relief.  Will see for appointment on Friday - then hold off until after her MRI and neuro appointments.     Alessia Is progressing well towards her goals.   Patient prognosis is Good.     Patient  will continue to benefit from skilled outpatient physical therapy to address the deficits listed in the problem list box on initial evaluation, provide pt/family education and to maximize pt's level of independence in the home and community environment.     Patient's spiritual, cultural and educational needs considered and pt agreeable to plan of care and goals.     Anticipated barriers to physical therapy: none     Goals:   Short Term Goals: 3 weeks   Demonstrate improvement in recent symptoms to progress toward long term goals  > Minimal progress   Improve sitting/standing postural deficits to reduce pain and promote postural awareness for injury prevention.  > Improving   Demonstrate compliance with initial exercise program and manual tx.  > Improving.      Long Term Goals:  6 weeks   Demonstrate improvement in cervical AROM with less pain   Demonstrate improvement in activity tolerance   Ambulate community required distances without increased symptoms   FOTO score improvement to  Independent with HEP for continued improvement in function.     Plan     Plan of care Certification: 5/21/2024 to 8/21/2024.     Outpatient Physical Therapy 1-2 times weekly for 6 weeks to include the following interventions: Manual Therapy, Neuromuscular Re-ed, Patient Education, Therapeutic Activities, and Therapeutic Exercise.        Trinh Reyes, PT

## 2024-07-19 ENCOUNTER — CLINICAL SUPPORT (OUTPATIENT)
Dept: REHABILITATION | Facility: HOSPITAL | Age: 69
End: 2024-07-19
Payer: MEDICARE

## 2024-07-19 DIAGNOSIS — M79.18 MYOFASCIAL PAIN SYNDROME OF LUMBAR SPINE: ICD-10-CM

## 2024-07-19 DIAGNOSIS — M79.18 MYOFASCIAL PAIN SYNDROME, CERVICAL: ICD-10-CM

## 2024-07-19 DIAGNOSIS — Z74.09 IMPAIRED FUNCTIONAL MOBILITY AND ACTIVITY TOLERANCE: Primary | ICD-10-CM

## 2024-07-19 PROCEDURE — 97140 MANUAL THERAPY 1/> REGIONS: CPT | Mod: KX,PN

## 2024-07-19 PROCEDURE — 97110 THERAPEUTIC EXERCISES: CPT | Mod: KX,PN

## 2024-07-19 NOTE — PROGRESS NOTES
[facility]   Physical Therapy Treatment Note       Name: Alessia Nelson  Clinic Number: [id number]    Therapy Diagnosis:   Encounter Diagnoses   Name Primary?    Impaired functional mobility and activity tolerance Yes    Myofascial pain syndrome of lumbar spine     Myofascial pain syndrome, cervical        Physician: Laura Heath NP    Visit Date: 7/19/2024  Physician Orders: PT Eval and Treat   Medical Diagnosis from Referral:   M51.36 (ICD-10-CM) - DDD (degenerative disc disease), lumbar   M96.1 (ICD-10-CM) - Postlaminectomy syndrome of lumbar region   M50.30 (ICD-10-CM) - DDD (degenerative disc disease), cervical      Evaluation Date: 5/21/2024  Authorization Period Expiration: 12/31/2024  Plan of Care Expiration: 8/21/2024  Progress Note Due: 7/21/2024  Date of Surgery: n/a   Visit # / Visits authorized: 12/12   FOTO: 1/ 3     Precautions: Standard      Time In: 2:00  pm     Time Out:  2:45   pm        Total Billable Time: 45 minutes    PTA Visit #: 0/5       Subjective     Patient reports: no change in pain, going to see ortho for right knee next week, waiting on appointment for MRI's.  Agreed to suspend physical therapy at this time until she gets further diagnostics and information from neuro.   She was compliant with home exercise program.  Response to previous treatment: ok  Functional change: not much improvement over the past couple of days, but less tightness/spasm in left upper trap ; the only activity that really helps her pain is the pool - hasn't been able to get into it due to rain past several days     Pain:  7/10  Location: bilateral neck, thoracic/scapular area      Objective      Objective Measures updated at progress report unless specified.      Treatment     Alessia received the treatments listed below:      therapeutic exercises to develop strength, ROM, flexibility, and posture for 15 minutes including:  UBE - x 10 mins - forward/backward no resistance    Scapular retraction in sitting   Upper trap stretching, levator stretching x 30 sec x 2     manual therapy techniques: Myofacial release and Soft tissue Mobilization Trigger point dry needling were applied to the: cervical/thoracic spine  for 40 minutes, including:  Supine: cervical sub-occipital inhibition with light traction applied; MET for upper traps,scalenes on each side; facet uplift from mid thoracic to upper cervical for general mobility on each side; S/L on Right - left scapular mobilization in to all planes with some thoracic rotation.  Prone - grade III mobilization in to extension thoracic spine from T1 through T10 - very stiff spine noted, minimal springing; Mobilization of posterior rib articulation at thoracic spine for general mobility; Scapular mobilization of bilateral - in all planes for mobility.       neuromuscular re-education activities to improve: Posture for 0 minutes. The following activities were included:      therapeutic activities to improve functional performance for 0  minutes, including:    Patient Education and Home Exercises       Education provided:   - Reviewed HEP for upper trap, levator and scalene tretching     Written Home Exercises Provided: Patient instructed to cont prior HEP. Exercises were reviewed and Alessia was able to demonstrate them prior to the end of the session.  Alessia demonstrated good  understanding of the education provided. See Electronic Medical Record under Patient Instructions for exercises provided during therapy sessions    Assessment     Alessia reporting only temporary improvement in her neck and left upper trap/shoulder pain; Feels better after therapy, but doesn't last.  Trying to be more active at home to help herself.   Responds well to manual tx with MFR/Dry Needling, but only giving her temporary relief.  Restrictions in all cervical AROM remain, pain levels without improvement.  Will hold off on any further therapy until patient finishes with  neurosurgery appointments.     Alessia Is not progressing  towards her goals.   Patient prognosis is Good.     Patient will continue to benefit from skilled outpatient physical therapy to address the deficits listed in the problem list box on initial evaluation, provide pt/family education and to maximize pt's level of independence in the home and community environment.     Patient's spiritual, cultural and educational needs considered and pt agreeable to plan of care and goals.     Anticipated barriers to physical therapy: none     Goals:   Short Term Goals: 3 weeks   Demonstrate improvement in recent symptoms to progress toward long term goals  > Minimal progress   Improve sitting/standing postural deficits to reduce pain and promote postural awareness for injury prevention.  > slow Improving   Demonstrate compliance with initial exercise program and manual tx.  > Improving.      Long Term Goals:  6 weeks   Demonstrate improvement in cervical AROM with less pain   Demonstrate improvement in activity tolerance   Ambulate community required distances without increased symptoms   FOTO score improvement to  Independent with HEP for continued improvement in function.     Plan     Plan of care Certification: 5/21/2024 to 8/21/2024.     Hold physical therapy at this time.  Lack of improvement - will wait for MRI's and updated neurosurgery opinion.      Trinh Reyes, PT

## 2024-07-29 DIAGNOSIS — R19.7 DIARRHEA, UNSPECIFIED TYPE: ICD-10-CM

## 2024-07-29 RX ORDER — MONTELUKAST SODIUM 4 MG/1
2 TABLET, CHEWABLE ORAL DAILY
Qty: 180 TABLET | Refills: 1 | Status: SHIPPED | OUTPATIENT
Start: 2024-07-29

## 2024-07-29 NOTE — TELEPHONE ENCOUNTER
----- Message from Gilda Ambriz Magdalena sent at 7/29/2024  9:48 AM CDT -----  Type:  RX Refill Request    Who Called:  pt   Refill or New Rx:  refill  RX Name and Strength:  colestipoL (COLESTID) 1 gram Tab  How is the patient currently taking it? (ex. 1XDay):  as directed   Preferred Pharmacy with phone number:    [facility] - [address] [address]  Phone: [phone] Fax: [phone]  Local or Mail Order:  mail  Ordering Provider:  Rufino Silva Call Back Number:  [phone]  Additional Information:  pt stated she would like to have pts needed/ listed rx sent to pharm listed above please ensure to call back to advise with status update asap thanks!

## 2024-07-29 NOTE — TELEPHONE ENCOUNTER
Refill Routing Note   Medication(s) are not appropriate for processing by [facility] for the following reason(s):        No active prescription written by provider    [facility] action(s):  Defer               Appointments  past 12m or future 3m with PCP    Date Provider   Last Visit   7/12/2024 Luana Calvert MD   Next Visit   8/12/2024 Luana Calvert MD   ED visits in past 90 days: 0        Note composed:2:20 PM 07/29/2024

## 2024-07-30 DIAGNOSIS — Z00.00 ENCOUNTER FOR MEDICARE ANNUAL WELLNESS EXAM: ICD-10-CM

## 2024-08-02 ENCOUNTER — OFFICE VISIT (OUTPATIENT)
Dept: FAMILY MEDICINE | Facility: CLINIC | Age: 69
End: 2024-08-02
Payer: MEDICARE

## 2024-08-02 VITALS
WEIGHT: 189.63 LBS | BODY MASS INDEX: 29.76 KG/M2 | RESPIRATION RATE: 18 BRPM | HEART RATE: 61 BPM | SYSTOLIC BLOOD PRESSURE: 148 MMHG | HEIGHT: 67 IN | DIASTOLIC BLOOD PRESSURE: 90 MMHG | OXYGEN SATURATION: 96 %

## 2024-08-02 DIAGNOSIS — M65.9 SYNOVITIS: Primary | ICD-10-CM

## 2024-08-02 DIAGNOSIS — M10.9 GOUT, UNSPECIFIED CAUSE, UNSPECIFIED CHRONICITY, UNSPECIFIED SITE: ICD-10-CM

## 2024-08-02 DIAGNOSIS — Z86.19 HISTORY OF CLOSTRIDIUM DIFFICILE INFECTION: ICD-10-CM

## 2024-08-02 PROCEDURE — 99999 PR PBB SHADOW E&M-EST. PATIENT-LVL IV: CPT | Mod: PBBFAC,,, | Performed by: NURSE PRACTITIONER

## 2024-08-02 PROCEDURE — 99214 OFFICE O/P EST MOD 30 MIN: CPT | Mod: PBBFAC,PN | Performed by: NURSE PRACTITIONER

## 2024-08-02 RX ORDER — BUSPIRONE HYDROCHLORIDE 5 MG/1
5 TABLET ORAL 2 TIMES DAILY
COMMUNITY
Start: 2024-07-29

## 2024-08-02 RX ORDER — COLCHICINE 0.6 MG/1
TABLET ORAL
Qty: 6 TABLET | Refills: 0 | Status: SHIPPED | OUTPATIENT
Start: 2024-08-02

## 2024-08-02 NOTE — PROGRESS NOTES
Subjective:       Patient ID: Alessia Nelson is a 68 y.o. female.    Chief Complaint: Follow-up (2 week )    Alessia Chapman presents to the clinic today for 2 week follow up   Established patient within the clinic, new patient to myself    Under the care of the following:  PCP: Dr. Calvert   Cardiology: Olive Fowler  Pulmonology: Dr. Olivier   Endocrinology: Jovanny Rodriguez  Otolaryngologist: Dr. Mitul Belle  Optometry: Dr. Rajinder Sheldon   Pain Management: Dr. Carroll     Patient Active Problem List  Diagnosis  · CHF, chronic  · HTN (hypertension), onset in her 20s  · COPD (chronic obstructive pulmonary disease)  · Nicotine dependence  · Diarrhea, chronic  · Allergic rhinitis  · Mild episode of recurrent major depressive disorder  · Fibromyalgia  · GERD (gastroesophageal reflux disease)  · Hypothyroidism  · Hyperlipidemia  · History of malignant neoplasm of skin  · MEN (multiple endocrine neoplasia)  · Peripheral neuropathy  · Poikiloderma  · Pseudophakia  · Rotator cuff injury  · Tendinosis  · Metatarsalgia  · Dry eyes  · Class 1 obesity due to excess calories with serious comorbidity and body mass index (BMI) of 30.0 to 30.9 in adult, today 29.1  · Chronic bilateral low back pain without sciatica  · Contusion of tail of pancreas  · Bile duct abnormality  · Lumbosacral spondylosis  · S/P drug eluting coronary stent placement, 8/2017  · Family history of premature CAD  · Syncope and collapse, onset 2015, about 10 times, one episode noted hypotension at doctor office 9/2017  · Multiple falls, started 9/2018, 5 times usually on standing  · Excessive caffeine intake  · Orthostatic hypotension  · Abdominal obesity  · Aspirin intolerance  · LVH (left ventricular hypertrophy) due to hypertensive disease, with heart failure  · History of cholecystectomy  · Diverticulosis of large intestine without hemorrhage  · Abnormal computerized tomography of biliary tract  · History of pancreatitis  · Primary osteoarthritis of right  knee  · Degenerative tear of medial meniscus of right knee  · Baker's cyst of knee, right  · Chronic pain of right knee  · Unilateral primary osteoarthritis, right knee  · CAD (coronary artery disease)  · History of TIA (transient ischemic attack)  · Status post right knee replacement  · Anemia  · Hypomagnesemia  · Bradycardia  · Chronic pancreatitis  · Colon cancer screening  · B12 deficiency  · History of Clostridium difficile infection  · Abnormal finding on GI tract imaging  · Pancreatic insufficiency  · Degenerative disc disease, lumbar  · Current smoker  · Failed back syndrome of lumbar spine  · Chronic cervical pain  · Activity intolerance  · Sacroiliitis  · Hypothyroidism due to acquired atrophy of thyroid  · Pain of lumbar facet joint  · Non-refractory chronic migraine without aura  · Lumbar radiculopathy  · Isolated cervical dystonia  · Gout  · Generalized anxiety disorder  · Stage 3b chronic kidney disease  · Pharyngoesophageal dysphagia  · Hoarseness, persistent  · Chronic bronchitis  · Bilateral carpal tunnel syndrome  · Major depressive disorder, single episode, moderate  · Aortic atherosclerosis  · Impaired functional mobility and activity tolerance  · Myofascial pain syndrome of lumbar spine  · Myofascial pain syndrome, cervical  · Solitary pulmonary nodule      Presented to the clinic on 7/12/2024 with a 1 month history of left 3rd pip joint/distal finger is swollen, red, painful.  Has some range of motion but not as full as the other fingers.  No trauma.  No injury.  No splinter in it.  She does have a lemon tree with thorns, but states that she would know if she injured it with the tree   No drainage.  No recent nail issues   She has a history of gout but is on allopurinol   Has history of C Diff         Review of Systems    Patient Active Problem List   Diagnosis    CHF, chronic    HTN (hypertension), onset in her 20s    COPD (chronic obstructive pulmonary disease)    Nicotine dependence     Diarrhea, chronic    Allergic rhinitis    Mild episode of recurrent major depressive disorder    Fibromyalgia    GERD (gastroesophageal reflux disease)    Hypothyroidism    Hyperlipidemia    History of malignant neoplasm of skin    MEN (multiple endocrine neoplasia)    Peripheral neuropathy    Poikiloderma    Pseudophakia    Rotator cuff injury    Tendinosis    Metatarsalgia    Dry eyes    Class 1 obesity due to excess calories with serious comorbidity and body mass index (BMI) of 30.0 to 30.9 in adult, today 29.1    Chronic bilateral low back pain without sciatica    Contusion of tail of pancreas    Bile duct abnormality    Lumbosacral spondylosis    S/P drug eluting coronary stent placement, 8/2017    Family history of premature CAD    Syncope and collapse, onset 2015, about 10 times, one episode noted hypotension at doctor office 9/2017    Multiple falls, started 9/2018, 5 times usually on standing    Excessive caffeine intake    Orthostatic hypotension    Abdominal obesity    Aspirin intolerance    LVH (left ventricular hypertrophy) due to hypertensive disease, with heart failure    History of cholecystectomy    Diverticulosis of large intestine without hemorrhage    Abnormal computerized tomography of biliary tract    History of pancreatitis    Primary osteoarthritis of right knee    Degenerative tear of medial meniscus of right knee    Baker's cyst of knee, right    Chronic pain of right knee    Unilateral primary osteoarthritis, right knee    CAD (coronary artery disease)    History of TIA (transient ischemic attack)    Status post right knee replacement    Anemia    Hypomagnesemia    Bradycardia    Chronic pancreatitis    Colon cancer screening    B12 deficiency    History of Clostridium difficile infection    Abnormal finding on GI tract imaging    Pancreatic insufficiency    Degenerative disc disease, lumbar    Current smoker    Failed back syndrome of lumbar spine    Chronic cervical pain    Activity  intolerance    Sacroiliitis    Hypothyroidism due to acquired atrophy of thyroid    Pain of lumbar facet joint    Non-refractory chronic migraine without aura    Lumbar radiculopathy    Isolated cervical dystonia    Gout    Generalized anxiety disorder    Stage 3b chronic kidney disease    Pharyngoesophageal dysphagia    Hoarseness, persistent    Chronic bronchitis    Bilateral carpal tunnel syndrome    Major depressive disorder, single episode, moderate    Aortic atherosclerosis    Impaired functional mobility and activity tolerance    Myofascial pain syndrome of lumbar spine    Myofascial pain syndrome, cervical    Solitary pulmonary nodule       Objective:      Physical Exam  Constitutional:       General: She is not in acute distress.     Appearance: Normal appearance. She is not ill-appearing.   Eyes:      Conjunctiva/sclera: Conjunctivae normal.   Cardiovascular:      Rate and Rhythm: Normal rate and regular rhythm.      Heart sounds: Normal heart sounds. No murmur heard.  Pulmonary:      Effort: Pulmonary effort is normal. No respiratory distress.      Breath sounds: Normal breath sounds.   Musculoskeletal:        Hands:       Right lower leg: No edema.      Left lower leg: No edema.      Comments: No warmth. No tenderness   Skin:     General: Skin is warm and dry.   Neurological:      Mental Status: She is alert. Mental status is at baseline.      Gait: Gait normal.   Psychiatric:         Mood and Affect: Mood normal.         Behavior: Behavior normal.         Thought Content: Thought content normal.         Judgment: Judgment normal.               Lab Results   Component Value Date    WBC 7.73 07/17/2024    HGB 10.8 (L) 07/17/2024    HCT 35.8 (L) 07/17/2024     07/17/2024    CHOL 131 05/20/2024    TRIG 276 (H) 05/20/2024    HDL 48 05/20/2024    ALT 17 01/02/2024    AST 20 01/02/2024     01/02/2024    K 4.3 01/02/2024     01/02/2024    CREATININE 1.0 01/02/2024    BUN 20 01/02/2024    CO2  "24 01/02/2024    TSH 2.932 12/19/2021     The ASCVD Risk score (Fletcher DK, et al., 2019) failed to calculate for the following reasons:    The patient has a prior MI or stroke diagnosis  Visit Vitals  BP (!) 148/90 (BP Location: Left arm, Patient Position: Sitting, BP Method: Large (Manual))   Pulse 61   Resp 18   Ht 5' 7" (1.702 m)   Wt 86 kg (189 lb 9.6 oz)   SpO2 96%   BMI 29.70 kg/m²      Assessment:       1. Synovitis    2. History of Clostridium difficile infection    3. Gout, unspecified cause, unspecified chronicity, unspecified site        Plan:       1. Synovitis  -     colchicine (COLCRYS) 0.6 mg tablet; Take 2 tablets (1.2 mg) by mouth now, in 1 hour take 1 tablet (0.6 mg). Repeat dose in 3 days  Dispense: 6 tablet; Refill: 0  Colcrys as prescribed, maintain hydration  2. History of Clostridium difficile infection  Avoid abx unless absolutely necessary   3. Gout, unspecified cause, unspecified chronicity, unspecified site  -     colchicine (COLCRYS) 0.6 mg tablet; Take 2 tablets (1.2 mg) by mouth now, in 1 hour take 1 tablet (0.6 mg). Repeat dose in 3 days  Dispense: 6 tablet; Refill: 0       No follow-ups on file.      Future Appointments       Date Provider Specialty Appt Notes    8/12/2024 Luana Calvert MD Family Medicine 3m f/u stomach mesh    12/2/2024 Jose Olivier MD Pulmonology 6m f/u             "

## 2024-08-12 ENCOUNTER — TELEPHONE (OUTPATIENT)
Dept: FAMILY MEDICINE | Facility: CLINIC | Age: 69
End: 2024-08-12
Payer: MEDICARE

## 2024-08-14 DIAGNOSIS — M10.9 GOUT, UNSPECIFIED CAUSE, UNSPECIFIED CHRONICITY, UNSPECIFIED SITE: ICD-10-CM

## 2024-08-15 RX ORDER — ALLOPURINOL 300 MG/1
300 TABLET ORAL
Qty: 90 TABLET | Refills: 1 | Status: SHIPPED | OUTPATIENT
Start: 2024-08-15

## 2024-08-15 NOTE — TELEPHONE ENCOUNTER
No care due was identified.  [facility] Embedded Care Due Messages. Reference number: [id number].   8/14/2024 11:27:39 PM CDT

## 2024-08-15 NOTE — TELEPHONE ENCOUNTER
Refill Routing Note   Medication(s) are not appropriate for processing by [facility] for the following reason(s):        Responsible provider unclear    [facility] action(s):  Defer             Appointments  past 12m or future 3m with PCP    Date Provider   Last Visit   7/12/2024 Luana Calvert MD   Next Visit   Visit date not found Luana Calvert MD   ED visits in past 90 days: 0        Note composed:6:00 AM 08/15/2024

## 2024-08-28 NOTE — TELEPHONE ENCOUNTER
----- Message from Yady Hurley sent at 8/28/2024  3:46 PM CDT -----  Contact: self  Type:  RX Refill Request    Who Called:  Patient  Refill or New Rx:  Refill  RX Name and Strength:  clopidogreL (PLAVIX) 75 mg tablet  How is the patient currently taking it? (ex. 1XDay):  as directed  Is this a 30 day or 90 day RX:  as directed  Preferred Pharmacy with phone number:    [id number] [place] [id number] [facility] [address]  [address] [place] [address]  Phone: [phone] Fax: [phone]      Local or Mail Order:  Local  Ordering Provider:  Janeth Silva Call Back Number:  [phone]    Additional Information:  Need a refill.Please advise

## 2024-08-29 RX ORDER — CLOPIDOGREL BISULFATE 75 MG/1
75 TABLET ORAL DAILY
Qty: 90 TABLET | Refills: 3 | Status: SHIPPED | OUTPATIENT
Start: 2024-08-29

## 2024-09-04 ENCOUNTER — OFFICE VISIT (OUTPATIENT)
Dept: FAMILY MEDICINE | Facility: CLINIC | Age: 69
End: 2024-09-04
Payer: MEDICARE

## 2024-09-04 VITALS
DIASTOLIC BLOOD PRESSURE: 84 MMHG | OXYGEN SATURATION: 97 % | SYSTOLIC BLOOD PRESSURE: 130 MMHG | HEART RATE: 89 BPM | HEIGHT: 67 IN | BODY MASS INDEX: 29.88 KG/M2 | RESPIRATION RATE: 16 BRPM | WEIGHT: 190.38 LBS

## 2024-09-04 DIAGNOSIS — I10 PRIMARY HYPERTENSION: Primary | Chronic | ICD-10-CM

## 2024-09-04 DIAGNOSIS — M96.1 POSTLAMINECTOMY SYNDROME OF LUMBAR REGION: ICD-10-CM

## 2024-09-04 DIAGNOSIS — N18.32 STAGE 3B CHRONIC KIDNEY DISEASE: ICD-10-CM

## 2024-09-04 DIAGNOSIS — N18.32 ANEMIA DUE TO STAGE 3B CHRONIC KIDNEY DISEASE: ICD-10-CM

## 2024-09-04 DIAGNOSIS — J01.90 ACUTE BACTERIAL SINUSITIS: ICD-10-CM

## 2024-09-04 DIAGNOSIS — J30.89 NON-SEASONAL ALLERGIC RHINITIS DUE TO OTHER ALLERGIC TRIGGER: ICD-10-CM

## 2024-09-04 DIAGNOSIS — M51.36 DDD (DEGENERATIVE DISC DISEASE), LUMBAR: ICD-10-CM

## 2024-09-04 DIAGNOSIS — G89.4 CHRONIC PAIN DISORDER: ICD-10-CM

## 2024-09-04 DIAGNOSIS — D63.1 ANEMIA DUE TO STAGE 3B CHRONIC KIDNEY DISEASE: ICD-10-CM

## 2024-09-04 DIAGNOSIS — M54.16 LUMBAR RADICULOPATHY: ICD-10-CM

## 2024-09-04 DIAGNOSIS — B96.89 ACUTE BACTERIAL SINUSITIS: ICD-10-CM

## 2024-09-04 DIAGNOSIS — M43.07 LUMBOSACRAL SPONDYLOLYSIS: ICD-10-CM

## 2024-09-04 DIAGNOSIS — F41.1 GENERALIZED ANXIETY DISORDER: ICD-10-CM

## 2024-09-04 PROCEDURE — 99999 PR PBB SHADOW E&M-EST. PATIENT-LVL IV: CPT | Mod: PBBFAC,,, | Performed by: STUDENT IN AN ORGANIZED HEALTH CARE EDUCATION/TRAINING PROGRAM

## 2024-09-04 PROCEDURE — 99214 OFFICE O/P EST MOD 30 MIN: CPT | Mod: PBBFAC,PN | Performed by: STUDENT IN AN ORGANIZED HEALTH CARE EDUCATION/TRAINING PROGRAM

## 2024-09-04 PROCEDURE — 99214 OFFICE O/P EST MOD 30 MIN: CPT | Mod: S$PBB,,, | Performed by: STUDENT IN AN ORGANIZED HEALTH CARE EDUCATION/TRAINING PROGRAM

## 2024-09-04 RX ORDER — FLUTICASONE PROPIONATE 50 MCG
2 SPRAY, SUSPENSION (ML) NASAL 2 TIMES DAILY
Qty: 48 G | Refills: 3 | Status: SHIPPED | OUTPATIENT
Start: 2024-09-04

## 2024-09-04 RX ORDER — GABAPENTIN 400 MG/1
400 CAPSULE ORAL 2 TIMES DAILY
Qty: 180 CAPSULE | Refills: 3 | Status: SHIPPED | OUTPATIENT
Start: 2024-09-04

## 2024-09-04 RX ORDER — ZOSTER VACCINE RECOMBINANT, ADJUVANTED 50 MCG/0.5
0.5 KIT INTRAMUSCULAR ONCE
COMMUNITY
Start: 2024-06-25

## 2024-09-04 RX ORDER — BUSPIRONE HYDROCHLORIDE 5 MG/1
5 TABLET ORAL 2 TIMES DAILY
Qty: 180 TABLET | Refills: 3 | Status: SHIPPED | OUTPATIENT
Start: 2024-09-04

## 2024-09-04 RX ORDER — AMOXICILLIN AND CLAVULANATE POTASSIUM 875; 125 MG/1; MG/1
1 TABLET, FILM COATED ORAL EVERY 12 HOURS
Qty: 20 TABLET | Refills: 0 | Status: SHIPPED | OUTPATIENT
Start: 2024-09-04 | End: 2024-09-14

## 2024-09-04 RX ORDER — AZELASTINE 1 MG/ML
2 SPRAY, METERED NASAL 2 TIMES DAILY
Qty: 90 ML | Refills: 3 | Status: SHIPPED | OUTPATIENT
Start: 2024-09-04

## 2024-09-04 NOTE — PROGRESS NOTES
Subjective:       Patient ID: Alessia Nelson is a 68 y.o. female.    Chief Complaint: Sinusitis    She is overall doing well  She has been weaning her pain medications and doing well with pain management  She is compliant with medications  No issues with her chronic medical conditions  She is looking into the medical marijuana program    Her biggest issue recently is sinus congestion and pain   Pain in her ears, some sneezing and dark nasal discharge  This has been ongoing for 4 weeks without improvement with otc medications          .  Patient Active Problem List   Diagnosis    CHF, chronic    HTN (hypertension), onset in her 20s    COPD (chronic obstructive pulmonary disease)    Nicotine dependence    Diarrhea, chronic    Allergic rhinitis    Mild episode of recurrent major depressive disorder    Fibromyalgia    GERD (gastroesophageal reflux disease)    Hypothyroidism    Hyperlipidemia    History of malignant neoplasm of skin    MEN (multiple endocrine neoplasia)    Peripheral neuropathy    Poikiloderma    Pseudophakia    Rotator cuff injury    Tendinosis    Metatarsalgia    Dry eyes    Class 1 obesity due to excess calories with serious comorbidity and body mass index (BMI) of 30.0 to 30.9 in adult, today 29.1    Chronic bilateral low back pain without sciatica    Contusion of tail of pancreas    Bile duct abnormality    Lumbosacral spondylosis    S/P drug eluting coronary stent placement, 8/2017    Family history of premature CAD    Syncope and collapse, onset 2015, about 10 times, one episode noted hypotension at doctor office 9/2017    Multiple falls, started 9/2018, 5 times usually on standing    Excessive caffeine intake    Orthostatic hypotension    Abdominal obesity    Aspirin intolerance    LVH (left ventricular hypertrophy) due to hypertensive disease, with heart failure    History of cholecystectomy    Diverticulosis of large intestine without hemorrhage    Abnormal computerized tomography of biliary  tract    History of pancreatitis    Primary osteoarthritis of right knee    Degenerative tear of medial meniscus of right knee    Baker's cyst of knee, right    Chronic pain of right knee    Unilateral primary osteoarthritis, right knee    CAD (coronary artery disease)    History of TIA (transient ischemic attack)    Status post right knee replacement    Anemia    Hypomagnesemia    Bradycardia    Chronic pancreatitis    Colon cancer screening    B12 deficiency    History of Clostridium difficile infection    Abnormal finding on GI tract imaging    Pancreatic insufficiency    Degenerative disc disease, lumbar    Current smoker    Failed back syndrome of lumbar spine    Chronic cervical pain    Activity intolerance    Sacroiliitis    Hypothyroidism due to acquired atrophy of thyroid    Pain of lumbar facet joint    Non-refractory chronic migraine without aura    Lumbar radiculopathy    Isolated cervical dystonia    Gout    Generalized anxiety disorder    Stage 3b chronic kidney disease    Pharyngoesophageal dysphagia    Hoarseness, persistent    Chronic bronchitis    Bilateral carpal tunnel syndrome    Major depressive disorder, single episode, moderate    Aortic atherosclerosis    Impaired functional mobility and activity tolerance    Myofascial pain syndrome of lumbar spine    Myofascial pain syndrome, cervical    Solitary pulmonary nodule     Alessia has a current medication list which includes the following prescription(s): acetaminophen, allopurinol, arexvy (pf), calcium citrate-vitamin d3 315-200 mg, clopidogrel, colestipol, cyanocobalamin, escitalopram oxalate, furosemide, hydrocodone-acetaminophen, levothyroxine, magnesium oxide, methocarbamol, metoprolol succinate, mupirocin, ondansetron, pantoprazole, potassium chloride sa, rosuvastatin, shingrix (pf), trazodone, zenpep, amoxicillin-clavulanate 875-125mg, azelastine, buspirone, colchicine, fluticasone propionate, and gabapentin, and the following  Facility-Administered Medications: electrolyte-s (ph 7.4), fentanyl, hydromorphone, lactated ringers, lactated ringers, lactated ringers, lactated ringers, lactated ringers, lidocaine (pf) 10 mg/ml (1%), lidocaine (pf) 10 mg/ml (1%), lorazepam, oxycodone, prochlorperazine, and sodium chloride 0.9%.    Review of Systems   Constitutional:  Negative for activity change and appetite change.   HENT:  Positive for ear pain and sinus pressure/congestion.    Respiratory:  Negative for shortness of breath.    Cardiovascular:  Negative for chest pain.   Gastrointestinal:  Negative for abdominal pain.   Genitourinary:  Negative for dysuria.   Integumentary:  Negative for rash.   Psychiatric/Behavioral:  Negative for dysphoric mood and sleep disturbance. The patient is not nervous/anxious.          Health Maintenance Due   Topic Date Due    Hemoglobin A1c (Diabetic Prevention Screening)  Never done    Shingles Vaccine (1 of 2) Never done    RSV Vaccine (Age 60+ and Pregnant patients) (1 - 1-dose 60+ series) Never done    Mammogram  03/10/2022    Pneumococcal Vaccines (Age 65+) (2 of 2 - PCV) 06/01/2023    Influenza Vaccine (1) 09/01/2024    COVID-19 Vaccine (4 - 2023-24 season) 09/01/2024      Health Maintenance reviewed and discussed- encouraged vaccines, mammogram ordered.   Objective:      Physical Exam  Constitutional:       General: She is not in acute distress.     Appearance: Normal appearance. She is not ill-appearing.   HENT:      Right Ear: Tympanic membrane, ear canal and external ear normal. There is no impacted cerumen.      Left Ear: Tympanic membrane, ear canal and external ear normal. There is no impacted cerumen.   Eyes:      Conjunctiva/sclera: Conjunctivae normal.   Cardiovascular:      Rate and Rhythm: Normal rate and regular rhythm.      Heart sounds: Normal heart sounds. No murmur heard.  Pulmonary:      Effort: Pulmonary effort is normal. No respiratory distress.      Breath sounds: Normal breath sounds.  No wheezing or rales.   Musculoskeletal:      Right lower leg: No edema.      Left lower leg: No edema.   Skin:     General: Skin is warm and dry.   Neurological:      Mental Status: She is alert. Mental status is at baseline.      Gait: Gait normal.   Psychiatric:         Mood and Affect: Mood normal.         Behavior: Behavior normal.         Thought Content: Thought content normal.         Judgment: Judgment normal.         Assessment:       1. Primary hypertension    2. Postlaminectomy syndrome of lumbar region    3. DDD (degenerative disc disease), lumbar    4. Chronic pain disorder    5. Lumbosacral spondylolysis    6. Non-seasonal allergic rhinitis due to other allergic trigger    7. Generalized anxiety disorder    8. Lumbar radiculopathy    9. Stage 3b chronic kidney disease    10. Anemia due to stage 3b chronic kidney disease    11. Acute bacterial sinusitis        Plan:       1. Primary hypertension  Assessment & Plan:  Stable. Continue current medications and regular followup.  Hypertension Medications               furosemide (LASIX) 20 MG tablet TAKE 1 TABLET(20 MG) BY MOUTH EVERY DAY    metoprolol succinate (TOPROL-XL) 50 MG 24 hr tablet Take 1 tablet (50 mg total) by mouth once daily. Pt takes 1/2 tab daily                2. Postlaminectomy syndrome of lumbar region  -     gabapentin (NEURONTIN) 400 MG capsule; Take 1 capsule (400 mg total) by mouth 2 (two) times daily.  Dispense: 180 capsule; Refill: 3    3. DDD (degenerative disc disease), lumbar  -     gabapentin (NEURONTIN) 400 MG capsule; Take 1 capsule (400 mg total) by mouth 2 (two) times daily.  Dispense: 180 capsule; Refill: 3    4. Chronic pain disorder  -     gabapentin (NEURONTIN) 400 MG capsule; Take 1 capsule (400 mg total) by mouth 2 (two) times daily.  Dispense: 180 capsule; Refill: 3    5. Lumbosacral spondylolysis  -     gabapentin (NEURONTIN) 400 MG capsule; Take 1 capsule (400 mg total) by mouth 2 (two) times daily.  Dispense: 180  capsule; Refill: 3    6. Non-seasonal allergic rhinitis due to other allergic trigger  Assessment & Plan:  Stable. Continue current medications and regular followup.      Orders:  -     azelastine (ASTELIN) 137 mcg (0.1 %) nasal spray; 2 sprays (274 mcg total) by Nasal route 2 (two) times daily.  Dispense: 90 mL; Refill: 3  -     fluticasone propionate (FLONASE) 50 mcg/actuation nasal spray; 2 sprays (100 mcg total) by Each Nostril route 2 (two) times daily.  Dispense: 48 g; Refill: 3    7. Generalized anxiety disorder  Assessment & Plan:  Stable. Continue current medications and regular followup.      Orders:  -     busPIRone (BUSPAR) 5 MG Tab; Take 1 tablet (5 mg total) by mouth 2 (two) times daily.  Dispense: 180 tablet; Refill: 3    8. Lumbar radiculopathy  Assessment & Plan:  Seeing pain management and using her medications as needed.      9. Stage 3b chronic kidney disease  Assessment & Plan:  BMP  Lab Results   Component Value Date     01/02/2024    K 4.3 01/02/2024     01/02/2024    CO2 24 01/02/2024    BUN 20 01/02/2024    CREATININE 1.0 01/02/2024    CALCIUM 11.2 (H) 01/02/2024    ANIONGAP 11 01/02/2024    EGFRNORACEVR >60.0 01/02/2024     Stable. Continue current medications and regular followup.        10. Anemia due to stage 3b chronic kidney disease  -     CBC Without Differential; Future; Expected date: 09/04/2024    11. Acute bacterial sinusitis  -     amoxicillin-clavulanate 875-125mg (AUGMENTIN) 875-125 mg per tablet; Take 1 tablet by mouth every 12 (twelve) hours. for 10 days  Dispense: 20 tablet; Refill: 0

## 2024-09-04 NOTE — ASSESSMENT & PLAN NOTE
Stable. Continue current medications and regular followup.  Hypertension Medications               furosemide (LASIX) 20 MG tablet TAKE 1 TABLET(20 MG) BY MOUTH EVERY DAY    metoprolol succinate (TOPROL-XL) 50 MG 24 hr tablet Take 1 tablet (50 mg total) by mouth once daily. Pt takes 1/2 tab daily

## 2024-09-24 ENCOUNTER — HOSPITAL ENCOUNTER (INPATIENT)
Facility: HOSPITAL | Age: 69
LOS: 6 days | Discharge: HOME | DRG: 392 | End: 2024-10-01
Attending: STUDENT IN AN ORGANIZED HEALTH CARE EDUCATION/TRAINING PROGRAM | Admitting: HOSPITALIST
Payer: MEDICARE

## 2024-09-24 DIAGNOSIS — R10.9 ABDOMINAL PAIN, UNSPECIFIED ABDOMINAL LOCATION: ICD-10-CM

## 2024-09-24 DIAGNOSIS — R07.9 CHEST PAIN: ICD-10-CM

## 2024-09-24 DIAGNOSIS — K86.1 CHRONIC PANCREATITIS, UNSPECIFIED PANCREATITIS TYPE: Primary | ICD-10-CM

## 2024-09-24 DIAGNOSIS — R19.7 DIARRHEA, UNSPECIFIED TYPE: ICD-10-CM

## 2024-09-24 LAB
ALBUMIN SERPL BCP-MCNC: 4.1 G/DL (ref 3.5–5.2)
ALP SERPL-CCNC: 90 U/L (ref 55–135)
ALT SERPL W/O P-5'-P-CCNC: 12 U/L (ref 10–44)
ANION GAP SERPL CALC-SCNC: 11 MMOL/L (ref 8–16)
AST SERPL-CCNC: 17 U/L (ref 10–40)
BASOPHILS # BLD AUTO: 0.07 K/UL (ref 0–0.2)
BASOPHILS NFR BLD: 0.8 % (ref 0–1.9)
BILIRUB SERPL-MCNC: 0.3 MG/DL (ref 0.1–1)
BNP SERPL-MCNC: 112 PG/ML (ref 0–99)
BUN SERPL-MCNC: 16 MG/DL (ref 8–23)
CALCIUM SERPL-MCNC: 10.1 MG/DL (ref 8.7–10.5)
CHLORIDE SERPL-SCNC: 108 MMOL/L (ref 95–110)
CO2 SERPL-SCNC: 24 MMOL/L (ref 23–29)
CREAT SERPL-MCNC: 0.9 MG/DL (ref 0.5–1.4)
DIFFERENTIAL METHOD BLD: ABNORMAL
EOSINOPHIL # BLD AUTO: 0.2 K/UL (ref 0–0.5)
EOSINOPHIL NFR BLD: 2.5 % (ref 0–8)
ERYTHROCYTE [DISTWIDTH] IN BLOOD BY AUTOMATED COUNT: 14.6 % (ref 11.5–14.5)
EST. GFR  (NO RACE VARIABLE): >60 ML/MIN/1.73 M^2
GLUCOSE SERPL-MCNC: 156 MG/DL (ref 70–110)
HCT VFR BLD AUTO: 35.5 % (ref 37–48.5)
HGB BLD-MCNC: 10.9 G/DL (ref 12–16)
IMM GRANULOCYTES # BLD AUTO: 0.05 K/UL (ref 0–0.04)
IMM GRANULOCYTES NFR BLD AUTO: 0.5 % (ref 0–0.5)
LIPASE SERPL-CCNC: 10 U/L (ref 4–60)
LYMPHOCYTES # BLD AUTO: 2.2 K/UL (ref 1–4.8)
LYMPHOCYTES NFR BLD: 24.4 % (ref 18–48)
MCH RBC QN AUTO: 29.4 PG (ref 27–31)
MCHC RBC AUTO-ENTMCNC: 30.7 G/DL (ref 32–36)
MCV RBC AUTO: 96 FL (ref 82–98)
MONOCYTES # BLD AUTO: 0.7 K/UL (ref 0.3–1)
MONOCYTES NFR BLD: 7.2 % (ref 4–15)
NEUTROPHILS # BLD AUTO: 5.9 K/UL (ref 1.8–7.7)
NEUTROPHILS NFR BLD: 64.6 % (ref 38–73)
NRBC BLD-RTO: 0 /100 WBC
PLATELET # BLD AUTO: 261 K/UL (ref 150–450)
PMV BLD AUTO: 9 FL (ref 9.2–12.9)
POTASSIUM SERPL-SCNC: 4 MMOL/L (ref 3.5–5.1)
PROT SERPL-MCNC: 6.8 G/DL (ref 6–8.4)
RBC # BLD AUTO: 3.71 M/UL (ref 4–5.4)
SODIUM SERPL-SCNC: 143 MMOL/L (ref 136–145)
TROPONIN I SERPL HS-MCNC: 6.8 PG/ML (ref 0–14.9)
WBC # BLD AUTO: 9.13 K/UL (ref 3.9–12.7)

## 2024-09-24 PROCEDURE — 85025 COMPLETE CBC W/AUTO DIFF WBC: CPT | Performed by: NURSE PRACTITIONER

## 2024-09-24 PROCEDURE — 25000003 PHARM REV CODE 250: Performed by: STUDENT IN AN ORGANIZED HEALTH CARE EDUCATION/TRAINING PROGRAM

## 2024-09-24 PROCEDURE — G0378 HOSPITAL OBSERVATION PER HR: HCPCS

## 2024-09-24 PROCEDURE — 83880 ASSAY OF NATRIURETIC PEPTIDE: CPT | Performed by: NURSE PRACTITIONER

## 2024-09-24 PROCEDURE — 80053 COMPREHEN METABOLIC PANEL: CPT | Performed by: NURSE PRACTITIONER

## 2024-09-24 PROCEDURE — 93010 ELECTROCARDIOGRAM REPORT: CPT | Mod: ,,, | Performed by: GENERAL PRACTICE

## 2024-09-24 PROCEDURE — 99285 EMERGENCY DEPT VISIT HI MDM: CPT | Mod: 25

## 2024-09-24 PROCEDURE — 63600175 PHARM REV CODE 636 W HCPCS: Performed by: STUDENT IN AN ORGANIZED HEALTH CARE EDUCATION/TRAINING PROGRAM

## 2024-09-24 PROCEDURE — 83690 ASSAY OF LIPASE: CPT | Performed by: NURSE PRACTITIONER

## 2024-09-24 PROCEDURE — 96375 TX/PRO/DX INJ NEW DRUG ADDON: CPT

## 2024-09-24 PROCEDURE — 25500020 PHARM REV CODE 255: Performed by: STUDENT IN AN ORGANIZED HEALTH CARE EDUCATION/TRAINING PROGRAM

## 2024-09-24 PROCEDURE — 93005 ELECTROCARDIOGRAM TRACING: CPT | Performed by: GENERAL PRACTICE

## 2024-09-24 PROCEDURE — 84484 ASSAY OF TROPONIN QUANT: CPT | Performed by: NURSE PRACTITIONER

## 2024-09-24 PROCEDURE — 96361 HYDRATE IV INFUSION ADD-ON: CPT

## 2024-09-24 RX ORDER — LANOLIN ALCOHOL/MO/W.PET/CERES
800 CREAM (GRAM) TOPICAL
Status: DISCONTINUED | OUTPATIENT
Start: 2024-09-25 | End: 2024-10-01 | Stop reason: HOSPADM

## 2024-09-24 RX ORDER — NALOXONE HCL 0.4 MG/ML
0.02 VIAL (ML) INJECTION
Status: DISCONTINUED | OUTPATIENT
Start: 2024-09-25 | End: 2024-10-01 | Stop reason: HOSPADM

## 2024-09-24 RX ORDER — IBUPROFEN 200 MG
24 TABLET ORAL
Status: DISCONTINUED | OUTPATIENT
Start: 2024-09-25 | End: 2024-10-01 | Stop reason: HOSPADM

## 2024-09-24 RX ORDER — ALLOPURINOL 300 MG/1
300 TABLET ORAL DAILY
Status: DISCONTINUED | OUTPATIENT
Start: 2024-09-25 | End: 2024-10-01 | Stop reason: HOSPADM

## 2024-09-24 RX ORDER — OXYCODONE AND ACETAMINOPHEN 10; 325 MG/1; MG/1
1 TABLET ORAL EVERY 4 HOURS PRN
Status: DISCONTINUED | OUTPATIENT
Start: 2024-09-24 | End: 2024-09-24

## 2024-09-24 RX ORDER — SODIUM,POTASSIUM PHOSPHATES 280-250MG
2 POWDER IN PACKET (EA) ORAL
Status: DISCONTINUED | OUTPATIENT
Start: 2024-09-25 | End: 2024-10-01 | Stop reason: HOSPADM

## 2024-09-24 RX ORDER — BUSPIRONE HYDROCHLORIDE 5 MG/1
5 TABLET ORAL 2 TIMES DAILY
Status: DISCONTINUED | OUTPATIENT
Start: 2024-09-25 | End: 2024-10-01 | Stop reason: HOSPADM

## 2024-09-24 RX ORDER — MORPHINE SULFATE 2 MG/ML
2 INJECTION, SOLUTION INTRAMUSCULAR; INTRAVENOUS EVERY 4 HOURS PRN
Status: DISCONTINUED | OUTPATIENT
Start: 2024-09-25 | End: 2024-10-01 | Stop reason: HOSPADM

## 2024-09-24 RX ORDER — ACETAMINOPHEN 325 MG/1
650 TABLET ORAL EVERY 4 HOURS PRN
Status: DISCONTINUED | OUTPATIENT
Start: 2024-09-25 | End: 2024-10-01 | Stop reason: HOSPADM

## 2024-09-24 RX ORDER — SODIUM CHLORIDE 0.9 % (FLUSH) 0.9 %
10 SYRINGE (ML) INJECTION
Status: DISCONTINUED | OUTPATIENT
Start: 2024-09-25 | End: 2024-10-01 | Stop reason: HOSPADM

## 2024-09-24 RX ORDER — ESCITALOPRAM OXALATE 10 MG/1
20 TABLET ORAL NIGHTLY
Status: DISCONTINUED | OUTPATIENT
Start: 2024-09-25 | End: 2024-10-01 | Stop reason: HOSPADM

## 2024-09-24 RX ORDER — METOPROLOL SUCCINATE 25 MG/1
25 TABLET, EXTENDED RELEASE ORAL DAILY
Status: DISCONTINUED | OUTPATIENT
Start: 2024-09-25 | End: 2024-10-01 | Stop reason: HOSPADM

## 2024-09-24 RX ORDER — LEVOTHYROXINE SODIUM 25 UG/1
75 TABLET ORAL
Status: DISCONTINUED | OUTPATIENT
Start: 2024-09-25 | End: 2024-10-01 | Stop reason: HOSPADM

## 2024-09-24 RX ORDER — ENOXAPARIN SODIUM 100 MG/ML
40 INJECTION SUBCUTANEOUS EVERY 24 HOURS
Status: DISCONTINUED | OUTPATIENT
Start: 2024-09-24 | End: 2024-10-01 | Stop reason: HOSPADM

## 2024-09-24 RX ORDER — OXYCODONE AND ACETAMINOPHEN 10; 325 MG/1; MG/1
1 TABLET ORAL ONCE
Status: COMPLETED | OUTPATIENT
Start: 2024-09-24 | End: 2024-09-24

## 2024-09-24 RX ORDER — PANTOPRAZOLE SODIUM 40 MG/1
40 TABLET, DELAYED RELEASE ORAL DAILY
Status: DISCONTINUED | OUTPATIENT
Start: 2024-09-25 | End: 2024-10-01 | Stop reason: HOSPADM

## 2024-09-24 RX ORDER — HYDROMORPHONE HYDROCHLORIDE 1 MG/ML
1 INJECTION, SOLUTION INTRAMUSCULAR; INTRAVENOUS; SUBCUTANEOUS
Status: COMPLETED | OUTPATIENT
Start: 2024-09-24 | End: 2024-09-24

## 2024-09-24 RX ORDER — GLUCAGON 1 MG
1 KIT INJECTION
Status: DISCONTINUED | OUTPATIENT
Start: 2024-09-25 | End: 2024-10-01 | Stop reason: HOSPADM

## 2024-09-24 RX ORDER — IBUPROFEN 200 MG
16 TABLET ORAL
Status: DISCONTINUED | OUTPATIENT
Start: 2024-09-25 | End: 2024-10-01 | Stop reason: HOSPADM

## 2024-09-24 RX ORDER — CLOPIDOGREL BISULFATE 75 MG/1
75 TABLET ORAL DAILY
Status: DISCONTINUED | OUTPATIENT
Start: 2024-09-25 | End: 2024-10-01 | Stop reason: HOSPADM

## 2024-09-24 RX ORDER — TRAZODONE HYDROCHLORIDE 50 MG/1
100 TABLET ORAL NIGHTLY
Status: DISCONTINUED | OUTPATIENT
Start: 2024-09-25 | End: 2024-10-01 | Stop reason: HOSPADM

## 2024-09-24 RX ORDER — ONDANSETRON HYDROCHLORIDE 2 MG/ML
4 INJECTION, SOLUTION INTRAVENOUS EVERY 6 HOURS PRN
Status: DISCONTINUED | OUTPATIENT
Start: 2024-09-25 | End: 2024-09-25

## 2024-09-24 RX ORDER — ATORVASTATIN CALCIUM 20 MG/1
20 TABLET, FILM COATED ORAL DAILY
Status: DISCONTINUED | OUTPATIENT
Start: 2024-09-25 | End: 2024-10-01 | Stop reason: HOSPADM

## 2024-09-24 RX ORDER — ONDANSETRON HYDROCHLORIDE 2 MG/ML
4 INJECTION, SOLUTION INTRAVENOUS
Status: COMPLETED | OUTPATIENT
Start: 2024-09-24 | End: 2024-09-24

## 2024-09-24 RX ORDER — CHOLESTYRAMINE 4 G/4.8G
4 POWDER, FOR SUSPENSION ORAL 2 TIMES DAILY
Status: DISCONTINUED | OUTPATIENT
Start: 2024-09-25 | End: 2024-10-01 | Stop reason: HOSPADM

## 2024-09-24 RX ORDER — SODIUM CHLORIDE, SODIUM LACTATE, POTASSIUM CHLORIDE, CALCIUM CHLORIDE 600; 310; 30; 20 MG/100ML; MG/100ML; MG/100ML; MG/100ML
INJECTION, SOLUTION INTRAVENOUS CONTINUOUS
Status: ACTIVE | OUTPATIENT
Start: 2024-09-25 | End: 2024-09-25

## 2024-09-24 RX ORDER — ALUMINUM HYDROXIDE, MAGNESIUM HYDROXIDE, AND SIMETHICONE 1200; 120; 1200 MG/30ML; MG/30ML; MG/30ML
30 SUSPENSION ORAL 4 TIMES DAILY PRN
Status: DISCONTINUED | OUTPATIENT
Start: 2024-09-25 | End: 2024-10-01 | Stop reason: HOSPADM

## 2024-09-24 RX ADMIN — SODIUM CHLORIDE, POTASSIUM CHLORIDE, SODIUM LACTATE AND CALCIUM CHLORIDE 1000 ML: 600; 310; 30; 20 INJECTION, SOLUTION INTRAVENOUS at 10:09

## 2024-09-24 RX ADMIN — ONDANSETRON 4 MG: 2 INJECTION INTRAMUSCULAR; INTRAVENOUS at 10:09

## 2024-09-24 RX ADMIN — HYDROMORPHONE HYDROCHLORIDE 1 MG: 1 INJECTION, SOLUTION INTRAMUSCULAR; INTRAVENOUS; SUBCUTANEOUS at 10:09

## 2024-09-24 RX ADMIN — SODIUM CHLORIDE, POTASSIUM CHLORIDE, SODIUM LACTATE AND CALCIUM CHLORIDE 1000 ML: 600; 310; 30; 20 INJECTION, SOLUTION INTRAVENOUS at 04:09

## 2024-09-24 RX ADMIN — OXYCODONE HYDROCHLORIDE AND ACETAMINOPHEN 1 TABLET: 10; 325 TABLET ORAL at 07:09

## 2024-09-24 RX ADMIN — OXYCODONE HYDROCHLORIDE AND ACETAMINOPHEN 1 TABLET: 10; 325 TABLET ORAL at 06:09

## 2024-09-24 RX ADMIN — IOHEXOL 100 ML: 350 INJECTION, SOLUTION INTRAVENOUS at 11:09

## 2024-09-24 NOTE — ED PROVIDER NOTES
"Encounter Date: 9/24/2024       History     Chief Complaint   Patient presents with    Palpitations     Complains of palpations since Saturday.  States that she feels like her heart is skipping a beat.  Denies chest pain    Abdominal Pain     States has a hx of chronic pancreatitis abdominal pain since Saturday.       HPI    Alessia Nelson is a 68 y.o. female with a past medical history of CAD status post PCI, COPD, chronic pancreatitis, previous bowel resection, hypertension, hyperlipidemia, and recurrent at palpitations that presents emergency department for epigastric/right upper quadrant abdominal pain as well as palpitations that has been ongoing since Saturday.  Patient has had but these complaints numerous times and they are chronic in nature.  Patient was previously seen for palpitations in Dr. Fowler's office in February.  She had a monitor which she wore for 2 weeks which showed short runs of atrial tachycardia but no other arrhythmias.  Intermittently been having these palpitations since that time.  Never having any chest pain.  They are nonexertional.  She is also complaining of epigastric/right upper quadrant abdominal pain that radiates to her back.  She describes it as a vague aching sensation.  It was associated with nausea without any vomiting.  She is also complaining of knee pain for which he has had a previous TKA.  No significant change in her knee pain.  She does state that she feels like having a bowel movement recently has been painful but no significant changes otherwise.  No blood in her stool.  No numbness or weakness, fevers, vomiting, shortness of breath, or changes in urinary habits.    Review of patient's allergies indicates:   Allergen Reactions    Aspirin Other (See Comments)     "Makes stomach feel like it's on fire"    Phenergan [promethazine] Other (See Comments)     SEIZURES    Lortab [hydrocodone-acetaminophen] Itching     Able to take generic Norco     Past Medical History: " Cath scheduled for 8/3/21 at 11:00. Pt has kidney failure so he will come on 8/2/21 at 8:00pm to admitting for prehydration. Patient notified and voiced understanding.   Instructions mailed   Diagnosis Date    Acute pancreatitis     Back pain     CAD (coronary artery disease)     CHF (congestive heart failure)     COPD (chronic obstructive pulmonary disease)     Depression     Diverticulosis     Encounter for blood transfusion     GERD (gastroesophageal reflux disease)     History of blood clots     1986 AFTER BOWEL RESCETION    History of bowel resection     Hyperlipidemia     Hypertension     Peritonitis     Pulmonary nodule     Seizures     HAD A SEIZURE FROM PHENERGAN     Stroke     Thyroid disease     TIA (transient ischemic attack)     Wears dentures     upper     Past Surgical History:   Procedure Laterality Date    ADRENAL GLAND SURGERY      APPENDECTOMY      BACK SURGERY      CAUDAL EPIDURAL STEROID INJECTION N/A 3/1/2021    Procedure: Injection-steroid-epidural-caudal;  Surgeon: Socorro Lorenz MD;  Location: [facility] OR;  Service: Pain Management;  Laterality: N/A;    CHOLECYSTECTOMY      COLONOSCOPY      COLONOSCOPY N/A 6/10/2021    Procedure: COLONOSCOPY;  Surgeon: Sheree Metz MD;  Location: [facility] ENDO;  Service: Endoscopy;  Laterality: N/A;    COLONOSCOPY N/A 3/8/2022    Procedure: COLONOSCOPY;  Surgeon: Maurilio Rodriguez MD;  Location: [facility] ENDO ([address] [place] [address]);  Service: Endoscopy;  Laterality: N/A;  Pt to perform at-home COVID test morning of procedure-DS  2/24-Blood thinner approval rec'[facility] Dr. Walsh (see letters tab 2/24/22)-DS  3/2-Instructions sent via email-DS  3/7-Pt unable to come earlier due to ride-DS    CORONARY STENT PLACEMENT      CYSTOURETHROSCOPY N/A 11/13/2019    Procedure: CYSTOURETHROSCOPY;  Surgeon: Shun Nuñez MD;  Location: [facility] OR;  Service: Urology;  Laterality: N/A;    DIRECT DIAGNOSTIC LARYNGOSCOPY WITH BRONCHOSCOPY AND ESOPHAGOSCOPY N/A 1/3/2024    Procedure: LARYNGOSCOPY, DIRECT, DIAGNOSTIC, WITH BRONCHOSCOPY AND ESOPHAGOSCOPY;  Surgeon: Mir Belle MD;  Location: [facility] OR;  Service: ENT;  Laterality: N/A;    ENDOSCOPIC ULTRASOUND OF LOWER  GASTROINTESTINAL TRACT N/A 3/8/2022    Procedure: ULTRASOUND, LOWER GI TRACT, ENDOSCOPIC;  Surgeon: Maurilio Rodriguez MD;  Location: [facility] ENDO (2ND FLR);  Service: Endoscopy;  Laterality: N/A;  Pt to perform at-home COVID test morning of procedure-DS  [facility]DS  3/2-Instructions sent via email-DS  3/7-Pt unable to come earlier due to ride-DS    ENDOSCOPIC ULTRASOUND OF UPPER GASTROINTESTINAL TRACT N/A 11/25/2019    Procedure: ULTRASOUND, UPPER GI TRACT, ENDOSCOPIC;  Surgeon: Alhaji Bridges MD;  Location: [facility] ENDO (2ND FLR);  Service: Endoscopy;  Laterality: N/A;  5 day hold Plavix, Dr Jovanni Serrano - pg  PM prep    ENDOSCOPIC ULTRASOUND OF UPPER GASTROINTESTINAL TRACT N/A 11/2/2020    Procedure: ULTRASOUND, UPPER GI TRACT, ENDOSCOPIC;  Surgeon: Maurilio Rodriguez MD;  Location: [facility] ENDO (2ND FLR);  Service: Endoscopy;  Laterality: N/A;  5 day hold Plavix, Dr Roman Walsh - pg  Covid-19 test 10/30/20 at [facility]    EPIDURAL STEROID INJECTION INTO CERVICAL SPINE N/A 12/8/2021    Procedure: Injection-steroid-epidural-cervical;  Surgeon: Socorro Lorenz MD;  Location: [facility] OR;  Service: Pain Management;  Laterality: N/A;    EPIDURAL STEROID INJECTION INTO CERVICAL SPINE N/A 11/30/2023    Procedure: Injection-steroid-epidural-cervical;  Surgeon: Maurilio Carroll MD;  Location: [facility] OR;  Service: Anesthesiology;  Laterality: N/A;  c7-T1    ESOPHAGOGASTRODUODENOSCOPY N/A 6/10/2021    Procedure: EGD (ESOPHAGOGASTRODUODENOSCOPY);  Surgeon: Sheree Metz MD;  Location: [facility] ENDO;  Service: Endoscopy;  Laterality: N/A;    ESOPHAGOSCOPY, USING BOUGIE, WITH DILATION N/A 1/3/2024    Procedure: ESOPHAGOSCOPY, USING BOUGIE, WITH DILATION;  Surgeon: Mir Belle MD;  Location: [facility] OR;  Service: ENT;  Laterality: N/A;    FRACTURE SURGERY Left 05/02/2022    ankle    HERNIA REPAIR      HYSTERECTOMY      INCISIONAL HERNIA REPAIR      INJECTION OF ANESTHETIC  AGENT AROUND NERVE Right 5/27/2019    Procedure: RIGHT L5-S3 MEDIAL BRANCH BLOCKS;  Surgeon: Sneha Aragon MD;  Location: [facility] OR;  Service: Pain Management;  Laterality: Right;  **DO NOT STOP PLAVIX**    INJECTION OF JOINT Right 9/22/2021    Procedure: Injection, Joint; Right SI Joint Injection;  Surgeon: Socorro Lorenz MD;  Location: [facility] OR;  Service: Pain Management;  Laterality: Right;  Oral    INSERTION OF DORSAL COLUMN NERVE STIMULATOR FOR TRIAL N/A 6/7/2021    Procedure: INSERTION, NEUROSTIMULATOR, SPINAL CORD, DORSAL COLUMN, FOR TRIAL;  Surgeon: Socorro Lorenz MD;  Location: [facility] OR;  Service: Pain Management;  Laterality: N/A;  nevro rep confirmed start time    instestine      bowel section    KNEE ARTHROPLASTY Right 12/18/2019    Procedure: ARTHROPLASTY, KNEE;  Surgeon: Ike Briceño II, MD;  Location: [facility] OR;  Service: Orthopedics;  Laterality: Right;    KNEE SURGERY  1983    OOPHORECTOMY      PARATHYROID GLAND SURGERY      3 surgeries    RADIOFREQUENCY ABLATION Right 6/10/2019    Procedure: Radiofrequency Ablation - RIGHT L3-5 RADIOFREQUENCY ABLATION WITH HALYARD COOLIEF THERMAL SYSTEM;  Surgeon: Sneha Aragon MD;  Location: [facility] OR;  Service: Pain Management;  Laterality: Right;  **HOLD PLAVIX x 7 DAYS PRIOR**    SPINAL CORD STIMULATOR REMOVAL N/A 2/21/2024    Procedure: REMOVAL, NEUROSTIMULATOR, SPINAL;  Surgeon: Maurilio Carroll MD;  Location: [facility] OR;  Service: Pain Management;  Laterality: N/A;  removal of spinal cord stimulator    UPPER GASTROINTESTINAL ENDOSCOPY       Family History   Problem Relation Name Age of Onset    Stroke Maternal Grandmother      Cancer Maternal Grandfather      Stroke Mother      Heart disease Father      Breast cancer Sister       Social History     Tobacco Use    Smoking status: Former     Current packs/day: 0.00     Average packs/day: 1 pack/day for 46.0 years (46.0 ttl pk-yrs)     Types: Cigarettes     Start date: 1/2/1977     Quit date: 1/2/2023     Years  since quittin.7    Smokeless tobacco: Never   Substance Use Topics    Alcohol use: Not Currently    Drug use: No     Review of Systems   Constitutional:  Negative for fever.   Eyes:  Negative for visual disturbance.   Respiratory:  Negative for cough and shortness of breath.    Cardiovascular:  Positive for palpitations. Negative for chest pain.   Gastrointestinal:  Positive for abdominal pain. Negative for constipation, diarrhea, nausea and vomiting.   Genitourinary:  Negative for dysuria, frequency and hematuria.   Musculoskeletal:  Negative for back pain and neck pain.   All other systems reviewed and are negative.      Physical Exam     Initial Vitals [24 1438]   BP Pulse Resp Temp SpO2   (!) 151/75 80 19 99 °F (37.2 °C) (!) 93 %      MAP       --         Physical Exam    Nursing note and vitals reviewed.  Constitutional: She appears well-developed and well-nourished.   HENT:   Head: Normocephalic and atraumatic.   Eyes: EOM are normal. Pupils are equal, round, and reactive to light.   Neck:   Normal range of motion.  Cardiovascular:  Normal rate, regular rhythm and normal heart sounds.           Pulmonary/Chest: Breath sounds normal. No respiratory distress. She has no wheezes. She has no rhonchi. She has no rales.   Abdominal: Abdomen is soft. She exhibits no distension. There is abdominal tenderness (Epigastric). There is no rebound.   Musculoskeletal:         General: Normal range of motion.      Cervical back: Normal range of motion.     Neurological: She is alert and oriented to person, place, and time. She has normal strength. No cranial nerve deficit or sensory deficit. GCS eye subscore is 4. GCS verbal subscore is 5. GCS motor subscore is 6.   Skin: Capillary refill takes less than 2 seconds. No rash noted.   Psychiatric: She has a normal mood and affect.         ED Course   Procedures  Labs Reviewed   CBC W/ AUTO DIFFERENTIAL - Abnormal       Result Value    WBC 9.13      RBC 3.71 (*)      Hemoglobin 10.9 (*)     Hematocrit 35.5 (*)     MCV 96      MCH 29.4      MCHC 30.7 (*)     RDW 14.6 (*)     Platelets 261      MPV 9.0 (*)     Immature Granulocytes 0.5      Gran # (ANC) 5.9      Immature Grans (Abs) 0.05 (*)     Lymph # 2.2      Mono # 0.7      Eos # 0.2      Baso # 0.07      nRBC 0      Gran % 64.6      Lymph % 24.4      Mono % 7.2      Eosinophil % 2.5      Basophil % 0.8      Differential Method Automated     COMPREHENSIVE METABOLIC PANEL - Abnormal    Sodium 143      Potassium 4.0      Chloride 108      CO2 24      Glucose 156 (*)     BUN 16      Creatinine 0.9      Calcium 10.1      Total Protein 6.8      Albumin 4.1      Total Bilirubin 0.3      Alkaline Phosphatase 90      AST 17      ALT 12      eGFR >60.0      Anion Gap 11     B-TYPE NATRIURETIC PEPTIDE - Abnormal     (*)    TROPONIN I HIGH SENSITIVITY    Troponin I High Sensitivity 6.8     LIPASE    Lipase 10          ECG Results              EKG 12-lead (In process)        Collection Time Result Time QRS Duration OHS QTC Calculation    09/24/24 14:35:19 09/24/24 14:50:32 92 444                     In process by Interface, Lab In [facility] (09/24/24 14:50:39)                   Narrative:    Test Reason : R07.9,    Vent. Rate : 075 BPM     Atrial Rate : 075 BPM     P-R Int : 166 ms          QRS Dur : 092 ms      QT Int : 398 ms       P-R-T Axes : 079 022 070 degrees     QTc Int : 444 ms    Normal sinus rhythm  Cannot rule out Anterior infarct (cited on or before 02-JAN-2024)  Abnormal ECG  When compared with ECG of 02-JAN-2024 09:19,  No significant change was found    Referred By:             Confirmed By:                                   Imaging Results              X-Ray Chest PA And Lateral (Final result)  Result time 09/24/24 15:48:17      Final result by Louis Nj MD (09/24/24 15:48:17)                   Impression:      1. No acute radiographic abnormalities.      Electronically signed by:   Ondina  Date:    09/24/2024  Time:    15:48               Narrative:    EXAMINATION:  XR CHEST PA AND LATERAL    CLINICAL HISTORY:  Chest Pain;    COMPARISON:  January 2024    FINDINGS:  Heart size is normal.  The mediastinum is unremarkable.  No infiltrates or effusions are identified.  No acute osseous abnormality is demonstrated.  Changes of anterior instrumented fusion are noted in the lower cervical spine.                                       US Abdomen Limited (Final result)  Result time 09/24/24 15:38:52      Final result by Cleveland Figueroa MD (09/24/24 15:38:52)                   Impression:      1.  Background hepatic parenchymal findings suggesting steatosis.    2.  Prior cholecystectomy with no intra/extrahepatic biliary ductal dilation.    3.  Incidental small cyst in the lower pole the right kidney.    .      Electronically signed by: Cleveland Figueroa  Date:    09/24/2024  Time:    15:38               Narrative:    CLINICAL HISTORY:  ([id number])69 y/o  (1955) F    Upper abdominal pain;    TECHNIQUE:  (A#[id number], exam time 9/24/2024 15:34)    US ABDOMEN LIMITED [id number]    Right upper quadrant ultrasound, images obtained in grayscale and color. Additional Doppler images of the portal vein were obtained.    COMPARISON:  Ultrasound from 03/19/2020.    FINDINGS:  The LIVER is mildly prominent at 17.1 cm (sagittal right lobe). Hepatic parenchyma has increased echogenicity with poor delineation of the periportal triads. No definite intrahepatic masses are noted. The portal vein is patent with hepatopetal flow .    The BILIARY SYSTEM is normal in caliber; the common hepatic duct measures 5 mm.  The GALLBLADDER is surgically absent.    The PANCREATIC head and body are partially visualized but grossly normal in appearance. The pancreatic duct is not dilated.    The right KIDNEY is normal in size at 10.4 x 5.1 x 4.6 cm and echogenicity/texture.  There is a 16 mm simple cyst in the inferior pole  the right kidney.  No stones or hydronephrosis seen. No solid masses are noted RUQ    The AORTA and IVC are partially visualized and unremarkable.                                       Medications   lactated ringers bolus 1,000 mL (1,000 mLs Intravenous New Bag 9/24/24 5464)   oxyCODONE-acetaminophen  mg per tablet 1 tablet (has no administration in time range)     Medical Decision Making  Alessia Nelson is a 68 y.o. female with a past medical history of CAD status post PCI, COPD, chronic pancreatitis, previous bowel resection, hypertension, hyperlipidemia, and recurrent at palpitations that presents emergency department for epigastric/right upper quadrant abdominal pain as well as palpitations that has been ongoing since Saturday.  Vitals are stable.  Exam with nontoxic female.  Mild epigastric tenderness with nonspecific back pain.  Lungs are clear.  Heart sounds within normal limits.  No lower extremity edema.  No numbness or weakness.  Differential includes but is not limited to chronic pancreatitis, UTI, ACS, electrolyte imbalance, and biliary colic.  No leukocytosis or significant anemia seen on CBC.  CMP is unremarkable.  Lipase is negative.  BNP only mildly elevated.  Troponin is 6.8.  EKG showed normal sinus rhythm without acute ST segment or T-wave changes.  Patient's epigastric and back pain pain was initially well-controlled, but it being progressively worse while in the emergency department.  Initially tried to treat with p.o. Percocet 10 mg x2, and she did get some relief but it started to come back.  She did get 1 L of fluids as well. Attempted to p.o. challenge and patient became nauseated and had worsening pain.  Giving Dilaudid, fluids, and Zofran.  We will admit to observation given that patient has had total of 20 mg of Percocet and 1 mg of Dilaudid for chronic pancreatitis.    Risk  Prescription drug management.                                      Clinical Impression:  Final  diagnoses:  [R07.9] Chest pain                 Richard Oquendo MD  09/24/24 0560       Richard Oquendo MD  09/24/24 [id number]

## 2024-09-24 NOTE — Clinical Note
Diagnosis: Chronic pancreatitis, unspecified pancreatitis type [[id number]]   Future Attending Provider: MARIA E SANDHU [[id number]]   Place in Observation: [facility] [[id number]]   Special Needs:: No Special Needs [1]

## 2024-09-24 NOTE — FIRST PROVIDER EVALUATION
" Emergency Department TeleTriage Encounter Note      CHIEF COMPLAINT    Chief Complaint   Patient presents with    Palpitations     Complains of palpations since Saturday.  States that she feels like her heart is skipping a beat.  Denies chest pain    Abdominal Pain     States has a hx of chronic pancreatitis abdominal pain since Saturday.         VITAL SIGNS   Initial Vitals [09/24/24 1438]   BP Pulse Resp Temp SpO2   (!) 151/75 80 19 99 °F (37.2 °C) (!) 93 %      MAP       --            ALLERGIES    Review of patient's allergies indicates:   Allergen Reactions    Aspirin Other (See Comments)     "Makes stomach feel like it's on fire"    Phenergan [promethazine] Other (See Comments)     SEIZURES    Lortab [hydrocodone-acetaminophen] Itching     Able to take generic Norco       PROVIDER TRIAGE NOTE  Reports palpitations for the past four days. Unsure if she is having chest pain or acid reflux.Reports some SOB that she attributes to weight gain since January.   Also has a history of chronic pancreatitis and has had abdominal pain since eating popcorn 5 days ago.     Limited physical exam via telehealth: The patient is awake, alert, answering questions appropriately and is not in respiratory distress.  As the Teletriage provider, I performed an initial assessment and ordered appropriate labs and imaging studies, if any, to facilitate the patient's care once placed in the ED. Once a room is available, care and a full evaluation will be completed by an alternate ED provider.  Any additional orders and the final disposition will be determined by that provider.  All imaging and labs will not be followed-up by the Teletriage Team, including myself.          ORDERS  Labs Reviewed - No data to display    ED Orders (720h ago, onward)      Start Ordered     Status Ordering Provider    09/24/24 1448 09/24/24 1448  X-Ray Chest PA And Lateral  1 time imaging         Ordered ENA JUÁREZ    09/24/24 1448 09/24/24 1448  " Troponin I High Sensitivity  Once         Ordered DARDAR, ENA A.    09/24/24 1448 09/24/24 1448  Lipase  STAT         Ordered DARDAR, ENA A.    09/24/24 1448 09/24/24 1448  US Abdomen Limited  1 time imaging         Ordered DARDAR, ENA A.    09/24/24 1447 09/24/24 1448  Vital signs  Every 15 min         Ordered DARDAR ENA A.    09/24/24 1447 09/24/24 1448  Cardiac Monitoring - Adult  Continuous        Comments: Notify Physician If:    Ordered DARDAR, ENA A.    09/24/24 1447 09/24/24 1448  Pulse Oximetry Continuous  Continuous         Ordered DARDAR, ENA A.    09/24/24 1447 09/24/24 1448  Diet NPO  Diet effective now         Ordered ELLIEDAR, ENA A.    09/24/24 1447 09/24/24 1448  Saline lock IV  Once         Ordered ELLIEDARJULIANA A.    09/24/24 1447 09/24/24 1448  EKG 12-lead  Once        Comments: Do not perform if previously done during this visit/ triage    Ordered ELLIEDAR, ENA A.    09/24/24 1447 09/24/24 1448  CBC auto differential  STAT         Ordered ELLIEDARJULIANA A.    09/24/24 1447 09/24/24 1448  Comprehensive metabolic panel  STAT         Ordered DARDAR, ENA A.    09/24/24 1447 09/24/24 1448  B-Type natriuretic peptide (BNP)  STAT         Ordered JULIAN JUÁREZA A.              Virtual Visit Note: The provider triage portion of this emergency department evaluation and documentation was performed via [employer], a HIPAA-compliant telemedicine application, in concert with a tele-presenter in the room. A face to face patient evaluation with one of my colleagues will occur once the patient is placed in an emergency department room.      DISCLAIMER: This note was prepared with [employer] voice recognition transcription software. Garbled syntax, mangled pronouns, and other bizarre constructions may be attributed to that software system.

## 2024-09-25 LAB
ALBUMIN SERPL BCP-MCNC: 3.9 G/DL (ref 3.5–5.2)
ALP SERPL-CCNC: 86 U/L (ref 55–135)
ALT SERPL W/O P-5'-P-CCNC: 13 U/L (ref 10–44)
ANION GAP SERPL CALC-SCNC: 6 MMOL/L (ref 8–16)
AST SERPL-CCNC: 16 U/L (ref 10–40)
BASOPHILS # BLD AUTO: 0.05 K/UL (ref 0–0.2)
BASOPHILS NFR BLD: 0.5 % (ref 0–1.9)
BILIRUB SERPL-MCNC: 0.3 MG/DL (ref 0.1–1)
BILIRUB UR QL STRIP: NEGATIVE
BUN SERPL-MCNC: 18 MG/DL (ref 8–23)
CALCIUM SERPL-MCNC: 9.9 MG/DL (ref 8.7–10.5)
CHLORIDE SERPL-SCNC: 106 MMOL/L (ref 95–110)
CLARITY UR: CLEAR
CO2 SERPL-SCNC: 27 MMOL/L (ref 23–29)
COLOR UR: COLORLESS
CREAT SERPL-MCNC: 0.9 MG/DL (ref 0.5–1.4)
DIFFERENTIAL METHOD BLD: ABNORMAL
EOSINOPHIL # BLD AUTO: 0.2 K/UL (ref 0–0.5)
EOSINOPHIL NFR BLD: 2 % (ref 0–8)
ERYTHROCYTE [DISTWIDTH] IN BLOOD BY AUTOMATED COUNT: 14.8 % (ref 11.5–14.5)
EST. GFR  (NO RACE VARIABLE): >60 ML/MIN/1.73 M^2
GLUCOSE SERPL-MCNC: 103 MG/DL (ref 70–110)
GLUCOSE UR QL STRIP: NEGATIVE
HCT VFR BLD AUTO: 33.6 % (ref 37–48.5)
HGB BLD-MCNC: 10.5 G/DL (ref 12–16)
HGB UR QL STRIP: NEGATIVE
IMM GRANULOCYTES # BLD AUTO: 0.04 K/UL (ref 0–0.04)
IMM GRANULOCYTES NFR BLD AUTO: 0.4 % (ref 0–0.5)
KETONES UR QL STRIP: NEGATIVE
LEUKOCYTE ESTERASE UR QL STRIP: ABNORMAL
LYMPHOCYTES # BLD AUTO: 3 K/UL (ref 1–4.8)
LYMPHOCYTES NFR BLD: 32.1 % (ref 18–48)
MAGNESIUM SERPL-MCNC: 1.3 MG/DL (ref 1.6–2.6)
MCH RBC QN AUTO: 30 PG (ref 27–31)
MCHC RBC AUTO-ENTMCNC: 31.3 G/DL (ref 32–36)
MCV RBC AUTO: 96 FL (ref 82–98)
MICROSCOPIC COMMENT: ABNORMAL
MONOCYTES # BLD AUTO: 0.7 K/UL (ref 0.3–1)
MONOCYTES NFR BLD: 7.6 % (ref 4–15)
NEUTROPHILS # BLD AUTO: 5.3 K/UL (ref 1.8–7.7)
NEUTROPHILS NFR BLD: 57.4 % (ref 38–73)
NITRITE UR QL STRIP: NEGATIVE
NRBC BLD-RTO: 0 /100 WBC
PH UR STRIP: 6 [PH] (ref 5–8)
PLATELET # BLD AUTO: 245 K/UL (ref 150–450)
PMV BLD AUTO: 8.5 FL (ref 9.2–12.9)
POTASSIUM SERPL-SCNC: 3.8 MMOL/L (ref 3.5–5.1)
PROT SERPL-MCNC: 6.3 G/DL (ref 6–8.4)
PROT UR QL STRIP: NEGATIVE
RBC # BLD AUTO: 3.5 M/UL (ref 4–5.4)
RBC #/AREA URNS HPF: 1 /HPF (ref 0–4)
SODIUM SERPL-SCNC: 139 MMOL/L (ref 136–145)
SP GR UR STRIP: 1.02 (ref 1–1.03)
SQUAMOUS #/AREA URNS HPF: 1 /HPF
URN SPEC COLLECT METH UR: ABNORMAL
UROBILINOGEN UR STRIP-ACNC: NEGATIVE EU/DL
WBC # BLD AUTO: 9.22 K/UL (ref 3.9–12.7)
WBC #/AREA URNS HPF: 7 /HPF (ref 0–5)

## 2024-09-25 PROCEDURE — 96376 TX/PRO/DX INJ SAME DRUG ADON: CPT

## 2024-09-25 PROCEDURE — 80053 COMPREHEN METABOLIC PANEL: CPT | Performed by: STUDENT IN AN ORGANIZED HEALTH CARE EDUCATION/TRAINING PROGRAM

## 2024-09-25 PROCEDURE — 96365 THER/PROPH/DIAG IV INF INIT: CPT

## 2024-09-25 PROCEDURE — 81003 URINALYSIS AUTO W/O SCOPE: CPT | Performed by: HOSPITALIST

## 2024-09-25 PROCEDURE — 81001 URINALYSIS AUTO W/SCOPE: CPT | Performed by: HOSPITALIST

## 2024-09-25 PROCEDURE — 96372 THER/PROPH/DIAG INJ SC/IM: CPT | Performed by: STUDENT IN AN ORGANIZED HEALTH CARE EDUCATION/TRAINING PROGRAM

## 2024-09-25 PROCEDURE — 96375 TX/PRO/DX INJ NEW DRUG ADDON: CPT

## 2024-09-25 PROCEDURE — 96366 THER/PROPH/DIAG IV INF ADDON: CPT

## 2024-09-25 PROCEDURE — 25000003 PHARM REV CODE 250: Performed by: STUDENT IN AN ORGANIZED HEALTH CARE EDUCATION/TRAINING PROGRAM

## 2024-09-25 PROCEDURE — 85025 COMPLETE CBC W/AUTO DIFF WBC: CPT | Performed by: STUDENT IN AN ORGANIZED HEALTH CARE EDUCATION/TRAINING PROGRAM

## 2024-09-25 PROCEDURE — 63600175 PHARM REV CODE 636 W HCPCS: Performed by: STUDENT IN AN ORGANIZED HEALTH CARE EDUCATION/TRAINING PROGRAM

## 2024-09-25 PROCEDURE — 63600175 PHARM REV CODE 636 W HCPCS: Performed by: HOSPITALIST

## 2024-09-25 PROCEDURE — 36415 COLL VENOUS BLD VENIPUNCTURE: CPT | Performed by: STUDENT IN AN ORGANIZED HEALTH CARE EDUCATION/TRAINING PROGRAM

## 2024-09-25 PROCEDURE — 12000002 HC ACUTE/MED SURGE SEMI-PRIVATE ROOM

## 2024-09-25 PROCEDURE — 83735 ASSAY OF MAGNESIUM: CPT | Performed by: STUDENT IN AN ORGANIZED HEALTH CARE EDUCATION/TRAINING PROGRAM

## 2024-09-25 PROCEDURE — 96361 HYDRATE IV INFUSION ADD-ON: CPT

## 2024-09-25 RX ORDER — DIPHENHYDRAMINE HCL 25 MG
25 CAPSULE ORAL EVERY 6 HOURS PRN
Status: DISCONTINUED | OUTPATIENT
Start: 2024-09-25 | End: 2024-10-01 | Stop reason: HOSPADM

## 2024-09-25 RX ORDER — HYDRALAZINE HYDROCHLORIDE 20 MG/ML
10 INJECTION INTRAMUSCULAR; INTRAVENOUS EVERY 6 HOURS PRN
Status: DISCONTINUED | OUTPATIENT
Start: 2024-09-25 | End: 2024-09-30

## 2024-09-25 RX ORDER — ONDANSETRON HYDROCHLORIDE 2 MG/ML
4 INJECTION, SOLUTION INTRAVENOUS EVERY 4 HOURS PRN
Status: DISCONTINUED | OUTPATIENT
Start: 2024-09-25 | End: 2024-10-01 | Stop reason: HOSPADM

## 2024-09-25 RX ORDER — MAGNESIUM SULFATE HEPTAHYDRATE 40 MG/ML
2 INJECTION, SOLUTION INTRAVENOUS ONCE
Status: COMPLETED | OUTPATIENT
Start: 2024-09-25 | End: 2024-09-25

## 2024-09-25 RX ORDER — CHOLECALCIFEROL (VITAMIN D3) 25 MCG
1000 TABLET ORAL 2 TIMES DAILY
COMMUNITY

## 2024-09-25 RX ADMIN — MORPHINE SULFATE 2 MG: 2 INJECTION, SOLUTION INTRAMUSCULAR; INTRAVENOUS at 05:09

## 2024-09-25 RX ADMIN — Medication 800 MG: at 08:09

## 2024-09-25 RX ADMIN — PANCRELIPASE 3 CAPSULE: 60000; 12000; 38000 CAPSULE, DELAYED RELEASE PELLETS ORAL at 04:09

## 2024-09-25 RX ADMIN — HYDRALAZINE HYDROCHLORIDE 10 MG: 20 INJECTION INTRAMUSCULAR; INTRAVENOUS at 03:09

## 2024-09-25 RX ADMIN — ONDANSETRON 4 MG: 2 INJECTION INTRAMUSCULAR; INTRAVENOUS at 12:09

## 2024-09-25 RX ADMIN — PANTOPRAZOLE SODIUM 40 MG: 40 TABLET, DELAYED RELEASE ORAL at 06:09

## 2024-09-25 RX ADMIN — ENOXAPARIN SODIUM 40 MG: 40 INJECTION SUBCUTANEOUS at 12:09

## 2024-09-25 RX ADMIN — MAGNESIUM SULFATE HEPTAHYDRATE 2 G: 40 INJECTION, SOLUTION INTRAVENOUS at 02:09

## 2024-09-25 RX ADMIN — GABAPENTIN 400 MG: 300 CAPSULE ORAL at 08:09

## 2024-09-25 RX ADMIN — ENOXAPARIN SODIUM 40 MG: 40 INJECTION SUBCUTANEOUS at 04:09

## 2024-09-25 RX ADMIN — ATORVASTATIN CALCIUM 20 MG: 20 TABLET, FILM COATED ORAL at 08:09

## 2024-09-25 RX ADMIN — PANCRELIPASE 3 CAPSULE: 60000; 12000; 38000 CAPSULE, DELAYED RELEASE PELLETS ORAL at 08:09

## 2024-09-25 RX ADMIN — ACETAMINOPHEN 650 MG: 325 TABLET ORAL at 05:09

## 2024-09-25 RX ADMIN — ACETAMINOPHEN 650 MG: 325 TABLET ORAL at 02:09

## 2024-09-25 RX ADMIN — BUSPIRONE HYDROCHLORIDE 5 MG: 5 TABLET ORAL at 08:09

## 2024-09-25 RX ADMIN — TRAZODONE HYDROCHLORIDE 100 MG: 50 TABLET ORAL at 08:09

## 2024-09-25 RX ADMIN — METOPROLOL SUCCINATE 25 MG: 25 TABLET, EXTENDED RELEASE ORAL at 08:09

## 2024-09-25 RX ADMIN — MORPHINE SULFATE 2 MG: 2 INJECTION, SOLUTION INTRAMUSCULAR; INTRAVENOUS at 11:09

## 2024-09-25 RX ADMIN — SODIUM CHLORIDE, POTASSIUM CHLORIDE, SODIUM LACTATE AND CALCIUM CHLORIDE: 600; 310; 30; 20 INJECTION, SOLUTION INTRAVENOUS at 02:09

## 2024-09-25 RX ADMIN — LEVOTHYROXINE SODIUM 75 MCG: 0.03 TABLET ORAL at 06:09

## 2024-09-25 RX ADMIN — PANCRELIPASE 3 CAPSULE: 60000; 12000; 38000 CAPSULE, DELAYED RELEASE PELLETS ORAL at 11:09

## 2024-09-25 RX ADMIN — MORPHINE SULFATE 2 MG: 2 INJECTION, SOLUTION INTRAMUSCULAR; INTRAVENOUS at 04:09

## 2024-09-25 RX ADMIN — ESCITALOPRAM OXALATE 20 MG: 10 TABLET ORAL at 08:09

## 2024-09-25 RX ADMIN — DIPHENHYDRAMINE HYDROCHLORIDE 25 MG: 25 CAPSULE ORAL at 12:09

## 2024-09-25 RX ADMIN — CLOPIDOGREL BISULFATE 75 MG: 75 TABLET, FILM COATED ORAL at 08:09

## 2024-09-25 RX ADMIN — MORPHINE SULFATE 2 MG: 2 INJECTION, SOLUTION INTRAMUSCULAR; INTRAVENOUS at 09:09

## 2024-09-25 RX ADMIN — ALLOPURINOL 300 MG: 300 TABLET ORAL at 08:09

## 2024-09-25 NOTE — PROGRESS NOTES
[facility] - Emergency Dept  Hospital Medicine  Progress Note    Patient Name: Alessia Nelson  MRN: [id number]  Patient Class: IP- Inpatient   Admission Date: 9/24/2024  Length of Stay: 0 days  Attending Physician: Nory Enciso MD  Primary Care Provider: Luana Calvert MD        Subjective:     Principal Problem:<principal problem not specified>        HPI:  The patient is a 68-year-old female with past medical history of CKD stage 3b, CAD, recurrent C diff, chronic pancreatitis, pancreatic insufficiency, chronic diarrhea, status post right hemicolectomy due to complication from abdominal surgery, GERD, hypertension, hyperlipidemia, COPD who presented to the ED  for the evaluation of abdominal pain.    The patient reports that she started having abdominal pain since Saturday.  She reports that she started having pain in the lower part of the abdomen at 1st.  She also reports that she started getting pain in the back which slowly progressed to the front of the abdomen.  She complains of nausea but no vomiting. She reports that she has not passed flatus today but had bowel movement this morning.  She reports that she has history of chronic pancreatitis.  She reports that she has chronic diarrhea and is on colestipol and pancreatic enzyme for that.  She denies any fever, chills, cough, chest pain, shortness of breath.  She complains of bilateral swelling of lower extremities.  She denies any orthopnea, PND.  She reports that she has mild COPD and quit smoking about 2 years ago.      Overview/Hospital Course:  Patient admitted with abdominal pain and ongoing diarrhea despite stool bulking agent and Creon. She feels like this is another pancreatitis attack like previous ones. CT imaging did not show acute findings regarding the pancreas. Possible cystitis may explain the lower abdominal pain and started on Rocephin. UA pending. Giovana zhao has chronic pain from an ortho standpoint with right knee  replacement and thoracic and lumbar spine disease for which she goes to [facility] orth[facility].   Today, pain reported 10/10 despite medications given. GI consulted.     Interval History: pt reports pancreatitis meds not helping and having ongoing abdominal pain, would like to see a specialist     Review of Systems   Constitutional:  Positive for appetite change.   Respiratory:  Positive for shortness of breath.    Cardiovascular: Negative.    Gastrointestinal:  Positive for abdominal distention, abdominal pain, diarrhea and nausea. Negative for vomiting.   Genitourinary:  Positive for pelvic pain.   Musculoskeletal:  Positive for arthralgias and back pain.   Skin: Negative.    Neurological: Negative.    Psychiatric/Behavioral: Negative.       Objective:     Vital Signs (Most Recent):  Temp: 97.6 °F (36.4 °C) (09/25/24 1110)  Pulse: 63 (09/25/24 1110)  Resp: 20 (09/25/24 1110)  BP: (!) 164/67 (09/25/24 1110)  SpO2: 98 % (09/25/24 1110) Vital Signs (24h Range):  Temp:  [97.6 °F (36.4 °C)-99 °F (37.2 °C)] 97.6 °F (36.4 °C)  Pulse:  [59-80] 63  Resp:  [14-20] 20  SpO2:  [93 %-99 %] 98 %  BP: (125-184)/(60-79) 164/67     Weight: 86.2 kg (190 lb)  Body mass index is 29.76 kg/m².    Intake/Output Summary (Last 24 hours) at 9/25/2024 1406  Last data filed at 9/25/2024 0902  Gross per 24 hour   Intake 1240 ml   Output --   Net 1240 ml         Physical Exam  Constitutional:       General: She is not in acute distress.  HENT:      Head: Normocephalic and atraumatic.      Mouth/Throat:      Mouth: Mucous membranes are moist.      Pharynx: Oropharynx is clear.   Cardiovascular:      Rate and Rhythm: Normal rate and regular rhythm.   Pulmonary:      Effort: Pulmonary effort is normal. No respiratory distress.      Breath sounds: No wheezing.   Abdominal:      General: There is distension.      Tenderness: There is abdominal tenderness. There is no right CVA tenderness, left CVA tenderness, guarding or rebound.      Hernia:  A hernia is present.   Musculoskeletal:         General: Swelling and tenderness present.      Cervical back: Normal range of motion and neck supple.   Skin:     General: Skin is warm and dry.   Neurological:      General: No focal deficit present.      Mental Status: She is alert and oriented to person, place, and time. Mental status is at baseline.   Psychiatric:         Mood and Affect: Mood normal.         Behavior: Behavior normal.             Significant Labs: All pertinent labs within the past 24 hours have been reviewed.  Recent Lab Results         09/25/24  0542   09/24/24  1508        Albumin 3.9   4.1       ALP 86   90       ALT 13   12       Anion Gap 6   11       AST 16   17       Baso # 0.05   0.07       Basophil % 0.5   0.8       BILIRUBIN TOTAL 0.3  Comment: For infants and newborns, interpretation of results should be based  on gestational age, weight and in agreement with clinical  observations.    Premature Infant recommended reference ranges:  Up to 24 hours.............<8.0 mg/dL  Up to 48 hours............<12.0 mg/dL  3-5 days..................<15.0 mg/dL  6-29 days.................<15.0 mg/dL     0.3  Comment: For infants and newborns, interpretation of results should be based  on gestational age, weight and in agreement with clinical  observations.    Premature Infant recommended reference ranges:  Up to 24 hours.............<8.0 mg/dL  Up to 48 hours............<12.0 mg/dL  3-5 days..................<15.0 mg/dL  6-29 days.................<15.0 mg/dL         BNP   112  Comment: Values of less than 100 pg/ml are consistent with non-CHF populations.       BUN 18   16       Calcium 9.9   10.1       Chloride 106   108       CO2 27   24       Creatinine 0.9   0.9       Differential Method Automated   Automated       eGFR >60.0   >60.0       Eos # 0.2   0.2       Eos % 2.0   2.5       Glucose 103   156       Gran # (ANC) 5.3   5.9       Gran % 57.4   64.6       Hematocrit 33.6   35.5        Hemoglobin 10.5   10.9       Immature Grans (Abs) 0.04  Comment: Mild elevation in immature granulocytes is non specific and   can be seen in a variety of conditions including stress response,   acute inflammation, trauma and pregnancy. Correlation with other   laboratory and clinical findings is essential.     0.05  Comment: Mild elevation in immature granulocytes is non specific and   can be seen in a variety of conditions including stress response,   acute inflammation, trauma and pregnancy. Correlation with other   laboratory and clinical findings is essential.         Immature Granulocytes 0.4   0.5       Lipase   10       Lymph # 3.0   2.2       Lymph % 32.1   24.4       Magnesium  1.3         MCH 30.0   29.4       MCHC 31.3   30.7       MCV 96   96       Mono # 0.7   0.7       Mono % 7.6   7.2       MPV 8.5   9.0       nRBC 0   0       Platelet Count 245   261       Potassium 3.8   4.0       PROTEIN TOTAL 6.3   6.8       RBC 3.50   3.71       RDW 14.8   14.6       Sodium 139   143       Troponin I High Sensitivity   6.8  Comment: Troponin results differ between methods. Do not use   results between Troponin methods interchangeably.    Alkaline Phospatase levels above 400 U/L may   cause false positive results.    Access hsTnI should not be used for patients taking   Asfotase whitley (Strensiq).         WBC 9.22   9.13               Significant Imaging: I have reviewed all pertinent imaging results/findings within the past 24 hours.    Assessment/Plan:      Lumbar radiculopathy  Resume pain meds and gabapentin, robaxin  Follow up with [facility]     Hypothyroidism due to acquired atrophy of thyroid    Resume synthroid  Check TSH and FT4      Pancreatic insufficiency  Continue Creon and stool bulking agent  Deer to Gi for any further recs       Hypomagnesemia  Patient has Abnormal Magnesium: hypomagnesemia. Will continue to monitor electrolytes closely. Will replace the affected electrolytes and  repeat labs to be done after interventions completed. The patient's magnesium results have been reviewed and are listed below.  Recent Labs   Lab 09/25/24  0542   MG 1.3*    Mg SO4 2gm IV     History of pancreatitis  On cholestyramine and Creon - she reports not helpful  Abdominal pain increased over past two days  On clear liquids  Pain control  IVF  GI consulted  Imaging did not suggest acute findings of the pancreas          VTE Risk Mitigation (From admission, onward)           Ordered     enoxaparin injection 40 mg  Daily         09/24/24 2304     IP VTE HIGH RISK PATIENT  Once         09/24/24 2304     Place sequential compression device  Until discontinued         09/24/24 2304                    Discharge Planning   JOSE ANTONIO:      Code Status: Full Code   Is the patient medically ready for discharge?:     Reason for patient still in hospital (select all that apply): Patient trending condition                     Nory Enciso MD  Department of Hospital Medicine   Former[facility] - Emergency Dept

## 2024-09-25 NOTE — NURSING
Pt upset about being moved to hallway, not having a bed and having a headache. Pt states already talked to someone about this and is still waiting and would like to talk to someone in charge. Empathetic listening provided, offered pain medication, charge nurse and house supervisor notified. Pt reassurance provided, pt states no other needs at this moment.

## 2024-09-25 NOTE — CONSULTS
GASTROENTEROLOGY INPATIENT CONSULT NOTE  Patient Name: Alessia Nelson  Patient MRN: [id number]  Patient : 1955    Admit Date: 2024  Service date: 2024    Reason for Consult:  Lower abdominal pain    PCP: Luana Calvert MD    Chief Complaint   Patient presents with    Palpitations     Complains of palpations since Saturday.  States that she feels like her heart is skipping a beat.  Denies chest pain    Abdominal Pain     States has a hx of chronic pancreatitis abdominal pain since Saturday.         HPI: Patient is 68-year-old female with past medical history of CKD stage 3b, CAD, recurrent C diff, chronic pancreatitis, pancreatic insufficiency, chronic diarrhea, status post right hemicolectomy due to complication from abdominal surgery, GERD, hypertension, hyperlipidemia, COPD who presented to the ED  for the evaluation of abdominal pain.     The patient reports that she started having abdominal pain since Saturday.  She reports that she started having pain in the lower part of the abdomen at .  She also reports that she started getting pain in the back which slowly progressed to the front of the abdomen.  She complains of nausea but no vomiting. She reports that she has not passed flatus today but had bowel movement this morning.  She reports that she has history of chronic pancreatitis.  She reports that she has chronic diarrhea and is on colestipol and pancreatic enzyme for that.  She denies any fever, chills, cough, chest pain, shortness of breath.  She complains of bilateral swelling of lower extremities.  She denies any orthopnea, PND.  She reports that she has mild COPD and quit smoking about 2 years ago.     She reports send Pap is unaffordable and has not been taking that for several months.  She will take 2 colestipol at once if she has 1-2 bowel movements and that normally controls her stool volume throughout the remainder of the day.  She also takes pain medicine for her back  resulting in lack of diarrhea.  She reports some nausea.  And epigastric discomfort      CHART REVIEW:  03/08/2022 lower endoscopic ultrasound with patent ileocolonic anastomosis.  Transverse colon tattoo.  Small polyps.  Endoscopic ultrasound of lipoma in the transverse colon  06/10/2021 EGD with gastritis   06/10/2021 colonoscopy with Dr. Chávez.  Small polyps.  Diverticulosis.  Tumor in the transverse colon.  Ileocolonic anastomosis    11/02/2020 upper endoscopic ultrasound with pancreatic parenchymal abnormalities consisting of lobularity in the entire pancreas.  One enlarged lymph node in the vernon hepatis  11/25/2019 upper endoscopic ultrasound with normal celiac trunk.  Pancreatic atrophy and lobularity        Past Medical History:  Past Medical History:   Diagnosis Date    Acute pancreatitis     Back pain     CAD (coronary artery disease)     CHF (congestive heart failure)     COPD (chronic obstructive pulmonary disease)     Depression     Diverticulosis     Encounter for blood transfusion     GERD (gastroesophageal reflux disease)     History of blood clots     1986 AFTER BOWEL RESCETION    History of bowel resection     Hyperlipidemia     Hypertension     Peritonitis     Pulmonary nodule     Seizures     HAD A SEIZURE FROM PHENERGAN     Stroke     Thyroid disease     TIA (transient ischemic attack)     Wears dentures     upper        Past Surgical History:  Past Surgical History:   Procedure Laterality Date    ADRENAL GLAND SURGERY      APPENDECTOMY      BACK SURGERY      CAUDAL EPIDURAL STEROID INJECTION N/A 3/1/2021    Procedure: Injection-steroid-epidural-caudal;  Surgeon: Socorro Lorenz MD;  Location: [facility] OR;  Service: Pain Management;  Laterality: N/A;    CHOLECYSTECTOMY      COLONOSCOPY      COLONOSCOPY N/A 6/10/2021    Procedure: COLONOSCOPY;  Surgeon: Sheree Metz MD;  Location: [facility];  Service: Endoscopy;  Laterality: N/A;    COLONOSCOPY N/A 3/8/2022    Procedure: COLONOSCOPY;   Surgeon: Maurilio Rodriguez MD;  [facility] (2ND FLR);  Service: Endoscopy;  Laterality: N/A;  Pt to perform at-home COVID test morning of procedure-DS  [facility]see [facility] tab [facility]DS  3/2-Instructions sent via email-DS  3/7-Pt unable to come earlier due to ride-DS    CORONARY STENT PLACEMENT      CYSTOURETHROSCOPY N/A 11/13/2019    Procedure: CYSTOURETHROSCOPY;  Surgeon: Shun Nuñez MD;  Location: [facility] OR;  Service: Urology;  Laterality: N/A;    DIRECT DIAGNOSTIC LARYNGOSCOPY WITH BRONCHOSCOPY AND ESOPHAGOSCOPY N/A 1/3/2024    Procedure: LARYNGOSCOPY, DIRECT, DIAGNOSTIC, WITH BRONCHOSCOPY AND ESOPHAGOSCOPY;  Surgeon: Mir Belle MD;  Location: [facility] OR;  Service: ENT;  Laterality: N/A;    ENDOSCOPIC ULTRASOUND OF LOWER GASTROINTESTINAL TRACT N/A 3/8/2022    Procedure: ULTRASOUND, LOWER GI TRACT, ENDOSCOPIC;  Surgeon: Maurilio Rodriguez MD;  Location: [facility] ([address] [place] [address]);  Service: Endoscopy;  Laterality: N/A;  Pt to perform at-home COVID test morning of procedure-DS  2/24-[facility] (see letters tab 2/24/22)-DS  3/2-Instructions sent via email-DS  3/7-Pt unable to come earlier due to ride-DS    ENDOSCOPIC ULTRASOUND OF UPPER GASTROINTESTINAL TRACT N/A 11/25/2019    Procedure: ULTRASOUND, UPPER GI TRACT, ENDOSCOPIC;  Surgeon: Alhaji Bridges MD;  [facility]  Service: Endoscopy;  Laterality: N/A;  5 day hold Plavix, Dr Jovanni Serrano - pg  PM prep    ENDOSCOPIC ULTRASOUND OF UPPER GASTROINTESTINAL TRACT N/A 11/2/2020    Procedure: ULTRASOUND, UPPER GI TRACT, ENDOSCOPIC;  Surgeon: Maurilio Rodriguez MD;  Location: [facility] ENDO (2ND FLR);  Service: Endoscopy;  Laterality: N/A;  5 day hold Plavix, Dr Roman Walsh - pg  Covid-19 test 10/30/20 at [place] -     EPIDURAL STEROID INJECTION INTO CERVICAL SPINE N/A 12/8/2021    Procedure: Injection-steroid-epidural-cervical;  Surgeon: Socorro Lorenz MD;  Location: [facility]  OR;  Service: Pain Management;  Laterality: N/A;    EPIDURAL STEROID INJECTION INTO CERVICAL SPINE N/A 11/30/2023    Procedure: Injection-steroid-epidural-cervical;  Surgeon: Maurilio Carroll MD;  Location: [facility] OR;  Service: Anesthesiology;  Laterality: N/A;  c7-T1    ESOPHAGOGASTRODUODENOSCOPY N/A 6/10/2021    Procedure: EGD (ESOPHAGOGASTRODUODENOSCOPY);  Surgeon: Sheree Metz MD;  Location: [facility] ENDO;  Service: Endoscopy;  Laterality: N/A;    ESOPHAGOSCOPY, USING BOUGIE, WITH DILATION N/A 1/3/2024    Procedure: ESOPHAGOSCOPY, USING BOUGIE, WITH DILATION;  Surgeon: Mir Belle MD;  Location: [facility] OR;  Service: ENT;  Laterality: N/A;    FRACTURE SURGERY Left 05/02/2022    ankle    HERNIA REPAIR      HYSTERECTOMY      INCISIONAL HERNIA REPAIR      INJECTION OF ANESTHETIC AGENT AROUND NERVE Right 5/27/2019    Procedure: RIGHT L5-S3 MEDIAL BRANCH BLOCKS;  Surgeon: Sneha Aragon MD;  Location: [facility] OR;  Service: Pain Management;  Laterality: Right;  **DO NOT STOP PLAVIX**    INJECTION OF JOINT Right 9/22/2021    Procedure: Injection, Joint; Right SI Joint Injection;  Surgeon: oScorro Lorenz MD;  Location: [facility] OR;  Service: Pain Management;  Laterality: Right;  Oral    INSERTION OF DORSAL COLUMN NERVE STIMULATOR FOR TRIAL N/A 6/7/2021    Procedure: INSERTION, NEUROSTIMULATOR, SPINAL CORD, DORSAL COLUMN, FOR TRIAL;  Surgeon: Socorro Lorenz MD;  Location: [facility] OR;  Service: Pain Management;  Laterality: N/A;  nevro rep confirmed start time    instestine      bowel section    KNEE ARTHROPLASTY Right 12/18/2019    Procedure: ARTHROPLASTY, KNEE;  Surgeon: Ike Briceño II, MD;  Location: [facility] OR;  Service: Orthopedics;  Laterality: Right;    KNEE SURGERY  1983    OOPHORECTOMY      PARATHYROID GLAND SURGERY      3 surgeries    RADIOFREQUENCY ABLATION Right 6/10/2019    Procedure: Radiofrequency Ablation - RIGHT L3-5 RADIOFREQUENCY ABLATION WITH HALYARD COOLIEF THERMAL SYSTEM;  Surgeon: Sneha BRIONES  "MD Margo;  Location: [facility] OR;  Service: Pain Management;  Laterality: Right;  **HOLD PLAVIX x 7 DAYS PRIOR**    SPINAL CORD STIMULATOR REMOVAL N/A 2024    Procedure: REMOVAL, NEUROSTIMULATOR, SPINAL;  Surgeon: Maurilio Carroll MD;  Location: [facility] OR;  Service: Pain Management;  Laterality: N/A;  removal of spinal cord stimulator    UPPER GASTROINTESTINAL ENDOSCOPY          Home Medications:  (Not in a hospital admission)      Inpatient Medications:  Review of patient's allergies indicates:   Allergen Reactions    Aspirin Other (See Comments)     "Makes stomach feel like it's on fire"    Phenergan [promethazine] Other (See Comments)     SEIZURES    Lortab [hydrocodone-acetaminophen] Itching     Able to take generic Norco       Social History:   Social History     Occupational History    Not on file   Tobacco Use    Smoking status: Former     Current packs/day: 0.00     Average packs/day: 1 pack/day for 46.0 years (46.0 ttl pk-yrs)     Types: Cigarettes     Start date: 1977     Quit date: 2023     Years since quittin.7    Smokeless tobacco: Never   Substance and Sexual Activity    Alcohol use: Not Currently    Drug use: No    Sexual activity: Not Currently       Family History:   Family History   Problem Relation Name Age of Onset    Stroke Maternal Grandmother      Cancer Maternal Grandfather      Stroke Mother      Heart disease Father      Breast cancer Sister         Review of Systems:  GENERAL: No fever, chills, weight loss  SKIN: No rashes, jaundice, pruritus  HEENT: No rhinorrhea, epistaxis, vision changes.  No trauma, tinnitus, lymphadenopathy or pharyngitis  CV: No chest pain, palpitations, edima or WATERS  PULM: No cough or sputum production.  No wheezing.  GI: AS IN HPI  URINARY:  No hematuria, dysuria  MS: No change in muscle or joint pain, weakness, or ROM  Neuro: No focal neurologic changes  PSYCH: No change in mood or personality.  No suicidal ideation.  ENDOCRINE: No " "fatigue      OBJECTIVE:    Vitals:    09/25/24 1106 09/25/24 1110 09/25/24 1458 09/25/24 1601   BP:  (!) 164/67 (!) 169/79    BP Location:   Left arm    Patient Position:   Sitting    Pulse:  63 64    Resp: 18 20 20 20   Temp:  97.6 °F (36.4 °C) 97.6 °F (36.4 °C)    TempSrc:  Axillary Oral    SpO2:  98% 98%    Weight:       Height:         Physical Exam:    GEN: well-developed, well-nourished, awake and alert, non-toxic appearing adult  HEENT: PERRL, sclera anicteric, oral mucosa pink and moist without lesion  NECK: trachea midline; Good ROM  CV: regular rate and rhythm, no murmurs or gallops  RESP: clear to auscultation bilaterally, no wheezes, rhonci or rales  ABD: soft, non-tender, non-distended, normal bowel sounds  EXT: no swelling or edema, 2+ pulses distally  SKIN: no rashes or jaundice  PSYCH: normal affect    Labs:   Recent Labs   Lab 09/24/24  1508 09/25/24  0542   WBC 9.13 9.22   HGB 10.9* 10.5*    245   MCV 96 96     No results for input(s): "IRON", "FERRITIN" in the last 168 hours.    Invalid input(s): "IRONSAT"  Recent Labs   Lab 09/24/24  1508 09/25/24  0542    139   K 4.0 3.8   BUN 16 18   CREATININE 0.9 0.9   ALBUMIN 4.1 3.9   AST 17 16   ALT 12 13   ALKPHOS 90 86            Radiology Review:  Imaging Results              CT Abdomen Pelvis With IV Contrast Routine Oral Contrast (Final result)  Result time 09/25/24 01:02:57      Final result by Andrey Domínguez MD (09/25/24 01:02:57)                   Impression:      The liver appears enlarged and there is diminished attenuation consistent with diffuse fatty infiltrate.    Hypodensity of the right kidney noted, cannot be characterized as a cyst, ultrasound follow-up is recommended.    Borderline ectasia/aneurysmal dilatation of the abdominal aorta, follow-up is recommended.    Perivesicular haziness, correlation for UTI/cystitis is needed.    Additional findings as above.      Electronically signed by: Andrey " Sang  Date:    09/25/2024  Time:    01:02               Narrative:    EXAMINATION:  CT ABDOMEN PELVIS WITH IV CONTRAST    CLINICAL HISTORY:  Generalized abdominal pain;    TECHNIQUE:  Low dose axial images, sagittal and coronal reformations were obtained from the lung bases to the pubic symphysis following the IV administration of 100 mL of Omnipaque 350 oral contrast was not utilized.  Single phase postcontrast CT examination of the abdomen and pelvis is submitted.    COMPARISON:  CT examination of the abdomen and pelvis May 20, 2024    FINDINGS:  The visualized lung bases demonstrate mild atelectatic change.  The stomach demonstrates nonspecific appearance of moderate distention with fluid, air and ingested material.  The gallbladder is surgically absent.  Diminished attenuation of the liver consistent with diffuse fatty infiltrate is noted.  The liver appears prominent.  There is no evidence for peripancreatic inflammatory change.  There is no evidence for acute process of the spleen or adrenal glands.  The left adrenal gland appears small.    There is a hypodensity at the lower pole of the right kidney measuring approximately 1.6 cm, 27 Hounsfield units cannot be characterized as a cyst, ultrasound may be helpful for further evaluation.  Additional tiny renal hypodensities are noted, too small for characterization.  There is no evidence for ureteral calculus or obstructive uropathy bilaterally.    The arterial vascular structures including the aorta demonstrate atherosclerotic change.  There is dilatation of the infrarenal abdominal aorta measuring up to 2.9 cm, borderline ectasia/aneurysmal dilatation, follow-up is recommended.    Mild perivesicular haziness is noted, correlation for UTI/cystitis is needed.  The patient appears status post hysterectomy.    There is no evidence for small bowel obstructive process.  Mild air and stool noted throughout the colon.  There are areas of fat density associated with  the colon that may relate to areas of submucosal fatty infiltrate and or colonic lipomas without obstructive change.  There is no evidence for colonic inflammatory or obstructive change.  The appendix is not identified.  There is no evidence for free intraperitoneal air.    The osseous structures demonstrate chronic and postoperative change, postoperative change of the spine with associated hardware and artifact noted.                                       X-Ray Chest PA And Lateral (Final result)  Result time 09/24/24 15:48:17      Final result by Louis Nj MD (09/24/24 15:48:17)                   Impression:      1. No acute radiographic abnormalities.      Electronically signed by: Louis Nj  Date:    09/24/2024  Time:    15:48               Narrative:    EXAMINATION:  XR CHEST PA AND LATERAL    CLINICAL HISTORY:  Chest Pain;    COMPARISON:  January 2024    FINDINGS:  Heart size is normal.  The mediastinum is unremarkable.  No infiltrates or effusions are identified.  No acute osseous abnormality is demonstrated.  Changes of anterior instrumented fusion are noted in the lower cervical spine.                                       US Abdomen Limited (Final result)  Result time 09/24/24 15:38:52      Final result by Cleveland Figueroa MD (09/24/24 15:38:52)                   Impression:      1.  Background hepatic parenchymal findings suggesting steatosis.    2.  Prior cholecystectomy with no intra/extrahepatic biliary ductal dilation.    3.  Incidental small cyst in the lower pole the right kidney.    .      Electronically signed by: Cleveland Figueroa  Date:    09/24/2024  Time:    15:38               Narrative:    CLINICAL HISTORY:  ([id number] y/o  (1955) F    Upper abdominal pain;    TECHNIQUE:  (A#[id number], exam time 9/24/2024 15:34)    US ABDOMEN LIMITED [id number]    Right upper quadrant ultrasound, images obtained in grayscale and color. Additional Doppler images of the portal vein  were obtained.    COMPARISON:  Ultrasound from 03/19/2020.    FINDINGS:  The LIVER is mildly prominent at 17.1 cm (sagittal right lobe). Hepatic parenchyma has increased echogenicity with poor delineation of the periportal triads. No definite intrahepatic masses are noted. The portal vein is patent with hepatopetal flow .    The BILIARY SYSTEM is normal in caliber; the common hepatic duct measures 5 mm.  The GALLBLADDER is surgically absent.    The PANCREATIC head and body are partially visualized but grossly normal in appearance. The pancreatic duct is not dilated.    The right KIDNEY is normal in size at 10.4 x 5.1 x 4.6 cm and echogenicity/texture.  There is a 16 mm simple cyst in the inferior pole the right kidney.  No stones or hydronephrosis seen. No solid masses are noted RUQ    The AORTA and IVC are partially visualized and unremarkable.                                          IMPRESSION / RECOMMENDATIONS:   Active Problem List with Overview Notes    Diagnosis Date Noted    Solitary pulmonary nodule 05/29/2024    Myofascial pain syndrome of lumbar spine 05/25/2024    Myofascial pain syndrome, cervical 05/25/2024    Impaired functional mobility and activity tolerance 05/22/2024    Major depressive disorder, single episode, moderate 05/10/2024    Aortic atherosclerosis 05/10/2024    Bilateral carpal tunnel syndrome 04/15/2024    Pharyngoesophageal dysphagia 01/03/2024    Hoarseness, persistent 01/03/2024    Chronic bronchitis 01/03/2024    Pain of lumbar facet joint 03/14/2023    Non-refractory chronic migraine without aura 03/14/2023    Lumbar radiculopathy 03/14/2023    Isolated cervical dystonia 03/14/2023    Gout 03/14/2023    Generalized anxiety disorder 03/14/2023    Stage 3b chronic kidney disease 03/14/2023    Hypothyroidism due to acquired atrophy of thyroid 03/24/2022    Sacroiliitis     Chronic cervical pain 09/03/2021    Activity intolerance 09/03/2021    Failed back syndrome of lumbar spine  06/07/2021    Degenerative disc disease, lumbar 03/01/2021    Pancreatic insufficiency 01/04/2021    Abnormal finding on GI tract imaging 11/02/2020    Chronic pancreatitis 07/06/2020    Colon cancer screening 07/06/2020    B12 deficiency 07/06/2020    History of Clostridium difficile infection 07/06/2020    Bradycardia 03/10/2020    Anemia 03/05/2020    Hypomagnesemia 03/05/2020    Status post right knee replacement 02/12/2020    Unilateral primary osteoarthritis, right knee 12/18/2019    CAD (coronary artery disease) 12/18/2019    History of TIA (transient ischemic attack) 12/18/2019    Chronic pain of right knee 12/05/2019    Primary osteoarthritis of right knee 09/05/2019    Degenerative tear of medial meniscus of right knee 09/05/2019    Baker's cyst of knee, right 09/05/2019    Abnormal computerized tomography of biliary tract 07/24/2019    History of pancreatitis 07/24/2019    S/P drug eluting coronary stent placement, 8/2017 06/06/2019    Family history of premature CAD 06/06/2019    Syncope and collapse, onset 2015, about 10 times, one episode noted hypotension at doctor office 9/2017 06/06/2019    Multiple falls, started 9/2018, 5 times usually on standing 06/06/2019    Excessive caffeine intake 06/06/2019    Orthostatic hypotension 06/06/2019    Abdominal obesity 06/06/2019    Aspirin intolerance 06/06/2019    LVH (left ventricular hypertrophy) due to hypertensive disease, with heart failure 06/06/2019    History of cholecystectomy 06/06/2019    Diverticulosis of large intestine without hemorrhage 06/06/2019    Lumbosacral spondylosis 05/27/2019    Contusion of tail of pancreas 05/16/2019    Bile duct abnormality 05/16/2019    Chronic bilateral low back pain without sciatica 03/27/2019    Class 1 obesity due to excess calories with serious comorbidity and body mass index (BMI) of 30.0 to 30.9 in adult, today 29.1 02/15/2019    COPD (chronic obstructive pulmonary disease) 12/23/2013    Nicotine dependence  12/23/2013    Diarrhea, chronic 12/23/2013    CHF, chronic 12/18/2013    HTN (hypertension), onset in her 20s 12/18/2013    Peripheral neuropathy 07/16/2013    Metatarsalgia 06/21/2013    History of malignant neoplasm of skin 06/18/2013    Pseudophakia 05/02/2013    Dry eyes 05/02/2013    Poikiloderma 04/18/2013    Tendinosis 04/16/2013    Hyperlipidemia 02/28/2013    Allergic rhinitis 01/16/2013    Current smoker 10/17/2012    Rotator cuff injury 07/27/2012    MEN (multiple endocrine neoplasia) 05/31/2012    Mild episode of recurrent major depressive disorder 04/25/2012    Fibromyalgia 04/25/2012    GERD (gastroesophageal reflux disease) 04/25/2012    Hypothyroidism 04/25/2012      68-year-old female patient with a history of chronic pancreatitis.  Patient admitted with lower abdominal pain  Baseline diarrhea stable on colestipol and pancreatic enzymes.  CT abdomen and pelvis without acute pathology.  It shows stool throughout the colon itself.  Patient reports she alternates constipation with diarrhea depending on if she takes her pain medicine and colestipol 2 tablets in the morning usually control her diarrhea  Gastric distention seen on CT imaging suspect opiate related gastroparesis.  EGD in a.m.  RIsks, benefits, alternatives discussed in detail regarding upcoming procedures and sedation and possible complications. Some of the more common endoscopic complications include but not limited to immediate or delayed perforation, bleeding, infections, pain, inadvertent injury to surrounding tissue / organs and possible need for surgical evaluation. All questions answered and will proceed with procedure as planned.      Thank you for this consult.    Lillian Abel  9/25/2024  4:38 PM

## 2024-09-25 NOTE — SUBJECTIVE & OBJECTIVE
Interval History: pt reports pancreatitis meds not helping and having ongoing abdominal pain, would like to see a specialist     Review of Systems   Constitutional:  Positive for appetite change.   Respiratory:  Positive for shortness of breath.    Cardiovascular: Negative.    Gastrointestinal:  Positive for abdominal distention, abdominal pain, diarrhea and nausea. Negative for vomiting.   Genitourinary:  Positive for pelvic pain.   Musculoskeletal:  Positive for arthralgias and back pain.   Skin: Negative.    Neurological: Negative.    Psychiatric/Behavioral: Negative.       Objective:     Vital Signs (Most Recent):  Temp: 97.6 °F (36.4 °C) (09/25/24 1110)  Pulse: 63 (09/25/24 1110)  Resp: 20 (09/25/24 1110)  BP: (!) 164/67 (09/25/24 1110)  SpO2: 98 % (09/25/24 1110) Vital Signs (24h Range):  Temp:  [97.6 °F (36.4 °C)-99 °F (37.2 °C)] 97.6 °F (36.4 °C)  Pulse:  [59-80] 63  Resp:  [14-20] 20  SpO2:  [93 %-99 %] 98 %  BP: (125-184)/(60-79) 164/67     Weight: 86.2 kg (190 lb)  Body mass index is 29.76 kg/m².    Intake/Output Summary (Last 24 hours) at 9/25/2024 1406  Last data filed at 9/25/2024 0902  Gross per 24 hour   Intake 1240 ml   Output --   Net 1240 ml         Physical Exam  Constitutional:       General: She is not in acute distress.  HENT:      Head: Normocephalic and atraumatic.      Mouth/Throat:      Mouth: Mucous membranes are moist.      Pharynx: Oropharynx is clear.   Cardiovascular:      Rate and Rhythm: Normal rate and regular rhythm.   Pulmonary:      Effort: Pulmonary effort is normal. No respiratory distress.      Breath sounds: No wheezing.   Abdominal:      General: There is distension.      Tenderness: There is abdominal tenderness. There is no right CVA tenderness, left CVA tenderness, guarding or rebound.      Hernia: A hernia is present.   Musculoskeletal:         General: Swelling and tenderness present.      Cervical back: Normal range of motion and neck supple.   Skin:     General: Skin  is warm and dry.   Neurological:      General: No focal deficit present.      Mental Status: She is alert and oriented to person, place, and time. Mental status is at baseline.   Psychiatric:         Mood and Affect: Mood normal.         Behavior: Behavior normal.             Significant Labs: All pertinent labs within the past 24 hours have been reviewed.  Recent Lab Results         09/25/24  0542   09/24/24  1508        Albumin 3.9   4.1       ALP 86   90       ALT 13   12       Anion Gap 6   11       AST 16   17       Baso # 0.05   0.07       Basophil % 0.5   0.8       BILIRUBIN TOTAL 0.3  Comment: For infants and newborns, interpretation of results should be based  on gestational age, weight and in agreement with clinical  observations.    Premature Infant recommended reference ranges:  Up to 24 hours.............<8.0 mg/dL  Up to 48 hours............<12.0 mg/dL  3-5 days..................<15.0 mg/dL  6-29 days.................<15.0 mg/dL     0.3  Comment: For infants and newborns, interpretation of results should be based  on gestational age, weight and in agreement with clinical  observations.    Premature Infant recommended reference ranges:  Up to 24 hours.............<8.0 mg/dL  Up to 48 hours............<12.0 mg/dL  3-5 days..................<15.0 mg/dL  6-29 days.................<15.0 mg/dL         BNP   112  Comment: Values of less than 100 pg/ml are consistent with non-CHF populations.       BUN 18   16       Calcium 9.9   10.1       Chloride 106   108       CO2 27   24       Creatinine 0.9   0.9       Differential Method Automated   Automated       eGFR >60.0   >60.0       Eos # 0.2   0.2       Eos % 2.0   2.5       Glucose 103   156       Gran # (ANC) 5.3   5.9       Gran % 57.4   64.6       Hematocrit 33.6   35.5       Hemoglobin 10.5   10.9       Immature Grans (Abs) 0.04  Comment: Mild elevation in immature granulocytes is non specific and   can be seen in a variety of conditions including stress  response,   acute inflammation, trauma and pregnancy. Correlation with other   laboratory and clinical findings is essential.     0.05  Comment: Mild elevation in immature granulocytes is non specific and   can be seen in a variety of conditions including stress response,   acute inflammation, trauma and pregnancy. Correlation with other   laboratory and clinical findings is essential.         Immature Granulocytes 0.4   0.5       Lipase   10       Lymph # 3.0   2.2       Lymph % 32.1   24.4       Magnesium  1.3         MCH 30.0   29.4       MCHC 31.3   30.7       MCV 96   96       Mono # 0.7   0.7       Mono % 7.6   7.2       MPV 8.5   9.0       nRBC 0   0       Platelet Count 245   261       Potassium 3.8   4.0       PROTEIN TOTAL 6.3   6.8       RBC 3.50   3.71       RDW 14.8   14.6       Sodium 139   143       Troponin I High Sensitivity   6.8  Comment: Troponin results differ between methods. Do not use   results between Troponin methods interchangeably.    Alkaline Phospatase levels above 400 U/L may   cause false positive results.    Access hsTnI should not be used for patients taking   Asfotase whitley (Strensiq).         WBC 9.22   9.13               Significant Imaging: I have reviewed all pertinent imaging results/findings within the past 24 hours.

## 2024-09-25 NOTE — ASSESSMENT & PLAN NOTE
On cholestyramine and Creon - she reports not helpful  Abdominal pain increased over past two days  On clear liquids  Pain control  IVF  GI consulted  Imaging did not suggest acute findings of the pancreas

## 2024-09-25 NOTE — PLAN OF CARE
Inpatient Upgrade Note    Alessia Nelson has warranted treatment spanning two or more midnights of hospital level care for the management of abdominal pain. She continues to require IV fluids, daily labs, monitoring of vital signs, and IV pain medication. Her condition is also complicated by the following comorbidities: Coronary Artery Disease, Hypertension, Chronic respiratory disease, and Heart failure.    Nory Enciso MD  [facility]

## 2024-09-25 NOTE — PLAN OF CARE
09/25/24 1608   KEMP Message   Medicare Outpatient and Observation Notification regarding financial responsibility Given to patient/caregiver;Explained to patient/caregiver;Signed/date by patient/caregiver   Date KEMP was signed 09/25/24   Time KEMP was signed 1119     Kemp Form Signed by Pt.

## 2024-09-25 NOTE — ASSESSMENT & PLAN NOTE
Patient has Abnormal Magnesium: hypomagnesemia. Will continue to monitor electrolytes closely. Will replace the affected electrolytes and repeat labs to be done after interventions completed. The patient's magnesium results have been reviewed and are listed below.  Recent Labs   Lab 09/25/24  0542   MG 1.3*    Mg SO4 2gm IV

## 2024-09-25 NOTE — PLAN OF CARE
Formerly [facility], [facility] - Emergency Dept  Initial Discharge Assessment       Primary Care Provider: Luana Calvert MD    Admission Diagnosis: Chronic pancreatitis, unspecified pancreatitis type [K86.1]    Admission Date: 9/24/2024  Expected Discharge Date:    met with Pt at bedside to complete discharge assessment. Pt AAOx4s. Demographics, PCP, and insurance verified. [phone]  .No dialysis. Pt reports ability to complete ADLs without assistance. Pt verbalized plan to discharge home via friend Anjali Pagan [phone]. Pt has no other needs to be addressed at this time.      SW provided info on [employer] for discount prices for meds.    Transition of Care Barriers: None    Payor: MEDICARE / Plan: MEDICARE PART A & B / Product Type: Government /     Extended Emergency Contact Information  Primary Emergency Contact: NATA PAGAN  Home Phone: [phone]  Mobile Phone: [phone]  Relation: Neighbor  Preferred language: English   needed? No  Secondary Emergency Contact: Joshua Pagan  Home Phone: [phone]  Mobile Phone: [phone]  Relation: Neighbor  Preferred language: English   needed? No    Discharge Plan A: Home  Discharge Plan B: [facility]      [id number] [place] [address] [address]  [address] [address]  Phone: [phone] Fax: [phone]    [facility] - [address]  [address] [facility] [address]  Phone: [phone] Fax: [phone]      Initial Assessment (most recent)       Adult Discharge Assessment - 09/25/24 1516          Discharge Assessment    Assessment Type Discharge Planning Assessment     Confirmed/corrected address, phone number and insurance Yes     Confirmed Demographics Correct on Facesheet     Source of Information patient     When was your last doctors appointment? --   Dorothy BARONE    Does  patient/caregiver understand observation status Yes     Communicated JOSE ANTONIO with patient/caregiver Date not available/Unable to determine     Reason For Admission Paplatations     People in Home alone     Facility Arrived From: home     Do you expect to return to your current living situation? Yes     Do you have help at home or someone to help you manage your care at home? Yes     Who are your caregiver(s) and their phone number(s)? [facility] [phone]     Prior to hospitilization cognitive status: Alert/Oriented     Current cognitive status: Alert/Oriented     Walking or Climbing Stairs Difficulty yes     Mobility Management pt uses rails to climb stairs     Dressing/Bathing Difficulty no     Number of Stairs, Main Entrance three;none     Stair Railings, Main Entrance railings safe and in good condition     Home Layout Able to live on 1st floor     Equipment Currently Used at Home none     Readmission within 30 days? No     Patient currently being followed by outpatient case management? Yes     If yes, name of outpatient case management following: other (comments)   [facility]    Do you currently have service(s) that help you manage your care at home? Yes     How Many hours does patient receive services 4     Name and Contact number of agency [phone]     Is the pt/caregiver preference to resume services with current agency Yes     Do you take prescription medications? Yes     Do you have prescription coverage? Yes     Do you have any problems affording any of your prescribed medications? Yes     If yes, what medications? Zenpip and inhaler goodrx was mentioned to Pt     Is the patient taking medications as prescribed? no     If no, which medications is patient not taking? zenpip she takes one a day instead of three pills a day.     Who is going to help you get home at discharge? [employer] [phone]     How do you get to doctors appointments? health plan transportation     Are you on dialysis?  No     Do you take coumadin? No     Discharge Plan A Home     Discharge Plan B Home Health     DME Needed Upon Discharge  none     Transition of Care Barriers None        Physical Activity    On average, how many days per week do you engage in moderate to strenuous exercise (like a brisk walk)? 0 days     On average, how many minutes do you engage in exercise at this level? 0 min        Financial Resource Strain    How hard is it for you to pay for the very basics like food, housing, medical care, and heating? Somewhat hard        Housing Stability    In the last 12 months, was there a time when you were not able to pay the mortgage or rent on time? No     At any time in the past 12 months, were you homeless or living in a shelter (including now)? No        Transportation Needs    Has the lack of transportation kept you from medical appointments, meetings, work or from getting things needed for daily living? No        Food Insecurity    Within the past 12 months, you worried that your food would run out before you got the money to buy more. Never true     Within the past 12 months, the food you bought just didn't last and you didn't have money to get more. Never true        Stress    Do you feel stress - tense, restless, nervous, or anxious, or unable to sleep at night because your mind is troubled all the time - these days? Rather much        Social Isolation    How often do you feel lonely or isolated from those around you?  Never        Alcohol Use    Q1: How often do you have a drink containing alcohol? Never     Q2: How many drinks containing alcohol do you have on a typical day when you are drinking? Patient does not drink     Q3: How often do you have six or more drinks on one occasion? Never        Utilities    In the past 12 months has the electric, gas, oil, or water company threatened to shut off services in your home? No        Health Literacy    How often do you need to have someone help you when you  read instructions, pamphlets, or other written material from your doctor or pharmacy? Never

## 2024-09-25 NOTE — SUBJECTIVE & OBJECTIVE
Past Medical History:   Diagnosis Date    Acute pancreatitis     Back pain     CAD (coronary artery disease)     CHF (congestive heart failure)     COPD (chronic obstructive pulmonary disease)     Depression     Diverticulosis     Encounter for blood transfusion     GERD (gastroesophageal reflux disease)     History of blood clots     1986 AFTER BOWEL RESCETION    History of bowel resection     Hyperlipidemia     Hypertension     Peritonitis     Pulmonary nodule     Seizures     HAD A SEIZURE FROM PHENERGAN     Stroke     Thyroid disease     TIA (transient ischemic attack)     Wears dentures     upper       Past Surgical History:   Procedure Laterality Date    ADRENAL GLAND SURGERY      APPENDECTOMY      BACK SURGERY      CAUDAL EPIDURAL STEROID INJECTION N/A 3/1/2021    Procedure: Injection-steroid-epidural-caudal;  Surgeon: Socorro Lorenz MD;  Location: [facility] OR;  Service: Pain Management;  Laterality: N/A;    CHOLECYSTECTOMY      COLONOSCOPY      COLONOSCOPY N/A 6/10/2021    Procedure: COLONOSCOPY;  Surgeon: Sheree Metz MD;  Location: [facility] ENDO;  Service: Endoscopy;  Laterality: N/A;    COLONOSCOPY N/A 3/8/2022    Procedure: COLONOSCOPY;  Surgeon: Maurilio Rodriguez MD;  Location: [facility] ENDO ([address] [place] [address]);  Service: Endoscopy;  Laterality: N/A;  Pt to perform at-home COVID test morning of procedure-DS  2/24-Blood thinner approval rec[facility] Dr. Walsh (see letters tab 2/24/22)-DS  3/2-Instructions sent via email-DS  3/7-Pt unable to come earlier due to ride-DS    CORONARY STENT PLACEMENT      CYSTOURETHROSCOPY N/A 11/13/2019    Procedure: CYSTOURETHROSCOPY;  Surgeon: Shun Nuñez MD;  Location: [facility] OR;  Service: Urology;  Laterality: N/A;    DIRECT DIAGNOSTIC LARYNGOSCOPY WITH BRONCHOSCOPY AND ESOPHAGOSCOPY N/A 1/3/2024    Procedure: LARYNGOSCOPY, DIRECT, DIAGNOSTIC, WITH BRONCHOSCOPY AND ESOPHAGOSCOPY;  Surgeon: Mir Belle MD;  Location: [facility] OR;  Service: ENT;  Laterality: N/A;    ENDOSCOPIC  ULTRASOUND OF LOWER GASTROINTESTINAL TRACT N/A 3/8/2022    Procedure: ULTRASOUND, LOWER GI TRACT, ENDOSCOPIC;  Surgeon: Maurilio Rodriguez MD;  Location: [facility] (2ND FLR);  Service: Endoscopy;  Laterality: N/A;  Pt to perform at-home COVID test morning of procedure-DS  [facility]DS  3/2-Instructions sent via email-DS  3/7-Pt unable to come earlier due to ride-DS    ENDOSCOPIC ULTRASOUND OF UPPER GASTROINTESTINAL TRACT N/A 11/25/2019    Procedure: ULTRASOUND, UPPER GI TRACT, ENDOSCOPIC;  Surgeon: Alhaji Bridges MD;  Location: [facility] ENDO (2ND FLR);  Service: Endoscopy;  Laterality: N/A;  5 day hold Plavix, Dr Jovanni Serrano - pg  PM prep    ENDOSCOPIC ULTRASOUND OF UPPER GASTROINTESTINAL TRACT N/A 11/2/2020    Procedure: ULTRASOUND, UPPER GI TRACT, ENDOSCOPIC;  Surgeon: Maurilio Rodriguez MD;  Location: [facility] ENDO (2ND FLR);  Service: Endoscopy;  Laterality: N/A;  5 day hold Plavix, Dr Roman Walsh - pg  Covid-19 test 10/30/20 at [facility] [place]    EPIDURAL STEROID INJECTION INTO CERVICAL SPINE N/A 12/8/2021    Procedure: Injection-steroid-epidural-cervical;  Surgeon: Socorro Lorenz MD;  Location: [facility] OR;  Service: Pain Management;  Laterality: N/A;    EPIDURAL STEROID INJECTION INTO CERVICAL SPINE N/A 11/30/2023    Procedure: Injection-steroid-epidural-cervical;  Surgeon: Maurilio Carroll MD;  Location: [facility] OR;  Service: Anesthesiology;  Laterality: N/A;  c7-T1    ESOPHAGOGASTRODUODENOSCOPY N/A 6/10/2021    Procedure: EGD (ESOPHAGOGASTRODUODENOSCOPY);  Surgeon: Sheree Metz MD;  Location: [facility] ENDO;  Service: Endoscopy;  Laterality: N/A;    ESOPHAGOSCOPY, USING BOUGIE, WITH DILATION N/A 1/3/2024    Procedure: ESOPHAGOSCOPY, USING BOUGIE, WITH DILATION;  Surgeon: iMr Belle MD;  Location: [facility] OR;  Service: ENT;  Laterality: N/A;    FRACTURE SURGERY Left 05/02/2022    ankle    HERNIA REPAIR      HYSTERECTOMY      INCISIONAL HERNIA REPAIR       "INJECTION OF ANESTHETIC AGENT AROUND NERVE Right 5/27/2019    Procedure: RIGHT L5-S3 MEDIAL BRANCH BLOCKS;  Surgeon: Sneha Aragon MD;  Location: [facility] OR;  Service: Pain Management;  Laterality: Right;  **DO NOT STOP PLAVIX**    INJECTION OF JOINT Right 9/22/2021    Procedure: Injection, Joint; Right SI Joint Injection;  Surgeon: Socorro Lorenz MD;  Location: [facility] OR;  Service: Pain Management;  Laterality: Right;  Oral    INSERTION OF DORSAL COLUMN NERVE STIMULATOR FOR TRIAL N/A 6/7/2021    Procedure: INSERTION, NEUROSTIMULATOR, SPINAL CORD, DORSAL COLUMN, FOR TRIAL;  Surgeon: Socorro Lorenz MD;  Location: [facility] OR;  Service: Pain Management;  Laterality: N/A;  nevro rep confirmed start time    instestine      bowel section    KNEE ARTHROPLASTY Right 12/18/2019    Procedure: ARTHROPLASTY, KNEE;  Surgeon: Ike Briceño II, MD;  Location: [facility] OR;  Service: Orthopedics;  Laterality: Right;    KNEE SURGERY  1983    OOPHORECTOMY      PARATHYROID GLAND SURGERY      3 surgeries    RADIOFREQUENCY ABLATION Right 6/10/2019    Procedure: Radiofrequency Ablation - RIGHT L3-5 RADIOFREQUENCY ABLATION WITH HALYARD COOLIEF THERMAL SYSTEM;  Surgeon: Sneha Aragon MD;  Location: [facility] OR;  Service: Pain Management;  Laterality: Right;  **HOLD PLAVIX x 7 DAYS PRIOR**    SPINAL CORD STIMULATOR REMOVAL N/A 2/21/2024    Procedure: REMOVAL, NEUROSTIMULATOR, SPINAL;  Surgeon: Maurilio Carroll MD;  Location: [facility] OR;  Service: Pain Management;  Laterality: N/A;  removal of spinal cord stimulator    UPPER GASTROINTESTINAL ENDOSCOPY         Review of patient's allergies indicates:   Allergen Reactions    Aspirin Other (See Comments)     "Makes stomach feel like it's on fire"    Phenergan [promethazine] Other (See Comments)     SEIZURES    Lortab [hydrocodone-acetaminophen] Itching     Able to take generic Norco       Current Facility-Administered Medications on File Prior to Encounter   Medication    electrolyte-S (ISOLYTE)    " fentaNYL 50 mcg/mL injection 25 mcg    HYDROmorphone injection 0.2 mg    lactated ringers infusion    lactated ringers infusion    lactated ringers infusion    lactated ringers infusion    lactated ringers infusion    LIDOcaine (PF) 10 mg/ml (1%) injection 10 mg    LIDOcaine (PF) 10 mg/ml (1%) injection 10 mg    lorazepam injection 0.25 mg    oxyCODONE immediate release tablet 5 mg    prochlorperazine injection Soln 5 mg    sodium chloride 0.9% flush 3 mL     Current Outpatient Medications on File Prior to Encounter   Medication Sig    acetaminophen (TYLENOL) 325 MG tablet Take 2 tablets (650 mg total) by mouth every 6 (six) hours as needed for Pain (Do not take with any other tylenol containing products).    allopurinoL (ZYLOPRIM) 300 MG tablet TAKE 1 TABLET DAILY    AREXVY, PF, 120 mcg/0.5 mL SusR vaccine     azelastine (ASTELIN) 137 mcg (0.1 %) nasal spray 2 sprays (274 mcg total) by Nasal route 2 (two) times daily.    busPIRone (BUSPAR) 5 MG Tab Take 1 tablet (5 mg total) by mouth 2 (two) times daily.    calcium citrate-vitamin D3 315-200 mg (CITRACAL+D) 315 mg-5 mcg (200 unit) per tablet Take 1 tablet by mouth once daily.    clopidogreL (PLAVIX) 75 mg tablet Take 1 tablet (75 mg total) by mouth once daily.    colchicine (COLCRYS) 0.6 mg tablet Take 2 tablets (1.2 mg) by mouth now, in 1 hour take 1 tablet (0.6 mg). Repeat dose in 3 days (Patient not taking: Reported on 9/4/2024)    colestipoL (COLESTID) 1 gram Tab Take 2 tablets (2 g total) by mouth once daily.    cyanocobalamin 1,000 mcg/mL injection INJECT 1 ML IN THE MUSCLE EVERY 14 DAYS    EScitalopram oxalate (LEXAPRO) 20 MG tablet TAKE 1 TABLET EVERY EVENING    fluticasone propionate (FLONASE) 50 mcg/actuation nasal spray 2 sprays (100 mcg total) by Each Nostril route 2 (two) times daily.    furosemide (LASIX) 20 MG tablet TAKE 1 TABLET(20 MG) BY MOUTH EVERY DAY (Patient taking differently: Take 20 mg by mouth as needed.)    gabapentin (NEURONTIN) 400 MG  capsule Take 1 capsule (400 mg total) by mouth 2 (two) times daily.    HYDROcodone-acetaminophen (NORCO)  mg per tablet Take 1 tablet by mouth every 8 (eight) hours as needed.    levothyroxine (SYNTHROID) 75 MCG tablet TAKE 1 TABLET DAILY    magnesium oxide (MAG-OX) 400 mg (241.3 mg magnesium) tablet Take 1 tablet (400 mg total) by mouth once daily.    methocarbamoL (ROBAXIN) 750 MG Tab TAKE 1 TABLET EVERY 8 HOURS AS NEEDED FOR MUSCLE SPASMS    metoprolol succinate (TOPROL-XL) 50 MG 24 hr tablet Take 1 tablet (50 mg total) by mouth once daily. Pt takes 1/2 tab daily    mupirocin (BACTROBAN) 2 % ointment Apply topically 3 (three) times daily.    ondansetron (ZOFRAN-ODT) 4 MG TbDL Take 1 tablet (4 mg total) by mouth every 8 (eight) hours as needed (nausea).    pantoprazole (PROTONIX) 40 MG tablet TAKE 1 TABLET DAILY    potassium chloride SA (KLOR-CON M20) 20 MEQ tablet Take 1 tablet (20 mEq total) by mouth once daily.    rosuvastatin (CRESTOR) 10 MG tablet Take 1 tablet (10 mg total) by mouth once daily.    SHINGRIX, PF, 50 mcg/0.5 mL injection Inject 0.5 mLs into the muscle once.    traZODone (DESYREL) 100 MG tablet TAKE 1 TABLET EVERY EVENING    ZENPEP 40,000-126,000- 168,000 unit CpDR Take by mouth.     Family History       Problem Relation (Age of Onset)    Breast cancer Sister    Cancer Maternal Grandfather    Heart disease Father    Stroke Maternal Grandmother, Mother          Tobacco Use    Smoking status: Former     Current packs/day: 0.00     Average packs/day: 1 pack/day for 46.0 years (46.0 ttl pk-yrs)     Types: Cigarettes     Start date: 1977     Quit date: 2023     Years since quittin.7    Smokeless tobacco: Never   Substance and Sexual Activity    Alcohol use: Not Currently    Drug use: No    Sexual activity: Not Currently     Review of Systems      Constitutional:  Negative for fever, chills, generalized weakness, appetite change  HENT:  Negative for congestion, sore throat  Eyes:   Negative for redness, discharge  Respiratory:  Negative for cough and shortness of breath  Cardiovascular:  Negative for chest pain, palpitation  Gastrointestinal:  Positive for abdominal pain, nausea  Genitourinary:  Negative for flank pain, difficulty urination  Musculoskeletal:  Negative for arthralgia, myalgia  Skin:  Negative for color change  Neuro:  Negative for dizziness, focal weakness  Psychiatric:  Negative for agitation, confusion    Objective:     Vital Signs (Most Recent):  Temp: 99 °F (37.2 °C) (09/24/24 1438)  Pulse: 61 (09/24/24 2203)  Resp: 16 (09/24/24 2200)  BP: (!) 143/67 (09/24/24 2203)  SpO2: 96 % (09/24/24 2203) Vital Signs (24h Range):  Temp:  [99 °F (37.2 °C)] 99 °F (37.2 °C)  Pulse:  [61-80] 61  Resp:  [16-19] 16  SpO2:  [93 %-96 %] 96 %  BP: (143-151)/(67-75) 143/67     Weight: 86.2 kg (190 lb)  Body mass index is 29.76 kg/m².     Physical Exam          General:  Awake, alert, not in apparent distress  Head:  NC/AT  HEENT:  PERRLA, EOMI, no pallor or icterus               Oral mucosa moist without exudates or erythema  Neck:  Supple, no JVD, no lymphadenopathy  Chest:  S1-S2 normal, no M/G/R  Respiratory:  Normal vesicular breath sounds with no added sounds  Abdomen:  Soft, nondistended, mid abdominal tenderness, no rebound tenderness or rigidity, no organomegaly, bowel sounds present  Extremities:  No pitting edema of bilateral lower extremities present  Neuro:  Awake, alert, oriented x3, grossly intact motor and sensory exam  Psychiatric:  Normal mood and affect     Significant Labs: All pertinent labs within the past 24 hours have been reviewed.  Recent Lab Results         09/24/24  1508        Albumin 4.1       ALP 90       ALT 12       Anion Gap 11       AST 17       Baso # 0.07       Basophil % 0.8       BILIRUBIN TOTAL 0.3  Comment: For infants and newborns, interpretation of results should be based  on gestational age, weight and in agreement with  clinical  observations.    Premature Infant recommended reference ranges:  Up to 24 hours.............<8.0 mg/dL  Up to 48 hours............<12.0 mg/dL  3-5 days..................<15.0 mg/dL  6-29 days.................<15.0 mg/dL           Comment: Values of less than 100 pg/ml are consistent with non-CHF populations.       BUN 16       Calcium 10.1       Chloride 108       CO2 24       Creatinine 0.9       Differential Method Automated       eGFR >60.0       Eos # 0.2       Eos % 2.5       Glucose 156       Gran # (ANC) 5.9       Gran % 64.6       Hematocrit 35.5       Hemoglobin 10.9       Immature Grans (Abs) 0.05  Comment: Mild elevation in immature granulocytes is non specific and   can be seen in a variety of conditions including stress response,   acute inflammation, trauma and pregnancy. Correlation with other   laboratory and clinical findings is essential.         Immature Granulocytes 0.5       Lipase 10       Lymph # 2.2       Lymph % 24.4       MCH 29.4       MCHC 30.7       MCV 96       Mono # 0.7       Mono % 7.2       MPV 9.0       nRBC 0       Platelet Count 261       Potassium 4.0       PROTEIN TOTAL 6.8       RBC 3.71       RDW 14.6       Sodium 143       Troponin I High Sensitivity 6.8  Comment: Troponin results differ between methods. Do not use   results between Troponin methods interchangeably.    Alkaline Phospatase levels above 400 U/L may   cause false positive results.    Access hsTnI should not be used for patients taking   Asfotase whitley (Strensiq).         WBC 9.13               Significant Imaging: I have reviewed all pertinent imaging results/findings within the past 24 hours.  I have reviewed and interpreted all pertinent imaging results/findings within the past 24 hours.

## 2024-09-25 NOTE — ED NOTES
Pt upset about not having a bed and being in pain. 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 Reassured pt that a bed has been requested and offered pain medication. Tolerated well. NAD noted. VSS. Pt also requested her overnight bag from her vehicle which the nurse retrieved.

## 2024-09-25 NOTE — HOSPITAL COURSE
Patient admitted with abdominal pain and ongoing diarrhea despite stool bulking agent and Creon. She feels like this is another pancreatitis attack like previous ones. CT imaging did not show acute findings regarding the pancreas. Possible cystitis may explain the lower abdominal pain and started on Rocephin. UA pending. Giovana zhao has chronic pain from an ortho standpoint with right knee replacement and thoracic and lumbar spine disease for which she goes to [facility] orth[facility].   Today, pain reported 10/10 despite medications given. GI consulted.  Endoscopy planned for 9/26.  Patient complained of multiple episodes of diarrhea, with being on Augmentin for 10 days recently, she also mentioned they were foul smelling, C diff testing was ordered but her stool was semi formed and lab canceled.   For gaseous distention, simethicone was ordered in addition to dicyclomine for possible IBS.  Follow up with GI for further recommendations who recommended colonoscopy can be done as outpatient.  Added amlodipine in addition to metoprolol for hypertension.  Also on p.r.n. hydralazine.  Blood pressure well controlled.  Her diarrhea eventually improved.  Patient to follow up as outpatient with GI for a repeat colonoscopy.  She demonstrated and verbalized understanding of all the instructions.

## 2024-09-25 NOTE — H&P
[facility] Emergency Dept  Hospital Medicine  History & Physical    Patient Name: Alessia Nelson  MRN: [id number]  Patient Class: OP- Observation  Admission Date: 9/24/2024  Attending Physician: Elie Herrera MD   Primary Care Provider: Luana Calvert MD         Patient information was obtained from patient and ER records.     Subjective:     Principal Problem:<principal problem not specified>    Chief Complaint:   Chief Complaint   Patient presents with    Palpitations     Complains of palpations since Saturday.  States that she feels like her heart is skipping a beat.  Denies chest pain    Abdominal Pain     States has a hx of chronic pancreatitis abdominal pain since Saturday.          HPI: The patient is a 68-year-old female with past medical history of CKD stage 3b, CAD, recurrent C diff, chronic pancreatitis, pancreatic insufficiency, chronic diarrhea, status post right hemicolectomy due to complication from abdominal surgery, GERD, hypertension, hyperlipidemia, COPD who presented to the ED  for the evaluation of abdominal pain.    The patient reports that she started having abdominal pain since Saturday.  She reports that she started having pain in the lower part of the abdomen at 1st.  She also reports that she started getting pain in the back which slowly progressed to the front of the abdomen.  She complains of nausea but no vomiting. She reports that she has not passed flatus today but had bowel movement this morning.  She reports that she has history of chronic pancreatitis.  She reports that she has chronic diarrhea and is on colestipol and pancreatic enzyme for that.  She denies any fever, chills, cough, chest pain, shortness of breath.  She complains of bilateral swelling of lower extremities.  She denies any orthopnea, PND.  She reports that she has mild COPD and quit smoking about 2 years ago.      Past Medical History:   Diagnosis Date    Acute pancreatitis     Back  pain     CAD (coronary artery disease)     CHF (congestive heart failure)     COPD (chronic obstructive pulmonary disease)     Depression     Diverticulosis     Encounter for blood transfusion     GERD (gastroesophageal reflux disease)     History of blood clots     1986 AFTER BOWEL RESCETION    History of bowel resection     Hyperlipidemia     Hypertension     Peritonitis     Pulmonary nodule     Seizures     HAD A SEIZURE FROM PHENERGAN     Stroke     Thyroid disease     TIA (transient ischemic attack)     Wears dentures     upper       Past Surgical History:   Procedure Laterality Date    ADRENAL GLAND SURGERY      APPENDECTOMY      BACK SURGERY      CAUDAL EPIDURAL STEROID INJECTION N/A 3/1/2021    Procedure: Injection-steroid-epidural-caudal;  Surgeon: Socorro Lorenz MD;  Location: [facility] OR;  Service: Pain Management;  Laterality: N/A;    CHOLECYSTECTOMY      COLONOSCOPY      COLONOSCOPY N/A 6/10/2021    Procedure: COLONOSCOPY;  Surgeon: Sheree Metz MD;  Location: [facility];  Service: Endoscopy;  Laterality: N/A;    COLONOSCOPY N/A 3/8/2022    Procedure: COLONOSCOPY;  Surgeon: Maurilio Rodriguez MD;  Location: [facility] ([address] [place] [address]);  Service: Endoscopy;  Laterality: N/A;  Pt to perform at-home COVID test morning of procedure-DS  2/24-Blood thinner approval rec'[facility] Dr. Walsh (see letters tab 2/24/22)-DS  3/2-Instructions sent via email-DS  3/7-Pt unable to come earlier due to ride-DS    CORONARY STENT PLACEMENT      CYSTOURETHROSCOPY N/A 11/13/2019    Procedure: CYSTOURETHROSCOPY;  Surgeon: Shun Nuñez MD;  Location: [facility] OR;  Service: Urology;  Laterality: N/A;    DIRECT DIAGNOSTIC LARYNGOSCOPY WITH BRONCHOSCOPY AND ESOPHAGOSCOPY N/A 1/3/2024    Procedure: LARYNGOSCOPY, DIRECT, DIAGNOSTIC, WITH BRONCHOSCOPY AND ESOPHAGOSCOPY;  Surgeon: Mir Belle MD;  Location: [facility] OR;  Service: ENT;  Laterality: N/A;    ENDOSCOPIC ULTRASOUND OF LOWER GASTROINTESTINAL TRACT N/A 3/8/2022    Procedure:  ULTRASOUND, LOWER GI TRACT, ENDOSCOPIC;  Surgeon: Maurilio Rodriguez MD;  Location: [facility] (2ND FLR);  Service: Endoscopy;  Laterality: N/A;  Pt to perform at-home COVID test morning of procedure-DS  [facility]DS  3/2-Instructions sent via email-DS  3/7-Pt unable to come earlier due to ride-DS    ENDOSCOPIC ULTRASOUND OF UPPER GASTROINTESTINAL TRACT N/A 11/25/2019    Procedure: ULTRASOUND, UPPER GI TRACT, ENDOSCOPIC;  Surgeon: Alhaji Bridges MD;  Location: [facility] ENDO (2ND FLR);  Service: Endoscopy;  Laterality: N/A;  5 day hold Plavix, Dr Jovanni Serrano - pg  PM prep    ENDOSCOPIC ULTRASOUND OF UPPER GASTROINTESTINAL TRACT N/A 11/2/2020    Procedure: ULTRASOUND, UPPER GI TRACT, ENDOSCOPIC;  Surgeon: Maurilio Rodriguez MD;  Location: [facility] ENDO (2ND FLR);  Service: Endoscopy;  Laterality: N/A;  5 day hold Plavix, Dr Roman Walsh - pg  Covid-19 test 10/30/20 at [facility]    EPIDURAL STEROID INJECTION INTO CERVICAL SPINE N/A 12/8/2021    Procedure: Injection-steroid-epidural-cervical;  Surgeon: Socorro Lorenz MD;  Location: [facility] OR;  Service: Pain Management;  Laterality: N/A;    EPIDURAL STEROID INJECTION INTO CERVICAL SPINE N/A 11/30/2023    Procedure: Injection-steroid-epidural-cervical;  Surgeon: Maurilio Carroll MD;  Location: [facility] OR;  Service: Anesthesiology;  Laterality: N/A;  c7-T1    ESOPHAGOGASTRODUODENOSCOPY N/A 6/10/2021    Procedure: EGD (ESOPHAGOGASTRODUODENOSCOPY);  Surgeon: Sheree Metz MD;  Location: [facility] ENDO;  Service: Endoscopy;  Laterality: N/A;    ESOPHAGOSCOPY, USING BOUGIE, WITH DILATION N/A 1/3/2024    Procedure: ESOPHAGOSCOPY, USING BOUGIE, WITH DILATION;  Surgeon: Mir Belle MD;  Location: [facility] OR;  Service: ENT;  Laterality: N/A;    FRACTURE SURGERY Left 05/02/2022    ankle    HERNIA REPAIR      HYSTERECTOMY      INCISIONAL HERNIA REPAIR      INJECTION OF ANESTHETIC AGENT AROUND NERVE Right 5/27/2019    Procedure:  "RIGHT L5-S3 MEDIAL BRANCH BLOCKS;  Surgeon: Sneha Aragon MD;  Location: [facility] OR;  Service: Pain Management;  Laterality: Right;  **DO NOT STOP PLAVIX**    INJECTION OF JOINT Right 9/22/2021    Procedure: Injection, Joint; Right SI Joint Injection;  Surgeon: Socorro Lorenz MD;  Location: [facility] OR;  Service: Pain Management;  Laterality: Right;  Oral    INSERTION OF DORSAL COLUMN NERVE STIMULATOR FOR TRIAL N/A 6/7/2021    Procedure: INSERTION, NEUROSTIMULATOR, SPINAL CORD, DORSAL COLUMN, FOR TRIAL;  Surgeon: Socorro Lorenz MD;  Location: [facility] OR;  Service: Pain Management;  Laterality: N/A;  nevro rep confirmed start time    instestine      bowel section    KNEE ARTHROPLASTY Right 12/18/2019    Procedure: ARTHROPLASTY, KNEE;  Surgeon: Ike Briceño II, MD;  Location: [facility] OR;  Service: Orthopedics;  Laterality: Right;    KNEE SURGERY  1983    OOPHORECTOMY      PARATHYROID GLAND SURGERY      3 surgeries    RADIOFREQUENCY ABLATION Right 6/10/2019    Procedure: Radiofrequency Ablation - RIGHT L3-5 RADIOFREQUENCY ABLATION WITH [employer] Energy[employer]ARD COOLIEF THERMAL SYSTEM;  Surgeon: Sneha Aragon MD;  Location: [facility] OR;  Service: Pain Management;  Laterality: Right;  **HOLD PLAVIX x 7 DAYS PRIOR**    SPINAL CORD STIMULATOR REMOVAL N/A 2/21/2024    Procedure: REMOVAL, NEUROSTIMULATOR, SPINAL;  Surgeon: Maurilio Carroll MD;  Location: [facility] OR;  Service: Pain Management;  Laterality: N/A;  removal of spinal cord stimulator    UPPER GASTROINTESTINAL ENDOSCOPY         Review of patient's allergies indicates:   Allergen Reactions    Aspirin Other (See Comments)     "Makes stomach feel like it's on fire"    Phenergan [promethazine] Other (See Comments)     SEIZURES    Lortab [hydrocodone-acetaminophen] Itching     Able to take generic Norco       Current Facility-Administered Medications on File Prior to Encounter   Medication    electrolyte-S (ISOLYTE)    fentaNYL 50 mcg/mL injection 25 mcg    HYDROmorphone injection 0.2 mg    " lactated ringers infusion    lactated ringers infusion    lactated ringers infusion    lactated ringers infusion    lactated ringers infusion    LIDOcaine (PF) 10 mg/ml (1%) injection 10 mg    LIDOcaine (PF) 10 mg/ml (1%) injection 10 mg    lorazepam injection 0.25 mg    oxyCODONE immediate release tablet 5 mg    prochlorperazine injection Soln 5 mg    sodium chloride 0.9% flush 3 mL     Current Outpatient Medications on File Prior to Encounter   Medication Sig    acetaminophen (TYLENOL) 325 MG tablet Take 2 tablets (650 mg total) by mouth every 6 (six) hours as needed for Pain (Do not take with any other tylenol containing products).    allopurinoL (ZYLOPRIM) 300 MG tablet TAKE 1 TABLET DAILY    AREXVY, PF, 120 mcg/0.5 mL SusR vaccine     azelastine (ASTELIN) 137 mcg (0.1 %) nasal spray 2 sprays (274 mcg total) by Nasal route 2 (two) times daily.    busPIRone (BUSPAR) 5 MG Tab Take 1 tablet (5 mg total) by mouth 2 (two) times daily.    calcium citrate-vitamin D3 315-200 mg (CITRACAL+D) 315 mg-5 mcg (200 unit) per tablet Take 1 tablet by mouth once daily.    clopidogreL (PLAVIX) 75 mg tablet Take 1 tablet (75 mg total) by mouth once daily.    colchicine (COLCRYS) 0.6 mg tablet Take 2 tablets (1.2 mg) by mouth now, in 1 hour take 1 tablet (0.6 mg). Repeat dose in 3 days (Patient not taking: Reported on 9/4/2024)    colestipoL (COLESTID) 1 gram Tab Take 2 tablets (2 g total) by mouth once daily.    cyanocobalamin 1,000 mcg/mL injection INJECT 1 ML IN THE MUSCLE EVERY 14 DAYS    EScitalopram oxalate (LEXAPRO) 20 MG tablet TAKE 1 TABLET EVERY EVENING    fluticasone propionate (FLONASE) 50 mcg/actuation nasal spray 2 sprays (100 mcg total) by Each Nostril route 2 (two) times daily.    furosemide (LASIX) 20 MG tablet TAKE 1 TABLET(20 MG) BY MOUTH EVERY DAY (Patient taking differently: Take 20 mg by mouth as needed.)    gabapentin (NEURONTIN) 400 MG capsule Take 1 capsule (400 mg total) by mouth 2 (two) times daily.     HYDROcodone-acetaminophen (NORCO)  mg per tablet Take 1 tablet by mouth every 8 (eight) hours as needed.    levothyroxine (SYNTHROID) 75 MCG tablet TAKE 1 TABLET DAILY    magnesium oxide (MAG-OX) 400 mg (241.3 mg magnesium) tablet Take 1 tablet (400 mg total) by mouth once daily.    methocarbamoL (ROBAXIN) 750 MG Tab TAKE 1 TABLET EVERY 8 HOURS AS NEEDED FOR MUSCLE SPASMS    metoprolol succinate (TOPROL-XL) 50 MG 24 hr tablet Take 1 tablet (50 mg total) by mouth once daily. Pt takes 1/2 tab daily    mupirocin (BACTROBAN) 2 % ointment Apply topically 3 (three) times daily.    ondansetron (ZOFRAN-ODT) 4 MG TbDL Take 1 tablet (4 mg total) by mouth every 8 (eight) hours as needed (nausea).    pantoprazole (PROTONIX) 40 MG tablet TAKE 1 TABLET DAILY    potassium chloride SA (KLOR-CON M20) 20 MEQ tablet Take 1 tablet (20 mEq total) by mouth once daily.    rosuvastatin (CRESTOR) 10 MG tablet Take 1 tablet (10 mg total) by mouth once daily.    SHINGRIX, PF, 50 mcg/0.5 mL injection Inject 0.5 mLs into the muscle once.    traZODone (DESYREL) 100 MG tablet TAKE 1 TABLET EVERY EVENING    ZENPEP 40,000-126,000- 168,000 unit CpDR Take by mouth.     Family History       Problem Relation (Age of Onset)    Breast cancer Sister    Cancer Maternal Grandfather    Heart disease Father    Stroke Maternal Grandmother, Mother          Tobacco Use    Smoking status: Former     Current packs/day: 0.00     Average packs/day: 1 pack/day for 46.0 years (46.0 ttl pk-yrs)     Types: Cigarettes     Start date: 1977     Quit date: 2023     Years since quittin.7    Smokeless tobacco: Never   Substance and Sexual Activity    Alcohol use: Not Currently    Drug use: No    Sexual activity: Not Currently     Review of Systems      Constitutional:  Negative for fever, chills, generalized weakness, appetite change  HENT:  Negative for congestion, sore throat  Eyes:  Negative for redness, discharge  Respiratory:  Negative for cough and  shortness of breath  Cardiovascular:  Negative for chest pain, palpitation  Gastrointestinal:  Positive for abdominal pain, nausea  Genitourinary:  Negative for flank pain, difficulty urination  Musculoskeletal:  Negative for arthralgia, myalgia  Skin:  Negative for color change  Neuro:  Negative for dizziness, focal weakness  Psychiatric:  Negative for agitation, confusion    Objective:     Vital Signs (Most Recent):  Temp: 99 °F (37.2 °C) (09/24/24 1438)  Pulse: 61 (09/24/24 2203)  Resp: 16 (09/24/24 2200)  BP: (!) 143/67 (09/24/24 2203)  SpO2: 96 % (09/24/24 2203) Vital Signs (24h Range):  Temp:  [99 °F (37.2 °C)] 99 °F (37.2 °C)  Pulse:  [61-80] 61  Resp:  [16-19] 16  SpO2:  [93 %-96 %] 96 %  BP: (143-151)/(67-75) 143/67     Weight: 86.2 kg (190 lb)  Body mass index is 29.76 kg/m².     Physical Exam          General:  Awake, alert, not in apparent distress  Head:  NC/AT  HEENT:  PERRLA, EOMI, no pallor or icterus               Oral mucosa moist without exudates or erythema  Neck:  Supple, no JVD, no lymphadenopathy  Chest:  S1-S2 normal, no M/G/R  Respiratory:  Normal vesicular breath sounds with no added sounds  Abdomen:  Soft, nondistended, mid abdominal tenderness, no rebound tenderness or rigidity, no organomegaly, bowel sounds present  Extremities:  No pitting edema of bilateral lower extremities present  Neuro:  Awake, alert, oriented x3, grossly intact motor and sensory exam  Psychiatric:  Normal mood and affect     Significant Labs: All pertinent labs within the past 24 hours have been reviewed.  Recent Lab Results         09/24/24  1508        Albumin 4.1       ALP 90       ALT 12       Anion Gap 11       AST 17       Baso # 0.07       Basophil % 0.8       BILIRUBIN TOTAL 0.3  Comment: For infants and newborns, interpretation of results should be based  on gestational age, weight and in agreement with clinical  observations.    Premature Infant recommended reference ranges:  Up to 24  hours.............<8.0 mg/dL  Up to 48 hours............<12.0 mg/dL  3-5 days..................<15.0 mg/dL  6-29 days.................<15.0 mg/dL           Comment: Values of less than 100 pg/ml are consistent with non-CHF populations.       BUN 16       Calcium 10.1       Chloride 108       CO2 24       Creatinine 0.9       Differential Method Automated       eGFR >60.0       Eos # 0.2       Eos % 2.5       Glucose 156       Gran # (ANC) 5.9       Gran % 64.6       Hematocrit 35.5       Hemoglobin 10.9       Immature Grans (Abs) 0.05  Comment: Mild elevation in immature granulocytes is non specific and   can be seen in a variety of conditions including stress response,   acute inflammation, trauma and pregnancy. Correlation with other   laboratory and clinical findings is essential.         Immature Granulocytes 0.5       Lipase 10       Lymph # 2.2       Lymph % 24.4       MCH 29.4       MCHC 30.7       MCV 96       Mono # 0.7       Mono % 7.2       MPV 9.0       nRBC 0       Platelet Count 261       Potassium 4.0       PROTEIN TOTAL 6.8       RBC 3.71       RDW 14.6       Sodium 143       Troponin I High Sensitivity 6.8  Comment: Troponin results differ between methods. Do not use   results between Troponin methods interchangeably.    Alkaline Phospatase levels above 400 U/L may   cause false positive results.    Access hsTnI should not be used for patients taking   Asfotase whitley (Strensiq).         WBC 9.13               Significant Imaging: I have reviewed all pertinent imaging results/findings within the past 24 hours.  I have reviewed and interpreted all pertinent imaging results/findings within the past 24 hours.  Assessment/Plan:     # Abdominal pain:  # ? Acute on chronic pancreatitis:  Ultrasound abdomen is unremarkable  Lipase is 10  Upper EUS 11/02/2020:  Pancreatic parenchymal abnormalities consisting of lobularity in the entire pancreas  Supportive care with IV fluids, analgesics, antiemetics  p.r.n.  Continue colestipol (changed to cholestyramine), pancreatic enzyme  will do CT abdomen and pelvis with contrast    # CAD:  Continue Plavix 75 mg daily, Lipitor 20 mg daily    # hypertension:  Continue Toprol-XL 25 mg daily    # Gout:    Continue allopurinol    # hypothyroidism:  Continue Synthroid 75 mcg daily    # depression/anxiety:  Continue Lexapro 20 mg daily, BuSpar 5 mg b.i.d.    # GERD: on protonix    # Code: Full code  # Diet: Adult clear liquid diet, advance diet accordingly  # DVT prophylaxis:  Lovenox 40 mg sq daily       Elie Herrera MD  Department of Hospital Medicine  [facility] - Emergency Dept

## 2024-09-25 NOTE — HPI
The patient is a 68-year-old female with past medical history of CKD stage 3b, CAD, recurrent C diff, chronic pancreatitis, pancreatic insufficiency, chronic diarrhea, status post right hemicolectomy due to complication from abdominal surgery, GERD, hypertension, hyperlipidemia, COPD who presented to the ED  for the evaluation of abdominal pain.    The patient reports that she started having abdominal pain since Saturday.  She reports that she started having pain in the lower part of the abdomen at 1st.  She also reports that she started getting pain in the back which slowly progressed to the front of the abdomen.  She complains of nausea but no vomiting. She reports that she has not passed flatus today but had bowel movement this morning.  She reports that she has history of chronic pancreatitis.  She reports that she has chronic diarrhea and is on colestipol and pancreatic enzyme for that.  She denies any fever, chills, cough, chest pain, shortness of breath.  She complains of bilateral swelling of lower extremities.  She denies any orthopnea, PND.  She reports that she has mild COPD and quit smoking about 2 years ago.

## 2024-09-26 ENCOUNTER — ANESTHESIA (OUTPATIENT)
Dept: SURGERY | Facility: HOSPITAL | Age: 69
End: 2024-09-26
Payer: MEDICARE

## 2024-09-26 ENCOUNTER — ANESTHESIA EVENT (OUTPATIENT)
Dept: SURGERY | Facility: HOSPITAL | Age: 69
End: 2024-09-26
Payer: MEDICARE

## 2024-09-26 VITALS
RESPIRATION RATE: 15 BRPM | SYSTOLIC BLOOD PRESSURE: 185 MMHG | DIASTOLIC BLOOD PRESSURE: 81 MMHG | OXYGEN SATURATION: 100 % | HEART RATE: 64 BPM

## 2024-09-26 LAB
ALBUMIN SERPL BCP-MCNC: 3.9 G/DL (ref 3.5–5.2)
ALP SERPL-CCNC: 80 U/L (ref 55–135)
ALT SERPL W/O P-5'-P-CCNC: 11 U/L (ref 10–44)
ANION GAP SERPL CALC-SCNC: 7 MMOL/L (ref 8–16)
AST SERPL-CCNC: 15 U/L (ref 10–40)
BASOPHILS # BLD AUTO: 0.03 K/UL (ref 0–0.2)
BASOPHILS NFR BLD: 0.4 % (ref 0–1.9)
BILIRUB SERPL-MCNC: 0.3 MG/DL (ref 0.1–1)
BUN SERPL-MCNC: 11 MG/DL (ref 8–23)
CALCIUM SERPL-MCNC: 9.7 MG/DL (ref 8.7–10.5)
CHLORIDE SERPL-SCNC: 107 MMOL/L (ref 95–110)
CO2 SERPL-SCNC: 27 MMOL/L (ref 23–29)
CREAT SERPL-MCNC: 0.8 MG/DL (ref 0.5–1.4)
DIFFERENTIAL METHOD BLD: ABNORMAL
EOSINOPHIL # BLD AUTO: 0.2 K/UL (ref 0–0.5)
EOSINOPHIL NFR BLD: 2.9 % (ref 0–8)
ERYTHROCYTE [DISTWIDTH] IN BLOOD BY AUTOMATED COUNT: 15 % (ref 11.5–14.5)
EST. GFR  (NO RACE VARIABLE): >60 ML/MIN/1.73 M^2
GLUCOSE SERPL-MCNC: 92 MG/DL (ref 70–110)
HCT VFR BLD AUTO: 32.9 % (ref 37–48.5)
HGB BLD-MCNC: 10.4 G/DL (ref 12–16)
IMM GRANULOCYTES # BLD AUTO: 0.03 K/UL (ref 0–0.04)
IMM GRANULOCYTES NFR BLD AUTO: 0.4 % (ref 0–0.5)
LYMPHOCYTES # BLD AUTO: 1.8 K/UL (ref 1–4.8)
LYMPHOCYTES NFR BLD: 22.7 % (ref 18–48)
MAGNESIUM SERPL-MCNC: 1.9 MG/DL (ref 1.6–2.6)
MCH RBC QN AUTO: 29.7 PG (ref 27–31)
MCHC RBC AUTO-ENTMCNC: 31.6 G/DL (ref 32–36)
MCV RBC AUTO: 94 FL (ref 82–98)
MONOCYTES # BLD AUTO: 0.6 K/UL (ref 0.3–1)
MONOCYTES NFR BLD: 7.9 % (ref 4–15)
NEUTROPHILS # BLD AUTO: 5.2 K/UL (ref 1.8–7.7)
NEUTROPHILS NFR BLD: 65.7 % (ref 38–73)
NRBC BLD-RTO: 0 /100 WBC
PLATELET # BLD AUTO: 235 K/UL (ref 150–450)
PMV BLD AUTO: 9 FL (ref 9.2–12.9)
POTASSIUM SERPL-SCNC: 3.7 MMOL/L (ref 3.5–5.1)
PROT SERPL-MCNC: 6.1 G/DL (ref 6–8.4)
RBC # BLD AUTO: 3.5 M/UL (ref 4–5.4)
SODIUM SERPL-SCNC: 141 MMOL/L (ref 136–145)
WBC # BLD AUTO: 7.94 K/UL (ref 3.9–12.7)

## 2024-09-26 PROCEDURE — 43239 EGD BIOPSY SINGLE/MULTIPLE: CPT | Performed by: INTERNAL MEDICINE

## 2024-09-26 PROCEDURE — 25000003 PHARM REV CODE 250

## 2024-09-26 PROCEDURE — 12000002 HC ACUTE/MED SURGE SEMI-PRIVATE ROOM

## 2024-09-26 PROCEDURE — 85025 COMPLETE CBC W/AUTO DIFF WBC: CPT | Performed by: STUDENT IN AN ORGANIZED HEALTH CARE EDUCATION/TRAINING PROGRAM

## 2024-09-26 PROCEDURE — 63600175 PHARM REV CODE 636 W HCPCS: Performed by: STUDENT IN AN ORGANIZED HEALTH CARE EDUCATION/TRAINING PROGRAM

## 2024-09-26 PROCEDURE — 80053 COMPREHEN METABOLIC PANEL: CPT | Performed by: STUDENT IN AN ORGANIZED HEALTH CARE EDUCATION/TRAINING PROGRAM

## 2024-09-26 PROCEDURE — 88305 TISSUE EXAM BY PATHOLOGIST: CPT | Mod: TC,59 | Performed by: PATHOLOGY

## 2024-09-26 PROCEDURE — 0DB68ZX EXCISION OF STOMACH, VIA NATURAL OR ARTIFICIAL OPENING ENDOSCOPIC, DIAGNOSTIC: ICD-10-PCS | Performed by: INTERNAL MEDICINE

## 2024-09-26 PROCEDURE — 25000003 PHARM REV CODE 250: Performed by: NURSE ANESTHETIST, CERTIFIED REGISTERED

## 2024-09-26 PROCEDURE — 27200043 HC FORCEPS, BIOPSY: Performed by: INTERNAL MEDICINE

## 2024-09-26 PROCEDURE — 83735 ASSAY OF MAGNESIUM: CPT | Performed by: STUDENT IN AN ORGANIZED HEALTH CARE EDUCATION/TRAINING PROGRAM

## 2024-09-26 PROCEDURE — 0DB98ZX EXCISION OF DUODENUM, VIA NATURAL OR ARTIFICIAL OPENING ENDOSCOPIC, DIAGNOSTIC: ICD-10-PCS | Performed by: INTERNAL MEDICINE

## 2024-09-26 PROCEDURE — 25000003 PHARM REV CODE 250: Performed by: STUDENT IN AN ORGANIZED HEALTH CARE EDUCATION/TRAINING PROGRAM

## 2024-09-26 PROCEDURE — 36415 COLL VENOUS BLD VENIPUNCTURE: CPT | Performed by: STUDENT IN AN ORGANIZED HEALTH CARE EDUCATION/TRAINING PROGRAM

## 2024-09-26 PROCEDURE — 37000008 HC ANESTHESIA 1ST 15 MINUTES: Performed by: INTERNAL MEDICINE

## 2024-09-26 PROCEDURE — 27000207 HC ISOLATION

## 2024-09-26 RX ORDER — PROPOFOL 10 MG/ML
VIAL (ML) INTRAVENOUS
Status: DISCONTINUED | OUTPATIENT
Start: 2024-09-26 | End: 2024-09-26

## 2024-09-26 RX ORDER — METHOCARBAMOL 500 MG/1
500 TABLET, FILM COATED ORAL ONCE
Status: COMPLETED | OUTPATIENT
Start: 2024-09-26 | End: 2024-09-26

## 2024-09-26 RX ADMIN — ALLOPURINOL 300 MG: 300 TABLET ORAL at 11:09

## 2024-09-26 RX ADMIN — PANTOPRAZOLE SODIUM 40 MG: 40 TABLET, DELAYED RELEASE ORAL at 05:09

## 2024-09-26 RX ADMIN — PANCRELIPASE 3 CAPSULE: 60000; 12000; 38000 CAPSULE, DELAYED RELEASE PELLETS ORAL at 04:09

## 2024-09-26 RX ADMIN — ENOXAPARIN SODIUM 40 MG: 40 INJECTION SUBCUTANEOUS at 04:09

## 2024-09-26 RX ADMIN — ESCITALOPRAM OXALATE 20 MG: 10 TABLET ORAL at 08:09

## 2024-09-26 RX ADMIN — BUSPIRONE HYDROCHLORIDE 5 MG: 5 TABLET ORAL at 11:09

## 2024-09-26 RX ADMIN — LEVOTHYROXINE SODIUM 75 MCG: 0.03 TABLET ORAL at 05:09

## 2024-09-26 RX ADMIN — ATORVASTATIN CALCIUM 20 MG: 20 TABLET, FILM COATED ORAL at 08:09

## 2024-09-26 RX ADMIN — CLOPIDOGREL BISULFATE 75 MG: 75 TABLET, FILM COATED ORAL at 11:09

## 2024-09-26 RX ADMIN — PANCRELIPASE 3 CAPSULE: 60000; 12000; 38000 CAPSULE, DELAYED RELEASE PELLETS ORAL at 11:09

## 2024-09-26 RX ADMIN — MORPHINE SULFATE 2 MG: 2 INJECTION, SOLUTION INTRAMUSCULAR; INTRAVENOUS at 10:09

## 2024-09-26 RX ADMIN — SODIUM CHLORIDE: 9 INJECTION, SOLUTION INTRAVENOUS at 10:09

## 2024-09-26 RX ADMIN — TRAZODONE HYDROCHLORIDE 100 MG: 50 TABLET ORAL at 08:09

## 2024-09-26 RX ADMIN — Medication 100 MG: at 10:09

## 2024-09-26 RX ADMIN — METHOCARBAMOL TABLETS 500 MG: 500 TABLET, COATED ORAL at 08:09

## 2024-09-26 RX ADMIN — METOPROLOL SUCCINATE 25 MG: 25 TABLET, EXTENDED RELEASE ORAL at 08:09

## 2024-09-26 RX ADMIN — CHOLESTYRAMINE 4 GRAMS OF ANHYDROUS CHOLESTYRAMINE: 4 POWDER, FOR SUSPENSION ORAL at 11:09

## 2024-09-26 RX ADMIN — BUSPIRONE HYDROCHLORIDE 5 MG: 5 TABLET ORAL at 08:09

## 2024-09-26 RX ADMIN — MORPHINE SULFATE 2 MG: 2 INJECTION, SOLUTION INTRAMUSCULAR; INTRAVENOUS at 08:09

## 2024-09-26 RX ADMIN — GABAPENTIN 400 MG: 300 CAPSULE ORAL at 08:09

## 2024-09-26 RX ADMIN — CHOLESTYRAMINE 4 GRAMS OF ANHYDROUS CHOLESTYRAMINE: 4 POWDER, FOR SUSPENSION ORAL at 08:09

## 2024-09-26 RX ADMIN — GABAPENTIN 400 MG: 300 CAPSULE ORAL at 11:09

## 2024-09-26 RX ADMIN — ACETAMINOPHEN 650 MG: 325 TABLET ORAL at 02:09

## 2024-09-26 RX ADMIN — Medication 50 MG: at 10:09

## 2024-09-26 NOTE — SUBJECTIVE & OBJECTIVE
Interval History: pt reports pancreatitis meds not helping and having ongoing abdominal pain, would like to see a specialist     Review of Systems   Constitutional:  Positive for appetite change.   Respiratory:  Negative for shortness of breath.    Cardiovascular: Negative.    Gastrointestinal:  Positive for diarrhea. Negative for abdominal distention, abdominal pain, nausea and vomiting.        Mentions that is foul smelling, and has history of C diff in the past   Genitourinary:  Negative for pelvic pain.   Musculoskeletal:  Positive for arthralgias and back pain.   Skin: Negative.    Neurological: Negative.    Psychiatric/Behavioral: Negative.       Objective:     Vital Signs (Most Recent):  Temp: 97.9 °F (36.6 °C) (09/26/24 1532)  Pulse: (!) 58 (09/26/24 1532)  Resp: 17 (09/26/24 1532)  BP: (!) 144/79 (09/26/24 1532)  SpO2: 96 % (09/26/24 1532) Vital Signs (24h Range):  Temp:  [97.6 °F (36.4 °C)-98.3 °F (36.8 °C)] 97.9 °F (36.6 °C)  Pulse:  [58-86] 58  Resp:  [14-18] 17  SpO2:  [95 %-100 %] 96 %  BP: (140-185)/(60-85) 144/79     Weight: 86.2 kg (190 lb 0.6 oz)  Body mass index is 29.76 kg/m².    Intake/Output Summary (Last 24 hours) at 9/26/2024 1712  Last data filed at 9/26/2024 1103  Gross per 24 hour   Intake 100 ml   Output --   Net 100 ml         Physical Exam  Constitutional:       General: She is not in acute distress.  HENT:      Head: Normocephalic and atraumatic.      Mouth/Throat:      Mouth: Mucous membranes are moist.      Pharynx: Oropharynx is clear.   Cardiovascular:      Rate and Rhythm: Normal rate and regular rhythm.   Pulmonary:      Effort: Pulmonary effort is normal. No respiratory distress.      Breath sounds: No wheezing.   Abdominal:      General: There is distension.      Tenderness: There is abdominal tenderness. There is no right CVA tenderness, left CVA tenderness, guarding or rebound.      Hernia: A hernia is present.   Musculoskeletal:         General: Swelling and tenderness  present.      Cervical back: Normal range of motion and neck supple.   Skin:     General: Skin is warm and dry.   Neurological:      General: No focal deficit present.      Mental Status: She is alert and oriented to person, place, and time. Mental status is at baseline.   Psychiatric:         Mood and Affect: Mood normal.         Behavior: Behavior normal.             Significant Labs: All pertinent labs within the past 24 hours have been reviewed.  Recent Lab Results         09/26/24  0414        Albumin 3.9       ALP 80       ALT 11       Anion Gap 7       AST 15       Baso # 0.03       Basophil % 0.4       BILIRUBIN TOTAL 0.3  Comment: For infants and newborns, interpretation of results should be based  on gestational age, weight and in agreement with clinical  observations.    Premature Infant recommended reference ranges:  Up to 24 hours.............<8.0 mg/dL  Up to 48 hours............<12.0 mg/dL  3-5 days..................<15.0 mg/dL  6-29 days.................<15.0 mg/dL         BUN 11       Calcium 9.7       Chloride 107       CO2 27       Creatinine 0.8       Differential Method Automated       eGFR >60.0       Eos # 0.2       Eos % 2.9       Glucose 92       Gran # (ANC) 5.2       Gran % 65.7       Hematocrit 32.9       Hemoglobin 10.4       Immature Grans (Abs) 0.03  Comment: Mild elevation in immature granulocytes is non specific and   can be seen in a variety of conditions including stress response,   acute inflammation, trauma and pregnancy. Correlation with other   laboratory and clinical findings is essential.         Immature Granulocytes 0.4       Lymph # 1.8       Lymph % 22.7       Magnesium  1.9       MCH 29.7       MCHC 31.6       MCV 94       Mono # 0.6       Mono % 7.9       MPV 9.0       nRBC 0       Platelet Count 235       Potassium 3.7       PROTEIN TOTAL 6.1       RBC 3.50       RDW 15.0       Sodium 141       WBC 7.94               Significant Imaging: I have reviewed all pertinent  imaging results/findings within the past 24 hours.

## 2024-09-26 NOTE — ASSESSMENT & PLAN NOTE
- presented with abdominal pain  -has chronic diarrhea   -has recurrent C diff infections in the past   -recently used antibiotics-Augmentin for 10 days   -repeat C diff testing as she also mentioned that her stools are always foul-smelling  -no fever, no white count  -GI on board

## 2024-09-26 NOTE — NURSING
Nurses Note -- 4 Eyes      9/26/2024   3:15 AM      Skin assessed during: Admit      [x] No Altered Skin Integrity Present    [x]Prevention Measures Documented      [] Yes- Altered Skin Integrity Present or Discovered   [] LDA Added if Not in Epic (Describe Wound)   [] New Altered Skin Integrity was Present on Admit and Documented in LDA   [] Wound Image Taken    Wound Care Consulted? No    Attending Nurse:  Geraldine Spring RN/Staff Member:   Patricia richardson

## 2024-09-26 NOTE — PROGRESS NOTES
Formerly [facility], later [facility] Medicine  Progress Note    Patient Name: Alessia Nelson  MRN: [id number]  Patient Class: IP- Inpatient   Admission Date: 9/24/2024  Length of Stay: 1 days  Attending Physician: Varun Hua, *  Primary Care Provider: Luana Calvert MD        Subjective:     Principal Problem:<principal problem not specified>        HPI:  The patient is a 68-year-old female with past medical history of CKD stage 3b, CAD, recurrent C diff, chronic pancreatitis, pancreatic insufficiency, chronic diarrhea, status post right hemicolectomy due to complication from abdominal surgery, GERD, hypertension, hyperlipidemia, COPD who presented to the ED  for the evaluation of abdominal pain.    The patient reports that she started having abdominal pain since Saturday.  She reports that she started having pain in the lower part of the abdomen at 1st.  She also reports that she started getting pain in the back which slowly progressed to the front of the abdomen.  She complains of nausea but no vomiting. She reports that she has not passed flatus today but had bowel movement this morning.  She reports that she has history of chronic pancreatitis.  She reports that she has chronic diarrhea and is on colestipol and pancreatic enzyme for that.  She denies any fever, chills, cough, chest pain, shortness of breath.  She complains of bilateral swelling of lower extremities.  She denies any orthopnea, PND.  She reports that she has mild COPD and quit smoking about 2 years ago.      Overview/Hospital Course:  Patient admitted with abdominal pain and ongoing diarrhea despite stool bulking agent and Creon. She feels like this is another pancreatitis attack like previous ones. CT imaging did not show acute findings regarding the pancreas. Possible cystitis may explain the lower abdominal pain and started on Rocephin. UA pending. Giovana zhao has chronic pain from an ortho standpoint with right knee replacement  and thoracic and lumbar spine disease for which she goes to [facility] orthopedics and spine.   Today, pain reported 10/10 despite medications given. GI consulted.  Endoscopy planned for 9/26.  Patient complained of multiple episodes of diarrhea, with being on Augmentin for 10 days recently, C diff testing was ordered.    Interval History: pt reports pancreatitis meds not helping and having ongoing abdominal pain, would like to see a specialist     Review of Systems   Constitutional:  Positive for appetite change.   Respiratory:  Negative for shortness of breath.    Cardiovascular: Negative.    Gastrointestinal:  Positive for diarrhea. Negative for abdominal distention, abdominal pain, nausea and vomiting.        Mentions that is foul smelling, and has history of C diff in the past   Genitourinary:  Negative for pelvic pain.   Musculoskeletal:  Positive for arthralgias and back pain.   Skin: Negative.    Neurological: Negative.    Psychiatric/Behavioral: Negative.       Objective:     Vital Signs (Most Recent):  Temp: 97.9 °F (36.6 °C) (09/26/24 1532)  Pulse: (!) 58 (09/26/24 1532)  Resp: 17 (09/26/24 1532)  BP: (!) 144/79 (09/26/24 1532)  SpO2: 96 % (09/26/24 1532) Vital Signs (24h Range):  Temp:  [97.6 °F (36.4 °C)-98.3 °F (36.8 °C)] 97.9 °F (36.6 °C)  Pulse:  [58-86] 58  Resp:  [14-18] 17  SpO2:  [95 %-100 %] 96 %  BP: (140-185)/(60-85) 144/79     Weight: 86.2 kg (190 lb 0.6 oz)  Body mass index is 29.76 kg/m².    Intake/Output Summary (Last 24 hours) at 9/26/2024 1712  Last data filed at 9/26/2024 1103  Gross per 24 hour   Intake 100 ml   Output --   Net 100 ml         Physical Exam  Constitutional:       General: She is not in acute distress.  HENT:      Head: Normocephalic and atraumatic.      Mouth/Throat:      Mouth: Mucous membranes are moist.      Pharynx: Oropharynx is clear.   Cardiovascular:      Rate and Rhythm: Normal rate and regular rhythm.   Pulmonary:      Effort: Pulmonary effort is normal. No  respiratory distress.      Breath sounds: No wheezing.   Abdominal:      General: There is distension.      Tenderness: There is abdominal tenderness. There is no right CVA tenderness, left CVA tenderness, guarding or rebound.      Hernia: A hernia is present.   Musculoskeletal:         General: Swelling and tenderness present.      Cervical back: Normal range of motion and neck supple.   Skin:     General: Skin is warm and dry.   Neurological:      General: No focal deficit present.      Mental Status: She is alert and oriented to person, place, and time. Mental status is at baseline.   Psychiatric:         Mood and Affect: Mood normal.         Behavior: Behavior normal.             Significant Labs: All pertinent labs within the past 24 hours have been reviewed.  Recent Lab Results         09/26/24  0414        Albumin 3.9       ALP 80       ALT 11       Anion Gap 7       AST 15       Baso # 0.03       Basophil % 0.4       BILIRUBIN TOTAL 0.3  Comment: For infants and newborns, interpretation of results should be based  on gestational age, weight and in agreement with clinical  observations.    Premature Infant recommended reference ranges:  Up to 24 hours.............<8.0 mg/dL  Up to 48 hours............<12.0 mg/dL  3-5 days..................<15.0 mg/dL  6-29 days.................<15.0 mg/dL         BUN 11       Calcium 9.7       Chloride 107       CO2 27       Creatinine 0.8       Differential Method Automated       eGFR >60.0       Eos # 0.2       Eos % 2.9       Glucose 92       Gran # (ANC) 5.2       Gran % 65.7       Hematocrit 32.9       Hemoglobin 10.4       Immature Grans (Abs) 0.03  Comment: Mild elevation in immature granulocytes is non specific and   can be seen in a variety of conditions including stress response,   acute inflammation, trauma and pregnancy. Correlation with other   laboratory and clinical findings is essential.         Immature Granulocytes 0.4       Lymph # 1.8       Lymph % 22.7        Magnesium  1.9       MCH 29.7       MCHC 31.6       MCV 94       Mono # 0.6       Mono % 7.9       MPV 9.0       nRBC 0       Platelet Count 235       Potassium 3.7       PROTEIN TOTAL 6.1       RBC 3.50       RDW 15.0       Sodium 141       WBC 7.94               Significant Imaging: I have reviewed all pertinent imaging results/findings within the past 24 hours.    Assessment/Plan:      Lumbar radiculopathy  Resume pain meds and gabapentin, robaxin  Follow up with [facility]     Hypothyroidism due to acquired atrophy of thyroid    Resume synthroid  We will check TSH with ongoing diarrhea      Pancreatic insufficiency  Continue Creon and stool bulking agent  GI on board, appreciate their recommendations      Hypomagnesemia  Patient has Abnormal Magnesium: hypomagnesemia. Will continue to monitor electrolytes closely. Will replace the affected electrolytes and repeat labs to be done after interventions completed. The patient's magnesium results have been reviewed and are listed below.  Recent Labs   Lab 09/26/24  0414   MG 1.9      Improved    History of pancreatitis  On cholestyramine and Creon - she reports not helpful  Abdominal pain increased before presentation, CT abdomen and pelvis without acute pathology On clear liquids  Pain control  IVF  GI consulted  NPO for Endoscopy 9/26    Gastritis  -presented with abdominal pain  -GI on board  -endoscopy 9/26 showing gastritis   -biopsy sent  -we will be started on liquid diet following the endoscopy   -we will advance as tolerated  -Protonix daily (we will check for C diff as there will be increased risk) and Carafate trial as per GI      Diarrhea, chronic  - presented with abdominal pain  -has chronic diarrhea   -has recurrent C diff infections in the past   -recently used antibiotics-Augmentin for 10 days   -repeat C diff testing as she also mentioned that her stools are always foul-smelling  -no fever, no white count  -GI on board        VTE  Risk Mitigation (From admission, onward)           Ordered     enoxaparin injection 40 mg  Daily         09/24/24 2304     IP VTE HIGH RISK PATIENT  Once         09/24/24 2304     Place sequential compression device  Until discontinued         09/24/24 2304                    Discharge Planning   JOSE ANTONIO: 9/28/2024     Code Status: Full Code   Is the patient medically ready for discharge?:     Reason for patient still in hospital (select all that apply): Patient trending condition and Treatment  Discharge Plan A: Home                  Varun Hua MD  Department of Hospital Medicine   [facility]

## 2024-09-26 NOTE — ANESTHESIA POSTPROCEDURE EVALUATION
Anesthesia Post Evaluation    Patient: Alessia Nelson    Procedure(s) Performed: Procedure(s) (LRB):  EGD (ESOPHAGOGASTRODUODENOSCOPY) (N/A)    Final Anesthesia Type: general      Patient location during evaluation: GI PACU  Patient participation: Yes- Able to Participate  Level of consciousness: awake and alert  Post-procedure vital signs: reviewed and stable  Pain management: adequate  Airway patency: patent    PONV status at discharge: No PONV  Anesthetic complications: no      Cardiovascular status: stable  Respiratory status: unassisted  Hydration status: euvolemic  Follow-up not needed.              Vitals Value Taken Time   /67 09/26/24 1110   Temp  09/26/24 1116   Pulse 70 09/26/24 1114   Resp 15 09/26/24 1114   SpO2 96 % 09/26/24 1114   Vitals shown include unfiled device data.      No case tracking events are documented in the log.      Pain/Radha Score: Pain Rating Prior to Med Admin: 8 (9/26/2024  8:10 AM)  Pain Rating Post Med Admin: 3 (9/25/2024 10:20 PM)

## 2024-09-26 NOTE — [employer]OVATION PATIENT INSTRUCTIONS
Discharge Summary/Instructions after an Endoscopic Procedure  Patient Name: Alessia Nelson  Patient MRN: [id number]  Patient YOB: 1955  Thursday, September 26, 2024  Jovanny Saldivar MD  RESTRICTIONS:  During your procedure today, you received medications for sedation.  These   medications may affect your judgment, balance and coordination.  Therefore,   for 24 hours, you have the following restrictions:   - DO NOT drive a car, operate machinery, make legal/financial decisions,   sign important papers or drink alcohol.    ACTIVITY:  Today: no heavy lifting, straining or running due to procedural   sedation/anesthesia.  The following day: return to full activity including work.  DIET:  Eat and drink normally unless instructed otherwise.     TREATMENT FOR COMMON SIDE EFFECTS:  - Mild abdominal pain, nausea, belching, bloating or excessive gas:  rest,   eat lightly and use a heating pad.  - Sore Throat: treat with throat lozenges and/or gargle with warm salt   water.  - Because air was used during the procedure, expelling large amounts of air   from your rectum or belching is normal.  - If a bowel prep was taken, you may not have a bowel movement for 1-3 days.    This is normal.  SYMPTOMS TO WATCH FOR AND REPORT TO YOUR PHYSICIAN:  1. Abdominal pain or bloating, other than gas cramps.  2. Chest pain.  3. Back pain.  4. Signs of infection such as: chills or fever occurring within 24 hours   after the procedure.  5. Rectal bleeding, which would show as bright red, maroon, or black stools.   (A tablespoon of blood from the rectum is not serious, especially if   hemorrhoids are present.)  6. Vomiting.  7. Weakness or dizziness.  GO DIRECTLY TO THE NEAREST EMERGENCY ROOM IF YOU HAVE ANY OF THE FOLLOWING:      Difficulty breathing              Chills and/or fever over 101 F   Persistent vomiting and/or vomiting blood   Severe abdominal pain   Severe chest pain   Black, tarry stools   Bleeding- more than one  tablespoon   Any other symptom or condition that you feel may need urgent attention  Your doctor recommends these additional instructions:  If any biopsies were taken, your doctors clinic will contact you in 1 to 2   weeks with any results.  - Patient has a contact number available for emergencies.  The signs and   symptoms of potential delayed complications were discussed with the   patient.  Return to normal activities tomorrow.  Written discharge   instructions were provided to the patient.   - Resume previous diet.   - Continue present medications.   - Await pathology results.   - Follow up with [facility] Dr Carona on discharge  - PPI daily  - Trial of carafate TID  - Return patient to hospital [facility] for ongoing care.  For questions, problems or results please call your physician - Jovanny Saldivar MD at Work:  [phone].  [facility], EMERGENCY ROOM PHONE NUMBER: [phone]  IF A COMPLICATION OR EMERGENCY SITUATION ARISES AND YOU ARE UNABLE TO REACH   YOUR PHYSICIAN - GO DIRECTLY TO THE EMERGENCY ROOM.  MD Jovanny Latif MD  9/26/2024 11:06:52 AM  This report has been verified and signed electronically.  Dear patient,  As a result of recent federal legislation (The Federal Cures Act), you may   receive lab or pathology results from your procedure in your [facility]   account before your physician is able to contact you. Your physician or   their representative will relay the results to you with their   recommendations at their soonest availability.  Thank you,  PROVATION

## 2024-09-26 NOTE — PLAN OF CARE
Problem: Adult Inpatient Plan of Care  Goal: Plan of Care Review  Outcome: Progressing  Goal: Patient-Specific Goal (Individualized)  Outcome: Progressing  Goal: Absence of Hospital-Acquired Illness or Injury  Outcome: Progressing  Goal: Optimal Comfort and Wellbeing  Outcome: Progressing  Goal: Readiness for Transition of Care  Outcome: Progressing     Problem: Infection  Goal: Absence of Infection Signs and Symptoms  Outcome: Progressing     Problem: Fall Injury Risk  Goal: Absence of Fall and Fall-Related Injury  Outcome: Progressing      16-Feb-2023

## 2024-09-26 NOTE — ASSESSMENT & PLAN NOTE
On cholestyramine and Creon - she reports not helpful  Abdominal pain increased before presentation, CT abdomen and pelvis without acute pathology On clear liquids  Pain control  IVF  GI consulted  NPO for Endoscopy 9/26

## 2024-09-26 NOTE — ANESTHESIA PREPROCEDURE EVALUATION
09/26/2024  Alessia Nelson is a 68 y.o., female.      Pre-op Assessment    I have reviewed the Patient Summary Reports.     I have reviewed the Nursing Notes. I have reviewed the NPO Status.   I have reviewed the Medications.     Review of Systems  Anesthesia Hx:             Denies Family Hx of Anesthesia complications.    Denies Personal Hx of Anesthesia complications.                    Social:  Former Smoker, No Alcohol Use       Hematology/Oncology:       -- Anemia:               Hematology Comments: History of blood clots.  Last Plavix 2 days ago.                    EENT/Dental:   Chronic hoarseness          Cardiovascular:     Hypertension   CAD  asymptomatic CABG/stent (Coronary stent)    CHF (episode years ago)        06/24/2024 echo normal except for trileaflet aortic valve and trivial pericardial effusion   Coronary Artery Disease:               Congestive Heart Failure (CHF)                Hypertension         Pulmonary:   COPD, mild    Recent URI (sinusitis, improving with antibiotics)     Chronic Obstructive Pulmonary Disease (COPD):                      Renal/:  Chronic Renal Disease        Kidney Function/Disease             Hepatic/GI:     GERD   Patient admitted with abdominal pain, possible acute on chronic pancreatitis.  Nausea but no vomiting.  No nausea today.   Gerd          Musculoskeletal:  Arthritis   Fibromyalgia     Arthritis     Spine Disorders: (regular hydrocodone use) cervical and lumbar Chronic Pain           Neurological:  TIA, (no residual)  Denies CVA.   Headaches Seizures (40 years ago due to Phenergan)     Dx of Headaches   Arthritis  Peripheral Neuropathy     Seizure Disorder             CVA - Cerebrovasular Accident     TIA - Transient Ischemic Attack             Neuromuscular Disease   Endocrine:   Hypothyroidism       Hypothyroidism          Psych:  Psychiatric  History                  Physical Exam  General: Well nourished, Cooperative, Alert and Oriented    Airway:  Mallampati: III / II  Mouth Opening: Normal  TM Distance: > 6 cm  Tongue: Normal  Neck ROM: Normal ROM    Dental:  Edentulous    Chest/Lungs:  Clear to auscultation, Normal Respiratory Rate    Heart:  Rate: Normal  Rhythm: Regular Rhythm  Sounds: Normal        Anesthesia Plan  Type of Anesthesia, risks & benefits discussed:    Anesthesia Type: Gen Natural Airway  Intra-op Monitoring Plan: Standard ASA Monitors  Induction:  IV  Informed Consent: Informed consent signed with the Patient and all parties understand the risks and agree with anesthesia plan.  All questions answered.   ASA Score: 3    Ready For Surgery From Anesthesia Perspective.     .

## 2024-09-26 NOTE — ASSESSMENT & PLAN NOTE
Patient has Abnormal Magnesium: hypomagnesemia. Will continue to monitor electrolytes closely. Will replace the affected electrolytes and repeat labs to be done after interventions completed. The patient's magnesium results have been reviewed and are listed below.  Recent Labs   Lab 09/26/24  0414   MG 1.9      Improved

## 2024-09-26 NOTE — TRANSFER OF CARE
"Anesthesia Transfer of Care Note    Patient: Alessia Nelson    Procedure(s) Performed: Procedure(s) (LRB):  EGD (ESOPHAGOGASTRODUODENOSCOPY) (N/A)    Patient location: GI    Anesthesia Type: general    Transport from OR: Transported from OR on room air with adequate spontaneous ventilation    Post pain: adequate analgesia    Post assessment: no apparent anesthetic complications and tolerated procedure well    Post vital signs: stable    Level of consciousness: awake    Nausea/Vomiting: no nausea/vomiting    Complications: none    Transfer of care protocol was followed      Last vitals: Visit Vitals  BP (!) 140/79   Pulse 86   Temp 36.5 °C (97.7 °F)   Resp 16   Ht 5' 7" (1.702 m)   Wt 86.2 kg (190 lb 0.6 oz)   SpO2 95%   BMI 29.76 kg/m²     "

## 2024-09-26 NOTE — ASSESSMENT & PLAN NOTE
-presented with abdominal pain  -GI on board  -endoscopy 9/26 showing gastritis   -biopsy sent  -we will be started on liquid diet following the endoscopy   -we will advance as tolerated  -Protonix daily (we will check for C diff as there will be increased risk) and Carafate trial as per GI

## 2024-09-26 NOTE — PLAN OF CARE
"SW called [facility] to verify if pt is currently receiving their service. According to Elvira with [facility], pt was discharged on 9/29/2022. However, pt is "welcomed to come back".    SW to send referral if HH is recommended.  "

## 2024-09-27 LAB
ALBUMIN SERPL BCP-MCNC: 3.5 G/DL (ref 3.5–5.2)
ALP SERPL-CCNC: 73 U/L (ref 55–135)
ALT SERPL W/O P-5'-P-CCNC: 9 U/L (ref 10–44)
ANION GAP SERPL CALC-SCNC: 7 MMOL/L (ref 8–16)
AST SERPL-CCNC: 11 U/L (ref 10–40)
BASOPHILS # BLD AUTO: 0.03 K/UL (ref 0–0.2)
BASOPHILS NFR BLD: 0.4 % (ref 0–1.9)
BILIRUB SERPL-MCNC: 0.2 MG/DL (ref 0.1–1)
BUN SERPL-MCNC: 13 MG/DL (ref 8–23)
CALCIUM SERPL-MCNC: 9.5 MG/DL (ref 8.7–10.5)
CHLORIDE SERPL-SCNC: 109 MMOL/L (ref 95–110)
CO2 SERPL-SCNC: 26 MMOL/L (ref 23–29)
CREAT SERPL-MCNC: 1 MG/DL (ref 0.5–1.4)
DIFFERENTIAL METHOD BLD: ABNORMAL
EOSINOPHIL # BLD AUTO: 0.2 K/UL (ref 0–0.5)
EOSINOPHIL NFR BLD: 2.3 % (ref 0–8)
ERYTHROCYTE [DISTWIDTH] IN BLOOD BY AUTOMATED COUNT: 14.8 % (ref 11.5–14.5)
EST. GFR  (NO RACE VARIABLE): >60 ML/MIN/1.73 M^2
GLUCOSE SERPL-MCNC: 124 MG/DL (ref 70–110)
HCT VFR BLD AUTO: 30.4 % (ref 37–48.5)
HGB BLD-MCNC: 9.6 G/DL (ref 12–16)
IMM GRANULOCYTES # BLD AUTO: 0.03 K/UL (ref 0–0.04)
IMM GRANULOCYTES NFR BLD AUTO: 0.4 % (ref 0–0.5)
LYMPHOCYTES # BLD AUTO: 1.9 K/UL (ref 1–4.8)
LYMPHOCYTES NFR BLD: 23.7 % (ref 18–48)
MAGNESIUM SERPL-MCNC: 1.7 MG/DL (ref 1.6–2.6)
MCH RBC QN AUTO: 30 PG (ref 27–31)
MCHC RBC AUTO-ENTMCNC: 31.6 G/DL (ref 32–36)
MCV RBC AUTO: 95 FL (ref 82–98)
MONOCYTES # BLD AUTO: 0.6 K/UL (ref 0.3–1)
MONOCYTES NFR BLD: 7.3 % (ref 4–15)
NEUTROPHILS # BLD AUTO: 5.4 K/UL (ref 1.8–7.7)
NEUTROPHILS NFR BLD: 65.9 % (ref 38–73)
NRBC BLD-RTO: 0 /100 WBC
PLATELET # BLD AUTO: 211 K/UL (ref 150–450)
PMV BLD AUTO: 9.1 FL (ref 9.2–12.9)
POTASSIUM SERPL-SCNC: 3.7 MMOL/L (ref 3.5–5.1)
PROT SERPL-MCNC: 5.6 G/DL (ref 6–8.4)
RBC # BLD AUTO: 3.2 M/UL (ref 4–5.4)
SODIUM SERPL-SCNC: 142 MMOL/L (ref 136–145)
TSH SERPL DL<=0.005 MIU/L-ACNC: 3.8 UIU/ML (ref 0.34–5.6)
WBC # BLD AUTO: 8.18 K/UL (ref 3.9–12.7)

## 2024-09-27 PROCEDURE — 80053 COMPREHEN METABOLIC PANEL: CPT | Performed by: STUDENT IN AN ORGANIZED HEALTH CARE EDUCATION/TRAINING PROGRAM

## 2024-09-27 PROCEDURE — 25000003 PHARM REV CODE 250

## 2024-09-27 PROCEDURE — 12000002 HC ACUTE/MED SURGE SEMI-PRIVATE ROOM

## 2024-09-27 PROCEDURE — 84443 ASSAY THYROID STIM HORMONE: CPT

## 2024-09-27 PROCEDURE — 36415 COLL VENOUS BLD VENIPUNCTURE: CPT | Performed by: STUDENT IN AN ORGANIZED HEALTH CARE EDUCATION/TRAINING PROGRAM

## 2024-09-27 PROCEDURE — 27000207 HC ISOLATION

## 2024-09-27 PROCEDURE — 83735 ASSAY OF MAGNESIUM: CPT | Performed by: STUDENT IN AN ORGANIZED HEALTH CARE EDUCATION/TRAINING PROGRAM

## 2024-09-27 PROCEDURE — 85025 COMPLETE CBC W/AUTO DIFF WBC: CPT | Performed by: STUDENT IN AN ORGANIZED HEALTH CARE EDUCATION/TRAINING PROGRAM

## 2024-09-27 PROCEDURE — 25000003 PHARM REV CODE 250: Performed by: STUDENT IN AN ORGANIZED HEALTH CARE EDUCATION/TRAINING PROGRAM

## 2024-09-27 PROCEDURE — 63600175 PHARM REV CODE 636 W HCPCS: Performed by: STUDENT IN AN ORGANIZED HEALTH CARE EDUCATION/TRAINING PROGRAM

## 2024-09-27 RX ORDER — SIMETHICONE 80 MG
1 TABLET,CHEWABLE ORAL 3 TIMES DAILY PRN
Status: DISCONTINUED | OUTPATIENT
Start: 2024-09-27 | End: 2024-10-01 | Stop reason: HOSPADM

## 2024-09-27 RX ADMIN — PANTOPRAZOLE SODIUM 40 MG: 40 TABLET, DELAYED RELEASE ORAL at 05:09

## 2024-09-27 RX ADMIN — ESCITALOPRAM OXALATE 20 MG: 10 TABLET ORAL at 08:09

## 2024-09-27 RX ADMIN — MORPHINE SULFATE 2 MG: 2 INJECTION, SOLUTION INTRAMUSCULAR; INTRAVENOUS at 07:09

## 2024-09-27 RX ADMIN — MORPHINE SULFATE 2 MG: 2 INJECTION, SOLUTION INTRAMUSCULAR; INTRAVENOUS at 03:09

## 2024-09-27 RX ADMIN — CHOLESTYRAMINE 4 GRAMS OF ANHYDROUS CHOLESTYRAMINE: 4 POWDER, FOR SUSPENSION ORAL at 09:09

## 2024-09-27 RX ADMIN — MORPHINE SULFATE 2 MG: 2 INJECTION, SOLUTION INTRAMUSCULAR; INTRAVENOUS at 04:09

## 2024-09-27 RX ADMIN — SIMETHICONE 80 MG: 80 TABLET, CHEWABLE ORAL at 01:09

## 2024-09-27 RX ADMIN — METOPROLOL SUCCINATE 25 MG: 25 TABLET, EXTENDED RELEASE ORAL at 07:09

## 2024-09-27 RX ADMIN — PANCRELIPASE 3 CAPSULE: 60000; 12000; 38000 CAPSULE, DELAYED RELEASE PELLETS ORAL at 01:09

## 2024-09-27 RX ADMIN — ENOXAPARIN SODIUM 40 MG: 40 INJECTION SUBCUTANEOUS at 04:09

## 2024-09-27 RX ADMIN — Medication 800 MG: at 07:09

## 2024-09-27 RX ADMIN — PANCRELIPASE 3 CAPSULE: 60000; 12000; 38000 CAPSULE, DELAYED RELEASE PELLETS ORAL at 04:09

## 2024-09-27 RX ADMIN — POTASSIUM BICARBONATE 50 MEQ: 977.5 TABLET, EFFERVESCENT ORAL at 07:09

## 2024-09-27 RX ADMIN — BUSPIRONE HYDROCHLORIDE 5 MG: 5 TABLET ORAL at 08:09

## 2024-09-27 RX ADMIN — ALLOPURINOL 300 MG: 300 TABLET ORAL at 07:09

## 2024-09-27 RX ADMIN — PANCRELIPASE 3 CAPSULE: 60000; 12000; 38000 CAPSULE, DELAYED RELEASE PELLETS ORAL at 07:09

## 2024-09-27 RX ADMIN — BUSPIRONE HYDROCHLORIDE 5 MG: 5 TABLET ORAL at 07:09

## 2024-09-27 RX ADMIN — MORPHINE SULFATE 2 MG: 2 INJECTION, SOLUTION INTRAMUSCULAR; INTRAVENOUS at 11:09

## 2024-09-27 RX ADMIN — ATORVASTATIN CALCIUM 20 MG: 20 TABLET, FILM COATED ORAL at 08:09

## 2024-09-27 RX ADMIN — GABAPENTIN 400 MG: 300 CAPSULE ORAL at 07:09

## 2024-09-27 RX ADMIN — LEVOTHYROXINE SODIUM 75 MCG: 0.03 TABLET ORAL at 05:09

## 2024-09-27 RX ADMIN — ACETAMINOPHEN 650 MG: 325 TABLET ORAL at 12:09

## 2024-09-27 RX ADMIN — CLOPIDOGREL BISULFATE 75 MG: 75 TABLET, FILM COATED ORAL at 07:09

## 2024-09-27 RX ADMIN — TRAZODONE HYDROCHLORIDE 100 MG: 50 TABLET ORAL at 08:09

## 2024-09-27 RX ADMIN — GABAPENTIN 400 MG: 300 CAPSULE ORAL at 08:09

## 2024-09-27 NOTE — PLAN OF CARE
Patient reports 10 episodes of diarrhea on today - GI notified of above    CM following and remains available. Discharge plan is home - patient to drive self     09/27/24 1186   Discharge Reassessment   Assessment Type Discharge Planning Reassessment   Did the patient's condition or plan change since previous assessment? Yes        Garima MOORE OSCAR Chambers Garima MOORE OSCAR Mixon Apurva MOORE Apurva MOORE Aurora Sun, NP Chaparrita MOORE MAR Aurora Sun, NP Chaparrita MOORE OSCAR Mixon

## 2024-09-28 PROBLEM — K58.0 IRRITABLE BOWEL SYNDROME WITH DIARRHEA: Status: ACTIVE | Noted: 2024-09-28

## 2024-09-28 PROBLEM — E83.42 HYPOMAGNESEMIA: Status: RESOLVED | Noted: 2020-03-05 | Resolved: 2024-09-28

## 2024-09-28 LAB
OHS QRS DURATION: 92 MS
OHS QTC CALCULATION: 444 MS

## 2024-09-28 PROCEDURE — 12000002 HC ACUTE/MED SURGE SEMI-PRIVATE ROOM

## 2024-09-28 PROCEDURE — 63600175 PHARM REV CODE 636 W HCPCS: Performed by: STUDENT IN AN ORGANIZED HEALTH CARE EDUCATION/TRAINING PROGRAM

## 2024-09-28 PROCEDURE — 25000003 PHARM REV CODE 250

## 2024-09-28 PROCEDURE — 25000003 PHARM REV CODE 250: Performed by: STUDENT IN AN ORGANIZED HEALTH CARE EDUCATION/TRAINING PROGRAM

## 2024-09-28 PROCEDURE — 25000003 PHARM REV CODE 250: Performed by: FAMILY MEDICINE

## 2024-09-28 RX ORDER — METHOCARBAMOL 500 MG/1
500 TABLET, FILM COATED ORAL 4 TIMES DAILY PRN
Status: DISCONTINUED | OUTPATIENT
Start: 2024-09-28 | End: 2024-10-01 | Stop reason: HOSPADM

## 2024-09-28 RX ORDER — DICYCLOMINE HYDROCHLORIDE 20 MG/1
20 TABLET ORAL EVERY 6 HOURS PRN
Qty: 56 TABLET | Refills: 0 | Status: SHIPPED | OUTPATIENT
Start: 2024-09-28 | End: 2024-10-12

## 2024-09-28 RX ORDER — AMLODIPINE BESYLATE 5 MG/1
5 TABLET ORAL DAILY
Status: DISCONTINUED | OUTPATIENT
Start: 2024-09-28 | End: 2024-10-01 | Stop reason: HOSPADM

## 2024-09-28 RX ORDER — ALUMINUM HYDROXIDE, MAGNESIUM HYDROXIDE, AND SIMETHICONE 1200; 120; 1200 MG/30ML; MG/30ML; MG/30ML
30 SUSPENSION ORAL 4 TIMES DAILY PRN
Qty: 769 ML | Refills: 0 | Status: SHIPPED | OUTPATIENT
Start: 2024-09-28 | End: 2024-10-03

## 2024-09-28 RX ADMIN — LEVOTHYROXINE SODIUM 75 MCG: 0.03 TABLET ORAL at 05:09

## 2024-09-28 RX ADMIN — SIMETHICONE 80 MG: 80 TABLET, CHEWABLE ORAL at 02:09

## 2024-09-28 RX ADMIN — PANCRELIPASE 3 CAPSULE: 60000; 12000; 38000 CAPSULE, DELAYED RELEASE PELLETS ORAL at 09:09

## 2024-09-28 RX ADMIN — TRAZODONE HYDROCHLORIDE 100 MG: 50 TABLET ORAL at 10:09

## 2024-09-28 RX ADMIN — ESCITALOPRAM OXALATE 20 MG: 10 TABLET ORAL at 10:09

## 2024-09-28 RX ADMIN — PANCRELIPASE 3 CAPSULE: 60000; 12000; 38000 CAPSULE, DELAYED RELEASE PELLETS ORAL at 11:09

## 2024-09-28 RX ADMIN — ATORVASTATIN CALCIUM 20 MG: 20 TABLET, FILM COATED ORAL at 10:09

## 2024-09-28 RX ADMIN — HYDRALAZINE HYDROCHLORIDE 10 MG: 20 INJECTION INTRAMUSCULAR; INTRAVENOUS at 10:09

## 2024-09-28 RX ADMIN — MORPHINE SULFATE 2 MG: 2 INJECTION, SOLUTION INTRAMUSCULAR; INTRAVENOUS at 09:09

## 2024-09-28 RX ADMIN — ENOXAPARIN SODIUM 40 MG: 40 INJECTION SUBCUTANEOUS at 06:09

## 2024-09-28 RX ADMIN — PANTOPRAZOLE SODIUM 40 MG: 40 TABLET, DELAYED RELEASE ORAL at 05:09

## 2024-09-28 RX ADMIN — CHOLESTYRAMINE 4 GRAMS OF ANHYDROUS CHOLESTYRAMINE: 4 POWDER, FOR SUSPENSION ORAL at 10:09

## 2024-09-28 RX ADMIN — PANCRELIPASE 3 CAPSULE: 60000; 12000; 38000 CAPSULE, DELAYED RELEASE PELLETS ORAL at 06:09

## 2024-09-28 RX ADMIN — METOPROLOL SUCCINATE 25 MG: 25 TABLET, EXTENDED RELEASE ORAL at 09:09

## 2024-09-28 RX ADMIN — ALLOPURINOL 300 MG: 300 TABLET ORAL at 09:09

## 2024-09-28 RX ADMIN — ACETAMINOPHEN 650 MG: 325 TABLET ORAL at 10:09

## 2024-09-28 RX ADMIN — METHOCARBAMOL TABLETS 500 MG: 500 TABLET, COATED ORAL at 11:09

## 2024-09-28 RX ADMIN — MORPHINE SULFATE 2 MG: 2 INJECTION, SOLUTION INTRAMUSCULAR; INTRAVENOUS at 05:09

## 2024-09-28 RX ADMIN — GABAPENTIN 400 MG: 300 CAPSULE ORAL at 09:09

## 2024-09-28 RX ADMIN — ONDANSETRON 4 MG: 2 INJECTION INTRAMUSCULAR; INTRAVENOUS at 05:09

## 2024-09-28 RX ADMIN — AMLODIPINE BESYLATE 5 MG: 5 TABLET ORAL at 06:09

## 2024-09-28 RX ADMIN — BUSPIRONE HYDROCHLORIDE 5 MG: 5 TABLET ORAL at 09:09

## 2024-09-28 RX ADMIN — CLOPIDOGREL BISULFATE 75 MG: 75 TABLET, FILM COATED ORAL at 09:09

## 2024-09-28 RX ADMIN — MORPHINE SULFATE 2 MG: 2 INJECTION, SOLUTION INTRAMUSCULAR; INTRAVENOUS at 11:09

## 2024-09-28 RX ADMIN — METHOCARBAMOL TABLETS 500 MG: 500 TABLET, COATED ORAL at 10:09

## 2024-09-28 RX ADMIN — BUSPIRONE HYDROCHLORIDE 5 MG: 5 TABLET ORAL at 10:09

## 2024-09-28 RX ADMIN — METHOCARBAMOL TABLETS 500 MG: 500 TABLET, COATED ORAL at 12:09

## 2024-09-28 RX ADMIN — ACETAMINOPHEN 650 MG: 325 TABLET ORAL at 03:09

## 2024-09-28 RX ADMIN — GABAPENTIN 400 MG: 300 CAPSULE ORAL at 10:09

## 2024-09-28 NOTE — DISCHARGE INSTRUCTIONS
Discharge Instructions, [facility]    Thank you for choosing [facility] for your medical care. The primary doctor who is taking care of you at the time of your discharge is Varun Hua, *.     You were admitted to the hospital with Diarrhea.     Please note your discharge instructions, including diet/activity restrictions, follow-up appointments, and medication changes.  If you have any questions about your medical issues, prescriptions, or any other questions, please feel free to contact the [facility] Medicine Dept at [phone] and we will help.    If you are previously with Home health, outpatient PT/OT or under a therapy program, you are cleared to return to those programs.    Please direct all long term medication refills and follow up to your primary care provider, Luana Calvert MD. Thank you again for letting us take care of your health care needs.    Please note the following discharge instructions per your discharging physician-      Please follow up with Gastroenterology as outpatient.

## 2024-09-28 NOTE — PROGRESS NOTES
[facility] Medicine  Progress Note    Patient Name: Alessia Nelson  MRN: [id number]  Patient Class: IP- Inpatient   Admission Date: 9/24/2024  Length of Stay: 2 days  Attending Physician: Varun Hua, *  Primary Care Provider: Luana Calvert MD        Subjective:     Principal Problem:<principal problem not specified>        HPI:  The patient is a 68-year-old female with past medical history of CKD stage 3b, CAD, recurrent C diff, chronic pancreatitis, pancreatic insufficiency, chronic diarrhea, status post right hemicolectomy due to complication from abdominal surgery, GERD, hypertension, hyperlipidemia, COPD who presented to the ED  for the evaluation of abdominal pain.    The patient reports that she started having abdominal pain since Saturday.  She reports that she started having pain in the lower part of the abdomen at 1st.  She also reports that she started getting pain in the back which slowly progressed to the front of the abdomen.  She complains of nausea but no vomiting. She reports that she has not passed flatus today but had bowel movement this morning.  She reports that she has history of chronic pancreatitis.  She reports that she has chronic diarrhea and is on colestipol and pancreatic enzyme for that.  She denies any fever, chills, cough, chest pain, shortness of breath.  She complains of bilateral swelling of lower extremities.  She denies any orthopnea, PND.  She reports that she has mild COPD and quit smoking about 2 years ago.      Overview/Hospital Course:  Patient admitted with abdominal pain and ongoing diarrhea despite stool bulking agent and Creon. She feels like this is another pancreatitis attack like previous ones. CT imaging did not show acute findings regarding the pancreas. Possible cystitis may explain the lower abdominal pain and started on Rocephin. UA pending. Giovana zhao has chronic pain from an ortho standpoint with right knee replacement  and thoracic and lumbar spine disease for which she goes to [facility] spine.   Today, pain reported 10/10 despite medications given. GI consulted.  Endoscopy planned for 9/26.  Patient complained of multiple episodes of diarrhea, with being on Augmentin for 10 days recently, she also mentioned they were foul smelling, C diff testing was ordered but her stool was semi formed and lab canceled.  On 09/27-in the last 24 hours she had 9 episodes of diarrhea for which we reconsulted GI to touch base about possible flexible sigmoidoscopy, awaiting recommendations.  For gaseous distention, simethicone was ordered.    Interval History:  Over the last 24 hours she had 9 episodes of loose stools.  C diff sample could not be sent as it did not fit the C diff protocol criteria, hence the order had to be canceled by the lab.  She complained of gaseous distention this morning, simethicone ordered.        Review of Systems   Constitutional:  Positive for appetite change.   Respiratory:  Negative for shortness of breath.    Cardiovascular: Negative.    Gastrointestinal:  Positive for abdominal distention and diarrhea. Negative for abdominal pain, nausea and vomiting.        Mentions that is foul smelling, and has history of C diff in the past   Genitourinary:  Negative for pelvic pain.   Musculoskeletal:  Positive for arthralgias and back pain.   Skin: Negative.    Neurological: Negative.    Psychiatric/Behavioral: Negative.       Objective:     Vital Signs (Most Recent):  Temp: 98.8 °F (37.1 °C) (09/27/24 2004)  Pulse: 66 (09/27/24 2004)  Resp: 15 (09/27/24 2004)  BP: (!) 164/85 (09/27/24 2004)  SpO2: 97 % (09/27/24 2004) Vital Signs (24h Range):  Temp:  [98 °F (36.7 °C)-98.8 °F (37.1 °C)] 98.8 °F (37.1 °C)  Pulse:  [65-72] 66  Resp:  [15-18] 15  SpO2:  [95 %-97 %] 97 %  BP: (136-186)/(66-85) 164/85     Weight: 86.2 kg (190 lb 0.6 oz)  Body mass index is 29.76 kg/m².    Intake/Output Summary (Last 24 hours) at 9/27/2024  2324  Last data filed at 9/27/2024 2040  Gross per 24 hour   Intake 360 ml   Output 1 ml   Net 359 ml         Physical Exam  Constitutional:       General: She is not in acute distress.  HENT:      Head: Normocephalic and atraumatic.      Mouth/Throat:      Mouth: Mucous membranes are moist.      Pharynx: Oropharynx is clear.   Cardiovascular:      Rate and Rhythm: Normal rate and regular rhythm.   Pulmonary:      Effort: Pulmonary effort is normal. No respiratory distress.      Breath sounds: No wheezing.   Abdominal:      General: There is distension.      Tenderness: There is no abdominal tenderness. There is no right CVA tenderness, left CVA tenderness, guarding or rebound.      Hernia: A hernia is present.   Musculoskeletal:         General: No swelling or tenderness.      Cervical back: Normal range of motion and neck supple.   Skin:     General: Skin is warm.   Neurological:      General: No focal deficit present.      Mental Status: She is alert and oriented to person, place, and time. Mental status is at baseline.   Psychiatric:         Mood and Affect: Mood normal.         Behavior: Behavior normal.           Significant Labs: All pertinent labs within the past 24 hours have been reviewed.  Recent Lab Results         09/27/24  0258        Albumin 3.5       ALP 73       ALT 9       Anion Gap 7       AST 11       Baso # 0.03       Basophil % 0.4       BILIRUBIN TOTAL 0.2  Comment: For infants and newborns, interpretation of results should be based  on gestational age, weight and in agreement with clinical  observations.    Premature Infant recommended reference ranges:  Up to 24 hours.............<8.0 mg/dL  Up to 48 hours............<12.0 mg/dL  3-5 days..................<15.0 mg/dL  6-29 days.................<15.0 mg/dL         BUN 13       Calcium 9.5       Chloride 109       CO2 26       Creatinine 1.0       Differential Method Automated       eGFR >60.0       Eos # 0.2       Eos % 2.3       Glucose  124       Gran # (ANC) 5.4       Gran % 65.9       Hematocrit 30.4       Hemoglobin 9.6       Immature Grans (Abs) 0.03  Comment: Mild elevation in immature granulocytes is non specific and   can be seen in a variety of conditions including stress response,   acute inflammation, trauma and pregnancy. Correlation with other   laboratory and clinical findings is essential.         Immature Granulocytes 0.4       Lymph # 1.9       Lymph % 23.7       Magnesium  1.7       MCH 30.0       MCHC 31.6       MCV 95       Mono # 0.6       Mono % 7.3       MPV 9.1       nRBC 0       Platelet Count 211       Potassium 3.7       PROTEIN TOTAL 5.6       RBC 3.20       RDW 14.8       Sodium 142       TSH 3.803       WBC 8.18               Significant Imaging: I have reviewed all pertinent imaging results/findings within the past 24 hours.     Assessment/Plan:      Lumbar radiculopathy  Resume pain meds and gabapentin, robaxin  Follow up with [facility]     Hypothyroidism due to acquired atrophy of thyroid    Resume synthroid  Ordered TSH with ongoing diarrhea - WNL      Pancreatic insufficiency  Continue Creon and stool bulking agent  GI on board, appreciate their recommendations      Hypomagnesemia  Patient has Abnormal Magnesium: hypomagnesemia. Will continue to monitor electrolytes closely. Will replace the affected electrolytes and repeat labs to be done after interventions completed. The patient's magnesium results have been reviewed and are listed below.  Recent Labs   Lab 09/27/24  0258   MG 1.7     Repleted    History of pancreatitis  On cholestyramine and Creon - she reports not helpful  Abdominal pain increased before presentation, CT abdomen and pelvis without acute pathology   Tolerating solid diet   Had gaseous distention, bowel sounds intact, simethicone added  Pain control  IVF  GI consulted  Endoscopy 9/26 showed gastritis, on PPI    Gastritis  -presented with abdominal pain  -GI on board  -endoscopy  9/26 showing gastritis   -biopsy sent  -tolerating solid diet  -Protonix daily (we will check for C diff as there will be increased risk) and Carafate trial as per GI        Diarrhea, chronic  - presented with abdominal pain  -has chronic diarrhea   -has recurrent C diff infections in the past   -recently used antibiotics-Augmentin for 10 days   -repeat C diff testing as she also mentioned that her stools are always foul-smelling  -no fever, no white count  -over the last 24 hours, had 9 episodes of loose stools  -reached out to GI 9/27 possible need of flexible sigmoidoscopy, awaiting recommendations.  we will touch base tomorrow again.        VTE Risk Mitigation (From admission, onward)           Ordered     enoxaparin injection 40 mg  Daily         09/24/24 2304     IP VTE HIGH RISK PATIENT  Once         09/24/24 2304     Place sequential compression device  Until discontinued         09/24/24 2304                    Discharge Planning   JOSE ANTONIO: 9/29/2024     Code Status: Full Code   Is the patient medically ready for discharge?:     Reason for patient still in hospital (select all that apply): Patient trending condition and Treatment  Discharge Plan A: Home                  Varun Hua MD  Department of Hospital Medicine   Count includes the [facility]

## 2024-09-28 NOTE — PLAN OF CARE
Discharge orders and chart reviewed. No other discharge needs noted at this time. Pt is clear for discharge from case management. Pt is discharging to home.    No PCP appointments available in Epic - inbasket message sent to PCP clinic requesting follow up appointment. [employer] message sent to Dr. Metz's clinic requesting follow up appointment, clinic to contact patient.     Patient to drive self home     09/28/24 1321   Final Note   Assessment Type Final Discharge Note   Anticipated Discharge Disposition Home   What phone number can be called within the next 1-3 days to see how you are doing after discharge? [phone]   Hospital Resources/Appts/Education Provided Appointment suggestion unavailable   Post-Acute Status   Discharge Delays None known at this time

## 2024-09-28 NOTE — DISCHARGE SUMMARY
Replaced by [facility]  Discharge Summary      Patient Name: Alessia Nelson  MRN: [id number]  SURY: [id number]  Patient Class: IP- Inpatient  Admission Date: 9/24/2024  Hospital Length of Stay: 3 days  Discharge Date and Time:  09/28/2024 1:21 PM  Attending Physician: Yousif Munoz DO   Discharging Provider: Yousif Munoz DO  Primary Care Provider: Luana Calvert MD    Primary Care Team: Networked reference to record PCT     HPI:   The patient is a 68-year-old female with past medical history of CKD stage 3b, CAD, recurrent C diff, chronic pancreatitis, pancreatic insufficiency, chronic diarrhea, status post right hemicolectomy due to complication from abdominal surgery, GERD, hypertension, hyperlipidemia, COPD who presented to the ED  for the evaluation of abdominal pain.    The patient reports that she started having abdominal pain since Saturday.  She reports that she started having pain in the lower part of the abdomen at 1st.  She also reports that she started getting pain in the back which slowly progressed to the front of the abdomen.  She complains of nausea but no vomiting. She reports that she has not passed flatus today but had bowel movement this morning.  She reports that she has history of chronic pancreatitis.  She reports that she has chronic diarrhea and is on colestipol and pancreatic enzyme for that.  She denies any fever, chills, cough, chest pain, shortness of breath.  She complains of bilateral swelling of lower extremities.  She denies any orthopnea, PND.  She reports that she has mild COPD and quit smoking about 2 years ago.      Procedure(s) (LRB):  EGD (ESOPHAGOGASTRODUODENOSCOPY) (N/A)      Hospital Course:   Patient admitted with abdominal pain and ongoing diarrhea despite stool bulking agent and Creon. She feels like this is another pancreatitis attack like previous ones. CT imaging did not show acute findings regarding the pancreas. Possible  cystitis may explain the lower abdominal pain and started on Rocephin. UA pending. Giovana zhao has chronic pain from an ortho standpoint with right knee replacement and thoracic and lumbar spine disease for which she goes to [facility].   Today, pain reported 10/10 despite medications given. GI consulted.  Endoscopy planned for 9/26.  Patient complained of multiple episodes of diarrhea, with being on Augmentin for 10 days recently, she also mentioned they were foul smelling, C diff testing was ordered but her stool was semi formed and lab canceled.   For gaseous distention, simethicone was ordered in addition to dicyclomine for possible IBS.  Follow up with GI for further recommendations.     Goals of Care Treatment Preferences:  Code Status: Full Code      SDOH Screening:  The patient was screened for utility difficulties, food insecurity, transport difficulties, housing insecurity, and interpersonal safety and there were no concerns identified this admission.     Consults:   Consults (From admission, onward)          Status Ordering Provider     Inpatient consult to Gastroenterology  Once        Provider:  Lillian Abel MD    Completed TACOS NGUYEN            No new Assessment & Plan notes have been filed under this hospital service since the last note was generated.  Service: Hospital Medicine    Final Active Diagnoses:    Diagnosis Date Noted POA    PRINCIPAL PROBLEM:  Diarrhea, chronic [R19.7] 12/23/2013 Yes    Irritable bowel syndrome with diarrhea [K58.0] 09/28/2024 Yes    Lumbar radiculopathy [M54.16] 03/14/2023 Yes    Hypothyroidism due to acquired atrophy of thyroid [E03.4] 03/24/2022 Yes    Pancreatic insufficiency [K86.89] 01/04/2021 Yes     Chronic    History of pancreatitis [Z87.19] 07/24/2019 Not Applicable    Gastritis [K29.70] 05/16/2019 Yes      Problems Resolved During this Admission:    Diagnosis Date Noted Date Resolved POA    Hypomagnesemia [E83.42] 03/05/2020 09/28/2024  Yes       Discharged Condition: fair    Disposition: Home or Self Care    Follow Up:   Follow-up Information       Sheree Metz MD Follow up.    Specialties: Gastroenterology, Internal Medicine  Why: Per Dr. Saldivar, please follow up with Dr. Metz for GI [facility] message sent to Dr. Metz's clinic requesting follow up appointment. Clinic to contact patient with appointment date/time  Contact information:  [address] [facility]  [address]  [place] [address] [phone]               Luana Calvert MD Follow up.    Specialty: Family Medicine  Why: no appointments available in [facility] message sent to PCP clinic requesting hospital follow up appointment. Clinic to contact patient with appointment date/time.  Contact information:  [address] [place]  [place] [address] [phone]                           Patient Instructions:      Ambulatory referral/consult to Outpatient Case Management   Referral Priority: Routine Referral Type: Consultation   Referral Reason: Specialty Services Required   Number of Visits Requested: 1     Notify your health care provider if you experience any of the following:  temperature >100.4     Notify your health care provider if you experience any of the following:  persistent nausea and vomiting or diarrhea     Notify your health care provider if you experience any of the following:  severe uncontrolled pain     Notify your health care provider if you experience any of the following:  persistent dizziness, light-headedness, or visual disturbances     Activity as tolerated       Significant Diagnostic Studies: Labs: BMP:   Recent Labs   Lab 09/27/24 0258   *      K 3.7      CO2 26   BUN 13   CREATININE 1.0   CALCIUM 9.5   MG 1.7   , CMP   Recent Labs   Lab 09/27/24  0258      K 3.7      CO2 26   *   BUN 13   CREATININE 1.0   CALCIUM 9.5   PROT 5.6*   ALBUMIN 3.5   BILITOT 0.2   ALKPHOS 73   AST 11   ALT 9*   ANIONGAP 7*   , CBC  "  Recent Labs   Lab 09/27/24  0258   WBC 8.18   HGB 9.6*   HCT 30.4*      , INR No results found for: "INR", "PROTIME", Lipid Panel   Lab Results   Component Value Date    CHOL 131 05/20/2024    HDL 48 05/20/2024    LDLCALC 27.8 (L) 05/20/2024    TRIG 276 (H) 05/20/2024    CHOLHDL 36.6 05/20/2024   , Troponin No results for input(s): "TROPONINI" in the last 168 hours., and A1C: No results for input(s): "HGBA1C" in the last 4320 hours.  Radiology:   Imaging Results              CT Abdomen Pelvis With IV Contrast Routine Oral Contrast (Final result)  Result time 09/25/24 01:02:57      Final result by Andrey Domínguez MD (09/25/24 01:02:57)                   Impression:      The liver appears enlarged and there is diminished attenuation consistent with diffuse fatty infiltrate.    Hypodensity of the right kidney noted, cannot be characterized as a cyst, ultrasound follow-up is recommended.    Borderline ectasia/aneurysmal dilatation of the abdominal aorta, follow-up is recommended.    Perivesicular haziness, correlation for UTI/cystitis is needed.    Additional findings as above.      Electronically signed by: Andrey Domínguez  Date:    09/25/2024  Time:    01:02               Narrative:    EXAMINATION:  CT ABDOMEN PELVIS WITH IV CONTRAST    CLINICAL HISTORY:  Generalized abdominal pain;    TECHNIQUE:  Low dose axial images, sagittal and coronal reformations were obtained from the lung bases to the pubic symphysis following the IV administration of 100 mL of Omnipaque 350 oral contrast was not utilized.  Single phase postcontrast CT examination of the abdomen and pelvis is submitted.    COMPARISON:  CT examination of the abdomen and pelvis May 20, 2024    FINDINGS:  The visualized lung bases demonstrate mild atelectatic change.  The stomach demonstrates nonspecific appearance of moderate distention with fluid, air and ingested material.  The gallbladder is surgically absent.  Diminished attenuation of the liver " consistent with diffuse fatty infiltrate is noted.  The liver appears prominent.  There is no evidence for peripancreatic inflammatory change.  There is no evidence for acute process of the spleen or adrenal glands.  The left adrenal gland appears small.    There is a hypodensity at the lower pole of the right kidney measuring approximately 1.6 cm, 27 Hounsfield units cannot be characterized as a cyst, ultrasound may be helpful for further evaluation.  Additional tiny renal hypodensities are noted, too small for characterization.  There is no evidence for ureteral calculus or obstructive uropathy bilaterally.    The arterial vascular structures including the aorta demonstrate atherosclerotic change.  There is dilatation of the infrarenal abdominal aorta measuring up to 2.9 cm, borderline ectasia/aneurysmal dilatation, follow-up is recommended.    Mild perivesicular haziness is noted, correlation for UTI/cystitis is needed.  The patient appears status post hysterectomy.    There is no evidence for small bowel obstructive process.  Mild air and stool noted throughout the colon.  There are areas of fat density associated with the colon that may relate to areas of submucosal fatty infiltrate and or colonic lipomas without obstructive change.  There is no evidence for colonic inflammatory or obstructive change.  The appendix is not identified.  There is no evidence for free intraperitoneal air.    The osseous structures demonstrate chronic and postoperative change, postoperative change of the spine with associated hardware and artifact noted.                                       X-Ray Chest PA And Lateral (Final result)  Result time 09/24/24 15:48:17      Final result by Louis Nj MD (09/24/24 15:48:17)                   Impression:      1. No acute radiographic abnormalities.      Electronically signed by: Louis Nj  Date:    09/24/2024  Time:    15:48               Narrative:    EXAMINATION:  XR  CHEST PA AND LATERAL    CLINICAL HISTORY:  Chest Pain;    COMPARISON:  January 2024    FINDINGS:  Heart size is normal.  The mediastinum is unremarkable.  No infiltrates or effusions are identified.  No acute osseous abnormality is demonstrated.  Changes of anterior instrumented fusion are noted in the lower cervical spine.                                       US Abdomen Limited (Final result)  Result time 09/24/24 15:38:52      Final result by Cleveland Figueroa MD (09/24/24 15:38:52)                   Impression:      1.  Background hepatic parenchymal findings suggesting steatosis.    2.  Prior cholecystectomy with no intra/extrahepatic biliary ductal dilation.    3.  Incidental small cyst in the lower pole the right kidney.    .      Electronically signed by: Cleveland Figueora  Date:    09/24/2024  Time:    15:38               Narrative:    CLINICAL HISTORY:  ([id number])67 y/o  (1955) F    Upper abdominal pain;    TECHNIQUE:  (A#[id number], exam time 9/24/2024 15:34)    US ABDOMEN LIMITED [id number]    Right upper quadrant ultrasound, images obtained in grayscale and color. Additional Doppler images of the portal vein were obtained.    COMPARISON:  Ultrasound from 03/19/2020.    FINDINGS:  The LIVER is mildly prominent at 17.1 cm (sagittal right lobe). Hepatic parenchyma has increased echogenicity with poor delineation of the periportal triads. No definite intrahepatic masses are noted. The portal vein is patent with hepatopetal flow .    The BILIARY SYSTEM is normal in caliber; the common hepatic duct measures 5 mm.  The GALLBLADDER is surgically absent.    The PANCREATIC head and body are partially visualized but grossly normal in appearance. The pancreatic duct is not dilated.    The right KIDNEY is normal in size at 10.4 x 5.1 x 4.6 cm and echogenicity/texture.  There is a 16 mm simple cyst in the inferior pole the right kidney.  No stones or hydronephrosis seen. No solid masses are noted RUQ    The AORTA  and IVC are partially visualized and unremarkable.                                        Pending Diagnostic Studies:       None           Medications:  Reconciled Home Medications:      Medication List        START taking these medications      aluminum-magnesium hydroxide-simethicone 200-200-20 mg/5 mL Susp  Commonly known as: MAALOX  Take 30 mLs by mouth 4 (four) times daily as needed.     dicyclomine 20 mg tablet  Commonly known as: BENTYL  Take 1 tablet (20 mg total) by mouth every 6 (six) hours as needed (diarrhea).            CONTINUE taking these medications      acetaminophen 325 MG tablet  Commonly known as: TYLENOL  Take 2 tablets (650 mg total) by mouth every 6 (six) hours as needed for Pain (Do not take with any other tylenol containing products).     allopurinoL 300 MG tablet  Commonly known as: ZYLOPRIM  TAKE 1 TABLET DAILY     busPIRone 5 MG Tab  Commonly known as: BUSPAR  Take 1 tablet (5 mg total) by mouth 2 (two) times daily.     calcium citrate-vitamin D3 315-200 mg 315 mg-5 mcg (200 unit) per tablet  Commonly known as: CITRACAL+D  Take 1 tablet by mouth once daily.     clopidogreL 75 mg tablet  Commonly known as: PLAVIX  Take 1 tablet (75 mg total) by mouth once daily.     colestipoL 1 gram Tab  Commonly known as: COLESTID  Take 2 tablets (2 g total) by mouth once daily.     cyanocobalamin 1,000 mcg/mL injection  INJECT 1 ML IN THE MUSCLE EVERY 14 DAYS     EScitalopram oxalate 20 MG tablet  Commonly known as: LEXAPRO  TAKE 1 TABLET EVERY EVENING     fluticasone propionate 50 mcg/actuation nasal spray  Commonly known as: FLONASE  2 sprays (100 mcg total) by Each Nostril route 2 (two) times daily.     furosemide 20 MG tablet  Commonly known as: LASIX  TAKE 1 TABLET(20 MG) BY MOUTH EVERY DAY     gabapentin 400 MG capsule  Commonly known as: NEURONTIN  Take 1 capsule (400 mg total) by mouth 2 (two) times daily.     levothyroxine 75 MCG tablet  Commonly known as: SYNTHROID  TAKE 1 TABLET DAILY      magnesium oxide 400 mg (241.3 mg magnesium) tablet  Commonly known as: MAG-OX  Take 1 tablet (400 mg total) by mouth once daily.     methocarbamoL 750 MG Tab  Commonly known as: ROBAXIN  TAKE 1 TABLET EVERY 8 HOURS AS NEEDED FOR MUSCLE SPASMS     metoprolol succinate 50 MG 24 hr tablet  Commonly known as: TOPROL-XL  Take 1 tablet (50 mg total) by mouth once daily. Pt takes 1/2 tab daily     pantoprazole 40 MG tablet  Commonly known as: PROTONIX  TAKE 1 TABLET DAILY     PRESERVISION AREDS ORAL  Take 1 capsule by mouth 2 (two) times a day.     rosuvastatin 10 MG tablet  Commonly known as: CRESTOR  Take 1 tablet (10 mg total) by mouth once daily.     traZODone 100 MG tablet  Commonly known as: DESYREL  TAKE 1 TABLET EVERY EVENING     vitamin D 1000 units Tab  Commonly known as: VITAMIN D3  Take 1,000 Units by mouth once daily.     ZENPEP 40,000-126,000- 168,000 unit Cpdr  Generic drug: lipase-protease-amylase  Take 1 capsule by mouth 3 (three) times daily after meals.            STOP taking these medications      AREXVY (PF) 120 mcg/0.5 mL Susr vaccine  Generic drug: RSVPreF3 antigen-AS01E (PF)     azelastine 137 mcg (0.1 %) nasal spray  Commonly known as: ASTELIN     colchicine 0.6 mg tablet  Commonly known as: COLCRYS     HYDROcodone-acetaminophen  mg per tablet  Commonly known as: NORCO     mupirocin 2 % ointment  Commonly known as: BACTROBAN     ondansetron 4 MG Tbdl  Commonly known as: ZOFRAN-ODT     potassium chloride SA 20 MEQ tablet  Commonly known as: KLOR-CON M20     SHINGRIX (PF) 50 mcg/0.5 mL injection  Generic drug: varicella-zoster gE-AS01B (PF)              Indwelling Lines/Drains at time of discharge:   Lines/Drains/Airways       None                   Time spent on the discharge of patient: 33 minutes         Yousif Munoz DO  Department of Hospital Medicine  [facility]

## 2024-09-28 NOTE — ASSESSMENT & PLAN NOTE
On cholestyramine and Creon - she reports not helpful  Abdominal pain increased before presentation, CT abdomen and pelvis without acute pathology   Tolerating solid diet   Had gaseous distention, bowel sounds intact, simethicone added  Pain control  IVF  GI consulted  Endoscopy 9/26 showed gastritis, on PPI

## 2024-09-28 NOTE — PLAN OF CARE
POC reviewed with patient this shift.  A/O x4.  Respirations unlabored on RA.  Skin w/d.  Continent of b/b.  X1 BM noted this shift but unable to be collected.  As per pt stool slightly loose but some formed stool noted.  Ambulates to restroom without difficulty.  Back/ABD pain continues but being managed with around the clock dosing.  Tolerates meds whole with water without difficulty.  VSS.  See flowsheet for full assessment.  Able to verbalize wants/needs.  No s/s of distress.  Fall/safety precautions maintained.      Problem: Adult Inpatient Plan of Care  Goal: Plan of Care Review  Outcome: Progressing     Problem: Infection  Goal: Absence of Infection Signs and Symptoms  Outcome: Progressing     Problem: Fall Injury Risk  Goal: Absence of Fall and Fall-Related Injury  Outcome: Progressing

## 2024-09-28 NOTE — NURSING
Pt requesting Robaxin. Pt states she normally take Methocarbamol TID PRN but hasn't gotten it since admit. It does look like it is listed on home meds and MD progress note states to resume med but it isn't listed on the MAR.  Team messaged to notify.  Awaiting response.

## 2024-09-28 NOTE — ASSESSMENT & PLAN NOTE
Patient has Abnormal Magnesium: hypomagnesemia. Will continue to monitor electrolytes closely. Will replace the affected electrolytes and repeat labs to be done after interventions completed. The patient's magnesium results have been reviewed and are listed below.  Recent Labs   Lab 09/27/24  0258   MG 1.7     Repleted

## 2024-09-28 NOTE — SUBJECTIVE & OBJECTIVE
Interval History:  Over the last 24 hours she had 9 episodes of loose stools.  C diff sample could not be sent as it did not fit the C diff protocol criteria, hence the order had to be canceled by the lab.  She complained of gaseous distention this morning, simethicone ordered.        Review of Systems   Constitutional:  Positive for appetite change.   Respiratory:  Negative for shortness of breath.    Cardiovascular: Negative.    Gastrointestinal:  Positive for abdominal distention and diarrhea. Negative for abdominal pain, nausea and vomiting.        Mentions that is foul smelling, and has history of C diff in the past   Genitourinary:  Negative for pelvic pain.   Musculoskeletal:  Positive for arthralgias and back pain.   Skin: Negative.    Neurological: Negative.    Psychiatric/Behavioral: Negative.       Objective:     Vital Signs (Most Recent):  Temp: 98.8 °F (37.1 °C) (09/27/24 2004)  Pulse: 66 (09/27/24 2004)  Resp: 15 (09/27/24 2004)  BP: (!) 164/85 (09/27/24 2004)  SpO2: 97 % (09/27/24 2004) Vital Signs (24h Range):  Temp:  [98 °F (36.7 °C)-98.8 °F (37.1 °C)] 98.8 °F (37.1 °C)  Pulse:  [65-72] 66  Resp:  [15-18] 15  SpO2:  [95 %-97 %] 97 %  BP: (136-186)/(66-85) 164/85     Weight: 86.2 kg (190 lb 0.6 oz)  Body mass index is 29.76 kg/m².    Intake/Output Summary (Last 24 hours) at 9/27/2024 2324  Last data filed at 9/27/2024 2040  Gross per 24 hour   Intake 360 ml   Output 1 ml   Net 359 ml         Physical Exam  Constitutional:       General: She is not in acute distress.  HENT:      Head: Normocephalic and atraumatic.      Mouth/Throat:      Mouth: Mucous membranes are moist.      Pharynx: Oropharynx is clear.   Cardiovascular:      Rate and Rhythm: Normal rate and regular rhythm.   Pulmonary:      Effort: Pulmonary effort is normal. No respiratory distress.      Breath sounds: No wheezing.   Abdominal:      General: There is distension.      Tenderness: There is no abdominal tenderness. There is no right  CVA tenderness, left CVA tenderness, guarding or rebound.      Hernia: A hernia is present.   Musculoskeletal:         General: No swelling or tenderness.      Cervical back: Normal range of motion and neck supple.   Skin:     General: Skin is warm.   Neurological:      General: No focal deficit present.      Mental Status: She is alert and oriented to person, place, and time. Mental status is at baseline.   Psychiatric:         Mood and Affect: Mood normal.         Behavior: Behavior normal.           Significant Labs: All pertinent labs within the past 24 hours have been reviewed.  Recent Lab Results         09/27/24  0258        Albumin 3.5       ALP 73       ALT 9       Anion Gap 7       AST 11       Baso # 0.03       Basophil % 0.4       BILIRUBIN TOTAL 0.2  Comment: For infants and newborns, interpretation of results should be based  on gestational age, weight and in agreement with clinical  observations.    Premature Infant recommended reference ranges:  Up to 24 hours.............<8.0 mg/dL  Up to 48 hours............<12.0 mg/dL  3-5 days..................<15.0 mg/dL  6-29 days.................<15.0 mg/dL         BUN 13       Calcium 9.5       Chloride 109       CO2 26       Creatinine 1.0       Differential Method Automated       eGFR >60.0       Eos # 0.2       Eos % 2.3       Glucose 124       Gran # (ANC) 5.4       Gran % 65.9       Hematocrit 30.4       Hemoglobin 9.6       Immature Grans (Abs) 0.03  Comment: Mild elevation in immature granulocytes is non specific and   can be seen in a variety of conditions including stress response,   acute inflammation, trauma and pregnancy. Correlation with other   laboratory and clinical findings is essential.         Immature Granulocytes 0.4       Lymph # 1.9       Lymph % 23.7       Magnesium  1.7       MCH 30.0       MCHC 31.6       MCV 95       Mono # 0.6       Mono % 7.3       MPV 9.1       nRBC 0       Platelet Count 211       Potassium 3.7       PROTEIN  TOTAL 5.6       RBC 3.20       RDW 14.8       Sodium 142       TSH 3.803       WBC 8.18               Significant Imaging: I have reviewed all pertinent imaging results/findings within the past 24 hours.

## 2024-09-28 NOTE — NURSING
Patient with BP of 191/81, Mean 116, HR 68.  Dr. Munoz notified.  States to get another BP in 30 minutes.

## 2024-09-28 NOTE — ASSESSMENT & PLAN NOTE
- presented with abdominal pain  -has chronic diarrhea   -has recurrent C diff infections in the past   -recently used antibiotics-Augmentin for 10 days   -repeat C diff testing as she also mentioned that her stools are always foul-smelling  -no fever, no white count  -over the last 24 hours, had 9 episodes of loose stools  -reached out to GI 9/27 possible need of flexible sigmoidoscopy, awaiting recommendations.  we will touch base tomorrow again.

## 2024-09-28 NOTE — ASSESSMENT & PLAN NOTE
-presented with abdominal pain  -GI on board  -endoscopy 9/26 showing gastritis   -biopsy sent  -tolerating solid diet  -Protonix daily (we will check for C diff as there will be increased risk) and Carafate trial as per GI

## 2024-09-29 LAB
ANION GAP SERPL CALC-SCNC: 8 MMOL/L (ref 8–16)
BASOPHILS # BLD AUTO: 0.05 K/UL (ref 0–0.2)
BASOPHILS NFR BLD: 0.6 % (ref 0–1.9)
BUN SERPL-MCNC: 13 MG/DL (ref 8–23)
CALCIUM SERPL-MCNC: 10 MG/DL (ref 8.7–10.5)
CHLORIDE SERPL-SCNC: 108 MMOL/L (ref 95–110)
CO2 SERPL-SCNC: 26 MMOL/L (ref 23–29)
CREAT SERPL-MCNC: 0.8 MG/DL (ref 0.5–1.4)
DIFFERENTIAL METHOD BLD: ABNORMAL
EOSINOPHIL # BLD AUTO: 0.3 K/UL (ref 0–0.5)
EOSINOPHIL NFR BLD: 3 % (ref 0–8)
ERYTHROCYTE [DISTWIDTH] IN BLOOD BY AUTOMATED COUNT: 14.9 % (ref 11.5–14.5)
EST. GFR  (NO RACE VARIABLE): >60 ML/MIN/1.73 M^2
GLUCOSE SERPL-MCNC: 103 MG/DL (ref 70–110)
HCT VFR BLD AUTO: 35.1 % (ref 37–48.5)
HGB BLD-MCNC: 10.8 G/DL (ref 12–16)
IMM GRANULOCYTES # BLD AUTO: 0.03 K/UL (ref 0–0.04)
IMM GRANULOCYTES NFR BLD AUTO: 0.3 % (ref 0–0.5)
LYMPHOCYTES # BLD AUTO: 2.1 K/UL (ref 1–4.8)
LYMPHOCYTES NFR BLD: 24 % (ref 18–48)
MCH RBC QN AUTO: 29.1 PG (ref 27–31)
MCHC RBC AUTO-ENTMCNC: 30.8 G/DL (ref 32–36)
MCV RBC AUTO: 95 FL (ref 82–98)
MONOCYTES # BLD AUTO: 0.6 K/UL (ref 0.3–1)
MONOCYTES NFR BLD: 7 % (ref 4–15)
NEUTROPHILS # BLD AUTO: 5.7 K/UL (ref 1.8–7.7)
NEUTROPHILS NFR BLD: 65.1 % (ref 38–73)
NRBC BLD-RTO: 0 /100 WBC
PLATELET # BLD AUTO: 259 K/UL (ref 150–450)
PMV BLD AUTO: 9 FL (ref 9.2–12.9)
POTASSIUM SERPL-SCNC: 4.1 MMOL/L (ref 3.5–5.1)
RBC # BLD AUTO: 3.71 M/UL (ref 4–5.4)
SODIUM SERPL-SCNC: 142 MMOL/L (ref 136–145)
WBC # BLD AUTO: 8.72 K/UL (ref 3.9–12.7)

## 2024-09-29 PROCEDURE — 80048 BASIC METABOLIC PNL TOTAL CA: CPT | Performed by: FAMILY MEDICINE

## 2024-09-29 PROCEDURE — 25000003 PHARM REV CODE 250

## 2024-09-29 PROCEDURE — 12000002 HC ACUTE/MED SURGE SEMI-PRIVATE ROOM

## 2024-09-29 PROCEDURE — 63600175 PHARM REV CODE 636 W HCPCS: Performed by: STUDENT IN AN ORGANIZED HEALTH CARE EDUCATION/TRAINING PROGRAM

## 2024-09-29 PROCEDURE — 25000003 PHARM REV CODE 250: Performed by: STUDENT IN AN ORGANIZED HEALTH CARE EDUCATION/TRAINING PROGRAM

## 2024-09-29 PROCEDURE — 85025 COMPLETE CBC W/AUTO DIFF WBC: CPT | Performed by: FAMILY MEDICINE

## 2024-09-29 PROCEDURE — 36415 COLL VENOUS BLD VENIPUNCTURE: CPT | Performed by: FAMILY MEDICINE

## 2024-09-29 PROCEDURE — 25000003 PHARM REV CODE 250: Performed by: FAMILY MEDICINE

## 2024-09-29 RX ORDER — AMLODIPINE BESYLATE 5 MG/1
5 TABLET ORAL DAILY
Qty: 30 TABLET | Refills: 0 | Status: SHIPPED | OUTPATIENT
Start: 2024-09-30

## 2024-09-29 RX ADMIN — MORPHINE SULFATE 2 MG: 2 INJECTION, SOLUTION INTRAMUSCULAR; INTRAVENOUS at 02:09

## 2024-09-29 RX ADMIN — METOPROLOL SUCCINATE 25 MG: 25 TABLET, EXTENDED RELEASE ORAL at 09:09

## 2024-09-29 RX ADMIN — GABAPENTIN 400 MG: 300 CAPSULE ORAL at 09:09

## 2024-09-29 RX ADMIN — BUSPIRONE HYDROCHLORIDE 5 MG: 5 TABLET ORAL at 09:09

## 2024-09-29 RX ADMIN — ESCITALOPRAM OXALATE 20 MG: 10 TABLET ORAL at 09:09

## 2024-09-29 RX ADMIN — CHOLESTYRAMINE 4 GRAMS OF ANHYDROUS CHOLESTYRAMINE: 4 POWDER, FOR SUSPENSION ORAL at 09:09

## 2024-09-29 RX ADMIN — MORPHINE SULFATE 2 MG: 2 INJECTION, SOLUTION INTRAMUSCULAR; INTRAVENOUS at 11:09

## 2024-09-29 RX ADMIN — METHOCARBAMOL TABLETS 500 MG: 500 TABLET, COATED ORAL at 09:09

## 2024-09-29 RX ADMIN — ALLOPURINOL 300 MG: 300 TABLET ORAL at 09:09

## 2024-09-29 RX ADMIN — AMLODIPINE BESYLATE 5 MG: 5 TABLET ORAL at 09:09

## 2024-09-29 RX ADMIN — TRAZODONE HYDROCHLORIDE 100 MG: 50 TABLET ORAL at 09:09

## 2024-09-29 RX ADMIN — LEVOTHYROXINE SODIUM 75 MCG: 0.03 TABLET ORAL at 09:09

## 2024-09-29 RX ADMIN — PANCRELIPASE 3 CAPSULE: 60000; 12000; 38000 CAPSULE, DELAYED RELEASE PELLETS ORAL at 09:09

## 2024-09-29 RX ADMIN — ACETAMINOPHEN 650 MG: 325 TABLET ORAL at 08:09

## 2024-09-29 RX ADMIN — PANTOPRAZOLE SODIUM 40 MG: 40 TABLET, DELAYED RELEASE ORAL at 09:09

## 2024-09-29 RX ADMIN — CLOPIDOGREL BISULFATE 75 MG: 75 TABLET, FILM COATED ORAL at 09:09

## 2024-09-29 RX ADMIN — ATORVASTATIN CALCIUM 20 MG: 20 TABLET, FILM COATED ORAL at 09:09

## 2024-09-29 RX ADMIN — PANCRELIPASE 3 CAPSULE: 60000; 12000; 38000 CAPSULE, DELAYED RELEASE PELLETS ORAL at 05:09

## 2024-09-29 RX ADMIN — ENOXAPARIN SODIUM 40 MG: 40 INJECTION SUBCUTANEOUS at 05:09

## 2024-09-29 RX ADMIN — SIMETHICONE 80 MG: 80 TABLET, CHEWABLE ORAL at 08:09

## 2024-09-29 RX ADMIN — PANCRELIPASE 3 CAPSULE: 60000; 12000; 38000 CAPSULE, DELAYED RELEASE PELLETS ORAL at 01:09

## 2024-09-29 RX ADMIN — MORPHINE SULFATE 2 MG: 2 INJECTION, SOLUTION INTRAMUSCULAR; INTRAVENOUS at 09:09

## 2024-09-29 RX ADMIN — ONDANSETRON 4 MG: 2 INJECTION INTRAMUSCULAR; INTRAVENOUS at 02:09

## 2024-09-29 NOTE — PROGRESS NOTES
[facility]  Progress Note    Patient Name: Alessia Nelson  MRN: [id number]  Patient Class: IP- Inpatient   Admission Date: 9/24/2024  Length of Stay: 4 days  Attending Physician: Yousif Munoz DO  Primary Care Provider: Luana Calvert MD        Subjective:     Principal Problem:Diarrhea        HPI:  The patient is a 68-year-old female with past medical history of CKD stage 3b, CAD, recurrent C diff, chronic pancreatitis, pancreatic insufficiency, chronic diarrhea, status post right hemicolectomy due to complication from abdominal surgery, GERD, hypertension, hyperlipidemia, COPD who presented to the ED  for the evaluation of abdominal pain.    The patient reports that she started having abdominal pain since Saturday.  She reports that she started having pain in the lower part of the abdomen at 1st.  She also reports that she started getting pain in the back which slowly progressed to the front of the abdomen.  She complains of nausea but no vomiting. She reports that she has not passed flatus today but had bowel movement this morning.  She reports that she has history of chronic pancreatitis.  She reports that she has chronic diarrhea and is on colestipol and pancreatic enzyme for that.  She denies any fever, chills, cough, chest pain, shortness of breath.  She complains of bilateral swelling of lower extremities.  She denies any orthopnea, PND.  She reports that she has mild COPD and quit smoking about 2 years ago.      Overview/Hospital Course:  Patient admitted with abdominal pain and ongoing diarrhea despite stool bulking agent and Creon. She feels like this is another pancreatitis attack like previous ones. CT imaging did not show acute findings regarding the pancreas. Possible cystitis may explain the lower abdominal pain and started on Rocephin. UA pending. Giovana zhao has chronic pain from an ortho standpoint with right knee replacement and thoracic and lumbar spine  disease for which she goes to [facility].   Today, pain reported 10/10 despite medications given. GI consulted.  Endoscopy planned for 9/26.  Patient complained of multiple episodes of diarrhea, with being on Augmentin for 10 days recently, she also mentioned they were foul smelling, C diff testing was ordered but her stool was semi formed and lab canceled.   For gaseous distention, simethicone was ordered in addition to dicyclomine for possible IBS.  Follow up with GI for further recommendations.  Added amlodipine in addition to metoprolol for hypertension.    Interval History:  Patient seen and examined, no acute complaints      Review of Systems   Constitutional:  Negative for appetite change.   Respiratory:  Negative for shortness of breath.    Cardiovascular: Negative.    Gastrointestinal:  Positive for diarrhea. Negative for abdominal distention, abdominal pain, nausea and vomiting.        Mentions that is foul smelling, and has history of C diff in the past   Genitourinary:  Negative for pelvic pain.   Musculoskeletal:  Positive for back pain. Negative for arthralgias.   Skin: Negative.    Neurological: Negative.    Psychiatric/Behavioral: Negative.       Objective:     Vital Signs (Most Recent):  Temp: 98.2 °F (36.8 °C) (09/29/24 1215)  Pulse: 75 (09/29/24 1215)  Resp: 18 (09/29/24 1426)  BP: (!) 173/70 (09/29/24 1215)  SpO2: (!) 94 % (09/29/24 1215) Vital Signs (24h Range):  Temp:  [97.5 °F (36.4 °C)-98.5 °F (36.9 °C)] 98.2 °F (36.8 °C)  Pulse:  [62-81] 75  Resp:  [10-18] 18  SpO2:  [92 %-97 %] 94 %  BP: (144-209)/(63-86) 173/70     Weight: 86.2 kg (190 lb 0.6 oz)  Body mass index is 29.76 kg/m².    Intake/Output Summary (Last 24 hours) at 9/29/2024 1537  Last data filed at 9/29/2024 0912  Gross per 24 hour   Intake 1290 ml   Output --   Net 1290 ml         Physical Exam  Constitutional:       General: She is not in acute distress.  HENT:      Head: Normocephalic and atraumatic.       Mouth/Throat:      Mouth: Mucous membranes are moist.      Pharynx: Oropharynx is clear.   Cardiovascular:      Rate and Rhythm: Normal rate and regular rhythm.   Pulmonary:      Effort: Pulmonary effort is normal. No respiratory distress.      Breath sounds: No wheezing.   Abdominal:      General: There is no distension.      Palpations: Abdomen is soft.      Tenderness: There is no abdominal tenderness. There is no right CVA tenderness, left CVA tenderness, guarding or rebound.      Hernia: A hernia is present.      Comments: Protuberant abdomen   Musculoskeletal:         General: No swelling or tenderness.      Cervical back: Normal range of motion and neck supple.   Skin:     General: Skin is warm.   Neurological:      General: No focal deficit present.      Mental Status: She is alert and oriented to person, place, and time. Mental status is at baseline.   Psychiatric:         Mood and Affect: Mood normal.         Behavior: Behavior normal.             Significant Labs: All pertinent labs within the past 24 hours have been reviewed.  Recent Lab Results         09/29/24  0847   09/29/24  0846        Anion Gap   8       Baso # 0.05         Basophil % 0.6         BUN   13       Calcium   10.0       Chloride   108       CO2   26       Creatinine   0.8       Differential Method Automated         eGFR   >60.0       Eos # 0.3         Eos % 3.0         Glucose   103       Gran # (ANC) 5.7         Gran % 65.1         Hematocrit 35.1         Hemoglobin 10.8         Immature Grans (Abs) 0.03  Comment: Mild elevation in immature granulocytes is non specific and   can be seen in a variety of conditions including stress response,   acute inflammation, trauma and pregnancy. Correlation with other   laboratory and clinical findings is essential.           Immature Granulocytes 0.3         Lymph # 2.1         Lymph % 24.0         MCH 29.1         MCHC 30.8         MCV 95         Mono # 0.6         Mono % 7.0         MPV 9.0          nRBC 0         Platelet Count 259         Potassium   4.1       RBC 3.71         RDW 14.9         Sodium   142       WBC 8.72                 Significant Imaging: I have reviewed all pertinent imaging results/findings within the past 24 hours.  Imaging Results              CT Abdomen Pelvis With IV Contrast Routine Oral Contrast (Final result)  Result time 09/25/24 01:02:57      Final result by Andrey Domínguez MD (09/25/24 01:02:57)                   Impression:      The liver appears enlarged and there is diminished attenuation consistent with diffuse fatty infiltrate.    Hypodensity of the right kidney noted, cannot be characterized as a cyst, ultrasound follow-up is recommended.    Borderline ectasia/aneurysmal dilatation of the abdominal aorta, follow-up is recommended.    Perivesicular haziness, correlation for UTI/cystitis is needed.    Additional findings as above.      Electronically signed by: Andrey Domínguez  Date:    09/25/2024  Time:    01:02               Narrative:    EXAMINATION:  CT ABDOMEN PELVIS WITH IV CONTRAST    CLINICAL HISTORY:  Generalized abdominal pain;    TECHNIQUE:  Low dose axial images, sagittal and coronal reformations were obtained from the lung bases to the pubic symphysis following the IV administration of 100 mL of Omnipaque 350 oral contrast was not utilized.  Single phase postcontrast CT examination of the abdomen and pelvis is submitted.    COMPARISON:  CT examination of the abdomen and pelvis May 20, 2024    FINDINGS:  The visualized lung bases demonstrate mild atelectatic change.  The stomach demonstrates nonspecific appearance of moderate distention with fluid, air and ingested material.  The gallbladder is surgically absent.  Diminished attenuation of the liver consistent with diffuse fatty infiltrate is noted.  The liver appears prominent.  There is no evidence for peripancreatic inflammatory change.  There is no evidence for acute process of the spleen or adrenal  glands.  The left adrenal gland appears small.    There is a hypodensity at the lower pole of the right kidney measuring approximately 1.6 cm, 27 Hounsfield units cannot be characterized as a cyst, ultrasound may be helpful for further evaluation.  Additional tiny renal hypodensities are noted, too small for characterization.  There is no evidence for ureteral calculus or obstructive uropathy bilaterally.    The arterial vascular structures including the aorta demonstrate atherosclerotic change.  There is dilatation of the infrarenal abdominal aorta measuring up to 2.9 cm, borderline ectasia/aneurysmal dilatation, follow-up is recommended.    Mild perivesicular haziness is noted, correlation for UTI/cystitis is needed.  The patient appears status post hysterectomy.    There is no evidence for small bowel obstructive process.  Mild air and stool noted throughout the colon.  There are areas of fat density associated with the colon that may relate to areas of submucosal fatty infiltrate and or colonic lipomas without obstructive change.  There is no evidence for colonic inflammatory or obstructive change.  The appendix is not identified.  There is no evidence for free intraperitoneal air.    The osseous structures demonstrate chronic and postoperative change, postoperative change of the spine with associated hardware and artifact noted.                                       X-Ray Chest PA And Lateral (Final result)  Result time 09/24/24 15:48:17      Final result by Louis Nj MD (09/24/24 15:48:17)                   Impression:      1. No acute radiographic abnormalities.      Electronically signed by: Louis Nj  Date:    09/24/2024  Time:    15:48               Narrative:    EXAMINATION:  XR CHEST PA AND LATERAL    CLINICAL HISTORY:  Chest Pain;    COMPARISON:  January 2024    FINDINGS:  Heart size is normal.  The mediastinum is unremarkable.  No infiltrates or effusions are identified.  No acute  osseous abnormality is demonstrated.  Changes of anterior instrumented fusion are noted in the lower cervical spine.                                       US Abdomen Limited (Final result)  Result time 09/24/24 15:38:52      Final result by Cleveland Figueroa MD (09/24/24 15:38:52)                   Impression:      1.  Background hepatic parenchymal findings suggesting steatosis.    2.  Prior cholecystectomy with no intra/extrahepatic biliary ductal dilation.    3.  Incidental small cyst in the lower pole the right kidney.    .      Electronically signed by: Cleveland Figueroa  Date:    09/24/2024  Time:    15:38               Narrative:    CLINICAL HISTORY:  ([id number])67 y/o  (1955) F    Upper abdominal pain;    TECHNIQUE:  (A#[id number], exam time 9/24/2024 15:34)    US ABDOMEN LIMITED [id number]    Right upper quadrant ultrasound, images obtained in grayscale and color. Additional Doppler images of the portal vein were obtained.    COMPARISON:  Ultrasound from 03/19/2020.    FINDINGS:  The LIVER is mildly prominent at 17.1 cm (sagittal right lobe). Hepatic parenchyma has increased echogenicity with poor delineation of the periportal triads. No definite intrahepatic masses are noted. The portal vein is patent with hepatopetal flow .    The BILIARY SYSTEM is normal in caliber; the common hepatic duct measures 5 mm.  The GALLBLADDER is surgically absent.    The PANCREATIC head and body are partially visualized but grossly normal in appearance. The pancreatic duct is not dilated.    The right KIDNEY is normal in size at 10.4 x 5.1 x 4.6 cm and echogenicity/texture.  There is a 16 mm simple cyst in the inferior pole the right kidney.  No stones or hydronephrosis seen. No solid masses are noted RUQ    The AORTA and IVC are partially visualized and unremarkable.                                        Assessment/Plan:      * Diarrhea, chronic  - presented with abdominal pain  -has chronic diarrhea   -has recurrent C  diff infections in the past   -recently used antibiotics-Augmentin for 10 days   -C diff testing was ordered but her stool was semi formed and lab canceled.      Lumbar radiculopathy  Resume pain meds and gabapentin, robaxin  Follow up with [facility]     Hypothyroidism due to acquired atrophy of thyroid    Resume synthroid  Ordered TSH with ongoing diarrhea - WNL      Pancreatic insufficiency  Continue Creon and stool bulking agent  GI on board, appreciate their recommendations      History of pancreatitis  On cholestyramine and Creon - she reports not helpful  Abdominal pain increased before presentation, CT abdomen and pelvis without acute pathology   Tolerating solid diet   Had gaseous distention, bowel sounds intact, simethicone added  Pain control  IVF  GI consulted  Endoscopy 9/26 showed gastritis, on PPI    Gastritis  -presented with abdominal pain  -GI on board  -endoscopy 9/26 showing gastritis   -biopsy sent  -tolerating solid diet  -Protonix daily (we will check for C diff as there will be increased risk) and Carafate trial as per GI          VTE Risk Mitigation (From admission, onward)           Ordered     enoxaparin injection 40 mg  Daily         09/24/24 2304     IP VTE HIGH RISK PATIENT  Once         09/24/24 2304     Place sequential compression device  Until discontinued         09/24/24 2304                    Discharge Planning   JOSE ANTONIO: 9/28/2024     Code Status: Full Code   Is the patient medically ready for discharge?:     Reason for patient still in hospital (select all that apply): Pending disposition  Discharge Plan A: Home   Discharge Delays: None known at this time              Yousif Munoz DO  Department of Hospital Medicine   [facility]

## 2024-09-29 NOTE — DISCHARGE SUMMARY
[facility]  Discharge Summary      Patient Name: Alessia Nelson  MRN: [id number]  SURY: [id number]  Patient Class: IP- Inpatient  Admission Date: 9/24/2024  Hospital Length of Stay: 4 days  Discharge Date and Time:  09/29/2024 3:39 PM  Attending Physician: Yousif Munoz DO   Discharging Provider: Yousif Munoz DO  Primary Care Provider: Luana Calvert MD    Primary Care Team: Networked reference to record PCT     HPI:   The patient is a 68-year-old female with past medical history of CKD stage 3b, CAD, recurrent C diff, chronic pancreatitis, pancreatic insufficiency, chronic diarrhea, status post right hemicolectomy due to complication from abdominal surgery, GERD, hypertension, hyperlipidemia, COPD who presented to the ED  for the evaluation of abdominal pain.    The patient reports that she started having abdominal pain since Saturday.  She reports that she started having pain in the lower part of the abdomen at 1st.  She also reports that she started getting pain in the back which slowly progressed to the front of the abdomen.  She complains of nausea but no vomiting. She reports that she has not passed flatus today but had bowel movement this morning.  She reports that she has history of chronic pancreatitis.  She reports that she has chronic diarrhea and is on colestipol and pancreatic enzyme for that.  She denies any fever, chills, cough, chest pain, shortness of breath.  She complains of bilateral swelling of lower extremities.  She denies any orthopnea, PND.  She reports that she has mild COPD and quit smoking about 2 years ago.      Procedure(s) (LRB):  EGD (ESOPHAGOGASTRODUODENOSCOPY) (N/A)      Hospital Course:   Patient admitted with abdominal pain and ongoing diarrhea despite stool bulking agent and Creon. She feels like this is another pancreatitis attack like previous ones. CT imaging did not show acute findings regarding the pancreas. Possible  cystitis may explain the lower abdominal pain and started on Rocephin. UA pending. Giovana zhao has chronic pain from an ortho standpoint with right knee replacement and thoracic and lumbar spine disease for which she goes to [facility].   Today, pain reported 10/10 despite medications given. GI consulted.  Endoscopy planned for 9/26.  Patient complained of multiple episodes of diarrhea, with being on Augmentin for 10 days recently, she also mentioned they were foul smelling, C diff testing was ordered but her stool was semi formed and lab canceled.   For gaseous distention, simethicone was ordered in addition to dicyclomine for possible IBS.  Follow up with GI for further recommendations.  Added amlodipine in addition to metoprolol for hypertension.     Goals of Care Treatment Preferences:  Code Status: Full Code      SDOH Screening:  The patient was screened for utility difficulties, food insecurity, transport difficulties, housing insecurity, and interpersonal safety and there were no concerns identified this admission.     Consults:   Consults (From admission, onward)          Status Ordering Provider     Inpatient consult to Gastroenterology  Once        Provider:  Lillian Abel MD    Completed TACOS NGUYEN            No new Assessment & Plan notes have been filed under this hospital service since the last note was generated.  Service: Hospital Medicine    Final Active Diagnoses:    Diagnosis Date Noted POA    PRINCIPAL PROBLEM:  Diarrhea, chronic [R19.7] 12/23/2013 Yes    Irritable bowel syndrome with diarrhea [K58.0] 09/28/2024 Yes    Lumbar radiculopathy [M54.16] 03/14/2023 Yes    Hypothyroidism due to acquired atrophy of thyroid [E03.4] 03/24/2022 Yes    Pancreatic insufficiency [K86.89] 01/04/2021 Yes     Chronic    History of pancreatitis [Z87.19] 07/24/2019 Not Applicable    Gastritis [K29.70] 05/16/2019 Yes      Problems Resolved During this Admission:    Diagnosis Date Noted Date  Resolved POA    Hypomagnesemia [E83.42] 03/05/2020 09/28/2024 Yes       Discharged Condition: fair    Disposition: Home or Self Care    Follow Up:   Follow-up Information       Sheree Metz MD Follow up.    Specialties: Gastroenterology, Internal Medicine  Why: Per Dr. Saldivar, please follow up with Dr. Metz for GI needs - [employer] message sent to Dr. Metz's clinic requesting follow up appointment. Clinic to contact patient with appointment date/time  Contact information:  [address]  [address]  [place] [address]  [phone]               Luana Calvert MD Follow up.    Specialty: Family Medicine  Why: no appointments available in [facility] message sent to PCP clinic requesting hospital follow up appointment. Clinic to contact patient with appointment date/time.  Contact information:  [address] [employer]  [place] [address]  [phone]                           Patient Instructions:      Ambulatory referral/consult to Outpatient Case Management   Referral Priority: Routine Referral Type: Consultation   Referral Reason: Specialty Services Required   Number of Visits Requested: 1     Notify your health care provider if you experience any of the following:  temperature >100.4     Notify your health care provider if you experience any of the following:  persistent nausea and vomiting or diarrhea     Notify your health care provider if you experience any of the following:  severe uncontrolled pain     Notify your health care provider if you experience any of the following:  persistent dizziness, light-headedness, or visual disturbances     Activity as tolerated       Significant Diagnostic Studies: Labs: BMP:   Recent Labs   Lab 09/29/24  0846         K 4.1      CO2 26   BUN 13   CREATININE 0.8   CALCIUM 10.0   , CMP   Recent Labs   Lab 09/29/24  0846      K 4.1      CO2 26      BUN 13   CREATININE 0.8   CALCIUM 10.0   ANIONGAP 8   , CBC   Recent Labs   Lab  "09/29/24  0847   WBC 8.72   HGB 10.8*   HCT 35.1*      , INR No results found for: "INR", "PROTIME", Lipid Panel   Lab Results   Component Value Date    CHOL 131 05/20/2024    HDL 48 05/20/2024    LDLCALC 27.8 (L) 05/20/2024    TRIG 276 (H) 05/20/2024    CHOLHDL 36.6 05/20/2024   , Troponin No results for input(s): "TROPONINI" in the last 168 hours., and A1C: No results for input(s): "HGBA1C" in the last 4320 hours.  Radiology:   Imaging Results              CT Abdomen Pelvis With IV Contrast Routine Oral Contrast (Final result)  Result time 09/25/24 01:02:57      Final result by Andrey Domínguez MD (09/25/24 01:02:57)                   Impression:      The liver appears enlarged and there is diminished attenuation consistent with diffuse fatty infiltrate.    Hypodensity of the right kidney noted, cannot be characterized as a cyst, ultrasound follow-up is recommended.    Borderline ectasia/aneurysmal dilatation of the abdominal aorta, follow-up is recommended.    Perivesicular haziness, correlation for UTI/cystitis is needed.    Additional findings as above.      Electronically signed by: Andrey Domínguez  Date:    09/25/2024  Time:    01:02               Narrative:    EXAMINATION:  CT ABDOMEN PELVIS WITH IV CONTRAST    CLINICAL HISTORY:  Generalized abdominal pain;    TECHNIQUE:  Low dose axial images, sagittal and coronal reformations were obtained from the lung bases to the pubic symphysis following the IV administration of 100 mL of Omnipaque 350 oral contrast was not utilized.  Single phase postcontrast CT examination of the abdomen and pelvis is submitted.    COMPARISON:  CT examination of the abdomen and pelvis May 20, 2024    FINDINGS:  The visualized lung bases demonstrate mild atelectatic change.  The stomach demonstrates nonspecific appearance of moderate distention with fluid, air and ingested material.  The gallbladder is surgically absent.  Diminished attenuation of the liver consistent with " diffuse fatty infiltrate is noted.  The liver appears prominent.  There is no evidence for peripancreatic inflammatory change.  There is no evidence for acute process of the spleen or adrenal glands.  The left adrenal gland appears small.    There is a hypodensity at the lower pole of the right kidney measuring approximately 1.6 cm, 27 Hounsfield units cannot be characterized as a cyst, ultrasound may be helpful for further evaluation.  Additional tiny renal hypodensities are noted, too small for characterization.  There is no evidence for ureteral calculus or obstructive uropathy bilaterally.    The arterial vascular structures including the aorta demonstrate atherosclerotic change.  There is dilatation of the infrarenal abdominal aorta measuring up to 2.9 cm, borderline ectasia/aneurysmal dilatation, follow-up is recommended.    Mild perivesicular haziness is noted, correlation for UTI/cystitis is needed.  The patient appears status post hysterectomy.    There is no evidence for small bowel obstructive process.  Mild air and stool noted throughout the colon.  There are areas of fat density associated with the colon that may relate to areas of submucosal fatty infiltrate and or colonic lipomas without obstructive change.  There is no evidence for colonic inflammatory or obstructive change.  The appendix is not identified.  There is no evidence for free intraperitoneal air.    The osseous structures demonstrate chronic and postoperative change, postoperative change of the spine with associated hardware and artifact noted.                                       X-Ray Chest PA And Lateral (Final result)  Result time 09/24/24 15:48:17      Final result by Louis Nj MD (09/24/24 15:48:17)                   Impression:      1. No acute radiographic abnormalities.      Electronically signed by: Louis Nj  Date:    09/24/2024  Time:    15:48               Narrative:    EXAMINATION:  XR CHEST PA AND  LATERAL    CLINICAL HISTORY:  Chest Pain;    COMPARISON:  January 2024    FINDINGS:  Heart size is normal.  The mediastinum is unremarkable.  No infiltrates or effusions are identified.  No acute osseous abnormality is demonstrated.  Changes of anterior instrumented fusion are noted in the lower cervical spine.                                       US Abdomen Limited (Final result)  Result time 09/24/24 15:38:52      Final result by Cleveland Figueroa MD (09/24/24 15:38:52)                   Impression:      1.  Background hepatic parenchymal findings suggesting steatosis.    2.  Prior cholecystectomy with no intra/extrahepatic biliary ductal dilation.    3.  Incidental small cyst in the lower pole the right kidney.    .      Electronically signed by: Cleveland Figueroa  Date:    09/24/2024  Time:    15:38               Narrative:    CLINICAL HISTORY:  ([id number])69 y/o  (1955) F    Upper abdominal pain;    TECHNIQUE:  (A#[id number], exam time 9/24/2024 15:34)    US ABDOMEN LIMITED [id number]    Right upper quadrant ultrasound, images obtained in grayscale and color. Additional Doppler images of the portal vein were obtained.    COMPARISON:  Ultrasound from 03/19/2020.    FINDINGS:  The LIVER is mildly prominent at 17.1 cm (sagittal right lobe). Hepatic parenchyma has increased echogenicity with poor delineation of the periportal triads. No definite intrahepatic masses are noted. The portal vein is patent with hepatopetal flow .    The BILIARY SYSTEM is normal in caliber; the common hepatic duct measures 5 mm.  The GALLBLADDER is surgically absent.    The PANCREATIC head and body are partially visualized but grossly normal in appearance. The pancreatic duct is not dilated.    The right KIDNEY is normal in size at 10.4 x 5.1 x 4.6 cm and echogenicity/texture.  There is a 16 mm simple cyst in the inferior pole the right kidney.  No stones or hydronephrosis seen. No solid masses are noted RUQ    The AORTA and IVC are  partially visualized and unremarkable.                                        Pending Diagnostic Studies:       None           Medications:  Reconciled Home Medications:      Medication List        START taking these medications      aluminum-magnesium hydroxide-simethicone 200-200-20 mg/5 mL Susp  Commonly known as: MAALOX  Take 30 mLs by mouth 4 (four) times daily as needed.     amLODIPine 5 MG tablet  Commonly known as: NORVASC  Take 1 tablet (5 mg total) by mouth once daily.  Start taking on: September 30, 2024     dicyclomine 20 mg tablet  Commonly known as: BENTYL  Take 1 tablet (20 mg total) by mouth every 6 (six) hours as needed (diarrhea).            CONTINUE taking these medications      acetaminophen 325 MG tablet  Commonly known as: TYLENOL  Take 2 tablets (650 mg total) by mouth every 6 (six) hours as needed for Pain (Do not take with any other tylenol containing products).     allopurinoL 300 MG tablet  Commonly known as: ZYLOPRIM  TAKE 1 TABLET DAILY     busPIRone 5 MG Tab  Commonly known as: BUSPAR  Take 1 tablet (5 mg total) by mouth 2 (two) times daily.     calcium citrate-vitamin D3 315-200 mg 315 mg-5 mcg (200 unit) per tablet  Commonly known as: CITRACAL+D  Take 1 tablet by mouth once daily.     clopidogreL 75 mg tablet  Commonly known as: PLAVIX  Take 1 tablet (75 mg total) by mouth once daily.     colestipoL 1 gram Tab  Commonly known as: COLESTID  Take 2 tablets (2 g total) by mouth once daily.     cyanocobalamin 1,000 mcg/mL injection  INJECT 1 ML IN THE MUSCLE EVERY 14 DAYS     EScitalopram oxalate 20 MG tablet  Commonly known as: LEXAPRO  TAKE 1 TABLET EVERY EVENING     fluticasone propionate 50 mcg/actuation nasal spray  Commonly known as: FLONASE  2 sprays (100 mcg total) by Each Nostril route 2 (two) times daily.     furosemide 20 MG tablet  Commonly known as: LASIX  TAKE 1 TABLET(20 MG) BY MOUTH EVERY DAY     gabapentin 400 MG capsule  Commonly known as: NEURONTIN  Take 1 capsule (400  mg total) by mouth 2 (two) times daily.     levothyroxine 75 MCG tablet  Commonly known as: SYNTHROID  TAKE 1 TABLET DAILY     magnesium oxide 400 mg (241.3 mg magnesium) tablet  Commonly known as: MAG-OX  Take 1 tablet (400 mg total) by mouth once daily.     methocarbamoL 750 MG Tab  Commonly known as: ROBAXIN  TAKE 1 TABLET EVERY 8 HOURS AS NEEDED FOR MUSCLE SPASMS     metoprolol succinate 50 MG 24 hr tablet  Commonly known as: TOPROL-XL  Take 1 tablet (50 mg total) by mouth once daily. Pt takes 1/2 tab daily     pantoprazole 40 MG tablet  Commonly known as: PROTONIX  TAKE 1 TABLET DAILY     PRESERVISION AREDS ORAL  Take 1 capsule by mouth 2 (two) times a day.     rosuvastatin 10 MG tablet  Commonly known as: CRESTOR  Take 1 tablet (10 mg total) by mouth once daily.     traZODone 100 MG tablet  Commonly known as: DESYREL  TAKE 1 TABLET EVERY EVENING     vitamin D 1000 units Tab  Commonly known as: VITAMIN D3  Take 1,000 Units by mouth once daily.     ZENPEP 40,000-126,000- 168,000 unit Cpdr  Generic drug: lipase-protease-amylase  Take 1 capsule by mouth 3 (three) times daily after meals.            STOP taking these medications      AREXVY (PF) 120 mcg/0.5 mL Susr vaccine  Generic drug: RSVPreF3 antigen-AS01E (PF)     azelastine 137 mcg (0.1 %) nasal spray  Commonly known as: ASTELIN     colchicine 0.6 mg tablet  Commonly known as: COLCRYS     HYDROcodone-acetaminophen  mg per tablet  Commonly known as: NORCO     mupirocin 2 % ointment  Commonly known as: BACTROBAN     ondansetron 4 MG Tbdl  Commonly known as: ZOFRAN-ODT     potassium chloride SA 20 MEQ tablet  Commonly known as: KLOR-CON M20     SHINGRIX (PF) 50 mcg/0.5 mL injection  Generic drug: varicella-zoster gE-AS01B (PF)              Indwelling Lines/Drains at time of discharge:   Lines/Drains/Airways       None                   Time spent on the discharge of patient: 33 minutes         Yousif Munoz DO  Department of Hospital Medicine  [place]  [facility]

## 2024-09-29 NOTE — PLAN OF CARE
09/29/24 1634   Medicare Message   Important Message from Medicare regarding Discharge Appeal Rights Given to patient/caregiver;Explained to patient/caregiver;Signed/date by patient/caregiver   Date IMM was signed 09/29/24   Time IMM was signed 163

## 2024-09-29 NOTE — ASSESSMENT & PLAN NOTE
- presented with abdominal pain  -has chronic diarrhea   -has recurrent C diff infections in the past   -recently used antibiotics-Augmentin for 10 days   -C diff testing was ordered but her stool was semi formed and lab canceled.

## 2024-09-29 NOTE — CARE UPDATE
SW spoke with patient regarding her discharge appeal. Patient stated that she signed an IMM that was given to her by the nursing supervisor on 9/24/2024 at approximately 9:30 pm. Patient presented copy of signed IMM to [facility]. Patient stated that she called [employer] and left messages but that no one returned her call. SW witnessed patient calling [employer] and leaving a voicemail message with her contact information at 4:32 pm. SW presented patient with another IMM form to signed. Patient signed IMM.

## 2024-09-29 NOTE — SUBJECTIVE & OBJECTIVE
Interval History:  Patient seen and examined, no acute complaints      Review of Systems   Constitutional:  Negative for appetite change.   Respiratory:  Negative for shortness of breath.    Cardiovascular: Negative.    Gastrointestinal:  Positive for diarrhea. Negative for abdominal distention, abdominal pain, nausea and vomiting.        Mentions that is foul smelling, and has history of C diff in the past   Genitourinary:  Negative for pelvic pain.   Musculoskeletal:  Positive for back pain. Negative for arthralgias.   Skin: Negative.    Neurological: Negative.    Psychiatric/Behavioral: Negative.       Objective:     Vital Signs (Most Recent):  Temp: 98.2 °F (36.8 °C) (09/29/24 1215)  Pulse: 75 (09/29/24 1215)  Resp: 18 (09/29/24 1426)  BP: (!) 173/70 (09/29/24 1215)  SpO2: (!) 94 % (09/29/24 1215) Vital Signs (24h Range):  Temp:  [97.5 °F (36.4 °C)-98.5 °F (36.9 °C)] 98.2 °F (36.8 °C)  Pulse:  [62-81] 75  Resp:  [10-18] 18  SpO2:  [92 %-97 %] 94 %  BP: (144-209)/(63-86) 173/70     Weight: 86.2 kg (190 lb 0.6 oz)  Body mass index is 29.76 kg/m².    Intake/Output Summary (Last 24 hours) at 9/29/2024 1537  Last data filed at 9/29/2024 0912  Gross per 24 hour   Intake 1290 ml   Output --   Net 1290 ml         Physical Exam  Constitutional:       General: She is not in acute distress.  HENT:      Head: Normocephalic and atraumatic.      Mouth/Throat:      Mouth: Mucous membranes are moist.      Pharynx: Oropharynx is clear.   Cardiovascular:      Rate and Rhythm: Normal rate and regular rhythm.   Pulmonary:      Effort: Pulmonary effort is normal. No respiratory distress.      Breath sounds: No wheezing.   Abdominal:      General: There is no distension.      Palpations: Abdomen is soft.      Tenderness: There is no abdominal tenderness. There is no right CVA tenderness, left CVA tenderness, guarding or rebound.      Hernia: A hernia is present.      Comments: Protuberant abdomen   Musculoskeletal:         General:  No swelling or tenderness.      Cervical back: Normal range of motion and neck supple.   Skin:     General: Skin is warm.   Neurological:      General: No focal deficit present.      Mental Status: She is alert and oriented to person, place, and time. Mental status is at baseline.   Psychiatric:         Mood and Affect: Mood normal.         Behavior: Behavior normal.             Significant Labs: All pertinent labs within the past 24 hours have been reviewed.  Recent Lab Results         09/29/24  0847   09/29/24  0846        Anion Gap   8       Baso # 0.05         Basophil % 0.6         BUN   13       Calcium   10.0       Chloride   108       CO2   26       Creatinine   0.8       Differential Method Automated         eGFR   >60.0       Eos # 0.3         Eos % 3.0         Glucose   103       Gran # (ANC) 5.7         Gran % 65.1         Hematocrit 35.1         Hemoglobin 10.8         Immature Grans (Abs) 0.03  Comment: Mild elevation in immature granulocytes is non specific and   can be seen in a variety of conditions including stress response,   acute inflammation, trauma and pregnancy. Correlation with other   laboratory and clinical findings is essential.           Immature Granulocytes 0.3         Lymph # 2.1         Lymph % 24.0         MCH 29.1         MCHC 30.8         MCV 95         Mono # 0.6         Mono % 7.0         MPV 9.0         nRBC 0         Platelet Count 259         Potassium   4.1       RBC 3.71         RDW 14.9         Sodium   142       WBC 8.72                 Significant Imaging: I have reviewed all pertinent imaging results/findings within the past 24 hours.  Imaging Results              CT Abdomen Pelvis With IV Contrast Routine Oral Contrast (Final result)  Result time 09/25/24 01:02:57      Final result by Andrey Domínguez MD (09/25/24 01:02:57)                   Impression:      The liver appears enlarged and there is diminished attenuation consistent with diffuse fatty  infiltrate.    Hypodensity of the right kidney noted, cannot be characterized as a cyst, ultrasound follow-up is recommended.    Borderline ectasia/aneurysmal dilatation of the abdominal aorta, follow-up is recommended.    Perivesicular haziness, correlation for UTI/cystitis is needed.    Additional findings as above.      Electronically signed by: Andrey Domínguez  Date:    09/25/2024  Time:    01:02               Narrative:    EXAMINATION:  CT ABDOMEN PELVIS WITH IV CONTRAST    CLINICAL HISTORY:  Generalized abdominal pain;    TECHNIQUE:  Low dose axial images, sagittal and coronal reformations were obtained from the lung bases to the pubic symphysis following the IV administration of 100 mL of Omnipaque 350 oral contrast was not utilized.  Single phase postcontrast CT examination of the abdomen and pelvis is submitted.    COMPARISON:  CT examination of the abdomen and pelvis May 20, 2024    FINDINGS:  The visualized lung bases demonstrate mild atelectatic change.  The stomach demonstrates nonspecific appearance of moderate distention with fluid, air and ingested material.  The gallbladder is surgically absent.  Diminished attenuation of the liver consistent with diffuse fatty infiltrate is noted.  The liver appears prominent.  There is no evidence for peripancreatic inflammatory change.  There is no evidence for acute process of the spleen or adrenal glands.  The left adrenal gland appears small.    There is a hypodensity at the lower pole of the right kidney measuring approximately 1.6 cm, 27 Hounsfield units cannot be characterized as a cyst, ultrasound may be helpful for further evaluation.  Additional tiny renal hypodensities are noted, too small for characterization.  There is no evidence for ureteral calculus or obstructive uropathy bilaterally.    The arterial vascular structures including the aorta demonstrate atherosclerotic change.  There is dilatation of the infrarenal abdominal aorta measuring up to 2.9  cm, borderline ectasia/aneurysmal dilatation, follow-up is recommended.    Mild perivesicular haziness is noted, correlation for UTI/cystitis is needed.  The patient appears status post hysterectomy.    There is no evidence for small bowel obstructive process.  Mild air and stool noted throughout the colon.  There are areas of fat density associated with the colon that may relate to areas of submucosal fatty infiltrate and or colonic lipomas without obstructive change.  There is no evidence for colonic inflammatory or obstructive change.  The appendix is not identified.  There is no evidence for free intraperitoneal air.    The osseous structures demonstrate chronic and postoperative change, postoperative change of the spine with associated hardware and artifact noted.                                       X-Ray Chest PA And Lateral (Final result)  Result time 09/24/24 15:48:17      Final result by Louis Nj MD (09/24/24 15:48:17)                   Impression:      1. No acute radiographic abnormalities.      Electronically signed by: Louis Nj  Date:    09/24/2024  Time:    15:48               Narrative:    EXAMINATION:  XR CHEST PA AND LATERAL    CLINICAL HISTORY:  Chest Pain;    COMPARISON:  January 2024    FINDINGS:  Heart size is normal.  The mediastinum is unremarkable.  No infiltrates or effusions are identified.  No acute osseous abnormality is demonstrated.  Changes of anterior instrumented fusion are noted in the lower cervical spine.                                       US Abdomen Limited (Final result)  Result time 09/24/24 15:38:52      Final result by Cleveland Figueroa MD (09/24/24 15:38:52)                   Impression:      1.  Background hepatic parenchymal findings suggesting steatosis.    2.  Prior cholecystectomy with no intra/extrahepatic biliary ductal dilation.    3.  Incidental small cyst in the lower pole the right kidney.    .      Electronically signed by: Cleveland  [place]  Date:    09/24/2024  Time:    15:38               Narrative:    CLINICAL HISTORY:  ([id number])67 y/o  (1955) F    Upper abdominal pain;    TECHNIQUE:  (A#[id number], exam time 9/24/2024 15:34)    US ABDOMEN LIMITED [id number]    Right upper quadrant ultrasound, images obtained in grayscale and color. Additional Doppler images of the portal vein were obtained.    COMPARISON:  Ultrasound from 03/19/2020.    FINDINGS:  The LIVER is mildly prominent at 17.1 cm (sagittal right lobe). Hepatic parenchyma has increased echogenicity with poor delineation of the periportal triads. No definite intrahepatic masses are noted. The portal vein is patent with hepatopetal flow .    The BILIARY SYSTEM is normal in caliber; the common hepatic duct measures 5 mm.  The GALLBLADDER is surgically absent.    The PANCREATIC head and body are partially visualized but grossly normal in appearance. The pancreatic duct is not dilated.    The right KIDNEY is normal in size at 10.4 x 5.1 x 4.6 cm and echogenicity/texture.  There is a 16 mm simple cyst in the inferior pole the right kidney.  No stones or hydronephrosis seen. No solid masses are noted RUQ    The AORTA and IVC are partially visualized and unremarkable.

## 2024-09-30 ENCOUNTER — TELEPHONE (OUTPATIENT)
Dept: GASTROENTEROLOGY | Facility: CLINIC | Age: 69
End: 2024-09-30
Payer: MEDICARE

## 2024-09-30 ENCOUNTER — TELEPHONE (OUTPATIENT)
Dept: FAMILY MEDICINE | Facility: CLINIC | Age: 69
End: 2024-09-30
Payer: MEDICARE

## 2024-09-30 PROCEDURE — 12000002 HC ACUTE/MED SURGE SEMI-PRIVATE ROOM

## 2024-09-30 PROCEDURE — 63600175 PHARM REV CODE 636 W HCPCS: Performed by: STUDENT IN AN ORGANIZED HEALTH CARE EDUCATION/TRAINING PROGRAM

## 2024-09-30 PROCEDURE — 25000003 PHARM REV CODE 250: Performed by: STUDENT IN AN ORGANIZED HEALTH CARE EDUCATION/TRAINING PROGRAM

## 2024-09-30 PROCEDURE — 25000003 PHARM REV CODE 250: Performed by: FAMILY MEDICINE

## 2024-09-30 PROCEDURE — 25000003 PHARM REV CODE 250: Performed by: INTERNAL MEDICINE

## 2024-09-30 PROCEDURE — 63600175 PHARM REV CODE 636 W HCPCS

## 2024-09-30 RX ORDER — BISACODYL 5 MG
10 TABLET, DELAYED RELEASE (ENTERIC COATED) ORAL ONCE
Status: COMPLETED | OUTPATIENT
Start: 2024-09-30 | End: 2024-09-30

## 2024-09-30 RX ORDER — POLYETHYLENE GLYCOL 3350 17 G/17G
238 POWDER, FOR SOLUTION ORAL ONCE
Status: COMPLETED | OUTPATIENT
Start: 2024-09-30 | End: 2024-09-30

## 2024-09-30 RX ORDER — HYDRALAZINE HYDROCHLORIDE 20 MG/ML
5 INJECTION INTRAMUSCULAR; INTRAVENOUS EVERY 6 HOURS PRN
Status: DISCONTINUED | OUTPATIENT
Start: 2024-09-30 | End: 2024-10-01 | Stop reason: HOSPADM

## 2024-09-30 RX ADMIN — PANTOPRAZOLE SODIUM 40 MG: 40 TABLET, DELAYED RELEASE ORAL at 08:09

## 2024-09-30 RX ADMIN — CHOLESTYRAMINE 4 GRAMS OF ANHYDROUS CHOLESTYRAMINE: 4 POWDER, FOR SUSPENSION ORAL at 08:09

## 2024-09-30 RX ADMIN — GABAPENTIN 400 MG: 300 CAPSULE ORAL at 08:09

## 2024-09-30 RX ADMIN — HYDRALAZINE HYDROCHLORIDE 5 MG: 20 INJECTION INTRAMUSCULAR; INTRAVENOUS at 05:09

## 2024-09-30 RX ADMIN — ESCITALOPRAM OXALATE 20 MG: 10 TABLET ORAL at 10:09

## 2024-09-30 RX ADMIN — PANCRELIPASE 3 CAPSULE: 60000; 12000; 38000 CAPSULE, DELAYED RELEASE PELLETS ORAL at 12:09

## 2024-09-30 RX ADMIN — AMLODIPINE BESYLATE 5 MG: 5 TABLET ORAL at 08:09

## 2024-09-30 RX ADMIN — BISACODYL 10 MG: 5 TABLET, COATED ORAL at 05:09

## 2024-09-30 RX ADMIN — CLOPIDOGREL BISULFATE 75 MG: 75 TABLET, FILM COATED ORAL at 08:09

## 2024-09-30 RX ADMIN — METHOCARBAMOL TABLETS 500 MG: 500 TABLET, COATED ORAL at 10:09

## 2024-09-30 RX ADMIN — PANCRELIPASE 3 CAPSULE: 60000; 12000; 38000 CAPSULE, DELAYED RELEASE PELLETS ORAL at 08:09

## 2024-09-30 RX ADMIN — TRAZODONE HYDROCHLORIDE 100 MG: 50 TABLET ORAL at 10:09

## 2024-09-30 RX ADMIN — LEVOTHYROXINE SODIUM 75 MCG: 0.03 TABLET ORAL at 08:09

## 2024-09-30 RX ADMIN — GABAPENTIN 400 MG: 300 CAPSULE ORAL at 10:09

## 2024-09-30 RX ADMIN — ACETAMINOPHEN 650 MG: 325 TABLET ORAL at 10:09

## 2024-09-30 RX ADMIN — ATORVASTATIN CALCIUM 20 MG: 20 TABLET, FILM COATED ORAL at 10:09

## 2024-09-30 RX ADMIN — METOPROLOL SUCCINATE 25 MG: 25 TABLET, EXTENDED RELEASE ORAL at 08:09

## 2024-09-30 RX ADMIN — POLYETHYLENE GLYCOL 3350 238 G: 17 POWDER, FOR SOLUTION ORAL at 05:09

## 2024-09-30 RX ADMIN — BUSPIRONE HYDROCHLORIDE 5 MG: 5 TABLET ORAL at 08:09

## 2024-09-30 RX ADMIN — PANCRELIPASE 3 CAPSULE: 60000; 12000; 38000 CAPSULE, DELAYED RELEASE PELLETS ORAL at 05:09

## 2024-09-30 RX ADMIN — MORPHINE SULFATE 2 MG: 2 INJECTION, SOLUTION INTRAMUSCULAR; INTRAVENOUS at 01:09

## 2024-09-30 RX ADMIN — BUSPIRONE HYDROCHLORIDE 5 MG: 5 TABLET ORAL at 10:09

## 2024-09-30 RX ADMIN — ALLOPURINOL 300 MG: 300 TABLET ORAL at 08:09

## 2024-09-30 NOTE — ASSESSMENT & PLAN NOTE
- presented with abdominal pain  -has chronic diarrhea   -has recurrent C diff infections in the past   -recently used antibiotics-Augmentin for 10 days   -C diff testing was ordered but her stool was semi formed and lab canceled.  -colonoscopy 10/1

## 2024-09-30 NOTE — PLAN OF CARE
[facility] reviewed [employer] website for update on Appeal ID: [id number].    Current Status: In Clinical Review    [facility] following.

## 2024-09-30 NOTE — SUBJECTIVE & OBJECTIVE
Interval History:  Patient seen and examined, she mentioned she is really worried she has something going on in her large bowel and would like to talk to GI/get a colonoscopy.  Diarrhea improved.    Review of Systems   Constitutional:  Negative for appetite change.   Respiratory:  Negative for shortness of breath.    Cardiovascular: Negative.    Gastrointestinal:  Positive for diarrhea. Negative for abdominal distention, abdominal pain, nausea and vomiting.        Has history of C diff in the past   Genitourinary:  Negative for pelvic pain.   Musculoskeletal:  Positive for back pain. Negative for arthralgias.   Skin: Negative.    Neurological: Negative.    Psychiatric/Behavioral: Negative.       Objective:     Vital Signs (Most Recent):  Temp: 98.2 °F (36.8 °C) (09/30/24 1624)  Pulse: 67 (09/30/24 1624)  Resp: 19 (09/30/24 1624)  BP: (!) 164/87 (09/30/24 1624)  SpO2: 95 % (09/30/24 1624) Vital Signs (24h Range):  Temp:  [97.5 °F (36.4 °C)-98.3 °F (36.8 °C)] 98.2 °F (36.8 °C)  Pulse:  [60-70] 67  Resp:  [18-19] 19  SpO2:  [94 %-96 %] 95 %  BP: (128-173)/(60-87) 164/87     Weight: 86.2 kg (190 lb 0.6 oz)  Body mass index is 29.76 kg/m².    Intake/Output Summary (Last 24 hours) at 9/30/2024 1646  Last data filed at 9/30/2024 1010  Gross per 24 hour   Intake 1100 ml   Output --   Net 1100 ml         Physical Exam  Constitutional:       General: She is not in acute distress.  HENT:      Head: Normocephalic and atraumatic.      Mouth/Throat:      Mouth: Mucous membranes are moist.      Pharynx: Oropharynx is clear.   Cardiovascular:      Rate and Rhythm: Normal rate and regular rhythm.   Pulmonary:      Effort: Pulmonary effort is normal. No respiratory distress.      Breath sounds: No wheezing.   Abdominal:      General: There is no distension.      Palpations: Abdomen is soft.      Tenderness: There is no abdominal tenderness. There is no right CVA tenderness, left CVA tenderness, guarding or rebound.      Hernia: A  hernia is present.      Comments: Protuberant abdomen   Musculoskeletal:         General: No swelling or tenderness.      Cervical back: Normal range of motion and neck supple.   Skin:     General: Skin is warm.   Neurological:      General: No focal deficit present.      Mental Status: She is alert and oriented to person, place, and time. Mental status is at baseline.   Psychiatric:         Mood and Affect: Mood normal.         Behavior: Behavior normal.             Significant Labs: All pertinent labs within the past 24 hours have been reviewed.  Recent Lab Results       None            Significant Imaging: I have reviewed all pertinent imaging results/findings within the past 24 hours.  Imaging Results              CT Abdomen Pelvis With IV Contrast Routine Oral Contrast (Final result)  Result time 09/25/24 01:02:57      Final result by Andrey Domínguez MD (09/25/24 01:02:57)                   Impression:      The liver appears enlarged and there is diminished attenuation consistent with diffuse fatty infiltrate.    Hypodensity of the right kidney noted, cannot be characterized as a cyst, ultrasound follow-up is recommended.    Borderline ectasia/aneurysmal dilatation of the abdominal aorta, follow-up is recommended.    Perivesicular haziness, correlation for UTI/cystitis is needed.    Additional findings as above.      Electronically signed by: Andrey Domínguez  Date:    09/25/2024  Time:    01:02               Narrative:    EXAMINATION:  CT ABDOMEN PELVIS WITH IV CONTRAST    CLINICAL HISTORY:  Generalized abdominal pain;    TECHNIQUE:  Low dose axial images, sagittal and coronal reformations were obtained from the lung bases to the pubic symphysis following the IV administration of 100 mL of Omnipaque 350 oral contrast was not utilized.  Single phase postcontrast CT examination of the abdomen and pelvis is submitted.    COMPARISON:  CT examination of the abdomen and pelvis May 20, 2024    FINDINGS:  The  visualized lung bases demonstrate mild atelectatic change.  The stomach demonstrates nonspecific appearance of moderate distention with fluid, air and ingested material.  The gallbladder is surgically absent.  Diminished attenuation of the liver consistent with diffuse fatty infiltrate is noted.  The liver appears prominent.  There is no evidence for peripancreatic inflammatory change.  There is no evidence for acute process of the spleen or adrenal glands.  The left adrenal gland appears small.    There is a hypodensity at the lower pole of the right kidney measuring approximately 1.6 cm, 27 Hounsfield units cannot be characterized as a cyst, ultrasound may be helpful for further evaluation.  Additional tiny renal hypodensities are noted, too small for characterization.  There is no evidence for ureteral calculus or obstructive uropathy bilaterally.    The arterial vascular structures including the aorta demonstrate atherosclerotic change.  There is dilatation of the infrarenal abdominal aorta measuring up to 2.9 cm, borderline ectasia/aneurysmal dilatation, follow-up is recommended.    Mild perivesicular haziness is noted, correlation for UTI/cystitis is needed.  The patient appears status post hysterectomy.    There is no evidence for small bowel obstructive process.  Mild air and stool noted throughout the colon.  There are areas of fat density associated with the colon that may relate to areas of submucosal fatty infiltrate and or colonic lipomas without obstructive change.  There is no evidence for colonic inflammatory or obstructive change.  The appendix is not identified.  There is no evidence for free intraperitoneal air.    The osseous structures demonstrate chronic and postoperative change, postoperative change of the spine with associated hardware and artifact noted.                                       X-Ray Chest PA And Lateral (Final result)  Result time 09/24/24 15:48:17      Final result by  Louis Nj MD (09/24/24 15:48:17)                   Impression:      1. No acute radiographic abnormalities.      Electronically signed by: Louis Nj  Date:    09/24/2024  Time:    15:48               Narrative:    EXAMINATION:  XR CHEST PA AND LATERAL    CLINICAL HISTORY:  Chest Pain;    COMPARISON:  January 2024    FINDINGS:  Heart size is normal.  The mediastinum is unremarkable.  No infiltrates or effusions are identified.  No acute osseous abnormality is demonstrated.  Changes of anterior instrumented fusion are noted in the lower cervical spine.                                       US Abdomen Limited (Final result)  Result time 09/24/24 15:38:52      Final result by Cleveland Figueroa MD (09/24/24 15:38:52)                   Impression:      1.  Background hepatic parenchymal findings suggesting steatosis.    2.  Prior cholecystectomy with no intra/extrahepatic biliary ductal dilation.    3.  Incidental small cyst in the lower pole the right kidney.    .      Electronically signed by: Cleveland Figueroa  Date:    09/24/2024  Time:    15:38               Narrative:    CLINICAL HISTORY:  ([id number] y/o  (1955) F    Upper abdominal pain;    TECHNIQUE:  (A#[id number], exam time 9/24/2024 15:34)    US ABDOMEN LIMITED [id number]    Right upper quadrant ultrasound, images obtained in grayscale and color. Additional Doppler images of the portal vein were obtained.    COMPARISON:  Ultrasound from 03/19/2020.    FINDINGS:  The LIVER is mildly prominent at 17.1 cm (sagittal right lobe). Hepatic parenchyma has increased echogenicity with poor delineation of the periportal triads. No definite intrahepatic masses are noted. The portal vein is patent with hepatopetal flow .    The BILIARY SYSTEM is normal in caliber; the common hepatic duct measures 5 mm.  The GALLBLADDER is surgically absent.    The PANCREATIC head and body are partially visualized but grossly normal in appearance. The pancreatic duct  is not dilated.    The right KIDNEY is normal in size at 10.4 x 5.1 x 4.6 cm and echogenicity/texture.  There is a 16 mm simple cyst in the inferior pole the right kidney.  No stones or hydronephrosis seen. No solid masses are noted RUQ    The AORTA and IVC are partially visualized and unremarkable.

## 2024-09-30 NOTE — ASSESSMENT & PLAN NOTE
-presented with abdominal pain  -GI on board  -endoscopy 9/26 showing gastritis   -biopsy sent  -tolerating solid diet  -Protonix daily

## 2024-09-30 NOTE — PLAN OF CARE
[facility] [place] received a voice message stating that an appeal has been filed for pt.  [facility] [place] cover sheet at [facility] and prepared the records requested for the appeal.  Appeal ID is: [id number].    Records submitted to [employer] with a confirmation ID of: [id number]    [facility] [place].

## 2024-09-30 NOTE — TELEPHONE ENCOUNTER
----- Message from  Rosa sent at 9/28/2024 12:18 PM CDT -----  Good afternoon    Patient discharging from [facility] after pancreatitis. Followed by GI while admitted. Per epic recommendations, needs follow up appointment in 8-14 days - no appointments available in [facility]. Please schedule appointment and contact patient with appointment date/time.     Thank you  Rosa Faria RN CM

## 2024-09-30 NOTE — PLAN OF CARE
[facility] received a call from [employer] with the determination on Appeal ID: [id number].  [employer] has agreed with the discharge.     Pt has chosen to file a second appeal. New appeal ID is [id number].  No additional records were requested at this time.  Pt's financial responsibility will begin at noon on 10/1/24. [facility] following.

## 2024-09-30 NOTE — PROGRESS NOTES
[facility] Medicine  Progress Note    Patient Name: Alessia Nelson  MRN: [id number]  Patient Class: IP- Inpatient   Admission Date: 9/24/2024  Length of Stay: 5 days  Attending Physician: Varun Hua, *  Primary Care Provider: Luana Calvert MD        Subjective:     Principal Problem:Diarrhea        HPI:  The patient is a 68-year-old female with past medical history of CKD stage 3b, CAD, recurrent C diff, chronic pancreatitis, pancreatic insufficiency, chronic diarrhea, status post right hemicolectomy due to complication from abdominal surgery, GERD, hypertension, hyperlipidemia, COPD who presented to the ED  for the evaluation of abdominal pain.    The patient reports that she started having abdominal pain since Saturday.  She reports that she started having pain in the lower part of the abdomen at 1st.  She also reports that she started getting pain in the back which slowly progressed to the front of the abdomen.  She complains of nausea but no vomiting. She reports that she has not passed flatus today but had bowel movement this morning.  She reports that she has history of chronic pancreatitis.  She reports that she has chronic diarrhea and is on colestipol and pancreatic enzyme for that.  She denies any fever, chills, cough, chest pain, shortness of breath.  She complains of bilateral swelling of lower extremities.  She denies any orthopnea, PND.  She reports that she has mild COPD and quit smoking about 2 years ago.      Overview/Hospital Course:  Patient admitted with abdominal pain and ongoing diarrhea despite stool bulking agent and Creon. She feels like this is another pancreatitis attack like previous ones. CT imaging did not show acute findings regarding the pancreas. Possible cystitis may explain the lower abdominal pain and started on Rocephin. UA pending. Giovana zhao has chronic pain from an ortho standpoint with right knee replacement and thoracic and lumbar  spine disease for which she goes to [facility] orth[facility].   Today, pain reported 10/10 despite medications given. GI consulted.  Endoscopy planned for 9/26.  Patient complained of multiple episodes of diarrhea, with being on Augmentin for 10 days recently, she also mentioned they were foul smelling, C diff testing was ordered but her stool was semi formed and lab canceled.   For gaseous distention, simethicone was ordered in addition to dicyclomine for possible IBS.  Follow up with GI for further recommendations.  Added amlodipine in addition to metoprolol for hypertension.  Also on p.r.n. hydralazine.  Colonoscopy planned for 10/1    Interval History:  Patient seen and examined, she mentioned she is really worried she has something going on in her large bowel and would like to talk to GI/get a colonoscopy.  Diarrhea improved.    Review of Systems   Constitutional:  Negative for appetite change.   Respiratory:  Negative for shortness of breath.    Cardiovascular: Negative.    Gastrointestinal:  Positive for diarrhea. Negative for abdominal distention, abdominal pain, nausea and vomiting.        Has history of C diff in the past   Genitourinary:  Negative for pelvic pain.   Musculoskeletal:  Positive for back pain. Negative for arthralgias.   Skin: Negative.    Neurological: Negative.    Psychiatric/Behavioral: Negative.       Objective:     Vital Signs (Most Recent):  Temp: 98.2 °F (36.8 °C) (09/30/24 1624)  Pulse: 67 (09/30/24 1624)  Resp: 19 (09/30/24 1624)  BP: (!) 164/87 (09/30/24 1624)  SpO2: 95 % (09/30/24 1624) Vital Signs (24h Range):  Temp:  [97.5 °F (36.4 °C)-98.3 °F (36.8 °C)] 98.2 °F (36.8 °C)  Pulse:  [60-70] 67  Resp:  [18-19] 19  SpO2:  [94 %-96 %] 95 %  BP: (128-173)/(60-87) 164/87     Weight: 86.2 kg (190 lb 0.6 oz)  Body mass index is 29.76 kg/m².    Intake/Output Summary (Last 24 hours) at 9/30/2024 1646  Last data filed at 9/30/2024 1010  Gross per 24 hour   Intake 1100 ml   Output --    Net 1100 ml         Physical Exam  Constitutional:       General: She is not in acute distress.  HENT:      Head: Normocephalic and atraumatic.      Mouth/Throat:      Mouth: Mucous membranes are moist.      Pharynx: Oropharynx is clear.   Cardiovascular:      Rate and Rhythm: Normal rate and regular rhythm.   Pulmonary:      Effort: Pulmonary effort is normal. No respiratory distress.      Breath sounds: No wheezing.   Abdominal:      General: There is no distension.      Palpations: Abdomen is soft.      Tenderness: There is no abdominal tenderness. There is no right CVA tenderness, left CVA tenderness, guarding or rebound.      Hernia: A hernia is present.      Comments: Protuberant abdomen   Musculoskeletal:         General: No swelling or tenderness.      Cervical back: Normal range of motion and neck supple.   Skin:     General: Skin is warm.   Neurological:      General: No focal deficit present.      Mental Status: She is alert and oriented to person, place, and time. Mental status is at baseline.   Psychiatric:         Mood and Affect: Mood normal.         Behavior: Behavior normal.             Significant Labs: All pertinent labs within the past 24 hours have been reviewed.  Recent Lab Results       None            Significant Imaging: I have reviewed all pertinent imaging results/findings within the past 24 hours.  Imaging Results              CT Abdomen Pelvis With IV Contrast Routine Oral Contrast (Final result)  Result time 09/25/24 01:02:57      Final result by Andrey Domínguez MD (09/25/24 01:02:57)                   Impression:      The liver appears enlarged and there is diminished attenuation consistent with diffuse fatty infiltrate.    Hypodensity of the right kidney noted, cannot be characterized as a cyst, ultrasound follow-up is recommended.    Borderline ectasia/aneurysmal dilatation of the abdominal aorta, follow-up is recommended.    Perivesicular haziness, correlation for UTI/cystitis  is needed.    Additional findings as above.      Electronically signed by: Andrey Domínguez  Date:    09/25/2024  Time:    01:02               Narrative:    EXAMINATION:  CT ABDOMEN PELVIS WITH IV CONTRAST    CLINICAL HISTORY:  Generalized abdominal pain;    TECHNIQUE:  Low dose axial images, sagittal and coronal reformations were obtained from the lung bases to the pubic symphysis following the IV administration of 100 mL of Omnipaque 350 oral contrast was not utilized.  Single phase postcontrast CT examination of the abdomen and pelvis is submitted.    COMPARISON:  CT examination of the abdomen and pelvis May 20, 2024    FINDINGS:  The visualized lung bases demonstrate mild atelectatic change.  The stomach demonstrates nonspecific appearance of moderate distention with fluid, air and ingested material.  The gallbladder is surgically absent.  Diminished attenuation of the liver consistent with diffuse fatty infiltrate is noted.  The liver appears prominent.  There is no evidence for peripancreatic inflammatory change.  There is no evidence for acute process of the spleen or adrenal glands.  The left adrenal gland appears small.    There is a hypodensity at the lower pole of the right kidney measuring approximately 1.6 cm, 27 Hounsfield units cannot be characterized as a cyst, ultrasound may be helpful for further evaluation.  Additional tiny renal hypodensities are noted, too small for characterization.  There is no evidence for ureteral calculus or obstructive uropathy bilaterally.    The arterial vascular structures including the aorta demonstrate atherosclerotic change.  There is dilatation of the infrarenal abdominal aorta measuring up to 2.9 cm, borderline ectasia/aneurysmal dilatation, follow-up is recommended.    Mild perivesicular haziness is noted, correlation for UTI/cystitis is needed.  The patient appears status post hysterectomy.    There is no evidence for small bowel obstructive process.  Mild air and  stool noted throughout the colon.  There are areas of fat density associated with the colon that may relate to areas of submucosal fatty infiltrate and or colonic lipomas without obstructive change.  There is no evidence for colonic inflammatory or obstructive change.  The appendix is not identified.  There is no evidence for free intraperitoneal air.    The osseous structures demonstrate chronic and postoperative change, postoperative change of the spine with associated hardware and artifact noted.                                       X-Ray Chest PA And Lateral (Final result)  Result time 09/24/24 15:48:17      Final result by Louis Nj MD (09/24/24 15:48:17)                   Impression:      1. No acute radiographic abnormalities.      Electronically signed by: Louis Nj  Date:    09/24/2024  Time:    15:48               Narrative:    EXAMINATION:  XR CHEST PA AND LATERAL    CLINICAL HISTORY:  Chest Pain;    COMPARISON:  January 2024    FINDINGS:  Heart size is normal.  The mediastinum is unremarkable.  No infiltrates or effusions are identified.  No acute osseous abnormality is demonstrated.  Changes of anterior instrumented fusion are noted in the lower cervical spine.                                       US Abdomen Limited (Final result)  Result time 09/24/24 15:38:52      Final result by Cleveland Figueroa MD (09/24/24 15:38:52)                   Impression:      1.  Background hepatic parenchymal findings suggesting steatosis.    2.  Prior cholecystectomy with no intra/extrahepatic biliary ductal dilation.    3.  Incidental small cyst in the lower pole the right kidney.    .      Electronically signed by: Cleveland Figueroa  Date:    09/24/2024  Time:    15:38               Narrative:    CLINICAL HISTORY:  ([id number])69 y/o  (1955) F    Upper abdominal pain;    TECHNIQUE:  (A#[id number], exam time 9/24/2024 15:34)    US ABDOMEN LIMITED [id number]    Right upper quadrant ultrasound, images  obtained in grayscale and color. Additional Doppler images of the portal vein were obtained.    COMPARISON:  Ultrasound from 03/19/2020.    FINDINGS:  The LIVER is mildly prominent at 17.1 cm (sagittal right lobe). Hepatic parenchyma has increased echogenicity with poor delineation of the periportal triads. No definite intrahepatic masses are noted. The portal vein is patent with hepatopetal flow .    The BILIARY SYSTEM is normal in caliber; the common hepatic duct measures 5 mm.  The GALLBLADDER is surgically absent.    The PANCREATIC head and body are partially visualized but grossly normal in appearance. The pancreatic duct is not dilated.    The right KIDNEY is normal in size at 10.4 x 5.1 x 4.6 cm and echogenicity/texture.  There is a 16 mm simple cyst in the inferior pole the right kidney.  No stones or hydronephrosis seen. No solid masses are noted RUQ    The AORTA and IVC are partially visualized and unremarkable.                                        Assessment/Plan:      * Diarrhea, chronic  - presented with abdominal pain  -has chronic diarrhea   -has recurrent C diff infections in the past   -recently used antibiotics-Augmentin for 10 days   -C diff testing was ordered but her stool was semi formed and lab canceled.  -colonoscopy 10/1      Lumbar radiculopathy  Resume pain meds and gabapentin, robaxin  Follow up with [facility]     Hypothyroidism due to acquired atrophy of thyroid    Resume synthroid  Ordered TSH with ongoing diarrhea - WNL      Pancreatic insufficiency  Continue Creon and stool bulking agent  GI on board, appreciate their recommendations      History of pancreatitis  On cholestyramine and Creon - she reports not helpful  Abdominal pain increased before presentation, CT abdomen and pelvis without acute pathology   Tolerating solid diet   Had gaseous distention, bowel sounds intact, simethicone added  Pain control  IVF  GI consulted  Endoscopy 9/26 showed gastritis, on  PPI    Gastritis  -presented with abdominal pain  -GI on board  -endoscopy 9/26 showing gastritis   -biopsy sent  -tolerating solid diet  -Protonix daily       Hypertension  Chronic, controlled. Latest blood pressure and vitals reviewed-     Temp:  [97.5 °F (36.4 °C)-98.3 °F (36.8 °C)]   Pulse:  [60-70]   Resp:  [18-19]   BP: (128-173)/(60-87)   SpO2:  [94 %-96 %] .   Home meds for hypertension were reviewed and noted below.   Hypertension Medications               amLODIPine (NORVASC) 5 MG tablet Take 1 tablet (5 mg total) by mouth once daily.    furosemide (LASIX) 20 MG tablet TAKE 1 TABLET(20 MG) BY MOUTH EVERY DAY    metoprolol succinate (TOPROL-XL) 50 MG 24 hr tablet Take 1 tablet (50 mg total) by mouth once daily. Pt takes 1/2 tab daily            While in the hospital, will manage blood pressure as follows; Adjust home antihypertensive regimen as follows- hydralazine p.r.n., amlodipine added    Will utilize p.r.n. blood pressure medication only if patient's blood pressure greater than 160/100 and she develops symptoms such as worsening chest pain or shortness of breath.      VTE Risk Mitigation (From admission, onward)           Ordered     enoxaparin injection 40 mg  Daily         09/24/24 2304     IP VTE HIGH RISK PATIENT  Once         09/24/24 2304     Place sequential compression device  Until discontinued         09/24/24 2304                    Discharge Planning   JOSE ANTONIO: 10/1/2024     Code Status: Full Code   Is the patient medically ready for discharge?:     Reason for patient still in hospital (select all that apply): Treatment  Discharge Plan A: Home   Discharge Delays: None known at this time              Varun Hua MD  Department of Hospital Medicine   [facility]

## 2024-09-30 NOTE — ASSESSMENT & PLAN NOTE
Chronic, controlled. Latest blood pressure and vitals reviewed-     Temp:  [97.5 °F (36.4 °C)-98.3 °F (36.8 °C)]   Pulse:  [60-70]   Resp:  [18-19]   BP: (128-173)/(60-87)   SpO2:  [94 %-96 %] .   Home meds for hypertension were reviewed and noted below.   Hypertension Medications               amLODIPine (NORVASC) 5 MG tablet Take 1 tablet (5 mg total) by mouth once daily.    furosemide (LASIX) 20 MG tablet TAKE 1 TABLET(20 MG) BY MOUTH EVERY DAY    metoprolol succinate (TOPROL-XL) 50 MG 24 hr tablet Take 1 tablet (50 mg total) by mouth once daily. Pt takes 1/2 tab daily            While in the hospital, will manage blood pressure as follows; Adjust home antihypertensive regimen as follows- hydralazine p.r.n., amlodipine added    Will utilize p.r.n. blood pressure medication only if patient's blood pressure greater than 160/100 and she develops symptoms such as worsening chest pain or shortness of breath.

## 2024-09-30 NOTE — PROGRESS NOTES
GASTROENTEROLOGY INPATIENT PROGRESS NOTE  Patient Name: Alessia Nelson  Patient MRN: [id number]  Patient : 1955    Admit Date: 2024  Service date: 2024    PCP: Luana Calvert MD      HPI: Patient is a 68 y.o. female with PMHx  68-year-old female with past medical history of CKD stage 3b, CAD, recurrent C diff, chronic pancreatitis, pancreatic insufficiency, chronic diarrhea, status post right hemicolectomy due to complication from abdominal surgery, GERD, hypertension, hyperlipidemia, COPD who presented to the ED  for the evaluation of abdominal pain..       CHART REVIEW:  EGD 24 - mild gastritis. . Lipoma in body  CTAP 24 - fatty liver. Renal right cyst. Borderline aneurysmal dilation of abd aort. Cystitis. Stool throughout the colon.   2022 lower endoscopic ultrasound with patent ileocolonic anastomosis.  Transverse colon tattoo.  Small polyps.  Endoscopic ultrasound of lipoma in the transverse colon  06/10/2021 EGD with gastritis   06/10/2021 colonoscopy with Dr. Chávez.  Small polyps.  Diverticulosis.  Tumor in the transverse colon.  Ileocolonic anastomosis     2020 upper endoscopic ultrasound with pancreatic parenchymal abnormalities consisting of lobularity in the entire pancreas.  One enlarged lymph node in the vernon hepatis  2019 upper endoscopic ultrasound with normal celiac trunk.  Pancreatic atrophy and lobularity    S: patient deferred discharge. Reported formed stool but 2-3/day.     Inpatient Medications:   allopurinoL  300 mg Oral Daily    amLODIPine  5 mg Oral Daily    atorvastatin  20 mg Oral Daily    bisacodyL  10 mg Oral Once    busPIRone  5 mg Oral BID    cholestyramine-aspartame  4 grams of anhydrous cholestyramine Oral BID    clopidogreL  75 mg Oral Daily    enoxparin  40 mg Subcutaneous Daily    EScitalopram oxalate  20 mg Oral QHS    gabapentin  400 mg Oral BID    levothyroxine  75 mcg Oral Before breakfast    lipase-protease-amylase  "12,000-38,000-60,000 units  3 capsule Oral TID WM    metoprolol succinate  25 mg Oral Daily    pantoprazole  40 mg Oral Daily    polyethylene glycol  238 g Oral Once    traZODone  100 mg Oral QHS         Review of Systems:  GENERAL: No fever, chills, weight loss  SKIN: No rashes, jaundice, pruritus  HEENT: No rhinorrhea, epistaxis, vision changes.  No trauma, tinnitus, lymphadenopathy or pharyngitis  CV: No chest pain, palpitations, edima or WATERS  PULM: No cough or sputum production.  No wheezing.  GI: AS IN HPI  URINARY:  No hematuria, dysuria  MS: No change in muscle or joint pain, weakness, or ROM  Neuro: No focal neurologic changes  PSYCH: No change in mood or personality.  No suicidal ideation.  ENDOCRINE: No fatigue      OBJECTIVE:    Vitals:    09/30/24 0512 09/30/24 0801 09/30/24 1126 09/30/24 1306   BP: 132/60 (!) 173/77 (!) 168/80    BP Location:       Patient Position:       Pulse: 66 70 70    Resp: 18 18 18 19   Temp: 97.7 °F (36.5 °C) 97.5 °F (36.4 °C) 97.9 °F (36.6 °C)    TempSrc: Oral Oral     SpO2: (!) 94% 95% 95%    Weight:       Height:           Physical Exam:    GEN: well-developed, well-nourished, awake and alert, non-toxic appearing adult  HEENT: PERRL, sclera anicteric, oral mucosa pink and moist without lesion  NECK: trachea midline; Good ROM  CV: regular rate and rhythm, no murmurs or gallops  RESP: clear to auscultation bilaterally, no wheezes, rhonci or rales  ABD: soft, non-tender, non-distended, normal bowel sounds  EXT: no swelling or edema, 2+ pulses distally  SKIN: no rashes or jaundice  PSYCH: normal affect    Labs:   Recent Labs   Lab 09/26/24  0414 09/27/24  0258 09/29/24  0847   WBC 7.94 8.18 8.72   HGB 10.4* 9.6* 10.8*    211 259   MCV 94 95 95     No results for input(s): "IRON", "FERRITIN" in the last 168 hours.    Invalid input(s): "IRONSAT"  Recent Labs   Lab 09/25/24  0542 09/26/24  0414 09/27/24  0258 09/29/24  0846    141 142 142   K 3.8 3.7 3.7 4.1   BUN 18 11 " 13 13   CREATININE 0.9 0.8 1.0 0.8   ALBUMIN 3.9 3.9 3.5  --    AST 16 15 11  --    ALT 13 11 9*  --    ALKPHOS 86 80 73  --          Radiology Review:  Ctap: The liver appears enlarged and there is diminished attenuation consistent with diffuse fatty infiltrate.     Hypodensity of the right kidney noted, cannot be characterized as a cyst, ultrasound follow-up is recommended.     Borderline ectasia/aneurysmal dilatation of the abdominal aorta, follow-up is recommended.     Perivesicular haziness, correlation for UTI/cystitis is needed.     Additional findings as above.      IMPRESSION / RECOMMENDATIONS:  69 yo female with h/o EPI, prior cdiff, fibromyalgia presents with diarrhea and deferring discharge.  Lumbar radiculopathy may be cause of chronic abdominal pain  Diarrhea - admission CT showed retained stool c/w constipation.  Colestipol is her only at home rx for this and maintains control well.   Colonoscopy ordered for tomorrow      Lillian Abel  9/30/2024  1:21 PM     10/1/2024 - Initial and current impression is that colonoscopy can be done outpatient.  We are available for this on patient's insistence but again. Medically indicated as outpatient procedure

## 2024-09-30 NOTE — TELEPHONE ENCOUNTER
----- Message from  Rosa sent at 9/28/2024 12:20 PM CDT -----  Good afternoon    Patient followed by GI (Dr. Saldivar) while inpatient at [facility]. Dr. Saldivar recommending patient to follow up with Dr. Metz on hospital discharge. Please schedule hospital follow up appointment and contact patient with appointment date/time.     Thank you  Rosa Faria RN CM

## 2024-10-01 ENCOUNTER — TELEPHONE (OUTPATIENT)
Dept: FAMILY MEDICINE | Facility: CLINIC | Age: 69
End: 2024-10-01
Payer: MEDICARE

## 2024-10-01 VITALS
HEART RATE: 64 BPM | DIASTOLIC BLOOD PRESSURE: 66 MMHG | SYSTOLIC BLOOD PRESSURE: 165 MMHG | WEIGHT: 190.06 LBS | TEMPERATURE: 98 F | RESPIRATION RATE: 18 BRPM | OXYGEN SATURATION: 97 % | HEIGHT: 67 IN | BODY MASS INDEX: 29.83 KG/M2

## 2024-10-01 PROCEDURE — 25000003 PHARM REV CODE 250: Performed by: FAMILY MEDICINE

## 2024-10-01 PROCEDURE — 63600175 PHARM REV CODE 636 W HCPCS: Performed by: STUDENT IN AN ORGANIZED HEALTH CARE EDUCATION/TRAINING PROGRAM

## 2024-10-01 PROCEDURE — 63600175 PHARM REV CODE 636 W HCPCS

## 2024-10-01 PROCEDURE — 25000003 PHARM REV CODE 250: Performed by: STUDENT IN AN ORGANIZED HEALTH CARE EDUCATION/TRAINING PROGRAM

## 2024-10-01 RX ORDER — HYDRALAZINE HYDROCHLORIDE 25 MG/1
25 TABLET, FILM COATED ORAL DAILY PRN
Qty: 30 TABLET | Refills: 0 | Status: SHIPPED | OUTPATIENT
Start: 2024-10-01 | End: 2024-10-31

## 2024-10-01 RX ADMIN — MORPHINE SULFATE 2 MG: 2 INJECTION, SOLUTION INTRAMUSCULAR; INTRAVENOUS at 09:10

## 2024-10-01 RX ADMIN — PANCRELIPASE 3 CAPSULE: 60000; 12000; 38000 CAPSULE, DELAYED RELEASE PELLETS ORAL at 09:10

## 2024-10-01 RX ADMIN — ACETAMINOPHEN 650 MG: 325 TABLET ORAL at 03:10

## 2024-10-01 RX ADMIN — ALLOPURINOL 300 MG: 300 TABLET ORAL at 09:10

## 2024-10-01 RX ADMIN — LEVOTHYROXINE SODIUM 75 MCG: 0.03 TABLET ORAL at 04:10

## 2024-10-01 RX ADMIN — HYDRALAZINE HYDROCHLORIDE 5 MG: 20 INJECTION INTRAMUSCULAR; INTRAVENOUS at 11:10

## 2024-10-01 RX ADMIN — MORPHINE SULFATE 2 MG: 2 INJECTION, SOLUTION INTRAMUSCULAR; INTRAVENOUS at 04:10

## 2024-10-01 RX ADMIN — METOPROLOL SUCCINATE 25 MG: 25 TABLET, EXTENDED RELEASE ORAL at 09:10

## 2024-10-01 RX ADMIN — GABAPENTIN 400 MG: 300 CAPSULE ORAL at 09:10

## 2024-10-01 RX ADMIN — MORPHINE SULFATE 2 MG: 2 INJECTION, SOLUTION INTRAMUSCULAR; INTRAVENOUS at 12:10

## 2024-10-01 RX ADMIN — AMLODIPINE BESYLATE 5 MG: 5 TABLET ORAL at 09:10

## 2024-10-01 RX ADMIN — PANTOPRAZOLE SODIUM 40 MG: 40 TABLET, DELAYED RELEASE ORAL at 04:10

## 2024-10-01 RX ADMIN — CLOPIDOGREL BISULFATE 75 MG: 75 TABLET, FILM COATED ORAL at 09:10

## 2024-10-01 RX ADMIN — BUSPIRONE HYDROCHLORIDE 5 MG: 5 TABLET ORAL at 09:10

## 2024-10-01 NOTE — NURSING
Dc instructions given, including medications. Pt verbalizes understanding. Pt Dc'ed home via wheelchair on room air to POV with neighbor. Pt tolerated well. No s/s of pain or distress noted.

## 2024-10-01 NOTE — PLAN OF CARE
[facility] reviewed [email or web address] website to verify the status of Second Appeal: [id number]    Current Status: In Clinical Review    [facility] Following

## 2024-10-01 NOTE — PLAN OF CARE
1130 - CM met with patient at bedside with charge nurse and patient advocate. Patient amendable to discharge on today with OP colonoscopy follow up. Patient voiced she did not want to follow up with [facility] -  offered to schedule appointment with Dr. Adams ([facility]) - patient stated d/t multiple upcoming appointments, patient requested to schedule follow up. Dr. Adams's clinic information added to AVS - patient cleared for discharge from      1033 - Notified this AM that patient requesting 2nd review for appeal - CM met with patient at bedside to provide letter from [facility] as courtesy. CM explained if 2nd party agrees with decision, patient will be responsible for bill after noon on today. Patient became upset stating that primary nurse from yesterday stated that discharge order was taken out - CM explained discharge order from 09/28/2024 still active at this time d/t [facility] decision. Patient stated she will get  involved post hospital discharge - CM offered patient advocate, to which patient stated she spoke with her on yesterday - Princess (patient advocate) card noted at bedside table.  offered to call Princess to see if she is available to speak with patient again today, patient refused at this time stating she can call. Dr. Hua notified endo of above and requested colonoscopy sooner today -  following     09/30/24 1033   Discharge Reassessment   Assessment Type Discharge Planning Reassessment   Did the patient's condition or plan change since previous assessment? Yes

## 2024-10-01 NOTE — NURSING
Patient made transportation aware of discharge and is on the way to  patient. Patient will make nurse aware once ride gets here.

## 2024-10-01 NOTE — PLAN OF CARE
"Appeal pending at this time - patient stated "she knows her body and needs colonoscopy inpatient prior to discharge rather than outpatient" - CM following     09/30/24 1033   Discharge Reassessment   Assessment Type Discharge Planning Reassessment       "

## 2024-10-01 NOTE — TELEPHONE ENCOUNTER
----- Message from Faith sent at 10/1/2024  4:08 PM CDT -----  Regarding: hfu  Contact: patient  Type:  Sooner Appointment Request    Caller is requesting a sooner appointment.  Caller declined first available appointment listed below.  Caller will not accept being placed on the waitlist and is requesting a message be sent to doctor.    Name of Caller:  patient   When is the first available appointment?  Oct 17  Symptoms:  hosp f/u dx: high blood pressure and stomach problems  Would the patient rather a call back or a response via [facility]?   Best Call Back Number:  [phone]    Additional Information:  needs to be seen sooner

## 2024-10-01 NOTE — PLAN OF CARE
10/01/24 1319   Final Note   Assessment Type Final Discharge Note   Anticipated Discharge Disposition Home     See previous final note from 09/28

## 2024-10-02 NOTE — DISCHARGE SUMMARY
[facility] Medicine  Discharge Summary      Patient Name: Alessia Nelson  MRN: [id number]  SURY: [id number]  Patient Class: IP- Inpatient  Admission Date: 9/24/2024  Hospital Length of Stay: 6 days  Discharge Date and Time: 10/1/2024  1:26 PM  Attending Physician: No att. providers found   Discharging Provider: Varun Hua MD  Primary Care Provider: Luana Calvert MD    Primary Care Team: Networked reference to record PCT     HPI:   The patient is a 68-year-old female with past medical history of CKD stage 3b, CAD, recurrent C diff, chronic pancreatitis, pancreatic insufficiency, chronic diarrhea, status post right hemicolectomy due to complication from abdominal surgery, GERD, hypertension, hyperlipidemia, COPD who presented to the ED  for the evaluation of abdominal pain.    The patient reports that she started having abdominal pain since Saturday.  She reports that she started having pain in the lower part of the abdomen at 1st.  She also reports that she started getting pain in the back which slowly progressed to the front of the abdomen.  She complains of nausea but no vomiting. She reports that she has not passed flatus today but had bowel movement this morning.  She reports that she has history of chronic pancreatitis.  She reports that she has chronic diarrhea and is on colestipol and pancreatic enzyme for that.  She denies any fever, chills, cough, chest pain, shortness of breath.  She complains of bilateral swelling of lower extremities.  She denies any orthopnea, PND.  She reports that she has mild COPD and quit smoking about 2 years ago.      Procedure(s) (LRB):  EGD (ESOPHAGOGASTRODUODENOSCOPY) (N/A)      Hospital Course:   Patient admitted with abdominal pain and ongoing diarrhea despite stool bulking agent and Creon. She feels like this is another pancreatitis attack like previous ones. CT imaging did not show acute findings regarding the pancreas. Possible  cystitis may explain the lower abdominal pain and started on Rocephin. UA pending. Giovana zhao has chronic pain from an ortho standpoint with right knee replacement and thoracic and lumbar spine disease for which she goes to [facility].   Today, pain reported 10/10 despite medications given. GI consulted.  Endoscopy planned for 9/26.  Patient complained of multiple episodes of diarrhea, with being on Augmentin for 10 days recently, she also mentioned they were foul smelling, C diff testing was ordered but her stool was semi formed and lab canceled.   For gaseous distention, simethicone was ordered in addition to dicyclomine for possible IBS.  Follow up with GI for further recommendations who recommended colonoscopy can be done as outpatient.  Added amlodipine in addition to metoprolol for hypertension.  Also on p.r.n. hydralazine.  Blood pressure well controlled.  Her diarrhea eventually improved.  Patient to follow up as outpatient with GI for a repeat colonoscopy.  She demonstrated and verbalized understanding of all the instructions.       Goals of Care Treatment Preferences:  Code Status: Full Code      SDOH Screening:  The patient was screened for utility difficulties, food insecurity, transport difficulties, housing insecurity, and interpersonal safety and there were no concerns identified this admission.     Consults:   Consults (From admission, onward)          Status Ordering Provider     Inpatient consult to Gastroenterology  Once        Provider:  Lillian Abel MD    Completed TACOS NGUYEN            No new Assessment & Plan notes have been filed under this hospital service since the last note was generated.  Service: Hospital Medicine    Final Active Diagnoses:    Diagnosis Date Noted POA    PRINCIPAL PROBLEM:  Diarrhea, chronic [R19.7] 12/23/2013 Yes    Irritable bowel syndrome with diarrhea [K58.0] 09/28/2024 Yes    Lumbar radiculopathy [M54.16] 03/14/2023 Yes    Hypothyroidism  due to acquired atrophy of thyroid [E03.4] 03/24/2022 Yes    Pancreatic insufficiency [K86.89] 01/04/2021 Yes     Chronic    History of pancreatitis [Z87.19] 07/24/2019 Not Applicable    Gastritis [K29.70] 05/16/2019 Yes    Hypertension [I10] 12/18/2013 Yes      Problems Resolved During this Admission:    Diagnosis Date Noted Date Resolved POA    Hypomagnesemia [E83.42] 03/05/2020 09/28/2024 Yes       Discharged Condition: stable    Disposition: Home or Self Care    Follow Up:   Follow-up Information       Sheree Metz MD Follow up.    Specialties: Gastroenterology, Internal Medicine  Why: Per Dr. Saldivar, please follow up with Dr. Metz for GI [facility] message sent to Dr. Metz's clinic requesting follow up appointment. Clinic to contact patient with appointment date/time  Contact information:  [address]  [address]  [facility] [address]  [phone]               Luana Calvert MD Follow up.    Specialty: Family Medicine  Why: no appointments available in [facility] message sent to PCP clinic requesting hospital follow up appointment. Clinic to contact patient with appointment date/time.  Contact information:  [address] [facility] [address]  [phone]               Elias Adams MD Follow up.    Specialties: Gastroenterology, Hepatology  Why: please call to schedule appointment  Contact information:  [address] [facility] [address]  [phone]                           Patient Instructions:      Ambulatory referral/consult to Outpatient Case Management   Referral Priority: Routine Referral Type: Consultation   Referral Reason: Specialty Services Required   Number of Visits Requested: 1     Notify your health care provider if you experience any of the following:  temperature >100.4     Notify your health care provider if you experience any of the following:  persistent nausea and vomiting or diarrhea     Notify your health  care provider if you experience any of the following:  severe uncontrolled pain     Notify your health care provider if you experience any of the following:  persistent dizziness, light-headedness, or visual disturbances     Activity as tolerated       Significant Diagnostic Studies: N/A    Pending Diagnostic Studies:       None           Medications:  Reconciled Home Medications:      Medication List        START taking these medications      aluminum-magnesium hydroxide-simethicone 200-200-20 mg/5 mL Susp  Commonly known as: MAALOX  Take 30 mLs by mouth 4 (four) times daily as needed.     amLODIPine 5 MG tablet  Commonly known as: NORVASC  Take 1 tablet (5 mg total) by mouth once daily.     dicyclomine 20 mg tablet  Commonly known as: BENTYL  Take 1 tablet (20 mg total) by mouth every 6 (six) hours as needed (diarrhea).     hydrALAZINE 25 MG tablet  Commonly known as: APRESOLINE  Take 1 tablet (25 mg total) by mouth daily as needed (Check BP after taking amlodipine, Lasix, metoprolol.  If it continues to stay greater than 160/100 take hydralazine).            CONTINUE taking these medications      acetaminophen 325 MG tablet  Commonly known as: TYLENOL  Take 2 tablets (650 mg total) by mouth every 6 (six) hours as needed for Pain (Do not take with any other tylenol containing products).     allopurinoL 300 MG tablet  Commonly known as: ZYLOPRIM  TAKE 1 TABLET DAILY     busPIRone 5 MG Tab  Commonly known as: BUSPAR  Take 1 tablet (5 mg total) by mouth 2 (two) times daily.     calcium citrate-vitamin D3 315-200 mg 315 mg-5 mcg (200 unit) per tablet  Commonly known as: CITRACAL+D  Take 1 tablet by mouth once daily.     clopidogreL 75 mg tablet  Commonly known as: PLAVIX  Take 1 tablet (75 mg total) by mouth once daily.     colestipoL 1 gram Tab  Commonly known as: COLESTID  Take 2 tablets (2 g total) by mouth once daily.     cyanocobalamin 1,000 mcg/mL injection  INJECT 1 ML IN THE MUSCLE EVERY 14 DAYS      EScitalopram oxalate 20 MG tablet  Commonly known as: LEXAPRO  TAKE 1 TABLET EVERY EVENING     fluticasone propionate 50 mcg/actuation nasal spray  Commonly known as: FLONASE  2 sprays (100 mcg total) by Each Nostril route 2 (two) times daily.     furosemide 20 MG tablet  Commonly known as: LASIX  TAKE 1 TABLET(20 MG) BY MOUTH EVERY DAY     gabapentin 400 MG capsule  Commonly known as: NEURONTIN  Take 1 capsule (400 mg total) by mouth 2 (two) times daily.     levothyroxine 75 MCG tablet  Commonly known as: SYNTHROID  TAKE 1 TABLET DAILY     magnesium oxide 400 mg (241.3 mg magnesium) tablet  Commonly known as: MAG-OX  Take 1 tablet (400 mg total) by mouth once daily.     methocarbamoL 750 MG Tab  Commonly known as: ROBAXIN  TAKE 1 TABLET EVERY 8 HOURS AS NEEDED FOR MUSCLE SPASMS     metoprolol succinate 50 MG 24 hr tablet  Commonly known as: TOPROL-XL  Take 1 tablet (50 mg total) by mouth once daily. Pt takes 1/2 tab daily     pantoprazole 40 MG tablet  Commonly known as: PROTONIX  TAKE 1 TABLET DAILY     PRESERVISION AREDS ORAL  Take 1 capsule by mouth 2 (two) times a day.     rosuvastatin 10 MG tablet  Commonly known as: CRESTOR  Take 1 tablet (10 mg total) by mouth once daily.     traZODone 100 MG tablet  Commonly known as: DESYREL  TAKE 1 TABLET EVERY EVENING     vitamin D 1000 units Tab  Commonly known as: VITAMIN D3  Take 1,000 Units by mouth once daily.     ZENPEP 40,000-126,000- 168,000 unit Cpdr  Generic drug: lipase-protease-amylase  Take 1 capsule by mouth 3 (three) times daily after meals.            STOP taking these medications      AREXVY (PF) 120 mcg/0.5 mL Susr vaccine  Generic drug: RSVPreF3 antigen-AS01E (PF)     azelastine 137 mcg (0.1 %) nasal spray  Commonly known as: ASTELIN     colchicine 0.6 mg tablet  Commonly known as: COLCRYS     HYDROcodone-acetaminophen  mg per tablet  Commonly known as: NORCO     mupirocin 2 % ointment  Commonly known as: BACTROBAN     ondansetron 4 MG  Tbdl  Commonly known as: ZOFRAN-ODT     potassium chloride SA 20 MEQ tablet  Commonly known as: KLOR-CON M20     SHINGRIX (PF) 50 mcg/0.5 mL injection  Generic drug: varicella-zoster gE-AS01B (PF)            Imaging Results              CT Abdomen Pelvis With IV Contrast Routine Oral Contrast (Final result)  Result time 09/25/24 01:02:57      Final result by Andrey Domínguez MD (09/25/24 01:02:57)                   Impression:      The liver appears enlarged and there is diminished attenuation consistent with diffuse fatty infiltrate.    Hypodensity of the right kidney noted, cannot be characterized as a cyst, ultrasound follow-up is recommended.    Borderline ectasia/aneurysmal dilatation of the abdominal aorta, follow-up is recommended.    Perivesicular haziness, correlation for UTI/cystitis is needed.    Additional findings as above.      Electronically signed by: Andrey Domínguez  Date:    09/25/2024  Time:    01:02               Narrative:    EXAMINATION:  CT ABDOMEN PELVIS WITH IV CONTRAST    CLINICAL HISTORY:  Generalized abdominal pain;    TECHNIQUE:  Low dose axial images, sagittal and coronal reformations were obtained from the lung bases to the pubic symphysis following the IV administration of 100 mL of Omnipaque 350 oral contrast was not utilized.  Single phase postcontrast CT examination of the abdomen and pelvis is submitted.    COMPARISON:  CT examination of the abdomen and pelvis May 20, 2024    FINDINGS:  The visualized lung bases demonstrate mild atelectatic change.  The stomach demonstrates nonspecific appearance of moderate distention with fluid, air and ingested material.  The gallbladder is surgically absent.  Diminished attenuation of the liver consistent with diffuse fatty infiltrate is noted.  The liver appears prominent.  There is no evidence for peripancreatic inflammatory change.  There is no evidence for acute process of the spleen or adrenal glands.  The left adrenal gland appears  small.    There is a hypodensity at the lower pole of the right kidney measuring approximately 1.6 cm, 27 Hounsfield units cannot be characterized as a cyst, ultrasound may be helpful for further evaluation.  Additional tiny renal hypodensities are noted, too small for characterization.  There is no evidence for ureteral calculus or obstructive uropathy bilaterally.    The arterial vascular structures including the aorta demonstrate atherosclerotic change.  There is dilatation of the infrarenal abdominal aorta measuring up to 2.9 cm, borderline ectasia/aneurysmal dilatation, follow-up is recommended.    Mild perivesicular haziness is noted, correlation for UTI/cystitis is needed.  The patient appears status post hysterectomy.    There is no evidence for small bowel obstructive process.  Mild air and stool noted throughout the colon.  There are areas of fat density associated with the colon that may relate to areas of submucosal fatty infiltrate and or colonic lipomas without obstructive change.  There is no evidence for colonic inflammatory or obstructive change.  The appendix is not identified.  There is no evidence for free intraperitoneal air.    The osseous structures demonstrate chronic and postoperative change, postoperative change of the spine with associated hardware and artifact noted.                                       X-Ray Chest PA And Lateral (Final result)  Result time 09/24/24 15:48:17      Final result by Louis Nj MD (09/24/24 15:48:17)                   Impression:      1. No acute radiographic abnormalities.      Electronically signed by: Louis Nj  Date:    09/24/2024  Time:    15:48               Narrative:    EXAMINATION:  XR CHEST PA AND LATERAL    CLINICAL HISTORY:  Chest Pain;    COMPARISON:  January 2024    FINDINGS:  Heart size is normal.  The mediastinum is unremarkable.  No infiltrates or effusions are identified.  No acute osseous abnormality is demonstrated.   Changes of anterior instrumented fusion are noted in the lower cervical spine.                                       US Abdomen Limited (Final result)  Result time 09/24/24 15:38:52      Final result by Cleveland Figueroa MD (09/24/24 15:38:52)                   Impression:      1.  Background hepatic parenchymal findings suggesting steatosis.    2.  Prior cholecystectomy with no intra/extrahepatic biliary ductal dilation.    3.  Incidental small cyst in the lower pole the right kidney.    .      Electronically signed by: Cleveland Figueroa  Date:    09/24/2024  Time:    15:38               Narrative:    CLINICAL HISTORY:  ([id number])69 y/o  (1955) F    Upper abdominal pain;    TECHNIQUE:  (A#[id number], exam time 9/24/2024 15:34)    US ABDOMEN LIMITED [id number]    Right upper quadrant ultrasound, images obtained in grayscale and color. Additional Doppler images of the portal vein were obtained.    COMPARISON:  Ultrasound from 03/19/2020.    FINDINGS:  The LIVER is mildly prominent at 17.1 cm (sagittal right lobe). Hepatic parenchyma has increased echogenicity with poor delineation of the periportal triads. No definite intrahepatic masses are noted. The portal vein is patent with hepatopetal flow .    The BILIARY SYSTEM is normal in caliber; the common hepatic duct measures 5 mm.  The GALLBLADDER is surgically absent.    The PANCREATIC head and body are partially visualized but grossly normal in appearance. The pancreatic duct is not dilated.    The right KIDNEY is normal in size at 10.4 x 5.1 x 4.6 cm and echogenicity/texture.  There is a 16 mm simple cyst in the inferior pole the right kidney.  No stones or hydronephrosis seen. No solid masses are noted RUQ    The AORTA and IVC are partially visualized and unremarkable.                                        Indwelling Lines/Drains at time of discharge:   Lines/Drains/Airways       None                   Time spent on the discharge of patient: 40 minutes          Varun Hua MD  Department of Hospital Medicine  [facility]

## 2024-10-03 ENCOUNTER — PATIENT OUTREACH (OUTPATIENT)
Dept: ADMINISTRATIVE | Facility: CLINIC | Age: 69
End: 2024-10-03
Payer: MEDICARE

## 2024-10-03 NOTE — PROGRESS NOTES
C3 nurse spoke with Alessia Nelson for a TCC post hospital discharge follow up call. The patient has a scheduled HOSFU appointment with Cesia Boone on 10/17/2024 @ 1000.

## 2024-10-07 DIAGNOSIS — K21.9 GASTROESOPHAGEAL REFLUX DISEASE, UNSPECIFIED WHETHER ESOPHAGITIS PRESENT: ICD-10-CM

## 2024-10-07 DIAGNOSIS — F51.01 PRIMARY INSOMNIA: ICD-10-CM

## 2024-10-08 ENCOUNTER — OUTPATIENT CASE MANAGEMENT (OUTPATIENT)
Dept: ADMINISTRATIVE | Facility: OTHER | Age: 69
End: 2024-10-08
Payer: MEDICARE

## 2024-10-08 RX ORDER — TRAZODONE HYDROCHLORIDE 100 MG/1
TABLET ORAL
Qty: 90 TABLET | Refills: 3 | Status: SHIPPED | OUTPATIENT
Start: 2024-10-08

## 2024-10-08 RX ORDER — PANTOPRAZOLE SODIUM 40 MG/1
TABLET, DELAYED RELEASE ORAL
Qty: 90 TABLET | Refills: 3 | Status: SHIPPED | OUTPATIENT
Start: 2024-10-08

## 2024-10-08 NOTE — TELEPHONE ENCOUNTER
No care due was identified.  [facility] Embedded Care Due Messages. Reference number: [id number].   10/07/2024 11:34:21 PM CDT

## 2024-10-08 NOTE — TELEPHONE ENCOUNTER
Refill Routing Note   Medication(s) are not appropriate for processing by [facility] for the following reason(s):        ED/Hospital Visit since last OV with provider    [facility] action(s):  Defer      Medication Therapy Plan: 9/24/24 ED VIST - Diarrhea / CHESTPAIN / ABDOMINAL PAIN    Extended chart review required: Yes     Appointments  past 12m or future 3m with PCP    Date Provider   Last Visit   9/4/2024 Luana Calvert MD   Next Visit   12/4/2024 Luana Calvert MD   ED visits in past 90 days: 0        Note composed:12:23 PM 10/08/2024

## 2024-10-16 RX ORDER — ROSUVASTATIN CALCIUM 10 MG/1
10 TABLET, COATED ORAL
Qty: 90 TABLET | Refills: 3 | Status: SHIPPED | OUTPATIENT
Start: 2024-10-16

## 2024-10-17 ENCOUNTER — OFFICE VISIT (OUTPATIENT)
Dept: FAMILY MEDICINE | Facility: CLINIC | Age: 69
End: 2024-10-17
Payer: MEDICARE

## 2024-10-17 ENCOUNTER — TELEPHONE (OUTPATIENT)
Dept: CARDIOLOGY | Facility: CLINIC | Age: 69
End: 2024-10-17
Payer: MEDICARE

## 2024-10-17 VITALS
OXYGEN SATURATION: 97 % | DIASTOLIC BLOOD PRESSURE: 66 MMHG | HEIGHT: 67 IN | WEIGHT: 196.69 LBS | BODY MASS INDEX: 30.87 KG/M2 | HEART RATE: 62 BPM | SYSTOLIC BLOOD PRESSURE: 128 MMHG | RESPIRATION RATE: 14 BRPM

## 2024-10-17 DIAGNOSIS — R73.09 ABNORMAL GLUCOSE: ICD-10-CM

## 2024-10-17 DIAGNOSIS — Z09 HOSPITAL DISCHARGE FOLLOW-UP: Primary | ICD-10-CM

## 2024-10-17 DIAGNOSIS — K86.1 CHRONIC PANCREATITIS, UNSPECIFIED PANCREATITIS TYPE: ICD-10-CM

## 2024-10-17 DIAGNOSIS — M96.1 POSTLAMINECTOMY SYNDROME OF LUMBAR REGION: ICD-10-CM

## 2024-10-17 DIAGNOSIS — I10 PRIMARY HYPERTENSION: ICD-10-CM

## 2024-10-17 PROCEDURE — 99999 PR PBB SHADOW E&M-EST. PATIENT-LVL IV: CPT | Mod: PBBFAC,,, | Performed by: NURSE PRACTITIONER

## 2024-10-17 PROCEDURE — 99214 OFFICE O/P EST MOD 30 MIN: CPT | Mod: PBBFAC,PN | Performed by: NURSE PRACTITIONER

## 2024-10-17 RX ORDER — TRAMADOL HYDROCHLORIDE 50 MG/1
50 TABLET ORAL EVERY 8 HOURS PRN
Qty: 12 TABLET | Refills: 0 | Status: SHIPPED | OUTPATIENT
Start: 2024-10-17

## 2024-10-17 RX ORDER — AMLODIPINE BESYLATE 5 MG/1
5 TABLET ORAL 2 TIMES DAILY
Qty: 60 TABLET | Refills: 5 | Status: SHIPPED | OUTPATIENT
Start: 2024-10-17

## 2024-10-17 RX ORDER — METHYLPREDNISOLONE 4 MG/1
TABLET ORAL
COMMUNITY
Start: 2024-10-14

## 2024-10-17 NOTE — PROGRESS NOTES
Subjective:       Patient ID: Alessia Nelson is a 69 y.o. female.    Chief Complaint: Hospital Follow Up (9/24-10/1)    Alessia Stone presents to the clinic today for hospital follow up  Established patient within the clinic, new patient to myself    Under the care of the following:  PCP: Dr. Calvert  Pulmonology:Dr. Olivier  Cardiology: Dr. ANGELITA Fowler  Endocrinology: Dr. Jovanny Rodriguez  Gastroenterology: Dr. Metz  Optometry: Dr. Rajinder Sheldon  Otolaryngologist: Dr. Belle   Interventional Pain Management: Dr. JL Carroll/Dr. Lorenz, now followed by Tin     Admission Date: 9/24/2024  Hospital Length of Stay: 6 days  Discharge Date and Time: 10/1/2024  1:26 PM  Attending Physician: Cristel att. providers found   Discharging Provider: Varun Hua MD  Primary Care Provider: Luana Calvert MD     Primary Care Team: Networked reference to record PCT      HPI:   The patient is a 68-year-old female with past medical history of CKD stage 3b, CAD, recurrent C diff, chronic pancreatitis, pancreatic insufficiency, chronic diarrhea, status post right hemicolectomy due to complication from abdominal surgery, GERD, hypertension, hyperlipidemia, COPD who presented to the ED  for the evaluation of abdominal pain.     The patient reports that she started having abdominal pain since Saturday.  She reports that she started having pain in the lower part of the abdomen at 1st.  She also reports that she started getting pain in the back which slowly progressed to the front of the abdomen.  She complains of nausea but no vomiting. She reports that she has not passed flatus today but had bowel movement this morning.  She reports that she has history of chronic pancreatitis.  She reports that she has chronic diarrhea and is on colestipol and pancreatic enzyme for that.  She denies any fever, chills, cough, chest pain, shortness of breath.  She complains of bilateral swelling of lower extremities.  She denies any orthopnea, PND.   She reports that she has mild COPD and quit smoking about 2 years ago.        Procedure(s) (LRB):  EGD (ESOPHAGOGASTRODUODENOSCOPY) (N/A)       Hospital Course:   Patient admitted with abdominal pain and ongoing diarrhea despite stool bulking agent and Creon. She feels like this is another pancreatitis attack like previous ones. CT imaging did not show acute findings regarding the pancreas. Possible cystitis may explain the lower abdominal pain and started on Rocephin. UA pending. Giovana zhao has chronic pain from an ortho standpoint with right knee replacement and thoracic and lumbar spine disease for which she goes to [facility].   Today, pain reported 10/10 despite medications given. GI consulted.  Endoscopy planned for 9/26.  Patient complained of multiple episodes of diarrhea, with being on Augmentin for 10 days recently, she also mentioned they were foul smelling, C diff testing was ordered but her stool was semi formed and lab canceled.   For gaseous distention, simethicone was ordered in addition to dicyclomine for possible IBS.  Follow up with GI for further recommendations who recommended colonoscopy can be done as outpatient.  Added amlodipine in addition to metoprolol for hypertension.  Also on p.r.n. hydralazine.  Blood pressure well controlled.  Her diarrhea eventually improved.  Patient to follow up as outpatient with GI for a repeat colonoscopy.  She demonstrated and verbalized understanding of all the instructions.       Reports since discharge overall she is doing okay. She is not reporting any further abdominal pain  She has been monitoring her blood pressure with reports of elevated blood pressure readings in the evening >160 systolic- has been taking hydralazine as prescribed. Thus far has been tolerating the addition of amlodipine- is in need of refills  Has not scheduled her hospital follow up with her cardiologist  Reports she is experiencing increased generalized pain in the  evening that has been affecting her blood pressure. Has hx of knee replacement with possible pending revision due to loose hardware. Currently taking a medrol dose pack provided by her provider in efforts to assist with inflammation to the hip region to help with mobility. Only slight response noted. Has follow up next week.   Expresses concerns with taking large amounts of Tylenol      Review of Systems    Patient Active Problem List   Diagnosis    CHF, chronic    Hypertension    COPD (chronic obstructive pulmonary disease)    Nicotine dependence    Diarrhea, chronic    Allergic rhinitis    Mild episode of recurrent major depressive disorder    Fibromyalgia    GERD (gastroesophageal reflux disease)    Hypothyroidism    Hyperlipidemia    History of malignant neoplasm of skin    MEN (multiple endocrine neoplasia)    Peripheral neuropathy    Poikiloderma    Pseudophakia    Rotator cuff injury    Tendinosis    Metatarsalgia    Dry eyes    Class 1 obesity due to excess calories with serious comorbidity and body mass index (BMI) of 30.0 to 30.9 in adult, today 29.1    Chronic bilateral low back pain without sciatica    Contusion of tail of pancreas    Bile duct abnormality    Gastritis    Lumbosacral spondylosis    S/P drug eluting coronary stent placement, 8/2017    Family history of premature CAD    Syncope and collapse, onset 2015, about 10 times, one episode noted hypotension at doctor office 9/2017    Multiple falls, started 9/2018, 5 times usually on standing    Excessive caffeine intake    Orthostatic hypotension    Abdominal obesity    Aspirin intolerance    LVH (left ventricular hypertrophy) due to hypertensive disease, with heart failure    History of cholecystectomy    Diverticulosis of large intestine without hemorrhage    Abnormal computerized tomography of biliary tract    History of pancreatitis    Primary osteoarthritis of right knee    Degenerative tear of medial meniscus of right knee    Baker's cyst of  knee, right    Chronic pain of right knee    Unilateral primary osteoarthritis, right knee    CAD (coronary artery disease)    History of TIA (transient ischemic attack)    Status post right knee replacement    Anemia    Bradycardia    Chronic pancreatitis    Colon cancer screening    B12 deficiency    History of Clostridium difficile infection    Abnormal finding on GI tract imaging    Pancreatic insufficiency    Degenerative disc disease, lumbar    Current smoker    Failed back syndrome of lumbar spine    Chronic cervical pain    Activity intolerance    Sacroiliitis    Hypothyroidism due to acquired atrophy of thyroid    Pain of lumbar facet joint    Non-refractory chronic migraine without aura    Lumbar radiculopathy    Isolated cervical dystonia    Gout    Generalized anxiety disorder    Stage 3b chronic kidney disease    Pharyngoesophageal dysphagia    Hoarseness, persistent    Chronic bronchitis    Bilateral carpal tunnel syndrome    Major depressive disorder, single episode, moderate    Aortic atherosclerosis    Impaired functional mobility and activity tolerance    Myofascial pain syndrome of lumbar spine    Myofascial pain syndrome, cervical    Solitary pulmonary nodule    Irritable bowel syndrome with diarrhea       Objective:      Physical Exam  Constitutional:       General: She is not in acute distress.     Appearance: Normal appearance. She is not ill-appearing.   Eyes:      Conjunctiva/sclera: Conjunctivae normal.   Cardiovascular:      Rate and Rhythm: Normal rate and regular rhythm.      Heart sounds: Normal heart sounds. No murmur heard.  Pulmonary:      Effort: Pulmonary effort is normal. No respiratory distress.      Breath sounds: Normal breath sounds.   Musculoskeletal:      Right lower leg: No edema.      Left lower leg: No edema.   Skin:     General: Skin is warm and dry.   Neurological:      Mental Status: She is alert. Mental status is at baseline.      Gait: Gait normal.   Psychiatric:     "     Mood and Affect: Mood normal.         Behavior: Behavior normal.         Thought Content: Thought content normal.         Judgment: Judgment normal.         Lab Results   Component Value Date    WBC 8.72 09/29/2024    HGB 10.8 (L) 09/29/2024    HCT 35.1 (L) 09/29/2024     09/29/2024    CHOL 131 05/20/2024    TRIG 276 (H) 05/20/2024    HDL 48 05/20/2024    ALT 9 (L) 09/27/2024    AST 11 09/27/2024     09/29/2024    K 4.1 09/29/2024     09/29/2024    CREATININE 0.8 09/29/2024    BUN 13 09/29/2024    CO2 26 09/29/2024    TSH 3.803 09/27/2024     The ASCVD Risk score (Fletcher ALVARADO, et al., 2019) failed to calculate for the following reasons:    Risk score cannot be calculated because patient has a medical history suggesting prior/existing ASCVD  Visit Vitals  /66 (BP Location: Left arm, Patient Position: Sitting)   Pulse 62   Resp 14   Ht 5' 7" (1.702 m)   Wt 89.2 kg (196 lb 11.2 oz)   SpO2 97%   BMI 30.81 kg/m²      Assessment:       1. Hospital discharge follow-up    2. Primary hypertension    3. Abnormal glucose    4. Postlaminectomy syndrome of lumbar region    5. Chronic pancreatitis, unspecified pancreatitis type        Plan:       1. Hospital discharge follow-up    2. Primary hypertension  -     amLODIPine (NORVASC) 5 MG tablet; Take 1 tablet (5 mg total) by mouth 2 (two) times daily.  Dispense: 60 tablet; Refill: 5  The patient was counseled on HTN education, management and recommendations. The need for weight reduction was reinforced and a BMI goal of 19 to 25 was set.  Patient was encouraged to adhere to a low sodium diet and a DASH diet was recommended. Patient was also encouraged to engage in routine exercise such as walking most days of the week greater than 30 minutes. Patient education materials were provided to the patient for home review and further reinforcement of topics discussed.     Please monitor your blood pressure twice a day.  Make sure you have not had any caffeine " or tobacco with in 45 minutes of checking your blood pressure.  Keep a log to bring to your next office visit.       3. Abnormal glucose  -     Hemoglobin A1C; Future; Expected date: 10/17/2024    4. Postlaminectomy syndrome of lumbar region  -     traMADoL (ULTRAM) 50 mg tablet; Take 1 tablet (50 mg total) by mouth every 8 (eight) hours as needed for Pain.  Dispense: 12 tablet; Refill: 0   reviewed- short term only of Tramadol provided with no refills. Patient is to follow up with external provider next week. Patient voiced understanding   5. Chronic pancreatitis, unspecified pancreatitis type  -     traMADoL (ULTRAM) 50 mg tablet; Take 1 tablet (50 mg total) by mouth every 8 (eight) hours as needed for Pain.  Dispense: 12 tablet; Refill: 0       No follow-ups on file.      Future Appointments       Date Provider Specialty Appt Notes    12/2/2024 Jose Olivier MD Pulmonology 6m f/u    12/3/2024  Radiology     12/3/2024  Lab non fasting labs    12/4/2024 Luana Calvert MD Family Medicine 3 mon f/u

## 2024-10-17 NOTE — TELEPHONE ENCOUNTER
----- Message from [place] sent at 10/17/2024  1:32 PM CDT -----  Contact: self  Type:  Needs Medical Advice    Who Called: self  Symptoms (please be specific): pt is needing to make an appt with dr for her bp. Pt sts it is not under control. It is not stable   Would the patient rather a call back or a response via [facility]? call  Best Call Back Number:  [phone]  Additional Information: please advise and thank you.

## 2024-10-23 DIAGNOSIS — M54.50 LUMBAR SPINE PAIN: Primary | ICD-10-CM

## 2024-10-31 ENCOUNTER — OFFICE VISIT (OUTPATIENT)
Dept: CARDIOLOGY | Facility: CLINIC | Age: 69
End: 2024-10-31
Payer: MEDICARE

## 2024-10-31 VITALS
OXYGEN SATURATION: 97 % | DIASTOLIC BLOOD PRESSURE: 70 MMHG | BODY MASS INDEX: 31.16 KG/M2 | HEART RATE: 70 BPM | SYSTOLIC BLOOD PRESSURE: 132 MMHG | WEIGHT: 198.5 LBS | HEIGHT: 67 IN

## 2024-10-31 DIAGNOSIS — I25.10 CORONARY ARTERY DISEASE INVOLVING NATIVE CORONARY ARTERY OF NATIVE HEART WITHOUT ANGINA PECTORIS: Primary | ICD-10-CM

## 2024-10-31 DIAGNOSIS — I10 PRIMARY HYPERTENSION: ICD-10-CM

## 2024-10-31 DIAGNOSIS — Z01.818 PRE-OP EVALUATION: ICD-10-CM

## 2024-10-31 LAB
OHS QRS DURATION: 94 MS
OHS QTC CALCULATION: 446 MS

## 2024-10-31 PROCEDURE — 99215 OFFICE O/P EST HI 40 MIN: CPT | Mod: PBBFAC,PN | Performed by: STUDENT IN AN ORGANIZED HEALTH CARE EDUCATION/TRAINING PROGRAM

## 2024-10-31 PROCEDURE — 99999 PR PBB SHADOW E&M-EST. PATIENT-LVL V: CPT | Mod: PBBFAC,,, | Performed by: STUDENT IN AN ORGANIZED HEALTH CARE EDUCATION/TRAINING PROGRAM

## 2024-11-05 ENCOUNTER — TELEPHONE (OUTPATIENT)
Dept: FAMILY MEDICINE | Facility: CLINIC | Age: 69
End: 2024-11-05
Payer: MEDICARE

## 2024-11-05 ENCOUNTER — TELEPHONE (OUTPATIENT)
Dept: PULMONOLOGY | Facility: CLINIC | Age: 69
End: 2024-11-05
Payer: MEDICARE

## 2024-11-05 NOTE — TELEPHONE ENCOUNTER
----- Message from Sridevikian sent at 11/5/2024  9:23 AM CST -----  Regarding: advice  Type:  Needs Medical Advice    Who Called: pt    Best Call Back Number: [phone]      Additional Information: pt st that she needs a sx clearance. She st that she's having knee sx with Dr. Michele Singh  at [facility]  on 11/19. She wants to know if she needs an appt to get clearance.  please call to discuss.

## 2024-11-05 NOTE — TELEPHONE ENCOUNTER
----- Message from Sridevikian sent at 11/5/2024  9:23 AM CST -----  Regarding: advice  Type:  Needs Medical Advice    Who Called: pt    Best Call Back Number: [phone]      Additional Information: pt st that she needs a sx clearance. She st that she's having knee sx with Dr. Michele Singh  at [facility]  on 11/19. She wants to know if she needs an appt to get clearance.  please call to discuss.   Scribe Attestation (For Scribes USE Only)... Attending Attestation (For Attendings USE Only).../Scribe Attestation (For Scribes USE Only)...

## 2024-11-05 NOTE — TELEPHONE ENCOUNTER
Spoke to patient.  Informed her that she needed a surgery clearance appt.  Appt made and she verbalized understanding.

## 2024-11-06 ENCOUNTER — OFFICE VISIT (OUTPATIENT)
Dept: PULMONOLOGY | Facility: CLINIC | Age: 69
End: 2024-11-06
Payer: MEDICARE

## 2024-11-06 ENCOUNTER — OFFICE VISIT (OUTPATIENT)
Dept: FAMILY MEDICINE | Facility: CLINIC | Age: 69
End: 2024-11-06
Payer: MEDICARE

## 2024-11-06 VITALS
OXYGEN SATURATION: 97 % | BODY MASS INDEX: 30.63 KG/M2 | SYSTOLIC BLOOD PRESSURE: 128 MMHG | RESPIRATION RATE: 16 BRPM | HEART RATE: 62 BPM | HEIGHT: 67 IN | WEIGHT: 195.13 LBS | DIASTOLIC BLOOD PRESSURE: 82 MMHG

## 2024-11-06 VITALS
HEIGHT: 67 IN | SYSTOLIC BLOOD PRESSURE: 155 MMHG | BODY MASS INDEX: 30.88 KG/M2 | OXYGEN SATURATION: 96 % | HEART RATE: 68 BPM | DIASTOLIC BLOOD PRESSURE: 93 MMHG | WEIGHT: 196.75 LBS

## 2024-11-06 DIAGNOSIS — I50.22 CHRONIC SYSTOLIC CONGESTIVE HEART FAILURE: ICD-10-CM

## 2024-11-06 DIAGNOSIS — Z01.818 PREOP EXAMINATION: Primary | ICD-10-CM

## 2024-11-06 DIAGNOSIS — R91.1 SOLITARY PULMONARY NODULE: Primary | ICD-10-CM

## 2024-11-06 DIAGNOSIS — Z95.5 S/P DRUG ELUTING CORONARY STENT PLACEMENT: Chronic | ICD-10-CM

## 2024-11-06 PROCEDURE — 99999 PR PBB SHADOW E&M-EST. PATIENT-LVL IV: CPT | Mod: PBBFAC,,, | Performed by: INTERNAL MEDICINE

## 2024-11-06 PROCEDURE — 99214 OFFICE O/P EST MOD 30 MIN: CPT | Mod: PBBFAC,PN | Performed by: STUDENT IN AN ORGANIZED HEALTH CARE EDUCATION/TRAINING PROGRAM

## 2024-11-06 PROCEDURE — 99214 OFFICE O/P EST MOD 30 MIN: CPT | Mod: PBBFAC,27,PO | Performed by: INTERNAL MEDICINE

## 2024-11-06 PROCEDURE — 99213 OFFICE O/P EST LOW 20 MIN: CPT | Mod: S$PBB,,, | Performed by: INTERNAL MEDICINE

## 2024-11-06 PROCEDURE — 99999 PR PBB SHADOW E&M-EST. PATIENT-LVL IV: CPT | Mod: PBBFAC,,, | Performed by: STUDENT IN AN ORGANIZED HEALTH CARE EDUCATION/TRAINING PROGRAM

## 2024-11-06 NOTE — LETTER
November 6, 2024    Re:Alessia Nelson  [address] [place] [address]             [facility]  [address]  [place] [address]  Phone: [phone]  Fax: [phone] To whom it may concern:    Alessia has mild lung disease (copd and restriction that is mild to moderate) and in stable optimal shape.  Pt is cleared for knee operation.    Sincerely,        Jose Olivier MD   Pulmonary

## 2024-11-06 NOTE — PROGRESS NOTES
11/6/2024    Alessia Nelson  New Patient Consult    Chief Complaint   Patient presents with    Follow-up    COPD       HPI:    11/6/2024 pt to have right tkr. Pt doing well wrt lungs.  Pt had had c diff with prior tkr.  Pt concerned may get c diff    Pt had pancreatitis -- recurrent -- in sept 2024.  The patient has not had her lung lesion followed up.  She wishes to undergo navigational bronchoscopy.  This was a recommendation given to her by MD Soliz.  We have had difficulty trying to get a needle biopsy done.  She is agreeable to do this and [facility].  She is going to be on through a knee operation in about a week.      May 29, 2024-pt had stent placed 7 yrs ago.  Pt will have occ swallow problems.  From sticky note done over a week ago-Notify breathing test showed lungs to be little small.  COPD was not measured to be significant.  Diffusion was low-low oxygen may occur with walking.  No change in plan  Notify PET scan was favorable.  Findings to suggest infection or inflammation.  This is what was suspected.  Will review this at follow-up.  You had history c diff---doxycycline should be safe .   Patient Instructions   We discuss lung lesion right lower lung-- not seen 12/2021 and same 10/2023 and 4/2024 , pet scan good 5/2024.  You wish to do needle biopsy -- lesion should be easy to access, pet scan was favorable but not ruling out cancer.  There is no alternative diagnosis for spot.  Will order needle biopsy  If no clear diagnosis -- will need follow up xray as biopsy may not exclude cancer.  Will order ct in 6 months.  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  Pt smoked til 1/2023.    Pt had neck surg 18 yrs ago, needed esophagus dilated many yrs ago-- and subsequently has had another dilation ---has sense of not being able to swallow-- several times daily.   Had had creamy yellow mucous.  No fever, appetite was real good since 1/2024 with 20 lbs wt gain.        Pt had had routine screen ct screen in 10/2023 and 4/2024 with patchy rll sup segment apical component infiltrate    Pt had had pneumonia 2-3 times.    Pt had blood clot between chest wall and lung-- drained -- 1986....occured aroung time of laproscopyic procedure.       Pt suspected of having add,   Pt had had dilation esophagus-- gets dilation by gi outside [place], has more trouble swallow recently -- denies aspiration-- pt now overdue for dilation...  Pt slept only 4 h rs last 3 days -- pt felt to have some ptsd --    Pt does have some sob    Will have central chest pains -- left main and lad and rca have some calcification on ct chest   had prior stent and , last stress test in 9/2020 with no ischemia seen.    Patient Instructions   Ct chest from 10/2023 showed vague density in right lower lung superior segment---- area worsened by ct 4/2024.  Would check pet scan but better after germ addressed...        You have chronic yellow mucous from lung and you have had aspiration problems...    You have mild calcified coronary arteries and history of stent.  You are to follow up with cardiology.    You benefitted from inahlers in past -- and have some short breath.  Use trelegy daily -- continue if helps.  Would check lung capacity    You had history c diff---doxycycline should be safe .     Culture, pet scan in 2 weeks, if pet scan shows abnormality bx-- if not culture/antibiotic or bronchoscopy with special biopsy???.    Would do biopsy if pet scan suggests need.    Will do phone report pet, notify for significant culture result, dose antibiotic if needed....              PFSH:  Past Medical History:   Diagnosis Date    Acute pancreatitis     Back pain     CAD (coronary artery disease)     CHF (congestive heart failure)     COPD (chronic obstructive pulmonary disease)     Depression     Diverticulosis     Encounter for blood transfusion     GERD (gastroesophageal reflux disease)     History of blood clots      1986 AFTER BOWEL RESCETION    History of bowel resection     Hyperlipidemia     Hypertension     Peritonitis     Pulmonary nodule     Seizures     HAD A SEIZURE FROM PHENERGAN     Stroke     Thyroid disease     TIA (transient ischemic attack)     Wears dentures     upper         Past Surgical History:   Procedure Laterality Date    ADRENAL GLAND SURGERY      APPENDECTOMY      BACK SURGERY      CAUDAL EPIDURAL STEROID INJECTION N/A 3/1/2021    Procedure: Injection-steroid-epidural-caudal;  Surgeon: Socorro Lorenz MD;  Location: [facility] OR;  Service: Pain Management;  Laterality: N/A;    CHOLECYSTECTOMY      COLONOSCOPY      COLONOSCOPY N/A 6/10/2021    Procedure: COLONOSCOPY;  Surgeon: Sheree Metz MD;  Location: [facility] ENDO;  Service: Endoscopy;  Laterality: N/A;    COLONOSCOPY N/A 3/8/2022    Procedure: COLONOSCOPY;  Surgeon: Maurilio Rodriguez MD;  Location: [facility] (2ND FLR);  Service: Endoscopy;  Laterality: N/A;  Pt to perform at-home COVID test morning of procedure-DS  2/24-Blood thinner approval rec'[facility] Dr. Walsh (see letters tab 2/24/22)-DS  3/2-Instructions sent via email-DS  3/7-Pt unable to come earlier due to ride-DS    CORONARY STENT PLACEMENT      CYSTOURETHROSCOPY N/A 11/13/2019    Procedure: CYSTOURETHROSCOPY;  Surgeon: Shun Nuñez MD;  Location: [facility] OR;  Service: Urology;  Laterality: N/A;    DIRECT DIAGNOSTIC LARYNGOSCOPY WITH BRONCHOSCOPY AND ESOPHAGOSCOPY N/A 1/3/2024    Procedure: LARYNGOSCOPY, DIRECT, DIAGNOSTIC, WITH BRONCHOSCOPY AND ESOPHAGOSCOPY;  Surgeon: Mir Belle MD;  Location: [facility] OR;  Service: ENT;  Laterality: N/A;    ENDOSCOPIC ULTRASOUND OF LOWER GASTROINTESTINAL TRACT N/A 3/8/2022    Procedure: ULTRASOUND, LOWER GI TRACT, ENDOSCOPIC;  Surgeon: Maurilio Rodriguez MD;  Location: [facility] (2ND FLR);  Service: Endoscopy;  Laterality: N/A;  Pt to perform at-home COVID test morning of procedure-DS  2/24-Blood thinner approval rec'[facility] Dr. Walsh (see letters tab  2/24/22)-DS  3/2-Instructions sent via email-DS  3/7-Pt unable to come earlier due to ride-DS    ENDOSCOPIC ULTRASOUND OF UPPER GASTROINTESTINAL TRACT N/A 11/25/2019    Procedure: ULTRASOUND, UPPER GI TRACT, ENDOSCOPIC;  Surgeon: Alhaji Bridges MD;  Location: [facility] (2ND FLR);  Service: Endoscopy;  Laterality: N/A;  5 day hold Plavix, Dr Jovanni Serrano - pg  PM prep    ENDOSCOPIC ULTRASOUND OF UPPER GASTROINTESTINAL TRACT N/A 11/2/2020    Procedure: ULTRASOUND, UPPER GI TRACT, ENDOSCOPIC;  Surgeon: Maurilio Rodriguez MD;  Location: [facility] ENDO (2ND FLR);  Service: Endoscopy;  Laterality: N/A;  5 day hold Plavix, Dr Roman Walsh - pg  Covid-19 test 10/30/20 at [facility]    EPIDURAL STEROID INJECTION INTO CERVICAL SPINE N/A 12/8/2021    Procedure: Injection-steroid-epidural-cervical;  Surgeon: Socorro Lorenz MD;  Location: [facility] OR;  Service: Pain Management;  Laterality: N/A;    EPIDURAL STEROID INJECTION INTO CERVICAL SPINE N/A 11/30/2023    Procedure: Injection-steroid-epidural-cervical;  Surgeon: Maurilio Carroll MD;  Location[facility];  Service: Anesthesiology;  Laterality: N/A;  c7-T1    ESOPHAGOGASTRODUODENOSCOPY N/A 6/10/2021    Procedure: EGD (ESOPHAGOGASTRODUODENOSCOPY);  Surgeon: Sheree Metz MD;  Location: [facility] ENDO;  Service: Endoscopy;  Laterality: N/A;    ESOPHAGOGASTRODUODENOSCOPY N/A 9/26/2024    Procedure: EGD (ESOPHAGOGASTRODUODENOSCOPY);  Surgeon: Jovanny Saldivar MD;  Location: [facility] ENDO;  Service: Endoscopy;  Laterality: N/A;    ESOPHAGOSCOPY, USING BOUGIE, WITH DILATION N/A 1/3/2024    Procedure: ESOPHAGOSCOPY, USING BOUGIE, WITH DILATION;  Surgeon: Mir Belle MD;  Location: [facility] OR;  Service: ENT;  Laterality: N/A;    FRACTURE SURGERY Left 05/02/2022    ankle    HERNIA REPAIR      HYSTERECTOMY      INCISIONAL HERNIA REPAIR      INJECTION OF ANESTHETIC AGENT AROUND NERVE Right 5/27/2019    Procedure: RIGHT L5-S3 MEDIAL BRANCH BLOCKS;  Surgeon: Sneha Aragon MD;  Location: [facility]  OR;  Service: Pain Management;  Laterality: Right;  **DO NOT STOP PLAVIX**    INJECTION OF JOINT Right 2021    Procedure: Injection, Joint; Right SI Joint Injection;  Surgeon: Socorro Lorenz MD;  Location: [facility] OR;  Service: Pain Management;  Laterality: Right;  Oral    INSERTION OF DORSAL COLUMN NERVE STIMULATOR FOR TRIAL N/A 2021    Procedure: INSERTION, NEUROSTIMULATOR, SPINAL CORD, DORSAL COLUMN, FOR TRIAL;  Surgeon: Socorro Lorenz MD;  Location: [facility] OR;  Service: Pain Management;  Laterality: N/A;  nevro rep confirmed start time    instestine      bowel section    KNEE ARTHROPLASTY Right 2019    Procedure: ARTHROPLASTY, KNEE;  Surgeon: Ike Briceño II, MD;  Location: [facility] OR;  Service: Orthopedics;  Laterality: Right;    KNEE SURGERY  1983    OOPHORECTOMY      PARATHYROID GLAND SURGERY      3 surgeries    RADIOFREQUENCY ABLATION Right 6/10/2019    Procedure: Radiofrequency Ablation - RIGHT L3-5 RADIOFREQUENCY ABLATION WITH HALYARD COOLIEF THERMAL SYSTEM;  Surgeon: Sneha Aragon MD;  Location: [facility] OR;  Service: Pain Management;  Laterality: Right;  **HOLD PLAVIX x 7 DAYS PRIOR**    SPINAL CORD STIMULATOR REMOVAL N/A 2024    Procedure: REMOVAL, NEUROSTIMULATOR, SPINAL;  Surgeon: Maurilio Carroll MD;  Location: [facility] OR;  Service: Pain Management;  Laterality: N/A;  removal of spinal cord stimulator    UPPER GASTROINTESTINAL ENDOSCOPY       Social History     Tobacco Use    Smoking status: Former     Current packs/day: 0.00     Average packs/day: 1 pack/day for 46.0 years (46.0 ttl pk-yrs)     Types: Cigarettes     Start date: 1977     Quit date: 2023     Years since quittin.8    Smokeless tobacco: Never   Substance Use Topics    Alcohol use: Not Currently    Drug use: No     Family History   Problem Relation Name Age of Onset    Stroke Maternal Grandmother      Cancer Maternal Grandfather      Stroke Mother      Heart disease Father      Breast cancer Sister       Review of  "patient's allergies indicates:   Allergen Reactions    Aspirin Other (See Comments)     "Makes stomach feel like it's on fire"    Phenergan [promethazine] Other (See Comments)     SEIZURES    Nickel     Lortab [hydrocodone-acetaminophen] Itching     Able to take generic Norco          Review of Systems:  a review of eleven systems covering constitutional, Eye, HEENT, Psych, Respiratory, Cardiac, GI, , Musculoskeletal, Endocrine, Dermatologic was negative except for pertinent findings as listed ABOVE and below:  pertinent positives as above, rest good        Exam:Comprehensive exam done. BP (!) 155/93 (BP Location: Right arm, Patient Position: Sitting)   Pulse 68   Ht 5' 7" (1.702 m)   Wt 89.3 kg (196 lb 12.2 oz)   SpO2 96% Comment: on room air at rest  BMI 30.82 kg/m²   Exam included Vitals as listed, and patient's appearance and affect and alertness and mood, oral exam for yeast and hygiene and pharynx lesions and Mallapatti (M) score, neck with inspection for jvd and masses and thyroid abnormalities and lymph nodes (supraclavicular and infraclavicular nodes and axillary also examined and noted if abn), chest exam included symmetry and effort and fremitus and percussion and auscultation, cardiac exam included rhythm and gallops and murmur and rubs and jvd and edema, abdominal exam for mass and hepatosplenomegaly and tenderness and hernias and bowel sounds, Musculoskeletal exam with muscle tone and posture and mobility/gait and  strength, and skin for rashes and cyanosis and pallor and turgor, extremity for clubbing.  Findings were normal except for pertinent findings listed below:  M4, chest is symmetric, no distress, normal percussion, normal fremitus and good normal breath sounds          Radiographs (ct chest and cxr) reviewed: view by direct vision      Labs reviewed           PFT w   spirometry, lung volume by body box, diffusion were performed May 22, 2024. There is no airflow obstruction " measurable  on spirometry. The forced vital capacity was 64% predicted. Lung volume by body box show total lung capacity be 79%  predicted. Diffusion was low at 41% predicted.  Is a restrictive process measured. Diffusion is low. There was no measurable airflow obstruction. Bronchodilator was not  given. Clinical correlation recommended.    Plan:  Clinical impression is apparently straight forward and impression with management as below.     Alessia was seen today for follow-up and copd.    Diagnoses and all orders for this visit:    Solitary pulmonary nodule  -     CT Chest Without Contrast; Future            Follow up in about 6 months (around 5/6/2025), or if symptoms worsen or fail to improve.    Discussed with patient above for education the following:      Patient Instructions   Breathing test was okay in May .  You should tolerate knee surgery --  you have c diff risk.    Preventive antibiotic may be good if antibiotic needed..    Lung nodule seen in May -- need follow up.    Special bronchoscopy - navigational bronchoscopy- may be needed.  Could be done [facility] or [place]..      Will do ct chest next week and arrange navigational bronchoscopy for Jan 2025 at [facility] --as you are to have knee replacement in a week...

## 2024-11-06 NOTE — PROGRESS NOTES
Subjective:      Alessia Nelson is a 69 y.o. female who presents to the office today for a preoperative consultation at the request of surgeon Dr. Michele Singh who plans on performing Right knee replacement on November 19. This consultation is requested for the specific conditions prompting preoperative evaluation (i.e. because of potential affect on operative risk). Planned anesthesia is spinal. The patient has the following known anesthesia issues:  had an episode of hypertension during an emergency surgery . Patient has a bleeding risk of: no recent abnormal bleeding and no remote history of abnormal bleeding.     The following portions of the patient's history were reviewed and updated as appropriate: She does not have any pertinent problems on file.  She  has a past surgical history that includes Parathyroid gland surgery; instestine; Hernia repair; Hysterectomy; Cholecystectomy; Adrenal gland surgery; Colonoscopy; Upper gastrointestinal endoscopy; Incisional hernia repair; Injection of anesthetic agent around nerve (Right, 5/27/2019); Radiofrequency ablation (Right, 6/10/2019); Coronary stent placement; Appendectomy; Back surgery; Cystourethroscopy (N/A, 11/13/2019); Endoscopic ultrasound of upper gastrointestinal tract (N/A, 11/25/2019); Knee surgery (1983); Knee Arthroplasty (Right, 12/18/2019); Endoscopic ultrasound of upper gastrointestinal tract (N/A, 11/2/2020); Oophorectomy; Caudal epidural steroid injection (N/A, 3/1/2021); Insertion of dorsal column nerve stimulator for trial (N/A, 6/7/2021); Esophagogastroduodenoscopy (N/A, 6/10/2021); Colonoscopy (N/A, 6/10/2021); Injection of joint (Right, 9/22/2021); Epidural steroid injection into cervical spine (N/A, 12/8/2021); Endoscopic ultrasound of lower gastrointestinal tract (N/A, 3/8/2022); Colonoscopy (N/A, 3/8/2022); Fracture surgery (Left, 05/02/2022); Epidural steroid injection into cervical spine (N/A, 11/30/2023); Direct diagnostic laryngoscopy  with bronchoscopy and esophagoscopy (N/A, 1/3/2024); esophagoscopy, using bougie, with dilation (N/A, 1/3/2024); Spinal cord stimulator removal (N/A, 2/21/2024); and Esophagogastroduodenoscopy (N/A, 9/26/2024).  She  reports that she quit smoking about 22 months ago. Her smoking use included cigarettes. She started smoking about 47 years ago. She has a 46 pack-year smoking history. She has never used smokeless tobacco. She reports that she does not currently use alcohol. She reports that she does not use drugs.  She has a current medication list which includes the following prescription(s): acetaminophen, allopurinol, amlodipine, buspirone, calcium citrate-vitamin d3 315-200 mg, clopidogrel, colestipol, cyanocobalamin, escitalopram oxalate, fluticasone propionate, furosemide, gabapentin, hydralazine, levothyroxine, magnesium oxide, methocarbamol, metoprolol succinate, pantoprazole, rosuvastatin, tramadol, trazodone, vit a/vit c/vit e/zinc/copper, vitamin d, zenpep, aluminum-magnesium hydroxide-simethicone, and methylprednisolone, and the following Facility-Administered Medications: oxycodone.  She is allergic to aspirin, phenergan [promethazine], nickel, and lortab [hydrocodone-acetaminophen]..    Review of Systems  Review of Systems   Constitutional:  Negative for malaise/fatigue.   Respiratory:  Negative for shortness of breath.    Cardiovascular:  Negative for chest pain and leg swelling.   Gastrointestinal:  Negative for abdominal pain and blood in stool.   Genitourinary:  Negative for dysuria.   Musculoskeletal:  Positive for back pain and joint pain.   Skin:  Negative for rash.   Neurological:  Negative for headaches.   Psychiatric/Behavioral:  Negative for depression. The patient is not nervous/anxious.             Objective:      Physical Exam  Physical Exam  Constitutional:       General: She is not in acute distress.     Appearance: Normal appearance. She is not ill-appearing.   Eyes:       Conjunctiva/sclera: Conjunctivae normal.   Cardiovascular:      Rate and Rhythm: Normal rate and regular rhythm.      Heart sounds: Normal heart sounds. No murmur heard.  Pulmonary:      Effort: Pulmonary effort is normal. No respiratory distress.      Breath sounds: Normal breath sounds. No wheezing or rales.   Musculoskeletal:      Right lower leg: No edema.      Left lower leg: No edema.   Skin:     General: Skin is warm and dry.   Neurological:      Mental Status: She is alert. Mental status is at baseline.      Gait: Gait normal.   Psychiatric:         Mood and Affect: Mood normal.         Behavior: Behavior normal.         Thought Content: Thought content normal.         Judgment: Judgment normal.         Cardiographics  ECG: 10/31/24- Normal sinus rhythm   Normal ECG   When compared with ECG of 24-SEP-2024 14:35,   No significant change was found     Echocardiogram: 6/6/24-    Left Ventricle: The left ventricle is normal in size. Normal wall thickness. There is normal systolic function with a visually estimated ejection fraction of 60 - 65%. There is normal diastolic function.    Right Ventricle: Normal right ventricular cavity size. Systolic function is normal.    Aortic Valve: The aortic valve is a trileaflet valve.    IVC/SVC: Normal venous pressure at 3 mmHg.    Pericardium: There is a small effusion. No indication of cardiac tamponade. Evidence includes no IVC dilation, no chamber collapse.    Imaging  Chest x-ray: 9/24/24-  Heart size is normal.  The mediastinum is unremarkable.  No infiltrates or effusions are identified.  No acute osseous abnormality is demonstrated.  Changes of anterior instrumented fusion are noted in the lower cervical spine.     Impression:     1. No acute radiographic abnormalities.    Lab Review   Office Visit on 10/31/2024   Component Date Value    QRS Duration 10/31/2024 94     [facility] QTC Calculation 10/31/2024 446    Lab Visit on 10/07/2024   Component Date Value    CRP  10/07/2024 <0.50     Sed Rate 10/07/2024 16     Miscellaneous Test Name 10/07/2024 See BELOW     Specimen Type 10/07/2024 Blood     Test Result 10/07/2024 See result image under hyperlink     Reference Lab 10/07/2024 Orthopedic Analysis via [facility]    No results displayed because visit has over 200 results.      Lab Visit on 07/17/2024   Component Date Value    CLARK Screen 07/17/2024 Negative <1:80     CRP 07/17/2024 1.7     Rheumatoid Factor 07/17/2024 <13.0     Sed Rate 07/17/2024 23 (H)     Uric Acid 07/17/2024 3.2     WBC 07/17/2024 7.73     RBC 07/17/2024 3.63 (L)     Hemoglobin 07/17/2024 10.8 (L)     Hematocrit 07/17/2024 35.8 (L)     MCV 07/17/2024 99 (H)     MCH 07/17/2024 29.8     MCHC 07/17/2024 30.2 (L)     RDW 07/17/2024 14.6 (H)     Platelets 07/17/2024 199     MPV 07/17/2024 9.0 (L)     Immature Granulocytes 07/17/2024 0.3     Gran # (ANC) 07/17/2024 4.4     Immature Grans (Abs) 07/17/2024 0.02     Lymph # 07/17/2024 2.3     Mono # 07/17/2024 0.7     Eos # 07/17/2024 0.2     Baso # 07/17/2024 0.06     nRBC 07/17/2024 0     Gran % 07/17/2024 56.5     Lymph % 07/17/2024 30.3     Mono % 07/17/2024 9.1     Eosinophil % 07/17/2024 3.0     Basophil % 07/17/2024 0.8     Differential Method 07/17/2024 Automated    Hospital Outpatient Visit on 06/06/2024   Component Date Value    BSA 06/06/2024 2.04     Est. RA pres 06/06/2024 3    Hospital Outpatient Visit on 05/22/2024   Component Date Value    POC Glucose 05/22/2024 107    Hospital Outpatient Visit on 05/22/2024   Component Date Value    Pre FVC 05/22/2024 2.08 (L)     Pre FEV1 05/22/2024 1.57 (L)     Pre FEV1 FVC 05/22/2024 75.57     Pre FEF 25 75 05/22/2024 1.21     Pre PEF 05/22/2024 5.08     Pre  05/22/2024 8.54     Pre MVV 05/22/2024 46.59 (L)     Pre DLCO 05/22/2024 9.76 (L)     DLCOVA PRE 05/22/2024 2.96 (L)     VA PRE 05/22/2024 3.30 (L)     IVC PRE 05/22/2024 1.95 (L)     Pre TLC 05/22/2024 4.28 (L)     VC PRE 05/22/2024 2.08 (L)     Pre FRC PL  05/22/2024 2.73     Pre ERV 05/22/2024 0.53     Pre RV 05/22/2024 2.21     RVTLC PRE 05/22/2024 51.53 (H)     Raw PRE 05/22/2024 3.75 (H)     FVC Ref 05/22/2024 3.25     FVC LLN 05/22/2024 2.39     FVC Pre Ref 05/22/2024 63.9     FEV1 Ref 05/22/2024 2.51     FEV1 LLN 05/22/2024 1.85     FEV1 Pre Ref 05/22/2024 62.5     FEV1 FVC Ref 05/22/2024 78     FEV1 FVC LLN 05/22/2024 65     FEV1 FVC Pre Ref 05/22/2024 96.8     FEF 25 75 Ref 05/22/2024 2.09     FEF 25 75 LLN 05/22/2024 0.99     FEF 25 75 Pre Ref 05/22/2024 57.8     PEF Ref 05/22/2024 6.33     PEF LLN 05/22/2024 4.46     PEF Pre Ref 05/22/2024 80.3     MVV Ref 05/22/2024 98     MVV LLN 05/22/2024 83     MVV Pre Ref 05/22/2024 47.6     TLC Ref 05/22/2024 5.44     TLC LLN 05/22/2024 4.45     TLC Pre Ref 05/22/2024 78.7     VC Ref 05/22/2024 3.25     VC LLN 05/22/2024 2.39     VC Pre Ref 05/22/2024 63.9     FRCpleth Ref 05/22/2024 2.88     FRCpleth LLN 05/22/2024 2.06     FRCpleth PreRef 05/22/2024 94.9     ERV Ref 05/22/2024 0.73     ERV LLN 05/22/2024 -46891.27     ERV Pre Ref 05/22/2024 72.4     RV Ref 05/22/2024 2.15     RV LLN 05/22/2024 1.58     RV Pre Ref 05/22/2024 102.5     RVTLC Ref 05/22/2024 42     RVTLC LLN 05/22/2024 32     RVTLC Pre Ref 05/22/2024 123.5     Raw Ref 05/22/2024 3.06     Raw LLN 05/22/2024 3.06     Raw Pre Ref 05/22/2024 122.5     DLCO Single Breath Ref 05/22/2024 23.58     DLCO Single Breath LLN 05/22/2024 17.85     DLCO Single Breath Pre R* 05/22/2024 41.4     DLCOc Single Breath Ref 05/22/2024 23.58     DLCOc Single Breath LLN 05/22/2024 17.85     DLCOVA Ref 05/22/2024 4.33     DLCOVA LLN 05/22/2024 3.02     DLCOVA Pre Ref 05/22/2024 68.3     DLCOc SBVA Ref 05/22/2024 4.33     DLCOc SBVA LLN 05/22/2024 3.02     VA Single Breath Ref 05/22/2024 5.29     VA Single Breath LLN 05/22/2024 5.29     VA Single Breath Pre Ref 05/22/2024 62.3     IVC Single Breath Ref 05/22/2024 3.25     IVC Single Breath LLN 05/22/2024 2.39     IVC Single  "Breath Pre Ref 05/22/2024 60.1    Lab Visit on 05/20/2024   Component Date Value    Cholesterol 05/20/2024 131     Triglycerides 05/20/2024 276 (H)     HDL 05/20/2024 48     LDL Cholesterol 05/20/2024 27.8 (L)     HDL/Cholesterol Ratio 05/20/2024 36.6     Total Cholesterol/HDL Ra* 05/20/2024 2.7     Non-HDL Cholesterol 05/20/2024 83           Assessment:      69 y.o. female with planned surgery as above.    Known risk factors for perioperative complications: Anemia  Coronary disease  Congestive heart failure  Chronic pulmonary disease  Renal dysfunction      Cardiac Risk Estimation: per the Revised Cardiac Risk Index (Circ. 100:1043, 1999), the patient's risk factors for cardiac complications include history of congestive heart failure and history of ischemic heart disease, putting her in: RCI RISK CLASS III (2 risk factors, risk of major cardiac compl. appr. 3.6%)    Current medications which may produce withdrawal symptoms if withheld perioperatively: ultram.       Plan:      1. Preoperative workup as follows none.  2. Change in medication regimen before surgery: none, continue medication regimen including morning of surgery, with sip of water.  3. Invasive hemodynamic monitoring perioperatively: should be considered.  4. Deep vein thrombosis prophylaxis postoperatively:regimen to be chosen by surgical team.  5. Surveillance for postoperative MI with ECG immediately postoperatively and on postoperative days 1 and 2 AND troponin levels 24 hours postoperatively and on day 4 or hospital discharge (whichever comes first): should be considered.    Cleared by cardiology on 10/31/24 -Dr. Fowler  "No absolute major cardiac contraindications at this time for noncardiac surgery. The patient is at elevated risk of perioperative adverse cardiac events due to her history of cardiac disease. Follow standard of care cardiac and hemodynamic monitoring during surgery and in the post-operative period. Ok to hold Plavix 5-7 days " "prior to surgery. "    From a primary care standpoint it is reasonable to proceed with surgery given her need for surgery.  She is moderate risk for complication secondary to her heart failure/cad.    "

## 2024-11-06 NOTE — PATIENT INSTRUCTIONS
Breathing test was okay in May .  You should tolerate knee surgery --  you have c diff risk.    Preventive antibiotic may be good if antibiotic needed..    Lung nodule seen in May -- need follow up.    Special bronchoscopy - navigational bronchoscopy- may be needed.  Could be done [facility] or [place]..      Will do ct chest next week and arrange navigational bronchoscopy for Jan 2025 at [facility] --as you are to have knee replacement in a week...

## 2024-11-07 ENCOUNTER — CLINICAL SUPPORT (OUTPATIENT)
Dept: REHABILITATION | Facility: HOSPITAL | Age: 69
End: 2024-11-07
Payer: MEDICARE

## 2024-11-07 ENCOUNTER — LAB VISIT (OUTPATIENT)
Dept: LAB | Facility: HOSPITAL | Age: 69
End: 2024-11-07
Attending: STUDENT IN AN ORGANIZED HEALTH CARE EDUCATION/TRAINING PROGRAM
Payer: MEDICARE

## 2024-11-07 DIAGNOSIS — Z86.19 HISTORY OF CLOSTRIDIUM DIFFICILE INFECTION: ICD-10-CM

## 2024-11-07 DIAGNOSIS — K21.9 GERD (GASTROESOPHAGEAL REFLUX DISEASE): Chronic | ICD-10-CM

## 2024-11-07 DIAGNOSIS — Z74.09 IMPAIRED FUNCTIONAL MOBILITY AND ACTIVITY TOLERANCE: ICD-10-CM

## 2024-11-07 DIAGNOSIS — E66.811 CLASS 1 OBESITY DUE TO EXCESS CALORIES WITH SERIOUS COMORBIDITY AND BODY MASS INDEX (BMI) OF 30.0 TO 30.9 IN ADULT: ICD-10-CM

## 2024-11-07 DIAGNOSIS — R29.6 MULTIPLE FALLS: ICD-10-CM

## 2024-11-07 DIAGNOSIS — S36.222A: ICD-10-CM

## 2024-11-07 DIAGNOSIS — M54.40 CHRONIC LOW BACK PAIN WITH SCIATICA, SCIATICA LATERALITY UNSPECIFIED, UNSPECIFIED BACK PAIN LATERALITY: Primary | ICD-10-CM

## 2024-11-07 DIAGNOSIS — H04.123 DRY EYES: ICD-10-CM

## 2024-11-07 DIAGNOSIS — R19.7 DIARRHEA: ICD-10-CM

## 2024-11-07 DIAGNOSIS — M47.817 LUMBOSACRAL SPONDYLOSIS: ICD-10-CM

## 2024-11-07 DIAGNOSIS — R93.2 ABNORMAL COMPUTERIZED TOMOGRAPHY OF BILIARY TRACT: ICD-10-CM

## 2024-11-07 DIAGNOSIS — M25.561 CHRONIC PAIN OF RIGHT KNEE: ICD-10-CM

## 2024-11-07 DIAGNOSIS — J30.9 ALLERGIC RHINITIS: ICD-10-CM

## 2024-11-07 DIAGNOSIS — G24.3 ISOLATED CERVICAL DYSTONIA: ICD-10-CM

## 2024-11-07 DIAGNOSIS — M54.16 LUMBAR RADICULOPATHY: ICD-10-CM

## 2024-11-07 DIAGNOSIS — E03.4 HYPOTHYROIDISM DUE TO ACQUIRED ATROPHY OF THYROID: ICD-10-CM

## 2024-11-07 DIAGNOSIS — Z96.1 PSEUDOPHAKIA: ICD-10-CM

## 2024-11-07 DIAGNOSIS — M54.59 PAIN OF LUMBAR FACET JOINT: ICD-10-CM

## 2024-11-07 DIAGNOSIS — Z85.828 HISTORY OF MALIGNANT NEOPLASM OF SKIN: ICD-10-CM

## 2024-11-07 DIAGNOSIS — G89.29 CHRONIC LOW BACK PAIN WITH SCIATICA, SCIATICA LATERALITY UNSPECIFIED, UNSPECIFIED BACK PAIN LATERALITY: Primary | ICD-10-CM

## 2024-11-07 DIAGNOSIS — Z78.9 EXCESSIVE CAFFEINE INTAKE: ICD-10-CM

## 2024-11-07 DIAGNOSIS — G89.29 CHRONIC CERVICAL PAIN: ICD-10-CM

## 2024-11-07 DIAGNOSIS — R68.89 ACTIVITY INTOLERANCE: ICD-10-CM

## 2024-11-07 DIAGNOSIS — M54.50 CHRONIC BILATERAL LOW BACK PAIN WITHOUT SCIATICA: ICD-10-CM

## 2024-11-07 DIAGNOSIS — E83.52 HYPERCALCEMIA: ICD-10-CM

## 2024-11-07 DIAGNOSIS — E03.9 HYPOTHYROIDISM: ICD-10-CM

## 2024-11-07 DIAGNOSIS — M96.1 FAILED BACK SYNDROME OF LUMBAR SPINE: ICD-10-CM

## 2024-11-07 DIAGNOSIS — Z01.811 PRE-OP CHEST EXAM: ICD-10-CM

## 2024-11-07 DIAGNOSIS — G89.29 CHRONIC PAIN OF RIGHT KNEE: ICD-10-CM

## 2024-11-07 DIAGNOSIS — R55 SYNCOPE AND COLLAPSE: ICD-10-CM

## 2024-11-07 DIAGNOSIS — I50.9 CHF, CHRONIC: ICD-10-CM

## 2024-11-07 DIAGNOSIS — I11.0 LVH (LEFT VENTRICULAR HYPERTROPHY) DUE TO HYPERTENSIVE DISEASE, WITH HEART FAILURE: Chronic | ICD-10-CM

## 2024-11-07 DIAGNOSIS — E83.42 HYPOMAGNESEMIA: ICD-10-CM

## 2024-11-07 DIAGNOSIS — K86.1 CHRONIC PANCREATITIS: ICD-10-CM

## 2024-11-07 DIAGNOSIS — G62.9 PERIPHERAL NEUROPATHY: ICD-10-CM

## 2024-11-07 DIAGNOSIS — E78.5 HYPERLIPIDEMIA: ICD-10-CM

## 2024-11-07 DIAGNOSIS — L81.6 POIKILODERMA: ICD-10-CM

## 2024-11-07 DIAGNOSIS — G43.709 NON-REFRACTORY CHRONIC MIGRAINE WITHOUT AURA: ICD-10-CM

## 2024-11-07 DIAGNOSIS — Z96.651 STATUS POST RIGHT KNEE REPLACEMENT: ICD-10-CM

## 2024-11-07 DIAGNOSIS — M71.21 BAKER'S CYST OF KNEE, RIGHT: ICD-10-CM

## 2024-11-07 DIAGNOSIS — M17.11 UNILATERAL PRIMARY OSTEOARTHRITIS, RIGHT KNEE: ICD-10-CM

## 2024-11-07 DIAGNOSIS — F41.1 GENERALIZED ANXIETY DISORDER: ICD-10-CM

## 2024-11-07 DIAGNOSIS — I25.10 CAD (CORONARY ARTERY DISEASE): ICD-10-CM

## 2024-11-07 DIAGNOSIS — R00.1 BRADYCARDIA: ICD-10-CM

## 2024-11-07 DIAGNOSIS — E53.8 B12 DEFICIENCY: ICD-10-CM

## 2024-11-07 DIAGNOSIS — K57.30 DIVERTICULOSIS OF LARGE INTESTINE WITHOUT HEMORRHAGE: ICD-10-CM

## 2024-11-07 DIAGNOSIS — J44.9 COPD (CHRONIC OBSTRUCTIVE PULMONARY DISEASE): Chronic | ICD-10-CM

## 2024-11-07 DIAGNOSIS — N18.32 STAGE 3B CHRONIC KIDNEY DISEASE: ICD-10-CM

## 2024-11-07 DIAGNOSIS — M17.11 PRIMARY OSTEOARTHRITIS OF RIGHT KNEE: ICD-10-CM

## 2024-11-07 DIAGNOSIS — F17.200 CURRENT SMOKER: Chronic | ICD-10-CM

## 2024-11-07 DIAGNOSIS — E31.20 MEN (MULTIPLE ENDOCRINE NEOPLASIA): Chronic | ICD-10-CM

## 2024-11-07 DIAGNOSIS — M79.7 FIBROMYALGIA: ICD-10-CM

## 2024-11-07 DIAGNOSIS — M67.80 TENDINOSIS: ICD-10-CM

## 2024-11-07 DIAGNOSIS — I10 HTN (HYPERTENSION): Chronic | ICD-10-CM

## 2024-11-07 DIAGNOSIS — Z87.19 HISTORY OF PANCREATITIS: ICD-10-CM

## 2024-11-07 DIAGNOSIS — Z82.49 FAMILY HISTORY OF PREMATURE CAD: ICD-10-CM

## 2024-11-07 DIAGNOSIS — M46.1 SACROILIITIS: ICD-10-CM

## 2024-11-07 DIAGNOSIS — M54.2 CHRONIC CERVICAL PAIN: ICD-10-CM

## 2024-11-07 DIAGNOSIS — G89.29 CHRONIC BILATERAL LOW BACK PAIN WITHOUT SCIATICA: ICD-10-CM

## 2024-11-07 DIAGNOSIS — K83.9 BILE DUCT ABNORMALITY: ICD-10-CM

## 2024-11-07 DIAGNOSIS — D64.9 ANEMIA: ICD-10-CM

## 2024-11-07 DIAGNOSIS — S46.009A ROTATOR CUFF INJURY: ICD-10-CM

## 2024-11-07 DIAGNOSIS — M77.40 METATARSALGIA: ICD-10-CM

## 2024-11-07 DIAGNOSIS — Z78.9 ASPIRIN INTOLERANCE: ICD-10-CM

## 2024-11-07 DIAGNOSIS — Z95.5 S/P DRUG ELUTING CORONARY STENT PLACEMENT: Chronic | ICD-10-CM

## 2024-11-07 DIAGNOSIS — I95.1 ORTHOSTATIC HYPOTENSION: ICD-10-CM

## 2024-11-07 DIAGNOSIS — F17.200 NICOTINE DEPENDENCE: ICD-10-CM

## 2024-11-07 DIAGNOSIS — M23.203 DEGENERATIVE TEAR OF MEDIAL MENISCUS OF RIGHT KNEE: ICD-10-CM

## 2024-11-07 DIAGNOSIS — M10.9 GOUT: ICD-10-CM

## 2024-11-07 DIAGNOSIS — Z90.49 HISTORY OF CHOLECYSTECTOMY: ICD-10-CM

## 2024-11-07 DIAGNOSIS — E66.09 CLASS 1 OBESITY DUE TO EXCESS CALORIES WITH SERIOUS COMORBIDITY AND BODY MASS INDEX (BMI) OF 30.0 TO 30.9 IN ADULT: ICD-10-CM

## 2024-11-07 DIAGNOSIS — Z12.11 COLON CANCER SCREENING: ICD-10-CM

## 2024-11-07 DIAGNOSIS — K86.89 PANCREATIC INSUFFICIENCY: Chronic | ICD-10-CM

## 2024-11-07 DIAGNOSIS — R93.3 ABNORMAL FINDING ON GI TRACT IMAGING: ICD-10-CM

## 2024-11-07 DIAGNOSIS — Z86.73 HISTORY OF TIA (TRANSIENT ISCHEMIC ATTACK): ICD-10-CM

## 2024-11-07 DIAGNOSIS — E65 ABDOMINAL OBESITY: ICD-10-CM

## 2024-11-07 DIAGNOSIS — M51.369 DEGENERATIVE DISC DISEASE, LUMBAR: ICD-10-CM

## 2024-11-07 DIAGNOSIS — N18.32 STAGE 3B CHRONIC KIDNEY DISEASE: Primary | ICD-10-CM

## 2024-11-07 DIAGNOSIS — F33.0 MILD EPISODE OF RECURRENT MAJOR DEPRESSIVE DISORDER: ICD-10-CM

## 2024-11-07 LAB
CREAT SERPL-MCNC: 1.3 MG/DL (ref 0.5–1.4)
EST. GFR  (NO RACE VARIABLE): 44.8 ML/MIN/1.73 M^2

## 2024-11-07 PROCEDURE — 97161 PT EVAL LOW COMPLEX 20 MIN: CPT | Mod: PN

## 2024-11-07 PROCEDURE — 36415 COLL VENOUS BLD VENIPUNCTURE: CPT | Performed by: STUDENT IN AN ORGANIZED HEALTH CARE EDUCATION/TRAINING PROGRAM

## 2024-11-07 PROCEDURE — 82565 ASSAY OF CREATININE: CPT | Performed by: STUDENT IN AN ORGANIZED HEALTH CARE EDUCATION/TRAINING PROGRAM

## 2024-11-07 PROCEDURE — 97140 MANUAL THERAPY 1/> REGIONS: CPT | Mod: PN

## 2024-11-07 NOTE — PLAN OF CARE
[facility]   Physical Therapy Initial Evaluation      Name: Alessia Nelson  Clinic Number: [id number]    Therapy Diagnosis:   Encounter Diagnoses   Name Primary?    Chronic low back pain with sciatica, sciatica laterality unspecified, unspecified back pain laterality Yes    Impaired functional mobility and activity tolerance         Physician: Freddy Shay MD    Physician Orders: PT Eval and Treat   Medical Diagnosis from Referral:     Evaluation Date: 11/7/2024  Authorization Period Expiration: 12/31/2024  Plan of Care Expiration: 12/31/2024  Progress Note Due: 12/1/2024  Date of Surgery: n/a   Visit # / Visits authorized: 1/ 1   FOTO: 1/ 3    Precautions: Standard     Time In: 7:15 am   Time Out: ***  Total Billable Time: *** minutes    Subjective     Date of onset: Chronic     History of current condition - Alessia reports: ***    Falls: No falls     Imaging: MRI studies, CT scan films: patient [facility] [place] - nothing on file as far as report     Prior Therapy: yes -   Social History: lives alone    Occupation: retired   Prior Level of Function: Independent;   Current Level of Function: Independent, but increased difficulty     Pain:  Current 7/10, worst 9/10, best 7/10   Location: bilateral  mid thoracic, pain extended to left scapula   Description: Throbbing, Grabbing, Tight, and Tingling  Aggravating Factors: standing tolerance < 30 mins, finds she has to bend forward to get comfortable,   Easing Factors: using medical marijuana     Patients goals: To try and relieve some of this pain before I have my knee surgery.      Medical History:   Past Medical History:   Diagnosis Date    Acute pancreatitis     Back pain     CAD (coronary artery disease)     CHF (congestive heart failure)     COPD (chronic obstructive pulmonary disease)     Depression     Diverticulosis     Encounter for blood transfusion     GERD (gastroesophageal reflux disease)     History of blood clots     1986  AFTER BOWEL RESCETION    History of bowel resection     Hyperlipidemia     Hypertension     Peritonitis     Pulmonary nodule     Seizures     HAD A SEIZURE FROM PHENERGAN     Stroke     Thyroid disease     TIA (transient ischemic attack)     Wears dentures     upper       Surgical History:   Alessia Nelson  has a past surgical history that includes Parathyroid gland surgery; instestine; Hernia repair; Hysterectomy; Cholecystectomy; Adrenal gland surgery; Colonoscopy; Upper gastrointestinal endoscopy; Incisional hernia repair; Injection of anesthetic agent around nerve (Right, 5/27/2019); Radiofrequency ablation (Right, 6/10/2019); Coronary stent placement; Appendectomy; Back surgery; Cystourethroscopy (N/A, 11/13/2019); Endoscopic ultrasound of upper gastrointestinal tract (N/A, 11/25/2019); Knee surgery (1983); Knee Arthroplasty (Right, 12/18/2019); Endoscopic ultrasound of upper gastrointestinal tract (N/A, 11/2/2020); Oophorectomy; Caudal epidural steroid injection (N/A, 3/1/2021); Insertion of dorsal column nerve stimulator for trial (N/A, 6/7/2021); Esophagogastroduodenoscopy (N/A, 6/10/2021); Colonoscopy (N/A, 6/10/2021); Injection of joint (Right, 9/22/2021); Epidural steroid injection into cervical spine (N/A, 12/8/2021); Endoscopic ultrasound of lower gastrointestinal tract (N/A, 3/8/2022); Colonoscopy (N/A, 3/8/2022); Fracture surgery (Left, 05/02/2022); Epidural steroid injection into cervical spine (N/A, 11/30/2023); Direct diagnostic laryngoscopy with bronchoscopy and esophagoscopy (N/A, 1/3/2024); esophagoscopy, using bougie, with dilation (N/A, 1/3/2024); Spinal cord stimulator removal (N/A, 2/21/2024); and Esophagogastroduodenoscopy (N/A, 9/26/2024).    Medications:   Alessia has a current medication list which includes the following prescription(s): acetaminophen, allopurinol, aluminum-magnesium hydroxide-simethicone, amlodipine, buspirone, calcium citrate-vitamin d3 315-200 mg, clopidogrel,  "colestipol, cyanocobalamin, escitalopram oxalate, fluticasone propionate, furosemide, gabapentin, hydralazine, levothyroxine, magnesium oxide, methocarbamol, methylprednisolone, metoprolol succinate, pantoprazole, rosuvastatin, tramadol, trazodone, vit a/vit c/vit e/zinc/copper, vitamin d, and zenpep, and the following Facility-Administered Medications: oxycodone.    Allergies:   Review of patient's allergies indicates:   Allergen Reactions    Aspirin Other (See Comments)     "Makes stomach feel like it's on fire"    Phenergan [promethazine] Other (See Comments)     SEIZURES    Nickel     Lortab [hydrocodone-acetaminophen] Itching     Able to take generic Norco        Objective      Observation: ***    Posture: {Postural examination/scapula alignment:73591::" "}    Gait: ***    Lumbar Range of Motion:    % of Normal Range Pain   Flexion ***    Extension ***    Left Side Bending ***    Right Side Bending ***    Left rotation ***    Right Rotation ***       Lower Extremity Strength   Left Right   Knee extension: {AMB PT VESTIBULAR STRENGTH:81174} {AMB PT VESTIBULAR STRENGTH:93303}   Knee flexion: {AMB PT VESTIBULAR STRENGTH:02542} {AMB PT VESTIBULAR STRENGTH:15505}   Hip flexion: {AMB PT VESTIBULAR STRENGTH:80450} {AMB PT VESTIBULAR STRENGTH:82342}   Hip extension:  {AMB PT VESTIBULAR STRENGTH:34419} {AMB PT VESTIBULAR STRENGTH:50024}   Hip abduction: {AMB PT VESTIBULAR STRENGTH:97232} {AMB PT VESTIBULAR STRENGTH:61809}   Hip adduction: {AMB PT VESTIBULAR STRENGTH:35718} {AMB PT VESTIBULAR STRENGTH:96501}   Ankle dorsiflexion: {AMB PT VESTIBULAR STRENGTH:15348} {AMB PT VESTIBULAR STRENGTH:61789}   Ankle plantarflexion: {AMB PT VESTIBULAR STRENGTH:71872} {AMB PT VESTIBULAR STRENGTH:80629}   Upper abdominals {AMB PT VESTIBULAR STRENGTH:50086}    Lower abdominals {AMB PT VESTIBULAR STRENGTH:04070}    Back extensors {AMB PT VESTIBULAR STRENGTH:45889}      Special Tests:    Repeated Flexion {POSITIVE/NEGATIVE:58087} "   Repeated Extension {POSITIVE/NEGATIVE:02381}   Prone Instability {POSITIVE/NEGATIVE:34496}   Straight Leg Raise {POSITIVE/NEGATIVE:79930}   Slump {POSITIVE/NEGATIVE:04600}   Quadrant {POSITIVE/NEGATIVE:36273}   Femoral nerve test {POSITIVE/NEGATIVE:37908}       DTR:   Left Right Comment   Patellar (L3-4) {Reflexes:77382} {Reflexes:61192}    Achilles (S1) {Reflexes:93157} {Reflexes:12577}        Joint Mobility:   Lumbar: CPA {RESTRICTED/UNRESTRICTED:72595},   RPA {RESTRICTED/UNRESTRICTED:32699}  LPA {RESTRICTED/UNRESTRICTED:32001}    Thoracic: {RESTRICTED/UNRESTRICTED:63682}    Sacral Evaluation    Standing Forward Flexion: *** PSIS moves first (stuck side)  Standing Stork: *** PSIS with no posterior rotation (stuck side)  Supine to long sit: *** leg changed from (long to short = anterior rotation) / (short to long = posterior rotation)      Palpation: {NO/MINIMAL/MODERATE/SIGNIFICANT:21145} tenderness to palpation at ***    Sensation: ***    Flexibility:     90/90 SLR = R {NO/MINIMAL/MODERATE/SIGNIFICANT:77387} restriction, L {NO/MINIMAL/MODERATE/SIGNIFICANT:87192} restriction   Ely's test: R {NO/MINIMAL/MODERATE/SIGNIFICANT:73237} restriction, L {NO/MINIMAL/MODERATE/SIGNIFICANT:42521} restriction   Prashanth's test: R {NO/MINIMAL/MODERATE/SIGNIFICANT:93386} restriction, L {NO/MINIMAL/MODERATE/SIGNIFICANT:52541} restriction   Bebeto test: R {NO/MINIMAL/MODERATE/SIGNIFICANT:54566} restriction, L {NO/MINIMAL/MODERATE/SIGNIFICANT:12450} restriction    PT Evaluation Completed: {YES:93424}  Discussed Plan of Care with patient: {YES:97703}    Intake Outcome Measure for FOTO *** Survey    Therapist reviewed FOTO scores for Alessia Nelson on 11/7/2024.   FOTO report - see Media section or FOTO account episode details.    Intake Score: ***%         Treatment     Total Treatment time (time-based codes) separate from Evaluation: *** minutes     Alessia received the treatments listed below:      therapeutic exercises to develop  {AMB PT PROGRESS OBJECTIVE:81687} for *** minutes including:  ***    manual therapy techniques: {AMB PT PROGRESS MANUAL THERAPY:32444} were applied to the: *** for *** minutes, including:  ***    neuromuscular re-education activities to improve: {AMB PT PROGRESS NEURO RE-ED:96476} for *** minutes. The following activities were included:  ***    therapeutic activities to improve functional performance for ***  minutes, including:  ***    gait training to improve functional mobility and safety for ***  minutes, including:  ***    direct contact modalities after being cleared for contraindications: {AMB PT PROGRESS DIRECT CONTACT MODES:32483}    supervised modalities after being cleared for contradictions: {AMB PT SUPERVISED MODES:90220}    hot pack for *** minutes to ***.    cold pack for *** minutes to ***.    Patient Education and Home Exercises     Education provided:   - ***    Written Home Exercises Provided: {Home Exercise Program:20914}. Exercises were reviewed and Alessia was able to demonstrate them prior to the end of the session.  Alessia demonstrated {Desc; good/fair/poor:72038} understanding of the education provided. See EMR under Patient Instructions for exercises provided during therapy sessions.    Assessment     Alessia is a 69 y.o. female referred to outpatient Physical Therapy with a medical diagnosis of ***. Patient presents with ***    Patient prognosis is {REHAB PROGNOSIS OHS:48837}.   Patient will benefit from skilled outpatient Physical Therapy to address the deficits stated above and in the chart below, provide patient /family education, and to maximize patientt's level of independence.     Plan of care discussed with patient: {YES:39021}  Patient's spiritual, cultural and educational needs considered and patient is agreeable to the plan of care and goals as stated below:     Anticipated Barriers for therapy: ***    Medical Necessity is demonstrated by the following  History  Co-morbidities and  "personal factors that may impact the plan of care [] LOW: no personal factors / co-morbidities  [] MODERATE: 1-2 personal factors / co-morbidities  [] HIGH: 3+ personal factors / co-morbidities    Moderate / High Support Documentation:   Co-morbidities affecting plan of care: ***    Personal Factors:   {Personal Factors:36536}     Examination  Body Structures and Functions, activity limitations and participation restrictions that may impact the plan of care [] LOW: addressing 1-2 elements  [] MODERATE: 3+ elements  [] HIGH: 4+ elements (please support below)    Moderate / High Support Documentation: ***     Clinical Presentation [] LOW: stable  [] MODERATE: Evolving  [] HIGH: Unstable     Decision Making/ Complexity Score: {Desc; low/moderate/high:156534}       Goals:  Short Term Goals: *** weeks   ***    Long Term Goals: *** weeks   ***  Plan     Plan of care Certification: 11/7/2024 to ***.    Outpatient Physical Therapy {NUMBERS 1-5:35858} times weekly for {0-10:49821::"0"} weeks to include the following interventions: {[place] PLAN:14087}.     Trinh Reyes, PT        Physician's Signature: _________________________________________ Date: ________________  "

## 2024-11-08 ENCOUNTER — CLINICAL SUPPORT (OUTPATIENT)
Dept: REHABILITATION | Facility: HOSPITAL | Age: 69
End: 2024-11-08
Payer: MEDICARE

## 2024-11-08 DIAGNOSIS — Z74.09 IMPAIRED FUNCTIONAL MOBILITY AND ACTIVITY TOLERANCE: Primary | ICD-10-CM

## 2024-11-08 DIAGNOSIS — M54.50 CHRONIC BILATERAL LOW BACK PAIN WITHOUT SCIATICA: ICD-10-CM

## 2024-11-08 DIAGNOSIS — G89.29 CHRONIC BILATERAL LOW BACK PAIN WITHOUT SCIATICA: ICD-10-CM

## 2024-11-08 PROCEDURE — 97113 AQUATIC THERAPY/EXERCISES: CPT | Mod: PN

## 2024-11-08 NOTE — PLAN OF CARE
"[facility]   Physical Therapy Initial Evaluation      Name: Alessia Nelson  Clinic Number: [id number]    Therapy Diagnosis:   Encounter Diagnoses   Name Primary?    Chronic low back pain with sciatica, sciatica laterality unspecified, unspecified back pain laterality Yes    Impaired functional mobility and activity tolerance         Physician: Freddy Shay MD    Physician Orders: PT Eval and Treat   Medical Diagnosis from Referral:   M54.50 - lumbar spine pain   Evaluation Date: 11/7/2024  Authorization Period Expiration: 12/31/2024  Plan of Care Expiration: 12/31/2024  Progress Note Due: 12/1/2024  Date of Surgery: n/a   Visit # / Visits authorized: 1/ 1   FOTO: 1/ 3    Precautions: Standard     Time In: 7:15 am   Time Out: 8:00 am   Total Billable Time: 45 minutes    Subjective     Date of onset: Chronic neck and back pain; Having right knee - re-do TKA on 11/19/2024 by Dr Michele Singh.     History of current condition - Alessia reports: She is having her knee redone on 11/19 - it has caused her nothing but pain since she had her original surgery - turns out she is allergic to nickel.  Alessia also has a history of chronic neck and mid to lower back pain - saw second opinion from Freddy Shay - saw some type of "tumor" in the thoracic spine.  Unsure what type, or if it is a tumor.  Decision made to watch it - see if it gets bigger or causes more of a problem.  More than likely would need to be evaluated with possible resection by specialist. Dr. Shay referred Alessia to PT - not aware that she would be scheduling her knee surgery at this time.  Alessia wanted to come in for evaluation and short course of treatment so that she can maintain as much mobility/flexibility as possible before her knee surgery.     Falls: No falls     Imaging: MRI studies, CT scan films: patient had these at [facility] - nothing on file as far as report     Prior Therapy: yes -   Social History: lives alone    Occupation: " retired   Prior Level of Function: Independent;   Current Level of Function: Independent, but increased difficulty performing daily, household and yard work activities as a result of her pain and limited movement.     Pain:  Current 7/10, worst 9/10, best 7/10   Location: bilateral  mid thoracic, pain extended to left scapula   Description: Throbbing, Grabbing, Tight, and Tingling  Aggravating Factors: standing tolerance < 30 mins, finds she has to bend forward to get comfortable,   Easing Factors: using medical marijuana     Patients goals: To try and relieve some of this pain before I have my knee surgery.      Medical History:   Past Medical History:   Diagnosis Date    Acute pancreatitis     Back pain     CAD (coronary artery disease)     CHF (congestive heart failure)     COPD (chronic obstructive pulmonary disease)     Depression     Diverticulosis     Encounter for blood transfusion     GERD (gastroesophageal reflux disease)     History of blood clots     1986 AFTER BOWEL RESCETION    History of bowel resection     Hyperlipidemia     Hypertension     Peritonitis     Pulmonary nodule     Seizures     HAD A SEIZURE FROM PHENERGAN     Stroke     Thyroid disease     TIA (transient ischemic attack)     Wears dentures     upper       Surgical History:   Alessia Nelson  has a past surgical history that includes Parathyroid gland surgery; instestine; Hernia repair; Hysterectomy; Cholecystectomy; Adrenal gland surgery; Colonoscopy; Upper gastrointestinal endoscopy; Incisional hernia repair; Injection of anesthetic agent around nerve (Right, 5/27/2019); Radiofrequency ablation (Right, 6/10/2019); Coronary stent placement; Appendectomy; Back surgery; Cystourethroscopy (N/A, 11/13/2019); Endoscopic ultrasound of upper gastrointestinal tract (N/A, 11/25/2019); Knee surgery (1983); Knee Arthroplasty (Right, 12/18/2019); Endoscopic ultrasound of upper gastrointestinal tract (N/A, 11/2/2020); Oophorectomy; Caudal  "epidural steroid injection (N/A, 3/1/2021); Insertion of dorsal column nerve stimulator for trial (N/A, 6/7/2021); Esophagogastroduodenoscopy (N/A, 6/10/2021); Colonoscopy (N/A, 6/10/2021); Injection of joint (Right, 9/22/2021); Epidural steroid injection into cervical spine (N/A, 12/8/2021); Endoscopic ultrasound of lower gastrointestinal tract (N/A, 3/8/2022); Colonoscopy (N/A, 3/8/2022); Fracture surgery (Left, 05/02/2022); Epidural steroid injection into cervical spine (N/A, 11/30/2023); Direct diagnostic laryngoscopy with bronchoscopy and esophagoscopy (N/A, 1/3/2024); esophagoscopy, using bougie, with dilation (N/A, 1/3/2024); Spinal cord stimulator removal (N/A, 2/21/2024); and Esophagogastroduodenoscopy (N/A, 9/26/2024).    Medications:   Alessia has a current medication list which includes the following prescription(s): acetaminophen, allopurinol, aluminum-magnesium hydroxide-simethicone, amlodipine, buspirone, calcium citrate-vitamin d3 315-200 mg, clopidogrel, colestipol, cyanocobalamin, escitalopram oxalate, fluticasone propionate, furosemide, gabapentin, hydralazine, levothyroxine, magnesium oxide, methocarbamol, methylprednisolone, metoprolol succinate, pantoprazole, rosuvastatin, tramadol, trazodone, vit a/vit c/vit e/zinc/copper, vitamin d, and zenpep, and the following Facility-Administered Medications: oxycodone.    Allergies:   Review of patient's allergies indicates:   Allergen Reactions    Aspirin Other (See Comments)     "Makes stomach feel like it's on fire"    Phenergan [promethazine] Other (See Comments)     SEIZURES    Nickel     Lortab [hydrocodone-acetaminophen] Itching     Able to take generic Norco        Objective      Observation: Alessia is alert/oriented x 4;  Not in acute distress at this time, but definitely with pain and stiffness.     Posture:    -       Rounded shoulders  -       Forward head  -       Affected scapula elevated  -       Kyphosis    Gait: antalgic gait pattern as " result of right knee     Palpation: Increased tenderness B suboccipitals, B cervical paraspinals, B upper traps/levator scapulae with increased muscle tension also present .    Sensation: Reports pre-existing paresthesias in B hands but this has worsened since this episode began.    DTRs:  BJ +1 and symmetrical, TJ and BRJ 0 and symmetrical    Range of Motion/Strength:    Cervical A/PROM: Pain/Dysfunction with Movement:   Flexion                         38*/48*  A little discomfort    Extension                    25*/32*     Right side bending      20*/25*     Left side bending        15*/25*     Right rotation              30*/35*     Left rotation                30*/35*     B UE              ROM grossly WFL              Strength grossly 4/5-4+/5  Flexibility: Significant limitation in B Upper traps, levator scap, pectoralis mm      PT Evaluation Completed: Yes  Discussed Plan of Care with patient: Yes    Intake Outcome Measure for FOTO back  Survey    Therapist reviewed FOTO scores for Alessia Nelson on 11/7/2024.   FOTO report - see Media section or FOTO account episode details.    Intake Score: 50 %         Treatment     Total Treatment time (time-based codes) separate from Evaluation: 20 minutes     Alessia received the treatments listed below:      Manual therapy techniques: Joint mobilizations and Soft tissue Mobilization were applied to the: cervical and left scapula  for 20 minutes, including:  Supine : sub- occipital inhibition with light traction; MET for upper trap/levator tissue stretching on both the right and left;  S/L on right side - mobilization of left scapula in to all planes, light rotation of thoracic spine;     Patient Education and Home Exercises     Education provided:   - Role of PT - recommended aquatic therapy for her short term therapy until her knee surgery to maintain/improve flexibility and strength     Written Home Exercises Provided: Alessia is to continue with her usual routine for  now.   Alessia demonstrated  understanding of the education provided.     Assessment     Alessia is a 69 y.o. female referred to outpatient Physical Therapy with a medical diagnosis of low back pain (M54.50) . Patient presents with chronic neck and thoracic/lumbar pain, is set to have her right knee replaced ( re-do) on November 19th. Patient will benefit from Skilled physical therapy intervention for a short course of aquatic therapy to provide environment ( aquatic) for her to improve/maintain her mobility without stress to the joints as well as decrease tissue tension throughout her neck and upper back.      Patient prognosis is Fair.   Patient will benefit from skilled outpatient Physical Therapy to address the deficits stated above and in the chart below, provide patient /family education, and to maximize patientt's level of independence.     Plan of care discussed with patient: Yes  Patient's spiritual, cultural and educational needs considered and patient is agreeable to the plan of care and goals as stated below:     Anticipated Barriers for therapy: upcoming knee surgery     Medical Necessity is demonstrated by the following  History  Co-morbidities and personal factors that may impact the plan of care [] LOW: no personal factors / co-morbidities  [x] MODERATE: 1-2 personal factors / co-morbidities  [] HIGH: 3+ personal factors / co-morbidities    Moderate / High Support Documentation:   Co-morbidities affecting plan of care: chronic pain; knee inflammation - TKA; CAD, COPD,     Personal Factors:   no deficits     Examination  Body Structures and Functions, activity limitations and participation restrictions that may impact the plan of care [x] LOW: addressing 1-2 elements  [] MODERATE: 3+ elements  [] HIGH: 4+ elements (please support below)    Moderate / High Support Documentation: n/a      Clinical Presentation [x] LOW: stable  [] MODERATE: Evolving  [] HIGH: Unstable     Decision Making/ Complexity Score:  low       Goals:  Short Term Goals: 3 weeks   Demonstrate improvement in recent symptoms to prepare for knee surgery   Improve head/neck/spine posture to reduce pain; promote improved postural awareness for injury prevention.   Demonstrate compliance with exercise program.     Long Term Goals   N/A - patient will only be coming for therapy for 1-2 weeks before knee surgery     Plan     Plan of care Certification: 11/7/2024 to 12/31/2024.    Outpatient Physical Therapy 2 times weekly for 2 weeks to include the following interventions: Aquatic Therapy, Neuromuscular Re-ed, Patient Education, Therapeutic Activities, and Therapeutic Exercise.     Trinh Reyes, PT        Physician's Signature: _________________________________________ Date: ________________

## 2024-11-09 NOTE — PROGRESS NOTES
[facility]   Physical Therapy Treatment Note      Name: Alessia Nelson  Clinic Number: [id number]    Therapy Diagnosis:   Encounter Diagnoses   Name Primary?    Impaired functional mobility and activity tolerance Yes    Chronic bilateral low back pain without sciatica      Physician: Freddy Shay MD    Visit Date: 11/8/2024    Physician Orders: PT Eval and Treat   Medical Diagnosis from Referral:   M54.50 - lumbar spine pain   Evaluation Date: 11/7/2024  Authorization Period Expiration: 12/31/2024  Plan of Care Expiration: 12/31/2024  Progress Note Due: 12/1/2024  Date of Surgery: n/a   Visit # / Visits authorized: 1/ 6   FOTO: 1/ 3     Precautions: Standard      Time In: 10:30    Time Out: 11:15  am   Total Billable Time: 40 minutes    PTA Visit #: 0/5       Subjective     Patient reports: what you did the other day was very helpful, I felt like I could move my neck a little bit better; but it reminded me of how out of shape I have gotten. .  She was compliant with home exercise program.  Response to previous treatment: good   Functional change: none     Pain: 3/10  Location:  cervical/shoulders, lower back      Objective      Objective Measures updated at progress report unless specified.     Treatment     Alessia received the treatments listed below:      therapeutic exercises to develop endurance, ROM, flexibility, and posture for 40 minutes including:  Aquatic Therapeutic exercises for offloading of joints provided 1:1 x 40 mins:     Forward, backward and side stepping x 5 mins each  Hip extension, flexion and abduction x 15 each   Hip circles x 1 min each direction   Arms on bars - lean backwards to stretch spine/Ue's   Squat down in water to cover shoulders:  Shoulder forward/backward rolls x 1 mins each   Shoulder shrugs/push downs with weights x 15   Shoulder/UE: horizontal abd/add with weights x 20   Shoulder extension with weights x 20   Bicep curl/tricep extension x 20    Shoulder ER/IR  with weights x 20       manual therapy techniques:  were applied to the:  for  minutes, including:      neuromuscular re-education activities to improve:  for  minutes. The following activities were included:      therapeutic activities to improve functional performance for   minutes, including:    Patient Education and Home Exercises       Education provided:   - Aquatic exercise progression - use os weights in water     Written Home Exercises Provided: No  Alessia demonstrated good  understanding of the education provided.     Assessment     Alessia is a 69 y.o. female referred to outpatient Physical Therapy with a medical diagnosis of low back pain (M54.50) . Patient presents with chronic neck and thoracic/lumbar pain, is set to have her right knee replaced ( re-do) on November 19th. Patient will benefit from Skilled physical therapy intervention for a short course of aquatic therapy to provide environment ( aquatic) for her to improve/maintain her mobility without stress to the joints as well as decrease tissue tension throughout her neck and upper back.      Alessia Is progressing well towards her goals.   Patient prognosis is Good.     Patient will continue to benefit from skilled outpatient physical therapy to address the deficits listed in the problem list box on initial evaluation, provide pt/family education and to maximize pt's level of independence in the home and community environment.     Patient's spiritual, cultural and educational needs considered and pt agreeable to plan of care and goals.     Anticipated barriers to physical therapy: none     Goals:   Short Term Goals: 3 weeks   Demonstrate improvement in recent symptoms to prepare for knee surgery   Improve head/neck/spine posture to reduce pain; promote improved postural awareness for injury prevention.   Demonstrate compliance with exercise program.      Long Term Goals   N/A - patient will only be coming for therapy for 1-2 weeks  before knee surgery     Plan     Plan of care Certification: 11/7/2024 to 12/31/2024.     Outpatient Physical Therapy 2 times weekly for 2 weeks to include the following interventions: Aquatic Therapy, Neuromuscular Re-ed, Patient Education, Therapeutic Activities, and Therapeutic Exercise.     Trinh Reyes, PT

## 2024-11-11 ENCOUNTER — HOSPITAL ENCOUNTER (OUTPATIENT)
Dept: RADIOLOGY | Facility: HOSPITAL | Age: 69
Discharge: HOME | End: 2024-11-11
Attending: INTERNAL MEDICINE
Payer: MEDICARE

## 2024-11-11 DIAGNOSIS — R91.1 LUNG NODULE: Primary | ICD-10-CM

## 2024-11-11 DIAGNOSIS — R91.1 SOLITARY PULMONARY NODULE: ICD-10-CM

## 2024-11-11 PROCEDURE — 71250 CT THORAX DX C-: CPT | Mod: TC

## 2024-11-11 PROCEDURE — 71250 CT THORAX DX C-: CPT | Mod: 26,,, | Performed by: RADIOLOGY

## 2024-11-11 NOTE — PROGRESS NOTES
Notify CT scan does show some growth.  Will try to arrange a needle biopsy at [facility] as we discussed.    A staff note has been sent to Dr. Cooper-This patient has been advised by her physicians at [facility] to undergo navigational bronchoscopy.  Her recent CT scan does show continued growth.  The PET scan in May did not show significant activity.  I am placing a referral for navigational bronchoscopy.  Thank you

## 2024-11-12 ENCOUNTER — CLINICAL SUPPORT (OUTPATIENT)
Dept: REHABILITATION | Facility: HOSPITAL | Age: 69
End: 2024-11-12
Payer: MEDICARE

## 2024-11-12 DIAGNOSIS — Z74.09 IMPAIRED FUNCTIONAL MOBILITY AND ACTIVITY TOLERANCE: ICD-10-CM

## 2024-11-12 DIAGNOSIS — M54.50 CHRONIC BILATERAL LOW BACK PAIN WITHOUT SCIATICA: Primary | ICD-10-CM

## 2024-11-12 DIAGNOSIS — G89.29 CHRONIC BILATERAL LOW BACK PAIN WITHOUT SCIATICA: Primary | ICD-10-CM

## 2024-11-12 PROCEDURE — 97113 AQUATIC THERAPY/EXERCISES: CPT | Mod: PN

## 2024-11-12 NOTE — PROGRESS NOTES
[facility]   Physical Therapy Treatment Note      Name: Alessia Nelson  Clinic Number: [id number]    Therapy Diagnosis:   Encounter Diagnoses   Name Primary?    Chronic bilateral low back pain without sciatica Yes    Impaired functional mobility and activity tolerance      Physician: Freddy Shay MD    Visit Date: 11/12/2024    Physician Orders: PT Eval and Treat   Medical Diagnosis from Referral:   M54.50 - lumbar spine pain   Evaluation Date: 11/7/2024  Authorization Period Expiration: 12/31/2024  Plan of Care Expiration: 12/31/2024  Progress Note Due: 12/1/2024  Date of Surgery: n/a   Visit # / Visits authorized: 2/ 6   FOTO: 1/ 3     Precautions: Standard      Time In: 10:30    Time Out: 11:30  am   Total Billable Time: 30  minutes - split billing Medicare     PTA Visit #: 0/5       Subjective     Patient reports:  feels good to be in the pool again - exercising; Alessia stated that the pulmonologist called - recent CT scan showed nodule in lung has gotten a little larger - will be scheduling a biopsy sometime in January after her knee surgery.   She was compliant with home exercise program.  Response to previous treatment: good   Functional change: none     Pain: 3/10  Location:  cervical/shoulders, lower back      Objective      Objective Measures updated at progress report unless specified.     Treatment     Alessia received the treatments listed below:      therapeutic exercises to develop endurance, ROM, flexibility, and posture for 40 minutes including:  Aquatic Therapeutic exercises for offloading of joints provided 1:1 x 40 mins:     Forward, backward and side stepping x 5 mins each  Hip extension, flexion and abduction x 15 each   Hip circles x 1 min each direction   Arms on bars - lean backwards to stretch spine/Ue's   Squat down in water to cover shoulders:  Shoulder forward/backward rolls x 1 mins each   Shoulder shrugs/push downs with weights x 15   Shoulder/UE:  horizontal abd/add with weights x 20   Shoulder extension with weights x 20   Bicep curl/tricep extension x 20   Shoulder ER/IR  with weights x 20       manual therapy techniques:  were applied to the:  for  minutes, including:      neuromuscular re-education activities to improve:  for  minutes. The following activities were included:      therapeutic activities to improve functional performance for   minutes, including:    Patient Education and Home Exercises       Education provided:   - Aquatic exercise progression - use os weights in water     Written Home Exercises Provided: No  Alessia demonstrated good  understanding of the education provided.     Assessment     Alessia is a 69 y.o. female referred to outpatient Physical Therapy with a medical diagnosis of low back pain (M54.50) . Patient presents with chronic neck and thoracic/lumbar pain, is set to have her right knee replaced ( re-do) on November 19th. Patient will benefit from Skilled physical therapy intervention for a short course of aquatic therapy to provide environment ( aquatic) for her to improve/maintain her mobility without stress to the joints as well as decrease tissue tension throughout her neck and upper back.      Alessia Is progressing well towards her goals.   Patient prognosis is Good.     Patient will continue to benefit from skilled outpatient physical therapy to address the deficits listed in the problem list box on initial evaluation, provide pt/family education and to maximize pt's level of independence in the home and community environment.     Patient's spiritual, cultural and educational needs considered and pt agreeable to plan of care and goals.     Anticipated barriers to physical therapy: none     Goals:   Short Term Goals: 3 weeks   Demonstrate improvement in recent symptoms to prepare for knee surgery   Improve head/neck/spine posture to reduce pain; promote improved postural awareness for injury prevention.   Demonstrate  compliance with exercise program.      Long Term Goals   N/A - patient will only be coming for therapy for 1-2 weeks before knee surgery     Plan     Plan of care Certification: 11/7/2024 to 12/31/2024.     Outpatient Physical Therapy 2 times weekly for 2 weeks to include the following interventions: Aquatic Therapy, Neuromuscular Re-ed, Patient Education, Therapeutic Activities, and Therapeutic Exercise.     Trinh Reyes, PT

## 2024-12-05 PROCEDURE — G0180 MD CERTIFICATION HHA PATIENT: HCPCS | Mod: ,,, | Performed by: STUDENT IN AN ORGANIZED HEALTH CARE EDUCATION/TRAINING PROGRAM

## 2024-12-31 DIAGNOSIS — R19.7 DIARRHEA, UNSPECIFIED TYPE: ICD-10-CM

## 2024-12-31 NOTE — TELEPHONE ENCOUNTER
No care due was identified.  [facility] Embedded Care Due Messages. Reference number: [id number].   12/31/2024 3:36:05 PM CST

## 2025-01-02 RX ORDER — COLESTIPOL HYDROCHLORIDE 1 G/1
2 TABLET ORAL
Qty: 180 TABLET | Refills: 1 | Status: SHIPPED | OUTPATIENT
Start: 2025-01-02

## 2025-01-02 NOTE — TELEPHONE ENCOUNTER
Refill Decision Note   Alessia Omar  is requesting a refill authorization.  Brief Assessment and Rationale for Refill:  Approve     Medication Therapy Plan:         Comments:     Note composed:5:06 AM 01/02/2025

## 2025-01-06 DIAGNOSIS — M79.18 MYOFASCIAL PAIN: ICD-10-CM

## 2025-01-06 DIAGNOSIS — G24.3 CERVICAL DYSTONIA: ICD-10-CM

## 2025-01-06 DIAGNOSIS — M51.369 DDD (DEGENERATIVE DISC DISEASE), LUMBAR: ICD-10-CM

## 2025-01-06 RX ORDER — METHOCARBAMOL 750 MG/1
750 TABLET, FILM COATED ORAL EVERY 8 HOURS PRN
Qty: 270 TABLET | Refills: 3 | Status: SHIPPED | OUTPATIENT
Start: 2025-01-06

## 2025-01-06 NOTE — TELEPHONE ENCOUNTER
Spoke with pt.   Pt requesting MD to fill Robaxin 750 mg d/t Laura ROWAN No longer with [facility]. Please advise

## 2025-01-06 NOTE — TELEPHONE ENCOUNTER
No care due was identified.  [facility] Embedded Care Due Messages. Reference number: [id number].   1/06/2025 2:13:09 PM CST

## 2025-01-06 NOTE — TELEPHONE ENCOUNTER
----- Message from [place] sent at 1/6/2025  1:56 PM CST -----  Type:  RX Refill Request    Who Called:  Patient   Refill or New Rx:  refill  RX Name and Strength: methocarbamoL (ROBAXIN) 750 MG Tab    How is the patient currently taking it? (ex. 1XDay):    Is this a 30 day or 90 day RX:    Preferred Pharmacy with phone number: [email or web address] [address]   Local or Mail Order:  mail order  Ordering Provider:  Dr. Enrike Silva Call Back Number:  [phone]  Additional Information:  Patient requesting refill

## 2025-01-30 DIAGNOSIS — Z00.00 ENCOUNTER FOR MEDICARE ANNUAL WELLNESS EXAM: ICD-10-CM

## 2025-01-31 ENCOUNTER — DOCUMENT SCAN (OUTPATIENT)
Dept: HOME HEALTH SERVICES | Facility: HOSPITAL | Age: 70
End: 2025-01-31
Payer: MEDICARE

## 2025-02-05 DIAGNOSIS — Z78.0 MENOPAUSE: ICD-10-CM

## 2025-02-06 DIAGNOSIS — E53.8 B12 DEFICIENCY: ICD-10-CM

## 2025-02-07 DIAGNOSIS — Z96.651 PRESENCE OF RIGHT ARTIFICIAL KNEE JOINT: Primary | ICD-10-CM

## 2025-02-07 RX ORDER — CYANOCOBALAMIN 1000 UG/ML
INJECTION, SOLUTION INTRAMUSCULAR; SUBCUTANEOUS
Qty: 10 ML | Refills: 0 | Status: SHIPPED | OUTPATIENT
Start: 2025-02-07

## 2025-02-07 NOTE — TELEPHONE ENCOUNTER
Refill Routing Note   Medication(s) are not appropriate for processing by [facility] for the following reason(s):        Outside of protocol    [facility] action(s):  Route               Appointments  past 12m or future 3m with PCP    Date Provider   Last Visit   11/6/2024 Luana Calvert MD   Next Visit   Visit date not found Luana Calvert MD   ED visits in past 90 days: 0        Note composed:8:07 AM 02/07/2025

## 2025-02-09 DIAGNOSIS — I10 PRIMARY HYPERTENSION: ICD-10-CM

## 2025-02-10 ENCOUNTER — TELEPHONE (OUTPATIENT)
Dept: CARDIOLOGY | Facility: CLINIC | Age: 70
End: 2025-02-10
Payer: MEDICARE

## 2025-02-10 DIAGNOSIS — M10.9 GOUT, UNSPECIFIED CAUSE, UNSPECIFIED CHRONICITY, UNSPECIFIED SITE: ICD-10-CM

## 2025-02-10 RX ORDER — AMLODIPINE BESYLATE 5 MG/1
5 TABLET ORAL 2 TIMES DAILY
Qty: 180 TABLET | Refills: 1 | Status: SHIPPED | OUTPATIENT
Start: 2025-02-10

## 2025-02-10 NOTE — TELEPHONE ENCOUNTER
Refill Routing Note   Medication(s) are not appropriate for processing by [facility] for the following reason(s):        New or recently adjusted medication  No active prescription written by provider  Required vitals abnormal    [facility] action(s):  Defer             Appointments  past 12m or future 3m with PCP    Date Provider   Last Visit   11/6/2024 Luana Calvert MD   Next Visit   Visit date not found Luana Calvert MD   ED visits in past 90 days: 0        Note composed:11:07 AM 02/10/2025

## 2025-02-10 NOTE — TELEPHONE ENCOUNTER
No care due was identified.  [facility] Embedded Care Due Messages. Reference number: [id number].   2/10/2025 11:06:19 AM CST

## 2025-02-10 NOTE — LETTER
February 10, 2025    Alessia Nelson  [address] [place] [address]             [facility]  [address]  [facility] [address]  Phone: [phone]  Fax: [phone] Patient: Alessia Nelson  : 1955  Referring Doctor: dr gentile  Type of procedure: robotic bronchoscopy     Current Outpatient Medications   Medication Sig    acetaminophen (TYLENOL) 325 MG tablet Take 2 tablets (650 mg total) by mouth every 6 (six) hours as needed for Pain (Do not take with any other tylenol containing products).    allopurinoL (ZYLOPRIM) 300 MG tablet TAKE 1 TABLET DAILY    aluminum-magnesium hydroxide-simethicone (MAALOX) 200-200-20 mg/5 mL Susp Take 30 mLs by mouth 4 (four) times daily as needed. (Patient not taking: Reported on 10/3/2024)    amLODIPine (NORVASC) 5 MG tablet TAKE 1 TABLET(5 MG) BY MOUTH TWICE DAILY    busPIRone (BUSPAR) 5 MG Tab Take 1 tablet (5 mg total) by mouth 2 (two) times daily.    calcium citrate-vitamin D3 315-200 mg (CITRACAL+D) 315 mg-5 mcg (200 unit) per tablet Take 1 tablet by mouth once daily.    clopidogreL (PLAVIX) 75 mg tablet Take 1 tablet (75 mg total) by mouth once daily.    colestipoL (COLESTID) 1 gram Tab TAKE 2 TABLETS ONCE DAILY    cyanocobalamin 1,000 mcg/mL injection INJECT 1 ML IN THE MUSCLE EVERY 14 DAYS    EScitalopram oxalate (LEXAPRO) 20 MG tablet TAKE 1 TABLET EVERY EVENING    fluticasone propionate (FLONASE) 50 mcg/actuation nasal spray 2 sprays (100 mcg total) by Each Nostril route 2 (two) times daily.    furosemide (LASIX) 20 MG tablet TAKE 1 TABLET(20 MG) BY MOUTH EVERY DAY    gabapentin (NEURONTIN) 400 MG capsule Take 1 capsule (400 mg total) by mouth 2 (two) times daily.    hydrALAZINE (APRESOLINE) 25 MG tablet Take 1 tablet (25 mg total) by mouth daily as needed (Check BP after taking amlodipine, Lasix, metoprolol.  If it continues to stay greater than 160/100 take hydralazine).     levothyroxine (SYNTHROID) 75 MCG tablet TAKE 1 TABLET DAILY    magnesium oxide (MAG-OX) 400 mg (241.3 mg magnesium) tablet Take 1 tablet (400 mg total) by mouth once daily.    methocarbamoL (ROBAXIN) 750 MG Tab Take 1 tablet (750 mg total) by mouth every 8 (eight) hours as needed (muscle spasm).    methylPREDNISolone (MEDROL DOSEPACK) 4 mg tablet     metoprolol succinate (TOPROL-XL) 50 MG 24 hr tablet Take 1 tablet (50 mg total) by mouth once daily. Pt takes 1/2 tab daily    pantoprazole (PROTONIX) 40 MG tablet TAKE 1 TABLET DAILY    rosuvastatin (CRESTOR) 10 MG tablet TAKE 1 TABLET DAILY    traMADoL (ULTRAM) 50 mg tablet Take 1 tablet (50 mg total) by mouth every 8 (eight) hours as needed for Pain.    traZODone (DESYREL) 100 MG tablet TAKE 1 TABLET EVERY EVENING    vit A/vit C/vit E/zinc/copper (PRESERVISION AREDS ORAL) Take 1 capsule by mouth 2 (two) times a day.    vitamin D (VITAMIN D3) 1000 units Tab Take 1,000 Units by mouth 2 (two) times a day.    ZENPEP 40,000-126,000- 168,000 unit CpDR Take 1 capsule by mouth once daily.     No current facility-administered medications for this visit.     Facility-Administered Medications Ordered in Other Visits   Medication    oxyCODONE immediate release tablet 5 mg         _x__ Recommendations for antiplatelet/anticoagulant medications: ok to stop plavix for 5-7 days prior  __x_ Cleared for surgery with moderate risk      If you have any questions regarding the above, please contact my office at [phone].    Sincerely,      Marisa Lundy NP

## 2025-02-10 NOTE — TELEPHONE ENCOUNTER
----- Message from Dalila sent at 2/7/2025  1:25 PM CST -----  Regarding: Cardiac/Medical Clearance  Dr. Ismael Cooper has scheduled the patient for a Robotic EBUS Bronchoscopy   on 2/20/2025. We are requesting cardiac clearance and requesting the patient to hold the following Clopidogrel (Plavix) hold 5 days. Please fax over a clearance to our office at [phone] or place clearance in Epic and send back to me. Thanks for all your assistance with this patient and their care.

## 2025-02-11 RX ORDER — ALLOPURINOL 300 MG/1
300 TABLET ORAL
Qty: 90 TABLET | Refills: 1 | Status: SHIPPED | OUTPATIENT
Start: 2025-02-11

## 2025-02-11 NOTE — TELEPHONE ENCOUNTER
Refill Routing Note   Medication(s) are not appropriate for processing by [facility] for the following reason(s):        Drug-disease interaction  Drug-Disease: allopurinoL and Stage 3b chronic kidney disease    [facility] action(s):  Defer               Appointments  past 12m or future 3m with PCP    Date Provider   Last Visit   11/6/2024 Luana Calvert MD   Next Visit   Visit date not found Luana Calvert MD   ED visits in past 90 days: 0        Note composed:2:57 AM 02/11/2025

## 2025-02-11 NOTE — TELEPHONE ENCOUNTER
No care due was identified.  [facility] Embedded Care Due Messages. Reference number: [id number].   2/10/2025 11:43:27 PM CST

## 2025-02-13 ENCOUNTER — TELEPHONE (OUTPATIENT)
Dept: FAMILY MEDICINE | Facility: CLINIC | Age: 70
End: 2025-02-13
Payer: MEDICARE

## 2025-02-13 NOTE — TELEPHONE ENCOUNTER
----- Message from Ike sent at 2/13/2025 10:50 AM CST -----  Contact: Self  Type: Needs Medical Advice    Who Called:  Patient    Pharmacy name and phone #:    [facility] - [address] [address]  Phone: [phone] Fax: [phone]    [facility] Call Back Number: [phone]    Additional Information: Patient is requesting a call back regarding her prescription for Potassium and why it was canceled as she is needing a refill

## 2025-02-14 NOTE — TELEPHONE ENCOUNTER
No care due was identified.  [facility] Embedded Care Due Messages. Reference number: [id number].   2/14/2025 2:02:09 PM CST

## 2025-02-14 NOTE — TELEPHONE ENCOUNTER
----- Message from Melia sent at 2/14/2025  1:53 PM CST -----  Contact: self  Type:  Patient Returning Call    Who Called:  pt  Who Left Message for Patient:  miguel  Does the patient know what this is regarding?:  yes  [facility] Call Back Number:  [phone]   Additional Information:  please call

## 2025-02-15 RX ORDER — POTASSIUM CHLORIDE 750 MG/1
10 TABLET, EXTENDED RELEASE ORAL DAILY
Qty: 90 TABLET | Refills: 3 | Status: SHIPPED | OUTPATIENT
Start: 2025-02-15

## 2025-02-18 ENCOUNTER — LAB VISIT (OUTPATIENT)
Dept: LAB | Facility: HOSPITAL | Age: 70
End: 2025-02-18
Attending: INTERNAL MEDICINE
Payer: MEDICARE

## 2025-02-18 DIAGNOSIS — R91.1 LUNG NODULE: ICD-10-CM

## 2025-02-18 LAB
ANION GAP SERPL CALC-SCNC: 10 MMOL/L (ref 8–16)
APTT PPP: 25.4 SEC (ref 21–32)
BASOPHILS # BLD AUTO: 0.05 K/UL (ref 0–0.2)
BASOPHILS NFR BLD: 0.6 % (ref 0–1.9)
BUN SERPL-MCNC: 12 MG/DL (ref 8–23)
CALCIUM SERPL-MCNC: 10.7 MG/DL (ref 8.7–10.5)
CHLORIDE SERPL-SCNC: 105 MMOL/L (ref 95–110)
CO2 SERPL-SCNC: 25 MMOL/L (ref 23–29)
CREAT SERPL-MCNC: 0.9 MG/DL (ref 0.5–1.4)
DIFFERENTIAL METHOD BLD: ABNORMAL
EOSINOPHIL # BLD AUTO: 0.2 K/UL (ref 0–0.5)
EOSINOPHIL NFR BLD: 2.9 % (ref 0–8)
ERYTHROCYTE [DISTWIDTH] IN BLOOD BY AUTOMATED COUNT: 15.4 % (ref 11.5–14.5)
EST. GFR  (NO RACE VARIABLE): >60 ML/MIN/1.73 M^2
GLUCOSE SERPL-MCNC: 109 MG/DL (ref 70–110)
HCT VFR BLD AUTO: 37.7 % (ref 37–48.5)
HGB BLD-MCNC: 11.4 G/DL (ref 12–16)
IMM GRANULOCYTES # BLD AUTO: 0.02 K/UL (ref 0–0.04)
IMM GRANULOCYTES NFR BLD AUTO: 0.3 % (ref 0–0.5)
INR PPP: 1 (ref 0.8–1.2)
LYMPHOCYTES # BLD AUTO: 1.8 K/UL (ref 1–4.8)
LYMPHOCYTES NFR BLD: 22.3 % (ref 18–48)
MCH RBC QN AUTO: 27.5 PG (ref 27–31)
MCHC RBC AUTO-ENTMCNC: 30.2 G/DL (ref 32–36)
MCV RBC AUTO: 91 FL (ref 82–98)
MONOCYTES # BLD AUTO: 0.5 K/UL (ref 0.3–1)
MONOCYTES NFR BLD: 6.3 % (ref 4–15)
NEUTROPHILS # BLD AUTO: 5.4 K/UL (ref 1.8–7.7)
NEUTROPHILS NFR BLD: 67.6 % (ref 38–73)
NRBC BLD-RTO: 0 /100 WBC
PLATELET # BLD AUTO: 253 K/UL (ref 150–450)
PMV BLD AUTO: 9 FL (ref 9.2–12.9)
POTASSIUM SERPL-SCNC: 4.5 MMOL/L (ref 3.5–5.1)
PROTHROMBIN TIME: 11.5 SEC (ref 9–12.5)
RBC # BLD AUTO: 4.14 M/UL (ref 4–5.4)
SODIUM SERPL-SCNC: 140 MMOL/L (ref 136–145)
WBC # BLD AUTO: 8 K/UL (ref 3.9–12.7)

## 2025-02-18 PROCEDURE — 36415 COLL VENOUS BLD VENIPUNCTURE: CPT | Performed by: INTERNAL MEDICINE

## 2025-02-18 PROCEDURE — 85025 COMPLETE CBC W/AUTO DIFF WBC: CPT | Performed by: INTERNAL MEDICINE

## 2025-02-18 PROCEDURE — 85730 THROMBOPLASTIN TIME PARTIAL: CPT | Mod: GA | Performed by: INTERNAL MEDICINE

## 2025-02-18 PROCEDURE — 85610 PROTHROMBIN TIME: CPT | Mod: GA | Performed by: INTERNAL MEDICINE

## 2025-02-18 PROCEDURE — 80048 BASIC METABOLIC PNL TOTAL CA: CPT | Performed by: INTERNAL MEDICINE

## 2025-02-21 ENCOUNTER — TELEPHONE (OUTPATIENT)
Dept: FAMILY MEDICINE | Facility: CLINIC | Age: 70
End: 2025-02-21
Payer: MEDICARE

## 2025-02-21 NOTE — TELEPHONE ENCOUNTER
Spoke w/ pt   Informed pt potassium chloride sent into [facility] 2/15/25.   Pt voiced understanding

## 2025-02-21 NOTE — TELEPHONE ENCOUNTER
----- Message from Dr. TATTOFF sent at 2/21/2025  9:47 AM CST -----  Type:  RX Refill RequestWho Called:the patientRefill or New Rx:refilRX Name and Strength:potassium chloride SA (K-DUR,KLOR-CON M) 10 MEQ tablet How is the patient currently taking it? (ex. 1XDay):as rx'dIs this a 30 day or 90 day RX:90Preferred Pharmacy with phone number:[facility] - [address] [address]Gvfpy: [phone] Fax: [phone] or Mail Order:mailOrdering Provider:sameWould the patient rather a call back or a response via [facility]? Call/Best Call Back Number:[phone] Additional Information: Thanks

## 2025-02-24 ENCOUNTER — CLINICAL SUPPORT (OUTPATIENT)
Dept: REHABILITATION | Facility: HOSPITAL | Age: 70
End: 2025-02-24
Attending: ORTHOPAEDIC SURGERY
Payer: MEDICARE

## 2025-02-24 DIAGNOSIS — Z74.09 IMPAIRED FUNCTIONAL MOBILITY AND ACTIVITY TOLERANCE: Primary | ICD-10-CM

## 2025-02-24 DIAGNOSIS — Z96.651 PRESENCE OF RIGHT ARTIFICIAL KNEE JOINT: ICD-10-CM

## 2025-02-24 PROCEDURE — 97161 PT EVAL LOW COMPLEX 20 MIN: CPT | Mod: PN

## 2025-02-24 PROCEDURE — 97530 THERAPEUTIC ACTIVITIES: CPT | Mod: PN

## 2025-02-24 NOTE — LETTER
February 25, 2025  Michele Singh MD  [address] [address]  [address]  [facility]  [facility] [address]    To whom it may concern,     The attached plan of care is being sent to you for review and reference.    You may indicate your approval by signing the document electronically, or by faxing/mailing a signed copy of the final page of this document back to the attention of Trinh Reyes, PT:         Plan of Care 2/24/25   Effective from: 2/24/2025  Effective to: 5/25/2025    Plan ID: [id number]            Participants as of Finalize on 2/25/2025    Name Type Comments Contact Info    Michele Singh MD Referring Provider  [phone]    Trinh Reyes PT Physical Therapist         Last Plan Note     Author: Trinh Reyes PT Status: Signed Last edited: 2/24/2025  1:30 PM         Outpatient Rehab    Physical Therapy Evaluation    Patient Name: Alessia Nelson  MRN: [id number]  YOB: 1955  Encounter Date: 2/24/2025    Therapy Diagnosis:   Encounter Diagnoses   Name Primary?    Presence of right artificial knee joint     Impaired functional mobility and activity tolerance Yes     Physician: Michele Singh MD    Physician Orders: Eval and Treat  Medical Diagnosis:  Z96.651 (ICD-10-CM) - Presence of right artificial knee joint    Visit # / Visits Authorized:  1 / 1   Date of Evaluation:  2/24/2025   Insurance Authorization Period: 2/1/2025 to 12/31/2025  Plan of Care Certification:  2/24/2025 to 5/24/2025      Time In: 1330   Time Out: 1430  Total Time: 60   Total Billable Time: 60    Intake Outcome Measure for FOTO Survey    Therapist reviewed FOTO scores for Alessia Nelson on 2/24/2025.   FOTO report - see Media section or FOTO account episode details.     Intake Score: 45%         Subjective   History of Present Illness  Alessia is a 69 y.o. female who reports to physical therapy with a chief concern of My knee is still stiff, but once I get going, it works itself out;  I haven't had PT in about a month now  - between snow, other tests, I haven't been able to get here..      The patient has experienced this issue since 11/19/24.           History of Present Condition/Illness: Alessia is well known from previous treatments over the years for chronic neck, back, RLE pain; Had Revision of TKA last November 19th  by Dr. Singh that has gone extremely well.  Alessia had IPR for 2 weeks following surgery; then HHPT for about 6 weeks.  Alessia is progressing very well with knee; has good ROM, but her quad remains weak due to long standing pain with her previous replacement.  Alessia is also dealing with her chronic neck and lower back pain.  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 Shay at [facility] in March.  She also underwent a bronchoscopy for a lung nodule on 2/20/25.   Pathology report is pending on this.     Pain     Patient reports a current pain level of 4/10. Pain at best is reported as 3/10. Pain at worst is reported as 8/10.   Location: RIght knee - patient reports her back is bothering her ore now too.         Living Arrangements  Living Situation  Housing: Home independently  Living Arrangements: Alone  Support Systems: Friends/neighbors    Home Setup  Type of Structure: House  Number of Levels in Home: One level        Employment  Patient does not report that: Does the patient's condition impact their ability to work?  Employment Status: Retired          Past Medical History/Physical Systems Review:   Alessia Nelson  has a past medical history of Acute pancreatitis, Back pain, CAD (coronary artery disease), CHF (congestive heart failure), COPD (chronic obstructive pulmonary disease), Depression, Diverticulosis, Encounter for blood transfusion, GERD (gastroesophageal reflux disease), History of blood clots, History of bowel resection, Hyperlipidemia, Hypertension, Peritonitis, PONV (postoperative nausea and  vomiting), Pulmonary nodule, Seizures, Stroke, Thyroid disease, TIA (transient ischemic attack), and Wears dentures.    Alessia Nelson  has a past surgical history that includes Parathyroid gland surgery; instestine; Hernia repair; Hysterectomy; Cholecystectomy; Adrenal gland surgery; Colonoscopy; Upper gastrointestinal endoscopy; Incisional hernia repair; Injection of anesthetic agent around nerve (Right, 05/27/2019); Radiofrequency ablation (Right, 06/10/2019); Coronary stent placement; Appendectomy; Back surgery; Cystourethroscopy (N/A, 11/13/2019); Endoscopic ultrasound of upper gastrointestinal tract (N/A, 11/25/2019); Knee surgery (1983); Knee Arthroplasty (Right, 12/18/2019); Endoscopic ultrasound of upper gastrointestinal tract (N/A, 11/02/2020); Oophorectomy; Caudal epidural steroid injection (N/A, 03/01/2021); Insertion of dorsal column nerve stimulator for trial (N/A, 06/07/2021); Esophagogastroduodenoscopy (N/A, 06/10/2021); Colonoscopy (N/A, 06/10/2021); Injection of joint (Right, 09/22/2021); Epidural steroid injection into cervical spine (N/A, 12/08/2021); Endoscopic ultrasound of lower gastrointestinal tract (N/A, 03/08/2022); Colonoscopy (N/A, 03/08/2022); Fracture surgery (Left, 05/02/2022); Epidural steroid injection into cervical spine (N/A, 11/30/2023); Direct diagnostic laryngoscopy with bronchoscopy and esophagoscopy (N/A, 01/03/2024); esophagoscopy, using bougie, with dilation (N/A, 01/03/2024); Spinal cord stimulator removal (N/A, 02/21/2024); Esophagogastroduodenoscopy (N/A, 09/26/2024); Revision of knee arthroplasty (Right); robotic bronchoscopy (Bilateral, 2/20/2025); and Endobronchial ultrasound (Bilateral, 2/20/2025).    Alessia has a current medication list which includes the following prescription(s): acetaminophen, allopurinol, amlodipine, buspirone, calcium citrate-vitamin d3 315-200 mg, clopidogrel, colestipol, cyanocobalamin, escitalopram oxalate, fluticasone propionate,  "furosemide, gabapentin, hydralazine, levothyroxine, magnesium oxide, methocarbamol, metoprolol succinate, oxycodone, pantoprazole, potassium chloride sa, rosuvastatin, trazodone, vit a/vit c/vit e/zinc/copper, vitamin d, and zenpep, and the following Facility-Administered Medications: oxycodone.    Review of patient's allergies indicates:   Allergen Reactions    Aspirin Other (See Comments)     "Makes stomach feel like it's on fire"    Phenergan [promethazine] Other (See Comments)     SEIZURES    Nickel     Lortab [hydrocodone-acetaminophen] Itching     Able to take generic Norco        Objective   Posture                 Forward head posture; kyphosis; flattened lumbar lordosis; Right knee with slight flexion in standing;      Knee Range of Motion   Right Knee   Active (deg) Passive (deg) Pain   Flexion 118 120     Extension 0 0                        Hip Strength - Planes of Motion   Right Strength Right Pain Left Strength Left  Pain   Flexion (L2) 4+   4+     Extension 3+   3+     ABduction 3+   3+     ADduction 4-   4-     Internal Rotation 3   3+     External Rotation 3+   3+         Hip Strength - Isolated Muscles   Right Strength Right Pain Left Strength Left  Pain   Gluteus Jose 3+   3+     Gluteus Medius 3+   3+     Gluteus Minimus 3+   3+     Tensor Fasciae Latae 4-           Knee Strength   Right Strength Right Pain Left Strength Left  Pain   Flexion (S2) 3+   4-     Prone Flexion 3+   3+     Extension (L3) 4   4+                   Fall Risk  Functional mobility test results suggest the patient is not: At Risk for Falls  Four Stage Balance Test  Narrow Base of Support: 45 sec sec  Tandem Stand - Right Foot in Front: 30 sec  Tandem Stand - Left Foot in Front: 30 sec  Semi-Tandem Stand - Right Foot in Front: 30 sec  Semi-Tandem Stand - Left Foot in Front: 30 sec  Single Leg Stand - Right Foot: 10 sec  Single Leg Stand - Left Foot: 15 sec       Sit to Stand Testing      The patient completed 7 " "repetitions of a sit to stand transfer in 30 seconds. from 21.5" height; arms crossed at chest      Unremarkable Mobility Test Results  Unremarkable: Right Forward Step Up 6 Inches and Left Forward Step Up 6 Inches    Six Inch Forward Step Up - Right          Upper Extremity Support: bilateral UE assist required - light for balance    Six Inch Forward Step Up - Left          Upper Extremity Support: bilateral UE assist required - light for balance    Ambulation Assistance Required  Surface With  Assistive Device Without Assistive Device Details   Level   Independent      Uneven   Independent     Curb   Independent       Stairs Assistance Required   Assistance Level Upper Extremity Support Pattern   Ascending Independent Two rails Reciprocal   Descending Independent Two rails Non-reciprocal        Ambulation Details  Ambulation distance was 200 meters. Able to extend right knee on heel strike with fair+ activation of right quad, good foot clearance; no glaring gait deficits noted          Treatment:  Therapeutic Activity  Therapeutic Activity 1: Recumbent Bike - Level 1 x 10 mins  Therapeutic Activity 2: Quad sets - 10" hold x 2 mins  Therapeutic Activity 3: DKC with swiss ball x 2 mins  Therapeutic Activity 4: S/L hip abudction x 12  Therapeutic Activity 5: S/L clams x 15  Therapeutic Activity 6: Quantum: knee flexion - 20# 10 x 2  Therapeutic Activity 7: Quantum: SAQ's : 20# 10 x 2    Assessment & Plan   Assessment  Alessia presents with a condition of Low complexity.   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  Will be following up with neurosurgery in a couple of weeks for further evaluation.     Functional Limitations: Activity tolerance, Ambulating on uneven surfaces, Decreased ambulation distance/endurance, " Getting off the floor, Participating in leisure activities, Squatting  Impairments: Activity intolerance, Impaired balance, Impaired physical strength    Patient Goal for Therapy (PT): To improve strength, functional mobility, balance and endurance for daily/recreational activities  Prognosis: Good  Assessment Details: Alessia is 70 yo who is now 3 1/2 months post Right TKA Revision.  She did very well with surgery; spent a couple of weeks at [facility], then another month with HHPT. She is demonstrating good AROM/PROM in her right knee, good activation of distal quad, able to perform SLR without quad lag.  Alessia demonstrates general core, hip and knee strength deficits with decreased endurance and balance for higher level activities.  She will benefit from Skilled physical therapy intervention to address these deficits to maximize her return of full function.  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  She will be following up with neuro in a few weeks for further diagnostic inquiry if necessary.  She also just underwent a bronchoscopy to evaluate a lung nodule - pathology report on this is pending.   These co morbidities may affect her physical therapy down the road.     Plan  From a physical therapy perspective, the patient would benefit from: Skilled Rehab Services    Planned therapy interventions include: Therapeutic exercise, Therapeutic activities, Neuromuscular re-education, Manual therapy, and Aquatic therapy.            Visit Frequency: 2 times Per Week for 6 Weeks.       This plan was discussed with Patient and Therapy assistant.   Discussion participants: Agreed Upon Plan of Care             Patient's spiritual, cultural, and educational needs considered and patient agreeable to plan of care and goals.     Education  Education was done with Patient. The patient's learning style  includes Demonstration and Listening. The patient Demonstrates understanding and Verbalizes understanding.         POC, incorporation of aquatics into plan;        Goals:   Active       LTG's - 6 weeks        Able to perform all household activities with no limitation        Start:  02/24/25    Expected End:  04/11/25            Able to ambulate community required distances with no limitation        Start:  02/24/25    Expected End:  04/11/25            Able to ascend/descend a flight of steps with single railing with reciprocal pattern, no limitation        Start:  02/24/25    Expected End:  04/11/25            Improve RLE strength by 1/2 grade to improve gait/balance and function        Start:  02/24/25    Expected End:  04/11/25            Right knee AROM - 0 - 125 degrees without increased symptoms.        Start:  02/24/25    Expected End:  04/11/25               STG's - 3 weeks        Demonstrate improvement in recent symptoms to progress toward long term goals        Start:  02/24/25    Expected End:  03/21/25            Correct postural deficits to reduce pain; improve postural awareness for injury prevention        Start:  02/24/25    Expected End:  03/21/25            Demonstrate compliance with initial exercise program        Start:  02/24/25    Expected End:  03/21/25                Trinh Reyes PT           Current Participants as of 2/25/2025    Name Type Comments Contact Info    Michele Singh MD Referring Provider  [phone]    Signature pending    Trinh Reyes PT Physical Therapist                  Sincerely,      Trinh Reyse PT  [facility]                                                            Dear Trinh Reyes PT,    RE: Ms. Alessia Nelson, MRN: [id number]    I certify that I have reviewed the attached plan of care and agree to the details within.        ___________________________  ___________________________  Patient Printed Name    Patient Signed Name      ___________________________  Date  and Time

## 2025-02-26 ENCOUNTER — DOCUMENTATION ONLY (OUTPATIENT)
Dept: PULMONOLOGY | Facility: CLINIC | Age: 70
End: 2025-02-26
Payer: MEDICARE

## 2025-02-26 ENCOUNTER — CLINICAL SUPPORT (OUTPATIENT)
Dept: REHABILITATION | Facility: HOSPITAL | Age: 70
End: 2025-02-26
Payer: MEDICARE

## 2025-02-26 DIAGNOSIS — Z74.09 IMPAIRED FUNCTIONAL MOBILITY AND ACTIVITY TOLERANCE: Primary | ICD-10-CM

## 2025-02-26 PROCEDURE — 97112 NEUROMUSCULAR REEDUCATION: CPT | Mod: PN

## 2025-02-26 PROCEDURE — 97530 THERAPEUTIC ACTIVITIES: CPT | Mod: PN

## 2025-02-26 NOTE — PROGRESS NOTES
Outpatient Rehab    Physical Therapy Evaluation    Patient Name: Alessia Nelson  MRN: [id number]  YOB: 1955  Encounter Date: 2/24/2025    Therapy Diagnosis:   Encounter Diagnoses   Name Primary?    Presence of right artificial knee joint     Impaired functional mobility and activity tolerance Yes     Physician: Michele Singh MD    Physician Orders: Eval and Treat  Medical Diagnosis:  Z96.651 (ICD-10-CM) - Presence of right artificial knee joint    Visit # / Visits Authorized:  1 / 1   Date of Evaluation:  2/24/2025   Insurance Authorization Period: 2/1/2025 to 12/31/2025  Plan of Care Certification:  2/24/2025 to 5/24/2025      Time In: 1330   Time Out: 1430  Total Time: 60   Total Billable Time: 60    Intake Outcome Measure for FOTO Survey    Therapist reviewed FOTO scores for Alessia Nelson on 2/24/2025.   FOTO report - see Media section or FOTO account episode details.     Intake Score: 45%         Subjective   History of Present Illness  Alessia is a 69 y.o. female who reports to physical therapy with a chief concern of My knee is still stiff, but once I get going, it works itself out;  I haven't had PT in about a month now - between snow, other tests, I haven't been able to get here..      The patient has experienced this issue since 11/19/24.           History of Present Condition/Illness: Alessia is well known from previous treatments over the years for chronic neck, back, RLE pain; Had Revision of TKA last November 19th  by Dr. Singh that has gone extremely well.  Alessia had IPR for 2 weeks following surgery; then HHPT for about 6 weeks.  Alessia is progressing very well with knee; has good ROM, but her quad remains weak due to long standing pain with her previous replacement.  Alessia is also dealing with her chronic neck and lower back pain.  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 Shay [facility] in March.  She also underwent a bronchoscopy for a lung nodule on 2/20/25.   Pathology report is pending on this.     Pain     Patient reports a current pain level of 4/10. Pain at best is reported as 3/10. Pain at worst is reported as 8/10.   Location: RIght knee - patient reports her back is bothering her ore now too.         Living Arrangements  Living Situation  Housing: Home independently  Living Arrangements: Alone  Support Systems: Friends/neighbors    Home Setup  Type of Structure: House  Number of Levels in Home: One level        Employment  Patient does not report that: Does the patient's condition impact their ability to work?  Employment Status: Retired          Past Medical History/Physical Systems Review:   Alessia Nelson  has a past medical history of Acute pancreatitis, Back pain, CAD (coronary artery disease), CHF (congestive heart failure), COPD (chronic obstructive pulmonary disease), Depression, Diverticulosis, Encounter for blood transfusion, GERD (gastroesophageal reflux disease), History of blood clots, History of bowel resection, Hyperlipidemia, Hypertension, Peritonitis, PONV (postoperative nausea and vomiting), Pulmonary nodule, Seizures, Stroke, Thyroid disease, TIA (transient ischemic attack), and Wears dentures.    Alessia Nelson  has a past surgical history that includes Parathyroid gland surgery; instestine; Hernia repair; Hysterectomy; Cholecystectomy; Adrenal gland surgery; Colonoscopy; Upper gastrointestinal endoscopy; Incisional hernia repair; Injection of anesthetic agent around nerve (Right, 05/27/2019); Radiofrequency ablation (Right, 06/10/2019); Coronary stent placement; Appendectomy; Back surgery; Cystourethroscopy (N/A, 11/13/2019); Endoscopic ultrasound of upper gastrointestinal tract (N/A, 11/25/2019); Knee surgery (1983); Knee Arthroplasty (Right, 12/18/2019); Endoscopic ultrasound of upper gastrointestinal tract (N/A, 11/02/2020); Oophorectomy;  "Caudal epidural steroid injection (N/A, 03/01/2021); Insertion of dorsal column nerve stimulator for trial (N/A, 06/07/2021); Esophagogastroduodenoscopy (N/A, 06/10/2021); Colonoscopy (N/A, 06/10/2021); Injection of joint (Right, 09/22/2021); Epidural steroid injection into cervical spine (N/A, 12/08/2021); Endoscopic ultrasound of lower gastrointestinal tract (N/A, 03/08/2022); Colonoscopy (N/A, 03/08/2022); Fracture surgery (Left, 05/02/2022); Epidural steroid injection into cervical spine (N/A, 11/30/2023); Direct diagnostic laryngoscopy with bronchoscopy and esophagoscopy (N/A, 01/03/2024); esophagoscopy, using bougie, with dilation (N/A, 01/03/2024); Spinal cord stimulator removal (N/A, 02/21/2024); Esophagogastroduodenoscopy (N/A, 09/26/2024); Revision of knee arthroplasty (Right); robotic bronchoscopy (Bilateral, 2/20/2025); and Endobronchial ultrasound (Bilateral, 2/20/2025).    Alessia has a current medication list which includes the following prescription(s): acetaminophen, allopurinol, amlodipine, buspirone, calcium citrate-vitamin d3 315-200 mg, clopidogrel, colestipol, cyanocobalamin, escitalopram oxalate, fluticasone propionate, furosemide, gabapentin, hydralazine, levothyroxine, magnesium oxide, methocarbamol, metoprolol succinate, oxycodone, pantoprazole, potassium chloride sa, rosuvastatin, trazodone, vit a/vit c/vit e/zinc/copper, vitamin d, and zenpep, and the following Facility-Administered Medications: oxycodone.    Review of patient's allergies indicates:   Allergen Reactions    Aspirin Other (See Comments)     "Makes stomach feel like it's on fire"    Phenergan [promethazine] Other (See Comments)     SEIZURES    Nickel     Lortab [hydrocodone-acetaminophen] Itching     Able to take generic Norco        Objective   Posture                 Forward head posture; kyphosis; flattened lumbar lordosis; Right knee with slight flexion in standing;      Knee Range of Motion   Right Knee   Active (deg) " "Passive (deg) Pain   Flexion 118 120     Extension 0 0                        Hip Strength - Planes of Motion   Right Strength Right Pain Left Strength Left  Pain   Flexion (L2) 4+   4+     Extension 3+   3+     ABduction 3+   3+     ADduction 4-   4-     Internal Rotation 3   3+     External Rotation 3+   3+         Hip Strength - Isolated Muscles   Right Strength Right Pain Left Strength Left  Pain   Gluteus Jose 3+   3+     Gluteus Medius 3+   3+     Gluteus Minimus 3+   3+     Tensor Fasciae Latae 4-           Knee Strength   Right Strength Right Pain Left Strength Left  Pain   Flexion (S2) 3+   4-     Prone Flexion 3+   3+     Extension (L3) 4   4+                   Fall Risk  Functional mobility test results suggest the patient is not: At Risk for Falls  Four Stage Balance Test  Narrow Base of Support: 45 sec sec  Tandem Stand - Right Foot in Front: 30 sec  Tandem Stand - Left Foot in Front: 30 sec  Semi-Tandem Stand - Right Foot in Front: 30 sec  Semi-Tandem Stand - Left Foot in Front: 30 sec  Single Leg Stand - Right Foot: 10 sec  Single Leg Stand - Left Foot: 15 sec       Sit to Stand Testing      The patient completed 7 repetitions of a sit to stand transfer in 30 seconds. from 21.5" height; arms crossed at chest      Unremarkable Mobility Test Results  Unremarkable: Right Forward Step Up 6 Inches and Left Forward Step Up 6 Inches    Six Inch Forward Step Up - Right          Upper Extremity Support: bilateral UE assist required - light for balance    Six Inch Forward Step Up - Left          Upper Extremity Support: bilateral UE assist required - light for balance    Ambulation Assistance Required  Surface With  Assistive Device Without Assistive Device Details   Level   Independent      Uneven   Independent     Curb   Independent       Stairs Assistance Required   Assistance Level Upper Extremity Support Pattern   Ascending Independent Two rails Reciprocal   Descending Independent Two rails " "Non-reciprocal        Ambulation Details  Ambulation distance was 200 meters. Able to extend right knee on heel strike with fair+ activation of right quad, good foot clearance; no glaring gait deficits noted          Treatment:  Therapeutic Activity  Therapeutic Activity 1: Recumbent Bike - Level 1 x 10 mins  Therapeutic Activity 2: Quad sets - 10" hold x 2 mins  Therapeutic Activity 3: DKC with swiss ball x 2 mins  Therapeutic Activity 4: S/L hip abudction x 12  Therapeutic Activity 5: S/L clams x 15  Therapeutic Activity 6: Quantum: knee flexion - 20# 10 x 2  Therapeutic Activity 7: Quantum: SAQ's : 20# 10 x 2    Assessment & Plan   Assessment  Alessia presents with a condition of Low complexity.   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  Will be following up with neurosurgery in a couple of weeks for further evaluation.     Functional Limitations: Activity tolerance, Ambulating on uneven surfaces, Decreased ambulation distance/endurance, Getting off the floor, Participating in leisure activities, Squatting  Impairments: Activity intolerance, Impaired balance, Impaired physical strength    Patient Goal for Therapy (PT): To improve strength, functional mobility, balance and endurance for daily/recreational activities  Prognosis: Good  Assessment Details: Alessia is 68 yo who is now 3 1/2 months post Right TKA Revision.  She did very well with surgery; spent a couple of weeks at IPR, then another month with HHPT. She is demonstrating good AROM/PROM in her right knee, good activation of distal quad, able to perform SLR without quad lag.  Alessia demonstrates general core, hip and knee strength deficits with decreased endurance and balance for higher level activities.  She will benefit from Skilled physical therapy " intervention to address these deficits to maximize her return of full function.  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  She will be following up with neuro in a few weeks for further diagnostic inquiry if necessary.  She also just underwent a bronchoscopy to evaluate a lung nodule - pathology report on this is pending.   These co morbidities may affect her physical therapy down the road.     Plan  From a physical therapy perspective, the patient would benefit from: Skilled Rehab Services    Planned therapy interventions include: Therapeutic exercise, Therapeutic activities, Neuromuscular re-education, Manual therapy, and Aquatic therapy.            Visit Frequency: 2 times Per Week for 6 Weeks.       This plan was discussed with Patient and Therapy assistant.   Discussion participants: Agreed Upon Plan of Care             Patient's spiritual, cultural, and educational needs considered and patient agreeable to plan of care and goals.     Education  Education was done with Patient. The patient's learning style includes Demonstration and Listening. The patient Demonstrates understanding and Verbalizes understanding.         POC, incorporation of aquatics into plan;        Goals:   Active       LTG's - 6 weeks        Able to perform all household activities with no limitation        Start:  02/24/25    Expected End:  04/11/25            Able to ambulate community required distances with no limitation        Start:  02/24/25    Expected End:  04/11/25            Able to ascend/descend a flight of steps with single railing with reciprocal pattern, no limitation        Start:  02/24/25    Expected End:  04/11/25            Improve RLE strength by 1/2 grade to improve gait/balance and function        Start:  02/24/25    Expected End:  04/11/25            Right knee AROM - 0 - 125  degrees without increased symptoms.        Start:  02/24/25    Expected End:  04/11/25               STG's - 3 weeks        Demonstrate improvement in recent symptoms to progress toward long term goals        Start:  02/24/25    Expected End:  03/21/25            Correct postural deficits to reduce pain; improve postural awareness for injury prevention        Start:  02/24/25    Expected End:  03/21/25            Demonstrate compliance with initial exercise program        Start:  02/24/25    Expected End:  03/21/25                Trinh Reyes, PT

## 2025-02-26 NOTE — PROGRESS NOTES
February 26, 2025-the patient had a EBUS procedure with Dr. Cooper.  Squamous cell carcinoma was found.  I called the patient.  She relates she is going to follow up with MD Soliz.  Dr. Cooper is going to have her presented at his local cancer conference.  The patient is told to call if is anything we can do help.

## 2025-02-27 ENCOUNTER — TELEPHONE (OUTPATIENT)
Dept: HEMATOLOGY/ONCOLOGY | Facility: CLINIC | Age: 70
End: 2025-02-27
Payer: MEDICARE

## 2025-02-27 DIAGNOSIS — C34.90 MALIGNANT NEOPLASM OF UNSPECIFIED PART OF UNSPECIFIED BRONCHUS OR LUNG: Primary | ICD-10-CM

## 2025-02-27 NOTE — PROGRESS NOTES
"  Outpatient Rehab    Physical Therapy Visit    Patient Name: Alessia Nelson  MRN: [id number]  YOB: 1955  Encounter Date: 2/26/2025    Therapy Diagnosis:   Encounter Diagnosis   Name Primary?    Impaired functional mobility and activity tolerance Yes     Physician: Michele Singh MD    Physician Orders: Eval and Treat    Medical Diagnosis:  Z96.651 (ICD-10-CM) - Presence of right artificial knee joint   Visit # / Visits Authorized:  1 / 12  Date of Evaluation:  2/24/2025   Insurance Authorization Period: 2/1/2025 to 12/31/2025  Plan of Care Certification:  2/24/2025 to 5/24/2025      Time In: 1100   Time  Out: 1200  Total Time: 60   Total Billable Time: 30 mins -split billing per Medicare     FOTO:  Intake Score: 45 %  Survey Score 1:  %  Survey Score 2:  %         Subjective   Got some bad news just before leaving to come here; Pulmonologist called to tell her the lung nodule was malignant, also has one lymph node that was malignant as well.  Squamous cell CA.  Alessia is just trying tow rap her head around the idea of cancer. Will meet with oncology next week and make decisions on where she will receive treatment..  Pain reported as 4/10. right knee    Objective            Treatment:            Balance/Neuromuscular Re-Education  Balance/Neuromuscular Re-Education Activity 1: SAQ's over small gray roll - alternate LE's - 3 mins with 3#  Balance/Neuromuscular Re-Education Activity 2: Quad sets - 10" H x 2 mins - small towel roll under knee  Balance/Neuromuscular Re-Education Activity 3: Toe-Taps - 8" step while standing on Air-Ex x 2 mins  Balance/Neuromuscular Re-Education Activity 4: Achilles stretch on wedge - 30 sec x 3    Therapeutic Activity  Therapeutic Activity 1: Recumbent Bike - Level 1 x 10 mins  Therapeutic Activity 2: SLR - 5 x 3  Therapeutic Activity 3: Sit <> Stand x 12  Therapeutic Activity 4: S/L hip abudction x 12  Therapeutic Activity 5: S/L clams x 15    Assessment & Plan "   Assessment: Alessia was able to complete all exercises without exacerbation of knee symptoms; She is still feeling general fatigue today; Thoughts are elsewhere after the news of her cancer.  Will continue to progress activity as patient is able to tolerate per POC.  Evaluation/Treatment Tolerance: Patient tolerated treatment well    Patient will continue to benefit from skilled outpatient physical therapy to address the deficits listed in the problem list box on initial evaluation, provide pt/family education and to maximize pt's level of independence in the home and community environment.     Patient's spiritual, cultural, and educational needs considered and patient agreeable to plan of care and goals.           Plan: Continue with SKilled Physical Therapy to address deficits as result of TKA on right; Treatment 2x/week per POC    Goals:   Active       LTG's - 6 weeks        Able to perform all household activities with no limitation        Start:  02/24/25    Expected End:  04/11/25            Able to ambulate community required distances with no limitation        Start:  02/24/25    Expected End:  04/11/25            Able to ascend/descend a flight of steps with single railing with reciprocal pattern, no limitation        Start:  02/24/25    Expected End:  04/11/25            Improve RLE strength by 1/2 grade to improve gait/balance and function        Start:  02/24/25    Expected End:  04/11/25            Right knee AROM - 0 - 125 degrees without increased symptoms.        Start:  02/24/25    Expected End:  04/11/25               STG's - 3 weeks        Demonstrate improvement in recent symptoms to progress toward long term goals  (Progressing)       Start:  02/24/25    Expected End:  03/21/25            Correct postural deficits to reduce pain; improve postural awareness for injury prevention  (Progressing)       Start:  02/24/25    Expected End:  03/21/25            Demonstrate compliance with initial  exercise program  (Progressing)       Start:  02/24/25    Expected End:  03/21/25                Trinh Reyes, PT

## 2025-02-27 NOTE — NURSING
Spoke with patient and discussed the imaging orders and referrals ordered by Dr. Cooper for her newly diagnosed squamous cell lung cancer.   At this time we have her brain MRI scheduled in [place] on March 7th at 2:45 pm.  PET scan will be scheduled on Monday, March 10th at 12:45.   Patient states she is also currently being seen at [facility] for a lesion on her spine. She gave me permission to request records and imaging from [facility].   Will coordinate medonc and radonc appts to follow after those imaging appointments.

## 2025-02-27 NOTE — NURSING
Per Dr. Cooper, patient is newly diagnosed squamous cell lung cancer and needs referrals to med onc and rad onc.   Prior to multi d appts, patient needs brain mri and PET scan for staging. Will reach out to patient to schedule imaging, then will schedule multi d visits.   Orders for imaging placed at this time.

## 2025-02-28 ENCOUNTER — TELEPHONE (OUTPATIENT)
Dept: HEMATOLOGY/ONCOLOGY | Facility: CLINIC | Age: 70
End: 2025-02-28
Payer: MEDICARE

## 2025-02-28 NOTE — [employer] PATIENT INSTRUCTIONS
Discharge Summary/Instructions after an Endoscopic Procedure  Patient Name: Alessia Nelson  Patient MRN: [id number]  Patient YOB: 1955  Monday, November 2, 2020  Maurilio Rodriguez MD  RESTRICTIONS:  During your procedure today, you received medications for sedation.  These   medications may affect your judgment, balance and coordination.  Therefore,   for 24 hours, you have the following restrictions:   - DO NOT drive a car, operate machinery, make legal/financial decisions,   sign important papers or drink alcohol.    ACTIVITY:  Today: no heavy lifting, straining or running due to procedural   sedation/anesthesia.  The following day: return to full activity including work.  DIET:  Eat and drink normally unless instructed otherwise.     TREATMENT FOR COMMON SIDE EFFECTS:  - Mild abdominal pain, nausea, belching, bloating or excessive gas:  rest,   eat lightly and use a heating pad.  - Sore Throat: treat with throat lozenges and/or gargle with warm salt   water.  - Because air was used during the procedure, expelling large amounts of air   from your rectum or belching is normal.  - If a bowel prep was taken, you may not have a bowel movement for 1-3 days.    This is normal.  SYMPTOMS TO WATCH FOR AND REPORT TO YOUR PHYSICIAN:  1. Abdominal pain or bloating, other than gas cramps.  2. Chest pain.  3. Back pain.  4. Signs of infection such as: chills or fever occurring within 24 hours   after the procedure.  5. Rectal bleeding, which would show as bright red, maroon, or black stools.   (A tablespoon of blood from the rectum is not serious, especially if   hemorrhoids are present.)  6. Vomiting.  7. Weakness or dizziness.  GO DIRECTLY TO THE NEAREST EMERGENCY ROOM IF YOU HAVE ANY OF THE FOLLOWING:      Difficulty breathing              Chills and/or fever over 101 F   Persistent vomiting and/or vomiting blood   Severe abdominal pain   Severe chest pain   Black, tarry stools   Bleeding- more than one  Please ask her for some updates on her HR at home.    tablespoon   Any other symptom or condition that you feel may need urgent attention  Your doctor recommends these additional instructions:  If any biopsies were taken, your doctors clinic will contact you in 1 to 2   weeks with any results.  - Discharge patient to home.   - Resume previous diet.   - Await pathology results.   - Return to referring physician at appointment to be scheduled.  For questions, problems or results please call your physician - Maurilio Rodriguez MD at Work:  [phone].  [facility], EMERGENCY ROOM PHONE NUMBER: [phone]  IF A COMPLICATION OR EMERGENCY SITUATION ARISES AND YOU ARE UNABLE TO REACH   YOUR PHYSICIAN - GO DIRECTLY TO THE EMERGENCY ROOM.  Maurilio Rodriguez MD  11/2/2020 10:01:04 AM  This report has been verified and signed electronically.  [employer]

## 2025-02-28 NOTE — NURSING
Spoke with patient and confirmed Multi D appts with med onc and rad onc on Friday, Mar 14th. Scheduled there per Dr. William.   Date, times, and locations confirmed.

## 2025-03-03 ENCOUNTER — CLINICAL SUPPORT (OUTPATIENT)
Dept: REHABILITATION | Facility: HOSPITAL | Age: 70
End: 2025-03-03
Payer: MEDICARE

## 2025-03-03 DIAGNOSIS — Z74.09 IMPAIRED FUNCTIONAL MOBILITY AND ACTIVITY TOLERANCE: Primary | ICD-10-CM

## 2025-03-03 PROCEDURE — 97112 NEUROMUSCULAR REEDUCATION: CPT | Mod: PN

## 2025-03-03 PROCEDURE — 97530 THERAPEUTIC ACTIVITIES: CPT | Mod: PN

## 2025-03-04 NOTE — PROGRESS NOTES
"  Outpatient Rehab    Physical Therapy Visit    Patient Name: Alessia Nelson  MRN: [id number]  YOB: 1955  Encounter Date: 3/3/2025    Therapy Diagnosis:   Encounter Diagnosis   Name Primary?    Impaired functional mobility and activity tolerance Yes     Physician: Michele Singh MD    Physician Orders: Eval and Treat  Medical Diagnosis:  Z96.651 (ICD-10-CM) - Presence of right artificial knee joint     Visit # / Visits Authorized:  2/12   Date of Evaluation:  2/24/2025   Insurance Authorization Period: 2/1/2025 to 12/31/2025  Plan of Care Certification:  2/24/2025 to 5/24/2025        Time In: 1230   Time Out: 1330  Total Time: 60   Total Billable Time: 60 mins     FOTO:  Intake Score:  %  Survey Score 1:  %  Survey Score 2:  %         Subjective   Patient doing ok today; Has multiple appointments in the coming week to get scans, MRI's done and meet with oncology.  Pain reported as 2/10. Right knee    Objective      Treatment:      Balance/Neuromuscular Re-Education  Balance/Neuromuscular Re-Education Activity 1: SAQ's over small gray roll - alternate LE's - 3 mins with 3#  Balance/Neuromuscular Re-Education Activity 2: Quad sets - 10" H x 2 mins - small towel roll under knee  Balance/Neuromuscular Re-Education Activity 3: Toe-Taps - 8" step while standing on Air-Ex x 2 mins  Balance/Neuromuscular Re-Education Activity 4: Achilles stretch on wedge - 30 sec x 3  Balance/Neuromuscular Re-Education Activity 5: Squat x 10 cueing for improved hip hinging    Therapeutic Activity  Therapeutic Activity 1: Recumbent Bike - Level 2 x 15 mins  Therapeutic Activity 2: SLR - 5 x 3  Therapeutic Activity 3: Sit <> Stand x 12  Therapeutic Activity 4: S/L hip abudction x 12  Therapeutic Activity 5: S/L clams x 15  Therapeutic Activity 6: Step-ups: 6" step x 15 leading with each leg - forward step  Therapeutic Activity 7: Lateral step-up with RLE 6" step x 15  Therapeutic Activity 8: Bridges x 15  Therapeutic Activity 9: " Ball Squeezes x 2 mins              Assessment & Plan   Assessment: Better performance and tolerance of activities today; Right knee pain under control; Has multiple appointments coming up to further evaluate her situation following Lung Ca diagnosis.  Evaluation/Treatment Tolerance: Patient tolerated treatment well    Patient will continue to benefit from skilled outpatient physical therapy to address the deficits listed in the problem list box on initial evaluation, provide pt/family education and to maximize pt's level of independence in the home and community environment.     Patient's spiritual, cultural, and educational needs considered and patient agreeable to plan of care and goals.           Plan: Continue with SKilled Physical Therapy to address deficits as result of TKA on right; Treatment 2x/week per POC    Goals:   Active       LTG's - 6 weeks        Able to perform all household activities with no limitation        Start:  02/24/25    Expected End:  04/11/25            Able to ambulate community required distances with no limitation        Start:  02/24/25    Expected End:  04/11/25            Able to ascend/descend a flight of steps with single railing with reciprocal pattern, no limitation        Start:  02/24/25    Expected End:  04/11/25            Improve RLE strength by 1/2 grade to improve gait/balance and function        Start:  02/24/25    Expected End:  04/11/25            Right knee AROM - 0 - 125 degrees without increased symptoms.        Start:  02/24/25    Expected End:  04/11/25               STG's - 3 weeks        Demonstrate improvement in recent symptoms to progress toward long term goals  (Progressing)       Start:  02/24/25    Expected End:  03/21/25            Correct postural deficits to reduce pain; improve postural awareness for injury prevention  (Progressing)       Start:  02/24/25    Expected End:  03/21/25            Demonstrate compliance with initial exercise program   (Progressing)       Start:  02/24/25    Expected End:  03/21/25                Trinh Reyes, PT

## 2025-03-07 ENCOUNTER — HOSPITAL ENCOUNTER (OUTPATIENT)
Dept: RADIOLOGY | Facility: HOSPITAL | Age: 70
Discharge: HOME | End: 2025-03-07
Attending: INTERNAL MEDICINE
Payer: MEDICARE

## 2025-03-07 DIAGNOSIS — C34.90 MALIGNANT NEOPLASM OF UNSPECIFIED PART OF UNSPECIFIED BRONCHUS OR LUNG: ICD-10-CM

## 2025-03-07 PROCEDURE — 70553 MRI BRAIN STEM W/O & W/DYE: CPT | Mod: TC,PO

## 2025-03-07 PROCEDURE — A9585 GADOBUTROL INJECTION: HCPCS | Mod: PO | Performed by: INTERNAL MEDICINE

## 2025-03-07 PROCEDURE — 25500020 PHARM REV CODE 255: Mod: PO | Performed by: INTERNAL MEDICINE

## 2025-03-07 PROCEDURE — 70553 MRI BRAIN STEM W/O & W/DYE: CPT | Mod: 26,,, | Performed by: RADIOLOGY

## 2025-03-07 RX ORDER — GADOBUTROL 604.72 MG/ML
8.5 INJECTION INTRAVENOUS
Status: COMPLETED | OUTPATIENT
Start: 2025-03-07 | End: 2025-03-07

## 2025-03-07 RX ADMIN — GADOBUTROL 8.5 ML: 604.72 INJECTION INTRAVENOUS at 03:03

## 2025-03-10 ENCOUNTER — HOSPITAL ENCOUNTER (OUTPATIENT)
Dept: RADIOLOGY | Facility: HOSPITAL | Age: 70
Discharge: HOME | End: 2025-03-10
Attending: INTERNAL MEDICINE
Payer: MEDICARE

## 2025-03-10 DIAGNOSIS — C34.90 MALIGNANT NEOPLASM OF UNSPECIFIED PART OF UNSPECIFIED BRONCHUS OR LUNG: ICD-10-CM

## 2025-03-10 LAB — GLUCOSE SERPL-MCNC: 138 MG/DL (ref 70–110)

## 2025-03-10 PROCEDURE — 78815 PET IMAGE W/CT SKULL-THIGH: CPT | Mod: 26,PS,, | Performed by: RADIOLOGY

## 2025-03-10 PROCEDURE — 78815 PET IMAGE W/CT SKULL-THIGH: CPT | Mod: TC,PI,PN

## 2025-03-10 PROCEDURE — A9552 F18 FDG: HCPCS | Mod: PN | Performed by: INTERNAL MEDICINE

## 2025-03-10 RX ORDER — FLUDEOXYGLUCOSE F18 500 MCI/ML
12.9 INJECTION INTRAVENOUS
Status: COMPLETED | OUTPATIENT
Start: 2025-03-10 | End: 2025-03-10

## 2025-03-10 RX ADMIN — FLUDEOXYGLUCOSE F-18 12.9 MILLICURIE: 500 INJECTION INTRAVENOUS at 12:03

## 2025-03-10 NOTE — PROGRESS NOTES
PET Imaging Questionnaire    Are you a Diabetic? Recent Blood Sugar level? No    Are you anemic? Bone Marrow Stimulation Meds? Yes    Have you had a CT Scan, if so when & where was your last one? Yes -    Have you had a PET Scan, if so when & where was your last one? Yes -     Chemotherapy or currently on Chemotherapy? No    Radiation therapy? No    Surgical History:   Past Surgical History:   Procedure Laterality Date    ADRENAL GLAND SURGERY      APPENDECTOMY      BACK SURGERY      CAUDAL EPIDURAL STEROID INJECTION N/A 03/01/2021    Procedure: Injection-steroid-epidural-caudal;  Surgeon: Socorro Lorenz MD;  Location: [facility] OR;  Service: Pain Management;  Laterality: N/A;    CHOLECYSTECTOMY      COLONOSCOPY      COLONOSCOPY N/A 06/10/2021    Procedure: COLONOSCOPY;  Surgeon: Sheree Metz MD;  Location: [facility];  Service: Endoscopy;  Laterality: N/A;    COLONOSCOPY N/A 03/08/2022    Procedure: COLONOSCOPY;  Surgeon: Maurilio Rodriguez MD;  Location: [facility] ENDO ([address] [place] [address]);  Service: Endoscopy;  Laterality: N/A;  Pt to perform at-home COVID test morning of procedure-DS  2/24-Blood thinner approval rec'[facility] Dr. Walsh (see letters tab 2/24/22)-DS  3/2-Instructions sent via email-DS  3/7-Pt unable to come earlier due to ride-DS    CORONARY STENT PLACEMENT      CYSTOURETHROSCOPY N/A 11/13/2019    Procedure: CYSTOURETHROSCOPY;  Surgeon: Shun Nñuez MD;  Location: [facility] OR;  Service: Urology;  Laterality: N/A;    DIRECT DIAGNOSTIC LARYNGOSCOPY WITH BRONCHOSCOPY AND ESOPHAGOSCOPY N/A 01/03/2024    Procedure: LARYNGOSCOPY, DIRECT, DIAGNOSTIC, WITH BRONCHOSCOPY AND ESOPHAGOSCOPY;  Surgeon: Mir Belle MD;  Location: [facility] OR;  Service: ENT;  Laterality: N/A;    ENDOBRONCHIAL ULTRASOUND Bilateral 2/20/2025    Procedure: ENDOBRONCHIAL ULTRASOUND (EBUS);  Surgeon: Ismael Cooper MD;  Location: [facility] OR;  Service: Pulmonary;  Laterality: Bilateral;    ENDOSCOPIC ULTRASOUND OF LOWER GASTROINTESTINAL TRACT N/A  03/08/2022    Procedure: ULTRASOUND, LOWER GI TRACT, ENDOSCOPIC;  Surgeon: Maurilio Rodriguez MD;  Location: [facility] ENDO (2ND FLR);  Service: Endoscopy;  Laterality: N/A;  Pt to perform at-home COVID test morning of procedure-DS  2/24-Blood thinner approval [facility] Dr. Walsh (see letters tab 2/24/22)-DS  3/2-Instructions sent via email-DS  3/7-Pt unable to come earlier due to ride-DS    ENDOSCOPIC ULTRASOUND OF UPPER GASTROINTESTINAL TRACT N/A 11/25/2019    Procedure: ULTRASOUND, UPPER GI TRACT, ENDOSCOPIC;  Surgeon: Alhaji Bridges MD;  Location: [facility] ENDO (2ND FLR);  Service: Endoscopy;  Laterality: N/A;  5 day hold Plavix, Dr Jovanni Serrano - pg  PM prep    ENDOSCOPIC ULTRASOUND OF UPPER GASTROINTESTINAL TRACT N/A 11/02/2020    Procedure: ULTRASOUND, UPPER GI TRACT, ENDOSCOPIC;  Surgeon: Maurilio Rodriguez MD;  Location: [facility] ENDO (2ND FLR);  Service: Endoscopy;  Laterality: N/A;  5 day hold Plavix, Dr Roman Walsh - pg  Covid-19 test 10/30/20 at [facility]    EPIDURAL STEROID INJECTION INTO CERVICAL SPINE N/A 12/08/2021    Procedure: Injection-steroid-epidural-cervical;  Surgeon: Socorro Lorenz MD;  Location: [facility] OR;  Service: Pain Management;  Laterality: N/A;    EPIDURAL STEROID INJECTION INTO CERVICAL SPINE N/A 11/30/2023    Procedure: Injection-steroid-epidural-cervical;  Surgeon: Maurilio Carroll MD;  Location: [facility] OR;  Service: Anesthesiology;  Laterality: N/A;  c7-T1    ESOPHAGOGASTRODUODENOSCOPY N/A 06/10/2021    Procedure: EGD (ESOPHAGOGASTRODUODENOSCOPY);  Surgeon: Sheree Metz MD;  Location: [facility] ENDO;  Service: Endoscopy;  Laterality: N/A;    ESOPHAGOGASTRODUODENOSCOPY N/A 09/26/2024    Procedure: EGD (ESOPHAGOGASTRODUODENOSCOPY);  Surgeon: Jovanny Saldivar MD;  Location: [facility] ENDO;  Service: Endoscopy;  Laterality: N/A;    ESOPHAGOSCOPY, USING BOUGIE, WITH DILATION N/A 01/03/2024    Procedure: ESOPHAGOSCOPY, USING BOUGIE, WITH DILATION;  Surgeon: Mir Belle MD;  Location: [facility] OR;   Service: ENT;  Laterality: N/A;    FRACTURE SURGERY Left 05/02/2022    ankle    HERNIA REPAIR      HYSTERECTOMY      INCISIONAL HERNIA REPAIR      INJECTION OF ANESTHETIC AGENT AROUND NERVE Right 05/27/2019    Procedure: RIGHT L5-S3 MEDIAL BRANCH BLOCKS;  Surgeon: Sneha Aragon MD;  Location: [facility] OR;  Service: Pain Management;  Laterality: Right;  **DO NOT STOP PLAVIX**    INJECTION OF JOINT Right 09/22/2021    Procedure: Injection, Joint; Right SI Joint Injection;  Surgeon: Socorro Lorenz MD;  Location: [facility] OR;  Service: Pain Management;  Laterality: Right;  Oral    INSERTION OF DORSAL COLUMN NERVE STIMULATOR FOR TRIAL N/A 06/07/2021    Procedure: INSERTION, NEUROSTIMULATOR, SPINAL CORD, DORSAL COLUMN, FOR TRIAL;  Surgeon: Socorro Lorenz MD;  Location: [facility] OR;  Service: Pain Management;  Laterality: N/A;  nevro rep confirmed start time    instestine      bowel section    KNEE ARTHROPLASTY Right 12/18/2019    Procedure: ARTHROPLASTY, KNEE;  Surgeon: Ike Briceño II, MD;  Location: [facility] OR;  Service: Orthopedics;  Laterality: Right;    KNEE SURGERY  1983    OOPHORECTOMY      PARATHYROID GLAND SURGERY      3 surgeries    RADIOFREQUENCY ABLATION Right 06/10/2019    Procedure: Radiofrequency Ablation - RIGHT L3-5 RADIOFREQUENCY ABLATION WITH [employer] COOLIEF THERMAL SYSTEM;  Surgeon: Sneha Aragon MD;  Location: [facility] OR;  Service: Pain Management;  Laterality: Right;  **HOLD PLAVIX x 7 DAYS PRIOR**    REVISION OF KNEE ARTHROPLASTY Right     AVALA with Dr Singh    ROBOTIC BRONCHOSCOPY Bilateral 2/20/2025    Procedure: ROBOTIC BRONCHOSCOPY;  Surgeon: Ismael Cooper MD;  Location: [facility] OR;  Service: Pulmonary;  Laterality: Bilateral;    SPINAL CORD STIMULATOR REMOVAL N/A 02/21/2024    Procedure: REMOVAL, NEUROSTIMULATOR, SPINAL;  Surgeon: Maurilio Carroll MD;  Location: [facility] OR;  Service: Pain Management;  Laterality: N/A;  removal of spinal cord stimulator    UPPER GASTROINTESTINAL ENDOSCOPY          Have you  been fasting for at least 6 hours? Yes    Is there any chance you may be pregnant or breastfeeding? No    Assay: 13.8 MCi@:12:42   Injection Site:RT AC    Residual: 0.9 mCi@: 12:46   Technologist: Ike Prescott Injected:12.9 mCi

## 2025-03-12 ENCOUNTER — CLINICAL SUPPORT (OUTPATIENT)
Dept: REHABILITATION | Facility: HOSPITAL | Age: 70
End: 2025-03-12
Payer: MEDICARE

## 2025-03-12 DIAGNOSIS — Z74.09 IMPAIRED FUNCTIONAL MOBILITY AND ACTIVITY TOLERANCE: Primary | ICD-10-CM

## 2025-03-12 PROCEDURE — 97112 NEUROMUSCULAR REEDUCATION: CPT | Mod: PN

## 2025-03-12 PROCEDURE — 97530 THERAPEUTIC ACTIVITIES: CPT | Mod: PN

## 2025-03-13 NOTE — PROGRESS NOTES
"  Outpatient Rehab    Physical Therapy Visit    Patient Name: Alessia Nelson  MRN: [id number]  YOB: 1955  Encounter Date: 3/12/2025    Therapy Diagnosis:   Encounter Diagnosis   Name Primary?    Impaired functional mobility and activity tolerance Yes     Physician: Michele Singh MD    Physician Orders: Eval and Treat  Medical Diagnosis:  Z96.651 (ICD-10-CM) - Presence of right artificial knee joint     Visit # / Visits Authorized: 3 / 12   Date of Evaluation:  2/24/2025   Insurance Authorization Period: 2/1/2025 to 12/31/2025  Plan of Care Certification:  2/24/2025 to 5/24/2025      Time In: 1100   Time Out: 1200  Total Time: 60   Total Billable Time: 55    FOTO:  Intake Score:  %  Survey Score 1:  %  Survey Score 2:  %         Subjective   RIght knee pain noted today; Patient stated that her knee has been bothering her all weekend; swelling present as well; Had PET Scan and brain MRI performed Monday; Both looked OK.  Pain reported as 5/10. Right knee    Objective            Treatment:            Balance/Neuromuscular Re-Education  Balance/Neuromuscular Re-Education Activity 1: SAQ's over small gray roll - alternate LE's - 3 mins with 3#  Balance/Neuromuscular Re-Education Activity 2: Quad sets - 10" H x 2 mins - small towel roll under knee  Balance/Neuromuscular Re-Education Activity 3: Toe-Taps - 8" step while standing on Air-Ex x 2 mins  Balance/Neuromuscular Re-Education Activity 4: Achilles stretch on wedge - 30 sec x 3  Balance/Neuromuscular Re-Education Activity 5: Squat x 10 cueing for improved hip hinging  Balance/Neuromuscular Re-Education Activity 6: 3-way hip - x 15 - yellow tband    Therapeutic Activity  Therapeutic Activity 1: Recumbent Bike - Level 1 x 10 mins  Therapeutic Activity 2: SLR - 10 x 2  Therapeutic Activity 3: Sit <> Stand x 12  Therapeutic Activity 4: S/L hip abudction x 12  Therapeutic Activity 5: S/L clams x 15  Therapeutic Activity 6: Step-ups: 6" step x 15 leading with " "each leg - forward step  Therapeutic Activity 7: Lateral step-up with RLE 6" step x 15  Therapeutic Activity 8: Bridges x 15  Therapeutic Activity 9: Ball Squeezes x 2 mins              Assessment & Plan   Assessment: Taking things in stride with new cancer diagnosis; Going for myelogram tomorrow to investigate tumor in thoracic area; Meets with oncology people on Friday.  Knee function is progressing well; does have some increased mild swelling in knee, but nothing disturbing.       Patient will continue to benefit from skilled outpatient physical therapy to address the deficits listed in the problem list box on initial evaluation, provide pt/family education and to maximize pt's level of independence in the home and community environment.     Patient's spiritual, cultural, and educational needs considered and patient agreeable to plan of care and goals.           Plan: Continue with SKilled Physical Therapy to address deficits as result of TKA on right; Treatment 2x/week per POC    Goals:   Active       LTG's - 6 weeks        Able to perform all household activities with no limitation        Start:  02/24/25    Expected End:  04/11/25            Able to ambulate community required distances with no limitation        Start:  02/24/25    Expected End:  04/11/25            Able to ascend/descend a flight of steps with single railing with reciprocal pattern, no limitation        Start:  02/24/25    Expected End:  04/11/25            Improve RLE strength by 1/2 grade to improve gait/balance and function        Start:  02/24/25    Expected End:  04/11/25            Right knee AROM - 0 - 125 degrees without increased symptoms.        Start:  02/24/25    Expected End:  04/11/25               STG's - 3 weeks        Demonstrate improvement in recent symptoms to progress toward long term goals  (Progressing)       Start:  02/24/25    Expected End:  03/21/25            Correct postural deficits to reduce pain; improve " postural awareness for injury prevention  (Progressing)       Start:  02/24/25    Expected End:  03/21/25            Demonstrate compliance with initial exercise program  (Progressing)       Start:  02/24/25    Expected End:  03/21/25                Trinh Reyes, PT

## 2025-03-14 ENCOUNTER — OFFICE VISIT (OUTPATIENT)
Dept: HEMATOLOGY/ONCOLOGY | Facility: CLINIC | Age: 70
End: 2025-03-14
Payer: MEDICARE

## 2025-03-14 ENCOUNTER — PATIENT MESSAGE (OUTPATIENT)
Dept: ADMINISTRATIVE | Facility: OTHER | Age: 70
End: 2025-03-14
Payer: MEDICARE

## 2025-03-14 ENCOUNTER — OFFICE VISIT (OUTPATIENT)
Dept: RADIATION ONCOLOGY | Facility: CLINIC | Age: 70
End: 2025-03-14
Payer: MEDICARE

## 2025-03-14 VITALS
OXYGEN SATURATION: 97 % | BODY MASS INDEX: 30.42 KG/M2 | HEIGHT: 67 IN | DIASTOLIC BLOOD PRESSURE: 80 MMHG | TEMPERATURE: 98 F | HEART RATE: 86 BPM | RESPIRATION RATE: 16 BRPM | SYSTOLIC BLOOD PRESSURE: 152 MMHG | WEIGHT: 193.81 LBS

## 2025-03-14 DIAGNOSIS — K86.89 PANCREATIC INSUFFICIENCY: ICD-10-CM

## 2025-03-14 DIAGNOSIS — C34.90 MALIGNANT NEOPLASM OF UNSPECIFIED PART OF UNSPECIFIED BRONCHUS OR LUNG: Primary | ICD-10-CM

## 2025-03-14 DIAGNOSIS — C34.31 CANCER OF LOWER LOBE OF RIGHT LUNG: Primary | ICD-10-CM

## 2025-03-14 DIAGNOSIS — E03.9 HYPOTHYROIDISM, UNSPECIFIED TYPE: ICD-10-CM

## 2025-03-14 DIAGNOSIS — M54.9 UPPER BACK PAIN: ICD-10-CM

## 2025-03-14 DIAGNOSIS — C34.90 MALIGNANT NEOPLASM OF UNSPECIFIED PART OF UNSPECIFIED BRONCHUS OR LUNG: ICD-10-CM

## 2025-03-14 DIAGNOSIS — I10 HYPERTENSION, UNSPECIFIED TYPE: ICD-10-CM

## 2025-03-14 DIAGNOSIS — E31.21 MEN1 (MULTIPLE ENDOCRINE NEOPLASIA): ICD-10-CM

## 2025-03-14 PROCEDURE — 99205 OFFICE O/P NEW HI 60 MIN: CPT | Mod: S$PBB,,, | Performed by: INTERNAL MEDICINE

## 2025-03-14 PROCEDURE — 99999 PR PBB SHADOW E&M-EST. PATIENT-LVL V: CPT | Mod: PBBFAC,,, | Performed by: INTERNAL MEDICINE

## 2025-03-14 PROCEDURE — 99211 OFF/OP EST MAY X REQ PHY/QHP: CPT | Mod: PBBFAC,PN | Performed by: RADIOLOGY

## 2025-03-14 PROCEDURE — 99999 PR PBB SHADOW E&M-EST. PATIENT-LVL I: CPT | Mod: PBBFAC,,, | Performed by: RADIOLOGY

## 2025-03-14 PROCEDURE — 99215 OFFICE O/P EST HI 40 MIN: CPT | Mod: PBBFAC,27,PN | Performed by: INTERNAL MEDICINE

## 2025-03-14 PROCEDURE — G2211 COMPLEX E/M VISIT ADD ON: HCPCS | Mod: S$PBB,,, | Performed by: INTERNAL MEDICINE

## 2025-03-14 NOTE — PROGRESS NOTES
PATIENT: Alessia Nelson  MRN: [id number]  DATE: 3/15/2025      Diagnosis:   1. Malignant neoplasm of unspecified part of unspecified bronchus or lung    2. Upper back pain    3. MEN1 (multiple endocrine neoplasia)    4. Hypertension, unspecified type    5. Hypothyroidism, unspecified type    6. Pancreatic insufficiency        Chief Complaint: Establish Care (New pt; referred for RLL squamous cell )      Oncologic History:      Oncologic History     Oncologic Treatment     Pathology           Subjective:    Initial History: Ms. Nelson is a 69 y.o. female with CAD, CHF, COPD, depression, GERD, HTN, HLD, Seizures, h/o CVA, thyroid disease who presents for NSCLC.  The patient was being followed for a nodule in the right lower lobe and had PET-CT on 05/22/2024 which showed no avidity.  CT chest on 11/11/2024 showed enlargement of the ground-glass right lower lobe lung nodule measuring 18 x 19 mm and small bilateral soft tissue pulmonary nodules measuring 3-8 mm.  The patient underwent robotic bronchoscopy and EBUS on 02/20/2025 with pathology showing squamous cell carcinoma and biopsy of right lower lung nodule and a positive station 7 lymph node.  Stations 4R, 11R S and 11 RI were negative.  TEMPUS tissue testing showed mutations in CDKN2A, FAT1, and TP53.  PD-L1 was <1%.  The patient underwent CT myelogram and MRI of the thoracic spine 03/13/2025 for chronic upper back pain with radiation down bilateral arms.  Report is currently not available.    Currently the patient endorses intermittent pain between the shoulder blades with pain radiating down both arms.  The patient has occasional numbness in her fingertips.  The patient also endorses pain in her right hip which she states is chronic.  The patient endorses chronic cough which occasionally is productive with phlegm.  The patient endorses abdominal pain on occasion, intermittent diarrhea, occasional nausea, and occasional headaches.    Past Medical History:    Past Medical History:   Diagnosis Date    Acute pancreatitis     Back pain     CAD (coronary artery disease)     CHF (congestive heart failure)     COPD (chronic obstructive pulmonary disease)     Depression     Diverticulosis     Encounter for blood transfusion     GERD (gastroesophageal reflux disease)     History of blood clots     1986 AFTER BOWEL RESCETION    History of bowel resection     Hyperlipidemia     Hypertension     Peritonitis     PONV (postoperative nausea and vomiting)     Pulmonary nodule     Seizures     HAD A SEIZURE FROM PHENERGAN     Stroke     left sided weakness    Thyroid disease     TIA (transient ischemic attack)     Wears dentures     upper       Past Surgical HIstory:   Past Surgical History:   Procedure Laterality Date    ADRENAL GLAND SURGERY      APPENDECTOMY      BACK SURGERY      CAUDAL EPIDURAL STEROID INJECTION N/A 03/01/2021    Procedure: Injection-steroid-epidural-caudal;  Surgeon: Socorro Lorenz MD;  Location: [facility] OR;  Service: Pain Management;  Laterality: N/A;    CHOLECYSTECTOMY      COLONOSCOPY      COLONOSCOPY N/A 06/10/2021    Procedure: COLONOSCOPY;  Surgeon: Sheree Metz MD;  Location: [facility] ENDO;  Service: Endoscopy;  Laterality: N/A;    COLONOSCOPY N/A 03/08/2022    Procedure: COLONOSCOPY;  Surgeon: Maurilio Rodriguez MD;  Location: [facility] ENDO ([address] [place] [address]);  Service: Endoscopy;  Laterality: N/A;  Pt to perform at-home COVID test morning of procedure-DS  2/24-Blood thinner approval rec'[facility] Dr. Walsh (see letters tab 2/24/22)-DS  3/2-Instructions sent via email-DS  3/7-Pt unable to come earlier due to ride-DS    CORONARY STENT PLACEMENT      CYSTOURETHROSCOPY N/A 11/13/2019    Procedure: CYSTOURETHROSCOPY;  Surgeon: Shun Nuñez MD;  Location: [facility] OR;  Service: Urology;  Laterality: N/A;    DIRECT DIAGNOSTIC LARYNGOSCOPY WITH BRONCHOSCOPY AND ESOPHAGOSCOPY N/A 01/03/2024    Procedure: LARYNGOSCOPY, DIRECT, DIAGNOSTIC, WITH BRONCHOSCOPY AND ESOPHAGOSCOPY;   Surgeon: Mir Belle MD;  Location: [facility] OR;  Service: ENT;  Laterality: N/A;    ENDOBRONCHIAL ULTRASOUND Bilateral 2/20/2025    Procedure: ENDOBRONCHIAL ULTRASOUND (EBUS);  Surgeon: Ismael Cooper MD;  Location: [facility] OR;  Service: Pulmonary;  Laterality: Bilateral;    ENDOSCOPIC ULTRASOUND OF LOWER GASTROINTESTINAL TRACT N/A 03/08/2022    Procedure: ULTRASOUND, LOWER GI TRACT, ENDOSCOPIC;  Surgeon: Maurilio Rodriguez MD;  Location: [facility] (2ND FLR);  Service: Endoscopy;  Laterality: N/A;  Pt to perform at-home COVID test morning of procedure-DS  2/24-Blood thinner approval rec'[facility] Dr. Walsh (see letters tab 2/24/22)-DS  3/2-Instructions sent via email-DS  3/7-Pt unable to come earlier due to ride-DS    ENDOSCOPIC ULTRASOUND OF UPPER GASTROINTESTINAL TRACT N/A 11/25/2019    Procedure: ULTRASOUND, UPPER GI TRACT, ENDOSCOPIC;  Surgeon: Alhaji Bridges MD;  Location: [facility] (2ND FLR);  Service: Endoscopy;  Laterality: N/A;  5 day hold Plavix, Dr Jovanni Serrano - pg  PM prep    ENDOSCOPIC ULTRASOUND OF UPPER GASTROINTESTINAL TRACT N/A 11/02/2020    Procedure: ULTRASOUND, UPPER GI TRACT, ENDOSCOPIC;  Surgeon: Maurilio Rodriguez MD;  Location: [facility] (2ND FLR);  Service: Endoscopy;  Laterality: N/A;  5 day hold Plavix, Dr Roman Walsh - pg  Covid-19 test 10/30/20 at [facility] [place]    EPIDURAL STEROID INJECTION INTO CERVICAL SPINE N/A 12/08/2021    Procedure: Injection-steroid-epidural-cervical;  Surgeon: Socorro Lorenz MD;  Location: [facility] OR;  Service: Pain Management;  Laterality: N/A;    EPIDURAL STEROID INJECTION INTO CERVICAL SPINE N/A 11/30/2023    Procedure: Injection-steroid-epidural-cervical;  Surgeon: Maurilio Carroll MD;  Location: [facility] OR;  Service: Anesthesiology;  Laterality: N/A;  c7-T1    ESOPHAGOGASTRODUODENOSCOPY N/A 06/10/2021    Procedure: EGD (ESOPHAGOGASTRODUODENOSCOPY);  Surgeon: Sheree Metz MD;  Location: [facility] ENDO;  Service: Endoscopy;  Laterality: N/A;     ESOPHAGOGASTRODUODENOSCOPY N/A 09/26/2024    Procedure: EGD (ESOPHAGOGASTRODUODENOSCOPY);  Surgeon: Jovanny Saldivar MD;  Location: [facility];  Service: Endoscopy;  Laterality: N/A;    ESOPHAGOSCOPY, USING BOUGIE, WITH DILATION N/A 01/03/2024    Procedure: ESOPHAGOSCOPY, USING BOUGIE, WITH DILATION;  Surgeon: Mir Belle MD;  Location: [facility] OR;  Service: ENT;  Laterality: N/A;    FRACTURE SURGERY Left 05/02/2022    ankle    HERNIA REPAIR      HYSTERECTOMY      INCISIONAL HERNIA REPAIR      INJECTION OF ANESTHETIC AGENT AROUND NERVE Right 05/27/2019    Procedure: RIGHT L5-S3 MEDIAL BRANCH BLOCKS;  Surgeon: Sneha Aragon MD;  Location: [facility] OR;  Service: Pain Management;  Laterality: Right;  **DO NOT STOP PLAVIX**    INJECTION OF JOINT Right 09/22/2021    Procedure: Injection, Joint; Right SI Joint Injection;  Surgeon: Socorro Lorenz MD;  Location: [facility];  Service: Pain Management;  Laterality: Right;  Oral    INSERTION OF DORSAL COLUMN NERVE STIMULATOR FOR TRIAL N/A 06/07/2021    Procedure: INSERTION, NEUROSTIMULATOR, SPINAL CORD, DORSAL COLUMN, FOR TRIAL;  Surgeon: Socorro Lorezn MD;  Location: [facility] OR;  Service: Pain Management;  Laterality: N/A;  nevro rep confirmed start time    instestine      bowel section    KNEE ARTHROPLASTY Right 12/18/2019    Procedure: ARTHROPLASTY, KNEE;  Surgeon: Ike Briceño II, MD;  Location: [facility] OR;  Service: Orthopedics;  Laterality: Right;    KNEE SURGERY  1983    OOPHORECTOMY      PARATHYROID GLAND SURGERY      3 surgeries    RADIOFREQUENCY ABLATION Right 06/10/2019    Procedure: Radiofrequency Ablation - RIGHT L3-5 RADIOFREQUENCY ABLATION WITH HALYARD COOLIEF THERMAL SYSTEM;  Surgeon: Sneha Aragon MD;  Location: [facility] OR;  Service: Pain Management;  Laterality: Right;  **HOLD PLAVIX x 7 DAYS PRIOR**    REVISION OF KNEE ARTHROPLASTY Right     AVALA with Dr Singh    ROBOTIC BRONCHOSCOPY Bilateral 2/20/2025    Procedure: ROBOTIC BRONCHOSCOPY;  Surgeon: Kenneth  "MD Ismael;  Location: [facility] OR;  Service: Pulmonary;  Laterality: Bilateral;    SPINAL CORD STIMULATOR REMOVAL N/A 02/21/2024    Procedure: REMOVAL, NEUROSTIMULATOR, SPINAL;  Surgeon: Maurilio Carroll MD;  Location: [facility] OR;  Service: Pain Management;  Laterality: N/A;  removal of spinal cord stimulator    UPPER GASTROINTESTINAL ENDOSCOPY         Family History:   Family History   Problem Relation Name Age of Onset    Stroke Maternal Grandmother      Cancer Maternal Grandfather      Stroke Mother      Heart disease Father      Breast cancer Sister         Social History:  reports that she quit smoking about 2 years ago. Her smoking use included cigarettes. She started smoking about 48 years ago. She has a 46 pack-year smoking history. She has never used smokeless tobacco. She reports that she does not currently use alcohol. She reports current drug use.    Allergies:  Review of patient's allergies indicates:   Allergen Reactions    Aspirin Other (See Comments)     "Makes stomach feel like it's on fire"    Phenergan [promethazine] Other (See Comments)     SEIZURES    Nickel     Lortab [hydrocodone-acetaminophen] Itching     Able to take generic Norco       Medications:  Current Medications[1]    Review of Systems   Constitutional:  Negative for appetite change, chills, fatigue, fever and unexpected weight change.   HENT:  Negative for sore throat and trouble swallowing.    Eyes:  Negative for photophobia, pain and visual disturbance.   Respiratory:  Positive for cough (with occasional phlegm). Negative for chest tightness and shortness of breath.    Cardiovascular:  Negative for chest pain, palpitations and leg swelling.   Gastrointestinal:  Positive for abdominal pain, diarrhea and nausea. Negative for constipation and vomiting.   Genitourinary:  Negative for difficulty urinating and dysuria.   Musculoskeletal:  Positive for back pain. Negative for arthralgias.   Skin:  Negative for color change and rash. " "  Neurological:  Positive for headaches (on occasion). Negative for dizziness, weakness, light-headedness and numbness.   Hematological:  Negative for adenopathy. Does not bruise/bleed easily.       ECOG Performance Status: 2   Objective:      Vitals:   Vitals:    03/14/25 1338   BP: (!) 152/80   BP Location: Right arm   Patient Position: Sitting   Pulse: 86   Resp: 16   Temp: 98.1 °F (36.7 °C)   TempSrc: Temporal   SpO2: 97%   Weight: 87.9 kg (193 lb 12.6 oz)   Height: 5' 7" (1.702 m)     Physical Exam  Constitutional:       General: She is not in acute distress.     Appearance: She is well-developed. She is not diaphoretic.   HENT:      Head: Normocephalic and atraumatic.   Eyes:      General: No scleral icterus.        Right eye: No discharge.         Left eye: No discharge.   Cardiovascular:      Rate and Rhythm: Normal rate and regular rhythm.      Heart sounds: Normal heart sounds. No murmur heard.     No friction rub. No gallop.   Pulmonary:      Effort: Pulmonary effort is normal. No respiratory distress.      Breath sounds: Normal breath sounds. No wheezing or rales.   Chest:      Chest wall: No tenderness.   Abdominal:      General: Bowel sounds are normal. There is no distension.      Palpations: Abdomen is soft. There is no mass.      Tenderness: There is no abdominal tenderness. There is no guarding or rebound.   Musculoskeletal:         General: No tenderness. Normal range of motion.   Lymphadenopathy:      Cervical: No cervical adenopathy.      Upper Body:      Right upper body: No supraclavicular or axillary adenopathy.      Left upper body: No supraclavicular or axillary adenopathy.   Skin:     Findings: No erythema or rash.   Neurological:      Mental Status: She is alert and oriented to person, place, and time.   Psychiatric:         Behavior: Behavior normal.         Laboratory Data:  Hospital Outpatient Visit on 03/10/2025   Component Date Value Ref Range Status    POC Glucose 03/10/2025 138 (A) "  70 - 110 MG/DL Final         Imaging:     MRI Brain 3/07/25  Gray matter distinction is preserved throughout the brain parenchyma. The ventricles are midline without mass effect. There is mild periventricular and deep white matter FLAIR and T2 hyperintensities likely reflecting changes of chronic microvascular ischemia. No intra-axial hemorrhage or restricted diffusion. No intra-axial fluid collection or mass. Bone marrow signal of the calvarium is within normal limits. Major vascular flow voids are within normal limits. The orbits globes and optic nerves are grossly unremarkable. Paranasal sinuses are unremarkable.    PET/CT 3/10/25  Quality of the study: Adequate.     There is increased FDG avidity within a posterior right upper lobe pulmonary nodule measuring 2.0 cm on transmission CT images. This shows max SUV of 3.6 on today's exam versus 1.8 on prior exam.     No other abnormal regions of FDG uptake are identified.     Physiologic uptake of the tracer is present within the brain, salivary glands, myocardium, GI and  tracts.     Incidental CT findings: N/A       Assessment:       1. Malignant neoplasm of unspecified part of unspecified bronchus or lung    2. Upper back pain    3. MEN1 (multiple endocrine neoplasia)    4. Hypertension, unspecified type    5. Hypothyroidism, unspecified type    6. Pancreatic insufficiency           Plan:     NSCLC - Pt with stage IIIa pT1cN2 NSCLC SCC of the RLL with met to station 7 LN  -TEMPUS tissue testing showed mutations in CDKN2A, FAT1, and TP53.  PD-L1 was <1%  -MRI brain KRISHNA on 3/07/25  -Treatment with chemo/XRT discussed  -Pt given information on Carboplatin and Paclitaxel  -Side effects of each medication discussed  -PT instructed she would benefit from additional Durvalumab treatment for a year after chemo/XRT  -Will obtain report from MRI T spine and myelogram done 3/13/25 with pt to return to clinic next week    Upper Back Pain - chronic  -PT following pain  management for medication  -Will follow up CT myelogram and MRI spine as above    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  -Pt to see genetecist    HTN - pt on amlodipine, hydralazine, metoprolol  -BP slightly elevated  -Will monitor    Hypothyroidism - pt on levothyroxine 75mcg daily  -WIll monitor    Pancreatic Insufficiency - due to prior chronic pancreatitis   -Patient on Zenpep   -Management per GI    Route Chart for Scheduling    Med Onc Chart Routing      Follow up with physician 1 week. Pt needs appt with general surgery for PORT placement.  Pt needs appt with me next week to go over MRI Thoracic spine report from [facility].  Pt needs appt with oncology psychology and an appt with genetics.  Pt needs a CBC and CMP prior to her appt with me next week.   Follow up with [facility]    Infusion scheduling note    Injection scheduling note    Labs    Imaging    Pharmacy appointment    Other referrals                       Sea William MD  [facility]  Hematology and Oncology  [facility]   [address] [place] [address]   O: [phone]  F: [phone]             [1]   Current Outpatient Medications   Medication Sig Dispense Refill    acetaminophen (TYLENOL) 325 MG tablet Take 2 tablets (650 mg total) by mouth every 6 (six) hours as needed for Pain (Do not take with any other tylenol containing products).  0    allopurinoL (ZYLOPRIM) 300 MG tablet TAKE 1 TABLET DAILY (Patient taking differently: Take 300 mg by mouth Daily.) 90 tablet 1    amLODIPine (NORVASC) 5 MG tablet TAKE 1 TABLET(5 MG) BY MOUTH TWICE DAILY 180 tablet 1    busPIRone (BUSPAR) 5 MG Tab Take 1 tablet (5 mg total) by mouth 2 (two) times daily. 180 tablet 3    calcium citrate-vitamin D3 315-200 mg (CITRACAL+D) 315 mg-5 mcg (200 unit) per tablet Take 1 tablet by mouth once daily.      clopidogreL (PLAVIX) 75 mg tablet Take 1 tablet (75 mg total) by mouth once daily. 90 tablet 3    colestipoL (COLESTID) 1 gram Tab  TAKE 2 TABLETS ONCE DAILY (Patient taking differently: Take 2 g by mouth once daily.) 180 tablet 1    cyanocobalamin 1,000 mcg/mL injection INJECT 1 ML IN THE MUSCLE EVERY 14 DAYS (Patient taking differently: Inject 1,000 mcg into the muscle every 14 (fourteen) days.) 10 mL 0    EScitalopram oxalate (LEXAPRO) 20 MG tablet TAKE 1 TABLET EVERY EVENING 90 tablet 3    fluticasone propionate (FLONASE) 50 mcg/actuation nasal spray 2 sprays (100 mcg total) by Each Nostril route 2 (two) times daily. 48 g 3    furosemide (LASIX) 20 MG tablet TAKE 1 TABLET(20 MG) BY MOUTH EVERY DAY 90 tablet 3    gabapentin (NEURONTIN) 400 MG capsule Take 1 capsule (400 mg total) by mouth 2 (two) times daily. 180 capsule 3    hydrALAZINE (APRESOLINE) 25 MG tablet Take 1 tablet (25 mg total) by mouth daily as needed (Check BP after taking amlodipine, Lasix, metoprolol.  If it continues to stay greater than 160/100 take hydralazine). 30 tablet 0    levothyroxine (SYNTHROID) 75 MCG tablet TAKE 1 TABLET DAILY 90 tablet 3    magnesium oxide (MAG-OX) 400 mg (241.3 mg magnesium) tablet Take 1 tablet (400 mg total) by mouth once daily.      methocarbamoL (ROBAXIN) 750 MG Tab Take 1 tablet (750 mg total) by mouth every 8 (eight) hours as needed (muscle spasm). 270 tablet 3    metoprolol succinate (TOPROL-XL) 50 MG 24 hr tablet Take 1 tablet (50 mg total) by mouth once daily. Pt takes 1/2 tab daily 90 tablet 2    oxyCODONE (OXY-IR) 5 mg Cap Take 5 mg by mouth every 4 (four) hours as needed for Pain.      pantoprazole (PROTONIX) 40 MG tablet TAKE 1 TABLET DAILY (Patient taking differently: Take 40 mg by mouth once daily.) 90 tablet 3    potassium chloride SA (K-DUR,KLOR-CON M) 10 MEQ tablet Take 1 tablet (10 mEq total) by mouth once daily. (Patient taking differently: Take 10 mEq by mouth nightly.) 90 tablet 3    rosuvastatin (CRESTOR) 10 MG tablet TAKE 1 TABLET DAILY (Patient taking differently: Take 10 mg by mouth once daily.) 90 tablet 3    traZODone  (DESYREL) 100 MG tablet TAKE 1 TABLET EVERY EVENING (Patient taking differently: Take 100 mg by mouth every evening.) 90 tablet 3    vit A/vit C/vit E/zinc/copper (PRESERVISION AREDS ORAL) Take 1 capsule by mouth 2 (two) times a day.      vitamin D (VITAMIN D3) 1000 units Tab Take 1,000 Units by mouth 2 (two) times a day.      ZENPEP 40,000-126,000- 168,000 unit CpDR Take 1 capsule by mouth once daily.       No current facility-administered medications for this visit.     Facility-Administered Medications Ordered in Other Visits   Medication Dose Route Frequency Provider Last Rate Last Admin    oxyCODONE immediate release tablet 5 mg  5 mg Oral Once PRN Paul Nunez MD

## 2025-03-14 NOTE — PROGRESS NOTES
03/14/2025    [facility]   Radiation Oncology Consultation    ASSESSMENT  Alessia Nelson is a 69 y.o. y/o female with Stage IIIA T1N2M0 RLL squamous cell carcinoma s/p bronch and EBUS with positive station 7 lymph node.       PLAN    Treatment options were discussed with the patient including chemoradiotherapy.  Patient also being worked up for a thoracic spine lesion by Tin. Requesting records.  We discussed the goals of treatment to be curative.  The risks, benefits, scheduling, alternatives to and rationale of radiation therapy were explained in detail.    After this discussion, she elected to proceed with chemoradiation.    Consent was obtained and all questions were answered to the best of my ability  A CT simulation will be performed after thoracic conference discussion to begin the planning process for the patient's radiation therapy.     She was given our contact information, and she was told that she could call our clinic at any time if she has any questions or concerns.      Radiation Treatment Details:   We plan to treat RLL and level 7 node to a dose of 66 Gy in 30 fractions at 2.2 Gy per fraction delivered daily  We will utilize a(n) IMRT technique.   IMRT is medically necessary to treat complex dose painted target volumes in the thorax region with concave and convex isodose lines with steep isodose gradients to spare multiple adjacent organs at risk including the lung, heart, esophagus  We will utilize daily CT image guidance due to the need for accurate daily patient alignment to treat the target volumes accurately and avoid radiation overdose to multiple regional organs at risk since we are treating the patient with complex target volumes with multiple steep isodose gradients.         Lung Cancer.      Oncology History    No history exists.     HPI: Mrs. Nelson had CT chest 11/11/24 which reveaeld:    1. Enlarging ground-glass nodule of the right lower lobe.  Further  evaluation with PET scan as deemed clinically necessary to evaluate for malignancy.  At a minimum 3-6 month follow-up imaging recommended.  2. Small pulmonary nodules.  Follow-up per Fleischner guidelines.  For multiple solid nodules with any 6 mm or greater, Fleischner Society guidelines recommend follow up with non-contrast chest CT at 3-6 months and 18-24 months after discovery.  3. Hepatomegaly.    Bronchoscopy and EBUS was performed 2/20/25 pathology revealed squamous cell carcinoma in the RLL and station 7. Negative LN biopsy station 4R 11RS 11RI.    PET/CT 3/10/25 revealed:  The previously identified ground-glass density within the posterior aspect of the right upper lobe of the lung with minimal FDG uptake shows increased in size on transmission CT and increased FDG avidity compared with what was seen on 05/22/2024. This is concerning for malignancy. This report was flagged in Epic as abnormal.     MRI brain negative.    Possibility of pregnancy: No  History of prior irradiation: No  History of prior systemic anti-cancer therapy: No  History of collagen vascular disease: No  Implanted electronic device (pacer/defib/nerve stimulator): No    Past Medical History:   Diagnosis Date    Acute pancreatitis     Back pain     CAD (coronary artery disease)     CHF (congestive heart failure)     COPD (chronic obstructive pulmonary disease)     Depression     Diverticulosis     Encounter for blood transfusion     GERD (gastroesophageal reflux disease)     History of blood clots     1986 AFTER BOWEL RESCETION    History of bowel resection     Hyperlipidemia     Hypertension     Peritonitis     PONV (postoperative nausea and vomiting)     Pulmonary nodule     Seizures     HAD A SEIZURE FROM PHENERGAN     Stroke     left sided weakness    Thyroid disease     TIA (transient ischemic attack)     Wears dentures     upper       Past Surgical History:   Procedure Laterality Date    ADRENAL GLAND SURGERY      APPENDECTOMY       BACK SURGERY      CAUDAL EPIDURAL STEROID INJECTION N/A 03/01/2021    Procedure: Injection-steroid-epidural-caudal;  Surgeon: Socorro Lorenz MD;  Location: [facility] OR;  Service: Pain Management;  Laterality: N/A;    CHOLECYSTECTOMY      COLONOSCOPY      COLONOSCOPY N/A 06/10/2021    Procedure: COLONOSCOPY;  Surgeon: Sheree Metz MD;  Location: [facility] ENDO;  Service: Endoscopy;  Laterality: N/A;    COLONOSCOPY N/A 03/08/2022    Procedure: COLONOSCOPY;  Surgeon: Maurilio Rodriguez MD;  Location: [facility] (2ND FLR);  Service: Endoscopy;  Laterality: N/A;  Pt to perform at-home COVID test morning of procedure-DS  2/24-Blood thinner approval rec'[facility] Dr. Walsh (see letters tab 2/24/22)-DS  3/2-Instructions sent via email-DS  3/7-Pt unable to come earlier due to ride-DS    CORONARY STENT PLACEMENT      CYSTOURETHROSCOPY N/A 11/13/2019    Procedure: CYSTOURETHROSCOPY;  Surgeon: Shun Nuñez MD;  Location: [facility] OR;  Service: Urology;  Laterality: N/A;    DIRECT DIAGNOSTIC LARYNGOSCOPY WITH BRONCHOSCOPY AND ESOPHAGOSCOPY N/A 01/03/2024    Procedure: LARYNGOSCOPY, DIRECT, DIAGNOSTIC, WITH BRONCHOSCOPY AND ESOPHAGOSCOPY;  Surgeon: Mir Belle MD;  Location: [facility] OR;  Service: ENT;  Laterality: N/A;    ENDOBRONCHIAL ULTRASOUND Bilateral 2/20/2025    Procedure: ENDOBRONCHIAL ULTRASOUND (EBUS);  Surgeon: Ismael Cooper MD;  Location: [facility] OR;  Service: Pulmonary;  Laterality: Bilateral;    ENDOSCOPIC ULTRASOUND OF LOWER GASTROINTESTINAL TRACT N/A 03/08/2022    Procedure: ULTRASOUND, LOWER GI TRACT, ENDOSCOPIC;  Surgeon: Maurilio Rodriguez MD;  Location: [facility] (2ND FLR);  Service: Endoscopy;  Laterality: N/A;  Pt to perform at-home COVID test morning of procedure-DS  2/24-Blood thinner approval rec'[facility] Dr. Walsh (see letters tab 2/24/22)-DS  3/2-Instructions sent via email-DS  3/7-Pt unable to come earlier due to ride-DS    ENDOSCOPIC ULTRASOUND OF UPPER GASTROINTESTINAL TRACT N/A 11/25/2019    Procedure:  ULTRASOUND, UPPER GI TRACT, ENDOSCOPIC;  Surgeon: Alhaji Bridges MD;  Location: [facility] ENDO (2ND FLR);  Service: Endoscopy;  Laterality: N/A;  5 day hold Plavix, Dr Jovanni Serrano - pg  PM prep    ENDOSCOPIC ULTRASOUND OF UPPER GASTROINTESTINAL TRACT N/A 11/02/2020    Procedure: ULTRASOUND, UPPER GI TRACT, ENDOSCOPIC;  Surgeon: Maurilio Rodriguez MD;  Location: [facility] ENDO (2ND FLR);  Service: Endoscopy;  Laterality: N/A;  5 day hold Plavix, Dr Roman Walsh -   Covid-19 test 10/30/20 at [facility]    EPIDURAL STEROID INJECTION INTO CERVICAL SPINE N/A 12/08/2021    Procedure: Injection-steroid-epidural-cervical;  Surgeon: Socorro Lorenz MD;  [facility];  Service: Pain Management;  Laterality: N/A;    EPIDURAL STEROID INJECTION INTO CERVICAL SPINE N/A 11/30/2023    Procedure: Injection-steroid-epidural-cervical;  Surgeon: Maurilio Carroll MD;  Location: [facility] OR;  Service: Anesthesiology;  Laterality: N/A;  c7-T1    ESOPHAGOGASTRODUODENOSCOPY N/A 06/10/2021    Procedure: EGD (ESOPHAGOGASTRODUODENOSCOPY);  Surgeon: Sheree Metz MD;  Location: [facility];  Service: Endoscopy;  Laterality: N/A;    ESOPHAGOGASTRODUODENOSCOPY N/A 09/26/2024    Procedure: EGD (ESOPHAGOGASTRODUODENOSCOPY);  Surgeon: Jovanny Saldivar MD;  Location: [facility];  Service: Endoscopy;  Laterality: N/A;    ESOPHAGOSCOPY, USING BOUGIE, WITH DILATION N/A 01/03/2024    Procedure: ESOPHAGOSCOPY, USING BOUGIE, WITH DILATION;  Surgeon: Mir Belle MD;  Location: [facility] OR;  Service: ENT;  Laterality: N/A;    FRACTURE SURGERY Left 05/02/2022    ankle    HERNIA REPAIR      HYSTERECTOMY      INCISIONAL HERNIA REPAIR      INJECTION OF ANESTHETIC AGENT AROUND NERVE Right 05/27/2019    Procedure: RIGHT L5-S3 MEDIAL BRANCH BLOCKS;  Surgeon: Sneha Aragon MD;  Location[facility] OR;  Service: Pain Management;  Laterality: Right;  **DO NOT STOP PLAVIX**    INJECTION OF JOINT Right 09/22/2021    Procedure: Injection, Joint; Right SI Joint Injection;   "Surgeon: Socorro Lorenz MD;  Location: [facility] OR;  Service: Pain Management;  Laterality: Right;  Oral    INSERTION OF DORSAL COLUMN NERVE STIMULATOR FOR TRIAL N/A 06/07/2021    Procedure: INSERTION, NEUROSTIMULATOR, SPINAL CORD, DORSAL COLUMN, FOR TRIAL;  Surgeon: Socorro Lorenz MD;  Location: [facility] OR;  Service: Pain Management;  Laterality: N/A;  nevro rep confirmed start time    instestine      bowel section    KNEE ARTHROPLASTY Right 12/18/2019    Procedure: ARTHROPLASTY, KNEE;  Surgeon: Ike Briceño II, MD;  Location: [facility] OR;  Service: Orthopedics;  Laterality: Right;    KNEE SURGERY  1983    OOPHORECTOMY      PARATHYROID GLAND SURGERY      3 surgeries    RADIOFREQUENCY ABLATION Right 06/10/2019    Procedure: Radiofrequency Ablation - RIGHT L3-5 RADIOFREQUENCY ABLATION WITH [employer] COOLIEF THERMAL SYSTEM;  Surgeon: Sneha Aragon MD;  Location: [facility] OR;  Service: Pain Management;  Laterality: Right;  **HOLD PLAVIX x 7 DAYS PRIOR**    REVISION OF KNEE ARTHROPLASTY Right     AVALA with Dr Singh    ROBOTIC BRONCHOSCOPY Bilateral 2/20/2025    Procedure: ROBOTIC BRONCHOSCOPY;  Surgeon: Ismael Cooper MD;  Location: [facility] OR;  Service: Pulmonary;  Laterality: Bilateral;    SPINAL CORD STIMULATOR REMOVAL N/A 02/21/2024    Procedure: REMOVAL, NEUROSTIMULATOR, SPINAL;  Surgeon: Maurilio Carroll MD;  Location: [facility] OR;  Service: Pain Management;  Laterality: N/A;  removal of spinal cord stimulator    UPPER GASTROINTESTINAL ENDOSCOPY         Social History[1]    Cancer-related family history includes Breast cancer in her sister; Cancer in her maternal grandfather.    Medications Ordered Prior to Encounter[2]    Review of patient's allergies indicates:   Allergen Reactions    Aspirin Other (See Comments)     "Makes stomach feel like it's on fire"    Phenergan [promethazine] Other (See Comments)     SEIZURES    Nickel     Lortab [hydrocodone-acetaminophen] Itching     Able to take generic Norco       Review of Systems: "   Constistutional: Denies fever, chills. No recent weight changes. Denies nights sweats.  Eyes: No redness or dryness.  ENT: Denies dry mouth. No ulcers.  Card: Denies chest pain. No PND, or orthopnea.   Resp: Denies cough. Denies shortness of breath.  Gastro: Denies nausea or vomiting, constipation, diarrhea.  Genito: No kidney stones. Denies dysuria.  Skin: No rash, psoriasis, or alopecia.  Musculoskeletal:No myalgia. No muscle weakness.  Neuro: No numbness or tingling. No history of seizures or psychosis.  Psych: Denies depression. Denies anxiety.  Endo: Denies history of diabetes. No thyroid disease.  Heme: Denies anemia. No blood clots or bleeding diathesis.  Allergic: Denies seasonal allergies.   All other systems negative    Vital Signs: There were no vitals filed for this visit.     ECOG Performance Status: 1 - Ambulates, capable of light work    Physical Exam:  General:  Well-appearing, nontoxic  Eyes:  Equal and round pupils, EOMI, no scleral icterus  Mouth:  No lesions, moist  Cardiovascular:  Warm, well-perfused, no peripheral edema  Lungs:  Unlabored on room air, no wheezing  Neurologic:  Awake, alert and oriented, participating in the exam  Psych:  Appropriate mood and affect  Skin:  Normal pallor, No rashes  Heme:  No petechiae, no purpura       Imaging: I have personally reviewed the patient's available images and reports and summarized pertinent findings above in HPI.     Pathology: I have personally reviewed the patient's available pathology and summarized pertinent findings above in HPI.     This case was discussed with Dr. William.      Bereket Jefferson MD  Radiation Oncology       [1]   Social History  Tobacco Use    Smoking status: Former     Current packs/day: 0.00     Average packs/day: 1 pack/day for 46.0 years (46.0 ttl pk-yrs)     Types: Cigarettes     Start date: 1977     Quit date: 2023     Years since quittin.1    Smokeless tobacco: Never   Substance Use Topics    Alcohol use:  Not Currently    Drug use: Yes     Comment: medical marijuana   [2]   Current Outpatient Medications on File Prior to Visit   Medication Sig Dispense Refill    acetaminophen (TYLENOL) 325 MG tablet Take 2 tablets (650 mg total) by mouth every 6 (six) hours as needed for Pain (Do not take with any other tylenol containing products).  0    allopurinoL (ZYLOPRIM) 300 MG tablet TAKE 1 TABLET DAILY (Patient taking differently: Take 300 mg by mouth Daily.) 90 tablet 1    amLODIPine (NORVASC) 5 MG tablet TAKE 1 TABLET(5 MG) BY MOUTH TWICE DAILY 180 tablet 1    busPIRone (BUSPAR) 5 MG Tab Take 1 tablet (5 mg total) by mouth 2 (two) times daily. 180 tablet 3    calcium citrate-vitamin D3 315-200 mg (CITRACAL+D) 315 mg-5 mcg (200 unit) per tablet Take 1 tablet by mouth once daily.      clopidogreL (PLAVIX) 75 mg tablet Take 1 tablet (75 mg total) by mouth once daily. 90 tablet 3    colestipoL (COLESTID) 1 gram Tab TAKE 2 TABLETS ONCE DAILY (Patient taking differently: Take 2 g by mouth once daily.) 180 tablet 1    cyanocobalamin 1,000 mcg/mL injection INJECT 1 ML IN THE MUSCLE EVERY 14 DAYS (Patient taking differently: Inject 1,000 mcg into the muscle every 14 (fourteen) days.) 10 mL 0    EScitalopram oxalate (LEXAPRO) 20 MG tablet TAKE 1 TABLET EVERY EVENING 90 tablet 3    fluticasone propionate (FLONASE) 50 mcg/actuation nasal spray 2 sprays (100 mcg total) by Each Nostril route 2 (two) times daily. 48 g 3    furosemide (LASIX) 20 MG tablet TAKE 1 TABLET(20 MG) BY MOUTH EVERY DAY 90 tablet 3    gabapentin (NEURONTIN) 400 MG capsule Take 1 capsule (400 mg total) by mouth 2 (two) times daily. 180 capsule 3    hydrALAZINE (APRESOLINE) 25 MG tablet Take 1 tablet (25 mg total) by mouth daily as needed (Check BP after taking amlodipine, Lasix, metoprolol.  If it continues to stay greater than 160/100 take hydralazine). 30 tablet 0    levothyroxine (SYNTHROID) 75 MCG tablet TAKE 1 TABLET DAILY 90 tablet 3    magnesium oxide  (MAG-OX) 400 mg (241.3 mg magnesium) tablet Take 1 tablet (400 mg total) by mouth once daily.      methocarbamoL (ROBAXIN) 750 MG Tab Take 1 tablet (750 mg total) by mouth every 8 (eight) hours as needed (muscle spasm). 270 tablet 3    metoprolol succinate (TOPROL-XL) 50 MG 24 hr tablet Take 1 tablet (50 mg total) by mouth once daily. Pt takes 1/2 tab daily 90 tablet 2    oxyCODONE (OXY-IR) 5 mg Cap Take 5 mg by mouth every 4 (four) hours as needed for Pain.      pantoprazole (PROTONIX) 40 MG tablet TAKE 1 TABLET DAILY (Patient taking differently: Take 40 mg by mouth once daily.) 90 tablet 3    potassium chloride SA (K-DUR,KLOR-CON M) 10 MEQ tablet Take 1 tablet (10 mEq total) by mouth once daily. (Patient taking differently: Take 10 mEq by mouth nightly.) 90 tablet 3    rosuvastatin (CRESTOR) 10 MG tablet TAKE 1 TABLET DAILY (Patient taking differently: Take 10 mg by mouth once daily.) 90 tablet 3    traZODone (DESYREL) 100 MG tablet TAKE 1 TABLET EVERY EVENING (Patient taking differently: Take 100 mg by mouth every evening.) 90 tablet 3    vit A/vit C/vit E/zinc/copper (PRESERVISION AREDS ORAL) Take 1 capsule by mouth 2 (two) times a day.      vitamin D (VITAMIN D3) 1000 units Tab Take 1,000 Units by mouth 2 (two) times a day.      ZENPEP 40,000-126,000- 168,000 unit CpDR Take 1 capsule by mouth once daily.       Current Facility-Administered Medications on File Prior to Visit   Medication Dose Route Frequency Provider Last Rate Last Admin    oxyCODONE immediate release tablet 5 mg  5 mg Oral Once Paul Tobar MD

## 2025-03-17 ENCOUNTER — CLINICAL SUPPORT (OUTPATIENT)
Dept: REHABILITATION | Facility: HOSPITAL | Age: 70
End: 2025-03-17
Payer: MEDICARE

## 2025-03-17 DIAGNOSIS — Z74.09 IMPAIRED FUNCTIONAL MOBILITY AND ACTIVITY TOLERANCE: Primary | ICD-10-CM

## 2025-03-17 PROCEDURE — 97530 THERAPEUTIC ACTIVITIES: CPT | Mod: PN

## 2025-03-17 PROCEDURE — 97112 NEUROMUSCULAR REEDUCATION: CPT | Mod: PN

## 2025-03-17 NOTE — PROGRESS NOTES
"  Outpatient Rehab    Physical Therapy Visit    Patient Name: Alessia Nelson  MRN: [id number]  YOB: 1955  Encounter Date: 3/17/2025    Therapy Diagnosis:   Encounter Diagnosis   Name Primary?    Impaired functional mobility and activity tolerance Yes     Physician: Michele Singh MD    Physician Orders: Eval and Treat  Medical Diagnosis: Presence of right artificial knee joint    Visit # / Visits Authorized:  4 / 12  Date of Evaluation: 2/24/2025  Insurance Authorization Period: 2/24/2025 to 12/31/2025  Plan of Care Certification:  2/24/2035 to 5/24/2025      PT/PTA:     Number of PTA visits since last PT visit:   Time In: 1230   Time Out: 1330  Total Time: 60   Total Billable Time: 55    FOTO:  Intake Score:  %  Survey Score 1:  %  Survey Score 2:  %         Subjective   My right knee is still bothering me, I think it's my hip that is causing my knee to ache.  Change of gait pattern now that her knee has been replaced.  Had Myelogram on spine last week.  Has F/U appointment with Dr. Shay next week - would like to get this moved up if possible.  Alessia stated that the radiation oncologist and oncologist would like to get started on her treatment plan for lung CA next week as well..  Pain reported as 5/10. Right knee    Objective            Treatment:  Balance/Neuromuscular Re-Education  NMR 1: SAQ's over small gray roll - alternate LE's - 3 mins with 3#  NMR 2: Quad sets - 10" H x 2 mins - small towel roll under knee  NMR 3: Toe-Taps - 8" step while standing on Air-Ex x 2 mins  NMR 4: Achilles stretch on wedge - 30 sec x 3  NMR 6: 3-way hip - x 15 - yellow tband  Therapeutic Activity  TA 2: SLR - 10 x 2  TA 3: Sit <> Stand x 12  TA 4: S/L hip abudction x 12  TA 5: S/L clams x 15  TA 6: Step-ups: 6" step x 15 leading with each leg - forward step  TA 7: Lateral step-up with RLE 6" step x 15  TA 8: Bridges x 15  TA 9: Ball Squeezes x 2 mins  TA 10: Nu-Step x 10 mins Level 5 ( bikes were unavailable)    Time " Entry(in minutes):  Neuromuscular Re-Education Time Entry: 25  Therapeutic Activity Time Entry: 30    Assessment & Plan   Assessment: Did well with knee exercises today;  Right knee ROM is functional; no reddness, no sign of infection; just noting some achiness around the joint.  Continue with therapy and advance as patient progresses.  Evaluation/Treatment Tolerance: Patient tolerated treatment well    Patient will continue to benefit from skilled outpatient physical therapy to address the deficits listed in the problem list box on initial evaluation, provide pt/family education and to maximize pt's level of independence in the home and community environment.     Patient's spiritual, cultural, and educational needs considered and patient agreeable to plan of care and goals.           Plan: Continue with SKilled Physical Therapy to address deficits as result of TKA on right; Treatment 2x/week per POC    Goals:   Active       LTG's - 6 weeks        Able to perform all household activities with no limitation        Start:  02/24/25    Expected End:  04/11/25            Able to ambulate community required distances with no limitation        Start:  02/24/25    Expected End:  04/11/25            Able to ascend/descend a flight of steps with single railing with reciprocal pattern, no limitation        Start:  02/24/25    Expected End:  04/11/25            Improve RLE strength by 1/2 grade to improve gait/balance and function        Start:  02/24/25    Expected End:  04/11/25            Right knee AROM - 0 - 125 degrees without increased symptoms.        Start:  02/24/25    Expected End:  04/11/25               STG's - 3 weeks        Demonstrate improvement in recent symptoms to progress toward long term goals  (Progressing)       Start:  02/24/25    Expected End:  03/21/25            Correct postural deficits to reduce pain; improve postural awareness for injury prevention  (Progressing)       Start:  02/24/25     Expected End:  03/21/25            Demonstrate compliance with initial exercise program  (Progressing)       Start:  02/24/25    Expected End:  03/21/25                Trinh Reyes, PT

## 2025-03-19 ENCOUNTER — PATIENT MESSAGE (OUTPATIENT)
Dept: HEMATOLOGY/ONCOLOGY | Facility: CLINIC | Age: 70
End: 2025-03-19
Payer: MEDICARE

## 2025-03-19 ENCOUNTER — CLINICAL SUPPORT (OUTPATIENT)
Dept: REHABILITATION | Facility: HOSPITAL | Age: 70
End: 2025-03-19
Payer: MEDICARE

## 2025-03-19 DIAGNOSIS — Z74.09 IMPAIRED FUNCTIONAL MOBILITY AND ACTIVITY TOLERANCE: Primary | ICD-10-CM

## 2025-03-19 DIAGNOSIS — M54.2 CERVICAL SPINE PAIN: Primary | ICD-10-CM

## 2025-03-19 PROCEDURE — 97150 GROUP THERAPEUTIC PROCEDURES: CPT | Mod: PN

## 2025-03-19 PROCEDURE — 97112 NEUROMUSCULAR REEDUCATION: CPT | Mod: PN

## 2025-03-19 PROCEDURE — 97530 THERAPEUTIC ACTIVITIES: CPT | Mod: PN

## 2025-03-19 NOTE — PROGRESS NOTES
"  Outpatient Rehab    Physical Therapy Visit    Patient Name: Alessia Nelson  MRN: [id number]  YOB: 1955  Encounter Date: 3/19/2025    Therapy Diagnosis:   Encounter Diagnosis   Name Primary?    Impaired functional mobility and activity tolerance Yes     Physician: Michele Singh MD    Physician Orders: Eval and Treat  Medical Diagnosis: Presence of right artificial knee joint    Visit # / Visits Authorized:  5 / 12  Date of Evaluation: 2/24/25  Insurance Authorization Period: 2/24/2025 to 12/31/2025  Plan of Care Certification:  2/24/25 to 5/24/25      PT/PTA:     Number of PTA visits since last PT visit:   Time In: 1100   Time Out: 1155  Total Time: 55   Total Billable Time: 45    FOTO:  Intake Score:  %  Survey Score 1:  %  Survey Score 2:  %         Subjective   Her right knee really hurt last night. She put ice on it for a while and that helped. It's not as bad today. Rates pain as 3/10 now..  Pain reported as 3/10.      Objective            Treatment:  Therapeutic Exercise  TE 1: Seated ham curl w/ band (red) x 15  TE 2: Seated LAQ x 20; 0#  TE 3: Seated knee hang x 1 min; 0#  Balance/Neuromuscular Re-Education  NMR 1: SAQ's over tan roll - alternate LE's - 2 mins with 3#  NMR 3: Toe-Taps - 8" step while standing on Air-Ex x 2 mins  NMR 4: Achilles stretch on wedge - 30 sec x 3  NMR 6: 3-way hip - x 15 - yellow tband  NMR 7: Heel raises x 20  Therapeutic Activity  TA 1: Recumbent Bike - Level 1 x 10 mins  TA 2: SLR - 10 x 2  TA 3: Sit <> Stand x 12  TA 4: S/L hip abudction x 12  TA 5: S/L clams x 15  TA 6: Step-ups: 6" step x 15 leading with each leg - forward step  TA 7: Lateral step-up with RLE 6" step x 15  TA 8: Bridges x 15  TA 9: Ball Squeezes x 2 mins    Time Entry(in minutes):  Hot/Cold Pack Time Entry: 8  Group Therapy Time Entry: 15  Neuromuscular Re-Education Time Entry: 15  Therapeutic Activity Time Entry: 15    Assessment & Plan   Assessment: Pt had mild pain with some of the " exercises but knee was already bothering her when she came in. Knee AROM extension is tight and painful at end range. Instructed pt to do seated knee hangs for 1 min several times a day. Billed time reflects one on one treatment.       Patient will continue to benefit from skilled outpatient physical therapy to address the deficits listed in the problem list box on initial evaluation, provide pt/family education and to maximize pt's level of independence in the home and community environment.     Patient's spiritual, cultural, and educational needs considered and patient agreeable to plan of care and goals.           Plan: Continue with SKilled Physical Therapy to address deficits as result of TKA on right; Treatment 2x/week per POC    Goals:   Active       LTG's - 6 weeks        Able to perform all household activities with no limitation        Start:  02/24/25    Expected End:  04/11/25            Able to ambulate community required distances with no limitation        Start:  02/24/25    Expected End:  04/11/25            Able to ascend/descend a flight of steps with single railing with reciprocal pattern, no limitation        Start:  02/24/25    Expected End:  04/11/25            Improve RLE strength by 1/2 grade to improve gait/balance and function        Start:  02/24/25    Expected End:  04/11/25            Right knee AROM - 0 - 125 degrees without increased symptoms.        Start:  02/24/25    Expected End:  04/11/25               STG's - 3 weeks        Demonstrate improvement in recent symptoms to progress toward long term goals  (Progressing)       Start:  02/24/25    Expected End:  03/21/25            Correct postural deficits to reduce pain; improve postural awareness for injury prevention  (Progressing)       Start:  02/24/25    Expected End:  03/21/25            Demonstrate compliance with initial exercise program  (Progressing)       Start:  02/24/25    Expected End:  03/21/25                Elsa  Lico, PT

## 2025-03-21 ENCOUNTER — TELEPHONE (OUTPATIENT)
Dept: HEMATOLOGY/ONCOLOGY | Facility: CLINIC | Age: 70
End: 2025-03-21
Payer: MEDICARE

## 2025-03-21 NOTE — TELEPHONE ENCOUNTER
----- Message from [employer] sent at 3/21/2025  7:26 AM CDT -----  Type: Needs Medical AdviceWho Called:  Robbie Call Back Number: [phone] Information: Alessia states she needs to reschedule her 3/21 labs and wadge appointment , please call to further assist thank you .

## 2025-03-21 NOTE — TELEPHONE ENCOUNTER
Called and spoke to patient to schedule an in office genetic appointment she requested the [facility].

## 2025-03-26 ENCOUNTER — TELEPHONE (OUTPATIENT)
Dept: CARDIOLOGY | Facility: CLINIC | Age: 70
End: 2025-03-26
Payer: MEDICARE

## 2025-03-26 ENCOUNTER — CLINICAL SUPPORT (OUTPATIENT)
Dept: REHABILITATION | Facility: HOSPITAL | Age: 70
End: 2025-03-26
Payer: MEDICARE

## 2025-03-26 DIAGNOSIS — Z74.09 IMPAIRED FUNCTIONAL MOBILITY AND ACTIVITY TOLERANCE: Primary | ICD-10-CM

## 2025-03-26 PROCEDURE — 97530 THERAPEUTIC ACTIVITIES: CPT | Mod: PN,CQ

## 2025-03-26 PROCEDURE — 97112 NEUROMUSCULAR REEDUCATION: CPT | Mod: PN,CQ

## 2025-03-26 NOTE — PROGRESS NOTES
PATIENT: Alessia Nelson  MRN: [id number]  DATE: 3/27/2025      Diagnosis:   1. Malignant neoplasm of unspecified part of unspecified bronchus or lung    2. Spinal cord mass    3. MEN1 (multiple endocrine neoplasia)    4. Hypertension, unspecified type    5. Hypothyroidism, unspecified type    6. Pancreatic insufficiency          Chief Complaint: Malignant neoplasm of unspecified part of unspecified bronc (2 week follow up )      Oncologic History:      Oncologic History     Oncologic Treatment     Pathology           Subjective:    Interval History:  Ms. Nelson is a 69 y.o. female with CAD, CHF, COPD, depression, GERD, HTN, HLD, Seizures, h/o CVA, thyroid disease who presents for NSCLC.  Since the last clinic visit CT thoracic spine on 03/13/2025 report obtained showing stable expansile mass of the superior thoracic cord extending from T2 through T4 measuring 55 mm in length by 10 mm medial lateral and 9 mm AP.  MRI T-spine on 03/13/2025 showed expansile mass in the thoracic cord concerning for potential ependymoma versus astrocytoma.  The patient endorses continued pain on dorsal aspect of both arms.  The patient denies CP, cough, SOB, abdominal pain, nausea, vomiting, constipation, diarrhea.  The patient denies fever, chills, night sweats, weight loss, new lumps or bumps, easy bruising or bleeding.    Prior History:  The patient was being followed for a nodule in the right lower lobe and had PET-CT on 05/22/2024 which showed no avidity.  CT chest on 11/11/2024 showed enlargement of the ground-glass right lower lobe lung nodule measuring 18 x 19 mm and small bilateral soft tissue pulmonary nodules measuring 3-8 mm.  The patient underwent robotic bronchoscopy and EBUS on 02/20/2025 with pathology showing squamous cell carcinoma and biopsy of right lower lung nodule and a positive station 7 lymph node.  Stations 4R, 11R S and 11 RI were negative.  TEMPUS tissue testing showed mutations in CDKN2A, FAT1, and TP53.   PD-L1 was <1%.  The patient underwent CT myelogram and MRI of the thoracic spine 03/13/2025 for chronic upper back pain with radiation down bilateral arms.    Past Medical History:   Past Medical History:   Diagnosis Date    Acute pancreatitis     Back pain     CAD (coronary artery disease)     CHF (congestive heart failure)     COPD (chronic obstructive pulmonary disease)     Depression     Diverticulosis     Encounter for blood transfusion     GERD (gastroesophageal reflux disease)     History of blood clots     1986 AFTER BOWEL RESCETION    History of bowel resection     Hyperlipidemia     Hypertension     Peritonitis     PONV (postoperative nausea and vomiting)     Pulmonary nodule     Seizures     HAD A SEIZURE FROM PHENERGAN     Stroke     left sided weakness    Thyroid disease     TIA (transient ischemic attack)     Wears dentures     upper       Past Surgical HIstory:   Past Surgical History:   Procedure Laterality Date    ADRENAL GLAND SURGERY      APPENDECTOMY      BACK SURGERY      CAUDAL EPIDURAL STEROID INJECTION N/A 03/01/2021    Procedure: Injection-steroid-epidural-caudal;  Surgeon: Socorro Lorenz MD;  Location: [facility] OR;  Service: Pain Management;  Laterality: N/A;    CHOLECYSTECTOMY      COLONOSCOPY      COLONOSCOPY N/A 06/10/2021    Procedure: COLONOSCOPY;  Surgeon: Sheree Metz MD;  Location: [facility] ENDO;  Service: Endoscopy;  Laterality: N/A;    COLONOSCOPY N/A 03/08/2022    Procedure: COLONOSCOPY;  Surgeon: Maurilio Rodriguez MD;  Location: [facility] ENDO ([address] [place] [address]);  Service: Endoscopy;  Laterality: N/A;  Pt to perform at-home COVID test morning of procedure-DS  2/24-Blood thinner approval rec'[facility] Dr. Walsh (see letters tab 2/24/22)-DS  3/2-Instructions sent via email-DS  3/7-Pt unable to come earlier due to ride-DS    CORONARY STENT PLACEMENT      CYSTOURETHROSCOPY N/A 11/13/2019    Procedure: CYSTOURETHROSCOPY;  Surgeon: Shun Nuñez MD;  Location: [facility] OR;  Service: Urology;  Laterality:  N/A;    DIRECT DIAGNOSTIC LARYNGOSCOPY WITH BRONCHOSCOPY AND ESOPHAGOSCOPY N/A 01/03/2024    Procedure: LARYNGOSCOPY, DIRECT, DIAGNOSTIC, WITH BRONCHOSCOPY AND ESOPHAGOSCOPY;  Surgeon: Mir Belle MD;  Location: [facility] OR;  Service: ENT;  Laterality: N/A;    ENDOBRONCHIAL ULTRASOUND Bilateral 2/20/2025    Procedure: ENDOBRONCHIAL ULTRASOUND (EBUS);  Surgeon: Ismael Cooper MD;  Location: [facility] OR;  Service: Pulmonary;  Laterality: Bilateral;    ENDOSCOPIC ULTRASOUND OF LOWER GASTROINTESTINAL TRACT N/A 03/08/2022    Procedure: ULTRASOUND, LOWER GI TRACT, ENDOSCOPIC;  Surgeon: Maurilio Rodriguez MD;  Location: [facility] (2ND FLR);  Service: Endoscopy;  Laterality: N/A;  Pt to perform at-home COVID test morning of procedure-DS  2/24-Blood thinner approval rec'[facility] Dr. Walsh (see letters tab 2/24/22)-DS  3/2-Instructions sent via email-DS  3/7-Pt unable to come earlier due to ride-DS    ENDOSCOPIC ULTRASOUND OF UPPER GASTROINTESTINAL TRACT N/A 11/25/2019    Procedure: ULTRASOUND, UPPER GI TRACT, ENDOSCOPIC;  Surgeon: Alhaji Bridges MD;  Location: [facility] (2ND FLR);  Service: Endoscopy;  Laterality: N/A;  5 day hold Plavix, Dr Jovanni Serrano - pg  PM prep    ENDOSCOPIC ULTRASOUND OF UPPER GASTROINTESTINAL TRACT N/A 11/02/2020    Procedure: ULTRASOUND, UPPER GI TRACT, ENDOSCOPIC;  Surgeon: Maurilio Rodriguez MD;  Location: [facility] (2ND FLR);  Service: Endoscopy;  Laterality: N/A;  5 day hold Plavix, Dr Roman Walsh - pg  Covid-19 test 10/30/20 at [facility]    EPIDURAL STEROID INJECTION INTO CERVICAL SPINE N/A 12/08/2021    Procedure: Injection-steroid-epidural-cervical;  Surgeon: Socorro Lorenz MD;  Location: [facility] OR;  Service: Pain Management;  Laterality: N/A;    EPIDURAL STEROID INJECTION INTO CERVICAL SPINE N/A 11/30/2023    Procedure: Injection-steroid-epidural-cervical;  Surgeon: Maurilio Carroll MD;  Location: [facility] OR;  Service: Anesthesiology;  Laterality: N/A;  c7-T1    ESOPHAGOGASTRODUODENOSCOPY N/A  06/10/2021    Procedure: EGD (ESOPHAGOGASTRODUODENOSCOPY);  Surgeon: Sheree Metz MD;  Location: [facility] ENDO;  Service: Endoscopy;  Laterality: N/A;    ESOPHAGOGASTRODUODENOSCOPY N/A 09/26/2024    Procedure: EGD (ESOPHAGOGASTRODUODENOSCOPY);  Surgeon: Jovanny Saldivar MD;  Location: [facility] ENDO;  Service: Endoscopy;  Laterality: N/A;    ESOPHAGOSCOPY, USING BOUGIE, WITH DILATION N/A 01/03/2024    Procedure: ESOPHAGOSCOPY, USING BOUGIE, WITH DILATION;  Surgeon: Mir Belle MD;  Location: [facility] OR;  Service: ENT;  Laterality: N/A;    FRACTURE SURGERY Left 05/02/2022    ankle    HERNIA REPAIR      HYSTERECTOMY      INCISIONAL HERNIA REPAIR      INJECTION OF ANESTHETIC AGENT AROUND NERVE Right 05/27/2019    Procedure: RIGHT L5-S3 MEDIAL BRANCH BLOCKS;  Surgeon: Sneha Aragon MD;  Location: [facility] OR;  Service: Pain Management;  Laterality: Right;  **DO NOT STOP PLAVIX**    INJECTION OF JOINT Right 09/22/2021    Procedure: Injection, Joint; Right SI Joint Injection;  Surgeon: Socorro Lorenz MD;  Location: [facility] OR;  Service: Pain Management;  Laterality: Right;  Oral    INSERTION OF DORSAL COLUMN NERVE STIMULATOR FOR TRIAL N/A 06/07/2021    Procedure: INSERTION, NEUROSTIMULATOR, SPINAL CORD, DORSAL COLUMN, FOR TRIAL;  Surgeon: Socorro Lorenz MD;  Location: [facility] OR;  Service: Pain Management;  Laterality: N/A;  nevro rep confirmed start time    instestine      bowel section    KNEE ARTHROPLASTY Right 12/18/2019    Procedure: ARTHROPLASTY, KNEE;  Surgeon: Ike Briceño II, MD;  Location: [facility] OR;  Service: Orthopedics;  Laterality: Right;    KNEE SURGERY  1983    OOPHORECTOMY      PARATHYROID GLAND SURGERY      3 surgeries    RADIOFREQUENCY ABLATION Right 06/10/2019    Procedure: Radiofrequency Ablation - RIGHT L3-5 RADIOFREQUENCY ABLATION WITH HALYARD COOLIEF THERMAL SYSTEM;  Surgeon: Sneha Aragon MD;  Location: [facility] OR;  Service: Pain Management;  Laterality: Right;  **HOLD PLAVIX x 7 DAYS PRIOR**     "REVISION OF KNEE ARTHROPLASTY Right     AVALA with Dr Singh    ROBOTIC BRONCHOSCOPY Bilateral 2/20/2025    Procedure: ROBOTIC BRONCHOSCOPY;  Surgeon: Ismael Cooper MD;  Location: [facility] OR;  Service: Pulmonary;  Laterality: Bilateral;    SPINAL CORD STIMULATOR REMOVAL N/A 02/21/2024    Procedure: REMOVAL, NEUROSTIMULATOR, SPINAL;  Surgeon: Maurilio Carroll MD;  Location: [facility] OR;  Service: Pain Management;  Laterality: N/A;  removal of spinal cord stimulator    UPPER GASTROINTESTINAL ENDOSCOPY         Family History:   Family History   Problem Relation Name Age of Onset    Stroke Maternal Grandmother      Cancer Maternal Grandfather      Stroke Mother      Heart disease Father      Breast cancer Sister         Social History:  reports that she quit smoking about 2 years ago. Her smoking use included cigarettes. She started smoking about 48 years ago. She has a 46 pack-year smoking history. She has never used smokeless tobacco. She reports that she does not currently use alcohol. She reports current drug use.    Allergies:  Review of patient's allergies indicates:   Allergen Reactions    Aspirin Other (See Comments)     "Makes stomach feel like it's on fire"    Phenergan [promethazine] Other (See Comments)     SEIZURES    Nickel     Lortab [hydrocodone-acetaminophen] Itching     Able to take generic Norco       Medications:  Current Medications[1]    Review of Systems   Constitutional:  Negative for chills, fatigue, fever and unexpected weight change.   Respiratory:  Negative for cough and shortness of breath.    Cardiovascular:  Negative for chest pain and palpitations.   Gastrointestinal:  Negative for abdominal pain, constipation, diarrhea, nausea and vomiting.   Musculoskeletal:         Pain in bilateral dorsal arms   Skin:  Negative for rash.   Neurological:  Negative for headaches.   Hematological:  Negative for adenopathy. Does not bruise/bleed easily.       ECOG Performance Status: 2   Objective:      Vitals: " "  Vitals:    03/27/25 1020   BP: (!) 150/70   BP Location: Left arm   Patient Position: Sitting   Pulse: 63   Resp: 18   Temp: 98.2 °F (36.8 °C)   TempSrc: Temporal   SpO2: 98%   Weight: 87.8 kg (193 lb 9 oz)   Height: 5' 7" (1.702 m)       Physical Exam  Constitutional:       General: She is not in acute distress.     Appearance: She is well-developed. She is not diaphoretic.   HENT:      Head: Normocephalic and atraumatic.   Cardiovascular:      Rate and Rhythm: Normal rate and regular rhythm.      Heart sounds: Normal heart sounds. No murmur heard.     No friction rub. No gallop.   Pulmonary:      Effort: Pulmonary effort is normal. No respiratory distress.      Breath sounds: Normal breath sounds. No wheezing or rales.   Chest:      Chest wall: No tenderness.   Abdominal:      General: Bowel sounds are normal. There is no distension.      Palpations: Abdomen is soft. There is no mass.      Tenderness: There is no abdominal tenderness. There is no guarding or rebound.   Lymphadenopathy:      Cervical: No cervical adenopathy.      Upper Body:      Right upper body: No supraclavicular or axillary adenopathy.      Left upper body: No supraclavicular or axillary adenopathy.   Skin:     Findings: No erythema or rash.   Neurological:      Mental Status: She is alert and oriented to person, place, and time.   Psychiatric:         Behavior: Behavior normal.         Laboratory Data:  Lab Visit on 03/27/2025   Component Date Value Ref Range Status    Sodium 03/27/2025 142  136 - 145 mmol/L Final    Potassium 03/27/2025 4.0  3.5 - 5.1 mmol/L Final    Chloride 03/27/2025 108  95 - 110 mmol/L Final    CO2 03/27/2025 23  23 - 29 mmol/L Final    Glucose 03/27/2025 119 (H)  70 - 110 mg/dL Final    BUN 03/27/2025 18  8 - 23 mg/dL Final    Creatinine 03/27/2025 1.0  0.5 - 1.4 mg/dL Final    Calcium 03/27/2025 10.4  8.7 - 10.5 mg/dL Final    Protein Total 03/27/2025 6.8  6.0 - 8.4 gm/dL Final    Albumin 03/27/2025 3.8  3.5 - 5.2 " g/dL Final    Bilirubin Total 03/27/2025 0.2  0.1 - 1.0 mg/dL Final    For infants and newborns, interpretation of results should be based   on gestational age, weight and in agreement with clinical   observations.    Premature Infant recommended reference ranges:   0-24 hours:  <8.0 mg/dL   24-48 hours: <12.0 mg/dL   3-5 days:    <15.0 mg/dL   6-29 days:   <15.0 mg/dL    ALP 03/27/2025 102  40 - 150 unit/L Final    AST 03/27/2025 13  11 - 45 unit/L Final    ALT 03/27/2025 12  10 - 44 unit/L Final    Anion Gap 03/27/2025 11  8 - 16 mmol/L Final    eGFR 03/27/2025 >60  >60 mL/min/1.73/m2 Final    Estimated GFR calculated using the CKD-EPI creatinine (2021) equation.    WBC 03/27/2025 7.13  3.90 - 12.70 K/uL Final    RBC 03/27/2025 3.84 (L)  4.00 - 5.40 M/uL Final    HGB 03/27/2025 10.8 (L)  12.0 - 16.0 gm/dL Final    HCT 03/27/2025 34.8 (L)  37.0 - 48.5 % Final    MCV 03/27/2025 91  82 - 98 fL Final    MCH 03/27/2025 28.1  27.0 - 50.0 pg Final    MCHC 03/27/2025 31.0 (L)  32.0 - 36.0 g/dL Final    RDW 03/27/2025 16.1 (H)  11.5 - 14.5 % Final    Platelet Count 03/27/2025 213  150 - 450 K/uL Final    MPV 03/27/2025 8.9 (L)  9.2 - 12.9 fL Final    Nucleated RBC 03/27/2025 0  <=0 /100 WBC Final    Neut % 03/27/2025 63.9  38 - 73 % Final    Lymph % 03/27/2025 23.6  18 - 48 % Final    Mono % 03/27/2025 9.1  4 - 15 % Final    Eos % 03/27/2025 2.4  <=8 % Final    Basophil % 03/27/2025 0.7  <=1.9 % Final    Imm Grans % 03/27/2025 0.3  0.0 - 0.5 % Final    Neut # 03/27/2025 4.56  1.8 - 7.7 K/uL Final    Lymph # 03/27/2025 1.68  1 - 4.8 K/uL Final    Mono # 03/27/2025 0.65  0.3 - 1 K/uL Final    Eos # 03/27/2025 0.17  <=0.5 K/uL Final    Baso # 03/27/2025 0.05  <=0.2 K/uL Final    Imm Grans # 03/27/2025 0.02  0.00 - 0.04 K/uL Final    Mild elevation in immature granulocytes is non specific and can be seen in a variety of conditions including stress response, acute inflammation, trauma and pregnancy. Correlation with other  laboratory and clinical findings is essential.         Imaging:     MRI Brain 3/07/25  Gray matter distinction is preserved throughout the brain parenchyma. The ventricles are midline without mass effect. There is mild periventricular and deep white matter FLAIR and T2 hyperintensities likely reflecting changes of chronic microvascular ischemia. No intra-axial hemorrhage or restricted diffusion. No intra-axial fluid collection or mass. Bone marrow signal of the calvarium is within normal limits. Major vascular flow voids are within normal limits. The orbits globes and optic nerves are grossly unremarkable. Paranasal sinuses are unremarkable.    PET/CT 3/10/25  Quality of the study: Adequate.     There is increased FDG avidity within a posterior right upper lobe pulmonary nodule measuring 2.0 cm on transmission CT images. This shows max SUV of 3.6 on today's exam versus 1.8 on prior exam.     No other abnormal regions of FDG uptake are identified.     Physiologic uptake of the tracer is present within the brain, salivary glands, myocardium, GI and  tracts.     Incidental CT findings: N/A       Assessment:       1. Malignant neoplasm of unspecified part of unspecified bronchus or lung    2. Spinal cord mass    3. MEN1 (multiple endocrine neoplasia)    4. Hypertension, unspecified type    5. Hypothyroidism, unspecified type    6. 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  PD-L1 was <1%  -MRI brain KRISHNA on 3/07/25  -Treatment with chemo/XRT previously discussed  -Would plan on Carboplatin and Paclitaxel with XRT followed by year treatment with Durvalumab  -Pt to see neuro oncologist Dr. Staples to assess spinal cord tumor and need for management prior to starting treatment for NSCLC    Spinal Cord Tumor - seen on outside imaging CT T spien and MRI T spine  3/13/25  -Lesion seen in the cord measuring 55 mm in length by 10 mm medial lateral and 9 mm AP from T2-T4  -MRI T-spine on 03/13/2025 showed expansile mass in the thoracic cord concerning for potential ependymoma versus astrocytoma (Report in media)  -Pt to see Dr Staples    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  -Pt to see genetecist    HTN - pt on amlodipine, hydralazine, metoprolol  -BP slightly elevated  -Will monitor    Hypothyroidism - pt on levothyroxine 75mcg daily  -WIll monitor    Pancreatic Insufficiency - due to prior chronic pancreatitis   -Patient on Zenpep   -Management per GI    Route Chart for Scheduling    Med Onc Chart Routing      Follow up with physician Other. Pt needs a STAT appt with Dr Staples.  Pt needs appt with onclogy psychology.  Pt needs a return appt with me once she has seen Dr Staples.   Follow up with [facility]    Infusion scheduling note    Injection scheduling note    Labs    Imaging    Pharmacy appointment    Other referrals                       Sea William MD  [facility]  Hematology and Oncology  [facility] [address] [place] [address]   O: [phone]  F: [phone]               [1]   Current Outpatient Medications   Medication Sig Dispense Refill    acetaminophen (TYLENOL) 325 MG tablet Take 2 tablets (650 mg total) by mouth every 6 (six) hours as needed for Pain (Do not take with any other tylenol containing products).  0    allopurinoL (ZYLOPRIM) 300 MG tablet TAKE 1 TABLET DAILY 90 tablet 1    amLODIPine (NORVASC) 5 MG tablet TAKE 1 TABLET(5 MG) BY MOUTH TWICE DAILY 180 tablet 1    busPIRone (BUSPAR) 5 MG Tab Take 1 tablet (5 mg total) by mouth 2 (two) times daily. 180 tablet 3    clopidogreL (PLAVIX) 75 mg tablet Take 1 tablet (75 mg total) by mouth once daily. 90 tablet 3    colestipoL (COLESTID) 1 gram Tab TAKE 2 TABLETS ONCE DAILY 180 tablet 1    cyanocobalamin 1,000 mcg/mL injection INJECT 1 ML IN THE MUSCLE EVERY 14  DAYS 10 mL 0    EScitalopram oxalate (LEXAPRO) 20 MG tablet TAKE 1 TABLET EVERY EVENING 90 tablet 3    fluticasone propionate (FLONASE) 50 mcg/actuation nasal spray 2 sprays (100 mcg total) by Each Nostril route 2 (two) times daily. 48 g 3    furosemide (LASIX) 20 MG tablet TAKE 1 TABLET(20 MG) BY MOUTH EVERY DAY 90 tablet 3    gabapentin (NEURONTIN) 400 MG capsule Take 1 capsule (400 mg total) by mouth 2 (two) times daily. 180 capsule 3    hydrALAZINE (APRESOLINE) 25 MG tablet Take 1 tablet (25 mg total) by mouth daily as needed (Check BP after taking amlodipine, Lasix, metoprolol.  If it continues to stay greater than 160/100 take hydralazine). 30 tablet 0    levothyroxine (SYNTHROID) 75 MCG tablet TAKE 1 TABLET DAILY 90 tablet 3    magnesium oxide (MAG-OX) 400 mg (241.3 mg magnesium) tablet Take 1 tablet (400 mg total) by mouth once daily.      methocarbamoL (ROBAXIN) 750 MG Tab Take 1 tablet (750 mg total) by mouth every 8 (eight) hours as needed (muscle spasm). 270 tablet 3    metoprolol succinate (TOPROL-XL) 50 MG 24 hr tablet Take 1 tablet (50 mg total) by mouth once daily. Pt takes 1/2 tab daily 90 tablet 2    oxyCODONE-acetaminophen (PERCOCET) 5-325 mg per tablet Take 1 tablet by mouth every 8 (eight) hours as needed.      pantoprazole (PROTONIX) 40 MG tablet TAKE 1 TABLET DAILY 90 tablet 3    potassium chloride SA (K-DUR,KLOR-CON M) 10 MEQ tablet Take 1 tablet (10 mEq total) by mouth once daily. 90 tablet 3    rosuvastatin (CRESTOR) 10 MG tablet TAKE 1 TABLET DAILY 90 tablet 3    traZODone (DESYREL) 100 MG tablet TAKE 1 TABLET EVERY EVENING 90 tablet 3    vit A/vit C/vit E/zinc/copper (PRESERVISION AREDS ORAL) Take 1 capsule by mouth 2 (two) times a day.      vitamin D (VITAMIN D3) 1000 units Tab Take 1,000 Units by mouth 2 (two) times a day.      ZENPEP 40,000-126,000- 168,000 unit CpDR Take 1 capsule by mouth once daily.       No current facility-administered medications for this visit.      Facility-Administered Medications Ordered in Other Visits   Medication Dose Route Frequency Provider Last Rate Last Admin    oxyCODONE immediate release tablet 5 mg  5 mg Oral Once PRN Paul Nunez MD

## 2025-03-26 NOTE — PROGRESS NOTES
"  Outpatient Rehab    Physical Therapy Visit    Patient Name: Alessia Nelson  MRN: [id number]  YOB: 1955  Encounter Date: 3/26/2025    Therapy Diagnosis:   Encounter Diagnosis   Name Primary?    Impaired functional mobility and activity tolerance Yes     Physician: Michele Singh MD    Physician Orders: Eval and Treat  Medical Diagnosis: Presence of right artificial knee joint    Visit # / Visits Authorized:  6 / 12  Insurance Authorization Period: 2/24/2025 to 12/31/2025  Date of Evaluation: 2/24/2025  Plan of Care Certification: 2/24/2025 to 5/24/2025     PT/PTA:     Number of PTA visits since last PT visit:  1  Time In:   11:00 AM  Time Out:   11:50 AM  Total Time:    50 Minutes  Total Billable Time:    45 Minutes    FOTO:  Intake Score:  %  Survey Score 1:  %  Survey Score 2:  %         Subjective   As long as I keep ice on my knee, it is ok.  Pain reported as 4/10. Right knee    Objective            Treatment:  Therapeutic Exercise  TE 1: Seated ham curl w/ band (red) x 15  TE 2: Seated LAQ x 20; 0#  TE 3: Seated knee hang x 1 min; 0#  Balance/Neuromuscular Re-Education  NMR 1: SAQ's over tan roll - alternate LE's - 2 mins with 3#  NMR 2: Quad sets - 10" H x 2 mins - small towel roll under knee  NMR 3: Toe-Taps - 8" step while standing on Air-Ex x 2 mins  NMR 4: Achilles stretch on wedge - 30 sec x 3  NMR 5: Squat x 10 cueing for improved hip hinging  NMR 6: 3-way hip - x 15 - yellow tband  NMR 7: Heel raises x 20  Therapeutic Activity  TA 1: Recumbent Bike - Level 2 x 15 mins  TA 2: SLR - 10 x 2  TA 3: Sit <> Stand x 12  TA 4: S/L hip abudction x 12  TA 5: S/L clams x 15  TA 6: Step-ups: 6" step x 15 leading with each leg - forward step  TA 7: Lateral step-up with RLE 6" step x 15  TA 8: Bridges x 15  TA 9: Ball Squeezes x 2 mins    Time Entry(in minutes):  Neuromuscular Re-Education Time Entry: 15  Therapeutic Activity Time Entry: 30    Assessment & Plan   Assessment: Patient did pretty well with " exercises; right knee pain noted with some exercises; rest breaks needed during session       Patient will continue to benefit from skilled outpatient physical therapy to address the deficits listed in the problem list box on initial evaluation, provide pt/family education and to maximize pt's level of independence in the home and community environment.     Patient's spiritual, cultural, and educational needs considered and patient agreeable to plan of care and goals.           Plan: Continue with Skilled Physical Therapy to address deficits as result of TKA on right; Treatment 2x/week per POC    Goals:   Active       LTG's - 6 weeks        Able to perform all household activities with no limitation        Start:  02/24/25    Expected End:  04/11/25            Able to ambulate community required distances with no limitation        Start:  02/24/25    Expected End:  04/11/25            Able to ascend/descend a flight of steps with single railing with reciprocal pattern, no limitation        Start:  02/24/25    Expected End:  04/11/25            Improve RLE strength by 1/2 grade to improve gait/balance and function        Start:  02/24/25    Expected End:  04/11/25            Right knee AROM - 0 - 125 degrees without increased symptoms.        Start:  02/24/25    Expected End:  04/11/25               STG's - 3 weeks        Demonstrate improvement in recent symptoms to progress toward long term goals  (Progressing)       Start:  02/24/25    Expected End:  03/21/25            Correct postural deficits to reduce pain; improve postural awareness for injury prevention  (Progressing)       Start:  02/24/25    Expected End:  03/21/25            Demonstrate compliance with initial exercise program  (Progressing)       Start:  02/24/25    Expected End:  03/21/25                Jonathan Favre, PTA

## 2025-03-26 NOTE — TELEPHONE ENCOUNTER
Clearance request for PAC with dr montanez at [facility] with general [facility]    Fax #[phone]

## 2025-03-26 NOTE — LETTER
2025    Alessia Nelson  [address] [place] [address]       [facility] [place]  [facility] [facility] [address]  [facility] [address]  Phone: [phone]  Fax: [phone] Patient: Alessia Nelson  : 1955  Referring Doctor: Dr Jarrett  Type of Procedure: PAC    Current Outpatient Medications   Medication Sig    acetaminophen (TYLENOL) 325 MG tablet Take 2 tablets (650 mg total) by mouth every 6 (six) hours as needed for Pain (Do not take with any other tylenol containing products).    allopurinoL (ZYLOPRIM) 300 MG tablet TAKE 1 TABLET DAILY    amLODIPine (NORVASC) 5 MG tablet TAKE 1 TABLET(5 MG) BY MOUTH TWICE DAILY    busPIRone (BUSPAR) 5 MG Tab Take 1 tablet (5 mg total) by mouth 2 (two) times daily.    clopidogreL (PLAVIX) 75 mg tablet Take 1 tablet (75 mg total) by mouth once daily.    colestipoL (COLESTID) 1 gram Tab TAKE 2 TABLETS ONCE DAILY    cyanocobalamin 1,000 mcg/mL injection INJECT 1 ML IN THE MUSCLE EVERY 14 DAYS    EScitalopram oxalate (LEXAPRO) 20 MG tablet TAKE 1 TABLET EVERY EVENING    fluticasone propionate (FLONASE) 50 mcg/actuation nasal spray 2 sprays (100 mcg total) by Each Nostril route 2 (two) times daily.    furosemide (LASIX) 20 MG tablet TAKE 1 TABLET(20 MG) BY MOUTH EVERY DAY    gabapentin (NEURONTIN) 400 MG capsule Take 1 capsule (400 mg total) by mouth 2 (two) times daily.    hydrALAZINE (APRESOLINE) 25 MG tablet Take 1 tablet (25 mg total) by mouth daily as needed (Check BP after taking amlodipine, Lasix, metoprolol.  If it continues to stay greater than 160/100 take hydralazine).    levothyroxine (SYNTHROID) 75 MCG tablet TAKE 1 TABLET DAILY    magnesium oxide (MAG-OX) 400 mg (241.3 mg magnesium) tablet Take 1 tablet (400 mg total) by mouth once daily.    methocarbamoL (ROBAXIN) 750 MG Tab Take 1 tablet (750 mg total) by mouth every 8 (eight) hours as needed (muscle spasm).    metoprolol succinate  (TOPROL-XL) 50 MG 24 hr tablet Take 1 tablet (50 mg total) by mouth once daily. Pt takes 1/2 tab daily    oxyCODONE-acetaminophen (PERCOCET) 5-325 mg per tablet Take 1 tablet by mouth every 8 (eight) hours as needed.    pantoprazole (PROTONIX) 40 MG tablet TAKE 1 TABLET DAILY    potassium chloride SA (K-DUR,KLOR-CON M) 10 MEQ tablet Take 1 tablet (10 mEq total) by mouth once daily.    rosuvastatin (CRESTOR) 10 MG tablet TAKE 1 TABLET DAILY    traZODone (DESYREL) 100 MG tablet TAKE 1 TABLET EVERY EVENING    vit A/vit C/vit E/zinc/copper (PRESERVISION AREDS ORAL) Take 1 capsule by mouth 2 (two) times a day.    vitamin D (VITAMIN D3) 1000 units Tab Take 1,000 Units by mouth 2 (two) times a day.    ZENPEP 40,000-126,000- 168,000 unit CpDR Take 1 capsule by mouth once daily.     No current facility-administered medications for this visit.     Facility-Administered Medications Ordered in Other Visits   Medication    oxyCODONE immediate release tablet 5 mg     Ok to hold Plavix 7 days prior to surgery/procedure.    If you have any questions regarding the above, please contact my office at [phone].    Sincerely,

## 2025-03-27 ENCOUNTER — LAB VISIT (OUTPATIENT)
Dept: LAB | Facility: HOSPITAL | Age: 70
End: 2025-03-27
Attending: INTERNAL MEDICINE
Payer: MEDICARE

## 2025-03-27 ENCOUNTER — OFFICE VISIT (OUTPATIENT)
Dept: HEMATOLOGY/ONCOLOGY | Facility: CLINIC | Age: 70
End: 2025-03-27
Payer: MEDICARE

## 2025-03-27 VITALS
HEART RATE: 63 BPM | SYSTOLIC BLOOD PRESSURE: 150 MMHG | HEIGHT: 67 IN | OXYGEN SATURATION: 98 % | BODY MASS INDEX: 30.38 KG/M2 | DIASTOLIC BLOOD PRESSURE: 70 MMHG | WEIGHT: 193.56 LBS | TEMPERATURE: 98 F | RESPIRATION RATE: 18 BRPM

## 2025-03-27 DIAGNOSIS — G95.89 SPINAL CORD MASS: ICD-10-CM

## 2025-03-27 DIAGNOSIS — C34.90 MALIGNANT NEOPLASM OF UNSPECIFIED PART OF UNSPECIFIED BRONCHUS OR LUNG: Primary | ICD-10-CM

## 2025-03-27 DIAGNOSIS — E31.21 MEN1 (MULTIPLE ENDOCRINE NEOPLASIA): ICD-10-CM

## 2025-03-27 DIAGNOSIS — E03.9 HYPOTHYROIDISM, UNSPECIFIED TYPE: ICD-10-CM

## 2025-03-27 DIAGNOSIS — I10 HYPERTENSION, UNSPECIFIED TYPE: ICD-10-CM

## 2025-03-27 DIAGNOSIS — C34.90 MALIGNANT NEOPLASM OF UNSPECIFIED PART OF UNSPECIFIED BRONCHUS OR LUNG: ICD-10-CM

## 2025-03-27 DIAGNOSIS — K86.89 PANCREATIC INSUFFICIENCY: ICD-10-CM

## 2025-03-27 LAB
ABSOLUTE EOSINOPHIL (OHS): 0.17 K/UL
ABSOLUTE MONOCYTE (OHS): 0.65 K/UL (ref 0.3–1)
ABSOLUTE NEUTROPHIL COUNT (OHS): 4.56 K/UL (ref 1.8–7.7)
ALBUMIN SERPL BCP-MCNC: 3.8 G/DL (ref 3.5–5.2)
ALP SERPL-CCNC: 102 UNIT/L (ref 40–150)
ALT SERPL W/O P-5'-P-CCNC: 12 UNIT/L (ref 10–44)
ANION GAP (OHS): 11 MMOL/L (ref 8–16)
AST SERPL-CCNC: 13 UNIT/L (ref 11–45)
BASOPHILS # BLD AUTO: 0.05 K/UL
BASOPHILS NFR BLD AUTO: 0.7 %
BILIRUB SERPL-MCNC: 0.2 MG/DL (ref 0.1–1)
BUN SERPL-MCNC: 18 MG/DL (ref 8–23)
CALCIUM SERPL-MCNC: 10.4 MG/DL (ref 8.7–10.5)
CHLORIDE SERPL-SCNC: 108 MMOL/L (ref 95–110)
CO2 SERPL-SCNC: 23 MMOL/L (ref 23–29)
CREAT SERPL-MCNC: 1 MG/DL (ref 0.5–1.4)
ERYTHROCYTE [DISTWIDTH] IN BLOOD BY AUTOMATED COUNT: 16.1 % (ref 11.5–14.5)
GFR SERPLBLD CREATININE-BSD FMLA CKD-EPI: >60 ML/MIN/1.73/M2
GLUCOSE SERPL-MCNC: 119 MG/DL (ref 70–110)
HCT VFR BLD AUTO: 34.8 % (ref 37–48.5)
HGB BLD-MCNC: 10.8 GM/DL (ref 12–16)
IMM GRANULOCYTES # BLD AUTO: 0.02 K/UL (ref 0–0.04)
IMM GRANULOCYTES NFR BLD AUTO: 0.3 % (ref 0–0.5)
LYMPHOCYTES # BLD AUTO: 1.68 K/UL (ref 1–4.8)
MCH RBC QN AUTO: 28.1 PG (ref 27–50)
MCHC RBC AUTO-ENTMCNC: 31 G/DL (ref 32–36)
MCV RBC AUTO: 91 FL (ref 82–98)
NUCLEATED RBC (/100WBC) (OHS): 0 /100 WBC
PLATELET # BLD AUTO: 213 K/UL (ref 150–450)
PMV BLD AUTO: 8.9 FL (ref 9.2–12.9)
POTASSIUM SERPL-SCNC: 4 MMOL/L (ref 3.5–5.1)
PROT SERPL-MCNC: 6.8 GM/DL (ref 6–8.4)
RBC # BLD AUTO: 3.84 M/UL (ref 4–5.4)
RELATIVE EOSINOPHIL (OHS): 2.4 %
RELATIVE LYMPHOCYTE (OHS): 23.6 % (ref 18–48)
RELATIVE MONOCYTE (OHS): 9.1 % (ref 4–15)
RELATIVE NEUTROPHIL (OHS): 63.9 % (ref 38–73)
SODIUM SERPL-SCNC: 142 MMOL/L (ref 136–145)
WBC # BLD AUTO: 7.13 K/UL (ref 3.9–12.7)

## 2025-03-27 PROCEDURE — 85025 COMPLETE CBC W/AUTO DIFF WBC: CPT | Mod: PN

## 2025-03-27 PROCEDURE — 99999 PR PBB SHADOW E&M-EST. PATIENT-LVL V: CPT | Mod: PBBFAC,,, | Performed by: INTERNAL MEDICINE

## 2025-03-27 PROCEDURE — 84132 ASSAY OF SERUM POTASSIUM: CPT | Mod: PN

## 2025-03-27 PROCEDURE — 36415 COLL VENOUS BLD VENIPUNCTURE: CPT | Mod: PN

## 2025-03-27 PROCEDURE — 99215 OFFICE O/P EST HI 40 MIN: CPT | Mod: PBBFAC,PN | Performed by: INTERNAL MEDICINE

## 2025-03-27 PROCEDURE — 99214 OFFICE O/P EST MOD 30 MIN: CPT | Mod: S$PBB,,, | Performed by: INTERNAL MEDICINE

## 2025-03-28 ENCOUNTER — CLINICAL SUPPORT (OUTPATIENT)
Dept: REHABILITATION | Facility: HOSPITAL | Age: 70
End: 2025-03-28
Payer: MEDICARE

## 2025-03-28 DIAGNOSIS — Z74.09 IMPAIRED FUNCTIONAL MOBILITY AND ACTIVITY TOLERANCE: Primary | ICD-10-CM

## 2025-03-28 PROCEDURE — 97112 NEUROMUSCULAR REEDUCATION: CPT | Mod: PN

## 2025-03-28 PROCEDURE — 97140 MANUAL THERAPY 1/> REGIONS: CPT | Mod: PN

## 2025-03-28 PROCEDURE — 97530 THERAPEUTIC ACTIVITIES: CPT | Mod: PN

## 2025-03-30 NOTE — PROGRESS NOTES
Outpatient Rehab    Physical Therapy Visit    Patient Name: Alessia Nelson  MRN: [id number]  YOB: 1955  Encounter Date: 3/28/2025    Therapy Diagnosis:   Encounter Diagnosis   Name Primary?    Impaired functional mobility and activity tolerance Yes     Physician: Michele Singh MD    Physician Orders: Eval and Treat  Medical Diagnosis: Presence of right artificial knee joint    Visit # / Visits Authorized:  7 / 12  Insurance Authorization Period: 2/24/2025 to 12/31/2025  Date of Evaluation: 2/24/2025  Plan of Care Certification: 2/24/2025 to 5/24/2025     PT/PTA:     Number of PTA visits since last PT visit:   Time In: 1000   Time Out: 1100  Total Time: 60   Total Billable Time: 60    FOTO:  Intake Score:  %  Survey Score 1:  %  Survey Score 2:  %         Subjective   I'm just concerned about my knee - it hurts on the outside, still swollen; Right after surgery it was feeling so much better, and now it just aches all of the time.  Alessia has several appoiontments next week - neuro-oncologist next week to discuss results of Myelogram for thoracic mass. Patient wanting to know when we are going to start her therapy for her back ( [facility] Shay)  Explained to patient that we need to finish up with her knee rehab and I feel that we need to wait until we have better understanding of what the thoracic mass is before we start.  Patient voiced understanding of this..  Pain reported as 3/10. RIght lateral knee    Objective            Treatment:  Therapeutic Exercise  TE 1: Seated ham curl w/ band (green ) x 15  TE 2: Seated LAQ x 20; 0#  Manual Therapy  MT 1: IASTM to distal quad, ITB to address myofascial tissue restrictions; mobllization of distal aspect of incision to free up tissue; patellar mobilization in all planes;  Patient continues to demosntrate good AROM in her knee - flexion to 121; extension -1 in spite of her pain; Trial of K-tape to right knee - lift-off piece applied infra-patellar; I-strips  "medial/lateral - extending from mid quad to lower leg for facilitation of mm action. Instructed patient in care of tape.  Balance/Neuromuscular Re-Education  NMR 1: SAQ's over tan roll - alternate LE's - 2 mins with 3#  NMR 2: Quad sets - 10" H x 2 mins - small towel roll under knee  NMR 3: Toe-Taps - 8" step while standing on Air-Ex x 2 mins  NMR 4: Achilles stretch on wedge - 30 sec x 3  NMR 5: Squat x 10 cueing for improved hip hinging  NMR 6: 3-way hip - x 15 - yellow tband  NMR 7: Heel raises x 20  Therapeutic Activity  TA 1: Recumbent Bike - Level 2 x 15 mins  TA 2: SLR - 10 x 2  TA 3: Sit <> Stand x 12  TA 4: S/L hip abudction x 12  TA 5: S/L clams x 15  TA 6: Step-ups: 6" step x 15 leading with each leg - forward step  TA 7: Lateral step-up with RLE 6" step x 15  TA 8: Bridges x 15  TA 9: Ball Squeezes x 2 mins    Time Entry(in minutes):  Manual Therapy Time Entry: 10  Neuromuscular Re-Education Time Entry: 20  Therapeutic Activity Time Entry: 30    Assessment & Plan   Assessment: Continues to note right knee pain - more on lateral aspect; Some improvement following manual tx and k-tape; Rom continues to be functional; Able to perform all exercises above without exacerbation of symptoms; Meeting with neuro-oncologist next week to discuss thoracic mass - patient does not want to start tx for lung CA until she knows what is going on with her spine.  Will continue with PT for RIght TKA as able with patient's other scheduled appointments.  Evaluation/Treatment Tolerance: Patient tolerated treatment well    Patient will continue to benefit from skilled outpatient physical therapy to address the deficits listed in the problem list box on initial evaluation, provide pt/family education and to maximize pt's level of independence in the home and community environment.     Patient's spiritual, cultural, and educational needs considered and patient agreeable to plan of care and goals.           Plan: Continue with " Skilled Physical Therapy to address deficits as result of TKA on right; Treatment 2x/week per POC; May need to work around multiple medical appointments for oncology.    Goals:   Active       LTG's - 6 weeks        Able to perform all household activities with no limitation  (Progressing)       Start:  02/24/25    Expected End:  04/11/25            Able to ambulate community required distances with no limitation  (Progressing)       Start:  02/24/25    Expected End:  04/11/25            Able to ascend/descend a flight of steps with single railing with reciprocal pattern, no limitation  (Progressing)       Start:  02/24/25    Expected End:  04/11/25            Improve RLE strength by 1/2 grade to improve gait/balance and function  (Progressing)       Start:  02/24/25    Expected End:  04/11/25            Right knee AROM - 0 - 125 degrees without increased symptoms.  (Progressing)       Start:  02/24/25    Expected End:  04/11/25               STG's - 3 weeks        Demonstrate improvement in recent symptoms to progress toward long term goals  (Met)       Start:  02/24/25    Expected End:  03/21/25    Resolved:  03/30/25         Correct postural deficits to reduce pain; improve postural awareness for injury prevention  (Progressing)       Start:  02/24/25    Expected End:  03/21/25            Demonstrate compliance with initial exercise program  (Met)       Start:  02/24/25    Expected End:  03/21/25    Resolved:  03/30/25             Trinh Reyes, PT

## 2025-04-02 ENCOUNTER — CLINICAL SUPPORT (OUTPATIENT)
Dept: REHABILITATION | Facility: HOSPITAL | Age: 70
End: 2025-04-02
Payer: MEDICARE

## 2025-04-02 ENCOUNTER — TELEPHONE (OUTPATIENT)
Facility: CLINIC | Age: 70
End: 2025-04-02
Payer: MEDICARE

## 2025-04-02 DIAGNOSIS — R68.89 ACTIVITY INTOLERANCE: ICD-10-CM

## 2025-04-02 DIAGNOSIS — Z74.09 IMPAIRED FUNCTIONAL MOBILITY AND ACTIVITY TOLERANCE: Primary | ICD-10-CM

## 2025-04-02 PROCEDURE — 97112 NEUROMUSCULAR REEDUCATION: CPT | Mod: PN

## 2025-04-02 PROCEDURE — 97530 THERAPEUTIC ACTIVITIES: CPT | Mod: PN

## 2025-04-02 PROCEDURE — 97140 MANUAL THERAPY 1/> REGIONS: CPT | Mod: PN

## 2025-04-02 NOTE — PATIENT INSTRUCTIONS
KT Tape: Full Knee Taping  Athletic Tape for Knee Pain Relief   Good instructional video for taping the knee. I usually start in the middle of the tape with 50% stretch following the border of the knee cap and no stretch at the ends.   RockTape uncut or pre-cut if you want

## 2025-04-02 NOTE — TELEPHONE ENCOUNTER
Called patient to reschedule follow up appointment until after imaging and notes are uploaded to patient's chart.

## 2025-04-02 NOTE — PROGRESS NOTES
"  Outpatient Rehab    Physical Therapy Visit    Patient Name: Alessia Nelson  MRN: [id number]  YOB: 1955  Encounter Date: 4/2/2025    Therapy Diagnosis:   Encounter Diagnoses   Name Primary?    Impaired functional mobility and activity tolerance [Z74.09] Yes    Activity intolerance [R68.89]      Physician: Michele Singh MD    Physician Orders: Eval and Treat  Medical Diagnosis: Presence of right artificial knee joint    Visit # / Visits Authorized:  8 / 12  Insurance Authorization Period: 2/24/2025 to 12/31/2025       PT/PTA:     Number of PTA visits since last PT visit:   Time In: 1100   Time Out: 1153  Total Time: 53   Total Billable Time: 53    FOTO:  Intake Score:  %  Survey Score 1:  %  Survey Score 2:  %         Subjective   says the tape helped a lot. It stayed on several days and she could really feel the difference with it, especially at the lateral side..  Pain reported as 3/10. R knee    Objective            Treatment:  Manual Therapy  MT 1: IASTM to distal quad, ITB to address myofascial tissue restrictions; mobllization of distal aspect of incision to free up tissue; patellar mobilization in all planes;  Patient continues to demosntrate good AROM in her knee - flexion to 121; extension -1 in spite of her pain; K-tape to right knee - lift-off piece applied infra-patellar; I-strips medial/lateral - extending from mid quad to lower leg for facilitation of mm action. Instructed patient in care of tape.  Balance/Neuromuscular Re-Education  NMR 1: SAQ's over tan roll - alternate LE's - 2 mins with 3#  NMR 2: Quad sets - 10" H x 2 mins - small towel roll under knee  NMR 3: Toe-Taps - 8" step while standing on Air-Ex x 2 mins  NMR 5: Squat x 10 cueing for improved hip hinging  NMR 6: 3-way hip - x 15 - yellow tband  NMR 7: Heel raises x 30  Therapeutic Activity  TA 1: Recumbent Bike - Level 2 x 15 mins  TA 6: Step-ups: 6" step x 15 leading with each leg - forward step  TA 7: Lateral step-up with " "RLE 6" step x 15  TA 10: Rec bike x 10 min    Time Entry(in minutes):  Manual Therapy Time Entry: 12  Neuromuscular Re-Education Time Entry: 23  Therapeutic Activity Time Entry: 18    Assessment & Plan   Assessment: Patient continues to be limited by right knee pain. She was able to complete NMR and TA following tape application. Extra time spent on instruction on tape application today so patient may apply at home.       Patient will continue to benefit from skilled outpatient physical therapy to address the deficits listed in the problem list box on initial evaluation, provide pt/family education and to maximize pt's level of independence in the home and community environment.     Patient's spiritual, cultural, and educational needs considered and patient agreeable to plan of care and goals.     Education  Education was done with Patient. The patient's learning style includes Listening, Demonstration, and Pictures/video. The patient Demonstrates understanding and Verbalizes understanding.         Patient educated on use of kinesiotape, including tape wear, when to remove, how to remove, signs of skin irritation. Patient instructed to remove the tape if skin irritation occurs.  See patient instructions for video on tape application       Plan: Continue to advance patient as tolerated per POC for progress toward LTGs    Goals:   Active       LTG's - 6 weeks        Able to perform all household activities with no limitation  (Progressing)       Start:  02/24/25    Expected End:  04/11/25            Able to ambulate community required distances with no limitation  (Progressing)       Start:  02/24/25    Expected End:  04/11/25            Able to ascend/descend a flight of steps with single railing with reciprocal pattern, no limitation  (Progressing)       Start:  02/24/25    Expected End:  04/11/25            Improve RLE strength by 1/2 grade to improve gait/balance and function  (Progressing)       Start:  02/24/25  "   Expected End:  04/11/25            Right knee AROM - 0 - 125 degrees without increased symptoms.  (Progressing)       Start:  02/24/25    Expected End:  04/11/25               STG's - 3 weeks        Demonstrate improvement in recent symptoms to progress toward long term goals  (Met)       Start:  02/24/25    Expected End:  03/21/25    Resolved:  03/30/25         Correct postural deficits to reduce pain; improve postural awareness for injury prevention  (Progressing)       Start:  02/24/25    Expected End:  03/21/25            Demonstrate compliance with initial exercise program  (Met)       Start:  02/24/25    Expected End:  03/21/25    Resolved:  03/30/25             Amanda Perez, PT

## 2025-04-03 ENCOUNTER — TELEPHONE (OUTPATIENT)
Dept: RADIATION ONCOLOGY | Facility: CLINIC | Age: 70
End: 2025-04-03
Payer: MEDICARE

## 2025-04-03 ENCOUNTER — DOCUMENTATION ONLY (OUTPATIENT)
Dept: RADIATION ONCOLOGY | Facility: CLINIC | Age: 70
End: 2025-04-03
Payer: MEDICARE

## 2025-04-03 DIAGNOSIS — D49.2 THORACIC SPINE TUMOR: Primary | ICD-10-CM

## 2025-04-04 ENCOUNTER — TELEPHONE (OUTPATIENT)
Dept: HEMATOLOGY/ONCOLOGY | Facility: CLINIC | Age: 70
End: 2025-04-04
Payer: MEDICARE

## 2025-04-04 NOTE — TELEPHONE ENCOUNTER
Secure chat from Nirmal Catalan with Dr Staples advised they could not see images uploaded in chart from previous MRI and will need to schedule new ones and reschedule patient. Appt with Dr William on 4/7 and will keep an eye on when she sees Dr Staples to reschedule.

## 2025-04-04 NOTE — TELEPHONE ENCOUNTER
----- Message from LORRI Thao sent at 4/3/2025  1:29 PM CDT -----  Regarding: stat consult  Good Afternoon, I had a reminder to check in on any needs or plan from Dr. Staples's visit today. I see the consult was canceled yesterday though with a note saying records were not in the chart.  What records are needed prior to Dr. Staples seeing her?  The MRI that was done at [facility] was previously shared over, and the report was previously scanned into media.No one reached out to us yesterday regarding any additional records before canceling today's consult, but patient needs a stat consult so please let us know what is needed. Thanks in advance,Keesha FRIEDMANN, RN, OCNOncology Nurse NavigatorSt. [facility]Medica [facility], [facility] [place] [facility](P) [phone] (F) [phone]

## 2025-04-07 ENCOUNTER — CLINICAL SUPPORT (OUTPATIENT)
Dept: REHABILITATION | Facility: HOSPITAL | Age: 70
End: 2025-04-07
Payer: MEDICARE

## 2025-04-07 DIAGNOSIS — Z74.09 IMPAIRED FUNCTIONAL MOBILITY AND ACTIVITY TOLERANCE: Primary | ICD-10-CM

## 2025-04-07 PROCEDURE — 97150 GROUP THERAPEUTIC PROCEDURES: CPT | Mod: PN,CQ

## 2025-04-07 PROCEDURE — 97112 NEUROMUSCULAR REEDUCATION: CPT | Mod: PN,CQ

## 2025-04-07 PROCEDURE — 97530 THERAPEUTIC ACTIVITIES: CPT | Mod: PN,CQ

## 2025-04-07 NOTE — PROGRESS NOTES
"  Outpatient Rehab    Physical Therapy Visit    Patient Name: Alessia Nelson  MRN: [id number]  YOB: 1955  Encounter Date: 4/7/2025    Therapy Diagnosis:   Encounter Diagnosis   Name Primary?    Impaired functional mobility and activity tolerance Yes     Physician: Michele Singh MD    Physician Orders: Eval and Treat  Medical Diagnosis: Presence of right artificial knee joint    Visit # / Visits Authorized:  9 / 12  Insurance Authorization Period: 2/24/2025 to 12/31/2025  Date of Evaluation: 2/24/2025  Plan of Care Certification: 2/24/2025 to 5/24/2025     PT/PTA:     Number of PTA visits since last PT visit:  1  Time In:   11:00 AM  Time Out:   11:55 AM  Total Time:   55 Minutes  Total Billable Time:   45 Minutes split billing    FOTO:  Intake Score:  %  Survey Score 1:  %  Survey Score 2:  %         Subjective   No new c/o's..  Pain reported as 2/10. Right Knee    Objective            Treatment:  Therapeutic Exercise  TE 1: Seated ham curl w/ band (green ) x 15  TE 2: Seated LAQ x 20; 0#  TE 3: Seated knee hang x 1 min; 0#  Balance/Neuromuscular Re-Education  NMR 1: SAQ's over tan roll - alternate LE's - 2 mins with 3#  NMR 2: Quad sets - 10" H x 2 mins - small towel roll under knee  NMR 3: Toe-Taps - 8" step while standing on Air-Ex x 2 mins  NMR 4: Achilles stretch on wedge - 30 sec x 3  NMR 5: Squat x 10 cueing for improved hip hinging  NMR 6: 3-way hip - x 15 - red tband  NMR 7: Heel raises x 30  Therapeutic Activity  TA 1: Recumbent Bike - Level 2 x 15 mins  TA 2: SLR - 10 x 2  TA 3: Sit <> Stand x 10  TA 4: S/L hip abudction x 10  TA 5: S/L clams x 15  TA 6: Step-ups: 6" step x 15 leading with each leg - forward step  TA 7: Lateral step-up with RLE 6" step x 15  TA 8: Bridges x 15  TA 9: Ball Squeezes x 2 mins    Time Entry(in minutes):  Group Therapy Time Entry: 15  Neuromuscular Re-Education Time Entry: 15  Therapeutic Activity Time Entry: 30  Therapeutic Exercise Time Entry: 10    Assessment " & Plan   Assessment: Patient did well with exercises; no exacerbations; KT tape seems to be helping       Patient will continue to benefit from skilled outpatient physical therapy to address the deficits listed in the problem list box on initial evaluation, provide pt/family education and to maximize pt's level of independence in the home and community environment.     Patient's spiritual, cultural, and educational needs considered and patient agreeable to plan of care and goals.           Plan: Continue to advance patient as tolerated per POC for progress toward LTGs    Goals:   Active       LTG's - 6 weeks        Able to perform all household activities with no limitation  (Progressing)       Start:  02/24/25    Expected End:  04/11/25            Able to ambulate community required distances with no limitation  (Progressing)       Start:  02/24/25    Expected End:  04/11/25            Able to ascend/descend a flight of steps with single railing with reciprocal pattern, no limitation  (Progressing)       Start:  02/24/25    Expected End:  04/11/25            Improve RLE strength by 1/2 grade to improve gait/balance and function  (Progressing)       Start:  02/24/25    Expected End:  04/11/25            Right knee AROM - 0 - 125 degrees without increased symptoms.  (Progressing)       Start:  02/24/25    Expected End:  04/11/25               STG's - 3 weeks        Demonstrate improvement in recent symptoms to progress toward long term goals  (Met)       Start:  02/24/25    Expected End:  03/21/25    Resolved:  03/30/25         Correct postural deficits to reduce pain; improve postural awareness for injury prevention  (Progressing)       Start:  02/24/25    Expected End:  03/21/25            Demonstrate compliance with initial exercise program  (Met)       Start:  02/24/25    Expected End:  03/21/25    Resolved:  03/30/25             Jonathan Favre, PTA

## 2025-04-08 ENCOUNTER — PATIENT MESSAGE (OUTPATIENT)
Dept: HEMATOLOGY/ONCOLOGY | Facility: CLINIC | Age: 70
End: 2025-04-08
Payer: MEDICARE

## 2025-04-11 ENCOUNTER — HOSPITAL ENCOUNTER (OUTPATIENT)
Dept: RADIOLOGY | Facility: HOSPITAL | Age: 70
Discharge: HOME | End: 2025-04-11
Attending: PSYCHIATRY & NEUROLOGY
Payer: MEDICARE

## 2025-04-11 DIAGNOSIS — D49.2 THORACIC SPINE TUMOR: ICD-10-CM

## 2025-04-11 PROCEDURE — A9585 GADOBUTROL INJECTION: HCPCS | Performed by: PSYCHIATRY & NEUROLOGY

## 2025-04-11 PROCEDURE — 25500020 PHARM REV CODE 255: Performed by: PSYCHIATRY & NEUROLOGY

## 2025-04-11 PROCEDURE — 72157 MRI CHEST SPINE W/O & W/DYE: CPT | Mod: 26,,, | Performed by: RADIOLOGY

## 2025-04-11 PROCEDURE — 72157 MRI CHEST SPINE W/O & W/DYE: CPT | Mod: TC

## 2025-04-11 RX ORDER — GADOBUTROL 604.72 MG/ML
9 INJECTION INTRAVENOUS
Status: COMPLETED | OUTPATIENT
Start: 2025-04-11 | End: 2025-04-11

## 2025-04-11 RX ADMIN — GADOBUTROL 9 ML: 604.72 INJECTION INTRAVENOUS at 12:04

## 2025-04-15 ENCOUNTER — TELEPHONE (OUTPATIENT)
Dept: NEUROSURGERY | Facility: CLINIC | Age: 70
End: 2025-04-15
Payer: MEDICARE

## 2025-04-15 ENCOUNTER — OFFICE VISIT (OUTPATIENT)
Dept: NEUROSURGERY | Facility: CLINIC | Age: 70
End: 2025-04-15
Payer: MEDICARE

## 2025-04-15 VITALS
DIASTOLIC BLOOD PRESSURE: 59 MMHG | WEIGHT: 197.75 LBS | HEIGHT: 67 IN | SYSTOLIC BLOOD PRESSURE: 124 MMHG | HEART RATE: 57 BPM | BODY MASS INDEX: 31.04 KG/M2

## 2025-04-15 DIAGNOSIS — G95.89 SPINAL CORD MASS: ICD-10-CM

## 2025-04-15 DIAGNOSIS — R93.7 ABNORMAL MRI, THORACIC SPINE: Primary | ICD-10-CM

## 2025-04-15 PROCEDURE — 99999 PR PBB SHADOW E&M-EST. PATIENT-LVL V: CPT | Mod: PBBFAC,,, | Performed by: PSYCHIATRY & NEUROLOGY

## 2025-04-15 PROCEDURE — 99205 OFFICE O/P NEW HI 60 MIN: CPT | Mod: S$PBB,,, | Performed by: PSYCHIATRY & NEUROLOGY

## 2025-04-15 PROCEDURE — 99215 OFFICE O/P EST HI 40 MIN: CPT | Mod: PBBFAC | Performed by: PSYCHIATRY & NEUROLOGY

## 2025-04-15 PROCEDURE — G2211 COMPLEX E/M VISIT ADD ON: HCPCS | Mod: S$PBB,,, | Performed by: PSYCHIATRY & NEUROLOGY

## 2025-04-15 NOTE — Clinical Note
Hi Josephine William and Li,  We repeated Alessia's MRI thoracic spine, then we also finally got her outside images, and good news - there's no evidence of mass lesion or malignancy in the spine. 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 She will see neurosurgery to discuss possible fenestration. From my perspective, there's no evidence of CNS disease, so she's cleared from me to start your preferred initial treatment for her NSCLC.  Thanks for the referral. Let me know if you have any other concerns.  Julissa

## 2025-04-15 NOTE — PROGRESS NOTES
Subjective:       Patient ID: Alessia Nelson is a 69 y.o. female.    Chief Complaint: abnormal MRI of the thoracic spine    Oncologic History  10/2023: on screening CT chest for lung cancer, bilateral nodules noted, thought benign based on radiographic criteria  4/2024: surveillance CT with nodule suspicious for malignancy that had increased in size from prior scan  5/2024: PET scan with no avidity  2/2025: EBUS with pathology consistent with squamous cell carcinoma  3/2025: MRI and CT myelogram of T-spine performed due to chronic upper back pain with radiation down bilateral arms, read as mass lesion  4/2025: repeat MRI completed here and prior MRIs uploaded; no change from apparent arachnoid web causing compression and myelopathic changes, no evidence for mass lesion    Alessia presents today alone.  She has had pain in her mid to upper back for over a year.  This has progressively worsened, and now she has near constant burning pain.  She is followed by outside pain management.  She also notices numbness of the hands in the morning.  This initially started with her fingertips, then her left hand, and now both of her hands are numb in the morning.  She says the entire hand including all of her fingers is numb when she wakes up.  During the day she does not have numbness.    Past Medical History:   Diagnosis Date    Acute pancreatitis     Back pain     CAD (coronary artery disease)     CHF (congestive heart failure)     COPD (chronic obstructive pulmonary disease)     Depression     Diverticulosis     Encounter for blood transfusion     GERD (gastroesophageal reflux disease)     History of blood clots     1986 AFTER BOWEL RESCETION    History of bowel resection     Hyperlipidemia     Hypertension     Peritonitis     PONV (postoperative nausea and vomiting)     Pulmonary nodule     Seizures     HAD A SEIZURE FROM PHENERGAN     Stroke     left sided weakness    Thyroid disease     TIA (transient ischemic attack)      Wears dentures     upper      Current Medications[1]   Past Surgical History:   Procedure Laterality Date    ADRENAL GLAND SURGERY      APPENDECTOMY      BACK SURGERY      CAUDAL EPIDURAL STEROID INJECTION N/A 03/01/2021    Procedure: Injection-steroid-epidural-caudal;  Surgeon: Socorro Lorenz MD;  Location: [facility] OR;  Service: Pain Management;  Laterality: N/A;    CHOLECYSTECTOMY      COLONOSCOPY      COLONOSCOPY N/A 06/10/2021    Procedure: COLONOSCOPY;  Surgeon: Sheree Metz MD;  Location: [facility] ENDO;  Service: Endoscopy;  Laterality: N/A;    COLONOSCOPY N/A 03/08/2022    Procedure: COLONOSCOPY;  Surgeon: Maurilio Rodriguez MD;  Location: [facility] (2ND FLR);  Service: Endoscopy;  Laterality: N/A;  Pt to perform at-home COVID test morning of procedure-DS  2/24-Blood thinner approval rec'[facility] Dr. Walsh (see letters tab 2/24/22)-DS  3/2-Instructions sent via email-DS  3/7-Pt unable to come earlier due to ride-DS    CORONARY STENT PLACEMENT      CYSTOURETHROSCOPY N/A 11/13/2019    Procedure: CYSTOURETHROSCOPY;  Surgeon: Shun Nuñez MD;  Location: [facility] OR;  Service: Urology;  Laterality: N/A;    DIRECT DIAGNOSTIC LARYNGOSCOPY WITH BRONCHOSCOPY AND ESOPHAGOSCOPY N/A 01/03/2024    Procedure: LARYNGOSCOPY, DIRECT, DIAGNOSTIC, WITH BRONCHOSCOPY AND ESOPHAGOSCOPY;  Surgeon: Mir Belle MD;  Location: [facility] OR;  Service: ENT;  Laterality: N/A;    ENDOBRONCHIAL ULTRASOUND Bilateral 2/20/2025    Procedure: ENDOBRONCHIAL ULTRASOUND (EBUS);  Surgeon: Ismael Cooper MD;  Location: [facility] OR;  Service: Pulmonary;  Laterality: Bilateral;    ENDOSCOPIC ULTRASOUND OF LOWER GASTROINTESTINAL TRACT N/A 03/08/2022    Procedure: ULTRASOUND, LOWER GI TRACT, ENDOSCOPIC;  Surgeon: Maurilio Rodriguez MD;  Location: [facility] (2ND FLR);  Service: Endoscopy;  Laterality: N/A;  Pt to perform at-home COVID test morning of procedure-DS  2/24-Blood thinner approval rec'[facility] Dr. Walsh (see letters tab 2/24/22)-DS  3/2-Instructions sent  via email-DS  3/7-Pt unable to come earlier due to ride-DS    ENDOSCOPIC ULTRASOUND OF UPPER GASTROINTESTINAL TRACT N/A 11/25/2019    Procedure: ULTRASOUND, UPPER GI TRACT, ENDOSCOPIC;  Surgeon: Alhaji Bridges MD;  Location: [facility] (2ND FLR);  Service: Endoscopy;  Laterality: N/A;  5 day hold Plavix, Dr Jovanni Serrano - pg  PM prep    ENDOSCOPIC ULTRASOUND OF UPPER GASTROINTESTINAL TRACT N/A 11/02/2020    Procedure: ULTRASOUND, UPPER GI TRACT, ENDOSCOPIC;  Surgeon: Maurilio Rodriguez MD;  Location: [facility] (2ND FLR);  Service: Endoscopy;  Laterality: N/A;  5 day hold Plavix, Dr Roman Walsh - pg  Covid-19 test 10/30/20 at [facility]    EPIDURAL STEROID INJECTION INTO CERVICAL SPINE N/A 12/08/2021    Procedure: Injection-steroid-epidural-cervical;  Surgeon: Socorro Lorenz MD;  Location: [facility] OR;  Service: Pain Management;  Laterality: N/A;    EPIDURAL STEROID INJECTION INTO CERVICAL SPINE N/A 11/30/2023    Procedure: Injection-steroid-epidural-cervical;  Surgeon: Maurilio Carroll MD;  Location: [facility] OR;  Service: Anesthesiology;  Laterality: N/A;  c7-T1    ESOPHAGOGASTRODUODENOSCOPY N/A 06/10/2021    Procedure: EGD (ESOPHAGOGASTRODUODENOSCOPY);  Surgeon: Sheree Metz MD;  Location: [facility] ENDO;  Service: Endoscopy;  Laterality: N/A;    ESOPHAGOGASTRODUODENOSCOPY N/A 09/26/2024    Procedure: EGD (ESOPHAGOGASTRODUODENOSCOPY);  Surgeon: Jovanny Saldivar MD;  Location: [facility] ENDO;  Service: Endoscopy;  Laterality: N/A;    ESOPHAGOSCOPY, USING BOUGIE, WITH DILATION N/A 01/03/2024    Procedure: ESOPHAGOSCOPY, USING BOUGIE, WITH DILATION;  Surgeon: Mir Belle MD;  Location: [facility] OR;  Service: ENT;  Laterality: N/A;    FRACTURE SURGERY Left 05/02/2022    ankle    HERNIA REPAIR      HYSTERECTOMY      INCISIONAL HERNIA REPAIR      INJECTION OF ANESTHETIC AGENT AROUND NERVE Right 05/27/2019    Procedure: RIGHT L5-S3 MEDIAL BRANCH BLOCKS;  Surgeon: Sneha Aragon MD;  Location: [facility];  Service: Pain  Management;  Laterality: Right;  **DO NOT STOP PLAVIX**    INJECTION OF JOINT Right 09/22/2021    Procedure: Injection, Joint; Right SI Joint Injection;  Surgeon: Socorro Lorenz MD;  Location: [facility] OR;  Service: Pain Management;  Laterality: Right;  Oral    INSERTION OF DORSAL COLUMN NERVE STIMULATOR FOR TRIAL N/A 06/07/2021    Procedure: INSERTION, NEUROSTIMULATOR, SPINAL CORD, DORSAL COLUMN, FOR TRIAL;  Surgeon: Socorro Lorenz MD;  Location: [facility] OR;  Service: Pain Management;  Laterality: N/A;  nevro rep confirmed start time    instestine      bowel section    KNEE ARTHROPLASTY Right 12/18/2019    Procedure: ARTHROPLASTY, KNEE;  Surgeon: Ike Briceño II, MD;  Location: [facility] OR;  Service: Orthopedics;  Laterality: Right;    KNEE SURGERY  1983    OOPHORECTOMY      PARATHYROID GLAND SURGERY      3 surgeries    RADIOFREQUENCY ABLATION Right 06/10/2019    Procedure: Radiofrequency Ablation - RIGHT L3-5 RADIOFREQUENCY ABLATION WITH [employer]ER[employer]AL SYSTEM;  Surgeon: Sneha Aragon MD;  Location: [facility] OR;  Service: Pain Management;  Laterality: Right;  **HOLD PLAVIX x 7 DAYS PRIOR**    REVISION OF KNEE ARTHROPLASTY Right     AVALA with Dr Singh    ROBOTIC BRONCHOSCOPY Bilateral 2/20/2025    Procedure: ROBOTIC BRONCHOSCOPY;  Surgeon: Ismael Cooper MD;  Location: [facility] OR;  Service: Pulmonary;  Laterality: Bilateral;    SPINAL CORD STIMULATOR REMOVAL N/A 02/21/2024    Procedure: REMOVAL, NEUROSTIMULATOR, SPINAL;  Surgeon: Maurilio Carroll MD;  Location: [facility] OR;  Service: Pain Management;  Laterality: N/A;  removal of spinal cord stimulator    UPPER GASTROINTESTINAL ENDOSCOPY        Family History   Problem Relation Name Age of Onset    Stroke Maternal Grandmother      Cancer Maternal Grandfather      Stroke Mother      Heart disease Father      Breast cancer Sister        Social History[2]   Review of Systems        Objective:      Vitals:    04/15/25 0914   BP: (!) 124/59   Pulse: (!) 57         NEUROLOGICAL EXAMINATION:     MENTAL STATUS   Attention: normal. Concentration: normal.   Speech: speech is normal   Level of consciousness: alert    CRANIAL NERVES   Cranial nerves II through XII intact.     MOTOR EXAM   Muscle bulk: normal  Overall muscle tone: normal    Strength   Strength 5/5 throughout.     REFLEXES     Reflexes   Right brachioradialis: 2+  Left brachioradialis: 2+  Right biceps: 2+  Left biceps: 2+  Right triceps: 1+  Left triceps: 1+  Right patellar: 1+  Left patellar: 1+  Right achilles: 0  Left achilles: 0    SENSORY EXAM   Light touch normal.     GAIT AND COORDINATION      Coordination   Finger to nose coordination: normal       MRI of the thoracic spine from 4/11/2025 was personally visualized and compared to prior. This is consistent with thoracic arachnoid webs with myelomalacia and no evidence of mass lesion.     Assessment:       Problem List Items Addressed This Visit    None  Visit Diagnoses         Abnormal MRI, thoracic spine    -  Primary    Relevant Orders    Ambulatory consult to Neurosurgery      Spinal cord mass                Plan:       Alessia Nelson is a 69 y.o. female with NSCLC presenting for evaluation of an abnormal MRI of the thoracic spine.  Initially, there was concern for a mass lesion, but upon repeat scanning and review by our neuroradiologist, the abnormalities most consistent with arachnoid web with mild cord compression and myelopathic changes.  I will refer her to Neurosurgery for discussion of possible fenestration.  I will discuss these findings with her oncologist and let them know that from my perspective there is no additional concern for malignancy in the spine.          60 minutes of total time spent on the encounter, which includes face to face time and non-face to face time preparing to see the patient (eg, review of tests), Obtaining and/or reviewing separately obtained history, Documenting clinical information in the electronic or other  health record, Independently interpreting results (not separately reported) and communicating results to the patient/family/caregiver, or Care coordination (not separately reported).     Julissa Staples MD  Department of Neurology  Neuro-Oncology Section           [1]   Current Outpatient Medications   Medication Sig Dispense Refill    acetaminophen (TYLENOL) 325 MG tablet Take 2 tablets (650 mg total) by mouth every 6 (six) hours as needed for Pain (Do not take with any other tylenol containing products).  0    allopurinoL (ZYLOPRIM) 300 MG tablet TAKE 1 TABLET DAILY 90 tablet 1    amLODIPine (NORVASC) 5 MG tablet TAKE 1 TABLET(5 MG) BY MOUTH TWICE DAILY 180 tablet 1    busPIRone (BUSPAR) 5 MG Tab Take 1 tablet (5 mg total) by mouth 2 (two) times daily. 180 tablet 3    clopidogreL (PLAVIX) 75 mg tablet Take 1 tablet (75 mg total) by mouth once daily. 90 tablet 3    colestipoL (COLESTID) 1 gram Tab TAKE 2 TABLETS ONCE DAILY 180 tablet 1    cyanocobalamin 1,000 mcg/mL injection INJECT 1 ML IN THE MUSCLE EVERY 14 DAYS 10 mL 0    EScitalopram oxalate (LEXAPRO) 20 MG tablet TAKE 1 TABLET EVERY EVENING 90 tablet 3    fluticasone propionate (FLONASE) 50 mcg/actuation nasal spray 2 sprays (100 mcg total) by Each Nostril route 2 (two) times daily. 48 g 3    furosemide (LASIX) 20 MG tablet TAKE 1 TABLET(20 MG) BY MOUTH EVERY DAY 90 tablet 3    gabapentin (NEURONTIN) 400 MG capsule Take 1 capsule (400 mg total) by mouth 2 (two) times daily. 180 capsule 3    hydrALAZINE (APRESOLINE) 25 MG tablet Take 1 tablet (25 mg total) by mouth daily as needed (Check BP after taking amlodipine, Lasix, metoprolol.  If it continues to stay greater than 160/100 take hydralazine). 30 tablet 0    levothyroxine (SYNTHROID) 75 MCG tablet TAKE 1 TABLET DAILY 90 tablet 3    magnesium oxide (MAG-OX) 400 mg (241.3 mg magnesium) tablet Take 1 tablet (400 mg total) by mouth once daily.      methocarbamoL (ROBAXIN) 750 MG Tab Take 1 tablet (750 mg  total) by mouth every 8 (eight) hours as needed (muscle spasm). 270 tablet 3    metoprolol succinate (TOPROL-XL) 50 MG 24 hr tablet Take 1 tablet (50 mg total) by mouth once daily. Pt takes 1/2 tab daily 90 tablet 2    oxyCODONE-acetaminophen (PERCOCET) 5-325 mg per tablet Take 1 tablet by mouth every 8 (eight) hours as needed.      pantoprazole (PROTONIX) 40 MG tablet TAKE 1 TABLET DAILY 90 tablet 3    potassium chloride SA (K-DUR,KLOR-CON M) 10 MEQ tablet Take 1 tablet (10 mEq total) by mouth once daily. 90 tablet 3    rosuvastatin (CRESTOR) 10 MG tablet TAKE 1 TABLET DAILY 90 tablet 3    traZODone (DESYREL) 100 MG tablet TAKE 1 TABLET EVERY EVENING 90 tablet 3    vit A/vit C/vit E/zinc/copper (PRESERVISION AREDS ORAL) Take 1 capsule by mouth 2 (two) times a day.      vitamin D (VITAMIN D3) 1000 units Tab Take 1,000 Units by mouth 2 (two) times a day.      ZENPEP 40,000-126,000- 168,000 unit CpDR Take 1 capsule by mouth once daily.       No current facility-administered medications for this visit.     Facility-Administered Medications Ordered in Other Visits   Medication Dose Route Frequency Provider Last Rate Last Admin    oxyCODONE immediate release tablet 5 mg  5 mg Oral Once PRN Paul Nunez MD       [2]   Social History  Tobacco Use    Smoking status: Former     Current packs/day: 0.00     Average packs/day: 1 pack/day for 46.0 years (46.0 ttl pk-yrs)     Types: Cigarettes     Start date: 1977     Quit date: 2023     Years since quittin.2    Smokeless tobacco: Never   Substance Use Topics    Alcohol use: Not Currently    Drug use: Yes     Comment: medical marijuana

## 2025-04-15 NOTE — PATIENT INSTRUCTIONS
"The MRI of your spine does not show any evidence of cancer. I will communicate this to your oncologist so that they can begin your lung cancer treatment.    The MRI does show something called an "arachnoid web," which is causing buildup of spinal fluid around your spine. This is likely causing your back pain. I will refer you to neurosurgery to discuss a procedure that can help restore the correct flow of spinal fluid in that area.  "

## 2025-04-16 ENCOUNTER — DOCUMENTATION ONLY (OUTPATIENT)
Dept: HEMATOLOGY/ONCOLOGY | Facility: CLINIC | Age: 70
End: 2025-04-16
Payer: MEDICARE

## 2025-04-16 ENCOUNTER — TELEPHONE (OUTPATIENT)
Dept: HEMATOLOGY/ONCOLOGY | Facility: CLINIC | Age: 70
End: 2025-04-16
Payer: MEDICARE

## 2025-04-16 ENCOUNTER — CLINICAL SUPPORT (OUTPATIENT)
Dept: REHABILITATION | Facility: HOSPITAL | Age: 70
End: 2025-04-16
Payer: MEDICARE

## 2025-04-16 DIAGNOSIS — Z74.09 IMPAIRED FUNCTIONAL MOBILITY AND ACTIVITY TOLERANCE: Primary | ICD-10-CM

## 2025-04-16 PROCEDURE — 97110 THERAPEUTIC EXERCISES: CPT | Mod: PN,CQ

## 2025-04-16 PROCEDURE — 97112 NEUROMUSCULAR REEDUCATION: CPT | Mod: PN,CQ

## 2025-04-16 PROCEDURE — 97530 THERAPEUTIC ACTIVITIES: CPT | Mod: PN,CQ

## 2025-04-16 NOTE — NURSING
Chart reviewed for oncology navigational needs following her visit with Dr. Staples.  Per Dr. Staples, there is not concern for malignancy of the spine.  Rad onc and med onc follow ups are scheduled for next week.   Will continue to follow for treatment plans.

## 2025-04-16 NOTE — PROGRESS NOTES
"  Outpatient Rehab    Physical Therapy Visit    Patient Name: Alessia Nelson  MRN: [id number]  YOB: 1955  Encounter Date: 4/16/2025    Therapy Diagnosis:   Encounter Diagnosis   Name Primary?    Impaired functional mobility and activity tolerance Yes     Physician: Michele Singh MD    Physician Orders: Eval and Treat  Medical Diagnosis: Presence of right artificial knee joint    Visit # / Visits Authorized:  10 / 12  Insurance Authorization Period: 2/24/2025 to 12/31/2025  Date of Evaluation: 2/24/2025  Plan of Care Certification: 2/24/2025 to 6/24/2025     PT/PTA:     Number of PTA visits since last PT visit:  2  Time In:   10:55 AM  Time Out:   11:55 AM  Total Time:   60 Minutes  Total Billable Time:   55 Minutes    FOTO:  Intake Score:  %  Survey Score 1:  %  Survey Score 2:  %         Subjective   I am just hurting today. I finally have a diagnosis for my back- arachnoid web.  Pain reported as 6/10. Back    Objective            Treatment:  Therapeutic Exercise  TE 1: Seated ham curl w/ band (green ) x 15  TE 2: Seated LAQ x 20; 0#  TE 3: Seated knee hang x 1 min; 0#  Balance/Neuromuscular Re-Education  NMR 1: SAQ's over tan roll - alternate LE's - 2 mins with 3#  NMR 2: Quad sets - 5" H x 2 mins - small towel roll under knee  NMR 3: Toe-Taps - 8" step while standing on Air-Ex x 2 mins  NMR 4: Achilles stretch on wedge - 30 sec x 3  NMR 5: Squat x 10 cueing for improved hip hinging  NMR 6: 3-way hip - x 15 - red tband  NMR 7: Heel raises x 20  Therapeutic Activity  TA 1: Recumbent Bike - Level 2 x 15 mins  TA 2: SLR - 10 x 2  TA 3: Sit <> Stand x 10  TA 4: S/L hip abduction x 10  TA 5: S/L clams x 15  TA 6: Step-ups: 6" step x 15 leading with each leg - forward step  TA 7: Lateral step-up with RLE 6" step x 15  TA 8: Bridges x 15  TA 9: Ball Squeezes x 2 mins    Time Entry(in minutes):  Neuromuscular Re-Education Time Entry: 15  Therapeutic Activity Time Entry: 30  Therapeutic Exercise Time Entry: " 10    Assessment & Plan   Assessment: Patient did well with exercises; no exacerbation       Patient will continue to benefit from skilled outpatient physical therapy to address the deficits listed in the problem list box on initial evaluation, provide pt/family education and to maximize pt's level of independence in the home and community environment.     Patient's spiritual, cultural, and educational needs considered and patient agreeable to plan of care and goals.           Plan: Continue to advance patient as tolerated per POC for progress toward LTGs    Goals:   Active       LTG's - 6 weeks        Able to perform all household activities with no limitation  (Progressing)       Start:  02/24/25    Expected End:  04/11/25            Able to ambulate community required distances with no limitation  (Progressing)       Start:  02/24/25    Expected End:  04/11/25            Able to ascend/descend a flight of steps with single railing with reciprocal pattern, no limitation  (Progressing)       Start:  02/24/25    Expected End:  04/11/25            Improve RLE strength by 1/2 grade to improve gait/balance and function  (Progressing)       Start:  02/24/25    Expected End:  04/11/25            Right knee AROM - 0 - 125 degrees without increased symptoms.  (Progressing)       Start:  02/24/25    Expected End:  04/11/25               STG's - 3 weeks        Demonstrate improvement in recent symptoms to progress toward long term goals  (Met)       Start:  02/24/25    Expected End:  03/21/25    Resolved:  03/30/25         Correct postural deficits to reduce pain; improve postural awareness for injury prevention  (Progressing)       Start:  02/24/25    Expected End:  03/21/25            Demonstrate compliance with initial exercise program  (Met)       Start:  02/24/25    Expected End:  03/21/25    Resolved:  03/30/25             Jonathan Favre, PTA

## 2025-04-16 NOTE — TELEPHONE ENCOUNTER
Patient states Dr Staples told her no cancer in spine it was an arachnoid web. Planning a procedure to help with this. Confirmed upcoming appt iwht Dr Stewart Longoria to discuss treatment.

## 2025-04-16 NOTE — TELEPHONE ENCOUNTER
----- Message from [place] sent at 4/16/2025  3:34 PM CDT -----  Type: Needs Medical AdviceWho Called:  Patient Symptoms (please be specific):  How long has patient had these symptoms:  Pharmacy name and phone #:  Best Call Back Number: [phone] Information: Patient is requesting a call back from the nurse regarding  questions.

## 2025-04-17 ENCOUNTER — EXTERNAL HOME HEALTH (OUTPATIENT)
Dept: HOME HEALTH SERVICES | Facility: HOSPITAL | Age: 70
End: 2025-04-17
Payer: MEDICARE

## 2025-04-21 ENCOUNTER — HOSPITAL ENCOUNTER (OUTPATIENT)
Dept: RADIATION THERAPY | Facility: HOSPITAL | Age: 70
Discharge: HOME | End: 2025-04-21
Attending: RADIOLOGY
Payer: MEDICARE

## 2025-04-21 ENCOUNTER — OFFICE VISIT (OUTPATIENT)
Dept: HEMATOLOGY/ONCOLOGY | Facility: CLINIC | Age: 70
End: 2025-04-21
Payer: MEDICARE

## 2025-04-21 ENCOUNTER — OFFICE VISIT (OUTPATIENT)
Dept: RADIATION ONCOLOGY | Facility: CLINIC | Age: 70
End: 2025-04-21
Payer: MEDICARE

## 2025-04-21 VITALS
HEART RATE: 67 BPM | BODY MASS INDEX: 31.63 KG/M2 | HEIGHT: 67 IN | SYSTOLIC BLOOD PRESSURE: 148 MMHG | RESPIRATION RATE: 18 BRPM | DIASTOLIC BLOOD PRESSURE: 78 MMHG | WEIGHT: 201.5 LBS | TEMPERATURE: 98 F

## 2025-04-21 VITALS
WEIGHT: 197.75 LBS | TEMPERATURE: 98 F | OXYGEN SATURATION: 98 % | BODY MASS INDEX: 31.04 KG/M2 | DIASTOLIC BLOOD PRESSURE: 81 MMHG | HEIGHT: 67 IN | HEART RATE: 61 BPM | SYSTOLIC BLOOD PRESSURE: 135 MMHG | RESPIRATION RATE: 20 BRPM

## 2025-04-21 DIAGNOSIS — G03.9: ICD-10-CM

## 2025-04-21 DIAGNOSIS — C34.90 MALIGNANT NEOPLASM OF UNSPECIFIED PART OF UNSPECIFIED BRONCHUS OR LUNG: Primary | ICD-10-CM

## 2025-04-21 DIAGNOSIS — E31.21 MEN1 (MULTIPLE ENDOCRINE NEOPLASIA): ICD-10-CM

## 2025-04-21 DIAGNOSIS — I10 HYPERTENSION, UNSPECIFIED TYPE: ICD-10-CM

## 2025-04-21 DIAGNOSIS — E03.9 HYPOTHYROIDISM, UNSPECIFIED TYPE: ICD-10-CM

## 2025-04-21 DIAGNOSIS — K86.89 PANCREATIC INSUFFICIENCY: ICD-10-CM

## 2025-04-21 PROCEDURE — 99999 PR PBB SHADOW E&M-EST. PATIENT-LVL V: CPT | Mod: PBBFAC,,, | Performed by: RADIOLOGY

## 2025-04-21 PROCEDURE — 99215 OFFICE O/P EST HI 40 MIN: CPT | Mod: S$PBB,,, | Performed by: INTERNAL MEDICINE

## 2025-04-21 PROCEDURE — 99215 OFFICE O/P EST HI 40 MIN: CPT | Mod: PBBFAC,PN | Performed by: RADIOLOGY

## 2025-04-21 PROCEDURE — 77014 PR  CT GUIDANCE PLACEMENT RAD THERAPY FIELDS: CPT | Mod: 26,,, | Performed by: RADIOLOGY

## 2025-04-21 PROCEDURE — 77263 THER RADIOLOGY TX PLNG CPLX: CPT | Mod: ,,, | Performed by: RADIOLOGY

## 2025-04-21 PROCEDURE — 99215 OFFICE O/P EST HI 40 MIN: CPT | Mod: PBBFAC,27,PN | Performed by: INTERNAL MEDICINE

## 2025-04-21 PROCEDURE — 99214 OFFICE O/P EST MOD 30 MIN: CPT | Mod: S$PBB,25,, | Performed by: RADIOLOGY

## 2025-04-21 PROCEDURE — 99999 PR PBB SHADOW E&M-EST. PATIENT-LVL V: CPT | Mod: PBBFAC,,, | Performed by: INTERNAL MEDICINE

## 2025-04-21 PROCEDURE — 77334 RADIATION TREATMENT AID(S): CPT | Mod: 26,,, | Performed by: RADIOLOGY

## 2025-04-21 PROCEDURE — 77334 RADIATION TREATMENT AID(S): CPT | Mod: TC,PN | Performed by: RADIOLOGY

## 2025-04-21 PROCEDURE — 77014 HC CT GUIDANCE RADIATION THERAPY FLDS PLACEMENT: CPT | Mod: TC,PN | Performed by: RADIOLOGY

## 2025-04-21 NOTE — PROGRESS NOTES
[facility] Department of Radiation Oncology  Follow Up Visit Note    Diagnosis:  Alessia Nelson is a 69 y.o. 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          Oncologic History:  Oncology History   Cancer of lower lobe of right lung   3/14/2025 Initial Diagnosis    Cancer of lower lobe of right lung     3/14/2025 Cancer Staged    Staging form: Lung, AJCC V9  - Clinical stage from 3/14/2025: cT1c, cN2(f), cM0          Interval History  The patient presents today for to finalize treatment plans. She was recently evaluated by Neuro-Onc who with review from neuro-radiology determine her spinal abnormality to be related to a arachnoid web.  She is scheduled to be evaluated by [facility] Neurosurgery next week regarding this issue. She has already decided that she would like to proceed with therapy and have her therapy performed in [place]. She has no new respiratory symptoms. She is taking cannibus gummies for her pain control    Review of Systems:   Constistutional: Denies fever, chills. No recent weight changes. Denies nights sweats.  Eyes: No redness or dryness.  ENT: Denies dry mouth. No ulcers.  Card: Denies chest pain. No PND, or orthopnea.   Resp: Denies cough. Denies shortness of breath.  Gastro: Denies nausea or vomiting, constipation, diarrhea.  Genito: No kidney stones. Denies dysuria.  Skin: No rash, psoriasis, or alopecia.  Musculoskeletal:No myalgia. No muscle weakness.  Neuro: No numbness or tingling. No history of seizures or psychosis.  Psych: Denies depression. Denies anxiety.  Endo: Denies history of diabetes. No thyroid disease.  Heme: Denies anemia. No blood clots or bleeding diathesis.  Allergic: Denies seasonal allergies.   All other systems negative    Social History:  Social History[1]    Family History:  Cancer-related family history includes Breast cancer in her sister; Cancer in her maternal  "grandfather.    Exam:  Vitals:    25 0905   BP: 135/81   Pulse: 61   Resp: 20   Temp: 98.1 °F (36.7 °C)   SpO2: 98%   Weight: 89.7 kg (197 lb 12 oz)   Height: 5' 7" (1.702 m)     Constitutional: Pleasant 69 y.o. female in no acute distress.  Well nourished. Well groomed.   HEENT: Normocephalic and atraumatic   Lungs: No audible wheezing.  Normal effort.   Musculoskeletal: No gross MSK deformities. Ambulates  Skin: No rashes appreciated.   Psych: Alert and oriented with appropriate mood and affect.  Neuro:   Grossly normal.    Data Review:    Independent Interpretation of Test(s): n/a  Discussion with Another Provider or Non-healthcare Professional:  I discussed this case with Dr. William in order to coordinate care.      Assessment:  Mrs. Nelson is a 69 year old with Stage IIIA NSCLC of the right lung.  ECOG: (1) Restricted in physically strenuous activity, ambulatory and able to do work of light nature    Plan:  We discussed the rational, logistics, and side effects of radiation to the lung with Mrs. Nelson in clinic today. She is aware of these factors and would like to proceed with CT simulation. She has signed consent stating such. Goal will be to deliver 66 Gy in 30 fractions daily with concurrent chemotherapy.  She was given our contact information, and she was told that she could call our clinic at anytime if she has any questions or concerns.  Follow up with other providers as directed.    40 min visit spent in chart review, face to face discussion of goals of care, symptom assessment, coordination of care and emotional support.        Bereket Jefferson MD  Radiation Oncology             [1]   Social History  Tobacco Use    Smoking status: Former     Current packs/day: 0.00     Average packs/day: 1 pack/day for 46.0 years (46.0 ttl pk-yrs)     Types: Cigarettes     Start date: 1977     Quit date: 2023     Years since quittin.3    Smokeless tobacco: Never   Substance Use Topics    Alcohol " use: Not Currently    Drug use: Yes     Comment: medical marijuana

## 2025-04-21 NOTE — PROGRESS NOTES
PATIENT: Alessia Nelson  MRN: [id number]  DATE: 4/21/2025      Diagnosis:   1. Malignant neoplasm of unspecified part of unspecified bronchus or lung    2. Arachnoid membrane inflammation    3. Hypertension, unspecified type    4. MEN1 (multiple endocrine neoplasia)    5. Hypothyroidism, unspecified type    6. Pancreatic insufficiency            Chief Complaint: Follow-up (NSCLC)      Oncologic History:      Oncologic History     Oncologic Treatment     Pathology           Subjective:    Interval History:  Ms. Nelson is a 69 y.o. female with CAD, CHF, COPD, depression, GERD, HTN, HLD, Seizures, h/o CVA, thyroid disease who presents for NSCLC.  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  Dr Staples 4/15/25 felt the patient needed fenestration with neurosurgery.  Pt endorses continued pain in arms.  The patient denies CP, cough, SOB, abdominal pain, nausea, vomiting, constipation, diarrhea.  The patient denies fever, chills, night sweats, weight loss, new lumps or bumps, easy bruising or bleeding.    Prior History:  The patient was being followed for a nodule in the right lower lobe and had PET-CT on 05/22/2024 which showed no avidity.  CT chest on 11/11/2024 showed enlargement of the ground-glass right lower lobe lung nodule measuring 18 x 19 mm and small bilateral soft tissue pulmonary nodules measuring 3-8 mm.  The patient underwent robotic bronchoscopy and EBUS on 02/20/2025 with pathology showing squamous cell carcinoma and biopsy of right lower lung nodule and a positive station 7 lymph node.  Stations 4R, 11R S and 11 RI were negative.  TEMPUS tissue testing showed mutations in CDKN2A, FAT1, and TP53.  PD-L1 was <1%.  The patient underwent CT myelogram and MRI of the thoracic spine 03/13/2025 for chronic  upper back pain with radiation down bilateral arms.   CT thoracic spine on 03/13/2025 report obtained showing stable expansile mass of the superior thoracic cord extending from T2 through T4 measuring 55 mm in length by 10 mm medial lateral and 9 mm AP.  MRI T-spine on 03/13/2025 showed expansile mass in the thoracic cord concerning for potential ependymoma versus astrocytoma.    Past Medical History:   Past Medical History:   Diagnosis Date    Acute pancreatitis     Back pain     CAD (coronary artery disease)     CHF (congestive heart failure)     COPD (chronic obstructive pulmonary disease)     Depression     Diverticulosis     Encounter for blood transfusion     GERD (gastroesophageal reflux disease)     History of blood clots     1986 AFTER BOWEL RESCETION    History of bowel resection     Hyperlipidemia     Hypertension     Peritonitis     PONV (postoperative nausea and vomiting)     Pulmonary nodule     Seizures     HAD A SEIZURE FROM PHENERGAN     Stroke     left sided weakness    Thyroid disease     TIA (transient ischemic attack)     Wears dentures     upper       Past Surgical HIstory:   Past Surgical History:   Procedure Laterality Date    ADRENAL GLAND SURGERY      APPENDECTOMY      BACK SURGERY      CAUDAL EPIDURAL STEROID INJECTION N/A 03/01/2021    Procedure: Injection-steroid-epidural-caudal;  Surgeon: Socorro Lorenz MD;  Location: [facility] OR;  Service: Pain Management;  Laterality: N/A;    CHOLECYSTECTOMY      COLONOSCOPY      COLONOSCOPY N/A 06/10/2021    Procedure: COLONOSCOPY;  Surgeon: Sheree Metz MD;  Location: [facility];  Service: Endoscopy;  Laterality: N/A;    COLONOSCOPY N/A 03/08/2022    Procedure: COLONOSCOPY;  Surgeon: Maurilio Rodriguez MD;  Location: [facility] ([address] [place] [address]);  Service: Endoscopy;  Laterality: N/A;  Pt to perform at-home COVID test morning of procedure-DS  2/24-Blood thinner approval rec'[facility] Dr. Walsh (see letters tab 2/24/22)-DS  3/2-Instructions sent via  email-DS  3/7-Pt unable to come earlier due to ride-DS    CORONARY STENT PLACEMENT      CYSTOURETHROSCOPY N/A 11/13/2019    Procedure: CYSTOURETHROSCOPY;  Surgeon: Shun Nuñez MD;  Location: [facility] OR;  Service: Urology;  Laterality: N/A;    DIRECT DIAGNOSTIC LARYNGOSCOPY WITH BRONCHOSCOPY AND ESOPHAGOSCOPY N/A 01/03/2024    Procedure: LARYNGOSCOPY, DIRECT, DIAGNOSTIC, WITH BRONCHOSCOPY AND ESOPHAGOSCOPY;  Surgeon: Mir Belle MD;  Location: [facility] OR;  Service: ENT;  Laterality: N/A;    ENDOBRONCHIAL ULTRASOUND Bilateral 2/20/2025    Procedure: ENDOBRONCHIAL ULTRASOUND (EBUS);  Surgeon: Ismael Cooper MD;  Location: [facility] OR;  Service: Pulmonary;  Laterality: Bilateral;    ENDOSCOPIC ULTRASOUND OF LOWER GASTROINTESTINAL TRACT N/A 03/08/2022    Procedure: ULTRASOUND, LOWER GI TRACT, ENDOSCOPIC;  Surgeon: Maurilio Rodriguez MD;  Location: [facility] (2ND FLR);  Service: Endoscopy;  Laterality: N/A;  Pt to perform at-home COVID test morning of procedure-DS  2/24-Blood thinner approval rec'[facility] Dr. Walsh (see letters tab 2/24/22)-DS  3/2-Instructions sent via email-DS  3/7-Pt unable to come earlier due to ride-DS    ENDOSCOPIC ULTRASOUND OF UPPER GASTROINTESTINAL TRACT N/A 11/25/2019    Procedure: ULTRASOUND, UPPER GI TRACT, ENDOSCOPIC;  Surgeon: Alhaji Bridges MD;  Location: [facility] (2ND FLR);  Service: Endoscopy;  Laterality: N/A;  5 day hold Plavix, Dr Jovanni Serrano - pg  PM prep    ENDOSCOPIC ULTRASOUND OF UPPER GASTROINTESTINAL TRACT N/A 11/02/2020    Procedure: ULTRASOUND, UPPER GI TRACT, ENDOSCOPIC;  Surgeon: Maurilio Rodriguez MD;  Location: [facility] ENDO (2ND FLR);  Service: Endoscopy;  Laterality: N/A;  5 day hold Plavix, Dr Roman Walsh - pg  Covid-19 test 10/30/20 at [facility]    EPIDURAL STEROID INJECTION INTO CERVICAL SPINE N/A 12/08/2021    Procedure: Injection-steroid-epidural-cervical;  Surgeon: Socorro Lorenz MD;  Location: [facility] OR;  Service: Pain Management;  Laterality: N/A;    EPIDURAL  STEROID INJECTION INTO CERVICAL SPINE N/A 11/30/2023    Procedure: Injection-steroid-epidural-cervical;  Surgeon: Maurilio Carroll MD;  Location: [facility] OR;  Service: Anesthesiology;  Laterality: N/A;  c7-T1    ESOPHAGOGASTRODUODENOSCOPY N/A 06/10/2021    Procedure: EGD (ESOPHAGOGASTRODUODENOSCOPY);  Surgeon: Sheree Metz MD;  Location: [facility] ENDO;  Service: Endoscopy;  Laterality: N/A;    ESOPHAGOGASTRODUODENOSCOPY N/A 09/26/2024    Procedure: EGD (ESOPHAGOGASTRODUODENOSCOPY);  Surgeon: Jovanny Saldivar MD;  Location: [facility] ENDO;  Service: Endoscopy;  Laterality: N/A;    ESOPHAGOSCOPY, USING BOUGIE, WITH DILATION N/A 01/03/2024    Procedure: ESOPHAGOSCOPY, USING BOUGIE, WITH DILATION;  Surgeon: Mir Belle MD;  Location: [facility] OR;  Service: ENT;  Laterality: N/A;    FRACTURE SURGERY Left 05/02/2022    ankle    HERNIA REPAIR      HYSTERECTOMY      INCISIONAL HERNIA REPAIR      INJECTION OF ANESTHETIC AGENT AROUND NERVE Right 05/27/2019    Procedure: RIGHT L5-S3 MEDIAL BRANCH BLOCKS;  Surgeon: Sneha Aragon MD;  Location: [facility] OR;  Service: Pain Management;  Laterality: Right;  **DO NOT STOP PLAVIX**    INJECTION OF JOINT Right 09/22/2021    Procedure: Injection, Joint; Right SI Joint Injection;  Surgeon: Socorro Lorenz MD;  Location: [facility] OR;  Service: Pain Management;  Laterality: Right;  Oral    INSERTION OF DORSAL COLUMN NERVE STIMULATOR FOR TRIAL N/A 06/07/2021    Procedure: INSERTION, NEUROSTIMULATOR, SPINAL CORD, DORSAL COLUMN, FOR TRIAL;  Surgeon: Socorro Lorenz MD;  [facility]: [facility] OR;  Service: Pain Management;  Laterality: N/A;  nevro rep confirmed start time    instestine      bowel section    KNEE ARTHROPLASTY Right 12/18/2019    Procedure: ARTHROPLASTY, KNEE;  Surgeon: Ike Briceño II, MD;  Location: [facility] OR;  Service: Orthopedics;  Laterality: Right;    KNEE SURGERY  1983    OOPHORECTOMY      PARATHYROID GLAND SURGERY      3 surgeries    RADIOFREQUENCY ABLATION Right 06/10/2019     "Procedure: Radiofrequency Ablation - RIGHT L3-5 RADIOFREQUENCY ABLATION WITH [employer]ARD COOLIEF THERMAL SYSTEM;  Surgeon: Sneha Aragon MD;  Location: [facility] OR;  Service: Pain Management;  Laterality: Right;  **HOLD PLAVIX x 7 DAYS PRIOR**    REVISION OF KNEE ARTHROPLASTY Right     AVALA with Dr Singh    ROBOTIC BRONCHOSCOPY Bilateral 2/20/2025    Procedure: ROBOTIC BRONCHOSCOPY;  Surgeon: Ismael Cooper MD;  Location: [facility] OR;  Service: Pulmonary;  Laterality: Bilateral;    SPINAL CORD STIMULATOR REMOVAL N/A 02/21/2024    Procedure: REMOVAL, NEUROSTIMULATOR, SPINAL;  Surgeon: Maurilio Carroll MD;  Location: [facility] OR;  Service: Pain Management;  Laterality: N/A;  removal of spinal cord stimulator    UPPER GASTROINTESTINAL ENDOSCOPY         Family History:   Family History   Problem Relation Name Age of Onset    Stroke Maternal Grandmother      Cancer Maternal Grandfather      Stroke Mother      Heart disease Father      Breast cancer Sister         Social History:  reports that she quit smoking about 2 years ago. Her smoking use included cigarettes. She started smoking about 48 years ago. She has a 46 pack-year smoking history. She has never used smokeless tobacco. She reports that she does not currently use alcohol. She reports current drug use.    Allergies:  Review of patient's allergies indicates:   Allergen Reactions    Aspirin Other (See Comments)     "Makes stomach feel like it's on fire"    Phenergan [promethazine] Other (See Comments)     SEIZURES    Nickel     Lortab [hydrocodone-acetaminophen] Itching     Able to take generic Norco       Medications:  Current Medications[1]    Review of Systems   Constitutional:  Negative for chills, fatigue, fever and unexpected weight change.   Respiratory:  Negative for cough and shortness of breath.    Cardiovascular:  Negative for chest pain and palpitations.   Gastrointestinal:  Negative for abdominal pain, constipation, diarrhea, nausea and vomiting.   Musculoskeletal:  " "       Pain in bilateral dorsal arms   Skin:  Negative for rash.   Neurological:  Negative for headaches.   Hematological:  Negative for adenopathy. Does not bruise/bleed easily.       ECOG Performance Status: 2   Objective:      Vitals:   Vitals:    04/21/25 1306   BP: (!) 148/78   BP Location: Right arm   Patient Position: Sitting   Pulse: 67   Resp: 18   Temp: 98 °F (36.7 °C)   TempSrc: Temporal   Weight: 91.4 kg (201 lb 8 oz)   Height: 5' 7" (1.702 m)         Physical Exam  Constitutional:       General: She is not in acute distress.     Appearance: She is well-developed. She is not diaphoretic.   HENT:      Head: Normocephalic and atraumatic.   Cardiovascular:      Rate and Rhythm: Normal rate and regular rhythm.      Heart sounds: Normal heart sounds. No murmur heard.     No friction rub. No gallop.   Pulmonary:      Effort: Pulmonary effort is normal. No respiratory distress.      Breath sounds: Normal breath sounds. No wheezing or rales.   Chest:      Chest wall: No tenderness.   Abdominal:      General: Bowel sounds are normal. There is no distension.      Palpations: Abdomen is soft. There is no mass.      Tenderness: There is no abdominal tenderness. There is no guarding or rebound.   Lymphadenopathy:      Cervical: No cervical adenopathy.      Upper Body:      Right upper body: No supraclavicular or axillary adenopathy.      Left upper body: No supraclavicular or axillary adenopathy.   Skin:     Findings: No erythema or rash.   Neurological:      Mental Status: She is alert and oriented to person, place, and time.   Psychiatric:         Behavior: Behavior normal.         Laboratory Data:  No visits with results within 1 Week(s) from this visit.   Latest known visit with results is:   Lab Visit on 03/27/2025   Component Date Value Ref Range Status    Sodium 03/27/2025 142  136 - 145 mmol/L Final    Potassium 03/27/2025 4.0  3.5 - 5.1 mmol/L Final    Chloride 03/27/2025 108  95 - 110 mmol/L Final    CO2 " 03/27/2025 23  23 - 29 mmol/L Final    Glucose 03/27/2025 119 (H)  70 - 110 mg/dL Final    BUN 03/27/2025 18  8 - 23 mg/dL Final    Creatinine 03/27/2025 1.0  0.5 - 1.4 mg/dL Final    Calcium 03/27/2025 10.4  8.7 - 10.5 mg/dL Final    Protein Total 03/27/2025 6.8  6.0 - 8.4 gm/dL Final    Albumin 03/27/2025 3.8  3.5 - 5.2 g/dL Final    Bilirubin Total 03/27/2025 0.2  0.1 - 1.0 mg/dL Final    For infants and newborns, interpretation of results should be based   on gestational age, weight and in agreement with clinical   observations.    Premature Infant recommended reference ranges:   0-24 hours:  <8.0 mg/dL   24-48 hours: <12.0 mg/dL   3-5 days:    <15.0 mg/dL   6-29 days:   <15.0 mg/dL    ALP 03/27/2025 102  40 - 150 unit/L Final    AST 03/27/2025 13  11 - 45 unit/L Final    ALT 03/27/2025 12  10 - 44 unit/L Final    Anion Gap 03/27/2025 11  8 - 16 mmol/L Final    eGFR 03/27/2025 >60  >60 mL/min/1.73/m2 Final    Estimated GFR calculated using the CKD-EPI creatinine (2021) equation.    WBC 03/27/2025 7.13  3.90 - 12.70 K/uL Final    RBC 03/27/2025 3.84 (L)  4.00 - 5.40 M/uL Final    HGB 03/27/2025 10.8 (L)  12.0 - 16.0 gm/dL Final    HCT 03/27/2025 34.8 (L)  37.0 - 48.5 % Final    MCV 03/27/2025 91  82 - 98 fL Final    MCH 03/27/2025 28.1  27.0 - 50.0 pg Final    MCHC 03/27/2025 31.0 (L)  32.0 - 36.0 g/dL Final    RDW 03/27/2025 16.1 (H)  11.5 - 14.5 % Final    Platelet Count 03/27/2025 213  150 - 450 K/uL Final    MPV 03/27/2025 8.9 (L)  9.2 - 12.9 fL Final    Nucleated RBC 03/27/2025 0  <=0 /100 WBC Final    Neut % 03/27/2025 63.9  38 - 73 % Final    Lymph % 03/27/2025 23.6  18 - 48 % Final    Mono % 03/27/2025 9.1  4 - 15 % Final    Eos % 03/27/2025 2.4  <=8 % Final    Basophil % 03/27/2025 0.7  <=1.9 % Final    Imm Grans % 03/27/2025 0.3  0.0 - 0.5 % Final    Neut # 03/27/2025 4.56  1.8 - 7.7 K/uL Final    Lymph # 03/27/2025 1.68  1 - 4.8 K/uL Final    Mono # 03/27/2025 0.65  0.3 - 1 K/uL Final    Eos #  03/27/2025 0.17  <=0.5 K/uL Final    Baso # 03/27/2025 0.05  <=0.2 K/uL Final    Imm Grans # 03/27/2025 0.02  0.00 - 0.04 K/uL Final    Mild elevation in immature granulocytes is non specific and can be seen in a variety of conditions including stress response, acute inflammation, trauma and pregnancy. Correlation with other laboratory and clinical findings is essential.         Imaging:     MRI Brain 3/07/25  Gray matter distinction is preserved throughout the brain parenchyma. The ventricles are midline without mass effect. There is mild periventricular and deep white matter FLAIR and T2 hyperintensities likely reflecting changes of chronic microvascular ischemia. No intra-axial hemorrhage or restricted diffusion. No intra-axial fluid collection or mass. Bone marrow signal of the calvarium is within normal limits. Major vascular flow voids are within normal limits. The orbits globes and optic nerves are grossly unremarkable. Paranasal sinuses are unremarkable.    PET/CT 3/10/25  Quality of the study: Adequate.     There is increased FDG avidity within a posterior right upper lobe pulmonary nodule measuring 2.0 cm on transmission CT images. This shows max SUV of 3.6 on today's exam versus 1.8 on prior exam.     No other abnormal regions of FDG uptake are identified.     Physiologic uptake of the tracer is present within the brain, salivary glands, myocardium, GI and  tracts.     Incidental CT findings: N/A       Assessment:       1. Malignant neoplasm of unspecified part of unspecified bronchus or lung    2. Arachnoid membrane inflammation    3. Hypertension, unspecified type    4. MEN1 (multiple endocrine neoplasia)    5. Hypothyroidism, unspecified type    6. Pancreatic insufficiency               Plan:     NSCLC - Pt with stage IIIa pT1cN2 NSCLC SCC of the RLL with met to station 7 LN  -TEMPUS tissue testing showed mutations in CDKN2A, FAT1, and TP53.  PD-L1 was <1%  -MRI brain KRISHNA on 3/07/25  -Plan to start  pt on Carboplatin and Paclitaxel with XRT followed by year treatment with Durvalumab  -Pt to attend chemo class and to be set up for chemo care companion  -Pt to see general surgery for PORT  Visit today included increased complexity associated with the care of the episodic problem (chemotherapy) addressed and managing the longitudinal care of the patient due to the serious and/or complex managed problem(s) NSCLC.    Tin Tuesday of next week.  Dr Freddy Burch.       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 Pt needs approval for chemo.  Pt needs chemo class ASAP and to be set up for chemo care companion.  Pt needs a CBC and CMP with appt with me the day she starts treatment.  Treatmetn needs to be coordianted with radiation.   Follow up with [facility]    Infusion scheduling note    Injection scheduling note    Labs    Imaging    Pharmacy appointment    Other referrals                Treatment Plan Information   OP NSCLC PACLitaxel  CARBOplatin QW + radiation Sea William MD   Associated diagnosis: Cancer of lower lobe of right lung  cT1c, cN2(f), cM0 noted on 3/14/2025   Line of treatment: Adjuvant  Treatment Goal: Curative     Upcoming Treatment Dates - OP NSCLC PACLitaxel CARBOplatin QW + radiation    5/5/2025       Pre-Medications       diphenhydrAMINE (BENADRYL) 50 mg in NS 50 mL IVPB       famotidine (PF) injection 20 mg       Chemotherapy       PACLitaxeL (TAXOL) 45 mg/m2 = 96 mg in 0.9% NaCl 250 mL chemo infusion       CARBOplatin (PARAPLATIN) in 0.9% NaCl 250 mL chemo infusion       Antiemetics       palonosetron 0.25mg/dexAMETHasone 12mg in NS IVPB 0.25 mg 50 mL  5/12/2025       Pre-Medications       diphenhydrAMINE (BENADRYL) 50 mg in NS 50 mL IVPB       famotidine (PF) injection 20 mg       Chemotherapy       PACLitaxeL (TAXOL) 45 mg/m2 = 96 mg in 0.9% NaCl 250 mL chemo infusion       CARBOplatin (PARAPLATIN) in 0.9% NaCl 250 mL chemo infusion       Antiemetics       palonosetron 0.25mg/dexAMETHasone 12mg in NS IVPB 0.25 mg 50 mL  5/19/2025       Pre-Medications       diphenhydrAMINE (BENADRYL) 50 mg in NS 50 mL IVPB       famotidine (PF) injection 20 mg       Chemotherapy       PACLitaxeL (TAXOL) 45 mg/m2 = 96 mg in 0.9% NaCl 250 mL chemo infusion       CARBOplatin (PARAPLATIN) in 0.9% NaCl 250 mL chemo infusion       Antiemetics       palonosetron 0.25mg/dexAMETHasone 12mg in NS IVPB 0.25 mg 50 mL  5/26/2025       Pre-Medications       diphenhydrAMINE (BENADRYL) 50 mg in NS 50 mL IVPB       famotidine (PF) injection 20 mg       Chemotherapy       PACLitaxeL (TAXOL) 45 mg/m2 = 96 mg in 0.9% NaCl 250 mL chemo infusion       CARBOplatin (PARAPLATIN) in 0.9% NaCl 250 mL chemo infusion       Antiemetics       palonosetron 0.25mg/dexAMETHasone 12mg in NS IVPB 0.25 mg 50 mL      Sea William MD  [facility]  Hematology and Oncology  [facility] [address] [place] [address]   O: [phone]   F: [phone]                 [1]   Current Outpatient Medications   Medication Sig Dispense Refill    acetaminophen (TYLENOL) 325 MG tablet Take 2 tablets (650 mg total) by mouth every 6 (six) hours as needed for Pain (Do not take with any other tylenol containing products).  0    allopurinoL (ZYLOPRIM) 300 MG tablet TAKE 1 TABLET DAILY 90 tablet 1    amLODIPine (NORVASC) 5 MG tablet TAKE 1 TABLET(5 MG) BY MOUTH TWICE DAILY 180 tablet 1    busPIRone (BUSPAR) 5 MG Tab Take 1 tablet (5 mg total) by mouth 2 (two) times daily. 180 tablet 3    clopidogreL (PLAVIX) 75 mg tablet Take 1 tablet (75 mg total) by mouth once daily. 90 tablet 3    colestipoL (COLESTID) 1 gram Tab TAKE 2 TABLETS ONCE DAILY 180 tablet 1    cyanocobalamin 1,000 mcg/mL injection INJECT 1 ML IN THE MUSCLE EVERY 14 DAYS 10 mL 0    EScitalopram oxalate (LEXAPRO) 20 MG tablet TAKE 1 TABLET EVERY EVENING 90 tablet 3    fluticasone propionate (FLONASE) 50 mcg/actuation nasal spray 2 sprays (100 mcg total) by Each Nostril route 2 (two) times daily. 48 g 3    furosemide (LASIX) 20 MG tablet TAKE 1 TABLET(20 MG) BY MOUTH EVERY DAY 90 tablet 3    gabapentin (NEURONTIN) 400 MG capsule Take 1 capsule (400 mg total) by mouth 2 (two) times daily. 180 capsule 3    hydrALAZINE (APRESOLINE) 25 MG tablet Take 1 tablet (25 mg total) by mouth daily as needed (Check BP after taking amlodipine, Lasix, metoprolol.  If it continues to stay greater than 160/100 take hydralazine). 30 tablet 0    levothyroxine (SYNTHROID) 75 MCG tablet TAKE 1 TABLET DAILY 90 tablet 3    magnesium oxide (MAG-OX) 400 mg (241.3 mg magnesium) tablet Take 1 tablet (400 mg total) by mouth once daily.      methocarbamoL (ROBAXIN) 750 MG Tab Take 1 tablet (750 mg total) by mouth every 8 (eight) hours as needed (muscle spasm). 270 tablet 3    metoprolol succinate (TOPROL-XL) 50 MG 24 hr tablet Take 1 tablet (50 mg total) by mouth once daily. Pt takes 1/2 tab daily 90 tablet 2     oxyCODONE-acetaminophen (PERCOCET) 5-325 mg per tablet Take 1 tablet by mouth every 8 (eight) hours as needed.      pantoprazole (PROTONIX) 40 MG tablet TAKE 1 TABLET DAILY 90 tablet 3    potassium chloride SA (K-DUR,KLOR-CON M) 10 MEQ tablet Take 1 tablet (10 mEq total) by mouth once daily. 90 tablet 3    rosuvastatin (CRESTOR) 10 MG tablet TAKE 1 TABLET DAILY 90 tablet 3    traZODone (DESYREL) 100 MG tablet TAKE 1 TABLET EVERY EVENING 90 tablet 3    vit A/vit C/vit E/zinc/copper (PRESERVISION AREDS ORAL) Take 1 capsule by mouth 2 (two) times a day.      vitamin D (VITAMIN D3) 1000 units Tab Take 1,000 Units by mouth 2 (two) times a day.      ZENPEP 40,000-126,000- 168,000 unit CpDR Take 1 capsule by mouth once daily.       No current facility-administered medications for this visit.     Facility-Administered Medications Ordered in Other Visits   Medication Dose Route Frequency Provider Last Rate Last Admin    oxyCODONE immediate release tablet 5 mg  5 mg Oral Once PRN Paul Nunez MD

## 2025-04-21 NOTE — PLAN OF CARE
START ON PATHWAY REGIMEN - Non-Small Cell Lung    [id number]        Paclitaxel       Carboplatin           Additional Orders: Chemotherapy is given concurrently with radiation.    **Always confirm dose/schedule in your pharmacy ordering system**    Patient Characteristics:  Preoperative or Nonsurgical Candidate (Clinical Staging), Stage IIB (N2a only)   or Stage III - Nonsurgical Candidate, PS = 0,1  Therapeutic Status: Preoperative or Nonsurgical Candidate (Clinical Staging)  AJCC T Category: cTX  AJCC N Category: cNX  AJCC M Category: cM0  AJCC 9 Stage Grouping: IIIA  Check here if patient was staged using an edition other than AJCC Staging 9th   Edition: false  ECOG Performance Status: 1  Intent of Therapy:  Curative Intent, Discussed with Patient

## 2025-04-22 ENCOUNTER — TELEPHONE (OUTPATIENT)
Dept: HEMATOLOGY/ONCOLOGY | Facility: CLINIC | Age: 70
End: 2025-04-22
Payer: MEDICARE

## 2025-04-22 DIAGNOSIS — C34.90 MALIGNANT NEOPLASM OF UNSPECIFIED PART OF UNSPECIFIED BRONCHUS OR LUNG: Primary | ICD-10-CM

## 2025-04-22 NOTE — NURSING
Spoke with patient to schedule chemo school for her new treatment plan of concurrent chemorads with taxol/carbo followed by a year of imfinzi.  Patient accepted chemo school appt of Monday, 4/28, at 1400.   Patient states she may need surgery on her spine prior to starting treatment.   Patient also asked about a port consult appt regarding port placement. Will reach out to Dr. William regarding port consult.

## 2025-04-23 ENCOUNTER — TELEPHONE (OUTPATIENT)
Dept: HEMATOLOGY/ONCOLOGY | Facility: CLINIC | Age: 70
End: 2025-04-23
Payer: MEDICARE

## 2025-04-23 DIAGNOSIS — M79.89 RIGHT LEG SWELLING: Primary | ICD-10-CM

## 2025-04-23 DIAGNOSIS — R60.0 EDEMA OF RIGHT LOWER EXTREMITY: Primary | ICD-10-CM

## 2025-04-23 DIAGNOSIS — R60.9 EDEMA, UNSPECIFIED TYPE: ICD-10-CM

## 2025-04-23 NOTE — TELEPHONE ENCOUNTER
----- Message from PerioSeal sent at 4/23/2025  8:28 AM CDT -----  Type: Needs Medical AdviceWho Called:  Yasmine Call Back Number: [phone] Information: nithya states he leg is swollen and she needs to talk to nurse , please call to further assist , thank you .

## 2025-04-23 NOTE — TELEPHONE ENCOUNTER
Patient c/o swelling right lower ext with some discoloration, started last night , she thought it might be fluid so took lasix and when she woke up this morning it was still swollen and now with numbness and tingling in foot  Advised with Dr William who did say we could get u/s and have her come in to see Np . When I called back to advise patient states she is now having numbness tingling in right arm and wants to go to Er for evaluation. Advised that is a good plan. Asked patient to keep us updated.

## 2025-04-24 ENCOUNTER — TELEPHONE (OUTPATIENT)
Dept: HEMATOLOGY/ONCOLOGY | Facility: CLINIC | Age: 70
End: 2025-04-24
Payer: MEDICARE

## 2025-04-24 ENCOUNTER — TELEPHONE (OUTPATIENT)
Dept: NEUROSURGERY | Facility: CLINIC | Age: 70
End: 2025-04-24
Payer: MEDICARE

## 2025-04-24 NOTE — TELEPHONE ENCOUNTER
----- Message from Alberta sent at 4/24/2025 11:14 AM CDT -----  Type: Needs Medical AdviceWho Called:  Patient Symptoms (please be specific):  How long has patient had these symptoms: Pharmacy name and phone #: [facility] Call Back Number: [phone] Information: Patient is requesting a call back from Lisa regarding her back pain.

## 2025-04-24 NOTE — TELEPHONE ENCOUNTER
----- Message from Denny sent at 4/24/2025  8:33 AM CDT -----  Type: Needs Medical AdviceWho Called:  pt Symptoms (please be specific):  How long has patient had these symptoms:  Pharmacy name and phone #:  [facility] Call Back Number: [phone] Information: pt is requesting a call back to reschedule appt today 4/24

## 2025-04-28 ENCOUNTER — DOCUMENTATION ONLY (OUTPATIENT)
Dept: INFUSION THERAPY | Facility: HOSPITAL | Age: 70
End: 2025-04-28
Payer: MEDICARE

## 2025-04-28 ENCOUNTER — CLINICAL SUPPORT (OUTPATIENT)
Dept: HEMATOLOGY/ONCOLOGY | Facility: CLINIC | Age: 70
End: 2025-04-28
Payer: MEDICARE

## 2025-04-28 VITALS
HEIGHT: 67 IN | OXYGEN SATURATION: 99 % | SYSTOLIC BLOOD PRESSURE: 126 MMHG | HEART RATE: 65 BPM | DIASTOLIC BLOOD PRESSURE: 75 MMHG | WEIGHT: 195.75 LBS | TEMPERATURE: 98 F | RESPIRATION RATE: 16 BRPM | BODY MASS INDEX: 30.72 KG/M2

## 2025-04-28 DIAGNOSIS — Z95.828 PORT-A-CATH IN PLACE: ICD-10-CM

## 2025-04-28 DIAGNOSIS — C34.31 CANCER OF LOWER LOBE OF RIGHT LUNG: Primary | ICD-10-CM

## 2025-04-28 PROCEDURE — 99999 PR PBB SHADOW E&M-EST. PATIENT-LVL IV: CPT | Mod: PBBFAC,,,

## 2025-04-28 PROCEDURE — 99214 OFFICE O/P EST MOD 30 MIN: CPT | Mod: PBBFAC,PN

## 2025-04-28 PROCEDURE — 99215 OFFICE O/P EST HI 40 MIN: CPT | Mod: S$PBB,,,

## 2025-04-28 RX ORDER — OLANZAPINE 5 MG/1
5 TABLET, FILM COATED ORAL NIGHTLY
Qty: 12 TABLET | Refills: 1 | Status: SHIPPED | OUTPATIENT
Start: 2025-04-28

## 2025-04-28 RX ORDER — LIDOCAINE AND PRILOCAINE 25; 25 MG/G; MG/G
CREAM TOPICAL
Qty: 30 G | Refills: 2 | Status: SHIPPED | OUTPATIENT
Start: 2025-04-28

## 2025-04-28 RX ORDER — ONDANSETRON HYDROCHLORIDE 8 MG/1
8 TABLET, FILM COATED ORAL EVERY 8 HOURS PRN
Qty: 30 TABLET | Refills: 2 | Status: SHIPPED | OUTPATIENT
Start: 2025-04-28

## 2025-04-28 RX ORDER — METHYLPREDNISOLONE 4 MG/1
4 TABLET ORAL
COMMUNITY
Start: 2025-04-21

## 2025-04-28 NOTE — PROGRESS NOTES
SUBHAW and JOSE met with Ms. Aggarwal for chemo education. Ms. Nelson is a single 68 y/o retired woman who lives in [place], [place]. She will be traveling to the [facility] for radiation and chemotherapy for 6 weeks, with immunotherapy likely following. Ms. Nelson qualifies for the support enrollment program, which will provide her with gas cards. SOCORRO educated the patient on this program, letting her know she will receive 2 gas cards per week during radiation and 1 per week when receiving only chemo or immunotherapy. She is scheduled to have a back surgery soon, so her infusions have not been scheduled as of today. She will receive a port and was given a port pillow today. She shared that she has plenty support from friends, with one going to stay at her home to accompany her to appointments this week. She lives alone, except for her dog, Claire Witt. Ms. Nelson stated that she loves to cook but has had numerous gut issues that limit what she can eat. She shared that she is eager to start her treatment and has been busy educating herself on the effects of chemo and radiation.   SOCORRO gave the patient contact number and encouraged her to reach out if any issues arise. SOCORRO will follow that patient throughout treatment.

## 2025-04-28 NOTE — PROGRESS NOTES
Oncology Nutrition   New Patient Education  Alessia Nelson   1955    Nutrition Education   This is a 69 y.o.female with a medical diagnosis of cancer of LL of R lung.     Met w/ pt to discuss current nutritional status and nutrition as it relates to cancer and cancer treatment. Pt currently low nutritional risk. However pt has significant history of intolerance to certain foods, pancreatic insufficiency, chronic diarrhea and h/o bowel resection > 30 years ago. She reports she used to be on Zenpep (pancrelipase) but the insurance stopped covering it. She has good appetite and has learned her body well. She knows when diarrhea begins she has to take her anti diarrhea medications and if nausea begins she takes Zofran. Otherwise she will vomit until she gets dehydrated. She does not have good hydration habits. We discussed carrying water bottle around and sipping throughout the day. She has one that she can carry around her neck she said.  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 Cancer treatment will be concurrent chem/xrt.   JOSE and Araceli QUINTANILLA provided tour of infusion center.     Wt Readings from Last 10 Encounters:   04/28/25 88.8 kg (195 lb 12.3 oz)   04/23/25 91 kg (200 lb 9.9 oz)   04/21/25 91.4 kg (201 lb 8 oz)   04/21/25 89.7 kg (197 lb 12 oz)   04/15/25 89.7 kg (197 lb 12 oz)   03/27/25 87.8 kg (193 lb 9 oz)   03/14/25 87.9 kg (193 lb 12.6 oz)   02/20/25 86.1 kg (189 lb 13.1 oz)   11/06/24 89.3 kg (196 lb 12.2 oz)   11/06/24 88.5 kg (195 lb 1.6 oz)      [x] PMHx reviewed  [x] Labs reviewed    Educated on food safety and common nutrition impact symptoms associated with chemotherapy treatment. Focus on adequate protein and energy intake and no fasting or dieting. Good, quality food choices are important to build immune system. Foods should be plant based and whole foods vs processed foods and empty calories.   Reinforced the importance of good hydration.  Provided Medline water bottle and suggested consume ~40-42oz/day of water.  Handouts provided.    Answered all nutrition related questions.     Patient provided with dietitian contact number and advised to call with questions or make future appointment if further intervention is needed. RD to follow throughout tx prn.    Shavonne Keller, JOSEN, LDN, [facility]  04/28/2025  4:35 PM

## 2025-04-28 NOTE — ASSESSMENT & PLAN NOTE
Chemotherapy school   Treatment plan of OP NSCLC PACLitaxel CARBOplatin QW + radiation followed by Durvalumab discussed with patient. Reviewed duration of treatment and schedule with patient.   Handouts provided on chemotherapy agents. Premeds, supportive meds explained.  Discussed the mechanism of action, potential side effects of this treatment as well as ways to manage them at home.   Dietary modifications discussed. Detailed instructions to be provided by dietician.   Chemotherapy folder supplied with contact information and additional educational material.   Discussed follow-up with the physician for toxicity monitoring throughout treatment.    Cycle 1, Day 1 to be scheduled scheduled; home prescriptions sent to patient's preferred pharmacy   Informed consent obtained and documented electronically.   Chemotherapy care companion enrollment: status onboarding  Encouraged to call office - phone number provided - to assist with any concerns.

## 2025-04-28 NOTE — PROGRESS NOTES
Subjective    Chief complaint: Chemotherapy school   Referring provider: Sea William MD    History of present Illness:  Alessia Nelson is a 69 y.o. female here today for education prior to starting treatment for lung cancer. Their oncologist, Dr. William, recommended treatment with OP NSCLC PACLitaxel CARBOplatin QW + radiation followed by Durvalumab and the patient has agreed to proceed.      Oncology History   Cancer of lower lobe of right lung   3/14/2025 Initial Diagnosis    Cancer of lower lobe of right lung     3/14/2025 Cancer Staged    Staging form: Lung, AJCC V9  - Clinical stage from 3/14/2025: cT1c, cN2(f), cM0     5/5/2025 -  Chemotherapy    Treatment Summary   Plan Name: OP NSCLC PACLitaxel CARBOplatin QW + radiation  Treatment Goal: Curative  Status: Active  Start Date: 5/5/2025 (Planned)  End Date: 6/16/2025 (Planned)  Provider: Sea William MD  Chemotherapy: CARBOplatin (PARAPLATIN) in 0.9% NaCl 250 mL chemo infusion, , Intravenous, Clinic/HOD 1 time, 0 of 7 cycles  PACLitaxeL (TAXOL) 45 mg/m2 = 96 mg in 0.9% NaCl 250 mL chemo infusion, 45 mg/m2 = 96 mg, Intravenous, Clinic/HOD 1 time, 0 of 7 cycles          Review of Systems   Constitutional:  Negative for appetite change, chills, fatigue, fever and unexpected weight change.   HENT:  Negative for congestion, mouth sores, nosebleeds and sore throat.    Respiratory:  Negative for cough and shortness of breath.    Cardiovascular:  Negative for chest pain and leg swelling.   Gastrointestinal:  Negative for abdominal pain, constipation, diarrhea, nausea and vomiting.   Genitourinary:  Negative for difficulty urinating, flank pain and frequency.   Musculoskeletal:  Negative for arthralgias, gait problem, joint swelling and myalgias.   Skin:  Negative for rash and wound.   Neurological:  Negative for dizziness, speech difficulty, weakness, numbness and headaches.   Hematological:  Negative for adenopathy. Does not bruise/bleed easily.     "    Review of patient's allergies indicates:   Allergen Reactions    Aspirin Other (See Comments)     "Makes stomach feel like it's on fire"    Phenergan [promethazine] Other (See Comments)     SEIZURES    Nickel     Lortab [hydrocodone-acetaminophen] Itching     Able to take generic Norco        Current Medications[1]     Past Medical History[2]     Past Surgical History[3]     Social History[4]     Family History   Problem Relation Name Age of Onset    Stroke Maternal Grandmother      Cancer Maternal Grandfather      Stroke Mother      Heart disease Father      Breast cancer Sister       ECOG SCORE    0 - Fully active-able to carry on all pre-disease performance without restriction           Objective    /75   Pulse 65   Temp 98 °F (36.7 °C) (Temporal)   Resp 16   Ht 5' 7" (1.702 m)   Wt 88.8 kg (195 lb 12.3 oz)   SpO2 99%   BMI 30.66 kg/m²     Physical Exam  Vitals reviewed.   Constitutional:       Appearance: Normal appearance.   Neurological:      General: No focal deficit present.      Mental Status: She is alert and oriented to person, place, and time. Mental status is at baseline.   Psychiatric:         Mood and Affect: Mood normal.         Behavior: Behavior normal.            Assessment & Plan  Cancer of lower lobe of right lung  Chemotherapy school   Treatment plan of OP NSCLC PACLitaxel CARBOplatin QW + radiation followed by Durvalumab discussed with patient. Reviewed duration of treatment and schedule with patient.   Handouts provided on chemotherapy agents. Premeds, supportive meds explained.  Discussed the mechanism of action, potential side effects of this treatment as well as ways to manage them at home.   Dietary modifications discussed. Detailed instructions to be provided by dietician.   Chemotherapy folder supplied with contact information and additional educational material.   Discussed follow-up with the physician for toxicity monitoring throughout treatment.    Cycle 1, Day 1 to " be scheduled scheduled; home prescriptions sent to patient's preferred pharmacy   Informed consent obtained and documented electronically.   Chemotherapy care companion enrollment: status onboarding  Encouraged to call office - phone number provided - to assist with any concerns.   Port-A-Cath in place  EMLA cream ordered for comfort with port access.        Orders Placed This Encounter    Physician communication order    Physician communication order    OLANZapine (ZYPREXA) 5 MG tablet    ondansetron (ZOFRAN) 8 MG tablet    LIDOcaine-prilocaine (EMLA) cream        60 minutes were spent in coordination of patient's care, record review and counseling.     RAQUEL Daugherty          [1]   Current Outpatient Medications   Medication Sig Dispense Refill    acetaminophen (TYLENOL) 325 MG tablet Take 2 tablets (650 mg total) by mouth every 6 (six) hours as needed for Pain (Do not take with any other tylenol containing products).  0    allopurinoL (ZYLOPRIM) 300 MG tablet TAKE 1 TABLET DAILY 90 tablet 1    amLODIPine (NORVASC) 5 MG tablet TAKE 1 TABLET(5 MG) BY MOUTH TWICE DAILY 180 tablet 1    busPIRone (BUSPAR) 5 MG Tab Take 1 tablet (5 mg total) by mouth 2 (two) times daily. 180 tablet 3    cephALEXin (KEFLEX) 500 MG capsule Take 1 capsule (500 mg total) by mouth 4 (four) times daily. for 7 days 28 capsule 0    clopidogreL (PLAVIX) 75 mg tablet Take 1 tablet (75 mg total) by mouth once daily. 90 tablet 3    colestipoL (COLESTID) 1 gram Tab TAKE 2 TABLETS ONCE DAILY 180 tablet 1    cyanocobalamin 1,000 mcg/mL injection INJECT 1 ML IN THE MUSCLE EVERY 14 DAYS 10 mL 0    EScitalopram oxalate (LEXAPRO) 20 MG tablet TAKE 1 TABLET EVERY EVENING 90 tablet 3    fluticasone propionate (FLONASE) 50 mcg/actuation nasal spray 2 sprays (100 mcg total) by Each Nostril route 2 (two) times daily. 48 g 3    furosemide (LASIX) 20 MG tablet TAKE 1 TABLET(20 MG) BY MOUTH EVERY DAY 90 tablet 3    gabapentin (NEURONTIN) 400 MG capsule Take  1 capsule (400 mg total) by mouth 2 (two) times daily. 180 capsule 3    hydrALAZINE (APRESOLINE) 25 MG tablet Take 1 tablet (25 mg total) by mouth daily as needed (Check BP after taking amlodipine, Lasix, metoprolol.  If it continues to stay greater than 160/100 take hydralazine). 30 tablet 0    levothyroxine (SYNTHROID) 75 MCG tablet TAKE 1 TABLET DAILY 90 tablet 3    magnesium oxide (MAG-OX) 400 mg (241.3 mg magnesium) tablet Take 1 tablet (400 mg total) by mouth once daily.      methocarbamoL (ROBAXIN) 750 MG Tab Take 1 tablet (750 mg total) by mouth every 8 (eight) hours as needed (muscle spasm). 270 tablet 3    methylPREDNISolone (MEDROL DOSEPACK) 4 mg tablet Take by mouth.      metoprolol succinate (TOPROL-XL) 50 MG 24 hr tablet Take 1 tablet (50 mg total) by mouth once daily. Pt takes 1/2 tab daily 90 tablet 2    oxyCODONE-acetaminophen (PERCOCET) 5-325 mg per tablet Take 1 tablet by mouth every 8 (eight) hours as needed.      pantoprazole (PROTONIX) 40 MG tablet TAKE 1 TABLET DAILY 90 tablet 3    potassium chloride SA (K-DUR,KLOR-CON M) 10 MEQ tablet Take 1 tablet (10 mEq total) by mouth once daily. 90 tablet 3    rosuvastatin (CRESTOR) 10 MG tablet TAKE 1 TABLET DAILY 90 tablet 3    traZODone (DESYREL) 100 MG tablet TAKE 1 TABLET EVERY EVENING 90 tablet 3    vit A/vit C/vit E/zinc/copper (PRESERVISION AREDS ORAL) Take 1 capsule by mouth 2 (two) times a day.      vitamin D (VITAMIN D3) 1000 units Tab Take 1,000 Units by mouth 2 (two) times a day.      ZENPEP 40,000-126,000- 168,000 unit CpDR Take 1 capsule by mouth once daily.      LIDOcaine-prilocaine (EMLA) cream Apply topically as needed (Apply 30 minutes prior to port access). 30 g 2    OLANZapine (ZYPREXA) 5 MG tablet Take 1 tablet (5 mg total) by mouth every evening. On days 1-3 of each chemotherapy cycle. 12 tablet 1    ondansetron (ZOFRAN) 8 MG tablet Take 1 tablet (8 mg total) by mouth every 8 (eight) hours as needed for Nausea. 30 tablet 2     No  current facility-administered medications for this visit.     Facility-Administered Medications Ordered in Other Visits   Medication Dose Route Frequency Provider Last Rate Last Admin    oxyCODONE immediate release tablet 5 mg  5 mg Oral Once PRN Paul Nunez MD       [2]   Past Medical History:  Diagnosis Date    Acute pancreatitis     Back pain     CAD (coronary artery disease)     CHF (congestive heart failure)     COPD (chronic obstructive pulmonary disease)     Depression     Diverticulosis     Encounter for blood transfusion     GERD (gastroesophageal reflux disease)     History of blood clots     1986 AFTER BOWEL RESCETION    History of bowel resection     Hyperlipidemia     Hypertension     Peritonitis     PONV (postoperative nausea and vomiting)     Pulmonary nodule     Seizures     HAD A SEIZURE FROM PHENERGAN     Stroke     left sided weakness    Thyroid disease     TIA (transient ischemic attack)     Wears dentures     upper   [3]   Past Surgical History:  Procedure Laterality Date    ADRENAL GLAND SURGERY      APPENDECTOMY      BACK SURGERY      CAUDAL EPIDURAL STEROID INJECTION N/A 03/01/2021    Procedure: Injection-steroid-epidural-caudal;  Surgeon: Socorro Lorenz MD;  Location: [facility] OR;  Service: Pain Management;  Laterality: N/A;    CHOLECYSTECTOMY      COLONOSCOPY      COLONOSCOPY N/A 06/10/2021    Procedure: COLONOSCOPY;  Surgeon: Sheree Metz MD;  Location: [facility] ENDO;  Service: Endoscopy;  Laterality: N/A;    COLONOSCOPY N/A 03/08/2022    Procedure: COLONOSCOPY;  Surgeon: Maurilio Rodriguez MD;  Location: [facility] ([address] [place] [address]);  Service: Endoscopy;  Laterality: N/A;  Pt to perform at-home COVID test morning of procedure-DS  2/24-Blood thinner approval rec'[facility] Dr. Walsh (see letters tab 2/24/22)-DS  3/2-Instructions sent via email-DS  3/7-Pt unable to come earlier due to ride-DS    CORONARY STENT PLACEMENT      CYSTOURETHROSCOPY N/A 11/13/2019    Procedure: CYSTOURETHROSCOPY;  Surgeon:  Shun Nuñez MD;  Location: [facility] OR;  Service: Urology;  Laterality: N/A;    DIRECT DIAGNOSTIC LARYNGOSCOPY WITH BRONCHOSCOPY AND ESOPHAGOSCOPY N/A 01/03/2024    Procedure: LARYNGOSCOPY, DIRECT, DIAGNOSTIC, WITH BRONCHOSCOPY AND ESOPHAGOSCOPY;  Surgeon: Mir Belle MD;  Location: [facility] OR;  Service: ENT;  Laterality: N/A;    ENDOBRONCHIAL ULTRASOUND Bilateral 2/20/2025    Procedure: ENDOBRONCHIAL ULTRASOUND (EBUS);  Surgeon: Ismael Cooper MD;  Location: [facility] OR;  Service: Pulmonary;  Laterality: Bilateral;    ENDOSCOPIC ULTRASOUND OF LOWER GASTROINTESTINAL TRACT N/A 03/08/2022    Procedure: ULTRASOUND, LOWER GI TRACT, ENDOSCOPIC;  Surgeon: Maurilio Rodriguez MD;  Location: [facility] (2ND FLR);  Service: Endoscopy;  Laterality: N/A;  Pt to perform at-home COVID test morning of procedure-DS  2/24-Blood thinner approval rec'[facility] Dr. Walsh (see letters tab 2/24/22)-DS  3/2-Instructions sent via email-DS  3/7-Pt unable to come earlier due to ride-DS    ENDOSCOPIC ULTRASOUND OF UPPER GASTROINTESTINAL TRACT N/A 11/25/2019    Procedure: ULTRASOUND, UPPER GI TRACT, ENDOSCOPIC;  Surgeon: Alhaji Bridges MD;  Location: [facility] (2ND FLR);  Service: Endoscopy;  Laterality: N/A;  5 day hold Plavix, Dr Jovanni Serrano - pg  PM prep    ENDOSCOPIC ULTRASOUND OF UPPER GASTROINTESTINAL TRACT N/A 11/02/2020    Procedure: ULTRASOUND, UPPER GI TRACT, ENDOSCOPIC;  Surgeon: Maurilio Rodriguez MD;  Location: [facility] (2ND FLR);  Service: Endoscopy;  Laterality: N/A;  5 day hold Plavix, Dr Roman Walsh - pg  Covid-19 test 10/30/20 at [facility]    EPIDURAL STEROID INJECTION INTO CERVICAL SPINE N/A 12/08/2021    Procedure: Injection-steroid-epidural-cervical;  Surgeon: Socorro Lorenz MD;  Location: [facility] OR;  Service: Pain Management;  Laterality: N/A;    EPIDURAL STEROID INJECTION INTO CERVICAL SPINE N/A 11/30/2023    Procedure: Injection-steroid-epidural-cervical;  Surgeon: Maurilio Carroll MD;  Location: [facility] OR;  Service:  Anesthesiology;  Laterality: N/A;  c7-T1    ESOPHAGOGASTRODUODENOSCOPY N/A 06/10/2021    Procedure: EGD (ESOPHAGOGASTRODUODENOSCOPY);  Surgeon: Sheree Metz MD;  Location: [facility];  Service: Endoscopy;  Laterality: N/A;    ESOPHAGOGASTRODUODENOSCOPY N/A 09/26/2024    Procedure: EGD (ESOPHAGOGASTRODUODENOSCOPY);  Surgeon: Jovanny Saldivar MD;  Location: [facility] ENDO;  Service: Endoscopy;  Laterality: N/A;    ESOPHAGOSCOPY, USING BOUGIE, WITH DILATION N/A 01/03/2024    Procedure: ESOPHAGOSCOPY, USING BOUGIE, WITH DILATION;  Surgeon: Mir Belle MD;  Location: [facility];  Service: ENT;  Laterality: N/A;    FRACTURE SURGERY Left 05/02/2022    ankle    HERNIA REPAIR      HYSTERECTOMY      INCISIONAL HERNIA REPAIR      INJECTION OF ANESTHETIC AGENT AROUND NERVE Right 05/27/2019    Procedure: RIGHT L5-S3 MEDIAL BRANCH BLOCKS;  Surgeon: Sneha Aragon MD;  Location: [facility] OR;  Service: Pain Management;  Laterality: Right;  **DO NOT STOP PLAVIX**    INJECTION OF JOINT Right 09/22/2021    Procedure: Injection, Joint; Right SI Joint Injection;  Surgeon: Socorro Lorenz MD;  Location: [facility];  Service: Pain Management;  Laterality: Right;  Oral    INSERTION OF DORSAL COLUMN NERVE STIMULATOR FOR TRIAL N/A 06/07/2021    Procedure: INSERTION, NEUROSTIMULATOR, SPINAL CORD, DORSAL COLUMN, FOR TRIAL;  Surgeon: Socorro Lorenz MD;  Location: [facility] OR;  Service: Pain Management;  Laterality: N/A;  nevro rep confirmed start time    instestine      bowel section    KNEE ARTHROPLASTY Right 12/18/2019    Procedure: ARTHROPLASTY, KNEE;  Surgeon: Ike Briceño II, MD;  Location: [facility] OR;  Service: Orthopedics;  Laterality: Right;    KNEE SURGERY  1983    OOPHORECTOMY      PARATHYROID GLAND SURGERY      3 surgeries    RADIOFREQUENCY ABLATION Right 06/10/2019    Procedure: Radiofrequency Ablation - RIGHT L3-5 RADIOFREQUENCY ABLATION WITH HALYARD COOLIEF THERMAL SYSTEM;  Surgeon: Sneha Aragon MD;  Location: [facility];   Service: Pain Management;  Laterality: Right;  **HOLD PLAVIX x 7 DAYS PRIOR**    REVISION OF KNEE ARTHROPLASTY Right     AVALA with Dr Singh    ROBOTIC BRONCHOSCOPY Bilateral 2025    Procedure: ROBOTIC BRONCHOSCOPY;  Surgeon: Ismael Cooper MD;  Location: [facility] OR;  Service: Pulmonary;  Laterality: Bilateral;    SPINAL CORD STIMULATOR REMOVAL N/A 2024    Procedure: REMOVAL, NEUROSTIMULATOR, SPINAL;  Surgeon: Maurilio Carroll MD;  Location: [facility] OR;  Service: Pain Management;  Laterality: N/A;  removal of spinal cord stimulator    UPPER GASTROINTESTINAL ENDOSCOPY     [4]   Social History  Tobacco Use    Smoking status: Former     Current packs/day: 0.00     Average packs/day: 1 pack/day for 46.0 years (46.0 ttl pk-yrs)     Types: Cigarettes     Start date: 1977     Quit date: 2023     Years since quittin.3    Smokeless tobacco: Never   Substance Use Topics    Alcohol use: Not Currently    Drug use: Yes     Comment: medical marijuana

## 2025-04-29 NOTE — PROGRESS NOTES
Subjective:   I, Aren Liriano , attest that this documentation has been prepared under the direction and in the presence of Bill Lin MD.     Patient ID: Alessia Nelson is a 69 y.o. female     Chief Complaint: Arachnoid Web Referral as well as surgical spine consult    HPI  Ms. Alessia Nelson is a 69 y.o. 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 This is a pt who was seen by Dr. Staples on 4/15/2025. At the time of this visit, per review, she had pain in her mid to upper back for an onset of a year, which progressively worsened. Pt was then referred to me for discussion of possible fenestration.     Today the patient reports that she was diagnosed with Stage III lung cancer. She endorses back pain with a burning quality when she is sitting up against something. She also endorses her numbness with L > R. She has tips of her fingers tingling, radiating to her whole hand. This causes her reportedly to only sleep on right side as opposed to her back. She also reports issues with her S1. She endorses leg weakness, primarily in her right leg, attributing it to her knee. She has been to PT, but mentions it is still weak. She is currently going to \[facility]"" pain management,    She does report that she has had chronic b/b dysfunction. She mentions she has had chronic diarrhea because of resection in 1986. About 2 years ago after 36 years, she mentions she woke up and she was constipated and didn't go to the bathroom for 6 days. She mentions that she tries to use milk as a way to relieve her constipation. She additionally mentions she had a knee replacement 5 years ago with [facility]. She was previously seeing pain management and Dr. Carroll in [place]. She mentions that PET scan in April of last year did not show anything of interest. In regards to her chemoradiation, she mentions that it would  take 6 weeks.    Review of Systems   Constitutional:  Negative for activity change, appetite change, fatigue, fever and unexpected weight change.   HENT:  Negative for facial swelling.    Eyes: Negative.    Respiratory: Negative.     Cardiovascular: Negative.    Gastrointestinal:  Negative for diarrhea, nausea and vomiting.   Endocrine: Negative.    Genitourinary: Negative.    Musculoskeletal:  Positive for back pain (burning quality). Negative for joint swelling, myalgias and neck pain.        Leg pain in right knee, attributed to knee   Neurological:  Positive for weakness (leg weakness) and numbness (L>R back; paraesthesia in fingers, radiating to whole hand). Negative for dizziness, seizures and headaches.   Psychiatric/Behavioral: Negative.        Past Medical History:   Diagnosis Date    Acute pancreatitis     Back pain     CAD (coronary artery disease)     CHF (congestive heart failure)     COPD (chronic obstructive pulmonary disease)     Depression     Diverticulosis     Encounter for blood transfusion     GERD (gastroesophageal reflux disease)     History of blood clots     1986 AFTER BOWEL RESCETION    History of bowel resection     Hyperlipidemia     Hypertension     Peritonitis     PONV (postoperative nausea and vomiting)     Pulmonary nodule     Seizures     HAD A SEIZURE FROM PHENERGAN     Stroke     left sided weakness    Thyroid disease     TIA (transient ischemic attack)     Wears dentures     upper       Objective:    There were no vitals filed for this visit.   Physical Exam  Constitutional:       General: She is not in acute distress.     Appearance: Normal appearance.   HENT:      Head: Normocephalic and atraumatic.   Pulmonary:      Effort: Pulmonary effort is normal.   Musculoskeletal:      Cervical back: Neck supple.   Neurological:      Mental Status: She is alert and oriented to person, place, and time.      GCS: GCS eye subscore is 4. GCS verbal subscore is 5. GCS motor subscore is 6.       "Cranial Nerves: No cranial nerve deficit.      Motor: Motor function is intact.        IMAGING:  MRI T-Spine W WO Contrast, dated 2025:  1. Redemonstration of two focal areas of dorsal arachnoid space enlargement with focal deformity and ventral displacement of the cord at the T3-T4 and T5-T6 levels, with so-called "scalpel sign" appearance of the cord, as described with dorsal thoracic arachnoid webs.  Thin preserved ventral CSF space militates against ventral cord herniation.  2. Persistent abnormal short segment T2/STIR hyperintense and T1 hypointense cord signal at the T2-T3 level, stable to slightly decreased in conspicuity as compared to the prior exam.  No discrete enhancing intraspinal lesion.  Findings are suspicious for small syrinx with mild surrounding edema presumably related to mass effect and deformity of the cord at T3-4 from a 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.  Other considerations, including an inflammatory/demyelinating process, spinal cord metastasis or primary spinal cord neoplasm less likely.    I have personally reviewed the images with the pt.      I, Dr. Bill Lin, personally performed the services described in this documentation. All medical record entries made by the scribe, Aren Liriano, were at my direction and in my presence.  I have reviewed the chart and agree that the record reflects my personal performance and is accurate and complete. Bill Lin MD. 2025    Assessment:       Stage III lung cancer  Arachnoid web.       Plan:   I have personally reviewed the MRI T Spine W WO Contrast with the pt which shows the followin. Redemonstration of two focal areas of dorsal arachnoid space enlargement with focal deformity and ventral displacement of the cord at the T3-T4 and T5-T6 levels, with so-called "scalpel sign" appearance of the cord, as described with dorsal thoracic arachnoid webs.  Thin preserved ventral CSF space militates against ventral cord " herniation.  2. Persistent abnormal short segment T2/STIR hyperintense and T1 hypointense cord signal at the T2-T3 level, stable to slightly decreased in conspicuity as compared to the prior exam.  No discrete enhancing intraspinal lesion.  Findings are suspicious for small syrinx with mild surrounding edema presumably related to mass effect and deformity of the cord at T3-4 from a 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.  Other considerations, including an inflammatory/demyelinating process, spinal cord metastasis or primary spinal cord neoplasm less likely.    Given the analysis of the imaging and discussion with the pt, I discussed the following treatment option with the patient: T2-T4 laminoplasty.  I have discussed the risks/benefits, indications, and alternatives for the proposed procedure in detail. I have answered all of their questions.  Given she is currently doing radiation and chemotherapy for her grade III lung cancer, I will schedule the patient for a 3 month follow up for continued observation. She is getting repeat imaging at [facility].

## 2025-04-30 ENCOUNTER — OFFICE VISIT (OUTPATIENT)
Dept: RADIATION ONCOLOGY | Facility: CLINIC | Age: 70
End: 2025-04-30
Payer: MEDICARE

## 2025-04-30 ENCOUNTER — TELEPHONE (OUTPATIENT)
Dept: HEMATOLOGY/ONCOLOGY | Facility: CLINIC | Age: 70
End: 2025-04-30
Payer: MEDICARE

## 2025-04-30 ENCOUNTER — CLINICAL SUPPORT (OUTPATIENT)
Dept: REHABILITATION | Facility: HOSPITAL | Age: 70
End: 2025-04-30
Payer: MEDICARE

## 2025-04-30 DIAGNOSIS — R93.7 ABNORMAL MRI, THORACIC SPINE: ICD-10-CM

## 2025-04-30 DIAGNOSIS — Z74.09 IMPAIRED FUNCTIONAL MOBILITY AND ACTIVITY TOLERANCE: Primary | ICD-10-CM

## 2025-04-30 PROCEDURE — 97530 THERAPEUTIC ACTIVITIES: CPT | Mod: PN,CQ

## 2025-04-30 PROCEDURE — 99999 PR PBB SHADOW E&M-EST. PATIENT-LVL II: CPT | Mod: PBBFAC,,, | Performed by: NEUROLOGICAL SURGERY

## 2025-04-30 PROCEDURE — 97112 NEUROMUSCULAR REEDUCATION: CPT | Mod: PN,CQ

## 2025-04-30 PROCEDURE — 97110 THERAPEUTIC EXERCISES: CPT | Mod: PN,CQ

## 2025-04-30 PROCEDURE — 99212 OFFICE O/P EST SF 10 MIN: CPT | Mod: PBBFAC | Performed by: NEUROLOGICAL SURGERY

## 2025-04-30 NOTE — TELEPHONE ENCOUNTER
Patient advised due to plavix port cannot be placed until 5/7. Infusion navigation and radiation all notified for start date of 5/12 . Made an appt with Dr William and lab on 5/9 to clear for this.

## 2025-04-30 NOTE — PATIENT INSTRUCTIONS
"I have personally reviewed the MRI T Spine W WO Contrast with the pt which shows the followin. Redemonstration of two focal areas of dorsal arachnoid space enlargement with focal deformity and ventral displacement of the cord at the T3-T4 and T5-T6 levels, with so-called "scalpel sign" appearance of the cord, as described with dorsal thoracic arachnoid webs.  Thin preserved ventral CSF space militates against ventral cord herniation.  2. Persistent abnormal short segment T2/STIR hyperintense and T1 hypointense cord signal at the T2-T3 level, stable to slightly decreased in conspicuity as compared to the prior exam.  No discrete enhancing intraspinal lesion.  Findings are suspicious for small syrinx with mild surrounding edema presumably related to mass effect and deformity of the cord at T3-4 from a 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.  Other considerations, including an inflammatory/demyelinating process, spinal cord metastasis or primary spinal cord neoplasm less likely.    Given the analysis of the imaging and discussion with the pt, I discussed the following treatment option with the patient: T2-T4 laminoplasty.  I have discussed the risks/benefits, indications, and alternatives for the proposed procedure in detail. I have answered all of their questions.  Given she is currently doing radiation and chemotherapy for her grade III lung cancer, I will schedule the patient for a 3 month follow up for continued observation. She is getting [facility].  "

## 2025-04-30 NOTE — TELEPHONE ENCOUNTER
Patient saw Dr Lin and no plans for any immediate surgery. She wishes to proceed with chemo/xrt. Keesha BLACKMON in navigation and Mana in radiation notifed. Message also sent to dr Jarrett to schedule port placement. Patient will notify me of date of port placement to scheduled treatment.

## 2025-04-30 NOTE — PROGRESS NOTES
"  Outpatient Rehab    Physical Therapy Visit    Patient Name: Alessia Nelson  MRN: [id number]  YOB: 1955  Encounter Date: 4/30/2025    Therapy Diagnosis: No diagnosis found.  Physician: Michele Singh MD    Physician Orders: Eval and Treat  Medical Diagnosis: Presence of right artificial knee joint    Visit # / Visits Authorized:  11 / 12  Insurance Authorization Period: 2/24/2025 to 12/31/2025  Date of Evaluation: 2/24/2025  Plan of Care Certification: 2/24/2025 to 5/24/2025     PT/PTA:     Number of PTA visits since last PT visit:  3  Time In:   12:30 PM  Time Out:   1:40 PM  Total Time:   70 Minutes  Total Billable Time:   55 Minutes    FOTO:  Intake Score:  %  Survey Score 1:  %  Survey Score 2:  %         Subjective      Pain reported as 6/10. Back/Right Knee    Objective            Treatment:  Therapeutic Exercise  TE 1: Seated ham curl w/ band (green ) x 15  TE 2: Seated LAQ x 20; 0#  TE 3: Seated knee hang x 1 min; 0#  Balance/Neuromuscular Re-Education  NMR 1: SAQ's over tan roll - alternate LE's - 2 mins with 3#  NMR 2: Quad sets - 5" H x 2 mins - small towel roll under knee  NMR 3: Toe-Taps - 8" step while standing on Air-Ex x 2 mins  NMR 4: Achilles stretch on wedge - 30 sec x 3  NMR 5: Squat x 10 cueing for improved hip hinging  NMR 6: 3-way hip - x 15 - red tband  NMR 7: Heel raises x 20  Therapeutic Activity  TA 1: Recumbent Bike - Level 2 x 15 mins  TA 2: SLR - 10 x 2  TA 3: Sit <> Stand x 10  TA 4: S/L hip abduction x 10  TA 5: S/L clams x 15  TA 6: Step-ups: 6" step x 15 leading with each leg - forward step  TA 7: Lateral step-up with RLE 6" step x 15  TA 8: Bridges x 15  TA 9: Ball Squeezes x 2 mins    Time Entry(in minutes):  Neuromuscular Re-Education Time Entry: 15  Therapeutic Activity Time Entry: 30  Therapeutic Exercise Time Entry: 10    Assessment & Plan   Assessment: Patient did well with exercises; no exacerbation; dealing with other medical issues       Patient will continue " to benefit from skilled outpatient physical therapy to address the deficits listed in the problem list box on initial evaluation, provide pt/family education and to maximize pt's level of independence in the home and community environment.     Patient's spiritual, cultural, and educational needs considered and patient agreeable to plan of care and goals.           Plan: Continue to advance patient as tolerated per POC for progress toward LTGs    Goals:   Active       LTG's - 6 weeks        Able to perform all household activities with no limitation  (Progressing)       Start:  02/24/25    Expected End:  04/11/25            Able to ambulate community required distances with no limitation  (Progressing)       Start:  02/24/25    Expected End:  04/11/25            Able to ascend/descend a flight of steps with single railing with reciprocal pattern, no limitation  (Progressing)       Start:  02/24/25    Expected End:  04/11/25            Improve RLE strength by 1/2 grade to improve gait/balance and function  (Progressing)       Start:  02/24/25    Expected End:  04/11/25            Right knee AROM - 0 - 125 degrees without increased symptoms.  (Progressing)       Start:  02/24/25    Expected End:  04/11/25               STG's - 3 weeks        Demonstrate improvement in recent symptoms to progress toward long term goals  (Met)       Start:  02/24/25    Expected End:  03/21/25    Resolved:  03/30/25         Correct postural deficits to reduce pain; improve postural awareness for injury prevention  (Progressing)       Start:  02/24/25    Expected End:  03/21/25            Demonstrate compliance with initial exercise program  (Met)       Start:  02/24/25    Expected End:  03/21/25    Resolved:  03/30/25             Jonathan Favre, PTA

## 2025-04-30 NOTE — TELEPHONE ENCOUNTER
----- Message from Sveta sent at 4/30/2025 11:35 AM CDT -----  Type: Needs Medical AdviceWho Called:  ptBest Call Back Number: [phone] Additional Information: pt requesting a call back about  starting infusion, do she need to talk to yall  first or straight to infusions

## 2025-04-30 NOTE — TELEPHONE ENCOUNTER
----- Message from Katherine sent at 4/30/2025  3:09 PM CDT -----  Type:  Patient Returning CallWho Called:ptCarissa Left Message for Patient:Natanael the patient know what this is regarding?:yesWould the patient rather a call back or a response via [employer]? Call [facility] Call Back Number:[phone] Information:      Please call Back to advise. Thanks!

## 2025-05-01 ENCOUNTER — PATIENT MESSAGE (OUTPATIENT)
Dept: HEMATOLOGY/ONCOLOGY | Facility: CLINIC | Age: 70
End: 2025-05-01
Payer: MEDICARE

## 2025-05-01 ENCOUNTER — HOSPITAL ENCOUNTER (OUTPATIENT)
Dept: RADIATION THERAPY | Facility: HOSPITAL | Age: 70
Discharge: HOME | End: 2025-05-01
Attending: RADIOLOGY
Payer: MEDICARE

## 2025-05-05 ENCOUNTER — CLINICAL SUPPORT (OUTPATIENT)
Dept: REHABILITATION | Facility: HOSPITAL | Age: 70
End: 2025-05-05
Payer: MEDICARE

## 2025-05-05 ENCOUNTER — TELEPHONE (OUTPATIENT)
Dept: HEMATOLOGY/ONCOLOGY | Facility: CLINIC | Age: 70
End: 2025-05-05
Payer: MEDICARE

## 2025-05-05 DIAGNOSIS — Z74.09 IMPAIRED FUNCTIONAL MOBILITY AND ACTIVITY TOLERANCE: Primary | ICD-10-CM

## 2025-05-05 PROCEDURE — 97112 NEUROMUSCULAR REEDUCATION: CPT | Mod: PN

## 2025-05-05 PROCEDURE — 97530 THERAPEUTIC ACTIVITIES: CPT | Mod: PN

## 2025-05-05 NOTE — TELEPHONE ENCOUNTER
Spoke with Ms Alessia, she states that she is scheduled for an MRI at [facility] at 11:45 am on 5/9 which can not be rescheduled. Instructed her that I do not see any other availability with Dr William that day but that I will speak with Dr William's nurse when she returns to clinic tomorrow to see if we can fit her at another time. Informed patient that we will be in touch with her tomorrow.     ----- Message from [employer] sent at 5/5/2025  2:28 PM CDT -----  Type: Needs Medical AdviceWho Called:  pt Symptoms (please be specific):  How long has patient had these symptoms:  Pharmacy name and phone #:  Best Call Back Number: [phone] Information: pt is requesting a call back regarding upcoming appt on 5/9

## 2025-05-06 NOTE — PROGRESS NOTES
"  Outpatient Rehab    Physical Therapy Discharge    Patient Name: Alessia Nelson  MRN: [id number]  YOB: 1955  Encounter Date: 5/5/2025    Therapy Diagnosis:   Encounter Diagnosis   Name Primary?    Impaired functional mobility and activity tolerance Yes     Physician: Michele Singh MD    Physician Orders: Eval and Treat  Medical Diagnosis: Presence of right artificial knee joint    Visit # / Visits Authorized:  12 / 12  Insurance Authorization Period: 2/24/2025 to 12/31/2025  Date of Evaluation: 2/24/2025  Plan of Care Certification: 2/24/2025 to 5/24/2025      PT/PTA:     Number of PTA visits since last PT visit:   Time In: 1230   Time Out: 1330  Total Time (in minutes): 60   Total Billable Time (in minutes): 45    FOTO:  Intake Score:  %  Survey Score 2:  %  Survey Score 3:  %         Subjective   Today is last PT visit for Right Knee Rehab following TKA in November 2024.  Alessia is still having pain/swelling in her knee, but is managing.  She saw Dr. Singh last week - he gave her a steroid pack - currently taking and has one more day. Patient going for port placement on Wednesday - chemo starting next week; Will get new MRI of arachnoid webbing in back on Friday..  Pain reported as 5/10. RIght knee; Stated the steroid pack has been helping her back, not much difference in knee.    Objective            Treatment:  Therapeutic Exercise  TE 1: Seated ham curl w/ band (green ) x 15  TE 2: Seated LAQ x 20; 0#  Balance/Neuromuscular Re-Education  NMR 1: SAQ's over tan roll - alternate LE's - 2 mins with 3#  NMR 2: Quad sets - 5" H x 2 mins - small towel roll under knee  NMR 3: Toe-Taps - 8" step while standing on Air-Ex x 2 mins  NMR 4: Achilles stretch on wedge - 30 sec x 3  NMR 5: Squat x 10 cueing for improved hip hinging  NMR 6: 3-way hip - x 15 - red tband  NMR 7: Heel raises x 20  Therapeutic Activity  TA 1: Recumbent Bike - Level 2 x 15 mins  TA 2: SLR - 10 x 2  TA 3: Sit <> Stand x 10  TA 4: S/L hip " "abduction x 10  TA 5: S/L clams x 15  TA 6: Step-ups: 6" step x 15 leading with each leg - forward step  TA 7: Lateral step-up with RLE 6" step x 15  TA 8: Bridges x 15  TA 9: Ball Squeezes x 2 mins    Time Entry(in minutes):  Neuromuscular Re-Education Time Entry: 15  Therapeutic Activity Time Entry: 30    Assessment & Plan   Assessment: Right knee AROM:  0 - 122 degrees;  30sec Sit <> Stand: 9 reps from chair; still shifting some to left side;  Patient has reached max potential with her knee, will continue with HEP/pool at home  We have additional orders for treatment for her back pain - patient to get updated MRI, possible surgery to remove arachnoid webbing, will start chemo/radiation in a fw weeks.  Patient will contact out office if/when she is ready to start therapy from her back.   WIll discharge from this episode for Right TKA Goals  met.  Evaluation/Treatment Tolerance: Patient tolerated treatment well    Patient's spiritual, cultural, and educational needs considered and patient agreeable to plan of care and goals.           Plan: Dsicharge from physial therapy at this time.  Goals met following TKA.    Goals:   Active       LTG's - 6 weeks        Able to perform all household activities with no limitation  (Met)       Start:  02/24/25    Expected End:  05/05/25    Resolved:  05/05/25         Able to ambulate community required distances with no limitation  (Met)       Start:  02/24/25    Expected End:  05/05/25    Resolved:  05/05/25         Able to ascend/descend a flight of steps with single railing with reciprocal pattern, no limitation  (Progressing)       Start:  02/24/25    Expected End:  05/05/25            Improve RLE strength by 1/2 grade to improve gait/balance and function  (Progressing)       Start:  02/24/25    Expected End:  05/05/25            Right knee AROM - 0 - 125 degrees without increased symptoms.  (Met)       Start:  02/24/25    Expected End:  05/05/25    Resolved:  05/05/25       "      STG's - 3 weeks        Demonstrate improvement in recent symptoms to progress toward long term goals  (Met)       Start:  02/24/25    Expected End:  03/21/25    Resolved:  03/30/25         Correct postural deficits to reduce pain; improve postural awareness for injury prevention  (Progressing)       Start:  02/24/25    Expected End:  05/05/25            Demonstrate compliance with initial exercise program  (Met)       Start:  02/24/25    Expected End:  03/21/25    Resolved:  03/30/25             Trinh Reyes, PT

## 2025-05-07 PROBLEM — C34.90 MALIGNANT NEOPLASM OF UNSPECIFIED PART OF UNSPECIFIED BRONCHUS OR LUNG: Status: ACTIVE | Noted: 2025-05-07

## 2025-05-08 ENCOUNTER — DOCUMENTATION ONLY (OUTPATIENT)
Dept: INFUSION THERAPY | Facility: HOSPITAL | Age: 70
End: 2025-05-08
Payer: MEDICARE

## 2025-05-08 ENCOUNTER — OFFICE VISIT (OUTPATIENT)
Dept: HEMATOLOGY/ONCOLOGY | Facility: CLINIC | Age: 70
End: 2025-05-08
Payer: MEDICARE

## 2025-05-08 ENCOUNTER — PATIENT MESSAGE (OUTPATIENT)
Dept: HEMATOLOGY/ONCOLOGY | Facility: CLINIC | Age: 70
End: 2025-05-08

## 2025-05-08 ENCOUNTER — LAB VISIT (OUTPATIENT)
Dept: LAB | Facility: HOSPITAL | Age: 70
End: 2025-05-08
Attending: INTERNAL MEDICINE
Payer: MEDICARE

## 2025-05-08 VITALS
SYSTOLIC BLOOD PRESSURE: 152 MMHG | DIASTOLIC BLOOD PRESSURE: 76 MMHG | OXYGEN SATURATION: 97 % | HEART RATE: 58 BPM | RESPIRATION RATE: 17 BRPM | HEIGHT: 67 IN | BODY MASS INDEX: 31.52 KG/M2 | WEIGHT: 200.81 LBS | TEMPERATURE: 98 F

## 2025-05-08 DIAGNOSIS — E31.21 MEN1 (MULTIPLE ENDOCRINE NEOPLASIA): ICD-10-CM

## 2025-05-08 DIAGNOSIS — C34.90 MALIGNANT NEOPLASM OF UNSPECIFIED PART OF UNSPECIFIED BRONCHUS OR LUNG: ICD-10-CM

## 2025-05-08 DIAGNOSIS — I10 HYPERTENSION, UNSPECIFIED TYPE: ICD-10-CM

## 2025-05-08 DIAGNOSIS — E03.9 HYPOTHYROIDISM, UNSPECIFIED TYPE: ICD-10-CM

## 2025-05-08 DIAGNOSIS — G03.9: ICD-10-CM

## 2025-05-08 DIAGNOSIS — C34.31 CANCER OF LOWER LOBE OF RIGHT LUNG: Primary | ICD-10-CM

## 2025-05-08 DIAGNOSIS — K86.89 PANCREATIC INSUFFICIENCY: ICD-10-CM

## 2025-05-08 LAB
ABSOLUTE EOSINOPHIL (OHS): 0.04 K/UL
ABSOLUTE MONOCYTE (OHS): 1.02 K/UL (ref 0.3–1)
ABSOLUTE NEUTROPHIL COUNT (OHS): 8.4 K/UL (ref 1.8–7.7)
ALBUMIN SERPL BCP-MCNC: 3.8 G/DL (ref 3.5–5.2)
ALP SERPL-CCNC: 97 UNIT/L (ref 40–150)
ALT SERPL W/O P-5'-P-CCNC: 17 UNIT/L (ref 10–44)
ANION GAP (OHS): 11 MMOL/L (ref 8–16)
AST SERPL-CCNC: 14 UNIT/L (ref 11–45)
BASOPHILS # BLD AUTO: 0.03 K/UL
BASOPHILS NFR BLD AUTO: 0.3 %
BILIRUB SERPL-MCNC: 0.2 MG/DL (ref 0.1–1)
BUN SERPL-MCNC: 28 MG/DL (ref 8–23)
CALCIUM SERPL-MCNC: 10.8 MG/DL (ref 8.7–10.5)
CHLORIDE SERPL-SCNC: 106 MMOL/L (ref 95–110)
CO2 SERPL-SCNC: 24 MMOL/L (ref 23–29)
CREAT SERPL-MCNC: 1 MG/DL (ref 0.5–1.4)
ERYTHROCYTE [DISTWIDTH] IN BLOOD BY AUTOMATED COUNT: 16.5 % (ref 11.5–14.5)
GFR SERPLBLD CREATININE-BSD FMLA CKD-EPI: >60 ML/MIN/1.73/M2
GLUCOSE SERPL-MCNC: 112 MG/DL (ref 70–110)
HCT VFR BLD AUTO: 34.3 % (ref 37–48.5)
HGB BLD-MCNC: 10.8 GM/DL (ref 12–16)
IMM GRANULOCYTES # BLD AUTO: 0.11 K/UL (ref 0–0.04)
IMM GRANULOCYTES NFR BLD AUTO: 1 % (ref 0–0.5)
LYMPHOCYTES # BLD AUTO: 1.84 K/UL (ref 1–4.8)
MCH RBC QN AUTO: 29 PG (ref 27–31)
MCHC RBC AUTO-ENTMCNC: 31.5 G/DL (ref 32–36)
MCV RBC AUTO: 92 FL (ref 82–98)
NUCLEATED RBC (/100WBC) (OHS): 0 /100 WBC
PLATELET # BLD AUTO: 263 K/UL (ref 150–450)
PMV BLD AUTO: 8.5 FL (ref 9.2–12.9)
POTASSIUM SERPL-SCNC: 4.3 MMOL/L (ref 3.5–5.1)
PROT SERPL-MCNC: 6.9 GM/DL (ref 6–8.4)
RBC # BLD AUTO: 3.73 M/UL (ref 4–5.4)
RELATIVE EOSINOPHIL (OHS): 0.3 %
RELATIVE LYMPHOCYTE (OHS): 16.1 % (ref 18–48)
RELATIVE MONOCYTE (OHS): 8.9 % (ref 4–15)
RELATIVE NEUTROPHIL (OHS): 73.4 % (ref 38–73)
SODIUM SERPL-SCNC: 141 MMOL/L (ref 136–145)
WBC # BLD AUTO: 11.44 K/UL (ref 3.9–12.7)

## 2025-05-08 PROCEDURE — 99215 OFFICE O/P EST HI 40 MIN: CPT | Mod: PBBFAC,PN | Performed by: INTERNAL MEDICINE

## 2025-05-08 PROCEDURE — 85025 COMPLETE CBC W/AUTO DIFF WBC: CPT | Mod: PN

## 2025-05-08 PROCEDURE — 82947 ASSAY GLUCOSE BLOOD QUANT: CPT | Mod: PN

## 2025-05-08 PROCEDURE — 99215 OFFICE O/P EST HI 40 MIN: CPT | Mod: S$PBB,,, | Performed by: INTERNAL MEDICINE

## 2025-05-08 PROCEDURE — 99999 PR PBB SHADOW E&M-EST. PATIENT-LVL V: CPT | Mod: PBBFAC,,, | Performed by: INTERNAL MEDICINE

## 2025-05-08 PROCEDURE — 36415 COLL VENOUS BLD VENIPUNCTURE: CPT | Mod: PN

## 2025-05-08 PROCEDURE — G2211 COMPLEX E/M VISIT ADD ON: HCPCS | Mod: S$PBB,,, | Performed by: INTERNAL MEDICINE

## 2025-05-08 RX ORDER — SODIUM CHLORIDE 0.9 % (FLUSH) 0.9 %
10 SYRINGE (ML) INJECTION
OUTPATIENT
Start: 2025-05-12

## 2025-05-08 RX ORDER — HEPARIN 100 UNIT/ML
500 SYRINGE INTRAVENOUS
OUTPATIENT
Start: 2025-05-12

## 2025-05-08 RX ORDER — FAMOTIDINE 10 MG/ML
20 INJECTION, SOLUTION INTRAVENOUS
OUTPATIENT
Start: 2025-05-12

## 2025-05-08 RX ORDER — EPINEPHRINE 0.3 MG/.3ML
0.3 INJECTION SUBCUTANEOUS ONCE AS NEEDED
OUTPATIENT
Start: 2025-05-12

## 2025-05-08 RX ORDER — DIPHENHYDRAMINE HYDROCHLORIDE 50 MG/ML
50 INJECTION, SOLUTION INTRAMUSCULAR; INTRAVENOUS ONCE AS NEEDED
OUTPATIENT
Start: 2025-05-12

## 2025-05-08 NOTE — PROGRESS NOTES
Ms. Nelson notified Woodrow at the [facility] that she had some documentation for . SOCORRO met with the patient, and she provided her proof of income for support enrollment. Ms. Neslon meets both mileage and income qualifications. SOCORRO completed a support enrollment form and provided the patient with a gas card today. The patient also stated that she was told she is going to lose her hair quickly and inquired about wig boutique. SOCORRO let the patient know that she can make an appointment for a wig fitting at any time and pointed out where to go to schedule that appointment. Ms. Nelson expressed her appreciation for the assistance.

## 2025-05-08 NOTE — PROGRESS NOTES
PATIENT: Alessia Nelson  MRN: [id number]  DATE: 5/8/2025      Diagnosis:   1. Cancer of lower lobe of right lung    2. Arachnoid membrane inflammation    3. Hypertension, unspecified type    4. MEN1 (multiple endocrine neoplasia)    5. Hypothyroidism, unspecified type    6. Pancreatic insufficiency              Chief Complaint: Follow-up (Malignant neoplasm of unspecified part of unspecified bronchus or lung)      Oncologic History:      Oncologic History     Oncologic Treatment     Pathology           Subjective:    Interval History:  Ms. Nelson is a 69 y.o. female with CAD, CHF, COPD, depression, GERD, HTN, HLD, Seizures, h/o CVA, thyroid disease who presents for NSCLC.  Since the last clinic visit the patient underwent PORT placement 5/07/25.  Pt endorses soreness at the PORT site.  Pt endorses headache.  She endorses numbness in her left foot which has been chronic.  The patient denies CP, cough, SOB, abdominal pain, nausea, vomiting, constipation, diarrhea.  The patient denies fever, chills, night sweats, weight loss, new lumps or bumps, easy bruising or bleeding.    Prior History:  The patient was being followed for a nodule in the right lower lobe and had PET-CT on 05/22/2024 which showed no avidity.  CT chest on 11/11/2024 showed enlargement of the ground-glass right lower lobe lung nodule measuring 18 x 19 mm and small bilateral soft tissue pulmonary nodules measuring 3-8 mm.  The patient underwent robotic bronchoscopy and EBUS on 02/20/2025 with pathology showing squamous cell carcinoma and biopsy of right lower lung nodule and a positive station 7 lymph node.  Stations 4R, 11R S and 11 RI were negative.  TEMPUS tissue testing showed mutations in CDKN2A, FAT1, and TP53.  PD-L1 was <1%.  The patient underwent CT myelogram and MRI of the thoracic spine 03/13/2025 for chronic upper back pain with radiation down bilateral arms.   CT thoracic spine on 03/13/2025 report obtained showing stable expansile mass  of the superior thoracic cord extending from T2 through T4 measuring 55 mm in length by 10 mm medial lateral and 9 mm AP.  MRI T-spine on 03/13/2025 showed expansile mass in the thoracic cord concerning for potential ependymoma versus astrocytoma.   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  Dr Staples 4/15/25 felt the patient needed fenestration with neurosurgery.     Past Medical History:   Past Medical History:   Diagnosis Date    Acute pancreatitis     Anticoagulant long-term use     Back pain     CAD (coronary artery disease)     CHF (congestive heart failure)     COPD (chronic obstructive pulmonary disease)     Depression     Diverticulosis     Encounter for blood transfusion     GERD (gastroesophageal reflux disease)     History of blood clots     1986 AFTER BOWEL RESCETION    History of bowel resection     Hyperlipidemia     Hypertension     Malignant neoplasm of unspecified part of unspecified bronchus or lung     Peritonitis     PONV (postoperative nausea and vomiting)     Pulmonary nodule     Seizures     HAD A SEIZURE FROM PHENERGAN     Stroke     left sided weakness    Thyroid disease     TIA (transient ischemic attack)     Wears dentures     upper       Past Surgical HIstory:   Past Surgical History:   Procedure Laterality Date    ADRENAL GLAND SURGERY      APPENDECTOMY      BACK SURGERY      CAUDAL EPIDURAL STEROID INJECTION N/A 03/01/2021    Procedure: Injection-steroid-epidural-caudal;  Surgeon: Socorro Lorenz MD;  Location: [facility] OR;  Service: Pain Management;  Laterality: N/A;    CHOLECYSTECTOMY      COLONOSCOPY      COLONOSCOPY N/A 06/10/2021    Procedure: COLONOSCOPY;  Surgeon: Sheree Metz MD;  Location: [facility];  Service: Endoscopy;  Laterality: N/A;    COLONOSCOPY N/A 03/08/2022    Procedure: COLONOSCOPY;   Surgeon: Maurilio Rodriguez MD;  Location: [facility] ENDO (2ND FLR);  Service: Endoscopy;  Laterality: N/A;  Pt to perform at-home COVID test morning of procedure-DS  [facility]see letters tab 2/[facility]DS  3/2-Instructions sent via email-DS  3/7-Pt unable to come earlier due to ride-DS    CORONARY STENT PLACEMENT      CYSTOURETHROSCOPY N/A 11/13/2019    Procedure: CYSTOURETHROSCOPY;  Surgeon: Shun Nuñez MD;  Location: [facility] OR;  Service: Urology;  Laterality: N/A;    DIRECT DIAGNOSTIC LARYNGOSCOPY WITH BRONCHOSCOPY AND ESOPHAGOSCOPY N/A 01/03/2024    Procedure: LARYNGOSCOPY, DIRECT, DIAGNOSTIC, WITH BRONCHOSCOPY AND ESOPHAGOSCOPY;  Surgeon: Mir Belle MD;  Location: [facility] OR;  Service: ENT;  Laterality: N/A;    ENDOBRONCHIAL ULTRASOUND Bilateral 2/20/2025    Procedure: ENDOBRONCHIAL ULTRASOUND (EBUS);  Surgeon: Ismael Cooper MD;  Location: [facility] OR;  Service: Pulmonary;  Laterality: Bilateral;    ENDOSCOPIC ULTRASOUND OF LOWER GASTROINTESTINAL TRACT N/A 03/08/2022    Procedure: ULTRASOUND, LOWER GI TRACT, ENDOSCOPIC;  Surgeon: Maurilio Rodriguez MD;  Location: [facility] ENDO (2ND FLR);  Service: Endoscopy;  Laterality: N/A;  Pt to perform at-home COVID test morning of procedure-DS  2[facility]24-[facility]see letters tab 2/24/22[facility]DS  3/2-Instructions sent via email-DS  3/7-Pt unable to come earlier due to ride-DS    ENDOSCOPIC ULTRASOUND OF UPPER GASTROINTESTINAL TRACT N/A 11/25/2019    Procedure: ULTRASOUND, UPPER GI TRACT, ENDOSCOPIC;  Surgeon: Alhaji Bridges MD;  Location: [facility] ENDO (2ND FLR);  Service: Endoscopy;  Laterality: N/A;  5 day hold Plavix, Dr Jovanni Serrano - pg  PM prep    ENDOSCOPIC ULTRASOUND OF UPPER GASTROINTESTINAL TRACT N/A 11/02/2020    Procedure: ULTRASOUND, UPPER GI TRACT, ENDOSCOPIC;  Surgeon: Maurilio Rodriguez MD;  Location: [facility] ENDO (2ND FLR);  Service: Endoscopy;  Laterality: N/A;  5 day hold Plavix, Dr Roman Walsh -  pg  Covid-19 test 10/30/20 at [facility] pg    EPIDURAL STEROID INJECTION INTO CERVICAL SPINE N/A 12/08/2021    Procedure: Injection-steroid-epidural-cervical;  Surgeon: Socorro Lorenz MD;  Location: [facility] OR;  Service: Pain Management;  Laterality: N/A;    EPIDURAL STEROID INJECTION INTO CERVICAL SPINE N/A 11/30/2023    Procedure: Injection-steroid-epidural-cervical;  Surgeon: Mauriilo Carroll MD;  Location: [facility] OR;  Service: Anesthesiology;  Laterality: N/A;  c7-T1    ESOPHAGOGASTRODUODENOSCOPY N/A 06/10/2021    Procedure: EGD (ESOPHAGOGASTRODUODENOSCOPY);  Surgeon: Sheree Metz MD;  Location: [facility];  Service: Endoscopy;  Laterality: N/A;    ESOPHAGOGASTRODUODENOSCOPY N/A 09/26/2024    Procedure: EGD (ESOPHAGOGASTRODUODENOSCOPY);  Surgeon: Jovanny Saldivar MD;  Location: [facility];  Service: Endoscopy;  Laterality: N/A;    ESOPHAGOSCOPY, USING BOUGIE, WITH DILATION N/A 01/03/2024    Procedure: ESOPHAGOSCOPY, USING BOUGIE, WITH DILATION;  Surgeon: Mir Belle MD;  Location: [facility] OR;  Service: ENT;  Laterality: N/A;    FRACTURE SURGERY Left 05/02/2022    ankle    HERNIA REPAIR      HYSTERECTOMY      INCISIONAL HERNIA REPAIR      INJECTION OF ANESTHETIC AGENT AROUND NERVE Right 05/27/2019    Procedure: RIGHT L5-S3 MEDIAL BRANCH BLOCKS;  Surgeon: Sneha Aragon MD;  Location: [facility] OR;  Service: Pain Management;  Laterality: Right;  **DO NOT STOP PLAVIX**    INJECTION OF JOINT Right 09/22/2021    Procedure: Injection, Joint; Right SI Joint Injection;  Surgeon: Socorro Lorenz MD;  Location: [facility] OR;  Service: Pain Management;  Laterality: Right;  Oral    INSERTION OF DORSAL COLUMN NERVE STIMULATOR FOR TRIAL N/A 06/07/2021    Procedure: INSERTION, NEUROSTIMULATOR, SPINAL CORD, DORSAL COLUMN, FOR TRIAL;  Surgeon: Socorro Lorenz MD;  Location: [facility] OR;  Service: Pain Management;  Laterality: N/A;  nevro rep confirmed start time    INSERTION OF TUNNELED CENTRAL VENOUS CATHETER (CVC) WITH  "SUBCUTANEOUS PORT N/A 5/7/2025    Procedure: [id number];  Surgeon: Janet Jarrett MD;  Location: [facility] OR;  Service: General;  Laterality: N/A;    instestine      bowel section    KNEE ARTHROPLASTY Right 12/18/2019    Procedure: ARTHROPLASTY, KNEE;  Surgeon: Ike Briceño II, MD;  Location: [facility] OR;  Service: Orthopedics;  Laterality: Right;    KNEE SURGERY  1983    OOPHORECTOMY      PARATHYROID GLAND SURGERY      3 surgeries    RADIOFREQUENCY ABLATION Right 06/10/2019    Procedure: Radiofrequency Ablation - RIGHT L3-5 RADIOFREQUENCY ABLATION WITH HALYARD COOLIEF THERMAL SYSTEM;  Surgeon: Sneha Aragon MD;  Location: [facility] OR;  Service: Pain Management;  Laterality: Right;  **HOLD PLAVIX x 7 DAYS PRIOR**    REVISION OF KNEE ARTHROPLASTY Right     AVALA with Dr Singh    ROBOTIC BRONCHOSCOPY Bilateral 2/20/2025    Procedure: ROBOTIC BRONCHOSCOPY;  Surgeon: Ismael Cooper MD;  Location: [facility] OR;  Service: Pulmonary;  Laterality: Bilateral;    SPINAL CORD STIMULATOR REMOVAL N/A 02/21/2024    Procedure: REMOVAL, NEUROSTIMULATOR, SPINAL;  Surgeon: Maurilio Carroll MD;  Location: [facility] OR;  Service: Pain Management;  Laterality: N/A;  removal of spinal cord stimulator    UPPER GASTROINTESTINAL ENDOSCOPY         Family History:   Family History   Problem Relation Name Age of Onset    Stroke Maternal Grandmother      Cancer Maternal Grandfather      Stroke Mother      Heart disease Father      Breast cancer Sister         Social History:  reports that she quit smoking about 2 years ago. Her smoking use included cigarettes. She started smoking about 48 years ago. She has a 46 pack-year smoking history. She has never used smokeless tobacco. She reports that she does not currently use alcohol. She reports current drug use.    Allergies:  Review of patient's allergies indicates:   Allergen Reactions    Aspirin Other (See Comments)     "Makes stomach feel like it's on fire"    Phenergan [promethazine] Other (See " "Comments)     SEIZURES    Nickel     Lortab [hydrocodone-acetaminophen] Itching     Able to take generic Norco       Medications:  Current Medications[1]    Review of Systems   Constitutional:  Negative for chills, fatigue, fever and unexpected weight change.   Respiratory:  Negative for cough and shortness of breath.    Cardiovascular:  Negative for chest pain and palpitations.   Gastrointestinal:  Negative for abdominal pain, constipation, diarrhea, nausea and vomiting.   Musculoskeletal:  Positive for back pain.   Skin:  Negative for rash.   Neurological:  Positive for numbness (left foot) and headaches.   Hematological:  Negative for adenopathy. Does not bruise/bleed easily.       ECOG Performance Status: 2   Objective:      Vitals:   Vitals:    05/08/25 1115   BP: (!) 152/76   BP Location: Left arm   Patient Position: Sitting   Pulse: (!) 58   Resp: 17   Temp: 97.8 °F (36.6 °C)   TempSrc: Temporal   SpO2: 97%   Weight: 91.1 kg (200 lb 13.4 oz)   Height: 5' 7" (1.702 m)           Physical Exam  Constitutional:       General: She is not in acute distress.     Appearance: She is well-developed. She is not diaphoretic.   HENT:      Head: Normocephalic and atraumatic.   Cardiovascular:      Rate and Rhythm: Normal rate and regular rhythm.      Heart sounds: Normal heart sounds. No murmur heard.     No friction rub. No gallop.   Pulmonary:      Effort: Pulmonary effort is normal. No respiratory distress.      Breath sounds: Normal breath sounds. No wheezing or rales.   Chest:      Chest wall: No tenderness.   Abdominal:      General: Bowel sounds are normal. There is no distension.      Palpations: Abdomen is soft. There is no mass.      Tenderness: There is no abdominal tenderness. There is no guarding or rebound.   Lymphadenopathy:      Cervical: No cervical adenopathy.      Upper Body:      Right upper body: No supraclavicular or axillary adenopathy.      Left upper body: No supraclavicular or axillary adenopathy. "   Skin:     Findings: No erythema or rash.   Neurological:      Mental Status: She is alert and oriented to person, place, and time.   Psychiatric:         Behavior: Behavior normal.         Laboratory Data:  Lab Visit on 05/08/2025   Component Date Value Ref Range Status    Sodium 05/08/2025 141  136 - 145 mmol/L Final    Potassium 05/08/2025 4.3  3.5 - 5.1 mmol/L Final    Chloride 05/08/2025 106  95 - 110 mmol/L Final    CO2 05/08/2025 24  23 - 29 mmol/L Final    Glucose 05/08/2025 112 (H)  70 - 110 mg/dL Final    BUN 05/08/2025 28 (H)  8 - 23 mg/dL Final    Creatinine 05/08/2025 1.0  0.5 - 1.4 mg/dL Final    Calcium 05/08/2025 10.8 (H)  8.7 - 10.5 mg/dL Final    Protein Total 05/08/2025 6.9  6.0 - 8.4 gm/dL Final    Albumin 05/08/2025 3.8  3.5 - 5.2 g/dL Final    Bilirubin Total 05/08/2025 0.2  0.1 - 1.0 mg/dL Final    For infants and newborns, interpretation of results should be based   on gestational age, weight and in agreement with clinical   observations.    Premature Infant recommended reference ranges:   0-24 hours:  <8.0 mg/dL   24-48 hours: <12.0 mg/dL   3-5 days:    <15.0 mg/dL   6-29 days:   <15.0 mg/dL    ALP 05/08/2025 97  40 - 150 unit/L Final    AST 05/08/2025 14  11 - 45 unit/L Final    ALT 05/08/2025 17  10 - 44 unit/L Final    Anion Gap 05/08/2025 11  8 - 16 mmol/L Final    eGFR 05/08/2025 >60  >60 mL/min/1.73/m2 Final    Estimated GFR calculated using the CKD-EPI creatinine (2021) equation.    WBC 05/08/2025 11.44  3.90 - 12.70 K/uL Final    RBC 05/08/2025 3.73 (L)  4.00 - 5.40 M/uL Final    HGB 05/08/2025 10.8 (L)  12.0 - 16.0 gm/dL Final    HCT 05/08/2025 34.3 (L)  37.0 - 48.5 % Final    MCV 05/08/2025 92  82 - 98 fL Final    MCH 05/08/2025 29.0  27.0 - 31.0 pg Final    MCHC 05/08/2025 31.5 (L)  32.0 - 36.0 g/dL Final    RDW 05/08/2025 16.5 (H)  11.5 - 14.5 % Final    Platelet Count 05/08/2025 263  150 - 450 K/uL Final    MPV 05/08/2025 8.5 (L)  9.2 - 12.9 fL Final    Nucleated RBC 05/08/2025  0  <=0 /100 WBC Final    Neut % 05/08/2025 73.4 (H)  38 - 73 % Final    Lymph % 05/08/2025 16.1 (L)  18 - 48 % Final    Mono % 05/08/2025 8.9  4 - 15 % Final    Eos % 05/08/2025 0.3  <=8 % Final    Basophil % 05/08/2025 0.3  <=1.9 % Final    Imm Grans % 05/08/2025 1.0 (H)  0.0 - 0.5 % Final    Neut # 05/08/2025 8.40 (H)  1.8 - 7.7 K/uL Final    Lymph # 05/08/2025 1.84  1 - 4.8 K/uL Final    Mono # 05/08/2025 1.02 (H)  0.3 - 1 K/uL Final    Eos # 05/08/2025 0.04  <=0.5 K/uL Final    Baso # 05/08/2025 0.03  <=0.2 K/uL Final    Imm Grans # 05/08/2025 0.11 (H)  0.00 - 0.04 K/uL Final    Mild elevation in immature granulocytes is non specific and can be seen in a variety of conditions including stress response, acute inflammation, trauma and pregnancy. Correlation with other laboratory and clinical findings is essential.   Admission on 05/07/2025, Discharged on 05/07/2025   Component Date Value Ref Range Status    POCT Glucose 05/07/2025 118 (H)  70 - 110 mg/dL Final         Imaging:     MRI Brain 3/07/25  Gray matter distinction is preserved throughout the brain parenchyma. The ventricles are midline without mass effect. There is mild periventricular and deep white matter FLAIR and T2 hyperintensities likely reflecting changes of chronic microvascular ischemia. No intra-axial hemorrhage or restricted diffusion. No intra-axial fluid collection or mass. Bone marrow signal of the calvarium is within normal limits. Major vascular flow voids are within normal limits. The orbits globes and optic nerves are grossly unremarkable. Paranasal sinuses are unremarkable.    PET/CT 3/10/25  Quality of the study: Adequate.     There is increased FDG avidity within a posterior right upper lobe pulmonary nodule measuring 2.0 cm on transmission CT images. This shows max SUV of 3.6 on today's exam versus 1.8 on prior exam.     No other abnormal regions of FDG uptake are identified.     Physiologic uptake of the tracer is present within the  brain, salivary glands, myocardium, GI and  tracts.     Incidental CT findings: N/A       Assessment:       1. Cancer of lower lobe of right lung    2. Arachnoid membrane inflammation    3. Hypertension, unspecified type    4. MEN1 (multiple endocrine neoplasia)    5. Hypothyroidism, unspecified type    6. Pancreatic insufficiency                 Plan:     NSCLC - Pt with stage IIIa pT1cN2 NSCLC SCC of the RLL with met to station 7 LN  -TEMPUS tissue testing showed mutations in CDKN2A, FAT1, and TP53.  PD-L1 was <1%  -MRI brain KRISHNA on 3/07/25  -Plan to start pt on Carboplatin and Paclitaxel with XRT followed by year treatment with Durvalumab  -Pt to start treatment 5/12/25  Visit today included increased complexity associated with the care of the episodic problem (chemotherapy) addressed and managing the longitudinal care of the patient due to the serious and/or complex managed problem(s) NSCLC.    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 Pt would like to be in a private room during chemo on 5/12/25.  Pt needs a CBC adn CMP in 2 weeks with appt with NP and treatment.  Pt needs the same labs and an appt with me in 3 weeks with treatment.   Follow up with [facility] 2 weeks.   Infusion scheduling note    Injection scheduling note    Labs    Imaging    Pharmacy appointment    Other referrals              Treatment Plan Information   OP NSCLC PACLitaxel CARBOplatin QW + radiation Sea William MD   Associated diagnosis: Cancer of lower lobe of right lung  cT1c, cN2(f), cM0 noted on 3/14/2025   Line of treatment: Adjuvant  Treatment Goal: Curative     Upcoming Treatment Dates - OP NSCLC PACLitaxel CARBOplatin QW + radiation    5/5/2025       Pre-Medications       palonosetron 0.25mg/dexAMETHasone 12mg in NS IVPB 0.25 mg 50 mL       diphenhydrAMINE (BENADRYL) 50 mg in NS 50 mL IVPB       famotidine (PF) injection 20 mg       Chemotherapy       PACLitaxeL (TAXOL) in 0.9% NaCl 250 mL chemo infusion       CARBOplatin (PARAPLATIN) 205 mg in 0.9% NaCl 270.5 mL chemo infusion  5/12/2025       Pre-Medications       palonosetron 0.25mg/dexAMETHasone 12mg in NS IVPB 0.25 mg 50 mL       diphenhydrAMINE (BENADRYL) 50 mg in NS 50 mL IVPB       famotidine (PF) injection 20 mg       Chemotherapy       PACLitaxeL (TAXOL) in 0.9% NaCl 250 mL chemo infusion       CARBOplatin (PARAPLATIN) in 0.9% NaCl 250 mL chemo infusion  5/19/2025       Pre-Medications       palonosetron 0.25mg/dexAMETHasone 12mg in NS IVPB 0.25 mg 50 mL       diphenhydrAMINE (BENADRYL) 50 mg in NS 50 mL IVPB       famotidine (PF) injection 20 mg       Chemotherapy       PACLitaxeL (TAXOL) in 0.9% NaCl 250 mL chemo infusion       CARBOplatin (PARAPLATIN) in 0.9% NaCl 250 mL chemo infusion  5/26/2025       Pre-Medications       palonosetron 0.25mg/dexAMETHasone 12mg in NS IVPB 0.25 mg 50 mL       diphenhydrAMINE (BENADRYL) 50 mg in NS 50 mL IVPB       famotidine  (PF) injection 20 mg       Chemotherapy       PACLitaxeL (TAXOL) in 0.9% NaCl 250 mL chemo infusion       CARBOplatin (PARAPLATIN) in 0.9% NaCl 250 mL chemo infusion      Sea William MD  [facility]  Hematology and Oncology  [facility]   [address] [place] [address]   O: [phone]  F: [phone]                   [1]   Current Outpatient Medications   Medication Sig Dispense Refill    acetaminophen (TYLENOL) 325 MG tablet Take 2 tablets (650 mg total) by mouth every 6 (six) hours as needed for Pain (Do not take with any other tylenol containing products).  0    allopurinoL (ZYLOPRIM) 300 MG tablet TAKE 1 TABLET DAILY (Patient taking differently: Take 300 mg by mouth Daily.) 90 tablet 1    amLODIPine (NORVASC) 5 MG tablet TAKE 1 TABLET(5 MG) BY MOUTH TWICE DAILY 180 tablet 1    busPIRone (BUSPAR) 5 MG Tab Take 1 tablet (5 mg total) by mouth 2 (two) times daily. 180 tablet 3    calcium carbonate (CALCIUM 500) 500 mg calcium (1,250 mg) chewable tablet Take 1 tablet by mouth once daily.      chlorhexidine (PERIDEX) 0.12 % solution Use as directed 10 mLs in the mouth or throat 2 (two) times daily. SWISH 10MLS FOR 30 SECONDS AND SPIT for 5 days 100 mL 0    clopidogreL (PLAVIX) 75 mg tablet Take 1 tablet (75 mg total) by mouth once daily. 90 tablet 3    colestipoL (COLESTID) 1 gram Tab TAKE 2 TABLETS ONCE DAILY (Patient taking differently: Take 2 g by mouth once daily.) 180 tablet 1    cyanocobalamin 1,000 mcg/mL injection INJECT 1 ML IN THE MUSCLE EVERY 14 DAYS (Patient taking differently: Inject 1,000 mcg into the muscle every 14 (fourteen) days.) 10 mL 0    EScitalopram oxalate (LEXAPRO) 20 MG tablet TAKE 1 TABLET EVERY EVENING 90 tablet 3    fluticasone propionate (FLONASE) 50 mcg/actuation nasal spray 2 sprays (100 mcg total) by Each Nostril route 2 (two) times daily. 48 g 3    furosemide (LASIX) 20 MG tablet TAKE 1 TABLET(20 MG) BY MOUTH EVERY DAY 90 tablet 3     gabapentin (NEURONTIN) 400 MG capsule Take 1 capsule (400 mg total) by mouth 2 (two) times daily. 180 capsule 3    hydrALAZINE (APRESOLINE) 25 MG tablet Take 1 tablet (25 mg total) by mouth daily as needed (Check BP after taking amlodipine, Lasix, metoprolol.  If it continues to stay greater than 160/100 take hydralazine). 30 tablet 0    levothyroxine (SYNTHROID) 75 MCG tablet TAKE 1 TABLET DAILY 90 tablet 3    LIDOcaine-prilocaine (EMLA) cream Apply topically as needed (Apply 30 minutes prior to port access). 30 g 2    magnesium oxide (MAG-OX) 400 mg (241.3 mg magnesium) tablet Take 1 tablet (400 mg total) by mouth once daily.      methocarbamoL (ROBAXIN) 750 MG Tab Take 1 tablet (750 mg total) by mouth every 8 (eight) hours as needed (muscle spasm). 270 tablet 3    methylPREDNISolone (MEDROL DOSEPACK) 4 mg tablet Take 4 mg by mouth.      metoprolol succinate (TOPROL-XL) 50 MG 24 hr tablet Take 1 tablet (50 mg total) by mouth once daily. Pt takes 1/2 tab daily 90 tablet 2    mupirocin (BACTROBAN) 2 % ointment by Nasal route 2 (two) times daily. APPLY 1 GM TO EACH NOSTRIL for 5 days 22 g 0    OLANZapine (ZYPREXA) 5 MG tablet Take 1 tablet (5 mg total) by mouth every evening. On days 1-3 of each chemotherapy cycle. 12 tablet 1    ondansetron (ZOFRAN) 8 MG tablet Take 1 tablet (8 mg total) by mouth every 8 (eight) hours as needed for Nausea. 30 tablet 2    oxyCODONE-acetaminophen (PERCOCET) 5-325 mg per tablet Take 1 tablet by mouth every 8 (eight) hours as needed for Pain.      pantoprazole (PROTONIX) 40 MG tablet TAKE 1 TABLET DAILY (Patient taking differently: Take 40 mg by mouth once daily.) 90 tablet 3    potassium chloride SA (K-DUR,KLOR-CON M) 10 MEQ tablet Take 1 tablet (10 mEq total) by mouth once daily. (Patient taking differently: Take 10 mEq by mouth nightly.) 90 tablet 3    rosuvastatin (CRESTOR) 10 MG tablet TAKE 1 TABLET DAILY (Patient taking differently: Take 10 mg by mouth once daily.) 90 tablet 3     traMADoL (ULTRAM) 50 mg tablet Take 1 tablet (50 mg total) by mouth every 6 (six) hours as needed for Pain. 10 tablet 0    traZODone (DESYREL) 100 MG tablet TAKE 1 TABLET EVERY EVENING (Patient taking differently: Take 100 mg by mouth every evening.) 90 tablet 3    vit A/vit C/vit E/zinc/copper (PRESERVISION AREDS ORAL) Take 1 capsule by mouth 2 (two) times a day.      vitamin D (VITAMIN D3) 1000 units Tab Take 1,000 Units by mouth 2 (two) times a day.      ZENPEP 40,000-126,000- 168,000 unit CpDR Take 1 capsule by mouth once daily.       No current facility-administered medications for this visit.     Facility-Administered Medications Ordered in Other Visits   Medication Dose Route Frequency Provider Last Rate Last Admin    oxyCODONE immediate release tablet 5 mg  5 mg Oral Once PRN Paul Nunez MD

## 2025-05-12 ENCOUNTER — INFUSION (OUTPATIENT)
Dept: INFUSION THERAPY | Facility: HOSPITAL | Age: 70
End: 2025-05-12
Attending: INTERNAL MEDICINE
Payer: MEDICARE

## 2025-05-12 ENCOUNTER — DOCUMENTATION ONLY (OUTPATIENT)
Dept: INFUSION THERAPY | Facility: HOSPITAL | Age: 70
End: 2025-05-12
Payer: MEDICARE

## 2025-05-12 ENCOUNTER — DOCUMENTATION ONLY (OUTPATIENT)
Dept: RADIATION ONCOLOGY | Facility: CLINIC | Age: 70
End: 2025-05-12
Payer: MEDICARE

## 2025-05-12 VITALS
WEIGHT: 195.31 LBS | HEART RATE: 70 BPM | DIASTOLIC BLOOD PRESSURE: 71 MMHG | RESPIRATION RATE: 18 BRPM | TEMPERATURE: 98 F | HEIGHT: 67 IN | SYSTOLIC BLOOD PRESSURE: 124 MMHG | BODY MASS INDEX: 30.65 KG/M2 | OXYGEN SATURATION: 97 %

## 2025-05-12 DIAGNOSIS — Z95.828 PORT-A-CATH IN PLACE: ICD-10-CM

## 2025-05-12 DIAGNOSIS — C34.31 CANCER OF LOWER LOBE OF RIGHT LUNG: Primary | ICD-10-CM

## 2025-05-12 DIAGNOSIS — C34.31 CANCER OF LOWER LOBE OF RIGHT LUNG: ICD-10-CM

## 2025-05-12 DIAGNOSIS — L03.90 CELLULITIS, UNSPECIFIED CELLULITIS SITE: Primary | ICD-10-CM

## 2025-05-12 PROCEDURE — 96375 TX/PRO/DX INJ NEW DRUG ADDON: CPT | Mod: PN

## 2025-05-12 PROCEDURE — 63600175 PHARM REV CODE 636 W HCPCS: Mod: PN | Performed by: INTERNAL MEDICINE

## 2025-05-12 PROCEDURE — 96417 CHEMO IV INFUS EACH ADDL SEQ: CPT | Mod: PN

## 2025-05-12 PROCEDURE — 96367 TX/PROPH/DG ADDL SEQ IV INF: CPT | Mod: PN

## 2025-05-12 PROCEDURE — 77386 HC IMRT, COMPLEX: CPT | Mod: PN | Performed by: RADIOLOGY

## 2025-05-12 PROCEDURE — 25000003 PHARM REV CODE 250: Mod: PN | Performed by: INTERNAL MEDICINE

## 2025-05-12 PROCEDURE — 77014 PR  CT GUIDANCE PLACEMENT RAD THERAPY FIELDS: CPT | Mod: 26,,, | Performed by: RADIOLOGY

## 2025-05-12 PROCEDURE — 96413 CHEMO IV INFUSION 1 HR: CPT | Mod: PN

## 2025-05-12 RX ORDER — LIDOCAINE AND PRILOCAINE 25; 25 MG/G; MG/G
CREAM TOPICAL
Qty: 30 G | Refills: 2 | Status: SHIPPED | OUTPATIENT
Start: 2025-05-12

## 2025-05-12 RX ORDER — DOXYCYCLINE 100 MG/1
100 CAPSULE ORAL EVERY 12 HOURS
Qty: 14 CAPSULE | Refills: 0 | Status: SHIPPED | OUTPATIENT
Start: 2025-05-12 | End: 2025-05-19

## 2025-05-12 RX ORDER — FAMOTIDINE 10 MG/ML
20 INJECTION, SOLUTION INTRAVENOUS
Status: COMPLETED | OUTPATIENT
Start: 2025-05-12 | End: 2025-05-12

## 2025-05-12 RX ORDER — HEPARIN 100 UNIT/ML
500 SYRINGE INTRAVENOUS
Status: DISCONTINUED | OUTPATIENT
Start: 2025-05-12 | End: 2025-05-12 | Stop reason: HOSPADM

## 2025-05-12 RX ORDER — LEVOTHYROXINE SODIUM 75 UG/1
75 TABLET ORAL
Qty: 90 TABLET | Refills: 1 | Status: SHIPPED | OUTPATIENT
Start: 2025-05-12

## 2025-05-12 RX ORDER — EPINEPHRINE 0.3 MG/.3ML
0.3 INJECTION SUBCUTANEOUS ONCE AS NEEDED
Status: DISCONTINUED | OUTPATIENT
Start: 2025-05-12 | End: 2025-05-12 | Stop reason: HOSPADM

## 2025-05-12 RX ORDER — SODIUM CHLORIDE 0.9 % (FLUSH) 0.9 %
10 SYRINGE (ML) INJECTION
Status: DISCONTINUED | OUTPATIENT
Start: 2025-05-12 | End: 2025-05-12 | Stop reason: HOSPADM

## 2025-05-12 RX ORDER — DIPHENHYDRAMINE HYDROCHLORIDE 50 MG/ML
50 INJECTION, SOLUTION INTRAMUSCULAR; INTRAVENOUS ONCE AS NEEDED
Status: DISCONTINUED | OUTPATIENT
Start: 2025-05-12 | End: 2025-05-12 | Stop reason: HOSPADM

## 2025-05-12 RX ADMIN — PACLITAXEL 96 MG: 6 INJECTION, SOLUTION INTRAVENOUS at 12:05

## 2025-05-12 RX ADMIN — SODIUM CHLORIDE: 9 INJECTION, SOLUTION INTRAVENOUS at 10:05

## 2025-05-12 RX ADMIN — DIPHENHYDRAMINE HYDROCHLORIDE 50 MG: 50 INJECTION INTRAMUSCULAR; INTRAVENOUS at 10:05

## 2025-05-12 RX ADMIN — FAMOTIDINE 20 MG: 10 INJECTION INTRAVENOUS at 12:05

## 2025-05-12 RX ADMIN — CARBOPLATIN 205 MG: 10 INJECTION, SOLUTION INTRAVENOUS at 01:05

## 2025-05-12 RX ADMIN — SODIUM CHLORIDE 0.25 MG: 9 INJECTION, SOLUTION INTRAVENOUS at 11:05

## 2025-05-12 NOTE — PLAN OF CARE
Completed 1 of 30 outpatient daily radiation treatments to the lung without difficulty.  Education provided and patient verbalizes understanding.  All questions and concerns addressed by XRT team this visit.

## 2025-05-12 NOTE — PLAN OF CARE
Problem: Adult Inpatient Plan of Care  Goal: Plan of Care Review  Outcome: Progressing  Flowsheets (Taken 5/12/2025 1405)  Plan of Care Reviewed With:   patient   friend  Goal: Patient-Specific Goal (Individualized)  Outcome: Progressing  Flowsheets (Taken 5/12/2025 1405)  Individualized Care Needs: recliner/warm blankets/snacks/education/friend at chairside  Anxieties, Fears or Concerns: first treatment/ anxiety  Goal: Absence of Hospital-Acquired Illness or Injury  Outcome: Progressing  Goal: Optimal Comfort and Wellbeing  Outcome: Progressing  Goal: Readiness for Transition of Care  Outcome: Progressing     Problem: Fatigue  Goal: Improved Activity Tolerance  Outcome: Progressing  Intervention: Promote Improved Energy  Flowsheets (Taken 5/12/2025 1405)  Fatigue Management:   activity schedule adjusted   fatigue-related activity identified   frequent rest breaks encouraged   paced activity encouraged  Sleep/Rest Enhancement:   noise level reduced   reading promoted   regular sleep/rest pattern promoted   relaxation techniques promoted   room darkened   family presence promoted  Activity Management:   Ambulated -L4   Ambulated to bathroom - L4   Ambulated in roman - L4   Up in stretcher chair - L1  Environmental Support: calm environment promoted   Pt tolerated Taxol/Carbo well today. NAD/no concerns voiced. Reviewed follow-up appointments. All questions were answered, ambulated independently at d/c with friend at side.

## 2025-05-12 NOTE — TELEPHONE ENCOUNTER
Care Due:                  Date            Visit Type   Department     Provider  --------------------------------------------------------------------------------                                EP -                              PRIMARY      [facility]  Last Visit: 11-      CARE ([facility])   MEDICINE       Luana Calvert  Next Visit: None Scheduled  None         None Found                                                            Last  Test          Frequency    Reason                     Performed    Due Date  --------------------------------------------------------------------------------    Lipid Panel.  12 months..  colestipoL...............  05-   05-    Uric Acid...  12 months..  allopurinoL..............  07- 07-    [employer] Embedded Care Due Messages. Reference number: [id number].   5/11/2025 11:57:38 PM CDT   Topical Sulfur Applications Pregnancy And Lactation Text: This medication is considered safe during pregnancy and breast feeding secondary to limited systemic absorption.

## 2025-05-12 NOTE — TELEPHONE ENCOUNTER
Provider Staff:  Action required for this patient    Requires labs      Please see care gap opportunities below in Care Due Message.    Thanks!  [facility]     Appointments      Date Provider   Last Visit   11/6/2024 Luana Calvert MD   Next Visit   Visit date not found Luana Calvert MD     Refill Decision Note   Alessia Nelson  is requesting a refill authorization.    Brief Assessment and Rationale for Refill:   Approve       Medication Therapy Plan:  Acute care/admission documentation reviewed. No change in therapy. Ok to approve.      Extended chart review required: Yes   Comments:     Note composed:2:16 PM 05/12/2025

## 2025-05-13 ENCOUNTER — DOCUMENTATION ONLY (OUTPATIENT)
Dept: REHABILITATION | Facility: HOSPITAL | Age: 70
End: 2025-05-13
Payer: MEDICARE

## 2025-05-13 ENCOUNTER — DOCUMENTATION ONLY (OUTPATIENT)
Dept: HEMATOLOGY/ONCOLOGY | Facility: CLINIC | Age: 70
End: 2025-05-13
Payer: MEDICARE

## 2025-05-13 PROCEDURE — 77386 HC IMRT, COMPLEX: CPT | Mod: PN | Performed by: RADIOLOGY

## 2025-05-13 PROCEDURE — 77014 PR  CT GUIDANCE PLACEMENT RAD THERAPY FIELDS: CPT | Mod: 26,,, | Performed by: RADIOLOGY

## 2025-05-13 NOTE — PROGRESS NOTES
Pt came to my desk to schedule a wig fitting on 5/14/25 at 11:00 am. Location and check-in process were discussed. Pt thanked me for my assistance.

## 2025-05-13 NOTE — PROGRESS NOTES
Chart reviewed for oncology navigational needs.  Patient began her concurrent chemorads yesterday.  Future follow ups already scheduled. No needs noted at this time.  Will continue to monitor throughout treatment.

## 2025-05-14 ENCOUNTER — OFFICE VISIT (OUTPATIENT)
Dept: PSYCHIATRY | Facility: CLINIC | Age: 70
End: 2025-05-14
Payer: MEDICARE

## 2025-05-14 ENCOUNTER — PATIENT MESSAGE (OUTPATIENT)
Dept: HEMATOLOGY/ONCOLOGY | Facility: CLINIC | Age: 70
End: 2025-05-14
Payer: MEDICARE

## 2025-05-14 ENCOUNTER — CLINICAL SUPPORT (OUTPATIENT)
Dept: HEMATOLOGY/ONCOLOGY | Facility: CLINIC | Age: 70
End: 2025-05-14
Payer: MEDICARE

## 2025-05-14 ENCOUNTER — TELEPHONE (OUTPATIENT)
Dept: HEMATOLOGY/ONCOLOGY | Facility: CLINIC | Age: 70
End: 2025-05-14
Payer: MEDICARE

## 2025-05-14 DIAGNOSIS — C34.90 MALIGNANT NEOPLASM OF UNSPECIFIED PART OF UNSPECIFIED BRONCHUS OR LUNG: ICD-10-CM

## 2025-05-14 DIAGNOSIS — F43.22 ADJUSTMENT DISORDER WITH ANXIOUS MOOD: Primary | ICD-10-CM

## 2025-05-14 DIAGNOSIS — C34.31 CANCER OF LOWER LOBE OF RIGHT LUNG: Primary | ICD-10-CM

## 2025-05-14 PROCEDURE — 77386 HC IMRT, COMPLEX: CPT | Mod: PN | Performed by: RADIOLOGY

## 2025-05-14 PROCEDURE — 99211 OFF/OP EST MAY X REQ PHY/QHP: CPT | Mod: PBBFAC,25,PN | Performed by: COUNSELOR

## 2025-05-14 PROCEDURE — 99999 PR PBB SHADOW E&M-EST. PATIENT-LVL I: CPT | Mod: PBBFAC,,, | Performed by: COUNSELOR

## 2025-05-14 PROCEDURE — 77014 PR  CT GUIDANCE PLACEMENT RAD THERAPY FIELDS: CPT | Mod: 26,,, | Performed by: RADIOLOGY

## 2025-05-14 NOTE — TELEPHONE ENCOUNTER
Per Dr William patient needs to see dermatology urgently for rash over port site. Spoke to Crista in dermatology and they are looking to see when can see patient for rash over port site and will call back with availability.

## 2025-05-14 NOTE — PROGRESS NOTES
"4:53 PM    This [facility] met with this pt for a wig appointment today. She tried on several wigs today and chose one she thought she might wear.  The pt expressed several times how this is all new to her and she has never done anything like this before. This SW acknowledged this pt and explained the process of how she can choose a wig, try it on and reassured I would be here every step of the way.  The pt shared some stories from her past involving her two sisters and how they treated her poorly, so they do not speak anymore.  She appeared to be coping well with her diagnosis, but was interested in sharing details of her past, childhood and her career. She shared being close to her father and thought she was the "favorered child" of his which her mother and sister were jealous of at times.  The pt said her father passed when she was only 20 and she has felt alone in her family since then.   She said she has an appointment today with Dr. Soliz.  The pt was pleasant and expressed her gratitude for my kindness an listening ear today. I reminded her we are here to support her throughout this journey.        "

## 2025-05-14 NOTE — PROGRESS NOTES
PSYCHO-ONCOLOGY INTAKE    Diagnostic Interview - CPT 88801    Date: 5/14/2025  Site: [facility]    Evaluation Length (direct face-to-face time):  1.5 hours    This includes face to face time and non-face to face time preparing to see the patient, obtaining and/or reviewing separately obtained history, documenting clinical information in the electronic or other health record, independently interpreting results and communicating results to the patient/family/caregiver, or care coordinator.     Referral Source: Sea William MD   Oncologist:   PCP: Luana Calvert MD    Clinical status of patient: Outpatient    Alessia Nelson, a 69 y.o. female, seen for initial evaluation visit.    Alessia Nelson reviewed and agreed to informed consent and the limits of confidentiality.    Chief complaint/reason for encounter: adjustment to illness, anxiety    History of Present Illness:     Medical/Surgical History:    Patient Active Problem List   Diagnosis    CHF, chronic    Hypertension    COPD (chronic obstructive pulmonary disease)    Nicotine dependence    Diarrhea, chronic    Allergic rhinitis    Mild episode of recurrent major depressive disorder    Fibromyalgia    GERD (gastroesophageal reflux disease)    Hypothyroidism    Hyperlipidemia    History of malignant neoplasm of skin    MEN (multiple endocrine neoplasia)    Peripheral neuropathy    Poikiloderma    Pseudophakia    Rotator cuff injury    Tendinosis    Metatarsalgia    Dry eyes    Class 1 obesity due to excess calories with serious comorbidity and body mass index (BMI) of 30.0 to 30.9 in adult, today 29.1    Chronic bilateral low back pain without sciatica    Contusion of tail of pancreas    Bile duct abnormality    Gastritis    Lumbosacral spondylosis    S/P drug eluting coronary stent placement, 8/2017    Family history of premature CAD    Syncope and collapse, onset 2015, about 10 times, one episode noted hypotension at doctor office 9/2017     Multiple falls, started 9/2018, 5 times usually on standing    Excessive caffeine intake    Orthostatic hypotension    Abdominal obesity    Aspirin intolerance    LVH (left ventricular hypertrophy) due to hypertensive disease, with heart failure    Chronic diarrhea    History of cholecystectomy    Diverticulosis of large intestine without hemorrhage    Abnormal computerized tomography of biliary tract    History of pancreatitis    Primary osteoarthritis of right knee    Degenerative tear of medial meniscus of right knee    Baker's cyst of knee, right    Chronic pain of right knee    Unilateral primary osteoarthritis, right knee    CAD (coronary artery disease)    History of TIA (transient ischemic attack)    Status post right knee replacement    Anemia    Bradycardia    Chronic pancreatitis    Colon cancer screening    B12 deficiency    History of Clostridium difficile infection    Abnormal finding on GI tract imaging    Pancreatic insufficiency    Degenerative disc disease, lumbar    Current smoker    Failed back syndrome of lumbar spine    Activity intolerance    Sacroiliitis    Hypothyroidism due to acquired atrophy of thyroid    Pain of lumbar facet joint    Non-refractory chronic migraine without aura    Lumbar radiculopathy    Isolated cervical dystonia    Gout    Generalized anxiety disorder    Stage 3b chronic kidney disease    Pharyngoesophageal dysphagia    Hoarseness, persistent    Chronic bronchitis    Bilateral carpal tunnel syndrome    Major depressive disorder, single episode, moderate    Aortic atherosclerosis    Impaired functional mobility and activity tolerance    Myofascial pain syndrome of lumbar spine    Myofascial pain syndrome, cervical    Solitary pulmonary nodule    Irritable bowel syndrome with diarrhea    Cancer of lower lobe of right lung    Malignant neoplasm of unspecified part of unspecified bronchus or lung    Rash       Health Behaviors:       ETOH Use: Denied        Tobacco Use:  Denied: quiet cigarette smoking approximately 2 years ago; previously smoked for 50 years    Illicit Drug Use:  Denied      Prescription Misuse: Denied    Caffeine: minimal    Exercise:The patient is actively working to return to baseline exercise tolerance.   Firearms:  Endorsed; safety on    Advanced directives: Endorsed     Family History:   Psychiatric illness: Denied     Alcohol/Drug Abuse: Sisters: undiagnosed alcohol use     Suicide:  Denied     Past Psychiatric History:   Inpatient treatment: Yes; for several days after suicide attempt several years ago. Diagnosed with MDD.        Suicide Attempts: Endorsed: attempted with a firearm and received medical care followed by inpatient treatment and psychiatry medication management. Medication currently managed by PCP.         Psychotropic Medications: medical marijuana to help with pain        Past: none    Current medications as per below, allergies reviewed in chart.    Current Outpatient Medications   Medication    acetaminophen (TYLENOL) 325 MG tablet    allopurinoL (ZYLOPRIM) 300 MG tablet    amLODIPine (NORVASC) 5 MG tablet    busPIRone (BUSPAR) 5 MG Tab    calcium carbonate (CALCIUM 500) 500 mg calcium (1,250 mg) chewable tablet    clopidogreL (PLAVIX) 75 mg tablet    colestipoL (COLESTID) 1 gram Tab    cyanocobalamin 1,000 mcg/mL injection    doxycycline (VIBRAMYCIN) 100 MG Cap    EScitalopram oxalate (LEXAPRO) 20 MG tablet    fluticasone propionate (FLONASE) 50 mcg/actuation nasal spray    furosemide (LASIX) 20 MG tablet    gabapentin (NEURONTIN) 400 MG capsule    hydrALAZINE (APRESOLINE) 25 MG tablet    levothyroxine (SYNTHROID) 75 MCG tablet    LIDOcaine-prilocaine (EMLA) cream    magnesium oxide (MAG-OX) 400 mg (241.3 mg magnesium) tablet    methocarbamoL (ROBAXIN) 750 MG Tab    methylPREDNISolone (MEDROL DOSEPACK) 4 mg tablet    metoprolol succinate (TOPROL-XL) 50 MG 24 hr tablet    mupirocin (BACTROBAN) 2 % ointment    OLANZapine (ZYPREXA) 5 MG  tablet    ondansetron (ZOFRAN) 8 MG tablet    oxyCODONE-acetaminophen (PERCOCET) 5-325 mg per tablet    pantoprazole (PROTONIX) 40 MG tablet    potassium chloride SA (K-DUR,KLOR-CON M) 10 MEQ tablet    rosuvastatin (CRESTOR) 10 MG tablet    traMADoL (ULTRAM) 50 mg tablet    traZODone (DESYREL) 100 MG tablet    triamcinolone acetonide 0.1% (KENALOG) 0.1 % ointment    vit A/vit C/vit E/zinc/copper (PRESERVISION AREDS ORAL)    vitamin D (VITAMIN D3) 1000 units Tab    ZENPEP 40,000-126,000- 168,000 unit CpDR     No current facility-administered medications for this visit.     Facility-Administered Medications Ordered in Other Visits   Medication Frequency    oxyCODONE immediate release tablet 5 mg Once PRN        Social situation/Stressors: Alessia Nelson lives alone in [place], [place].  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  Alessia Nelson is single and has no children. Pt reports a strained relationship with her two sisters, though she maintains positive and supportive connections with her nieces and nephews. Overall, social support is limited. Medical history is complex, with ongoing management of multiple conditions and associated side effects contributing to psychosocial stress. Alessia Nelson is an active member of a [employer].      Strengths:Able to vocalize needs, Values and traditions, Motivation, readiness for change, Setting and pursuing goals, hopes, dreams, aspirations, and Cultural/spiritual/Gnosticism and community involvement  Liabilities: Complicated medical illness and Lack of social supports    Current Evaluation:     Mental Status Exam: Alessia Nelson arrived several minutes late due for the assessment session.  The patient was fully cooperative throughout the interview and was an adequate historian   Appearance: age appropriate, appropriately  dressed, adequately  groomed  Behavior/Cooperation: friendly and cooperative  Speech:  normal in rate, volume, and tone and appropriate quality, quantity and organization of sentences  Mood: steady  Affect: mood congruent  Thought Process: goal-directed, logical  Thought Content: normal, no suicidality, no homicidality, delusions, or paranoia;did not appear to be responding to internal stimuli during the interview.   Orientation: grossly intact  Memory: grossly intact  Attention Span/Concentration: Attends to interview without distraction; reports no difficulty  Fund of Knowledge: average  Estimate of Intelligence: average from verbal skills and history  Cognition: grossly intact  Insight: patient has awareness of illness; good insight into own behavior and behavior of others  Judgment: the patient's behavior is adequate to circumstances      Adjustment Assessment to Current Illness:    Oncology History   Cancer of lower lobe of right lung   3/14/2025 Initial Diagnosis    Cancer of lower lobe of right lung     3/14/2025 Cancer Staged    Staging form: Lung, AJCC V9  - Clinical stage from 3/14/2025: cT1c, cN2(f), cM0     5/12/2025 -  Chemotherapy    Treatment Summary   Plan Name: OP NSCLC PACLitaxel CARBOplatin QW + radiation  Treatment Goal: Curative  Status: Active  Start Date: 5/12/2025  End Date: 6/23/2025 (Planned)  Provider: Sea William MD  Chemotherapy: CARBOplatin (PARAPLATIN) 205 mg in 0.9% NaCl 305.5 mL chemo infusion, 200 mg, Intravenous, Clinic/HOD 1 time, 1 of 7 cycles  Administration: 205 mg (5/12/2025)  PACLitaxeL (TAXOL) 45 mg/m2 = 96 mg in 0.9% NaCl 250 mL chemo infusion, 45 mg/m2 = 90 mg, Intravenous, Clinic/HOD 1 time, 1 of 7 cycles  Administration: 96 mg (5/12/2025)           Alessia Nelson has adjusted to illness with moderate difficulty primarily through passive coping strategies. She has engaged in appropriate information gathering.  The patient has limited family/friend support.  Her support system is coping well with the diagnosis/treatment/prognosis. Illness-related  psychosocial stressors include financial strain , absence from work, and changes in ability to engage in leisure activities.  The patient has a good partnership with her [facility] treatment team. The patient reports the following barriers to cancer care:lack of family support.     NCCN Distress thermometer:       5/16/2025     9:08 AM 5/8/2025    11:17 AM 5/8/2025    11:16 AM 4/28/2025     2:06 PM 4/21/2025     1:05 PM 4/21/2025     9:08 AM 4/21/2025     9:00 AM   DISTRESS SCREENING   Distress Score 5 0 - No Distress 0 - No Distress 0 - No Distress 6 7 0 - No Distress   Practical Concerns None of these  None of these None of these None of these     Social Concerns None of these  None of these None of these None of these     Emotional Concerns None of these Worry or anxiety None of these None of these Worry or anxiety Worry or anxiety    Spiritual or Anabaptism Concerns None of these  None of these None of these None of these     Physical Concerns Pain;Fatigue  None of these Fatigue Fatigue;Pain Pain    Other Problems     BACK AND HIP PAIN          Symptoms:   Mood: diminished interest, fatigue, and appetite changes;  prior depression:situational depression in 2010s after being laid off from her job and family strain; no SI/HI  Anxiety: Feeling nervous, anxious, or on edge, Uncontrollable worry (about health), Excessive worry (interfering with interpersonal and social functioning), and Difficulty relaxing; anxiety throughout adulthood  Substance abuse: denied  Cognitive functioning: denied  Health behaviors: non contributory   Sleep: No significant challenges noted, experiences fatigue related to cancer treatment   Pain: Ms. Nelson reports chronic pain secondary to degenerative disc disease and sacroiliitis. She has been engaged with pain management since her early twenties. She describes the pain in her yoandy has burning and sharp.         5/16/2025   PHQ-9 Depression Patient Health Questionnaire    Over the last two weeks how often have you been bothered by little interest or pleasure in doing things 1       Over the last two weeks how often have you been bothered by feeling down, depressed or hopeless 0       Over the last two weeks how often have you been bothered by trouble falling or staying asleep, or sleeping too much 0   Over the last two weeks how often have you been bothered by feeling tired or having little energy 3   Over the last two weeks how often have you been bothered by a poor appetite or overeating 3   Over the last two weeks how often have you been bothered by feeling bad about yourself - or that you are a failure or have let yourself or your family down 0   Over the last two weeks how often have you been bothered by trouble concentrating on things, such as reading the newspaper or watching television 0   Over the last two weeks how often have you been bothered by moving or speaking so slowly that other people could have noticed. 0   Over the last two weeks how often have you been bothered by thoughts that you would be better off dead, or of hurting yourself 0   PHQ-9 Score 7       Multiple values from one day are sorted in reverse-chronological order          5/16/2025     9:51 AM   JIM-7   Was test performed? Yes   1. Feeling nervous, anxious, or on edge? Several days   2. Not being able to stop or control worrying? Several days   3. Worrying too much about different things? Several days   4. Trouble relaxing? Several days   5. Being so restless that it is hard to sit still? Not at all   6. Becoming easily annoyed or irritable? Not at all   7. Feeling afraid as if something awful might happen? Not at all   8. If you checked off any problems, how difficult have these problems made it for you to do your work, take care of things at home, or get along with other people? Not difficult at all   JIM-7 Score 4   Number answered (out of first 7) 7   Interpretation Normal       Assessment -  Diagnosis - Goals:       ICD-10-CM ICD-9-CM   1. Adjustment disorder with anxious mood  F43.22 309.24   2. Malignant neoplasm of unspecified part of unspecified bronchus or lung  C34.90 162.9         Plan:individual psychotherapy and medication management by physician    Summary and Recommendations  Alessia Nelson is a 69 y.o. female referred by Sea William MD for psychological evaluation and treatment.  Ms. Nelson appears to be having moderate difficulty coping with her diagnosis and proposed treatment course. Pt appears to be experiencing symptoms consistent with adjustment disorder with anxious mood. Her depressive symptoms seem better explained in the context of her cancer treatment, particularly fatigue and appetite changes. She described having limited social support and shared that she hasnt fully processed her cancer journey. Psychosocial factors contributing to her symptoms include a complicated medical history and a strained relationship with her sisters. Pt would likely benefit from supportive therapy and narrative therapy to help with emotional processing and to build adaptive coping skills related to her cancer experience. She was in agreement with this plan and will follow up with me for that reason.    Return to clinic: 3 weeks    GOALS:   Understand and process cancer experience   Develop adaptive cancer coping skills     Tiffanie Soliz Psy.D.   Clinical Health Psychology Fellow

## 2025-05-15 ENCOUNTER — TELEPHONE (OUTPATIENT)
Dept: HEMATOLOGY/ONCOLOGY | Facility: CLINIC | Age: 70
End: 2025-05-15
Payer: MEDICARE

## 2025-05-15 ENCOUNTER — TELEPHONE (OUTPATIENT)
Facility: CLINIC | Age: 70
End: 2025-05-15
Payer: MEDICARE

## 2025-05-15 NOTE — TELEPHONE ENCOUNTER
----- Message from LORRI Boyce sent at 5/14/2025  4:53 PM CDT -----  Regarding: FW: Appt  Contact: Lisa [phone]    ----- Message -----  From: Estefany Belcher  Sent: 5/14/2025   2:23 PM CDT  To: [facility] Clinical Staff  Subject: Appt                                             Lisa/ Dr Brown is calling to get a urgent appt for a rash no dates avail please call

## 2025-05-15 NOTE — TELEPHONE ENCOUNTER
----- Message from Carla Dillard sent at 1/5/2018 12:06 PM CST -----  [id number]  Would like to consult with nurse regarding paperwork. Please call back at [phone].      Thanks,  Carla Dillard     Advised patient  that we currently do not have any new patient appointments available at the [place] [facility]. Encouraged patient to reach out to appointment desk for scheduling at alternate campus, patient refused.

## 2025-05-15 NOTE — TELEPHONE ENCOUNTER
Was able to get patient in for dermatology eval at Dr Delgadillo office today around 11am. Patient notified and agreed to appt.

## 2025-05-16 ENCOUNTER — LAB VISIT (OUTPATIENT)
Dept: LAB | Facility: HOSPITAL | Age: 70
End: 2025-05-16
Attending: INTERNAL MEDICINE
Payer: MEDICARE

## 2025-05-16 ENCOUNTER — OFFICE VISIT (OUTPATIENT)
Dept: HEMATOLOGY/ONCOLOGY | Facility: CLINIC | Age: 70
End: 2025-05-16
Payer: MEDICARE

## 2025-05-16 VITALS
DIASTOLIC BLOOD PRESSURE: 74 MMHG | HEART RATE: 71 BPM | TEMPERATURE: 98 F | WEIGHT: 194.44 LBS | BODY MASS INDEX: 30.45 KG/M2 | OXYGEN SATURATION: 97 % | SYSTOLIC BLOOD PRESSURE: 149 MMHG

## 2025-05-16 DIAGNOSIS — C34.31 CANCER OF LOWER LOBE OF RIGHT LUNG: Primary | ICD-10-CM

## 2025-05-16 DIAGNOSIS — K86.89 PANCREATIC INSUFFICIENCY: Chronic | ICD-10-CM

## 2025-05-16 DIAGNOSIS — C34.31 CANCER OF LOWER LOBE OF RIGHT LUNG: ICD-10-CM

## 2025-05-16 DIAGNOSIS — E03.4 HYPOTHYROIDISM DUE TO ACQUIRED ATROPHY OF THYROID: ICD-10-CM

## 2025-05-16 DIAGNOSIS — R19.7 DIARRHEA, UNSPECIFIED TYPE: ICD-10-CM

## 2025-05-16 DIAGNOSIS — R21 RASH: ICD-10-CM

## 2025-05-16 DIAGNOSIS — E31.20 MEN (MULTIPLE ENDOCRINE NEOPLASIA): Chronic | ICD-10-CM

## 2025-05-16 LAB
ABSOLUTE EOSINOPHIL (OHS): 0.27 K/UL
ABSOLUTE MONOCYTE (OHS): 0.26 K/UL (ref 0.3–1)
ABSOLUTE NEUTROPHIL COUNT (OHS): 7.14 K/UL (ref 1.8–7.7)
ALBUMIN SERPL BCP-MCNC: 3.7 G/DL (ref 3.5–5.2)
ALP SERPL-CCNC: 83 UNIT/L (ref 40–150)
ALT SERPL W/O P-5'-P-CCNC: 14 UNIT/L (ref 10–44)
ANION GAP (OHS): 12 MMOL/L (ref 8–16)
AST SERPL-CCNC: 13 UNIT/L (ref 11–45)
BASOPHILS # BLD AUTO: 0.02 K/UL
BASOPHILS NFR BLD AUTO: 0.2 %
BILIRUB SERPL-MCNC: 0.5 MG/DL (ref 0.1–1)
BUN SERPL-MCNC: 18 MG/DL (ref 8–23)
CALCIUM SERPL-MCNC: 10.6 MG/DL (ref 8.7–10.5)
CHLORIDE SERPL-SCNC: 105 MMOL/L (ref 95–110)
CO2 SERPL-SCNC: 24 MMOL/L (ref 23–29)
CREAT SERPL-MCNC: 1 MG/DL (ref 0.5–1.4)
ERYTHROCYTE [DISTWIDTH] IN BLOOD BY AUTOMATED COUNT: 15.9 % (ref 11.5–14.5)
GFR SERPLBLD CREATININE-BSD FMLA CKD-EPI: >60 ML/MIN/1.73/M2
GLUCOSE SERPL-MCNC: 120 MG/DL (ref 70–110)
HCT VFR BLD AUTO: 37.2 % (ref 37–48.5)
HGB BLD-MCNC: 11.7 GM/DL (ref 12–16)
IMM GRANULOCYTES # BLD AUTO: 0.03 K/UL (ref 0–0.04)
IMM GRANULOCYTES NFR BLD AUTO: 0.3 % (ref 0–0.5)
LYMPHOCYTES # BLD AUTO: 1.26 K/UL (ref 1–4.8)
MCH RBC QN AUTO: 29 PG (ref 27–31)
MCHC RBC AUTO-ENTMCNC: 31.5 G/DL (ref 32–36)
MCV RBC AUTO: 92 FL (ref 82–98)
NUCLEATED RBC (/100WBC) (OHS): 0 /100 WBC
PLATELET # BLD AUTO: 178 K/UL (ref 150–450)
PMV BLD AUTO: 8.7 FL (ref 9.2–12.9)
POTASSIUM SERPL-SCNC: 4.2 MMOL/L (ref 3.5–5.1)
PROT SERPL-MCNC: 6.9 GM/DL (ref 6–8.4)
RBC # BLD AUTO: 4.04 M/UL (ref 4–5.4)
RELATIVE EOSINOPHIL (OHS): 3 %
RELATIVE LYMPHOCYTE (OHS): 14 % (ref 18–48)
RELATIVE MONOCYTE (OHS): 2.9 % (ref 4–15)
RELATIVE NEUTROPHIL (OHS): 79.6 % (ref 38–73)
SODIUM SERPL-SCNC: 141 MMOL/L (ref 136–145)
WBC # BLD AUTO: 8.98 K/UL (ref 3.9–12.7)

## 2025-05-16 PROCEDURE — 36415 COLL VENOUS BLD VENIPUNCTURE: CPT | Mod: PN

## 2025-05-16 PROCEDURE — 82040 ASSAY OF SERUM ALBUMIN: CPT | Mod: PN

## 2025-05-16 PROCEDURE — 85025 COMPLETE CBC W/AUTO DIFF WBC: CPT | Mod: PN

## 2025-05-16 PROCEDURE — 99999 PR PBB SHADOW E&M-EST. PATIENT-LVL V: CPT | Mod: PBBFAC,,, | Performed by: NURSE PRACTITIONER

## 2025-05-16 PROCEDURE — 99215 OFFICE O/P EST HI 40 MIN: CPT | Mod: PBBFAC,PN | Performed by: NURSE PRACTITIONER

## 2025-05-16 RX ORDER — DIPHENHYDRAMINE HYDROCHLORIDE 50 MG/ML
50 INJECTION, SOLUTION INTRAMUSCULAR; INTRAVENOUS ONCE AS NEEDED
OUTPATIENT
Start: 2025-05-19

## 2025-05-16 RX ORDER — MUPIROCIN 20 MG/G
OINTMENT TOPICAL DAILY
COMMUNITY
Start: 2025-05-15

## 2025-05-16 RX ORDER — EPINEPHRINE 0.3 MG/.3ML
0.3 INJECTION SUBCUTANEOUS ONCE AS NEEDED
OUTPATIENT
Start: 2025-05-19

## 2025-05-16 RX ORDER — HEPARIN 100 UNIT/ML
500 SYRINGE INTRAVENOUS
OUTPATIENT
Start: 2025-05-19

## 2025-05-16 RX ORDER — TRIAMCINOLONE ACETONIDE 1 MG/G
OINTMENT TOPICAL 2 TIMES DAILY
COMMUNITY
Start: 2025-05-15

## 2025-05-16 RX ORDER — FAMOTIDINE 10 MG/ML
20 INJECTION, SOLUTION INTRAVENOUS
OUTPATIENT
Start: 2025-05-19

## 2025-05-16 RX ORDER — SODIUM CHLORIDE 0.9 % (FLUSH) 0.9 %
10 SYRINGE (ML) INJECTION
OUTPATIENT
Start: 2025-05-19

## 2025-05-16 NOTE — PROGRESS NOTES
PATIENT: Alessia Nelson  MRN: [id number]  DATE: 5/16/2025      Diagnosis:   1. Cancer of lower lobe of right lung    2. MEN (multiple endocrine neoplasia)    3. Hypothyroidism due to acquired atrophy of thyroid    4. Pancreatic insufficiency    5. Rash          Chief Complaint: Lung Cancer (Treatment clearance)      Subjective:    Interval History:  Ms. Nelson is a 69 y.o. female with CAD, CHF, COPD, depression, GERD, HTN, HLD, Seizures, h/o CVA, thyroid disease who presents for NSCLC.  Since the last clinic visit the patient started radiation on 5/12/25 and received her first cycle of carbo/taxol on Monday as well. Patient states she overall feels ok. Patient continues to have headaches. States she started having diarrhea this morning, but she does have chronic diarrhea from a bowel resection 38 years ago. She is taking colestipol with effectiveness.  She endorses numbness in bilateral feet which has been chronic. After having her port placed, she started having a erythematous, papular rash surrounding where she had her port placed. Port was not used for first dose of chemo. She saw dermatology on 5/15/25. She is currently on doxycycline, mupirocin and triamcinolone. The patient denies CP, cough, SOB, abdominal pain, nausea, vomiting, constipation, diarrhea.  The patient denies fever, chills, night sweats, weight loss, new lumps or bumps, easy bruising or bleeding.    Prior History:  The patient was being followed for a nodule in the right lower lobe and had PET-CT on 05/22/2024 which showed no avidity.  CT chest on 11/11/2024 showed enlargement of the ground-glass right lower lobe lung nodule measuring 18 x 19 mm and small bilateral soft tissue pulmonary nodules measuring 3-8 mm.  The patient underwent robotic bronchoscopy and EBUS on 02/20/2025 with pathology showing squamous cell carcinoma and biopsy of right lower lung nodule and a positive station 7 lymph node.  Stations 4R, 11R S and 11 RI were negative.   TEMPUS tissue testing showed mutations in CDKN2A, FAT1, and TP53.  PD-L1 was <1%.  The patient underwent CT myelogram and MRI of the thoracic spine 03/13/2025 for chronic upper back pain with radiation down bilateral arms.   CT thoracic spine on 03/13/2025 report obtained showing stable expansile mass of the superior thoracic cord extending from T2 through T4 measuring 55 mm in length by 10 mm medial lateral and 9 mm AP.  MRI T-spine on 03/13/2025 showed expansile mass in the thoracic cord concerning for potential ependymoma versus astrocytoma.   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  Dr Staples 4/15/25 felt the patient needed fenestration with neurosurgery.     Past Medical History:   Past Medical History:   Diagnosis Date    Acute pancreatitis     Anticoagulant long-term use     Back pain     CAD (coronary artery disease)     CHF (congestive heart failure)     COPD (chronic obstructive pulmonary disease)     Depression     Diverticulosis     Encounter for blood transfusion     GERD (gastroesophageal reflux disease)     History of blood clots     1986 AFTER BOWEL RESCETION    History of bowel resection     Hyperlipidemia     Hypertension     Malignant neoplasm of unspecified part of unspecified bronchus or lung     Peritonitis     PONV (postoperative nausea and vomiting)     Pulmonary nodule     Seizures     HAD A SEIZURE FROM PHENERGAN     Stroke     left sided weakness    Thyroid disease     TIA (transient ischemic attack)     Wears dentures     upper       Past Surgical HIstory:   Past Surgical History:   Procedure Laterality Date    ADRENAL GLAND SURGERY      APPENDECTOMY      BACK SURGERY      CAUDAL EPIDURAL STEROID INJECTION N/A 03/01/2021    Procedure: Injection-steroid-epidural-caudal;  Surgeon: Socorro Lorenz MD;  Location:  [facility] OR;  Service: Pain Management;  Laterality: N/A;    CHOLECYSTECTOMY      COLONOSCOPY      COLONOSCOPY N/A 06/10/2021    Procedure: COLONOSCOPY;  Surgeon: Sheree Metz MD;  Location: [facility] ENDO;  Service: Endoscopy;  Laterality: N/A;    COLONOSCOPY N/A 03/08/2022    Procedure: COLONOSCOPY;  Surgeon: Maurilio Rodriguez MD;  Location: [facility] (2ND FLR);  Service: Endoscopy;  Laterality: N/A;  Pt to perform at-home COVID test morning of procedure-DS  [facility]see [facility] tab 2/[facility]DS  3/2-Instructions sent via email-DS  3/7-Pt unable to come earlier due to ride-DS    CORONARY STENT PLACEMENT      CYSTOURETHROSCOPY N/A 11/13/2019    Procedure: CYSTOURETHROSCOPY;  Surgeon: Shun Nuñez MD;  Location: [facility] OR;  Service: Urology;  Laterality: N/A;    DIRECT DIAGNOSTIC LARYNGOSCOPY WITH BRONCHOSCOPY AND ESOPHAGOSCOPY N/A 01/03/2024    Procedure: LARYNGOSCOPY, DIRECT, DIAGNOSTIC, WITH BRONCHOSCOPY AND ESOPHAGOSCOPY;  Surgeon: Mir Belle MD;  Location: [facility] OR;  Service: ENT;  Laterality: N/A;    ENDOBRONCHIAL ULTRASOUND Bilateral 2/20/2025    Procedure: ENDOBRONCHIAL ULTRASOUND (EBUS);  Surgeon: Ismael Cooper MD;  Location: [facility] OR;  Service: Pulmonary;  Laterality: Bilateral;    ENDOSCOPIC ULTRASOUND OF LOWER GASTROINTESTINAL TRACT N/A 03/08/2022    Procedure: ULTRASOUND, LOWER GI TRACT, ENDOSCOPIC;  Surgeon: Maurilio Rodriguez MD;  Location: [facility] (2ND FLR);  Service: Endoscopy;  Laterality: N/A;  Pt to perform at-home COVID test morning of procedure-DS  2[facility]24-[facility]see letters tab 2/24/22[facility]DS  3/2-Instructions sent via email-DS  3/7-Pt unable to come earlier due to ride-DS    ENDOSCOPIC ULTRASOUND OF UPPER GASTROINTESTINAL TRACT N/A 11/25/2019    Procedure: ULTRASOUND, UPPER GI TRACT, ENDOSCOPIC;  Surgeon: Alhaji Bridges MD;  Location: [facility] (2ND FLR);  Service: Endoscopy;  Laterality: N/A;  5 day hold Plavix,  Dr Jovanni Serrano - pg  PM prep    ENDOSCOPIC ULTRASOUND OF UPPER GASTROINTESTINAL TRACT N/A 11/02/2020    Procedure: ULTRASOUND, UPPER GI TRACT, ENDOSCOPIC;  Surgeon: Maurilio Rodriguez MD;  Location: [facility] ([address] [place] [address]);  Service: Endoscopy;  Laterality: N/A;  5 day hold Plavix, Dr Roman Walsh - pg  Covid-19 test 10/30/20 [facility]    EPIDURAL STEROID INJECTION INTO CERVICAL SPINE N/A 12/08/2021    Procedure: Injection-steroid-epidural-cervical;  Surgeon: Socorro Lorenz MD;  Location: [facility];  Service: Pain Management;  Laterality: N/A;    EPIDURAL STEROID INJECTION INTO CERVICAL SPINE N/A 11/30/2023    Procedure: Injection-steroid-epidural-cervical;  Surgeon: Maurilio Carroll MD;  Location: [facility];  Service: Anesthesiology;  Laterality: N/A;  c7-T1    ESOPHAGOGASTRODUODENOSCOPY N/A 06/10/2021    Procedure: EGD (ESOPHAGOGASTRODUODENOSCOPY);  Surgeon: Sheree Metz MD;  Location: [facility];  Service: Endoscopy;  Laterality: N/A;    ESOPHAGOGASTRODUODENOSCOPY N/A 09/26/2024    Procedure: EGD (ESOPHAGOGASTRODUODENOSCOPY);  Surgeon: Jovanny Saldivar MD;  Location: [facility];  Service: Endoscopy;  Laterality: N/A;    ESOPHAGOSCOPY, USING BOUGIE, WITH DILATION N/A 01/03/2024    Procedure: ESOPHAGOSCOPY, USING BOUGIE, WITH DILATION;  Surgeon: Mir Belle MD;  Location: [facility] OR;  Service: ENT;  Laterality: N/A;    FRACTURE SURGERY Left 05/02/2022    ankle    HERNIA REPAIR      HYSTERECTOMY      INCISIONAL HERNIA REPAIR      INJECTION OF ANESTHETIC AGENT AROUND NERVE Right 05/27/2019    Procedure: RIGHT L5-S3 MEDIAL BRANCH BLOCKS;  Surgeon: Sneha Aragon MD;  Location: [facility] OR;  Service: Pain Management;  Laterality: Right;  **DO NOT STOP PLAVIX**    INJECTION OF JOINT Right 09/22/2021    Procedure: Injection, Joint; Right SI Joint Injection;  Surgeon: Socorro Lorenz MD;  Location: [facility] OR;  Service: Pain Management;  Laterality: Right;  Oral    INSERTION OF DORSAL COLUMN NERVE STIMULATOR FOR TRIAL N/A  06/07/2021    Procedure: INSERTION, NEUROSTIMULATOR, SPINAL CORD, DORSAL COLUMN, FOR TRIAL;  Surgeon: Socorro Lorenz MD;  Location: [facility] OR;  Service: Pain Management;  Laterality: N/A;  nevro rep confirmed start time    INSERTION OF TUNNELED CENTRAL VENOUS CATHETER (CVC) WITH SUBCUTANEOUS PORT N/A 5/7/2025    Procedure: [id number];  Surgeon: Janet Jarrett MD;  Location: [facility] OR;  Service: General;  Laterality: N/A;    instestine      bowel section    KNEE ARTHROPLASTY Right 12/18/2019    Procedure: ARTHROPLASTY, KNEE;  Surgeon: Ike Briceño II, MD;  Location: [facility] OR;  Service: Orthopedics;  Laterality: Right;    KNEE SURGERY  1983    OOPHORECTOMY      PARATHYROID GLAND SURGERY      3 surgeries    RADIOFREQUENCY ABLATION Right 06/10/2019    Procedure: Radiofrequency Ablation - RIGHT L3-5 RADIOFREQUENCY ABLATION WITH Informa[employer]ARD COOLIEF THERMAL SYSTEM;  Surgeon: Sneha Aragon MD;  Location: [facility] OR;  Service: Pain Management;  Laterality: Right;  **HOLD PLAVIX x 7 DAYS PRIOR**    REVISION OF KNEE ARTHROPLASTY Right     AVALA with Dr Singh    ROBOTIC BRONCHOSCOPY Bilateral 2/20/2025    Procedure: ROBOTIC BRONCHOSCOPY;  Surgeon: Ismael Cooper MD;  Location: [facility] OR;  Service: Pulmonary;  Laterality: Bilateral;    SPINAL CORD STIMULATOR REMOVAL N/A 02/21/2024    Procedure: REMOVAL, NEUROSTIMULATOR, SPINAL;  Surgeon: Maurilio Carroll MD;  Location: [facility] OR;  Service: Pain Management;  Laterality: N/A;  removal of spinal cord stimulator    UPPER GASTROINTESTINAL ENDOSCOPY         Family History:   Family History   Problem Relation Name Age of Onset    Stroke Maternal Grandmother      Cancer Maternal Grandfather      Stroke Mother      Heart disease Father      Breast cancer Sister         Social History:  reports that she quit smoking about 2 years ago. Her smoking use included cigarettes. She started smoking about 48 years ago. She has a 46 pack-year smoking history. She has never used smokeless  "tobacco. She reports that she does not currently use alcohol. She reports current drug use.    Allergies:  Review of patient's allergies indicates:   Allergen Reactions    Aspirin Other (See Comments)     "Makes stomach feel like it's on fire"    Phenergan [promethazine] Other (See Comments)     SEIZURES    Nickel     Lortab [hydrocodone-acetaminophen] Itching     Able to take generic Norco       Medications:  Current Medications[1]    Review of Systems   Constitutional:  Negative for chills, fatigue, fever and unexpected weight change.   Respiratory:  Negative for cough and shortness of breath.    Cardiovascular:  Negative for chest pain and palpitations.   Gastrointestinal:  Positive for diarrhea. Negative for abdominal pain, constipation, nausea and vomiting.   Musculoskeletal:  Positive for back pain.   Skin:  Positive for rash (rigth chest wall).   Neurological:  Positive for numbness (bilateral feet) and headaches.   Hematological:  Negative for adenopathy. Does not bruise/bleed easily.       ECOG Performance Status: 2   Objective:      Vitals:   Vitals:    05/16/25 0904   BP: (!) 149/74   BP Location: Right arm   Patient Position: Sitting   Pulse: 71   Temp: 97.6 °F (36.4 °C)   TempSrc: Temporal   SpO2: 97%   Weight: 88.2 kg (194 lb 7.1 oz)       Wt Readings from Last 3 Encounters:   05/16/25 88.2 kg (194 lb 7.1 oz)   05/12/25 88.6 kg (195 lb 5.2 oz)   05/08/25 91.1 kg (200 lb 13.4 oz)       Physical Exam  Constitutional:       General: She is not in acute distress.     Appearance: She is well-developed. She is not diaphoretic.   HENT:      Head: Normocephalic and atraumatic.   Cardiovascular:      Rate and Rhythm: Normal rate and regular rhythm.      Heart sounds: Normal heart sounds. No murmur heard.  Pulmonary:      Effort: Pulmonary effort is normal. No respiratory distress.      Breath sounds: Normal breath sounds. No wheezing.   Abdominal:      General: Bowel sounds are normal. There is no distension. "      Palpations: Abdomen is soft.      Tenderness: There is no abdominal tenderness. There is no guarding.   Lymphadenopathy:      Cervical: No cervical adenopathy.      Upper Body:      Right upper body: No supraclavicular or axillary adenopathy.      Left upper body: No supraclavicular or axillary adenopathy.   Skin:     Findings: Erythema and rash (papular, mildly pruritic) present.      Comments: See picture below   Neurological:      Mental Status: She is alert and oriented to person, place, and time.   Psychiatric:         Behavior: Behavior normal.         Laboratory Data:  Lab Visit on 05/16/2025   Component Date Value Ref Range Status    Sodium 05/16/2025 141  136 - 145 mmol/L Final    Potassium 05/16/2025 4.2  3.5 - 5.1 mmol/L Final    Chloride 05/16/2025 105  95 - 110 mmol/L Final    CO2 05/16/2025 24  23 - 29 mmol/L Final    Glucose 05/16/2025 120 (H)  70 - 110 mg/dL Final    BUN 05/16/2025 18  8 - 23 mg/dL Final    Creatinine 05/16/2025 1.0  0.5 - 1.4 mg/dL Final    Calcium 05/16/2025 10.6 (H)  8.7 - 10.5 mg/dL Final    Protein Total 05/16/2025 6.9  6.0 - 8.4 gm/dL Final    Albumin 05/16/2025 3.7  3.5 - 5.2 g/dL Final    Bilirubin Total 05/16/2025 0.5  0.1 - 1.0 mg/dL Final    For infants and newborns, interpretation of results should be based   on gestational age, weight and in agreement with clinical   observations.    Premature Infant recommended reference ranges:   0-24 hours:  <8.0 mg/dL   24-48 hours: <12.0 mg/dL   3-5 days:    <15.0 mg/dL   6-29 days:   <15.0 mg/dL    ALP 05/16/2025 83  40 - 150 unit/L Final    AST 05/16/2025 13  11 - 45 unit/L Final    ALT 05/16/2025 14  10 - 44 unit/L Final    Anion Gap 05/16/2025 12  8 - 16 mmol/L Final    eGFR 05/16/2025 >60  >60 mL/min/1.73/m2 Final    Estimated GFR calculated using the CKD-EPI creatinine (2021) equation.    WBC 05/16/2025 8.98  3.90 - 12.70 K/uL Final    RBC 05/16/2025 4.04  4.00 - 5.40 M/uL Final    HGB 05/16/2025 11.7 (L)  12.0 - 16.0  gm/dL Final    HCT 05/16/2025 37.2  37.0 - 48.5 % Final    MCV 05/16/2025 92  82 - 98 fL Final    MCH 05/16/2025 29.0  27.0 - 31.0 pg Final    MCHC 05/16/2025 31.5 (L)  32.0 - 36.0 g/dL Final    RDW 05/16/2025 15.9 (H)  11.5 - 14.5 % Final    Platelet Count 05/16/2025 178  150 - 450 K/uL Final    MPV 05/16/2025 8.7 (L)  9.2 - 12.9 fL Final    Nucleated RBC 05/16/2025 0  <=0 /100 WBC Final    Neut % 05/16/2025 79.6 (H)  38 - 73 % Final    Lymph % 05/16/2025 14.0 (L)  18 - 48 % Final    Mono % 05/16/2025 2.9 (L)  4 - 15 % Final    Eos % 05/16/2025 3.0  <=8 % Final    Basophil % 05/16/2025 0.2  <=1.9 % Final    Imm Grans % 05/16/2025 0.3  0.0 - 0.5 % Final    Neut # 05/16/2025 7.14  1.8 - 7.7 K/uL Final    Lymph # 05/16/2025 1.26  1 - 4.8 K/uL Final    Mono # 05/16/2025 0.26 (L)  0.3 - 1 K/uL Final    Eos # 05/16/2025 0.27  <=0.5 K/uL Final    Baso # 05/16/2025 0.02  <=0.2 K/uL Final    Imm Grans # 05/16/2025 0.03  0.00 - 0.04 K/uL Final    Mild elevation in immature granulocytes is non specific and can be seen in a variety of conditions including stress response, acute inflammation, trauma and pregnancy. Correlation with other laboratory and clinical findings is essential.         Imaging:     MRI Brain 3/07/25  Gray matter distinction is preserved throughout the brain parenchyma. The ventricles are midline without mass effect. There is mild periventricular and deep white matter FLAIR and T2 hyperintensities likely reflecting changes of chronic microvascular ischemia. No intra-axial hemorrhage or restricted diffusion. No intra-axial fluid collection or mass. Bone marrow signal of the calvarium is within normal limits. Major vascular flow voids are within normal limits. The orbits globes and optic nerves are grossly unremarkable. Paranasal sinuses are unremarkable.    PET/CT 3/10/25  Quality of the study: Adequate.     There is increased FDG avidity within a posterior right upper lobe pulmonary nodule measuring 2.0 cm  on transmission CT images. This shows max SUV of 3.6 on today's exam versus 1.8 on prior exam.     No other abnormal regions of FDG uptake are identified.     Physiologic uptake of the tracer is present within the brain, salivary glands, myocardium, GI and  tracts.     Incidental CT findings: N/A       Assessment:       1. Cancer of lower lobe of right lung    2. MEN (multiple endocrine neoplasia)    3. Hypothyroidism due to acquired atrophy of thyroid    4. Pancreatic insufficiency    5. Rash         Plan:     NSCLC - Pt with stage IIIa pT1cN2 NSCLC SCC of the RLL with met to station 7 LN  -TEMPUS tissue testing showed mutations in CDKN2A, FAT1, and TP53.  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 and MRI T spine 3/13/25  -Lesion seen in the cord measuring 55 mm in length by 10 mm medial lateral and 9 mm AP from T2-T4  -MRI T-spine on 03/13/2025 showed expansile mass in the thoracic cord concerning for potential ependymoma versus astrocytoma (Report in media)  -Repeat MRI T-spine 4/11/25 showed a small syrinx with mild surrounding edema presumably related to mass effect and deformity of the cord at T3-4 from a 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 As scheduled for CBC and CMP on 5/26/25 prior to seeing Dr. William with treatment to follow. Patient will need same labs prior to seeing Dr. William on 6/9/25 with treatment to follow.   Follow up with [facility] . Patient will need CBC and CMP prior to seeing [facility] on 6/2/25 with treatment to follow on 6/2/25. Patient will need same labs prior to see [facility] on 6/16/25 with treatment to follow.   Infusion scheduling note   Proceed with treatment on 5/19/25   Injection scheduling note    Labs    Imaging    Pharmacy appointment    Other referrals              Treatment Plan Information   OP NSCLC PACLitaxel CARBOplatin QW + radiation Sea William MD   Associated diagnosis: Cancer of lower lobe of right lung  cT1c, cN2(f), cM0 noted on 3/14/2025   Line of treatment: Adjuvant  Treatment Goal: Curative     Upcoming Treatment Dates - OP NSCLC PACLitaxel CARBOplatin QW + radiation    5/19/2025       Pre-Medications       palonosetron 0.25mg/dexAMETHasone 12mg in NS IVPB 0.25 mg 50 mL       diphenhydrAMINE (BENADRYL) 50 mg in NS 50 mL IVPB       famotidine (PF) injection 20 mg       Chemotherapy       PACLitaxeL (TAXOL) in 0.9% NaCl 250 mL chemo infusion       CARBOplatin (PARAPLATIN) 200 mg in 0.9% NaCl 270 mL chemo infusion  5/26/2025       Pre-Medications       palonosetron 0.25mg/dexAMETHasone 12mg in NS IVPB 0.25 mg 50 mL       diphenhydrAMINE (BENADRYL) 50 mg in NS 50 mL IVPB       famotidine (PF) injection 20 mg       Chemotherapy       PACLitaxeL (TAXOL) in 0.9% NaCl 250 mL chemo infusion       CARBOplatin (PARAPLATIN) 200 mg in 0.9% NaCl 270 mL chemo infusion  6/2/2025       Pre-Medications       palonosetron 0.25mg/dexAMETHasone 12mg in NS IVPB 0.25 mg 50 mL       diphenhydrAMINE (BENADRYL) 50  mg in NS 50 mL IVPB       famotidine (PF) injection 20 mg       Chemotherapy       PACLitaxeL (TAXOL) in 0.9% NaCl 250 mL chemo infusion       CARBOplatin (PARAPLATIN) 200 mg in 0.9% NaCl 270 mL chemo infusion  6/9/2025       Pre-Medications       palonosetron 0.25mg/dexAMETHasone 12mg in NS IVPB 0.25 mg 50 mL       diphenhydrAMINE (BENADRYL) 50 mg in NS 50 mL IVPB       famotidine (PF) injection 20 mg       Chemotherapy       PACLitaxeL (TAXOL) in 0.9% NaCl 250 mL chemo infusion       CARBOplatin (PARAPLATIN) 200 mg in 0.9% NaCl 270 mL chemo infusion    Visit today included increased complexity associated with the care of the episodic problem (chemotherapy) addressed and managing the longitudinal care of the patient due to the serious and/or complex managed problem(s) NSCLC.    RAQUEL Claudio-C  Hematology and Medical Oncology  [facility]  [facility]    41 minutes of total time spent on the encounter, which includes face to face time and non-face to face time preparing to see the patient (eg, review of tests), Obtaining and/or reviewing separately obtained history, documenting clinical information in the electronic or other health record, independently interpreting results if documented above (not separately reported) and communicating results to the patient/family/caregiver, or Care coordination (not separately reported).            [1]   Current Outpatient Medications   Medication Sig Dispense Refill    acetaminophen (TYLENOL) 325 MG tablet Take 2 tablets (650 mg total) by mouth every 6 (six) hours as needed for Pain (Do not take with any other tylenol containing products).  0    allopurinoL (ZYLOPRIM) 300 MG tablet TAKE 1 TABLET DAILY (Patient taking differently: Take 300 mg by mouth Daily.) 90 tablet 1    amLODIPine (NORVASC) 5 MG tablet TAKE 1 TABLET(5 MG) BY MOUTH TWICE DAILY 180 tablet 1    busPIRone (BUSPAR) 5 MG Tab Take 1 tablet (5 mg total) by mouth 2 (two)  times daily. 180 tablet 3    calcium carbonate (CALCIUM 500) 500 mg calcium (1,250 mg) chewable tablet Take 1 tablet by mouth once daily.      clopidogreL (PLAVIX) 75 mg tablet Take 1 tablet (75 mg total) by mouth once daily. 90 tablet 3    colestipoL (COLESTID) 1 gram Tab TAKE 2 TABLETS ONCE DAILY (Patient taking differently: Take 2 g by mouth once daily.) 180 tablet 1    cyanocobalamin 1,000 mcg/mL injection INJECT 1 ML IN THE MUSCLE EVERY 14 DAYS (Patient taking differently: Inject 1,000 mcg into the muscle every 14 (fourteen) days.) 10 mL 0    doxycycline (VIBRAMYCIN) 100 MG Cap Take 1 capsule (100 mg total) by mouth every 12 (twelve) hours. for 7 days 14 capsule 0    EScitalopram oxalate (LEXAPRO) 20 MG tablet TAKE 1 TABLET EVERY EVENING 90 tablet 3    fluticasone propionate (FLONASE) 50 mcg/actuation nasal spray 2 sprays (100 mcg total) by Each Nostril route 2 (two) times daily. 48 g 3    furosemide (LASIX) 20 MG tablet TAKE 1 TABLET(20 MG) BY MOUTH EVERY DAY 90 tablet 3    gabapentin (NEURONTIN) 400 MG capsule Take 1 capsule (400 mg total) by mouth 2 (two) times daily. 180 capsule 3    hydrALAZINE (APRESOLINE) 25 MG tablet Take 1 tablet (25 mg total) by mouth daily as needed (Check BP after taking amlodipine, Lasix, metoprolol.  If it continues to stay greater than 160/100 take hydralazine). 30 tablet 0    levothyroxine (SYNTHROID) 75 MCG tablet TAKE 1 TABLET DAILY 90 tablet 1    LIDOcaine-prilocaine (EMLA) cream Apply topically as needed (Apply 30 minutes prior to port access). 30 g 2    magnesium oxide (MAG-OX) 400 mg (241.3 mg magnesium) tablet Take 1 tablet (400 mg total) by mouth once daily.      methocarbamoL (ROBAXIN) 750 MG Tab Take 1 tablet (750 mg total) by mouth every 8 (eight) hours as needed (muscle spasm). 270 tablet 3    methylPREDNISolone (MEDROL DOSEPACK) 4 mg tablet Take 4 mg by mouth.      metoprolol succinate (TOPROL-XL) 50 MG 24 hr tablet Take 1 tablet (50 mg total) by mouth once daily.  Pt takes 1/2 tab daily 90 tablet 2    mupirocin (BACTROBAN) 2 % ointment Apply topically once daily.      OLANZapine (ZYPREXA) 5 MG tablet Take 1 tablet (5 mg total) by mouth every evening. On days 1-3 of each chemotherapy cycle. 12 tablet 1    ondansetron (ZOFRAN) 8 MG tablet Take 1 tablet (8 mg total) by mouth every 8 (eight) hours as needed for Nausea. 30 tablet 2    oxyCODONE-acetaminophen (PERCOCET) 5-325 mg per tablet Take 1 tablet by mouth every 8 (eight) hours as needed for Pain.      pantoprazole (PROTONIX) 40 MG tablet TAKE 1 TABLET DAILY (Patient taking differently: Take 40 mg by mouth once daily.) 90 tablet 3    potassium chloride SA (K-DUR,KLOR-CON M) 10 MEQ tablet Take 1 tablet (10 mEq total) by mouth once daily. (Patient taking differently: Take 10 mEq by mouth nightly.) 90 tablet 3    rosuvastatin (CRESTOR) 10 MG tablet TAKE 1 TABLET DAILY (Patient taking differently: Take 10 mg by mouth once daily.) 90 tablet 3    traMADoL (ULTRAM) 50 mg tablet Take 1 tablet (50 mg total) by mouth every 6 (six) hours as needed for Pain. 10 tablet 0    traZODone (DESYREL) 100 MG tablet TAKE 1 TABLET EVERY EVENING (Patient taking differently: Take 100 mg by mouth every evening.) 90 tablet 3    triamcinolone acetonide 0.1% (KENALOG) 0.1 % ointment Apply topically 2 (two) times daily.      vit A/vit C/vit E/zinc/copper (PRESERVISION AREDS ORAL) Take 1 capsule by mouth 2 (two) times a day.      vitamin D (VITAMIN D3) 1000 units Tab Take 1,000 Units by mouth 2 (two) times a day.      ZENPEP 40,000-126,000- 168,000 unit CpDR Take 1 capsule by mouth once daily.       No current facility-administered medications for this visit.     Facility-Administered Medications Ordered in Other Visits   Medication Dose Route Frequency Provider Last Rate Last Admin    oxyCODONE immediate release tablet 5 mg  5 mg Oral Once PRPaul Montejo MD

## 2025-05-19 ENCOUNTER — INFUSION (OUTPATIENT)
Dept: INFUSION THERAPY | Facility: HOSPITAL | Age: 70
End: 2025-05-19
Attending: INTERNAL MEDICINE
Payer: MEDICARE

## 2025-05-19 ENCOUNTER — DOCUMENTATION ONLY (OUTPATIENT)
Dept: INFUSION THERAPY | Facility: HOSPITAL | Age: 70
End: 2025-05-19
Payer: MEDICARE

## 2025-05-19 VITALS
BODY MASS INDEX: 30.65 KG/M2 | HEART RATE: 73 BPM | HEIGHT: 67 IN | RESPIRATION RATE: 16 BRPM | SYSTOLIC BLOOD PRESSURE: 110 MMHG | WEIGHT: 195.31 LBS | TEMPERATURE: 98 F | DIASTOLIC BLOOD PRESSURE: 63 MMHG | OXYGEN SATURATION: 94 %

## 2025-05-19 DIAGNOSIS — C34.31 CANCER OF LOWER LOBE OF RIGHT LUNG: Primary | ICD-10-CM

## 2025-05-19 PROCEDURE — 25000003 PHARM REV CODE 250: Mod: PN | Performed by: NURSE PRACTITIONER

## 2025-05-19 PROCEDURE — 96417 CHEMO IV INFUS EACH ADDL SEQ: CPT | Mod: PN

## 2025-05-19 PROCEDURE — 63600175 PHARM REV CODE 636 W HCPCS: Mod: PN | Performed by: NURSE PRACTITIONER

## 2025-05-19 PROCEDURE — 96367 TX/PROPH/DG ADDL SEQ IV INF: CPT | Mod: PN

## 2025-05-19 PROCEDURE — 96375 TX/PRO/DX INJ NEW DRUG ADDON: CPT | Mod: PN

## 2025-05-19 PROCEDURE — 96413 CHEMO IV INFUSION 1 HR: CPT | Mod: PN

## 2025-05-19 RX ORDER — FAMOTIDINE 10 MG/ML
20 INJECTION, SOLUTION INTRAVENOUS
Status: COMPLETED | OUTPATIENT
Start: 2025-05-19 | End: 2025-05-19

## 2025-05-19 RX ORDER — EPINEPHRINE 0.3 MG/.3ML
0.3 INJECTION SUBCUTANEOUS ONCE AS NEEDED
Status: DISCONTINUED | OUTPATIENT
Start: 2025-05-19 | End: 2025-05-19 | Stop reason: HOSPADM

## 2025-05-19 RX ORDER — DIPHENHYDRAMINE HYDROCHLORIDE 50 MG/ML
50 INJECTION, SOLUTION INTRAMUSCULAR; INTRAVENOUS ONCE AS NEEDED
Status: DISCONTINUED | OUTPATIENT
Start: 2025-05-19 | End: 2025-05-19 | Stop reason: HOSPADM

## 2025-05-19 RX ORDER — SODIUM CHLORIDE 0.9 % (FLUSH) 0.9 %
10 SYRINGE (ML) INJECTION
Status: DISCONTINUED | OUTPATIENT
Start: 2025-05-19 | End: 2025-05-19 | Stop reason: HOSPADM

## 2025-05-19 RX ADMIN — DIPHENHYDRAMINE HYDROCHLORIDE 50 MG: 50 INJECTION INTRAMUSCULAR; INTRAVENOUS at 10:05

## 2025-05-19 RX ADMIN — PACLITAXEL 90 MG: 6 INJECTION, SOLUTION INTRAVENOUS at 11:05

## 2025-05-19 RX ADMIN — FAMOTIDINE 20 MG: 10 INJECTION INTRAVENOUS at 11:05

## 2025-05-19 RX ADMIN — SODIUM CHLORIDE: 9 INJECTION, SOLUTION INTRAVENOUS at 10:05

## 2025-05-19 RX ADMIN — CARBOPLATIN 200 MG: 10 INJECTION, SOLUTION INTRAVENOUS at 12:05

## 2025-05-19 RX ADMIN — DEXAMETHASONE SODIUM PHOSPHATE 0.25 MG: 10 INJECTION, SOLUTION INTRAMUSCULAR; INTRAVENOUS at 10:05

## 2025-05-19 NOTE — PLAN OF CARE
Problem: Adult Inpatient Plan of Care  Goal: Plan of Care Review  Outcome: Progressing  Flowsheets (Taken 5/19/2025 1319)  Plan of Care Reviewed With:   patient   friend  Goal: Patient-Specific Goal (Individualized)  Outcome: Progressing  Flowsheets (Taken 5/19/2025 1319)  Individualized Care Needs: education, conversation, sleep, blanket, pillow, home food, friend at chairside, recliner, stretcher, dim lights, dietician visit  Anxieties, Fears or Concerns: drowsy from benadryl  Goal: Absence of Hospital-Acquired Illness or Injury  Outcome: Progressing  Goal: Optimal Comfort and Wellbeing  Outcome: Progressing  Intervention: Provide Person-Centered Care  Flowsheets (Taken 5/19/2025 1319)  Trust Relationship/Rapport:   care explained   empathic listening provided   reassurance provided   choices provided   questions answered   thoughts/feelings acknowledged   emotional support provided   questions encouraged  Goal: Readiness for Transition of Care  Outcome: Progressing     Problem: Fatigue  Goal: Improved Activity Tolerance  Outcome: Progressing  Intervention: Promote Improved Energy  Flowsheets (Taken 5/19/2025 1319)  Fatigue Management:   fatigue-related activity identified   paced activity encouraged   activity assistance provided   frequent rest breaks encouraged  Sleep/Rest Enhancement:   therapeutic touch utilized   room darkened   awakenings minimized   natural light exposure provided   family presence promoted   noise level reduced   relaxation techniques promoted  Activity Management:   Ambulated -L4   Up in chair - L3  Environmental Support:   rest periods encouraged   distractions minimized   calm environment promoted   personal routine supported     Problem: Fall Injury Risk  Goal: Absence of Fall and Fall-Related Injury  Outcome: Progressing  Intervention: Promote Injury-Free Environment  Flowsheets (Taken 5/19/2025 1319)  Safety Promotion/Fall Prevention:   in recliner, wheels locked   instructed to  call staff for mobility   lighting adjusted   high risk medications identified   family to remain at bedside   family expresses understanding of fall risk and prevention   Fall Risk reviewed with patient/family   patient expresses understanding of fall risk and prevention   room near unit station   supervised activity     Problem: Chemotherapy Effects  Goal: Safety Maintained  Outcome: Progressing  Intervention: Promote Safe Chemotherapy Delivery  Flowsheets (Taken 5/19/2025 0609)  Infection Prevention:   hand hygiene promoted   visitors restricted/screened   environmental surveillance performed   personal protective equipment utilized   rest/sleep promoted   equipment surfaces disinfected  Chemotherapy Environmental Safety:   visitors screened   visitors limited   protective environment maintained   patient environment monitored for safety   personal protective equipment utilized   food monitored for safe practices   meticulous hand hygiene promoted   chemotherapy waste containers in room

## 2025-05-19 NOTE — PROGRESS NOTES
Oncology Nutrition   Chemotherapy Infusion Visit    Nutrition Follow Up   RD met with pt today during infusion of Carbo/Taxol C2. She is drinking water throughout the day. She is not sure the exact amount but does fill up large bottle and drinks all day. Good appetite. She brought food (chicken tenders and grapes) with her today to prepare for long day. She has long history of diarrhea and said it has started up more since beginning chemo. She lost coverage for pancreatic enzymes and can't afford them. RD explained that some diarrhea could also be from poor gut health too. She agrees. Therefore RD provided samples of Banatrol Plus powder to add to water/juice. Explained to her to begin with one per day for about 3-4 days then increase to 2 packs/day. RD will f/u on tolerance and improvements at next infusion.     Wt Readings from Last 10 Encounters:   05/19/25 88.6 kg (195 lb 5.2 oz)   05/16/25 88.2 kg (194 lb 7.1 oz)   05/12/25 88.6 kg (195 lb 5.2 oz)   05/08/25 91.1 kg (200 lb 13.4 oz)   05/07/25 88.5 kg (195 lb)   04/28/25 88.8 kg (195 lb 12.3 oz)   04/23/25 91 kg (200 lb 9.9 oz)   04/21/25 91.4 kg (201 lb 8 oz)   04/21/25 89.7 kg (197 lb 12 oz)   04/15/25 89.7 kg (197 lb 12 oz)     All other nutrition questions/concerns addressed as appropriate. Will continue to monitor prn throughout treatment.     Shavonne Keller, JOSEN, LDN, [facility]  05/19/2025  10:49 AM

## 2025-05-19 NOTE — PLAN OF CARE
Pt arrived to clinic today for C2D1 of Taxol and Carboplatin infusion and tolerated well with no changes throughout therapy. Pt aware of side effects and number to call for any needs and discharged to home in NAD with caregiver.

## 2025-05-20 ENCOUNTER — DOCUMENTATION ONLY (OUTPATIENT)
Dept: REHABILITATION | Facility: HOSPITAL | Age: 70
End: 2025-05-20
Payer: MEDICARE

## 2025-05-20 ENCOUNTER — PATIENT MESSAGE (OUTPATIENT)
Dept: REHABILITATION | Facility: HOSPITAL | Age: 70
End: 2025-05-20
Payer: MEDICARE

## 2025-05-20 DIAGNOSIS — C34.31 CANCER OF LOWER LOBE OF RIGHT LUNG: Primary | ICD-10-CM

## 2025-05-20 NOTE — PROGRESS NOTES
Pt stopped by my desk asking about an apt for Acup; Scheduled pt to see IO NP Sherry WEEKS on 5/22/25 at 9:00 am to est care and get referral for acup. Pt verbalized her acceptance of the apt as above. Location and check-in process was discussed.

## 2025-05-22 ENCOUNTER — PATIENT MESSAGE (OUTPATIENT)
Dept: HEMATOLOGY/ONCOLOGY | Facility: CLINIC | Age: 70
End: 2025-05-22

## 2025-05-22 ENCOUNTER — DOCUMENTATION ONLY (OUTPATIENT)
Dept: RADIATION ONCOLOGY | Facility: CLINIC | Age: 70
End: 2025-05-22
Payer: MEDICARE

## 2025-05-22 ENCOUNTER — OFFICE VISIT (OUTPATIENT)
Dept: HEMATOLOGY/ONCOLOGY | Facility: CLINIC | Age: 70
End: 2025-05-22
Payer: MEDICARE

## 2025-05-22 VITALS
BODY MASS INDEX: 30.56 KG/M2 | TEMPERATURE: 97 F | OXYGEN SATURATION: 97 % | HEIGHT: 67 IN | WEIGHT: 194.69 LBS | SYSTOLIC BLOOD PRESSURE: 127 MMHG | HEART RATE: 65 BPM | DIASTOLIC BLOOD PRESSURE: 60 MMHG

## 2025-05-22 DIAGNOSIS — G62.9 PERIPHERAL POLYNEUROPATHY: ICD-10-CM

## 2025-05-22 DIAGNOSIS — F41.1 GENERALIZED ANXIETY DISORDER: ICD-10-CM

## 2025-05-22 DIAGNOSIS — M54.59 OTHER LOW BACK PAIN: Primary | ICD-10-CM

## 2025-05-22 DIAGNOSIS — R53.83 CHEMOTHERAPY-INDUCED FATIGUE: ICD-10-CM

## 2025-05-22 DIAGNOSIS — T45.1X5A CHEMOTHERAPY-INDUCED FATIGUE: ICD-10-CM

## 2025-05-22 DIAGNOSIS — K12.31 MUCOSITIS DUE TO CHEMOTHERAPY: ICD-10-CM

## 2025-05-22 DIAGNOSIS — K12.31 MUCOSITIS DUE TO CHEMOTHERAPY: Primary | ICD-10-CM

## 2025-05-22 DIAGNOSIS — C34.31 CANCER OF LOWER LOBE OF RIGHT LUNG: ICD-10-CM

## 2025-05-22 PROCEDURE — 99215 OFFICE O/P EST HI 40 MIN: CPT | Mod: PBBFAC,PN | Performed by: NURSE PRACTITIONER

## 2025-05-22 PROCEDURE — 99999 PR PBB SHADOW E&M-EST. PATIENT-LVL V: CPT | Mod: PBBFAC,,, | Performed by: NURSE PRACTITIONER

## 2025-05-22 PROCEDURE — 99215 OFFICE O/P EST HI 40 MIN: CPT | Mod: S$PBB,,, | Performed by: NURSE PRACTITIONER

## 2025-05-22 RX ORDER — OXYCODONE AND ACETAMINOPHEN 10; 325 MG/1; MG/1
1 TABLET ORAL EVERY 8 HOURS PRN
COMMUNITY
Start: 2025-05-01

## 2025-05-22 NOTE — PLAN OF CARE
Completed 9 of 30 outpatient radiation treatments without difficulty.  All questions and concerns addressed by MD this visit.

## 2025-05-22 NOTE — PROGRESS NOTES
Alessia Nelson  69 y.o. is here to seek an integrative approach to discuss side effects related to lung cancer treatment. Alessia Nelson  was referred by Dr. William     HPI  Mrs. Chapman states she smoked for 50 years and quit 2 years ago. She was getting low dose screening CT scans and had a nodule that was being monitored. She had an EBUS 2/2025 and was diagnosed with lung cancer. She will start cycle 3 of treatment Monday. She reports fatigue with treatment. She states she has always had trouble sleeping, but since starting treatment she has been sleeping well. She will also take a nap. She reports high anxiety. She is established with Dr. Soliz. She takes Lexapro for depression and states it is effective. She has mouth sores which makes eating difficult. 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 The steroids caused constipation, but she is using the bathroom now. Her current activity level is low, but she usually gets in her pool and walks. She has also gone to PT previously to help with her low back pain. She has neuropathy to her hands and feet with mostly numbness.   She is single and does not have a good support system. She does have some good neighbors. Her career was criminal justice.     Pillars Assessment    Sleep  How many hours of sleep per night? 8 hours  Do you have trouble falling asleep, staying asleep or waking up earlier than you need to? no  Do you have daytime fatigue? yes  Do you need medication for sleep? no  Do you use any supplements or other interventions for sleep? no    Resilience  Rate your current level of stress- high    Nutrition   Food allergies or sensitivities: no  Do you adhere to a particular type of diet? no  Do you have any concerns with your eating habits? yes    Exercise  How would you describe your physical activity level? Currently low    Past Medical History  Past Medical History:   Diagnosis Date    Acute pancreatitis      Anticoagulant long-term use     Back pain     CAD (coronary artery disease)     CHF (congestive heart failure)     COPD (chronic obstructive pulmonary disease)     Depression     Diverticulosis     Encounter for blood transfusion     GERD (gastroesophageal reflux disease)     History of blood clots     1986 AFTER BOWEL RESCETION    History of bowel resection     Hyperlipidemia     Hypertension     Malignant neoplasm of unspecified part of unspecified bronchus or lung     Peritonitis     PONV (postoperative nausea and vomiting)     Pulmonary nodule     Seizures     HAD A SEIZURE FROM PHENERGAN     Stroke     left sided weakness    Thyroid disease     TIA (transient ischemic attack)     Wears dentures     upper      Past Surgical History   Past Surgical History:   Procedure Laterality Date    ADRENAL GLAND SURGERY      APPENDECTOMY      BACK SURGERY      CAUDAL EPIDURAL STEROID INJECTION N/A 03/01/2021    Procedure: Injection-steroid-epidural-caudal;  Surgeon: Socorro Lorenz MD;  Location: [facility] OR;  Service: Pain Management;  Laterality: N/A;    CHOLECYSTECTOMY      COLONOSCOPY      COLONOSCOPY N/A 06/10/2021    Procedure: COLONOSCOPY;  Surgeon: Sheree Metz MD;  Location: [facility] ENDO;  Service: Endoscopy;  Laterality: N/A;    COLONOSCOPY N/A 03/08/2022    Procedure: COLONOSCOPY;  Surgeon: Maurilio Rodriguez MD;  Location: [facility] ENDO ([address] [place] [address]);  Service: Endoscopy;  Laterality: N/A;  Pt to perform at-home COVID test morning of procedure-DS  2/24-Blood thinner approval rec'rom Dr. Walsh (see letters tab 2/24/22)-DS  3/2-Instructions sent via email-DS  3/7-Pt unable to come earlier due to ride-DS    CORONARY STENT PLACEMENT      CYSTOURETHROSCOPY N/A 11/13/2019    Procedure: CYSTOURETHROSCOPY;  Surgeon: Shun Nuñez MD;  Location: [facility] OR;  Service: Urology;  Laterality: N/A;    DIRECT DIAGNOSTIC LARYNGOSCOPY WITH BRONCHOSCOPY AND ESOPHAGOSCOPY N/A 01/03/2024    Procedure: LARYNGOSCOPY, DIRECT, DIAGNOSTIC,  WITH BRONCHOSCOPY AND ESOPHAGOSCOPY;  Surgeon: Mir Belle MD;  Location: [facility] OR;  Service: ENT;  Laterality: N/A;    ENDOBRONCHIAL ULTRASOUND Bilateral 2/20/2025    Procedure: ENDOBRONCHIAL ULTRASOUND (EBUS);  Surgeon: Ismael Cooper MD;  Location: [facility] OR;  Service: Pulmonary;  Laterality: Bilateral;    ENDOSCOPIC ULTRASOUND OF LOWER GASTROINTESTINAL TRACT N/A 03/08/2022    Procedure: ULTRASOUND, LOWER GI TRACT, ENDOSCOPIC;  Surgeon: Maurilio Rodriguez MD;  Location: [facility] (2ND FLR);  Service: Endoscopy;  Laterality: N/A;  Pt to perform at-home COVID test morning of procedure-DS  2/24-Blood thinner approval rec'[facility] Dr. Walsh (see letters tab 2/24/22)-DS  3/2-Instructions sent via email-DS  3/7-Pt unable to come earlier due to ride-DS    ENDOSCOPIC ULTRASOUND OF UPPER GASTROINTESTINAL TRACT N/A 11/25/2019    Procedure: ULTRASOUND, UPPER GI TRACT, ENDOSCOPIC;  Surgeon: Alhaji Bridges MD;  Location: [facility] (2ND FLR);  Service: Endoscopy;  Laterality: N/A;  5 day hold Plavix, Dr Jovanni Serrano - pg  PM prep    ENDOSCOPIC ULTRASOUND OF UPPER GASTROINTESTINAL TRACT N/A 11/02/2020    Procedure: ULTRASOUND, UPPER GI TRACT, ENDOSCOPIC;  Surgeon: Maurilio Rodriguez MD;  Location: [facility] (2ND FLR);  Service: Endoscopy;  Laterality: N/A;  5 day hold Plavix, Dr Roman Walsh - pg  Covid-19 test 10/30/20 at [facility]    EPIDURAL STEROID INJECTION INTO CERVICAL SPINE N/A 12/08/2021    Procedure: Injection-steroid-epidural-cervical;  Surgeon: Socorro Lorenz MD;  Location: [facility] OR;  Service: Pain Management;  Laterality: N/A;    EPIDURAL STEROID INJECTION INTO CERVICAL SPINE N/A 11/30/2023    Procedure: Injection-steroid-epidural-cervical;  Surgeon: Maurilio Carroll MD;  Location: [facility] OR;  Service: Anesthesiology;  Laterality: N/A;  c7-T1    ESOPHAGOGASTRODUODENOSCOPY N/A 06/10/2021    Procedure: EGD (ESOPHAGOGASTRODUODENOSCOPY);  Surgeon: Sheree Metz MD;  Location: [facility];  Service: Endoscopy;   Laterality: N/A;    ESOPHAGOGASTRODUODENOSCOPY N/A 09/26/2024    Procedure: EGD (ESOPHAGOGASTRODUODENOSCOPY);  Surgeon: Jovanny Saldivar MD;  Location: [facility] ENDO;  Service: Endoscopy;  Laterality: N/A;    ESOPHAGOSCOPY, USING BOUGIE, WITH DILATION N/A 01/03/2024    Procedure: ESOPHAGOSCOPY, USING BOUGIE, WITH DILATION;  Surgeon: Mir Belle MD;  Location: [facility] OR;  Service: ENT;  Laterality: N/A;    FRACTURE SURGERY Left 05/02/2022    ankle    HERNIA REPAIR      HYSTERECTOMY      INCISIONAL HERNIA REPAIR      INJECTION OF ANESTHETIC AGENT AROUND NERVE Right 05/27/2019    Procedure: RIGHT L5-S3 MEDIAL BRANCH BLOCKS;  Surgeon: Sneha Aragon MD;  Location: [facility] OR;  Service: Pain Management;  Laterality: Right;  **DO NOT STOP PLAVIX**    INJECTION OF JOINT Right 09/22/2021    Procedure: Injection, Joint; Right SI Joint Injection;  Surgeon: Socorro Lorenz MD;  Location: [facility] OR;  Service: Pain Management;  Laterality: Right;  Oral    INSERTION OF DORSAL COLUMN NERVE STIMULATOR FOR TRIAL N/A 06/07/2021    Procedure: INSERTION, NEUROSTIMULATOR, SPINAL CORD, DORSAL COLUMN, FOR TRIAL;  Surgeon: Socorro Lorenz MD;  Location: [facility] OR;  Service: Pain Management;  Laterality: N/A;  nevro rep confirmed start time    INSERTION OF TUNNELED CENTRAL VENOUS CATHETER (CVC) WITH SUBCUTANEOUS PORT N/A 5/7/2025    Procedure: [id number];  Surgeon: Janet Jarrett MD;  Location: [facility] OR;  Service: General;  Laterality: N/A;    instestine      bowel section    KNEE ARTHROPLASTY Right 12/18/2019    Procedure: ARTHROPLASTY, KNEE;  Surgeon: Ike Briceño II, MD;  Location: [facility] OR;  Service: Orthopedics;  Laterality: Right;    KNEE SURGERY  1983    OOPHORECTOMY      PARATHYROID GLAND SURGERY      3 surgeries    RADIOFREQUENCY ABLATION Right 06/10/2019    Procedure: Radiofrequency Ablation - RIGHT L3-5 RADIOFREQUENCY ABLATION WITH HALYARD COOLIEF THERMAL SYSTEM;  Surgeon: Sneha Aragon MD;  Location: [facility]  "OR;  Service: Pain Management;  Laterality: Right;  **HOLD PLAVIX x 7 DAYS PRIOR**    REVISION OF KNEE ARTHROPLASTY Right     AVALA with Dr Sinhg    ROBOTIC BRONCHOSCOPY Bilateral 2/20/2025    Procedure: ROBOTIC BRONCHOSCOPY;  Surgeon: Ismael Cooper MD;  Location: [facility] OR;  Service: Pulmonary;  Laterality: Bilateral;    SPINAL CORD STIMULATOR REMOVAL N/A 02/21/2024    Procedure: REMOVAL, NEUROSTIMULATOR, SPINAL;  Surgeon: Maurilio Carroll MD;  Location: [facility] OR;  Service: Pain Management;  Laterality: N/A;  removal of spinal cord stimulator    UPPER GASTROINTESTINAL ENDOSCOPY        Family History   Family History   Problem Relation Name Age of Onset    Stroke Maternal Grandmother      Cancer Maternal Grandfather      Stroke Mother      Heart disease Father      Breast cancer Sister        Allergies  Review of patient's allergies indicates:   Allergen Reactions    Aspirin Other (See Comments)     "Makes stomach feel like it's on fire"    Phenergan [promethazine] Other (See Comments)     SEIZURES    Nickel     Lortab [hydrocodone-acetaminophen] Itching     Able to take generic Norco      Current Medications:  Current Medications[1]     Review of Systems  Review of Systems   Constitutional:  Positive for malaise/fatigue.   HENT: Negative.     Eyes: Negative.    Respiratory: Negative.     Cardiovascular: Negative.    Gastrointestinal: Negative.    Genitourinary: Negative.    Musculoskeletal:  Positive for back pain and joint pain.   Skin: Negative.    Neurological:  Positive for tingling.   Endo/Heme/Allergies: Negative.    Psychiatric/Behavioral:  The patient is nervous/anxious.       Physical Exam      Vitals:    05/22/25 0858   BP: 127/60   BP Location: Right arm   Patient Position: Sitting   Pulse: 65   Temp: 97.1 °F (36.2 °C)   TempSrc: Temporal   SpO2: 97%   Weight: 88.3 kg (194 lb 11.2 oz)   Height: 5' 7" (1.702 m)     Body mass index is 30.49 kg/m².  Physical Exam  Vitals reviewed.   Constitutional:       " Appearance: Normal appearance.   Neurological:      Mental Status: She is alert.   Psychiatric:         Mood and Affect: Mood normal.         Behavior: Behavior normal.     ASSESSMENT :  1. Other low back pain    2. Peripheral polyneuropathy    3. Generalized anxiety disorder    4. Mucositis due to chemotherapy    5. Chemotherapy-induced fatigue    6. Cancer of lower lobe of right lung       PLAN:  Reviewed all information discussed at today's visit and all questions were answered.    Counseled on healthy lifestyle and behavior modifications   Referral to PT I discussed the importance of therapy and explained the physical therapists will work with you to develop a specialized treatment program to help reduce and improve cancer-related problems and improve your strength and function.   Continue Oncology Psychology  Recommended Healios for mouth sores  Recommended Dermavitality Neuropathy Support Cream, samples given  Referral to Acupuncture  I explained acupuncture can reduce fatigue,  pain, and neuropathy. It can also assist with behavioral health such as depression and anxiety and improve overall sleep quality. Acupuncture helps improve overall symptoms from treatment.   See Nutrition during treatment as needed   I discussed and recommended the following support services:  Cain Chi and Yoga I suggested [employer] Chi and/or Yoga as these practices reduce stress, increases flexibility and muscle strength, improves balance and promotes serenity in the power of movement to help fight disease and boost your immune system.   Music and relaxation therapy and Meditation which can decrease stress by lowering blood pressure, slowing breathing, and helping you be more present in the moment. It improves sleep by relaxing the body and mind at the end of the day.Meditation also trains you how to focus on one thing at a time, improving concentration. It also promotes emotional well-being by decreasing depression and anxiety, and helping  create a more positive outlook on life.  Art Therapy    Message send to Katlyn Sheikh NP regarding mucositis and possibly sending in a script.    Follow up with Integrative Services in 3 months    I spent a total of 67 minutes on the day of the visit.This includes face to face time and non-face to face time preparing to see the patient (eg, review of tests), obtaining and/or reviewing separately obtained history, documenting clinical information in the electronic or other health record, independently interpreting results and communicating results to the patient/family/caregiver, or care coordinator.       [1]   Current Outpatient Medications:     acetaminophen (TYLENOL) 325 MG tablet, Take 2 tablets (650 mg total) by mouth every 6 (six) hours as needed for Pain (Do not take with any other tylenol containing products)., Disp: , Rfl: 0    allopurinoL (ZYLOPRIM) 300 MG tablet, TAKE 1 TABLET DAILY, Disp: 90 tablet, Rfl: 1    amLODIPine (NORVASC) 5 MG tablet, TAKE 1 TABLET(5 MG) BY MOUTH TWICE DAILY, Disp: 180 tablet, Rfl: 1    busPIRone (BUSPAR) 5 MG Tab, Take 1 tablet (5 mg total) by mouth 2 (two) times daily., Disp: 180 tablet, Rfl: 3    calcium carbonate (CALCIUM 500) 500 mg calcium (1,250 mg) chewable tablet, Take 1 tablet by mouth once daily., Disp: , Rfl:     clopidogreL (PLAVIX) 75 mg tablet, Take 1 tablet (75 mg total) by mouth once daily., Disp: 90 tablet, Rfl: 3    colestipoL (COLESTID) 1 gram Tab, TAKE 2 TABLETS ONCE DAILY, Disp: 180 tablet, Rfl: 1    cyanocobalamin 1,000 mcg/mL injection, INJECT 1 ML IN THE MUSCLE EVERY 14 DAYS, Disp: 10 mL, Rfl: 0    EScitalopram oxalate (LEXAPRO) 20 MG tablet, TAKE 1 TABLET EVERY EVENING, Disp: 90 tablet, Rfl: 3    fluticasone propionate (FLONASE) 50 mcg/actuation nasal spray, 2 sprays (100 mcg total) by Each Nostril route 2 (two) times daily., Disp: 48 g, Rfl: 3    furosemide (LASIX) 20 MG tablet, TAKE 1 TABLET(20 MG) BY MOUTH EVERY DAY, Disp: 90 tablet, Rfl: 3     gabapentin (NEURONTIN) 400 MG capsule, Take 1 capsule (400 mg total) by mouth 2 (two) times daily., Disp: 180 capsule, Rfl: 3    hydrALAZINE (APRESOLINE) 25 MG tablet, Take 1 tablet (25 mg total) by mouth daily as needed (Check BP after taking amlodipine, Lasix, metoprolol.  If it continues to stay greater than 160/100 take hydralazine)., Disp: 30 tablet, Rfl: 0    levothyroxine (SYNTHROID) 75 MCG tablet, TAKE 1 TABLET DAILY, Disp: 90 tablet, Rfl: 1    LIDOcaine-prilocaine (EMLA) cream, Apply topically as needed (Apply 30 minutes prior to port access)., Disp: 30 g, Rfl: 2    magnesium oxide (MAG-OX) 400 mg (241.3 mg magnesium) tablet, Take 1 tablet (400 mg total) by mouth once daily., Disp: , Rfl:     methocarbamoL (ROBAXIN) 750 MG Tab, Take 1 tablet (750 mg total) by mouth every 8 (eight) hours as needed (muscle spasm)., Disp: 270 tablet, Rfl: 3    metoprolol succinate (TOPROL-XL) 50 MG 24 hr tablet, Take 1 tablet (50 mg total) by mouth once daily. Pt takes 1/2 tab daily, Disp: 90 tablet, Rfl: 2    mupirocin (BACTROBAN) 2 % ointment, Apply topically once daily., Disp: , Rfl:     ondansetron (ZOFRAN) 8 MG tablet, Take 1 tablet (8 mg total) by mouth every 8 (eight) hours as needed for Nausea., Disp: 30 tablet, Rfl: 2    oxyCODONE-acetaminophen (PERCOCET)  mg per tablet, Take 1 tablet by mouth every 8 (eight) hours as needed., Disp: , Rfl:     oxyCODONE-acetaminophen (PERCOCET) 5-325 mg per tablet, Take 1 tablet by mouth every 8 (eight) hours as needed for Pain., Disp: , Rfl:     pantoprazole (PROTONIX) 40 MG tablet, TAKE 1 TABLET DAILY, Disp: 90 tablet, Rfl: 3    potassium chloride SA (K-DUR,KLOR-CON M) 10 MEQ tablet, Take 1 tablet (10 mEq total) by mouth once daily., Disp: 90 tablet, Rfl: 3    rosuvastatin (CRESTOR) 10 MG tablet, TAKE 1 TABLET DAILY, Disp: 90 tablet, Rfl: 3    traZODone (DESYREL) 100 MG tablet, TAKE 1 TABLET EVERY EVENING, Disp: 90 tablet, Rfl: 3    triamcinolone acetonide 0.1% (KENALOG) 0.1 %  ointment, Apply topically 2 (two) times daily., Disp: , Rfl:     vit A/vit C/vit E/zinc/copper (PRESERVISION AREDS ORAL), Take 1 capsule by mouth 2 (two) times a day., Disp: , Rfl:     vitamin D (VITAMIN D3) 1000 units Tab, Take 1,000 Units by mouth 2 (two) times a day., Disp: , Rfl:     methylPREDNISolone (MEDROL DOSEPACK) 4 mg tablet, Take 4 mg by mouth., Disp: , Rfl:     OLANZapine (ZYPREXA) 5 MG tablet, Take 1 tablet (5 mg total) by mouth every evening. On days 1-3 of each chemotherapy cycle. (Patient not taking: Reported on 5/19/2025), Disp: 12 tablet, Rfl: 1    traMADoL (ULTRAM) 50 mg tablet, Take 1 tablet (50 mg total) by mouth every 6 (six) hours as needed for Pain. (Patient not taking: Reported on 5/22/2025), Disp: 10 tablet, Rfl: 0    ZENPEP 40,000-126,000- 168,000 unit CpDR, Take 1 capsule by mouth once daily. (Patient not taking: Reported on 5/22/2025), Disp: , Rfl:   No current facility-administered medications for this visit.    Facility-Administered Medications Ordered in Other Visits:     oxyCODONE immediate release tablet 5 mg, 5 mg, Oral, Once PRN, Paul Nunez MD

## 2025-05-25 NOTE — PROGRESS NOTES
PATIENT: Alessia Nelson  MRN: [id number]  DATE: 5/26/2025      Diagnosis:   1. Cancer of lower lobe of right lung    2. Immunodeficiency due to chemotherapy    3. Arachnoid membrane inflammation    4. Hypertension, unspecified type    5. MEN1 (multiple endocrine neoplasia)    6. Hypothyroidism, unspecified type    7. Pancreatic insufficiency    8. Mouth sores                Chief Complaint: Lung Cancer      Oncologic History:      Oncologic History     Oncologic Treatment     Pathology           Subjective:    Interval History:  Ms. Nelson is a 69 y.o. female with CAD, CHF, COPD, depression, GERD, HTN, HLD, Seizures, h/o CVA, thyroid disease who presents for NSCLC.  Since the last clinic visit the patient endorses fatigue.  The patient endorses one episode of diarrhea.  The patient denies CP, cough, SOB, abdominal pain, nausea, vomiting, constipation.  The patient denies fever, chills, night sweats, weight loss, new lumps or bumps, easy bruising or bleeding.  PT endorses sores in mouth.    Prior History:  The patient was being followed for a nodule in the right lower lobe and had PET-CT on 05/22/2024 which showed no avidity.  CT chest on 11/11/2024 showed enlargement of the ground-glass right lower lobe lung nodule measuring 18 x 19 mm and small bilateral soft tissue pulmonary nodules measuring 3-8 mm.  The patient underwent robotic bronchoscopy and EBUS on 02/20/2025 with pathology showing squamous cell carcinoma and biopsy of right lower lung nodule and a positive station 7 lymph node.  Stations 4R, 11R S and 11 RI were negative.  TEMPUS tissue testing showed mutations in CDKN2A, FAT1, and TP53.  PD-L1 was <1%.  The patient underwent CT myelogram and MRI of the thoracic spine 03/13/2025 for chronic upper back pain with radiation down bilateral arms.   CT thoracic spine on 03/13/2025 report obtained showing stable expansile mass of the superior thoracic cord extending from T2 through T4 measuring 55 mm in length  by 10 mm medial lateral and 9 mm AP.  MRI T-spine on 03/13/2025 showed expansile mass in the thoracic cord concerning for potential ependymoma versus astrocytoma.   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  Dr Staples 4/15/25 felt the patient needed fenestration with neurosurgery.     Past Medical History:   Past Medical History:   Diagnosis Date    Acute pancreatitis     Anticoagulant long-term use     Back pain     CAD (coronary artery disease)     CHF (congestive heart failure)     COPD (chronic obstructive pulmonary disease)     Depression     Diverticulosis     Encounter for blood transfusion     GERD (gastroesophageal reflux disease)     History of blood clots     1986 AFTER BOWEL RESCETION    History of bowel resection     Hyperlipidemia     Hypertension     Malignant neoplasm of unspecified part of unspecified bronchus or lung     Peritonitis     PONV (postoperative nausea and vomiting)     Pulmonary nodule     Seizures     HAD A SEIZURE FROM PHENERGAN     Stroke     left sided weakness    Thyroid disease     TIA (transient ischemic attack)     Wears dentures     upper       Past Surgical HIstory:   Past Surgical History:   Procedure Laterality Date    ADRENAL GLAND SURGERY      APPENDECTOMY      BACK SURGERY      CAUDAL EPIDURAL STEROID INJECTION N/A 03/01/2021    Procedure: Injection-steroid-epidural-caudal;  Surgeon: Socorro Lorenz MD;  Location: [facility] OR;  Service: Pain Management;  Laterality: N/A;    CHOLECYSTECTOMY      COLONOSCOPY      COLONOSCOPY N/A 06/10/2021    Procedure: COLONOSCOPY;  Surgeon: Sheree Metz MD;  Location: [facility];  Service: Endoscopy;  Laterality: N/A;    COLONOSCOPY N/A 03/08/2022    Procedure: COLONOSCOPY;  Surgeon: Maurilio Rodriguez MD;  Location: [facility] ([address] [place] [address]);  Service: Endoscopy;   Laterality: N/A;  Pt to perform at-home COVID test morning of procedure-DS  2/24-Blood thinner approval rec'[facility] Dr. Walsh (see letters tab 2/24/22)-DS  3/2-Instructions sent via email-DS  3/7-Pt unable to come earlier due to ride-DS    CORONARY STENT PLACEMENT      CYSTOURETHROSCOPY N/A 11/13/2019    Procedure: CYSTOURETHROSCOPY;  Surgeon: Shun Nuñez MD;  Location: [facility] OR;  Service: Urology;  Laterality: N/A;    DIRECT DIAGNOSTIC LARYNGOSCOPY WITH BRONCHOSCOPY AND ESOPHAGOSCOPY N/A 01/03/2024    Procedure: LARYNGOSCOPY, DIRECT, DIAGNOSTIC, WITH BRONCHOSCOPY AND ESOPHAGOSCOPY;  Surgeon: Mir Belle MD;  Location: [facility] OR;  Service: ENT;  Laterality: N/A;    ENDOBRONCHIAL ULTRASOUND Bilateral 2/20/2025    Procedure: ENDOBRONCHIAL ULTRASOUND (EBUS);  Surgeon: Ismael Cooper MD;  Location: [facility] OR;  Service: Pulmonary;  Laterality: Bilateral;    ENDOSCOPIC ULTRASOUND OF LOWER GASTROINTESTINAL TRACT N/A 03/08/2022    Procedure: ULTRASOUND, LOWER GI TRACT, ENDOSCOPIC;  Surgeon: Maurilio Rodriguez MD;  Location: [facility] ([address] [place] [address]);  Service: Endoscopy;  Laterality: N/A;  Pt to perform at-home COVID test morning of procedure-DS  [facility]see letters tab 2/24/22[facility]DS  3/2-Instructions sent via email-DS  3/7-Pt unable to come earlier due to ride-DS    ENDOSCOPIC ULTRASOUND OF UPPER GASTROINTESTINAL TRACT N/A 11/25/2019    Procedure: ULTRASOUND, UPPER GI TRACT, ENDOSCOPIC;  Surgeon: Alhaji Bridges MD;  Location: [facility] (2ND FLR);  Service: Endoscopy;  Laterality: N/A;  5 day hold Plavix, Dr Jovanni Serrano - pg  PM prep    ENDOSCOPIC ULTRASOUND OF UPPER GASTROINTESTINAL TRACT N/A 11/02/2020    Procedure: ULTRASOUND, UPPER GI TRACT, ENDOSCOPIC;  Surgeon: Maurilio Rodriguez MD;  Location: [facility] ENDO (2ND FLR);  Service: Endoscopy;  Laterality: N/A;  5 day hold Plavix, Dr Roman Walsh - pg  Covid-19 test 10/30/20 at [place] -     EPIDURAL STEROID INJECTION INTO CERVICAL SPINE  N/A 12/08/2021    Procedure: Injection-steroid-epidural-cervical;  Surgeon: Socorro Lorenz MD;  Location: [facility] OR;  Service: Pain Management;  Laterality: N/A;    EPIDURAL STEROID INJECTION INTO CERVICAL SPINE N/A 11/30/2023    Procedure: Injection-steroid-epidural-cervical;  Surgeon: Maurilio Carroll MD;  Location: [facility] OR;  Service: Anesthesiology;  Laterality: N/A;  c7-T1    ESOPHAGOGASTRODUODENOSCOPY N/A 06/10/2021    Procedure: EGD (ESOPHAGOGASTRODUODENOSCOPY);  Surgeon: Sheree Metz MD;  Location: [facility] ENDO;  Service: Endoscopy;  Laterality: N/A;    ESOPHAGOGASTRODUODENOSCOPY N/A 09/26/2024    Procedure: EGD (ESOPHAGOGASTRODUODENOSCOPY);  Surgeon: Jovanny Saldivar MD;  Location: [facility] ENDO;  Service: Endoscopy;  Laterality: N/A;    ESOPHAGOSCOPY, USING BOUGIE, WITH DILATION N/A 01/03/2024    Procedure: ESOPHAGOSCOPY, USING BOUGIE, WITH DILATION;  Surgeon: Mir Belle MD;  Location: [facility] OR;  Service: ENT;  Laterality: N/A;    FRACTURE SURGERY Left 05/02/2022    ankle    HERNIA REPAIR      HYSTERECTOMY      INCISIONAL HERNIA REPAIR      INJECTION OF ANESTHETIC AGENT AROUND NERVE Right 05/27/2019    Procedure: RIGHT L5-S3 MEDIAL BRANCH BLOCKS;  Surgeon: Sneha Aragon MD;  Location: [facility] OR;  Service: Pain Management;  Laterality: Right;  **DO NOT STOP PLAVIX**    INJECTION OF JOINT Right 09/22/2021    Procedure: Injection, Joint; Right SI Joint Injection;  Surgeon: Socorro Lorenz MD;  Location: [facility] OR;  Service: Pain Management;  Laterality: Right;  Oral    INSERTION OF DORSAL COLUMN NERVE STIMULATOR FOR TRIAL N/A 06/07/2021    Procedure: INSERTION, NEUROSTIMULATOR, SPINAL CORD, DORSAL COLUMN, FOR TRIAL;  Surgeon: Socorro Lorenz MD;  Location: [facility] OR;  Service: Pain Management;  Laterality: N/A;  nevro rep confirmed start time    INSERTION OF TUNNELED CENTRAL VENOUS CATHETER (CVC) WITH SUBCUTANEOUS PORT N/A 5/7/2025    Procedure: [id number];  Surgeon: Janet Jarrett MD;   "Location: [facility] OR;  Service: General;  Laterality: N/A;    instestine      bowel section    KNEE ARTHROPLASTY Right 12/18/2019    Procedure: ARTHROPLASTY, KNEE;  Surgeon: Ike Briceño II, MD;  Location: [facility] OR;  Service: Orthopedics;  Laterality: Right;    KNEE SURGERY  1983    OOPHORECTOMY      PARATHYROID GLAND SURGERY      3 surgeries    RADIOFREQUENCY ABLATION Right 06/10/2019    Procedure: Radiofrequency Ablation - RIGHT L3-5 RADIOFREQUENCY ABLATION WITH HALYARD COOLIEF THERMAL SYSTEM;  Surgeon: Sneha Aragon MD;  Location: [facility] OR;  Service: Pain Management;  Laterality: Right;  **HOLD PLAVIX x 7 DAYS PRIOR**    REVISION OF KNEE ARTHROPLASTY Right     AVALA with Dr Singh    ROBOTIC BRONCHOSCOPY Bilateral 2/20/2025    Procedure: ROBOTIC BRONCHOSCOPY;  Surgeon: Ismael Cooper MD;  Location: [facility] OR;  Service: Pulmonary;  Laterality: Bilateral;    SPINAL CORD STIMULATOR REMOVAL N/A 02/21/2024    Procedure: REMOVAL, NEUROSTIMULATOR, SPINAL;  Surgeon: Maurilio Carroll MD;  Location: [facility] OR;  Service: Pain Management;  Laterality: N/A;  removal of spinal cord stimulator    UPPER GASTROINTESTINAL ENDOSCOPY         Family History:   Family History   Problem Relation Name Age of Onset    Stroke Maternal Grandmother      Cancer Maternal Grandfather      Stroke Mother      Heart disease Father      Breast cancer Sister         Social History:  reports that she quit smoking about 2 years ago. Her smoking use included cigarettes. She started smoking about 48 years ago. She has a 46 pack-year smoking history. She has never used smokeless tobacco. She reports that she does not currently use alcohol. She reports current drug use.    Allergies:  Review of patient's allergies indicates:   Allergen Reactions    Aspirin Other (See Comments)     "Makes stomach feel like it's on fire"    Phenergan [promethazine] Other (See Comments)     SEIZURES    Nickel     Lortab [hydrocodone-acetaminophen] Itching     Able to take generic " "Norco       Medications:  Current Medications[1]    Review of Systems   Constitutional:  Positive for fatigue. Negative for chills, fever and unexpected weight change.   HENT:  Positive for mouth sores.    Respiratory:  Negative for cough and shortness of breath.    Cardiovascular:  Negative for chest pain and palpitations.   Gastrointestinal:  Positive for diarrhea. Negative for abdominal pain, constipation, nausea and vomiting.   Skin:  Negative for rash.   Neurological:  Negative for headaches.   Hematological:  Negative for adenopathy. Does not bruise/bleed easily.       ECOG Performance Status: 2   Objective:      Vitals:   Vitals:    05/26/25 0954   BP: 114/67   BP Location: Left arm   Patient Position: Sitting   Pulse: 66   Resp: 16   Temp: 97.9 °F (36.6 °C)   TempSrc: Temporal   SpO2: 97%   Weight: 89 kg (196 lb 3.4 oz)   Height: 5' 7" (1.702 m)             Physical Exam  Constitutional:       General: She is not in acute distress.     Appearance: She is well-developed. She is not diaphoretic.   HENT:      Head: Normocephalic and atraumatic.   Cardiovascular:      Rate and Rhythm: Normal rate and regular rhythm.      Heart sounds: Normal heart sounds. No murmur heard.     No friction rub. No gallop.   Pulmonary:      Effort: Pulmonary effort is normal. No respiratory distress.      Breath sounds: Normal breath sounds. No wheezing or rales.   Chest:      Chest wall: No tenderness.   Abdominal:      General: Bowel sounds are normal. There is no distension.      Palpations: Abdomen is soft. There is no mass.      Tenderness: There is no abdominal tenderness. There is no guarding or rebound.   Lymphadenopathy:      Cervical: No cervical adenopathy.      Upper Body:      Right upper body: No supraclavicular or axillary adenopathy.      Left upper body: No supraclavicular or axillary adenopathy.   Skin:     Findings: No erythema or rash.   Neurological:      Mental Status: She is alert and oriented to person, " place, and time.   Psychiatric:         Behavior: Behavior normal.         Laboratory Data:  Lab Visit on 05/26/2025   Component Date Value Ref Range Status    Sodium 05/26/2025 139  136 - 145 mmol/L Final    Potassium 05/26/2025 4.1  3.5 - 5.1 mmol/L Final    Chloride 05/26/2025 108  95 - 110 mmol/L Final    CO2 05/26/2025 20 (L)  23 - 29 mmol/L Final    Glucose 05/26/2025 106  70 - 110 mg/dL Final    BUN 05/26/2025 20  8 - 23 mg/dL Final    Creatinine 05/26/2025 1.0  0.5 - 1.4 mg/dL Final    Calcium 05/26/2025 10.4  8.7 - 10.5 mg/dL Final    Protein Total 05/26/2025 6.9  6.0 - 8.4 gm/dL Final    Albumin 05/26/2025 3.8  3.5 - 5.2 g/dL Final    Bilirubin Total 05/26/2025 0.3  0.1 - 1.0 mg/dL Final    For infants and newborns, interpretation of results should be based   on gestational age, weight and in agreement with clinical   observations.    Premature Infant recommended reference ranges:   0-24 hours:  <8.0 mg/dL   24-48 hours: <12.0 mg/dL   3-5 days:    <15.0 mg/dL   6-29 days:   <15.0 mg/dL    ALP 05/26/2025 89  40 - 150 unit/L Final    AST 05/26/2025 18  11 - 45 unit/L Final    ALT 05/26/2025 22  10 - 44 unit/L Final    Anion Gap 05/26/2025 11  8 - 16 mmol/L Final    eGFR 05/26/2025 >60  >60 mL/min/1.73/m2 Final    Estimated GFR calculated using the CKD-EPI creatinine (2021) equation.    WBC 05/26/2025 2.92 (L)  3.90 - 12.70 K/uL Final    RBC 05/26/2025 3.47 (L)  4.00 - 5.40 M/uL Final    HGB 05/26/2025 10.3 (L)  12.0 - 16.0 gm/dL Final    HCT 05/26/2025 32.1 (L)  37.0 - 48.5 % Final    MCV 05/26/2025 93  82 - 98 fL Final    MCH 05/26/2025 29.7  27.0 - 31.0 pg Final    MCHC 05/26/2025 32.1  32.0 - 36.0 g/dL Final    RDW 05/26/2025 15.7 (H)  11.5 - 14.5 % Final    Platelet Count 05/26/2025 252  150 - 450 K/uL Final    MPV 05/26/2025 8.4 (L)  9.2 - 12.9 fL Final    Nucleated RBC 05/26/2025 0  <=0 /100 WBC Final    Neut % 05/26/2025 55.8  38 - 73 % Final    Lymph % 05/26/2025 34.2  18 - 48 % Final    Mono %  05/26/2025 4.8  4 - 15 % Final    Eos % 05/26/2025 3.8  <=8 % Final    Basophil % 05/26/2025 0.7  <=1.9 % Final    Imm Grans % 05/26/2025 0.7 (H)  0.0 - 0.5 % Final    Neut # 05/26/2025 1.63 (L)  1.8 - 7.7 K/uL Final    Lymph # 05/26/2025 1.00  1 - 4.8 K/uL Final    Mono # 05/26/2025 0.14 (L)  0.3 - 1 K/uL Final    Eos # 05/26/2025 0.11  <=0.5 K/uL Final    Baso # 05/26/2025 0.02  <=0.2 K/uL Final    Imm Grans # 05/26/2025 0.02  0.00 - 0.04 K/uL Final    Mild elevation in immature granulocytes is non specific and can be seen in a variety of conditions including stress response, acute inflammation, trauma and pregnancy. Correlation with other laboratory and clinical findings is essential.         Imaging:     MRI Brain 3/07/25  Gray matter distinction is preserved throughout the brain parenchyma. The ventricles are midline without mass effect. There is mild periventricular and deep white matter FLAIR and T2 hyperintensities likely reflecting changes of chronic microvascular ischemia. No intra-axial hemorrhage or restricted diffusion. No intra-axial fluid collection or mass. Bone marrow signal of the calvarium is within normal limits. Major vascular flow voids are within normal limits. The orbits globes and optic nerves are grossly unremarkable. Paranasal sinuses are unremarkable.    PET/CT 3/10/25  Quality of the study: Adequate.     There is increased FDG avidity within a posterior right upper lobe pulmonary nodule measuring 2.0 cm on transmission CT images. This shows max SUV of 3.6 on today's exam versus 1.8 on prior exam.     No other abnormal regions of FDG uptake are identified.     Physiologic uptake of the tracer is present within the brain, salivary glands, myocardium, GI and  tracts.     Incidental CT findings: N/A       Assessment:       1. Cancer of lower lobe of right lung    2. Immunodeficiency due to chemotherapy    3. Arachnoid membrane inflammation    4. Hypertension, unspecified type    5. MEN1  (multiple endocrine neoplasia)    6. Hypothyroidism, unspecified type    7. Pancreatic insufficiency    8. Mouth sores                   Plan:     NSCLC - Pt with stage IIIa pT1cN2 NSCLC SCC of the RLL with met to station 7 LN  -TEMPUS tissue testing showed mutations in CDKN2A, FAT1, and TP53.  PD-L1 was <1%  -MRI brain KRISHNA on 3/07/25  -Plan for Carboplatin and Paclitaxel with XRT followed by year treatment with Durvalumab  -Will proceed with cycle 3 of chemo  Visit today included increased complexity associated with the care of the episodic problem (chemotherapy) addressed and managing the longitudinal care of the patient due to the serious and/or complex managed problem(s) NSCLC.    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  -Dr Staples 4/15/25 felt the patient needed fenestration with neurosurgery  -Pt following with Dr Freddy Burch    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  -Pt to see genetecist    HTN - pt on amlodipine, hydralazine, metoprolol  -BP controlled  -Will monitor    Hypothyroidism - pt on levothyroxine 75mcg daily  -WIll monitor    Pancreatic Insufficiency - due to prior chronic pancreatitis   -Patient on Zenpep   -Management per GI    Leukopenia  - due to radiation and chemo  -Will monitor    Normocytic Anemia - stable  Lab Results   Component Value Date    HGB 10.3 (L) 05/26/2025   -Will monitor    Mouth Sores - from  chemo  -Will prescribe jarquin's solution      Route Chart for Scheduling    Med Onc Chart Routing      Follow up with physician 2 weeks. Pt can proceed with treatment.  Pt has labs and a clinic visit with NP in 1 weeks.  Pt sees me in 2 weeks with labs and clinic visit/treatment.   Follow up with [facility] 1 week.   Infusion scheduling note    Injection scheduling note    Labs    Imaging    Pharmacy appointment    Other referrals              Treatment Plan Information   OP NSCLC PACLitaxel CARBOplatin QW + radiation Sea William MD   Associated diagnosis: Cancer of lower lobe of right lung  cT1c, cN2(f), cM0 noted on 3/14/2025   Line of treatment: Adjuvant  Treatment Goal: Curative     Upcoming Treatment Dates - OP NSCLC PACLitaxel CARBOplatin QW + radiation    6/2/2025       Pre-Medications       palonosetron 0.25mg/dexAMETHasone 12mg in NS IVPB 0.25 mg 50 mL       diphenhydrAMINE (BENADRYL) 50 mg in NS 50 mL IVPB       famotidine (PF) injection 20 mg       Chemotherapy       PACLitaxeL (TAXOL) 45 mg/m2 = 90 mg in 0.9% NaCl 250 mL chemo infusion       CARBOplatin (PARAPLATIN) 200 mg in 0.9% NaCl 270 mL chemo infusion  6/9/2025       Pre-Medications       palonosetron 0.25mg/dexAMETHasone 12mg in NS IVPB 0.25 mg 50 mL       diphenhydrAMINE (BENADRYL) 50 mg in NS 50 mL IVPB       famotidine (PF) injection 20 mg       Chemotherapy       PACLitaxeL (TAXOL) 45 mg/m2 = 90 mg in 0.9% NaCl 250 mL chemo infusion       CARBOplatin (PARAPLATIN) 200 mg in 0.9% NaCl 270 mL chemo infusion  6/16/2025       Pre-Medications       palonosetron 0.25mg/dexAMETHasone 12mg in NS IVPB 0.25 mg 50 mL       diphenhydrAMINE (BENADRYL) 50 mg in NS 50 mL IVPB       famotidine (PF) injection 20 mg       Chemotherapy       PACLitaxeL (TAXOL) 45 mg/m2 = 90 mg in 0.9% NaCl 250 mL chemo infusion       CARBOplatin (PARAPLATIN) 200 mg in 0.9% NaCl 270 mL chemo infusion  6/23/2025       Pre-Medications       palonosetron 0.25mg/dexAMETHasone 12mg in NS  IVPB 0.25 mg 50 mL       diphenhydrAMINE (BENADRYL) 50 mg in NS 50 mL IVPB       famotidine (PF) injection 20 mg       Chemotherapy       PACLitaxeL (TAXOL) 45 mg/m2 = 90 mg in 0.9% NaCl 250 mL chemo infusion       CARBOplatin (PARAPLATIN) 200 mg in 0.9% NaCl 270 mL chemo infusion      Sea William MD  [facility]  Hematology and Oncology  [facility]   [address]   [place] [address]   O: [phone]  F: [phone]                     [1]   Current Outpatient Medications   Medication Sig Dispense Refill    acetaminophen (TYLENOL) 325 MG tablet Take 2 tablets (650 mg total) by mouth every 6 (six) hours as needed for Pain (Do not take with any other tylenol containing products).  0    allopurinoL (ZYLOPRIM) 300 MG tablet TAKE 1 TABLET DAILY 90 tablet 1    amLODIPine (NORVASC) 5 MG tablet TAKE 1 TABLET(5 MG) BY MOUTH TWICE DAILY 180 tablet 1    busPIRone (BUSPAR) 5 MG Tab Take 1 tablet (5 mg total) by mouth 2 (two) times daily. 180 tablet 3    calcium carbonate (CALCIUM 500) 500 mg calcium (1,250 mg) chewable tablet Take 1 tablet by mouth once daily.      clopidogreL (PLAVIX) 75 mg tablet Take 1 tablet (75 mg total) by mouth once daily. 90 tablet 3    colestipoL (COLESTID) 1 gram Tab TAKE 2 TABLETS ONCE DAILY 180 tablet 1    cyanocobalamin 1,000 mcg/mL injection INJECT 1 ML IN THE MUSCLE EVERY 14 DAYS 10 mL 0    diphenhydrAMINE-aluminum-magnesium hydroxide-simethicone-LIDOcaine viscous HCl 2% Swish and spit 15 mLs every 4 (four) hours as needed. 500 each 3    EScitalopram oxalate (LEXAPRO) 20 MG tablet TAKE 1 TABLET EVERY EVENING 90 tablet 3    fluticasone propionate (FLONASE) 50 mcg/actuation nasal spray 2 sprays (100 mcg total) by Each Nostril route 2 (two) times daily. 48 g 3    furosemide (LASIX) 20 MG tablet TAKE 1 TABLET(20 MG) BY MOUTH EVERY DAY 90 tablet 3    gabapentin (NEURONTIN) 400 MG capsule Take 1 capsule (400 mg total) by mouth 2 (two) times daily. 180  capsule 3    hydrALAZINE (APRESOLINE) 25 MG tablet Take 1 tablet (25 mg total) by mouth daily as needed (Check BP after taking amlodipine, Lasix, metoprolol.  If it continues to stay greater than 160/100 take hydralazine). 30 tablet 0    levothyroxine (SYNTHROID) 75 MCG tablet TAKE 1 TABLET DAILY 90 tablet 1    LIDOcaine-prilocaine (EMLA) cream Apply topically as needed (Apply 30 minutes prior to port access). 30 g 2    magnesium oxide (MAG-OX) 400 mg (241.3 mg magnesium) tablet Take 1 tablet (400 mg total) by mouth once daily.      methocarbamoL (ROBAXIN) 750 MG Tab Take 1 tablet (750 mg total) by mouth every 8 (eight) hours as needed (muscle spasm). 270 tablet 3    metoprolol succinate (TOPROL-XL) 50 MG 24 hr tablet Take 1 tablet (50 mg total) by mouth once daily. Pt takes 1/2 tab daily 90 tablet 2    mupirocin (BACTROBAN) 2 % ointment Apply topically once daily.      OLANZapine (ZYPREXA) 5 MG tablet Take 1 tablet (5 mg total) by mouth every evening. On days 1-3 of each chemotherapy cycle. 12 tablet 1    ondansetron (ZOFRAN) 8 MG tablet Take 1 tablet (8 mg total) by mouth every 8 (eight) hours as needed for Nausea. 30 tablet 2    oxyCODONE-acetaminophen (PERCOCET)  mg per tablet Take 1 tablet by mouth every 8 (eight) hours as needed.      oxyCODONE-acetaminophen (PERCOCET) 5-325 mg per tablet Take 1 tablet by mouth every 8 (eight) hours as needed for Pain.      pantoprazole (PROTONIX) 40 MG tablet TAKE 1 TABLET DAILY 90 tablet 3    potassium chloride SA (K-DUR,KLOR-CON M) 10 MEQ tablet Take 1 tablet (10 mEq total) by mouth once daily. 90 tablet 3    rosuvastatin (CRESTOR) 10 MG tablet TAKE 1 TABLET DAILY 90 tablet 3    traMADoL (ULTRAM) 50 mg tablet Take 1 tablet (50 mg total) by mouth every 6 (six) hours as needed for Pain. 10 tablet 0    traZODone (DESYREL) 100 MG tablet TAKE 1 TABLET EVERY EVENING 90 tablet 3    triamcinolone acetonide 0.1% (KENALOG) 0.1 % ointment Apply topically 2 (two) times daily.       vit A/vit C/vit E/zinc/copper (PRESERVISION AREDS ORAL) Take 1 capsule by mouth 2 (two) times a day.      vitamin D (VITAMIN D3) 1000 units Tab Take 1,000 Units by mouth 2 (two) times a day.      duke's soln (benadryl 30 mL, mylanta 30 mL, LIDOcaine 30 mL, nystatin 30 mL) 120mL Take 10 mLs by mouth 4 (four) times daily. 120 mL 0     No current facility-administered medications for this visit.     Facility-Administered Medications Ordered in Other Visits   Medication Dose Route Frequency Provider Last Rate Last Admin    oxyCODONE immediate release tablet 5 mg  5 mg Oral Once PRN Paul Nunez MD

## 2025-05-26 ENCOUNTER — INFUSION (OUTPATIENT)
Dept: INFUSION THERAPY | Facility: HOSPITAL | Age: 70
End: 2025-05-26
Attending: INTERNAL MEDICINE
Payer: MEDICARE

## 2025-05-26 ENCOUNTER — OFFICE VISIT (OUTPATIENT)
Dept: HEMATOLOGY/ONCOLOGY | Facility: CLINIC | Age: 70
End: 2025-05-26
Payer: MEDICARE

## 2025-05-26 ENCOUNTER — LAB VISIT (OUTPATIENT)
Dept: LAB | Facility: HOSPITAL | Age: 70
End: 2025-05-26
Attending: INTERNAL MEDICINE
Payer: MEDICARE

## 2025-05-26 ENCOUNTER — DOCUMENTATION ONLY (OUTPATIENT)
Dept: INFUSION THERAPY | Facility: HOSPITAL | Age: 70
End: 2025-05-26

## 2025-05-26 VITALS
TEMPERATURE: 98 F | SYSTOLIC BLOOD PRESSURE: 128 MMHG | RESPIRATION RATE: 18 BRPM | BODY MASS INDEX: 30.79 KG/M2 | HEART RATE: 76 BPM | DIASTOLIC BLOOD PRESSURE: 66 MMHG | HEIGHT: 67 IN | OXYGEN SATURATION: 97 % | WEIGHT: 196.19 LBS

## 2025-05-26 VITALS
RESPIRATION RATE: 16 BRPM | OXYGEN SATURATION: 97 % | WEIGHT: 196.19 LBS | DIASTOLIC BLOOD PRESSURE: 67 MMHG | HEIGHT: 67 IN | TEMPERATURE: 98 F | SYSTOLIC BLOOD PRESSURE: 114 MMHG | HEART RATE: 66 BPM | BODY MASS INDEX: 30.79 KG/M2

## 2025-05-26 DIAGNOSIS — C34.31 CANCER OF LOWER LOBE OF RIGHT LUNG: Primary | ICD-10-CM

## 2025-05-26 DIAGNOSIS — K13.79 MOUTH SORES: ICD-10-CM

## 2025-05-26 DIAGNOSIS — I10 HYPERTENSION, UNSPECIFIED TYPE: ICD-10-CM

## 2025-05-26 DIAGNOSIS — E03.9 HYPOTHYROIDISM, UNSPECIFIED TYPE: ICD-10-CM

## 2025-05-26 DIAGNOSIS — G03.9: ICD-10-CM

## 2025-05-26 DIAGNOSIS — K86.89 PANCREATIC INSUFFICIENCY: ICD-10-CM

## 2025-05-26 DIAGNOSIS — E31.21 MEN1 (MULTIPLE ENDOCRINE NEOPLASIA): ICD-10-CM

## 2025-05-26 DIAGNOSIS — D84.821 IMMUNODEFICIENCY DUE TO CHEMOTHERAPY: ICD-10-CM

## 2025-05-26 DIAGNOSIS — Z79.69 IMMUNODEFICIENCY DUE TO CHEMOTHERAPY: ICD-10-CM

## 2025-05-26 DIAGNOSIS — T45.1X5A IMMUNODEFICIENCY DUE TO CHEMOTHERAPY: ICD-10-CM

## 2025-05-26 DIAGNOSIS — C34.31 CANCER OF LOWER LOBE OF RIGHT LUNG: ICD-10-CM

## 2025-05-26 LAB
ABSOLUTE EOSINOPHIL (OHS): 0.11 K/UL
ABSOLUTE MONOCYTE (OHS): 0.14 K/UL (ref 0.3–1)
ABSOLUTE NEUTROPHIL COUNT (OHS): 1.63 K/UL (ref 1.8–7.7)
ALBUMIN SERPL BCP-MCNC: 3.8 G/DL (ref 3.5–5.2)
ALP SERPL-CCNC: 89 UNIT/L (ref 40–150)
ALT SERPL W/O P-5'-P-CCNC: 22 UNIT/L (ref 10–44)
ANION GAP (OHS): 11 MMOL/L (ref 8–16)
AST SERPL-CCNC: 18 UNIT/L (ref 11–45)
BASOPHILS # BLD AUTO: 0.02 K/UL
BASOPHILS NFR BLD AUTO: 0.7 %
BILIRUB SERPL-MCNC: 0.3 MG/DL (ref 0.1–1)
BUN SERPL-MCNC: 20 MG/DL (ref 8–23)
CALCIUM SERPL-MCNC: 10.4 MG/DL (ref 8.7–10.5)
CHLORIDE SERPL-SCNC: 108 MMOL/L (ref 95–110)
CO2 SERPL-SCNC: 20 MMOL/L (ref 23–29)
CREAT SERPL-MCNC: 1 MG/DL (ref 0.5–1.4)
ERYTHROCYTE [DISTWIDTH] IN BLOOD BY AUTOMATED COUNT: 15.7 % (ref 11.5–14.5)
GFR SERPLBLD CREATININE-BSD FMLA CKD-EPI: >60 ML/MIN/1.73/M2
GLUCOSE SERPL-MCNC: 106 MG/DL (ref 70–110)
HCT VFR BLD AUTO: 32.1 % (ref 37–48.5)
HGB BLD-MCNC: 10.3 GM/DL (ref 12–16)
IMM GRANULOCYTES # BLD AUTO: 0.02 K/UL (ref 0–0.04)
IMM GRANULOCYTES NFR BLD AUTO: 0.7 % (ref 0–0.5)
LYMPHOCYTES # BLD AUTO: 1 K/UL (ref 1–4.8)
MCH RBC QN AUTO: 29.7 PG (ref 27–31)
MCHC RBC AUTO-ENTMCNC: 32.1 G/DL (ref 32–36)
MCV RBC AUTO: 93 FL (ref 82–98)
NUCLEATED RBC (/100WBC) (OHS): 0 /100 WBC
PLATELET # BLD AUTO: 252 K/UL (ref 150–450)
PMV BLD AUTO: 8.4 FL (ref 9.2–12.9)
POTASSIUM SERPL-SCNC: 4.1 MMOL/L (ref 3.5–5.1)
PROT SERPL-MCNC: 6.9 GM/DL (ref 6–8.4)
RBC # BLD AUTO: 3.47 M/UL (ref 4–5.4)
RELATIVE EOSINOPHIL (OHS): 3.8 %
RELATIVE LYMPHOCYTE (OHS): 34.2 % (ref 18–48)
RELATIVE MONOCYTE (OHS): 4.8 % (ref 4–15)
RELATIVE NEUTROPHIL (OHS): 55.8 % (ref 38–73)
SODIUM SERPL-SCNC: 139 MMOL/L (ref 136–145)
WBC # BLD AUTO: 2.92 K/UL (ref 3.9–12.7)

## 2025-05-26 PROCEDURE — 99215 OFFICE O/P EST HI 40 MIN: CPT | Mod: PBBFAC,25,PN | Performed by: INTERNAL MEDICINE

## 2025-05-26 PROCEDURE — 85025 COMPLETE CBC W/AUTO DIFF WBC: CPT | Mod: PN

## 2025-05-26 PROCEDURE — 96413 CHEMO IV INFUSION 1 HR: CPT | Mod: PN

## 2025-05-26 PROCEDURE — 63600175 PHARM REV CODE 636 W HCPCS: Mod: PN | Performed by: INTERNAL MEDICINE

## 2025-05-26 PROCEDURE — 96367 TX/PROPH/DG ADDL SEQ IV INF: CPT | Mod: PN

## 2025-05-26 PROCEDURE — 99999 PR PBB SHADOW E&M-EST. PATIENT-LVL V: CPT | Mod: PBBFAC,,, | Performed by: INTERNAL MEDICINE

## 2025-05-26 PROCEDURE — A4216 STERILE WATER/SALINE, 10 ML: HCPCS | Mod: PN | Performed by: INTERNAL MEDICINE

## 2025-05-26 PROCEDURE — 36415 COLL VENOUS BLD VENIPUNCTURE: CPT | Mod: PN

## 2025-05-26 PROCEDURE — 80053 COMPREHEN METABOLIC PANEL: CPT | Mod: PN

## 2025-05-26 PROCEDURE — G2211 COMPLEX E/M VISIT ADD ON: HCPCS | Mod: S$PBB,,, | Performed by: INTERNAL MEDICINE

## 2025-05-26 PROCEDURE — 99215 OFFICE O/P EST HI 40 MIN: CPT | Mod: S$PBB,,, | Performed by: INTERNAL MEDICINE

## 2025-05-26 PROCEDURE — 96417 CHEMO IV INFUS EACH ADDL SEQ: CPT | Mod: PN

## 2025-05-26 PROCEDURE — 96375 TX/PRO/DX INJ NEW DRUG ADDON: CPT | Mod: PN

## 2025-05-26 PROCEDURE — 25000003 PHARM REV CODE 250: Mod: PN | Performed by: INTERNAL MEDICINE

## 2025-05-26 RX ORDER — DIPHENHYDRAMINE HYDROCHLORIDE 50 MG/ML
50 INJECTION, SOLUTION INTRAMUSCULAR; INTRAVENOUS ONCE AS NEEDED
Status: DISCONTINUED | OUTPATIENT
Start: 2025-05-26 | End: 2025-05-26 | Stop reason: HOSPADM

## 2025-05-26 RX ORDER — SODIUM CHLORIDE 0.9 % (FLUSH) 0.9 %
10 SYRINGE (ML) INJECTION
Status: DISCONTINUED | OUTPATIENT
Start: 2025-05-26 | End: 2025-05-26 | Stop reason: HOSPADM

## 2025-05-26 RX ORDER — DIPHENHYDRAMINE HYDROCHLORIDE 50 MG/ML
50 INJECTION, SOLUTION INTRAMUSCULAR; INTRAVENOUS ONCE AS NEEDED
Status: CANCELLED | OUTPATIENT
Start: 2025-05-26

## 2025-05-26 RX ORDER — HEPARIN 100 UNIT/ML
500 SYRINGE INTRAVENOUS
Status: DISCONTINUED | OUTPATIENT
Start: 2025-05-26 | End: 2025-05-26 | Stop reason: HOSPADM

## 2025-05-26 RX ORDER — FAMOTIDINE 10 MG/ML
20 INJECTION, SOLUTION INTRAVENOUS
Status: CANCELLED | OUTPATIENT
Start: 2025-05-26

## 2025-05-26 RX ORDER — SODIUM CHLORIDE 0.9 % (FLUSH) 0.9 %
10 SYRINGE (ML) INJECTION
Status: CANCELLED | OUTPATIENT
Start: 2025-05-26

## 2025-05-26 RX ORDER — EPINEPHRINE 0.3 MG/.3ML
0.3 INJECTION SUBCUTANEOUS ONCE AS NEEDED
Status: CANCELLED | OUTPATIENT
Start: 2025-05-26

## 2025-05-26 RX ORDER — FAMOTIDINE 10 MG/ML
20 INJECTION, SOLUTION INTRAVENOUS
Status: COMPLETED | OUTPATIENT
Start: 2025-05-26 | End: 2025-05-26

## 2025-05-26 RX ORDER — EPINEPHRINE 0.3 MG/.3ML
0.3 INJECTION SUBCUTANEOUS ONCE AS NEEDED
Status: DISCONTINUED | OUTPATIENT
Start: 2025-05-26 | End: 2025-05-26 | Stop reason: HOSPADM

## 2025-05-26 RX ORDER — HEPARIN 100 UNIT/ML
500 SYRINGE INTRAVENOUS
Status: CANCELLED | OUTPATIENT
Start: 2025-05-26

## 2025-05-26 RX ADMIN — DEXAMETHASONE SODIUM PHOSPHATE 0.25 MG: 10 INJECTION, SOLUTION INTRAMUSCULAR; INTRAVENOUS at 10:05

## 2025-05-26 RX ADMIN — DIPHENHYDRAMINE HYDROCHLORIDE 50 MG: 50 INJECTION INTRAMUSCULAR; INTRAVENOUS at 11:05

## 2025-05-26 RX ADMIN — PACLITAXEL 90 MG: 6 INJECTION, SOLUTION INTRAVENOUS at 12:05

## 2025-05-26 RX ADMIN — FAMOTIDINE 20 MG: 10 INJECTION INTRAVENOUS at 10:05

## 2025-05-26 RX ADMIN — SODIUM CHLORIDE: 9 INJECTION, SOLUTION INTRAVENOUS at 10:05

## 2025-05-26 RX ADMIN — CARBOPLATIN 200 MG: 10 INJECTION, SOLUTION INTRAVENOUS at 01:05

## 2025-05-26 RX ADMIN — Medication 10 ML: at 01:05

## 2025-05-26 NOTE — PROGRESS NOTES
LCSW met with patient at the chairside during infusion. She was given a gas card. Ms. Nelson appeared to be in good spirits; she brought chili con queso for the staff today. Patient denied any emotional or practical needs at this time.  encouraged the patient to reach out if any needs arise.

## 2025-05-26 NOTE — PLAN OF CARE
Problem: Adult Inpatient Plan of Care  Goal: Patient-Specific Goal (Individualized)  Outcome: Progressing  Flowsheets (Taken 5/26/2025 1410)  Individualized Care Needs: Education, conversation, blanket, pillows, friend at chairside, dim lights  Anxieties, Fears or Concerns: Drowsy from benadryl  Patient/Family-Specific Goals (Include Timeframe): No s/s of reaction from treatment     Problem: Adult Inpatient Plan of Care  Goal: Plan of Care Review  Outcome: Progressing  Flowsheets (Taken 5/26/2025 1410)  Plan of Care Reviewed With: patient   Pt tolerated Taxol/Carbo infusion well, NAD. Pt education reinforced on potential side effects, what to expect and when to call the physician. Pt given a schedule and reviewed, pt verbalized understanding. Pt ambulated out of the clinic without difficulty independently.

## 2025-05-26 NOTE — PLAN OF CARE
Problem: Fatigue  Goal: Improved Activity Tolerance  5/26/2025 1411 by Eder Stock, RN  Outcome: Progressing  5/26/2025 1410 by Eder Stock, RN  Outcome: Progressing  Intervention: Promote Improved Energy  Flowsheets (Taken 5/26/2025 1411)  Fatigue Management:   activity assistance provided   fatigue-related activity identified   paced activity encouraged   frequent rest breaks encouraged  Sleep/Rest Enhancement:   natural light exposure provided   relaxation techniques promoted  Activity Management:   Ambulated -L4   Up in chair - L3  Environmental Support:   environmental consistency promoted   personal routine supported   rest periods encouraged

## 2025-05-27 ENCOUNTER — TELEPHONE (OUTPATIENT)
Dept: REHABILITATION | Facility: HOSPITAL | Age: 70
End: 2025-05-27
Payer: MEDICARE

## 2025-05-27 NOTE — TELEPHONE ENCOUNTER
Called pt to get her scheduled for New PT eval; pt was driving and will stop by my desk to schedule her New PT eval on today.

## 2025-05-27 NOTE — TELEPHONE ENCOUNTER
Pt came to my desk to schedule her New PT eval on 6/4/25 at 1:00 pm. Location and check-in process was discussed. An apt reminder sent via [employer].

## 2025-05-28 ENCOUNTER — DOCUMENTATION ONLY (OUTPATIENT)
Dept: RADIATION ONCOLOGY | Facility: CLINIC | Age: 70
End: 2025-05-28
Payer: MEDICARE

## 2025-05-28 DIAGNOSIS — C34.31 CANCER OF LOWER LOBE OF RIGHT LUNG: Primary | ICD-10-CM

## 2025-05-28 RX ORDER — SUCRALFATE 1 G/1
1 TABLET ORAL
Qty: 120 TABLET | Refills: 3 | Status: SHIPPED | OUTPATIENT
Start: 2025-05-28

## 2025-05-28 NOTE — PLAN OF CARE
Completed 12 of 30 outpatient radiation treatments this visit.  Complains of burning sensation in the XRT field.  All questions and concerns addressed by MD this visit.

## 2025-05-29 ENCOUNTER — DOCUMENTATION ONLY (OUTPATIENT)
Dept: INFUSION THERAPY | Facility: HOSPITAL | Age: 70
End: 2025-05-29
Payer: MEDICARE

## 2025-05-29 ENCOUNTER — LAB VISIT (OUTPATIENT)
Dept: LAB | Facility: HOSPITAL | Age: 70
End: 2025-05-29
Attending: INTERNAL MEDICINE
Payer: MEDICARE

## 2025-05-29 ENCOUNTER — TELEPHONE (OUTPATIENT)
Dept: HEMATOLOGY/ONCOLOGY | Facility: CLINIC | Age: 70
End: 2025-05-29
Payer: MEDICARE

## 2025-05-29 DIAGNOSIS — R04.0 BLEEDING FROM THE NOSE: Primary | ICD-10-CM

## 2025-05-29 DIAGNOSIS — R04.0 BLEEDING FROM THE NOSE: ICD-10-CM

## 2025-05-29 LAB
ABSOLUTE EOSINOPHIL (OHS): 0.02 K/UL
ABSOLUTE MONOCYTE (OHS): 0.1 K/UL (ref 0.3–1)
ABSOLUTE NEUTROPHIL COUNT (OHS): 1.73 K/UL (ref 1.8–7.7)
BASOPHILS # BLD AUTO: 0.01 K/UL
BASOPHILS NFR BLD AUTO: 0.4 %
ERYTHROCYTE [DISTWIDTH] IN BLOOD BY AUTOMATED COUNT: 16.6 % (ref 11.5–14.5)
HCT VFR BLD AUTO: 31.5 % (ref 37–48.5)
HGB BLD-MCNC: 10 GM/DL (ref 12–16)
IMM GRANULOCYTES # BLD AUTO: 0.01 K/UL (ref 0–0.04)
IMM GRANULOCYTES NFR BLD AUTO: 0.4 % (ref 0–0.5)
LYMPHOCYTES # BLD AUTO: 0.67 K/UL (ref 1–4.8)
MCH RBC QN AUTO: 29.5 PG (ref 27–31)
MCHC RBC AUTO-ENTMCNC: 31.7 G/DL (ref 32–36)
MCV RBC AUTO: 93 FL (ref 82–98)
NUCLEATED RBC (/100WBC) (OHS): 0 /100 WBC
PLATELET # BLD AUTO: 238 K/UL (ref 150–450)
PMV BLD AUTO: 8.5 FL (ref 9.2–12.9)
RBC # BLD AUTO: 3.39 M/UL (ref 4–5.4)
RELATIVE EOSINOPHIL (OHS): 0.8 %
RELATIVE LYMPHOCYTE (OHS): 26.4 % (ref 18–48)
RELATIVE MONOCYTE (OHS): 3.9 % (ref 4–15)
RELATIVE NEUTROPHIL (OHS): 68.1 % (ref 38–73)
WBC # BLD AUTO: 2.54 K/UL (ref 3.9–12.7)

## 2025-05-29 PROCEDURE — 36415 COLL VENOUS BLD VENIPUNCTURE: CPT | Mod: PN

## 2025-05-29 PROCEDURE — 85025 COMPLETE CBC W/AUTO DIFF WBC: CPT | Mod: PN

## 2025-05-29 NOTE — TELEPHONE ENCOUNTER
Patient called having a couple nose bleeds in the last couple day. She has a history of chronic sinus issues, was able to stop the bleeding and it has not returned today. Spoke to Benita Np who advised to check CBC today and if all looks stable have patient follow up with ENT as soon as possible. CBC did come back stable and patient advised to call ENT to get it and if she had any trouble to let me know. She verbalized understanding.

## 2025-05-29 NOTE — ASSESSMENT & PLAN NOTE
She has had multiple c diff infections.  She is responsive to flagyl.  She has a GI team.    Will try to keep antibiotics short course due to this  Augmentin, doxy, cefixime, levaquin all have c diff risk.   
If you are a smoker, it is important for your health to stop smoking. Please be aware that second hand smoke is also harmful.

## 2025-05-30 ENCOUNTER — TELEPHONE (OUTPATIENT)
Dept: HEMATOLOGY/ONCOLOGY | Facility: CLINIC | Age: 70
End: 2025-05-30
Payer: MEDICARE

## 2025-05-30 ENCOUNTER — OUTPATIENT CASE MANAGEMENT (OUTPATIENT)
Dept: ADMINISTRATIVE | Facility: OTHER | Age: 70
End: 2025-05-30
Payer: MEDICARE

## 2025-05-30 NOTE — LETTER
Alessia Nelson  [address] [place] [address]      Dear Alessia Nelson,     I work with [facility] Outpatient Care Management Department. We received a referral to call you to discuss your medical history. These services are free of charge and are offered to [facility] patients who have recently been discharged from any of our facilities or who have medical conditions that may require the skill of a nurse to assist with management.     I am a Registered Nurse who specializes in connecting patients with available medical and financial resources as well as addressing any educational needs that may be indicated.    I attempted to reach you by telephone, but I was unsuccessful. Please call our department so that we can go over some questions with you, regarding your health.    The Outpatient Care Management Department can be reached at [phone], from 8:00AM to 4:30 PM, on Monday thru Friday.     Additionally, [facility] also has a program where a nurse is available 24/7 to answer questions or provide medical advice, their number is [phone].      Thanks,        Amy Shannon RN  Outpatient Care Management  Phone #: [phone]

## 2025-05-30 NOTE — PROGRESS NOTES
5/30/2025  1st attempt to complete Initial Assessment  for Outpatient Care Management, left message.  Will send via portal -  unable to assess letter.

## 2025-06-02 ENCOUNTER — OFFICE VISIT (OUTPATIENT)
Dept: HEMATOLOGY/ONCOLOGY | Facility: CLINIC | Age: 70
End: 2025-06-02
Payer: MEDICARE

## 2025-06-02 ENCOUNTER — OUTPATIENT CASE MANAGEMENT (OUTPATIENT)
Dept: ADMINISTRATIVE | Facility: OTHER | Age: 70
End: 2025-06-02
Payer: MEDICARE

## 2025-06-02 ENCOUNTER — HOSPITAL ENCOUNTER (OUTPATIENT)
Dept: RADIATION THERAPY | Facility: HOSPITAL | Age: 70
Discharge: HOME | End: 2025-06-02
Attending: RADIOLOGY
Payer: MEDICARE

## 2025-06-02 ENCOUNTER — INFUSION (OUTPATIENT)
Dept: INFUSION THERAPY | Facility: HOSPITAL | Age: 70
End: 2025-06-02
Attending: INTERNAL MEDICINE
Payer: MEDICARE

## 2025-06-02 ENCOUNTER — LAB VISIT (OUTPATIENT)
Dept: LAB | Facility: HOSPITAL | Age: 70
End: 2025-06-02
Attending: INTERNAL MEDICINE
Payer: MEDICARE

## 2025-06-02 VITALS
HEART RATE: 79 BPM | OXYGEN SATURATION: 97 % | RESPIRATION RATE: 18 BRPM | WEIGHT: 195.75 LBS | SYSTOLIC BLOOD PRESSURE: 142 MMHG | TEMPERATURE: 98 F | DIASTOLIC BLOOD PRESSURE: 68 MMHG | HEIGHT: 67 IN | BODY MASS INDEX: 30.72 KG/M2

## 2025-06-02 VITALS
HEIGHT: 67 IN | DIASTOLIC BLOOD PRESSURE: 71 MMHG | BODY MASS INDEX: 30.72 KG/M2 | WEIGHT: 195.75 LBS | RESPIRATION RATE: 16 BRPM | OXYGEN SATURATION: 97 % | TEMPERATURE: 98 F | HEART RATE: 63 BPM | SYSTOLIC BLOOD PRESSURE: 116 MMHG

## 2025-06-02 DIAGNOSIS — D70.1 CHEMOTHERAPY-INDUCED NEUTROPENIA: ICD-10-CM

## 2025-06-02 DIAGNOSIS — C34.90 MALIGNANT NEOPLASM OF UNSPECIFIED PART OF UNSPECIFIED BRONCHUS OR LUNG: Primary | ICD-10-CM

## 2025-06-02 DIAGNOSIS — C34.31 CANCER OF LOWER LOBE OF RIGHT LUNG: ICD-10-CM

## 2025-06-02 DIAGNOSIS — R04.0 EPISTAXIS: ICD-10-CM

## 2025-06-02 DIAGNOSIS — T45.1X5A CHEMOTHERAPY-INDUCED NEUTROPENIA: ICD-10-CM

## 2025-06-02 DIAGNOSIS — T45.1X5A IMMUNODEFICIENCY DUE TO CHEMOTHERAPY: ICD-10-CM

## 2025-06-02 DIAGNOSIS — E31.20 MEN (MULTIPLE ENDOCRINE NEOPLASIA): Chronic | ICD-10-CM

## 2025-06-02 DIAGNOSIS — K86.89 PANCREATIC INSUFFICIENCY: Chronic | ICD-10-CM

## 2025-06-02 DIAGNOSIS — C34.31 CANCER OF LOWER LOBE OF RIGHT LUNG: Primary | ICD-10-CM

## 2025-06-02 DIAGNOSIS — E03.8 OTHER SPECIFIED HYPOTHYROIDISM: ICD-10-CM

## 2025-06-02 DIAGNOSIS — D84.821 IMMUNODEFICIENCY DUE TO CHEMOTHERAPY: ICD-10-CM

## 2025-06-02 DIAGNOSIS — Z79.69 IMMUNODEFICIENCY DUE TO CHEMOTHERAPY: ICD-10-CM

## 2025-06-02 PROBLEM — D70.9 NEUTROPENIA: Status: ACTIVE | Noted: 2025-06-02

## 2025-06-02 LAB
ABSOLUTE EOSINOPHIL (OHS): 0 K/UL
ABSOLUTE MONOCYTE (OHS): 0.25 K/UL (ref 0.3–1)
ABSOLUTE NEUTROPHIL COUNT (OHS): 1.45 K/UL (ref 1.8–7.7)
ALBUMIN SERPL BCP-MCNC: 3.5 G/DL (ref 3.5–5.2)
ALP SERPL-CCNC: 82 UNIT/L (ref 40–150)
ALT SERPL W/O P-5'-P-CCNC: 16 UNIT/L (ref 10–44)
ANION GAP (OHS): 10 MMOL/L (ref 8–16)
AST SERPL-CCNC: 14 UNIT/L (ref 11–45)
BASOPHILS # BLD AUTO: 0.02 K/UL
BASOPHILS NFR BLD AUTO: 0.8 %
BILIRUB SERPL-MCNC: 0.4 MG/DL (ref 0.1–1)
BUN SERPL-MCNC: 16 MG/DL (ref 8–23)
CALCIUM SERPL-MCNC: 10.1 MG/DL (ref 8.7–10.5)
CHLORIDE SERPL-SCNC: 107 MMOL/L (ref 95–110)
CO2 SERPL-SCNC: 22 MMOL/L (ref 23–29)
CREAT SERPL-MCNC: 0.9 MG/DL (ref 0.5–1.4)
ERYTHROCYTE [DISTWIDTH] IN BLOOD BY AUTOMATED COUNT: 16.7 % (ref 11.5–14.5)
GFR SERPLBLD CREATININE-BSD FMLA CKD-EPI: >60 ML/MIN/1.73/M2
GLUCOSE SERPL-MCNC: 122 MG/DL (ref 70–110)
HCT VFR BLD AUTO: 31.2 % (ref 37–48.5)
HGB BLD-MCNC: 10 GM/DL (ref 12–16)
IMM GRANULOCYTES # BLD AUTO: 0.01 K/UL (ref 0–0.04)
IMM GRANULOCYTES NFR BLD AUTO: 0.4 % (ref 0–0.5)
LYMPHOCYTES # BLD AUTO: 0.88 K/UL (ref 1–4.8)
MCH RBC QN AUTO: 29.8 PG (ref 27–31)
MCHC RBC AUTO-ENTMCNC: 32.1 G/DL (ref 32–36)
MCV RBC AUTO: 93 FL (ref 82–98)
NUCLEATED RBC (/100WBC) (OHS): 0 /100 WBC
PLATELET # BLD AUTO: 207 K/UL (ref 150–450)
PMV BLD AUTO: 8.6 FL (ref 9.2–12.9)
POTASSIUM SERPL-SCNC: 4.2 MMOL/L (ref 3.5–5.1)
PROT SERPL-MCNC: 6.5 GM/DL (ref 6–8.4)
RBC # BLD AUTO: 3.36 M/UL (ref 4–5.4)
RELATIVE EOSINOPHIL (OHS): 0 %
RELATIVE LYMPHOCYTE (OHS): 33.7 % (ref 18–48)
RELATIVE MONOCYTE (OHS): 9.6 % (ref 4–15)
RELATIVE NEUTROPHIL (OHS): 55.5 % (ref 38–73)
SODIUM SERPL-SCNC: 139 MMOL/L (ref 136–145)
WBC # BLD AUTO: 2.61 K/UL (ref 3.9–12.7)

## 2025-06-02 PROCEDURE — 99215 OFFICE O/P EST HI 40 MIN: CPT | Mod: PBBFAC,PN | Performed by: NURSE PRACTITIONER

## 2025-06-02 PROCEDURE — 96417 CHEMO IV INFUS EACH ADDL SEQ: CPT | Mod: PN

## 2025-06-02 PROCEDURE — 96375 TX/PRO/DX INJ NEW DRUG ADDON: CPT | Mod: PN

## 2025-06-02 PROCEDURE — 99999 PR PBB SHADOW E&M-EST. PATIENT-LVL V: CPT | Mod: PBBFAC,,, | Performed by: NURSE PRACTITIONER

## 2025-06-02 PROCEDURE — 77386 HC IMRT, COMPLEX: CPT | Mod: PN | Performed by: RADIOLOGY

## 2025-06-02 PROCEDURE — G2211 COMPLEX E/M VISIT ADD ON: HCPCS | Mod: S$PBB,,, | Performed by: NURSE PRACTITIONER

## 2025-06-02 PROCEDURE — 77014 PR  CT GUIDANCE PLACEMENT RAD THERAPY FIELDS: CPT | Mod: 26,,, | Performed by: RADIOLOGY

## 2025-06-02 PROCEDURE — 99214 OFFICE O/P EST MOD 30 MIN: CPT | Mod: S$PBB,,, | Performed by: NURSE PRACTITIONER

## 2025-06-02 PROCEDURE — 96367 TX/PROPH/DG ADDL SEQ IV INF: CPT | Mod: PN

## 2025-06-02 PROCEDURE — A4216 STERILE WATER/SALINE, 10 ML: HCPCS | Mod: PN | Performed by: NURSE PRACTITIONER

## 2025-06-02 PROCEDURE — 25000003 PHARM REV CODE 250: Mod: PN | Performed by: NURSE PRACTITIONER

## 2025-06-02 PROCEDURE — 82040 ASSAY OF SERUM ALBUMIN: CPT | Mod: PN

## 2025-06-02 PROCEDURE — 85025 COMPLETE CBC W/AUTO DIFF WBC: CPT | Mod: PN

## 2025-06-02 PROCEDURE — 36415 COLL VENOUS BLD VENIPUNCTURE: CPT | Mod: PN

## 2025-06-02 PROCEDURE — 63600175 PHARM REV CODE 636 W HCPCS: Mod: PN | Performed by: NURSE PRACTITIONER

## 2025-06-02 PROCEDURE — 96413 CHEMO IV INFUSION 1 HR: CPT | Mod: PN

## 2025-06-02 RX ORDER — SODIUM CHLORIDE 0.9 % (FLUSH) 0.9 %
10 SYRINGE (ML) INJECTION
Status: CANCELLED | OUTPATIENT
Start: 2025-06-02

## 2025-06-02 RX ORDER — FAMOTIDINE 10 MG/ML
20 INJECTION, SOLUTION INTRAVENOUS
Status: CANCELLED | OUTPATIENT
Start: 2025-06-02

## 2025-06-02 RX ORDER — HEPARIN 100 UNIT/ML
500 SYRINGE INTRAVENOUS
Status: DISCONTINUED | OUTPATIENT
Start: 2025-06-02 | End: 2025-06-02 | Stop reason: HOSPADM

## 2025-06-02 RX ORDER — DIPHENHYDRAMINE HYDROCHLORIDE 50 MG/ML
50 INJECTION, SOLUTION INTRAMUSCULAR; INTRAVENOUS ONCE AS NEEDED
Status: DISCONTINUED | OUTPATIENT
Start: 2025-06-02 | End: 2025-06-02 | Stop reason: HOSPADM

## 2025-06-02 RX ORDER — SODIUM CHLORIDE 0.9 % (FLUSH) 0.9 %
10 SYRINGE (ML) INJECTION
Status: DISCONTINUED | OUTPATIENT
Start: 2025-06-02 | End: 2025-06-02 | Stop reason: HOSPADM

## 2025-06-02 RX ORDER — EPINEPHRINE 0.3 MG/.3ML
0.3 INJECTION SUBCUTANEOUS ONCE AS NEEDED
Status: DISCONTINUED | OUTPATIENT
Start: 2025-06-02 | End: 2025-06-02 | Stop reason: HOSPADM

## 2025-06-02 RX ORDER — DIPHENHYDRAMINE HYDROCHLORIDE 50 MG/ML
50 INJECTION, SOLUTION INTRAMUSCULAR; INTRAVENOUS ONCE AS NEEDED
Status: CANCELLED | OUTPATIENT
Start: 2025-06-02

## 2025-06-02 RX ORDER — EPINEPHRINE 0.3 MG/.3ML
0.3 INJECTION SUBCUTANEOUS ONCE AS NEEDED
Status: CANCELLED | OUTPATIENT
Start: 2025-06-02

## 2025-06-02 RX ORDER — FAMOTIDINE 10 MG/ML
20 INJECTION, SOLUTION INTRAVENOUS
Status: COMPLETED | OUTPATIENT
Start: 2025-06-02 | End: 2025-06-02

## 2025-06-02 RX ORDER — HEPARIN 100 UNIT/ML
500 SYRINGE INTRAVENOUS
Status: CANCELLED | OUTPATIENT
Start: 2025-06-02

## 2025-06-02 RX ADMIN — SODIUM CHLORIDE: 9 INJECTION, SOLUTION INTRAVENOUS at 09:06

## 2025-06-02 RX ADMIN — Medication 10 ML: at 01:06

## 2025-06-02 RX ADMIN — PACLITAXEL 90 MG: 6 INJECTION, SOLUTION INTRAVENOUS at 11:06

## 2025-06-02 RX ADMIN — DEXAMETHASONE SODIUM PHOSPHATE 0.25 MG: 10 INJECTION, SOLUTION INTRAMUSCULAR; INTRAVENOUS at 10:06

## 2025-06-02 RX ADMIN — FAMOTIDINE 20 MG: 10 INJECTION INTRAVENOUS at 10:06

## 2025-06-02 RX ADMIN — CARBOPLATIN 220 MG: 10 INJECTION, SOLUTION INTRAVENOUS at 12:06

## 2025-06-02 RX ADMIN — DIPHENHYDRAMINE HYDROCHLORIDE 50 MG: 50 INJECTION INTRAMUSCULAR; INTRAVENOUS at 10:06

## 2025-06-03 ENCOUNTER — OUTPATIENT CASE MANAGEMENT (OUTPATIENT)
Dept: ADMINISTRATIVE | Facility: OTHER | Age: 70
End: 2025-06-03
Payer: MEDICARE

## 2025-06-03 PROCEDURE — 77014 PR  CT GUIDANCE PLACEMENT RAD THERAPY FIELDS: CPT | Mod: 26,,, | Performed by: RADIOLOGY

## 2025-06-03 PROCEDURE — 77386 HC IMRT, COMPLEX: CPT | Mod: PN | Performed by: RADIOLOGY

## 2025-06-03 PROCEDURE — 77336 RADIATION PHYSICS CONSULT: CPT | Mod: PN | Performed by: RADIOLOGY

## 2025-06-04 ENCOUNTER — CLINICAL SUPPORT (OUTPATIENT)
Dept: REHABILITATION | Facility: HOSPITAL | Age: 70
End: 2025-06-04
Payer: MEDICARE

## 2025-06-04 ENCOUNTER — OUTPATIENT CASE MANAGEMENT (OUTPATIENT)
Dept: ADMINISTRATIVE | Facility: OTHER | Age: 70
End: 2025-06-04
Payer: MEDICARE

## 2025-06-04 ENCOUNTER — OFFICE VISIT (OUTPATIENT)
Dept: PSYCHIATRY | Facility: CLINIC | Age: 70
End: 2025-06-04
Payer: MEDICARE

## 2025-06-04 ENCOUNTER — DOCUMENTATION ONLY (OUTPATIENT)
Dept: INFUSION THERAPY | Facility: HOSPITAL | Age: 70
End: 2025-06-04
Payer: MEDICARE

## 2025-06-04 ENCOUNTER — DOCUMENTATION ONLY (OUTPATIENT)
Dept: HEMATOLOGY/ONCOLOGY | Facility: CLINIC | Age: 70
End: 2025-06-04
Payer: MEDICARE

## 2025-06-04 DIAGNOSIS — G62.9 PERIPHERAL POLYNEUROPATHY: ICD-10-CM

## 2025-06-04 DIAGNOSIS — R53.83 CHEMOTHERAPY-INDUCED FATIGUE: ICD-10-CM

## 2025-06-04 DIAGNOSIS — T45.1X5A CHEMOTHERAPY-INDUCED FATIGUE: ICD-10-CM

## 2025-06-04 DIAGNOSIS — C34.90 MALIGNANT NEOPLASM OF UNSPECIFIED PART OF UNSPECIFIED BRONCHUS OR LUNG: ICD-10-CM

## 2025-06-04 DIAGNOSIS — F43.22 ADJUSTMENT DISORDER WITH ANXIOUS MOOD: Primary | ICD-10-CM

## 2025-06-04 DIAGNOSIS — M54.59 OTHER LOW BACK PAIN: ICD-10-CM

## 2025-06-04 DIAGNOSIS — C34.90 MALIGNANT NEOPLASM OF UNSPECIFIED PART OF UNSPECIFIED BRONCHUS OR LUNG: Primary | ICD-10-CM

## 2025-06-04 PROCEDURE — 77386 HC IMRT, COMPLEX: CPT | Mod: PN | Performed by: RADIOLOGY

## 2025-06-04 PROCEDURE — 77014 PR  CT GUIDANCE PLACEMENT RAD THERAPY FIELDS: CPT | Mod: 26,,, | Performed by: RADIOLOGY

## 2025-06-04 PROCEDURE — 97163 PT EVAL HIGH COMPLEX 45 MIN: CPT | Mod: PN

## 2025-06-08 NOTE — PROGRESS NOTES
PATIENT: Alessia Nelson  MRN: [id number]  DATE: 6/9/2025      Diagnosis:   1. Malignant neoplasm of unspecified part of unspecified bronchus or lung    2. Cancer related pain    3. Other specified disorders involving the immune mechanism, not elsewhere classified    4. Immunodeficiency due to chemotherapy    5. Arachnoid membrane inflammation    6. Hypertension, unspecified type    7. MEN (multiple endocrine neoplasia)    8. Hypothyroidism, unspecified type    9. Pancreatic insufficiency    10. Antineoplastic chemotherapy induced pancytopenia                  Chief Complaint: Lung Cancer      Oncologic History:      Oncologic History     Oncologic Treatment     Pathology           Subjective:    Interval History:  Ms. Nelson is a 69 y.o. female with CAD, CHF, COPD, depression, GERD, HTN, HLD, Seizures, h/o CVA, thyroid disease who presents for NSCLC.  Since the last clinic visit the patient endorses unsteadiness with her gait after coming home from last cycle of chemo.  The patient endorses continued intermittent numbness in her hands.  The patient denies CP, cough, SOB, abdominal pain, nausea, vomiting, constipation, diarrhea.  The patient denies fever, chills, night sweats, weight loss, new lumps or bumps, easy bruising or bleeding.    Prior History:  The patient was being followed for a nodule in the right lower lobe and had PET-CT on 05/22/2024 which showed no avidity.  CT chest on 11/11/2024 showed enlargement of the ground-glass right lower lobe lung nodule measuring 18 x 19 mm and small bilateral soft tissue pulmonary nodules measuring 3-8 mm.  The patient underwent robotic bronchoscopy and EBUS on 02/20/2025 with pathology showing squamous cell carcinoma and biopsy of right lower lung nodule and a positive station 7 lymph node.  Stations 4R, 11R S and 11 RI were negative.  TEMPUS tissue testing showed mutations in CDKN2A, FAT1, and TP53.  PD-L1 was <1%.  The patient underwent CT myelogram and MRI of the  thoracic spine 03/13/2025 for chronic upper back pain with radiation down bilateral arms.   CT thoracic spine on 03/13/2025 report obtained showing stable expansile mass of the superior thoracic cord extending from T2 through T4 measuring 55 mm in length by 10 mm medial lateral and 9 mm AP.  MRI T-spine on 03/13/2025 showed expansile mass in the thoracic cord concerning for potential ependymoma versus astrocytoma.   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  Dr Staples 4/15/25 felt the patient needed fenestration with neurosurgery.     Past Medical History:   Past Medical History:   Diagnosis Date    Acute pancreatitis     Anticoagulant long-term use     Back pain     CAD (coronary artery disease)     CHF (congestive heart failure)     COPD (chronic obstructive pulmonary disease)     Depression     Diverticulosis     Encounter for blood transfusion     GERD (gastroesophageal reflux disease)     History of blood clots     1986 AFTER BOWEL RESCETION    History of bowel resection     Hyperlipidemia     Hypertension     Malignant neoplasm of unspecified part of unspecified bronchus or lung     Peritonitis     PONV (postoperative nausea and vomiting)     Pulmonary nodule     Seizures     HAD A SEIZURE FROM PHENERGAN     Stroke     left sided weakness    Thyroid disease     TIA (transient ischemic attack)     Wears dentures     upper       Past Surgical HIstory:   Past Surgical History:   Procedure Laterality Date    ADRENAL GLAND SURGERY      APPENDECTOMY      BACK SURGERY      CAUDAL EPIDURAL STEROID INJECTION N/A 03/01/2021    Procedure: Injection-steroid-epidural-caudal;  Surgeon: Socorro Lorenz MD;  Location: [facility] OR;  Service: Pain Management;  Laterality: N/A;    CHOLECYSTECTOMY      COLONOSCOPY      COLONOSCOPY N/A 06/10/2021     Procedure: COLONOSCOPY;  Surgeon: Sheree Metz MD;  Location: [facility] ENDO;  Service: Endoscopy;  Laterality: N/A;    COLONOSCOPY N/A 03/08/2022    Procedure: COLONOSCOPY;  Surgeon: Maurilio Rodriguez MD;  Location: [facility] (2ND FLR);  Service: Endoscopy;  Laterality: N/A;  Pt to perform at-home COVID test morning of procedure-DS  [facility]see letters tab 2/24/22[facility]DS  3/2-Instructions sent via email-DS  3/7-Pt unable to come earlier due to ride-DS    CORONARY STENT PLACEMENT      CYSTOURETHROSCOPY N/A 11/13/2019    Procedure: CYSTOURETHROSCOPY;  Surgeon: Shun Nuñez MD;  Location: [facility] OR;  Service: Urology;  Laterality: N/A;    DIRECT DIAGNOSTIC LARYNGOSCOPY WITH BRONCHOSCOPY AND ESOPHAGOSCOPY N/A 01/03/2024    Procedure: LARYNGOSCOPY, DIRECT, DIAGNOSTIC, WITH BRONCHOSCOPY AND ESOPHAGOSCOPY;  Surgeon: Mir Belle MD;  Location: [facility] OR;  Service: ENT;  Laterality: N/A;    ENDOBRONCHIAL ULTRASOUND Bilateral 2/20/2025    Procedure: ENDOBRONCHIAL ULTRASOUND (EBUS);  Surgeon: Ismael Cooper MD;  Location: [facility] OR;  Service: Pulmonary;  Laterality: Bilateral;    ENDOSCOPIC ULTRASOUND OF LOWER GASTROINTESTINAL TRACT N/A 03/08/2022    Procedure: ULTRASOUND, LOWER GI TRACT, ENDOSCOPIC;  Surgeon: Maurilio Rodriguez MD;  Location: [facility] (2ND FLR);  Service: Endoscopy;  Laterality: N/A;  Pt to perform at-home COVID test morning of procedure-DS  2[facility]24-[facility] (see letters tab 2/24/22)-DS  3/2-Instructions sent via email-DS  3/7-Pt unable to come earlier due to ride-DS    ENDOSCOPIC ULTRASOUND OF UPPER GASTROINTESTINAL TRACT N/A 11/25/2019    Procedure: ULTRASOUND, UPPER GI TRACT, ENDOSCOPIC;  Surgeon: Alhaji Bridges MD;  Location: [facility] (2ND FLR);  Service: Endoscopy;  Laterality: N/A;  5 day hold Plavix, Dr Jovanni Serrano - pg  PM prep    ENDOSCOPIC ULTRASOUND OF UPPER GASTROINTESTINAL TRACT N/A 11/02/2020    Procedure: ULTRASOUND,  UPPER GI TRACT, ENDOSCOPIC;  Surgeon: Maurilio Rodriguez MD;  Location: [facility] ENDO (2ND FLR);  Service: Endoscopy;  Laterality: N/A;  5 day hold Plavix, Dr Roman Walsh - pg  Covid-19 test 10/30/20 at [facility]    EPIDURAL STEROID INJECTION INTO CERVICAL SPINE N/A 12/08/2021    Procedure: Injection-steroid-epidural-cervical;  Surgeon: Socorro Lorenz MD;  Location: [facility] OR;  Service: Pain Management;  Laterality: N/A;    EPIDURAL STEROID INJECTION INTO CERVICAL SPINE N/A 11/30/2023    Procedure: Injection-steroid-epidural-cervical;  Surgeon: Maurilio Carroll MD;  Location: [facility] OR;  Service: Anesthesiology;  Laterality: N/A;  c7-T1    ESOPHAGOGASTRODUODENOSCOPY N/A 06/10/2021    Procedure: EGD (ESOPHAGOGASTRODUODENOSCOPY);  Surgeon: Sheree Metz MD;  Location: [facility] ENDO;  Service: Endoscopy;  Laterality: N/A;    ESOPHAGOGASTRODUODENOSCOPY N/A 09/26/2024    Procedure: EGD (ESOPHAGOGASTRODUODENOSCOPY);  Surgeon: Jovanny Saldivar MD;  Location: [facility];  Service: Endoscopy;  Laterality: N/A;    ESOPHAGOSCOPY, USING BOUGIE, WITH DILATION N/A 01/03/2024    Procedure: ESOPHAGOSCOPY, USING BOUGIE, WITH DILATION;  Surgeon: Mir Belle MD;  Location: [facility] OR;  Service: ENT;  Laterality: N/A;    FRACTURE SURGERY Left 05/02/2022    ankle    HERNIA REPAIR      HYSTERECTOMY      INCISIONAL HERNIA REPAIR      INJECTION OF ANESTHETIC AGENT AROUND NERVE Right 05/27/2019    Procedure: RIGHT L5-S3 MEDIAL BRANCH BLOCKS;  Surgeon: Sneha Aragon MD;  Location: [facility] OR;  Service: Pain Management;  Laterality: Right;  **DO NOT STOP PLAVIX**    INJECTION OF JOINT Right 09/22/2021    Procedure: Injection, Joint; Right SI Joint Injection;  Surgeon: Socorro Lorenz MD;  Location: [facility] OR;  Service: Pain Management;  Laterality: Right;  Oral    INSERTION OF DORSAL COLUMN NERVE STIMULATOR FOR TRIAL N/A 06/07/2021    Procedure: INSERTION, NEUROSTIMULATOR, SPINAL CORD, DORSAL COLUMN, FOR TRIAL;  Surgeon: Socorro Lorenz MD;   Location: [facility] OR;  Service: Pain Management;  Laterality: N/A;  nevro rep confirmed start time    INSERTION OF TUNNELED CENTRAL VENOUS CATHETER (CVC) WITH SUBCUTANEOUS PORT N/A 5/7/2025    Procedure: [id number];  Surgeon: Janet Jarrett MD;  Location: [facility] OR;  Service: General;  Laterality: N/A;    instestine      bowel section    KNEE ARTHROPLASTY Right 12/18/2019    Procedure: ARTHROPLASTY, KNEE;  Surgeon: Ike Briceño II, MD;  Location: [facility] OR;  Service: Orthopedics;  Laterality: Right;    KNEE SURGERY  1983    OOPHORECTOMY      PARATHYROID GLAND SURGERY      3 surgeries    RADIOFREQUENCY ABLATION Right 06/10/2019    Procedure: Radiofrequency Ablation - RIGHT L3-5 RADIOFREQUENCY ABLATION WITH SoundFocusYARD COOLIEF THERMAL SYSTEM;  Surgeon: Sneha Aragon MD;  Location: [facility] OR;  Service: Pain Management;  Laterality: Right;  **HOLD PLAVIX x 7 DAYS PRIOR**    REVISION OF KNEE ARTHROPLASTY Right     AVALA with Dr Singh    ROBOTIC BRONCHOSCOPY Bilateral 2/20/2025    Procedure: ROBOTIC BRONCHOSCOPY;  Surgeon: Ismael Cooper MD;  Location: [facility] OR;  Service: Pulmonary;  Laterality: Bilateral;    SPINAL CORD STIMULATOR REMOVAL N/A 02/21/2024    Procedure: REMOVAL, NEUROSTIMULATOR, SPINAL;  Surgeon: Maurilio Carroll MD;  Location: [facility] OR;  Service: Pain Management;  Laterality: N/A;  removal of spinal cord stimulator    UPPER GASTROINTESTINAL ENDOSCOPY         Family History:   Family History   Problem Relation Name Age of Onset    Stroke Maternal Grandmother      Cancer Maternal Grandfather      Stroke Mother      Heart disease Father      Breast cancer Sister         Social History:  reports that she quit smoking about 2 years ago. Her smoking use included cigarettes. She started smoking about 48 years ago. She has a 46 pack-year smoking history. She has never used smokeless tobacco. She reports that she does not currently use alcohol. She reports current drug use.    Allergies:  Review of patient's  "allergies indicates:   Allergen Reactions    Aspirin Other (See Comments)     "Makes stomach feel like it's on fire"    Phenergan [promethazine] Other (See Comments)     SEIZURES    Nickel     Lortab [hydrocodone-acetaminophen] Itching     Able to take generic Norco       Medications:  Current Medications[1]    Review of Systems   Constitutional:  Positive for fatigue. Negative for chills, fever and unexpected weight change.   HENT:  Positive for mouth sores.    Respiratory:  Negative for cough and shortness of breath.    Cardiovascular:  Negative for chest pain and palpitations.   Gastrointestinal:  Positive for diarrhea. Negative for abdominal pain, constipation, nausea and vomiting.   Skin:  Negative for rash.   Neurological:  Negative for headaches.   Hematological:  Negative for adenopathy. Does not bruise/bleed easily.       ECOG Performance Status: 2   Objective:      Vitals:   Vitals:    06/09/25 0930   BP: (!) 151/73   BP Location: Left arm   Patient Position: Sitting   Pulse: 72   Resp: 16   Temp: 98.1 °F (36.7 °C)   TempSrc: Temporal   SpO2: 98%   Weight: 89.3 kg (196 lb 13.9 oz)   Height: 5' 7" (1.702 m)               Physical Exam  Constitutional:       General: She is not in acute distress.     Appearance: She is well-developed. She is not diaphoretic.   HENT:      Head: Normocephalic and atraumatic.   Cardiovascular:      Rate and Rhythm: Normal rate and regular rhythm.      Heart sounds: Normal heart sounds. No murmur heard.     No friction rub. No gallop.   Pulmonary:      Effort: Pulmonary effort is normal. No respiratory distress.      Breath sounds: Normal breath sounds. No wheezing or rales.   Chest:      Chest wall: No tenderness.   Abdominal:      General: Bowel sounds are normal. There is no distension.      Palpations: Abdomen is soft. There is no mass.      Tenderness: There is no abdominal tenderness. There is no guarding or rebound.   Lymphadenopathy:      Cervical: No cervical " adenopathy.      Upper Body:      Right upper body: No supraclavicular or axillary adenopathy.      Left upper body: No supraclavicular or axillary adenopathy.   Skin:     Findings: No erythema or rash.   Neurological:      Mental Status: She is alert and oriented to person, place, and time.   Psychiatric:         Behavior: Behavior normal.         Laboratory Data:  Lab Visit on 06/09/2025   Component Date Value Ref Range Status    Sodium 06/09/2025 141  136 - 145 mmol/L Final    Potassium 06/09/2025 4.4  3.5 - 5.1 mmol/L Final    Chloride 06/09/2025 107  95 - 110 mmol/L Final    CO2 06/09/2025 22 (L)  23 - 29 mmol/L Final    Glucose 06/09/2025 118 (H)  70 - 110 mg/dL Final    BUN 06/09/2025 18  8 - 23 mg/dL Final    Creatinine 06/09/2025 0.9  0.5 - 1.4 mg/dL Final    Calcium 06/09/2025 10.3  8.7 - 10.5 mg/dL Final    Protein Total 06/09/2025 6.5  6.0 - 8.4 gm/dL Final    Albumin 06/09/2025 3.6  3.5 - 5.2 g/dL Final    Bilirubin Total 06/09/2025 0.2  0.1 - 1.0 mg/dL Final    For infants and newborns, interpretation of results should be based   on gestational age, weight and in agreement with clinical   observations.    Premature Infant recommended reference ranges:   0-24 hours:  <8.0 mg/dL   24-48 hours: <12.0 mg/dL   3-5 days:    <15.0 mg/dL   6-29 days:   <15.0 mg/dL    ALP 06/09/2025 80  40 - 150 unit/L Final    AST 06/09/2025 15  11 - 45 unit/L Final    ALT 06/09/2025 17  10 - 44 unit/L Final    Anion Gap 06/09/2025 12  8 - 16 mmol/L Final    eGFR 06/09/2025 >60  >60 mL/min/1.73/m2 Final    Estimated GFR calculated using the CKD-EPI creatinine (2021) equation.    WBC 06/09/2025 3.48 (L)  3.90 - 12.70 K/uL Final    RBC 06/09/2025 3.47 (L)  4.00 - 5.40 M/uL Final    HGB 06/09/2025 10.3 (L)  12.0 - 16.0 gm/dL Final    HCT 06/09/2025 32.7 (L)  37.0 - 48.5 % Final    MCV 06/09/2025 94  82 - 98 fL Final    MCH 06/09/2025 29.7  27.0 - 31.0 pg Final    MCHC 06/09/2025 31.5 (L)  32.0 - 36.0 g/dL Final    RDW 06/09/2025  17.3 (H)  11.5 - 14.5 % Final    Platelet Count 06/09/2025 147 (L)  150 - 450 K/uL Final    MPV 06/09/2025 8.7 (L)  9.2 - 12.9 fL Final    Nucleated RBC 06/09/2025 0  <=0 /100 WBC Final    Neut % 06/09/2025 71.2  38 - 73 % Final    Lymph % 06/09/2025 19.0  18 - 48 % Final    Mono % 06/09/2025 7.2  4 - 15 % Final    Eos % 06/09/2025 0.6  <=8 % Final    Basophil % 06/09/2025 1.1  <=1.9 % Final    Imm Grans % 06/09/2025 0.9 (H)  0.0 - 0.5 % Final    Neut # 06/09/2025 2.48  1.8 - 7.7 K/uL Final    Lymph # 06/09/2025 0.66 (L)  1 - 4.8 K/uL Final    Mono # 06/09/2025 0.25 (L)  0.3 - 1 K/uL Final    Eos # 06/09/2025 0.02  <=0.5 K/uL Final    Baso # 06/09/2025 0.04  <=0.2 K/uL Final    Imm Grans # 06/09/2025 0.03  0.00 - 0.04 K/uL Final    Mild elevation in immature granulocytes is non specific and can be seen in a variety of conditions including stress response, acute inflammation, trauma and pregnancy. Correlation with other laboratory and clinical findings is essential.         Imaging:     MRI Brain 3/07/25  Gray matter distinction is preserved throughout the brain parenchyma. The ventricles are midline without mass effect. There is mild periventricular and deep white matter FLAIR and T2 hyperintensities likely reflecting changes of chronic microvascular ischemia. No intra-axial hemorrhage or restricted diffusion. No intra-axial fluid collection or mass. Bone marrow signal of the calvarium is within normal limits. Major vascular flow voids are within normal limits. The orbits globes and optic nerves are grossly unremarkable. Paranasal sinuses are unremarkable.    PET/CT 3/10/25  Quality of the study: Adequate.     There is increased FDG avidity within a posterior right upper lobe pulmonary nodule measuring 2.0 cm on transmission CT images. This shows max SUV of 3.6 on today's exam versus 1.8 on prior exam.     No other abnormal regions of FDG uptake are identified.     Physiologic uptake of the tracer is present within  the brain, salivary glands, myocardium, GI and  tracts.     Incidental CT findings: N/A       Assessment:       1. Malignant neoplasm of unspecified part of unspecified bronchus or lung    2. Cancer related pain    3. Other specified disorders involving the immune mechanism, not elsewhere classified    4. Immunodeficiency due to chemotherapy    5. Arachnoid membrane inflammation    6. Hypertension, unspecified type    7. MEN (multiple endocrine neoplasia)    8. Hypothyroidism, unspecified type    9. Pancreatic insufficiency    10. Antineoplastic chemotherapy induced pancytopenia                     Plan:     NSCLC - Pt with stage IIIa pT1cN2 NSCLC SCC of the RLL with met to station 7 LN  -TEMPUS tissue testing showed mutations in CDKN2A, FAT1, and TP53.  PD-L1 was <1%  -MRI brain KRISHNA on 3/07/25  -Plan for Carboplatin and Paclitaxel with XRT followed by year treatment with Durvalumab  -Will proceed with cycle 5 of chemo  -Plan to proceed with 6 cycles total  -Radiation to complete 6/25/25  -Will repeat CT chest in 4 weeks prior to proceeding with immunotherapy  Visit today included increased complexity associated with the care of the episodic problem (chemotherapy) addressed and managing the longitudinal care of the patient due to the serious and/or complex managed problem(s) NSCLC.    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  -Dr Staples  4/15/25 felt the patient needed fenestration with neurosurgery  -Pt following with Dr rFeddy Burch    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  -Pt to see genetecist    HTN - pt on amlodipine, hydralazine, metoprolol  -BP controlled  -Will monitor    Hypothyroidism - pt on levothyroxine 75mcg daily  -WIll monitor    Pancreatic Insufficiency - due to prior chronic pancreatitis   -Patient on Zenpep   -Management per GI    Pancytopenia - Due to radiation and chemo  Lab Results   Component Value Date    WBC 3.48 (L) 06/09/2025    HGB 10.3 (L) 06/09/2025    HCT 32.7 (L) 06/09/2025    MCV 94 06/09/2025     (L) 06/09/2025   -Will monitor    Unsteady Gait - intermittent just after last chemo treatment  -Will monitor    Route Chart for Scheduling    Med Onc Chart Routing      Follow up with physician 4 weeks. Pt can proceed with treatment.  Pt needs a CBC and CMP with appt with NP.  Pt needs appt with NP in 2 weeks with labs CBC adn CMP.  Pt needs a CT chest in 4 weeks with CBC, CMP and TSH with appt with me.   Follow up with [facility] 1 week and 2 weeks.   Infusion scheduling note    Injection scheduling note    Labs    Imaging    Pharmacy appointment    Other referrals              Treatment Plan Information   OP NSCLC PACLitaxel CARBOplatin QW + radiation Sea William MD   Associated diagnosis: Cancer of lower lobe of right lung  cT1c, cN2(f), cM0 noted on 3/14/2025   Line of treatment: Adjuvant  Treatment Goal: Curative     Upcoming Treatment Dates - OP NSCLC PACLitaxel CARBOplatin QW + radiation    6/16/2025       Pre-Medications       palonosetron 0.25mg/dexAMETHasone 12mg in NS IVPB 0.25 mg 50 mL       diphenhydrAMINE (BENADRYL) 50 mg in NS 50 mL IVPB       famotidine (PF) injection 20 mg       Chemotherapy       PACLitaxeL (TAXOL) in 0.9% NaCl 250 mL chemo infusion       CARBOplatin (PARAPLATIN) 220 mg in 0.9% NaCl 272 mL chemo infusion  6/23/2025       Pre-Medications       palonosetron 0.25mg/dexAMETHasone  12mg in NS IVPB 0.25 mg 50 mL       diphenhydrAMINE (BENADRYL) 50 mg in NS 50 mL IVPB       famotidine (PF) injection 20 mg       Chemotherapy       PACLitaxeL (TAXOL) in 0.9% NaCl 250 mL chemo infusion       CARBOplatin (PARAPLATIN) 220 mg in 0.9% NaCl 272 mL chemo infusion    Supportive Plan Information  IV FLUIDS AND ELECTROLYTES Sea William MD   Associated Diagnosis: Cancer related pain   noted on 6/9/2025   Line of treatment: Supportive Care   Treatment goal: Supportive     Upcoming Treatment Dates - IV FLUIDS AND ELECTROLYTES    No upcoming days in selected categories.      Sea William MD  [facility]  Hematology and Oncology  [facility]   [address]   [place] [address]   O: [phone]  F: [phone]                       [1]   Current Outpatient Medications   Medication Sig Dispense Refill    acetaminophen (TYLENOL) 325 MG tablet Take 2 tablets (650 mg total) by mouth every 6 (six) hours as needed for Pain (Do not take with any other tylenol containing products).  0    allopurinoL (ZYLOPRIM) 300 MG tablet TAKE 1 TABLET DAILY 90 tablet 1    amLODIPine (NORVASC) 5 MG tablet TAKE 1 TABLET(5 MG) BY MOUTH TWICE DAILY 180 tablet 1    busPIRone (BUSPAR) 5 MG Tab Take 1 tablet (5 mg total) by mouth 2 (two) times daily. 180 tablet 3    calcium carbonate (CALCIUM 500) 500 mg calcium (1,250 mg) chewable tablet Take 1 tablet by mouth once daily.      clopidogreL (PLAVIX) 75 mg tablet Take 1 tablet (75 mg total) by mouth once daily. 90 tablet 3    colestipoL (COLESTID) 1 gram Tab TAKE 2 TABLETS ONCE DAILY 180 tablet 1    cyanocobalamin 1,000 mcg/mL injection INJECT 1 ML IN THE MUSCLE EVERY 14 DAYS 10 mL 0    diphenhydrAMINE-aluminum-magnesium hydroxide-simethicone-LIDOcaine viscous HCl 2% Swish and spit 15 mLs every 4 (four) hours as needed. 500 each 3    duke's soln (benadryl 30 mL, mylanta 30 mL, LIDOcaine 30 mL, nystatin 30 mL) 120mL Take 10 mLs by mouth 4 (four)  times daily. 120 mL 0    EScitalopram oxalate (LEXAPRO) 20 MG tablet TAKE 1 TABLET EVERY EVENING 90 tablet 3    fluticasone propionate (FLONASE) 50 mcg/actuation nasal spray 2 sprays (100 mcg total) by Each Nostril route 2 (two) times daily. 48 g 3    furosemide (LASIX) 20 MG tablet TAKE 1 TABLET(20 MG) BY MOUTH EVERY DAY 90 tablet 3    gabapentin (NEURONTIN) 400 MG capsule Take 1 capsule (400 mg total) by mouth 2 (two) times daily. 180 capsule 3    hydrALAZINE (APRESOLINE) 25 MG tablet Take 1 tablet (25 mg total) by mouth daily as needed (Check BP after taking amlodipine, Lasix, metoprolol.  If it continues to stay greater than 160/100 take hydralazine). 30 tablet 0    levothyroxine (SYNTHROID) 75 MCG tablet TAKE 1 TABLET DAILY 90 tablet 1    LIDOcaine-prilocaine (EMLA) cream Apply topically as needed (Apply 30 minutes prior to port access). 30 g 2    magnesium oxide (MAG-OX) 400 mg (241.3 mg magnesium) tablet Take 1 tablet (400 mg total) by mouth once daily.      methocarbamoL (ROBAXIN) 750 MG Tab Take 1 tablet (750 mg total) by mouth every 8 (eight) hours as needed (muscle spasm). 270 tablet 3    metoprolol succinate (TOPROL-XL) 50 MG 24 hr tablet Take 1 tablet (50 mg total) by mouth once daily. Pt takes 1/2 tab daily 90 tablet 2    mupirocin (BACTROBAN) 2 % ointment Apply topically once daily.      OLANZapine (ZYPREXA) 5 MG tablet Take 1 tablet (5 mg total) by mouth every evening. On days 1-3 of each chemotherapy cycle. 12 tablet 1    ondansetron (ZOFRAN) 8 MG tablet Take 1 tablet (8 mg total) by mouth every 8 (eight) hours as needed for Nausea. 30 tablet 2    oxyCODONE-acetaminophen (PERCOCET)  mg per tablet Take 1 tablet by mouth every 8 (eight) hours as needed.      oxyCODONE-acetaminophen (PERCOCET) 5-325 mg per tablet Take 1 tablet by mouth every 8 (eight) hours as needed for Pain.      pantoprazole (PROTONIX) 40 MG tablet TAKE 1 TABLET DAILY 90 tablet 3    potassium chloride SA (K-DUR,KLOR-CON M) 10  MEQ tablet Take 1 tablet (10 mEq total) by mouth once daily. 90 tablet 3    rosuvastatin (CRESTOR) 10 MG tablet TAKE 1 TABLET DAILY 90 tablet 3    sucralfate (CARAFATE) 1 gram tablet Take 1 tablet (1 g total) by mouth 4 (four) times daily before meals and nightly. 120 tablet 3    traMADoL (ULTRAM) 50 mg tablet Take 1 tablet (50 mg total) by mouth every 6 (six) hours as needed for Pain. 10 tablet 0    traZODone (DESYREL) 100 MG tablet TAKE 1 TABLET EVERY EVENING 90 tablet 3    triamcinolone acetonide 0.1% (KENALOG) 0.1 % ointment Apply topically 2 (two) times daily.      vit A/vit C/vit E/zinc/copper (PRESERVISION AREDS ORAL) Take 1 capsule by mouth 2 (two) times a day.      vitamin D (VITAMIN D3) 1000 units Tab Take 1,000 Units by mouth 2 (two) times a day.       No current facility-administered medications for this visit.     Facility-Administered Medications Ordered in Other Visits   Medication Dose Route Frequency Provider Last Rate Last Admin    0.9% NaCl 250 mL flush bag   Intravenous PRN Sea William MD 25 mL/hr at 06/09/25 1042 New Bag at 06/09/25 1042    alteplase injection 2 mg  2 mg Intra-Catheter PRSea Humphreys MD        alteplase injection 2 mg  2 mg Intra-Catheter PRN Sea William MD        CARBOplatin (PARAPLATIN) 220 mg in 0.9% NaCl 307 mL chemo infusion  220 mg Intravenous 1 time in Clinic/HOD Sea William MD        diphenhydrAMINE injection 50 mg  50 mg Intravenous Once PRN Sea William MD        EPINEPHrine (EPIPEN) 0.3 mg/0.3 mL pen injection 0.3 mg  0.3 mg Intramuscular Once PRN Sea William MD        heparin, porcine (PF) 100 unit/mL injection flush 500 Units  500 Units Intravenous PRN Sea William MD        heparin, porcine (PF) 100 unit/mL injection flush 500 Units  500 Units Intravenous PRN Sea William MD        hydrocortisone sodium succinate injection 100 mg  100 mg Intravenous Once PRN Sea William MD        oxyCODONE immediate release tablet  5 mg  5 mg Oral Once PRN Paul Nunez MD        sodium chloride 0.9% flush 10 mL  10 mL Intravenous PRN Sea William MD        sodium chloride 0.9% flush 10 mL  10 mL Intravenous PRN Sea William MD

## 2025-06-09 ENCOUNTER — DOCUMENTATION ONLY (OUTPATIENT)
Dept: INFUSION THERAPY | Facility: HOSPITAL | Age: 70
End: 2025-06-09

## 2025-06-09 ENCOUNTER — LAB VISIT (OUTPATIENT)
Dept: LAB | Facility: HOSPITAL | Age: 70
End: 2025-06-09
Attending: INTERNAL MEDICINE
Payer: MEDICARE

## 2025-06-09 ENCOUNTER — INFUSION (OUTPATIENT)
Dept: INFUSION THERAPY | Facility: HOSPITAL | Age: 70
End: 2025-06-09
Attending: INTERNAL MEDICINE
Payer: MEDICARE

## 2025-06-09 ENCOUNTER — OFFICE VISIT (OUTPATIENT)
Dept: HEMATOLOGY/ONCOLOGY | Facility: CLINIC | Age: 70
End: 2025-06-09
Payer: MEDICARE

## 2025-06-09 VITALS
OXYGEN SATURATION: 98 % | HEIGHT: 67 IN | HEART RATE: 72 BPM | RESPIRATION RATE: 16 BRPM | WEIGHT: 196.88 LBS | SYSTOLIC BLOOD PRESSURE: 145 MMHG | TEMPERATURE: 98 F | BODY MASS INDEX: 30.9 KG/M2 | DIASTOLIC BLOOD PRESSURE: 77 MMHG

## 2025-06-09 VITALS
HEIGHT: 67 IN | DIASTOLIC BLOOD PRESSURE: 73 MMHG | RESPIRATION RATE: 16 BRPM | BODY MASS INDEX: 30.9 KG/M2 | WEIGHT: 196.88 LBS | OXYGEN SATURATION: 98 % | SYSTOLIC BLOOD PRESSURE: 151 MMHG | TEMPERATURE: 98 F | HEART RATE: 72 BPM

## 2025-06-09 DIAGNOSIS — C34.31 CANCER OF LOWER LOBE OF RIGHT LUNG: ICD-10-CM

## 2025-06-09 DIAGNOSIS — I10 HYPERTENSION, UNSPECIFIED TYPE: ICD-10-CM

## 2025-06-09 DIAGNOSIS — G89.3 CANCER RELATED PAIN: ICD-10-CM

## 2025-06-09 DIAGNOSIS — T45.1X5A IMMUNODEFICIENCY DUE TO CHEMOTHERAPY: ICD-10-CM

## 2025-06-09 DIAGNOSIS — G89.3 CANCER RELATED PAIN: Primary | ICD-10-CM

## 2025-06-09 DIAGNOSIS — T45.1X5A ANTINEOPLASTIC CHEMOTHERAPY INDUCED PANCYTOPENIA: ICD-10-CM

## 2025-06-09 DIAGNOSIS — D89.89 OTHER SPECIFIED DISORDERS INVOLVING THE IMMUNE MECHANISM, NOT ELSEWHERE CLASSIFIED: ICD-10-CM

## 2025-06-09 DIAGNOSIS — D61.810 ANTINEOPLASTIC CHEMOTHERAPY INDUCED PANCYTOPENIA: ICD-10-CM

## 2025-06-09 DIAGNOSIS — G03.9: ICD-10-CM

## 2025-06-09 DIAGNOSIS — D84.821 IMMUNODEFICIENCY DUE TO CHEMOTHERAPY: ICD-10-CM

## 2025-06-09 DIAGNOSIS — E03.9 HYPOTHYROIDISM, UNSPECIFIED TYPE: ICD-10-CM

## 2025-06-09 DIAGNOSIS — K86.89 PANCREATIC INSUFFICIENCY: ICD-10-CM

## 2025-06-09 DIAGNOSIS — E31.20 MEN (MULTIPLE ENDOCRINE NEOPLASIA): ICD-10-CM

## 2025-06-09 DIAGNOSIS — Z79.69 IMMUNODEFICIENCY DUE TO CHEMOTHERAPY: ICD-10-CM

## 2025-06-09 DIAGNOSIS — C34.90 MALIGNANT NEOPLASM OF UNSPECIFIED PART OF UNSPECIFIED BRONCHUS OR LUNG: Primary | ICD-10-CM

## 2025-06-09 LAB
ABSOLUTE EOSINOPHIL (OHS): 0.02 K/UL
ABSOLUTE MONOCYTE (OHS): 0.25 K/UL (ref 0.3–1)
ABSOLUTE NEUTROPHIL COUNT (OHS): 2.48 K/UL (ref 1.8–7.7)
ALBUMIN SERPL BCP-MCNC: 3.6 G/DL (ref 3.5–5.2)
ALP SERPL-CCNC: 80 UNIT/L (ref 40–150)
ALT SERPL W/O P-5'-P-CCNC: 17 UNIT/L (ref 10–44)
ANION GAP (OHS): 12 MMOL/L (ref 8–16)
AST SERPL-CCNC: 15 UNIT/L (ref 11–45)
BASOPHILS # BLD AUTO: 0.04 K/UL
BASOPHILS NFR BLD AUTO: 1.1 %
BILIRUB SERPL-MCNC: 0.2 MG/DL (ref 0.1–1)
BUN SERPL-MCNC: 18 MG/DL (ref 8–23)
CALCIUM SERPL-MCNC: 10.3 MG/DL (ref 8.7–10.5)
CHLORIDE SERPL-SCNC: 107 MMOL/L (ref 95–110)
CO2 SERPL-SCNC: 22 MMOL/L (ref 23–29)
CREAT SERPL-MCNC: 0.9 MG/DL (ref 0.5–1.4)
ERYTHROCYTE [DISTWIDTH] IN BLOOD BY AUTOMATED COUNT: 17.3 % (ref 11.5–14.5)
GFR SERPLBLD CREATININE-BSD FMLA CKD-EPI: >60 ML/MIN/1.73/M2
GLUCOSE SERPL-MCNC: 118 MG/DL (ref 70–110)
HCT VFR BLD AUTO: 32.7 % (ref 37–48.5)
HGB BLD-MCNC: 10.3 GM/DL (ref 12–16)
IMM GRANULOCYTES # BLD AUTO: 0.03 K/UL (ref 0–0.04)
IMM GRANULOCYTES NFR BLD AUTO: 0.9 % (ref 0–0.5)
LYMPHOCYTES # BLD AUTO: 0.66 K/UL (ref 1–4.8)
MCH RBC QN AUTO: 29.7 PG (ref 27–31)
MCHC RBC AUTO-ENTMCNC: 31.5 G/DL (ref 32–36)
MCV RBC AUTO: 94 FL (ref 82–98)
NUCLEATED RBC (/100WBC) (OHS): 0 /100 WBC
PLATELET # BLD AUTO: 147 K/UL (ref 150–450)
PMV BLD AUTO: 8.7 FL (ref 9.2–12.9)
POTASSIUM SERPL-SCNC: 4.4 MMOL/L (ref 3.5–5.1)
PROT SERPL-MCNC: 6.5 GM/DL (ref 6–8.4)
RBC # BLD AUTO: 3.47 M/UL (ref 4–5.4)
RELATIVE EOSINOPHIL (OHS): 0.6 %
RELATIVE LYMPHOCYTE (OHS): 19 % (ref 18–48)
RELATIVE MONOCYTE (OHS): 7.2 % (ref 4–15)
RELATIVE NEUTROPHIL (OHS): 71.2 % (ref 38–73)
SODIUM SERPL-SCNC: 141 MMOL/L (ref 136–145)
WBC # BLD AUTO: 3.48 K/UL (ref 3.9–12.7)

## 2025-06-09 PROCEDURE — 99999 PR PBB SHADOW E&M-EST. PATIENT-LVL V: CPT | Mod: PBBFAC,,, | Performed by: INTERNAL MEDICINE

## 2025-06-09 PROCEDURE — 25000003 PHARM REV CODE 250: Mod: PN | Performed by: INTERNAL MEDICINE

## 2025-06-09 PROCEDURE — 96375 TX/PRO/DX INJ NEW DRUG ADDON: CPT | Mod: PN

## 2025-06-09 PROCEDURE — 85025 COMPLETE CBC W/AUTO DIFF WBC: CPT | Mod: PN

## 2025-06-09 PROCEDURE — 84460 ALANINE AMINO (ALT) (SGPT): CPT | Mod: PN

## 2025-06-09 PROCEDURE — 63600175 PHARM REV CODE 636 W HCPCS: Mod: PN | Performed by: INTERNAL MEDICINE

## 2025-06-09 PROCEDURE — 96413 CHEMO IV INFUSION 1 HR: CPT | Mod: PN

## 2025-06-09 PROCEDURE — 36415 COLL VENOUS BLD VENIPUNCTURE: CPT | Mod: PN

## 2025-06-09 PROCEDURE — 99215 OFFICE O/P EST HI 40 MIN: CPT | Mod: PBBFAC,PN | Performed by: INTERNAL MEDICINE

## 2025-06-09 PROCEDURE — 99215 OFFICE O/P EST HI 40 MIN: CPT | Mod: S$PBB,,, | Performed by: INTERNAL MEDICINE

## 2025-06-09 PROCEDURE — G2211 COMPLEX E/M VISIT ADD ON: HCPCS | Mod: ,,, | Performed by: INTERNAL MEDICINE

## 2025-06-09 PROCEDURE — A4216 STERILE WATER/SALINE, 10 ML: HCPCS | Mod: PN | Performed by: INTERNAL MEDICINE

## 2025-06-09 PROCEDURE — 96367 TX/PROPH/DG ADDL SEQ IV INF: CPT | Mod: PN

## 2025-06-09 PROCEDURE — 96417 CHEMO IV INFUS EACH ADDL SEQ: CPT | Mod: PN

## 2025-06-09 RX ORDER — HEPARIN 100 UNIT/ML
500 SYRINGE INTRAVENOUS
Status: CANCELLED | OUTPATIENT
Start: 2025-06-09

## 2025-06-09 RX ORDER — SODIUM CHLORIDE 0.9 % (FLUSH) 0.9 %
10 SYRINGE (ML) INJECTION
Status: CANCELLED | OUTPATIENT
Start: 2025-06-09

## 2025-06-09 RX ORDER — HEPARIN 100 UNIT/ML
500 SYRINGE INTRAVENOUS
Status: DISCONTINUED | OUTPATIENT
Start: 2025-06-09 | End: 2025-06-09 | Stop reason: HOSPADM

## 2025-06-09 RX ORDER — DIPHENHYDRAMINE HYDROCHLORIDE 50 MG/ML
50 INJECTION, SOLUTION INTRAMUSCULAR; INTRAVENOUS ONCE AS NEEDED
Status: DISCONTINUED | OUTPATIENT
Start: 2025-06-09 | End: 2025-06-09 | Stop reason: HOSPADM

## 2025-06-09 RX ORDER — DIPHENHYDRAMINE HYDROCHLORIDE 50 MG/ML
50 INJECTION, SOLUTION INTRAMUSCULAR; INTRAVENOUS ONCE AS NEEDED
Status: CANCELLED | OUTPATIENT
Start: 2025-06-09

## 2025-06-09 RX ORDER — EPINEPHRINE 0.3 MG/.3ML
0.3 INJECTION SUBCUTANEOUS ONCE AS NEEDED
Status: CANCELLED | OUTPATIENT
Start: 2025-06-09

## 2025-06-09 RX ORDER — SODIUM CHLORIDE 0.9 % (FLUSH) 0.9 %
10 SYRINGE (ML) INJECTION
Status: DISCONTINUED | OUTPATIENT
Start: 2025-06-09 | End: 2025-06-09 | Stop reason: HOSPADM

## 2025-06-09 RX ORDER — HYDROMORPHONE HYDROCHLORIDE 2 MG/ML
0.5 INJECTION, SOLUTION INTRAMUSCULAR; INTRAVENOUS; SUBCUTANEOUS ONCE
Status: COMPLETED | OUTPATIENT
Start: 2025-06-09 | End: 2025-06-09

## 2025-06-09 RX ORDER — FAMOTIDINE 10 MG/ML
20 INJECTION, SOLUTION INTRAVENOUS
Status: COMPLETED | OUTPATIENT
Start: 2025-06-09 | End: 2025-06-09

## 2025-06-09 RX ORDER — HYDROMORPHONE HYDROCHLORIDE 2 MG/ML
0.5 INJECTION, SOLUTION INTRAMUSCULAR; INTRAVENOUS; SUBCUTANEOUS ONCE
Status: CANCELLED
Start: 2025-06-09 | End: 2025-06-09

## 2025-06-09 RX ORDER — EPINEPHRINE 0.3 MG/.3ML
0.3 INJECTION SUBCUTANEOUS ONCE AS NEEDED
Status: DISCONTINUED | OUTPATIENT
Start: 2025-06-09 | End: 2025-06-09 | Stop reason: HOSPADM

## 2025-06-09 RX ORDER — FAMOTIDINE 10 MG/ML
20 INJECTION, SOLUTION INTRAVENOUS
Status: CANCELLED | OUTPATIENT
Start: 2025-06-09

## 2025-06-09 RX ADMIN — SODIUM CHLORIDE: 9 INJECTION, SOLUTION INTRAVENOUS at 10:06

## 2025-06-09 RX ADMIN — FAMOTIDINE 20 MG: 10 INJECTION INTRAVENOUS at 10:06

## 2025-06-09 RX ADMIN — PACLITAXEL 90 MG: 6 INJECTION, SOLUTION INTRAVENOUS at 12:06

## 2025-06-09 RX ADMIN — DIPHENHYDRAMINE HYDROCHLORIDE 50 MG: 50 INJECTION INTRAMUSCULAR; INTRAVENOUS at 11:06

## 2025-06-09 RX ADMIN — CARBOPLATIN 220 MG: 10 INJECTION, SOLUTION INTRAVENOUS at 01:06

## 2025-06-09 RX ADMIN — DEXAMETHASONE SODIUM PHOSPHATE 0.25 MG: 10 INJECTION, SOLUTION INTRAMUSCULAR; INTRAVENOUS at 10:06

## 2025-06-09 RX ADMIN — Medication 10 ML: at 01:06

## 2025-06-09 RX ADMIN — HYDROMORPHONE HYDROCHLORIDE 0.5 MG: 2 INJECTION INTRAMUSCULAR; INTRAVENOUS; SUBCUTANEOUS at 10:06

## 2025-06-09 NOTE — PROGRESS NOTES
Oncology Nutrition   Chemotherapy Infusion Visit    Nutrition Follow Up   RD f/u with pt regarding Banatrol Plus provided at last encounter. She reports it has helped her diarrhea and she even has to drink it less often or it will cause constipation. RD expressed concern of mouth sore. She said that has resolved. They were on her tongue. She mentions an sore or hard bump on her top lip. 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 RD discussed oral care but she assured me her mouth is okay and not compromising diet intake. Provided oral care handout.     Wt Readings from Last 10 Encounters:   06/09/25 89.3 kg (196 lb 13.9 oz)   06/09/25 89.3 kg (196 lb 13.9 oz)   06/02/25 88.8 kg (195 lb 12.3 oz)   06/02/25 88.8 kg (195 lb 12.3 oz)   05/26/25 89 kg (196 lb 3.4 oz)   05/26/25 89 kg (196 lb 3.4 oz)   05/22/25 88.3 kg (194 lb 11.2 oz)   05/19/25 88.6 kg (195 lb 5.2 oz)   05/16/25 88.2 kg (194 lb 7.1 oz)   05/12/25 88.6 kg (195 lb 5.2 oz)     All other nutrition questions/concerns addressed as appropriate. Will continue to monitor prn throughout treatment.     Shavonne Keller, JOSEN, LDN, [facility]  06/09/2025  4:51 PM

## 2025-06-09 NOTE — PLAN OF CARE
Problem: Fatigue  Goal: Improved Activity Tolerance  Outcome: Progressing  Intervention: Promote Improved Energy  Flowsheets (Taken 6/9/2025 1112)  Fatigue Management:   activity assistance provided   fatigue-related activity identified   frequent rest breaks encouraged   paced activity encouraged  Sleep/Rest Enhancement:   natural light exposure provided   relaxation techniques promoted  Activity Management:   Ambulated -L4   Up in chair - L3  Environmental Support:   environmental consistency promoted   personal routine supported   rest periods encouraged     Problem: Adult Inpatient Plan of Care  Goal: Patient-Specific Goal (Individualized)  Outcome: Progressing  Flowsheets (Taken 6/9/2025 1112)  Individualized Care Needs: Conversation, blanket, pillow, friend at chairside  Anxieties, Fears or Concerns: None  Patient/Family-Specific Goals (Include Timeframe): No s/s of reaction from treatment     Problem: Adult Inpatient Plan of Care  Goal: Plan of Care Review  Outcome: Progressing  Flowsheets (Taken 6/9/2025 1112)  Plan of Care Reviewed With:   patient   friend   Pt tolerated Taxol/Carbo infusion well, NAD. Pt education reinforced on potential side effects, what to expect and when to call the physician. Pt given a schedule and reviewed, pt verbalized understanding. Pt ambulated out of the clinic without difficulty independently.

## 2025-06-09 NOTE — PROGRESS NOTES
SW met with patient briefly and provided a gas card. SW also provided the patient with a package of adult undergarments. No other needs were identified at this time. The patient was encouraged to reach out if any other needs arise.

## 2025-06-10 ENCOUNTER — DOCUMENTATION ONLY (OUTPATIENT)
Dept: RADIATION ONCOLOGY | Facility: CLINIC | Age: 70
End: 2025-06-10
Payer: MEDICARE

## 2025-06-10 ENCOUNTER — TELEPHONE (OUTPATIENT)
Dept: REHABILITATION | Facility: HOSPITAL | Age: 70
End: 2025-06-10
Payer: MEDICARE

## 2025-06-10 NOTE — PLAN OF CARE
Patient completed 19 of 30 outpatient radiation treatments to the lung without difficulty.  Patient states she is not feeling well.  All questions and concerns addressed by MD this visit.

## 2025-06-10 NOTE — TELEPHONE ENCOUNTER
Pt called to cancel her PT/funct apt on today. Apt cancelled as requested. Please see message below.

## 2025-06-13 ENCOUNTER — CLINICAL SUPPORT (OUTPATIENT)
Dept: REHABILITATION | Facility: HOSPITAL | Age: 70
End: 2025-06-13
Payer: MEDICARE

## 2025-06-13 ENCOUNTER — DOCUMENTATION ONLY (OUTPATIENT)
Dept: INFUSION THERAPY | Facility: HOSPITAL | Age: 70
End: 2025-06-13
Payer: MEDICARE

## 2025-06-13 DIAGNOSIS — M54.59 OTHER LOW BACK PAIN: ICD-10-CM

## 2025-06-13 DIAGNOSIS — G62.9 PERIPHERAL POLYNEUROPATHY: Primary | ICD-10-CM

## 2025-06-13 DIAGNOSIS — R53.83 CHEMOTHERAPY-INDUCED FATIGUE: ICD-10-CM

## 2025-06-13 DIAGNOSIS — T45.1X5A CHEMOTHERAPY-INDUCED FATIGUE: ICD-10-CM

## 2025-06-13 PROCEDURE — 97110 THERAPEUTIC EXERCISES: CPT | Mod: PN

## 2025-06-13 NOTE — PROGRESS NOTES
[facility] Outpatient  Physical Therapy Progress Note    Visit Date: 6/13/2025    Name: Alessia Nelson  MRN: [id number]  Therapy Diagnosis: No diagnosis found.      Physician: Sherry Stewart FNP-C    Physician Orders: PT Eval and Treat   Medical Diagnosis from Referral:   Encounter Diagnoses   Name Primary?    Peripheral polyneuropathy     Other low back pain     Chemotherapy-induced fatigue      Evaluation Date: 6/13/2025  Authorization Period Expiration: pending  Plan of Care Expiration: 6/4/2025 - 8/4/2025 pending auth  Progress Note Due: 7/4/2025  Visit # / Visits authorized: eval +1/ 16 auth pending   FOTO: 1/3    Precautions: Standard, Diabetes, CHF, Immunosuppression, and cancer, COPD  Treatment considerations: cannot lay on left side will cause her L hand to go numb.  Time In: 11:00  AM   Time Out: 12PM  Total Appointment Time (timed & untimed codes): 60 minutes  Chart review: She had an EBUS 2/2025 and was diagnosed with lung cancer. She will start cycle 3 of treatment Monday. She reports fatigue with treatment. She states she has always had trouble sleeping, but since starting treatment she has been sleeping well. She will also take a nap. She reports high anxiety. She is established with Dr. Soliz. She takes Lexapro for depression and states it is effective. She has mouth sores which makes eating difficult. 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 The steroids caused constipation, but she is using the bathroom now. Her current activity level is low, but she usually gets in her pool and walks. She has also gone to PT previously to help with her low back pain. She has neuropathy to her hands and feet with mostly numbness.        Subjective     Patient states: She had radiation prior to Pt and lives 60 miles away, so she doesn't want to overdo it.        Pain:  Current  2/10   Location:  back   Description: all kinds of pain   Objective      Alessia participated in / received the following treatment:    Therapeutic Exercise to develop strength, endurance, ROM, flexibility, and core stabilization for 55 minutes including:  Nustep 10 min L4    Seated ther ex  LAQ x 10  Hip ADD with ball x10  Hip ABD RTB x10  HSC RTB x10  4# dowel biceps curls x10  4# dowel overhead press  Tricep extensions RTB 10x    Standing at barre  HR x10  Mini squats x10  Hip ABD x10 each side  Hot seat attempted, but painful so stopped this date.      Neuromuscular Re-education to develop Coordination, Proprioception, and Balance for 5 minutes including:    Sidestepping at barre, 10 ft each direction, 5 laps        Education Provided  [x] Progress toward goals   [x] Role of therapy   [x] Activity modification  [] Reviewed HEP  []    Home Exercises Provided: HEP discussed, however no handout provided.  Exercises were reviewed and Alessia was able to demonstrate them prior to the end of the session.  Alessia demonstrated good  understanding of the education provided.         Assessment   Patient tolerated all therapy well.  She is motivated.  Alessia is a 69 y.o. female referred to outpatient Physical Therapy with a medical diagnosis of Chemotherapy-induced fatigue. Pt presents with endurance deficits with decreased ability to stand for 1 minute at which time back pain increases and she needs to sit. Pt has decreased strength in B UE, B LE and trunk. Pt with poor balance reactions with neuropathy present.     Pt prognosis is Good.   Pt will benefit from skilled outpatient Physical Therapy to address the deficits stated above and in the chart below, provide pt/family education, and to maximize pt's level of independence.     Plan of care discussed with patient: Yes  Pt's spiritual, cultural and educational needs considered and patient is agreeable to the plan of care and goals as stated below:     Anticipated Barriers for therapy: pt with very busy medical appt schedule, limited  availability    Medical Necessity is demonstrated by the following  History  Co-morbidities and personal factors that may impact the plan of care [] LOW: no personal factors / co-morbidities  [] MODERATE: 1-2 personal factors / co-morbidities  [x] HIGH: 3+ personal factors / co-morbidities    Moderate / High Support Documentation:   Co-morbidities affecting plan of care: COPD,CHF, DM,HTN,CAD,stage 3b chronic kidney disease, hypothyroidsim, GERD, pancreatic insuficiency    Personal Factors:   no deficits     Examination  Body Structures and Functions, activity limitations and participation restrictions that may impact the plan of care [] LOW: addressing 1-2 elements  [] MODERATE: 3+ elements  [x] HIGH: 4+ elements (please support below)    Moderate / High Support Documentation: decreased endurance, decreased tolerance to standing due to back pain,weakness in postural support muscles, weakness in B LE and B UE, balance deficits     Clinical Presentation [] LOW: stable  [] MODERATE: Evolving  [x] HIGH: Unstable     Decision Making/ Complexity Score: high         Goals:  Short Term Goals:  4 weeks    1. Pt. to demonstrate increased strength in B lower extremity to 4/5 in glut max and glut med  to improve functional mobility. (progressing, not met)  2. Pt to demonstrate increased strength in B upper extremity to 4/5 in order to perform functional activities. (progressing, not met)  3. Pt. will improve tandem stance  balance test score to 8 sec in order to perform safe transfers and for patient to be in low/moderate risk for falls category (progressing, not met)  4. Pt will improve 2 min walk test endurance test score to complete 2 min of walking without rest break required  ambulate safely in community (progressing, not met)  5 Pt will initiate home exercise program to improve strength, flexibility, endurance, mobility & balance to return pt to PLOF (progressing, not met)  6. Pt will tolerate 10 min or greater of time in  light-->moderate intensity cardio (I.e. Bike, NuStep) to improve endurance. (progressing, not met)  7. Pt to employ conservation of energy techniques to reduce strain  8. Provide strength exercises for postural support with either written HEP or phone video HEP.    Long Term Goals: 12 weeks    1 Pt will increase strength in  B  upper extremity to 4+/5  in order to perform functional activities independently  (progressing, not met)  2. Pt will increase strength in B lower extremity to 4+/5 n glut med and glut max for improved functional mobility and balance. (progressing, not met)  3. Pt will be independent with HEP to improve ROM, strength, balance,  and independence with ADL's (progressing, not met)  4. Pt. will improve tandem stance balance test score to 12 in order to ambulate safely in community and for patient to be in low risk for falls category (progressing, not met)  5. Pt. will improve 2 min walk  endurance test score to increase by 20% since her last assessment where she can actually walk for 2 minutes without a break in order to transfer independently and for patient to be in low risk for falls category (progressing, not met)  6. Patient will report compliance with cardio program 5x week for 10 -30min each day to improve overall cardiovascular function and decrease cancer related fatigue at discharge. (progressing, not met)     Plan     Plan of care Certification: 6/13/2025 to 9/4/2025.    Outpatient Physical Therapy for a total of 8 times over 3 months to assist in getting Hep that she can do at home.  Will include the following interventions: Gait Training, Manual Therapy, Neuromuscular Re-ed, Patient Education, Self Care, Therapeutic Activities, and Therapeutic Exercise.     Continue with established plan of care working toward PT goals.    Maryellen Post, PT

## 2025-06-13 NOTE — PROGRESS NOTES
SW met with pt while she waited for her PT appointment. Pt shared with SW some of the challenges she is facing during her treatment. She said she is also dealing with back pain and issues that will require surgery. SW provided support as pt discussed her thoughts feelings and plans for self after surgery.     SW provided pt a gas card. No other needs noted at this time.     rTinh Galeas, [facility]

## 2025-06-16 ENCOUNTER — OFFICE VISIT (OUTPATIENT)
Dept: HEMATOLOGY/ONCOLOGY | Facility: CLINIC | Age: 70
End: 2025-06-16
Payer: MEDICARE

## 2025-06-16 ENCOUNTER — INFUSION (OUTPATIENT)
Dept: INFUSION THERAPY | Facility: HOSPITAL | Age: 70
End: 2025-06-16
Attending: INTERNAL MEDICINE
Payer: MEDICARE

## 2025-06-16 VITALS
TEMPERATURE: 99 F | DIASTOLIC BLOOD PRESSURE: 70 MMHG | BODY MASS INDEX: 31.52 KG/M2 | HEART RATE: 75 BPM | OXYGEN SATURATION: 98 % | WEIGHT: 200.81 LBS | SYSTOLIC BLOOD PRESSURE: 151 MMHG | RESPIRATION RATE: 14 BRPM | HEIGHT: 67 IN

## 2025-06-16 VITALS
OXYGEN SATURATION: 98 % | SYSTOLIC BLOOD PRESSURE: 170 MMHG | HEIGHT: 67 IN | WEIGHT: 200.81 LBS | RESPIRATION RATE: 18 BRPM | BODY MASS INDEX: 31.52 KG/M2 | TEMPERATURE: 99 F | DIASTOLIC BLOOD PRESSURE: 90 MMHG | HEART RATE: 74 BPM

## 2025-06-16 DIAGNOSIS — D61.818 PANCYTOPENIA: ICD-10-CM

## 2025-06-16 DIAGNOSIS — E03.8 OTHER SPECIFIED HYPOTHYROIDISM: ICD-10-CM

## 2025-06-16 DIAGNOSIS — K86.89 PANCREATIC INSUFFICIENCY: Chronic | ICD-10-CM

## 2025-06-16 DIAGNOSIS — T45.1X5A IMMUNODEFICIENCY DUE TO CHEMOTHERAPY: ICD-10-CM

## 2025-06-16 DIAGNOSIS — E31.20 MEN (MULTIPLE ENDOCRINE NEOPLASIA): Chronic | ICD-10-CM

## 2025-06-16 DIAGNOSIS — D84.821 IMMUNODEFICIENCY DUE TO CHEMOTHERAPY: ICD-10-CM

## 2025-06-16 DIAGNOSIS — C34.31 CANCER OF LOWER LOBE OF RIGHT LUNG: Primary | ICD-10-CM

## 2025-06-16 DIAGNOSIS — Z79.69 IMMUNODEFICIENCY DUE TO CHEMOTHERAPY: ICD-10-CM

## 2025-06-16 PROCEDURE — 96417 CHEMO IV INFUS EACH ADDL SEQ: CPT | Mod: PN

## 2025-06-16 PROCEDURE — G2211 COMPLEX E/M VISIT ADD ON: HCPCS | Mod: ,,, | Performed by: NURSE PRACTITIONER

## 2025-06-16 PROCEDURE — 25000003 PHARM REV CODE 250: Mod: PN | Performed by: NURSE PRACTITIONER

## 2025-06-16 PROCEDURE — 99214 OFFICE O/P EST MOD 30 MIN: CPT | Mod: S$PBB,,, | Performed by: NURSE PRACTITIONER

## 2025-06-16 PROCEDURE — 96367 TX/PROPH/DG ADDL SEQ IV INF: CPT | Mod: PN

## 2025-06-16 PROCEDURE — 63600175 PHARM REV CODE 636 W HCPCS: Mod: PN | Performed by: NURSE PRACTITIONER

## 2025-06-16 PROCEDURE — 99999 PR PBB SHADOW E&M-EST. PATIENT-LVL V: CPT | Mod: PBBFAC,,, | Performed by: NURSE PRACTITIONER

## 2025-06-16 PROCEDURE — 96413 CHEMO IV INFUSION 1 HR: CPT | Mod: PN

## 2025-06-16 PROCEDURE — 99215 OFFICE O/P EST HI 40 MIN: CPT | Mod: PBBFAC,PN,25 | Performed by: NURSE PRACTITIONER

## 2025-06-16 PROCEDURE — 96375 TX/PRO/DX INJ NEW DRUG ADDON: CPT | Mod: PN

## 2025-06-16 PROCEDURE — A4216 STERILE WATER/SALINE, 10 ML: HCPCS | Mod: PN | Performed by: NURSE PRACTITIONER

## 2025-06-16 RX ORDER — DIPHENHYDRAMINE HYDROCHLORIDE 50 MG/ML
50 INJECTION, SOLUTION INTRAMUSCULAR; INTRAVENOUS ONCE AS NEEDED
Status: DISCONTINUED | OUTPATIENT
Start: 2025-06-16 | End: 2025-06-16 | Stop reason: HOSPADM

## 2025-06-16 RX ORDER — EPINEPHRINE 0.3 MG/.3ML
0.3 INJECTION SUBCUTANEOUS ONCE AS NEEDED
Status: CANCELLED | OUTPATIENT
Start: 2025-06-16

## 2025-06-16 RX ORDER — DIPHENHYDRAMINE HYDROCHLORIDE 50 MG/ML
50 INJECTION, SOLUTION INTRAMUSCULAR; INTRAVENOUS ONCE AS NEEDED
Status: CANCELLED | OUTPATIENT
Start: 2025-06-16

## 2025-06-16 RX ORDER — HEPARIN 100 UNIT/ML
500 SYRINGE INTRAVENOUS
Status: DISCONTINUED | OUTPATIENT
Start: 2025-06-16 | End: 2025-06-16 | Stop reason: HOSPADM

## 2025-06-16 RX ORDER — SODIUM CHLORIDE 0.9 % (FLUSH) 0.9 %
10 SYRINGE (ML) INJECTION
Status: CANCELLED | OUTPATIENT
Start: 2025-06-16

## 2025-06-16 RX ORDER — FAMOTIDINE 10 MG/ML
20 INJECTION, SOLUTION INTRAVENOUS
Status: COMPLETED | OUTPATIENT
Start: 2025-06-16 | End: 2025-06-16

## 2025-06-16 RX ORDER — FAMOTIDINE 10 MG/ML
20 INJECTION, SOLUTION INTRAVENOUS
Status: CANCELLED | OUTPATIENT
Start: 2025-06-16

## 2025-06-16 RX ORDER — HEPARIN 100 UNIT/ML
500 SYRINGE INTRAVENOUS
Status: CANCELLED | OUTPATIENT
Start: 2025-06-16

## 2025-06-16 RX ORDER — SODIUM CHLORIDE 0.9 % (FLUSH) 0.9 %
10 SYRINGE (ML) INJECTION
Status: DISCONTINUED | OUTPATIENT
Start: 2025-06-16 | End: 2025-06-16 | Stop reason: HOSPADM

## 2025-06-16 RX ORDER — HYDROMORPHONE HYDROCHLORIDE 2 MG/ML
0.5 INJECTION, SOLUTION INTRAMUSCULAR; INTRAVENOUS; SUBCUTANEOUS ONCE
Status: COMPLETED | OUTPATIENT
Start: 2025-06-16 | End: 2025-06-16

## 2025-06-16 RX ORDER — HYDROMORPHONE HYDROCHLORIDE 0.5 MG/.5ML
0.5 INJECTION INTRAMUSCULAR; INTRAVENOUS; SUBCUTANEOUS ONCE
Refills: 0 | Status: CANCELLED
Start: 2025-06-16 | End: 2025-06-16

## 2025-06-16 RX ORDER — EPINEPHRINE 0.3 MG/.3ML
0.3 INJECTION SUBCUTANEOUS ONCE AS NEEDED
Status: DISCONTINUED | OUTPATIENT
Start: 2025-06-16 | End: 2025-06-16 | Stop reason: HOSPADM

## 2025-06-16 RX ADMIN — HYDROMORPHONE HYDROCHLORIDE 0.5 MG: 2 INJECTION INTRAMUSCULAR; INTRAVENOUS; SUBCUTANEOUS at 10:06

## 2025-06-16 RX ADMIN — FAMOTIDINE 20 MG: 10 INJECTION INTRAVENOUS at 10:06

## 2025-06-16 RX ADMIN — CARBOPLATIN 220 MG: 10 INJECTION, SOLUTION INTRAVENOUS at 12:06

## 2025-06-16 RX ADMIN — DEXAMETHASONE SODIUM PHOSPHATE 0.25 MG: 10 INJECTION, SOLUTION INTRAMUSCULAR; INTRAVENOUS at 10:06

## 2025-06-16 RX ADMIN — SODIUM CHLORIDE 1000 ML: 9 INJECTION, SOLUTION INTRAVENOUS at 10:06

## 2025-06-16 RX ADMIN — PACLITAXEL 96 MG: 6 INJECTION, SOLUTION INTRAVENOUS at 11:06

## 2025-06-16 RX ADMIN — DIPHENHYDRAMINE HYDROCHLORIDE 50 MG: 50 INJECTION, SOLUTION INTRAMUSCULAR; INTRAVENOUS at 10:06

## 2025-06-16 RX ADMIN — Medication 10 ML: at 01:06

## 2025-06-16 NOTE — PROGRESS NOTES
PATIENT: Alessia Nelson  MRN: [id number]  DATE: 6/16/2025      Diagnosis:   1. Cancer of lower lobe of right lung    2. Immunodeficiency due to chemotherapy    3. MEN (multiple endocrine neoplasia)    4. Pancreatic insufficiency    5. Other specified hypothyroidism    6. Pancytopenia        Chief Complaint: Lung Cancer (Treatment clearance)      Subjective:    Interval History:  Ms. Nelson is a 69 y.o. female with CAD, CHF, COPD, depression, GERD, HTN, HLD, Seizures, h/o CVA, thyroid disease who presents for NSCLC.  Since the last clinic visit the patient sates she is having more back pain and pain to chest where she is receiving radiation. . Patient also notes some cracks in her heels as well. She endorses improvement in her gait. The patient endorses continued intermittent numbness in her hands. The patient denies CP, cough, SOB, abdominal pain, nausea, vomiting, constipation, diarrhea.  The patient denies fever, chills, night sweats, weight loss, new lumps or bumps, easy bruising or bleeding.    Prior History:  The patient was being followed for a nodule in the right lower lobe and had PET-CT on 05/22/2024 which showed no avidity.  CT chest on 11/11/2024 showed enlargement of the ground-glass right lower lobe lung nodule measuring 18 x 19 mm and small bilateral soft tissue pulmonary nodules measuring 3-8 mm.  The patient underwent robotic bronchoscopy and EBUS on 02/20/2025 with pathology showing squamous cell carcinoma and biopsy of right lower lung nodule and a positive station 7 lymph node.  Stations 4R, 11R S and 11 RI were negative.  TEMPUS tissue testing showed mutations in CDKN2A, FAT1, and TP53.  PD-L1 was <1%.  The patient underwent CT myelogram and MRI of the thoracic spine 03/13/2025 for chronic upper back pain with radiation down bilateral arms.   CT thoracic spine on 03/13/2025 report obtained showing stable expansile mass of the superior thoracic cord extending from T2 through T4 measuring 55 mm  in length by 10 mm medial lateral and 9 mm AP.  MRI T-spine on 03/13/2025 showed expansile mass in the thoracic cord concerning for potential ependymoma versus astrocytoma.   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  Dr Staples 4/15/25 felt the patient needed fenestration with neurosurgery.     Past Medical History:   Past Medical History:   Diagnosis Date    Acute pancreatitis     Anticoagulant long-term use     Back pain     CAD (coronary artery disease)     CHF (congestive heart failure)     COPD (chronic obstructive pulmonary disease)     Depression     Diverticulosis     Encounter for blood transfusion     GERD (gastroesophageal reflux disease)     History of blood clots     1986 AFTER BOWEL RESCETION    History of bowel resection     Hyperlipidemia     Hypertension     Malignant neoplasm of unspecified part of unspecified bronchus or lung     Peritonitis     PONV (postoperative nausea and vomiting)     Pulmonary nodule     Seizures     HAD A SEIZURE FROM PHENERGAN     Stroke     left sided weakness    Thyroid disease     TIA (transient ischemic attack)     Wears dentures     upper       Past Surgical HIstory:   Past Surgical History:   Procedure Laterality Date    ADRENAL GLAND SURGERY      APPENDECTOMY      BACK SURGERY      CAUDAL EPIDURAL STEROID INJECTION N/A 03/01/2021    Procedure: Injection-steroid-epidural-caudal;  Surgeon: Socorro Lorenz MD;  Location: [facility] OR;  Service: Pain Management;  Laterality: N/A;    CHOLECYSTECTOMY      COLONOSCOPY      COLONOSCOPY N/A 06/10/2021    Procedure: COLONOSCOPY;  Surgeon: Sheree Metz MD;  Location: [facility];  Service: Endoscopy;  Laterality: N/A;    COLONOSCOPY N/A 03/08/2022    Procedure: COLONOSCOPY;  Surgeon: Maurilio Rodriguez MD;  Location: [facility] ([address] [place] [address]);  Service:  Endoscopy;  Laterality: N/A;  Pt to perform at-home COVID test morning of procedure-DS  2/24-Blood thinner approval rec'[facility] Dr. Walsh (see letters tab 2/24/22)-DS  3/2-Instructions sent via email-DS  3/7-Pt unable to come earlier due to ride-DS    CORONARY STENT PLACEMENT      CYSTOURETHROSCOPY N/A 11/13/2019    Procedure: CYSTOURETHROSCOPY;  Surgeon: Shun Nuñez MD;  Location: [facility] OR;  Service: Urology;  Laterality: N/A;    DIRECT DIAGNOSTIC LARYNGOSCOPY WITH BRONCHOSCOPY AND ESOPHAGOSCOPY N/A 01/03/2024    Procedure: LARYNGOSCOPY, DIRECT, DIAGNOSTIC, WITH BRONCHOSCOPY AND ESOPHAGOSCOPY;  Surgeon: Mir Belle MD;  Location: [facility] OR;  Service: ENT;  Laterality: N/A;    ENDOBRONCHIAL ULTRASOUND Bilateral 2/20/2025    Procedure: ENDOBRONCHIAL ULTRASOUND (EBUS);  Surgeon: Ismael Cooper MD;  Location: [facility] OR;  Service: Pulmonary;  Laterality: Bilateral;    ENDOSCOPIC ULTRASOUND OF LOWER GASTROINTESTINAL TRACT N/A 03/08/2022    Procedure: ULTRASOUND, LOWER GI TRACT, ENDOSCOPIC;  Surgeon: Maurilio Rodriguez MD;  Location: [address]);  Service: Endoscopy;  Laterality: N/A;  Pt to perform at-home COVID test morning of procedure-DS  2/24-Blood thinner approval rec'[facility] Dr. Walsh (see letters tab 2/24/22)-DS  3/2-Instructions sent via email-DS  3/7-Pt unable to come earlier due to ride-DS    ENDOSCOPIC ULTRASOUND OF UPPER GASTROINTESTINAL TRACT N/A 11/25/2019    Procedure: ULTRASOUND, UPPER GI TRACT, ENDOSCOPIC;  Surgeon: Alhaji Bridges MD;  Location: [facility] ([address] [place] [address]);  Service: Endoscopy;  Laterality: N/A;  5 day hold Plavix, Dr Jovanni Serrano - pg  PM prep    ENDOSCOPIC ULTRASOUND OF UPPER GASTROINTESTINAL TRACT N/A 11/02/2020    Procedure: ULTRASOUND, UPPER GI TRACT, ENDOSCOPIC;  Surgeon: Maurilio Rodriguez MD;  Location: [facility] ENDO (2ND FLR);  Service: Endoscopy;  Laterality: N/A;  5 day hold Plavix, Dr Roman Walsh - pg  Covid-19 test 10/30/20 at [facility]    EPIDURAL STEROID INJECTION INTO  CERVICAL SPINE N/A 12/08/2021    Procedure: Injection-steroid-epidural-cervical;  Surgeon: Socorro Lorenz MD;  Location: [facility] OR;  Service: Pain Management;  Laterality: N/A;    EPIDURAL STEROID INJECTION INTO CERVICAL SPINE N/A 11/30/2023    Procedure: Injection-steroid-epidural-cervical;  Surgeon: Maurilio Carroll MD;  Location: [facility] OR;  Service: Anesthesiology;  Laterality: N/A;  c7-T1    ESOPHAGOGASTRODUODENOSCOPY N/A 06/10/2021    Procedure: EGD (ESOPHAGOGASTRODUODENOSCOPY);  Surgeon: Sheree Metz MD;  Location: [facility] ENDO;  Service: Endoscopy;  Laterality: N/A;    ESOPHAGOGASTRODUODENOSCOPY N/A 09/26/2024    Procedure: EGD (ESOPHAGOGASTRODUODENOSCOPY);  Surgeon: Jovanny Saldivar MD;  Location: [facility] ENDO;  Service: Endoscopy;  Laterality: N/A;    ESOPHAGOSCOPY, USING BOUGIE, WITH DILATION N/A 01/03/2024    Procedure: ESOPHAGOSCOPY, USING BOUGIE, WITH DILATION;  Surgeon: Mir Belle MD;  Location: [facility] OR;  Service: ENT;  Laterality: N/A;    FRACTURE SURGERY Left 05/02/2022    ankle    HERNIA REPAIR      HYSTERECTOMY      INCISIONAL HERNIA REPAIR      INJECTION OF ANESTHETIC AGENT AROUND NERVE Right 05/27/2019    Procedure: RIGHT L5-S3 MEDIAL BRANCH BLOCKS;  Surgeon: Sneha Aragon MD;  Location: [facility] OR;  Service: Pain Management;  Laterality: Right;  **DO NOT STOP PLAVIX**    INJECTION OF JOINT Right 09/22/2021    Procedure: Injection, Joint; Right SI Joint Injection;  Surgeon: Socorro Lorenz MD;  Location: [facility] OR;  Service: Pain Management;  Laterality: Right;  Oral    INSERTION OF DORSAL COLUMN NERVE STIMULATOR FOR TRIAL N/A 06/07/2021    Procedure: INSERTION, NEUROSTIMULATOR, SPINAL CORD, DORSAL COLUMN, FOR TRIAL;  Surgeon: Socorro Lorenz MD;  Location: [facility] OR;  Service: Pain Management;  Laterality: N/A;  nevro rep confirmed start time    INSERTION OF TUNNELED CENTRAL VENOUS CATHETER (CVC) WITH SUBCUTANEOUS PORT N/A 5/7/2025    Procedure: [id number]-C-CATH;  Surgeon: Kolby  "Janet BRIONES MD;  Location: [facility] OR;  Service: General;  Laterality: N/A;    instestine      bowel section    KNEE ARTHROPLASTY Right 12/18/2019    Procedure: ARTHROPLASTY, KNEE;  Surgeon: Ike Briceño II, MD;  Location: [facility] OR;  Service: Orthopedics;  Laterality: Right;    KNEE SURGERY  1983    OOPHORECTOMY      PARATHYROID GLAND SURGERY      3 surgeries    RADIOFREQUENCY ABLATION Right 06/10/2019    Procedure: Radiofrequency Ablation - RIGHT L3-5 RADIOFREQUENCY ABLATION WITH HALYARD COOLIEF THERMAL SYSTEM;  Surgeon: Sneha Aragon MD;  Location: [facility] OR;  Service: Pain Management;  Laterality: Right;  **HOLD PLAVIX x 7 DAYS PRIOR**    REVISION OF KNEE ARTHROPLASTY Right     AVALA with Dr Singh    ROBOTIC BRONCHOSCOPY Bilateral 2/20/2025    Procedure: ROBOTIC BRONCHOSCOPY;  Surgeon: Ismael Cooper MD;  Location: [facility] OR;  Service: Pulmonary;  Laterality: Bilateral;    SPINAL CORD STIMULATOR REMOVAL N/A 02/21/2024    Procedure: REMOVAL, NEUROSTIMULATOR, SPINAL;  Surgeon: Maurilio Carroll MD;  Location: [facility] OR;  Service: Pain Management;  Laterality: N/A;  removal of spinal cord stimulator    UPPER GASTROINTESTINAL ENDOSCOPY         Family History:   Family History   Problem Relation Name Age of Onset    Stroke Maternal Grandmother      Cancer Maternal Grandfather      Stroke Mother      Heart disease Father      Breast cancer Sister         Social History:  reports that she quit smoking about 2 years ago. Her smoking use included cigarettes. She started smoking about 48 years ago. She has a 46 pack-year smoking history. She has never used smokeless tobacco. She reports that she does not currently use alcohol. She reports current drug use.    Allergies:  Review of patient's allergies indicates:   Allergen Reactions    Aspirin Other (See Comments)     "Makes stomach feel like it's on fire"    Phenergan [promethazine] Other (See Comments)     SEIZURES    Nickel     Lortab [hydrocodone-acetaminophen] Itching     Able " "to take generic Norco       Medications:  Current Medications[1]    Review of Systems   Constitutional:  Positive for fatigue. Negative for chills, fever and unexpected weight change.   HENT:  Negative for mouth sores.    Respiratory:  Negative for cough and shortness of breath.    Cardiovascular:  Negative for chest pain and palpitations.   Gastrointestinal:  Negative for abdominal pain, constipation, diarrhea, nausea and vomiting.   Musculoskeletal:  Positive for back pain.   Skin:  Negative for rash.   Neurological:  Negative for headaches.   Hematological:  Negative for adenopathy. Does not bruise/bleed easily.       ECOG Performance Status: 2   Objective:      Vitals:   Vitals:    06/16/25 0910   BP: (!) 151/70   Pulse: 75   Resp: 14   Temp: 98.5 °F (36.9 °C)   SpO2: 98%   Weight: 91.1 kg (200 lb 13.4 oz)   Height: 5' 7" (1.702 m)       Wt Readings from Last 3 Encounters:   06/16/25 91.1 kg (200 lb 13.4 oz)   06/09/25 89.3 kg (196 lb 13.9 oz)   06/09/25 89.3 kg (196 lb 13.9 oz)     Physical Exam  Constitutional:       General: She is not in acute distress.     Appearance: She is well-developed. She is not diaphoretic.   HENT:      Head: Normocephalic and atraumatic.   Cardiovascular:      Rate and Rhythm: Normal rate and regular rhythm.      Heart sounds: Normal heart sounds. No murmur heard.  Pulmonary:      Effort: Pulmonary effort is normal. No respiratory distress.      Breath sounds: Normal breath sounds. No wheezing.   Abdominal:      General: Bowel sounds are normal. There is no distension.      Palpations: Abdomen is soft.      Tenderness: There is no abdominal tenderness. There is no guarding.   Musculoskeletal:      Right lower leg: No edema.      Left lower leg: No edema.   Lymphadenopathy:      Cervical: No cervical adenopathy.      Upper Body:      Right upper body: No supraclavicular or axillary adenopathy.      Left upper body: No supraclavicular or axillary adenopathy.   Skin:     General: Skin " is dry.      Findings: No rash.   Neurological:      Mental Status: She is alert and oriented to person, place, and time.   Psychiatric:         Behavior: Behavior normal.         Laboratory Data:  Lab Visit on 06/16/2025   Component Date Value Ref Range Status    Sodium 06/16/2025 142  136 - 145 mmol/L Final    Potassium 06/16/2025 4.9  3.5 - 5.1 mmol/L Final    Chloride 06/16/2025 107  95 - 110 mmol/L Final    CO2 06/16/2025 23  23 - 29 mmol/L Final    Glucose 06/16/2025 127 (H)  70 - 110 mg/dL Final    BUN 06/16/2025 22  8 - 23 mg/dL Final    Creatinine 06/16/2025 0.9  0.5 - 1.4 mg/dL Final    Calcium 06/16/2025 10.3  8.7 - 10.5 mg/dL Final    Protein Total 06/16/2025 6.8  6.0 - 8.4 gm/dL Final    Albumin 06/16/2025 3.6  3.5 - 5.2 g/dL Final    Bilirubin Total 06/16/2025 0.1  0.1 - 1.0 mg/dL Final    For infants and newborns, interpretation of results should be based   on gestational age, weight and in agreement with clinical   observations.    Premature Infant recommended reference ranges:   0-24 hours:  <8.0 mg/dL   24-48 hours: <12.0 mg/dL   3-5 days:    <15.0 mg/dL   6-29 days:   <15.0 mg/dL    ALP 06/16/2025 94  40 - 150 unit/L Final    AST 06/16/2025 13  11 - 45 unit/L Final    ALT 06/16/2025 15  10 - 44 unit/L Final    Anion Gap 06/16/2025 12  8 - 16 mmol/L Final    eGFR 06/16/2025 >60  >60 mL/min/1.73/m2 Final    Estimated GFR calculated using the CKD-EPI creatinine (2021) equation.    WBC 06/16/2025 3.58 (L)  3.90 - 12.70 K/uL Final    RBC 06/16/2025 3.25 (L)  4.00 - 5.40 M/uL Final    HGB 06/16/2025 9.9 (L)  12.0 - 16.0 gm/dL Final    HCT 06/16/2025 31.2 (L)  37.0 - 48.5 % Final    MCV 06/16/2025 96  82 - 98 fL Final    MCH 06/16/2025 30.5  27.0 - 31.0 pg Final    MCHC 06/16/2025 31.7 (L)  32.0 - 36.0 g/dL Final    RDW 06/16/2025 17.9 (H)  11.5 - 14.5 % Final    Platelet Count 06/16/2025 134 (L)  150 - 450 K/uL Final    MPV 06/16/2025 9.0 (L)  9.2 - 12.9 fL Final    Nucleated RBC 06/16/2025 0  <=0 /100  WBC Final    Neut % 06/16/2025 74.4 (H)  38 - 73 % Final    Lymph % 06/16/2025 15.9 (L)  18 - 48 % Final    Mono % 06/16/2025 6.7  4 - 15 % Final    Eos % 06/16/2025 0.8  <=8 % Final    Basophil % 06/16/2025 0.8  <=1.9 % Final    Imm Grans % 06/16/2025 1.4 (H)  0.0 - 0.5 % Final    Neut # 06/16/2025 2.66  1.8 - 7.7 K/uL Final    Lymph # 06/16/2025 0.57 (L)  1 - 4.8 K/uL Final    Mono # 06/16/2025 0.24 (L)  0.3 - 1 K/uL Final    Eos # 06/16/2025 0.03  <=0.5 K/uL Final    Baso # 06/16/2025 0.03  <=0.2 K/uL Final    Imm Grans # 06/16/2025 0.05 (H)  0.00 - 0.04 K/uL Final    Mild elevation in immature granulocytes is non specific and can be seen in a variety of conditions including stress response, acute inflammation, trauma and pregnancy. Correlation with other laboratory and clinical findings is essential.         Imaging:     MRI Brain 3/07/25  Gray matter distinction is preserved throughout the brain parenchyma. The ventricles are midline without mass effect. There is mild periventricular and deep white matter FLAIR and T2 hyperintensities likely reflecting changes of chronic microvascular ischemia. No intra-axial hemorrhage or restricted diffusion. No intra-axial fluid collection or mass. Bone marrow signal of the calvarium is within normal limits. Major vascular flow voids are within normal limits. The orbits globes and optic nerves are grossly unremarkable. Paranasal sinuses are unremarkable.    PET/CT 3/10/25  Quality of the study: Adequate.     There is increased FDG avidity within a posterior right upper lobe pulmonary nodule measuring 2.0 cm on transmission CT images. This shows max SUV of 3.6 on today's exam versus 1.8 on prior exam.     No other abnormal regions of FDG uptake are identified.     Physiologic uptake of the tracer is present within the brain, salivary glands, myocardium, GI and  tracts.     Incidental CT findings: N/A       Assessment:       1. Cancer of lower lobe of right lung    2.  Immunodeficiency due to chemotherapy    3. MEN (multiple endocrine neoplasia)    4. Pancreatic insufficiency    5. Other specified hypothyroidism    6. Pancytopenia       Plan:     NSCLC - Pt with stage IIIa pT1cN2 NSCLC SCC of the RLL with met to station 7 LN  -TEMPUS tissue testing showed mutations in CDKN2A, FAT1, and TP53.  PD-L1 was <1%  -MRI brain KRISHNA on 3/07/25  -Plan for Carboplatin and Paclitaxel with XRT followed by year treatment with Durvalumab  -Will proceed with cycle 5 of chemo  -Plan to proceed with 6 cycles total  -Radiation to complete 6/25/25  -Will repeat CT chest 7/2/25 prior to proceeding with immunotherapy; f/u with Dr. 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  -Dr Staples 4/15/25 felt the patient needed fenestration with neurosurgery  -Pt following with Dr Freddy Burch    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  -Pt to see genetecist    HTN - pt on amlodipine, hydralazine, metoprolol  -BP controlled  -Will monitor    Hypothyroidism - pt on levothyroxine 75mcg daily  -WIll monitor    Pancreatic Insufficiency - due to prior chronic pancreatitis   -Patient on Zenpep   -Management per GI    Pancytopenia - Due to radiation and chemo  Lab Results   Component Value Date    WBC 3.58 (L) 06/16/2025    HGB 9.9 (L) 06/16/2025    HCT 31.2 (L) 06/16/2025    MCV 96 06/16/2025     (L)  06/16/2025   -Will monitor    Route Chart for Scheduling    Med Onc Chart Routing      Follow up with physician . As scheduled for CT scans and CBC, CMP on 7/2/25; f/u with Dr. William on 7/7/25 with results.   Follow up with [facility] . Cancel appt with me and treatment for next week.   Infusion scheduling note    Injection scheduling note    Labs    Imaging    Pharmacy appointment    Other referrals              Treatment Plan Information   OP NSCLC PACLitaxel CARBOplatin QW + radiation Sea William MD   Associated diagnosis: Cancer of lower lobe of right lung  cT1c, cN2(f), cM0 noted on 3/14/2025   Line of treatment: Adjuvant  Treatment Goal: Curative     Upcoming Treatment Dates - OP NSCLC PACLitaxel CARBOplatin QW + radiation    6/16/2025       Pre-Medications       palonosetron 0.25mg/dexAMETHasone 12mg in NS IVPB 0.25 mg 50 mL       diphenhydrAMINE (BENADRYL) 50 mg in NS 50 mL IVPB       famotidine (PF) injection 20 mg       Chemotherapy       PACLitaxeL (TAXOL) in 0.9% NaCl 250 mL chemo infusion       CARBOplatin (PARAPLATIN) 220 mg in 0.9% NaCl 272 mL chemo infusion  6/23/2025       Pre-Medications       palonosetron 0.25mg/dexAMETHasone 12mg in NS IVPB 0.25 mg 50 mL       diphenhydrAMINE (BENADRYL) 50 mg in NS 50 mL IVPB       famotidine (PF) injection 20 mg       Chemotherapy       PACLitaxeL (TAXOL) in 0.9% NaCl 250 mL chemo infusion       CARBOplatin (PARAPLATIN) 220 mg in 0.9% NaCl 272 mL chemo infusion    Supportive Plan Information  IV FLUIDS AND ELECTROLYTES Sea William MD   Associated Diagnosis: Cancer related pain   noted on 6/9/2025   Line of treatment: Supportive Care   Treatment goal: Supportive     Upcoming Treatment Dates - IV FLUIDS AND ELECTROLYTES    No upcoming days in selected categories.    Visit today included increased complexity associated with the care of the episodic problem (chemotherapy) addressed and managing the longitudinal care of the patient due to the serious and/or  complex managed problem(s) NSCLC.    EMI Claudio  Hematology and Medical Oncology  [facility]  [facility]    37 minutes of total time spent on the encounter, which includes face to face time and non-face to face time preparing to see the patient (eg, review of tests), Obtaining and/or reviewing separately obtained history, documenting clinical information in the electronic or other health record, independently interpreting results if documented above (not separately reported) and communicating results to the patient/family/caregiver, or Care coordination (not separately reported).            [1]   Current Outpatient Medications   Medication Sig Dispense Refill    acetaminophen (TYLENOL) 325 MG tablet Take 2 tablets (650 mg total) by mouth every 6 (six) hours as needed for Pain (Do not take with any other tylenol containing products).  0    allopurinoL (ZYLOPRIM) 300 MG tablet TAKE 1 TABLET DAILY 90 tablet 1    amLODIPine (NORVASC) 5 MG tablet TAKE 1 TABLET(5 MG) BY MOUTH TWICE DAILY 180 tablet 1    busPIRone (BUSPAR) 5 MG Tab Take 1 tablet (5 mg total) by mouth 2 (two) times daily. 180 tablet 3    calcium carbonate (CALCIUM 500) 500 mg calcium (1,250 mg) chewable tablet Take 1 tablet by mouth once daily.      clopidogreL (PLAVIX) 75 mg tablet Take 1 tablet (75 mg total) by mouth once daily. 90 tablet 3    colestipoL (COLESTID) 1 gram Tab TAKE 2 TABLETS ONCE DAILY 180 tablet 1    cyanocobalamin 1,000 mcg/mL injection INJECT 1 ML IN THE MUSCLE EVERY 14 DAYS 10 mL 0    diphenhydrAMINE-aluminum-magnesium hydroxide-simethicone-LIDOcaine viscous HCl 2% Swish and spit 15 mLs every 4 (four) hours as needed. 500 each 3    duke's soln (benadryl 30 mL, mylanta 30 mL, LIDOcaine 30 mL, nystatin 30 mL) 120mL Take 10 mLs by mouth 4 (four) times daily. 120 mL 0    EScitalopram oxalate (LEXAPRO) 20 MG tablet TAKE 1 TABLET EVERY EVENING 90 tablet 3    fluticasone propionate  (FLONASE) 50 mcg/actuation nasal spray 2 sprays (100 mcg total) by Each Nostril route 2 (two) times daily. 48 g 3    furosemide (LASIX) 20 MG tablet TAKE 1 TABLET(20 MG) BY MOUTH EVERY DAY 90 tablet 3    gabapentin (NEURONTIN) 400 MG capsule Take 1 capsule (400 mg total) by mouth 2 (two) times daily. 180 capsule 3    hydrALAZINE (APRESOLINE) 25 MG tablet Take 1 tablet (25 mg total) by mouth daily as needed (Check BP after taking amlodipine, Lasix, metoprolol.  If it continues to stay greater than 160/100 take hydralazine). 30 tablet 0    levothyroxine (SYNTHROID) 75 MCG tablet TAKE 1 TABLET DAILY 90 tablet 1    LIDOcaine-prilocaine (EMLA) cream Apply topically as needed (Apply 30 minutes prior to port access). 30 g 2    magnesium oxide (MAG-OX) 400 mg (241.3 mg magnesium) tablet Take 1 tablet (400 mg total) by mouth once daily.      methocarbamoL (ROBAXIN) 750 MG Tab Take 1 tablet (750 mg total) by mouth every 8 (eight) hours as needed (muscle spasm). 270 tablet 3    metoprolol succinate (TOPROL-XL) 50 MG 24 hr tablet Take 1 tablet (50 mg total) by mouth once daily. Pt takes 1/2 tab daily 90 tablet 2    mupirocin (BACTROBAN) 2 % ointment Apply topically once daily.      OLANZapine (ZYPREXA) 5 MG tablet Take 1 tablet (5 mg total) by mouth every evening. On days 1-3 of each chemotherapy cycle. 12 tablet 1    ondansetron (ZOFRAN) 8 MG tablet Take 1 tablet (8 mg total) by mouth every 8 (eight) hours as needed for Nausea. 30 tablet 2    oxyCODONE-acetaminophen (PERCOCET)  mg per tablet Take 1 tablet by mouth every 8 (eight) hours as needed.      oxyCODONE-acetaminophen (PERCOCET) 5-325 mg per tablet Take 1 tablet by mouth every 8 (eight) hours as needed for Pain.      pantoprazole (PROTONIX) 40 MG tablet TAKE 1 TABLET DAILY 90 tablet 3    potassium chloride SA (K-DUR,KLOR-CON M) 10 MEQ tablet Take 1 tablet (10 mEq total) by mouth once daily. 90 tablet 3    rosuvastatin (CRESTOR) 10 MG tablet TAKE 1 TABLET DAILY 90  tablet 3    sucralfate (CARAFATE) 1 gram tablet Take 1 tablet (1 g total) by mouth 4 (four) times daily before meals and nightly. 120 tablet 3    traMADoL (ULTRAM) 50 mg tablet Take 1 tablet (50 mg total) by mouth every 6 (six) hours as needed for Pain. 10 tablet 0    traZODone (DESYREL) 100 MG tablet TAKE 1 TABLET EVERY EVENING 90 tablet 3    triamcinolone acetonide 0.1% (KENALOG) 0.1 % ointment Apply topically 2 (two) times daily.      vit A/vit C/vit E/zinc/copper (PRESERVISION AREDS ORAL) Take 1 capsule by mouth 2 (two) times a day.      vitamin D (VITAMIN D3) 1000 units Tab Take 1,000 Units by mouth 2 (two) times a day.       No current facility-administered medications for this visit.     Facility-Administered Medications Ordered in Other Visits   Medication Dose Route Frequency Provider Last Rate Last Admin    oxyCODONE immediate release tablet 5 mg  5 mg Oral Once PRN Paul Nunez MD

## 2025-06-16 NOTE — PLAN OF CARE
Problem: Adult Inpatient Plan of Care  Goal: Patient-Specific Goal (Individualized)  Outcome: Progressing  Flowsheets (Taken 6/16/2025 1222)  Individualized Care Needs: Conversation, blanket, pillow, friend at chairside  Anxieties, Fears or Concerns: None  Patient/Family-Specific Goals (Include Timeframe): No s/s of reaction from treatment     Problem: Fatigue  Goal: Improved Activity Tolerance  Outcome: Progressing  Intervention: Promote Improved Energy  Flowsheets (Taken 6/16/2025 1222)  Fatigue Management:   activity assistance provided   fatigue-related activity identified   paced activity encouraged   frequent rest breaks encouraged  Sleep/Rest Enhancement:   natural light exposure provided   relaxation techniques promoted  Activity Management: Ambulated -L4  Environmental Support:   environmental consistency promoted   personal routine supported   rest periods encouraged     Problem: Adult Inpatient Plan of Care  Goal: Plan of Care Review  Outcome: Progressing  Flowsheets (Taken 6/16/2025 1222)  Plan of Care Reviewed With: patient  Pt tolerated Taxol/carbo infusion well, NAD. Pt education reinforced on potential side effects, what to expect and when to call the physician. Pt given a schedule and reviewed, pt verbalized understanding. Pt ambulated out of the clinic without difficulty independently.

## 2025-06-18 ENCOUNTER — CLINICAL SUPPORT (OUTPATIENT)
Dept: REHABILITATION | Facility: HOSPITAL | Age: 70
End: 2025-06-18
Payer: MEDICARE

## 2025-06-18 ENCOUNTER — DOCUMENTATION ONLY (OUTPATIENT)
Dept: RADIATION ONCOLOGY | Facility: CLINIC | Age: 70
End: 2025-06-18
Payer: MEDICARE

## 2025-06-18 DIAGNOSIS — M54.59 OTHER LOW BACK PAIN: ICD-10-CM

## 2025-06-18 DIAGNOSIS — G62.9 PERIPHERAL POLYNEUROPATHY: Primary | ICD-10-CM

## 2025-06-18 DIAGNOSIS — T45.1X5A CHEMOTHERAPY-INDUCED FATIGUE: ICD-10-CM

## 2025-06-18 DIAGNOSIS — K13.79 MOUTH SORES: ICD-10-CM

## 2025-06-18 DIAGNOSIS — R53.83 CHEMOTHERAPY-INDUCED FATIGUE: ICD-10-CM

## 2025-06-18 PROCEDURE — 97110 THERAPEUTIC EXERCISES: CPT | Mod: PN

## 2025-06-18 NOTE — PROGRESS NOTES
[facility]  Physical Therapy Progress Note    Visit Date: 6/18/2025    Name: Alessia Nelson  MRN: [id number]  Therapy Diagnosis:   Encounter Diagnoses   Name Primary?    Peripheral polyneuropathy Yes    Other low back pain     Chemotherapy-induced fatigue          Physician: Sherry Stewart FNP-C    Physician Orders: PT Eval and Treat   Medical Diagnosis from Referral:   Encounter Diagnoses   Name Primary?    Peripheral polyneuropathy     Other low back pain     Chemotherapy-induced fatigue      Evaluation Date: 6/13/2025  Authorization Period Expiration: 10 authed 6/4/2025-12/31/2025    Plan of Care Expiration: 6/4/2025 - 8/4/2025 pending auth  Progress Note Due: 7/4/2025  Visit # / Visits authorized: eval +2/ 16 auth pending   FOTO: 1/3    Precautions: Standard, Diabetes, CHF, Immunosuppression, and cancer, COPD  Treatment considerations: cannot lay on left side will cause her L hand to go numb.  Time In: 11:08 AM   Time Out: 11:55 AM  Total Appointment Time (timed & untimed codes): 47 minutes    Chart review: She had an EBUS 2/2025 and was diagnosed with lung cancer. She will start cycle 3 of treatment Monday. She reports fatigue with treatment. She states she has always had trouble sleeping, but since starting treatment she has been sleeping well. She will also take a nap. She reports high anxiety. She is established with Dr. Soliz. She takes Lexapro for depression and states it is effective. She has mouth sores which makes eating difficult. 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 The steroids caused constipation, but she is using the bathroom now. Her current activity level is low, but she usually gets in her pool and walks. She has also gone to PT previously to help with her low back pain. She has neuropathy to her hands and feet with mostly numbness.        Subjective     Patient states: Had her last chemo Monday, last radiation  treatment will be next week then following week will begin immunotherapy. Cyst in her thoracic spine has really been bothering her. Anticipating having surgery to remove it eventually. Planning to get her pool cleaned out to walk in the pool.    Pain:  Current  6/10   Location:  back   Description: all kinds of pain   Objective     Alessia participated in / received the following treatment:    Therapeutic Exercise to develop strength, endurance, ROM, flexibility, and core stabilization for 47 minutes including:  Nustep 17 min L4 (98% O2 Sats, 77 HR)    Seated ther ex  LAQ x 10  Hip ADD with ball 5 sec hold x10  Hip ABD RTB 2x10  HSC RTB x10  4# dowel biceps curls x10  4# dowel overhead press x 10 reps  Tricep extensions RTB 10x    Standing at barre  HR/toe raises x15  Mini squats 2x10 hands on barre  Hip ABD 2 x10 each side  Hot seat attempted, but painful so stopped this date.      Neuromuscular Re-education to develop Coordination, Proprioception, and Balance for 0 minutes including:  Sidestepping at barre, 10 ft each direction, 5 laps        Education Provided  [x] Progress toward goals   [x] Role of therapy   [x] Activity modification  [] Reviewed HEP  []    Home Exercises Provided: HEP discussed, however no handout provided.  Exercises were reviewed and Alessia was able to demonstrate them prior to the end of the session.  Alessia demonstrated good  understanding of the education provided.         Assessment   Patient tolerated all therapy fairly well today.  She is motivated to participate however limited today by fatigue and requested to end session few minutes early. Reports not doing much exercise at home or house chores due to fatigue. Will progress as able Alessia is a 69 y.o. female referred to outpatient Physical Therapy with a medical diagnosis of Chemotherapy-induced fatigue. Pt presents with endurance deficits with decreased ability to stand for 1 minute at which time back pain increases and she needs to  sit. Pt has decreased strength in B UE, B LE and trunk. Pt with poor balance reactions with neuropathy present.     Pt prognosis is Good.   Pt will benefit from skilled outpatient Physical Therapy to address the deficits stated above and in the chart below, provide pt/family education, and to maximize pt's level of independence.     Plan of care discussed with patient: Yes  Pt's spiritual, cultural and educational needs considered and patient is agreeable to the plan of care and goals as stated below:     Anticipated Barriers for therapy: pt with very busy medical appt schedule, limited availability    Medical Necessity is demonstrated by the following  History  Co-morbidities and personal factors that may impact the plan of care [] LOW: no personal factors / co-morbidities  [] MODERATE: 1-2 personal factors / co-morbidities  [x] HIGH: 3+ personal factors / co-morbidities    Moderate / High Support Documentation:   Co-morbidities affecting plan of care: COPD,CHF, DM,HTN,CAD,stage 3b chronic kidney disease, hypothyroidsim, GERD, pancreatic insuficiency    Personal Factors:   no deficits     Examination  Body Structures and Functions, activity limitations and participation restrictions that may impact the plan of care [] LOW: addressing 1-2 elements  [] MODERATE: 3+ elements  [x] HIGH: 4+ elements (please support below)    Moderate / High Support Documentation: decreased endurance, decreased tolerance to standing due to back pain,weakness in postural support muscles, weakness in B LE and B UE, balance deficits     Clinical Presentation [] LOW: stable  [] MODERATE: Evolving  [x] HIGH: Unstable     Decision Making/ Complexity Score: high         Goals:  Short Term Goals:  4 weeks    1. Pt. to demonstrate increased strength in B lower extremity to 4/5 in glut max and glut med  to improve functional mobility. (progressing, not met)  2. Pt to demonstrate increased strength in B upper extremity to 4/5 in order to perform  functional activities. (progressing, not met)  3. Pt. will improve tandem stance  balance test score to 8 sec in order to perform safe transfers and for patient to be in low/moderate risk for falls category (progressing, not met)  4. Pt will improve 2 min walk test endurance test score to complete 2 min of walking without rest break required  ambulate safely in community (progressing, not met)  5 Pt will initiate home exercise program to improve strength, flexibility, endurance, mobility & balance to return pt to PLOF (progressing, not met)  6. Pt will tolerate 10 min or greater of time in light-->moderate intensity cardio (I.e. Bike, NuStep) to improve endurance. (progressing, not met)  7. Pt to employ conservation of energy techniques to reduce strain  8. Provide strength exercises for postural support with either written HEP or phone video HEP.    Long Term Goals: 12 weeks    1 Pt will increase strength in  B  upper extremity to 4+/5  in order to perform functional activities independently  (progressing, not met)  2. Pt will increase strength in B lower extremity to 4+/5 n glut med and glut max for improved functional mobility and balance. (progressing, not met)  3. Pt will be independent with HEP to improve ROM, strength, balance,  and independence with ADL's (progressing, not met)  4. Pt. will improve tandem stance balance test score to 12 in order to ambulate safely in community and for patient to be in low risk for falls category (progressing, not met)  5. Pt. will improve 2 min walk  endurance test score to increase by 20% since her last assessment where she can actually walk for 2 minutes without a break in order to transfer independently and for patient to be in low risk for falls category (progressing, not met)  6. Patient will report compliance with cardio program 5x week for 10 -30min each day to improve overall cardiovascular function and decrease cancer related fatigue at discharge. (progressing, not  met)     Plan     Plan of care Certification: 6/13/2025 to 9/4/2025.    Outpatient Physical Therapy for a total of 8 times over 3 months to assist in getting Hep that she can do at home.  Will include the following interventions: Gait Training, Manual Therapy, Neuromuscular Re-ed, Patient Education, Self Care, Therapeutic Activities, and Therapeutic Exercise.     Continue with established plan of care working toward PT goals.    Xin Pickens, PT, CLT

## 2025-06-20 ENCOUNTER — TELEPHONE (OUTPATIENT)
Dept: REHABILITATION | Facility: HOSPITAL | Age: 70
End: 2025-06-20
Payer: MEDICARE

## 2025-06-20 NOTE — TELEPHONE ENCOUNTER
Pt called the office to cancel her PT/Funct apt on today; apt cxl'd as requested. Pt will keep her already scheduled apt on 6/24/25 at 11:00 am. Location and check-in process known to pt.

## 2025-06-23 ENCOUNTER — TELEPHONE (OUTPATIENT)
Dept: HEMATOLOGY/ONCOLOGY | Facility: CLINIC | Age: 70
End: 2025-06-23
Payer: MEDICARE

## 2025-06-23 ENCOUNTER — TELEPHONE (OUTPATIENT)
Dept: REHABILITATION | Facility: HOSPITAL | Age: 70
End: 2025-06-23
Payer: MEDICARE

## 2025-06-23 DIAGNOSIS — C34.31 CANCER OF LOWER LOBE OF RIGHT LUNG: Primary | ICD-10-CM

## 2025-06-23 NOTE — TELEPHONE ENCOUNTER
Returned call to pt, no ans, voicemail not set up; PT apt on today cxl'd as requested.    Copied from CRM #[id number]. Topic: General Inquiry - Patient Advice  >> Jun 23, 2025  8:50 AM Denny wrote:  Type: Needs Medical Advice  Who Called:  pt   Symptoms (please be specific):    How long has patient had these symptoms:    Pharmacy name and phone #:    Best Call Back Number: [phone]  Additional Information: pt is requesting a call back to reschedule appt today

## 2025-06-23 NOTE — TELEPHONE ENCOUNTER
Copied from CRM #[id number]. Topic: General Inquiry - Patient Advice  >> Jun 23, 2025  8:48 AM Denny wrote:  Type: Needs Medical Advice  Who Called:  pt   Symptoms (please be specific):  diarrhea, bleeding, problems using the restroom   How long has patient had these symptoms:    Pharmacy name and phone #:    Best Call Back Number: [phone]  Additional Information: pt is requesting a call back from nurse regarding her symptoms

## 2025-06-23 NOTE — TELEPHONE ENCOUNTER
Spoke with patient.  [facility] is full tomorrow.  Scheduled her with tasneem and cbc/cmp/mag and [facility].  She states she would like her pancreatic enzymes scheduled as well. Nurse will speak with mac and tasneem to see if additional labs are needed at this time.      Message routed to mac and tasneem

## 2025-06-23 NOTE — PROGRESS NOTES
"PATIENT: Alessia Nelson  MRN: [id number]  DATE: 6/24/2025      Diagnosis:   1. Dehydration    2. Diarrhea, unspecified type        Chief Complaint: Diarrhea (X 5 days) and Anuria  (X 5 days )          Subjective:    Interval History: Ms. Nelson is a 69 y.o. female who presents for urgent visit.  "She notes since last Wednesday having 5-6 loose stools per day, worse than usual, as she has chronic diarrhea since her colon surgery 38 years ago.     She notes having a nose bleed last time about a month ago---and had one yesterday as well, which lasted on and off for several hours. Dark red clots which were the size of quarters and larger.       She notes drinking plenty Pedialyte and tea yesterday--because of her many loose stools---but she hasn't been able to urinate since yesterday during the day. She went very little today.  No burning noted.     She notes RUQ abdominal pain since Wednesday---just like she has when her chronic pancreatitis flares up. She is taking percocet  every 6-8 hours around the clock. Her pain is front to back.      Patient states "I just don't feel good at all.""    She reports today that diarrhea is still present but improved since yesterday. She reports difficulty urinating since yesterday. Denies fever, chills, sob, palpitations, constipation, bleeding currently. Epistaxis episode has not returned. She endorses fatigue, chronic chest pain due to radiation, diarrhea, occasional nausea, chronic back pain, occasional headache. She takes colestipol for diarrhea, which has helped. She has not taken imodium but does have some at home.        Oncologic History:   Oncology History   Cancer of lower lobe of right lung   3/14/2025 Initial Diagnosis    Cancer of lower lobe of right lung     3/14/2025 Cancer Staged    Staging form: Lung, AJCC V9  - Clinical stage from 3/14/2025: cT1c, cN2(f), cM0     5/12/2025 -  Chemotherapy    Treatment Summary   Plan Name: OP NSCLC PACLitaxel CARBOplatin QW " + radiation  Treatment Goal: Curative  Status: Active  Start Date: 5/12/2025  End Date: 6/23/2025 (Planned)  Provider: Sea William MD  Chemotherapy: CARBOplatin (PARAPLATIN) 205 mg in 0.9% NaCl 305.5 mL chemo infusion, 200 mg, Intravenous, Clinic/HOD 1 time, 6 of 7 cycles  Administration: 205 mg (5/12/2025), 200 mg (5/19/2025), 200 mg (5/26/2025), 220 mg (6/2/2025), 220 mg (6/9/2025), 220 mg (6/16/2025)  PACLitaxeL (TAXOL) 45 mg/m2 = 96 mg in 0.9% NaCl 250 mL chemo infusion, 45 mg/m2 = 90 mg, Intravenous, Clinic/HOD 1 time, 6 of 7 cycles  Administration: 96 mg (5/12/2025), 90 mg (5/19/2025), 90 mg (5/26/2025), 90 mg (6/2/2025), 90 mg (6/9/2025), 96 mg (6/16/2025)         Past Medical History:   Past Medical History:   Diagnosis Date    Acute pancreatitis     Anticoagulant long-term use     Back pain     CAD (coronary artery disease)     CHF (congestive heart failure)     COPD (chronic obstructive pulmonary disease)     Depression     Diverticulosis     Encounter for blood transfusion     GERD (gastroesophageal reflux disease)     History of blood clots     1986 AFTER BOWEL RESCETION    History of bowel resection     Hyperlipidemia     Hypertension     Malignant neoplasm of unspecified part of unspecified bronchus or lung     Peritonitis     PONV (postoperative nausea and vomiting)     Pulmonary nodule     Seizures     HAD A SEIZURE FROM PHENERGAN     Stroke     left sided weakness    Thyroid disease     TIA (transient ischemic attack)     Wears dentures     upper       Past Surgical HIstory:   Past Surgical History:   Procedure Laterality Date    ADRENAL GLAND SURGERY      APPENDECTOMY      BACK SURGERY      CAUDAL EPIDURAL STEROID INJECTION N/A 03/01/2021    Procedure: Injection-steroid-epidural-caudal;  Surgeon: Socorro Lorenz MD;  Location: [facility] OR;  Service: Pain Management;  Laterality: N/A;    CHOLECYSTECTOMY      COLONOSCOPY      COLONOSCOPY N/A 06/10/2021    Procedure: COLONOSCOPY;  Surgeon: Sheree  SHARDA Metz MD;  Location: [facility] ENDO;  Service: Endoscopy;  Laterality: N/A;    COLONOSCOPY N/A 03/08/2022    Procedure: COLONOSCOPY;  Surgeon: Maurilio Rodriguez MD;  Location: [facility] (2ND FLR);  Service: Endoscopy;  Laterality: N/A;  Pt to perform at-home COVID test morning of procedure-DS  2/24-Blood thinner approval [facility] Dr. Walsh (see letters tab 2/24/22)-DS  3/2-Instructions sent via email-DS  3/7-Pt unable to come earlier due to ride-DS    CORONARY STENT PLACEMENT      CYSTOURETHROSCOPY N/A 11/13/2019    Procedure: CYSTOURETHROSCOPY;  Surgeon: Shun Nuñez MD;  Location: [facility] OR;  Service: Urology;  Laterality: N/A;    DIRECT DIAGNOSTIC LARYNGOSCOPY WITH BRONCHOSCOPY AND ESOPHAGOSCOPY N/A 01/03/2024    Procedure: LARYNGOSCOPY, DIRECT, DIAGNOSTIC, WITH BRONCHOSCOPY AND ESOPHAGOSCOPY;  Surgeon: Mir Belle MD;  Location: [facility] OR;  Service: ENT;  Laterality: N/A;    ENDOBRONCHIAL ULTRASOUND Bilateral 2/20/2025    Procedure: ENDOBRONCHIAL ULTRASOUND (EBUS);  Surgeon: Ismael Cooper MD;  Location: [facility] OR;  Service: Pulmonary;  Laterality: Bilateral;    ENDOSCOPIC ULTRASOUND OF LOWER GASTROINTESTINAL TRACT N/A 03/08/2022    Procedure: ULTRASOUND, LOWER GI TRACT, ENDOSCOPIC;  Surgeon: Maurilio Rodriguez MD;  Location: [facility] ([address] [place] [address]);  Service: Endoscopy;  Laterality: N/A;  Pt to perform at-home COVID test morning of procedure-DS  2/24-Blood thinner approval [facility] Dr. Walsh (see letters tab 2/24/22)-DS  3/2-Instructions sent via email-DS  3/7-Pt unable to come earlier due to ride-DS    ENDOSCOPIC ULTRASOUND OF UPPER GASTROINTESTINAL TRACT N/A 11/25/2019    Procedure: ULTRASOUND, UPPER GI TRACT, ENDOSCOPIC;  Surgeon: Alhaji Bridges MD;  Location: [facility] (2ND [facility]);  Service: Endoscopy;  Laterality: N/A;  5 day hold Plavix, Dr Jovanni Serrano - pg  PM prep    ENDOSCOPIC ULTRASOUND OF UPPER GASTROINTESTINAL TRACT N/A 11/02/2020    Procedure: ULTRASOUND, UPPER GI TRACT, ENDOSCOPIC;  Surgeon: Maurilio  RO Rodriguez MD;  Location: [facility] ENDO (2ND FLR);  Service: Endoscopy;  Laterality: N/A;  5 day hold Plavix, Dr Roman Walsh - pg  Covid-19 test 10/30/20 at [facility]    EPIDURAL STEROID INJECTION INTO CERVICAL SPINE N/A 12/08/2021    Procedure: Injection-steroid-epidural-cervical;  Surgeon: Socorro Lorenz MD;  Location: [facility] OR;  Service: Pain Management;  Laterality: N/A;    EPIDURAL STEROID INJECTION INTO CERVICAL SPINE N/A 11/30/2023    Procedure: Injection-steroid-epidural-cervical;  Surgeon: Maurilio Carroll MD;  Location: [facility] OR;  Service: Anesthesiology;  Laterality: N/A;  c7-T1    ESOPHAGOGASTRODUODENOSCOPY N/A 06/10/2021    Procedure: EGD (ESOPHAGOGASTRODUODENOSCOPY);  Surgeon: Sheree Metz MD;  Location: [facility] ENDO;  Service: Endoscopy;  Laterality: N/A;    ESOPHAGOGASTRODUODENOSCOPY N/A 09/26/2024    Procedure: EGD (ESOPHAGOGASTRODUODENOSCOPY);  Surgeon: Jovanny Saldivar MD;  Location: [facility];  Service: Endoscopy;  Laterality: N/A;    ESOPHAGOSCOPY, USING BOUGIE, WITH DILATION N/A 01/03/2024    Procedure: ESOPHAGOSCOPY, USING BOUGIE, WITH DILATION;  Surgeon: Mir Belle MD;  Location: [facility] OR;  Service: ENT;  Laterality: N/A;    FRACTURE SURGERY Left 05/02/2022    ankle    HERNIA REPAIR      HYSTERECTOMY      INCISIONAL HERNIA REPAIR      INJECTION OF ANESTHETIC AGENT AROUND NERVE Right 05/27/2019    Procedure: RIGHT L5-S3 MEDIAL BRANCH BLOCKS;  Surgeon: Sneha Aragon MD;  Location: [facility] OR;  Service: Pain Management;  Laterality: Right;  **DO NOT STOP PLAVIX**    INJECTION OF JOINT Right 09/22/2021    Procedure: Injection, Joint; Right SI Joint Injection;  Surgeon: Socorro Lorenz MD;  Location: [facility] OR;  Service: Pain Management;  Laterality: Right;  Oral    INSERTION OF DORSAL COLUMN NERVE STIMULATOR FOR TRIAL N/A 06/07/2021    Procedure: INSERTION, NEUROSTIMULATOR, SPINAL CORD, DORSAL COLUMN, FOR TRIAL;  Surgeon: Socorro Lorenz MD;  Location: [facility] OR;  Service: Pain  Management;  Laterality: N/A;  nevro rep confirmed start time    INSERTION OF TUNNELED CENTRAL VENOUS CATHETER (CVC) WITH SUBCUTANEOUS PORT N/A 5/7/2025    Procedure: [id number];  Surgeon: Janet Jarrett MD;  Location: [facility] OR;  Service: General;  Laterality: N/A;    instestine      bowel section    KNEE ARTHROPLASTY Right 12/18/2019    Procedure: ARTHROPLASTY, KNEE;  Surgeon: Ike Briceño II, MD;  Location: [facility] OR;  Service: Orthopedics;  Laterality: Right;    KNEE SURGERY  1983    OOPHORECTOMY      PARATHYROID GLAND SURGERY      3 surgeries    RADIOFREQUENCY ABLATION Right 06/10/2019    Procedure: Radiofrequency Ablation - RIGHT L3-5 RADIOFREQUENCY ABLATION WITH HALYARD COOLIEF THERMAL SYSTEM;  Surgeon: Sneha Aragon MD;  Location: [facility] OR;  Service: Pain Management;  Laterality: Right;  **HOLD PLAVIX x 7 DAYS PRIOR**    REVISION OF KNEE ARTHROPLASTY Right     AVALA with Dr Singh    ROBOTIC BRONCHOSCOPY Bilateral 2/20/2025    Procedure: ROBOTIC BRONCHOSCOPY;  Surgeon: Ismael Cooper MD;  Location: [facility] OR;  Service: Pulmonary;  Laterality: Bilateral;    SPINAL CORD STIMULATOR REMOVAL N/A 02/21/2024    Procedure: REMOVAL, NEUROSTIMULATOR, SPINAL;  Surgeon: Maurilio Carroll MD;  Location: [facility] OR;  Service: Pain Management;  Laterality: N/A;  removal of spinal cord stimulator    UPPER GASTROINTESTINAL ENDOSCOPY         Family History:   Family History   Problem Relation Name Age of Onset    Stroke Maternal Grandmother      Cancer Maternal Grandfather      Stroke Mother      Heart disease Father      Breast cancer Sister         Social History:  reports that she quit smoking about 2 years ago. Her smoking use included cigarettes. She started smoking about 48 years ago. She has a 46 pack-year smoking history. She has never used smokeless tobacco. She reports that she does not currently use alcohol. She reports current drug use.    Allergies:  Review of patient's allergies indicates:   Allergen  "Reactions    Aspirin Other (See Comments)     "Makes stomach feel like it's on fire"    Phenergan [promethazine] Other (See Comments)     SEIZURES    Nickel     Lortab [hydrocodone-acetaminophen] Itching     Able to take generic Norco       Medications:  Current Medications[1]    Review of Systems   Constitutional:  Positive for fatigue. Negative for fever.   HENT:  Positive for nosebleeds.    Respiratory:  Negative for shortness of breath.    Cardiovascular:  Positive for chest pain (Chronic with radiation treatment).   Gastrointestinal:  Positive for diarrhea and nausea.   Genitourinary:  Positive for difficulty urinating.   Musculoskeletal:  Positive for back pain (chronic).   Skin: Negative.    Neurological:  Positive for light-headedness and headaches.   Psychiatric/Behavioral: Negative.         ECOG Performance Status:   ECOG SCORE             Objective:      Vitals:   Vitals:    06/24/25 1300   BP: 134/79   BP Location: Right arm   Patient Position: Sitting   Pulse: 89   Resp: 18   Temp: 97.8 °F (36.6 °C)   TempSrc: Temporal   SpO2: 97%   Weight: 91.7 kg (202 lb 2.6 oz)   Height: 5' 7" (1.702 m)     BMI: Body mass index is 31.66 kg/m².    Physical Exam  HENT:      Head: Normocephalic.      Mouth/Throat:      Mouth: Mucous membranes are moist.      Pharynx: Oropharynx is clear.   Cardiovascular:      Rate and Rhythm: Normal rate and regular rhythm.      Heart sounds: Normal heart sounds.   Pulmonary:      Breath sounds: Normal breath sounds.   Abdominal:      General: Bowel sounds are normal.   Musculoskeletal:         General: Normal range of motion.      Right lower leg: Edema present.      Left lower leg: Edema present.   Skin:     General: Skin is warm and dry.   Neurological:      Mental Status: She is alert and oriented to person, place, and time.   Psychiatric:         Mood and Affect: Mood normal.         Behavior: Behavior normal.         Laboratory Data:  Recent Results (from the past 24 hours)   CBC " Oncology    Collection Time: 06/24/25 11:58 AM   Result Value Ref Range    WBC 3.60 (L) 3.90 - 12.70 K/uL    RBC 3.09 (L) 4.00 - 5.40 M/uL    HGB 9.6 (L) 12.0 - 16.0 gm/dL    HCT 28.7 (L) 37.0 - 48.5 %    MCV 93 82 - 98 fL    MCH 31.1 (H) 27.0 - 31.0 pg    MCHC 33.4 32.0 - 36.0 g/dL    RDW 17.7 (H) 11.5 - 14.5 %    Platelet Count 140 (L) 150 - 450 K/uL    MPV 9.4 9.2 - 12.9 fL    Neut # 2.61 1.8 - 7.7 K/uL    Imm Grans # 0.03 0.00 - 0.04 K/uL   CMP    Collection Time: 06/24/25 11:58 AM   Result Value Ref Range    Sodium 139 136 - 145 mmol/L    Potassium 4.3 3.5 - 5.1 mmol/L    Chloride 104 95 - 110 mmol/L    CO2 25 23 - 29 mmol/L    Glucose 117 (H) 70 - 110 mg/dL    BUN 18 8 - 23 mg/dL    Creatinine 0.9 0.5 - 1.4 mg/dL    Calcium 10.5 8.7 - 10.5 mg/dL    Protein Total 7.1 6.0 - 8.4 gm/dL    Albumin 3.9 3.5 - 5.2 g/dL    Bilirubin Total 0.3 0.1 - 1.0 mg/dL    ALP 82 40 - 150 unit/L    AST 15 11 - 45 unit/L    ALT 14 10 - 44 unit/L    Anion Gap 10 8 - 16 mmol/L    eGFR >60 >60 mL/min/1.73/m2   Magnesium    Collection Time: 06/24/25 11:58 AM   Result Value Ref Range    Magnesium  1.4 (L) 1.6 - 2.6 mg/dL          Imaging: Reviewed   Assessment:       1. Dehydration    2. Diarrhea, unspecified type           Plan:     Dehydration  -     Cancel: atropine injection 0.4 mg    Diarrhea, unspecified type    Other orders  -     Cancel: magnesium sulfate 2g in water 50mL IVPB (premix)  -     Cancel: sodium chloride 0.9% bolus 1,000 mL 1,000 mL  -     Cancel: sodium chloride 0.9% flush 10 mL  -     heparin, porcine (PF) 100 unit/mL injection flush 500 Units  -     Cancel: magnesium sulfate in dextrose IVPB (premix) 1 g      Afebrile, labs stable.   Will administer 1L fluids IV with 1g Mag sulfate today.   Plan to obtain urine sample today after hydration.   Pt advised to take imodium as prescribed.   If unable to obtain urine sample and symptoms not improved, patient has been advised to present to ED for further workup.        Med Onc Chart Routing      Follow up with physician 1 week. with repeat cbc, cmp, mag   Follow up with [facility]    Infusion scheduling note    Injection scheduling note    Labs    Imaging    Pharmacy appointment    Other referrals                Plan was discussed with the patient at length, and she verbalized understanding. Alessia was given an opportunity to ask questions that were answered to her satisfaction, and she was advised to call in the interval if any problems or questions arise.    Assessment/Plan reviewed and approved by Dr Garay    40 minutes were spent in coordination of patient's care, record review and counseling.    LISA Piedra, FNP-C  Hematology & Oncology         [1]   Current Outpatient Medications   Medication Sig Dispense Refill    acetaminophen (TYLENOL) 325 MG tablet Take 2 tablets (650 mg total) by mouth every 6 (six) hours as needed for Pain (Do not take with any other tylenol containing products).  0    allopurinoL (ZYLOPRIM) 300 MG tablet TAKE 1 TABLET DAILY 90 tablet 1    amLODIPine (NORVASC) 5 MG tablet TAKE 1 TABLET(5 MG) BY MOUTH TWICE DAILY 180 tablet 1    busPIRone (BUSPAR) 5 MG Tab Take 1 tablet (5 mg total) by mouth 2 (two) times daily. 180 tablet 3    calcium carbonate (CALCIUM 500) 500 mg calcium (1,250 mg) chewable tablet Take 1 tablet by mouth once daily.      clopidogreL (PLAVIX) 75 mg tablet Take 1 tablet (75 mg total) by mouth once daily. 90 tablet 3    colestipoL (COLESTID) 1 gram Tab TAKE 2 TABLETS ONCE DAILY 180 tablet 1    cyanocobalamin 1,000 mcg/mL injection INJECT 1 ML IN THE MUSCLE EVERY 14 DAYS 10 mL 0    diphenhydrAMINE-aluminum-magnesium hydroxide-simethicone-LIDOcaine viscous HCl 2% Swish and spit 15 mLs every 4 (four) hours as needed. 500 each 3    EScitalopram oxalate (LEXAPRO) 20 MG tablet TAKE 1 TABLET EVERY EVENING 90 tablet 3    fluticasone propionate (FLONASE) 50 mcg/actuation nasal spray 2 sprays (100 mcg total) by Each Nostril route 2 (two) times  daily. 48 g 3    furosemide (LASIX) 20 MG tablet TAKE 1 TABLET(20 MG) BY MOUTH EVERY DAY 90 tablet 3    gabapentin (NEURONTIN) 400 MG capsule Take 1 capsule (400 mg total) by mouth 2 (two) times daily. 180 capsule 3    hydrALAZINE (APRESOLINE) 25 MG tablet Take 1 tablet (25 mg total) by mouth daily as needed (Check BP after taking amlodipine, Lasix, metoprolol.  If it continues to stay greater than 160/100 take hydralazine). 30 tablet 0    levothyroxine (SYNTHROID) 75 MCG tablet TAKE 1 TABLET DAILY 90 tablet 1    LIDOcaine-prilocaine (EMLA) cream Apply topically as needed (Apply 30 minutes prior to port access). 30 g 2    magic mouthwash diphen/antac/lidoc/nysta Take 10 mLs by mouth 4 (four) times daily. 240 mL 1    magnesium oxide (MAG-OX) 400 mg (241.3 mg magnesium) tablet Take 1 tablet (400 mg total) by mouth once daily.      methocarbamoL (ROBAXIN) 750 MG Tab Take 1 tablet (750 mg total) by mouth every 8 (eight) hours as needed (muscle spasm). 270 tablet 3    metoprolol succinate (TOPROL-XL) 50 MG 24 hr tablet Take 1 tablet (50 mg total) by mouth once daily. Pt takes 1/2 tab daily 90 tablet 2    mupirocin (BACTROBAN) 2 % ointment Apply topically once daily.      OLANZapine (ZYPREXA) 5 MG tablet Take 1 tablet (5 mg total) by mouth every evening. On days 1-3 of each chemotherapy cycle. 12 tablet 1    ondansetron (ZOFRAN) 8 MG tablet Take 1 tablet (8 mg total) by mouth every 8 (eight) hours as needed for Nausea. 30 tablet 2    oxyCODONE-acetaminophen (PERCOCET)  mg per tablet Take 1 tablet by mouth every 8 (eight) hours as needed.      oxyCODONE-acetaminophen (PERCOCET) 5-325 mg per tablet Take 1 tablet by mouth every 8 (eight) hours as needed for Pain.      pantoprazole (PROTONIX) 40 MG tablet TAKE 1 TABLET DAILY 90 tablet 3    potassium chloride SA (K-DUR,KLOR-CON M) 10 MEQ tablet Take 1 tablet (10 mEq total) by mouth once daily. 90 tablet 3    rosuvastatin (CRESTOR) 10 MG tablet TAKE 1 TABLET DAILY 90  tablet 3    sucralfate (CARAFATE) 1 gram tablet Take 1 tablet (1 g total) by mouth 4 (four) times daily before meals and nightly. 120 tablet 3    traMADoL (ULTRAM) 50 mg tablet Take 1 tablet (50 mg total) by mouth every 6 (six) hours as needed for Pain. 10 tablet 0    traZODone (DESYREL) 100 MG tablet TAKE 1 TABLET EVERY EVENING 90 tablet 3    triamcinolone acetonide 0.1% (KENALOG) 0.1 % ointment Apply topically 2 (two) times daily.      vit A/vit C/vit E/zinc/copper (PRESERVISION AREDS ORAL) Take 1 capsule by mouth 2 (two) times a day.      vitamin D (VITAMIN D3) 1000 units Tab Take 1,000 Units by mouth 2 (two) times a day.       No current facility-administered medications for this visit.     Facility-Administered Medications Ordered in Other Visits   Medication Dose Route Frequency Provider Last Rate Last Admin    magnesium sulfate in dextrose IVPB (premix) 1 g  1 g Intravenous Once Ruben Pavon NP        oxyCODONE immediate release tablet 5 mg  5 mg Oral Once PRN Paul Nunez MD        sodium chloride 0.9% bolus 1,000 mL 1,000 mL  1,000 mL Intravenous 1 time in Clinic/HOD Ruben Pavon  mL/hr at 06/24/25 1415 1,000 mL at 06/24/25 1415    sodium chloride 0.9% flush 10 mL  10 mL Intravenous PRN Ruben Pavon NP

## 2025-06-23 NOTE — TELEPHONE ENCOUNTER
"Spoke with patient.  She notes since last Wednesday having 5-6 loose stools per day, worse than usual, as she has chronic diarrhea since her colon surgery 38 years ago.    She notes having a nose bleed last time about a month ago---and had one yesterday as well, which lasted on and off for several hours. Dark red clots which were the size of quarters and larger.      She notes drinking plenty Pedialyte and tea yesterday--because of her many loose stools---but she hasn't been able to urinate since yesterday during the day. She went very little today.  No burning noted.        She notes RUQ abdominal pain since Wednesday---just like she has when her chronic pancreatitis flares up. She is taking percocet  every 6-8 hours around the clock. Her pain is front to back.     Patient states "I just don't feel good at all."    Nurse will speak with mac and contact her back with a plan.      Message routed to mac (should I start her off with cbc/cmp/mag and appt with you?)  "

## 2025-06-24 ENCOUNTER — INFUSION (OUTPATIENT)
Dept: INFUSION THERAPY | Facility: HOSPITAL | Age: 70
End: 2025-06-24
Attending: INTERNAL MEDICINE
Payer: MEDICARE

## 2025-06-24 ENCOUNTER — LAB VISIT (OUTPATIENT)
Dept: LAB | Facility: HOSPITAL | Age: 70
End: 2025-06-24
Attending: INTERNAL MEDICINE
Payer: MEDICARE

## 2025-06-24 ENCOUNTER — OFFICE VISIT (OUTPATIENT)
Dept: HEMATOLOGY/ONCOLOGY | Facility: CLINIC | Age: 70
End: 2025-06-24
Payer: MEDICARE

## 2025-06-24 VITALS
HEART RATE: 94 BPM | WEIGHT: 202.19 LBS | HEIGHT: 67 IN | SYSTOLIC BLOOD PRESSURE: 119 MMHG | DIASTOLIC BLOOD PRESSURE: 73 MMHG | TEMPERATURE: 98 F | BODY MASS INDEX: 31.73 KG/M2 | RESPIRATION RATE: 18 BRPM | OXYGEN SATURATION: 97 %

## 2025-06-24 VITALS
RESPIRATION RATE: 18 BRPM | BODY MASS INDEX: 31.73 KG/M2 | HEIGHT: 67 IN | TEMPERATURE: 98 F | HEART RATE: 89 BPM | OXYGEN SATURATION: 97 % | WEIGHT: 202.19 LBS | SYSTOLIC BLOOD PRESSURE: 134 MMHG | DIASTOLIC BLOOD PRESSURE: 79 MMHG

## 2025-06-24 DIAGNOSIS — K58.0 IRRITABLE BOWEL SYNDROME WITH DIARRHEA: ICD-10-CM

## 2025-06-24 DIAGNOSIS — E86.0 DEHYDRATION: Primary | ICD-10-CM

## 2025-06-24 DIAGNOSIS — C34.31 CANCER OF LOWER LOBE OF RIGHT LUNG: ICD-10-CM

## 2025-06-24 DIAGNOSIS — R19.7 DIARRHEA, UNSPECIFIED TYPE: ICD-10-CM

## 2025-06-24 LAB
ABSOLUTE NEUTROPHIL COUNT (OHS): 2.61 K/UL (ref 1.8–7.7)
ALBUMIN SERPL BCP-MCNC: 3.9 G/DL (ref 3.5–5.2)
ALP SERPL-CCNC: 82 UNIT/L (ref 40–150)
ALT SERPL W/O P-5'-P-CCNC: 14 UNIT/L (ref 10–44)
ANION GAP (OHS): 10 MMOL/L (ref 8–16)
AST SERPL-CCNC: 15 UNIT/L (ref 11–45)
BACTERIA #/AREA URNS HPF: NORMAL /HPF
BILIRUB SERPL-MCNC: 0.3 MG/DL (ref 0.1–1)
BILIRUB UR QL STRIP.AUTO: NEGATIVE
BUN SERPL-MCNC: 18 MG/DL (ref 8–23)
CALCIUM SERPL-MCNC: 10.5 MG/DL (ref 8.7–10.5)
CHLORIDE SERPL-SCNC: 104 MMOL/L (ref 95–110)
CLARITY UR: CLEAR
CO2 SERPL-SCNC: 25 MMOL/L (ref 23–29)
COLOR UR AUTO: YELLOW
CREAT SERPL-MCNC: 0.9 MG/DL (ref 0.5–1.4)
ERYTHROCYTE [DISTWIDTH] IN BLOOD BY AUTOMATED COUNT: 17.7 % (ref 11.5–14.5)
GFR SERPLBLD CREATININE-BSD FMLA CKD-EPI: >60 ML/MIN/1.73/M2
GLUCOSE SERPL-MCNC: 117 MG/DL (ref 70–110)
GLUCOSE UR QL STRIP: NEGATIVE
HCT VFR BLD AUTO: 28.7 % (ref 37–48.5)
HGB BLD-MCNC: 9.6 GM/DL (ref 12–16)
HGB UR QL STRIP: NEGATIVE
IMM GRANULOCYTES # BLD AUTO: 0.03 K/UL (ref 0–0.04)
KETONES UR QL STRIP: NEGATIVE
LEUKOCYTE ESTERASE UR QL STRIP: ABNORMAL
MAGNESIUM SERPL-MCNC: 1.4 MG/DL (ref 1.6–2.6)
MCH RBC QN AUTO: 31.1 PG (ref 27–31)
MCHC RBC AUTO-ENTMCNC: 33.4 G/DL (ref 32–36)
MCV RBC AUTO: 93 FL (ref 82–98)
MICROSCOPIC COMMENT: NORMAL
NITRITE UR QL STRIP: NEGATIVE
PH UR STRIP: 6 [PH]
PLATELET # BLD AUTO: 140 K/UL (ref 150–450)
PMV BLD AUTO: 9.4 FL (ref 9.2–12.9)
POTASSIUM SERPL-SCNC: 4.3 MMOL/L (ref 3.5–5.1)
PROT SERPL-MCNC: 7.1 GM/DL (ref 6–8.4)
PROT UR QL STRIP: NEGATIVE
RBC # BLD AUTO: 3.09 M/UL (ref 4–5.4)
RBC #/AREA URNS HPF: 3 /HPF (ref 0–4)
SODIUM SERPL-SCNC: 139 MMOL/L (ref 136–145)
SP GR UR STRIP: <=1.005
SQUAMOUS #/AREA URNS HPF: 2 /HPF
WBC # BLD AUTO: 3.6 K/UL (ref 3.9–12.7)
WBC #/AREA URNS HPF: 2 /HPF (ref 0–5)

## 2025-06-24 PROCEDURE — 96365 THER/PROPH/DIAG IV INF INIT: CPT | Mod: PN

## 2025-06-24 PROCEDURE — 96375 TX/PRO/DX INJ NEW DRUG ADDON: CPT | Mod: PN

## 2025-06-24 PROCEDURE — 99999 PR PBB SHADOW E&M-EST. PATIENT-LVL V: CPT | Mod: PBBFAC,,,

## 2025-06-24 PROCEDURE — 25000003 PHARM REV CODE 250: Mod: PN

## 2025-06-24 PROCEDURE — 83735 ASSAY OF MAGNESIUM: CPT | Mod: PN

## 2025-06-24 PROCEDURE — 36415 COLL VENOUS BLD VENIPUNCTURE: CPT | Mod: PN

## 2025-06-24 PROCEDURE — 85027 COMPLETE CBC AUTOMATED: CPT | Mod: PN

## 2025-06-24 PROCEDURE — 99215 OFFICE O/P EST HI 40 MIN: CPT | Mod: S$PBB,,,

## 2025-06-24 PROCEDURE — 96361 HYDRATE IV INFUSION ADD-ON: CPT | Mod: PN

## 2025-06-24 PROCEDURE — 80053 COMPREHEN METABOLIC PANEL: CPT | Mod: PN

## 2025-06-24 PROCEDURE — 99215 OFFICE O/P EST HI 40 MIN: CPT | Mod: PBBFAC,PN,25

## 2025-06-24 PROCEDURE — 81003 URINALYSIS AUTO W/O SCOPE: CPT | Mod: PN

## 2025-06-24 PROCEDURE — 63600175 PHARM REV CODE 636 W HCPCS: Mod: PN

## 2025-06-24 RX ORDER — ATROPINE SULFATE 0.4 MG/ML
0.4 INJECTION, SOLUTION ENDOTRACHEAL; INTRAMEDULLARY; INTRAMUSCULAR; INTRAVENOUS; SUBCUTANEOUS
Status: CANCELLED
Start: 2025-06-24

## 2025-06-24 RX ORDER — HEPARIN 100 UNIT/ML
500 SYRINGE INTRAVENOUS
OUTPATIENT
Start: 2025-06-24

## 2025-06-24 RX ORDER — MAGNESIUM SULFATE HEPTAHYDRATE 40 MG/ML
2 INJECTION, SOLUTION INTRAVENOUS ONCE
Status: CANCELLED
Start: 2025-06-24

## 2025-06-24 RX ORDER — SODIUM CHLORIDE 0.9 % (FLUSH) 0.9 %
10 SYRINGE (ML) INJECTION
OUTPATIENT
Start: 2025-06-24

## 2025-06-24 RX ORDER — MAGNESIUM SULFATE 1 G/100ML
1 INJECTION INTRAVENOUS ONCE
Status: COMPLETED | OUTPATIENT
Start: 2025-06-24 | End: 2025-06-24

## 2025-06-24 RX ORDER — HEPARIN 100 UNIT/ML
500 SYRINGE INTRAVENOUS
Status: CANCELLED | OUTPATIENT
Start: 2025-06-24

## 2025-06-24 RX ORDER — SODIUM CHLORIDE 0.9 % (FLUSH) 0.9 %
10 SYRINGE (ML) INJECTION
Status: DISCONTINUED | OUTPATIENT
Start: 2025-06-24 | End: 2025-06-24 | Stop reason: HOSPADM

## 2025-06-24 RX ORDER — MAGNESIUM SULFATE 1 G/100ML
1 INJECTION INTRAVENOUS ONCE
Status: CANCELLED
Start: 2025-06-24

## 2025-06-24 RX ORDER — SODIUM CHLORIDE 0.9 % (FLUSH) 0.9 %
10 SYRINGE (ML) INJECTION
Status: CANCELLED | OUTPATIENT
Start: 2025-06-24

## 2025-06-24 RX ORDER — MAGNESIUM SULFATE 1 G/100ML
1 INJECTION INTRAVENOUS ONCE
Status: CANCELLED
Start: 2025-06-24 | End: 2025-06-24

## 2025-06-24 RX ORDER — ATROPINE SULFATE 0.4 MG/ML
0.4 INJECTION, SOLUTION ENDOTRACHEAL; INTRAMEDULLARY; INTRAMUSCULAR; INTRAVENOUS; SUBCUTANEOUS
Status: COMPLETED | OUTPATIENT
Start: 2025-06-24 | End: 2025-06-24

## 2025-06-24 RX ADMIN — SODIUM CHLORIDE 1000 ML: 9 INJECTION, SOLUTION INTRAVENOUS at 02:06

## 2025-06-24 RX ADMIN — ATROPINE SULFATE 0.4 MG: 0.4 INJECTION, SOLUTION INTRAVENOUS at 02:06

## 2025-06-24 RX ADMIN — MAGNESIUM SULFATE HEPTAHYDRATE 1 G: 1 INJECTION, SOLUTION INTRAVENOUS at 02:06

## 2025-06-24 NOTE — PLAN OF CARE
.Pt tolerated IVF/1g mag infusion well.  No adverse reaction noted.   Port flushed with NS and saline lock per protocol.Pt verbalized still  feeling very weak and fatigue after completing 1L of NS and 1g of mag. Ruben NP was notified. Ruben at chair side and advised patient to be transferred  to ED through ambulance for further evaluation. Pt verbally agreed and she left clinic in no acute distress in stretcher through [facility].

## 2025-06-25 ENCOUNTER — TELEPHONE (OUTPATIENT)
Dept: PSYCHIATRY | Facility: CLINIC | Age: 70
End: 2025-06-25
Payer: MEDICARE

## 2025-06-25 NOTE — TELEPHONE ENCOUNTER
----- Message from Geoffrey Quesada sent at 6/25/2025  9:45 AM CDT -----  CRM sent. Pt is requesting to cancel appt for today. Pt wants to call to reschedule at a later date.

## 2025-06-26 ENCOUNTER — DOCUMENTATION ONLY (OUTPATIENT)
Dept: RADIATION ONCOLOGY | Facility: CLINIC | Age: 70
End: 2025-06-26
Payer: MEDICARE

## 2025-06-26 DIAGNOSIS — C34.31 CANCER OF LOWER LOBE OF RIGHT LUNG: Primary | ICD-10-CM

## 2025-06-26 RX ORDER — ESCITALOPRAM OXALATE 20 MG/1
20 TABLET ORAL NIGHTLY
Qty: 90 TABLET | Refills: 1 | Status: SHIPPED | OUTPATIENT
Start: 2025-06-26

## 2025-06-26 NOTE — TELEPHONE ENCOUNTER
Refill Decision Note   Alessia Nelson  is requesting a refill authorization.  Brief Assessment and Rationale for Refill:  Approve     Medication Therapy Plan:  06/24/25 EDv dpcs reviewed. No change to requested med      Extended chart review required: Yes   Comments:     Note composed:6:07 PM 06/26/2025

## 2025-06-26 NOTE — TELEPHONE ENCOUNTER
No care due was identified.  [facility] Embedded Care Due Messages. Reference number: [id number].   6/25/2025 11:32:33 PM CDT

## 2025-07-01 ENCOUNTER — HOSPITAL ENCOUNTER (OUTPATIENT)
Dept: RADIATION THERAPY | Facility: HOSPITAL | Age: 70
Discharge: HOME | End: 2025-07-01
Attending: RADIOLOGY
Payer: MEDICARE

## 2025-07-01 ENCOUNTER — OFFICE VISIT (OUTPATIENT)
Dept: PSYCHIATRY | Facility: CLINIC | Age: 70
End: 2025-07-01
Payer: MEDICARE

## 2025-07-01 DIAGNOSIS — F43.22 ADJUSTMENT DISORDER WITH ANXIOUS MOOD: Primary | ICD-10-CM

## 2025-07-01 DIAGNOSIS — C34.90 MALIGNANT NEOPLASM OF UNSPECIFIED PART OF UNSPECIFIED BRONCHUS OR LUNG: ICD-10-CM

## 2025-07-02 ENCOUNTER — HOSPITAL ENCOUNTER (OUTPATIENT)
Dept: RADIOLOGY | Facility: HOSPITAL | Age: 70
Discharge: HOME | End: 2025-07-02
Attending: INTERNAL MEDICINE
Payer: MEDICARE

## 2025-07-02 ENCOUNTER — DOCUMENTATION ONLY (OUTPATIENT)
Dept: INFUSION THERAPY | Facility: HOSPITAL | Age: 70
End: 2025-07-02
Payer: MEDICARE

## 2025-07-02 ENCOUNTER — DOCUMENTATION ONLY (OUTPATIENT)
Dept: RADIATION ONCOLOGY | Facility: CLINIC | Age: 70
End: 2025-07-02
Payer: MEDICARE

## 2025-07-02 DIAGNOSIS — C34.90 MALIGNANT NEOPLASM OF UNSPECIFIED PART OF UNSPECIFIED BRONCHUS OR LUNG: ICD-10-CM

## 2025-07-02 PROCEDURE — 71260 CT THORAX DX C+: CPT | Mod: TC,PO

## 2025-07-02 PROCEDURE — 25500020 PHARM REV CODE 255: Mod: PO | Performed by: INTERNAL MEDICINE

## 2025-07-02 PROCEDURE — 71260 CT THORAX DX C+: CPT | Mod: 26,,, | Performed by: STUDENT IN AN ORGANIZED HEALTH CARE EDUCATION/TRAINING PROGRAM

## 2025-07-02 RX ADMIN — IOHEXOL 75 ML: 350 INJECTION, SOLUTION INTRAVENOUS at 09:07

## 2025-07-02 NOTE — PROGRESS NOTES
PSYCHO-ONCOLOGY NOTE/ Individual Psychotherapy     Date: 7/1/2025   Site:  [facility]      Therapeutic Intervention: Met with patient.  Outpatient - Insight oriented psychotherapy 60 min - CPT code 68345 and Outpatient - Supportive psychotherapy 60 min - CPT Code 26970    This includes face to face time and non-face to face time preparing to see the patient, obtaining and/or reviewing separately obtained history, documenting clinical information in the electronic or other health record, independently interpreting results and communicating results to the patient/family/caregiver, or care coordinator.      Patient was last seen by me on 6/4/2025    Problem list  Patient Active Problem List   Diagnosis    CHF, chronic    Hypertension    COPD (chronic obstructive pulmonary disease)    Nicotine dependence    Diarrhea, chronic    Allergic rhinitis    Mild episode of recurrent major depressive disorder    Fibromyalgia    GERD (gastroesophageal reflux disease)    Hypothyroidism    Hyperlipidemia    History of malignant neoplasm of skin    MEN (multiple endocrine neoplasia)    Peripheral neuropathy    Poikiloderma    Pseudophakia    Rotator cuff injury    Tendinosis    Metatarsalgia    Dry eyes    Class 1 obesity due to excess calories with serious comorbidity and body mass index (BMI) of 30.0 to 30.9 in adult, today 29.1    Chronic bilateral low back pain without sciatica    Contusion of tail of pancreas    Bile duct abnormality    Gastritis    Lumbosacral spondylosis    S/P drug eluting coronary stent placement, 8/2017    Family history of premature CAD    Syncope and collapse, onset 2015, about 10 times, one episode noted hypotension at doctor office 9/2017    Multiple falls, started 9/2018, 5 times usually on standing    Excessive caffeine intake    Orthostatic hypotension    Abdominal obesity    Aspirin intolerance    LVH (left ventricular hypertrophy) due to hypertensive disease, with heart failure    Chronic  diarrhea    History of cholecystectomy    Diverticulosis of large intestine without hemorrhage    Abnormal computerized tomography of biliary tract    History of pancreatitis    Primary osteoarthritis of right knee    Degenerative tear of medial meniscus of right knee    Baker's cyst of knee, right    Chronic pain of right knee    Unilateral primary osteoarthritis, right knee    CAD (coronary artery disease)    History of TIA (transient ischemic attack)    Status post right knee replacement    Normocytic anemia    Bradycardia    Chronic pancreatitis    Colon cancer screening    B12 deficiency    History of Clostridium difficile infection    Abnormal finding on GI tract imaging    Pancreatic insufficiency    Degenerative disc disease, lumbar    Current smoker    Failed back syndrome of lumbar spine    Activity intolerance    Sacroiliitis    Hypothyroidism due to acquired atrophy of thyroid    Pain of lumbar facet joint    Non-refractory chronic migraine without aura    Lumbar radiculopathy    Isolated cervical dystonia    Gout    Generalized anxiety disorder    Stage 3b chronic kidney disease    Pharyngoesophageal dysphagia    Hoarseness, persistent    Chronic bronchitis    Bilateral carpal tunnel syndrome    Major depressive disorder, single episode, moderate    Aortic atherosclerosis    Impaired functional mobility and activity tolerance    Myofascial pain syndrome of lumbar spine    Myofascial pain syndrome, cervical    Solitary pulmonary nodule    Irritable bowel syndrome with diarrhea    Cancer of lower lobe of right lung    Malignant neoplasm of unspecified part of unspecified bronchus or lung    Rash    Chemotherapy-induced fatigue    Other low back pain    Mucositis due to chemotherapy    Immunodeficiency due to chemotherapy    Neutropenia    Epistaxis    Cancer related pain    Pancytopenia    Dehydration       Chief complaint/reason for encounter: anxiety and interpersonal   Met with patient to evaluate  psychosocial adaptation to diagnosis/treatment/survivorship of lung cancer.      Current Medications  Current Outpatient Medications   Medication    acetaminophen (TYLENOL) 325 MG tablet    allopurinoL (ZYLOPRIM) 300 MG tablet    amLODIPine (NORVASC) 5 MG tablet    busPIRone (BUSPAR) 5 MG Tab    calcium carbonate (CALCIUM 500) 500 mg calcium (1,250 mg) chewable tablet    clopidogreL (PLAVIX) 75 mg tablet    colestipoL (COLESTID) 1 gram Tab    cyanocobalamin 1,000 mcg/mL injection    diphenhydrAMINE-aluminum-magnesium hydroxide-simethicone-LIDOcaine viscous HCl 2%    EScitalopram oxalate (LEXAPRO) 20 MG tablet    fluticasone propionate (FLONASE) 50 mcg/actuation nasal spray    furosemide (LASIX) 20 MG tablet    gabapentin (NEURONTIN) 400 MG capsule    hydrALAZINE (APRESOLINE) 25 MG tablet    levothyroxine (SYNTHROID) 75 MCG tablet    LIDOcaine-prilocaine (EMLA) cream    magic mouthwash diphen/antac/lidoc/nysta    magnesium oxide (MAG-OX) 400 mg (241.3 mg magnesium) tablet    methocarbamoL (ROBAXIN) 750 MG Tab    metoprolol succinate (TOPROL-XL) 50 MG 24 hr tablet    mupirocin (BACTROBAN) 2 % ointment    OLANZapine (ZYPREXA) 5 MG tablet    ondansetron (ZOFRAN) 8 MG tablet    oxyCODONE-acetaminophen (PERCOCET)  mg per tablet    oxyCODONE-acetaminophen (PERCOCET) 5-325 mg per tablet    pantoprazole (PROTONIX) 40 MG tablet    potassium chloride SA (K-DUR,KLOR-CON M) 10 MEQ tablet    rosuvastatin (CRESTOR) 10 MG tablet    sucralfate (CARAFATE) 1 gram tablet    traMADoL (ULTRAM) 50 mg tablet    traZODone (DESYREL) 100 MG tablet    triamcinolone acetonide 0.1% (KENALOG) 0.1 % ointment    vit A/vit C/vit E/zinc/copper (PRESERVISION AREDS ORAL)    vitamin D (VITAMIN D3) 1000 units Tab     No current facility-administered medications for this visit.     Facility-Administered Medications Ordered in Other Visits   Medication Frequency    oxyCODONE immediate release tablet 5 mg Once PRN       ONCOLOGY HISTORY  Oncology  History   Cancer of lower lobe of right lung   3/14/2025 Initial Diagnosis    Cancer of lower lobe of right lung     3/14/2025 Cancer Staged    Staging form: Lung, AJCC V9  - Clinical stage from 3/14/2025: cT1c, cN2(f), cM0     5/12/2025 -  Chemotherapy    Treatment Summary   Plan Name: OP NSCLC PACLitaxel CARBOplatin QW + radiation  Treatment Goal: Curative  Status: Active  Start Date: 5/12/2025  End Date: 6/23/2025 (Planned)  Provider: Sea William MD  Chemotherapy: CARBOplatin (PARAPLATIN) 205 mg in 0.9% NaCl 305.5 mL chemo infusion, 200 mg, Intravenous, Clinic/HOD 1 time, 6 of 7 cycles  Administration: 205 mg (5/12/2025), 200 mg (5/19/2025), 200 mg (5/26/2025), 220 mg (6/2/2025), 220 mg (6/9/2025), 220 mg (6/16/2025)  PACLitaxeL (TAXOL) 45 mg/m2 = 96 mg in 0.9% NaCl 250 mL chemo infusion, 45 mg/m2 = 90 mg, Intravenous, Clinic/HOD 1 time, 6 of 7 cycles  Administration: 96 mg (5/12/2025), 90 mg (5/19/2025), 90 mg (5/26/2025), 90 mg (6/2/2025), 90 mg (6/9/2025), 96 mg (6/16/2025)         Objective:  Alessia Nelson arrived  promptly for the session.   Ms. Nelson was independently ambulatory at the time of session. The patient was fully cooperative throughout the session.  Appearance: age appropriate, casually  dressed, adequately  groomed  Behavior/Cooperation: friendly and cooperative  Speech: normal in rate, volume, and tone and appropriate quality, quantity and organization of sentences  Mood: sad  Affect: mood congruent  Thought Process: goal-directed, logical  Thought Content: normal,  No delusions or paranoia; did not appear to be responding to internal stimuli during the session  Orientation: grossly intact  Memory: grossly intact  Attention Span/Concentration: Attends to session without distraction; reports no difficulty  Fund of Knowledge: average  Estimate of Intelligence: average from verbal skills and history  Cognition: grossly intact  Insight: patient has awareness of illness; good insight  "into own behavior and behavior of others  Judgment: the patient's behavior is adequate to circumstances    [facility]:       6/24/2025     1:02 PM 6/9/2025     9:32 AM 6/2/2025     9:03 AM 5/26/2025     9:55 AM 5/16/2025     9:08 AM 5/8/2025    11:17 AM 5/8/2025    11:16 AM   DISTRESS SCREENING   Distress Score 7 6 4 4 5 0 - No Distress 0 - No Distress   Practical Concerns None of these None of these None of these None of these None of these  None of these   Social Concerns None of these None of these None of these None of these None of these  None of these   Emotional Concerns None of these None of these None of these None of these None of these Worry or anxiety None of these   Spiritual or Anglican Concerns None of these None of these None of these None of these None of these  None of these   Physical Concerns Pain;Fatigue;Changes in eating;Loss or change of physical abilities Pain Fatigue Fatigue;Sleep;Pain Pain;Fatigue  None of these   Other Problems  last monday after chemo patient states she lost her balance and had trouble walking; hair loss concern; having problems with her stomach heavy fatigue from raidiation; still having nose bleeds -- worried it could be clots "I have chronic diarrhea" - she lost 3 lbs. since last thursday; "Fatigue is the biggest thing I'm dealing with"; has sores in her mouth that makes it difficult to eat           Interval history and content of current session: Pt provided update regarding cancer treatment and side effect management. She recently completed chemotherapy and has one remaining radiation appointment, followed by a year of immunotherapy. Pt reported increased side effects, including fatigue, nausea, and headaches. She has been relying on neighbors and family for support but expressed concerns about feeling taken advantage of. Supportive therapeutic techniques were employed to validate her emotional experience and encourage reflection on her treatment " journey. Pt acknowledged the challenges but noted that some aspects were less difficult than anticipated, while expressing worry about upcoming scans. She described adapting to maintain routines but feels frustrated by fatigue. Psychoeducation on fatigue and pacing strategies was provided. ACT-based self-compassion techniques were introduced to help pt manage self-critical thoughts and foster self-kindness.     Risk parameters:   Patient reports no suicidal ideation  Patient reports no homicidal ideation  Patient reports no self-injurious behavior  Patient reports no violent behavior                 Safety needs:  None at this time                            Verbal deficits: None                 Patient's response to intervention:The patient's response to intervention is accepting.                 Progress toward goals and other mental status changes:  The patient's progress toward goals is good.                            Progress to date:Progress as Expected                            Patient Strengths: verbal, intelligent, successful, good insight, commitment to wellness, strong fam     Treatment Plan:individual psychotherapy  Target symptoms: anxiety , adjustment  Why chosen therapy is appropriate versus another modality: relevant to diagnosis  Outcome monitoring methods: self-report, observation, checklist/rating scale  Therapeutic intervention type: insight oriented psychotherapy, behavior modifying psychotherapy, supportive psychotherapy  Prognosis: Good     Return to clinic: 3 weeks     Length of Service (minutes direct face-to-face contact): 60    Diagnosis:     ICD-10-CM ICD-9-CM   1. Adjustment disorder with anxious mood  F43.22 309.24   2. Malignant neoplasm of unspecified part of unspecified bronchus or lung  C34.90 162.9         Tiffanie Soliz PsyD   Clinical Health Psychology Fellow

## 2025-07-02 NOTE — PLAN OF CARE
All treatments complete.  Follow up appointment provided  Pt denies any questions or concerns at this time

## 2025-07-06 NOTE — PROGRESS NOTES
PATIENT: Alessia Nelson  MRN: [id number]  DATE: 7/7/2025      Diagnosis:   1. Cancer of lower lobe of right lung    2. Other specified disorders involving the immune mechanism, not elsewhere classified    3. Immunodeficiency due to chemotherapy    4. MEN (multiple endocrine neoplasia)    5. Pancreatic insufficiency    6. Pancytopenia    7. Arachnoid membrane inflammation    8. Hypothyroidism, unspecified type    9. Hypertension, unspecified type    10. Radiation esophagitis                    Chief Complaint: Lung Cancer      Oncologic History:      Oncologic History     Oncologic Treatment     Pathology           Subjective:    Interval History:  Ms. Nelson is a 69 y.o. female with CAD, CHF, COPD, depression, GERD, HTN, HLD, Seizures, h/o CVA, thyroid disease who presents for NSCLC.  Since the last clinic visit the patient completed radiation on 7/02/25.  CT chest on 07/02/2025 showed small aerated density along the posterior aspect bronchus intermedius; thin bandlike densities in the right lower lobe radiating of pleural margin with minimal surrounding ground-glass density likely related to radiation changes; small stable 4 mm micronodule in the right lower lobe.  The patient endorses soreness in her throat for the last 5 days.  The patient states she is taking Protonix but has not been taking Carafate or magic mouthwash.  The patient denies CP, cough, SOB, abdominal pain, nausea, vomiting, constipation, diarrhea.  The patient denies fever, chills, night sweats, weight loss, new lumps or bumps, easy bruising or bleeding.    Prior History:  The patient was being followed for a nodule in the right lower lobe and had PET-CT on 05/22/2024 which showed no avidity.  CT chest on 11/11/2024 showed enlargement of the ground-glass right lower lobe lung nodule measuring 18 x 19 mm and small bilateral soft tissue pulmonary nodules measuring 3-8 mm.  The patient underwent robotic bronchoscopy and EBUS on 02/20/2025 with  pathology showing squamous cell carcinoma and biopsy of right lower lung nodule and a positive station 7 lymph node.  Stations 4R, 11R S and 11 RI were negative.  TEMPUS tissue testing showed mutations in CDKN2A, FAT1, and TP53.  PD-L1 was <1%.  The patient underwent CT myelogram and MRI of the thoracic spine 03/13/2025 for chronic upper back pain with radiation down bilateral arms.   CT thoracic spine on 03/13/2025 report obtained showing stable expansile mass of the superior thoracic cord extending from T2 through T4 measuring 55 mm in length by 10 mm medial lateral and 9 mm AP.  MRI T-spine on 03/13/2025 showed expansile mass in the thoracic cord concerning for potential ependymoma versus astrocytoma.   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  Dr Staples 4/15/25 felt the patient needed fenestration with neurosurgery.     Past Medical History:   Past Medical History:   Diagnosis Date    Acute pancreatitis     Anticoagulant long-term use     Back pain     CAD (coronary artery disease)     CHF (congestive heart failure)     COPD (chronic obstructive pulmonary disease)     Depression     Diverticulosis     Encounter for blood transfusion     GERD (gastroesophageal reflux disease)     History of blood clots     1986 AFTER BOWEL RESCETION    History of bowel resection     Hyperlipidemia     Hypertension     Malignant neoplasm of unspecified part of unspecified bronchus or lung     Peritonitis     PONV (postoperative nausea and vomiting)     Pulmonary nodule     Seizures     HAD A SEIZURE FROM PHENERGAN     Stroke     left sided weakness    Thyroid disease     TIA (transient ischemic attack)     Wears dentures     upper       Past Surgical HIstory:   Past Surgical History:   Procedure Laterality Date    ADRENAL GLAND SURGERY      APPENDECTOMY       BACK SURGERY      CAUDAL EPIDURAL STEROID INJECTION N/A 03/01/2021    Procedure: Injection-steroid-epidural-caudal;  Surgeon: Socorro Lorenz MD;  Location: [facility] OR;  Service: Pain Management;  Laterality: N/A;    CHOLECYSTECTOMY      COLONOSCOPY      COLONOSCOPY N/A 06/10/2021    Procedure: COLONOSCOPY;  Surgeon: Sheree Metz MD;  Location: [facility] ENDO;  Service: Endoscopy;  Laterality: N/A;    COLONOSCOPY N/A 03/08/2022    Procedure: COLONOSCOPY;  Surgeon: Maurilio Rodriguez MD;  [facility] (2[facility] FLR);  Service: Endoscopy;  Laterality: N/A;  Pt to perform at-home COVID test morning of procedure-DS  2/24-Blood thinner approval rec'[facility] Dr. Walsh (see letters tab 2/24/22)-DS  3/2-Instructions sent via email-DS  3/7-Pt unable to come earlier due to ride-DS    CORONARY STENT PLACEMENT      CYSTOURETHROSCOPY N/A 11/13/2019    Procedure: CYSTOURETHROSCOPY;  Surgeon: Shun Nuñez MD;  Location: [facility] OR;  Service: Urology;  Laterality: N/A;    DIRECT DIAGNOSTIC LARYNGOSCOPY WITH BRONCHOSCOPY AND ESOPHAGOSCOPY N/A 01/03/2024    Procedure: LARYNGOSCOPY, DIRECT, DIAGNOSTIC, WITH BRONCHOSCOPY AND ESOPHAGOSCOPY;  Surgeon: Mir Belle MD;  Location: [facility] OR;  Service: ENT;  Laterality: N/A;    ENDOBRONCHIAL ULTRASOUND Bilateral 2/20/2025    Procedure: ENDOBRONCHIAL ULTRASOUND (EBUS);  Surgeon: Ismael Cooper MD;  Location: [facility] OR;  Service: Pulmonary;  Laterality: Bilateral;    ENDOSCOPIC ULTRASOUND OF LOWER GASTROINTESTINAL TRACT N/A 03/08/2022    Procedure: ULTRASOUND, LOWER GI TRACT, ENDOSCOPIC;  Surgeon: Maurilio Rodriguez MD;  Location: [facility] (2ND FLR);  Service: Endoscopy;  Laterality: N/A;  Pt to perform at-home COVID test morning of procedure-DS  2/24-Blood thinner approval rec'[facility] Dr. Walsh (see letters tab 2/24/22)-DS  3/2-Instructions sent via email-DS  3/7-Pt unable to come earlier due to ride-DS    ENDOSCOPIC ULTRASOUND OF UPPER GASTROINTESTINAL TRACT N/A 11/25/2019    Procedure:  ULTRASOUND, UPPER GI TRACT, ENDOSCOPIC;  Surgeon: Alhaji Bridges MD;  Location: [facility] ENDO (2ND FLR);  Service: Endoscopy;  Laterality: N/A;  5 day hold Plavix, Dr Jovanni Serrano - pg  PM prep    ENDOSCOPIC ULTRASOUND OF UPPER GASTROINTESTINAL TRACT N/A 11/02/2020    Procedure: ULTRASOUND, UPPER GI TRACT, ENDOSCOPIC;  Surgeon: Maurilio Rodriguez MD;  Location: [facility] ENDO (2ND FLR);  Service: Endoscopy;  Laterality: N/A;  5 day hold Plavix, Dr Roman Walsh -   Covid-19 test 10/30/20 at [facility]    EPIDURAL STEROID INJECTION INTO CERVICAL SPINE N/A 12/08/2021    Procedure: Injection-steroid-epidural-cervical;  Surgeon: Socorro Lorenz MD;  Location: [facility] OR;  Service: Pain Management;  Laterality: N/A;    EPIDURAL STEROID INJECTION INTO CERVICAL SPINE N/A 11/30/2023    Procedure: Injection-steroid-epidural-cervical;  Surgeon: Maurilio Carroll MD;  Location: [facility] OR;  Service: Anesthesiology;  Laterality: N/A;  c7-T1    ESOPHAGOGASTRODUODENOSCOPY N/A 06/10/2021    Procedure: EGD (ESOPHAGOGASTRODUODENOSCOPY);  Surgeon: Sheree Metz MD;  Location: [facility];  Service: Endoscopy;  Laterality: N/A;    ESOPHAGOGASTRODUODENOSCOPY N/A 09/26/2024    Procedure: EGD (ESOPHAGOGASTRODUODENOSCOPY);  Surgeon: Jovanny Saldivar MD;  Location: [facility];  Service: Endoscopy;  Laterality: N/A;    ESOPHAGOSCOPY, USING BOUGIE, WITH DILATION N/A 01/03/2024    Procedure: ESOPHAGOSCOPY, USING BOUGIE, WITH DILATION;  Surgeon: Mir Belle MD;  Location: [facility] OR;  Service: ENT;  Laterality: N/A;    FRACTURE SURGERY Left 05/02/2022    ankle    HERNIA REPAIR      HYSTERECTOMY      INCISIONAL HERNIA REPAIR      INJECTION OF ANESTHETIC AGENT AROUND NERVE Right 05/27/2019    Procedure: RIGHT L5-S3 MEDIAL BRANCH BLOCKS;  Surgeon: Sneha Aragon MD;  Location: [facility] OR;  Service: Pain Management;  Laterality: Right;  **DO NOT STOP PLAVIX**    INJECTION OF JOINT Right 09/22/2021    Procedure: Injection, Joint; Right SI Joint Injection;   Surgeon: Socorro Lorenz MD;  Location: [facility] OR;  Service: Pain Management;  Laterality: Right;  Oral    INSERTION OF DORSAL COLUMN NERVE STIMULATOR FOR TRIAL N/A 06/07/2021    Procedure: INSERTION, NEUROSTIMULATOR, SPINAL CORD, DORSAL COLUMN, FOR TRIAL;  Surgeon: Socorro Lorenz MD;  Location: [facility] OR;  Service: Pain Management;  Laterality: N/A;  nevro rep confirmed start time    INSERTION OF TUNNELED CENTRAL VENOUS CATHETER (CVC) WITH SUBCUTANEOUS PORT N/A 5/7/2025    Procedure: [id number];  Surgeon: Janet Jarrett MD;  Location: [facility] OR;  Service: General;  Laterality: N/A;    instestine      bowel section    KNEE ARTHROPLASTY Right 12/18/2019    Procedure: ARTHROPLASTY, KNEE;  Surgeon: Ike Briceño II, MD;  Location: [facility] OR;  Service: Orthopedics;  Laterality: Right;    KNEE SURGERY  1983    OOPHORECTOMY      PARATHYROID GLAND SURGERY      3 surgeries    RADIOFREQUENCY ABLATION Right 06/10/2019    Procedure: Radiofrequency Ablation - RIGHT L3-5 RADIOFREQUENCY ABLATION WITH HALYARD COOLIEF THERMAL SYSTEM;  Surgeon: Sneha Aragon MD;  Location: [facility] OR;  Service: Pain Management;  Laterality: Right;  **HOLD PLAVIX x 7 DAYS PRIOR**    REVISION OF KNEE ARTHROPLASTY Right     AVALA with Dr Singh    ROBOTIC BRONCHOSCOPY Bilateral 2/20/2025    Procedure: ROBOTIC BRONCHOSCOPY;  Surgeon: Ismael Cooper MD;  Location: [facility] OR;  Service: Pulmonary;  Laterality: Bilateral;    SPINAL CORD STIMULATOR REMOVAL N/A 02/21/2024    Procedure: REMOVAL, NEUROSTIMULATOR, SPINAL;  Surgeon: Maurilio Carroll MD;  Location: [facility] OR;  Service: Pain Management;  Laterality: N/A;  removal of spinal cord stimulator    UPPER GASTROINTESTINAL ENDOSCOPY         Family History:   Family History   Problem Relation Name Age of Onset    Stroke Maternal Grandmother      Cancer Maternal Grandfather      Stroke Mother      Heart disease Father      Breast cancer Sister         Social History:  reports that she quit smoking  "about 2 years ago. Her smoking use included cigarettes. She started smoking about 48 years ago. She has a 46 pack-year smoking history. She has never used smokeless tobacco. She reports that she does not currently use alcohol. She reports current drug use.    Allergies:  Review of patient's allergies indicates:   Allergen Reactions    Aspirin Other (See Comments)     "Makes stomach feel like it's on fire"    Phenergan [promethazine] Other (See Comments)     SEIZURES    Nickel     Lortab [hydrocodone-acetaminophen] Itching     Able to take generic Norco       Medications:  Current Medications[1]    Review of Systems   Constitutional:  Negative for chills, fatigue, fever and unexpected weight change.   HENT:  Positive for sore throat.    Respiratory:  Negative for cough and shortness of breath.    Cardiovascular:  Negative for chest pain and palpitations.   Gastrointestinal:  Negative for abdominal pain, constipation, diarrhea, nausea and vomiting.   Skin:  Negative for rash.   Neurological:  Negative for headaches.   Hematological:  Negative for adenopathy. Does not bruise/bleed easily.       ECOG Performance Status: 2   Objective:      Vitals:   Vitals:    07/07/25 1034   BP: 129/77   BP Location: Right arm   Patient Position: Sitting   Pulse: 79   Resp: 16   Temp: 97.4 °F (36.3 °C)   TempSrc: Temporal   SpO2: 97%   Weight: 87.5 kg (192 lb 14.4 oz)   Height: 5' 7" (1.702 m)                 Physical Exam  Constitutional:       General: She is not in acute distress.     Appearance: She is well-developed. She is not diaphoretic.   HENT:      Head: Normocephalic and atraumatic.   Cardiovascular:      Rate and Rhythm: Normal rate and regular rhythm.      Heart sounds: Normal heart sounds. No murmur heard.     No friction rub. No gallop.   Pulmonary:      Effort: Pulmonary effort is normal. No respiratory distress.      Breath sounds: Normal breath sounds. No wheezing or rales.   Chest:      Chest wall: No tenderness. "   Abdominal:      General: Bowel sounds are normal. There is no distension.      Palpations: Abdomen is soft. There is no mass.      Tenderness: There is no abdominal tenderness. There is no guarding or rebound.   Lymphadenopathy:      Cervical: No cervical adenopathy.      Upper Body:      Right upper body: No supraclavicular or axillary adenopathy.      Left upper body: No supraclavicular or axillary adenopathy.   Skin:     Findings: No erythema or rash.   Neurological:      Mental Status: She is alert and oriented to person, place, and time.   Psychiatric:         Behavior: Behavior normal.         Laboratory Data:  Lab Visit on 07/02/2025   Component Date Value Ref Range Status    Sodium 07/02/2025 143  136 - 145 mmol/L Final    Potassium 07/02/2025 4.4  3.5 - 5.1 mmol/L Final    Chloride 07/02/2025 109  95 - 110 mmol/L Final    CO2 07/02/2025 24  23 - 29 mmol/L Final    Glucose 07/02/2025 109  70 - 110 mg/dL Final    BUN 07/02/2025 16  8 - 23 mg/dL Final    Creatinine 07/02/2025 0.8  0.5 - 1.4 mg/dL Final    Calcium 07/02/2025 10.2  8.7 - 10.5 mg/dL Final    Protein Total 07/02/2025 6.5  6.0 - 8.4 gm/dL Final    Albumin 07/02/2025 3.5  3.5 - 5.2 g/dL Final    Bilirubin Total 07/02/2025 0.3  0.1 - 1.0 mg/dL Final    For infants and newborns, interpretation of results should be based   on gestational age, weight and in agreement with clinical   observations.    Premature Infant recommended reference ranges:   0-24 hours:  <8.0 mg/dL   24-48 hours: <12.0 mg/dL   3-5 days:    <15.0 mg/dL   6-29 days:   <15.0 mg/dL    ALP 07/02/2025 81  40 - 150 unit/L Final    AST 07/02/2025 15  11 - 45 unit/L Final    ALT 07/02/2025 12  10 - 44 unit/L Final    Anion Gap 07/02/2025 10  8 - 16 mmol/L Final    eGFR 07/02/2025 >60  >60 mL/min/1.73/m2 Final    Estimated GFR calculated using the CKD-EPI creatinine (2021) equation.    WBC 07/02/2025 3.07 (L)  3.90 - 12.70 K/uL Final    RBC 07/02/2025 3.02 (L)  4.00 - 5.40 M/uL Final     HGB 07/02/2025 9.4 (L)  12.0 - 16.0 gm/dL Final    HCT 07/02/2025 29.8 (L)  37.0 - 48.5 % Final    MCV 07/02/2025 99 (H)  82 - 98 fL Final    MCH 07/02/2025 31.1 (H)  27.0 - 31.0 pg Final    MCHC 07/02/2025 31.5 (L)  32.0 - 36.0 g/dL Final    RDW 07/02/2025 19.9 (H)  11.5 - 14.5 % Final    Platelet Count 07/02/2025 145 (L)  150 - 450 K/uL Final    MPV 07/02/2025 8.8 (L)  9.2 - 12.9 fL Final    Nucleated RBC 07/02/2025 1 (H)  <=0 /100 WBC Final    Neut % 07/02/2025 69.0  38 - 73 % Final    Lymph % 07/02/2025 15.3 (L)  18 - 48 % Final    Mono % 07/02/2025 13.4  4 - 15 % Final    Eos % 07/02/2025 1.3  <=8 % Final    Basophil % 07/02/2025 0.3  <=1.9 % Final    Imm Grans % 07/02/2025 0.7 (H)  0.0 - 0.5 % Final    Neut # 07/02/2025 2.12  1.8 - 7.7 K/uL Final    Lymph # 07/02/2025 0.47 (L)  1 - 4.8 K/uL Final    Mono # 07/02/2025 0.41  0.3 - 1 K/uL Final    Eos # 07/02/2025 0.04  <=0.5 K/uL Final    Baso # 07/02/2025 0.01  <=0.2 K/uL Final    Imm Grans # 07/02/2025 0.02  0.00 - 0.04 K/uL Final    Mild elevation in immature granulocytes is non specific and can be seen in a variety of conditions including stress response, acute inflammation, trauma and pregnancy. Correlation with other laboratory and clinical findings is essential.         Imaging:     CT Chest 7/02/25  Right chest port catheter tip terminates in the right atrium.     Thoracic aorta is normal caliber and contains atherosclerosis along its course. Heart is normal size. Multivessel coronary artery calcification. No pericardial effusion.     No pathologically enlarged thoracic lymph nodes.     Small aerated density along the posterior aspect of the bronchus intermedius likely representing retained secretion (series 4, image 191).     Thin bandlike densities in the right lower lobe radiating from the pleural margin with minimal surrounding ground-glass density (series 4, image 166). Findings likely represent post radiation changes. Stable 4 mm micronodule in the  right lower lobe (series 4, image 159). No new or enlarging nodule. No pleural effusion or pneumothorax.     Partially visualized hernia in the midline upper ventral abdominal wall. Cholecystectomy.     Partially visualized operative changes of cervical fusion. Degenerative changes of the spine. No acute or aggressive bony abnormality.       Assessment:       1. Cancer of lower lobe of right lung    2. Other specified disorders involving the immune mechanism, not elsewhere classified    3. Immunodeficiency due to chemotherapy    4. MEN (multiple endocrine neoplasia)    5. Pancreatic insufficiency    6. Pancytopenia    7. Arachnoid membrane inflammation    8. Hypothyroidism, unspecified type    9. Hypertension, unspecified type    10. Radiation esophagitis                       Plan:     NSCLC - Pt with stage IIIa pT1cN2 NSCLC SCC of the RLL with met to station 7 LN  -TEMPUS tissue testing showed mutations in CDKN2A, FAT1, and TP53.  PD-L1 was <1%  -MRI brain KRISHNA on 3/07/25  -Plan for Carboplatin and Paclitaxel with XRT followed by year treatment with Durvalumab  -Pt completed 6 cycles total  -Radiation completed 07/02/2025  -CT chest on 07/02/2025 shows radiation changes and no obvious signs of malignancy   -Patient to start on durvalumab every 4 weeks   -Patient to attend chemo class  Visit today included increased complexity associated with the care of the episodic problem (Immunotherapy) addressed and managing the longitudinal care of the patient due to the serious and/or complex managed problem(s) NSCLC.    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 CT T spien and MRI T spine 3/13/25  -Lesion seen in the cord measuring 55 mm in length by 10 mm medial lateral and 9 mm  AP from T2-T4  -MRI T-spine on 03/13/2025 showed expansile mass in the thoracic cord concerning for potential ependymoma versus astrocytoma (Report in media)  -Repeat MRI T-spine 4/11/25 showed a small syrinx with mild surrounding edema presumably related to mass effect and deformity of the cord at T3-4 from a 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  -Dr Staples 4/15/25 felt the patient needed fenestration with neurosurgery  -Pt following with Dr Freddy Burch    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 Pt needs approval for Durvalumab.  Pt needs chemo class.  Pt needs a CBC, CMP, TSH with appt with me the day prior to treatment.   Follow up with [facility]    Infusion scheduling note    Injection scheduling note    Labs    Imaging    Pharmacy appointment    Other referrals              Treatment Plan Information   OP durvalumab 1500mg Q4W Sea William MD   Associated diagnosis: Cancer of lower lobe of right lung  cT1c, cN2(f), cM0 noted on 3/14/2025   Line of treatment: Adjuvant  Treatment Goal: Curative     Upcoming Treatment Dates - OP durvalumab 1500mg Q4W    7/21/2025       Chemotherapy       durvalumab (IMFINZI) 1,500 mg in 0.9% NaCl SolP 280 mL chemo infusion  8/18/2025       Chemotherapy       durvalumab (IMFINZI) 1,500 mg in 0.9% NaCl SolP 280 mL chemo infusion  9/15/2025       Chemotherapy       durvalumab (IMFINZI) 1,500 mg  in 0.9% NaCl SolP 280 mL chemo infusion  10/13/2025       Chemotherapy       durvalumab (IMFINZI) 1,500 mg in 0.9% NaCl SolP 280 mL chemo infusion    Supportive Plan Information  IV FLUIDS AND ELECTROLYTES Sea William MD   Associated Diagnosis: Cancer related pain   noted on 6/9/2025   Line of treatment: Supportive Care   Treatment goal: Supportive     Upcoming Treatment Dates - IV FLUIDS AND ELECTROLYTES    No upcoming days in selected categories.    Therapy Plan Information  INF FLUIDS for Dehydration, noted on 6/24/2025  IV Fluids  sodium chloride 0.9% bolus 1,000 mL 1,000 mL  1,000 mL, Intravenous, Every visit  Flushes  sodium chloride 0.9% flush 10 mL  10 mL, Intravenous, PRN  heparin, porcine (PF) 100 unit/mL injection flush 500 Units  500 Units, Intravenous, PRN      No therapy plan of the specified type found.    No therapy plan of the specified type found.      Sea William MD  [facility]  Hematology and Oncology  [facility]   [address] [place] [address]   O: [phone]  F: [phone]                         [1]   Current Outpatient Medications   Medication Sig Dispense Refill    acetaminophen (TYLENOL) 325 MG tablet Take 2 tablets (650 mg total) by mouth every 6 (six) hours as needed for Pain (Do not take with any other tylenol containing products).  0    allopurinoL (ZYLOPRIM) 300 MG tablet TAKE 1 TABLET DAILY 90 tablet 1    amLODIPine (NORVASC) 5 MG tablet TAKE 1 TABLET(5 MG) BY MOUTH TWICE DAILY 180 tablet 1    busPIRone (BUSPAR) 5 MG Tab Take 1 tablet (5 mg total) by mouth 2 (two) times daily. 180 tablet 3    calcium carbonate (CALCIUM 500) 500 mg calcium (1,250 mg) chewable tablet Take 1 tablet by mouth once daily.      clopidogreL (PLAVIX) 75 mg tablet Take 1 tablet (75 mg total) by mouth once daily. 90 tablet 3    colestipoL (COLESTID) 1 gram Tab TAKE 2 TABLETS ONCE DAILY 180 tablet 1    cyanocobalamin 1,000 mcg/mL injection INJECT 1 ML IN THE  MUSCLE EVERY 14 DAYS 10 mL 0    diphenhydrAMINE-aluminum-magnesium hydroxide-simethicone-LIDOcaine viscous HCl 2% Swish and spit 15 mLs every 4 (four) hours as needed. 500 each 3    EScitalopram oxalate (LEXAPRO) 20 MG tablet TAKE 1 TABLET EVERY EVENING 90 tablet 1    fluticasone propionate (FLONASE) 50 mcg/actuation nasal spray 2 sprays (100 mcg total) by Each Nostril route 2 (two) times daily. 48 g 3    furosemide (LASIX) 20 MG tablet TAKE 1 TABLET(20 MG) BY MOUTH EVERY DAY 90 tablet 3    gabapentin (NEURONTIN) 400 MG capsule Take 1 capsule (400 mg total) by mouth 2 (two) times daily. 180 capsule 3    hydrALAZINE (APRESOLINE) 25 MG tablet Take 1 tablet (25 mg total) by mouth daily as needed (Check BP after taking amlodipine, Lasix, metoprolol.  If it continues to stay greater than 160/100 take hydralazine). 30 tablet 0    levothyroxine (SYNTHROID) 75 MCG tablet TAKE 1 TABLET DAILY 90 tablet 1    LIDOcaine-prilocaine (EMLA) cream Apply topically as needed (Apply 30 minutes prior to port access). 30 g 2    magic mouthwash diphen/antac/lidoc/nysta Take 10 mLs by mouth 4 (four) times daily. 240 mL 1    magnesium oxide (MAG-OX) 400 mg (241.3 mg magnesium) tablet Take 1 tablet (400 mg total) by mouth once daily.      methocarbamoL (ROBAXIN) 750 MG Tab Take 1 tablet (750 mg total) by mouth every 8 (eight) hours as needed (muscle spasm). 270 tablet 3    metoprolol succinate (TOPROL-XL) 50 MG 24 hr tablet Take 1 tablet (50 mg total) by mouth once daily. Pt takes 1/2 tab daily 90 tablet 2    mupirocin (BACTROBAN) 2 % ointment Apply topically once daily.      oxyCODONE-acetaminophen (PERCOCET)  mg per tablet Take 1 tablet by mouth every 8 (eight) hours as needed.      oxyCODONE-acetaminophen (PERCOCET) 5-325 mg per tablet Take 1 tablet by mouth every 8 (eight) hours as needed for Pain.      pantoprazole (PROTONIX) 40 MG tablet TAKE 1 TABLET DAILY 90 tablet 3    potassium chloride SA (K-DUR,KLOR-CON M) 10 MEQ tablet  Take 1 tablet (10 mEq total) by mouth once daily. 90 tablet 3    rosuvastatin (CRESTOR) 10 MG tablet TAKE 1 TABLET DAILY 90 tablet 3    sucralfate (CARAFATE) 1 gram tablet Take 1 tablet (1 g total) by mouth 4 (four) times daily before meals and nightly. 120 tablet 3    traMADoL (ULTRAM) 50 mg tablet Take 1 tablet (50 mg total) by mouth every 6 (six) hours as needed for Pain. 10 tablet 0    traZODone (DESYREL) 100 MG tablet TAKE 1 TABLET EVERY EVENING 90 tablet 3    triamcinolone acetonide 0.1% (KENALOG) 0.1 % ointment Apply topically 2 (two) times daily.      vit A/vit C/vit E/zinc/copper (PRESERVISION AREDS ORAL) Take 1 capsule by mouth 2 (two) times a day.      vitamin D (VITAMIN D3) 1000 units Tab Take 1,000 Units by mouth 2 (two) times a day.       No current facility-administered medications for this visit.     Facility-Administered Medications Ordered in Other Visits   Medication Dose Route Frequency Provider Last Rate Last Admin    oxyCODONE immediate release tablet 5 mg  5 mg Oral Once PRN Paul Nunez MD

## 2025-07-07 ENCOUNTER — OFFICE VISIT (OUTPATIENT)
Dept: HEMATOLOGY/ONCOLOGY | Facility: CLINIC | Age: 70
End: 2025-07-07
Payer: MEDICARE

## 2025-07-07 VITALS
RESPIRATION RATE: 16 BRPM | OXYGEN SATURATION: 97 % | SYSTOLIC BLOOD PRESSURE: 129 MMHG | HEIGHT: 67 IN | BODY MASS INDEX: 30.27 KG/M2 | TEMPERATURE: 97 F | WEIGHT: 192.88 LBS | DIASTOLIC BLOOD PRESSURE: 77 MMHG | HEART RATE: 79 BPM

## 2025-07-07 DIAGNOSIS — E31.20 MEN (MULTIPLE ENDOCRINE NEOPLASIA): ICD-10-CM

## 2025-07-07 DIAGNOSIS — K86.89 PANCREATIC INSUFFICIENCY: ICD-10-CM

## 2025-07-07 DIAGNOSIS — D61.818 PANCYTOPENIA: ICD-10-CM

## 2025-07-07 DIAGNOSIS — D89.89 OTHER SPECIFIED DISORDERS INVOLVING THE IMMUNE MECHANISM, NOT ELSEWHERE CLASSIFIED: ICD-10-CM

## 2025-07-07 DIAGNOSIS — K20.80 RADIATION ESOPHAGITIS: ICD-10-CM

## 2025-07-07 DIAGNOSIS — T66.XXXA RADIATION ESOPHAGITIS: ICD-10-CM

## 2025-07-07 DIAGNOSIS — Z79.69 IMMUNODEFICIENCY DUE TO CHEMOTHERAPY: ICD-10-CM

## 2025-07-07 DIAGNOSIS — E03.9 HYPOTHYROIDISM, UNSPECIFIED TYPE: ICD-10-CM

## 2025-07-07 DIAGNOSIS — T45.1X5A IMMUNODEFICIENCY DUE TO CHEMOTHERAPY: ICD-10-CM

## 2025-07-07 DIAGNOSIS — I10 HYPERTENSION, UNSPECIFIED TYPE: ICD-10-CM

## 2025-07-07 DIAGNOSIS — D84.821 IMMUNODEFICIENCY DUE TO CHEMOTHERAPY: ICD-10-CM

## 2025-07-07 DIAGNOSIS — G03.9: ICD-10-CM

## 2025-07-07 DIAGNOSIS — C34.31 CANCER OF LOWER LOBE OF RIGHT LUNG: Primary | ICD-10-CM

## 2025-07-07 PROCEDURE — 99215 OFFICE O/P EST HI 40 MIN: CPT | Mod: PBBFAC,PN | Performed by: INTERNAL MEDICINE

## 2025-07-07 PROCEDURE — 99999 PR PBB SHADOW E&M-EST. PATIENT-LVL V: CPT | Mod: PBBFAC,,, | Performed by: INTERNAL MEDICINE

## 2025-07-07 PROCEDURE — G2211 COMPLEX E/M VISIT ADD ON: HCPCS | Mod: ,,, | Performed by: INTERNAL MEDICINE

## 2025-07-07 PROCEDURE — 99215 OFFICE O/P EST HI 40 MIN: CPT | Mod: S$PBB,,, | Performed by: INTERNAL MEDICINE

## 2025-07-07 NOTE — PLAN OF CARE
DISCONTINUE ON PATHWAY REGIMEN - Non-Small Cell Lung    [id number]        Paclitaxel       Carboplatin           Additional Orders: Chemotherapy is given concurrently with radiation.    **Always confirm dose/schedule in your pharmacy ordering system**    REASON: Other Reason  PRIOR TREATMENT: [id number]  TREATMENT RESPONSE: Unable to Evaluate    START ON PATHWAY REGIMEN - Non-Small Cell Lung    [id number]        Durvalumab (Imfinzi)           Additional Orders: For patients weighing less than 30 kg, only   weight-based dosing is recommended for durvalumab. For patients weighing greater   than or equal to 30 kg, weight-based or flat dosing can be used based on   indication. See PI for details. Serious immune-mediated adverse events can occur   with durvalumab. Please monitor your patient and refer to the linked   immune-mediated adverse reaction management materials for more information.    **Always confirm dose/schedule in your pharmacy ordering system**    Patient Characteristics:  Preoperative or Nonsurgical Candidate (Clinical Staging), Stage IIB (N2a only)   or Stage III - Nonsurgical Candidate, PS = 0,1  Therapeutic Status: Preoperative or Nonsurgical Candidate (Clinical Staging)  AJCC T Category: cTX  AJCC N Category: cNX  AJCC M Category: cM0  AJCC 9 Stage Grouping: IIIA  Check here if patient was staged using an edition other than AJCC Staging 9th   Edition: false  ECOG Performance Status: 1  Intent of Therapy:  Curative Intent, Discussed with Patient

## 2025-07-08 ENCOUNTER — TELEPHONE (OUTPATIENT)
Dept: HEMATOLOGY/ONCOLOGY | Facility: CLINIC | Age: 70
End: 2025-07-08
Payer: MEDICARE

## 2025-07-08 NOTE — NURSING
Patient returned my call and confirmed all upcoming appointments for next week.   Chemo school scheduled for Monday at 1300 with Kyra Pisano NP. Labs, Dr. William visit, and 1st Imfinzi infusion scheduled for Wednesday, July 16th, beginning at 1300.  Dates, times, and locations confirmed.

## 2025-07-08 NOTE — NURSING
Reached out to patient to schedule chemo school for her consolidation  imfinzi treatment plan. Clinic and infusion are able to coordinate her start date of Wednesday, 7/16.   LVM with contact information to confirm chemo school and start of treatment appointments.

## 2025-07-14 ENCOUNTER — TELEPHONE (OUTPATIENT)
Dept: HEMATOLOGY/ONCOLOGY | Facility: CLINIC | Age: 70
End: 2025-07-14
Payer: MEDICARE

## 2025-07-14 NOTE — NURSING
Returned call to patient. She asked to please cancel her chemo school today as she is having stomach troubles today. Patient is also asking to cancel her immunotherapy Wednesday as she is worried it will exacerbate her current diarrhea. Patient states she takes medication daily for diarrhea.  Will coordinate new chemo school, clearance visit, and infusion date with clinic and infusion.

## 2025-07-18 ENCOUNTER — TELEPHONE (OUTPATIENT)
Dept: HEMATOLOGY/ONCOLOGY | Facility: CLINIC | Age: 70
End: 2025-07-18
Payer: MEDICARE

## 2025-07-18 NOTE — TELEPHONE ENCOUNTER
Copied from CRM #[id number]. Topic: General Inquiry - Patient Advice  >> Jul 18, 2025  4:27 PM Denny wrote:  Type: Needs Medical Advice  Who Called:  pt   Symptoms (please be specific):    How long has patient had these symptoms:    Pharmacy name and phone #:    Best Call Back Number: [phone]  Additional Information: pt is requesting a call back from nurse regarding her thinking her intestines are blocked and she is having bad joint pain

## 2025-07-18 NOTE — TELEPHONE ENCOUNTER
"Spoke with patient --- she has concerns that she feels she is "getting backed up" with her bowels. Only able to pass liquid stool - sometimes multiple times a day. Patient also verbalized she is experiencing joint pain. Due to the concern of possibly having an obstruction, nurse advised patient to present to the ED to have a full workup done. Patient agreeable to go to the ER.   "

## 2025-07-21 ENCOUNTER — CLINICAL SUPPORT (OUTPATIENT)
Dept: HEMATOLOGY/ONCOLOGY | Facility: CLINIC | Age: 70
End: 2025-07-21
Payer: MEDICARE

## 2025-07-21 ENCOUNTER — OFFICE VISIT (OUTPATIENT)
Dept: RADIATION ONCOLOGY | Facility: CLINIC | Age: 70
End: 2025-07-21
Payer: MEDICARE

## 2025-07-21 VITALS
DIASTOLIC BLOOD PRESSURE: 74 MMHG | WEIGHT: 193.31 LBS | HEART RATE: 84 BPM | HEIGHT: 67 IN | SYSTOLIC BLOOD PRESSURE: 114 MMHG | TEMPERATURE: 98 F | RESPIRATION RATE: 18 BRPM | BODY MASS INDEX: 30.34 KG/M2 | OXYGEN SATURATION: 96 %

## 2025-07-21 VITALS
TEMPERATURE: 98 F | RESPIRATION RATE: 16 BRPM | SYSTOLIC BLOOD PRESSURE: 155 MMHG | HEART RATE: 89 BPM | BODY MASS INDEX: 30.17 KG/M2 | OXYGEN SATURATION: 94 % | DIASTOLIC BLOOD PRESSURE: 70 MMHG | WEIGHT: 192.25 LBS | HEIGHT: 67 IN

## 2025-07-21 DIAGNOSIS — C34.31 CANCER OF LOWER LOBE OF RIGHT LUNG: ICD-10-CM

## 2025-07-21 DIAGNOSIS — C34.31 CANCER OF LOWER LOBE OF RIGHT LUNG: Primary | ICD-10-CM

## 2025-07-21 PROCEDURE — 99999 PR PBB SHADOW E&M-EST. PATIENT-LVL V: CPT | Mod: PBBFAC,,,

## 2025-07-21 PROCEDURE — 99215 OFFICE O/P EST HI 40 MIN: CPT | Mod: S$PBB,,,

## 2025-07-21 PROCEDURE — 99215 OFFICE O/P EST HI 40 MIN: CPT | Mod: PBBFAC,27,PN

## 2025-07-21 PROCEDURE — 99999 PR PBB SHADOW E&M-EST. PATIENT-LVL V: CPT | Mod: PBBFAC,,, | Performed by: RADIOLOGY

## 2025-07-21 PROCEDURE — 99024 POSTOP FOLLOW-UP VISIT: CPT | Mod: POP,,, | Performed by: RADIOLOGY

## 2025-07-21 PROCEDURE — 99215 OFFICE O/P EST HI 40 MIN: CPT | Mod: PBBFAC,PN | Performed by: RADIOLOGY

## 2025-07-21 NOTE — PROGRESS NOTES
Subjective    Chief complaint: Chemotherapy school   Referring provider: Sea William MD    History of present Illness:  Alessia Nelson is a 69 y.o. female here today for education prior to starting treatment for lung cancer. Their oncologist, Dr. William, recommended treatment with OP durvalumab 1500mg Q4W and the patient has agreed to proceed.        Oncology History   Cancer of lower lobe of right lung   3/14/2025 Initial Diagnosis    Cancer of lower lobe of right lung     3/14/2025 Cancer Staged    Staging form: Lung, AJCC V9  - Clinical stage from 3/14/2025: cT1c, cN2(f), cM0     5/12/2025 - 6/16/2025 Chemotherapy    Treatment Summary   Plan Name: OP NSCLC PACLitaxel CARBOplatin QW + radiation  Treatment Goal: Curative  Status: Inactive  Start Date: 5/12/2025  End Date: 6/16/2025  Provider: Sea William MD  Chemotherapy: CARBOplatin (PARAPLATIN) 205 mg in 0.9% NaCl 305.5 mL chemo infusion, 200 mg, Intravenous, Clinic/HOD 1 time, 6 of 7 cycles  Administration: 205 mg (5/12/2025), 200 mg (5/19/2025), 200 mg (5/26/2025), 220 mg (6/2/2025), 220 mg (6/9/2025), 220 mg (6/16/2025)  PACLitaxeL (TAXOL) 45 mg/m2 = 96 mg in 0.9% NaCl 250 mL chemo infusion, 45 mg/m2 = 90 mg, Intravenous, Clinic/HOD 1 time, 6 of 7 cycles  Administration: 96 mg (5/12/2025), 90 mg (5/19/2025), 90 mg (5/26/2025), 90 mg (6/2/2025), 90 mg (6/9/2025), 96 mg (6/16/2025)     5/12/2025 - 7/2/2025 Radiation Therapy    Treatment Site Ref. ID Energy Dose/Fx (Gy) #Fx Dose Correction (Gy) Total Dose (Gy) Start Date End Date Elapsed Days   IM Lung R iGTV 6X 2.2 30 / 30 0 66 5/12/2025 7/2/2025 51     Treating physician: Li     7/23/2025 -  Chemotherapy    Treatment Summary   Plan Name: OP durvalumab 1500mg Q4W  Treatment Goal: Curative  Status: Active  Start Date: 7/23/2025  End Date: 6/24/2026 (Planned)  Provider: Sea William MD  Chemotherapy: [No matching medication found in this treatment plan]          Review of Systems  "  Constitutional:  Negative for activity change, appetite change, chills, fatigue, fever and unexpected weight change.   Respiratory:  Negative for cough and shortness of breath.    Cardiovascular:  Negative for chest pain and leg swelling.   Gastrointestinal:  Negative for abdominal distention, abdominal pain, constipation, diarrhea, nausea and vomiting.   Genitourinary:  Negative for difficulty urinating, flank pain, hematuria, vaginal bleeding, vaginal discharge and vaginal pain.   Musculoskeletal: Negative.  Negative for gait problem.   Skin: Negative.  Negative for rash.   Neurological: Negative.    Hematological:  Negative for adenopathy. Does not bruise/bleed easily.   Psychiatric/Behavioral: Negative.          Review of patient's allergies indicates:   Allergen Reactions    Aspirin Other (See Comments)     "Makes stomach feel like it's on fire"    Phenergan [promethazine] Other (See Comments)     SEIZURES    Nickel     Lortab [hydrocodone-acetaminophen] Itching     Able to take generic Norco        Current Medications[1]     Past Medical History[2]     Past Surgical History[3]     Social History[4]     Family History   Problem Relation Name Age of Onset    Stroke Maternal Grandmother      Cancer Maternal Grandfather      Stroke Mother      Heart disease Father      Breast cancer Sister       ECOG SCORE    0 - Fully active-able to carry on all pre-disease performance without restriction           Objective    /74 (BP Location: Left arm, Patient Position: Sitting)   Pulse 84   Temp 97.8 °F (36.6 °C) (Temporal)   Resp 18   Ht 5' 7" (1.702 m)   Wt 87.7 kg (193 lb 5.5 oz)   SpO2 96%   BMI 30.28 kg/m²     Physical Exam  Vitals reviewed.   Constitutional:       Appearance: Normal appearance.   Neurological:      General: No focal deficit present.      Mental Status: She is alert and oriented to person, place, and time. Mental status is at baseline.   Psychiatric:         Mood and Affect: Mood normal.   "       Behavior: Behavior normal.            Assessment & Plan  Cancer of lower lobe of right lung  Chemotherapy school   Treatment plan of OP durvalumab 1500mg Q4W discussed with patient. Reviewed duration of treatment and schedule with patient. Provided calendar with appointments.   Handouts provided on chemotherapy agents. Premeds, supportive meds explained.  Discussed the mechanism of action, potential side effects of this treatment as well as ways to manage them at home.   Dietary modifications discussed. Detailed instructions to be provided by dietician.   Chemotherapy folder supplied with contact information and additional educational material.   Discussed follow-up with the physician for toxicity monitoring throughout treatment.    Cycle 1, Day 1 scheduled for 7/23/2025; home prescriptions sent to patient's preferred pharmacy   Informed consent obtained and documented electronically.   Chemotherapy care companion enrollment: status active   Encouraged to call office - phone number provided - to assist with any concerns.              60 minutes were spent in coordination of patient's care, record review and counseling.     RAQUEL Daugherty            [1]   Current Outpatient Medications   Medication Sig Dispense Refill    acetaminophen (TYLENOL) 325 MG tablet Take 2 tablets (650 mg total) by mouth every 6 (six) hours as needed for Pain (Do not take with any other tylenol containing products).  0    allopurinoL (ZYLOPRIM) 300 MG tablet TAKE 1 TABLET DAILY 90 tablet 1    amLODIPine (NORVASC) 5 MG tablet TAKE 1 TABLET(5 MG) BY MOUTH TWICE DAILY 180 tablet 1    busPIRone (BUSPAR) 5 MG Tab Take 1 tablet (5 mg total) by mouth 2 (two) times daily. 180 tablet 3    calcium carbonate (CALCIUM 500) 500 mg calcium (1,250 mg) chewable tablet Take 1 tablet by mouth once daily.      clopidogreL (PLAVIX) 75 mg tablet Take 1 tablet (75 mg total) by mouth once daily. 90 tablet 3    colestipoL (COLESTID) 1 gram Tab TAKE 2  TABLETS ONCE DAILY 180 tablet 1    cyanocobalamin 1,000 mcg/mL injection INJECT 1 ML IN THE MUSCLE EVERY 14 DAYS 10 mL 0    diphenhydrAMINE-aluminum-magnesium hydroxide-simethicone-LIDOcaine viscous HCl 2% Swish and spit 15 mLs every 4 (four) hours as needed. 500 each 3    EScitalopram oxalate (LEXAPRO) 20 MG tablet TAKE 1 TABLET EVERY EVENING 90 tablet 1    fluticasone propionate (FLONASE) 50 mcg/actuation nasal spray 2 sprays (100 mcg total) by Each Nostril route 2 (two) times daily. 48 g 3    furosemide (LASIX) 20 MG tablet TAKE 1 TABLET(20 MG) BY MOUTH EVERY DAY 90 tablet 3    gabapentin (NEURONTIN) 400 MG capsule Take 1 capsule (400 mg total) by mouth 2 (two) times daily. 180 capsule 3    hydrALAZINE (APRESOLINE) 25 MG tablet Take 1 tablet (25 mg total) by mouth daily as needed (Check BP after taking amlodipine, Lasix, metoprolol.  If it continues to stay greater than 160/100 take hydralazine). 30 tablet 0    levothyroxine (SYNTHROID) 75 MCG tablet TAKE 1 TABLET DAILY 90 tablet 1    LIDOcaine-prilocaine (EMLA) cream Apply topically as needed (Apply 30 minutes prior to port access). 30 g 2    magic mouthwash diphen/antac/lidoc/nysta Take 10 mLs by mouth 4 (four) times daily. 240 mL 1    magnesium oxide (MAG-OX) 400 mg (241.3 mg magnesium) tablet Take 1 tablet (400 mg total) by mouth once daily.      methocarbamoL (ROBAXIN) 750 MG Tab Take 1 tablet (750 mg total) by mouth every 8 (eight) hours as needed (muscle spasm). 270 tablet 3    metoprolol succinate (TOPROL-XL) 50 MG 24 hr tablet Take 1 tablet (50 mg total) by mouth once daily. Pt takes 1/2 tab daily 90 tablet 2    mupirocin (BACTROBAN) 2 % ointment Apply topically once daily.      oxyCODONE-acetaminophen (PERCOCET)  mg per tablet Take 1 tablet by mouth every 8 (eight) hours as needed.      oxyCODONE-acetaminophen (PERCOCET) 5-325 mg per tablet Take 1 tablet by mouth every 8 (eight) hours as needed for Pain.      pantoprazole (PROTONIX) 40 MG tablet  TAKE 1 TABLET DAILY 90 tablet 3    potassium chloride SA (K-DUR,KLOR-CON M) 10 MEQ tablet Take 1 tablet (10 mEq total) by mouth once daily. 90 tablet 3    rosuvastatin (CRESTOR) 10 MG tablet TAKE 1 TABLET DAILY 90 tablet 3    sucralfate (CARAFATE) 1 gram tablet Take 1 tablet (1 g total) by mouth 4 (four) times daily before meals and nightly. 120 tablet 3    traMADoL (ULTRAM) 50 mg tablet Take 1 tablet (50 mg total) by mouth every 6 (six) hours as needed for Pain. 10 tablet 0    traZODone (DESYREL) 100 MG tablet TAKE 1 TABLET EVERY EVENING 90 tablet 3    triamcinolone acetonide 0.1% (KENALOG) 0.1 % ointment Apply topically 2 (two) times daily.      vit A/vit C/vit E/zinc/copper (PRESERVISION AREDS ORAL) Take 1 capsule by mouth 2 (two) times a day.      vitamin D (VITAMIN D3) 1000 units Tab Take 1,000 Units by mouth 2 (two) times a day.      (Becketts Solution) 1:1:1:1 diphenhydrAMINE, aluminum-magnesium hydroxide-simethicone (Mylanta), LIDOcaine viscous, nystatin oral solution Take 10 mLs by mouth 4 (four) times daily.       No current facility-administered medications for this visit.     Facility-Administered Medications Ordered in Other Visits   Medication Dose Route Frequency Provider Last Rate Last Admin    oxyCODONE immediate release tablet 5 mg  5 mg Oral Once PRN Paul Nunez MD       [2]   Past Medical History:  Diagnosis Date    Acute pancreatitis     Anticoagulant long-term use     Back pain     CAD (coronary artery disease)     CHF (congestive heart failure)     COPD (chronic obstructive pulmonary disease)     Depression     Diverticulosis     Encounter for blood transfusion     GERD (gastroesophageal reflux disease)     History of blood clots     1986 AFTER BOWEL RESCETION    History of bowel resection     Hyperlipidemia     Hypertension     Malignant neoplasm of unspecified part of unspecified bronchus or lung     Peritonitis     PONV (postoperative nausea and vomiting)     Pulmonary nodule      Seizures     HAD A SEIZURE FROM PHENERGAN     Stroke     left sided weakness    Thyroid disease     TIA (transient ischemic attack)     Wears dentures     upper   [3]   Past Surgical History:  Procedure Laterality Date    ADRENAL GLAND SURGERY      APPENDECTOMY      BACK SURGERY      CAUDAL EPIDURAL STEROID INJECTION N/A 03/01/2021    Procedure: Injection-steroid-epidural-caudal;  Surgeon: Socorro Lorenz MD;  Location: [facility] OR;  Service: Pain Management;  Laterality: N/A;    CHOLECYSTECTOMY      COLONOSCOPY      COLONOSCOPY N/A 06/10/2021    Procedure: COLONOSCOPY;  Surgeon: Sheree Metz MD;  Location: [facility];  Service: Endoscopy;  Laterality: N/A;    COLONOSCOPY N/A 03/08/2022    Procedure: COLONOSCOPY;  Surgeon: Maurilio Rodriguez MD;  Location: [facility] (2ND FLR);  Service: Endoscopy;  Laterality: N/A;  Pt to perform at-home COVID test morning of procedure-DS  2/24-Blood thinner approval rec'[facility] Dr. Walsh (see letters tab 2/24/22)-DS  3/2-Instructions sent via email-DS  3/7-Pt unable to come earlier due to ride-DS    CORONARY STENT PLACEMENT      CYSTOURETHROSCOPY N/A 11/13/2019    Procedure: CYSTOURETHROSCOPY;  Surgeon: Shun Nuñez MD;  Location: [facility] OR;  Service: Urology;  Laterality: N/A;    DIRECT DIAGNOSTIC LARYNGOSCOPY WITH BRONCHOSCOPY AND ESOPHAGOSCOPY N/A 01/03/2024    Procedure: LARYNGOSCOPY, DIRECT, DIAGNOSTIC, WITH BRONCHOSCOPY AND ESOPHAGOSCOPY;  Surgeon: Mir Belle MD;  Location: [facility] OR;  Service: ENT;  Laterality: N/A;    ENDOBRONCHIAL ULTRASOUND Bilateral 2/20/2025    Procedure: ENDOBRONCHIAL ULTRASOUND (EBUS);  Surgeon: Ismael Cooper MD;  Location: [facility] OR;  Service: Pulmonary;  Laterality: Bilateral;    ENDOSCOPIC ULTRASOUND OF LOWER GASTROINTESTINAL TRACT N/A 03/08/2022    Procedure: ULTRASOUND, LOWER GI TRACT, ENDOSCOPIC;  Surgeon: Maurilio Rodriguez MD;  Location: [facility] (2ND FLR);  Service: Endoscopy;  Laterality: N/A;  Pt to perform at-home COVID test morning of  procedure-DS  [facility]see [facility] tab [facility]DS  3/2-Instructions sent via email-DS  3/7-Pt unable to come earlier due to ride-DS    ENDOSCOPIC ULTRASOUND OF UPPER GASTROINTESTINAL TRACT N/A 11/25/2019    Procedure: ULTRASOUND, UPPER GI TRACT, ENDOSCOPIC;  Surgeon: Alhaji Bridges MD;  Location: [facility] (2ND FLR);  Service: Endoscopy;  Laterality: N/A;  5 day hold Plavix, Dr Jovanni Serrano - pg  PM prep    ENDOSCOPIC ULTRASOUND OF UPPER GASTROINTESTINAL TRACT N/A 11/02/2020    Procedure: ULTRASOUND, UPPER GI TRACT, ENDOSCOPIC;  Surgeon: Maurilio Rodriguez MD;  Location: [facility] ENDO (2ND FLR);  Service: Endoscopy;  Laterality: N/A;  5 day hold Plavix, Dr Roman Walsh - pg  Covid-19 test 10/30/20 at [facility]    EPIDURAL STEROID INJECTION INTO CERVICAL SPINE N/A 12/08/2021    Procedure: Injection-steroid-epidural-cervical;  Surgeon: Socorro Lorenz MD;  Location: [facility] OR;  Service: Pain Management;  Laterality: N/A;    EPIDURAL STEROID INJECTION INTO CERVICAL SPINE N/A 11/30/2023    Procedure: Injection-steroid-epidural-cervical;  Surgeon: Maurilio Carroll MD;  Location: [facility] OR;  Service: Anesthesiology;  Laterality: N/A;  c7-T1    ESOPHAGOGASTRODUODENOSCOPY N/A 06/10/2021    Procedure: EGD (ESOPHAGOGASTRODUODENOSCOPY);  Surgeon: Sheree Metz MD;  Location: [facility] ENDO;  Service: Endoscopy;  Laterality: N/A;    ESOPHAGOGASTRODUODENOSCOPY N/A 09/26/2024    Procedure: EGD (ESOPHAGOGASTRODUODENOSCOPY);  Surgeon: Jovanny Saldivar MD;  Location: [facility] ENDO;  Service: Endoscopy;  Laterality: N/A;    ESOPHAGOSCOPY, USING BOUGIE, WITH DILATION N/A 01/03/2024    Procedure: ESOPHAGOSCOPY, USING BOUGIE, WITH DILATION;  Surgeon: Mir Belle MD;  Location: [facility] OR;  Service: ENT;  Laterality: N/A;    FRACTURE SURGERY Left 05/02/2022    ankle    HERNIA REPAIR      HYSTERECTOMY      INCISIONAL HERNIA REPAIR      INJECTION OF ANESTHETIC AGENT AROUND NERVE Right 05/27/2019     Procedure: RIGHT L5-S3 MEDIAL BRANCH BLOCKS;  Surgeon: Sneha Aragon MD;  Location: [facility] OR;  Service: Pain Management;  Laterality: Right;  **DO NOT STOP PLAVIX**    INJECTION OF JOINT Right 09/22/2021    Procedure: Injection, Joint; Right SI Joint Injection;  Surgeon: Socorro Lorenz MD;  Location: [facility] OR;  Service: Pain Management;  Laterality: Right;  Oral    INSERTION OF DORSAL COLUMN NERVE STIMULATOR FOR TRIAL N/A 06/07/2021    Procedure: INSERTION, NEUROSTIMULATOR, SPINAL CORD, DORSAL COLUMN, FOR TRIAL;  Surgeon: Socorro Lorenz MD;  Location: [facility] OR;  Service: Pain Management;  Laterality: N/A;  nevro rep confirmed start time    INSERTION OF TUNNELED CENTRAL VENOUS CATHETER (CVC) WITH SUBCUTANEOUS PORT N/A 5/7/2025    Procedure: [id number];  Surgeon: Janet Jarrett MD;  Location: [facility];  Service: General;  Laterality: N/A;    instestine      bowel section    KNEE ARTHROPLASTY Right 12/18/2019    Procedure: ARTHROPLASTY, KNEE;  Surgeon: Ike Briceño II, MD;  Location: [facility] OR;  Service: Orthopedics;  Laterality: Right;    KNEE SURGERY  1983    OOPHORECTOMY      PARATHYROID GLAND SURGERY      3 surgeries    RADIOFREQUENCY ABLATION Right 06/10/2019    Procedure: Radiofrequency Ablation - RIGHT L3-5 RADIOFREQUENCY ABLATION WITH HappyFactoryYARD COOLIEF THERMAL SYSTEM;  Surgeon: Sneha Aragon MD;  Location: [facility] OR;  Service: Pain Management;  Laterality: Right;  **HOLD PLAVIX x 7 DAYS PRIOR**    REVISION OF KNEE ARTHROPLASTY Right     AVALA with Dr Singh    ROBOTIC BRONCHOSCOPY Bilateral 2/20/2025    Procedure: ROBOTIC BRONCHOSCOPY;  Surgeon: Ismael Cooper MD;  Location: [facility] OR;  Service: Pulmonary;  Laterality: Bilateral;    SPINAL CORD STIMULATOR REMOVAL N/A 02/21/2024    Procedure: REMOVAL, NEUROSTIMULATOR, SPINAL;  Surgeon: Maurilio Carroll MD;  Location: [facility] OR;  Service: Pain Management;  Laterality: N/A;  removal of spinal cord stimulator    UPPER GASTROINTESTINAL ENDOSCOPY     [4]    Social History  Tobacco Use    Smoking status: Former     Current packs/day: 0.00     Average packs/day: 1 pack/day for 46.0 years (46.0 ttl pk-yrs)     Types: Cigarettes     Start date: 1977     Quit date: 2023     Years since quittin.5    Smokeless tobacco: Never   Substance Use Topics    Alcohol use: Not Currently    Drug use: Yes     Comment: medical marijuana

## 2025-07-21 NOTE — PROGRESS NOTES
[facility] Department of Radiation Oncology  Follow Up Visit Note    Diagnosis:  Alessia Nelson is a 69 y.o. 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       Oncologic History:  Oncology History   Cancer of lower lobe of right lung   3/14/2025 Initial Diagnosis    Cancer of lower lobe of right lung     3/14/2025 Cancer Staged    Staging form: Lung, AJCC V9  - Clinical stage from 3/14/2025: cT1c, cN2(f), cM0     5/12/2025 - 6/16/2025 Chemotherapy    Treatment Summary   Plan Name: OP NSCLC PACLitaxel CARBOplatin QW + radiation  Treatment Goal: Curative  Status: Inactive  Start Date: 5/12/2025  End Date: 6/16/2025  Provider: Sea William MD  Chemotherapy: CARBOplatin (PARAPLATIN) 205 mg in 0.9% NaCl 305.5 mL chemo infusion, 200 mg, Intravenous, Clinic/HOD 1 time, 6 of 7 cycles  Administration: 205 mg (5/12/2025), 200 mg (5/19/2025), 200 mg (5/26/2025), 220 mg (6/2/2025), 220 mg (6/9/2025), 220 mg (6/16/2025)  PACLitaxeL (TAXOL) 45 mg/m2 = 96 mg in 0.9% NaCl 250 mL chemo infusion, 45 mg/m2 = 90 mg, Intravenous, Clinic/HOD 1 time, 6 of 7 cycles  Administration: 96 mg (5/12/2025), 90 mg (5/19/2025), 90 mg (5/26/2025), 90 mg (6/2/2025), 90 mg (6/9/2025), 96 mg (6/16/2025)     5/12/2025 - 7/2/2025 Radiation Therapy    Treatment Site Ref. ID Energy Dose/Fx (Gy) #Fx Dose Correction (Gy) Total Dose (Gy) Start Date End Date Elapsed Days   IM Lung R iGTV 6X 2.2 30 / 30 0 66 5/12/2025 7/2/2025 51     Treating physician: Li     7/21/2025 -  Chemotherapy    Treatment Summary   Plan Name: OP durvalumab 1500mg Q4W  Treatment Goal: Curative  Status: Active  Start Date: 7/21/2025 (Planned)  End Date: 6/22/2026 (Planned)  Provider: Sea William MD  Chemotherapy: [No matching medication found in this treatment plan]          Interval History  The patient presents today for a regularly scheduled follow up visit.  She was last seen  "in our clinic on 7/2/25.   Since that time, she states that she feels a little winded at times but in general doesn't have any significant pulmonary complaints.  She complains of GI discomfort.    Review of Systems:   Constistutional: Denies fever, chills. No recent weight changes. Denies nights sweats.  Eyes: No redness or dryness.  ENT: Denies dry mouth. No ulcers.  Card: Denies chest pain. No PND, or orthopnea.   Resp: Denies cough. Denies shortness of breath.  Gastro: Denies nausea or vomiting, constipation, diarrhea.  Genito: No kidney stones. Denies dysuria.  Skin: No rash, psoriasis, or alopecia.  Musculoskeletal:No myalgia. No muscle weakness.  Neuro: No numbness or tingling. No history of seizures or psychosis.  Psych: Denies depression. Denies anxiety.  Endo: Denies history of diabetes. No thyroid disease.  Heme: Denies anemia. No blood clots or bleeding diathesis.  Allergic: Denies seasonal allergies.   All other systems negative    Social History:  Social History[1]    Family History:  Cancer-related family history includes Breast cancer in her sister; Cancer in her maternal grandfather.    Exam:  Vitals:    07/21/25 1328   BP: (!) 155/70   Pulse: 89   Resp: 16   Temp: 98.2 °F (36.8 °C)   SpO2: (!) 94%   Weight: 87.2 kg (192 lb 3.9 oz)   Height: 5' 7" (1.702 m)     Constitutional: Pleasant 69 y.o. female in no acute distress.  Well nourished. Well groomed.   HEENT: Normocephalic and atraumatic   Lungs: No audible wheezing.  Normal effort.   Musculoskeletal: No gross MSK deformities. Ambulates  Skin: No rashes appreciated.   Psych: Alert and oriented with appropriate mood and affect.  Neuro:   Grossly normal.    Data Review:    Independent Interpretation of Test(s): CT chest 7/2/25 reviewed.         Assessment:  Mrs. Nelson is a 69 y.o. with Stage III right lung squamous cell carcinoma s/p chemoradiation completed 7/2/25. She is now on immunotherapy.  ECOG: (0) Fully active, able to carry on all " predisease performance without restriction    Plan:  Follow up in 3 months.  She was given our contact information, and she was told that she could call our clinic at anytime if she has any questions or concerns.  Follow up with other providers as directed           [1]   Social History  Tobacco Use    Smoking status: Former     Current packs/day: 0.00     Average packs/day: 1 pack/day for 46.0 years (46.0 ttl pk-yrs)     Types: Cigarettes     Start date: 1977     Quit date: 2023     Years since quittin.5    Smokeless tobacco: Never   Substance Use Topics    Alcohol use: Not Currently    Drug use: Yes     Comment: medical marijuana

## 2025-07-22 NOTE — PROGRESS NOTES
PATIENT: Alessia Nelson  MRN: [id number]  DATE: 7/23/2025      Diagnosis:   1. Cancer of lower lobe of right lung    2. Other specified disorders involving the immune mechanism, not elsewhere classified    3. Immunodeficiency due to chemotherapy    4. MEN (multiple endocrine neoplasia)    5. Pancreatic insufficiency    6. Arachnoid membrane inflammation    7. Hypothyroidism, unspecified type    8. Hypertension, unspecified type    9. Macrocytic anemia      Chief Complaint: Cancer of lower lobe of right lung      Oncologic History:      Oncologic History     Oncologic Treatment     Pathology           Subjective:    Interval History:  Ms. Nelson is a 69 y.o. female with CAD, CHF, COPD, depression, GERD, HTN, HLD, Seizures, h/o CVA, thyroid disease who presents for NSCLC.  Since the last clinic visit the patient endorses diffuse joint pain worse at night.  The patient also endorses fatigue and occasional cough.  The patient endorses shortness of breath with exertion.  Patient denies chest pain and fever.  The patient denies abdominal pain, nausea, vomiting, constipation, diarrhea.  The patient denies chills, night sweats, weight loss, new lumps or bumps, easy bruising or bleeding.    Prior History:  The patient was being followed for a nodule in the right lower lobe and had PET-CT on 05/22/2024 which showed no avidity.  CT chest on 11/11/2024 showed enlargement of the ground-glass right lower lobe lung nodule measuring 18 x 19 mm and small bilateral soft tissue pulmonary nodules measuring 3-8 mm.  The patient underwent robotic bronchoscopy and EBUS on 02/20/2025 with pathology showing squamous cell carcinoma and biopsy of right lower lung nodule and a positive station 7 lymph node.  Stations 4R, 11R S and 11 RI were negative.  TEMPUS tissue testing showed mutations in CDKN2A, FAT1, and TP53.  PD-L1 was <1%.  The patient underwent CT myelogram and MRI of the thoracic spine 03/13/2025 for chronic upper back pain with  radiation down bilateral arms.   CT thoracic spine on 03/13/2025 report obtained showing stable expansile mass of the superior thoracic cord extending from T2 through T4 measuring 55 mm in length by 10 mm medial lateral and 9 mm AP.  MRI T-spine on 03/13/2025 showed expansile mass in the thoracic cord concerning for potential ependymoma versus astrocytoma.   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  Dr Staples 4/15/25 felt the patient needed fenestration with neurosurgery.    The patient completed radiation on 7/02/25.  CT chest on 07/02/2025 showed small aerated density along the posterior aspect bronchus intermedius; thin bandlike densities in the right lower lobe radiating of pleural margin with minimal surrounding ground-glass density likely related to radiation changes; small stable 4 mm micronodule in the right lower lobe.     Past Medical History:   Past Medical History:   Diagnosis Date    Acute pancreatitis     Anticoagulant long-term use     Back pain     CAD (coronary artery disease)     CHF (congestive heart failure)     COPD (chronic obstructive pulmonary disease)     Depression     Diverticulosis     Encounter for blood transfusion     GERD (gastroesophageal reflux disease)     History of blood clots     1986 AFTER BOWEL RESCETION    History of bowel resection     Hyperlipidemia     Hypertension     Malignant neoplasm of unspecified part of unspecified bronchus or lung     Peritonitis     PONV (postoperative nausea and vomiting)     Pulmonary nodule     Seizures     HAD A SEIZURE FROM PHENERGAN     Stroke     left sided weakness    Thyroid disease     TIA (transient ischemic attack)     Wears dentures     upper       Past Surgical HIstory:   Past Surgical History:   Procedure Laterality Date    ADRENAL GLAND SURGERY       APPENDECTOMY      BACK SURGERY      CAUDAL EPIDURAL STEROID INJECTION N/A 03/01/2021    Procedure: Injection-steroid-epidural-caudal;  Surgeon: Socorro Lorenz MD;  Location: [facility] OR;  Service: Pain Management;  Laterality: N/A;    CHOLECYSTECTOMY      COLONOSCOPY      COLONOSCOPY N/A 06/10/2021    Procedure: COLONOSCOPY;  Surgeon: Sheree Metz MD;  Location: [facility] ENDO;  Service: Endoscopy;  Laterality: N/A;    COLONOSCOPY N/A 03/08/2022    Procedure: COLONOSCOPY;  Surgeon: Maurilio Rodriguez MD;  [facility]  Service: Endoscopy;  Laterality: N/A;  Pt to perform at-home COVID test morning of procedure-DS  2/24-Blood thinner approval rec'[facility] Dr. Walsh (see letters tab 2/24/22)-DS  3/2-Instructions sent via email-DS  3/7-Pt unable to come earlier due to ride-DS    CORONARY STENT PLACEMENT      CYSTOURETHROSCOPY N/A 11/13/2019    Procedure: CYSTOURETHROSCOPY;  Surgeon: Shun Nuñez MD;  Location: [facility] OR;  Service: Urology;  Laterality: N/A;    DIRECT DIAGNOSTIC LARYNGOSCOPY WITH BRONCHOSCOPY AND ESOPHAGOSCOPY N/A 01/03/2024    Procedure: LARYNGOSCOPY, DIRECT, DIAGNOSTIC, WITH BRONCHOSCOPY AND ESOPHAGOSCOPY;  Surgeon: Mir Belle MD;  Location: [facility] OR;  Service: ENT;  Laterality: N/A;    ENDOBRONCHIAL ULTRASOUND Bilateral 2/20/2025    Procedure: ENDOBRONCHIAL ULTRASOUND (EBUS);  Surgeon: Ismael Cooper MD;  Location: [facility] OR;  Service: Pulmonary;  Laterality: Bilateral;    ENDOSCOPIC ULTRASOUND OF LOWER GASTROINTESTINAL TRACT N/A 03/08/2022    Procedure: ULTRASOUND, LOWER GI TRACT, ENDOSCOPIC;  Surgeon: Maurilio Rodriguez MD;  [facility]  Service: Endoscopy;  Laterality: N/A;  Pt to perform at-home COVID test morning of procedure-DS  2/24-Blood thinner approval rec'[facility] Dr. Walsh (see letters tab 2/24/22)-DS  3/2-Instructions sent via email-DS  3/7-Pt unable to come earlier due to ride-DS    ENDOSCOPIC ULTRASOUND OF UPPER GASTROINTESTINAL TRACT N/A 11/25/2019     Procedure: ULTRASOUND, UPPER GI TRACT, ENDOSCOPIC;  Surgeon: Alhaji Bridges MD;  Location: [facility] ENDO (2ND FLR);  Service: Endoscopy;  Laterality: N/A;  5 day hold Plavix, Dr Jovanni Serrano - pg  PM prep    ENDOSCOPIC ULTRASOUND OF UPPER GASTROINTESTINAL TRACT N/A 11/02/2020    Procedure: ULTRASOUND, UPPER GI TRACT, ENDOSCOPIC;  Surgeon: Maurilio Rodriguez MD;  Location: [facility] ENDO (2ND FLR);  Service: Endoscopy;  Laterality: N/A;  5 day hold Plavix, Dr Roman Walsh - pg  Covid-19 test 10/30/20 at [facility]    EPIDURAL STEROID INJECTION INTO CERVICAL SPINE N/A 12/08/2021    Procedure: Injection-steroid-epidural-cervical;  Surgeon: Socorro Lorenz MD;  Location: [facility] OR;  Service: Pain Management;  Laterality: N/A;    EPIDURAL STEROID INJECTION INTO CERVICAL SPINE N/A 11/30/2023    Procedure: Injection-steroid-epidural-cervical;  Surgeon: Maurilio Carroll MD;  Location: [facility] OR;  Service: Anesthesiology;  Laterality: N/A;  c7-T1    ESOPHAGOGASTRODUODENOSCOPY N/A 06/10/2021    Procedure: EGD (ESOPHAGOGASTRODUODENOSCOPY);  Surgeon: Sheree Metz MD;  Location: [facility];  Service: Endoscopy;  Laterality: N/A;    ESOPHAGOGASTRODUODENOSCOPY N/A 09/26/2024    Procedure: EGD (ESOPHAGOGASTRODUODENOSCOPY);  Surgeon: Jovanny Saldivar MD;  Location: [facility];  Service: Endoscopy;  Laterality: N/A;    ESOPHAGOSCOPY, USING BOUGIE, WITH DILATION N/A 01/03/2024    Procedure: ESOPHAGOSCOPY, USING BOUGIE, WITH DILATION;  Surgeon: Mir Belle MD;  Location: [facility] OR;  Service: ENT;  Laterality: N/A;    FRACTURE SURGERY Left 05/02/2022    ankle    HERNIA REPAIR      HYSTERECTOMY      INCISIONAL HERNIA REPAIR      INJECTION OF ANESTHETIC AGENT AROUND NERVE Right 05/27/2019    Procedure: RIGHT L5-S3 MEDIAL BRANCH BLOCKS;  Surgeon: Sneha Aragon MD;  Location: [facility] OR;  Service: Pain Management;  Laterality: Right;  **DO NOT STOP PLAVIX**    INJECTION OF JOINT Right 09/22/2021    Procedure: Injection, Joint; Right SI Joint  Injection;  Surgeon: Socorro Lorenz MD;  Location: [facility] OR;  Service: Pain Management;  Laterality: Right;  Oral    INSERTION OF DORSAL COLUMN NERVE STIMULATOR FOR TRIAL N/A 06/07/2021    Procedure: INSERTION, NEUROSTIMULATOR, SPINAL CORD, DORSAL COLUMN, FOR TRIAL;  Surgeon: Socorro Lorenz MD;  [facility];  Service: Pain Management;  Laterality: N/A;  nevro rep confirmed start time    INSERTION OF TUNNELED CENTRAL VENOUS CATHETER (CVC) WITH SUBCUTANEOUS PORT N/A 5/7/2025    Procedure: [id number];  Surgeon: Janet Jarrett MD;  Location: [facility] OR;  Service: General;  Laterality: N/A;    instestine      bowel section    KNEE ARTHROPLASTY Right 12/18/2019    Procedure: ARTHROPLASTY, KNEE;  Surgeon: Ike Briceño II, MD;  Location: [facility] OR;  Service: Orthopedics;  Laterality: Right;    KNEE SURGERY  1983    OOPHORECTOMY      PARATHYROID GLAND SURGERY      3 surgeries    RADIOFREQUENCY ABLATION Right 06/10/2019    Procedure: Radiofrequency Ablation - RIGHT L3-5 RADIOFREQUENCY ABLATION WITH HALYARD COOLIEF THERMAL SYSTEM;  Surgeon: Sneha Aragon MD;  Location: [facility] OR;  Service: Pain Management;  Laterality: Right;  **HOLD PLAVIX x 7 DAYS PRIOR**    REVISION OF KNEE ARTHROPLASTY Right     AVALA with Dr Singh    ROBOTIC BRONCHOSCOPY Bilateral 2/20/2025    Procedure: ROBOTIC BRONCHOSCOPY;  Surgeon: Ismael Cooper MD;  Location: [facility] OR;  Service: Pulmonary;  Laterality: Bilateral;    SPINAL CORD STIMULATOR REMOVAL N/A 02/21/2024    Procedure: REMOVAL, NEUROSTIMULATOR, SPINAL;  Surgeon: Maurilio Carroll MD;  Location: [facility] OR;  Service: Pain Management;  Laterality: N/A;  removal of spinal cord stimulator    UPPER GASTROINTESTINAL ENDOSCOPY         Family History:   Family History   Problem Relation Name Age of Onset    Stroke Maternal Grandmother      Cancer Maternal Grandfather      Stroke Mother      Heart disease Father      Breast cancer Sister         Social History:  reports that she quit  "smoking about 2 years ago. Her smoking use included cigarettes. She started smoking about 48 years ago. She has a 46 pack-year smoking history. She has never used smokeless tobacco. She reports that she does not currently use alcohol. She reports current drug use.    Allergies:  Review of patient's allergies indicates:   Allergen Reactions    Aspirin Other (See Comments)     "Makes stomach feel like it's on fire"    Phenergan [promethazine] Other (See Comments)     SEIZURES    Nickel     Lortab [hydrocodone-acetaminophen] Itching     Able to take generic Norco       Medications:  Current Medications[1]    Review of Systems   Constitutional:  Positive for fatigue. Negative for chills, fever and unexpected weight change.   Respiratory:  Positive for cough and shortness of breath.    Cardiovascular:  Negative for chest pain and palpitations.   Gastrointestinal:  Negative for abdominal pain, constipation, diarrhea, nausea and vomiting.   Musculoskeletal:  Positive for arthralgias.   Skin:  Negative for rash.   Neurological:  Negative for headaches.   Hematological:  Negative for adenopathy. Does not bruise/bleed easily.       ECOG Performance Status: 2   Objective:      Vitals:   Vitals:    07/23/25 1430   BP: 130/79   BP Location: Left arm   Patient Position: Sitting   Pulse: 73   Temp: 98.1 °F (36.7 °C)   TempSrc: Oral   SpO2: 97%   Weight: 87.4 kg (192 lb 10.9 oz)   Height: 5' 7" (1.702 m)                   Physical Exam  Constitutional:       General: She is not in acute distress.     Appearance: She is well-developed. She is not diaphoretic.   HENT:      Head: Normocephalic and atraumatic.   Cardiovascular:      Rate and Rhythm: Normal rate and regular rhythm.      Heart sounds: Normal heart sounds. No murmur heard.     No friction rub. No gallop.   Pulmonary:      Effort: Pulmonary effort is normal. No respiratory distress.      Breath sounds: Normal breath sounds. No wheezing or rales.   Chest:      Chest wall: " No tenderness.   Abdominal:      General: Bowel sounds are normal. There is no distension.      Palpations: Abdomen is soft. There is no mass.      Tenderness: There is no abdominal tenderness. There is no guarding or rebound.   Lymphadenopathy:      Cervical: No cervical adenopathy.      Upper Body:      Right upper body: No supraclavicular or axillary adenopathy.      Left upper body: No supraclavicular or axillary adenopathy.   Skin:     Findings: No erythema or rash.   Neurological:      Mental Status: She is alert and oriented to person, place, and time.   Psychiatric:         Behavior: Behavior normal.         Laboratory Data:  Lab Visit on 07/23/2025   Component Date Value Ref Range Status    Sodium 07/23/2025 142  136 - 145 mmol/L Final    Potassium 07/23/2025 4.4  3.5 - 5.1 mmol/L Final    Chloride 07/23/2025 108  95 - 110 mmol/L Final    CO2 07/23/2025 25  23 - 29 mmol/L Final    Glucose 07/23/2025 113 (H)  70 - 110 mg/dL Final    BUN 07/23/2025 20  8 - 23 mg/dL Final    Creatinine 07/23/2025 1.0  0.5 - 1.4 mg/dL Final    Calcium 07/23/2025 10.7 (H)  8.7 - 10.5 mg/dL Final    Protein Total 07/23/2025 7.1  6.0 - 8.4 gm/dL Final    Albumin 07/23/2025 3.9  3.5 - 5.2 g/dL Final    Bilirubin Total 07/23/2025 0.4  0.1 - 1.0 mg/dL Final    For infants and newborns, interpretation of results should be based   on gestational age, weight and in agreement with clinical   observations.    Premature Infant recommended reference ranges:   0-24 hours:  <8.0 mg/dL   24-48 hours: <12.0 mg/dL   3-5 days:    <15.0 mg/dL   6-29 days:   <15.0 mg/dL    ALP 07/23/2025 90  40 - 150 unit/L Final    AST 07/23/2025 19  11 - 45 unit/L Final    ALT 07/23/2025 16  10 - 44 unit/L Final    Anion Gap 07/23/2025 9  8 - 16 mmol/L Final    eGFR 07/23/2025 >60  >60 mL/min/1.73/m2 Final    Estimated GFR calculated using the CKD-EPI creatinine (2021) equation.    WBC 07/23/2025 6.69  3.90 - 12.70 K/uL Final    RBC 07/23/2025 3.47 (L)  4.00 -  5.40 M/uL Final    HGB 07/23/2025 11.3 (L)  12.0 - 16.0 gm/dL Final    HCT 07/23/2025 34.6 (L)  37.0 - 48.5 % Final    MCV 07/23/2025 100 (H)  82 - 98 fL Final    MCH 07/23/2025 32.6 (H)  27.0 - 31.0 pg Final    MCHC 07/23/2025 32.7  32.0 - 36.0 g/dL Final    RDW 07/23/2025 19.0 (H)  11.5 - 14.5 % Final    Platelet Count 07/23/2025 210  150 - 450 K/uL Final    MPV 07/23/2025 9.1 (L)  9.2 - 12.9 fL Final    Nucleated RBC 07/23/2025 0  <=0 /100 WBC Final    Neut % 07/23/2025 70.3  38 - 73 % Final    Lymph % 07/23/2025 13.3 (L)  18 - 48 % Final    Mono % 07/23/2025 12.1  4 - 15 % Final    Eos % 07/23/2025 2.7  <=8 % Final    Basophil % 07/23/2025 1.0  <=1.9 % Final    Imm Grans % 07/23/2025 0.6 (H)  0.0 - 0.5 % Final    Neut # 07/23/2025 4.70  1.8 - 7.7 K/uL Final    Lymph # 07/23/2025 0.89 (L)  1 - 4.8 K/uL Final    Mono # 07/23/2025 0.81  0.3 - 1 K/uL Final    Eos # 07/23/2025 0.18  <=0.5 K/uL Final    Baso # 07/23/2025 0.07  <=0.2 K/uL Final    Imm Grans # 07/23/2025 0.04  0.00 - 0.04 K/uL Final    Mild elevation in immature granulocytes is non specific and can be seen in a variety of conditions including stress response, acute inflammation, trauma and pregnancy. Correlation with other laboratory and clinical findings is essential.         Imaging:     CT Chest 7/02/25  Right chest port catheter tip terminates in the right atrium.     Thoracic aorta is normal caliber and contains atherosclerosis along its course. Heart is normal size. Multivessel coronary artery calcification. No pericardial effusion.     No pathologically enlarged thoracic lymph nodes.     Small aerated density along the posterior aspect of the bronchus intermedius likely representing retained secretion (series 4, image 191).     Thin bandlike densities in the right lower lobe radiating from the pleural margin with minimal surrounding ground-glass density (series 4, image 166). Findings likely represent post radiation changes. Stable 4 mm  micronodule in the right lower lobe (series 4, image 159). No new or enlarging nodule. No pleural effusion or pneumothorax.     Partially visualized hernia in the midline upper ventral abdominal wall. Cholecystectomy.     Partially visualized operative changes of cervical fusion. Degenerative changes of the spine. No acute or aggressive bony abnormality.       Assessment:       1. Cancer of lower lobe of right lung    2. Other specified disorders involving the immune mechanism, not elsewhere classified    3. Immunodeficiency due to chemotherapy    4. MEN (multiple endocrine neoplasia)    5. Pancreatic insufficiency    6. Arachnoid membrane inflammation    7. Hypothyroidism, unspecified type    8. Hypertension, unspecified type    9. Macrocytic anemia                         Plan:     NSCLC - Pt with stage IIIa pT1cN2 NSCLC SCC of the RLL with met to station 7 LN  -TEMPUS tissue testing showed mutations in CDKN2A, FAT1, and TP53.  PD-L1 was <1%  -MRI brain KRISHNA on 3/07/25  -Plan for Carboplatin and Paclitaxel with XRT followed by year treatment with Durvalumab  -Pt completed 6 cycles total  -Radiation completed 07/02/2025  -CT chest on 07/02/2025 shows radiation changes and no obvious signs of malignancy   -Patient to start on durvalumab cycle 1  Visit today included increased complexity associated with the care of the episodic problem (Immunotherapy) addressed and managing the longitudinal care of the patient due to the serious and/or complex managed problem(s) NSCLC.    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  -Dr Staples 4/15/25 felt the patient needed fenestration with neurosurgery  -Pt following with Dr Freddy Burch    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 Pt can proceed with treatment.  Pt needs a CBC, CMP, TSH in 4 weeks with an appt with NP the day after lasb with treatment.  Pt needs the same labs in 8 weeks with an appt with me the day after labs with treatment.  Pt would like her appts to be in the morning no earlier that 10AM.   Follow up with [facility] 4 weeks.   Infusion scheduling note    Injection scheduling note    Labs    Imaging    Pharmacy appointment    Other referrals              Treatment Plan Information   OP durvalumab 1500mg Q4W Sea William MD   Associated diagnosis: Cancer of lower lobe of right lung  cT1c, cN2(f), cM0 noted on 3/14/2025   Line of treatment: Adjuvant  Treatment Goal: Curative     Upcoming Treatment Dates - OP durvalumab 1500mg Q4W    8/20/2025       Chemotherapy       durvalumab (IMFINZI) 1,500 mg in 0.9% NaCl SolP 280 mL chemo infusion  9/17/2025       Chemotherapy       durvalumab (IMFINZI) 1,500 mg in 0.9% NaCl SolP 280 mL chemo infusion  10/15/2025       Chemotherapy       durvalumab (IMFINZI) 1,500 mg in 0.9% NaCl SolP 280 mL chemo infusion  11/12/2025       Chemotherapy       durvalumab (IMFINZI) 1,500 mg  in 0.9% NaCl SolP 280 mL chemo infusion    Supportive Plan Information  IV FLUIDS AND ELECTROLYTES Sea William MD   Associated Diagnosis: Cancer related pain   noted on 6/9/2025   Line of treatment: Supportive Care   Treatment goal: Supportive     Upcoming Treatment Dates - IV FLUIDS AND ELECTROLYTES    No upcoming days in selected categories.    Therapy Plan Information  INF FLUIDS for Dehydration, noted on 6/24/2025  IV Fluids  sodium chloride 0.9% bolus 1,000 mL 1,000 mL  1,000 mL, Intravenous, Every visit  Flushes  sodium chloride 0.9% flush 10 mL  10 mL, Intravenous, PRN  heparin, porcine (PF) 100 unit/mL injection flush 500 Units  500 Units, Intravenous, PRN      No therapy plan of the specified type found.    No therapy plan of the specified type found.      Sea William MD  [facility]  Hematology and Oncology  [facility]   [address] [place] [address]   O: [phone]  F: [phone]                           [1]   Current Outpatient Medications   Medication Sig Dispense Refill    ([facility]s Solution) 1:1:1:1 diphenhydrAMINE, aluminum-magnesium hydroxide-simethicone (Mylanta), LIDOcaine viscous, nystatin oral solution Take 10 mLs by mouth 4 (four) times daily.      acetaminophen (TYLENOL) 325 MG tablet Take 2 tablets (650 mg total) by mouth every 6 (six) hours as needed for Pain (Do not take with any other tylenol containing products).  0    allopurinoL (ZYLOPRIM) 300 MG tablet TAKE 1 TABLET DAILY 90 tablet 1    amLODIPine (NORVASC) 5 MG tablet TAKE 1 TABLET(5 MG) BY MOUTH TWICE DAILY 180 tablet 1    busPIRone (BUSPAR) 5 MG Tab Take 1 tablet (5 mg total) by mouth 2 (two) times daily. 180 tablet 3    calcium carbonate (CALCIUM 500) 500 mg calcium (1,250 mg) chewable tablet Take 1 tablet by mouth once daily.      clopidogreL (PLAVIX) 75 mg tablet Take 1 tablet (75 mg total) by mouth once daily. 90 tablet 3    colestipoL (COLESTID) 1 gram Tab TAKE 2 TABLETS  ONCE DAILY 180 tablet 1    cyanocobalamin 1,000 mcg/mL injection INJECT 1 ML IN THE MUSCLE EVERY 14 DAYS 10 mL 0    diphenhydrAMINE-aluminum-magnesium hydroxide-simethicone-LIDOcaine viscous HCl 2% Swish and spit 15 mLs every 4 (four) hours as needed. 500 each 3    EScitalopram oxalate (LEXAPRO) 20 MG tablet TAKE 1 TABLET EVERY EVENING 90 tablet 1    fluticasone propionate (FLONASE) 50 mcg/actuation nasal spray 2 sprays (100 mcg total) by Each Nostril route 2 (two) times daily. 48 g 3    furosemide (LASIX) 20 MG tablet TAKE 1 TABLET(20 MG) BY MOUTH EVERY DAY 90 tablet 3    gabapentin (NEURONTIN) 400 MG capsule Take 1 capsule (400 mg total) by mouth 2 (two) times daily. 180 capsule 3    hydrALAZINE (APRESOLINE) 25 MG tablet Take 1 tablet (25 mg total) by mouth daily as needed (Check BP after taking amlodipine, Lasix, metoprolol.  If it continues to stay greater than 160/100 take hydralazine). 30 tablet 0    levothyroxine (SYNTHROID) 75 MCG tablet TAKE 1 TABLET DAILY 90 tablet 1    LIDOcaine-prilocaine (EMLA) cream Apply topically as needed (Apply 30 minutes prior to port access). 30 g 2    magic mouthwash diphen/antac/lidoc/nysta Take 10 mLs by mouth 4 (four) times daily. 240 mL 1    magnesium oxide (MAG-OX) 400 mg (241.3 mg magnesium) tablet Take 1 tablet (400 mg total) by mouth once daily.      methocarbamoL (ROBAXIN) 750 MG Tab Take 1 tablet (750 mg total) by mouth every 8 (eight) hours as needed (muscle spasm). 270 tablet 3    metoprolol succinate (TOPROL-XL) 50 MG 24 hr tablet Take 1 tablet (50 mg total) by mouth once daily. Pt takes 1/2 tab daily 90 tablet 2    mupirocin (BACTROBAN) 2 % ointment Apply topically once daily.      oxyCODONE-acetaminophen (PERCOCET)  mg per tablet Take 1 tablet by mouth every 8 (eight) hours as needed.      oxyCODONE-acetaminophen (PERCOCET) 5-325 mg per tablet Take 1 tablet by mouth every 8 (eight) hours as needed for Pain.      pantoprazole (PROTONIX) 40 MG tablet TAKE 1  TABLET DAILY 90 tablet 3    potassium chloride SA (K-DUR,KLOR-CON M) 10 MEQ tablet Take 1 tablet (10 mEq total) by mouth once daily. 90 tablet 3    rosuvastatin (CRESTOR) 10 MG tablet TAKE 1 TABLET DAILY 90 tablet 3    sucralfate (CARAFATE) 1 gram tablet Take 1 tablet (1 g total) by mouth 4 (four) times daily before meals and nightly. 120 tablet 3    traMADoL (ULTRAM) 50 mg tablet Take 1 tablet (50 mg total) by mouth every 6 (six) hours as needed for Pain. 10 tablet 0    traZODone (DESYREL) 100 MG tablet TAKE 1 TABLET EVERY EVENING 90 tablet 3    triamcinolone acetonide 0.1% (KENALOG) 0.1 % ointment Apply topically 2 (two) times daily.      vit A/vit C/vit E/zinc/copper (PRESERVISION AREDS ORAL) Take 1 capsule by mouth 2 (two) times a day.      vitamin D (VITAMIN D3) 1000 units Tab Take 1,000 Units by mouth 2 (two) times a day.       No current facility-administered medications for this visit.     Facility-Administered Medications Ordered in Other Visits   Medication Dose Route Frequency Provider Last Rate Last Admin    alteplase injection 2 mg  2 mg Intra-Catheter PRN Sea William MD        D5W 250 mL flush bag   Intravenous 1 time in Clinic/HOD Sea William MD        diphenhydrAMINE injection 50 mg  50 mg Intravenous Once PRN Sea William MD        EPINEPHrine (EPIPEN) 0.3 mg/0.3 mL pen injection 0.3 mg  0.3 mg Intramuscular Once PRN Sea William MD        heparin, porcine (PF) 100 unit/mL injection flush 500 Units  500 Units Intravenous PRN Sea William MD        hydrocortisone sodium succinate injection 100 mg  100 mg Intravenous Once PRN Sea William MD        ondansetron injection 8 mg  8 mg Intravenous Once PRN Sea William MD        oxyCODONE immediate release tablet 5 mg  5 mg Oral Once PRN Paul Nunez MD        sodium chloride 0.9% flush 10 mL  10 mL Intravenous PRN Sea William MD   10 mL at 07/23/25 1193

## 2025-07-23 ENCOUNTER — INFUSION (OUTPATIENT)
Dept: INFUSION THERAPY | Facility: HOSPITAL | Age: 70
End: 2025-07-23
Attending: INTERNAL MEDICINE
Payer: MEDICARE

## 2025-07-23 ENCOUNTER — OFFICE VISIT (OUTPATIENT)
Dept: HEMATOLOGY/ONCOLOGY | Facility: CLINIC | Age: 70
End: 2025-07-23
Payer: MEDICARE

## 2025-07-23 ENCOUNTER — OFFICE VISIT (OUTPATIENT)
Dept: PSYCHIATRY | Facility: CLINIC | Age: 70
End: 2025-07-23
Payer: MEDICARE

## 2025-07-23 VITALS
DIASTOLIC BLOOD PRESSURE: 79 MMHG | OXYGEN SATURATION: 97 % | TEMPERATURE: 98 F | SYSTOLIC BLOOD PRESSURE: 130 MMHG | HEART RATE: 73 BPM | BODY MASS INDEX: 30.24 KG/M2 | HEIGHT: 67 IN | WEIGHT: 192.69 LBS

## 2025-07-23 VITALS
DIASTOLIC BLOOD PRESSURE: 72 MMHG | HEART RATE: 71 BPM | SYSTOLIC BLOOD PRESSURE: 122 MMHG | HEIGHT: 67 IN | OXYGEN SATURATION: 97 % | BODY MASS INDEX: 30.24 KG/M2 | TEMPERATURE: 98 F | WEIGHT: 192.69 LBS | RESPIRATION RATE: 18 BRPM

## 2025-07-23 DIAGNOSIS — C34.31 CANCER OF LOWER LOBE OF RIGHT LUNG: Primary | ICD-10-CM

## 2025-07-23 DIAGNOSIS — T45.1X5A IMMUNODEFICIENCY DUE TO CHEMOTHERAPY: ICD-10-CM

## 2025-07-23 DIAGNOSIS — E31.20 MEN (MULTIPLE ENDOCRINE NEOPLASIA): ICD-10-CM

## 2025-07-23 DIAGNOSIS — F43.22 ADJUSTMENT DISORDER WITH ANXIOUS MOOD: Primary | ICD-10-CM

## 2025-07-23 DIAGNOSIS — I10 HYPERTENSION, UNSPECIFIED TYPE: ICD-10-CM

## 2025-07-23 DIAGNOSIS — E03.9 HYPOTHYROIDISM, UNSPECIFIED TYPE: ICD-10-CM

## 2025-07-23 DIAGNOSIS — G03.9: ICD-10-CM

## 2025-07-23 DIAGNOSIS — Z79.69 IMMUNODEFICIENCY DUE TO CHEMOTHERAPY: ICD-10-CM

## 2025-07-23 DIAGNOSIS — D53.9 MACROCYTIC ANEMIA: ICD-10-CM

## 2025-07-23 DIAGNOSIS — D84.821 IMMUNODEFICIENCY DUE TO CHEMOTHERAPY: ICD-10-CM

## 2025-07-23 DIAGNOSIS — C34.90 MALIGNANT NEOPLASM OF UNSPECIFIED PART OF UNSPECIFIED BRONCHUS OR LUNG: ICD-10-CM

## 2025-07-23 DIAGNOSIS — K86.89 PANCREATIC INSUFFICIENCY: ICD-10-CM

## 2025-07-23 DIAGNOSIS — D89.89 OTHER SPECIFIED DISORDERS INVOLVING THE IMMUNE MECHANISM, NOT ELSEWHERE CLASSIFIED: ICD-10-CM

## 2025-07-23 PROCEDURE — 99215 OFFICE O/P EST HI 40 MIN: CPT | Mod: PBBFAC,25,PN | Performed by: INTERNAL MEDICINE

## 2025-07-23 PROCEDURE — 99999 PR PBB SHADOW E&M-EST. PATIENT-LVL V: CPT | Mod: PBBFAC,,, | Performed by: INTERNAL MEDICINE

## 2025-07-23 PROCEDURE — 63600175 PHARM REV CODE 636 W HCPCS: Mod: JZ,TB,PN | Performed by: INTERNAL MEDICINE

## 2025-07-23 PROCEDURE — 96413 CHEMO IV INFUSION 1 HR: CPT | Mod: PN

## 2025-07-23 PROCEDURE — A4216 STERILE WATER/SALINE, 10 ML: HCPCS | Mod: PN | Performed by: INTERNAL MEDICINE

## 2025-07-23 PROCEDURE — 25000003 PHARM REV CODE 250: Mod: PN | Performed by: INTERNAL MEDICINE

## 2025-07-23 RX ORDER — EPINEPHRINE 0.3 MG/.3ML
0.3 INJECTION SUBCUTANEOUS ONCE AS NEEDED
Status: CANCELLED | OUTPATIENT
Start: 2025-07-23

## 2025-07-23 RX ORDER — HEPARIN 100 UNIT/ML
500 SYRINGE INTRAVENOUS
Status: DISCONTINUED | OUTPATIENT
Start: 2025-07-23 | End: 2025-07-23 | Stop reason: HOSPADM

## 2025-07-23 RX ORDER — DIPHENHYDRAMINE HYDROCHLORIDE 50 MG/ML
50 INJECTION, SOLUTION INTRAMUSCULAR; INTRAVENOUS ONCE AS NEEDED
Status: CANCELLED | OUTPATIENT
Start: 2025-07-23

## 2025-07-23 RX ORDER — ONDANSETRON HYDROCHLORIDE 2 MG/ML
8 INJECTION, SOLUTION INTRAVENOUS ONCE AS NEEDED
Status: DISCONTINUED | OUTPATIENT
Start: 2025-07-23 | End: 2025-07-23 | Stop reason: HOSPADM

## 2025-07-23 RX ORDER — HEPARIN 100 UNIT/ML
500 SYRINGE INTRAVENOUS
Status: CANCELLED | OUTPATIENT
Start: 2025-07-23

## 2025-07-23 RX ORDER — SODIUM CHLORIDE 0.9 % (FLUSH) 0.9 %
10 SYRINGE (ML) INJECTION
Status: DISCONTINUED | OUTPATIENT
Start: 2025-07-23 | End: 2025-07-23 | Stop reason: HOSPADM

## 2025-07-23 RX ORDER — ONDANSETRON HYDROCHLORIDE 2 MG/ML
8 INJECTION, SOLUTION INTRAVENOUS ONCE AS NEEDED
Status: CANCELLED
Start: 2025-07-23

## 2025-07-23 RX ORDER — SODIUM CHLORIDE 0.9 % (FLUSH) 0.9 %
10 SYRINGE (ML) INJECTION
Status: CANCELLED | OUTPATIENT
Start: 2025-07-23

## 2025-07-23 RX ORDER — DIPHENHYDRAMINE HYDROCHLORIDE 50 MG/ML
50 INJECTION, SOLUTION INTRAMUSCULAR; INTRAVENOUS ONCE AS NEEDED
Status: DISCONTINUED | OUTPATIENT
Start: 2025-07-23 | End: 2025-07-23 | Stop reason: HOSPADM

## 2025-07-23 RX ORDER — EPINEPHRINE 0.3 MG/.3ML
0.3 INJECTION SUBCUTANEOUS ONCE AS NEEDED
Status: DISCONTINUED | OUTPATIENT
Start: 2025-07-23 | End: 2025-07-23 | Stop reason: HOSPADM

## 2025-07-23 RX ADMIN — SODIUM CHLORIDE, PRESERVATIVE FREE 10 ML: 5 INJECTION INTRAVENOUS at 04:07

## 2025-07-23 RX ADMIN — SODIUM CHLORIDE 1500 MG: 9 INJECTION, SOLUTION INTRAVENOUS at 03:07

## 2025-07-23 RX ADMIN — SODIUM CHLORIDE: 9 INJECTION, SOLUTION INTRAVENOUS at 03:07

## 2025-07-23 NOTE — PROGRESS NOTES
PSYCHO-ONCOLOGY NOTE/ Individual Psychotherapy     Date: 7/23/2025   Site:  [facility]      Therapeutic Intervention: Met with patient.  Outpatient - Insight oriented psychotherapy 60 min - CPT code 32882 and Outpatient - Supportive psychotherapy 60 min - CPT Code 31741    This includes face to face time and non-face to face time preparing to see the patient, obtaining and/or reviewing separately obtained history, documenting clinical information in the electronic or other health record, independently interpreting results and communicating results to the patient/family/caregiver, or care coordinator.      Patient was last seen by me on 7/1/2025    Problem list  Patient Active Problem List   Diagnosis    CHF, chronic    Hypertension    COPD (chronic obstructive pulmonary disease)    Nicotine dependence    Diarrhea, chronic    Allergic rhinitis    Mild episode of recurrent major depressive disorder    Fibromyalgia    GERD (gastroesophageal reflux disease)    Hypothyroidism    Hyperlipidemia    History of malignant neoplasm of skin    MEN (multiple endocrine neoplasia)    Peripheral neuropathy    Poikiloderma    Pseudophakia    Rotator cuff injury    Tendinosis    Metatarsalgia    Dry eyes    Class 1 obesity due to excess calories with serious comorbidity and body mass index (BMI) of 30.0 to 30.9 in adult, today 29.1    Chronic bilateral low back pain without sciatica    Contusion of tail of pancreas    Bile duct abnormality    Gastritis    Lumbosacral spondylosis    S/P drug eluting coronary stent placement, 8/2017    Family history of premature CAD    Syncope and collapse, onset 2015, about 10 times, one episode noted hypotension at doctor office 9/2017    Multiple falls, started 9/2018, 5 times usually on standing    Excessive caffeine intake    Orthostatic hypotension    Abdominal obesity    Aspirin intolerance    LVH (left ventricular hypertrophy) due to hypertensive disease, with heart failure    Chronic  diarrhea    History of cholecystectomy    Diverticulosis of large intestine without hemorrhage    Abnormal computerized tomography of biliary tract    History of pancreatitis    Primary osteoarthritis of right knee    Degenerative tear of medial meniscus of right knee    Baker's cyst of knee, right    Chronic pain of right knee    Unilateral primary osteoarthritis, right knee    CAD (coronary artery disease)    History of TIA (transient ischemic attack)    Status post right knee replacement    Normocytic anemia    Bradycardia    Chronic pancreatitis    Colon cancer screening    B12 deficiency    History of Clostridium difficile infection    Abnormal finding on GI tract imaging    Pancreatic insufficiency    Degenerative disc disease, lumbar    Current smoker    Failed back syndrome of lumbar spine    Activity intolerance    Sacroiliitis    Hypothyroidism due to acquired atrophy of thyroid    Pain of lumbar facet joint    Non-refractory chronic migraine without aura    Lumbar radiculopathy    Isolated cervical dystonia    Gout    Generalized anxiety disorder    Stage 3b chronic kidney disease    Pharyngoesophageal dysphagia    Hoarseness, persistent    Chronic bronchitis    Bilateral carpal tunnel syndrome    Major depressive disorder, single episode, moderate    Aortic atherosclerosis    Impaired functional mobility and activity tolerance    Myofascial pain syndrome of lumbar spine    Myofascial pain syndrome, cervical    Solitary pulmonary nodule    Irritable bowel syndrome with diarrhea    Cancer of lower lobe of right lung    Malignant neoplasm of unspecified part of unspecified bronchus or lung    Rash    Chemotherapy-induced fatigue    Other low back pain    Mucositis due to chemotherapy    Immunodeficiency due to chemotherapy    Neutropenia    Epistaxis    Cancer related pain    Pancytopenia    Dehydration       Chief complaint/reason for encounter: anxiety   Met with patient to evaluate psychosocial  adaptation to diagnosis/treatment/survivorship of lung cancer.     Current Medications  Current Outpatient Medications   Medication    ([facility]s Solution) 1:1:1:1 diphenhydrAMINE, aluminum-magnesium hydroxide-simethicone (Mylanta), LIDOcaine viscous, nystatin oral solution    acetaminophen (TYLENOL) 325 MG tablet    allopurinoL (ZYLOPRIM) 300 MG tablet    amLODIPine (NORVASC) 5 MG tablet    busPIRone (BUSPAR) 5 MG Tab    calcium carbonate (CALCIUM 500) 500 mg calcium (1,250 mg) chewable tablet    clopidogreL (PLAVIX) 75 mg tablet    colestipoL (COLESTID) 1 gram Tab    cyanocobalamin 1,000 mcg/mL injection    diphenhydrAMINE-aluminum-magnesium hydroxide-simethicone-LIDOcaine viscous HCl 2%    EScitalopram oxalate (LEXAPRO) 20 MG tablet    fluticasone propionate (FLONASE) 50 mcg/actuation nasal spray    furosemide (LASIX) 20 MG tablet    gabapentin (NEURONTIN) 400 MG capsule    hydrALAZINE (APRESOLINE) 25 MG tablet    levothyroxine (SYNTHROID) 75 MCG tablet    LIDOcaine-prilocaine (EMLA) cream    magic mouthwash diphen/antac/lidoc/nysta    magnesium oxide (MAG-OX) 400 mg (241.3 mg magnesium) tablet    methocarbamoL (ROBAXIN) 750 MG Tab    metoprolol succinate (TOPROL-XL) 50 MG 24 hr tablet    mupirocin (BACTROBAN) 2 % ointment    oxyCODONE-acetaminophen (PERCOCET)  mg per tablet    oxyCODONE-acetaminophen (PERCOCET) 5-325 mg per tablet    pantoprazole (PROTONIX) 40 MG tablet    potassium chloride SA (K-DUR,KLOR-CON M) 10 MEQ tablet    rosuvastatin (CRESTOR) 10 MG tablet    sucralfate (CARAFATE) 1 gram tablet    traMADoL (ULTRAM) 50 mg tablet    traZODone (DESYREL) 100 MG tablet    triamcinolone acetonide 0.1% (KENALOG) 0.1 % ointment    vit A/vit C/vit E/zinc/copper (PRESERVISION AREDS ORAL)    vitamin D (VITAMIN D3) 1000 units Tab     No current facility-administered medications for this visit.     Facility-Administered Medications Ordered in Other Visits   Medication Frequency    oxyCODONE immediate release  tablet 5 mg Once PRN       ONCOLOGY HISTORY  Oncology History   Cancer of lower lobe of right lung   3/14/2025 Initial Diagnosis    Cancer of lower lobe of right lung     3/14/2025 Cancer Staged    Staging form: Lung, AJCC V9  - Clinical stage from 3/14/2025: cT1c, cN2(f), cM0     5/12/2025 - 6/16/2025 Chemotherapy    Treatment Summary   Plan Name: OP NSCLC PACLitaxel CARBOplatin QW + radiation  Treatment Goal: Curative  Status: Inactive  Start Date: 5/12/2025  End Date: 6/16/2025  Provider: Sea William MD  Chemotherapy: CARBOplatin (PARAPLATIN) 205 mg in 0.9% NaCl 305.5 mL chemo infusion, 200 mg, Intravenous, Clinic/HOD 1 time, 6 of 7 cycles  Administration: 205 mg (5/12/2025), 200 mg (5/19/2025), 200 mg (5/26/2025), 220 mg (6/2/2025), 220 mg (6/9/2025), 220 mg (6/16/2025)  PACLitaxeL (TAXOL) 45 mg/m2 = 96 mg in 0.9% NaCl 250 mL chemo infusion, 45 mg/m2 = 90 mg, Intravenous, Clinic/HOD 1 time, 6 of 7 cycles  Administration: 96 mg (5/12/2025), 90 mg (5/19/2025), 90 mg (5/26/2025), 90 mg (6/2/2025), 90 mg (6/9/2025), 96 mg (6/16/2025)     5/12/2025 - 7/2/2025 Radiation Therapy    Treatment Site Ref. ID Energy Dose/Fx (Gy) #Fx Dose Correction (Gy) Total Dose (Gy) Start Date End Date Elapsed Days   IM Lung R iGTV 6X 2.2 30 / 30 0 66 5/12/2025 7/2/2025 51     Treating physician: Li     7/23/2025 -  Chemotherapy    Treatment Summary   Plan Name: OP durvalumab 1500mg Q4W  Treatment Goal: Curative  Status: Active  Start Date: 7/23/2025 (Planned)  End Date: 6/24/2026 (Planned)  Provider: Sea William MD  Chemotherapy: [No matching medication found in this treatment plan]         Objective:  Alessia Nelson arrived  promptly for the session.   Ms. Nelson was independently ambulatory at the time of session. The patient was fully cooperative throughout the session.  Appearance: age appropriate, appropriately  dressed, adequately  groomed  Behavior/Cooperation: friendly and cooperative  Speech: normal in  rate, volume, and tone and appropriate quality, quantity and organization of sentences  Mood: steady  Affect: mood congruent  Thought Process: goal-directed, logical  Thought Content: normal,  No delusions or paranoia; did not appear to be responding to internal stimuli during the session  Orientation: grossly intact  Memory: grossly intact  Attention Span/Concentration: Attends to session without distraction; reports no difficulty  Fund of Knowledge: average  Estimate of Intelligence: average from verbal skills and history  Cognition: grossly intact  Insight: patient has awareness of illness; good insight into own behavior and behavior of others  Judgment: the patient's behavior is adequate to circumstances    NCCN Distress thermometer:       7/23/2025     2:33 PM 7/21/2025     2:11 PM 7/21/2025     1:29 PM 7/7/2025    10:34 AM 6/24/2025     1:02 PM 6/9/2025     9:32 AM 6/2/2025     9:03 AM   DISTRESS SCREENING   Distress Score 6 7 0 - No Distress 0 - No Distress 7 6 4   Practical Concerns None of these None of these None of these None of these None of these None of these None of these   Social Concerns None of these None of these None of these None of these None of these None of these None of these   Emotional Concerns None of these Worry or anxiety None of these None of these None of these None of these None of these   Spiritual or Muslim Concerns None of these None of these None of these None of these None of these None of these None of these   Physical Concerns Fatigue;Sleep Pain None of these Fatigue Pain;Fatigue;Changes in eating;Loss or change of physical abilities Pain Fatigue   Other Problems      last monday after chemo patient states she lost her balance and had trouble walking; hair loss concern; having problems with her stomach heavy fatigue from raidiation; still having nose bleeds -- worried it could be clots        Interval history and content of current session: Pt provided update regarding  diagnosis and ongoing treatment. She described persistent fatigue related to treatment and noted feeling down due to limited energy for daily activities. Additionally, she expressed nervousness about her upcoming neurosurgery consultation to determine timing for back surgery. Supportive therapeutic techniques were used to validate her emotional experience and explore her anxiety about the appointment. Pt reflected on the distress of not knowing the treatment plan or potential side effects, sharing that the uncertainty makes it difficult to prepare or know what to expect. ACT-based strategies for self-compassion were introduced to support her in managing current challenges. Pt also discussed the difficulty of coping with these stressors without consistent family support. Socratic and open-ended questions guided reflection on the meaning of family and how she maintains connection with relatives she feels close to. She shared that she remains connected with her nieces and does what she can for them, though she wishes they lived closer. In future sessions, pt expressed interest in exploring ongoing frustration in her relationships with her sisters and mother.      Risk parameters:   Patient reports no suicidal ideation  Patient reports no homicidal ideation  Patient reports no self-injurious behavior  Patient reports no violent behavior                 Safety needs:  None at this time                            Verbal deficits: None                 Patient's response to intervention:The patient's response to intervention is accepting.                 Progress toward goals and other mental status changes:  The patient's progress toward goals is good.                            Progress to date:Progress as Expected                            Patient Strengths: verbal, intelligent, successful, good insight, commitment to wellness, strong fam     Treatment Plan:individual psychotherapy  Target symptoms: anxiety ,  adjustment  Why chosen therapy is appropriate versus another modality: relevant to diagnosis  Outcome monitoring methods: self-report, observation, checklist/rating scale  Therapeutic intervention type: insight oriented psychotherapy, behavior modifying psychotherapy, supportive psychotherapy  Prognosis: Good      Return to clinic: as scheduled     Length of Service (minutes direct face-to-face contact): 60    Diagnosis:     ICD-10-CM ICD-9-CM   1. Adjustment disorder with anxious mood  F43.22 309.24   2. Malignant neoplasm of unspecified part of unspecified bronchus or lung  C34.90 162.9        Tiffanie Soliz PsyD   Clinical Health Psychology Fellow

## 2025-07-23 NOTE — PLAN OF CARE
Problem: Adult Inpatient Plan of Care  Goal: Plan of Care Review  Outcome: Progressing  Flowsheets (Taken 7/23/2025 1617)  Plan of Care Reviewed With: patient  Goal: Patient-Specific Goal (Individualized)  Outcome: Progressing  Flowsheets (Taken 7/23/2025 1617)  Individualized Care Needs: snacks, blanket, recliner  Anxieties, Fears or Concerns: new infusion starts today  Patient/Family-Specific Goals (Include Timeframe): no change in status     Problem: Fatigue  Goal: Improved Activity Tolerance  Outcome: Progressing  Intervention: Promote Improved Energy  Flowsheets (Taken 7/23/2025 1617)  Fatigue Management: paced activity encouraged  Sleep/Rest Enhancement:   natural light exposure provided   noise level reduced   relaxation techniques promoted  Activity Management:   Ambulated -L4   Ambulated in roman - L4   Up in chair - L3  Environmental Support: calm environment promoted     Pt tolerated infusion, NAD noted. VSS. Pt d/c, left unit ambulatory.

## 2025-07-25 NOTE — ASSESSMENT & PLAN NOTE
Chemotherapy school   Treatment plan of OP durvalumab 1500mg Q4W discussed with patient. Reviewed duration of treatment and schedule with patient. Provided calendar with appointments.   Handouts provided on chemotherapy agents. Premeds, supportive meds explained.  Discussed the mechanism of action, potential side effects of this treatment as well as ways to manage them at home.   Dietary modifications discussed. Detailed instructions to be provided by dietician.   Chemotherapy folder supplied with contact information and additional educational material.   Discussed follow-up with the physician for toxicity monitoring throughout treatment.    Cycle 1, Day 1 scheduled for 7/23/2025; home prescriptions sent to patient's preferred pharmacy   Informed consent obtained and documented electronically.   Chemotherapy care companion enrollment: status active   Encouraged to call office - phone number provided - to assist with any concerns.

## 2025-07-31 ENCOUNTER — CLINICAL SUPPORT (OUTPATIENT)
Dept: REHABILITATION | Facility: HOSPITAL | Age: 70
End: 2025-07-31
Payer: MEDICARE

## 2025-07-31 DIAGNOSIS — Z74.09 IMPAIRED FUNCTIONAL MOBILITY AND ACTIVITY TOLERANCE: Primary | ICD-10-CM

## 2025-07-31 PROCEDURE — 97530 THERAPEUTIC ACTIVITIES: CPT | Mod: PN

## 2025-07-31 PROCEDURE — 97161 PT EVAL LOW COMPLEX 20 MIN: CPT | Mod: PN

## 2025-07-31 NOTE — PROGRESS NOTES
Outpatient Rehab    Physical Therapy Evaluation    Patient Name: Alessia Nelson  MRN: [id number]  YOB: 1955  Encounter Date: 7/31/2025    Therapy Diagnosis:   Encounter Diagnosis   Name Primary?    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   FOTO report - see Media section or FOTO account episode details.     Intake Score (%): Not applicable for this Episode    Precautions:       Subjective   History of Present Illness  Alessia is a 69 y.o. female      The patient reports a medical diagnosis of C34.31 (ICD-10-CM) - Cancer of lower lobe of right lung.                      History of Present Condition/Illness: Pt reports she has to get back into shape. Pt reports at the end of May she started chemo and radiation which has whipped her out. Pt reports she also has a cyst on her spine that has to be removed but the surgery had to be postponed due to chancer treatment. Pt has to undergo immotherapy for a year. Pt has been seeing a psychotherapist to assist with processing everything. Pt reports she has a hard time doing every day tasks. She is unable to lift heavy stuff.          Activities of Daily Living  Social history was obtained from Patient.    General Prior Level of Function Comments: I  General Current Level of  Function Comments: need assistances with tasks       Previously independent with activities of daily living? Yes     Currently independent with activities of daily living? No              Pain     Patient reports a current pain level of 7/10. Pain at best is reported as 7/10. Pain at worst is reported as 7/10.   Location: back pain and entire body  Clinical Progression (since onset): Stable  Pain Qualities: Aching, Throbbing, Burning         Review of Systems  Patient reports: Cancer History  Patient denies: Cardiac History and Diabetes        Treatment History  Treatments  Previously Received Treatments: Yes  Previous Treatments: Physical therapy  Currently Receiving Treatments: No    Living Arrangements  Living Situation  Housing: Home independently  Living Arrangements: Alone  Support Systems: Friends/neighbors    Home Setup  Type of Structure: House  Number of Levels in Home: One level        Employment  Does the patient's condition impact their ability to work?: Yes  Employment Status: Retired  Criminal justice       Past Medical History/Physical Systems Review:   Alessia Nelson  has a past medical history of Acute pancreatitis, Anticoagulant long-term use, Back pain, CAD (coronary artery disease), CHF (congestive heart failure), COPD (chronic obstructive pulmonary disease), Depression, Diverticulosis, Encounter for blood transfusion, GERD (gastroesophageal reflux disease), History of blood clots, History of bowel resection, Hyperlipidemia, Hypertension, Malignant neoplasm of unspecified part of unspecified bronchus or lung, Peritonitis, PONV (postoperative nausea and vomiting), Pulmonary nodule, Seizures, Stroke, Thyroid disease, TIA (transient ischemic attack), and Wears dentures.    Alessia Nelson  has a past surgical history that includes Parathyroid gland surgery; instestine; Hernia repair; Hysterectomy; Cholecystectomy; Adrenal gland surgery; Colonoscopy; Upper gastrointestinal endoscopy; Incisional  hernia repair; Injection of anesthetic agent around nerve (Right, 05/27/2019); Radiofrequency ablation (Right, 06/10/2019); Coronary stent placement; Appendectomy; Back surgery; Cystourethroscopy (N/A, 11/13/2019); Endoscopic ultrasound of upper gastrointestinal tract (N/A, 11/25/2019); Knee surgery (1983); Knee Arthroplasty (Right, 12/18/2019); Endoscopic ultrasound of upper gastrointestinal tract (N/A, 11/02/2020); Oophorectomy; Caudal epidural steroid injection (N/A, 03/01/2021); Insertion of dorsal column nerve stimulator for trial (N/A, 06/07/2021); Esophagogastroduodenoscopy (N/A, 06/10/2021); Colonoscopy (N/A, 06/10/2021); Injection of joint (Right, 09/22/2021); Epidural steroid injection into cervical spine (N/A, 12/08/2021); Endoscopic ultrasound of lower gastrointestinal tract (N/A, 03/08/2022); Colonoscopy (N/A, 03/08/2022); Fracture surgery (Left, 05/02/2022); Epidural steroid injection into cervical spine (N/A, 11/30/2023); Direct diagnostic laryngoscopy with bronchoscopy and esophagoscopy (N/A, 01/03/2024); esophagoscopy, using bougie, with dilation (N/A, 01/03/2024); Spinal cord stimulator removal (N/A, 02/21/2024); Esophagogastroduodenoscopy (N/A, 09/26/2024); Revision of knee arthroplasty (Right); robotic bronchoscopy (Bilateral, 2/20/2025); Endobronchial ultrasound (Bilateral, 2/20/2025); and Insertion of tunneled central venous catheter (CVC) with subcutaneous port (N/A, 5/7/2025).    Alessia has a current medication list which includes the following prescription(s): nystatin, acetaminophen, allopurinol, amlodipine, buspirone, calcium carbonate, clopidogrel, colestipol, cyanocobalamin, diphenhydrAMINE-aluminum-magnesium hydroxide-simethicone-LIDOcaine viscous HCl 2%, escitalopram oxalate, fluticasone propionate, furosemide, gabapentin, hydralazine, levothyroxine, lidocaine-prilocaine, magic mouthwash diphen/antac/lidoc/nysta, magnesium oxide, methocarbamol, metoprolol succinate, mupirocin,  "oxycodone-acetaminophen, oxycodone-acetaminophen, pantoprazole, potassium chloride sa, rosuvastatin, sucralfate, tramadol, trazodone, triamcinolone acetonide 0.1%, vit a/vit c/vit e/zinc/copper, and vitamin d, and the following Facility-Administered Medications: oxycodone.    Review of patient's allergies indicates:   Allergen Reactions    Aspirin Other (See Comments)     "Makes stomach feel like it's on fire"    Phenergan [promethazine] Other (See Comments)     SEIZURES    Nickel     Lortab [hydrocodone-acetaminophen] Itching     Able to take generic Norco        Objective         Fall Risk  Functional mobility test results suggest the patient is: At Risk for Falls  Timed Up & Go (TUG)  Time: 15.86 seconds     An older adult who takes >=12 seconds to complete the TUG is at risk for falling.       Sit to Stand Testing      The patient completed 9 repetitions of a sit to stand transfer in 30 seconds. with use of arms for push off         Ambulation Assistance Required  Surface With  Assistive Device Without Assistive Device Details   Level   Independent      Uneven         Curb           Gait Analysis  Base of Support: Wide                   Treatment:  Therapeutic Activity  TA 1: nu step level 1 for 15 mintues.    Time Entry(in minutes):  PT Evaluation (Low) Time Entry: 30  Therapeutic Activity Time Entry: 15    Assessment & Plan   Assessment  Alessia presents with a condition of Low complexity.   Presentation of Symptoms: Stable  Will Comorbidities Impact Care: No       Functional Limitations: Activity tolerance, Completing self-care activities, Proprioception, Range of motion, Participating in leisure activities, Pain with ADLs/IADLs, Gross motor coordination  Impairments: Pain with functional activity, Impaired physical strength  Personal Factors Affecting Prognosis: Pain    Prognosis: Excellent  Assessment Details: Pt presents to PT following chemo and radiation. Pt lives alone with her dog and is very weak with " decreased endurance at this time. Pt grossly has weak LE's and Ue's. Pt is in need of PT at this time to work on improving strength, balance, and endurance to be able to improve quality of life.     Plan  From a physical therapy perspective, the patient would benefit from: Skilled Rehab Services    Planned therapy interventions include: Therapeutic activities, Therapeutic exercise, Neuromuscular re-education, Manual therapy, ADLs/IADLs, and Gait training.    Planned modalities to include: Electrical stimulation - attended, Electrical stimulation - passive/unattended, Ultrasound, Thermotherapy (hot pack), and Low-level laser therapy.        Visit Frequency: 2 times Per Week for 6 Weeks.       This plan was discussed with Patient, Therapy assistant, and Caregiver.   Discussion participants: Agreed Upon Plan of Care             The patient's spiritual, cultural, and educational needs were considered, and the patient is agreeable to the plan of care and goals.           Goals:   Active       LTG       Pt will be compliant with HEP.         Start:  07/31/25    Expected End:  10/31/25            Pt will be able to ambulate with no AD with no rest breaks at least for 30 minutes.        Start:  07/31/25    Expected End:  10/31/25            Pt will improve Bilateral quads to be able to go up and down stairs.        Start:  07/31/25    Expected End:  10/31/25               STG       Pt will improve FOTO to be able to demonstrate improvement in functional task.        Start:  07/31/25    Expected End:  09/29/25            Pt will improve sit <> stand to 12 within 30 seconds.        Start:  07/31/25    Expected End:  09/29/25            Pt will improve standing dynamic balance to be able to complete daily tasks.        Start:  07/31/25    Expected End:  09/29/25                Jailene Myers, PT

## 2025-08-05 ENCOUNTER — CLINICAL SUPPORT (OUTPATIENT)
Dept: REHABILITATION | Facility: HOSPITAL | Age: 70
End: 2025-08-05
Payer: MEDICARE

## 2025-08-05 DIAGNOSIS — Z74.09 IMPAIRED FUNCTIONAL MOBILITY AND ACTIVITY TOLERANCE: Primary | ICD-10-CM

## 2025-08-05 PROCEDURE — 97530 THERAPEUTIC ACTIVITIES: CPT | Mod: PN

## 2025-08-05 PROCEDURE — 97112 NEUROMUSCULAR REEDUCATION: CPT | Mod: PN

## 2025-08-05 NOTE — PROGRESS NOTES
Outpatient Rehab    Physical Therapy Visit    Patient Name: Alessia Nelson  MRN: [id number]  YOB: 1955  Encounter Date: 8/5/2025    Therapy Diagnosis:   Encounter Diagnosis   Name Primary?    Impaired functional mobility and activity tolerance Yes     Physician: Bereket Jefferson MD    Physician Orders: Eval and Treat  Medical Diagnosis: Cancer of lower lobe of right lung  Surgical Diagnosis: Not applicable for this Episode   Surgical Date: Not applicable for this Episode  Days Since Last Surgery: Not applicable for this Episode    Visit # / Visits Authorized:  1 / 20  Insurance Authorization Period: 7/30/2025 to 12/31/2025  Date of Evaluation: 7/31/2025  Plan of Care Certification: 7/31/2025 to 10/31/2025      PT/PTA:     Number of PTA visits since last PT visit:   Time In: 1100   Time Out: 1155  Total Time (in minutes): 55   Total Billable Time (in minutes):      FOTO:  Intake Score (%): Not applicable for this Episode  Survey Score 2 (%): Not applicable for this Episode  Survey Score 3 (%): Not applicable for this Episode    Precautions:         Subjective   Pt has no new complaints at this time. Pt had a little soreness last time..  Pain reported as 5/10.      Objective            Treatment:  Balance/Neuromuscular Re-Education  NMR 1: Briges 2 x 10x  NMR 2: SLR 2x 10  NMR 3: LAQ 20x  NMR 4: Sit <> stands 10x from low mat.  Therapeutic Activity  TA 1: nu step level 1 for 15 mintues.  TA 2: 4 inch Steps ups 15x  TA 3: 4 inch Steps ups 15  TA 4: heel raises 20x  TA 5: heel stretch 3 x 30 SH  TA 6: hip 3 way 15x no resistance.    Time Entry(in minutes):  Neuromuscular Re-Education Time Entry: 30  Therapeutic Activity Time Entry: 25    Assessment & Plan   Assessment: Pt did well with first day of treatment at this time.   Evaluation/Treatment Tolerance: Patient tolerated treatment well    The patient will continue to benefit from skilled outpatient physical therapy in order to address the deficits  listed in the problem list on the initial evaluation, provide patient and family education, and maximize the patients level of independence in the home and community environments.     The patient's spiritual, cultural, and educational needs were considered, and the patient is agreeable to the plan of care and goals.           Plan:      Goals:   Active       LTG       Pt will be compliant with HEP.         Start:  07/31/25    Expected End:  10/31/25            Pt will be able to ambulate with no AD with no rest breaks at least for 30 minutes.        Start:  07/31/25    Expected End:  10/31/25            Pt will improve Bilateral quads to be able to go up and down stairs.        Start:  07/31/25    Expected End:  10/31/25               STG       Pt will improve FOTO to be able to demonstrate improvement in functional task.        Start:  07/31/25    Expected End:  09/29/25            Pt will improve sit <> stand to 12 within 30 seconds.        Start:  07/31/25    Expected End:  09/29/25            Pt will improve standing dynamic balance to be able to complete daily tasks.        Start:  07/31/25    Expected End:  09/29/25                Jailene Myers, PT

## 2025-08-07 ENCOUNTER — CLINICAL SUPPORT (OUTPATIENT)
Dept: REHABILITATION | Facility: HOSPITAL | Age: 70
End: 2025-08-07
Payer: MEDICARE

## 2025-08-07 DIAGNOSIS — M10.9 GOUT, UNSPECIFIED CAUSE, UNSPECIFIED CHRONICITY, UNSPECIFIED SITE: ICD-10-CM

## 2025-08-07 DIAGNOSIS — Z74.09 IMPAIRED FUNCTIONAL MOBILITY AND ACTIVITY TOLERANCE: Primary | ICD-10-CM

## 2025-08-07 PROCEDURE — 97530 THERAPEUTIC ACTIVITIES: CPT | Mod: PN,CQ

## 2025-08-07 NOTE — PROGRESS NOTES
"  Outpatient Rehab    Physical Therapy Visit    Patient Name: Alessia Nelson  MRN: [id number]  YOB: 1955  Encounter Date: 8/7/2025    Therapy Diagnosis:   Encounter Diagnosis   Name Primary?    Impaired functional mobility and activity tolerance Yes     Physician: Bereket Jefferson MD    Physician Orders: Eval and Treat  Medical Diagnosis: Cancer of lower lobe of right lung  Surgical Diagnosis: Not applicable for this Episode   Surgical Date: Not applicable for this Episode  Days Since Last Surgery: Not applicable for this Episode    Visit # / Visits Authorized:  2 / 20  Insurance Authorization Period: 7/30/2025 to 12/31/2025  Date of Evaluation: 7/31/2025  Plan of Care Certification: 7/31/2025 to 10/31/2025      PT/PTA: PTA   Number of PTA visits since last PT visit:1  Time In:   11:00 AM  Time Out:   11:45 AM  Total Time (in minutes):   45 Minutes  Total Billable Time (in minutes):   30 Minutes    FOTO:  Intake Score (%): Not applicable for this Episode  Survey Score 2 (%): Not applicable for this Episode  Survey Score 3 (%): Not applicable for this Episode    Precautions:         Subjective   Just tired of hurting.  Pain reported as 7/10. Thoracic Spine into Scapula    Objective            Treatment:  Balance/Neuromuscular Re-Education  NMR 1: Bridges 2 x 10  NMR 2: SLR 2 x 10  NMR 3: LAQ x 20  NMR 4: Sit <> stands 10x from low mat.  Therapeutic Activity  TA 1: Nustep level 1 for 15 mintues.  TA 2: FSU on 4" step x 15  TA 3: LSU on 4" step x 15  TA 4: Heel Raises x 20  TA 5: Heel Cord stretch on wedge 3 x 30 sec  TA 6: Standing Hip Flex/Abd/Ext x 15    Time Entry(in minutes):  Therapeutic Activity Time Entry: 30    Assessment & Plan   Assessment: Patient did well with exercises; no exacerbation of pain; rest breaks needed       The patient will continue to benefit from skilled outpatient physical therapy in order to address the deficits listed in the problem list on the initial evaluation, provide " patient and family education, and maximize the patients level of independence in the home and community environments.     The patient's spiritual, cultural, and educational needs were considered, and the patient is agreeable to the plan of care and goals.           Plan: Continue per POC and progress with strength and mobility exercises as patient tolerates    Goals:   Active       LTG       Pt will be compliant with HEP.   (Progressing)       Start:  07/31/25    Expected End:  10/31/25            Pt will be able to ambulate with no AD with no rest breaks at least for 30 minutes.  (Progressing)       Start:  07/31/25    Expected End:  10/31/25            Pt will improve Bilateral quads to be able to go up and down stairs.  (Progressing)       Start:  07/31/25    Expected End:  10/31/25               STG       Pt will improve FOTO to be able to demonstrate improvement in functional task.  (Progressing)       Start:  07/31/25    Expected End:  09/29/25            Pt will improve sit <> stand to 12 within 30 seconds.  (Progressing)       Start:  07/31/25    Expected End:  09/29/25            Pt will improve standing dynamic balance to be able to complete daily tasks.  (Progressing)       Start:  07/31/25    Expected End:  09/29/25                Jonathan Favre, PTA

## 2025-08-08 RX ORDER — ALLOPURINOL 300 MG/1
300 TABLET ORAL
Qty: 90 TABLET | Refills: 0 | Status: SHIPPED | OUTPATIENT
Start: 2025-08-08

## 2025-08-08 NOTE — TELEPHONE ENCOUNTER
Refill Routing Note   Medication(s) are not appropriate for processing by [facility] for the following reason(s):        Required labs outdated-uric acid    [facility] action(s):  Defer         Due for OV soon      Appointments  past 12m or future 3m with PCP    Date Provider   Last Visit   11/6/2024 Luana Calvert MD   Next Visit   Visit date not found Luana Calvert MD   ED visits in past 90 days: 1        Note composed:9:54 AM 08/08/2025

## 2025-08-18 ENCOUNTER — TELEPHONE (OUTPATIENT)
Dept: HEMATOLOGY/ONCOLOGY | Facility: CLINIC | Age: 70
End: 2025-08-18
Payer: MEDICARE

## 2025-08-18 DIAGNOSIS — R05.9 COUGH, UNSPECIFIED TYPE: Primary | ICD-10-CM

## 2025-08-19 ENCOUNTER — CLINICAL SUPPORT (OUTPATIENT)
Dept: REHABILITATION | Facility: HOSPITAL | Age: 70
End: 2025-08-19
Payer: MEDICARE

## 2025-08-19 DIAGNOSIS — Z74.09 IMPAIRED FUNCTIONAL MOBILITY AND ACTIVITY TOLERANCE: Primary | ICD-10-CM

## 2025-08-19 PROCEDURE — 97112 NEUROMUSCULAR REEDUCATION: CPT | Mod: PN

## 2025-08-19 PROCEDURE — 97530 THERAPEUTIC ACTIVITIES: CPT | Mod: PN

## 2025-08-20 ENCOUNTER — TELEPHONE (OUTPATIENT)
Dept: HEMATOLOGY/ONCOLOGY | Facility: CLINIC | Age: 70
End: 2025-08-20
Payer: MEDICARE

## 2025-08-20 ENCOUNTER — TELEPHONE (OUTPATIENT)
Dept: PSYCHIATRY | Facility: CLINIC | Age: 70
End: 2025-08-20
Payer: MEDICARE

## 2025-08-20 ENCOUNTER — TELEPHONE (OUTPATIENT)
Dept: INFUSION THERAPY | Facility: HOSPITAL | Age: 70
End: 2025-08-20
Payer: MEDICARE

## 2025-08-20 PROBLEM — Z79.899 IMMUNODEFICIENCY DUE TO DRUG THERAPY: Status: ACTIVE | Noted: 2025-06-02

## 2025-08-21 ENCOUNTER — CLINICAL SUPPORT (OUTPATIENT)
Dept: REHABILITATION | Facility: HOSPITAL | Age: 70
End: 2025-08-21
Payer: MEDICARE

## 2025-08-21 DIAGNOSIS — Z74.09 IMPAIRED FUNCTIONAL MOBILITY AND ACTIVITY TOLERANCE: Primary | ICD-10-CM

## 2025-08-21 PROCEDURE — 97112 NEUROMUSCULAR REEDUCATION: CPT | Mod: PN

## 2025-08-21 PROCEDURE — 97530 THERAPEUTIC ACTIVITIES: CPT | Mod: PN

## 2025-08-22 ENCOUNTER — TELEPHONE (OUTPATIENT)
Dept: CARDIOLOGY | Facility: CLINIC | Age: 70
End: 2025-08-22
Payer: MEDICARE

## 2025-08-22 ENCOUNTER — TELEPHONE (OUTPATIENT)
Dept: HEMATOLOGY/ONCOLOGY | Facility: CLINIC | Age: 70
End: 2025-08-22
Payer: MEDICARE

## 2025-08-25 ENCOUNTER — OFFICE VISIT (OUTPATIENT)
Dept: HEMATOLOGY/ONCOLOGY | Facility: CLINIC | Age: 70
End: 2025-08-25
Payer: MEDICARE

## 2025-08-25 ENCOUNTER — HOSPITAL ENCOUNTER (OUTPATIENT)
Dept: RADIOLOGY | Facility: HOSPITAL | Age: 70
Discharge: HOME | End: 2025-08-25
Attending: NURSE PRACTITIONER
Payer: MEDICARE

## 2025-08-25 VITALS
DIASTOLIC BLOOD PRESSURE: 70 MMHG | TEMPERATURE: 98 F | HEIGHT: 67 IN | RESPIRATION RATE: 20 BRPM | WEIGHT: 193.31 LBS | SYSTOLIC BLOOD PRESSURE: 124 MMHG | OXYGEN SATURATION: 96 % | BODY MASS INDEX: 30.34 KG/M2 | HEART RATE: 64 BPM

## 2025-08-25 DIAGNOSIS — D84.821 IMMUNODEFICIENCY DUE TO DRUG THERAPY: ICD-10-CM

## 2025-08-25 DIAGNOSIS — R10.9 ABDOMINAL PAIN, UNSPECIFIED ABDOMINAL LOCATION: ICD-10-CM

## 2025-08-25 DIAGNOSIS — C34.31 CANCER OF LOWER LOBE OF RIGHT LUNG: Primary | ICD-10-CM

## 2025-08-25 DIAGNOSIS — E03.4 HYPOTHYROIDISM DUE TO ACQUIRED ATROPHY OF THYROID: ICD-10-CM

## 2025-08-25 DIAGNOSIS — M96.1 FAILED BACK SYNDROME OF LUMBAR SPINE: ICD-10-CM

## 2025-08-25 DIAGNOSIS — K86.89 PANCREATIC INSUFFICIENCY: Chronic | ICD-10-CM

## 2025-08-25 DIAGNOSIS — M79.18 MYOFASCIAL PAIN SYNDROME OF LUMBAR SPINE: ICD-10-CM

## 2025-08-25 DIAGNOSIS — R05.9 COUGH, UNSPECIFIED TYPE: ICD-10-CM

## 2025-08-25 DIAGNOSIS — Z79.899 IMMUNODEFICIENCY DUE TO DRUG THERAPY: ICD-10-CM

## 2025-08-25 DIAGNOSIS — J06.9 UPPER RESPIRATORY TRACT INFECTION, UNSPECIFIED TYPE: ICD-10-CM

## 2025-08-25 DIAGNOSIS — E31.20 MEN (MULTIPLE ENDOCRINE NEOPLASIA): Chronic | ICD-10-CM

## 2025-08-25 PROBLEM — D53.9 MACROCYTIC ANEMIA: Status: ACTIVE | Noted: 2020-03-05

## 2025-08-25 PROCEDURE — 71046 X-RAY EXAM CHEST 2 VIEWS: CPT | Mod: TC,PO

## 2025-08-25 PROCEDURE — 71046 X-RAY EXAM CHEST 2 VIEWS: CPT | Mod: 26,,, | Performed by: RADIOLOGY

## 2025-08-25 PROCEDURE — 99999 PR PBB SHADOW E&M-EST. PATIENT-LVL V: CPT | Mod: PBBFAC,,, | Performed by: NURSE PRACTITIONER

## 2025-08-25 PROCEDURE — 99214 OFFICE O/P EST MOD 30 MIN: CPT | Mod: S$PBB,,, | Performed by: NURSE PRACTITIONER

## 2025-08-25 PROCEDURE — G2211 COMPLEX E/M VISIT ADD ON: HCPCS | Mod: ,,, | Performed by: NURSE PRACTITIONER

## 2025-08-25 PROCEDURE — 99215 OFFICE O/P EST HI 40 MIN: CPT | Mod: PBBFAC,PN,25 | Performed by: NURSE PRACTITIONER

## 2025-08-25 RX ORDER — DOXYCYCLINE 100 MG/1
100 CAPSULE ORAL EVERY 12 HOURS
Qty: 14 CAPSULE | Refills: 0 | Status: SHIPPED | OUTPATIENT
Start: 2025-08-25 | End: 2025-09-01

## 2025-08-26 ENCOUNTER — TELEPHONE (OUTPATIENT)
Dept: HEMATOLOGY/ONCOLOGY | Facility: CLINIC | Age: 70
End: 2025-08-26
Payer: MEDICARE

## 2025-08-26 ENCOUNTER — CLINICAL SUPPORT (OUTPATIENT)
Dept: REHABILITATION | Facility: HOSPITAL | Age: 70
End: 2025-08-26
Payer: MEDICARE

## 2025-08-26 DIAGNOSIS — Z74.09 IMPAIRED FUNCTIONAL MOBILITY AND ACTIVITY TOLERANCE: Primary | ICD-10-CM

## 2025-08-26 PROCEDURE — 97112 NEUROMUSCULAR REEDUCATION: CPT | Mod: PN

## 2025-08-26 PROCEDURE — 97530 THERAPEUTIC ACTIVITIES: CPT | Mod: PN

## 2025-08-26 RX ORDER — HEPARIN 100 UNIT/ML
500 SYRINGE INTRAVENOUS
Status: CANCELLED | OUTPATIENT
Start: 2025-08-27

## 2025-08-26 RX ORDER — DIPHENHYDRAMINE HYDROCHLORIDE 50 MG/ML
50 INJECTION, SOLUTION INTRAMUSCULAR; INTRAVENOUS ONCE AS NEEDED
Status: CANCELLED | OUTPATIENT
Start: 2025-08-27 | End: 2037-01-22

## 2025-08-26 RX ORDER — EPINEPHRINE 0.3 MG/.3ML
0.3 INJECTION SUBCUTANEOUS ONCE AS NEEDED
Status: CANCELLED | OUTPATIENT
Start: 2025-08-27 | End: 2037-01-22

## 2025-08-26 RX ORDER — ONDANSETRON HYDROCHLORIDE 2 MG/ML
8 INJECTION, SOLUTION INTRAVENOUS ONCE AS NEEDED
Status: CANCELLED | OUTPATIENT
Start: 2025-08-27 | End: 2037-01-22

## 2025-08-26 RX ORDER — SODIUM CHLORIDE 0.9 % (FLUSH) 0.9 %
10 SYRINGE (ML) INJECTION
Status: CANCELLED | OUTPATIENT
Start: 2025-08-27

## 2025-08-27 ENCOUNTER — INFUSION (OUTPATIENT)
Dept: INFUSION THERAPY | Facility: HOSPITAL | Age: 70
End: 2025-08-27
Attending: INTERNAL MEDICINE
Payer: MEDICARE

## 2025-08-27 ENCOUNTER — TELEPHONE (OUTPATIENT)
Dept: GASTROENTEROLOGY | Facility: CLINIC | Age: 70
End: 2025-08-27
Payer: MEDICARE

## 2025-08-27 VITALS
RESPIRATION RATE: 18 BRPM | TEMPERATURE: 98 F | HEART RATE: 68 BPM | OXYGEN SATURATION: 99 % | SYSTOLIC BLOOD PRESSURE: 145 MMHG | DIASTOLIC BLOOD PRESSURE: 74 MMHG | WEIGHT: 194 LBS | HEIGHT: 67 IN | BODY MASS INDEX: 30.45 KG/M2

## 2025-08-27 DIAGNOSIS — C34.31 CANCER OF LOWER LOBE OF RIGHT LUNG: Primary | ICD-10-CM

## 2025-08-27 PROCEDURE — 96413 CHEMO IV INFUSION 1 HR: CPT | Mod: PN

## 2025-08-27 PROCEDURE — 25000003 PHARM REV CODE 250: Mod: PN | Performed by: NURSE PRACTITIONER

## 2025-08-27 PROCEDURE — 63600175 PHARM REV CODE 636 W HCPCS: Mod: JZ,TB,PN | Performed by: NURSE PRACTITIONER

## 2025-08-27 RX ORDER — DIPHENHYDRAMINE HYDROCHLORIDE 50 MG/ML
50 INJECTION, SOLUTION INTRAMUSCULAR; INTRAVENOUS ONCE AS NEEDED
Status: DISCONTINUED | OUTPATIENT
Start: 2025-08-27 | End: 2025-08-27 | Stop reason: HOSPADM

## 2025-08-27 RX ORDER — HEPARIN 100 UNIT/ML
500 SYRINGE INTRAVENOUS
Status: DISCONTINUED | OUTPATIENT
Start: 2025-08-27 | End: 2025-08-27 | Stop reason: HOSPADM

## 2025-08-27 RX ORDER — SODIUM CHLORIDE 0.9 % (FLUSH) 0.9 %
10 SYRINGE (ML) INJECTION
Status: DISCONTINUED | OUTPATIENT
Start: 2025-08-27 | End: 2025-08-27 | Stop reason: HOSPADM

## 2025-08-27 RX ORDER — ONDANSETRON HYDROCHLORIDE 2 MG/ML
8 INJECTION, SOLUTION INTRAVENOUS ONCE AS NEEDED
Status: DISCONTINUED | OUTPATIENT
Start: 2025-08-27 | End: 2025-08-27 | Stop reason: HOSPADM

## 2025-08-27 RX ORDER — EPINEPHRINE 0.3 MG/.3ML
0.3 INJECTION SUBCUTANEOUS ONCE AS NEEDED
Status: DISCONTINUED | OUTPATIENT
Start: 2025-08-27 | End: 2025-08-27 | Stop reason: HOSPADM

## 2025-08-27 RX ADMIN — SODIUM CHLORIDE: 9 INJECTION, SOLUTION INTRAVENOUS at 12:08

## 2025-08-27 RX ADMIN — SODIUM CHLORIDE 1500 MG: 9 INJECTION, SOLUTION INTRAVENOUS at 12:08

## 2025-09-01 PROBLEM — D84.821 IMMUNODEFICIENCY DUE TO DRUG THERAPY: Status: RESOLVED | Noted: 2025-06-02 | Resolved: 2025-09-01

## 2025-09-01 PROBLEM — Z79.899 IMMUNODEFICIENCY DUE TO DRUG THERAPY: Status: RESOLVED | Noted: 2025-06-02 | Resolved: 2025-09-01

## 2025-09-02 ENCOUNTER — CLINICAL SUPPORT (OUTPATIENT)
Dept: REHABILITATION | Facility: HOSPITAL | Age: 70
End: 2025-09-02
Payer: MEDICARE

## 2025-09-02 DIAGNOSIS — Z74.09 IMPAIRED FUNCTIONAL MOBILITY AND ACTIVITY TOLERANCE: Primary | ICD-10-CM

## 2025-09-02 PROCEDURE — 97112 NEUROMUSCULAR REEDUCATION: CPT | Mod: PN,CQ

## 2025-09-02 PROCEDURE — 97530 THERAPEUTIC ACTIVITIES: CPT | Mod: PN,CQ

## 2025-09-03 DIAGNOSIS — M43.07 LUMBOSACRAL SPONDYLOLYSIS: ICD-10-CM

## 2025-09-03 DIAGNOSIS — M96.1 POSTLAMINECTOMY SYNDROME OF LUMBAR REGION: ICD-10-CM

## 2025-09-03 DIAGNOSIS — G89.4 CHRONIC PAIN DISORDER: ICD-10-CM

## 2025-09-03 DIAGNOSIS — M51.369 DDD (DEGENERATIVE DISC DISEASE), LUMBAR: ICD-10-CM

## 2025-09-04 ENCOUNTER — CLINICAL SUPPORT (OUTPATIENT)
Dept: REHABILITATION | Facility: HOSPITAL | Age: 70
End: 2025-09-04
Payer: MEDICARE

## 2025-09-04 DIAGNOSIS — Z74.09 IMPAIRED FUNCTIONAL MOBILITY AND ACTIVITY TOLERANCE: Primary | ICD-10-CM

## 2025-09-04 PROCEDURE — 97112 NEUROMUSCULAR REEDUCATION: CPT | Mod: PN

## 2025-09-04 PROCEDURE — 97530 THERAPEUTIC ACTIVITIES: CPT | Mod: PN

## 2025-09-04 RX ORDER — GABAPENTIN 400 MG/1
400 CAPSULE ORAL 2 TIMES DAILY
Qty: 180 CAPSULE | Refills: 0 | Status: SHIPPED | OUTPATIENT
Start: 2025-09-04

## (undated) DEVICE — DRESSING ABSRBNT ISLAND 3.6X8

## (undated) DEVICE — INTERPULSE SET

## (undated) DEVICE — DRAPE INCISE IOBAN 2 23X17IN

## (undated) DEVICE — GLOVE SURG ULTRA TOUCH 7.5

## (undated) DEVICE — DRAPE C ARM 42 X 120 10/BX

## (undated) DEVICE — KIT NERVE BLOCK PREP BAPTIST

## (undated) DEVICE — PAD GROUNDING DISPER ELECTRODE

## (undated) DEVICE — NDL SAFETY 25G X 1.5 ECLIPSE

## (undated) DEVICE — SLEEVE SCD EXPRESS CALF MEDIUM

## (undated) DEVICE — SYR GLASS 5CC LUER LOK

## (undated) DEVICE — TIP YANKAUERS BULB NO VENT

## (undated) DEVICE — SCRUB HIBICLENS 4% CHG 4OZ

## (undated) DEVICE — ELECTRODE REM PLYHSV RETURN 9

## (undated) DEVICE — SUT STRATAFIX SPIRAL VIOLET

## (undated) DEVICE — SUT 2/0 30IN SILK BLK BRAI

## (undated) DEVICE — ADAPTER STOPCOCK FEMALE LL

## (undated) DEVICE — NDL 10CC 20GAX1 COMBO

## (undated) DEVICE — APPLICATOR CHLORAPREP ORN 26ML

## (undated) DEVICE — NDL TUOHY EPIDURAL 20G X 3.5

## (undated) DEVICE — GLOVE SURG ULTRA TOUCH 8

## (undated) DEVICE — STRAP OR TABLE 5IN X 72IN

## (undated) DEVICE — SEE MEDLINE ITEM 157116

## (undated) DEVICE — NDL ECLIPSE SAF REG 25GX1.5IN

## (undated) DEVICE — TUBE SUCTION YANKAUER HI CAP

## (undated) DEVICE — SEE MEDLINE ITEM 157131

## (undated) DEVICE — DRAPE MINOR PROCEDURE

## (undated) DEVICE — SYR 3CC LUER LOC

## (undated) DEVICE — SYS LABEL CORRECT MED

## (undated) DEVICE — WRAP PROTECTIVE LEG POS STRL

## (undated) DEVICE — SEE MEDLINE ITEM 152622

## (undated) DEVICE — DRESSING TELFA PAD N ADH 2X3

## (undated) DEVICE — SYR SLIP TIP 10ML SHIELD

## (undated) DEVICE — GLOVE GAMMEX 7 PF STERILE

## (undated) DEVICE — SUCTION FRAZIER TIP SURG 12FR

## (undated) DEVICE — NDL SPINAL 22GA 3.5 IN QUINCKE

## (undated) DEVICE — SEE MEDLINE ITEM 157185

## (undated) DEVICE — SEE MEDLINE ITEM 153151

## (undated) DEVICE — SUT MONOCRYL 3-0 PS-1

## (undated) DEVICE — BANDAID GARFIELD

## (undated) DEVICE — SOL IRRI STRL WATER 1000ML

## (undated) DEVICE — SEE MEDLINE ITEM 107746

## (undated) DEVICE — CHLORAPREP 10.5 ML APPLICATOR

## (undated) DEVICE — SYR DISP LL 5CC

## (undated) DEVICE — SEE L#120831

## (undated) DEVICE — BLADE SURG CARBON STEEL #10

## (undated) DEVICE — KIT TUNNELING TOOL 35CM

## (undated) DEVICE — GAUZE SPONGE 4X4 12PLY

## (undated) DEVICE — PACK BASIC

## (undated) DEVICE — NDL HYPODERMIC BLUNT 18G 1.5IN

## (undated) DEVICE — SUT SILK 2.0 BLK 18

## (undated) DEVICE — GLOVE PI ULTRA TOUCH G SURGEON

## (undated) DEVICE — PADDING CAST SPECIALIST 6X4YD

## (undated) DEVICE — SUT CTD VICRYL 3-0 CR/SH

## (undated) DEVICE — SEE MEDLINE ITEM 152530

## (undated) DEVICE — PACK LOWER EXTREMITY

## (undated) DEVICE — DRAPE STERI INSTRUMENT 1018

## (undated) DEVICE — Device

## (undated) DEVICE — BLADE SAG DUAL 18MMX1.27MMX90M

## (undated) DEVICE — BANDAGE ESMARK 6X12

## (undated) DEVICE — COVER EQUIP 36X12 W/ELSTC BAND

## (undated) DEVICE — CANNULA ETCO2 ADULT 7 O2/CO2

## (undated) DEVICE — NDL SPINAL 22GX5

## (undated) DEVICE — SUT ETHIBOND XTRA 5 V-37 GR

## (undated) DEVICE — STAPLER SKIN ROTATING HEAD

## (undated) DEVICE — NDL SPINAL SPINOCAN 22GX3.5

## (undated) DEVICE — KIT PROBE COOLIEF RF 17G 75MM

## (undated) DEVICE — SET CYSTO IRRIGATION UNIV SPIK

## (undated) DEVICE — SYS CLSR DERMABOND PRINEO 22CM

## (undated) DEVICE — GOWN TOGA SYS PEELWY ZIP 2 XL

## (undated) DEVICE — BANDAGE ADHESIVE

## (undated) DEVICE — SEE MEDLINE ITEM 157166

## (undated) DEVICE — TOGA FLYTE PEEL AWAY XLARGE

## (undated) DEVICE — PATIENT REMOTE KIT

## (undated) DEVICE — DRESSING TRANS 6X8 TEGADERM

## (undated) DEVICE — DRAPE ABDOMINAL TIBURON 14X11

## (undated) DEVICE — GLOVE SENSICARE PI ALOE 7.5

## (undated) DEVICE — SYR 10CC LUER LOCK

## (undated) DEVICE — VALVE OLYMPUS SCOPE SUCTION

## (undated) DEVICE — CANISTER SUCTION 3000CC

## (undated) DEVICE — DRAPE ORTHOMAX BAR

## (undated) DEVICE — TOWEL OR DISP STRL BLUE 4/PK

## (undated) DEVICE — GLOVE SENSICARE PI ALOE 7

## (undated) DEVICE — TUBING MINIBORE EXTENSION

## (undated) DEVICE — PACK CUSTOM UNIV BASIN SLI

## (undated) DEVICE — SUT STRATAFIX PDS PLS 45CM

## (undated) DEVICE — SPONGE SUPER KERLIX 6X6.75IN

## (undated) DEVICE — TOURNIQUET SB QC DP 34X4IN

## (undated) DEVICE — MIXER BONE CEMENT

## (undated) DEVICE — SOL 9P NACL IRR PIC IL

## (undated) DEVICE — ALCOHOL 70% ISOP RUBBING 4OZ

## (undated) DEVICE — SOL IRR NACL .9% 3000ML

## (undated) DEVICE — GLOVE SENSICARE PI ALOE 8

## (undated) DEVICE — UNDERGLOVES BIOGEL PI SIZE 8

## (undated) DEVICE — MANIFOLD 4 PORT

## (undated) DEVICE — APPLICATOR CHLORAPREP CLR 10.5

## (undated) DEVICE — CHARGER KIT

## (undated) DEVICE — GLOVE SURG ULTRA TOUCH 8.5

## (undated) DEVICE — SOL WATER STRL IRR 1000ML

## (undated) DEVICE — CUBE COLD THERAPY POLAR CARE

## (undated) DEVICE — PACK NASAL SINUS

## (undated) DEVICE — GOWN POLY REINF SET SLV XL

## (undated) DEVICE — GLOVE SENSICARE PI SURG 7

## (undated) DEVICE — NDL FILTER 19GA X 1-1/2

## (undated) DEVICE — ADAPTER SWIVEL

## (undated) DEVICE — DRESSING AQUACEL AG 3.5X10IN

## (undated) DEVICE — GLOVE SENSICARE PI GRN 7

## (undated) DEVICE — DRAPE STERI U-SHAPED 47X51IN

## (undated) DEVICE — LINER SUCTION 3000CC